# Patient Record
Sex: MALE | Race: WHITE | NOT HISPANIC OR LATINO | Employment: FULL TIME | URBAN - METROPOLITAN AREA
[De-identification: names, ages, dates, MRNs, and addresses within clinical notes are randomized per-mention and may not be internally consistent; named-entity substitution may affect disease eponyms.]

---

## 2021-02-02 ENCOUNTER — OFFICE VISIT (OUTPATIENT)
Dept: FAMILY MEDICINE | Facility: CLINIC | Age: 46
End: 2021-02-02
Payer: COMMERCIAL

## 2021-02-02 VITALS
SYSTOLIC BLOOD PRESSURE: 138 MMHG | TEMPERATURE: 98 F | BODY MASS INDEX: 52.48 KG/M2 | OXYGEN SATURATION: 96 % | HEIGHT: 65 IN | WEIGHT: 315 LBS | DIASTOLIC BLOOD PRESSURE: 77 MMHG | HEART RATE: 94 BPM

## 2021-02-02 DIAGNOSIS — Z11.4 ENCOUNTER FOR SCREENING FOR HIV: ICD-10-CM

## 2021-02-02 DIAGNOSIS — Z98.890 H/O CARDIAC RADIOFREQUENCY ABLATION: ICD-10-CM

## 2021-02-02 DIAGNOSIS — Z86.79 HISTORY OF SUPRAVENTRICULAR TACHYCARDIA: ICD-10-CM

## 2021-02-02 DIAGNOSIS — E66.01 MORBID OBESITY: ICD-10-CM

## 2021-02-02 DIAGNOSIS — K52.9 GASTROENTERITIS: ICD-10-CM

## 2021-02-02 DIAGNOSIS — R73.03 PREDIABETES: ICD-10-CM

## 2021-02-02 DIAGNOSIS — Z11.59 ENCOUNTER FOR HEPATITIS C SCREENING TEST FOR LOW RISK PATIENT: ICD-10-CM

## 2021-02-02 DIAGNOSIS — Z12.5 SCREENING PSA (PROSTATE SPECIFIC ANTIGEN): ICD-10-CM

## 2021-02-02 DIAGNOSIS — Z72.0 TOBACCO USE: ICD-10-CM

## 2021-02-02 DIAGNOSIS — Z00.00 PHYSICAL EXAM: ICD-10-CM

## 2021-02-02 DIAGNOSIS — G47.00 INSOMNIA, UNSPECIFIED TYPE: Primary | ICD-10-CM

## 2021-02-02 PROCEDURE — 99386 PR PREVENTIVE VISIT,NEW,40-64: ICD-10-PCS | Mod: S$GLB,,, | Performed by: FAMILY MEDICINE

## 2021-02-02 PROCEDURE — 99386 PREV VISIT NEW AGE 40-64: CPT | Mod: S$GLB,,, | Performed by: FAMILY MEDICINE

## 2021-02-02 PROCEDURE — 3008F BODY MASS INDEX DOCD: CPT | Mod: CPTII,S$GLB,, | Performed by: FAMILY MEDICINE

## 2021-02-02 PROCEDURE — 1126F AMNT PAIN NOTED NONE PRSNT: CPT | Mod: S$GLB,,, | Performed by: FAMILY MEDICINE

## 2021-02-02 PROCEDURE — 1126F PR PAIN SEVERITY QUANTIFIED, NO PAIN PRESENT: ICD-10-PCS | Mod: S$GLB,,, | Performed by: FAMILY MEDICINE

## 2021-02-02 PROCEDURE — 3008F PR BODY MASS INDEX (BMI) DOCUMENTED: ICD-10-PCS | Mod: CPTII,S$GLB,, | Performed by: FAMILY MEDICINE

## 2021-02-02 PROCEDURE — 99213 PR OFFICE/OUTPT VISIT, EST, LEVL III, 20-29 MIN: ICD-10-PCS | Mod: S$GLB,,, | Performed by: FAMILY MEDICINE

## 2021-02-02 PROCEDURE — 99213 OFFICE O/P EST LOW 20 MIN: CPT | Mod: S$GLB,,, | Performed by: FAMILY MEDICINE

## 2021-02-02 RX ORDER — METOPROLOL SUCCINATE 100 MG/1
50 TABLET, EXTENDED RELEASE ORAL
COMMUNITY
Start: 2020-09-02 | End: 2021-08-11

## 2021-02-02 RX ORDER — ONDANSETRON 4 MG/1
4 TABLET, FILM COATED ORAL EVERY 6 HOURS PRN
Qty: 10 TABLET | Refills: 0 | Status: SHIPPED | OUTPATIENT
Start: 2021-02-02 | End: 2021-08-11

## 2021-02-02 RX ORDER — ZOLPIDEM TARTRATE 10 MG/1
TABLET ORAL
COMMUNITY
Start: 2021-01-27 | End: 2021-02-02 | Stop reason: SDUPTHER

## 2021-02-02 RX ORDER — ZOLPIDEM TARTRATE 5 MG/1
5 TABLET ORAL NIGHTLY PRN
Qty: 30 TABLET | Refills: 5 | Status: SHIPPED | OUTPATIENT
Start: 2021-02-02 | End: 2021-08-11

## 2021-02-02 RX ORDER — SIMVASTATIN 20 MG/1
20 TABLET, FILM COATED ORAL NIGHTLY
COMMUNITY
Start: 2021-01-27 | End: 2021-08-11 | Stop reason: SDUPTHER

## 2021-02-03 PROBLEM — G47.00 INSOMNIA: Status: ACTIVE | Noted: 2021-02-03

## 2021-02-03 PROBLEM — Z72.0 TOBACCO USE: Status: ACTIVE | Noted: 2021-02-03

## 2021-02-03 PROBLEM — E66.01 MORBID OBESITY: Status: ACTIVE | Noted: 2021-02-03

## 2021-02-03 PROBLEM — Z98.890 H/O CARDIAC RADIOFREQUENCY ABLATION: Status: ACTIVE | Noted: 2021-02-03

## 2021-02-03 PROBLEM — R73.03 PREDIABETES: Status: ACTIVE | Noted: 2021-02-03

## 2021-02-03 PROBLEM — Z86.79 HISTORY OF SUPRAVENTRICULAR TACHYCARDIA: Status: ACTIVE | Noted: 2021-02-03

## 2021-03-10 ENCOUNTER — LAB VISIT (OUTPATIENT)
Dept: LAB | Facility: HOSPITAL | Age: 46
End: 2021-03-10
Attending: FAMILY MEDICINE
Payer: COMMERCIAL

## 2021-03-10 DIAGNOSIS — G47.00 INSOMNIA, UNSPECIFIED TYPE: ICD-10-CM

## 2021-03-10 DIAGNOSIS — Z11.4 ENCOUNTER FOR SCREENING FOR HIV: ICD-10-CM

## 2021-03-10 DIAGNOSIS — Z72.0 TOBACCO USE: ICD-10-CM

## 2021-03-10 DIAGNOSIS — Z12.5 SCREENING PSA (PROSTATE SPECIFIC ANTIGEN): ICD-10-CM

## 2021-03-10 DIAGNOSIS — Z00.00 PHYSICAL EXAM: ICD-10-CM

## 2021-03-10 DIAGNOSIS — Z11.59 ENCOUNTER FOR HEPATITIS C SCREENING TEST FOR LOW RISK PATIENT: ICD-10-CM

## 2021-03-10 LAB
ALBUMIN SERPL BCP-MCNC: 3.7 G/DL (ref 3.5–5.2)
ALP SERPL-CCNC: 71 U/L (ref 55–135)
ALT SERPL W/O P-5'-P-CCNC: 23 U/L (ref 10–44)
ANION GAP SERPL CALC-SCNC: 10 MMOL/L (ref 8–16)
AST SERPL-CCNC: 13 U/L (ref 10–40)
BASOPHILS # BLD AUTO: 0.07 K/UL (ref 0–0.2)
BASOPHILS NFR BLD: 0.6 % (ref 0–1.9)
BILIRUB SERPL-MCNC: 0.5 MG/DL (ref 0.1–1)
BUN SERPL-MCNC: 19 MG/DL (ref 6–20)
CALCIUM SERPL-MCNC: 9.3 MG/DL (ref 8.7–10.5)
CHLORIDE SERPL-SCNC: 101 MMOL/L (ref 95–110)
CHOLEST SERPL-MCNC: 130 MG/DL (ref 120–199)
CHOLEST/HDLC SERPL: 3.8 {RATIO} (ref 2–5)
CO2 SERPL-SCNC: 28 MMOL/L (ref 23–29)
COMPLEXED PSA SERPL-MCNC: 0.24 NG/ML (ref 0–4)
CREAT SERPL-MCNC: 0.8 MG/DL (ref 0.5–1.4)
DIFFERENTIAL METHOD: ABNORMAL
EOSINOPHIL # BLD AUTO: 0.5 K/UL (ref 0–0.5)
EOSINOPHIL NFR BLD: 4 % (ref 0–8)
ERYTHROCYTE [DISTWIDTH] IN BLOOD BY AUTOMATED COUNT: 14.1 % (ref 11.5–14.5)
EST. GFR  (AFRICAN AMERICAN): >60 ML/MIN/1.73 M^2
EST. GFR  (NON AFRICAN AMERICAN): >60 ML/MIN/1.73 M^2
ESTIMATED AVG GLUCOSE: 126 MG/DL (ref 68–131)
GLUCOSE SERPL-MCNC: 144 MG/DL (ref 70–110)
HBA1C MFR BLD: 6 % (ref 4.5–6.2)
HCT VFR BLD AUTO: 43.5 % (ref 40–54)
HDLC SERPL-MCNC: 34 MG/DL (ref 40–75)
HDLC SERPL: 26.2 % (ref 20–50)
HGB BLD-MCNC: 14.1 G/DL (ref 14–18)
IMM GRANULOCYTES # BLD AUTO: 0.04 K/UL (ref 0–0.04)
IMM GRANULOCYTES NFR BLD AUTO: 0.3 % (ref 0–0.5)
LDLC SERPL CALC-MCNC: 76.6 MG/DL (ref 63–159)
LYMPHOCYTES # BLD AUTO: 3.7 K/UL (ref 1–4.8)
LYMPHOCYTES NFR BLD: 30.4 % (ref 18–48)
MCH RBC QN AUTO: 32.6 PG (ref 27–31)
MCHC RBC AUTO-ENTMCNC: 32.4 G/DL (ref 32–36)
MCV RBC AUTO: 101 FL (ref 82–98)
MONOCYTES # BLD AUTO: 0.6 K/UL (ref 0.3–1)
MONOCYTES NFR BLD: 5 % (ref 4–15)
NEUTROPHILS # BLD AUTO: 7.2 K/UL (ref 1.8–7.7)
NEUTROPHILS NFR BLD: 59.7 % (ref 38–73)
NONHDLC SERPL-MCNC: 96 MG/DL
NRBC BLD-RTO: 0 /100 WBC
PLATELET # BLD AUTO: 274 K/UL (ref 150–350)
PMV BLD AUTO: 10.9 FL (ref 9.2–12.9)
POTASSIUM SERPL-SCNC: 4.8 MMOL/L (ref 3.5–5.1)
PROT SERPL-MCNC: 8 G/DL (ref 6–8.4)
RBC # BLD AUTO: 4.32 M/UL (ref 4.6–6.2)
SODIUM SERPL-SCNC: 139 MMOL/L (ref 136–145)
TRIGL SERPL-MCNC: 97 MG/DL (ref 30–150)
WBC # BLD AUTO: 12.1 K/UL (ref 3.9–12.7)

## 2021-03-10 PROCEDURE — 83036 HEMOGLOBIN GLYCOSYLATED A1C: CPT | Performed by: FAMILY MEDICINE

## 2021-03-10 PROCEDURE — 87389 HIV-1 AG W/HIV-1&-2 AB AG IA: CPT | Performed by: FAMILY MEDICINE

## 2021-03-10 PROCEDURE — 85025 COMPLETE CBC W/AUTO DIFF WBC: CPT | Performed by: FAMILY MEDICINE

## 2021-03-10 PROCEDURE — 36415 COLL VENOUS BLD VENIPUNCTURE: CPT | Performed by: FAMILY MEDICINE

## 2021-03-10 PROCEDURE — 86803 HEPATITIS C AB TEST: CPT | Performed by: FAMILY MEDICINE

## 2021-03-10 PROCEDURE — 80053 COMPREHEN METABOLIC PANEL: CPT | Performed by: FAMILY MEDICINE

## 2021-03-10 PROCEDURE — 84153 ASSAY OF PSA TOTAL: CPT | Performed by: FAMILY MEDICINE

## 2021-03-10 PROCEDURE — 80061 LIPID PANEL: CPT | Performed by: FAMILY MEDICINE

## 2021-03-11 LAB
HCV AB S/CO SERPL IA: <0.1 S/CO RATIO (ref 0–0.9)
HIV 1+2 AB+HIV1 P24 AG SERPL QL IA: NON REACTIVE

## 2021-08-11 ENCOUNTER — OFFICE VISIT (OUTPATIENT)
Dept: FAMILY MEDICINE | Facility: CLINIC | Age: 46
End: 2021-08-11
Payer: COMMERCIAL

## 2021-08-11 VITALS
BODY MASS INDEX: 52.48 KG/M2 | WEIGHT: 315 LBS | HEART RATE: 91 BPM | TEMPERATURE: 98 F | SYSTOLIC BLOOD PRESSURE: 128 MMHG | OXYGEN SATURATION: 95 % | HEIGHT: 65 IN | DIASTOLIC BLOOD PRESSURE: 68 MMHG

## 2021-08-11 DIAGNOSIS — G47.00 INSOMNIA, UNSPECIFIED TYPE: ICD-10-CM

## 2021-08-11 DIAGNOSIS — Z13.220 SCREENING CHOLESTEROL LEVEL: ICD-10-CM

## 2021-08-11 DIAGNOSIS — Z86.79 HISTORY OF SUPRAVENTRICULAR TACHYCARDIA: ICD-10-CM

## 2021-08-11 DIAGNOSIS — E66.01 MORBID OBESITY: ICD-10-CM

## 2021-08-11 DIAGNOSIS — R73.03 PREDIABETES: Primary | ICD-10-CM

## 2021-08-11 DIAGNOSIS — Z72.0 TOBACCO USE: ICD-10-CM

## 2021-08-11 PROCEDURE — 99214 PR OFFICE/OUTPT VISIT, EST, LEVL IV, 30-39 MIN: ICD-10-PCS | Mod: S$GLB,,, | Performed by: FAMILY MEDICINE

## 2021-08-11 PROCEDURE — 99214 OFFICE O/P EST MOD 30 MIN: CPT | Mod: S$GLB,,, | Performed by: FAMILY MEDICINE

## 2021-08-11 PROCEDURE — 3044F HG A1C LEVEL LT 7.0%: CPT | Mod: CPTII,S$GLB,, | Performed by: FAMILY MEDICINE

## 2021-08-11 PROCEDURE — 1126F AMNT PAIN NOTED NONE PRSNT: CPT | Mod: CPTII,S$GLB,, | Performed by: FAMILY MEDICINE

## 2021-08-11 PROCEDURE — 3078F PR MOST RECENT DIASTOLIC BLOOD PRESSURE < 80 MM HG: ICD-10-PCS | Mod: CPTII,S$GLB,, | Performed by: FAMILY MEDICINE

## 2021-08-11 PROCEDURE — 1159F MED LIST DOCD IN RCRD: CPT | Mod: CPTII,S$GLB,, | Performed by: FAMILY MEDICINE

## 2021-08-11 PROCEDURE — 3008F PR BODY MASS INDEX (BMI) DOCUMENTED: ICD-10-PCS | Mod: CPTII,S$GLB,, | Performed by: FAMILY MEDICINE

## 2021-08-11 PROCEDURE — 3044F PR MOST RECENT HEMOGLOBIN A1C LEVEL <7.0%: ICD-10-PCS | Mod: CPTII,S$GLB,, | Performed by: FAMILY MEDICINE

## 2021-08-11 PROCEDURE — 3008F BODY MASS INDEX DOCD: CPT | Mod: CPTII,S$GLB,, | Performed by: FAMILY MEDICINE

## 2021-08-11 PROCEDURE — 1126F PR PAIN SEVERITY QUANTIFIED, NO PAIN PRESENT: ICD-10-PCS | Mod: CPTII,S$GLB,, | Performed by: FAMILY MEDICINE

## 2021-08-11 PROCEDURE — 3074F SYST BP LT 130 MM HG: CPT | Mod: CPTII,S$GLB,, | Performed by: FAMILY MEDICINE

## 2021-08-11 PROCEDURE — 3078F DIAST BP <80 MM HG: CPT | Mod: CPTII,S$GLB,, | Performed by: FAMILY MEDICINE

## 2021-08-11 PROCEDURE — 1159F PR MEDICATION LIST DOCUMENTED IN MEDICAL RECORD: ICD-10-PCS | Mod: CPTII,S$GLB,, | Performed by: FAMILY MEDICINE

## 2021-08-11 PROCEDURE — 3074F PR MOST RECENT SYSTOLIC BLOOD PRESSURE < 130 MM HG: ICD-10-PCS | Mod: CPTII,S$GLB,, | Performed by: FAMILY MEDICINE

## 2021-08-11 RX ORDER — METOPROLOL SUCCINATE 50 MG/1
50 TABLET, EXTENDED RELEASE ORAL DAILY
Qty: 90 TABLET | Refills: 1 | Status: SHIPPED | OUTPATIENT
Start: 2021-08-11 | End: 2022-01-26 | Stop reason: SDUPTHER

## 2021-08-11 RX ORDER — ALBUTEROL SULFATE 90 UG/1
AEROSOL, METERED RESPIRATORY (INHALATION)
COMMUNITY
Start: 2021-07-06 | End: 2022-03-23

## 2021-08-11 RX ORDER — ZOLPIDEM TARTRATE 10 MG/1
10 TABLET ORAL NIGHTLY
Qty: 30 TABLET | Refills: 5 | Status: SHIPPED | OUTPATIENT
Start: 2021-08-11 | End: 2022-01-26 | Stop reason: SDUPTHER

## 2021-08-11 RX ORDER — ZOLPIDEM TARTRATE 10 MG/1
10 TABLET ORAL
COMMUNITY
End: 2021-08-11 | Stop reason: SDUPTHER

## 2021-08-11 RX ORDER — SIMVASTATIN 20 MG/1
20 TABLET, FILM COATED ORAL NIGHTLY
Qty: 90 TABLET | Refills: 1 | Status: SHIPPED | OUTPATIENT
Start: 2021-08-11 | End: 2022-01-26 | Stop reason: SDUPTHER

## 2021-08-11 RX ORDER — METOPROLOL SUCCINATE 50 MG/1
50 TABLET, EXTENDED RELEASE ORAL
COMMUNITY
End: 2021-08-11 | Stop reason: SDUPTHER

## 2021-11-10 ENCOUNTER — LAB VISIT (OUTPATIENT)
Dept: LAB | Facility: HOSPITAL | Age: 46
End: 2021-11-10
Attending: FAMILY MEDICINE
Payer: COMMERCIAL

## 2021-11-10 DIAGNOSIS — R73.03 PREDIABETES: ICD-10-CM

## 2021-11-10 DIAGNOSIS — E66.01 MORBID OBESITY: ICD-10-CM

## 2021-11-10 DIAGNOSIS — Z13.220 SCREENING CHOLESTEROL LEVEL: ICD-10-CM

## 2021-11-10 LAB
ALBUMIN SERPL BCP-MCNC: 4.1 G/DL (ref 3.5–5.2)
ALP SERPL-CCNC: 66 U/L (ref 55–135)
ALT SERPL W/O P-5'-P-CCNC: 18 U/L (ref 10–44)
ANION GAP SERPL CALC-SCNC: 7 MMOL/L (ref 8–16)
AST SERPL-CCNC: 18 U/L (ref 10–40)
BILIRUB SERPL-MCNC: 0.6 MG/DL (ref 0.1–1)
BUN SERPL-MCNC: 20 MG/DL (ref 6–20)
CALCIUM SERPL-MCNC: 9.6 MG/DL (ref 8.7–10.5)
CHLORIDE SERPL-SCNC: 102 MMOL/L (ref 95–110)
CHOLEST SERPL-MCNC: 144 MG/DL (ref 120–199)
CHOLEST/HDLC SERPL: 4.2 {RATIO} (ref 2–5)
CO2 SERPL-SCNC: 29 MMOL/L (ref 23–29)
CREAT SERPL-MCNC: 1 MG/DL (ref 0.5–1.4)
EST. GFR  (AFRICAN AMERICAN): >60 ML/MIN/1.73 M^2
EST. GFR  (NON AFRICAN AMERICAN): >60 ML/MIN/1.73 M^2
ESTIMATED AVG GLUCOSE: 140 MG/DL (ref 68–131)
GLUCOSE SERPL-MCNC: 155 MG/DL (ref 70–110)
HBA1C MFR BLD: 6.5 % (ref 4.5–6.2)
HDLC SERPL-MCNC: 34 MG/DL (ref 40–75)
HDLC SERPL: 23.6 % (ref 20–50)
LDLC SERPL CALC-MCNC: 91.2 MG/DL (ref 63–159)
NONHDLC SERPL-MCNC: 110 MG/DL
POTASSIUM SERPL-SCNC: 4.8 MMOL/L (ref 3.5–5.1)
PROT SERPL-MCNC: 8.4 G/DL (ref 6–8.4)
SODIUM SERPL-SCNC: 138 MMOL/L (ref 136–145)
TRIGL SERPL-MCNC: 94 MG/DL (ref 30–150)

## 2021-11-10 PROCEDURE — 36415 COLL VENOUS BLD VENIPUNCTURE: CPT | Performed by: FAMILY MEDICINE

## 2021-11-10 PROCEDURE — 80053 COMPREHEN METABOLIC PANEL: CPT | Performed by: FAMILY MEDICINE

## 2021-11-10 PROCEDURE — 80061 LIPID PANEL: CPT | Performed by: FAMILY MEDICINE

## 2021-11-10 PROCEDURE — 83036 HEMOGLOBIN GLYCOSYLATED A1C: CPT | Performed by: FAMILY MEDICINE

## 2022-01-26 ENCOUNTER — OFFICE VISIT (OUTPATIENT)
Dept: FAMILY MEDICINE | Facility: CLINIC | Age: 47
End: 2022-01-26
Payer: COMMERCIAL

## 2022-01-26 VITALS
DIASTOLIC BLOOD PRESSURE: 82 MMHG | OXYGEN SATURATION: 99 % | SYSTOLIC BLOOD PRESSURE: 124 MMHG | BODY MASS INDEX: 52.48 KG/M2 | TEMPERATURE: 98 F | HEART RATE: 76 BPM | WEIGHT: 315 LBS | HEIGHT: 65 IN

## 2022-01-26 DIAGNOSIS — G47.00 INSOMNIA, UNSPECIFIED TYPE: ICD-10-CM

## 2022-01-26 DIAGNOSIS — J06.9 UPPER RESPIRATORY TRACT INFECTION, UNSPECIFIED TYPE: ICD-10-CM

## 2022-01-26 DIAGNOSIS — E78.5 HYPERLIPIDEMIA, UNSPECIFIED HYPERLIPIDEMIA TYPE: Primary | ICD-10-CM

## 2022-01-26 DIAGNOSIS — E66.01 MORBID OBESITY: ICD-10-CM

## 2022-01-26 DIAGNOSIS — Z86.79 HISTORY OF SUPRAVENTRICULAR TACHYCARDIA: ICD-10-CM

## 2022-01-26 DIAGNOSIS — E11.9 TYPE 2 DIABETES MELLITUS WITHOUT COMPLICATION, WITHOUT LONG-TERM CURRENT USE OF INSULIN: ICD-10-CM

## 2022-01-26 DIAGNOSIS — Z12.11 COLON CANCER SCREENING: ICD-10-CM

## 2022-01-26 PROCEDURE — 3079F PR MOST RECENT DIASTOLIC BLOOD PRESSURE 80-89 MM HG: ICD-10-PCS | Mod: CPTII,S$GLB,, | Performed by: FAMILY MEDICINE

## 2022-01-26 PROCEDURE — 1159F MED LIST DOCD IN RCRD: CPT | Mod: CPTII,S$GLB,, | Performed by: FAMILY MEDICINE

## 2022-01-26 PROCEDURE — 99214 PR OFFICE/OUTPT VISIT, EST, LEVL IV, 30-39 MIN: ICD-10-PCS | Mod: S$GLB,,, | Performed by: FAMILY MEDICINE

## 2022-01-26 PROCEDURE — 3079F DIAST BP 80-89 MM HG: CPT | Mod: CPTII,S$GLB,, | Performed by: FAMILY MEDICINE

## 2022-01-26 PROCEDURE — 3074F SYST BP LT 130 MM HG: CPT | Mod: CPTII,S$GLB,, | Performed by: FAMILY MEDICINE

## 2022-01-26 PROCEDURE — 99214 OFFICE O/P EST MOD 30 MIN: CPT | Mod: S$GLB,,, | Performed by: FAMILY MEDICINE

## 2022-01-26 PROCEDURE — 3008F BODY MASS INDEX DOCD: CPT | Mod: CPTII,S$GLB,, | Performed by: FAMILY MEDICINE

## 2022-01-26 PROCEDURE — 1159F PR MEDICATION LIST DOCUMENTED IN MEDICAL RECORD: ICD-10-PCS | Mod: CPTII,S$GLB,, | Performed by: FAMILY MEDICINE

## 2022-01-26 PROCEDURE — 3074F PR MOST RECENT SYSTOLIC BLOOD PRESSURE < 130 MM HG: ICD-10-PCS | Mod: CPTII,S$GLB,, | Performed by: FAMILY MEDICINE

## 2022-01-26 PROCEDURE — 3008F PR BODY MASS INDEX (BMI) DOCUMENTED: ICD-10-PCS | Mod: CPTII,S$GLB,, | Performed by: FAMILY MEDICINE

## 2022-01-26 RX ORDER — ZOLPIDEM TARTRATE 10 MG/1
10 TABLET ORAL NIGHTLY
Qty: 30 TABLET | Refills: 5 | Status: SHIPPED | OUTPATIENT
Start: 2022-01-26 | End: 2022-06-22

## 2022-01-26 RX ORDER — SIMVASTATIN 20 MG/1
20 TABLET, FILM COATED ORAL NIGHTLY
Qty: 90 TABLET | Refills: 1 | Status: SHIPPED | OUTPATIENT
Start: 2022-01-26 | End: 2022-06-22 | Stop reason: SDUPTHER

## 2022-01-26 RX ORDER — DOXYCYCLINE 100 MG/1
100 CAPSULE ORAL EVERY 12 HOURS
Qty: 20 CAPSULE | Refills: 0 | Status: SHIPPED | OUTPATIENT
Start: 2022-01-26 | End: 2022-03-23

## 2022-01-26 RX ORDER — FLUTICASONE PROPIONATE 50 MCG
1 SPRAY, SUSPENSION (ML) NASAL 2 TIMES DAILY
Qty: 16 G | Refills: 0 | Status: SHIPPED | OUTPATIENT
Start: 2022-01-26 | End: 2022-03-23

## 2022-01-26 RX ORDER — METOPROLOL SUCCINATE 50 MG/1
50 TABLET, EXTENDED RELEASE ORAL DAILY
Qty: 90 TABLET | Refills: 1 | Status: SHIPPED | OUTPATIENT
Start: 2022-01-26 | End: 2022-06-22 | Stop reason: SDUPTHER

## 2022-01-26 RX ORDER — METFORMIN HYDROCHLORIDE 500 MG/1
500 TABLET, EXTENDED RELEASE ORAL 2 TIMES DAILY WITH MEALS
Qty: 180 TABLET | Refills: 1 | Status: SHIPPED | OUTPATIENT
Start: 2022-01-26 | End: 2022-06-22 | Stop reason: SDUPTHER

## 2022-01-26 RX ORDER — BENZONATATE 100 MG/1
100 CAPSULE ORAL 4 TIMES DAILY PRN
Qty: 20 CAPSULE | Refills: 0 | Status: SHIPPED | OUTPATIENT
Start: 2022-01-26 | End: 2022-02-05

## 2022-01-26 NOTE — PROGRESS NOTES
Follow-up hyperlipidemia.  Cholesterol 144 HDL 34 LDL 91. Insomnia needs refill of Ambien uses it nightly.   check done.  History of SVT doing well.  No palpitations.  Diabetes mellitus type 2 A1c is 6.5 fasting sugar 155. Prior A1c was 6.  Has had 2 doses of COVID vaccine and had flu shot.  Morbid obesity BMI of 55. Weight is up 18 lb.  Colon screening discussed.  No family history of colon cancer he has not had polyps.  Opts for a Cologuard test.  Upper respiratory infection sinus congestion for week slight cough.  COVID testing was negative.    Physical examination vital signs noted.  Overweight male no acute distress.  Neck without bruit.  Chest clear.  Heart regular rate rhythm.  Abdomen bowel sounds positive soft nontender.  Extremities without edema positive pulses.    Impression.  Hyperlipidemia.  Diabetes mellitus type 2. Chronic insomnia.  History of supraventricular tachycardia.  Morbid obesity BMI of 55. Colon cancer screening.  Upper respiratory infection resolved.    Plan metoprolol XL 50 refilled.  Zocor 20 refill.  Ambien 10 mg HS refilled 30 of these with 5 refills.  Cologuard ordered.  Microalbumin ordered.  Diabetic eye exam discussed and recommended.  Follow-up in 1 month.  If he is not doing better at that point will give him a G LP 1. Diabetic Education ordered.  Metformin  b.i.d. 180 with a refill.  Vibramycin 100 mg b.i.d. for 10 days.  Tessalon 100 mg q.i.d. p.r.n. cough.  Flonase b.i.d. each nostril 1 inhaler.

## 2022-01-28 LAB
ALBUMIN/CREAT UR: 8 MCG/MG CREAT
CREAT UR-MCNC: 145 MG/DL (ref 20–320)
MICROALBUMIN UR-MCNC: 1.2 MG/DL

## 2022-01-29 PROBLEM — E78.5 HYPERLIPIDEMIA: Status: ACTIVE | Noted: 2022-01-29

## 2022-01-29 PROBLEM — E11.9 TYPE 2 DIABETES MELLITUS WITHOUT COMPLICATION, WITHOUT LONG-TERM CURRENT USE OF INSULIN: Status: ACTIVE | Noted: 2022-01-29

## 2022-02-11 LAB — NONINV COLON CA DNA+OCC BLD SCRN STL QL: POSITIVE

## 2022-02-23 ENCOUNTER — OFFICE VISIT (OUTPATIENT)
Dept: FAMILY MEDICINE | Facility: CLINIC | Age: 47
End: 2022-02-23
Payer: COMMERCIAL

## 2022-02-23 ENCOUNTER — PATIENT MESSAGE (OUTPATIENT)
Dept: FAMILY MEDICINE | Facility: CLINIC | Age: 47
End: 2022-02-23
Payer: COMMERCIAL

## 2022-02-23 VITALS
WEIGHT: 315 LBS | BODY MASS INDEX: 52.48 KG/M2 | HEIGHT: 65 IN | HEART RATE: 64 BPM | TEMPERATURE: 98 F | DIASTOLIC BLOOD PRESSURE: 86 MMHG | SYSTOLIC BLOOD PRESSURE: 128 MMHG | OXYGEN SATURATION: 97 %

## 2022-02-23 DIAGNOSIS — Z12.11 COLON CANCER SCREENING: ICD-10-CM

## 2022-02-23 DIAGNOSIS — E78.5 HYPERLIPIDEMIA, UNSPECIFIED HYPERLIPIDEMIA TYPE: Primary | ICD-10-CM

## 2022-02-23 DIAGNOSIS — E66.01 MORBID OBESITY: ICD-10-CM

## 2022-02-23 DIAGNOSIS — E11.9 TYPE 2 DIABETES MELLITUS WITHOUT COMPLICATION, WITHOUT LONG-TERM CURRENT USE OF INSULIN: ICD-10-CM

## 2022-02-23 PROCEDURE — 99214 OFFICE O/P EST MOD 30 MIN: CPT | Mod: S$GLB,,, | Performed by: FAMILY MEDICINE

## 2022-02-23 PROCEDURE — 3061F NEG MICROALBUMINURIA REV: CPT | Mod: CPTII,S$GLB,, | Performed by: FAMILY MEDICINE

## 2022-02-23 PROCEDURE — 3008F PR BODY MASS INDEX (BMI) DOCUMENTED: ICD-10-PCS | Mod: CPTII,S$GLB,, | Performed by: FAMILY MEDICINE

## 2022-02-23 PROCEDURE — 3008F BODY MASS INDEX DOCD: CPT | Mod: CPTII,S$GLB,, | Performed by: FAMILY MEDICINE

## 2022-02-23 PROCEDURE — 99214 PR OFFICE/OUTPT VISIT, EST, LEVL IV, 30-39 MIN: ICD-10-PCS | Mod: S$GLB,,, | Performed by: FAMILY MEDICINE

## 2022-02-23 PROCEDURE — 3079F DIAST BP 80-89 MM HG: CPT | Mod: CPTII,S$GLB,, | Performed by: FAMILY MEDICINE

## 2022-02-23 PROCEDURE — 3061F PR NEG MICROALBUMINURIA RESULT DOCUMENTED/REVIEW: ICD-10-PCS | Mod: CPTII,S$GLB,, | Performed by: FAMILY MEDICINE

## 2022-02-23 PROCEDURE — 3074F PR MOST RECENT SYSTOLIC BLOOD PRESSURE < 130 MM HG: ICD-10-PCS | Mod: CPTII,S$GLB,, | Performed by: FAMILY MEDICINE

## 2022-02-23 PROCEDURE — 3079F PR MOST RECENT DIASTOLIC BLOOD PRESSURE 80-89 MM HG: ICD-10-PCS | Mod: CPTII,S$GLB,, | Performed by: FAMILY MEDICINE

## 2022-02-23 PROCEDURE — 3074F SYST BP LT 130 MM HG: CPT | Mod: CPTII,S$GLB,, | Performed by: FAMILY MEDICINE

## 2022-02-23 PROCEDURE — 3066F PR DOCUMENTATION OF TREATMENT FOR NEPHROPATHY: ICD-10-PCS | Mod: CPTII,S$GLB,, | Performed by: FAMILY MEDICINE

## 2022-02-23 PROCEDURE — 1159F MED LIST DOCD IN RCRD: CPT | Mod: CPTII,S$GLB,, | Performed by: FAMILY MEDICINE

## 2022-02-23 PROCEDURE — 3066F NEPHROPATHY DOC TX: CPT | Mod: CPTII,S$GLB,, | Performed by: FAMILY MEDICINE

## 2022-02-23 PROCEDURE — 1159F PR MEDICATION LIST DOCUMENTED IN MEDICAL RECORD: ICD-10-PCS | Mod: CPTII,S$GLB,, | Performed by: FAMILY MEDICINE

## 2022-02-23 RX ORDER — SEMAGLUTIDE 1.34 MG/ML
INJECTION, SOLUTION SUBCUTANEOUS
Qty: 1 PEN | Refills: 5 | Status: SHIPPED | OUTPATIENT
Start: 2022-02-23 | End: 2022-04-20 | Stop reason: DRUGHIGH

## 2022-02-24 ENCOUNTER — TELEPHONE (OUTPATIENT)
Dept: FAMILY MEDICINE | Facility: CLINIC | Age: 47
End: 2022-02-24
Payer: COMMERCIAL

## 2022-02-24 NOTE — PROGRESS NOTES
Follow-up of hyperlipidemia.  Diabetes mellitus type 2.  His meter is broken.  Was down around foreign 40 when he was checking it.  Taking metformin and tolerating it.  Weight has not changed any.  Did not hear from the diabetic educator yet.  No call.  He got a Cologuard it was positive.  So he needs colonoscopy.  No change in bowel movements or rectal bleeding.  Has microalbumin was negative.  Morbid obesity BMI 56.    Physical examination vital signs noted.  Neck without bruit.  Chest clear.  Heart regular rate rhythm.  Abdomen bowel sounds are positive soft nontender.    Impression.  Hyperlipidemia.  Diabetes mellitus type 2. Positive Cologuard.  Morbid obesity.    Plan colonoscopy.  Two Dr. Jean.  Add Ozempic 0.25 weekly x2 then 0.5 weekly.  Follow-up in 4 weeks on this.  Go for his diabetic eye exam.  Diabetic Education added again.  Continue the metformin.

## 2022-02-24 NOTE — TELEPHONE ENCOUNTER
----- Message from Diana Pathak sent at 2/24/2022 11:27 AM CST -----  Regarding: referral  Contact: Patient  Patient want to know if office can re-send referral for colonoscopy, any questions please call back at [phone] (home)

## 2022-03-02 ENCOUNTER — TELEPHONE (OUTPATIENT)
Dept: PHARMACY | Facility: CLINIC | Age: 47
End: 2022-03-02
Payer: COMMERCIAL

## 2022-03-02 NOTE — TELEPHONE ENCOUNTER
Good afternoon,     A prior authorization for Ozempic 0.25mg/0.5mg has been approved for Mr. Jacoby Jean-Baptiste. The prior authorization is approved until: 02/25/2023. The patient will be notified of the approval. Thank you!    Bailey Whiteside, PharmD  [facility]

## 2022-03-23 ENCOUNTER — OFFICE VISIT (OUTPATIENT)
Dept: FAMILY MEDICINE | Facility: CLINIC | Age: 47
End: 2022-03-23
Payer: COMMERCIAL

## 2022-03-23 VITALS
TEMPERATURE: 98 F | HEIGHT: 65 IN | WEIGHT: 315 LBS | RESPIRATION RATE: 18 BRPM | BODY MASS INDEX: 52.48 KG/M2 | HEART RATE: 84 BPM | SYSTOLIC BLOOD PRESSURE: 128 MMHG | DIASTOLIC BLOOD PRESSURE: 82 MMHG | OXYGEN SATURATION: 99 %

## 2022-03-23 DIAGNOSIS — E11.9 TYPE 2 DIABETES MELLITUS WITHOUT COMPLICATION, WITHOUT LONG-TERM CURRENT USE OF INSULIN: Primary | ICD-10-CM

## 2022-03-23 DIAGNOSIS — E66.01 MORBID OBESITY: ICD-10-CM

## 2022-03-23 DIAGNOSIS — E78.5 HYPERLIPIDEMIA, UNSPECIFIED HYPERLIPIDEMIA TYPE: ICD-10-CM

## 2022-03-23 DIAGNOSIS — Z72.0 TOBACCO USE: ICD-10-CM

## 2022-03-23 DIAGNOSIS — R19.5 POSITIVE COLORECTAL CANCER SCREENING USING COLOGUARD TEST: ICD-10-CM

## 2022-03-23 PROCEDURE — 3079F DIAST BP 80-89 MM HG: CPT | Mod: CPTII,S$GLB,, | Performed by: FAMILY MEDICINE

## 2022-03-23 PROCEDURE — 90715 TDAP VACCINE GREATER THAN OR EQUAL TO 7YO IM: ICD-10-PCS | Mod: S$GLB,,, | Performed by: FAMILY MEDICINE

## 2022-03-23 PROCEDURE — 3061F PR NEG MICROALBUMINURIA RESULT DOCUMENTED/REVIEW: ICD-10-PCS | Mod: CPTII,S$GLB,, | Performed by: FAMILY MEDICINE

## 2022-03-23 PROCEDURE — 1160F PR REVIEW ALL MEDS BY PRESCRIBER/CLIN PHARMACIST DOCUMENTED: ICD-10-PCS | Mod: CPTII,S$GLB,, | Performed by: FAMILY MEDICINE

## 2022-03-23 PROCEDURE — 1159F PR MEDICATION LIST DOCUMENTED IN MEDICAL RECORD: ICD-10-PCS | Mod: CPTII,S$GLB,, | Performed by: FAMILY MEDICINE

## 2022-03-23 PROCEDURE — 3066F NEPHROPATHY DOC TX: CPT | Mod: CPTII,S$GLB,, | Performed by: FAMILY MEDICINE

## 2022-03-23 PROCEDURE — 1159F MED LIST DOCD IN RCRD: CPT | Mod: CPTII,S$GLB,, | Performed by: FAMILY MEDICINE

## 2022-03-23 PROCEDURE — 1160F RVW MEDS BY RX/DR IN RCRD: CPT | Mod: CPTII,S$GLB,, | Performed by: FAMILY MEDICINE

## 2022-03-23 PROCEDURE — 3066F PR DOCUMENTATION OF TREATMENT FOR NEPHROPATHY: ICD-10-PCS | Mod: CPTII,S$GLB,, | Performed by: FAMILY MEDICINE

## 2022-03-23 PROCEDURE — 3061F NEG MICROALBUMINURIA REV: CPT | Mod: CPTII,S$GLB,, | Performed by: FAMILY MEDICINE

## 2022-03-23 PROCEDURE — 3008F PR BODY MASS INDEX (BMI) DOCUMENTED: ICD-10-PCS | Mod: CPTII,S$GLB,, | Performed by: FAMILY MEDICINE

## 2022-03-23 PROCEDURE — 3008F BODY MASS INDEX DOCD: CPT | Mod: CPTII,S$GLB,, | Performed by: FAMILY MEDICINE

## 2022-03-23 PROCEDURE — 3074F PR MOST RECENT SYSTOLIC BLOOD PRESSURE < 130 MM HG: ICD-10-PCS | Mod: CPTII,S$GLB,, | Performed by: FAMILY MEDICINE

## 2022-03-23 PROCEDURE — 3074F SYST BP LT 130 MM HG: CPT | Mod: CPTII,S$GLB,, | Performed by: FAMILY MEDICINE

## 2022-03-23 PROCEDURE — 90471 TDAP VACCINE GREATER THAN OR EQUAL TO 7YO IM: ICD-10-PCS | Mod: S$GLB,,, | Performed by: FAMILY MEDICINE

## 2022-03-23 PROCEDURE — 90471 IMMUNIZATION ADMIN: CPT | Mod: S$GLB,,, | Performed by: FAMILY MEDICINE

## 2022-03-23 PROCEDURE — 99214 OFFICE O/P EST MOD 30 MIN: CPT | Mod: 25,S$GLB,, | Performed by: FAMILY MEDICINE

## 2022-03-23 PROCEDURE — 99214 PR OFFICE/OUTPT VISIT, EST, LEVL IV, 30-39 MIN: ICD-10-PCS | Mod: 25,S$GLB,, | Performed by: FAMILY MEDICINE

## 2022-03-23 PROCEDURE — 3079F PR MOST RECENT DIASTOLIC BLOOD PRESSURE 80-89 MM HG: ICD-10-PCS | Mod: CPTII,S$GLB,, | Performed by: FAMILY MEDICINE

## 2022-03-23 PROCEDURE — 90715 TDAP VACCINE 7 YRS/> IM: CPT | Mod: S$GLB,,, | Performed by: FAMILY MEDICINE

## 2022-03-25 PROBLEM — R19.5 POSITIVE COLORECTAL CANCER SCREENING USING COLOGUARD TEST: Status: ACTIVE | Noted: 2022-03-25

## 2022-03-26 NOTE — PROGRESS NOTES
Subjective:       Patient ID: Jacoby Jean-Baptiste is a 47 y.o. male.    Chief Complaint: Follow-up (On ozempic)    One-month follow-up on Ozempic.  Using 0.5 weekly in doing well with it.  Weight down 5 lb.  Glucose is 104-130 over the past week.  Had a positive Cologuard.  Appointment today with Gastroenterology.  Eye exam is being done in a few weeks.  Due for diabetic foot exam today.    Physical examination vital signs are noted.  Neck without bruit.  Overweight male no acute distress.  Chest clear.  Heart regular rate rhythm.  Abdomen bowel sounds are positive soft and nontender.  Extremities are without edema 2+ pedal pulses.  Light touch sensation intact 5 of 5 spots in each foot tested.  Position sense and vibratory sensation are intact.  No calluses skin breakdown or ulcerations of the feet.    Review of Systems    Objective:      Physical Exam    Assessment:       1. Type 2 diabetes mellitus without complication, without long-term current use of insulin    2. Morbid obesity    3. BMI 50.0-59.9, adult    4. Hyperlipidemia, unspecified hyperlipidemia type    5. Tobacco use    6. Positive colorectal cancer screening using Cologuard test        Plan:       Type 2 diabetes mellitus without complication, without long-term current use of insulin    Morbid obesity    BMI 50.0-59.9, adult    Hyperlipidemia, unspecified hyperlipidemia type    Tobacco use    Positive colorectal cancer screening using Cologuard test    Other orders  -     (In Office Administered) Tdap Vaccine      Continue diet weight reduction.  Continue the Ozempic 0.5 weekly.  Eye exam and colon exams as planned.  Declines pneumococcal vaccine.  Discussed smoking cessation.  Follow-up in 1 month.  If he is tolerating the Ozempic and not losing significant amount of weight will increase to 1 mg at that time.

## 2022-04-12 ENCOUNTER — PATIENT OUTREACH (OUTPATIENT)
Dept: ADMINISTRATIVE | Facility: OTHER | Age: 47
End: 2022-04-12
Payer: COMMERCIAL

## 2022-04-18 ENCOUNTER — OFFICE VISIT (OUTPATIENT)
Dept: OPHTHALMOLOGY | Facility: CLINIC | Age: 47
End: 2022-04-18
Payer: COMMERCIAL

## 2022-04-18 DIAGNOSIS — D31.32 CHOROIDAL NEVUS, LEFT EYE: ICD-10-CM

## 2022-04-18 DIAGNOSIS — E11.9 TYPE 2 DIABETES MELLITUS WITHOUT RETINOPATHY: Primary | ICD-10-CM

## 2022-04-18 PROCEDURE — 1160F RVW MEDS BY RX/DR IN RCRD: CPT | Mod: CPTII,S$GLB,, | Performed by: OPHTHALMOLOGY

## 2022-04-18 PROCEDURE — 92004 COMPRE OPH EXAM NEW PT 1/>: CPT | Mod: S$GLB,,, | Performed by: OPHTHALMOLOGY

## 2022-04-18 PROCEDURE — 1159F MED LIST DOCD IN RCRD: CPT | Mod: CPTII,S$GLB,, | Performed by: OPHTHALMOLOGY

## 2022-04-18 PROCEDURE — 1159F PR MEDICATION LIST DOCUMENTED IN MEDICAL RECORD: ICD-10-PCS | Mod: CPTII,S$GLB,, | Performed by: OPHTHALMOLOGY

## 2022-04-18 PROCEDURE — 99999 PR PBB SHADOW E&M-EST. PATIENT-LVL III: CPT | Mod: PBBFAC,,, | Performed by: OPHTHALMOLOGY

## 2022-04-18 PROCEDURE — 92004 PR EYE EXAM, NEW PATIENT,COMPREHESV: ICD-10-PCS | Mod: S$GLB,,, | Performed by: OPHTHALMOLOGY

## 2022-04-18 PROCEDURE — 3066F PR DOCUMENTATION OF TREATMENT FOR NEPHROPATHY: ICD-10-PCS | Mod: CPTII,S$GLB,, | Performed by: OPHTHALMOLOGY

## 2022-04-18 PROCEDURE — 3061F PR NEG MICROALBUMINURIA RESULT DOCUMENTED/REVIEW: ICD-10-PCS | Mod: CPTII,S$GLB,, | Performed by: OPHTHALMOLOGY

## 2022-04-18 PROCEDURE — 3066F NEPHROPATHY DOC TX: CPT | Mod: CPTII,S$GLB,, | Performed by: OPHTHALMOLOGY

## 2022-04-18 PROCEDURE — 2023F DILAT RTA XM W/O RTNOPTHY: CPT | Mod: CPTII,S$GLB,, | Performed by: OPHTHALMOLOGY

## 2022-04-18 PROCEDURE — 2023F PR DILATED RETINAL EXAM W/O EVID OF RETINOPATHY: ICD-10-PCS | Mod: CPTII,S$GLB,, | Performed by: OPHTHALMOLOGY

## 2022-04-18 PROCEDURE — 99999 PR PBB SHADOW E&M-EST. PATIENT-LVL III: ICD-10-PCS | Mod: PBBFAC,,, | Performed by: OPHTHALMOLOGY

## 2022-04-18 PROCEDURE — 3061F NEG MICROALBUMINURIA REV: CPT | Mod: CPTII,S$GLB,, | Performed by: OPHTHALMOLOGY

## 2022-04-18 PROCEDURE — 1160F PR REVIEW ALL MEDS BY PRESCRIBER/CLIN PHARMACIST DOCUMENTED: ICD-10-PCS | Mod: CPTII,S$GLB,, | Performed by: OPHTHALMOLOGY

## 2022-04-18 NOTE — PROGRESS NOTES
HPI     DM eye exam. No complaints. DM is stable. Usually wears cl. States no   gtts.   Denies F/F no pain      Hemoglobin A1C       Date                     Value               Ref Range             Status                11/10/2021               6.5 (H)             4.5 - 6.2 %           Final                  Last edited by Gina Ocasio on 4/18/2022  2:28 PM. (History)            Assessment /Plan     For exam results, see Encounter Report.    Type 2 diabetes mellitus without retinopathy  -     Ambulatory referral/consult to Ophthalmology    Choroidal nevus, left eye      1. Type 2 diabetes mellitus without retinopathy  Diabetes without retinopathy, discussed with patient importance of glucose control and follow up.  Patient voices understanding.    2. Choroidal nevus, left eye  Flat, at sup arcade  No concerning features  Low concern    F/u 1 year, OCT mac, DFE

## 2022-04-20 ENCOUNTER — OFFICE VISIT (OUTPATIENT)
Dept: FAMILY MEDICINE | Facility: CLINIC | Age: 47
End: 2022-04-20
Payer: COMMERCIAL

## 2022-04-20 VITALS
TEMPERATURE: 98 F | WEIGHT: 315 LBS | SYSTOLIC BLOOD PRESSURE: 134 MMHG | HEART RATE: 84 BPM | HEIGHT: 65 IN | BODY MASS INDEX: 52.48 KG/M2 | DIASTOLIC BLOOD PRESSURE: 82 MMHG | OXYGEN SATURATION: 97 %

## 2022-04-20 DIAGNOSIS — E78.5 HYPERLIPIDEMIA, UNSPECIFIED HYPERLIPIDEMIA TYPE: ICD-10-CM

## 2022-04-20 DIAGNOSIS — Z72.0 TOBACCO USE: ICD-10-CM

## 2022-04-20 DIAGNOSIS — E11.9 TYPE 2 DIABETES MELLITUS WITHOUT COMPLICATION, WITHOUT LONG-TERM CURRENT USE OF INSULIN: Primary | ICD-10-CM

## 2022-04-20 PROCEDURE — 3075F SYST BP GE 130 - 139MM HG: CPT | Mod: CPTII,S$GLB,, | Performed by: FAMILY MEDICINE

## 2022-04-20 PROCEDURE — 3079F PR MOST RECENT DIASTOLIC BLOOD PRESSURE 80-89 MM HG: ICD-10-PCS | Mod: CPTII,S$GLB,, | Performed by: FAMILY MEDICINE

## 2022-04-20 PROCEDURE — 3061F NEG MICROALBUMINURIA REV: CPT | Mod: CPTII,S$GLB,, | Performed by: FAMILY MEDICINE

## 2022-04-20 PROCEDURE — 1160F PR REVIEW ALL MEDS BY PRESCRIBER/CLIN PHARMACIST DOCUMENTED: ICD-10-PCS | Mod: CPTII,S$GLB,, | Performed by: FAMILY MEDICINE

## 2022-04-20 PROCEDURE — 3066F PR DOCUMENTATION OF TREATMENT FOR NEPHROPATHY: ICD-10-PCS | Mod: CPTII,S$GLB,, | Performed by: FAMILY MEDICINE

## 2022-04-20 PROCEDURE — 3061F PR NEG MICROALBUMINURIA RESULT DOCUMENTED/REVIEW: ICD-10-PCS | Mod: CPTII,S$GLB,, | Performed by: FAMILY MEDICINE

## 2022-04-20 PROCEDURE — 3075F PR MOST RECENT SYSTOLIC BLOOD PRESS GE 130-139MM HG: ICD-10-PCS | Mod: CPTII,S$GLB,, | Performed by: FAMILY MEDICINE

## 2022-04-20 PROCEDURE — 3008F PR BODY MASS INDEX (BMI) DOCUMENTED: ICD-10-PCS | Mod: CPTII,S$GLB,, | Performed by: FAMILY MEDICINE

## 2022-04-20 PROCEDURE — 99213 PR OFFICE/OUTPT VISIT, EST, LEVL III, 20-29 MIN: ICD-10-PCS | Mod: S$GLB,,, | Performed by: FAMILY MEDICINE

## 2022-04-20 PROCEDURE — 99213 OFFICE O/P EST LOW 20 MIN: CPT | Mod: S$GLB,,, | Performed by: FAMILY MEDICINE

## 2022-04-20 PROCEDURE — 3079F DIAST BP 80-89 MM HG: CPT | Mod: CPTII,S$GLB,, | Performed by: FAMILY MEDICINE

## 2022-04-20 PROCEDURE — 3008F BODY MASS INDEX DOCD: CPT | Mod: CPTII,S$GLB,, | Performed by: FAMILY MEDICINE

## 2022-04-20 PROCEDURE — 1159F MED LIST DOCD IN RCRD: CPT | Mod: CPTII,S$GLB,, | Performed by: FAMILY MEDICINE

## 2022-04-20 PROCEDURE — 1160F RVW MEDS BY RX/DR IN RCRD: CPT | Mod: CPTII,S$GLB,, | Performed by: FAMILY MEDICINE

## 2022-04-20 PROCEDURE — 3066F NEPHROPATHY DOC TX: CPT | Mod: CPTII,S$GLB,, | Performed by: FAMILY MEDICINE

## 2022-04-20 PROCEDURE — 1159F PR MEDICATION LIST DOCUMENTED IN MEDICAL RECORD: ICD-10-PCS | Mod: CPTII,S$GLB,, | Performed by: FAMILY MEDICINE

## 2022-04-20 RX ORDER — VARENICLINE TARTRATE 1 MG/1
TABLET, FILM COATED ORAL
Qty: 56 TABLET | Refills: 0 | Status: SHIPPED | OUTPATIENT
Start: 2022-04-20 | End: 2022-06-22 | Stop reason: SDUPTHER

## 2022-04-22 NOTE — PROGRESS NOTES
BMI is at 54.6.  Down 5 lb.  To have colonoscopy done May 3rd.  I exam done April 18th.  No retinopathy repeat exam in 1 year.  Diabetes mellitus type 2 using the Ozempic 0.5.  Doing well on it so far.  Tobacco use still at a pack per day.    Physical examination vital signs noted.  Overweight male no acute distress.  Neck without bruit.  Chest clear.  Heart regular rate rhythm.  Extremities without edema.  Subjective:       Patient ID: Jacoby Jean-Baptiste is a 47 y.o. male.    Chief Complaint: Follow-up (1 month)    HPI  Review of Systems    Objective:      Physical Exam    Assessment:       1. Type 2 diabetes mellitus without complication, without long-term current use of insulin    2. Tobacco use    3. BMI 50.0-59.9, adult    4. Hyperlipidemia, unspecified hyperlipidemia type        Plan:       Type 2 diabetes mellitus without complication, without long-term current use of insulin  -     Hemoglobin A1C; Future; Expected date: 04/20/2022  -     Comprehensive Metabolic Panel; Future; Expected date: 04/20/2022    Tobacco use    BMI 50.0-59.9, adult    Hyperlipidemia, unspecified hyperlipidemia type  -     Lipid Panel; Future; Expected date: 04/20/2022    Other orders  -     semaglutide (OZEMPIC) 1 mg/dose (4 mg/3 mL); Inject 1 mg into the skin every 7 days.  Dispense: 1 pen; Refill: 2  -     varenicline (CHANTIX) 1 mg Tab; Take one tab daily for two weeks then 1 tab bid  Dispense: 56 tablet; Refill: 0    Increase the Ozempic to 1 mg weekly.  4 mg pens 3 of these.  Chantix starter pack and continuation pack prescribed.  Did find pharmacy had 1 mg generic of this bottle of 56 of prescribed that.  Follow-up in 2 months with an A1c CMP and lipids.  Diet and weight reduction.

## 2022-04-29 ENCOUNTER — HOSPITAL ENCOUNTER (OUTPATIENT)
Dept: PREADMISSION TESTING | Facility: HOSPITAL | Age: 47
Discharge: HOME | End: 2022-04-29
Attending: INTERNAL MEDICINE
Payer: COMMERCIAL

## 2022-04-29 VITALS
RESPIRATION RATE: 18 BRPM | WEIGHT: 315 LBS | HEIGHT: 65 IN | BODY MASS INDEX: 52.48 KG/M2 | HEART RATE: 81 BPM | DIASTOLIC BLOOD PRESSURE: 71 MMHG | OXYGEN SATURATION: 98 % | SYSTOLIC BLOOD PRESSURE: 110 MMHG

## 2022-04-29 DIAGNOSIS — Z01.818 PRE-OP TESTING: Primary | ICD-10-CM

## 2022-04-29 PROCEDURE — 93010 EKG 12-LEAD: ICD-10-PCS | Mod: ,,, | Performed by: INTERNAL MEDICINE

## 2022-04-29 PROCEDURE — 93010 ELECTROCARDIOGRAM REPORT: CPT | Mod: ,,, | Performed by: INTERNAL MEDICINE

## 2022-04-29 PROCEDURE — 93005 ELECTROCARDIOGRAM TRACING: CPT | Performed by: INTERNAL MEDICINE

## 2022-04-29 NOTE — DISCHARGE INSTRUCTIONS
To confirm, Your doctor has instructed you that surgery is scheduled for:   Tuesday, May 3, 2022    Endoscopy will call the afternoon prior to procedure on May 2, 2022 with the final arrival time.      Please report to [facility]patient [place] via [facility] entrance. Check in at registration desk.    Do not eat or drink anything after midnight the night before your surgery - THIS INCLUDES  WATER, GUM, MINTS AND CANDY.  YOU MAY BRUSH YOUR TEETH BUT DO NOT SWALLOW     ONLY if you are diabetic, check your sugar in the morning before your procedure.       Do not take any diabetic medicines or insulin the morning of surgery .     PLEASE NOTE:  The surgery schedule has many variables which may affect the time of your surgery case.  Family members should be available if your surgery time changes.  Plan to be here the day of your procedure between 4-6 hours.      DO NOT TAKE THESE MEDICATIONS 5-7 DAYS PRIOR to your procedure or per your surgeon's request: ASPIRIN, ALEVE, ADVIL, IBUPROFEN,  MELO SELTZER, BC , FISH OIL , VITAMIN E, HERBALS  (May take Tylenol)                                                          IMPORTANT INSTRUCTIONS      Do not smoke, vape or drink alcoholic beverages 24 hours prior to your procedure.   If you wear contact lenses, dentures, hearing aids or glasses, bring a container to put them in during surgery and give to a family member for safe keeping.    Please leave all jewelry, piercing's and valuables at home.     ONLY for patients requiring bowel prep, written instructions will be given by your doctor's office.    Make arrangements in advance for transportation home by a responsible adult.    You must make arrangements for transportation, TAXI'S, UBER'S OR LYFTS ARE NOT ALLOWED.      If you have any questions about these instructions, call Pre-Op Admit  Nursing at [phone] or the Endoscopy Department at [phone].

## 2022-05-03 ENCOUNTER — ANESTHESIA EVENT (OUTPATIENT)
Dept: SURGERY | Facility: HOSPITAL | Age: 47
End: 2022-05-03
Payer: COMMERCIAL

## 2022-05-03 ENCOUNTER — HOSPITAL ENCOUNTER (OUTPATIENT)
Facility: HOSPITAL | Age: 47
Discharge: HOME | End: 2022-05-03
Attending: INTERNAL MEDICINE | Admitting: INTERNAL MEDICINE
Payer: COMMERCIAL

## 2022-05-03 ENCOUNTER — ANESTHESIA (OUTPATIENT)
Dept: SURGERY | Facility: HOSPITAL | Age: 47
End: 2022-05-03
Payer: COMMERCIAL

## 2022-05-03 VITALS
TEMPERATURE: 98 F | DIASTOLIC BLOOD PRESSURE: 85 MMHG | RESPIRATION RATE: 14 BRPM | HEART RATE: 80 BPM | DIASTOLIC BLOOD PRESSURE: 87 MMHG | SYSTOLIC BLOOD PRESSURE: 137 MMHG | WEIGHT: 315 LBS | RESPIRATION RATE: 18 BRPM | HEIGHT: 65 IN | BODY MASS INDEX: 52.48 KG/M2 | OXYGEN SATURATION: 97 % | SYSTOLIC BLOOD PRESSURE: 127 MMHG | OXYGEN SATURATION: 100 % | HEART RATE: 87 BPM

## 2022-05-03 DIAGNOSIS — Z12.11 COLON CANCER SCREENING: ICD-10-CM

## 2022-05-03 LAB — GLUCOSE SERPL-MCNC: 106 MG/DL (ref 70–110)

## 2022-05-03 PROCEDURE — 27201089 HC SNARE, DISP (ANY): Performed by: INTERNAL MEDICINE

## 2022-05-03 PROCEDURE — 25000003 PHARM REV CODE 250: Performed by: NURSE ANESTHETIST, CERTIFIED REGISTERED

## 2022-05-03 PROCEDURE — 27201114 HC TRAP (ANY): Performed by: INTERNAL MEDICINE

## 2022-05-03 PROCEDURE — 63600175 PHARM REV CODE 636 W HCPCS: Performed by: NURSE ANESTHETIST, CERTIFIED REGISTERED

## 2022-05-03 PROCEDURE — 37000009 HC ANESTHESIA EA ADD 15 MINS: Performed by: INTERNAL MEDICINE

## 2022-05-03 PROCEDURE — 45380 COLONOSCOPY AND BIOPSY: CPT | Performed by: INTERNAL MEDICINE

## 2022-05-03 PROCEDURE — 27200043 HC FORCEPS, BIOPSY: Performed by: INTERNAL MEDICINE

## 2022-05-03 PROCEDURE — 82962 GLUCOSE BLOOD TEST: CPT | Performed by: INTERNAL MEDICINE

## 2022-05-03 PROCEDURE — 45385 COLONOSCOPY W/LESION REMOVAL: CPT | Performed by: INTERNAL MEDICINE

## 2022-05-03 PROCEDURE — 37000008 HC ANESTHESIA 1ST 15 MINUTES: Performed by: INTERNAL MEDICINE

## 2022-05-03 RX ORDER — SODIUM CHLORIDE 9 MG/ML
INJECTION, SOLUTION INTRAVENOUS CONTINUOUS
Status: DISCONTINUED | OUTPATIENT
Start: 2022-05-03 | End: 2022-05-03 | Stop reason: HOSPADM

## 2022-05-03 RX ORDER — PROPOFOL 10 MG/ML
VIAL (ML) INTRAVENOUS
Status: DISCONTINUED | OUTPATIENT
Start: 2022-05-03 | End: 2022-05-03

## 2022-05-03 RX ORDER — LIDOCAINE HYDROCHLORIDE 10 MG/ML
INJECTION INFILTRATION; PERINEURAL
Status: DISCONTINUED | OUTPATIENT
Start: 2022-05-03 | End: 2022-05-03

## 2022-05-03 RX ORDER — SODIUM CHLORIDE 0.9 % (FLUSH) 0.9 %
2 SYRINGE (ML) INJECTION
Status: DISCONTINUED | OUTPATIENT
Start: 2022-05-03 | End: 2022-05-03 | Stop reason: HOSPADM

## 2022-05-03 RX ADMIN — PROPOFOL 30 MG: 10 INJECTION, EMULSION INTRAVENOUS at 08:05

## 2022-05-03 RX ADMIN — SODIUM CHLORIDE: 0.9 INJECTION, SOLUTION INTRAVENOUS at 08:05

## 2022-05-03 RX ADMIN — PROPOFOL 50 MG: 10 INJECTION, EMULSION INTRAVENOUS at 08:05

## 2022-05-03 RX ADMIN — PROPOFOL 40 MG: 10 INJECTION, EMULSION INTRAVENOUS at 08:05

## 2022-05-03 RX ADMIN — LIDOCAINE HYDROCHLORIDE 30 MG: 10 INJECTION, SOLUTION INFILTRATION; PERINEURAL at 08:05

## 2022-05-03 NOTE — H&P
GASTROENTEROLOGY PRE-PROCEDURE H&P NOTE  Patient Name: Jacoby Jean-Baptiste  Patient MRN: [id number]  Patient : 1975    Service date: 5/3/2022    PCP: Hunter Aguilar III, MD    No chief complaint on file.      HPI: Patient is a 47 y.o. male with PMHx as below here for evaluation of screening colon    Past Medical History:  Past Medical History:   Diagnosis Date    Diabetes mellitus 2022    Hyperlipidemia     Hypertension     Insomnia         Past Surgical History:  Past Surgical History:   Procedure Laterality Date    CARDIAC ELECTROPHYSIOLOGY MAPPING AND ABLATION  2017    SVT    CHOLECYSTECTOMY  2011    GANGLION CYST EXCISION Left 1992    UMBILICAL HERNIA REPAIR      with mesh        Home Medications:  Medications Prior to Admission   Medication Sig Dispense Refill Last Dose    metFORMIN (GLUCOPHAGE-XR) 500 MG ER 24hr tablet Take 1 tablet (500 mg total) by mouth 2 (two) times daily with meals. 180 tablet 1 2022 at Unknown time    metoprolol succinate (TOPROL-XL) 50 MG 24 hr tablet Take 1 tablet (50 mg total) by mouth once daily. (Patient taking differently: Take 50 mg by mouth every evening.) 90 tablet 1 2022 at Unknown time    simvastatin (ZOCOR) 20 MG tablet Take 1 tablet (20 mg total) by mouth nightly. 90 tablet 1 2022 at Unknown time    varenicline (CHANTIX) 1 mg Tab Take one tab daily for two weeks then 1 tab bid (Patient taking differently: Take 1 mg by mouth every evening. Take one tab daily for two weeks then 1 tab bid) 56 tablet 0 2022 at Unknown time    semaglutide (OZEMPIC) 1 mg/dose (4 mg/3 mL) Inject 1 mg into the skin every 7 days. (Patient taking differently: Inject 1 mg into the skin every 7 days. Saturday's) 1 pen 2 2022    zolpidem (AMBIEN) 10 mg Tab Take 1 tablet (10 mg total) by mouth every evening. 30 tablet 5 2022       Inpatient Medications:    sodium chloride 0.9%    Review of patient's allergies indicates:  No Known  "Allergies    Social History:   Social History     Occupational History    Not on file   Tobacco Use    Smoking status: Current Every Day Smoker     Packs/day: 1.00     Years: 30.00     Pack years: 30.00     Types: Cigarettes    Smokeless tobacco: Never Used    Tobacco comment: DO NOT SMOKE DOS   Substance and Sexual Activity    Alcohol use: Yes     Comment: ocass    Drug use: Not Currently    Sexual activity: Not Currently       Family History:   History reviewed. No pertinent family history.    Review of Systems:  A 10 point review of systems was performed and was normal, except as mentioned in the HPI, including constitutional, HEENT, heme, lymph, cardiovascular, respiratory, gastrointestinal, genitourinary, neurologic, endocrine, psychiatric and musculoskeletal.      OBJECTIVE:    Physical Exam:  24 Hour Vital Sign Ranges: Temp:  [97.7 °F (36.5 °C)] 97.7 °F (36.5 °C)  Pulse:  [86] 86  Resp:  [18] 18  SpO2:  [97 %] 97 %  BP: (133)/(71) 133/71  Most recent vitals: /71   Pulse 86   Temp 97.7 °F (36.5 °C)   Resp 18   Ht 5' 5" (1.651 m)   Wt (!) 145.2 kg (320 lb)   SpO2 97%   BMI 53.25 kg/m²    GEN: well-developed, well-nourished, awake and alert, non-toxic appearing adult  HEENT: PERRL, sclera anicteric, oral mucosa pink and moist without lesion  NECK: trachea midline; Good ROM  CV: regular rate and rhythm, no murmurs or gallops  RESP: clear to auscultation bilaterally, no wheezes, rhonci or rales  ABD: soft, non-tender, non-distended, normal bowel sounds  EXT: no swelling or edema, 2+ pulses distally  SKIN: no rashes or jaundice  PSYCH: normal affect    Labs:   No results for input(s): WBC, MCV, PLT in the last 72 hours.    Invalid input(s): HGBAU  No results for input(s): NA, K, CL, CO2, BUN, GLU in the last 72 hours.    Invalid input(s): CREA  No results for input(s): ALB in the last 72 hours.    Invalid input(s): ALKP, SGOT, SGPT, TBIL, DBIL, TPRO  No results for input(s): PT, INR, PTT in the " last 72 hours.      IMPRESSION / RECOMMENDATIONS:  Colon w/ interventions.   RIsks, benefits, alternatives discussed in detail regarding upcoming procedures and sedation. Some of the more common endoscopic complications include but not limited to immediate or delayed perforation, bleeding, infections, pain, inadvertent injury to surrounding tissue / organs and possible need for surgical evaluation. Patient expressed understanding, all questions answered and will proceed with procedure as planned.     Shan Jean III  5/3/2022  8:15 AM

## 2022-05-03 NOTE — [employer]OVATION PATIENT INSTRUCTIONS
Discharge Summary/Instructions after an Endoscopic Procedure  Patient Name: Jacoby Jean-Baptiste  Patient MRN: [id number]  Patient YOB: 1975  Tuesday, May 3, 2022  Shan Jean III, MD  RESTRICTIONS:  During your procedure today, you received medications for sedation.  These   medications may affect your judgment, balance and coordination.  Therefore,   for 24 hours, you have the following restrictions:   - DO NOT drive a car, operate machinery, make legal/financial decisions,   sign important papers or drink alcohol.    ACTIVITY:  Today: no heavy lifting, straining or running due to procedural   sedation/anesthesia.  The following day: return to full activity including work.  DIET:  Eat and drink normally unless instructed otherwise.     TREATMENT FOR COMMON SIDE EFFECTS:  - Mild abdominal pain, nausea, belching, bloating or excessive gas:  rest,   eat lightly and use a heating pad.  - Sore Throat: treat with throat lozenges and/or gargle with warm salt   water.  - Because air was used during the procedure, expelling large amounts of air   from your rectum or belching is normal.  - If a bowel prep was taken, you may not have a bowel movement for 1-3 days.    This is normal.  SYMPTOMS TO WATCH FOR AND REPORT TO YOUR PHYSICIAN:  1. Abdominal pain or bloating, other than gas cramps.  2. Chest pain.  3. Back pain.  4. Signs of infection such as: chills or fever occurring within 24 hours   after the procedure.  5. Rectal bleeding, which would show as bright red, maroon, or black stools.   (A tablespoon of blood from the rectum is not serious, especially if   hemorrhoids are present.)  6. Vomiting.  7. Weakness or dizziness.  GO DIRECTLY TO THE NEAREST EMERGENCY ROOM IF YOU HAVE ANY OF THE FOLLOWING:      Difficulty breathing              Chills and/or fever over 101 F   Persistent vomiting and/or vomiting blood   Severe abdominal pain   Severe chest pain   Black, tarry stools   Bleeding- more than one  tablespoon   Any other symptom or condition that you feel may need urgent attention  Your doctor recommends these additional instructions:  If any biopsies were taken, your doctors clinic will contact you in 1 to 2   weeks with any results.  - Patient has a contact number available for emergencies.  The signs and   symptoms of potential delayed complications were discussed with the   patient.  Return to normal activities tomorrow.  Written discharge   instructions were provided to the patient.   - Resume previous diet.   - Discharge patient to home (ambulatory).   - Continue present medications.   - Repeat colonoscopy in 3 years for surveillance.   - Return to GI clinic PRN.  For questions, problems or results please call your physician - Shan Jean III, MD at Work:  [phone].  [facility], EMERGENCY ROOM PHONE NUMBER: [phone]  IF A COMPLICATION OR EMERGENCY SITUATION ARISES AND YOU ARE UNABLE TO REACH   YOUR PHYSICIAN - GO DIRECTLY TO THE EMERGENCY ROOM.  Shan Jean III, MD  5/3/2022 8:42:01 AM  This report has been verified and signed electronically.  Dear patient,  As a result of recent federal legislation (The Federal Cures Act), you may   receive lab or pathology results from your procedure in your [facility]   account before your physician is able to contact you. Your physician or   their representative will relay the results to you with their   recommendations at their soonest availability.  Thank you,  PR[employer]ATION

## 2022-05-03 NOTE — ADDENDUM NOTE
Addendum  created 05/03/22 0943 by Martina Hand, JOSE    Flowsheet accepted, Intraprocedure Event edited, Intraprocedure Meds edited, Intraprocedure Staff edited, Orders acknowledged in Narrator

## 2022-05-03 NOTE — TRANSFER OF CARE
"Anesthesia Transfer of Care Note    Patient: Jacoby Jean-Baptiste    Procedure(s) Performed: Procedure(s) (LRB):  COLONOSCOPY (N/A)    Patient location: GI    Anesthesia Type: MAC    Transport from OR: Transported from OR on room air with adequate spontaneous ventilation    Post pain: adequate analgesia    Post assessment: no apparent anesthetic complications    Post vital signs: stable    Level of consciousness: awake and alert    Nausea/Vomiting: no nausea/vomiting    Complications: none    Transfer of care protocol was followed      Last vitals:   Visit Vitals  /71   Pulse 86   Temp 36.5 °C (97.7 °F)   Resp 18   Ht 5' 5" (1.651 m)   Wt (!) 145.2 kg (320 lb)   SpO2 97%   BMI 53.25 kg/m²     "

## 2022-05-03 NOTE — ANESTHESIA PREPROCEDURE EVALUATION
05/03/2022  Jacoby Jean-Baptiste is a 47 y.o., male.      Patient Active Problem List   Diagnosis    Insomnia    Prediabetes    Morbid obesity    Tobacco use    History of supraventricular tachycardia    H/O cardiac radiofrequency ablation    Hyperlipidemia    Type 2 diabetes mellitus without complication, without long-term current use of insulin    Positive colorectal cancer screening using Cologuard test       Past Surgical History:   Procedure Laterality Date    CARDIAC ELECTROPHYSIOLOGY MAPPING AND ABLATION  2017    SVT    CHOLECYSTECTOMY  2011    GANGLION CYST EXCISION Left 1992    UMBILICAL HERNIA REPAIR  2011    with mesh        Tobacco Use:  The patient  reports that he has been smoking cigarettes. He has a 30.00 pack-year smoking history. He has never used smokeless tobacco.     Results for orders placed or performed during the hospital encounter of 04/29/22   EKG 12-lead    Collection Time: 04/29/22 10:31 AM    Narrative    Test Reason : Z01.818,    Vent. Rate : 076 BPM     Atrial Rate : 076 BPM     P-R Int : 160 ms          QRS Dur : 078 ms      QT Int : 384 ms       P-R-T Axes : 040 045 049 degrees     QTc Int : 432 ms    Normal sinus rhythm  Normal ECG  No previous ECGs available    Referred By:  DEVORA           Confirmed By:              Lab Results   Component Value Date    WBC 14.96 (H) 04/29/2022    HGB 14.1 04/29/2022    HCT 43.3 04/29/2022    MCV 93 04/29/2022     04/29/2022     BMP  Lab Results   Component Value Date     04/29/2022    K 4.0 04/29/2022    CL 99 04/29/2022    CO2 27 04/29/2022    BUN 14 04/29/2022    CREATININE 0.9 04/29/2022    CALCIUM 9.4 04/29/2022    ANIONGAP 12 04/29/2022    GLU 89 04/29/2022     (H) 11/10/2021     (H) 03/10/2021       No results found for this or any previous visit.            Pre-op Assessment    I have reviewed  the Patient Summary Reports.     I have reviewed the Nursing Notes. I have reviewed the NPO Status.   I have reviewed the Medications.     Review of Systems  Anesthesia Hx:  No problems with previous Anesthesia Denies Hx of Anesthetic complications  Denies Family Hx of Anesthesia complications.   Denies Personal Hx of Anesthesia complications.   Social:  Smoker    Hematology/Oncology:  Hematology Normal   Oncology Normal     EENT/Dental:EENT/Dental Normal   Cardiovascular:   Hypertension hyperlipidemia ECG has been reviewed.    Pulmonary:  Pulmonary Normal    Renal/:  Renal/ Normal     Hepatic/GI:  Hepatic/GI Normal    Musculoskeletal:  Musculoskeletal Normal    Neurological:  Neurology Normal    Endocrine:   Diabetes, type 2, using insulin  Morbid Obesity / BMI > 40  Psych:  Psychiatric Normal           Physical Exam  General: Well nourished    Airway:  Mallampati: II   Mouth Opening: Normal  TM Distance: Normal  Tongue: Normal  Neck ROM: Normal ROM    Dental:  Intact    Chest/Lungs:  Clear to auscultation    Heart:  Rate: Normal  Rhythm: Regular Rhythm  Sounds: Normal        Anesthesia Plan  Type of Anesthesia, risks & benefits discussed:    Anesthesia Type: MAC  Intra-op Monitoring Plan: Standard ASA Monitors  Informed Consent: Informed consent signed with the Patient and all parties understand the risks and agree with anesthesia plan.  All questions answered.   ASA Score: 3    Ready For Surgery From Anesthesia Perspective.     .

## 2022-05-03 NOTE — ANESTHESIA POSTPROCEDURE EVALUATION
Anesthesia Post Evaluation    Patient: Jacoby Jean-Baptiste    Procedure(s) Performed: Procedure(s) (LRB):  COLONOSCOPY (N/A)    Final Anesthesia Type: MAC      Patient location during evaluation: GI PACU  Patient participation: Yes- Able to Participate  Level of consciousness: awake and alert  Post-procedure vital signs: reviewed and stable  Pain management: adequate  Airway patency: patent    PONV status at discharge: No PONV  Anesthetic complications: no      Cardiovascular status: hemodynamically stable  Respiratory status: unassisted, spontaneous ventilation and room air  Hydration status: euvolemic  Follow-up not needed.          Vitals Value Taken Time   /87 05/03/22 0910   Temp  05/03/22 0915   Pulse 80 05/03/22 0910   Resp 18 05/03/22 0910   SpO2 97 % 05/03/22 0910         Event Time   Out of Recovery 09:15:12         Pain/Kusum Score: No data recorded

## 2022-05-03 NOTE — DISCHARGE INSTRUCTIONS
No driving for 24 hours  No alcohol for 24 hours  Do not make any critical decisions or sign legal documents until tomorrow.  Avoid gassy foods today (broccoli, beans, or cabbage.)  Doctor Ted will call you with results of pathology in about 1 week.

## 2022-05-04 ENCOUNTER — TELEPHONE (OUTPATIENT)
Dept: FAMILY MEDICINE | Facility: CLINIC | Age: 47
End: 2022-05-04
Payer: COMMERCIAL

## 2022-05-04 NOTE — TELEPHONE ENCOUNTER
PT ADVISED COLONOSCOPY  ----- Message from Hunter Aguilar III, MD sent at 5/3/2022  9:08 PM CDT -----  Repeat in 3 years.  FOLLOW-UP AS RECOMMENDED

## 2022-06-01 ENCOUNTER — PATIENT MESSAGE (OUTPATIENT)
Dept: ADMINISTRATIVE | Facility: HOSPITAL | Age: 47
End: 2022-06-01
Payer: COMMERCIAL

## 2022-06-07 ENCOUNTER — LAB VISIT (OUTPATIENT)
Dept: LAB | Facility: HOSPITAL | Age: 47
End: 2022-06-07
Attending: FAMILY MEDICINE
Payer: COMMERCIAL

## 2022-06-07 DIAGNOSIS — E11.9 TYPE 2 DIABETES MELLITUS WITHOUT COMPLICATION, WITHOUT LONG-TERM CURRENT USE OF INSULIN: ICD-10-CM

## 2022-06-07 DIAGNOSIS — E78.5 HYPERLIPIDEMIA, UNSPECIFIED HYPERLIPIDEMIA TYPE: ICD-10-CM

## 2022-06-07 LAB
ALBUMIN SERPL BCP-MCNC: 3.8 G/DL (ref 3.5–5.2)
ALP SERPL-CCNC: 52 U/L (ref 55–135)
ALT SERPL W/O P-5'-P-CCNC: 18 U/L (ref 10–44)
ANION GAP SERPL CALC-SCNC: 11 MMOL/L (ref 8–16)
AST SERPL-CCNC: 15 U/L (ref 10–40)
BILIRUB SERPL-MCNC: 0.4 MG/DL (ref 0.1–1)
BUN SERPL-MCNC: 14 MG/DL (ref 6–20)
CALCIUM SERPL-MCNC: 9.2 MG/DL (ref 8.7–10.5)
CHLORIDE SERPL-SCNC: 101 MMOL/L (ref 95–110)
CHOLEST SERPL-MCNC: 111 MG/DL (ref 120–199)
CHOLEST/HDLC SERPL: 4 {RATIO} (ref 2–5)
CO2 SERPL-SCNC: 26 MMOL/L (ref 23–29)
CREAT SERPL-MCNC: 0.9 MG/DL (ref 0.5–1.4)
EST. GFR  (AFRICAN AMERICAN): >60 ML/MIN/1.73 M^2
EST. GFR  (NON AFRICAN AMERICAN): >60 ML/MIN/1.73 M^2
ESTIMATED AVG GLUCOSE: 108 MG/DL (ref 68–131)
GLUCOSE SERPL-MCNC: 102 MG/DL (ref 70–110)
HBA1C MFR BLD: 5.4 % (ref 4.5–6.2)
HDLC SERPL-MCNC: 28 MG/DL (ref 40–75)
HDLC SERPL: 25.2 % (ref 20–50)
LDLC SERPL CALC-MCNC: 58.4 MG/DL (ref 63–159)
NONHDLC SERPL-MCNC: 83 MG/DL
POTASSIUM SERPL-SCNC: 4.1 MMOL/L (ref 3.5–5.1)
PROT SERPL-MCNC: 7.4 G/DL (ref 6–8.4)
SODIUM SERPL-SCNC: 138 MMOL/L (ref 136–145)
TRIGL SERPL-MCNC: 123 MG/DL (ref 30–150)

## 2022-06-07 PROCEDURE — 36415 COLL VENOUS BLD VENIPUNCTURE: CPT | Performed by: FAMILY MEDICINE

## 2022-06-07 PROCEDURE — 80061 LIPID PANEL: CPT | Performed by: FAMILY MEDICINE

## 2022-06-07 PROCEDURE — 83036 HEMOGLOBIN GLYCOSYLATED A1C: CPT | Performed by: FAMILY MEDICINE

## 2022-06-07 PROCEDURE — 80053 COMPREHEN METABOLIC PANEL: CPT | Performed by: FAMILY MEDICINE

## 2022-06-22 ENCOUNTER — OFFICE VISIT (OUTPATIENT)
Dept: FAMILY MEDICINE | Facility: CLINIC | Age: 47
End: 2022-06-22
Payer: COMMERCIAL

## 2022-06-22 VITALS
SYSTOLIC BLOOD PRESSURE: 128 MMHG | OXYGEN SATURATION: 99 % | WEIGHT: 313.19 LBS | BODY MASS INDEX: 52.18 KG/M2 | DIASTOLIC BLOOD PRESSURE: 86 MMHG | TEMPERATURE: 98 F | HEIGHT: 65 IN | HEART RATE: 80 BPM

## 2022-06-22 DIAGNOSIS — E66.01 MORBID OBESITY: ICD-10-CM

## 2022-06-22 DIAGNOSIS — Z98.890 H/O CARDIAC RADIOFREQUENCY ABLATION: ICD-10-CM

## 2022-06-22 DIAGNOSIS — K63.5 POLYP OF COLON, UNSPECIFIED PART OF COLON, UNSPECIFIED TYPE: ICD-10-CM

## 2022-06-22 DIAGNOSIS — G47.00 INSOMNIA, UNSPECIFIED TYPE: ICD-10-CM

## 2022-06-22 DIAGNOSIS — K52.9 COLITIS: ICD-10-CM

## 2022-06-22 DIAGNOSIS — Z72.0 TOBACCO USE: ICD-10-CM

## 2022-06-22 DIAGNOSIS — E78.5 HYPERLIPIDEMIA, UNSPECIFIED HYPERLIPIDEMIA TYPE: Primary | ICD-10-CM

## 2022-06-22 DIAGNOSIS — Z86.79 HISTORY OF SUPRAVENTRICULAR TACHYCARDIA: ICD-10-CM

## 2022-06-22 DIAGNOSIS — E11.9 TYPE 2 DIABETES MELLITUS WITHOUT COMPLICATION, WITHOUT LONG-TERM CURRENT USE OF INSULIN: ICD-10-CM

## 2022-06-22 PROCEDURE — 90471 IMMUNIZATION ADMIN: CPT | Mod: S$GLB,,, | Performed by: FAMILY MEDICINE

## 2022-06-22 PROCEDURE — 3066F NEPHROPATHY DOC TX: CPT | Mod: CPTII,S$GLB,, | Performed by: FAMILY MEDICINE

## 2022-06-22 PROCEDURE — 1159F MED LIST DOCD IN RCRD: CPT | Mod: CPTII,S$GLB,, | Performed by: FAMILY MEDICINE

## 2022-06-22 PROCEDURE — 3066F PR DOCUMENTATION OF TREATMENT FOR NEPHROPATHY: ICD-10-PCS | Mod: CPTII,S$GLB,, | Performed by: FAMILY MEDICINE

## 2022-06-22 PROCEDURE — 90677 PCV20 VACCINE IM: CPT | Mod: S$GLB,,, | Performed by: FAMILY MEDICINE

## 2022-06-22 PROCEDURE — 90471 PNEUMOCOCCAL CONJUGATE VACCINE 20-VALENT: ICD-10-PCS | Mod: S$GLB,,, | Performed by: FAMILY MEDICINE

## 2022-06-22 PROCEDURE — 1160F PR REVIEW ALL MEDS BY PRESCRIBER/CLIN PHARMACIST DOCUMENTED: ICD-10-PCS | Mod: CPTII,S$GLB,, | Performed by: FAMILY MEDICINE

## 2022-06-22 PROCEDURE — 1159F PR MEDICATION LIST DOCUMENTED IN MEDICAL RECORD: ICD-10-PCS | Mod: CPTII,S$GLB,, | Performed by: FAMILY MEDICINE

## 2022-06-22 PROCEDURE — 3061F NEG MICROALBUMINURIA REV: CPT | Mod: CPTII,S$GLB,, | Performed by: FAMILY MEDICINE

## 2022-06-22 PROCEDURE — 3061F PR NEG MICROALBUMINURIA RESULT DOCUMENTED/REVIEW: ICD-10-PCS | Mod: CPTII,S$GLB,, | Performed by: FAMILY MEDICINE

## 2022-06-22 PROCEDURE — 99214 PR OFFICE/OUTPT VISIT, EST, LEVL IV, 30-39 MIN: ICD-10-PCS | Mod: 25,S$GLB,, | Performed by: FAMILY MEDICINE

## 2022-06-22 PROCEDURE — 3079F PR MOST RECENT DIASTOLIC BLOOD PRESSURE 80-89 MM HG: ICD-10-PCS | Mod: CPTII,S$GLB,, | Performed by: FAMILY MEDICINE

## 2022-06-22 PROCEDURE — 3074F PR MOST RECENT SYSTOLIC BLOOD PRESSURE < 130 MM HG: ICD-10-PCS | Mod: CPTII,S$GLB,, | Performed by: FAMILY MEDICINE

## 2022-06-22 PROCEDURE — 3074F SYST BP LT 130 MM HG: CPT | Mod: CPTII,S$GLB,, | Performed by: FAMILY MEDICINE

## 2022-06-22 PROCEDURE — 90677 PNEUMOCOCCAL CONJUGATE VACCINE 20-VALENT: ICD-10-PCS | Mod: S$GLB,,, | Performed by: FAMILY MEDICINE

## 2022-06-22 PROCEDURE — 3044F HG A1C LEVEL LT 7.0%: CPT | Mod: CPTII,S$GLB,, | Performed by: FAMILY MEDICINE

## 2022-06-22 PROCEDURE — 3008F PR BODY MASS INDEX (BMI) DOCUMENTED: ICD-10-PCS | Mod: CPTII,S$GLB,, | Performed by: FAMILY MEDICINE

## 2022-06-22 PROCEDURE — 3079F DIAST BP 80-89 MM HG: CPT | Mod: CPTII,S$GLB,, | Performed by: FAMILY MEDICINE

## 2022-06-22 PROCEDURE — 99214 OFFICE O/P EST MOD 30 MIN: CPT | Mod: 25,S$GLB,, | Performed by: FAMILY MEDICINE

## 2022-06-22 PROCEDURE — 3008F BODY MASS INDEX DOCD: CPT | Mod: CPTII,S$GLB,, | Performed by: FAMILY MEDICINE

## 2022-06-22 PROCEDURE — 1160F RVW MEDS BY RX/DR IN RCRD: CPT | Mod: CPTII,S$GLB,, | Performed by: FAMILY MEDICINE

## 2022-06-22 PROCEDURE — 3044F PR MOST RECENT HEMOGLOBIN A1C LEVEL <7.0%: ICD-10-PCS | Mod: CPTII,S$GLB,, | Performed by: FAMILY MEDICINE

## 2022-06-22 RX ORDER — VARENICLINE TARTRATE 1 MG/1
TABLET, FILM COATED ORAL
Qty: 56 TABLET | Refills: 1 | Status: SHIPPED | OUTPATIENT
Start: 2022-06-22 | End: 2023-02-20

## 2022-06-22 RX ORDER — SIMVASTATIN 20 MG/1
20 TABLET, FILM COATED ORAL NIGHTLY
Qty: 90 TABLET | Refills: 1 | Status: ON HOLD | OUTPATIENT
Start: 2022-06-22 | End: 2023-01-12

## 2022-06-22 RX ORDER — METOPROLOL SUCCINATE 50 MG/1
50 TABLET, EXTENDED RELEASE ORAL DAILY
Qty: 90 TABLET | Refills: 1 | Status: ON HOLD | OUTPATIENT
Start: 2022-06-22 | End: 2023-01-12

## 2022-06-22 RX ORDER — METFORMIN HYDROCHLORIDE 500 MG/1
500 TABLET, EXTENDED RELEASE ORAL 2 TIMES DAILY WITH MEALS
Qty: 180 TABLET | Refills: 1 | Status: ON HOLD | OUTPATIENT
Start: 2022-06-22 | End: 2023-01-12

## 2022-06-22 RX ORDER — ESZOPICLONE 3 MG/1
3 TABLET, FILM COATED ORAL NIGHTLY
Qty: 30 TABLET | Refills: 2 | Status: SHIPPED | OUTPATIENT
Start: 2022-06-22 | End: 2022-07-22

## 2022-06-22 RX ORDER — MESALAMINE 1.2 G/1
2.4 TABLET, DELAYED RELEASE ORAL DAILY
COMMUNITY
Start: 2022-05-12 | End: 2022-07-16

## 2022-06-22 RX ORDER — ZOLPIDEM TARTRATE 10 MG/1
10 TABLET ORAL NIGHTLY
Qty: 30 TABLET | Refills: 5 | Status: CANCELLED | OUTPATIENT
Start: 2022-06-22

## 2022-06-22 NOTE — PROGRESS NOTES
Subjective:       Patient ID: Jacoby Jean-Baptiste is a 47 y.o. male.    Chief Complaint: Follow-up (2 month)    HPI   Patient presents to clinic for follow-up and medication refills. Cardiovascular good. Denies chest pain or palpitations. Cholesterol 111 HDL 28 LDL 58.4. A1C 5.4. . Weight down to 313. BMI 52. Using Ozempic 1 mg weekly. Tolerating well. No GI complaints. History of positive cologaurd test. Colonoscopy current. Some polyps and chronic colitis associated with diverticulitis. Returns in October.  Currently taking mesalamine. Is still smoking but has cut back. Taking Chantix. Insomnia ok but has trouble staying asleep. Discontinue Ambien. Switch to Lunesta. PNP negative. Due for pnemo vaccination.   Review of Systems   Cardiovascular: Negative for chest pain and palpitations.   Gastrointestinal: Negative for abdominal pain.         Objective:      Physical Exam  Constitutional:       Appearance: Normal appearance. He is obese.   Neck:      Vascular: No carotid bruit.   Cardiovascular:      Rate and Rhythm: Normal rate and regular rhythm.      Pulses: Normal pulses.      Heart sounds: Normal heart sounds.   Pulmonary:      Effort: Pulmonary effort is normal.      Breath sounds: Normal breath sounds.   Lymphadenopathy:      Cervical: No cervical adenopathy.   Skin:     General: Skin is warm and dry.   Neurological:      Mental Status: He is alert.   Psychiatric:         Mood and Affect: Mood normal.         Behavior: Behavior normal.         Assessment/Plan:     Hyperlipidemia, unspecified hyperlipidemia type    Insomnia, unspecified type    Tobacco use    Type 2 diabetes mellitus without complication, without long-term current use of insulin    BMI 50.0-59.9, adult    Morbid obesity    History of supraventricular tachycardia    H/O cardiac radiofrequency ablation    Polyp of colon, unspecified part of colon, unspecified type    Colitis    Other orders  -     metFORMIN (GLUCOPHAGE-XR) 500 MG ER 24hr  tablet; Take 1 tablet (500 mg total) by mouth 2 (two) times daily with meals.  Dispense: 180 tablet; Refill: 1  -     metoprolol succinate (TOPROL-XL) 50 MG 24 hr tablet; Take 1 tablet (50 mg total) by mouth once daily.  Dispense: 90 tablet; Refill: 1  -     semaglutide (OZEMPIC) 1 mg/dose (4 mg/3 mL); Inject 1 mg into the skin every 7 days.  Dispense: 1 pen; Refill: 3  -     simvastatin (ZOCOR) 20 MG tablet; Take 1 tablet (20 mg total) by mouth nightly.  Dispense: 90 tablet; Refill: 1  -     varenicline (CHANTIX) 1 mg Tab; Take one tab daily for two weeks then 1 tab bid  Dispense: 56 tablet; Refill: 1  -     (In Office Administered) Pneumococcal Conjugate Vaccine (20 Valent) (IM)  -     eszopiclone (LUNESTA) 3 mg Tab; Take 1 tablet (3 mg total) by mouth every evening.  Dispense: 30 tablet; Refill: 2       Refilled medications.  Change Ambien to Lunesta 3 mg see if this works better for him.  Prevnar 30 recommended.  Follow-up with GI regarding colon polyps.  Follow-up here in 3 months.

## 2022-06-23 PROBLEM — K63.5 POLYP OF COLON: Status: ACTIVE | Noted: 2022-06-23

## 2022-06-23 PROBLEM — K52.9 COLITIS: Status: ACTIVE | Noted: 2022-06-23

## 2022-07-11 ENCOUNTER — PATIENT MESSAGE (OUTPATIENT)
Dept: FAMILY MEDICINE | Facility: CLINIC | Age: 47
End: 2022-07-11
Payer: COMMERCIAL

## 2022-07-15 ENCOUNTER — OFFICE VISIT (OUTPATIENT)
Dept: FAMILY MEDICINE | Facility: CLINIC | Age: 47
End: 2022-07-15
Payer: COMMERCIAL

## 2022-07-15 VITALS
OXYGEN SATURATION: 97 % | DIASTOLIC BLOOD PRESSURE: 76 MMHG | BODY MASS INDEX: 52 KG/M2 | HEIGHT: 65 IN | WEIGHT: 312.13 LBS | HEART RATE: 82 BPM | SYSTOLIC BLOOD PRESSURE: 118 MMHG | TEMPERATURE: 98 F | RESPIRATION RATE: 18 BRPM

## 2022-07-15 DIAGNOSIS — R00.2 PALPITATIONS: Primary | ICD-10-CM

## 2022-07-15 PROCEDURE — 3044F HG A1C LEVEL LT 7.0%: CPT | Mod: CPTII,S$GLB,, | Performed by: FAMILY MEDICINE

## 2022-07-15 PROCEDURE — 3074F PR MOST RECENT SYSTOLIC BLOOD PRESSURE < 130 MM HG: ICD-10-PCS | Mod: CPTII,S$GLB,, | Performed by: FAMILY MEDICINE

## 2022-07-15 PROCEDURE — 3078F DIAST BP <80 MM HG: CPT | Mod: CPTII,S$GLB,, | Performed by: FAMILY MEDICINE

## 2022-07-15 PROCEDURE — 3066F NEPHROPATHY DOC TX: CPT | Mod: CPTII,S$GLB,, | Performed by: FAMILY MEDICINE

## 2022-07-15 PROCEDURE — 3008F PR BODY MASS INDEX (BMI) DOCUMENTED: ICD-10-PCS | Mod: CPTII,S$GLB,, | Performed by: FAMILY MEDICINE

## 2022-07-15 PROCEDURE — 3044F PR MOST RECENT HEMOGLOBIN A1C LEVEL <7.0%: ICD-10-PCS | Mod: CPTII,S$GLB,, | Performed by: FAMILY MEDICINE

## 2022-07-15 PROCEDURE — 1159F PR MEDICATION LIST DOCUMENTED IN MEDICAL RECORD: ICD-10-PCS | Mod: CPTII,S$GLB,, | Performed by: FAMILY MEDICINE

## 2022-07-15 PROCEDURE — 3066F PR DOCUMENTATION OF TREATMENT FOR NEPHROPATHY: ICD-10-PCS | Mod: CPTII,S$GLB,, | Performed by: FAMILY MEDICINE

## 2022-07-15 PROCEDURE — 3061F NEG MICROALBUMINURIA REV: CPT | Mod: CPTII,S$GLB,, | Performed by: FAMILY MEDICINE

## 2022-07-15 PROCEDURE — 99213 OFFICE O/P EST LOW 20 MIN: CPT | Mod: S$GLB,,, | Performed by: FAMILY MEDICINE

## 2022-07-15 PROCEDURE — 1160F PR REVIEW ALL MEDS BY PRESCRIBER/CLIN PHARMACIST DOCUMENTED: ICD-10-PCS | Mod: CPTII,S$GLB,, | Performed by: FAMILY MEDICINE

## 2022-07-15 PROCEDURE — 1159F MED LIST DOCD IN RCRD: CPT | Mod: CPTII,S$GLB,, | Performed by: FAMILY MEDICINE

## 2022-07-15 PROCEDURE — 93010 ELECTROCARDIOGRAM REPORT: CPT | Mod: S$GLB,,, | Performed by: SPECIALIST

## 2022-07-15 PROCEDURE — 99213 PR OFFICE/OUTPT VISIT, EST, LEVL III, 20-29 MIN: ICD-10-PCS | Mod: S$GLB,,, | Performed by: FAMILY MEDICINE

## 2022-07-15 PROCEDURE — 3078F PR MOST RECENT DIASTOLIC BLOOD PRESSURE < 80 MM HG: ICD-10-PCS | Mod: CPTII,S$GLB,, | Performed by: FAMILY MEDICINE

## 2022-07-15 PROCEDURE — 1160F RVW MEDS BY RX/DR IN RCRD: CPT | Mod: CPTII,S$GLB,, | Performed by: FAMILY MEDICINE

## 2022-07-15 PROCEDURE — 3074F SYST BP LT 130 MM HG: CPT | Mod: CPTII,S$GLB,, | Performed by: FAMILY MEDICINE

## 2022-07-15 PROCEDURE — 93005 ELECTROCARDIOGRAM TRACING: CPT | Mod: S$GLB,,, | Performed by: FAMILY MEDICINE

## 2022-07-15 PROCEDURE — 3008F BODY MASS INDEX DOCD: CPT | Mod: CPTII,S$GLB,, | Performed by: FAMILY MEDICINE

## 2022-07-15 PROCEDURE — 3061F PR NEG MICROALBUMINURIA RESULT DOCUMENTED/REVIEW: ICD-10-PCS | Mod: CPTII,S$GLB,, | Performed by: FAMILY MEDICINE

## 2022-07-15 PROCEDURE — 93005 EKG 12-LEAD: ICD-10-PCS | Mod: S$GLB,,, | Performed by: FAMILY MEDICINE

## 2022-07-15 PROCEDURE — 93010 EKG 12-LEAD: ICD-10-PCS | Mod: S$GLB,,, | Performed by: SPECIALIST

## 2022-07-15 RX ORDER — BALSALAZIDE DISODIUM 750 MG/1
750 CAPSULE ORAL 3 TIMES DAILY
Status: ON HOLD | COMMUNITY
End: 2023-04-25 | Stop reason: HOSPADM

## 2022-07-16 NOTE — PROGRESS NOTES
Subjective:       Patient ID: Jacoby Jean-Baptiste is a 47 y.o. male.    Chief Complaint: Palpitations    Some palpitations last 3 or 4 weeks.  No history of recent issues.  But did have a history of SVT in the past.  This is not been fast just the skips.  No near syncope.  No extra caffeine intake.  Smoking 1/2 pack per day.  Mostly feels these at rest.    Physical examination vital signs noted.  Overweight male no acute distress.  Chest clear.  Heart regular rate rhythm.  Extremities without edema.    EKG shows normal sinus rhythm no acute changes.      Objective:        Assessment:       1. Palpitations        Plan:       Palpitations  -     IN OFFICE EKG 12-LEAD (to Muse)  -     Holter monitor - 24 hour; Future  -     TSH; Future; Expected date: 07/15/2022  -     T4, Free; Future; Expected date: 07/15/2022  -     Magnesium; Future; Expected date: 07/15/2022  -     Comprehensive Metabolic Panel; Future; Expected date: 07/15/2022      CMP free T4 TSH ordered.  Magnesium level.  Holter monitor.  Smoking cessation.

## 2022-07-19 ENCOUNTER — LAB VISIT (OUTPATIENT)
Dept: LAB | Facility: HOSPITAL | Age: 47
End: 2022-07-19
Attending: FAMILY MEDICINE
Payer: COMMERCIAL

## 2022-07-19 DIAGNOSIS — R00.2 PALPITATIONS: ICD-10-CM

## 2022-07-19 LAB
ALBUMIN SERPL BCP-MCNC: 3.9 G/DL (ref 3.5–5.2)
ALP SERPL-CCNC: 55 U/L (ref 55–135)
ALT SERPL W/O P-5'-P-CCNC: 12 U/L (ref 10–44)
ANION GAP SERPL CALC-SCNC: 8 MMOL/L (ref 8–16)
AST SERPL-CCNC: 16 U/L (ref 10–40)
BILIRUB SERPL-MCNC: 0.5 MG/DL (ref 0.1–1)
BUN SERPL-MCNC: 13 MG/DL (ref 6–20)
CALCIUM SERPL-MCNC: 9.2 MG/DL (ref 8.7–10.5)
CHLORIDE SERPL-SCNC: 101 MMOL/L (ref 95–110)
CO2 SERPL-SCNC: 28 MMOL/L (ref 23–29)
CREAT SERPL-MCNC: 0.8 MG/DL (ref 0.5–1.4)
EST. GFR  (AFRICAN AMERICAN): >60 ML/MIN/1.73 M^2
EST. GFR  (NON AFRICAN AMERICAN): >60 ML/MIN/1.73 M^2
GLUCOSE SERPL-MCNC: 95 MG/DL (ref 70–110)
MAGNESIUM SERPL-MCNC: 1.7 MG/DL (ref 1.6–2.6)
POTASSIUM SERPL-SCNC: 4.2 MMOL/L (ref 3.5–5.1)
PROT SERPL-MCNC: 7.9 G/DL (ref 6–8.4)
SODIUM SERPL-SCNC: 137 MMOL/L (ref 136–145)
T4 FREE SERPL-MCNC: 0.84 NG/DL (ref 0.71–1.51)
TSH SERPL DL<=0.005 MIU/L-ACNC: 1.1 UIU/ML (ref 0.34–5.6)

## 2022-07-19 PROCEDURE — 84439 ASSAY OF FREE THYROXINE: CPT | Performed by: FAMILY MEDICINE

## 2022-07-19 PROCEDURE — 84443 ASSAY THYROID STIM HORMONE: CPT | Performed by: FAMILY MEDICINE

## 2022-07-19 PROCEDURE — 80053 COMPREHEN METABOLIC PANEL: CPT | Performed by: FAMILY MEDICINE

## 2022-07-19 PROCEDURE — 83735 ASSAY OF MAGNESIUM: CPT | Performed by: FAMILY MEDICINE

## 2022-07-19 PROCEDURE — 36415 COLL VENOUS BLD VENIPUNCTURE: CPT | Performed by: FAMILY MEDICINE

## 2022-07-22 ENCOUNTER — TELEPHONE (OUTPATIENT)
Dept: CARDIOLOGY | Facility: HOSPITAL | Age: 47
End: 2022-07-22

## 2022-07-25 ENCOUNTER — CLINICAL SUPPORT (OUTPATIENT)
Dept: CARDIOLOGY | Facility: HOSPITAL | Age: 47
End: 2022-07-25
Attending: FAMILY MEDICINE
Payer: COMMERCIAL

## 2022-07-25 DIAGNOSIS — R00.2 PALPITATIONS: ICD-10-CM

## 2022-07-25 PROCEDURE — 93225 XTRNL ECG REC<48 HRS REC: CPT

## 2022-07-25 PROCEDURE — 93227 HOLTER MONITOR - 24 HOUR (CUPID ONLY): ICD-10-PCS | Mod: ,,, | Performed by: INTERNAL MEDICINE

## 2022-07-25 PROCEDURE — 93227 XTRNL ECG REC<48 HR R&I: CPT | Mod: ,,, | Performed by: INTERNAL MEDICINE

## 2022-08-01 ENCOUNTER — PATIENT MESSAGE (OUTPATIENT)
Dept: FAMILY MEDICINE | Facility: CLINIC | Age: 47
End: 2022-08-01
Payer: COMMERCIAL

## 2022-08-05 ENCOUNTER — TELEPHONE (OUTPATIENT)
Dept: PHARMACY | Facility: CLINIC | Age: 47
End: 2022-08-05
Payer: COMMERCIAL

## 2022-08-09 LAB
OHS CV EVENT MONITOR DAY: 0
OHS CV HOLTER LENGTH DECIMAL HOURS: 24
OHS CV HOLTER LENGTH HOURS: 24
OHS CV HOLTER LENGTH MINUTES: 0
OHS CV HOLTER SINUS AVERAGE HR: 81
OHS CV HOLTER SINUS MAX HR: 112
OHS CV HOLTER SINUS MIN HR: 59

## 2022-08-12 ENCOUNTER — PATIENT MESSAGE (OUTPATIENT)
Dept: FAMILY MEDICINE | Facility: CLINIC | Age: 47
End: 2022-08-12
Payer: COMMERCIAL

## 2022-08-12 ENCOUNTER — TELEPHONE (OUTPATIENT)
Dept: FAMILY MEDICINE | Facility: CLINIC | Age: 47
End: 2022-08-12
Payer: COMMERCIAL

## 2022-08-12 NOTE — TELEPHONE ENCOUNTER
----- Message from Ronni Lopez MA sent at 8/12/2022 10:37 AM CDT -----  Contact: GEOVANNA NIELSEN [[id number]]  Type: Needs Medical Advice    Who Called:GEOVANNA NIELSEN [[id number]]  Best Call Back Number: [phone]  Inquiry/Question: Please call GEOVANNA NIELSEN [[id number]] regarding test results   Thank you~       done

## 2022-09-21 ENCOUNTER — OFFICE VISIT (OUTPATIENT)
Dept: FAMILY MEDICINE | Facility: CLINIC | Age: 47
End: 2022-09-21
Payer: COMMERCIAL

## 2022-09-21 ENCOUNTER — PATIENT MESSAGE (OUTPATIENT)
Dept: FAMILY MEDICINE | Facility: CLINIC | Age: 47
End: 2022-09-21

## 2022-09-21 VITALS
WEIGHT: 297.88 LBS | TEMPERATURE: 98 F | BODY MASS INDEX: 49.63 KG/M2 | OXYGEN SATURATION: 97 % | DIASTOLIC BLOOD PRESSURE: 86 MMHG | HEIGHT: 65 IN | HEART RATE: 84 BPM | SYSTOLIC BLOOD PRESSURE: 130 MMHG

## 2022-09-21 DIAGNOSIS — F41.9 ANXIETY: ICD-10-CM

## 2022-09-21 DIAGNOSIS — Z72.0 TOBACCO USE: Primary | ICD-10-CM

## 2022-09-21 DIAGNOSIS — R00.2 PALPITATIONS: ICD-10-CM

## 2022-09-21 DIAGNOSIS — E11.9 TYPE 2 DIABETES MELLITUS WITHOUT COMPLICATION, WITHOUT LONG-TERM CURRENT USE OF INSULIN: ICD-10-CM

## 2022-09-21 DIAGNOSIS — G47.00 INSOMNIA, UNSPECIFIED TYPE: ICD-10-CM

## 2022-09-21 PROCEDURE — 1159F MED LIST DOCD IN RCRD: CPT | Mod: CPTII,S$GLB,, | Performed by: FAMILY MEDICINE

## 2022-09-21 PROCEDURE — 3075F PR MOST RECENT SYSTOLIC BLOOD PRESS GE 130-139MM HG: ICD-10-PCS | Mod: CPTII,S$GLB,, | Performed by: FAMILY MEDICINE

## 2022-09-21 PROCEDURE — 3008F PR BODY MASS INDEX (BMI) DOCUMENTED: ICD-10-PCS | Mod: CPTII,S$GLB,, | Performed by: FAMILY MEDICINE

## 2022-09-21 PROCEDURE — 3061F NEG MICROALBUMINURIA REV: CPT | Mod: CPTII,S$GLB,, | Performed by: FAMILY MEDICINE

## 2022-09-21 PROCEDURE — 3066F PR DOCUMENTATION OF TREATMENT FOR NEPHROPATHY: ICD-10-PCS | Mod: CPTII,S$GLB,, | Performed by: FAMILY MEDICINE

## 2022-09-21 PROCEDURE — 3079F DIAST BP 80-89 MM HG: CPT | Mod: CPTII,S$GLB,, | Performed by: FAMILY MEDICINE

## 2022-09-21 PROCEDURE — 1160F RVW MEDS BY RX/DR IN RCRD: CPT | Mod: CPTII,S$GLB,, | Performed by: FAMILY MEDICINE

## 2022-09-21 PROCEDURE — 99214 OFFICE O/P EST MOD 30 MIN: CPT | Mod: S$GLB,,, | Performed by: FAMILY MEDICINE

## 2022-09-21 PROCEDURE — 3044F PR MOST RECENT HEMOGLOBIN A1C LEVEL <7.0%: ICD-10-PCS | Mod: CPTII,S$GLB,, | Performed by: FAMILY MEDICINE

## 2022-09-21 PROCEDURE — 3044F HG A1C LEVEL LT 7.0%: CPT | Mod: CPTII,S$GLB,, | Performed by: FAMILY MEDICINE

## 2022-09-21 PROCEDURE — 3061F PR NEG MICROALBUMINURIA RESULT DOCUMENTED/REVIEW: ICD-10-PCS | Mod: CPTII,S$GLB,, | Performed by: FAMILY MEDICINE

## 2022-09-21 PROCEDURE — 3079F PR MOST RECENT DIASTOLIC BLOOD PRESSURE 80-89 MM HG: ICD-10-PCS | Mod: CPTII,S$GLB,, | Performed by: FAMILY MEDICINE

## 2022-09-21 PROCEDURE — 99214 PR OFFICE/OUTPT VISIT, EST, LEVL IV, 30-39 MIN: ICD-10-PCS | Mod: S$GLB,,, | Performed by: FAMILY MEDICINE

## 2022-09-21 PROCEDURE — 3075F SYST BP GE 130 - 139MM HG: CPT | Mod: CPTII,S$GLB,, | Performed by: FAMILY MEDICINE

## 2022-09-21 PROCEDURE — 1160F PR REVIEW ALL MEDS BY PRESCRIBER/CLIN PHARMACIST DOCUMENTED: ICD-10-PCS | Mod: CPTII,S$GLB,, | Performed by: FAMILY MEDICINE

## 2022-09-21 PROCEDURE — 3066F NEPHROPATHY DOC TX: CPT | Mod: CPTII,S$GLB,, | Performed by: FAMILY MEDICINE

## 2022-09-21 PROCEDURE — 3008F BODY MASS INDEX DOCD: CPT | Mod: CPTII,S$GLB,, | Performed by: FAMILY MEDICINE

## 2022-09-21 PROCEDURE — 1159F PR MEDICATION LIST DOCUMENTED IN MEDICAL RECORD: ICD-10-PCS | Mod: CPTII,S$GLB,, | Performed by: FAMILY MEDICINE

## 2022-09-21 RX ORDER — LANOLIN ALCOHOL/MO/W.PET/CERES
400 CREAM (GRAM) TOPICAL DAILY
Status: ON HOLD | COMMUNITY
End: 2023-04-25 | Stop reason: HOSPADM

## 2022-09-21 RX ORDER — ZOLPIDEM TARTRATE 10 MG/1
10 TABLET ORAL NIGHTLY PRN
Qty: 30 TABLET | Refills: 2 | Status: SHIPPED | OUTPATIENT
Start: 2022-09-21 | End: 2022-12-05 | Stop reason: SDUPTHER

## 2022-09-21 RX ORDER — SERTRALINE HYDROCHLORIDE 50 MG/1
50 TABLET, FILM COATED ORAL DAILY
Qty: 30 TABLET | Refills: 2 | Status: SHIPPED | OUTPATIENT
Start: 2022-09-21 | End: 2022-11-08 | Stop reason: SDUPTHER
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
## 2022-10-17 RX ORDER — SEMAGLUTIDE 1.34 MG/ML
1 INJECTION, SOLUTION SUBCUTANEOUS
Qty: 1 PEN | Refills: 1 | Status: SHIPPED | OUTPATIENT
Start: 2022-10-17 | End: 2022-12-09 | Stop reason: SDUPTHER

## 2022-10-17 NOTE — TELEPHONE ENCOUNTER
Care Due:                  Date            Visit Type   Department     Provider  --------------------------------------------------------------------------------                                EP                               PRIMARY      [facility]  Last Visit: 09-      CARE ([facility])   [facility]  Next Visit: None Scheduled  None         None Found                                                            Last  Test          Frequency    Reason                     Performed    Due Date  --------------------------------------------------------------------------------    HBA1C.......  6 months...  metFORMIN, semaglutide...  06- 12-    Health [facility] Embedded Care Gaps. Reference number: [id number]. 10/17/2022   11:38:10 AM CDT

## 2022-11-08 ENCOUNTER — OFFICE VISIT (OUTPATIENT)
Dept: FAMILY MEDICINE | Facility: CLINIC | Age: 47
End: 2022-11-08
Payer: COMMERCIAL

## 2022-11-08 VITALS
BODY MASS INDEX: 47.42 KG/M2 | HEIGHT: 65 IN | WEIGHT: 284.63 LBS | SYSTOLIC BLOOD PRESSURE: 128 MMHG | OXYGEN SATURATION: 94 % | DIASTOLIC BLOOD PRESSURE: 74 MMHG | HEART RATE: 86 BPM | TEMPERATURE: 98 F

## 2022-11-08 DIAGNOSIS — G47.00 INSOMNIA, UNSPECIFIED TYPE: ICD-10-CM

## 2022-11-08 DIAGNOSIS — E78.5 HYPERLIPIDEMIA, UNSPECIFIED HYPERLIPIDEMIA TYPE: ICD-10-CM

## 2022-11-08 DIAGNOSIS — Z72.0 TOBACCO USE: Primary | ICD-10-CM

## 2022-11-08 DIAGNOSIS — E11.9 TYPE 2 DIABETES MELLITUS WITHOUT COMPLICATION, WITHOUT LONG-TERM CURRENT USE OF INSULIN: ICD-10-CM

## 2022-11-08 DIAGNOSIS — F41.9 ANXIETY: ICD-10-CM

## 2022-11-08 DIAGNOSIS — R00.2 PALPITATIONS: ICD-10-CM

## 2022-11-08 DIAGNOSIS — E66.01 MORBID OBESITY: ICD-10-CM

## 2022-11-08 PROCEDURE — 3066F NEPHROPATHY DOC TX: CPT | Mod: CPTII,S$GLB,, | Performed by: FAMILY MEDICINE

## 2022-11-08 PROCEDURE — 3078F PR MOST RECENT DIASTOLIC BLOOD PRESSURE < 80 MM HG: ICD-10-PCS | Mod: CPTII,S$GLB,, | Performed by: FAMILY MEDICINE

## 2022-11-08 PROCEDURE — 99213 OFFICE O/P EST LOW 20 MIN: CPT | Mod: S$GLB,,, | Performed by: FAMILY MEDICINE

## 2022-11-08 PROCEDURE — 3074F PR MOST RECENT SYSTOLIC BLOOD PRESSURE < 130 MM HG: ICD-10-PCS | Mod: CPTII,S$GLB,, | Performed by: FAMILY MEDICINE

## 2022-11-08 PROCEDURE — 99213 PR OFFICE/OUTPT VISIT, EST, LEVL III, 20-29 MIN: ICD-10-PCS | Mod: S$GLB,,, | Performed by: FAMILY MEDICINE

## 2022-11-08 PROCEDURE — 3061F PR NEG MICROALBUMINURIA RESULT DOCUMENTED/REVIEW: ICD-10-PCS | Mod: CPTII,S$GLB,, | Performed by: FAMILY MEDICINE

## 2022-11-08 PROCEDURE — 3044F HG A1C LEVEL LT 7.0%: CPT | Mod: CPTII,S$GLB,, | Performed by: FAMILY MEDICINE

## 2022-11-08 PROCEDURE — 3061F NEG MICROALBUMINURIA REV: CPT | Mod: CPTII,S$GLB,, | Performed by: FAMILY MEDICINE

## 2022-11-08 PROCEDURE — 3008F BODY MASS INDEX DOCD: CPT | Mod: CPTII,S$GLB,, | Performed by: FAMILY MEDICINE

## 2022-11-08 PROCEDURE — 3008F PR BODY MASS INDEX (BMI) DOCUMENTED: ICD-10-PCS | Mod: CPTII,S$GLB,, | Performed by: FAMILY MEDICINE

## 2022-11-08 PROCEDURE — 3074F SYST BP LT 130 MM HG: CPT | Mod: CPTII,S$GLB,, | Performed by: FAMILY MEDICINE

## 2022-11-08 PROCEDURE — 3066F PR DOCUMENTATION OF TREATMENT FOR NEPHROPATHY: ICD-10-PCS | Mod: CPTII,S$GLB,, | Performed by: FAMILY MEDICINE

## 2022-11-08 PROCEDURE — 3078F DIAST BP <80 MM HG: CPT | Mod: CPTII,S$GLB,, | Performed by: FAMILY MEDICINE

## 2022-11-08 PROCEDURE — 1160F PR REVIEW ALL MEDS BY PRESCRIBER/CLIN PHARMACIST DOCUMENTED: ICD-10-PCS | Mod: CPTII,S$GLB,, | Performed by: FAMILY MEDICINE

## 2022-11-08 PROCEDURE — 1159F MED LIST DOCD IN RCRD: CPT | Mod: CPTII,S$GLB,, | Performed by: FAMILY MEDICINE

## 2022-11-08 PROCEDURE — 3044F PR MOST RECENT HEMOGLOBIN A1C LEVEL <7.0%: ICD-10-PCS | Mod: CPTII,S$GLB,, | Performed by: FAMILY MEDICINE

## 2022-11-08 PROCEDURE — 1160F RVW MEDS BY RX/DR IN RCRD: CPT | Mod: CPTII,S$GLB,, | Performed by: FAMILY MEDICINE

## 2022-11-08 PROCEDURE — 1159F PR MEDICATION LIST DOCUMENTED IN MEDICAL RECORD: ICD-10-PCS | Mod: CPTII,S$GLB,, | Performed by: FAMILY MEDICINE

## 2022-11-08 RX ORDER — SERTRALINE HYDROCHLORIDE 50 MG/1
50 TABLET, FILM COATED ORAL DAILY
Qty: 90 TABLET | Refills: 1 | Status: SHIPPED | OUTPATIENT
Start: 2022-11-08 | End: 2023-06-05 | Stop reason: SDUPTHER

## 2022-11-08 NOTE — PROGRESS NOTES
Subjective:       Patient ID: Jacoby Jean-Baptiste is a 47 y.o. male.    Chief Complaint: Follow-up    Patient presents to clinic for follow up. Palpitations better. Taking Magnesium supplement. Cardiovascular ok. Denies chest pain or palpitations. Anxiety controlled on Zoloft. He would like to stay at this dosage. Work is busy, but he is feeling ok. Insomnia. Ambien not helping much. Having trouble staying asleep. Sleeping 3-5 hours a night. Tobacco use, he is smoking less. Morbid obesity. Weight down 13 lbs. Due for Covid booster and influenza vaccination. Declining these at this time.     Review of Systems   Respiratory: Negative.     Cardiovascular: Negative.  Negative for chest pain and palpitations.   Psychiatric/Behavioral: Negative.         Objective:      Physical Exam  Constitutional:       Appearance: Normal appearance. He is obese.   Neck:      Vascular: No carotid bruit.   Cardiovascular:      Rate and Rhythm: Normal rate and regular rhythm.      Pulses: Normal pulses.      Heart sounds: Normal heart sounds.   Pulmonary:      Effort: Pulmonary effort is normal.      Breath sounds: Normal breath sounds.   Lymphadenopathy:      Cervical: No cervical adenopathy.   Skin:     General: Skin is warm and dry.   Neurological:      Mental Status: He is alert.   Psychiatric:         Mood and Affect: Mood normal.         Behavior: Behavior normal.       Assessment/Plan:     Tobacco use    Palpitations  -     CBC Auto Differential; Future; Expected date: 11/08/2022  -     Comprehensive Metabolic Panel; Future; Expected date: 11/08/2022  -     T4, Free; Future; Expected date: 11/08/2022  -     TSH; Future; Expected date: 11/08/2022    Insomnia, unspecified type  -     T4, Free; Future; Expected date: 11/08/2022  -     TSH; Future; Expected date: 11/08/2022    Anxiety    Hyperlipidemia, unspecified hyperlipidemia type  -     Lipid Panel; Future; Expected date: 11/08/2022    Type 2 diabetes mellitus without complication,  without long-term current use of insulin  -     Hemoglobin A1C; Future; Expected date: 11/08/2022    Morbid obesity    Other orders  -     sertraline (ZOLOFT) 50 MG tablet; Take 1 tablet (50 mg total) by mouth once daily.  Dispense: 90 tablet; Refill: 1     Continue to decrease cigarette intake.  Stop completely.  Monitor the heart rate.  Continue the Zoloft at 50 mg daily.  Can increase if needed.  Declines flu shot.  Follow-up in 2 months with CBC CMP free T4 TSH.  And lipids.

## 2022-12-05 ENCOUNTER — PATIENT MESSAGE (OUTPATIENT)
Dept: FAMILY MEDICINE | Facility: CLINIC | Age: 47
End: 2022-12-05
Payer: COMMERCIAL

## 2022-12-09 NOTE — TELEPHONE ENCOUNTER
No new care gaps identified.  Health [facility] Embedded Care Gaps. Reference number: [id number]. 12/09/2022   4:15:12 PM CST

## 2022-12-10 RX ORDER — SEMAGLUTIDE 1.34 MG/ML
1 INJECTION, SOLUTION SUBCUTANEOUS
Qty: 1 PEN | Refills: 1 | Status: SHIPPED | OUTPATIENT
Start: 2022-12-10 | End: 2023-01-30 | Stop reason: SDUPTHER

## 2022-12-10 NOTE — TELEPHONE ENCOUNTER
Refill Routing Note   Medication(s) are not appropriate for processing by [facility] for the following reason(s):      - Required laboratory values are outdated    [facility] action(s):  Defer          Medication reconciliation completed: No     Appointments  past 12m or future 3m with PCP    Date Provider   Last Visit   11/8/2022 Hunter Aguilar III, MD   Next Visit   Visit date not found Hunter Aguilar III, MD   ED visits in past 90 days: 0        Note composed:9:56 AM 12/10/2022

## 2023-01-04 ENCOUNTER — LAB VISIT (OUTPATIENT)
Dept: LAB | Facility: HOSPITAL | Age: 48
End: 2023-01-04
Attending: FAMILY MEDICINE
Payer: COMMERCIAL

## 2023-01-04 DIAGNOSIS — R00.2 PALPITATIONS: ICD-10-CM

## 2023-01-04 DIAGNOSIS — E11.9 TYPE 2 DIABETES MELLITUS WITHOUT COMPLICATION, WITHOUT LONG-TERM CURRENT USE OF INSULIN: ICD-10-CM

## 2023-01-04 DIAGNOSIS — E78.5 HYPERLIPIDEMIA, UNSPECIFIED HYPERLIPIDEMIA TYPE: ICD-10-CM

## 2023-01-04 DIAGNOSIS — G47.00 INSOMNIA, UNSPECIFIED TYPE: ICD-10-CM

## 2023-01-04 LAB
ALBUMIN SERPL BCP-MCNC: 3.2 G/DL (ref 3.5–5.2)
ALP SERPL-CCNC: 46 U/L (ref 55–135)
ALT SERPL W/O P-5'-P-CCNC: 15 U/L (ref 10–44)
ANION GAP SERPL CALC-SCNC: 9 MMOL/L (ref 8–16)
AST SERPL-CCNC: 18 U/L (ref 10–40)
BASOPHILS # BLD AUTO: 0.11 K/UL (ref 0–0.2)
BASOPHILS NFR BLD: 0.5 % (ref 0–1.9)
BILIRUB SERPL-MCNC: 0.4 MG/DL (ref 0.1–1)
BUN SERPL-MCNC: 19 MG/DL (ref 6–20)
CALCIUM SERPL-MCNC: 9 MG/DL (ref 8.7–10.5)
CHLORIDE SERPL-SCNC: 97 MMOL/L (ref 95–110)
CHOLEST SERPL-MCNC: 103 MG/DL (ref 120–199)
CHOLEST/HDLC SERPL: 2.5 {RATIO} (ref 2–5)
CO2 SERPL-SCNC: 30 MMOL/L (ref 23–29)
CREAT SERPL-MCNC: 0.8 MG/DL (ref 0.5–1.4)
DIFFERENTIAL METHOD: ABNORMAL
EOSINOPHIL # BLD AUTO: 0.8 K/UL (ref 0–0.5)
EOSINOPHIL NFR BLD: 4.1 % (ref 0–8)
ERYTHROCYTE [DISTWIDTH] IN BLOOD BY AUTOMATED COUNT: 16.7 % (ref 11.5–14.5)
EST. GFR  (NO RACE VARIABLE): >60 ML/MIN/1.73 M^2
ESTIMATED AVG GLUCOSE: 120 MG/DL (ref 68–131)
GLUCOSE SERPL-MCNC: 94 MG/DL (ref 70–110)
HBA1C MFR BLD: 5.8 % (ref 4.5–6.2)
HCT VFR BLD AUTO: 37.8 % (ref 40–54)
HDLC SERPL-MCNC: 41 MG/DL (ref 40–75)
HDLC SERPL: 39.8 % (ref 20–50)
HGB BLD-MCNC: 11.8 G/DL (ref 14–18)
IMM GRANULOCYTES # BLD AUTO: 0.22 K/UL (ref 0–0.04)
IMM GRANULOCYTES NFR BLD AUTO: 1.1 % (ref 0–0.5)
LDLC SERPL CALC-MCNC: 44.8 MG/DL (ref 63–159)
LYMPHOCYTES # BLD AUTO: 3 K/UL (ref 1–4.8)
LYMPHOCYTES NFR BLD: 14.4 % (ref 18–48)
MCH RBC QN AUTO: 27.2 PG (ref 27–31)
MCHC RBC AUTO-ENTMCNC: 31.2 G/DL (ref 32–36)
MCV RBC AUTO: 87 FL (ref 82–98)
MONOCYTES # BLD AUTO: 1.4 K/UL (ref 0.3–1)
MONOCYTES NFR BLD: 6.6 % (ref 4–15)
NEUTROPHILS # BLD AUTO: 15.1 K/UL (ref 1.8–7.7)
NEUTROPHILS NFR BLD: 73.3 % (ref 38–73)
NONHDLC SERPL-MCNC: 62 MG/DL
NRBC BLD-RTO: 0 /100 WBC
PLATELET # BLD AUTO: 461 K/UL (ref 150–450)
PMV BLD AUTO: 9.4 FL (ref 9.2–12.9)
POTASSIUM SERPL-SCNC: 4.9 MMOL/L (ref 3.5–5.1)
PROT SERPL-MCNC: 6.9 G/DL (ref 6–8.4)
RBC # BLD AUTO: 4.34 M/UL (ref 4.6–6.2)
SODIUM SERPL-SCNC: 136 MMOL/L (ref 136–145)
T4 FREE SERPL-MCNC: 1.02 NG/DL (ref 0.71–1.51)
TRIGL SERPL-MCNC: 86 MG/DL (ref 30–150)
TSH SERPL DL<=0.005 MIU/L-ACNC: 3.01 UIU/ML (ref 0.34–5.6)
WBC # BLD AUTO: 20.61 K/UL (ref 3.9–12.7)

## 2023-01-04 PROCEDURE — 85025 COMPLETE CBC W/AUTO DIFF WBC: CPT | Performed by: FAMILY MEDICINE

## 2023-01-04 PROCEDURE — 83036 HEMOGLOBIN GLYCOSYLATED A1C: CPT | Performed by: FAMILY MEDICINE

## 2023-01-04 PROCEDURE — 80061 LIPID PANEL: CPT | Performed by: FAMILY MEDICINE

## 2023-01-04 PROCEDURE — 84439 ASSAY OF FREE THYROXINE: CPT | Performed by: FAMILY MEDICINE

## 2023-01-04 PROCEDURE — 80053 COMPREHEN METABOLIC PANEL: CPT | Performed by: FAMILY MEDICINE

## 2023-01-04 PROCEDURE — 84443 ASSAY THYROID STIM HORMONE: CPT | Performed by: FAMILY MEDICINE

## 2023-01-04 PROCEDURE — 36415 COLL VENOUS BLD VENIPUNCTURE: CPT | Performed by: FAMILY MEDICINE

## 2023-01-06 ENCOUNTER — LAB VISIT (OUTPATIENT)
Dept: LAB | Facility: HOSPITAL | Age: 48
End: 2023-01-06
Attending: FAMILY MEDICINE
Payer: COMMERCIAL

## 2023-01-06 ENCOUNTER — OFFICE VISIT (OUTPATIENT)
Dept: FAMILY MEDICINE | Facility: CLINIC | Age: 48
End: 2023-01-06
Payer: COMMERCIAL

## 2023-01-06 ENCOUNTER — TELEPHONE (OUTPATIENT)
Dept: FAMILY MEDICINE | Facility: CLINIC | Age: 48
End: 2023-01-06

## 2023-01-06 VITALS
HEIGHT: 65 IN | DIASTOLIC BLOOD PRESSURE: 78 MMHG | TEMPERATURE: 98 F | BODY MASS INDEX: 46.15 KG/M2 | SYSTOLIC BLOOD PRESSURE: 104 MMHG | HEART RATE: 82 BPM | OXYGEN SATURATION: 99 % | WEIGHT: 277 LBS

## 2023-01-06 DIAGNOSIS — E11.9 TYPE 2 DIABETES MELLITUS WITHOUT COMPLICATION, WITHOUT LONG-TERM CURRENT USE OF INSULIN: ICD-10-CM

## 2023-01-06 DIAGNOSIS — R50.9 FEVER, UNSPECIFIED FEVER CAUSE: ICD-10-CM

## 2023-01-06 DIAGNOSIS — K51.919 ULCERATIVE COLITIS WITH COMPLICATION, UNSPECIFIED LOCATION: ICD-10-CM

## 2023-01-06 DIAGNOSIS — E66.01 MORBID OBESITY: ICD-10-CM

## 2023-01-06 DIAGNOSIS — R50.9 FEVER, UNSPECIFIED FEVER CAUSE: Primary | ICD-10-CM

## 2023-01-06 LAB
ALBUMIN SERPL BCP-MCNC: 3.4 G/DL (ref 3.5–5.2)
ALP SERPL-CCNC: 52 U/L (ref 55–135)
ALT SERPL W/O P-5'-P-CCNC: 15 U/L (ref 10–44)
ANION GAP SERPL CALC-SCNC: 8 MMOL/L (ref 8–16)
AST SERPL-CCNC: 14 U/L (ref 10–40)
BASOPHILS # BLD AUTO: 0.08 K/UL (ref 0–0.2)
BASOPHILS NFR BLD: 0.4 % (ref 0–1.9)
BILIRUB SERPL-MCNC: 0.4 MG/DL (ref 0.1–1)
BILIRUBIN, UA POC OHS: NEGATIVE
BLOOD, UA POC OHS: NEGATIVE
BUN SERPL-MCNC: 14 MG/DL (ref 6–20)
CALCIUM SERPL-MCNC: 9 MG/DL (ref 8.7–10.5)
CHLORIDE SERPL-SCNC: 97 MMOL/L (ref 95–110)
CLARITY, UA POC OHS: CLEAR
CO2 SERPL-SCNC: 27 MMOL/L (ref 23–29)
COLOR, UA POC OHS: YELLOW
CREAT SERPL-MCNC: 0.8 MG/DL (ref 0.5–1.4)
CRP SERPL-MCNC: 11.02 MG/DL
DIFFERENTIAL METHOD: ABNORMAL
EOSINOPHIL # BLD AUTO: 0.8 K/UL (ref 0–0.5)
EOSINOPHIL NFR BLD: 4 % (ref 0–8)
ERYTHROCYTE [DISTWIDTH] IN BLOOD BY AUTOMATED COUNT: 16.9 % (ref 11.5–14.5)
ERYTHROCYTE [SEDIMENTATION RATE] IN BLOOD BY WESTERGREN METHOD: 28 MM/HR (ref 0–10)
EST. GFR  (NO RACE VARIABLE): >60 ML/MIN/1.73 M^2
GLUCOSE SERPL-MCNC: 94 MG/DL (ref 70–110)
GLUCOSE, UA POC OHS: NEGATIVE
HCT VFR BLD AUTO: 38.3 % (ref 40–54)
HGB BLD-MCNC: 11.8 G/DL (ref 14–18)
IMM GRANULOCYTES # BLD AUTO: 0.14 K/UL (ref 0–0.04)
IMM GRANULOCYTES NFR BLD AUTO: 0.7 % (ref 0–0.5)
KETONES, UA POC OHS: NEGATIVE
LEUKOCYTES, UA POC OHS: NEGATIVE
LYMPHOCYTES # BLD AUTO: 2.2 K/UL (ref 1–4.8)
LYMPHOCYTES NFR BLD: 11.4 % (ref 18–48)
MCH RBC QN AUTO: 26.9 PG (ref 27–31)
MCHC RBC AUTO-ENTMCNC: 30.8 G/DL (ref 32–36)
MCV RBC AUTO: 87 FL (ref 82–98)
MONOCYTES # BLD AUTO: 1.2 K/UL (ref 0.3–1)
MONOCYTES NFR BLD: 6.4 % (ref 4–15)
NEUTROPHILS # BLD AUTO: 14.6 K/UL (ref 1.8–7.7)
NEUTROPHILS NFR BLD: 77.1 % (ref 38–73)
NITRITE, UA POC OHS: NEGATIVE
NRBC BLD-RTO: 0 /100 WBC
PH, UA POC OHS: 5.5
PLATELET # BLD AUTO: 419 K/UL (ref 150–450)
PMV BLD AUTO: 9.3 FL (ref 9.2–12.9)
POTASSIUM SERPL-SCNC: 4.7 MMOL/L (ref 3.5–5.1)
PROT SERPL-MCNC: 7.5 G/DL (ref 6–8.4)
PROTEIN, UA POC OHS: NEGATIVE
RBC # BLD AUTO: 4.38 M/UL (ref 4.6–6.2)
SODIUM SERPL-SCNC: 132 MMOL/L (ref 136–145)
SPECIFIC GRAVITY, UA POC OHS: >=1.03
UROBILINOGEN, UA POC OHS: 0.2
WBC # BLD AUTO: 18.9 K/UL (ref 3.9–12.7)

## 2023-01-06 PROCEDURE — 3044F PR MOST RECENT HEMOGLOBIN A1C LEVEL <7.0%: ICD-10-PCS | Mod: CPTII,S$GLB,, | Performed by: FAMILY MEDICINE

## 2023-01-06 PROCEDURE — 85025 COMPLETE CBC W/AUTO DIFF WBC: CPT | Performed by: FAMILY MEDICINE

## 2023-01-06 PROCEDURE — 3074F SYST BP LT 130 MM HG: CPT | Mod: CPTII,S$GLB,, | Performed by: FAMILY MEDICINE

## 2023-01-06 PROCEDURE — 3008F PR BODY MASS INDEX (BMI) DOCUMENTED: ICD-10-PCS | Mod: CPTII,S$GLB,, | Performed by: FAMILY MEDICINE

## 2023-01-06 PROCEDURE — 3008F BODY MASS INDEX DOCD: CPT | Mod: CPTII,S$GLB,, | Performed by: FAMILY MEDICINE

## 2023-01-06 PROCEDURE — 81003 POCT URINALYSIS(INSTRUMENT): ICD-10-PCS | Mod: QW,S$GLB,, | Performed by: FAMILY MEDICINE

## 2023-01-06 PROCEDURE — 1159F PR MEDICATION LIST DOCUMENTED IN MEDICAL RECORD: ICD-10-PCS | Mod: CPTII,S$GLB,, | Performed by: FAMILY MEDICINE

## 2023-01-06 PROCEDURE — 3078F DIAST BP <80 MM HG: CPT | Mod: CPTII,S$GLB,, | Performed by: FAMILY MEDICINE

## 2023-01-06 PROCEDURE — 80053 COMPREHEN METABOLIC PANEL: CPT | Performed by: FAMILY MEDICINE

## 2023-01-06 PROCEDURE — 1160F PR REVIEW ALL MEDS BY PRESCRIBER/CLIN PHARMACIST DOCUMENTED: ICD-10-PCS | Mod: CPTII,S$GLB,, | Performed by: FAMILY MEDICINE

## 2023-01-06 PROCEDURE — 3072F PR LOW RISK FOR RETINOPATHY: ICD-10-PCS | Mod: CPTII,S$GLB,, | Performed by: FAMILY MEDICINE

## 2023-01-06 PROCEDURE — 85651 RBC SED RATE NONAUTOMATED: CPT | Performed by: FAMILY MEDICINE

## 2023-01-06 PROCEDURE — 87040 BLOOD CULTURE FOR BACTERIA: CPT | Performed by: FAMILY MEDICINE

## 2023-01-06 PROCEDURE — 3044F HG A1C LEVEL LT 7.0%: CPT | Mod: CPTII,S$GLB,, | Performed by: FAMILY MEDICINE

## 2023-01-06 PROCEDURE — 3078F PR MOST RECENT DIASTOLIC BLOOD PRESSURE < 80 MM HG: ICD-10-PCS | Mod: CPTII,S$GLB,, | Performed by: FAMILY MEDICINE

## 2023-01-06 PROCEDURE — 3072F LOW RISK FOR RETINOPATHY: CPT | Mod: CPTII,S$GLB,, | Performed by: FAMILY MEDICINE

## 2023-01-06 PROCEDURE — 86140 C-REACTIVE PROTEIN: CPT | Performed by: FAMILY MEDICINE

## 2023-01-06 PROCEDURE — 1160F RVW MEDS BY RX/DR IN RCRD: CPT | Mod: CPTII,S$GLB,, | Performed by: FAMILY MEDICINE

## 2023-01-06 PROCEDURE — 81003 URINALYSIS AUTO W/O SCOPE: CPT | Mod: QW,S$GLB,, | Performed by: FAMILY MEDICINE

## 2023-01-06 PROCEDURE — 99214 OFFICE O/P EST MOD 30 MIN: CPT | Mod: S$GLB,,, | Performed by: FAMILY MEDICINE

## 2023-01-06 PROCEDURE — 1159F MED LIST DOCD IN RCRD: CPT | Mod: CPTII,S$GLB,, | Performed by: FAMILY MEDICINE

## 2023-01-06 PROCEDURE — 36415 COLL VENOUS BLD VENIPUNCTURE: CPT | Performed by: FAMILY MEDICINE

## 2023-01-06 PROCEDURE — 99214 PR OFFICE/OUTPT VISIT, EST, LEVL IV, 30-39 MIN: ICD-10-PCS | Mod: S$GLB,,, | Performed by: FAMILY MEDICINE

## 2023-01-06 PROCEDURE — 3074F PR MOST RECENT SYSTOLIC BLOOD PRESSURE < 130 MM HG: ICD-10-PCS | Mod: CPTII,S$GLB,, | Performed by: FAMILY MEDICINE

## 2023-01-06 RX ORDER — LEVOFLOXACIN 500 MG/1
500 TABLET, FILM COATED ORAL DAILY
Qty: 10 TABLET | Refills: 0 | Status: SHIPPED | OUTPATIENT
Start: 2023-01-06 | End: 2023-02-11

## 2023-01-06 RX ORDER — METRONIDAZOLE 250 MG/1
250 TABLET ORAL EVERY 8 HOURS
Qty: 30 TABLET | Refills: 0 | Status: SHIPPED | OUTPATIENT
Start: 2023-01-06 | End: 2023-02-11

## 2023-01-06 RX ORDER — FAMOTIDINE 40 MG/1
40 TABLET, FILM COATED ORAL NIGHTLY
COMMUNITY
Start: 2022-12-28 | End: 2023-10-30

## 2023-01-06 NOTE — PATIENT INSTRUCTIONS
Gastro [place]  [address] [facility] [address]  Phone: [phone]  Fax: [phone]    Labs downstairs     Urine sent out for culture

## 2023-01-06 NOTE — PROGRESS NOTES
Subjective:       Patient ID: Jacoby Jean-Baptiste is a 48 y.o. male.    Chief Complaint: Follow-up    Patient presents to clinic with complaints of nausea and body aches for 2 weeks. Vomiting has resolved. Denies fever or chills of significance. Negative at  for Covid and Influenza. Some diarrhea. Gas pains in abdomen off and on. Colonoscopy in May. Concerns of ulcerative colitis. On Colazal. Colon polyps. Due for Covid booster and influenza vaccination. Recommended Bivalent in place of booster.   Review of Systems   Constitutional:  Negative for chills and fever.   HENT: Negative.     Respiratory: Negative.     Cardiovascular: Negative.    Gastrointestinal:  Positive for abdominal pain and nausea.   Genitourinary: Negative.        Objective:      Physical Exam  Constitutional:       Appearance: Normal appearance. He is obese.   Eyes:      Pupils: Pupils are equal, round, and reactive to light.      Comments: Negative for photophobia    Cardiovascular:      Rate and Rhythm: Normal rate and regular rhythm.      Pulses: Normal pulses.      Heart sounds: Normal heart sounds.   Pulmonary:      Effort: Pulmonary effort is normal.      Breath sounds: Normal breath sounds.   Abdominal:      General: Abdomen is flat. Bowel sounds are normal.      Palpations: Abdomen is soft.      Tenderness: There is no abdominal tenderness. There is no guarding or rebound.   Skin:     General: Skin is warm.   Neurological:      Mental Status: He is alert.   Psychiatric:         Mood and Affect: Mood normal.         Behavior: Behavior normal.       Assessment/Plan:     Fever, unspecified fever cause  -     POCT Urinalysis(Instrument)  -     CULTURE, BLOOD; Future; Expected date: 01/06/2023  -     CULTURE, BLOOD; Future; Expected date: 01/06/2023  -     CBC Auto Differential; Future; Expected date: 01/06/2023  -     Comprehensive Metabolic Panel; Future; Expected date: 01/06/2023  -     Sedimentation rate; Future; Expected date: 01/06/2023  -      C-Reactive Protein; Future; Expected date: 01/06/2023  -     Ambulatory referral/consult to Gastroenterology; Future; Expected date: 01/13/2023    Ulcerative colitis with complication, unspecified location  -     POCT Urinalysis(Instrument)  -     Ambulatory referral/consult to Gastroenterology; Future; Expected date: 01/13/2023    Type 2 diabetes mellitus without complication, without long-term current use of insulin    Morbid obesity    Other orders  -     levoFLOXacin (LEVAQUIN) 500 MG tablet; Take 1 tablet (500 mg total) by mouth once daily.  Dispense: 10 tablet; Refill: 0  -     metroNIDAZOLE (FLAGYL) 250 MG tablet; Take 1 tablet (250 mg total) by mouth every 8 (eight) hours.  Dispense: 30 tablet; Refill: 0       No clear source for his fever other than possibly is ulcerative colitis.  Will place him on Levaquin and Flagyl.  Refer him back to his gastroenterologist.  Check CBC CMP.  Blood cultures x2 and urine culture.  Also check sed rate and CRP.  Check glucose daily.

## 2023-01-08 LAB — BACTERIA UR CULT: NORMAL

## 2023-01-10 ENCOUNTER — TELEPHONE (OUTPATIENT)
Dept: FAMILY MEDICINE | Facility: CLINIC | Age: 48
End: 2023-01-10
Payer: COMMERCIAL

## 2023-01-10 NOTE — TELEPHONE ENCOUNTER
----- Message from Hunter Aguilar III, MD sent at 1/9/2023  9:10 PM CST -----  How is he doing?  FOLLOW-UP AS RECOMMENDED

## 2023-01-11 ENCOUNTER — HOSPITAL ENCOUNTER (OUTPATIENT)
Facility: HOSPITAL | Age: 48
Discharge: HOME | End: 2023-01-12
Attending: EMERGENCY MEDICINE | Admitting: INTERNAL MEDICINE
Payer: COMMERCIAL

## 2023-01-11 DIAGNOSIS — R19.7 DIARRHEA, UNSPECIFIED TYPE: ICD-10-CM

## 2023-01-11 DIAGNOSIS — K52.9 COLITIS: ICD-10-CM

## 2023-01-11 DIAGNOSIS — R11.2 NAUSEA AND VOMITING, UNSPECIFIED VOMITING TYPE: ICD-10-CM

## 2023-01-11 DIAGNOSIS — R10.9 ABDOMINAL PAIN, UNSPECIFIED ABDOMINAL LOCATION: Primary | ICD-10-CM

## 2023-01-11 DIAGNOSIS — R07.9 CHEST PAIN: ICD-10-CM

## 2023-01-11 PROBLEM — N20.0 BILATERAL NEPHROLITHIASIS: Chronic | Status: ACTIVE | Noted: 2023-01-11

## 2023-01-11 PROBLEM — D64.9 ANEMIA: Chronic | Status: ACTIVE | Noted: 2023-01-11

## 2023-01-11 PROBLEM — D72.829 LEUCOCYTOSIS: Status: ACTIVE | Noted: 2023-01-11

## 2023-01-11 LAB
ALBUMIN SERPL BCP-MCNC: 3.4 G/DL (ref 3.5–5.2)
ALP SERPL-CCNC: 47 U/L (ref 55–135)
ALT SERPL W/O P-5'-P-CCNC: 10 U/L (ref 10–44)
AMORPH CRY URNS QL MICRO: ABNORMAL
ANION GAP SERPL CALC-SCNC: 11 MMOL/L (ref 8–16)
AST SERPL-CCNC: 14 U/L (ref 10–40)
BACTERIA #/AREA URNS HPF: ABNORMAL /HPF
BACTERIA BLD CULT: NORMAL
BASOPHILS # BLD AUTO: 0.07 K/UL (ref 0–0.2)
BASOPHILS NFR BLD: 0.5 % (ref 0–1.9)
BILIRUB SERPL-MCNC: 0.4 MG/DL (ref 0.1–1)
BILIRUB UR QL STRIP: NEGATIVE
BILIRUB UR QL STRIP: NEGATIVE
BUN SERPL-MCNC: 13 MG/DL (ref 6–20)
CALCIUM SERPL-MCNC: 8.8 MG/DL (ref 8.7–10.5)
CHLORIDE SERPL-SCNC: 101 MMOL/L (ref 95–110)
CLARITY UR: ABNORMAL
CLARITY UR: ABNORMAL
CO2 SERPL-SCNC: 23 MMOL/L (ref 23–29)
COLOR UR: YELLOW
COLOR UR: YELLOW
CREAT SERPL-MCNC: 0.9 MG/DL (ref 0.5–1.4)
CRP SERPL-MCNC: 20.88 MG/DL
DIFFERENTIAL METHOD: ABNORMAL
EOSINOPHIL # BLD AUTO: 0.3 K/UL (ref 0–0.5)
EOSINOPHIL NFR BLD: 2.4 % (ref 0–8)
ERYTHROCYTE [DISTWIDTH] IN BLOOD BY AUTOMATED COUNT: 16.9 % (ref 11.5–14.5)
ERYTHROCYTE [SEDIMENTATION RATE] IN BLOOD BY WESTERGREN METHOD: 87 MM/HR (ref 0–10)
EST. GFR  (NO RACE VARIABLE): >60 ML/MIN/1.73 M^2
GLUCOSE SERPL-MCNC: 77 MG/DL (ref 70–110)
GLUCOSE UR QL STRIP: NEGATIVE
GLUCOSE UR QL STRIP: NEGATIVE
HCT VFR BLD AUTO: 35.3 % (ref 40–54)
HGB BLD-MCNC: 11.2 G/DL (ref 14–18)
HGB UR QL STRIP: ABNORMAL
HGB UR QL STRIP: ABNORMAL
IMM GRANULOCYTES # BLD AUTO: 0.05 K/UL (ref 0–0.04)
IMM GRANULOCYTES NFR BLD AUTO: 0.4 % (ref 0–0.5)
INFLUENZA A, MOLECULAR: NEGATIVE
INFLUENZA B, MOLECULAR: NEGATIVE
KETONES UR QL STRIP: NEGATIVE
KETONES UR QL STRIP: NEGATIVE
LACTATE SERPL-SCNC: 1.5 MMOL/L (ref 0.5–1.9)
LEUKOCYTE ESTERASE UR QL STRIP: ABNORMAL
LEUKOCYTE ESTERASE UR QL STRIP: ABNORMAL
LIPASE SERPL-CCNC: 24 U/L (ref 4–60)
LIPASE SERPL-CCNC: 24 U/L (ref 4–60)
LYMPHOCYTES # BLD AUTO: 1.9 K/UL (ref 1–4.8)
LYMPHOCYTES NFR BLD: 13.2 % (ref 18–48)
MCH RBC QN AUTO: 27.5 PG (ref 27–31)
MCHC RBC AUTO-ENTMCNC: 31.7 G/DL (ref 32–36)
MCV RBC AUTO: 87 FL (ref 82–98)
MICROSCOPIC COMMENT: ABNORMAL
MONOCYTES # BLD AUTO: 1.6 K/UL (ref 0.3–1)
MONOCYTES NFR BLD: 11.2 % (ref 4–15)
NEUTROPHILS # BLD AUTO: 10.3 K/UL (ref 1.8–7.7)
NEUTROPHILS NFR BLD: 72.3 % (ref 38–73)
NITRITE UR QL STRIP: NEGATIVE
NITRITE UR QL STRIP: NEGATIVE
NRBC BLD-RTO: 0 /100 WBC
PH UR STRIP: 8 [PH] (ref 5–8)
PH UR STRIP: 8 [PH] (ref 5–8)
PLATELET # BLD AUTO: 345 K/UL (ref 150–450)
PMV BLD AUTO: 9.4 FL (ref 9.2–12.9)
POTASSIUM SERPL-SCNC: 4.1 MMOL/L (ref 3.5–5.1)
PROT SERPL-MCNC: 7.4 G/DL (ref 6–8.4)
PROT UR QL STRIP: NEGATIVE
PROT UR QL STRIP: NEGATIVE
RBC # BLD AUTO: 4.07 M/UL (ref 4.6–6.2)
RBC #/AREA URNS HPF: 6 /HPF (ref 0–4)
SARS-COV-2 RDRP RESP QL NAA+PROBE: NEGATIVE
SODIUM SERPL-SCNC: 135 MMOL/L (ref 136–145)
SP GR UR STRIP: 1 (ref 1–1.03)
SP GR UR STRIP: 1 (ref 1–1.03)
SPECIMEN SOURCE: NORMAL
SQUAMOUS #/AREA URNS HPF: 5 /HPF
TSH SERPL DL<=0.005 MIU/L-ACNC: 1.51 UIU/ML (ref 0.34–5.6)
URN SPEC COLLECT METH UR: ABNORMAL
URN SPEC COLLECT METH UR: ABNORMAL
UROBILINOGEN UR STRIP-ACNC: NEGATIVE EU/DL
UROBILINOGEN UR STRIP-ACNC: NEGATIVE EU/DL
WBC # BLD AUTO: 14.23 K/UL (ref 3.9–12.7)
WBC #/AREA URNS HPF: 4 /HPF (ref 0–5)
YEAST URNS QL MICRO: ABNORMAL

## 2023-01-11 PROCEDURE — 83690 ASSAY OF LIPASE: CPT | Performed by: NURSE PRACTITIONER

## 2023-01-11 PROCEDURE — 25000003 PHARM REV CODE 250: Performed by: INTERNAL MEDICINE

## 2023-01-11 PROCEDURE — 81001 URINALYSIS AUTO W/SCOPE: CPT | Performed by: NURSE PRACTITIONER

## 2023-01-11 PROCEDURE — 84443 ASSAY THYROID STIM HORMONE: CPT | Performed by: EMERGENCY MEDICINE

## 2023-01-11 PROCEDURE — 63600175 PHARM REV CODE 636 W HCPCS: Performed by: INTERNAL MEDICINE

## 2023-01-11 PROCEDURE — 96365 THER/PROPH/DIAG IV INF INIT: CPT

## 2023-01-11 PROCEDURE — 80053 COMPREHEN METABOLIC PANEL: CPT | Performed by: NURSE PRACTITIONER

## 2023-01-11 PROCEDURE — 96361 HYDRATE IV INFUSION ADD-ON: CPT

## 2023-01-11 PROCEDURE — G0378 HOSPITAL OBSERVATION PER HR: HCPCS

## 2023-01-11 PROCEDURE — 96376 TX/PRO/DX INJ SAME DRUG ADON: CPT

## 2023-01-11 PROCEDURE — S0030 INJECTION, METRONIDAZOLE: HCPCS | Performed by: INTERNAL MEDICINE

## 2023-01-11 PROCEDURE — 87502 INFLUENZA DNA AMP PROBE: CPT | Performed by: NURSE PRACTITIONER

## 2023-01-11 PROCEDURE — 96375 TX/PRO/DX INJ NEW DRUG ADDON: CPT

## 2023-01-11 PROCEDURE — 25500020 PHARM REV CODE 255: Performed by: EMERGENCY MEDICINE

## 2023-01-11 PROCEDURE — 63600175 PHARM REV CODE 636 W HCPCS: Performed by: EMERGENCY MEDICINE

## 2023-01-11 PROCEDURE — 96372 THER/PROPH/DIAG INJ SC/IM: CPT | Performed by: INTERNAL MEDICINE

## 2023-01-11 PROCEDURE — 83605 ASSAY OF LACTIC ACID: CPT | Performed by: EMERGENCY MEDICINE

## 2023-01-11 PROCEDURE — 99285 EMERGENCY DEPT VISIT HI MDM: CPT | Mod: 25

## 2023-01-11 PROCEDURE — 86140 C-REACTIVE PROTEIN: CPT | Performed by: EMERGENCY MEDICINE

## 2023-01-11 PROCEDURE — 85651 RBC SED RATE NONAUTOMATED: CPT | Performed by: EMERGENCY MEDICINE

## 2023-01-11 PROCEDURE — 85025 COMPLETE CBC W/AUTO DIFF WBC: CPT | Performed by: NURSE PRACTITIONER

## 2023-01-11 PROCEDURE — 96372 THER/PROPH/DIAG INJ SC/IM: CPT | Mod: 59 | Performed by: EMERGENCY MEDICINE

## 2023-01-11 PROCEDURE — 87040 BLOOD CULTURE FOR BACTERIA: CPT | Performed by: EMERGENCY MEDICINE

## 2023-01-11 PROCEDURE — 96367 TX/PROPH/DG ADDL SEQ IV INF: CPT

## 2023-01-11 PROCEDURE — U0002 COVID-19 LAB TEST NON-CDC: HCPCS | Performed by: NURSE PRACTITIONER

## 2023-01-11 RX ORDER — DICYCLOMINE HYDROCHLORIDE 20 MG/1
20 TABLET ORAL 4 TIMES DAILY PRN
Qty: 20 TABLET | Refills: 0 | Status: SHIPPED | OUTPATIENT
Start: 2023-01-11 | End: 2023-01-16

## 2023-01-11 RX ORDER — ONDANSETRON 2 MG/ML
4 INJECTION INTRAMUSCULAR; INTRAVENOUS
Status: COMPLETED | OUTPATIENT
Start: 2023-01-11 | End: 2023-01-11

## 2023-01-11 RX ORDER — MORPHINE SULFATE 4 MG/ML
4 INJECTION, SOLUTION INTRAMUSCULAR; INTRAVENOUS
Status: COMPLETED | OUTPATIENT
Start: 2023-01-11 | End: 2023-01-11

## 2023-01-11 RX ORDER — HYDROXYZINE HYDROCHLORIDE 25 MG/1
25-50 TABLET, FILM COATED ORAL EVERY 6 HOURS PRN
COMMUNITY
Start: 2023-01-08 | End: 2023-02-11

## 2023-01-11 RX ORDER — ONDANSETRON 2 MG/ML
4 INJECTION INTRAMUSCULAR; INTRAVENOUS EVERY 8 HOURS PRN
Status: DISCONTINUED | OUTPATIENT
Start: 2023-01-11 | End: 2023-01-12 | Stop reason: HOSPADM

## 2023-01-11 RX ORDER — FAMOTIDINE 20 MG/1
40 TABLET, FILM COATED ORAL NIGHTLY
Status: DISCONTINUED | OUTPATIENT
Start: 2023-01-11 | End: 2023-01-12 | Stop reason: HOSPADM

## 2023-01-11 RX ORDER — ENOXAPARIN SODIUM 100 MG/ML
40 INJECTION SUBCUTANEOUS EVERY 12 HOURS
Status: DISCONTINUED | OUTPATIENT
Start: 2023-01-11 | End: 2023-01-12 | Stop reason: HOSPADM

## 2023-01-11 RX ORDER — NALOXONE HCL 0.4 MG/ML
0.02 VIAL (ML) INJECTION
Status: DISCONTINUED | OUTPATIENT
Start: 2023-01-11 | End: 2023-01-12 | Stop reason: HOSPADM

## 2023-01-11 RX ORDER — SODIUM,POTASSIUM PHOSPHATES 280-250MG
2 POWDER IN PACKET (EA) ORAL
Status: DISCONTINUED | OUTPATIENT
Start: 2023-01-11 | End: 2023-01-12 | Stop reason: HOSPADM

## 2023-01-11 RX ORDER — MORPHINE SULFATE 2 MG/ML
2 INJECTION, SOLUTION INTRAMUSCULAR; INTRAVENOUS EVERY 4 HOURS PRN
Status: DISCONTINUED | OUTPATIENT
Start: 2023-01-11 | End: 2023-01-12 | Stop reason: HOSPADM

## 2023-01-11 RX ORDER — MORPHINE SULFATE 2 MG/ML
2 INJECTION, SOLUTION INTRAMUSCULAR; INTRAVENOUS
Status: DISCONTINUED | OUTPATIENT
Start: 2023-01-11 | End: 2023-01-11

## 2023-01-11 RX ORDER — ZOLPIDEM TARTRATE 5 MG/1
10 TABLET ORAL NIGHTLY PRN
Status: DISCONTINUED | OUTPATIENT
Start: 2023-01-11 | End: 2023-01-12 | Stop reason: HOSPADM

## 2023-01-11 RX ORDER — LANOLIN ALCOHOL/MO/W.PET/CERES
800 CREAM (GRAM) TOPICAL
Status: DISCONTINUED | OUTPATIENT
Start: 2023-01-11 | End: 2023-01-12 | Stop reason: HOSPADM

## 2023-01-11 RX ORDER — PROMETHAZINE HYDROCHLORIDE 25 MG/ML
12.5 INJECTION, SOLUTION INTRAMUSCULAR; INTRAVENOUS
Status: COMPLETED | OUTPATIENT
Start: 2023-01-11 | End: 2023-01-11

## 2023-01-11 RX ORDER — ATORVASTATIN CALCIUM 10 MG/1
10 TABLET, FILM COATED ORAL NIGHTLY
Status: DISCONTINUED | OUTPATIENT
Start: 2023-01-11 | End: 2023-01-12 | Stop reason: HOSPADM

## 2023-01-11 RX ORDER — ONDANSETRON 4 MG/1
4 TABLET, FILM COATED ORAL EVERY 6 HOURS PRN
Qty: 20 TABLET | Refills: 0 | Status: SHIPPED | OUTPATIENT
Start: 2023-01-11 | End: 2023-01-16

## 2023-01-11 RX ORDER — METRONIDAZOLE 500 MG/100ML
500 INJECTION, SOLUTION INTRAVENOUS
Status: DISCONTINUED | OUTPATIENT
Start: 2023-01-11 | End: 2023-01-12 | Stop reason: HOSPADM

## 2023-01-11 RX ORDER — IBUPROFEN 200 MG
24 TABLET ORAL
Status: DISCONTINUED | OUTPATIENT
Start: 2023-01-11 | End: 2023-01-12 | Stop reason: HOSPADM

## 2023-01-11 RX ORDER — SODIUM CHLORIDE 9 MG/ML
INJECTION, SOLUTION INTRAVENOUS CONTINUOUS
Status: ACTIVE | OUTPATIENT
Start: 2023-01-11 | End: 2023-01-12

## 2023-01-11 RX ORDER — GLUCAGON 1 MG
1 KIT INJECTION
Status: DISCONTINUED | OUTPATIENT
Start: 2023-01-11 | End: 2023-01-12 | Stop reason: HOSPADM

## 2023-01-11 RX ORDER — IBUPROFEN 200 MG
16 TABLET ORAL
Status: DISCONTINUED | OUTPATIENT
Start: 2023-01-11 | End: 2023-01-12 | Stop reason: HOSPADM

## 2023-01-11 RX ORDER — BALSALAZIDE DISODIUM 750 MG/1
2250 CAPSULE ORAL 3 TIMES DAILY
Status: DISCONTINUED | OUTPATIENT
Start: 2023-01-12 | End: 2023-01-12 | Stop reason: HOSPADM

## 2023-01-11 RX ORDER — HYDROCODONE BITARTRATE AND ACETAMINOPHEN 5; 325 MG/1; MG/1
1 TABLET ORAL EVERY 6 HOURS PRN
Status: DISCONTINUED | OUTPATIENT
Start: 2023-01-11 | End: 2023-01-12 | Stop reason: HOSPADM

## 2023-01-11 RX ORDER — SODIUM CHLORIDE, SODIUM LACTATE, POTASSIUM CHLORIDE, CALCIUM CHLORIDE 600; 310; 30; 20 MG/100ML; MG/100ML; MG/100ML; MG/100ML
1000 INJECTION, SOLUTION INTRAVENOUS
Status: COMPLETED | OUTPATIENT
Start: 2023-01-11 | End: 2023-01-11

## 2023-01-11 RX ORDER — ACETAMINOPHEN 325 MG/1
650 TABLET ORAL EVERY 4 HOURS PRN
Status: DISCONTINUED | OUTPATIENT
Start: 2023-01-11 | End: 2023-01-12 | Stop reason: HOSPADM

## 2023-01-11 RX ORDER — METRONIDAZOLE 500 MG/100ML
500 INJECTION, SOLUTION INTRAVENOUS
Status: DISCONTINUED | OUTPATIENT
Start: 2023-01-11 | End: 2023-01-11

## 2023-01-11 RX ORDER — LEVOFLOXACIN 5 MG/ML
750 INJECTION, SOLUTION INTRAVENOUS
Status: DISCONTINUED | OUTPATIENT
Start: 2023-01-11 | End: 2023-01-12 | Stop reason: HOSPADM

## 2023-01-11 RX ORDER — LEVOFLOXACIN 5 MG/ML
750 INJECTION, SOLUTION INTRAVENOUS
Status: DISCONTINUED | OUTPATIENT
Start: 2023-01-11 | End: 2023-01-11

## 2023-01-11 RX ORDER — SERTRALINE HYDROCHLORIDE 50 MG/1
50 TABLET, FILM COATED ORAL DAILY
Status: DISCONTINUED | OUTPATIENT
Start: 2023-01-12 | End: 2023-01-12 | Stop reason: HOSPADM

## 2023-01-11 RX ORDER — ENOXAPARIN SODIUM 100 MG/ML
40 INJECTION SUBCUTANEOUS EVERY 24 HOURS
Status: DISCONTINUED | OUTPATIENT
Start: 2023-01-12 | End: 2023-01-11

## 2023-01-11 RX ORDER — SODIUM CHLORIDE 0.9 % (FLUSH) 0.9 %
10 SYRINGE (ML) INJECTION EVERY 6 HOURS PRN
Status: DISCONTINUED | OUTPATIENT
Start: 2023-01-11 | End: 2023-01-12 | Stop reason: HOSPADM

## 2023-01-11 RX ORDER — METOPROLOL SUCCINATE 50 MG/1
50 TABLET, EXTENDED RELEASE ORAL DAILY
Status: DISCONTINUED | OUTPATIENT
Start: 2023-01-12 | End: 2023-01-12 | Stop reason: HOSPADM

## 2023-01-11 RX ADMIN — ONDANSETRON 4 MG: 2 INJECTION INTRAMUSCULAR; INTRAVENOUS at 08:01

## 2023-01-11 RX ADMIN — ONDANSETRON HYDROCHLORIDE 4 MG: 2 SOLUTION INTRAMUSCULAR; INTRAVENOUS at 10:01

## 2023-01-11 RX ADMIN — SODIUM CHLORIDE, SODIUM LACTATE, POTASSIUM CHLORIDE, AND CALCIUM CHLORIDE 1000 ML: .6; .31; .03; .02 INJECTION, SOLUTION INTRAVENOUS at 11:01

## 2023-01-11 RX ADMIN — IOHEXOL 100 ML: 350 INJECTION, SOLUTION INTRAVENOUS at 05:01

## 2023-01-11 RX ADMIN — METRONIDAZOLE 500 MG: 5 INJECTION, SOLUTION INTRAVENOUS at 11:01

## 2023-01-11 RX ADMIN — ACETAMINOPHEN 650 MG: 325 TABLET ORAL at 11:01

## 2023-01-11 RX ADMIN — ZOLPIDEM TARTRATE 10 MG: 5 TABLET, COATED ORAL at 11:01

## 2023-01-11 RX ADMIN — ENOXAPARIN SODIUM 40 MG: 100 INJECTION SUBCUTANEOUS at 11:01

## 2023-01-11 RX ADMIN — SODIUM CHLORIDE: 0.9 INJECTION, SOLUTION INTRAVENOUS at 11:01

## 2023-01-11 RX ADMIN — MORPHINE SULFATE 2 MG: 2 INJECTION, SOLUTION INTRAMUSCULAR; INTRAVENOUS at 09:01

## 2023-01-11 RX ADMIN — ATORVASTATIN CALCIUM 10 MG: 10 TABLET, FILM COATED ORAL at 11:01

## 2023-01-11 RX ADMIN — FAMOTIDINE 40 MG: 20 TABLET ORAL at 11:01

## 2023-01-11 RX ADMIN — SODIUM CHLORIDE, SODIUM LACTATE, POTASSIUM CHLORIDE, AND CALCIUM CHLORIDE 1000 ML: .6; .31; .03; .02 INJECTION, SOLUTION INTRAVENOUS at 09:01

## 2023-01-11 RX ADMIN — PROMETHAZINE HYDROCHLORIDE 12.5 MG: 25 INJECTION INTRAMUSCULAR; INTRAVENOUS at 09:01

## 2023-01-11 RX ADMIN — MORPHINE SULFATE 4 MG: 4 INJECTION, SOLUTION INTRAMUSCULAR; INTRAVENOUS at 03:01

## 2023-01-11 RX ADMIN — LEVOFLOXACIN 750 MG: 750 INJECTION, SOLUTION INTRAVENOUS at 09:01

## 2023-01-11 NOTE — ED PROVIDER NOTES
Encounter Date: 1/11/2023       History     Chief Complaint   Patient presents with    Abdominal Pain     Intermittent abd pain all over with diarrhea, vomiting, headaches x 2 weeks      48-year-old male with history of ulcerative colitis, currently on Colazal, as well as diabetes, presenting for evaluation of nausea, vomiting and diarrhea intermittently for the past 2 weeks associated with diffuse, dull abdominal pain.  He reports chills but no documented fever.  He has been following with his PCP, Dr. Aguilar, as an outpatient.  Last week, 5 days ago, he was started on Cipro and Flagyl for suspected infectious colitis, and advised that if he feels worse to present to the emergency department.  Despite antibiotic therapy, he is feeling worse, was not able to tolerate anything by mouth since yesterday.  He is still having diarrhea.  He denies any black or bloody stool, hematemesis, has not traveled recently and has no sick contacts.  Prior to the last 5 days, no other antibiotic therapy. Notably he was also seen at an urgent care at which time COVID and flu testing were negative.  He has lost 60 lb in the last year, not necessarily related to this episode.  He has an appointment with Dr. Jean on Monday.      Review of patient's allergies indicates:  No Known Allergies  Past Medical History:   Diagnosis Date    Diabetes mellitus 01/2022    Hyperlipidemia 2015    Hypertension 2015    Insomnia 2015     Past Surgical History:   Procedure Laterality Date    CARDIAC ELECTROPHYSIOLOGY MAPPING AND ABLATION  2017    SVT    CHOLECYSTECTOMY  2011    COLONOSCOPY N/A 5/3/2022    Procedure: COLONOSCOPY;  Surgeon: Shan Jean III, MD;  Location: [facility];  Service: Endoscopy;  Laterality: N/A;    GANGLION CYST EXCISION Left 1992    UMBILICAL HERNIA REPAIR  2011    with mesh     History reviewed. No pertinent family history.  Social History     Tobacco Use    Smoking status: Every Day     Packs/day: 0.50     Years:  30.00     Pack years: 15.00     Types: Cigarettes    Smokeless tobacco: Never    Tobacco comments:     DO NOT SMOKE DOS   Substance Use Topics    Alcohol use: Yes     Comment: ocass    Drug use: Not Currently     Review of Systems   Constitutional:  Positive for chills and fatigue. Negative for fever and unexpected weight change.   HENT:  Negative for rhinorrhea.    Respiratory:  Negative for cough and shortness of breath.    Cardiovascular:  Negative for chest pain and leg swelling.   Gastrointestinal:  Positive for abdominal pain, diarrhea, nausea and vomiting. Negative for blood in stool and constipation.   Genitourinary:  Negative for dysuria, frequency and hematuria.   Musculoskeletal:  Positive for myalgias. Negative for back pain and neck pain.   Skin:  Negative for rash.   Neurological:  Negative for weakness, numbness and headaches.     Physical Exam     Initial Vitals [01/11/23 0908]   BP Pulse Resp Temp SpO2   133/79 106 18 98.2 °F (36.8 °C) 97 %      MAP       --         Physical Exam    Nursing note and vitals reviewed.  Constitutional: He appears well-developed and well-nourished. He is not diaphoretic. No distress.   HENT:   Head: Normocephalic and atraumatic.   Dry mucous membranes   Eyes: Conjunctivae and EOM are normal. No scleral icterus.   Neck: Neck supple.   Normal range of motion.  Cardiovascular:  Normal rate and regular rhythm.           Pulmonary/Chest: Breath sounds normal. No respiratory distress. He has no wheezes. He has no rhonchi. He has no rales.   Abdominal: Abdomen is soft. He exhibits no distension. There is abdominal tenderness.   Quiet bowel sounds.  Diffuse tenderness to palpation without rebound or guarding There is no rebound and no guarding.   Musculoskeletal:         General: No edema. Normal range of motion.      Cervical back: Normal range of motion and neck supple.     Neurological: He is alert and oriented to person, place, and time. He has normal strength. GCS score is  15. GCS eye subscore is 4. GCS verbal subscore is 5. GCS motor subscore is 6.   Skin: Skin is warm and dry. Capillary refill takes less than 2 seconds. No rash noted. No pallor.   Psychiatric: He has a normal mood and affect.       ED Course   Procedures  Labs Reviewed   CBC W/ AUTO DIFFERENTIAL - Abnormal; Notable for the following components:       Result Value    WBC 14.23 (*)     RBC 4.07 (*)     Hemoglobin 11.2 (*)     Hematocrit 35.3 (*)     MCHC 31.7 (*)     RDW 16.9 (*)     Gran # (ANC) 10.3 (*)     Immature Grans (Abs) 0.05 (*)     Mono # 1.6 (*)     Lymph % 13.2 (*)     All other components within normal limits    Narrative:     For upper or mid abdominal pain.  Recoll. [id number] by [email or web address] at 01/11/2023 15:37, reason:   hemolyzed/clotted. Notified Ursula Thorpe RN/ED   COMPREHENSIVE METABOLIC PANEL - Abnormal; Notable for the following components:    Sodium 135 (*)     Albumin 3.4 (*)     Alkaline Phosphatase 47 (*)     All other components within normal limits    Narrative:     For upper or mid abdominal pain.  Recoll. [id number] by [email or web address] at 01/11/2023 15:37, reason:   hemolyzed/clotted. Notified Ursula Thorpe RN/ED   URINALYSIS, REFLEX TO URINE CULTURE - Abnormal; Notable for the following components:    Appearance, UA Hazy (*)     Specific [place], UA 1.000 (*)     Occult Blood UA 3+ (*)     Leukocytes, UA 3+ (*)     All other components within normal limits    Narrative:     In and Out Cath as needed it patient unable to void  Specimen Source->Urine   URINALYSIS, REFLEX TO URINE CULTURE - Abnormal; Notable for the following components:    Appearance, UA Hazy (*)     Specific [place], UA 1.000 (*)     Occult Blood UA 3+ (*)     Leukocytes, UA 3+ (*)     All other components within normal limits    Narrative:     In and Out Cath as needed it patient unable to void  Specimen Source->Urine   SEDIMENTATION RATE - Abnormal; Notable for the following components:    Sed Rate 87 (*)     All other components  within normal limits    Narrative:     For upper or mid abdominal pain.   C-REACTIVE PROTEIN - Abnormal; Notable for the following components:    CRP 20.88 (*)     All other components within normal limits    Narrative:     For upper or mid abdominal pain.  Recoll. [id number] by [email or web address] at 01/11/2023 15:37, reason:   hemolyzed/clotted. Notified Ursula Thorpe RN/ED   URINALYSIS MICROSCOPIC - Abnormal; Notable for the following components:    RBC, UA 6 (*)     Bacteria Few (*)     Yeast, UA Few (*)     All other components within normal limits    Narrative:     In and Out Cath as needed it patient unable to void  Specimen Source->Urine   CULTURE, BLOOD   CULTURE, BLOOD   CULTURE, STOOL   CLOSTRIDIUM DIFFICILE   LIPASE    Narrative:     For upper or mid abdominal pain.  Recoll. [id number] by [email or web address] at 01/11/2023 15:37, reason:   hemolyzed/clotted. Notified Ursula Thorpe RN/ED   SARS-COV-2 RNA AMPLIFICATION, QUAL   INFLUENZA A AND B ANTIGEN    Narrative:     Specimen Source->Nasopharyngeal Swab   LACTIC ACID, PLASMA    Narrative:     For upper or mid abdominal pain.  Recoll. [id number] by [facility] at 01/11/2023 14:36, reason: Specimen   hemolyzed   LIPASE    Narrative:     For upper or mid abdominal pain.  Recoll. [id number] by [email or web address] at 01/11/2023 15:37, reason:   hemolyzed/clotted. Notified Ursula Thorpe RN/ED   TSH   WBC, STOOL   STOOL EXAM-OVA,CYSTS,PARASITES   POCT GLUCOSE MONITORING CONTINUOUS          Imaging Results              CT Abdomen Pelvis With Contrast (Final result)  Result time 01/11/23 19:32:26      Final result by Consuelo Shea MD (01/11/23 19:32:26)                   Narrative:    All CT scans at this facility used dose modulation, iterative reconstruction and/or weight-based dosing when appropriate to reduce radiation doses  as low as reasonably achievable.    HISTORY: Abdominal pain, diarrhea and vomiting    FINDINGS: Axial postcontrast imaging was performed with 100 mL Omnipaque 350 IV contrast  .    CT ABDOMEN: The lung bases are clear.    The liver, spleen and pancreas demonstrate normal enhancement without focal mass or biliary duct dilatation.  Gallbladder is absent  Adrenal glands are normal    The kidneys enhance symmetrically without hydronephrosis. There are bilateral nonobstructing renal stones. There is no perinephric stranding.    There is mild wall thickening of the descending colon and sigmoid colon and rectum with stranding in the adjacent fat compatible with colitis most likely infectious or inflammatory in etiology.. There is calcified plaque at the origin of the SMA resulting in 50% stenosis. The SMA, CORINA and celiac artery are patent.    There is no mesenteric or retroperitoneal adenopathy. The aorta is normal in caliber. There is no free fluid.    CT PELVIS: There is colitis of the sigmoid colon and rectum. There is no free fluid. The bladder and prostate gland are normal.    There is a 16 mm rim calcified low density mass in the anterior lower mid pelvis which may represent calcified lymph node.    There are degenerative changes of the spine.    IMPRESSION: Diffuse wall thickening and mild stranding within the fat of the descending colon, sigmoid and rectum. Findings are compatible with colitis most likely infectious or inflammatory in etiology. There is no obstruction    Prior cholecystectomy    Nonobstructing bilateral renal stones    Electronically signed by:  Consuelo Shea MD  1/11/2023 7:32 PM CST Workstation: [id number]                                     Medications   promethazine injection 12.5 mg (has no administration in time range)   lactated ringers infusion (has no administration in time range)   metronidazole IVPB 500 mg (has no administration in time range)   levoFLOXacin 750 mg/150 mL IVPB 750 mg (has no administration in time range)   ondansetron injection 4 mg (4 mg Intravenous Given 1/11/23 1030)   lactated ringers bolus 1,000 mL (0 mLs Intravenous Stopped 1/11/23  1442)   morphine injection 4 mg (4 mg Intravenous Given 1/11/23 1500)   iohexoL (OMNIPAQUE 350) injection 100 mL (100 mLs Intravenous Given 1/11/23 1745)   ondansetron injection 4 mg (4 mg Intravenous Given 1/11/23 2017)     Medical Decision Making:   History:   Old Records Summarized: records from clinic visits.       <> Summary of Records: Patient has been following with Dr. Aguilar for abdominal pain, vomiting and diarrhea.  Started on ciprofloxacin and Flagyl.  Labs obtained, concerning for leukocytosis.  Blood cultures have been negative thus far.  He is scheduled for labs to be redrawn and has GI follow-up next week.  Initial Assessment:   48-year-old male with history of ulcerative colitis, currently compliant with Colazal, also taking Flagyl and ciprofloxacin for suspected infectious colitis, presenting for continued and worsening abdominal discomfort, vomiting and diarrhea.  He is afebrile, tachycardic, otherwise hemodynamically stable.  Abdomen is soft, diffusely tender without rebound or guarding.  He appears dehydrated.  I will repeat labs here today, obtain CT scan of the abdomen and pelvis to further assess for complicated diverticulitis versus malignancy and others.  Stool sample will be collected if he is able to produce stool.  Reassess  Differential Diagnosis:   Infectious colitis, ulcerative colitis, infectious diarrhea, parasitic infection, thyroid dysfunction, DKA, malignancy and others  Clinical Tests:   Lab Tests: Ordered and Reviewed  Radiological Study: Ordered and Reviewed           ED Course as of 01/11/23 2051 Wed Jan 11, 2023   1328 Patient has no acute complaints, is now asking for pain medications. IVF are being administered. Labs not resulted, CT not yet performed. I spoke to nursing staff and the tube system was down so blood was walked up to the lab. I attempted to contact the lab to inquire about the delay but no one is answering. Nurse manager aware.  [RB]   3670 Nursing manager,  Riccardo RN, was made aware of lab issue. He was finally able to contact the lab and they request repeat draw. Patient made aware of this unfortunate delay.   [RB]   1839 Labs remarkable for improving leukocytosis.  He finally went for CT scan but his  IV didn't work. CT brought the patient back.  Nursing staff aware and is starting a new IV. [RB]   1943 CT remarkable for colitis.  As he is responding to the Cipro and Flagyl, I do believe it is infectious in nature.  He was asking to eat earlier.  As he has no surgical issues, I will give him a PO challenge.  If he is able to tolerate this, I anticipate discharge with antiemetics and pain medication.   [RB]   2009 Patient was not able to tolerate PO.  He also reports to me he has been up and down going to the bathroom with diarrhea since arrival.  We were not yet able to obtain a sample but it is ordered.  I will consult Hospital Medicine for admission for antiemetics and IV hydration. [RB]      ED Course User Index  [RB] Abigail Hurtado MD                 Clinical Impression:   Final diagnoses:  [R10.9] Abdominal pain, unspecified abdominal location (Primary)  [R11.2] Nausea and vomiting, unspecified vomiting type  [R19.7] Diarrhea, unspecified type  [K52.9] Colitis        ED Disposition Condition    Observation Stable                Abigail Hurtado MD  01/11/23 2051

## 2023-01-12 VITALS
WEIGHT: 248 LBS | HEART RATE: 91 BPM | RESPIRATION RATE: 16 BRPM | SYSTOLIC BLOOD PRESSURE: 131 MMHG | HEIGHT: 65 IN | TEMPERATURE: 98 F | BODY MASS INDEX: 41.32 KG/M2 | OXYGEN SATURATION: 97 % | DIASTOLIC BLOOD PRESSURE: 83 MMHG

## 2023-01-12 LAB
ANION GAP SERPL CALC-SCNC: 8 MMOL/L (ref 8–16)
BUN SERPL-MCNC: 10 MG/DL (ref 6–20)
C DIFF GDH STL QL: NEGATIVE
C DIFF TOX A+B STL QL IA: NEGATIVE
CALCIUM SERPL-MCNC: 8.1 MG/DL (ref 8.7–10.5)
CHLORIDE SERPL-SCNC: 101 MMOL/L (ref 95–110)
CO2 SERPL-SCNC: 25 MMOL/L (ref 23–29)
CREAT SERPL-MCNC: 0.8 MG/DL (ref 0.5–1.4)
ERYTHROCYTE [DISTWIDTH] IN BLOOD BY AUTOMATED COUNT: 16.5 % (ref 11.5–14.5)
EST. GFR  (NO RACE VARIABLE): >60 ML/MIN/1.73 M^2
GLUCOSE SERPL-MCNC: 84 MG/DL (ref 70–110)
HCT VFR BLD AUTO: 31 % (ref 40–54)
HGB BLD-MCNC: 9.9 G/DL (ref 14–18)
MAGNESIUM SERPL-MCNC: 1.4 MG/DL (ref 1.6–2.6)
MCH RBC QN AUTO: 27.5 PG (ref 27–31)
MCHC RBC AUTO-ENTMCNC: 31.9 G/DL (ref 32–36)
MCV RBC AUTO: 86 FL (ref 82–98)
PHOSPHATE SERPL-MCNC: 3.5 MG/DL (ref 2.7–4.5)
PLATELET # BLD AUTO: 295 K/UL (ref 150–450)
PMV BLD AUTO: 9.6 FL (ref 9.2–12.9)
POTASSIUM SERPL-SCNC: 3.5 MMOL/L (ref 3.5–5.1)
RBC # BLD AUTO: 3.6 M/UL (ref 4.6–6.2)
SODIUM SERPL-SCNC: 134 MMOL/L (ref 136–145)
WBC # BLD AUTO: 10.48 K/UL (ref 3.9–12.7)
WBC #/AREA STL HPF: ABNORMAL /[HPF]

## 2023-01-12 PROCEDURE — 36415 COLL VENOUS BLD VENIPUNCTURE: CPT | Performed by: INTERNAL MEDICINE

## 2023-01-12 PROCEDURE — 87046 STOOL CULTR AEROBIC BACT EA: CPT | Mod: 59 | Performed by: INTERNAL MEDICINE

## 2023-01-12 PROCEDURE — 87177 OVA AND PARASITES SMEARS: CPT | Performed by: EMERGENCY MEDICINE

## 2023-01-12 PROCEDURE — G0378 HOSPITAL OBSERVATION PER HR: HCPCS

## 2023-01-12 PROCEDURE — 96361 HYDRATE IV INFUSION ADD-ON: CPT

## 2023-01-12 PROCEDURE — 87507 IADNA-DNA/RNA PROBE TQ 12-25: CPT | Performed by: INTERNAL MEDICINE

## 2023-01-12 PROCEDURE — 89055 LEUKOCYTE ASSESSMENT FECAL: CPT | Performed by: EMERGENCY MEDICINE

## 2023-01-12 PROCEDURE — 84100 ASSAY OF PHOSPHORUS: CPT | Performed by: INTERNAL MEDICINE

## 2023-01-12 PROCEDURE — 87209 SMEAR COMPLEX STAIN: CPT | Performed by: EMERGENCY MEDICINE

## 2023-01-12 PROCEDURE — 25000003 PHARM REV CODE 250: Performed by: INTERNAL MEDICINE

## 2023-01-12 PROCEDURE — 87045 FECES CULTURE AEROBIC BACT: CPT | Performed by: EMERGENCY MEDICINE

## 2023-01-12 PROCEDURE — 85027 COMPLETE CBC AUTOMATED: CPT | Performed by: INTERNAL MEDICINE

## 2023-01-12 PROCEDURE — 83735 ASSAY OF MAGNESIUM: CPT | Performed by: INTERNAL MEDICINE

## 2023-01-12 PROCEDURE — 96376 TX/PRO/DX INJ SAME DRUG ADON: CPT

## 2023-01-12 PROCEDURE — 80048 BASIC METABOLIC PNL TOTAL CA: CPT | Performed by: INTERNAL MEDICINE

## 2023-01-12 PROCEDURE — 87449 NOS EACH ORGANISM AG IA: CPT | Performed by: EMERGENCY MEDICINE

## 2023-01-12 PROCEDURE — 63600175 PHARM REV CODE 636 W HCPCS: Performed by: INTERNAL MEDICINE

## 2023-01-12 PROCEDURE — S0030 INJECTION, METRONIDAZOLE: HCPCS | Performed by: INTERNAL MEDICINE

## 2023-01-12 PROCEDURE — 96366 THER/PROPH/DIAG IV INF ADDON: CPT

## 2023-01-12 RX ADMIN — METRONIDAZOLE 500 MG: 5 INJECTION, SOLUTION INTRAVENOUS at 02:01

## 2023-01-12 RX ADMIN — METRONIDAZOLE 500 MG: 5 INJECTION, SOLUTION INTRAVENOUS at 07:01

## 2023-01-12 RX ADMIN — POTASSIUM BICARBONATE 50 MEQ: 977.5 TABLET, EFFERVESCENT ORAL at 07:01

## 2023-01-12 RX ADMIN — HYDROCODONE BITARTRATE AND ACETAMINOPHEN 1 TABLET: 5; 325 TABLET ORAL at 01:01

## 2023-01-12 RX ADMIN — MORPHINE SULFATE 2 MG: 2 INJECTION, SOLUTION INTRAMUSCULAR; INTRAVENOUS at 05:01

## 2023-01-12 RX ADMIN — Medication 800 MG: at 07:01

## 2023-01-12 RX ADMIN — SERTRALINE HYDROCHLORIDE 50 MG: 50 TABLET ORAL at 08:01

## 2023-01-12 NOTE — HPI
Mr. Jean-Baptiste is a 48-year-old male who presented to the ED with chief complaint of nausea, vomiting and diarrhea.  Patient with a history of ulcerative colitis, on balsalazide, followed by Gastroenterology Dr. Jean, at baseline has 1-2 bowel movements per day.  Patient reports 2 weeks history of nausea, associated with vomiting, nonbloody, 2-3 episodes per day, consisting of food contents, states stopped eating yesterday due to these symptoms.  Reports ongoing diarrhea, watery, did notice small amount of blood today, BM about every 2 hours today.  Intermittent cramping diffuse abdominal pain.  Denies any fever, states he is always cold, does admit to headache, denies chest pain, shortness a of breath, urinary symptoms, lower extremity edema.  He was seen by his primary care provider on 1/06/23, was started on Levaquin and Flagyl, reports no change in symptomatology, admits to compliance with these medications.  Denies any history of sick contacts, exotic foods, preceding antibiotic use prior to symptom onset.  States he has never had prior flare up of UC.  Last colonoscopy June 2022, polyps.  In the ED afebrile.  Labs with WBC 14, hemoglobin 11.2, lactic acid 1.5, BUN/creatinine 13/0.2, CT with nonobstructing nephrolithiasis, mild wall thickening of descending, sigmoid colon and rectum consistent with colitis, infectious versus inflammatory.  He received antiemetic, IV fluids and was ordered for IV Levaquin and Flagyl in the ED.  He is still unable to tolerate oral intake as per ED provider and therefore admission requested.  Plan of care discussed with patient.

## 2023-01-12 NOTE — H&P
[facility] - Emergency Dept  Hospital Medicine  History & Physical    Patient Name: Jacoby Jean-Baptiste  MRN: [id number]  Patient Class: OP- Observation  Admission Date: 1/11/2023  Attending Physician: Moon Armas MD   Primary Care Provider: Hunter Aguilar III, MD         Patient information was obtained from patient, past medical records and ER records.     Subjective:     Principal Problem:Colitis    Chief Complaint:   Chief Complaint   Patient presents with    Abdominal Pain     Intermittent abd pain all over with diarrhea, vomiting, headaches x 2 weeks         HPI: Mr. Jean-Baptiste is a 48-year-old male who presented to the ED with chief complaint of nausea, vomiting and diarrhea.  Patient with a history of ulcerative colitis, on balsalazide, followed by Gastroenterology Dr. Jean, at baseline has 1-2 bowel movements per day.  Patient reports 2 weeks history of nausea, associated with vomiting, nonbloody, 2-3 episodes per day, consisting of food contents, states stopped eating yesterday due to these symptoms.  Reports ongoing diarrhea, watery, did notice small amount of blood today, BM about every 2 hours today.  Intermittent cramping diffuse abdominal pain.  Denies any fever, states he is always cold, does admit to headache, denies chest pain, shortness a of breath, urinary symptoms, lower extremity edema.  He was seen by his primary care provider on 1/06/23, was started on Levaquin and Flagyl, reports no change in symptomatology, admits to compliance with these medications.  Denies any history of sick contacts, exotic foods, preceding antibiotic use prior to symptom onset.  States he has never had prior flare up of UC.  Last colonoscopy June 2022, polyps.  In the ED afebrile.  Labs with WBC 14, hemoglobin 11.2, lactic acid 1.5, BUN/creatinine 13/0.2, CT with nonobstructing nephrolithiasis, mild wall thickening of descending, sigmoid colon and rectum consistent with colitis, infectious versus  inflammatory.  He received antiemetic, IV fluids and was ordered for IV Levaquin and Flagyl in the ED.  He is still unable to tolerate oral intake as per ED provider and therefore admission requested.  Plan of care discussed with patient.      Past Medical History:   Diagnosis Date    Diabetes mellitus 01/2022    Hyperlipidemia 2015    Hypertension 2015    Insomnia 2015       Past Surgical History:   Procedure Laterality Date    CARDIAC ELECTROPHYSIOLOGY MAPPING AND ABLATION  2017    SVT    CHOLECYSTECTOMY  2011    COLONOSCOPY N/A 5/3/2022    Procedure: COLONOSCOPY;  Surgeon: Shan Jean III, MD;  Location: [facility];  Service: Endoscopy;  Laterality: N/A;    GANGLION CYST EXCISION Left 1992    UMBILICAL HERNIA REPAIR  2011    with mesh       Review of patient's allergies indicates:  No Known Allergies    No current facility-administered medications on file prior to encounter.     Current Outpatient Medications on File Prior to Encounter   Medication Sig    balsalazide (COLAZAL) 750 mg capsule Take 750 mg by mouth 3 (three) times daily.    famotidine (PEPCID) 40 MG tablet Take 40 mg by mouth every evening.    hydrOXYzine HCL (ATARAX) 25 MG tablet Take 25-50 mg by mouth every 6 (six) hours as needed.    levoFLOXacin (LEVAQUIN) 500 MG tablet Take 1 tablet (500 mg total) by mouth once daily.    magnesium oxide (MAG-OX) 400 mg (241.3 mg magnesium) tablet Take 400 mg by mouth once daily. Over the counter    metFORMIN (GLUCOPHAGE-XR) 500 MG ER 24hr tablet Take 1 tablet (500 mg total) by mouth 2 (two) times daily with meals.    metoprolol succinate (TOPROL-XL) 50 MG 24 hr tablet Take 1 tablet (50 mg total) by mouth once daily.    metroNIDAZOLE (FLAGYL) 250 MG tablet Take 1 tablet (250 mg total) by mouth every 8 (eight) hours.    semaglutide (OZEMPIC) 1 mg/dose (4 mg/3 mL) Inject 1 mg into the skin every 7 days.    sertraline (ZOLOFT) 50 MG tablet Take 1 tablet (50 mg total) by mouth once daily.     simvastatin (ZOCOR) 20 MG tablet Take 1 tablet (20 mg total) by mouth nightly.    varenicline (CHANTIX) 1 mg Tab Take one tab daily for two weeks then 1 tab bid    zolpidem (AMBIEN) 10 mg Tab Take 1 tablet (10 mg total) by mouth nightly as needed (insomnia).     Family History       Problem Relation (Age of Onset)    Asthma Mother          Tobacco Use    Smoking status: Every Day     Packs/day: 1.00     Years: 30.00     Pack years: 30.00     Types: Cigarettes    Smokeless tobacco: Never    Tobacco comments:     DO NOT SMOKE DOS   Substance and Sexual Activity    Alcohol use: Yes     Comment: ocass    Drug use: Not Currently    Sexual activity: Not Currently     Review of Systems   Constitutional:  Negative for fever.        + cold   HENT:  Negative for congestion.    Respiratory:  Negative for shortness of breath.    Cardiovascular:  Negative for chest pain.   Gastrointestinal:  Positive for abdominal pain, diarrhea, nausea and vomiting.   Genitourinary:  Negative for difficulty urinating.   Musculoskeletal:  Negative for neck stiffness.   Skin:  Negative for wound.   Neurological:  Positive for headaches.   Psychiatric/Behavioral:  Negative for confusion.    Objective:     Vital Signs (Most Recent):  Temp: 98.2 °F (36.8 °C) (01/11/23 0908)  Pulse: 98 (01/11/23 2000)  Resp: 18 (01/11/23 1900)  BP: (!) 148/73 (01/11/23 2000)  SpO2: 96 % (01/11/23 2000)   Vital Signs (24h Range):  Temp:  [98.2 °F (36.8 °C)] 98.2 °F (36.8 °C)  Pulse:  [] 98  Resp:  [18-20] 18  SpO2:  [96 %-100 %] 96 %  BP: (117-152)/(59-79) 148/73     Weight: 125.6 kg (277 lb)  Body mass index is 46.1 kg/m².    Physical Exam  Vitals and nursing note reviewed.   Constitutional:       General: He is not in acute distress.     Appearance: He is obese. He is not ill-appearing, toxic-appearing or diaphoretic.      Comments: Lying in stretcher   HENT:      Head: Normocephalic and atraumatic.      Mouth/Throat:      Mouth: Mucous membranes  are moist.   Eyes:      General:         Right eye: No discharge.         Left eye: No discharge.   Cardiovascular:      Rate and Rhythm: Normal rate and regular rhythm.      Comments: No significant LE edema  Pulmonary:      Effort: Pulmonary effort is normal. No respiratory distress.      Breath sounds: Normal breath sounds. No stridor. No wheezing or rhonchi.      Comments: On RA  Abdominal:      Comments: Non distended, soft, mild tenderness epigastric area, + BS, no peritoneal signs   Genitourinary:     Comments: No joiner  Musculoskeletal:      Cervical back: Neck supple.   Skin:     General: Skin is warm and dry.   Neurological:      General: No focal deficit present.      Mental Status: He is alert and oriented to person, place, and time. Mental status is at baseline.      Comments: Speech intact, moving all four extremities   Psychiatric:         Mood and Affect: Mood normal.           Significant Labs: BMP:   Recent Labs   Lab 01/11/23  1614   GLU 77   *   K 4.1      CO2 23   BUN 13   CREATININE 0.9   CALCIUM 8.8     CBC:   Recent Labs   Lab 01/11/23  1614   WBC 14.23*   HGB 11.2*   HCT 35.3*        CMP:   Recent Labs   Lab 01/11/23  1614   *   K 4.1      CO2 23   GLU 77   BUN 13   CREATININE 0.9   CALCIUM 8.8   PROT 7.4   ALBUMIN 3.4*   BILITOT 0.4   ALKPHOS 47*   AST 14   ALT 10   ANIONGAP 11     Cardiac Markers: No results for input(s): CKMB, MYOGLOBIN, BNP, TROPISTAT in the last 48 hours.  Coagulation: No results for input(s): PT, INR, APTT in the last 48 hours.  Lactic Acid:   Recent Labs   Lab 01/11/23  1508   LACTATE 1.5     Magnesium: No results for input(s): MG in the last 48 hours.  POCT Glucose: No results for input(s): POCTGLUCOSE in the last 48 hours.  Troponin: No results for input(s): TROPONINI, TROPONINIHS in the last 48 hours.  TSH:   Recent Labs   Lab 01/11/23  1110   TSH 1.510     Urine Culture: No results for input(s): LABURIN in the last 48 hours.  Urine  Studies:   Recent Labs   Lab 01/11/23  1009   COLORU Yellow  Yellow   APPEARANCEUA Hazy*  Hazy*   PHUR 8.0  8.0   SPECGRAV 1.000*  1.000*   PROTEINUA Negative  Negative   GLUCUA Negative  Negative   KETONESU Negative  Negative   BILIRUBINUA Negative  Negative   OCCULTUA 3+*  3+*   NITRITE Negative  Negative   UROBILINOGEN Negative  Negative   LEUKOCYTESUR 3+*  3+*   RBCUA 6*   WBCUA 4   BACTERIA Few*   SQUAMEPITHEL 5       Significant Imaging: I have reviewed all pertinent imaging results/findings within the past 24 hours.    CT Abdomen Pelvis With Contrast    Result Date: 1/11/2023  All CT scans at this facility used dose modulation, iterative reconstruction and/or weight-based dosing when appropriate to reduce radiation doses  as low as reasonably achievable. HISTORY: Abdominal pain, diarrhea and vomiting FINDINGS: Axial postcontrast imaging was performed with 100 mL Omnipaque 350 IV contrast . CT ABDOMEN: The lung bases are clear. The liver, spleen and pancreas demonstrate normal enhancement without focal mass or biliary duct dilatation. Gallbladder is absent Adrenal glands are normal The kidneys enhance symmetrically without hydronephrosis. There are bilateral nonobstructing renal stones. There is no perinephric stranding. There is mild wall thickening of the descending colon and sigmoid colon and rectum with stranding in the adjacent fat compatible with colitis most likely infectious or inflammatory in etiology.. There is calcified plaque at the origin of the SMA resulting in 50% stenosis. The SMA, CORINA and celiac artery are patent. There is no mesenteric or retroperitoneal adenopathy. The aorta is normal in caliber. There is no free fluid. CT PELVIS: There is colitis of the sigmoid colon and rectum. There is no free fluid. The bladder and prostate gland are normal. There is a 16 mm rim calcified low density mass in the anterior lower mid pelvis which may represent calcified lymph node. There are  degenerative changes of the spine. IMPRESSION: Diffuse wall thickening and mild stranding within the fat of the descending colon, sigmoid and rectum. Findings are compatible with colitis most likely infectious or inflammatory in etiology. There is no obstruction Prior cholecystectomy Nonobstructing bilateral renal stones Electronically signed by:  Consuelo Shea MD  1/11/2023 7:32 PM CST Workstation: [id number]       Assessment/Plan:     Active Hospital Problems    Diagnosis    *Colitis    Leucocytosis    Anemia    Bilateral nephrolithiasis, non obstructing    Anxiety    Ulcerative colitis with complication    BMI 45.0-49.9, adult    Type 2 diabetes mellitus without complication, without long-term current use of insulin    Hyperlipidemia    Tobacco use    H/O cardiac radiofrequency ablation     Plan:    Admit observation, medical floor with remote telemetry monitoring  Continue IV fluid hydration  Clear liquid diet, advanced as tolerated   Stool studies including stool WBC, stool culture, C diff, GI pathogen panel   Continue empiric Levaquin and Flagyl   Serial abdominal examination   P.r.n. pain control as ordered   Continued counseling on need for smoking cessation   Needs lifestyle modification for weight loss  Electrolytes sliding scale repletion  A.m. labs ordered  Consult Gastroenterology, patient of   Further plan as per clinical course    VTE Risk Mitigation (From admission, onward)         Ordered     enoxaparin injection 40 mg  Every 12 hours         01/11/23 2133     IP VTE HIGH RISK PATIENT  Once         01/11/23 2121     Place sequential compression device  Until discontinued         01/11/23 2121                   Moon Armas MD  Department of Hospital Medicine   [facility] - Emergency Dept

## 2023-01-12 NOTE — PROGRESS NOTES
Pharmacist Renal Dose Adjustment Note    Jacoby Jean-Baptiste is a 48 y.o. male being treated with the medication Lovenox    Patient Data:    Vital Signs (Most Recent):  Temp: 98.2 °F (36.8 °C) (01/11/23 0908)  Pulse: 98 (01/11/23 2000)  Resp: 18 (01/11/23 1900)  BP: (!) 148/73 (01/11/23 2000)  SpO2: 96 % (01/11/23 2000)   Vital Signs (72h Range):  Temp:  [98.2 °F (36.8 °C)]   Pulse:  []   Resp:  [18-20]   BP: (117-152)/(59-79)   SpO2:  [96 %-100 %]      Recent Labs   Lab 01/06/23  0910 01/11/23  1614   CREATININE 0.8 0.9     Serum creatinine: 0.9 mg/dL 01/11/23 1614  Estimated creatinine clearance: 122.3 mL/min    Medication:Lovenox dose: 40 mg frequency Q24H will be changed to medication:Lovenox dose:40 mg frequency:Q12H    Pharmacist's Name: Rip Wilson  Pharmacist's Extension: [phone]

## 2023-01-12 NOTE — PLAN OF CARE
Patient to discharge home driving himself in his personal car. Discharge orders and chart reviewed with no further post-acute discharge needs identified at this time.  At this time, patient is cleared for discharge from Case Management standpoint.        01/12/23 1616   Final Note   Assessment Type Final Discharge Note   Anticipated Discharge Disposition Home   Hospital Resources/Appts/Education Provided   (Patient instructed to make post hospital follow up appointment with their PCP in 7 to 10 days from discharge)   Post-Acute Status   Discharge Delays None known at this time

## 2023-01-12 NOTE — PLAN OF CARE
Met with patient at bedside to complete initial assessment.    [facility]  Initial Discharge Assessment       Primary Care Provider: Hunter Aguilar III, MD    Admission Diagnosis: Colitis [K52.9]    Admission Date: 1/11/2023  Expected Discharge Date: 1/13/2023    Discharge Barriers Identified: (P) None    Payor: [employer] / Plan: [employer] TIERED / Product Type: Commercial /     Extended Emergency Contact Information  Primary Emergency Contact: Dallas Jean-Baptiste  Mobile Phone: [phone]  Relation: Brother  Preferred language: English   needed? No    Discharge Plan A: (P) Home  Discharge Plan B: (P) Home      [id number] - [facility] [address]  AT [facility]  [address] [facility] [address]  Phone: [phone] Fax: [phone]      Initial Assessment (most recent)       Adult Discharge Assessment - 01/12/23 1520          Discharge Assessment    Assessment Type Discharge Planning Assessment (P)      Confirmed/corrected address, phone number and insurance Yes (P)      Confirmed Demographics Correct on Facesheet (P)      Source of Information patient (P)      Communicated GERARDO with patient/caregiver No (P)      Reason For Admission colitis (P)      People in Home alone (P)      Facility Arrived From: home (P)      Do you expect to return to your current living situation? Yes (P)      Do you have help at home or someone to help you manage your care at home? Yes (P)      Who are your caregiver(s) and their phone number(s)? neighbors (P)      Current cognitive status: Alert/Oriented (P)      Equipment Currently Used at Home none (P)      Readmission within 30 days? No (P)      Patient currently being followed by outpatient case management? No (P)      Do you currently have service(s) that help you manage your care at home? No (P)      Do you take prescription medications? No (P)      Who is going to help you get home at  discharge? drive self (P)      How do you get to doctors appointments? car, drives self (P)      Are you on dialysis? No (P)      Do you take coumadin? No (P)      Discharge Plan A Home (P)      Discharge Plan B Home (P)      DME Needed Upon Discharge  none (P)      Discharge Plan discussed with: Patient (P)      Discharge Barriers Identified None (P)

## 2023-01-12 NOTE — SUBJECTIVE & OBJECTIVE
Past Medical History:   Diagnosis Date    Diabetes mellitus 01/2022    Hyperlipidemia 2015    Hypertension 2015    Insomnia 2015       Past Surgical History:   Procedure Laterality Date    CARDIAC ELECTROPHYSIOLOGY MAPPING AND ABLATION  2017    SVT    CHOLECYSTECTOMY  2011    COLONOSCOPY N/A 5/3/2022    Procedure: COLONOSCOPY;  Surgeon: Shan Jean III, MD;  Location: [facility];  Service: Endoscopy;  Laterality: N/A;    GANGLION CYST EXCISION Left 1992    UMBILICAL HERNIA REPAIR  2011    with mesh       Review of patient's allergies indicates:  No Known Allergies    No current facility-administered medications on file prior to encounter.     Current Outpatient Medications on File Prior to Encounter   Medication Sig    balsalazide (COLAZAL) 750 mg capsule Take 750 mg by mouth 3 (three) times daily.    famotidine (PEPCID) 40 MG tablet Take 40 mg by mouth every evening.    hydrOXYzine HCL (ATARAX) 25 MG tablet Take 25-50 mg by mouth every 6 (six) hours as needed.    levoFLOXacin (LEVAQUIN) 500 MG tablet Take 1 tablet (500 mg total) by mouth once daily.    magnesium oxide (MAG-OX) 400 mg (241.3 mg magnesium) tablet Take 400 mg by mouth once daily. Over the counter    metFORMIN (GLUCOPHAGE-XR) 500 MG ER 24hr tablet Take 1 tablet (500 mg total) by mouth 2 (two) times daily with meals.    metoprolol succinate (TOPROL-XL) 50 MG 24 hr tablet Take 1 tablet (50 mg total) by mouth once daily.    metroNIDAZOLE (FLAGYL) 250 MG tablet Take 1 tablet (250 mg total) by mouth every 8 (eight) hours.    semaglutide (OZEMPIC) 1 mg/dose (4 mg/3 mL) Inject 1 mg into the skin every 7 days.    sertraline (ZOLOFT) 50 MG tablet Take 1 tablet (50 mg total) by mouth once daily.    simvastatin (ZOCOR) 20 MG tablet Take 1 tablet (20 mg total) by mouth nightly.    varenicline (CHANTIX) 1 mg Tab Take one tab daily for two weeks then 1 tab bid    zolpidem (AMBIEN) 10 mg Tab Take 1 tablet (10 mg total) by mouth nightly as needed (insomnia).      Family History       Problem Relation (Age of Onset)    Asthma Mother          Tobacco Use    Smoking status: Every Day     Packs/day: 1.00     Years: 30.00     Pack years: 30.00     Types: Cigarettes    Smokeless tobacco: Never    Tobacco comments:     DO NOT SMOKE DOS   Substance and Sexual Activity    Alcohol use: Yes     Comment: ocass    Drug use: Not Currently    Sexual activity: Not Currently     Review of Systems   Constitutional:  Negative for fever.        + cold   HENT:  Negative for congestion.    Respiratory:  Negative for shortness of breath.    Cardiovascular:  Negative for chest pain.   Gastrointestinal:  Positive for abdominal pain, diarrhea, nausea and vomiting.   Genitourinary:  Negative for difficulty urinating.   Musculoskeletal:  Negative for neck stiffness.   Skin:  Negative for wound.   Neurological:  Positive for headaches.   Psychiatric/Behavioral:  Negative for confusion.    Objective:     Vital Signs (Most Recent):  Temp: 98.2 °F (36.8 °C) (01/11/23 0908)  Pulse: 98 (01/11/23 2000)  Resp: 18 (01/11/23 1900)  BP: (!) 148/73 (01/11/23 2000)  SpO2: 96 % (01/11/23 2000)   Vital Signs (24h Range):  Temp:  [98.2 °F (36.8 °C)] 98.2 °F (36.8 °C)  Pulse:  [] 98  Resp:  [18-20] 18  SpO2:  [96 %-100 %] 96 %  BP: (117-152)/(59-79) 148/73     Weight: 125.6 kg (277 lb)  Body mass index is 46.1 kg/m².    Physical Exam  Vitals and nursing note reviewed.   Constitutional:       General: He is not in acute distress.     Appearance: He is obese. He is not ill-appearing, toxic-appearing or diaphoretic.      Comments: Lying in stretcher   HENT:      Head: Normocephalic and atraumatic.      Mouth/Throat:      Mouth: Mucous membranes are moist.   Eyes:      General:         Right eye: No discharge.         Left eye: No discharge.   Cardiovascular:      Rate and Rhythm: Normal rate and regular rhythm.      Comments: No significant LE edema  Pulmonary:      Effort: Pulmonary effort is normal. No  respiratory distress.      Breath sounds: Normal breath sounds. No stridor. No wheezing or rhonchi.      Comments: On RA  Abdominal:      Comments: Non distended, soft, mild tenderness epigastric area, + BS, no peritoneal signs   Genitourinary:     Comments: No joiner  Musculoskeletal:      Cervical back: Neck supple.   Skin:     General: Skin is warm and dry.   Neurological:      General: No focal deficit present.      Mental Status: He is alert and oriented to person, place, and time. Mental status is at baseline.      Comments: Speech intact, moving all four extremities   Psychiatric:         Mood and Affect: Mood normal.           Significant Labs: BMP:   Recent Labs   Lab 01/11/23  1614   GLU 77   *   K 4.1      CO2 23   BUN 13   CREATININE 0.9   CALCIUM 8.8     CBC:   Recent Labs   Lab 01/11/23  1614   WBC 14.23*   HGB 11.2*   HCT 35.3*        CMP:   Recent Labs   Lab 01/11/23  1614   *   K 4.1      CO2 23   GLU 77   BUN 13   CREATININE 0.9   CALCIUM 8.8   PROT 7.4   ALBUMIN 3.4*   BILITOT 0.4   ALKPHOS 47*   AST 14   ALT 10   ANIONGAP 11     Cardiac Markers: No results for input(s): CKMB, MYOGLOBIN, BNP, TROPISTAT in the last 48 hours.  Coagulation: No results for input(s): PT, INR, APTT in the last 48 hours.  Lactic Acid:   Recent Labs   Lab 01/11/23  1508   LACTATE 1.5     Magnesium: No results for input(s): MG in the last 48 hours.  POCT Glucose: No results for input(s): POCTGLUCOSE in the last 48 hours.  Troponin: No results for input(s): TROPONINI, TROPONINIHS in the last 48 hours.  TSH:   Recent Labs   Lab 01/11/23  1110   TSH 1.510     Urine Culture: No results for input(s): LABURIN in the last 48 hours.  Urine Studies:   Recent Labs   Lab 01/11/23  1009   COLORU Yellow  Yellow   APPEARANCEUA Hazy*  Hazy*   PHUR 8.0  8.0   SPECGRAV 1.000*  1.000*   PROTEINUA Negative  Negative   GLUCUA Negative  Negative   KETONESU Negative  Negative   BILIRUBINUA Negative   Negative   OCCULTUA 3+*  3+*   NITRITE Negative  Negative   UROBILINOGEN Negative  Negative   LEUKOCYTESUR 3+*  3+*   RBCUA 6*   WBCUA 4   BACTERIA Few*   SQUAMEPITHEL 5       Significant Imaging: I have reviewed all pertinent imaging results/findings within the past 24 hours.    CT Abdomen Pelvis With Contrast    Result Date: 1/11/2023  All CT scans at this facility used dose modulation, iterative reconstruction and/or weight-based dosing when appropriate to reduce radiation doses  as low as reasonably achievable. HISTORY: Abdominal pain, diarrhea and vomiting FINDINGS: Axial postcontrast imaging was performed with 100 mL Omnipaque 350 IV contrast . CT ABDOMEN: The lung bases are clear. The liver, spleen and pancreas demonstrate normal enhancement without focal mass or biliary duct dilatation. Gallbladder is absent Adrenal glands are normal The kidneys enhance symmetrically without hydronephrosis. There are bilateral nonobstructing renal stones. There is no perinephric stranding. There is mild wall thickening of the descending colon and sigmoid colon and rectum with stranding in the adjacent fat compatible with colitis most likely infectious or inflammatory in etiology.. There is calcified plaque at the origin of the SMA resulting in 50% stenosis. The SMA, CORINA and celiac artery are patent. There is no mesenteric or retroperitoneal adenopathy. The aorta is normal in caliber. There is no free fluid. CT PELVIS: There is colitis of the sigmoid colon and rectum. There is no free fluid. The bladder and prostate gland are normal. There is a 16 mm rim calcified low density mass in the anterior lower mid pelvis which may represent calcified lymph node. There are degenerative changes of the spine. IMPRESSION: Diffuse wall thickening and mild stranding within the fat of the descending colon, sigmoid and rectum. Findings are compatible with colitis most likely infectious or inflammatory in etiology. There is no obstruction  Prior cholecystectomy Nonobstructing bilateral renal stones Electronically signed by:  Consuelo Shea MD  1/11/2023 7:32 PM CST Workstation: [id number]

## 2023-01-13 LAB
ADV 40+41 DNA STL QL NAA+NON-PROBE: NOT DETECTED
ASTRO TYP 1-8 RNA STL QL NAA+NON-PROBE: NOT DETECTED
C CAYETANENSIS DNA STL QL NAA+NON-PROBE: NOT DETECTED
C COLI+JEJ+UPSA DNA STL QL NAA+NON-PROBE: NOT DETECTED
C DIF TOX TCDA+TCDB STL QL NAA+NON-PROBE: DETECTED
CRYPTOSP DNA STL QL NAA+NON-PROBE: NOT DETECTED
E COLI O157 DNA STL QL NAA+NON-PROBE: ABNORMAL
E HISTOLYT DNA STL QL NAA+NON-PROBE: NOT DETECTED
EC STX1+STX2 GENES STL QL NAA+NON-PROBE: NOT DETECTED
ENTEROAGGREGATIVE E COLI: NOT DETECTED
ENTEROPATHOGENIC E COLI: NOT DETECTED
ETEC LTA+ST1A+ST1B TOX ST NAA+NON-PROBE: NOT DETECTED
G LAMBLIA DNA STL QL NAA+NON-PROBE: NOT DETECTED
GPP - SALMONELLA: NOT DETECTED
GPP - VIBRIO CHOLERA: NOT DETECTED
GPP - YERSINIA ENTEROCOLITICA: NOT DETECTED
NOROVIRUS GI+II RNA STL QL NAA+NON-PROBE: NOT DETECTED
PLESIOMONAS SHIGELLOIDES: NOT DETECTED
RVA RNA STL QL NAA+NON-PROBE: NOT DETECTED
SAPO I+II+IV+V RNA STL QL NAA+NON-PROBE: NOT DETECTED
SHIGELLA SP+EIEC IPAH ST NAA+NON-PROBE: NOT DETECTED
VIBRIO: NOT DETECTED

## 2023-01-13 NOTE — DISCHARGE SUMMARY
[facility] Medicine  Discharge Summary      Patient Name: Jacoby Jean-Baptiste  MRN: [id number]  [facility]: [id number]  Patient Class: OP- Observation  Admission Date: 1/11/2023  Hospital Length of Stay: 0 days  Discharge Date and Time: 1/12/2023  5:59 PM  Attending Physician: No att. providers found   Discharging Provider: Luis Miguel Oshea MD  Primary Care Provider: Hunter Aguilar III, MD    Primary Care Team: Networked reference to record PCT     HPI:   Mr. Jean-Baptiste is a 48-year-old male who presented to the ED with chief complaint of nausea, vomiting and diarrhea.  Patient with a history of ulcerative colitis, on balsalazide, followed by Gastroenterology Dr. Jean, at baseline has 1-2 bowel movements per day.  Patient reports 2 weeks history of nausea, associated with vomiting, nonbloody, 2-3 episodes per day, consisting of food contents, states stopped eating yesterday due to these symptoms.  Reports ongoing diarrhea, watery, did notice small amount of blood today, BM about every 2 hours today.  Intermittent cramping diffuse abdominal pain.  Denies any fever, states he is always cold, does admit to headache, denies chest pain, shortness a of breath, urinary symptoms, lower extremity edema.  He was seen by his primary care provider on 1/06/23, was started on Levaquin and Flagyl, reports no change in symptomatology, admits to compliance with these medications.  Denies any history of sick contacts, exotic foods, preceding antibiotic use prior to symptom onset.  States he has never had prior flare up of UC.  Last colonoscopy June 2022, polyps.  In the ED afebrile.  Labs with WBC 14, hemoglobin 11.2, lactic acid 1.5, BUN/creatinine 13/0.2, CT with nonobstructing nephrolithiasis, mild wall thickening of descending, sigmoid colon and rectum consistent with colitis, infectious versus inflammatory.  He received antiemetic, IV fluids and was ordered for IV Levaquin and Flagyl in the ED.  He is still unable  "to tolerate oral intake as per ED provider and therefore admission requested.  Plan of care discussed with patient.      * No surgery found *      Hospital Course:   Patient with Hx of UC admitted with bacterial colitis versus flare of UC.  He was already on course of Levaquin and Flagyl via his PCP.  He was instructed to come to the ED if he was feeling worse, which he did.  He was admitted, started on Iv abx and IVFs and GI consulted.  CT did reveal evidence of colitis.  I was called that he wanted to leave.  He tells me that he spent more than 14 hours in the ED, he felt he was not getting any information and "could be doing this at home."  I asked if there was anything that I could do that could fix this situation and allow him to continue care and he politely said no and requested discharge.  He will continue his course of abx (he is half way through 10 day course).  He has an apt with dr. Jean on Monday and will keep this apt.  Examined on day of discharge and alert, NAD, comfortable respirations on room air.  Lengthy discussion on return precautions.       Goals of Care Treatment Preferences:  Code Status: Full Code      Consults:     No new Assessment & Plan notes have been filed under this hospital service since the last note was generated.  Service: Hospital Medicine    Final Active Diagnoses:    Diagnosis Date Noted POA    PRINCIPAL PROBLEM:  Colitis [K52.9] 01/11/2023 Yes    Anemia [D64.9] 01/11/2023 Yes     Chronic    Leucocytosis [D72.829] 01/11/2023 Yes    Bilateral nephrolithiasis, non obstructing [N20.0] 01/11/2023 Yes     Chronic    Anxiety [F41.9] 09/21/2022 Yes    Ulcerative colitis with complication [K51.919] 06/23/2022 Yes    BMI 45.0-49.9, adult [Z68.42] 06/23/2022 Not Applicable    Type 2 diabetes mellitus without complication, without long-term current use of insulin [E11.9] 01/29/2022 Yes    Hyperlipidemia [E78.5] 01/29/2022 Yes    Tobacco use [Z72.0] 02/03/2021 Yes    H/O " cardiac radiofrequency ablation [Z98.890] 02/03/2021 Not Applicable      Problems Resolved During this Admission:       Discharged Condition: good    Disposition: Home or Self Care    Follow Up:   Follow-up Information     [facility] - Emergency Dept .    Specialty: Emergency Medicine  Why: As needed, If symptoms worsen  Contact information:  [address]  [facility] [id number] [phone]  Additional information:  1st floor           Shan Jean III, MD Follow up.    Specialty: Gastroenterology  Why: Please keep scheduled appointment with Dr. Jean for Monday  Contact information:  [address] [facility] [address]  [phone]                       Patient Instructions:      Diet diabetic     Notify your health care provider if you experience any of the following:  severe uncontrolled pain     Notify your health care provider if you experience any of the following:  temperature >100.4     Notify your health care provider if you experience any of the following:  persistent nausea and vomiting or diarrhea     Activity as tolerated       Significant Diagnostic Studies: Labs:   CMP   Recent Labs   Lab 01/11/23  1614 01/12/23  0501   * 134*   K 4.1 3.5    101   CO2 23 25   GLU 77 84   BUN 13 10   CREATININE 0.9 0.8   CALCIUM 8.8 8.1*   PROT 7.4  --    ALBUMIN 3.4*  --    BILITOT 0.4  --    ALKPHOS 47*  --    AST 14  --    ALT 10  --    ANIONGAP 11 8    and CBC   Recent Labs   Lab 01/11/23  1614 01/12/23  0501   WBC 14.23* 10.48   HGB 11.2* 9.9*   HCT 35.3* 31.0*    295       Pending Diagnostic Studies:     Procedure Component Value Units Date/Time    Gastrointestinal Pathogens Panel, PCR [[id number]] Collected: 01/12/23 0229    Order Status: Sent Lab Status: In process Updated: 01/12/23 0240    Specimen: Stool     Stool Exam-Ova,Cysts,Parasites [[id number]] Collected: 01/12/23 0229    Order Status: Sent Lab Status: In process Updated: 01/12/23 0240     Specimen: Stool          Medications:  Reconciled Home Medications:      Medication List      START taking these medications    dicyclomine 20 mg tablet  Commonly known as: BENTYL  Take 1 tablet (20 mg total) by mouth 4 (four) times daily as needed (abdominal cramps).     ondansetron 4 MG tablet  Commonly known as: ZOFRAN  Take 1 tablet (4 mg total) by mouth every 6 (six) hours as needed for Nausea.        CONTINUE taking these medications    balsalazide 750 mg capsule  Commonly known as: COLAZAL  Take 750 mg by mouth 3 (three) times daily.     famotidine 40 MG tablet  Commonly known as: PEPCID  Take 40 mg by mouth every evening.     hydrOXYzine HCL 25 MG tablet  Commonly known as: ATARAX  Take 25-50 mg by mouth every 6 (six) hours as needed.     levoFLOXacin 500 MG tablet  Commonly known as: LEVAQUIN  Take 1 tablet (500 mg total) by mouth once daily.     magnesium oxide 400 mg (241.3 mg magnesium) tablet  Commonly known as: MAG-OX  Take 400 mg by mouth once daily. Over the counter     metroNIDAZOLE 250 MG tablet  Commonly known as: FLAGYL  Take 1 tablet (250 mg total) by mouth every 8 (eight) hours.     OZEMPIC 1 mg/dose (4 mg/3 mL)  Generic drug: semaglutide  Inject 1 mg into the skin every 7 days.     sertraline 50 MG tablet  Commonly known as: ZOLOFT  Take 1 tablet (50 mg total) by mouth once daily.     varenicline 1 mg Tab  Commonly known as: CHANTIX  Take one tab daily for two weeks then 1 tab bid     zolpidem 10 mg Tab  Commonly known as: AMBIEN  Take 1 tablet (10 mg total) by mouth nightly as needed (insomnia).        ASK your doctor about these medications    metFORMIN 500 MG ER 24hr tablet  Commonly known as: GLUCOPHAGE-XR  TAKE 1 TABLET(500 MG) BY MOUTH TWICE DAILY WITH MEALS  Ask about: Which instructions should I use?     metoprolol succinate 50 MG 24 hr tablet  Commonly known as: TOPROL-XL  TAKE 1 TABLET(50 MG) BY MOUTH EVERY DAY  Ask about: Which instructions should I use?     simvastatin 20 MG  tablet  Commonly known as: ZOCOR  TAKE 1 TABLET(20 MG) BY MOUTH EVERY NIGHT  Ask about: Which instructions should I use?            Indwelling Lines/Drains at time of discharge:   Lines/Drains/Airways     None                 Time spent on the discharge of patient: 33 minutes         Luis Miguel Oshea MD  Department of Hospital Medicine  [facility]

## 2023-01-13 NOTE — HOSPITAL COURSE
"Patient with Hx of UC admitted with bacterial colitis versus flare of UC.  He was already on course of Levaquin and Flagyl via his PCP.  He was instructed to come to the ED if he was feeling worse, which he did.  He was admitted, started on Iv abx and IVFs and GI consulted.  CT did reveal evidence of colitis.  I was called that he wanted to leave.  He tells me that he spent more than 14 hours in the ED, he felt he was not getting any information and "could be doing this at home."  I asked if there was anything that I could do that could fix this situation and allow him to continue care and he politely said no and requested discharge.  He will continue his course of abx (he is half way through 10 day course).  He has an apt with dr. Jean on Monday and will keep this apt.  Examined on day of discharge and alert, NAD, comfortable respirations on room air.  Lengthy discussion on return precautions.  "

## 2023-01-14 LAB
STOOL CULTURE: NORMAL

## 2023-01-16 LAB
BACTERIA BLD CULT: NORMAL
BACTERIA BLD CULT: NORMAL

## 2023-01-19 LAB
O+P SPEC MICRO: NORMAL
O+P STL CONC: NORMAL

## 2023-01-23 ENCOUNTER — PATIENT MESSAGE (OUTPATIENT)
Dept: FAMILY MEDICINE | Facility: CLINIC | Age: 48
End: 2023-01-23
Payer: COMMERCIAL

## 2023-01-23 RX ORDER — DICYCLOMINE HYDROCHLORIDE 20 MG/1
20 TABLET ORAL
Qty: 120 TABLET | Refills: 1 | Status: SHIPPED | OUTPATIENT
Start: 2023-01-23 | End: 2023-02-22

## 2023-01-27 DIAGNOSIS — M79.89 LEG SWELLING: Primary | ICD-10-CM

## 2023-01-30 ENCOUNTER — LAB VISIT (OUTPATIENT)
Dept: LAB | Facility: HOSPITAL | Age: 48
End: 2023-01-30
Attending: FAMILY MEDICINE
Payer: COMMERCIAL

## 2023-01-30 ENCOUNTER — OFFICE VISIT (OUTPATIENT)
Dept: FAMILY MEDICINE | Facility: CLINIC | Age: 48
End: 2023-01-30
Payer: COMMERCIAL

## 2023-01-30 VITALS
HEART RATE: 85 BPM | HEIGHT: 65 IN | SYSTOLIC BLOOD PRESSURE: 112 MMHG | BODY MASS INDEX: 46.02 KG/M2 | DIASTOLIC BLOOD PRESSURE: 62 MMHG | WEIGHT: 276.19 LBS | OXYGEN SATURATION: 98 % | TEMPERATURE: 98 F

## 2023-01-30 DIAGNOSIS — K52.9 COLITIS: Primary | ICD-10-CM

## 2023-01-30 DIAGNOSIS — M79.89 LEG SWELLING: ICD-10-CM

## 2023-01-30 DIAGNOSIS — E66.01 MORBID OBESITY: ICD-10-CM

## 2023-01-30 DIAGNOSIS — R50.9 FEVER, UNSPECIFIED FEVER CAUSE: ICD-10-CM

## 2023-01-30 LAB
BACTERIA #/AREA URNS HPF: NEGATIVE /HPF
BILIRUB UR QL STRIP: NEGATIVE
BNP SERPL-MCNC: 128 PG/ML (ref 0–99)
CLARITY UR: ABNORMAL
COLOR UR: YELLOW
GLUCOSE UR QL STRIP: NEGATIVE
HGB UR QL STRIP: NEGATIVE
HYALINE CASTS #/AREA URNS LPF: 183 /LPF
KETONES UR QL STRIP: NEGATIVE
LEUKOCYTE ESTERASE UR QL STRIP: ABNORMAL
MICROSCOPIC COMMENT: ABNORMAL
NITRITE UR QL STRIP: NEGATIVE
PH UR STRIP: 6 [PH] (ref 5–8)
PROT UR QL STRIP: ABNORMAL
RBC #/AREA URNS HPF: 4 /HPF (ref 0–4)
SP GR UR STRIP: >1.03 (ref 1–1.03)
SQUAMOUS #/AREA URNS HPF: 16 /HPF
URN SPEC COLLECT METH UR: ABNORMAL
UROBILINOGEN UR STRIP-ACNC: ABNORMAL EU/DL
WBC #/AREA URNS HPF: 9 /HPF (ref 0–5)

## 2023-01-30 PROCEDURE — 3044F HG A1C LEVEL LT 7.0%: CPT | Mod: CPTII,S$GLB,, | Performed by: FAMILY MEDICINE

## 2023-01-30 PROCEDURE — 3078F DIAST BP <80 MM HG: CPT | Mod: CPTII,S$GLB,, | Performed by: FAMILY MEDICINE

## 2023-01-30 PROCEDURE — 3078F PR MOST RECENT DIASTOLIC BLOOD PRESSURE < 80 MM HG: ICD-10-PCS | Mod: CPTII,S$GLB,, | Performed by: FAMILY MEDICINE

## 2023-01-30 PROCEDURE — 3044F PR MOST RECENT HEMOGLOBIN A1C LEVEL <7.0%: ICD-10-PCS | Mod: CPTII,S$GLB,, | Performed by: FAMILY MEDICINE

## 2023-01-30 PROCEDURE — 3074F PR MOST RECENT SYSTOLIC BLOOD PRESSURE < 130 MM HG: ICD-10-PCS | Mod: CPTII,S$GLB,, | Performed by: FAMILY MEDICINE

## 2023-01-30 PROCEDURE — 1160F RVW MEDS BY RX/DR IN RCRD: CPT | Mod: CPTII,S$GLB,, | Performed by: FAMILY MEDICINE

## 2023-01-30 PROCEDURE — 3008F PR BODY MASS INDEX (BMI) DOCUMENTED: ICD-10-PCS | Mod: CPTII,S$GLB,, | Performed by: FAMILY MEDICINE

## 2023-01-30 PROCEDURE — 87040 BLOOD CULTURE FOR BACTERIA: CPT | Performed by: FAMILY MEDICINE

## 2023-01-30 PROCEDURE — 81001 URINALYSIS AUTO W/SCOPE: CPT | Performed by: FAMILY MEDICINE

## 2023-01-30 PROCEDURE — 3072F LOW RISK FOR RETINOPATHY: CPT | Mod: CPTII,S$GLB,, | Performed by: FAMILY MEDICINE

## 2023-01-30 PROCEDURE — 3008F BODY MASS INDEX DOCD: CPT | Mod: CPTII,S$GLB,, | Performed by: FAMILY MEDICINE

## 2023-01-30 PROCEDURE — 1159F MED LIST DOCD IN RCRD: CPT | Mod: CPTII,S$GLB,, | Performed by: FAMILY MEDICINE

## 2023-01-30 PROCEDURE — 83880 ASSAY OF NATRIURETIC PEPTIDE: CPT | Performed by: FAMILY MEDICINE

## 2023-01-30 PROCEDURE — 1159F PR MEDICATION LIST DOCUMENTED IN MEDICAL RECORD: ICD-10-PCS | Mod: CPTII,S$GLB,, | Performed by: FAMILY MEDICINE

## 2023-01-30 PROCEDURE — 99213 OFFICE O/P EST LOW 20 MIN: CPT | Mod: S$GLB,,, | Performed by: FAMILY MEDICINE

## 2023-01-30 PROCEDURE — 99213 PR OFFICE/OUTPT VISIT, EST, LEVL III, 20-29 MIN: ICD-10-PCS | Mod: S$GLB,,, | Performed by: FAMILY MEDICINE

## 2023-01-30 PROCEDURE — 1160F PR REVIEW ALL MEDS BY PRESCRIBER/CLIN PHARMACIST DOCUMENTED: ICD-10-PCS | Mod: CPTII,S$GLB,, | Performed by: FAMILY MEDICINE

## 2023-01-30 PROCEDURE — 3072F PR LOW RISK FOR RETINOPATHY: ICD-10-PCS | Mod: CPTII,S$GLB,, | Performed by: FAMILY MEDICINE

## 2023-01-30 PROCEDURE — 3074F SYST BP LT 130 MM HG: CPT | Mod: CPTII,S$GLB,, | Performed by: FAMILY MEDICINE

## 2023-01-30 RX ORDER — VANCOMYCIN HYDROCHLORIDE 125 MG/1
125 CAPSULE ORAL 4 TIMES DAILY
Qty: 40 CAPSULE | Refills: 0 | Status: SHIPPED | OUTPATIENT
Start: 2023-01-30 | End: 2023-02-20

## 2023-01-30 RX ORDER — FIDAXOMICIN 200 MG/1
1 TABLET, FILM COATED ORAL 2 TIMES DAILY
COMMUNITY
Start: 2023-01-20 | End: 2023-02-11

## 2023-01-30 RX ORDER — ONDANSETRON 4 MG/1
4 TABLET, FILM COATED ORAL EVERY 6 HOURS PRN
Qty: 20 TABLET | Refills: 0 | Status: ON HOLD | OUTPATIENT
Start: 2023-01-30 | End: 2023-04-25 | Stop reason: HOSPADM

## 2023-01-30 RX ORDER — ONDANSETRON 4 MG/1
TABLET, FILM COATED ORAL
Status: ON HOLD | COMMUNITY
Start: 2023-01-16 | End: 2023-04-25 | Stop reason: HOSPADM

## 2023-01-30 RX ORDER — SOD SULF/POT CHLORIDE/MAG SULF 1.479 G
TABLET ORAL
Status: ON HOLD | COMMUNITY
Start: 2023-01-20 | End: 2023-04-25 | Stop reason: HOSPADM

## 2023-01-30 RX ORDER — ZOLPIDEM TARTRATE 10 MG/1
10 TABLET ORAL NIGHTLY PRN
Qty: 30 TABLET | Refills: 2 | Status: SHIPPED | OUTPATIENT
Start: 2023-01-30 | End: 2023-05-23 | Stop reason: SDUPTHER

## 2023-01-30 RX ORDER — SEMAGLUTIDE 1.34 MG/ML
1 INJECTION, SOLUTION SUBCUTANEOUS
Qty: 1 PEN | Refills: 1 | Status: SHIPPED | OUTPATIENT
Start: 2023-01-30 | End: 2023-04-07

## 2023-01-31 ENCOUNTER — LAB VISIT (OUTPATIENT)
Dept: LAB | Facility: HOSPITAL | Age: 48
End: 2023-01-31
Attending: FAMILY MEDICINE
Payer: COMMERCIAL

## 2023-01-31 DIAGNOSIS — K52.9 COLITIS: ICD-10-CM

## 2023-01-31 LAB
ALBUMIN SERPL BCP-MCNC: 2.6 G/DL (ref 3.5–5.2)
ALP SERPL-CCNC: 64 U/L (ref 55–135)
ALT SERPL W/O P-5'-P-CCNC: 13 U/L (ref 10–44)
ANION GAP SERPL CALC-SCNC: 10 MMOL/L (ref 8–16)
AST SERPL-CCNC: 17 U/L (ref 10–40)
BILIRUB SERPL-MCNC: 0.4 MG/DL (ref 0.1–1)
BUN SERPL-MCNC: 6 MG/DL (ref 6–20)
CALCIUM SERPL-MCNC: 8.3 MG/DL (ref 8.7–10.5)
CHLORIDE SERPL-SCNC: 99 MMOL/L (ref 95–110)
CO2 SERPL-SCNC: 28 MMOL/L (ref 23–29)
CREAT SERPL-MCNC: 0.8 MG/DL (ref 0.5–1.4)
EST. GFR  (NO RACE VARIABLE): >60 ML/MIN/1.73 M^2
GLUCOSE SERPL-MCNC: 100 MG/DL (ref 70–110)
POTASSIUM SERPL-SCNC: 3.2 MMOL/L (ref 3.5–5.1)
PROT SERPL-MCNC: 6.4 G/DL (ref 6–8.4)
SODIUM SERPL-SCNC: 137 MMOL/L (ref 136–145)

## 2023-01-31 PROCEDURE — 36415 COLL VENOUS BLD VENIPUNCTURE: CPT | Performed by: FAMILY MEDICINE

## 2023-01-31 PROCEDURE — 80053 COMPREHEN METABOLIC PANEL: CPT | Performed by: FAMILY MEDICINE

## 2023-01-31 NOTE — PROGRESS NOTES
Subjective:       Patient ID: Jacoby Jean-Baptiste is a 48 y.o. male.    Chief Complaint: Follow-up    No fever now.  Nausea some vomiting.  That is worse the past few days.  Needs medicine for the nausea.  Diarrhea for 5 times a day.  Saw gastroenterology Dr. Yousif back on January 16th.  Was in the emergency room on January 12th but did not stay.  He was told he had Clostridium difficile by Gastroenterology with the test for that was neck is negative.  Was given 10 days of medication for this and still has diarrhea.  Prior colonoscopy showed some colitis.  He is to have a repeat colonoscopy on March 9th.  Feet are swelling some.  BNP was ordered it is okay.  One hundred twenty-eight.  CT done emergency room showed colitis.  Left-sided.  Was weight is been stable.  Urinalysis shows 1.030 specific gravity.      Physical examination.  Vital signs noted.  Overweight male no acute distress.  Neck without bruit.  Chest clear.  Heart regular rate rhythm.  Abdomen bowel sounds are positive soft slightly tender left mid abdomen.  No rebound.  Extremities 1 to 2+ pedal edema.      Objective:        Assessment:       1. Colitis    2. Morbid obesity          Plan:       Colitis  -     Comprehensive Metabolic Panel; Future; Expected date: 01/30/2023    Morbid obesity    Other orders  -     semaglutide (OZEMPIC) 1 mg/dose (4 mg/3 mL); Inject 1 mg into the skin every 7 days.  Dispense: 1 pen; Refill: 1  -     zolpidem (AMBIEN) 10 mg Tab; Take 1 tablet (10 mg total) by mouth nightly as needed (insomnia).  Dispense: 30 tablet; Refill: 2  -     vancomycin (VANCOCIN) 125 MG capsule; Take 1 capsule (125 mg total) by mouth 4 (four) times daily.  Dispense: 40 capsule; Refill: 0  -     ondansetron (ZOFRAN) 4 MG tablet; Take 1 tablet (4 mg total) by mouth every 6 (six) hours as needed for Nausea.  Dispense: 20 tablet; Refill: 0      Refill Ambien 10 mg HS.  Refill Ozempic 1 mg per week.  Vancomycin 125 mg q.i.d. for 10 days.  CMP ordered.   Edema maybe due to low protein.  Follow-up with his gastroenterologist.  Zofran 4 mg q.6h p.r.n. for nausea.  Twenty pills.

## 2023-02-01 ENCOUNTER — TELEPHONE (OUTPATIENT)
Dept: FAMILY MEDICINE | Facility: CLINIC | Age: 48
End: 2023-02-01
Payer: COMMERCIAL

## 2023-02-01 DIAGNOSIS — E87.6 POTASSIUM (K) DEFICIENCY: Primary | ICD-10-CM

## 2023-02-01 RX ORDER — POTASSIUM CHLORIDE 20 MEQ/1
20 TABLET, EXTENDED RELEASE ORAL DAILY
Qty: 90 TABLET | Refills: 0 | Status: SHIPPED | OUTPATIENT
Start: 2023-02-01 | End: 2023-05-09 | Stop reason: SDUPTHER

## 2023-02-01 NOTE — TELEPHONE ENCOUNTER
----- Message from Hunter Aguilar III, MD sent at 1/31/2023  9:08 PM CST -----  NORMAL followup as needed.

## 2023-02-01 NOTE — TELEPHONE ENCOUNTER
----- Message from Hunter Aguilar III, MD sent at 1/31/2023  9:09 PM CST -----  Potassium is low.  Potassium chloride 20 mEq daily.  Repeat potassium in a week.  FOLLOW-UP AS RECOMMENDED

## 2023-02-01 NOTE — TELEPHONE ENCOUNTER
No new care gaps identified.  [facility] Embedded Care Gaps. Reference number: [id number]. 2/01/2023   10:12:33 AM CST

## 2023-02-03 ENCOUNTER — TELEPHONE (OUTPATIENT)
Dept: FAMILY MEDICINE | Facility: CLINIC | Age: 48
End: 2023-02-03
Payer: COMMERCIAL

## 2023-02-03 ENCOUNTER — PATIENT MESSAGE (OUTPATIENT)
Dept: FAMILY MEDICINE | Facility: CLINIC | Age: 48
End: 2023-02-03
Payer: COMMERCIAL

## 2023-02-03 NOTE — TELEPHONE ENCOUNTER
Spoke to pt, ronel started on vanc antibiotic Monday, he seen renard around 1/18 his plan not helping. I advised pt ronel wants test for stool diff and prescribe lomotil. Pt stated he wants to go to ER get it treated asap because been going on a month.

## 2023-02-04 LAB — BACTERIA BLD CULT: NORMAL

## 2023-02-08 ENCOUNTER — LAB VISIT (OUTPATIENT)
Dept: LAB | Facility: HOSPITAL | Age: 48
End: 2023-02-08
Attending: FAMILY MEDICINE
Payer: COMMERCIAL

## 2023-02-08 ENCOUNTER — PATIENT MESSAGE (OUTPATIENT)
Dept: FAMILY MEDICINE | Facility: CLINIC | Age: 48
End: 2023-02-08
Payer: COMMERCIAL

## 2023-02-08 DIAGNOSIS — E87.6 POTASSIUM (K) DEFICIENCY: ICD-10-CM

## 2023-02-08 LAB — POTASSIUM SERPL-SCNC: 4.1 MMOL/L (ref 3.5–5.1)

## 2023-02-08 PROCEDURE — 84132 ASSAY OF SERUM POTASSIUM: CPT | Performed by: FAMILY MEDICINE

## 2023-02-08 PROCEDURE — 36415 COLL VENOUS BLD VENIPUNCTURE: CPT | Performed by: FAMILY MEDICINE

## 2023-02-10 ENCOUNTER — OFFICE VISIT (OUTPATIENT)
Dept: FAMILY MEDICINE | Facility: CLINIC | Age: 48
End: 2023-02-10
Payer: COMMERCIAL

## 2023-02-10 VITALS
TEMPERATURE: 98 F | HEIGHT: 65 IN | BODY MASS INDEX: 46.3 KG/M2 | SYSTOLIC BLOOD PRESSURE: 132 MMHG | OXYGEN SATURATION: 95 % | HEART RATE: 85 BPM | RESPIRATION RATE: 18 BRPM | WEIGHT: 277.88 LBS | DIASTOLIC BLOOD PRESSURE: 82 MMHG

## 2023-02-10 DIAGNOSIS — R19.7 DIARRHEA, UNSPECIFIED TYPE: Primary | ICD-10-CM

## 2023-02-10 PROCEDURE — 1160F PR REVIEW ALL MEDS BY PRESCRIBER/CLIN PHARMACIST DOCUMENTED: ICD-10-PCS | Mod: CPTII,S$GLB,,

## 2023-02-10 PROCEDURE — 3044F PR MOST RECENT HEMOGLOBIN A1C LEVEL <7.0%: ICD-10-PCS | Mod: CPTII,S$GLB,,

## 2023-02-10 PROCEDURE — 3044F HG A1C LEVEL LT 7.0%: CPT | Mod: CPTII,S$GLB,,

## 2023-02-10 PROCEDURE — 1160F RVW MEDS BY RX/DR IN RCRD: CPT | Mod: CPTII,S$GLB,,

## 2023-02-10 PROCEDURE — 1159F MED LIST DOCD IN RCRD: CPT | Mod: CPTII,S$GLB,,

## 2023-02-10 PROCEDURE — 3075F PR MOST RECENT SYSTOLIC BLOOD PRESS GE 130-139MM HG: ICD-10-PCS | Mod: CPTII,S$GLB,,

## 2023-02-10 PROCEDURE — 3008F BODY MASS INDEX DOCD: CPT | Mod: CPTII,S$GLB,,

## 2023-02-10 PROCEDURE — 99214 PR OFFICE/OUTPT VISIT, EST, LEVL IV, 30-39 MIN: ICD-10-PCS | Mod: S$GLB,,,

## 2023-02-10 PROCEDURE — 3079F DIAST BP 80-89 MM HG: CPT | Mod: CPTII,S$GLB,,

## 2023-02-10 PROCEDURE — 3079F PR MOST RECENT DIASTOLIC BLOOD PRESSURE 80-89 MM HG: ICD-10-PCS | Mod: CPTII,S$GLB,,

## 2023-02-10 PROCEDURE — 3075F SYST BP GE 130 - 139MM HG: CPT | Mod: CPTII,S$GLB,,

## 2023-02-10 PROCEDURE — 3072F PR LOW RISK FOR RETINOPATHY: ICD-10-PCS | Mod: CPTII,S$GLB,,

## 2023-02-10 PROCEDURE — 3072F LOW RISK FOR RETINOPATHY: CPT | Mod: CPTII,S$GLB,,

## 2023-02-10 PROCEDURE — 1159F PR MEDICATION LIST DOCUMENTED IN MEDICAL RECORD: ICD-10-PCS | Mod: CPTII,S$GLB,,

## 2023-02-10 PROCEDURE — 3008F PR BODY MASS INDEX (BMI) DOCUMENTED: ICD-10-PCS | Mod: CPTII,S$GLB,,

## 2023-02-10 PROCEDURE — 99214 OFFICE O/P EST MOD 30 MIN: CPT | Mod: S$GLB,,,

## 2023-02-10 RX ORDER — DIPHENOXYLATE HYDROCHLORIDE AND ATROPINE SULFATE 2.5; .025 MG/1; MG/1
TABLET ORAL
Qty: 40 TABLET | Refills: 1 | Status: ON HOLD | OUTPATIENT
Start: 2023-02-10 | End: 2023-04-25 | Stop reason: HOSPADM

## 2023-02-10 NOTE — PROGRESS NOTES
Subjective:       Patient ID: Jacoby Jean-Baptiste is a 48 y.o. male.    Chief Complaint: Abdominal Pain, Diarrhea, and Edema    Patient presents to clinic with complaints of continued diarrhea.  He is having 3-4 episodes of watery diarrhea daily that has been presents since Christmas.  He was diagnosed with c-diff by Gastroenterologist and treated with Dificid.  He had no improvement in symptoms.  He was started on Vancomycin and diarrhea continued.  Stool culture negative. Also negative for parasites.  He has attempted to see Dr. Avitia and is awaiting return call.  He is having discomfort in his lower abdomen.  He denies bloody stools. Colonoscopy in May with some concern for Ulcerative Colitis.      Review of patient's allergies indicates:  No Known Allergies  Social Determinants of Health     Tobacco Use: High Risk    Smoking Tobacco Use: Every Day    Smokeless Tobacco Use: Never    Passive Exposure: Not on file   Alcohol Use: Not on file   Financial Resource Strain: Not on file   Food Insecurity: Not on file   Transportation Needs: Not on file   Physical Activity: Not on file   Stress: Not on file   Social Connections: Not on file   Housing Stability: Not on file   Depression: Low Risk     Last PHQ Score: 0      Past Medical History:   Diagnosis Date    Diabetes mellitus 01/2022    Hyperlipidemia 2015    Hypertension 2015    Insomnia 2015      Past Surgical History:   Procedure Laterality Date    CARDIAC ELECTROPHYSIOLOGY MAPPING AND ABLATION  2017    SVT    CHOLECYSTECTOMY  2011    COLONOSCOPY N/A 5/3/2022    Procedure: COLONOSCOPY;  Surgeon: Shan Jean III, MD;  Location: [facility];  Service: Endoscopy;  Laterality: N/A;    GANGLION CYST EXCISION Left 1992    UMBILICAL HERNIA REPAIR  2011    with mesh      Social History     Socioeconomic History    Marital status: Single         Current Outpatient Medications:     balsalazide (COLAZAL) 750 mg capsule, Take 750 mg by mouth 3 (three) times daily.,  Disp: , Rfl:     dicyclomine (BENTYL) 20 mg tablet, Take 1 tablet (20 mg total) by mouth 4 (four) times daily before meals and nightly., Disp: 120 tablet, Rfl: 1    famotidine (PEPCID) 40 MG tablet, Take 40 mg by mouth every evening., Disp: , Rfl:     magnesium oxide (MAG-OX) 400 mg (241.3 mg magnesium) tablet, Take 400 mg by mouth once daily. Over the counter, Disp: , Rfl:     metFORMIN (GLUCOPHAGE-XR) 500 MG ER 24hr tablet, TAKE 1 TABLET(500 MG) BY MOUTH TWICE DAILY WITH MEALS, Disp: 180 tablet, Rfl: 1    metoprolol succinate (TOPROL-XL) 50 MG 24 hr tablet, TAKE 1 TABLET(50 MG) BY MOUTH EVERY DAY, Disp: 90 tablet, Rfl: 1    ondansetron (ZOFRAN) 4 MG tablet, Take by mouth., Disp: , Rfl:     potassium chloride SA (K-DUR,KLOR-CON) 20 MEQ tablet, Take 1 tablet (20 mEq total) by mouth once daily., Disp: 90 tablet, Rfl: 0    semaglutide (OZEMPIC) 1 mg/dose (4 mg/3 mL), Inject 1 mg into the skin every 7 days., Disp: 1 pen, Rfl: 1    sertraline (ZOLOFT) 50 MG tablet, Take 1 tablet (50 mg total) by mouth once daily., Disp: 90 tablet, Rfl: 1    simvastatin (ZOCOR) 20 MG tablet, TAKE 1 TABLET(20 MG) BY MOUTH EVERY NIGHT, Disp: 90 tablet, Rfl: 1    SUTAB 1.479-0.188- 0.225 gram tablet, SMARTSI Tablet(s) By Mouth As Directed, Disp: , Rfl:     vancomycin (VANCOCIN) 125 MG capsule, Take 1 capsule (125 mg total) by mouth 4 (four) times daily., Disp: 40 capsule, Rfl: 0    zolpidem (AMBIEN) 10 mg Tab, Take 1 tablet (10 mg total) by mouth nightly as needed (insomnia)., Disp: 30 tablet, Rfl: 2    DIFICID 200 mg Tab, Take 1 tablet by mouth 2 (two) times daily., Disp: , Rfl:     diphenoxylate-atropine 2.5-0.025 mg (LOMOTIL) 2.5-0.025 mg per tablet, Take 1-2 tablets by mouth 4 times daily as needed for diarrhea., Disp: 40 tablet, Rfl: 1    hydrOXYzine HCL (ATARAX) 25 MG tablet, Take 25-50 mg by mouth every 6 (six) hours as needed., Disp: , Rfl:     levoFLOXacin (LEVAQUIN) 500 MG tablet, Take 1 tablet (500 mg total) by mouth once  daily., Disp: 10 tablet, Rfl: 0    metroNIDAZOLE (FLAGYL) 250 MG tablet, Take 1 tablet (250 mg total) by mouth every 8 (eight) hours., Disp: 30 tablet, Rfl: 0    ondansetron (ZOFRAN) 4 MG tablet, Take 1 tablet (4 mg total) by mouth every 6 (six) hours as needed for Nausea., Disp: 20 tablet, Rfl: 0    varenicline (CHANTIX) 1 mg Tab, Take one tab daily for two weeks then 1 tab bid (Patient not taking: Reported on 2/10/2023), Disp: 56 tablet, Rfl: 1    Lab Results   Component Value Date    WBC 10.48 2023    HGB 9.9 (L) 2023    HCT 31.0 (L) 2023     2023    CHOL 103 (L) 2023    TRIG 86 2023    HDL 41 2023    ALT 13 2023    AST 17 2023     2023    K 4.1 2023    CL 99 2023    CREATININE 0.8 2023    BUN 6 2023    CO2 28 2023    TSH 1.510 2023    PSA 0.24 03/10/2021    HGBA1C 5.8 2023    MICROALBUR 1.2 2022       Review of Systems   Constitutional:  Negative for chills and fever.   Gastrointestinal:  Positive for abdominal pain, change in bowel habit, diarrhea and change in bowel habit. Negative for blood in stool, nausea and vomiting.     Objective:      Physical Exam  Vitals reviewed.   Constitutional:       Appearance: He is ill-appearing.   Cardiovascular:      Rate and Rhythm: Normal rate and regular rhythm.      Pulses: Normal pulses.           Radial pulses are 2+ on the right side and 2+ on the left side.        Posterior tibial pulses are 2+ on the right side and 2+ on the left side.      Heart sounds: Normal heart sounds, S1 normal and S2 normal.   Pulmonary:      Effort: Pulmonary effort is normal.      Breath sounds: Normal breath sounds.   Abdominal:      General: Bowel sounds are increased.      Palpations: Abdomen is soft.      Tenderness: There is abdominal tenderness in the right lower quadrant, suprapubic area and left lower quadrant.   Musculoskeletal:      Right lower le+ Edema  present.      Left lower le+ Edema present.   Neurological:      Mental Status: He is alert.       Assessment:       1. Diarrhea, unspecified type        Plan:       Jacoby was seen today for abdominal pain, diarrhea and edema.    Diagnoses and all orders for this visit:    Diarrhea, unspecified type  -     Celiac Disease Panel; Future  -     diphenoxylate-atropine 2.5-0.025 mg (LOMOTIL) 2.5-0.025 mg per tablet; Take 1-2 tablets by mouth 4 times daily as needed for diarrhea.    Lomotil for diarrhea.  Will check stool for occult blood and recheck for OCP. Will check celiac panel.  Office as able to reach Dr. Avitia's office and they have appointment on Wednesday 2/15.  They requested patient call and schedule.    Will follow up with patient with resulting labs.

## 2023-02-13 ENCOUNTER — LAB VISIT (OUTPATIENT)
Dept: LAB | Facility: HOSPITAL | Age: 48
End: 2023-02-13
Payer: COMMERCIAL

## 2023-02-13 DIAGNOSIS — R19.7 DIARRHEA, UNSPECIFIED TYPE: ICD-10-CM

## 2023-02-13 PROCEDURE — 83516 IMMUNOASSAY NONANTIBODY: CPT

## 2023-02-13 PROCEDURE — 86364 TISS TRNSGLTMNASE EA IG CLAS: CPT | Mod: 59

## 2023-02-13 PROCEDURE — 36415 COLL VENOUS BLD VENIPUNCTURE: CPT

## 2023-02-16 LAB
GLIADIN PEPTIDE IGA SER-ACNC: 3.6 U/ML
GLIADIN PEPTIDE IGG SER-ACNC: 0.9 U/ML
IGA SERPL-MCNC: 350 MG/DL (ref 70–400)
TTG IGA SER-ACNC: 1.1 U/ML
TTG IGG SER-ACNC: 0.9 U/ML

## 2023-02-16 NOTE — PROGRESS NOTES
Celiac screening negative. Please call and see if diarrhea has improved. Also has he seen Dr. Avitia yet.

## 2023-02-17 ENCOUNTER — LAB VISIT (OUTPATIENT)
Dept: LAB | Facility: HOSPITAL | Age: 48
End: 2023-02-17
Payer: COMMERCIAL

## 2023-02-17 DIAGNOSIS — A04.72 INTESTINAL INFECTION DUE TO CLOSTRIDIUM DIFFICILE: Primary | ICD-10-CM

## 2023-02-17 DIAGNOSIS — E66.01 MORBID OBESITY: ICD-10-CM

## 2023-02-17 DIAGNOSIS — K51.20 ULCERATIVE (CHRONIC) PROCTITIS: ICD-10-CM

## 2023-02-17 PROCEDURE — 87449 NOS EACH ORGANISM AG IA: CPT

## 2023-02-18 LAB
C DIFF GDH STL QL: POSITIVE
C DIFF TOX A+B STL QL IA: POSITIVE

## 2023-02-20 ENCOUNTER — HOSPITAL ENCOUNTER (OUTPATIENT)
Dept: RADIOLOGY | Facility: HOSPITAL | Age: 48
Discharge: HOME | End: 2023-02-20
Payer: COMMERCIAL

## 2023-02-20 ENCOUNTER — OFFICE VISIT (OUTPATIENT)
Dept: FAMILY MEDICINE | Facility: CLINIC | Age: 48
End: 2023-02-20
Payer: COMMERCIAL

## 2023-02-20 VITALS
TEMPERATURE: 98 F | DIASTOLIC BLOOD PRESSURE: 78 MMHG | WEIGHT: 265.31 LBS | SYSTOLIC BLOOD PRESSURE: 126 MMHG | HEIGHT: 65 IN | OXYGEN SATURATION: 100 % | HEART RATE: 83 BPM | BODY MASS INDEX: 44.2 KG/M2

## 2023-02-20 DIAGNOSIS — M79.605 LEFT LEG PAIN: ICD-10-CM

## 2023-02-20 DIAGNOSIS — A04.72 C. DIFFICILE DIARRHEA: ICD-10-CM

## 2023-02-20 DIAGNOSIS — M79.605 LEFT LEG PAIN: Primary | ICD-10-CM

## 2023-02-20 DIAGNOSIS — M79.89 LEFT LEG SWELLING: ICD-10-CM

## 2023-02-20 PROCEDURE — 1160F RVW MEDS BY RX/DR IN RCRD: CPT | Mod: CPTII,S$GLB,,

## 2023-02-20 PROCEDURE — 3078F PR MOST RECENT DIASTOLIC BLOOD PRESSURE < 80 MM HG: ICD-10-PCS | Mod: CPTII,S$GLB,,

## 2023-02-20 PROCEDURE — 99214 OFFICE O/P EST MOD 30 MIN: CPT | Mod: S$GLB,,,

## 2023-02-20 PROCEDURE — 3072F PR LOW RISK FOR RETINOPATHY: ICD-10-PCS | Mod: CPTII,S$GLB,,

## 2023-02-20 PROCEDURE — 93971 EXTREMITY STUDY: CPT | Mod: TC,LT

## 2023-02-20 PROCEDURE — 3044F HG A1C LEVEL LT 7.0%: CPT | Mod: CPTII,S$GLB,,

## 2023-02-20 PROCEDURE — 99214 PR OFFICE/OUTPT VISIT, EST, LEVL IV, 30-39 MIN: ICD-10-PCS | Mod: S$GLB,,,

## 2023-02-20 PROCEDURE — 1159F PR MEDICATION LIST DOCUMENTED IN MEDICAL RECORD: ICD-10-PCS | Mod: CPTII,S$GLB,,

## 2023-02-20 PROCEDURE — 3072F LOW RISK FOR RETINOPATHY: CPT | Mod: CPTII,S$GLB,,

## 2023-02-20 PROCEDURE — 3074F PR MOST RECENT SYSTOLIC BLOOD PRESSURE < 130 MM HG: ICD-10-PCS | Mod: CPTII,S$GLB,,

## 2023-02-20 PROCEDURE — 3008F BODY MASS INDEX DOCD: CPT | Mod: CPTII,S$GLB,,

## 2023-02-20 PROCEDURE — 1159F MED LIST DOCD IN RCRD: CPT | Mod: CPTII,S$GLB,,

## 2023-02-20 PROCEDURE — 3074F SYST BP LT 130 MM HG: CPT | Mod: CPTII,S$GLB,,

## 2023-02-20 PROCEDURE — 1160F PR REVIEW ALL MEDS BY PRESCRIBER/CLIN PHARMACIST DOCUMENTED: ICD-10-PCS | Mod: CPTII,S$GLB,,

## 2023-02-20 PROCEDURE — 3044F PR MOST RECENT HEMOGLOBIN A1C LEVEL <7.0%: ICD-10-PCS | Mod: CPTII,S$GLB,,

## 2023-02-20 PROCEDURE — 3008F PR BODY MASS INDEX (BMI) DOCUMENTED: ICD-10-PCS | Mod: CPTII,S$GLB,,

## 2023-02-20 PROCEDURE — 3078F DIAST BP <80 MM HG: CPT | Mod: CPTII,S$GLB,,

## 2023-02-20 RX ORDER — FIDAXOMICIN 200 MG/1
1 TABLET, FILM COATED ORAL 2 TIMES DAILY
Status: ON HOLD | COMMUNITY
Start: 2023-02-17 | End: 2023-03-21 | Stop reason: HOSPADM

## 2023-02-20 NOTE — PROGRESS NOTES
Subjective:       Patient ID: Jacoby Jean-Baptiste is a 48 y.o. male.    Chief Complaint: Follow-up    Follow up for complaints of diarrhea. He was seen by Dr. Avitia and was diagnosed with C-Diff.  He was prescribed Dificid but pharmacy has been out so has not started yet.  He is still taking lomotil which does help some.  Patient states he was told if medication does not help he will need a fecal transplant.  Having diarrhea 2-3 times per day.  Also has swelling in his left leg that has been going on for 2 weeks. Leg is painful to touch.  Celiac panel was negative.         Review of patient's allergies indicates:  No Known Allergies  Social Determinants of Health     Tobacco Use: High Risk    Smoking Tobacco Use: Every Day    Smokeless Tobacco Use: Never    Passive Exposure: Not on file   Alcohol Use: Not on file   Financial Resource Strain: Not on file   Food Insecurity: Not on file   Transportation Needs: Not on file   Physical Activity: Not on file   Stress: Not on file   Social Connections: Not on file   Housing Stability: Not on file   Depression: Low Risk     Last PHQ Score: 0      Past Medical History:   Diagnosis Date    Diabetes mellitus 01/2022    Hyperlipidemia 2015    Hypertension 2015    Insomnia 2015      Past Surgical History:   Procedure Laterality Date    CARDIAC ELECTROPHYSIOLOGY MAPPING AND ABLATION  2017    SVT    CHOLECYSTECTOMY  2011    COLONOSCOPY N/A 5/3/2022    Procedure: COLONOSCOPY;  Surgeon: Shan Jean III, MD;  Location: [facility];  Service: Endoscopy;  Laterality: N/A;    GANGLION CYST EXCISION Left 1992    UMBILICAL HERNIA REPAIR  2011    with mesh      Social History     Socioeconomic History    Marital status: Single         Current Outpatient Medications:     balsalazide (COLAZAL) 750 mg capsule, Take 750 mg by mouth 3 (three) times daily., Disp: , Rfl:     dicyclomine (BENTYL) 20 mg tablet, Take 1 tablet (20 mg total) by mouth 4 (four) times daily before meals and  nightly., Disp: 120 tablet, Rfl: 1    DIFICID 200 mg Tab, Take 1 tablet by mouth 2 (two) times daily., Disp: , Rfl:     diphenoxylate-atropine 2.5-0.025 mg (LOMOTIL) 2.5-0.025 mg per tablet, Take 1-2 tablets by mouth 4 times daily as needed for diarrhea., Disp: 40 tablet, Rfl: 1    famotidine (PEPCID) 40 MG tablet, Take 40 mg by mouth every evening., Disp: , Rfl:     magnesium oxide (MAG-OX) 400 mg (241.3 mg magnesium) tablet, Take 400 mg by mouth once daily. Over the counter, Disp: , Rfl:     metFORMIN (GLUCOPHAGE-XR) 500 MG ER 24hr tablet, TAKE 1 TABLET(500 MG) BY MOUTH TWICE DAILY WITH MEALS, Disp: 180 tablet, Rfl: 1    metoprolol succinate (TOPROL-XL) 50 MG 24 hr tablet, TAKE 1 TABLET(50 MG) BY MOUTH EVERY DAY, Disp: 90 tablet, Rfl: 1    ondansetron (ZOFRAN) 4 MG tablet, Take by mouth., Disp: , Rfl:     ondansetron (ZOFRAN) 4 MG tablet, Take 1 tablet (4 mg total) by mouth every 6 (six) hours as needed for Nausea., Disp: 20 tablet, Rfl: 0    potassium chloride SA (K-DUR,KLOR-CON) 20 MEQ tablet, Take 1 tablet (20 mEq total) by mouth once daily., Disp: 90 tablet, Rfl: 0    semaglutide (OZEMPIC) 1 mg/dose (4 mg/3 mL), Inject 1 mg into the skin every 7 days., Disp: 1 pen, Rfl: 1    sertraline (ZOLOFT) 50 MG tablet, Take 1 tablet (50 mg total) by mouth once daily., Disp: 90 tablet, Rfl: 1    simvastatin (ZOCOR) 20 MG tablet, TAKE 1 TABLET(20 MG) BY MOUTH EVERY NIGHT, Disp: 90 tablet, Rfl: 1    SUTAB 1.479-0.188- 0.225 gram tablet, SMARTSI Tablet(s) By Mouth As Directed, Disp: , Rfl:     zolpidem (AMBIEN) 10 mg Tab, Take 1 tablet (10 mg total) by mouth nightly as needed (insomnia)., Disp: 30 tablet, Rfl: 2    vancomycin (VANCOCIN) 125 MG capsule, Take 1 capsule (125 mg total) by mouth 4 (four) times daily., Disp: 40 capsule, Rfl: 0    varenicline (CHANTIX) 1 mg Tab, Take one tab daily for two weeks then 1 tab bid, Disp: 56 tablet, Rfl: 1    Lab Results   Component Value Date    WBC 10.48 2023    HGB 9.9 (L)  01/12/2023    HCT 31.0 (L) 01/12/2023     01/12/2023    CHOL 103 (L) 01/04/2023    TRIG 86 01/04/2023    HDL 41 01/04/2023    ALT 13 01/31/2023    AST 17 01/31/2023     01/31/2023    K 4.1 02/08/2023    CL 99 01/31/2023    CREATININE 0.8 01/31/2023    BUN 6 01/31/2023    CO2 28 01/31/2023    TSH 1.510 01/11/2023    PSA 0.24 03/10/2021    HGBA1C 5.8 01/04/2023    MICROALBUR 1.2 01/26/2022       Review of Systems   Constitutional:  Negative for chills and fever.   Respiratory:  Negative for shortness of breath.    Cardiovascular:  Positive for leg swelling. Negative for chest pain and palpitations.   Gastrointestinal:  Positive for abdominal pain and diarrhea. Negative for nausea and vomiting.   Musculoskeletal:  Positive for leg pain.     Objective:      Physical Exam  Vitals reviewed.   Constitutional:       Appearance: Normal appearance.   Cardiovascular:      Rate and Rhythm: Normal rate and regular rhythm.      Pulses: Normal pulses.           Radial pulses are 2+ on the right side and 2+ on the left side.        Posterior tibial pulses are 2+ on the right side and 2+ on the left side.      Heart sounds: Normal heart sounds, S1 normal and S2 normal.   Pulmonary:      Effort: Pulmonary effort is normal.      Breath sounds: Normal breath sounds.   Abdominal:      General: Bowel sounds are increased.      Tenderness: There is generalized abdominal tenderness.   Musculoskeletal:      Left lower leg: Swelling present. 1+ Edema present.      Right foot: Normal.      Left foot: Normal. Normal capillary refill.      Comments: Localized swelling noted on left lower extremity.  Painful to palpation on inner aspect of calf.  Negative Homans sign.    Skin:     General: Skin is warm and dry.      Capillary Refill: Capillary refill takes less than 2 seconds.   Neurological:      Mental Status: He is alert.       Assessment:       1. Left leg pain    2. Left leg swelling    3. C. difficile diarrhea        Plan:        Jacoby was seen today for follow-up.    Diagnoses and all orders for this visit:    Left leg pain  -     Cancel: CV Ultrasound doppler venous DVT leg left; Future  -     US Lower Extremity Veins Left; Future    Left leg swelling  -     Cancel: CV Ultrasound doppler venous DVT leg left; Future  -     US Lower Extremity Veins Left; Future    C. difficile diarrhea     Patient will have Dificid prescription transferred to [facility] to avoid any further delay in treatment of C-diff.  Will get stat ultrasound of left lower extremity to assess for DVT.  Patient to follow up with GI as scheduled.  Recommend to discontinue use of lomotil.  Maintain hydration by drinking small amounts of clear fluids frequently, then soft diet, and then advance diet as tolerated.  Call if symptoms worsen, high fever, severe weakness or fainting, increased abdominal pain, blood in stool or vomit, or failure to improve.    Follow up in clinic in 1 month or sooner if needed.

## 2023-03-07 ENCOUNTER — LAB VISIT (OUTPATIENT)
Dept: LAB | Facility: HOSPITAL | Age: 48
End: 2023-03-07
Payer: COMMERCIAL

## 2023-03-07 DIAGNOSIS — A04.72 C. DIFFICILE DIARRHEA: ICD-10-CM

## 2023-03-07 LAB
C DIFF GDH STL QL: POSITIVE
C DIFF TOX A+B STL QL IA: NEGATIVE
C DIFF TOX GENS STL QL NAA+PROBE: POSITIVE

## 2023-03-07 PROCEDURE — 87449 NOS EACH ORGANISM AG IA: CPT | Performed by: INTERNAL MEDICINE

## 2023-03-07 PROCEDURE — 87493 C DIFF AMPLIFIED PROBE: CPT | Performed by: INTERNAL MEDICINE

## 2023-03-31 DIAGNOSIS — E11.9 TYPE 2 DIABETES MELLITUS WITHOUT COMPLICATION: ICD-10-CM

## 2023-04-03 ENCOUNTER — PATIENT MESSAGE (OUTPATIENT)
Dept: ADMINISTRATIVE | Facility: HOSPITAL | Age: 48
End: 2023-04-03
Payer: COMMERCIAL

## 2023-04-06 ENCOUNTER — LAB VISIT (OUTPATIENT)
Dept: LAB | Facility: HOSPITAL | Age: 48
End: 2023-04-06
Attending: INTERNAL MEDICINE
Payer: COMMERCIAL

## 2023-04-06 DIAGNOSIS — E66.01 MORBID OBESITY: ICD-10-CM

## 2023-04-06 DIAGNOSIS — K51.818 OTHER ULCERATIVE COLITIS WITH OTHER COMPLICATION: ICD-10-CM

## 2023-04-06 DIAGNOSIS — A04.72 INTESTINAL INFECTION DUE TO CLOSTRIDIUM DIFFICILE: Primary | ICD-10-CM

## 2023-04-06 LAB
ALBUMIN SERPL BCP-MCNC: 2.7 G/DL (ref 3.5–5.2)
ALP SERPL-CCNC: 74 U/L (ref 55–135)
ALT SERPL W/O P-5'-P-CCNC: 9 U/L (ref 10–44)
ANION GAP SERPL CALC-SCNC: 8 MMOL/L (ref 8–16)
AST SERPL-CCNC: 10 U/L (ref 10–40)
BASOPHILS # BLD AUTO: 0.06 K/UL (ref 0–0.2)
BASOPHILS NFR BLD: 0.5 % (ref 0–1.9)
BILIRUB SERPL-MCNC: 0.2 MG/DL (ref 0.1–1)
BUN SERPL-MCNC: 17 MG/DL (ref 6–20)
CALCIUM SERPL-MCNC: 8.7 MG/DL (ref 8.7–10.5)
CHLORIDE SERPL-SCNC: 101 MMOL/L (ref 95–110)
CO2 SERPL-SCNC: 26 MMOL/L (ref 23–29)
CREAT SERPL-MCNC: 0.8 MG/DL (ref 0.5–1.4)
CRP SERPL-MCNC: 11.55 MG/DL
DIFFERENTIAL METHOD: ABNORMAL
EOSINOPHIL # BLD AUTO: 0.6 K/UL (ref 0–0.5)
EOSINOPHIL NFR BLD: 5.3 % (ref 0–8)
ERYTHROCYTE [DISTWIDTH] IN BLOOD BY AUTOMATED COUNT: 17 % (ref 11.5–14.5)
EST. GFR  (NO RACE VARIABLE): >60 ML/MIN/1.73 M^2
GLUCOSE SERPL-MCNC: 78 MG/DL (ref 70–110)
HCT VFR BLD AUTO: 33.3 % (ref 40–54)
HGB BLD-MCNC: 10.2 G/DL (ref 14–18)
IMM GRANULOCYTES # BLD AUTO: 0.04 K/UL (ref 0–0.04)
IMM GRANULOCYTES NFR BLD AUTO: 0.4 % (ref 0–0.5)
LYMPHOCYTES # BLD AUTO: 2 K/UL (ref 1–4.8)
LYMPHOCYTES NFR BLD: 17.6 % (ref 18–48)
MCH RBC QN AUTO: 29.1 PG (ref 27–31)
MCHC RBC AUTO-ENTMCNC: 30.6 G/DL (ref 32–36)
MCV RBC AUTO: 95 FL (ref 82–98)
MONOCYTES # BLD AUTO: 1.1 K/UL (ref 0.3–1)
MONOCYTES NFR BLD: 10 % (ref 4–15)
NEUTROPHILS # BLD AUTO: 7.4 K/UL (ref 1.8–7.7)
NEUTROPHILS NFR BLD: 66.2 % (ref 38–73)
NRBC BLD-RTO: 0 /100 WBC
PLATELET # BLD AUTO: 403 K/UL (ref 150–450)
PMV BLD AUTO: 8.9 FL (ref 9.2–12.9)
POTASSIUM SERPL-SCNC: 5.1 MMOL/L (ref 3.5–5.1)
PROT SERPL-MCNC: 7.4 G/DL (ref 6–8.4)
RBC # BLD AUTO: 3.51 M/UL (ref 4.6–6.2)
SODIUM SERPL-SCNC: 135 MMOL/L (ref 136–145)
WBC # BLD AUTO: 11.23 K/UL (ref 3.9–12.7)

## 2023-04-06 PROCEDURE — 86480 TB TEST CELL IMMUN MEASURE: CPT | Performed by: INTERNAL MEDICINE

## 2023-04-06 PROCEDURE — 80053 COMPREHEN METABOLIC PANEL: CPT | Performed by: INTERNAL MEDICINE

## 2023-04-06 PROCEDURE — 86140 C-REACTIVE PROTEIN: CPT | Performed by: INTERNAL MEDICINE

## 2023-04-06 PROCEDURE — 87389 HIV-1 AG W/HIV-1&-2 AB AG IA: CPT | Performed by: INTERNAL MEDICINE

## 2023-04-06 PROCEDURE — 80074 ACUTE HEPATITIS PANEL: CPT | Performed by: INTERNAL MEDICINE

## 2023-04-06 PROCEDURE — 85025 COMPLETE CBC W/AUTO DIFF WBC: CPT | Performed by: INTERNAL MEDICINE

## 2023-04-07 ENCOUNTER — LAB VISIT (OUTPATIENT)
Dept: LAB | Facility: HOSPITAL | Age: 48
End: 2023-04-07
Attending: INTERNAL MEDICINE
Payer: COMMERCIAL

## 2023-04-07 LAB
HAV IGM SERPL QL IA: NEGATIVE
HBV CORE IGM SERPL QL IA: POSITIVE
HBV SURFACE AG SERPL QL IA: NEGATIVE
HCV AB S/CO SERPL IA: NON REACTIVE
HCV AB SERPL QL IA: NORMAL
HIV 1+2 AB+HIV1 P24 AG SERPL QL IA: NON REACTIVE

## 2023-04-07 PROCEDURE — 83993 ASSAY FOR CALPROTECTIN FECAL: CPT | Performed by: INTERNAL MEDICINE

## 2023-04-10 ENCOUNTER — PATIENT MESSAGE (OUTPATIENT)
Dept: FAMILY MEDICINE | Facility: CLINIC | Age: 48
End: 2023-04-10
Payer: COMMERCIAL

## 2023-04-10 LAB
GAMMA INTERFERON BACKGROUND BLD IA-ACNC: 0.03 IU/ML
M TB IFN-G BLD-IMP: NEGATIVE
M TB IFN-G CD4+ BCKGRND COR BLD-ACNC: 0.05 IU/ML
M TB IFN-G CD4+CD8+ BCKGRND COR BLD-ACNC: 0.05 IU/ML
MITOGEN IGNF BLD-ACNC: >10 IU/ML
QUANTIFERON CRITERIA: NORMAL

## 2023-04-11 ENCOUNTER — PATIENT MESSAGE (OUTPATIENT)
Dept: ADMINISTRATIVE | Facility: HOSPITAL | Age: 48
End: 2023-04-11
Payer: COMMERCIAL

## 2023-04-11 ENCOUNTER — PATIENT MESSAGE (OUTPATIENT)
Dept: FAMILY MEDICINE | Facility: CLINIC | Age: 48
End: 2023-04-11
Payer: COMMERCIAL

## 2023-04-11 LAB — CALPROTECTIN STL-MCNT: 3985 UG/G (ref 0–120)

## 2023-04-13 ENCOUNTER — PATIENT MESSAGE (OUTPATIENT)
Dept: FAMILY MEDICINE | Facility: CLINIC | Age: 48
End: 2023-04-13
Payer: COMMERCIAL

## 2023-04-14 ENCOUNTER — LAB VISIT (OUTPATIENT)
Dept: LAB | Facility: HOSPITAL | Age: 48
End: 2023-04-14
Attending: INTERNAL MEDICINE
Payer: COMMERCIAL

## 2023-04-14 DIAGNOSIS — K51.818 OTHER ULCERATIVE COLITIS WITH OTHER COMPLICATION: Primary | ICD-10-CM

## 2023-04-14 PROCEDURE — 87350 HEPATITIS BE AG IA: CPT | Performed by: INTERNAL MEDICINE

## 2023-04-14 PROCEDURE — 87517 HEPATITIS B DNA QUANT: CPT | Performed by: INTERNAL MEDICINE

## 2023-04-14 PROCEDURE — 36415 COLL VENOUS BLD VENIPUNCTURE: CPT | Performed by: INTERNAL MEDICINE

## 2023-04-14 PROCEDURE — 86706 HEP B SURFACE ANTIBODY: CPT | Performed by: INTERNAL MEDICINE

## 2023-04-14 PROCEDURE — 30000890 LABCORP MISCELLANEOUS TEST: Performed by: INTERNAL MEDICINE

## 2023-04-14 PROCEDURE — 86705 HEP B CORE ANTIBODY IGM: CPT | Performed by: INTERNAL MEDICINE

## 2023-04-15 LAB
HBV CORE IGM SERPL QL IA: POSITIVE
HBV E AG SERPL QL IA: NEGATIVE
LABCORP MISC TEST CODE: 6395
LABCORP MISC TEST NAME: NORMAL
LABCORP MISCELLANEOUS TEST: NORMAL

## 2023-04-16 LAB
HBV DNA SERPL NAA+PROBE-ACNC: NORMAL IU/ML
HBV DNA SERPL NAA+PROBE-LOG IU: NORMAL LOG10 IU/ML
REF LAB TEST REF RANGE: NORMAL

## 2023-04-20 ENCOUNTER — HOSPITAL ENCOUNTER (INPATIENT)
Facility: HOSPITAL | Age: 48
LOS: 4 days | Discharge: HOME | DRG: 385 | End: 2023-04-25
Attending: EMERGENCY MEDICINE | Admitting: INTERNAL MEDICINE
Payer: COMMERCIAL

## 2023-04-20 DIAGNOSIS — D64.9 ANEMIA, UNSPECIFIED TYPE: ICD-10-CM

## 2023-04-20 DIAGNOSIS — K51.911 ULCERATIVE COLITIS WITH RECTAL BLEEDING, UNSPECIFIED LOCATION: Primary | ICD-10-CM

## 2023-04-20 DIAGNOSIS — K51.90 ULCERATIVE COLITIS: ICD-10-CM

## 2023-04-20 DIAGNOSIS — R07.9 CHEST PAIN: ICD-10-CM

## 2023-04-20 PROBLEM — Z92.89: Status: ACTIVE | Noted: 2023-04-20

## 2023-04-20 PROBLEM — E86.0 DEHYDRATION: Status: ACTIVE | Noted: 2023-04-20

## 2023-04-20 PROBLEM — Z86.19 HISTORY OF CLOSTRIDIOIDES DIFFICILE COLITIS: Chronic | Status: ACTIVE | Noted: 2023-04-20

## 2023-04-20 PROBLEM — K02.9 DENTAL CARIES: Chronic | Status: ACTIVE | Noted: 2023-04-20

## 2023-04-20 PROBLEM — K92.2 GI BLEED: Status: ACTIVE | Noted: 2023-04-20

## 2023-04-20 LAB
ALBUMIN SERPL BCP-MCNC: 2 G/DL (ref 3.5–5.2)
ALP SERPL-CCNC: 82 U/L (ref 55–135)
ALT SERPL W/O P-5'-P-CCNC: 76 U/L (ref 10–44)
ANION GAP SERPL CALC-SCNC: 15 MMOL/L (ref 8–16)
AST SERPL-CCNC: 29 U/L (ref 10–40)
BASOPHILS # BLD AUTO: 0.02 K/UL (ref 0–0.2)
BASOPHILS NFR BLD: 0.2 % (ref 0–1.9)
BILIRUB SERPL-MCNC: 0.7 MG/DL (ref 0.1–1)
BILIRUB UR QL STRIP: NEGATIVE
BUN SERPL-MCNC: 26 MG/DL (ref 6–20)
CALCIUM SERPL-MCNC: 7.9 MG/DL (ref 8.7–10.5)
CHLORIDE SERPL-SCNC: 106 MMOL/L (ref 95–110)
CLARITY UR: CLEAR
CO2 SERPL-SCNC: 18 MMOL/L (ref 23–29)
COLOR UR: YELLOW
CREAT SERPL-MCNC: 1.2 MG/DL (ref 0.5–1.4)
DIFFERENTIAL METHOD: ABNORMAL
EOSINOPHIL # BLD AUTO: 0.2 K/UL (ref 0–0.5)
EOSINOPHIL NFR BLD: 1.7 % (ref 0–8)
ERYTHROCYTE [DISTWIDTH] IN BLOOD BY AUTOMATED COUNT: 15.9 % (ref 11.5–14.5)
EST. GFR  (NO RACE VARIABLE): >60 ML/MIN/1.73 M^2
FERRITIN SERPL-MCNC: 108 NG/ML (ref 20–300)
FOLATE SERPL-MCNC: 8.5 NG/ML (ref 4–24)
GLUCOSE SERPL-MCNC: 61 MG/DL (ref 70–110)
GLUCOSE SERPL-MCNC: 72 MG/DL (ref 70–110)
GLUCOSE SERPL-MCNC: 76 MG/DL (ref 70–110)
GLUCOSE SERPL-MCNC: 79 MG/DL (ref 70–110)
GLUCOSE SERPL-MCNC: 84 MG/DL (ref 70–110)
GLUCOSE UR QL STRIP: NEGATIVE
HCT VFR BLD AUTO: 30.1 % (ref 40–54)
HGB BLD-MCNC: 9.6 G/DL (ref 14–18)
HGB UR QL STRIP: NEGATIVE
IMM GRANULOCYTES # BLD AUTO: 0.05 K/UL (ref 0–0.04)
IMM GRANULOCYTES NFR BLD AUTO: 0.5 % (ref 0–0.5)
IRON SERPL-MCNC: 5 UG/DL (ref 45–160)
KETONES UR QL STRIP: ABNORMAL
LEUKOCYTE ESTERASE UR QL STRIP: NEGATIVE
LIPASE SERPL-CCNC: 15 U/L (ref 4–60)
LYMPHOCYTES # BLD AUTO: 1.4 K/UL (ref 1–4.8)
LYMPHOCYTES NFR BLD: 14.8 % (ref 18–48)
MCH RBC QN AUTO: 29 PG (ref 27–31)
MCHC RBC AUTO-ENTMCNC: 31.9 G/DL (ref 32–36)
MCV RBC AUTO: 91 FL (ref 82–98)
MONOCYTES # BLD AUTO: 1.5 K/UL (ref 0.3–1)
MONOCYTES NFR BLD: 15.7 % (ref 4–15)
NEUTROPHILS # BLD AUTO: 6.4 K/UL (ref 1.8–7.7)
NEUTROPHILS NFR BLD: 67.1 % (ref 38–73)
NITRITE UR QL STRIP: NEGATIVE
NRBC BLD-RTO: 0 /100 WBC
OB PNL STL: POSITIVE
PH UR STRIP: 6 [PH] (ref 5–8)
PLATELET # BLD AUTO: 440 K/UL (ref 150–450)
PMV BLD AUTO: 9 FL (ref 9.2–12.9)
POTASSIUM SERPL-SCNC: 4.7 MMOL/L (ref 3.5–5.1)
PROT SERPL-MCNC: 5.8 G/DL (ref 6–8.4)
PROT UR QL STRIP: NEGATIVE
RBC # BLD AUTO: 3.31 M/UL (ref 4.6–6.2)
SARS-COV-2 RDRP RESP QL NAA+PROBE: NEGATIVE
SATURATED IRON: 4 % (ref 20–50)
SODIUM SERPL-SCNC: 139 MMOL/L (ref 136–145)
SP GR UR STRIP: >1.03 (ref 1–1.03)
TOTAL IRON BINDING CAPACITY: 125 UG/DL (ref 250–450)
TRANSFERRIN SERPL-MCNC: 89 MG/DL (ref 200–375)
URN SPEC COLLECT METH UR: ABNORMAL
UROBILINOGEN UR STRIP-ACNC: NEGATIVE EU/DL
VIT B12 SERPL-MCNC: 1104 PG/ML (ref 210–950)
WBC # BLD AUTO: 9.58 K/UL (ref 3.9–12.7)
WBC #/AREA STL HPF: NORMAL /[HPF]

## 2023-04-20 PROCEDURE — 63600175 PHARM REV CODE 636 W HCPCS: Performed by: EMERGENCY MEDICINE

## 2023-04-20 PROCEDURE — 82728 ASSAY OF FERRITIN: CPT | Performed by: EMERGENCY MEDICINE

## 2023-04-20 PROCEDURE — 96375 TX/PRO/DX INJ NEW DRUG ADDON: CPT

## 2023-04-20 PROCEDURE — 99285 EMERGENCY DEPT VISIT HI MDM: CPT | Mod: 25

## 2023-04-20 PROCEDURE — 83690 ASSAY OF LIPASE: CPT | Performed by: EMERGENCY MEDICINE

## 2023-04-20 PROCEDURE — 25500020 PHARM REV CODE 255: Performed by: EMERGENCY MEDICINE

## 2023-04-20 PROCEDURE — 87046 STOOL CULTR AEROBIC BACT EA: CPT | Performed by: EMERGENCY MEDICINE

## 2023-04-20 PROCEDURE — U0002 COVID-19 LAB TEST NON-CDC: HCPCS | Performed by: EMERGENCY MEDICINE

## 2023-04-20 PROCEDURE — 82607 VITAMIN B-12: CPT | Performed by: EMERGENCY MEDICINE

## 2023-04-20 PROCEDURE — 85025 COMPLETE CBC W/AUTO DIFF WBC: CPT | Performed by: EMERGENCY MEDICINE

## 2023-04-20 PROCEDURE — 87209 SMEAR COMPLEX STAIN: CPT | Performed by: EMERGENCY MEDICINE

## 2023-04-20 PROCEDURE — 87449 NOS EACH ORGANISM AG IA: CPT | Performed by: EMERGENCY MEDICINE

## 2023-04-20 PROCEDURE — 87045 FECES CULTURE AEROBIC BACT: CPT | Performed by: EMERGENCY MEDICINE

## 2023-04-20 PROCEDURE — 25000003 PHARM REV CODE 250: Performed by: INTERNAL MEDICINE

## 2023-04-20 PROCEDURE — 80053 COMPREHEN METABOLIC PANEL: CPT | Performed by: EMERGENCY MEDICINE

## 2023-04-20 PROCEDURE — 96365 THER/PROPH/DIAG IV INF INIT: CPT

## 2023-04-20 PROCEDURE — 84466 ASSAY OF TRANSFERRIN: CPT | Performed by: EMERGENCY MEDICINE

## 2023-04-20 PROCEDURE — 63600175 PHARM REV CODE 636 W HCPCS: Performed by: INTERNAL MEDICINE

## 2023-04-20 PROCEDURE — 81003 URINALYSIS AUTO W/O SCOPE: CPT | Performed by: EMERGENCY MEDICINE

## 2023-04-20 PROCEDURE — 87177 OVA AND PARASITES SMEARS: CPT | Performed by: EMERGENCY MEDICINE

## 2023-04-20 PROCEDURE — G0378 HOSPITAL OBSERVATION PER HR: HCPCS

## 2023-04-20 PROCEDURE — 89055 LEUKOCYTE ASSESSMENT FECAL: CPT | Performed by: EMERGENCY MEDICINE

## 2023-04-20 PROCEDURE — 82962 GLUCOSE BLOOD TEST: CPT

## 2023-04-20 PROCEDURE — 82272 OCCULT BLD FECES 1-3 TESTS: CPT | Performed by: EMERGENCY MEDICINE

## 2023-04-20 PROCEDURE — 96361 HYDRATE IV INFUSION ADD-ON: CPT

## 2023-04-20 PROCEDURE — 25000003 PHARM REV CODE 250: Performed by: EMERGENCY MEDICINE

## 2023-04-20 PROCEDURE — 36415 COLL VENOUS BLD VENIPUNCTURE: CPT | Performed by: EMERGENCY MEDICINE

## 2023-04-20 PROCEDURE — 82746 ASSAY OF FOLIC ACID SERUM: CPT | Performed by: EMERGENCY MEDICINE

## 2023-04-20 RX ORDER — LANOLIN ALCOHOL/MO/W.PET/CERES
800 CREAM (GRAM) TOPICAL
Status: DISCONTINUED | OUTPATIENT
Start: 2023-04-20 | End: 2023-04-25 | Stop reason: HOSPADM

## 2023-04-20 RX ORDER — SODIUM,POTASSIUM PHOSPHATES 280-250MG
2 POWDER IN PACKET (EA) ORAL
Status: DISCONTINUED | OUTPATIENT
Start: 2023-04-20 | End: 2023-04-25 | Stop reason: HOSPADM

## 2023-04-20 RX ORDER — GLUCAGON 1 MG
1 KIT INJECTION
Status: DISCONTINUED | OUTPATIENT
Start: 2023-04-20 | End: 2023-04-20

## 2023-04-20 RX ORDER — IBUPROFEN 200 MG
24 TABLET ORAL
Status: DISCONTINUED | OUTPATIENT
Start: 2023-04-20 | End: 2023-04-25 | Stop reason: HOSPADM

## 2023-04-20 RX ORDER — METOPROLOL SUCCINATE 50 MG/1
50 TABLET, EXTENDED RELEASE ORAL DAILY
Status: DISCONTINUED | OUTPATIENT
Start: 2023-04-21 | End: 2023-04-25 | Stop reason: HOSPADM

## 2023-04-20 RX ORDER — ATORVASTATIN CALCIUM 20 MG/1
20 TABLET, FILM COATED ORAL NIGHTLY
Status: DISCONTINUED | OUTPATIENT
Start: 2023-04-20 | End: 2023-04-25 | Stop reason: HOSPADM

## 2023-04-20 RX ORDER — IBUPROFEN 200 MG
16 TABLET ORAL
Status: DISCONTINUED | OUTPATIENT
Start: 2023-04-20 | End: 2023-04-20

## 2023-04-20 RX ORDER — NALOXONE HCL 0.4 MG/ML
0.02 VIAL (ML) INJECTION
Status: DISCONTINUED | OUTPATIENT
Start: 2023-04-20 | End: 2023-04-25 | Stop reason: HOSPADM

## 2023-04-20 RX ORDER — ONDANSETRON 2 MG/ML
4 INJECTION INTRAMUSCULAR; INTRAVENOUS
Status: COMPLETED | OUTPATIENT
Start: 2023-04-20 | End: 2023-04-20

## 2023-04-20 RX ORDER — SODIUM CHLORIDE 0.9 % (FLUSH) 0.9 %
10 SYRINGE (ML) INJECTION EVERY 6 HOURS PRN
Status: DISCONTINUED | OUTPATIENT
Start: 2023-04-20 | End: 2023-04-25 | Stop reason: HOSPADM

## 2023-04-20 RX ORDER — ZOLPIDEM TARTRATE 5 MG/1
10 TABLET ORAL NIGHTLY PRN
Status: DISCONTINUED | OUTPATIENT
Start: 2023-04-20 | End: 2023-04-25 | Stop reason: HOSPADM

## 2023-04-20 RX ORDER — IBUPROFEN 200 MG
24 TABLET ORAL
Status: DISCONTINUED | OUTPATIENT
Start: 2023-04-20 | End: 2023-04-20

## 2023-04-20 RX ORDER — FAMOTIDINE 20 MG/1
40 TABLET, FILM COATED ORAL NIGHTLY
Status: DISCONTINUED | OUTPATIENT
Start: 2023-04-20 | End: 2023-04-24

## 2023-04-20 RX ORDER — INSULIN ASPART 100 [IU]/ML
1-10 INJECTION, SOLUTION INTRAVENOUS; SUBCUTANEOUS
Status: DISCONTINUED | OUTPATIENT
Start: 2023-04-20 | End: 2023-04-20

## 2023-04-20 RX ORDER — ONDANSETRON 4 MG/1
4 TABLET, ORALLY DISINTEGRATING ORAL
COMMUNITY
Start: 2023-04-11 | End: 2023-05-30

## 2023-04-20 RX ORDER — HYDROCODONE BITARTRATE AND ACETAMINOPHEN 5; 325 MG/1; MG/1
1 TABLET ORAL EVERY 6 HOURS PRN
Status: DISCONTINUED | OUTPATIENT
Start: 2023-04-20 | End: 2023-04-25 | Stop reason: HOSPADM

## 2023-04-20 RX ORDER — HYDROMORPHONE HYDROCHLORIDE 1 MG/ML
0.5 INJECTION, SOLUTION INTRAMUSCULAR; INTRAVENOUS; SUBCUTANEOUS
Status: COMPLETED | OUTPATIENT
Start: 2023-04-20 | End: 2023-04-20

## 2023-04-20 RX ORDER — MORPHINE SULFATE 2 MG/ML
2 INJECTION, SOLUTION INTRAMUSCULAR; INTRAVENOUS EVERY 6 HOURS PRN
Status: DISCONTINUED | OUTPATIENT
Start: 2023-04-20 | End: 2023-04-25 | Stop reason: HOSPADM

## 2023-04-20 RX ORDER — GLUCAGON 1 MG
1 KIT INJECTION
Status: DISCONTINUED | OUTPATIENT
Start: 2023-04-20 | End: 2023-04-25 | Stop reason: HOSPADM

## 2023-04-20 RX ORDER — BUDESONIDE 3 MG/1
9 CAPSULE, COATED PELLETS ORAL DAILY
COMMUNITY
Start: 2023-04-07 | End: 2023-11-17

## 2023-04-20 RX ORDER — ACETAMINOPHEN 325 MG/1
650 TABLET ORAL EVERY 4 HOURS PRN
Status: DISCONTINUED | OUTPATIENT
Start: 2023-04-20 | End: 2023-04-25 | Stop reason: HOSPADM

## 2023-04-20 RX ORDER — IBUPROFEN 200 MG
16 TABLET ORAL
Status: DISCONTINUED | OUTPATIENT
Start: 2023-04-20 | End: 2023-04-25 | Stop reason: HOSPADM

## 2023-04-20 RX ORDER — SODIUM CHLORIDE, SODIUM LACTATE, POTASSIUM CHLORIDE, CALCIUM CHLORIDE 600; 310; 30; 20 MG/100ML; MG/100ML; MG/100ML; MG/100ML
INJECTION, SOLUTION INTRAVENOUS CONTINUOUS
Status: DISCONTINUED | OUTPATIENT
Start: 2023-04-20 | End: 2023-04-25 | Stop reason: HOSPADM

## 2023-04-20 RX ADMIN — HYDROCODONE BITARTRATE AND ACETAMINOPHEN 1 TABLET: 5; 325 TABLET ORAL at 09:04

## 2023-04-20 RX ADMIN — ONDANSETRON 4 MG: 2 INJECTION INTRAMUSCULAR; INTRAVENOUS at 12:04

## 2023-04-20 RX ADMIN — SODIUM CHLORIDE 125 MG: 9 INJECTION, SOLUTION INTRAVENOUS at 09:04

## 2023-04-20 RX ADMIN — SODIUM CHLORIDE, SODIUM LACTATE, POTASSIUM CHLORIDE, AND CALCIUM CHLORIDE: .6; .31; .03; .02 INJECTION, SOLUTION INTRAVENOUS at 09:04

## 2023-04-20 RX ADMIN — Medication 16 G: at 09:04

## 2023-04-20 RX ADMIN — IOHEXOL 100 ML: 350 INJECTION, SOLUTION INTRAVENOUS at 02:04

## 2023-04-20 RX ADMIN — ATORVASTATIN CALCIUM 20 MG: 20 TABLET, FILM COATED ORAL at 09:04

## 2023-04-20 RX ADMIN — METHYLPREDNISOLONE SODIUM SUCCINATE 40 MG: 40 INJECTION, POWDER, FOR SOLUTION INTRAMUSCULAR; INTRAVENOUS at 05:04

## 2023-04-20 RX ADMIN — HYDROMORPHONE HYDROCHLORIDE 0.5 MG: 0.5 INJECTION, SOLUTION INTRAMUSCULAR; INTRAVENOUS; SUBCUTANEOUS at 05:04

## 2023-04-20 RX ADMIN — FAMOTIDINE 40 MG: 20 TABLET ORAL at 09:04

## 2023-04-20 RX ADMIN — SODIUM CHLORIDE 1000 ML: 0.9 INJECTION, SOLUTION INTRAVENOUS at 12:04

## 2023-04-20 RX ADMIN — ZOLPIDEM TARTRATE 10 MG: 5 TABLET, COATED ORAL at 09:04

## 2023-04-20 NOTE — H&P
[facility] - Emergency Dept  Hospital Medicine  History & Physical    Patient Name: Jacoby Jean-Baptiste  MRN: [id number]  Patient Class: OP- Observation  Admission Date: 4/20/2023  Attending Physician: Moon Armas MD   Primary Care Provider: Hunter Aguilar III, MD         Patient information was obtained from patient, past medical records and ER records.     Subjective:     Principal Problem:Ulcerative colitis    Chief Complaint:   Chief Complaint   Patient presents with    General Illness     Since January, body aches/cough/doesn't feel well        HPI: Mr. Jean-Baptiste is a 48-year-old male who presented to the ED with chief complaint of diarrhea.  Patient states he has not been feeling well since January 2023, he was diagnosed with C diff, status post 2 courses of Dificid, 2 courses of vancomycin, on 3/21/23 underwent fecal transplantation at [facility].  Patient with history of ulcerative colitis, followed by Dr. Jean, states in the last week he was started on budesonide, he is awaiting insurance authorization for initiation of biologics.  Unclear family history of IBD.  He states he continues to have diarrhea, quantified as 4-5 bowel movements per day, liquid, specks of blood seen, abdominal pain, central and lower, intermittent, worsened by eating, nausea with emesis.  States he has not been eating much over the last week.  Subjective chills, does not check temperature, no chest pain, shortness a of breath, urinary symptoms, skin rashes/lesions.  Current smoker 1 pack per day.  In the ED afebrile with T-max 97.7°, vital stable.  Labs with WBC 9.58, hemoglobin 9.6, BUN/creatinine 26/1.2, FOBT positive, chest x-ray clear, CT abdomen and pelvis with diffuse colonic wall thickening from proximal transverse colon to splenic flexure, descending and sigmoid colon.  He received Dilaudid 0.5 mg, Solu-Medrol 40 mg and 1 L fluid bolus.  Case discussed with ED provider who requested  admission, states he discussed with on-call Gastroenterology, strongly advise against antibiotics with history of C diff, recommends Solu-Medrol 40 mg q.12h hours, will be seen in consultation.  Plan of care discussed with patient.  No visitors at bedside.      Past Medical History:   Diagnosis Date    Diabetes mellitus 01/2022    Hyperlipidemia 2015    Hypertension 2015    Insomnia 2015       Past Surgical History:   Procedure Laterality Date    CARDIAC ELECTROPHYSIOLOGY MAPPING AND ABLATION  2017    SVT    CHOLECYSTECTOMY  2011    COLONOSCOPY N/A 5/3/2022    Procedure: COLONOSCOPY;  Surgeon: Shan Jean III, MD;  Location: [facility] ENDO;  Service: Endoscopy;  Laterality: N/A;    COLONOSCOPY WITH FECAL MICROBIOTA TRANSFER N/A 3/21/2023    Procedure: COLONOSCOPY, WITH FECAL MICROBIOTA TRANSFER;  Surgeon: David Millan MD;  Location: [facility] ENDO;  Service: Endoscopy;  Laterality: N/A;    GANGLION CYST EXCISION Left 1992    UMBILICAL HERNIA REPAIR  2011    with mesh       Review of patient's allergies indicates:  No Known Allergies    Current Facility-Administered Medications on File Prior to Encounter   Medication    lactated ringers infusion     Current Outpatient Medications on File Prior to Encounter   Medication Sig    famotidine (PEPCID) 40 MG tablet Take 40 mg by mouth every evening.    metFORMIN (GLUCOPHAGE-XR) 500 MG ER 24hr tablet TAKE 1 TABLET(500 MG) BY MOUTH TWICE DAILY WITH MEALS (Patient taking differently: Take 500 mg by mouth 2 (two) times daily with meals.)    metoprolol succinate (TOPROL-XL) 50 MG 24 hr tablet TAKE 1 TABLET(50 MG) BY MOUTH EVERY DAY (Patient taking differently: Take 50 mg by mouth once daily.)    potassium chloride SA (K-DUR,KLOR-CON) 20 MEQ tablet Take 1 tablet (20 mEq total) by mouth once daily.    semaglutide (OZEMPIC) 1 mg/dose (4 mg/3 mL) INJECT 1 MG UNDER THE SKIN EVERY 7 DAYS (Patient taking differently: Inject 1 mg into the skin every 7 days.)     sertraline (ZOLOFT) 50 MG tablet Take 1 tablet (50 mg total) by mouth once daily.    simvastatin (ZOCOR) 20 MG tablet TAKE 1 TABLET(20 MG) BY MOUTH EVERY NIGHT (Patient taking differently: Take 20 mg by mouth every evening.)    zolpidem (AMBIEN) 10 mg Tab Take 1 tablet (10 mg total) by mouth nightly as needed (insomnia).    balsalazide (COLAZAL) 750 mg capsule Take 750 mg by mouth 3 (three) times daily.    budesonide (ENTOCORT EC) 3 mg capsule Take 9 mg by mouth once daily.    diphenoxylate-atropine 2.5-0.025 mg (LOMOTIL) 2.5-0.025 mg per tablet Take 1-2 tablets by mouth 4 times daily as needed for diarrhea. (Patient not taking: Reported on 3/20/2023)    magnesium oxide (MAG-OX) 400 mg (241.3 mg magnesium) tablet Take 400 mg by mouth once daily. Over the counter    ondansetron (ZOFRAN) 4 MG tablet Take by mouth.    ondansetron (ZOFRAN) 4 MG tablet Take 1 tablet (4 mg total) by mouth every 6 (six) hours as needed for Nausea. (Patient not taking: Reported on 3/20/2023)    ondansetron (ZOFRAN-ODT) 4 MG TbDL Take 4 mg by mouth every 4 to 6 hours as needed.    SUTAB 1.479-0.188- 0.225 gram tablet SMARTSI Tablet(s) By Mouth As Directed     Family History       Problem Relation (Age of Onset)    Asthma Mother          Tobacco Use    Smoking status: Every Day     Packs/day: 1.00     Years: 30.00     Pack years: 30.00     Types: Cigarettes    Smokeless tobacco: Never    Tobacco comments:     DO NOT SMOKE DOS   Substance and Sexual Activity    Alcohol use: Yes     Comment: ocass    Drug use: Not Currently    Sexual activity: Not Currently     Review of Systems   Constitutional:  Positive for chills. Negative for fever.   HENT:  Negative for congestion.    Cardiovascular:  Negative for leg swelling.   Gastrointestinal:  Positive for abdominal pain, blood in stool, diarrhea, nausea and vomiting.   Genitourinary:  Negative for difficulty urinating and dysuria.   Neurological:  Positive for weakness.    Psychiatric/Behavioral:  Negative for confusion.    Objective:     Vital Signs (Most Recent):  Temp: 97.7 °F (36.5 °C) (04/20/23 1054)  Pulse: 84 (04/20/23 1639)  Resp: 18 (04/20/23 1712)  BP: 126/66 (04/20/23 1630)  SpO2: 97 % (04/20/23 1639) Vital Signs (24h Range):  Temp:  [97.7 °F (36.5 °C)] 97.7 °F (36.5 °C)  Pulse:  [82-92] 84  Resp:  [18] 18  SpO2:  [95 %-100 %] 97 %  BP: (104-130)/(55-75) 126/66     Weight: 99.8 kg (220 lb)  Body mass index is 36.61 kg/m².    Physical Exam  Vitals and nursing note reviewed.   Constitutional:       Appearance: He is obese. He is not toxic-appearing or diaphoretic.      Comments: Appears somewhat unkept, obese, lying in stretcher, cooperative   HENT:      Head: Normocephalic and atraumatic.      Mouth/Throat:      Mouth: Mucous membranes are moist.      Comments: Multiple and severe dental caries, no oral thrush, no oral ulcer  Eyes:      General:         Right eye: No discharge.         Left eye: No discharge.      Conjunctiva/sclera: Conjunctivae normal.   Cardiovascular:      Rate and Rhythm: Normal rate and regular rhythm.   Pulmonary:      Effort: No respiratory distress.      Breath sounds: Normal breath sounds. No stridor. No wheezing or rhonchi.      Comments: On room air  Abdominal:      General: Bowel sounds are normal. There is no distension.      Palpations: Abdomen is soft.      Tenderness: There is abdominal tenderness (Tenderness to palpate left lower abdomen).   Genitourinary:     Comments: Urinal at bedside with yellow urine seen  Musculoskeletal:      Cervical back: Neck supple.   Skin:     Comments: Dark discoloration soles of feet   Neurological:      General: No focal deficit present.      Mental Status: He is alert and oriented to person, place, and time. Mental status is at baseline.   Psychiatric:         Mood and Affect: Mood normal.           Significant Labs: BMP:   Recent Labs   Lab 04/20/23  1321   GLU 79      K 4.7      CO2 18*   BUN  26*   CREATININE 1.2   CALCIUM 7.9*     CBC:   Recent Labs   Lab 04/20/23  1321   WBC 9.58   HGB 9.6*   HCT 30.1*        CMP:   Recent Labs   Lab 04/20/23  1321      K 4.7      CO2 18*   GLU 79   BUN 26*   CREATININE 1.2   CALCIUM 7.9*   PROT 5.8*   ALBUMIN 2.0*   BILITOT 0.7   ALKPHOS 82   AST 29   ALT 76*   ANIONGAP 15     Cardiac Markers: No results for input(s): CKMB, MYOGLOBIN, BNP, TROPISTAT in the last 48 hours.  Coagulation: No results for input(s): PT, INR, APTT in the last 48 hours.  Lactic Acid: No results for input(s): LACTATE in the last 48 hours.  Magnesium: No results for input(s): MG in the last 48 hours.  POCT Glucose: No results for input(s): POCTGLUCOSE in the last 48 hours.  Prealbumin: No results for input(s): PREALBUMIN in the last 48 hours.  Respiratory Culture: No results for input(s): GSRESP, RESPIRATORYC in the last 48 hours.  Troponin: No results for input(s): TROPONINI, TROPONINIHS in the last 48 hours.  TSH:   Recent Labs   Lab 01/11/23  1110   TSH 1.510     Urine Culture: No results for input(s): LABURIN in the last 48 hours.  Urine Studies:   Recent Labs   Lab 04/20/23  1604   COLORU Yellow   APPEARANCEUA Clear   PHUR 6.0   SPECGRAV >1.030*   PROTEINUA Negative   GLUCUA Negative   KETONESU 2+*   BILIRUBINUA Negative   OCCULTUA Negative   NITRITE Negative   UROBILINOGEN Negative   LEUKOCYTESUR Negative       Significant Imaging: I have reviewed all pertinent imaging results/findings within the past 24 hours.    CT Abdomen Pelvis With Contrast    Result Date: 4/20/2023  CMS MANDATED QUALITY DATA - CT RADIATION  436 All CT scans at this facility utilize dose modulation, iterative reconstruction, and/or weight based dosing when appropriate to reduce radiation dose to as low as reasonably achievable. CT ABDOMEN PELVIS WITH IV CONTRAST CLINICAL HISTORY: 48 years Male LLQ abdominal pain COMPARISON: CT abdomen and pelvis January 11, 2023 FINDINGS: Lung bases are clear. Bone  window images show no acute or aggressive osseous abnormality. No focal hepatic lesion. Gallbladder is absent. No bile duct dilation. Spleen is unremarkable. Left upper quadrant splenule. Pancreas is within normal limits. Duodenal diverticulum noted. No adrenal lesion. Multiple bilateral renal calculi including nonobstructing 6 mm left mid pole renal calculus, nonobstructing 6 mm left lower pole renal calculus, nonobstructing 5 mm right lower pole renal calculus. Ureters are normal in caliber. Urinary bladder is within normal limits. Stomach is largely collapsed. No evidence of small bowel obstruction. Normal-size appendix containing small calcified appendicoliths. Diffuse colonic wall thickening beginning at the proximal transverse colon extending through the splenic flexure and descending and sigmoid colon where there is mild surrounding pericolonic inflammatory stranding. No evidence of perforation. No intra-abdominal abscess. Calcified focus of fat necrosis left lower quadrant, stable. No pathologically enlarged left limits. Scattered aortoiliac atherosclerotic calcification. IMPRESSION: Colonic wall thickening extending from the transverse colon through the rectum, with descending and sigmoid colon pericolonic stranding. Findings are consistent with acute infectious or inflammatory colitis in the appropriate clinical setting. Bilateral nonobstructing nephrolithiasis. Status post cholecystectomy. Electronically signed by:  Rickie Brown MD  4/20/2023 3:23 PM CDT Workstation: [id number]    X-Ray Chest AP Portable    Result Date: 4/20/2023  XR CHEST 1 VIEW CLINICAL HISTORY: 48 years Male Congestion General Illness (Since January, body aches/cough/doesn't feel well) COMPARISON: August 20, 2016 FINDINGS: Cardiac silhouette size is within normal limits for portable technique. Patient is rotated. No airspace consolidation. No pleural effusion or pneumothorax. No acute osseous abnormality. IMPRESSION: No acute  pulmonary pathology. Electronically signed by:  Rickie Brown MD  4/20/2023 1:05 PM CDT Workstation: [id number]       Assessment/Plan:     Active Hospital Problems    Diagnosis    *Ulcerative colitis with acute flare    Dehydration    Lower GI bleed secondary to colitis    Dental caries    History of Clostridioides difficile colitis    Status post fecal microbiota transplant    Anemia    Bilateral nephrolithiasis, non obstructing    Anxiety    Hyperlipidemia    Type 2 diabetes mellitus without complication, without long-term current use of insulin    Insomnia    Morbid obesity    Tobacco use    History of supraventricular tachycardia    H/O cardiac radiofrequency ablation     Plan:  Admit observation, medical floor  Trial of full liquid (diabetic), advanced as tolerated  IV fluid hydration with LR at 100 cc/hour   Stool studies including stool WBC, stool culture and C diff  Solu-Medrol 40 mg q.12h as per GI recommendation, he was recently initiated on budesonide 9 mg prior to presentation   Check anemia labs including B12, folic acid, iron study, transfuse p.r.n.   Significant dental caries, needs outpatient dental follow-up   Current smoker, continue to reinforce smoking cessation counseling   Hold oral hypoglycemic, insulin sliding scale with Accu-Cheks, as needed hypoglycemic measures  P.r.n. antiemetic and analgesic as ordered  Electrolytes sliding scale repletion  A.m. labs ordered  Consult Gastroenterology  Further plan as per hospital course    VTE Risk Mitigation (From admission, onward)    None             On 04/20/2023, patient should be placed in hospital observation services under my care.        Moon Arams MD  Department of Hospital Medicine  [facility] - Emergency Dept

## 2023-04-20 NOTE — SUBJECTIVE & OBJECTIVE
Past Medical History:   Diagnosis Date    Diabetes mellitus 01/2022    Hyperlipidemia 2015    Hypertension 2015    Insomnia 2015       Past Surgical History:   Procedure Laterality Date    CARDIAC ELECTROPHYSIOLOGY MAPPING AND ABLATION  2017    SVT    CHOLECYSTECTOMY  2011    COLONOSCOPY N/A 5/3/2022    Procedure: COLONOSCOPY;  Surgeon: Shan Jean III, MD;  Location: [facility] ENDO;  Service: Endoscopy;  Laterality: N/A;    COLONOSCOPY WITH FECAL MICROBIOTA TRANSFER N/A 3/21/2023    Procedure: COLONOSCOPY, WITH FECAL MICROBIOTA TRANSFER;  Surgeon: David Millan MD;  Location: [facility] ENDO;  Service: Endoscopy;  Laterality: N/A;    GANGLION CYST EXCISION Left 1992    UMBILICAL HERNIA REPAIR  2011    with mesh       Review of patient's allergies indicates:  No Known Allergies    Current Facility-Administered Medications on File Prior to Encounter   Medication    lactated ringers infusion     Current Outpatient Medications on File Prior to Encounter   Medication Sig    famotidine (PEPCID) 40 MG tablet Take 40 mg by mouth every evening.    metFORMIN (GLUCOPHAGE-XR) 500 MG ER 24hr tablet TAKE 1 TABLET(500 MG) BY MOUTH TWICE DAILY WITH MEALS (Patient taking differently: Take 500 mg by mouth 2 (two) times daily with meals.)    metoprolol succinate (TOPROL-XL) 50 MG 24 hr tablet TAKE 1 TABLET(50 MG) BY MOUTH EVERY DAY (Patient taking differently: Take 50 mg by mouth once daily.)    potassium chloride SA (K-DUR,KLOR-CON) 20 MEQ tablet Take 1 tablet (20 mEq total) by mouth once daily.    semaglutide (OZEMPIC) 1 mg/dose (4 mg/3 mL) INJECT 1 MG UNDER THE SKIN EVERY 7 DAYS (Patient taking differently: Inject 1 mg into the skin every 7 days.)    sertraline (ZOLOFT) 50 MG tablet Take 1 tablet (50 mg total) by mouth once daily.    simvastatin (ZOCOR) 20 MG tablet TAKE 1 TABLET(20 MG) BY MOUTH EVERY NIGHT (Patient taking differently: Take 20 mg by mouth every evening.)    zolpidem (AMBIEN) 10 mg Tab Take 1 tablet (10 mg total)  by mouth nightly as needed (insomnia).    balsalazide (COLAZAL) 750 mg capsule Take 750 mg by mouth 3 (three) times daily.    budesonide (ENTOCORT EC) 3 mg capsule Take 9 mg by mouth once daily.    diphenoxylate-atropine 2.5-0.025 mg (LOMOTIL) 2.5-0.025 mg per tablet Take 1-2 tablets by mouth 4 times daily as needed for diarrhea. (Patient not taking: Reported on 3/20/2023)    magnesium oxide (MAG-OX) 400 mg (241.3 mg magnesium) tablet Take 400 mg by mouth once daily. Over the counter    ondansetron (ZOFRAN) 4 MG tablet Take by mouth.    ondansetron (ZOFRAN) 4 MG tablet Take 1 tablet (4 mg total) by mouth every 6 (six) hours as needed for Nausea. (Patient not taking: Reported on 3/20/2023)    ondansetron (ZOFRAN-ODT) 4 MG TbDL Take 4 mg by mouth every 4 to 6 hours as needed.    SUTAB 1.479-0.188- 0.225 gram tablet SMARTSI Tablet(s) By Mouth As Directed     Family History       Problem Relation (Age of Onset)    Asthma Mother          Tobacco Use    Smoking status: Every Day     Packs/day: 1.00     Years: 30.00     Pack years: 30.00     Types: Cigarettes    Smokeless tobacco: Never    Tobacco comments:     DO NOT SMOKE DOS   Substance and Sexual Activity    Alcohol use: Yes     Comment: ocass    Drug use: Not Currently    Sexual activity: Not Currently     Review of Systems   Constitutional:  Positive for chills. Negative for fever.   HENT:  Negative for congestion.    Cardiovascular:  Negative for leg swelling.   Gastrointestinal:  Positive for abdominal pain, blood in stool, diarrhea, nausea and vomiting.   Genitourinary:  Negative for difficulty urinating and dysuria.   Neurological:  Positive for weakness.   Psychiatric/Behavioral:  Negative for confusion.    Objective:     Vital Signs (Most Recent):  Temp: 97.7 °F (36.5 °C) (23 1054)  Pulse: 84 (23 1639)  Resp: 18 (23 1712)  BP: 126/66 (23 1630)  SpO2: 97 % (23 1639) Vital Signs (24h Range):  Temp:  [97.7 °F (36.5 °C)] 97.7 °F  (36.5 °C)  Pulse:  [82-92] 84  Resp:  [18] 18  SpO2:  [95 %-100 %] 97 %  BP: (104-130)/(55-75) 126/66     Weight: 99.8 kg (220 lb)  Body mass index is 36.61 kg/m².    Physical Exam  Vitals and nursing note reviewed.   Constitutional:       Appearance: He is obese. He is not toxic-appearing or diaphoretic.      Comments: Appears somewhat unkept, obese, lying in stretcher, cooperative   HENT:      Head: Normocephalic and atraumatic.      Mouth/Throat:      Mouth: Mucous membranes are moist.      Comments: Multiple and severe dental caries, no oral thrush, no oral ulcer  Eyes:      General:         Right eye: No discharge.         Left eye: No discharge.      Conjunctiva/sclera: Conjunctivae normal.   Cardiovascular:      Rate and Rhythm: Normal rate and regular rhythm.   Pulmonary:      Effort: No respiratory distress.      Breath sounds: Normal breath sounds. No stridor. No wheezing or rhonchi.      Comments: On room air  Abdominal:      General: Bowel sounds are normal. There is no distension.      Palpations: Abdomen is soft.      Tenderness: There is abdominal tenderness (Tenderness to palpate left lower abdomen).   Genitourinary:     Comments: Urinal at bedside with yellow urine seen  Musculoskeletal:      Cervical back: Neck supple.   Skin:     Comments: Dark discoloration soles of feet   Neurological:      General: No focal deficit present.      Mental Status: He is alert and oriented to person, place, and time. Mental status is at baseline.   Psychiatric:         Mood and Affect: Mood normal.           Significant Labs: BMP:   Recent Labs   Lab 04/20/23  1321   GLU 79      K 4.7      CO2 18*   BUN 26*   CREATININE 1.2   CALCIUM 7.9*     CBC:   Recent Labs   Lab 04/20/23  1321   WBC 9.58   HGB 9.6*   HCT 30.1*        CMP:   Recent Labs   Lab 04/20/23  1321      K 4.7      CO2 18*   GLU 79   BUN 26*   CREATININE 1.2   CALCIUM 7.9*   PROT 5.8*   ALBUMIN 2.0*   BILITOT 0.7   ALKPHOS  82   AST 29   ALT 76*   ANIONGAP 15     Cardiac Markers: No results for input(s): CKMB, MYOGLOBIN, BNP, TROPISTAT in the last 48 hours.  Coagulation: No results for input(s): PT, INR, APTT in the last 48 hours.  Lactic Acid: No results for input(s): LACTATE in the last 48 hours.  Magnesium: No results for input(s): MG in the last 48 hours.  POCT Glucose: No results for input(s): POCTGLUCOSE in the last 48 hours.  Prealbumin: No results for input(s): PREALBUMIN in the last 48 hours.  Respiratory Culture: No results for input(s): GSRESP, RESPIRATORYC in the last 48 hours.  Troponin: No results for input(s): TROPONINI, TROPONINIHS in the last 48 hours.  TSH:   Recent Labs   Lab 01/11/23  1110   TSH 1.510     Urine Culture: No results for input(s): LABURIN in the last 48 hours.  Urine Studies:   Recent Labs   Lab 04/20/23  1604   COLORU Yellow   APPEARANCEUA Clear   PHUR 6.0   SPECGRAV >1.030*   PROTEINUA Negative   GLUCUA Negative   KETONESU 2+*   BILIRUBINUA Negative   OCCULTUA Negative   NITRITE Negative   UROBILINOGEN Negative   LEUKOCYTESUR Negative       Significant Imaging: I have reviewed all pertinent imaging results/findings within the past 24 hours.    CT Abdomen Pelvis With Contrast    Result Date: 4/20/2023  CMS MANDATED QUALITY DATA - CT RADIATION  436 All CT scans at this facility utilize dose modulation, iterative reconstruction, and/or weight based dosing when appropriate to reduce radiation dose to as low as reasonably achievable. CT ABDOMEN PELVIS WITH IV CONTRAST CLINICAL HISTORY: 48 years Male LLQ abdominal pain COMPARISON: CT abdomen and pelvis January 11, 2023 FINDINGS: Lung bases are clear. Bone window images show no acute or aggressive osseous abnormality. No focal hepatic lesion. Gallbladder is absent. No bile duct dilation. Spleen is unremarkable. Left upper quadrant splenule. Pancreas is within normal limits. Duodenal diverticulum noted. No adrenal lesion. Multiple bilateral renal calculi  including nonobstructing 6 mm left mid pole renal calculus, nonobstructing 6 mm left lower pole renal calculus, nonobstructing 5 mm right lower pole renal calculus. Ureters are normal in caliber. Urinary bladder is within normal limits. Stomach is largely collapsed. No evidence of small bowel obstruction. Normal-size appendix containing small calcified appendicoliths. Diffuse colonic wall thickening beginning at the proximal transverse colon extending through the splenic flexure and descending and sigmoid colon where there is mild surrounding pericolonic inflammatory stranding. No evidence of perforation. No intra-abdominal abscess. Calcified focus of fat necrosis left lower quadrant, stable. No pathologically enlarged left limits. Scattered aortoiliac atherosclerotic calcification. IMPRESSION: Colonic wall thickening extending from the transverse colon through the rectum, with descending and sigmoid colon pericolonic stranding. Findings are consistent with acute infectious or inflammatory colitis in the appropriate clinical setting. Bilateral nonobstructing nephrolithiasis. Status post cholecystectomy. Electronically signed by:  Rickie Brown MD  4/20/2023 3:23 PM CDT Workstation: [id number]    X-Ray Chest AP Portable    Result Date: 4/20/2023  XR CHEST 1 VIEW CLINICAL HISTORY: 48 years Male Congestion General Illness (Since January, body aches/cough/doesn't feel well) COMPARISON: August 20, 2016 FINDINGS: Cardiac silhouette size is within normal limits for portable technique. Patient is rotated. No airspace consolidation. No pleural effusion or pneumothorax. No acute osseous abnormality. IMPRESSION: No acute pulmonary pathology. Electronically signed by:  Rickie Brown MD  4/20/2023 1:05 PM CDT Workstation: [id number]

## 2023-04-20 NOTE — ED PROVIDER NOTES
Encounter Date: 4/20/2023       History     Chief Complaint   Patient presents with    General Illness     Since January, body aches/cough/doesn't feel well     48-year-old male presents complaining of abdominal pain for the past week, patient also reports that he has generalized body aches and does not feel well, patient has a history of C diff, diabetes, hypertension and hyperlipidemia patient reports that he is being followed by Dr. Dugan and that he has had persistent left lower quadrant discomfort for the past week patient feels like he is having sharp intermittent pains to the left lower quadrant patient rates pain as 7/10 patient reports pain is mostly while eating and reports that it alleviates somewhat between meals    Review of patient's allergies indicates:  No Known Allergies  Past Medical History:   Diagnosis Date    Diabetes mellitus 01/2022    Hyperlipidemia 2015    Hypertension 2015    Insomnia 2015     Past Surgical History:   Procedure Laterality Date    CARDIAC ELECTROPHYSIOLOGY MAPPING AND ABLATION  2017    SVT    CHOLECYSTECTOMY  2011    COLONOSCOPY N/A 5/3/2022    Procedure: COLONOSCOPY;  Surgeon: Shan Jean III, MD;  Location: [facility] ENDO;  Service: Endoscopy;  Laterality: N/A;    COLONOSCOPY WITH FECAL MICROBIOTA TRANSFER N/A 3/21/2023    Procedure: COLONOSCOPY, WITH FECAL MICROBIOTA TRANSFER;  Surgeon: David Millan MD;  Location: [facility] ENDO;  Service: Endoscopy;  Laterality: N/A;    GANGLION CYST EXCISION Left 1992    UMBILICAL HERNIA REPAIR  2011    with mesh     Family History   Problem Relation Age of Onset    Asthma Mother      Social History     Tobacco Use    Smoking status: Every Day     Packs/day: 1.00     Years: 30.00     Pack years: 30.00     Types: Cigarettes    Smokeless tobacco: Never    Tobacco comments:     DO NOT SMOKE DOS   Substance Use Topics    Alcohol use: Yes     Comment: ocass    Drug use: Not Currently     Review of Systems   Constitutional:  Negative for  fever.   HENT:  Negative for congestion, rhinorrhea, sore throat and trouble swallowing.    Eyes:  Negative for visual disturbance.   Respiratory:  Negative for cough, chest tightness, shortness of breath and wheezing.    Cardiovascular:  Negative for chest pain, palpitations and leg swelling.   Gastrointestinal:  Positive for abdominal pain and nausea. Negative for abdominal distention, constipation, diarrhea and vomiting.   Genitourinary:  Negative for difficulty urinating, dysuria, flank pain and frequency.   Musculoskeletal:  Negative for arthralgias, back pain, joint swelling and neck pain.   Skin:  Negative for color change and rash.   Neurological:  Negative for dizziness, syncope, speech difficulty, weakness, numbness and headaches.   All other systems reviewed and are negative.    Physical Exam     Initial Vitals [04/20/23 1054]   BP Pulse Resp Temp SpO2   120/62 92 18 97.7 °F (36.5 °C) 100 %      MAP       --         Physical Exam    Nursing note and vitals reviewed.  Constitutional: He appears well-developed and well-nourished. He is not diaphoretic. No distress.   HENT:   Head: Normocephalic and atraumatic.   Right Ear: External ear normal.   Left Ear: External ear normal.   Nose: Nose normal.   Mouth/Throat: Oropharynx is clear and moist. No oropharyngeal exudate.   Eyes: Conjunctivae and EOM are normal. Pupils are equal, round, and reactive to light. Right eye exhibits no discharge. Left eye exhibits no discharge. No scleral icterus.   Neck: Neck supple. No thyromegaly present. No tracheal deviation present. No JVD present.   Normal range of motion.  Cardiovascular:  Normal rate, regular rhythm, normal heart sounds and intact distal pulses.     Exam reveals no gallop and no friction rub.       No murmur heard.  Pulmonary/Chest: Breath sounds normal. No stridor. No respiratory distress. He has no wheezes. He has no rhonchi. He has no rales. He exhibits no tenderness.   Abdominal: Abdomen is soft. Bowel  sounds are normal. He exhibits no distension and no mass. There is abdominal tenderness.   Tenderness to palpation in the left lower quadrant no rebound or guarding no palpable masses There is no rebound and no guarding.   Musculoskeletal:         General: No tenderness or edema. Normal range of motion.      Cervical back: Normal range of motion and neck supple.     Lymphadenopathy:     He has no cervical adenopathy.   Neurological: He is alert and oriented to person, place, and time. He has normal strength. No cranial nerve deficit or sensory deficit.   Skin: Skin is warm and dry. No rash noted. No erythema.       ED Course   Procedures  Labs Reviewed   CBC W/ AUTO DIFFERENTIAL - Abnormal; Notable for the following components:       Result Value    RBC 3.31 (*)     Hemoglobin 9.6 (*)     Hematocrit 30.1 (*)     MCHC 31.9 (*)     RDW 15.9 (*)     MPV 9.0 (*)     Immature Grans (Abs) 0.05 (*)     Mono # 1.5 (*)     Lymph % 14.8 (*)     Mono % 15.7 (*)     All other components within normal limits   COMPREHENSIVE METABOLIC PANEL - Abnormal; Notable for the following components:    CO2 18 (*)     BUN 26 (*)     Calcium 7.9 (*)     Total Protein 5.8 (*)     Albumin 2.0 (*)     ALT 76 (*)     All other components within normal limits   URINALYSIS, REFLEX TO URINE CULTURE - Abnormal; Notable for the following components:    Specific Gravity, UA >1.030 (*)     Ketones, UA 2+ (*)     All other components within normal limits    Narrative:     Specimen Source->Urine   OCCULT BLOOD X 1, STOOL - Abnormal; Notable for the following components:    Occult Blood Positive (*)     All other components within normal limits   CULTURE, STOOL   CLOSTRIDIUM DIFFICILE   SARS-COV-2 RNA AMPLIFICATION, QUAL   LIPASE   WBC, STOOL   VITAMIN B12   FOLATE   IRON AND TIBC   FERRITIN   TRANSFERRIN   STOOL EXAM-OVA,CYSTS,PARASITES   HEMOGLOBIN A1C   POCT GLUCOSE MONITORING CONTINUOUS          Imaging Results              CT Abdomen Pelvis With  Contrast (Final result)  Result time 04/20/23 15:23:10      Final result by Rickie Brown MD (04/20/23 15:23:10)                   Narrative:    CMS MANDATED QUALITY DATA - CT RADIATION  436    All CT scans at this facility utilize dose modulation, iterative reconstruction, and/or weight based dosing when appropriate to reduce radiation dose to as low as reasonably achievable.    CT ABDOMEN PELVIS WITH IV CONTRAST    CLINICAL HISTORY:  48 years Male LLQ abdominal pain    COMPARISON: CT abdomen and pelvis January 11, 2023    FINDINGS: Lung bases are clear. Bone window images show no acute or aggressive osseous abnormality.    No focal hepatic lesion. Gallbladder is absent. No bile duct dilation. Spleen is unremarkable. Left upper quadrant splenule. Pancreas is within normal limits. Duodenal diverticulum noted. No adrenal lesion. Multiple bilateral renal calculi including nonobstructing 6 mm left mid pole renal calculus, nonobstructing 6 mm left lower pole renal calculus, nonobstructing 5 mm right lower pole renal calculus. Ureters are normal in caliber. Urinary bladder is within normal limits.    Stomach is largely collapsed. No evidence of small bowel obstruction. Normal-size appendix containing small calcified appendicoliths. Diffuse colonic wall thickening beginning at the proximal transverse colon extending through the splenic flexure and descending and sigmoid colon where there is mild surrounding pericolonic inflammatory stranding.    No evidence of perforation. No intra-abdominal abscess. Calcified focus of fat necrosis left lower quadrant, stable. No pathologically enlarged left limits. Scattered aortoiliac atherosclerotic calcification.    IMPRESSION:    Colonic wall thickening extending from the transverse colon through the rectum, with descending and sigmoid colon pericolonic stranding. Findings are consistent with acute infectious or inflammatory colitis in the appropriate clinical  setting.    Bilateral nonobstructing nephrolithiasis.    Status post cholecystectomy.    Electronically signed by:  Rickie Brown MD  4/20/2023 3:23 PM CDT Workstation: [id number]                                     X-Ray Chest AP Portable (Final result)  Result time 04/20/23 13:05:40      Final result by Rickie Brown MD (04/20/23 13:05:40)                   Narrative:    XR CHEST 1 VIEW    CLINICAL HISTORY:  48 years Male Congestion General Illness (Since January, body aches/cough/doesn't feel well)    COMPARISON: August 20, 2016    FINDINGS: Cardiac silhouette size is within normal limits for portable technique. Patient is rotated. No airspace consolidation. No pleural effusion or pneumothorax. No acute osseous abnormality.    IMPRESSION:    No acute pulmonary pathology.    Electronically signed by:  Rickie Brown MD  4/20/2023 1:05 PM CDT Workstation: [id number]                                     Medications   lactated ringers infusion (has no administration in time range)   glucose chewable tablet 16 g (has no administration in time range)   glucose chewable tablet 24 g (has no administration in time range)   dextrose 50% injection 12.5 g (has no administration in time range)   dextrose 50% injection 25 g (has no administration in time range)   glucagon (human recombinant) injection 1 mg (has no administration in time range)   insulin aspart U-100 pen 1-10 Units (has no administration in time range)   potassium bicarbonate disintegrating tablet 50 mEq (has no administration in time range)   potassium bicarbonate disintegrating tablet 35 mEq (has no administration in time range)   potassium bicarbonate disintegrating tablet 60 mEq (has no administration in time range)   magnesium oxide tablet 800 mg (has no administration in time range)   magnesium oxide tablet 800 mg (has no administration in time range)   potassium, sodium phosphates 280-160-250 mg packet 2 packet (has no administration in time range)    potassium, sodium phosphates 280-160-250 mg packet 2 packet (has no administration in time range)   potassium, sodium phosphates 280-160-250 mg packet 2 packet (has no administration in time range)   methylPREDNISolone sodium succinate injection 40 mg (has no administration in time range)   sodium chloride 0.9% bolus 1,000 mL 1,000 mL (0 mLs Intravenous Stopped 4/20/23 1330)   ondansetron injection 4 mg (4 mg Intravenous Given 4/20/23 1231)   iohexoL (OMNIPAQUE 350) injection 100 mL (100 mLs Intravenous Given 4/20/23 1445)   HYDROmorphone injection 0.5 mg (0.5 mg Intravenous Given 4/20/23 1712)   methylPREDNISolone sodium succinate injection 40 mg (40 mg Intravenous Given 4/20/23 1712)     Medical Decision Making:   History:   Old Medical Records: I decided to obtain old medical records.  Initial Assessment:   Emergent evaluation of a 48-year-old male presenting with abdominal pain differential diagnosis includes infection, obstruction, perforation, electrolyte abnormality, dehydration          Attending Attestation:             Attending ED Notes:   Patient having bloody bowel movements in the emergency department, patient has findings concerning for colitis on CT, patient's hemoglobin and hematocrit are stable and vital signs are stable, patient discussed with GI who recommends no antibiotic administration at this time patient to receive scheduled doses of steroids, patient consulted Internal Medicine for further evaluation and management with GI to follow    MDM    MDM    Patient presents for emergent evaluation of acute complaint that poses a possible threat to life and/or bodily function.    I may have ordered labs and personally reviewed them. If applicable, Labs significant for abnormalities noted above.    I may have ordered X-rays and personally reviewed them and reviewed the radiologist interpretation.  If applicable, Xray significant for findings noted above.    I may have ordered EKG and personally  reviewed it.  If applicable, EKG significant for findings noted above.    I may have ordered CT scan and personally reviewed it and reviewed the radiologist interpretation.  If applicable, CT significant for findings noted above.      Admission MDM  I discussed the patient presentation labs, ekg, X-rays, CT findings (if applicable) with the consultant(s)   Patient was managed in the ED with IV labs, meds, fluids (if applicable).    The response to treatment was noted with improved stabilization.    Patient required emergent consultation to Hospitalist for admission.    A dictation software program was used for this note.  Please expect some simple typographical  errors in this note.                      Clinical Impression:   Final diagnoses:  [K51.911] Ulcerative colitis with rectal bleeding, unspecified location (Primary)        ED Disposition Condition    Observation                 Butch Moya MD  04/20/23 7502

## 2023-04-20 NOTE — ED TRIAGE NOTES
Pt presented with multiple C/o abdominal pain when/ after eating. Occasional pain with out eating, HX of C. Diff since Jan. Pt mentioned chest pain that was dull last night

## 2023-04-20 NOTE — ED NOTES
Pt stated he thinks he can go pee. Pt was given a urine cup for clean catch with instructions on how to collect. Pt has water at bedside

## 2023-04-20 NOTE — HPI
Mr. Jean-Baptiste is a 48-year-old male who presented to the ED with chief complaint of diarrhea.  Patient states he has not been feeling well since January 2023, he was diagnosed with C diff, status post 2 courses of Dificid, 2 courses of vancomycin, on 3/21/23 underwent fecal transplantation at [facility].  Patient with history of ulcerative colitis, followed by Dr. Jean, states in the last week he was started on budesonide, he is awaiting insurance authorization for initiation of biologics.  Unclear family history of IBD.  He states he continues to have diarrhea, quantified as 4-5 bowel movements per day, liquid, specks of blood seen, abdominal pain, central and lower, intermittent, worsened by eating, nausea with emesis.  States he has not been eating much over the last week.  Subjective chills, does not check temperature, no chest pain, shortness a of breath, urinary symptoms, skin rashes/lesions.  Current smoker 1 pack per day.  In the ED afebrile with T-max 97.7°, vital stable.  Labs with WBC 9.58, hemoglobin 9.6, BUN/creatinine 26/1.2, FOBT positive, chest x-ray clear, CT abdomen and pelvis with diffuse colonic wall thickening from proximal transverse colon to splenic flexure, descending and sigmoid colon.  He received Dilaudid 0.5 mg, Solu-Medrol 40 mg and 1 L fluid bolus.  Case discussed with ED provider who requested admission, states he discussed with on-call Gastroenterology, strongly advise against antibiotics with history of C diff, recommends Solu-Medrol 40 mg q.12h hours, will be seen in consultation.  Plan of care discussed with patient.  No visitors at bedside.

## 2023-04-21 LAB
ALBUMIN SERPL BCP-MCNC: 2.2 G/DL (ref 3.5–5.2)
ALP SERPL-CCNC: 82 U/L (ref 55–135)
ALT SERPL W/O P-5'-P-CCNC: 60 U/L (ref 10–44)
ANION GAP SERPL CALC-SCNC: 12 MMOL/L (ref 8–16)
ANION GAP SERPL CALC-SCNC: 15 MMOL/L (ref 8–16)
ANION GAP SERPL CALC-SCNC: 15 MMOL/L (ref 8–16)
AST SERPL-CCNC: 18 U/L (ref 10–40)
BASOPHILS # BLD AUTO: 0 K/UL (ref 0–0.2)
BASOPHILS NFR BLD: 0 % (ref 0–1.9)
BILIRUB SERPL-MCNC: 0.7 MG/DL (ref 0.1–1)
BUN SERPL-MCNC: 18 MG/DL (ref 6–20)
BUN SERPL-MCNC: 21 MG/DL (ref 6–20)
BUN SERPL-MCNC: 21 MG/DL (ref 6–20)
C DIFF GDH STL QL: NEGATIVE
C DIFF TOX A+B STL QL IA: NEGATIVE
CALCIUM SERPL-MCNC: 8.3 MG/DL (ref 8.7–10.5)
CALCIUM SERPL-MCNC: 8.5 MG/DL (ref 8.7–10.5)
CALCIUM SERPL-MCNC: 8.5 MG/DL (ref 8.7–10.5)
CHLORIDE SERPL-SCNC: 105 MMOL/L (ref 95–110)
CHLORIDE SERPL-SCNC: 105 MMOL/L (ref 95–110)
CHLORIDE SERPL-SCNC: 108 MMOL/L (ref 95–110)
CO2 SERPL-SCNC: 18 MMOL/L (ref 23–29)
CO2 SERPL-SCNC: 18 MMOL/L (ref 23–29)
CO2 SERPL-SCNC: 19 MMOL/L (ref 23–29)
CREAT SERPL-MCNC: 0.8 MG/DL (ref 0.5–1.4)
CREAT SERPL-MCNC: 1 MG/DL (ref 0.5–1.4)
CREAT SERPL-MCNC: 1 MG/DL (ref 0.5–1.4)
DIFFERENTIAL METHOD: ABNORMAL
EOSINOPHIL # BLD AUTO: 0 K/UL (ref 0–0.5)
EOSINOPHIL NFR BLD: 0 % (ref 0–8)
ERYTHROCYTE [DISTWIDTH] IN BLOOD BY AUTOMATED COUNT: 16 % (ref 11.5–14.5)
EST. GFR  (NO RACE VARIABLE): >60 ML/MIN/1.73 M^2
ESTIMATED AVG GLUCOSE: 91 MG/DL (ref 68–131)
GLUCOSE SERPL-MCNC: 100 MG/DL (ref 70–110)
GLUCOSE SERPL-MCNC: 100 MG/DL (ref 70–110)
GLUCOSE SERPL-MCNC: 104 MG/DL (ref 70–110)
GLUCOSE SERPL-MCNC: 110 MG/DL (ref 70–110)
GLUCOSE SERPL-MCNC: 114 MG/DL (ref 70–110)
GLUCOSE SERPL-MCNC: 31 MG/DL (ref 70–110)
GLUCOSE SERPL-MCNC: 60 MG/DL (ref 70–110)
GLUCOSE SERPL-MCNC: 71 MG/DL (ref 70–110)
GLUCOSE SERPL-MCNC: 74 MG/DL (ref 70–110)
GLUCOSE SERPL-MCNC: 87 MG/DL (ref 70–110)
HBA1C MFR BLD: 4.8 % (ref 4.5–6.2)
HCT VFR BLD AUTO: 31.7 % (ref 40–54)
HGB BLD-MCNC: 10 G/DL (ref 14–18)
IMM GRANULOCYTES # BLD AUTO: 0.05 K/UL (ref 0–0.04)
IMM GRANULOCYTES NFR BLD AUTO: 0.6 % (ref 0–0.5)
LYMPHOCYTES # BLD AUTO: 1.3 K/UL (ref 1–4.8)
LYMPHOCYTES NFR BLD: 16.9 % (ref 18–48)
MAGNESIUM SERPL-MCNC: 1.4 MG/DL (ref 1.6–2.6)
MCH RBC QN AUTO: 29 PG (ref 27–31)
MCHC RBC AUTO-ENTMCNC: 31.5 G/DL (ref 32–36)
MCV RBC AUTO: 92 FL (ref 82–98)
MONOCYTES # BLD AUTO: 0.3 K/UL (ref 0.3–1)
MONOCYTES NFR BLD: 4.3 % (ref 4–15)
NEUTROPHILS # BLD AUTO: 6.2 K/UL (ref 1.8–7.7)
NEUTROPHILS NFR BLD: 78.2 % (ref 38–73)
NRBC BLD-RTO: 0 /100 WBC
PLATELET # BLD AUTO: 405 K/UL (ref 150–450)
PMV BLD AUTO: 8.9 FL (ref 9.2–12.9)
POTASSIUM SERPL-SCNC: 5.2 MMOL/L (ref 3.5–5.1)
POTASSIUM SERPL-SCNC: 5.2 MMOL/L (ref 3.5–5.1)
POTASSIUM SERPL-SCNC: 5.9 MMOL/L (ref 3.5–5.1)
PROT SERPL-MCNC: 5.9 G/DL (ref 6–8.4)
RBC # BLD AUTO: 3.45 M/UL (ref 4.6–6.2)
SODIUM SERPL-SCNC: 138 MMOL/L (ref 136–145)
SODIUM SERPL-SCNC: 138 MMOL/L (ref 136–145)
SODIUM SERPL-SCNC: 139 MMOL/L (ref 136–145)
WBC # BLD AUTO: 7.89 K/UL (ref 3.9–12.7)

## 2023-04-21 PROCEDURE — 96376 TX/PRO/DX INJ SAME DRUG ADON: CPT

## 2023-04-21 PROCEDURE — 36415 COLL VENOUS BLD VENIPUNCTURE: CPT | Performed by: INTERNAL MEDICINE

## 2023-04-21 PROCEDURE — 12000002 HC ACUTE/MED SURGE SEMI-PRIVATE ROOM

## 2023-04-21 PROCEDURE — 80053 COMPREHEN METABOLIC PANEL: CPT | Performed by: INTERNAL MEDICINE

## 2023-04-21 PROCEDURE — 85025 COMPLETE CBC W/AUTO DIFF WBC: CPT | Performed by: INTERNAL MEDICINE

## 2023-04-21 PROCEDURE — 63600175 PHARM REV CODE 636 W HCPCS: Performed by: STUDENT IN AN ORGANIZED HEALTH CARE EDUCATION/TRAINING PROGRAM

## 2023-04-21 PROCEDURE — 63600175 PHARM REV CODE 636 W HCPCS: Performed by: INTERNAL MEDICINE

## 2023-04-21 PROCEDURE — 25000003 PHARM REV CODE 250: Performed by: STUDENT IN AN ORGANIZED HEALTH CARE EDUCATION/TRAINING PROGRAM

## 2023-04-21 PROCEDURE — 25000003 PHARM REV CODE 250: Performed by: INTERNAL MEDICINE

## 2023-04-21 PROCEDURE — 80048 BASIC METABOLIC PNL TOTAL CA: CPT | Performed by: ANESTHESIOLOGY

## 2023-04-21 PROCEDURE — 36415 COLL VENOUS BLD VENIPUNCTURE: CPT | Performed by: ANESTHESIOLOGY

## 2023-04-21 PROCEDURE — 83735 ASSAY OF MAGNESIUM: CPT | Performed by: INTERNAL MEDICINE

## 2023-04-21 PROCEDURE — 87449 NOS EACH ORGANISM AG IA: CPT | Performed by: INTERNAL MEDICINE

## 2023-04-21 PROCEDURE — 83036 HEMOGLOBIN GLYCOSYLATED A1C: CPT | Performed by: INTERNAL MEDICINE

## 2023-04-21 RX ORDER — INFLIXIMAB 100 MG/10ML
1000 INJECTION, POWDER, LYOPHILIZED, FOR SOLUTION INTRAVENOUS ONCE
Status: DISCONTINUED | OUTPATIENT
Start: 2023-04-22 | End: 2023-04-21

## 2023-04-21 RX ORDER — PROMETHAZINE HYDROCHLORIDE 25 MG/1
25 TABLET ORAL EVERY 6 HOURS PRN
Status: DISCONTINUED | OUTPATIENT
Start: 2023-04-21 | End: 2023-04-25 | Stop reason: HOSPADM

## 2023-04-21 RX ADMIN — ZOLPIDEM TARTRATE 10 MG: 5 TABLET, COATED ORAL at 09:04

## 2023-04-21 RX ADMIN — ATORVASTATIN CALCIUM 20 MG: 20 TABLET, FILM COATED ORAL at 09:04

## 2023-04-21 RX ADMIN — SODIUM CHLORIDE, SODIUM LACTATE, POTASSIUM CHLORIDE, AND CALCIUM CHLORIDE: .6; .31; .03; .02 INJECTION, SOLUTION INTRAVENOUS at 09:04

## 2023-04-21 RX ADMIN — METOPROLOL SUCCINATE 50 MG: 50 TABLET, FILM COATED, EXTENDED RELEASE ORAL at 03:04

## 2023-04-21 RX ADMIN — METHYLPREDNISOLONE SODIUM SUCCINATE 40 MG: 40 INJECTION, POWDER, FOR SOLUTION INTRAMUSCULAR; INTRAVENOUS at 06:04

## 2023-04-21 RX ADMIN — FAMOTIDINE 40 MG: 20 TABLET ORAL at 09:04

## 2023-04-21 RX ADMIN — PROMETHAZINE HYDROCHLORIDE 25 MG: 25 TABLET ORAL at 09:04

## 2023-04-21 RX ADMIN — SODIUM CHLORIDE 125 MG: 9 INJECTION, SOLUTION INTRAVENOUS at 04:04

## 2023-04-21 RX ADMIN — HYDROCODONE BITARTRATE AND ACETAMINOPHEN 1 TABLET: 5; 325 TABLET ORAL at 09:04

## 2023-04-21 RX ADMIN — DEXTROSE MONOHYDRATE 12.5 G: 25 INJECTION, SOLUTION INTRAVENOUS at 11:04

## 2023-04-21 NOTE — ASSESSMENT & PLAN NOTE
Patient with acute flare of his ulcerative colitis  Continue IV steroids  Continue IV hydration  Pain and nausea control  Gastroenterology consulting  Plans for endoscopy today

## 2023-04-21 NOTE — ASSESSMENT & PLAN NOTE
Patient's FSGs are controlled on current medication regimen.  Last A1c reviewed-   Lab Results   Component Value Date    HGBA1C 4.8 04/21/2023     Most recent fingerstick glucose reviewed- No results for input(s): POCTGLUCOSE in the last 24 hours.  Current correctional scale  Medium  Maintain anti-hyperglycemic dose as follows-   Antihyperglycemics (From admission, onward)    None        Hold Oral hypoglycemics while patient is in the hospital.

## 2023-04-21 NOTE — UM SECONDARY REVIEW
Other (see comment)    Level of Care Issue    Approved Inpatient    04/21/20223 @ 0833- PER SECURE CHAT DISCUSSION WITH DR. FRED SONG, ORDER UPGRADED TO IP.   AUTH/CERT SCREEN IS IS INCOMPLETE AT THIS TIME

## 2023-04-21 NOTE — CONSULTS
GASTROENTEROLOGY INPATIENT CONSULT NOTE  Patient Name: Jacoby Jean-Baptiste  Patient MRN: [id number]  Patient : 1975    Admit Date: 2023  Service date: 2023    Reason for Consult: colitis    PCP: Hunter Aguilar III, MD    Chief Complaint   Patient presents with    General Illness     Since January, body aches/cough/doesn't feel well       HPI: Patient is a 48 y.o. male with PMHx  HLD, DM on ozempic, HTN, LOLIS, umbilical hernia repair, tobacco use, ulcerative colitis presents for evaluation of nausea, abdominal cramping and loose stools.  Acute /subacute onset, intermittent, progressive on admission. S/p dificid / VSL3, vancomycin w/ recent FMT for C. Diff infection.  Has been on mesalamine in past, recently prescribed budesonide and recent insurance denial for Entyvio. No h/o blood clots.  I have personally been working on getting Entyvio which was denied by his insurance. At this point, denied a person / person peer review yesterday and again today despite my expressing my concerns regarding anemia, uncontrolled ulcerative colitis, recurrent C. Diff and now possible GIB.  Fax appeal has been sent and I have personally spoken w/ his insurance who cannot give me an answer and state may not. Notes occasional dark stools and nausea with his symptoms.     CHART REVIEW:   Labs  - C. Diff negative   Early  - placed on budesonide (prednisone avoided due to DM); insurance denied entyvio  Colon 3/'23 - Pan-colitis; Nml TI; s/p FMT for refractory C. diff  C. DIff + 3/'23  Labs  - CRP 21; Stool PCR + C. DIff....  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     Past Medical History:  Past Medical History:   Diagnosis Date    Diabetes mellitus 2022    Hyperlipidemia     Hypertension     Insomnia         Past Surgical History:  Past Surgical History:   Procedure Laterality Date     CARDIAC ELECTROPHYSIOLOGY MAPPING AND ABLATION  2017    SVT    CHOLECYSTECTOMY  2011    COLONOSCOPY N/A 5/3/2022    Procedure: COLONOSCOPY;  Surgeon: Shan Jean III, MD;  Location: [facility] ENDO;  Service: Endoscopy;  Laterality: N/A;    COLONOSCOPY WITH FECAL MICROBIOTA TRANSFER N/A 3/21/2023    Procedure: COLONOSCOPY, WITH FECAL MICROBIOTA TRANSFER;  Surgeon: David Millan MD;  Location: [facility] ENDO;  Service: Endoscopy;  Laterality: N/A;    GANGLION CYST EXCISION Left 1992    UMBILICAL HERNIA REPAIR  2011    with mesh        Home Medications:  Medications Prior to Admission   Medication Sig Dispense Refill Last Dose    budesonide (ENTOCORT EC) 3 mg capsule Take 9 mg by mouth once daily.   4/19/2023 at 0800    famotidine (PEPCID) 40 MG tablet Take 40 mg by mouth every evening.   4/19/2023 at 0800    metFORMIN (GLUCOPHAGE-XR) 500 MG ER 24hr tablet TAKE 1 TABLET(500 MG) BY MOUTH TWICE DAILY WITH MEALS (Patient taking differently: Take 500 mg by mouth 2 (two) times daily with meals.) 180 tablet 1 4/19/2023 at 1900    metoprolol succinate (TOPROL-XL) 50 MG 24 hr tablet TAKE 1 TABLET(50 MG) BY MOUTH EVERY DAY (Patient taking differently: Take 50 mg by mouth once daily.) 90 tablet 1 4/19/2023 at 1900    potassium chloride SA (K-DUR,KLOR-CON) 20 MEQ tablet Take 1 tablet (20 mEq total) by mouth once daily. 90 tablet 0 4/19/2023 at 1900    semaglutide (OZEMPIC) 1 mg/dose (4 mg/3 mL) INJECT 1 MG UNDER THE SKIN EVERY 7 DAYS (Patient taking differently: Inject 1 mg into the skin every 7 days.) 3 each 0 Past Week at 1900    sertraline (ZOLOFT) 50 MG tablet Take 1 tablet (50 mg total) by mouth once daily. 90 tablet 1 4/19/2023 at 0800    simvastatin (ZOCOR) 20 MG tablet TAKE 1 TABLET(20 MG) BY MOUTH EVERY NIGHT (Patient taking differently: Take 20 mg by mouth every evening.) 90 tablet 1 4/19/2023 at 1900    zolpidem (AMBIEN) 10 mg Tab Take 1 tablet (10 mg total) by mouth nightly as needed (insomnia). 30 tablet 2  2023 at 1900    balsalazide (COLAZAL) 750 mg capsule Take 750 mg by mouth 3 (three) times daily.       diphenoxylate-atropine 2.5-0.025 mg (LOMOTIL) 2.5-0.025 mg per tablet Take 1-2 tablets by mouth 4 times daily as needed for diarrhea. (Patient not taking: Reported on 3/20/2023) 40 tablet 1     magnesium oxide (MAG-OX) 400 mg (241.3 mg magnesium) tablet Take 400 mg by mouth once daily. Over the counter       ondansetron (ZOFRAN) 4 MG tablet Take by mouth.       ondansetron (ZOFRAN) 4 MG tablet Take 1 tablet (4 mg total) by mouth every 6 (six) hours as needed for Nausea. (Patient not taking: Reported on 3/20/2023) 20 tablet 0     ondansetron (ZOFRAN-ODT) 4 MG TbDL Take 4 mg by mouth every 4 to 6 hours as needed.   Unknown    SUTAB 1.479-0.188- 0.225 gram tablet SMARTSI Tablet(s) By Mouth As Directed          Inpatient Medications:   atorvastatin  20 mg Oral QHS    famotidine  40 mg Oral QHS    methylPREDNISolone sodium succinate injection  40 mg Intravenous Q12H    metoprolol succinate  50 mg Oral Daily     acetaminophen, dextrose 50%, dextrose 50%, glucagon (human recombinant), glucose, glucose, HYDROcodone-acetaminophen, magnesium oxide, magnesium oxide, morphine, naloxone, potassium bicarbonate, potassium bicarbonate, potassium bicarbonate, potassium, sodium phosphates, potassium, sodium phosphates, potassium, sodium phosphates, sodium chloride 0.9%, zolpidem    Review of patient's allergies indicates:  No Known Allergies    Social History:   Social History     Occupational History    Not on file   Tobacco Use    Smoking status: Every Day     Packs/day: 1.00     Years: 30.00     Pack years: 30.00     Types: Cigarettes    Smokeless tobacco: Never    Tobacco comments:     DO NOT SMOKE DOS   Substance and Sexual Activity    Alcohol use: Yes     Comment: ocass    Drug use: Not Currently    Sexual activity: Not Currently       Family History:   Family History   Problem Relation Age of Onset    Asthma Mother   "      Review of Systems:  A 10 point review of systems was performed and was normal, except as mentioned in the HPI, including constitutional, HEENT, heme, lymph, cardiovascular, respiratory, gastrointestinal, genitourinary, neurologic, endocrine, psychiatric and musculoskeletal.      OBJECTIVE:    Physical Exam:  24 Hour Vital Sign Ranges: Temp:  [97.7 °F (36.5 °C)-97.8 °F (36.6 °C)] 97.8 °F (36.6 °C)  Pulse:  [76-92] 76  Resp:  [18-19] 18  SpO2:  [95 %-100 %] 98 %  BP: (104-130)/(55-75) 112/71  Most recent vitals: /71   Pulse 76   Temp 97.8 °F (36.6 °C)   Resp 18   Ht 5' 5" (1.651 m)   Wt 104.7 kg (230 lb 13.2 oz)   SpO2 98%   BMI 38.41 kg/m²    GEN: well-developed, well-nourished, awake and alert, non-toxic appearing adult  HEENT: PERRL, sclera anicteric, oral mucosa pink and moist without lesion  NECK: trachea midline; Good ROM  CV: regular rate and rhythm, no murmurs or gallops  RESP: clear to auscultation bilaterally, no wheezes, rhonci or rales  ABD: soft, non-tender, non-distended, normal bowel sounds  EXT: no swelling or edema, 2+ pulses distally  SKIN: no rashes or jaundice  PSYCH: normal affect    Labs:   Recent Labs     04/20/23  1321 04/21/23  0525   WBC 9.58 7.89   MCV 91 92    405     Recent Labs     04/20/23  1321 04/21/23  0525    139   K 4.7 5.9*    108   CO2 18* 19*   BUN 26* 18   GLU 79 110     No results for input(s): ALB in the last 72 hours.    Invalid input(s): ALKP, SGOT, SGPT, TBIL, DBIL, TPRO  No results for input(s): PT, INR, PTT in the last 72 hours.      Radiology Review:  CT Abdomen Pelvis With Contrast   Final Result      X-Ray Chest AP Portable   Final Result            IMPRESSION / RECOMMENDATIONS:  48 y.o. male with PMHx  HLD, DM on ozempic, HTN, LOLIS, umbilical hernia repair, tobacco use, ulcerative colitis presents for evaluation of nausea, abdominal cramping and loose stools w/ recently treated recurrent C. Diff but unfortunately progressive severe " colitis. C. Diff negative and suspect this is all uncontrolled ulcerative colitis.     -EGD today to r/o UGI issues  -Start and load Remicade 10mg/kg and given additional dose early next week  -Continue steroids for now  -No antibiotics for now as nomrmal WBC and increased risks of recurrent C. Diff  -Will likely start MTX as outpatient vs allow trough to run high     Thank you for this consult.    Shan Jean III  4/21/2023  6:53 AM

## 2023-04-21 NOTE — PROGRESS NOTES
[facility] Medicine  Progress Note    Patient Name: Jacoby Jean-Baptiste  MRN: [id number]  Patient Class: IP- Inpatient   Admission Date: 4/20/2023  Length of Stay: 0 days  Attending Physician: Hector Nuno MD  Primary Care Provider: Hunter Aguilar III, MD        Subjective:     Principal Problem:Ulcerative colitis        HPI:  Mr. Jean-Baptiste is a 48-year-old male who presented to the ED with chief complaint of diarrhea.  Patient states he has not been feeling well since January 2023, he was diagnosed with C diff, status post 2 courses of Dificid, 2 courses of vancomycin, on 3/21/23 underwent fecal transplantation at [facility].  Patient with history of ulcerative colitis, followed by Dr. Jean, states in the last week he was started on budesonide, he is awaiting insurance authorization for initiation of biologics.  Unclear family history of IBD.  He states he continues to have diarrhea, quantified as 4-5 bowel movements per day, liquid, specks of blood seen, abdominal pain, central and lower, intermittent, worsened by eating, nausea with emesis.  States he has not been eating much over the last week.  Subjective chills, does not check temperature, no chest pain, shortness a of breath, urinary symptoms, skin rashes/lesions.  Current smoker 1 pack per day.  In the ED afebrile with T-max 97.7°, vital stable.  Labs with WBC 9.58, hemoglobin 9.6, BUN/creatinine 26/1.2, FOBT positive, chest x-ray clear, CT abdomen and pelvis with diffuse colonic wall thickening from proximal transverse colon to splenic flexure, descending and sigmoid colon.  He received Dilaudid 0.5 mg, Solu-Medrol 40 mg and 1 L fluid bolus.  Case discussed with ED provider who requested admission, states he discussed with on-call Gastroenterology, strongly advise against antibiotics with history of C diff, recommends Solu-Medrol 40 mg q.12h hours, will be seen in consultation.  Plan of care discussed with  patient.  No visitors at bedside.      Overview/Hospital Course:  No notes on file    Interval History:  Admitted with flare of his colitis.  Started on IV steroids.  GI evaluating and plans for endoscopy today.  He is feeling better already and abdominal pain is improving.  Bowel output is improving as well.    Review of Systems   All other systems reviewed and are negative.  Objective:     Vital Signs (Most Recent):  Temp: 97.9 °F (36.6 °C) (04/21/23 0745)  Pulse: 78 (04/21/23 0745)  Resp: 16 (04/21/23 0745)  BP: 114/72 (04/21/23 0745)  SpO2: 97 % (04/21/23 0745) Vital Signs (24h Range):  Temp:  [97.7 °F (36.5 °C)-97.9 °F (36.6 °C)] 97.9 °F (36.6 °C)  Pulse:  [76-85] 78  Resp:  [16-19] 16  SpO2:  [95 %-99 %] 97 %  BP: (109-130)/(59-72) 114/72     Weight: 104.7 kg (230 lb 13.2 oz)  Body mass index is 38.41 kg/m².    Intake/Output Summary (Last 24 hours) at 4/21/2023 1449  Last data filed at 4/21/2023 0500  Gross per 24 hour   Intake 900 ml   Output --   Net 900 ml      Physical Exam  Vitals reviewed.   Constitutional:       Appearance: Normal appearance.   HENT:      Head: Normocephalic and atraumatic.      Nose: Nose normal.      Mouth/Throat:      Mouth: Mucous membranes are moist.   Eyes:      Pupils: Pupils are equal, round, and reactive to light.   Cardiovascular:      Rate and Rhythm: Normal rate and regular rhythm.      Pulses: Normal pulses.      Heart sounds: Normal heart sounds. No murmur heard.    No friction rub. No gallop.   Pulmonary:      Effort: Pulmonary effort is normal. No respiratory distress.      Breath sounds: Normal breath sounds.   Abdominal:      General: Abdomen is flat. Bowel sounds are normal. There is no distension.      Palpations: Abdomen is soft.      Tenderness: There is abdominal tenderness.   Musculoskeletal:         General: No swelling. Normal range of motion.      Cervical back: Normal range of motion and neck supple.   Skin:     General: Skin is warm and dry.   Neurological:       General: No focal deficit present.      Mental Status: He is alert and oriented to person, place, and time.   Psychiatric:         Mood and Affect: Mood normal.         Behavior: Behavior normal.         Thought Content: Thought content normal.         Judgment: Judgment normal.       Significant Labs: All pertinent labs within the past 24 hours have been reviewed.    Significant Imaging: I have reviewed all pertinent imaging results/findings within the past 24 hours.      Assessment/Plan:      * Ulcerative colitis with acute flare  Patient with acute flare of his ulcerative colitis  Continue IV steroids  Continue IV hydration  Pain and nausea control  Gastroenterology consulting  Plans for endoscopy today  Remicade dose tomorrow      History of Clostridioides difficile colitis  Avoid antibiotics if possible      Lower GI bleed secondary to colitis  Monitor for GI bleeding      Anemia  Patient with iron-deficiency anemia  Will give IV iron while here      Type 2 diabetes mellitus without complication, without long-term current use of insulin  Patient's FSGs are controlled on current medication regimen.  Last A1c reviewed-   Lab Results   Component Value Date    HGBA1C 4.8 04/21/2023     Most recent fingerstick glucose reviewed- No results for input(s): POCTGLUCOSE in the last 24 hours.  Current correctional scale  Medium  Maintain anti-hyperglycemic dose as follows-   Antihyperglycemics (From admission, onward)      None          Hold Oral hypoglycemics while patient is in the hospital.    Hyperlipidemia  Statin        VTE Risk Mitigation (From admission, onward)           Ordered     IP VTE HIGH RISK PATIENT  Once         04/20/23 2011     Place sequential compression device  Until discontinued         04/20/23 2011                    Discharge Planning   GERARDO:      Code Status: Full Code   Is the patient medically ready for discharge?:     Reason for patient still in hospital (select all that apply):  Treatment                     Hector Nuno MD  Department of Hospital Medicine   [facility]

## 2023-04-21 NOTE — NURSING
0700: report received, in bed with no distress  1605: patient waiting to go to [facility], he's been waiting all day.  1800: sugar was low this afternoon and gave juices, IV went bad, house sup to come and restart.[facility] will do EGD on tomorrow am.

## 2023-04-21 NOTE — ASSESSMENT & PLAN NOTE
Patient with acute flare of his ulcerative colitis  Continue IV steroids  Continue IV hydration  Remicade load to start today  Plan for additiona remicade early next week per Dr Jean  EGD today  Pain and nausea control  Imodium

## 2023-04-21 NOTE — PLAN OF CARE
Problem: Adult Inpatient Plan of Care  Goal: Plan of Care Review  Outcome: Ongoing, Progressing  Goal: Optimal Comfort and Wellbeing  Outcome: Ongoing, Progressing  Intervention: Monitor Pain and Promote Comfort  Flowsheets (Taken 4/21/2023 0451)  Pain Management Interventions: medication offered     Problem: Diabetes Comorbidity  Goal: Blood Glucose Level Within Targeted Range  Outcome: Ongoing, Progressing  Intervention: Monitor and Manage Glycemia  Flowsheets (Taken 4/21/2023 0451)  Glycemic Management:   blood glucose monitored   carbohydrate replacement provided   oral glucose given   oral hydration promoted

## 2023-04-21 NOTE — PLAN OF CARE
[facility]  Initial Discharge Assessment       Primary Care Provider: Hunter Aguilar III, MD    Admission Diagnosis: Ulcerative colitis with rectal bleeding, unspecified location [K51.911]    Admission Date: 4/20/2023  Expected Discharge Date:     Discharge Barriers Identified: None    Initial assessment completed at bedside with patient. Patient anticipates discharge home when medically clear.    Payor: [employer] [employer] / Plan: [employer] TIERED / Product Type: Commercial /     Extended Emergency Contact Information  Primary Emergency Contact: Dallas Jean-Baptiste  Mobile Phone: [phone]  Relation: Brother  Preferred language: English   needed? No    Discharge Plan A: Home  Discharge Plan B: Home      [id number] - [place]  [address] AT [address] & [facility]  [address]   [place] [address]  Phone: [phone] Fax: [phone]    [id number] - [place] - [address] AT [facility] [address]  [facility] [place] [address]  Phone: [phone] Fax: [phone]      Initial Assessment (most recent)       Adult Discharge Assessment - 04/21/23 1521          Discharge Assessment    Assessment Type Discharge Planning Assessment     Confirmed/corrected address, phone number and insurance Yes     Confirmed Demographics Correct on Facesheet     Source of Information patient     When was your last doctors appointment? 04/07/23     Reason For Admission Ulcerative Colitis     People in Home alone     Facility Arrived From: home     Do you expect to return to your current living situation? Yes     Do you have help at home or someone to help you manage your care at home? No     Prior to hospitilization cognitive status: Alert/Oriented     Current cognitive status: Alert/Oriented     Walking or Climbing Stairs --   none    Dressing/Bathing --   none    Home Layout Able to live on 1st floor      Equipment Currently Used at Home none     Readmission within 30 days? No     Patient currently being followed by outpatient case management? No     Do you currently have service(s) that help you manage your care at home? No     Do you take prescription medications? Yes     Do you have prescription coverage? Yes     Coverage [employer]     Do you have any problems affording any of your prescribed medications? No     Is the patient taking medications as prescribed? yes     Who is going to help you get home at discharge? --   Plans to drive himself home.    How do you get to doctors appointments? car, drives self     Are you on dialysis? No     Do you take coumadin? No     Discharge Plan A Home     Discharge Plan B Home     DME Needed Upon Discharge  none     Discharge Plan discussed with: Patient     Discharge Barriers Identified None        Physical Activity    On average, how many days per week do you engage in moderate to strenuous exercise (like a brisk walk)? 0 days     On average, how many minutes do you engage in exercise at this level? 0 min        Financial Resource Strain    How hard is it for you to pay for the very basics like food, housing, medical care, and heating? Not very hard        Housing Stability    In the last 12 months, was there a time when you were not able to pay the mortgage or rent on time? No     In the last 12 months, how many places have you lived? 1     In the last 12 months, was there a time when you did not have a steady place to sleep or slept in a shelter (including now)? No        Transportation Needs    In the past 12 months, has lack of transportation kept you from medical appointments or from getting medications? No     In the past 12 months, has lack of transportation kept you from meetings, work, or from getting things needed for daily living? No        Food Insecurity    Within the past 12 months, you worried that your food would run out before you got the money to buy more. Never  true     Within the past 12 months, the food you bought just didn't last and you didn't have money to get more. Never true        Stress    Do you feel stress - tense, restless, nervous, or anxious, or unable to sleep at night because your mind is troubled all the time - these days? Only a little        Social Connections    In a typical week, how many times do you talk on the phone with family, friends, or neighbors? Twice a week     How often do you get together with friends or relatives? Twice a week     How often do you attend Judaism or Mormon services? Never     Do you belong to any clubs or organizations such as Judaism groups, unions, fraternal or athletic groups, or school groups? No     How often do you attend meetings of the clubs or organizations you belong to? Never     Are you , , , , never , or living with a partner? Never         Alcohol Use    Q1: How often do you have a drink containing alcohol? Never     Q2: How many drinks containing alcohol do you have on a typical day when you are drinking? Patient does not drink     Q3: How often do you have six or more drinks on one occasion? Never

## 2023-04-21 NOTE — SUBJECTIVE & OBJECTIVE
Interval History:  Admitted with flare of his colitis.  Started on IV steroids.  GI evaluating and plans for endoscopy today.  He is feeling better already and abdominal pain is improving.  Bowel output is improving as well.    Review of Systems   All other systems reviewed and are negative.  Objective:     Vital Signs (Most Recent):  Temp: 97.9 °F (36.6 °C) (04/21/23 0745)  Pulse: 78 (04/21/23 0745)  Resp: 16 (04/21/23 0745)  BP: 114/72 (04/21/23 0745)  SpO2: 97 % (04/21/23 0745) Vital Signs (24h Range):  Temp:  [97.7 °F (36.5 °C)-97.9 °F (36.6 °C)] 97.9 °F (36.6 °C)  Pulse:  [76-85] 78  Resp:  [16-19] 16  SpO2:  [95 %-99 %] 97 %  BP: (109-130)/(59-72) 114/72     Weight: 104.7 kg (230 lb 13.2 oz)  Body mass index is 38.41 kg/m².    Intake/Output Summary (Last 24 hours) at 4/21/2023 1449  Last data filed at 4/21/2023 0500  Gross per 24 hour   Intake 900 ml   Output --   Net 900 ml      Physical Exam  Vitals reviewed.   Constitutional:       Appearance: Normal appearance.   HENT:      Head: Normocephalic and atraumatic.      Nose: Nose normal.      Mouth/Throat:      Mouth: Mucous membranes are moist.   Eyes:      Pupils: Pupils are equal, round, and reactive to light.   Cardiovascular:      Rate and Rhythm: Normal rate and regular rhythm.      Pulses: Normal pulses.      Heart sounds: Normal heart sounds. No murmur heard.    No friction rub. No gallop.   Pulmonary:      Effort: Pulmonary effort is normal. No respiratory distress.      Breath sounds: Normal breath sounds.   Abdominal:      General: Abdomen is flat. Bowel sounds are normal. There is no distension.      Palpations: Abdomen is soft.      Tenderness: There is abdominal tenderness.   Musculoskeletal:         General: No swelling. Normal range of motion.      Cervical back: Normal range of motion and neck supple.   Skin:     General: Skin is warm and dry.   Neurological:      General: No focal deficit present.      Mental Status: He is alert and oriented  to person, place, and time.   Psychiatric:         Mood and Affect: Mood normal.         Behavior: Behavior normal.         Thought Content: Thought content normal.         Judgment: Judgment normal.       Significant Labs: All pertinent labs within the past 24 hours have been reviewed.    Significant Imaging: I have reviewed all pertinent imaging results/findings within the past 24 hours.

## 2023-04-22 LAB
ALBUMIN SERPL BCP-MCNC: 2.3 G/DL (ref 3.5–5.2)
ALP SERPL-CCNC: 89 U/L (ref 55–135)
ALT SERPL W/O P-5'-P-CCNC: 52 U/L (ref 10–44)
ANION GAP SERPL CALC-SCNC: 14 MMOL/L (ref 8–16)
AST SERPL-CCNC: 20 U/L (ref 10–40)
BILIRUB SERPL-MCNC: 1.1 MG/DL (ref 0.1–1)
BUN SERPL-MCNC: ABNORMAL MG/DL (ref 6–20)
CALCIUM SERPL-MCNC: 8.2 MG/DL (ref 8.7–10.5)
CHLORIDE SERPL-SCNC: 105 MMOL/L (ref 95–110)
CO2 SERPL-SCNC: 17 MMOL/L (ref 23–29)
CREAT SERPL-MCNC: 0.9 MG/DL (ref 0.5–1.4)
ERYTHROCYTE [DISTWIDTH] IN BLOOD BY AUTOMATED COUNT: 16.3 % (ref 11.5–14.5)
EST. GFR  (NO RACE VARIABLE): >60 ML/MIN/1.73 M^2
GLUCOSE SERPL-MCNC: 105 MG/DL (ref 70–110)
GLUCOSE SERPL-MCNC: 106 MG/DL (ref 70–110)
GLUCOSE SERPL-MCNC: 110 MG/DL (ref 70–110)
GLUCOSE SERPL-MCNC: 121 MG/DL (ref 70–110)
GLUCOSE SERPL-MCNC: 58 MG/DL (ref 70–110)
GLUCOSE SERPL-MCNC: 79 MG/DL (ref 70–110)
GLUCOSE SERPL-MCNC: 89 MG/DL (ref 70–110)
HCT VFR BLD AUTO: 34.4 % (ref 40–54)
HGB BLD-MCNC: 10.8 G/DL (ref 14–18)
MCH RBC QN AUTO: 28.9 PG (ref 27–31)
MCHC RBC AUTO-ENTMCNC: 31.4 G/DL (ref 32–36)
MCV RBC AUTO: 92 FL (ref 82–98)
PLATELET # BLD AUTO: 442 K/UL (ref 150–450)
PMV BLD AUTO: 9.1 FL (ref 9.2–12.9)
POTASSIUM SERPL-SCNC: 4.9 MMOL/L (ref 3.5–5.1)
PROT SERPL-MCNC: 6.4 G/DL (ref 6–8.4)
RBC # BLD AUTO: 3.74 M/UL (ref 4.6–6.2)
SODIUM SERPL-SCNC: 136 MMOL/L (ref 136–145)
WBC # BLD AUTO: 8.75 K/UL (ref 3.9–12.7)

## 2023-04-22 PROCEDURE — 36415 COLL VENOUS BLD VENIPUNCTURE: CPT | Performed by: STUDENT IN AN ORGANIZED HEALTH CARE EDUCATION/TRAINING PROGRAM

## 2023-04-22 PROCEDURE — 25000003 PHARM REV CODE 250: Performed by: STUDENT IN AN ORGANIZED HEALTH CARE EDUCATION/TRAINING PROGRAM

## 2023-04-22 PROCEDURE — 12000002 HC ACUTE/MED SURGE SEMI-PRIVATE ROOM

## 2023-04-22 PROCEDURE — 63600175 PHARM REV CODE 636 W HCPCS: Performed by: INTERNAL MEDICINE

## 2023-04-22 PROCEDURE — 25000003 PHARM REV CODE 250: Performed by: INTERNAL MEDICINE

## 2023-04-22 PROCEDURE — 63600175 PHARM REV CODE 636 W HCPCS: Performed by: STUDENT IN AN ORGANIZED HEALTH CARE EDUCATION/TRAINING PROGRAM

## 2023-04-22 PROCEDURE — 63600175 PHARM REV CODE 636 W HCPCS: Mod: JZ,JG | Performed by: INTERNAL MEDICINE

## 2023-04-22 PROCEDURE — 80053 COMPREHEN METABOLIC PANEL: CPT | Performed by: STUDENT IN AN ORGANIZED HEALTH CARE EDUCATION/TRAINING PROGRAM

## 2023-04-22 PROCEDURE — 85027 COMPLETE CBC AUTOMATED: CPT | Performed by: STUDENT IN AN ORGANIZED HEALTH CARE EDUCATION/TRAINING PROGRAM

## 2023-04-22 RX ORDER — MAG HYDROX/ALUMINUM HYD/SIMETH 200-200-20
30 SUSPENSION, ORAL (FINAL DOSE FORM) ORAL EVERY 6 HOURS PRN
Status: DISCONTINUED | OUTPATIENT
Start: 2023-04-22 | End: 2023-04-25 | Stop reason: HOSPADM

## 2023-04-22 RX ORDER — SIMETHICONE 80 MG
1 TABLET,CHEWABLE ORAL 3 TIMES DAILY PRN
Status: DISCONTINUED | OUTPATIENT
Start: 2023-04-23 | End: 2023-04-22

## 2023-04-22 RX ORDER — ONDANSETRON 2 MG/ML
4 INJECTION INTRAMUSCULAR; INTRAVENOUS EVERY 6 HOURS PRN
Status: DISCONTINUED | OUTPATIENT
Start: 2023-04-22 | End: 2023-04-25 | Stop reason: HOSPADM

## 2023-04-22 RX ORDER — LOPERAMIDE HYDROCHLORIDE 2 MG/1
2 CAPSULE ORAL 4 TIMES DAILY
Status: DISCONTINUED | OUTPATIENT
Start: 2023-04-22 | End: 2023-04-25 | Stop reason: HOSPADM

## 2023-04-22 RX ORDER — LIDOCAINE HYDROCHLORIDE 20 MG/ML
15 SOLUTION OROPHARYNGEAL EVERY 6 HOURS PRN
Status: DISCONTINUED | OUTPATIENT
Start: 2023-04-22 | End: 2023-04-25 | Stop reason: HOSPADM

## 2023-04-22 RX ORDER — SIMETHICONE 80 MG
1 TABLET,CHEWABLE ORAL 3 TIMES DAILY PRN
Status: DISCONTINUED | OUTPATIENT
Start: 2023-04-22 | End: 2023-04-25 | Stop reason: HOSPADM

## 2023-04-22 RX ADMIN — ONDANSETRON 4 MG: 2 INJECTION INTRAMUSCULAR; INTRAVENOUS at 08:04

## 2023-04-22 RX ADMIN — LOPERAMIDE HYDROCHLORIDE 2 MG: 2 CAPSULE ORAL at 12:04

## 2023-04-22 RX ADMIN — LOPERAMIDE HYDROCHLORIDE 2 MG: 2 CAPSULE ORAL at 09:04

## 2023-04-22 RX ADMIN — FAMOTIDINE 40 MG: 20 TABLET ORAL at 09:04

## 2023-04-22 RX ADMIN — PROMETHAZINE HYDROCHLORIDE 25 MG: 25 TABLET ORAL at 09:04

## 2023-04-22 RX ADMIN — SODIUM CHLORIDE 125 MG: 9 INJECTION, SOLUTION INTRAVENOUS at 09:04

## 2023-04-22 RX ADMIN — INFLIXIMAB 1000 MG: 100 INJECTION, POWDER, LYOPHILIZED, FOR SOLUTION INTRAVENOUS at 04:04

## 2023-04-22 RX ADMIN — SIMETHICONE 80 MG: 80 TABLET, CHEWABLE ORAL at 11:04

## 2023-04-22 RX ADMIN — LOPERAMIDE HYDROCHLORIDE 2 MG: 2 CAPSULE ORAL at 04:04

## 2023-04-22 RX ADMIN — METHYLPREDNISOLONE SODIUM SUCCINATE 40 MG: 40 INJECTION, POWDER, FOR SOLUTION INTRAMUSCULAR; INTRAVENOUS at 09:04

## 2023-04-22 RX ADMIN — METHYLPREDNISOLONE SODIUM SUCCINATE 40 MG: 40 INJECTION, POWDER, FOR SOLUTION INTRAMUSCULAR; INTRAVENOUS at 06:04

## 2023-04-22 RX ADMIN — ATORVASTATIN CALCIUM 20 MG: 20 TABLET, FILM COATED ORAL at 09:04

## 2023-04-22 RX ADMIN — PROMETHAZINE HYDROCHLORIDE 25 MG: 25 TABLET ORAL at 06:04

## 2023-04-22 RX ADMIN — ZOLPIDEM TARTRATE 10 MG: 5 TABLET, COATED ORAL at 09:04

## 2023-04-22 RX ADMIN — SODIUM CHLORIDE, SODIUM LACTATE, POTASSIUM CHLORIDE, AND CALCIUM CHLORIDE: .6; .31; .03; .02 INJECTION, SOLUTION INTRAVENOUS at 08:04

## 2023-04-22 NOTE — SUBJECTIVE & OBJECTIVE
Interval History:  Continues to have some diarrhea this morning.  Plans for EGD today.  He was started on Remicade as well.  He is ruled out for C diff.  Will trial imodium to help with his diarrhea symptoms. Abdominal pain improving this morning      Review of Systems   All other systems reviewed and are negative.  Objective:     Vital Signs (Most Recent):  Temp: 97.5 °F (36.4 °C) (04/22/23 0733)  Pulse: 75 (04/22/23 0733)  Resp: 18 (04/22/23 0733)  BP: (!) 105/55 (04/22/23 0733)  SpO2: 97 % (04/22/23 0733) Vital Signs (24h Range):  Temp:  [97.5 °F (36.4 °C)-97.8 °F (36.6 °C)] 97.5 °F (36.4 °C)  Pulse:  [65-88] 75  Resp:  [17-18] 18  SpO2:  [96 %-99 %] 97 %  BP: (101-136)/(55-72) 105/55     Weight: 104.7 kg (230 lb 13.2 oz)  Body mass index is 38.41 kg/m².    Intake/Output Summary (Last 24 hours) at 4/22/2023 0943  Last data filed at 4/22/2023 0805  Gross per 24 hour   Intake 125 ml   Output --   Net 125 ml        Physical Exam  Vitals reviewed.   Constitutional:       Appearance: Normal appearance.   HENT:      Head: Normocephalic and atraumatic.      Nose: Nose normal.      Mouth/Throat:      Mouth: Mucous membranes are moist.   Eyes:      Pupils: Pupils are equal, round, and reactive to light.   Cardiovascular:      Rate and Rhythm: Normal rate and regular rhythm.      Pulses: Normal pulses.      Heart sounds: Normal heart sounds. No murmur heard.    No friction rub. No gallop.   Pulmonary:      Effort: Pulmonary effort is normal. No respiratory distress.      Breath sounds: Normal breath sounds.   Abdominal:      General: Abdomen is flat. Bowel sounds are normal. There is no distension.      Palpations: Abdomen is soft.      Tenderness: There is no abdominal tenderness.   Musculoskeletal:         General: No swelling. Normal range of motion.      Cervical back: Normal range of motion and neck supple.   Skin:     General: Skin is warm and dry.   Neurological:      General: No focal deficit present.      Mental  Status: He is alert and oriented to person, place, and time.   Psychiatric:         Mood and Affect: Mood normal.         Behavior: Behavior normal.         Thought Content: Thought content normal.         Judgment: Judgment normal.       Significant Labs: All pertinent labs within the past 24 hours have been reviewed.    Significant Imaging: I have reviewed all pertinent imaging results/findings within the past 24 hours.

## 2023-04-22 NOTE — PROGRESS NOTES
Patient Name: Jacoby Jean-Baptiste  Patient MRN: [id number]  Patient : 1975    Admit Date: 2023  Service date: 2023    Reason for Consult: ulcerative colitis    PCP: Hunter Aguilar III, MD    S: Patient was scheduled for EGD today; pushed back due to juice this AM then cancelled due to patient eating crackers and juice less than 1 hour ago. Patient states he is still having abdominal pain which is unchanged since November. Still seeing BRB in stool which has not changed.      Original HPI: Patient is a 48 y.o. male with PMHx  HLD, DM on ozempic, HTN, LOLIS, umbilical hernia repair, tobacco use, ulcerative colitis presents for evaluation of nausea, abdominal cramping and loose stools.  Acute /subacute onset, intermittent, progressive on admission. S/p dificid / VSL3, vancomycin w/ recent FMT for C. Diff infection.  Has been on mesalamine in past, recently prescribed budesonide and recent insurance denial for Entyvio. No h/o blood clots.  I have personally been working on getting Entyvio which was denied by his insurance. At this point, denied a person / person peer review yesterday and again today despite my expressing my concerns regarding anemia, uncontrolled ulcerative colitis, recurrent C. Diff and now possible GIB.  Fax appeal has been sent and I have personally spoken w/ his insurance who cannot give me an answer and state may not. Notes occasional dark stools and nausea with his symptoms.      CHART REVIEW:   Labs  - C. Diff negative   Early  - placed on budesonide (prednisone avoided due to DM); insurance denied entyvio  Colon 3/'23 - Pan-colitis; Nml TI; s/p FMT for refractory C. diff  C. DIff + 3/'23  Labs  - CRP 21; Stool PCR + C. DIff....  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        Inpatient Medications:   atorvastatin  20 mg Oral QHS    famotidine  40 mg Oral  "QHS    ferric gluconate (FERRLECIT) IVPB  125 mg Intravenous Daily    inflixamab (REMICADE) IVPB  1,000 mg Intravenous Once    loperamide  2 mg Oral QID    methylPREDNISolone sodium succinate injection  40 mg Intravenous Q12H    metoprolol succinate  50 mg Oral Daily     acetaminophen, dextrose 50%, dextrose 50%, glucagon (human recombinant), glucose, glucose, HYDROcodone-acetaminophen, magnesium oxide, magnesium oxide, morphine, naloxone, potassium bicarbonate, potassium bicarbonate, potassium bicarbonate, potassium, sodium phosphates, potassium, sodium phosphates, potassium, sodium phosphates, promethazine, sodium chloride 0.9%, zolpidem    Review of Systems:  A 10 point review of systems was performed and was normal, except as mentioned in the HPI, including constitutional, HEENT, heme, lymph, cardiovascular, respiratory, gastrointestinal, genitourinary, neurologic, endocrine, psychiatric and musculoskeletal.      OBJECTIVE:    Physical Exam:  24 Hour Vital Sign Ranges: Temp:  [97.5 °F (36.4 °C)-97.8 °F (36.6 °C)] 97.6 °F (36.4 °C)  Pulse:  [65-88] 82  Resp:  [17-18] 18  SpO2:  [96 %-99 %] 98 %  BP: (101-136)/(55-72) 128/67  Most recent vitals: /67   Pulse 82   Temp 97.6 °F (36.4 °C) (Oral)   Resp 18   Ht 5' 5" (1.651 m)   Wt 104.7 kg (230 lb 13.2 oz)   SpO2 98%   BMI 38.41 kg/m²    GEN: well-developed, well-nourished, awake and alert, non-toxic appearing adult  HEENT: PERRL, sclera anicteric, oral mucosa pink and moist without lesion  NECK: trachea midline; Good ROM  CV: regular rate and rhythm, no murmurs or gallops  RESP: clear to auscultation bilaterally, no wheezes, rhonci or rales  ABD: soft, non-tender, non-distended, normal bowel sounds  EXT: no swelling or edema, 2+ pulses distally  SKIN: no rashes or jaundice  PSYCH: normal affect    Labs:   Recent Labs     04/20/23  1321 04/21/23  0525 04/22/23  0820   WBC 9.58 7.89 8.75   MCV 91 92 92    405 442     Recent Labs     04/21/23  0525 " 04/21/23  1723 04/22/23  0820    138  138 136   K 5.9* 5.2*  5.2* 4.9    105  105 105   CO2 19* 18*  18* 17*   BUN 18 21*  21* SEE COMMENT    100  100 106     No results for input(s): ALB in the last 72 hours.    Invalid input(s): ALKP, SGOT, SGPT, TBIL, DBIL, TPRO  No results for input(s): PT, INR, PTT in the last 72 hours.      Radiology Review:  CT Abdomen Pelvis With Contrast   Final Result      X-Ray Chest AP Portable   Final Result            IMPRESSION / RECOMMENDATIONS:  48 y.o. male with PMHx  HLD, DM on ozempic, HTN, LOLIS, umbilical hernia repair, tobacco use, ulcerative colitis presents for evaluation of nausea, abdominal cramping and loose stools w/ recently treated recurrent C. Diff but unfortunately progressive severe colitis. C. Diff negative and suspect this is all uncontrolled ulcerative colitis.      Plan:  -EGD is non-urgent. Will plan for EGD on Monday. He can have regular diet now  -Start and load Remicade 10mg/kg (due today) and given additional dose early next week  -Continue steroids for now  -No antibiotics for now as nomrmal WBC and increased risks of recurrent C. Diff  -Will likely start MTX as outpatient vs allow trough to run high      Thank you again,    Angela Barbosa  4/22/2023  12:17 PM

## 2023-04-22 NOTE — PROGRESS NOTES
[facility] Medicine  Progress Note    Patient Name: Jacoby Jean-Baptiste  MRN: [id number]  Patient Class: IP- Inpatient   Admission Date: 4/20/2023  Length of Stay: 1 days  Attending Physician: Hector Nuno MD  Primary Care Provider: Hunter Aguilar III, MD        Subjective:     Principal Problem:Ulcerative colitis        HPI:  Mr. Jean-Baptiste is a 48-year-old male who presented to the ED with chief complaint of diarrhea.  Patient states he has not been feeling well since January 2023, he was diagnosed with C diff, status post 2 courses of Dificid, 2 courses of vancomycin, on 3/21/23 underwent fecal transplantation at [facility].  Patient with history of ulcerative colitis, followed by Dr. Jean, states in the last week he was started on budesonide, he is awaiting insurance authorization for initiation of biologics.  Unclear family history of IBD.  He states he continues to have diarrhea, quantified as 4-5 bowel movements per day, liquid, specks of blood seen, abdominal pain, central and lower, intermittent, worsened by eating, nausea with emesis.  States he has not been eating much over the last week.  Subjective chills, does not check temperature, no chest pain, shortness a of breath, urinary symptoms, skin rashes/lesions.  Current smoker 1 pack per day.  In the ED afebrile with T-max 97.7°, vital stable.  Labs with WBC 9.58, hemoglobin 9.6, BUN/creatinine 26/1.2, FOBT positive, chest x-ray clear, CT abdomen and pelvis with diffuse colonic wall thickening from proximal transverse colon to splenic flexure, descending and sigmoid colon.  He received Dilaudid 0.5 mg, Solu-Medrol 40 mg and 1 L fluid bolus.  Case discussed with ED provider who requested admission, states he discussed with on-call Gastroenterology, strongly advise against antibiotics with history of C diff, recommends Solu-Medrol 40 mg q.12h hours, will be seen in consultation.  Plan of care discussed with  patient.  No visitors at bedside.      Overview/Hospital Course:  No notes on file    Interval History:  Continues to have some diarrhea this morning.  Plans for EGD today.  He was started on Remicade as well.  He is ruled out for C diff.  Will trial imodium to help with his diarrhea symptoms. Abdominal pain improving this morning      Review of Systems   All other systems reviewed and are negative.  Objective:     Vital Signs (Most Recent):  Temp: 97.5 °F (36.4 °C) (04/22/23 0733)  Pulse: 75 (04/22/23 0733)  Resp: 18 (04/22/23 0733)  BP: (!) 105/55 (04/22/23 0733)  SpO2: 97 % (04/22/23 0733) Vital Signs (24h Range):  Temp:  [97.5 °F (36.4 °C)-97.8 °F (36.6 °C)] 97.5 °F (36.4 °C)  Pulse:  [65-88] 75  Resp:  [17-18] 18  SpO2:  [96 %-99 %] 97 %  BP: (101-136)/(55-72) 105/55     Weight: 104.7 kg (230 lb 13.2 oz)  Body mass index is 38.41 kg/m².    Intake/Output Summary (Last 24 hours) at 4/22/2023 0943  Last data filed at 4/22/2023 0805  Gross per 24 hour   Intake 125 ml   Output --   Net 125 ml        Physical Exam  Vitals reviewed.   Constitutional:       Appearance: Normal appearance.   HENT:      Head: Normocephalic and atraumatic.      Nose: Nose normal.      Mouth/Throat:      Mouth: Mucous membranes are moist.   Eyes:      Pupils: Pupils are equal, round, and reactive to light.   Cardiovascular:      Rate and Rhythm: Normal rate and regular rhythm.      Pulses: Normal pulses.      Heart sounds: Normal heart sounds. No murmur heard.    No friction rub. No gallop.   Pulmonary:      Effort: Pulmonary effort is normal. No respiratory distress.      Breath sounds: Normal breath sounds.   Abdominal:      General: Abdomen is flat. Bowel sounds are normal. There is no distension.      Palpations: Abdomen is soft.      Tenderness: There is no abdominal tenderness.   Musculoskeletal:         General: No swelling. Normal range of motion.      Cervical back: Normal range of motion and neck supple.   Skin:     General: Skin  is warm and dry.   Neurological:      General: No focal deficit present.      Mental Status: He is alert and oriented to person, place, and time.   Psychiatric:         Mood and Affect: Mood normal.         Behavior: Behavior normal.         Thought Content: Thought content normal.         Judgment: Judgment normal.       Significant Labs: All pertinent labs within the past 24 hours have been reviewed.    Significant Imaging: I have reviewed all pertinent imaging results/findings within the past 24 hours.      Assessment/Plan:      * Ulcerative colitis with acute flare  Patient with acute flare of his ulcerative colitis  Continue IV steroids  Continue IV hydration  Remicade load to start today  Plan for additiona remicade early next week per Dr Jean  EGD today  Pain and nausea control  Imodium      History of Clostridioides difficile colitis  Avoid antibiotics if possible  C Diff ruled out      Lower GI bleed secondary to colitis  Monitor for GI bleeding  IV iron for iron deficiency      Anemia  Patient with iron-deficiency anemia  Will give IV iron while here x 3 days      Type 2 diabetes mellitus without complication, without long-term current use of insulin  Patient's FSGs are controlled on current medication regimen.  Last A1c reviewed-   Lab Results   Component Value Date    HGBA1C 4.8 04/21/2023     Most recent fingerstick glucose reviewed- No results for input(s): POCTGLUCOSE in the last 24 hours.  Current correctional scale  Medium  Maintain anti-hyperglycemic dose as follows-   Antihyperglycemics (From admission, onward)    None        Hold Oral hypoglycemics while patient is in the hospital.    Hyperlipidemia  Statin        VTE Risk Mitigation (From admission, onward)         Ordered     IP VTE HIGH RISK PATIENT  Once         04/20/23 2011     Place sequential compression device  Until discontinued         04/20/23 2011                Discharge Planning   GERARDO:      Code Status: Full Code   Is the  patient medically ready for discharge?:     Reason for patient still in hospital (select all that apply): Treatment  Discharge Plan A: Home                  Hector Nuno MD  Department of Hospital Medicine   [facility]

## 2023-04-23 LAB
ALBUMIN SERPL BCP-MCNC: 2.6 G/DL (ref 3.5–5.2)
ALP SERPL-CCNC: 87 U/L (ref 55–135)
ALT SERPL W/O P-5'-P-CCNC: 44 U/L (ref 10–44)
ANION GAP SERPL CALC-SCNC: 15 MMOL/L (ref 8–16)
AST SERPL-CCNC: 23 U/L (ref 10–40)
BILIRUB SERPL-MCNC: 0.7 MG/DL (ref 0.1–1)
BUN SERPL-MCNC: 19 MG/DL (ref 6–20)
CALCIUM SERPL-MCNC: 8.4 MG/DL (ref 8.7–10.5)
CHLORIDE SERPL-SCNC: 98 MMOL/L (ref 95–110)
CO2 SERPL-SCNC: 18 MMOL/L (ref 23–29)
CREAT SERPL-MCNC: 0.8 MG/DL (ref 0.5–1.4)
ERYTHROCYTE [DISTWIDTH] IN BLOOD BY AUTOMATED COUNT: 15.9 % (ref 11.5–14.5)
EST. GFR  (NO RACE VARIABLE): >60 ML/MIN/1.73 M^2
GLUCOSE SERPL-MCNC: 112 MG/DL (ref 70–110)
GLUCOSE SERPL-MCNC: 113 MG/DL (ref 70–110)
GLUCOSE SERPL-MCNC: 154 MG/DL (ref 70–110)
GLUCOSE SERPL-MCNC: 98 MG/DL (ref 70–110)
HCT VFR BLD AUTO: 29.9 % (ref 40–54)
HGB BLD-MCNC: 9.6 G/DL (ref 14–18)
MCH RBC QN AUTO: 29 PG (ref 27–31)
MCHC RBC AUTO-ENTMCNC: 32.1 G/DL (ref 32–36)
MCV RBC AUTO: 90 FL (ref 82–98)
PLATELET # BLD AUTO: 381 K/UL (ref 150–450)
PMV BLD AUTO: 9.2 FL (ref 9.2–12.9)
POTASSIUM SERPL-SCNC: 4.8 MMOL/L (ref 3.5–5.1)
PROT SERPL-MCNC: 6.6 G/DL (ref 6–8.4)
RBC # BLD AUTO: 3.31 M/UL (ref 4.6–6.2)
SODIUM SERPL-SCNC: 131 MMOL/L (ref 136–145)
STOOL CULTURE: NORMAL
STOOL CULTURE: NORMAL
WBC # BLD AUTO: 7.34 K/UL (ref 3.9–12.7)

## 2023-04-23 PROCEDURE — 85027 COMPLETE CBC AUTOMATED: CPT | Performed by: STUDENT IN AN ORGANIZED HEALTH CARE EDUCATION/TRAINING PROGRAM

## 2023-04-23 PROCEDURE — 36415 COLL VENOUS BLD VENIPUNCTURE: CPT | Performed by: STUDENT IN AN ORGANIZED HEALTH CARE EDUCATION/TRAINING PROGRAM

## 2023-04-23 PROCEDURE — 25000003 PHARM REV CODE 250: Performed by: INTERNAL MEDICINE

## 2023-04-23 PROCEDURE — 63600175 PHARM REV CODE 636 W HCPCS: Performed by: INTERNAL MEDICINE

## 2023-04-23 PROCEDURE — 63600175 PHARM REV CODE 636 W HCPCS: Performed by: STUDENT IN AN ORGANIZED HEALTH CARE EDUCATION/TRAINING PROGRAM

## 2023-04-23 PROCEDURE — 80053 COMPREHEN METABOLIC PANEL: CPT | Performed by: STUDENT IN AN ORGANIZED HEALTH CARE EDUCATION/TRAINING PROGRAM

## 2023-04-23 PROCEDURE — 25000003 PHARM REV CODE 250: Performed by: STUDENT IN AN ORGANIZED HEALTH CARE EDUCATION/TRAINING PROGRAM

## 2023-04-23 PROCEDURE — 12000002 HC ACUTE/MED SURGE SEMI-PRIVATE ROOM

## 2023-04-23 RX ADMIN — ZOLPIDEM TARTRATE 10 MG: 5 TABLET, COATED ORAL at 08:04

## 2023-04-23 RX ADMIN — METHYLPREDNISOLONE SODIUM SUCCINATE 40 MG: 40 INJECTION, POWDER, FOR SOLUTION INTRAMUSCULAR; INTRAVENOUS at 06:04

## 2023-04-23 RX ADMIN — ALUMINUM HYDROXIDE, MAGNESIUM HYDROXIDE, AND SIMETHICONE 30 ML: 200; 200; 20 SUSPENSION ORAL at 12:04

## 2023-04-23 RX ADMIN — LOPERAMIDE HYDROCHLORIDE 2 MG: 2 CAPSULE ORAL at 12:04

## 2023-04-23 RX ADMIN — METOPROLOL SUCCINATE 50 MG: 50 TABLET, FILM COATED, EXTENDED RELEASE ORAL at 09:04

## 2023-04-23 RX ADMIN — ATORVASTATIN CALCIUM 20 MG: 20 TABLET, FILM COATED ORAL at 08:04

## 2023-04-23 RX ADMIN — LOPERAMIDE HYDROCHLORIDE 2 MG: 2 CAPSULE ORAL at 04:04

## 2023-04-23 RX ADMIN — HYDROCODONE BITARTRATE AND ACETAMINOPHEN 1 TABLET: 5; 325 TABLET ORAL at 08:04

## 2023-04-23 RX ADMIN — HYDROCODONE BITARTRATE AND ACETAMINOPHEN 1 TABLET: 5; 325 TABLET ORAL at 12:04

## 2023-04-23 RX ADMIN — LOPERAMIDE HYDROCHLORIDE 2 MG: 2 CAPSULE ORAL at 09:04

## 2023-04-23 RX ADMIN — LIDOCAINE HYDROCHLORIDE 15 ML: 20 SOLUTION ORAL; TOPICAL at 12:04

## 2023-04-23 RX ADMIN — PROMETHAZINE HYDROCHLORIDE 25 MG: 25 TABLET ORAL at 04:04

## 2023-04-23 RX ADMIN — FAMOTIDINE 40 MG: 20 TABLET ORAL at 08:04

## 2023-04-23 RX ADMIN — SIMETHICONE 80 MG: 80 TABLET, CHEWABLE ORAL at 08:04

## 2023-04-23 RX ADMIN — SODIUM CHLORIDE 125 MG: 9 INJECTION, SOLUTION INTRAVENOUS at 09:04

## 2023-04-23 RX ADMIN — SODIUM CHLORIDE, SODIUM LACTATE, POTASSIUM CHLORIDE, AND CALCIUM CHLORIDE: .6; .31; .03; .02 INJECTION, SOLUTION INTRAVENOUS at 09:04

## 2023-04-23 RX ADMIN — LOPERAMIDE HYDROCHLORIDE 2 MG: 2 CAPSULE ORAL at 08:04

## 2023-04-23 NOTE — PROGRESS NOTES
Patient Name: Jacoby Jean-Baptiste  Patient MRN: [id number]  Patient : 1975    Admit Date: 2023  Service date: 2023    Reason for Consult: ulcerative colitis    PCP: Hunter Aguilar III, MD    S: Patient sitting up in chair with no acute complaints. States he wants a cigarette; educated on smoking cessation.     Original HPI: Patient is a 48 y.o. male with PMHx  HLD, DM on ozempic, HTN, LOLIS, umbilical hernia repair, tobacco use, ulcerative colitis presents for evaluation of nausea, abdominal cramping and loose stools.  Acute /subacute onset, intermittent, progressive on admission. S/p dificid / VSL3, vancomycin w/ recent FMT for C. Diff infection.  Has been on mesalamine in past, recently prescribed budesonide and recent insurance denial for Entyvio. No h/o blood clots.  I have personally been working on getting Entyvio which was denied by his insurance. At this point, denied a person / person peer review yesterday and again today despite my expressing my concerns regarding anemia, uncontrolled ulcerative colitis, recurrent C. Diff and now possible GIB.  Fax appeal has been sent and I have personally spoken w/ his insurance who cannot give me an answer and state may not. Notes occasional dark stools and nausea with his symptoms.      CHART REVIEW:   Labs  - C. Diff negative   Early  - placed on budesonide (prednisone avoided due to DM); insurance denied entyvio  Colon 3/'23 - Pan-colitis; Nml TI; s/p FMT for refractory C. diff  C. DIff + 3/'23  Labs  - CRP 21; Stool PCR + C. DIff....  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        Inpatient Medications:   atorvastatin  20 mg Oral QHS    famotidine  40 mg Oral QHS    loperamide  2 mg Oral QID    methylPREDNISolone sodium succinate injection  40 mg Intravenous Q12H    metoprolol succinate  50 mg Oral Daily     acetaminophen,  "aluminum-magnesium hydroxide-simethicone **AND** LIDOcaine HCl 2%, dextrose 50%, dextrose 50%, glucagon (human recombinant), glucose, glucose, HYDROcodone-acetaminophen, magnesium oxide, magnesium oxide, morphine, naloxone, ondansetron, potassium bicarbonate, potassium bicarbonate, potassium bicarbonate, potassium, sodium phosphates, potassium, sodium phosphates, potassium, sodium phosphates, promethazine, simethicone, sodium chloride 0.9%, zolpidem    Review of Systems:  A 10 point review of systems was performed and was normal, except as mentioned in the HPI, including constitutional, HEENT, heme, lymph, cardiovascular, respiratory, gastrointestinal, genitourinary, neurologic, endocrine, psychiatric and musculoskeletal.      OBJECTIVE:    Physical Exam:  24 Hour Vital Sign Ranges: Temp:  [97.4 °F (36.3 °C)-98.3 °F (36.8 °C)] 97.7 °F (36.5 °C)  Pulse:  [65-82] 66  Resp:  [17-18] 18  SpO2:  [96 %-100 %] 98 %  BP: (112-129)/(59-67) 115/61  Most recent vitals: /61 (BP Location: Left arm, Patient Position: Lying)   Pulse 66   Temp 97.7 °F (36.5 °C) (Oral)   Resp 18   Ht 5' 5" (1.651 m)   Wt 104.7 kg (230 lb 13.2 oz)   SpO2 98%   BMI 38.41 kg/m²    GEN: well-developed, well-nourished, awake and alert, non-toxic appearing adult  HEENT: PERRL, sclera anicteric, oral mucosa pink and moist without lesion  NECK: trachea midline; Good ROM  CV: regular rate and rhythm, no murmurs or gallops  RESP: clear to auscultation bilaterally, no wheezes, rhonci or rales  ABD: soft, non-tender, non-distended, normal bowel sounds  EXT: no swelling or edema, 2+ pulses distally  SKIN: no rashes or jaundice  PSYCH: normal affect    Labs:   Recent Labs     04/20/23  1321 04/21/23  0525 04/22/23  0820   WBC 9.58 7.89 8.75   MCV 91 92 92    405 442       Recent Labs     04/21/23  1723 04/22/23  0820 04/23/23  0848     138 136 131*   K 5.2*  5.2* 4.9 4.8     105 105 98   CO2 18*  18* 17* 18*   BUN 21*  21* SEE " COMMENT 19     100 106 112*       No results for input(s): ALB in the last 72 hours.    Invalid input(s): ALKP, SGOT, SGPT, TBIL, DBIL, TPRO  No results for input(s): PT, INR, PTT in the last 72 hours.      Radiology Review:  CT Abdomen Pelvis With Contrast   Final Result      X-Ray Chest AP Portable   Final Result            IMPRESSION / RECOMMENDATIONS:  48 y.o. male with PMHx  HLD, DM on ozempic, HTN, LOLIS, umbilical hernia repair, tobacco use, ulcerative colitis presents for evaluation of nausea, abdominal cramping and loose stools w/ recently treated recurrent C. Diff but unfortunately progressive severe colitis. C. Diff negative and suspect this is all uncontrolled ulcerative colitis.      Plan:  -EGD is non-urgent. Will plan for EGD on Monday. He can have regular diet now  -Start and load Remicade 10mg/kg (administered Saturday) and given additional dose early next week  -Continue steroids for now  -No antibiotics for now as nomrmal WBC and increased risks of recurrent C. Diff  -Will likely start MTX as outpatient vs allow trough to run high      Thank you again,    Angela Barbosa  4/23/2023  12:17 PM

## 2023-04-23 NOTE — PROGRESS NOTES
[facility] Medicine  Progress Note    Patient Name: Jacoby Jean-Baptiste  MRN: [id number]  Patient Class: IP- Inpatient   Admission Date: 4/20/2023  Length of Stay: 2 days  Attending Physician: Hector Nuno MD  Primary Care Provider: Hunter Aguilar III, MD        Subjective:     Principal Problem:Ulcerative colitis        HPI:  Mr. Jean-Baptiste is a 48-year-old male who presented to the ED with chief complaint of diarrhea.  Patient states he has not been feeling well since January 2023, he was diagnosed with C diff, status post 2 courses of Dificid, 2 courses of vancomycin, on 3/21/23 underwent fecal transplantation at [facility].  Patient with history of ulcerative colitis, followed by Dr. Jean, states in the last week he was started on budesonide, he is awaiting insurance authorization for initiation of biologics.  Unclear family history of IBD.  He states he continues to have diarrhea, quantified as 4-5 bowel movements per day, liquid, specks of blood seen, abdominal pain, central and lower, intermittent, worsened by eating, nausea with emesis.  States he has not been eating much over the last week.  Subjective chills, does not check temperature, no chest pain, shortness a of breath, urinary symptoms, skin rashes/lesions.  Current smoker 1 pack per day.  In the ED afebrile with T-max 97.7°, vital stable.  Labs with WBC 9.58, hemoglobin 9.6, BUN/creatinine 26/1.2, FOBT positive, chest x-ray clear, CT abdomen and pelvis with diffuse colonic wall thickening from proximal transverse colon to splenic flexure, descending and sigmoid colon.  He received Dilaudid 0.5 mg, Solu-Medrol 40 mg and 1 L fluid bolus.  Case discussed with ED provider who requested admission, states he discussed with on-call Gastroenterology, strongly advise against antibiotics with history of C diff, recommends Solu-Medrol 40 mg q.12h hours, will be seen in consultation.  Plan of care discussed with  patient.  No visitors at bedside.      Overview/Hospital Course:  No notes on file    Interval History:  diarrhea improving. Overall feeling better. Pending EGD in the am. Remicade dose again this week. Hopefully can d/c home tomorrow    Review of Systems   All other systems reviewed and are negative.  Objective:     Vital Signs (Most Recent):  Temp: 97.7 °F (36.5 °C) (04/23/23 0346)  Pulse: 66 (04/23/23 0346)  Resp: 18 (04/23/23 0346)  BP: 115/61 (04/23/23 0346)  SpO2: 98 % (04/23/23 0346) Vital Signs (24h Range):  Temp:  [97.4 °F (36.3 °C)-98.3 °F (36.8 °C)] 97.7 °F (36.5 °C)  Pulse:  [65-82] 66  Resp:  [17-18] 18  SpO2:  [96 %-100 %] 98 %  BP: (112-129)/(59-67) 115/61     Weight: 104.7 kg (230 lb 13.2 oz)  Body mass index is 38.41 kg/m².    Intake/Output Summary (Last 24 hours) at 4/23/2023 1038  Last data filed at 4/22/2023 1701  Gross per 24 hour   Intake 1690.88 ml   Output --   Net 1690.88 ml        Physical Exam  Vitals reviewed.   Constitutional:       Appearance: Normal appearance.   HENT:      Head: Normocephalic and atraumatic.      Nose: Nose normal.      Mouth/Throat:      Mouth: Mucous membranes are moist.   Eyes:      Pupils: Pupils are equal, round, and reactive to light.   Cardiovascular:      Rate and Rhythm: Normal rate and regular rhythm.      Pulses: Normal pulses.      Heart sounds: Normal heart sounds. No murmur heard.    No friction rub. No gallop.   Pulmonary:      Effort: Pulmonary effort is normal. No respiratory distress.      Breath sounds: Normal breath sounds.   Abdominal:      General: Abdomen is flat. Bowel sounds are normal. There is no distension.      Palpations: Abdomen is soft.      Tenderness: There is no abdominal tenderness.   Musculoskeletal:         General: No swelling. Normal range of motion.      Cervical back: Normal range of motion and neck supple.   Skin:     General: Skin is warm and dry.   Neurological:      General: No focal deficit present.      Mental Status: He  is alert and oriented to person, place, and time.   Psychiatric:         Mood and Affect: Mood normal.         Behavior: Behavior normal.         Thought Content: Thought content normal.         Judgment: Judgment normal.       Significant Labs: All pertinent labs within the past 24 hours have been reviewed.    Significant Imaging: I have reviewed all pertinent imaging results/findings within the past 24 hours.      Assessment/Plan:      * Ulcerative colitis with acute flare  Patient with acute flare of his ulcerative colitis  Continue IV steroids  Continue IV hydration  NPO midnight, EGD in the am  Remicade dose again this week   Pain and nausea control  Imodium      History of Clostridioides difficile colitis  Avoid antibiotics if possible  C Diff ruled out      Lower GI bleed secondary to colitis  Monitor for GI bleeding  IV iron for iron deficiency      Anemia  Patient with iron-deficiency anemia  Will give IV iron while here x 3 days      Type 2 diabetes mellitus without complication, without long-term current use of insulin  Patient's FSGs are controlled on current medication regimen.  Last A1c reviewed-   Lab Results   Component Value Date    HGBA1C 4.8 04/21/2023     Most recent fingerstick glucose reviewed- No results for input(s): POCTGLUCOSE in the last 24 hours.  Current correctional scale  Medium  Maintain anti-hyperglycemic dose as follows-   Antihyperglycemics (From admission, onward)    None        Hold Oral hypoglycemics while patient is in the hospital.    Hyperlipidemia  Statin        VTE Risk Mitigation (From admission, onward)         Ordered     IP VTE HIGH RISK PATIENT  Once         04/20/23 2011     Place sequential compression device  Until discontinued         04/20/23 2011                Discharge Planning   GERARDO:      Code Status: Full Code   Is the patient medically ready for discharge?:     Reason for patient still in hospital (select all that apply): Treatment  Discharge Plan A: Home                   Hector Nuno MD  Department of Hospital Medicine   [facility]

## 2023-04-23 NOTE — SUBJECTIVE & OBJECTIVE
Interval History:  diarrhea improving. Overall feeling better. Pending EGD in the am. Remicade dose again this week. Hopefully can d/c home tomorrow    Review of Systems   All other systems reviewed and are negative.  Objective:     Vital Signs (Most Recent):  Temp: 97.7 °F (36.5 °C) (04/23/23 0346)  Pulse: 66 (04/23/23 0346)  Resp: 18 (04/23/23 0346)  BP: 115/61 (04/23/23 0346)  SpO2: 98 % (04/23/23 0346) Vital Signs (24h Range):  Temp:  [97.4 °F (36.3 °C)-98.3 °F (36.8 °C)] 97.7 °F (36.5 °C)  Pulse:  [65-82] 66  Resp:  [17-18] 18  SpO2:  [96 %-100 %] 98 %  BP: (112-129)/(59-67) 115/61     Weight: 104.7 kg (230 lb 13.2 oz)  Body mass index is 38.41 kg/m².    Intake/Output Summary (Last 24 hours) at 4/23/2023 1038  Last data filed at 4/22/2023 1701  Gross per 24 hour   Intake 1690.88 ml   Output --   Net 1690.88 ml        Physical Exam  Vitals reviewed.   Constitutional:       Appearance: Normal appearance.   HENT:      Head: Normocephalic and atraumatic.      Nose: Nose normal.      Mouth/Throat:      Mouth: Mucous membranes are moist.   Eyes:      Pupils: Pupils are equal, round, and reactive to light.   Cardiovascular:      Rate and Rhythm: Normal rate and regular rhythm.      Pulses: Normal pulses.      Heart sounds: Normal heart sounds. No murmur heard.    No friction rub. No gallop.   Pulmonary:      Effort: Pulmonary effort is normal. No respiratory distress.      Breath sounds: Normal breath sounds.   Abdominal:      General: Abdomen is flat. Bowel sounds are normal. There is no distension.      Palpations: Abdomen is soft.      Tenderness: There is no abdominal tenderness.   Musculoskeletal:         General: No swelling. Normal range of motion.      Cervical back: Normal range of motion and neck supple.   Skin:     General: Skin is warm and dry.   Neurological:      General: No focal deficit present.      Mental Status: He is alert and oriented to person, place, and time.   Psychiatric:         Mood and  Affect: Mood normal.         Behavior: Behavior normal.         Thought Content: Thought content normal.         Judgment: Judgment normal.       Significant Labs: All pertinent labs within the past 24 hours have been reviewed.    Significant Imaging: I have reviewed all pertinent imaging results/findings within the past 24 hours.

## 2023-04-23 NOTE — ASSESSMENT & PLAN NOTE
Patient with acute flare of his ulcerative colitis  Continue IV steroids  Continue IV hydration  NPO midnight, EGD in the am  Remicade dose again this week   Pain and nausea control  Imodium

## 2023-04-24 ENCOUNTER — ANESTHESIA (OUTPATIENT)
Dept: SURGERY | Facility: HOSPITAL | Age: 48
DRG: 385 | End: 2023-04-24
Payer: COMMERCIAL

## 2023-04-24 ENCOUNTER — ANESTHESIA EVENT (OUTPATIENT)
Dept: SURGERY | Facility: HOSPITAL | Age: 48
DRG: 385 | End: 2023-04-24
Payer: COMMERCIAL

## 2023-04-24 VITALS
DIASTOLIC BLOOD PRESSURE: 73 MMHG | RESPIRATION RATE: 13 BRPM | OXYGEN SATURATION: 100 % | HEART RATE: 56 BPM | SYSTOLIC BLOOD PRESSURE: 129 MMHG

## 2023-04-24 PROBLEM — K27.9 PUD (PEPTIC ULCER DISEASE): Status: ACTIVE | Noted: 2023-04-24

## 2023-04-24 LAB
ALBUMIN SERPL BCP-MCNC: 2.2 G/DL (ref 3.5–5.2)
ALP SERPL-CCNC: 71 U/L (ref 55–135)
ALT SERPL W/O P-5'-P-CCNC: 41 U/L (ref 10–44)
ANION GAP SERPL CALC-SCNC: 10 MMOL/L (ref 8–16)
AST SERPL-CCNC: 28 U/L (ref 10–40)
BILIRUB SERPL-MCNC: 0.8 MG/DL (ref 0.1–1)
BUN SERPL-MCNC: 20 MG/DL (ref 6–20)
CALCIUM SERPL-MCNC: 7.9 MG/DL (ref 8.7–10.5)
CHLORIDE SERPL-SCNC: 102 MMOL/L (ref 95–110)
CO2 SERPL-SCNC: 22 MMOL/L (ref 23–29)
CREAT SERPL-MCNC: 0.9 MG/DL (ref 0.5–1.4)
ERYTHROCYTE [DISTWIDTH] IN BLOOD BY AUTOMATED COUNT: 15.9 % (ref 11.5–14.5)
EST. GFR  (NO RACE VARIABLE): >60 ML/MIN/1.73 M^2
GLUCOSE SERPL-MCNC: 107 MG/DL (ref 70–110)
GLUCOSE SERPL-MCNC: 113 MG/DL (ref 70–110)
GLUCOSE SERPL-MCNC: 113 MG/DL (ref 70–110)
GLUCOSE SERPL-MCNC: 146 MG/DL (ref 70–110)
GLUCOSE SERPL-MCNC: 87 MG/DL (ref 70–110)
HCT VFR BLD AUTO: 28.5 % (ref 40–54)
HGB BLD-MCNC: 9.2 G/DL (ref 14–18)
MCH RBC QN AUTO: 28.7 PG (ref 27–31)
MCHC RBC AUTO-ENTMCNC: 32.3 G/DL (ref 32–36)
MCV RBC AUTO: 89 FL (ref 82–98)
PLATELET # BLD AUTO: 321 K/UL (ref 150–450)
PMV BLD AUTO: 9.1 FL (ref 9.2–12.9)
POTASSIUM SERPL-SCNC: 4.5 MMOL/L (ref 3.5–5.1)
PROT SERPL-MCNC: 5.6 G/DL (ref 6–8.4)
RBC # BLD AUTO: 3.21 M/UL (ref 4.6–6.2)
SODIUM SERPL-SCNC: 134 MMOL/L (ref 136–145)
WBC # BLD AUTO: 8.76 K/UL (ref 3.9–12.7)

## 2023-04-24 PROCEDURE — D9220A PRA ANESTHESIA: Mod: ANES,,, | Performed by: STUDENT IN AN ORGANIZED HEALTH CARE EDUCATION/TRAINING PROGRAM

## 2023-04-24 PROCEDURE — 37000008 HC ANESTHESIA 1ST 15 MINUTES: Performed by: INTERNAL MEDICINE

## 2023-04-24 PROCEDURE — 85027 COMPLETE CBC AUTOMATED: CPT | Performed by: STUDENT IN AN ORGANIZED HEALTH CARE EDUCATION/TRAINING PROGRAM

## 2023-04-24 PROCEDURE — 63600175 PHARM REV CODE 636 W HCPCS: Performed by: NURSE ANESTHETIST, CERTIFIED REGISTERED

## 2023-04-24 PROCEDURE — 36415 COLL VENOUS BLD VENIPUNCTURE: CPT | Performed by: STUDENT IN AN ORGANIZED HEALTH CARE EDUCATION/TRAINING PROGRAM

## 2023-04-24 PROCEDURE — 27200043 HC FORCEPS, BIOPSY: Performed by: INTERNAL MEDICINE

## 2023-04-24 PROCEDURE — 80053 COMPREHEN METABOLIC PANEL: CPT | Performed by: STUDENT IN AN ORGANIZED HEALTH CARE EDUCATION/TRAINING PROGRAM

## 2023-04-24 PROCEDURE — 37000009 HC ANESTHESIA EA ADD 15 MINS: Performed by: INTERNAL MEDICINE

## 2023-04-24 PROCEDURE — D9220A PRA ANESTHESIA: Mod: CRNA,,, | Performed by: NURSE ANESTHETIST, CERTIFIED REGISTERED

## 2023-04-24 PROCEDURE — D9220A PRA ANESTHESIA: ICD-10-PCS | Mod: CRNA,,, | Performed by: NURSE ANESTHETIST, CERTIFIED REGISTERED

## 2023-04-24 PROCEDURE — 25000003 PHARM REV CODE 250: Performed by: INTERNAL MEDICINE

## 2023-04-24 PROCEDURE — 12000002 HC ACUTE/MED SURGE SEMI-PRIVATE ROOM

## 2023-04-24 PROCEDURE — D9220A PRA ANESTHESIA: ICD-10-PCS | Mod: ANES,,, | Performed by: STUDENT IN AN ORGANIZED HEALTH CARE EDUCATION/TRAINING PROGRAM

## 2023-04-24 PROCEDURE — 25000003 PHARM REV CODE 250: Performed by: STUDENT IN AN ORGANIZED HEALTH CARE EDUCATION/TRAINING PROGRAM

## 2023-04-24 PROCEDURE — 63600175 PHARM REV CODE 636 W HCPCS: Performed by: INTERNAL MEDICINE

## 2023-04-24 PROCEDURE — 25000003 PHARM REV CODE 250: Performed by: NURSE ANESTHETIST, CERTIFIED REGISTERED

## 2023-04-24 PROCEDURE — 43239 EGD BIOPSY SINGLE/MULTIPLE: CPT | Performed by: INTERNAL MEDICINE

## 2023-04-24 RX ORDER — PANTOPRAZOLE SODIUM 40 MG/1
40 TABLET, DELAYED RELEASE ORAL 2 TIMES DAILY
Status: DISCONTINUED | OUTPATIENT
Start: 2023-04-24 | End: 2023-04-25 | Stop reason: HOSPADM

## 2023-04-24 RX ORDER — PROPOFOL 10 MG/ML
VIAL (ML) INTRAVENOUS
Status: DISCONTINUED | OUTPATIENT
Start: 2023-04-24 | End: 2023-04-24

## 2023-04-24 RX ADMIN — PROPOFOL 30 MG: 10 INJECTION, EMULSION INTRAVENOUS at 10:04

## 2023-04-24 RX ADMIN — PROPOFOL 100 MG: 10 INJECTION, EMULSION INTRAVENOUS at 10:04

## 2023-04-24 RX ADMIN — SIMETHICONE 80 MG: 80 TABLET, CHEWABLE ORAL at 09:04

## 2023-04-24 RX ADMIN — SODIUM CHLORIDE: 900 INJECTION, SOLUTION INTRAVENOUS at 10:04

## 2023-04-24 RX ADMIN — LOPERAMIDE HYDROCHLORIDE 2 MG: 2 CAPSULE ORAL at 05:04

## 2023-04-24 RX ADMIN — METHYLPREDNISOLONE SODIUM SUCCINATE 40 MG: 40 INJECTION, POWDER, FOR SOLUTION INTRAMUSCULAR; INTRAVENOUS at 07:04

## 2023-04-24 RX ADMIN — SODIUM CHLORIDE, SODIUM LACTATE, POTASSIUM CHLORIDE, AND CALCIUM CHLORIDE: .6; .31; .03; .02 INJECTION, SOLUTION INTRAVENOUS at 11:04

## 2023-04-24 RX ADMIN — LOPERAMIDE HYDROCHLORIDE 2 MG: 2 CAPSULE ORAL at 04:04

## 2023-04-24 RX ADMIN — METOPROLOL SUCCINATE 50 MG: 50 TABLET, FILM COATED, EXTENDED RELEASE ORAL at 08:04

## 2023-04-24 RX ADMIN — HYDROCODONE BITARTRATE AND ACETAMINOPHEN 1 TABLET: 5; 325 TABLET ORAL at 09:04

## 2023-04-24 RX ADMIN — ZOLPIDEM TARTRATE 10 MG: 5 TABLET, COATED ORAL at 09:04

## 2023-04-24 RX ADMIN — ATORVASTATIN CALCIUM 20 MG: 20 TABLET, FILM COATED ORAL at 09:04

## 2023-04-24 RX ADMIN — LOPERAMIDE HYDROCHLORIDE 2 MG: 2 CAPSULE ORAL at 12:04

## 2023-04-24 RX ADMIN — PROPOFOL 50 MG: 10 INJECTION, EMULSION INTRAVENOUS at 10:04

## 2023-04-24 RX ADMIN — METHYLPREDNISOLONE SODIUM SUCCINATE 40 MG: 40 INJECTION, POWDER, FOR SOLUTION INTRAMUSCULAR; INTRAVENOUS at 11:04

## 2023-04-24 RX ADMIN — PANTOPRAZOLE SODIUM 40 MG: 40 TABLET, DELAYED RELEASE ORAL at 05:04

## 2023-04-24 RX ADMIN — PROMETHAZINE HYDROCHLORIDE 25 MG: 25 TABLET ORAL at 11:04

## 2023-04-24 RX ADMIN — SODIUM CHLORIDE, SODIUM LACTATE, POTASSIUM CHLORIDE, AND CALCIUM CHLORIDE: .6; .31; .03; .02 INJECTION, SOLUTION INTRAVENOUS at 12:04

## 2023-04-24 RX ADMIN — LOPERAMIDE HYDROCHLORIDE 2 MG: 2 CAPSULE ORAL at 09:04

## 2023-04-24 NOTE — TRANSFER OF CARE
"Anesthesia Transfer of Care Note    Patient: Jacoby Jean-Baptiste    Procedure(s) Performed: Procedure(s) (LRB):  EGD (ESOPHAGOGASTRODUODENOSCOPY) (N/A)    Patient location: GI    Anesthesia Type: MAC    Transport from OR: Transported from OR on 2-3 L/min O2 by NC with adequate spontaneous ventilation    Post pain: adequate analgesia    Post assessment: no apparent anesthetic complications    Post vital signs: stable    Level of consciousness: awake    Nausea/Vomiting: no nausea/vomiting    Complications: none    Transfer of care protocol was followed      Last vitals:   Visit Vitals  /73   Pulse (!) 57   Temp 36.5 °C (97.7 °F)   Resp 16   Ht 5' 5" (1.651 m)   Wt 104.7 kg (230 lb 13.2 oz)   SpO2 98%   BMI 38.41 kg/m²     "

## 2023-04-24 NOTE — [employer]ATION PATIENT INSTRUCTIONS
Discharge Summary/Instructions after an Endoscopic Procedure  Patient Name: Jacoby Jean-aBptiste  Patient MRN: [id number]  Patient YOB: 1975 Monday, April 24, 2023  Shan Jean III, MD  RESTRICTIONS:  During your procedure today, you received medications for sedation.  These   medications may affect your judgment, balance and coordination.  Therefore,   for 24 hours, you have the following restrictions:   - DO NOT drive a car, operate machinery, make legal/financial decisions,   sign important papers or drink alcohol.    ACTIVITY:  Today: no heavy lifting, straining or running due to procedural   sedation/anesthesia.  The following day: return to full activity including work.  DIET:  Eat and drink normally unless instructed otherwise.     TREATMENT FOR COMMON SIDE EFFECTS:  - Mild abdominal pain, nausea, belching, bloating or excessive gas:  rest,   eat lightly and use a heating pad.  - Sore Throat: treat with throat lozenges and/or gargle with warm salt   water.  - Because air was used during the procedure, expelling large amounts of air   from your rectum or belching is normal.  - If a bowel prep was taken, you may not have a bowel movement for 1-3 days.    This is normal.  SYMPTOMS TO WATCH FOR AND REPORT TO YOUR PHYSICIAN:  1. Abdominal pain or bloating, other than gas cramps.  2. Chest pain.  3. Back pain.  4. Signs of infection such as: chills or fever occurring within 24 hours   after the procedure.  5. Rectal bleeding, which would show as bright red, maroon, or black stools.   (A tablespoon of blood from the rectum is not serious, especially if   hemorrhoids are present.)  6. Vomiting.  7. Weakness or dizziness.  GO DIRECTLY TO THE NEAREST EMERGENCY ROOM IF YOU HAVE ANY OF THE FOLLOWING:      Difficulty breathing              Chills and/or fever over 101 F   Persistent vomiting and/or vomiting blood   Severe abdominal pain   Severe chest pain   Black, tarry stools   Bleeding- more than one  tablespoon   Any other symptom or condition that you feel may need urgent attention  Your doctor recommends these additional instructions:  If any biopsies were taken, your doctors clinic will contact you in 1 to 2   weeks with any results.  - Return patient to [facility] for ongoing care.   - PPI BID x 1 month, minimize NSAIDs and as long as outpatient remicade   approved, likely d/c tomorrow  For questions, problems or results please call your physician - Shan Jean III, MD at Work:  [phone].  [facility], EMERGENCY ROOM PHONE NUMBER: [phone]  IF A COMPLICATION OR EMERGENCY SITUATION ARISES AND YOU ARE UNABLE TO REACH   YOUR PHYSICIAN - GO DIRECTLY TO THE EMERGENCY ROOM.  Shan Jean III, MD  4/24/2023 10:31:06 AM  This report has been verified and signed electronically.  Dear patient,  As a result of recent federal legislation (The Federal Cures Act), you may   receive lab or pathology results from your procedure in your [facility]   account before your physician is able to contact you. Your physician or   their representative will relay the results to you with their   recommendations at their soonest availability.  Thank you,  PROVATION

## 2023-04-24 NOTE — ANESTHESIA POSTPROCEDURE EVALUATION
Anesthesia Post Evaluation    Patient: Jacoby Jean-Baptiste    Procedure(s) Performed: Procedure(s) (LRB):  EGD (ESOPHAGOGASTRODUODENOSCOPY) (N/A)    Final Anesthesia Type: general      Patient location during evaluation: GI PACU  Patient participation: Yes- Able to Participate  Level of consciousness: awake and alert  Post-procedure vital signs: reviewed and stable  Pain management: adequate  Airway patency: patent    PONV status at discharge: No PONV  Anesthetic complications: no      Cardiovascular status: hemodynamically stable  Respiratory status: unassisted, spontaneous ventilation and room air  Hydration status: euvolemic  Follow-up not needed.          Vitals Value Taken Time   /73 04/24/23 1030   Temp 36.5 °C (97.7 °F) 04/24/23 1009   Pulse 56 04/24/23 1032   Resp 13 04/24/23 1032   SpO2 100 % 04/24/23 1032         No case tracking events are documented in the log.      Pain/Kusum Score: Pain Rating Prior to Med Admin: 8 (4/23/2023  8:46 PM)  Pain Rating Post Med Admin: 0 (4/23/2023  9:26 PM)  Kusum Score: 10 (4/24/2023 10:37 AM)

## 2023-04-24 NOTE — PLAN OF CARE
Patient continues to require hospitalization.       04/24/23 0644   Discharge Reassessment   Assessment Type Discharge Planning Reassessment   Discharge Plan discussed with: Patient   Discharge Plan A Home   Discharge Plan B Home   Why the patient remains in the hospital Requires continued medical care

## 2023-04-24 NOTE — ASSESSMENT & PLAN NOTE
Patient with acute flare of his ulcerative colitis now improving  Plan to monitor overnight and hope to d/c tomorrow  Pending outpatient remicade approval  Continue IV steroids  Continue IV hydration  EGD done today and shows pre pyloric ulcer  Remicade pending outpatient approval  Pain and nausea control  Imodium

## 2023-04-24 NOTE — SUBJECTIVE & OBJECTIVE
Interval History:  diarrhea improving. 1cm pre pyloric ulcer on EGD today. Started protonix BID. Hope to discharge tomorrow. He is feeling better overall. Asking for a diet. Ready to go home as soon as medically cleared    Review of Systems   All other systems reviewed and are negative.  Objective:     Vital Signs (Most Recent):  Temp: 97.8 °F (36.6 °C) (04/24/23 1050)  Pulse: (!) 55 (04/24/23 1050)  Resp: 16 (04/24/23 1050)  BP: (!) 144/90 (04/24/23 1050)  SpO2: 95 % (04/24/23 1050) Vital Signs (24h Range):  Temp:  [97.6 °F (36.4 °C)-98.5 °F (36.9 °C)] 97.8 °F (36.6 °C)  Pulse:  [55-75] 55  Resp:  [9-19] 16  SpO2:  [93 %-100 %] 95 %  BP: (102-160)/(55-90) 144/90     Weight: 104.7 kg (230 lb 13.2 oz)  Body mass index is 38.41 kg/m².    Intake/Output Summary (Last 24 hours) at 4/24/2023 1107  Last data filed at 4/24/2023 1034  Gross per 24 hour   Intake 3654.9 ml   Output --   Net 3654.9 ml        Physical Exam  Vitals reviewed.   Constitutional:       Appearance: Normal appearance.   HENT:      Head: Normocephalic and atraumatic.      Nose: Nose normal.      Mouth/Throat:      Mouth: Mucous membranes are moist.   Eyes:      Pupils: Pupils are equal, round, and reactive to light.   Cardiovascular:      Rate and Rhythm: Normal rate and regular rhythm.      Pulses: Normal pulses.      Heart sounds: Normal heart sounds. No murmur heard.    No friction rub. No gallop.   Pulmonary:      Effort: Pulmonary effort is normal. No respiratory distress.      Breath sounds: Normal breath sounds.   Abdominal:      General: Abdomen is flat. Bowel sounds are normal. There is no distension.      Palpations: Abdomen is soft.      Tenderness: There is no abdominal tenderness.   Musculoskeletal:         General: No swelling. Normal range of motion.      Cervical back: Normal range of motion and neck supple.   Skin:     General: Skin is warm and dry.   Neurological:      General: No focal deficit present.      Mental Status: He is alert  and oriented to person, place, and time.   Psychiatric:         Mood and Affect: Mood normal.         Behavior: Behavior normal.         Thought Content: Thought content normal.         Judgment: Judgment normal.       Significant Labs: All pertinent labs within the past 24 hours have been reviewed.    Significant Imaging: I have reviewed all pertinent imaging results/findings within the past 24 hours.

## 2023-04-24 NOTE — PROGRESS NOTES
[facility] Medicine  Progress Note    Patient Name: Jacoby Jean-Baptiste  MRN: [id number]  Patient Class: IP- Inpatient   Admission Date: 4/20/2023  Length of Stay: 3 days  Attending Physician: Hector Nuno MD  Primary Care Provider: Hunter Aguilar III, MD        Subjective:     Principal Problem:Ulcerative colitis        HPI:  Mr. Jean-Baptiste is a 48-year-old male who presented to the ED with chief complaint of diarrhea.  Patient states he has not been feeling well since January 2023, he was diagnosed with C diff, status post 2 courses of Dificid, 2 courses of vancomycin, on 3/21/23 underwent fecal transplantation at [facility].  Patient with history of ulcerative colitis, followed by Dr. Jean, states in the last week he was started on budesonide, he is awaiting insurance authorization for initiation of biologics.  Unclear family history of IBD.  He states he continues to have diarrhea, quantified as 4-5 bowel movements per day, liquid, specks of blood seen, abdominal pain, central and lower, intermittent, worsened by eating, nausea with emesis.  States he has not been eating much over the last week.  Subjective chills, does not check temperature, no chest pain, shortness a of breath, urinary symptoms, skin rashes/lesions.  Current smoker 1 pack per day.  In the ED afebrile with T-max 97.7°, vital stable.  Labs with WBC 9.58, hemoglobin 9.6, BUN/creatinine 26/1.2, FOBT positive, chest x-ray clear, CT abdomen and pelvis with diffuse colonic wall thickening from proximal transverse colon to splenic flexure, descending and sigmoid colon.  He received Dilaudid 0.5 mg, Solu-Medrol 40 mg and 1 L fluid bolus.  Case discussed with ED provider who requested admission, states he discussed with on-call Gastroenterology, strongly advise against antibiotics with history of C diff, recommends Solu-Medrol 40 mg q.12h hours, will be seen in consultation.  Plan of care discussed with  patient.  No visitors at bedside.      Overview/Hospital Course:  No notes on file    Interval History:  diarrhea improving. 1cm pre pyloric ulcer on EGD today. Started protonix BID. Hope to discharge tomorrow. He is feeling better overall. Asking for a diet. Ready to go home as soon as medically cleared    Review of Systems   All other systems reviewed and are negative.  Objective:     Vital Signs (Most Recent):  Temp: 97.8 °F (36.6 °C) (04/24/23 1050)  Pulse: (!) 55 (04/24/23 1050)  Resp: 16 (04/24/23 1050)  BP: (!) 144/90 (04/24/23 1050)  SpO2: 95 % (04/24/23 1050) Vital Signs (24h Range):  Temp:  [97.6 °F (36.4 °C)-98.5 °F (36.9 °C)] 97.8 °F (36.6 °C)  Pulse:  [55-75] 55  Resp:  [9-19] 16  SpO2:  [93 %-100 %] 95 %  BP: (102-160)/(55-90) 144/90     Weight: 104.7 kg (230 lb 13.2 oz)  Body mass index is 38.41 kg/m².    Intake/Output Summary (Last 24 hours) at 4/24/2023 1107  Last data filed at 4/24/2023 1034  Gross per 24 hour   Intake 3654.9 ml   Output --   Net 3654.9 ml        Physical Exam  Vitals reviewed.   Constitutional:       Appearance: Normal appearance.   HENT:      Head: Normocephalic and atraumatic.      Nose: Nose normal.      Mouth/Throat:      Mouth: Mucous membranes are moist.   Eyes:      Pupils: Pupils are equal, round, and reactive to light.   Cardiovascular:      Rate and Rhythm: Normal rate and regular rhythm.      Pulses: Normal pulses.      Heart sounds: Normal heart sounds. No murmur heard.    No friction rub. No gallop.   Pulmonary:      Effort: Pulmonary effort is normal. No respiratory distress.      Breath sounds: Normal breath sounds.   Abdominal:      General: Abdomen is flat. Bowel sounds are normal. There is no distension.      Palpations: Abdomen is soft.      Tenderness: There is no abdominal tenderness.   Musculoskeletal:         General: No swelling. Normal range of motion.      Cervical back: Normal range of motion and neck supple.   Skin:     General: Skin is warm and dry.    Neurological:      General: No focal deficit present.      Mental Status: He is alert and oriented to person, place, and time.   Psychiatric:         Mood and Affect: Mood normal.         Behavior: Behavior normal.         Thought Content: Thought content normal.         Judgment: Judgment normal.       Significant Labs: All pertinent labs within the past 24 hours have been reviewed.    Significant Imaging: I have reviewed all pertinent imaging results/findings within the past 24 hours.      Assessment/Plan:      * Ulcerative colitis with acute flare  Patient with acute flare of his ulcerative colitis now improving  Plan to monitor overnight and hope to d/c tomorrow  Pending outpatient remicade approval  Continue IV steroids  Continue IV hydration  EGD done today and shows pre pyloric ulcer  Remicade pending outpatient approval  Pain and nausea control  Imodium      PUD (peptic ulcer disease)  Pre pyloric ulcer on EGD  Biopsies taken and pending  BID protonix ordered      History of Clostridioides difficile colitis  Avoid antibiotics if possible  C Diff ruled out      Lower GI bleed secondary to colitis  Monitor for GI bleeding  IV iron for iron deficiency      Anemia  Patient with iron-deficiency anemia  Complete IV Iron infusions      Type 2 diabetes mellitus without complication, without long-term current use of insulin  Patient's FSGs are controlled on current medication regimen.  Last A1c reviewed-   Lab Results   Component Value Date    HGBA1C 4.8 04/21/2023     Most recent fingerstick glucose reviewed- No results for input(s): POCTGLUCOSE in the last 24 hours.  Current correctional scale  Medium  Maintain anti-hyperglycemic dose as follows-   Antihyperglycemics (From admission, onward)    None        Hold Oral hypoglycemics while patient is in the hospital.    Hyperlipidemia  Statin        VTE Risk Mitigation (From admission, onward)         Ordered     IP VTE HIGH RISK PATIENT  Once         04/20/23 2011      Place sequential compression device  Until discontinued         04/20/23 2011                Discharge Planning   GERARDO: 4/24/2023     Code Status: Full Code   Is the patient medically ready for discharge?:     Reason for patient still in hospital (select all that apply): Treatment  Discharge Plan A: Home                  Hector Nuno MD  Department of Hospital Medicine   [facility]

## 2023-04-24 NOTE — NURSING
Patient having multiple episodes of diarrhea, Dr. Oshea notified and authorized morning dose of loperamide be given early.

## 2023-04-24 NOTE — ANESTHESIA PREPROCEDURE EVALUATION
04/24/2023  Jacoby Jean-Baptiste is a 48 y.o., male.      Patient Active Problem List   Diagnosis    Insomnia    Prediabetes    Morbid obesity    Tobacco use    History of supraventricular tachycardia    H/O cardiac radiofrequency ablation    Hyperlipidemia    Type 2 diabetes mellitus without complication, without long-term current use of insulin    Positive colorectal cancer screening using Cologuard test    Polyp of colon    Ulcerative colitis with complication    BMI 45.0-49.9, adult    Palpitations    Anxiety    Colitis    Anemia    Leucocytosis    Bilateral nephrolithiasis, non obstructing    C. difficile diarrhea    Dehydration    Lower GI bleed secondary to colitis    Ulcerative colitis with acute flare    Dental caries    History of Clostridioides difficile colitis    Status post fecal microbiota transplant       Past Surgical History:   Procedure Laterality Date    CARDIAC ELECTROPHYSIOLOGY MAPPING AND ABLATION  2017    SVT    CHOLECYSTECTOMY  2011    COLONOSCOPY N/A 5/3/2022    Procedure: COLONOSCOPY;  Surgeon: Shan Jean III, MD;  Location: [facility];  Service: Endoscopy;  Laterality: N/A;    COLONOSCOPY WITH FECAL MICROBIOTA TRANSFER N/A 3/21/2023    Procedure: COLONOSCOPY, WITH FECAL MICROBIOTA TRANSFER;  Surgeon: David Millan MD;  Location: [facility];  Service: Endoscopy;  Laterality: N/A;    GANGLION CYST EXCISION Left 1992    UMBILICAL HERNIA REPAIR  2011    with mesh        Tobacco Use:  The patient  reports that he has been smoking cigarettes. He has a 30.00 pack-year smoking history. He has never used smokeless tobacco.     Results for orders placed or performed in visit on 07/15/22   IN OFFICE EKG 12-LEAD (to [place])    Collection Time: 07/15/22  4:43 PM    Narrative    Test Reason : R00.2,    Vent. Rate : 080 BPM     Atrial Rate : 080 BPM     P-R Int  : 160 ms          QRS Dur : 076 ms      QT Int : 398 ms       P-R-T Axes : 048 023 030 degrees     QTc Int : 459 ms    Normal sinus rhythm  Normal ECG  When compared with ECG of 29-APR-2022 10:31,  No significant change was found  Confirmed by Jimbo RENTERIA, Alonzo KIM ([id number]) on 7/20/2022 7:48:50 PM    Referred By:  CS           Confirmed By:Alonzo Choi MD        Imaging Results          CT Abdomen Pelvis With Contrast (Final result)  Result time 04/20/23 15:23:10    Final result by Rickie Brown MD (04/20/23 15:23:10)                 Narrative:    CMS MANDATED QUALITY DATA - CT RADIATION  436    All CT scans at this facility utilize dose modulation, iterative reconstruction, and/or weight based dosing when appropriate to reduce radiation dose to as low as reasonably achievable.    CT ABDOMEN PELVIS WITH IV CONTRAST    CLINICAL HISTORY:  48 years Male LLQ abdominal pain    COMPARISON: CT abdomen and pelvis January 11, 2023    FINDINGS: Lung bases are clear. Bone window images show no acute or aggressive osseous abnormality.    No focal hepatic lesion. Gallbladder is absent. No bile duct dilation. Spleen is unremarkable. Left upper quadrant splenule. Pancreas is within normal limits. Duodenal diverticulum noted. No adrenal lesion. Multiple bilateral renal calculi including nonobstructing 6 mm left mid pole renal calculus, nonobstructing 6 mm left lower pole renal calculus, nonobstructing 5 mm right lower pole renal calculus. Ureters are normal in caliber. Urinary bladder is within normal limits.    Stomach is largely collapsed. No evidence of small bowel obstruction. Normal-size appendix containing small calcified appendicoliths. Diffuse colonic wall thickening beginning at the proximal transverse colon extending through the splenic flexure and descending and sigmoid colon where there is mild surrounding pericolonic inflammatory stranding.    No evidence of perforation. No intra-abdominal abscess. Calcified focus  of fat necrosis left lower quadrant, stable. No pathologically enlarged left limits. Scattered aortoiliac atherosclerotic calcification.    IMPRESSION:    Colonic wall thickening extending from the transverse colon through the rectum, with descending and sigmoid colon pericolonic stranding. Findings are consistent with acute infectious or inflammatory colitis in the appropriate clinical setting.    Bilateral nonobstructing nephrolithiasis.    Status post cholecystectomy.    Electronically signed by:  Rickie Brown MD  4/20/2023 3:23 PM CDT Workstation: [id number]                             X-Ray Chest AP Portable (Final result)  Result time 04/20/23 13:05:40    Final result by Rickie Brown MD (04/20/23 13:05:40)                 Narrative:    XR CHEST 1 VIEW    CLINICAL HISTORY:  48 years Male Congestion General Illness (Since January, body aches/cough/doesn't feel well)    COMPARISON: August 20, 2016    FINDINGS: Cardiac silhouette size is within normal limits for portable technique. Patient is rotated. No airspace consolidation. No pleural effusion or pneumothorax. No acute osseous abnormality.    IMPRESSION:    No acute pulmonary pathology.    Electronically signed by:  Rickie Brown MD  4/20/2023 1:05 PM CDT Workstation: [id number]                               Lab Results   Component Value Date    WBC 8.76 04/24/2023    HGB 9.2 (L) 04/24/2023    HCT 28.5 (L) 04/24/2023    MCV 89 04/24/2023     04/24/2023     BMP  Lab Results   Component Value Date     (L) 04/24/2023    K 4.5 04/24/2023     04/24/2023    CO2 22 (L) 04/24/2023    BUN 20 04/24/2023    CREATININE 0.9 04/24/2023    CALCIUM 7.9 (L) 04/24/2023    ANIONGAP 10 04/24/2023     (H) 04/24/2023     (H) 04/23/2023     04/22/2023       No results found for this or any previous visit.              Pre-op Assessment    I have reviewed the Patient Summary Reports.     I have reviewed the Nursing Notes. I have  reviewed the NPO Status.   I have reviewed the Medications.     Review of Systems  Anesthesia Hx:  No problems with previous Anesthesia  Denies Family Hx of Anesthesia complications.   Denies Personal Hx of Anesthesia complications.   Social:  Smoker, Alcohol Use    Hematology/Oncology:     Oncology Normal    -- Anemia:   EENT/Dental:EENT/Dental Normal   Cardiovascular:   Hypertension, poorly controlled Dysrhythmias hyperlipidemia ECG has been reviewed. History of supraventricular tachycardia    H/O cardiac radiofrequency ablation    Patient without cardiologist follow up for 5 years      Pulmonary:  Pulmonary Normal    Renal/:   Chronic Renal Disease    Hepatic/GI:   PUD, Colitis  Lower GI bleed secondary to colitis    C. difficile diarrhea   Musculoskeletal:  Musculoskeletal Normal    Neurological:  Neurology Normal    Endocrine:   Diabetes, poorly controlled, type 2  Morbid Obesity / BMI > 40  Dermatological:  Skin Normal    Psych:   Psychiatric History anxiety Sleep Disorder and Insomnia. Sleep Disorder and Insomnia.        Physical Exam  General: Well nourished, Cooperative, Alert and Oriented    Airway:  Mallampati: III   Mouth Opening: Normal  TM Distance: > 6 cm  Tongue: Normal  Neck ROM: Normal ROM    Dental:  Periodontal disease    Chest/Lungs:  Clear to auscultation, Normal Respiratory Rate    Heart:  Rate: Normal  Rhythm: Regular Rhythm        Anesthesia Plan  Type of Anesthesia, risks & benefits discussed:    Anesthesia Type: Gen Natural Airway  Intra-op Monitoring Plan: Standard ASA Monitors  Induction:  IV  Informed Consent: Informed consent signed with the Patient and all parties understand the risks and agree with anesthesia plan.  All questions answered.   ASA Score: 3  Anesthesia Plan Notes:       Propofol    POM mask     Ready For Surgery From Anesthesia Perspective.     .

## 2023-04-25 ENCOUNTER — TELEPHONE (OUTPATIENT)
Dept: FAMILY MEDICINE | Facility: CLINIC | Age: 48
End: 2023-04-25
Payer: COMMERCIAL

## 2023-04-25 VITALS
SYSTOLIC BLOOD PRESSURE: 121 MMHG | RESPIRATION RATE: 18 BRPM | HEIGHT: 65 IN | OXYGEN SATURATION: 100 % | TEMPERATURE: 98 F | DIASTOLIC BLOOD PRESSURE: 66 MMHG | BODY MASS INDEX: 38.45 KG/M2 | WEIGHT: 230.81 LBS | HEART RATE: 65 BPM

## 2023-04-25 LAB
ALBUMIN SERPL BCP-MCNC: 2.2 G/DL (ref 3.5–5.2)
ALP SERPL-CCNC: 65 U/L (ref 55–135)
ALT SERPL W/O P-5'-P-CCNC: 59 U/L (ref 10–44)
ANION GAP SERPL CALC-SCNC: 9 MMOL/L (ref 8–16)
AST SERPL-CCNC: 45 U/L (ref 10–40)
BILIRUB SERPL-MCNC: 0.4 MG/DL (ref 0.1–1)
BUN SERPL-MCNC: 15 MG/DL (ref 6–20)
CALCIUM SERPL-MCNC: 8.1 MG/DL (ref 8.7–10.5)
CHLORIDE SERPL-SCNC: 100 MMOL/L (ref 95–110)
CO2 SERPL-SCNC: 25 MMOL/L (ref 23–29)
CREAT SERPL-MCNC: 0.6 MG/DL (ref 0.5–1.4)
ERYTHROCYTE [DISTWIDTH] IN BLOOD BY AUTOMATED COUNT: 15.9 % (ref 11.5–14.5)
EST. GFR  (NO RACE VARIABLE): >60 ML/MIN/1.73 M^2
GLUCOSE SERPL-MCNC: 121 MG/DL (ref 70–110)
GLUCOSE SERPL-MCNC: 131 MG/DL (ref 70–110)
GLUCOSE SERPL-MCNC: 157 MG/DL (ref 70–110)
HCT VFR BLD AUTO: 26 % (ref 40–54)
HGB BLD-MCNC: 8.4 G/DL (ref 14–18)
MCH RBC QN AUTO: 29 PG (ref 27–31)
MCHC RBC AUTO-ENTMCNC: 32.3 G/DL (ref 32–36)
MCV RBC AUTO: 90 FL (ref 82–98)
PLATELET # BLD AUTO: 276 K/UL (ref 150–450)
PMV BLD AUTO: 9.3 FL (ref 9.2–12.9)
POTASSIUM SERPL-SCNC: 4.4 MMOL/L (ref 3.5–5.1)
PROT SERPL-MCNC: 5.6 G/DL (ref 6–8.4)
RBC # BLD AUTO: 2.9 M/UL (ref 4.6–6.2)
SODIUM SERPL-SCNC: 134 MMOL/L (ref 136–145)
WBC # BLD AUTO: 10.38 K/UL (ref 3.9–12.7)

## 2023-04-25 PROCEDURE — 80053 COMPREHEN METABOLIC PANEL: CPT | Performed by: STUDENT IN AN ORGANIZED HEALTH CARE EDUCATION/TRAINING PROGRAM

## 2023-04-25 PROCEDURE — 63600175 PHARM REV CODE 636 W HCPCS: Performed by: INTERNAL MEDICINE

## 2023-04-25 PROCEDURE — 25000003 PHARM REV CODE 250: Performed by: INTERNAL MEDICINE

## 2023-04-25 PROCEDURE — 85027 COMPLETE CBC AUTOMATED: CPT | Performed by: STUDENT IN AN ORGANIZED HEALTH CARE EDUCATION/TRAINING PROGRAM

## 2023-04-25 PROCEDURE — 36415 COLL VENOUS BLD VENIPUNCTURE: CPT | Performed by: STUDENT IN AN ORGANIZED HEALTH CARE EDUCATION/TRAINING PROGRAM

## 2023-04-25 PROCEDURE — 25000003 PHARM REV CODE 250: Performed by: STUDENT IN AN ORGANIZED HEALTH CARE EDUCATION/TRAINING PROGRAM

## 2023-04-25 RX ORDER — FERROUS SULFATE 325(65) MG
325 TABLET ORAL DAILY
Qty: 90 TABLET | Refills: 0 | Status: SHIPPED | OUTPATIENT
Start: 2023-04-25 | End: 2023-07-24

## 2023-04-25 RX ORDER — PREDNISONE 20 MG/1
TABLET ORAL
Qty: 49 TABLET | Refills: 0 | Status: SHIPPED | OUTPATIENT
Start: 2023-04-25 | End: 2023-05-30

## 2023-04-25 RX ORDER — PANTOPRAZOLE SODIUM 40 MG/1
40 TABLET, DELAYED RELEASE ORAL 2 TIMES DAILY
Qty: 180 TABLET | Refills: 3 | Status: SHIPPED | OUTPATIENT
Start: 2023-04-25 | End: 2023-10-30 | Stop reason: SDUPTHER

## 2023-04-25 RX ADMIN — PANTOPRAZOLE SODIUM 40 MG: 40 TABLET, DELAYED RELEASE ORAL at 06:04

## 2023-04-25 RX ADMIN — METOPROLOL SUCCINATE 50 MG: 50 TABLET, FILM COATED, EXTENDED RELEASE ORAL at 08:04

## 2023-04-25 RX ADMIN — METHYLPREDNISOLONE SODIUM SUCCINATE 40 MG: 40 INJECTION, POWDER, FOR SOLUTION INTRAMUSCULAR; INTRAVENOUS at 08:04

## 2023-04-25 RX ADMIN — SODIUM CHLORIDE, SODIUM LACTATE, POTASSIUM CHLORIDE, AND CALCIUM CHLORIDE: .6; .31; .03; .02 INJECTION, SOLUTION INTRAVENOUS at 09:04

## 2023-04-25 RX ADMIN — LOPERAMIDE HYDROCHLORIDE 2 MG: 2 CAPSULE ORAL at 12:04

## 2023-04-25 RX ADMIN — HYDROCODONE BITARTRATE AND ACETAMINOPHEN 1 TABLET: 5; 325 TABLET ORAL at 03:04

## 2023-04-25 RX ADMIN — LOPERAMIDE HYDROCHLORIDE 2 MG: 2 CAPSULE ORAL at 08:04

## 2023-04-25 NOTE — NURSING
Discharge instructions given to pt, pt verbalized understanding.   PIV removed. Pt leaving with personal belongings.   Medicine to be picked up from pharmacy.   Pt walking out with tech.

## 2023-04-25 NOTE — HOSPITAL COURSE
Patient is a 48-year-old gentleman with a history of ulcerative colitis, C diff infection, peptic ulcer disease, type 2 diabetes who presents with increasing episodes of diarrhea and was found to have ulcerative colitis flare.  He was admitted and treated with IV steroids.  He would recently been started on oral budesonide with Dr. Jean.  IV steroids assisted with inflammation and improved his symptoms.  He was also given a loading dose of Remicade during this admission and will plan to continue Remicade for maintenance therapy as an outpatient.  He underwent EGD for further evaluation of his abdominal pain symptoms with Dr. Jean and was found to have a pre-pyloric ulcer.  He will start Protonix 40 mg twice daily and will continue this for 90 days.  He will follow up with Dr. Jean for ongoing evaluation as an outpatient.  They are working on insurance approval for Remicade as an outpatient.  He will continue on prednisone taper as prescribed.  His symptoms are improving at this time.  Additionally, he was found to have severe iron-deficiency anemia.  He was given IV iron and will continue iron supplementation as an outpatient.  I have reviewed the discharge plan of care instructions with the patient on the day of discharge.  He was discharged in good condition with plans for close outpatient follow-up with Gastroenterology and primary care physician.

## 2023-04-25 NOTE — PLAN OF CARE
Patient cleared for discharge from case management standpoint.   Patient needs GI clearance prior to discharge.    ALENA scheduled hospital follow up on placed on AVS.       04/25/23 1204   Final Note   Assessment Type Final Discharge Note   Anticipated Discharge Disposition Home   What phone number can be called within the next 1-3 days to see how you are doing after discharge? [phone]   Hospital Resources/Appts/Education Provided Appointments scheduled and added to AVS;Community resources provided;Provided patient/caregiver with written discharge plan information;Provided education on problems/symptoms using teachback

## 2023-04-25 NOTE — PROGRESS NOTES
RN notified Dr. Jean pt okay to d/c home with GI clearance. Okay for pt to go home on po prednisone. Dr. Nuno notified, order placed.

## 2023-04-25 NOTE — DISCHARGE SUMMARY
[facility]  Discharge Summary      Patient Name: Jacoby Jean-Baptiste  MRN: [id number]  [facility]: [id number]  Patient Class: IP- Inpatient  Admission Date: 4/20/2023  Hospital Length of Stay: 4 days  Discharge Date and Time:  04/25/2023 2:58 PM  Attending Physician: No att. providers found   Discharging Provider: Hector Nuno MD  Primary Care Provider: Hunter Aguilar III, MD    Primary Care Team: Networked reference to record PCT     HPI:   Mr. Jean-Baptiste is a 48-year-old male who presented to the ED with chief complaint of diarrhea.  Patient states he has not been feeling well since January 2023, he was diagnosed with C diff, status post 2 courses of Dificid, 2 courses of vancomycin, on 3/21/23 underwent fecal transplantation at [facility].  Patient with history of ulcerative colitis, followed by Dr. Jean, states in the last week he was started on budesonide, he is awaiting insurance authorization for initiation of biologics.  Unclear family history of IBD.  He states he continues to have diarrhea, quantified as 4-5 bowel movements per day, liquid, specks of blood seen, abdominal pain, central and lower, intermittent, worsened by eating, nausea with emesis.  States he has not been eating much over the last week.  Subjective chills, does not check temperature, no chest pain, shortness a of breath, urinary symptoms, skin rashes/lesions.  Current smoker 1 pack per day.  In the ED afebrile with T-max 97.7°, vital stable.  Labs with WBC 9.58, hemoglobin 9.6, BUN/creatinine 26/1.2, FOBT positive, chest x-ray clear, CT abdomen and pelvis with diffuse colonic wall thickening from proximal transverse colon to splenic flexure, descending and sigmoid colon.  He received Dilaudid 0.5 mg, Solu-Medrol 40 mg and 1 L fluid bolus.  Case discussed with ED provider who requested admission, states he discussed with on-call Gastroenterology, strongly advise against antibiotics with history  of C diff, recommends Solu-Medrol 40 mg q.12h hours, will be seen in consultation.  Plan of care discussed with patient.  No visitors at bedside.      Procedure(s) (LRB):  EGD (ESOPHAGOGASTRODUODENOSCOPY) (N/A)      Hospital Course:   Patient is a 48-year-old gentleman with a history of ulcerative colitis, C diff infection, peptic ulcer disease, type 2 diabetes who presents with increasing episodes of diarrhea and was found to have ulcerative colitis flare.  He was admitted and treated with IV steroids.  He would recently been started on oral budesonide with Dr. Jean.  IV steroids assisted with inflammation and improved his symptoms.  He was also given a loading dose of Remicade during this admission and will plan to continue Remicade for maintenance therapy as an outpatient.  He underwent EGD for further evaluation of his abdominal pain symptoms with Dr. Jean and was found to have a pre-pyloric ulcer.  He will start Protonix 40 mg twice daily and will continue this for 90 days.  He will follow up with Dr. Jean for ongoing evaluation as an outpatient.  They are working on insurance approval for Remicade as an outpatient.  He will continue on prednisone taper as prescribed.  His symptoms are improving at this time.  Additionally, he was found to have severe iron-deficiency anemia.  He was given IV iron and will continue iron supplementation as an outpatient.  I have reviewed the discharge plan of care instructions with the patient on the day of discharge.  He was discharged in good condition with plans for close outpatient follow-up with Gastroenterology and primary care physician.       Goals of Care Treatment Preferences:  Code Status: Full Code      Consults:   Consults (From admission, onward)        Status Ordering Provider     Inpatient consult to Gastroenterology  Once        Provider:  Enoc Kebede MD    Completed KATHY LAYTON          No new Assessment & Plan notes have been filed under  this hospital service since the last note was generated.  Service: Hospital Medicine    Final Active Diagnoses:    Diagnosis Date Noted POA    PRINCIPAL PROBLEM:  Ulcerative colitis with acute flare [K51.90] 04/20/2023 Yes    PUD (peptic ulcer disease) [K27.9] 04/24/2023 Unknown    Dehydration [E86.0] 04/20/2023 Yes    Lower GI bleed secondary to colitis [K92.2] 04/20/2023 Yes    Dental caries [K02.9] 04/20/2023 Yes     Chronic    History of Clostridioides difficile colitis [Z86.19] 04/20/2023 Not Applicable     Chronic    Status post fecal microbiota transplant [Z92.89] 04/20/2023 Yes    Anemia [D64.9] 01/11/2023 Yes     Chronic    Bilateral nephrolithiasis, non obstructing [N20.0] 01/11/2023 Yes     Chronic    Anxiety [F41.9] 09/21/2022 Yes    Hyperlipidemia [E78.5] 01/29/2022 Yes    Type 2 diabetes mellitus without complication, without long-term current use of insulin [E11.9] 01/29/2022 Yes    Insomnia [G47.00] 02/03/2021 Yes    Morbid obesity [E66.01] 02/03/2021 Yes    Tobacco use [Z72.0] 02/03/2021 Yes    History of supraventricular tachycardia [Z86.79] 02/03/2021 Not Applicable    H/O cardiac radiofrequency ablation [Z98.890] 02/03/2021 Not Applicable      Problems Resolved During this Admission:       Discharged Condition: good    Disposition: Home or Self Care    Follow Up:   Follow-up Information     Hunter Aguilar III, MD. Schedule an appointment as soon as possible for a visit in 1 week(s).    Specialty: Family Medicine  Why: office to contact patient to schedule hospital follow up  Contact information:  [address]  [address]  [place] [address] [phone]             Shan Jean III, MD. Schedule an appointment as soon as possible for a visit on 4/27/2023.    Specialty: Gastroenterology  Why: at 3:30pm  Contact information:  [address]  [place] [address] [phone]                       Patient Instructions:      Diet Adult Regular     No dressing needed      Activity as tolerated       Significant Diagnostic Studies: Labs:   BMP:   Recent Labs   Lab 04/24/23  0509 04/25/23  0453   * 131*   * 134*   K 4.5 4.4    100   CO2 22* 25   BUN 20 15   CREATININE 0.9 0.6   CALCIUM 7.9* 8.1*   , CBC   Recent Labs   Lab 04/24/23  0509 04/25/23  0453   WBC 8.76 10.38   HGB 9.2* 8.4*   HCT 28.5* 26.0*    276    and All labs within the past 24 hours have been reviewed    Pending Diagnostic Studies:     Procedure Component Value Units Date/Time    Specimen to Pathology - Surgery [[id number]] Collected: 04/24/23 1036    Order Status: Sent Lab Status: No result     Specimen: Tissue     Stool Exam-Ova,Cysts,Parasites [[id number]] Collected: 04/20/23 1233    Order Status: Sent Lab Status: In process Updated: 04/20/23 1240    Specimen: Stool          Medications:  Reconciled Home Medications:      Medication List      START taking these medications    ferrous sulfate 325 mg (65 mg iron) Tab tablet  Commonly known as: IRON  Take 1 tablet (325 mg total) by mouth once daily.     pantoprazole 40 MG tablet  Commonly known as: PROTONIX  Take 1 tablet (40 mg total) by mouth 2 (two) times daily.     predniSONE 20 MG tablet  Commonly known as: DELTASONE  Take 2 tablets (40 mg total) by mouth once daily for 14 days, THEN 1.5 tablets (30 mg total) once daily for 7 days, THEN 1 tablet (20 mg total) once daily for 7 days, THEN 0.5 tablets (10 mg total) once daily for 7 days.  Start taking on: April 25, 2023        CHANGE how you take these medications    metoprolol succinate 50 MG 24 hr tablet  Commonly known as: TOPROL-XL  TAKE 1 TABLET(50 MG) BY MOUTH EVERY DAY  What changed: when to take this        CONTINUE taking these medications    budesonide 3 mg capsule  Commonly known as: ENTOCORT EC  Take 9 mg by mouth once daily.     famotidine 40 MG tablet  Commonly known as: PEPCID  Take 40 mg by mouth every evening.     metFORMIN 500 MG ER 24hr tablet  Commonly known as:  GLUCOPHAGE-XR  TAKE 1 TABLET(500 MG) BY MOUTH TWICE DAILY WITH MEALS     ondansetron 4 MG Tbdl  Commonly known as: ZOFRAN-ODT  Take 4 mg by mouth every 4 to 6 hours as needed.     OZEMPIC 1 mg/dose (4 mg/3 mL)  Generic drug: semaglutide  INJECT 1 MG UNDER THE SKIN EVERY 7 DAYS     potassium chloride SA 20 MEQ tablet  Commonly known as: K-DUR,KLOR-CON  Take 1 tablet (20 mEq total) by mouth once daily.     sertraline 50 MG tablet  Commonly known as: ZOLOFT  Take 1 tablet (50 mg total) by mouth once daily.     simvastatin 20 MG tablet  Commonly known as: ZOCOR  TAKE 1 TABLET(20 MG) BY MOUTH EVERY NIGHT     zolpidem 10 mg Tab  Commonly known as: AMBIEN  Take 1 tablet (10 mg total) by mouth nightly as needed (insomnia).        STOP taking these medications    balsalazide 750 mg capsule  Commonly known as: COLAZAL     diphenoxylate-atropine 2.5-0.025 mg 2.5-0.025 mg per tablet  Commonly known as: LOMOTIL     magnesium oxide 400 mg (241.3 mg magnesium) tablet  Commonly known as: MAG-OX     ondansetron 4 MG tablet  Commonly known as: ZOFRAN     SUTAB 1.479-0.188- 0.225 gram tablet  Generic drug: sod sulf-pot chloride-mag sulf            Indwelling Lines/Drains at time of discharge:   Lines/Drains/Airways     None                 Time spent on the discharge of patient: 50 minutes         Hector Nuno MD  Department of Hospital Medicine  [facility]

## 2023-04-25 NOTE — TELEPHONE ENCOUNTER
----- Message from Jonelle Nava sent at 4/25/2023 12:03 PM CDT -----  Contact: [facility]  Type: Needs Medical Advice         Who Called: [facility]  Best Call Back Number:[phone]  Additional Information: Requesting a call back regarding  Pt is needing a 1 week Hospital Follow up. Pt is being discharged today   Please Advise- Thank you

## 2023-04-26 ENCOUNTER — PATIENT MESSAGE (OUTPATIENT)
Dept: FAMILY MEDICINE | Facility: CLINIC | Age: 48
End: 2023-04-26
Payer: COMMERCIAL

## 2023-04-28 ENCOUNTER — OFFICE VISIT (OUTPATIENT)
Dept: FAMILY MEDICINE | Facility: CLINIC | Age: 48
End: 2023-04-28
Payer: COMMERCIAL

## 2023-04-28 VITALS
DIASTOLIC BLOOD PRESSURE: 74 MMHG | BODY MASS INDEX: 39.72 KG/M2 | HEIGHT: 65 IN | OXYGEN SATURATION: 99 % | SYSTOLIC BLOOD PRESSURE: 124 MMHG | WEIGHT: 238.38 LBS | TEMPERATURE: 97 F | HEART RATE: 82 BPM

## 2023-04-28 DIAGNOSIS — K27.9 PUD (PEPTIC ULCER DISEASE): ICD-10-CM

## 2023-04-28 DIAGNOSIS — R29.818 OTHER SYMPTOMS AND SIGNS INVOLVING THE NERVOUS SYSTEM: ICD-10-CM

## 2023-04-28 DIAGNOSIS — D50.9 IRON DEFICIENCY ANEMIA, UNSPECIFIED IRON DEFICIENCY ANEMIA TYPE: Chronic | ICD-10-CM

## 2023-04-28 DIAGNOSIS — Z72.0 TOBACCO USE: ICD-10-CM

## 2023-04-28 DIAGNOSIS — R47.81 SLURRED SPEECH: ICD-10-CM

## 2023-04-28 DIAGNOSIS — K51.00 ULCERATIVE PANCOLITIS WITHOUT COMPLICATION: Primary | ICD-10-CM

## 2023-04-28 DIAGNOSIS — R53.1 LEFT-SIDED WEAKNESS: ICD-10-CM

## 2023-04-28 PROCEDURE — 99214 OFFICE O/P EST MOD 30 MIN: CPT | Mod: S$GLB,,, | Performed by: FAMILY MEDICINE

## 2023-04-28 PROCEDURE — 99214 PR OFFICE/OUTPT VISIT, EST, LEVL IV, 30-39 MIN: ICD-10-PCS | Mod: S$GLB,,, | Performed by: FAMILY MEDICINE

## 2023-04-28 PROCEDURE — 3072F PR LOW RISK FOR RETINOPATHY: ICD-10-PCS | Mod: S$GLB,,, | Performed by: FAMILY MEDICINE

## 2023-04-28 PROCEDURE — 3072F LOW RISK FOR RETINOPATHY: CPT | Mod: S$GLB,,, | Performed by: FAMILY MEDICINE

## 2023-04-28 RX ORDER — FLUCONAZOLE 100 MG/1
TABLET ORAL
COMMUNITY
Start: 2023-04-27 | End: 2023-05-30

## 2023-04-28 RX ORDER — PROMETHAZINE HYDROCHLORIDE 25 MG/1
25-50 TABLET ORAL EVERY 8 HOURS PRN
COMMUNITY
Start: 2023-04-18 | End: 2023-11-17 | Stop reason: DRUGHIGH

## 2023-04-29 LAB
O+P SPEC MICRO: NORMAL
O+P STL CONC: NORMAL

## 2023-05-01 PROBLEM — R53.1 LEFT-SIDED WEAKNESS: Status: ACTIVE | Noted: 2023-05-01

## 2023-05-02 NOTE — PROGRESS NOTES
Subjective:       Patient ID: Jacoby Jean-Baptiste is a 48 y.o. male.    Chief Complaint: Hospital Follow Up    Coordination of care visit.  Hospitalized April 20th through the 25th.  Ulcerative colitis flare up.  Also history of Clostridium difficile.  On Remicade now.  As well as prednisone taper.  20 mg b.i.d. at present.  Peptic ulcer disease.  Given Protonix.  Iron deficiency anemia given IV iron.  He has some subtle left-sided weakness and some slurred speech started 7 or 8 days ago.  Left side feels weaker to him.  No falls.  Balance is poor.  No chest pain or palpitations.  Hemoglobin was low at 9.6.  He is a smoker.  He has lost 100 lb.  Discontinued alcohol.  He does not really want to cook discontinue smoking half present on top of everything else.      Physical examination.  Vital signs noted.  Overweight male no acute distress.  Neck is without bruit.  Chest clear.  Heart regular rate and rhythm.  Abdomen bowel sounds are positive soft nontender.  Extremities no significant edema.  He did have great difficulty standing from the chair had use his arms.  In still had trouble.  Has a slight limp.      Objective:        Assessment:       1. Ulcerative pancolitis without complication    2. PUD (peptic ulcer disease)    3. Iron deficiency anemia, unspecified iron deficiency anemia type    4. Left-sided weakness    5. Slurred speech    6. Tobacco use    7. Other symptoms and signs involving the nervous system        Plan:       Ulcerative pancolitis without complication    PUD (peptic ulcer disease)    Iron deficiency anemia, unspecified iron deficiency anemia type    Left-sided weakness    Slurred speech    Tobacco use    Other symptoms and signs involving the nervous system  -     MRI Brain Without Contrast; Future; Expected date: 04/28/2023    Will continue his current medication.  Appears to have a new neurologic deficit.  Will get an MRI of the brain to rule out CVA.  Continue his iron.  Continue his  medications for ulcerative colitis.

## 2023-05-05 ENCOUNTER — PATIENT MESSAGE (OUTPATIENT)
Dept: FAMILY MEDICINE | Facility: CLINIC | Age: 48
End: 2023-05-05
Payer: COMMERCIAL

## 2023-05-09 RX ORDER — POTASSIUM CHLORIDE 20 MEQ/1
20 TABLET, EXTENDED RELEASE ORAL DAILY
Qty: 90 TABLET | Refills: 0 | Status: SHIPPED | OUTPATIENT
Start: 2023-05-09 | End: 2023-11-17

## 2023-05-09 NOTE — TELEPHONE ENCOUNTER
No care due was identified.  [facility] Embedded Care Due Messages. Reference number: [id number].   5/09/2023 8:45:38 AM CDT

## 2023-05-11 ENCOUNTER — HOSPITAL ENCOUNTER (OUTPATIENT)
Dept: RADIOLOGY | Facility: HOSPITAL | Age: 48
Discharge: HOME | End: 2023-05-11
Attending: FAMILY MEDICINE
Payer: COMMERCIAL

## 2023-05-11 DIAGNOSIS — R29.818 OTHER SYMPTOMS AND SIGNS INVOLVING THE NERVOUS SYSTEM: ICD-10-CM

## 2023-05-11 PROCEDURE — 70551 MRI BRAIN STEM W/O DYE: CPT | Mod: TC,PO

## 2023-05-12 RX ORDER — SEMAGLUTIDE 1.34 MG/ML
1 INJECTION, SOLUTION SUBCUTANEOUS
Qty: 3 EACH | Refills: 0 | Status: SHIPPED | OUTPATIENT
Start: 2023-05-12 | End: 2023-06-09 | Stop reason: SDUPTHER

## 2023-05-12 NOTE — TELEPHONE ENCOUNTER
No care due was identified.  [facility] Embedded Care Due Messages. Reference number: [id number].   5/12/2023 9:00:05 AM CDT

## 2023-05-23 ENCOUNTER — OFFICE VISIT (OUTPATIENT)
Dept: FAMILY MEDICINE | Facility: CLINIC | Age: 48
End: 2023-05-23
Payer: COMMERCIAL

## 2023-05-23 VITALS
HEART RATE: 87 BPM | DIASTOLIC BLOOD PRESSURE: 78 MMHG | BODY MASS INDEX: 39.15 KG/M2 | OXYGEN SATURATION: 100 % | TEMPERATURE: 98 F | HEIGHT: 65 IN | WEIGHT: 235 LBS | SYSTOLIC BLOOD PRESSURE: 120 MMHG

## 2023-05-23 DIAGNOSIS — R53.83 FATIGUE, UNSPECIFIED TYPE: ICD-10-CM

## 2023-05-23 DIAGNOSIS — E78.5 HYPERLIPIDEMIA, UNSPECIFIED HYPERLIPIDEMIA TYPE: ICD-10-CM

## 2023-05-23 DIAGNOSIS — G47.00 INSOMNIA, UNSPECIFIED TYPE: ICD-10-CM

## 2023-05-23 DIAGNOSIS — R00.2 PALPITATIONS: Primary | ICD-10-CM

## 2023-05-23 DIAGNOSIS — E11.9 TYPE 2 DIABETES MELLITUS WITHOUT COMPLICATION, WITHOUT LONG-TERM CURRENT USE OF INSULIN: ICD-10-CM

## 2023-05-23 DIAGNOSIS — Z86.79 HISTORY OF SUPRAVENTRICULAR TACHYCARDIA: ICD-10-CM

## 2023-05-23 PROCEDURE — 1160F RVW MEDS BY RX/DR IN RCRD: CPT | Mod: CPTII,S$GLB,,

## 2023-05-23 PROCEDURE — 3074F PR MOST RECENT SYSTOLIC BLOOD PRESSURE < 130 MM HG: ICD-10-PCS | Mod: CPTII,S$GLB,,

## 2023-05-23 PROCEDURE — 1159F MED LIST DOCD IN RCRD: CPT | Mod: CPTII,S$GLB,,

## 2023-05-23 PROCEDURE — 3044F PR MOST RECENT HEMOGLOBIN A1C LEVEL <7.0%: ICD-10-PCS | Mod: CPTII,S$GLB,,

## 2023-05-23 PROCEDURE — 99214 PR OFFICE/OUTPT VISIT, EST, LEVL IV, 30-39 MIN: ICD-10-PCS | Mod: S$GLB,,,

## 2023-05-23 PROCEDURE — 3008F PR BODY MASS INDEX (BMI) DOCUMENTED: ICD-10-PCS | Mod: CPTII,S$GLB,,

## 2023-05-23 PROCEDURE — 1160F PR REVIEW ALL MEDS BY PRESCRIBER/CLIN PHARMACIST DOCUMENTED: ICD-10-PCS | Mod: CPTII,S$GLB,,

## 2023-05-23 PROCEDURE — 3072F LOW RISK FOR RETINOPATHY: CPT | Mod: CPTII,S$GLB,,

## 2023-05-23 PROCEDURE — 3072F PR LOW RISK FOR RETINOPATHY: ICD-10-PCS | Mod: CPTII,S$GLB,,

## 2023-05-23 PROCEDURE — 93010 EKG 12-LEAD: ICD-10-PCS | Mod: S$GLB,,, | Performed by: SPECIALIST

## 2023-05-23 PROCEDURE — 3044F HG A1C LEVEL LT 7.0%: CPT | Mod: CPTII,S$GLB,,

## 2023-05-23 PROCEDURE — 93010 ELECTROCARDIOGRAM REPORT: CPT | Mod: S$GLB,,, | Performed by: SPECIALIST

## 2023-05-23 PROCEDURE — 1111F DSCHRG MED/CURRENT MED MERGE: CPT | Mod: CPTII,S$GLB,,

## 2023-05-23 PROCEDURE — 3078F DIAST BP <80 MM HG: CPT | Mod: CPTII,S$GLB,,

## 2023-05-23 PROCEDURE — 3074F SYST BP LT 130 MM HG: CPT | Mod: CPTII,S$GLB,,

## 2023-05-23 PROCEDURE — 1111F PR DISCHARGE MEDS RECONCILED W/ CURRENT OUTPATIENT MED LIST: ICD-10-PCS | Mod: CPTII,S$GLB,,

## 2023-05-23 PROCEDURE — 93005 ELECTROCARDIOGRAM TRACING: CPT | Mod: S$GLB,,,

## 2023-05-23 PROCEDURE — 99214 OFFICE O/P EST MOD 30 MIN: CPT | Mod: S$GLB,,,

## 2023-05-23 PROCEDURE — 1159F PR MEDICATION LIST DOCUMENTED IN MEDICAL RECORD: ICD-10-PCS | Mod: CPTII,S$GLB,,

## 2023-05-23 PROCEDURE — 3008F BODY MASS INDEX DOCD: CPT | Mod: CPTII,S$GLB,,

## 2023-05-23 PROCEDURE — 3078F PR MOST RECENT DIASTOLIC BLOOD PRESSURE < 80 MM HG: ICD-10-PCS | Mod: CPTII,S$GLB,,

## 2023-05-23 PROCEDURE — 93005 EKG 12-LEAD: ICD-10-PCS | Mod: S$GLB,,,

## 2023-05-23 RX ORDER — ZOLPIDEM TARTRATE 10 MG/1
10 TABLET ORAL NIGHTLY PRN
Qty: 30 TABLET | Refills: 2 | Status: SHIPPED | OUTPATIENT
Start: 2023-05-23 | End: 2023-08-09 | Stop reason: SDUPTHER

## 2023-05-23 NOTE — PROGRESS NOTES
Subjective:       Patient ID: Jacoby Jean-Baptiste is a 48 y.o. male.    Chief Complaint: Follow-up    Patient presents to clinic for refill of ambien.  He is also complaining of palpitations. He has had heart palpitations for about 6-8 months. Some shortness of breath but no chest pain. Yesterday did ekg on his watch when he had palpitations and he states it showed afib. Has appointment with Dr. Roman next week. History of svt with ablation. Takes metoprolol.  Heart rate at home usually stays in 90s. Had Holter Monitor done several months ago for palpitations which was normal at that time. Other medical history of anxiety, hyperlipidemia, UC, type 2 DM, insomnia, and SOTERO.  Today he denies palpitations, chest pain , or shortness of breath. Recently started on Remicade for UC. Is also on course of oral steroids. He states he no longer has diarrhea and is doing much better. Denies bloody stools.  He is taking Ambien nightly for insomnia which works well and is needing a refill.            Review of patient's allergies indicates:  No Known Allergies  Social Determinants of Health     Tobacco Use: High Risk    Smoking Tobacco Use: Every Day    Smokeless Tobacco Use: Never    Passive Exposure: Not on file   Alcohol Use: Not At Risk    Frequency of Alcohol Consumption: Never    Average Number of Drinks: Patient does not drink    Frequency of Binge Drinking: Never   Financial Resource Strain: Low Risk     Difficulty of Paying Living Expenses: Not very hard   Food Insecurity: No Food Insecurity    Worried About Running Out of Food in the Last Year: Never true    Ran Out of Food in the Last Year: Never true   Transportation Needs: No Transportation Needs    Lack of Transportation (Medical): No    Lack of Transportation (Non-Medical): No   Physical Activity: Inactive    Days of Exercise per Week: 0 days    Minutes of Exercise per Session: 0 min   Stress: No Stress Concern Present    Feeling of Stress : Only a  little   Social Connections: Socially Isolated    Frequency of Communication with Friends and Family: Twice a week    Frequency of Social Gatherings with Friends and Family: Twice a week    Attends Buddhist Services: Never    Active Member of Clubs or Organizations: No    Attends Club or Organization Meetings: Never    Marital Status: Never    Housing Stability: Low Risk     Unable to Pay for Housing in the Last Year: No    Number of Places Lived in the Last Year: 1    Unstable Housing in the Last Year: No   Depression: Low Risk     Last PHQ Score: 0      Past Medical History:   Diagnosis Date    Diabetes mellitus 01/2022    Hyperlipidemia 2015    Hypertension 2015    Insomnia 2015      Past Surgical History:   Procedure Laterality Date    CARDIAC ELECTROPHYSIOLOGY MAPPING AND ABLATION  2017    SVT    CHOLECYSTECTOMY  2011    COLONOSCOPY N/A 5/3/2022    Procedure: COLONOSCOPY;  Surgeon: Shan Jean III, MD;  Location: [facility];  Service: Endoscopy;  Laterality: N/A;    COLONOSCOPY WITH FECAL MICROBIOTA TRANSFER N/A 3/21/2023    Procedure: COLONOSCOPY, WITH FECAL MICROBIOTA TRANSFER;  Surgeon: David Millan MD;  Location: [facility];  Service: Endoscopy;  Laterality: N/A;    ESOPHAGOGASTRODUODENOSCOPY N/A 4/24/2023    Procedure: EGD (ESOPHAGOGASTRODUODENOSCOPY);  Surgeon: Shan Jean III, MD;  Location: [facility];  Service: Endoscopy;  Laterality: N/A;    GANGLION CYST EXCISION Left 1992    UMBILICAL HERNIA REPAIR  2011    with mesh      Social History     Socioeconomic History    Marital status: Single         Current Outpatient Medications:     budesonide (ENTOCORT EC) 3 mg capsule, Take 9 mg by mouth once daily., Disp: , Rfl:     famotidine (PEPCID) 40 MG tablet, Take 40 mg by mouth every evening., Disp: , Rfl:     ferrous sulfate (IRON) 325 mg (65 mg iron) Tab tablet, Take 1 tablet (325 mg total) by mouth once daily., Disp: 90 tablet, Rfl: 0    fluconazole  (DIFLUCAN) 100 MG tablet, Take by mouth., Disp: , Rfl:     metFORMIN (GLUCOPHAGE-XR) 500 MG ER 24hr tablet, TAKE 1 TABLET(500 MG) BY MOUTH TWICE DAILY WITH MEALS (Patient taking differently: Take 500 mg by mouth 2 (two) times daily with meals.), Disp: 180 tablet, Rfl: 1    metoprolol succinate (TOPROL-XL) 50 MG 24 hr tablet, TAKE 1 TABLET(50 MG) BY MOUTH EVERY DAY (Patient taking differently: Take 50 mg by mouth once daily.), Disp: 90 tablet, Rfl: 1    ondansetron (ZOFRAN-ODT) 4 MG TbDL, Take 4 mg by mouth every 4 to 6 hours as needed., Disp: , Rfl:     pantoprazole (PROTONIX) 40 MG tablet, Take 1 tablet (40 mg total) by mouth 2 (two) times daily., Disp: 180 tablet, Rfl: 3    potassium chloride SA (K-DUR,KLOR-CON) 20 MEQ tablet, Take 1 tablet (20 mEq total) by mouth once daily., Disp: 90 tablet, Rfl: 0    predniSONE (DELTASONE) 20 MG tablet, Take 2 tablets (40 mg total) by mouth once daily for 14 days, THEN 1.5 tablets (30 mg total) once daily for 7 days, THEN 1 tablet (20 mg total) once daily for 7 days, THEN 0.5 tablets (10 mg total) once daily for 7 days., Disp: 49 tablet, Rfl: 0    promethazine (PHENERGAN) 25 MG tablet, Take 25-50 mg by mouth every 8 (eight) hours as needed., Disp: , Rfl:     semaglutide (OZEMPIC) 1 mg/dose (4 mg/3 mL), Inject 1 mg into the skin every 7 days., Disp: 3 each, Rfl: 0    sertraline (ZOLOFT) 50 MG tablet, Take 1 tablet (50 mg total) by mouth once daily., Disp: 90 tablet, Rfl: 1    simvastatin (ZOCOR) 20 MG tablet, TAKE 1 TABLET(20 MG) BY MOUTH EVERY NIGHT (Patient taking differently: Take 20 mg by mouth every evening.), Disp: 90 tablet, Rfl: 1    zolpidem (AMBIEN) 10 mg Tab, Take 1 tablet (10 mg total) by mouth nightly as needed (insomnia)., Disp: 30 tablet, Rfl: 2  No current facility-administered medications for this visit.    Facility-Administered Medications Ordered in Other Visits:     lactated ringers infusion, , Intravenous, Continuous, David Millan MD, Last  Rate: 75 mL/hr at 03/21/23 1252, New Bag at 03/21/23 1252    Lab Results   Component Value Date    WBC 10.38 04/25/2023    HGB 8.4 (L) 04/25/2023    HCT 26.0 (L) 04/25/2023     04/25/2023    CHOL 103 (L) 01/04/2023    TRIG 86 01/04/2023    HDL 41 01/04/2023    ALT 59 (H) 04/25/2023    AST 45 (H) 04/25/2023     (L) 04/25/2023    K 4.4 04/25/2023     04/25/2023    CREATININE 0.6 04/25/2023    BUN 15 04/25/2023    CO2 25 04/25/2023    TSH 1.510 01/11/2023    PSA 0.24 03/10/2021    HGBA1C 4.8 04/21/2023    MICROALBUR 1.2 01/26/2022       Review of Systems   Constitutional:  Negative for chills and fever.   Respiratory:  Positive for shortness of breath.    Cardiovascular:  Positive for palpitations. Negative for chest pain.   Gastrointestinal:  Negative for abdominal pain, blood in stool, diarrhea and vomiting.     Objective:      Physical Exam  Vitals reviewed.   Constitutional:       Appearance: Normal appearance.   Cardiovascular:      Rate and Rhythm: Normal rate and regular rhythm.      Pulses: Normal pulses.           Radial pulses are 2+ on the right side and 2+ on the left side.        Dorsalis pedis pulses are 2+ on the right side and 2+ on the left side.      Heart sounds: Normal heart sounds, S1 normal and S2 normal.      Comments: EKG in office NSR with v rate of 87  Pulmonary:      Effort: Pulmonary effort is normal.      Breath sounds: Normal breath sounds.   Abdominal:      General: Bowel sounds are normal.      Palpations: Abdomen is soft.      Tenderness: There is no abdominal tenderness. There is no guarding.   Musculoskeletal:      Right lower leg: No edema.      Left lower leg: No edema.   Feet:      Right foot:      Protective Sensation: 8 sites tested.  8 sites sensed.      Skin integrity: Skin integrity normal.      Toenail Condition: Right toenails are normal.      Left foot:      Protective Sensation: 8 sites tested.  8 sites sensed.      Skin integrity: Skin integrity normal.       Toenail Condition: Left toenails are normal.   Neurological:      Mental Status: He is alert.       Assessment:       1. Palpitations    2. Hyperlipidemia, unspecified hyperlipidemia type    3. Fatigue, unspecified type    4. Type 2 diabetes mellitus without complication, without long-term current use of insulin    5. History of supraventricular tachycardia    6. Insomnia, unspecified type        Plan:       Jacoby was seen today for follow-up.    Diagnoses and all orders for this visit:    Palpitations  -     IN OFFICE EKG 12-LEAD (to Muse)  -     Holter monitor - 48 hour; Future  -     CBC Auto Differential; Future  -     Comprehensive Metabolic Panel; Future  -     TSH; Future    Hyperlipidemia, unspecified hyperlipidemia type    Fatigue, unspecified type  -     TSH; Future    Type 2 diabetes mellitus without complication, without long-term current use of insulin  -     Ambulatory referral/consult to Ophthalmology; Future    History of supraventricular tachycardia    Insomnia, unspecified type  -     zolpidem (AMBIEN) 10 mg Tab; Take 1 tablet (10 mg total) by mouth nightly as needed (insomnia).    EKG today for palpitations is NSR with v rate of 87. Will also do 48 hour Holter monitor. Keep appointment with Dr. Roman next week. If has palpitations, chest pain, shortness of breath or any other concerning symptom he was instructed to go to ER. Have labs done.  Diabetic foot exam completed today. Referral to Dr. Quintero for Diabetic eye exam.   Ambien refilled for 3 months.  checked.   Follow up in 3 months or sooner if needed.

## 2023-05-24 ENCOUNTER — LAB VISIT (OUTPATIENT)
Dept: LAB | Facility: HOSPITAL | Age: 48
End: 2023-05-24
Payer: COMMERCIAL

## 2023-05-24 DIAGNOSIS — R00.2 PALPITATIONS: ICD-10-CM

## 2023-05-24 DIAGNOSIS — R53.83 FATIGUE, UNSPECIFIED TYPE: ICD-10-CM

## 2023-05-24 LAB
ALBUMIN SERPL BCP-MCNC: 3.7 G/DL (ref 3.5–5.2)
ALP SERPL-CCNC: 52 U/L (ref 55–135)
ALT SERPL W/O P-5'-P-CCNC: 26 U/L (ref 10–44)
ANION GAP SERPL CALC-SCNC: 7 MMOL/L (ref 8–16)
ANISOCYTOSIS BLD QL SMEAR: ABNORMAL
AST SERPL-CCNC: 15 U/L (ref 10–40)
BASOPHILS # BLD AUTO: 0.04 K/UL (ref 0–0.2)
BASOPHILS NFR BLD: 0.3 % (ref 0–1.9)
BILIRUB SERPL-MCNC: 0.5 MG/DL (ref 0.1–1)
BUN SERPL-MCNC: 25 MG/DL (ref 6–20)
CALCIUM SERPL-MCNC: 9.2 MG/DL (ref 8.7–10.5)
CHLORIDE SERPL-SCNC: 105 MMOL/L (ref 95–110)
CO2 SERPL-SCNC: 27 MMOL/L (ref 23–29)
CREAT SERPL-MCNC: 0.7 MG/DL (ref 0.5–1.4)
DIFFERENTIAL METHOD: ABNORMAL
EOSINOPHIL # BLD AUTO: 0 K/UL (ref 0–0.5)
EOSINOPHIL NFR BLD: 0.1 % (ref 0–8)
ERYTHROCYTE [DISTWIDTH] IN BLOOD BY AUTOMATED COUNT: ABNORMAL % (ref 11.5–14.5)
EST. GFR  (NO RACE VARIABLE): >60 ML/MIN/1.73 M^2
GLUCOSE SERPL-MCNC: 110 MG/DL (ref 70–110)
HCT VFR BLD AUTO: 39.1 % (ref 40–54)
HGB BLD-MCNC: 11.9 G/DL (ref 14–18)
IMM GRANULOCYTES # BLD AUTO: 0.1 K/UL (ref 0–0.04)
IMM GRANULOCYTES NFR BLD AUTO: 0.7 % (ref 0–0.5)
LYMPHOCYTES # BLD AUTO: 1.1 K/UL (ref 1–4.8)
LYMPHOCYTES NFR BLD: 7.9 % (ref 18–48)
MCH RBC QN AUTO: 31.7 PG (ref 27–31)
MCHC RBC AUTO-ENTMCNC: 30.4 G/DL (ref 32–36)
MCV RBC AUTO: 104 FL (ref 82–98)
MONOCYTES # BLD AUTO: 0.3 K/UL (ref 0.3–1)
MONOCYTES NFR BLD: 2.4 % (ref 4–15)
NEUTROPHILS # BLD AUTO: 12 K/UL (ref 1.8–7.7)
NEUTROPHILS NFR BLD: 88.6 % (ref 38–73)
NRBC BLD-RTO: 0 /100 WBC
PLATELET # BLD AUTO: 331 K/UL (ref 150–450)
PLATELET BLD QL SMEAR: ABNORMAL
PMV BLD AUTO: 9.4 FL (ref 9.2–12.9)
POTASSIUM SERPL-SCNC: 5.1 MMOL/L (ref 3.5–5.1)
PROT SERPL-MCNC: 6.8 G/DL (ref 6–8.4)
RBC # BLD AUTO: 3.75 M/UL (ref 4.6–6.2)
SODIUM SERPL-SCNC: 139 MMOL/L (ref 136–145)
TSH SERPL DL<=0.005 MIU/L-ACNC: 0.55 UIU/ML (ref 0.34–5.6)
WBC # BLD AUTO: 13.6 K/UL (ref 3.9–12.7)

## 2023-05-24 PROCEDURE — 85025 COMPLETE CBC W/AUTO DIFF WBC: CPT

## 2023-05-24 PROCEDURE — 84443 ASSAY THYROID STIM HORMONE: CPT

## 2023-05-24 PROCEDURE — 36415 COLL VENOUS BLD VENIPUNCTURE: CPT

## 2023-05-24 PROCEDURE — 80053 COMPREHEN METABOLIC PANEL: CPT

## 2023-05-30 ENCOUNTER — OFFICE VISIT (OUTPATIENT)
Dept: CARDIOLOGY | Facility: CLINIC | Age: 48
End: 2023-05-30
Payer: COMMERCIAL

## 2023-05-30 ENCOUNTER — TELEPHONE (OUTPATIENT)
Dept: FAMILY MEDICINE | Facility: CLINIC | Age: 48
End: 2023-05-30
Payer: COMMERCIAL

## 2023-05-30 VITALS
BODY MASS INDEX: 40.48 KG/M2 | WEIGHT: 243 LBS | OXYGEN SATURATION: 98 % | HEART RATE: 93 BPM | RESPIRATION RATE: 16 BRPM | DIASTOLIC BLOOD PRESSURE: 74 MMHG | SYSTOLIC BLOOD PRESSURE: 118 MMHG | HEIGHT: 65 IN

## 2023-05-30 DIAGNOSIS — E11.9 TYPE 2 DIABETES MELLITUS WITHOUT COMPLICATION, WITHOUT LONG-TERM CURRENT USE OF INSULIN: ICD-10-CM

## 2023-05-30 DIAGNOSIS — E66.01 MORBID OBESITY: ICD-10-CM

## 2023-05-30 DIAGNOSIS — E78.00 PURE HYPERCHOLESTEROLEMIA: ICD-10-CM

## 2023-05-30 DIAGNOSIS — R00.2 PALPITATIONS: ICD-10-CM

## 2023-05-30 DIAGNOSIS — Z86.79 HISTORY OF SUPRAVENTRICULAR TACHYCARDIA: ICD-10-CM

## 2023-05-30 DIAGNOSIS — Z72.0 TOBACCO USE: ICD-10-CM

## 2023-05-30 PROCEDURE — 93000 ELECTROCARDIOGRAM COMPLETE: CPT | Mod: S$GLB,,, | Performed by: INTERNAL MEDICINE

## 2023-05-30 PROCEDURE — 99204 PR OFFICE/OUTPT VISIT, NEW, LEVL IV, 45-59 MIN: ICD-10-PCS | Mod: S$GLB,,, | Performed by: INTERNAL MEDICINE

## 2023-05-30 PROCEDURE — 99999 PR PBB SHADOW E&M-EST. PATIENT-LVL IV: ICD-10-PCS | Mod: PBBFAC,,, | Performed by: INTERNAL MEDICINE

## 2023-05-30 PROCEDURE — 99204 OFFICE O/P NEW MOD 45 MIN: CPT | Mod: S$GLB,,, | Performed by: INTERNAL MEDICINE

## 2023-05-30 PROCEDURE — 1160F RVW MEDS BY RX/DR IN RCRD: CPT | Mod: CPTII,S$GLB,, | Performed by: INTERNAL MEDICINE

## 2023-05-30 PROCEDURE — 1159F MED LIST DOCD IN RCRD: CPT | Mod: CPTII,S$GLB,, | Performed by: INTERNAL MEDICINE

## 2023-05-30 PROCEDURE — 3044F HG A1C LEVEL LT 7.0%: CPT | Mod: CPTII,S$GLB,, | Performed by: INTERNAL MEDICINE

## 2023-05-30 PROCEDURE — 3072F PR LOW RISK FOR RETINOPATHY: ICD-10-PCS | Mod: CPTII,S$GLB,, | Performed by: INTERNAL MEDICINE

## 2023-05-30 PROCEDURE — 3078F PR MOST RECENT DIASTOLIC BLOOD PRESSURE < 80 MM HG: ICD-10-PCS | Mod: CPTII,S$GLB,, | Performed by: INTERNAL MEDICINE

## 2023-05-30 PROCEDURE — 3074F SYST BP LT 130 MM HG: CPT | Mod: CPTII,S$GLB,, | Performed by: INTERNAL MEDICINE

## 2023-05-30 PROCEDURE — 3008F PR BODY MASS INDEX (BMI) DOCUMENTED: ICD-10-PCS | Mod: CPTII,S$GLB,, | Performed by: INTERNAL MEDICINE

## 2023-05-30 PROCEDURE — 99999 PR PBB SHADOW E&M-EST. PATIENT-LVL IV: CPT | Mod: PBBFAC,,, | Performed by: INTERNAL MEDICINE

## 2023-05-30 PROCEDURE — 3072F LOW RISK FOR RETINOPATHY: CPT | Mod: CPTII,S$GLB,, | Performed by: INTERNAL MEDICINE

## 2023-05-30 PROCEDURE — 1160F PR REVIEW ALL MEDS BY PRESCRIBER/CLIN PHARMACIST DOCUMENTED: ICD-10-PCS | Mod: CPTII,S$GLB,, | Performed by: INTERNAL MEDICINE

## 2023-05-30 PROCEDURE — 3074F PR MOST RECENT SYSTOLIC BLOOD PRESSURE < 130 MM HG: ICD-10-PCS | Mod: CPTII,S$GLB,, | Performed by: INTERNAL MEDICINE

## 2023-05-30 PROCEDURE — 3078F DIAST BP <80 MM HG: CPT | Mod: CPTII,S$GLB,, | Performed by: INTERNAL MEDICINE

## 2023-05-30 PROCEDURE — 93000 EKG 12-LEAD: ICD-10-PCS | Mod: S$GLB,,, | Performed by: INTERNAL MEDICINE

## 2023-05-30 PROCEDURE — 3008F BODY MASS INDEX DOCD: CPT | Mod: CPTII,S$GLB,, | Performed by: INTERNAL MEDICINE

## 2023-05-30 PROCEDURE — 3044F PR MOST RECENT HEMOGLOBIN A1C LEVEL <7.0%: ICD-10-PCS | Mod: CPTII,S$GLB,, | Performed by: INTERNAL MEDICINE

## 2023-05-30 PROCEDURE — 1159F PR MEDICATION LIST DOCUMENTED IN MEDICAL RECORD: ICD-10-PCS | Mod: CPTII,S$GLB,, | Performed by: INTERNAL MEDICINE

## 2023-05-30 NOTE — ASSESSMENT & PLAN NOTE
He is presently on simvastatin and 20 mg daily maintain low-fat low-cholesterol diet and continue the same.

## 2023-05-30 NOTE — TELEPHONE ENCOUNTER
----- Message from Joanna De La Torre NP sent at 5/25/2023 12:59 PM CDT -----  Labs are stable. Anemia is improving. Follow up as recommended.

## 2023-05-30 NOTE — ASSESSMENT & PLAN NOTE
As above needs further investigation will obtain a Zio monitoring.  Event recording  Is presently on metoprolol succinate 50 mg tablets once daily continue the same if tolerated may consider adding magnesium oxide.

## 2023-05-30 NOTE — PROGRESS NOTES
Subjective:    Patient ID:  Jacoby Jean-Baptiste is a 48 y.o. male patient here for evaluation Establish Care (He is establishing care with cardio, he has a history of svt, afib. Weakness and chest pain)      History of Present Illness:     48-year-old gentleman with history of complex medical problems including ulcerative colitis and recent bout of C diff infection completed 2 courses of vancomycin and 2 courses ofDIFICID AND FINALLY UNDERWENT FECAL TRANSPLANT AT [facility] ON 03/21/2023  Also has been on treatment for UC with Remicade seeking evaluation from cardiac standpoint.  Reportedly he has intermittent episodes of palpitations and is smart phone is detected some arrhythmias labeled as AFib but it appears to be isolated PAC some irregularity.    Patient had history of SVT about 6 years ago and required ablation and has been successful.    Patient denies having chest discomfort no arm neck or jaw pain   Has history of shortness of breath with normal physical activity and fatigue and tiredness associated this.  Never had a syncopal episode  No swelling in the feet currently but he had this in the past.  No PND orthopnea noted.  Her and has 2 pillow orthopnea.      Details of recent hospitalization is as follows.  Discharge Summary        Patient Name: Jacoby Jean-Baptiste  MRN: [id number]  DAYSI: [id number]  Patient Class: IP- Inpatient  Admission Date: 4/20/2023  Hospital Length of Stay: 4 days  Discharge Date and Time:  04/25/2023 2:58 PM  Attending Physician: No att. providers found   Discharging Provider: Hectro Nuno MD  Primary Care Provider: Hunter Aguilar III, MD     Primary Care Team: Networked reference to record PCT      HPI:   Mr. Jean-Baptiste is a 48-year-old male who presented to the ED with chief complaint of diarrhea.  Patient states he has not been feeling well since January 2023, he was diagnosed with C diff, status post 2 courses of Dificid, 2 courses of vancomycin, on 3/21/23 underwent fecal  transplantation at [facility].  Patient with history of ulcerative colitis, followed by Dr. Jean, states in the last week he was started on budesonide, he is awaiting insurance authorization for initiation of biologics.  Unclear family history of IBD.  He states he continues to have diarrhea, quantified as 4-5 bowel movements per day, liquid, specks of blood seen, abdominal pain, central and lower, intermittent, worsened by eating, nausea with emesis.  States he has not been eating much over the last week.  Subjective chills, does not check temperature, no chest pain, shortness a of breath, urinary symptoms, skin rashes/lesions.  Current smoker 1 pack per day.  In the ED afebrile with T-max 97.7°, vital stable.  Labs with WBC 9.58, hemoglobin 9.6, BUN/creatinine 26/1.2, FOBT positive, chest x-ray clear, CT abdomen and pelvis with diffuse colonic wall thickening from proximal transverse colon to splenic flexure, descending and sigmoid colon.  He received Dilaudid 0.5 mg, Solu-Medrol 40 mg and 1 L fluid bolus.  Case discussed with ED provider who requested admission, states he discussed with on-call Gastroenterology, strongly advise against antibiotics with history of C diff, recommends Solu-Medrol 40 mg q.12h hours, will be seen in consultation.  Plan of care discussed with patient.  No visitors at bedside.        Procedure(s) (LRB):  EGD (ESOPHAGOGASTRODUODENOSCOPY) (N/A)       Hospital Course:   Patient is a 48-year-old gentleman with a history of ulcerative colitis, C diff infection, peptic ulcer disease, type 2 diabetes who presents with increasing episodes of diarrhea and was found to have ulcerative colitis flare.  He was admitted and treated with IV steroids.  He would recently been started on oral budesonide with Dr. Jean.  IV steroids assisted with inflammation and improved his symptoms.  He was also given a loading dose of Remicade during this admission and will plan to continue  Remicade for maintenance therapy as an outpatient.  He underwent EGD for further evaluation of his abdominal pain symptoms with Dr. Jean and was found to have a pre-pyloric ulcer.  He will start Protonix 40 mg twice daily and will continue this for 90 days.  He will follow up with Dr. Jean for ongoing evaluation as an outpatient.  They are working on insurance approval for Remicade as an outpatient.  He will continue on prednisone taper as prescribed.  His symptoms are improving at this time.  Additionally, he was found to have severe iron-deficiency anemia.  He was given IV iron and will continue iron supplementation as an outpatient.  I have reviewed the discharge plan of care instructions with the patient on the day of discharge.  He was discharged in good condition with plans for close outpatient follow-up with Gastroenterology and primary care physician    Review of patient's allergies indicates:  No Known Allergies    Past Medical History:   Diagnosis Date    Diabetes mellitus 01/2022    Hyperlipidemia 2015    Hypertension 2015    Insomnia 2015     Past Surgical History:   Procedure Laterality Date    CARDIAC ELECTROPHYSIOLOGY MAPPING AND ABLATION  2017    SVT    CHOLECYSTECTOMY  2011    COLONOSCOPY N/A 5/3/2022    Procedure: COLONOSCOPY;  Surgeon: Shan Jean III, MD;  Location: [facility];  Service: Endoscopy;  Laterality: N/A;    COLONOSCOPY WITH FECAL MICROBIOTA TRANSFER N/A 3/21/2023    Procedure: COLONOSCOPY, WITH FECAL MICROBIOTA TRANSFER;  Surgeon: David Millan MD;  Location: [facility];  Service: Endoscopy;  Laterality: N/A;    ESOPHAGOGASTRODUODENOSCOPY N/A 4/24/2023    Procedure: EGD (ESOPHAGOGASTRODUODENOSCOPY);  Surgeon: Shan Jean III, MD;  Location: [facility];  Service: Endoscopy;  Laterality: N/A;    GANGLION CYST EXCISION Left 1992    UMBILICAL HERNIA REPAIR  2011    with mesh     Social History     Tobacco Use    Smoking status: Every Day     Packs/day: 1.00      Years: 30.00     Pack years: 30.00     Types: Cigarettes    Smokeless tobacco: Never    Tobacco comments:     DO NOT SMOKE DOS   Substance Use Topics    Alcohol use: Yes     Comment: ocass    Drug use: Not Currently        Review of Systems   As noted in HPI in addition     Constitutional: Negative for chills, fatigue and fever.   Eyes: No double vision, No blurred vision  Neuro: No headaches, No dizziness  Respiratory: Negative for cough, positive for shortness of breath without any wheezing.    Cardiovascular: Negative for chest pain. Negative for palpitations and leg swelling.   Gastrointestinal:  Denies abdominal pain, No melena, diarrhea has improved.  Nausea dyspepsia has improved.   Genitourinary: Negative for dysuria and frequency, Negative for hematuria  Skin: Negative for bruising, Negative for edema or discoloration noted.   Endocrine: Negative for polyphagia, Negative for heat intolerance, Negative for cold intolerance  Psychiatric: Negative for depression, Negative for anxiety, Negative for memory loss  Musculoskeletal: Negative for neck pain, Negative for muscle weakness, Negative for back pain          Objective        Vitals:    05/30/23 1606   BP: 118/74   Pulse: 93   Resp: 16       LIPIDS - LAST 2   Lab Results   Component Value Date    CHOL 103 (L) 01/04/2023    CHOL 111 (L) 06/07/2022    HDL 41 01/04/2023    HDL 28 (L) 06/07/2022    LDLCALC 44.8 (L) 01/04/2023    LDLCALC 58.4 (L) 06/07/2022    TRIG 86 01/04/2023    TRIG 123 06/07/2022    CHOLHDL 39.8 01/04/2023    CHOLHDL 25.2 06/07/2022       CBC - LAST 2  Lab Results   Component Value Date    WBC 13.60 (H) 05/24/2023    WBC 10.38 04/25/2023    RBC 3.75 (L) 05/24/2023    RBC 2.90 (L) 04/25/2023    HGB 11.9 (L) 05/24/2023    HGB 8.4 (L) 04/25/2023    HCT 39.1 (L) 05/24/2023    HCT 26.0 (L) 04/25/2023     (H) 05/24/2023    MCV 90 04/25/2023    MCH 31.7 (H) 05/24/2023    MCH 29.0 04/25/2023    MCHC 30.4 (L) 05/24/2023    [facility] 32.3 04/25/2023     RDW SEE COMMENT 05/24/2023    RDW 15.9 (H) 04/25/2023     05/24/2023     04/25/2023    MPV 9.4 05/24/2023    MPV 9.3 04/25/2023    GRAN 12.0 (H) 05/24/2023    GRAN 88.6 (H) 05/24/2023    LYMPH 1.1 05/24/2023    LYMPH 7.9 (L) 05/24/2023    MONO 0.3 05/24/2023    MONO 2.4 (L) 05/24/2023    BASO 0.04 05/24/2023    BASO 0.00 04/21/2023    NRBC 0 05/24/2023    NRBC 0 04/21/2023       CHEMISTRY & LIVER FUNCTION - LAST 2  Lab Results   Component Value Date     05/24/2023     (L) 04/25/2023    K 5.1 05/24/2023    K 4.4 04/25/2023     05/24/2023     04/25/2023    CO2 27 05/24/2023    CO2 25 04/25/2023    ANIONGAP 7 (L) 05/24/2023    ANIONGAP 9 04/25/2023    BUN 25 (H) 05/24/2023    BUN 15 04/25/2023    CREATININE 0.7 05/24/2023    CREATININE 0.6 04/25/2023     05/24/2023     (H) 04/25/2023    CALCIUM 9.2 05/24/2023    CALCIUM 8.1 (L) 04/25/2023    MG 1.4 (L) 04/21/2023    MG 1.4 (L) 01/12/2023    ALBUMIN 3.7 05/24/2023    ALBUMIN 2.2 (L) 04/25/2023    PROT 6.8 05/24/2023    PROT 5.6 (L) 04/25/2023    ALKPHOS 52 (L) 05/24/2023    ALKPHOS 65 04/25/2023    ALT 26 05/24/2023    ALT 59 (H) 04/25/2023    AST 15 05/24/2023    AST 45 (H) 04/25/2023    BILITOT 0.5 05/24/2023    BILITOT 0.4 04/25/2023        CARDIAC PROFILE - LAST 2  Lab Results   Component Value Date     (H) 01/30/2023        COAGULATION - LAST 2  No results found for: LABPT, INR, APTT    ENDOCRINE & PSA - LAST 2  Lab Results   Component Value Date    HGBA1C 4.8 04/21/2023    HGBA1C 5.8 01/04/2023    MICROALBUR 1.2 01/26/2022    TSH 0.550 05/24/2023    TSH 1.510 01/11/2023    PSA 0.24 03/10/2021        ECHOCARDIOGRAM RESULTS  No results found for this or any previous visit.      CURRENT/PREVIOUS VISIT EKG  Results for orders placed or performed in visit on 05/23/23   IN OFFICE EKG 12-LEAD (to Angelica)    Collection Time: 05/23/23 10:04 AM    Narrative    Test Reason : R00.2,    Vent. Rate : 087 BPM     Atrial  Rate : 087 BPM     P-R Int : 144 ms          QRS Dur : 068 ms      QT Int : 352 ms       P-R-T Axes : 010 038 024 degrees     QTc Int : 423 ms    Normal sinus rhythm  Normal ECG  When compared with ECG of 15-JUL-2022 16:43,  No significant change was found    Referred By:  MICA PALMA           Confirmed By:      No valid procedures specified.   No results found for this or any previous visit.    05/30/2023 EKG shows normal sinus rhythm, voltage criteria for LVH otherwise within normal limits.          PREVIOUS STRESS TEST              PREVIOUS ANGIOGRAM        PHYSICAL EXAM    GENERAL: well built, well nourished, well-developed in no apparent distress alert and oriented.   HEENT: Normocephalic. Pupils normal and conjunctivae normal.  Mucous membranes normal, no cyanosis or icterus, trachea central,no pallor or icterus is noted..   NECK: No JVD. No bruit..   THYROID: Thyroid not enlarged. No nodules present..   CARDIAC: Regular rate and rhythm. S1 is normal.S2 is normal.No gallops, clicks or murmurs noted at this time.  CHEST ANATOMY: normal.   LUNGS: Clear to auscultation. No wheezing or rhonchi..   ABDOMEN: Soft no masses or organomegaly.  No abdomen pulsations or bruits.  Normal bowel sounds. No pulsations and no masses felt, No guarding or rebound.   EXTREMITIES: No cyanosis, clubbing or edema noted at this time., no calf tenderness bilaterally.   PERIPHERAL VASCULAR SYSTEM: Good palpable distal pulses.   CENTRAL NERVOUS SYSTEM: No focal motor or sensory deficits noted.   SKIN: Skin without lesions, moist, well perfused.   MUSCLE STRENGTH & TONE: No noteable weakness, atrophy or abnormal movement.     I HAVE REVIEWED :    The vital signs, nurses notes, and all the pertinent radiology and labs.        Current Outpatient Medications   Medication Instructions    budesonide (ENTOCORT EC) 9 mg, Oral, Daily    famotidine (PEPCID) 40 mg, Oral, Nightly    ferrous sulfate (IRON) 325 mg, Oral, Daily    metFORMIN  (GLUCOPHAGE-XR) 500 MG ER 24hr tablet TAKE 1 TABLET(500 MG) BY MOUTH TWICE DAILY WITH MEALS    metoprolol succinate (TOPROL-XL) 50 MG 24 hr tablet TAKE 1 TABLET(50 MG) BY MOUTH EVERY DAY    OZEMPIC 1 mg, Subcutaneous, Every 7 days    pantoprazole (PROTONIX) 40 mg, Oral, 2 times daily    potassium chloride SA (K-DUR,KLOR-CON) 20 MEQ tablet 20 mEq, Oral, Daily    promethazine (PHENERGAN) 25-50 mg, Oral, Every 8 hours PRN    sertraline (ZOLOFT) 50 mg, Oral, Daily    simvastatin (ZOCOR) 20 MG tablet TAKE 1 TABLET(20 MG) BY MOUTH EVERY NIGHT    zolpidem (AMBIEN) 10 mg, Oral, Nightly PRN      Accession #: [id number]  Jacoby Jean-Baptiste  Holter monitor - 24 hour  Order# [id number]  Reading physician: Surinder Roman MD Ordering physician: Hunter Aguilar III, MD Study date: 7/25/22     Reason for Exam  Priority: Routine  Dx: Palpitations [R00.2 (ICD-10-CM)]     Conclusion    The diary was properly completed. Overall, the study was of good quality. The tape was adequate (0 days , 24 hours, 0 minutes). Patient was asymptomatic during study.  Predominant Rhythm Sinus rhythm with heart rates varying between 59 and 112 BPM with an average of 81BPM. Maximum heart rate recorded at: 20:03 CDT . Minimum heart rate recorded at 03:43 CDT.  Ventricular Arrhythmias There were rare PVCs totalling 163 and averaging 6.79 per hour.  Supraventricular Arrhythmias There were very rare PACs totalling 6 and averaging 0.25 per hour.       Assessment & Plan     History of supraventricular tachycardia  Had prior history of supraventricular tachycardia although he has been doing well with no recurrence of any sustained palpitations.    His smart watch has picked up some extra ectopic beats will obtain further evaluation regarding the same.    Recommend to obtain laboratory profile including magnesium levels and TSH   Also obtain a Zio monitoring for 3-7 day event recording.    Obtain echocardiogram for LV function assessment.    Hyperlipidemia  He is  presently on simvastatin and 20 mg daily maintain low-fat low-cholesterol diet and continue the same.    Palpitations  As above needs further investigation will obtain a Zio monitoring.  Event recording  Is presently on metoprolol succinate 50 mg tablets once daily continue the same if tolerated may consider adding magnesium oxide.    Type 2 diabetes mellitus without complication, without long-term current use of insulin  Presently on Ozempic and metformin continue the same.    Morbid obesity  His BMI is 40.44 kilograms/meter sq encouraged him to continue on same therapy continue on cautious dietary intake continue on Ozempic weekly.    Tobacco use  Is still using about a pack of cigarettes a day.  Continue tobacco cessation program  His previous Holter has shown isolated PVCs and PACs.        No follow-ups on file.

## 2023-05-30 NOTE — ASSESSMENT & PLAN NOTE
His BMI is 40.44 kilograms/meter sq encouraged him to continue on same therapy continue on cautious dietary intake continue on Ozempic weekly.

## 2023-05-30 NOTE — ASSESSMENT & PLAN NOTE
Had prior history of supraventricular tachycardia although he has been doing well with no recurrence of any sustained palpitations.    His smart watch has picked up some extra ectopic beats will obtain further evaluation regarding the same.    Recommend to obtain laboratory profile including magnesium levels and TSH   Also obtain a Zio monitoring for 3-7 day event recording.    Obtain echocardiogram for LV function assessment.

## 2023-06-05 ENCOUNTER — PATIENT MESSAGE (OUTPATIENT)
Dept: FAMILY MEDICINE | Facility: CLINIC | Age: 48
End: 2023-06-05
Payer: COMMERCIAL

## 2023-06-05 DIAGNOSIS — F41.9 ANXIETY: Primary | ICD-10-CM

## 2023-06-05 RX ORDER — SERTRALINE HYDROCHLORIDE 50 MG/1
50 TABLET, FILM COATED ORAL DAILY
Qty: 90 TABLET | Refills: 1 | Status: SHIPPED | OUTPATIENT
Start: 2023-06-05 | End: 2023-10-30 | Stop reason: SDUPTHER

## 2023-06-09 RX ORDER — SEMAGLUTIDE 1.34 MG/ML
1 INJECTION, SOLUTION SUBCUTANEOUS
Qty: 3 EACH | Refills: 1 | Status: SHIPPED | OUTPATIENT
Start: 2023-06-09 | End: 2023-10-30 | Stop reason: SDUPTHER

## 2023-06-09 NOTE — TELEPHONE ENCOUNTER
No care due was identified.  [facility] Embedded Care Due Messages. Reference number: [id number].   6/09/2023 10:57:46 AM CDT

## 2023-06-09 NOTE — TELEPHONE ENCOUNTER
Refill Authorization Note     Refill Decision Note   Jacoby Jean-Baptiste  is requesting a refill authorization.  Brief Assessment and Rationale for Refill:        Medication Therapy Plan:       Medication Reconciliation Completed: No   Comments:     No Care Gaps recommended.     Note composed:5:46 PM 06/09/2023

## 2023-06-21 ENCOUNTER — TELEPHONE (OUTPATIENT)
Dept: CARDIOLOGY | Facility: HOSPITAL | Age: 48
End: 2023-06-21

## 2023-06-21 NOTE — TELEPHONE ENCOUNTER
Patient advised, test will be at [facility] ([address] [facility]).   Will need to register on the first floor at the main entrance.   Patient advised that arrival time is 7:10am.  Patient advised that he may be here about 3.5-4 hours, and may want to bring something to occupy their time, as there will be periods of waiting.    Patient advised, may take his medications prior to testing if you need to.   Advised if he needs to eat to take his medications, please keep it light, like toast and juice.    Patient advised to avoid all caffeine 12 hours prior to testing.  This includes decaf tea and coffee.    Will provide peanut butter crackers for a snack after stress test.  If patient would prefer something else, please bring a snack from home.    Wear comfortable clothing.   No lotions, oils, or powders to the upper chest area. May wear deodorant.    No metal jewelry, buttons, or zippers to the upper body.  Patient verbalizes understanding of instructions.

## 2023-06-22 ENCOUNTER — HOSPITAL ENCOUNTER (OUTPATIENT)
Dept: CARDIOLOGY | Facility: HOSPITAL | Age: 48
Discharge: HOME | End: 2023-06-22
Attending: INTERNAL MEDICINE
Payer: COMMERCIAL

## 2023-06-22 ENCOUNTER — HOSPITAL ENCOUNTER (OUTPATIENT)
Dept: RADIOLOGY | Facility: HOSPITAL | Age: 48
Discharge: HOME | End: 2023-06-22
Attending: INTERNAL MEDICINE
Payer: COMMERCIAL

## 2023-06-22 DIAGNOSIS — R00.2 PALPITATIONS: ICD-10-CM

## 2023-06-22 DIAGNOSIS — E11.9 TYPE 2 DIABETES MELLITUS WITHOUT COMPLICATION, WITHOUT LONG-TERM CURRENT USE OF INSULIN: ICD-10-CM

## 2023-06-22 DIAGNOSIS — E78.00 PURE HYPERCHOLESTEROLEMIA: ICD-10-CM

## 2023-06-22 DIAGNOSIS — Z86.79 HISTORY OF SUPRAVENTRICULAR TACHYCARDIA: ICD-10-CM

## 2023-06-22 DIAGNOSIS — Z72.0 TOBACCO USE: ICD-10-CM

## 2023-06-22 LAB
CV PHARM DOSE: 0.4 MG
CV STRESS BASE HR: 76 BPM
DIASTOLIC BLOOD PRESSURE: 76 MMHG
EJECTION FRACTION- HIGH: 65 %
END DIASTOLIC INDEX-HIGH: 153 ML/M2
END DIASTOLIC INDEX-LOW: 93 ML/M2
END SYSTOLIC INDEX-HIGH: 71 ML/M2
END SYSTOLIC INDEX-LOW: 31 ML/M2
NUC REST DIASTOLIC VOLUME INDEX: 116
NUC REST EJECTION FRACTION: 71
NUC REST SYSTOLIC VOLUME INDEX: 34
NUC STRESS DIASTOLIC VOLUME INDEX: 124
NUC STRESS EJECTION FRACTION: 75 %
NUC STRESS SYSTOLIC VOLUME INDEX: 31
OHS CV CPX 1 MINUTE RECOVERY HEART RATE: 94 BPM
OHS CV CPX 85 PERCENT MAX PREDICTED HEART RATE MALE: 145
OHS CV CPX MAX PREDICTED HEART RATE: 171
OHS CV CPX PATIENT IS FEMALE: 0
OHS CV CPX PATIENT IS MALE: 1
OHS CV CPX PEAK DIASTOLIC BLOOD PRESSURE: 76 MMHG
OHS CV CPX PEAK HEAR RATE: 94 BPM
OHS CV CPX PEAK RATE PRESSURE PRODUCT: NORMAL
OHS CV CPX PEAK SYSTOLIC BLOOD PRESSURE: 128 MMHG
OHS CV CPX PERCENT MAX PREDICTED HEART RATE ACHIEVED: 55
OHS CV CPX RATE PRESSURE PRODUCT PRESENTING: 9424
RETIRED EF AND QEF - SEE NOTES: 53 %
SYSTOLIC BLOOD PRESSURE: 124 MMHG

## 2023-06-22 PROCEDURE — 93018 CV STRESS TEST I&R ONLY: CPT | Mod: ,,, | Performed by: INTERNAL MEDICINE

## 2023-06-22 PROCEDURE — 93016 NUCLEAR STRESS - CARDIOLOGY INTERPRETED (CUPID ONLY): ICD-10-PCS | Mod: ,,, | Performed by: NURSE PRACTITIONER

## 2023-06-22 PROCEDURE — 78452 NUCLEAR STRESS - CARDIOLOGY INTERPRETED (CUPID ONLY): ICD-10-PCS | Mod: 26,,, | Performed by: INTERNAL MEDICINE

## 2023-06-22 PROCEDURE — A9502 TC99M TETROFOSMIN: HCPCS

## 2023-06-22 PROCEDURE — 78452 HT MUSCLE IMAGE SPECT MULT: CPT | Mod: 26,,, | Performed by: INTERNAL MEDICINE

## 2023-06-22 PROCEDURE — 93018 NUCLEAR STRESS - CARDIOLOGY INTERPRETED (CUPID ONLY): ICD-10-PCS | Mod: ,,, | Performed by: INTERNAL MEDICINE

## 2023-06-22 PROCEDURE — 78452 HT MUSCLE IMAGE SPECT MULT: CPT

## 2023-06-22 PROCEDURE — 93016 CV STRESS TEST SUPVJ ONLY: CPT | Mod: ,,, | Performed by: NURSE PRACTITIONER

## 2023-06-22 RX ORDER — REGADENOSON 0.08 MG/ML
0.4 INJECTION, SOLUTION INTRAVENOUS ONCE
Status: COMPLETED | OUTPATIENT
Start: 2023-06-22 | End: 2023-06-22

## 2023-06-22 RX ORDER — REGADENOSON 0.08 MG/ML
0.4 INJECTION, SOLUTION INTRAVENOUS ONCE
Status: DISCONTINUED | OUTPATIENT
Start: 2023-06-22 | End: 2023-06-22

## 2023-06-29 RX ORDER — METFORMIN HYDROCHLORIDE 500 MG/1
500 TABLET, EXTENDED RELEASE ORAL 2 TIMES DAILY WITH MEALS
Qty: 180 TABLET | Refills: 1 | Status: SHIPPED | OUTPATIENT
Start: 2023-06-29 | End: 2023-10-30 | Stop reason: SDUPTHER

## 2023-06-29 RX ORDER — SIMVASTATIN 20 MG/1
20 TABLET, FILM COATED ORAL NIGHTLY
Qty: 90 TABLET | Refills: 1 | Status: SHIPPED | OUTPATIENT
Start: 2023-06-29 | End: 2023-10-30 | Stop reason: SDUPTHER

## 2023-06-29 RX ORDER — METOPROLOL SUCCINATE 50 MG/1
50 TABLET, EXTENDED RELEASE ORAL DAILY
Qty: 90 TABLET | Refills: 1 | Status: SHIPPED | OUTPATIENT
Start: 2023-06-29 | End: 2023-10-30 | Stop reason: SDUPTHER

## 2023-06-29 NOTE — TELEPHONE ENCOUNTER
No care due was identified.  [facility] Embedded Care Due Messages. Reference number: [id number].   6/29/2023 9:10:48 AM CDT

## 2023-07-14 ENCOUNTER — OFFICE VISIT (OUTPATIENT)
Dept: FAMILY MEDICINE | Facility: CLINIC | Age: 48
End: 2023-07-14
Payer: COMMERCIAL

## 2023-07-14 VITALS
HEIGHT: 65 IN | WEIGHT: 279 LBS | TEMPERATURE: 98 F | DIASTOLIC BLOOD PRESSURE: 86 MMHG | BODY MASS INDEX: 46.48 KG/M2 | OXYGEN SATURATION: 98 % | SYSTOLIC BLOOD PRESSURE: 118 MMHG | HEART RATE: 82 BPM

## 2023-07-14 DIAGNOSIS — M70.21 OLECRANON BURSITIS OF RIGHT ELBOW: Primary | ICD-10-CM

## 2023-07-14 PROCEDURE — 3079F DIAST BP 80-89 MM HG: CPT | Mod: CPTII,S$GLB,,

## 2023-07-14 PROCEDURE — 3072F PR LOW RISK FOR RETINOPATHY: ICD-10-PCS | Mod: CPTII,S$GLB,,

## 2023-07-14 PROCEDURE — 3074F SYST BP LT 130 MM HG: CPT | Mod: CPTII,S$GLB,,

## 2023-07-14 PROCEDURE — 3044F HG A1C LEVEL LT 7.0%: CPT | Mod: CPTII,S$GLB,,

## 2023-07-14 PROCEDURE — 99214 PR OFFICE/OUTPT VISIT, EST, LEVL IV, 30-39 MIN: ICD-10-PCS | Mod: S$GLB,,,

## 2023-07-14 PROCEDURE — 3008F PR BODY MASS INDEX (BMI) DOCUMENTED: ICD-10-PCS | Mod: CPTII,S$GLB,,

## 2023-07-14 PROCEDURE — 3044F PR MOST RECENT HEMOGLOBIN A1C LEVEL <7.0%: ICD-10-PCS | Mod: CPTII,S$GLB,,

## 2023-07-14 PROCEDURE — 3072F LOW RISK FOR RETINOPATHY: CPT | Mod: CPTII,S$GLB,,

## 2023-07-14 PROCEDURE — 3079F PR MOST RECENT DIASTOLIC BLOOD PRESSURE 80-89 MM HG: ICD-10-PCS | Mod: CPTII,S$GLB,,

## 2023-07-14 PROCEDURE — 3074F PR MOST RECENT SYSTOLIC BLOOD PRESSURE < 130 MM HG: ICD-10-PCS | Mod: CPTII,S$GLB,,

## 2023-07-14 PROCEDURE — 3008F BODY MASS INDEX DOCD: CPT | Mod: CPTII,S$GLB,,

## 2023-07-14 PROCEDURE — 99214 OFFICE O/P EST MOD 30 MIN: CPT | Mod: S$GLB,,,

## 2023-07-14 RX ORDER — DOXYCYCLINE 100 MG/1
100 CAPSULE ORAL 2 TIMES DAILY
Qty: 20 CAPSULE | Refills: 0 | Status: SHIPPED | OUTPATIENT
Start: 2023-07-14 | End: 2023-07-24

## 2023-07-14 NOTE — PROGRESS NOTES
Subjective:       Patient ID: Jacoby Jean-Baptiste is a 48 y.o. male.    Chief Complaint: Arm Pain    Estiven Villagomez is a 48-year-old male who presents to clinic today with complaints Pain in right elbow for about a month. Has also has some swelling. Swelling appeared to be a hard knot but has improved some since yesterday. Pain is still present and is rated 6/10.  No weakness, numbness or tingling. Has not been taking any medications.   Review of patient's allergies indicates:  No Known Allergies  Social Determinants of Health     Tobacco Use: High Risk    Smoking Tobacco Use: Every Day    Smokeless Tobacco Use: Never    Passive Exposure: Not on file   Alcohol Use: Not At Risk    Frequency of Alcohol Consumption: Never    Average Number of Drinks: Patient does not drink    Frequency of Binge Drinking: Never   Financial Resource Strain: Low Risk     Difficulty of Paying Living Expenses: Not very hard   Food Insecurity: No Food Insecurity    Worried About Running Out of Food in the Last Year: Never true    Ran Out of Food in the Last Year: Never true   Transportation Needs: No Transportation Needs    Lack of Transportation (Medical): No    Lack of Transportation (Non-Medical): No   Physical Activity: Inactive    Days of Exercise per Week: 0 days    Minutes of Exercise per Session: 0 min   Stress: No Stress Concern Present    Feeling of Stress : Only a little   Social Connections: Socially Isolated    Frequency of Communication with Friends and Family: Twice a week    Frequency of Social Gatherings with Friends and Family: Twice a week    Attends Rastafarian Services: Never    Active Member of Clubs or Organizations: No    Attends Club or Organization Meetings: Never    Marital Status: Never    Housing Stability: Low Risk     Unable to Pay for Housing in the Last Year: No    Number of Places Lived in the Last Year: 1    Unstable Housing in the Last Year: No   Depression: Low Risk     Last PHQ-4: Flowsheet Data: 0       Past Medical History:   Diagnosis Date    Diabetes mellitus 01/2022    Hyperlipidemia 2015    Hypertension 2015    Insomnia 2015      Past Surgical History:   Procedure Laterality Date    CARDIAC ELECTROPHYSIOLOGY MAPPING AND ABLATION  2017    SVT    CHOLECYSTECTOMY  2011    COLONOSCOPY N/A 5/3/2022    Procedure: COLONOSCOPY;  Surgeon: Shan Jean III, MD;  Location: [facility] ENDO;  Service: Endoscopy;  Laterality: N/A;    COLONOSCOPY WITH FECAL MICROBIOTA TRANSFER N/A 3/21/2023    Procedure: COLONOSCOPY, WITH FECAL MICROBIOTA TRANSFER;  Surgeon: David Millan MD;  Location: [facility] ENDO;  Service: Endoscopy;  Laterality: N/A;    ESOPHAGOGASTRODUODENOSCOPY N/A 4/24/2023    Procedure: EGD (ESOPHAGOGASTRODUODENOSCOPY);  Surgeon: Shan Jean III, MD;  Location: [facility];  Service: Endoscopy;  Laterality: N/A;    GANGLION CYST EXCISION Left 1992    UMBILICAL HERNIA REPAIR  2011    with mesh      Social History     Socioeconomic History    Marital status: Single         Current Outpatient Medications:     budesonide (ENTOCORT EC) 3 mg capsule, Take 9 mg by mouth once daily., Disp: , Rfl:     doxycycline (VIBRAMYCIN) 100 MG Cap, Take 1 capsule (100 mg total) by mouth 2 (two) times daily. for 10 days, Disp: 20 capsule, Rfl: 0    famotidine (PEPCID) 40 MG tablet, Take 40 mg by mouth every evening., Disp: , Rfl:     ferrous sulfate (IRON) 325 mg (65 mg iron) Tab tablet, Take 1 tablet (325 mg total) by mouth once daily., Disp: 90 tablet, Rfl: 0    metFORMIN (GLUCOPHAGE-XR) 500 MG ER 24hr tablet, Take 1 tablet (500 mg total) by mouth 2 (two) times daily with meals., Disp: 180 tablet, Rfl: 1    metoprolol succinate (TOPROL-XL) 50 MG 24 hr tablet, Take 1 tablet (50 mg total) by mouth once daily., Disp: 90 tablet, Rfl: 1    pantoprazole (PROTONIX) 40 MG tablet, Take 1 tablet (40 mg total) by mouth 2 (two) times daily., Disp: 180 tablet, Rfl: 3    potassium chloride SA (K-DUR,KLOR-CON) 20 MEQ tablet, Take 1 tablet  (20 mEq total) by mouth once daily., Disp: 90 tablet, Rfl: 0    promethazine (PHENERGAN) 25 MG tablet, Take 25-50 mg by mouth every 8 (eight) hours as needed., Disp: , Rfl:     semaglutide (OZEMPIC) 1 mg/dose (4 mg/3 mL), Inject 1 mg into the skin every 7 days., Disp: 3 each, Rfl: 1    sertraline (ZOLOFT) 50 MG tablet, Take 1 tablet (50 mg total) by mouth once daily., Disp: 90 tablet, Rfl: 1    simvastatin (ZOCOR) 20 MG tablet, Take 1 tablet (20 mg total) by mouth every evening., Disp: 90 tablet, Rfl: 1    zolpidem (AMBIEN) 10 mg Tab, Take 1 tablet (10 mg total) by mouth nightly as needed (insomnia)., Disp: 30 tablet, Rfl: 2  No current facility-administered medications for this visit.    Facility-Administered Medications Ordered in Other Visits:     lactated ringers infusion, , Intravenous, Continuous, David Millan MD, Last Rate: 75 mL/hr at 03/21/23 1252, New Bag at 03/21/23 1252    Lab Results   Component Value Date    WBC 13.60 (H) 05/24/2023    HGB 11.9 (L) 05/24/2023    HCT 39.1 (L) 05/24/2023     05/24/2023    CHOL 103 (L) 01/04/2023    TRIG 86 01/04/2023    HDL 41 01/04/2023    ALT 26 05/24/2023    AST 15 05/24/2023     05/24/2023    K 5.1 05/24/2023     05/24/2023    CREATININE 0.7 05/24/2023    BUN 25 (H) 05/24/2023    CO2 27 05/24/2023    TSH 0.550 05/24/2023    PSA 0.24 03/10/2021    HGBA1C 4.8 04/21/2023    MICROALBUR 1.2 01/26/2022       Review of Systems   Constitutional:  Negative for chills and fever.   Respiratory:  Negative for chest tightness and shortness of breath.    Cardiovascular:  Negative for chest pain and palpitations.   Musculoskeletal:  Positive for arthralgias and joint swelling.     Objective:      Physical Exam  Vitals reviewed.   Constitutional:       Appearance: Normal appearance.   Cardiovascular:      Rate and Rhythm: Normal rate and regular rhythm.      Pulses: Normal pulses.      Heart sounds: Normal heart sounds.   Pulmonary:      Effort: Pulmonary  effort is normal.      Breath sounds: Normal breath sounds.   Musculoskeletal:      Right elbow: Swelling present. Tenderness present in olecranon process.   Skin:     General: Skin is warm and dry.      Capillary Refill: Capillary refill takes less than 2 seconds.   Neurological:      Mental Status: He is alert.       Assessment:       1. Olecranon bursitis of right elbow        Plan:       Jacoby was seen today for arm pain.    Diagnoses and all orders for this visit:    Olecranon bursitis of right elbow  -     doxycycline (VIBRAMYCIN) 100 MG Cap; Take 1 capsule (100 mg total) by mouth 2 (two) times daily. for 10 days    Take doxycycline as prescribed for bursitis of his right elbow.  Patient instructed to follow-up with clinic if symptoms do not improve or worsen.

## 2023-07-19 ENCOUNTER — TELEPHONE (OUTPATIENT)
Dept: PHARMACY | Facility: CLINIC | Age: 48
End: 2023-07-19
Payer: COMMERCIAL

## 2023-07-24 PROBLEM — K92.2 GI BLEED: Status: RESOLVED | Noted: 2023-04-20 | Resolved: 2023-07-24

## 2023-07-25 ENCOUNTER — LAB VISIT (OUTPATIENT)
Dept: LAB | Facility: HOSPITAL | Age: 48
End: 2023-07-25
Attending: INTERNAL MEDICINE
Payer: COMMERCIAL

## 2023-07-25 DIAGNOSIS — R19.7 DIARRHEA OF PRESUMED INFECTIOUS ORIGIN: Primary | ICD-10-CM

## 2023-07-25 LAB — CRP SERPL-MCNC: 0.46 MG/DL

## 2023-07-25 PROCEDURE — 36415 COLL VENOUS BLD VENIPUNCTURE: CPT | Performed by: INTERNAL MEDICINE

## 2023-07-25 PROCEDURE — 86140 C-REACTIVE PROTEIN: CPT | Performed by: INTERNAL MEDICINE

## 2023-07-27 ENCOUNTER — LAB VISIT (OUTPATIENT)
Dept: LAB | Facility: HOSPITAL | Age: 48
End: 2023-07-27
Attending: INTERNAL MEDICINE
Payer: COMMERCIAL

## 2023-07-27 DIAGNOSIS — R19.7 DIARRHEA OF PRESUMED INFECTIOUS ORIGIN: Primary | ICD-10-CM

## 2023-07-27 PROCEDURE — 83993 ASSAY FOR CALPROTECTIN FECAL: CPT | Performed by: INTERNAL MEDICINE

## 2023-07-27 PROCEDURE — 87507 IADNA-DNA/RNA PROBE TQ 12-25: CPT | Performed by: INTERNAL MEDICINE

## 2023-07-31 LAB — CALPROTECTIN STL-MCNT: 201 UG/G (ref 0–120)

## 2023-08-04 DIAGNOSIS — G47.00 INSOMNIA, UNSPECIFIED TYPE: ICD-10-CM

## 2023-08-07 DIAGNOSIS — G47.00 INSOMNIA, UNSPECIFIED TYPE: ICD-10-CM

## 2023-08-09 ENCOUNTER — PATIENT MESSAGE (OUTPATIENT)
Dept: FAMILY MEDICINE | Facility: CLINIC | Age: 48
End: 2023-08-09
Payer: COMMERCIAL

## 2023-08-09 DIAGNOSIS — G47.00 INSOMNIA, UNSPECIFIED TYPE: ICD-10-CM

## 2023-08-09 RX ORDER — ZOLPIDEM TARTRATE 10 MG/1
10 TABLET ORAL NIGHTLY PRN
Qty: 30 TABLET | Refills: 2 | Status: SHIPPED | OUTPATIENT
Start: 2023-08-09 | End: 2023-10-30 | Stop reason: SDUPTHER

## 2023-08-09 RX ORDER — ZOLPIDEM TARTRATE 10 MG/1
10 TABLET ORAL NIGHTLY PRN
Qty: 30 TABLET | Refills: 2 | OUTPATIENT
Start: 2023-08-09 | End: 2024-02-07

## 2023-08-09 RX ORDER — ZOLPIDEM TARTRATE 10 MG/1
10 TABLET ORAL NIGHTLY PRN
Qty: 30 TABLET | OUTPATIENT
Start: 2023-08-09

## 2023-09-05 ENCOUNTER — OFFICE VISIT (OUTPATIENT)
Dept: OPHTHALMOLOGY | Facility: CLINIC | Age: 48
End: 2023-09-05
Payer: COMMERCIAL

## 2023-09-05 DIAGNOSIS — E11.9 TYPE 2 DIABETES MELLITUS WITHOUT RETINOPATHY: Primary | ICD-10-CM

## 2023-09-05 DIAGNOSIS — D31.32 CHOROIDAL NEVUS, LEFT EYE: ICD-10-CM

## 2023-09-05 PROCEDURE — 2023F DILAT RTA XM W/O RTNOPTHY: CPT | Mod: CPTII,S$GLB,, | Performed by: OPHTHALMOLOGY

## 2023-09-05 PROCEDURE — 92014 COMPRE OPH EXAM EST PT 1/>: CPT | Mod: S$GLB,,, | Performed by: OPHTHALMOLOGY

## 2023-09-05 PROCEDURE — 1159F MED LIST DOCD IN RCRD: CPT | Mod: CPTII,S$GLB,, | Performed by: OPHTHALMOLOGY

## 2023-09-05 PROCEDURE — 99999 PR PBB SHADOW E&M-EST. PATIENT-LVL III: CPT | Mod: PBBFAC,,, | Performed by: OPHTHALMOLOGY

## 2023-09-05 PROCEDURE — 1159F PR MEDICATION LIST DOCUMENTED IN MEDICAL RECORD: ICD-10-PCS | Mod: CPTII,S$GLB,, | Performed by: OPHTHALMOLOGY

## 2023-09-05 PROCEDURE — 3044F HG A1C LEVEL LT 7.0%: CPT | Mod: CPTII,S$GLB,, | Performed by: OPHTHALMOLOGY

## 2023-09-05 PROCEDURE — 2023F PR DILATED RETINAL EXAM W/O EVID OF RETINOPATHY: ICD-10-PCS | Mod: CPTII,S$GLB,, | Performed by: OPHTHALMOLOGY

## 2023-09-05 PROCEDURE — 99999 PR PBB SHADOW E&M-EST. PATIENT-LVL III: ICD-10-PCS | Mod: PBBFAC,,, | Performed by: OPHTHALMOLOGY

## 2023-09-05 PROCEDURE — 92134 POSTERIOR SEGMENT OCT RETINA (OCULAR COHERENCE TOMOGRAPHY)-BOTH EYES: ICD-10-PCS | Mod: S$GLB,,, | Performed by: OPHTHALMOLOGY

## 2023-09-05 PROCEDURE — 1160F RVW MEDS BY RX/DR IN RCRD: CPT | Mod: CPTII,S$GLB,, | Performed by: OPHTHALMOLOGY

## 2023-09-05 PROCEDURE — 92134 CPTRZ OPH DX IMG PST SGM RTA: CPT | Mod: S$GLB,,, | Performed by: OPHTHALMOLOGY

## 2023-09-05 PROCEDURE — 92014 PR EYE EXAM, EST PATIENT,COMPREHESV: ICD-10-PCS | Mod: S$GLB,,, | Performed by: OPHTHALMOLOGY

## 2023-09-05 PROCEDURE — 3044F PR MOST RECENT HEMOGLOBIN A1C LEVEL <7.0%: ICD-10-PCS | Mod: CPTII,S$GLB,, | Performed by: OPHTHALMOLOGY

## 2023-09-05 PROCEDURE — 1160F PR REVIEW ALL MEDS BY PRESCRIBER/CLIN PHARMACIST DOCUMENTED: ICD-10-PCS | Mod: CPTII,S$GLB,, | Performed by: OPHTHALMOLOGY

## 2023-09-05 NOTE — PROGRESS NOTES
HPI     Diabetic Eye Exam     Additional comments: Here for annual  DM exam. BG been doing well. Denies   eye pain today. Does not use any eye gtts currently. No fam h/o eye   disease known.   Lab Results       Component                Value               Date                       HGBA1C                   4.8                 04/21/2023                 HGBA1C                   5.8                 01/04/2023                 HGBA1C                   5.4                 06/07/2022                      Last edited by Blessing Kenny on 9/5/2023  1:00 PM.            Assessment /Plan     For exam results, see Encounter Report.    Type 2 diabetes mellitus without retinopathy  -     Ambulatory referral/consult to Ophthalmology    Choroidal nevus, left eye      1. Type 2 diabetes mellitus without retinopathy  Diabetes without retinopathy, discussed with patient importance of glucose control and follow up.  Patient voices understanding.    2. Choroidal nevus, left eye  Flat, no concerning features  Observe    F/u 1 year, routine exam

## 2023-10-11 ENCOUNTER — LAB VISIT (OUTPATIENT)
Dept: LAB | Facility: HOSPITAL | Age: 48
End: 2023-10-11
Attending: INTERNAL MEDICINE
Payer: COMMERCIAL

## 2023-10-11 DIAGNOSIS — R19.7 DIARRHEA OF PRESUMED INFECTIOUS ORIGIN: Primary | ICD-10-CM

## 2023-10-11 LAB
C DIFF GDH STL QL: NEGATIVE
C DIFF TOX A+B STL QL IA: NEGATIVE

## 2023-10-11 PROCEDURE — 87449 NOS EACH ORGANISM AG IA: CPT | Performed by: INTERNAL MEDICINE

## 2023-10-20 ENCOUNTER — TELEPHONE (OUTPATIENT)
Dept: FAMILY MEDICINE | Facility: CLINIC | Age: 48
End: 2023-10-20
Payer: COMMERCIAL

## 2023-10-20 NOTE — TELEPHONE ENCOUNTER
----- Message from Tawana Barlow sent at 10/20/2023  2:56 PM CDT -----  Contact: Aishwarya with [facility]  Type:  Pharmacy Calling to Clarify an RX    Name of Caller:  Aishwarya Chatman    Pharmacy Name:  [facility]    Prescription Name:  semaglutide (OZEMPIC) 1 mg/dose (4 mg/3 mL)    What do they need to clarify?:  Instructions / Frequency / Dosage    Best Call Back Number:  [phone] / [phone] Option 5    Additional Information: States patient is transferring prescription to [facility] mail order pharmacy - please call to advise - thank you

## 2023-10-24 ENCOUNTER — PATIENT MESSAGE (OUTPATIENT)
Dept: FAMILY MEDICINE | Facility: CLINIC | Age: 48
End: 2023-10-24
Payer: COMMERCIAL

## 2023-10-26 DIAGNOSIS — E78.5 HYPERLIPIDEMIA, UNSPECIFIED HYPERLIPIDEMIA TYPE: Primary | ICD-10-CM

## 2023-10-26 DIAGNOSIS — E11.9 TYPE 2 DIABETES MELLITUS WITHOUT COMPLICATION, WITHOUT LONG-TERM CURRENT USE OF INSULIN: ICD-10-CM

## 2023-10-27 ENCOUNTER — LAB VISIT (OUTPATIENT)
Dept: LAB | Facility: HOSPITAL | Age: 48
End: 2023-10-27
Attending: FAMILY MEDICINE
Payer: COMMERCIAL

## 2023-10-27 ENCOUNTER — TELEPHONE (OUTPATIENT)
Dept: FAMILY MEDICINE | Facility: CLINIC | Age: 48
End: 2023-10-27
Payer: COMMERCIAL

## 2023-10-27 DIAGNOSIS — E11.9 TYPE 2 DIABETES MELLITUS WITHOUT COMPLICATION, WITHOUT LONG-TERM CURRENT USE OF INSULIN: ICD-10-CM

## 2023-10-27 DIAGNOSIS — E78.5 HYPERLIPIDEMIA, UNSPECIFIED HYPERLIPIDEMIA TYPE: ICD-10-CM

## 2023-10-27 LAB
ALBUMIN SERPL BCP-MCNC: 4.2 G/DL (ref 3.5–5.2)
ALBUMIN/CREAT UR: 10.9 UG/MG (ref 0–30)
ALP SERPL-CCNC: 60 U/L (ref 55–135)
ALT SERPL W/O P-5'-P-CCNC: 13 U/L (ref 10–44)
ANION GAP SERPL CALC-SCNC: 3 MMOL/L (ref 8–16)
AST SERPL-CCNC: 12 U/L (ref 10–40)
BASOPHILS # BLD AUTO: 0.12 K/UL (ref 0–0.2)
BASOPHILS NFR BLD: 1 % (ref 0–1.9)
BILIRUB SERPL-MCNC: 0.6 MG/DL (ref 0.1–1)
BUN SERPL-MCNC: 18 MG/DL (ref 6–20)
CALCIUM SERPL-MCNC: 9.7 MG/DL (ref 8.7–10.5)
CHLORIDE SERPL-SCNC: 105 MMOL/L (ref 95–110)
CHOLEST SERPL-MCNC: 142 MG/DL (ref 120–199)
CHOLEST/HDLC SERPL: 3.2 {RATIO} (ref 2–5)
CO2 SERPL-SCNC: 31 MMOL/L (ref 23–29)
CREAT SERPL-MCNC: 1.1 MG/DL (ref 0.5–1.4)
CREAT UR-MCNC: 101.7 MG/DL (ref 23–375)
DIFFERENTIAL METHOD: ABNORMAL
EOSINOPHIL # BLD AUTO: 0.5 K/UL (ref 0–0.5)
EOSINOPHIL NFR BLD: 4.5 % (ref 0–8)
ERYTHROCYTE [DISTWIDTH] IN BLOOD BY AUTOMATED COUNT: 13.4 % (ref 11.5–14.5)
EST. GFR  (NO RACE VARIABLE): >60 ML/MIN/1.73 M^2
ESTIMATED AVG GLUCOSE: 103 MG/DL (ref 68–131)
GLUCOSE SERPL-MCNC: 85 MG/DL (ref 70–110)
HBA1C MFR BLD: 5.2 % (ref 4.5–6.2)
HCT VFR BLD AUTO: 43.7 % (ref 40–54)
HDLC SERPL-MCNC: 44 MG/DL (ref 40–75)
HDLC SERPL: 31 % (ref 20–50)
HGB BLD-MCNC: 14.4 G/DL (ref 14–18)
IMM GRANULOCYTES # BLD AUTO: 0.04 K/UL (ref 0–0.04)
IMM GRANULOCYTES NFR BLD AUTO: 0.3 % (ref 0–0.5)
LDLC SERPL CALC-MCNC: 81.4 MG/DL (ref 63–159)
LYMPHOCYTES # BLD AUTO: 3.1 K/UL (ref 1–4.8)
LYMPHOCYTES NFR BLD: 26.9 % (ref 18–48)
MCH RBC QN AUTO: 33.3 PG (ref 27–31)
MCHC RBC AUTO-ENTMCNC: 33 G/DL (ref 32–36)
MCV RBC AUTO: 101 FL (ref 82–98)
MICROALBUMIN UR DL<=1MG/L-MCNC: 11.1 UG/ML
MONOCYTES # BLD AUTO: 1.2 K/UL (ref 0.3–1)
MONOCYTES NFR BLD: 10.1 % (ref 4–15)
NEUTROPHILS # BLD AUTO: 6.6 K/UL (ref 1.8–7.7)
NEUTROPHILS NFR BLD: 57.2 % (ref 38–73)
NONHDLC SERPL-MCNC: 98 MG/DL
NRBC BLD-RTO: 0 /100 WBC
PLATELET # BLD AUTO: 241 K/UL (ref 150–450)
PMV BLD AUTO: 10.9 FL (ref 9.2–12.9)
POTASSIUM SERPL-SCNC: 4.7 MMOL/L (ref 3.5–5.1)
PROT SERPL-MCNC: 7.5 G/DL (ref 6–8.4)
RBC # BLD AUTO: 4.32 M/UL (ref 4.6–6.2)
SODIUM SERPL-SCNC: 139 MMOL/L (ref 136–145)
TRIGL SERPL-MCNC: 83 MG/DL (ref 30–150)
WBC # BLD AUTO: 11.61 K/UL (ref 3.9–12.7)

## 2023-10-27 PROCEDURE — 80053 COMPREHEN METABOLIC PANEL: CPT | Performed by: FAMILY MEDICINE

## 2023-10-27 PROCEDURE — 36415 COLL VENOUS BLD VENIPUNCTURE: CPT | Performed by: FAMILY MEDICINE

## 2023-10-27 PROCEDURE — 83036 HEMOGLOBIN GLYCOSYLATED A1C: CPT | Performed by: FAMILY MEDICINE

## 2023-10-27 PROCEDURE — 80061 LIPID PANEL: CPT | Performed by: FAMILY MEDICINE

## 2023-10-27 PROCEDURE — 85025 COMPLETE CBC W/AUTO DIFF WBC: CPT | Performed by: FAMILY MEDICINE

## 2023-10-27 PROCEDURE — 82043 UR ALBUMIN QUANTITATIVE: CPT | Performed by: FAMILY MEDICINE

## 2023-10-27 NOTE — TELEPHONE ENCOUNTER
----- Message from Helen Krause sent at 10/27/2023  1:44 PM CDT -----  Regarding: pharmacy  Contact: Davy with [facility]  Type:  Pharmacy Calling to Clarify an RX    Name of Caller:  Davy  Pharmacy Name:  [facility]  Prescription Name:  Ozempic  What do they need to clarify?:    Best Call Back Number:  [phone] option 5  Additional Information:  Davy needs to confirm the prescription and frequency. Please call Davy to give a verbal.Thanks!

## 2023-10-30 ENCOUNTER — OFFICE VISIT (OUTPATIENT)
Dept: FAMILY MEDICINE | Facility: CLINIC | Age: 48
End: 2023-10-30
Payer: COMMERCIAL

## 2023-10-30 VITALS
HEART RATE: 82 BPM | WEIGHT: 304.38 LBS | BODY MASS INDEX: 50.71 KG/M2 | TEMPERATURE: 98 F | DIASTOLIC BLOOD PRESSURE: 82 MMHG | OXYGEN SATURATION: 98 % | HEIGHT: 65 IN | SYSTOLIC BLOOD PRESSURE: 138 MMHG

## 2023-10-30 DIAGNOSIS — E53.8 B12 DEFICIENCY: ICD-10-CM

## 2023-10-30 DIAGNOSIS — D50.9 IRON DEFICIENCY ANEMIA, UNSPECIFIED IRON DEFICIENCY ANEMIA TYPE: Chronic | ICD-10-CM

## 2023-10-30 DIAGNOSIS — Z86.79 HISTORY OF SUPRAVENTRICULAR TACHYCARDIA: ICD-10-CM

## 2023-10-30 DIAGNOSIS — Z71.6 ENCOUNTER FOR SMOKING CESSATION COUNSELING: ICD-10-CM

## 2023-10-30 DIAGNOSIS — E78.00 PURE HYPERCHOLESTEROLEMIA: ICD-10-CM

## 2023-10-30 DIAGNOSIS — K27.9 PUD (PEPTIC ULCER DISEASE): ICD-10-CM

## 2023-10-30 DIAGNOSIS — G47.00 INSOMNIA, UNSPECIFIED TYPE: ICD-10-CM

## 2023-10-30 DIAGNOSIS — Z72.0 TOBACCO USE: ICD-10-CM

## 2023-10-30 DIAGNOSIS — E11.9 TYPE 2 DIABETES MELLITUS WITHOUT COMPLICATION, WITHOUT LONG-TERM CURRENT USE OF INSULIN: ICD-10-CM

## 2023-10-30 DIAGNOSIS — F41.9 ANXIETY: Primary | ICD-10-CM

## 2023-10-30 PROBLEM — R73.03 PREDIABETES: Status: RESOLVED | Noted: 2021-02-03 | Resolved: 2023-10-30

## 2023-10-30 PROCEDURE — 3008F BODY MASS INDEX DOCD: CPT | Mod: CPTII,S$GLB,,

## 2023-10-30 PROCEDURE — 1160F RVW MEDS BY RX/DR IN RCRD: CPT | Mod: CPTII,S$GLB,,

## 2023-10-30 PROCEDURE — 3008F PR BODY MASS INDEX (BMI) DOCUMENTED: ICD-10-PCS | Mod: CPTII,S$GLB,,

## 2023-10-30 PROCEDURE — 3044F HG A1C LEVEL LT 7.0%: CPT | Mod: CPTII,S$GLB,,

## 2023-10-30 PROCEDURE — 3066F PR DOCUMENTATION OF TREATMENT FOR NEPHROPATHY: ICD-10-PCS | Mod: CPTII,S$GLB,,

## 2023-10-30 PROCEDURE — 3079F PR MOST RECENT DIASTOLIC BLOOD PRESSURE 80-89 MM HG: ICD-10-PCS | Mod: CPTII,S$GLB,,

## 2023-10-30 PROCEDURE — 3061F NEG MICROALBUMINURIA REV: CPT | Mod: CPTII,S$GLB,,

## 2023-10-30 PROCEDURE — 1160F PR REVIEW ALL MEDS BY PRESCRIBER/CLIN PHARMACIST DOCUMENTED: ICD-10-PCS | Mod: CPTII,S$GLB,,

## 2023-10-30 PROCEDURE — 3075F PR MOST RECENT SYSTOLIC BLOOD PRESS GE 130-139MM HG: ICD-10-PCS | Mod: CPTII,S$GLB,,

## 2023-10-30 PROCEDURE — 3075F SYST BP GE 130 - 139MM HG: CPT | Mod: CPTII,S$GLB,,

## 2023-10-30 PROCEDURE — 3061F PR NEG MICROALBUMINURIA RESULT DOCUMENTED/REVIEW: ICD-10-PCS | Mod: CPTII,S$GLB,,

## 2023-10-30 PROCEDURE — 3079F DIAST BP 80-89 MM HG: CPT | Mod: CPTII,S$GLB,,

## 2023-10-30 PROCEDURE — 3044F PR MOST RECENT HEMOGLOBIN A1C LEVEL <7.0%: ICD-10-PCS | Mod: CPTII,S$GLB,,

## 2023-10-30 PROCEDURE — 1159F MED LIST DOCD IN RCRD: CPT | Mod: CPTII,S$GLB,,

## 2023-10-30 PROCEDURE — 99214 OFFICE O/P EST MOD 30 MIN: CPT | Mod: S$GLB,,,

## 2023-10-30 PROCEDURE — 1159F PR MEDICATION LIST DOCUMENTED IN MEDICAL RECORD: ICD-10-PCS | Mod: CPTII,S$GLB,,

## 2023-10-30 PROCEDURE — 99214 PR OFFICE/OUTPT VISIT, EST, LEVL IV, 30-39 MIN: ICD-10-PCS | Mod: S$GLB,,,

## 2023-10-30 PROCEDURE — 3066F NEPHROPATHY DOC TX: CPT | Mod: CPTII,S$GLB,,

## 2023-10-30 RX ORDER — POTASSIUM CHLORIDE 20 MEQ/1
20 TABLET, EXTENDED RELEASE ORAL DAILY
Qty: 90 TABLET | Refills: 0 | Status: CANCELLED | OUTPATIENT
Start: 2023-10-30

## 2023-10-30 RX ORDER — VARENICLINE TARTRATE 0.5 (11)-1
KIT ORAL
Qty: 1 EACH | Refills: 0 | Status: SHIPPED | OUTPATIENT
Start: 2023-10-30 | End: 2024-01-09 | Stop reason: DRUGHIGH

## 2023-10-30 RX ORDER — METFORMIN HYDROCHLORIDE 500 MG/1
500 TABLET, EXTENDED RELEASE ORAL 2 TIMES DAILY WITH MEALS
Qty: 180 TABLET | Refills: 1 | Status: SHIPPED | OUTPATIENT
Start: 2023-10-30

## 2023-10-30 RX ORDER — SERTRALINE HYDROCHLORIDE 100 MG/1
100 TABLET, FILM COATED ORAL DAILY
Qty: 90 TABLET | Refills: 1 | Status: ON HOLD | OUTPATIENT
Start: 2023-10-30 | End: 2023-12-04 | Stop reason: HOSPADM

## 2023-10-30 RX ORDER — ZOLPIDEM TARTRATE 10 MG/1
10 TABLET ORAL NIGHTLY PRN
Qty: 30 TABLET | Refills: 2 | Status: SHIPPED | OUTPATIENT
Start: 2023-10-30 | End: 2024-01-09 | Stop reason: SDUPTHER

## 2023-10-30 RX ORDER — METOPROLOL SUCCINATE 50 MG/1
50 TABLET, EXTENDED RELEASE ORAL DAILY
Qty: 90 TABLET | Refills: 1 | Status: SHIPPED | OUTPATIENT
Start: 2023-10-30

## 2023-10-30 RX ORDER — BUSPIRONE HYDROCHLORIDE 7.5 MG/1
7.5 TABLET ORAL 3 TIMES DAILY
Qty: 90 TABLET | Refills: 1 | Status: ON HOLD | OUTPATIENT
Start: 2023-10-30 | End: 2023-12-04 | Stop reason: SDUPTHER

## 2023-10-30 RX ORDER — SEMAGLUTIDE 1.34 MG/ML
1 INJECTION, SOLUTION SUBCUTANEOUS
Qty: 3 EACH | Refills: 1 | Status: SHIPPED | OUTPATIENT
Start: 2023-10-30

## 2023-10-30 RX ORDER — PANTOPRAZOLE SODIUM 40 MG/1
40 TABLET, DELAYED RELEASE ORAL 2 TIMES DAILY
Qty: 180 TABLET | Refills: 3 | Status: SHIPPED | OUTPATIENT
Start: 2023-10-30 | End: 2024-10-29

## 2023-10-30 RX ORDER — SIMVASTATIN 20 MG/1
20 TABLET, FILM COATED ORAL NIGHTLY
Qty: 90 TABLET | Refills: 1 | Status: SHIPPED | OUTPATIENT
Start: 2023-10-30 | End: 2024-02-13

## 2023-10-30 NOTE — PROGRESS NOTES
Subjective:       Patient ID: Jacoby Jean-Baptiste is a 48 y.o. male.    Chief Complaint: Follow-up    Devin Jean-Baptiste is a 48-year-old male patient presents to clinic today for medication refills.  He has a history of anxiety and has been taking Zoloft 50 mg daily.  Recently has noticed an increase in his anxiety.  He feels some of his anxiety may be related to his recent health and overall not feeling well lately.  He recently recovered from C diff but has still been feeling worn down.  He also has a history of ulcerative colitis and feels he may be having a flare.  He is followed by Dr. Avitia.  And has reached out to his office and is waiting for a call back.  He is scheduled for his Remicade infusion on .  He has been having some abdominal cramps in his taking Bentyl which does help some.  Other history is type 2 diabetes with his most recent H A1c of 5.2.  He is currently taking metformin and Ozempic.  History of SVT and palpitations and is doing well on metoprolol.  Hyperlipidemia: last lipids: TC:  142, Tri, HDL:  44, LDL:  81.4, currently taking simvastatin 20 mg daily.  He tolerates this well denies myalgias.  He denies chest pain or shortness a breath.  GERD doing well on Pepcid.  Insomnia controlled well with Ambien.  He is an everyday smoker and is wanting to quit.  He is requesting Chantix.              Review of patient's allergies indicates:  No Known Allergies  Social Determinants of Health     Tobacco Use: High Risk (10/30/2023)    Patient History     Smoking Tobacco Use: Every Day     Smokeless Tobacco Use: Never     Passive Exposure: Not on file   Alcohol Use: Not At Risk (2023)    AUDIT-C     Frequency of Alcohol Consumption: Never     Average Number of Drinks: Patient does not drink     Frequency of Binge Drinking: Never   Financial Resource Strain: Low Risk  (2023)    Overall Financial Resource Strain (CARDIA)     Difficulty of Paying Living Expenses: Not very hard   Food  Insecurity: No Food Insecurity (4/21/2023)    Hunger Vital Sign     Worried About Running Out of Food in the Last Year: Never true     Ran Out of Food in the Last Year: Never true   Transportation Needs: No Transportation Needs (4/21/2023)    PRAPARE - Transportation     Lack of Transportation (Medical): No     Lack of Transportation (Non-Medical): No   Physical Activity: Inactive (4/21/2023)    Exercise Vital Sign     Days of Exercise per Week: 0 days     Minutes of Exercise per Session: 0 min   Stress: No Stress Concern Present (4/21/2023)    [place] citizen [place] of Occupational Health - Occupational Stress Questionnaire     Feeling of Stress : Only a little   Social Connections: Socially Isolated (4/21/2023)    Social Connection and Isolation Panel [NHANES]     Frequency of Communication with Friends and Family: Twice a week     Frequency of Social Gatherings with Friends and Family: Twice a week     Attends [employer] Services: Never     Active Member of Clubs or Organizations: No     Attends Club or Organization Meetings: Never     Marital Status: Never    Housing Stability: Low Risk  (4/21/2023)    Housing Stability Vital Sign     Unable to Pay for Housing in the Last Year: No     Number of Places Lived in the Last Year: 1     Unstable Housing in the Last Year: No   Depression: Low Risk  (10/30/2023)    Depression     Last PHQ-4: Flowsheet Data: 0      Past Medical History:   Diagnosis Date    Diabetes mellitus 01/2022    Hyperlipidemia 2015    Hypertension 2015    Insomnia 2015      Past Surgical History:   Procedure Laterality Date    CARDIAC ELECTROPHYSIOLOGY MAPPING AND ABLATION  2017    SVT    CHOLECYSTECTOMY  2011    COLONOSCOPY N/A 5/3/2022    Procedure: COLONOSCOPY;  Surgeon: Shan Jean III, MD;  Location: [facility];  Service: Endoscopy;  Laterality: N/A;    COLONOSCOPY WITH FECAL MICROBIOTA TRANSFER N/A 3/21/2023    Procedure: COLONOSCOPY, WITH FECAL MICROBIOTA TRANSFER;  Surgeon: David  MD Monty;  Location: [facility] ENDO;  Service: Endoscopy;  Laterality: N/A;    ESOPHAGOGASTRODUODENOSCOPY N/A 4/24/2023    Procedure: EGD (ESOPHAGOGASTRODUODENOSCOPY);  Surgeon: Shan Jean III, MD;  Location: [facility] ENDO;  Service: Endoscopy;  Laterality: N/A;    GANGLION CYST EXCISION Left 1992    UMBILICAL HERNIA REPAIR  2011    with mesh      Social History     Socioeconomic History    Marital status: Single         Current Outpatient Medications:     potassium chloride SA (K-DUR,KLOR-CON) 20 MEQ tablet, Take 1 tablet (20 mEq total) by mouth once daily., Disp: 90 tablet, Rfl: 0    promethazine (PHENERGAN) 25 MG tablet, Take 25-50 mg by mouth every 8 (eight) hours as needed., Disp: , Rfl:     budesonide (ENTOCORT EC) 3 mg capsule, Take 9 mg by mouth once daily., Disp: , Rfl:     busPIRone (BUSPAR) 7.5 MG tablet, Take 1 tablet (7.5 mg total) by mouth 3 (three) times daily., Disp: 90 tablet, Rfl: 1    metFORMIN (GLUCOPHAGE-XR) 500 MG ER 24hr tablet, Take 1 tablet (500 mg total) by mouth 2 (two) times daily with meals., Disp: 180 tablet, Rfl: 1    metoprolol succinate (TOPROL-XL) 50 MG 24 hr tablet, Take 1 tablet (50 mg total) by mouth once daily., Disp: 90 tablet, Rfl: 1    pantoprazole (PROTONIX) 40 MG tablet, Take 1 tablet (40 mg total) by mouth 2 (two) times daily., Disp: 180 tablet, Rfl: 3    semaglutide (OZEMPIC) 1 mg/dose (4 mg/3 mL), Inject 1 mg into the skin every 7 days., Disp: 3 each, Rfl: 1    sertraline (ZOLOFT) 100 MG tablet, Take 1 tablet (100 mg total) by mouth once daily., Disp: 90 tablet, Rfl: 1    simvastatin (ZOCOR) 20 MG tablet, Take 1 tablet (20 mg total) by mouth every evening., Disp: 90 tablet, Rfl: 1    varenicline (CHANTIX STARTING MONTH BOX) 0.5 mg (11)- 1 mg (42) tablet, Take one 0.5mg tab by mouth once daily X3 days,then increase to one 0.5mg tab twice daily X4 days,then increase to one 1mg tab twice daily, Disp: 1 each, Rfl: 0    zolpidem (AMBIEN) 10 mg Tab, Take 1 tablet (10 mg  total) by mouth nightly as needed (insomnia)., Disp: 30 tablet, Rfl: 2  No current facility-administered medications for this visit.    Facility-Administered Medications Ordered in Other Visits:     lactated ringers infusion, , Intravenous, Continuous, David Millan MD, Last Rate: 75 mL/hr at 03/21/23 1252, New Bag at 03/21/23 1252    Lab Results   Component Value Date    WBC 11.61 10/27/2023    HGB 14.4 10/27/2023    HCT 43.7 10/27/2023     10/27/2023    CHOL 142 10/27/2023    TRIG 83 10/27/2023    HDL 44 10/27/2023    ALT 13 10/27/2023    AST 12 10/27/2023     10/27/2023    K 4.7 10/27/2023     10/27/2023    CREATININE 1.1 10/27/2023    BUN 18 10/27/2023    CO2 31 (H) 10/27/2023    TSH 0.550 05/24/2023    PSA 0.24 03/10/2021    HGBA1C 5.2 10/27/2023    MICROALBUR 1.2 01/26/2022       Review of Systems   Constitutional:  Positive for fatigue. Negative for chills and fever.   Respiratory:  Negative for chest tightness and shortness of breath.    Cardiovascular:  Negative for chest pain, palpitations and leg swelling.   Gastrointestinal:  Positive for abdominal pain and nausea.   Neurological: Negative.    Psychiatric/Behavioral:  Negative for dysphoric mood and suicidal ideas. The patient is nervous/anxious.        Objective:      Physical Exam  Vitals reviewed.   Constitutional:       General: He is not in acute distress.     Appearance: He is obese.   Cardiovascular:      Rate and Rhythm: Normal rate and regular rhythm.      Pulses: Normal pulses.      Heart sounds: Normal heart sounds.   Pulmonary:      Effort: Pulmonary effort is normal.      Breath sounds: Normal breath sounds.   Abdominal:      General: Bowel sounds are normal.      Palpations: Abdomen is soft.      Tenderness: There is abdominal tenderness.      Comments: Generalized tenderness   Skin:     General: Skin is warm and dry.      Capillary Refill: Capillary refill takes less than 2 seconds.   Neurological:      Mental  Status: He is alert and oriented to person, place, and time.   Psychiatric:         Attention and Perception: Attention normal.         Mood and Affect: Mood normal. Affect is flat.         Speech: Speech normal.         Behavior: Behavior normal. Behavior is cooperative.         Thought Content: Thought content normal. Thought content does not include homicidal or suicidal ideation. Thought content does not include homicidal or suicidal plan.         Judgment: Judgment normal.         Assessment:       1. Anxiety    2. Type 2 diabetes mellitus without complication, without long-term current use of insulin    3. Pure hypercholesterolemia    4. History of supraventricular tachycardia    5. PUD (peptic ulcer disease)    6. B12 deficiency    7. Iron deficiency anemia, unspecified iron deficiency anemia type    8. Insomnia, unspecified type    9. Tobacco use    10. Encounter for smoking cessation counseling        Plan:       Jacoby was seen today for follow-up.    Diagnoses and all orders for this visit:    Anxiety  -     sertraline (ZOLOFT) 100 MG tablet; Take 1 tablet (100 mg total) by mouth once daily.  -     busPIRone (BUSPAR) 7.5 MG tablet; Take 1 tablet (7.5 mg total) by mouth 3 (three) times daily.    Type 2 diabetes mellitus without complication, without long-term current use of insulin  -     metFORMIN (GLUCOPHAGE-XR) 500 MG ER 24hr tablet; Take 1 tablet (500 mg total) by mouth 2 (two) times daily with meals.  -     semaglutide (OZEMPIC) 1 mg/dose (4 mg/3 mL); Inject 1 mg into the skin every 7 days.    Pure hypercholesterolemia  -     simvastatin (ZOCOR) 20 MG tablet; Take 1 tablet (20 mg total) by mouth every evening.    History of supraventricular tachycardia  -     metoprolol succinate (TOPROL-XL) 50 MG 24 hr tablet; Take 1 tablet (50 mg total) by mouth once daily.    PUD (peptic ulcer disease)  -     pantoprazole (PROTONIX) 40 MG tablet; Take 1 tablet (40 mg total) by mouth 2 (two) times daily.    B12  deficiency  -     Vitamin B12; Future    Iron deficiency anemia, unspecified iron deficiency anemia type    Insomnia, unspecified type  -     zolpidem (AMBIEN) 10 mg Tab; Take 1 tablet (10 mg total) by mouth nightly as needed (insomnia).    Tobacco use  -     varenicline (CHANTIX STARTING MONTH BOX) 0.5 mg (11)- 1 mg (42) tablet; Take one 0.5mg tab by mouth once daily X3 days,then increase to one 0.5mg tab twice daily X4 days,then increase to one 1mg tab twice daily    Encounter for smoking cessation counseling  -     varenicline (CHANTIX STARTING MONTH BOX) 0.5 mg (11)- 1 mg (42) tablet; Take one 0.5mg tab by mouth once daily X3 days,then increase to one 0.5mg tab twice daily X4 days,then increase to one 1mg tab twice daily    Other orders  The following orders have not been finalized:  -     Cancel: potassium chloride SA (K-DUR,KLOR-CON) 20 MEQ tablet    Anxiety   - increase Zoloft to 100 mg daily  - start BuSpar 7.5 mg b.i.d. may increase to t.i.d. if needed  - follow-up in clinic in one-month to reassess    Type 2 diabetes   - continue metformin and Ozempic  - ADA diet  - increase physical activity  - weight loss    Hyperlipidemia   - continue simvastatin   - I recommend a heart healthy diet rich in fiber, fresh vegetables and fruit and low in saturated fats (fried foods, red meat, etc.).  I also recommend regular exercise.    SVT   - continue metoprolol    Peptic ulcer disease  - continue pantoprazole  - avoid trigger foods, eat smaller meals, avoid NSAIDs    Insomnia   - continue Ambien.  Refilled x3 months,  checked.  - follow-up in 3 months for refills    Tobacco use   - Chantix as prescribed  - recommend smoking cessation clinic, patient will consider.    Follow-up with Gastroenterology.  Follow-up in 1 month to reassess anxiety.

## 2023-11-01 ENCOUNTER — LAB VISIT (OUTPATIENT)
Dept: LAB | Facility: HOSPITAL | Age: 48
End: 2023-11-01
Payer: COMMERCIAL

## 2023-11-01 DIAGNOSIS — E53.8 B12 DEFICIENCY: ICD-10-CM

## 2023-11-01 LAB — VIT B12 SERPL-MCNC: 290 PG/ML (ref 210–950)

## 2023-11-01 PROCEDURE — 82607 VITAMIN B-12: CPT

## 2023-11-01 PROCEDURE — 36415 COLL VENOUS BLD VENIPUNCTURE: CPT

## 2023-11-02 ENCOUNTER — TELEPHONE (OUTPATIENT)
Dept: FAMILY MEDICINE | Facility: CLINIC | Age: 48
End: 2023-11-02
Payer: COMMERCIAL

## 2023-11-02 NOTE — TELEPHONE ENCOUNTER
----- Message from Radhika Espinoza sent at 11/2/2023 10:20 AM CDT -----  Contact: p  Type:  RX Refill Request    Who Called:  gato with pharmacy  Refill or New Rx:  refill  RX Name and Strength:  semaglutide (OZEMPIC) 1 mg/dose (4 mg/3 mL)  How is the patient currently taking it? (ex. 1XDay):  as directed  Is this a 30 day or 90 day RX:  90  Preferred Pharmacy with phone number:    [facility]    Local or Mail Order:  mail  Ordering Provider:  ronel Elias Call Back Number:   [phone]  Additional Information:  needs a verbal for prescription

## 2023-11-17 ENCOUNTER — HOSPITAL ENCOUNTER (INPATIENT)
Facility: HOSPITAL | Age: 48
LOS: 6 days | Discharge: HOME | DRG: 871 | End: 2023-11-23
Attending: EMERGENCY MEDICINE | Admitting: INTERNAL MEDICINE
Payer: COMMERCIAL

## 2023-11-17 ENCOUNTER — CLINICAL SUPPORT (OUTPATIENT)
Dept: CARDIOLOGY | Facility: HOSPITAL | Age: 48
DRG: 871 | End: 2023-11-17
Attending: EMERGENCY MEDICINE
Payer: COMMERCIAL

## 2023-11-17 VITALS — WEIGHT: 300.06 LBS | BODY MASS INDEX: 49.99 KG/M2 | HEIGHT: 65 IN

## 2023-11-17 DIAGNOSIS — I48.92 ATRIAL FIBRILLATION AND FLUTTER: ICD-10-CM

## 2023-11-17 DIAGNOSIS — J18.9 PNEUMONIA OF BOTH LOWER LOBES DUE TO INFECTIOUS ORGANISM: ICD-10-CM

## 2023-11-17 DIAGNOSIS — R06.02 SOB (SHORTNESS OF BREATH): ICD-10-CM

## 2023-11-17 DIAGNOSIS — R07.9 CHEST PAIN: ICD-10-CM

## 2023-11-17 DIAGNOSIS — I76 SEPTIC EMBOLISM: Primary | ICD-10-CM

## 2023-11-17 DIAGNOSIS — I76 CEREBRAL SEPTIC EMBOLI: ICD-10-CM

## 2023-11-17 DIAGNOSIS — I66.9 CEREBRAL SEPTIC EMBOLI: ICD-10-CM

## 2023-11-17 DIAGNOSIS — R78.81 BACTEREMIA: ICD-10-CM

## 2023-11-17 DIAGNOSIS — I48.91 ATRIAL FIBRILLATION AND FLUTTER: ICD-10-CM

## 2023-11-17 PROBLEM — E83.42 HYPOMAGNESEMIA: Status: ACTIVE | Noted: 2023-11-17

## 2023-11-17 LAB
ALBUMIN SERPL BCP-MCNC: 4.4 G/DL (ref 3.5–5.2)
ALP SERPL-CCNC: 70 U/L (ref 55–135)
ALT SERPL W/O P-5'-P-CCNC: 27 U/L (ref 10–44)
ANION GAP SERPL CALC-SCNC: 8 MMOL/L (ref 8–16)
AORTIC ROOT ANNULUS: 2.8 CM
AORTIC VALVE CUSP SEPERATION: 1.8 CM
AST SERPL-CCNC: 25 U/L (ref 10–40)
AV INDEX (PROSTH): 0.66
AV MEAN GRADIENT: 7 MMHG
AV PEAK GRADIENT: 14 MMHG
AV VALVE AREA BY VELOCITY RATIO: 1.81 CM²
AV VALVE AREA: 2.06 CM²
AV VELOCITY RATIO: 0.58
BACTERIA #/AREA URNS HPF: NEGATIVE /HPF
BASOPHILS # BLD AUTO: 0.03 K/UL (ref 0–0.2)
BASOPHILS NFR BLD: 0.2 % (ref 0–1.9)
BILIRUB SERPL-MCNC: 0.6 MG/DL (ref 0.1–1)
BILIRUB UR QL STRIP: NEGATIVE
BNP SERPL-MCNC: 18 PG/ML (ref 0–99)
BSA FOR ECHO PROCEDURE: 2.5 M2
BUN SERPL-MCNC: 11 MG/DL (ref 6–20)
CALCIUM SERPL-MCNC: 9.4 MG/DL (ref 8.7–10.5)
CHLORIDE SERPL-SCNC: 102 MMOL/L (ref 95–110)
CLARITY UR: CLEAR
CO2 SERPL-SCNC: 26 MMOL/L (ref 23–29)
COLOR UR: YELLOW
CREAT SERPL-MCNC: 1.1 MG/DL (ref 0.5–1.4)
CRP SERPL-MCNC: 228.2 MG/L (ref 0–8.2)
CV ECHO LV RWT: 0.49 CM
DIFFERENTIAL METHOD: ABNORMAL
DOP CALC AO PEAK VEL: 1.86 M/S
DOP CALC AO VTI: 23.2 CM
DOP CALC LVOT AREA: 3.1 CM2
DOP CALC LVOT DIAMETER: 2 CM
DOP CALC LVOT PEAK VEL: 1.07 M/S
DOP CALC LVOT STROKE VOLUME: 47.73 CM3
DOP CALC MV VTI: 24.6 CM
DOP CALCLVOT PEAK VEL VTI: 15.2 CM
E WAVE DECELERATION TIME: 130 MSEC
E/A RATIO: 0.7
E/E' RATIO: 7.5 M/S
ECHO LV POSTERIOR WALL: 1.18 CM (ref 0.6–1.1)
EOSINOPHIL # BLD AUTO: 0 K/UL (ref 0–0.5)
EOSINOPHIL NFR BLD: 0.2 % (ref 0–8)
ERYTHROCYTE [DISTWIDTH] IN BLOOD BY AUTOMATED COUNT: 13.7 % (ref 11.5–14.5)
ERYTHROCYTE [SEDIMENTATION RATE] IN BLOOD BY WESTERGREN METHOD: 32 MM/HR (ref 0–10)
EST. GFR  (NO RACE VARIABLE): >60 ML/MIN/1.73 M^2
FRACTIONAL SHORTENING: 28 % (ref 28–44)
GLUCOSE SERPL-MCNC: 111 MG/DL (ref 70–110)
GLUCOSE SERPL-MCNC: 95 MG/DL (ref 70–110)
GLUCOSE UR QL STRIP: NEGATIVE
HCT VFR BLD AUTO: 46.4 % (ref 40–54)
HGB BLD-MCNC: 16.1 G/DL (ref 14–18)
HGB UR QL STRIP: ABNORMAL
HYALINE CASTS #/AREA URNS LPF: 44 /LPF
IMM GRANULOCYTES # BLD AUTO: 0.03 K/UL (ref 0–0.04)
IMM GRANULOCYTES NFR BLD AUTO: 0.2 % (ref 0–0.5)
INFLUENZA A, MOLECULAR: NEGATIVE
INFLUENZA B, MOLECULAR: NEGATIVE
INTERVENTRICULAR SEPTUM: 0.92 CM (ref 0.6–1.1)
IVC DIAMETER: 1.6 CM
KETONES UR QL STRIP: ABNORMAL
LACTATE SERPL-SCNC: 1.5 MMOL/L (ref 0.5–1.9)
LACTATE SERPL-SCNC: 2.1 MMOL/L (ref 0.5–1.9)
LEFT INTERNAL DIMENSION IN SYSTOLE: 3.48 CM (ref 2.1–4)
LEFT VENTRICLE DIASTOLIC VOLUME INDEX: 47.23 ML/M2
LEFT VENTRICLE DIASTOLIC VOLUME: 111 ML
LEFT VENTRICLE MASS INDEX: 79 G/M2
LEFT VENTRICLE SYSTOLIC VOLUME INDEX: 21.4 ML/M2
LEFT VENTRICLE SYSTOLIC VOLUME: 50.2 ML
LEFT VENTRICULAR INTERNAL DIMENSION IN DIASTOLE: 4.86 CM (ref 3.5–6)
LEFT VENTRICULAR MASS: 185.61 G
LEUKOCYTE ESTERASE UR QL STRIP: NEGATIVE
LIPASE SERPL-CCNC: 8 U/L (ref 4–60)
LV LATERAL E/E' RATIO: 6 M/S
LV SEPTAL E/E' RATIO: 10 M/S
LVOT MG: 3 MMHG
LVOT MV: 0.74 CM/S
LYMPHOCYTES # BLD AUTO: 1.1 K/UL (ref 1–4.8)
LYMPHOCYTES NFR BLD: 8.9 % (ref 18–48)
MAGNESIUM SERPL-MCNC: 1.3 MG/DL (ref 1.6–2.6)
MCH RBC QN AUTO: 34.6 PG (ref 27–31)
MCHC RBC AUTO-ENTMCNC: 34.7 G/DL (ref 32–36)
MCV RBC AUTO: 100 FL (ref 82–98)
MICROSCOPIC COMMENT: ABNORMAL
MONOCYTES # BLD AUTO: 1.1 K/UL (ref 0.3–1)
MONOCYTES NFR BLD: 8.5 % (ref 4–15)
MV MEAN GRADIENT: 5 MMHG
MV PEAK A VEL: 1.28 M/S
MV PEAK E VEL: 0.9 M/S
MV PEAK GRADIENT: 11 MMHG
MV VALVE AREA BY CONTINUITY EQUATION: 1.94 CM2
NEUTROPHILS # BLD AUTO: 10.3 K/UL (ref 1.8–7.7)
NEUTROPHILS NFR BLD: 82 % (ref 38–73)
NITRITE UR QL STRIP: NEGATIVE
NRBC BLD-RTO: 0 /100 WBC
PH UR STRIP: 6 [PH] (ref 5–8)
PLATELET # BLD AUTO: 145 K/UL (ref 150–450)
PMV BLD AUTO: 11.3 FL (ref 9.2–12.9)
POTASSIUM SERPL-SCNC: 4.1 MMOL/L (ref 3.5–5.1)
PROCALCITONIN SERPL IA-MCNC: 17.06 NG/ML (ref 0–0.5)
PROT SERPL-MCNC: 8.4 G/DL (ref 6–8.4)
PROT UR QL STRIP: ABNORMAL
PV MV: 1.15 M/S
PV PEAK GRADIENT: 11 MMHG
PV PEAK VELOCITY: 1.65 M/S
RA PRESSURE ESTIMATED: 3 MMHG
RBC # BLD AUTO: 4.65 M/UL (ref 4.6–6.2)
RBC #/AREA URNS HPF: 5 /HPF (ref 0–4)
RIGHT VENTRICULAR END-DIASTOLIC DIMENSION: 2.47 CM
RV TISSUE DOPPLER FREE WALL SYSTOLIC VELOCITY 1 (APICAL 4 CHAMBER VIEW): 9.03 CM/S
SARS-COV-2 RDRP RESP QL NAA+PROBE: NEGATIVE
SODIUM SERPL-SCNC: 136 MMOL/L (ref 136–145)
SP GR UR STRIP: 1.03 (ref 1–1.03)
SPECIMEN SOURCE: NORMAL
SQUAMOUS #/AREA URNS HPF: 3 /HPF
TDI LATERAL: 0.15 M/S
TDI SEPTAL: 0.09 M/S
TDI: 0.12 M/S
TROPONIN I SERPL HS-MCNC: 7.2 PG/ML (ref 0–14.9)
TROPONIN I SERPL HS-MCNC: 7.2 PG/ML (ref 0–14.9)
URN SPEC COLLECT METH UR: ABNORMAL
UROBILINOGEN UR STRIP-ACNC: NEGATIVE EU/DL
WBC # BLD AUTO: 12.53 K/UL (ref 3.9–12.7)
WBC #/AREA URNS HPF: 3 /HPF (ref 0–5)
Z-SCORE OF LEFT VENTRICULAR DIMENSION IN END DIASTOLE: -7.04
Z-SCORE OF LEFT VENTRICULAR DIMENSION IN END SYSTOLE: -4.18

## 2023-11-17 PROCEDURE — 25000003 PHARM REV CODE 250: Performed by: EMERGENCY MEDICINE

## 2023-11-17 PROCEDURE — 85025 COMPLETE CBC W/AUTO DIFF WBC: CPT | Performed by: EMERGENCY MEDICINE

## 2023-11-17 PROCEDURE — 85651 RBC SED RATE NONAUTOMATED: CPT | Performed by: PHYSICAL THERAPY ASSISTANT

## 2023-11-17 PROCEDURE — 93010 ELECTROCARDIOGRAM REPORT: CPT | Mod: 76,,, | Performed by: INTERNAL MEDICINE

## 2023-11-17 PROCEDURE — 93010 EKG 12-LEAD: ICD-10-PCS | Mod: 76,,, | Performed by: INTERNAL MEDICINE

## 2023-11-17 PROCEDURE — 25000003 PHARM REV CODE 250: Performed by: PHYSICAL THERAPY ASSISTANT

## 2023-11-17 PROCEDURE — 87040 BLOOD CULTURE FOR BACTERIA: CPT | Performed by: EMERGENCY MEDICINE

## 2023-11-17 PROCEDURE — 63600175 PHARM REV CODE 636 W HCPCS: Performed by: INTERNAL MEDICINE

## 2023-11-17 PROCEDURE — 63600175 PHARM REV CODE 636 W HCPCS: Performed by: EMERGENCY MEDICINE

## 2023-11-17 PROCEDURE — 36415 COLL VENOUS BLD VENIPUNCTURE: CPT | Performed by: EMERGENCY MEDICINE

## 2023-11-17 PROCEDURE — 99900035 HC TECH TIME PER 15 MIN (STAT)

## 2023-11-17 PROCEDURE — 25000242 PHARM REV CODE 250 ALT 637 W/ HCPCS: Performed by: INTERNAL MEDICINE

## 2023-11-17 PROCEDURE — 80053 COMPREHEN METABOLIC PANEL: CPT | Performed by: EMERGENCY MEDICINE

## 2023-11-17 PROCEDURE — 83880 ASSAY OF NATRIURETIC PEPTIDE: CPT | Performed by: EMERGENCY MEDICINE

## 2023-11-17 PROCEDURE — 96365 THER/PROPH/DIAG IV INF INIT: CPT

## 2023-11-17 PROCEDURE — 83690 ASSAY OF LIPASE: CPT | Performed by: EMERGENCY MEDICINE

## 2023-11-17 PROCEDURE — 63600175 PHARM REV CODE 636 W HCPCS: Performed by: PHYSICAL THERAPY ASSISTANT

## 2023-11-17 PROCEDURE — 84145 PROCALCITONIN (PCT): CPT | Performed by: PHYSICAL THERAPY ASSISTANT

## 2023-11-17 PROCEDURE — 94761 N-INVAS EAR/PLS OXIMETRY MLT: CPT

## 2023-11-17 PROCEDURE — 93306 ECHO (CUPID ONLY): ICD-10-PCS | Mod: 26,,, | Performed by: INTERNAL MEDICINE

## 2023-11-17 PROCEDURE — 99900031 HC PATIENT EDUCATION (STAT)

## 2023-11-17 PROCEDURE — 93005 ELECTROCARDIOGRAM TRACING: CPT | Performed by: INTERNAL MEDICINE

## 2023-11-17 PROCEDURE — 86140 C-REACTIVE PROTEIN: CPT | Performed by: PHYSICAL THERAPY ASSISTANT

## 2023-11-17 PROCEDURE — 94799 UNLISTED PULMONARY SVC/PX: CPT

## 2023-11-17 PROCEDURE — 96375 TX/PRO/DX INJ NEW DRUG ADDON: CPT

## 2023-11-17 PROCEDURE — 21400001 HC TELEMETRY ROOM

## 2023-11-17 PROCEDURE — 25500020 PHARM REV CODE 255: Performed by: EMERGENCY MEDICINE

## 2023-11-17 PROCEDURE — 25000242 PHARM REV CODE 250 ALT 637 W/ HCPCS: Performed by: EMERGENCY MEDICINE

## 2023-11-17 PROCEDURE — 94640 AIRWAY INHALATION TREATMENT: CPT

## 2023-11-17 PROCEDURE — 87502 INFLUENZA DNA AMP PROBE: CPT | Performed by: EMERGENCY MEDICINE

## 2023-11-17 PROCEDURE — 99285 EMERGENCY DEPT VISIT HI MDM: CPT | Mod: 25

## 2023-11-17 PROCEDURE — U0002 COVID-19 LAB TEST NON-CDC: HCPCS | Performed by: EMERGENCY MEDICINE

## 2023-11-17 PROCEDURE — 83605 ASSAY OF LACTIC ACID: CPT | Mod: 91 | Performed by: EMERGENCY MEDICINE

## 2023-11-17 PROCEDURE — 83735 ASSAY OF MAGNESIUM: CPT | Performed by: EMERGENCY MEDICINE

## 2023-11-17 PROCEDURE — 93306 TTE W/DOPPLER COMPLETE: CPT

## 2023-11-17 PROCEDURE — 25000003 PHARM REV CODE 250: Performed by: INTERNAL MEDICINE

## 2023-11-17 PROCEDURE — 93306 TTE W/DOPPLER COMPLETE: CPT | Mod: 26,,, | Performed by: INTERNAL MEDICINE

## 2023-11-17 PROCEDURE — 27000221 HC OXYGEN, UP TO 24 HOURS

## 2023-11-17 PROCEDURE — 81001 URINALYSIS AUTO W/SCOPE: CPT | Performed by: EMERGENCY MEDICINE

## 2023-11-17 PROCEDURE — 84484 ASSAY OF TROPONIN QUANT: CPT | Mod: 91 | Performed by: EMERGENCY MEDICINE

## 2023-11-17 PROCEDURE — 80307 DRUG TEST PRSMV CHEM ANLYZR: CPT | Performed by: HOSPITALIST

## 2023-11-17 RX ORDER — IBUPROFEN 200 MG
16 TABLET ORAL
Status: DISCONTINUED | OUTPATIENT
Start: 2023-11-17 | End: 2023-11-23 | Stop reason: HOSPADM

## 2023-11-17 RX ORDER — HYDROCODONE BITARTRATE AND ACETAMINOPHEN 5; 325 MG/1; MG/1
1 TABLET ORAL EVERY 6 HOURS PRN
Status: DISCONTINUED | OUTPATIENT
Start: 2023-11-17 | End: 2023-11-17

## 2023-11-17 RX ORDER — VARENICLINE TARTRATE 0.5 MG/1
0.5 TABLET, FILM COATED ORAL 2 TIMES DAILY
Status: DISCONTINUED | OUTPATIENT
Start: 2023-11-17 | End: 2023-11-23 | Stop reason: HOSPADM

## 2023-11-17 RX ORDER — SODIUM,POTASSIUM PHOSPHATES 280-250MG
2 POWDER IN PACKET (EA) ORAL
Status: DISCONTINUED | OUTPATIENT
Start: 2023-11-17 | End: 2023-11-23 | Stop reason: HOSPADM

## 2023-11-17 RX ORDER — METOPROLOL SUCCINATE 50 MG/1
50 TABLET, EXTENDED RELEASE ORAL DAILY
Status: DISCONTINUED | OUTPATIENT
Start: 2023-11-18 | End: 2023-11-21

## 2023-11-17 RX ORDER — PANTOPRAZOLE SODIUM 40 MG/1
40 TABLET, DELAYED RELEASE ORAL 2 TIMES DAILY
Status: DISCONTINUED | OUTPATIENT
Start: 2023-11-17 | End: 2023-11-23 | Stop reason: HOSPADM

## 2023-11-17 RX ORDER — INSULIN ASPART 100 [IU]/ML
0-10 INJECTION, SOLUTION INTRAVENOUS; SUBCUTANEOUS
Status: DISCONTINUED | OUTPATIENT
Start: 2023-11-17 | End: 2023-11-23 | Stop reason: HOSPADM

## 2023-11-17 RX ORDER — SODIUM CHLORIDE 9 MG/ML
INJECTION, SOLUTION INTRAVENOUS CONTINUOUS
Status: DISCONTINUED | OUTPATIENT
Start: 2023-11-17 | End: 2023-11-19

## 2023-11-17 RX ORDER — ONDANSETRON 2 MG/ML
4 INJECTION INTRAMUSCULAR; INTRAVENOUS EVERY 8 HOURS PRN
Status: DISCONTINUED | OUTPATIENT
Start: 2023-11-17 | End: 2023-11-23 | Stop reason: HOSPADM

## 2023-11-17 RX ORDER — POLYETHYLENE GLYCOL 3350 17 G/17G
17 POWDER, FOR SOLUTION ORAL DAILY
Status: DISCONTINUED | OUTPATIENT
Start: 2023-11-17 | End: 2023-11-23 | Stop reason: HOSPADM

## 2023-11-17 RX ORDER — PROMETHAZINE HYDROCHLORIDE 12.5 MG/1
12.5 TABLET ORAL 4 TIMES DAILY PRN
Status: DISCONTINUED | OUTPATIENT
Start: 2023-11-17 | End: 2023-11-23 | Stop reason: HOSPADM

## 2023-11-17 RX ORDER — DICYCLOMINE HYDROCHLORIDE 10 MG/1
10 CAPSULE ORAL 3 TIMES DAILY PRN
COMMUNITY
Start: 2023-11-01

## 2023-11-17 RX ORDER — ATORVASTATIN CALCIUM 10 MG/1
10 TABLET, FILM COATED ORAL NIGHTLY
Status: DISCONTINUED | OUTPATIENT
Start: 2023-11-17 | End: 2023-11-23 | Stop reason: HOSPADM

## 2023-11-17 RX ORDER — GLUCAGON 1 MG
1 KIT INJECTION
Status: DISCONTINUED | OUTPATIENT
Start: 2023-11-17 | End: 2023-11-23 | Stop reason: HOSPADM

## 2023-11-17 RX ORDER — MAGNESIUM SULFATE HEPTAHYDRATE 40 MG/ML
2 INJECTION, SOLUTION INTRAVENOUS ONCE
Status: COMPLETED | OUTPATIENT
Start: 2023-11-17 | End: 2023-11-17

## 2023-11-17 RX ORDER — TALC
6 POWDER (GRAM) TOPICAL NIGHTLY PRN
Status: DISCONTINUED | OUTPATIENT
Start: 2023-11-17 | End: 2023-11-23 | Stop reason: HOSPADM

## 2023-11-17 RX ORDER — LANOLIN ALCOHOL/MO/W.PET/CERES
800 CREAM (GRAM) TOPICAL
Status: DISCONTINUED | OUTPATIENT
Start: 2023-11-17 | End: 2023-11-23 | Stop reason: HOSPADM

## 2023-11-17 RX ORDER — L. ACIDOPHILUS/L.BULGARICUS 100MM CELL
1 GRANULES IN PACKET (EA) ORAL 2 TIMES DAILY
Status: DISCONTINUED | OUTPATIENT
Start: 2023-11-17 | End: 2023-11-23 | Stop reason: HOSPADM

## 2023-11-17 RX ORDER — LEVALBUTEROL INHALATION SOLUTION 1.25 MG/3ML
1.25 SOLUTION RESPIRATORY (INHALATION) EVERY 8 HOURS
Status: DISCONTINUED | OUTPATIENT
Start: 2023-11-17 | End: 2023-11-23 | Stop reason: HOSPADM

## 2023-11-17 RX ORDER — IBUPROFEN 200 MG
24 TABLET ORAL
Status: DISCONTINUED | OUTPATIENT
Start: 2023-11-17 | End: 2023-11-23 | Stop reason: HOSPADM

## 2023-11-17 RX ORDER — LEVALBUTEROL INHALATION SOLUTION 1.25 MG/3ML
1.25 SOLUTION RESPIRATORY (INHALATION)
Status: COMPLETED | OUTPATIENT
Start: 2023-11-17 | End: 2023-11-17

## 2023-11-17 RX ORDER — PROMETHAZINE HYDROCHLORIDE 12.5 MG/1
12.5 TABLET ORAL 4 TIMES DAILY PRN
COMMUNITY
Start: 2023-11-01 | End: 2024-01-09 | Stop reason: DRUGHIGH

## 2023-11-17 RX ORDER — DICYCLOMINE HYDROCHLORIDE 10 MG/1
10 CAPSULE ORAL 3 TIMES DAILY PRN
Status: DISCONTINUED | OUTPATIENT
Start: 2023-11-17 | End: 2023-11-17 | Stop reason: ALTCHOICE

## 2023-11-17 RX ORDER — SERTRALINE HYDROCHLORIDE 50 MG/1
100 TABLET, FILM COATED ORAL DAILY
Status: DISCONTINUED | OUTPATIENT
Start: 2023-11-18 | End: 2023-11-23 | Stop reason: HOSPADM

## 2023-11-17 RX ORDER — ENOXAPARIN SODIUM 100 MG/ML
40 INJECTION SUBCUTANEOUS EVERY 24 HOURS
Status: DISCONTINUED | OUTPATIENT
Start: 2023-11-17 | End: 2023-11-18

## 2023-11-17 RX ORDER — LEVALBUTEROL INHALATION SOLUTION 1.25 MG/3ML
1.25 SOLUTION RESPIRATORY (INHALATION) EVERY 8 HOURS
Status: DISCONTINUED | OUTPATIENT
Start: 2023-11-17 | End: 2023-11-17

## 2023-11-17 RX ORDER — HYDROCODONE BITARTRATE AND ACETAMINOPHEN 5; 325 MG/1; MG/1
1 TABLET ORAL EVERY 6 HOURS PRN
Status: DISCONTINUED | OUTPATIENT
Start: 2023-11-17 | End: 2023-11-23 | Stop reason: HOSPADM

## 2023-11-17 RX ORDER — SODIUM CHLORIDE 9 MG/ML
1000 INJECTION, SOLUTION INTRAVENOUS
Status: COMPLETED | OUTPATIENT
Start: 2023-11-17 | End: 2023-11-17

## 2023-11-17 RX ORDER — ZOLPIDEM TARTRATE 5 MG/1
10 TABLET ORAL NIGHTLY PRN
Status: DISCONTINUED | OUTPATIENT
Start: 2023-11-17 | End: 2023-11-23 | Stop reason: HOSPADM

## 2023-11-17 RX ORDER — ACETAMINOPHEN 325 MG/1
650 TABLET ORAL EVERY 4 HOURS PRN
Status: DISCONTINUED | OUTPATIENT
Start: 2023-11-17 | End: 2023-11-23 | Stop reason: HOSPADM

## 2023-11-17 RX ORDER — METOCLOPRAMIDE HYDROCHLORIDE 5 MG/ML
10 INJECTION INTRAMUSCULAR; INTRAVENOUS
Status: COMPLETED | OUTPATIENT
Start: 2023-11-17 | End: 2023-11-17

## 2023-11-17 RX ORDER — HYDRALAZINE HYDROCHLORIDE 20 MG/ML
10 INJECTION INTRAMUSCULAR; INTRAVENOUS
Status: COMPLETED | OUTPATIENT
Start: 2023-11-17 | End: 2023-11-17

## 2023-11-17 RX ADMIN — VARENICLINE TARTRATE 0.5 MG: 0.5 TABLET, FILM COATED ORAL at 08:11

## 2023-11-17 RX ADMIN — IOHEXOL 100 ML: 350 INJECTION, SOLUTION INTRAVENOUS at 11:11

## 2023-11-17 RX ADMIN — HYDRALAZINE HYDROCHLORIDE 10 MG: 20 INJECTION INTRAMUSCULAR; INTRAVENOUS at 09:11

## 2023-11-17 RX ADMIN — LACTOBACILLUS ACIDOPHILUS / LACTOBACILLUS BULGARICUS 1 EACH: 100 MILLION CFU STRENGTH GRANULES at 08:11

## 2023-11-17 RX ADMIN — ENOXAPARIN SODIUM 40 MG: 40 INJECTION SUBCUTANEOUS at 05:11

## 2023-11-17 RX ADMIN — PANTOPRAZOLE SODIUM 40 MG: 40 TABLET, DELAYED RELEASE ORAL at 08:11

## 2023-11-17 RX ADMIN — HYDROCODONE BITARTRATE AND ACETAMINOPHEN 1 TABLET: 5; 325 TABLET ORAL at 05:11

## 2023-11-17 RX ADMIN — GENTAMICIN SULFATE 91.2 MG: 40 INJECTION, SOLUTION INTRAMUSCULAR; INTRAVENOUS at 10:11

## 2023-11-17 RX ADMIN — SODIUM CHLORIDE: 0.9 INJECTION, SOLUTION INTRAVENOUS at 03:11

## 2023-11-17 RX ADMIN — ACETAMINOPHEN 650 MG: 325 TABLET ORAL at 02:11

## 2023-11-17 RX ADMIN — METOCLOPRAMIDE 10 MG: 5 INJECTION, SOLUTION INTRAMUSCULAR; INTRAVENOUS at 09:11

## 2023-11-17 RX ADMIN — LEVALBUTEROL HYDROCHLORIDE 1.25 MG: 1.25 SOLUTION RESPIRATORY (INHALATION) at 10:11

## 2023-11-17 RX ADMIN — ZOLPIDEM TARTRATE 10 MG: 5 TABLET, COATED ORAL at 08:11

## 2023-11-17 RX ADMIN — BUSPIRONE HYDROCHLORIDE 7.5 MG: 5 TABLET ORAL at 08:11

## 2023-11-17 RX ADMIN — ATORVASTATIN CALCIUM 10 MG: 10 TABLET, FILM COATED ORAL at 08:11

## 2023-11-17 RX ADMIN — SODIUM CHLORIDE 1500 ML: 0.9 INJECTION, SOLUTION INTRAVENOUS at 01:11

## 2023-11-17 RX ADMIN — LEVALBUTEROL HYDROCHLORIDE 1.25 MG: 1.25 SOLUTION RESPIRATORY (INHALATION) at 07:11

## 2023-11-17 RX ADMIN — MAGNESIUM SULFATE HEPTAHYDRATE 2 G: 40 INJECTION, SOLUTION INTRAVENOUS at 11:11

## 2023-11-17 RX ADMIN — SODIUM CHLORIDE 1000 ML: 0.9 INJECTION, SOLUTION INTRAVENOUS at 12:11

## 2023-11-17 RX ADMIN — SODIUM CHLORIDE: 0.9 INJECTION, SOLUTION INTRAVENOUS at 08:11

## 2023-11-17 NOTE — ED PROVIDER NOTES
Encounter Date: 11/17/2023       History     Chief Complaint   Patient presents with    Shortness of Breath     X1 week    Vomiting     Since Monday      Patient presents complaining of shortness a breath, nausea, vomiting, not feeling well for approximately 1 week.  Patient has a history of multiple medical problems.  He denies chest pain or fever.  He denies any new abdominal pain.  Patient has a history of ulcerative colitis.  At the worst symptoms are moderate.      Review of patient's allergies indicates:  No Known Allergies  Past Medical History:   Diagnosis Date    Diabetes mellitus 01/2022    Hyperlipidemia 2015    Hypertension 2015    Insomnia 2015     Past Surgical History:   Procedure Laterality Date    CARDIAC ELECTROPHYSIOLOGY MAPPING AND ABLATION  2017    SVT    CHOLECYSTECTOMY  2011    COLONOSCOPY N/A 5/3/2022    Procedure: COLONOSCOPY;  Surgeon: Shan Jean III, MD;  Location: [facility];  Service: Endoscopy;  Laterality: N/A;    COLONOSCOPY WITH FECAL MICROBIOTA TRANSFER N/A 3/21/2023    Procedure: COLONOSCOPY, WITH FECAL MICROBIOTA TRANSFER;  Surgeon: David Millan MD;  Location: [facility];  Service: Endoscopy;  Laterality: N/A;    ESOPHAGOGASTRODUODENOSCOPY N/A 4/24/2023    Procedure: EGD (ESOPHAGOGASTRODUODENOSCOPY);  Surgeon: Shan Jean III, MD;  Location: [facility];  Service: Endoscopy;  Laterality: N/A;    GANGLION CYST EXCISION Left 1992    UMBILICAL HERNIA REPAIR  2011    with mesh     Family History   Problem Relation Age of Onset    Asthma Mother      Social History     Tobacco Use    Smoking status: Every Day     Current packs/day: 1.00     Average packs/day: 1 pack/day for 30.0 years (30.0 ttl pk-yrs)     Types: Cigarettes    Smokeless tobacco: Never    Tobacco comments:     DO NOT SMOKE DOS   Substance Use Topics    Alcohol use: Yes     Comment: ocass    Drug use: Not Currently     Review of Systems   All other systems reviewed and are negative.      Physical Exam      Initial Vitals [11/17/23 0813]   BP Pulse Resp Temp SpO2   (!) 152/104 (!) 118 19 98.1 °F (36.7 °C) (!) 91 %      MAP       --         Physical Exam    Nursing note and vitals reviewed.  Constitutional: He appears well-developed and well-nourished. He is not diaphoretic. No distress.   Pleasant, polite.  Ill-appearing but in no distress   HENT:   Head: Normocephalic and atraumatic.   Eyes: EOM are normal.   Neck: Neck supple.   Normal range of motion.  Cardiovascular:  Normal rate, regular rhythm, normal heart sounds and intact distal pulses.           Pulmonary/Chest: No respiratory distress.   Abdominal: Abdomen is soft.   Musculoskeletal:      Cervical back: Normal range of motion and neck supple.     Neurological: He is alert.   Skin: Skin is warm and dry.   Psychiatric: He has a normal mood and affect. His behavior is normal. Judgment and thought content normal.         ED Course   Procedures  Labs Reviewed   MAGNESIUM - Abnormal; Notable for the following components:       Result Value    Magnesium 1.3 (*)     All other components within normal limits   CBC W/ AUTO DIFFERENTIAL - Abnormal; Notable for the following components:     (*)     MCH 34.6 (*)     Platelets 145 (*)     Gran # (ANC) 10.3 (*)     Mono # 1.1 (*)     Gran % 82.0 (*)     Lymph % 8.9 (*)     All other components within normal limits   COMPREHENSIVE METABOLIC PANEL - Abnormal; Notable for the following components:    Glucose 111 (*)     All other components within normal limits   URINALYSIS, REFLEX TO URINE CULTURE - Abnormal; Notable for the following components:    Protein, UA 3+ (*)     Ketones, UA 1+ (*)     Occult Blood UA 1+ (*)     All other components within normal limits    Narrative:     Specimen Source->Urine   URINALYSIS MICROSCOPIC - Abnormal; Notable for the following components:    RBC, UA 5 (*)     Hyaline Casts, UA 44 (*)     All other components within normal limits    Narrative:     Specimen Source->Urine    CULTURE, BLOOD   CULTURE, BLOOD   TROPONIN I HIGH SENSITIVITY   B-TYPE NATRIURETIC PEPTIDE   LIPASE   INFLUENZA A AND B ANTIGEN    Narrative:     Specimen Source->Nasopharyngeal Swab   SARS-COV-2 RNA AMPLIFICATION, QUAL   TROPONIN I HIGH SENSITIVITY   LACTIC ACID, PLASMA        ECG Results              EKG 12-lead (In process)  Result time 11/17/23 10:00:16      In process by Interface, Lab In [facility] (11/17/23 10:00:16)                   Narrative:    Test Reason : R06.02,    Vent. Rate : 123 BPM     Atrial Rate : 123 BPM     P-R Int : 130 ms          QRS Dur : 058 ms      QT Int : 336 ms       P-R-T Axes : 056 023 033 degrees     QTc Int : 481 ms    Sinus tachycardia  Possible Left atrial enlargement  Borderline Abnormal ECG  When compared with ECG of 17-NOV-2023 08:22,  No significant change was found    Referred By: [email or web address]   SELF           Confirmed By:                                      EKG 12-lead (In process)  Result time 11/17/23 08:43:23      In process by Interface, Lab In [facility] (11/17/23 08:43:23)                   Narrative:    Test Reason : R07.9,    Vent. Rate : 111 BPM     Atrial Rate : 111 BPM     P-R Int : 126 ms          QRS Dur : 068 ms      QT Int : 324 ms       P-R-T Axes : 056 027 030 degrees     QTc Int : 440 ms    Sinus tachycardia  Possible Left atrial enlargement  Borderline Abnormal ECG  When compared with ECG of 30-MAY-2023 16:17,  T wave amplitude has decreased in Lateral leads    Referred By: [email or web address]   SELF           Confirmed By:                                   Imaging Results              CT Abdomen Pelvis With IV Contrast (Final result)  Result time 11/17/23 12:27:10      Final result by Adrian Lynch MD (11/17/23 12:27:10)                   Narrative:    CMS MANDATED QUALITY DATA - CT RADIATION  436    All CT scans at this facility utilize dose modulation, iterative reconstruction, and/or weight based dosing when appropriate to reduce radiation dose to as low as  reasonably achievable.    CLINICAL HISTORY:  48 years (1975) Male Pulmonary embolism (PE) suspected, high prob    TECHNIQUE:  CT CHEST ANGIOGRAPHY WITH IV CONTRAST, CT ABDOMEN PELVIS WITH IV CONTRAST.  Axial CT examination of the chest with attention to the pulmonary arteries was performed using contiguous axial images from the diaphragms to the lung apices following the intravenous administration of non-ionic contrast material.  Images were reviewed using lung, mediastinal, and bone windows. The study was performed with thin sections with subsequent 3-D reformations, mid and reconstructions at multiple planes with images were stored in the PACS system. Subsequently axial CT of the abdomen and pelvis was obtained.    COMPARISON:  None available.    FINDINGS:  CTA PE evaluation: This is a low quality study for the evaluation of pulmonary embolism secondary to a combination of contrast bolus timing and motion artifact. The pulmonary arteries are normal in appearance without pulmonary emboli noted up to the central main arterial level, noting the limitations of CT technique for identifying small or isolated subsegmental emboli.    CT Chest:  Visualized neck: Within normal limits.  Lungs: Very mild central hilar groundglass attenuation opacity is present. In addition there is ill-defined nodular densities as outlined below:  -6 mm left apical pulmonary nodule (image 83)  -6 mm and adjacent 7 mm pulmonary nodule in the left lower lobe (image 169)  -11 mm soft tissue pulmonary nodule in the left lower lobe (image 205)  -9 mm and adjacent 8mm cavitary pulmonary nodule in the adjacent left lower lobe (image 211)  Airway: The trachea and central bronchial tree appear normal.  Pleura: There is no pleural effusion. There is no pneumothorax.  Cardiovascular: The heart is normal in size. There are no findings of right heart failure. The pulmonary artery is normal in size. There is no pericardial effusion. The thoracic aorta  and great vessels are normal in course and caliber.  Mediastinum: Small lymph nodes are present, for example:  -15 x 10 mm AP window node (image 134)  -21 x 12 mm right precarinal node (image 1:15)  -17 x 10 mm subcarinal node (image 136)  Soft tissues: The peripheral soft tissues appear normal.  Musculoskeletal: The visualized osseous structures appear normal.  There are no suspicious osseous lesions.  Esophagus: The esophagus appears grossly normal.    CT Abdomen:  Liver: Relative diffuse low-attenuation liver consistent with hepatic steatosis. The liver is enlarged measuring 19.7 cm (sagittal right lobe).  Gallbladder: The gallbladder is surgically absent.  Biliary Tree: No intra or extrahepatic ductal dilation.  Spleen: Within normal limits.  Pancreas: There is a moderate-sized periampullary duodenal diverticulum. No ductal dilation, differential enhancement or mass is identified.  Adrenal Glands: The adrenal glands appear within normal limits.  Kidneys: No hydronephrosis/hydroureter or evidence of obstructive uropathy. There is a 4 mm nonobstructing stone in the inferior pole the left kidney and 2 mm nonobstructing stone in the inferior pole the right kidney (image 80 and 83 respectively). There mild faint striations in the renal nephrograms.  Vasculature: The aorta is normal in course and caliber.  Lymph nodes: No abdominal lymphadenopathy is seen.  Intraperitoneal structures: There is no ascites.  Bowel: Scattered sigmoid diverticula are present without focal diverticulitis. There is a rim calcified structure adjacent to the cecum (image 150) containing fat suggesting sequelae of old infection/trauma or a remote torsed epiploic appendage. The bowel is normal in course and caliber with no finding of obstruction, intra-abdominal free air or abscess. The appendix is normal (image 141)  Abdominal wall: The abdominal wall and musculature are normal.  Musculoskeletal: No acute osseous abnormality is  identified.    CT Pelvis:  Bladder: The urinary bladder is within normal limits.  Reproductive Organs: The prostate and seminal vesicles are within normal limits.  Pelvic Lymph nodes: No pelvic lymphadenopathy or pelvic mass is identified.    IMPRESSION:  1. No evidence of pulmonary embolism to the central main arterial level. If there is continued clinical concern, consider further evaluation with lower extremity doppler or repeat imaging.  2. Scattered ill-defined nodular densities in the lungs, most noticeably in the left lower lobe, some of which show central cavitation suspicious for septic emboli and/or necrosis. Consider correlation with the symptoms, history and possible echocardiogram.  3. Faint striations in the renal nephrograms, possibly secondary to phase of contrast or mild pyelonephritis, consider correlation with urinalysis.  4. Small bilateral nonobstructing renal stones.  5. Hepatomegaly background parenchyma findings a steatosis.  6. Prior cholecystectomy with no intra-/extrahepatic biliary ductal dilation.                    .    Electronically signed by:  Adrian Lynch MD  11/17/2023 12:27 PM CST Workstation: [id number]                                     CTA Chest Non-Coronary (PE Studies) (Final result)  Result time 11/17/23 12:27:10      Final result by Adrian Lynch MD (11/17/23 12:27:10)                   Narrative:    CMS MANDATED QUALITY DATA - CT RADIATION  436    All CT scans at this facility utilize dose modulation, iterative reconstruction, and/or weight based dosing when appropriate to reduce radiation dose to as low as reasonably achievable.    CLINICAL HISTORY:  48 years (1975) Male Pulmonary embolism (PE) suspected, high prob    TECHNIQUE:  CT CHEST ANGIOGRAPHY WITH IV CONTRAST, CT ABDOMEN PELVIS WITH IV CONTRAST.  Axial CT examination of the chest with attention to the pulmonary arteries was performed using contiguous axial images from the diaphragms to the lung  apices following the intravenous administration of non-ionic contrast material.  Images were reviewed using lung, mediastinal, and bone windows. The study was performed with thin sections with subsequent 3-D reformations, mid and reconstructions at multiple planes with images were stored in the PACS system. Subsequently axial CT of the abdomen and pelvis was obtained.    COMPARISON:  None available.    FINDINGS:  CTA PE evaluation: This is a low quality study for the evaluation of pulmonary embolism secondary to a combination of contrast bolus timing and motion artifact. The pulmonary arteries are normal in appearance without pulmonary emboli noted up to the central main arterial level, noting the limitations of CT technique for identifying small or isolated subsegmental emboli.    CT Chest:  Visualized neck: Within normal limits.  Lungs: Very mild central hilar groundglass attenuation opacity is present. In addition there is ill-defined nodular densities as outlined below:  -6 mm left apical pulmonary nodule (image 83)  -6 mm and adjacent 7 mm pulmonary nodule in the left lower lobe (image 169)  -11 mm soft tissue pulmonary nodule in the left lower lobe (image 205)  -9 mm and adjacent 8mm cavitary pulmonary nodule in the adjacent left lower lobe (image 211)  Airway: The trachea and central bronchial tree appear normal.  Pleura: There is no pleural effusion. There is no pneumothorax.  Cardiovascular: The heart is normal in size. There are no findings of right heart failure. The pulmonary artery is normal in size. There is no pericardial effusion. The thoracic aorta and great vessels are normal in course and caliber.  Mediastinum: Small lymph nodes are present, for example:  -15 x 10 mm AP window node (image 134)  -21 x 12 mm right precarinal node (image 1:15)  -17 x 10 mm subcarinal node (image 136)  Soft tissues: The peripheral soft tissues appear normal.  Musculoskeletal: The visualized osseous structures appear  normal.  There are no suspicious osseous lesions.  Esophagus: The esophagus appears grossly normal.    CT Abdomen:  Liver: Relative diffuse low-attenuation liver consistent with hepatic steatosis. The liver is enlarged measuring 19.7 cm (sagittal right lobe).  Gallbladder: The gallbladder is surgically absent.  Biliary Tree: No intra or extrahepatic ductal dilation.  Spleen: Within normal limits.  Pancreas: There is a moderate-sized periampullary duodenal diverticulum. No ductal dilation, differential enhancement or mass is identified.  Adrenal Glands: The adrenal glands appear within normal limits.  Kidneys: No hydronephrosis/hydroureter or evidence of obstructive uropathy. There is a 4 mm nonobstructing stone in the inferior pole the left kidney and 2 mm nonobstructing stone in the inferior pole the right kidney (image 80 and 83 respectively). There mild faint striations in the renal nephrograms.  Vasculature: The aorta is normal in course and caliber.  Lymph nodes: No abdominal lymphadenopathy is seen.  Intraperitoneal structures: There is no ascites.  Bowel: Scattered sigmoid diverticula are present without focal diverticulitis. There is a rim calcified structure adjacent to the cecum (image 150) containing fat suggesting sequelae of old infection/trauma or a remote torsed epiploic appendage. The bowel is normal in course and caliber with no finding of obstruction, intra-abdominal free air or abscess. The appendix is normal (image 141)  Abdominal wall: The abdominal wall and musculature are normal.  Musculoskeletal: No acute osseous abnormality is identified.    CT Pelvis:  Bladder: The urinary bladder is within normal limits.  Reproductive Organs: The prostate and seminal vesicles are within normal limits.  Pelvic Lymph nodes: No pelvic lymphadenopathy or pelvic mass is identified.    IMPRESSION:  1. No evidence of pulmonary embolism to the central main arterial level. If there is continued clinical concern,  consider further evaluation with lower extremity doppler or repeat imaging.  2. Scattered ill-defined nodular densities in the lungs, most noticeably in the left lower lobe, some of which show central cavitation suspicious for septic emboli and/or necrosis. Consider correlation with the symptoms, history and possible echocardiogram.  3. Faint striations in the renal nephrograms, possibly secondary to phase of contrast or mild pyelonephritis, consider correlation with urinalysis.  4. Small bilateral nonobstructing renal stones.  5. Hepatomegaly background parenchyma findings a steatosis.  6. Prior cholecystectomy with no intra-/extrahepatic biliary ductal dilation.                    .    Electronically signed by:  Adrian Lynch MD  11/17/2023 12:27 PM CST Workstation: [id number]                                     X-Ray Chest AP Portable (Final result)  Result time 11/17/23 08:47:16      Final result by Rickie Brown MD (11/17/23 08:47:16)                   Narrative:    XR CHEST 1 VIEW    CLINICAL HISTORY:  48 years Male Chest Pain    COMPARISON: April 20, 2023    FINDINGS: Cardiac silhouette size is stable compared to prior. No pulmonary edema, confluent airspace disease, pleural effusion, or pneumothorax. No acute osseous abnormality.    IMPRESSION:    No acute pulmonary pathology.    Electronically signed by:  Rickie Brown MD  11/17/2023 08:47 AM CST Workstation: [id number]                                     Medications   cefepime 2 g in dextrose 5% 100 mL IVPB (ready to mix) (has no administration in time range)   vancomycin - pharmacy to dose (has no administration in time range)   0.9%  NaCl infusion (has no administration in time range)   vancomycin (VANCOCIN) 2,000 mg in dextrose 5 % (D5W) 500 mL IVPB (has no administration in time range)   hydrALAZINE injection 10 mg (10 mg Intravenous Given 11/17/23 0935)   metoclopramide HCl injection 10 mg (10 mg Intravenous Given 11/17/23 0936)    levalbuterol nebulizer solution 1.25 mg (1.25 mg Nebulization Given 11/17/23 1016)   magnesium sulfate 2g in water 50mL IVPB (premix) (0 g Intravenous Stopped 11/17/23 1226)   iohexoL (OMNIPAQUE 350) injection 100 mL (100 mLs Intravenous Given 11/17/23 1142)     Medical Decision Making  Patient was ill-appearing but in no distress     Considerations include but are not limited to infection, pneumonia, pulmonary embolism, small-bowel obstruction, colitis, electrolyte abnormalities, dehydration     MDM    Patient presents for emergent evaluation of acute shortness of breath, weakness, abdominal pain, vomiting that poses a possible threat to life and/or bodily function.    In the ED patient found to have acute concern for septic emboli.   I ordered labs and personally reviewed them.  Labs significant for normal white blood cell count.    I ordered X-rays and personally reviewed them and reviewed the radiologist interpretation.  Xray significant for no acute cardiopulmonary process.    I ordered EKG and personally reviewed it.  EKG significant for regular rate and rhythm without ST elevation.    I ordered CT scan and personally reviewed it and reviewed the radiologist interpretation.  CT significant for concern for possible septic emboli.      Admission MDM  I discussed the patient presentation labs, ekg, X-rays, CT findings with the consultant(s) hospitalist   Patient was managed in the ED with IV hydralazine, DuoNeb, cefepime, vanc.    The response to treatment was unchanged.    Patient required emergent consultation to hospitalist for admission.    Patient with concerning CTA finding we will need further evaluation and treatment in the hospital.  He does not appear critically ill at this time.  His white blood cell count and high sensitivity troponin are normal.  Pending blood cultures and lactate.    Amount and/or Complexity of Data Reviewed  Labs: ordered.  Radiology: ordered.    Risk  Prescription drug  management.  Decision regarding hospitalization.                                   Clinical Impression:  Final diagnoses:  [R07.9] Chest pain  [R06.02] SOB (shortness of breath)          ED Disposition Condition    Admit Stable                Butch Broderick MD  11/17/23 0957

## 2023-11-17 NOTE — ASSESSMENT & PLAN NOTE
CTA of chest shows septic emboli and/or necrosis   Lactic acid 2.1  IV fluids  Trend infection labs   Blood cultures x 2 pending   CXR negative   Flu/covid negative   IV vancomycin and gentamicin

## 2023-11-17 NOTE — CARE UPDATE
11/17/23 1016   Patient Assessment/Suction   Level of Consciousness (AVPU) alert   Respiratory Effort Mild   Expansion/Accessory Muscles/Retractions no use of accessory muscles   All Lung Fields Breath Sounds diminished;equal bilaterally;wheezes, expiratory   Rhythm/Pattern, Respiratory tachypneic;shortness of breath   Cough Frequency infrequent   Cough Type good;productive   Secretions Amount moderate   Secretions Color green;creamy   Secretions Characteristics thick   Skin Integrity   $ Wound Care Tech Time 15 min   Area Observed Left;Right;Behind ear;Cheek;Upper lip;Nares   Skin Appearance without discoloration   PRE-TX-O2   Device (Oxygen Therapy) nasal cannula   $ Is the patient on Low Flow Oxygen? Yes   Flow (L/min) 3   SpO2 95 %   Pulse Oximetry Type Continuous   $ Pulse Oximetry - Multiple Charge Pulse Oximetry - Multiple   Pulse (!) 116   Resp (!) 24   Aerosol Therapy   $ Aerosol Therapy Charges Aerosol Treatment   Daily Review of Necessity (SVN) completed   Respiratory Treatment Status (SVN) given   Treatment Route (SVN) mask;air   Patient Position (SVN) HOB elevated   Post Treatment Assessment (SVN) breath sounds unchanged   Signs of Intolerance (SVN) none   Breath Sounds Post-Respiratory Treatment   Throughout All Fields Post-Treatment All Fields   Throughout All Fields Post-Treatment no change   Post-treatment Heart Rate (beats/min) 115   Post-treatment Resp Rate (breaths/min) 24   Education   $ Education Bronchodilator;15 min   Respiratory Evaluation   $ Care Plan Tech Time 15 min   $ Eval/Re-eval Charges Evaluation   Evaluation For New Orders   Admitting Diagnosis shortness of breath   Home Oxygen   Has Home Oxygen? No

## 2023-11-17 NOTE — ASSESSMENT & PLAN NOTE
Admit for telemetry monitoring   Cardiology consulted   Echo pending   EKG PRN for chest pain   Troponin 7.2, 7.2  Trend cardiac enzymes

## 2023-11-17 NOTE — SUBJECTIVE & OBJECTIVE
Past Medical History:   Diagnosis Date    Diabetes mellitus 01/2022    Hyperlipidemia 2015    Hypertension 2015    Insomnia 2015       Past Surgical History:   Procedure Laterality Date    CARDIAC ELECTROPHYSIOLOGY MAPPING AND ABLATION  2017    SVT    CHOLECYSTECTOMY  2011    COLONOSCOPY N/A 5/3/2022    Procedure: COLONOSCOPY;  Surgeon: Shan Jean III, MD;  Location: [facility] ENDO;  Service: Endoscopy;  Laterality: N/A;    COLONOSCOPY WITH FECAL MICROBIOTA TRANSFER N/A 3/21/2023    Procedure: COLONOSCOPY, WITH FECAL MICROBIOTA TRANSFER;  Surgeon: David Millan MD;  Location: [facility] ENDO;  Service: Endoscopy;  Laterality: N/A;    ESOPHAGOGASTRODUODENOSCOPY N/A 4/24/2023    Procedure: EGD (ESOPHAGOGASTRODUODENOSCOPY);  Surgeon: Shan Jean III, MD;  Location: [facility] ENDO;  Service: Endoscopy;  Laterality: N/A;    GANGLION CYST EXCISION Left 1992    UMBILICAL HERNIA REPAIR  2011    with mesh       Review of patient's allergies indicates:  No Known Allergies    Current Facility-Administered Medications on File Prior to Encounter   Medication    lactated ringers infusion     Current Outpatient Medications on File Prior to Encounter   Medication Sig    busPIRone (BUSPAR) 7.5 MG tablet Take 1 tablet (7.5 mg total) by mouth 3 (three) times daily.    dicyclomine (BENTYL) 10 MG capsule Take 10 mg by mouth 3 (three) times daily as needed.    metFORMIN (GLUCOPHAGE-XR) 500 MG ER 24hr tablet Take 1 tablet (500 mg total) by mouth 2 (two) times daily with meals.    metoprolol succinate (TOPROL-XL) 50 MG 24 hr tablet Take 1 tablet (50 mg total) by mouth once daily.    pantoprazole (PROTONIX) 40 MG tablet Take 1 tablet (40 mg total) by mouth 2 (two) times daily.    promethazine (PHENERGAN) 12.5 MG Tab Take 12.5 mg by mouth 4 (four) times daily as needed.    semaglutide (OZEMPIC) 1 mg/dose (4 mg/3 mL) Inject 1 mg into the skin every 7 days.    sertraline (ZOLOFT) 100 MG tablet Take 1 tablet (100 mg total) by mouth once  daily.    simvastatin (ZOCOR) 20 MG tablet Take 1 tablet (20 mg total) by mouth every evening.    varenicline (CHANTIX STARTING MONTH BOX) 0.5 mg (11)- 1 mg (42) tablet Take one 0.5mg tab by mouth once daily X3 days,then increase to one 0.5mg tab twice daily X4 days,then increase to one 1mg tab twice daily (Patient taking differently: Take 1 tablet by mouth 2 (two) times daily. Take one 0.5mg tab by mouth once daily X3 days,then increase to one 0.5mg tab twice daily X4 days,then increase to one 1mg tab twice daily)    zolpidem (AMBIEN) 10 mg Tab Take 1 tablet (10 mg total) by mouth nightly as needed (insomnia).    [DISCONTINUED] budesonide (ENTOCORT EC) 3 mg capsule Take 9 mg by mouth once daily.    [DISCONTINUED] potassium chloride SA (K-DUR,KLOR-CON) 20 MEQ tablet Take 1 tablet (20 mEq total) by mouth once daily.    [DISCONTINUED] promethazine (PHENERGAN) 25 MG tablet Take 25-50 mg by mouth every 8 (eight) hours as needed.     Family History       Problem Relation (Age of Onset)    Asthma Mother          Tobacco Use    Smoking status: Every Day     Current packs/day: 1.00     Average packs/day: 1 pack/day for 30.0 years (30.0 ttl pk-yrs)     Types: Cigarettes    Smokeless tobacco: Never    Tobacco comments:     DO NOT SMOKE DOS   Substance and Sexual Activity    Alcohol use: Yes     Comment: ocass    Drug use: Not Currently    Sexual activity: Not Currently     Review of Systems   Constitutional:  Positive for appetite change and fatigue. Negative for chills, fever and unexpected weight change.   HENT: Negative.  Negative for congestion, ear pain, hearing loss, rhinorrhea, sore throat and tinnitus.    Eyes: Negative.  Negative for pain, discharge and redness.   Respiratory:  Positive for shortness of breath. Negative for cough, wheezing and stridor.    Cardiovascular:  Positive for chest pain. Negative for palpitations and leg swelling.   Gastrointestinal:  Positive for abdominal pain, nausea and vomiting.  Negative for abdominal distention, constipation and diarrhea.   Endocrine: Negative.  Negative for cold intolerance, heat intolerance, polydipsia, polyphagia and polyuria.   Genitourinary: Negative.  Negative for decreased urine volume, difficulty urinating, flank pain, hematuria and urgency.   Musculoskeletal: Negative.  Negative for arthralgias, gait problem and myalgias.   Skin: Negative.  Negative for pallor and rash.   Allergic/Immunologic: Negative.  Negative for environmental allergies, food allergies and immunocompromised state.   Neurological:  Positive for headaches. Negative for dizziness, syncope, facial asymmetry and weakness.   Hematological: Negative.    Psychiatric/Behavioral: Negative.  Negative for agitation, confusion, self-injury and suicidal ideas.      Objective:     Vital Signs (Most Recent):  Temp: 98.1 °F (36.7 °C) (11/17/23 0813)  Pulse: (!) 116 (11/17/23 1016)  Resp: (!) 24 (11/17/23 1016)  BP: (!) 170/86 (11/17/23 1008)  SpO2: 95 % (11/17/23 1016) Vital Signs (24h Range):  Temp:  [98.1 °F (36.7 °C)] 98.1 °F (36.7 °C)  Pulse:  [102-123] 116  Resp:  [19-29] 24  SpO2:  [91 %-96 %] 95 %  BP: (145-180)/() 170/86     Weight: 136.1 kg (300 lb)  Body mass index is 49.92 kg/m².     Physical Exam  Vitals reviewed.   Constitutional:       General: He is not in acute distress.     Appearance: Normal appearance. He is normal weight. He is ill-appearing. He is not toxic-appearing.   HENT:      Head: Normocephalic and atraumatic.      Nose: Nose normal. No congestion or rhinorrhea.      Mouth/Throat:      Mouth: Mucous membranes are moist.      Pharynx: Oropharynx is clear.   Eyes:      General:         Right eye: No discharge.         Left eye: No discharge.      Extraocular Movements: Extraocular movements intact.      Conjunctiva/sclera: Conjunctivae normal.      Pupils: Pupils are equal, round, and reactive to light.   Cardiovascular:      Rate and Rhythm: Normal rate and regular rhythm.       Pulses: Normal pulses.      Heart sounds: Normal heart sounds.   Pulmonary:      Effort: Pulmonary effort is normal.      Breath sounds: Normal breath sounds.      Comments: Requiring 2L O2 per NC  Abdominal:      General: Abdomen is flat. Bowel sounds are normal. There is no distension.      Palpations: Abdomen is soft.      Tenderness: There is no abdominal tenderness. There is no guarding.   Musculoskeletal:         General: Normal range of motion.      Cervical back: Normal range of motion and neck supple.   Skin:     General: Skin is warm and dry.      Findings: No rash.   Neurological:      General: No focal deficit present.      Mental Status: He is alert and oriented to person, place, and time. Mental status is at baseline.      Motor: No weakness.   Psychiatric:         Mood and Affect: Mood normal.            CRANIAL NERVES     CN III, IV, VI   Pupils are equal, round, and reactive to light.       Significant Labs: All pertinent labs within the past 24 hours have been reviewed.  CBC:   Recent Labs   Lab 11/17/23  0931   WBC 12.53   HGB 16.1   HCT 46.4   *     CMP:   Recent Labs   Lab 11/17/23  0931      K 4.1      CO2 26   *   BUN 11   CREATININE 1.1   CALCIUM 9.4   PROT 8.4   ALBUMIN 4.4   BILITOT 0.6   ALKPHOS 70   AST 25   ALT 27   ANIONGAP 8     Lipase:   Recent Labs   Lab 11/17/23  0931   LIPASE 8     Magnesium:   Recent Labs   Lab 11/17/23  0931   MG 1.3*     Troponin:   Recent Labs   Lab 11/17/23  0931 11/17/23  1126   TROPONINIHS 7.2 7.2     TSH:   Recent Labs   Lab 05/24/23  1117   TSH 0.550     Urine Studies:   Recent Labs   Lab 11/17/23  0952   COLORU Yellow   APPEARANCEUA Clear   PHUR 6.0   SPECGRAV 1.030   PROTEINUA 3+*   GLUCUA Negative   KETONESU 1+*   BILIRUBINUA Negative   OCCULTUA 1+*   NITRITE Negative   UROBILINOGEN Negative   LEUKOCYTESUR Negative   RBCUA 5*   WBCUA 3   BACTERIA Negative   SQUAMEPITHEL 3   HYALINECASTS 44*       Significant Imaging: I have  reviewed all pertinent imaging results/findings within the past 24 hours.

## 2023-11-17 NOTE — HPI
Pt is a 48 year old male with a PMH of DM2, anemia, tobacco use, HLD, and anxiety. Pt presents to ED today after complaints of SOB, chest pain, nausea/vomiting, and abdominal pain x1 week. Pt states he has had progressively worsening symptoms and unable to find relief on own over the last week. Pt has history of colitis, CT of abdomen shows potential mild pyelonephritis. Pt does not have CVA tenderness on exam, creatinine 1.1, and UA not diagnostic. Lipase 8, negative flu/covid, and WBC 12.53. CTA of chest shows no evidence of PE but does have L lower lobe suspicion for septic emboli and/or necrosis. Pt has SOB and requires 2L O2 to keep >90%. Pt given nebulizer treatment in ED and has improvement with SOB. Pt on exam states he is feeling better and most symptoms have diminished including chest pain, SOB, nausea, and abdominal pain. Pt started on antibiotics in ED but pt refused due to hx of cdiff and GI giving him antibiotic free window to abide by. Discussed risk vs benefits with new onset sepsis. Cardiology consulted and echo pending. Pt states he stopped smoking approximately 1 week ago, denies alcohol use, and illicit drug use. Full code.

## 2023-11-17 NOTE — H&P
[facility] - Emergency Dept  Hospital Medicine  History & Physical    Patient Name: Jacoby Jean-Baptiste  MRN: [id number]  Patient Class: IP- Inpatient  Admission Date: 11/17/2023  Attending Physician: Juan Antonio Brown DO   Primary Care Provider: Hunter Aguilar III, MD         Patient information was obtained from patient and ER records.     Subjective:     Principal Problem:Septic embolism    Chief Complaint:   Chief Complaint   Patient presents with    Shortness of Breath     X1 week    Vomiting     Since Monday         HPI: Pt is a 48 year old male with a PMH of DM2, anemia, tobacco use, HLD, and anxiety. Pt presents to ED today after complaints of SOB, chest pain, nausea/vomiting, and abdominal pain x1 week. Pt states he has had progressively worsening symptoms and unable to find relief on own over the last week. Pt has history of colitis, CT of abdomen shows potential mild pyelonephritis. Pt does not have CVA tenderness on exam, creatinine 1.1, and UA not diagnostic. Lipase 8, negative flu/covid, and WBC 12.53. CTA of chest shows no evidence of PE but does have L lower lobe suspicion for septic emboli and/or necrosis. Pt has SOB and requires 2L O2 to keep >90%. Pt given nebulizer treatment in ED and has improvement with SOB. Pt on exam states he is feeling better and most symptoms have diminished including chest pain, SOB, nausea, and abdominal pain. Pt started on antibiotics in ED but pt refused due to hx of cdiff and GI giving him antibiotic free window to abide by. Discussed risk vs benefits with new onset sepsis. Cardiology consulted and echo pending. Pt states he stopped smoking approximately 1 week ago, denies alcohol use, and illicit drug use. Full code.     Past Medical History:   Diagnosis Date    Diabetes mellitus 01/2022    Hyperlipidemia 2015    Hypertension 2015    Insomnia 2015       Past Surgical History:   Procedure Laterality Date    CARDIAC ELECTROPHYSIOLOGY MAPPING AND ABLATION   2017    SVT    CHOLECYSTECTOMY  2011    COLONOSCOPY N/A 5/3/2022    Procedure: COLONOSCOPY;  Surgeon: Shan Jean III, MD;  Location: [facility] ENDO;  Service: Endoscopy;  Laterality: N/A;    COLONOSCOPY WITH FECAL MICROBIOTA TRANSFER N/A 3/21/2023    Procedure: COLONOSCOPY, WITH FECAL MICROBIOTA TRANSFER;  Surgeon: David Millan MD;  Location: [facility] ENDO;  Service: Endoscopy;  Laterality: N/A;    ESOPHAGOGASTRODUODENOSCOPY N/A 4/24/2023    Procedure: EGD (ESOPHAGOGASTRODUODENOSCOPY);  Surgeon: Shan Jean III, MD;  Location: [facility] ENDO;  Service: Endoscopy;  Laterality: N/A;    GANGLION CYST EXCISION Left 1992    UMBILICAL HERNIA REPAIR  2011    with mesh       Review of patient's allergies indicates:  No Known Allergies    Current Facility-Administered Medications on File Prior to Encounter   Medication    lactated ringers infusion     Current Outpatient Medications on File Prior to Encounter   Medication Sig    busPIRone (BUSPAR) 7.5 MG tablet Take 1 tablet (7.5 mg total) by mouth 3 (three) times daily.    dicyclomine (BENTYL) 10 MG capsule Take 10 mg by mouth 3 (three) times daily as needed.    metFORMIN (GLUCOPHAGE-XR) 500 MG ER 24hr tablet Take 1 tablet (500 mg total) by mouth 2 (two) times daily with meals.    metoprolol succinate (TOPROL-XL) 50 MG 24 hr tablet Take 1 tablet (50 mg total) by mouth once daily.    pantoprazole (PROTONIX) 40 MG tablet Take 1 tablet (40 mg total) by mouth 2 (two) times daily.    promethazine (PHENERGAN) 12.5 MG Tab Take 12.5 mg by mouth 4 (four) times daily as needed.    semaglutide (OZEMPIC) 1 mg/dose (4 mg/3 mL) Inject 1 mg into the skin every 7 days.    sertraline (ZOLOFT) 100 MG tablet Take 1 tablet (100 mg total) by mouth once daily.    simvastatin (ZOCOR) 20 MG tablet Take 1 tablet (20 mg total) by mouth every evening.    varenicline (CHANTIX STARTING MONTH BOX) 0.5 mg (11)- 1 mg (42) tablet Take one 0.5mg tab by mouth once daily X3 days,then increase to one  0.5mg tab twice daily X4 days,then increase to one 1mg tab twice daily (Patient taking differently: Take 1 tablet by mouth 2 (two) times daily. Take one 0.5mg tab by mouth once daily X3 days,then increase to one 0.5mg tab twice daily X4 days,then increase to one 1mg tab twice daily)    zolpidem (AMBIEN) 10 mg Tab Take 1 tablet (10 mg total) by mouth nightly as needed (insomnia).    [DISCONTINUED] budesonide (ENTOCORT EC) 3 mg capsule Take 9 mg by mouth once daily.    [DISCONTINUED] potassium chloride SA (K-DUR,KLOR-CON) 20 MEQ tablet Take 1 tablet (20 mEq total) by mouth once daily.    [DISCONTINUED] promethazine (PHENERGAN) 25 MG tablet Take 25-50 mg by mouth every 8 (eight) hours as needed.     Family History       Problem Relation (Age of Onset)    Asthma Mother          Tobacco Use    Smoking status: Every Day     Current packs/day: 1.00     Average packs/day: 1 pack/day for 30.0 years (30.0 ttl pk-yrs)     Types: Cigarettes    Smokeless tobacco: Never    Tobacco comments:     DO NOT SMOKE DOS   Substance and Sexual Activity    Alcohol use: Yes     Comment: ocass    Drug use: Not Currently    Sexual activity: Not Currently     Review of Systems   Constitutional:  Positive for appetite change and fatigue. Negative for chills, fever and unexpected weight change.   HENT: Negative.  Negative for congestion, ear pain, hearing loss, rhinorrhea, sore throat and tinnitus.    Eyes: Negative.  Negative for pain, discharge and redness.   Respiratory:  Positive for shortness of breath. Negative for cough, wheezing and stridor.    Cardiovascular:  Positive for chest pain. Negative for palpitations and leg swelling.   Gastrointestinal:  Positive for abdominal pain, nausea and vomiting. Negative for abdominal distention, constipation and diarrhea.   Endocrine: Negative.  Negative for cold intolerance, heat intolerance, polydipsia, polyphagia and polyuria.   Genitourinary: Negative.  Negative for decreased urine volume,  difficulty urinating, flank pain, hematuria and urgency.   Musculoskeletal: Negative.  Negative for arthralgias, gait problem and myalgias.   Skin: Negative.  Negative for pallor and rash.   Allergic/Immunologic: Negative.  Negative for environmental allergies, food allergies and immunocompromised state.   Neurological:  Positive for headaches. Negative for dizziness, syncope, facial asymmetry and weakness.   Hematological: Negative.    Psychiatric/Behavioral: Negative.  Negative for agitation, confusion, self-injury and suicidal ideas.      Objective:     Vital Signs (Most Recent):  Temp: 98.1 °F (36.7 °C) (11/17/23 0813)  Pulse: (!) 116 (11/17/23 1016)  Resp: (!) 24 (11/17/23 1016)  BP: (!) 170/86 (11/17/23 1008)  SpO2: 95 % (11/17/23 1016) Vital Signs (24h Range):  Temp:  [98.1 °F (36.7 °C)] 98.1 °F (36.7 °C)  Pulse:  [102-123] 116  Resp:  [19-29] 24  SpO2:  [91 %-96 %] 95 %  BP: (145-180)/() 170/86     Weight: 136.1 kg (300 lb)  Body mass index is 49.92 kg/m².     Physical Exam  Vitals reviewed.   Constitutional:       General: He is not in acute distress.     Appearance: Normal appearance. He is normal weight. He is ill-appearing. He is not toxic-appearing.   HENT:      Head: Normocephalic and atraumatic.      Nose: Nose normal. No congestion or rhinorrhea.      Mouth/Throat:      Mouth: Mucous membranes are moist.      Pharynx: Oropharynx is clear.   Eyes:      General:         Right eye: No discharge.         Left eye: No discharge.      Extraocular Movements: Extraocular movements intact.      Conjunctiva/sclera: Conjunctivae normal.      Pupils: Pupils are equal, round, and reactive to light.   Cardiovascular:      Rate and Rhythm: Normal rate and regular rhythm.      Pulses: Normal pulses.      Heart sounds: Normal heart sounds.   Pulmonary:      Effort: Pulmonary effort is normal.      Breath sounds: Normal breath sounds.      Comments: Requiring 2L O2 per NC  Abdominal:      General: Abdomen is  flat. Bowel sounds are normal. There is no distension.      Palpations: Abdomen is soft.      Tenderness: There is no abdominal tenderness. There is no guarding.   Musculoskeletal:         General: Normal range of motion.      Cervical back: Normal range of motion and neck supple.   Skin:     General: Skin is warm and dry.      Findings: No rash.   Neurological:      General: No focal deficit present.      Mental Status: He is alert and oriented to person, place, and time. Mental status is at baseline.      Motor: No weakness.   Psychiatric:         Mood and Affect: Mood normal.            CRANIAL NERVES     CN III, IV, VI   Pupils are equal, round, and reactive to light.       Significant Labs: All pertinent labs within the past 24 hours have been reviewed.  CBC:   Recent Labs   Lab 11/17/23  0931   WBC 12.53   HGB 16.1   HCT 46.4   *     CMP:   Recent Labs   Lab 11/17/23  0931      K 4.1      CO2 26   *   BUN 11   CREATININE 1.1   CALCIUM 9.4   PROT 8.4   ALBUMIN 4.4   BILITOT 0.6   ALKPHOS 70   AST 25   ALT 27   ANIONGAP 8     Lipase:   Recent Labs   Lab 11/17/23  0931   LIPASE 8     Magnesium:   Recent Labs   Lab 11/17/23  0931   MG 1.3*     Troponin:   Recent Labs   Lab 11/17/23  0931 11/17/23  1126   TROPONINIHS 7.2 7.2     TSH:   Recent Labs   Lab 05/24/23  1117   TSH 0.550     Urine Studies:   Recent Labs   Lab 11/17/23  0952   COLORU Yellow   APPEARANCEUA Clear   PHUR 6.0   SPECGRAV 1.030   PROTEINUA 3+*   GLUCUA Negative   KETONESU 1+*   BILIRUBINUA Negative   OCCULTUA 1+*   NITRITE Negative   UROBILINOGEN Negative   LEUKOCYTESUR Negative   RBCUA 5*   WBCUA 3   BACTERIA Negative   SQUAMEPITHEL 3   HYALINECASTS 44*       Significant Imaging: I have reviewed all pertinent imaging results/findings within the past 24 hours.  Assessment/Plan:     * Septic embolism  CTA of chest shows septic emboli and/or necrosis   Lactic acid 2.1  IV fluids  Trend infection labs   Blood cultures x 2  pending   CXR negative   Flu/covid negative   IV vancomycin and gentamicin     Chest pain  Admit for telemetry monitoring   Cardiology consulted   Echo pending   EKG PRN for chest pain   Troponin 7.2, 7.2  Trend cardiac enzymes     Shortness of breath  O2 PRN to keep O2 >90%  Nebulizer treatments   Continuous pulse oximetry       Hypomagnesemia  Magnesium 1.3  Trend electrolytes and replete PRN       Anxiety  Continue home regimen     Type 2 diabetes mellitus without complication, without long-term current use of insulin  POCT glucose   Insulin sliding scale     Hyperlipidemia   Continue statin       Tobacco use  Pt states he quit smoking last Saturday   Denies nicotine patch         VTE Risk Mitigation (From admission, onward)           Ordered     enoxaparin injection 40 mg  Daily         11/17/23 1308     IP VTE HIGH RISK PATIENT  Once         11/17/23 1308     Place sequential compression device  Until discontinued         11/17/23 1308                      Discussed and reviewed patient case with supervising physician who agrees with current plan of care.     REENA Tinajero  Department of Hospital Medicine  [facility] - Emergency Dept

## 2023-11-17 NOTE — ED NOTES
Patient denies second attempt for IV, states was stuck multiple times by lab for blood cultures and wants only one IV at this time. Provider aware.

## 2023-11-18 LAB
ALBUMIN SERPL BCP-MCNC: 3.5 G/DL (ref 3.5–5.2)
ALP SERPL-CCNC: 57 U/L (ref 55–135)
ALT SERPL W/O P-5'-P-CCNC: 19 U/L (ref 10–44)
AMPHET+METHAMPHET UR QL: NEGATIVE
ANION GAP SERPL CALC-SCNC: 5 MMOL/L (ref 8–16)
ANION GAP SERPL CALC-SCNC: 7 MMOL/L (ref 8–16)
AST SERPL-CCNC: 18 U/L (ref 10–40)
BARBITURATES UR QL SCN>200 NG/ML: NEGATIVE
BASOPHILS # BLD AUTO: 0.02 K/UL (ref 0–0.2)
BASOPHILS # BLD AUTO: 0.04 K/UL (ref 0–0.2)
BASOPHILS NFR BLD: 0.1 % (ref 0–1.9)
BASOPHILS NFR BLD: 0.3 % (ref 0–1.9)
BENZODIAZ UR QL SCN>200 NG/ML: NEGATIVE
BILIRUB SERPL-MCNC: 0.5 MG/DL (ref 0.1–1)
BUN SERPL-MCNC: 10 MG/DL (ref 6–20)
BUN SERPL-MCNC: 12 MG/DL (ref 6–20)
BZE UR QL SCN: NEGATIVE
CALCIUM SERPL-MCNC: 8.4 MG/DL (ref 8.7–10.5)
CALCIUM SERPL-MCNC: 8.4 MG/DL (ref 8.7–10.5)
CANNABINOIDS UR QL SCN: ABNORMAL
CHLORIDE SERPL-SCNC: 105 MMOL/L (ref 95–110)
CHLORIDE SERPL-SCNC: 107 MMOL/L (ref 95–110)
CO2 SERPL-SCNC: 23 MMOL/L (ref 23–29)
CO2 SERPL-SCNC: 26 MMOL/L (ref 23–29)
CREAT SERPL-MCNC: 0.9 MG/DL (ref 0.5–1.4)
CREAT SERPL-MCNC: 1 MG/DL (ref 0.5–1.4)
CREAT UR-MCNC: 227.5 MG/DL (ref 23–375)
DIFFERENTIAL METHOD: ABNORMAL
DIFFERENTIAL METHOD: ABNORMAL
EOSINOPHIL # BLD AUTO: 0.1 K/UL (ref 0–0.5)
EOSINOPHIL # BLD AUTO: 0.4 K/UL (ref 0–0.5)
EOSINOPHIL NFR BLD: 0.8 % (ref 0–8)
EOSINOPHIL NFR BLD: 2.4 % (ref 0–8)
ERYTHROCYTE [DISTWIDTH] IN BLOOD BY AUTOMATED COUNT: 13.8 % (ref 11.5–14.5)
ERYTHROCYTE [DISTWIDTH] IN BLOOD BY AUTOMATED COUNT: 13.9 % (ref 11.5–14.5)
EST. GFR  (NO RACE VARIABLE): >60 ML/MIN/1.73 M^2
EST. GFR  (NO RACE VARIABLE): >60 ML/MIN/1.73 M^2
GENTAMICIN PEAK SERPL-MCNC: 4.3 UG/ML
GENTAMICIN TROUGH SERPL-MCNC: 1.2 UG/ML
GLUCOSE SERPL-MCNC: 114 MG/DL (ref 70–110)
GLUCOSE SERPL-MCNC: 115 MG/DL (ref 70–110)
GLUCOSE SERPL-MCNC: 88 MG/DL (ref 70–110)
HCT VFR BLD AUTO: 40.5 % (ref 40–54)
HCT VFR BLD AUTO: 41.7 % (ref 40–54)
HGB BLD-MCNC: 13.4 G/DL (ref 14–18)
HGB BLD-MCNC: 14 G/DL (ref 14–18)
IMM GRANULOCYTES # BLD AUTO: 0.07 K/UL (ref 0–0.04)
IMM GRANULOCYTES # BLD AUTO: 0.11 K/UL (ref 0–0.04)
IMM GRANULOCYTES NFR BLD AUTO: 0.5 % (ref 0–0.5)
IMM GRANULOCYTES NFR BLD AUTO: 0.7 % (ref 0–0.5)
LYMPHOCYTES # BLD AUTO: 1.3 K/UL (ref 1–4.8)
LYMPHOCYTES # BLD AUTO: 1.4 K/UL (ref 1–4.8)
LYMPHOCYTES NFR BLD: 10.5 % (ref 18–48)
LYMPHOCYTES NFR BLD: 8.9 % (ref 18–48)
MAGNESIUM SERPL-MCNC: 1.5 MG/DL (ref 1.6–2.6)
MAGNESIUM SERPL-MCNC: 1.5 MG/DL (ref 1.6–2.6)
MCH RBC QN AUTO: 33.4 PG (ref 27–31)
MCH RBC QN AUTO: 34.1 PG (ref 27–31)
MCHC RBC AUTO-ENTMCNC: 33.1 G/DL (ref 32–36)
MCHC RBC AUTO-ENTMCNC: 33.6 G/DL (ref 32–36)
MCV RBC AUTO: 101 FL (ref 82–98)
MCV RBC AUTO: 102 FL (ref 82–98)
MONOCYTES # BLD AUTO: 1.3 K/UL (ref 0.3–1)
MONOCYTES # BLD AUTO: 1.5 K/UL (ref 0.3–1)
MONOCYTES NFR BLD: 10.3 % (ref 4–15)
MONOCYTES NFR BLD: 9.9 % (ref 4–15)
NEUTROPHILS # BLD AUTO: 10 K/UL (ref 1.8–7.7)
NEUTROPHILS # BLD AUTO: 11.5 K/UL (ref 1.8–7.7)
NEUTROPHILS NFR BLD: 77.6 % (ref 38–73)
NEUTROPHILS NFR BLD: 78 % (ref 38–73)
NRBC BLD-RTO: 0 /100 WBC
NRBC BLD-RTO: 0 /100 WBC
OPIATES UR QL SCN: ABNORMAL
PCP UR QL SCN>25 NG/ML: NEGATIVE
PHOSPHATE SERPL-MCNC: 1.9 MG/DL (ref 2.7–4.5)
PLATELET # BLD AUTO: 160 K/UL (ref 150–450)
PLATELET # BLD AUTO: 163 K/UL (ref 150–450)
PMV BLD AUTO: 10.8 FL (ref 9.2–12.9)
PMV BLD AUTO: 11.4 FL (ref 9.2–12.9)
POTASSIUM SERPL-SCNC: 4.2 MMOL/L (ref 3.5–5.1)
POTASSIUM SERPL-SCNC: 4.4 MMOL/L (ref 3.5–5.1)
PROT SERPL-MCNC: 6.8 G/DL (ref 6–8.4)
RBC # BLD AUTO: 4.01 M/UL (ref 4.6–6.2)
RBC # BLD AUTO: 4.1 M/UL (ref 4.6–6.2)
SODIUM SERPL-SCNC: 136 MMOL/L (ref 136–145)
SODIUM SERPL-SCNC: 137 MMOL/L (ref 136–145)
TOXICOLOGY INFORMATION: ABNORMAL
WBC # BLD AUTO: 12.8 K/UL (ref 3.9–12.7)
WBC # BLD AUTO: 14.8 K/UL (ref 3.9–12.7)

## 2023-11-18 PROCEDURE — 63600175 PHARM REV CODE 636 W HCPCS: Performed by: HOSPITALIST

## 2023-11-18 PROCEDURE — 99900031 HC PATIENT EDUCATION (STAT)

## 2023-11-18 PROCEDURE — 87040 BLOOD CULTURE FOR BACTERIA: CPT | Performed by: HOSPITALIST

## 2023-11-18 PROCEDURE — 25000242 PHARM REV CODE 250 ALT 637 W/ HCPCS: Performed by: INTERNAL MEDICINE

## 2023-11-18 PROCEDURE — 99223 PR INITIAL HOSPITAL CARE,LEVL III: ICD-10-PCS | Mod: ,,, | Performed by: INTERNAL MEDICINE

## 2023-11-18 PROCEDURE — 80170 ASSAY OF GENTAMICIN: CPT | Mod: 91 | Performed by: INTERNAL MEDICINE

## 2023-11-18 PROCEDURE — 36415 COLL VENOUS BLD VENIPUNCTURE: CPT | Performed by: HOSPITALIST

## 2023-11-18 PROCEDURE — 94640 AIRWAY INHALATION TREATMENT: CPT

## 2023-11-18 PROCEDURE — 36415 COLL VENOUS BLD VENIPUNCTURE: CPT | Performed by: NURSE PRACTITIONER

## 2023-11-18 PROCEDURE — 94761 N-INVAS EAR/PLS OXIMETRY MLT: CPT

## 2023-11-18 PROCEDURE — 83735 ASSAY OF MAGNESIUM: CPT | Mod: 91 | Performed by: NURSE PRACTITIONER

## 2023-11-18 PROCEDURE — 99223 1ST HOSP IP/OBS HIGH 75: CPT | Mod: ,,, | Performed by: INTERNAL MEDICINE

## 2023-11-18 PROCEDURE — 80048 BASIC METABOLIC PNL TOTAL CA: CPT | Performed by: NURSE PRACTITIONER

## 2023-11-18 PROCEDURE — 36415 COLL VENOUS BLD VENIPUNCTURE: CPT | Performed by: PHYSICAL THERAPY ASSISTANT

## 2023-11-18 PROCEDURE — 85025 COMPLETE CBC W/AUTO DIFF WBC: CPT | Performed by: PHYSICAL THERAPY ASSISTANT

## 2023-11-18 PROCEDURE — 21400001 HC TELEMETRY ROOM

## 2023-11-18 PROCEDURE — 80170 ASSAY OF GENTAMICIN: CPT | Performed by: INTERNAL MEDICINE

## 2023-11-18 PROCEDURE — 25000003 PHARM REV CODE 250: Performed by: INTERNAL MEDICINE

## 2023-11-18 PROCEDURE — 25000003 PHARM REV CODE 250: Performed by: PHYSICAL THERAPY ASSISTANT

## 2023-11-18 PROCEDURE — 84100 ASSAY OF PHOSPHORUS: CPT | Performed by: PHYSICAL THERAPY ASSISTANT

## 2023-11-18 PROCEDURE — 83735 ASSAY OF MAGNESIUM: CPT | Performed by: PHYSICAL THERAPY ASSISTANT

## 2023-11-18 PROCEDURE — 25000003 PHARM REV CODE 250: Performed by: STUDENT IN AN ORGANIZED HEALTH CARE EDUCATION/TRAINING PROGRAM

## 2023-11-18 PROCEDURE — 99900035 HC TECH TIME PER 15 MIN (STAT)

## 2023-11-18 PROCEDURE — 80053 COMPREHEN METABOLIC PANEL: CPT | Performed by: PHYSICAL THERAPY ASSISTANT

## 2023-11-18 PROCEDURE — 36415 COLL VENOUS BLD VENIPUNCTURE: CPT | Performed by: INTERNAL MEDICINE

## 2023-11-18 PROCEDURE — 85025 COMPLETE CBC W/AUTO DIFF WBC: CPT | Mod: 91 | Performed by: NURSE PRACTITIONER

## 2023-11-18 PROCEDURE — 63600175 PHARM REV CODE 636 W HCPCS: Performed by: INTERNAL MEDICINE

## 2023-11-18 RX ORDER — SODIUM CHLORIDE 9 MG/ML
INJECTION, SOLUTION INTRAVENOUS CONTINUOUS
Status: DISCONTINUED | OUTPATIENT
Start: 2023-11-18 | End: 2023-11-23

## 2023-11-18 RX ORDER — ENOXAPARIN SODIUM 100 MG/ML
40 INJECTION SUBCUTANEOUS EVERY 12 HOURS
Status: DISCONTINUED | OUTPATIENT
Start: 2023-11-18 | End: 2023-11-23 | Stop reason: HOSPADM

## 2023-11-18 RX ORDER — BENZONATATE 100 MG/1
100 CAPSULE ORAL 3 TIMES DAILY PRN
Status: DISCONTINUED | OUTPATIENT
Start: 2023-11-18 | End: 2023-11-23 | Stop reason: HOSPADM

## 2023-11-18 RX ADMIN — LACTOBACILLUS ACIDOPHILUS / LACTOBACILLUS BULGARICUS 1 EACH: 100 MILLION CFU STRENGTH GRANULES at 10:11

## 2023-11-18 RX ADMIN — ENOXAPARIN SODIUM 40 MG: 40 INJECTION SUBCUTANEOUS at 08:11

## 2023-11-18 RX ADMIN — BUSPIRONE HYDROCHLORIDE 7.5 MG: 5 TABLET ORAL at 10:11

## 2023-11-18 RX ADMIN — LEVALBUTEROL HYDROCHLORIDE 1.25 MG: 1.25 SOLUTION RESPIRATORY (INHALATION) at 08:11

## 2023-11-18 RX ADMIN — PANTOPRAZOLE SODIUM 40 MG: 40 TABLET, DELAYED RELEASE ORAL at 10:11

## 2023-11-18 RX ADMIN — PANTOPRAZOLE SODIUM 40 MG: 40 TABLET, DELAYED RELEASE ORAL at 08:11

## 2023-11-18 RX ADMIN — METOPROLOL SUCCINATE 50 MG: 50 TABLET, EXTENDED RELEASE ORAL at 10:11

## 2023-11-18 RX ADMIN — BENZONATATE 100 MG: 100 CAPSULE ORAL at 08:11

## 2023-11-18 RX ADMIN — BUSPIRONE HYDROCHLORIDE 7.5 MG: 5 TABLET ORAL at 08:11

## 2023-11-18 RX ADMIN — VANCOMYCIN HYDROCHLORIDE 2000 MG: 5 INJECTION, POWDER, LYOPHILIZED, FOR SOLUTION INTRAVENOUS at 02:11

## 2023-11-18 RX ADMIN — VARENICLINE TARTRATE 0.5 MG: 0.5 TABLET, FILM COATED ORAL at 08:11

## 2023-11-18 RX ADMIN — SODIUM CHLORIDE: 0.9 INJECTION, SOLUTION INTRAVENOUS at 10:11

## 2023-11-18 RX ADMIN — HYDROCODONE BITARTRATE AND ACETAMINOPHEN 1 TABLET: 5; 325 TABLET ORAL at 10:11

## 2023-11-18 RX ADMIN — VANCOMYCIN HYDROCHLORIDE 2000 MG: 5 INJECTION, POWDER, LYOPHILIZED, FOR SOLUTION INTRAVENOUS at 06:11

## 2023-11-18 RX ADMIN — GENTAMICIN SULFATE 91.2 MG: 40 INJECTION, SOLUTION INTRAMUSCULAR; INTRAVENOUS at 06:11

## 2023-11-18 RX ADMIN — LEVALBUTEROL HYDROCHLORIDE 1.25 MG: 1.25 SOLUTION RESPIRATORY (INHALATION) at 03:11

## 2023-11-18 RX ADMIN — GENTAMICIN SULFATE 91.2 MG: 40 INJECTION, SOLUTION INTRAMUSCULAR; INTRAVENOUS at 03:11

## 2023-11-18 RX ADMIN — VARENICLINE TARTRATE 0.5 MG: 0.5 TABLET, FILM COATED ORAL at 10:11

## 2023-11-18 RX ADMIN — SERTRALINE HYDROCHLORIDE 100 MG: 50 TABLET ORAL at 10:11

## 2023-11-18 RX ADMIN — ATORVASTATIN CALCIUM 10 MG: 10 TABLET, FILM COATED ORAL at 08:11

## 2023-11-18 RX ADMIN — ZOLPIDEM TARTRATE 10 MG: 5 TABLET, COATED ORAL at 11:11

## 2023-11-18 RX ADMIN — LACTOBACILLUS ACIDOPHILUS / LACTOBACILLUS BULGARICUS 1 EACH: 100 MILLION CFU STRENGTH GRANULES at 08:11

## 2023-11-18 RX ADMIN — GENTAMICIN SULFATE 91.2 MG: 40 INJECTION, SOLUTION INTRAMUSCULAR; INTRAVENOUS at 11:11

## 2023-11-18 NOTE — PLAN OF CARE
[facility]  Initial Discharge Assessment       Primary Care Provider: Hunter Aguilar III, MD    Admission Diagnosis: Chest pain [R07.9]    Admission Date: 11/17/2023  Expected Discharge Date:     Transition of Care Barriers: None    Payor: [employer] / Plan: [employer] ([employer]) / Product Type: Commercial /     Extended Emergency Contact Information  Primary Emergency Contact: Dallas Jean-Baptiste  Mobile Phone: [phone]  Relation: Brother  Preferred language: English   needed? No    Discharge Plan A: Home  Discharge Plan B: Home    Patient lives alone, reports independence and plans to return home with no needs.       [id number] - [place], [place] - [address] [facility]  [address] [facility] [address]  Phone: [phone] Fax: [phone]    [id number] - [place], [place] - [address] [facility] [address] [facility] [address]  [address]  [place] [address]  Phone: [phone] Fax: [phone]    [facility] - [place], [place] - [address]  [address] [facility] [address]  Phone: [phone] Fax: [phone]      Initial Assessment (most recent)       Adult Discharge Assessment - 11/18/23 1722          Discharge Assessment    Assessment Type Discharge Planning Assessment     Confirmed/corrected address, phone number and insurance Yes     Confirmed Demographics Correct on Facesheet     Source of Information patient     People in Home alone     Do you expect to return to your current living situation? Yes     Prior to hospitilization cognitive status: Alert/Oriented     Current cognitive status: Alert/Oriented     Walking or Climbing Stairs --   independent    Dressing/Bathing --   independent    Equipment Currently Used at Home none     DME Needed Upon Discharge  none     Discharge Plan discussed with: Patient     Transition of Care Barriers None     Discharge Plan A Home      Discharge Plan B Home

## 2023-11-18 NOTE — CONSULTS
"Consult Note  Infectious Disease    Reason for Consult:      HPI: Jacoby Jean-Baptiste is a 48 y.o. male with PMHx of DM 2, PSVT, status post ablation in 2017, HTN, DLP, anemia, smoker, anxiety, insomnia, hepatitis B positive serology, GERD, C diff colitis in March 2023, ulcerative colitis follows with Dr. Jean on Remicade infusion (last 6 weeks ago)came in complaining feeling poorly, like he had the flu, x1 week duration.  Then he became short of breath and had retrosternal chest pain- sharp, going towards his back, associated with nausea vomiting and some abdominal discomfort.  He ruled out for flu and COVID.  WBC 12 , , procalcitonin 17, multiple electrolytes were low, low phosphorus, low magnesium.  CT chest ruled out PE, but found  left lower lobe septic emboli and/or necrosis.    CT abdomen with possible pyelonephritis. Urine with only 3 WBC and 5 RBCs.    He is feeling more comfortable now but he seems scared and gives limited history.  He has to be asked specifically, otherwise does not offer information.  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    He has baseline loose stools  No risk factors for tuberculosis , TB gold negative in April 23  No travel   No specific exposures, no cats no dogs no pets of any kind.  He has trace around with birds but he does not feet them or any other close exposures.  [place] in [place]       Antibiotics (From admission, onward)      Start     Stop Route Frequency Ordered    11/18/23 0300  vancomycin (VANCOCIN) 2,000 mg in dextrose 5 % (D5W) 500 mL IVPB        Note to Pharmacy: Ht: 5' 5" (1.651 m)  Wt: 131 kg (288 lb 12.8 oz)  Estimated Creatinine Clearance: 102.6 mL/min (based on SCr of 1.1 mg/dL).  Body mass index is 48.06 kg/m².    -- IV Every 12 hours " (non-standard times) 11/18/23 0023    11/17/23 1430  gentamicin (GARAMYCIN) 91.2 mg in sodium chloride 0.9% 100 mL IVPB         -- IV Every 8 hours (non-standard times) 11/17/23 1330    11/17/23 1407  gentamicin - pharmacy to dose  (gentamicin IVPB (PEDS and ADULTS))        See Hyperspace for full Linked Orders Report.    -- IV pharmacy to manage frequency 11/17/23 1308    11/17/23 1345  vancomycin (VANCOCIN) 2,000 mg in dextrose 5 % (D5W) 500 mL IVPB         -- IV Once 11/17/23 1233    11/17/23 1331  vancomycin - pharmacy to dose  (vancomycin IVPB (PEDS and ADULTS))        See Hyperspace for full Linked Orders Report.    -- IV pharmacy to manage frequency 11/17/23 1231          Antifungals (From admission, onward)      None          Antivirals (From admission, onward)      None              Review of patient's allergies indicates:  No Known Allergies  Past Medical History:   Diagnosis Date    Diabetes mellitus 01/2022    Hyperlipidemia 2015    Hypertension 2015    Insomnia 2015     Past Surgical History:   Procedure Laterality Date    CARDIAC ELECTROPHYSIOLOGY MAPPING AND ABLATION  2017    SVT    CHOLECYSTECTOMY  2011    COLONOSCOPY N/A 5/3/2022    Procedure: COLONOSCOPY;  Surgeon: Shan Jean III, MD;  Location: [facility];  Service: Endoscopy;  Laterality: N/A;    COLONOSCOPY WITH FECAL MICROBIOTA TRANSFER N/A 3/21/2023    Procedure: COLONOSCOPY, WITH FECAL MICROBIOTA TRANSFER;  Surgeon: David Millan MD;  Location: [facility];  Service: Endoscopy;  Laterality: N/A;    ESOPHAGOGASTRODUODENOSCOPY N/A 4/24/2023    Procedure: EGD (ESOPHAGOGASTRODUODENOSCOPY);  Surgeon: Shan eJan III, MD;  Location: [facility];  Service: Endoscopy;  Laterality: N/A;    GANGLION CYST EXCISION Left 1992    UMBILICAL HERNIA REPAIR  2011    with mesh     Social History     Socioeconomic History    Marital status: Single   Tobacco Use    Smoking status: Every Day     Current packs/day: 1.00     Average packs/day: 1  pack/day for 30.0 years (30.0 ttl pk-yrs)     Types: Cigarettes    Smokeless tobacco: Never    Tobacco comments:     DO NOT SMOKE DOS   Substance and Sexual Activity    Alcohol use: Yes     Comment: ocass    Drug use: Not Currently    Sexual activity: Not Currently     Social Determinants of Health     Financial Resource Strain: Low Risk  (4/21/2023)    Overall Financial Resource Strain (CARDIA)     Difficulty of Paying Living Expenses: Not very hard   Food Insecurity: No Food Insecurity (4/21/2023)    Hunger Vital Sign     Worried About Running Out of Food in the Last Year: Never true     Ran Out of Food in the Last Year: Never true   Transportation Needs: No Transportation Needs (4/21/2023)    PRAPARE - Transportation     Lack of Transportation (Medical): No     Lack of Transportation (Non-Medical): No   Physical Activity: Inactive (4/21/2023)    Exercise Vital Sign     Days of Exercise per Week: 0 days     Minutes of Exercise per Session: 0 min   Stress: No Stress Concern Present (4/21/2023)    [employer] [place] [employer] - Occupational Stress Questionnaire     Feeling of Stress : Only a little   Social Connections: Socially Isolated (4/21/2023)    Social Connection and Isolation Panel [NHANES]     Frequency of Communication with Friends and Family: Twice a week     Frequency of Social Gatherings with Friends and Family: Twice a week     Attends Sabianism Services: Never     Active Member of Clubs or Organizations: No     Attends Club or Organization Meetings: Never     Marital Status: Never    Housing Stability: Low Risk  (4/21/2023)    Housing Stability Vital Sign     Unable to Pay for Housing in the Last Year: No     Number of Places Lived in the Last Year: 1     Unstable Housing in the Last Year: No     Family History   Problem Relation Age of Onset    Asthma Mother          Review of Systems:   He denies fever chills but he felt poorly like he had the flu  No change in vision, loss of  vision or diplopia  No sinus congestion, purulent nasal discharge, post nasal drip or facial pain  No pain in mouth or throat. No problems with teeth, gums.  Chest pain her, retrosternal, sharp, going towards the back.  Associated with cough and yellow phlegm.    No nausea, vomiting, constipation, blood in stool, or focal abd pain,  No dysphagia, odynophagia  He has baseline diarrhea due to UC,  No dysuria, hematuria, strangury, retention, incontinence, nocturia, prostatism,   No penile discharge, genital ulcers, risk for STD  No swelling of joints, redness of joints, injuries, or new focal pain  No unusual headaches, dizziness, vertigo, numbness, paresthesias, neuropathy, falls  No anxiety, depression, substance abuse, sleep disturbance  Positive for thyroid  No bleeding, lymphadenopathy, anemia, malignancy, unusual bruising  No new rashes, lesions, or wounds  No TB exposure  No recent/prior steroids  Outdoor activities:  Travel:   Implants:   Antibiotic History:     EXAM & DIAGNOSTICS REVIEWED:   Vitals:     Temp:  [97.4 °F (36.3 °C)-98.1 °F (36.7 °C)]   Temp: 98 °F (36.7 °C) (11/18/23 1508)  Pulse: 88 (11/18/23 1508)  Resp: 18 (11/18/23 1508)  BP: (!) 140/80 (11/18/23 1508)  SpO2: 95 % (11/18/23 1508)    Intake/Output Summary (Last 24 hours) at 11/18/2023 1711  Last data filed at 11/18/2023 1526  Gross per 24 hour   Intake 240 ml   Output --   Net 240 ml       General:  In NAD. Alert and attentive, cooperative, comfortable  Eyes:  Anicteric, PERRL, EOMI  ENT:  No ulcers, exudates, thrush, nares patent, dentition is  Neck:  supple, no masses or adenopathy appreciated  Lungs: Clear, no consolidation, rales, wheezes, rub  Heart:  RRR, no gallop/murmur/rub noted  Abd:  Soft, NT, ND, normal BS, no masses or organomegaly appreciated.  :  Voids/[place], urine clear, no flank tenderness  Musc:  Joints without effusion, swelling, erythema, synovitis, muscle wasting.   Skin:  No rashes. No palmar or plantar lesions. No  subungual petechiae  Neuro:             Alert, attentive, speech fluent, face symmetric, moves all extremities, no focal weakness. Ambulatory  Psych: Calm, cooperative. Flat affect Pleasant but gives minimal history and only if specifically asked.  Otherwise he does not offer information.  Lymphatic:     No cervical, supraclavicular, axillary, or inguinal nodes  Extrem: No edema, erythema, phlebitis, cellulitis, warm and well perfused  VAD:       Isolation:    Wound:       Lines/Tubes/Drains:    General Labs reviewed:  Recent Labs   Lab 11/17/23  0931 11/18/23  0437 11/18/23  1647   WBC 12.53 12.80* 14.80*   HGB 16.1 13.4* 14.0   HCT 46.4 40.5 41.7   * 160 163       Recent Labs   Lab 11/17/23  0931 11/18/23  0437    137   K 4.1 4.4    107   CO2 26 23   BUN 11 10   CREATININE 1.1 0.9   CALCIUM 9.4 8.4*   PROT 8.4 6.8   BILITOT 0.6 0.5   ALKPHOS 70 57   ALT 27 19   AST 25 18     Recent Labs   Lab 11/17/23  1352   .2*         Micro:  Microbiology Results (last 7 days)       Procedure Component Value Units Date/Time    Stool culture [[id number]]     Order Status: No result Specimen: Stool     Blood culture x two cultures. Draw prior to antibiotics. [[id number]] Collected: 11/17/23 1257    Order Status: Completed Specimen: Blood from Antecubital, Right Arm Updated: 11/18/23 1432     Blood Culture, Routine No Growth to date      No Growth to date    Narrative:      Aerobic and anaerobic    Culture, Respiratory with Gram Stain [[id number]]     Order Status: No result Specimen: Respiratory     AFB Culture & Smear [[id number]]     Order Status: Sent Specimen: Sputum     Blood culture [[id number]] Collected: 11/18/23 1014    Order Status: Sent Specimen: Blood Updated: 11/18/23 1019    Blood culture x two cultures. Draw prior to antibiotics. [[id number]]     Order Status: Canceled Specimen: Blood             Imaging Reviewed:  11/17/2023 CT chest CT abdomen   1. No evidence of pulmonary embolism to  the central main arterial level. If there is continued clinical concern, consider further evaluation with lower extremity doppler or repeat imaging.   2. Scattered ill-defined nodular densities in the lungs, most noticeably in the left lower lobe, some of which show central cavitation suspicious for septic emboli and/or necrosis. Consider correlation with the symptoms, history and possible echocardiogram.   3. Faint striations in the renal nephrograms, possibly secondary to phase of contrast or mild pyelonephritis, consider correlation with urinalysis.   4. Small bilateral nonobstructing renal stones.   5. Hepatomegaly background parenchyma findings a steatosis.   6. Prior cholecystectomy with no intra-/extrahepatic biliary ductal dilation.   Cardiology:  11/17/2023 EC HO     Left Ventricle: The left ventricle is normal in size. Normal wall thickness. There is concentric remodeling. Normal wall motion. There is normal systolic function with a visually estimated ejection fraction of 55 - 60%. There is normal diastolic function.    Right Ventricle: Normal right ventricular cavity size. Wall thickness is normal. Right ventricle wall motion  is normal. Systolic function is normal.    IVC/SVC: Normal venous pressure at 3 mmHg.      IMPRESSION & PLAN   Reason for admission chest pain     Reason for ID consult: nodular lung densities, left lower lung is positive for central necrosis    Immunocompromised, on Remicade for UC.    Dental caries     History of C diff colitis status post fecal microbiology transplant     PMHx of DM 2, PSVT, status post ablation in 2017, HTN, DLP, anemia, smoker, anxiety, insomnia, hepatitis B positive serology, GERD, ulcerative colitis      Recommendations:  Continue current antimicrobials,     Drug screen  Blood cultures daily x3   KATJA--discussed with cardiologist  Monitor markers of inflammation     Workup to rule out fungal etiology, urine histo, urine blasto, Aspergillus antigen,  cryptococcal antigen  Sputum culture   Sputum AFB   Sputum GS  TB gold, although it was checked recently and it was negative.       Anca   ACE  HIV screen, although it was checked within a year and was negative.  Pulmonologist evaluation, for possible bronchoscopy         Medical Decision Making during this encounter was  [_] Low Complexity  [_] Moderate Complexity  [  xx] High Complexity

## 2023-11-18 NOTE — PLAN OF CARE
11/17/23 1958   Patient Assessment/Suction   Level of Consciousness (AVPU) alert   Respiratory Effort Normal;Unlabored   All Lung Fields Breath Sounds diminished   Cough Frequency frequent   Cough Type good;congested;productive   Secretions Amount moderate   Secretions Color green   Secretions Characteristics thick   Skin Integrity   $ Wound Care Tech Time 15 min   Area Observed Left;Right;Behind ear;Nares   Skin Appearance without discoloration   PRE-TX-O2   Device (Oxygen Therapy) room air   SpO2 (!) 94 %   Pulse Oximetry Type Intermittent   $ Pulse Oximetry - Multiple Charge Pulse Oximetry - Multiple   Pulse 110   Resp 18   Aerosol Therapy   $ Aerosol Therapy Charges Aerosol Treatment   Daily Review of Necessity (SVN) completed   Respiratory Treatment Status (SVN) given   Treatment Route (SVN) mask;oxygen   Patient Position (SVN) sitting in chair   Post Treatment Assessment (SVN) breath sounds improved   Signs of Intolerance (SVN) none   Education   $ Education Bronchodilator;15 min   Tobacco Cessation Intervention   Do you use any type of tobacco product? Yes   Are you interested in quitting use of tobacco products? Ready to quit  (quit saturday)   Are you interested in Nicotine Replacement for symptom relief? No   COPD Questionnaire   How often do you cough? 3   How often do you have phlegm (mucus) in your chest? 1   How often does your chest feel tight? 1   When you walk up a hill or one flight of stairs, how often are you breathless? 0   How often are you limited doing any activities at home? 0   How often are you confident leaving the house despite your lung condition? 0   How often do you sleep soundly? 5   How often do you have energy? 0   Total score 10   Respiratory Evaluation   $ Care Plan Tech Time 15 min   $ Eval/Re-eval Charges Evaluation   Home Oxygen   Has Home Oxygen? No   Home Aerosol, MDI, DPI, and Other Treatments/Therapies   Home Respiratory Therapy Per Patient/Review of Chart No

## 2023-11-18 NOTE — PLAN OF CARE
11/18/23 0801   Patient Assessment/Suction   Level of Consciousness (AVPU) alert   Respiratory Effort Unlabored   All Lung Fields Breath Sounds coarse;diminished   PRE-TX-O2   Device (Oxygen Therapy) room air   SpO2 (!) 93 %   Pulse Oximetry Type Intermittent   $ Pulse Oximetry - Multiple Charge Pulse Oximetry - Multiple   Pulse 97   Resp 18   Aerosol Therapy   $ Aerosol Therapy Charges Aerosol Treatment   Respiratory Treatment Status (SVN) given   Treatment Route (SVN) mask   Patient Position (SVN) HOB elevated   Post Treatment Assessment (SVN) increased aeration   Signs of Intolerance (SVN) none   Breath Sounds Post-Respiratory Treatment   Post-treatment Heart Rate (beats/min) 94   Post-treatment Resp Rate (breaths/min) 18   Education   $ Education 15 min;Bronchodilator   Respiratory Evaluation   $ Care Plan Tech Time 15 min

## 2023-11-18 NOTE — SUBJECTIVE & OBJECTIVE
Interval History:  Complains of being hungry.  No chest pain palpitations or fever.  Intermittent productive cough.    Review of Systems Complete ROS otherwise negative other than stated in HPI.   Objective:     Vital Signs (Most Recent):  Temp: 97.7 °F (36.5 °C) (11/18/23 0716)  Pulse: 97 (11/18/23 0801)  Resp: 18 (11/18/23 0801)  BP: (!) 147/81 (11/18/23 0716)  SpO2: (!) 93 % (11/18/23 0801) Vital Signs (24h Range):  Temp:  [97.7 °F (36.5 °C)-98.1 °F (36.7 °C)] 97.7 °F (36.5 °C)  Pulse:  [] 97  Resp:  [18-34] 18  SpO2:  [93 %-95 %] 93 %  BP: (143-189)/(69-96) 147/81     Weight: 131 kg (288 lb 12.8 oz)  Body mass index is 48.06 kg/m².    Intake/Output Summary (Last 24 hours) at 11/18/2023 1031  Last data filed at 11/17/2023 1459  Gross per 24 hour   Intake 2500 ml   Output --   Net 2500 ml         Physical Exam  GENERAL:  Alert and oriented x 3.  Obese sitting up in chair  HEENT:  EOMI. Conjunctivae intact.   NECK:  Supple   LUNGS:  No respiratory distress.  Expiratory wheezing in lower lung fields  CARDIAC:  RRR soft murmur present  ABDOMEN:  Soft,  Nontender and nondistended, no rebound or guarding, bowel sounds present   EXTREMITIES:  No clubbing, cyanosis or edema  SKIN:  No rashes, ulcerations or nodules noted          Significant Labs: All pertinent labs within the past 24 hours have been reviewed.  Blood Culture:   Recent Labs   Lab 11/17/23  1257   LABBLOO No Growth to date     BMP:   Recent Labs   Lab 11/18/23 0437   *      K 4.4      CO2 23   BUN 10   CREATININE 0.9   CALCIUM 8.4*   MG 1.5*     CBC:   Recent Labs   Lab 11/17/23 0931 11/18/23 0437   WBC 12.53 12.80*   HGB 16.1 13.4*   HCT 46.4 40.5   * 160     CMP:   Recent Labs   Lab 11/17/23 0931 11/18/23  0437    137   K 4.1 4.4    107   CO2 26 23   * 115*   BUN 11 10   CREATININE 1.1 0.9   CALCIUM 9.4 8.4*   PROT 8.4 6.8   ALBUMIN 4.4 3.5   BILITOT 0.6 0.5   ALKPHOS 70 57   AST 25 18   ALT 27 19    ANIONGAP 8 7*       Significant Imaging: I have reviewed all pertinent imaging results/findings within the past 24 hours.

## 2023-11-18 NOTE — PROGRESS NOTES
[facility]  Progress Note    Patient Name: Jacoby Jean-Baptiste  MRN: [id number]  Patient Class: IP- Inpatient   Admission Date: 11/17/2023  Length of Stay: 1 days  Attending Physician: Мария Durham MD  Primary Care Provider: Hunter Aguilar III, MD        Subjective:     Principal Problem:Septic embolism        HPI:  Pt is a 48 year old male with a PMH of DM2, anemia, tobacco use, HLD, and anxiety. Pt presents to ED today after complaints of SOB, chest pain, nausea/vomiting, and abdominal pain x1 week. Pt states he has had progressively worsening symptoms and unable to find relief on own over the last week. Pt has history of colitis, CT of abdomen shows potential mild pyelonephritis. Pt does not have CVA tenderness on exam, creatinine 1.1, and UA not diagnostic. Lipase 8, negative flu/covid, and WBC 12.53. CTA of chest shows no evidence of PE but does have L lower lobe suspicion for septic emboli and/or necrosis. Pt has SOB and requires 2L O2 to keep >90%. Pt given nebulizer treatment in ED and has improvement with SOB. Pt on exam states he is feeling better and most symptoms have diminished including chest pain, SOB, nausea, and abdominal pain. Pt started on antibiotics in ED but pt refused due to hx of cdiff and GI giving him antibiotic free window to abide by. Discussed risk vs benefits with new onset sepsis. Cardiology consulted and echo pending. Pt states he stopped smoking approximately 1 week ago, denies alcohol use, and illicit drug use. Full code.     Overview/Hospital Course:  No notes on file    Interval History:  Complains of being hungry.  No chest pain palpitations or fever.  Intermittent productive cough.    Review of Systems Complete ROS otherwise negative other than stated in HPI.   Objective:     Vital Signs (Most Recent):  Temp: 97.7 °F (36.5 °C) (11/18/23 0716)  Pulse: 97 (11/18/23 0801)  Resp: 18 (11/18/23 0801)  BP: (!) 147/81 (11/18/23 0716)  SpO2: (!) 93 %  (11/18/23 0801) Vital Signs (24h Range):  Temp:  [97.7 °F (36.5 °C)-98.1 °F (36.7 °C)] 97.7 °F (36.5 °C)  Pulse:  [] 97  Resp:  [18-34] 18  SpO2:  [93 %-95 %] 93 %  BP: (143-189)/(69-96) 147/81     Weight: 131 kg (288 lb 12.8 oz)  Body mass index is 48.06 kg/m².    Intake/Output Summary (Last 24 hours) at 11/18/2023 1031  Last data filed at 11/17/2023 1459  Gross per 24 hour   Intake 2500 ml   Output --   Net 2500 ml         Physical Exam  GENERAL:  Alert and oriented x 3.  Obese sitting up in chair  HEENT:  EOMI. Conjunctivae intact.   NECK:  Supple   LUNGS:  No respiratory distress.  Expiratory wheezing in lower lung fields  CARDIAC:  RRR soft murmur present  ABDOMEN:  Soft,  Nontender and nondistended, no rebound or guarding, bowel sounds present   EXTREMITIES:  No clubbing, cyanosis or edema  SKIN:  No rashes, ulcerations or nodules noted          Significant Labs: All pertinent labs within the past 24 hours have been reviewed.  Blood Culture:   Recent Labs   Lab 11/17/23  1257   LABBLOO No Growth to date     BMP:   Recent Labs   Lab 11/18/23  0437   *      K 4.4      CO2 23   BUN 10   CREATININE 0.9   CALCIUM 8.4*   MG 1.5*     CBC:   Recent Labs   Lab 11/17/23  0931 11/18/23  0437   WBC 12.53 12.80*   HGB 16.1 13.4*   HCT 46.4 40.5   * 160     CMP:   Recent Labs   Lab 11/17/23  0931 11/18/23  0437    137   K 4.1 4.4    107   CO2 26 23   * 115*   BUN 11 10   CREATININE 1.1 0.9   CALCIUM 9.4 8.4*   PROT 8.4 6.8   ALBUMIN 4.4 3.5   BILITOT 0.6 0.5   ALKPHOS 70 57   AST 25 18   ALT 27 19   ANIONGAP 8 7*       Significant Imaging: I have reviewed all pertinent imaging results/findings within the past 24 hours.    Assessment/Plan:        * Septic embolism  CTA of chest shows septic emboli and/or necrosis   Lactic acid 2.1  IV fluids  Trend infection labs   Blood cultures x 2 pending , send 2nd set as only 1 set was sent on admission  CXR negative   Flu/covid  negative   IV vancomycin and gentamicin .  Consult ID  Monitor leukocytosis.  Afebrile     Chest pain  Resolved.  Admit for telemetry monitoring   Cardiology consulted   Echo pending   EKG PRN for chest pain   Trend cardiac enzymes      Shortness of breath  O2 PRN to keep O2 >90%  Nebulizer treatments   Continuous pulse oximetry         Hypomagnesemia  Trend electrolytes and replete PRN        Anxiety  Continue home regimen      Type 2 diabetes mellitus without complication, without long-term current use of insulin  POCT glucose   Insulin sliding scale .  Controlled     Hyperlipidemia   Continue statin         Tobacco use  Pt states he quit smoking last Saturday   Denies nicotine patch         VTE Risk Mitigation (From admission, onward)           Ordered     enoxaparin injection 40 mg  Daily         11/17/23 1308     IP VTE HIGH RISK PATIENT  Once         11/17/23 1308     Place sequential compression device  Until discontinued         11/17/23 1308                    Discharge Planning   GERARDO:      Code Status: Full Code   Is the patient medically ready for discharge?:     Reason for patient still in hospital (select all that apply): Treatment and Consult recommendations                     Мария Durham MD  Department of Hospital Medicine   [facility]

## 2023-11-18 NOTE — NURSING
Nurses Note -- 4 Eyes      11/17/2023   6:15 PM      Skin assessed during: Admit      [x] No Altered Skin Integrity Present    []Prevention Measures Documented      [] Yes- Altered Skin Integrity Present or Discovered   [] LDA Added if Not in Epic (Describe Wound)   [] New Altered Skin Integrity was Present on Admit and Documented in LDA   [] Wound Image Taken    Wound Care Consulted? No    Attending Nurse:  Yane Wang RN/Staff Member:   Paula Claire

## 2023-11-18 NOTE — PROGRESS NOTES
VANCOMYCIN PHARMACOKINETIC NOTE:  Vancomycin Day # 1    Objective/Assessment:    Diagnosis/Indication for Vancomycin:Bacteremia      48 y.o., male; Actual Body Weight = 131 kg (288 lb 12.8 oz).    The patient has the following labs:  11/18/2023 Estimated Creatinine Clearance: 102.6 mL/min (based on SCr of 1.1 mg/dL). Lab Results   Component Value Date    BUN 11 11/17/2023     Lab Results   Component Value Date    WBC 12.53 11/17/2023          Plan:  Adjust vancomycin dose and/or frequency based on the patient's actual weight and renal function:  Initiate Vancomycin 2000 mg IV every 12 hours.  Orders have been entered into patient's chart.        Vancomycin trough level has been ordered for 11/19 at 02:00.    Pharmacy will manage vancomycin therapy, monitor serum vancomycin levels, monitor renal function and adjust regimen as necessary.    Thank you for allowing us to participate in this patient's care.     Jessica Paola Lauren 11/18/2023   Department of Pharmacy  Ext [phone]

## 2023-11-18 NOTE — PROGRESS NOTES
11/18/2023 Pharmacokinetic Assessment: IV Gentamicin  Gentamicin DAY 2 - Jacoby Jean-Baptiste is a 48 y.o. male being treated for endocarditis.   Goal peak >3 mg/L.   Goal trough <1-2 mg/L.     Dialysis Method (if applicable):N/A     Gentamicin Regimen Plan:   Continue Gentamicin 91.2 mg Q8H   Peak level taken after 5th dose on 11/19 @ 0730  Trough level taken before 6th dose on 11/19 @ 13:30    Day of Thx Date Current Weight (kg) Laboratory     Doses    Gentamicin Level      Time K+ Mg BUN SCr CrCl WBC Scheduled Time Time Dose Dosage (mcg/ml) Peak,  Random,  Trough?   1 11/17/23 136.1 09:21 4.1 1.3 11 1.1 104.9 12.50 14:30 NG               22:30  1 91     2 11/18 131.0 04:37 4.4 1.5 10 0.9 125.4 12.80 06:30 06:22 2 91               07:30 08:17 - - 4.3 Peak             13:30 14:37 - - 1.2 Trough             14:30 15:26 3 91               22:30  4 91     3 11/19         06:30  5 91               07:30  - -  Peak             13:30  - -  Trough                       Rationale for Plan: Per protocol    Labs:  Estimated Creatinine Clearance: 112.9 mL/min (based on SCr of 1 mg/dL).  Recent Labs   Lab Result Units 11/17/23  0931 11/18/23  0437 11/18/23  1647   WBC K/uL 12.53 12.80* 14.80*   Creatinine mg/dL 1.1 0.9 1.0       Cxs:   Microbiology Results (last 7 days)       Procedure Component Value Units Date/Time    Blood culture [[id number]] Collected: 11/18/23 1014    Order Status: Completed Specimen: Blood Updated: 11/18/23 1717     Blood Culture, Routine No Growth to date    Stool culture [[id number]]     Order Status: No result Specimen: Stool     Blood culture x two cultures. Draw prior to antibiotics. [[id number]] Collected: 11/17/23 1257    Order Status: Completed Specimen: Blood from Antecubital, Right Arm Updated: 11/18/23 1432     Blood Culture, Routine No Growth to date      No Growth to date    Narrative:      Aerobic and anaerobic    Culture, Respiratory with Gram Stain [[id number]]     Order Status: No result  Specimen: Respiratory     AFB Culture & Smear [[id number]]     Order Status: Sent Specimen: Sputum     Blood culture x two cultures. Draw prior to antibiotics. [[id number]]     Order Status: Canceled Specimen: Blood             Pharmacy will continue to follow and monitor vancomycin.   Please contact pharmacy at extension [phone] with any questions regarding this assessment.     Thank you for the consult,   Lexi Perez, PharmD.  [facility] PGY1 Pharmacy Resident

## 2023-11-18 NOTE — PROGRESS NOTES
Pharmacist Renal Dose Adjustment Note    Jacoby Jean-Baptiste is a 48 y.o. male being treated with the medication lovenox 40mg every day    Patient Data:    Vital Signs (Most Recent):  Temp: 97.7 °F (36.5 °C) (11/18/23 0716)  Pulse: 97 (11/18/23 0801)  Resp: 18 (11/18/23 0801)  BP: (!) 147/81 (11/18/23 0716)  SpO2: (!) 93 % (11/18/23 0801) Vital Signs (72h Range):  Temp:  [97.7 °F (36.5 °C)-98.1 °F (36.7 °C)]   Pulse:  []   Resp:  [18-34]   BP: (143-189)/()   SpO2:  [91 %-96 %]      Recent Labs   Lab 11/17/23  0931 11/18/23  0437   CREATININE 1.1 0.9     Serum creatinine: 0.9 mg/dL 11/18/23 0437  Estimated creatinine clearance: 125.4 mL/min    Medication:lovenox dose: 40mg frequency every 24 hour will be changed to medication lovenox dose:40mg frequency:every 12 hour because patient bmi is >40    Pharmacist's Name: Tigist Coates  Pharmacist's Extension: [phone]

## 2023-11-18 NOTE — CONSULTS
[facility]  Department of Cardiology  Consult Note      PATIENT NAME: Jacoby Jean-Baptiste    MRN: [id number]  TODAY'S DATE: 11/18/2023  ADMIT DATE: 11/17/2023                          CONSULT REQUESTED BY: Мария Durham MD    SUBJECTIVE     PRINCIPAL PROBLEM: Septic embolism      REASON FOR CONSULT:    From H&P:Patient is a 48-year-old male with a past medical history of hypertension, hyperlipidemia, diabetes, insomnia who presented to the emergency department for evaluation of nausea, vomiting, shortness of breath.  He is a history of C diff and is very concerned about taking any additional antibiotics.       In the ED, heart rate 118, respirations 19, 152/104, 91% on room air.  WBC 12.53, platelet 145, creatinine 1.1, magnesium 1.3, UA shows 3 WBCs with 1+ ketones.  Chest x-ray shows mild patchy infiltrations bilaterally, CTA chest shows no PE however does show nodular density concerning for septic emboli.  Hepatic steatosis and nonobstructing renal stones.  Patient was given hydralazine, magnesium, 1 L NS, DuoNebs, and metoclopramide in ED.  Vancomycin cefepime were ordered however patient refused due to concerns about C diff.    PE: Heart:    S1, S2 auscultated. No murmurs rubs or gallops. Regular rhythm and rate.   Lungs:    Bilaterally clear in all lobes with equal chest rise.  No wheezes, rales, or rhonchi.   Abdomen:    Soft, nontender, nondistended.  Positive bowel sounds. No palpable masses.  No rebound or guarding.   Extremities:    Without clubbing or cyanosis.  No edema.  Peripheral pulses II/IV.     Plan:  Patient will be admitted for sepsis secondary to suspected septic emboli.  Obtain blood cultures  Empiric treatment with vancomycin and gentamicin.  He is agreeable to start antibiotics now.  Echocardiogram ordered and consulted Cardiology.    Complete sepsis fluid bolus and continue maintenance fluids.    Obtain procalcitonin.  Admit to med tele      HPI:    Patient is a 48-year-old male  who presented to the emergency room with complaints of shortness of breath, nausea, vomiting, and chest tightness.  Patient states the chest tightness was associated with the shortness of breath and illness overall.  He is feeling better at the present time.  Patient was found to have concern for septic emboli on CTA of the chest but declined antibiotics in the emergency room due to recent prolonged history of C diff colitis.  Patient is noted to be wheezing on exam today.  His cardiac enzymes have been negative.  Patient had a negative stress test earlier this year.        Review of patient's allergies indicates:  No Known Allergies    Past Medical History:   Diagnosis Date    Diabetes mellitus 01/2022    Hyperlipidemia 2015    Hypertension 2015    Insomnia 2015     Past Surgical History:   Procedure Laterality Date    CARDIAC ELECTROPHYSIOLOGY MAPPING AND ABLATION  2017    SVT    CHOLECYSTECTOMY  2011    COLONOSCOPY N/A 5/3/2022    Procedure: COLONOSCOPY;  Surgeon: Shan Jean III, MD;  Location: [facility];  Service: Endoscopy;  Laterality: N/A;    COLONOSCOPY WITH FECAL MICROBIOTA TRANSFER N/A 3/21/2023    Procedure: COLONOSCOPY, WITH FECAL MICROBIOTA TRANSFER;  Surgeon: David Millan MD;  Location: [facility];  Service: Endoscopy;  Laterality: N/A;    ESOPHAGOGASTRODUODENOSCOPY N/A 4/24/2023    Procedure: EGD (ESOPHAGOGASTRODUODENOSCOPY);  Surgeon: Shan Jean III, MD;  Location: [facility];  Service: Endoscopy;  Laterality: N/A;    GANGLION CYST EXCISION Left 1992    UMBILICAL HERNIA REPAIR  2011    with mesh     Social History     Tobacco Use    Smoking status: Every Day     Current packs/day: 1.00     Average packs/day: 1 pack/day for 30.0 years (30.0 ttl pk-yrs)     Types: Cigarettes    Smokeless tobacco: Never    Tobacco comments:     DO NOT SMOKE DOS   Substance Use Topics    Alcohol use: Yes     Comment: ocass    Drug use: Not Currently        REVIEW OF SYSTEMS  Per HPI    OBJECTIVE      VITAL SIGNS (Most Recent)  Temp: 97.4 °F (36.3 °C) (11/18/23 1110)  Pulse: 88 (11/18/23 1508)  Resp: 18 (11/18/23 1508)  BP: 125/77 (11/18/23 1110)  SpO2: 95 % (11/18/23 1508)    VENTILATION STATUS  Resp: 18 (11/18/23 1508)  SpO2: 95 % (11/18/23 1508)           I & O (Last 24H):  Intake/Output Summary (Last 24 hours) at 11/18/2023 1611  Last data filed at 11/18/2023 1526  Gross per 24 hour   Intake 240 ml   Output --   Net 240 ml       WEIGHTS  Wt Readings from Last 1 Encounters:   11/17/23 1817 131 kg (288 lb 12.8 oz)   11/17/23 0813 136.1 kg (300 lb)       PHYSICAL EXAM  GENERAL: well built, well nourished, well-developed in no apparent distress alert and oriented.   HEENT: Normocephalic.   Poor dentition  NECK: No JVD  CARDIAC: Regular rate and rhythm. S1 is normal.S2 is normal.No gallops, clicks or murmurs noted at this time.  CHEST ANATOMY: normal.   LUNGS: Clear to auscultation. No wheezing or rhonchi..   ABDOMEN: Soft    EXTREMITIES: No cyanosis, clubbing or edema noted at this time.  CENTRAL NERVOUS SYSTEM: No focal motor or sensory deficits noted.     HOME MEDICATIONS:  Current Facility-Administered Medications on File Prior to Encounter   Medication Dose Route Frequency Provider Last Rate Last Admin    lactated ringers infusion   Intravenous Continuous David Millan MD 75 mL/hr at 03/21/23 1252 New Bag at 03/21/23 1252     Current Outpatient Medications on File Prior to Encounter   Medication Sig Dispense Refill    busPIRone (BUSPAR) 7.5 MG tablet Take 1 tablet (7.5 mg total) by mouth 3 (three) times daily. 90 tablet 1    dicyclomine (BENTYL) 10 MG capsule Take 10 mg by mouth 3 (three) times daily as needed.      metFORMIN (GLUCOPHAGE-XR) 500 MG ER 24hr tablet Take 1 tablet (500 mg total) by mouth 2 (two) times daily with meals. 180 tablet 1    metoprolol succinate (TOPROL-XL) 50 MG 24 hr tablet Take 1 tablet (50 mg total) by mouth once daily. 90 tablet 1    pantoprazole (PROTONIX) 40 MG tablet  Take 1 tablet (40 mg total) by mouth 2 (two) times daily. 180 tablet 3    promethazine (PHENERGAN) 12.5 MG Tab Take 12.5 mg by mouth 4 (four) times daily as needed.      semaglutide (OZEMPIC) 1 mg/dose (4 mg/3 mL) Inject 1 mg into the skin every 7 days. 3 each 1    sertraline (ZOLOFT) 100 MG tablet Take 1 tablet (100 mg total) by mouth once daily. 90 tablet 1    simvastatin (ZOCOR) 20 MG tablet Take 1 tablet (20 mg total) by mouth every evening. 90 tablet 1    varenicline (CHANTIX STARTING MONTH BOX) 0.5 mg (11)- 1 mg (42) tablet Take one 0.5mg tab by mouth once daily X3 days,then increase to one 0.5mg tab twice daily X4 days,then increase to one 1mg tab twice daily (Patient taking differently: Take 1 tablet by mouth 2 (two) times daily. Take one 0.5mg tab by mouth once daily X3 days,then increase to one 0.5mg tab twice daily X4 days,then increase to one 1mg tab twice daily) 1 each 0    zolpidem (AMBIEN) 10 mg Tab Take 1 tablet (10 mg total) by mouth nightly as needed (insomnia). 30 tablet 2       SCHEDULED MEDS:   atorvastatin  10 mg Oral QHS    busPIRone  7.5 mg Oral TID    enoxparin  40 mg Subcutaneous Q12H (prophylaxis, 0900/2100)    gentamicin  1 mg/kg (Adjusted) Intravenous Q8H    lactobacillus acidophilus & bulgar  1 packet Oral BID    levalbuterol  1.25 mg Nebulization Q8H    metoprolol succinate  50 mg Oral Daily    pantoprazole  40 mg Oral BID    polyethylene glycol  17 g Oral Daily    sertraline  100 mg Oral Daily    vancomycin (VANCOCIN) IV (PEDS and ADULTS)  2,000 mg Intravenous Once    vancomycin (VANCOCIN) IV (PEDS and ADULTS)  2,000 mg Intravenous Q12H    varenicline  0.5 mg Oral BID       CONTINUOUS INFUSIONS:   sodium chloride 0.9% 125 mL/hr at 11/18/23 1026       PRN MEDS:acetaminophen, dextrose 50%, dextrose 50%, Pharmacy to dose Aminoglycosides consult **AND** gentamicin - pharmacy to dose, glucagon (human recombinant), glucose, glucose, HYDROcodone-acetaminophen, insulin aspart U-100, magnesium  "oxide, magnesium oxide, melatonin, ondansetron, potassium bicarbonate, potassium bicarbonate, potassium bicarbonate, potassium, sodium phosphates, potassium, sodium phosphates, potassium, sodium phosphates, promethazine, Pharmacy to dose Vancomycin consult **AND** vancomycin - pharmacy to dose, zolpidem    LABS AND DIAGNOSTICS     CBC LAST 3 DAYS  Recent Labs   Lab 11/17/23  0931 11/18/23  0437   WBC 12.53 12.80*   RBC 4.65 4.01*   HGB 16.1 13.4*   HCT 46.4 40.5   * 101*   MCH 34.6* 33.4*   MCHC 34.7 33.1   RDW 13.7 13.8   * 160   MPV 11.3 11.4   GRAN 82.0*  10.3* 78.0*  10.0*   LYMPH 8.9*  1.1 10.5*  1.4   MONO 8.5  1.1* 9.9  1.3*   BASO 0.03 0.04   NRBC 0 0       COAGULATION LAST 3 DAYS  No results for input(s): "LABPT", "INR", "APTT" in the last 168 hours.    CHEMISTRY LAST 3 DAYS  Recent Labs   Lab 11/17/23  0931 11/18/23  0437    137   K 4.1 4.4    107   CO2 26 23   ANIONGAP 8 7*   BUN 11 10   CREATININE 1.1 0.9   * 115*   CALCIUM 9.4 8.4*   MG 1.3* 1.5*   ALBUMIN 4.4 3.5   PROT 8.4 6.8   ALKPHOS 70 57   ALT 27 19   AST 25 18   BILITOT 0.6 0.5       CARDIAC PROFILE LAST 3 DAYS  Recent Labs   Lab 11/17/23  0931 11/17/23  1126   BNP 18  --    TROPONINIHS 7.2 7.2       ENDOCRINE LAST 3 DAYS  Recent Labs   Lab 11/17/23  1350   PROCAL 17.063*       LAST ARTERIAL BLOOD GAS  ABG  No results for input(s): "PH", "PO2", "PCO2", "HCO3", "BE" in the last 168 hours.    LAST 7 DAYS MICROBIOLOGY   Microbiology Results (last 7 days)       Procedure Component Value Units Date/Time    Blood culture x two cultures. Draw prior to antibiotics. [[id number]] Collected: 11/17/23 1257    Order Status: Completed Specimen: Blood from Antecubital, Right Arm Updated: 11/18/23 1432     Blood Culture, Routine No Growth to date      No Growth to date    Narrative:      Aerobic and anaerobic    Culture, Respiratory with Gram Stain [[id number]]     Order Status: No result Specimen: Respiratory     AFB " Culture & Smear [[id number]]     Order Status: Sent Specimen: Sputum     Blood culture [[id number]] Collected: 11/18/23 1014    Order Status: Sent Specimen: Blood Updated: 11/18/23 1019    Blood culture x two cultures. Draw prior to antibiotics. [[id number]]     Order Status: Canceled Specimen: Blood             MOST RECENT IMAGING  Echo    Left Ventricle: The left ventricle is normal in size. Normal wall   thickness. There is concentric remodeling. Normal wall motion. There is   normal systolic function with a visually estimated ejection fraction of 55   - 60%. There is normal diastolic function.    Right Ventricle: Normal right ventricular cavity size. Wall thickness   is normal. Right ventricle wall motion  is normal. Systolic function is   normal.    IVC/SVC: Normal venous pressure at 3 mmHg.  CT Abdomen Pelvis With IV Contrast  CMS MANDATED QUALITY DATA - CT RADIATION  436    All CT scans at this facility utilize dose modulation, iterative reconstruction, and/or weight based dosing when appropriate to reduce radiation dose to as low as reasonably achievable.    CLINICAL HISTORY:  48 years (1975) Male Pulmonary embolism (PE) suspected, high prob    TECHNIQUE:  CT CHEST ANGIOGRAPHY WITH IV CONTRAST, CT ABDOMEN PELVIS WITH IV CONTRAST.  Axial CT examination of the chest with attention to the pulmonary arteries was performed using contiguous axial images from the diaphragms to the lung apices following the intravenous administration of non-ionic contrast material.  Images were reviewed using lung, mediastinal, and bone windows. The study was performed with thin sections with subsequent 3-D reformations, mid and reconstructions at multiple planes with images were stored in the PACS system. Subsequently axial CT of the abdomen and pelvis was obtained.    COMPARISON:  None available.    FINDINGS:  CTA PE evaluation: This is a low quality study for the evaluation of pulmonary embolism secondary to a combination of  contrast bolus timing and motion artifact. The pulmonary arteries are normal in appearance without pulmonary emboli noted up to the central main arterial level, noting the limitations of CT technique for identifying small or isolated subsegmental emboli.    CT Chest:  Visualized neck: Within normal limits.  Lungs: Very mild central hilar groundglass attenuation opacity is present. In addition there is ill-defined nodular densities as outlined below:  -6 mm left apical pulmonary nodule (image 83)  -6 mm and adjacent 7 mm pulmonary nodule in the left lower lobe (image 169)  -11 mm soft tissue pulmonary nodule in the left lower lobe (image 205)  -9 mm and adjacent 8mm cavitary pulmonary nodule in the adjacent left lower lobe (image 211)  Airway: The trachea and central bronchial tree appear normal.  Pleura: There is no pleural effusion. There is no pneumothorax.  Cardiovascular: The heart is normal in size. There are no findings of right heart failure. The pulmonary artery is normal in size. There is no pericardial effusion. The thoracic aorta and great vessels are normal in course and caliber.  Mediastinum: Small lymph nodes are present, for example:  -15 x 10 mm AP window node (image 134)  -21 x 12 mm right precarinal node (image 1:15)  -17 x 10 mm subcarinal node (image 136)  Soft tissues: The peripheral soft tissues appear normal.  Musculoskeletal: The visualized osseous structures appear normal.  There are no suspicious osseous lesions.  Esophagus: The esophagus appears grossly normal.    CT Abdomen:  Liver: Relative diffuse low-attenuation liver consistent with hepatic steatosis. The liver is enlarged measuring 19.7 cm (sagittal right lobe).  Gallbladder: The gallbladder is surgically absent.  Biliary Tree: No intra or extrahepatic ductal dilation.  Spleen: Within normal limits.  Pancreas: There is a moderate-sized periampullary duodenal diverticulum. No ductal dilation, differential enhancement or mass is  identified.  Adrenal Glands: The adrenal glands appear within normal limits.  Kidneys: No hydronephrosis/hydroureter or evidence of obstructive uropathy. There is a 4 mm nonobstructing stone in the inferior pole the left kidney and 2 mm nonobstructing stone in the inferior pole the right kidney (image 80 and 83 respectively). There mild faint striations in the renal nephrograms.  Vasculature: The aorta is normal in course and caliber.  Lymph nodes: No abdominal lymphadenopathy is seen.  Intraperitoneal structures: There is no ascites.  Bowel: Scattered sigmoid diverticula are present without focal diverticulitis. There is a rim calcified structure adjacent to the cecum (image 150) containing fat suggesting sequelae of old infection/trauma or a remote torsed epiploic appendage. The bowel is normal in course and caliber with no finding of obstruction, intra-abdominal free air or abscess. The appendix is normal (image 141)  Abdominal wall: The abdominal wall and musculature are normal.  Musculoskeletal: No acute osseous abnormality is identified.    CT Pelvis:  Bladder: The urinary bladder is within normal limits.  Reproductive Organs: The prostate and seminal vesicles are within normal limits.  Pelvic Lymph nodes: No pelvic lymphadenopathy or pelvic mass is identified.    IMPRESSION:  1. No evidence of pulmonary embolism to the central main arterial level. If there is continued clinical concern, consider further evaluation with lower extremity doppler or repeat imaging.  2. Scattered ill-defined nodular densities in the lungs, most noticeably in the left lower lobe, some of which show central cavitation suspicious for septic emboli and/or necrosis. Consider correlation with the symptoms, history and possible echocardiogram.  3. Faint striations in the renal nephrograms, possibly secondary to phase of contrast or mild pyelonephritis, consider correlation with urinalysis.  4. Small bilateral nonobstructing renal  stones.  5. Hepatomegaly background parenchyma findings a steatosis.  6. Prior cholecystectomy with no intra-/extrahepatic biliary ductal dilation.    .    Electronically signed by:  Adrian Lynch MD  11/17/2023 12:27 PM CST Workstation: [id number]  CTA Chest Non-Coronary (PE Studies)  CMS MANDATED QUALITY DATA - CT RADIATION  436    All CT scans at this facility utilize dose modulation, iterative reconstruction, and/or weight based dosing when appropriate to reduce radiation dose to as low as reasonably achievable.    CLINICAL HISTORY:  48 years (1975) Male Pulmonary embolism (PE) suspected, high prob    TECHNIQUE:  CT CHEST ANGIOGRAPHY WITH IV CONTRAST, CT ABDOMEN PELVIS WITH IV CONTRAST.  Axial CT examination of the chest with attention to the pulmonary arteries was performed using contiguous axial images from the diaphragms to the lung apices following the intravenous administration of non-ionic contrast material.  Images were reviewed using lung, mediastinal, and bone windows. The study was performed with thin sections with subsequent 3-D reformations, mid and reconstructions at multiple planes with images were stored in the PACS system. Subsequently axial CT of the abdomen and pelvis was obtained.    COMPARISON:  None available.    FINDINGS:  CTA PE evaluation: This is a low quality study for the evaluation of pulmonary embolism secondary to a combination of contrast bolus timing and motion artifact. The pulmonary arteries are normal in appearance without pulmonary emboli noted up to the central main arterial level, noting the limitations of CT technique for identifying small or isolated subsegmental emboli.    CT Chest:  Visualized neck: Within normal limits.  Lungs: Very mild central hilar groundglass attenuation opacity is present. In addition there is ill-defined nodular densities as outlined below:  -6 mm left apical pulmonary nodule (image 83)  -6 mm and adjacent 7 mm pulmonary nodule in the left  lower lobe (image 169)  -11 mm soft tissue pulmonary nodule in the left lower lobe (image 205)  -9 mm and adjacent 8mm cavitary pulmonary nodule in the adjacent left lower lobe (image 211)  Airway: The trachea and central bronchial tree appear normal.  Pleura: There is no pleural effusion. There is no pneumothorax.  Cardiovascular: The heart is normal in size. There are no findings of right heart failure. The pulmonary artery is normal in size. There is no pericardial effusion. The thoracic aorta and great vessels are normal in course and caliber.  Mediastinum: Small lymph nodes are present, for example:  -15 x 10 mm AP window node (image 134)  -21 x 12 mm right precarinal node (image 1:15)  -17 x 10 mm subcarinal node (image 136)  Soft tissues: The peripheral soft tissues appear normal.  Musculoskeletal: The visualized osseous structures appear normal.  There are no suspicious osseous lesions.  Esophagus: The esophagus appears grossly normal.    CT Abdomen:  Liver: Relative diffuse low-attenuation liver consistent with hepatic steatosis. The liver is enlarged measuring 19.7 cm (sagittal right lobe).  Gallbladder: The gallbladder is surgically absent.  Biliary Tree: No intra or extrahepatic ductal dilation.  Spleen: Within normal limits.  Pancreas: There is a moderate-sized periampullary duodenal diverticulum. No ductal dilation, differential enhancement or mass is identified.  Adrenal Glands: The adrenal glands appear within normal limits.  Kidneys: No hydronephrosis/hydroureter or evidence of obstructive uropathy. There is a 4 mm nonobstructing stone in the inferior pole the left kidney and 2 mm nonobstructing stone in the inferior pole the right kidney (image 80 and 83 respectively). There mild faint striations in the renal nephrograms.  Vasculature: The aorta is normal in course and caliber.  Lymph nodes: No abdominal lymphadenopathy is seen.  Intraperitoneal structures: There is no ascites.  Bowel: Scattered  sigmoid diverticula are present without focal diverticulitis. There is a rim calcified structure adjacent to the cecum (image 150) containing fat suggesting sequelae of old infection/trauma or a remote torsed epiploic appendage. The bowel is normal in course and caliber with no finding of obstruction, intra-abdominal free air or abscess. The appendix is normal (image 141)  Abdominal wall: The abdominal wall and musculature are normal.  Musculoskeletal: No acute osseous abnormality is identified.    CT Pelvis:  Bladder: The urinary bladder is within normal limits.  Reproductive Organs: The prostate and seminal vesicles are within normal limits.  Pelvic Lymph nodes: No pelvic lymphadenopathy or pelvic mass is identified.    IMPRESSION:  1. No evidence of pulmonary embolism to the central main arterial level. If there is continued clinical concern, consider further evaluation with lower extremity doppler or repeat imaging.  2. Scattered ill-defined nodular densities in the lungs, most noticeably in the left lower lobe, some of which show central cavitation suspicious for septic emboli and/or necrosis. Consider correlation with the symptoms, history and possible echocardiogram.  3. Faint striations in the renal nephrograms, possibly secondary to phase of contrast or mild pyelonephritis, consider correlation with urinalysis.  4. Small bilateral nonobstructing renal stones.  5. Hepatomegaly background parenchyma findings a steatosis.  6. Prior cholecystectomy with no intra-/extrahepatic biliary ductal dilation.    .    Electronically signed by:  Adrian Lynch MD  11/17/2023 12:27 PM CST Workstation: [id number]  X-Ray Chest AP Portable  XR CHEST 1 VIEW    CLINICAL HISTORY:  48 years Male Chest Pain    COMPARISON: April 20, 2023    FINDINGS: Cardiac silhouette size is stable compared to prior. No pulmonary edema, confluent airspace disease, pleural effusion, or pneumothorax. No acute osseous  abnormality.    IMPRESSION:    No acute pulmonary pathology.    Electronically signed by:  Rickie Brown MD  11/17/2023 08:47 AM CST Workstation: [id number]      ECHOCARDIOGRAM RESULTS (last 5)  Results for orders placed during the hospital encounter of 11/17/23    Echo    Interpretation Summary    Left Ventricle: The left ventricle is normal in size. Normal wall thickness. There is concentric remodeling. Normal wall motion. There is normal systolic function with a visually estimated ejection fraction of 55 - 60%. There is normal diastolic function.    Right Ventricle: Normal right ventricular cavity size. Wall thickness is normal. Right ventricle wall motion  is normal. Systolic function is normal.    IVC/SVC: Normal venous pressure at 3 mmHg.      CURRENT/PREVIOUS VISIT EKG  Results for orders placed or performed during the hospital encounter of 11/17/23   EKG 12-lead    Collection Time: 11/17/23  9:54 AM    Narrative    Test Reason : R06.02,    Vent. Rate : 123 BPM     Atrial Rate : 123 BPM     P-R Int : 130 ms          QRS Dur : 058 ms      QT Int : 336 ms       P-R-T Axes : 056 023 033 degrees     QTc Int : 481 ms    Sinus tachycardia  Possible Left atrial enlargement  Borderline Abnormal ECG  When compared with ECG of 17-NOV-2023 08:22,  No significant change was found    Referred By: [email or web address]   SELF           Confirmed By:            ASSESSMENT/PLAN:     Active Hospital Problems    Diagnosis    *Septic embolism    Shortness of breath    Chest pain    Hypomagnesemia    Anxiety    Hyperlipidemia    Type 2 diabetes mellitus without complication, without long-term current use of insulin    Tobacco use       ASSESSMENT & PLAN:     Atypical chest pain  Concern for septic embolism  Abnormal dentition  Recent history of C diff colitis requiring fecal transplant  Hypomagnesemia  Hypertension  Diabetes      RECOMMENDATIONS:    Patient seen and findings discussed with patient as well as Infectious Disease Dr. Dickey  on the patient.  There is concern for possible septic embolism and he will be treated as such for now.  ID is requesting KATJA.  Will plan for this tentatively on Monday November 20th 2023.  In the meantime his chest pain seems to have improved and appears to be more related to pulmonary issues as opposed to cardiac.  Troponin levels have been negative thus far and he had a negative stress test earlier this year.  Can continue to monitor him on telemetry for any arrhythmias.  Continue to check and replace potassium and magnesium. Goal for potassium is 4.0, and goal for magnesium is 2.0.   Risks of KATJA were discussed with patient the risks include but not limited to oropharyngeal trauma, dental trauma, trauma to the esophagus, possible aspiration, and reaction to anesthesia.  He is agreeable to proceed.  Will see the patient p.r.n. tomorrow and plan to follow-up on Monday for KATJA.          Isabel Wang NP  Date of Service: 11/18/2023  4:11 PM    I have personally interviewed and examined the patient, I have reviewed the Nurse Practitioner's history and physical, assessment, and plan. I have personally evaluated the patient at bedside and agree with the findings and made appropriate changes as necessary in recommendations.  Cardiology was consulted for chest pain.  Patient had chest tightness secondary to shortness of breath from lung infection.  Normal LV function on echo.  High sensitivity troponin negative.  No significant ischemic changes on EKG.  He denies any chest pain with ambulation.  Chest pain is likely secondary to lung infection.  Discussed with ID at bedside in the patient room today.  KATJA was recommended by ID and will be scheduled for Monday.     Josh Mathis MD  Department of Cardiology  [facility]  11/18/23

## 2023-11-19 LAB
BASOPHILS # BLD AUTO: 0.02 K/UL (ref 0–0.2)
BASOPHILS NFR BLD: 0.1 % (ref 0–1.9)
DIFFERENTIAL METHOD: ABNORMAL
EOSINOPHIL # BLD AUTO: 0.5 K/UL (ref 0–0.5)
EOSINOPHIL NFR BLD: 3.7 % (ref 0–8)
ERYTHROCYTE [DISTWIDTH] IN BLOOD BY AUTOMATED COUNT: 13.7 % (ref 11.5–14.5)
GENTAMICIN PEAK SERPL-MCNC: 1.7 UG/ML
GENTAMICIN PEAK SERPL-MCNC: 5.2 UG/ML
GENTAMICIN TROUGH SERPL-MCNC: 1.5 UG/ML
GLUCOSE SERPL-MCNC: 122 MG/DL (ref 70–110)
GLUCOSE SERPL-MCNC: 122 MG/DL (ref 70–110)
GLUCOSE SERPL-MCNC: 88 MG/DL (ref 70–110)
GLUCOSE SERPL-MCNC: 95 MG/DL (ref 70–110)
HCT VFR BLD AUTO: 38 % (ref 40–54)
HGB BLD-MCNC: 12.9 G/DL (ref 14–18)
IMM GRANULOCYTES # BLD AUTO: 0.15 K/UL (ref 0–0.04)
IMM GRANULOCYTES NFR BLD AUTO: 1.1 % (ref 0–0.5)
LYMPHOCYTES # BLD AUTO: 1.1 K/UL (ref 1–4.8)
LYMPHOCYTES NFR BLD: 7.6 % (ref 18–48)
MCH RBC QN AUTO: 33.7 PG (ref 27–31)
MCHC RBC AUTO-ENTMCNC: 33.9 G/DL (ref 32–36)
MCV RBC AUTO: 99 FL (ref 82–98)
MISCELLANEOUS TEST NAME: NORMAL
MISCELLANEOUS TEST NAME: NORMAL
MONOCYTES # BLD AUTO: 1.4 K/UL (ref 0.3–1)
MONOCYTES NFR BLD: 9.9 % (ref 4–15)
NEUTROPHILS # BLD AUTO: 10.9 K/UL (ref 1.8–7.7)
NEUTROPHILS NFR BLD: 77.6 % (ref 38–73)
NRBC BLD-RTO: 0 /100 WBC
PLATELET # BLD AUTO: 196 K/UL (ref 150–450)
PMV BLD AUTO: 11.1 FL (ref 9.2–12.9)
RBC # BLD AUTO: 3.83 M/UL (ref 4.6–6.2)
VANCOMYCIN SERPL-MCNC: 14.2 UG/ML
VANCOMYCIN TROUGH SERPL-MCNC: 26.3 UG/ML
WBC # BLD AUTO: 14.08 K/UL (ref 3.9–12.7)

## 2023-11-19 PROCEDURE — 93005 ELECTROCARDIOGRAM TRACING: CPT | Performed by: GENERAL PRACTICE

## 2023-11-19 PROCEDURE — 86480 TB TEST CELL IMMUN MEASURE: CPT | Performed by: INTERNAL MEDICINE

## 2023-11-19 PROCEDURE — 87899 AGENT NOS ASSAY W/OPTIC: CPT | Performed by: INTERNAL MEDICINE

## 2023-11-19 PROCEDURE — 30000890 MISCELLANEOUS SENDOUT TEST, BLOOD: Mod: 91 | Performed by: INTERNAL MEDICINE

## 2023-11-19 PROCEDURE — 83735 ASSAY OF MAGNESIUM: CPT | Performed by: PHYSICAL THERAPY ASSISTANT

## 2023-11-19 PROCEDURE — 25000003 PHARM REV CODE 250: Performed by: HOSPITALIST

## 2023-11-19 PROCEDURE — 87305 ASPERGILLUS AG IA: CPT | Performed by: INTERNAL MEDICINE

## 2023-11-19 PROCEDURE — 30000890 MISCELLANEOUS SENDOUT TEST, NON-BLOOD: Performed by: INTERNAL MEDICINE

## 2023-11-19 PROCEDURE — 93010 EKG 12-LEAD: ICD-10-PCS | Mod: ,,, | Performed by: GENERAL PRACTICE

## 2023-11-19 PROCEDURE — 93010 ELECTROCARDIOGRAM REPORT: CPT | Mod: ,,, | Performed by: GENERAL PRACTICE

## 2023-11-19 PROCEDURE — 87040 BLOOD CULTURE FOR BACTERIA: CPT | Performed by: INTERNAL MEDICINE

## 2023-11-19 PROCEDURE — 99900031 HC PATIENT EDUCATION (STAT)

## 2023-11-19 PROCEDURE — 63600175 PHARM REV CODE 636 W HCPCS: Performed by: INTERNAL MEDICINE

## 2023-11-19 PROCEDURE — 84100 ASSAY OF PHOSPHORUS: CPT | Performed by: PHYSICAL THERAPY ASSISTANT

## 2023-11-19 PROCEDURE — 94640 AIRWAY INHALATION TREATMENT: CPT

## 2023-11-19 PROCEDURE — 25500020 PHARM REV CODE 255: Performed by: HOSPITALIST

## 2023-11-19 PROCEDURE — 94761 N-INVAS EAR/PLS OXIMETRY MLT: CPT

## 2023-11-19 PROCEDURE — 94799 UNLISTED PULMONARY SVC/PX: CPT

## 2023-11-19 PROCEDURE — 87385 HISTOPLASMA CAPSUL AG IA: CPT | Performed by: INTERNAL MEDICINE

## 2023-11-19 PROCEDURE — 36415 COLL VENOUS BLD VENIPUNCTURE: CPT | Performed by: HOSPITALIST

## 2023-11-19 PROCEDURE — 99233 PR SUBSEQUENT HOSPITAL CARE,LEVL III: ICD-10-PCS | Mod: ,,, | Performed by: INTERNAL MEDICINE

## 2023-11-19 PROCEDURE — 25000242 PHARM REV CODE 250 ALT 637 W/ HCPCS: Performed by: INTERNAL MEDICINE

## 2023-11-19 PROCEDURE — 86038 ANTINUCLEAR ANTIBODIES: CPT | Performed by: INTERNAL MEDICINE

## 2023-11-19 PROCEDURE — 25000003 PHARM REV CODE 250: Performed by: PHYSICAL THERAPY ASSISTANT

## 2023-11-19 PROCEDURE — 82164 ANGIOTENSIN I ENZYME TEST: CPT | Performed by: INTERNAL MEDICINE

## 2023-11-19 PROCEDURE — 82962 GLUCOSE BLOOD TEST: CPT

## 2023-11-19 PROCEDURE — 85025 COMPLETE CBC W/AUTO DIFF WBC: CPT | Performed by: PHYSICAL THERAPY ASSISTANT

## 2023-11-19 PROCEDURE — 21400001 HC TELEMETRY ROOM

## 2023-11-19 PROCEDURE — 63600175 PHARM REV CODE 636 W HCPCS: Performed by: HOSPITALIST

## 2023-11-19 PROCEDURE — 99233 SBSQ HOSP IP/OBS HIGH 50: CPT | Mod: ,,, | Performed by: INTERNAL MEDICINE

## 2023-11-19 PROCEDURE — 86037 ANCA TITER EACH ANTIBODY: CPT | Mod: 59 | Performed by: INTERNAL MEDICINE

## 2023-11-19 PROCEDURE — 25000003 PHARM REV CODE 250: Performed by: INTERNAL MEDICINE

## 2023-11-19 PROCEDURE — 80202 ASSAY OF VANCOMYCIN: CPT | Mod: 91 | Performed by: HOSPITALIST

## 2023-11-19 PROCEDURE — 87389 HIV-1 AG W/HIV-1&-2 AB AG IA: CPT | Performed by: INTERNAL MEDICINE

## 2023-11-19 PROCEDURE — 80053 COMPREHEN METABOLIC PANEL: CPT | Performed by: PHYSICAL THERAPY ASSISTANT

## 2023-11-19 PROCEDURE — 99900035 HC TECH TIME PER 15 MIN (STAT)

## 2023-11-19 PROCEDURE — 36415 COLL VENOUS BLD VENIPUNCTURE: CPT | Performed by: INTERNAL MEDICINE

## 2023-11-19 PROCEDURE — 80202 ASSAY OF VANCOMYCIN: CPT | Performed by: INTERNAL MEDICINE

## 2023-11-19 PROCEDURE — 80170 ASSAY OF GENTAMICIN: CPT | Mod: 91 | Performed by: HOSPITALIST

## 2023-11-19 PROCEDURE — 80170 ASSAY OF GENTAMICIN: CPT | Performed by: HOSPITALIST

## 2023-11-19 PROCEDURE — 87340 HEPATITIS B SURFACE AG IA: CPT | Performed by: INTERNAL MEDICINE

## 2023-11-19 RX ORDER — BUTALBITAL, ACETAMINOPHEN AND CAFFEINE 50; 325; 40 MG/1; MG/1; MG/1
1 TABLET ORAL ONCE
Status: COMPLETED | OUTPATIENT
Start: 2023-11-19 | End: 2023-11-19

## 2023-11-19 RX ADMIN — ZOLPIDEM TARTRATE 10 MG: 5 TABLET, COATED ORAL at 08:11

## 2023-11-19 RX ADMIN — SERTRALINE HYDROCHLORIDE 100 MG: 50 TABLET ORAL at 10:11

## 2023-11-19 RX ADMIN — ENOXAPARIN SODIUM 40 MG: 40 INJECTION SUBCUTANEOUS at 08:11

## 2023-11-19 RX ADMIN — LEVALBUTEROL HYDROCHLORIDE 1.25 MG: 1.25 SOLUTION RESPIRATORY (INHALATION) at 07:11

## 2023-11-19 RX ADMIN — BUSPIRONE HYDROCHLORIDE 7.5 MG: 5 TABLET ORAL at 08:11

## 2023-11-19 RX ADMIN — METOPROLOL SUCCINATE 50 MG: 50 TABLET, EXTENDED RELEASE ORAL at 10:11

## 2023-11-19 RX ADMIN — HYDROCODONE BITARTRATE AND ACETAMINOPHEN 1 TABLET: 5; 325 TABLET ORAL at 05:11

## 2023-11-19 RX ADMIN — GENTAMICIN SULFATE 91.2 MG: 40 INJECTION, SOLUTION INTRAMUSCULAR; INTRAVENOUS at 06:11

## 2023-11-19 RX ADMIN — VANCOMYCIN HYDROCHLORIDE 2000 MG: 500 INJECTION, POWDER, LYOPHILIZED, FOR SOLUTION INTRAVENOUS at 05:11

## 2023-11-19 RX ADMIN — LEVALBUTEROL HYDROCHLORIDE 1.25 MG: 1.25 SOLUTION RESPIRATORY (INHALATION) at 02:11

## 2023-11-19 RX ADMIN — BUSPIRONE HYDROCHLORIDE 7.5 MG: 5 TABLET ORAL at 10:11

## 2023-11-19 RX ADMIN — PANTOPRAZOLE SODIUM 40 MG: 40 TABLET, DELAYED RELEASE ORAL at 10:11

## 2023-11-19 RX ADMIN — BUSPIRONE HYDROCHLORIDE 7.5 MG: 5 TABLET ORAL at 02:11

## 2023-11-19 RX ADMIN — VARENICLINE TARTRATE 0.5 MG: 0.5 TABLET, FILM COATED ORAL at 08:11

## 2023-11-19 RX ADMIN — BUTALBITAL, ACETAMINOPHEN, AND CAFFEINE 1 TABLET: 50; 325; 40 TABLET, COATED ORAL at 08:11

## 2023-11-19 RX ADMIN — IOHEXOL 100 ML: 350 INJECTION, SOLUTION INTRAVENOUS at 09:11

## 2023-11-19 RX ADMIN — PANTOPRAZOLE SODIUM 40 MG: 40 TABLET, DELAYED RELEASE ORAL at 08:11

## 2023-11-19 RX ADMIN — GENTAMICIN SULFATE 91.2 MG: 40 INJECTION, SOLUTION INTRAMUSCULAR; INTRAVENOUS at 02:11

## 2023-11-19 RX ADMIN — SODIUM CHLORIDE: 0.9 INJECTION, SOLUTION INTRAVENOUS at 07:11

## 2023-11-19 RX ADMIN — VANCOMYCIN HYDROCHLORIDE 2000 MG: 5 INJECTION, POWDER, LYOPHILIZED, FOR SOLUTION INTRAVENOUS at 02:11

## 2023-11-19 RX ADMIN — CEFTRIAXONE SODIUM 2 G: 2 INJECTION, POWDER, FOR SOLUTION INTRAMUSCULAR; INTRAVENOUS at 08:11

## 2023-11-19 RX ADMIN — HYDROCODONE BITARTRATE AND ACETAMINOPHEN 1 TABLET: 5; 325 TABLET ORAL at 12:11

## 2023-11-19 RX ADMIN — VARENICLINE TARTRATE 0.5 MG: 0.5 TABLET, FILM COATED ORAL at 10:11

## 2023-11-19 RX ADMIN — ATORVASTATIN CALCIUM 10 MG: 10 TABLET, FILM COATED ORAL at 08:11

## 2023-11-19 RX ADMIN — VORICONAZOLE 600 MG: 10 INJECTION, POWDER, LYOPHILIZED, FOR SOLUTION INTRAVENOUS at 03:11

## 2023-11-19 RX ADMIN — ENOXAPARIN SODIUM 40 MG: 40 INJECTION SUBCUTANEOUS at 10:11

## 2023-11-19 NOTE — PROGRESS NOTES
Pharmacokinetic Assessment Follow Up: IV Vancomycin    Vancomycin serum concentration assessment(s):    The random level was drawn correctly and can be used to guide therapy at this time. The measurement is below the desired definitive target range of 15 to 20 mcg/mL.    Vancomycin Regimen Plan:    Continue regimen to Vancomycin 2000 mg IV every 12 hours with next serum trough concentration measured at 60 min prior to 7 th dose on 11/20/23.    Drug levels (last 3 results):  Recent Labs   Lab Result Units 11/19/23  0140 11/19/23  1100   Vancomycin, Random ug/mL  --  14.2   Vancomycin-Trough ug/mL 26.3*  --        Pharmacy will continue to follow and monitor vancomycin.    Please contact pharmacy at extension [phone] for questions regarding this assessment.    Thank you for the consult,   Joanna Husain       Patient brief summary:  Jacoby Jean-Baptiste is a 48 y.o. male initiated on antimicrobial therapy with IV Vancomycin for treatment of  Septic Embolism.    The patient's current regimen is Vancomycin on hold.    Drug Allergies:   Review of patient's allergies indicates:  No Known Allergies    Actual Body Weight:   132.9 kg    Renal Function:   Estimated Creatinine Clearance: 113.9 mL/min (based on SCr of 1 mg/dL).,     Dialysis Method (if applicable):  N/A    CBC (last 72 hours):  Recent Labs   Lab Result Units 11/17/23  0931 11/18/23  0437 11/18/23  1647 11/19/23  0540   WBC K/uL 12.53 12.80* 14.80* 14.08*   Hemoglobin g/dL 16.1 13.4* 14.0 12.9*   Hematocrit % 46.4 40.5 41.7 38.0*   Platelets K/uL 145* 160 163 196   Gran % % 82.0* 78.0* 77.6* 77.6*   Lymph % % 8.9* 10.5* 8.9* 7.6*   Mono % % 8.5 9.9 10.3 9.9   Eosinophil % % 0.2 0.8 2.4 3.7   Basophil % % 0.2 0.3 0.1 0.1   Differential Method  Automated Automated Automated Automated       Metabolic Panel (last 72 hours):  Recent Labs   Lab Result Units 11/17/23  0931 11/17/23  0952 11/18/23  0437 11/18/23  1647   Sodium mmol/L 136  --  137 136   Potassium mmol/L 4.1  --   4.4 4.2   Chloride mmol/L 102  --  107 105   CO2 mmol/L 26  --  23 26   Glucose mg/dL 111*  --  115* 88   Glucose, UA   --  Negative  --   --    BUN mg/dL 11  --  10 12   Creatinine mg/dL 1.1  --  0.9 1.0   Creatinine, Urine mg/dL  --  227.5  --   --    Albumin g/dL 4.4  --  3.5  --    Total Bilirubin mg/dL 0.6  --  0.5  --    Alkaline Phosphatase U/L 70  --  57  --    AST U/L 25  --  18  --    ALT U/L 27  --  19  --    Magnesium mg/dL 1.3*  --  1.5* 1.5*   Phosphorus mg/dL  --   --  1.9*  --        Vancomycin Administrations:  vancomycin given in the last 96 hours                     vancomycin (VANCOCIN) 2,000 mg in dextrose 5 % (D5W) 500 mL IVPB (mg) 2,000 mg New Bag 11/19/23 0255     2,000 mg New Bag 11/18/23 1804     2,000 mg New Bag  0257    vancomycin (VANCOCIN) 2,000 mg in dextrose 5 % (D5W) 500 mL IVPB ()  Restarted 11/17/23 1500                    Microbiologic Results:  Microbiology Results (last 7 days)       Procedure Component Value Units Date/Time    Blood culture [[id number]] Collected: 11/19/23 0539    Order Status: Completed Specimen: Blood Updated: 11/19/23 1317     Blood Culture, Routine No Growth to date    Culture, Respiratory with Gram Stain [[id number]]     Order Status: Sent Specimen: Respiratory from Sputum, Induced     AFB Culture & Smear [[id number]]     Order Status: Sent Specimen: Sputum, Induced     Blood culture [[id number]] Collected: 11/18/23 1014    Order Status: Completed Specimen: Blood Updated: 11/19/23 1032     Blood Culture, Routine No Growth to date      No Growth to date    Cryptococcal antigen [[id number]] Collected: 11/19/23 0539    Order Status: Sent Specimen: Blood, Venous Updated: 11/19/23 0616    Stool culture [[id number]]     Order Status: No result Specimen: Stool     Blood culture x two cultures. Draw prior to antibiotics. [[id number]] Collected: 11/17/23 1257    Order Status: Completed Specimen: Blood from Antecubital, Right Arm Updated: 11/18/23 1432     Blood  Culture, Routine No Growth to date      No Growth to date    Narrative:      Aerobic and anaerobic    Culture, Respiratory with Gram Stain [[id number]]     Order Status: Canceled Specimen: Respiratory     AFB Culture & Smear [[id number]]     Order Status: Canceled Specimen: Sputum     Blood culture x two cultures. Draw prior to antibiotics. [[id number]]     Order Status: Canceled Specimen: Blood

## 2023-11-19 NOTE — PROGRESS NOTES
Therapy with Gentamicin order discontinued by Dr. Humphrey on 11/19/23 @ 17:20.   Pharmacy will sign off, please re-consult as needed.    Thank you for allowing us to participate in this patient's care.  Joanna Husain 11/19/2023 5:21 PM  Dept of Pharmacy  Ext [phone]

## 2023-11-19 NOTE — PROGRESS NOTES
Pharmacokinetic Assessment Follow Up: IV Vancomycin    Patient brief summary:  Jacoby Jean-Baptiste is a 48 y.o. male initiated on antimicrobial therapy with IV Vancomycin for treatment of Septic embolism    The patient's current regimen is Vancomycin 2000 mg every 12 hours.       Actual Body Weight = 132.9 kg (292 lb 15.9 oz).    Renal Function:   Estimated Creatinine Clearance: 113.9 mL/min (based on SCr of 1 mg/dL).      Vancomycin serum concentration assessment(s):    The trough level was drawn correctly and can be used to guide therapy at this time. The measurement is above the desired definitive target range of 15 to 20 mcg/mL.    Vancomycin Regimen Plan:    Hold Vancomycin regimen. Re-dose when the random level is less than 20 mcg/mL, next level to be drawn at 11:00 on 11/19    Drug levels (last 3 results):  Recent Labs   Lab Result Units 11/19/23  0140   Vancomycin-Trough ug/mL 26.3*       Pharmacy will continue to follow and monitor vancomycin.    Please contact pharmacy at extension [phone] for questions regarding this assessment.    Thank you for the consult,   Jessica Augilar

## 2023-11-19 NOTE — CARE UPDATE
11/19/23 0000   Patient Assessment/Suction   Level of Consciousness (AVPU) alert   Respiratory Effort Unlabored   Expansion/Accessory Muscles/Retractions no use of accessory muscles   Rhythm/Pattern, Respiratory no shortness of breath reported;unlabored   PRE-TX-O2   Device (Oxygen Therapy) room air   Positioning   Head of Bed (HOB) Positioning HOB lowered   Aerosol Therapy   $ Aerosol Therapy Charges Refused   Daily Review of Necessity (SVN) completed   Respiratory Treatment Status (SVN) refused;other (see comments)  (pt not willing to be disturbed during sleep.)   Respiratory Evaluation   $ Care Plan Tech Time 15 min   $ Eval/Re-eval Charges Re-evaluation   Evaluation For New Orders

## 2023-11-19 NOTE — PROGRESS NOTES
[facility] Medicine  Progress Note    Patient Name: Jacoby Jean-Baptiste  MRN: [id number]  Patient Class: IP- Inpatient   Admission Date: 11/17/2023  Length of Stay: 2 days  Attending Physician: Мария Durham MD  Primary Care Provider: Hunter Aguilar III, MD        Subjective:     Principal Problem:Septic embolism        HPI:  Pt is a 48 year old male with a PMH of DM2, anemia, tobacco use, HLD, and anxiety. Pt presents to ED today after complaints of SOB, chest pain, nausea/vomiting, and abdominal pain x1 week. Pt states he has had progressively worsening symptoms and unable to find relief on own over the last week. Pt has history of colitis, CT of abdomen shows potential mild pyelonephritis. Pt does not have CVA tenderness on exam, creatinine 1.1, and UA not diagnostic. Lipase 8, negative flu/covid, and WBC 12.53. CTA of chest shows no evidence of PE but does have L lower lobe suspicion for septic emboli and/or necrosis. Pt has SOB and requires 2L O2 to keep >90%. Pt given nebulizer treatment in ED and has improvement with SOB. Pt on exam states he is feeling better and most symptoms have diminished including chest pain, SOB, nausea, and abdominal pain. Pt started on antibiotics in ED but pt refused due to hx of cdiff and GI giving him antibiotic free window to abide by. Discussed risk vs benefits with new onset sepsis. Cardiology consulted and echo pending. Pt states he stopped smoking approximately 1 week ago, denies alcohol use, and illicit drug use. Full code.     Overview/Hospital Course:  No notes on file    Interval History:  Complains of a new severe sharp headache of the right side of his temporal region which started this morning.  No associated vision changes, dizziness, lightheadedness, palpitations, chest pain.  Continues to have productive cough unchanged.  Denies shortness or breath.    Review of Systems Complete ROS otherwise negative other than stated in HPI.   Objective:      Vital Signs (Most Recent):  Temp: 97.2 °F (36.2 °C) (11/19/23 1121)  Pulse: 84 (11/19/23 1121)  Resp: 20 (11/19/23 1121)  BP: 139/75 (11/19/23 1121)  SpO2: (!) 94 % (11/19/23 1121) Vital Signs (24h Range):  Temp:  [97.2 °F (36.2 °C)-98.8 °F (37.1 °C)] 97.2 °F (36.2 °C)  Pulse:  [] 84  Resp:  [16-20] 20  SpO2:  [92 %-96 %] 94 %  BP: (139-165)/(72-82) 139/75     Weight: 132.9 kg (292 lb 15.9 oz)  Body mass index is 48.76 kg/m².    Intake/Output Summary (Last 24 hours) at 11/19/2023 1142  Last data filed at 11/19/2023 1121  Gross per 24 hour   Intake 1920 ml   Output 0 ml   Net 1920 ml         Physical Exam  GENERAL:  Alert and oriented x 3  HEENT:  EOMI. Conjunctivae intact. Posterior pharynx clear, oral mucosa moist  NECK:  Supple   LUNGS:  No respiratory distress. Clear to auscultation bilaterally with good air movement  CARDIAC:  RRR without murmur, rub or gallop  ABDOMEN:  Soft,  Nontender and nondistended, no rebound or guarding, bowel sounds present   EXTREMITIES:  Peripheral pulses are 2+. Hands and feet are warm. Good capillary refill in fingers (< 2 seconds). No clubbing, cyanosis or edema  SKIN:  Skin color, texture and turgor normal. No rashes, ulcerations or nodules noted  NEUROLOGIC:  CN II-XII intact. Light touch sensation intact. Muscle strength 5/5 in all extremities          Significant Labs: All pertinent labs within the past 24 hours have been reviewed.  Blood Culture:   Recent Labs   Lab 11/17/23  1257 11/18/23  1014   LABBLOO No Growth to date  No Growth to date No Growth to date  No Growth to date     BMP:   Recent Labs   Lab 11/18/23  1647   GLU 88      K 4.2      CO2 26   BUN 12   CREATININE 1.0   CALCIUM 8.4*   MG 1.5*     CBC:   Recent Labs   Lab 11/18/23  0437 11/18/23  1647 11/19/23  0540   WBC 12.80* 14.80* 14.08*   HGB 13.4* 14.0 12.9*   HCT 40.5 41.7 38.0*    163 196     CMP:   Recent Labs   Lab 11/18/23  0437 11/18/23  1647    136   K 4.4 4.2   CL  107 105   CO2 23 26   * 88   BUN 10 12   CREATININE 0.9 1.0   CALCIUM 8.4* 8.4*   PROT 6.8  --    ALBUMIN 3.5  --    BILITOT 0.5  --    ALKPHOS 57  --    AST 18  --    ALT 19  --    ANIONGAP 7* 5*       Significant Imaging: I have reviewed all pertinent imaging results/findings within the past 24 hours.    Assessment/Plan:        * Septic embolism  CTA of chest shows septic emboli and/or necrosis   Discontinue IV fluids.  Follow up blood cultures, negative so far  ID following, continue antibiotics per ID  Leukocytosis increasing.  Has remained afebrile and on room air.  Pulmonary has been consulted for possible bronchoscopy  Uday planned tomorrow by Cardiology  He is having new onset headache, check CT head with and without contrast to rule out septic emboli       Chest pain  Resolved.  cont telemetry monitoring      Shortness of breath  O2 PRN to keep O2 >90%  Nebulizer treatments   Continuous pulse oximetry         Hypomagnesemia  Trend electrolytes and replete PRN        Anxiety  Continue home regimen      Type 2 diabetes mellitus without complication, without long-term current use of insulin  POCT glucose   Insulin sliding scale .  Controlled     Hyperlipidemia   Continue statin         Tobacco use  Pt states he quit smoking last Saturday   Denies nicotine patch     VTE Risk Mitigation (From admission, onward)           Ordered     enoxaparin injection 40 mg  Every 12 hours         11/18/23 1036     IP VTE HIGH RISK PATIENT  Once         11/17/23 1308     Place sequential compression device  Until discontinued         11/17/23 1308                    Discharge Planning   GERARDO:      Code Status: Full Code   Is the patient medically ready for discharge?:     Reason for patient still in hospital (select all that apply): Patient trending condition and Treatment  Discharge Plan A: Home                  Мария Durham MD  Department of Hospital Medicine   [facility]

## 2023-11-19 NOTE — SUBJECTIVE & OBJECTIVE
Interval History:  Complains of a new severe sharp headache of the right side of his temporal region which started this morning.  No associated vision changes, dizziness, lightheadedness, palpitations, chest pain.  Continues to have productive cough unchanged.  Denies shortness or breath.    Review of Systems Complete ROS otherwise negative other than stated in HPI.   Objective:     Vital Signs (Most Recent):  Temp: 97.2 °F (36.2 °C) (11/19/23 1121)  Pulse: 84 (11/19/23 1121)  Resp: 20 (11/19/23 1121)  BP: 139/75 (11/19/23 1121)  SpO2: (!) 94 % (11/19/23 1121) Vital Signs (24h Range):  Temp:  [97.2 °F (36.2 °C)-98.8 °F (37.1 °C)] 97.2 °F (36.2 °C)  Pulse:  [] 84  Resp:  [16-20] 20  SpO2:  [92 %-96 %] 94 %  BP: (139-165)/(72-82) 139/75     Weight: 132.9 kg (292 lb 15.9 oz)  Body mass index is 48.76 kg/m².    Intake/Output Summary (Last 24 hours) at 11/19/2023 1142  Last data filed at 11/19/2023 1121  Gross per 24 hour   Intake 1920 ml   Output 0 ml   Net 1920 ml         Physical Exam  GENERAL:  Alert and oriented x 3  HEENT:  EOMI. Conjunctivae intact. Posterior pharynx clear, oral mucosa moist  NECK:  Supple   LUNGS:  No respiratory distress. Clear to auscultation bilaterally with good air movement  CARDIAC:  RRR without murmur, rub or gallop  ABDOMEN:  Soft,  Nontender and nondistended, no rebound or guarding, bowel sounds present   EXTREMITIES:  Peripheral pulses are 2+. Hands and feet are warm. Good capillary refill in fingers (< 2 seconds). No clubbing, cyanosis or edema  SKIN:  Skin color, texture and turgor normal. No rashes, ulcerations or nodules noted  NEUROLOGIC:  CN II-XII intact. Light touch sensation intact. Muscle strength 5/5 in all extremities          Significant Labs: All pertinent labs within the past 24 hours have been reviewed.  Blood Culture:   Recent Labs   Lab 11/17/23  1257 11/18/23  1014   LABBLOO No Growth to date  No Growth to date No Growth to date  No Growth to date     BMP:    Recent Labs   Lab 11/18/23  1647   GLU 88      K 4.2      CO2 26   BUN 12   CREATININE 1.0   CALCIUM 8.4*   MG 1.5*     CBC:   Recent Labs   Lab 11/18/23  0437 11/18/23  1647 11/19/23  0540   WBC 12.80* 14.80* 14.08*   HGB 13.4* 14.0 12.9*   HCT 40.5 41.7 38.0*    163 196     CMP:   Recent Labs   Lab 11/18/23 0437 11/18/23  1647    136   K 4.4 4.2    105   CO2 23 26   * 88   BUN 10 12   CREATININE 0.9 1.0   CALCIUM 8.4* 8.4*   PROT 6.8  --    ALBUMIN 3.5  --    BILITOT 0.5  --    ALKPHOS 57  --    AST 18  --    ALT 19  --    ANIONGAP 7* 5*       Significant Imaging: I have reviewed all pertinent imaging results/findings within the past 24 hours.

## 2023-11-19 NOTE — PLAN OF CARE
Problem: Adult Inpatient Plan of Care  Goal: Plan of Care Review  Outcome: Ongoing, Progressing  Goal: Patient-Specific Goal (Individualized)  Outcome: Ongoing, Progressing  Goal: Absence of Hospital-Acquired Illness or Injury  Outcome: Ongoing, Progressing  Goal: Optimal Comfort and Wellbeing  Outcome: Ongoing, Progressing  Goal: Readiness for Transition of Care  Outcome: Ongoing, Progressing     Problem: Bariatric Environmental Safety  Goal: Safety Maintained with Care  Outcome: Ongoing, Progressing     Problem: Diabetes Comorbidity  Goal: Blood Glucose Level Within Targeted Range  Outcome: Ongoing, Progressing     Problem: Infection  Goal: Absence of Infection Signs and Symptoms  Outcome: Ongoing, Progressing     Problem: Neutropenia  Goal: Absence of Infection  Outcome: Ongoing, Progressing

## 2023-11-19 NOTE — PLAN OF CARE
11/19/23 0746   Patient Assessment/Suction   Level of Consciousness (AVPU) alert   Respiratory Effort Unlabored   All Lung Fields Breath Sounds diminished   PRE-TX-O2   Device (Oxygen Therapy) room air   SpO2 (!) 92 %   Pulse Oximetry Type Intermittent   $ Pulse Oximetry - Multiple Charge Pulse Oximetry - Multiple   Pulse 101   Resp 18   Aerosol Therapy   $ Aerosol Therapy Charges Aerosol Treatment   Respiratory Treatment Status (SVN) given   Treatment Route (SVN) mask   Patient Position (SVN) sitting in chair   Post Treatment Assessment (SVN) breath sounds unchanged   Signs of Intolerance (SVN) none   Breath Sounds Post-Respiratory Treatment   Post-treatment Heart Rate (beats/min) 98   Post-treatment Resp Rate (breaths/min) 18   Education   $ Education 15 min;Bronchodilator   Respiratory Evaluation   $ Care Plan Tech Time 15 min

## 2023-11-19 NOTE — PROGRESS NOTES
Consult Note  Infectious Disease    Reason for Consult:      HPI: Jacoby Jean-Baptiste is a 48 y.o. male with PMHx of DM 2, PSVT, status post ablation in 2017, HTN, DLP, anemia, smoker, anxiety, insomnia, hepatitis B positive serology, GERD, C diff colitis in March 2023, ulcerative colitis follows with Dr. Jean on Remicade infusion (last 6 weeks ago)came in complaining feeling poorly, like he had the flu, x1 week duration.  Then he became short of breath and had retrosternal chest pain- sharp, going towards his back, associated with nausea vomiting and some abdominal discomfort.  He ruled out for flu and COVID.  WBC 12 , , procalcitonin 17, multiple electrolytes were low, low phosphorus, low magnesium.  CT chest ruled out PE, but found  left lower lobe septic emboli and/or necrosis.    CT abdomen with possible pyelonephritis. Urine with only 3 WBC and 5 RBCs.    He is feeling more comfortable now but he seems scared and gives limited history.  He has to be asked specifically, otherwise does not offer information.  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    He has baseline loose stools  No risk factors for tuberculosis , TB gold negative in April 23  No travel   No specific exposures, no cats no dogs no pets of any kind.  He has trace around with birds but he does not feet them or any other close exposures.  [place] in [place]   11/19/2023.  Afebrile.  Same cough with yellow thick phlegm, 2-3 tbsp a day;  Drug screen was positive for marijuana and opiates.  Patient states he had some  cough medicine  5 days ago.  He denies using IV drugs whatsoever.    I had extensively asked about tuberculosis history but I discussed again with patient.  He was in residential for 3 years, about 11 years ago, PPD was negative  at the time and TB gold was negative this year.  Given the information of prior imprisonment I decided to put patient on respiratory isolation.  He had some right-sided sharp headaches earlier today and was sent for a CT head which turned up to be negative for anything acute.  There was some mild fluid in the mastoid but there is no pain over mastoid on physical exam.  Patient has absolutely no phonophobia, no photophobia, no nuchal rigidity.  I had ordered sputum cultures, sputum AFB, sputum G MS, that was not collected.  I changed the ordered today to sputum with induction.    Antibiotics (From admission, onward)      Start     Stop Route Frequency Ordered    11/17/23 1430  gentamicin (GARAMYCIN) 91.2 mg in sodium chloride 0.9% 100 mL IVPB         -- IV Every 8 hours (non-standard times) 11/17/23 1330    11/17/23 1407  gentamicin - pharmacy to dose  (gentamicin IVPB (PEDS and ADULTS))        See Hyperspace for full Linked Orders Report.    -- IV pharmacy to manage frequency 11/17/23 1308    11/17/23 1345  vancomycin (VANCOCIN) 2,000 mg in dextrose 5 % (D5W) 500 mL IVPB         -- IV Once 11/17/23 1233    11/17/23 1331  vancomycin - pharmacy to dose  (vancomycin IVPB (PEDS and ADULTS))        See Hyperspace for full Linked Orders Report.    -- IV pharmacy to manage frequency 11/17/23 1231          Antifungals (From admission, onward)      None          Antivirals (From admission, onward)      None              Review of patient's allergies indicates:  No Known Allergies  Past Medical History:   Diagnosis Date    Diabetes mellitus 01/2022    Hyperlipidemia 2015    Hypertension 2015    Insomnia 2015     Past Surgical History:   Procedure Laterality Date    CARDIAC ELECTROPHYSIOLOGY MAPPING AND ABLATION  2017    SVT    CHOLECYSTECTOMY  2011    COLONOSCOPY N/A 5/3/2022    Procedure: COLONOSCOPY;  Surgeon: Shan Jean III, MD;  Location: [facility];  Service: Endoscopy;  Laterality: N/A;    COLONOSCOPY WITH FECAL  MICROBIOTA TRANSFER N/A 3/21/2023    Procedure: COLONOSCOPY, WITH FECAL MICROBIOTA TRANSFER;  Surgeon: David Millan MD;  Location: [facility] ENDO;  Service: Endoscopy;  Laterality: N/A;    ESOPHAGOGASTRODUODENOSCOPY N/A 4/24/2023    Procedure: EGD (ESOPHAGOGASTRODUODENOSCOPY);  Surgeon: Shan Jean III, MD;  Location: [facility] ENDO;  Service: Endoscopy;  Laterality: N/A;    GANGLION CYST EXCISION Left 1992    UMBILICAL HERNIA REPAIR  2011    with mesh     Social History     Socioeconomic History    Marital status: Single   Tobacco Use    Smoking status: Every Day     Current packs/day: 1.00     Average packs/day: 1 pack/day for 30.0 years (30.0 ttl pk-yrs)     Types: Cigarettes    Smokeless tobacco: Never    Tobacco comments:     DO NOT SMOKE DOS   Substance and Sexual Activity    Alcohol use: Yes     Comment: ocass    Drug use: Not Currently    Sexual activity: Not Currently     Social Determinants of Health     Financial Resource Strain: Low Risk  (4/21/2023)    Overall Financial Resource Strain (CARDIA)     Difficulty of Paying Living Expenses: Not very hard   Food Insecurity: No Food Insecurity (4/21/2023)    Hunger Vital Sign     Worried About Running Out of Food in the Last Year: Never true     Ran Out of Food in the Last Year: Never true   Transportation Needs: No Transportation Needs (4/21/2023)    PRAPARE - Transportation     Lack of Transportation (Medical): No     Lack of Transportation (Non-Medical): No   Physical Activity: Inactive (4/21/2023)    Exercise Vital Sign     Days of Exercise per Week: 0 days     Minutes of Exercise per Session: 0 min   Stress: No Stress Concern Present (4/21/2023)    [employer] - Occupational Stress Questionnaire     Feeling of Stress : Only a little   Social Connections: Socially Isolated (4/21/2023)    Social Connection and Isolation Panel [NHANES]     Frequency of Communication with Friends and Family: Twice a week     Frequency of Social  Gatherings with Friends and Family: Twice a week     Attends Worship Services: Never     Active Member of Clubs or Organizations: No     Attends Club or Organization Meetings: Never     Marital Status: Never    Housing Stability: Low Risk  (4/21/2023)    Housing Stability Vital Sign     Unable to Pay for Housing in the Last Year: No     Number of Places Lived in the Last Year: 1     Unstable Housing in the Last Year: No     Family History   Problem Relation Age of Onset    Asthma Mother          Review of Systems:   He denies fever chills but he felt poorly like he had the flu  No change in vision, loss of vision or diplopia  No sinus congestion, purulent nasal discharge, post nasal drip or facial pain  No pain in mouth or throat. No problems with teeth, gums.  Chest pain her, retrosternal, sharp, going towards the back.  Associated with cough and yellow phlegm.    No nausea, vomiting, constipation, blood in stool, or focal abd pain,  No dysphagia, odynophagia  He has baseline diarrhea due to UC,  No dysuria, hematuria, strangury, retention, incontinence, nocturia, prostatism,   No penile discharge, genital ulcers, risk for STD  No swelling of joints, redness of joints, injuries, or new focal pain  No unusual headaches, dizziness, vertigo, numbness, paresthesias, neuropathy, falls  No anxiety, depression, substance abuse, sleep disturbance  Positive for thyroid  No bleeding, lymphadenopathy, anemia, malignancy, unusual bruising  No new rashes, lesions, or wounds  No TB exposure  No recent/prior steroids  Outdoor activities:  Travel:   Implants:   Antibiotic History:     EXAM & DIAGNOSTICS REVIEWED:   Vitals:     Temp:  [97.4 °F (36.3 °C)-98.8 °F (37.1 °C)]   Temp: 98.3 °F (36.8 °C) (11/19/23 0724)  Pulse: 101 (11/19/23 0746)  Resp: 18 (11/19/23 0746)  BP: (!) 144/74 (11/19/23 0724)  SpO2: (!) 92 % (11/19/23 0746)    Intake/Output Summary (Last 24 hours) at 11/19/2023 1107  Last data filed at 11/19/2023  0357  Gross per 24 hour   Intake 1560 ml   Output 0 ml   Net 1560 ml       General:  In NAD. Alert and attentive, cooperative, comfortable  Eyes:  Anicteric, PERRL, EOMI  ENT:  No ulcers, exudates, thrush, nares patent, dentition is  Neck:  supple, no masses or adenopathy appreciated  Lungs: Clear, no consolidation, rales, wheezes, rub  Heart:  RRR, no gallop/murmur/rub noted  Abd:  Soft, NT, ND, normal BS, no masses or organomegaly appreciated.  :  Voids/[place], urine clear, no flank tenderness  Musc:  Joints without effusion, swelling, erythema, synovitis, muscle wasting.   Skin:  No rashes. No palmar or plantar lesions. No subungual petechiae  Neuro:             Alert, attentive, speech fluent, face symmetric, moves all extremities, no focal weakness. Ambulatory  Psych: Calm, cooperative. Flat affect Pleasant but gives minimal history and only if specifically asked.  Otherwise he does not offer information.  Lymphatic:     No cervical, supraclavicular, axillary, or inguinal nodes  Extrem: No edema, erythema, phlebitis, cellulitis, warm and well perfused  VAD:       Isolation:    Wound:       Lines/Tubes/Drains:    General Labs reviewed:  Recent Labs   Lab 11/18/23  0437 11/18/23  1647 11/19/23  0540   WBC 12.80* 14.80* 14.08*   HGB 13.4* 14.0 12.9*   HCT 40.5 41.7 38.0*    163 196       Recent Labs   Lab 11/17/23  0931 11/18/23  0437 11/18/23  1647    137 136   K 4.1 4.4 4.2    107 105   CO2 26 23 26   BUN 11 10 12   CREATININE 1.1 0.9 1.0   CALCIUM 9.4 8.4* 8.4*   PROT 8.4 6.8  --    BILITOT 0.6 0.5  --    ALKPHOS 70 57  --    ALT 27 19  --    AST 25 18  --      Recent Labs   Lab 11/17/23  1352   .2*         Micro:  Microbiology Results (last 7 days)       Procedure Component Value Units Date/Time    Blood culture [[id number]] Collected: 11/18/23 1014    Order Status: Completed Specimen: Blood Updated: 11/19/23 1032     Blood Culture, Routine No Growth to date      No Growth to date     Cryptococcal antigen [[id number]] Collected: 11/19/23 0539    Order Status: Sent Specimen: Blood, Venous Updated: 11/19/23 0616    Blood culture [[id number]] Collected: 11/19/23 0539    Order Status: Sent Specimen: Blood Updated: 11/19/23 0616    Stool culture [[id number]]     Order Status: No result Specimen: Stool     Blood culture x two cultures. Draw prior to antibiotics. [[id number]] Collected: 11/17/23 1257    Order Status: Completed Specimen: Blood from Antecubital, Right Arm Updated: 11/18/23 1432     Blood Culture, Routine No Growth to date      No Growth to date    Narrative:      Aerobic and anaerobic    Culture, Respiratory with Gram Stain [[id number]]     Order Status: No result Specimen: Respiratory     AFB Culture & Smear [[id number]]     Order Status: Sent Specimen: Sputum     Blood culture x two cultures. Draw prior to antibiotics. [[id number]]     Order Status: Canceled Specimen: Blood             Imaging Reviewed:  CTA Negative for intracranial aneurysm, vascular malformation, or arterial occlusion.     CT head  No CT evidence of acute intracranial pathology.  Small right mastoid effusion.    11/17/2023 CT chest CT abdomen   1. No evidence of pulmonary embolism to the central main arterial level. If there is continued clinical concern, consider further evaluation with lower extremity doppler or repeat imaging.   2. Scattered ill-defined nodular densities in the lungs, most noticeably in the left lower lobe, some of which show central cavitation suspicious for septic emboli and/or necrosis. Consider correlation with the symptoms, history and possible echocardiogram.   3. Faint striations in the renal nephrograms, possibly secondary to phase of contrast or mild pyelonephritis, consider correlation with urinalysis.   4. Small bilateral nonobstructing renal stones.   5. Hepatomegaly background parenchyma findings a steatosis.   6. Prior cholecystectomy with no intra-/extrahepatic biliary ductal  dilation.     Cardiology:  11/17/2023 EC HO     Left Ventricle: The left ventricle is normal in size. Normal wall thickness. There is concentric remodeling. Normal wall motion. There is normal systolic function with a visually estimated ejection fraction of 55 - 60%. There is normal diastolic function.    Right Ventricle: Normal right ventricular cavity size. Wall thickness is normal. Right ventricle wall motion  is normal. Systolic function is normal.    IVC/SVC: Normal venous pressure at 3 mmHg.      IMPRESSION & PLAN   Reason for admission chest pain, resolved    Reason for ID consult: nodular lung densities, left lower lung nodule  is positive for central necrosis  Procalcitonin 17       Immunocompromised, on Remicade for UC.    Dental caries     History of C diff colitis status post fecal microbiology transplant , in March and in September-October 2023.    Right-sided headache today a.m., resolved, CTA negative.      PMHx of DM 2, PSVT, status post ablation in 2017, HTN, DLP, anemia, smoker, anxiety, insomnia, hepatitis B positive serology, GERD, ulcerative colitis      Recommendations:  - Continue vancomycin   -- Discontinue gentamicin and give ceftriaxone instead.  -- I added voriconazole today,  until I have more results.  (See below work up)    Blood cultures daily x3   KATJA soon --discussed with cardiologist  Monitor markers of inflammation   Sputum culture   Sputum AFB x3   Sputum GS  TB gold, although it was checked recently and it was negative.   Workup to rule out fungal etiology, urine histo, urine blasto, serum Aspergillus antigen, serum cryptococcal antigen   , Anca, ACE  HIV screen, although it was checked within a year and was negative.  Pulmonologist evaluation, for possible bronchoscopy         Medical Decision Making during this encounter was  [_] Low Complexity  [_] Moderate Complexity  [  xx] High Complexity

## 2023-11-20 ENCOUNTER — ANESTHESIA EVENT (OUTPATIENT)
Dept: SURGERY | Facility: HOSPITAL | Age: 48
DRG: 871 | End: 2023-11-20
Payer: COMMERCIAL

## 2023-11-20 ENCOUNTER — ANESTHESIA (OUTPATIENT)
Dept: SURGERY | Facility: HOSPITAL | Age: 48
DRG: 871 | End: 2023-11-20
Payer: COMMERCIAL

## 2023-11-20 LAB
ADENOVIRUS: NOT DETECTED
ALBUMIN SERPL BCP-MCNC: 3 G/DL (ref 3.5–5.2)
ALBUMIN SERPL BCP-MCNC: 3.5 G/DL (ref 3.5–5.2)
ALP SERPL-CCNC: 55 U/L (ref 55–135)
ALP SERPL-CCNC: 58 U/L (ref 55–135)
ALT SERPL W/O P-5'-P-CCNC: 18 U/L (ref 10–44)
ALT SERPL W/O P-5'-P-CCNC: 20 U/L (ref 10–44)
ANION GAP SERPL CALC-SCNC: 11 MMOL/L (ref 8–16)
ANION GAP SERPL CALC-SCNC: 6 MMOL/L (ref 8–16)
AST SERPL-CCNC: 18 U/L (ref 10–40)
AST SERPL-CCNC: 31 U/L (ref 10–40)
BASOPHILS # BLD AUTO: 0.04 K/UL (ref 0–0.2)
BASOPHILS NFR BLD: 0.3 % (ref 0–1.9)
BILIRUB SERPL-MCNC: 0.3 MG/DL (ref 0.1–1)
BILIRUB SERPL-MCNC: 0.6 MG/DL (ref 0.1–1)
BORDETELLA PARAPERTUSSIS (IS1001): NOT DETECTED
BORDETELLA PERTUSSIS (PTXP): NOT DETECTED
BUN SERPL-MCNC: 11 MG/DL (ref 6–20)
BUN SERPL-MCNC: 14 MG/DL (ref 6–20)
CALCIUM SERPL-MCNC: 7.8 MG/DL (ref 8.7–10.5)
CALCIUM SERPL-MCNC: 8.2 MG/DL (ref 8.7–10.5)
CHLAMYDIA PNEUMONIAE: NOT DETECTED
CHLORIDE SERPL-SCNC: 103 MMOL/L (ref 95–110)
CHLORIDE SERPL-SCNC: 105 MMOL/L (ref 95–110)
CO2 SERPL-SCNC: 21 MMOL/L (ref 23–29)
CO2 SERPL-SCNC: 26 MMOL/L (ref 23–29)
CORONAVIRUS 229E, COMMON COLD VIRUS: NOT DETECTED
CORONAVIRUS HKU1, COMMON COLD VIRUS: NOT DETECTED
CORONAVIRUS NL63, COMMON COLD VIRUS: NOT DETECTED
CORONAVIRUS OC43, COMMON COLD VIRUS: NOT DETECTED
CREAT SERPL-MCNC: 0.9 MG/DL (ref 0.5–1.4)
CREAT SERPL-MCNC: 1.1 MG/DL (ref 0.5–1.4)
CRP SERPL-MCNC: 265.5 MG/L (ref 0–8.2)
DIFFERENTIAL METHOD: ABNORMAL
EOSINOPHIL # BLD AUTO: 0.9 K/UL (ref 0–0.5)
EOSINOPHIL NFR BLD: 6.9 % (ref 0–8)
ERYTHROCYTE [DISTWIDTH] IN BLOOD BY AUTOMATED COUNT: 14 % (ref 11.5–14.5)
EST. GFR  (NO RACE VARIABLE): >60 ML/MIN/1.73 M^2
EST. GFR  (NO RACE VARIABLE): >60 ML/MIN/1.73 M^2
FLUBV RNA NPH QL NAA+NON-PROBE: NOT DETECTED
GLUCOSE SERPL-MCNC: 100 MG/DL (ref 70–110)
GLUCOSE SERPL-MCNC: 106 MG/DL (ref 70–110)
GLUCOSE SERPL-MCNC: 112 MG/DL (ref 70–110)
GLUCOSE SERPL-MCNC: 82 MG/DL (ref 70–110)
GLUCOSE SERPL-MCNC: 88 MG/DL (ref 70–110)
GLUCOSE SERPL-MCNC: 94 MG/DL (ref 70–110)
GLUCOSE SERPL-MCNC: 96 MG/DL (ref 70–110)
GLUCOSE SERPL-MCNC: 99 MG/DL (ref 70–110)
HCT VFR BLD AUTO: 36.4 % (ref 40–54)
HGB BLD-MCNC: 12.1 G/DL (ref 14–18)
HPIV1 RNA NPH QL NAA+NON-PROBE: NOT DETECTED
HPIV2 RNA NPH QL NAA+NON-PROBE: NOT DETECTED
HPIV3 RNA NPH QL NAA+NON-PROBE: NOT DETECTED
HPIV4 RNA NPH QL NAA+NON-PROBE: NOT DETECTED
HUMAN METAPNEUMOVIRUS: NOT DETECTED
IMM GRANULOCYTES # BLD AUTO: 0.11 K/UL (ref 0–0.04)
IMM GRANULOCYTES NFR BLD AUTO: 0.9 % (ref 0–0.5)
INFLUENZA A (SUBTYPES H1,H1-2009,H3): NOT DETECTED
KOH PREP SPEC: NORMAL
KOH PREP SPEC: NORMAL
LDH SERPL L TO P-CCNC: 192 U/L (ref 110–260)
LYMPHOCYTES # BLD AUTO: 1.2 K/UL (ref 1–4.8)
LYMPHOCYTES NFR BLD: 9 % (ref 18–48)
MAGNESIUM SERPL-MCNC: 1.3 MG/DL (ref 1.6–2.6)
MAGNESIUM SERPL-MCNC: 1.3 MG/DL (ref 1.6–2.6)
MCH RBC QN AUTO: 33.7 PG (ref 27–31)
MCHC RBC AUTO-ENTMCNC: 33.2 G/DL (ref 32–36)
MCV RBC AUTO: 101 FL (ref 82–98)
MONOCYTES # BLD AUTO: 1.3 K/UL (ref 0.3–1)
MONOCYTES NFR BLD: 10.3 % (ref 4–15)
MRSA SCREEN BY PCR: NEGATIVE
MYCOPLASMA PNEUMONIAE: NOT DETECTED
NEUTROPHILS # BLD AUTO: 9.3 K/UL (ref 1.8–7.7)
NEUTROPHILS NFR BLD: 72.6 % (ref 38–73)
NRBC BLD-RTO: 0 /100 WBC
PHOSPHATE SERPL-MCNC: 1.4 MG/DL (ref 2.7–4.5)
PHOSPHATE SERPL-MCNC: 2.4 MG/DL (ref 2.7–4.5)
PLATELET # BLD AUTO: 221 K/UL (ref 150–450)
PMV BLD AUTO: 10.9 FL (ref 9.2–12.9)
POTASSIUM SERPL-SCNC: 3.7 MMOL/L (ref 3.5–5.1)
POTASSIUM SERPL-SCNC: 4 MMOL/L (ref 3.5–5.1)
PROCALCITONIN SERPL IA-MCNC: 12.21 NG/ML (ref 0–0.5)
PROT SERPL-MCNC: 6.4 G/DL (ref 6–8.4)
PROT SERPL-MCNC: 7.2 G/DL (ref 6–8.4)
RBC # BLD AUTO: 3.59 M/UL (ref 4.6–6.2)
RESPIRATORY INFECTION PANEL SOURCE: NORMAL
RSV RNA NPH QL NAA+NON-PROBE: NOT DETECTED
RV+EV RNA NPH QL NAA+NON-PROBE: NOT DETECTED
SARS-COV-2 RNA RESP QL NAA+PROBE: NOT DETECTED
SODIUM SERPL-SCNC: 135 MMOL/L (ref 136–145)
SODIUM SERPL-SCNC: 137 MMOL/L (ref 136–145)
WBC # BLD AUTO: 12.86 K/UL (ref 3.9–12.7)

## 2023-11-20 PROCEDURE — 25000003 PHARM REV CODE 250: Performed by: INTERNAL MEDICINE

## 2023-11-20 PROCEDURE — 84100 ASSAY OF PHOSPHORUS: CPT | Performed by: PHYSICAL THERAPY ASSISTANT

## 2023-11-20 PROCEDURE — 63600175 PHARM REV CODE 636 W HCPCS: Performed by: INTERNAL MEDICINE

## 2023-11-20 PROCEDURE — 99900031 HC PATIENT EDUCATION (STAT)

## 2023-11-20 PROCEDURE — 27201114 HC TRAP (ANY): Performed by: INTERNAL MEDICINE

## 2023-11-20 PROCEDURE — 99233 PR SUBSEQUENT HOSPITAL CARE,LEVL III: ICD-10-PCS | Mod: ,,, | Performed by: STUDENT IN AN ORGANIZED HEALTH CARE EDUCATION/TRAINING PROGRAM

## 2023-11-20 PROCEDURE — 83735 ASSAY OF MAGNESIUM: CPT | Performed by: PHYSICAL THERAPY ASSISTANT

## 2023-11-20 PROCEDURE — 25000003 PHARM REV CODE 250: Performed by: STUDENT IN AN ORGANIZED HEALTH CARE EDUCATION/TRAINING PROGRAM

## 2023-11-20 PROCEDURE — 37000008 HC ANESTHESIA 1ST 15 MINUTES: Performed by: INTERNAL MEDICINE

## 2023-11-20 PROCEDURE — 87206 SMEAR FLUORESCENT/ACID STAI: CPT | Mod: 91 | Performed by: INTERNAL MEDICINE

## 2023-11-20 PROCEDURE — 31624 DX BRONCHOSCOPE/LAVAGE: CPT | Mod: ,,, | Performed by: INTERNAL MEDICINE

## 2023-11-20 PROCEDURE — 82962 GLUCOSE BLOOD TEST: CPT | Performed by: INTERNAL MEDICINE

## 2023-11-20 PROCEDURE — 87641 MR-STAPH DNA AMP PROBE: CPT | Performed by: STUDENT IN AN ORGANIZED HEALTH CARE EDUCATION/TRAINING PROGRAM

## 2023-11-20 PROCEDURE — D9220A PRA ANESTHESIA: ICD-10-PCS | Mod: ANES,,, | Performed by: ANESTHESIOLOGY

## 2023-11-20 PROCEDURE — D9220A PRA ANESTHESIA: ICD-10-PCS | Mod: CRNA,,, | Performed by: NURSE ANESTHETIST, CERTIFIED REGISTERED

## 2023-11-20 PROCEDURE — 25000003 PHARM REV CODE 250: Performed by: NURSE ANESTHETIST, CERTIFIED REGISTERED

## 2023-11-20 PROCEDURE — C1751 CATH, INF, PER/CENT/MIDLINE: HCPCS

## 2023-11-20 PROCEDURE — 99233 SBSQ HOSP IP/OBS HIGH 50: CPT | Mod: ,,, | Performed by: STUDENT IN AN ORGANIZED HEALTH CARE EDUCATION/TRAINING PROGRAM

## 2023-11-20 PROCEDURE — 94640 AIRWAY INHALATION TREATMENT: CPT

## 2023-11-20 PROCEDURE — 31623 DX BRONCHOSCOPE/BRUSH: CPT | Performed by: INTERNAL MEDICINE

## 2023-11-20 PROCEDURE — 25000242 PHARM REV CODE 250 ALT 637 W/ HCPCS: Performed by: INTERNAL MEDICINE

## 2023-11-20 PROCEDURE — 30200315 PPD INTRADERMAL TEST REV CODE 302: Performed by: STUDENT IN AN ORGANIZED HEALTH CARE EDUCATION/TRAINING PROGRAM

## 2023-11-20 PROCEDURE — 86580 TB INTRADERMAL TEST: CPT | Performed by: STUDENT IN AN ORGANIZED HEALTH CARE EDUCATION/TRAINING PROGRAM

## 2023-11-20 PROCEDURE — 31623 PR BRONCHOSCOPY,DIAGNOSTIC W BRUSH: ICD-10-PCS | Mod: 51,,, | Performed by: INTERNAL MEDICINE

## 2023-11-20 PROCEDURE — 21400001 HC TELEMETRY ROOM

## 2023-11-20 PROCEDURE — 85025 COMPLETE CBC W/AUTO DIFF WBC: CPT | Performed by: PHYSICAL THERAPY ASSISTANT

## 2023-11-20 PROCEDURE — 87798 DETECT AGENT NOS DNA AMP: CPT | Performed by: STUDENT IN AN ORGANIZED HEALTH CARE EDUCATION/TRAINING PROGRAM

## 2023-11-20 PROCEDURE — 83615 LACTATE (LD) (LDH) ENZYME: CPT | Performed by: INTERNAL MEDICINE

## 2023-11-20 PROCEDURE — 31624 DX BRONCHOSCOPE/LAVAGE: CPT | Performed by: INTERNAL MEDICINE

## 2023-11-20 PROCEDURE — 30000890 LABCORP MISCELLANEOUS TEST: Performed by: INTERNAL MEDICINE

## 2023-11-20 PROCEDURE — 87116 MYCOBACTERIA CULTURE: CPT | Mod: 59 | Performed by: INTERNAL MEDICINE

## 2023-11-20 PROCEDURE — 25000003 PHARM REV CODE 250: Performed by: PHYSICAL THERAPY ASSISTANT

## 2023-11-20 PROCEDURE — 87102 FUNGUS ISOLATION CULTURE: CPT | Mod: 59 | Performed by: INTERNAL MEDICINE

## 2023-11-20 PROCEDURE — 86140 C-REACTIVE PROTEIN: CPT | Performed by: INTERNAL MEDICINE

## 2023-11-20 PROCEDURE — 94761 N-INVAS EAR/PLS OXIMETRY MLT: CPT

## 2023-11-20 PROCEDURE — 31623 DX BRONCHOSCOPE/BRUSH: CPT | Mod: 51,,, | Performed by: INTERNAL MEDICINE

## 2023-11-20 PROCEDURE — D9220A PRA ANESTHESIA: Mod: CRNA,,, | Performed by: NURSE ANESTHETIST, CERTIFIED REGISTERED

## 2023-11-20 PROCEDURE — 63600175 PHARM REV CODE 636 W HCPCS: Performed by: NURSE ANESTHETIST, CERTIFIED REGISTERED

## 2023-11-20 PROCEDURE — 37000009 HC ANESTHESIA EA ADD 15 MINS: Performed by: INTERNAL MEDICINE

## 2023-11-20 PROCEDURE — 84145 PROCALCITONIN (PCT): CPT | Performed by: INTERNAL MEDICINE

## 2023-11-20 PROCEDURE — 87210 SMEAR WET MOUNT SALINE/INK: CPT | Performed by: INTERNAL MEDICINE

## 2023-11-20 PROCEDURE — 80053 COMPREHEN METABOLIC PANEL: CPT | Performed by: PHYSICAL THERAPY ASSISTANT

## 2023-11-20 PROCEDURE — D9220A PRA ANESTHESIA: Mod: ANES,,, | Performed by: ANESTHESIOLOGY

## 2023-11-20 PROCEDURE — 87070 CULTURE OTHR SPECIMN AEROBIC: CPT | Performed by: INTERNAL MEDICINE

## 2023-11-20 PROCEDURE — 27000679 HC ADAPTOR, BRONCHOSCOPE: Performed by: INTERNAL MEDICINE

## 2023-11-20 PROCEDURE — 27200946 HC BRUSH, CYTOLOGY: Performed by: INTERNAL MEDICINE

## 2023-11-20 PROCEDURE — 87449 NOS EACH ORGANISM AG IA: CPT | Performed by: INTERNAL MEDICINE

## 2023-11-20 PROCEDURE — 31624 PR BRONCHOSCOPY,DIAG2STIC W LAVAGE: ICD-10-PCS | Mod: ,,, | Performed by: INTERNAL MEDICINE

## 2023-11-20 PROCEDURE — 63600175 PHARM REV CODE 636 W HCPCS: Performed by: HOSPITALIST

## 2023-11-20 PROCEDURE — 87205 SMEAR GRAM STAIN: CPT | Performed by: INTERNAL MEDICINE

## 2023-11-20 PROCEDURE — 27000221 HC OXYGEN, UP TO 24 HOURS

## 2023-11-20 RX ORDER — LIDOCAINE HYDROCHLORIDE 20 MG/ML
INJECTION, SOLUTION EPIDURAL; INFILTRATION; INTRACAUDAL; PERINEURAL
Status: DISCONTINUED | OUTPATIENT
Start: 2023-11-20 | End: 2023-11-20

## 2023-11-20 RX ORDER — LIDOCAINE HYDROCHLORIDE 40 MG/ML
INJECTION, SOLUTION RETROBULBAR
Status: COMPLETED | OUTPATIENT
Start: 2023-11-20 | End: 2023-11-20

## 2023-11-20 RX ORDER — SUCCINYLCHOLINE CHLORIDE 20 MG/ML
INJECTION INTRAMUSCULAR; INTRAVENOUS
Status: DISCONTINUED | OUTPATIENT
Start: 2023-11-20 | End: 2023-11-20

## 2023-11-20 RX ORDER — ONDANSETRON HYDROCHLORIDE 2 MG/ML
INJECTION, SOLUTION INTRAMUSCULAR; INTRAVENOUS
Status: DISCONTINUED | OUTPATIENT
Start: 2023-11-20 | End: 2023-11-20

## 2023-11-20 RX ORDER — CHLORHEXIDINE GLUCONATE ORAL RINSE 1.2 MG/ML
15 SOLUTION DENTAL 2 TIMES DAILY
Status: DISCONTINUED | OUTPATIENT
Start: 2023-11-20 | End: 2023-11-23 | Stop reason: HOSPADM

## 2023-11-20 RX ORDER — ALBUTEROL SULFATE 0.83 MG/ML
SOLUTION RESPIRATORY (INHALATION)
Status: COMPLETED | OUTPATIENT
Start: 2023-11-20 | End: 2023-11-20

## 2023-11-20 RX ORDER — PROPOFOL 10 MG/ML
VIAL (ML) INTRAVENOUS CONTINUOUS PRN
Status: DISCONTINUED | OUTPATIENT
Start: 2023-11-20 | End: 2023-11-20

## 2023-11-20 RX ORDER — ROCURONIUM BROMIDE 10 MG/ML
INJECTION, SOLUTION INTRAVENOUS
Status: DISCONTINUED | OUTPATIENT
Start: 2023-11-20 | End: 2023-11-20

## 2023-11-20 RX ORDER — DIPHENHYDRAMINE HCL 25 MG
25 CAPSULE ORAL EVERY 6 HOURS PRN
Status: DISCONTINUED | OUTPATIENT
Start: 2023-11-20 | End: 2023-11-23 | Stop reason: HOSPADM

## 2023-11-20 RX ORDER — ONDANSETRON 2 MG/ML
4 INJECTION INTRAMUSCULAR; INTRAVENOUS DAILY PRN
Status: DISCONTINUED | OUTPATIENT
Start: 2023-11-20 | End: 2023-11-23 | Stop reason: HOSPADM

## 2023-11-20 RX ORDER — FENTANYL CITRATE 50 UG/ML
INJECTION, SOLUTION INTRAMUSCULAR; INTRAVENOUS
Status: DISCONTINUED | OUTPATIENT
Start: 2023-11-20 | End: 2023-11-20

## 2023-11-20 RX ORDER — MIDAZOLAM HYDROCHLORIDE 1 MG/ML
INJECTION INTRAMUSCULAR; INTRAVENOUS
Status: DISCONTINUED | OUTPATIENT
Start: 2023-11-20 | End: 2023-11-20

## 2023-11-20 RX ORDER — PROPOFOL 10 MG/ML
VIAL (ML) INTRAVENOUS
Status: DISCONTINUED | OUTPATIENT
Start: 2023-11-20 | End: 2023-11-20

## 2023-11-20 RX ORDER — BUTALBITAL, ACETAMINOPHEN AND CAFFEINE 50; 325; 40 MG/1; MG/1; MG/1
1 TABLET ORAL EVERY 4 HOURS PRN
Status: DISCONTINUED | OUTPATIENT
Start: 2023-11-20 | End: 2023-11-23 | Stop reason: HOSPADM

## 2023-11-20 RX ORDER — PHENYLEPHRINE HYDROCHLORIDE 10 MG/ML
INJECTION INTRAVENOUS
Status: DISCONTINUED | OUTPATIENT
Start: 2023-11-20 | End: 2023-11-20

## 2023-11-20 RX ADMIN — PANTOPRAZOLE SODIUM 40 MG: 40 TABLET, DELAYED RELEASE ORAL at 12:11

## 2023-11-20 RX ADMIN — MIDAZOLAM HYDROCHLORIDE 2 MG: 1 INJECTION, SOLUTION INTRAMUSCULAR; INTRAVENOUS at 10:11

## 2023-11-20 RX ADMIN — HYDROCODONE BITARTRATE AND ACETAMINOPHEN 1 TABLET: 5; 325 TABLET ORAL at 01:11

## 2023-11-20 RX ADMIN — PHENYLEPHRINE HYDROCHLORIDE 100 MCG: 10 INJECTION INTRAVENOUS at 10:11

## 2023-11-20 RX ADMIN — SERTRALINE HYDROCHLORIDE 100 MG: 50 TABLET ORAL at 12:11

## 2023-11-20 RX ADMIN — FENTANYL CITRATE 50 MCG: 0.05 INJECTION, SOLUTION INTRAMUSCULAR; INTRAVENOUS at 10:11

## 2023-11-20 RX ADMIN — BUSPIRONE HYDROCHLORIDE 7.5 MG: 5 TABLET ORAL at 09:11

## 2023-11-20 RX ADMIN — TUBERCULIN PURIFIED PROTEIN DERIVATIVE 5 UNITS: 5 INJECTION, SOLUTION INTRADERMAL at 07:11

## 2023-11-20 RX ADMIN — METOPROLOL SUCCINATE 50 MG: 50 TABLET, EXTENDED RELEASE ORAL at 12:11

## 2023-11-20 RX ADMIN — ENOXAPARIN SODIUM 40 MG: 40 INJECTION SUBCUTANEOUS at 09:11

## 2023-11-20 RX ADMIN — SUGAMMADEX 400 MG: 100 INJECTION, SOLUTION INTRAVENOUS at 11:11

## 2023-11-20 RX ADMIN — ZOLPIDEM TARTRATE 10 MG: 5 TABLET, COATED ORAL at 09:11

## 2023-11-20 RX ADMIN — LEVALBUTEROL HYDROCHLORIDE 1.25 MG: 1.25 SOLUTION RESPIRATORY (INHALATION) at 12:11

## 2023-11-20 RX ADMIN — POTASSIUM BICARBONATE 50 MEQ: 977.5 TABLET, EFFERVESCENT ORAL at 08:11

## 2023-11-20 RX ADMIN — ONDANSETRON 4 MG: 2 INJECTION INTRAMUSCULAR; INTRAVENOUS at 10:11

## 2023-11-20 RX ADMIN — VARENICLINE TARTRATE 0.5 MG: 0.5 TABLET, FILM COATED ORAL at 09:11

## 2023-11-20 RX ADMIN — ROCURONIUM BROMIDE 30 MG: 10 INJECTION, SOLUTION INTRAVENOUS at 10:11

## 2023-11-20 RX ADMIN — BUSPIRONE HYDROCHLORIDE 7.5 MG: 5 TABLET ORAL at 02:11

## 2023-11-20 RX ADMIN — LIDOCAINE HYDROCHLORIDE 50 MG: 20 INJECTION, SOLUTION INTRAVENOUS at 10:11

## 2023-11-20 RX ADMIN — VORICONAZOLE 600 MG: 10 INJECTION, POWDER, LYOPHILIZED, FOR SOLUTION INTRAVENOUS at 01:11

## 2023-11-20 RX ADMIN — VARENICLINE TARTRATE 0.5 MG: 0.5 TABLET, FILM COATED ORAL at 12:11

## 2023-11-20 RX ADMIN — PROPOFOL 200 MG: 10 INJECTION, EMULSION INTRAVENOUS at 10:11

## 2023-11-20 RX ADMIN — SODIUM CHLORIDE, SODIUM LACTATE, POTASSIUM CHLORIDE, AND CALCIUM CHLORIDE: .6; .31; .03; .02 INJECTION, SOLUTION INTRAVENOUS at 10:11

## 2023-11-20 RX ADMIN — Medication 800 MG: at 08:11

## 2023-11-20 RX ADMIN — PANTOPRAZOLE SODIUM 40 MG: 40 TABLET, DELAYED RELEASE ORAL at 09:11

## 2023-11-20 RX ADMIN — HYDROCODONE BITARTRATE AND ACETAMINOPHEN 1 TABLET: 5; 325 TABLET ORAL at 12:11

## 2023-11-20 RX ADMIN — DIPHENHYDRAMINE HYDROCHLORIDE 25 MG: 25 CAPSULE ORAL at 09:11

## 2023-11-20 RX ADMIN — VANCOMYCIN HYDROCHLORIDE 2000 MG: 500 INJECTION, POWDER, LYOPHILIZED, FOR SOLUTION INTRAVENOUS at 04:11

## 2023-11-20 RX ADMIN — Medication 800 MG: at 07:11

## 2023-11-20 RX ADMIN — CEFTRIAXONE SODIUM 2 G: 2 INJECTION, POWDER, FOR SOLUTION INTRAMUSCULAR; INTRAVENOUS at 06:11

## 2023-11-20 RX ADMIN — LEVALBUTEROL HYDROCHLORIDE 1.25 MG: 1.25 SOLUTION RESPIRATORY (INHALATION) at 08:11

## 2023-11-20 RX ADMIN — HYDROCODONE BITARTRATE AND ACETAMINOPHEN 1 TABLET: 5; 325 TABLET ORAL at 09:11

## 2023-11-20 RX ADMIN — ROCURONIUM BROMIDE 5 MG: 10 INJECTION, SOLUTION INTRAVENOUS at 10:11

## 2023-11-20 RX ADMIN — Medication 120 MG: at 10:11

## 2023-11-20 RX ADMIN — VANCOMYCIN HYDROCHLORIDE 125 MG: KIT at 09:11

## 2023-11-20 RX ADMIN — ATORVASTATIN CALCIUM 10 MG: 10 TABLET, FILM COATED ORAL at 09:11

## 2023-11-20 RX ADMIN — PROPOFOL 70 MCG/KG/MIN: 10 INJECTION, EMULSION INTRAVENOUS at 10:11

## 2023-11-20 RX ADMIN — DOXYCYCLINE 100 MG: 100 INJECTION, POWDER, LYOPHILIZED, FOR SOLUTION INTRAVENOUS at 02:11

## 2023-11-20 RX ADMIN — LEVALBUTEROL HYDROCHLORIDE 1.25 MG: 1.25 SOLUTION RESPIRATORY (INHALATION) at 04:11

## 2023-11-20 NOTE — PROGRESS NOTES
Jacoby Jean-Baptiste [id number] is a 48 y.o. male who has been consulted for vancomycin dosing.    Pharmacy consult for vancomycin dosing in no longer required.  Vancomycin was discontinued.    Thank you for allowing us to participate in this patient's care.     Tea Dutton,  Pharm.D

## 2023-11-20 NOTE — CONSULTS
Pulmonary/Critical Care Consult      PATIENT NAME: Jacoby Jean-Baptiste  MRN: [id number]  TODAY'S DATE: 2023  8:01 AM  ADMIT DATE: 2023  AGE: 48 y.o. : 1975    CONSULT REQUESTED BY: Мария Durham MD    REASON FOR CONSULT:   Shortness of breath and pneumonia    HPI:  The patient is a 48-year-old male who began experiencing chest pain and shortness of breath Saturday a week ago.  He is expectorating green mucous.  His CT showed some patchy nodular infiltrates 1 of which is beginning to cavitating the bottom of the left lower lobe.  The patient is immunosuppressed from his Remicade injections for his ulcerative colitis.  The patient initially thought he had flu because others around him had the flu and he is not vaccinated.  However he ruled out for COVID and the flu and shortness of breath continued and so he came to the emergency room.    REVIEW OF SYSTEMS  GENERAL: Feeling short of breath with exertion  EYES: Vision is good.  ENT: No sinusitis or pharyngitis.   HEART: No chest pain or palpitations.  LUNGS:  He is coughing and producing green mucous.  GI: No Nausea, vomiting, constipation, diarrhea, or reflux.  : No dysuria, hesitancy, or nocturia.  Skin:  He was not aware of his rash  MUSCULOSKELETAL: No joint pain or myalgias.  NEURO: No headaches or neuropathy.  LYMPH: No edema or adenopathy.  PSYCH: No anxiety or depression.  ENDO: No weight change.    ALLERGIES  Review of patient's allergies indicates:  No Known Allergies    INPATIENT SCHEDULED MEDICATIONS   atorvastatin  10 mg Oral QHS    busPIRone  7.5 mg Oral TID    cefTRIAXone (ROCEPHIN) IVPB  2 g Intravenous Q24H    enoxparin  40 mg Subcutaneous Q12H (prophylaxis, 0900/2100)    lactobacillus acidophilus & bulgar  1 packet Oral BID    levalbuterol  1.25 mg Nebulization Q8H    metoprolol succinate  50 mg Oral Daily    pantoprazole  40 mg Oral BID    polyethylene glycol  17 g Oral Daily    sertraline  100 mg Oral Daily    vancomycin (VANCOCIN)  IV (PEDS and ADULTS)  2,000 mg Intravenous Once    vancomycin (VANCOCIN) IV (PEDS and ADULTS)  2,000 mg Intravenous Q12H    varenicline  0.5 mg Oral BID    vorconazole (VFEND) IVPB  400 mg Intravenous Q12H      sodium chloride 0.9%         MEDICAL AND SURGICAL HISTORY  Past Medical History:   Diagnosis Date    Diabetes mellitus 01/2022    Hyperlipidemia 2015    Hypertension 2015    Insomnia 2015     Past Surgical History:   Procedure Laterality Date    CARDIAC ELECTROPHYSIOLOGY MAPPING AND ABLATION  2017    SVT    CHOLECYSTECTOMY  2011    COLONOSCOPY N/A 5/3/2022    Procedure: COLONOSCOPY;  Surgeon: Shan Jean III, MD;  Location: [facility] ENDO;  Service: Endoscopy;  Laterality: N/A;    COLONOSCOPY WITH FECAL MICROBIOTA TRANSFER N/A 3/21/2023    Procedure: COLONOSCOPY, WITH FECAL MICROBIOTA TRANSFER;  Surgeon: David Millan MD;  Location: [facility];  Service: Endoscopy;  Laterality: N/A;    ESOPHAGOGASTRODUODENOSCOPY N/A 4/24/2023    Procedure: EGD (ESOPHAGOGASTRODUODENOSCOPY);  Surgeon: Shan Jean III, MD;  Location: [facility];  Service: Endoscopy;  Laterality: N/A;    GANGLION CYST EXCISION Left 1992    UMBILICAL HERNIA REPAIR  2011    with mesh       ALCOHOL, TOBACCO AND DRUG USE  Social History     Tobacco Use   Smoking Status Every Day    Current packs/day: 1.00    Average packs/day: 1 pack/day for 30.0 years (30.0 ttl pk-yrs)    Types: Cigarettes   Smokeless Tobacco Never   Tobacco Comments    DO NOT SMOKE DOS   The patient states he stopped smoking Saturday a week ago.  Prior to that he was 1 pack a day.  He states he does not drink alcohol.  Social History     Substance and Sexual Activity   Alcohol Use Yes    Comment: ocass     Social History     Substance and Sexual Activity   Drug Use Not Currently       FAMILY HISTORY  Family History   Problem Relation Age of Onset    Asthma Mother        VITAL SIGNS (MOST RECENT)  Temp: 97.6 °F (36.4 °C) (11/20/23 0356)  Pulse: 80 (11/20/23 0356)  Resp:  17 (11/20/23 0356)  BP: 125/68 (11/20/23 0356)  SpO2: 95 % (11/20/23 0356)    INTAKE AND OUTPUT (LAST 24 HOURS):  Intake/Output Summary (Last 24 hours) at 11/20/2023 0801  Last data filed at 11/20/2023 0400  Gross per 24 hour   Intake 480 ml   Output 0 ml   Net 480 ml       WEIGHT  Wt Readings from Last 1 Encounters:   11/19/23 132.9 kg (292 lb 15.9 oz)       PHYSICAL EXAM  GENERAL:  Mid aged obese patient in no distress.  HEENT: Pupils equal and reactive. Extraocular movements intact. Nose intact. Pharynx moist.  NECK: Supple.   HEART: Regular rate and rhythm. No murmur or gallop auscultated.  LUNGS: Clear to auscultation and percussion. Lung excursion symmetrical. No change in fremitus. No adventitial noises.  ABDOMEN: Bowel sounds present.  Obese.  Non-tender, no masses palpated.  : Normal anatomy.  EXTREMITIES: Normal muscle tone and joint movement, no cyanosis or clubbing.   LYMPHATICS: No adenopathy palpated, no edema.  SKIN: Dry, there is a dry petechial rash in the lower abdomen across the groin across the buttocks and in the axilla.  It is nonpruritic.  The patient was not aware that it was there.  NEURO: Cranial nerves II-XII intact. Motor strength 5/5 bilaterally, upper and lower extremities.  PSYCH:  Very quiet.    ACUTE PHASE REACTANT (LAST 24 HOURS)  Recent Labs   Lab 11/20/23 0457          CBC LAST (LAST 24 HOURS)  Recent Labs   Lab 11/20/23 0457   WBC 12.86*   RBC 3.59*   HGB 12.1*   HCT 36.4*   *   MCH 33.7*   MCHC 33.2   RDW 14.0      MPV 10.9   GRAN 72.6  9.3*   LYMPH 9.0*  1.2   MONO 10.3  1.3*   BASO 0.04   NRBC 0       CHEMISTRY LAST (LAST 24 HOURS)  Recent Labs   Lab 11/20/23 0457      K 3.7      CO2 26   ANIONGAP 6*   BUN 14   CREATININE 1.1      CALCIUM 7.8*   MG 1.3*   ALBUMIN 3.0*   PROT 6.4   ALKPHOS 58   ALT 18   AST 18   BILITOT 0.3         CARDIAC PROFILE (LAST 24 HOURS)  Recent Labs   Lab 11/17/23  0931 11/17/23  1126 11/20/23  0456    BNP 18  --   --    LDH  --   --  192   TROPONINIHS 7.2 7.2  --        LAST 7 DAYS MICROBIOLOGY   Microbiology Results (last 7 days)       Procedure Component Value Units Date/Time    Blood culture [[id number]] Collected: 11/19/23 0539    Order Status: Completed Specimen: Blood Updated: 11/20/23 0632     Blood Culture, Routine No Growth to date      No Growth to date    AFB Culture & Smear [[id number]]     Order Status: Sent Specimen: Sputum, Induced     Blood culture x two cultures. Draw prior to antibiotics. [[id number]] Collected: 11/17/23 1257    Order Status: Completed Specimen: Blood from Antecubital, Right Arm Updated: 11/19/23 1432     Blood Culture, Routine No Growth to date      No Growth to date      No Growth to date    Narrative:      Aerobic and anaerobic    Culture, Respiratory with Gram Stain [[id number]]     Order Status: Sent Specimen: Respiratory from Sputum, Induced     AFB Culture & Smear [[id number]]     Order Status: Sent Specimen: Sputum, Induced     Blood culture [[id number]] Collected: 11/18/23 1014    Order Status: Completed Specimen: Blood Updated: 11/19/23 1032     Blood Culture, Routine No Growth to date      No Growth to date    Cryptococcal antigen [[id number]] Collected: 11/19/23 0539    Order Status: Sent Specimen: Blood, Venous Updated: 11/19/23 0616    Stool culture [[id number]]     Order Status: No result Specimen: Stool     Culture, Respiratory with Gram Stain [[id number]]     Order Status: Canceled Specimen: Respiratory     AFB Culture & Smear [[id number]]     Order Status: Canceled Specimen: Sputum     Blood culture x two cultures. Draw prior to antibiotics. [[id number]]     Order Status: Canceled Specimen: Blood             MOST RECENT IMAGING  CTA Head  CMS MANDATED QUALITY DATA - CT RADIATION  436    All CT scans at this facility utilize dose modulation, iterative reconstruction, and/or weight based dosing when appropriate to reduce radiation dose to as low as reasonably  achievable.    Coronal and sagittal reformatted MIP images were created on an independent workstation, with images stored in the patient's permanent electronic medical record.    CT HEAD ANGIOGRAPHY WITH IV CONTRAST    CLINICAL HISTORY:  48 years Male Headache, sudden, severe    COMPARISON: Noncontrast CT head same day. Brain MRI May 11, 2023    FINDINGS:    Visualized intracranial vertebral artery V4 segments demonstrates scattered atherosclerotic calcification and are patent. The basilar artery is patent. Bilateral posterior cerebral arteries are patent. Scattered atherosclerotic calcification of intracranial cavernous carotid arteries. Bilateral middle cerebral arteries and branch vessels are patent. Bilateral anterior cerebral arteries are patent. No intracranial aneurysm or vascular malformation is evident.    Dural venous sinuses are patent. No pathologic intracranial contrast enhancement.    Small volume right-sided mastoid fluid. Left mastoid air cells are clear. Paranasal sinuses are clear.    IMPRESSION:    Negative for intracranial aneurysm, vascular malformation, or arterial occlusion.    Electronically signed by:  Rickie Brown MD  11/19/2023 12:43 PM CST Workstation: [id number]  X-Ray Chest AP Single View  XR CHEST 1 VIEW    CLINICAL HISTORY:  48 years Male arrhythmia    COMPARISON: CT chest November 17, 2023    FINDINGS: Faint nodular densities are present in the left lower lobe, corresponding to reference chest CT findings. No confluent airspace disease. No interstitial edema. No large pleural effusion or pneumothorax. Heart size is within normal limits. No acute osseous abnormality.    IMPRESSION:    Nodular alveolar densities involving the left lower lobe, potentially infectious/inflammatory in nature, corresponding to recent chest CT findings.    Electronically signed by:  Rickie Brown MD  11/19/2023 11:54 AM CST Workstation: [id number]  CT Head Without Contrast  CMS MANDATED QUALITY DATA - CT  RADIATION  436    All CT scans at this facility utilize dose modulation, iterative reconstruction, and/or weight based dosing when appropriate to reduce radiation dose to as low as reasonably achievable.    CT HEAD WITHOUT IV CONTRAST    CLINICAL HISTORY:  48 years Male Headache, sudden, severe; eval for septic emboli    COMPARISON: Brain MRI May 11, 2023    FINDINGS: Negative for acute intracranial hemorrhage, midline shift, or mass effect. Ventricles and sulci are normal in size. Gray-white differentiation is maintained. Cerebellar hemispheres and brainstem are unremarkable. Atherosclerotic calcification of intracranial carotid and vertebral arteries.    No calvarial lesion or fracture. Mastoid air cells are clear on the left. Small-volume mastoid fluid on the right. Paranasal sinuses are clear.    IMPRESSION:    No CT evidence of acute intracranial pathology.    Small right mastoid effusion.    Electronically signed by:  Rickie Brown MD  11/19/2023 09:51 AM CST Workstation: [id number]      CURRENT VISIT EKG  Results for orders placed or performed during the hospital encounter of 11/17/23   EKG 12-LEAD    Narrative    Test Reason : I48.91,I48.92,    Vent. Rate : 105 BPM     Atrial Rate : 105 BPM     P-R Int : 130 ms          QRS Dur : 074 ms      QT Int : 330 ms       P-R-T Axes : 057 019 031 degrees     QTc Int : 436 ms    Sinus tachycardia  Nonspecific T wave abnormality  Abnormal ECG  When compared with ECG of 17-NOV-2023 09:57,  Questionable change in QRS duration  Confirmed by Mynor RENTERIA, Harris ONEILL ([id number]) on 11/19/2023 3:43:52 PM    Referred By: [email or web address]   SELF           Confirmed By:Harris Lowe MD       ECHOCARDIOGRAM RESULTS  Results for orders placed during the hospital encounter of 11/17/23    Echo    Interpretation Summary    Left Ventricle: The left ventricle is normal in size. Normal wall thickness. There is concentric remodeling. Normal wall motion. There is normal systolic function with a  visually estimated ejection fraction of 55 - 60%. There is normal diastolic function.    Right Ventricle: Normal right ventricular cavity size. Wall thickness is normal. Right ventricle wall motion  is normal. Systolic function is normal.    IVC/SVC: Normal venous pressure at 3 mmHg.    The patient is on room air.    IMPRESSION AND PLAN  Bilateral pneumonia with a nodular cavitary appearance.  - some immunosuppression secondary to Remicade  - bronchoscopy today with lavage and brushes  - ask nurse to collect sputum for culture  Ulcerative colitis  Rash   - which is likely developed secondary to antibiotics/antiviral/antifungal  - Dr. Chaves notified  Leukocytosis  Anemia  Hypomagnesemia  - replace and trend  Morbid obesity/mild hypoalbuminemia  Opiates and marijuana present on his drug screen   - both of which he denied  Probable obstructive sleep apnea  30 pack-year tobacco use  Abnormal urinalysis    Lisa Villarreal MD  Date of Service: 11/20/2023  8:01 AM

## 2023-11-20 NOTE — PROGRESS NOTES
Formerly [facility], [facility]  Department of Infectious Disease  Progress Note        PATIENT NAME: Jacoby Jean-Baptiste  MRN: [id number]  TODAY'S DATE: 11/20/2023  ADMIT DATE: 11/17/2023    PRINCIPLE PROBLEM: Septic embolism    INTERVAL HISTORY      11/20 Chaves:  Interim reviewed, discussed with Dr. Humphrey.  Patient seen examined at bedside, status post bronchoscopy this morning.  He developed petechial rash on his right flank, underneath his abdomen and bilateral groins.  He is tachypneic, with productive cough, on O2 by NC 3 L. he reports he feels okay, no change.  Slightly hypertensive, afebrile.  Labs reviewed, leukocytosis down to 12.8, no left shift, H&H 12.1/36.4, platelet count 221.  Creatinine 1.1, normal LFTs, CRP higher to 65 0.5, procalcitonin down to 12.2, still positive.  Vanc level this morning 26.3, supratherapeutic, pharmacy to adjust.  U tox positive for opiates and marijuana.  Micro reviewed, so far no growth, awaiting further infectious workup sent during the weekend.  KOH from bronchoscopy today no yeast identified.    Antibiotics (From admission, onward)      Start     Stop Route Frequency Ordered    11/20/23 1445  doxycycline (VIBRAMYCIN) 100 mg in dextrose 5% 100 mL IVPB (ready to mix)         -- IV Every 12 hours (non-standard times) 11/20/23 1335    11/19/23 1830  cefTRIAXone (ROCEPHIN) 2 g in dextrose 5 % 100 mL IVPB (ready to mix)         -- IV Every 24 hours (non-standard times) 11/19/23 1716            ASSESSMENT and PLAN     Sepsis likely secondary to cavitary pneumonia/necrosis s/p BAL today  -->265.5, high  Procal 17-->12  Blood cultures x2 sets no growth to date, pending final     Petechial rash    H/o UC on Remicade, immunocompromised    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 twice a day for C diff prophylaxis while on antibiotics   Plan for KATJA tomorrow   Follow-up extensive workup    D/w patient, nursing, Dr Durham, Dr Villarreal    Thank you for this consult. Please send [facility] secure chat with any questions.      SUBJECTIVE        Review of Systems  Constitutional:  Denies fevers, chills, night sweats, loss of appetite.  HEENT: Denies visual changes, ear pain, sinus congestion, mouth pain or trouble swallowing, +poor oral hygiene   Neck: Denies neck pain or lumps.  Respiratory: +shortness of breath, mild productive cough, some wheezing, no hemoptysis  Cardiovascular:  Denies chest pain, palpitations or edema.  Gastrointestinal: Denies  nausea, vomiting, constipation or diarrhea.  Genitourinary:  Denies dysuria, frequency, urgency or hematuria   Musculoskeletal:  Denies joint pain or swelling, difficulty walking.    Skin:  Denies rash or itching.  VAD:  PIV, L midline to be placed  Neurologic:  Denies motor or sensory loss, headaches or dizziness.    Psychiatric:  Denies changes in mood or behavior.       OBJECTIVE     Temp:  [96.2 °F (35.7 °C)-97.9 °F (36.6 °C)] 96.8 °F (36 °C)  Pulse:  [] 87  Resp:  [16-20] 17  SpO2:  [93 %-100 %] 97 %  BP: (116-155)/(63-93) 141/63    Physical Exam  General:  Obese male, sitting in the chair, on O2 by NC 3 L  Eyes: Eyes with no icterus or injection. Vision grossly normal  Ears: Hearing grossly normal.  Nose: Nares patent  Mouth: Moist mucous membranes, dentition is terrible: severe decay, rotten teeth. No ulcerations, erythema or exudates.  Neck: Supple, no tenderness to palpation.  Cardiovascular: Regular rate and rhythm, no murmurs, no edema.    Respiratory:  Bilateral rales  Gastrointestinal:  Soft with active bowel sounds, no tenderness to  "palpation, no distention.  Genitourinary:  No suprapubic tenderness.  Musculoskeletal:  Moves all extremities with good strength.    Skin:  Warm and dry, no obvious rashes.    Neuro:   Oriented, conversant, follows commands.  Psych:  Calm    WOUNDS:   VAD: L Midline  ISOLATION: Airborne/rule out TB    CBC LAST 7 DAYS  Recent Labs   Lab 11/17/23  0931 11/18/23 0437 11/18/23 1647 11/19/23 0540 11/20/23 0457   WBC 12.53 12.80* 14.80* 14.08* 12.86*   RBC 4.65 4.01* 4.10* 3.83* 3.59*   HGB 16.1 13.4* 14.0 12.9* 12.1*   HCT 46.4 40.5 41.7 38.0* 36.4*   * 101* 102* 99* 101*   MCH 34.6* 33.4* 34.1* 33.7* 33.7*   MCHC 34.7 33.1 33.6 33.9 33.2   RDW 13.7 13.8 13.9 13.7 14.0   * 160 163 196 221   MPV 11.3 11.4 10.8 11.1 10.9   GRAN 82.0*  10.3* 78.0*  10.0* 77.6*  11.5* 77.6*  10.9* 72.6  9.3*   LYMPH 8.9*  1.1 10.5*  1.4 8.9*  1.3 7.6*  1.1 9.0*  1.2   MONO 8.5  1.1* 9.9  1.3* 10.3  1.5* 9.9  1.4* 10.3  1.3*   BASO 0.03 0.04 0.02 0.02 0.04   NRBC 0 0 0 0 0       CHEMISTRY LAST 7 DAYS  Recent Labs   Lab 11/17/23  0931 11/18/23 0437 11/18/23 1647 11/19/23  0540 11/20/23  0457    137 136 135* 137   K 4.1 4.4 4.2 4.0 3.7    107 105 103 105   CO2 26 23 26 21* 26   ANIONGAP 8 7* 5* 11 6*   BUN 11 10 12 11 14   CREATININE 1.1 0.9 1.0 0.9 1.1   * 115* 88 82 106   CALCIUM 9.4 8.4* 8.4* 8.2* 7.8*   MG 1.3* 1.5* 1.5* 1.3* 1.3*   ALBUMIN 4.4 3.5  --  3.5 3.0*   PROT 8.4 6.8  --  7.2 6.4   ALKPHOS 70 57  --  55 58   ALT 27 19  --  20 18   AST 25 18  --  31 18   BILITOT 0.6 0.5  --  0.6 0.3       Estimated Creatinine Clearance: 103.5 mL/min (based on SCr of 1.1 mg/dL).    INFLAMMATORY/PROCAL  LAST 7 DAYS  Recent Labs   Lab 11/17/23  1350 11/17/23  1352 11/20/23  0457   PROCAL 17.063*  --  12.207*   CRP  --  228.2* 265.5*     No results found for: "ESR"  CRP   Date Value Ref Range Status   11/20/2023 265.5 (H) 0.0 - 8.2 mg/L Final   11/17/2023 228.2 (H) 0.0 - 8.2 mg/L Final   07/25/2023 " 0.46 <0.76 mg/dL Final   04/06/2023 11.55 (H) <0.76 mg/dL Final   01/11/2023 20.88 (H) <0.76 mg/dL Final   01/06/2023 11.02 (H) <0.76 mg/dL Final       PRIOR  MICROBIOLOGY:      LAST 7 DAYS MICROBIOLOGY   Microbiology Results (last 7 days)       Procedure Component Value Units Date/Time    Culture, Respiratory with Gram Stain [[id number]] Collected: 11/20/23 1201    Order Status: Completed Specimen: Bronchial Wash, LLL Updated: 11/20/23 1620     Gram Stain (Respiratory) <10 epithelial cells per low power field.     Gram Stain (Respiratory) Rare WBC's     Gram Stain (Respiratory) No organisms seen    FANNY prep [[id number]] Collected: 11/20/23 1201    Order Status: Completed Specimen: Bronchial Brush from Bronchial Wash, LLL Updated: 11/20/23 1529     KOH Prep No yeast or fungal elements seen    KOH prep [[id number]] Collected: 11/20/23 1201    Order Status: Completed Specimen: Bronchial Wash, LLL Updated: 11/20/23 1529     KOH Prep No yeast or fungal elements seen    Blood culture x two cultures. Draw prior to antibiotics. [[id number]] Collected: 11/17/23 1257    Order Status: Completed Specimen: Blood from Antecubital, Right Arm Updated: 11/20/23 1432     Blood Culture, Routine No Growth to date      No Growth to date      No Growth to date      No Growth to date    Narrative:      Aerobic and anaerobic    AFB Culture & Smear [[id number]] Collected: 11/20/23 1201    Order Status: Sent Specimen: Respiratory from Bronchial Wash, LLL Updated: 11/20/23 1340    Fungus culture [[id number]] Collected: 11/20/23 1201    Order Status: Sent Specimen: Respiratory from Bronchial Wash, LLL Updated: 11/20/23 1340    AFB Culture & Smear [[id number]] Collected: 11/20/23 1201    Order Status: Sent Specimen: Respiratory from Bronchial Wash, LLL Updated: 11/20/23 1340    Fungus culture [[id number]] Collected: 11/20/23 1201    Order Status: Sent Specimen: Respiratory from Bronchial Wash, LLL Updated: 11/20/23 1339    Blood culture  [[id number]] Collected: 11/18/23 1014    Order Status: Completed Specimen: Blood Updated: 11/20/23 1032     Blood Culture, Routine No Growth to date      No Growth to date      No Growth to date    Blood culture [[id number]] Collected: 11/19/23 0539    Order Status: Completed Specimen: Blood Updated: 11/20/23 0632     Blood Culture, Routine No Growth to date      No Growth to date    AFB Culture & Smear [[id number]]     Order Status: Sent Specimen: Sputum, Induced     Culture, Respiratory with Gram Stain [[id number]]     Order Status: Sent Specimen: Respiratory from Sputum, Induced     AFB Culture & Smear [[id number]]     Order Status: Sent Specimen: Sputum, Induced     Cryptococcal antigen [[id number]] Collected: 11/19/23 0539    Order Status: Sent Specimen: Blood, Venous Updated: 11/19/23 0616    Stool culture [[id number]]     Order Status: No result Specimen: Stool     Culture, Respiratory with Gram Stain [[id number]]     Order Status: Canceled Specimen: Respiratory     AFB Culture & Smear [[id number]]     Order Status: Canceled Specimen: Sputum     Blood culture x two cultures. Draw prior to antibiotics. [[id number]]     Order Status: Canceled Specimen: Blood             SUSCEPTIBILITY  No results found for the last 90 days.      CURRENT/PREVIOUS VISIT EKG  Results for orders placed or performed during the hospital encounter of 11/17/23   EKG 12-LEAD    Collection Time: 11/19/23  7:14 AM    Narrative    Test Reason : I48.91,I48.92,    Vent. Rate : 105 BPM     Atrial Rate : 105 BPM     P-R Int : 130 ms          QRS Dur : 074 ms      QT Int : 330 ms       P-R-T Axes : 057 019 031 degrees     QTc Int : 436 ms    Sinus tachycardia  Nonspecific T wave abnormality  Abnormal ECG  When compared with ECG of 17-NOV-2023 09:57,  Questionable change in QRS duration  Confirmed by Mynor RENTERIA, Harris ONEILL ([id number]) on 11/19/2023 3:43:52 PM    Referred By: [email or web address]   SELF           Confirmed By:Harris Lowe MD       Significant  Labs: All pertinent labs within the past 24 hours have been reviewed.     Significant Imaging: I have reviewed all relevant and available imaging results/findings within the past 24 hours.    CTA Head: Negative for intracranial aneurysm, vascular malformation, or arterial occlusion.      CT head  No CT evidence of acute intracranial pathology.  Small right mastoid effusion.     11/17/2023 CT chest CT abdomen   1. No evidence of pulmonary embolism to the central main arterial level. If there is continued clinical concern, consider further evaluation with lower extremity doppler or repeat imaging.   2. Scattered ill-defined nodular densities in the lungs, most noticeably in the left lower lobe, some of which show central cavitation suspicious for septic emboli and/or necrosis. Consider correlation with the symptoms, history and possible echocardiogram.   3. Faint striations in the renal nephrograms, possibly secondary to phase of contrast or mild pyelonephritis, consider correlation with urinalysis.   4. Small bilateral nonobstructing renal stones.   5. Hepatomegaly background parenchyma findings a steatosis.   6. Prior cholecystectomy with no intra-/extrahepatic biliary ductal dilation.      Cardiology:  11/17/2023 ECHO     Left Ventricle: The left ventricle is normal in size. Normal wall thickness. There is concentric remodeling. Normal wall motion. There is normal systolic function with a visually estimated ejection fraction of 55 - 60%. There is normal diastolic function.    Right Ventricle: Normal right ventricular cavity size. Wall thickness is normal. Right ventricle wall motion  is normal. Systolic function is normal.    IVC/SVC: Normal venous pressure at 3 mmHg.       Ophelia Galindo MD  Date of Service: 11/20/2023  5:17 PM

## 2023-11-20 NOTE — SUBJECTIVE & OBJECTIVE
Interval History:  His headache is improved.  Denies chest pain or palpitations.  His cough is ongoing but also improved.  Discussed case with ID.    Review of Systems Complete ROS otherwise negative other than stated in HPI.   Objective:     Vital Signs (Most Recent):  Temp: 96.2 °F (35.7 °C) (11/20/23 1231)  Pulse: (P) 97 (11/20/23 1258)  Resp: 16 (11/20/23 1313)  BP: (!) 155/70 (11/20/23 1258)  SpO2: 95 % (11/20/23 1231) Vital Signs (24h Range):  Temp:  [96.2 °F (35.7 °C)-97.9 °F (36.6 °C)] 96.2 °F (35.7 °C)  Pulse:  [] (P) 97  Resp:  [16-20] 16  SpO2:  [92 %-100 %] 95 %  BP: (116-155)/(63-93) 155/70     Weight: 132.9 kg (292 lb 15.9 oz)  Body mass index is 48.76 kg/m².    Intake/Output Summary (Last 24 hours) at 11/20/2023 1358  Last data filed at 11/20/2023 1235  Gross per 24 hour   Intake 120 ml   Output 0 ml   Net 120 ml         Physical Exam  GENERAL:  Alert and oriented x 3  HEENT:  EOMI. Conjunctivae intact.   NECK:  Supple   LUNGS:  No respiratory distress. Clear to auscultation bilaterally with good air movement  CARDIAC:  RRR without murmur, rub or gallop  ABDOMEN:  Soft,  Nontender and nondistended, no rebound or guarding, bowel sounds present   EXTREMITIES:  Peripheral pulses are 2+. Hands and feet are warm. Good capillary refill in fingers (< 2 seconds). No clubbing, cyanosis or edema  SKIN:  Skin color, texture and turgor normal. No rashes, ulcerations or nodules noted  NEUROLOGIC:  CN II-XII intact. Light touch sensation intact. Muscle strength 5/5 in all extremities        Significant Labs: All pertinent labs within the past 24 hours have been reviewed.  Blood Culture:   Recent Labs   Lab 11/19/23  0539   LABBLOO No Growth to date  No Growth to date     BMP:   Recent Labs   Lab 11/20/23  0457         K 3.7      CO2 26   BUN 14   CREATININE 1.1   CALCIUM 7.8*   MG 1.3*     CBC:   Recent Labs   Lab 11/18/23  1647 11/19/23  0540 11/20/23  0457   WBC 14.80* 14.08* 12.86*    HGB 14.0 12.9* 12.1*   HCT 41.7 38.0* 36.4*    196 221     CMP:   Recent Labs   Lab 11/18/23  1647 11/19/23  0540 11/20/23  0457    135* 137   K 4.2 4.0 3.7    103 105   CO2 26 21* 26   GLU 88 82 106   BUN 12 11 14   CREATININE 1.0 0.9 1.1   CALCIUM 8.4* 8.2* 7.8*   PROT  --  7.2 6.4   ALBUMIN  --  3.5 3.0*   BILITOT  --  0.6 0.3   ALKPHOS  --  55 58   AST  --  31 18   ALT  --  20 18   ANIONGAP 5* 11 6*       Significant Imaging: I have reviewed all pertinent imaging results/findings within the past 24 hours.

## 2023-11-20 NOTE — ANESTHESIA PROCEDURE NOTES
Intubation    Date/Time: 11/20/2023 10:37 AM    Performed by: Tj Weinberg CRNA  Authorized by: Carlos Alberto Bender MD    Intubation:     Induction:  Intravenous    Intubated:  Postinduction    Mask Ventilation:  Easy mask    Attempts:  1    Attempted By:  CRNA    Method of Intubation:  Video laryngoscopy    Blade:  Blackwell 3    Laryngeal View Grade: Grade I - full view of cords      Difficult Airway Encountered?: No      Complications:  None    Airway Device:  Oral endotracheal tube    Airway Device Size:  8.5    Style/Cuff Inflation:  Cuffed (inflated to minimal occlusive pressure)    Inflation Amount (mL):  5    Tube secured:  22    Secured at:  The lips    Placement Verified By:  Capnometry    Complicating Factors:  None    Findings Post-Intubation:  BS equal bilateral

## 2023-11-20 NOTE — HOSPITAL COURSE
Patient was admitted with chest pain and CT chest showed  patchy nodular infiltrates, one of which is cavitary in the left lower lobe.  The patient is immunosuppressed from his Remicade injections for his ulcerative colitis.  He was started on empiric antibiotic therapy.  He was evaluated by Infectious Disease.  Cultures have been negative so far.  He had bronchoscopy by Pulmonary on 11/20.  Will have KATJA by Cardiology on 11/21. BAL cultures negative. KOH and AFB cultures negative. Patient was treated with IV Levaquin and doxycycline. PO vancomycin given for C diff prophylaxis. Patient developed a generalized macular papular rash mainly in the abdomen. Patient will need to follow with rheumatology. Cleared for discharge by pulmonary and ID. He does have leukocytosis but procalcition improved. DC antibiotics Levaquin and Doxy until 11/30 with PO Vancomycin as per ID recommendations.

## 2023-11-20 NOTE — TRANSFER OF CARE
"Anesthesia Transfer of Care Note    Patient: Jacoby Jean-Baptiste    Procedure(s) Performed: Procedure(s) (LRB):  BRONCHOSCOPY, WITH FLUOROSCOPY (Left)    Patient location: PACU    Anesthesia Type: general    Transport from OR: Transported from OR on 6-10 L/min O2 by face mask with adequate spontaneous ventilation    Post pain: adequate analgesia    Post assessment: tolerated procedure well and no apparent anesthetic complications    Post vital signs: stable    Level of consciousness: awake and alert    Nausea/Vomiting: no nausea/vomiting    Complications: none    Transfer of care protocol was followed      Last vitals: Visit Vitals  /63 (BP Location: Left arm)   Pulse 81   Temp 36.1 °C (96.9 °F) (Oral)   Resp 20   Ht 5' 5" (1.651 m)   Wt 132.9 kg (292 lb 15.9 oz)   SpO2 99%   BMI 48.76 kg/m²     "

## 2023-11-20 NOTE — CARE UPDATE
11/20/23 0826   Patient Assessment/Suction   Level of Consciousness (AVPU) alert   Respiratory Effort Normal   Expansion/Accessory Muscles/Retractions no use of accessory muscles   All Lung Fields Breath Sounds clear;diminished   Rhythm/Pattern, Respiratory unlabored   Cough Frequency no cough   PRE-TX-O2   Device (Oxygen Therapy) room air   SpO2 97 %   Pulse Oximetry Type Intermittent   $ Pulse Oximetry - Multiple Charge Pulse Oximetry - Multiple   Pulse 84   Resp 17   Aerosol Therapy   $ Aerosol Therapy Charges Aerosol Treatment   Daily Review of Necessity (SVN) completed   Respiratory Treatment Status (SVN) given   Treatment Route (SVN) mask   Patient Position (SVN) HOB elevated   Post Treatment Assessment (SVN) breath sounds unchanged   Signs of Intolerance (SVN) none   Breath Sounds Post-Respiratory Treatment   Throughout All Fields Post-Treatment All Fields   Throughout All Fields Post-Treatment no change   Post-treatment Heart Rate (beats/min) 80   Post-treatment Resp Rate (breaths/min) 17   Education   $ Education Bronchodilator;15 min

## 2023-11-20 NOTE — PROGRESS NOTES
KATJA was canceled today as procedure consent could not be obtained from patient as patient went to the bronchoscopy earlier today.  KATJA rescheduled to tomorrow.

## 2023-11-20 NOTE — RESPIRATORY THERAPY
11/20/23 0046   Patient Assessment/Suction   Level of Consciousness (AVPU) alert   Respiratory Effort Normal;Unlabored   Expansion/Accessory Muscles/Retractions no use of accessory muscles   All Lung Fields Breath Sounds Anterior:;coarse   Rhythm/Pattern, Respiratory unlabored;pattern regular;depth regular;no shortness of breath reported   Cough Frequency infrequent   PRE-TX-O2   Device (Oxygen Therapy) room air   SpO2 98 %   Pulse Oximetry Type Intermittent   $ Pulse Oximetry - Multiple Charge Pulse Oximetry - Multiple   Pulse 76   Resp 17   Aerosol Therapy   $ Aerosol Therapy Charges Aerosol Treatment   Daily Review of Necessity (SVN) completed   Respiratory Treatment Status (SVN) given   Treatment Route (SVN) mask;oxygen   Patient Position (SVN) HOB elevated   Post Treatment Assessment (SVN) breath sounds unchanged   Signs of Intolerance (SVN) none   Breath Sounds Post-Respiratory Treatment   Post-treatment Heart Rate (beats/min) 76   Post-treatment Resp Rate (breaths/min) 17   Education   $ Education Bronchodilator;DME Nebulizer;DME Oxygen;15 min

## 2023-11-20 NOTE — ANESTHESIA PREPROCEDURE EVALUATION
11/20/2023  Jacoby Jean-Baptiste is a 48 y.o., male.           Tobacco Use:  The patient  reports that he has been smoking cigarettes. He has a 30.0 pack-year smoking history. He has never used smokeless tobacco.     Results for orders placed or performed during the hospital encounter of 11/17/23   EKG 12-LEAD    Collection Time: 11/19/23  7:14 AM    Narrative    Test Reason : I48.91,I48.92,    Vent. Rate : 105 BPM     Atrial Rate : 105 BPM     P-R Int : 130 ms          QRS Dur : 074 ms      QT Int : 330 ms       P-R-T Axes : 057 019 031 degrees     QTc Int : 436 ms    Sinus tachycardia  Nonspecific T wave abnormality  Abnormal ECG  When compared with ECG of 17-NOV-2023 09:57,  Questionable change in QRS duration  Confirmed by Mynor RENTERIA, Harris ONEILL ([id number]) on 11/19/2023 3:43:52 PM    Referred By: [email or web address]   SELF           Confirmed By:Harris Lowe MD        Imaging Results              CT Abdomen Pelvis With IV Contrast (Final result)  Result time 11/17/23 12:27:10      Final result by Adrian Lynch MD (11/17/23 12:27:10)                   Narrative:    CMS MANDATED QUALITY DATA - CT RADIATION  436    All CT scans at this facility utilize dose modulation, iterative reconstruction, and/or weight based dosing when appropriate to reduce radiation dose to as low as reasonably achievable.    CLINICAL HISTORY:  48 years (1975) Male Pulmonary embolism (PE) suspected, high prob    TECHNIQUE:  CT CHEST ANGIOGRAPHY WITH IV CONTRAST, CT ABDOMEN PELVIS WITH IV CONTRAST.  Axial CT examination of the chest with attention to the pulmonary arteries was performed using contiguous axial images from the diaphragms to the lung apices following the intravenous administration of non-ionic contrast material.  Images were reviewed using lung, mediastinal, and bone windows. The study was performed with thin sections with  subsequent 3-D reformations, mid and reconstructions at multiple planes with images were stored in the PACS system. Subsequently axial CT of the abdomen and pelvis was obtained.    COMPARISON:  None available.    FINDINGS:  CTA PE evaluation: This is a low quality study for the evaluation of pulmonary embolism secondary to a combination of contrast bolus timing and motion artifact. The pulmonary arteries are normal in appearance without pulmonary emboli noted up to the central main arterial level, noting the limitations of CT technique for identifying small or isolated subsegmental emboli.    CT Chest:  Visualized neck: Within normal limits.  Lungs: Very mild central hilar groundglass attenuation opacity is present. In addition there is ill-defined nodular densities as outlined below:  -6 mm left apical pulmonary nodule (image 83)  -6 mm and adjacent 7 mm pulmonary nodule in the left lower lobe (image 169)  -11 mm soft tissue pulmonary nodule in the left lower lobe (image 205)  -9 mm and adjacent 8mm cavitary pulmonary nodule in the adjacent left lower lobe (image 211)  Airway: The trachea and central bronchial tree appear normal.  Pleura: There is no pleural effusion. There is no pneumothorax.  Cardiovascular: The heart is normal in size. There are no findings of right heart failure. The pulmonary artery is normal in size. There is no pericardial effusion. The thoracic aorta and great vessels are normal in course and caliber.  Mediastinum: Small lymph nodes are present, for example:  -15 x 10 mm AP window node (image 134)  -21 x 12 mm right precarinal node (image 1:15)  -17 x 10 mm subcarinal node (image 136)  Soft tissues: The peripheral soft tissues appear normal.  Musculoskeletal: The visualized osseous structures appear normal.  There are no suspicious osseous lesions.  Esophagus: The esophagus appears grossly normal.    CT Abdomen:  Liver: Relative diffuse low-attenuation liver consistent with hepatic  steatosis. The liver is enlarged measuring 19.7 cm (sagittal right lobe).  Gallbladder: The gallbladder is surgically absent.  Biliary Tree: No intra or extrahepatic ductal dilation.  Spleen: Within normal limits.  Pancreas: There is a moderate-sized periampullary duodenal diverticulum. No ductal dilation, differential enhancement or mass is identified.  Adrenal Glands: The adrenal glands appear within normal limits.  Kidneys: No hydronephrosis/hydroureter or evidence of obstructive uropathy. There is a 4 mm nonobstructing stone in the inferior pole the left kidney and 2 mm nonobstructing stone in the inferior pole the right kidney (image 80 and 83 respectively). There mild faint striations in the renal nephrograms.  Vasculature: The aorta is normal in course and caliber.  Lymph nodes: No abdominal lymphadenopathy is seen.  Intraperitoneal structures: There is no ascites.  Bowel: Scattered sigmoid diverticula are present without focal diverticulitis. There is a rim calcified structure adjacent to the cecum (image 150) containing fat suggesting sequelae of old infection/trauma or a remote torsed epiploic appendage. The bowel is normal in course and caliber with no finding of obstruction, intra-abdominal free air or abscess. The appendix is normal (image 141)  Abdominal wall: The abdominal wall and musculature are normal.  Musculoskeletal: No acute osseous abnormality is identified.    CT Pelvis:  Bladder: The urinary bladder is within normal limits.  Reproductive Organs: The prostate and seminal vesicles are within normal limits.  Pelvic Lymph nodes: No pelvic lymphadenopathy or pelvic mass is identified.    IMPRESSION:  1. No evidence of pulmonary embolism to the central main arterial level. If there is continued clinical concern, consider further evaluation with lower extremity doppler or repeat imaging.  2. Scattered ill-defined nodular densities in the lungs, most noticeably in the left lower lobe, some of which  show central cavitation suspicious for septic emboli and/or necrosis. Consider correlation with the symptoms, history and possible echocardiogram.  3. Faint striations in the renal nephrograms, possibly secondary to phase of contrast or mild pyelonephritis, consider correlation with urinalysis.  4. Small bilateral nonobstructing renal stones.  5. Hepatomegaly background parenchyma findings a steatosis.  6. Prior cholecystectomy with no intra-/extrahepatic biliary ductal dilation.                    .    Electronically signed by:  Adrian Lynch MD  11/17/2023 12:27 PM [facility] Workstation: [id number]                                     CTA Chest Non-Coronary (PE Studies) (Final result)  Result time 11/17/23 12:27:10      Final result by Adrian Lynch MD (11/17/23 12:27:10)                   Narrative:    CMS MANDATED QUALITY DATA - CT RADIATION  436    All CT scans at this facility utilize dose modulation, iterative reconstruction, and/or weight based dosing when appropriate to reduce radiation dose to as low as reasonably achievable.    CLINICAL HISTORY:  48 years (1975) Male Pulmonary embolism (PE) suspected, high prob    TECHNIQUE:  CT CHEST ANGIOGRAPHY WITH IV CONTRAST, CT ABDOMEN PELVIS WITH IV CONTRAST.  Axial CT examination of the chest with attention to the pulmonary arteries was performed using contiguous axial images from the diaphragms to the lung apices following the intravenous administration of non-ionic contrast material.  Images were reviewed using lung, mediastinal, and bone windows. The study was performed with thin sections with subsequent 3-D reformations, mid and reconstructions at multiple planes with images were stored in the PACS system. Subsequently axial CT of the abdomen and pelvis was obtained.    COMPARISON:  None available.    FINDINGS:  CTA PE evaluation: This is a low quality study for the evaluation of pulmonary embolism secondary to a combination of contrast bolus timing and  motion artifact. The pulmonary arteries are normal in appearance without pulmonary emboli noted up to the central main arterial level, noting the limitations of CT technique for identifying small or isolated subsegmental emboli.    CT Chest:  Visualized neck: Within normal limits.  Lungs: Very mild central hilar groundglass attenuation opacity is present. In addition there is ill-defined nodular densities as outlined below:  -6 mm left apical pulmonary nodule (image 83)  -6 mm and adjacent 7 mm pulmonary nodule in the left lower lobe (image 169)  -11 mm soft tissue pulmonary nodule in the left lower lobe (image 205)  -9 mm and adjacent 8mm cavitary pulmonary nodule in the adjacent left lower lobe (image 211)  Airway: The trachea and central bronchial tree appear normal.  Pleura: There is no pleural effusion. There is no pneumothorax.  Cardiovascular: The heart is normal in size. There are no findings of right heart failure. The pulmonary artery is normal in size. There is no pericardial effusion. The thoracic aorta and great vessels are normal in course and caliber.  Mediastinum: Small lymph nodes are present, for example:  -15 x 10 mm AP window node (image 134)  -21 x 12 mm right precarinal node (image 1:15)  -17 x 10 mm subcarinal node (image 136)  Soft tissues: The peripheral soft tissues appear normal.  Musculoskeletal: The visualized osseous structures appear normal.  There are no suspicious osseous lesions.  Esophagus: The esophagus appears grossly normal.    CT Abdomen:  Liver: Relative diffuse low-attenuation liver consistent with hepatic steatosis. The liver is enlarged measuring 19.7 cm (sagittal right lobe).  Gallbladder: The gallbladder is surgically absent.  Biliary Tree: No intra or extrahepatic ductal dilation.  Spleen: Within normal limits.  Pancreas: There is a moderate-sized periampullary duodenal diverticulum. No ductal dilation, differential enhancement or mass is identified.  Adrenal Glands: The  adrenal glands appear within normal limits.  Kidneys: No hydronephrosis/hydroureter or evidence of obstructive uropathy. There is a 4 mm nonobstructing stone in the inferior pole the left kidney and 2 mm nonobstructing stone in the inferior pole the right kidney (image 80 and 83 respectively). There mild faint striations in the renal nephrograms.  Vasculature: The aorta is normal in course and caliber.  Lymph nodes: No abdominal lymphadenopathy is seen.  Intraperitoneal structures: There is no ascites.  Bowel: Scattered sigmoid diverticula are present without focal diverticulitis. There is a rim calcified structure adjacent to the cecum (image 150) containing fat suggesting sequelae of old infection/trauma or a remote torsed epiploic appendage. The bowel is normal in course and caliber with no finding of obstruction, intra-abdominal free air or abscess. The appendix is normal (image 141)  Abdominal wall: The abdominal wall and musculature are normal.  Musculoskeletal: No acute osseous abnormality is identified.    CT Pelvis:  Bladder: The urinary bladder is within normal limits.  Reproductive Organs: The prostate and seminal vesicles are within normal limits.  Pelvic Lymph nodes: No pelvic lymphadenopathy or pelvic mass is identified.    IMPRESSION:  1. No evidence of pulmonary embolism to the central main arterial level. If there is continued clinical concern, consider further evaluation with lower extremity doppler or repeat imaging.  2. Scattered ill-defined nodular densities in the lungs, most noticeably in the left lower lobe, some of which show central cavitation suspicious for septic emboli and/or necrosis. Consider correlation with the symptoms, history and possible echocardiogram.  3. Faint striations in the renal nephrograms, possibly secondary to phase of contrast or mild pyelonephritis, consider correlation with urinalysis.  4. Small bilateral nonobstructing renal stones.  5. Hepatomegaly background  parenchyma findings a steatosis.  6. Prior cholecystectomy with no intra-/extrahepatic biliary ductal dilation.                    .    Electronically signed by:  Adrian Lynch MD  11/17/2023 12:27 PM CST Workstation: [id number]                                     X-Ray Chest AP Portable (Final result)  Result time 11/17/23 08:47:16      Final result by Rickie Brown MD (11/17/23 08:47:16)                   Narrative:    XR CHEST 1 VIEW    CLINICAL HISTORY:  48 years Male Chest Pain    COMPARISON: April 20, 2023    FINDINGS: Cardiac silhouette size is stable compared to prior. No pulmonary edema, confluent airspace disease, pleural effusion, or pneumothorax. No acute osseous abnormality.    IMPRESSION:    No acute pulmonary pathology.    Electronically signed by:  Rickie Brown MD  11/17/2023 08:47 AM CST Workstation: [id number]                                     Lab Results   Component Value Date    WBC 12.86 (H) 11/20/2023    HGB 12.1 (L) 11/20/2023    HCT 36.4 (L) 11/20/2023     (H) 11/20/2023     11/20/2023     BMP  Lab Results   Component Value Date     11/20/2023    K 3.7 11/20/2023     11/20/2023    CO2 26 11/20/2023    BUN 14 11/20/2023    CREATININE 1.1 11/20/2023    CALCIUM 7.8 (L) 11/20/2023    ANIONGAP 6 (L) 11/20/2023     11/20/2023    GLU 88 11/18/2023     (H) 11/18/2023       Results for orders placed during the hospital encounter of 11/17/23    Echo    Interpretation Summary    Left Ventricle: The left ventricle is normal in size. Normal wall thickness. There is concentric remodeling. Normal wall motion. There is normal systolic function with a visually estimated ejection fraction of 55 - 60%. There is normal diastolic function.    Right Ventricle: Normal right ventricular cavity size. Wall thickness is normal. Right ventricle wall motion  is normal. Systolic function is normal.    IVC/SVC: Normal venous pressure at 3 mmHg.         Pre-op  Assessment    I have reviewed the Patient Summary Reports.     I have reviewed the Nursing Notes. I have reviewed the NPO Status.   I have reviewed the Medications.     Review of Systems  Anesthesia Hx:  No problems with previous Anesthesia             Denies Family Hx of Anesthesia complications.    Denies Personal Hx of Anesthesia complications.                    Social:  Alcohol Use, Smoker Admission tox screen positive for opiates and marijuana      Hematology/Oncology:    Oncology Normal    -- Anemia:                                  EENT/Dental:  EENT/Dental Normal           Cardiovascular:     Hypertension, well controlled    Dysrhythmias       hyperlipidemia   ECG has been reviewed. H/O cardiac radiofrequency ablation for supraventricular tachycardia.      Shortness of Breath                    Hypertension         Pulmonary:  Pneumonia (bilateral)    Shortness of breath   Denies Sleep Apnea. Possible Septic embolism to the lungs.                     Renal/:  Chronic Renal Disease renal calculi       Kidney Function/Disease             Hepatic/GI:   PUD,     Ulcerative colitis.  Ozempic Therapy, last 11/11/2023.  Patient denies any nausea or vomiting today or yesterday.    Peptic Ulcer Disease   Bowel Conditions:  Inflammatory Bowel Disease, Ulcerative Colitis        Musculoskeletal:  Musculoskeletal Normal                Neurological:    Denies CVA.        Left-sided weakness per chart but patient denies                            Endocrine:  Diabetes, poorly controlled, type 2    Diabetes                    Morbid Obesity / BMI > 40  Psych:  Psychiatric History anxiety  Sleep Disorder.       Sleep Disorder.        Physical Exam  General: Cooperative, Alert and Oriented    Airway:  Mallampati: IV / III  Mouth Opening: Normal  TM Distance: > 6 cm  Tongue: Normal  Neck ROM: Normal ROM    Dental:  Intact    Chest/Lungs:  Clear to auscultation, Normal Respiratory Rate    Heart:  Rate: Normal  Rhythm: Regular  Rhythm  Sounds: Normal        Anesthesia Plan  Type of Anesthesia, risks & benefits discussed:    Anesthesia Type: Gen ETT  Intra-op Monitoring Plan: Standard ASA Monitors  Induction:  IV  Airway Plan: Video, Post-Induction  ASA Score: 4  Anesthesia Plan Notes: GETA  Minimize Versed and fentanyl  TIVA    Ready For Surgery From Anesthesia Perspective.     .

## 2023-11-20 NOTE — PROCEDURES
18 Gx 10cm PowerGlide Midline placed to pts LEFT cephalic vein with the use of ultrasound guidance.    Ultrasound guidance: yes  Vessel Caliber: large and patent, compressibility normal  Needle advanced into vessel with real time Ultrasound guidance.  Guidewire confirmed in vessel.  Sterile sheath used.  Sterile dressing applied  Dressing dated   Education provided to patient re: proper maintenance of line- pt verbalized understanding

## 2023-11-20 NOTE — PROGRESS NOTES
[facility]  Progress Note    Patient Name: Jacoby Jean-Baptiste  MRN: [id number]  Patient Class: IP- Inpatient   Admission Date: 11/17/2023  Length of Stay: 3 days  Attending Physician: Мария Durham MD  Primary Care Provider: Hunter Aguilar III, MD        Subjective:     Principal Problem:Septic embolism        HPI:  Pt is a 48 year old male with a PMH of DM2, anemia, tobacco use, HLD, and anxiety. Pt presents to ED today after complaints of SOB, chest pain, nausea/vomiting, and abdominal pain x1 week. Pt states he has had progressively worsening symptoms and unable to find relief on own over the last week. Pt has history of colitis, CT of abdomen shows potential mild pyelonephritis. Pt does not have CVA tenderness on exam, creatinine 1.1, and UA not diagnostic. Lipase 8, negative flu/covid, and WBC 12.53. CTA of chest shows no evidence of PE but does have L lower lobe suspicion for septic emboli and/or necrosis. Pt has SOB and requires 2L O2 to keep >90%. Pt given nebulizer treatment in ED and has improvement with SOB. Pt on exam states he is feeling better and most symptoms have diminished including chest pain, SOB, nausea, and abdominal pain. Pt started on antibiotics in ED but pt refused due to hx of cdiff and GI giving him antibiotic free window to abide by. Discussed risk vs benefits with new onset sepsis. Cardiology consulted and echo pending. Pt states he stopped smoking approximately 1 week ago, denies alcohol use, and illicit drug use. Full code.     Overview/Hospital Course:  No notes on file    Interval History:  His headache is improved.  Denies chest pain or palpitations.  His cough is ongoing but also improved.  Discussed case with ID.    Review of Systems Complete ROS otherwise negative other than stated in HPI.   Objective:     Vital Signs (Most Recent):  Temp: 96.2 °F (35.7 °C) (11/20/23 1231)  Pulse: (P) 97 (11/20/23 1258)  Resp: 16 (11/20/23 1313)  BP: (!) 155/70  (11/20/23 1258)  SpO2: 95 % (11/20/23 1231) Vital Signs (24h Range):  Temp:  [96.2 °F (35.7 °C)-97.9 °F (36.6 °C)] 96.2 °F (35.7 °C)  Pulse:  [] (P) 97  Resp:  [16-20] 16  SpO2:  [92 %-100 %] 95 %  BP: (116-155)/(63-93) 155/70     Weight: 132.9 kg (292 lb 15.9 oz)  Body mass index is 48.76 kg/m².    Intake/Output Summary (Last 24 hours) at 11/20/2023 1358  Last data filed at 11/20/2023 1235  Gross per 24 hour   Intake 120 ml   Output 0 ml   Net 120 ml         Physical Exam  GENERAL:  Alert and oriented x 3  HEENT:  EOMI. Conjunctivae intact.   NECK:  Supple   LUNGS:  No respiratory distress. Clear to auscultation bilaterally with good air movement  CARDIAC:  RRR without murmur, rub or gallop  ABDOMEN:  Soft,  Nontender and nondistended, no rebound or guarding, bowel sounds present   EXTREMITIES:  Peripheral pulses are 2+. Hands and feet are warm. Good capillary refill in fingers (< 2 seconds). No clubbing, cyanosis or edema  SKIN:  Skin color, texture and turgor normal. No rashes, ulcerations or nodules noted  NEUROLOGIC:  CN II-XII intact. Light touch sensation intact. Muscle strength 5/5 in all extremities        Significant Labs: All pertinent labs within the past 24 hours have been reviewed.  Blood Culture:   Recent Labs   Lab 11/19/23  0539   LABBLOO No Growth to date  No Growth to date     BMP:   Recent Labs   Lab 11/20/23  0457         K 3.7      CO2 26   BUN 14   CREATININE 1.1   CALCIUM 7.8*   MG 1.3*     CBC:   Recent Labs   Lab 11/18/23  1647 11/19/23  0540 11/20/23  0457   WBC 14.80* 14.08* 12.86*   HGB 14.0 12.9* 12.1*   HCT 41.7 38.0* 36.4*    196 221     CMP:   Recent Labs   Lab 11/18/23  1647 11/19/23  0540 11/20/23  0457    135* 137   K 4.2 4.0 3.7    103 105   CO2 26 21* 26   GLU 88 82 106   BUN 12 11 14   CREATININE 1.0 0.9 1.1   CALCIUM 8.4* 8.2* 7.8*   PROT  --  7.2 6.4   ALBUMIN  --  3.5 3.0*   BILITOT  --  0.6 0.3   ALKPHOS  --  55 58   AST  --  31  18   ALT  --  20 18   ANIONGAP 5* 11 6*       Significant Imaging: I have reviewed all pertinent imaging results/findings within the past 24 hours.    Assessment/Plan:        * Septic embolism of lung  CTA of chest shows septic emboli and/or necrosis   All cultures and infectious disease workup negative so far, multiple still pending  Does have opioids on drug screen  Continue antibiotics per ID.  Developed drug rash  CTA head looked good.  Continue Fioricet as needed for headache  Bronchoscopy today per Pulmonary  KATJA tomorrow        Chest pain  Resolved.  cont telemetry monitoring      Shortness of breath  resolved  Nebulizer treatments   Continuous pulse oximetry         Hypomagnesemia  Trend electrolytes and replete PRN     Polysubstance abuse  Urine toxicology with opioids and marijuana       Anxiety  Continue home regimen      Type 2 diabetes mellitus without complication, without long-term current use of insulin  POCT glucose   Insulin sliding scale .  Controlled.  Refuses diabetic diet     Hyperlipidemia   Continue statin         Morbid obesity  Resistant to lifestyle modifications     Tobacco use  Pt states he quit smoking last Saturday   Denies nicotine patch    VTE Risk Mitigation (From admission, onward)           Ordered     enoxaparin injection 40 mg  Every 12 hours         11/18/23 1036     IP VTE HIGH RISK PATIENT  Once         11/17/23 1308     Place sequential compression device  Until discontinued         11/17/23 1308                    Discharge Planning   GERARDO: 11/23/2023     Code Status: Full Code   Is the patient medically ready for discharge?:     Reason for patient still in hospital (select all that apply): Patient trending condition, Treatment, Imaging, and Consult recommendations  Discharge Plan A: Home                  Мария Durham MD  Department of Hospital Medicine   [facility]

## 2023-11-20 NOTE — ANESTHESIA POSTPROCEDURE EVALUATION
Anesthesia Post Evaluation    Patient: Jacoby Jean-Baptiste    Procedure(s) Performed: Procedure(s) (LRB):  BRONCHOSCOPY, WITH FLUOROSCOPY (Left)    Final Anesthesia Type: general      Patient location: OR1.  Patient participation: Yes- Able to Participate  Level of consciousness: awake and alert  Post-procedure vital signs: reviewed and stable  Pain management: adequate  Airway patency: patent  LOLIS mitigation strategies: Extubation while patient is awake, Multimodal analgesia and Extubation and recovery carried out in lateral, semiupright, or other nonsupine position  PONV status at discharge: No PONV  Anesthetic complications: no      Cardiovascular status: stable  Respiratory status: unassisted and spontaneous ventilation  Hydration status: euvolemic  Follow-up not needed.          Vitals Value Taken Time   /70 11/20/23 1258   Temp 35.7 °C (96.2 °F) 11/20/23 1231   Pulse 102 11/20/23 1231   Resp 16 11/20/23 1313   SpO2 95 % 11/20/23 1231         Event Time   Out of Recovery 11/20/2023 12:24:00         Pain/Kusum Score: Pain Rating Prior to Med Admin: 6 (11/20/2023  1:13 PM)  Kusum Score: 9 (11/20/2023 12:15 PM)

## 2023-11-21 ENCOUNTER — ANESTHESIA EVENT (OUTPATIENT)
Dept: CARDIOLOGY | Facility: HOSPITAL | Age: 48
DRG: 871 | End: 2023-11-21
Payer: COMMERCIAL

## 2023-11-21 ENCOUNTER — CLINICAL SUPPORT (OUTPATIENT)
Dept: CARDIOLOGY | Facility: HOSPITAL | Age: 48
DRG: 871 | End: 2023-11-21
Attending: EMERGENCY MEDICINE
Payer: COMMERCIAL

## 2023-11-21 ENCOUNTER — ANESTHESIA (OUTPATIENT)
Dept: CARDIOLOGY | Facility: HOSPITAL | Age: 48
DRG: 871 | End: 2023-11-21
Payer: COMMERCIAL

## 2023-11-21 VITALS
OXYGEN SATURATION: 100 % | DIASTOLIC BLOOD PRESSURE: 50 MMHG | SYSTOLIC BLOOD PRESSURE: 107 MMHG | HEART RATE: 95 BPM | RESPIRATION RATE: 16 BRPM

## 2023-11-21 LAB
ALBUMIN SERPL BCP-MCNC: 3.1 G/DL (ref 3.5–5.2)
ALP SERPL-CCNC: 65 U/L (ref 55–135)
ALT SERPL W/O P-5'-P-CCNC: 17 U/L (ref 10–44)
ANA HOMOGEN TITR SER: ABNORMAL {TITER}
ANA NOTE: ABNORMAL
ANA TITR SER IF: POSITIVE {TITER}
ANION GAP SERPL CALC-SCNC: 9 MMOL/L (ref 8–16)
AST SERPL-CCNC: 15 U/L (ref 10–40)
BASOPHILS # BLD AUTO: 0.06 K/UL (ref 0–0.2)
BASOPHILS NFR BLD: 0.5 % (ref 0–1.9)
BILIRUB SERPL-MCNC: 0.6 MG/DL (ref 0.1–1)
BSA FOR ECHO PROCEDURE: 2.45 M2
BUN SERPL-MCNC: 10 MG/DL (ref 6–20)
CALCIUM SERPL-MCNC: 8.3 MG/DL (ref 8.7–10.5)
CHLORIDE SERPL-SCNC: 104 MMOL/L (ref 95–110)
CO2 SERPL-SCNC: 24 MMOL/L (ref 23–29)
CREAT SERPL-MCNC: 0.8 MG/DL (ref 0.5–1.4)
DIFFERENTIAL METHOD: ABNORMAL
EOSINOPHIL # BLD AUTO: 1 K/UL (ref 0–0.5)
EOSINOPHIL NFR BLD: 8 % (ref 0–8)
ERYTHROCYTE [DISTWIDTH] IN BLOOD BY AUTOMATED COUNT: 13.9 % (ref 11.5–14.5)
EST. GFR  (NO RACE VARIABLE): >60 ML/MIN/1.73 M^2
GLUCOSE SERPL-MCNC: 102 MG/DL (ref 70–110)
GLUCOSE SERPL-MCNC: 102 MG/DL (ref 70–110)
GLUCOSE SERPL-MCNC: 110 MG/DL (ref 70–110)
GLUCOSE SERPL-MCNC: 89 MG/DL (ref 70–110)
GLUCOSE SERPL-MCNC: 94 MG/DL (ref 70–110)
HBV SURFACE AG SERPL QL IA: NEGATIVE
HCT VFR BLD AUTO: 36.2 % (ref 40–54)
HGB BLD-MCNC: 11.9 G/DL (ref 14–18)
HIV 1+2 AB+HIV1 P24 AG SERPL QL IA: NON REACTIVE
IMM GRANULOCYTES # BLD AUTO: 0.1 K/UL (ref 0–0.04)
IMM GRANULOCYTES NFR BLD AUTO: 0.8 % (ref 0–0.5)
LYMPHOCYTES # BLD AUTO: 1.2 K/UL (ref 1–4.8)
LYMPHOCYTES NFR BLD: 9.7 % (ref 18–48)
MAGNESIUM SERPL-MCNC: 1.3 MG/DL (ref 1.6–2.6)
MCH RBC QN AUTO: 33.3 PG (ref 27–31)
MCHC RBC AUTO-ENTMCNC: 32.9 G/DL (ref 32–36)
MCV RBC AUTO: 101 FL (ref 82–98)
MONOCYTES # BLD AUTO: 1.8 K/UL (ref 0.3–1)
MONOCYTES NFR BLD: 14 % (ref 4–15)
NEUTROPHILS # BLD AUTO: 8.4 K/UL (ref 1.8–7.7)
NEUTROPHILS NFR BLD: 67 % (ref 38–73)
NRBC BLD-RTO: 0 /100 WBC
PHOSPHATE SERPL-MCNC: 2 MG/DL (ref 2.7–4.5)
PLATELET # BLD AUTO: 273 K/UL (ref 150–450)
PMV BLD AUTO: 10.6 FL (ref 9.2–12.9)
POTASSIUM SERPL-SCNC: 3.4 MMOL/L (ref 3.5–5.1)
PROT SERPL-MCNC: 6.6 G/DL (ref 6–8.4)
RBC # BLD AUTO: 3.57 M/UL (ref 4.6–6.2)
SODIUM SERPL-SCNC: 137 MMOL/L (ref 136–145)
WBC # BLD AUTO: 12.56 K/UL (ref 3.9–12.7)

## 2023-11-21 PROCEDURE — 99232 PR SUBSEQUENT HOSPITAL CARE,LEVL II: ICD-10-PCS | Mod: ,,, | Performed by: INTERNAL MEDICINE

## 2023-11-21 PROCEDURE — 80053 COMPREHEN METABOLIC PANEL: CPT | Performed by: PHYSICAL THERAPY ASSISTANT

## 2023-11-21 PROCEDURE — 93325 TRANSESOPHAGEAL ECHO (TEE) (CUPID ONLY): ICD-10-PCS | Mod: 26,,, | Performed by: INTERNAL MEDICINE

## 2023-11-21 PROCEDURE — 21400001 HC TELEMETRY ROOM

## 2023-11-21 PROCEDURE — 93312 TRANSESOPHAGEAL ECHO (TEE) (CUPID ONLY): ICD-10-PCS | Mod: 26,,, | Performed by: INTERNAL MEDICINE

## 2023-11-21 PROCEDURE — 82962 GLUCOSE BLOOD TEST: CPT

## 2023-11-21 PROCEDURE — 99233 PR SUBSEQUENT HOSPITAL CARE,LEVL III: ICD-10-PCS | Mod: ,,, | Performed by: STUDENT IN AN ORGANIZED HEALTH CARE EDUCATION/TRAINING PROGRAM

## 2023-11-21 PROCEDURE — D9220A PRA ANESTHESIA: Mod: ANES,,, | Performed by: ANESTHESIOLOGY

## 2023-11-21 PROCEDURE — D9220A PRA ANESTHESIA: ICD-10-PCS | Mod: CRNA,,, | Performed by: NURSE ANESTHETIST, CERTIFIED REGISTERED

## 2023-11-21 PROCEDURE — 63600175 PHARM REV CODE 636 W HCPCS: Performed by: STUDENT IN AN ORGANIZED HEALTH CARE EDUCATION/TRAINING PROGRAM

## 2023-11-21 PROCEDURE — 25000003 PHARM REV CODE 250: Performed by: HOSPITALIST

## 2023-11-21 PROCEDURE — 84100 ASSAY OF PHOSPHORUS: CPT | Performed by: PHYSICAL THERAPY ASSISTANT

## 2023-11-21 PROCEDURE — 63600175 PHARM REV CODE 636 W HCPCS: Performed by: HOSPITALIST

## 2023-11-21 PROCEDURE — D9220A PRA ANESTHESIA: ICD-10-PCS | Mod: ANES,,, | Performed by: ANESTHESIOLOGY

## 2023-11-21 PROCEDURE — 63600175 PHARM REV CODE 636 W HCPCS: Performed by: ANESTHESIOLOGY

## 2023-11-21 PROCEDURE — D9220A PRA ANESTHESIA: Mod: CRNA,,, | Performed by: NURSE ANESTHETIST, CERTIFIED REGISTERED

## 2023-11-21 PROCEDURE — 99232 SBSQ HOSP IP/OBS MODERATE 35: CPT | Mod: ,,, | Performed by: INTERNAL MEDICINE

## 2023-11-21 PROCEDURE — 25000003 PHARM REV CODE 250: Performed by: INTERNAL MEDICINE

## 2023-11-21 PROCEDURE — 25000003 PHARM REV CODE 250: Performed by: STUDENT IN AN ORGANIZED HEALTH CARE EDUCATION/TRAINING PROGRAM

## 2023-11-21 PROCEDURE — 63600175 PHARM REV CODE 636 W HCPCS: Performed by: NURSE ANESTHETIST, CERTIFIED REGISTERED

## 2023-11-21 PROCEDURE — 25000003 PHARM REV CODE 250: Performed by: NURSE ANESTHETIST, CERTIFIED REGISTERED

## 2023-11-21 PROCEDURE — 94761 N-INVAS EAR/PLS OXIMETRY MLT: CPT

## 2023-11-21 PROCEDURE — 25000003 PHARM REV CODE 250: Performed by: PHYSICAL THERAPY ASSISTANT

## 2023-11-21 PROCEDURE — 93312 ECHO TRANSESOPHAGEAL: CPT | Mod: 26,,, | Performed by: INTERNAL MEDICINE

## 2023-11-21 PROCEDURE — 99233 SBSQ HOSP IP/OBS HIGH 50: CPT | Mod: ,,, | Performed by: STUDENT IN AN ORGANIZED HEALTH CARE EDUCATION/TRAINING PROGRAM

## 2023-11-21 PROCEDURE — 25000242 PHARM REV CODE 250 ALT 637 W/ HCPCS: Performed by: INTERNAL MEDICINE

## 2023-11-21 PROCEDURE — 83735 ASSAY OF MAGNESIUM: CPT | Performed by: PHYSICAL THERAPY ASSISTANT

## 2023-11-21 PROCEDURE — 93320 DOPPLER ECHO COMPLETE: CPT | Mod: 26,,, | Performed by: INTERNAL MEDICINE

## 2023-11-21 PROCEDURE — 99900031 HC PATIENT EDUCATION (STAT)

## 2023-11-21 PROCEDURE — 93320 PR DOPPLER ECHO HEART,COMPLETE: ICD-10-PCS | Mod: 26,,, | Performed by: INTERNAL MEDICINE

## 2023-11-21 PROCEDURE — 36415 COLL VENOUS BLD VENIPUNCTURE: CPT | Performed by: PHYSICAL THERAPY ASSISTANT

## 2023-11-21 PROCEDURE — 93325 DOPPLER ECHO COLOR FLOW MAPG: CPT

## 2023-11-21 PROCEDURE — 85025 COMPLETE CBC W/AUTO DIFF WBC: CPT | Performed by: PHYSICAL THERAPY ASSISTANT

## 2023-11-21 PROCEDURE — 94640 AIRWAY INHALATION TREATMENT: CPT

## 2023-11-21 PROCEDURE — 93325 DOPPLER ECHO COLOR FLOW MAPG: CPT | Mod: 26,,, | Performed by: INTERNAL MEDICINE

## 2023-11-21 RX ORDER — TRIAMCINOLONE ACETONIDE 1 MG/G
CREAM TOPICAL 2 TIMES DAILY
Status: DISCONTINUED | OUTPATIENT
Start: 2023-11-21 | End: 2023-11-23 | Stop reason: HOSPADM

## 2023-11-21 RX ORDER — CODEINE PHOSPHATE AND GUAIFENESIN 10; 100 MG/5ML; MG/5ML
5 SOLUTION ORAL EVERY 4 HOURS PRN
Status: DISCONTINUED | OUTPATIENT
Start: 2023-11-21 | End: 2023-11-23 | Stop reason: HOSPADM

## 2023-11-21 RX ORDER — PROPOFOL 10 MG/ML
VIAL (ML) INTRAVENOUS
Status: DISCONTINUED | OUTPATIENT
Start: 2023-11-21 | End: 2023-11-21

## 2023-11-21 RX ORDER — NALOXONE HCL 0.4 MG/ML
VIAL (ML) INJECTION
Status: DISPENSED
Start: 2023-11-21 | End: 2023-11-21

## 2023-11-21 RX ORDER — FLUMAZENIL 0.1 MG/ML
INJECTION INTRAVENOUS
Status: DISPENSED
Start: 2023-11-21 | End: 2023-11-21

## 2023-11-21 RX ORDER — MAGNESIUM SULFATE HEPTAHYDRATE 40 MG/ML
2 INJECTION, SOLUTION INTRAVENOUS ONCE
Status: COMPLETED | OUTPATIENT
Start: 2023-11-21 | End: 2023-11-21

## 2023-11-21 RX ORDER — METOPROLOL SUCCINATE 50 MG/1
50 TABLET, EXTENDED RELEASE ORAL DAILY
Status: DISCONTINUED | OUTPATIENT
Start: 2023-11-21 | End: 2023-11-23 | Stop reason: HOSPADM

## 2023-11-21 RX ORDER — SODIUM CHLORIDE 9 MG/ML
INJECTION, SOLUTION INTRAVENOUS CONTINUOUS PRN
Status: DISCONTINUED | OUTPATIENT
Start: 2023-11-21 | End: 2023-11-21

## 2023-11-21 RX ORDER — LEVOFLOXACIN 5 MG/ML
750 INJECTION, SOLUTION INTRAVENOUS
Status: DISCONTINUED | OUTPATIENT
Start: 2023-11-21 | End: 2023-11-23 | Stop reason: HOSPADM

## 2023-11-21 RX ADMIN — ENOXAPARIN SODIUM 40 MG: 40 INJECTION SUBCUTANEOUS at 09:11

## 2023-11-21 RX ADMIN — PROMETHAZINE HYDROCHLORIDE 12.5 MG: 12.5 TABLET ORAL at 10:11

## 2023-11-21 RX ADMIN — PROPOFOL 30 MG: 10 INJECTION, EMULSION INTRAVENOUS at 12:11

## 2023-11-21 RX ADMIN — POTASSIUM BICARBONATE 35 MEQ: 391 TABLET, EFFERVESCENT ORAL at 01:11

## 2023-11-21 RX ADMIN — TRIAMCINOLONE ACETONIDE: 1 CREAM TOPICAL at 09:11

## 2023-11-21 RX ADMIN — LEVOFLOXACIN 750 MG: 5 INJECTION, SOLUTION INTRAVENOUS at 02:11

## 2023-11-21 RX ADMIN — DIPHENHYDRAMINE HYDROCHLORIDE 25 MG: 25 CAPSULE ORAL at 02:11

## 2023-11-21 RX ADMIN — HYDROCODONE BITARTRATE AND ACETAMINOPHEN 1 TABLET: 5; 325 TABLET ORAL at 02:11

## 2023-11-21 RX ADMIN — METOPROLOL SUCCINATE 50 MG: 50 TABLET, EXTENDED RELEASE ORAL at 05:11

## 2023-11-21 RX ADMIN — ENOXAPARIN SODIUM 40 MG: 40 INJECTION SUBCUTANEOUS at 02:11

## 2023-11-21 RX ADMIN — BUSPIRONE HYDROCHLORIDE 7.5 MG: 5 TABLET ORAL at 09:11

## 2023-11-21 RX ADMIN — POTASSIUM, SODIUM PHOSPHATES 280 MG-160 MG-250 MG ORAL POWDER PACKET 2 PACKET: POWDER IN PACKET at 06:11

## 2023-11-21 RX ADMIN — ATORVASTATIN CALCIUM 10 MG: 10 TABLET, FILM COATED ORAL at 09:11

## 2023-11-21 RX ADMIN — POTASSIUM BICARBONATE 35 MEQ: 391 TABLET, EFFERVESCENT ORAL at 05:11

## 2023-11-21 RX ADMIN — ZOLPIDEM TARTRATE 10 MG: 5 TABLET, COATED ORAL at 09:11

## 2023-11-21 RX ADMIN — TRIAMCINOLONE ACETONIDE: 1 CREAM TOPICAL at 02:11

## 2023-11-21 RX ADMIN — PROPOFOL 60 MG: 10 INJECTION, EMULSION INTRAVENOUS at 11:11

## 2023-11-21 RX ADMIN — DOXYCYCLINE 100 MG: 100 INJECTION, POWDER, LYOPHILIZED, FOR SOLUTION INTRAVENOUS at 08:11

## 2023-11-21 RX ADMIN — DIPHENHYDRAMINE HYDROCHLORIDE 25 MG: 25 CAPSULE ORAL at 03:11

## 2023-11-21 RX ADMIN — LEVALBUTEROL HYDROCHLORIDE 1.25 MG: 1.25 SOLUTION RESPIRATORY (INHALATION) at 12:11

## 2023-11-21 RX ADMIN — GUAIFENESIN AND CODEINE PHOSPHATE 5 ML: 100; 10 SOLUTION ORAL at 05:11

## 2023-11-21 RX ADMIN — MAGNESIUM SULFATE HEPTAHYDRATE 2 G: 40 INJECTION, SOLUTION INTRAVENOUS at 10:11

## 2023-11-21 RX ADMIN — LEVALBUTEROL HYDROCHLORIDE 1.25 MG: 1.25 SOLUTION RESPIRATORY (INHALATION) at 07:11

## 2023-11-21 RX ADMIN — DOXYCYCLINE 100 MG: 100 INJECTION, POWDER, LYOPHILIZED, FOR SOLUTION INTRAVENOUS at 03:11

## 2023-11-21 RX ADMIN — VANCOMYCIN HYDROCHLORIDE 125 MG: KIT at 09:11

## 2023-11-21 RX ADMIN — DIPHENHYDRAMINE HYDROCHLORIDE 25 MG: 25 CAPSULE ORAL at 09:11

## 2023-11-21 RX ADMIN — LEVALBUTEROL HYDROCHLORIDE 1.25 MG: 1.25 SOLUTION RESPIRATORY (INHALATION) at 03:11

## 2023-11-21 RX ADMIN — SODIUM CHLORIDE: 0.9 INJECTION, SOLUTION INTRAVENOUS at 11:11

## 2023-11-21 RX ADMIN — GUAIFENESIN AND CODEINE PHOSPHATE 5 ML: 100; 10 SOLUTION ORAL at 09:11

## 2023-11-21 RX ADMIN — HYDROCODONE BITARTRATE AND ACETAMINOPHEN 1 TABLET: 5; 325 TABLET ORAL at 09:11

## 2023-11-21 RX ADMIN — VARENICLINE TARTRATE 0.5 MG: 0.5 TABLET, FILM COATED ORAL at 09:11

## 2023-11-21 RX ADMIN — PANTOPRAZOLE SODIUM 40 MG: 40 TABLET, DELAYED RELEASE ORAL at 09:11

## 2023-11-21 RX ADMIN — BUSPIRONE HYDROCHLORIDE 7.5 MG: 5 TABLET ORAL at 05:11

## 2023-11-21 NOTE — ANESTHESIA PREPROCEDURE EVALUATION
11/21/2023  Jacoby Jean-Baptiste is a 48 y.o., male.           Tobacco Use:  The patient  reports that he has been smoking cigarettes. He has a 30.0 pack-year smoking history. He has never used smokeless tobacco.     Results for orders placed or performed during the hospital encounter of 11/17/23   EKG 12-LEAD    Collection Time: 11/19/23  7:14 AM    Narrative    Test Reason : I48.91,I48.92,    Vent. Rate : 105 BPM     Atrial Rate : 105 BPM     P-R Int : 130 ms          QRS Dur : 074 ms      QT Int : 330 ms       P-R-T Axes : 057 019 031 degrees     QTc Int : 436 ms    Sinus tachycardia  Nonspecific T wave abnormality  Abnormal ECG  When compared with ECG of 17-NOV-2023 09:57,  Questionable change in QRS duration  Confirmed by Mynor RENTERIA, Harris ONEILL ([id number]) on 11/19/2023 3:43:52 PM    Referred By: [email or web address]   SELF           Confirmed By:Harris Lowe MD        Imaging Results              CT Abdomen Pelvis With IV Contrast (Final result)  Result time 11/17/23 12:27:10      Final result by Adrian Lynch MD (11/17/23 12:27:10)                   Narrative:    CMS MANDATED QUALITY DATA - CT RADIATION  436    All CT scans at this facility utilize dose modulation, iterative reconstruction, and/or weight based dosing when appropriate to reduce radiation dose to as low as reasonably achievable.    CLINICAL HISTORY:  48 years (1975) Male Pulmonary embolism (PE) suspected, high prob    TECHNIQUE:  CT CHEST ANGIOGRAPHY WITH IV CONTRAST, CT ABDOMEN PELVIS WITH IV CONTRAST.  Axial CT examination of the chest with attention to the pulmonary arteries was performed using contiguous axial images from the diaphragms to the lung apices following the intravenous administration of non-ionic contrast material.  Images were reviewed using lung, mediastinal, and bone windows. The study was performed with thin sections with  subsequent 3-D reformations, mid and reconstructions at multiple planes with images were stored in the PACS system. Subsequently axial CT of the abdomen and pelvis was obtained.    COMPARISON:  None available.    FINDINGS:  CTA PE evaluation: This is a low quality study for the evaluation of pulmonary embolism secondary to a combination of contrast bolus timing and motion artifact. The pulmonary arteries are normal in appearance without pulmonary emboli noted up to the central main arterial level, noting the limitations of CT technique for identifying small or isolated subsegmental emboli.    CT Chest:  Visualized neck: Within normal limits.  Lungs: Very mild central hilar groundglass attenuation opacity is present. In addition there is ill-defined nodular densities as outlined below:  -6 mm left apical pulmonary nodule (image 83)  -6 mm and adjacent 7 mm pulmonary nodule in the left lower lobe (image 169)  -11 mm soft tissue pulmonary nodule in the left lower lobe (image 205)  -9 mm and adjacent 8mm cavitary pulmonary nodule in the adjacent left lower lobe (image 211)  Airway: The trachea and central bronchial tree appear normal.  Pleura: There is no pleural effusion. There is no pneumothorax.  Cardiovascular: The heart is normal in size. There are no findings of right heart failure. The pulmonary artery is normal in size. There is no pericardial effusion. The thoracic aorta and great vessels are normal in course and caliber.  Mediastinum: Small lymph nodes are present, for example:  -15 x 10 mm AP window node (image 134)  -21 x 12 mm right precarinal node (image 1:15)  -17 x 10 mm subcarinal node (image 136)  Soft tissues: The peripheral soft tissues appear normal.  Musculoskeletal: The visualized osseous structures appear normal.  There are no suspicious osseous lesions.  Esophagus: The esophagus appears grossly normal.    CT Abdomen:  Liver: Relative diffuse low-attenuation liver consistent with hepatic  steatosis. The liver is enlarged measuring 19.7 cm (sagittal right lobe).  Gallbladder: The gallbladder is surgically absent.  Biliary Tree: No intra or extrahepatic ductal dilation.  Spleen: Within normal limits.  Pancreas: There is a moderate-sized periampullary duodenal diverticulum. No ductal dilation, differential enhancement or mass is identified.  Adrenal Glands: The adrenal glands appear within normal limits.  Kidneys: No hydronephrosis/hydroureter or evidence of obstructive uropathy. There is a 4 mm nonobstructing stone in the inferior pole the left kidney and 2 mm nonobstructing stone in the inferior pole the right kidney (image 80 and 83 respectively). There mild faint striations in the renal nephrograms.  Vasculature: The aorta is normal in course and caliber.  Lymph nodes: No abdominal lymphadenopathy is seen.  Intraperitoneal structures: There is no ascites.  Bowel: Scattered sigmoid diverticula are present without focal diverticulitis. There is a rim calcified structure adjacent to the cecum (image 150) containing fat suggesting sequelae of old infection/trauma or a remote torsed epiploic appendage. The bowel is normal in course and caliber with no finding of obstruction, intra-abdominal free air or abscess. The appendix is normal (image 141)  Abdominal wall: The abdominal wall and musculature are normal.  Musculoskeletal: No acute osseous abnormality is identified.    CT Pelvis:  Bladder: The urinary bladder is within normal limits.  Reproductive Organs: The prostate and seminal vesicles are within normal limits.  Pelvic Lymph nodes: No pelvic lymphadenopathy or pelvic mass is identified.    IMPRESSION:  1. No evidence of pulmonary embolism to the central main arterial level. If there is continued clinical concern, consider further evaluation with lower extremity doppler or repeat imaging.  2. Scattered ill-defined nodular densities in the lungs, most noticeably in the left lower lobe, some of which  show central cavitation suspicious for septic emboli and/or necrosis. Consider correlation with the symptoms, history and possible echocardiogram.  3. Faint striations in the renal nephrograms, possibly secondary to phase of contrast or mild pyelonephritis, consider correlation with urinalysis.  4. Small bilateral nonobstructing renal stones.  5. Hepatomegaly background parenchyma findings a steatosis.  6. Prior cholecystectomy with no intra-/extrahepatic biliary ductal dilation.                    .    Electronically signed by:  Adrian Lynch MD  11/17/2023 12:27 PM [facility] Workstation: [id number]                                     CTA Chest Non-Coronary (PE Studies) (Final result)  Result time 11/17/23 12:27:10      Final result by Adrian Lynch MD (11/17/23 12:27:10)                   Narrative:    CMS MANDATED QUALITY DATA - CT RADIATION  436    All CT scans at this facility utilize dose modulation, iterative reconstruction, and/or weight based dosing when appropriate to reduce radiation dose to as low as reasonably achievable.    CLINICAL HISTORY:  48 years (1975) Male Pulmonary embolism (PE) suspected, high prob    TECHNIQUE:  CT CHEST ANGIOGRAPHY WITH IV CONTRAST, CT ABDOMEN PELVIS WITH IV CONTRAST.  Axial CT examination of the chest with attention to the pulmonary arteries was performed using contiguous axial images from the diaphragms to the lung apices following the intravenous administration of non-ionic contrast material.  Images were reviewed using lung, mediastinal, and bone windows. The study was performed with thin sections with subsequent 3-D reformations, mid and reconstructions at multiple planes with images were stored in the PACS system. Subsequently axial CT of the abdomen and pelvis was obtained.    COMPARISON:  None available.    FINDINGS:  CTA PE evaluation: This is a low quality study for the evaluation of pulmonary embolism secondary to a combination of contrast bolus timing and  motion artifact. The pulmonary arteries are normal in appearance without pulmonary emboli noted up to the central main arterial level, noting the limitations of CT technique for identifying small or isolated subsegmental emboli.    CT Chest:  Visualized neck: Within normal limits.  Lungs: Very mild central hilar groundglass attenuation opacity is present. In addition there is ill-defined nodular densities as outlined below:  -6 mm left apical pulmonary nodule (image 83)  -6 mm and adjacent 7 mm pulmonary nodule in the left lower lobe (image 169)  -11 mm soft tissue pulmonary nodule in the left lower lobe (image 205)  -9 mm and adjacent 8mm cavitary pulmonary nodule in the adjacent left lower lobe (image 211)  Airway: The trachea and central bronchial tree appear normal.  Pleura: There is no pleural effusion. There is no pneumothorax.  Cardiovascular: The heart is normal in size. There are no findings of right heart failure. The pulmonary artery is normal in size. There is no pericardial effusion. The thoracic aorta and great vessels are normal in course and caliber.  Mediastinum: Small lymph nodes are present, for example:  -15 x 10 mm AP window node (image 134)  -21 x 12 mm right precarinal node (image 1:15)  -17 x 10 mm subcarinal node (image 136)  Soft tissues: The peripheral soft tissues appear normal.  Musculoskeletal: The visualized osseous structures appear normal.  There are no suspicious osseous lesions.  Esophagus: The esophagus appears grossly normal.    CT Abdomen:  Liver: Relative diffuse low-attenuation liver consistent with hepatic steatosis. The liver is enlarged measuring 19.7 cm (sagittal right lobe).  Gallbladder: The gallbladder is surgically absent.  Biliary Tree: No intra or extrahepatic ductal dilation.  Spleen: Within normal limits.  Pancreas: There is a moderate-sized periampullary duodenal diverticulum. No ductal dilation, differential enhancement or mass is identified.  Adrenal Glands: The  adrenal glands appear within normal limits.  Kidneys: No hydronephrosis/hydroureter or evidence of obstructive uropathy. There is a 4 mm nonobstructing stone in the inferior pole the left kidney and 2 mm nonobstructing stone in the inferior pole the right kidney (image 80 and 83 respectively). There mild faint striations in the renal nephrograms.  Vasculature: The aorta is normal in course and caliber.  Lymph nodes: No abdominal lymphadenopathy is seen.  Intraperitoneal structures: There is no ascites.  Bowel: Scattered sigmoid diverticula are present without focal diverticulitis. There is a rim calcified structure adjacent to the cecum (image 150) containing fat suggesting sequelae of old infection/trauma or a remote torsed epiploic appendage. The bowel is normal in course and caliber with no finding of obstruction, intra-abdominal free air or abscess. The appendix is normal (image 141)  Abdominal wall: The abdominal wall and musculature are normal.  Musculoskeletal: No acute osseous abnormality is identified.    CT Pelvis:  Bladder: The urinary bladder is within normal limits.  Reproductive Organs: The prostate and seminal vesicles are within normal limits.  Pelvic Lymph nodes: No pelvic lymphadenopathy or pelvic mass is identified.    IMPRESSION:  1. No evidence of pulmonary embolism to the central main arterial level. If there is continued clinical concern, consider further evaluation with lower extremity doppler or repeat imaging.  2. Scattered ill-defined nodular densities in the lungs, most noticeably in the left lower lobe, some of which show central cavitation suspicious for septic emboli and/or necrosis. Consider correlation with the symptoms, history and possible echocardiogram.  3. Faint striations in the renal nephrograms, possibly secondary to phase of contrast or mild pyelonephritis, consider correlation with urinalysis.  4. Small bilateral nonobstructing renal stones.  5. Hepatomegaly background  parenchyma findings a steatosis.  6. Prior cholecystectomy with no intra-/extrahepatic biliary ductal dilation.                    .    Electronically signed by:  Adrian Lynch MD  11/17/2023 12:27 PM C[facility] Workstation: [id number]                                     X-Ray Chest AP Portable (Final result)  Result time 11/17/23 08:47:16      Final result by Rickie Brown MD (11/17/23 08:47:16)                   Narrative:    XR CHEST 1 VIEW    CLINICAL HISTORY:  48 years Male Chest Pain    COMPARISON: April 20, 2023    FINDINGS: Cardiac silhouette size is stable compared to prior. No pulmonary edema, confluent airspace disease, pleural effusion, or pneumothorax. No acute osseous abnormality.    IMPRESSION:    No acute pulmonary pathology.    Electronically signed by:  Rickie Brown MD  11/17/2023 08:47 AM CST Workstation: [id number]                                     Lab Results   Component Value Date    WBC 12.56 11/21/2023    HGB 11.9 (L) 11/21/2023    HCT 36.2 (L) 11/21/2023     (H) 11/21/2023     11/21/2023     BMP  Lab Results   Component Value Date     11/21/2023    K 3.4 (L) 11/21/2023     11/21/2023    CO2 24 11/21/2023    BUN 10 11/21/2023    CREATININE 0.8 11/21/2023    CALCIUM 8.3 (L) 11/21/2023    ANIONGAP 9 11/21/2023     11/21/2023     11/20/2023    GLU 82 11/19/2023       Results for orders placed during the hospital encounter of 11/17/23    Echo    Interpretation Summary    Left Ventricle: The left ventricle is normal in size. Normal wall thickness. There is concentric remodeling. Normal wall motion. There is normal systolic function with a visually estimated ejection fraction of 55 - 60%. There is normal diastolic function.    Right Ventricle: Normal right ventricular cavity size. Wall thickness is normal. Right ventricle wall motion  is normal. Systolic function is normal.    IVC/SVC: Normal venous pressure at 3 mmHg.         Pre-op Assessment    I have  reviewed the Patient Summary Reports.     I have reviewed the Nursing Notes. I have reviewed the NPO Status.   I have reviewed the Medications.     Review of Systems  Anesthesia Hx:  No problems with previous Anesthesia             Denies Family Hx of Anesthesia complications.    Denies Personal Hx of Anesthesia complications.                    Social:  Alcohol Use, Smoker Admission tox screen positive for opiates and marijuana      Hematology/Oncology:    Oncology Normal    -- Anemia:                                  EENT/Dental:  EENT/Dental Normal           Cardiovascular:     Hypertension, well controlled    Dysrhythmias       hyperlipidemia   ECG has been reviewed. H/O cardiac radiofrequency ablation for supraventricular tachycardia.      Shortness of Breath                    Hypertension         Pulmonary:  Pneumonia (bilateral)    Shortness of breath   Denies Sleep Apnea. Possible Septic embolism to the lungs.                     Renal/:  Chronic Renal Disease renal calculi       Kidney Function/Disease             Hepatic/GI:   PUD,     Ulcerative colitis.  Ozempic Therapy, last 11/11/2023.  Patient denies any nausea or vomiting today or yesterday.    Peptic Ulcer Disease   Bowel Conditions:  Inflammatory Bowel Disease, Ulcerative Colitis        Musculoskeletal:  Musculoskeletal Normal                Neurological:    Denies CVA.        Left-sided weakness per chart but patient denies                            Endocrine:  Diabetes, poorly controlled, type 2    Diabetes                    Morbid Obesity / BMI > 40  Psych:  Psychiatric History anxiety  Sleep Disorder.       Sleep Disorder.        Physical Exam  General: Cooperative, Alert and Oriented    Airway:  Mallampati: IV / III  Mouth Opening: Normal  TM Distance: > 6 cm  Tongue: Normal  Neck ROM: Normal ROM    Dental:  Intact    Chest/Lungs:  Clear to auscultation, Normal Respiratory Rate    Heart:  Rate: Normal  Rhythm: Regular Rhythm  Sounds:  Normal        Anesthesia Plan  Type of Anesthesia, risks & benefits discussed:    Anesthesia Type: Gen Natural Airway  Intra-op Monitoring Plan: Standard ASA Monitors  Induction:  IV  ASA Score: 4  Anesthesia Plan Notes:   GNA  Procedural oxygen mask  TB precautions    Ready For Surgery From Anesthesia Perspective.     .

## 2023-11-21 NOTE — PROGRESS NOTES
[facility]  Progress Note    Patient Name: Jacoby Jean-Baptiste  MRN: [id number]  Patient Class: IP- Inpatient   Admission Date: 11/17/2023  Length of Stay: 4 days  Attending Physician: Мария Durham MD  Primary Care Provider: Hunter Aguilar III, MD        Subjective:     Principal Problem:Septic embolism        HPI:  Pt is a 48 year old male with a PMH of DM2, anemia, tobacco use, HLD, and anxiety. Pt presents to ED today after complaints of SOB, chest pain, nausea/vomiting, and abdominal pain x1 week. Pt states he has had progressively worsening symptoms and unable to find relief on own over the last week. Pt has history of colitis, CT of abdomen shows potential mild pyelonephritis. Pt does not have CVA tenderness on exam, creatinine 1.1, and UA not diagnostic. Lipase 8, negative flu/covid, and WBC 12.53. CTA of chest shows no evidence of PE but does have L lower lobe suspicion for septic emboli and/or necrosis. Pt has SOB and requires 2L O2 to keep >90%. Pt given nebulizer treatment in ED and has improvement with SOB. Pt on exam states he is feeling better and most symptoms have diminished including chest pain, SOB, nausea, and abdominal pain. Pt started on antibiotics in ED but pt refused due to hx of cdiff and GI giving him antibiotic free window to abide by. Discussed risk vs benefits with new onset sepsis. Cardiology consulted and echo pending. Pt states he stopped smoking approximately 1 week ago, denies alcohol use, and illicit drug use. Full code.     Overview/Hospital Course:  Patient was admitted with chest pain and CT chest showed  patchy nodular infiltrates, one of which is cavitary in the left lower lobe.  The patient is immunosuppressed from his Remicade injections for his ulcerative colitis.  He was started on empiric antibiotic therapy.  He was evaluated by Infectious Disease.  Cultures have been negative so far.  He had bronchoscopy by Pulmonary on 11/20.  Will have  KATJA by Cardiology on 11/21.    Interval History: seen after KATJA. Having coughing fits with dyspnea. Remains on room air. No CP. Rash is still itching    Review of Systems Complete ROS otherwise negative other than stated in HPI.   Objective:     Vital Signs (Most Recent):  Temp: 97.1 °F (36.2 °C) (11/21/23 1100)  Pulse: 96 (11/21/23 1100)  Resp: 18 (11/21/23 1100)  BP: (!) 141/76 (11/21/23 1100)  SpO2: (!) 92 % (11/21/23 1100) Vital Signs (24h Range):  Temp:  [96.2 °F (35.7 °C)-99 °F (37.2 °C)] 97.1 °F (36.2 °C)  Pulse:  [] 96  Resp:  [16-18] 18  SpO2:  [90 %-100 %] 92 %  BP: (132-155)/(63-80) 141/76     Weight: 132.9 kg (292 lb 15.9 oz)  Body mass index is 48.76 kg/m².    Intake/Output Summary (Last 24 hours) at 11/21/2023 1209  Last data filed at 11/20/2023 1556  Gross per 24 hour   Intake 240 ml   Output --   Net 240 ml         Physical Exam  Vitals: Reviewed  GENERAL:  Alert and oriented x 3  HEENT:  EOMI. Conjunctivae intact.   NECK:  Supple   LUNGS:  No respiratory distress. Coarse exp wheezing  CARDIAC:  RRR without murmur, rub or gallop  ABDOMEN:  Soft,  Nontender and nondistended, no rebound or guarding, bowel sounds present   EXTREMITIES:  Peripheral pulses are 2+. Hands and feet are warm. Good capillary refill in fingers (< 2 seconds). No clubbing, cyanosis or edema  SKIN:  diffuse maculopapular rash          Significant Labs: All pertinent labs within the past 24 hours have been reviewed.  BMP:   Recent Labs   Lab 11/21/23  0444         K 3.4*      CO2 24   BUN 10   CREATININE 0.8   CALCIUM 8.3*   MG 1.3*     CBC:   Recent Labs   Lab 11/20/23  0457 11/21/23  0445   WBC 12.86* 12.56   HGB 12.1* 11.9*   HCT 36.4* 36.2*    273     CMP:   Recent Labs   Lab 11/20/23  0457 11/21/23  0444    137   K 3.7 3.4*    104   CO2 26 24    102   BUN 14 10   CREATININE 1.1 0.8   CALCIUM 7.8* 8.3*   PROT 6.4 6.6   ALBUMIN 3.0* 3.1*   BILITOT 0.3 0.6   ALKPHOS 58 65   AST  "18 15   ALT 18 17   ANIONGAP 6* 9     Cardiac Markers: No results for input(s): "CKMB", "MYOGLOBIN", "BNP", "TROPISTAT" in the last 48 hours.    Significant Imaging: I have reviewed all pertinent imaging results/findings within the past 24 hours.    Assessment/Plan:        * Septic embolism of lung  CTA of chest shows septic emboli and/or necrosis   All cultures and infectious disease workup negative so far, multiple still pending including bronch cultures. KOH prep negative  Continue antibiotics per ID.  Bronchoscopy 11/20 per Pulmonary  KATJA today follow results  Add mucolytic  Remains on room air          Drug rash  Add triamcinolone cream    Chest pain  Resolved.  cont telemetry monitoring      Shortness of breath  Intermittent associated with cough  Nebulizer treatments   Continuous pulse oximetry         Hypomagnesemia  Trend electrolytes and replete PRN     Polysubstance abuse  Urine toxicology with opioids and marijuana        Anxiety  Continue home regimen      Type 2 diabetes mellitus without complication, without long-term current use of insulin  POCT glucose   Insulin sliding scale .  Controlled.  Refuses diabetic diet     Hyperlipidemia   Continue statin         Morbid obesity  Resistant to lifestyle modifications      Tobacco use  Pt states he quit smoking last Saturday   Denies nicotine patch     VTE Risk Mitigation (From admission, onward)           Ordered     enoxaparin injection 40 mg  Every 12 hours         11/18/23 1036     IP VTE HIGH RISK PATIENT  Once         11/17/23 1308     Place sequential compression device  Until discontinued         11/17/23 1308                    Discharge Planning   GERARDO: 11/24/2023     Code Status: Full Code   Is the patient medically ready for discharge?:     Reason for patient still in hospital (select all that apply): Consult recommendations  Discharge Plan A: Home                  Мария Durham MD  Department of Hospital Medicine   [facility]    "

## 2023-11-21 NOTE — TRANSFER OF CARE
"Anesthesia Transfer of Care Note    Patient: Jacoby Jean-Baptiste    Procedure(s) Performed: * No procedures listed *    Patient location: Other:    Anesthesia Type: general    Transport from OR: Transported from OR on room air with adequate spontaneous ventilation    Post pain: adequate analgesia    Post assessment: no apparent anesthetic complications and tolerated procedure well    Level of consciousness: awake, alert and oriented    Nausea/Vomiting: no nausea/vomiting    Complications: none    Transfer of care protocol was followed      Last vitals: Visit Vitals  BP (!) 141/76 (BP Location: Right forearm, Patient Position: Lying)   Pulse 96   Temp 36.2 °C (97.1 °F) (Axillary)   Resp 18   Ht 5' 5" (1.651 m)   Wt 132.9 kg (292 lb 15.9 oz)   SpO2 (!) 92%   BMI 48.76 kg/m²     "

## 2023-11-21 NOTE — PROGRESS NOTES
Pulmonary/Critical Care Consult      PATIENT NAME: Jacoby Jean-Baptiste  MRN: [id number]  TODAY'S DATE: 2023  8:01 AM  ADMIT DATE: 2023  AGE: 48 y.o. : 1975    CONSULT REQUESTED BY: Мария Durham MD    REASON FOR CONSULT:   Shortness of breath and pneumonia    HPI:  The patient is a 48-year-old male who began experiencing chest pain and shortness of breath Saturday a week ago.  He is expectorating green mucous.  His CT showed some patchy nodular infiltrates 1 of which is beginning to cavitating the bottom of the left lower lobe.  The patient is immunosuppressed from his Remicade injections for his ulcerative colitis.  The patient initially thought he had flu because others around him had the flu and he is not vaccinated.  However he ruled out for COVID and the flu and shortness of breath continued and so he came to the emergency room.     the patient states he is feeling worse today than yesterday.  He is distressed that we have not figured out what is causing his pneumonia.  He is coughing and producing mucus proximally 4 times a day.  He continues to saturate very well on room air.    REVIEW OF SYSTEMS  GENERAL: Feeling short of breath with exertion  EYES: Vision is good.  ENT: No sinusitis or pharyngitis.   HEART: No chest pain or palpitations.  LUNGS:  He is coughing and producing green mucous 4 times a day  GI: No Nausea, vomiting, constipation, diarrhea, or reflux.  : No dysuria, hesitancy, or nocturia.  Skin:  His rash is now pruritic  MUSCULOSKELETAL: No joint pain or myalgias.  NEURO: No headaches or neuropathy.  LYMPH: No edema or adenopathy.  PSYCH: No anxiety or depression.  ENDO: No weight change.    No change in the patient's Past Medical History, Past Surgical History, Social History or Family History since admission.    VITAL SIGNS (MOST RECENT)  Temp: 96.4 °F (35.8 °C) (23 1500)  Pulse: 64 (23 1542)  Resp: 16 (23 1542)  BP: 110/69 (23 1500)  SpO2: (!) 94 %  (11/21/23 7862)    INTAKE AND OUTPUT (LAST 24 HOURS):  Intake/Output Summary (Last 24 hours) at 11/21/2023 1617  Last data filed at 11/21/2023 1539  Gross per 24 hour   Intake 640 ml   Output --   Net 640 ml       WEIGHT  Wt Readings from Last 1 Encounters:   11/19/23 132.9 kg (292 lb 15.9 oz)       PHYSICAL EXAM  GENERAL:  Mid aged obese patient in no distress.  HEENT: Pupils equal and reactive. Extraocular movements intact. Nose intact. Pharynx moist.  NECK: Supple.   HEART: Regular rate and rhythm. No murmur or gallop auscultated.  LUNGS: Clear to auscultation and percussion. Lung excursion symmetrical. No change in fremitus. No adventitial noises.  ABDOMEN: Bowel sounds present.  Obese.  Non-tender, no masses palpated.  : Normal anatomy.  EXTREMITIES: Normal muscle tone and joint movement, no cyanosis or clubbing.   LYMPHATICS: No adenopathy palpated, no edema.  SKIN: Dry the patient's petechial rash has spread across his body.  It is down to his ankles and is wrist and everywhere in between it is now pruritic.  NEURO: Cranial nerves II-XII intact. Motor strength 5/5 bilaterally, upper and lower extremities.  PSYCH:  Hard to read        CBC LAST (LAST 24 HOURS)  Recent Labs   Lab 11/21/23  0445   WBC 12.56   RBC 3.57*   HGB 11.9*   HCT 36.2*   *   MCH 33.3*   MCHC 32.9   RDW 13.9      MPV 10.6   GRAN 67.0  8.4*   LYMPH 9.7*  1.2   MONO 14.0  1.8*   BASO 0.06   NRBC 0       CHEMISTRY LAST (LAST 24 HOURS)  Recent Labs   Lab 11/21/23  0444      K 3.4*      CO2 24   ANIONGAP 9   BUN 10   CREATININE 0.8      CALCIUM 8.3*   MG 1.3*   ALBUMIN 3.1*   PROT 6.6   ALKPHOS 65   ALT 17   AST 15   BILITOT 0.6         CARDIAC PROFILE (LAST 24 HOURS)  Recent Labs   Lab 11/17/23  0931 11/17/23  1126 11/20/23  0457   BNP 18  --   --    LDH  --   --  192   TROPONINIHS 7.2 7.2  --       1:160 homogeneous pattern  HIV negative  ANCA pending  Aspergillus antigen pending  Histoplasma antigen  urine pending  Blastomycosis antigen urine pending  QuantiFERON gold pending  ACE pending    LAST 7 DAYS MICROBIOLOGY   Microbiology Results (last 7 days)       Procedure Component Value Units Date/Time    Blood culture x two cultures. Draw prior to antibiotics. [[id number]] Collected: 11/17/23 1257    Order Status: Completed Specimen: Blood from Antecubital, Right Arm Updated: 11/21/23 1432     Blood Culture, Routine No Growth to date      No Growth to date      No Growth to date      No Growth to date      No Growth to date    Narrative:      Aerobic and anaerobic    Blood culture [[id number]] Collected: 11/18/23 1014    Order Status: Completed Specimen: Blood Updated: 11/21/23 1032     Blood Culture, Routine No Growth to date      No Growth to date      No Growth to date      No Growth to date    Culture, Respiratory with Gram Stain [[id number]] Collected: 11/20/23 1201    Order Status: Completed Specimen: Bronchial Wash, LLL Updated: 11/21/23 0740     Respiratory Culture No Growth     Gram Stain (Respiratory) <10 epithelial cells per low power field.     Gram Stain (Respiratory) Rare WBC's     Gram Stain (Respiratory) No organisms seen    Blood culture [[id number]] Collected: 11/19/23 0539    Order Status: Completed Specimen: Blood Updated: 11/21/23 0632     Blood Culture, Routine No Growth to date      No Growth to date      No Growth to date    AFB Culture & Smear [[id number]]     Order Status: Sent Specimen: Sputum, Induced     Respiratory Infection Panel (PCR), Nasopharyngeal [[id number]] Collected: 11/20/23 1829    Order Status: Completed Specimen: Nasopharyngeal Swab Updated: 11/20/23 2122     Respiratory Infection Panel Source NP swab     Adenovirus Not Detected     Coronavirus 229E, Common Cold Virus Not Detected     Coronavirus HKU1, Common Cold Virus Not Detected     Coronavirus NL63, Common Cold Virus Not Detected     Coronavirus OC43, Common Cold Virus Not Detected     Comment: Coronavirus strains  229E, HKU1, NL63, and OC43 can cause the common   cold   and are not associated with the respiratory disease outbreak caused   by  the COVID-19 (SARS-CoV-2 novel Coronavirus) strain.           SARS-CoV2 (COVID-19) Qualitative PCR Not Detected     Human Metapneumovirus Not Detected     Human Rhinovirus/Enterovirus Not Detected     Influenza A (subtypes H1, H1-2009,H3) Not Detected     Influenza B Not Detected     Parainfluenza Virus 1 Not Detected     Parainfluenza Virus 2 Not Detected     Parainfluenza Virus 3 Not Detected     Parainfluenza Virus 4 Not Detected     Respiratory Syncytial Virus Not Detected     Bordetella Parapertussis (LA4847) Not Detected     Bordetella pertussis (ptxP) Not Detected     Chlamydia pneumoniae Not Detected     Mycoplasma pneumoniae Not Detected     Comment: Respiratory Infection Panel testing performed by Multiplex PCR.       Narrative:      Respiratory Infection Panel source->NP Swab    MRSA Screen by PCR [[id number]] Collected: 11/20/23 1830    Order Status: Completed Specimen: Nasopharyngeal Swab from Nasal Updated: 11/20/23 2041     MRSA SCREEN BY PCR Negative    KOH prep [[id number]] Collected: 11/20/23 1201    Order Status: Completed Specimen: Bronchial Brush from Bronchial Wash, LLL Updated: 11/20/23 1529     KOH Prep No yeast or fungal elements seen    KOH prep [[id number]] Collected: 11/20/23 1201    Order Status: Completed Specimen: Bronchial Wash, LLL Updated: 11/20/23 1529     KOH Prep No yeast or fungal elements seen    AFB Culture & Smear [[id number]] Collected: 11/20/23 1201    Order Status: Sent Specimen: Respiratory from Bronchial Wash, LLL Updated: 11/20/23 1340    Fungus culture [[id number]] Collected: 11/20/23 1201    Order Status: Sent Specimen: Respiratory from Bronchial Wash, LLL Updated: 11/20/23 1340    AFB Culture & Smear [[id number]] Collected: 11/20/23 1201    Order Status: Sent Specimen: Respiratory from Bronchial Wash, LLL Updated: 11/20/23 1340    Fungus  culture [[id number]] Collected: 11/20/23 1201    Order Status: Sent Specimen: Respiratory from Bronchial Wash, LLL Updated: 11/20/23 1339    AFB Culture & Smear [[id number]]     Order Status: Sent Specimen: Sputum, Induced     Culture, Respiratory with Gram Stain [[id number]]     Order Status: Sent Specimen: Respiratory from Sputum, Induced     AFB Culture & Smear [[id number]]     Order Status: Sent Specimen: Sputum, Induced     Cryptococcal antigen [[id number]] Collected: 11/19/23 0539    Order Status: Sent Specimen: Blood, Venous Updated: 11/19/23 0616    Stool culture [[id number]]     Order Status: No result Specimen: Stool     Culture, Respiratory with Gram Stain [[id number]]     Order Status: Canceled Specimen: Respiratory     AFB Culture & Smear [[id number]]     Order Status: Canceled Specimen: Sputum     Blood culture x two cultures. Draw prior to antibiotics. [[id number]]     Order Status: Canceled Specimen: Blood             MOST RECENT IMAGING  X-Ray Chest AP Portable  Portable chest x-ray at 10:07 AM is compared to prior study dated 11/18/2023    Clinical history is pneumonia    Cardiomediastinal silhouette is normal in size. There is progression of the nodular alveolar opacities predominantly in the left lung compatible with multifocal pneumonia. There are no new infiltrates or pleural effusions. There are no acute osseous abnormalities.    IMPRESSION: Progression of the nodular alveolar opacities in the left lung corresponding to patient's known cavitary pneumonia    Electronically signed by:  Consuelo Shea MD  11/21/2023 10:19 AM [facility] Workstation: [id number]      CURRENT VISIT EKG  Results for orders placed or performed during the hospital encounter of 11/17/23   EKG 12-LEAD    Narrative    Test Reason : I48.91,I48.92,    Vent. Rate : 105 BPM     Atrial Rate : 105 BPM     P-R Int : 130 ms          QRS Dur : 074 ms      QT Int : 330 ms       P-R-T Axes : 057 019 031 degrees     QTc Int : 436  ms    Sinus tachycardia  Nonspecific T wave abnormality  Abnormal ECG  When compared with ECG of 17-NOV-2023 09:57,  Questionable change in QRS duration  Confirmed by Mynor RENTERIA, Harris ONEILL ([id number]) on 11/19/2023 3:43:52 PM    Referred By: SELVIN   SELF           Confirmed By:Harris Lowe MD       ECHOCARDIOGRAM RESULTS  Results for orders placed during the hospital encounter of 11/17/23    Echo    Interpretation Summary    Left Ventricle: The left ventricle is normal in size. Normal wall thickness. There is concentric remodeling. Normal wall motion. There is normal systolic function with a visually estimated ejection fraction of 55 - 60%. There is normal diastolic function.    Right Ventricle: Normal right ventricular cavity size. Wall thickness is normal. Right ventricle wall motion  is normal. Systolic function is normal.    IVC/SVC: Normal venous pressure at 3 mmHg.    The patient is on room air.    IMPRESSION AND PLAN  Bilateral pneumonia with a nodular cavitary appearance.  - chest x-ray is worse today  - some immunosuppression secondary to Remicade  - bronchoscopy results negative at this time  - KATJA done, no results as of yet  - a lot of the lab workup is still pending, will add an anti-glomerular basement membrane to this  Ulcerative colitis  Rash   - worsening  - which is likely developed secondary to antibiotics/antiviral/antifungal  - add Benadryl p.r.n.  Leukocytosis  Anemia  Hypomagnesemia  - replace and trend  Morbid obesity/mild hypoalbuminemia  Opiates and marijuana present on his drug screen   - both of which he denied  Probable obstructive sleep apnea  30 pack-year tobacco use  Abnormal urinalysis    Lisa Villarreal MD  Date of Service: 11/21/2023  8:01 AM

## 2023-11-21 NOTE — SUBJECTIVE & OBJECTIVE
Interval History: seen after KATJA. Having coughing fits with dyspnea. Remains on room air. No CP. Rash is still itching    Review of Systems Complete ROS otherwise negative other than stated in HPI.   Objective:     Vital Signs (Most Recent):  Temp: 97.1 °F (36.2 °C) (11/21/23 1100)  Pulse: 96 (11/21/23 1100)  Resp: 18 (11/21/23 1100)  BP: (!) 141/76 (11/21/23 1100)  SpO2: (!) 92 % (11/21/23 1100) Vital Signs (24h Range):  Temp:  [96.2 °F (35.7 °C)-99 °F (37.2 °C)] 97.1 °F (36.2 °C)  Pulse:  [] 96  Resp:  [16-18] 18  SpO2:  [90 %-100 %] 92 %  BP: (132-155)/(63-80) 141/76     Weight: 132.9 kg (292 lb 15.9 oz)  Body mass index is 48.76 kg/m².    Intake/Output Summary (Last 24 hours) at 11/21/2023 1209  Last data filed at 11/20/2023 1556  Gross per 24 hour   Intake 240 ml   Output --   Net 240 ml         Physical Exam  Vitals: Reviewed  GENERAL:  Alert and oriented x 3  HEENT:  EOMI. Conjunctivae intact.   NECK:  Supple   LUNGS:  No respiratory distress. Coarse exp wheezing  CARDIAC:  RRR without murmur, rub or gallop  ABDOMEN:  Soft,  Nontender and nondistended, no rebound or guarding, bowel sounds present   EXTREMITIES:  Peripheral pulses are 2+. Hands and feet are warm. Good capillary refill in fingers (< 2 seconds). No clubbing, cyanosis or edema  SKIN:  diffuse maculopapular rash          Significant Labs: All pertinent labs within the past 24 hours have been reviewed.  BMP:   Recent Labs   Lab 11/21/23  0444         K 3.4*      CO2 24   BUN 10   CREATININE 0.8   CALCIUM 8.3*   MG 1.3*     CBC:   Recent Labs   Lab 11/20/23 0457 11/21/23  0445   WBC 12.86* 12.56   HGB 12.1* 11.9*   HCT 36.4* 36.2*    273     CMP:   Recent Labs   Lab 11/20/23 0457 11/21/23  0444    137   K 3.7 3.4*    104   CO2 26 24    102   BUN 14 10   CREATININE 1.1 0.8   CALCIUM 7.8* 8.3*   PROT 6.4 6.6   ALBUMIN 3.0* 3.1*   BILITOT 0.3 0.6   ALKPHOS 58 65   AST 18 15   ALT 18 17   ANIONGAP 6*  "9     Cardiac Markers: No results for input(s): "CKMB", "MYOGLOBIN", "BNP", "TROPISTAT" in the last 48 hours.    Significant Imaging: I have reviewed all pertinent imaging results/findings within the past 24 hours.  "

## 2023-11-21 NOTE — PROGRESS NOTES
[facility]  Department of Infectious Disease  Progress Note        PATIENT NAME: Jacoby Jean-Baptiste  MRN: [id number]  TODAY'S DATE: 11/21/2023  ADMIT DATE: 11/17/2023    PRINCIPLE PROBLEM: Septic embolism    INTERVAL HISTORY      11/21:  Interim reviewed, patient seen examined at bedside.  He is awaiting KATJA today.  He continues to complain of productive cough, white/clear phlegm.  Noticed to have worsening petechial rash down to right flank, lower abdomen and arms.  Will stop Rocephin as well.  Hemodynamically stable, afebrile.  Labs reviewed, white count 12.5, no left shift, H&H 11.9/36.2, , platelet count 273.  Hypokalemia 3.4, stable kidney and liver function tests, will check CRP and procalcitonin tomorrow.   came back positive, awaiting titer.  RVP negative.  Discussed with Cardiology, preliminary KATJA report no evidence of vegetations.    11/20 Chaves:  Interim reviewed, discussed with Dr. Humphrey.  Patient seen examined at bedside, status post bronchoscopy this morning.  He developed petechial rash on his right flank, underneath his abdomen and bilateral groins.  He is tachypneic, with productive cough, on O2 by NC 3 L. he reports he feels okay, no change.  Slightly hypertensive, afebrile.  Labs reviewed, leukocytosis down to 12.8, no left shift, H&H 12.1/36.4, platelet count 221.  Creatinine 1.1, normal LFTs, CRP higher to 65 0.5, procalcitonin down to 12.2, still positive.  Vanc level this morning 26.3, supratherapeutic, pharmacy to adjust.  U tox positive for opiates and marijuana.  Micro reviewed, so far no growth, awaiting further infectious workup sent during the weekend.  KOH from bronchoscopy today no yeast identified.    Antibiotics (From admission, onward)      Start     Stop Route Frequency Ordered    11/21/23 1900  doxycycline (VIBRAMYCIN) 100 mg in dextrose 5% 100 mL IVPB (ready to mix)         -- IV Every 12 hours (non-standard times) 11/21/23 1132    11/21/23 1230   levoFLOXacin 750 mg/150 mL IVPB 750 mg         -- IV Every 24 hours (non-standard times) 11/21/23 1117    11/20/23 2100  vancomycin 125 mg/5 mL oral solution 125 mg         -- Oral 2 times daily 11/20/23 1724            ASSESSMENT and PLAN     Sepsis likely secondary to cavitary pneumonia/necrosis s/p BAL 11/20  -->265.5, high  Procal 17-->12  Blood cultures x2 sets no growth to date, pending final   MRSA nasal swab negative   RVP negative    Worsening petechial rash    H/o UC on Remicade, immunocompromised    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 twice a day for C diff prophylaxis while on antibiotics   CRP and procal ordered with AM labs  Follow-up extensive workup    D/w patient, Dr Villarreal    Thank you for this consult. Please send [employer] secure chat with any questions.    SUBJECTIVE        Review of Systems  Constitutional:  Denies fevers, chills, night sweats, loss of appetite.  HEENT: Denies visual changes, ear pain, sinus congestion, mouth pain or trouble swallowing, +poor oral hygiene   Neck: Denies neck pain or lumps.  Respiratory: +shortness of breath, mild productive cough, some wheezing, no hemoptysis  Cardiovascular:  Denies chest pain, palpitations or edema.  Gastrointestinal: Denies  nausea, vomiting, constipation or diarrhea.  Genitourinary:  Denies dysuria, frequency, urgency or hematuria   Musculoskeletal:  Denies joint pain or swelling, difficulty walking.    Skin: Worsening petechial rash  VAD:  PIV, L midline   Neurologic:  Denies motor or sensory loss, headaches or dizziness.    Psychiatric:  Denies changes in mood or  behavior.       OBJECTIVE     Temp:  [96.4 °F (35.8 °C)-99 °F (37.2 °C)] 96.4 °F (35.8 °C)  Pulse:  [] 64  Resp:  [12-18] 16  SpO2:  [90 %-100 %] 94 %  BP: ()/(41-83) 110/69    Physical Exam  General:  Obese male, sitting in the chair, on O2 by NC 3 L  Eyes: Eyes with no icterus or injection. Vision grossly normal  Ears: Hearing grossly normal.  Nose: Nares patent  Mouth: Moist mucous membranes, dentition is terrible: severe decay, rotten teeth. No ulcerations, erythema or exudates.  Neck: Supple, no tenderness to palpation.  Cardiovascular: Regular rate and rhythm, no murmurs, no edema.    Respiratory:  Bilateral rales  Gastrointestinal:  Soft with active bowel sounds, no tenderness to palpation, no distention.  Genitourinary:  No suprapubic tenderness.  Musculoskeletal:  Moves all extremities with good strength.    Skin:  Warm and dry,petechial rash on upper arms, abdomen dn lower right back  Neuro:   Oriented, conversant, follows commands.  Psych:  Calm    WOUNDS:   VAD: L Midline  ISOLATION: Airborne/rule out TB    CBC LAST 7 DAYS  Recent Labs   Lab 11/17/23  0931 11/18/23  0437 11/18/23  1647 11/19/23  0540 11/20/23  0457 11/21/23  0445   WBC 12.53 12.80* 14.80* 14.08* 12.86* 12.56   RBC 4.65 4.01* 4.10* 3.83* 3.59* 3.57*   HGB 16.1 13.4* 14.0 12.9* 12.1* 11.9*   HCT 46.4 40.5 41.7 38.0* 36.4* 36.2*   * 101* 102* 99* 101* 101*   MCH 34.6* 33.4* 34.1* 33.7* 33.7* 33.3*   MCHC 34.7 33.1 33.6 33.9 33.2 32.9   RDW 13.7 13.8 13.9 13.7 14.0 13.9   * 160 163 196 221 273   MPV 11.3 11.4 10.8 11.1 10.9 10.6   GRAN 82.0*  10.3* 78.0*  10.0* 77.6*  11.5* 77.6*  10.9* 72.6  9.3* 67.0  8.4*   LYMPH 8.9*  1.1 10.5*  1.4 8.9*  1.3 7.6*  1.1 9.0*  1.2 9.7*  1.2   MONO 8.5  1.1* 9.9  1.3* 10.3  1.5* 9.9  1.4* 10.3  1.3* 14.0  1.8*   BASO 0.03 0.04 0.02 0.02 0.04 0.06   NRBC 0 0 0 0 0 0         CHEMISTRY LAST 7 DAYS  Recent Labs   Lab 11/17/23  0931 11/18/23  0437 11/18/23  1643  "11/19/23  0540 11/20/23  0457 11/21/23  0444    137 136 135* 137 137   K 4.1 4.4 4.2 4.0 3.7 3.4*    107 105 103 105 104   CO2 26 23 26 21* 26 24   ANIONGAP 8 7* 5* 11 6* 9   BUN 11 10 12 11 14 10   CREATININE 1.1 0.9 1.0 0.9 1.1 0.8   * 115* 88 82 106 102   CALCIUM 9.4 8.4* 8.4* 8.2* 7.8* 8.3*   MG 1.3* 1.5* 1.5* 1.3* 1.3* 1.3*   ALBUMIN 4.4 3.5  --  3.5 3.0* 3.1*   PROT 8.4 6.8  --  7.2 6.4 6.6   ALKPHOS 70 57  --  55 58 65   ALT 27 19  --  20 18 17   AST 25 18  --  31 18 15   BILITOT 0.6 0.5  --  0.6 0.3 0.6         Estimated Creatinine Clearance: 142.3 mL/min (based on SCr of 0.8 mg/dL).    INFLAMMATORY/PROCAL  LAST 7 DAYS  Recent Labs   Lab 11/17/23  1350 11/17/23  1352 11/20/23 0457   PROCAL 17.063*  --  12.207*   CRP  --  228.2* 265.5*       No results found for: "ESR"  CRP   Date Value Ref Range Status   11/20/2023 265.5 (H) 0.0 - 8.2 mg/L Final   11/17/2023 228.2 (H) 0.0 - 8.2 mg/L Final   07/25/2023 0.46 <0.76 mg/dL Final   04/06/2023 11.55 (H) <0.76 mg/dL Final   01/11/2023 20.88 (H) <0.76 mg/dL Final   01/06/2023 11.02 (H) <0.76 mg/dL Final       PRIOR  MICROBIOLOGY:      LAST 7 DAYS MICROBIOLOGY   Microbiology Results (last 7 days)       Procedure Component Value Units Date/Time    Blood culture x two cultures. Draw prior to antibiotics. [[id number]] Collected: 11/17/23 1257    Order Status: Completed Specimen: Blood from Antecubital, Right Arm Updated: 11/21/23 1432     Blood Culture, Routine No Growth to date      No Growth to date      No Growth to date      No Growth to date      No Growth to date    Narrative:      Aerobic and anaerobic    Blood culture [[id number]] Collected: 11/18/23 1014    Order Status: Completed Specimen: Blood Updated: 11/21/23 1032     Blood Culture, Routine No Growth to date      No Growth to date      No Growth to date      No Growth to date    Culture, Respiratory with Gram Stain [[id number]] Collected: 11/20/23 1201    Order Status: Completed " Specimen: Bronchial Wash, LLL Updated: 11/21/23 0740     Respiratory Culture No Growth     Gram Stain (Respiratory) <10 epithelial cells per low power field.     Gram Stain (Respiratory) Rare WBC's     Gram Stain (Respiratory) No organisms seen    Blood culture [[id number]] Collected: 11/19/23 0539    Order Status: Completed Specimen: Blood Updated: 11/21/23 0632     Blood Culture, Routine No Growth to date      No Growth to date      No Growth to date    AFB Culture & Smear [[id number]]     Order Status: Sent Specimen: Sputum, Induced     Respiratory Infection Panel (PCR), Nasopharyngeal [[id number]] Collected: 11/20/23 1829    Order Status: Completed Specimen: Nasopharyngeal Swab Updated: 11/20/23 2122     Respiratory Infection Panel Source NP swab     Adenovirus Not Detected     Coronavirus 229E, Common Cold Virus Not Detected     Coronavirus HKU1, Common Cold Virus Not Detected     Coronavirus NL63, Common Cold Virus Not Detected     Coronavirus OC43, Common Cold Virus Not Detected     Comment: Coronavirus strains 229E, HKU1, NL63, and OC43 can cause the common   cold   and are not associated with the respiratory disease outbreak caused   by  the COVID-19 (SARS-CoV-2 novel Coronavirus) strain.           SARS-CoV2 (COVID-19) Qualitative PCR Not Detected     Human Metapneumovirus Not Detected     Human Rhinovirus/Enterovirus Not Detected     Influenza A (subtypes H1, H1-2009,H3) Not Detected     Influenza B Not Detected     Parainfluenza Virus 1 Not Detected     Parainfluenza Virus 2 Not Detected     Parainfluenza Virus 3 Not Detected     Parainfluenza Virus 4 Not Detected     Respiratory Syncytial Virus Not Detected     Bordetella Parapertussis (CU0063) Not Detected     Bordetella pertussis (ptxP) Not Detected     Chlamydia pneumoniae Not Detected     Mycoplasma pneumoniae Not Detected     Comment: Respiratory Infection Panel testing performed by Multiplex PCR.       Narrative:      Respiratory Infection Panel  source->NP Swab    MRSA Screen by PCR [[id number]] Collected: 11/20/23 1830    Order Status: Completed Specimen: Nasopharyngeal Swab from Nasal Updated: 11/20/23 2041     MRSA SCREEN BY PCR Negative    KOH prep [[id number]] Collected: 11/20/23 1201    Order Status: Completed Specimen: Bronchial Brush from Bronchial Wash, LLL Updated: 11/20/23 1529     KOH Prep No yeast or fungal elements seen    KOH prep [[id number]] Collected: 11/20/23 1201    Order Status: Completed Specimen: Bronchial Wash, LLL Updated: 11/20/23 1529     KOH Prep No yeast or fungal elements seen    AFB Culture & Smear [[id number]] Collected: 11/20/23 1201    Order Status: Sent Specimen: Respiratory from Bronchial Wash, LLL Updated: 11/20/23 1340    Fungus culture [[id number]] Collected: 11/20/23 1201    Order Status: Sent Specimen: Respiratory from Bronchial Wash, LLL Updated: 11/20/23 1340    AFB Culture & Smear [[id number]] Collected: 11/20/23 1201    Order Status: Sent Specimen: Respiratory from Bronchial Wash, LLL Updated: 11/20/23 1340    Fungus culture [[id number]] Collected: 11/20/23 1201    Order Status: Sent Specimen: Respiratory from Bronchial Wash, LLL Updated: 11/20/23 1339    AFB Culture & Smear [[id number]]     Order Status: Sent Specimen: Sputum, Induced     Culture, Respiratory with Gram Stain [[id number]]     Order Status: Sent Specimen: Respiratory from Sputum, Induced     AFB Culture & Smear [[id number]]     Order Status: Sent Specimen: Sputum, Induced     Cryptococcal antigen [[id number]] Collected: 11/19/23 0539    Order Status: Sent Specimen: Blood, Venous Updated: 11/19/23 0616    Stool culture [[id number]]     Order Status: No result Specimen: Stool     Culture, Respiratory with Gram Stain [[id number]]     Order Status: Canceled Specimen: Respiratory     AFB Culture & Smear [[id number]]     Order Status: Canceled Specimen: Sputum     Blood culture x two cultures. Draw prior to antibiotics. [[id number]]     Order Status:  Canceled Specimen: Blood             SUSCEPTIBILITY  No results found for the last 90 days.      CURRENT/PREVIOUS VISIT EKG  Results for orders placed or performed during the hospital encounter of 11/17/23   EKG 12-LEAD    Collection Time: 11/19/23  7:14 AM    Narrative    Test Reason : I48.91,I48.92,    Vent. Rate : 105 BPM     Atrial Rate : 105 BPM     P-R Int : 130 ms          QRS Dur : 074 ms      QT Int : 330 ms       P-R-T Axes : 057 019 031 degrees     QTc Int : 436 ms    Sinus tachycardia  Nonspecific T wave abnormality  Abnormal ECG  When compared with ECG of 17-NOV-2023 09:57,  Questionable change in QRS duration  Confirmed by Mynor RENTERIA, Harris ONEILL ([id number]) on 11/19/2023 3:43:52 PM    Referred By: [email or web address]   SELF           Confirmed By:Harris Lowe MD       Significant Labs: All pertinent labs within the past 24 hours have been reviewed.     Significant Imaging: I have reviewed all relevant and available imaging results/findings within the past 24 hours.    CTA Head: Negative for intracranial aneurysm, vascular malformation, or arterial occlusion.      CT head  No CT evidence of acute intracranial pathology.  Small right mastoid effusion.     11/17/2023 CT chest CT abdomen   1. No evidence of pulmonary embolism to the central main arterial level. If there is continued clinical concern, consider further evaluation with lower extremity doppler or repeat imaging.   2. Scattered ill-defined nodular densities in the lungs, most noticeably in the left lower lobe, some of which show central cavitation suspicious for septic emboli and/or necrosis. Consider correlation with the symptoms, history and possible echocardiogram.   3. Faint striations in the renal nephrograms, possibly secondary to phase of contrast or mild pyelonephritis, consider correlation with urinalysis.   4. Small bilateral nonobstructing renal stones.   5. Hepatomegaly background parenchyma findings a steatosis.   6. Prior cholecystectomy with  no intra-/extrahepatic biliary ductal dilation.      Cardiology:  11/17/2023 ECHO     Left Ventricle: The left ventricle is normal in size. Normal wall thickness. There is concentric remodeling. Normal wall motion. There is normal systolic function with a visually estimated ejection fraction of 55 - 60%. There is normal diastolic function.    Right Ventricle: Normal right ventricular cavity size. Wall thickness is normal. Right ventricle wall motion  is normal. Systolic function is normal.    IVC/SVC: Normal venous pressure at 3 mmHg.       Ophelia Galindo MD  Date of Service: 11/21/2023

## 2023-11-21 NOTE — NURSING
Patient status post KATJA. Patient drowsy, but opens eyes to voice/touch. Patient is oriented x4 and follows all commands appropriately. Continuous monitoring at bedside. Will continue to monitor patient closely. VSS

## 2023-11-21 NOTE — CARE UPDATE
11/21/23 0050   Patient Assessment/Suction   Level of Consciousness (AVPU) alert   Respiratory Effort Normal;Unlabored   All Lung Fields Breath Sounds coarse;wheezes, expiratory   PRE-TX-O2   Device (Oxygen Therapy) room air   SpO2 100 %   $ Pulse Oximetry - Multiple Charge Pulse Oximetry - Multiple   Pulse 81   Resp 18   Aerosol Therapy   $ Aerosol Therapy Charges Aerosol Treatment   Daily Review of Necessity (SVN) completed   Respiratory Treatment Status (SVN) given   Treatment Route (SVN) mask   Patient Position (SVN) HOB elevated   Post Treatment Assessment (SVN) breath sounds unchanged   Signs of Intolerance (SVN) none   Breath Sounds Post-Respiratory Treatment   Throughout All Fields Post-Treatment All Fields   Throughout All Fields Post-Treatment aeration increased   Post-treatment Heart Rate (beats/min) 83   Post-treatment Resp Rate (breaths/min) 19   Education   $ Education Bronchodilator;15 min

## 2023-11-21 NOTE — CARE UPDATE
11/21/23 0719   Patient Assessment/Suction   Level of Consciousness (AVPU) alert   Respiratory Effort Normal   Expansion/Accessory Muscles/Retractions no use of accessory muscles   All Lung Fields Breath Sounds Anterior:;wheezes, expiratory   Rhythm/Pattern, Respiratory unlabored   Cough Frequency infrequent   Cough Type good   Secretions Amount small   Secretions Color green   Secretions Characteristics thick   PRE-TX-O2   Device (Oxygen Therapy) room air   SpO2 (!) 92 %   Pulse Oximetry Type Intermittent   $ Pulse Oximetry - Multiple Charge Pulse Oximetry - Multiple   Pulse 94   Resp 18   Aerosol Therapy   $ Aerosol Therapy Charges Aerosol Treatment   Daily Review of Necessity (SVN) completed   Respiratory Treatment Status (SVN) given   Treatment Route (SVN) mask   Patient Position (SVN) HOB elevated   Post Treatment Assessment (SVN) patient reports breathing improved   Signs of Intolerance (SVN) none   Breath Sounds Post-Respiratory Treatment   Throughout All Fields Post-Treatment All Fields   Throughout All Fields Post-Treatment aeration increased   Post-treatment Heart Rate (beats/min) 96   Post-treatment Resp Rate (breaths/min) 17   Education   $ Education Bronchodilator;15 min

## 2023-11-21 NOTE — PROCEDURES
Bronchoscopy    Indication:  Multinodular cavitating pneumonia  Medications: Per anesthesia   Specimens:  Bronchial lavage, transbronchial brushes  Complications: None    The patient was intubated because of his probable obstructive sleep apnea.  The bronchoscope was introduced through the endotracheal tube.  The trachea was midline. The greg was sharp. The right upper lobe, right middle lobe, and right lower lobe subdivided into the usual subsegments. There were no endobronchial or submucosal abnormalities noted. The left upper lobe and left lower lobe subdivided into the usual subsegments. There were no endobronchial or submucosal abnormalities noted.  There was some off-white mucus in the airways.  This was minimal.  There was no erythema or edema.  The scope was positioned into the right lower lobe medial subsegment and a lavage was taken.  120 cc were instilled and 25 were returned.  This was followed by 2 transbronchial brushes.  The specimens were sent for C and S, KOH and fungal culture, AFB and mycobacterial culture.  The patient tolerated the procedure well.

## 2023-11-21 NOTE — ANESTHESIA POSTPROCEDURE EVALUATION
Anesthesia Post Evaluation    Patient: Jacoby Jean-Baptiste    Procedure(s) Performed: * No procedures listed *    Final Anesthesia Type: general      Patient location during evaluation: med/surg floor  Patient participation: Yes- Able to Participate  Level of consciousness: awake and alert and oriented  Post-procedure vital signs: reviewed and stable  Pain management: adequate  Airway patency: patent    PONV status at discharge: No PONV  Anesthetic complications: no      Cardiovascular status: blood pressure returned to baseline  Respiratory status: unassisted, spontaneous ventilation and room air  Hydration status: euvolemic  Follow-up not needed.          Vitals Value Taken Time   /83 11/21/23 1250   Temp 36.2 °C (97.1 °F) 11/21/23 1100   Pulse 94 11/21/23 1250   Resp 16 11/21/23 1250   SpO2 94 % 11/21/23 1250         No case tracking events are documented in the log.      Pain/Kusum Score: Pain Rating Prior to Med Admin: 4 (11/20/2023  9:36 PM)  Kusum Score: 10 (11/21/2023 12:50 PM)

## 2023-11-22 LAB
ACE SERPL-CCNC: 23 U/L (ref 14–82)
ALBUMIN SERPL BCP-MCNC: 3.1 G/DL (ref 3.5–5.2)
ALP SERPL-CCNC: 70 U/L (ref 55–135)
ALT SERPL W/O P-5'-P-CCNC: 21 U/L (ref 10–44)
ANION GAP SERPL CALC-SCNC: 9 MMOL/L (ref 8–16)
AST SERPL-CCNC: 24 U/L (ref 10–40)
BACTERIA BLD CULT: NORMAL
BACTERIA SPEC AEROBE CULT: NO GROWTH
BASOPHILS # BLD AUTO: 0.07 K/UL (ref 0–0.2)
BASOPHILS NFR BLD: 0.6 % (ref 0–1.9)
BILIRUB SERPL-MCNC: 0.7 MG/DL (ref 0.1–1)
BUN SERPL-MCNC: 9 MG/DL (ref 6–20)
C-ANCA TITR SER IF: NORMAL TITER
CALCIUM SERPL-MCNC: 8.5 MG/DL (ref 8.7–10.5)
CHLORIDE SERPL-SCNC: 105 MMOL/L (ref 95–110)
CO2 SERPL-SCNC: 25 MMOL/L (ref 23–29)
CREAT SERPL-MCNC: 0.9 MG/DL (ref 0.5–1.4)
CRP SERPL-MCNC: 187.6 MG/L (ref 0–8.2)
DIFFERENTIAL METHOD: ABNORMAL
EOSINOPHIL # BLD AUTO: 1.3 K/UL (ref 0–0.5)
EOSINOPHIL NFR BLD: 9.9 % (ref 0–8)
ERYTHROCYTE [DISTWIDTH] IN BLOOD BY AUTOMATED COUNT: 13.8 % (ref 11.5–14.5)
EST. GFR  (NO RACE VARIABLE): >60 ML/MIN/1.73 M^2
GAMMA INTERFERON BACKGROUND BLD IA-ACNC: 0.02 IU/ML
GLUCOSE SERPL-MCNC: 103 MG/DL (ref 70–110)
GLUCOSE SERPL-MCNC: 104 MG/DL (ref 70–110)
GRAM STN SPEC: NORMAL
HCT VFR BLD AUTO: 34.4 % (ref 40–54)
HGB BLD-MCNC: 11.6 G/DL (ref 14–18)
IMM GRANULOCYTES # BLD AUTO: 0.22 K/UL (ref 0–0.04)
IMM GRANULOCYTES NFR BLD AUTO: 1.8 % (ref 0–0.5)
LABCORP MISC TEST CODE: NORMAL
LABCORP MISC TEST NAME: NORMAL
LABCORP MISCELLANEOUS TEST: NORMAL
LYMPHOCYTES # BLD AUTO: 1.6 K/UL (ref 1–4.8)
LYMPHOCYTES NFR BLD: 12.3 % (ref 18–48)
M TB IFN-G BLD-IMP: NEGATIVE
M TB IFN-G CD4+ T-CELLS BLD-ACNC: 0.05 IU/ML
M TBIFN-G CD4+ CD8+T-CELLS BLD-ACNC: 0.03 IU/ML
MAGNESIUM SERPL-MCNC: 1.6 MG/DL (ref 1.6–2.6)
MCH RBC QN AUTO: 34.1 PG (ref 27–31)
MCHC RBC AUTO-ENTMCNC: 33.7 G/DL (ref 32–36)
MCV RBC AUTO: 101 FL (ref 82–98)
MITOGEN IGNF BLD-ACNC: 9.76 IU/ML
MONOCYTES # BLD AUTO: 1.7 K/UL (ref 0.3–1)
MONOCYTES NFR BLD: 13.7 % (ref 4–15)
MYELOPEROXIDASE AB: <0.2 UNITS (ref 0–0.9)
NEUTROPHILS # BLD AUTO: 7.8 K/UL (ref 1.8–7.7)
NEUTROPHILS NFR BLD: 61.7 % (ref 38–73)
NRBC BLD-RTO: 0 /100 WBC
P-ANCA ATYPICAL TITR SER IF: NORMAL TITER
P-ANCA TITR SER IF: NORMAL TITER
PHOSPHATE SERPL-MCNC: 2.2 MG/DL (ref 2.7–4.5)
PLATELET # BLD AUTO: 300 K/UL (ref 150–450)
PMV BLD AUTO: 10.3 FL (ref 9.2–12.9)
POTASSIUM SERPL-SCNC: 3.9 MMOL/L (ref 3.5–5.1)
PROCALCITONIN SERPL IA-MCNC: 4.85 NG/ML (ref 0–0.5)
PROT SERPL-MCNC: 6.7 G/DL (ref 6–8.4)
PROTEINASE3 AB SER IA-ACNC: <0.2 UNITS (ref 0–0.9)
QUANTIFERON CRITERIA: NORMAL
RBC # BLD AUTO: 3.4 M/UL (ref 4.6–6.2)
SODIUM SERPL-SCNC: 139 MMOL/L (ref 136–145)
WBC # BLD AUTO: 12.57 K/UL (ref 3.9–12.7)

## 2023-11-22 PROCEDURE — 25000003 PHARM REV CODE 250: Performed by: HOSPITALIST

## 2023-11-22 PROCEDURE — 84145 PROCALCITONIN (PCT): CPT | Performed by: INTERNAL MEDICINE

## 2023-11-22 PROCEDURE — 99233 PR SUBSEQUENT HOSPITAL CARE,LEVL III: ICD-10-PCS | Mod: ,,, | Performed by: STUDENT IN AN ORGANIZED HEALTH CARE EDUCATION/TRAINING PROGRAM

## 2023-11-22 PROCEDURE — 99900031 HC PATIENT EDUCATION (STAT)

## 2023-11-22 PROCEDURE — 83735 ASSAY OF MAGNESIUM: CPT | Performed by: PHYSICAL THERAPY ASSISTANT

## 2023-11-22 PROCEDURE — 21400001 HC TELEMETRY ROOM

## 2023-11-22 PROCEDURE — 25000003 PHARM REV CODE 250: Performed by: PHYSICAL THERAPY ASSISTANT

## 2023-11-22 PROCEDURE — 86140 C-REACTIVE PROTEIN: CPT | Performed by: INTERNAL MEDICINE

## 2023-11-22 PROCEDURE — 25000003 PHARM REV CODE 250: Performed by: STUDENT IN AN ORGANIZED HEALTH CARE EDUCATION/TRAINING PROGRAM

## 2023-11-22 PROCEDURE — 99233 SBSQ HOSP IP/OBS HIGH 50: CPT | Mod: ,,, | Performed by: STUDENT IN AN ORGANIZED HEALTH CARE EDUCATION/TRAINING PROGRAM

## 2023-11-22 PROCEDURE — 63600175 PHARM REV CODE 636 W HCPCS: Performed by: HOSPITALIST

## 2023-11-22 PROCEDURE — 94640 AIRWAY INHALATION TREATMENT: CPT

## 2023-11-22 PROCEDURE — 84100 ASSAY OF PHOSPHORUS: CPT | Performed by: PHYSICAL THERAPY ASSISTANT

## 2023-11-22 PROCEDURE — 83516 IMMUNOASSAY NONANTIBODY: CPT | Performed by: INTERNAL MEDICINE

## 2023-11-22 PROCEDURE — 80053 COMPREHEN METABOLIC PANEL: CPT | Performed by: PHYSICAL THERAPY ASSISTANT

## 2023-11-22 PROCEDURE — 25000242 PHARM REV CODE 250 ALT 637 W/ HCPCS: Performed by: INTERNAL MEDICINE

## 2023-11-22 PROCEDURE — 36415 COLL VENOUS BLD VENIPUNCTURE: CPT | Performed by: INTERNAL MEDICINE

## 2023-11-22 PROCEDURE — 85025 COMPLETE CBC W/AUTO DIFF WBC: CPT | Performed by: PHYSICAL THERAPY ASSISTANT

## 2023-11-22 PROCEDURE — 94761 N-INVAS EAR/PLS OXIMETRY MLT: CPT

## 2023-11-22 PROCEDURE — 99900035 HC TECH TIME PER 15 MIN (STAT)

## 2023-11-22 PROCEDURE — 25000003 PHARM REV CODE 250: Performed by: INTERNAL MEDICINE

## 2023-11-22 PROCEDURE — 63600175 PHARM REV CODE 636 W HCPCS: Performed by: STUDENT IN AN ORGANIZED HEALTH CARE EDUCATION/TRAINING PROGRAM

## 2023-11-22 RX ADMIN — VARENICLINE TARTRATE 0.5 MG: 0.5 TABLET, FILM COATED ORAL at 09:11

## 2023-11-22 RX ADMIN — GUAIFENESIN AND CODEINE PHOSPHATE 5 ML: 100; 10 SOLUTION ORAL at 09:11

## 2023-11-22 RX ADMIN — ENOXAPARIN SODIUM 40 MG: 40 INJECTION SUBCUTANEOUS at 08:11

## 2023-11-22 RX ADMIN — LEVALBUTEROL HYDROCHLORIDE 1.25 MG: 1.25 SOLUTION RESPIRATORY (INHALATION) at 03:11

## 2023-11-22 RX ADMIN — PANTOPRAZOLE SODIUM 40 MG: 40 TABLET, DELAYED RELEASE ORAL at 09:11

## 2023-11-22 RX ADMIN — ATORVASTATIN CALCIUM 10 MG: 10 TABLET, FILM COATED ORAL at 09:11

## 2023-11-22 RX ADMIN — BUSPIRONE HYDROCHLORIDE 7.5 MG: 5 TABLET ORAL at 09:11

## 2023-11-22 RX ADMIN — POTASSIUM, SODIUM PHOSPHATES 280 MG-160 MG-250 MG ORAL POWDER PACKET 2 PACKET: POWDER IN PACKET at 08:11

## 2023-11-22 RX ADMIN — BUSPIRONE HYDROCHLORIDE 7.5 MG: 5 TABLET ORAL at 08:11

## 2023-11-22 RX ADMIN — LACTOBACILLUS ACIDOPHILUS / LACTOBACILLUS BULGARICUS 1 EACH: 100 MILLION CFU STRENGTH GRANULES at 08:11

## 2023-11-22 RX ADMIN — GUAIFENESIN AND CODEINE PHOSPHATE 5 ML: 100; 10 SOLUTION ORAL at 07:11

## 2023-11-22 RX ADMIN — PROMETHAZINE HYDROCHLORIDE 12.5 MG: 12.5 TABLET ORAL at 09:11

## 2023-11-22 RX ADMIN — ENOXAPARIN SODIUM 40 MG: 40 INJECTION SUBCUTANEOUS at 09:11

## 2023-11-22 RX ADMIN — HYDROCODONE BITARTRATE AND ACETAMINOPHEN 1 TABLET: 5; 325 TABLET ORAL at 09:11

## 2023-11-22 RX ADMIN — PANTOPRAZOLE SODIUM 40 MG: 40 TABLET, DELAYED RELEASE ORAL at 08:11

## 2023-11-22 RX ADMIN — VARENICLINE TARTRATE 0.5 MG: 0.5 TABLET, FILM COATED ORAL at 08:11

## 2023-11-22 RX ADMIN — POLYETHYLENE GLYCOL 3350 17 G: 17 POWDER, FOR SOLUTION ORAL at 08:11

## 2023-11-22 RX ADMIN — DIPHENHYDRAMINE HYDROCHLORIDE 25 MG: 25 CAPSULE ORAL at 02:11

## 2023-11-22 RX ADMIN — POTASSIUM, SODIUM PHOSPHATES 280 MG-160 MG-250 MG ORAL POWDER PACKET 2 PACKET: POWDER IN PACKET at 06:11

## 2023-11-22 RX ADMIN — HYDROCODONE BITARTRATE AND ACETAMINOPHEN 1 TABLET: 5; 325 TABLET ORAL at 02:11

## 2023-11-22 RX ADMIN — METOPROLOL SUCCINATE 50 MG: 50 TABLET, EXTENDED RELEASE ORAL at 08:11

## 2023-11-22 RX ADMIN — LEVALBUTEROL HYDROCHLORIDE 1.25 MG: 1.25 SOLUTION RESPIRATORY (INHALATION) at 07:11

## 2023-11-22 RX ADMIN — VANCOMYCIN HYDROCHLORIDE 125 MG: KIT at 08:11

## 2023-11-22 RX ADMIN — TRIAMCINOLONE ACETONIDE: 1 CREAM TOPICAL at 09:11

## 2023-11-22 RX ADMIN — SERTRALINE HYDROCHLORIDE 100 MG: 50 TABLET ORAL at 08:11

## 2023-11-22 RX ADMIN — GUAIFENESIN AND CODEINE PHOSPHATE 5 ML: 100; 10 SOLUTION ORAL at 02:11

## 2023-11-22 RX ADMIN — DOXYCYCLINE 100 MG: 100 INJECTION, POWDER, LYOPHILIZED, FOR SOLUTION INTRAVENOUS at 08:11

## 2023-11-22 RX ADMIN — LEVOFLOXACIN 750 MG: 5 INJECTION, SOLUTION INTRAVENOUS at 12:11

## 2023-11-22 RX ADMIN — BUSPIRONE HYDROCHLORIDE 7.5 MG: 5 TABLET ORAL at 06:11

## 2023-11-22 RX ADMIN — LEVALBUTEROL HYDROCHLORIDE 1.25 MG: 1.25 SOLUTION RESPIRATORY (INHALATION) at 12:11

## 2023-11-22 RX ADMIN — DOXYCYCLINE 100 MG: 100 INJECTION, POWDER, LYOPHILIZED, FOR SOLUTION INTRAVENOUS at 06:11

## 2023-11-22 RX ADMIN — Medication 800 MG: at 12:11

## 2023-11-22 RX ADMIN — CHLORHEXIDINE GLUCONATE 15 ML: 1.2 RINSE ORAL at 08:11

## 2023-11-22 RX ADMIN — Medication 800 MG: at 07:11

## 2023-11-22 RX ADMIN — VANCOMYCIN HYDROCHLORIDE 125 MG: KIT at 09:11

## 2023-11-22 RX ADMIN — DIPHENHYDRAMINE HYDROCHLORIDE 25 MG: 25 CAPSULE ORAL at 08:11

## 2023-11-22 RX ADMIN — POTASSIUM, SODIUM PHOSPHATES 280 MG-160 MG-250 MG ORAL POWDER PACKET 2 PACKET: POWDER IN PACKET at 12:11

## 2023-11-22 RX ADMIN — ZOLPIDEM TARTRATE 10 MG: 5 TABLET, COATED ORAL at 09:11

## 2023-11-22 RX ADMIN — DIPHENHYDRAMINE HYDROCHLORIDE 25 MG: 25 CAPSULE ORAL at 09:11

## 2023-11-22 RX ADMIN — HYDROCODONE BITARTRATE AND ACETAMINOPHEN 1 TABLET: 5; 325 TABLET ORAL at 07:11

## 2023-11-22 NOTE — PROGRESS NOTES
Pulmonary/Critical Care Consult      PATIENT NAME: Jacoby Jean-Baptiste  MRN: [id number]  TODAY'S DATE: 2023  8:01 AM  ADMIT DATE: 2023  AGE: 48 y.o. : 1975    CONSULT REQUESTED BY: Мария Durham MD    REASON FOR CONSULT:   Shortness of breath and pneumonia    HPI:  The patient is a 48-year-old male who began experiencing chest pain and shortness of breath Saturday a week ago.  He is expectorating green mucous.  His CT showed some patchy nodular infiltrates 1 of which is beginning to cavitating the bottom of the left lower lobe.  The patient is immunosuppressed from his Remicade injections for his ulcerative colitis.  The patient initially thought he had flu because others around him had the flu and he is not vaccinated.  However he ruled out for COVID and the flu and shortness of breath continued and so he came to the emergency room.     the patient states he is feeling worse today than yesterday.  He is distressed that we have not figured out what is causing his pneumonia.  He is coughing and producing mucus proximally 4 times a day.  He continues to saturate very well on room air.     reports that he is feelign about the same. He has dental issues- missing teeth and mulitple cavities- also taking sedating medications at night.      REVIEW OF SYSTEMS  GENERAL: Feeling short of breath with exertion  EYES: Vision is good.  ENT: No sinusitis or pharyngitis.   HEART: No chest pain or palpitations.  LUNGS:  He is coughing and producing green mucous 4 times a day  GI: No Nausea, vomiting, constipation, diarrhea, or reflux.  : No dysuria, hesitancy, or nocturia.  Skin:  His rash is now pruritic  MUSCULOSKELETAL: No joint pain or myalgias.  NEURO: No headaches or neuropathy.  LYMPH: No edema or adenopathy.  PSYCH: No anxiety or depression.  ENDO: No weight change.    No change in the patient's Past Medical History, Past Surgical History, Social History or Family History since admission.    VITAL  SIGNS (MOST RECENT)  Temp: 97.6 °F (36.4 °C) (11/22/23 0745)  Pulse: 93 (11/22/23 0745)  Resp: 16 (11/22/23 0745)  BP: 135/68 (11/22/23 0745)  SpO2: 96 % (11/22/23 0745)    INTAKE AND OUTPUT (LAST 24 HOURS):  Intake/Output Summary (Last 24 hours) at 11/22/2023 1019  Last data filed at 11/21/2023 1539  Gross per 24 hour   Intake 640 ml   Output --   Net 640 ml         WEIGHT  Wt Readings from Last 1 Encounters:   11/22/23 133.6 kg (294 lb 8.6 oz)       PHYSICAL EXAM  GENERAL:  Mid aged obese patient in no distress.  HEENT: Pupils equal and reactive. Extraocular movements intact. Nose intact. Pharynx moist.  NECK: Supple.   HEART: Regular rate and rhythm. No murmur or gallop auscultated.  LUNGS: Clear to auscultation and percussion. Lung excursion symmetrical. No change in fremitus. No adventitial noises.  ABDOMEN: Bowel sounds present.  Obese.  Non-tender, no masses palpated.  : Normal anatomy.  EXTREMITIES: Normal muscle tone and joint movement, no cyanosis or clubbing.   LYMPHATICS: No adenopathy palpated, no edema.  SKIN: Dry the patient's petechial rash has spread across his body.  It is down to his ankles and is wrist and everywhere in between it is now pruritic.  NEURO: Cranial nerves II-XII intact. Motor strength 5/5 bilaterally, upper and lower extremities.  PSYCH:  Hard to read        CBC LAST (LAST 24 HOURS)  Recent Labs   Lab 11/22/23 0443   WBC 12.57   RBC 3.40*   HGB 11.6*   HCT 34.4*   *   MCH 34.1*   MCHC 33.7   RDW 13.8      MPV 10.3   GRAN 61.7  7.8*   LYMPH 12.3*  1.6   MONO 13.7  1.7*   BASO 0.07   NRBC 0         CHEMISTRY LAST (LAST 24 HOURS)  Recent Labs   Lab 11/22/23 0443      K 3.9      CO2 25   ANIONGAP 9   BUN 9   CREATININE 0.9      CALCIUM 8.5*   MG 1.6   ALBUMIN 3.1*   PROT 6.7   ALKPHOS 70   ALT 21   AST 24   BILITOT 0.7           CARDIAC PROFILE (LAST 24 HOURS)  Recent Labs   Lab 11/17/23  0931 11/17/23  1126 11/20/23  0457   BNP 18  --   --     LDH  --   --  192   TROPONINIHS 7.2 7.2  --         1:160 homogeneous pattern  HIV negative  ANCA pending  Aspergillus antigen pending  Histoplasma antigen urine pending  Blastomycosis antigen urine pending  QuantiFERON gold pending  ACE pending    LAST 7 DAYS MICROBIOLOGY   Microbiology Results (last 7 days)       Procedure Component Value Units Date/Time    Culture, Respiratory with Gram Stain [[id number]] Collected: 11/20/23 1201    Order Status: Completed Specimen: Bronchial Wash, LLL Updated: 11/22/23 0924     Respiratory Culture No growth     Gram Stain (Respiratory) <10 epithelial cells per low power field.     Gram Stain (Respiratory) Rare WBC's     Gram Stain (Respiratory) No organisms seen    Blood culture [[id number]] Collected: 11/19/23 0539    Order Status: Completed Specimen: Blood Updated: 11/22/23 0632     Blood Culture, Routine No Growth to date      No Growth to date      No Growth to date      No Growth to date    AFB Culture & Smear [[id number]]     Order Status: Sent Specimen: Sputum, Induced     Blood culture x two cultures. Draw prior to antibiotics. [[id number]] Collected: 11/17/23 1257    Order Status: Completed Specimen: Blood from Antecubital, Right Arm Updated: 11/21/23 1432     Blood Culture, Routine No Growth to date      No Growth to date      No Growth to date      No Growth to date      No Growth to date    Narrative:      Aerobic and anaerobic    Blood culture [[id number]] Collected: 11/18/23 1014    Order Status: Completed Specimen: Blood Updated: 11/21/23 1032     Blood Culture, Routine No Growth to date      No Growth to date      No Growth to date      No Growth to date    AFB Culture & Smear [[id number]]     Order Status: Sent Specimen: Sputum, Induced     Respiratory Infection Panel (PCR), Nasopharyngeal [[id number]] Collected: 11/20/23 1829    Order Status: Completed Specimen: Nasopharyngeal Swab Updated: 11/20/23 2122     Respiratory Infection Panel Source NP swab      Adenovirus Not Detected     Coronavirus 229E, Common Cold Virus Not Detected     Coronavirus HKU1, Common Cold Virus Not Detected     Coronavirus NL63, Common Cold Virus Not Detected     Coronavirus OC43, Common Cold Virus Not Detected     Comment: Coronavirus strains 229E, HKU1, NL63, and OC43 can cause the common   cold   and are not associated with the respiratory disease outbreak caused   by  the COVID-19 (SARS-CoV-2 novel Coronavirus) strain.           SARS-CoV2 (COVID-19) Qualitative PCR Not Detected     Human Metapneumovirus Not Detected     Human Rhinovirus/Enterovirus Not Detected     Influenza A (subtypes H1, H1-2009,H3) Not Detected     Influenza B Not Detected     Parainfluenza Virus 1 Not Detected     Parainfluenza Virus 2 Not Detected     Parainfluenza Virus 3 Not Detected     Parainfluenza Virus 4 Not Detected     Respiratory Syncytial Virus Not Detected     Bordetella Parapertussis (QV0658) Not Detected     Bordetella pertussis (ptxP) Not Detected     Chlamydia pneumoniae Not Detected     Mycoplasma pneumoniae Not Detected     Comment: Respiratory Infection Panel testing performed by Multiplex PCR.       Narrative:      Respiratory Infection Panel source->NP Swab    MRSA Screen by PCR [[id number]] Collected: 11/20/23 1830    Order Status: Completed Specimen: Nasopharyngeal Swab from Nasal Updated: 11/20/23 2041     MRSA SCREEN BY PCR Negative    KOH prep [[id number]] Collected: 11/20/23 1201    Order Status: Completed Specimen: Bronchial Brush from Bronchial Wash, LLL Updated: 11/20/23 1529     KOH Prep No yeast or fungal elements seen    KOH prep [[id number]] Collected: 11/20/23 1201    Order Status: Completed Specimen: Bronchial Wash, LLL Updated: 11/20/23 1529     KOH Prep No yeast or fungal elements seen    AFB Culture & Smear [[id number]] Collected: 11/20/23 1201    Order Status: Sent Specimen: Respiratory from Bronchial Wash, LLL Updated: 11/20/23 1340    Fungus culture [[id number]] Collected:  11/20/23 1201    Order Status: Sent Specimen: Respiratory from Bronchial Wash, LLL Updated: 11/20/23 1340    AFB Culture & Smear [[id number]] Collected: 11/20/23 1201    Order Status: Sent Specimen: Respiratory from Bronchial Wash, LLL Updated: 11/20/23 1340    Fungus culture [[id number]] Collected: 11/20/23 1201    Order Status: Sent Specimen: Respiratory from Bronchial Wash, LLL Updated: 11/20/23 1339    AFB Culture & Smear [[id number]]     Order Status: Sent Specimen: Sputum, Induced     Culture, Respiratory with Gram Stain [[id number]]     Order Status: Sent Specimen: Respiratory from Sputum, Induced     AFB Culture & Smear [[id number]]     Order Status: Sent Specimen: Sputum, Induced     Cryptococcal antigen [[id number]] Collected: 11/19/23 0539    Order Status: Sent Specimen: Blood, Venous Updated: 11/19/23 0616    Stool culture [[id number]]     Order Status: No result Specimen: Stool     Culture, Respiratory with Gram Stain [[id number]]     Order Status: Canceled Specimen: Respiratory     AFB Culture & Smear [[id number]]     Order Status: Canceled Specimen: Sputum     Blood culture x two cultures. Draw prior to antibiotics. [[id number]]     Order Status: Canceled Specimen: Blood             MOST RECENT IMAGING  X-Ray Chest AP Portable  CLINICAL HISTORY:  48 years (1975) Male cavitating pneumonia Table formatting from the original note was not included.    TECHNIQUE:  Portable AP radiograph the chest. One view.    COMPARISON:  Radiograph from November 21, 2023.    FINDINGS:  Mild diffuse interstitial opacity in both lungs with a slightly more focal nodular appearance in the left hilum and lower lung zone. Costophrenic angles are seen without effusion. No pneumothorax is identified. The heart is top normal in size. Osseous structures show degenerative changes in the spine. The visualized upper abdomen is unremarkable.    IMPRESSION:  Unchanged radiograph of the chest when accounting for differences in  imaging technique.    .    Electronically signed by:  Adrian Lynch MD  11/22/2023 07:42 AM CST Workstation: [id number]      CURRENT VISIT EKG  Results for orders placed or performed during the hospital encounter of 11/17/23   EKG 12-LEAD    Narrative    Test Reason : I48.91,I48.92,    Vent. Rate : 105 BPM     Atrial Rate : 105 BPM     P-R Int : 130 ms          QRS Dur : 074 ms      QT Int : 330 ms       P-R-T Axes : 057 019 031 degrees     QTc Int : 436 ms    Sinus tachycardia  Nonspecific T wave abnormality  Abnormal ECG  When compared with ECG of 17-NOV-2023 09:57,  Questionable change in QRS duration  Confirmed by Mynor RENTERIA, Harris ONEILL ([id number]) on 11/19/2023 3:43:52 PM    Referred By: [email or web address]   SELF           Confirmed By:Harris Lowe MD       ECHOCARDIOGRAM RESULTS  Results for orders placed during the hospital encounter of 11/17/23    Echo    Interpretation Summary    Left Ventricle: The left ventricle is normal in size. Normal wall thickness. There is concentric remodeling. Normal wall motion. There is normal systolic function with a visually estimated ejection fraction of 55 - 60%. There is normal diastolic function.    Right Ventricle: Normal right ventricular cavity size. Wall thickness is normal. Right ventricle wall motion  is normal. Systolic function is normal.    IVC/SVC: Normal venous pressure at 3 mmHg.    The patient is on room air.              IMPRESSION AND PLAN      Mr Jean-Baptiste is a 48 year old man who presented with a bilateral necrotizing pneumonia. He has poor dental hygiene and takes sedating medications at night (ambien) which makes me suspect aspiration pneumonia as primary etiology. He is on room air and in not much distress. I dont see much evidence that the cavitary pneumonia is progressing at least today- it seems overall stable compared to yesterday and I dont see any consequences of prolonged pleural infection etc.      is positive but ANCA work up negative. Autoimmune  disease is certainly also on the differential.     Bilateral pneumonia with a nodular cavitary appearance.  - chest x-ray is stable today  - some immunosuppression secondary to Remicade  - bronchoscopy results negative at this time  - KATJA negative for vegiation  - a lot of the lab workup is still pending, will add an anti-glomerular basement membrane to this  Ulcerative colitis  Rash   - stable  - which is likely developed secondary to antibiotics/antiviral/antifungal  - add Benadryl p.r.n.  Leukocytosis  Anemia  Hypomagnesemia  - replace and trend  Morbid obesity/mild hypoalbuminemia  Opiates and marijuana present on his drug screen   - both of which he denied  Probable obstructive sleep apnea  30 pack-year tobacco use  Abnormal urinalysis    Neil Licona MD  Date of Service: 11/22/2023  8:01 AM

## 2023-11-22 NOTE — SUBJECTIVE & OBJECTIVE
"Interval History:  Complains of episodes of shortness a breath when he coughs.  This is improved with the cough medicine started yesterday.  No substernal chest pain.  Afebrile.    Review of Systems Complete ROS otherwise negative other than stated in HPI.   Objective:     Vital Signs (Most Recent):  Temp: 96.7 °F (35.9 °C) (11/22/23 1100)  Pulse: 84 (11/22/23 1100)  Resp: 16 (11/22/23 1100)  BP: (!) 140/95 (11/22/23 1100)  SpO2: (!) 94 % (11/22/23 1100) Vital Signs (24h Range):  Temp:  [96.4 °F (35.8 °C)-97.9 °F (36.6 °C)] 96.7 °F (35.9 °C)  Pulse:  [64-97] 84  Resp:  [15-17] 16  SpO2:  [92 %-96 %] 94 %  BP: (110-143)/(68-95) 140/95     Weight: 133.6 kg (294 lb 8.6 oz)  Body mass index is 49.01 kg/m².    Intake/Output Summary (Last 24 hours) at 11/22/2023 1236  Last data filed at 11/22/2023 1158  Gross per 24 hour   Intake 480 ml   Output --   Net 480 ml         Physical Exam  Vitals: Reviewed  GENERAL:  Alert and oriented x 3. obese  HEENT:  EOMI. Conjunctivae intact.   NECK:  Supple   LUNGS:  No respiratory distress.  Rales bilaterally  CARDIAC:  RRR without murmur, rub or gallop  ABDOMEN:  Soft,  Nontender and nondistended, no rebound or guarding, bowel sounds present   EXTREMITIES:  Peripheral pulses are 2+. Hands and feet are warm. Good capillary refill in fingers (< 2 seconds). No clubbing, cyanosis or edema  SKIN:  diffuse maculopapular rash        Significant Labs: All pertinent labs within the past 24 hours have been reviewed.  Blood Culture: No results for input(s): "LABBLOO" in the last 48 hours.  BMP:   Recent Labs   Lab 11/22/23 0443         K 3.9      CO2 25   BUN 9   CREATININE 0.9   CALCIUM 8.5*   MG 1.6     CBC:   Recent Labs   Lab 11/21/23 0445 11/22/23 0443   WBC 12.56 12.57   HGB 11.9* 11.6*   HCT 36.2* 34.4*    300     CMP:   Recent Labs   Lab 11/21/23 0444 11/22/23 0443    139   K 3.4* 3.9    105   CO2 24 25    103   BUN 10 9   CREATININE 0.8 " "0.9   CALCIUM 8.3* 8.5*   PROT 6.6 6.7   ALBUMIN 3.1* 3.1*   BILITOT 0.6 0.7   ALKPHOS 65 70   AST 15 24   ALT 17 21   ANIONGAP 9 9     Cardiac Markers: No results for input(s): "CKMB", "MYOGLOBIN", "BNP", "TROPISTAT" in the last 48 hours.  Respiratory Culture: No results for input(s): "GSRESP", "RESPIRATORYC" in the last 48 hours.    Significant Imaging: I have reviewed all pertinent imaging results/findings within the past 24 hours.  "

## 2023-11-22 NOTE — CARE UPDATE
11/22/23 0727   Patient Assessment/Suction   Level of Consciousness (AVPU) alert   Respiratory Effort Unlabored   All Lung Fields Breath Sounds wheezes, expiratory   PRE-TX-O2   Device (Oxygen Therapy) room air   SpO2 (!) 92 %   Pulse Oximetry Type Intermittent   $ Pulse Oximetry - Multiple Charge Pulse Oximetry - Multiple   Pulse 88   Resp 15   Aerosol Therapy   $ Aerosol Therapy Charges Aerosol Treatment   Daily Review of Necessity (SVN) completed   Respiratory Treatment Status (SVN) given   Treatment Route (SVN) mask   Patient Position (SVN) semi-Campbell's   Post Treatment Assessment (SVN) increased aeration   Signs of Intolerance (SVN) none   Breath Sounds Post-Respiratory Treatment   Throughout All Fields Post-Treatment aeration increased   Post-treatment Heart Rate (beats/min) 86   Post-treatment Resp Rate (breaths/min) 15   Education   $ Education Bronchodilator;15 min   Respiratory Evaluation   $ Care Plan Tech Time 15 min

## 2023-11-22 NOTE — PLAN OF CARE
Pt not medically clear to discharge waiting for ID to clear.   11/18/23 1722   Discharge Reassessment   Assessment Type Discharge Planning Reassessment   Discharge Plan discussed with: Patient   Discharge Plan A Home   Discharge Plan B Home   Transition of Care Barriers None   Post-Acute Status   Discharge Delays None known at this time

## 2023-11-22 NOTE — PROGRESS NOTES
[facility]  Department of Infectious Disease  Progress Note        PATIENT NAME: Jacoby Jean-Baptiste  MRN: [id number]  TODAY'S DATE: 11/22/2023  ADMIT DATE: 11/17/2023    PRINCIPLE PROBLEM: Septic embolism    INTERVAL HISTORY      11/22: Interim reviewed, patient seen and examined at bedside, he is breathing comfortable room air, rash has not progressed, he is scratching his abdomen.  Hypertensive, afebrile. He reports he feels the same, no change, aware that he is no longer requiring oxygen, currently on room air.  Labs reviewed, stable white count, no left shift, H&H 11.6/34.4, , platelet count 300.  Stable kidney and liver function tests, CRP down to 187.6, procalcitonin continues to fall 4.8.  ANCAs came back positive at 1:160, homogeneous pattern, cannot exclude SLE. TB gold negative. KATJA negative for endocarditis.     11/21:  Interim reviewed, patient seen examined at bedside.  He is awaiting KATJA today.  He continues to complain of productive cough, white/clear phlegm.  Noticed to have worsening petechial rash down to right flank, lower abdomen and arms.  Will stop Rocephin as well.  Hemodynamically stable, afebrile.  Labs reviewed, white count 12.5, no left shift, H&H 11.9/36.2, , platelet count 273.  Hypokalemia 3.4, stable kidney and liver function tests, will check CRP and procalcitonin tomorrow.   came back positive, awaiting titer.  RVP negative.  Discussed with Cardiology, preliminary KATJA report no evidence of vegetations.    11/20 Anastacio:  Interim reviewed, discussed with Dr. Humphrey.  Patient seen examined at bedside, status post bronchoscopy this morning.  He developed petechial rash on his right flank, underneath his abdomen and bilateral groins.  He is tachypneic, with productive cough, on O2 by NC 3 L. he reports he feels okay, no change.  Slightly hypertensive, afebrile.  Labs reviewed, leukocytosis down to 12.8, no left shift, H&H 12.1/36.4, platelet count 221.   Creatinine 1.1, normal LFTs, CRP higher to 65 0.5, procalcitonin down to 12.2, still positive.  Vanc level this morning 26.3, supratherapeutic, pharmacy to adjust.  U tox positive for opiates and marijuana.  Micro reviewed, so far no growth, awaiting further infectious workup sent during the weekend.  KOH from bronchoscopy today no yeast identified.    Antibiotics (From admission, onward)      Start     Stop Route Frequency Ordered    11/21/23 1900  doxycycline (VIBRAMYCIN) 100 mg in dextrose 5% 100 mL IVPB (ready to mix)         -- IV Every 12 hours (non-standard times) 11/21/23 1132    11/21/23 1230  levoFLOXacin 750 mg/150 mL IVPB 750 mg         -- IV Every 24 hours (non-standard times) 11/21/23 1117    11/20/23 2100  vancomycin 125 mg/5 mL oral solution 125 mg         -- Oral 2 times daily 11/20/23 1724            ASSESSMENT and PLAN     Sepsis likely secondary to cavitary pneumonia/necrosis s/p BAL 11/20 rule out aspiration pneumonia   -->265.5-->187.6, high  Procal 17-->12-->4.853, trending down  Blood cultures x2 sets no growth to date, pending final   MRSA nasal swab negative   RVP negative    Worsening petechial rash, cannot exclude SLE    H/o UC on Remicade, immunocompromised   TB gold negative   PPD negative    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 twice a day for C diff prophylaxis while on antibiotics   Upon discharge, levofloxacin 750 mg p.o. daily and doxycycline 100 mg p.o. twice a day for 8 days more, end date 11/30/23  Oral vancomycin 125mg PO twice a day while on abx, end date 11/30/23  Needs Rheumatology follow-up outpatient for positive , rule out SLE  Follow up with PCP  Can dc airborne  precautions     D/w patient, Dr Durham, Dr Licona     Will sign off, call us back with any questions    SUBJECTIVE        Review of Systems  Constitutional:  Denies fevers, chills, night sweats, loss of appetite.  HEENT: Denies visual changes, ear pain, sinus congestion, mouth pain or trouble swallowing, +poor oral hygiene   Neck: Denies neck pain or lumps.  Respiratory: +shortness of breath, mild productive cough, some wheezing, no hemoptysis  Cardiovascular:  Denies chest pain, palpitations or edema.  Gastrointestinal: Denies  nausea, vomiting, constipation or diarrhea.  Genitourinary:  Denies dysuria, frequency, urgency or hematuria   Musculoskeletal:  Denies joint pain or swelling, difficulty walking.    Skin: Worsening petechial rash  VAD:  PIV, L midline   Neurologic:  Denies motor or sensory loss, headaches or dizziness.    Psychiatric:  Denies changes in mood or behavior.       OBJECTIVE     Temp:  [96.7 °F (35.9 °C)-97.9 °F (36.6 °C)] 96.7 °F (35.9 °C)  Pulse:  [81-97] 93  Resp:  [15-17] 16  SpO2:  [92 %-96 %] 92 %  BP: (113-143)/(68-95) 140/95    Physical Exam  General:  Obese male, sitting in the chair, on room air  Eyes: Eyes with no icterus or injection. Vision grossly normal  Ears: Hearing grossly normal.  Nose: Nares patent  Mouth: Moist mucous membranes, dentition is terrible: severe decay, rotten teeth. No ulcerations, erythema or exudates.  Neck: Supple, no tenderness to palpation.  Cardiovascular: Regular rate and rhythm, no murmurs, no edema.    Respiratory:  Bilateral rales  Gastrointestinal:  Soft with active bowel sounds, no tenderness to palpation, no distention.  Genitourinary:  No suprapubic tenderness.  Musculoskeletal:  Moves all extremities with good strength.    Skin:  Warm and dry, petechial rash on upper arms, abdomen dn lower right back, has not progressed, he is has marks form scratching on his badomen  Neuro:   Oriented, conversant, follows commands.  Psych:  Calm    WOUNDS:   VAD:  L Midline  ISOLATION: None    CBC LAST 7 DAYS  Recent Labs   Lab 11/17/23  0931 11/18/23 0437 11/18/23 1647 11/19/23  0540 11/20/23  0457 11/21/23 0445 11/22/23 0443   WBC 12.53 12.80* 14.80* 14.08* 12.86* 12.56 12.57   RBC 4.65 4.01* 4.10* 3.83* 3.59* 3.57* 3.40*   HGB 16.1 13.4* 14.0 12.9* 12.1* 11.9* 11.6*   HCT 46.4 40.5 41.7 38.0* 36.4* 36.2* 34.4*   * 101* 102* 99* 101* 101* 101*   MCH 34.6* 33.4* 34.1* 33.7* 33.7* 33.3* 34.1*   MCHC 34.7 33.1 33.6 33.9 33.2 32.9 33.7   RDW 13.7 13.8 13.9 13.7 14.0 13.9 13.8   * 160 163 196 221 273 300   MPV 11.3 11.4 10.8 11.1 10.9 10.6 10.3   GRAN 82.0*  10.3* 78.0*  10.0* 77.6*  11.5* 77.6*  10.9* 72.6  9.3* 67.0  8.4* 61.7  7.8*   LYMPH 8.9*  1.1 10.5*  1.4 8.9*  1.3 7.6*  1.1 9.0*  1.2 9.7*  1.2 12.3*  1.6   MONO 8.5  1.1* 9.9  1.3* 10.3  1.5* 9.9  1.4* 10.3  1.3* 14.0  1.8* 13.7  1.7*   BASO 0.03 0.04 0.02 0.02 0.04 0.06 0.07   NRBC 0 0 0 0 0 0 0         CHEMISTRY LAST 7 DAYS  Recent Labs   Lab 11/17/23  0931 11/18/23 0437 11/18/23 1647 11/19/23  0540 11/20/23  0457 11/21/23  0444 11/22/23  0443    137 136 135* 137 137 139   K 4.1 4.4 4.2 4.0 3.7 3.4* 3.9    107 105 103 105 104 105   CO2 26 23 26 21* 26 24 25   ANIONGAP 8 7* 5* 11 6* 9 9   BUN 11 10 12 11 14 10 9   CREATININE 1.1 0.9 1.0 0.9 1.1 0.8 0.9   * 115* 88 82 106 102 103   CALCIUM 9.4 8.4* 8.4* 8.2* 7.8* 8.3* 8.5*   MG 1.3* 1.5* 1.5* 1.3* 1.3* 1.3* 1.6   ALBUMIN 4.4 3.5  --  3.5 3.0* 3.1* 3.1*   PROT 8.4 6.8  --  7.2 6.4 6.6 6.7   ALKPHOS 70 57  --  55 58 65 70   ALT 27 19  --  20 18 17 21   AST 25 18  --  31 18 15 24   BILITOT 0.6 0.5  --  0.6 0.3 0.6 0.7         Estimated Creatinine Clearance: 126.8 mL/min (based on SCr of 0.9 mg/dL).    INFLAMMATORY/PROCAL  LAST 7 DAYS  Recent Labs   Lab 11/17/23  1350 11/17/23  1352 11/20/23  0457 11/22/23  0443   PROCAL 17.063*  --  12.207* 4.853*   CRP  --  228.2* 265.5* 187.6*       No results found for:  ""ESR"  CRP   Date Value Ref Range Status   11/22/2023 187.6 (H) 0.0 - 8.2 mg/L Final   11/20/2023 265.5 (H) 0.0 - 8.2 mg/L Final   11/17/2023 228.2 (H) 0.0 - 8.2 mg/L Final   07/25/2023 0.46 <0.76 mg/dL Final   04/06/2023 11.55 (H) <0.76 mg/dL Final   01/11/2023 20.88 (H) <0.76 mg/dL Final   01/06/2023 11.02 (H) <0.76 mg/dL Final       PRIOR  MICROBIOLOGY:      LAST 7 DAYS MICROBIOLOGY   Microbiology Results (last 7 days)       Procedure Component Value Units Date/Time    AFB Culture & Smear [[id number]] Collected: 11/20/23 1201    Order Status: Completed Specimen: Bronchial Brush from Bronchial Wash, LLL Updated: 11/22/23 1511     AFB CULTURE STAIN No acid fast bacilli seen.     AFB CULTURE STAIN Testing performed by:     AFB CULTURE STAIN [facility]     AFB CULTURE STAIN [address] [facility]     [place] [address]     AFB CULTURE STAIN Dr.Brian Marge MD    AFB Culture & Smear [[id number]] Collected: 11/20/23 1201    Order Status: Completed Specimen: Bronchial Wash, LLL Updated: 11/22/23 1511     AFB CULTURE STAIN No acid fast bacilli seen.     AFB CULTURE STAIN Testing performed by:     AFB CULTURE STAIN [facility]     AFB CULTURE STAIN [address] [facility]     [place] [id number]     AFB CULTURE STAIN Dr.Brian Marge MD    Blood culture x two cultures. Draw prior to antibiotics. [[id number]] Collected: 11/17/23 1257    Order Status: Completed Specimen: Blood from Antecubital, Right Arm Updated: 11/22/23 1432     Blood Culture, Routine No growth after 5 days.    Narrative:      Aerobic and anaerobic    Blood culture [[id number]] Collected: 11/18/23 1014    Order Status: Completed Specimen: Blood Updated: 11/22/23 1032     Blood Culture, Routine No Growth to date      No Growth to date      No Growth to date      No Growth to date      No Growth to date    Culture, Respiratory with Gram Stain [[id number]] Collected: 11/20/23 1201    Order " Status: Completed Specimen: Bronchial Wash, LLL Updated: 11/22/23 0924     Respiratory Culture No growth     Gram Stain (Respiratory) <10 epithelial cells per low power field.     Gram Stain (Respiratory) Rare WBC's     Gram Stain (Respiratory) No organisms seen    Blood culture [[id number]] Collected: 11/19/23 0539    Order Status: Completed Specimen: Blood Updated: 11/22/23 0632     Blood Culture, Routine No Growth to date      No Growth to date      No Growth to date      No Growth to date    AFB Culture & Smear [[id number]]     Order Status: Sent Specimen: Sputum, Induced     AFB Culture & Smear [[id number]]     Order Status: Sent Specimen: Sputum, Induced     Respiratory Infection Panel (PCR), Nasopharyngeal [[id number]] Collected: 11/20/23 1829    Order Status: Completed Specimen: Nasopharyngeal Swab Updated: 11/20/23 2122     Respiratory Infection Panel Source NP swab     Adenovirus Not Detected     Coronavirus 229E, Common Cold Virus Not Detected     Coronavirus HKU1, Common Cold Virus Not Detected     Coronavirus NL63, Common Cold Virus Not Detected     Coronavirus OC43, Common Cold Virus Not Detected     Comment: Coronavirus strains 229E, HKU1, NL63, and OC43 can cause the common   cold   and are not associated with the respiratory disease outbreak caused   by  the COVID-19 (SARS-CoV-2 novel Coronavirus) strain.           SARS-CoV2 (COVID-19) Qualitative PCR Not Detected     Human Metapneumovirus Not Detected     Human Rhinovirus/Enterovirus Not Detected     Influenza A (subtypes H1, H1-2009,H3) Not Detected     Influenza B Not Detected     Parainfluenza Virus 1 Not Detected     Parainfluenza Virus 2 Not Detected     Parainfluenza Virus 3 Not Detected     Parainfluenza Virus 4 Not Detected     Respiratory Syncytial Virus Not Detected     Bordetella Parapertussis (FW1231) Not Detected     Bordetella pertussis (ptxP) Not Detected     Chlamydia pneumoniae Not Detected     Mycoplasma pneumoniae Not Detected      Comment: Respiratory Infection Panel testing performed by Multiplex PCR.       Narrative:      Respiratory Infection Panel source->NP Swab    MRSA Screen by PCR [[id number]] Collected: 11/20/23 1830    Order Status: Completed Specimen: Nasopharyngeal Swab from Nasal Updated: 11/20/23 2041     MRSA SCREEN BY PCR Negative    KOH prep [[id number]] Collected: 11/20/23 1201    Order Status: Completed Specimen: Bronchial Brush from Bronchial Wash, LLL Updated: 11/20/23 1529     KOH Prep No yeast or fungal elements seen    KOH prep [[id number]] Collected: 11/20/23 1201    Order Status: Completed Specimen: Bronchial Wash, LLL Updated: 11/20/23 1529     KOH Prep No yeast or fungal elements seen    Fungus culture [[id number]] Collected: 11/20/23 1201    Order Status: Sent Specimen: Respiratory from Bronchial Wash, LLL Updated: 11/20/23 1340    Fungus culture [[id number]] Collected: 11/20/23 1201    Order Status: Sent Specimen: Respiratory from Bronchial Wash, LLL Updated: 11/20/23 1339    AFB Culture & Smear [[id number]]     Order Status: Sent Specimen: Sputum, Induced     Culture, Respiratory with Gram Stain [[id number]]     Order Status: Sent Specimen: Respiratory from Sputum, Induced     AFB Culture & Smear [[id number]]     Order Status: Sent Specimen: Sputum, Induced     Cryptococcal antigen [[id number]] Collected: 11/19/23 0539    Order Status: Sent Specimen: Blood, Venous Updated: 11/19/23 0616    Stool culture [[id number]]     Order Status: No result Specimen: Stool     Culture, Respiratory with Gram Stain [[id number]]     Order Status: Canceled Specimen: Respiratory     AFB Culture & Smear [[id number]]     Order Status: Canceled Specimen: Sputum     Blood culture x two cultures. Draw prior to antibiotics. [[id number]]     Order Status: Canceled Specimen: Blood             SUSCEPTIBILITY  Susceptibility data from last 90 days.  Collected Specimen Info Organism   11/17/23 Blood from Antecubital, Right Arm No growth  after 5 days.         CURRENT/PREVIOUS VISIT EKG  Results for orders placed or performed during the hospital encounter of 11/17/23   EKG 12-LEAD    Collection Time: 11/19/23  7:14 AM    Narrative    Test Reason : I48.91,I48.92,    Vent. Rate : 105 BPM     Atrial Rate : 105 BPM     P-R Int : 130 ms          QRS Dur : 074 ms      QT Int : 330 ms       P-R-T Axes : 057 019 031 degrees     QTc Int : 436 ms    Sinus tachycardia  Nonspecific T wave abnormality  Abnormal ECG  When compared with ECG of 17-NOV-2023 09:57,  Questionable change in QRS duration  Confirmed by Mynor RENTERIA, Harris ONEILL ([id number]) on 11/19/2023 3:43:52 PM    Referred By: [email or web address]   SELF           Confirmed By:Harris Lowe MD       Significant Labs: All pertinent labs within the past 24 hours have been reviewed.     Significant Imaging: I have reviewed all relevant and available imaging results/findings within the past 24 hours.    CTA Head: Negative for intracranial aneurysm, vascular malformation, or arterial occlusion.      CT head  No CT evidence of acute intracranial pathology.  Small right mastoid effusion.     11/17/2023 CT chest CT abdomen   1. No evidence of pulmonary embolism to the central main arterial level. If there is continued clinical concern, consider further evaluation with lower extremity doppler or repeat imaging.   2. Scattered ill-defined nodular densities in the lungs, most noticeably in the left lower lobe, some of which show central cavitation suspicious for septic emboli and/or necrosis. Consider correlation with the symptoms, history and possible echocardiogram.   3. Faint striations in the renal nephrograms, possibly secondary to phase of contrast or mild pyelonephritis, consider correlation with urinalysis.   4. Small bilateral nonobstructing renal stones.   5. Hepatomegaly background parenchyma findings a steatosis.   6. Prior cholecystectomy with no intra-/extrahepatic biliary ductal dilation.       Cardiology:  11/17/2023 ECHO     Left Ventricle: The left ventricle is normal in size. Normal wall thickness. There is concentric remodeling. Normal wall motion. There is normal systolic function with a visually estimated ejection fraction of 55 - 60%. There is normal diastolic function.    Right Ventricle: Normal right ventricular cavity size. Wall thickness is normal. Right ventricle wall motion  is normal. Systolic function is normal.    IVC/SVC: Normal venous pressure at 3 mmHg.       Ophelia Galindo MD  Date of Service: 11/22/2023

## 2023-11-22 NOTE — PROGRESS NOTES
[facility]  Progress Note    Patient Name: Jacoby Jean-Baptiste  MRN: [id number]  Patient Class: IP- Inpatient   Admission Date: 11/17/2023  Length of Stay: 5 days  Attending Physician: Мария Durham MD  Primary Care Provider: Hunter Aguilar III, MD        Subjective:     Principal Problem:Septic embolism        HPI:  Pt is a 48 year old male with a PMH of DM2, anemia, tobacco use, HLD, and anxiety. Pt presents to ED today after complaints of SOB, chest pain, nausea/vomiting, and abdominal pain x1 week. Pt states he has had progressively worsening symptoms and unable to find relief on own over the last week. Pt has history of colitis, CT of abdomen shows potential mild pyelonephritis. Pt does not have CVA tenderness on exam, creatinine 1.1, and UA not diagnostic. Lipase 8, negative flu/covid, and WBC 12.53. CTA of chest shows no evidence of PE but does have L lower lobe suspicion for septic emboli and/or necrosis. Pt has SOB and requires 2L O2 to keep >90%. Pt given nebulizer treatment in ED and has improvement with SOB. Pt on exam states he is feeling better and most symptoms have diminished including chest pain, SOB, nausea, and abdominal pain. Pt started on antibiotics in ED but pt refused due to hx of cdiff and GI giving him antibiotic free window to abide by. Discussed risk vs benefits with new onset sepsis. Cardiology consulted and echo pending. Pt states he stopped smoking approximately 1 week ago, denies alcohol use, and illicit drug use. Full code.     Overview/Hospital Course:  Patient was admitted with chest pain and CT chest showed  patchy nodular infiltrates, one of which is cavitary in the left lower lobe.  The patient is immunosuppressed from his Remicade injections for his ulcerative colitis.  He was started on empiric antibiotic therapy.  He was evaluated by Infectious Disease.  Cultures have been negative so far.  He had bronchoscopy by Pulmonary on 11/20.  Will have  "KATJA by Cardiology on 11/21.    Interval History:  Complains of episodes of shortness a breath when he coughs.  This is improved with the cough medicine started yesterday.  No substernal chest pain.  Afebrile.    Review of Systems Complete ROS otherwise negative other than stated in HPI.   Objective:     Vital Signs (Most Recent):  Temp: 96.7 °F (35.9 °C) (11/22/23 1100)  Pulse: 84 (11/22/23 1100)  Resp: 16 (11/22/23 1100)  BP: (!) 140/95 (11/22/23 1100)  SpO2: (!) 94 % (11/22/23 1100) Vital Signs (24h Range):  Temp:  [96.4 °F (35.8 °C)-97.9 °F (36.6 °C)] 96.7 °F (35.9 °C)  Pulse:  [64-97] 84  Resp:  [15-17] 16  SpO2:  [92 %-96 %] 94 %  BP: (110-143)/(68-95) 140/95     Weight: 133.6 kg (294 lb 8.6 oz)  Body mass index is 49.01 kg/m².    Intake/Output Summary (Last 24 hours) at 11/22/2023 1236  Last data filed at 11/22/2023 1158  Gross per 24 hour   Intake 480 ml   Output --   Net 480 ml         Physical Exam  Vitals: Reviewed  GENERAL:  Alert and oriented x 3. obese  HEENT:  EOMI. Conjunctivae intact.   NECK:  Supple   LUNGS:  No respiratory distress.  Rales bilaterally  CARDIAC:  RRR without murmur, rub or gallop  ABDOMEN:  Soft,  Nontender and nondistended, no rebound or guarding, bowel sounds present   EXTREMITIES:  Peripheral pulses are 2+. Hands and feet are warm. Good capillary refill in fingers (< 2 seconds). No clubbing, cyanosis or edema  SKIN:  diffuse maculopapular rash        Significant Labs: All pertinent labs within the past 24 hours have been reviewed.  Blood Culture: No results for input(s): "LABBLOO" in the last 48 hours.  BMP:   Recent Labs   Lab 11/22/23  0443         K 3.9      CO2 25   BUN 9   CREATININE 0.9   CALCIUM 8.5*   MG 1.6     CBC:   Recent Labs   Lab 11/21/23 0445 11/22/23 0443   WBC 12.56 12.57   HGB 11.9* 11.6*   HCT 36.2* 34.4*    300     CMP:   Recent Labs   Lab 11/21/23 0444 11/22/23 0443    139   K 3.4* 3.9    105   CO2 24 25    " "103   BUN 10 9   CREATININE 0.8 0.9   CALCIUM 8.3* 8.5*   PROT 6.6 6.7   ALBUMIN 3.1* 3.1*   BILITOT 0.6 0.7   ALKPHOS 65 70   AST 15 24   ALT 17 21   ANIONGAP 9 9     Cardiac Markers: No results for input(s): "CKMB", "MYOGLOBIN", "BNP", "TROPISTAT" in the last 48 hours.  Respiratory Culture: No results for input(s): "GSRESP", "RESPIRATORYC" in the last 48 hours.    Significant Imaging: I have reviewed all pertinent imaging results/findings within the past 24 hours.    Assessment/Plan:      * Septic embolism of lung  CTA of chest on admission showed septic emboli and/or necrosis   Bronchoscopy done 11/20   KATJA done without vegetations  All cultures and extensive infectious disease workup negative so far, multiple still pending.    Most likely patient has been aspirating as he takes Ambien and was opioid positive on admission with poor oral hygiene  Continue antibiotics per ID.  He is still having dyspnea and cough.  Discussed case with Pulmonary and ID, plan for discharge when he improves with 14 days of antibiotics       Drug rash  Thought to be related to antibiotics which were adjusted.  Continue as-needed Benadryl and  triamcinolone cream     Chest pain  Resolved.  cont telemetry monitoring           Hypomagnesemia  Trend electrolytes and replete PRN     Polysubstance abuse  Urine toxicology with opioids and marijuana        Anxiety  Continue home regimen      Type 2 diabetes mellitus without complication, without long-term current use of insulin  POCT glucose   Insulin sliding scale .  Controlled.  Refuses diabetic diet     Hyperlipidemia   Continue statin         Morbid obesity  Resistant to lifestyle modifications      Tobacco use  Pt states he quit smoking last Saturday   Denies nicotine patch    VTE Risk Mitigation (From admission, onward)           Ordered     enoxaparin injection 40 mg  Every 12 hours         11/18/23 1036     IP VTE HIGH RISK PATIENT  Once         11/17/23 1308     Place sequential " compression device  Until discontinued         11/17/23 1308                    Discharge Planning   GERARDO: 11/24/2023     Code Status: Full Code   Is the patient medically ready for discharge?:     Reason for patient still in hospital (select all that apply): Treatment  Discharge Plan A: Home                  Мария Durham MD  Department of Hospital Medicine   [facility]

## 2023-11-23 VITALS
DIASTOLIC BLOOD PRESSURE: 67 MMHG | OXYGEN SATURATION: 92 % | WEIGHT: 292.75 LBS | SYSTOLIC BLOOD PRESSURE: 124 MMHG | TEMPERATURE: 98 F | HEIGHT: 65 IN | RESPIRATION RATE: 16 BRPM | BODY MASS INDEX: 48.78 KG/M2 | HEART RATE: 93 BPM

## 2023-11-23 LAB
ALBUMIN SERPL BCP-MCNC: 3.2 G/DL (ref 3.5–5.2)
ALP SERPL-CCNC: 68 U/L (ref 55–135)
ALT SERPL W/O P-5'-P-CCNC: 20 U/L (ref 10–44)
ANION GAP SERPL CALC-SCNC: 11 MMOL/L (ref 8–16)
AST SERPL-CCNC: 17 U/L (ref 10–40)
BACTERIA BLD CULT: NORMAL
BASOPHILS # BLD AUTO: 0.11 K/UL (ref 0–0.2)
BASOPHILS NFR BLD: 0.7 % (ref 0–1.9)
BILIRUB SERPL-MCNC: 0.6 MG/DL (ref 0.1–1)
BUN SERPL-MCNC: 7 MG/DL (ref 6–20)
CALCIUM SERPL-MCNC: 8.9 MG/DL (ref 8.7–10.5)
CHLORIDE SERPL-SCNC: 101 MMOL/L (ref 95–110)
CO2 SERPL-SCNC: 26 MMOL/L (ref 23–29)
CREAT SERPL-MCNC: 0.9 MG/DL (ref 0.5–1.4)
CRYPTOC AG SER QL IA.RAPID: NEGATIVE
DIFFERENTIAL METHOD: ABNORMAL
EOSINOPHIL # BLD AUTO: 1.5 K/UL (ref 0–0.5)
EOSINOPHIL NFR BLD: 9.7 % (ref 0–8)
ERYTHROCYTE [DISTWIDTH] IN BLOOD BY AUTOMATED COUNT: 13.7 % (ref 11.5–14.5)
EST. GFR  (NO RACE VARIABLE): >60 ML/MIN/1.73 M^2
GLUCOSE SERPL-MCNC: 102 MG/DL (ref 70–110)
GLUCOSE SERPL-MCNC: 111 MG/DL (ref 70–110)
HCT VFR BLD AUTO: 36.6 % (ref 40–54)
HGB BLD-MCNC: 12.3 G/DL (ref 14–18)
IMM GRANULOCYTES # BLD AUTO: 0.58 K/UL (ref 0–0.04)
IMM GRANULOCYTES NFR BLD AUTO: 3.8 % (ref 0–0.5)
LYMPHOCYTES # BLD AUTO: 1.9 K/UL (ref 1–4.8)
LYMPHOCYTES NFR BLD: 12.2 % (ref 18–48)
MAGNESIUM SERPL-MCNC: 1.4 MG/DL (ref 1.6–2.6)
MCH RBC QN AUTO: 33.9 PG (ref 27–31)
MCHC RBC AUTO-ENTMCNC: 33.6 G/DL (ref 32–36)
MCV RBC AUTO: 101 FL (ref 82–98)
MONOCYTES # BLD AUTO: 2.1 K/UL (ref 0.3–1)
MONOCYTES NFR BLD: 13.7 % (ref 4–15)
NEUTROPHILS # BLD AUTO: 9.3 K/UL (ref 1.8–7.7)
NEUTROPHILS NFR BLD: 59.9 % (ref 38–73)
NRBC BLD-RTO: 0 /100 WBC
PHOSPHATE SERPL-MCNC: 2.9 MG/DL (ref 2.7–4.5)
PLATELET # BLD AUTO: 357 K/UL (ref 150–450)
PMV BLD AUTO: 9.8 FL (ref 9.2–12.9)
POTASSIUM SERPL-SCNC: 3.9 MMOL/L (ref 3.5–5.1)
PROCALCITONIN SERPL IA-MCNC: 2.07 NG/ML (ref 0–0.5)
PROT SERPL-MCNC: 7.3 G/DL (ref 6–8.4)
RBC # BLD AUTO: 3.63 M/UL (ref 4.6–6.2)
SODIUM SERPL-SCNC: 138 MMOL/L (ref 136–145)
WBC # BLD AUTO: 15.42 K/UL (ref 3.9–12.7)

## 2023-11-23 PROCEDURE — 94640 AIRWAY INHALATION TREATMENT: CPT

## 2023-11-23 PROCEDURE — 84100 ASSAY OF PHOSPHORUS: CPT | Performed by: PHYSICAL THERAPY ASSISTANT

## 2023-11-23 PROCEDURE — 63600175 PHARM REV CODE 636 W HCPCS: Performed by: STUDENT IN AN ORGANIZED HEALTH CARE EDUCATION/TRAINING PROGRAM

## 2023-11-23 PROCEDURE — 25000003 PHARM REV CODE 250: Performed by: STUDENT IN AN ORGANIZED HEALTH CARE EDUCATION/TRAINING PROGRAM

## 2023-11-23 PROCEDURE — 99900035 HC TECH TIME PER 15 MIN (STAT)

## 2023-11-23 PROCEDURE — 25000003 PHARM REV CODE 250: Performed by: PHYSICAL THERAPY ASSISTANT

## 2023-11-23 PROCEDURE — 80053 COMPREHEN METABOLIC PANEL: CPT | Performed by: PHYSICAL THERAPY ASSISTANT

## 2023-11-23 PROCEDURE — 94799 UNLISTED PULMONARY SVC/PX: CPT

## 2023-11-23 PROCEDURE — 94761 N-INVAS EAR/PLS OXIMETRY MLT: CPT

## 2023-11-23 PROCEDURE — 36415 COLL VENOUS BLD VENIPUNCTURE: CPT | Performed by: INTERNAL MEDICINE

## 2023-11-23 PROCEDURE — 83735 ASSAY OF MAGNESIUM: CPT | Performed by: PHYSICAL THERAPY ASSISTANT

## 2023-11-23 PROCEDURE — 25000242 PHARM REV CODE 250 ALT 637 W/ HCPCS: Performed by: INTERNAL MEDICINE

## 2023-11-23 PROCEDURE — 36415 COLL VENOUS BLD VENIPUNCTURE: CPT | Performed by: PHYSICAL THERAPY ASSISTANT

## 2023-11-23 PROCEDURE — 84145 PROCALCITONIN (PCT): CPT | Performed by: INTERNAL MEDICINE

## 2023-11-23 PROCEDURE — 25000003 PHARM REV CODE 250: Performed by: HOSPITALIST

## 2023-11-23 PROCEDURE — 63600175 PHARM REV CODE 636 W HCPCS: Performed by: HOSPITALIST

## 2023-11-23 PROCEDURE — 85025 COMPLETE CBC W/AUTO DIFF WBC: CPT | Performed by: PHYSICAL THERAPY ASSISTANT

## 2023-11-23 PROCEDURE — 99900031 HC PATIENT EDUCATION (STAT)

## 2023-11-23 RX ORDER — HYDROCODONE BITARTRATE AND ACETAMINOPHEN 5; 325 MG/1; MG/1
1 TABLET ORAL EVERY 6 HOURS PRN
Qty: 10 TABLET | Refills: 0 | Status: ON HOLD | OUTPATIENT
Start: 2023-11-23 | End: 2023-12-04 | Stop reason: SDUPTHER

## 2023-11-23 RX ORDER — DOXYCYCLINE HYCLATE 100 MG
100 TABLET ORAL 2 TIMES DAILY
Qty: 14 TABLET | Refills: 0 | Status: ON HOLD | OUTPATIENT
Start: 2023-11-23 | End: 2023-12-04 | Stop reason: HOSPADM

## 2023-11-23 RX ORDER — DIPHENHYDRAMINE HCL 25 MG
25 CAPSULE ORAL EVERY 6 HOURS PRN
Qty: 30 CAPSULE | Refills: 0 | Status: ON HOLD | OUTPATIENT
Start: 2023-11-23 | End: 2024-03-14

## 2023-11-23 RX ORDER — LEVOFLOXACIN 750 MG/1
750 TABLET ORAL DAILY
Qty: 7 TABLET | Refills: 0 | Status: ON HOLD | OUTPATIENT
Start: 2023-11-23 | End: 2023-12-04 | Stop reason: HOSPADM

## 2023-11-23 RX ADMIN — BUSPIRONE HYDROCHLORIDE 7.5 MG: 5 TABLET ORAL at 04:11

## 2023-11-23 RX ADMIN — POLYETHYLENE GLYCOL 3350 17 G: 17 POWDER, FOR SOLUTION ORAL at 08:11

## 2023-11-23 RX ADMIN — PANTOPRAZOLE SODIUM 40 MG: 40 TABLET, DELAYED RELEASE ORAL at 08:11

## 2023-11-23 RX ADMIN — LEVALBUTEROL HYDROCHLORIDE 1.25 MG: 1.25 SOLUTION RESPIRATORY (INHALATION) at 07:11

## 2023-11-23 RX ADMIN — GUAIFENESIN AND CODEINE PHOSPHATE 5 ML: 100; 10 SOLUTION ORAL at 10:11

## 2023-11-23 RX ADMIN — VARENICLINE TARTRATE 0.5 MG: 0.5 TABLET, FILM COATED ORAL at 08:11

## 2023-11-23 RX ADMIN — HYDROCODONE BITARTRATE AND ACETAMINOPHEN 1 TABLET: 5; 325 TABLET ORAL at 10:11

## 2023-11-23 RX ADMIN — LEVALBUTEROL HYDROCHLORIDE 1.25 MG: 1.25 SOLUTION RESPIRATORY (INHALATION) at 12:11

## 2023-11-23 RX ADMIN — BUSPIRONE HYDROCHLORIDE 7.5 MG: 5 TABLET ORAL at 08:11

## 2023-11-23 RX ADMIN — ENOXAPARIN SODIUM 40 MG: 40 INJECTION SUBCUTANEOUS at 08:11

## 2023-11-23 RX ADMIN — METOPROLOL SUCCINATE 50 MG: 50 TABLET, EXTENDED RELEASE ORAL at 08:11

## 2023-11-23 RX ADMIN — GUAIFENESIN AND CODEINE PHOSPHATE 5 ML: 100; 10 SOLUTION ORAL at 04:11

## 2023-11-23 RX ADMIN — LEVOFLOXACIN 750 MG: 5 INJECTION, SOLUTION INTRAVENOUS at 01:11

## 2023-11-23 RX ADMIN — TRIAMCINOLONE ACETONIDE: 1 CREAM TOPICAL at 08:11

## 2023-11-23 RX ADMIN — DIPHENHYDRAMINE HYDROCHLORIDE 25 MG: 25 CAPSULE ORAL at 04:11

## 2023-11-23 RX ADMIN — DIPHENHYDRAMINE HYDROCHLORIDE 25 MG: 25 CAPSULE ORAL at 10:11

## 2023-11-23 RX ADMIN — CHLORHEXIDINE GLUCONATE 15 ML: 1.2 RINSE ORAL at 08:11

## 2023-11-23 RX ADMIN — VANCOMYCIN HYDROCHLORIDE 125 MG: KIT at 10:11

## 2023-11-23 RX ADMIN — LEVALBUTEROL HYDROCHLORIDE 1.25 MG: 1.25 SOLUTION RESPIRATORY (INHALATION) at 03:11

## 2023-11-23 RX ADMIN — DOXYCYCLINE 100 MG: 100 INJECTION, POWDER, LYOPHILIZED, FOR SOLUTION INTRAVENOUS at 08:11

## 2023-11-23 RX ADMIN — Medication 800 MG: at 05:11

## 2023-11-23 RX ADMIN — Medication 800 MG: at 01:11

## 2023-11-23 RX ADMIN — Medication 800 MG: at 08:11

## 2023-11-23 RX ADMIN — HYDROCODONE BITARTRATE AND ACETAMINOPHEN 1 TABLET: 5; 325 TABLET ORAL at 04:11

## 2023-11-23 RX ADMIN — SERTRALINE HYDROCHLORIDE 100 MG: 50 TABLET ORAL at 08:11

## 2023-11-23 RX ADMIN — LACTOBACILLUS ACIDOPHILUS / LACTOBACILLUS BULGARICUS 1 EACH: 100 MILLION CFU STRENGTH GRANULES at 08:11

## 2023-11-23 NOTE — CARE UPDATE
11/23/23 0013   Patient Assessment/Suction   Level of Consciousness (AVPU) alert   Respiratory Effort Normal;Unlabored   Expansion/Accessory Muscles/Retractions no use of accessory muscles   All Lung Fields Breath Sounds equal bilaterally;diminished   Rhythm/Pattern, Respiratory unlabored   PRE-TX-O2   Device (Oxygen Therapy) room air   SpO2 (!) 92 %   Pulse Oximetry Type Intermittent   $ Pulse Oximetry - Multiple Charge Pulse Oximetry - Multiple   Pulse 98   Resp 18   Aerosol Therapy   $ Aerosol Therapy Charges Aerosol Treatment   Daily Review of Necessity (SVN) completed   Respiratory Treatment Status (SVN) given   Treatment Route (SVN) mask   Patient Position (SVN) HOB elevated   Post Treatment Assessment (SVN) breath sounds unchanged   Signs of Intolerance (SVN) none   Breath Sounds Post-Respiratory Treatment   Throughout All Fields Post-Treatment All Fields   Throughout All Fields Post-Treatment no change   Post-treatment Heart Rate (beats/min) 94   Post-treatment Resp Rate (breaths/min) 16   Education   $ Education Bronchodilator;15 min   Respiratory Evaluation   $ Care Plan Tech Time 15 min   $ Eval/Re-eval Charges Re-evaluation   Evaluation For Re-Eval 5+ day

## 2023-11-23 NOTE — NURSING
Patient requesting to go home and refusing heparin gtt at this time. Reached out to Dr Luque. Dr Luque is planning on surgery sometime next week. Reached out to cardiology, and spoke with Jaki Yates NP. Spoke to patient regarding safety and leaving AMA. Juice Charge Nurse at bedside to speak with patient as well. Patient has agreed to stay and accept heparin gtt at this time. Will continue to monitor.

## 2023-11-23 NOTE — NURSING
Dr Leung at bedside to speak with patient. Patient is not comfortable with discharge and is concerned about getting his prescriptions. Reached out to case management, waiting for call back.

## 2023-11-23 NOTE — CARE UPDATE
11/23/23 0750   Patient Assessment/Suction   Level of Consciousness (AVPU) alert   Respiratory Effort Unlabored   All Lung Fields Breath Sounds wheezes, expiratory   PRE-TX-O2   Device (Oxygen Therapy) room air   SpO2 (!) 92 %   Pulse Oximetry Type Intermittent   $ Pulse Oximetry - Multiple Charge Pulse Oximetry - Multiple   Pulse 98   Resp 16   Aerosol Therapy   $ Aerosol Therapy Charges Aerosol Treatment   Daily Review of Necessity (SVN) completed   Respiratory Treatment Status (SVN) given   Treatment Route (SVN) mask   Patient Position (SVN) semi-Campbell's   Post Treatment Assessment (SVN) increased aeration   Signs of Intolerance (SVN) none   Breath Sounds Post-Respiratory Treatment   Throughout All Fields Post-Treatment aeration increased   Post-treatment Heart Rate (beats/min) 95   Post-treatment Resp Rate (breaths/min) 15   Education   $ Education Bronchodilator;15 min   Respiratory Evaluation   $ Care Plan Tech Time 15 min

## 2023-11-23 NOTE — NURSING
Patient notified of discharge orders. Patient is not comfortable with going home at this time. Notified Dr Leung. Dr Leung to come to bedside and evaluate patient.

## 2023-11-23 NOTE — DISCHARGE SUMMARY
[facility] Medicine  Discharge Summary      Patient Name: Jacoby Jean-Baptiste  MRN: [id number]  DAYSI: [id number]  Patient Class: IP- Inpatient  Admission Date: 11/17/2023  Hospital Length of Stay: 6 days  Discharge Date and Time:  11/23/2023 2:53 PM  Attending Physician: Anais Leung MD   Discharging Provider: Anais Leung MD  Primary Care Provider: Hunter Aguilar III, MD    Primary Care Team: Networked reference to record PCT     HPI:   Pt is a 48 year old male with a PMH of DM2, anemia, tobacco use, HLD, and anxiety. Pt presents to ED today after complaints of SOB, chest pain, nausea/vomiting, and abdominal pain x1 week. Pt states he has had progressively worsening symptoms and unable to find relief on own over the last week. Pt has history of colitis, CT of abdomen shows potential mild pyelonephritis. Pt does not have CVA tenderness on exam, creatinine 1.1, and UA not diagnostic. Lipase 8, negative flu/covid, and WBC 12.53. CTA of chest shows no evidence of PE but does have L lower lobe suspicion for septic emboli and/or necrosis. Pt has SOB and requires 2L O2 to keep >90%. Pt given nebulizer treatment in ED and has improvement with SOB. Pt on exam states he is feeling better and most symptoms have diminished including chest pain, SOB, nausea, and abdominal pain. Pt started on antibiotics in ED but pt refused due to hx of cdiff and GI giving him antibiotic free window to abide by. Discussed risk vs benefits with new onset sepsis. Cardiology consulted and echo pending. Pt states he stopped smoking approximately 1 week ago, denies alcohol use, and illicit drug use. Full code.     Procedure(s) (LRB):  BRONCHOSCOPY, WITH FLUOROSCOPY (Left)      Hospital Course:   Patient was admitted with chest pain and CT chest showed  patchy nodular infiltrates, one of which is cavitary in the left lower lobe.  The patient is immunosuppressed from his Remicade injections for his ulcerative  colitis.  He was started on empiric antibiotic therapy.  He was evaluated by Infectious Disease.  Cultures have been negative so far.  He had bronchoscopy by Pulmonary on 11/20.  Will have KATJA by Cardiology on 11/21. BAL cultures negative. KOH and AFB cultures negative. Patient was treated with IV Levaquin and doxycycline. PO vancomycin given for C diff prophylaxis. Patient developed a generalized macular papular rash mainly in the abdomen. Patient will need to follow with rheumatology. Cleared for discharge by pulmonary and ID. He does have leukocytosis but procalcition improved. DC antibiotics Levaquin and Doxy until 11/30 with PO Vancomycin as per ID recommendations.      Goals of Care Treatment Preferences:  Code Status: Full Code    Physical Exam  Vitals: Reviewed  GENERAL:  Alert and oriented x 3. obese  HEENT:  EOMI. Conjunctivae intact.   NECK:  Supple   LUNGS:  No respiratory distress.  Rales bilaterally  CARDIAC:  RRR without murmur, rub or gallop  ABDOMEN:  Soft,  Nontender and nondistended, no rebound or guarding, bowel sounds present   EXTREMITIES:  Peripheral pulses are 2+. Hands and feet are warm. Good capillary refill in fingers (< 2 seconds). No clubbing, cyanosis or edema  SKIN:  diffuse maculopapular rash    Consults:   Consults (From admission, onward)          Status Ordering Provider     Inpatient consult to Anesthesiology  Once        Provider:  Jose Jones MD    Acknowledged RAMBO MCLAUGHLIN     Inpatient consult to Midline team  Once        Provider:  (Not yet assigned)    TAYLOR Ramos     Inpatient consult to Pulmonology  Once        Provider:  Félix Jacinto MD    Completed STEPHANIE VILLAGRAN     Inpatient consult to Infectious Diseases  Once        Provider:  Stephanie Villagran MD    Completed TAYLOR MACIEL     Inpatient consult to Cardiology  Once        Provider:  Ministerio Roman MD    Completed MALLORIE CHIN            Assessment & Plan  Sepsis: secondary to  cavitation pneumonia/ necrosis   Hx of C diff  CPR and procalcitonin down trending   - Antibiotics per ID as above   - outpatient pulmonary follow up    Generalized maculopapular rash   - PRN Benadryl   - outpatient rheumatology follow up     H/o UC on Remicade, immunocompromised   DM  HTN  HLD   Anemia   Anxiety   GERD       Service: Hospital Medicine    Final Active Diagnoses:    Diagnosis Date Noted POA    PRINCIPAL PROBLEM:  Septic embolism [I76] 11/17/2023 Yes    Shortness of breath [R06.02] 11/17/2023 Yes    Chest pain [R07.9] 11/17/2023 Yes    Hypomagnesemia [E83.42] 11/17/2023 Yes    Anxiety [F41.9] 09/21/2022 Yes    Hyperlipidemia [E78.5] 01/29/2022 Yes    Type 2 diabetes mellitus without complication, without long-term current use of insulin [E11.9] 01/29/2022 Yes    Tobacco use [Z72.0] 02/03/2021 Yes      Problems Resolved During this Admission:       Discharged Condition: good    Disposition: Home or Self Care    Follow Up:   Follow-up Information       Hunter Aguilar III, MD Follow up in 1 week(s).    Specialty: Family Medicine  Contact information:  [address]  [address]  [place] [address]  [phone]               Lisa Villarreal MD Follow up in 2 week(s).    Specialties: Pulmonary Disease, Sleep Medicine  Contact information:  [address]  [address]  [place] [id number] [phone]                           Patient Instructions:   No discharge procedures on file.    Significant Diagnostic Studies: Labs: CMP   Recent Labs   Lab 11/22/23  0443 11/23/23  0355    138   K 3.9 3.9    101   CO2 25 26    111*   BUN 9 7   CREATININE 0.9 0.9   CALCIUM 8.5* 8.9   PROT 6.7 7.3   ALBUMIN 3.1* 3.2*   BILITOT 0.7 0.6   ALKPHOS 70 68   AST 24 17   ALT 21 20   ANIONGAP 9 11    and CBC   Recent Labs   Lab 11/22/23  0443 11/23/23  0355   WBC 12.57 15.42*   HGB 11.6* 12.3*   HCT 34.4* 36.6*    357       Pending Diagnostic Studies:       Procedure Component Value Units  Date/Time    Aspergillus antigen [[id number]] Collected: 11/19/23 0540    Order Status: Sent Lab Status: In process Updated: 11/19/23 0616    Specimen: Blood     Glomerular basement membrane antibodies [[id number]] Collected: 11/22/23 0443    Order Status: Sent Lab Status: In process Updated: 11/22/23 0457    Specimen: Blood     Narrative:      Collection has been rescheduled by 2MB at 11/21/2023 19:49 Reason:   Draw with am labs    Histoplasma antigen, urine [[id number]] Collected: 11/19/23 0823    Order Status: Sent Lab Status: In process Updated: 11/19/23 0828    Specimen: Urine, Clean Catch            Medications:  Reconciled Home Medications:      Medication List        START taking these medications      diphenhydrAMINE 25 mg capsule  Commonly known as: BENADRYL  Take 1 capsule (25 mg total) by mouth every 6 (six) hours as needed for Itching.     doxycycline 100 MG tablet  Commonly known as: VIBRA-TABS  Take 1 tablet (100 mg total) by mouth 2 (two) times daily. for 7 days     HYDROcodone-acetaminophen 5-325 mg per tablet  Commonly known as: NORCO  Take 1 tablet by mouth every 6 (six) hours as needed for Pain.     levoFLOXacin 750 MG tablet  Commonly known as: LEVAQUIN  Take 1 tablet (750 mg total) by mouth once daily. for 7 days     vancomycin 125 mg/5 mL Soln  Take 5 mLs (125 mg total) by mouth 2 (two) times a day. for 7 days            CHANGE how you take these medications      varenicline 0.5 mg (11)- 1 mg (42) tablet  Commonly known as: CHANTIX STARTING MONTH BOX  Take one 0.5mg tab by mouth once daily X3 days,then increase to one 0.5mg tab twice daily X4 days,then increase to one 1mg tab twice daily  What changed:   how much to take  how to take this  when to take this            CONTINUE taking these medications      busPIRone 7.5 MG tablet  Commonly known as: BUSPAR  Take 1 tablet (7.5 mg total) by mouth 3 (three) times daily.     dicyclomine 10 MG capsule  Commonly known as: BENTYL  Take 10 mg by mouth  3 (three) times daily as needed.     metFORMIN 500 MG ER 24hr tablet  Commonly known as: GLUCOPHAGE-XR  Take 1 tablet (500 mg total) by mouth 2 (two) times daily with meals.     metoprolol succinate 50 MG 24 hr tablet  Commonly known as: TOPROL-XL  Take 1 tablet (50 mg total) by mouth once daily.     OZEMPIC 1 mg/dose (4 mg/3 mL)  Generic drug: semaglutide  Inject 1 mg into the skin every 7 days.     pantoprazole 40 MG tablet  Commonly known as: PROTONIX  Take 1 tablet (40 mg total) by mouth 2 (two) times daily.     promethazine 12.5 MG Tab  Commonly known as: PHENERGAN  Take 12.5 mg by mouth 4 (four) times daily as needed.     sertraline 100 MG tablet  Commonly known as: ZOLOFT  Take 1 tablet (100 mg total) by mouth once daily.     simvastatin 20 MG tablet  Commonly known as: ZOCOR  Take 1 tablet (20 mg total) by mouth every evening.     zolpidem 10 mg Tab  Commonly known as: AMBIEN  Take 1 tablet (10 mg total) by mouth nightly as needed (insomnia).              Indwelling Lines/Drains at time of discharge:   Lines/Drains/Airways       None                   Time spent on the discharge of patient: 40 minutes         Anais Leung MD  Department of Hospital Medicine  [facility]

## 2023-11-24 LAB — BACTERIA BLD CULT: NORMAL

## 2023-11-24 NOTE — PLAN OF CARE
Problem: Adult Inpatient Plan of Care  Goal: Plan of Care Review  Outcome: Met  Goal: Patient-Specific Goal (Individualized)  Outcome: Met  Goal: Absence of Hospital-Acquired Illness or Injury  Outcome: Met  Goal: Optimal Comfort and Wellbeing  Outcome: Met  Goal: Readiness for Transition of Care  Outcome: Met     Problem: Bariatric Environmental Safety  Goal: Safety Maintained with Care  Outcome: Met     Problem: Diabetes Comorbidity  Goal: Blood Glucose Level Within Targeted Range  Outcome: Met     Problem: Infection  Goal: Absence of Infection Signs and Symptoms  Outcome: Met     Problem: Neutropenia  Goal: Absence of Infection  Outcome: Met

## 2023-11-24 NOTE — NURSING
Patient discharged home. Patient alert and oriented x4. Patient follows all commands appropriately. Discharge instructions provided to patient. Patient verbalizes understanding. Midline removed without complications. VSS

## 2023-11-24 NOTE — NURSING
Notified Dr Leung that patient is requesting to go home with cough medication. Notified that patient has been getting magnesium throughout admit due to low mag. Dr Leung to call in mag and cough medication. Patient updated.

## 2023-11-25 LAB
GBM AB SER IA-ACNC: <0.2 UNITS (ref 0–0.9)
H CAPSUL AG UR QL IA: <0.5

## 2023-11-27 ENCOUNTER — PATIENT OUTREACH (OUTPATIENT)
Dept: ADMINISTRATIVE | Facility: CLINIC | Age: 48
End: 2023-11-27
Payer: COMMERCIAL

## 2023-11-27 LAB — GALACTOMANNAN AG SPEC IA-ACNC: 1.16 INDEX (ref 0–0.49)

## 2023-11-27 NOTE — PROGRESS NOTES
C3 nurse attempted to contact Jacoby Jean-Baptiste  for a TCC post hospital discharge follow up call. No answer. Left voicemail with callback information. The patient has a scheduled HOSPFU appointment with Joanna Estrada NP on 11/30/2023 @ 5 PM.

## 2023-11-28 ENCOUNTER — TELEPHONE (OUTPATIENT)
Dept: INFECTIOUS DISEASES | Facility: CLINIC | Age: 48
End: 2023-11-28

## 2023-11-28 ENCOUNTER — HOSPITAL ENCOUNTER (INPATIENT)
Facility: HOSPITAL | Age: 48
LOS: 6 days | Discharge: HOME | DRG: 166 | End: 2023-12-04
Attending: EMERGENCY MEDICINE | Admitting: INTERNAL MEDICINE
Payer: COMMERCIAL

## 2023-11-28 DIAGNOSIS — F41.9 ANXIETY: ICD-10-CM

## 2023-11-28 DIAGNOSIS — J18.9 CAVITARY PNEUMONIA: ICD-10-CM

## 2023-11-28 DIAGNOSIS — J98.4 PULMONARY CAVITARY LESION: ICD-10-CM

## 2023-11-28 DIAGNOSIS — R07.9 CHEST PAIN: ICD-10-CM

## 2023-11-28 DIAGNOSIS — J98.4 CAVITARY PNEUMONIA: ICD-10-CM

## 2023-11-28 DIAGNOSIS — I49.9 ARRHYTHMIA: ICD-10-CM

## 2023-11-28 DIAGNOSIS — J18.9 PNEUMONIA DUE TO INFECTIOUS ORGANISM, UNSPECIFIED LATERALITY, UNSPECIFIED PART OF LUNG: Primary | ICD-10-CM

## 2023-11-28 DIAGNOSIS — R91.1 PULMONARY NODULE: Primary | ICD-10-CM

## 2023-11-28 DIAGNOSIS — Z78.9 DRUG INTERACTION: ICD-10-CM

## 2023-11-28 DIAGNOSIS — B44.9 ASPERGILLOSIS: ICD-10-CM

## 2023-11-28 DIAGNOSIS — J18.9 PNEUMONIA OF BOTH LUNGS DUE TO INFECTIOUS ORGANISM, UNSPECIFIED PART OF LUNG: ICD-10-CM

## 2023-11-28 DIAGNOSIS — Z51.81 THERAPEUTIC DRUG MONITORING: ICD-10-CM

## 2023-11-28 DIAGNOSIS — I76 SEPTIC EMBOLISM: ICD-10-CM

## 2023-11-28 DIAGNOSIS — R06.02 SOB (SHORTNESS OF BREATH): ICD-10-CM

## 2023-11-28 DIAGNOSIS — K51.919 ULCERATIVE COLITIS WITH COMPLICATION, UNSPECIFIED LOCATION: Chronic | ICD-10-CM

## 2023-11-28 DIAGNOSIS — Z79.60 LONG-TERM USE OF IMMUNOSUPPRESSANT MEDICATION: ICD-10-CM

## 2023-11-28 LAB
ALBUMIN SERPL BCP-MCNC: 3.4 G/DL (ref 3.5–5.2)
ALP SERPL-CCNC: 60 U/L (ref 55–135)
ALT SERPL W/O P-5'-P-CCNC: 20 U/L (ref 10–44)
ANION GAP SERPL CALC-SCNC: 7 MMOL/L (ref 8–16)
AST SERPL-CCNC: 25 U/L (ref 10–40)
BASOPHILS # BLD AUTO: 0.12 K/UL (ref 0–0.2)
BASOPHILS NFR BLD: 0.7 % (ref 0–1.9)
BILIRUB SERPL-MCNC: 0.5 MG/DL (ref 0.1–1)
BNP SERPL-MCNC: 7 PG/ML (ref 0–99)
BUN SERPL-MCNC: 16 MG/DL (ref 6–20)
CALCIUM SERPL-MCNC: 9.3 MG/DL (ref 8.7–10.5)
CHLORIDE SERPL-SCNC: 105 MMOL/L (ref 95–110)
CO2 SERPL-SCNC: 24 MMOL/L (ref 23–29)
CREAT SERPL-MCNC: 1.4 MG/DL (ref 0.5–1.4)
DIFFERENTIAL METHOD: ABNORMAL
EOSINOPHIL # BLD AUTO: 0.7 K/UL (ref 0–0.5)
EOSINOPHIL NFR BLD: 4.3 % (ref 0–8)
ERYTHROCYTE [DISTWIDTH] IN BLOOD BY AUTOMATED COUNT: 13.5 % (ref 11.5–14.5)
EST. GFR  (NO RACE VARIABLE): >60 ML/MIN/1.73 M^2
GLUCOSE SERPL-MCNC: 91 MG/DL (ref 70–110)
HCT VFR BLD AUTO: 38.3 % (ref 40–54)
HGB BLD-MCNC: 12.6 G/DL (ref 14–18)
IMM GRANULOCYTES # BLD AUTO: 0.2 K/UL (ref 0–0.04)
IMM GRANULOCYTES NFR BLD AUTO: 1.2 % (ref 0–0.5)
LACTATE SERPL-SCNC: 1.3 MMOL/L (ref 0.5–1.9)
LYMPHOCYTES # BLD AUTO: 2 K/UL (ref 1–4.8)
LYMPHOCYTES NFR BLD: 11.7 % (ref 18–48)
MCH RBC QN AUTO: 33.9 PG (ref 27–31)
MCHC RBC AUTO-ENTMCNC: 32.9 G/DL (ref 32–36)
MCV RBC AUTO: 103 FL (ref 82–98)
MONOCYTES # BLD AUTO: 1.6 K/UL (ref 0.3–1)
MONOCYTES NFR BLD: 9 % (ref 4–15)
NEUTROPHILS # BLD AUTO: 12.7 K/UL (ref 1.8–7.7)
NEUTROPHILS NFR BLD: 73.1 % (ref 38–73)
NRBC BLD-RTO: 0 /100 WBC
PLATELET # BLD AUTO: 349 K/UL (ref 150–450)
PMV BLD AUTO: 9.8 FL (ref 9.2–12.9)
POTASSIUM SERPL-SCNC: 4.3 MMOL/L (ref 3.5–5.1)
PROCALCITONIN SERPL IA-MCNC: 5.35 NG/ML (ref 0–0.5)
PROT SERPL-MCNC: 8.3 G/DL (ref 6–8.4)
RBC # BLD AUTO: 3.72 M/UL (ref 4.6–6.2)
SODIUM SERPL-SCNC: 136 MMOL/L (ref 136–145)
TROPONIN I SERPL HS-MCNC: 3.5 PG/ML (ref 0–14.9)
WBC # BLD AUTO: 17.31 K/UL (ref 3.9–12.7)

## 2023-11-28 PROCEDURE — 25000003 PHARM REV CODE 250: Performed by: INTERNAL MEDICINE

## 2023-11-28 PROCEDURE — 93010 EKG 12-LEAD: ICD-10-PCS | Mod: ,,, | Performed by: INTERNAL MEDICINE

## 2023-11-28 PROCEDURE — 83880 ASSAY OF NATRIURETIC PEPTIDE: CPT | Performed by: STUDENT IN AN ORGANIZED HEALTH CARE EDUCATION/TRAINING PROGRAM

## 2023-11-28 PROCEDURE — 96365 THER/PROPH/DIAG IV INF INIT: CPT

## 2023-11-28 PROCEDURE — 83605 ASSAY OF LACTIC ACID: CPT | Performed by: EMERGENCY MEDICINE

## 2023-11-28 PROCEDURE — 36415 COLL VENOUS BLD VENIPUNCTURE: CPT | Performed by: EMERGENCY MEDICINE

## 2023-11-28 PROCEDURE — 84145 PROCALCITONIN (PCT): CPT | Performed by: EMERGENCY MEDICINE

## 2023-11-28 PROCEDURE — 87040 BLOOD CULTURE FOR BACTERIA: CPT | Mod: 59 | Performed by: EMERGENCY MEDICINE

## 2023-11-28 PROCEDURE — 87641 MR-STAPH DNA AMP PROBE: CPT | Performed by: INTERNAL MEDICINE

## 2023-11-28 PROCEDURE — 80053 COMPREHEN METABOLIC PANEL: CPT | Performed by: STUDENT IN AN ORGANIZED HEALTH CARE EDUCATION/TRAINING PROGRAM

## 2023-11-28 PROCEDURE — 63600175 PHARM REV CODE 636 W HCPCS: Performed by: INTERNAL MEDICINE

## 2023-11-28 PROCEDURE — 99285 EMERGENCY DEPT VISIT HI MDM: CPT | Mod: 25

## 2023-11-28 PROCEDURE — 93005 ELECTROCARDIOGRAM TRACING: CPT | Performed by: INTERNAL MEDICINE

## 2023-11-28 PROCEDURE — 93010 ELECTROCARDIOGRAM REPORT: CPT | Mod: ,,, | Performed by: INTERNAL MEDICINE

## 2023-11-28 PROCEDURE — 84484 ASSAY OF TROPONIN QUANT: CPT | Performed by: NURSE PRACTITIONER

## 2023-11-28 PROCEDURE — 96361 HYDRATE IV INFUSION ADD-ON: CPT

## 2023-11-28 PROCEDURE — 99900031 HC PATIENT EDUCATION (STAT)

## 2023-11-28 PROCEDURE — 63600175 PHARM REV CODE 636 W HCPCS: Performed by: EMERGENCY MEDICINE

## 2023-11-28 PROCEDURE — 25500020 PHARM REV CODE 255: Performed by: EMERGENCY MEDICINE

## 2023-11-28 PROCEDURE — 12000002 HC ACUTE/MED SURGE SEMI-PRIVATE ROOM

## 2023-11-28 PROCEDURE — 25000003 PHARM REV CODE 250: Performed by: EMERGENCY MEDICINE

## 2023-11-28 PROCEDURE — 87798 DETECT AGENT NOS DNA AMP: CPT | Mod: 59 | Performed by: INTERNAL MEDICINE

## 2023-11-28 PROCEDURE — 87633 RESP VIRUS 12-25 TARGETS: CPT | Performed by: INTERNAL MEDICINE

## 2023-11-28 PROCEDURE — 85025 COMPLETE CBC W/AUTO DIFF WBC: CPT | Performed by: STUDENT IN AN ORGANIZED HEALTH CARE EDUCATION/TRAINING PROGRAM

## 2023-11-28 RX ORDER — IBUPROFEN 200 MG
16 TABLET ORAL
Status: DISCONTINUED | OUTPATIENT
Start: 2023-11-28 | End: 2023-12-04 | Stop reason: HOSPADM

## 2023-11-28 RX ORDER — HYDROXYZINE HYDROCHLORIDE 25 MG/1
25 TABLET, FILM COATED ORAL 3 TIMES DAILY PRN
Status: DISCONTINUED | OUTPATIENT
Start: 2023-11-29 | End: 2023-12-04 | Stop reason: HOSPADM

## 2023-11-28 RX ORDER — ENOXAPARIN SODIUM 100 MG/ML
40 INJECTION SUBCUTANEOUS EVERY 12 HOURS
Status: DISCONTINUED | OUTPATIENT
Start: 2023-11-28 | End: 2023-11-29

## 2023-11-28 RX ORDER — SERTRALINE HYDROCHLORIDE 50 MG/1
100 TABLET, FILM COATED ORAL DAILY
Status: DISCONTINUED | OUTPATIENT
Start: 2023-11-29 | End: 2023-12-02

## 2023-11-28 RX ORDER — NALOXONE HCL 0.4 MG/ML
0.02 VIAL (ML) INJECTION
Status: DISCONTINUED | OUTPATIENT
Start: 2023-11-28 | End: 2023-12-04 | Stop reason: HOSPADM

## 2023-11-28 RX ORDER — ZOLPIDEM TARTRATE 5 MG/1
10 TABLET ORAL NIGHTLY PRN
Status: DISCONTINUED | OUTPATIENT
Start: 2023-11-28 | End: 2023-12-04 | Stop reason: HOSPADM

## 2023-11-28 RX ORDER — IBUPROFEN 200 MG
24 TABLET ORAL
Status: DISCONTINUED | OUTPATIENT
Start: 2023-11-28 | End: 2023-12-04 | Stop reason: HOSPADM

## 2023-11-28 RX ORDER — GLUCAGON 1 MG
1 KIT INJECTION
Status: DISCONTINUED | OUTPATIENT
Start: 2023-11-28 | End: 2023-12-04 | Stop reason: HOSPADM

## 2023-11-28 RX ORDER — SODIUM CHLORIDE 0.9 % (FLUSH) 0.9 %
10 SYRINGE (ML) INJECTION EVERY 12 HOURS PRN
Status: DISCONTINUED | OUTPATIENT
Start: 2023-11-28 | End: 2023-12-04 | Stop reason: HOSPADM

## 2023-11-28 RX ORDER — PANTOPRAZOLE SODIUM 40 MG/1
40 TABLET, DELAYED RELEASE ORAL 2 TIMES DAILY
Status: DISCONTINUED | OUTPATIENT
Start: 2023-11-28 | End: 2023-12-04 | Stop reason: HOSPADM

## 2023-11-28 RX ORDER — INSULIN ASPART 100 [IU]/ML
0-5 INJECTION, SOLUTION INTRAVENOUS; SUBCUTANEOUS
Status: DISCONTINUED | OUTPATIENT
Start: 2023-11-28 | End: 2023-12-04 | Stop reason: HOSPADM

## 2023-11-28 RX ORDER — COLESEVELAM 180 1/1
1875 TABLET ORAL 2 TIMES DAILY WITH MEALS
Status: DISCONTINUED | OUTPATIENT
Start: 2023-11-28 | End: 2023-12-04 | Stop reason: HOSPADM

## 2023-11-28 RX ADMIN — PIPERACILLIN SODIUM,TAZOBACTAM SODIUM 3.38 G: 3; .375 INJECTION, POWDER, FOR SOLUTION INTRAVENOUS at 09:11

## 2023-11-28 RX ADMIN — VANCOMYCIN HYDROCHLORIDE 125 MG: KIT at 10:11

## 2023-11-28 RX ADMIN — COLESEVELAM HYDROCHLORIDE 1875 MG: 625 TABLET, COATED ORAL at 11:11

## 2023-11-28 RX ADMIN — HYDROXYZINE HYDROCHLORIDE 25 MG: 25 TABLET ORAL at 11:11

## 2023-11-28 RX ADMIN — IOHEXOL 100 ML: 350 INJECTION, SOLUTION INTRAVENOUS at 10:11

## 2023-11-28 RX ADMIN — PANTOPRAZOLE SODIUM 40 MG: 40 TABLET, DELAYED RELEASE ORAL at 10:11

## 2023-11-28 RX ADMIN — ENOXAPARIN SODIUM 40 MG: 40 INJECTION SUBCUTANEOUS at 10:11

## 2023-11-28 RX ADMIN — SODIUM CHLORIDE 1000 ML: 9 INJECTION, SOLUTION INTRAVENOUS at 06:11

## 2023-11-28 NOTE — TELEPHONE ENCOUNTER
Component Ref Range & Units 9 d ago   Aspergillus Antigen 0.00 - 0.49 Index 1.16 High    Comment: Performed at:  [address]  [id number]  : Anna Hanson MD, Phone:  [phone]          Pulmonary nodule  -     Aspergillus antigen; Future; Expected date: 11/28/2023    Pulmonary cavitary lesion  -     Aspergillus antigen; Future; Expected date: 11/28/2023

## 2023-11-28 NOTE — FIRST PROVIDER EVALUATION
Emergency Department TeleTriage Encounter Note      CHIEF COMPLAINT    Chief Complaint   Patient presents with    Shortness of Breath     SOB x several weeks. Released from [facility] last for same issue. Not feeling any better.     Nausea       VITAL SIGNS   Initial Vitals [11/28/23 1640]   BP Pulse Resp Temp SpO2   122/83 92 20 97.8 °F (36.6 °C) 97 %      MAP       --            ALLERGIES    Review of patient's allergies indicates:  No Known Allergies    PROVIDER TRIAGE NOTE  This is a teletriage evaluation of a 48 y.o. male presenting to the ED complaining of SOB for the past several weeks. Was recently admitted for the same and underwent bronc.  Pt reports he has not improved. On vancomycin, reports compliance. Denies fever.     No resp distress. Sitting upright in chair.     Initial orders will be placed and care will be transferred to an alternate provider when patient is roomed for a full evaluation. Any additional orders and the final disposition will be determined by that provider.         ORDERS  Labs Reviewed   CBC W/ AUTO DIFFERENTIAL   COMPREHENSIVE METABOLIC PANEL   B-TYPE NATRIURETIC PEPTIDE       ED Orders (720h ago, onward)      Start Ordered     Status Ordering Provider    11/28/23 1653 11/28/23 1652  Troponin I High Sensitivity  STAT         Ordered SELAM MCCOY    11/28/23 1644 11/28/23 1644  Saline lock IV  Once         Ordered EMILIA ARMENTA    11/28/23 1644 11/28/23 1644  CBC auto differential  STAT         Ordered EMILIA ARMENTA    11/28/23 1644 11/28/23 1644  Comprehensive metabolic panel  STAT         Ordered EMILIA ARMENTA    11/28/23 1644 11/28/23 1644  Brain natriuretic peptide  STAT         Ordered EMILIA ARMENTA    11/28/23 1644 11/28/23 1644  EKG 12-lead  Once        Comments: Show to Provider.    Ordered EMILIA ARMENTA    11/28/23 1644 11/28/23 1644  X-Ray Chest AP  1 time imaging         Ordered EMILIA ARMENTA    11/28/23 1644 11/28/23 1644  Pulse Oximetry Q30 Min   Every 30 min      Comments: Every 30 minutes while in lobby   Order ID Start Status Ordering Provider   [id number] 11/28/23 2344 Ordered NOKES, EMILIA K.    11/29/23 0014 Scheduled NOKES, EMILIA K.    11/29/23 0044 Scheduled NOKES, EMILIA K.    11/29/23 0114 Scheduled NOKES, EMILIA K.    11/29/23 0144 Scheduled NOKES, EMILIA K.    11/29/23 0214 Scheduled NOKES, EMILIA K.    11/29/23 0244 Scheduled NOKES, EMILIA K.    11/29/23 0314 Scheduled NOKES, EMILIA K.    11/29/23 0344 Scheduled NOKES, EMILIA K.    11/29/23 0414 Scheduled NOKES, EMILIA K.    11/29/23 0444 Scheduled NOKES, EMILIA K.    11/29/23 0514 Scheduled NOKES, EMILIA K.    11/29/23 0544 Scheduled NOKES, EMILIA K.    11/29/23 0614 Scheduled NOKES, EMILIA K.    11/29/23 0644 Scheduled NOKES, EMILIA K.    11/29/23 0714 Scheduled NOKES, EMILIA K.    11/29/23 0744 Scheduled NOKES, EMILIA K.    11/29/23 0814 Scheduled NOKES, EMILIA K.    11/29/23 0844 Scheduled NOKES, EMILIA K.    11/29/23 0914 Scheduled NOKES, EMILIA K.    11/29/23 0944 Scheduled NOKES, EMILIA K.    11/29/23 1014 Scheduled NOKES, EMILIA K.    11/29/23 1044 Scheduled NOKES, EMILIA K.    11/29/23 1114 Scheduled NOKES, EMILIA K.    11/29/23 1144 Scheduled NOKES, EMILIA K.    11/29/23 1214 Scheduled NOKES, EMILIA K.    11/29/23 1244 Scheduled NOKES, EMILIA K.    11/29/23 1314 Scheduled NOKES, EMILIA K.    11/29/23 1344 Scheduled NOKES, EMILIA K.    11/29/23 1414 Scheduled NOKES, EMILIA K.    11/29/23 1444 Scheduled NOKES, EMILIA K.    11/29/23 1514 Scheduled NOKES, EMILIA K.    11/29/23 1544 Scheduled NOKES, EMILIA K.    11/29/23 1614 Scheduled NOKES, EMILIA K.    11/29/23 1644 Scheduled NOKES, EMILIA K.    11/29/23 1714 Scheduled NOKES, EMILIA K.    11/29/23 1744 Scheduled NOKES, EMILIA K.    11/29/23 1814 Scheduled NOKES, EMILIA K.    11/29/23 1844 Scheduled NOKES, EMILIA K.    11/29/23 1914 Scheduled NOKES, EMILIA K.    11/29/23 1944 Scheduled NOKES, EMILIA K.    11/29/23 2014 Scheduled NOKES,  EMILIA K.    11/29/23 2044 Scheduled NOKES, EMILIA K.    11/29/23 2114 Scheduled NOKES, EMILIA K.    11/29/23 2144 Scheduled NOKES, EMILIA K.    11/29/23 2214 Scheduled NOKES, EMILIA K.    11/29/23 2244 Scheduled NOKES, EMILIA K.    11/29/23 2314 Scheduled NOKES, EMILIA K.    11/29/23 2344 Scheduled NOKES, EMILIA K.    11/30/23 0014 Scheduled NOKES, EMILIA K.    11/30/23 0044 Scheduled NOKES, EMILIA K.    11/30/23 0114 Scheduled NOKES, EMILIA K.    11/30/23 0144 Scheduled NOKES, EMILIA K.    11/30/23 0214 Scheduled NOKES, EMILIA K.    11/30/23 0244 Scheduled NOKES, EMILIA K.    11/30/23 0314 Scheduled NOKES, EMILIA K.    11/30/23 0344 Scheduled NOKES, EMILIA K.    11/30/23 0414 Scheduled NOKES, EMILIA K.    11/30/23 0444 Scheduled NOKES, EMILIA K.    11/30/23 0514 Scheduled NOKES, EMILIA K.    11/30/23 0544 Scheduled NOKES, EMILIA K.    11/30/23 0614 Scheduled NOKES, EMILIA K.    11/30/23 0644 Scheduled NOKES, EMILIA K.    11/30/23 0714 Scheduled NOKES, EMILIA K.    11/30/23 0744 Scheduled NOKES, EMILIA K.    11/30/23 0814 Scheduled NOKES, EMILIA K.    11/30/23 0844 Scheduled NOKES, EMILIA K.    11/30/23 0914 Scheduled NOKES, EMILIA K.    11/30/23 0944 Scheduled NOKES, EMILIA K.    11/30/23 1014 Scheduled NOKES, EMILIA K.    11/30/23 1044 Scheduled NOKES, EMILIA K.    11/30/23 1114 Scheduled NOKES, EMILIA K.    11/30/23 1144 Scheduled NOKES, EMILIA K.    11/30/23 1214 Scheduled NOKES, EMILIA K.    11/30/23 1244 Scheduled NOKES, EMILIA K.    11/30/23 1314 Scheduled NOKES, EMILIA K.    11/30/23 1344 Scheduled NOKES, EMILIA K.    11/30/23 1414 Scheduled NOKES, EMILIA K.    11/30/23 1444 Scheduled NOKES, EMILIA K.    11/30/23 1514 Scheduled NOKES, EMILIA K.    11/30/23 1544 Scheduled NOKES, EMILIA K.    11/30/23 1614 Scheduled NOKES, EMILIA K.    11/30/23 1644 Scheduled NOKES, EMILIA K.    11/30/23 1714 Scheduled NOKES, EMILIA K.    11/30/23 1744 Scheduled NOKES, EMILIA K.    11/30/23 1814 Scheduled NOKES, EMILIA K.     11/30/23 1844 Scheduled NOKES, EMILIA K.    11/30/23 1914 Scheduled NOKES, EMILIA K.    11/30/23 1944 Scheduled NOKES, EMILIA K.    11/30/23 2014 Scheduled NOKES, EMILIA K.    11/30/23 2044 Scheduled NOKES, EMILIA K.    11/30/23 2114 Scheduled NOKES, EMILIA K.    11/30/23 2144 Scheduled NOKES, EMILIA K.    11/30/23 2214 Scheduled NOKES, EMILIA K.    11/30/23 2244 Scheduled NOKES, EMILIA K.    11/30/23 2314 Scheduled NOKES, EMILIA K.    11/30/23 2344 Scheduled NOKES, EMILIA K.    12/01/23 0014 Scheduled NOKES, EMILIA K.    12/01/23 0044 Scheduled NOKES, EMILIA K.    12/01/23 0114 Scheduled NOKES, EMILIA K.    12/01/23 0144 Scheduled NOKES, EMILIA K.    12/01/23 0214 Scheduled NOKES, EMILIA K.    12/01/23 0244 Scheduled NOKES, EMILIA K.    12/01/23 0314 Scheduled NOKES, EMILIA K.    12/01/23 0344 Scheduled NOKES, EMILIA K.    12/01/23 0414 Scheduled NOKES, EMILIA K.    12/01/23 0444 Scheduled NOKES, EMILIA K.    12/01/23 0514 Scheduled NOKES, EMILIA K.    12/01/23 0544 Scheduled NOKES, EMILIA K.    12/01/23 0614 Scheduled NOKES, EMILIA K.    12/01/23 0644 Scheduled NOKES, EMILIA K.    12/01/23 0714 Scheduled NOKES, EMILIA K.    12/01/23 0744 Scheduled NOKES, EMILIA K.    12/01/23 0814 Scheduled NOKES, EMILIA K.    12/01/23 0844 Scheduled NOKES, EMILIA K.    12/01/23 0914 Scheduled NOKES, EMILIA K.    12/01/23 0944 Scheduled NOKES, EMILIA K.    12/01/23 1014 Scheduled NOKES, EMILIA K.    12/01/23 1044 Scheduled NOKES, EMILIA K.    12/01/23 1114 Scheduled NOKES, EMILIA K.    12/01/23 1144 Scheduled NOKES, EMILIA K.    12/01/23 1214 Scheduled NOKES, EMILIA K.    12/01/23 1244 Scheduled NOKES, [facility].    12/01/23 1314 Scheduled NOKES, [facility].    12/01/23 1344 Scheduled NOKES, [facility]    12/01/23 1414 Scheduled NOKES, [facility].    12/01/23 1444 Scheduled NOKES, [facility]    12/01/23 1514 Scheduled NOKES, [facility]    12/01/23 1544 Scheduled NOKES, [facility]    12/01/23 1614 Scheduled NOKES, [facility].    12/01/23 1644  Scheduled NOKES, EMILIA K.    12/01/23 1714 Scheduled NOKES, EMILIA K.    12/01/23 1744 Scheduled NOKES, EMILIA K.    12/01/23 1814 Scheduled NOKES, EMILIA K.    12/01/23 1844 Scheduled NOKES, EMILIA K.    12/01/23 1914 Scheduled NOKES, EMILIA K.    12/01/23 1944 Scheduled NOKES, EMILIA K.    12/01/23 2014 Scheduled NOKES, EMILIA K.    12/01/23 2044 Scheduled NOKES, EMILIA K.    12/01/23 2114 Scheduled NOKES, EMILIA K.    12/01/23 2144 Scheduled NOKES, EMILIA K.    12/01/23 2214 Scheduled NOKES, EMILIA K.    12/01/23 2244 Scheduled NOKES, EMILIA K.    12/01/23 2314 Scheduled NOKES, EMILIA K.    12/01/23 2344 Scheduled NOKES, EMILIA K.       Ordered NOKES, EMILIA K.    11/28/23 1644 11/28/23 1644  Consult Respiratory Therapy if no hx of CHF  Once        Provider:  (Not yet assigned)    Ordered NOKES, EMILIA K.    11/28/23 1644 11/28/23 1644  Cardiac Monitoring - Adult  Continuous        Comments: Notify Physician If:    Ordered NOKES EMILIA K.              Virtual Visit Note: The provider triage portion of this emergency department evaluation and documentation was performed via [employer], a HIPAA-compliant telemedicine application, in concert with a tele-presenter in the room. A face to face patient evaluation with one of my colleagues will occur once the patient is placed in an emergency department room.      DISCLAIMER: This note was prepared with [employer] voice recognition transcription software. Garbled syntax, mangled pronouns, and other bizarre constructions may be attributed to that software system.

## 2023-11-28 NOTE — PROGRESS NOTES
2nd attempt made to reach patient for TCC call. Left voicemail please call [phone] leave first name, last name, and .  I will return your call.

## 2023-11-28 NOTE — PROGRESS NOTES
C3 nurse attempted to contact Jacoby Jean-Baptiste  for a TCC post hospital discharge follow up call. The patient is unable to conduct the call @ this time. The patient requested a callback.    The patient has a schedule HOSPFU with appointment with Joanna Estrada NP on 11/30/2023 @ 5 PM.

## 2023-11-29 LAB
ADENOVIRUS: NOT DETECTED
ALBUMIN SERPL BCP-MCNC: 3.6 G/DL (ref 3.5–5.2)
ALP SERPL-CCNC: 64 U/L (ref 55–135)
ALT SERPL W/O P-5'-P-CCNC: 18 U/L (ref 10–44)
ANION GAP SERPL CALC-SCNC: 6 MMOL/L (ref 8–16)
ANION GAP SERPL CALC-SCNC: 6 MMOL/L (ref 8–16)
AST SERPL-CCNC: 16 U/L (ref 10–40)
BACTERIA #/AREA URNS HPF: NEGATIVE /HPF
BASOPHILS # BLD AUTO: 0.08 K/UL (ref 0–0.2)
BASOPHILS # BLD AUTO: 0.08 K/UL (ref 0–0.2)
BASOPHILS NFR BLD: 0.4 % (ref 0–1.9)
BASOPHILS NFR BLD: 0.4 % (ref 0–1.9)
BILIRUB SERPL-MCNC: 0.6 MG/DL (ref 0.1–1)
BILIRUB UR QL STRIP: NEGATIVE
BORDETELLA PARAPERTUSSIS (IS1001): NOT DETECTED
BORDETELLA PERTUSSIS (PTXP): NOT DETECTED
BUN SERPL-MCNC: 13 MG/DL (ref 6–20)
BUN SERPL-MCNC: 13 MG/DL (ref 6–20)
C DIFF GDH STL QL: NEGATIVE
C DIFF TOX A+B STL QL IA: NEGATIVE
CALCIUM SERPL-MCNC: 9.4 MG/DL (ref 8.7–10.5)
CALCIUM SERPL-MCNC: 9.4 MG/DL (ref 8.7–10.5)
CHLAMYDIA PNEUMONIAE: NOT DETECTED
CHLORIDE SERPL-SCNC: 104 MMOL/L (ref 95–110)
CHLORIDE SERPL-SCNC: 104 MMOL/L (ref 95–110)
CLARITY UR: ABNORMAL
CO2 SERPL-SCNC: 26 MMOL/L (ref 23–29)
CO2 SERPL-SCNC: 26 MMOL/L (ref 23–29)
COLOR UR: YELLOW
CORONAVIRUS 229E, COMMON COLD VIRUS: NOT DETECTED
CORONAVIRUS HKU1, COMMON COLD VIRUS: NOT DETECTED
CORONAVIRUS NL63, COMMON COLD VIRUS: NOT DETECTED
CORONAVIRUS OC43, COMMON COLD VIRUS: NOT DETECTED
CREAT SERPL-MCNC: 1.2 MG/DL (ref 0.5–1.4)
CREAT SERPL-MCNC: 1.2 MG/DL (ref 0.5–1.4)
DIFFERENTIAL METHOD: ABNORMAL
DIFFERENTIAL METHOD: ABNORMAL
EOSINOPHIL # BLD AUTO: 0.5 K/UL (ref 0–0.5)
EOSINOPHIL # BLD AUTO: 0.5 K/UL (ref 0–0.5)
EOSINOPHIL NFR BLD: 2.7 % (ref 0–8)
EOSINOPHIL NFR BLD: 2.7 % (ref 0–8)
ERYTHROCYTE [DISTWIDTH] IN BLOOD BY AUTOMATED COUNT: 13.7 % (ref 11.5–14.5)
ERYTHROCYTE [DISTWIDTH] IN BLOOD BY AUTOMATED COUNT: 13.7 % (ref 11.5–14.5)
EST. GFR  (NO RACE VARIABLE): >60 ML/MIN/1.73 M^2
EST. GFR  (NO RACE VARIABLE): >60 ML/MIN/1.73 M^2
FLUBV RNA NPH QL NAA+NON-PROBE: NOT DETECTED
GLUCOSE SERPL-MCNC: 84 MG/DL (ref 70–110)
GLUCOSE SERPL-MCNC: 89 MG/DL (ref 70–110)
GLUCOSE SERPL-MCNC: 92 MG/DL (ref 70–110)
GLUCOSE SERPL-MCNC: 92 MG/DL (ref 70–110)
GLUCOSE SERPL-MCNC: 94 MG/DL (ref 70–110)
GLUCOSE UR QL STRIP: NEGATIVE
HCT VFR BLD AUTO: 38.6 % (ref 40–54)
HCT VFR BLD AUTO: 38.6 % (ref 40–54)
HGB BLD-MCNC: 12.7 G/DL (ref 14–18)
HGB BLD-MCNC: 12.7 G/DL (ref 14–18)
HGB UR QL STRIP: NEGATIVE
HPIV1 RNA NPH QL NAA+NON-PROBE: NOT DETECTED
HPIV2 RNA NPH QL NAA+NON-PROBE: NOT DETECTED
HPIV3 RNA NPH QL NAA+NON-PROBE: NOT DETECTED
HPIV4 RNA NPH QL NAA+NON-PROBE: NOT DETECTED
HUMAN METAPNEUMOVIRUS: NOT DETECTED
HYALINE CASTS #/AREA URNS LPF: 32 /LPF
IMM GRANULOCYTES # BLD AUTO: 0.22 K/UL (ref 0–0.04)
IMM GRANULOCYTES # BLD AUTO: 0.22 K/UL (ref 0–0.04)
IMM GRANULOCYTES NFR BLD AUTO: 1.1 % (ref 0–0.5)
IMM GRANULOCYTES NFR BLD AUTO: 1.1 % (ref 0–0.5)
INFLUENZA A (SUBTYPES H1,H1-2009,H3): NOT DETECTED
KETONES UR QL STRIP: ABNORMAL
LEUKOCYTE ESTERASE UR QL STRIP: NEGATIVE
LYMPHOCYTES # BLD AUTO: 2.1 K/UL (ref 1–4.8)
LYMPHOCYTES # BLD AUTO: 2.1 K/UL (ref 1–4.8)
LYMPHOCYTES NFR BLD: 10.9 % (ref 18–48)
LYMPHOCYTES NFR BLD: 10.9 % (ref 18–48)
MCH RBC QN AUTO: 34.4 PG (ref 27–31)
MCH RBC QN AUTO: 34.4 PG (ref 27–31)
MCHC RBC AUTO-ENTMCNC: 32.9 G/DL (ref 32–36)
MCHC RBC AUTO-ENTMCNC: 32.9 G/DL (ref 32–36)
MCV RBC AUTO: 105 FL (ref 82–98)
MCV RBC AUTO: 105 FL (ref 82–98)
MICROSCOPIC COMMENT: ABNORMAL
MONOCYTES # BLD AUTO: 1.8 K/UL (ref 0.3–1)
MONOCYTES # BLD AUTO: 1.8 K/UL (ref 0.3–1)
MONOCYTES NFR BLD: 9.2 % (ref 4–15)
MONOCYTES NFR BLD: 9.2 % (ref 4–15)
MRSA SCREEN BY PCR: NEGATIVE
MYCOPLASMA PNEUMONIAE: NOT DETECTED
NEUTROPHILS # BLD AUTO: 14.7 K/UL (ref 1.8–7.7)
NEUTROPHILS # BLD AUTO: 14.7 K/UL (ref 1.8–7.7)
NEUTROPHILS NFR BLD: 75.7 % (ref 38–73)
NEUTROPHILS NFR BLD: 75.7 % (ref 38–73)
NITRITE UR QL STRIP: NEGATIVE
NRBC BLD-RTO: 0 /100 WBC
NRBC BLD-RTO: 0 /100 WBC
PH UR STRIP: 6 [PH] (ref 5–8)
PLATELET # BLD AUTO: 361 K/UL (ref 150–450)
PLATELET # BLD AUTO: 361 K/UL (ref 150–450)
PMV BLD AUTO: 9.8 FL (ref 9.2–12.9)
PMV BLD AUTO: 9.8 FL (ref 9.2–12.9)
POTASSIUM SERPL-SCNC: 3.9 MMOL/L (ref 3.5–5.1)
POTASSIUM SERPL-SCNC: 3.9 MMOL/L (ref 3.5–5.1)
PROT SERPL-MCNC: 8.4 G/DL (ref 6–8.4)
PROT UR QL STRIP: ABNORMAL
RBC # BLD AUTO: 3.69 M/UL (ref 4.6–6.2)
RBC # BLD AUTO: 3.69 M/UL (ref 4.6–6.2)
RBC #/AREA URNS HPF: 2 /HPF (ref 0–4)
RESPIRATORY INFECTION PANEL SOURCE: NORMAL
RSV RNA NPH QL NAA+NON-PROBE: NOT DETECTED
RV+EV RNA NPH QL NAA+NON-PROBE: NOT DETECTED
SARS-COV-2 RNA RESP QL NAA+PROBE: NOT DETECTED
SODIUM SERPL-SCNC: 136 MMOL/L (ref 136–145)
SODIUM SERPL-SCNC: 136 MMOL/L (ref 136–145)
SP GR UR STRIP: >1.03 (ref 1–1.03)
SQUAMOUS #/AREA URNS HPF: 7 /HPF
URN SPEC COLLECT METH UR: ABNORMAL
UROBILINOGEN UR STRIP-ACNC: NEGATIVE EU/DL
WBC # BLD AUTO: 19.48 K/UL (ref 3.9–12.7)
WBC # BLD AUTO: 19.48 K/UL (ref 3.9–12.7)
WBC #/AREA URNS HPF: 5 /HPF (ref 0–5)

## 2023-11-29 PROCEDURE — 25000003 PHARM REV CODE 250: Performed by: INTERNAL MEDICINE

## 2023-11-29 PROCEDURE — 25000003 PHARM REV CODE 250: Performed by: STUDENT IN AN ORGANIZED HEALTH CARE EDUCATION/TRAINING PROGRAM

## 2023-11-29 PROCEDURE — 99223 PR INITIAL HOSPITAL CARE,LEVL III: ICD-10-PCS | Mod: ,,, | Performed by: STUDENT IN AN ORGANIZED HEALTH CARE EDUCATION/TRAINING PROGRAM

## 2023-11-29 PROCEDURE — 87205 SMEAR GRAM STAIN: CPT | Performed by: INTERNAL MEDICINE

## 2023-11-29 PROCEDURE — 80053 COMPREHEN METABOLIC PANEL: CPT | Performed by: INTERNAL MEDICINE

## 2023-11-29 PROCEDURE — 63600175 PHARM REV CODE 636 W HCPCS: Performed by: INTERNAL MEDICINE

## 2023-11-29 PROCEDURE — 30000890 LABCORP MISCELLANEOUS TEST: Performed by: INTERNAL MEDICINE

## 2023-11-29 PROCEDURE — 82962 GLUCOSE BLOOD TEST: CPT

## 2023-11-29 PROCEDURE — 85025 COMPLETE CBC W/AUTO DIFF WBC: CPT | Performed by: INTERNAL MEDICINE

## 2023-11-29 PROCEDURE — 12000002 HC ACUTE/MED SURGE SEMI-PRIVATE ROOM

## 2023-11-29 PROCEDURE — 87070 CULTURE OTHR SPECIMN AEROBIC: CPT | Performed by: INTERNAL MEDICINE

## 2023-11-29 PROCEDURE — 36415 COLL VENOUS BLD VENIPUNCTURE: CPT | Performed by: INTERNAL MEDICINE

## 2023-11-29 PROCEDURE — 63600175 PHARM REV CODE 636 W HCPCS: Performed by: STUDENT IN AN ORGANIZED HEALTH CARE EDUCATION/TRAINING PROGRAM

## 2023-11-29 PROCEDURE — 87449 NOS EACH ORGANISM AG IA: CPT | Performed by: INTERNAL MEDICINE

## 2023-11-29 PROCEDURE — 87305 ASPERGILLUS AG IA: CPT | Performed by: INTERNAL MEDICINE

## 2023-11-29 PROCEDURE — 99223 1ST HOSP IP/OBS HIGH 75: CPT | Mod: ,,, | Performed by: STUDENT IN AN ORGANIZED HEALTH CARE EDUCATION/TRAINING PROGRAM

## 2023-11-29 PROCEDURE — 99223 PR INITIAL HOSPITAL CARE,LEVL III: ICD-10-PCS | Mod: ,,, | Performed by: INTERNAL MEDICINE

## 2023-11-29 PROCEDURE — 99223 1ST HOSP IP/OBS HIGH 75: CPT | Mod: ,,, | Performed by: INTERNAL MEDICINE

## 2023-11-29 PROCEDURE — 81001 URINALYSIS AUTO W/SCOPE: CPT | Performed by: INTERNAL MEDICINE

## 2023-11-29 RX ORDER — DIPHENHYDRAMINE HCL 25 MG
25 CAPSULE ORAL EVERY 6 HOURS PRN
Status: DISCONTINUED | OUTPATIENT
Start: 2023-11-29 | End: 2023-12-04 | Stop reason: HOSPADM

## 2023-11-29 RX ORDER — DICYCLOMINE HYDROCHLORIDE 10 MG/1
10 CAPSULE ORAL 3 TIMES DAILY
Status: DISCONTINUED | OUTPATIENT
Start: 2023-11-29 | End: 2023-12-04 | Stop reason: HOSPADM

## 2023-11-29 RX ORDER — OXYCODONE AND ACETAMINOPHEN 5; 325 MG/1; MG/1
1 TABLET ORAL EVERY 6 HOURS PRN
Status: DISCONTINUED | OUTPATIENT
Start: 2023-11-29 | End: 2023-12-04 | Stop reason: HOSPADM

## 2023-11-29 RX ORDER — VORICONAZOLE 200 MG/1
200 TABLET, FILM COATED ORAL 2 TIMES DAILY
Status: DISCONTINUED | OUTPATIENT
Start: 2023-11-30 | End: 2023-12-02

## 2023-11-29 RX ORDER — ONDANSETRON 2 MG/ML
4 INJECTION INTRAMUSCULAR; INTRAVENOUS EVERY 8 HOURS PRN
Status: DISCONTINUED | OUTPATIENT
Start: 2023-11-29 | End: 2023-12-04 | Stop reason: HOSPADM

## 2023-11-29 RX ORDER — METOPROLOL SUCCINATE 50 MG/1
50 TABLET, EXTENDED RELEASE ORAL DAILY
Status: DISCONTINUED | OUTPATIENT
Start: 2023-11-29 | End: 2023-12-04 | Stop reason: HOSPADM

## 2023-11-29 RX ORDER — NYSTATIN 100000 [USP'U]/ML
500000 SUSPENSION ORAL 4 TIMES DAILY
Status: DISCONTINUED | OUTPATIENT
Start: 2023-11-29 | End: 2023-11-29

## 2023-11-29 RX ORDER — CHLORHEXIDINE GLUCONATE ORAL RINSE 1.2 MG/ML
15 SOLUTION DENTAL 2 TIMES DAILY
Status: DISCONTINUED | OUTPATIENT
Start: 2023-11-29 | End: 2023-12-04 | Stop reason: HOSPADM

## 2023-11-29 RX ORDER — VORICONAZOLE 200 MG/1
400 TABLET, FILM COATED ORAL 2 TIMES DAILY
Status: COMPLETED | OUTPATIENT
Start: 2023-11-29 | End: 2023-11-29

## 2023-11-29 RX ADMIN — PANTOPRAZOLE SODIUM 40 MG: 40 TABLET, DELAYED RELEASE ORAL at 09:11

## 2023-11-29 RX ADMIN — DIPHENHYDRAMINE HYDROCHLORIDE 25 MG: 25 CAPSULE ORAL at 09:11

## 2023-11-29 RX ADMIN — DICYCLOMINE HYDROCHLORIDE 10 MG: 10 CAPSULE ORAL at 02:11

## 2023-11-29 RX ADMIN — COLESEVELAM HYDROCHLORIDE 1875 MG: 625 TABLET, COATED ORAL at 06:11

## 2023-11-29 RX ADMIN — ZOLPIDEM TARTRATE 10 MG: 5 TABLET, COATED ORAL at 09:11

## 2023-11-29 RX ADMIN — SERTRALINE HYDROCHLORIDE 100 MG: 50 TABLET ORAL at 09:11

## 2023-11-29 RX ADMIN — VORICONAZOLE 400 MG: 200 TABLET, FILM COATED ORAL at 11:11

## 2023-11-29 RX ADMIN — BUSPIRONE HYDROCHLORIDE 7.5 MG: 5 TABLET ORAL at 02:11

## 2023-11-29 RX ADMIN — METOPROLOL SUCCINATE 50 MG: 50 TABLET, EXTENDED RELEASE ORAL at 02:11

## 2023-11-29 RX ADMIN — PIPERACILLIN AND TAZOBACTAM 3.38 G: 3; .375 INJECTION, POWDER, LYOPHILIZED, FOR SOLUTION INTRAVENOUS; PARENTERAL at 02:11

## 2023-11-29 RX ADMIN — OXYCODONE AND ACETAMINOPHEN 1 TABLET: 5; 325 TABLET ORAL at 02:11

## 2023-11-29 RX ADMIN — ONDANSETRON 4 MG: 2 INJECTION INTRAMUSCULAR; INTRAVENOUS at 02:11

## 2023-11-29 RX ADMIN — DICYCLOMINE HYDROCHLORIDE 10 MG: 10 CAPSULE ORAL at 09:11

## 2023-11-29 RX ADMIN — NYSTATIN 500000 UNITS: 100000 SUSPENSION ORAL at 12:11

## 2023-11-29 RX ADMIN — BUSPIRONE HYDROCHLORIDE 7.5 MG: 5 TABLET ORAL at 09:11

## 2023-11-29 RX ADMIN — VORICONAZOLE 400 MG: 200 TABLET, FILM COATED ORAL at 09:11

## 2023-11-29 RX ADMIN — PIPERACILLIN SODIUM,TAZOBACTAM SODIUM 3.38 G: 3; .375 INJECTION, POWDER, FOR SOLUTION INTRAVENOUS at 07:11

## 2023-11-29 RX ADMIN — ENOXAPARIN SODIUM 40 MG: 40 INJECTION SUBCUTANEOUS at 12:11

## 2023-11-29 RX ADMIN — CHLORHEXIDINE GLUCONATE 15 ML: 1.2 RINSE ORAL at 09:11

## 2023-11-29 RX ADMIN — DIPHENHYDRAMINE HYDROCHLORIDE 25 MG: 25 CAPSULE ORAL at 07:11

## 2023-11-29 RX ADMIN — PIPERACILLIN AND TAZOBACTAM 4.5 G: 4; .5 INJECTION, POWDER, LYOPHILIZED, FOR SOLUTION INTRAVENOUS at 09:11

## 2023-11-29 RX ADMIN — DIPHENHYDRAMINE HYDROCHLORIDE 25 MG: 25 CAPSULE ORAL at 02:11

## 2023-11-29 RX ADMIN — COLESEVELAM HYDROCHLORIDE 1875 MG: 625 TABLET, COATED ORAL at 09:11

## 2023-11-29 NOTE — PROGRESS NOTES
Patient no longer qualifies for a [facility] hospital discharge follow up call.  Patient is in-patient at [facility] on 11/28/2023 @ 10 PM with dx. pneumonia.

## 2023-11-29 NOTE — ED NOTES
Message received from WILNER Hammond, I am to contact attending for orders, informed she is infectious disease    Secure chat sent to dr. Aguilar, informing provider of pt's c/o HA, request for order for diet

## 2023-11-29 NOTE — HOSPITAL COURSE
Assumed care of this patient on 11/30/23.  Patient with history of morbid obesity with BMI 46, ulcerative colitis, followed by Dr. Jean, previously on Remicade, diabetes mellitus, recent ex-smoker 3 weeks, anxiety, history of SVT status post prior ablation.  Recent [facility] admission 11/17 to 11/23 with concern for cavitating pneumonia versus septic emboli with necrosis, underwent bronchoscopy, KATJA, no vegetation or atrial thrombus, discharge to complete course of antibiotics.  Re-presented as no improvement on discharge, CT with progressive changes, multifocal nodular consolidation with central cavity possible due to septic emboli, lymphadenopathy.  Seen by Pulmonary and Infectious Disease.  Started on piperacillin/tazobactam and voriconazole (aspergillus antigen 1.16).  EKG on 11/28 with read as atrial flutter with 4-1, but appears to be artifact, did recently have outpatient monitor, predominant rhythm sinus with brief runs of SVT, followed by cardiologist Dr. Roman, seen by cardiology here and recommends outpatient follow up.  On 11/30 underwent bronchoscopy with transbronchial biopsy submitted, procedure complicated by small apical pneumothorax, washings negative for malignancy but + inflammation, biopsy + necrotizing granulomatous inflammation, focal organizing chronic interstitial pneumonitis, no vasculitis, no organisms.  Infectious Disease recommends discharge on voriconazole 400 mg b.i.d., BuSpar to decreased to 2.5 mg daily, Zoloft discontinued while on voriconazole, EKG in 3-4 days to monitor QTC, separate from colesevelam.  He did developed mild blood-tinged phlegm post biopsy and he was monitored in hospital with improvement.  On 12/04 no acute issues, cleared by consultants for discharge and appears medically stable for discharge.  After communicated with consultants recommends for discharge on voriconazole 400 mg twice daily, he will have levels checked on morning of 12/8, no indication for  prophylactic vancomycin on voriconazole.  He will follow-up with outpatient Infectious Disease and Pulmonary, Dr. Villarreal.  All new medications were sent to in-house pharmacy prior to discharge.  Discharge plan including medication, follow-up as well as return precautions were reviewed, he expressed understanding, no questions or concerns.  Discussed with consultants and nursing on day of discharge.    Discharge examination   Sitting in chair, alert, on RA

## 2023-11-29 NOTE — ED NOTES
"1ST CONTACT WITH PT, CARE ASSUMED, REPORT RECEIVED FROM NIGHT NURSE ZACHARY    LYING SEMI HAYWOOD IN STRETCHER, AWAKE, UPON ENTERING ROOM AND ASKING PT HOW HE IS DOING, PT STATES " TERRIBLE" FURTHER ASSESSMENT AND PT STATES " I BEEN HERE FOR 14 HOURS, I HAVE A HEADACHE, I FEEL TERRIBLE, TIRES, HUNGRY, ITCHY, THIRSTY" APOLOGIES MADE TO PT AND I INFORM PT TO ALLOW ME TO LOOK AT THE ORDERS TO LOOK TO SEE IF I CAN GIVE HIM ANYTHING FOR HIS DISCOMFORT AND OR I WILL CONTACT HIS MD, PT VERBALIZED UNDERSTANDING,     A&OX3, IN NO ACUTE DISTRESS, RESP EVEN NON LABORED SKIN DRY, WARM    REPORTS REASON FOR ER VISIT" CAUSE I WAS HERE LAST WEEK"" I WASN'T GETTING ANY BETTER SO I CAME BACK" PT REPORTS " SHORTNESS OF BREATH, WEAK"     CURRENTLY PT DENIES PAIN, STATING " JUST THE HEADACHE" RATES VIRAMONTES, 6/10    PT INFORMED OF DIAGNOSIS, PNEUMONIA, PT STATES " NOBODY TOLD ME" APOLOGIES MADE TO PT    NOTED PT ON CARDIAC MONITOR, BP, PULSE OB, SNR ON CARDIAC MONITOR, CALL LIGHT WITHIN REACH, SR UP X1,   "

## 2023-11-29 NOTE — CONSULTS
Pulmonary/Critical Care Consult      PATIENT NAME: Jacoby Jean-Baptiste  MRN: [id number]  TODAY'S DATE: 2023  12:55 PM  ADMIT DATE: 2023  AGE: 48 y.o. : 1975    CONSULT REQUESTED BY: No att. providers found    REASON FOR CONSULT:   Cavitary pneumonia, progressive    HPI:  The patient is a 48-year-old male who returned to the emergency room because he was not feeling any better.  He had been hospitalized earlier in the month with a multilobar nodular infiltrate with the left lower lobe nodule cavitating.  He underwent bronchoscopy which was unrevealing.  His blood cultures were negative.  In the interim since he was discharged, he has not felt any better despite completing his antibiotics.  Not clearing any mucus  He is not running fever.  He has an Aspergillus antigen that is positive at 1.16.  The patient has been immunosuppressed with Remicade for his ulcerative colitis.  He has not had that in weeks now.  His CT on presentation is significantly worse this time than last time with a left-sided predominance.    REVIEW OF SYSTEMS  GENERAL: Feeling poorly  EYES: Vision is good.  ENT: No sinusitis or pharyngitis.   HEART: No chest pain or palpitations.  LUNGS:  He coughs but does not clear any mucus  GI: No Nausea, vomiting, constipation, diarrhea, or reflux.  : No dysuria, hesitancy, or nocturia.  SKIN: No lesions or rashes.  MUSCULOSKELETAL: No joint pain or myalgias.  NEURO: No headaches or neuropathy.  LYMPH: No edema or adenopathy.  PSYCH: No anxiety or depression.  ENDO: No weight change.    ALLERGIES  Review of patient's allergies indicates:  No Known Allergies    INPATIENT SCHEDULED MEDICATIONS   busPIRone  7.5 mg Oral TID    chlorhexidine  15 mL Mouth/Throat BID    colesevelam  1,875 mg Oral BID WM    dicyclomine  10 mg Oral TID    enoxparin  40 mg Subcutaneous Q12H (treatment, non-standard time)    metoprolol succinate  50 mg Oral Daily    nystatin  500,000 Units Oral QID    pantoprazole  40  mg Oral BID    piperacillin-tazobactam (Zosyn) IV (PEDS and ADULTS) (extended infusion is not appropriate)  3.375 g Intravenous Q8H    sertraline  100 mg Oral Daily    vancomycin  125 mg Oral Q6H    voriconazole  400 mg Oral BID    Followed by    [START ON 11/30/2023] voriconazole  200 mg Oral BID         MEDICAL AND SURGICAL HISTORY  Past Medical History:   Diagnosis Date    Diabetes mellitus 01/2022    Hyperlipidemia 2015    Hypertension 2015    Insomnia 2015   Ulcerative colitis  Clostridium difficile enterocolitis  Past Surgical History:   Procedure Laterality Date    BRONCHOSCOPY WITH FLUOROSCOPY Left 11/20/2023    Procedure: BRONCHOSCOPY, WITH FLUOROSCOPY;  Surgeon: Lisa Villarreal MD;  Location: [facility];  Service: Pulmonary;  Laterality: Left;    CARDIAC ELECTROPHYSIOLOGY MAPPING AND ABLATION  2017    SVT    CHOLECYSTECTOMY  2011    COLONOSCOPY N/A 5/3/2022    Procedure: COLONOSCOPY;  Surgeon: Shan Jean III, MD;  Location: [facility];  Service: Endoscopy;  Laterality: N/A;    COLONOSCOPY WITH FECAL MICROBIOTA TRANSFER N/A 3/21/2023    Procedure: COLONOSCOPY, WITH FECAL MICROBIOTA TRANSFER;  Surgeon: David Millan MD;  Location: [facility];  Service: Endoscopy;  Laterality: N/A;    ESOPHAGOGASTRODUODENOSCOPY N/A 4/24/2023    Procedure: EGD (ESOPHAGOGASTRODUODENOSCOPY);  Surgeon: Shan Jean III, MD;  Location: [facility];  Service: Endoscopy;  Laterality: N/A;    GANGLION CYST EXCISION Left 1992    UMBILICAL HERNIA REPAIR  2011    with mesh       ALCOHOL, TOBACCO AND DRUG USE  Social History     Tobacco Use   Smoking Status Every Day    Current packs/day: 1.00    Average packs/day: 1 pack/day for 30.0 years (30.0 ttl pk-yrs)    Types: Cigarettes   Smokeless Tobacco Never   Tobacco Comments    DO NOT SMOKE DOS     Social History     Substance and Sexual Activity   Alcohol Use Yes    Comment: ocass     Social History     Substance and Sexual Activity   Drug Use Not Currently       FAMILY  HISTORY  Family History   Problem Relation Age of Onset    Asthma Mother        VITAL SIGNS (MOST RECENT)  Temp: 97.4 °F (36.3 °C) (11/29/23 1226)  Pulse: 110 (11/29/23 1226)  Resp: 20 (11/29/23 1226)  BP: 101/75 (11/29/23 1226)  SpO2: 96 % (11/29/23 1226)    INTAKE AND OUTPUT (LAST 24 HOURS):  Intake/Output Summary (Last 24 hours) at 11/29/2023 1255  Last data filed at 11/28/2023 2334  Gross per 24 hour   Intake 100 ml   Output --   Net 100 ml       WEIGHT  Wt Readings from Last 1 Encounters:   11/28/23 130.6 kg (288 lb)       PHYSICAL EXAM  GENERAL:  Mid aged patient in no distress.  Sitting in the chair, working on his computer  HEENT: Pupils equal and reactive. Extraocular movements intact. Nose intact. Pharynx moist.  NECK: Supple.   HEART: Regular rate and rhythm. No murmur or gallop auscultated.  LUNGS:  The patient has funny noises in his left lower lobe.  It sounds somewhat like a rub and some what like stomach noises.. Lung excursion symmetrical. No change in fremitus. No adventitial noises.  ABDOMEN: Bowel sounds present. Non-tender, no masses palpated.  : Normal anatomy.  EXTREMITIES: Normal muscle tone and joint movement, no cyanosis or clubbing.   LYMPHATICS: No adenopathy palpated, no edema.  SKIN: Dry, intact, no lesions.   NEURO: Cranial nerves II-XII intact. Motor strength 5/5 bilaterally, upper and lower extremities.  PSYCH: Appropriate affect      CBC LAST (LAST 24 HOURS)  Recent Labs   Lab 11/28/23  1835   WBC 17.31*   RBC 3.72*   HGB 12.6*   HCT 38.3*   *   MCH 33.9*   MCHC 32.9   RDW 13.5      MPV 9.8   GRAN 73.1*  12.7*   LYMPH 11.7*  2.0   MONO 9.0  1.6*   BASO 0.12   NRBC 0       CHEMISTRY LAST (LAST 24 HOURS)  Recent Labs   Lab 11/28/23  1835      K 4.3      CO2 24   ANIONGAP 7*   BUN 16   CREATININE 1.4   GLU 91   CALCIUM 9.3   ALBUMIN 3.4*   PROT 8.3   ALKPHOS 60   ALT 20   AST 25   BILITOT 0.5         CARDIAC PROFILE (LAST 24 HOURS)  Recent Labs   Lab  11/28/23  1835   BNP 7   TROPONINIHS 3.5       LAST 7 DAYS MICROBIOLOGY   Microbiology Results (last 7 days)       Procedure Component Value Units Date/Time    Culture, Respiratory with Gram Stain [[id number]] Collected: 11/29/23 1118    Order Status: Sent Specimen: Respiratory from Sputum Updated: 11/29/23 1133    Clostridium difficile EIA [[id number]] Collected: 11/29/23 1118    Order Status: Sent Specimen: Stool Updated: 11/29/23 1133    Respiratory Infection Panel (PCR), Nasopharyngeal [[id number]] Collected: 11/28/23 2258    Order Status: Completed Specimen: Nasopharyngeal Swab Updated: 11/29/23 0611     Respiratory Infection Panel Source NP swab     Adenovirus Not Detected     Coronavirus 229E, Common Cold Virus Not Detected     Coronavirus HKU1, Common Cold Virus Not Detected     Coronavirus NL63, Common Cold Virus Not Detected     Coronavirus OC43, Common Cold Virus Not Detected     Comment: Coronavirus strains 229E, HKU1, NL63, and OC43 can cause the common   cold   and are not associated with the respiratory disease outbreak caused   by  the COVID-19 (SARS-CoV-2 novel Coronavirus) strain.           SARS-CoV2 (COVID-19) Qualitative PCR Not Detected     Human Metapneumovirus Not Detected     Human Rhinovirus/Enterovirus Not Detected     Influenza A (subtypes H1, H1-2009,H3) Not Detected     Influenza B Not Detected     Parainfluenza Virus 1 Not Detected     Parainfluenza Virus 2 Not Detected     Parainfluenza Virus 3 Not Detected     Parainfluenza Virus 4 Not Detected     Respiratory Syncytial Virus Not Detected     Bordetella Parapertussis (GL5986) Not Detected     Bordetella pertussis (ptxP) Not Detected     Chlamydia pneumoniae Not Detected     Mycoplasma pneumoniae Not Detected     Comment: Respiratory Infection Panel testing performed by Multiplex PCR.       Narrative:      Respiratory Infection Panel source->NP Swab    Blood Culture #1 **CANNOT BE ORDERED STAT** [[id number]] Collected: 11/28/23 2010     Order Status: Completed Specimen: Blood from Antecubital, Right Updated: 11/29/23 0358     Blood Culture, Routine No Growth to date    Blood Culture #2 **CANNOT BE ORDERED STAT** [[id number]] Collected: 11/28/23 2021    Order Status: Completed Specimen: Blood Updated: 11/29/23 0358     Blood Culture, Routine No Growth to date    MRSA Screen by PCR [[id number]] Collected: 11/28/23 2258    Order Status: Completed Specimen: Nasopharyngeal Swab from Nasal Updated: 11/29/23 0210     MRSA SCREEN BY PCR Negative            MOST RECENT IMAGING  CT Chest Abdomen Pelvis With IV Contrast (XPD) Routine Oral Contrast  EXAM DESCRIPTION:  CT CHEST ABDOMEN PELVIS WITH IV CONTRAST (XPD)    CLINICAL HISTORY:  48 years  Male  Sepsis    TECHNIQUE:  CT chest, abdomen, pelvis protocol with intravenous [contrast. No oral contrast was utilized..  All CT scans at this facility use dose modulation, iterative reconstruction, and/or weight based dosing when appropriate to reduce radiation dose to as low as reasonably achievable.    COMPARISON: Portable view of the chest obtained earlier in the day for 50 8:00 PM. CT angiogram of the chest as well as CT scan of the abdomen and pelvis November 17, 2023    FINDINGS:    Chest:  Lungs: No pneumothorax or pleural effusion. There is been development of multiple areas of nodular consolidation throughout the lung parenchyma, some of which have central cavitation. Findings are suggestive of septic emboli. The largest area of consolidation is present in the lingula. Diffuse airway thickening is seen. The trachea is patent.  Mediastinum: Heart size is within normal limits. No pericardial abnormality. Mediastinal lymphadenopathy is again seen and is more pronounced on today's exam. Lymph nodes measure up to 1.8 cm and are present in the pretracheal, prevascular, AP window and subcarinal region. Esophagus is unremarkable. Mediastinal  Vascular: Minimal calcified plaque in the aorta. No aneurysm. The right  and left main pulmonary arteries are patent. Distal to this the vessels are not well seen.  Bones: Soft tissues of the chest wall are unremarkable. No acute osseous abnormality.    Abdomen:  Liver and Biliary tree:Diffuse fatty infiltration of the liver. The gallbladder is surgically absent. Portal vein branches are patent. Liver is enlarged measuring 22 cm in length.  Pancreas:Unremarkable  Spleen:Spleen is enlarged measuring 14.1 cm.  Kidneys:  Small nonobstructing kidney stones are seen bilaterally. There is symmetric renal enhancement. No perinephric abnormality.  Adrenal glands:Within normal limits  Vascular structures:No occlusion or stenosis. No retroperitoneal hematoma.  Retroperitoneum:  There is areas of actual calcification within the omental fat predominantly in the central low abdomen are unchanged. This may represent areas of fat necrosis.  Abdominal Wall:  Normal  GI:  Bowel is of normal caliber.  No obstruction.  No focal bowel wall thickening.  Appendix: The appendix is not seen with confidence.  General: No free air. No free fluid.    Pelvis:  Lymph nodes:  No pelvic mass or adenopathy.  Organs:  Bladder appears normal  Bones  unchanged.    IMPRESSION:  1.  Interval development of multiple areas of nodular consolidation throughout the lung parenchyma, some of which have central cavitation. Findings are suggestive of septic emboli. Necrotizing pneumonia could also have this appearance  2.  Hepatosplenomegaly with fatty infiltration of the liver.  3.  Small nonobstructing kidney stones bilaterally.  .    Electronically signed by:  Blanca Sykes MD  11/29/2023 12:51 AM CST Workstation: [id number]      CURRENT VISIT EKG  No results found for this visit on 11/28/23.    ECHOCARDIOGRAM RESULTS  Results for orders placed during the hospital encounter of 11/17/23    Echo    Interpretation Summary    Left Ventricle: The left ventricle is normal in size. Normal wall thickness. There is concentric remodeling.  Normal wall motion. There is normal systolic function with a visually estimated ejection fraction of 55 - 60%. There is normal diastolic function.    Right Ventricle: Normal right ventricular cavity size. Wall thickness is normal. Right ventricle wall motion  is normal. Systolic function is normal.    IVC/SVC: Normal venous pressure at 3 mmHg.      The patient is on room air         IMPRESSION AND PLAN  Progressive pneumonic findings, patchy and in some places cavitating.  Mostly on the left  Positive aspergillus antigen, negative fungal smear and cultures to date  Procalcitonin positive and Aspergillus is not known to do this.  Leukocytosis worsening   Voriconazole will cover whatever nystatin is ordered for  Prior workup with ANCAs and anti-glomerular membrane antibodies were negative  Minimally positive   Prior KATJA negative    Will bronch in the morning with transbronchial biopsies to try to confirm diagnosis of Aspergillus and rule out any other process  Voriconazole ordered  NPO after midnight  Enoxaparin stopped    Lisa Villarreal MD  Date of Service: 11/29/2023  12:55 PM

## 2023-11-29 NOTE — H&P
[facility] - Emergency Dept    History & Physical      Patient Name: Jacoby Jean-Baptiste  MRN: [id number]  Admission Date: 11/28/2023  Attending Physician: Andrea Melendez MD   Primary Care Provider: Hunter Aguilar III, MD         Patient information was obtained from patient, past medical records, and ER records.     Subjective:     Principal Problem:<principal problem not specified>    Chief Complaint:   Chief Complaint   Patient presents with    Shortness of Breath     SOB x several weeks. Released from [facility] last for same issue. Not feeling any better.     Nausea        HPI: 48 year old male with a PMH Cdiff, UC on Remicade, DM2, anemia, tobacco use, HLD, and anxiety.    Patient recently discharged after hospital admission for shortness breath, chest pain abdominal pain associated with CT chest that showed patchy nodular infiltrates, one of which is cavitary in the left lower lobe, suspicion for septic emboli and/or necrosis .  Patient treated with antibiotics, prophylactic p.o. vancomycin.  BAL cultures negative. KOH and AFB cultures negative, KATJA negative for IE.  Anti GBM was negative.  Patient was treated with IV Levaquin and doxycycline. PO vancomycin given for C diff prophylaxis.    Patient states he has not really gotten any better since being treated for his condition.  Some tests were pending after discharge.  His aspergillus antigen is positive at 1.16    Past Medical History:   Diagnosis Date    Diabetes mellitus 01/2022    Hyperlipidemia 2015    Hypertension 2015    Insomnia 2015       Past Surgical History:   Procedure Laterality Date    BRONCHOSCOPY WITH FLUOROSCOPY Left 11/20/2023    Procedure: BRONCHOSCOPY, WITH FLUOROSCOPY;  Surgeon: Lisa Villarreal MD;  Location: [facility];  Service: Pulmonary;  Laterality: Left;    CARDIAC ELECTROPHYSIOLOGY MAPPING AND ABLATION  2017    SVT    CHOLECYSTECTOMY  2011    COLONOSCOPY N/A 5/3/2022    Procedure: COLONOSCOPY;  Surgeon: Shan ONEILL  Ted MENDOZA MD;  Location: [facility];  Service: Endoscopy;  Laterality: N/A;    COLONOSCOPY WITH FECAL MICROBIOTA TRANSFER N/A 3/21/2023    Procedure: COLONOSCOPY, WITH FECAL MICROBIOTA TRANSFER;  Surgeon: David Millan MD;  Location: [facility] ENDO;  Service: Endoscopy;  Laterality: N/A;    ESOPHAGOGASTRODUODENOSCOPY N/A 4/24/2023    Procedure: EGD (ESOPHAGOGASTRODUODENOSCOPY);  Surgeon: Shan Jean III, MD;  Location: [facility] ENDO;  Service: Endoscopy;  Laterality: N/A;    GANGLION CYST EXCISION Left 1992    UMBILICAL HERNIA REPAIR  2011    with mesh       Review of patient's allergies indicates:  No Known Allergies    Current Facility-Administered Medications on File Prior to Encounter   Medication    lactated ringers infusion     Current Outpatient Medications on File Prior to Encounter   Medication Sig    busPIRone (BUSPAR) 7.5 MG tablet Take 1 tablet (7.5 mg total) by mouth 3 (three) times daily.    dicyclomine (BENTYL) 10 MG capsule Take 10 mg by mouth 3 (three) times daily as needed.    diphenhydrAMINE (BENADRYL) 25 mg capsule Take 1 capsule (25 mg total) by mouth every 6 (six) hours as needed for Itching.    doxycycline (VIBRA-TABS) 100 MG tablet Take 1 tablet (100 mg total) by mouth 2 (two) times daily. for 7 days    HYDROcodone-acetaminophen (NORCO) 5-325 mg per tablet Take 1 tablet by mouth every 6 (six) hours as needed for Pain.    levoFLOXacin (LEVAQUIN) 750 MG tablet Take 1 tablet (750 mg total) by mouth once daily. for 7 days    metFORMIN (GLUCOPHAGE-XR) 500 MG ER 24hr tablet Take 1 tablet (500 mg total) by mouth 2 (two) times daily with meals.    metoprolol succinate (TOPROL-XL) 50 MG 24 hr tablet Take 1 tablet (50 mg total) by mouth once daily.    pantoprazole (PROTONIX) 40 MG tablet Take 1 tablet (40 mg total) by mouth 2 (two) times daily.    promethazine (PHENERGAN) 12.5 MG Tab Take 12.5 mg by mouth 4 (four) times daily as needed.    semaglutide (OZEMPIC) 1 mg/dose (4 mg/3 mL) Inject 1 mg  into the skin every 7 days.    sertraline (ZOLOFT) 100 MG tablet Take 1 tablet (100 mg total) by mouth once daily.    simvastatin (ZOCOR) 20 MG tablet Take 1 tablet (20 mg total) by mouth every evening.    vancomycin 125 mg/5 mL Soln Take 5 mLs (125 mg total) by mouth 2 (two) times a day. for 7 days    varenicline (CHANTIX STARTING MONTH BOX) 0.5 mg (11)- 1 mg (42) tablet Take one 0.5mg tab by mouth once daily X3 days,then increase to one 0.5mg tab twice daily X4 days,then increase to one 1mg tab twice daily (Patient taking differently: Take 1 tablet by mouth 2 (two) times daily. Take one 0.5mg tab by mouth once daily X3 days,then increase to one 0.5mg tab twice daily X4 days,then increase to one 1mg tab twice daily)    zolpidem (AMBIEN) 10 mg Tab Take 1 tablet (10 mg total) by mouth nightly as needed (insomnia).     Family History       Problem Relation (Age of Onset)    Asthma Mother          Tobacco Use    Smoking status: Every Day     Current packs/day: 1.00     Average packs/day: 1 pack/day for 30.0 years (30.0 ttl pk-yrs)     Types: Cigarettes    Smokeless tobacco: Never    Tobacco comments:     DO NOT SMOKE DOS   Substance and Sexual Activity    Alcohol use: Yes     Comment: ocass    Drug use: Not Currently    Sexual activity: Not Currently     Review of Systems  Objective:     Vital Signs (Most Recent):  Temp: 97.8 °F (36.6 °C) (11/28/23 1640)  Pulse: 90 (11/28/23 2130)  Resp: 19 (11/28/23 2110)  BP: 138/74 (11/28/23 2130)  SpO2: 98 % (11/28/23 2130) Vital Signs (24h Range):  Temp:  [97.8 °F (36.6 °C)] 97.8 °F (36.6 °C)  Pulse:  [82-92] 90  Resp:  [19-28] 19  SpO2:  [97 %-100 %] 98 %  BP: (106-140)/(57-93) 138/74     Weight: 130.6 kg (288 lb)  Body mass index is 47.93 kg/m².    Physical Exam   Vitals: Reviewed  GENERAL:  Alert and oriented x 3. obese  HEENT:  EOMI. Conjunctivae intact.   NECK:  Supple   LUNGS:  No respiratory distress.  Rales bilaterally  CARDIAC:  RRR without murmur, rub or gallop  ABDOMEN:   Soft,  Nontender and nondistended, no rebound or guarding, bowel sounds present   EXTREMITIES:  Peripheral pulses are 2+. Hands and feet are warm. Good capillary refill in fingers (< 2 seconds). No clubbing, cyanosis or edema      Significant Labs: All pertinent labs within the past 24 hours have been reviewed.    Significant Imaging: I have reviewed all pertinent imaging results/findings within the past 24 hours.    Assessment/Plan:     Active Diagnoses:    Diagnosis Date Noted POA    Shortness of breath [R06.02] 11/17/2023 Yes      Problems Resolved During this Admission:     VTE Risk Mitigation (From admission, onward)           Ordered     enoxaparin injection 40 mg  Every 12 hours         11/28/23 2159     IP VTE HIGH RISK PATIENT  Once         11/28/23 2159     Place sequential compression device  Until discontinued         11/28/23 2159                    SOB/Cough/Leukocytosis/Elevated PCT  Infective vs noninfective source, ddx bacterial versus fungal infection versus hypersensitivity pneumonitis, aspiration pneumonitis  -recently discharged  -reports compliance with Levaquin doxycycline  -Bronch BAL and AFB negative  -aspergillus ag +ve, will rpt  -since procalcitonin increased from last time, we will replace those antibiotics with Zosyn  -repeat Aspergillus antigen, fungitell  -consider Hi-Res CT chest with inspiratory/expiratory phase images  -consider bronch w/ biopsy  -pulm/ID consult  -rpt Bcx/UCx    Hx Cdiff-prophylactic p.o. vanc    Hx cannabis use, opioid use  Anxiety  GERD  Type 2 diabetes mellitus without complication, without long-term current use of insulin  Hyperlipidemia  Morbid obesity  Tobacco use  -restart home meds as ordered, SSI    Lovenox prophylaxis    Andrea Melendez MD  Department of Hospital Medicine   [facility] - Emergency Dept

## 2023-11-29 NOTE — HPI
48 year old male with a PMH Cdiff, UC on Remicade, DM2, anemia, tobacco use, HLD, and anxiety.     Patient recently discharged after hospital admission for shortness breath, chest pain abdominal pain associated with CT chest that showed patchy nodular infiltrates, one of which is cavitary in the left lower lobe, suspicion for septic emboli and/or necrosis .  Patient treated with antibiotics, prophylactic p.o. vancomycin.  BAL cultures negative. KOH and AFB cultures negative, KATJA negative for IE.  Anti GBM was negative.  Patient was treated with IV Levaquin and doxycycline. PO vancomycin given for C diff prophylaxis.     Patient states he has not really gotten any better since being treated for his condition.  Some tests were pending after discharge.  His aspergillus antigen is positive at 1.16

## 2023-11-29 NOTE — ASSESSMENT & PLAN NOTE
Need to rule out necrotic pneumonitis.  Id and pulmonology consult  Continue vanco and Zosyn and antifungal

## 2023-11-29 NOTE — PHARMACY MED REC
"              .        Admission Medication History     The home medication history was taken by Yu Holloway.    You may go to "Admission" then "Reconcile Home Medications" tabs to review and/or act upon these items.     The home medication list has been updated by the Pharmacy department.   Please read ALL comments highlighted in yellow.   Please address this information as you see fit.    Feel free to contact us if you have any questions or require assistance.        Medications listed below were obtained from: Patient/family and Analytic software- [employer]  Current Facility-Administered Medications on File Prior to Encounter   Medication Dose Route Frequency Provider Last Rate Last Admin    lactated ringers infusion   Intravenous Continuous David Millna MD 75 mL/hr at 03/21/23 1252 New Bag at 03/21/23 1252     Current Outpatient Medications on File Prior to Encounter   Medication Sig Dispense Refill    busPIRone (BUSPAR) 7.5 MG tablet Take 1 tablet (7.5 mg total) by mouth 3 (three) times daily. 90 tablet 1    dicyclomine (BENTYL) 10 MG capsule Take 10 mg by mouth 3 (three) times daily as needed.      diphenhydrAMINE (BENADRYL) 25 mg capsule Take 1 capsule (25 mg total) by mouth every 6 (six) hours as needed for Itching. 30 capsule 0    doxycycline (VIBRA-TABS) 100 MG tablet Take 1 tablet (100 mg total) by mouth 2 (two) times daily. for 7 days 14 tablet 0    levoFLOXacin (LEVAQUIN) 750 MG tablet Take 1 tablet (750 mg total) by mouth once daily. for 7 days 7 tablet 0    metFORMIN (GLUCOPHAGE-XR) 500 MG ER 24hr tablet Take 1 tablet (500 mg total) by mouth 2 (two) times daily with meals. 180 tablet 1    metoprolol succinate (TOPROL-XL) 50 MG 24 hr tablet Take 1 tablet (50 mg total) by mouth once daily. 90 tablet 1    pantoprazole (PROTONIX) 40 MG tablet Take 1 tablet (40 mg total) by mouth 2 (two) times daily. 180 tablet 3    promethazine (PHENERGAN) 12.5 MG Tab Take 12.5 mg by mouth 4 (four) times daily as " needed.      semaglutide (OZEMPIC) 1 mg/dose (4 mg/3 mL) Inject 1 mg into the skin every 7 days. 3 each 1    sertraline (ZOLOFT) 100 MG tablet Take 1 tablet (100 mg total) by mouth once daily. 90 tablet 1    simvastatin (ZOCOR) 20 MG tablet Take 1 tablet (20 mg total) by mouth every evening. 90 tablet 1    vancomycin 125 mg/5 mL Soln Take 5 mLs (125 mg total) by mouth 2 (two) times a day. for 7 days 70 mL 0    varenicline (CHANTIX STARTING MONTH BOX) 0.5 mg (11)- 1 mg (42) tablet Take one 0.5mg tab by mouth once daily X3 days,then increase to one 0.5mg tab twice daily X4 days,then increase to one 1mg tab twice daily (Patient taking differently: Take 1 tablet by mouth 2 (two) times daily. Take one 0.5mg tab by mouth once daily X3 days,then increase to one 0.5mg tab twice daily X4 days,then increase to one 1mg tab twice daily) 1 each 0    zolpidem (AMBIEN) 10 mg Tab Take 1 tablet (10 mg total) by mouth nightly as needed (insomnia). 30 tablet 2    HYDROcodone-acetaminophen (NORCO) 5-325 mg per tablet Take 1 tablet by mouth every 6 (six) hours as needed for Pain. (Patient not taking: Reported on 11/29/2023) 10 tablet 0       Potential issues to be addressed PRIOR TO DISCHARGE  Patient reported not taking the following medications: (Hydrocodone). These medications remain on the home medication list. Please address accordingly.     Yu Holloway  EXT 1924

## 2023-11-29 NOTE — NURSING
Nurses Note -- 4 Eyes      11/29/2023   4:39 PM      Skin assessed during: Admit      [x] No Altered Skin Integrity Present    [x]Prevention Measures Documented      [] Yes- Altered Skin Integrity Present or Discovered   [] LDA Added if Not in Epic (Describe Wound)   [] New Altered Skin Integrity was Present on Admit and Documented in LDA   [] Wound Image Taken    Wound Care Consulted? No    Attending Nurse:  BROOKE Fine     Second RN/Staff Member:   CARMELA Hastings

## 2023-11-29 NOTE — PROGRESS NOTES
[facility] Medicine  Progress Note    Patient Name: Jacoby Jean-Baptiste  MRN: [id number]  Patient Class: IP- Inpatient   Admission Date: 11/28/2023  Length of Stay: 1 days  Attending Physician: Andrea Melendez MD  Primary Care Provider: Hunter Aguilar III, MD        Subjective:     Principal Problem:<principal problem not specified>        HPI:  48 year old male with a PMH Cdiff, UC on Remicade, DM2, anemia, tobacco use, HLD, and anxiety.     Patient recently discharged after hospital admission for shortness breath, chest pain abdominal pain associated with CT chest that showed patchy nodular infiltrates, one of which is cavitary in the left lower lobe, suspicion for septic emboli and/or necrosis .  Patient treated with antibiotics, prophylactic p.o. vancomycin.  BAL cultures negative. KOH and AFB cultures negative, KATJA negative for IE.  Anti GBM was negative.  Patient was treated with IV Levaquin and doxycycline. PO vancomycin given for C diff prophylaxis.     Patient states he has not really gotten any better since being treated for his condition.  Some tests were pending after discharge.  His aspergillus antigen is positive at 1.16    Overview/Hospital Course:  Patient admitted to the hospital for septic emboli versus necrotic pneumonitis.  Currently patient on vancomycin and antifungal.  Id and pulmonology consulted.  We will add Gram-negative coverage Zosyn IV.  Pending blood culture.      Interval History:   Patient reports somewhat feel better      Objective:     Vital Signs (Most Recent):  Temp: 98.1 °F (36.7 °C) (11/29/23 0932)  Pulse: 101 (11/29/23 0932)  Resp: 19 (11/29/23 0932)  BP: 139/68 (11/29/23 0927)  SpO2: (!) 94 % (11/29/23 0932) Vital Signs (24h Range):  Temp:  [97.8 °F (36.6 °C)-98.4 °F (36.9 °C)] 98.1 °F (36.7 °C)  Pulse:  [] 101  Resp:  [18-28] 19  SpO2:  [94 %-100 %] 94 %  BP: (106-146)/(57-93) 139/68     Weight: 130.6 kg (288 lb)  Body mass index is 47.93  kg/m².    Intake/Output Summary (Last 24 hours) at 11/29/2023 1225  Last data filed at 11/28/2023 2334  Gross per 24 hour   Intake 100 ml   Output --   Net 100 ml         Physical Exam  Constitutional:       General: He is not in acute distress.  HENT:      Head: Normocephalic and atraumatic.      Nose: No congestion.   Eyes:      General: No scleral icterus.        Right eye: No discharge.         Left eye: No discharge.      Extraocular Movements: Extraocular movements intact.   Cardiovascular:      Rate and Rhythm: Normal rate and regular rhythm.   Pulmonary:      Effort: Pulmonary effort is normal.      Breath sounds: Normal breath sounds.   Abdominal:      General: Abdomen is flat. Bowel sounds are normal.   Musculoskeletal:         General: No swelling or tenderness.      Cervical back: Normal range of motion and neck supple. No rigidity.   Skin:     General: Skin is warm.   Neurological:      General: No focal deficit present.      Mental Status: He is alert and oriented to person, place, and time.   Psychiatric:         Mood and Affect: Mood normal.             Significant Labs: All pertinent labs within the past 24 hours have been reviewed.  CBC:   Recent Labs   Lab 11/28/23  1835   WBC 17.31*   HGB 12.6*   HCT 38.3*        CMP:   Recent Labs   Lab 11/28/23  1835      K 4.3      CO2 24   GLU 91   BUN 16   CREATININE 1.4   CALCIUM 9.3   PROT 8.3   ALBUMIN 3.4*   BILITOT 0.5   ALKPHOS 60   AST 25   ALT 20   ANIONGAP 7*       Significant Imaging: I have reviewed all pertinent imaging results/findings within the past 24 hours.    Assessment/Plan:      Septic embolism  Need to rule out necrotic pneumonitis.  Id and pulmonology consult  Continue vanco and Zosyn and antifungal      History of Clostridioides difficile colitis  Continue monitor diarrhea  Patient already on vancomycin    Ulcerative colitis with acute flare  Follow up with GI      Type 2 diabetes mellitus without complication,  without long-term current use of insulin  Continue sliding scale and his home medication  Antihyperglycemics (From admission, onward)      Start     Stop Route Frequency Ordered    11/28/23 2314  insulin aspart U-100 pen 0-5 Units         -- SubQ Before meals & nightly PRN 11/28/23 2214          Hold Oral hypoglycemics while patient is in the hospital.    Tobacco use  Advised stop smoking        VTE Risk Mitigation (From admission, onward)           Ordered     enoxaparin injection 40 mg  Every 12 hours         11/28/23 2159     IP VTE HIGH RISK PATIENT  Once         11/28/23 2159     Place sequential compression device  Until discontinued         11/28/23 2159                    Discharge Planning   GERARDO: 12/1/2023     Code Status: Full Code   Is the patient medically ready for discharge?:     Reason for patient still in hospital (select all that apply): Treatment                     Mahamed Aguilar MD  Department of Hospital Medicine   [facility]

## 2023-11-29 NOTE — PROGRESS NOTES
Pharmacist Renal Dose Adjustment Note    Jacoby Jean-Baptiste is a 48 y.o. male being treated with the medication enoxaparin    Patient Data:    Vital Signs (Most Recent):  Temp: 97.8 °F (36.6 °C) (11/28/23 1640)  Pulse: 90 (11/28/23 2130)  Resp: 19 (11/28/23 2110)  BP: 138/74 (11/28/23 2130)  SpO2: 98 % (11/28/23 2130) Vital Signs (72h Range):  Temp:  [97.8 °F (36.6 °C)]   Pulse:  [82-92]   Resp:  [19-28]   BP: (106-140)/(57-93)   SpO2:  [97 %-100 %]      Recent Labs   Lab 11/22/23  0443 11/23/23  0355 11/28/23  1835   CREATININE 0.9 0.9 1.4     Serum creatinine: 1.4 mg/dL 11/28/23 1835  Estimated creatinine clearance: 80.4 mL/min    Medication: enoxaparin dose: 40 mg frequency q24h will be changed to medication: enoxaparin dose:40 mg  frequency:q12h, because his BMI > 40    Pharmacist's Name: Robson Mota  Pharmacist's Extension: [phone]

## 2023-11-29 NOTE — CARE UPDATE
11/28/23 1826   Patient Assessment/Suction   Level of Consciousness (AVPU) alert   Respiratory Effort Unlabored   Expansion/Accessory Muscles/Retractions no use of accessory muscles   All Lung Fields Breath Sounds clear;diminished   Rhythm/Pattern, Respiratory unlabored;shortness of breath   Cough Frequency infrequent   Cough Type dry   PRE-TX-O2   Device (Oxygen Therapy) room air   SpO2 100 %   Pulse Oximetry Type Continuous   Pulse 86   Resp (!) 28   Education   $ Education Other (see comment);15 min  (sats)

## 2023-11-29 NOTE — ED PROVIDER NOTES
Encounter Date: 11/28/2023       History     Chief Complaint   Patient presents with    Shortness of Breath     SOB x several weeks. Released from [facility] last for same issue. Not feeling any better.     Nausea     This is a 48-year-old male with history of ulcerative colitis, recently admitted here for generalized weakness, fever, shortness a breath, found to have multifocal cavitary pneumonia concerning for septic emboli, and admitted on IV Levaquin and doxycycline, cultures had returned negative, TTE and then he EEG negative, discharged on oral Levaquin and doxycycline which he reports compliance with.  He was returning here for worsening generalized weakness and fatigue and shortness of breath and nausea.  He says he is not getting better.  Chart review shows that his Aspergillus antigen returned positive yesterday.      The history is provided by the patient.     Review of patient's allergies indicates:  No Known Allergies  Past Medical History:   Diagnosis Date    Diabetes mellitus 01/2022    Hyperlipidemia 2015    Hypertension 2015    Insomnia 2015     Past Surgical History:   Procedure Laterality Date    BRONCHOSCOPY WITH FLUOROSCOPY Left 11/20/2023    Procedure: BRONCHOSCOPY, WITH FLUOROSCOPY;  Surgeon: Lisa Villarreal MD;  Location: [facility];  Service: Pulmonary;  Laterality: Left;    CARDIAC ELECTROPHYSIOLOGY MAPPING AND ABLATION  2017    SVT    CHOLECYSTECTOMY  2011    COLONOSCOPY N/A 5/3/2022    Procedure: COLONOSCOPY;  Surgeon: Shan Jean III, MD;  Location: [facility];  Service: Endoscopy;  Laterality: N/A;    COLONOSCOPY WITH FECAL MICROBIOTA TRANSFER N/A 3/21/2023    Procedure: COLONOSCOPY, WITH FECAL MICROBIOTA TRANSFER;  Surgeon: David Millan MD;  Location: [facility];  Service: Endoscopy;  Laterality: N/A;    ESOPHAGOGASTRODUODENOSCOPY N/A 4/24/2023    Procedure: EGD (ESOPHAGOGASTRODUODENOSCOPY);  Surgeon: Shan Jean III, MD;  Location: [facility];  Service: Endoscopy;   Laterality: N/A;    GANGLION CYST EXCISION Left 1992    UMBILICAL HERNIA REPAIR  2011    with mesh     Family History   Problem Relation Age of Onset    Asthma Mother      Social History     Tobacco Use    Smoking status: Every Day     Current packs/day: 1.00     Average packs/day: 1 pack/day for 30.0 years (30.0 ttl pk-yrs)     Types: Cigarettes    Smokeless tobacco: Never    Tobacco comments:     DO NOT SMOKE DOS   Substance Use Topics    Alcohol use: Yes     Comment: ocass    Drug use: Not Currently     Review of Systems   Constitutional:  Positive for fatigue.   Respiratory:  Positive for shortness of breath.    Gastrointestinal:  Positive for nausea.   Neurological:  Positive for weakness.   All other systems reviewed and are negative.      Physical Exam     Initial Vitals [11/28/23 1640]   BP Pulse Resp Temp SpO2   122/83 92 20 97.8 °F (36.6 °C) 97 %      MAP       --         Physical Exam    Nursing note and vitals reviewed.  Constitutional: He appears well-developed and well-nourished. He is not diaphoretic.   Lethargic, ill-appearing   HENT:   Head: Normocephalic and atraumatic.   Eyes: Conjunctivae are normal.   Neck: Neck supple.   Normal range of motion.  Cardiovascular:  Normal rate.           Pulmonary/Chest: No respiratory distress.   Abdominal: He exhibits no distension.   Musculoskeletal:         General: No edema.      Cervical back: Normal range of motion and neck supple.     Neurological: He is alert. He has normal strength.   Skin: Skin is warm and dry. No rash noted. No erythema.   Psychiatric: He has a normal mood and affect.         ED Course   Procedures  Labs Reviewed   CBC W/ AUTO DIFFERENTIAL - Abnormal; Notable for the following components:       Result Value    WBC 17.31 (*)     RBC 3.72 (*)     Hemoglobin 12.6 (*)     Hematocrit 38.3 (*)      (*)     MCH 33.9 (*)     Immature Granulocytes 1.2 (*)     Gran # (ANC) 12.7 (*)     Immature Grans (Abs) 0.20 (*)     Mono # 1.6 (*)      Eos # 0.7 (*)     Gran % 73.1 (*)     Lymph % 11.7 (*)     All other components within normal limits   COMPREHENSIVE METABOLIC PANEL - Abnormal; Notable for the following components:    Albumin 3.4 (*)     Anion Gap 7 (*)     All other components within normal limits   PROCALCITONIN - Abnormal; Notable for the following components:    Procalcitonin 5.349 (*)     All other components within normal limits   CULTURE, BLOOD   CULTURE, BLOOD   TROPONIN I HIGH SENSITIVITY   LACTIC ACID, PLASMA   B-TYPE NATRIURETIC PEPTIDE          Imaging Results              X-Ray Chest AP (Final result)  Result time 11/28/23 17:46:57      Final result by Adrian Hurtado Jr., MD (11/28/23 17:46:57)                   Narrative:    HISTORY: SOB    COMPARISON:Comparison made with patient's previous imaging study of 11/23/2023.    FINDINGS:Aggregate interstitial markings are established bases hilar/infrahilar region projecting into the left lower lobe with marked improvement upon comparison. Resolution of interstitial markings elsewhere throughout the bilateral lungs. There is no evidence of pneumothorax. The mediastinal and hilar contours are well-maintained. The heart is normal in size. No significant pleural effusion. The osseous structures show nothing unusual.    IMPRESSION:Improvement upon comparison with resolving interstitial and left lower lobe nodular alveolar opacities with nominal residual.    Electronically signed by:  Adrian Hurtado MD  11/28/2023 05:46 PM CST Workstation: [id number]                                     Medications   piperacillin-tazobactam 3.375 g in dextrose 5 % 100 mL IVPB (ready to mix) (3.375 g Intravenous New Bag 11/28/23 2129)   sodium chloride 0.9% bolus 1,000 mL 1,000 mL (0 mLs Intravenous Stopped 11/28/23 1930)     Medical Decision Making  Patient has worsening leukocytosis of 17.  Procalcitonin is also worsening, now 5.3.  Lactic acid is normal and he is hemodynamically stable.  CXR shows  improving pulmonary infiltrates.  I discussed with hospitalist who agrees to the admission.  Requests IV Zosyn and obtaining CT which I have ordered.    Amount and/or Complexity of Data Reviewed  Labs: ordered.  Radiology: ordered.    Risk  Decision regarding hospitalization.                                      Clinical Impression:  Final diagnoses:  [R06.02] SOB (shortness of breath)  [J18.9] Pneumonia due to infectious organism, unspecified laterality, unspecified part of lung (Primary)          ED Disposition Condition    Admit Stable                [place]o cervantes MD  11/28/23 3165

## 2023-11-29 NOTE — PLAN OF CARE
[facility]  Initial Discharge Assessment      Met with pt who was sitting up in chair working on his computer. Discussed discharge planning and verified demographics.  Pt reports that he drove himself to the hospital when he did not feel that his medical condition was improving. He reports being independent in functioning and denies current discharge needs.  Plans to discharge home with outpt f/u. He had a f/u with PCP/ID approaching and has cancelled these appointments since admitted. Will continue to follow.     Primary Care Provider: Hunter Aguilar III, MD    Admission Diagnosis: Pneumonia due to infectious organism, unspecified laterality, unspecified part of lung [J18.9]    Admission Date: 11/28/2023  Expected Discharge Date: 12/1/2023    Transition of Care Barriers: None    Payor: [employer] / Plan: [employer] ([employer]) / Product Type: Commercial /     Extended Emergency Contact Information  Primary Emergency Contact: Andrea Jean-Baptiste  Address: [address] [place] [address] [facility]  Home Phone: [phone]  Mobile Phone: [phone]  Relation: Sister  Secondary Emergency Contact: Dallas Jean-Baptiste   [place]  Mobile Phone: [phone]  Relation: Brother  Preferred language: English   needed? No              [id number] [address]  AT [facility]  [address] [facility] [address]  Phone: [phone] Fax: [phone]    [id number] - [place], [place] - [address] AT [facility] [address]  [address]  [facility] [address]  Phone: [phone] Fax: [phone]    [facility] - [place], [place] - [address]  [address]  [facility] [address]  Phone: [phone] Fax: [phone]      Initial Assessment (most recent)       Adult Discharge Assessment - 11/29/23 1553          Discharge Assessment     Assessment Type Discharge Planning Assessment     Confirmed/corrected address, phone number and insurance Yes     Confirmed Demographics Correct on Facesheet     Source of Information patient     Communicated GERARDO with patient/caregiver Date not available/Unable to determine     People in Home alone     Do you expect to return to your current living situation? Yes     Do you have help at home or someone to help you manage your care at home? No     Prior to hospitilization cognitive status: Alert/Oriented     Current cognitive status: Alert/Oriented     Home Accessibility other (see comments)   Lives in travel trailer with 3 steps that he navigates without difficulty    Equipment Currently Used at Home none     Readmission within 30 days? Yes     Patient currently being followed by outpatient case management? No     Do you currently have service(s) that help you manage your care at home? No     Do you take prescription medications? Yes     Do you have prescription coverage? Yes     Do you have any problems affording any of your prescribed medications? No     Is the patient taking medications as prescribed? yes     Who is going to help you get home at discharge? States that he drove himself to hospital     How do you get to doctors appointments? car, drives self     Are you on dialysis? No     Do you take coumadin? No     DME Needed Upon Discharge  none     Discharge Plan discussed with: Patient     Transition of Care Barriers None     Discharge Plan A Home     Discharge Plan B Home                     Readmission Assessment (most recent)       Readmission Assessment - 11/29/23 1557          Readmission    Why were you hospitalized in the last 30 days? Septic embolism     Why were you readmitted? Alarmed about signs/symptoms;Related to previous admission     When you left the hospital where did you go? Home Alone     Did patient/caregiver refused recommended DC plan? No     Did you try to manage your symptoms your  self? No     Did you call anyone? No     Did you try to see or did see a doctor or nurse before you came? No     Why? Had appointment on 30th-cancelled when came to hospital     Did you have  a follow-up appointment on discharge? Yes     Did you go? No     Why? --   Not until 30th    Was this a planned readmission? No

## 2023-11-29 NOTE — ED NOTES
Informed of plan of care, room assignment received, tech will take him to his room, pt verbalized understanding    Nutrition Education    Nutrition Problem: Knowledge and BeliefsFood and nutrition related knowledge deficit    Related to: Lack of or limited prior nutrition-related education   As evidenced by: No prior education provided on how to apply food and nutrition related information   Primary Nutrition Diagnosis status: New nutrition diagnosis  Nutrition Education: Heart Failure Therapy  Goal: Express comprehension of nutrition intervention   Intervention goal status: Goal achieved   Time Frame to Achieve Goal: Met   Dietitian will monitor: Food and knowedge or skill     Joaquina Dennis  Dietetic Intern

## 2023-11-29 NOTE — PLAN OF CARE
Problem: Adult Inpatient Plan of Care  Goal: Plan of Care Review  11/29/2023 1640 by Rody Dubois RN  Outcome: Ongoing, Progressing  11/29/2023 1633 by Rody Dubois RN  Outcome: Ongoing, Progressing  Goal: Patient-Specific Goal (Individualized)  11/29/2023 1640 by Rody Dubois RN  Outcome: Ongoing, Progressing  11/29/2023 1633 by Rody Dubois RN  Outcome: Ongoing, Progressing  Goal: Absence of Hospital-Acquired Illness or Injury  11/29/2023 1640 by Rody Dubois RN  Outcome: Ongoing, Progressing  11/29/2023 1633 by Rody Dubois RN  Outcome: Ongoing, Progressing  Goal: Optimal Comfort and Wellbeing  11/29/2023 1640 by Rody Dubois RN  Outcome: Ongoing, Progressing  11/29/2023 1633 by Rody Dubois RN  Outcome: Ongoing, Progressing  Goal: Readiness for Transition of Care  11/29/2023 1640 by Rody Dubois RN  Outcome: Ongoing, Progressing  11/29/2023 1633 by Rody Dubois RN  Outcome: Ongoing, Progressing     Problem: Bariatric Environmental Safety  Goal: Safety Maintained with Care  11/29/2023 1640 by Rody Dubois RN  Outcome: Ongoing, Progressing  11/29/2023 1633 by Rody Dubois RN  Outcome: Ongoing, Progressing     Problem: Diabetes Comorbidity  Goal: Blood Glucose Level Within Targeted Range  11/29/2023 1640 by Rody Dubois RN  Outcome: Ongoing, Progressing  11/29/2023 1633 by Rody Dubois RN  Outcome: Ongoing, Progressing     Problem: Infection  Goal: Absence of Infection Signs and Symptoms  11/29/2023 1640 by Rody Dubois RN  Outcome: Ongoing, Progressing  11/29/2023 1633 by Rody Dubois RN  Outcome: Ongoing, Progressing

## 2023-11-29 NOTE — ED NOTES
Pt offered sandwich tray to give him his COLESERVELAM THIS AM, Pt refusing sandwich tray, requesting breakfast tray

## 2023-11-29 NOTE — ASSESSMENT & PLAN NOTE
Continue sliding scale and his home medication  Antihyperglycemics (From admission, onward)      Start     Stop Route Frequency Ordered    11/28/23 2314  insulin aspart U-100 pen 0-5 Units         -- SubQ Before meals & nightly PRN 11/28/23 2214          Hold Oral hypoglycemics while patient is in the hospital.

## 2023-11-29 NOTE — SUBJECTIVE & OBJECTIVE
Interval History:   Patient reports somewhat feel better      Objective:     Vital Signs (Most Recent):  Temp: 98.1 °F (36.7 °C) (11/29/23 0932)  Pulse: 101 (11/29/23 0932)  Resp: 19 (11/29/23 0932)  BP: 139/68 (11/29/23 0927)  SpO2: (!) 94 % (11/29/23 0932) Vital Signs (24h Range):  Temp:  [97.8 °F (36.6 °C)-98.4 °F (36.9 °C)] 98.1 °F (36.7 °C)  Pulse:  [] 101  Resp:  [18-28] 19  SpO2:  [94 %-100 %] 94 %  BP: (106-146)/(57-93) 139/68     Weight: 130.6 kg (288 lb)  Body mass index is 47.93 kg/m².    Intake/Output Summary (Last 24 hours) at 11/29/2023 1225  Last data filed at 11/28/2023 2334  Gross per 24 hour   Intake 100 ml   Output --   Net 100 ml         Physical Exam  Constitutional:       General: He is not in acute distress.  HENT:      Head: Normocephalic and atraumatic.      Nose: No congestion.   Eyes:      General: No scleral icterus.        Right eye: No discharge.         Left eye: No discharge.      Extraocular Movements: Extraocular movements intact.   Cardiovascular:      Rate and Rhythm: Normal rate and regular rhythm.   Pulmonary:      Effort: Pulmonary effort is normal.      Breath sounds: Normal breath sounds.   Abdominal:      General: Abdomen is flat. Bowel sounds are normal.   Musculoskeletal:         General: No swelling or tenderness.      Cervical back: Normal range of motion and neck supple. No rigidity.   Skin:     General: Skin is warm.   Neurological:      General: No focal deficit present.      Mental Status: He is alert and oriented to person, place, and time.   Psychiatric:         Mood and Affect: Mood normal.             Significant Labs: All pertinent labs within the past 24 hours have been reviewed.  CBC:   Recent Labs   Lab 11/28/23 1835   WBC 17.31*   HGB 12.6*   HCT 38.3*        CMP:   Recent Labs   Lab 11/28/23  1835      K 4.3      CO2 24   GLU 91   BUN 16   CREATININE 1.4   CALCIUM 9.3   PROT 8.3   ALBUMIN 3.4*   BILITOT 0.5   ALKPHOS 60   AST 25    ALT 20   ANIONGAP 7*       Significant Imaging: I have reviewed all pertinent imaging results/findings within the past 24 hours.

## 2023-11-30 ENCOUNTER — ANESTHESIA (OUTPATIENT)
Dept: SURGERY | Facility: HOSPITAL | Age: 48
DRG: 166 | End: 2023-11-30
Payer: COMMERCIAL

## 2023-11-30 ENCOUNTER — ANESTHESIA EVENT (OUTPATIENT)
Dept: SURGERY | Facility: HOSPITAL | Age: 48
DRG: 166 | End: 2023-11-30
Payer: COMMERCIAL

## 2023-11-30 PROBLEM — J98.4 CAVITARY PNEUMONIA: Status: ACTIVE | Noted: 2023-11-30

## 2023-11-30 PROBLEM — J18.9 CAVITARY PNEUMONIA: Status: ACTIVE | Noted: 2023-11-30

## 2023-11-30 PROBLEM — Z87.891 EX-SMOKER: Status: ACTIVE | Noted: 2023-11-30

## 2023-11-30 PROBLEM — R16.2 HEPATOSPLENOMEGALY: Status: ACTIVE | Noted: 2023-11-30

## 2023-11-30 PROBLEM — J95.811 PNEUMOTHORAX AFTER BIOPSY: Status: ACTIVE | Noted: 2023-11-30

## 2023-11-30 PROBLEM — K51.90 ULCERATIVE COLITIS: Chronic | Status: ACTIVE | Noted: 2023-04-20

## 2023-11-30 LAB
ANION GAP SERPL CALC-SCNC: 7 MMOL/L (ref 8–16)
BASOPHILS # BLD AUTO: 0.1 K/UL (ref 0–0.2)
BASOPHILS NFR BLD: 0.7 % (ref 0–1.9)
BUN SERPL-MCNC: 19 MG/DL (ref 6–20)
CALCIUM SERPL-MCNC: 9.1 MG/DL (ref 8.7–10.5)
CHLORIDE SERPL-SCNC: 104 MMOL/L (ref 95–110)
CO2 SERPL-SCNC: 25 MMOL/L (ref 23–29)
CREAT SERPL-MCNC: 1.4 MG/DL (ref 0.5–1.4)
CRP SERPL-MCNC: 61.6 MG/L (ref 0–8.2)
DIFFERENTIAL METHOD: ABNORMAL
EOSINOPHIL # BLD AUTO: 0.6 K/UL (ref 0–0.5)
EOSINOPHIL NFR BLD: 3.8 % (ref 0–8)
ERYTHROCYTE [DISTWIDTH] IN BLOOD BY AUTOMATED COUNT: 13.8 % (ref 11.5–14.5)
EST. GFR  (NO RACE VARIABLE): >60 ML/MIN/1.73 M^2
GLUCOSE SERPL-MCNC: 105 MG/DL (ref 70–110)
GLUCOSE SERPL-MCNC: 106 MG/DL (ref 70–110)
GLUCOSE SERPL-MCNC: 122 MG/DL (ref 70–110)
GLUCOSE SERPL-MCNC: 84 MG/DL (ref 70–110)
GLUCOSE SERPL-MCNC: 92 MG/DL (ref 70–110)
GLUCOSE SERPL-MCNC: 95 MG/DL (ref 70–110)
HCT VFR BLD AUTO: 35.9 % (ref 40–54)
HGB BLD-MCNC: 11.7 G/DL (ref 14–18)
IMM GRANULOCYTES # BLD AUTO: 0.19 K/UL (ref 0–0.04)
IMM GRANULOCYTES NFR BLD AUTO: 1.3 % (ref 0–0.5)
KOH PREP SPEC: NORMAL
LYMPHOCYTES # BLD AUTO: 2.1 K/UL (ref 1–4.8)
LYMPHOCYTES NFR BLD: 13.6 % (ref 18–48)
MCH RBC QN AUTO: 33.6 PG (ref 27–31)
MCHC RBC AUTO-ENTMCNC: 32.6 G/DL (ref 32–36)
MCV RBC AUTO: 103 FL (ref 82–98)
MONOCYTES # BLD AUTO: 1.5 K/UL (ref 0.3–1)
MONOCYTES NFR BLD: 10.1 % (ref 4–15)
NEUTROPHILS # BLD AUTO: 10.7 K/UL (ref 1.8–7.7)
NEUTROPHILS NFR BLD: 70.5 % (ref 38–73)
NRBC BLD-RTO: 0 /100 WBC
PLATELET # BLD AUTO: 353 K/UL (ref 150–450)
PMV BLD AUTO: 9.9 FL (ref 9.2–12.9)
POTASSIUM SERPL-SCNC: 4.2 MMOL/L (ref 3.5–5.1)
PROCALCITONIN SERPL IA-MCNC: 1.88 NG/ML (ref 0–0.5)
RBC # BLD AUTO: 3.48 M/UL (ref 4.6–6.2)
SODIUM SERPL-SCNC: 136 MMOL/L (ref 136–145)
WBC # BLD AUTO: 15.2 K/UL (ref 3.9–12.7)

## 2023-11-30 PROCEDURE — 36415 COLL VENOUS BLD VENIPUNCTURE: CPT | Performed by: STUDENT IN AN ORGANIZED HEALTH CARE EDUCATION/TRAINING PROGRAM

## 2023-11-30 PROCEDURE — 31624 DX BRONCHOSCOPE/LAVAGE: CPT | Performed by: INTERNAL MEDICINE

## 2023-11-30 PROCEDURE — 87206 SMEAR FLUORESCENT/ACID STAI: CPT | Performed by: INTERNAL MEDICINE

## 2023-11-30 PROCEDURE — 87070 CULTURE OTHR SPECIMN AEROBIC: CPT | Performed by: INTERNAL MEDICINE

## 2023-11-30 PROCEDURE — 99233 PR SUBSEQUENT HOSPITAL CARE,LEVL III: ICD-10-PCS | Mod: ,,, | Performed by: STUDENT IN AN ORGANIZED HEALTH CARE EDUCATION/TRAINING PROGRAM

## 2023-11-30 PROCEDURE — 27201114 HC TRAP (ANY): Performed by: INTERNAL MEDICINE

## 2023-11-30 PROCEDURE — 63600175 PHARM REV CODE 636 W HCPCS: Performed by: ANESTHESIOLOGY

## 2023-11-30 PROCEDURE — 31624 DX BRONCHOSCOPE/LAVAGE: CPT | Mod: 51,,, | Performed by: INTERNAL MEDICINE

## 2023-11-30 PROCEDURE — 31628 BRONCHOSCOPY/LUNG BX EACH: CPT | Performed by: INTERNAL MEDICINE

## 2023-11-30 PROCEDURE — 25000003 PHARM REV CODE 250: Performed by: ANESTHESIOLOGY

## 2023-11-30 PROCEDURE — 84145 PROCALCITONIN (PCT): CPT | Performed by: STUDENT IN AN ORGANIZED HEALTH CARE EDUCATION/TRAINING PROGRAM

## 2023-11-30 PROCEDURE — D9220A PRA ANESTHESIA: Mod: ANES,,, | Performed by: ANESTHESIOLOGY

## 2023-11-30 PROCEDURE — 85025 COMPLETE CBC W/AUTO DIFF WBC: CPT | Performed by: INTERNAL MEDICINE

## 2023-11-30 PROCEDURE — 31628 PR BRONCHOSCOPY,TRANSBRONCH BIOPSY: ICD-10-PCS | Mod: LT,,, | Performed by: INTERNAL MEDICINE

## 2023-11-30 PROCEDURE — 25000003 PHARM REV CODE 250: Performed by: STUDENT IN AN ORGANIZED HEALTH CARE EDUCATION/TRAINING PROGRAM

## 2023-11-30 PROCEDURE — 99232 SBSQ HOSP IP/OBS MODERATE 35: CPT | Mod: 25,,, | Performed by: INTERNAL MEDICINE

## 2023-11-30 PROCEDURE — 31628 BRONCHOSCOPY/LUNG BX EACH: CPT | Mod: LT,,, | Performed by: INTERNAL MEDICINE

## 2023-11-30 PROCEDURE — 63600175 PHARM REV CODE 636 W HCPCS: Performed by: NURSE ANESTHETIST, CERTIFIED REGISTERED

## 2023-11-30 PROCEDURE — 99232 PR SUBSEQUENT HOSPITAL CARE,LEVL II: ICD-10-PCS | Mod: 25,,, | Performed by: INTERNAL MEDICINE

## 2023-11-30 PROCEDURE — 87205 SMEAR GRAM STAIN: CPT | Performed by: INTERNAL MEDICINE

## 2023-11-30 PROCEDURE — D9220A PRA ANESTHESIA: ICD-10-PCS | Mod: CRNA,,, | Performed by: NURSE ANESTHETIST, CERTIFIED REGISTERED

## 2023-11-30 PROCEDURE — D9220A PRA ANESTHESIA: ICD-10-PCS | Mod: ANES,,, | Performed by: ANESTHESIOLOGY

## 2023-11-30 PROCEDURE — 87116 MYCOBACTERIA CULTURE: CPT | Performed by: INTERNAL MEDICINE

## 2023-11-30 PROCEDURE — 86140 C-REACTIVE PROTEIN: CPT | Performed by: STUDENT IN AN ORGANIZED HEALTH CARE EDUCATION/TRAINING PROGRAM

## 2023-11-30 PROCEDURE — 25000003 PHARM REV CODE 250: Performed by: INTERNAL MEDICINE

## 2023-11-30 PROCEDURE — 12000002 HC ACUTE/MED SURGE SEMI-PRIVATE ROOM

## 2023-11-30 PROCEDURE — 31624 PR BRONCHOSCOPY,DIAG2STIC W LAVAGE: ICD-10-PCS | Mod: 51,,, | Performed by: INTERNAL MEDICINE

## 2023-11-30 PROCEDURE — 27200946 HC BRUSH, CYTOLOGY: Performed by: INTERNAL MEDICINE

## 2023-11-30 PROCEDURE — D9220A PRA ANESTHESIA: Mod: CRNA,,, | Performed by: NURSE ANESTHETIST, CERTIFIED REGISTERED

## 2023-11-30 PROCEDURE — 82962 GLUCOSE BLOOD TEST: CPT

## 2023-11-30 PROCEDURE — 63600175 PHARM REV CODE 636 W HCPCS: Performed by: STUDENT IN AN ORGANIZED HEALTH CARE EDUCATION/TRAINING PROGRAM

## 2023-11-30 PROCEDURE — 36415 COLL VENOUS BLD VENIPUNCTURE: CPT | Performed by: INTERNAL MEDICINE

## 2023-11-30 PROCEDURE — 25000003 PHARM REV CODE 250: Performed by: NURSE ANESTHETIST, CERTIFIED REGISTERED

## 2023-11-30 PROCEDURE — 31623 DX BRONCHOSCOPE/BRUSH: CPT | Performed by: INTERNAL MEDICINE

## 2023-11-30 PROCEDURE — 27000679 HC ADAPTOR, BRONCHOSCOPE: Performed by: INTERNAL MEDICINE

## 2023-11-30 PROCEDURE — 87305 ASPERGILLUS AG IA: CPT | Performed by: INTERNAL MEDICINE

## 2023-11-30 PROCEDURE — 25000242 PHARM REV CODE 250 ALT 637 W/ HCPCS: Performed by: INTERNAL MEDICINE

## 2023-11-30 PROCEDURE — 99233 SBSQ HOSP IP/OBS HIGH 50: CPT | Mod: ,,, | Performed by: STUDENT IN AN ORGANIZED HEALTH CARE EDUCATION/TRAINING PROGRAM

## 2023-11-30 PROCEDURE — 99900031 HC PATIENT EDUCATION (STAT)

## 2023-11-30 PROCEDURE — 37000008 HC ANESTHESIA 1ST 15 MINUTES: Performed by: INTERNAL MEDICINE

## 2023-11-30 PROCEDURE — 80048 BASIC METABOLIC PNL TOTAL CA: CPT | Performed by: INTERNAL MEDICINE

## 2023-11-30 PROCEDURE — 94760 N-INVAS EAR/PLS OXIMETRY 1: CPT

## 2023-11-30 PROCEDURE — 31623 DX BRONCHOSCOPE/BRUSH: CPT | Mod: 51,,, | Performed by: INTERNAL MEDICINE

## 2023-11-30 PROCEDURE — 27200944 HC BRONCH FORCEPS DISPOSABLE: Performed by: INTERNAL MEDICINE

## 2023-11-30 PROCEDURE — 31623 PR BRONCHOSCOPY,DIAGNOSTIC W BRUSH: ICD-10-PCS | Mod: 51,,, | Performed by: INTERNAL MEDICINE

## 2023-11-30 PROCEDURE — 87102 FUNGUS ISOLATION CULTURE: CPT | Mod: 59 | Performed by: INTERNAL MEDICINE

## 2023-11-30 PROCEDURE — 37000009 HC ANESTHESIA EA ADD 15 MINS: Performed by: INTERNAL MEDICINE

## 2023-11-30 PROCEDURE — 87210 SMEAR WET MOUNT SALINE/INK: CPT | Mod: 91 | Performed by: INTERNAL MEDICINE

## 2023-11-30 RX ORDER — LIDOCAINE HYDROCHLORIDE 40 MG/ML
INJECTION, SOLUTION RETROBULBAR
Status: DISCONTINUED | OUTPATIENT
Start: 2023-11-30 | End: 2023-11-30 | Stop reason: HOSPADM

## 2023-11-30 RX ORDER — ALBUTEROL SULFATE 0.83 MG/ML
SOLUTION RESPIRATORY (INHALATION)
Status: DISCONTINUED | OUTPATIENT
Start: 2023-11-30 | End: 2023-11-30 | Stop reason: HOSPADM

## 2023-11-30 RX ORDER — NALOXONE HCL 0.4 MG/ML
0.02 VIAL (ML) INJECTION ONCE
Status: DISCONTINUED | OUTPATIENT
Start: 2023-11-30 | End: 2023-12-04 | Stop reason: HOSPADM

## 2023-11-30 RX ORDER — LIDOCAINE HYDROCHLORIDE 20 MG/ML
INJECTION, SOLUTION EPIDURAL; INFILTRATION; INTRACAUDAL; PERINEURAL
Status: DISCONTINUED | OUTPATIENT
Start: 2023-11-30 | End: 2023-11-30

## 2023-11-30 RX ORDER — DIPHENHYDRAMINE HYDROCHLORIDE 50 MG/ML
6.25 INJECTION INTRAMUSCULAR; INTRAVENOUS ONCE AS NEEDED
Status: DISCONTINUED | OUTPATIENT
Start: 2023-11-30 | End: 2023-12-04 | Stop reason: HOSPADM

## 2023-11-30 RX ORDER — ONDANSETRON 2 MG/ML
4 INJECTION INTRAMUSCULAR; INTRAVENOUS DAILY PRN
Status: DISCONTINUED | OUTPATIENT
Start: 2023-11-30 | End: 2023-12-04 | Stop reason: HOSPADM

## 2023-11-30 RX ORDER — MIDAZOLAM HYDROCHLORIDE 1 MG/ML
INJECTION INTRAMUSCULAR; INTRAVENOUS
Status: DISCONTINUED | OUTPATIENT
Start: 2023-11-30 | End: 2023-11-30

## 2023-11-30 RX ORDER — PROPOFOL 10 MG/ML
VIAL (ML) INTRAVENOUS
Status: DISCONTINUED | OUTPATIENT
Start: 2023-11-30 | End: 2023-11-30

## 2023-11-30 RX ORDER — SUCCINYLCHOLINE CHLORIDE 20 MG/ML
INJECTION INTRAMUSCULAR; INTRAVENOUS
Status: DISCONTINUED | OUTPATIENT
Start: 2023-11-30 | End: 2023-11-30

## 2023-11-30 RX ORDER — ACETAMINOPHEN 325 MG/1
650 TABLET ORAL EVERY 6 HOURS PRN
Status: DISCONTINUED | OUTPATIENT
Start: 2023-11-30 | End: 2023-12-04 | Stop reason: HOSPADM

## 2023-11-30 RX ORDER — OXYCODONE HYDROCHLORIDE 5 MG/1
5 TABLET ORAL
Status: DISCONTINUED | OUTPATIENT
Start: 2023-11-30 | End: 2023-12-02

## 2023-11-30 RX ORDER — ONDANSETRON 2 MG/ML
INJECTION INTRAMUSCULAR; INTRAVENOUS
Status: DISCONTINUED | OUTPATIENT
Start: 2023-11-30 | End: 2023-11-30

## 2023-11-30 RX ORDER — FENTANYL CITRATE 50 UG/ML
25 INJECTION, SOLUTION INTRAMUSCULAR; INTRAVENOUS EVERY 5 MIN PRN
Status: DISCONTINUED | OUTPATIENT
Start: 2023-11-30 | End: 2023-12-04 | Stop reason: HOSPADM

## 2023-11-30 RX ADMIN — DIPHENHYDRAMINE HYDROCHLORIDE 25 MG: 25 CAPSULE ORAL at 01:11

## 2023-11-30 RX ADMIN — PROPOFOL 30 MG: 10 INJECTION, EMULSION INTRAVENOUS at 10:11

## 2023-11-30 RX ADMIN — VORICONAZOLE 200 MG: 200 TABLET, FILM COATED ORAL at 01:11

## 2023-11-30 RX ADMIN — PIPERACILLIN AND TAZOBACTAM 4.5 G: 4; .5 INJECTION, POWDER, LYOPHILIZED, FOR SOLUTION INTRAVENOUS at 09:11

## 2023-11-30 RX ADMIN — PANTOPRAZOLE SODIUM 40 MG: 40 TABLET, DELAYED RELEASE ORAL at 01:11

## 2023-11-30 RX ADMIN — COLESEVELAM HYDROCHLORIDE 1875 MG: 625 TABLET, COATED ORAL at 09:11

## 2023-11-30 RX ADMIN — SERTRALINE HYDROCHLORIDE 100 MG: 50 TABLET ORAL at 01:11

## 2023-11-30 RX ADMIN — PROPOFOL 30 MG: 10 INJECTION, EMULSION INTRAVENOUS at 09:11

## 2023-11-30 RX ADMIN — METOPROLOL SUCCINATE 50 MG: 50 TABLET, EXTENDED RELEASE ORAL at 01:11

## 2023-11-30 RX ADMIN — VANCOMYCIN HYDROCHLORIDE 125 MG: KIT at 09:11

## 2023-11-30 RX ADMIN — COLESEVELAM HYDROCHLORIDE 1875 MG: 625 TABLET, COATED ORAL at 01:11

## 2023-11-30 RX ADMIN — CHLORHEXIDINE GLUCONATE 15 ML: 1.2 RINSE ORAL at 08:11

## 2023-11-30 RX ADMIN — Medication 120 MG: at 09:11

## 2023-11-30 RX ADMIN — OXYCODONE HYDROCHLORIDE 5 MG: 5 TABLET ORAL at 02:11

## 2023-11-30 RX ADMIN — OXYCODONE HYDROCHLORIDE 5 MG: 5 TABLET ORAL at 06:11

## 2023-11-30 RX ADMIN — ONDANSETRON 4 MG: 2 INJECTION INTRAMUSCULAR; INTRAVENOUS at 10:11

## 2023-11-30 RX ADMIN — OXYCODONE HYDROCHLORIDE 5 MG: 5 TABLET ORAL at 10:11

## 2023-11-30 RX ADMIN — PROPOFOL 150 MG: 10 INJECTION, EMULSION INTRAVENOUS at 09:11

## 2023-11-30 RX ADMIN — ZOLPIDEM TARTRATE 10 MG: 5 TABLET, COATED ORAL at 08:11

## 2023-11-30 RX ADMIN — VORICONAZOLE 200 MG: 200 TABLET, FILM COATED ORAL at 08:11

## 2023-11-30 RX ADMIN — PANTOPRAZOLE SODIUM 40 MG: 40 TABLET, DELAYED RELEASE ORAL at 08:11

## 2023-11-30 RX ADMIN — FENTANYL CITRATE 25 MCG: 50 INJECTION, SOLUTION INTRAMUSCULAR; INTRAVENOUS at 11:11

## 2023-11-30 RX ADMIN — DICYCLOMINE HYDROCHLORIDE 10 MG: 10 CAPSULE ORAL at 01:11

## 2023-11-30 RX ADMIN — VANCOMYCIN HYDROCHLORIDE 125 MG: KIT at 01:11

## 2023-11-30 RX ADMIN — BUSPIRONE HYDROCHLORIDE 7.5 MG: 5 TABLET ORAL at 08:11

## 2023-11-30 RX ADMIN — CHLORHEXIDINE GLUCONATE 15 ML: 1.2 RINSE ORAL at 01:11

## 2023-11-30 RX ADMIN — DIPHENHYDRAMINE HYDROCHLORIDE 25 MG: 25 CAPSULE ORAL at 08:11

## 2023-11-30 RX ADMIN — MIDAZOLAM HYDROCHLORIDE 2 MG: 1 INJECTION, SOLUTION INTRAMUSCULAR; INTRAVENOUS at 09:11

## 2023-11-30 RX ADMIN — BUSPIRONE HYDROCHLORIDE 7.5 MG: 5 TABLET ORAL at 01:11

## 2023-11-30 RX ADMIN — SODIUM CHLORIDE: 900 INJECTION, SOLUTION INTRAVENOUS at 09:11

## 2023-11-30 RX ADMIN — OXYCODONE AND ACETAMINOPHEN 1 TABLET: 5; 325 TABLET ORAL at 09:11

## 2023-11-30 RX ADMIN — PROPOFOL 70 MG: 10 INJECTION, EMULSION INTRAVENOUS at 09:11

## 2023-11-30 RX ADMIN — DICYCLOMINE HYDROCHLORIDE 10 MG: 10 CAPSULE ORAL at 09:11

## 2023-11-30 RX ADMIN — LIDOCAINE HYDROCHLORIDE 6 MG: 20 INJECTION, SOLUTION INTRAVENOUS at 09:11

## 2023-11-30 RX ADMIN — PIPERACILLIN AND TAZOBACTAM 4.5 G: 4; .5 INJECTION, POWDER, LYOPHILIZED, FOR SOLUTION INTRAVENOUS at 05:11

## 2023-11-30 RX ADMIN — PIPERACILLIN AND TAZOBACTAM 4.5 G: 4; .5 INJECTION, POWDER, LYOPHILIZED, FOR SOLUTION INTRAVENOUS at 02:11

## 2023-11-30 NOTE — PROGRESS NOTES
Pulmonary/Critical Care Consult      PATIENT NAME: Jacoby Jean-Baptiste  MRN: [id number]  TODAY'S DATE: 2023  12:55 PM  ADMIT DATE: 2023  AGE: 48 y.o. : 1975    CONSULT REQUESTED BY: Moon Armas MD    REASON FOR CONSULT:   Cavitary pneumonia, progressive    HPI:  The patient is a 48-year-old male who returned to the emergency room because he was not feeling any better.  He had been hospitalized earlier in the month with a multilobar nodular infiltrate with the left lower lobe nodule cavitating.  He underwent bronchoscopy which was unrevealing.  His blood cultures were negative.  In the interim since he was discharged, he has not felt any better despite completing his antibiotics.  Not clearing any mucus  He is not running fever.  He has an Aspergillus antigen that is positive at 1.16.  The patient has been immunosuppressed with Remicade for his ulcerative colitis.  He has not had that in weeks now.  His CT on presentation is significantly worse this time than last time with a left-sided predominance.      the patient underwent bronchoscopy this morning.  Unfortunately, he has a small apical pneumothorax.  He is currently on 2 L of oxygen.  Good specimens were obtained.  The patient remains quite stable.    REVIEW OF SYSTEMS  GENERAL: Feeling poorly  EYES: Vision is good.  ENT: No sinusitis or pharyngitis.   HEART: No chest pain or palpitations.  LUNGS:  He coughs but does not clear any mucus  GI: No Nausea, vomiting, constipation, diarrhea, or reflux.  : No dysuria, hesitancy, or nocturia.  SKIN: No lesions or rashes.  MUSCULOSKELETAL: No joint pain or myalgias.  NEURO: No headaches or neuropathy.  LYMPH: No edema or adenopathy.  PSYCH: No anxiety or depression.  ENDO: No weight change.    No change in the patient's Past Medical History, Past Surgical History, Social History or Family History since admission.      VITAL SIGNS (MOST RECENT)  Temp: 97.7 °F (36.5 °C) (23 1213)  Pulse:  90 (11/30/23 1213)  Resp: 20 (11/30/23 1213)  BP: (!) 120/56 (11/30/23 1213)  SpO2: 96 % (11/30/23 1213)    INTAKE AND OUTPUT (LAST 24 HOURS):  Intake/Output Summary (Last 24 hours) at 11/30/2023 1300  Last data filed at 11/30/2023 1159  Gross per 24 hour   Intake 1010 ml   Output --   Net 1010 ml       WEIGHT  Wt Readings from Last 1 Encounters:   11/30/23 129.3 kg (285 lb)       PHYSICAL EXAM  GENERAL:  Mid aged patient in no distress.  Sitting in the chair.  HEENT: Pupils equal and reactive. Extraocular movements intact. Nose intact. Pharynx moist.  NECK: Supple.   HEART: Regular rate and rhythm. No murmur or gallop auscultated.  LUNGS:  The patient has funny noises in his left lower lobe.  It sounds somewhat like a rub and some what like stomach noises..  There are few crackles now as well.  However, he just had a bronchoscopy.  Lung excursion symmetrical. No change in fremitus. No adventitial noises.  ABDOMEN: Bowel sounds present. Non-tender, no masses palpated.  : Normal anatomy.  EXTREMITIES: Normal muscle tone and joint movement, no cyanosis or clubbing.   LYMPHATICS: No adenopathy palpated, no edema.  SKIN: Dry, intact, no lesions.   NEURO: Cranial nerves II-XII intact. Motor strength 5/5 bilaterally, upper and lower extremities.  PSYCH: Appropriate affect      CBC LAST (LAST 24 HOURS)  Recent Labs   Lab 11/30/23  0421   WBC 15.20*   RBC 3.48*   HGB 11.7*   HCT 35.9*   *   MCH 33.6*   MCHC 32.6   RDW 13.8      MPV 9.9   GRAN 70.5  10.7*   LYMPH 13.6*  2.1   MONO 10.1  1.5*   BASO 0.10   NRBC 0       CHEMISTRY LAST (LAST 24 HOURS)  Recent Labs   Lab 11/29/23  1301 11/30/23  0421     136 136   K 3.9  3.9 4.2     104 104   CO2 26  26 25   ANIONGAP 6*  6* 7*   BUN 13  13 19   CREATININE 1.2  1.2 1.4   GLU 92  92 92   CALCIUM 9.4  9.4 9.1   ALBUMIN 3.6  --    PROT 8.4  --    ALKPHOS 64  --    ALT 18  --    AST 16  --    BILITOT 0.6  --          CARDIAC PROFILE (LAST 24  HOURS)  Recent Labs   Lab 11/28/23  1835   BNP 7   TROPONINIHS 3.5       LAST 7 DAYS MICROBIOLOGY   Microbiology Results (last 7 days)       Procedure Component Value Units Date/Time    FANNY prep [[id number]] Collected: 11/30/23 1153    Order Status: Sent Specimen: Respiratory from Bronchial Wash Updated: 11/30/23 1204    AFB Culture & Smear [[id number]] Collected: 11/30/23 1153    Order Status: Sent Specimen: Respiratory from Bronchial Wash Updated: 11/30/23 1204    Fungus culture [[id number]] Collected: 11/30/23 1153    Order Status: Sent Specimen: Respiratory from Bronchial Wash Updated: 11/30/23 1204    KOH prep [[id number]] Collected: 11/30/23 1153    Order Status: Sent Specimen: Respiratory from Bronchial Wash Updated: 11/30/23 1204    AFB Culture & Smear [[id number]] Collected: 11/30/23 1153    Order Status: Sent Specimen: Respiratory from Bronchial Wash Updated: 11/30/23 1204    Fungus culture [[id number]] Collected: 11/30/23 1153    Order Status: Sent Specimen: Respiratory from Bronchial Wash Updated: 11/30/23 1204    KOH prep [[id number]] Collected: 11/30/23 1153    Order Status: Sent Specimen: Respiratory from Bronchial Wash Updated: 11/30/23 1203    AFB Culture & Smear [[id number]] Collected: 11/30/23 1153    Order Status: Sent Specimen: Respiratory from Bronchial Wash Updated: 11/30/23 1203    Fungus culture [[id number]] Collected: 11/30/23 1153    Order Status: Sent Specimen: Respiratory from Bronchial Wash Updated: 11/30/23 1203    Culture, Respiratory with Gram Stain [[id number]] Collected: 11/30/23 1153    Order Status: Sent Specimen: Respiratory from Bronchial Wash Updated: 11/30/23 1203    Culture, Respiratory with Gram Stain [[id number]] Collected: 11/29/23 1118    Order Status: Completed Specimen: Respiratory from Sputum Updated: 11/30/23 1106     Respiratory Culture Reduced Normal Respiratory marilu     Gram Stain (Respiratory) <10 epithelial cells per low power field.     Gram Stain  (Respiratory) Many WBC's     Gram Stain (Respiratory) Rare Gram negative rods    Blood Culture #1 **CANNOT BE ORDERED STAT** [[id number]] Collected: 11/28/23 2010    Order Status: Completed Specimen: Blood from Antecubital, Right Updated: 11/29/23 2232     Blood Culture, Routine No Growth to date      No Growth to date    Blood Culture #2 **CANNOT BE ORDERED STAT** [[id number]] Collected: 11/28/23 2021    Order Status: Completed Specimen: Blood Updated: 11/29/23 2232     Blood Culture, Routine No Growth to date      No Growth to date    Clostridium difficile EIA [[id number]] Collected: 11/29/23 1118    Order Status: Completed Specimen: Stool Updated: 11/29/23 1453     C. diff Antigen Negative     C difficile Toxins A+B, EIA Negative     Comment: Testing not recommended for children <24 months old.       Respiratory Infection Panel (PCR), Nasopharyngeal [[id number]] Collected: 11/28/23 2258    Order Status: Completed Specimen: Nasopharyngeal Swab Updated: 11/29/23 0611     Respiratory Infection Panel Source NP swab     Adenovirus Not Detected     Coronavirus 229E, Common Cold Virus Not Detected     Coronavirus HKU1, Common Cold Virus Not Detected     Coronavirus NL63, Common Cold Virus Not Detected     Coronavirus OC43, Common Cold Virus Not Detected     Comment: Coronavirus strains 229E, HKU1, NL63, and OC43 can cause the common   cold   and are not associated with the respiratory disease outbreak caused   by  the COVID-19 (SARS-CoV-2 novel Coronavirus) strain.           SARS-CoV2 (COVID-19) Qualitative PCR Not Detected     Human Metapneumovirus Not Detected     Human Rhinovirus/Enterovirus Not Detected     Influenza A (subtypes H1, H1-2009,H3) Not Detected     Influenza B Not Detected     Parainfluenza Virus 1 Not Detected     Parainfluenza Virus 2 Not Detected     Parainfluenza Virus 3 Not Detected     Parainfluenza Virus 4 Not Detected     Respiratory Syncytial Virus Not Detected     Bordetella Parapertussis  (ZB2635) Not Detected     Bordetella pertussis (ptxP) Not Detected     Chlamydia pneumoniae Not Detected     Mycoplasma pneumoniae Not Detected     Comment: Respiratory Infection Panel testing performed by Multiplex PCR.       Narrative:      Respiratory Infection Panel source->NP Swab    MRSA Screen by PCR [[id number]] Collected: 11/28/23 2258    Order Status: Completed Specimen: Nasopharyngeal Swab from Nasal Updated: 11/29/23 0210     MRSA SCREEN BY PCR Negative            MOST RECENT IMAGING  X-Ray Chest 1 View  Reason: Post bronch    FINDINGS:    Portable chest with comparison chest x-ray November 28, 2023 show normal cardiomediastinal silhouette. There is a small apical left pneumothorax.  Interstitial opacities of the bilateral lungs noted with patchy airspace opacities of the left lung base. Pulmonary vasculature is normal. No acute osseous abnormality.    IMPRESSION:    1.  Small apical left pneumothorax. Findings reported to Dr. Lisa Villarreal at 11:19 AM.  2.  Bilateral interstitial lung opacities opacities of the left lung base.    Electronically signed by:  Charles Baker DO  11/30/2023 11:20 AM CST Workstation: [id number]  [place] [employer] Usage  See Notes    This procedure was auto-finalized.      CURRENT VISIT EKG  No results found for this visit on 11/28/23.    ECHOCARDIOGRAM RESULTS  Results for orders placed during the hospital encounter of 11/17/23    Echo    Interpretation Summary    Left Ventricle: The left ventricle is normal in size. Normal wall thickness. There is concentric remodeling. Normal wall motion. There is normal systolic function with a visually estimated ejection fraction of 55 - 60%. There is normal diastolic function.    Right Ventricle: Normal right ventricular cavity size. Wall thickness is normal. Right ventricle wall motion  is normal. Systolic function is normal.    IVC/SVC: Normal venous pressure at 3 mmHg.      The patient is on room air         IMPRESSION AND PLAN  Progressive  pneumonic findings, patchy and in some places cavitating.  Mostly on the left  Positive aspergillus antigen, negative fungal smear and cultures to date  Procalcitonin positive and Aspergillus is not known to do this.  Leukocytosis worsening   Voriconazole will cover whatever nystatin is ordered for  Prior workup with ANCAs and anti-glomerular membrane antibodies were negative  Minimally positive   Prior KATJA negative  Ulcerative colitis on Remicade but none since October    Continue holding enoxaparin until we are certain he does not need a chest tube  Await bronchoscopy results  Continue voriconazole    Lisa Villarreal MD  Date of Service: 11/30/2023  12:55 PM

## 2023-11-30 NOTE — SUBJECTIVE & OBJECTIVE
Interval History:  See hospital course.  States does not feel well today.  Does have some discomfort over biopsy sites.  Nonproductive coughing episodes.  States he has chronic loose stools.  Not on anticoagulation prior to admission.  Denies history of LOLIS.  Ex-smoker 3 weeks.  Afebrile with T-max 98.6°, on room air.  Labs with WBC 15, hemoglobin 11.7, BNP 7, procalcitonin 1.878, CRP 61.  Cultures no growth to date.  Discussed with patient, no visitors at bedside.      Review of Systems   Constitutional:  Negative for fever.   Respiratory:  Positive for cough.    Gastrointestinal:  Positive for diarrhea (chronic).   Psychiatric/Behavioral:  Negative for confusion.      Objective:     Vital Signs (Most Recent):  Temp: 98 °F (36.7 °C) (11/30/23 1500)  Pulse: 87 (11/30/23 1500)  Resp: 18 (11/30/23 1500)  BP: 103/60 (11/30/23 1500)  SpO2: 98 % (11/30/23 1500) Vital Signs (24h Range):  Temp:  [97.7 °F (36.5 °C)-98.6 °F (37 °C)] 98 °F (36.7 °C)  Pulse:  [79-97] 87  Resp:  [18-27] 18  SpO2:  [92 %-99 %] 98 %  BP: ()/(51-83) 103/60     Weight: 129.3 kg (285 lb)  Body mass index is 47.43 kg/m².    Intake/Output Summary (Last 24 hours) at 11/30/2023 1730  Last data filed at 11/30/2023 1159  Gross per 24 hour   Intake 770 ml   Output --   Net 770 ml         Physical Exam  Vitals and nursing note reviewed.   Constitutional:       General: He is not in acute distress.     Appearance: He is obese. He is not ill-appearing.      Comments: Sitting in chair on laptop   HENT:      Head: Normocephalic and atraumatic.      Mouth/Throat:      Mouth: Mucous membranes are moist.   Cardiovascular:      Rate and Rhythm: Normal rate and regular rhythm.   Pulmonary:      Effort: No respiratory distress.      Breath sounds: No wheezing.      Comments: On RA  Abdominal:      Palpations: Abdomen is soft.      Tenderness: There is no abdominal tenderness.   Skin:     General: Skin is warm.   Neurological:      Mental Status: He is alert.  "  Psychiatric:      Comments: Low mood flat affect             Significant Labs: BMP:   Recent Labs   Lab 11/30/23  0421   GLU 92      K 4.2      CO2 25   BUN 19   CREATININE 1.4   CALCIUM 9.1     CBC:   Recent Labs   Lab 11/28/23  1835 11/29/23  1301 11/30/23  0421   WBC 17.31* 19.48*  19.48* 15.20*   HGB 12.6* 12.7*  12.7* 11.7*   HCT 38.3* 38.6*  38.6* 35.9*    361  361 353     CMP:   Recent Labs   Lab 11/28/23  1835 11/29/23  1301 11/30/23  0421    136  136 136   K 4.3 3.9  3.9 4.2    104  104 104   CO2 24 26  26 25   GLU 91 92  92 92   BUN 16 13  13 19   CREATININE 1.4 1.2  1.2 1.4   CALCIUM 9.3 9.4  9.4 9.1   PROT 8.3 8.4  --    ALBUMIN 3.4* 3.6  --    BILITOT 0.5 0.6  --    ALKPHOS 60 64  --    AST 25 16  --    ALT 20 18  --    ANIONGAP 7* 6*  6* 7*     Cardiac Markers:   Recent Labs   Lab 11/28/23  1835   BNP 7     Lactic Acid:   Recent Labs   Lab 11/28/23  1835   LACTATE 1.3     Magnesium: No results for input(s): "MG" in the last 48 hours.  POCT Glucose: No results for input(s): "POCTGLUCOSE" in the last 48 hours.  Troponin:   Recent Labs   Lab 11/28/23  1835   TROPONINIHS 3.5     TSH: No results for input(s): "TSH" in the last 4320 hours.  Urine Culture: No results for input(s): "LABURIN" in the last 48 hours.  Urine Studies:   Recent Labs   Lab 11/29/23 1921   COLORU Yellow   APPEARANCEUA Hazy*   PHUR 6.0   SPECGRAV >1.030*   PROTEINUA 1+*   GLUCUA Negative   KETONESU Trace*   BILIRUBINUA Negative   OCCULTUA Negative   NITRITE Negative   UROBILINOGEN Negative   LEUKOCYTESUR Negative   RBCUA 2   WBCUA 5   BACTERIA Negative   SQUAMEPITHEL 7   HYALINECASTS 32*       Significant Imaging: I have reviewed all pertinent imaging results/findings within the past 24 hours.  "

## 2023-11-30 NOTE — PROGRESS NOTES
Counts include 234 beds at the [facility] Medicine  Progress Note    Patient Name: Jacoby Jean-Baptiste  MRN: [id number]  Patient Class: IP- Inpatient   Admission Date: 11/28/2023  Length of Stay: 2 days  Attending Physician: Moon Armas MD  Primary Care Provider: Hunter Aguilar III, MD        Subjective:     Principal Problem:Cavitary pneumonia        HPI:  48 year old male with a PMH Cdiff, UC on Remicade, DM2, anemia, tobacco use, HLD, and anxiety.     Patient recently discharged after hospital admission for shortness breath, chest pain abdominal pain associated with CT chest that showed patchy nodular infiltrates, one of which is cavitary in the left lower lobe, suspicion for septic emboli and/or necrosis .  Patient treated with antibiotics, prophylactic p.o. vancomycin.  BAL cultures negative. KOH and AFB cultures negative, MONSERRAT negative for IE.  Anti GBM was negative.  Patient was treated with IV Levaquin and doxycycline. PO vancomycin given for C diff prophylaxis.     Patient states he has not really gotten any better since being treated for his condition.  Some tests were pending after discharge.  His aspergillus antigen is positive at 1.16    Overview/Hospital Course:  Assumed care of this patient on 11/30/23.  Patient with history of morbid obesity with BMI 46, ulcerative colitis, followed by Dr. Jean, previously on Remicade, diabetes mellitus, recent ex-smoker 3 weeks, anxiety, history of SVT status post prior ablation.  Recent [facility] admission 11/17 to 11/23 with concern for cavitating pneumonia versus septic emboli with necrosis, underwent bronchoscopy, monserrat, no vegetation or atrial thrombus, discharge to complete course of antibiotics.  Re-presented as no improvement, CT with progressive changes, multifocal nodular consolidation with central cavity possible due to septic emboli, lymphadenopathy.  Seen by Pulmonary and Infectious Disease.  Currently on piperacillin/tazobactam on voriconazole (aspergillus  antigen 1.16).  EKG on 11/28 with concern for atrial flutter with 4-1, did recently have outpatient monitor, predominant rhythm sinus with brief runs of SVT, followed by cardiologist Dr. Roman.  On 11/30 underwent bronchoscopy with transbronchial biopsy submitted, procedure complicated by small apical pneumothorax.    Interval History:  See hospital course.  States does not feel well today.  Does have some discomfort over biopsy sites.  Nonproductive coughing episodes.  States he has chronic loose stools.  Not on anticoagulation prior to admission.  Denies history of LOLIS.  Ex-smoker 3 weeks.  Afebrile with T-max 98.6°, on room air.  Labs with WBC 15, hemoglobin 11.7, BNP 7, procalcitonin 1.878, CRP 61.  Cultures no growth to date.  Discussed with patient, no visitors at bedside.      Review of Systems   Constitutional:  Negative for fever.   Respiratory:  Positive for cough.    Gastrointestinal:  Positive for diarrhea (chronic).   Psychiatric/Behavioral:  Negative for confusion.      Objective:     Vital Signs (Most Recent):  Temp: 98 °F (36.7 °C) (11/30/23 1500)  Pulse: 87 (11/30/23 1500)  Resp: 18 (11/30/23 1500)  BP: 103/60 (11/30/23 1500)  SpO2: 98 % (11/30/23 1500) Vital Signs (24h Range):  Temp:  [97.7 °F (36.5 °C)-98.6 °F (37 °C)] 98 °F (36.7 °C)  Pulse:  [79-97] 87  Resp:  [18-27] 18  SpO2:  [92 %-99 %] 98 %  BP: ()/(51-83) 103/60     Weight: 129.3 kg (285 lb)  Body mass index is 47.43 kg/m².    Intake/Output Summary (Last 24 hours) at 11/30/2023 1730  Last data filed at 11/30/2023 1159  Gross per 24 hour   Intake 770 ml   Output --   Net 770 ml         Physical Exam  Vitals and nursing note reviewed.   Constitutional:       General: He is not in acute distress.     Appearance: He is obese. He is not ill-appearing.      Comments: Sitting in chair on laptop   HENT:      Head: Normocephalic and atraumatic.      Mouth/Throat:      Mouth: Mucous membranes are moist.   Cardiovascular:      Rate and  "Rhythm: Normal rate and regular rhythm.   Pulmonary:      Effort: No respiratory distress.      Breath sounds: No wheezing.      Comments: On RA  Abdominal:      Palpations: Abdomen is soft.      Tenderness: There is no abdominal tenderness.   Skin:     General: Skin is warm.   Neurological:      Mental Status: He is alert.   Psychiatric:      Comments: Low mood flat affect             Significant Labs: BMP:   Recent Labs   Lab 11/30/23  0421   GLU 92      K 4.2      CO2 25   BUN 19   CREATININE 1.4   CALCIUM 9.1     CBC:   Recent Labs   Lab 11/28/23 1835 11/29/23  1301 11/30/23  0421   WBC 17.31* 19.48*  19.48* 15.20*   HGB 12.6* 12.7*  12.7* 11.7*   HCT 38.3* 38.6*  38.6* 35.9*    361  361 353     CMP:   Recent Labs   Lab 11/28/23 1835 11/29/23  1301 11/30/23  0421    136  136 136   K 4.3 3.9  3.9 4.2    104  104 104   CO2 24 26  26 25   GLU 91 92  92 92   BUN 16 13  13 19   CREATININE 1.4 1.2  1.2 1.4   CALCIUM 9.3 9.4  9.4 9.1   PROT 8.3 8.4  --    ALBUMIN 3.4* 3.6  --    BILITOT 0.5 0.6  --    ALKPHOS 60 64  --    AST 25 16  --    ALT 20 18  --    ANIONGAP 7* 6*  6* 7*     Cardiac Markers:   Recent Labs   Lab 11/28/23  1835   BNP 7     Lactic Acid:   Recent Labs   Lab 11/28/23  1835   LACTATE 1.3     Magnesium: No results for input(s): "MG" in the last 48 hours.  POCT Glucose: No results for input(s): "POCTGLUCOSE" in the last 48 hours.  Troponin:   Recent Labs   Lab 11/28/23  1835   TROPONINIHS 3.5     TSH: No results for input(s): "TSH" in the last 4320 hours.  Urine Culture: No results for input(s): "LABURIN" in the last 48 hours.  Urine Studies:   Recent Labs   Lab 11/29/23 1921   COLORU Yellow   APPEARANCEUA Hazy*   PHUR 6.0   SPECGRAV >1.030*   PROTEINUA 1+*   GLUCUA Negative   KETONESU Trace*   BILIRUBINUA Negative   OCCULTUA Negative   NITRITE Negative   UROBILINOGEN Negative   LEUKOCYTESUR Negative   RBCUA 2   WBCUA 5   BACTERIA Negative   SQUAMEPITHEL 7 "   HYALINECASTS 32*       Significant Imaging: I have reviewed all pertinent imaging results/findings within the past 24 hours.    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     Plan:  Continue care on medical floor with remote telemetry monitoring  On 11/30 underwent transbronchial biopsy, follow-up results, procedure complicated by small apical pneumothorax   Currently on piperacillin/tazobactam and voriconazole, adjust as per Infectious Disease recommendation   Continue prophylactic oral vancomycin with history of C diff, 125 mg b.i.d.  Recent trans thoracic echocardiogram with no atrial thrombus or vegetation  Ex-smoker 3 weeks, continue to reinforce smoking cessation counseling  Needs lifestyle modification for weight loss   Continue insulin with Accu-Cheks, as needed hypoglycemic measures   Remicade remains on hold, missed last dose due to ongoing pulmonary issues   EKG on 11/28 reporting atrial flutter with 4-1, appreciated in some leads but also ? artifact, history of SVT, repeat EKG and continue telemetry monitoring  Electrolytes sliding scale repletion  A.m.  labs ordered  Appreciate all consultant's input   Further plan as per hospital course    VTE Risk Mitigation (From admission, onward)           Ordered     IP VTE HIGH RISK PATIENT  Once         11/28/23 2159     Place sequential compression device  Until discontinued         11/28/23 2159                    Discharge Planning   GERARDO: 12/3/2023     Code Status: Full Code   Is the patient medically ready for discharge?:     Reason for patient still in hospital (select all that apply): Patient trending condition  Discharge Plan A: Home                  Moon Armas MD  Department of Hospital Medicine   [facility]

## 2023-11-30 NOTE — CONSULTS
[facility]   Department of Infectious Disease  Consult Note        PATIENT NAME: Jacoby Jean-Baptiste  MRN: [id number]  TODAY'S DATE: 11/29/2023  ADMIT DATE: 11/28/2023    CHIEF COMPLAINT: Shortness of Breath (SOB x several weeks. Released from [facility] last for same issue. Not feeling any better. ) and Nausea    PRINCIPLE PROBLEM: Septic embolism    REASON FOR CONSULT: Atypical pneumonia    ASSESSMENT and PLAN     Sepsis secondary to cavitary/atypical pneumonia with necrosis + Aspergillus Ag s/p levaquin and doxy, not improved  Aspergillus Ag 1.16 positive  -->265.5-->187.6, high  Procal 17-->12-->4.8-->5.3  Blood cultures x2 sets no growth to date, pending final   MRSA nasal swab negative   RVP negative    2.  Oral thrush    3. H/o UC on Remicade, immunocompromised              TB gold negative              PPD negative    4. 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 PMHx:  Diabetes, HTN, HLD, anemia, smoker, anxiety/insomnia, hep B positive serology, GERD, UC on Remicade, last dose Oct/2023    Recommendations:    Plan for bronch tomorrow, please send tissue for Gram stain and cultures including AFB and fungal   Continue Zosyn 4.5 g IV q8h over 4h infusion    Start voriconazole 400mg q12 X 2 doses, continue 200mg twice a day  Check Vori level 12/2 with AM labs  Oral vanco 125mg PO twice a day ppx for C diff while on antimicrobials   Follow Fungitell  CRP ordered with AM labs   Monitor WBC   Aspiration precautions     D/w patient, Dr Aguilar     Thank you for this consult. Please send [employer] secure chat with any questions.    HPI      Jacoby Jean-Baptiste is a 48 y.o. male , morbidly obese, BMI 47.9 kg/m2, active smoker, known to our service from prior admission 11 18 for cavitating pneumonia, he has past medical history of diabetes, PSVT status post ablation, HTN, HLD, anemia, active smoker, anxiety/insomnia, hep B positive serology, GERD, C diff colitis status post 2 fecal  transplants, and ulcerative colitis on Remicade, last infusion early October who was previously seen for cavitating pneumonia.  He was seen last admission, suspicion for probable aspiration pneumonia he was discharged on levofloxacin and doxycycline.  Patient presented yesterday to the ER due to not improvement of his symptoms, he is still feels ill, fatigued, persistent productive cough, subjective fever and chills, generalized weakness and malaise.  He is also complaining of nausea, no vomit.  He denies headache, shortness of breath, no chest pain or abdominal pain, no dysuria increased urinary a little drunk frequency.  He reports 5-6 loose bowel movements today.      Further workup with positive Aspergillus antigen 1.16. Fungitell in process.   TB gold negative    Patient seen examined in the ER, he is currently on room air, feels ill, not improved   Labs on admission with leukocytosis 19.4, left shift 75.7%, lymphs 11.7%, H&H 12.7/38.6, , platelet count 361  Creatinine 1.4, repeat today 1.2   Normal LFTs  Procalcitonin 5.3, prior 12, trending down   MRSA nasal swab negative   RVP negative   UA negative for UTI   Stool for C diff negative   Chest x-ray from 11/28 with improvement upon comparison of resolving interstitial left lower nodular opacities.  CT chest/abdomen/pelvis with multiple areas of nodular consolidation throughout the lung parenchyma suggestive of necrotizing pneumonia.  Hepatosplenomegaly with fatty infiltration of the liver.  Small nonobstructing kidney stones bilaterally.      Outdoor activities: Lives at home alone, denies exposure to mold. Active smoker, no alcohol, denies drug use. Works at a car [employer].   Travel: None  Implants:  prior fecal transplant  Antibiotic history:  See HPI    Past Medical History:   Diagnosis Date    Diabetes mellitus 01/2022    Hyperlipidemia 2015    Hypertension 2015    Insomnia 2015       Past Surgical History:   Procedure Laterality Date     BRONCHOSCOPY WITH FLUOROSCOPY Left 11/20/2023    Procedure: BRONCHOSCOPY, WITH FLUOROSCOPY;  Surgeon: Lisa Villarreal MD;  Location: [facility] ENDO;  Service: Pulmonary;  Laterality: Left;    CARDIAC ELECTROPHYSIOLOGY MAPPING AND ABLATION  2017    SVT    CHOLECYSTECTOMY  2011    COLONOSCOPY N/A 5/3/2022    Procedure: COLONOSCOPY;  Surgeon: Shan Jean III, MD;  Location: [facility] ENDO;  Service: Endoscopy;  Laterality: N/A;    COLONOSCOPY WITH FECAL MICROBIOTA TRANSFER N/A 3/21/2023    Procedure: COLONOSCOPY, WITH FECAL MICROBIOTA TRANSFER;  Surgeon: David Millan MD;  Location: [facility] ENDO;  Service: Endoscopy;  Laterality: N/A;    ESOPHAGOGASTRODUODENOSCOPY N/A 4/24/2023    Procedure: EGD (ESOPHAGOGASTRODUODENOSCOPY);  Surgeon: Shan Jean III, MD;  Location: [facility] ENDO;  Service: Endoscopy;  Laterality: N/A;    GANGLION CYST EXCISION Left 1992    UMBILICAL HERNIA REPAIR  2011    with mesh       Family History   Problem Relation Age of Onset    Asthma Mother        Social History     Socioeconomic History    Marital status: Single   Tobacco Use    Smoking status: Every Day     Current packs/day: 1.00     Average packs/day: 1 pack/day for 30.0 years (30.0 ttl pk-yrs)     Types: Cigarettes    Smokeless tobacco: Never    Tobacco comments:     DO NOT SMOKE DOS   Substance and Sexual Activity    Alcohol use: Yes     Comment: ocass    Drug use: Not Currently    Sexual activity: Not Currently     Social Determinants of Health     Financial Resource Strain: Low Risk  (4/21/2023)    Overall Financial Resource Strain (CARDIA)     Difficulty of Paying Living Expenses: Not very hard   Food Insecurity: No Food Insecurity (4/21/2023)    Hunger Vital Sign     Worried About Running Out of Food in the Last Year: Never true     Ran Out of Food in the Last Year: Never true   Transportation Needs: No Transportation Needs (4/21/2023)    PRAPARE - Transportation     Lack of Transportation (Medical): No     Lack of  Transportation (Non-Medical): No   Physical Activity: Inactive (4/21/2023)    Exercise Vital Sign     Days of Exercise per Week: 0 days     Minutes of Exercise per Session: 0 min   Stress: No Stress Concern Present (4/21/2023)    [employer] [employer] - Occupational Stress Questionnaire     Feeling of Stress : Only a little   Social Connections: Socially Isolated (4/21/2023)    Social Connection and Isolation Panel [NHANES]     Frequency of Communication with Friends and Family: Twice a week     Frequency of Social Gatherings with Friends and Family: Twice a week     Attends [employer] Services: Never     Active Member of Clubs or Organizations: No     Attends Club or Organization Meetings: Never     Marital Status: Never    Housing Stability: Low Risk  (4/21/2023)    Housing Stability Vital Sign     Unable to Pay for Housing in the Last Year: No     Number of Places Lived in the Last Year: 1     Unstable Housing in the Last Year: No       Review of patient's allergies indicates:  No Known Allergies    Current Outpatient Medications   Medication Instructions    busPIRone (BUSPAR) 7.5 mg, Oral, 3 times daily    dicyclomine (BENTYL) 10 mg, Oral, 3 times daily PRN    diphenhydrAMINE (BENADRYL) 25 mg, Oral, Every 6 hours PRN    doxycycline (VIBRA-TABS) 100 mg, Oral, 2 times daily    HYDROcodone-acetaminophen (NORCO) 5-325 mg per tablet 1 tablet, Oral, Every 6 hours PRN    levoFLOXacin (LEVAQUIN) 750 mg, Oral, Daily    metFORMIN (GLUCOPHAGE-XR) 500 mg, Oral, 2 times daily with meals    metoprolol succinate (TOPROL-XL) 50 mg, Oral, Daily    OZEMPIC 1 mg, Subcutaneous, Every 7 days    pantoprazole (PROTONIX) 40 mg, Oral, 2 times daily    promethazine (PHENERGAN) 12.5 mg, Oral, 4 times daily PRN    sertraline (ZOLOFT) 100 mg, Oral, Daily    simvastatin (ZOCOR) 20 mg, Oral, Nightly    vancomycin 125 mg, Oral, 2 times daily    varenicline (CHANTIX STARTING MONTH BOX) 0.5 mg (11)- 1 mg (42) tablet Take  one 0.5mg tab by mouth once daily X3 days,then increase to one 0.5mg tab twice daily X4 days,then increase to one 1mg tab twice daily    zolpidem (AMBIEN) 10 mg, Oral, Nightly PRN         Review of Systems   Constitutional:  Positive for activity change, appetite change and fatigue. Negative for chills and fever.   HENT:  Negative for sinus pain.    Respiratory:  Positive for cough. Negative for chest tightness and shortness of breath.    Cardiovascular:  Negative for chest pain.   Gastrointestinal:  Positive for nausea. Negative for abdominal pain and diarrhea.   Genitourinary:  Negative for dysuria and frequency.   Musculoskeletal:  Negative for back pain.   Skin:  Negative for rash.   Neurological:  Negative for headaches.   Psychiatric/Behavioral:  Negative for confusion.               OBJECTIVE     Temp:  [97.4 °F (36.3 °C)-98.4 °F (36.9 °C)] 98 °F (36.7 °C)  Pulse:  [] 89  Resp:  [18-22] 18  SpO2:  [94 %-100 %] 95 %  BP: (101-149)/(57-93) 149/66         Physical Exam  Constitutional:       Appearance: He is obese. He is ill-appearing.   HENT:      Mouth/Throat:      Mouth: Mucous membranes are moist.      Comments: Very poor oral hygiene, severe decay, black dental roots noted - oral thrush ++  Eyes:      Extraocular Movements: Extraocular movements intact.      Conjunctiva/sclera: Conjunctivae normal.      Pupils: Pupils are equal, round, and reactive to light.   Cardiovascular:      Rate and Rhythm: Normal rate and regular rhythm.      Pulses: Normal pulses.      Heart sounds: Normal heart sounds.   Pulmonary:      Effort: Pulmonary effort is normal.      Breath sounds: Normal breath sounds.   Abdominal:      General: Bowel sounds are normal.      Palpations: Abdomen is soft.      Tenderness: There is no abdominal tenderness. There is no rebound.      Comments: Obese   Musculoskeletal:      Cervical back: Normal range of motion and neck supple.      Right lower leg: No edema.      Left lower leg: No  "edema.   Skin:     General: Skin is warm.      Capillary Refill: Capillary refill takes less than 2 seconds.      Findings: Lesion present.      Comments: Multiple resolving tiny scabs, from scratching on arms, and legs   Neurological:      Mental Status: He is alert and oriented to person, place, and time. Mental status is at baseline.      Sensory: No sensory deficit.      Motor: No weakness.   Psychiatric:         Thought Content: Thought content normal.           Wounds: None  VAD: PIV  ISOLATION: None    CBC LAST 7  Recent Labs   Lab 11/23/23  0355 11/28/23  1835 11/29/23  1301   WBC 15.42* 17.31* 19.48*  19.48*   RBC 3.63* 3.72* 3.69*  3.69*   HGB 12.3* 12.6* 12.7*  12.7*   HCT 36.6* 38.3* 38.6*  38.6*   * 103* 105*  105*   MCH 33.9* 33.9* 34.4*  34.4*   MCHC 33.6 32.9 32.9  32.9   RDW 13.7 13.5 13.7  13.7    349 361  361   MPV 9.8 9.8 9.8  9.8   GRAN 59.9  9.3* 73.1*  12.7* 75.7*  75.7*  14.7*  14.7*   LYMPH 12.2*  1.9 11.7*  2.0 10.9*  10.9*  2.1  2.1   MONO 13.7  2.1* 9.0  1.6* 9.2  9.2  1.8*  1.8*   BASO 0.11 0.12 0.08  0.08   NRBC 0 0 0  0       CHEMISTRY LAST 7  Recent Labs   Lab 11/23/23  0355 11/28/23 1835 11/29/23  1301    136 136  136   K 3.9 4.3 3.9  3.9    105 104  104   CO2 26 24 26  26   ANIONGAP 11 7* 6*  6*   BUN 7 16 13  13   CREATININE 0.9 1.4 1.2  1.2   * 91 92  92   CALCIUM 8.9 9.3 9.4  9.4   MG 1.4*  --   --    ALBUMIN 3.2* 3.4* 3.6   PROT 7.3 8.3 8.4   ALKPHOS 68 60 64   ALT 20 20 18   AST 17 25 16   BILITOT 0.6 0.5 0.6       Estimated Creatinine Clearance: 93.8 mL/min (based on SCr of 1.2 mg/dL).    INFLAMMATORY/PROCAL  LAST 7  Recent Labs   Lab 11/23/23  0355 11/28/23  1835   PROCAL 2.066* 5.349*     No results found for: "ESR"  CRP   Date Value Ref Range Status   11/22/2023 187.6 (H) 0.0 - 8.2 mg/L Final   11/20/2023 265.5 (H) 0.0 - 8.2 mg/L Final   11/17/2023 228.2 (H) 0.0 - 8.2 mg/L Final   07/25/2023 0.46 <0.76 " mg/dL Final   04/06/2023 11.55 (H) <0.76 mg/dL Final   01/11/2023 20.88 (H) <0.76 mg/dL Final   01/06/2023 11.02 (H) <0.76 mg/dL Final       PRIOR  MICROBIOLOGY:       Latest Reference Range & Units 11/19/23 05:39 11/19/23 05:40 11/20/23 12:01   Aspergillus Antigen 0.00 - 0.49 Index  1.16 (H)    CRYPTOCOCCUS ANTIGEN, SERUM Negative  Negative     AFB CULTURE STAIN    No acid fast bacilli seen.  Testing performed by:  [facility]  [address] [place] [address]  Dr.Brian Marge MD  No acid fast bacilli seen.  Testing performed by:  [facility]  [address] [place] [address]  Dr.Brian Marge MD   Quantiferon Mitogen Value IU/mL  9.76    Quantiferon Nil Value IU/mL  0.02    QuantiFERON-TB Gold Plus Negative   Negative    QuantiFERON TB1 Ag Value IU/mL  0.05    QuantiFERON TB2 Ag Value IU/mL  0.03    HIV Screen 4th Generation wRfx Non Reactive   Non Reactive    (H): Data is abnormally high  Susceptibility data from last 90 days.  Collected Specimen Info Organism   11/19/23 Blood No growth after 5 days.   11/18/23 Blood No growth after 5 days.   11/17/23 Blood from Antecubital, Right Arm No growth after 5 days.         LAST 7 DAYS MICROBIOLOGY   Microbiology Results (last 7 days)       Procedure Component Value Units Date/Time    Culture, Respiratory with Gram Stain [[id number]] Collected: 11/29/23 1118    Order Status: Completed Specimen: Respiratory from Sputum Updated: 11/29/23 1851     Gram Stain (Respiratory) <10 epithelial cells per low power field.     Gram Stain (Respiratory) Many WBC's     Gram Stain (Respiratory) Rare Gram negative rods    Clostridium difficile EIA [[id number]] Collected: 11/29/23 1118    Order Status: Completed Specimen: Stool Updated: 11/29/23 1453     C. diff Antigen Negative     C difficile Toxins A+B, EIA Negative     Comment: Testing not recommended for children <24 months old.       Respiratory Infection Panel (PCR), Nasopharyngeal  [[id number]] Collected: 11/28/23 2258    Order Status: Completed Specimen: Nasopharyngeal Swab Updated: 11/29/23 0611     Respiratory Infection Panel Source NP swab     Adenovirus Not Detected     Coronavirus 229E, Common Cold Virus Not Detected     Coronavirus HKU1, Common Cold Virus Not Detected     Coronavirus NL63, Common Cold Virus Not Detected     Coronavirus OC43, Common Cold Virus Not Detected     Comment: Coronavirus strains 229E, HKU1, NL63, and OC43 can cause the common   cold   and are not associated with the respiratory disease outbreak caused   by  the COVID-19 (SARS-CoV-2 novel Coronavirus) strain.           SARS-CoV2 (COVID-19) Qualitative PCR Not Detected     Human Metapneumovirus Not Detected     Human Rhinovirus/Enterovirus Not Detected     Influenza A (subtypes H1, H1-2009,H3) Not Detected     Influenza B Not Detected     Parainfluenza Virus 1 Not Detected     Parainfluenza Virus 2 Not Detected     Parainfluenza Virus 3 Not Detected     Parainfluenza Virus 4 Not Detected     Respiratory Syncytial Virus Not Detected     Bordetella Parapertussis (TK7035) Not Detected     Bordetella pertussis (ptxP) Not Detected     Chlamydia pneumoniae Not Detected     Mycoplasma pneumoniae Not Detected     Comment: Respiratory Infection Panel testing performed by Multiplex PCR.       Narrative:      Respiratory Infection Panel source->NP Swab    Blood Culture #1 **CANNOT BE ORDERED STAT** [[id number]] Collected: 11/28/23 2010    Order Status: Completed Specimen: Blood from Antecubital, Right Updated: 11/29/23 0358     Blood Culture, Routine No Growth to date    Blood Culture #2 **CANNOT BE ORDERED STAT** [[id number]] Collected: 11/28/23 2021    Order Status: Completed Specimen: Blood Updated: 11/29/23 0358     Blood Culture, Routine No Growth to date    MRSA Screen by PCR [[id number]] Collected: 11/28/23 2258    Order Status: Completed Specimen: Nasopharyngeal Swab from Nasal Updated: 11/29/23 0210     MRSA SCREEN  BY PCR Negative              CURRENT/PREVIOUS VISIT EKG  Results for orders placed or performed during the hospital encounter of 11/28/23   EKG 12-lead    Collection Time: 11/28/23  5:18 PM    Narrative    Test Reason : R06.02,    Vent. Rate : 087 BPM     Atrial Rate : 340 BPM     P-R Int : 000 ms          QRS Dur : 078 ms      QT Int : 374 ms       P-R-T Axes : 046 021 036 degrees     QTc Int : 450 ms    Atrial flutter with 4:1 A-V conduction  Abnormal ECG  When compared with ECG of 19-NOV-2023 07:14,  Atrial flutter has replaced Sinus rhythm    Referred By: [email or web address]   SELF           Confirmed By:        Significant Labs: All pertinent labs within the past 24 hours have been reviewed.     Significant Imaging: I have reviewed all relevant and available imaging results/findings within the past 24 hours.    CXR 11/28: Improvement upon comparison with resolving interstitial and left lower lobe nodular alveolar opacities with no     CT chest:   1.  Interval development of multiple areas of nodular consolidation throughout the lung parenchyma, some of which have central cavitation. Findings are suggestive of septic emboli. Necrotizing pneumonia could also have this appearance   2.  Hepatosplenomegaly with fatty infiltration of the liver.   3.  Small nonobstructing kidney stones bilaterally.   .       Ophelia Galindo MD  Date of Service: 11/29/2023

## 2023-11-30 NOTE — TRANSFER OF CARE
"Anesthesia Transfer of Care Note    Patient: Jacoby Jean-Baptiset    Procedure(s) Performed: Procedure(s) (LRB):  BRONCHOSCOPY, WITH FLUOROSCOPY (N/A)    Patient location: PACU    Anesthesia Type: general    Transport from OR: Transported from OR on 100% O2 by closed face mask with adequate spontaneous ventilation    Post pain: adequate analgesia    Post assessment: no apparent anesthetic complications    Post vital signs: stable    Level of consciousness: awake    Nausea/Vomiting: no nausea/vomiting    Complications: none    Transfer of care protocol was followedComments: Patient stable, report to RN, questions answered.  I am available during the recovery time for any further needs       Last vitals: Visit Vitals  BP (!) 113/59 (BP Location: Right arm, Patient Position: Sitting)   Pulse 82   Temp 36.8 °C (98.2 °F) (Temporal)   Resp 18   Ht 5' 5" (1.651 m)   Wt 129.3 kg (285 lb)   SpO2 96%   BMI 47.43 kg/m²     "

## 2023-11-30 NOTE — PLAN OF CARE
Pt AAOx 4. Rested well this shift. Pt showered at 2100. NPO since midnight. No acute events overnight. No signs, symptoms, or complaints of distress at this time. Care ongoing.     Problem: Adult Inpatient Plan of Care  Goal: Plan of Care Review  Outcome: Ongoing, Progressing  Goal: Patient-Specific Goal (Individualized)  Outcome: Ongoing, Progressing  Goal: Absence of Hospital-Acquired Illness or Injury  Outcome: Ongoing, Progressing  Goal: Optimal Comfort and Wellbeing  Outcome: Ongoing, Progressing  Goal: Readiness for Transition of Care  Outcome: Ongoing, Progressing     Problem: Bariatric Environmental Safety  Goal: Safety Maintained with Care  Outcome: Ongoing, Progressing     Problem: Diabetes Comorbidity  Goal: Blood Glucose Level Within Targeted Range  Outcome: Ongoing, Progressing     Problem: Infection  Goal: Absence of Infection Signs and Symptoms  Outcome: Ongoing, Progressing

## 2023-11-30 NOTE — PROCEDURES
Bronchoscopy    Indication:  Worsening nodular cavitating pneumonia  Medications:  Per anesthesia  Specimens:  Bronchial lavage, transbronchial brushes, transbronchial lung tissue biopsies  Complications:  Postprocedural pneumothorax    The bronchoscope was introduced through the endotracheal tube.  The trachea was midline. The greg was sharp.  There was thick white mucus in the major airways.  This was suctioned clear.  The right upper lobe, right middle lobe, and right lower lobe subdivided into the usual subsegments. There were no endobronchial or submucosal abnormalities noted. The left upper lobe and left lower lobe subdivided into the usual subsegments. There was edema and thickening of the left upper lobe anterior bronchus.  The bronchoscope was positioned in the right upper lobe anterior subsegment and a lavage was taken  60 cc were installed and 25 were returned.  This was followed by 2 transbronchial brushes and then 3 transbronchial lung tissue biopsies with the C-arm turned laterally so that the anterior infiltrate could be biopsied.  I was unable to advance the biopsy forceps all the way to the anterior thoracic wall and therefore the decision was made to shift the remainder of the biopsies to the heavily infiltrated left lower lobe.  The remaining 7 transbronchial biopsies were taken from the left lower lobe in different subsegments.  The specimens were sent for C and S, KOH and fungal culture, AFB and mycobacterial culture, cytology,and histopathology. A post procedure x-ray has been obtained.  There is a small apical pneumothorax.  A repeat x-ray will be obtained in 4 hours.  The patient tolerated the procedure well.

## 2023-11-30 NOTE — ANESTHESIA PROCEDURE NOTES
Intubation    Date/Time: 11/30/2023 9:54 AM    Performed by: Toan Peres CRNA  Authorized by: Jose Jones MD    Intubation:     Induction:  Rapid sequence induction    Intubated:  Postinduction    Mask Ventilation:  Not attempted    Attempts:  1    Attempted By:  CRNA    Method of Intubation:  Video laryngoscopy    Blade:  Blackwell 3    Laryngeal View Grade: Grade I - full view of cords      Difficult Airway Encountered?: No      Complications:  None    Airway Device:  Oral endotracheal tube    Airway Device Size:  9.0    Style/Cuff Inflation:  Cuffed    Inflation Amount (mL):  6    Tube secured:  22    Placement Verified By:  Capnometry    Complicating Factors:  None    Findings Post-Intubation:  BS equal bilateral

## 2023-11-30 NOTE — ANESTHESIA POSTPROCEDURE EVALUATION
Anesthesia Post Evaluation    Patient: Jacoby Jean-Baptiste    Procedure(s) Performed: Procedure(s) (LRB):  BRONCHOSCOPY, WITH FLUOROSCOPY (N/A)    Final Anesthesia Type: general      Patient location during evaluation: PACU  Patient participation: Yes- Able to Participate  Level of consciousness: awake and alert, oriented and awake  Post-procedure vital signs: reviewed and stable  Pain management: adequate  Airway patency: patent  LOLIS mitigation strategies: Verification of full reversal of neuromuscular block, Extubation while patient is awake and Extubation and recovery carried out in lateral, semiupright, or other nonsupine position  PONV status at discharge: No PONV  Anesthetic complications: no      Cardiovascular status: blood pressure returned to baseline, hemodynamically stable and stable  Respiratory status: unassisted, spontaneous ventilation and nasal cannula  Hydration status: euvolemic  Follow-up not needed.              Vitals Value Taken Time   /56 11/30/23 1130   Temp 36.6 °C (97.8 °F) 11/30/23 1031   Pulse 93 11/30/23 1143   Resp 22 11/30/23 1143   SpO2 95 % 11/30/23 1143   Vitals shown include unvalidated device data.      No case tracking events are documented in the log.      Pain/Kusum Score: Pain Rating Prior to Med Admin: 5 (11/30/2023 11:30 AM)  Kusum Score: 10 (11/30/2023 11:15 AM)

## 2023-11-30 NOTE — PROGRESS NOTES
[facility]  Department of Infectious Disease  Progress Note        PATIENT NAME: Jacoby Jean-Baptiste  MRN: [id number]  TODAY'S DATE: 11/30/2023  ADMIT DATE: 11/28/2023    PRINCIPLE PROBLEM: Septic embolism    INTERVAL HISTORY      11/30:  Interim reviewed, patient seen examined at bedside, he is sitting in the chair.  He looks rested today, still complaining of malaise.  He is also complaining of mild left chest pain after bronch. Chest x-ray postprocedure with small apical pneumothorax.  Status post bronchoscopy this morning, discussed with Dr. Villarreal and procedure note reviewed; no endobronchial of mucosal abnormalities noted.  There was edema and thickening of the left upper lobe anterior bronchus.  2 transbronchial brushing and 3 transbronchial lung tissues biopsies taken.  Hemodynamically stable, afebrile.  Labs reviewed, white count leukocytosis down to 15.2, no left shift, H&H 11.7/35.9, .  CRP down to 61.6, procalcitonin down to 1.8, both trending down.  Fungitell in process.  Micro reviewed, so far no growth.  BAL wash KOH with no yeast or fungal elements seen, pending cultures.  Stool for C diff negative.     Antibiotics (From admission, onward)      Start     Stop Route Frequency Ordered    11/30/23 0900  vancomycin 125 mg/5 mL oral solution 125 mg         -- Oral 2 times daily 11/29/23 1914 11/29/23 2145  piperacillin-tazobactam 4.5 g in dextrose 5 % 100 mL IVPB (ready to mix)         -- IV Every 8 hours (non-standard times) 11/29/23 2039            Antifungals (From admission, onward)      Start     Stop Route Frequency Ordered    11/30/23 0900  voriconazole tablet 200 mg        See Hyperspace for full Linked Orders Report.    -- Oral 2 times daily 11/29/23 0814                 ASSESSMENT and PLAN     Sepsis secondary to cavitary/atypical pneumonia with necrosis + Aspergillus Ag s/p levaquin and doxy, not improved s/p bronch and biopsies 11/30  Aspergillus Ag 1.16 positive  CRP  228-->265.5-->187.6-->61.6, trending down   Procal 17-->12-->4.8-->5.3-->1.8  Blood cultures x2 sets no growth to date, pending final   MRSA nasal swab negative   RVP negative     2.  Oral thrush, improved     3. H/o UC on Remicade, immunocompromised              TB gold negative              PPD negative     4. 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 PMHx:  Diabetes, HTN, HLD, anemia, smoker, anxiety/insomnia, hep B positive serology, GERD, UC on Remicade, last dose Oct/2023     Recommendations:    Follow bronchial wash cultures including Aspergillus antigen from BAL  Continue Zosyn 4.5 g IV q8h over 4h infusion    Continue voriconazole 200mg twice a day  Check Vori level 12/2 ordered with AM labs  Oral vanco 125mg PO twice a day ppx for C diff while on antimicrobials   Follow Fungitell  Peridex twice a day   Monitor WBC   Aspiration precautions      D/w patient, Dr Villarreal     Thank you for this consult. Please send [employer] secure chat with any questions.      SUBJECTIVE        Review of Systems  Review of systems obtained and negative except as stated above in Interval History      OBJECTIVE     Temp:  [97.7 °F (36.5 °C)-98.6 °F (37 °C)] 98 °F (36.7 °C)  Pulse:  [79-97] 87  Resp:  [18-27] 18  SpO2:  [92 %-99 %] 98 %  BP: ()/(51-83) 103/60    Physical Exam  Physical Exam  Constitutional:       Appearance: He is obese. He is ill-appearing.   HENT:      Mouth/Throat:      Mouth: Mucous membranes are moist.      Comments: Very poor oral hygiene, severe decay, black dental roots noted - oral thrush improving  Eyes:      Extraocular Movements: Extraocular movements intact.      Conjunctiva/sclera: Conjunctivae normal.      Pupils: Pupils are equal, round, and reactive to light.   Cardiovascular:      Rate and Rhythm: Normal rate and regular rhythm.      Pulses: Normal pulses.      Heart sounds: Normal heart sounds.   Pulmonary:      Effort: Pulmonary effort is normal.       Breath sounds: Normal breath sounds.   Abdominal:      General: Bowel sounds are normal.      Palpations: Abdomen is soft.      Tenderness: There is no abdominal tenderness. There is no rebound.      Comments: Obese   Musculoskeletal:      Cervical back: Normal range of motion and neck supple.      Right lower leg: No edema.      Left lower leg: No edema.   Skin:     General: Skin is warm.      Capillary Refill: Capillary refill takes less than 2 seconds.      Findings: Lesion present.      Comments: Multiple resolving tiny scabs, from scratching on arms, and legs   Neurological:      Mental Status: He is alert and oriented to person, place, and time. Mental status is at baseline.      Sensory: No sensory deficit.      Motor: No weakness.   Psychiatric:         Thought Content: Thought content normal.             Wounds: None  VAD: PIV  ISOLATION: None    CBC LAST 7 DAYS  Recent Labs   Lab 11/28/23  1835 11/29/23  1301 11/30/23  0421   WBC 17.31* 19.48*  19.48* 15.20*   RBC 3.72* 3.69*  3.69* 3.48*   HGB 12.6* 12.7*  12.7* 11.7*   HCT 38.3* 38.6*  38.6* 35.9*   * 105*  105* 103*   MCH 33.9* 34.4*  34.4* 33.6*   MCHC 32.9 32.9  32.9 32.6   RDW 13.5 13.7  13.7 13.8    361  361 353   MPV 9.8 9.8  9.8 9.9   GRAN 73.1*  12.7* 75.7*  75.7*  14.7*  14.7* 70.5  10.7*   LYMPH 11.7*  2.0 10.9*  10.9*  2.1  2.1 13.6*  2.1   MONO 9.0  1.6* 9.2  9.2  1.8*  1.8* 10.1  1.5*   BASO 0.12 0.08  0.08 0.10   NRBC 0 0  0 0       CHEMISTRY LAST 7 DAYS  Recent Labs   Lab 11/28/23  1835 11/29/23  1301 11/30/23  0421    136  136 136   K 4.3 3.9  3.9 4.2    104  104 104   CO2 24 26  26 25   ANIONGAP 7* 6*  6* 7*   BUN 16 13  13 19   CREATININE 1.4 1.2  1.2 1.4   GLU 91 92  92 92   CALCIUM 9.3 9.4  9.4 9.1   ALBUMIN 3.4* 3.6  --    PROT 8.3 8.4  --    ALKPHOS 60 64  --    ALT 20 18  --    AST 25 16  --    BILITOT 0.5 0.6  --        Estimated Creatinine Clearance: 80 mL/min (based  "on SCr of 1.4 mg/dL).    INFLAMMATORY/PROCAL  LAST 7 DAYS  Recent Labs   Lab 11/28/23  1835 11/30/23  0421 11/30/23  0835   PROCAL 5.349*  --  1.878*   CRP  --  61.6*  --      No results found for: "ESR"  CRP   Date Value Ref Range Status   11/30/2023 61.6 (H) 0.0 - 8.2 mg/L Final   11/22/2023 187.6 (H) 0.0 - 8.2 mg/L Final   11/20/2023 265.5 (H) 0.0 - 8.2 mg/L Final   11/17/2023 228.2 (H) 0.0 - 8.2 mg/L Final   07/25/2023 0.46 <0.76 mg/dL Final   04/06/2023 11.55 (H) <0.76 mg/dL Final   01/11/2023 20.88 (H) <0.76 mg/dL Final       PRIOR  MICROBIOLOGY:      LAST 7 DAYS MICROBIOLOGY   Microbiology Results (last 7 days)       Procedure Component Value Units Date/Time    Culture, Respiratory with Gram Stain [[id number]] Collected: 11/29/23 1118    Order Status: Completed Specimen: Respiratory from Sputum Updated: 11/30/23 1634     Respiratory Culture Reduced Normal Respiratory marilu     Gram Stain (Respiratory) <10 epithelial cells per low power field.     Gram Stain (Respiratory) Many WBC's     Gram Stain (Respiratory) Rare Gram negative rods    KOH prep [[id number]] Collected: 11/30/23 1153    Order Status: Completed Specimen: Biopsy from Bronchial Wash Updated: 11/30/23 1448     KOH Prep No yeast or fungal elements seen    Narrative:      CONRADO & LLL Biopsy    FANNY prep [[id number]] Collected: 11/30/23 1153    Order Status: Completed Specimen: Respiratory from Bronchial Wash Updated: 11/30/23 1438     KOH Prep No yeast or fungal elements seen    Narrative:      Bronchial Wash    FANNY prep [[id number]] Collected: 11/30/23 1153    Order Status: Completed Specimen: Respiratory from Bronchial Wash Updated: 11/30/23 1434     KOH Prep No yeast or fungal elements seen    Narrative:      Bronchial Marshall     AFB Culture & Smear [[id number]] Collected: 11/30/23 1153    Order Status: Sent Specimen: Respiratory from Bronchial Wash Updated: 11/30/23 1204    Fungus culture [[id number]] Collected: 11/30/23 1153    Order Status: Sent " Specimen: Respiratory from Bronchial Wash Updated: 11/30/23 1204    AFB Culture & Smear [[id number]] Collected: 11/30/23 1153    Order Status: Sent Specimen: Respiratory from Bronchial Wash Updated: 11/30/23 1204    Fungus culture [[id number]] Collected: 11/30/23 1153    Order Status: Sent Specimen: Respiratory from Bronchial Wash Updated: 11/30/23 1204    AFB Culture & Smear [[id number]] Collected: 11/30/23 1153    Order Status: Sent Specimen: Respiratory from Bronchial Wash Updated: 11/30/23 1203    Fungus culture [[id number]] Collected: 11/30/23 1153    Order Status: Sent Specimen: Respiratory from Bronchial Wash Updated: 11/30/23 1203    Culture, Respiratory with Gram Stain [[id number]] Collected: 11/30/23 1153    Order Status: Sent Specimen: Respiratory from Bronchial Wash Updated: 11/30/23 1203    Blood Culture #1 **CANNOT BE ORDERED STAT** [[id number]] Collected: 11/28/23 2010    Order Status: Completed Specimen: Blood from Antecubital, Right Updated: 11/29/23 2232     Blood Culture, Routine No Growth to date      No Growth to date    Blood Culture #2 **CANNOT BE ORDERED STAT** [[id number]] Collected: 11/28/23 2021    Order Status: Completed Specimen: Blood Updated: 11/29/23 2232     Blood Culture, Routine No Growth to date      No Growth to date    Clostridium difficile EIA [[id number]] Collected: 11/29/23 1118    Order Status: Completed Specimen: Stool Updated: 11/29/23 1453     C. diff Antigen Negative     C difficile Toxins A+B, EIA Negative     Comment: Testing not recommended for children <24 months old.       Respiratory Infection Panel (PCR), Nasopharyngeal [[id number]] Collected: 11/28/23 2258    Order Status: Completed Specimen: Nasopharyngeal Swab Updated: 11/29/23 0611     Respiratory Infection Panel Source NP swab     Adenovirus Not Detected     Coronavirus 229E, Common Cold Virus Not Detected     Coronavirus HKU1, Common Cold Virus Not Detected     Coronavirus NL63, Common Cold Virus Not  Detected     Coronavirus OC43, Common Cold Virus Not Detected     Comment: Coronavirus strains 229E, HKU1, NL63, and OC43 can cause the common   cold   and are not associated with the respiratory disease outbreak caused   by  the COVID-19 (SARS-CoV-2 novel Coronavirus) strain.           SARS-CoV2 (COVID-19) Qualitative PCR Not Detected     Human Metapneumovirus Not Detected     Human Rhinovirus/Enterovirus Not Detected     Influenza A (subtypes H1, H1-2009,H3) Not Detected     Influenza B Not Detected     Parainfluenza Virus 1 Not Detected     Parainfluenza Virus 2 Not Detected     Parainfluenza Virus 3 Not Detected     Parainfluenza Virus 4 Not Detected     Respiratory Syncytial Virus Not Detected     Bordetella Parapertussis (IP4717) Not Detected     Bordetella pertussis (ptxP) Not Detected     Chlamydia pneumoniae Not Detected     Mycoplasma pneumoniae Not Detected     Comment: Respiratory Infection Panel testing performed by Multiplex PCR.       Narrative:      Respiratory Infection Panel source->NP Swab    MRSA Screen by PCR [[id number]] Collected: 11/28/23 2258    Order Status: Completed Specimen: Nasopharyngeal Swab from Nasal Updated: 11/29/23 0210     MRSA SCREEN BY PCR Negative            SUSCEPTIBILITY  Susceptibility data from last 90 days.  Collected Specimen Info Organism   11/19/23 Blood No growth after 5 days.   11/18/23 Blood No growth after 5 days.   11/17/23 Blood from Antecubital, Right Arm No growth after 5 days.       CURRENT/PREVIOUS VISIT EKG  Results for orders placed or performed during the hospital encounter of 11/28/23   EKG 12-lead    Collection Time: 11/28/23  5:18 PM    Narrative    Test Reason : R06.02,    Vent. Rate : 087 BPM     Atrial Rate : 340 BPM     P-R Int : 000 ms          QRS Dur : 078 ms      QT Int : 374 ms       P-R-T Axes : 046 021 036 degrees     QTc Int : 450 ms    Atrial flutter with 4:1 A-V conduction  Abnormal ECG  When compared with ECG of 19-NOV-2023  07:14,  Atrial flutter has replaced Sinus rhythm    Referred By: [email or web address]   SELF           Confirmed By:        Significant Labs: All pertinent labs within the past 24 hours have been reviewed.     Significant Imaging: I have reviewed all relevant and available imaging results/findings within the past 24 hours.    CXR latest: stable small apical pneumothorax       CT chest:   1.  Interval development of multiple areas of nodular consolidation throughout the lung parenchyma, some of which have central cavitation. Findings are suggestive of septic emboli. Necrotizing pneumonia could also have this appearance   2.  Hepatosplenomegaly with fatty infiltration of the liver.   3.  Small nonobstructing kidney stones bilaterally.   .       Ophelia Galindo MD  Date of Service: 11/30/2023  5:00 PM

## 2023-11-30 NOTE — PLAN OF CARE
11/30/23 0741   Patient Assessment/Suction   Level of Consciousness (AVPU) alert   Respiratory Effort Normal;Unlabored   Expansion/Accessory Muscles/Retractions no use of accessory muscles   Rhythm/Pattern, Respiratory pattern regular   PRE-TX-O2   Device (Oxygen Therapy) room air   SpO2 (!) 94 %   Pulse Oximetry Type Intermittent   $ Pulse Oximetry - Single Charge Pulse Oximetry - Single   Pulse 79   Resp 18   Education   $ Education 15 min  (SpO2)

## 2023-11-30 NOTE — ANESTHESIA PREPROCEDURE EVALUATION
11/30/2023  Jacoby Jean-Baptiste is a 48 y.o., male.           Tobacco Use:  The patient  reports that he has been smoking cigarettes. He has a 30.0 pack-year smoking history. He has never used smokeless tobacco.     Results for orders placed or performed during the hospital encounter of 11/28/23   EKG 12-lead    Collection Time: 11/28/23  5:18 PM    Narrative    Test Reason : R06.02,    Vent. Rate : 087 BPM     Atrial Rate : 340 BPM     P-R Int : 000 ms          QRS Dur : 078 ms      QT Int : 374 ms       P-R-T Axes : 046 021 036 degrees     QTc Int : 450 ms    Atrial flutter with 4:1 A-V conduction  Abnormal ECG  When compared with ECG of 19-NOV-2023 07:14,  Atrial flutter has replaced Sinus rhythm    Referred By: [email or web address]   SELF           Confirmed By:         Imaging Results              CT Chest Abdomen Pelvis With IV Contrast (XPD) Routine Oral Contrast (Final result)  Result time 11/29/23 00:51:18      Final result by Blanca Sykes MD (11/29/23 00:51:18)                   Narrative:    EXAM DESCRIPTION:  CT CHEST ABDOMEN PELVIS WITH IV CONTRAST (XPD)    CLINICAL HISTORY:  48 years  Male  Sepsis    TECHNIQUE:  CT chest, abdomen, pelvis protocol with intravenous [contrast. No oral contrast was utilized..  All CT scans at this facility use dose modulation, iterative reconstruction, and/or weight based dosing when appropriate to reduce radiation dose to as low as reasonably achievable.    COMPARISON: Portable view of the chest obtained earlier in the day for 50 8:00 PM. CT angiogram of the chest as well as CT scan of the abdomen and pelvis November 17, 2023    FINDINGS:    Chest:  Lungs: No pneumothorax or pleural effusion. There is been development of multiple areas of nodular consolidation throughout the lung parenchyma, some of which have central cavitation. Findings are suggestive of septic emboli. The  largest area of consolidation is present in the lingula. Diffuse airway thickening is seen. The trachea is patent.  Mediastinum: Heart size is within normal limits. No pericardial abnormality. Mediastinal lymphadenopathy is again seen and is more pronounced on today's exam. Lymph nodes measure up to 1.8 cm and are present in the pretracheal, prevascular, AP window and subcarinal region. Esophagus is unremarkable. Mediastinal  Vascular: Minimal calcified plaque in the aorta. No aneurysm. The right and left main pulmonary arteries are patent. Distal to this the vessels are not well seen.  Bones: Soft tissues of the chest wall are unremarkable. No acute osseous abnormality.    Abdomen:  Liver and Biliary tree:Diffuse fatty infiltration of the liver. The gallbladder is surgically absent. Portal vein branches are patent. Liver is enlarged measuring 22 cm in length.  Pancreas:Unremarkable  Spleen:Spleen is enlarged measuring 14.1 cm.  Kidneys:  Small nonobstructing kidney stones are seen bilaterally. There is symmetric renal enhancement. No perinephric abnormality.  Adrenal glands:Within normal limits  Vascular structures:No occlusion or stenosis. No retroperitoneal hematoma.  Retroperitoneum:  There is areas of actual calcification within the omental fat predominantly in the central low abdomen are unchanged. This may represent areas of fat necrosis.  Abdominal Wall:  Normal  GI:  Bowel is of normal caliber.  No obstruction.  No focal bowel wall thickening.  Appendix: The appendix is not seen with confidence.  General: No free air. No free fluid.    Pelvis:  Lymph nodes:  No pelvic mass or adenopathy.  Organs:  Bladder appears normal  Bones  unchanged.    IMPRESSION:  1.  Interval development of multiple areas of nodular consolidation throughout the lung parenchyma, some of which have central cavitation. Findings are suggestive of septic emboli. Necrotizing pneumonia could also have this appearance  2.   Hepatosplenomegaly with fatty infiltration of the liver.  3.  Small nonobstructing kidney stones bilaterally.  .    Electronically signed by:  Blanca Sykes MD  11/29/2023 12:51 AM CST Workstation: [id number]                                     X-Ray Chest AP (Final result)  Result time 11/28/23 17:46:57      Final result by Adrian Hurtado Jr., MD (11/28/23 17:46:57)                   Narrative:    HISTORY: SOB    COMPARISON:Comparison made with patient's previous imaging study of 11/23/2023.    FINDINGS:Aggregate interstitial markings are established bases hilar/infrahilar region projecting into the left lower lobe with marked improvement upon comparison. Resolution of interstitial markings elsewhere throughout the bilateral lungs. There is no evidence of pneumothorax. The mediastinal and hilar contours are well-maintained. The heart is normal in size. No significant pleural effusion. The osseous structures show nothing unusual.    IMPRESSION:Improvement upon comparison with resolving interstitial and left lower lobe nodular alveolar opacities with nominal residual.    Electronically signed by:  Adrian Hurtado MD  11/28/2023 05:46 PM CST Workstation: [id number]                                     Lab Results   Component Value Date    WBC 15.20 (H) 11/30/2023    HGB 11.7 (L) 11/30/2023    HCT 35.9 (L) 11/30/2023     (H) 11/30/2023     11/30/2023     BMP  Lab Results   Component Value Date     11/30/2023    K 4.2 11/30/2023     11/30/2023    CO2 25 11/30/2023    BUN 19 11/30/2023    CREATININE 1.4 11/30/2023    CALCIUM 9.1 11/30/2023    ANIONGAP 7 (L) 11/30/2023    GLU 92 11/30/2023    GLU 92 11/29/2023    GLU 92 11/29/2023       Results for orders placed during the hospital encounter of 11/17/23    Echo    Interpretation Summary    Left Ventricle: The left ventricle is normal in size. Normal wall thickness. There is concentric remodeling. Normal wall motion. There is normal systolic  function with a visually estimated ejection fraction of 55 - 60%. There is normal diastolic function.    Right Ventricle: Normal right ventricular cavity size. Wall thickness is normal. Right ventricle wall motion  is normal. Systolic function is normal.    IVC/SVC: Normal venous pressure at 3 mmHg.         Pre-op Assessment    I have reviewed the Patient Summary Reports.     I have reviewed the Nursing Notes. I have reviewed the NPO Status.   I have reviewed the Medications.     Review of Systems  Anesthesia Hx:  No problems with previous Anesthesia             Denies Family Hx of Anesthesia complications.    Denies Personal Hx of Anesthesia complications.                    Social:  Alcohol Use, Smoker Admission tox screen positive for opiates and marijuana      Hematology/Oncology:    Oncology Normal    -- Anemia:                                  EENT/Dental:  EENT/Dental Normal           Cardiovascular:     Hypertension, well controlled    Dysrhythmias       hyperlipidemia   ECG has been reviewed. H/O cardiac radiofrequency ablation for supraventricular tachycardia.      Shortness of Breath                    Hypertension         Pulmonary:  Pneumonia (bilateral)    Shortness of breath   Denies Sleep Apnea. Possible Septic embolism to the lungs.      Multilobar nodular infiltrate with the left lower lobe nodule cavitating.       Pulmonary Symptoms:  are productive cough and wheezing.                 Renal/:  Chronic Renal Disease renal calculi       Kidney Function/Disease             Hepatic/GI:   PUD,     Ulcerative colitis.  Ozempic Therapy, last 11/11/2023.  Patient denies any nausea or vomiting today or yesterday.    Peptic Ulcer Disease   Bowel Conditions:  Inflammatory Bowel Disease, Ulcerative Colitis        Musculoskeletal:  Musculoskeletal Normal                Neurological:    Denies CVA.        Left-sided weakness per chart but patient denies                            Endocrine:  Diabetes, poorly  controlled, type 2    Diabetes                    Morbid Obesity / BMI > 40  Psych:  Psychiatric History anxiety  Sleep Disorder and Insomnia.       Sleep Disorder and Insomnia.        Physical Exam  General: Cooperative, Alert and Oriented    Airway:  Mallampati: IV / III  Mouth Opening: Normal  TM Distance: > 6 cm  Tongue: Normal  Neck ROM: Normal ROM    Dental:    Chest/Lungs:  Clear to auscultation, Normal Respiratory Rate    Heart:  Rate: Normal  Rhythm: Regular Rhythm  Sounds: Normal        Anesthesia Plan  Type of Anesthesia, risks & benefits discussed:    Anesthesia Type: Gen Natural Airway  Intra-op Monitoring Plan: Standard ASA Monitors  Induction:  IV  ASA Score: 4  Anesthesia Plan Notes:     GNA  Procedural oxygen mask  Propofol   TB precautions    Ready For Surgery From Anesthesia Perspective.     .

## 2023-12-01 ENCOUNTER — CLINICAL SUPPORT (OUTPATIENT)
Dept: SMOKING CESSATION | Facility: CLINIC | Age: 48
End: 2023-12-01
Payer: COMMERCIAL

## 2023-12-01 DIAGNOSIS — Z87.891 FORMER SMOKER, STOPPED SMOKING IN DISTANT PAST: Primary | ICD-10-CM

## 2023-12-01 PROBLEM — I76 SEPTIC EMBOLISM: Status: ACTIVE | Noted: 2023-12-01

## 2023-12-01 PROBLEM — D72.829 LEUCOCYTOSIS: Status: RESOLVED | Noted: 2023-01-11 | Resolved: 2023-12-01

## 2023-12-01 LAB
ALBUMIN SERPL BCP-MCNC: 3.3 G/DL (ref 3.5–5.2)
ALP SERPL-CCNC: 54 U/L (ref 55–135)
ALT SERPL W/O P-5'-P-CCNC: 14 U/L (ref 10–44)
ANION GAP SERPL CALC-SCNC: 8 MMOL/L (ref 8–16)
AST SERPL-CCNC: 16 U/L (ref 10–40)
BACTERIA SPEC AEROBE CULT: NORMAL
BASOPHILS # BLD AUTO: 0.1 K/UL (ref 0–0.2)
BASOPHILS NFR BLD: 0.8 % (ref 0–1.9)
BILIRUB SERPL-MCNC: 0.5 MG/DL (ref 0.1–1)
BUN SERPL-MCNC: 26 MG/DL (ref 6–20)
CALCIUM SERPL-MCNC: 9.3 MG/DL (ref 8.7–10.5)
CHLORIDE SERPL-SCNC: 106 MMOL/L (ref 95–110)
CO2 SERPL-SCNC: 24 MMOL/L (ref 23–29)
CREAT SERPL-MCNC: 1.6 MG/DL (ref 0.5–1.4)
DIFFERENTIAL METHOD: ABNORMAL
EOSINOPHIL # BLD AUTO: 0.5 K/UL (ref 0–0.5)
EOSINOPHIL NFR BLD: 4.1 % (ref 0–8)
ERYTHROCYTE [DISTWIDTH] IN BLOOD BY AUTOMATED COUNT: 13.8 % (ref 11.5–14.5)
EST. GFR  (NO RACE VARIABLE): 52.5 ML/MIN/1.73 M^2
FOLATE SERPL-MCNC: 6.8 NG/ML (ref 4–24)
GLUCOSE SERPL-MCNC: 114 MG/DL (ref 70–110)
GLUCOSE SERPL-MCNC: 119 MG/DL (ref 70–110)
GLUCOSE SERPL-MCNC: 60 MG/DL (ref 70–110)
GLUCOSE SERPL-MCNC: 74 MG/DL (ref 70–110)
GLUCOSE SERPL-MCNC: 91 MG/DL (ref 70–110)
GRAM STN SPEC: NORMAL
HCT VFR BLD AUTO: 38 % (ref 40–54)
HGB BLD-MCNC: 11.9 G/DL (ref 14–18)
IMM GRANULOCYTES # BLD AUTO: 0.11 K/UL (ref 0–0.04)
IMM GRANULOCYTES NFR BLD AUTO: 0.9 % (ref 0–0.5)
LABCORP MISC TEST CODE: NORMAL
LABCORP MISC TEST NAME: NORMAL
LABCORP MISCELLANEOUS TEST: NORMAL
LYMPHOCYTES # BLD AUTO: 2.3 K/UL (ref 1–4.8)
LYMPHOCYTES NFR BLD: 18.5 % (ref 18–48)
MAGNESIUM SERPL-MCNC: 1.5 MG/DL (ref 1.6–2.6)
MCH RBC QN AUTO: 33.3 PG (ref 27–31)
MCHC RBC AUTO-ENTMCNC: 31.3 G/DL (ref 32–36)
MCV RBC AUTO: 106 FL (ref 82–98)
MONOCYTES # BLD AUTO: 1.1 K/UL (ref 0.3–1)
MONOCYTES NFR BLD: 8.9 % (ref 4–15)
NEUTROPHILS # BLD AUTO: 8.3 K/UL (ref 1.8–7.7)
NEUTROPHILS NFR BLD: 66.8 % (ref 38–73)
NRBC BLD-RTO: 0 /100 WBC
PLATELET # BLD AUTO: 366 K/UL (ref 150–450)
PMV BLD AUTO: 10.2 FL (ref 9.2–12.9)
POTASSIUM SERPL-SCNC: 4.4 MMOL/L (ref 3.5–5.1)
PROT SERPL-MCNC: 7.6 G/DL (ref 6–8.4)
RBC # BLD AUTO: 3.57 M/UL (ref 4.6–6.2)
SODIUM SERPL-SCNC: 138 MMOL/L (ref 136–145)
VIT B12 SERPL-MCNC: 1178 PG/ML (ref 210–950)
WBC # BLD AUTO: 12.37 K/UL (ref 3.9–12.7)

## 2023-12-01 PROCEDURE — 36415 COLL VENOUS BLD VENIPUNCTURE: CPT | Performed by: INTERNAL MEDICINE

## 2023-12-01 PROCEDURE — 99223 PR INITIAL HOSPITAL CARE,LEVL III: ICD-10-PCS | Mod: ,,, | Performed by: INTERNAL MEDICINE

## 2023-12-01 PROCEDURE — 82746 ASSAY OF FOLIC ACID SERUM: CPT | Performed by: INTERNAL MEDICINE

## 2023-12-01 PROCEDURE — 12000002 HC ACUTE/MED SURGE SEMI-PRIVATE ROOM

## 2023-12-01 PROCEDURE — 99900035 HC TECH TIME PER 15 MIN (STAT)

## 2023-12-01 PROCEDURE — 94760 N-INVAS EAR/PLS OXIMETRY 1: CPT

## 2023-12-01 PROCEDURE — 25000003 PHARM REV CODE 250: Performed by: STUDENT IN AN ORGANIZED HEALTH CARE EDUCATION/TRAINING PROGRAM

## 2023-12-01 PROCEDURE — 99233 PR SUBSEQUENT HOSPITAL CARE,LEVL III: ICD-10-PCS | Mod: FS,,, | Performed by: NURSE PRACTITIONER

## 2023-12-01 PROCEDURE — 99223 1ST HOSP IP/OBS HIGH 75: CPT | Mod: ,,, | Performed by: INTERNAL MEDICINE

## 2023-12-01 PROCEDURE — 83735 ASSAY OF MAGNESIUM: CPT | Performed by: INTERNAL MEDICINE

## 2023-12-01 PROCEDURE — 99406 BEHAV CHNG SMOKING 3-10 MIN: CPT

## 2023-12-01 PROCEDURE — 99406 PT REFUSED TOBACCO CESSATION: ICD-10-PCS | Mod: S$GLB,,, | Performed by: INTERNAL MEDICINE

## 2023-12-01 PROCEDURE — 94799 UNLISTED PULMONARY SVC/PX: CPT

## 2023-12-01 PROCEDURE — 99233 SBSQ HOSP IP/OBS HIGH 50: CPT | Mod: FS,,, | Performed by: NURSE PRACTITIONER

## 2023-12-01 PROCEDURE — 25000003 PHARM REV CODE 250: Performed by: ANESTHESIOLOGY

## 2023-12-01 PROCEDURE — 25000003 PHARM REV CODE 250: Performed by: INTERNAL MEDICINE

## 2023-12-01 PROCEDURE — 94761 N-INVAS EAR/PLS OXIMETRY MLT: CPT

## 2023-12-01 PROCEDURE — 99232 PR SUBSEQUENT HOSPITAL CARE,LEVL II: ICD-10-PCS | Mod: 25,,, | Performed by: INTERNAL MEDICINE

## 2023-12-01 PROCEDURE — 85025 COMPLETE CBC W/AUTO DIFF WBC: CPT | Performed by: INTERNAL MEDICINE

## 2023-12-01 PROCEDURE — 82607 VITAMIN B-12: CPT | Performed by: INTERNAL MEDICINE

## 2023-12-01 PROCEDURE — 99406 BEHAV CHNG SMOKING 3-10 MIN: CPT | Mod: S$GLB,,, | Performed by: INTERNAL MEDICINE

## 2023-12-01 PROCEDURE — 63600175 PHARM REV CODE 636 W HCPCS: Performed by: INTERNAL MEDICINE

## 2023-12-01 PROCEDURE — 99232 SBSQ HOSP IP/OBS MODERATE 35: CPT | Mod: 25,,, | Performed by: INTERNAL MEDICINE

## 2023-12-01 PROCEDURE — 99900031 HC PATIENT EDUCATION (STAT)

## 2023-12-01 PROCEDURE — 80053 COMPREHEN METABOLIC PANEL: CPT | Performed by: INTERNAL MEDICINE

## 2023-12-01 PROCEDURE — 63600175 PHARM REV CODE 636 W HCPCS: Performed by: STUDENT IN AN ORGANIZED HEALTH CARE EDUCATION/TRAINING PROGRAM

## 2023-12-01 RX ORDER — MAGNESIUM SULFATE HEPTAHYDRATE 40 MG/ML
2 INJECTION, SOLUTION INTRAVENOUS ONCE
Status: COMPLETED | OUTPATIENT
Start: 2023-12-01 | End: 2023-12-01

## 2023-12-01 RX ORDER — ENOXAPARIN SODIUM 100 MG/ML
40 INJECTION SUBCUTANEOUS EVERY 12 HOURS
Status: DISCONTINUED | OUTPATIENT
Start: 2023-12-01 | End: 2023-12-04 | Stop reason: HOSPADM

## 2023-12-01 RX ADMIN — DICYCLOMINE HYDROCHLORIDE 10 MG: 10 CAPSULE ORAL at 09:12

## 2023-12-01 RX ADMIN — ENOXAPARIN SODIUM 40 MG: 40 INJECTION SUBCUTANEOUS at 10:12

## 2023-12-01 RX ADMIN — CHLORHEXIDINE GLUCONATE 15 ML: 1.2 RINSE ORAL at 09:12

## 2023-12-01 RX ADMIN — DICYCLOMINE HYDROCHLORIDE 10 MG: 10 CAPSULE ORAL at 10:12

## 2023-12-01 RX ADMIN — VORICONAZOLE 200 MG: 200 TABLET, FILM COATED ORAL at 09:12

## 2023-12-01 RX ADMIN — OXYCODONE AND ACETAMINOPHEN 1 TABLET: 5; 325 TABLET ORAL at 10:12

## 2023-12-01 RX ADMIN — METOPROLOL SUCCINATE 50 MG: 50 TABLET, EXTENDED RELEASE ORAL at 09:12

## 2023-12-01 RX ADMIN — PIPERACILLIN AND TAZOBACTAM 4.5 G: 4; .5 INJECTION, POWDER, LYOPHILIZED, FOR SOLUTION INTRAVENOUS at 10:12

## 2023-12-01 RX ADMIN — DICYCLOMINE HYDROCHLORIDE 10 MG: 10 CAPSULE ORAL at 03:12

## 2023-12-01 RX ADMIN — MAGNESIUM SULFATE HEPTAHYDRATE 2 G: 40 INJECTION, SOLUTION INTRAVENOUS at 09:12

## 2023-12-01 RX ADMIN — OXYCODONE HYDROCHLORIDE 5 MG: 5 TABLET ORAL at 03:12

## 2023-12-01 RX ADMIN — SERTRALINE HYDROCHLORIDE 100 MG: 50 TABLET ORAL at 09:12

## 2023-12-01 RX ADMIN — BUSPIRONE HYDROCHLORIDE 7.5 MG: 5 TABLET ORAL at 03:12

## 2023-12-01 RX ADMIN — DIPHENHYDRAMINE HYDROCHLORIDE 25 MG: 25 CAPSULE ORAL at 05:12

## 2023-12-01 RX ADMIN — VORICONAZOLE 200 MG: 200 TABLET, FILM COATED ORAL at 10:12

## 2023-12-01 RX ADMIN — COLESEVELAM HYDROCHLORIDE 1875 MG: 625 TABLET, COATED ORAL at 04:12

## 2023-12-01 RX ADMIN — COLESEVELAM HYDROCHLORIDE 1875 MG: 625 TABLET, COATED ORAL at 09:12

## 2023-12-01 RX ADMIN — PIPERACILLIN AND TAZOBACTAM 4.5 G: 4; .5 INJECTION, POWDER, LYOPHILIZED, FOR SOLUTION INTRAVENOUS at 05:12

## 2023-12-01 RX ADMIN — DIPHENHYDRAMINE HYDROCHLORIDE 25 MG: 25 CAPSULE ORAL at 11:12

## 2023-12-01 RX ADMIN — CHLORHEXIDINE GLUCONATE 15 ML: 1.2 RINSE ORAL at 10:12

## 2023-12-01 RX ADMIN — PIPERACILLIN AND TAZOBACTAM 4.5 G: 4; .5 INJECTION, POWDER, LYOPHILIZED, FOR SOLUTION INTRAVENOUS at 01:12

## 2023-12-01 RX ADMIN — OXYCODONE HYDROCHLORIDE 5 MG: 5 TABLET ORAL at 02:12

## 2023-12-01 RX ADMIN — BUSPIRONE HYDROCHLORIDE 7.5 MG: 5 TABLET ORAL at 10:12

## 2023-12-01 RX ADMIN — OXYCODONE AND ACETAMINOPHEN 1 TABLET: 5; 325 TABLET ORAL at 09:12

## 2023-12-01 RX ADMIN — PANTOPRAZOLE SODIUM 40 MG: 40 TABLET, DELAYED RELEASE ORAL at 10:12

## 2023-12-01 RX ADMIN — ZOLPIDEM TARTRATE 10 MG: 5 TABLET, COATED ORAL at 10:12

## 2023-12-01 RX ADMIN — VANCOMYCIN HYDROCHLORIDE 125 MG: KIT at 09:12

## 2023-12-01 RX ADMIN — PANTOPRAZOLE SODIUM 40 MG: 40 TABLET, DELAYED RELEASE ORAL at 09:12

## 2023-12-01 RX ADMIN — VANCOMYCIN HYDROCHLORIDE 125 MG: KIT at 10:12

## 2023-12-01 RX ADMIN — BUSPIRONE HYDROCHLORIDE 7.5 MG: 5 TABLET ORAL at 09:12

## 2023-12-01 NOTE — PROGRESS NOTES
12/01/23 1015   Tobacco Cessation Intervention   Do you use any type of tobacco product? No  (Pt states he just quit tobacco 3 weeks ago)   Are you interested in quitting use of tobacco products? Ready to quit   Are you interested in Nicotine Replacement for symptom relief? No   $ Smoking Cessation Charges Smoking Cessation - Intermediate (Non-CTTS)

## 2023-12-01 NOTE — PROGRESS NOTES
Pulmonary/Critical Care Consult      PATIENT NAME: Jacoby Jean-Baptiste  MRN: [id number]  TODAY'S DATE: 2023  12:55 PM  ADMIT DATE: 2023  AGE: 48 y.o. : 1975    CONSULT REQUESTED BY: Moon Armas MD    REASON FOR CONSULT:   Cavitary pneumonia, progressive    HPI:  The patient is a 48-year-old male who returned to the emergency room because he was not feeling any better.  He had been hospitalized earlier in the month with a multilobar nodular infiltrate with the left lower lobe nodule cavitating.  He underwent bronchoscopy which was unrevealing.  His blood cultures were negative.  In the interim since he was discharged, he has not felt any better despite completing his antibiotics.  Not clearing any mucus  He is not running fever.  He has an Aspergillus antigen that is positive at 1.16.  The patient has been immunosuppressed with Remicade for his ulcerative colitis.  He has not had that in weeks now.  His CT on presentation is significantly worse this time than last time with a left-sided predominance.      the patient underwent bronchoscopy this morning.  Unfortunately, he has a small apical pneumothorax.  He is currently on 2 L of oxygen.  Good specimens were obtained.  The patient remains quite stable.     the patient is still in bed this morning.  His chest x-ray shows that his pneumothorax is very very tiny and apical.  His microbiology is negative from his bronch yesterday to date.  His cytology and histopathology results have not been released.  He is saturating 95% on room air.  His creatinine is a little higher today.    REVIEW OF SYSTEMS  GENERAL: Feeling poorly  EYES: Vision is good.  ENT: No sinusitis or pharyngitis.   HEART: No chest pain or palpitations.  LUNGS:  He coughs but does not clear any mucus  GI: No Nausea, vomiting, constipation, diarrhea, or reflux.  : No dysuria, hesitancy, or nocturia.  SKIN: No lesions or rashes.  MUSCULOSKELETAL: No joint pain or  "myalgias.  NEURO: No headaches or neuropathy.  LYMPH: No edema or adenopathy.  PSYCH: No anxiety or depression.  ENDO: No weight change.    No change in the patient's Past Medical History, Past Surgical History, Social History or Family History since admission.      VITAL SIGNS (MOST RECENT)  Temp: 97.9 °F (36.6 °C) (12/01/23 0623)  Pulse: 73 (12/01/23 0623)  Resp: 16 (12/01/23 0623)  BP: (!) 92/57 (12/01/23 0623)  SpO2: 95 % (12/01/23 0623)    INTAKE AND OUTPUT (LAST 24 HOURS):  Intake/Output Summary (Last 24 hours) at 12/1/2023 0642  Last data filed at 11/30/2023 1159  Gross per 24 hour   Intake 770 ml   Output --   Net 770 ml       WEIGHT  Wt Readings from Last 1 Encounters:   11/30/23 129.3 kg (285 lb)       PHYSICAL EXAM  GENERAL:  Mid aged patient in no distress.    HEENT: Pupils equal and reactive. Extraocular movements intact. Nose intact. Pharynx moist.  NECK: Supple.   HEART: Regular rate and rhythm. No murmur or gallop auscultated.  LUNGS:  The lungs are clear to auscultation and percussion bilaterally Lung excursion symmetrical. No change in fremitus. No adventitial noises.  ABDOMEN: Bowel sounds present. Non-tender, no masses palpated.  : Normal anatomy.  EXTREMITIES: Normal muscle tone and joint movement, no cyanosis or clubbing.   LYMPHATICS: No adenopathy palpated, no edema.  SKIN: Dry, intact, no lesions.   NEURO: Cranial nerves II-XII intact. Motor strength 5/5 bilaterally, upper and lower extremities.  PSYCH: Appropriate affect      CBC LAST (LAST 24 HOURS)  Recent Labs   Lab 12/01/23  0601   WBC 12.37   RBC 3.57*   HGB 11.9*   HCT 38.0*   *   MCH 33.3*   MCHC 31.3*   RDW 13.8      MPV 10.2   GRAN 66.8  8.3*   LYMPH 18.5  2.3   MONO 8.9  1.1*   BASO 0.10   NRBC 0       CHEMISTRY LAST (LAST 24 HOURS)  No results for input(s): "NA", "K", "CL", "CO2", "ANIONGAP", "BUN", "CREATININE", "GLU", "CALCIUM", "PH", "MG", "ALBUMIN", "PROT", "ALKPHOS", "ALT", "AST", "BILITOT" in the last 24 " hours.        CARDIAC PROFILE (LAST 24 HOURS)  Recent Labs   Lab 11/28/23  1835   BNP 7   TROPONINIHS 3.5       LAST 7 DAYS MICROBIOLOGY   Microbiology Results (last 7 days)       Procedure Component Value Units Date/Time    Blood Culture #2 **CANNOT BE ORDERED STAT** [[id number]] Collected: 11/28/23 2021    Order Status: Completed Specimen: Blood Updated: 11/30/23 2232     Blood Culture, Routine No Growth to date      No Growth to date      No Growth to date    Blood Culture #1 **CANNOT BE ORDERED STAT** [[id number]] Collected: 11/28/23 2010    Order Status: Completed Specimen: Blood from Antecubital, Right Updated: 11/30/23 2232     Blood Culture, Routine No Growth to date      No Growth to date      No Growth to date    Culture, Respiratory with Gram Stain [[id number]] Collected: 11/30/23 1153    Order Status: Completed Specimen: Respiratory from Bronchial Wash Updated: 11/30/23 2042     Gram Stain (Respiratory) <10 epithelial cells per low power field.     Gram Stain (Respiratory) Many WBC's     Gram Stain (Respiratory) Rare Gram positive cocci    Narrative:      Bronchial Wash    Culture, Respiratory with Gram Stain [[id number]] Collected: 11/29/23 1118    Order Status: Completed Specimen: Respiratory from Sputum Updated: 11/30/23 1634     Respiratory Culture Reduced Normal Respiratory marilu     Gram Stain (Respiratory) <10 epithelial cells per low power field.     Gram Stain (Respiratory) Many WBC's     Gram Stain (Respiratory) Rare Gram negative rods    KOH prep [[id number]] Collected: 11/30/23 1153    Order Status: Completed Specimen: Biopsy from Bronchial Wash Updated: 11/30/23 1448     KOH Prep No yeast or fungal elements seen    Narrative:      CONRADO & LLL Biopsy    FANNY prep [[id number]] Collected: 11/30/23 1153    Order Status: Completed Specimen: Respiratory from Bronchial Wash Updated: 11/30/23 1438     KOH Prep No yeast or fungal elements seen    Narrative:      Bronchial Wash    FANNY prep [[id number]]  Collected: 11/30/23 1153    Order Status: Completed Specimen: Respiratory from Bronchial Wash Updated: 11/30/23 1434     KOH Prep No yeast or fungal elements seen    Narrative:      Bronchial [place]     AFB Culture & Smear [[id number]] Collected: 11/30/23 1153    Order Status: Sent Specimen: Respiratory from Bronchial Wash Updated: 11/30/23 1204    Fungus culture [[id number]] Collected: 11/30/23 1153    Order Status: Sent Specimen: Respiratory from Bronchial Wash Updated: 11/30/23 1204    AFB Culture & Smear [[id number]] Collected: 11/30/23 1153    Order Status: Sent Specimen: Respiratory from Bronchial Wash Updated: 11/30/23 1204    Fungus culture [[id number]] Collected: 11/30/23 1153    Order Status: Sent Specimen: Respiratory from Bronchial Wash Updated: 11/30/23 1204    AFB Culture & Smear [[id number]] Collected: 11/30/23 1153    Order Status: Sent Specimen: Respiratory from Bronchial Wash Updated: 11/30/23 1203    Fungus culture [[id number]] Collected: 11/30/23 1153    Order Status: Sent Specimen: Respiratory from Bronchial Wash Updated: 11/30/23 1203    Clostridium difficile EIA [[id number]] Collected: 11/29/23 1118    Order Status: Completed Specimen: Stool Updated: 11/29/23 1453     C. diff Antigen Negative     C difficile Toxins A+B, EIA Negative     Comment: Testing not recommended for children <24 months old.       Respiratory Infection Panel (PCR), Nasopharyngeal [[id number]] Collected: 11/28/23 2258    Order Status: Completed Specimen: Nasopharyngeal Swab Updated: 11/29/23 0611     Respiratory Infection Panel Source NP swab     Adenovirus Not Detected     Coronavirus 229E, Common Cold Virus Not Detected     Coronavirus HKU1, Common Cold Virus Not Detected     Coronavirus NL63, Common Cold Virus Not Detected     Coronavirus OC43, Common Cold Virus Not Detected     Comment: Coronavirus strains 229E, HKU1, NL63, and OC43 can cause the common   cold   and are not associated with the respiratory disease  outbreak caused   by  the COVID-19 (SARS-CoV-2 novel Coronavirus) strain.           SARS-CoV2 (COVID-19) Qualitative PCR Not Detected     Human Metapneumovirus Not Detected     Human Rhinovirus/Enterovirus Not Detected     Influenza A (subtypes H1, H1-2009,H3) Not Detected     Influenza B Not Detected     Parainfluenza Virus 1 Not Detected     Parainfluenza Virus 2 Not Detected     Parainfluenza Virus 3 Not Detected     Parainfluenza Virus 4 Not Detected     Respiratory Syncytial Virus Not Detected     Bordetella Parapertussis (US5453) Not Detected     Bordetella pertussis (ptxP) Not Detected     Chlamydia pneumoniae Not Detected     Mycoplasma pneumoniae Not Detected     Comment: Respiratory Infection Panel testing performed by Multiplex PCR.       Narrative:      Respiratory Infection Panel source->NP Swab    MRSA Screen by PCR [[id number]] Collected: 11/28/23 2258    Order Status: Completed Specimen: Nasopharyngeal Swab from Nasal Updated: 11/29/23 0210     MRSA SCREEN BY PCR Negative            MOST RECENT IMAGING  X-Ray Chest 1 View  Chest, single view.    HISTORY: Pneumothorax.    Comparison is made to an earlier examination.    The previously described small left apical pneumothorax has decreased slightly in size in the interval.    Patchy airspace opacities in the left lung remain unchanged. The right lung is appropriately expanded and clear. There is no significant effusion. The heart size and pulmonary vasculature are stable.    IMPRESSION:    Slight interval decrease in size of tiny left apical pneumothorax.    Unchanged patchy left-sided airspace opacities.    Electronically signed by:  Kelton Wells MD  11/30/2023 03:56 PM CST Workstation: [id number]  X-Ray Chest 1 View  Reason: Post bronch    FINDINGS:    Portable chest with comparison chest x-ray November 28, 2023 show normal cardiomediastinal silhouette. There is a small apical left pneumothorax.  Interstitial opacities of the bilateral lungs noted  with patchy airspace opacities of the left lung base. Pulmonary vasculature is normal. No acute osseous abnormality.    IMPRESSION:    1.  Small apical left pneumothorax. Findings reported to Dr. Lisa Villarreal at 11:19 AM.  2.  Bilateral interstitial lung opacities opacities of the left lung base.    Electronically signed by:  Charles Samuel ATWOOD  11/30/2023 11:20 AM CST Workstation: [id number]  FL Flouro Usage  See Notes    This procedure was auto-finalized.      CURRENT VISIT EKG  No results found for this visit on 11/28/23.    ECHOCARDIOGRAM RESULTS  Results for orders placed during the hospital encounter of 11/17/23    Echo    Interpretation Summary    Left Ventricle: The left ventricle is normal in size. Normal wall thickness. There is concentric remodeling. Normal wall motion. There is normal systolic function with a visually estimated ejection fraction of 55 - 60%. There is normal diastolic function.    Right Ventricle: Normal right ventricular cavity size. Wall thickness is normal. Right ventricle wall motion  is normal. Systolic function is normal.    IVC/SVC: Normal venous pressure at 3 mmHg.      The patient is on room air         IMPRESSION AND PLAN  Progressive pneumonic findings, patchy and in some places cavitating.  Mostly on the left  Positive aspergillus antigen, negative fungal smear and cultures to date  Procalcitonin positive and Aspergillus is not known to do this.  Leukocytosis worsening   Prior workup with ANCAs and anti-glomerular membrane antibodies were negative  Minimally positive   Prior KATJA negative  Ulcerative colitis on Remicade but none since October      Await bronchoscopy results  Continue voriconazole  No objection to discharging home with close follow-up of his creatinine if tomorrow's levels are good  The patient can follow up in the office in 2 weeks.    Lisa Villarreal MD  Date of Service: 12/01/2023  12:55 PM

## 2023-12-01 NOTE — SUBJECTIVE & OBJECTIVE
Interval History:  No acute overnight events.  Seen sitting in chair, does report intermittent left low anterior discomfort.  Intermittent coughing.  Does report shortness of breath worse with ambulation/activity.  States diarrhea is at his baseline.  Requesting to be changed to regular diet.  Telemetry last 24 hours with sinus rhythm.  On room air, afebrile with T-max 98.3°, labs with WBC 12, hemoglobin 11.9, BUN/creatinine 26/1.6, bronchoscopy cultures no growth to date.  Discussed with patient.  No visitors at bedside.    Review of Systems   Constitutional:  Negative for fever.   Respiratory:  Positive for cough and shortness of breath.    Cardiovascular:  Positive for chest pain.   Gastrointestinal:  Positive for diarrhea.   Psychiatric/Behavioral:  Negative for confusion.      Objective:     Vital Signs (Most Recent):  Temp: 97.4 °F (36.3 °C) (12/01/23 1120)  Pulse: 80 (12/01/23 1120)  Resp: 17 (12/01/23 1120)  BP: (!) 106/47 (12/01/23 1120)  SpO2: 95 % (12/01/23 1120) Vital Signs (24h Range):  Temp:  [97.4 °F (36.3 °C)-98.3 °F (36.8 °C)] 97.4 °F (36.3 °C)  Pulse:  [69-87] 80  Resp:  [16-18] 17  SpO2:  [94 %-98 %] 95 %  BP: ()/(47-60) 106/47     Weight: 129.3 kg (285 lb)  Body mass index is 47.43 kg/m².    Intake/Output Summary (Last 24 hours) at 12/1/2023 1436  Last data filed at 12/1/2023 0932  Gross per 24 hour   Intake 300 ml   Output --   Net 300 ml         Physical Exam  Vitals and nursing note reviewed.   Constitutional:       General: He is not in acute distress.     Appearance: He is obese. He is not ill-appearing.      Comments: Sitting in chair    HENT:      Head: Normocephalic and atraumatic.      Mouth/Throat:      Mouth: Mucous membranes are moist.   Cardiovascular:      Rate and Rhythm: Normal rate and regular rhythm.   Pulmonary:      Effort: No respiratory distress.      Breath sounds: No wheezing.      Comments: On RA  Abdominal:      Palpations: Abdomen is soft.      Tenderness: There  "is no abdominal tenderness.   Skin:     General: Skin is warm.   Neurological:      Mental Status: He is alert.             Significant Labs: BMP:   Recent Labs   Lab 12/01/23  0601   GLU 74      K 4.4      CO2 24   BUN 26*   CREATININE 1.6*   CALCIUM 9.3   MG 1.5*     CBC:   Recent Labs   Lab 11/30/23  0421 12/01/23  0601   WBC 15.20* 12.37   HGB 11.7* 11.9*   HCT 35.9* 38.0*    366     CMP:   Recent Labs   Lab 11/30/23  0421 12/01/23  0601    138   K 4.2 4.4    106   CO2 25 24   GLU 92 74   BUN 19 26*   CREATININE 1.4 1.6*   CALCIUM 9.1 9.3   PROT  --  7.6   ALBUMIN  --  3.3*   BILITOT  --  0.5   ALKPHOS  --  54*   AST  --  16   ALT  --  14   ANIONGAP 7* 8     Cardiac Markers: No results for input(s): "CKMB", "MYOGLOBIN", "BNP", "TROPISTAT" in the last 48 hours.  Lactic Acid: No results for input(s): "LACTATE" in the last 48 hours.  Magnesium:   Recent Labs   Lab 12/01/23  0601   MG 1.5*     Urine Culture: No results for input(s): "LABURIN" in the last 48 hours.  Urine Studies:   Recent Labs   Lab 11/29/23  1921   COLORU Yellow   APPEARANCEUA Hazy*   PHUR 6.0   SPECGRAV >1.030*   PROTEINUA 1+*   GLUCUA Negative   KETONESU Trace*   BILIRUBINUA Negative   OCCULTUA Negative   NITRITE Negative   UROBILINOGEN Negative   LEUKOCYTESUR Negative   RBCUA 2   WBCUA 5   BACTERIA Negative   SQUAMEPITHEL 7   HYALINECASTS 32*       Significant Imaging: I have reviewed all pertinent imaging results/findings within the past 24 hours.  "

## 2023-12-01 NOTE — PROGRESS NOTES
[facility] Medicine  Progress Note    Patient Name: Jacoby Jean-Baptiste  MRN: [id number]  Patient Class: IP- Inpatient   Admission Date: 11/28/2023  Length of Stay: 3 days  Attending Physician: Moon Armas MD  Primary Care Provider: Hunter Aguilar III, MD        Subjective:     Principal Problem:Cavitary pneumonia        HPI:  48 year old male with a PMH Cdiff, UC on Remicade, DM2, anemia, tobacco use, HLD, and anxiety.     Patient recently discharged after hospital admission for shortness breath, chest pain abdominal pain associated with CT chest that showed patchy nodular infiltrates, one of which is cavitary in the left lower lobe, suspicion for septic emboli and/or necrosis .  Patient treated with antibiotics, prophylactic p.o. vancomycin.  BAL cultures negative. KOH and AFB cultures negative, MONSERRAT negative for IE.  Anti GBM was negative.  Patient was treated with IV Levaquin and doxycycline. PO vancomycin given for C diff prophylaxis.     Patient states he has not really gotten any better since being treated for his condition.  Some tests were pending after discharge.  His aspergillus antigen is positive at 1.16    Overview/Hospital Course:  Assumed care of this patient on 11/30/23.  Patient with history of morbid obesity with BMI 46, ulcerative colitis, followed by Dr. Jean, previously on Remicade, diabetes mellitus, recent ex-smoker 3 weeks, anxiety, history of SVT status post prior ablation.  Recent [facility] admission 11/17 to 11/23 with concern for cavitating pneumonia versus septic emboli with necrosis, underwent bronchoscopy, monserrat, no vegetation or atrial thrombus, discharge to complete course of antibiotics.  Re-presented as no improvement, CT with progressive changes, multifocal nodular consolidation with central cavity possible due to septic emboli, lymphadenopathy.  Seen by Pulmonary and Infectious Disease.  Currently on piperacillin/tazobactam on voriconazole (aspergillus  antigen 1.16).  EKG on 11/28 with concern for atrial flutter with 4-1, did recently have outpatient monitor, predominant rhythm sinus with brief runs of SVT, followed by cardiologist Dr. Roman.  On 11/30 underwent bronchoscopy with transbronchial biopsy submitted, procedure complicated by small apical pneumothorax.    Interval History:  No acute overnight events.  Seen sitting in chair, does report intermittent left low anterior discomfort.  Intermittent coughing.  Does report shortness of breath worse with ambulation/activity.  States diarrhea is at his baseline.  Requesting to be changed to regular diet.  Telemetry last 24 hours with sinus rhythm.  On room air, afebrile with T-max 98.3°, labs with WBC 12, hemoglobin 11.9, BUN/creatinine 26/1.6, bronchoscopy cultures no growth to date.  Discussed with patient.  No visitors at bedside.    Review of Systems   Constitutional:  Negative for fever.   Respiratory:  Positive for cough and shortness of breath.    Cardiovascular:  Positive for chest pain.   Gastrointestinal:  Positive for diarrhea.   Psychiatric/Behavioral:  Negative for confusion.      Objective:     Vital Signs (Most Recent):  Temp: 97.4 °F (36.3 °C) (12/01/23 1120)  Pulse: 80 (12/01/23 1120)  Resp: 17 (12/01/23 1120)  BP: (!) 106/47 (12/01/23 1120)  SpO2: 95 % (12/01/23 1120) Vital Signs (24h Range):  Temp:  [97.4 °F (36.3 °C)-98.3 °F (36.8 °C)] 97.4 °F (36.3 °C)  Pulse:  [69-87] 80  Resp:  [16-18] 17  SpO2:  [94 %-98 %] 95 %  BP: ()/(47-60) 106/47     Weight: 129.3 kg (285 lb)  Body mass index is 47.43 kg/m².    Intake/Output Summary (Last 24 hours) at 12/1/2023 1436  Last data filed at 12/1/2023 0932  Gross per 24 hour   Intake 300 ml   Output --   Net 300 ml         Physical Exam  Vitals and nursing note reviewed.   Constitutional:       General: He is not in acute distress.     Appearance: He is obese. He is not ill-appearing.      Comments: Sitting in chair    HENT:      Head: Normocephalic  "and atraumatic.      Mouth/Throat:      Mouth: Mucous membranes are moist.   Cardiovascular:      Rate and Rhythm: Normal rate and regular rhythm.   Pulmonary:      Effort: No respiratory distress.      Breath sounds: No wheezing.      Comments: On RA  Abdominal:      Palpations: Abdomen is soft.      Tenderness: There is no abdominal tenderness.   Skin:     General: Skin is warm.   Neurological:      Mental Status: He is alert.             Significant Labs: BMP:   Recent Labs   Lab 12/01/23 0601   GLU 74      K 4.4      CO2 24   BUN 26*   CREATININE 1.6*   CALCIUM 9.3   MG 1.5*     CBC:   Recent Labs   Lab 11/30/23 0421 12/01/23 0601   WBC 15.20* 12.37   HGB 11.7* 11.9*   HCT 35.9* 38.0*    366     CMP:   Recent Labs   Lab 11/30/23 0421 12/01/23 0601    138   K 4.2 4.4    106   CO2 25 24   GLU 92 74   BUN 19 26*   CREATININE 1.4 1.6*   CALCIUM 9.1 9.3   PROT  --  7.6   ALBUMIN  --  3.3*   BILITOT  --  0.5   ALKPHOS  --  54*   AST  --  16   ALT  --  14   ANIONGAP 7* 8     Cardiac Markers: No results for input(s): "CKMB", "MYOGLOBIN", "BNP", "TROPISTAT" in the last 48 hours.  Lactic Acid: No results for input(s): "LACTATE" in the last 48 hours.  Magnesium:   Recent Labs   Lab 12/01/23 0601   MG 1.5*     Urine Culture: No results for input(s): "LABURIN" in the last 48 hours.  Urine Studies:   Recent Labs   Lab 11/29/23 1921   COLORU Yellow   APPEARANCEUA Hazy*   PHUR 6.0   SPECGRAV >1.030*   PROTEINUA 1+*   GLUCUA Negative   KETONESU Trace*   BILIRUBINUA Negative   OCCULTUA Negative   NITRITE Negative   UROBILINOGEN Negative   LEUKOCYTESUR Negative   RBCUA 2   WBCUA 5   BACTERIA Negative   SQUAMEPITHEL 7   HYALINECASTS 32*       Significant Imaging: I have reviewed all pertinent imaging results/findings within the past 24 hours.    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]  Yes    Ulcerative colitis [K51.90]  Yes     Chronic    Anemia [D64.9]  Yes     Chronic    Anxiety [F41.9]  Yes    BMI 45.0-49.9, adult [Z68.42]  Not Applicable    Type 2 diabetes mellitus without complication, without long-term current use of insulin [E11.9]  Yes    H/O cardiac radiofrequency ablation [Z98.890]  Not Applicable    History of supraventricular tachycardia [Z86.79]  Not Applicable      Resolved Hospital Problems    Diagnosis Date Resolved POA    Leucocytosis [D72.829] 12/01/2023 Yes     Priority: 3      Plan:  Continue care on medical floor with remote telemetry monitoring  On 11/30 underwent transbronchial biopsy, follow-up results, procedure complicated by small apical pneumothorax   Currently on piperacillin/tazobactam and voriconazole, adjust as per Infectious Disease recommendation   Continue prophylactic oral vancomycin with history of C diff, 125 mg b.i.d.  Recent KATJA with no atrial thrombus or vegetation  2 g IV magnesium supplementation  Ex-smoker 3 weeks, continue to reinforce smoking cessation counseling  Needs lifestyle modification for weight loss   Continue insulin with Accu-Cheks, as needed hypoglycemic measures   Remicade remains on hold, missed last dose due to ongoing pulmonary issues   EKG on 11/28 reporting atrial flutter with 4-1, appreciated in some leads but also ? artifact, history of SVT  Electrolytes sliding scale repletion  A.m. labs ordered  Appreciate all consultant's input   Further plan as per hospital course    VTE Risk Mitigation (From admission, onward)           Ordered     enoxaparin injection 40 mg  Every 12 hours         12/01/23 0954     IP VTE HIGH RISK  PATIENT  Once         11/28/23 2159     Place sequential compression device  Until discontinued         11/28/23 2159                    Discharge Planning   GERARDO: 12/2/2023     Code Status: Full Code   Is the patient medically ready for discharge?:     Reason for patient still in hospital (select all that apply): Consult recommendations  Discharge Plan A: Home   Discharge Delays: None known at this time              Moon Armas MD  Department of Hospital Medicine   [facility]

## 2023-12-01 NOTE — PLAN OF CARE
Pt AAOx 4. Rested well this shift. Currently up in chair. Pain managed with PRN medications. No acute events overnight. No signs, symptoms, or complaints of distress at this time. Care ongoing.     Problem: Adult Inpatient Plan of Care  Goal: Plan of Care Review  Outcome: Ongoing, Progressing  Goal: Patient-Specific Goal (Individualized)  Outcome: Ongoing, Progressing  Goal: Absence of Hospital-Acquired Illness or Injury  Outcome: Ongoing, Progressing  Goal: Optimal Comfort and Wellbeing  Outcome: Ongoing, Progressing  Goal: Readiness for Transition of Care  Outcome: Ongoing, Progressing     Problem: Bariatric Environmental Safety  Goal: Safety Maintained with Care  Outcome: Ongoing, Progressing     Problem: Diabetes Comorbidity  Goal: Blood Glucose Level Within Targeted Range  Outcome: Ongoing, Progressing     Problem: Infection  Goal: Absence of Infection Signs and Symptoms  Outcome: Ongoing, Progressing     Problem: Fluid Imbalance (Pneumonia)  Goal: Fluid Balance  Outcome: Ongoing, Progressing     Problem: Infection (Pneumonia)  Goal: Resolution of Infection Signs and Symptoms  Outcome: Ongoing, Progressing     Problem: Respiratory Compromise (Pneumonia)  Goal: Effective Oxygenation and Ventilation  Outcome: Ongoing, Progressing

## 2023-12-01 NOTE — CONSULTS
Consult for education. Diet now Regular. Provided patient with cardiac diabetic diet handout and contact information. Patient reports prediabetes on oral hyperglycemia meds at home.

## 2023-12-01 NOTE — CONSULTS
[facility]  Department of Cardiology  Consult Note      PATIENT NAME: Jacoby Jean-Baptiste    MRN: [id number]  TODAY'S DATE: 12/01/2023  ADMIT DATE: 11/28/2023                          CONSULT REQUESTED BY: Moon Armas MD    SUBJECTIVE     PRINCIPAL PROBLEM: Cavitary pneumonia      REASON FOR CONSULT:    Questionable aflutter on EKG 11/28/23      HPI:    Patient is a 48-year-old male known to our practice who was recently readmitted and is being treated for concern for cavitating pneumonia versus septic emboli with necrosis.  There was a question of atrial flutter on an EKG from 11/28 when he was not in the hospital.  However this has not been confirmed by a cardiologist.  Patient does have a history of SVT with ablation in the past.  Telemetry does not show any atrial fib or flutter however he does have sinus rhythm with lots artifact noted likely from his computer records which are sitting near his lab.        Review of patient's allergies indicates:  No Known Allergies    Past Medical History:   Diagnosis Date    Diabetes mellitus 01/2022    Hyperlipidemia 2015    Hypertension 2015    Insomnia 2015     Past Surgical History:   Procedure Laterality Date    BRONCHOSCOPY WITH FLUOROSCOPY Left 11/20/2023    Procedure: BRONCHOSCOPY, WITH FLUOROSCOPY;  Surgeon: Lisa Villarreal MD;  Location: [facility];  Service: Pulmonary;  Laterality: Left;    BRONCHOSCOPY WITH FLUOROSCOPY N/A 11/30/2023    Procedure: BRONCHOSCOPY, WITH FLUOROSCOPY;  Surgeon: Lisa Villarreal MD;  Location: [facility];  Service: Pulmonary;  Laterality: N/A;    CARDIAC ELECTROPHYSIOLOGY MAPPING AND ABLATION  2017    SVT    CHOLECYSTECTOMY  2011    COLONOSCOPY N/A 5/3/2022    Procedure: COLONOSCOPY;  Surgeon: Shan Jean III, MD;  Location: [facility];  Service: Endoscopy;  Laterality: N/A;    COLONOSCOPY WITH FECAL MICROBIOTA TRANSFER N/A 3/21/2023    Procedure: COLONOSCOPY, WITH FECAL MICROBIOTA TRANSFER;  Surgeon: David  MD Monty;  Location: [facility] ENDO;  Service: Endoscopy;  Laterality: N/A;    ESOPHAGOGASTRODUODENOSCOPY N/A 4/24/2023    Procedure: EGD (ESOPHAGOGASTRODUODENOSCOPY);  Surgeon: Shan Jean III, MD;  Location: [facility];  Service: Endoscopy;  Laterality: N/A;    GANGLION CYST EXCISION Left 1992    UMBILICAL HERNIA REPAIR  2011    with mesh     Social History     Tobacco Use    Smoking status: Former     Current packs/day: 1.00     Average packs/day: 1 pack/day for 30.9 years (30.9 ttl pk-yrs)     Types: Cigarettes     Start date: 1993    Smokeless tobacco: Never    Tobacco comments:     Pt states he has stopped smoking with Chantix three weeks ago. 12/1/23   Substance Use Topics    Alcohol use: Yes     Comment: ocass    Drug use: Not Currently        REVIEW OF SYSTEMS  CONSTITUTIONAL: Negative for chills, fatigue and fever.   EYES: No double vision, No blurred vision  NEURO: No headaches, No dizziness  RESPIRATORY: Negative for cough, shortness of breath and wheezing.    CARDIOVASCULAR: Negative for chest pain. Negative for palpitations and leg swelling.   GI: Negative for abdominal pain, No melena, diarrhea, nausea and vomiting.   : Negative for dysuria and frequency, Negative for hematuria  SKIN: Negative for bruising, Negative for edema or discoloration noted.   PSYCHIATRIC: Negative for depression, Negative for anxiety, Negative for memory loss  MUSCULOSKELETAL: Negative for neck pain, Negative for muscle weakness, Negative for back pain     OBJECTIVE     VITAL SIGNS (Most Recent)  Temp: 97.4 °F (36.3 °C) (12/01/23 1120)  Pulse: 80 (12/01/23 1120)  Resp: 17 (12/01/23 1120)  BP: (!) 106/47 (12/01/23 1120)  SpO2: 95 % (12/01/23 1120)    VENTILATION STATUS  Resp: 17 (12/01/23 1120)  SpO2: 95 % (12/01/23 1120)           I & O (Last 24H):  Intake/Output Summary (Last 24 hours) at 12/1/2023 143  Last data filed at 12/1/2023 0932  Gross per 24 hour   Intake 300 ml   Output --   Net 300 ml       WEIGHTS  Wt  Readings from Last 1 Encounters:   11/30/23 0858 129.3 kg (285 lb)   11/29/23 1616 130.6 kg (287 lb 14.7 oz)   11/28/23 1640 130.6 kg (288 lb)       PHYSICAL EXAM  GENERAL: well built, well nourished, well-developed in no apparent distress alert and oriented.   HEENT: Normocephalic. Pupils normal and conjunctivae normal.  Mucous membranes normal, no cyanosis or icterus, trachea central,no pallor or icterus is noted..   NECK: No JVD. No bruit..   THYROID: Thyroid not enlarged. No nodules present..   CARDIAC: Regular rate and rhythm. S1 is normal.S2 is normal.No gallops, clicks or murmurs noted at this time.  CHEST ANATOMY: normal.   LUNGS: Clear to auscultation. No wheezing or rhonchi..   ABDOMEN: Soft no masses or organomegaly.  No abdomen pulsations or bruits.  Normal bowel sounds. No pulsations and no masses felt, No guarding or rebound.   URINARY: No joiner catheter   EXTREMITIES: No cyanosis, clubbing or edema noted at this time., no calf tenderness bilaterally.   PERIPHERAL VASCULAR SYSTEM: Good palpable distal pulses.   CENTRAL NERVOUS SYSTEM: No focal motor or sensory deficits noted.   SKIN: Skin without lesions, moist, well perfused.   MUSCLE STRENGTH & TONE: No noteable weakness, atrophy or abnormal movement.     HOME MEDICATIONS:  Current Facility-Administered Medications on File Prior to Encounter   Medication Dose Route Frequency Provider Last Rate Last Admin    lactated ringers infusion   Intravenous Continuous David Millan MD 75 mL/hr at 03/21/23 1252 New Bag at 03/21/23 1252     Current Outpatient Medications on File Prior to Encounter   Medication Sig Dispense Refill    busPIRone (BUSPAR) 7.5 MG tablet Take 1 tablet (7.5 mg total) by mouth 3 (three) times daily. 90 tablet 1    dicyclomine (BENTYL) 10 MG capsule Take 10 mg by mouth 3 (three) times daily as needed.      diphenhydrAMINE (BENADRYL) 25 mg capsule Take 1 capsule (25 mg total) by mouth every 6 (six) hours as needed for Itching. 30  capsule 0    [] doxycycline (VIBRA-TABS) 100 MG tablet Take 1 tablet (100 mg total) by mouth 2 (two) times daily. for 7 days 14 tablet 0    [] levoFLOXacin (LEVAQUIN) 750 MG tablet Take 1 tablet (750 mg total) by mouth once daily. for 7 days 7 tablet 0    metFORMIN (GLUCOPHAGE-XR) 500 MG ER 24hr tablet Take 1 tablet (500 mg total) by mouth 2 (two) times daily with meals. 180 tablet 1    metoprolol succinate (TOPROL-XL) 50 MG 24 hr tablet Take 1 tablet (50 mg total) by mouth once daily. 90 tablet 1    pantoprazole (PROTONIX) 40 MG tablet Take 1 tablet (40 mg total) by mouth 2 (two) times daily. 180 tablet 3    promethazine (PHENERGAN) 12.5 MG Tab Take 12.5 mg by mouth 4 (four) times daily as needed.      semaglutide (OZEMPIC) 1 mg/dose (4 mg/3 mL) Inject 1 mg into the skin every 7 days. 3 each 1    sertraline (ZOLOFT) 100 MG tablet Take 1 tablet (100 mg total) by mouth once daily. 90 tablet 1    simvastatin (ZOCOR) 20 MG tablet Take 1 tablet (20 mg total) by mouth every evening. 90 tablet 1    [] vancomycin 125 mg/5 mL Soln Take 5 mLs (125 mg total) by mouth 2 (two) times a day. for 7 days 70 mL 0    varenicline (CHANTIX STARTING MONTH BOX) 0.5 mg (11)- 1 mg (42) tablet Take one 0.5mg tab by mouth once daily X3 days,then increase to one 0.5mg tab twice daily X4 days,then increase to one 1mg tab twice daily (Patient taking differently: Take 1 tablet by mouth 2 (two) times daily. Take one 0.5mg tab by mouth once daily X3 days,then increase to one 0.5mg tab twice daily X4 days,then increase to one 1mg tab twice daily) 1 each 0    zolpidem (AMBIEN) 10 mg Tab Take 1 tablet (10 mg total) by mouth nightly as needed (insomnia). 30 tablet 2    HYDROcodone-acetaminophen (NORCO) 5-325 mg per tablet Take 1 tablet by mouth every 6 (six) hours as needed for Pain. (Patient not taking: Reported on 2023) 10 tablet 0       SCHEDULED MEDS:   busPIRone  7.5 mg Oral TID    chlorhexidine  15 mL Mouth/Throat BID  "   colesevelam  1,875 mg Oral BID WM    dicyclomine  10 mg Oral TID    enoxparin  40 mg Subcutaneous Q12H (prophylaxis, 0900/2100)    metoprolol succinate  50 mg Oral Daily    naloxone  0.02 mg Intravenous Once    pantoprazole  40 mg Oral BID    piperacillin-tazobactam (Zosyn) IV (PEDS and ADULTS) (extended infusion is not appropriate)  4.5 g Intravenous Q8H    sertraline  100 mg Oral Daily    vancomycin  125 mg Oral BID    voriconazole  200 mg Oral BID       CONTINUOUS INFUSIONS:    PRN MEDS:acetaminophen, dextrose 50%, dextrose 50%, dextrose 50%, dextrose 50%, diphenhydrAMINE, diphenhydrAMINE, fentaNYL, glucagon (human recombinant), glucagon (human recombinant), glucose, glucose, glucose, glucose, hydrOXYzine HCL, insulin aspart U-100, naloxone, ondansetron, ondansetron, oxyCODONE, oxyCODONE-acetaminophen, sodium chloride 0.9%, zolpidem    LABS AND DIAGNOSTICS     CBC LAST 3 DAYS  Recent Labs   Lab 11/29/23  1301 11/30/23  0421 12/01/23  0601   WBC 19.48*  19.48* 15.20* 12.37   RBC 3.69*  3.69* 3.48* 3.57*   HGB 12.7*  12.7* 11.7* 11.9*   HCT 38.6*  38.6* 35.9* 38.0*   *  105* 103* 106*   MCH 34.4*  34.4* 33.6* 33.3*   MCHC 32.9  32.9 32.6 31.3*   RDW 13.7  13.7 13.8 13.8     361 353 366   MPV 9.8  9.8 9.9 10.2   GRAN 75.7*  75.7*  14.7*  14.7* 70.5  10.7* 66.8  8.3*   LYMPH 10.9*  10.9*  2.1  2.1 13.6*  2.1 18.5  2.3   MONO 9.2  9.2  1.8*  1.8* 10.1  1.5* 8.9  1.1*   BASO 0.08  0.08 0.10 0.10   NRBC 0  0 0 0       COAGULATION LAST 3 DAYS  No results for input(s): "LABPT", "INR", "APTT" in the last 168 hours.    CHEMISTRY LAST 3 DAYS  Recent Labs   Lab 11/28/23  1835 11/29/23  1301 11/30/23  0421 12/01/23  0601    136  136 136 138   K 4.3 3.9  3.9 4.2 4.4    104  104 104 106   CO2 24 26  26 25 24   ANIONGAP 7* 6*  6* 7* 8   BUN 16 13  13 19 26*   CREATININE 1.4 1.2  1.2 1.4 1.6*   GLU 91 92  92 92 74   CALCIUM 9.3 9.4  9.4 9.1 9.3   MG  --   --   --  " "1.5*   ALBUMIN 3.4* 3.6  --  3.3*   PROT 8.3 8.4  --  7.6   ALKPHOS 60 64  --  54*   ALT 20 18  --  14   AST 25 16  --  16   BILITOT 0.5 0.6  --  0.5       CARDIAC PROFILE LAST 3 DAYS  Recent Labs   Lab 11/28/23  1835   BNP 7   TROPONINIHS 3.5       ENDOCRINE LAST 3 DAYS  Recent Labs   Lab 11/28/23  1835 11/30/23  0835   PROCAL 5.349* 1.878*       LAST ARTERIAL BLOOD GAS  ABG  No results for input(s): "PH", "PO2", "PCO2", "HCO3", "BE" in the last 168 hours.    LAST 7 DAYS MICROBIOLOGY   Microbiology Results (last 7 days)       Procedure Component Value Units Date/Time    Culture, Respiratory with Gram Stain [[id number]] Collected: 11/30/23 1153    Order Status: Completed Specimen: Respiratory from Bronchial Wash Updated: 12/01/23 1219     Respiratory Culture No Growth     Gram Stain (Respiratory) <10 epithelial cells per low power field.     Gram Stain (Respiratory) Many WBC's     Gram Stain (Respiratory) Rare Gram positive cocci    Narrative:      Bronchial Wash    Culture, Respiratory with Gram Stain [[id number]] Collected: 11/29/23 1118    Order Status: Completed Specimen: Respiratory from Sputum Updated: 12/01/23 1205     Respiratory Culture Reduced normal respiratory marilu     Gram Stain (Respiratory) <10 epithelial cells per low power field.     Gram Stain (Respiratory) Many WBC's     Gram Stain (Respiratory) Rare Gram negative rods    Blood Culture #2 **CANNOT BE ORDERED STAT** [[id number]] Collected: 11/28/23 2021    Order Status: Completed Specimen: Blood Updated: 11/30/23 2232     Blood Culture, Routine No Growth to date      No Growth to date      No Growth to date    Blood Culture #1 **CANNOT BE ORDERED STAT** [[id number]] Collected: 11/28/23 2010    Order Status: Completed Specimen: Blood from Antecubital, Right Updated: 11/30/23 2232     Blood Culture, Routine No Growth to date      No Growth to date      No Growth to date    KOH prep [[id number]] Collected: 11/30/23 1153    Order Status: Completed " Specimen: Biopsy from Bronchial Wash Updated: 11/30/23 1448     KOH Prep No yeast or fungal elements seen    Narrative:      CONRADO & LLL Biopsy    FANNY prep [[id number]] Collected: 11/30/23 1153    Order Status: Completed Specimen: Respiratory from Bronchial Wash Updated: 11/30/23 1438     KOH Prep No yeast or fungal elements seen    Narrative:      Bronchial Wash    FANNY prep [[id number]] Collected: 11/30/23 1153    Order Status: Completed Specimen: Respiratory from Bronchial Wash Updated: 11/30/23 1434     KOH Prep No yeast or fungal elements seen    Narrative:      Bronchial Anatone     AFB Culture & Smear [[id number]] Collected: 11/30/23 1153    Order Status: Sent Specimen: Respiratory from Bronchial Wash Updated: 11/30/23 1204    Fungus culture [[id number]] Collected: 11/30/23 1153    Order Status: Sent Specimen: Respiratory from Bronchial Wash Updated: 11/30/23 1204    AFB Culture & Smear [[id number]] Collected: 11/30/23 1153    Order Status: Sent Specimen: Respiratory from Bronchial Wash Updated: 11/30/23 1204    Fungus culture [[id number]] Collected: 11/30/23 1153    Order Status: Sent Specimen: Respiratory from Bronchial Wash Updated: 11/30/23 1204    AFB Culture & Smear [[id number]] Collected: 11/30/23 1153    Order Status: Sent Specimen: Respiratory from Bronchial Wash Updated: 11/30/23 1203    Fungus culture [[id number]] Collected: 11/30/23 1153    Order Status: Sent Specimen: Respiratory from Bronchial Wash Updated: 11/30/23 1203    Clostridium difficile EIA [[id number]] Collected: 11/29/23 1118    Order Status: Completed Specimen: Stool Updated: 11/29/23 1453     C. diff Antigen Negative     C difficile Toxins A+B, EIA Negative     Comment: Testing not recommended for children <24 months old.       Respiratory Infection Panel (PCR), Nasopharyngeal [[id number]] Collected: 11/28/23 2258    Order Status: Completed Specimen: Nasopharyngeal Swab Updated: 11/29/23 0611     Respiratory Infection Panel Source NP  swab     Adenovirus Not Detected     Coronavirus 229E, Common Cold Virus Not Detected     Coronavirus HKU1, Common Cold Virus Not Detected     Coronavirus NL63, Common Cold Virus Not Detected     Coronavirus OC43, Common Cold Virus Not Detected     Comment: Coronavirus strains 229E, HKU1, NL63, and OC43 can cause the common   cold   and are not associated with the respiratory disease outbreak caused   by  the COVID-19 (SARS-CoV-2 novel Coronavirus) strain.           SARS-CoV2 (COVID-19) Qualitative PCR Not Detected     Human Metapneumovirus Not Detected     Human Rhinovirus/Enterovirus Not Detected     Influenza A (subtypes H1, H1-2009,H3) Not Detected     Influenza B Not Detected     Parainfluenza Virus 1 Not Detected     Parainfluenza Virus 2 Not Detected     Parainfluenza Virus 3 Not Detected     Parainfluenza Virus 4 Not Detected     Respiratory Syncytial Virus Not Detected     Bordetella Parapertussis (NX4139) Not Detected     Bordetella pertussis (ptxP) Not Detected     Chlamydia pneumoniae Not Detected     Mycoplasma pneumoniae Not Detected     Comment: Respiratory Infection Panel testing performed by Multiplex PCR.       Narrative:      Respiratory Infection Panel source->NP Swab    MRSA Screen by PCR [[id number]] Collected: 11/28/23 2258    Order Status: Completed Specimen: Nasopharyngeal Swab from Nasal Updated: 11/29/23 0210     MRSA SCREEN BY PCR Negative            MOST RECENT IMAGING  X-Ray Chest 1 View  Chest, single view.    HISTORY: Pneumothorax.    Comparison is made to an earlier examination.    The previously described small left apical pneumothorax has decreased slightly in size in the interval.    Patchy airspace opacities in the left lung remain unchanged. The right lung is appropriately expanded and clear. There is no significant effusion. The heart size and pulmonary vasculature are stable.    IMPRESSION:    Slight interval decrease in size of tiny left apical pneumothorax.    Unchanged  patchy left-sided airspace opacities.    Electronically signed by:  Kelton Wells MD  11/30/2023 03:56 PM CST Workstation: [id number]  X-Ray Chest 1 View  Reason: Post bronch    FINDINGS:    Portable chest with comparison chest x-ray November 28, 2023 show normal cardiomediastinal silhouette. There is a small apical left pneumothorax.  Interstitial opacities of the bilateral lungs noted with patchy airspace opacities of the left lung base. Pulmonary vasculature is normal. No acute osseous abnormality.    IMPRESSION:    1.  Small apical left pneumothorax. Findings reported to Dr. Lisa Villarreal at 11:19 AM.  2.  Bilateral interstitial lung opacities opacities of the left lung base.    Electronically signed by:  Charles Baker DO  11/30/2023 11:20 AM CST Workstation: [id number]  [place] Flouro Usage  See Notes    This procedure was auto-finalized.      ECHOCARDIOGRAM RESULTS (last 5)  Results for orders placed during the hospital encounter of 11/17/23    Transesophageal echo (KATJA)    Interpretation Summary    Left Ventricle: There is normal systolic function with a visually estimated ejection fraction of 55 - 60%.    Right Ventricle: Normal right ventricular cavity size. Systolic function is normal.    Left Atrium: Agitated saline study of the atrial septum is negative, suggesting absence of intracardiac shunt at the atrial level. There appears to be lipomatous hypertrophy of the interatrial septum. Appendage velocity is normal at greater than 40 cm/sec. There is no thrombus in the left atrial appendage.    Aortic Valve: The aortic valve is a trileaflet valve.    Mitral Valve: There is mild regurgitation.    Tricuspid Valve: There is mild regurgitation.    No definitive evidence of infective endocarditis on this study.  Consider repeating KATJA in a week if clinical suspicion still persists.      Echo    Interpretation Summary    Left Ventricle: The left ventricle is normal in size. Normal wall thickness. There is concentric  remodeling. Normal wall motion. There is normal systolic function with a visually estimated ejection fraction of 55 - 60%. There is normal diastolic function.    Right Ventricle: Normal right ventricular cavity size. Wall thickness is normal. Right ventricle wall motion  is normal. Systolic function is normal.    IVC/SVC: Normal venous pressure at 3 mmHg.      CURRENT/PREVIOUS VISIT EKG  Results for orders placed or performed during the hospital encounter of 11/28/23   EKG 12-lead    Collection Time: 11/28/23  5:18 PM    Narrative    Test Reason : R06.02,    Vent. Rate : 087 BPM     Atrial Rate : 340 BPM     P-R Int : 000 ms          QRS Dur : 078 ms      QT Int : 374 ms       P-R-T Axes : 046 021 036 degrees     QTc Int : 450 ms    Atrial flutter with 4:1 A-V conduction  Abnormal ECG  When compared with ECG of 19-NOV-2023 07:14,  Atrial flutter has replaced Sinus rhythm    Referred By: [email or web address]   SELF           Confirmed By:            ASSESSMENT/PLAN:     Active Hospital Problems    Diagnosis    *Cavitary pneumonia    Septic embolism    Ex-smoker 3 weeks    Hepatosplenomegaly    Pneumothorax after biopsy, apical    History of Clostridioides difficile colitis    Status post fecal microbiota transplant    Ulcerative colitis    Leucocytosis    Anemia    Anxiety    BMI 45.0-49.9, adult    Type 2 diabetes mellitus without complication, without long-term current use of insulin    H/O cardiac radiofrequency ablation    History of supraventricular tachycardia       ASSESSMENT & PLAN:     Questionable Aflutter versus artifact  Septic embolism  Cavitary pneumonia  Negative KATJA recently  Cdiff hx  requiring transplant of microbiota  Hx of SVT with ablation       RECOMMENDATIONS:    EKG evaluated and thought to be more artifact versus true aflutter. No arrhythmias noted on telemetry.   Recommend outpatient event monitor for 14-28 days. He would certainly need OAC due to high risk factors if true Afib or flutter was noted.    Thank you for the consultation.         Isabel Wang NP  Date of Service: 12/01/2023  2:34 PM    I have personally interviewed and examined the patient. I have reviewed the Nurse Practitioner's history and physical, assessment, and plan. I agree with the findings and made appropriate changes as necessary in recommendations.    1. EKG that was done on November 28, 2023 shows he is in normal sinus rhythm with baseline artifact.  Reviewed his telemetry and there has been no evidence of any arrhythmias.  2. Patient has history of septic embolism and cavitary pneumonia.  Currently on antibiotics  3. Patient has prior history of ablation of the supraventricular tachycardia.  And patient is currently on metoprolol succinate 50 mg p.o. daily continue the same.  4. Replace his magnesium his current magnesium is 1.5 would give him 2 g of Mag sulfate IV piggyback.  Aim to keep the magnesium at 2.1.  5. Will be available as needed thank you for the consultation.  Will sign off for now.      Surinder Roman MD  Department of Cardiology  [facility]  12/01/2023 2:38 PM

## 2023-12-01 NOTE — PLAN OF CARE
Met with pt to discuss discharge planning. Pt states that he continues to have no discharge needs and he is fine with CM making f/u appointment should he discharge over the weekend.  TCC scheduled with PCP 12/7/23 0900.  Will continue to follow.       12/01/23 1215   Post-Acute Status   Post-Acute Authorization   (Home independently)   Hospital Resources/Appts/Education Provided Appointments scheduled and added to AVS   Discharge Delays None known at this time   Discharge Plan   Discharge Plan A Home   Discharge Plan B Home

## 2023-12-01 NOTE — CARE UPDATE
12/01/23 0810   Patient Assessment/Suction   Respiratory Effort Unlabored   Expansion/Accessory Muscles/Retractions expansion symmetric;no retractions;no use of accessory muscles   All Lung Fields Breath Sounds Anterior:;Lateral:;diminished;clear   Rhythm/Pattern, Respiratory depth regular;pattern regular;unlabored   PRE-TX-O2   Device (Oxygen Therapy) room air   SpO2 (!) 94 %   Pulse Oximetry Type Intermittent   $ Pulse Oximetry - Multiple Charge Pulse Oximetry - Multiple   Pulse 70   Resp 16   Education   $ Education 15 min   Respiratory Evaluation   $ Care Plan Tech Time 15 min

## 2023-12-01 NOTE — PROGRESS NOTES
[facility]  Department of Infectious Disease  Progress Note        PATIENT NAME: Jacoby Jean-Baptiste  MRN: [id number]  TODAY'S DATE: 12/01/2023  ADMIT DATE: 11/28/2023    PRINCIPLE PROBLEM: Cavitary pneumonia    REASON FOR CONSULT: Atypical pneumonia     INTERVAL HISTORY      12/1@0917 (Denette):  Patient seen and examined alone in his room.  He was sitting up in bed awake and alert and does not appear in any distress.  He still complains of pain to his left upper chest area that is worse with breathing.  A chest x-ray from yesterday showed a small apical left pneumothorax and bilateral interstitial lung opacities of the left lung base and a repeat showed slight interval decrease in size of tiny left apical pneumothorax.  No chest x-ray this morning.  He denies chest pain palpitations, nausea or vomiting, abdominal pain, headaches or dizziness.  He states he has chronic diarrhea that is no worse than usual.  He said he is eating and drinking well.  T-max 98.3°, WBC 12.37 trending down, platelets 366, BUN/CR 26/1.6, ALT/AST 14/16.  Creatinine clearance 70.  He has a voriconazole level pending tomorrow.  KOH prep with no yeast or fungal elements.  Sputum culture from bronch from 11/30 with many WBCs and rare Gram-positive cocci.  MRSA screen was negative, respiratory virus panel was negative, sputum culture from admission with many WBCs, rare Gram-negative rods and reduced normal respiratory marilu.  CRP 61.6, procalcitonin decreased at 1.878.  Fungitell is pending.  He continues on Zosyn, voriconazole and prophylactic oral vancomycin.    11/30:  Interim reviewed, patient seen examined at bedside, he is sitting in the chair.  He looks rested today, still complaining of malaise.  He is also complaining of mild left chest pain after bronch. Chest x-ray postprocedure with small apical pneumothorax.  Status post bronchoscopy this morning, discussed with Dr. Villarreal and procedure note reviewed; no endobronchial of  mucosal abnormalities noted.  There was edema and thickening of the left upper lobe anterior bronchus.  2 transbronchial brushing and 3 transbronchial lung tissues biopsies taken.  Hemodynamically stable, afebrile.  Labs reviewed, white count leukocytosis down to 15.2, no left shift, H&H 11.7/35.9, .  CRP down to 61.6, procalcitonin down to 1.8, both trending down.  Fungitell in process.  Micro reviewed, so far no growth.  BAL wash KOH with no yeast or fungal elements seen, pending cultures.  Stool for C diff negative.     Antibiotics (From admission, onward)      Start     Stop Route Frequency Ordered    11/30/23 0900  vancomycin 125 mg/5 mL oral solution 125 mg         -- Oral 2 times daily 11/29/23 1914 11/29/23 2145  piperacillin-tazobactam 4.5 g in dextrose 5 % 100 mL IVPB (ready to mix)         -- IV Every 8 hours (non-standard times) 11/29/23 2039            Antifungals (From admission, onward)      Start     Stop Route Frequency Ordered    11/30/23 0900  voriconazole tablet 200 mg        See [facility] for full Linked Orders Report.    -- Oral 2 times daily 11/29/23 0814             ASSESSMENT and PLAN     Sepsis secondary to cavitary/atypical pneumonia with necrosis + Aspergillus Ag s/p levaquin and doxy, not improved s/p bronch and biopsies 11/30  Aspergillus Ag 1.16 positive  11/22 TB Gold negative  -->265.5-->187.6-->61.6, trending down   Procal 17-->12-->4.8-->5.3-->1.8  Blood cultures x2 sets no growth to date, pending final   MRSA nasal swab negative   RVP negative  Bronch cultures pending, fungitell pending  WBC improving, 17.31-->19.48-->15.20-->12.37   BUN/Creat 16/1.4-->13/1.2-->19/1.4-->26/1.6    11/22 Fungitell Positive 480.622    2.  Oral thrush, improved     3. H/o UC on Remicade, immunocompromised              TB gold negative              PPD negative     4. 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 PMHx:  Diabetes, HTN, HLD,  anemia, smoker, anxiety/insomnia, hep B positive serology, GERD, UC on Remicade, last dose Oct/2023         Recommendations:    Follow bronchial wash cultures including Aspergillus antigen from BAL  Continue Zosyn 4.5 g IV q8h over 4h infusion    Continue voriconazole 200mg twice a day  Check Vori level 12/2 ordered with AM labs  Oral vanco 125mg PO twice a day ppx for C diff while on antimicrobials   Follow Fungitell - previous Fungitell + from 11 days ago  Peridex twice a day   Monitor WBC - improving  Aspiration precautions   Monitor renal function     Discussed with Dr Patton and patient, reviewed diagnostics, reviewed notes from attending and consultants, drug monitoring    Thank you for this consult. Please send [employer] secure chat with any questions.      SUBJECTIVE        Review of Systems  Review of systems obtained and negative except as stated above in Interval History      OBJECTIVE     Temp:  [97.6 °F (36.4 °C)-98.3 °F (36.8 °C)] 97.9 °F (36.6 °C)  Pulse:  [69-97] 70  Resp:  [16-27] 16  SpO2:  [93 %-99 %] 94 %  BP: ()/(51-60) 92/57    Physical Exam  General:  Obese, middle-aged male, sitting up in bed awake and alert, chronically ill appearing.  Eyes: Eyes with no icterus or injection. Vision grossly normal  Ears: Hearing grossly normal.  Nose: Nares patent  Mouth: Moist mucous membranes, dentition is poor. No ulcerations, erythema or exudates.  Neck: Supple, no tenderness to palpation.  Cardiovascular: Regular rate and rhythm, no murmurs, no edema.    Respiratory:  Clear to auscultation bilaterally anterior and posterior, diminished bases, no tachypnea or increased work of breathing.  He is on room air. Tender at RU chest to aplpation  Gastrointestinal:  Soft and obese with active bowel sounds, no tenderness to palpation, no distention.  Genitourinary:  No suprapubic tenderness.  Musculoskeletal: Moves all extremities with equal, good strength.    Skin: Pale, warm and dry, no obvious rashes.   "  Neuro: Oriented, conversant, follows commands.  Psych: Good mood, flat affect.  VAD: PIV         Wounds: None  VAD: PIV  ISOLATION: None    CBC LAST 7 DAYS  Recent Labs   Lab 11/28/23  1835 11/29/23  1301 11/30/23  0421 12/01/23  0601   WBC 17.31* 19.48*  19.48* 15.20* 12.37   RBC 3.72* 3.69*  3.69* 3.48* 3.57*   HGB 12.6* 12.7*  12.7* 11.7* 11.9*   HCT 38.3* 38.6*  38.6* 35.9* 38.0*   * 105*  105* 103* 106*   MCH 33.9* 34.4*  34.4* 33.6* 33.3*   MCHC 32.9 32.9  32.9 32.6 31.3*   RDW 13.5 13.7  13.7 13.8 13.8    361  361 353 366   MPV 9.8 9.8  9.8 9.9 10.2   GRAN 73.1*  12.7* 75.7*  75.7*  14.7*  14.7* 70.5  10.7* 66.8  8.3*   LYMPH 11.7*  2.0 10.9*  10.9*  2.1  2.1 13.6*  2.1 18.5  2.3   MONO 9.0  1.6* 9.2  9.2  1.8*  1.8* 10.1  1.5* 8.9  1.1*   BASO 0.12 0.08  0.08 0.10 0.10   NRBC 0 0  0 0 0         CHEMISTRY LAST 7 DAYS  Recent Labs   Lab 11/28/23  1835 11/29/23  1301 11/30/23  0421 12/01/23  0601    136  136 136 138   K 4.3 3.9  3.9 4.2 4.4    104  104 104 106   CO2 24 26  26 25 24   ANIONGAP 7* 6*  6* 7* 8   BUN 16 13  13 19 26*   CREATININE 1.4 1.2  1.2 1.4 1.6*   GLU 91 92  92 92 74   CALCIUM 9.3 9.4  9.4 9.1 9.3   MG  --   --   --  1.5*   ALBUMIN 3.4* 3.6  --  3.3*   PROT 8.3 8.4  --  7.6   ALKPHOS 60 64  --  54*   ALT 20 18  --  14   AST 25 16  --  16   BILITOT 0.5 0.6  --  0.5         Estimated Creatinine Clearance: 70 mL/min (A) (based on SCr of 1.6 mg/dL (H)).    INFLAMMATORY/PROCAL  LAST 7 DAYS  Recent Labs   Lab 11/28/23  1835 11/30/23  0421 11/30/23  0835   PROCAL 5.349*  --  1.878*   CRP  --  61.6*  --        No results found for: "ESR"  CRP   Date Value Ref Range Status   11/30/2023 61.6 (H) 0.0 - 8.2 mg/L Final   11/22/2023 187.6 (H) 0.0 - 8.2 mg/L Final   11/20/2023 265.5 (H) 0.0 - 8.2 mg/L Final   11/17/2023 228.2 (H) 0.0 - 8.2 mg/L Final   07/25/2023 0.46 <0.76 mg/dL Final   04/06/2023 11.55 (H) <0.76 mg/dL Final   01/11/2023 " 20.88 (H) <0.76 mg/dL Final       PRIOR  MICROBIOLOGY:      LAST 7 DAYS MICROBIOLOGY   Microbiology Results (last 7 days)       Procedure Component Value Units Date/Time    Blood Culture #2 **CANNOT BE ORDERED STAT** [[id number]] Collected: 11/28/23 2021    Order Status: Completed Specimen: Blood Updated: 11/30/23 2232     Blood Culture, Routine No Growth to date      No Growth to date      No Growth to date    Blood Culture #1 **CANNOT BE ORDERED STAT** [[id number]] Collected: 11/28/23 2010    Order Status: Completed Specimen: Blood from Antecubital, Right Updated: 11/30/23 2232     Blood Culture, Routine No Growth to date      No Growth to date      No Growth to date    Culture, Respiratory with Gram Stain [[id number]] Collected: 11/30/23 1153    Order Status: Completed Specimen: Respiratory from Bronchial Wash Updated: 11/30/23 2042     Gram Stain (Respiratory) <10 epithelial cells per low power field.     Gram Stain (Respiratory) Many WBC's     Gram Stain (Respiratory) Rare Gram positive cocci    Narrative:      Bronchial Wash    Culture, Respiratory with Gram Stain [[id number]] Collected: 11/29/23 1118    Order Status: Completed Specimen: Respiratory from Sputum Updated: 11/30/23 1634     Respiratory Culture Reduced Normal Respiratory marilu     Gram Stain (Respiratory) <10 epithelial cells per low power field.     Gram Stain (Respiratory) Many WBC's     Gram Stain (Respiratory) Rare Gram negative rods    KOH prep [[id number]] Collected: 11/30/23 1153    Order Status: Completed Specimen: Biopsy from Bronchial Wash Updated: 11/30/23 1448     KOH Prep No yeast or fungal elements seen    Narrative:      CONRADO & LLL Biopsy    FANNY prep [[id number]] Collected: 11/30/23 1153    Order Status: Completed Specimen: Respiratory from Bronchial Wash Updated: 11/30/23 1438     KOH Prep No yeast or fungal elements seen    Narrative:      Bronchial Wash    FANNY prep [[id number]] Collected: 11/30/23 1153    Order Status: Completed  Specimen: Respiratory from Bronchial Wash Updated: 11/30/23 1434     KOH Prep No yeast or fungal elements seen    Narrative:      Bronchial [place]     AFB Culture & Smear [[id number]] Collected: 11/30/23 1153    Order Status: Sent Specimen: Respiratory from Bronchial Wash Updated: 11/30/23 1204    Fungus culture [[id number]] Collected: 11/30/23 1153    Order Status: Sent Specimen: Respiratory from Bronchial Wash Updated: 11/30/23 1204    AFB Culture & Smear [[id number]] Collected: 11/30/23 1153    Order Status: Sent Specimen: Respiratory from Bronchial Wash Updated: 11/30/23 1204    Fungus culture [[id number]] Collected: 11/30/23 1153    Order Status: Sent Specimen: Respiratory from Bronchial Wash Updated: 11/30/23 1204    AFB Culture & Smear [[id number]] Collected: 11/30/23 1153    Order Status: Sent Specimen: Respiratory from Bronchial Wash Updated: 11/30/23 1203    Fungus culture [[id number]] Collected: 11/30/23 1153    Order Status: Sent Specimen: Respiratory from Bronchial Wash Updated: 11/30/23 1203    Clostridium difficile EIA [[id number]] Collected: 11/29/23 1118    Order Status: Completed Specimen: Stool Updated: 11/29/23 1453     C. diff Antigen Negative     C difficile Toxins A+B, EIA Negative     Comment: Testing not recommended for children <24 months old.       Respiratory Infection Panel (PCR), Nasopharyngeal [[id number]] Collected: 11/28/23 2258    Order Status: Completed Specimen: Nasopharyngeal Swab Updated: 11/29/23 0611     Respiratory Infection Panel Source NP swab     Adenovirus Not Detected     Coronavirus 229E, Common Cold Virus Not Detected     Coronavirus HKU1, Common Cold Virus Not Detected     Coronavirus NL63, Common Cold Virus Not Detected     Coronavirus OC43, Common Cold Virus Not Detected     Comment: Coronavirus strains 229E, HKU1, NL63, and OC43 can cause the common   cold   and are not associated with the respiratory disease outbreak caused   by  the COVID-19 (SARS-CoV-2 novel  Coronavirus) strain.           SARS-CoV2 (COVID-19) Qualitative PCR Not Detected     Human Metapneumovirus Not Detected     Human Rhinovirus/Enterovirus Not Detected     Influenza A (subtypes H1, H1-2009,H3) Not Detected     Influenza B Not Detected     Parainfluenza Virus 1 Not Detected     Parainfluenza Virus 2 Not Detected     Parainfluenza Virus 3 Not Detected     Parainfluenza Virus 4 Not Detected     Respiratory Syncytial Virus Not Detected     Bordetella Parapertussis (BR4984) Not Detected     Bordetella pertussis (ptxP) Not Detected     Chlamydia pneumoniae Not Detected     Mycoplasma pneumoniae Not Detected     Comment: Respiratory Infection Panel testing performed by Multiplex PCR.       Narrative:      Respiratory Infection Panel source->NP Swab    MRSA Screen by PCR [[id number]] Collected: 11/28/23 2258    Order Status: Completed Specimen: Nasopharyngeal Swab from Nasal Updated: 11/29/23 0210     MRSA SCREEN BY PCR Negative            SUSCEPTIBILITY  Susceptibility data from last 90 days.  Collected Specimen Info Organism   11/19/23 Blood No growth after 5 days.   11/18/23 Blood No growth after 5 days.   11/17/23 Blood from Antecubital, Right Arm No growth after 5 days.         CURRENT/PREVIOUS VISIT EKG  Results for orders placed or performed during the hospital encounter of 11/28/23   EKG 12-lead    Collection Time: 11/28/23  5:18 PM    Narrative    Test Reason : R06.02,    Vent. Rate : 087 BPM     Atrial Rate : 340 BPM     P-R Int : 000 ms          QRS Dur : 078 ms      QT Int : 374 ms       P-R-T Axes : 046 021 036 degrees     QTc Int : 450 ms    Atrial flutter with 4:1 A-V conduction  Abnormal ECG  When compared with ECG of 19-NOV-2023 07:14,  Atrial flutter has replaced Sinus rhythm    Referred By: [email or web address]   SELF           Confirmed By:        Significant Labs: All pertinent labs within the past 24 hours have been reviewed.     Significant Imaging: I have reviewed all relevant and available  imaging results/findings within the past 24 hours.    CXR latest: stable small apical pneumothorax       CT chest:   1.  Interval development of multiple areas of nodular consolidation throughout the lung parenchyma, some of which have central cavitation. Findings are suggestive of septic emboli. Necrotizing pneumonia could also have this appearance   2.  Hepatosplenomegaly with fatty infiltration of the liver.   3.  Small nonobstructing kidney stones bilaterally.     X-Ray Chest 1 View 11/30/23 1103  1. Small apical left pneumothorax. Findings reported to Dr. Lisa Villarreal at 11:19 AM.   2.  Bilateral interstitial lung opacities opacities of the left lung base.    X-Ray Chest 1 View 11/30/23 1426  Slight interval decrease in size of tiny left apical pneumothorax.   Unchanged patchy left-sided airspace opacities.        Jessica Hammond NP  Date of Service: 12/01/2023  5:00 PM

## 2023-12-02 PROBLEM — Z79.60 LONG-TERM USE OF IMMUNOSUPPRESSANT MEDICATION: Status: ACTIVE | Noted: 2023-12-02

## 2023-12-02 PROBLEM — B44.9 ASPERGILLOSIS: Status: ACTIVE | Noted: 2023-12-02

## 2023-12-02 PROBLEM — E83.42 HYPOMAGNESEMIA: Status: RESOLVED | Noted: 2023-11-17 | Resolved: 2023-12-02

## 2023-12-02 LAB
ANION GAP SERPL CALC-SCNC: 7 MMOL/L (ref 8–16)
BACTERIA SPEC AEROBE CULT: NORMAL
BASOPHILS # BLD AUTO: 0.11 K/UL (ref 0–0.2)
BASOPHILS NFR BLD: 0.9 % (ref 0–1.9)
BUN SERPL-MCNC: 23 MG/DL (ref 6–20)
CALCIUM SERPL-MCNC: 8.9 MG/DL (ref 8.7–10.5)
CHLORIDE SERPL-SCNC: 107 MMOL/L (ref 95–110)
CO2 SERPL-SCNC: 23 MMOL/L (ref 23–29)
CREAT SERPL-MCNC: 1.3 MG/DL (ref 0.5–1.4)
DIFFERENTIAL METHOD: ABNORMAL
EOSINOPHIL # BLD AUTO: 0.6 K/UL (ref 0–0.5)
EOSINOPHIL NFR BLD: 4.7 % (ref 0–8)
ERYTHROCYTE [DISTWIDTH] IN BLOOD BY AUTOMATED COUNT: 13.5 % (ref 11.5–14.5)
EST. GFR  (NO RACE VARIABLE): >60 ML/MIN/1.73 M^2
GLUCOSE SERPL-MCNC: 82 MG/DL (ref 70–110)
GLUCOSE SERPL-MCNC: 82 MG/DL (ref 70–110)
GLUCOSE SERPL-MCNC: 98 MG/DL (ref 70–110)
GRAM STN SPEC: NORMAL
HCT VFR BLD AUTO: 34.9 % (ref 40–54)
HGB BLD-MCNC: 11.3 G/DL (ref 14–18)
IMM GRANULOCYTES # BLD AUTO: 0.09 K/UL (ref 0–0.04)
IMM GRANULOCYTES NFR BLD AUTO: 0.8 % (ref 0–0.5)
LYMPHOCYTES # BLD AUTO: 1.8 K/UL (ref 1–4.8)
LYMPHOCYTES NFR BLD: 15.3 % (ref 18–48)
MAGNESIUM SERPL-MCNC: 1.9 MG/DL (ref 1.6–2.6)
MCH RBC QN AUTO: 34.2 PG (ref 27–31)
MCHC RBC AUTO-ENTMCNC: 32.4 G/DL (ref 32–36)
MCV RBC AUTO: 106 FL (ref 82–98)
MONOCYTES # BLD AUTO: 1.2 K/UL (ref 0.3–1)
MONOCYTES NFR BLD: 10.5 % (ref 4–15)
NEUTROPHILS # BLD AUTO: 7.9 K/UL (ref 1.8–7.7)
NEUTROPHILS NFR BLD: 67.8 % (ref 38–73)
NRBC BLD-RTO: 0 /100 WBC
PLATELET # BLD AUTO: 333 K/UL (ref 150–450)
PMV BLD AUTO: 10.5 FL (ref 9.2–12.9)
POTASSIUM SERPL-SCNC: 4.2 MMOL/L (ref 3.5–5.1)
RBC # BLD AUTO: 3.3 M/UL (ref 4.6–6.2)
SODIUM SERPL-SCNC: 137 MMOL/L (ref 136–145)
WBC # BLD AUTO: 11.66 K/UL (ref 3.9–12.7)

## 2023-12-02 PROCEDURE — 85025 COMPLETE CBC W/AUTO DIFF WBC: CPT | Performed by: INTERNAL MEDICINE

## 2023-12-02 PROCEDURE — 94761 N-INVAS EAR/PLS OXIMETRY MLT: CPT

## 2023-12-02 PROCEDURE — 12000002 HC ACUTE/MED SURGE SEMI-PRIVATE ROOM

## 2023-12-02 PROCEDURE — 80285 DRUG ASSAY VORICONAZOLE: CPT | Performed by: INTERNAL MEDICINE

## 2023-12-02 PROCEDURE — 25000003 PHARM REV CODE 250: Performed by: INTERNAL MEDICINE

## 2023-12-02 PROCEDURE — 83735 ASSAY OF MAGNESIUM: CPT | Performed by: INTERNAL MEDICINE

## 2023-12-02 PROCEDURE — 99232 SBSQ HOSP IP/OBS MODERATE 35: CPT | Mod: ,,, | Performed by: INTERNAL MEDICINE

## 2023-12-02 PROCEDURE — 99900031 HC PATIENT EDUCATION (STAT)

## 2023-12-02 PROCEDURE — 25000003 PHARM REV CODE 250: Performed by: STUDENT IN AN ORGANIZED HEALTH CARE EDUCATION/TRAINING PROGRAM

## 2023-12-02 PROCEDURE — 63600175 PHARM REV CODE 636 W HCPCS: Performed by: INTERNAL MEDICINE

## 2023-12-02 PROCEDURE — 63600175 PHARM REV CODE 636 W HCPCS: Performed by: STUDENT IN AN ORGANIZED HEALTH CARE EDUCATION/TRAINING PROGRAM

## 2023-12-02 PROCEDURE — 94799 UNLISTED PULMONARY SVC/PX: CPT

## 2023-12-02 PROCEDURE — 94760 N-INVAS EAR/PLS OXIMETRY 1: CPT

## 2023-12-02 PROCEDURE — 99900035 HC TECH TIME PER 15 MIN (STAT)

## 2023-12-02 PROCEDURE — 30000890 LABCORP MISCELLANEOUS TEST: Performed by: INTERNAL MEDICINE

## 2023-12-02 PROCEDURE — 99232 PR SUBSEQUENT HOSPITAL CARE,LEVL II: ICD-10-PCS | Mod: ,,, | Performed by: INTERNAL MEDICINE

## 2023-12-02 PROCEDURE — 80048 BASIC METABOLIC PNL TOTAL CA: CPT | Performed by: INTERNAL MEDICINE

## 2023-12-02 PROCEDURE — 25000003 PHARM REV CODE 250: Performed by: ANESTHESIOLOGY

## 2023-12-02 PROCEDURE — 36415 COLL VENOUS BLD VENIPUNCTURE: CPT | Performed by: INTERNAL MEDICINE

## 2023-12-02 RX ORDER — VORICONAZOLE 200 MG/1
200 TABLET, FILM COATED ORAL 3 TIMES DAILY
Status: DISCONTINUED | OUTPATIENT
Start: 2023-12-02 | End: 2023-12-04

## 2023-12-02 RX ORDER — SERTRALINE HYDROCHLORIDE 50 MG/1
50 TABLET, FILM COATED ORAL DAILY
Status: DISCONTINUED | OUTPATIENT
Start: 2023-12-03 | End: 2023-12-02

## 2023-12-02 RX ADMIN — DICYCLOMINE HYDROCHLORIDE 10 MG: 10 CAPSULE ORAL at 09:12

## 2023-12-02 RX ADMIN — VANCOMYCIN HYDROCHLORIDE 125 MG: KIT at 08:12

## 2023-12-02 RX ADMIN — ENOXAPARIN SODIUM 40 MG: 40 INJECTION SUBCUTANEOUS at 08:12

## 2023-12-02 RX ADMIN — OXYCODONE AND ACETAMINOPHEN 1 TABLET: 5; 325 TABLET ORAL at 12:12

## 2023-12-02 RX ADMIN — DICYCLOMINE HYDROCHLORIDE 10 MG: 10 CAPSULE ORAL at 08:12

## 2023-12-02 RX ADMIN — PIPERACILLIN AND TAZOBACTAM 4.5 G: 4; .5 INJECTION, POWDER, LYOPHILIZED, FOR SOLUTION INTRAVENOUS at 05:12

## 2023-12-02 RX ADMIN — PANTOPRAZOLE SODIUM 40 MG: 40 TABLET, DELAYED RELEASE ORAL at 09:12

## 2023-12-02 RX ADMIN — DIPHENHYDRAMINE HYDROCHLORIDE 25 MG: 25 CAPSULE ORAL at 04:12

## 2023-12-02 RX ADMIN — BUSPIRONE HYDROCHLORIDE 7.5 MG: 5 TABLET ORAL at 09:12

## 2023-12-02 RX ADMIN — COLESEVELAM HYDROCHLORIDE 1875 MG: 625 TABLET, COATED ORAL at 08:12

## 2023-12-02 RX ADMIN — DICYCLOMINE HYDROCHLORIDE 10 MG: 10 CAPSULE ORAL at 04:12

## 2023-12-02 RX ADMIN — PANTOPRAZOLE SODIUM 40 MG: 40 TABLET, DELAYED RELEASE ORAL at 08:12

## 2023-12-02 RX ADMIN — VORICONAZOLE 200 MG: 200 TABLET, FILM COATED ORAL at 04:12

## 2023-12-02 RX ADMIN — ENOXAPARIN SODIUM 40 MG: 40 INJECTION SUBCUTANEOUS at 09:12

## 2023-12-02 RX ADMIN — OXYCODONE AND ACETAMINOPHEN 1 TABLET: 5; 325 TABLET ORAL at 05:12

## 2023-12-02 RX ADMIN — METOPROLOL SUCCINATE 50 MG: 50 TABLET, EXTENDED RELEASE ORAL at 09:12

## 2023-12-02 RX ADMIN — CHLORHEXIDINE GLUCONATE 15 ML: 1.2 RINSE ORAL at 09:12

## 2023-12-02 RX ADMIN — VANCOMYCIN HYDROCHLORIDE 125 MG: KIT at 12:12

## 2023-12-02 RX ADMIN — CHLORHEXIDINE GLUCONATE 15 ML: 1.2 RINSE ORAL at 08:12

## 2023-12-02 RX ADMIN — ZOLPIDEM TARTRATE 10 MG: 5 TABLET, COATED ORAL at 08:12

## 2023-12-02 RX ADMIN — DIPHENHYDRAMINE HYDROCHLORIDE 25 MG: 25 CAPSULE ORAL at 10:12

## 2023-12-02 RX ADMIN — VORICONAZOLE 200 MG: 200 TABLET, FILM COATED ORAL at 09:12

## 2023-12-02 RX ADMIN — DIPHENHYDRAMINE HYDROCHLORIDE 25 MG: 25 CAPSULE ORAL at 09:12

## 2023-12-02 RX ADMIN — OXYCODONE HYDROCHLORIDE 5 MG: 5 TABLET ORAL at 09:12

## 2023-12-02 RX ADMIN — VORICONAZOLE 200 MG: 200 TABLET, FILM COATED ORAL at 08:12

## 2023-12-02 RX ADMIN — SERTRALINE HYDROCHLORIDE 100 MG: 50 TABLET ORAL at 09:12

## 2023-12-02 RX ADMIN — COLESEVELAM HYDROCHLORIDE 1875 MG: 625 TABLET, COATED ORAL at 04:12

## 2023-12-02 RX ADMIN — OXYCODONE AND ACETAMINOPHEN 1 TABLET: 5; 325 TABLET ORAL at 08:12

## 2023-12-02 NOTE — PROGRESS NOTES
"[facility]  Department of Infectious Disease  Progress Note        PATIENT NAME: Jacoby Jean-Baptiste  MRN: [id number]  TODAY'S DATE: 12/02/2023  ADMIT DATE: 11/28/2023    PRINCIPLE PROBLEM: Cavitary pneumonia    REASON FOR CONSULT: Atypical pneumonia     INTERVAL HISTORY    12/2(Pooja)   : seen yesterday: "Reviewed lung biopsy results which show necrotizing granulomatous inflammation, special stains pending, no vasculitis.  Fungitell 137. Smears from bronch are negative on KOH x3   He is to have a voriconazole trough tomorrow, but I would increase dose to 400 mg po bid while we wait as his 4 mg/kg dose  would be 500 mg bid.   Sending rx to pharmacy downstairs to prevent authorization hold up."  Today, voriconazole level not drawn as a trough but was added to am labs. Increased the dose to 200 mg tid as I believe his weight and volume of distribution will demonstrate to be a factor in achieving high enough level. The voriconazole level will take several days to come back. Reviewed drug interactions and will need to decrease the buspar, drop the zoloft, follow the QTc(470 today). He coughed up a little blood and would like to stay another day. D/w Dr. Armas. Special stains on lung biopsy pending  .  Has an itchy rash the origin of which is unknown.      12/1@0949 (Manish):  Patient seen and examined alone in his room.  He was sitting up in bed awake and alert and does not appear in any distress.  He still complains of pain to his left upper chest area that is worse with breathing.  A chest x-ray from yesterday showed a small apical left pneumothorax and bilateral interstitial lung opacities of the left lung base and a repeat showed slight interval decrease in size of tiny left apical pneumothorax.  No chest x-ray this morning.  He denies chest pain palpitations, nausea or vomiting, abdominal pain, headaches or dizziness.  He states he has chronic diarrhea that is no worse than usual.  He said he is " eating and drinking well.  T-max 98.3°, WBC 12.37 trending down, platelets 366, BUN/CR 26/1.6, ALT/AST 14/16.  Creatinine clearance 70.  He has a voriconazole level pending tomorrow.  KOH prep with no yeast or fungal elements.  Sputum culture from bronch from 11/30 with many WBCs and rare Gram-positive cocci.  MRSA screen was negative, respiratory virus panel was negative, sputum culture from admission with many WBCs, rare Gram-negative rods and reduced normal respiratory marilu.  CRP 61.6, procalcitonin decreased at 1.878.  Fungitell is pending.  He continues on Zosyn, voriconazole and prophylactic oral vancomycin.    11/30:  Interim reviewed, patient seen examined at bedside, he is sitting in the chair.  He looks rested today, still complaining of malaise.  He is also complaining of mild left chest pain after bronch. Chest x-ray postprocedure with small apical pneumothorax.  Status post bronchoscopy this morning, discussed with Dr. Villarreal and procedure note reviewed; no endobronchial of mucosal abnormalities noted.  There was edema and thickening of the left upper lobe anterior bronchus.  2 transbronchial brushing and 3 transbronchial lung tissues biopsies taken.  Hemodynamically stable, afebrile.  Labs reviewed, white count leukocytosis down to 15.2, no left shift, H&H 11.7/35.9, .  CRP down to 61.6, procalcitonin down to 1.8, both trending down.  Fungitell in process.  Micro reviewed, so far no growth.  BAL wash KOH with no yeast or fungal elements seen, pending cultures.  Stool for C diff negative.     Antibiotics (From admission, onward)      Start     Stop Route Frequency Ordered    11/30/23 0900  vancomycin 125 mg/5 mL oral solution 125 mg         -- Oral 2 times daily 11/29/23 1914            Antifungals (From admission, onward)      Start     Stop Route Frequency Ordered    12/02/23 1500  voriconazole tablet 200 mg        See Hyperspace for full Linked Orders Report.    -- Oral 3 times daily 12/02/23  1320             ASSESSMENT and PLAN     Sepsis secondary to cavitary/atypical pneumonia with necrosis + Aspergillus Ag s/p levaquin and doxy, not improved s/p bronch and biopsies 11/30 necrotizing granulomas  Aspergillus Ag 1.16 positive  11/22 TB Gold negative  -->265.5-->187.6-->61.6, trending down   Procal 17-->12-->4.8-->5.3-->1.8  Blood cultures x2 sets no growth to date, pending final   MRSA nasal swab negative   RVP negative  Bronch cultures pending, fungitell pending  WBC improving, 17.31-->19.48-->15.20-->12.37   BUN/Creat 16/1.4-->13/1.2-->19/1.4-->26/1.6    11/22 Fungitell Positive 480.622    2.  Oral thrush, improved     3. H/o UC on Remicade, immunocompromised              TB gold negative              PPD negative     4. 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  Follicular rash  6. PMHx:  Diabetes, HTN, HLD, anemia, smoker, anxiety/insomnia, hep B positive serology, GERD, UC on Remicade, last dose Oct/2023         Recommendations:    Follow bronchial wash cultures including Aspergillus antigen from BAL  Stop zosyn  Continue voriconazole 200mg 3 times a day  Check Vori level 12/2 ordered with AM labs  Oral vanco 125mg PO twice a day ppx for C diff while on antimicrobials   Follow Fungitell - previous Fungitell + from 11 days ago  Peridex twice a day   Monitor WBC - improving  Aspiration precautions   Monitor renal function  Hopefull home tomorrow          SUBJECTIVE        Review of Systems  Review of systems obtained and negative except as stated above in Interval History      OBJECTIVE     Temp:  [97.4 °F (36.3 °C)-98.3 °F (36.8 °C)] 98.3 °F (36.8 °C)  Pulse:  [] 78  Resp:  [16-18] 18  SpO2:  [94 %-98 %] 96 %  BP: (101-161)/(48-81) 130/74    Physical Exam  General:  Obese, middle-aged male, sitting up in bed awake and alert, chronically ill appearing.  Eyes: Eyes with no icterus or injection. Vision grossly normal  Ears: Hearing grossly  normal.  Nose: Nares patent  Mouth: Moist mucous membranes, dentition is poor. No ulcerations, erythema or exudates.  Neck: Supple, no tenderness to palpation.  Cardiovascular: Regular rate and rhythm, no murmurs, no edema.    Respiratory:  Clear to auscultation bilaterally anterior and posterior, diminished bases, no tachypnea or increased work of breathing.  He is on room air. Tender at RU chest to aplpation  Gastrointestinal:  Soft and obese with active bowel sounds, no tenderness to palpation, no distention.  Genitourinary:  No suprapubic tenderness.  Musculoskeletal: Moves all extremities with equal, good strength.    Skin: Pale, warm and dry, follicular rash, excoriated so initial path unclear  Neuro: Oriented, conversant, follows commands.  Psych: Good mood, flat affect.  VAD: PIV         Wounds: None  VAD: PIV  ISOLATION: None    CBC LAST 7 DAYS  Recent Labs   Lab 11/28/23  1835 11/29/23  1301 11/30/23  0421 12/01/23  0601 12/02/23  0455   WBC 17.31* 19.48*  19.48* 15.20* 12.37 11.66   RBC 3.72* 3.69*  3.69* 3.48* 3.57* 3.30*   HGB 12.6* 12.7*  12.7* 11.7* 11.9* 11.3*   HCT 38.3* 38.6*  38.6* 35.9* 38.0* 34.9*   * 105*  105* 103* 106* 106*   MCH 33.9* 34.4*  34.4* 33.6* 33.3* 34.2*   MCHC 32.9 32.9  32.9 32.6 31.3* 32.4   RDW 13.5 13.7  13.7 13.8 13.8 13.5    361  361 353 366 333   MPV 9.8 9.8  9.8 9.9 10.2 10.5   GRAN 73.1*  12.7* 75.7*  75.7*  14.7*  14.7* 70.5  10.7* 66.8  8.3* 67.8  7.9*   LYMPH 11.7*  2.0 10.9*  10.9*  2.1  2.1 13.6*  2.1 18.5  2.3 15.3*  1.8   MONO 9.0  1.6* 9.2  9.2  1.8*  1.8* 10.1  1.5* 8.9  1.1* 10.5  1.2*   BASO 0.12 0.08  0.08 0.10 0.10 0.11   NRBC 0 0  0 0 0 0         CHEMISTRY LAST 7 DAYS  Recent Labs   Lab 11/28/23  1835 11/29/23  1301 11/30/23  0421 12/01/23  0601 12/02/23  0455    136  136 136 138 137   K 4.3 3.9  3.9 4.2 4.4 4.2    104  104 104 106 107   CO2 24 26  26 25 24 23   ANIONGAP 7* 6*  6* 7* 8 7*   BUN  "16 13  13 19 26* 23*   CREATININE 1.4 1.2  1.2 1.4 1.6* 1.3   GLU 91 92  92 92 74 82   CALCIUM 9.3 9.4  9.4 9.1 9.3 8.9   MG  --   --   --  1.5* 1.9   ALBUMIN 3.4* 3.6  --  3.3*  --    PROT 8.3 8.4  --  7.6  --    ALKPHOS 60 64  --  54*  --    ALT 20 18  --  14  --    AST 25 16  --  16  --    BILITOT 0.5 0.6  --  0.5  --          Estimated Creatinine Clearance: 86.1 mL/min (based on SCr of 1.3 mg/dL).    INFLAMMATORY/PROCAL  LAST 7 DAYS  Recent Labs   Lab 11/28/23  1835 11/30/23  0421 11/30/23  0835   PROCAL 5.349*  --  1.878*   CRP  --  61.6*  --        No results found for: "ESR"  CRP   Date Value Ref Range Status   11/30/2023 61.6 (H) 0.0 - 8.2 mg/L Final   11/22/2023 187.6 (H) 0.0 - 8.2 mg/L Final   11/20/2023 265.5 (H) 0.0 - 8.2 mg/L Final   11/17/2023 228.2 (H) 0.0 - 8.2 mg/L Final   07/25/2023 0.46 <0.76 mg/dL Final   04/06/2023 11.55 (H) <0.76 mg/dL Final   01/11/2023 20.88 (H) <0.76 mg/dL Final       PRIOR  MICROBIOLOGY:      LAST 7 DAYS MICROBIOLOGY   Microbiology Results (last 7 days)       Procedure Component Value Units Date/Time    Culture, Respiratory with Gram Stain [[id number]] Collected: 11/30/23 1153    Order Status: Completed Specimen: Respiratory from Bronchial Wash Updated: 12/02/23 1144     Respiratory Culture No normal respiratory marilu     Gram Stain (Respiratory) <10 epithelial cells per low power field.     Gram Stain (Respiratory) Many WBC's     Gram Stain (Respiratory) Rare Gram positive cocci    Narrative:      Bronchial Wash    Blood Culture #2 **CANNOT BE ORDERED STAT** [[id number]] Collected: 11/28/23 2021    Order Status: Completed Specimen: Blood Updated: 12/01/23 2232     Blood Culture, Routine No Growth to date      No Growth to date      No Growth to date      No Growth to date    Blood Culture #1 **CANNOT BE ORDERED STAT** [[id number]] Collected: 11/28/23 2010    Order Status: Completed Specimen: Blood from Antecubital, Right Updated: 12/01/23 2232     Blood Culture, " Routine No Growth to date      No Growth to date      No Growth to date      No Growth to date    Culture, Respiratory with Gram Stain [[id number]] Collected: 11/29/23 1118    Order Status: Completed Specimen: Respiratory from Sputum Updated: 12/01/23 1205     Respiratory Culture Reduced normal respiratory marilu     Gram Stain (Respiratory) <10 epithelial cells per low power field.     Gram Stain (Respiratory) Many WBC's     Gram Stain (Respiratory) Rare Gram negative rods    KOH prep [[id number]] Collected: 11/30/23 1153    Order Status: Completed Specimen: Biopsy from Bronchial Wash Updated: 11/30/23 1448     KOH Prep No yeast or fungal elements seen    Narrative:      CONRADO & LLL Biopsy    FANNY prep [[id number]] Collected: 11/30/23 1153    Order Status: Completed Specimen: Respiratory from Bronchial Wash Updated: 11/30/23 1438     KOH Prep No yeast or fungal elements seen    Narrative:      Bronchial Wash    FANNY prep [[id number]] Collected: 11/30/23 1153    Order Status: Completed Specimen: Respiratory from Bronchial Wash Updated: 11/30/23 1434     KOH Prep No yeast or fungal elements seen    Narrative:      Bronchial Mayo     AFB Culture & Smear [[id number]] Collected: 11/30/23 1153    Order Status: Sent Specimen: Respiratory from Bronchial Wash Updated: 11/30/23 1204    Fungus culture [[id number]] Collected: 11/30/23 1153    Order Status: Sent Specimen: Respiratory from Bronchial Wash Updated: 11/30/23 1204    AFB Culture & Smear [[id number]] Collected: 11/30/23 1153    Order Status: Sent Specimen: Respiratory from Bronchial Wash Updated: 11/30/23 1204    Fungus culture [[id number]] Collected: 11/30/23 1153    Order Status: Sent Specimen: Respiratory from Bronchial Wash Updated: 11/30/23 1204    AFB Culture & Smear [[id number]] Collected: 11/30/23 1153    Order Status: Sent Specimen: Respiratory from Bronchial Wash Updated: 11/30/23 1203    Fungus culture [[id number]] Collected: 11/30/23 1153    Order Status:  Sent Specimen: Respiratory from Bronchial Wash Updated: 11/30/23 1203    Clostridium difficile EIA [[id number]] Collected: 11/29/23 1118    Order Status: Completed Specimen: Stool Updated: 11/29/23 1453     C. diff Antigen Negative     C difficile Toxins A+B, EIA Negative     Comment: Testing not recommended for children <24 months old.       Respiratory Infection Panel (PCR), Nasopharyngeal [[id number]] Collected: 11/28/23 2258    Order Status: Completed Specimen: Nasopharyngeal Swab Updated: 11/29/23 0611     Respiratory Infection Panel Source NP swab     Adenovirus Not Detected     Coronavirus 229E, Common Cold Virus Not Detected     Coronavirus HKU1, Common Cold Virus Not Detected     Coronavirus NL63, Common Cold Virus Not Detected     Coronavirus OC43, Common Cold Virus Not Detected     Comment: Coronavirus strains 229E, HKU1, NL63, and OC43 can cause the common   cold   and are not associated with the respiratory disease outbreak caused   by  the COVID-19 (SARS-CoV-2 novel Coronavirus) strain.           SARS-CoV2 (COVID-19) Qualitative PCR Not Detected     Human Metapneumovirus Not Detected     Human Rhinovirus/Enterovirus Not Detected     Influenza A (subtypes H1, H1-2009,H3) Not Detected     Influenza B Not Detected     Parainfluenza Virus 1 Not Detected     Parainfluenza Virus 2 Not Detected     Parainfluenza Virus 3 Not Detected     Parainfluenza Virus 4 Not Detected     Respiratory Syncytial Virus Not Detected     Bordetella Parapertussis (KM5240) Not Detected     Bordetella pertussis (ptxP) Not Detected     Chlamydia pneumoniae Not Detected     Mycoplasma pneumoniae Not Detected     Comment: Respiratory Infection Panel testing performed by Multiplex PCR.       Narrative:      Respiratory Infection Panel source->NP Swab    MRSA Screen by PCR [[id number]] Collected: 11/28/23 2258    Order Status: Completed Specimen: Nasopharyngeal Swab from Nasal Updated: 11/29/23 0210     MRSA SCREEN BY PCR Negative             SUSCEPTIBILITY  Susceptibility data from last 90 days.  Collected Specimen Info Organism   11/19/23 Blood No growth after 5 days.   11/18/23 Blood No growth after 5 days.   11/17/23 Blood from Antecubital, Right Arm No growth after 5 days.         CURRENT/PREVIOUS VISIT EKG  Results for orders placed or performed during the hospital encounter of 11/28/23   EKG 12-lead    Collection Time: 11/28/23  5:18 PM    Narrative    Test Reason : R06.02,    Vent. Rate : 087 BPM     Atrial Rate : 340 BPM     P-R Int : 000 ms          QRS Dur : 078 ms      QT Int : 374 ms       P-R-T Axes : 046 021 036 degrees     QTc Int : 450 ms    Atrial flutter with 4:1 A-V conduction  Abnormal ECG  When compared with ECG of 19-NOV-2023 07:14,  Atrial flutter has replaced Sinus rhythm    Referred By: SELVIN   SELF           Confirmed By:        Significant Labs: All pertinent labs within the past 24 hours have been reviewed.     Significant Imaging: I have reviewed all relevant and available imaging results/findings within the past 24 hours.    CXR latest: stable small apical pneumothorax       CT chest:   1.  Interval development of multiple areas of nodular consolidation throughout the lung parenchyma, some of which have central cavitation. Findings are suggestive of septic emboli. Necrotizing pneumonia could also have this appearance   2.  Hepatosplenomegaly with fatty infiltration of the liver.   3.  Small nonobstructing kidney stones bilaterally.     X-Ray Chest 1 View 11/30/23 1103  1. Small apical left pneumothorax. Findings reported to Dr. Lisa Villarreal at 11:19 AM.   2.  Bilateral interstitial lung opacities opacities of the left lung base.    X-Ray Chest 1 View 11/30/23 1426  Slight interval decrease in size of tiny left apical pneumothorax.   Unchanged patchy left-sided airspace opacities.        Paola Patton MD  Date of Service: 12/02/2023  5:00 PM

## 2023-12-02 NOTE — PROGRESS NOTES
[facility]  Progress Note    Patient Name: Jacoby Jean-Baptiste  MRN: [id number]  Patient Class: IP- Inpatient   Admission Date: 11/28/2023  Length of Stay: 4 days  Attending Physician: Moon Armas MD  Primary Care Provider: Hunter Aguilar III, MD        Subjective:     Principal Problem:Cavitary pneumonia        HPI:  48 year old male with a PMH Cdiff, UC on Remicade, DM2, anemia, tobacco use, HLD, and anxiety.     Patient recently discharged after hospital admission for shortness breath, chest pain abdominal pain associated with CT chest that showed patchy nodular infiltrates, one of which is cavitary in the left lower lobe, suspicion for septic emboli and/or necrosis .  Patient treated with antibiotics, prophylactic p.o. vancomycin.  BAL cultures negative. KOH and AFB cultures negative, MONSERRAT negative for IE.  Anti GBM was negative.  Patient was treated with IV Levaquin and doxycycline. PO vancomycin given for C diff prophylaxis.     Patient states he has not really gotten any better since being treated for his condition.  Some tests were pending after discharge.  His aspergillus antigen is positive at 1.16    Overview/Hospital Course:  Assumed care of this patient on 11/30/23.  Patient with history of morbid obesity with BMI 46, ulcerative colitis, followed by Dr. Jean, previously on Remicade, diabetes mellitus, recent ex-smoker 3 weeks, anxiety, history of SVT status post prior ablation.  Recent [facility] admission 11/17 to 11/23 with concern for cavitating pneumonia versus septic emboli with necrosis, underwent bronchoscopy, monserrat, no vegetation or atrial thrombus, discharge to complete course of antibiotics.  Re-presented as no improvement, CT with progressive changes, multifocal nodular consolidation with central cavity possible due to septic emboli, lymphadenopathy.  Seen by Pulmonary and Infectious Disease.  Currently on piperacillin/tazobactam on voriconazole (aspergillus  antigen 1.16).  EKG on 11/28 with concern for atrial flutter with 4-1, did recently have outpatient monitor, predominant rhythm sinus with brief runs of SVT, followed by cardiologist Dr. Roman.  On 11/30 underwent bronchoscopy with transbronchial biopsy submitted, procedure complicated by small apical pneumothorax.    Washings negative for malignancy but + inflammation.  Biopsy + necrotizing granulomatous inflammation, focal organizing chronic interstitial pneumonitis, no vasculitis, no organisms.  Infectious Disease recommends discharge on voriconazole 400 mg b.i.d., BuSpar to decreased to 2.5 mg daily, Zoloft 50 mg daily while on same, EKG in 3-4 days to monitor QTC, separate from colesevelam.  He has developed small volume bloody sputum today, is concerned about same, does not feel ready for discharge due to same.    Interval History:  Patient producing small amount of phlegm, bloody today, he is concerned about same, does not feel ready for discharge.  Still experiencing left anterior lower chest pain.  Tolerating oral diet.  Does report pruritus.  On room air.  Afebrile.  Labs with BUN/creatinine 23/1.3.  Communicated with consultants.  Discussed with patient.  No visitors at bedside.    Review of Systems   Constitutional:  Negative for fever.   Respiratory:  Positive for shortness of breath.    Cardiovascular:  Positive for chest pain.   Psychiatric/Behavioral:  Negative for confusion.      Objective:     Vital Signs (Most Recent):  Temp: 98.3 °F (36.8 °C) (12/02/23 1152)  Pulse: 78 (12/02/23 1152)  Resp: 18 (12/02/23 1236)  BP: 130/74 (12/02/23 1152)  SpO2: 96 % (12/02/23 1152) Vital Signs (24h Range):  Temp:  [97.4 °F (36.3 °C)-98.3 °F (36.8 °C)] 98.3 °F (36.8 °C)  Pulse:  [] 78  Resp:  [16-18] 18  SpO2:  [94 %-98 %] 96 %  BP: (101-161)/(48-81) 130/74     Weight: 129.3 kg (285 lb)  Body mass index is 47.43 kg/m².    Intake/Output Summary (Last 24 hours) at 12/2/2023 1303  Last data filed at 12/2/2023  "0617  Gross per 24 hour   Intake 850 ml   Output --   Net 850 ml         Physical Exam  Vitals and nursing note reviewed.   Constitutional:       General: He is not in acute distress.     Appearance: He is obese. He is not ill-appearing.      Comments: Sitting in chair    HENT:      Head: Normocephalic and atraumatic.      Mouth/Throat:      Mouth: Mucous membranes are moist.   Cardiovascular:      Rate and Rhythm: Normal rate and regular rhythm.   Pulmonary:      Effort: No respiratory distress.      Breath sounds: No wheezing.      Comments: On RA  Abdominal:      Palpations: Abdomen is soft.      Tenderness: There is no abdominal tenderness.   Skin:     General: Skin is warm.   Neurological:      Mental Status: He is alert.             Significant Labs: BMP:   Recent Labs   Lab 12/02/23  0455   GLU 82      K 4.2      CO2 23   BUN 23*   CREATININE 1.3   CALCIUM 8.9   MG 1.9     CBC:   Recent Labs   Lab 12/01/23  0601 12/02/23  0455   WBC 12.37 11.66   HGB 11.9* 11.3*   HCT 38.0* 34.9*    333     CMP:   Recent Labs   Lab 12/01/23  0601 12/02/23  0455    137   K 4.4 4.2    107   CO2 24 23   GLU 74 82   BUN 26* 23*   CREATININE 1.6* 1.3   CALCIUM 9.3 8.9   PROT 7.6  --    ALBUMIN 3.3*  --    BILITOT 0.5  --    ALKPHOS 54*  --    AST 16  --    ALT 14  --    ANIONGAP 8 7*     Magnesium:   Recent Labs   Lab 12/01/23  0601 12/02/23  0455   MG 1.5* 1.9     POCT Glucose: No results for input(s): "POCTGLUCOSE" in the last 48 hours.    Significant Imaging: I have reviewed all pertinent imaging results/findings within the past 24 hours.    Assessment/Plan:      Active Hospital Problems    Diagnosis  POA    *Cavitary pneumonia [J18.9, J98.4]  Yes    Pneumothorax after biopsy, apical [J95.811]  No     Priority: 2     Septic embolism [I76]  Yes    Ex-smoker 3 weeks [Z87.891]  Not Applicable    Hepatosplenomegaly [R16.2]  Yes    Chest pain [R07.9]  Yes    History of Clostridioides difficile colitis " [Z86.19]  Not Applicable     Chronic    Status post fecal microbiota transplant [Z92.89]  Yes    Ulcerative colitis [K51.90]  Yes     Chronic    Anemia [D64.9]  Yes     Chronic    Anxiety [F41.9]  Yes    BMI 45.0-49.9, adult [Z68.42]  Not Applicable    Type 2 diabetes mellitus without complication, without long-term current use of insulin [E11.9]  Yes    H/O cardiac radiofrequency ablation [Z98.890]  Not Applicable    History of supraventricular tachycardia [Z86.79]  Not Applicable      Resolved Hospital Problems    Diagnosis Date Resolved POA    Leucocytosis [D72.829] 12/01/2023 Yes     Priority: 3     Hypomagnesemia [E83.42] 12/02/2023 No     Plan:  Continue care on medical floor with remote telemetry monitoring  On 11/30 underwent transbronchial biopsy, washings negative for malignancy but + inflammation.  Biopsy + necrotizing granulomatous inflammation, focal organizing chronic interstitial pneumonitis, no vasculitis, no organisms  procedure complicated by small apical pneumothorax   Continue voriconazole 200 mg t.i.d., prescription already delivered to bedside   While on voriconazole, BuSpar decreased to 2.5 daily, Zoloft 50 mg daily, discussed with patient and agreeable, monitoring QTC  Recent KATJA with no atrial thrombus or vegetation  Ex-smoker 3 weeks, continue to reinforce smoking cessation counseling  Needs lifestyle modification for weight loss   Continue insulin with Accu-Cheks, as needed hypoglycemic measures   Remicade remains on hold, missed last dose due to ongoing pulmonary issues   Electrolytes sliding scale repletion  A.m. labs ordered  Appreciate all consultant's input   Discussed possible discharge, he wishes for continued hospitalization given mild hemoptysis today  Further plan as per hospital course    VTE Risk Mitigation (From admission, onward)           Ordered     enoxaparin injection 40 mg  Every 12 hours         12/01/23 0988     IP VTE HIGH RISK PATIENT  Once         11/28/23 2686      Place sequential compression device  Until discontinued         11/28/23 2159                    Discharge Planning   GERARDO: 12/2/2023     Code Status: Full Code   Is the patient medically ready for discharge?:     Reason for patient still in hospital (select all that apply): Patient trending condition  Discharge Plan A: Home   Discharge Delays: None known at this time              Moon Armas MD  Department of Hospital Medicine   [facility]

## 2023-12-02 NOTE — SUBJECTIVE & OBJECTIVE
Interval History:  Patient producing small amount of phlegm, bloody today, he is concerned about same, does not feel ready for discharge.  Still experiencing left anterior lower chest pain.  Tolerating oral diet.  Does report pruritus.  On room air.  Afebrile.  Labs with BUN/creatinine 23/1.3.  Communicated with consultants.  Discussed with patient.  No visitors at bedside.    Review of Systems   Constitutional:  Negative for fever.   Respiratory:  Positive for shortness of breath.    Cardiovascular:  Positive for chest pain.   Psychiatric/Behavioral:  Negative for confusion.      Objective:     Vital Signs (Most Recent):  Temp: 98.3 °F (36.8 °C) (12/02/23 1152)  Pulse: 78 (12/02/23 1152)  Resp: 18 (12/02/23 1236)  BP: 130/74 (12/02/23 1152)  SpO2: 96 % (12/02/23 1152) Vital Signs (24h Range):  Temp:  [97.4 °F (36.3 °C)-98.3 °F (36.8 °C)] 98.3 °F (36.8 °C)  Pulse:  [] 78  Resp:  [16-18] 18  SpO2:  [94 %-98 %] 96 %  BP: (101-161)/(48-81) 130/74     Weight: 129.3 kg (285 lb)  Body mass index is 47.43 kg/m².    Intake/Output Summary (Last 24 hours) at 12/2/2023 1303  Last data filed at 12/2/2023 0617  Gross per 24 hour   Intake 850 ml   Output --   Net 850 ml         Physical Exam  Vitals and nursing note reviewed.   Constitutional:       General: He is not in acute distress.     Appearance: He is obese. He is not ill-appearing.      Comments: Sitting in chair    HENT:      Head: Normocephalic and atraumatic.      Mouth/Throat:      Mouth: Mucous membranes are moist.   Cardiovascular:      Rate and Rhythm: Normal rate and regular rhythm.   Pulmonary:      Effort: No respiratory distress.      Breath sounds: No wheezing.      Comments: On RA  Abdominal:      Palpations: Abdomen is soft.      Tenderness: There is no abdominal tenderness.   Skin:     General: Skin is warm.   Neurological:      Mental Status: He is alert.             Significant Labs: BMP:   Recent Labs   Lab 12/02/23  0455   GLU 82      K 4.2  "     CO2 23   BUN 23*   CREATININE 1.3   CALCIUM 8.9   MG 1.9     CBC:   Recent Labs   Lab 12/01/23  0601 12/02/23  0455   WBC 12.37 11.66   HGB 11.9* 11.3*   HCT 38.0* 34.9*    333     CMP:   Recent Labs   Lab 12/01/23  0601 12/02/23  0455    137   K 4.4 4.2    107   CO2 24 23   GLU 74 82   BUN 26* 23*   CREATININE 1.6* 1.3   CALCIUM 9.3 8.9   PROT 7.6  --    ALBUMIN 3.3*  --    BILITOT 0.5  --    ALKPHOS 54*  --    AST 16  --    ALT 14  --    ANIONGAP 8 7*     Magnesium:   Recent Labs   Lab 12/01/23  0601 12/02/23  0455   MG 1.5* 1.9     POCT Glucose: No results for input(s): "POCTGLUCOSE" in the last 48 hours.    Significant Imaging: I have reviewed all pertinent imaging results/findings within the past 24 hours.  "

## 2023-12-02 NOTE — PROGRESS NOTES
Pulmonary/Critical Care Consult      PATIENT NAME: Jacoby Jean-Baptiste  MRN: [id number]  TODAY'S DATE: 2023  12:55 PM  ADMIT DATE: 2023  AGE: 48 y.o. : 1975    CONSULT REQUESTED BY: Moon Armas MD    REASON FOR CONSULT:   Cavitary pneumonia, progressive    HPI:  The patient is a 48-year-old male who returned to the emergency room because he was not feeling any better.  He had been hospitalized earlier in the month with a multilobar nodular infiltrate with the left lower lobe nodule cavitating.  He underwent bronchoscopy which was unrevealing.  His blood cultures were negative.  In the interim since he was discharged, he has not felt any better despite completing his antibiotics.  Not clearing any mucus  He is not running fever.  He has an Aspergillus antigen that is positive at 1.16.  The patient has been immunosuppressed with Remicade for his ulcerative colitis.  He has not had that in weeks now.  His CT on presentation is significantly worse this time than last time with a left-sided predominance.      the patient underwent bronchoscopy this morning.  Unfortunately, he has a small apical pneumothorax.  He is currently on 2 L of oxygen.  Good specimens were obtained.  The patient remains quite stable.     the patient is still in bed this morning.  His chest x-ray shows that his pneumothorax is very very tiny and apical.  His microbiology is negative from his bronch yesterday to date.  His cytology and histopathology results have not been released.  He is saturating 95% on room air.  His creatinine is a little higher today.    2023 - Stable overnight, still with some left sided CP and reports that he did have some blood in his sputum.  No new + cultures.  Washings negative for malignancy but + inflammation.  Biopsy + necrotizing granulomatous inflammation, focal organizing chronic interstitial pneumonitis, no vasculitis, no organisms    REVIEW OF SYSTEMS  GENERAL: Feeling  poorly  EYES: Vision is good.  ENT: No sinusitis or pharyngitis.   HEART: No chest pain or palpitations.  LUNGS:  He coughs but does not clear any mucus  GI: No Nausea, vomiting, constipation, diarrhea, or reflux.  : No dysuria, hesitancy, or nocturia.  SKIN: No lesions or rashes.  MUSCULOSKELETAL: No joint pain or myalgias.  NEURO: No headaches or neuropathy.  LYMPH: No edema or adenopathy.  PSYCH: No anxiety or depression.  ENDO: No weight change.    No change in the patient's Past Medical History, Past Surgical History, Social History or Family History since admission.      VITAL SIGNS (MOST RECENT)  Temp: 97.7 °F (36.5 °C) (12/02/23 0807)  Pulse: 100 (12/02/23 0833)  Resp: 18 (12/02/23 0923)  BP: (!) 161/69 (12/02/23 0807)  SpO2: 95 % (12/02/23 0833)    INTAKE AND OUTPUT (LAST 24 HOURS):  Intake/Output Summary (Last 24 hours) at 12/2/2023 1035  Last data filed at 12/2/2023 0617  Gross per 24 hour   Intake 850 ml   Output --   Net 850 ml       WEIGHT  Wt Readings from Last 1 Encounters:   11/30/23 129.3 kg (285 lb)       PHYSICAL EXAM  GENERAL:  Mid aged patient in no distress.    HEENT: Pupils equal and reactive. Extraocular movements intact. Nose intact. Pharynx moist.  NECK: Supple.   HEART: Regular rate and rhythm. No murmur or gallop auscultated.  LUNGS:  The lungs are clear to auscultation and percussion bilaterally Lung excursion symmetrical. No change in fremitus. No adventitial noises.  ABDOMEN: Bowel sounds present. Non-tender, no masses palpated.  : Normal anatomy.  EXTREMITIES: Normal muscle tone and joint movement, no cyanosis or clubbing.   LYMPHATICS: No adenopathy palpated, no edema.  SKIN: Dry, intact, no lesions.   NEURO: Cranial nerves II-XII intact. Motor strength 5/5 bilaterally, upper and lower extremities.  PSYCH: Appropriate affect      CBC LAST (LAST 24 HOURS)  Recent Labs   Lab 12/02/23  0455   WBC 11.66   RBC 3.30*   HGB 11.3*   HCT 34.9*   *   MCH 34.2*   MCHC 32.4   RDW  13.5      MPV 10.5   GRAN 67.8  7.9*   LYMPH 15.3*  1.8   MONO 10.5  1.2*   BASO 0.11   NRBC 0       CHEMISTRY LAST (LAST 24 HOURS)  Recent Labs   Lab 12/02/23  0455      K 4.2      CO2 23   ANIONGAP 7*   BUN 23*   CREATININE 1.3   GLU 82   CALCIUM 8.9   MG 1.9           CARDIAC PROFILE (LAST 24 HOURS)  Recent Labs   Lab 11/28/23  1835   BNP 7   TROPONINIHS 3.5       LAST 7 DAYS MICROBIOLOGY   Microbiology Results (last 7 days)       Procedure Component Value Units Date/Time    Blood Culture #2 **CANNOT BE ORDERED STAT** [[id number]] Collected: 11/28/23 2021    Order Status: Completed Specimen: Blood Updated: 12/01/23 2232     Blood Culture, Routine No Growth to date      No Growth to date      No Growth to date      No Growth to date    Blood Culture #1 **CANNOT BE ORDERED STAT** [[id number]] Collected: 11/28/23 2010    Order Status: Completed Specimen: Blood from Antecubital, Right Updated: 12/01/23 2232     Blood Culture, Routine No Growth to date      No Growth to date      No Growth to date      No Growth to date    Culture, Respiratory with Gram Stain [[id number]] Collected: 11/30/23 1153    Order Status: Completed Specimen: Respiratory from Bronchial Wash Updated: 12/01/23 1617     Respiratory Culture No Growth     Gram Stain (Respiratory) <10 epithelial cells per low power field.     Gram Stain (Respiratory) Many WBC's     Gram Stain (Respiratory) Rare Gram positive cocci    Narrative:      Bronchial Wash    Culture, Respiratory with Gram Stain [[id number]] Collected: 11/29/23 1118    Order Status: Completed Specimen: Respiratory from Sputum Updated: 12/01/23 1205     Respiratory Culture Reduced normal respiratory marilu     Gram Stain (Respiratory) <10 epithelial cells per low power field.     Gram Stain (Respiratory) Many WBC's     Gram Stain (Respiratory) Rare Gram negative rods    KOH prep [[id number]] Collected: 11/30/23 1153    Order Status: Completed Specimen: Biopsy from  Bronchial Wash Updated: 11/30/23 1448     KOH Prep No yeast or fungal elements seen    Narrative:      CONRADO & LLL Biopsy    FANNY prep [[id number]] Collected: 11/30/23 1153    Order Status: Completed Specimen: Respiratory from Bronchial Wash Updated: 11/30/23 1438     KOH Prep No yeast or fungal elements seen    Narrative:      Bronchial Wash    FANNY prep [[id number]] Collected: 11/30/23 1153    Order Status: Completed Specimen: Respiratory from Bronchial Wash Updated: 11/30/23 1434     KOH Prep No yeast or fungal elements seen    Narrative:      Bronchial [place]     AFB Culture & Smear [[id number]] Collected: 11/30/23 1153    Order Status: Sent Specimen: Respiratory from Bronchial Wash Updated: 11/30/23 1204    Fungus culture [[id number]] Collected: 11/30/23 1153    Order Status: Sent Specimen: Respiratory from Bronchial Wash Updated: 11/30/23 1204    AFB Culture & Smear [[id number]] Collected: 11/30/23 1153    Order Status: Sent Specimen: Respiratory from Bronchial Wash Updated: 11/30/23 1204    Fungus culture [[id number]] Collected: 11/30/23 1153    Order Status: Sent Specimen: Respiratory from Bronchial Wash Updated: 11/30/23 1204    AFB Culture & Smear [[id number]] Collected: 11/30/23 1153    Order Status: Sent Specimen: Respiratory from Bronchial Wash Updated: 11/30/23 1203    Fungus culture [[id number]] Collected: 11/30/23 1153    Order Status: Sent Specimen: Respiratory from Bronchial Wash Updated: 11/30/23 1203    Clostridium difficile EIA [[id number]] Collected: 11/29/23 1118    Order Status: Completed Specimen: Stool Updated: 11/29/23 1453     C. diff Antigen Negative     C difficile Toxins A+B, EIA Negative     Comment: Testing not recommended for children <24 months old.       Respiratory Infection Panel (PCR), Nasopharyngeal [[id number]] Collected: 11/28/23 2258    Order Status: Completed Specimen: Nasopharyngeal Swab Updated: 11/29/23 0611     Respiratory Infection Panel Source NP swab     Adenovirus Not  Detected     Coronavirus 229E, Common Cold Virus Not Detected     Coronavirus HKU1, Common Cold Virus Not Detected     Coronavirus NL63, Common Cold Virus Not Detected     Coronavirus OC43, Common Cold Virus Not Detected     Comment: Coronavirus strains 229E, HKU1, NL63, and OC43 can cause the common   cold   and are not associated with the respiratory disease outbreak caused   by  the COVID-19 (SARS-CoV-2 novel Coronavirus) strain.           SARS-CoV2 (COVID-19) Qualitative PCR Not Detected     Human Metapneumovirus Not Detected     Human Rhinovirus/Enterovirus Not Detected     Influenza A (subtypes H1, H1-2009,H3) Not Detected     Influenza B Not Detected     Parainfluenza Virus 1 Not Detected     Parainfluenza Virus 2 Not Detected     Parainfluenza Virus 3 Not Detected     Parainfluenza Virus 4 Not Detected     Respiratory Syncytial Virus Not Detected     Bordetella Parapertussis (PZ3740) Not Detected     Bordetella pertussis (ptxP) Not Detected     Chlamydia pneumoniae Not Detected     Mycoplasma pneumoniae Not Detected     Comment: Respiratory Infection Panel testing performed by Multiplex PCR.       Narrative:      Respiratory Infection Panel source->NP Swab    MRSA Screen by PCR [[id number]] Collected: 11/28/23 2258    Order Status: Completed Specimen: Nasopharyngeal Swab from Nasal Updated: 11/29/23 0210     MRSA SCREEN BY PCR Negative            MOST RECENT IMAGING  X-Ray Chest 1 View  Chest, single view.    HISTORY: Pneumothorax.    Comparison is made to an earlier examination.    The previously described small left apical pneumothorax has decreased slightly in size in the interval.    Patchy airspace opacities in the left lung remain unchanged. The right lung is appropriately expanded and clear. There is no significant effusion. The heart size and pulmonary vasculature are stable.    IMPRESSION:    Slight interval decrease in size of tiny left apical pneumothorax.    Unchanged patchy left-sided airspace  opacities.    Electronically signed by:  Kelton Wells MD  11/30/2023 03:56 PM CST Workstation: [id number]  X-Ray Chest 1 View  Reason: Post bronch    FINDINGS:    Portable chest with comparison chest x-ray November 28, 2023 show normal cardiomediastinal silhouette. There is a small apical left pneumothorax.  Interstitial opacities of the bilateral lungs noted with patchy airspace opacities of the left lung base. Pulmonary vasculature is normal. No acute osseous abnormality.    IMPRESSION:    1.  Small apical left pneumothorax. Findings reported to Dr. Lisa Villarreal at 11:19 AM.  2.  Bilateral interstitial lung opacities opacities of the left lung base.    Electronically signed by:  Charles Baker DO  11/30/2023 11:20 AM CST Workstation: [id number]  [place] Flouro Usage  See Notes    This procedure was auto-finalized.      CURRENT VISIT EKG  No results found for this visit on 11/28/23.    ECHOCARDIOGRAM RESULTS  Results for orders placed during the hospital encounter of 11/17/23    Echo    Interpretation Summary    Left Ventricle: The left ventricle is normal in size. Normal wall thickness. There is concentric remodeling. Normal wall motion. There is normal systolic function with a visually estimated ejection fraction of 55 - 60%. There is normal diastolic function.    Right Ventricle: Normal right ventricular cavity size. Wall thickness is normal. Right ventricle wall motion  is normal. Systolic function is normal.    IVC/SVC: Normal venous pressure at 3 mmHg.      The patient is on room air         IMPRESSION AND PLAN  Progressive pneumonic findings, patchy and in some places cavitating.  Mostly on the left  Positive aspergillus antigen, negative fungal smear and cultures to date  Procalcitonin positive and Aspergillus is not known to do this.  Leukocytosis better  Prior workup with ANCAs and anti-glomerular membrane antibodies were negative  Minimally positive   Prior KATJA negative  Ulcerative colitis on Remicade  but none since October      Bronchoscopy results as above, wait on cultures - ? If this is just pulmonary manifestation of hi UC  Continue voriconazole  No objection to discharging home   The patient can follow up with Dr Villarreal in the office in 2 weeks.      Kirk Petersen MD  [facility] Pulmonary/Critical Care  12/02/2023

## 2023-12-02 NOTE — RESPIRATORY THERAPY
12/02/23 0100   Patient Assessment/Suction   Level of Consciousness (AVPU) alert   Respiratory Effort Normal;Unlabored   Expansion/Accessory Muscles/Retractions no use of accessory muscles;no retractions;expansion symmetric   All Lung Fields Breath Sounds Anterior:;clear   Rhythm/Pattern, Respiratory unlabored;pattern regular;depth regular   PRE-TX-O2   Device (Oxygen Therapy) room air   SpO2 96 %   Pulse Oximetry Type Intermittent   $ Pulse Oximetry - Multiple Charge Pulse Oximetry - Multiple   Pulse 71   Resp 17   Positioning HOB elevated 30 degrees   Education   $ Education 15 min;Other (see comment)  (O2 CHECK)   Respiratory Evaluation   $ Care Plan Tech Time 15 min   $ Eval/Re-eval Charges Re-evaluation

## 2023-12-03 LAB
ANION GAP SERPL CALC-SCNC: 5 MMOL/L (ref 8–16)
BACTERIA BLD CULT: NORMAL
BACTERIA BLD CULT: NORMAL
BUN SERPL-MCNC: 17 MG/DL (ref 6–20)
CALCIUM SERPL-MCNC: 8.8 MG/DL (ref 8.7–10.5)
CHLORIDE SERPL-SCNC: 108 MMOL/L (ref 95–110)
CO2 SERPL-SCNC: 25 MMOL/L (ref 23–29)
CREAT SERPL-MCNC: 1.1 MG/DL (ref 0.5–1.4)
ERYTHROCYTE [DISTWIDTH] IN BLOOD BY AUTOMATED COUNT: 13.5 % (ref 11.5–14.5)
EST. GFR  (NO RACE VARIABLE): >60 ML/MIN/1.73 M^2
GLUCOSE SERPL-MCNC: 77 MG/DL (ref 70–110)
GLUCOSE SERPL-MCNC: 79 MG/DL (ref 70–110)
GLUCOSE SERPL-MCNC: 83 MG/DL (ref 70–110)
GLUCOSE SERPL-MCNC: 95 MG/DL (ref 70–110)
GLUCOSE SERPL-MCNC: 99 MG/DL (ref 70–110)
HCT VFR BLD AUTO: 32.1 % (ref 40–54)
HGB BLD-MCNC: 10.6 G/DL (ref 14–18)
MAGNESIUM SERPL-MCNC: 1.6 MG/DL (ref 1.6–2.6)
MCH RBC QN AUTO: 34.1 PG (ref 27–31)
MCHC RBC AUTO-ENTMCNC: 33 G/DL (ref 32–36)
MCV RBC AUTO: 103 FL (ref 82–98)
PHOSPHATE SERPL-MCNC: 2.9 MG/DL (ref 2.7–4.5)
PLATELET # BLD AUTO: 349 K/UL (ref 150–450)
PMV BLD AUTO: 10.4 FL (ref 9.2–12.9)
POTASSIUM SERPL-SCNC: 4.1 MMOL/L (ref 3.5–5.1)
RBC # BLD AUTO: 3.11 M/UL (ref 4.6–6.2)
SODIUM SERPL-SCNC: 138 MMOL/L (ref 136–145)
WBC # BLD AUTO: 11 K/UL (ref 3.9–12.7)

## 2023-12-03 PROCEDURE — 25000003 PHARM REV CODE 250: Performed by: INTERNAL MEDICINE

## 2023-12-03 PROCEDURE — 84100 ASSAY OF PHOSPHORUS: CPT | Performed by: INTERNAL MEDICINE

## 2023-12-03 PROCEDURE — 80285 DRUG ASSAY VORICONAZOLE: CPT | Performed by: INTERNAL MEDICINE

## 2023-12-03 PROCEDURE — 25000242 PHARM REV CODE 250 ALT 637 W/ HCPCS: Performed by: HOSPITALIST

## 2023-12-03 PROCEDURE — 80048 BASIC METABOLIC PNL TOTAL CA: CPT | Performed by: INTERNAL MEDICINE

## 2023-12-03 PROCEDURE — 99232 PR SUBSEQUENT HOSPITAL CARE,LEVL II: ICD-10-PCS | Mod: ,,, | Performed by: INTERNAL MEDICINE

## 2023-12-03 PROCEDURE — 94640 AIRWAY INHALATION TREATMENT: CPT

## 2023-12-03 PROCEDURE — 25000003 PHARM REV CODE 250: Performed by: STUDENT IN AN ORGANIZED HEALTH CARE EDUCATION/TRAINING PROGRAM

## 2023-12-03 PROCEDURE — 93005 ELECTROCARDIOGRAM TRACING: CPT | Performed by: GENERAL PRACTICE

## 2023-12-03 PROCEDURE — 93010 EKG 12-LEAD: ICD-10-PCS | Mod: ,,, | Performed by: GENERAL PRACTICE

## 2023-12-03 PROCEDURE — 94799 UNLISTED PULMONARY SVC/PX: CPT

## 2023-12-03 PROCEDURE — 36415 COLL VENOUS BLD VENIPUNCTURE: CPT | Performed by: INTERNAL MEDICINE

## 2023-12-03 PROCEDURE — 99900031 HC PATIENT EDUCATION (STAT)

## 2023-12-03 PROCEDURE — 93010 ELECTROCARDIOGRAM REPORT: CPT | Mod: ,,, | Performed by: GENERAL PRACTICE

## 2023-12-03 PROCEDURE — 94760 N-INVAS EAR/PLS OXIMETRY 1: CPT

## 2023-12-03 PROCEDURE — 83735 ASSAY OF MAGNESIUM: CPT | Performed by: INTERNAL MEDICINE

## 2023-12-03 PROCEDURE — 30000890 LABCORP MISCELLANEOUS TEST: Performed by: INTERNAL MEDICINE

## 2023-12-03 PROCEDURE — 99900035 HC TECH TIME PER 15 MIN (STAT)

## 2023-12-03 PROCEDURE — 99232 SBSQ HOSP IP/OBS MODERATE 35: CPT | Mod: ,,, | Performed by: INTERNAL MEDICINE

## 2023-12-03 PROCEDURE — 63600175 PHARM REV CODE 636 W HCPCS: Performed by: INTERNAL MEDICINE

## 2023-12-03 PROCEDURE — 94761 N-INVAS EAR/PLS OXIMETRY MLT: CPT

## 2023-12-03 PROCEDURE — 12000002 HC ACUTE/MED SURGE SEMI-PRIVATE ROOM

## 2023-12-03 PROCEDURE — 85027 COMPLETE CBC AUTOMATED: CPT | Performed by: INTERNAL MEDICINE

## 2023-12-03 RX ORDER — HYDROCORTISONE 1 %
CREAM (GRAM) TOPICAL 2 TIMES DAILY
Status: DISCONTINUED | OUTPATIENT
Start: 2023-12-03 | End: 2023-12-04 | Stop reason: HOSPADM

## 2023-12-03 RX ORDER — IPRATROPIUM BROMIDE 0.5 MG/2.5ML
0.5 SOLUTION RESPIRATORY (INHALATION) EVERY 6 HOURS PRN
Status: DISCONTINUED | OUTPATIENT
Start: 2023-12-03 | End: 2023-12-04 | Stop reason: HOSPADM

## 2023-12-03 RX ORDER — LEVALBUTEROL INHALATION SOLUTION 1.25 MG/3ML
1.25 SOLUTION RESPIRATORY (INHALATION) EVERY 6 HOURS PRN
Status: DISCONTINUED | OUTPATIENT
Start: 2023-12-03 | End: 2023-12-04 | Stop reason: HOSPADM

## 2023-12-03 RX ADMIN — COLESEVELAM HYDROCHLORIDE 1875 MG: 625 TABLET, COATED ORAL at 05:12

## 2023-12-03 RX ADMIN — IPRATROPIUM BROMIDE 0.5 MG: 0.5 SOLUTION RESPIRATORY (INHALATION) at 09:12

## 2023-12-03 RX ADMIN — OXYCODONE AND ACETAMINOPHEN 1 TABLET: 5; 325 TABLET ORAL at 08:12

## 2023-12-03 RX ADMIN — CHLORHEXIDINE GLUCONATE 15 ML: 1.2 RINSE ORAL at 08:12

## 2023-12-03 RX ADMIN — BUSPIRONE HYDROCHLORIDE 2.5 MG: 5 TABLET ORAL at 08:12

## 2023-12-03 RX ADMIN — LEVALBUTEROL HYDROCHLORIDE 1.25 MG: 1.25 SOLUTION RESPIRATORY (INHALATION) at 09:12

## 2023-12-03 RX ADMIN — VANCOMYCIN HYDROCHLORIDE 125 MG: KIT at 08:12

## 2023-12-03 RX ADMIN — DIPHENHYDRAMINE HYDROCHLORIDE 25 MG: 25 CAPSULE ORAL at 08:12

## 2023-12-03 RX ADMIN — HYDROXYZINE HYDROCHLORIDE 25 MG: 25 TABLET ORAL at 05:12

## 2023-12-03 RX ADMIN — ZOLPIDEM TARTRATE 10 MG: 5 TABLET, COATED ORAL at 08:12

## 2023-12-03 RX ADMIN — PANTOPRAZOLE SODIUM 40 MG: 40 TABLET, DELAYED RELEASE ORAL at 08:12

## 2023-12-03 RX ADMIN — COLESEVELAM HYDROCHLORIDE 1875 MG: 625 TABLET, COATED ORAL at 08:12

## 2023-12-03 RX ADMIN — VORICONAZOLE 200 MG: 200 TABLET, FILM COATED ORAL at 03:12

## 2023-12-03 RX ADMIN — DICYCLOMINE HYDROCHLORIDE 10 MG: 10 CAPSULE ORAL at 08:12

## 2023-12-03 RX ADMIN — METOPROLOL SUCCINATE 50 MG: 50 TABLET, EXTENDED RELEASE ORAL at 08:12

## 2023-12-03 RX ADMIN — HYDROCORTISONE: 1 CREAM TOPICAL at 12:12

## 2023-12-03 RX ADMIN — ENOXAPARIN SODIUM 40 MG: 40 INJECTION SUBCUTANEOUS at 08:12

## 2023-12-03 RX ADMIN — HYDROCORTISONE: 1 CREAM TOPICAL at 08:12

## 2023-12-03 RX ADMIN — VORICONAZOLE 200 MG: 200 TABLET, FILM COATED ORAL at 08:12

## 2023-12-03 RX ADMIN — DICYCLOMINE HYDROCHLORIDE 10 MG: 10 CAPSULE ORAL at 03:12

## 2023-12-03 NOTE — SUBJECTIVE & OBJECTIVE
Interval History:  No acute overnight events.  Still has intermittent cough but reports blood-tinged decreasing.  Initially he stated he was ready for discharge but later in encounter stated he wishes to stay another night as he is apprehensive and wants to be monitored, states he lives at home.  Does have mild rash to right upper extremity.  Reports he has outpatient PCP appointment with Dr. Aguilar on 7.  Vitals stable.  On room air.  Hemoglobin 10.6, BUN/creatinine 17/1.1, EKG with  ms.  Discussed with patient.    Review of Systems   Constitutional:  Negative for fever.   Respiratory:  Positive for cough.    Cardiovascular:  Positive for chest pain.   Psychiatric/Behavioral:  Negative for confusion.      Objective:     Vital Signs (Most Recent):  Temp: 98.4 °F (36.9 °C) (12/03/23 0806)  Pulse: 71 (12/03/23 0806)  Resp: 16 (12/03/23 0839)  BP: 124/60 (12/03/23 0806)  SpO2: 96 % (12/03/23 0806) Vital Signs (24h Range):  Temp:  [98.1 °F (36.7 °C)-98.7 °F (37.1 °C)] 98.4 °F (36.9 °C)  Pulse:  [71-93] 71  Resp:  [16-18] 16  SpO2:  [94 %-96 %] 96 %  BP: ()/(51-84) 124/60     Weight: 129.3 kg (285 lb)  Body mass index is 47.43 kg/m².    Intake/Output Summary (Last 24 hours) at 12/3/2023 1050  Last data filed at 12/3/2023 0842  Gross per 24 hour   Intake 125 ml   Output --   Net 125 ml         Physical Exam  Vitals and nursing note reviewed.   Constitutional:       General: He is not in acute distress.     Appearance: He is obese. He is not ill-appearing.      Comments: Lying in bed   HENT:      Head: Normocephalic and atraumatic.      Mouth/Throat:      Mouth: Mucous membranes are moist.   Cardiovascular:      Rate and Rhythm: Normal rate and regular rhythm.   Pulmonary:      Effort: No respiratory distress.      Breath sounds: No wheezing.      Comments: On RA  Abdominal:      Palpations: Abdomen is soft.      Tenderness: There is no abdominal tenderness.   Skin:     General: Skin is warm.   Neurological:     "  Mental Status: He is alert.             Significant Labs: BMP:   Recent Labs   Lab 12/03/23  0553   GLU 83      K 4.1      CO2 25   BUN 17   CREATININE 1.1   CALCIUM 8.8   MG 1.6     CBC:   Recent Labs   Lab 12/02/23  0455 12/03/23  0553   WBC 11.66 11.00   HGB 11.3* 10.6*   HCT 34.9* 32.1*    349     CMP:   Recent Labs   Lab 12/02/23  0455 12/03/23  0553    138   K 4.2 4.1    108   CO2 23 25   GLU 82 83   BUN 23* 17   CREATININE 1.3 1.1   CALCIUM 8.9 8.8   ANIONGAP 7* 5*     Lactic Acid: No results for input(s): "LACTATE" in the last 48 hours.  Respiratory Culture: No results for input(s): "GSRESP", "RESPIRATORYC" in the last 48 hours.    Significant Imaging: I have reviewed all pertinent imaging results/findings within the past 24 hours.    CXR  FINDINGS:  Portable chest at 1016 11/30/2023 shows normal cardiomediastinal silhouette.     Patchy scattered alveolar opacities throughout the left lung and, to a lesser extent, in right lung, have not significantly changed, most conspicuous in left midlung. No pleural effusion or pneumothorax. Central pulmonary vasculature is normal. No acute osseous abnormality.     IMPRESSION:  Unchanged left greater than right patchy pulmonary alveolar opacities, seen to better advantage on 11/28/2023 CT.  "

## 2023-12-03 NOTE — PROGRESS NOTES
"[facility]  Department of Infectious Disease  Progress Note        PATIENT NAME: Jacoby Jean-Baptiste  MRN: [id number]  TODAY'S DATE: 12/03/2023  ADMIT DATE: 11/28/2023    PRINCIPLE PROBLEM: Pneumonia of both lungs due to infectious organism    REASON FOR CONSULT: Atypical pneumonia     INTERVAL HISTORY   12/3: Interim reviewed.  Patient desires observation for 1 night.  voriconAzole level drawn again this morning  at 5:00 a.m..  QT interval is 418 this morning this will be his 1st day without Zoloft.  He continues to scratch and cause excoriations on arms.  I can not see an untouched lesion of the rash he describes.  He is really unable to quantify the amount of blood in his sputum but it is likely only a streak.    12/2(Pooja)   : seen yesterday: "Reviewed lung biopsy results which show necrotizing granulomatous inflammation, special stains pending, no vasculitis.  Fungitell 137. Smears from bronch are negative on KOH x3   He is to have a voriconazole trough tomorrow, but I would increase dose to 400 mg po bid while we wait as his 4 mg/kg dose  would be 500 mg bid.   Sending rx to pharmacy downstairs to prevent authorization hold up."  Today, voriconazole level not drawn as a trough but was added to am labs. Increased the dose to 200 mg tid as I believe his weight and volume of distribution will demonstrate to be a factor in achieving high enough level. The voriconazole level will take several days to come back. Reviewed drug interactions and will need to decrease the buspar, drop the zoloft, follow the QTc(470 today). He coughed up a little blood and would like to stay another day. D/w Dr. Armas. Special stains on lung biopsy pending  .  Has an itchy rash the origin of which is unknown.      12/1@5000 (Manish):  Patient seen and examined alone in his room.  He was sitting up in bed awake and alert and does not appear in any distress.  He still complains of pain to his left upper chest area that is " worse with breathing.  A chest x-ray from yesterday showed a small apical left pneumothorax and bilateral interstitial lung opacities of the left lung base and a repeat showed slight interval decrease in size of tiny left apical pneumothorax.  No chest x-ray this morning.  He denies chest pain palpitations, nausea or vomiting, abdominal pain, headaches or dizziness.  He states he has chronic diarrhea that is no worse than usual.  He said he is eating and drinking well.  T-max 98.3°, WBC 12.37 trending down, platelets 366, BUN/CR 26/1.6, ALT/AST 14/16.  Creatinine clearance 70.  He has a voriconazole level pending tomorrow.  KOH prep with no yeast or fungal elements.  Sputum culture from bronch from 11/30 with many WBCs and rare Gram-positive cocci.  MRSA screen was negative, respiratory virus panel was negative, sputum culture from admission with many WBCs, rare Gram-negative rods and reduced normal respiratory marilu.  CRP 61.6, procalcitonin decreased at 1.878.  Fungitell is pending.  He continues on Zosyn, voriconazole and prophylactic oral vancomycin.    11/30:  Interim reviewed, patient seen examined at bedside, he is sitting in the chair.  He looks rested today, still complaining of malaise.  He is also complaining of mild left chest pain after bronch. Chest x-ray postprocedure with small apical pneumothorax.  Status post bronchoscopy this morning, discussed with Dr. Villarreal and procedure note reviewed; no endobronchial of mucosal abnormalities noted.  There was edema and thickening of the left upper lobe anterior bronchus.  2 transbronchial brushing and 3 transbronchial lung tissues biopsies taken.  Hemodynamically stable, afebrile.  Labs reviewed, white count leukocytosis down to 15.2, no left shift, H&H 11.7/35.9, .  CRP down to 61.6, procalcitonin down to 1.8, both trending down.  Fungitell in process.  Micro reviewed, so far no growth.  BAL wash KOH with no yeast or fungal elements seen, pending  cultures.  Stool for C diff negative.     Antibiotics (From admission, onward)      None            Antifungals (From admission, onward)      Start     Stop Route Frequency Ordered    12/02/23 1500  voriconazole tablet 200 mg        See Hyperspace for full Linked Orders Report.    -- Oral 3 times daily 12/02/23 1320             ASSESSMENT and PLAN     Sepsis secondary to cavitary/atypical pneumonia with necrosis + Aspergillus Ag s/p levaquin and doxy, not improved s/p bronch and biopsies 11/30 necrotizing granulomas  Aspergillus Ag 1.16 positive  11/22 TB Gold negative  -->265.5-->187.6-->61.6, trending down   Procal 17-->12-->4.8-->5.3-->1.8  Blood cultures x2 sets no growth to date,   MRSA nasal swab negative   RVP negative  Bronch cultures pending, fungitell pending  WBC improving, 17.31-->19.48-->15.20-->12.37   BUN/Creat 16/1.4-->13/1.2-->19/1.4-->26/1.6    11/22 Fungitell Positive 480--->137    2.  Oral thrush, improved     3. H/o UC on Remicade, immunocompromised              TB gold negative              PPD negative     4. 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  Follicular rash  6. PMHx:  Diabetes, HTN, HLD, anemia, smoker, anxiety/insomnia, hep B positive serology, GERD, UC on Remicade, last dose Oct/2023         Recommendations:    Follow bronchial wash cultures including Aspergillus antigen from BAL     Continue voriconazole 200mg 3 times a day  Check Vori level 12/2 ordered with AM labs  Oral vanco 125mg PO twice a day ppx for C diff while here  Follow Fungitell - previous Fungitell + from 11 days ago  Peridex twice a day   Aspiration precautions   Monitor renal function  [facility] tomorrow          SUBJECTIVE        Review of Systems  Review of systems obtained and negative except as stated above in Interval History      OBJECTIVE     Temp:  [97.6 °F (36.4 °C)-98.7 °F (37.1 °C)] 97.6 °F (36.4 °C)  Pulse:  [71-93] 72  Resp:  [16-18] 18  SpO2:  [94  %-96 %] 96 %  BP: ()/(51-84) 100/51    Physical Exam  General:  Obese, middle-aged male, sitting up in bed awake and alert, chronically ill appearing.  Eyes: Eyes with no icterus or injection. Vision grossly normal  Ears: Hearing grossly normal.  Nose: Nares patent  Mouth: Moist mucous membranes, dentition is poor. No ulcerations, erythema or exudates.  Neck: Supple, no tenderness to palpation.  Cardiovascular: Regular rate and rhythm, no murmurs, no edema.    Respiratory:  Clear to auscultation bilaterally anterior and posterior, diminished bases, no tachypnea or increased work of breathing.  He is on room air. Tender at RU chest to aplpation  Gastrointestinal:  Soft and obese with active bowel sounds, no tenderness to palpation, no distention.  Genitourinary:  No suprapubic tenderness.  Musculoskeletal: Moves all extremities with equal, good strength.    Skin: Pale, warm and dry, follicular rash, excoriated so initial path unclear  Neuro: Oriented, conversant, follows commands.  Psych: Good mood, flat affect.  VAD: PIV         Wounds: None  VAD: PIV  ISOLATION: None    CBC LAST 7 DAYS  Recent Labs   Lab 11/28/23  1835 11/29/23  1301 11/30/23  0421 12/01/23  0601 12/02/23  0455 12/03/23  0553   WBC 17.31* 19.48*  19.48* 15.20* 12.37 11.66 11.00   RBC 3.72* 3.69*  3.69* 3.48* 3.57* 3.30* 3.11*   HGB 12.6* 12.7*  12.7* 11.7* 11.9* 11.3* 10.6*   HCT 38.3* 38.6*  38.6* 35.9* 38.0* 34.9* 32.1*   * 105*  105* 103* 106* 106* 103*   MCH 33.9* 34.4*  34.4* 33.6* 33.3* 34.2* 34.1*   MCHC 32.9 32.9  32.9 32.6 31.3* 32.4 33.0   RDW 13.5 13.7  13.7 13.8 13.8 13.5 13.5    361  361 353 366 333 349   MPV 9.8 9.8  9.8 9.9 10.2 10.5 10.4   GRAN 73.1*  12.7* 75.7*  75.7*  14.7*  14.7* 70.5  10.7* 66.8  8.3* 67.8  7.9*  --    LYMPH 11.7*  2.0 10.9*  10.9*  2.1  2.1 13.6*  2.1 18.5  2.3 15.3*  1.8  --    MONO 9.0  1.6* 9.2  9.2  1.8*  1.8* 10.1  1.5* 8.9  1.1* 10.5  1.2*  --    BASO  "0.12 0.08  0.08 0.10 0.10 0.11  --    NRBC 0 0  0 0 0 0  --          CHEMISTRY LAST 7 DAYS  Recent Labs   Lab 11/28/23  1835 11/29/23  1301 11/30/23  0421 12/01/23  0601 12/02/23  0455 12/03/23  0553    136  136 136 138 137 138   K 4.3 3.9  3.9 4.2 4.4 4.2 4.1    104  104 104 106 107 108   CO2 24 26  26 25 24 23 25   ANIONGAP 7* 6*  6* 7* 8 7* 5*   BUN 16 13  13 19 26* 23* 17   CREATININE 1.4 1.2  1.2 1.4 1.6* 1.3 1.1   GLU 91 92  92 92 74 82 83   CALCIUM 9.3 9.4  9.4 9.1 9.3 8.9 8.8   MG  --   --   --  1.5* 1.9 1.6   ALBUMIN 3.4* 3.6  --  3.3*  --   --    PROT 8.3 8.4  --  7.6  --   --    ALKPHOS 60 64  --  54*  --   --    ALT 20 18  --  14  --   --    AST 25 16  --  16  --   --    BILITOT 0.5 0.6  --  0.5  --   --          Estimated Creatinine Clearance: 101.8 mL/min (based on SCr of 1.1 mg/dL).    INFLAMMATORY/PROCAL  LAST 7 DAYS  Recent Labs   Lab 11/28/23  1835 11/30/23  0421 11/30/23  0835   PROCAL 5.349*  --  1.878*   CRP  --  61.6*  --        No results found for: "ESR"  CRP   Date Value Ref Range Status   11/30/2023 61.6 (H) 0.0 - 8.2 mg/L Final   11/22/2023 187.6 (H) 0.0 - 8.2 mg/L Final   11/20/2023 265.5 (H) 0.0 - 8.2 mg/L Final   11/17/2023 228.2 (H) 0.0 - 8.2 mg/L Final   07/25/2023 0.46 <0.76 mg/dL Final   04/06/2023 11.55 (H) <0.76 mg/dL Final   01/11/2023 20.88 (H) <0.76 mg/dL Final       PRIOR  MICROBIOLOGY:      LAST 7 DAYS MICROBIOLOGY   Microbiology Results (last 7 days)       Procedure Component Value Units Date/Time    Blood Culture #2 **CANNOT BE ORDERED STAT** [[id number]] Collected: 11/28/23 2021    Order Status: Completed Specimen: Blood Updated: 12/02/23 2232     Blood Culture, Routine No Growth to date      No Growth to date      No Growth to date      No Growth to date      No Growth to date    Blood Culture #1 **CANNOT BE ORDERED STAT** [[id number]] Collected: 11/28/23 2010    Order Status: Completed Specimen: Blood from Antecubital, Right Updated: 12/02/23 2232 "     Blood Culture, Routine No Growth to date      No Growth to date      No Growth to date      No Growth to date      No Growth to date    AFB Culture & Smear [[id number]] Collected: 11/30/23 1153    Order Status: Completed Specimen: Biopsy from Bronchial Wash Updated: 12/02/23 1535     AFB CULTURE STAIN No acid fast bacilli seen.     AFB CULTURE STAIN Testing performed by:     AFB CULTURE STAIN [facility]     AFB CULTURE STAIN [address]     [place] [address]     AFB CULTURE STAIN Dr.Brian Marge MD    Narrative:      [facility] Biopsy    AFB Culture & Smear [[id number]] Collected: 11/30/23 1153    Order Status: Completed Specimen: Respiratory from Bronchial Wash Updated: 12/02/23 1535     AFB CULTURE STAIN No acid fast bacilli seen.     AFB CULTURE STAIN Testing performed by:     AFB CULTURE STAIN [facility]     AFB CULTURE STAIN [address]     [place] [address]     AFB CULTURE STAIN Dr.Brian Marge MD    Narrative:      Bronchial [place]    AFB Culture & Smear [[id number]] Collected: 11/30/23 1153    Order Status: Completed Specimen: Respiratory from Bronchial Wash Updated: 12/02/23 1534     AFB CULTURE STAIN No acid fast bacilli seen.     AFB CULTURE STAIN Testing performed by:     AFB CULTURE STAIN [facility] [place]     AFB CULTURE STAIN [address] [facility]     [place] [address]     AFB CULTURE STAIN Dr.Brian Marge MD    Narrative:      Bronchial Wash    Culture, Respiratory with Gram Stain [[id number]] Collected: 11/30/23 1153    Order Status: Completed Specimen: Respiratory from Bronchial Wash Updated: 12/02/23 1144     Respiratory Culture No normal respiratory marilu     Gram Stain (Respiratory) <10 epithelial cells per low power field.     Gram Stain (Respiratory) Many WBC's     Gram Stain (Respiratory) Rare Gram positive cocci    Narrative:      Bronchial Wash    Culture,  Respiratory with Gram Stain [[id number]] Collected: 11/29/23 1118    Order Status: Completed Specimen: Respiratory from Sputum Updated: 12/01/23 1205     Respiratory Culture Reduced normal respiratory marilu     Gram Stain (Respiratory) <10 epithelial cells per low power field.     Gram Stain (Respiratory) Many WBC's     Gram Stain (Respiratory) Rare Gram negative rods    KOH prep [[id number]] Collected: 11/30/23 1153    Order Status: Completed Specimen: Biopsy from Bronchial Wash Updated: 11/30/23 1448     KOH Prep No yeast or fungal elements seen    Narrative:      [facility] Biopsy    FANNY prep [[id number]] Collected: 11/30/23 1153    Order Status: Completed Specimen: Respiratory from Bronchial Wash Updated: 11/30/23 1438     KOH Prep No yeast or fungal elements seen    Narrative:      Bronchial Wash    FANNY prep [[id number]] Collected: 11/30/23 1153    Order Status: Completed Specimen: Respiratory from Bronchial Wash Updated: 11/30/23 1434     KOH Prep No yeast or fungal elements seen    Narrative:      Bronchial Brush     Fungus culture [[id number]] Collected: 11/30/23 1153    Order Status: Sent Specimen: Respiratory from Bronchial Wash Updated: 11/30/23 1204    Fungus culture [[id number]] Collected: 11/30/23 1153    Order Status: Sent Specimen: Respiratory from Bronchial Wash Updated: 11/30/23 1204    Fungus culture [[id number]] Collected: 11/30/23 1153    Order Status: Sent Specimen: Respiratory from Bronchial Wash Updated: 11/30/23 1203    Clostridium difficile EIA [[id number]] Collected: 11/29/23 1118    Order Status: Completed Specimen: Stool Updated: 11/29/23 1453     C. diff Antigen Negative     C difficile Toxins A+B, EIA Negative     Comment: Testing not recommended for children <24 months old.       Respiratory Infection Panel (PCR), Nasopharyngeal [[id number]] Collected: 11/28/23 2258    Order Status: Completed Specimen: Nasopharyngeal Swab Updated: 11/29/23 0611     Respiratory Infection Panel Source NP  swab     Adenovirus Not Detected     Coronavirus 229E, Common Cold Virus Not Detected     Coronavirus HKU1, Common Cold Virus Not Detected     Coronavirus NL63, Common Cold Virus Not Detected     Coronavirus OC43, Common Cold Virus Not Detected     Comment: Coronavirus strains 229E, HKU1, NL63, and OC43 can cause the common   cold   and are not associated with the respiratory disease outbreak caused   by  the COVID-19 (SARS-CoV-2 novel Coronavirus) strain.           SARS-CoV2 (COVID-19) Qualitative PCR Not Detected     Human Metapneumovirus Not Detected     Human Rhinovirus/Enterovirus Not Detected     Influenza A (subtypes H1, H1-2009,H3) Not Detected     Influenza B Not Detected     Parainfluenza Virus 1 Not Detected     Parainfluenza Virus 2 Not Detected     Parainfluenza Virus 3 Not Detected     Parainfluenza Virus 4 Not Detected     Respiratory Syncytial Virus Not Detected     Bordetella Parapertussis (PX3670) Not Detected     Bordetella pertussis (ptxP) Not Detected     Chlamydia pneumoniae Not Detected     Mycoplasma pneumoniae Not Detected     Comment: Respiratory Infection Panel testing performed by Multiplex PCR.       Narrative:      Respiratory Infection Panel source->NP Swab    MRSA Screen by PCR [[id number]] Collected: 11/28/23 2258    Order Status: Completed Specimen: Nasopharyngeal Swab from Nasal Updated: 11/29/23 0210     MRSA SCREEN BY PCR Negative            SUSCEPTIBILITY  Susceptibility data from last 90 days.  Collected Specimen Info Organism   11/19/23 Blood No growth after 5 days.   11/18/23 Blood No growth after 5 days.   11/17/23 Blood from Antecubital, Right Arm No growth after 5 days.         CURRENT/PREVIOUS VISIT EKG  Results for orders placed or performed during the hospital encounter of 11/28/23   EKG 12-lead    Collection Time: 12/03/23  7:16 AM    Narrative    Test Reason : Z78.9,    Vent. Rate : 068 BPM     Atrial Rate : 068 BPM     P-R Int : 142 ms          QRS Dur : 076 ms       QT Int : 394 ms       P-R-T Axes : 012 017 031 degrees     QTc Int : 418 ms    Normal sinus rhythm  Normal ECG  When compared with ECG of 28-NOV-2023 17:18,  Sinus rhythm has replaced Atrial flutter  Nonspecific T wave abnormality no longer evident in Anterior leads    Referred By: [email or web address]   SELF           Confirmed By:        Significant Labs: All pertinent labs within the past 24 hours have been reviewed.     Significant Imaging: I have reviewed all relevant and available imaging results/findings within the past 24 hours.    CXR latest: stable small apical pneumothorax       CT chest:   1.  Interval development of multiple areas of nodular consolidation throughout the lung parenchyma, some of which have central cavitation. Findings are suggestive of septic emboli. Necrotizing pneumonia could also have this appearance   2.  Hepatosplenomegaly with fatty infiltration of the liver.   3.  Small nonobstructing kidney stones bilaterally.     X-Ray Chest 1 View 11/30/23 1103  1. Small apical left pneumothorax. Findings reported to Dr. Lisa Villarreal at 11:19 AM.   2.  Bilateral interstitial lung opacities opacities of the left lung base.    X-Ray Chest 1 View 11/30/23 1426  Slight interval decrease in size of tiny left apical pneumothorax.   Unchanged patchy left-sided airspace opacities.        Paola Patton MD  Date of Service: 12/03/2023  5:00 PM

## 2023-12-03 NOTE — PROGRESS NOTES
[facility] Medicine  Progress Note    Patient Name: Jacoby Jean-Baptiste  MRN: [id number]  Patient Class: IP- Inpatient   Admission Date: 11/28/2023  Length of Stay: 5 days  Attending Physician: Moon Armas MD  Primary Care Provider: Hunter Aguilar III, MD        Subjective:     Principal Problem:Pneumonia of both lungs due to infectious organism        HPI:  48 year old male with a PMH Cdiff, UC on Remicade, DM2, anemia, tobacco use, HLD, and anxiety.     Patient recently discharged after hospital admission for shortness breath, chest pain abdominal pain associated with CT chest that showed patchy nodular infiltrates, one of which is cavitary in the left lower lobe, suspicion for septic emboli and/or necrosis .  Patient treated with antibiotics, prophylactic p.o. vancomycin.  BAL cultures negative. KOH and AFB cultures negative, MONSERRAT negative for IE.  Anti GBM was negative.  Patient was treated with IV Levaquin and doxycycline. PO vancomycin given for C diff prophylaxis.     Patient states he has not really gotten any better since being treated for his condition.  Some tests were pending after discharge.  His aspergillus antigen is positive at 1.16    Overview/Hospital Course:  Assumed care of this patient on 11/30/23.  Patient with history of morbid obesity with BMI 46, ulcerative colitis, followed by Dr. Jean, previously on Remicade, diabetes mellitus, recent ex-smoker 3 weeks, anxiety, history of SVT status post prior ablation.  Recent [facility] admission 11/17 to 11/23 with concern for cavitating pneumonia versus septic emboli with necrosis, underwent bronchoscopy, monserrat, no vegetation or atrial thrombus, discharge to complete course of antibiotics.  Re-presented as no improvement, CT with progressive changes, multifocal nodular consolidation with central cavity possible due to septic emboli, lymphadenopathy.  Seen by Pulmonary and Infectious Disease.  Currently on  piperacillin/tazobactam on voriconazole (aspergillus antigen 1.16).  EKG on 11/28 with concern for atrial flutter with 4-1, did recently have outpatient monitor, predominant rhythm sinus with brief runs of SVT, followed by cardiologist Dr. Roman.  On 11/30 underwent bronchoscopy with transbronchial biopsy submitted, procedure complicated by small apical pneumothorax.    Washings negative for malignancy but + inflammation.  Biopsy + necrotizing granulomatous inflammation, focal organizing chronic interstitial pneumonitis, no vasculitis, no organisms.  Infectious Disease recommends discharge on voriconazole 400 mg b.i.d., BuSpar to decreased to 2.5 mg daily, Zoloft 50 mg daily while on same, EKG in 3-4 days to monitor QTC, separate from colesevelam.  He has developed small volume bloody sputum today, is concerned about same, does not feel ready for discharge due to same.    Interval History:  No acute overnight events.  Still has intermittent cough but reports blood-tinged decreasing.  Initially he stated he was ready for discharge but later in encounter stated he wishes to stay another night as he is apprehensive and wants to be monitored, states he lives at home.  Does have mild rash to right upper extremity.  Reports he has outpatient PCP appointment with Dr. Aguilar on 7.  Vitals stable.  On room air.  Hemoglobin 10.6, BUN/creatinine 17/1.1, EKG with  ms.  Discussed with patient.    Review of Systems   Constitutional:  Negative for fever.   Respiratory:  Positive for cough.    Cardiovascular:  Positive for chest pain.   Psychiatric/Behavioral:  Negative for confusion.      Objective:     Vital Signs (Most Recent):  Temp: 98.4 °F (36.9 °C) (12/03/23 0806)  Pulse: 71 (12/03/23 0806)  Resp: 16 (12/03/23 0839)  BP: 124/60 (12/03/23 0806)  SpO2: 96 % (12/03/23 0806) Vital Signs (24h Range):  Temp:  [98.1 °F (36.7 °C)-98.7 °F (37.1 °C)] 98.4 °F (36.9 °C)  Pulse:  [71-93] 71  Resp:  [16-18] 16  SpO2:  [94 %-96 %]  "96 %  BP: ()/(51-84) 124/60     Weight: 129.3 kg (285 lb)  Body mass index is 47.43 kg/m².    Intake/Output Summary (Last 24 hours) at 12/3/2023 1050  Last data filed at 12/3/2023 0842  Gross per 24 hour   Intake 125 ml   Output --   Net 125 ml         Physical Exam  Vitals and nursing note reviewed.   Constitutional:       General: He is not in acute distress.     Appearance: He is obese. He is not ill-appearing.      Comments: Lying in bed   HENT:      Head: Normocephalic and atraumatic.      Mouth/Throat:      Mouth: Mucous membranes are moist.   Cardiovascular:      Rate and Rhythm: Normal rate and regular rhythm.   Pulmonary:      Effort: No respiratory distress.      Breath sounds: No wheezing.      Comments: On RA  Abdominal:      Palpations: Abdomen is soft.      Tenderness: There is no abdominal tenderness.   Skin:     General: Skin is warm.   Neurological:      Mental Status: He is alert.             Significant Labs: BMP:   Recent Labs   Lab 12/03/23  0553   GLU 83      K 4.1      CO2 25   BUN 17   CREATININE 1.1   CALCIUM 8.8   MG 1.6     CBC:   Recent Labs   Lab 12/02/23  0455 12/03/23  0553   WBC 11.66 11.00   HGB 11.3* 10.6*   HCT 34.9* 32.1*    349     CMP:   Recent Labs   Lab 12/02/23  0455 12/03/23  0553    138   K 4.2 4.1    108   CO2 23 25   GLU 82 83   BUN 23* 17   CREATININE 1.3 1.1   CALCIUM 8.9 8.8   ANIONGAP 7* 5*     Lactic Acid: No results for input(s): "LACTATE" in the last 48 hours.  Respiratory Culture: No results for input(s): "GSRESP", "RESPIRATORYC" in the last 48 hours.    Significant Imaging: I have reviewed all pertinent imaging results/findings within the past 24 hours.    CXR  FINDINGS:  Portable chest at 1016 11/30/2023 shows normal cardiomediastinal silhouette.     Patchy scattered alveolar opacities throughout the left lung and, to a lesser extent, in right lung, have not significantly changed, most conspicuous in left midlung. No pleural " effusion or pneumothorax. Central pulmonary vasculature is normal. No acute osseous abnormality.     IMPRESSION:  Unchanged left greater than right patchy pulmonary alveolar opacities, seen to better advantage on 11/28/2023 CT.    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]  Yes    Ulcerative colitis [K51.90]  Yes     Chronic    Anemia [D64.9]  Yes     Chronic    Anxiety [F41.9]  Yes    BMI 45.0-49.9, adult [Z68.42]  Not Applicable    Type 2 diabetes mellitus without complication, without long-term current use of insulin [E11.9]  Yes    H/O cardiac radiofrequency ablation [Z98.890]  Not Applicable    History of supraventricular tachycardia [Z86.79]  Not Applicable      Resolved Hospital Problems    Diagnosis Date Resolved POA    Leucocytosis [D72.829] 12/01/2023 Yes     Priority: 3     Hypomagnesemia [E83.42] 12/02/2023 No     Plan:  Continue care on medical floor   On 11/30 underwent transbronchial biopsy, washings negative for malignancy but + inflammation.  Biopsy + necrotizing granulomatous inflammation, focal organizing chronic interstitial pneumonitis, no vasculitis, no organisms,  procedure complicated by small apical pneumothorax   Repeat chest x-ray ordered and reviewed today  Continue voriconazole 200 mg t.i.d., prescription already delivered to bedside   While on voriconazole, BuSpar decreased to 2.5 daily, Zoloft discontinued, discussed with patient and agreeable, QTC today 418  ms  Recent KATJA with no atrial thrombus or vegetation  Ex-smoker 3 weeks, continue to reinforce smoking cessation counseling  Needs lifestyle modification for weight loss   Continue insulin with Accu-Cheks, as needed hypoglycemic measures   Remicade remains on hold, missed last dose due to ongoing pulmonary issues   Electrolytes sliding scale repletion  Intermittent lab checks  Appreciate all consultant's input   Discussed with patient if remains stable plan for discharge tomorrow  Further plan as per hospital course    VTE Risk Mitigation (From admission, onward)           Ordered     enoxaparin injection 40 mg  Every 12 hours         12/01/23 0954     IP VTE HIGH RISK PATIENT  Once         11/28/23 2159     Place sequential compression device  Until discontinued         11/28/23 2159                    Discharge Planning   GERARDO: 12/2/2023     Code Status: Full Code   Is the patient medically ready for discharge?:     Reason for patient still in hospital (select all that apply): Patient trending condition  Discharge Plan A: Home   Discharge Delays: None known at this time              Moon Armas MD  Department of Hospital Medicine   [facility]

## 2023-12-03 NOTE — CARE UPDATE
12/02/23 4400   Patient Assessment/Suction   Level of Consciousness (AVPU) alert   Respiratory Effort Normal;Unlabored   Expansion/Accessory Muscles/Retractions no use of accessory muscles;no retractions;expansion symmetric   All Lung Fields Breath Sounds clear   Rhythm/Pattern, Respiratory unlabored;pattern regular;depth regular;no shortness of breath reported   Cough Frequency infrequent   Cough Type nonproductive   PRE-TX-O2   Device (Oxygen Therapy) room air   SpO2 (!) 94 %   Pulse Oximetry Type Intermittent   $ Pulse Oximetry - Single Charge Pulse Oximetry - Single   Pulse 84   Resp 18   Positioning   Head of Bed (HOB) Positioning HOB at 30 degrees   Respiratory Evaluation   $ Care Plan Tech Time 15 min   $ Eval/Re-eval Charges Re-evaluation   Evaluation For New Orders

## 2023-12-04 VITALS
SYSTOLIC BLOOD PRESSURE: 153 MMHG | WEIGHT: 285.06 LBS | HEIGHT: 65 IN | OXYGEN SATURATION: 97 % | TEMPERATURE: 98 F | RESPIRATION RATE: 18 BRPM | DIASTOLIC BLOOD PRESSURE: 81 MMHG | BODY MASS INDEX: 47.49 KG/M2 | HEART RATE: 80 BPM

## 2023-12-04 PROBLEM — I76 SEPTIC EMBOLISM: Status: RESOLVED | Noted: 2023-12-01 | Resolved: 2023-12-04

## 2023-12-04 PROBLEM — J95.811 PNEUMOTHORAX AFTER BIOPSY: Status: RESOLVED | Noted: 2023-11-30 | Resolved: 2023-12-04

## 2023-12-04 LAB
GALACTOMANNAN AG SPEC IA-ACNC: 0.05 INDEX (ref 0–0.49)
GLUCOSE SERPL-MCNC: 83 MG/DL (ref 70–110)

## 2023-12-04 PROCEDURE — 25000003 PHARM REV CODE 250: Performed by: INTERNAL MEDICINE

## 2023-12-04 PROCEDURE — 25000003 PHARM REV CODE 250: Performed by: STUDENT IN AN ORGANIZED HEALTH CARE EDUCATION/TRAINING PROGRAM

## 2023-12-04 PROCEDURE — 63600175 PHARM REV CODE 636 W HCPCS: Performed by: INTERNAL MEDICINE

## 2023-12-04 PROCEDURE — 99900035 HC TECH TIME PER 15 MIN (STAT)

## 2023-12-04 PROCEDURE — 99233 SBSQ HOSP IP/OBS HIGH 50: CPT | Mod: ,,, | Performed by: STUDENT IN AN ORGANIZED HEALTH CARE EDUCATION/TRAINING PROGRAM

## 2023-12-04 PROCEDURE — 99233 PR SUBSEQUENT HOSPITAL CARE,LEVL III: ICD-10-PCS | Mod: ,,, | Performed by: STUDENT IN AN ORGANIZED HEALTH CARE EDUCATION/TRAINING PROGRAM

## 2023-12-04 PROCEDURE — 99900031 HC PATIENT EDUCATION (STAT)

## 2023-12-04 RX ORDER — VORICONAZOLE 50 MG/1
350 TABLET, FILM COATED ORAL 2 TIMES DAILY
Qty: 420 TABLET | Refills: 0 | Status: SHIPPED | OUTPATIENT
Start: 2023-12-04 | End: 2023-12-04 | Stop reason: SDUPTHER

## 2023-12-04 RX ORDER — COLESEVELAM 180 1/1
1875 TABLET ORAL 2 TIMES DAILY WITH MEALS
Qty: 180 TABLET | Refills: 0 | Status: ON HOLD | OUTPATIENT
Start: 2023-12-04 | End: 2024-03-16 | Stop reason: HOSPADM

## 2023-12-04 RX ORDER — HYDROXYZINE HYDROCHLORIDE 25 MG/1
25 TABLET, FILM COATED ORAL 3 TIMES DAILY PRN
Qty: 15 TABLET | Refills: 0 | Status: SHIPPED | OUTPATIENT
Start: 2023-12-04 | End: 2023-12-09

## 2023-12-04 RX ORDER — HYDROCODONE BITARTRATE AND ACETAMINOPHEN 5; 325 MG/1; MG/1
1 TABLET ORAL EVERY 8 HOURS PRN
Qty: 12 TABLET | Refills: 0 | Status: SHIPPED | OUTPATIENT
Start: 2023-12-04 | End: 2023-12-09

## 2023-12-04 RX ORDER — VORICONAZOLE 200 MG/1
200 TABLET, FILM COATED ORAL 3 TIMES DAILY
Qty: 90 TABLET | Refills: 0 | Status: SHIPPED | OUTPATIENT
Start: 2023-12-04 | End: 2023-12-04 | Stop reason: SDUPTHER

## 2023-12-04 RX ORDER — VORICONAZOLE 50 MG/1
400 TABLET, FILM COATED ORAL 2 TIMES DAILY
Qty: 480 TABLET | Refills: 0 | Status: SHIPPED | OUTPATIENT
Start: 2023-12-04 | End: 2024-02-08

## 2023-12-04 RX ORDER — BUSPIRONE HYDROCHLORIDE 5 MG/1
TABLET ORAL
Qty: 30 TABLET | Refills: 0 | Status: SHIPPED | OUTPATIENT
Start: 2023-12-04 | End: 2024-03-07 | Stop reason: DRUGHIGH

## 2023-12-04 RX ORDER — VORICONAZOLE 50 MG/1
150 TABLET, FILM COATED ORAL ONCE
Status: COMPLETED | OUTPATIENT
Start: 2023-12-04 | End: 2023-12-04

## 2023-12-04 RX ADMIN — DIPHENHYDRAMINE HYDROCHLORIDE 25 MG: 25 CAPSULE ORAL at 03:12

## 2023-12-04 RX ADMIN — DICYCLOMINE HYDROCHLORIDE 10 MG: 10 CAPSULE ORAL at 08:12

## 2023-12-04 RX ADMIN — PANTOPRAZOLE SODIUM 40 MG: 40 TABLET, DELAYED RELEASE ORAL at 08:12

## 2023-12-04 RX ADMIN — OXYCODONE AND ACETAMINOPHEN 1 TABLET: 5; 325 TABLET ORAL at 03:12

## 2023-12-04 RX ADMIN — BUSPIRONE HYDROCHLORIDE 2.5 MG: 5 TABLET ORAL at 08:12

## 2023-12-04 RX ADMIN — VORICONAZOLE 200 MG: 200 TABLET, FILM COATED ORAL at 08:12

## 2023-12-04 RX ADMIN — METOPROLOL SUCCINATE 50 MG: 50 TABLET, EXTENDED RELEASE ORAL at 08:12

## 2023-12-04 RX ADMIN — CHLORHEXIDINE GLUCONATE 15 ML: 1.2 RINSE ORAL at 08:12

## 2023-12-04 RX ADMIN — COLESEVELAM HYDROCHLORIDE 1875 MG: 625 TABLET, COATED ORAL at 08:12

## 2023-12-04 RX ADMIN — VORICONAZOLE 150 MG: 50 TABLET, FILM COATED ORAL at 11:12

## 2023-12-04 RX ADMIN — HYDROCORTISONE: 1 CREAM TOPICAL at 08:12

## 2023-12-04 RX ADMIN — ENOXAPARIN SODIUM 40 MG: 40 INJECTION SUBCUTANEOUS at 08:12

## 2023-12-04 RX ADMIN — HYDROXYZINE HYDROCHLORIDE 25 MG: 25 TABLET ORAL at 08:12

## 2023-12-04 NOTE — CARE UPDATE
12/04/23 0815   Patient Assessment/Suction   Level of Consciousness (AVPU) alert   Respiratory Effort Unlabored   Expansion/Accessory Muscles/Retractions expansion symmetric;no retractions;no use of accessory muscles   All Lung Fields Breath Sounds Anterior:;Lateral:;clear;diminished   Rhythm/Pattern, Respiratory depth regular;pattern regular;unlabored   PRE-TX-O2   Device (Oxygen Therapy) room air   SpO2 97 %   Pulse Oximetry Type Intermittent   Pulse 80   Resp 18   Aerosol Therapy   $ Aerosol Therapy Charges PRN treatment not required   Education   $ Education Bronchodilator;15 min   Respiratory Evaluation   $ Care Plan Tech Time 15 min

## 2023-12-04 NOTE — PROGRESS NOTES
"[facility]  Department of Infectious Disease  Progress Note        PATIENT NAME: Jacoby Jean-Baptiste  MRN: [id number]  TODAY'S DATE: 12/04/2023  ADMIT DATE: 11/28/2023    PRINCIPLE PROBLEM: Pneumonia of both lungs due to infectious organism    REASON FOR CONSULT: Atypical pneumonia     INTERVAL HISTORY     12/4 (Anastacio):  Interim reviewed, patient seen examined at bedside, he is sitting in the chair, awaiting discharge today.  Patient reports he feels a little better, he is scratching all over his arms and legs, advised not to do so.  Hemodynamically stable, afebrile, he is currently breathing comfortable on room air.  Labs reviewed no labs done today.  Discussed with patient, plan to continue voriconazole as follows 400 mg twice a day, will check a level Friday morning before morning dose.  He will follow at the ID office with any provider in 1-2 weeks to monitor and make sure voriconazole is  therapeutic.    12/3: Interim reviewed.  Patient desires observation for 1 night.  voriconAzole level drawn again this morning  at 5:00 a.m..  QT interval is 418 this morning this will be his 1st day without Zoloft.  He continues to scratch and cause excoriations on arms.  I can not see an untouched lesion of the rash he describes.  He is really unable to quantify the amount of blood in his sputum but it is likely only a streak.    12/2(Pooja)   : seen yesterday: "Reviewed lung biopsy results which show necrotizing granulomatous inflammation, special stains pending, no vasculitis.  Fungitell 137. Smears from bronch are negative on KOH x3   He is to have a voriconazole trough tomorrow, but I would increase dose to 400 mg po bid while we wait as his 4 mg/kg dose  would be 500 mg bid.   Sending rx to pharmacy downstairs to prevent authorization hold up."  Today, voriconazole level not drawn as a trough but was added to am labs. Increased the dose to 200 mg tid as I believe his weight and volume of distribution will " demonstrate to be a factor in achieving high enough level. The voriconazole level will take several days to come back. Reviewed drug interactions and will need to decrease the buspar, drop the zoloft, follow the QTc(470 today). He coughed up a little blood and would like to stay another day. D/w Dr. Armas. Special stains on lung biopsy pending  .  Has an itchy rash the origin of which is unknown.      12/1@0917 (Denette):  Patient seen and examined alone in his room.  He was sitting up in bed awake and alert and does not appear in any distress.  He still complains of pain to his left upper chest area that is worse with breathing.  A chest x-ray from yesterday showed a small apical left pneumothorax and bilateral interstitial lung opacities of the left lung base and a repeat showed slight interval decrease in size of tiny left apical pneumothorax.  No chest x-ray this morning.  He denies chest pain palpitations, nausea or vomiting, abdominal pain, headaches or dizziness.  He states he has chronic diarrhea that is no worse than usual.  He said he is eating and drinking well.  T-max 98.3°, WBC 12.37 trending down, platelets 366, BUN/CR 26/1.6, ALT/AST 14/16.  Creatinine clearance 70.  He has a voriconazole level pending tomorrow.  KOH prep with no yeast or fungal elements.  Sputum culture from bronch from 11/30 with many WBCs and rare Gram-positive cocci.  MRSA screen was negative, respiratory virus panel was negative, sputum culture from admission with many WBCs, rare Gram-negative rods and reduced normal respiratory marilu.  CRP 61.6, procalcitonin decreased at 1.878.  Fungitell is pending.  He continues on Zosyn, voriconazole and prophylactic oral vancomycin.    11/30:  Interim reviewed, patient seen examined at bedside, he is sitting in the chair.  He looks rested today, still complaining of malaise.  He is also complaining of mild left chest pain after bronch. Chest x-ray postprocedure with small apical  pneumothorax.  Status post bronchoscopy this morning, discussed with Dr. Villarreal and procedure note reviewed; no endobronchial of mucosal abnormalities noted.  There was edema and thickening of the left upper lobe anterior bronchus.  2 transbronchial brushing and 3 transbronchial lung tissues biopsies taken.  Hemodynamically stable, afebrile.  Labs reviewed, white count leukocytosis down to 15.2, no left shift, H&H 11.7/35.9, .  CRP down to 61.6, procalcitonin down to 1.8, both trending down.  Fungitell in process.  Micro reviewed, so far no growth.  BAL wash KOH with no yeast or fungal elements seen, pending cultures.  Stool for C diff negative.     Antifungals (From admission, onward)      Start     Stop Route Frequency Ordered    12/04/23 2100  voriconazole tablet 350 mg        See Hyperspace for full Linked Orders Report.    -- Oral 2 times daily 12/04/23 0910             ASSESSMENT and PLAN     Sepsis secondary to cavitary/atypical pneumonia with necrosis + Aspergillus Ag s/p levaquin and doxy, not improved s/p bronch and biopsies 11/30 necrotizing granulomas  Aspergillus Ag 1.16 positive  Fungitell Positive 480--->137  11/22 TB Gold negative  -->265.5-->187.6-->61.6, trending down   Procal 17-->12-->4.8-->5.3-->1.8  Blood cultures x2 sets no growth to date,   MRSA nasal swab negative   RVP negative  Bronch cultures pending, fungitell pending  WBC improving, 17.31-->19.48-->15.20-->12.37   BUN/Creat 16/1.4-->13/1.2-->19/1.4-->26/1.6        2.  Oral thrush, improved     3. H/o UC on Remicade, immunocompromised              TB gold negative              PPD negative     4. 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 Follicular rash - scratching    6. PMHx:  Diabetes, HTN, HLD, anemia, smoker, anxiety/insomnia, hep B positive serology, GERD, UC on Remicade, last dose Oct/2023         Recommendations:    Follow bronchial wash cultures including Aspergillus  antigen from BAL  Adjust voriconazole to 400 mg p.o. twice a day  Voriconazole level ordered for morning labs, patient will come to the hospital to get it done 12/8  Follow-up ID clinic/any provider in 1-2 weeks - office to set up appointment  Peridex twice a day   Aspiration precautions   Monitor renal function    Discussed with patient, nursing, Dr. Armas     Will sign off, call us back with any questions        SUBJECTIVE        Review of Systems  Review of systems obtained and negative except as stated above in Interval History      OBJECTIVE     Temp:  [97.9 °F (36.6 °C)-99.5 °F (37.5 °C)] 97.9 °F (36.6 °C)  Pulse:  [66-85] 80  Resp:  [18] 18  SpO2:  [95 %-99 %] 97 %  BP: (133-154)/(60-84) 153/81    Physical Exam  General:  Obese, middle-aged male, sitting up in the chair, looks a little more comfortable today, on room air  Eyes: Eyes with no icterus or injection. Vision grossly normal  Ears: Hearing grossly normal.  Nose: Nares patent  Mouth: Moist mucous membranes, dentition is very poor. No ulcerations, erythema or exudates.  Neck: Supple, no tenderness to palpation.  Cardiovascular: Regular rate and rhythm, no murmurs, no edema.    Respiratory:  Clear to auscultation bilaterally anterior and posterior, diminished bases, no tachypnea or increased work of breathing.  He is on room air. Tender at RU chest to aplpation  Gastrointestinal:  Soft and obese with active bowel sounds, no tenderness to palpation, no distention.  Genitourinary:  No suprapubic tenderness.  Musculoskeletal: Moves all extremities with equal, good strength.    Skin: Pale, warm and dry, follicular rash, multiple small abrasions, consistent with constant scratching/picking  Neuro: Oriented, conversant, follows commands.  Psych: Good mood, flat affect.  VAD: PIV         Wounds: None  VAD: PIV  ISOLATION: None    CBC LAST 7 DAYS  Recent Labs   Lab 11/28/23  1835 11/29/23  1301 11/30/23  0421 12/01/23  0601 12/02/23  0455 12/03/23  0553   WBC  "17.31* 19.48*  19.48* 15.20* 12.37 11.66 11.00   RBC 3.72* 3.69*  3.69* 3.48* 3.57* 3.30* 3.11*   HGB 12.6* 12.7*  12.7* 11.7* 11.9* 11.3* 10.6*   HCT 38.3* 38.6*  38.6* 35.9* 38.0* 34.9* 32.1*   * 105*  105* 103* 106* 106* 103*   MCH 33.9* 34.4*  34.4* 33.6* 33.3* 34.2* 34.1*   MCHC 32.9 32.9  32.9 32.6 31.3* 32.4 33.0   RDW 13.5 13.7  13.7 13.8 13.8 13.5 13.5    361  361 353 366 333 349   MPV 9.8 9.8  9.8 9.9 10.2 10.5 10.4   GRAN 73.1*  12.7* 75.7*  75.7*  14.7*  14.7* 70.5  10.7* 66.8  8.3* 67.8  7.9*  --    LYMPH 11.7*  2.0 10.9*  10.9*  2.1  2.1 13.6*  2.1 18.5  2.3 15.3*  1.8  --    MONO 9.0  1.6* 9.2  9.2  1.8*  1.8* 10.1  1.5* 8.9  1.1* 10.5  1.2*  --    BASO 0.12 0.08  0.08 0.10 0.10 0.11  --    NRBC 0 0  0 0 0 0  --          CHEMISTRY LAST 7 DAYS  Recent Labs   Lab 11/28/23  1835 11/29/23  1301 11/30/23  0421 12/01/23  0601 12/02/23  0455 12/03/23  0553    136  136 136 138 137 138   K 4.3 3.9  3.9 4.2 4.4 4.2 4.1    104  104 104 106 107 108   CO2 24 26  26 25 24 23 25   ANIONGAP 7* 6*  6* 7* 8 7* 5*   BUN 16 13  13 19 26* 23* 17   CREATININE 1.4 1.2  1.2 1.4 1.6* 1.3 1.1   GLU 91 92  92 92 74 82 83   CALCIUM 9.3 9.4  9.4 9.1 9.3 8.9 8.8   MG  --   --   --  1.5* 1.9 1.6   ALBUMIN 3.4* 3.6  --  3.3*  --   --    PROT 8.3 8.4  --  7.6  --   --    ALKPHOS 60 64  --  54*  --   --    ALT 20 18  --  14  --   --    AST 25 16  --  16  --   --    BILITOT 0.5 0.6  --  0.5  --   --          Estimated Creatinine Clearance: 101.8 mL/min (based on SCr of 1.1 mg/dL).    INFLAMMATORY/PROCAL  LAST 7 DAYS  Recent Labs   Lab 11/28/23  1835 11/30/23  0421 11/30/23  0835   PROCAL 5.349*  --  1.878*   CRP  --  61.6*  --        No results found for: "ESR"  CRP   Date Value Ref Range Status   11/30/2023 61.6 (H) 0.0 - 8.2 mg/L Final   11/22/2023 187.6 (H) 0.0 - 8.2 mg/L Final   11/20/2023 265.5 (H) 0.0 - 8.2 mg/L Final   11/17/2023 228.2 (H) 0.0 - 8.2 mg/L Final "   07/25/2023 0.46 <0.76 mg/dL Final   04/06/2023 11.55 (H) <0.76 mg/dL Final   01/11/2023 20.88 (H) <0.76 mg/dL Final       PRIOR  MICROBIOLOGY:      LAST 7 DAYS MICROBIOLOGY   Microbiology Results (last 7 days)       Procedure Component Value Units Date/Time    Blood Culture #2 **CANNOT BE ORDERED STAT** [[id number]] Collected: 11/28/23 2021    Order Status: Completed Specimen: Blood Updated: 12/03/23 2232     Blood Culture, Routine No growth after 5 days.    Blood Culture #1 **CANNOT BE ORDERED STAT** [[id number]] Collected: 11/28/23 2010    Order Status: Completed Specimen: Blood from Antecubital, Right Updated: 12/03/23 2232     Blood Culture, Routine No growth after 5 days.    AFB Culture & Smear [[id number]] Collected: 11/30/23 1153    Order Status: Completed Specimen: Biopsy from Bronchial Wash Updated: 12/02/23 1535     AFB CULTURE STAIN No acid fast bacilli seen.     AFB CULTURE STAIN Testing performed by:     [facility] CULTURE STAIN [facility]     AFB CULTURE STAIN [address]     [place] [address]     AFB CULTURE STAIN Dr.Brian Marge MD    Narrative:      [facility] Biopsy    AFB Culture & Smear [[id number]] Collected: 11/30/23 1153    Order Status: Completed Specimen: Respiratory from Bronchial Wash Updated: 12/02/23 1535     AFB CULTURE STAIN No acid fast bacilli seen.     AFB CULTURE STAIN Testing performed by:     [facility] CULTURE STAIN [facility]     AFB CULTURE STAIN [address]     [place] [address]     AFB CULTURE STAIN Dr.Brian Marge MD    Narrative:      Bronchial [place]    AFB Culture & Smear [[id number]] Collected: 11/30/23 1153    Order Status: Completed Specimen: Respiratory from Bronchial Wash Updated: 12/02/23 1534     AFB CULTURE STAIN No acid fast bacilli seen.     AFB CULTURE STAIN Testing performed by:     [facility] CULTURE STAIN [facility]     AFB CULTURE STAIN [address]     [place] [address]     AFB CULTURE STAIN Dr.Brian Marge MD    Narrative:      Bronchial Wash    Culture, Respiratory with Gram Stain [[id number]] Collected: 11/30/23 1153    Order Status: Completed Specimen: Respiratory from Bronchial Wash Updated: 12/02/23 1144     Respiratory Culture No normal respiratory marilu     Gram Stain (Respiratory) <10 epithelial cells per low power field.     Gram Stain (Respiratory) Many WBC's     Gram Stain (Respiratory) Rare Gram positive cocci    Narrative:      Bronchial Wash    Culture, Respiratory with Gram Stain [[id number]] Collected: 11/29/23 1118    Order Status: Completed Specimen: Respiratory from Sputum Updated: 12/01/23 1205     Respiratory Culture Reduced normal respiratory marilu     Gram Stain (Respiratory) <10 epithelial cells per low power field.     Gram Stain (Respiratory) Many WBC's     Gram Stain (Respiratory) Rare Gram negative rods    KOH prep [[id number]] Collected: 11/30/23 1153    Order Status: Completed Specimen: Biopsy from Bronchial Wash Updated: 11/30/23 1448     KOH Prep No yeast or fungal elements seen    Narrative:      [facility] Biopsy    FANNY prep [[id number]] Collected: 11/30/23 1153    Order Status: Completed Specimen: Respiratory from Bronchial Wash Updated: 11/30/23 1438     KOH Prep No yeast or fungal elements seen    Narrative:      Bronchial Wash    FANNY prep [[id number]] Collected: 11/30/23 1153    Order Status: Completed Specimen: Respiratory from Bronchial Wash Updated: 11/30/23 1434     KOH Prep No yeast or fungal elements seen    Narrative:      Bronchial Brush     Fungus culture [[id number]] Collected: 11/30/23 1153    Order Status: Sent Specimen: Respiratory from Bronchial Wash Updated: 11/30/23 1204    Fungus culture [[id number]] Collected: 11/30/23 1153    Order Status: Sent Specimen: Respiratory from Bronchial Wash Updated: 11/30/23 1204    Fungus culture [[id number]] Collected: 11/30/23 1153    Order Status: Sent  Specimen: Respiratory from Bronchial Wash Updated: 11/30/23 1203    Clostridium difficile EIA [[id number]] Collected: 11/29/23 1118    Order Status: Completed Specimen: Stool Updated: 11/29/23 1453     C. diff Antigen Negative     C difficile Toxins A+B, EIA Negative     Comment: Testing not recommended for children <24 months old.       Respiratory Infection Panel (PCR), Nasopharyngeal [[id number]] Collected: 11/28/23 2258    Order Status: Completed Specimen: Nasopharyngeal Swab Updated: 11/29/23 0611     Respiratory Infection Panel Source NP swab     Adenovirus Not Detected     Coronavirus 229E, Common Cold Virus Not Detected     Coronavirus HKU1, Common Cold Virus Not Detected     Coronavirus NL63, Common Cold Virus Not Detected     Coronavirus OC43, Common Cold Virus Not Detected     Comment: Coronavirus strains 229E, HKU1, NL63, and OC43 can cause the common   cold   and are not associated with the respiratory disease outbreak caused   by  the COVID-19 (SARS-CoV-2 novel Coronavirus) strain.           SARS-CoV2 (COVID-19) Qualitative PCR Not Detected     Human Metapneumovirus Not Detected     Human Rhinovirus/Enterovirus Not Detected     Influenza A (subtypes H1, H1-2009,H3) Not Detected     Influenza B Not Detected     Parainfluenza Virus 1 Not Detected     Parainfluenza Virus 2 Not Detected     Parainfluenza Virus 3 Not Detected     Parainfluenza Virus 4 Not Detected     Respiratory Syncytial Virus Not Detected     Bordetella Parapertussis (SJ5713) Not Detected     Bordetella pertussis (ptxP) Not Detected     Chlamydia pneumoniae Not Detected     Mycoplasma pneumoniae Not Detected     Comment: Respiratory Infection Panel testing performed by Multiplex PCR.       Narrative:      Respiratory Infection Panel source->NP Swab    MRSA Screen by PCR [[id number]] Collected: 11/28/23 2258    Order Status: Completed Specimen: Nasopharyngeal Swab from Nasal Updated: 11/29/23 0210     MRSA SCREEN BY PCR Negative             Fungitell Value                Comment          pg/mL    AMENY    137.037     SUSCEPTIBILITY  Susceptibility data from last 90 days.  Collected Specimen Info Organism   11/28/23 Blood No growth after 5 days.   11/28/23 Blood from Antecubital, Right No growth after 5 days.   11/19/23 Blood No growth after 5 days.   11/18/23 Blood No growth after 5 days.   11/17/23 Blood from Antecubital, Right Arm No growth after 5 days.         CURRENT/PREVIOUS VISIT EKG  Results for orders placed or performed during the hospital encounter of 11/28/23   EKG 12-lead    Collection Time: 12/03/23  7:16 AM    Narrative    Test Reason : Z78.9,    Vent. Rate : 068 BPM     Atrial Rate : 068 BPM     P-R Int : 142 ms          QRS Dur : 076 ms      QT Int : 394 ms       P-R-T Axes : 012 017 031 degrees     QTc Int : 418 ms    Normal sinus rhythm  Normal ECG  When compared with ECG of 28-NOV-2023 17:18,  Sinus rhythm has replaced Atrial flutter  Nonspecific T wave abnormality no longer evident in Anterior leads    Referred By: [email or web address]   SELF           Confirmed By:        Significant Labs: All pertinent labs within the past 24 hours have been reviewed.     Significant Imaging: I have reviewed all relevant and available imaging results/findings within the past 24 hours.    PATH REPORT:  12/1/23:  DIAGNOSIS:   12/01/2023 RDC/jr     LEFT UPPER LOBE OF LUNG AND LEFT LOWER LOBE OF LUNG BRONCHIAL AND TRANSBRONCHIAL BIOPSIES:   - FOCAL NECROTIZING GRANULOMATOUS INFLAMMATION.   - FOCAL ORGANIZING CHRONIC INTERSTITIAL PNEUMONITIS.   - MICROORGANISMS WERE NOT IDENTIFIED BY ROUTINE STAIN.   - BRONCHIAL MUCOSA WITH CHRONIC INFLAMMATION.   - NEGATIVE FOR DYSPLASIA OR MALIGNANCY.   - NEGATIVE FOR VASCULITIS.     Note: Special stains for the identification of acid fast bacilli and  fungus will be obtained. Addendum report will follow.     CXR latest: stable small apical pneumothorax       CT chest:   1.  Interval development of multiple areas of  nodular consolidation throughout the lung parenchyma, some of which have central cavitation. Findings are suggestive of septic emboli. Necrotizing pneumonia could also have this appearance   2.  Hepatosplenomegaly with fatty infiltration of the liver.   3.  Small nonobstructing kidney stones bilaterally.     X-Ray Chest 1 View 11/30/23 1103  1. Small apical left pneumothorax. Findings reported to Dr. Lisa Villarreal at 11:19 AM.   2.  Bilateral interstitial lung opacities opacities of the left lung base.    X-Ray Chest 1 View 11/30/23 1426  Slight interval decrease in size of tiny left apical pneumothorax.   Unchanged patchy left-sided airspace opacities.        Ophelia Galindo MD  Date of Service: 12/04/2023

## 2023-12-04 NOTE — PROGRESS NOTES
"[facility]  Adult Nutrition   Progress Note (Follow-Up)    SUMMARY     Recommendations  Recommendation/Intervention: 1. Monitor patient to ensure diet tolerance.  Goals: 1. Patient tolerates new diet order  Nutrition Goal Status: progressing towards goal  Communication of RD Recs: other (comment) (RD saw patient for nutrition education consult on 12/01/23,)    PES: No nutrition diagnosis at this time (NO-1.1)    Dietitian Rounds Brief  Pt. Is a 48 y.o. male admitted on 11/28/2023 with pneumonia of both lungs due to an infectious organism. Pt. Was seen by the dietitian on 12/01/2023 for diet education consult over cardiac and diabetic diet. The patient is now on a regular diet. Discharge order on chart. Last BM was 12/03/2023.      Diet order:   Current Diet Order: Regular Diet IDDSI Level 7         Evaluation of Received Nutrient/Fluid Intake  Total Calories (kcal): 2908.69  Total Calories (kcal/kg): 22.5  Total Protein (gm): 108.19  Total Protein (gm/kg): 1.75  Total Fluid Intake (mL): 2585.5     % Intake of Estimated Energy Needs: 75 - 100 %  % Meal Intake: 75 - 100 %      Intake/Output Summary (Last 24 hours) at 12/4/2023 0958  Last data filed at 12/3/2023 1718  Gross per 24 hour   Intake 339 ml   Output 6 ml   Net 333 ml        Anthropometrics  Temp: 97.9 °F (36.6 °C)  Height Method: Stated  Height: 5' 5" (165.1 cm)  Height (inches): 65 in  Weight Method: Bed Scale  Weight: 129.3 kg (285 lb 0.9 oz)  Weight (lb): 285.06 lb  Ideal Body Weight (IBW), Male: 136 lb  % Ideal Body Weight, Male (lb): 209.6 %  BMI (Calculated): 47.4  BMI Grade: greater than 40 - morbid obesity       Estimated/Assessed Needs  Weight Used For Calorie Calculations: 129.3 kg (285 lb)  Energy Calorie Requirements (kcal): 2585.5-3231.88 kcal (20-25 kcal/kg)  Energy Need Method: Kcal/kg  Protein Requirements: 92..64 g (1.5-2 g/kg IBW)  Weight Used For Protein Calculations: 61.7 kg (136 lb)  Fluid Requirements (mL): " 2585.5  Estimated Fluid Requirement Method: RDA Method  RDA Method (mL): 2585.5       Reason for Assessment  Reason For Assessment: RD follow-up  Diagnosis: pulmonary disease (pneumonia of both lungs due to infectious organism)  Relevant Medical History: type 2 diabetes mellitus, anxiety, leucocytosis, ulcerative colitis, hepatosplenomegaly, anemia, septic embolism, chest pain, aspergillosis  Interdisciplinary Rounds: did not attend    Nutrition/Diet History  Patient Reported Diet/Restrictions/Preferences: general (Regular diet (IDDSI Level 7))  Typical Food/Fluid Intake: 100%  Spiritual, Cultural Beliefs, Hinduism Practices, Values that Affect Care: no  Food Allergies: NKFA  Factors Affecting Nutritional Intake: None identified at this time    Nutrition Risk Screen  Nutrition Risk Screen: no indicators present     MST Score: 0  Have you recently lost weight without trying?: No  Weight loss score: 0  Have you been eating poorly because of a decreased appetite?: No  Appetite score: 0       Weight History:  Wt Readings from Last 10 Encounters:   12/04/23 129.3 kg (285 lb 0.9 oz)   11/23/23 132.8 kg (292 lb 12.3 oz)   11/17/23 (!) 136.1 kg (300 lb 0.7 oz)   10/30/23 (!) 138 kg (304 lb 5.5 oz)   07/14/23 126.6 kg (279 lb)   05/30/23 110.2 kg (243 lb)   05/23/23 106.6 kg (235 lb)   04/28/23 108.1 kg (238 lb 6.4 oz)   04/20/23 104.7 kg (230 lb 13.2 oz)   03/21/23 111.8 kg (246 lb 7.6 oz)        Lab/Procedures/Meds: Pertinent Labs/Meds Reviewed    Medications:Pertinent Medications Reviewed  Scheduled Meds:   busPIRone  2.5 mg Oral Daily    chlorhexidine  15 mL Mouth/Throat BID    colesevelam  1,875 mg Oral BID WM    dicyclomine  10 mg Oral TID    enoxparin  40 mg Subcutaneous Q12H (prophylaxis, 0900/2100)    hydrocortisone   Topical (Top) BID    metoprolol succinate  50 mg Oral Daily    naloxone  0.02 mg Intravenous Once    pantoprazole  40 mg Oral BID    voriconazole  150 mg Oral Once    voriconazole  350 mg Oral BID  "    Continuous Infusions:  PRN Meds:.acetaminophen, dextrose 50%, dextrose 50%, dextrose 50%, dextrose 50%, diphenhydrAMINE, diphenhydrAMINE, fentaNYL, glucagon (human recombinant), glucagon (human recombinant), glucose, glucose, glucose, glucose, hydrOXYzine HCL, insulin aspart U-100, levalbuterol **AND** ipratropium, naloxone, ondansetron, ondansetron, oxyCODONE-acetaminophen, sodium chloride 0.9%, zolpidem    Labs: Pertinent Labs Reviewed  Clinical Chemistry:  Recent Labs   Lab 11/28/23  1835 11/29/23  1301 11/30/23  0421 12/01/23  0601 12/02/23  0455 12/03/23  0553    136  136   < > 138   < > 138   K 4.3 3.9  3.9   < > 4.4   < > 4.1    104  104   < > 106   < > 108   CO2 24 26  26   < > 24   < > 25   GLU 91 92  92   < > 74   < > 83   BUN 16 13  13   < > 26*   < > 17   CREATININE 1.4 1.2  1.2   < > 1.6*   < > 1.1   CALCIUM 9.3 9.4  9.4   < > 9.3   < > 8.8   PROT 8.3 8.4  --  7.6  --   --    ALBUMIN 3.4* 3.6  --  3.3*  --   --    BILITOT 0.5 0.6  --  0.5  --   --    ALKPHOS 60 64  --  54*  --   --    AST 25 16  --  16  --   --    ALT 20 18  --  14  --   --    ANIONGAP 7* 6*  6*   < > 8   < > 5*   MG  --   --   --  1.5*   < > 1.6   PHOS  --   --   --   --   --  2.9    < > = values in this interval not displayed.     CBC:   Recent Labs   Lab 12/03/23  0553   WBC 11.00   RBC 3.11*   HGB 10.6*   HCT 32.1*      *   MCH 34.1*   MCHC 33.0     Lipid Panel:  No results for input(s): "CHOL", "HDL", "LDLCALC", "TRIG", "CHOLHDL" in the last 168 hours.  Cardiac Profile:  Recent Labs   Lab 11/28/23  1835   BNP 7     Inflammatory Labs:  Recent Labs   Lab 11/30/23  0421   CRP 61.6*     Diabetes:  No results for input(s): "HGBA1C", "POCTGLUCOSE" in the last 168 hours.  Thyroid & Parathyroid:  No results for input(s): "TSH", "FREET4", "Y4VKQJO", "X6TYCZX", "THYROIDAB" in the last 168 hours.    Monitor and Evaluation  Food and Nutrient Intake: food and beverage intake, energy intake  Food and " Nutrient Adminstration: diet order  Knowledge/Beliefs/Attitudes: food and nutrition knowledge/skill  Physical Activity and Function: nutrition-related ADLs and IADLs  Anthropometric Measurements: body mass index, weight  Biochemical Data, Medical Tests and Procedures: glucose/endocrine profile, electrolyte and renal panel, gastrointestinal profile, lipid profile  Nutrition-Focused Physical Findings: overall appearance     Nutrition Risk  Level of Risk/Frequency of Follow-up: low     Nutrition Follow-Up  RD Follow-up?: Karyn Jarquin, Student Dietitian 12/04/2023 9:58 AM     I certify that I directed the dietetic intern in service delivery and guided them using my skilled judgment. As the cosigning dietitian, I have reviewed the dietetic interns documentation and am responsible for the treatment, assessment, and plan.   JAKE Portillo 12/04/2023 1:48 PM

## 2023-12-04 NOTE — CARE UPDATE
12/03/23 2113   Patient Assessment/Suction   Level of Consciousness (AVPU) alert   Respiratory Effort Normal;Unlabored   Expansion/Accessory Muscles/Retractions no use of accessory muscles;no retractions   All Lung Fields Breath Sounds wheezes, expiratory   Rhythm/Pattern, Respiratory pattern regular;unlabored   Cough Frequency no cough   PRE-TX-O2   Device (Oxygen Therapy) room air   SpO2 99 %   Pulse Oximetry Type Intermittent   $ Pulse Oximetry - Single Charge Pulse Oximetry - Single   $ Pulse Oximetry - Multiple Charge Pulse Oximetry - Multiple   Pulse 66   Resp 18   Positioning HOB elevated 30 degrees   Positioning   Body Position position changed independently   Positioning/Transfer Devices pillows;in use   Aerosol Therapy   $ Aerosol Therapy Charges Aerosol Treatment   Daily Review of Necessity (SVN) completed   Respiratory Treatment Status (SVN) given   Treatment Route (SVN) mask;oxygen   Patient Position (SVN) HOB elevated   Post Treatment Assessment (SVN) wheezing decreased   Signs of Intolerance (SVN) none   Education   $ Education Bronchodilator;15 min   Respiratory Evaluation   $ Care Plan Tech Time 15 min   $ Eval/Re-eval Charges Re-evaluation

## 2023-12-04 NOTE — PLAN OF CARE
Discharge orders noted and met with pt who continues to deny current discharge needs. States that a family member will be able to pick him up at discharge.  TTC in place with PCP 12/7/23 0900.  Cleared for discharge from case management standpoint.       12/04/23 0955   Final Note   Assessment Type Final Discharge Note   Anticipated Discharge Disposition Home   Hospital Resources/Appts/Education Provided Appointments scheduled and added to AVS   Post-Acute Status   Post-Acute Authorization   (Home independently)   Discharge Delays None known at this time

## 2023-12-04 NOTE — DISCHARGE SUMMARY
[facility] Medicine  Discharge Summary      Patient Name: Jacoby Jean-Baptiste  MRN: [id number]  DAYSI: [id number]  Patient Class: IP- Inpatient  Admission Date: 11/28/2023  Hospital Length of Stay: 6 days  Discharge Date and Time: 12/4/2023 12:53 PM  Attending Physician: No att. providers found   Discharging Provider: Moon Armas MD  Primary Care Provider: Hunter Aguilar III, MD    Primary Care Team: Networked reference to record PCT     HPI:   48 year old male with a PMH Cdiff, UC on Remicade, DM2, anemia, tobacco use, HLD, and anxiety.     Patient recently discharged after hospital admission for shortness breath, chest pain abdominal pain associated with CT chest that showed patchy nodular infiltrates, one of which is cavitary in the left lower lobe, suspicion for septic emboli and/or necrosis .  Patient treated with antibiotics, prophylactic p.o. vancomycin.  BAL cultures negative. KOH and AFB cultures negative, KATJA negative for IE.  Anti GBM was negative.  Patient was treated with IV Levaquin and doxycycline. PO vancomycin given for C diff prophylaxis.     Patient states he has not really gotten any better since being treated for his condition.  Some tests were pending after discharge.  His aspergillus antigen is positive at 1.16    Procedure(s) (LRB):  BRONCHOSCOPY, WITH FLUOROSCOPY (N/A)      Hospital Course:   Assumed care of this patient on 11/30/23.  Patient with history of morbid obesity with BMI 46, ulcerative colitis, followed by Dr. Jean, previously on Remicade, diabetes mellitus, recent ex-smoker 3 weeks, anxiety, history of SVT status post prior ablation.  Recent [facility] admission 11/17 to 11/23 with concern for cavitating pneumonia versus septic emboli with necrosis, underwent bronchoscopy, KATJA, no vegetation or atrial thrombus, discharge to complete course of antibiotics.  Re-presented as no improvement on discharge, CT with progressive changes, multifocal nodular  consolidation with central cavity possible due to septic emboli, lymphadenopathy.  Seen by Pulmonary and Infectious Disease.  Started on piperacillin/tazobactam and voriconazole (aspergillus antigen 1.16).  EKG on 11/28 with read as atrial flutter with 4-1, but appears to be artifact, did recently have outpatient monitor, predominant rhythm sinus with brief runs of SVT, followed by cardiologist Dr. Roman, seen by cardiology here and recommends outpatient follow up.  On 11/30 underwent bronchoscopy with transbronchial biopsy submitted, procedure complicated by small apical pneumothorax, washings negative for malignancy but + inflammation, biopsy + necrotizing granulomatous inflammation, focal organizing chronic interstitial pneumonitis, no vasculitis, no organisms.  Infectious Disease recommends discharge on voriconazole 400 mg b.i.d., BuSpar to decreased to 2.5 mg daily, Zoloft discontinued while on voriconazole, EKG in 3-4 days to monitor QTC, separate from colesevelam.  He did developed mild blood-tinged phlegm post biopsy and he was monitored in hospital with improvement.  On 12/04 no acute issues, cleared by consultants for discharge and appears medically stable for discharge.  After communicated with consultants recommends for discharge on voriconazole 400 mg twice daily, he will have levels checked on morning of 12/8, no indication for prophylactic vancomycin on voriconazole.  He will follow-up with outpatient Infectious Disease and Pulmonary, Dr. Villarreal.  All new medications were sent to in-house pharmacy prior to discharge.  Discharge plan including medication, follow-up as well as return precautions were reviewed, he expressed understanding, no questions or concerns.  Discussed with consultants and nursing on day of discharge.    Discharge examination   Sitting in chair, alert, on RA     Goals of Care Treatment Preferences:  Code Status: Full Code      Consults:   Consults (From admission, onward)           Status Ordering Provider     Inpatient consult to Cardiology  Once        Provider:  Milvia Maldonado MD    Completed [facility]     Inpatient consult to Registered Dietitian/Nutritionist  Once        Provider:  (Not yet assigned)    Completed [facility]     Inpatient consult to Pulmonology  Once        Provider:  Kirk Petersen MD    Completed MAYDA LOPEZ     Inpatient consult to Infectious Diseases  Once        Provider:  Ophelia Kramer MD    Completed MAYDA LOPEZ            No new Assessment & Plan notes have been filed under this hospital service since the last note was generated.  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] 04/20/2023 Yes    Ulcerative colitis [K51.90] 04/20/2023 Yes     Chronic    Anemia [D64.9] 01/11/2023 Yes     Chronic    Anxiety [F41.9] 09/21/2022 Yes    BMI 45.0-49.9, adult [Z68.42] 06/23/2022 Not Applicable    Type 2 diabetes mellitus without complication, without long-term current use of insulin [E11.9] 01/29/2022 Yes    H/O cardiac radiofrequency ablation [Z98.890] 02/03/2021 Not Applicable    History of supraventricular tachycardia [Z86.79] 02/03/2021 Not Applicable      Problems Resolved During this Admission:    Diagnosis Date Noted Date Resolved POA    Pneumothorax after biopsy, apical [J95.811] 11/30/2023 12/04/2023 No    Leucocytosis [D72.829] 01/11/2023 12/01/2023 Yes    Septic embolism [I76]  12/01/2023 12/04/2023 Yes    Hypomagnesemia [E83.42] 11/17/2023 12/02/2023 No       Discharged Condition: good    Disposition: Home or Self Care    Follow Up:   Follow-up Information       Hunter Aguilar III, MD. Go on 12/7/2023.    Specialty: Family Medicine  Why: follow up  Contact information:  [address]  [address]  [place] [id number] [phone]               Lisa Villarreal MD. Schedule an appointment as soon as possible for a visit in 2 week(s).    Specialties: Pulmonary Disease, Sleep Medicine  Why: follow up  Contact information:  [address]  [address]  [place] [id number] [phone]               Ophelia Kramer MD. Schedule an appointment as soon as possible for a visit in 2 week(s).    Specialty: Infectious Diseases  Why: follow up  Contact information:  [address]  [address]  [place] [address]  [phone]                           Patient Instructions:      Voriconazole   Standing Status: Future Standing Exp. Date: 02/01/25   Order Comments: Please get lab test morning 12/08/2023, results to infectious disease Dr Chaves     Order Specific Question Answer Comments   Instructions: NON FASTING LAB [2194]      Diet Cardiac     Notify your health care provider if you experience any of the following:  temperature >100.4     Notify your health care provider if you experience any of the following:  difficulty breathing or increased cough     Notify your health care provider if you experience any of the following:  worsening rash     IN OFFICE EKG 12-LEAD (to [place])   Standing Status: Future Standing Exp. Date: 12/04/24   Order Comments: Monitor QTc     Activity as tolerated       Significant Diagnostic Studies: Labs: BMP:   Recent Labs   Lab 12/03/23  0553   GLU 83      K 4.1      CO2 25   BUN 17   CREATININE 1.1   CALCIUM 8.8   MG 1.6   , CMP   Recent Labs   Lab 12/03/23  0553      K 4.1      CO2 25   GLU 83   BUN 17   CREATININE 1.1   CALCIUM 8.8  "  ANIONGAP 5*   , CBC   Recent Labs   Lab 12/03/23  0553   WBC 11.00   HGB 10.6*   HCT 32.1*      , INR No results found for: "INR", "PROTIME", Lipid Panel   Lab Results   Component Value Date    CHOL 142 10/27/2023    HDL 44 10/27/2023    LDLCALC 81.4 10/27/2023    TRIG 83 10/27/2023    CHOLHDL 31.0 10/27/2023   , Troponin No results for input(s): "TROPONINI" in the last 168 hours., and A1C:   Recent Labs   Lab 10/27/23  0641   HGBA1C 5.2     X-Ray Chest AP Portable    Result Date: 12/3/2023  Reason: chest pain Chest pain, shortness of breath, nausea. FINDINGS: Portable chest at [facility] 11/30/2023 shows normal cardiomediastinal silhouette. Patchy scattered alveolar opacities throughout the left lung and, to a lesser extent, in right lung, have not significantly changed, most conspicuous in left midlung. No pleural effusion or pneumothorax. Central pulmonary vasculature is normal. No acute osseous abnormality. IMPRESSION: Unchanged left greater than right patchy pulmonary alveolar opacities, seen to better advantage on 11/28/2023 CT. Electronically signed by:  Froylan Cardozo MD  12/03/2023 10:47 AM CST Workstation: [id number]    X-Ray Chest 1 View    Result Date: 11/30/2023  Chest, single view. HISTORY: Pneumothorax. Comparison is made to an earlier examination. The previously described small left apical pneumothorax has decreased slightly in size in the interval. Patchy airspace opacities in the left lung remain unchanged. The right lung is appropriately expanded and clear. There is no significant effusion. The heart size and pulmonary vasculature are stable. IMPRESSION: Slight interval decrease in size of tiny left apical pneumothorax. Unchanged patchy left-sided airspace opacities. Electronically signed by:  Kelton Wells MD  11/30/2023 03:56 PM CST Workstation: [id number]    X-Ray Chest 1 View    Result Date: 11/30/2023  Reason: Post bronch FINDINGS: Portable chest with comparison chest x-ray November 28, 2023 " show normal cardiomediastinal silhouette. There is a small apical left pneumothorax. Interstitial opacities of the bilateral lungs noted with patchy airspace opacities of the left lung base. Pulmonary vasculature is normal. No acute osseous abnormality. IMPRESSION: 1.  Small apical left pneumothorax. Findings reported to Dr. Lisa Villarreal at 11:19 AM. 2.  Bilateral interstitial lung opacities opacities of the left lung base. Electronically signed by:  Charles Baker DO  11/30/2023 11:20 AM CST Workstation: [id number]    FL Flouro Usage    Result Date: 11/30/2023  See Notes This procedure was auto-finalized.    CT Chest Abdomen Pelvis With IV Contrast (XPD) Routine Oral Contrast    Result Date: 11/29/2023  EXAM DESCRIPTION: CT CHEST ABDOMEN PELVIS WITH IV CONTRAST (XPD) CLINICAL HISTORY: 48 years  Male  Sepsis TECHNIQUE: CT chest, abdomen, pelvis protocol with intravenous [contrast. No oral contrast was utilized.. All CT scans at this facility use dose modulation, iterative reconstruction, and/or weight based dosing when appropriate to reduce radiation dose to as low as reasonably achievable. COMPARISON: Portable view of the chest obtained earlier in the day for 50 8:00 PM. CT angiogram of the chest as well as CT scan of the abdomen and pelvis November 17, 2023 FINDINGS: Chest: Lungs: No pneumothorax or pleural effusion. There is been development of multiple areas of nodular consolidation throughout the lung parenchyma, some of which have central cavitation. Findings are suggestive of septic emboli. The largest area of consolidation is present in the lingula. Diffuse airway thickening is seen. The trachea is patent. Mediastinum: Heart size is within normal limits. No pericardial abnormality. Mediastinal lymphadenopathy is again seen and is more pronounced on today's exam. Lymph nodes measure up to 1.8 cm and are present in the pretracheal, prevascular, AP window and subcarinal region. Esophagus is unremarkable.  Mediastinal Vascular: Minimal calcified plaque in the aorta. No aneurysm. The right and left main pulmonary arteries are patent. Distal to this the vessels are not well seen. Bones: Soft tissues of the chest wall are unremarkable. No acute osseous abnormality. Abdomen: Liver and Biliary tree:Diffuse fatty infiltration of the liver. The gallbladder is surgically absent. Portal vein branches are patent. Liver is enlarged measuring 22 cm in length. Pancreas:Unremarkable Spleen:Spleen is enlarged measuring 14.1 cm. Kidneys:  Small nonobstructing kidney stones are seen bilaterally. There is symmetric renal enhancement. No perinephric abnormality. Adrenal glands:Within normal limits Vascular structures:No occlusion or stenosis. No retroperitoneal hematoma. Retroperitoneum:  There is areas of actual calcification within the omental fat predominantly in the central low abdomen are unchanged. This may represent areas of fat necrosis. Abdominal Wall:  Normal GI:  Bowel is of normal caliber.  No obstruction.  No focal bowel wall thickening.  Appendix: The appendix is not seen with confidence. General: No free air. No free fluid. Pelvis: Lymph nodes:  No pelvic mass or adenopathy. Organs:  Bladder appears normal Bones  unchanged. IMPRESSION: 1.  Interval development of multiple areas of nodular consolidation throughout the lung parenchyma, some of which have central cavitation. Findings are suggestive of septic emboli. Necrotizing pneumonia could also have this appearance 2.  Hepatosplenomegaly with fatty infiltration of the liver. 3.  Small nonobstructing kidney stones bilaterally. . Electronically signed by:  Blanca Sykes MD  11/29/2023 12:51 AM CST Workstation: [id number]    X-Ray Chest AP    Result Date: 11/28/2023  HISTORY: SOB COMPARISON:Comparison made with patient's previous imaging study of 11/23/2023. FINDINGS:Aggregate interstitial markings are established bases hilar/infrahilar region projecting into the left lower  lobe with marked improvement upon comparison. Resolution of interstitial markings elsewhere throughout the bilateral lungs. There is no evidence of pneumothorax. The mediastinal and hilar contours are well-maintained. The heart is normal in size. No significant pleural effusion. The osseous structures show nothing unusual. IMPRESSION:Improvement upon comparison with resolving interstitial and left lower lobe nodular alveolar opacities with nominal residual. Electronically signed by:  Adrian Hurtado MD  11/28/2023 05:46 PM CST Workstation: [id number]    X-Ray Chest AP Portable    Result Date: 11/23/2023  Reason: cavitating pneumonia FINDINGS: Portable chest 6 with comparison chest x-ray November 22, 2023 show normal cardiomediastinal silhouette. Patchy hazy lung opacities of the left mid to lower lung and mild bilateral interstitial lung opacities are unchanged. Pulmonary vasculature is normal. No acute osseous abnormality. IMPRESSION: No significant change from prior exam. Electronically signed by:  Charles Baker DO  11/23/2023 10:49 AM CST Workstation: [id number]    X-Ray Chest AP Portable    Result Date: 11/22/2023  CLINICAL HISTORY: 48 years (1975) Male cavitating pneumonia Table formatting from the original note was not included. TECHNIQUE: Portable AP radiograph the chest. One view. COMPARISON: Radiograph from November 21, 2023. FINDINGS: Mild diffuse interstitial opacity in both lungs with a slightly more focal nodular appearance in the left hilum and lower lung zone. Costophrenic angles are seen without effusion. No pneumothorax is identified. The heart is top normal in size. Osseous structures show degenerative changes in the spine. The visualized upper abdomen is unremarkable. IMPRESSION: Unchanged radiograph of the chest when accounting for differences in imaging technique. . Electronically signed by:  Adrian Lynch MD  11/22/2023 07:42 AM CST Workstation: [id number]    Transesophageal echo  (KATJA)    Result Date: 11/21/2023    Left Ventricle: There is normal systolic function with a visually estimated ejection fraction of 55 - 60%.   Right Ventricle: Normal right ventricular cavity size. Systolic function is normal.   Left Atrium: Agitated saline study of the atrial septum is negative, suggesting absence of intracardiac shunt at the atrial level. There appears to be lipomatous hypertrophy of the interatrial septum. Appendage velocity is normal at greater than 40 cm/sec. There is no thrombus in the left atrial appendage.   Aortic Valve: The aortic valve is a trileaflet valve.   Mitral Valve: There is mild regurgitation.   Tricuspid Valve: There is mild regurgitation.   No definitive evidence of infective endocarditis on this study.  Consider repeating KATJA in a week if clinical suspicion still persists.     X-Ray Chest AP Portable    Result Date: 11/21/2023  Portable chest x-ray at 10:07 AM is compared to prior study dated 11/18/2023 Clinical history is pneumonia Cardiomediastinal silhouette is normal in size. There is progression of the nodular alveolar opacities predominantly in the left lung compatible with multifocal pneumonia. There are no new infiltrates or pleural effusions. There are no acute osseous abnormalities. IMPRESSION: Progression of the nodular alveolar opacities in the left lung corresponding to patient's known cavitary pneumonia Electronically signed by:  Consuelo Shea MD  11/21/2023 10:19 AM CST Workstation: [id number]    FL Flouro Usage    Result Date: 11/20/2023  See Notes This procedure was auto-finalized.    CTA Head    Result Date: 11/19/2023  CMS MANDATED QUALITY DATA - CT RADIATION  436 All CT scans at this facility utilize dose modulation, iterative reconstruction, and/or weight based dosing when appropriate to reduce radiation dose to as low as reasonably achievable. Coronal and sagittal reformatted MIP images were created on an independent workstation, with images stored  in the patient's permanent electronic medical record. CT HEAD ANGIOGRAPHY WITH IV CONTRAST CLINICAL HISTORY: 48 years Male Headache, sudden, severe COMPARISON: Noncontrast CT head same day. Brain MRI May 11, 2023 FINDINGS: Visualized intracranial vertebral artery V4 segments demonstrates scattered atherosclerotic calcification and are patent. The basilar artery is patent. Bilateral posterior cerebral arteries are patent. Scattered atherosclerotic calcification of intracranial cavernous carotid arteries. Bilateral middle cerebral arteries and branch vessels are patent. Bilateral anterior cerebral arteries are patent. No intracranial aneurysm or vascular malformation is evident. Dural venous sinuses are patent. No pathologic intracranial contrast enhancement. Small volume right-sided mastoid fluid. Left mastoid air cells are clear. Paranasal sinuses are clear. IMPRESSION: Negative for intracranial aneurysm, vascular malformation, or arterial occlusion. Electronically signed by:  Rickie Brown MD  11/19/2023 12:43 PM CST Workstation: [id number]    X-Ray Chest AP Single View    Result Date: 11/19/2023  XR CHEST 1 VIEW CLINICAL HISTORY: 48 years Male arrhythmia COMPARISON: CT chest November 17, 2023 FINDINGS: Faint nodular densities are present in the left lower lobe, corresponding to reference chest CT findings. No confluent airspace disease. No interstitial edema. No large pleural effusion or pneumothorax. Heart size is within normal limits. No acute osseous abnormality. IMPRESSION: Nodular alveolar densities involving the left lower lobe, potentially infectious/inflammatory in nature, corresponding to recent chest CT findings. Electronically signed by:  Rickie Brown MD  11/19/2023 11:54 AM CST Workstation: [id number]    CT Head Without Contrast    Result Date: 11/19/2023  CMS MANDATED QUALITY DATA - CT RADIATION  436 All CT scans at this facility utilize dose modulation, iterative reconstruction, and/or weight  based dosing when appropriate to reduce radiation dose to as low as reasonably achievable. CT HEAD WITHOUT IV CONTRAST CLINICAL HISTORY: 48 years Male Headache, sudden, severe; eval for septic emboli COMPARISON: Brain MRI May 11, 2023 FINDINGS: Negative for acute intracranial hemorrhage, midline shift, or mass effect. Ventricles and sulci are normal in size. Gray-white differentiation is maintained. Cerebellar hemispheres and brainstem are unremarkable. Atherosclerotic calcification of intracranial carotid and vertebral arteries. No calvarial lesion or fracture. Mastoid air cells are clear on the left. Small-volume mastoid fluid on the right. Paranasal sinuses are clear. IMPRESSION: No CT evidence of acute intracranial pathology. Small right mastoid effusion. Electronically signed by:  Rickie Brown MD  11/19/2023 09:51 AM CST Workstation: [id number]    Echo    Result Date: 11/17/2023    Left Ventricle: The left ventricle is normal in size. Normal wall thickness. There is concentric remodeling. Normal wall motion. There is normal systolic function with a visually estimated ejection fraction of 55 - 60%. There is normal diastolic function.   Right Ventricle: Normal right ventricular cavity size. Wall thickness is normal. Right ventricle wall motion  is normal. Systolic function is normal.   IVC/SVC: Normal venous pressure at 3 mmHg.     CTA Chest Non-Coronary (PE Studies)    Result Date: 11/17/2023  CMS MANDATED QUALITY DATA - CT RADIATION  436 All CT scans at this facility utilize dose modulation, iterative reconstruction, and/or weight based dosing when appropriate to reduce radiation dose to as low as reasonably achievable. CLINICAL HISTORY: 48 years (1975) Male Pulmonary embolism (PE) suspected, high prob TECHNIQUE: CT CHEST ANGIOGRAPHY WITH IV CONTRAST, CT ABDOMEN PELVIS WITH IV CONTRAST.  Axial CT examination of the chest with attention to the pulmonary arteries was performed using contiguous axial images  from the diaphragms to the lung apices following the intravenous administration of non-ionic contrast material.  Images were reviewed using lung, mediastinal, and bone windows. The study was performed with thin sections with subsequent 3-D reformations, mid and reconstructions at multiple planes with images were stored in the PACS system. Subsequently axial CT of the abdomen and pelvis was obtained. COMPARISON: None available. FINDINGS: CTA PE evaluation: This is a low quality study for the evaluation of pulmonary embolism secondary to a combination of contrast bolus timing and motion artifact. The pulmonary arteries are normal in appearance without pulmonary emboli noted up to the central main arterial level, noting the limitations of CT technique for identifying small or isolated subsegmental emboli. CT Chest: Visualized neck: Within normal limits. Lungs: Very mild central hilar groundglass attenuation opacity is present. In addition there is ill-defined nodular densities as outlined below: -6 mm left apical pulmonary nodule (image 83) -6 mm and adjacent 7 mm pulmonary nodule in the left lower lobe (image 169) -11 mm soft tissue pulmonary nodule in the left lower lobe (image 205) -9 mm and adjacent 8mm cavitary pulmonary nodule in the adjacent left lower lobe (image 211) Airway: The trachea and central bronchial tree appear normal. Pleura: There is no pleural effusion. There is no pneumothorax. Cardiovascular: The heart is normal in size. There are no findings of right heart failure. The pulmonary artery is normal in size. There is no pericardial effusion. The thoracic aorta and great vessels are normal in course and caliber. Mediastinum: Small lymph nodes are present, for example: -15 x 10 mm AP window node (image 134) -21 x 12 mm right precarinal node (image 1:15) -17 x 10 mm subcarinal node (image 136) Soft tissues: The peripheral soft tissues appear normal. Musculoskeletal: The visualized osseous structures  appear normal.  There are no suspicious osseous lesions. Esophagus: The esophagus appears grossly normal. CT Abdomen: Liver: Relative diffuse low-attenuation liver consistent with hepatic steatosis. The liver is enlarged measuring 19.7 cm (sagittal right lobe). Gallbladder: The gallbladder is surgically absent. Biliary Tree: No intra or extrahepatic ductal dilation. Spleen: Within normal limits. Pancreas: There is a moderate-sized periampullary duodenal diverticulum. No ductal dilation, differential enhancement or mass is identified. Adrenal Glands: The adrenal glands appear within normal limits. Kidneys: No hydronephrosis/hydroureter or evidence of obstructive uropathy. There is a 4 mm nonobstructing stone in the inferior pole the left kidney and 2 mm nonobstructing stone in the inferior pole the right kidney (image 80 and 83 respectively). There mild faint striations in the renal nephrograms. Vasculature: The aorta is normal in course and caliber. Lymph nodes: No abdominal lymphadenopathy is seen. Intraperitoneal structures: There is no ascites. Bowel: Scattered sigmoid diverticula are present without focal diverticulitis. There is a rim calcified structure adjacent to the cecum (image 150) containing fat suggesting sequelae of old infection/trauma or a remote torsed epiploic appendage. The bowel is normal in course and caliber with no finding of obstruction, intra-abdominal free air or abscess. The appendix is normal (image 141) Abdominal wall: The abdominal wall and musculature are normal. Musculoskeletal: No acute osseous abnormality is identified. CT Pelvis: Bladder: The urinary bladder is within normal limits. Reproductive Organs: The prostate and seminal vesicles are within normal limits. Pelvic Lymph nodes: No pelvic lymphadenopathy or pelvic mass is identified. IMPRESSION: 1. No evidence of pulmonary embolism to the central main arterial level. If there is continued clinical concern, consider further  evaluation with lower extremity doppler or repeat imaging. 2. Scattered ill-defined nodular densities in the lungs, most noticeably in the left lower lobe, some of which show central cavitation suspicious for septic emboli and/or necrosis. Consider correlation with the symptoms, history and possible echocardiogram. 3. Faint striations in the renal nephrograms, possibly secondary to phase of contrast or mild pyelonephritis, consider correlation with urinalysis. 4. Small bilateral nonobstructing renal stones. 5. Hepatomegaly background parenchyma findings a steatosis. 6. Prior cholecystectomy with no intra-/extrahepatic biliary ductal dilation. . Electronically signed by:  Adrian Lynch MD  11/17/2023 12:27 PM CST Workstation: [id number]    CT Abdomen Pelvis With IV Contrast    Result Date: 11/17/2023  CMS MANDATED QUALITY DATA - CT RADIATION  436 All CT scans at this facility utilize dose modulation, iterative reconstruction, and/or weight based dosing when appropriate to reduce radiation dose to as low as reasonably achievable. CLINICAL HISTORY: 48 years (1975) Male Pulmonary embolism (PE) suspected, high prob TECHNIQUE: CT CHEST ANGIOGRAPHY WITH IV CONTRAST, CT ABDOMEN PELVIS WITH IV CONTRAST.  Axial CT examination of the chest with attention to the pulmonary arteries was performed using contiguous axial images from the diaphragms to the lung apices following the intravenous administration of non-ionic contrast material.  Images were reviewed using lung, mediastinal, and bone windows. The study was performed with thin sections with subsequent 3-D reformations, mid and reconstructions at multiple planes with images were stored in the PACS system. Subsequently axial CT of the abdomen and pelvis was obtained. COMPARISON: None available. FINDINGS: CTA PE evaluation: This is a low quality study for the evaluation of pulmonary embolism secondary to a combination of contrast bolus timing and motion artifact. The  pulmonary arteries are normal in appearance without pulmonary emboli noted up to the central main arterial level, noting the limitations of CT technique for identifying small or isolated subsegmental emboli. CT Chest: Visualized neck: Within normal limits. Lungs: Very mild central hilar groundglass attenuation opacity is present. In addition there is ill-defined nodular densities as outlined below: -6 mm left apical pulmonary nodule (image 83) -6 mm and adjacent 7 mm pulmonary nodule in the left lower lobe (image 169) -11 mm soft tissue pulmonary nodule in the left lower lobe (image 205) -9 mm and adjacent 8mm cavitary pulmonary nodule in the adjacent left lower lobe (image 211) Airway: The trachea and central bronchial tree appear normal. Pleura: There is no pleural effusion. There is no pneumothorax. Cardiovascular: The heart is normal in size. There are no findings of right heart failure. The pulmonary artery is normal in size. There is no pericardial effusion. The thoracic aorta and great vessels are normal in course and caliber. Mediastinum: Small lymph nodes are present, for example: -15 x 10 mm AP window node (image 134) -21 x 12 mm right precarinal node (image 1:15) -17 x 10 mm subcarinal node (image 136) Soft tissues: The peripheral soft tissues appear normal. Musculoskeletal: The visualized osseous structures appear normal.  There are no suspicious osseous lesions. Esophagus: The esophagus appears grossly normal. CT Abdomen: Liver: Relative diffuse low-attenuation liver consistent with hepatic steatosis. The liver is enlarged measuring 19.7 cm (sagittal right lobe). Gallbladder: The gallbladder is surgically absent. Biliary Tree: No intra or extrahepatic ductal dilation. Spleen: Within normal limits. Pancreas: There is a moderate-sized periampullary duodenal diverticulum. No ductal dilation, differential enhancement or mass is identified. Adrenal Glands: The adrenal glands appear within normal limits.  Kidneys: No hydronephrosis/hydroureter or evidence of obstructive uropathy. There is a 4 mm nonobstructing stone in the inferior pole the left kidney and 2 mm nonobstructing stone in the inferior pole the right kidney (image 80 and 83 respectively). There mild faint striations in the renal nephrograms. Vasculature: The aorta is normal in course and caliber. Lymph nodes: No abdominal lymphadenopathy is seen. Intraperitoneal structures: There is no ascites. Bowel: Scattered sigmoid diverticula are present without focal diverticulitis. There is a rim calcified structure adjacent to the cecum (image 150) containing fat suggesting sequelae of old infection/trauma or a remote torsed epiploic appendage. The bowel is normal in course and caliber with no finding of obstruction, intra-abdominal free air or abscess. The appendix is normal (image 141) Abdominal wall: The abdominal wall and musculature are normal. Musculoskeletal: No acute osseous abnormality is identified. CT Pelvis: Bladder: The urinary bladder is within normal limits. Reproductive Organs: The prostate and seminal vesicles are within normal limits. Pelvic Lymph nodes: No pelvic lymphadenopathy or pelvic mass is identified. IMPRESSION: 1. No evidence of pulmonary embolism to the central main arterial level. If there is continued clinical concern, consider further evaluation with lower extremity doppler or repeat imaging. 2. Scattered ill-defined nodular densities in the lungs, most noticeably in the left lower lobe, some of which show central cavitation suspicious for septic emboli and/or necrosis. Consider correlation with the symptoms, history and possible echocardiogram. 3. Faint striations in the renal nephrograms, possibly secondary to phase of contrast or mild pyelonephritis, consider correlation with urinalysis. 4. Small bilateral nonobstructing renal stones. 5. Hepatomegaly background parenchyma findings a steatosis. 6. Prior cholecystectomy with no  intra-/extrahepatic biliary ductal dilation. . Electronically signed by:  Adrian Lynch MD  11/17/2023 12:27 PM CST Workstation: [id number]    X-Ray Chest AP Portable    Result Date: 11/17/2023  XR CHEST 1 VIEW CLINICAL HISTORY: 48 years Male Chest Pain COMPARISON: April 20, 2023 FINDINGS: Cardiac silhouette size is stable compared to prior. No pulmonary edema, confluent airspace disease, pleural effusion, or pneumothorax. No acute osseous abnormality. IMPRESSION: No acute pulmonary pathology. Electronically signed by:  Rickie Brown MD  11/17/2023 08:47 AM CST Workstation: [id number]     Pending Diagnostic Studies:       Procedure Component Value Units Date/Time    Cytology Specimen-Medical Cytology (Fluid/Wash/Brush) [[id number]] Collected: 11/30/23 1152    Order Status: Sent Lab Status: No result     Specimen: Bronchial Brush            Medications:  Reconciled Home Medications:      Medication List        START taking these medications      colesevelam 625 mg tablet  Commonly known as: WELCHOL  Take 3 tablets (1,875 mg total) by mouth 2 (two) times daily with meals. Please separate from voriconazole     hydrOXYzine HCL 25 MG tablet  Commonly known as: ATARAX  Take 1 tablet (25 mg total) by mouth 3 (three) times daily as needed for Itching.     voriconazole 50 MG Tab  Commonly known as: VFEND  Take 8 tablets (400 mg total) by mouth 2 (two) times daily.            CHANGE how you take these medications      busPIRone 5 MG Tab  Commonly known as: BUSPAR  TAKE 1/2 TABLET (2.5 mg) daily while on voriconazole  What changed:   medication strength  how much to take  how to take this  when to take this  additional instructions     HYDROcodone-acetaminophen 5-325 mg per tablet  Commonly known as: NORCO  Take 1 tablet by mouth every 8 (eight) hours as needed for Pain.  What changed: when to take this     varenicline 0.5 mg (11)- 1 mg (42) tablet  Commonly known as: CHANTIX STARTING MONTH BOX  Take one 0.5mg tab by mouth  once daily X3 days,then increase to one 0.5mg tab twice daily X4 days,then increase to one 1mg tab twice daily  What changed:   how much to take  how to take this  when to take this            CONTINUE taking these medications      dicyclomine 10 MG capsule  Commonly known as: BENTYL  Take 10 mg by mouth 3 (three) times daily as needed.     diphenhydrAMINE 25 mg capsule  Commonly known as: BENADRYL  Take 1 capsule (25 mg total) by mouth every 6 (six) hours as needed for Itching.     metFORMIN 500 MG ER 24hr tablet  Commonly known as: GLUCOPHAGE-XR  Take 1 tablet (500 mg total) by mouth 2 (two) times daily with meals.     metoprolol succinate 50 MG 24 hr tablet  Commonly known as: TOPROL-XL  Take 1 tablet (50 mg total) by mouth once daily.     OZEMPIC 1 mg/dose (4 mg/3 mL)  Generic drug: semaglutide  Inject 1 mg into the skin every 7 days.     pantoprazole 40 MG tablet  Commonly known as: PROTONIX  Take 1 tablet (40 mg total) by mouth 2 (two) times daily.     promethazine 12.5 MG Tab  Commonly known as: PHENERGAN  Take 12.5 mg by mouth 4 (four) times daily as needed.     simvastatin 20 MG tablet  Commonly known as: ZOCOR  Take 1 tablet (20 mg total) by mouth every evening.     zolpidem 10 mg Tab  Commonly known as: AMBIEN  Take 1 tablet (10 mg total) by mouth nightly as needed (insomnia).            STOP taking these medications      doxycycline 100 MG tablet  Commonly known as: VIBRA-TABS     levoFLOXacin 750 MG tablet  Commonly known as: LEVAQUIN     sertraline 100 MG tablet  Commonly known as: ZOLOFT     vancomycin 125 mg/5 mL Soln              Indwelling Lines/Drains at time of discharge:   Lines/Drains/Airways       None                   Time spent on the discharge of patient: 35 minutes         Moon Armas MD  Department of Hospital Medicine  [facility]

## 2023-12-05 ENCOUNTER — PATIENT OUTREACH (OUTPATIENT)
Dept: ADMINISTRATIVE | Facility: CLINIC | Age: 48
End: 2023-12-05
Payer: COMMERCIAL

## 2023-12-05 NOTE — PROGRESS NOTES
C3 nurse spoke with Jacoby Jean-Baptiste  for a [facility] post hospital discharge follow up call. The patient has a scheduled HOSFU appointment with Joanna De La Torre on 12/7/23 @ 0900.

## 2023-12-06 LAB — GALACTOMANNAN AG SPEC IA-ACNC: 0.69 INDEX (ref 0–0.49)

## 2023-12-07 ENCOUNTER — PATIENT MESSAGE (OUTPATIENT)
Dept: FAMILY MEDICINE | Facility: CLINIC | Age: 48
End: 2023-12-07

## 2023-12-07 ENCOUNTER — OFFICE VISIT (OUTPATIENT)
Dept: FAMILY MEDICINE | Facility: CLINIC | Age: 48
End: 2023-12-07
Payer: COMMERCIAL

## 2023-12-07 VITALS
SYSTOLIC BLOOD PRESSURE: 134 MMHG | OXYGEN SATURATION: 97 % | HEART RATE: 96 BPM | WEIGHT: 285.38 LBS | BODY MASS INDEX: 47.55 KG/M2 | DIASTOLIC BLOOD PRESSURE: 76 MMHG | RESPIRATION RATE: 18 BRPM | TEMPERATURE: 98 F | HEIGHT: 65 IN

## 2023-12-07 DIAGNOSIS — Z86.79 HISTORY OF SUPRAVENTRICULAR TACHYCARDIA: ICD-10-CM

## 2023-12-07 DIAGNOSIS — F41.9 ANXIETY: ICD-10-CM

## 2023-12-07 DIAGNOSIS — J18.9 PNEUMONIA OF BOTH LUNGS DUE TO INFECTIOUS ORGANISM, UNSPECIFIED PART OF LUNG: Primary | ICD-10-CM

## 2023-12-07 DIAGNOSIS — E11.9 TYPE 2 DIABETES MELLITUS WITHOUT COMPLICATION, WITHOUT LONG-TERM CURRENT USE OF INSULIN: ICD-10-CM

## 2023-12-07 DIAGNOSIS — Z86.19 HISTORY OF CLOSTRIDIOIDES DIFFICILE COLITIS: Chronic | ICD-10-CM

## 2023-12-07 DIAGNOSIS — B44.9 ASPERGILLOSIS: ICD-10-CM

## 2023-12-07 DIAGNOSIS — Z87.891 EX-SMOKER: ICD-10-CM

## 2023-12-07 PROCEDURE — 3078F DIAST BP <80 MM HG: CPT | Mod: CPTII,S$GLB,,

## 2023-12-07 PROCEDURE — 93010 ELECTROCARDIOGRAM REPORT: CPT | Mod: S$GLB,,, | Performed by: INTERNAL MEDICINE

## 2023-12-07 PROCEDURE — 1159F PR MEDICATION LIST DOCUMENTED IN MEDICAL RECORD: ICD-10-PCS | Mod: CPTII,S$GLB,,

## 2023-12-07 PROCEDURE — 1159F MED LIST DOCD IN RCRD: CPT | Mod: CPTII,S$GLB,,

## 2023-12-07 PROCEDURE — 3075F PR MOST RECENT SYSTOLIC BLOOD PRESS GE 130-139MM HG: ICD-10-PCS | Mod: CPTII,S$GLB,,

## 2023-12-07 PROCEDURE — 3044F HG A1C LEVEL LT 7.0%: CPT | Mod: CPTII,S$GLB,,

## 2023-12-07 PROCEDURE — 3066F PR DOCUMENTATION OF TREATMENT FOR NEPHROPATHY: ICD-10-PCS | Mod: CPTII,S$GLB,,

## 2023-12-07 PROCEDURE — 93010 EKG 12-LEAD: ICD-10-PCS | Mod: S$GLB,,, | Performed by: INTERNAL MEDICINE

## 2023-12-07 PROCEDURE — 3075F SYST BP GE 130 - 139MM HG: CPT | Mod: CPTII,S$GLB,,

## 2023-12-07 PROCEDURE — 93005 ELECTROCARDIOGRAM TRACING: CPT | Mod: S$GLB,,,

## 2023-12-07 PROCEDURE — 3061F NEG MICROALBUMINURIA REV: CPT | Mod: CPTII,S$GLB,,

## 2023-12-07 PROCEDURE — 1111F PR DISCHARGE MEDS RECONCILED W/ CURRENT OUTPATIENT MED LIST: ICD-10-PCS | Mod: CPTII,S$GLB,,

## 2023-12-07 PROCEDURE — 99214 OFFICE O/P EST MOD 30 MIN: CPT | Mod: S$GLB,,,

## 2023-12-07 PROCEDURE — 3044F PR MOST RECENT HEMOGLOBIN A1C LEVEL <7.0%: ICD-10-PCS | Mod: CPTII,S$GLB,,

## 2023-12-07 PROCEDURE — 3066F NEPHROPATHY DOC TX: CPT | Mod: CPTII,S$GLB,,

## 2023-12-07 PROCEDURE — 99214 PR OFFICE/OUTPT VISIT, EST, LEVL IV, 30-39 MIN: ICD-10-PCS | Mod: S$GLB,,,

## 2023-12-07 PROCEDURE — 1160F PR REVIEW ALL MEDS BY PRESCRIBER/CLIN PHARMACIST DOCUMENTED: ICD-10-PCS | Mod: CPTII,S$GLB,,

## 2023-12-07 PROCEDURE — 3078F PR MOST RECENT DIASTOLIC BLOOD PRESSURE < 80 MM HG: ICD-10-PCS | Mod: CPTII,S$GLB,,

## 2023-12-07 PROCEDURE — 93005 EKG 12-LEAD: ICD-10-PCS | Mod: S$GLB,,,

## 2023-12-07 PROCEDURE — 3061F PR NEG MICROALBUMINURIA RESULT DOCUMENTED/REVIEW: ICD-10-PCS | Mod: CPTII,S$GLB,,

## 2023-12-07 PROCEDURE — 1160F RVW MEDS BY RX/DR IN RCRD: CPT | Mod: CPTII,S$GLB,,

## 2023-12-07 PROCEDURE — 1111F DSCHRG MED/CURRENT MED MERGE: CPT | Mod: CPTII,S$GLB,,

## 2023-12-07 NOTE — PROGRESS NOTES
Subjective:       Patient ID: Jacoby Jean-Baptiste is a 48 y.o. male.    Chief Complaint: Follow-up (hospital)    Devin Jean-Baptiste is a 48 y.o. male patient who presents to clinic for hospital follow up.  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  He is followed by Dr. Patton with Infectious disease and has a follow up on 12/14.  He is also being followed by Dr. Villarreal with Pulmonology and has follow-up on 12/27.  Patient has a history of anxiety and was being treated with Zoloft and BuSpar.  His Zoloft was discontinued and his BuSpar dose was decreased from 7.5 mg 3 times a day to 2.5 mg once a day due to potential medication interaction with the voriconazole.  He expresses concern that he may develop increased anxiety due to this medication change.  Today he is feeling okay.  He quit smoking about a month ago and is doing well with this.  He has an appointment with smoking cessation clinic coming up.  He is feeling better.  Shortness a breath has improved.  He was able to return back to work yesterday.  Needs repeat EKG today due to potential cardiac side effects of voriconazole.  Recent labs reviewed magnesium was normal at 1.6.  BNP looked good.  H&H 10.6/32.1.      HPI    11/23  Hospital Course  Patient was admitted with chest pain and CT chest showed  patchy nodular infiltrates, one of which is cavitary in the left lower lobe.  The patient is immunosuppressed from his Remicade injections for his ulcerative colitis.  He was started on empiric antibiotic therapy.  He was evaluated by Infectious Disease.  Cultures have been negative so far.  He had bronchoscopy by Pulmonary on 11/20.  Will have KATJA by Cardiology on 11/21. BAL cultures negative. KOH and AFB cultures negative. Patient was treated with IV Levaquin and doxycycline. PO vancomycin given for C diff prophylaxis. Patient developed a generalized macular papular rash  mainly in the abdomen. Patient will need to follow with rheumatology. Cleared for discharge by pulmonary and ID. He does have leukocytosis but procalcition improved. DC antibiotics Levaquin and Doxy until 11/30 with PO Vancomycin as per ID recommendations.      12/4  Hospital Course:   Assumed care of this patient on 11/30/23.  Patient with history of morbid obesity with BMI 46, ulcerative colitis, followed by Dr. Jean, previously on Remicade, diabetes mellitus, recent ex-smoker 3 weeks, anxiety, history of SVT status post prior ablation.  Recent [facility] admission 11/17 to 11/23 with concern for cavitating pneumonia versus septic emboli with necrosis, underwent bronchoscopy, KATJA, no vegetation or atrial thrombus, discharge to complete course of antibiotics.  Re-presented as no improvement on discharge, CT with progressive changes, multifocal nodular consolidation with central cavity possible due to septic emboli, lymphadenopathy.  Seen by Pulmonary and Infectious Disease.  Started on piperacillin/tazobactam and voriconazole (aspergillus antigen 1.16).  EKG on 11/28 with read as atrial flutter with 4-1, but appears to be artifact, did recently have outpatient monitor, predominant rhythm sinus with brief runs of SVT, followed by cardiologist Dr. Roman, seen by cardiology here and recommends outpatient follow up.  On 11/30 underwent bronchoscopy with transbronchial biopsy submitted, procedure complicated by small apical pneumothorax, washings negative for malignancy but + inflammation, biopsy + necrotizing granulomatous inflammation, focal organizing chronic interstitial pneumonitis, no vasculitis, no organisms.  Infectious Disease recommends discharge on voriconazole 400 mg b.i.d., BuSpar to decreased to 2.5 mg daily, Zoloft discontinued while on voriconazole, EKG in 3-4 days to monitor QTC, separate from colesevelam.  He did developed mild blood-tinged phlegm post biopsy and he was monitored in hospital with  improvement.  On 12/04 no acute issues, cleared by consultants for discharge and appears medically stable for discharge.  After communicated with consultants recommends for discharge on voriconazole 400 mg twice daily, he will have levels checked on morning of 12/8, no indication for prophylactic vancomycin on voriconazole.  He will follow-up with outpatient Infectious Disease and Pulmonary, Dr. Villarreal.  All new medications were sent to in-house pharmacy prior to discharge.  Discharge plan including medication, follow-up as well as return precautions were reviewed, he expressed understanding, no questions or concerns.  Discussed with consultants and nursing on day of discharge.          Review of patient's allergies indicates:  No Known Allergies  Social Determinants of Health     Tobacco Use: Medium Risk (12/7/2023)    Patient History     Smoking Tobacco Use: Former     Smokeless Tobacco Use: Never     Passive Exposure: Not on file   Alcohol Use: Not At Risk (4/21/2023)    AUDIT-C     Frequency of Alcohol Consumption: Never     Average Number of Drinks: Patient does not drink     Frequency of Binge Drinking: Never   Financial Resource Strain: Low Risk  (4/21/2023)    Overall Financial Resource Strain (CARDIA)     Difficulty of Paying Living Expenses: Not very hard   Food Insecurity: No Food Insecurity (4/21/2023)    Hunger Vital Sign     Worried About Running Out of Food in the Last Year: Never true     Ran Out of Food in the Last Year: Never true   Transportation Needs: No Transportation Needs (4/21/2023)    PRAPARE - Transportation     Lack of Transportation (Medical): No     Lack of Transportation (Non-Medical): No   Physical Activity: Inactive (4/21/2023)    Exercise Vital Sign     Days of Exercise per Week: 0 days     Minutes of Exercise per Session: 0 min   Stress: No Stress Concern Present (4/21/2023)    [employer] - Occupational Stress Questionnaire     Feeling of Stress : Only  a little   Social Connections: Socially Isolated (4/21/2023)    Social Connection and Isolation Panel [NHANES]     Frequency of Communication with Friends and Family: Twice a week     Frequency of Social Gatherings with Friends and Family: Twice a week     Attends Taoist Services: Never     Active Member of Clubs or Organizations: No     Attends Club or Organization Meetings: Never     Marital Status: Never    Housing Stability: Low Risk  (4/21/2023)    Housing Stability Vital Sign     Unable to Pay for Housing in the Last Year: No     Number of Places Lived in the Last Year: 1     Unstable Housing in the Last Year: No   Depression: Low Risk  (12/7/2023)    Depression     Last PHQ-4: Flowsheet Data: 0      Past Medical History:   Diagnosis Date    Diabetes mellitus 01/2022    Hyperlipidemia 2015    Hypertension 2015    Insomnia 2015      Past Surgical History:   Procedure Laterality Date    BRONCHOSCOPY WITH FLUOROSCOPY Left 11/20/2023    Procedure: BRONCHOSCOPY, WITH FLUOROSCOPY;  Surgeon: Lisa Villarreal MD;  Location: [facility];  Service: Pulmonary;  Laterality: Left;    BRONCHOSCOPY WITH FLUOROSCOPY N/A 11/30/2023    Procedure: BRONCHOSCOPY, WITH FLUOROSCOPY;  Surgeon: Lisa Villarreal MD;  Location: [facility];  Service: Pulmonary;  Laterality: N/A;    CARDIAC ELECTROPHYSIOLOGY MAPPING AND ABLATION  2017    SVT    CHOLECYSTECTOMY  2011    COLONOSCOPY N/A 5/3/2022    Procedure: COLONOSCOPY;  Surgeon: Shan Jean III, MD;  Location: [facility];  Service: Endoscopy;  Laterality: N/A;    COLONOSCOPY WITH FECAL MICROBIOTA TRANSFER N/A 3/21/2023    Procedure: COLONOSCOPY, WITH FECAL MICROBIOTA TRANSFER;  Surgeon: David Millan MD;  Location: [facility];  Service: Endoscopy;  Laterality: N/A;    ESOPHAGOGASTRODUODENOSCOPY N/A 4/24/2023    Procedure: EGD (ESOPHAGOGASTRODUODENOSCOPY);  Surgeon: Shan Jean III, MD;  Location: [facility];  Service: Endoscopy;  Laterality: N/A;    GANGLION  CYST EXCISION Left 1992    UMBILICAL HERNIA REPAIR  2011    with mesh      Social History     Socioeconomic History    Marital status: Single         Current Outpatient Medications:     colesevelam (WELCHOL) 625 mg tablet, Take 3 tablets (1,875 mg total) by mouth 2 (two) times daily with meals. Please separate from voriconazole, Disp: 180 tablet, Rfl: 0    dicyclomine (BENTYL) 10 MG capsule, Take 10 mg by mouth 3 (three) times daily as needed., Disp: , Rfl:     diphenhydrAMINE (BENADRYL) 25 mg capsule, Take 1 capsule (25 mg total) by mouth every 6 (six) hours as needed for Itching., Disp: 30 capsule, Rfl: 0    HYDROcodone-acetaminophen (NORCO) 5-325 mg per tablet, Take 1 tablet by mouth every 8 (eight) hours as needed for Pain., Disp: 12 tablet, Rfl: 0    hydrOXYzine HCL (ATARAX) 25 MG tablet, Take 1 tablet (25 mg total) by mouth 3 (three) times daily as needed for Itching., Disp: 15 tablet, Rfl: 0    metFORMIN (GLUCOPHAGE-XR) 500 MG ER 24hr tablet, Take 1 tablet (500 mg total) by mouth 2 (two) times daily with meals., Disp: 180 tablet, Rfl: 1    metoprolol succinate (TOPROL-XL) 50 MG 24 hr tablet, Take 1 tablet (50 mg total) by mouth once daily., Disp: 90 tablet, Rfl: 1    pantoprazole (PROTONIX) 40 MG tablet, Take 1 tablet (40 mg total) by mouth 2 (two) times daily., Disp: 180 tablet, Rfl: 3    semaglutide (OZEMPIC) 1 mg/dose (4 mg/3 mL), Inject 1 mg into the skin every 7 days., Disp: 3 each, Rfl: 1    simvastatin (ZOCOR) 20 MG tablet, Take 1 tablet (20 mg total) by mouth every evening., Disp: 90 tablet, Rfl: 1    varenicline (CHANTIX STARTING MONTH BOX) 0.5 mg (11)- 1 mg (42) tablet, Take one 0.5mg tab by mouth once daily X3 days,then increase to one 0.5mg tab twice daily X4 days,then increase to one 1mg tab twice daily (Patient taking differently: Take 1 tablet by mouth 2 (two) times daily. Take one 0.5mg tab by mouth once daily X3 days,then increase to one 0.5mg tab twice daily X4 days,then increase to one 1mg  tab twice daily), Disp: 1 each, Rfl: 0    voriconazole (VFEND) 50 MG Tab, Take 8 tablets (400 mg total) by mouth 2 (two) times daily., Disp: 480 tablet, Rfl: 0    zolpidem (AMBIEN) 10 mg Tab, Take 1 tablet (10 mg total) by mouth nightly as needed (insomnia)., Disp: 30 tablet, Rfl: 2    busPIRone (BUSPAR) 5 MG Tab, TAKE 1/2 TABLET (2.5 mg) daily while on voriconazole (Patient not taking: Reported on 12/7/2023), Disp: 30 tablet, Rfl: 0    promethazine (PHENERGAN) 12.5 MG Tab, Take 12.5 mg by mouth 4 (four) times daily as needed., Disp: , Rfl:   No current facility-administered medications for this visit.    Facility-Administered Medications Ordered in Other Visits:     lactated ringers infusion, , Intravenous, Continuous, David Millan MD, Last Rate: 75 mL/hr at 03/21/23 1252, New Bag at 03/21/23 1252    Lab Results   Component Value Date    WBC 11.00 12/03/2023    HGB 10.6 (L) 12/03/2023    HCT 32.1 (L) 12/03/2023     12/03/2023    CHOL 142 10/27/2023    TRIG 83 10/27/2023    HDL 44 10/27/2023    ALT 14 12/01/2023    AST 16 12/01/2023     12/03/2023    K 4.1 12/03/2023     12/03/2023    CREATININE 1.1 12/03/2023    BUN 17 12/03/2023    CO2 25 12/03/2023    TSH 0.550 05/24/2023    PSA 0.24 03/10/2021    HGBA1C 5.2 10/27/2023    MICROALBUR 1.2 01/26/2022       Review of Systems   Constitutional:  Positive for fatigue. Negative for chills and fever.   Respiratory:  Positive for shortness of breath. Negative for chest tightness and wheezing.         Still has some mild shortness a breath but it is improving.   Cardiovascular:  Negative for chest pain and palpitations.   Gastrointestinal:  Negative for abdominal pain and diarrhea.       Objective:      Physical Exam  Vitals reviewed.   Constitutional:       Appearance: Normal appearance. He is obese.   Cardiovascular:      Rate and Rhythm: Normal rate and regular rhythm.      Pulses: Normal pulses.      Heart sounds: Normal heart sounds. No murmur  heard.  Pulmonary:      Effort: Pulmonary effort is normal.      Breath sounds: Normal breath sounds.   Abdominal:      General: Bowel sounds are normal.      Tenderness: There is no abdominal tenderness.   Skin:     General: Skin is warm and dry.      Capillary Refill: Capillary refill takes less than 2 seconds.   Neurological:      Mental Status: He is alert.   Psychiatric:         Attention and Perception: Attention normal.         Mood and Affect: Mood normal. Affect is flat.         Behavior: Behavior normal. Behavior is cooperative.         Thought Content: Thought content normal. Thought content does not include homicidal or suicidal ideation.         Assessment:       1. Aspergillosis    2. History of supraventricular tachycardia    3. Anxiety    4. Type 2 diabetes mellitus without complication, without long-term current use of insulin    5. Ex-smoker    6. History of Clostridioides difficile colitis    7. Pneumonia of both lungs due to infectious organism, unspecified part of lung        Plan:       Jacoby was seen today for follow-up.    Diagnoses and all orders for this visit:    Aspergillosis    History of supraventricular tachycardia  -     IN OFFICE EKG 12-LEAD (to Muse)    Anxiety    Type 2 diabetes mellitus without complication, without long-term current use of insulin    Ex-smoker    History of Clostridioides difficile colitis    Pneumonia of both lungs due to infectious organism, unspecified part of lung    EKG in clinic today normal sinus rhythm with AV rate of 88, QT/ MS /462 MS  Follow-up with pulmonology and infectious disease as scheduled.  Patient will schedule follow-up with cardiology.  Have voriconazole level checked as scheduled.  Discontinue simvastatin due to potential interaction with voriconazole.  Continue BuSpar 2.5 mg daily for anxiety.  Do not restart Zoloft at this time.  Continue smoking cessation and follow-up with smoking cessation clinic.  Follow-up in clinic in  January as scheduled or sooner if needed.

## 2023-12-08 ENCOUNTER — LAB VISIT (OUTPATIENT)
Dept: LAB | Facility: HOSPITAL | Age: 48
End: 2023-12-08
Attending: INTERNAL MEDICINE
Payer: COMMERCIAL

## 2023-12-08 ENCOUNTER — TELEPHONE (OUTPATIENT)
Dept: FAMILY MEDICINE | Facility: CLINIC | Age: 48
End: 2023-12-08
Payer: COMMERCIAL

## 2023-12-08 DIAGNOSIS — Z51.81 THERAPEUTIC DRUG MONITORING: ICD-10-CM

## 2023-12-08 PROCEDURE — 80285 DRUG ASSAY VORICONAZOLE: CPT | Performed by: INTERNAL MEDICINE

## 2023-12-08 PROCEDURE — 36415 COLL VENOUS BLD VENIPUNCTURE: CPT | Performed by: INTERNAL MEDICINE

## 2023-12-08 PROCEDURE — 30000890 LABCORP MISCELLANEOUS TEST: Performed by: INTERNAL MEDICINE

## 2023-12-11 LAB
LABCORP MISC TEST CODE: NORMAL
LABCORP MISC TEST CODE: NORMAL
LABCORP MISC TEST NAME: NORMAL
LABCORP MISC TEST NAME: NORMAL
LABCORP MISCELLANEOUS TEST: NORMAL
LABCORP MISCELLANEOUS TEST: NORMAL

## 2023-12-13 LAB
LABCORP MISC TEST CODE: NORMAL
LABCORP MISC TEST NAME: NORMAL
LABCORP MISCELLANEOUS TEST: NORMAL

## 2023-12-14 ENCOUNTER — OFFICE VISIT (OUTPATIENT)
Dept: INFECTIOUS DISEASES | Facility: CLINIC | Age: 48
End: 2023-12-14
Payer: COMMERCIAL

## 2023-12-14 ENCOUNTER — LAB VISIT (OUTPATIENT)
Dept: LAB | Facility: HOSPITAL | Age: 48
End: 2023-12-14
Attending: INTERNAL MEDICINE
Payer: COMMERCIAL

## 2023-12-14 VITALS
TEMPERATURE: 98 F | BODY MASS INDEX: 47.42 KG/M2 | DIASTOLIC BLOOD PRESSURE: 66 MMHG | WEIGHT: 284.63 LBS | HEIGHT: 65 IN | SYSTOLIC BLOOD PRESSURE: 130 MMHG | HEART RATE: 78 BPM

## 2023-12-14 DIAGNOSIS — E66.9 OBESITY, UNSPECIFIED CLASSIFICATION, UNSPECIFIED OBESITY TYPE, UNSPECIFIED WHETHER SERIOUS COMORBIDITY PRESENT: ICD-10-CM

## 2023-12-14 DIAGNOSIS — Z79.2 ENCOUNTER FOR LONG-TERM (CURRENT) USE OF ANTIBIOTICS: ICD-10-CM

## 2023-12-14 DIAGNOSIS — R79.89 ABNORMAL LIVER FUNCTION TEST: ICD-10-CM

## 2023-12-14 DIAGNOSIS — B44.9 ASPERGILLOSIS: Primary | ICD-10-CM

## 2023-12-14 DIAGNOSIS — K51.911 ULCERATIVE COLITIS WITH RECTAL BLEEDING, UNSPECIFIED LOCATION: Chronic | ICD-10-CM

## 2023-12-14 DIAGNOSIS — J98.4 PULMONARY CAVITARY LESION: ICD-10-CM

## 2023-12-14 LAB
ALBUMIN SERPL BCP-MCNC: 3.8 G/DL (ref 3.5–5.2)
ALP SERPL-CCNC: 257 U/L (ref 55–135)
ALT SERPL W/O P-5'-P-CCNC: 150 U/L (ref 10–44)
ANION GAP SERPL CALC-SCNC: 9 MMOL/L (ref 8–16)
AST SERPL-CCNC: 210 U/L (ref 10–40)
BASOPHILS # BLD AUTO: 0.1 K/UL (ref 0–0.2)
BASOPHILS NFR BLD: 0.9 % (ref 0–1.9)
BILIRUB SERPL-MCNC: 0.6 MG/DL (ref 0.1–1)
BUN SERPL-MCNC: 17 MG/DL (ref 6–20)
CALCIUM SERPL-MCNC: 9.3 MG/DL (ref 8.7–10.5)
CHLORIDE SERPL-SCNC: 107 MMOL/L (ref 95–110)
CO2 SERPL-SCNC: 22 MMOL/L (ref 23–29)
CREAT SERPL-MCNC: 1.2 MG/DL (ref 0.5–1.4)
DIFFERENTIAL METHOD BLD: ABNORMAL
EOSINOPHIL # BLD AUTO: 0.6 K/UL (ref 0–0.5)
EOSINOPHIL NFR BLD: 4.9 % (ref 0–8)
ERYTHROCYTE [DISTWIDTH] IN BLOOD BY AUTOMATED COUNT: 14.6 % (ref 11.5–14.5)
EST. GFR  (NO RACE VARIABLE): >60 ML/MIN/1.73 M^2
GLUCOSE SERPL-MCNC: 79 MG/DL (ref 70–110)
HCT VFR BLD AUTO: 37.8 % (ref 40–54)
HGB BLD-MCNC: 12.4 G/DL (ref 14–18)
IMM GRANULOCYTES # BLD AUTO: 0.04 K/UL (ref 0–0.04)
IMM GRANULOCYTES NFR BLD AUTO: 0.4 % (ref 0–0.5)
LYMPHOCYTES # BLD AUTO: 2.2 K/UL (ref 1–4.8)
LYMPHOCYTES NFR BLD: 20 % (ref 18–48)
MCH RBC QN AUTO: 34.3 PG (ref 27–31)
MCHC RBC AUTO-ENTMCNC: 32.8 G/DL (ref 32–36)
MCV RBC AUTO: 104 FL (ref 82–98)
MONOCYTES # BLD AUTO: 0.9 K/UL (ref 0.3–1)
MONOCYTES NFR BLD: 8.3 % (ref 4–15)
NEUTROPHILS # BLD AUTO: 7.3 K/UL (ref 1.8–7.7)
NEUTROPHILS NFR BLD: 65.5 % (ref 38–73)
NRBC BLD-RTO: 0 /100 WBC
PLATELET # BLD AUTO: 260 K/UL (ref 150–450)
PMV BLD AUTO: 10.8 FL (ref 9.2–12.9)
POTASSIUM SERPL-SCNC: 5.2 MMOL/L (ref 3.5–5.1)
PROT SERPL-MCNC: 8.5 G/DL (ref 6–8.4)
RBC # BLD AUTO: 3.62 M/UL (ref 4.6–6.2)
SODIUM SERPL-SCNC: 138 MMOL/L (ref 136–145)
WBC # BLD AUTO: 11.13 K/UL (ref 3.9–12.7)

## 2023-12-14 PROCEDURE — 1111F PR DISCHARGE MEDS RECONCILED W/ CURRENT OUTPATIENT MED LIST: ICD-10-PCS | Mod: CPTII,S$GLB,, | Performed by: INTERNAL MEDICINE

## 2023-12-14 PROCEDURE — 1159F PR MEDICATION LIST DOCUMENTED IN MEDICAL RECORD: ICD-10-PCS | Mod: CPTII,S$GLB,, | Performed by: INTERNAL MEDICINE

## 2023-12-14 PROCEDURE — 3061F PR NEG MICROALBUMINURIA RESULT DOCUMENTED/REVIEW: ICD-10-PCS | Mod: CPTII,S$GLB,, | Performed by: INTERNAL MEDICINE

## 2023-12-14 PROCEDURE — 1160F PR REVIEW ALL MEDS BY PRESCRIBER/CLIN PHARMACIST DOCUMENTED: ICD-10-PCS | Mod: CPTII,S$GLB,, | Performed by: INTERNAL MEDICINE

## 2023-12-14 PROCEDURE — 3078F DIAST BP <80 MM HG: CPT | Mod: CPTII,S$GLB,, | Performed by: INTERNAL MEDICINE

## 2023-12-14 PROCEDURE — 3044F HG A1C LEVEL LT 7.0%: CPT | Mod: CPTII,S$GLB,, | Performed by: INTERNAL MEDICINE

## 2023-12-14 PROCEDURE — 3078F PR MOST RECENT DIASTOLIC BLOOD PRESSURE < 80 MM HG: ICD-10-PCS | Mod: CPTII,S$GLB,, | Performed by: INTERNAL MEDICINE

## 2023-12-14 PROCEDURE — 3066F NEPHROPATHY DOC TX: CPT | Mod: CPTII,S$GLB,, | Performed by: INTERNAL MEDICINE

## 2023-12-14 PROCEDURE — 99215 PR OFFICE/OUTPT VISIT, EST, LEVL V, 40-54 MIN: ICD-10-PCS | Mod: S$GLB,,, | Performed by: INTERNAL MEDICINE

## 2023-12-14 PROCEDURE — 3008F BODY MASS INDEX DOCD: CPT | Mod: CPTII,S$GLB,, | Performed by: INTERNAL MEDICINE

## 2023-12-14 PROCEDURE — 1159F MED LIST DOCD IN RCRD: CPT | Mod: CPTII,S$GLB,, | Performed by: INTERNAL MEDICINE

## 2023-12-14 PROCEDURE — 3066F PR DOCUMENTATION OF TREATMENT FOR NEPHROPATHY: ICD-10-PCS | Mod: CPTII,S$GLB,, | Performed by: INTERNAL MEDICINE

## 2023-12-14 PROCEDURE — 3075F PR MOST RECENT SYSTOLIC BLOOD PRESS GE 130-139MM HG: ICD-10-PCS | Mod: CPTII,S$GLB,, | Performed by: INTERNAL MEDICINE

## 2023-12-14 PROCEDURE — 1111F DSCHRG MED/CURRENT MED MERGE: CPT | Mod: CPTII,S$GLB,, | Performed by: INTERNAL MEDICINE

## 2023-12-14 PROCEDURE — 99215 OFFICE O/P EST HI 40 MIN: CPT | Mod: S$GLB,,, | Performed by: INTERNAL MEDICINE

## 2023-12-14 PROCEDURE — 3044F PR MOST RECENT HEMOGLOBIN A1C LEVEL <7.0%: ICD-10-PCS | Mod: CPTII,S$GLB,, | Performed by: INTERNAL MEDICINE

## 2023-12-14 PROCEDURE — 3075F SYST BP GE 130 - 139MM HG: CPT | Mod: CPTII,S$GLB,, | Performed by: INTERNAL MEDICINE

## 2023-12-14 PROCEDURE — 3008F PR BODY MASS INDEX (BMI) DOCUMENTED: ICD-10-PCS | Mod: CPTII,S$GLB,, | Performed by: INTERNAL MEDICINE

## 2023-12-14 PROCEDURE — 36415 COLL VENOUS BLD VENIPUNCTURE: CPT | Performed by: INTERNAL MEDICINE

## 2023-12-14 PROCEDURE — 3061F NEG MICROALBUMINURIA REV: CPT | Mod: CPTII,S$GLB,, | Performed by: INTERNAL MEDICINE

## 2023-12-14 PROCEDURE — 80053 COMPREHEN METABOLIC PANEL: CPT | Performed by: INTERNAL MEDICINE

## 2023-12-14 PROCEDURE — 85025 COMPLETE CBC W/AUTO DIFF WBC: CPT | Performed by: INTERNAL MEDICINE

## 2023-12-14 PROCEDURE — 1160F RVW MEDS BY RX/DR IN RCRD: CPT | Mod: CPTII,S$GLB,, | Performed by: INTERNAL MEDICINE

## 2023-12-14 RX ORDER — LORAZEPAM 0.5 MG/1
0.5 TABLET ORAL EVERY 6 HOURS PRN
Qty: 60 TABLET | Refills: 1 | Status: SHIPPED | OUTPATIENT
Start: 2023-12-14 | End: 2024-03-07 | Stop reason: SDUPTHER

## 2023-12-14 NOTE — PATIENT INSTRUCTIONS
Continue voriconazole 200 mg 2 tablets twice a day  I have sent in a prescription for lorazepam 0.5 mg up to 4 times a day as needed for anxiety, but please be stingy    Please go downstairs for a CBC and CMP    I have ordered the CT chest for 12/27 so that Dr. Villarreal and I can see it before the year's end    I would be happy to discuss with Dr. Jean the option for treating your UC    Please return 12/28 at noon    Addendum: due to elevated liver function, I have asked him to reduce voriconazole to 200 mg TID and skip tonight's dose  He will repeat the liver tests next week

## 2023-12-14 NOTE — PROGRESS NOTES
Subjective:       Patient ID: Jacoby Jean-Baptiste is a 48 y.o. male.    Chief Complaint:: hospital follow up visit     HPI   seen at [facility] for cavitary lung lesions with positive outpatient galactomannan. Admitted with worsened imaging and was started on voriconazole on 11/28, bronch on 11/29 and biopsy showing caseating granulomatous inflammation with ultimately negative afb and fungal stains. He had a fungitell of 480 prior to starting and 137 after starting the voriconazole. The BAL galactomannan was elevated too , though serum galactomannan had dropped. He has not grown aspergillus from BAL or biopsy. Voriconazole trough level on 200 mg bid was 1.0 and on 200 mg TID 1.7. when increased to 400 mg bid, it reached 3.7. He is tolerating this fine, but reducing the buspar and stopping zoloft due to drug interactions has left him more anxious, plus he stopped smoking. We discussed habit forming anxiolytics and I did rx ativan 0.5 mg. He began to have BRBPR about a week ago, about a tablespoon with each BM, which is about 6-7 per day. He is seeing Dr. Jean shortly. He received oral vancomycin while he was on zosyn in the hospital and for a couple of days past.       Review of patient's allergies indicates:  No Known Allergies  Past Medical History:   Diagnosis Date    C. difficile colitis     Cavitary pneumonia 11/2023    pos aspergillus serology    Diabetes mellitus 01/2022    Hyperlipidemia 2015    Hypertension 2015    Insomnia 2015    Ulcerative colitis      Past Surgical History:   Procedure Laterality Date    BRONCHOSCOPY WITH FLUOROSCOPY Left 11/20/2023    Procedure: BRONCHOSCOPY, WITH FLUOROSCOPY;  Surgeon: Lisa Villarreal MD;  Location: [facility];  Service: Pulmonary;  Laterality: Left;    BRONCHOSCOPY WITH FLUOROSCOPY N/A 11/30/2023    Procedure: BRONCHOSCOPY, WITH FLUOROSCOPY;  Surgeon: Lisa Villarreal MD;  Location: [facility] END[facility];  Service: Pulmonary;  Laterality: N/A;    CARDIAC ELECTROPHYSIOLOGY MAPPING  AND ABLATION  2017    SVT    CHOLECYSTECTOMY  2011    COLONOSCOPY N/A 5/3/2022    Procedure: COLONOSCOPY;  Surgeon: Shan Jean III, MD;  Location: [facility];  Service: Endoscopy;  Laterality: N/A;    COLONOSCOPY WITH FECAL MICROBIOTA TRANSFER N/A 3/21/2023    Procedure: COLONOSCOPY, WITH FECAL MICROBIOTA TRANSFER;  Surgeon: David Millan MD;  Location: [facility] ENDO;  Service: Endoscopy;  Laterality: N/A;    ESOPHAGOGASTRODUODENOSCOPY N/A 4/24/2023    Procedure: EGD (ESOPHAGOGASTRODUODENOSCOPY);  Surgeon: Shan Jean III, MD;  Location: [facility] ENDO;  Service: Endoscopy;  Laterality: N/A;    GANGLION CYST EXCISION Left 1992    UMBILICAL HERNIA REPAIR  2011    with mesh     Social History     Tobacco Use    Smoking status: Former     Current packs/day: 1.00     Average packs/day: 1 pack/day for 31.0 years (31.0 ttl pk-yrs)     Types: Cigarettes     Start date: 1993    Smokeless tobacco: Never    Tobacco comments:     Pt states he has stopped smoking with Chantix three weeks ago. 12/1/23   Substance Use Topics    Alcohol use: Yes     Comment: ocass        Family History   Problem Relation Age of Onset    Asthma Mother          Review of Systems    Constitutional: No fever, chills, sweats,   weakness, weight loss. Appetite is variable    Eyes: No change in vision, loss of vision or diplopia, photophobia or change in color vision    ENT: No sore throat, mouth pains, or lesions    Cardiovascular: No angina,   or pedal edema    Respiratory: No shortness of breath,with very mild BAILEY, no cough, wheeze, sputum, or hemoptysis, dry cough    Gastrointestinal: No abdominal pain, nausea, vomiting, diarrhea, constipation, but he started having blood in the stool about a week ago and he notified micheal and is he seeing him tomorrow    Genitourinary: No dysuria, hematuria,      Musculoskeletal: No new pain, joint swelling, or injuries    Integumentary: the rash he had in the hospital is resolving. No more  "itching    Neurological: No dizziness, vertigo, unusual headaches, neuropathy, loss of vision, falls    Psychiatric:  has anxiety, depression    Endocrine: Blood sugars are well controlled    Lymphatic: No lymphadenopathy,  , malignancy    VAD:     Objective:      Blood pressure 130/66, pulse 78, temperature 98.1 °F (36.7 °C), height 5' 5" (1.651 m), weight 129.1 kg (284 lb 9.6 oz), SpO2 (P) 97 %. Body mass index is 47.36 kg/m².  Physical Exam      General: Alert and attentive, cooperative and in no distress    Eyes: Pupils equal, round, reactive to light, anicteric, EOMI    Neck: Supple, non-tender, no thyromegaly or masses    ENT: EAC patent, TM normal, nares patent, no oral lesions, teeth in extremely poor condition, no thrush    Cardiovascular: Regular rate and rhythm, no murmurs, rubs, or gallop    Respiratory: Lungs clear without wheezes, rales, rubs or rhonci    Gastrointestinal:  Soft, obese, bowel sounds normal,  no mass or organomegaly, mild tenderness, no distention    Genitourinary:   no flank tenderness    Integumentary: numerous neurotic excoriations on his forearms     Vascular: No peripheral edema or phlebitis, warm and well perfused    Musculoskeletal: Ambulates without difficulty, no acute arthritis, synovitis or myositis. Normal muscle bulk and strength    Lymphatic: No cervical, axillary LAD    Neurological: Normal LOC, cranial nerves, speech,  gait    Psychiatric: Normal mood, speech,  demeanor     Wound:    VAD:        Recent Diagnostics:   Reviewed path, cultures, procedures, imaging, labs    Latest Reference Range & Units 12/14/23 14:07   ALP 55 - 135 U/L 257 (H)   PROTEIN TOTAL 6.0 - 8.4 g/dL 8.5 (H)   Albumin 3.5 - 5.2 g/dL 3.8   BILIRUBIN TOTAL 0.1 - 1.0 mg/dL 0.6   AST 10 - 40 U/L 210 (H)   ALT 10 - 44 U/L 150 (H)   (H): Data is abnormally high       Assessment and Plan:           Aspergillosis  -     CT Chest With Contrast; Future; Expected date: 12/27/2023    Encounter for long-term " (current) use of antibiotics  -     Comprehensive Metabolic Panel; Standing  -     CBC Auto Differential; Future; Expected date: 12/14/2023    Pulmonary cavitary lesion    Obesity, unspecified classification, unspecified obesity type, unspecified whether serious comorbidity present    Ulcerative colitis with rectal bleeding, unspecified location    Abnormal liver function test  -     CK; Future; Expected date: 12/14/2023    Other orders  -     LORazepam (ATIVAN) 0.5 MG tablet; Take 1 tablet (0.5 mg total) by mouth every 6 (six) hours as needed for Anxiety.  Dispense: 60 tablet; Refill: 1    Continue voriconazole 200 mg 2 tablets twice a day  I have sent in a prescription for lorazepam 0.5 mg up to 4 times a day as needed for anxiety, but please be stingy    Please go downstairs for a CBC and CMP    I have ordered the CT chest for 12/27 so that Dr. Villarreal and I can see it before the year's end    I would be happy to discuss with Dr. Jean the option for treating your UC    Please return 12/28 at noon    Addendum: due to elevated liver function, I have asked him to reduce voriconazole to 200 mg TID and skip tonight's dose  He will repeat the liver tests next week      This note was created using Dragon voice recognition software that occasionally misinterpreted phrases or words.

## 2023-12-18 ENCOUNTER — PATIENT MESSAGE (OUTPATIENT)
Dept: FAMILY MEDICINE | Facility: CLINIC | Age: 48
End: 2023-12-18
Payer: COMMERCIAL

## 2023-12-19 ENCOUNTER — LAB VISIT (OUTPATIENT)
Dept: LAB | Facility: HOSPITAL | Age: 48
End: 2023-12-19
Attending: INTERNAL MEDICINE
Payer: COMMERCIAL

## 2023-12-19 DIAGNOSIS — Z79.2 ENCOUNTER FOR LONG-TERM (CURRENT) USE OF ANTIBIOTICS: ICD-10-CM

## 2023-12-19 DIAGNOSIS — R79.89 ABNORMAL LIVER FUNCTION TEST: ICD-10-CM

## 2023-12-19 LAB
ALBUMIN SERPL BCP-MCNC: 4 G/DL (ref 3.5–5.2)
ALP SERPL-CCNC: 234 U/L (ref 55–135)
ALT SERPL W/O P-5'-P-CCNC: 89 U/L (ref 10–44)
ANION GAP SERPL CALC-SCNC: 9 MMOL/L (ref 8–16)
AST SERPL-CCNC: 58 U/L (ref 10–40)
BILIRUB SERPL-MCNC: 0.5 MG/DL (ref 0.1–1)
BUN SERPL-MCNC: 15 MG/DL (ref 6–20)
CALCIUM SERPL-MCNC: 9.4 MG/DL (ref 8.7–10.5)
CHLORIDE SERPL-SCNC: 106 MMOL/L (ref 95–110)
CK SERPL-CCNC: 40 U/L (ref 20–200)
CO2 SERPL-SCNC: 24 MMOL/L (ref 23–29)
CREAT SERPL-MCNC: 0.9 MG/DL (ref 0.5–1.4)
EST. GFR  (NO RACE VARIABLE): >60 ML/MIN/1.73 M^2
FUNGUS SPEC CULT: NORMAL
FUNGUS SPEC CULT: NORMAL
GLUCOSE SERPL-MCNC: 88 MG/DL (ref 70–110)
POTASSIUM SERPL-SCNC: 4.6 MMOL/L (ref 3.5–5.1)
PROT SERPL-MCNC: 8.5 G/DL (ref 6–8.4)
SODIUM SERPL-SCNC: 139 MMOL/L (ref 136–145)

## 2023-12-19 PROCEDURE — 80053 COMPREHEN METABOLIC PANEL: CPT | Performed by: INTERNAL MEDICINE

## 2023-12-19 PROCEDURE — 82550 ASSAY OF CK (CPK): CPT | Performed by: INTERNAL MEDICINE

## 2023-12-19 PROCEDURE — 36415 COLL VENOUS BLD VENIPUNCTURE: CPT | Performed by: INTERNAL MEDICINE

## 2023-12-22 ENCOUNTER — HOSPITAL ENCOUNTER (OUTPATIENT)
Dept: RADIOLOGY | Facility: HOSPITAL | Age: 48
Discharge: HOME | End: 2023-12-22
Attending: INTERNAL MEDICINE
Payer: COMMERCIAL

## 2023-12-22 DIAGNOSIS — B44.9 ASPERGILLOSIS: ICD-10-CM

## 2023-12-22 PROCEDURE — 71260 CT THORAX DX C+: CPT | Mod: TC,PO

## 2023-12-22 PROCEDURE — 25500020 PHARM REV CODE 255: Mod: PO | Performed by: INTERNAL MEDICINE

## 2023-12-22 RX ADMIN — IOHEXOL 100 ML: 350 INJECTION, SOLUTION INTRAVENOUS at 03:12

## 2023-12-26 ENCOUNTER — LAB VISIT (OUTPATIENT)
Dept: LAB | Facility: HOSPITAL | Age: 48
End: 2023-12-26
Attending: INTERNAL MEDICINE
Payer: COMMERCIAL

## 2023-12-26 DIAGNOSIS — Z79.2 ENCOUNTER FOR LONG-TERM (CURRENT) USE OF ANTIBIOTICS: ICD-10-CM

## 2023-12-26 LAB
ALBUMIN SERPL BCP-MCNC: 3.8 G/DL (ref 3.5–5.2)
ALP SERPL-CCNC: 154 U/L (ref 55–135)
ALT SERPL W/O P-5'-P-CCNC: 35 U/L (ref 10–44)
ANION GAP SERPL CALC-SCNC: 5 MMOL/L (ref 8–16)
AST SERPL-CCNC: 27 U/L (ref 10–40)
BILIRUB SERPL-MCNC: 0.5 MG/DL (ref 0.1–1)
BUN SERPL-MCNC: 13 MG/DL (ref 6–20)
CALCIUM SERPL-MCNC: 9.4 MG/DL (ref 8.7–10.5)
CHLORIDE SERPL-SCNC: 103 MMOL/L (ref 95–110)
CO2 SERPL-SCNC: 30 MMOL/L (ref 23–29)
CREAT SERPL-MCNC: 1 MG/DL (ref 0.5–1.4)
EST. GFR  (NO RACE VARIABLE): >60 ML/MIN/1.73 M^2
GLUCOSE SERPL-MCNC: 91 MG/DL (ref 70–110)
POTASSIUM SERPL-SCNC: 5.1 MMOL/L (ref 3.5–5.1)
PROT SERPL-MCNC: 8 G/DL (ref 6–8.4)
SODIUM SERPL-SCNC: 138 MMOL/L (ref 136–145)

## 2023-12-26 PROCEDURE — 36415 COLL VENOUS BLD VENIPUNCTURE: CPT | Performed by: INTERNAL MEDICINE

## 2023-12-26 PROCEDURE — 80053 COMPREHEN METABOLIC PANEL: CPT | Performed by: INTERNAL MEDICINE

## 2023-12-27 ENCOUNTER — OFFICE VISIT (OUTPATIENT)
Dept: PULMONOLOGY | Facility: CLINIC | Age: 48
End: 2023-12-27
Payer: COMMERCIAL

## 2023-12-27 VITALS
HEART RATE: 86 BPM | DIASTOLIC BLOOD PRESSURE: 85 MMHG | SYSTOLIC BLOOD PRESSURE: 120 MMHG | HEIGHT: 65 IN | OXYGEN SATURATION: 94 % | WEIGHT: 287 LBS | BODY MASS INDEX: 47.82 KG/M2

## 2023-12-27 DIAGNOSIS — B44.9 ASPERGILLOSIS: Primary | ICD-10-CM

## 2023-12-27 DIAGNOSIS — R06.09 DYSPNEA ON EXERTION: ICD-10-CM

## 2023-12-27 DIAGNOSIS — R05.9 COUGH, UNSPECIFIED TYPE: ICD-10-CM

## 2023-12-27 DIAGNOSIS — Z72.0 TOBACCO ABUSE: ICD-10-CM

## 2023-12-27 PROCEDURE — 3079F DIAST BP 80-89 MM HG: CPT | Mod: CPTII,S$GLB,, | Performed by: INTERNAL MEDICINE

## 2023-12-27 PROCEDURE — 3079F PR MOST RECENT DIASTOLIC BLOOD PRESSURE 80-89 MM HG: ICD-10-PCS | Mod: CPTII,S$GLB,, | Performed by: INTERNAL MEDICINE

## 2023-12-27 PROCEDURE — 1111F DSCHRG MED/CURRENT MED MERGE: CPT | Mod: CPTII,S$GLB,, | Performed by: INTERNAL MEDICINE

## 2023-12-27 PROCEDURE — 99214 OFFICE O/P EST MOD 30 MIN: CPT | Mod: S$GLB,,, | Performed by: INTERNAL MEDICINE

## 2023-12-27 PROCEDURE — 3066F NEPHROPATHY DOC TX: CPT | Mod: CPTII,S$GLB,, | Performed by: INTERNAL MEDICINE

## 2023-12-27 PROCEDURE — 3044F HG A1C LEVEL LT 7.0%: CPT | Mod: CPTII,S$GLB,, | Performed by: INTERNAL MEDICINE

## 2023-12-27 PROCEDURE — 3061F PR NEG MICROALBUMINURIA RESULT DOCUMENTED/REVIEW: ICD-10-PCS | Mod: CPTII,S$GLB,, | Performed by: INTERNAL MEDICINE

## 2023-12-27 PROCEDURE — 1159F PR MEDICATION LIST DOCUMENTED IN MEDICAL RECORD: ICD-10-PCS | Mod: CPTII,S$GLB,, | Performed by: INTERNAL MEDICINE

## 2023-12-27 PROCEDURE — 3074F PR MOST RECENT SYSTOLIC BLOOD PRESSURE < 130 MM HG: ICD-10-PCS | Mod: CPTII,S$GLB,, | Performed by: INTERNAL MEDICINE

## 2023-12-27 PROCEDURE — 99214 PR OFFICE/OUTPT VISIT, EST, LEVL IV, 30-39 MIN: ICD-10-PCS | Mod: S$GLB,,, | Performed by: INTERNAL MEDICINE

## 2023-12-27 PROCEDURE — 3066F PR DOCUMENTATION OF TREATMENT FOR NEPHROPATHY: ICD-10-PCS | Mod: CPTII,S$GLB,, | Performed by: INTERNAL MEDICINE

## 2023-12-27 PROCEDURE — 1159F MED LIST DOCD IN RCRD: CPT | Mod: CPTII,S$GLB,, | Performed by: INTERNAL MEDICINE

## 2023-12-27 PROCEDURE — 3061F NEG MICROALBUMINURIA REV: CPT | Mod: CPTII,S$GLB,, | Performed by: INTERNAL MEDICINE

## 2023-12-27 PROCEDURE — 3074F SYST BP LT 130 MM HG: CPT | Mod: CPTII,S$GLB,, | Performed by: INTERNAL MEDICINE

## 2023-12-27 PROCEDURE — 3008F BODY MASS INDEX DOCD: CPT | Mod: CPTII,S$GLB,, | Performed by: INTERNAL MEDICINE

## 2023-12-27 PROCEDURE — 1111F PR DISCHARGE MEDS RECONCILED W/ CURRENT OUTPATIENT MED LIST: ICD-10-PCS | Mod: CPTII,S$GLB,, | Performed by: INTERNAL MEDICINE

## 2023-12-27 PROCEDURE — 3008F PR BODY MASS INDEX (BMI) DOCUMENTED: ICD-10-PCS | Mod: CPTII,S$GLB,, | Performed by: INTERNAL MEDICINE

## 2023-12-27 PROCEDURE — 3044F PR MOST RECENT HEMOGLOBIN A1C LEVEL <7.0%: ICD-10-PCS | Mod: CPTII,S$GLB,, | Performed by: INTERNAL MEDICINE

## 2023-12-27 RX ORDER — PREDNISONE 10 MG/1
10 TABLET ORAL 2 TIMES DAILY
COMMUNITY
Start: 2023-12-27 | End: 2024-01-16

## 2023-12-27 NOTE — PROGRESS NOTES
SUBJECTIVE:    Patient ID: Jacoby Jean-Baptiste is a 48 y.o. male.    Chief Complaint: Hospital Follow Up    HPI The paitient is here with his aspergillosis getting better but his UC getting worse.  He is on voriconazole.  He is also having chest pain from anxiety as he is off of his antianxiety meds because of the vori.  He does not feel well.  Past Medical History:   Diagnosis Date    C. difficile colitis     Cavitary pneumonia 11/2023    pos aspergillus serology    Diabetes mellitus 01/2022    Hyperlipidemia 2015    Hypertension 2015    Insomnia 2015    Ulcerative colitis      Past Surgical History:   Procedure Laterality Date    BRONCHOSCOPY WITH FLUOROSCOPY Left 11/20/2023    Procedure: BRONCHOSCOPY, WITH FLUOROSCOPY;  Surgeon: Lisa Villarreal MD;  Location: [facility];  Service: Pulmonary;  Laterality: Left;    BRONCHOSCOPY WITH FLUOROSCOPY N/A 11/30/2023    Procedure: BRONCHOSCOPY, WITH FLUOROSCOPY;  Surgeon: Lisa Villarreal MD;  Location: [facility];  Service: Pulmonary;  Laterality: N/A;    CARDIAC ELECTROPHYSIOLOGY MAPPING AND ABLATION  2017    SVT    CHOLECYSTECTOMY  2011    COLONOSCOPY N/A 5/3/2022    Procedure: COLONOSCOPY;  Surgeon: Shan Jean III, MD;  Location: [facility];  Service: Endoscopy;  Laterality: N/A;    COLONOSCOPY WITH FECAL MICROBIOTA TRANSFER N/A 3/21/2023    Procedure: COLONOSCOPY, WITH FECAL MICROBIOTA TRANSFER;  Surgeon: David Millan MD;  Location: [facility];  Service: Endoscopy;  Laterality: N/A;    ESOPHAGOGASTRODUODENOSCOPY N/A 4/24/2023    Procedure: EGD (ESOPHAGOGASTRODUODENOSCOPY);  Surgeon: Shan Jean III, MD;  Location: [facility];  Service: Endoscopy;  Laterality: N/A;    GANGLION CYST EXCISION Left 1992    UMBILICAL HERNIA REPAIR  2011    with mesh     Family History   Problem Relation Age of Onset    Asthma Mother         Social History:   Marital Status: Single  Occupation:  Works at a car dealership.  Alcohol History:  reports current alcohol  use.  Tobacco History:  reports that he has quit smoking. His smoking use included cigarettes. He started smoking about 31 years ago. He has a 31.0 pack-year smoking history. He has never used smokeless tobacco.  Drug History:  reports that he does not currently use drugs.    Review of patient's allergies indicates:  No Known Allergies    Current Outpatient Medications   Medication Sig Dispense Refill    busPIRone (BUSPAR) 5 MG Tab TAKE 1/2 TABLET (2.5 mg) daily while on voriconazole 30 tablet 0    colesevelam (WELCHOL) 625 mg tablet Take 3 tablets (1,875 mg total) by mouth 2 (two) times daily with meals. Please separate from voriconazole 180 tablet 0    dicyclomine (BENTYL) 10 MG capsule Take 10 mg by mouth 3 (three) times daily as needed.      diphenhydrAMINE (BENADRYL) 25 mg capsule Take 1 capsule (25 mg total) by mouth every 6 (six) hours as needed for Itching. 30 capsule 0    LORazepam (ATIVAN) 0.5 MG tablet Take 1 tablet (0.5 mg total) by mouth every 6 (six) hours as needed for Anxiety. 60 tablet 1    metFORMIN (GLUCOPHAGE-XR) 500 MG ER 24hr tablet Take 1 tablet (500 mg total) by mouth 2 (two) times daily with meals. 180 tablet 1    metoprolol succinate (TOPROL-XL) 50 MG 24 hr tablet Take 1 tablet (50 mg total) by mouth once daily. 90 tablet 1    pantoprazole (PROTONIX) 40 MG tablet Take 1 tablet (40 mg total) by mouth 2 (two) times daily. 180 tablet 3    predniSONE (DELTASONE) 10 MG tablet Take 10 mg by mouth 2 (two) times daily.      promethazine (PHENERGAN) 12.5 MG Tab Take 12.5 mg by mouth 4 (four) times daily as needed.      semaglutide (OZEMPIC) 1 mg/dose (4 mg/3 mL) Inject 1 mg into the skin every 7 days. 3 each 1    voriconazole (VFEND) 50 MG Tab Take 8 tablets (400 mg total) by mouth 2 (two) times daily. 480 tablet 0    zolpidem (AMBIEN) 10 mg Tab Take 1 tablet (10 mg total) by mouth nightly as needed (insomnia). 30 tablet 2    simvastatin (ZOCOR) 20 MG tablet Take 1 tablet (20 mg total) by mouth  "every evening. (Patient not taking: Reported on 12/14/2023) 90 tablet 1    varenicline (CHANTIX STARTING MONTH BOX) 0.5 mg (11)- 1 mg (42) tablet Take one 0.5mg tab by mouth once daily X3 days,then increase to one 0.5mg tab twice daily X4 days,then increase to one 1mg tab twice daily (Patient not taking: Reported on 12/27/2023) 1 each 0     No current facility-administered medications for this visit.     Facility-Administered Medications Ordered in Other Visits   Medication Dose Route Frequency Provider Last Rate Last Admin    lactated ringers infusion   Intravenous Continuous David Millan MD 75 mL/hr at 03/21/23 1252 New Bag at 03/21/23 1252       Alpha-1 Antitrypsin:  Last PFT:   Last CT:  12/22/2023  1. Improving bilateral pulmonary abnormalities as discussed above since 11/28/2023, without complete resolution of such.  2. Unchanged coronary artery calcifications.  3. Unchanged left renal calculi, partially visualized.    Review of Systems  General: Feeling Well.  Eyes: Vision is good.  ENT:  No sinusitis or pharyngitis.   Heart:: No chest pain or palpitations.  Lungs: He is still getting short of breath with exertion.  He is still smoking 1 cigarette a day. No sputum. He is coughing a dry cough some.  GI: Having bloody diarrhea.  : No dysuria, hesitancy, or nocturia.  Musculoskeletal: No joint pain or myalgias.  Skin: No lesions or rashes.  Neuro: No headaches or neuropathy.  Lymph: No edema or adenopathy.  Psych: No anxiety or depression.  Endo: No weight change.    OBJECTIVE:      /85 (BP Location: Right arm, Patient Position: Sitting, BP Method: Large (Manual))   Pulse 86   Ht 5' 5" (1.651 m)   Wt 130.2 kg (287 lb)   SpO2 (!) 94%   BMI 47.76 kg/m²     Physical Exam  GENERAL:  Mid aged obese patient in no distress.  HEENT: Pupils equal and reactive. Extraocular movements intact. Nose intact.      Pharynx moist.  NECK: Supple.   HEART: Regular rate and rhythm. No murmur or gallop " auscultated.  LUNGS: Clear to auscultation and percussion. Lung excursion symmetrical. No change in fremitus. No adventitial noises.  ABDOMEN: Bowel sounds present. Non-tender, no masses palpated.  EXTREMITIES: Normal muscle tone and joint movement, no cyanosis or clubbing.   LYMPHATICS: No adenopathy palpated, no edema.  SKIN: Dry, intact, no lesions.   NEURO: Cranial nerves II-XII intact. Motor strength 5/5 bilaterally, upper and lower extremities.  PSYCH:  Depressed affect.    Assessment:       1. Aspergillosis    2. Tobacco abuse    3. Dyspnea on exertion    4. Cough, unspecified type          Plan:       Aspergillosis  -     Complete PFT with bronchodilator; Future  -     Stress test, pulmonary; Future; Expected date: 12/27/2023    Tobacco abuse    Dyspnea on exertion    Cough, unspecified type      Will call with results  Seeing Dr. Patton tomorrow  Follow up in about 3 months (around 3/27/2024).

## 2023-12-27 NOTE — PATIENT INSTRUCTIONS
Aspergillosis  -     Complete PFT with bronchodilator; Future  -     Stress test, pulmonary; Future; Expected date: 12/27/2023  Follow up in 3 months

## 2023-12-28 ENCOUNTER — OFFICE VISIT (OUTPATIENT)
Dept: INFECTIOUS DISEASES | Facility: CLINIC | Age: 48
End: 2023-12-28
Payer: COMMERCIAL

## 2023-12-28 VITALS
TEMPERATURE: 99 F | DIASTOLIC BLOOD PRESSURE: 74 MMHG | HEIGHT: 65 IN | HEART RATE: 88 BPM | WEIGHT: 286.63 LBS | SYSTOLIC BLOOD PRESSURE: 122 MMHG | OXYGEN SATURATION: 95 % | BODY MASS INDEX: 47.75 KG/M2

## 2023-12-28 DIAGNOSIS — K51.911 ULCERATIVE COLITIS WITH RECTAL BLEEDING, UNSPECIFIED LOCATION: ICD-10-CM

## 2023-12-28 DIAGNOSIS — J98.4 PULMONARY CAVITARY LESION: ICD-10-CM

## 2023-12-28 DIAGNOSIS — B44.9 ASPERGILLOSIS: Primary | ICD-10-CM

## 2023-12-28 DIAGNOSIS — Z79.2 ENCOUNTER FOR LONG-TERM (CURRENT) USE OF ANTIBIOTICS: ICD-10-CM

## 2023-12-28 PROCEDURE — 3078F PR MOST RECENT DIASTOLIC BLOOD PRESSURE < 80 MM HG: ICD-10-PCS | Mod: CPTII,S$GLB,, | Performed by: INTERNAL MEDICINE

## 2023-12-28 PROCEDURE — 3066F PR DOCUMENTATION OF TREATMENT FOR NEPHROPATHY: ICD-10-PCS | Mod: CPTII,S$GLB,, | Performed by: INTERNAL MEDICINE

## 2023-12-28 PROCEDURE — 3061F PR NEG MICROALBUMINURIA RESULT DOCUMENTED/REVIEW: ICD-10-PCS | Mod: CPTII,S$GLB,, | Performed by: INTERNAL MEDICINE

## 2023-12-28 PROCEDURE — 3066F NEPHROPATHY DOC TX: CPT | Mod: CPTII,S$GLB,, | Performed by: INTERNAL MEDICINE

## 2023-12-28 PROCEDURE — 3044F HG A1C LEVEL LT 7.0%: CPT | Mod: CPTII,S$GLB,, | Performed by: INTERNAL MEDICINE

## 2023-12-28 PROCEDURE — 1159F PR MEDICATION LIST DOCUMENTED IN MEDICAL RECORD: ICD-10-PCS | Mod: CPTII,S$GLB,, | Performed by: INTERNAL MEDICINE

## 2023-12-28 PROCEDURE — 3074F SYST BP LT 130 MM HG: CPT | Mod: CPTII,S$GLB,, | Performed by: INTERNAL MEDICINE

## 2023-12-28 PROCEDURE — 1111F PR DISCHARGE MEDS RECONCILED W/ CURRENT OUTPATIENT MED LIST: ICD-10-PCS | Mod: CPTII,S$GLB,, | Performed by: INTERNAL MEDICINE

## 2023-12-28 PROCEDURE — 3044F PR MOST RECENT HEMOGLOBIN A1C LEVEL <7.0%: ICD-10-PCS | Mod: CPTII,S$GLB,, | Performed by: INTERNAL MEDICINE

## 2023-12-28 PROCEDURE — 3061F NEG MICROALBUMINURIA REV: CPT | Mod: CPTII,S$GLB,, | Performed by: INTERNAL MEDICINE

## 2023-12-28 PROCEDURE — 3008F PR BODY MASS INDEX (BMI) DOCUMENTED: ICD-10-PCS | Mod: CPTII,S$GLB,, | Performed by: INTERNAL MEDICINE

## 2023-12-28 PROCEDURE — 99214 PR OFFICE/OUTPT VISIT, EST, LEVL IV, 30-39 MIN: ICD-10-PCS | Mod: S$GLB,,, | Performed by: INTERNAL MEDICINE

## 2023-12-28 PROCEDURE — 3074F PR MOST RECENT SYSTOLIC BLOOD PRESSURE < 130 MM HG: ICD-10-PCS | Mod: CPTII,S$GLB,, | Performed by: INTERNAL MEDICINE

## 2023-12-28 PROCEDURE — 1111F DSCHRG MED/CURRENT MED MERGE: CPT | Mod: CPTII,S$GLB,, | Performed by: INTERNAL MEDICINE

## 2023-12-28 PROCEDURE — 3008F BODY MASS INDEX DOCD: CPT | Mod: CPTII,S$GLB,, | Performed by: INTERNAL MEDICINE

## 2023-12-28 PROCEDURE — 3078F DIAST BP <80 MM HG: CPT | Mod: CPTII,S$GLB,, | Performed by: INTERNAL MEDICINE

## 2023-12-28 PROCEDURE — 1159F MED LIST DOCD IN RCRD: CPT | Mod: CPTII,S$GLB,, | Performed by: INTERNAL MEDICINE

## 2023-12-28 PROCEDURE — 99214 OFFICE O/P EST MOD 30 MIN: CPT | Mod: S$GLB,,, | Performed by: INTERNAL MEDICINE

## 2023-12-28 NOTE — PATIENT INSTRUCTIONS
Please continue voriconazole 200 mg three times a day     We will get monthly liver tests and a galactomannan and fungitell at the 3 month marcos (mid feb)    We may continue the voriconazole to the 6 month marcos considering that you are on medications that suppress immune function.     Please watch your blood sugars carefully while on the prednisone    Please make an appt with Dr. Aguilar or your cardiologist about your chest discomfort, ASAP

## 2023-12-28 NOTE — PROGRESS NOTES
Subjective:       Patient ID: Jacoby Jean-Baptiste is a 48 y.o. male.    Chief Complaint:: Follow-up  12/14/23  Follow-up       seen at [facility] for cavitary lung lesions with positive outpatient galactomannan. Admitted with worsened imaging and was started on voriconazole on 11/28, bronch on 11/29 and biopsy showing caseating granulomatous inflammation with ultimately negative afb and fungal stains. He had a fungitell of 480 prior to starting and 137 after starting the voriconazole. The BAL galactomannan was elevated too , though serum galactomannan had dropped. He has not grown aspergillus from BAL or biopsy. Voriconazole trough level on 200 mg bid was 1.0 and on 200 mg TID 1.7. when increased to 400 mg bid, it reached 3.7. He is tolerating this fine, but reducing the buspar and stopping zoloft due to drug interactions has left him more anxious, plus he stopped smoking. We discussed habit forming anxiolytics and I did rx ativan 0.5 mg. He began to have BRBPR about a week ago, about a tablespoon with each BM, which is about 6-7 per day. He is seeing Dr. Jean shortly. He received oral vancomycin while he was on zosyn in the hospital and for a couple of days past.     12/28/23:  taking 200 mg voriconazole three times a day. Liver function test abnormalities resolved.   He is smoking 1 cigarette every couple of days. Using lorazepam once a day, 0.5mg. he is having chest discomfort which he feels is anxiety. It is intermittent. It does resolve with lorazepam. He will contact cardiologist or primary for follow up. Nuclear stress in June was not perfectly normal.   Started prednisone 10 mg twice a day yesterday, awaiting auth for new drug for Colitis. ?Entyvio.  Reviewed CT chest which is over 50% better.     Review of patient's allergies indicates:  No Known Allergies  Past Medical History:   Diagnosis Date    C. difficile colitis     Cavitary pneumonia 11/2023    pos aspergillus serology    Diabetes mellitus 01/2022     Hyperlipidemia 2015    Hypertension 2015    Insomnia 2015    Ulcerative colitis      Past Surgical History:   Procedure Laterality Date    BRONCHOSCOPY WITH FLUOROSCOPY Left 11/20/2023    Procedure: BRONCHOSCOPY, WITH FLUOROSCOPY;  Surgeon: Lisa Villarreal MD;  Location: [facility];  Service: Pulmonary;  Laterality: Left;    BRONCHOSCOPY WITH FLUOROSCOPY N/A 11/30/2023    Procedure: BRONCHOSCOPY, WITH FLUOROSCOPY;  Surgeon: Lisa Villarreal MD;  Location: [facility] END[facility];  Service: Pulmonary;  Laterality: N/A;    CARDIAC ELECTROPHYSIOLOGY MAPPING AND ABLATION  2017    SVT    CHOLECYSTECTOMY  2011    COLONOSCOPY N/A 5/3/2022    Procedure: COLONOSCOPY;  Surgeon: Shan Jean III, MD;  Location: [facility];  Service: Endoscopy;  Laterality: N/A;    COLONOSCOPY WITH FECAL MICROBIOTA TRANSFER N/A 3/21/2023    Procedure: COLONOSCOPY, WITH FECAL MICROBIOTA TRANSFER;  Surgeon: David Millan MD;  Location: [facility];  Service: Endoscopy;  Laterality: N/A;    ESOPHAGOGASTRODUODENOSCOPY N/A 4/24/2023    Procedure: EGD (ESOPHAGOGASTRODUODENOSCOPY);  Surgeon: Shan Jean III, MD;  Location: [facility];  Service: Endoscopy;  Laterality: N/A;    GANGLION CYST EXCISION Left 1992    UMBILICAL HERNIA REPAIR  2011    with mesh     Social History     Tobacco Use    Smoking status: Former     Current packs/day: 1.00     Average packs/day: 1 pack/day for 31.0 years (31.0 ttl pk-yrs)     Types: Cigarettes     Start date: 1993    Smokeless tobacco: Never    Tobacco comments:     Pt states he has stopped smoking with Chantix three weeks ago. 12/1/23   Substance Use Topics    Alcohol use: Yes     Comment: ocass        Family History   Problem Relation Age of Onset    Asthma Mother          Review of Systems    Constitutional: No fever, chills, sweats,   weakness, weight loss. Appetite is variable    Eyes: No change in vision, loss of vision or diplopia, photophobia or change in color vision    ENT: No sore throat, mouth pains,  "or lesions    Cardiovascular: No pedal edema. See HPI. Highest heart rate is about 100 on his phone/monitor. I reviewed them on his phone and he has NSR.     Respiratory: No shortness of breath,with persistent very mild BAILEY, no cough, wheeze, sputum, or hemoptysis, dry cough    Gastrointestinal: No abdominal pain, nausea, vomiting, diarrhea, constipation, having 3 loose BMs per day, with some blood with most stools, red water. No clots  Genitourinary: No dysuria, hematuria,      Musculoskeletal: No new pain, joint swelling, or injuries    Integumentary: the rash he had in the hospital is resolving. No more itching and no more picking    Neurological: No dizziness, vertigo, unusual headaches, neuropathy, loss of vision, falls    Psychiatric:  has anxiety, depression    Endocrine: Blood sugars are well controlled    Lymphatic: No lymphadenopathy,  , malignancy    VAD:     Objective:      Blood pressure 122/74, pulse 88, temperature 98.6 °F (37 °C), height 5' 5" (1.651 m), weight 130 kg (286 lb 9.6 oz), SpO2 95 %. Body mass index is 47.69 kg/m².  Physical Exam      General: Alert and attentive, cooperative and in no distress, but anxious and fidgety    Eyes: Pupils equal, round, reactive to light, anicteric, EOMI    Neck: Supple, non-tender, no thyromegaly or masses    ENT: EAC patent, TM normal, nares patent, no oral lesions, teeth in extremely poor condition, no thrush    Cardiovascular: Regular rate and rhythm, no murmurs, rubs, or gallop    Respiratory: Lungs clear without wheezes, rales, rubs or rhonci    Gastrointestinal:  Soft, obese, bowel sounds normal,  no mass or organomegaly, mild tenderness, no distention    Genitourinary:   no flank tenderness    Integumentary: numerous neurotic excoriations on his forearms are healing     Vascular: No peripheral edema or phlebitis, warm and well perfused    Musculoskeletal: Ambulates without difficulty, no acute arthritis, synovitis or myositis. Normal muscle bulk and " strength    Lymphatic: No cervical, axillary LAD    Neurological: Normal LOC, cranial nerves, speech,  gait    Psychiatric: Normal mood, speech,  demeanor     Wound:    VAD:        Recent Diagnostics:   Reviewed path, cultures, procedures, imaging, labs    Latest Reference Range & Units 12/14/23 14:07 12/19/23 06:10 12/26/23 06:02   ALP 55 - 135 U/L 257 (H) 234 (H) 154 (H)   PROTEIN TOTAL 6.0 - 8.4 g/dL 8.5 (H) 8.5 (H) 8.0   Albumin 3.5 - 5.2 g/dL 3.8 4.0 3.8   BILIRUBIN TOTAL 0.1 - 1.0 mg/dL 0.6 0.5 0.5   AST 10 - 40 U/L 210 (H) 58 (H) 27   ALT 10 - 44 U/L 150 (H) 89 (H) 35   (H): Data is abnormally high          Component Ref Range & Units 4 wk ago 1 mo ago   Aspergillus Antigen 0.00 - 0.49 Index 0.05 1.16 High          Fungitell   11/20= 480  11/29= 137    CT chest 12/22  1. Improving bilateral pulmonary abnormalities as discussed above since 11/28/2023, without complete resolution of such.  2. Unchanged coronary artery calcifications.  3. Unchanged left renal calculi, partially visualized.     Assessment and Plan:           Aspergillosis    Pulmonary cavitary lesion    Encounter for long-term (current) use of antibiotics    Ulcerative colitis with rectal bleeding, unspecified location       Please continue voriconazole 200 mg three times a day     We will get monthly liver tests and a galactomannan and fungitell at the 3 month marcos (mid feb)    We may continue the voriconazole to the 6 month marcos considering that you are on medications that suppress immune function.     Please watch your blood sugars carefully while on the prednisone    Please make an appt with Dr. Aguilar or your cardiologist about your chest discomfort, ASAP      This note was created using Dragon voice recognition software that occasionally misinterpreted phrases or words.

## 2024-01-02 LAB
FUNGUS SPEC CULT: NORMAL

## 2024-01-03 LAB

## 2024-01-09 ENCOUNTER — HOSPITAL ENCOUNTER (OUTPATIENT)
Dept: PULMONOLOGY | Facility: HOSPITAL | Age: 49
Discharge: HOME | End: 2024-01-09
Attending: INTERNAL MEDICINE
Payer: COMMERCIAL

## 2024-01-09 ENCOUNTER — PATIENT MESSAGE (OUTPATIENT)
Dept: FAMILY MEDICINE | Facility: CLINIC | Age: 49
End: 2024-01-09

## 2024-01-09 ENCOUNTER — OFFICE VISIT (OUTPATIENT)
Dept: FAMILY MEDICINE | Facility: CLINIC | Age: 49
End: 2024-01-09
Payer: COMMERCIAL

## 2024-01-09 VITALS
BODY MASS INDEX: 48.05 KG/M2 | HEART RATE: 93 BPM | WEIGHT: 288.38 LBS | TEMPERATURE: 98 F | HEIGHT: 65 IN | OXYGEN SATURATION: 96 % | RESPIRATION RATE: 18 BRPM | SYSTOLIC BLOOD PRESSURE: 132 MMHG | DIASTOLIC BLOOD PRESSURE: 82 MMHG

## 2024-01-09 VITALS — HEIGHT: 65 IN | BODY MASS INDEX: 47.65 KG/M2 | WEIGHT: 286 LBS

## 2024-01-09 DIAGNOSIS — B44.9 ASPERGILLOSIS: ICD-10-CM

## 2024-01-09 DIAGNOSIS — R69 TAKING MEDICATION FOR CHRONIC DISEASE: Primary | ICD-10-CM

## 2024-01-09 DIAGNOSIS — J84.89 OTHER SPECIFIED INTERSTITIAL PULMONARY DISEASES: ICD-10-CM

## 2024-01-09 DIAGNOSIS — R07.9 CHEST PAIN, UNSPECIFIED TYPE: Primary | ICD-10-CM

## 2024-01-09 DIAGNOSIS — K51.919 ULCERATIVE COLITIS WITH COMPLICATION, UNSPECIFIED LOCATION: Chronic | ICD-10-CM

## 2024-01-09 DIAGNOSIS — G47.00 INSOMNIA, UNSPECIFIED TYPE: ICD-10-CM

## 2024-01-09 DIAGNOSIS — Z72.0 TOBACCO USE: ICD-10-CM

## 2024-01-09 DIAGNOSIS — R00.2 PALPITATIONS: ICD-10-CM

## 2024-01-09 DIAGNOSIS — Z13.6 ENCOUNTER FOR SCREENING FOR CARDIOVASCULAR DISORDERS: ICD-10-CM

## 2024-01-09 DIAGNOSIS — I48.91 ATRIAL FIBRILLATION, UNSPECIFIED TYPE: ICD-10-CM

## 2024-01-09 DIAGNOSIS — E66.01 MORBID OBESITY: ICD-10-CM

## 2024-01-09 DIAGNOSIS — E11.9 TYPE 2 DIABETES MELLITUS WITHOUT COMPLICATION, WITHOUT LONG-TERM CURRENT USE OF INSULIN: ICD-10-CM

## 2024-01-09 DIAGNOSIS — D84.821 IMMUNODEFICIENCY DUE TO DRUGS (CODE): ICD-10-CM

## 2024-01-09 DIAGNOSIS — F41.9 ANXIETY: ICD-10-CM

## 2024-01-09 DIAGNOSIS — Z71.6 ENCOUNTER FOR SMOKING CESSATION COUNSELING: ICD-10-CM

## 2024-01-09 PROCEDURE — 93010 ELECTROCARDIOGRAM REPORT: CPT | Mod: S$GLB,,, | Performed by: INTERNAL MEDICINE

## 2024-01-09 PROCEDURE — 1159F MED LIST DOCD IN RCRD: CPT | Mod: CPTII,S$GLB,,

## 2024-01-09 PROCEDURE — 94010 BREATHING CAPACITY TEST: CPT

## 2024-01-09 PROCEDURE — 94727 GAS DIL/WSHOT DETER LNG VOL: CPT

## 2024-01-09 PROCEDURE — 3075F SYST BP GE 130 - 139MM HG: CPT | Mod: CPTII,S$GLB,,

## 2024-01-09 PROCEDURE — 99214 OFFICE O/P EST MOD 30 MIN: CPT | Mod: S$GLB,,,

## 2024-01-09 PROCEDURE — 3008F BODY MASS INDEX DOCD: CPT | Mod: CPTII,S$GLB,,

## 2024-01-09 PROCEDURE — 93005 ELECTROCARDIOGRAM TRACING: CPT | Mod: S$GLB,,,

## 2024-01-09 PROCEDURE — 3079F DIAST BP 80-89 MM HG: CPT | Mod: CPTII,S$GLB,,

## 2024-01-09 PROCEDURE — 3072F LOW RISK FOR RETINOPATHY: CPT | Mod: CPTII,S$GLB,,

## 2024-01-09 PROCEDURE — 94729 DIFFUSING CAPACITY: CPT

## 2024-01-09 PROCEDURE — 94618 PULMONARY STRESS TESTING: CPT

## 2024-01-09 PROCEDURE — 99999 PR PBB SHADOW E&M-EST. PATIENT-LVL IV: CPT | Mod: PBBFAC,,,

## 2024-01-09 PROCEDURE — 94060 EVALUATION OF WHEEZING: CPT

## 2024-01-09 RX ORDER — PROMETHAZINE HYDROCHLORIDE 25 MG/1
25 TABLET ORAL 4 TIMES DAILY PRN
COMMUNITY
Start: 2023-12-27

## 2024-01-09 RX ORDER — VARENICLINE TARTRATE 0.5 (11)-1
KIT ORAL
Qty: 1 EACH | Refills: 0 | Status: CANCELLED | OUTPATIENT
Start: 2024-01-09

## 2024-01-09 RX ORDER — ZOLPIDEM TARTRATE 10 MG/1
10 TABLET ORAL NIGHTLY PRN
Qty: 30 TABLET | Refills: 2 | Status: SHIPPED | OUTPATIENT
Start: 2024-01-09 | End: 2024-07-09

## 2024-01-09 RX ORDER — METOPROLOL TARTRATE 25 MG/1
TABLET, FILM COATED ORAL
Qty: 1 TABLET | Refills: 0 | Status: SHIPPED | OUTPATIENT
Start: 2024-01-09 | End: 2024-01-16

## 2024-01-09 RX ORDER — VARENICLINE TARTRATE 1 MG/1
1 TABLET, FILM COATED ORAL 2 TIMES DAILY
Qty: 180 TABLET | Refills: 1 | Status: SHIPPED | OUTPATIENT
Start: 2024-01-09

## 2024-01-09 NOTE — CARE UPDATE
"   01/09/24 0725   6MW Test   Ordering Provider Dr. Lisa Villarreal MD   Performing nurse/tech/RT Susan Loo RRT   Diagnosis Qualify for Oxygen   Height 5' 5" (1.651 m)   Weight 129.7 kg (286 lb)   BMI (Calculated) 47.6   Predicted Distance Meters (Calculated) 461.48 meters   Patient Race    6MWT Status completed without stopping   Was O2 used? No   6MW Distance walked (feet) 800 feet   Distance walked (meters) 243.84 meters   Did patient stop? No   Type of assistive device(s) used? no assistive devices   Is extra documentation required for this patient? Yes   Pre-Exercise   Oxygen Saturation 96 %   Supplemental Oxygen Room Air   Heart Rate 96 bpm   Rob Dyspnea Rating  light   Post Exercise   Oxygen Saturation 97 %   Supplemental Oxygen Room Air   Heart Rate 101 bpm   Rob Dyspnea Rating  light   Recovery 1 Minute   Oxygen Saturation 98 %   Supplemental Oxygen Room Air   Heart Rate 92 bpm   Interpretation   Is procedure ready for interpretation? Yes   Did the patient stop or pause? No   Total Laps Walked 4   Final Partial Lap Distance (feet) 0 feet   Total Distance Feet (Calculated) 800 feet   Total Distance Meters (Calculated) 243.84 meters   Percentage of Predicted (Calculated) 52.84   Peak VO2 (Calculated) 11.3   Mets 3.23   Has The Patient Had a Previous Six Minute Walk Test? No   Comments This a Non-Hypoxemic 6 min. walk. Patient did not qualify for Home Oxygen.   Oxygen Qualification   Oxygen Qualification? No       "

## 2024-01-09 NOTE — PROGRESS NOTES
Subjective:       Patient ID: Jacoby Jean-Baptiste is a 49 y.o. male.    Chief Complaint: Follow-up (Chest pains)    Devin Jean-Baptiste is a 49-year-old male patient who presents to clinic today with complaints of chest pain.  Medical history of anxiety, SVT, AFib hyperlipidemia, C diff, aspergillosis, type 2 diabetes, ulcerative colitis, tobacco use, and insomnia.  Patient states he has been having chest pain in the left side of his chest for about a month.  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  He feels chest pain could be related to increased anxiety.  He is also having palpitations and some shortness a breath.  Stress test in June was negative for ischemia.  Recent CMP with no electrolyte abnormalities.  Last EKG normal sinus rhythm.  He was recently put on Ativan for anxiety but he does not feel like it is working as well as his previous medication.  He has only been taking it once daily because he is concerned about becoming addicted.  This medication was prescribed by Infectious Disease DrDanni since he is unable to take his other medications.  He does continue to take BuSpar 5 mg once a day.  History of SVT, hyperlipidemia, type 2 diabetes, ulcerative colitis, tobacco use.  He is smoking every day and has been taking Chantix once daily.  He is needing refills.  Insomnia and needs refills of Ambien.      Conclusion       ·  Abnormal myocardial perfusion scan.  ·  There is a moderate to severe intensity, moderate sized, equivocal perfusion abnormality that is fixed in the basal to apical inferior wall(s). This finding is equivocal due to diaphragm shadow.  ·  There are no other significant perfusion abnormalities.  ·  The gated perfusion images showed an ejection fraction of 71% at rest. The gated perfusion images showed an ejection fraction of 75% post stress. Normal ejection  fraction is greater than 53%.  ·  The ECG portion of the study is negative for ischemia.  ·  The patient reported no chest pain during the stress test.  ·  There were no arrhythmias during stress.        Review of patient's allergies indicates:  No Known Allergies  Social Determinants of Health     Tobacco Use: Medium Risk (1/9/2024)    Patient History     Smoking Tobacco Use: Former     Smokeless Tobacco Use: Never     Passive Exposure: Not on file   Alcohol Use: Not At Risk (4/21/2023)    AUDIT-C     Frequency of Alcohol Consumption: Never     Average Number of Drinks: Patient does not drink     Frequency of Binge Drinking: Never   Financial Resource Strain: Low Risk  (4/21/2023)    Overall Financial Resource Strain (CARDIA)     Difficulty of Paying Living Expenses: Not very hard   Food Insecurity: No Food Insecurity (4/21/2023)    Hunger Vital Sign     Worried About Running Out of Food in the Last Year: Never true     Ran Out of Food in the Last Year: Never true   Transportation Needs: No Transportation Needs (4/21/2023)    PRAPARE - Transportation     Lack of Transportation (Medical): No     Lack of Transportation (Non-Medical): No   Physical Activity: Inactive (4/21/2023)    Exercise Vital Sign     Days of Exercise per Week: 0 days     Minutes of Exercise per Session: 0 min   Stress: No Stress Concern Present (4/21/2023)    [employer] [place] [employer] - Occupational Stress Questionnaire     Feeling of Stress : Only a little   Social Connections: Socially Isolated (4/21/2023)    Social Connection and Isolation Panel [NHANES]     Frequency of Communication with Friends and Family: Twice a week     Frequency of Social Gatherings with Friends and Family: Twice a week     Attends Scientologist Services: Never     Active Member of Clubs or Organizations: No     Attends Club or Organization Meetings: Never     Marital Status: Never    Housing Stability: Low Risk  (4/21/2023)    Housing Stability Vital  Sign     Unable to Pay for Housing in the Last Year: No     Number of Places Lived in the Last Year: 1     Unstable Housing in the Last Year: No   Depression: Low Risk  (1/9/2024)    Depression     Last PHQ-4: Flowsheet Data: 0      Past Medical History:   Diagnosis Date    C. difficile colitis     Cavitary pneumonia 11/2023    pos aspergillus serology    Diabetes mellitus 01/2022    Hyperlipidemia 2015    Hypertension 2015    Insomnia 2015    Ulcerative colitis       Past Surgical History:   Procedure Laterality Date    BRONCHOSCOPY WITH FLUOROSCOPY Left 11/20/2023    Procedure: BRONCHOSCOPY, WITH FLUOROSCOPY;  Surgeon: Lisa Villarreal MD;  Location: [facility];  Service: Pulmonary;  Laterality: Left;    BRONCHOSCOPY WITH FLUOROSCOPY N/A 11/30/2023    Procedure: BRONCHOSCOPY, WITH FLUOROSCOPY;  Surgeon: Lisa Villarreal MD;  Location: [facility];  Service: Pulmonary;  Laterality: N/A;    CARDIAC ELECTROPHYSIOLOGY MAPPING AND ABLATION  2017    SVT    CHOLECYSTECTOMY  2011    COLONOSCOPY N/A 5/3/2022    Procedure: COLONOSCOPY;  Surgeon: Shan Jean III, MD;  Location: [facility];  Service: Endoscopy;  Laterality: N/A;    COLONOSCOPY WITH FECAL MICROBIOTA TRANSFER N/A 3/21/2023    Procedure: COLONOSCOPY, WITH FECAL MICROBIOTA TRANSFER;  Surgeon: David Millan MD;  Location: [facility];  Service: Endoscopy;  Laterality: N/A;    ESOPHAGOGASTRODUODENOSCOPY N/A 4/24/2023    Procedure: EGD (ESOPHAGOGASTRODUODENOSCOPY);  Surgeon: Shan Jean III, MD;  Location: [facility];  Service: Endoscopy;  Laterality: N/A;    GANGLION CYST EXCISION Left 1992    UMBILICAL HERNIA REPAIR  2011    with mesh      Social History     Socioeconomic History    Marital status: Single         Current Outpatient Medications:     busPIRone (BUSPAR) 5 MG Tab, TAKE 1/2 TABLET (2.5 mg) daily while on voriconazole, Disp: 30 tablet, Rfl: 0    colesevelam (WELCHOL) 625 mg tablet, Take 3 tablets (1,875 mg total) by mouth 2 (two) times  daily with meals. Please separate from voriconazole, Disp: 180 tablet, Rfl: 0    dicyclomine (BENTYL) 10 MG capsule, Take 10 mg by mouth 3 (three) times daily as needed., Disp: , Rfl:     LORazepam (ATIVAN) 0.5 MG tablet, Take 1 tablet (0.5 mg total) by mouth every 6 (six) hours as needed for Anxiety., Disp: 60 tablet, Rfl: 1    metFORMIN (GLUCOPHAGE-XR) 500 MG ER 24hr tablet, Take 1 tablet (500 mg total) by mouth 2 (two) times daily with meals., Disp: 180 tablet, Rfl: 1    metoprolol succinate (TOPROL-XL) 50 MG 24 hr tablet, Take 1 tablet (50 mg total) by mouth once daily., Disp: 90 tablet, Rfl: 1    pantoprazole (PROTONIX) 40 MG tablet, Take 1 tablet (40 mg total) by mouth 2 (two) times daily., Disp: 180 tablet, Rfl: 3    predniSONE (DELTASONE) 10 MG tablet, Take 10 mg by mouth 2 (two) times daily., Disp: , Rfl:     promethazine (PHENERGAN) 25 MG tablet, Take 25 mg by mouth 4 (four) times daily as needed for Nausea., Disp: , Rfl:     semaglutide (OZEMPIC) 1 mg/dose (4 mg/3 mL), Inject 1 mg into the skin every 7 days., Disp: 3 each, Rfl: 1    voriconazole (VFEND) 50 MG Tab, Take 8 tablets (400 mg total) by mouth 2 (two) times daily., Disp: 480 tablet, Rfl: 0    diphenhydrAMINE (BENADRYL) 25 mg capsule, Take 1 capsule (25 mg total) by mouth every 6 (six) hours as needed for Itching. (Patient not taking: Reported on 1/9/2024), Disp: 30 capsule, Rfl: 0    simvastatin (ZOCOR) 20 MG tablet, Take 1 tablet (20 mg total) by mouth every evening. (Patient not taking: Reported on 12/14/2023), Disp: 90 tablet, Rfl: 1    varenicline (CHANTIX) 1 mg Tab, Take 1 tablet (1 mg total) by mouth 2 (two) times daily., Disp: 180 tablet, Rfl: 1    zolpidem (AMBIEN) 10 mg Tab, Take 1 tablet (10 mg total) by mouth nightly as needed (insomnia)., Disp: 30 tablet, Rfl: 2  No current facility-administered medications for this visit.    Facility-Administered Medications Ordered in Other Visits:     lactated ringers infusion, , Intravenous,  Continuous, David Millan MD, Last Rate: 75 mL/hr at 03/21/23 1252, New Bag at 03/21/23 1252    Lab Results   Component Value Date    WBC 13.42 (H) 01/09/2024    HGB 13.3 (L) 01/09/2024    HCT 41.5 01/09/2024     01/09/2024    CHOL 142 10/27/2023    TRIG 83 10/27/2023    HDL 44 10/27/2023    ALT 14 01/09/2024    AST 13 01/09/2024     01/09/2024    K 5.2 (H) 01/09/2024     01/09/2024    CREATININE 1.1 01/09/2024    BUN 20 01/09/2024    CO2 26 01/09/2024    TSH 0.550 05/24/2023    PSA 0.24 03/10/2021    HGBA1C 5.2 10/27/2023    MICROALBUR 1.2 01/26/2022       Review of Systems   Constitutional:  Negative for chills and fever.   Respiratory:  Positive for chest tightness and shortness of breath.    Cardiovascular:  Positive for chest pain and palpitations. Negative for leg swelling.   Gastrointestinal: Negative.        Objective:      Physical Exam  Vitals reviewed.   Constitutional:       General: He is not in acute distress.     Appearance: He is obese.   Cardiovascular:      Rate and Rhythm: Normal rate and regular rhythm.      Pulses: Normal pulses.      Heart sounds: Normal heart sounds.   Pulmonary:      Effort: Pulmonary effort is normal.      Breath sounds: Normal breath sounds.   Neurological:      Mental Status: He is alert.   Psychiatric:         Mood and Affect: Mood normal. Affect is flat.         Speech: Speech normal.         Behavior: Behavior normal. Behavior is cooperative.         Thought Content: Thought content normal.         Judgment: Judgment normal.         Assessment:       1. Chest pain, unspecified type    2. Insomnia, unspecified type    3. Encounter for screening for cardiovascular disorders    4. Anxiety    5. Palpitations    6. Aspergillosis    7. Type 2 diabetes mellitus without complication, without long-term current use of insulin    8. Ulcerative colitis with complication, unspecified location    9. Tobacco use    10. Encounter for smoking cessation counseling     11. Other specified interstitial pulmonary diseases    12. Immunodeficiency due to drugs (CODE)    13. Morbid obesity    14. Atrial fibrillation, unspecified type        Plan:       Jacoby was seen today for follow-up.    Diagnoses and all orders for this visit:    Chest pain, unspecified type  -     IN OFFICE EKG 12-LEAD (to [place])  -     D-DIMER, QUANTITATIVE; Future  -     TROPONIN I; Future  -     COMPREHENSIVE METABOLIC PANEL; Future  -     CBC Auto Differential; Future  -     CK; Future  -     TROPONIN I  -     CT Cardiac Scoring; Future    Insomnia, unspecified type  -     zolpidem (AMBIEN) 10 mg Tab; Take 1 tablet (10 mg total) by mouth nightly as needed (insomnia).    Encounter for screening for cardiovascular disorders  -     CT Cardiac Scoring; Future    Anxiety    Palpitations    Aspergillosis    Type 2 diabetes mellitus without complication, without long-term current use of insulin    Ulcerative colitis with complication, unspecified location    Tobacco use  -     varenicline (CHANTIX) 1 mg Tab; Take 1 tablet (1 mg total) by mouth 2 (two) times daily.    Encounter for smoking cessation counseling  -     varenicline (CHANTIX) 1 mg Tab; Take 1 tablet (1 mg total) by mouth 2 (two) times daily.    Other specified interstitial pulmonary diseases    Immunodeficiency due to drugs (CODE)    Morbid obesity    Atrial fibrillation, unspecified type    Other orders  The following orders have not been finalized:  -     Cancel: varenicline (CHANTIX STARTING MONTH BOX) 0.5 mg (11)- 1 mg (42) tablet    Chest pain   - have labs today as ordered  - CT cardiac scoring scan  - EKG normal sinus rhythm today with a V rate of 88 bpm.    - patient will go to ER if chest pains persist or worsen.    Insomnia   - continue Ambien as prescribed.  Refills x3.   checked  - patient aware he should not take Ativan within 3-4 hours of Ambien.    Chantix for smoking cessation.  Continue Ativan to help with anxiety.  Patient  instructed to avoid Ativan a minimum of 3-4 hours of Ambien.    Follow-up dependent upon results of lab work.  We will also follow-up with patient once results from CT cardiac scoring scan is complete.  Patient should schedule an appointment to see his cardiologist also.

## 2024-01-10 ENCOUNTER — TELEPHONE (OUTPATIENT)
Dept: PULMONOLOGY | Facility: CLINIC | Age: 49
End: 2024-01-10

## 2024-01-10 NOTE — TELEPHONE ENCOUNTER
Walk is non hypoxemic. Walked 243.84 meters.  52.84 % predicted.   PFT shows moderate obstruction with good response to bronchodilator in small airway, mild restriction, moderate diffusion defect.

## 2024-01-10 NOTE — TELEPHONE ENCOUNTER
The patient's PFT show moderate obstruction and a moderate diffusion defect.  His 6 minute walk was non hypoxemic but he only covered 58% of the predicted distance.  Called him and told him about his PFTs.  He states he is on Chantix trying to stop.  Encouraged this.

## 2024-01-16 ENCOUNTER — OFFICE VISIT (OUTPATIENT)
Dept: CARDIOLOGY | Facility: CLINIC | Age: 49
End: 2024-01-16
Payer: COMMERCIAL

## 2024-01-16 VITALS
RESPIRATION RATE: 16 BRPM | HEIGHT: 65 IN | DIASTOLIC BLOOD PRESSURE: 80 MMHG | BODY MASS INDEX: 48.15 KG/M2 | HEART RATE: 86 BPM | SYSTOLIC BLOOD PRESSURE: 122 MMHG | OXYGEN SATURATION: 98 % | WEIGHT: 289 LBS

## 2024-01-16 DIAGNOSIS — I48.91 ATRIAL FIBRILLATION, UNSPECIFIED TYPE: ICD-10-CM

## 2024-01-16 DIAGNOSIS — Z86.79 HISTORY OF SUPRAVENTRICULAR TACHYCARDIA: ICD-10-CM

## 2024-01-16 DIAGNOSIS — A04.72 C. DIFFICILE DIARRHEA: ICD-10-CM

## 2024-01-16 DIAGNOSIS — Z98.890 H/O CARDIAC RADIOFREQUENCY ABLATION: ICD-10-CM

## 2024-01-16 DIAGNOSIS — J18.9 PNEUMONIA OF BOTH LUNGS DUE TO INFECTIOUS ORGANISM, UNSPECIFIED PART OF LUNG: ICD-10-CM

## 2024-01-16 DIAGNOSIS — R00.2 PALPITATIONS: Primary | ICD-10-CM

## 2024-01-16 DIAGNOSIS — E78.00 PURE HYPERCHOLESTEROLEMIA: ICD-10-CM

## 2024-01-16 PROCEDURE — 1160F RVW MEDS BY RX/DR IN RCRD: CPT | Mod: CPTII,S$GLB,, | Performed by: INTERNAL MEDICINE

## 2024-01-16 PROCEDURE — 93000 ELECTROCARDIOGRAM COMPLETE: CPT | Mod: S$GLB,,, | Performed by: INTERNAL MEDICINE

## 2024-01-16 PROCEDURE — 1159F MED LIST DOCD IN RCRD: CPT | Mod: CPTII,S$GLB,, | Performed by: INTERNAL MEDICINE

## 2024-01-16 PROCEDURE — 99999 PR PBB SHADOW E&M-EST. PATIENT-LVL IV: CPT | Mod: PBBFAC,,, | Performed by: INTERNAL MEDICINE

## 2024-01-16 PROCEDURE — 3072F LOW RISK FOR RETINOPATHY: CPT | Mod: CPTII,S$GLB,, | Performed by: INTERNAL MEDICINE

## 2024-01-16 PROCEDURE — 3008F BODY MASS INDEX DOCD: CPT | Mod: CPTII,S$GLB,, | Performed by: INTERNAL MEDICINE

## 2024-01-16 PROCEDURE — 3079F DIAST BP 80-89 MM HG: CPT | Mod: CPTII,S$GLB,, | Performed by: INTERNAL MEDICINE

## 2024-01-16 PROCEDURE — 99214 OFFICE O/P EST MOD 30 MIN: CPT | Mod: S$GLB,,, | Performed by: INTERNAL MEDICINE

## 2024-01-16 PROCEDURE — 3074F SYST BP LT 130 MM HG: CPT | Mod: CPTII,S$GLB,, | Performed by: INTERNAL MEDICINE

## 2024-01-16 NOTE — PROGRESS NOTES
Subjective:    Patient ID:  Jacoby Jean-Baptiste is a 49 y.o. male patient here for evaluation Follow-up (He has been having palpitations again since December, he feels it is his anxiety./He has had some stabbing pain )      History of Present Illness:     49-year-old gentleman who has history of pulmonary infection and pneumonia more recently was hospitalized for the same.  And he has been experiencing increasing amount of palpitations lately he was taken off his anxiety medicines and apparently feels anxious at times feels like his heart is skipping beats or palpating all day long.  No fainting sensation noted no syncope occurred.  No swelling in the lower extremities a some residual cough persisting without any significant sputum production.  He is stopped taking magnesium as well.    He does have some nausea and persistent dyspeptic symptoms associated with ulcerative colitis        Review of patient's allergies indicates:  No Known Allergies    Past Medical History:   Diagnosis Date    C. difficile colitis     Cavitary pneumonia 11/2023    pos aspergillus serology    Diabetes mellitus 01/2022    Hyperlipidemia 2015    Hypertension 2015    Insomnia 2015    Ulcerative colitis      Past Surgical History:   Procedure Laterality Date    BRONCHOSCOPY WITH FLUOROSCOPY Left 11/20/2023    Procedure: BRONCHOSCOPY, WITH FLUOROSCOPY;  Surgeon: Lisa Villarreal MD;  Location: [facility];  Service: Pulmonary;  Laterality: Left;    BRONCHOSCOPY WITH FLUOROSCOPY N/A 11/30/2023    Procedure: BRONCHOSCOPY, WITH FLUOROSCOPY;  Surgeon: Lisa Villarreal MD;  Location: [facility];  Service: Pulmonary;  Laterality: N/A;    CARDIAC ELECTROPHYSIOLOGY MAPPING AND ABLATION  2017    SVT    CHOLECYSTECTOMY  2011    COLONOSCOPY N/A 5/3/2022    Procedure: COLONOSCOPY;  Surgeon: Shan Jean III, MD;  Location: [facility];  Service: Endoscopy;  Laterality: N/A;    COLONOSCOPY WITH FECAL MICROBIOTA TRANSFER N/A 3/21/2023    Procedure:  COLONOSCOPY, WITH FECAL MICROBIOTA TRANSFER;  Surgeon: David Millan MD;  Location[facility] ENDO;  Service: Endoscopy;  Laterality: N/A;    ESOPHAGOGASTRODUODENOSCOPY N/A 4/24/2023    Procedure: EGD (ESOPHAGOGASTRODUODENOSCOPY);  Surgeon: Shan Jean III, MD;  Location: [facility];  Service: Endoscopy;  Laterality: N/A;    GANGLION CYST EXCISION Left 1992    UMBILICAL HERNIA REPAIR  2011    with mesh     Social History     Tobacco Use    Smoking status: Former     Current packs/day: 1.00     Average packs/day: 1 pack/day for 31.0 years (31.0 ttl pk-yrs)     Types: Cigarettes     Start date: 1993    Smokeless tobacco: Never    Tobacco comments:     Pt states he has stopped smoking with Chantix three weeks ago. 12/1/23   Substance Use Topics    Alcohol use: Yes     Comment: ocass    Drug use: Not Currently        Review of Systems:    As noted in HPI in addition      REVIEW OF SYSTEMS  CARDIOVASCULAR: No recent chest pain, palpitations, arm, neck, or jaw pain  RESPIRATORY: No recent fever, cough chills, SOB or congestion  : No blood in the urine  GI: No Nausea, vomiting, constipation, diarrhea, blood, or reflux.  MUSCULOSKELETAL: No myalgias  NEURO: No lightheadedness or dizziness  EYES: No Double vision, blurry, vision or headache              Objective        Vitals:    01/16/24 1526   BP: 122/80   Pulse: 86   Resp: 16       LIPIDS - LAST 2   Lab Results   Component Value Date    CHOL 142 10/27/2023    CHOL 103 (L) 01/04/2023    HDL 44 10/27/2023    HDL 41 01/04/2023    LDLCALC 81.4 10/27/2023    LDLCALC 44.8 (L) 01/04/2023    TRIG 83 10/27/2023    TRIG 86 01/04/2023    CHOLHDL 31.0 10/27/2023    CHOLHDL 39.8 01/04/2023       CBC - LAST 2  Lab Results   Component Value Date    WBC 13.42 (H) 01/09/2024    WBC 11.13 12/14/2023    RBC 3.92 (L) 01/09/2024    RBC 3.62 (L) 12/14/2023    HGB 13.3 (L) 01/09/2024    HGB 12.4 (L) 12/14/2023    HCT 41.5 01/09/2024    HCT 37.8 (L) 12/14/2023     (H) 01/09/2024  "    (H) 12/14/2023    MCH 33.9 (H) 01/09/2024    MCH 34.3 (H) 12/14/2023    MCHC 32.0 01/09/2024    MCHC 32.8 12/14/2023    RDW 15.0 (H) 01/09/2024    RDW 14.6 (H) 12/14/2023     01/09/2024     12/14/2023    MPV 10.9 01/09/2024    MPV 10.8 12/14/2023    GRAN 9.6 (H) 01/09/2024    GRAN 71.1 01/09/2024    LYMPH 2.3 01/09/2024    LYMPH 17.2 (L) 01/09/2024    MONO 1.1 (H) 01/09/2024    MONO 8.0 01/09/2024    BASO 0.08 01/09/2024    BASO 0.10 12/14/2023    NRBC 0 01/09/2024    NRBC 0 12/14/2023       CHEMISTRY & LIVER FUNCTION - LAST 2  Lab Results   Component Value Date     01/09/2024     12/26/2023    K 5.2 (H) 01/09/2024    K 5.1 12/26/2023     01/09/2024     12/26/2023    CO2 26 01/09/2024    CO2 30 (H) 12/26/2023    ANIONGAP 7 (L) 01/09/2024    ANIONGAP 5 (L) 12/26/2023    BUN 20 01/09/2024    BUN 13 12/26/2023    CREATININE 1.1 01/09/2024    CREATININE 1.0 12/26/2023     01/09/2024    GLU 91 12/26/2023    CALCIUM 9.6 01/09/2024    CALCIUM 9.4 12/26/2023    MG 1.6 12/03/2023    MG 1.9 12/02/2023    ALBUMIN 4.0 01/09/2024    ALBUMIN 3.8 12/26/2023    PROT 7.6 01/09/2024    PROT 8.0 12/26/2023    ALKPHOS 93 01/09/2024    ALKPHOS 154 (H) 12/26/2023    ALT 14 01/09/2024    ALT 35 12/26/2023    AST 13 01/09/2024    AST 27 12/26/2023    BILITOT 0.4 01/09/2024    BILITOT 0.5 12/26/2023        CARDIAC PROFILE - LAST 2  Lab Results   Component Value Date    BNP 7 11/28/2023    BNP 18 11/17/2023    CPK 42 01/09/2024    CPK 40 12/19/2023     11/20/2023    TROPONINIHS 2.9 01/09/2024    TROPONINIHS 3.5 11/28/2023        COAGULATION - LAST 2  No results found for: "LABPT", "INR", "APTT"    ENDOCRINE & PSA - LAST 2  Lab Results   Component Value Date    HGBA1C 5.2 10/27/2023    HGBA1C 4.8 04/21/2023    MICROALBUR 1.2 01/26/2022    TSH 0.550 05/24/2023    TSH 1.510 01/11/2023    PROCAL 1.878 (H) 11/30/2023    PROCAL 5.349 (H) 11/28/2023    PSA 0.24 03/10/2021    "     ECHOCARDIOGRAM RESULTS  Results for orders placed during the hospital encounter of 11/17/23    Transesophageal echo (KATJA)    Interpretation Summary    Left Ventricle: There is normal systolic function with a visually estimated ejection fraction of 55 - 60%.    Right Ventricle: Normal right ventricular cavity size. Systolic function is normal.    Left Atrium: Agitated saline study of the atrial septum is negative, suggesting absence of intracardiac shunt at the atrial level. There appears to be lipomatous hypertrophy of the interatrial septum. Appendage velocity is normal at greater than 40 cm/sec. There is no thrombus in the left atrial appendage.    Aortic Valve: The aortic valve is a trileaflet valve.    Mitral Valve: There is mild regurgitation.    Tricuspid Valve: There is mild regurgitation.    No definitive evidence of infective endocarditis on this study.  Consider repeating KATJA in a week if clinical suspicion still persists.      CURRENT/PREVIOUS VISIT EKG  Results for orders placed or performed in visit on 01/09/24   IN OFFICE EKG 12-LEAD (to [place])    Collection Time: 01/09/24 11:18 AM    Narrative    Test Reason : R07.9,    Vent. Rate : 088 BPM     Atrial Rate : 088 BPM     P-R Int : 132 ms          QRS Dur : 070 ms      QT Int : 358 ms       P-R-T Axes : -05 002 062 degrees     QTc Int : 433 ms    Normal sinus rhythm  Nonspecific T wave abnormality  Abnormal ECG  When compared with ECG of 09-JAN-2024 11:16,  No significant change was found    Referred By:             Confirmed By:      No valid procedures specified.   Results for orders placed during the hospital encounter of 06/22/23    Nuclear Stress - Cardiology Interpreted    Interpretation Summary    Abnormal myocardial perfusion scan.    There is a moderate to severe intensity, moderate sized, equivocal perfusion abnormality that is fixed in the basal to apical inferior wall(s). This finding is equivocal due to diaphragm shadow.    There are no  other significant perfusion abnormalities.    The gated perfusion images showed an ejection fraction of 71% at rest. The gated perfusion images showed an ejection fraction of 75% post stress. Normal ejection fraction is greater than 53%.    The ECG portion of the study is negative for ischemia.    The patient reported no chest pain during the stress test.    There were no arrhythmias during stress.    No valid procedures specified.    PHYSICAL EXAM  CONSTITUTIONAL: Well built, well nourished in no apparent distress  NECK: no carotid bruit, no JVD  LUNGS: CTA  CHEST WALL: no tenderness  HEART: regular rate and rhythm, S1, S2 normal, no murmur, click, rub or gallop   ABDOMEN: soft, non-tender; bowel sounds normal; no masses,  no organomegaly  EXTREMITIES: Extremities normal, no edema, no calf tenderness noted  NEURO: AAO X 3    I HAVE REVIEWED :    The vital signs, nurses notes, and all the pertinent radiology and labs.    01/16/2024 EKG shows normal sinus rhythm rate of 86 and within normal limits.    Current Outpatient Medications   Medication Instructions    busPIRone (BUSPAR) 5 MG Tab TAKE 1/2 TABLET (2.5 mg) daily while on voriconazole    colesevelam (WELCHOL) 1,875 mg, Oral, 2 times daily with meals, Please separate from voriconazole    dicyclomine (BENTYL) 10 mg, Oral, 3 times daily PRN    diphenhydrAMINE (BENADRYL) 25 mg, Oral, Every 6 hours PRN    LORazepam (ATIVAN) 0.5 mg, Oral, Every 6 hours PRN    metFORMIN (GLUCOPHAGE-XR) 500 mg, Oral, 2 times daily with meals    metoprolol succinate (TOPROL-XL) 50 mg, Oral, Daily    OZEMPIC 1 mg, Subcutaneous, Every 7 days    pantoprazole (PROTONIX) 40 mg, Oral, 2 times daily    promethazine (PHENERGAN) 25 mg, Oral, 4 times daily PRN    simvastatin (ZOCOR) 20 mg, Oral, Nightly    varenicline (CHANTIX) 1 mg, Oral, 2 times daily    voriconazole (VFEND) 400 mg, Oral, 2 times daily    zolpidem (AMBIEN) 10 mg, Oral, Nightly PRN          Assessment & Plan      Palpitations  Patient feels recurrence of palpitations occurring with varying intensity and duration throughout the day.  Recommend to do the following  1. Continue on Toprol-XL 50 mg daily  2. Start back on magnesium oxide 400 mg daily  3. Obtain a event recorder  detect extent of arrhythmias if any.      4. Evaluate any correlation with palpitations and symptoms of anxiety.      History of supraventricular tachycardia  History of SVT currently on metoprolol encouraged magnesium and event recorder.    H/O cardiac radiofrequency ablation  He is status post ablated therapy evaluate for any recurrence of arrhythmias and breakthrough pathways    Atrial fibrillation, unspecified type  Event recorder and is arrhythmias noted may be considering alternate therapy based on findings of a event recorder    Pneumonia of both lungs due to infectious organism  Recent bout of pneumonia November since then he is gotten better continue on present therapy    Hyperlipidemia  He is presently on Zocor but he has not taken it as he is taking antifungal therapy at this time.  And also start Welchol for the same    C. difficile diarrhea  Patient has chronic diarrhea is associated with UC.  And he has been started on new medicine is also on the care of Dr. Krishna          Follow up in about 3 months (around 4/16/2024).

## 2024-01-16 NOTE — ASSESSMENT & PLAN NOTE
He is presently on Zocor but he has not taken it as he is taking antifungal therapy at this time.  And also start Welchol for the same

## 2024-01-16 NOTE — ASSESSMENT & PLAN NOTE
Patient feels recurrence of palpitations occurring with varying intensity and duration throughout the day.  Recommend to do the following  1. Continue on Toprol-XL 50 mg daily  2. Start back on magnesium oxide 400 mg daily  3. Obtain a event recorder  detect extent of arrhythmias if any.      4. Evaluate any correlation with palpitations and symptoms of anxiety.

## 2024-01-16 NOTE — ASSESSMENT & PLAN NOTE
He is status post ablated therapy evaluate for any recurrence of arrhythmias and breakthrough pathways

## 2024-01-16 NOTE — ASSESSMENT & PLAN NOTE
Patient has chronic diarrhea is associated with UC.  And he has been started on new medicine is also on the care of Dr. Krishna

## 2024-01-16 NOTE — ASSESSMENT & PLAN NOTE
Event recorder and is arrhythmias noted may be considering alternate therapy based on findings of a event recorder

## 2024-01-17 LAB

## 2024-01-18 ENCOUNTER — PATIENT MESSAGE (OUTPATIENT)
Dept: FAMILY MEDICINE | Facility: CLINIC | Age: 49
End: 2024-01-18
Payer: COMMERCIAL

## 2024-01-18 ENCOUNTER — HOSPITAL ENCOUNTER (OUTPATIENT)
Dept: RADIOLOGY | Facility: HOSPITAL | Age: 49
Discharge: HOME | End: 2024-01-18
Payer: COMMERCIAL

## 2024-01-18 DIAGNOSIS — Z13.6 ENCOUNTER FOR SCREENING FOR CARDIOVASCULAR DISORDERS: ICD-10-CM

## 2024-01-18 DIAGNOSIS — R07.9 CHEST PAIN, UNSPECIFIED TYPE: ICD-10-CM

## 2024-01-22 RX ORDER — METOPROLOL TARTRATE 25 MG/1
TABLET, FILM COATED ORAL
Qty: 2 TABLET | Refills: 0 | Status: SHIPPED | OUTPATIENT
Start: 2024-01-22 | End: 2024-01-29 | Stop reason: SDUPTHER

## 2024-01-22 NOTE — TELEPHONE ENCOUNTER
He will need the fast acting metoprolol.     metoprolol tartrate (LOPRESSOR) 25 MG tablet  Take 2 tablets 30 minutes prior to testing.  #2 tablets 0 refills

## 2024-01-25 ENCOUNTER — HOSPITAL ENCOUNTER (OUTPATIENT)
Dept: CARDIOLOGY | Facility: CLINIC | Age: 49
Discharge: HOME | End: 2024-01-25
Attending: INTERNAL MEDICINE
Payer: COMMERCIAL

## 2024-01-25 DIAGNOSIS — R07.9 CHEST PAIN, UNSPECIFIED TYPE: ICD-10-CM

## 2024-01-25 DIAGNOSIS — R00.2 PALPITATIONS: ICD-10-CM

## 2024-01-25 DIAGNOSIS — Z86.79 HISTORY OF SUPRAVENTRICULAR TACHYCARDIA: ICD-10-CM

## 2024-01-25 DIAGNOSIS — Z13.6 ENCOUNTER FOR SCREENING FOR CARDIOVASCULAR DISORDERS: Primary | ICD-10-CM

## 2024-01-25 PROCEDURE — 93242 EXT ECG>48HR<7D RECORDING: CPT | Mod: ,,, | Performed by: INTERNAL MEDICINE

## 2024-01-25 PROCEDURE — 93244 EXT ECG>48HR<7D REV&INTERPJ: CPT | Mod: ,,, | Performed by: INTERNAL MEDICINE

## 2024-01-29 ENCOUNTER — TELEPHONE (OUTPATIENT)
Dept: FAMILY MEDICINE | Facility: CLINIC | Age: 49
End: 2024-01-29
Payer: COMMERCIAL

## 2024-01-29 ENCOUNTER — HOSPITAL ENCOUNTER (OUTPATIENT)
Dept: RADIOLOGY | Facility: HOSPITAL | Age: 49
Discharge: HOME | End: 2024-01-29
Payer: COMMERCIAL

## 2024-01-29 DIAGNOSIS — Z13.6 ENCOUNTER FOR SCREENING FOR CARDIOVASCULAR DISORDERS: Primary | ICD-10-CM

## 2024-01-29 DIAGNOSIS — Z13.6 ENCOUNTER FOR SCREENING FOR CARDIOVASCULAR DISORDERS: ICD-10-CM

## 2024-01-29 DIAGNOSIS — R07.9 CHEST PAIN, UNSPECIFIED TYPE: ICD-10-CM

## 2024-01-29 RX ORDER — METOPROLOL TARTRATE 100 MG/1
TABLET ORAL
Qty: 1 TABLET | Refills: 0 | Status: SHIPPED | OUTPATIENT
Start: 2024-01-29 | End: 2024-01-30 | Stop reason: SDUPTHER

## 2024-01-29 NOTE — TELEPHONE ENCOUNTER
----- Message from Lissette Riley sent at 1/22/2024 10:56 AM CST -----  Regarding: CT Cardiac Scoring  Good Morning ,   Mr Jean-Baptiste was scheduled for a CT Cardiac Scoring test on 1/18/2024 @ [facility] due to to patients Heart rate the exam was cancelled on this day and test was not completed .Due to notes being added to this exam on that day it cancelled the order. Patient called in to get this exam rescheduled and we will need new orders to reschedule him .    Thank You ,  Lissette   Centralized Scheduling

## 2024-01-29 NOTE — TELEPHONE ENCOUNTER
We can try going up on the metoprolol tartrate to 100 mg 30 minutes prior to his appointment. We will probably need to reorder the coronary calcium test.

## 2024-01-30 ENCOUNTER — HOSPITAL ENCOUNTER (EMERGENCY)
Facility: HOSPITAL | Age: 49
Discharge: HOME | End: 2024-01-30
Attending: EMERGENCY MEDICINE
Payer: COMMERCIAL

## 2024-01-30 ENCOUNTER — PATIENT MESSAGE (OUTPATIENT)
Dept: PULMONOLOGY | Facility: CLINIC | Age: 49
End: 2024-01-30

## 2024-01-30 VITALS
HEART RATE: 81 BPM | DIASTOLIC BLOOD PRESSURE: 67 MMHG | TEMPERATURE: 99 F | SYSTOLIC BLOOD PRESSURE: 138 MMHG | WEIGHT: 287 LBS | RESPIRATION RATE: 21 BRPM | OXYGEN SATURATION: 97 % | BODY MASS INDEX: 47.76 KG/M2

## 2024-01-30 DIAGNOSIS — R07.89 ATYPICAL CHEST PAIN: Primary | ICD-10-CM

## 2024-01-30 DIAGNOSIS — R07.9 CHEST PAIN: ICD-10-CM

## 2024-01-30 LAB
ALBUMIN SERPL BCP-MCNC: 3.8 G/DL (ref 3.5–5.2)
ALP SERPL-CCNC: 151 U/L (ref 55–135)
ALT SERPL W/O P-5'-P-CCNC: 39 U/L (ref 10–44)
ANION GAP SERPL CALC-SCNC: 9 MMOL/L (ref 8–16)
AST SERPL-CCNC: 26 U/L (ref 10–40)
BASOPHILS # BLD AUTO: 0.09 K/UL (ref 0–0.2)
BASOPHILS NFR BLD: 0.7 % (ref 0–1.9)
BILIRUB SERPL-MCNC: 0.4 MG/DL (ref 0.1–1)
BNP SERPL-MCNC: 20 PG/ML (ref 0–99)
BUN SERPL-MCNC: 20 MG/DL (ref 6–20)
CALCIUM SERPL-MCNC: 9.3 MG/DL (ref 8.7–10.5)
CHLORIDE SERPL-SCNC: 106 MMOL/L (ref 95–110)
CO2 SERPL-SCNC: 25 MMOL/L (ref 23–29)
CREAT SERPL-MCNC: 1.1 MG/DL (ref 0.5–1.4)
DIFFERENTIAL METHOD BLD: ABNORMAL
EOSINOPHIL # BLD AUTO: 0.9 K/UL (ref 0–0.5)
EOSINOPHIL NFR BLD: 6.7 % (ref 0–8)
ERYTHROCYTE [DISTWIDTH] IN BLOOD BY AUTOMATED COUNT: 13.6 % (ref 11.5–14.5)
EST. GFR  (NO RACE VARIABLE): >60 ML/MIN/1.73 M^2
GLUCOSE SERPL-MCNC: 95 MG/DL (ref 70–110)
HCT VFR BLD AUTO: 42 % (ref 40–54)
HGB BLD-MCNC: 13.7 G/DL (ref 14–18)
IMM GRANULOCYTES # BLD AUTO: 0.05 K/UL (ref 0–0.04)
IMM GRANULOCYTES NFR BLD AUTO: 0.4 % (ref 0–0.5)
LYMPHOCYTES # BLD AUTO: 1.9 K/UL (ref 1–4.8)
LYMPHOCYTES NFR BLD: 14.7 % (ref 18–48)
MAGNESIUM SERPL-MCNC: 1.5 MG/DL (ref 1.6–2.6)
MCH RBC QN AUTO: 33.3 PG (ref 27–31)
MCHC RBC AUTO-ENTMCNC: 32.6 G/DL (ref 32–36)
MCV RBC AUTO: 102 FL (ref 82–98)
MONOCYTES # BLD AUTO: 1.1 K/UL (ref 0.3–1)
MONOCYTES NFR BLD: 8.6 % (ref 4–15)
NEUTROPHILS # BLD AUTO: 9 K/UL (ref 1.8–7.7)
NEUTROPHILS NFR BLD: 68.9 % (ref 38–73)
NRBC BLD-RTO: 0 /100 WBC
PLATELET # BLD AUTO: 286 K/UL (ref 150–450)
PMV BLD AUTO: 10.2 FL (ref 9.2–12.9)
POTASSIUM SERPL-SCNC: 4.4 MMOL/L (ref 3.5–5.1)
PROT SERPL-MCNC: 7.6 G/DL (ref 6–8.4)
RBC # BLD AUTO: 4.11 M/UL (ref 4.6–6.2)
SODIUM SERPL-SCNC: 140 MMOL/L (ref 136–145)
TROPONIN I SERPL HS-MCNC: 2.8 PG/ML (ref 0–14.9)
TROPONIN I SERPL HS-MCNC: 3.2 PG/ML (ref 0–14.9)
WBC # BLD AUTO: 13.09 K/UL (ref 3.9–12.7)

## 2024-01-30 PROCEDURE — 93010 ELECTROCARDIOGRAM REPORT: CPT | Mod: ,,, | Performed by: GENERAL PRACTICE

## 2024-01-30 PROCEDURE — 25500020 PHARM REV CODE 255: Performed by: EMERGENCY MEDICINE

## 2024-01-30 PROCEDURE — 83880 ASSAY OF NATRIURETIC PEPTIDE: CPT | Performed by: NURSE PRACTITIONER

## 2024-01-30 PROCEDURE — 84484 ASSAY OF TROPONIN QUANT: CPT | Performed by: NURSE PRACTITIONER

## 2024-01-30 PROCEDURE — 99285 EMERGENCY DEPT VISIT HI MDM: CPT | Mod: 25

## 2024-01-30 PROCEDURE — 83735 ASSAY OF MAGNESIUM: CPT | Performed by: NURSE PRACTITIONER

## 2024-01-30 PROCEDURE — 85025 COMPLETE CBC W/AUTO DIFF WBC: CPT | Performed by: NURSE PRACTITIONER

## 2024-01-30 PROCEDURE — 80053 COMPREHEN METABOLIC PANEL: CPT | Performed by: NURSE PRACTITIONER

## 2024-01-30 PROCEDURE — 93005 ELECTROCARDIOGRAM TRACING: CPT | Performed by: GENERAL PRACTICE

## 2024-01-30 RX ADMIN — IOHEXOL 100 ML: 350 INJECTION, SOLUTION INTRAVENOUS at 01:01

## 2024-01-30 NOTE — PHARMACY MED REC
"              .        Admission Medication History     The home medication history was taken by Yu Holloway.    You may go to "Admission" then "Reconcile Home Medications" tabs to review and/or act upon these items.     The home medication list has been updated by the Pharmacy department.   Please read ALL comments highlighted in yellow.   Please address this information as you see fit.    Feel free to contact us if you have any questions or require assistance.        Medications listed below were obtained from: Patient/family and Analytic software- [employer]  Current Facility-Administered Medications on File Prior to Encounter   Medication Dose Route Frequency Provider Last Rate Last Admin    lactated ringers infusion   Intravenous Continuous David Millan MD 75 mL/hr at 03/21/23 1252 New Bag at 03/21/23 1252     Current Outpatient Medications on File Prior to Encounter   Medication Sig Dispense Refill    busPIRone (BUSPAR) 5 MG Tab TAKE 1/2 TABLET (2.5 mg) daily while on voriconazole (Patient taking differently: Take 2.5 mg by mouth once daily. TAKE 1/2 TABLET (2.5 mg) daily while on voriconazole) 30 tablet 0    dicyclomine (BENTYL) 10 MG capsule Take 10 mg by mouth 3 (three) times daily as needed.      diphenhydrAMINE (BENADRYL) 25 mg capsule Take 1 capsule (25 mg total) by mouth every 6 (six) hours as needed for Itching. 30 capsule 0    LORazepam (ATIVAN) 0.5 MG tablet Take 1 tablet (0.5 mg total) by mouth every 6 (six) hours as needed for Anxiety. 60 tablet 1    metFORMIN (GLUCOPHAGE-XR) 500 MG ER 24hr tablet Take 1 tablet (500 mg total) by mouth 2 (two) times daily with meals. 180 tablet 1    metoprolol succinate (TOPROL-XL) 50 MG 24 hr tablet Take 1 tablet (50 mg total) by mouth once daily. 90 tablet 1    pantoprazole (PROTONIX) 40 MG tablet Take 1 tablet (40 mg total) by mouth 2 (two) times daily. 180 tablet 3    promethazine (PHENERGAN) 25 MG tablet Take 25 mg by mouth 4 (four) times daily as needed " for Nausea.      varenicline (CHANTIX) 1 mg Tab Take 1 tablet (1 mg total) by mouth 2 (two) times daily. 180 tablet 1    voriconazole (VFEND) 50 MG Tab Take 8 tablets (400 mg total) by mouth 2 (two) times daily. 480 tablet 0    zolpidem (AMBIEN) 10 mg Tab Take 1 tablet (10 mg total) by mouth nightly as needed (insomnia). 30 tablet 2    colesevelam (WELCHOL) 625 mg tablet Take 3 tablets (1,875 mg total) by mouth 2 (two) times daily with meals. Please separate from voriconazole 180 tablet 0    semaglutide (OZEMPIC) 1 mg/dose (4 mg/3 mL) Inject 1 mg into the skin every 7 days. 3 each 1    simvastatin (ZOCOR) 20 MG tablet Take 1 tablet (20 mg total) by mouth every evening. (Patient not taking: Reported on 1/30/2024) 90 tablet 1    [DISCONTINUED] metoprolol tartrate (LOPRESSOR) 100 MG tablet Take 1 tablet 30 minutes prior to testing. 1 tablet 0       Potential issues to be addressed PRIOR TO DISCHARGE  Patient reported not taking the following medications: (Simvastatin). These medications remain on the home medication list. Please address accordingly.     Yu Holloway  EXT [id number]

## 2024-01-30 NOTE — ED NOTES
6/10 LCW PAIN REPORTED RADIATING TO L ARM AND NECK.  NO RD. NSR AT MONITOR. IV SITE RA CLEAR. OBESE ABDOMEN.  SKIN WDI. MAEW AND AMBULATORY. CALL LIGHT IN REACH.

## 2024-01-30 NOTE — FIRST PROVIDER EVALUATION
Emergency Department TeleTriage Encounter Note      CHIEF COMPLAINT    Chief Complaint   Patient presents with    Shortness of Breath     Reports currently being treated for fungal pneumonia, diagnosed end of December and states will be on treatment for minimum of 6 months     Chest Pain     Reports worsening chest pain over past week, reports today pain radiates down left arm and into left jaw       VITAL SIGNS   Initial Vitals [01/30/24 1023]   BP Pulse Resp Temp SpO2   139/78 87 18 98.3 °F (36.8 °C) 98 %      MAP       --            ALLERGIES    Review of patient's allergies indicates:  No Known Allergies    PROVIDER TRIAGE NOTE  Verbal consent for the teletriage evaluation was given by the patient at the start of the evaluation.  All efforts will be made to maintain patient's privacy during the evaluation.      This is a teletriage evaluation of a 49 y.o. male presenting to the ED with c/o SOB for 3 weeks and CP for the last week.  Worsened today radiating down the left arm. Limited physical exam via telehealth: The patient is awake, alert, answering questions appropriately and is not in respiratory distress.  As the Teletriage provider, I performed an initial assessment and ordered appropriate labs and imaging studies, if any, to facilitate the patient's care once placed in the ED. Once a room is available, care and a full evaluation will be completed by an alternate ED provider.  Any additional orders and the final disposition will be determined by that provider.  All imaging and labs will not be followed-up by the Teletriage Team, including myself.            ORDERS  Labs Reviewed - No data to display    ED Orders (720h ago, onward)      Start Ordered     Status Ordering Provider    01/30/24 1231 01/30/24 1031  Troponin I High Sensitivity #2  Once Timed         Ordered STEVE CORCORAN    01/30/24 1032 01/30/24 1031  X-Ray Chest PA And Lateral  1 time imaging         Ordered STEVE CORCORAN    01/30/24 1031  01/30/24 1031  Magnesium  STAT         Ordered STEVE CORCORAN    01/30/24 1031 01/30/24 1031  Vital signs  Every 15 min         Ordered STEVE CORCORAN    01/30/24 1031 01/30/24 1031  Cardiac Monitoring - Adult  Continuous        Comments: Notify Physician If:    Ordered STEVE CORCORNA    01/30/24 1031 01/30/24 1031  Pulse Oximetry Continuous  Continuous         Ordered STEVE CORCORAN    01/30/24 1031 01/30/24 1031  Diet NPO  Diet effective now         Ordered STEVE CORCORAN    01/30/24 1031 01/30/24 1031  Saline lock IV  Once         Ordered STEVE CORCORAN    01/30/24 1031 01/30/24 1031  EKG 12-lead  Once        Comments: Do not perform if previously done during this visit/ triage    Ordered STEVE CORCORAN    01/30/24 1031 01/30/24 1031  CBC auto differential  STAT         Ordered STEVE CORCORAN    01/30/24 1031 01/30/24 1031  Comprehensive metabolic panel  STAT         Ordered STEVE CORCORAN    01/30/24 1031 01/30/24 1031  Troponin I High Sensitivity #1  STAT         Ordered STEVE CORCORAN    01/30/24 1031 01/30/24 1031  B-Type natriuretic peptide (BNP)  STAT         Ordered STEVE CORCORAN              Virtual Visit Note: The provider triage portion of this emergency department evaluation and documentation was performed via [employer], a HIPAA-compliant telemedicine application, in concert with a tele-presenter in the room. A face to face patient evaluation with one of my colleagues will occur once the patient is placed in an emergency department room.      DISCLAIMER: This note was prepared with [employer] voice recognition transcription software. Garbled syntax, mangled pronouns, and other bizarre constructions may be attributed to that software system.

## 2024-01-30 NOTE — ED PROVIDER NOTES
Encounter Date: 1/30/2024       History     Chief Complaint   Patient presents with    Shortness of Breath     Reports currently being treated for fungal pneumonia, diagnosed end of December and states will be on treatment for minimum of 6 months     Chest Pain     Reports worsening chest pain over past week, reports today pain radiates down left arm and into left jaw     Patient presents complaining of intermittent chest discomfort that has been ongoing for a couple of weeks.  Sometimes pain radiating to his arm.  Additionally patient recently been treated for fungal pneumonia has been compliant with that medication.  He has had some shortness of breath.  He is scheduled for a calcium score tomorrow and additionally he sees Dr. Ministerio Roman.  He does have a history of high cholesterol diabetes and high blood pressure.  He did recently stopped his anxiety medication since he started the antifungal medication because of the drug to drug interaction.  He was concerned that some of his discomfort could be anxiety related.      Review of patient's allergies indicates:  No Known Allergies  Past Medical History:   Diagnosis Date    C. difficile colitis     Cavitary pneumonia 11/2023    pos aspergillus serology    Diabetes mellitus 01/2022    Hyperlipidemia 2015    Hypertension 2015    Insomnia 2015    Ulcerative colitis      Past Surgical History:   Procedure Laterality Date    BRONCHOSCOPY WITH FLUOROSCOPY Left 11/20/2023    Procedure: BRONCHOSCOPY, WITH FLUOROSCOPY;  Surgeon: Lisa Villarreal MD;  Location: [facility] ENDO;  Service: Pulmonary;  Laterality: Left;    BRONCHOSCOPY WITH FLUOROSCOPY N/A 11/30/2023    Procedure: BRONCHOSCOPY, WITH FLUOROSCOPY;  Surgeon: Lisa Villarreal MD;  Location: [facility] ENDO;  Service: Pulmonary;  Laterality: N/A;    CARDIAC ELECTROPHYSIOLOGY MAPPING AND ABLATION  2017    SVT    CHOLECYSTECTOMY  2011    COLONOSCOPY N/A 5/3/2022    Procedure: COLONOSCOPY;  Surgeon: Shan Jean  MD REJI;  Location: [facility];  Service: Endoscopy;  Laterality: N/A;    COLONOSCOPY WITH FECAL MICROBIOTA TRANSFER N/A 3/21/2023    Procedure: COLONOSCOPY, WITH FECAL MICROBIOTA TRANSFER;  Surgeon: David Millan MD;  Location: [facility];  Service: Endoscopy;  Laterality: N/A;    ESOPHAGOGASTRODUODENOSCOPY N/A 4/24/2023    Procedure: EGD (ESOPHAGOGASTRODUODENOSCOPY);  Surgeon: hSan Jean III, MD;  Location: [facility];  Service: Endoscopy;  Laterality: N/A;    GANGLION CYST EXCISION Left 1992    UMBILICAL HERNIA REPAIR  2011    with mesh     Family History   Problem Relation Age of Onset    Asthma Mother      Social History     Tobacco Use    Smoking status: Former     Current packs/day: 1.00     Average packs/day: 1 pack/day for 31.1 years (31.1 ttl pk-yrs)     Types: Cigarettes     Start date: 1993    Smokeless tobacco: Never    Tobacco comments:     Pt states he has stopped smoking with Chantix three weeks ago. 12/1/23   Substance Use Topics    Alcohol use: Yes     Comment: ocass    Drug use: Not Currently     Review of Systems   All other systems reviewed and are negative.      Physical Exam     Initial Vitals [01/30/24 1023]   BP Pulse Resp Temp SpO2   139/78 87 18 98.3 °F (36.8 °C) 98 %      MAP       --         Physical Exam    Nursing note and vitals reviewed.  Constitutional: He appears well-developed and well-nourished. He is not diaphoretic. No distress.   Pleasant, polite.   HENT:   Head: Normocephalic and atraumatic.   Eyes: EOM are normal.   Neck: Neck supple.   Normal range of motion.  Cardiovascular:  Normal rate, regular rhythm, normal heart sounds and intact distal pulses.           Pulmonary/Chest: Breath sounds normal. No respiratory distress.   Abdominal: Abdomen is soft.   Musculoskeletal:         General: Normal range of motion.      Cervical back: Normal range of motion and neck supple.     Neurological: He is alert and oriented to person, place, and time. He has normal strength.    Skin: Skin is warm and dry.   Psychiatric: He has a normal mood and affect. His behavior is normal. Judgment and thought content normal.         ED Course   Procedures  Labs Reviewed   MAGNESIUM - Abnormal; Notable for the following components:       Result Value    Magnesium 1.5 (*)     All other components within normal limits   CBC W/ AUTO DIFFERENTIAL - Abnormal; Notable for the following components:    WBC 13.09 (*)     RBC 4.11 (*)     Hemoglobin 13.7 (*)      (*)     MCH 33.3 (*)     Gran # (ANC) 9.0 (*)     Immature Grans (Abs) 0.05 (*)     Mono # 1.1 (*)     Eos # 0.9 (*)     Lymph % 14.7 (*)     All other components within normal limits   COMPREHENSIVE METABOLIC PANEL - Abnormal; Notable for the following components:    Alkaline Phosphatase 151 (*)     All other components within normal limits   TROPONIN I HIGH SENSITIVITY   TROPONIN I HIGH SENSITIVITY   B-TYPE NATRIURETIC PEPTIDE        ECG Results              EKG 12-lead (In process)  Result time 01/30/24 10:42:49      In process by Interface, Lab In [facility] (01/30/24 10:42:49)                   Narrative:    Test Reason : R07.9,    Vent. Rate : 086 BPM     Atrial Rate : 086 BPM     P-R Int : 132 ms          QRS Dur : 074 ms      QT Int : 380 ms       P-R-T Axes : 026 041 039 degrees     QTc Int : 454 ms    Normal sinus rhythm  Normal ECG  When compared with ECG of 16-JAN-2024 15:40,  No significant change was found    Referred By: [email or web address]   SELF           Confirmed By:                                   Imaging Results              CTA Chest Non-Coronary (PE Studies) (Final result)  Result time 01/30/24 13:37:10      Final result by Consuelo Shea MD (01/30/24 13:37:10)                   Narrative:    All CT scans at this facility used dose modulation, iterative reconstruction and/or weight-based dosing when appropriate to reduce radiation doses  as low as reasonably achievable.      COMPARISON: 12/22/2023  HISTORY: Shortness of breath,  chest pain.    FINDINGS: Thin axial imaging through the chest was performed with 100 mL Omnipaque 350 IV contrast, with sagittal and coronal reformatted images and 3-D reconstructions performed on an independent workstation, with images stored in the patient's permanent electronic medical record.    This is a high-grade quality study for the evaluation of pulmonary embolism. The pulmonary arteries are normal in appearance without pulmonary emboli noted up to the subsegmental level, noting limitations of CT technique for identifying small isolated subsegmental emboli.    The heart is normal in size. There is no pericardial effusion. The aorta is normal in caliber without aneurysm or dissection.  There is no hilar, mediastinal or axillary adenopathy.    LUNGS: There is improvement of the linear airspace disease in the medial left upper lobe. There is improvement of the reticular nodular opacities in the right upper lobe and faint groundglass opacities in the lingula and left lower lobe.  There are no new infiltrates, or pulmonary nodules. There are no pleural effusions.  The esophagus is normal  Chest wall is unremarkable  Visualized portion of the upper abdomen demonstrates multiple nonobstructing left renal stones. The adrenal glands are normal. The gallbladder is absent.  There are degenerative changes of the spine    IMPRESSION: No CT evidence pulmonary emboli    Near complete resolution of the reticular nodular and groundglass opacities bilaterally with improvement of the linear alveolar opacity in the left upper lobe    Electronically signed by:  Consuelo Shea MD  01/30/2024 01:37 PM CST Workstation: [id number]                                     X-Ray Chest PA And Lateral (Final result)  Result time 01/30/24 11:00:30      Final result by Consuelo Shea MD (01/30/24 11:00:30)                   Narrative:    Reason: Chest Pain Shortness of Breath; Chest Pain    FINDINGS:  PA and lateral chest is compared  to 12/3/2023 show normal cardiomediastinal silhouette.    Lungs are clear. Pulmonary vasculature is normal. Bones are normal.    IMPRESSION:  Normal chest.    Electronically signed by:  Consuelo Shea MD  01/30/2024 11:00 AM [facility] Workstation: [id number]                                     Medications   iohexoL (OMNIPAQUE 350) injection 100 mL (100 mLs Intravenous Given 1/30/24 7904)     Medical Decision Making  Patient appears in no acute distress    Considerations include but are not limited to acute coronary syndrome, unstable angina, pulmonary embolism, pneumonia, pneumonitis, congestive heart failure, anxiety    MDM    Patient presents for emergent evaluation of acute chest pain that poses a threat to life and/or bodily function.    In the ED patient found to have acute chest pain.    I ordered labs and personally reviewed them.  Labs significant for negative high sensitivity troponin x2.  I ordered X-rays and personally reviewed them and reviewed the radiologist interpretation.  Xray significant for no acute cardiopulmonary process.    I ordered EKG and personally reviewed it.  EKG significant for regular rate and rhythm without ST elevation.    I ordered CT scan and personally reviewed it and reviewed the radiologist interpretation.  CT significant for no evidence of pulmonary embolism or pneumonia.      Discharge MDM    Patient's heart score is 3.  He does have risk factors for heart disease.  He has not having active ongoing pain.  His high sensitivity troponin is negative x2 there is no delta.  His EKGs without ST changes.  He was scheduled for a calcium score tomorrow.  Patient also be scheduled for stress test.  Patient will be enrolled in our stress test program.  Patient will be discharged in stable condition.  Patient was discharged in stable condition.  Detailed return precautions discussed.    Amount and/or Complexity of Data Reviewed  Radiology: ordered.    Risk  Prescription drug  management.      Additional MDM:   Heart Score:    History:          Slightly suspicious.  ECG:             Normal  Age:               45-65 years  Risk factors: >= 3 risk factors or history of atherosclerotic disease  Troponin:       Less than or equal to normal limit  Heart Score = 3                                       Clinical Impression:  Final diagnoses:  [R07.9] Chest pain  [R07.89] Atypical chest pain (Primary)          ED Disposition Condition    Discharge Stable          ED Prescriptions    None       Follow-up Information       Follow up With Specialties Details Why Contact Info    Hunter Aguilar III, MD Family Medicine Schedule an appointment as soon as possible for a visit in 2 days  [address]  [address]  [place] [address]  [phone]      Ministerio Roman MD Interventional Cardiology, Cardiology   [address]  [address]  [place] [address]  [phone]               Butch Broderick MD  01/30/24 1419

## 2024-01-31 ENCOUNTER — HOSPITAL ENCOUNTER (OUTPATIENT)
Dept: RADIOLOGY | Facility: HOSPITAL | Age: 49
Discharge: HOME | End: 2024-01-31
Payer: COMMERCIAL

## 2024-01-31 ENCOUNTER — TELEPHONE (OUTPATIENT)
Dept: PULMONOLOGY | Facility: HOSPITAL | Age: 49
End: 2024-01-31

## 2024-01-31 ENCOUNTER — TELEPHONE (OUTPATIENT)
Dept: CARDIOLOGY | Facility: HOSPITAL | Age: 49
End: 2024-01-31

## 2024-01-31 DIAGNOSIS — Z13.6 ENCOUNTER FOR SCREENING FOR CARDIOVASCULAR DISORDERS: ICD-10-CM

## 2024-02-01 ENCOUNTER — HOSPITAL ENCOUNTER (OUTPATIENT)
Dept: RADIOLOGY | Facility: HOSPITAL | Age: 49
Discharge: HOME | End: 2024-02-01
Attending: EMERGENCY MEDICINE
Payer: COMMERCIAL

## 2024-02-01 ENCOUNTER — CLINICAL SUPPORT (OUTPATIENT)
Dept: CARDIOLOGY | Facility: HOSPITAL | Age: 49
End: 2024-02-01
Attending: EMERGENCY MEDICINE
Payer: COMMERCIAL

## 2024-02-01 DIAGNOSIS — R07.89 ATYPICAL CHEST PAIN: ICD-10-CM

## 2024-02-01 PROCEDURE — 93016 CV STRESS TEST SUPVJ ONLY: CPT | Mod: ,,, | Performed by: INTERNAL MEDICINE

## 2024-02-01 PROCEDURE — A9502 TC99M TETROFOSMIN: HCPCS

## 2024-02-01 PROCEDURE — 93017 CV STRESS TEST TRACING ONLY: CPT

## 2024-02-01 PROCEDURE — 63600175 PHARM REV CODE 636 W HCPCS: Performed by: EMERGENCY MEDICINE

## 2024-02-01 PROCEDURE — 93018 CV STRESS TEST I&R ONLY: CPT | Mod: ,,, | Performed by: INTERNAL MEDICINE

## 2024-02-01 RX ORDER — REGADENOSON 0.08 MG/ML
0.4 INJECTION, SOLUTION INTRAVENOUS
Status: COMPLETED | OUTPATIENT
Start: 2024-02-01 | End: 2024-02-01

## 2024-02-01 RX ADMIN — REGADENOSON 0.4 MG: 0.08 INJECTION, SOLUTION INTRAVENOUS at 09:02

## 2024-02-01 NOTE — NURSING NOTE
Nuclear Lexiscan Stress Test completed without complications. Patient verbalized understanding of pre-post test instructions. Test supervision and discharge from lab per Jaki Yates NP.

## 2024-02-02 ENCOUNTER — HOSPITAL ENCOUNTER (OUTPATIENT)
Dept: RADIOLOGY | Facility: HOSPITAL | Age: 49
Discharge: HOME | End: 2024-02-02
Attending: EMERGENCY MEDICINE
Payer: COMMERCIAL

## 2024-02-08 LAB
CV PHARM DOSE: 0.4 MG
CV STRESS BASE HR: 81 BPM
DIASTOLIC BLOOD PRESSURE: 74 MMHG
OHS CV CPX 1 MINUTE RECOVERY HEART RATE: 93 BPM
OHS CV CPX 85 PERCENT MAX PREDICTED HEART RATE MALE: 145
OHS CV CPX MAX PREDICTED HEART RATE: 170
OHS CV CPX PATIENT IS FEMALE: 0
OHS CV CPX PATIENT IS MALE: 1
OHS CV CPX PEAK DIASTOLIC BLOOD PRESSURE: 74 MMHG
OHS CV CPX PEAK HEAR RATE: 96 BPM
OHS CV CPX PEAK RATE PRESSURE PRODUCT: NORMAL
OHS CV CPX PEAK SYSTOLIC BLOOD PRESSURE: 119 MMHG
OHS CV CPX PERCENT MAX PREDICTED HEART RATE ACHIEVED: 56
OHS CV CPX RATE PRESSURE PRODUCT PRESENTING: 9234
SYSTOLIC BLOOD PRESSURE: 114 MMHG

## 2024-02-09 LAB
OHS CV EVENT MONITOR DAY: 6
OHS CV HOLTER HOOKUP DATE: NORMAL
OHS CV HOLTER HOOKUP TIME: NORMAL
OHS CV HOLTER LENGTH DECIMAL HOURS: 150
OHS CV HOLTER LENGTH HOURS: 6
OHS CV HOLTER LENGTH MINUTES: 0
OHS CV HOLTER SCAN DATE: NORMAL
OHS CV HOLTER SINUS AVERAGE HR: 83 BPM
OHS CV HOLTER SINUS MAX HR: 136 BPM
OHS CV HOLTER SINUS MIN HR: 62 BPM
OHS CV HOLTER STUDY END DATE: NORMAL
OHS CV HOLTER STUDY END TIME: NORMAL
OHS QRS DURATION: 76 MS
OHS QTC CALCULATION: 440 MS

## 2024-02-12 ENCOUNTER — HOSPITAL ENCOUNTER (OUTPATIENT)
Facility: HOSPITAL | Age: 49
Discharge: HOME | End: 2024-02-13
Attending: EMERGENCY MEDICINE | Admitting: INTERNAL MEDICINE
Payer: COMMERCIAL

## 2024-02-12 DIAGNOSIS — K52.9 COLITIS: Primary | ICD-10-CM

## 2024-02-12 DIAGNOSIS — R07.9 CHEST PAIN: ICD-10-CM

## 2024-02-12 LAB
ALBUMIN SERPL BCP-MCNC: 4.2 G/DL (ref 3.5–5.2)
ALP SERPL-CCNC: 245 U/L (ref 55–135)
ALT SERPL W/O P-5'-P-CCNC: 47 U/L (ref 10–44)
ANION GAP SERPL CALC-SCNC: 12 MMOL/L (ref 8–16)
AST SERPL-CCNC: 31 U/L (ref 10–40)
BACTERIA #/AREA URNS HPF: NEGATIVE /HPF
BASOPHILS # BLD AUTO: 0.13 K/UL (ref 0–0.2)
BASOPHILS NFR BLD: 0.7 % (ref 0–1.9)
BILIRUB SERPL-MCNC: 0.5 MG/DL (ref 0.1–1)
BILIRUB UR QL STRIP: ABNORMAL
BUN SERPL-MCNC: 20 MG/DL (ref 6–20)
CALCIUM SERPL-MCNC: 9.9 MG/DL (ref 8.7–10.5)
CHLORIDE SERPL-SCNC: 101 MMOL/L (ref 95–110)
CLARITY UR: CLEAR
CO2 SERPL-SCNC: 23 MMOL/L (ref 23–29)
COLOR UR: YELLOW
CREAT SERPL-MCNC: 1.3 MG/DL (ref 0.5–1.4)
DIFFERENTIAL METHOD BLD: ABNORMAL
EOSINOPHIL # BLD AUTO: 1.3 K/UL (ref 0–0.5)
EOSINOPHIL NFR BLD: 7.1 % (ref 0–8)
ERYTHROCYTE [DISTWIDTH] IN BLOOD BY AUTOMATED COUNT: 14.4 % (ref 11.5–14.5)
EST. GFR  (NO RACE VARIABLE): >60 ML/MIN/1.73 M^2
GLUCOSE SERPL-MCNC: 70 MG/DL (ref 70–110)
GLUCOSE UR QL STRIP: NEGATIVE
HCT VFR BLD AUTO: 44.3 % (ref 40–54)
HGB BLD-MCNC: 14.3 G/DL (ref 14–18)
HGB UR QL STRIP: NEGATIVE
HYALINE CASTS #/AREA URNS LPF: 104 /LPF
IMM GRANULOCYTES # BLD AUTO: 0.18 K/UL (ref 0–0.04)
IMM GRANULOCYTES NFR BLD AUTO: 1 % (ref 0–0.5)
KETONES UR QL STRIP: ABNORMAL
LEUKOCYTE ESTERASE UR QL STRIP: ABNORMAL
LIPASE SERPL-CCNC: <3 U/L (ref 4–60)
LYMPHOCYTES # BLD AUTO: 2.5 K/UL (ref 1–4.8)
LYMPHOCYTES NFR BLD: 13.5 % (ref 18–48)
MCH RBC QN AUTO: 32.7 PG (ref 27–31)
MCHC RBC AUTO-ENTMCNC: 32.3 G/DL (ref 32–36)
MCV RBC AUTO: 101 FL (ref 82–98)
MICROSCOPIC COMMENT: ABNORMAL
MONOCYTES # BLD AUTO: 1.4 K/UL (ref 0.3–1)
MONOCYTES NFR BLD: 7.6 % (ref 4–15)
NEUTROPHILS # BLD AUTO: 13 K/UL (ref 1.8–7.7)
NEUTROPHILS NFR BLD: 70.1 % (ref 38–73)
NITRITE UR QL STRIP: NEGATIVE
NRBC BLD-RTO: 0 /100 WBC
PH UR STRIP: 6 [PH] (ref 5–8)
PLATELET # BLD AUTO: 375 K/UL (ref 150–450)
PMV BLD AUTO: 10.3 FL (ref 9.2–12.9)
POTASSIUM SERPL-SCNC: 4.5 MMOL/L (ref 3.5–5.1)
PROT SERPL-MCNC: 8.5 G/DL (ref 6–8.4)
PROT UR QL STRIP: ABNORMAL
RBC # BLD AUTO: 4.37 M/UL (ref 4.6–6.2)
RBC #/AREA URNS HPF: 3 /HPF (ref 0–4)
SODIUM SERPL-SCNC: 136 MMOL/L (ref 136–145)
SP GR UR STRIP: 1.02 (ref 1–1.03)
SQUAMOUS #/AREA URNS HPF: 5 /HPF
URN SPEC COLLECT METH UR: ABNORMAL
UROBILINOGEN UR STRIP-ACNC: ABNORMAL EU/DL
WBC # BLD AUTO: 18.5 K/UL (ref 3.9–12.7)
WBC #/AREA URNS HPF: 13 /HPF (ref 0–5)

## 2024-02-12 PROCEDURE — 83690 ASSAY OF LIPASE: CPT | Performed by: STUDENT IN AN ORGANIZED HEALTH CARE EDUCATION/TRAINING PROGRAM

## 2024-02-12 PROCEDURE — 81001 URINALYSIS AUTO W/SCOPE: CPT | Performed by: STUDENT IN AN ORGANIZED HEALTH CARE EDUCATION/TRAINING PROGRAM

## 2024-02-12 PROCEDURE — 87086 URINE CULTURE/COLONY COUNT: CPT | Performed by: STUDENT IN AN ORGANIZED HEALTH CARE EDUCATION/TRAINING PROGRAM

## 2024-02-12 PROCEDURE — 80053 COMPREHEN METABOLIC PANEL: CPT | Performed by: STUDENT IN AN ORGANIZED HEALTH CARE EDUCATION/TRAINING PROGRAM

## 2024-02-12 PROCEDURE — 85025 COMPLETE CBC W/AUTO DIFF WBC: CPT | Performed by: STUDENT IN AN ORGANIZED HEALTH CARE EDUCATION/TRAINING PROGRAM

## 2024-02-12 PROCEDURE — 51701 INSERT BLADDER CATHETER: CPT

## 2024-02-12 NOTE — FIRST PROVIDER EVALUATION
"Medical screening examination initiated.  I have conducted a focused provider triage encounter, findings are as follows:    Brief history of present illness:  h/o c-diff. Now with n/v/d    Vitals:    02/12/24 1610   BP: (!) 150/85   BP Location: Left arm   Patient Position: Sitting   Pulse: 102   Resp: 18   Temp: 98.3 °F (36.8 °C)   SpO2: 97%   Weight: 130.2 kg (287 lb)   Height: 5' 5" (1.651 m)       Pertinent physical exam:  nontoxic, well appearing    Brief workup plan:  labs, stool studies    Preliminary workup initiated; this workup will be continued and followed by the physician or advanced practice provider that is assigned to the patient when roomed.  "

## 2024-02-13 VITALS
WEIGHT: 287 LBS | HEART RATE: 96 BPM | TEMPERATURE: 99 F | RESPIRATION RATE: 16 BRPM | HEIGHT: 65 IN | DIASTOLIC BLOOD PRESSURE: 65 MMHG | OXYGEN SATURATION: 95 % | SYSTOLIC BLOOD PRESSURE: 135 MMHG | BODY MASS INDEX: 47.82 KG/M2

## 2024-02-13 LAB — LACTATE SERPL-SCNC: 0.7 MMOL/L (ref 0.5–1.9)

## 2024-02-13 PROCEDURE — 63600175 PHARM REV CODE 636 W HCPCS: Mod: JZ,JG | Performed by: EMERGENCY MEDICINE

## 2024-02-13 PROCEDURE — 25000003 PHARM REV CODE 250: Performed by: NURSE PRACTITIONER

## 2024-02-13 PROCEDURE — 83605 ASSAY OF LACTIC ACID: CPT | Performed by: EMERGENCY MEDICINE

## 2024-02-13 PROCEDURE — 86255 FLUORESCENT ANTIBODY SCREEN: CPT | Performed by: INTERNAL MEDICINE

## 2024-02-13 PROCEDURE — 96376 TX/PRO/DX INJ SAME DRUG ADON: CPT

## 2024-02-13 PROCEDURE — 83516 IMMUNOASSAY NONANTIBODY: CPT | Mod: 59 | Performed by: INTERNAL MEDICINE

## 2024-02-13 PROCEDURE — 87040 BLOOD CULTURE FOR BACTERIA: CPT | Mod: 59 | Performed by: EMERGENCY MEDICINE

## 2024-02-13 PROCEDURE — 96365 THER/PROPH/DIAG IV INF INIT: CPT

## 2024-02-13 PROCEDURE — 99285 EMERGENCY DEPT VISIT HI MDM: CPT | Mod: 25

## 2024-02-13 PROCEDURE — 63600175 PHARM REV CODE 636 W HCPCS: Performed by: INTERNAL MEDICINE

## 2024-02-13 PROCEDURE — 63600175 PHARM REV CODE 636 W HCPCS: Mod: JZ,JG | Performed by: INTERNAL MEDICINE

## 2024-02-13 PROCEDURE — 96375 TX/PRO/DX INJ NEW DRUG ADDON: CPT

## 2024-02-13 PROCEDURE — G0378 HOSPITAL OBSERVATION PER HR: HCPCS

## 2024-02-13 PROCEDURE — 96367 TX/PROPH/DG ADDL SEQ IV INF: CPT

## 2024-02-13 PROCEDURE — 25500020 PHARM REV CODE 255: Performed by: EMERGENCY MEDICINE

## 2024-02-13 PROCEDURE — 63600175 PHARM REV CODE 636 W HCPCS: Performed by: NURSE PRACTITIONER

## 2024-02-13 PROCEDURE — 96366 THER/PROPH/DIAG IV INF ADDON: CPT

## 2024-02-13 RX ORDER — SODIUM CHLORIDE 0.9 % (FLUSH) 0.9 %
2 SYRINGE (ML) INJECTION EVERY 12 HOURS PRN
Status: DISCONTINUED | OUTPATIENT
Start: 2024-02-13 | End: 2024-02-13 | Stop reason: HOSPADM

## 2024-02-13 RX ORDER — SODIUM,POTASSIUM PHOSPHATES 280-250MG
2 POWDER IN PACKET (EA) ORAL
Status: DISCONTINUED | OUTPATIENT
Start: 2024-02-13 | End: 2024-02-13 | Stop reason: HOSPADM

## 2024-02-13 RX ORDER — PROMETHAZINE HYDROCHLORIDE 25 MG/1
25 TABLET ORAL 4 TIMES DAILY PRN
Status: DISCONTINUED | OUTPATIENT
Start: 2024-02-13 | End: 2024-02-13 | Stop reason: HOSPADM

## 2024-02-13 RX ORDER — ENOXAPARIN SODIUM 100 MG/ML
40 INJECTION SUBCUTANEOUS 2 TIMES DAILY
Status: DISCONTINUED | OUTPATIENT
Start: 2024-02-13 | End: 2024-02-13 | Stop reason: HOSPADM

## 2024-02-13 RX ORDER — SODIUM CHLORIDE 9 MG/ML
INJECTION, SOLUTION INTRAVENOUS CONTINUOUS
Status: DISCONTINUED | OUTPATIENT
Start: 2024-02-13 | End: 2024-02-13 | Stop reason: HOSPADM

## 2024-02-13 RX ORDER — GLUCAGON 1 MG
1 KIT INJECTION
Status: DISCONTINUED | OUTPATIENT
Start: 2024-02-13 | End: 2024-02-13 | Stop reason: HOSPADM

## 2024-02-13 RX ORDER — INSULIN ASPART 100 [IU]/ML
0-5 INJECTION, SOLUTION INTRAVENOUS; SUBCUTANEOUS
Status: DISCONTINUED | OUTPATIENT
Start: 2024-02-13 | End: 2024-02-13 | Stop reason: HOSPADM

## 2024-02-13 RX ORDER — METRONIDAZOLE 500 MG/100ML
500 INJECTION, SOLUTION INTRAVENOUS
Status: DISCONTINUED | OUTPATIENT
Start: 2024-02-13 | End: 2024-02-13 | Stop reason: HOSPADM

## 2024-02-13 RX ORDER — METRONIDAZOLE 500 MG/100ML
500 INJECTION, SOLUTION INTRAVENOUS
Status: COMPLETED | OUTPATIENT
Start: 2024-02-13 | End: 2024-02-13

## 2024-02-13 RX ORDER — AMOXICILLIN 250 MG
1 CAPSULE ORAL DAILY PRN
Status: DISCONTINUED | OUTPATIENT
Start: 2024-02-13 | End: 2024-02-13 | Stop reason: HOSPADM

## 2024-02-13 RX ORDER — IBUPROFEN 200 MG
16 TABLET ORAL
Status: DISCONTINUED | OUTPATIENT
Start: 2024-02-13 | End: 2024-02-13 | Stop reason: HOSPADM

## 2024-02-13 RX ORDER — ACETAMINOPHEN 325 MG/1
650 TABLET ORAL EVERY 4 HOURS PRN
Status: DISCONTINUED | OUTPATIENT
Start: 2024-02-13 | End: 2024-02-13 | Stop reason: HOSPADM

## 2024-02-13 RX ORDER — CIPROFLOXACIN 2 MG/ML
400 INJECTION, SOLUTION INTRAVENOUS
Status: DISCONTINUED | OUTPATIENT
Start: 2024-02-13 | End: 2024-02-13 | Stop reason: HOSPADM

## 2024-02-13 RX ORDER — IPRATROPIUM BROMIDE AND ALBUTEROL SULFATE 2.5; .5 MG/3ML; MG/3ML
3 SOLUTION RESPIRATORY (INHALATION) EVERY 6 HOURS PRN
Status: DISCONTINUED | OUTPATIENT
Start: 2024-02-13 | End: 2024-02-13 | Stop reason: HOSPADM

## 2024-02-13 RX ORDER — ZOLPIDEM TARTRATE 5 MG/1
10 TABLET ORAL NIGHTLY PRN
Status: DISCONTINUED | OUTPATIENT
Start: 2024-02-13 | End: 2024-02-13 | Stop reason: HOSPADM

## 2024-02-13 RX ORDER — HYDROCODONE BITARTRATE AND ACETAMINOPHEN 5; 325 MG/1; MG/1
1 TABLET ORAL EVERY 6 HOURS PRN
Status: DISCONTINUED | OUTPATIENT
Start: 2024-02-13 | End: 2024-02-13 | Stop reason: HOSPADM

## 2024-02-13 RX ORDER — LANOLIN ALCOHOL/MO/W.PET/CERES
800 CREAM (GRAM) TOPICAL
Status: DISCONTINUED | OUTPATIENT
Start: 2024-02-13 | End: 2024-02-13 | Stop reason: HOSPADM

## 2024-02-13 RX ORDER — LEVOFLOXACIN 5 MG/ML
500 INJECTION, SOLUTION INTRAVENOUS ONCE
Qty: 100 ML | Refills: 0 | Status: SHIPPED | OUTPATIENT
Start: 2024-02-13 | End: 2024-02-13 | Stop reason: HOSPADM

## 2024-02-13 RX ORDER — ALUMINUM HYDROXIDE, MAGNESIUM HYDROXIDE, AND SIMETHICONE 1200; 120; 1200 MG/30ML; MG/30ML; MG/30ML
30 SUSPENSION ORAL 4 TIMES DAILY PRN
Status: DISCONTINUED | OUTPATIENT
Start: 2024-02-13 | End: 2024-02-13 | Stop reason: HOSPADM

## 2024-02-13 RX ORDER — PREDNISONE 20 MG/1
40 TABLET ORAL DAILY
Qty: 60 TABLET | Refills: 0 | Status: ON HOLD | OUTPATIENT
Start: 2024-02-13 | End: 2024-03-16 | Stop reason: HOSPADM

## 2024-02-13 RX ORDER — METOPROLOL SUCCINATE 25 MG/1
50 TABLET, EXTENDED RELEASE ORAL DAILY
Status: DISCONTINUED | OUTPATIENT
Start: 2024-02-13 | End: 2024-02-13 | Stop reason: HOSPADM

## 2024-02-13 RX ORDER — DICYCLOMINE HYDROCHLORIDE 10 MG/1
10 CAPSULE ORAL 3 TIMES DAILY PRN
Status: DISCONTINUED | OUTPATIENT
Start: 2024-02-13 | End: 2024-02-13 | Stop reason: HOSPADM

## 2024-02-13 RX ORDER — NALOXONE HCL 0.4 MG/ML
0.02 VIAL (ML) INJECTION
Status: DISCONTINUED | OUTPATIENT
Start: 2024-02-13 | End: 2024-02-13 | Stop reason: HOSPADM

## 2024-02-13 RX ORDER — IBUPROFEN 200 MG
24 TABLET ORAL
Status: DISCONTINUED | OUTPATIENT
Start: 2024-02-13 | End: 2024-02-13 | Stop reason: HOSPADM

## 2024-02-13 RX ADMIN — METOPROLOL SUCCINATE 50 MG: 25 TABLET, EXTENDED RELEASE ORAL at 11:02

## 2024-02-13 RX ADMIN — METHYLPREDNISOLONE SODIUM SUCCINATE 80 MG: 40 INJECTION, POWDER, FOR SOLUTION INTRAMUSCULAR; INTRAVENOUS at 12:02

## 2024-02-13 RX ADMIN — METRONIDAZOLE 500 MG: 5 INJECTION, SOLUTION INTRAVENOUS at 06:02

## 2024-02-13 RX ADMIN — ACETAMINOPHEN 650 MG: 325 TABLET ORAL at 11:02

## 2024-02-13 RX ADMIN — SODIUM CHLORIDE: 9 INJECTION, SOLUTION INTRAVENOUS at 09:02

## 2024-02-13 RX ADMIN — METRONIDAZOLE 500 MG: 5 INJECTION, SOLUTION INTRAVENOUS at 04:02

## 2024-02-13 RX ADMIN — IOHEXOL 100 ML: 350 INJECTION, SOLUTION INTRAVENOUS at 02:02

## 2024-02-13 RX ADMIN — BUSPIRONE HYDROCHLORIDE 2.5 MG: 5 TABLET ORAL at 04:02

## 2024-02-13 RX ADMIN — CIPROFLOXACIN 400 MG: 400 INJECTION, SOLUTION INTRAVENOUS at 10:02

## 2024-02-13 NOTE — CONSULTS
GASTROENTEROLOGY INPATIENT CONSULT NOTE  Patient Name: Jacoby Jean-Baptiste  Patient MRN: [id number]  Patient : 1975    Admit Date: 2024  Service date: 2024    Reason for Consult:  Recurrent colitis    PCP: Hunter Aguilar III, MD    Chief Complaint   Patient presents with    Nausea    Vomiting    Diarrhea      X 1 week, states the same symptoms as last year when he had cdiff       HPI: Patient is a 49 y.o. male with PMHx  hypertension diabetes on Ozempic hypertension hyperlipidemia sleep apnea umbilical hernia repair and ulcerative colitis.  Patient was diagnosed with ulcerative colitis in  he was started on Remicade.  Two thousand twenty-three he had a prolonged course with recurrent Clostridium difficile which ultimately required vancomycin and re Bayada enemas and fecal transplant.  Due to bilateral pneumonia on Remicade patient had to be switched to Entyvio and he is status post 2nd week of Entyvio obtained 2020 for any scheduled for  for the last of his induction dosing.  Patient had severe diarrhea anorexia and nausea and it got really bad 2 days ago he presented to the emergency room for evaluation and concern that his Clostridium difficile is back.  He has not had a bowel movement since admission in the ER and has not eaten as of yet.     Past Medical History:  Past Medical History:   Diagnosis Date    C. difficile colitis     Cavitary pneumonia 2023    pos aspergillus serology    Diabetes mellitus 2022    Hyperlipidemia     Hypertension     Insomnia     Ulcerative colitis         Past Surgical History:  Past Surgical History:   Procedure Laterality Date    BRONCHOSCOPY WITH FLUOROSCOPY Left 2023    Procedure: BRONCHOSCOPY, WITH FLUOROSCOPY;  Surgeon: Lisa Villarreal MD;  Location: [facility];  Service: Pulmonary;  Laterality: Left;    BRONCHOSCOPY WITH FLUOROSCOPY N/A 2023    Procedure: BRONCHOSCOPY, WITH FLUOROSCOPY;  Surgeon:  Lisa Villarreal MD;  Location: [facility];  Service: Pulmonary;  Laterality: N/A;    CARDIAC ELECTROPHYSIOLOGY MAPPING AND ABLATION  2017    SVT    CHOLECYSTECTOMY  2011    COLONOSCOPY N/A 5/3/2022    Procedure: COLONOSCOPY;  Surgeon: Shan Jean III, MD;  Location: [facility];  Service: Endoscopy;  Laterality: N/A;    COLONOSCOPY WITH FECAL MICROBIOTA TRANSFER N/A 3/21/2023    Procedure: COLONOSCOPY, WITH FECAL MICROBIOTA TRANSFER;  Surgeon: David Millan MD;  Location: [facility] ENDO;  Service: Endoscopy;  Laterality: N/A;    ESOPHAGOGASTRODUODENOSCOPY N/A 4/24/2023    Procedure: EGD (ESOPHAGOGASTRODUODENOSCOPY);  Surgeon: Shan Jean III, MD;  Location: [facility];  Service: Endoscopy;  Laterality: N/A;    GANGLION CYST EXCISION Left 1992    UMBILICAL HERNIA REPAIR  2011    with mesh        Home Medications:  (Not in a hospital admission)      Inpatient Medications:   ciprofloxacin  400 mg Intravenous Q24H    metronidazole  500 mg Intravenous Q8H         Review of patient's allergies indicates:  No Known Allergies    Social History:   Social History     Occupational History    Not on file   Tobacco Use    Smoking status: Former     Current packs/day: 1.00     Average packs/day: 1 pack/day for 31.1 years (31.1 ttl pk-yrs)     Types: Cigarettes     Start date: 1993    Smokeless tobacco: Never    Tobacco comments:     Pt states he has stopped smoking with Chantix three weeks ago. 12/1/23   Substance and Sexual Activity    Alcohol use: Yes     Comment: ocass    Drug use: Not Currently    Sexual activity: Not Currently       Family History:   Family History   Problem Relation Age of Onset    Asthma Mother        Review of Systems:  A 10 point review of systems was performed and was normal, except as mentioned in the HPI, including constitutional, HEENT, heme, lymph, cardiovascular, respiratory, gastrointestinal, genitourinary, neurologic, endocrine, psychiatric and  "musculoskeletal.      OBJECTIVE:    Physical Exam:  24 Hour Vital Sign Ranges: Temp:  [98.3 °F (36.8 °C)] 98.3 °F (36.8 °C)  Pulse:  [] 95  Resp:  [18] 18  SpO2:  [96 %-99 %] 97 %  BP: (111-150)/(53-88) 111/53  Most recent vitals: BP (!) 111/53   Pulse 95   Temp 98.3 °F (36.8 °C)   Resp 18   Ht 5' 5" (1.651 m)   Wt 130.2 kg (287 lb)   SpO2 97%   BMI 47.76 kg/m²    GEN: well-developed, well-nourished, awake and alert, non-toxic appearing adult  HEENT: PERRL, sclera anicteric, oral mucosa pink and moist without lesion  NECK: trachea midline; Good ROM  CV: regular rate and rhythm, no murmurs or gallops  RESP: clear to auscultation bilaterally, no wheezes, rhonci or rales  ABD: soft, non-tender, non-distended, normal bowel sounds  EXT: no swelling or edema, 2+ pulses distally  SKIN: no rashes or jaundice  PSYCH: normal affect    Labs:   Recent Labs     02/12/24  1628   WBC 18.50*   *        Recent Labs     02/12/24  1628      K 4.5      CO2 23   BUN 20   GLU 70     No results for input(s): "ALB" in the last 72 hours.    Invalid input(s): "ALKP", "SGOT", "SGPT", "TBIL", "DBIL", "TPRO"  No results for input(s): "PT", "INR", "PTT" in the last 72 hours.      Radiology Review:  CT Abdomen Pelvis With IV Contrast NO Oral Contrast   Final Result        IMPRESSION:  Acute colitis of the distal transverse colon through rectum.    IMPRESSION / RECOMMENDATIONS:  49-year-old male with a history of diabetes hypertension sleep apnea recurrent C diff and ulcerative colitis.  Remicade had to be discontinued due to bilateral cavitary pneumonia and he is in the midst of his induction with Entyvio.  His last dose of steroids was before Entyvio was initiated.    Recommend Solu-Medrol IV to reinitiate.  Check fecal calprotectin.  Check stool for C diff which is still awaiting a stool sample and advance diet.  If patient is negative for C diff and tolerating diet okay to discharge home on prolonged " steroid taper and would recommend q.4 dosing of his Entyvio    Thank you for this consult.    Odalis Mccabe  2/13/2024  10:52 AM

## 2024-02-13 NOTE — SUBJECTIVE & OBJECTIVE
Past Medical History:   Diagnosis Date    C. difficile colitis     Cavitary pneumonia 11/2023    pos aspergillus serology    Diabetes mellitus 01/2022    Hyperlipidemia 2015    Hypertension 2015    Insomnia 2015    Ulcerative colitis        Past Surgical History:   Procedure Laterality Date    BRONCHOSCOPY WITH FLUOROSCOPY Left 11/20/2023    Procedure: BRONCHOSCOPY, WITH FLUOROSCOPY;  Surgeon: Lisa Villarreal MD;  Location: [facility] ENDO;  Service: Pulmonary;  Laterality: Left;    BRONCHOSCOPY WITH FLUOROSCOPY N/A 11/30/2023    Procedure: BRONCHOSCOPY, WITH FLUOROSCOPY;  Surgeon: Lisa Villarreal MD;  Location: [facility] ENDO;  Service: Pulmonary;  Laterality: N/A;    CARDIAC ELECTROPHYSIOLOGY MAPPING AND ABLATION  2017    SVT    CHOLECYSTECTOMY  2011    COLONOSCOPY N/A 5/3/2022    Procedure: COLONOSCOPY;  Surgeon: Shan Jean III, MD;  Location: [facility];  Service: Endoscopy;  Laterality: N/A;    COLONOSCOPY WITH FECAL MICROBIOTA TRANSFER N/A 3/21/2023    Procedure: COLONOSCOPY, WITH FECAL MICROBIOTA TRANSFER;  Surgeon: David Millan MD;  Location: [facility];  Service: Endoscopy;  Laterality: N/A;    ESOPHAGOGASTRODUODENOSCOPY N/A 4/24/2023    Procedure: EGD (ESOPHAGOGASTRODUODENOSCOPY);  Surgeon: Shan Jean III, MD;  Location: [facility];  Service: Endoscopy;  Laterality: N/A;    GANGLION CYST EXCISION Left 1992    UMBILICAL HERNIA REPAIR  2011    with mesh       Review of patient's allergies indicates:  No Known Allergies    Current Facility-Administered Medications on File Prior to Encounter   Medication    lactated ringers infusion     Current Outpatient Medications on File Prior to Encounter   Medication Sig    busPIRone (BUSPAR) 5 MG Tab TAKE 1/2 TABLET (2.5 mg) daily while on voriconazole (Patient taking differently: Take 2.5 mg by mouth once daily. TAKE 1/2 TABLET (2.5 mg) daily while on voriconazole)    colesevelam (WELCHOL) 625 mg tablet Take 3 tablets (1,875 mg total) by mouth 2 (two)  times daily with meals. Please separate from voriconazole    dicyclomine (BENTYL) 10 MG capsule Take 10 mg by mouth 3 (three) times daily as needed.    LORazepam (ATIVAN) 0.5 MG tablet Take 1 tablet (0.5 mg total) by mouth every 6 (six) hours as needed for Anxiety.    metFORMIN (GLUCOPHAGE-XR) 500 MG ER 24hr tablet Take 1 tablet (500 mg total) by mouth 2 (two) times daily with meals.    metoprolol succinate (TOPROL-XL) 50 MG 24 hr tablet Take 1 tablet (50 mg total) by mouth once daily.    pantoprazole (PROTONIX) 40 MG tablet Take 1 tablet (40 mg total) by mouth 2 (two) times daily.    semaglutide (OZEMPIC) 1 mg/dose (4 mg/3 mL) Inject 1 mg into the skin every 7 days.    varenicline (CHANTIX) 1 mg Tab Take 1 tablet (1 mg total) by mouth 2 (two) times daily.    zolpidem (AMBIEN) 10 mg Tab Take 1 tablet (10 mg total) by mouth nightly as needed (insomnia).    diphenhydrAMINE (BENADRYL) 25 mg capsule Take 1 capsule (25 mg total) by mouth every 6 (six) hours as needed for Itching.    promethazine (PHENERGAN) 25 MG tablet Take 25 mg by mouth 4 (four) times daily as needed for Nausea.    [DISCONTINUED] simvastatin (ZOCOR) 20 MG tablet Take 1 tablet (20 mg total) by mouth every evening. (Patient not taking: Reported on 1/30/2024)     Family History       Problem Relation (Age of Onset)    Asthma Mother          Tobacco Use    Smoking status: Former     Current packs/day: 1.00     Average packs/day: 1 pack/day for 31.1 years (31.1 ttl pk-yrs)     Types: Cigarettes     Start date: 1993    Smokeless tobacco: Never    Tobacco comments:     Pt states he has stopped smoking with Chantix three weeks ago. 12/1/23   Substance and Sexual Activity    Alcohol use: Yes     Comment: ocass    Drug use: Not Currently    Sexual activity: Not Currently     Review of Systems   Constitutional:  Positive for activity change, appetite change and fatigue.   Gastrointestinal:  Positive for abdominal pain, diarrhea, nausea and vomiting. Negative for  blood in stool.     Objective:     Vital Signs (Most Recent):  Temp: 98.3 °F (36.8 °C) (02/12/24 1610)  Pulse: 98 (02/13/24 1052)  Resp: 18 (02/12/24 1610)  BP: 132/66 (02/13/24 1052)  SpO2: 97 % (02/13/24 1052) Vital Signs (24h Range):  Temp:  [98.3 °F (36.8 °C)] 98.3 °F (36.8 °C)  Pulse:  [] 98  Resp:  [18] 18  SpO2:  [96 %-99 %] 97 %  BP: (111-150)/(53-88) 132/66     Weight: 130.2 kg (287 lb)  Body mass index is 47.76 kg/m².     Physical Exam  Constitutional:       Appearance: Normal appearance.   Cardiovascular:      Rate and Rhythm: Normal rate and regular rhythm.   Pulmonary:      Effort: Pulmonary effort is normal. No respiratory distress.   Abdominal:      General: There is no distension.      Tenderness: There is no abdominal tenderness.   Neurological:      Mental Status: He is alert.                Significant Labs: All pertinent labs within the past 24 hours have been reviewed.  BMP:   Recent Labs   Lab 02/12/24  1628   GLU 70      K 4.5      CO2 23   BUN 20   CREATININE 1.3   CALCIUM 9.9     CBC:   Recent Labs   Lab 02/12/24  1628   WBC 18.50*   HGB 14.3   HCT 44.3          Significant Imaging: I have reviewed all pertinent imaging results/findings within the past 24 hours.

## 2024-02-13 NOTE — HPI
Jacoby Jean-Baptiste is a 49 year old female with PMH of recurrent colitis, hypertension, DM tupe 2, and hyperlipidemia who presents to the ED with nausea, vomiting, and worsening diarrhea x 2 days. He reports no appetite therefore N/V/D have subsided. He reports chronic diarrhea and previously was on Remicaid which was discontinued due to atypical pneumonia. He has medications at home for diarrhea and abdominal cramping however did not take for symptom relief. Denies abdominal pain-only occurs after eating. He received IV cipro and flagyl in ED. He is followed by Dr. Jena, GI. Labs reviewed.- CBC -WBC 18.5. BMP Cr WNL         Secondary Defect Length In Cm (Required For Flaps): 0

## 2024-02-13 NOTE — ASSESSMENT & PLAN NOTE
Stool studies pending. No N/V/D since Sunday. Poor appetite. Cipro and flagyl initiated. GI consulted. IV hydration

## 2024-02-13 NOTE — PROGRESS NOTES
Pharmacist Renal Dose Adjustment Note    Jacoby Jean-Baptiste is a 49 y.o. male being treated with the medication Enoxaparin.    Patient Data:    Vital Signs (Most Recent):  Temp: 98.3 °F (36.8 °C) (02/12/24 1610)  Pulse: 98 (02/13/24 1052)  Resp: 18 (02/12/24 1610)  BP: 132/66 (02/13/24 1052)  SpO2: 97 % (02/13/24 1052) Vital Signs (72h Range):  Temp:  [98.3 °F (36.8 °C)]   Pulse:  []   Resp:  [18]   BP: (111-150)/(53-88)   SpO2:  [96 %-99 %]      Recent Labs   Lab 02/12/24  1628   CREATININE 1.3     Serum creatinine: 1.3 mg/dL 02/12/24 1628  Estimated creatinine clearance: 85.6 mL/min    Enoxaparin 40 mg Q24H will be changed to Enoxaparin 40 mg BID per renal dose adjustment policy.    Pharmacist's Name: Wade Bautista  Pharmacist's Extension: [phone]

## 2024-02-13 NOTE — ASSESSMENT & PLAN NOTE
"Patient's FSGs are controlled on current medication regimen.  Last A1c reviewed-   Lab Results   Component Value Date    HGBA1C 5.2 10/27/2023     Most recent fingerstick glucose reviewed- No results for input(s): "POCTGLUCOSE" in the last 24 hours.  Current correctional scale  Low  Maintain anti-hyperglycemic dose as follows-   Antihyperglycemics (From admission, onward)      None          Hold Oral hypoglycemics while patient is in the hospital.  "

## 2024-02-13 NOTE — ASSESSMENT & PLAN NOTE
Patient is chronically on notstatin.will not continue for now. Monitor clinically. Last LDL was   Lab Results   Component Value Date    LDLCALC 81.4 10/27/2023

## 2024-02-13 NOTE — ED PROVIDER NOTES
Encounter Date: 2/12/2024       History     Chief Complaint   Patient presents with    Nausea    Vomiting    Diarrhea      X 1 week, states the same symptoms as last year when he had cdiff     Chief complaint is concerned for having C diff infection.  The patient has a long started tell but basically January through March of last year he had such severe C diff that he had to have a fecal transplant that was in March then April he had an ulcerative colitis attack then he was started on infusions for the in July he had C diff of last year September he had a special medicine treatment with an enema.  Then November he had a fungal infection of his lung because the medicine he took in September made his immune system weak.  He was seen at 1.4 heart rate abnormality and for the last 14 days now he has had what he thinks is C diff symptoms but diarrhea.  He has no stool to show me or give me today.  He has a patient of the GI doctor Dr. Jean.        Review of patient's allergies indicates:  No Known Allergies  Past Medical History:   Diagnosis Date    C. difficile colitis     Cavitary pneumonia 11/2023    pos aspergillus serology    Diabetes mellitus 01/2022    Hyperlipidemia 2015    Hypertension 2015    Insomnia 2015    Ulcerative colitis      Past Surgical History:   Procedure Laterality Date    BRONCHOSCOPY WITH FLUOROSCOPY Left 11/20/2023    Procedure: BRONCHOSCOPY, WITH FLUOROSCOPY;  Surgeon: Lisa Villarreal MD;  Location: [facility];  Service: Pulmonary;  Laterality: Left;    BRONCHOSCOPY WITH FLUOROSCOPY N/A 11/30/2023    Procedure: BRONCHOSCOPY, WITH FLUOROSCOPY;  Surgeon: Lisa Villarreal MD;  Location: [facility];  Service: Pulmonary;  Laterality: N/A;    CARDIAC ELECTROPHYSIOLOGY MAPPING AND ABLATION  2017    SVT    CHOLECYSTECTOMY  2011    COLONOSCOPY N/A 5/3/2022    Procedure: COLONOSCOPY;  Surgeon: Shan Jean III, MD;  Location: [facility];  Service: Endoscopy;  Laterality: N/A;    COLONOSCOPY  WITH FECAL MICROBIOTA TRANSFER N/A 3/21/2023    Procedure: COLONOSCOPY, WITH FECAL MICROBIOTA TRANSFER;  Surgeon: David Millan MD;  Location: [facility];  Service: Endoscopy;  Laterality: N/A;    ESOPHAGOGASTRODUODENOSCOPY N/A 4/24/2023    Procedure: EGD (ESOPHAGOGASTRODUODENOSCOPY);  Surgeon: Shan Jean III, MD;  [facility] ENDO;  Service: Endoscopy;  Laterality: N/A;    GANGLION CYST EXCISION Left 1992    UMBILICAL HERNIA REPAIR  2011    with mesh     Family History   Problem Relation Age of Onset    Asthma Mother      Social History     Tobacco Use    Smoking status: Former     Current packs/day: 1.00     Average packs/day: 1 pack/day for 31.1 years (31.1 ttl pk-yrs)     Types: Cigarettes     Start date: 1993    Smokeless tobacco: Never    Tobacco comments:     Pt states he has stopped smoking with Chantix three weeks ago. 12/1/23   Substance Use Topics    Alcohol use: Yes     Comment: ocass    Drug use: Not Currently     Review of Systems   Constitutional:  Negative for chills and fever.   HENT:  Negative for ear pain, rhinorrhea and sore throat.    Eyes:  Negative for pain and visual disturbance.   Respiratory:  Negative for cough and shortness of breath.    Cardiovascular:  Negative for chest pain and palpitations.   Gastrointestinal:  Positive for diarrhea, nausea and vomiting. Negative for abdominal pain and constipation.   Genitourinary:  Negative for dysuria, frequency, hematuria and urgency.   Musculoskeletal:  Negative for back pain, joint swelling and myalgias.   Skin:  Negative for rash.   Neurological:  Negative for dizziness, seizures, weakness and headaches.   Psychiatric/Behavioral:  Negative for dysphoric mood. The patient is not nervous/anxious.        Physical Exam     Initial Vitals [02/12/24 1610]   BP Pulse Resp Temp SpO2   (!) 150/85 102 18 98.3 °F (36.8 °C) 97 %      MAP       --         Physical Exam    Nursing note and vitals reviewed.  Constitutional: He appears  well-developed and well-nourished.   HENT:   Head: Normocephalic and atraumatic.   Eyes: Conjunctivae, EOM and lids are normal. Pupils are equal, round, and reactive to light.   Neck: Trachea normal. Neck supple. No thyroid mass present.   Cardiovascular:  Normal rate, regular rhythm and normal heart sounds.           Pulmonary/Chest: Breath sounds normal. No respiratory distress.   Abdominal: Abdomen is soft. Bowel sounds are normal. He exhibits no distension. There is no rebound.   Musculoskeletal:         General: Normal range of motion.      Cervical back: Neck supple.     Neurological: He is alert and oriented to person, place, and time. He has normal strength and normal reflexes. No cranial nerve deficit or sensory deficit.   Skin: Skin is warm and dry.   Psychiatric: He has a normal mood and affect. His speech is normal and behavior is normal. Judgment and thought content normal.         ED Course   Procedures  Labs Reviewed   CBC W/ AUTO DIFFERENTIAL - Abnormal; Notable for the following components:       Result Value    WBC 18.50 (*)     RBC 4.37 (*)      (*)     MCH 32.7 (*)     Immature Granulocytes 1.0 (*)     Gran # (ANC) 13.0 (*)     Immature Grans (Abs) 0.18 (*)     Mono # 1.4 (*)     Eos # 1.3 (*)     Lymph % 13.5 (*)     All other components within normal limits   COMPREHENSIVE METABOLIC PANEL - Abnormal; Notable for the following components:    Total Protein 8.5 (*)     Alkaline Phosphatase 245 (*)     ALT 47 (*)     All other components within normal limits   URINALYSIS, REFLEX TO URINE CULTURE - Abnormal; Notable for the following components:    Protein, UA 1+ (*)     Ketones, UA 2+ (*)     Bilirubin (UA) 1+ (*)     Urobilinogen, UA 2.0-3.0 (*)     Leukocytes, UA 1+ (*)     All other components within normal limits    Narrative:     In and Out Cath as needed it patient unable to void  Specimen Source->Urine   LIPASE - Abnormal; Notable for the following components:    Lipase <3 (*)     All  other components within normal limits   URINALYSIS MICROSCOPIC - Abnormal; Notable for the following components:    WBC, UA 13 (*)     Hyaline Casts,  (*)     All other components within normal limits    Narrative:     In and Out Cath as needed it patient unable to void  Specimen Source->Urine   CULTURE, STOOL   CLOSTRIDIUM DIFFICILE   CULTURE, URINE   STOOL EXAM-OVA,CYSTS,PARASITES   WBC, STOOL   OCCULT BLOOD X 1, STOOL          Imaging Results              CT Abdomen Pelvis With IV Contrast NO Oral Contrast (Final result)  Result time 02/13/24 03:12:00      Final result by Shy Vallecillo MD (02/13/24 03:12:00)                   Narrative:    EXAM:  CT Abdomen and Pelvis With Intravenous Contrast    CLINICAL HISTORY:  The patient is 49 years old and is Male; diarrhea    TECHNIQUE:  Axial computed tomography images of the abdomen and pelvis with intravenous contrast.  Sagittal and coronal reformatted images were created and reviewed.  This CT exam was performed using one or more of the following dose reduction techniques:  automated exposure control, adjustment of the mA and/or kV according to patient size, and/or use of iterative reconstruction technique.    COMPARISON:  CT Abdomen Pelvis dated 11/28/2023    FINDINGS:  LUNG BASES:  Mild linear atelectasis versus scarring in the lingula.  No focal consolidation.    ABDOMEN:  LIVER:  Fatty infiltration of the liver with associated hepatomegaly.  GALLBLADDER AND BILE DUCTS: Prior cholecystectomy. No significant ductal dilatation.  PANCREAS:  Unremarkable.  No mass.  No ductal dilation.  SPLEEN:  Unchanged mild splenomegaly.  ADRENALS:  Unremarkable.  No mass.  KIDNEYS AND URETERS:  Bilateral nonobstructing renal stones. No hydronephrosis or obstructing urinary tract calculus.  STOMACH AND BOWEL:  Duodenal diverticulum incidentally noted.  Wall thickening of the distal transverse colon through the rectum with pericolonic fat infiltration.  No  obstruction.    PELVIS:  APPENDIX:  Normal appendix nonvisualized. No findings to suggest acute appendicitis.  BLADDER:  Unremarkable.  No mass.  REPRODUCTIVE:  Unremarkable as visualized.    ABDOMEN and PELVIS:  INTRAPERITONEAL SPACE:  Unremarkable.  No free air.  No significant fluid collection.  BONES/JOINTS:  Degenerative changes.  No acute fracture.  No dislocation.  SOFT TISSUES:  Unremarkable.  VASCULATURE:  Atherosclerotic disease.  No abdominal aortic aneurysm.  LYMPH NODES:  Nonspecific prominent periportal lymph node similar to prior exam. No additional mesenteric or retroperitoneal lymphadenopathy.    IMPRESSION:  Acute colitis of the distal transverse colon through rectum.    Additional chronic/incidental findings as above.    Electronically signed by:  Shy Vallecillo MD  02/13/2024 03:12 AM CST Workstation: [id number]                                     Medications   iohexoL (OMNIPAQUE 350) injection 100 mL (100 mLs Intravenous Given 2/13/24 0227)     Medical Decision Making  Patient with history of C diff with nausea vomiting and diarrhea for few weeks.  CT scan here shows colitis.  Patient is taking an antifungal I will make sure that antibiotics will not interact with that as we put him in the hospital for severe colitis.    Amount and/or Complexity of Data Reviewed  Radiology: ordered.    Risk  Prescription drug management.                                      Clinical Impression:  Final diagnoses:  [K52.9] Colitis (Primary)          ED Disposition Condition    Observation Stable                Bacilio Rodriguez MD  02/13/24 [id number]

## 2024-02-13 NOTE — ASSESSMENT & PLAN NOTE
Body mass index is 47.76 kg/m². Morbid obesity complicates all aspects of disease management from diagnostic modalities to treatment. Weight loss encouraged and health benefits explained to patient.

## 2024-02-13 NOTE — H&P
[facility] - Emergency Dept  Hospital Medicine  History & Physical    Patient Name: Jacoby Jean-Baptiste  MRN: [id number]  Patient Class: OP- Observation  Admission Date: 2/12/2024  Attending Physician: Jessica Matamoros NP  Primary Care Provider: Hunter Aguilar III, MD         Patient information was obtained from patient and ER records.     Subjective:     Principal Problem:<principal problem not specified>    Chief Complaint:   Chief Complaint   Patient presents with    Nausea    Vomiting    Diarrhea      X 1 week, states the same symptoms as last year when he had cdiff        HPI: Jacoby Jean-Baptiste is a 49 year old female with PMH of recurrent colitis, hypertension, DM tupe 2, and hyperlipidemia who presents to the ED with nausea, vomiting, and worsening diarrhea x 2 days. He reports no appetite therefore N/V/D have subsided. He reports chronic diarrhea and previously was on Remicaid which was discontinued due to atypical pneumonia. He has medications at home for diarrhea and abdominal cramping however did not take for symptom relief. Denies abdominal pain-only occurs after eating. He received IV cipro and flagyl in ED. He is followed by Dr. Jean, GI. Labs reviewed.- CBC -WBC 18.5. BMP Cr WNL          Past Medical History:   Diagnosis Date    C. difficile colitis     Cavitary pneumonia 11/2023    pos aspergillus serology    Diabetes mellitus 01/2022    Hyperlipidemia 2015    Hypertension 2015    Insomnia 2015    Ulcerative colitis        Past Surgical History:   Procedure Laterality Date    BRONCHOSCOPY WITH FLUOROSCOPY Left 11/20/2023    Procedure: BRONCHOSCOPY, WITH FLUOROSCOPY;  Surgeon: Lisa Villarreal MD;  Location: [facility] ENDO;  Service: Pulmonary;  Laterality: Left;    BRONCHOSCOPY WITH FLUOROSCOPY N/A 11/30/2023    Procedure: BRONCHOSCOPY, WITH FLUOROSCOPY;  Surgeon: Lisa Villarreal MD;  Location: [facility] ENDO;  Service: Pulmonary;  Laterality: N/A;    CARDIAC ELECTROPHYSIOLOGY  MAPPING AND ABLATION  2017    SVT    CHOLECYSTECTOMY  2011    COLONOSCOPY N/A 5/3/2022    Procedure: COLONOSCOPY;  Surgeon: Shan Jean III, MD;  Location: [facility] ENDO;  Service: Endoscopy;  Laterality: N/A;    COLONOSCOPY WITH FECAL MICROBIOTA TRANSFER N/A 3/21/2023    Procedure: COLONOSCOPY, WITH FECAL MICROBIOTA TRANSFER;  Surgeon: David Millan MD;  Location: [facility] ENDO;  Service: Endoscopy;  Laterality: N/A;    ESOPHAGOGASTRODUODENOSCOPY N/A 4/24/2023    Procedure: EGD (ESOPHAGOGASTRODUODENOSCOPY);  Surgeon: Shan Jean III, MD;  Location: [facility] ENDO;  Service: Endoscopy;  Laterality: N/A;    GANGLION CYST EXCISION Left 1992    UMBILICAL HERNIA REPAIR  2011    with mesh       Review of patient's allergies indicates:  No Known Allergies    Current Facility-Administered Medications on File Prior to Encounter   Medication    lactated ringers infusion     Current Outpatient Medications on File Prior to Encounter   Medication Sig    busPIRone (BUSPAR) 5 MG Tab TAKE 1/2 TABLET (2.5 mg) daily while on voriconazole (Patient taking differently: Take 2.5 mg by mouth once daily. TAKE 1/2 TABLET (2.5 mg) daily while on voriconazole)    colesevelam (WELCHOL) 625 mg tablet Take 3 tablets (1,875 mg total) by mouth 2 (two) times daily with meals. Please separate from voriconazole    dicyclomine (BENTYL) 10 MG capsule Take 10 mg by mouth 3 (three) times daily as needed.    LORazepam (ATIVAN) 0.5 MG tablet Take 1 tablet (0.5 mg total) by mouth every 6 (six) hours as needed for Anxiety.    metFORMIN (GLUCOPHAGE-XR) 500 MG ER 24hr tablet Take 1 tablet (500 mg total) by mouth 2 (two) times daily with meals.    metoprolol succinate (TOPROL-XL) 50 MG 24 hr tablet Take 1 tablet (50 mg total) by mouth once daily.    pantoprazole (PROTONIX) 40 MG tablet Take 1 tablet (40 mg total) by mouth 2 (two) times daily.    semaglutide (OZEMPIC) 1 mg/dose (4 mg/3 mL) Inject 1 mg into the skin every 7 days.    varenicline (CHANTIX) 1  mg Tab Take 1 tablet (1 mg total) by mouth 2 (two) times daily.    zolpidem (AMBIEN) 10 mg Tab Take 1 tablet (10 mg total) by mouth nightly as needed (insomnia).    diphenhydrAMINE (BENADRYL) 25 mg capsule Take 1 capsule (25 mg total) by mouth every 6 (six) hours as needed for Itching.    promethazine (PHENERGAN) 25 MG tablet Take 25 mg by mouth 4 (four) times daily as needed for Nausea.    [DISCONTINUED] simvastatin (ZOCOR) 20 MG tablet Take 1 tablet (20 mg total) by mouth every evening. (Patient not taking: Reported on 1/30/2024)     Family History       Problem Relation (Age of Onset)    Asthma Mother          Tobacco Use    Smoking status: Former     Current packs/day: 1.00     Average packs/day: 1 pack/day for 31.1 years (31.1 ttl pk-yrs)     Types: Cigarettes     Start date: 1993    Smokeless tobacco: Never    Tobacco comments:     Pt states he has stopped smoking with Chantix three weeks ago. 12/1/23   Substance and Sexual Activity    Alcohol use: Yes     Comment: ocass    Drug use: Not Currently    Sexual activity: Not Currently     Review of Systems   Constitutional:  Positive for activity change, appetite change and fatigue.   Gastrointestinal:  Positive for abdominal pain, diarrhea, nausea and vomiting. Negative for blood in stool.     Objective:     Vital Signs (Most Recent):  Temp: 98.3 °F (36.8 °C) (02/12/24 1610)  Pulse: 98 (02/13/24 1052)  Resp: 18 (02/12/24 1610)  BP: 132/66 (02/13/24 1052)  SpO2: 97 % (02/13/24 1052) Vital Signs (24h Range):  Temp:  [98.3 °F (36.8 °C)] 98.3 °F (36.8 °C)  Pulse:  [] 98  Resp:  [18] 18  SpO2:  [96 %-99 %] 97 %  BP: (111-150)/(53-88) 132/66     Weight: 130.2 kg (287 lb)  Body mass index is 47.76 kg/m².     Physical Exam  Constitutional:       Appearance: Normal appearance.   Cardiovascular:      Rate and Rhythm: Normal rate and regular rhythm.   Pulmonary:      Effort: Pulmonary effort is normal. No respiratory distress.   Abdominal:      General: There is no  "distension.      Tenderness: There is no abdominal tenderness.   Neurological:      Mental Status: He is alert.                Significant Labs: All pertinent labs within the past 24 hours have been reviewed.  BMP:   Recent Labs   Lab 02/12/24  1628   GLU 70      K 4.5      CO2 23   BUN 20   CREATININE 1.3   CALCIUM 9.9     CBC:   Recent Labs   Lab 02/12/24  1628   WBC 18.50*   HGB 14.3   HCT 44.3          Significant Imaging: I have reviewed all pertinent imaging results/findings within the past 24 hours.  Assessment/Plan:     Status post fecal microbiota transplant  Recurrent cdiff- stool studies pending. No diarrhea presently.      Ulcerative colitis  Stool studies pending. No N/V/D since Sunday. Poor appetite. Cipro and flagyl initiated. GI consulted. IV hydration       Type 2 diabetes mellitus without complication, without long-term current use of insulin  Patient's FSGs are controlled on current medication regimen.  Last A1c reviewed-   Lab Results   Component Value Date    HGBA1C 5.2 10/27/2023     Most recent fingerstick glucose reviewed- No results for input(s): "POCTGLUCOSE" in the last 24 hours.  Current correctional scale  Low  Maintain anti-hyperglycemic dose as follows-   Antihyperglycemics (From admission, onward)      None          Hold Oral hypoglycemics while patient is in the hospital.    Hyperlipemia   Patient is chronically on notstatin.will not continue for now. Monitor clinically. Last LDL was   Lab Results   Component Value Date    LDLCALC 81.4 10/27/2023            Morbid obesity  Body mass index is 47.76 kg/m². Morbid obesity complicates all aspects of disease management from diagnostic modalities to treatment. Weight loss encouraged and health benefits explained to patient.           VTE Risk Mitigation (From admission, onward)      None                 On 02/13/2024, patient should be placed in hospital observation services under my care in collaboration with  " Omar.           Jessica Matamoros NP  Department of Hospital Medicine  [facility] [place] - Emergency Dept

## 2024-02-14 LAB
BACTERIA UR CULT: NORMAL
BACTERIA UR CULT: NORMAL

## 2024-02-14 NOTE — PLAN OF CARE
Patient cleared for discharge from case management standpoint.    Chart and discharge orders reviewed.  Patient discharged home with no further case management needs.     02/13/24 2028   Final Note   Assessment Type Final Discharge Note   Anticipated Discharge Disposition Home   Post-Acute Status   Discharge Delays None known at this time

## 2024-02-14 NOTE — DISCHARGE SUMMARY
[facility] - Emergency Dept  Hospital Medicine  Discharge Summary      Patient Name: Jacoby Jean-Baptiste  MRN: [id number]  DAYSI: [id number]  Patient Class: OP- Observation  Admission Date: 2/12/2024  Hospital Length of Stay: 0 days  Discharge Date and Time: 2/13/2024  8:13 PM  Attending Physician: No att. providers found   Discharging Provider: Shayy Matamoros NP  Primary Care Provider: Hunter Aguilar III, MD    Primary Care Team: Networked reference to record PCT     HPI:   Jacoby Jean-Baptiste is a 49 year old female with PMH of recurrent colitis, hypertension, DM tupe 2, and hyperlipidemia who presents to the ED with nausea, vomiting, and worsening diarrhea x 2 days. He reports no appetite therefore N/V/D have subsided. He reports chronic diarrhea and previously was on Remicaid which was discontinued due to atypical pneumonia. He has medications at home for diarrhea and abdominal cramping however did not take for symptom relief. Denies abdominal pain-only occurs after eating. He received IV cipro and flagyl in ED. He is followed by Dr. Jean, GI. Labs reviewed.- CBC -WBC 18.5. BMP Cr WNL          * No surgery found *      Hospital Course:   Patient monitored closely during observation stay. He was iniated on pain control, cipro, flagyl, and IV hydration. GI consulted and he was initaited on IV solumedrol. Attempted to collect stool for cdiff however patient did not have a bowel movement during stay. Diet was advanced as tolerated. Patient tolerated diet and wished to go home. He was discharged home on steroid taper and recommended for q.4 dosing of his Entyvio. He is medically stable for dc from medical standpoint.         Goals of Care Treatment Preferences:  Code Status: Full Code      Consults:   Consults (From admission, onward)          Status Ordering Provider     Inpatient consult to Gastroenterology  Once        Provider:  Odalis Mccabe MD    Completed SHAYY MATAMOROS       "      GI  * Ulcerative colitis  Stool studies pending. No N/V/D since Sunday. Poor appetite. Cipro and flagyl initiated. GI consulted. IV hydration         Final Active Diagnoses:    Diagnosis Date Noted POA    PRINCIPAL PROBLEM:  Ulcerative colitis [K51.90] 04/20/2023 Yes     Chronic    Status post fecal microbiota transplant [Z92.89] 04/20/2023 Yes    Type 2 diabetes mellitus without complication, without long-term current use of insulin [E11.9] 01/29/2022 Yes    Hyperlipemia [E78.5] 01/29/2022 Yes    Morbid obesity [E66.01] 02/03/2021 Yes      Problems Resolved During this Admission:       Discharged Condition: stable    Disposition: Home or Self Care    Follow Up:   Follow-up Information       Hunter Aguilar III, MD Follow up in 1 week(s).    Specialty: Family Medicine  Contact information:  [address]  [place] [address] [phone]               Shan Jean III, MD Follow up in 1 week(s).    Specialty: Gastroenterology  Contact information:  [address] [facility] [address] [phone]                           Patient Instructions:      Diet Cardiac   Order Comments: Gi bland diet     Notify your health care provider if you experience any of the following:  severe uncontrolled pain     Notify your health care provider if you experience any of the following:  persistent nausea and vomiting or diarrhea     Activity as tolerated       Significant Diagnostic Studies: Labs: BMP: No results for input(s): "GLU", "NA", "K", "CL", "CO2", "BUN", "CREATININE", "CALCIUM", "MG" in the last 48 hours. and CMP No results for input(s): "NA", "K", "CL", "CO2", "GLU", "BUN", "CREATININE", "CALCIUM", "PROT", "ALBUMIN", "BILITOT", "ALKPHOS", "AST", "ALT", "ANIONGAP", "ESTGFRAFRICA", "EGFRNONAA" in the last 48 hours.    Pending Diagnostic Studies:       None           Medications:  Reconciled Home Medications:      Medication List        START taking these medications      predniSONE 20 MG " tablet  Commonly known as: DELTASONE  Take 2 tablets (40 mg total) by mouth once daily.            CHANGE how you take these medications      busPIRone 5 MG Tab  Commonly known as: BUSPAR  TAKE 1/2 TABLET (2.5 mg) daily while on voriconazole  What changed:   how much to take  how to take this  when to take this     colesevelam 625 mg tablet  Commonly known as: WELCHOL  Take 3 tablets (1,875 mg total) by mouth 2 (two) times daily with meals. Please separate from voriconazole  What changed:   when to take this  reasons to take this            CONTINUE taking these medications      dicyclomine 10 MG capsule  Commonly known as: BENTYL  Take 10 mg by mouth 3 (three) times daily as needed.     diphenhydrAMINE 25 mg capsule  Commonly known as: BENADRYL  Take 1 capsule (25 mg total) by mouth every 6 (six) hours as needed for Itching.     LORazepam 0.5 MG tablet  Commonly known as: ATIVAN  Take 1 tablet (0.5 mg total) by mouth every 6 (six) hours as needed for Anxiety.     metFORMIN 500 MG ER 24hr tablet  Commonly known as: GLUCOPHAGE-XR  Take 1 tablet (500 mg total) by mouth 2 (two) times daily with meals.     metoprolol succinate 50 MG 24 hr tablet  Commonly known as: TOPROL-XL  Take 1 tablet (50 mg total) by mouth once daily.     OZEMPIC 1 mg/dose (4 mg/3 mL)  Generic drug: semaglutide  Inject 1 mg into the skin every 7 days.     pantoprazole 40 MG tablet  Commonly known as: PROTONIX  Take 1 tablet (40 mg total) by mouth 2 (two) times daily.     promethazine 25 MG tablet  Commonly known as: PHENERGAN  Take 25 mg by mouth 4 (four) times daily as needed for Nausea.     varenicline 1 mg Tab  Commonly known as: CHANTIX  Take 1 tablet (1 mg total) by mouth 2 (two) times daily.     zolpidem 10 mg Tab  Commonly known as: AMBIEN  Take 1 tablet (10 mg total) by mouth nightly as needed (insomnia).              Indwelling Lines/Drains at time of discharge:   Lines/Drains/Airways       None                   Time spent on the  discharge of patient: 45 minutes         Jessica Matamoros NP  Department of Hospital Medicine  [facility] - Emergency Dept

## 2024-02-15 ENCOUNTER — PATIENT OUTREACH (OUTPATIENT)
Dept: ADMINISTRATIVE | Facility: CLINIC | Age: 49
End: 2024-02-15
Payer: COMMERCIAL

## 2024-02-15 NOTE — PROGRESS NOTES
C3 nurse attempted to contact Jacoby Jean-Baptiste for a TCC post hospital discharge follow up call. No answer. Left voicemail with callback information. The patient does not have a scheduled HOSFU appointment. Message sent to PCP staff for assistance with scheduling visit with patient.

## 2024-02-15 NOTE — PROGRESS NOTES
C3 nurse spoke with Jacoby Jean-Baptiste for a TCC post hospital discharge follow up call. Nurse offered to scheduled TCC hospital follow-up appointment with PCP. The patient declined appointment at this time.

## 2024-02-16 ENCOUNTER — LAB VISIT (OUTPATIENT)
Dept: LAB | Facility: HOSPITAL | Age: 49
End: 2024-02-16
Attending: INTERNAL MEDICINE
Payer: COMMERCIAL

## 2024-02-16 DIAGNOSIS — R19.7 DIARRHEA OF PRESUMED INFECTIOUS ORIGIN: Primary | ICD-10-CM

## 2024-02-16 LAB
BAKER'S YEAST IGG QN IA: 50 UNITS (ref 0–50)
CHITOBIOSIDE IGA SERPL IA-ACNC: 14 UNITS (ref 0–90)
LABORATORY COMMENT REPORT: NORMAL
LAMINARIBIOSIDE IGG SERPL IA-ACNC: 51 UNITS (ref 0–60)
MANNOBIOSIDE IGG SERPL IA-ACNC: 21 UNITS (ref 0–100)
P-ANCA ATYPICAL SER QL IF: NEGATIVE

## 2024-02-16 PROCEDURE — 83993 ASSAY FOR CALPROTECTIN FECAL: CPT | Performed by: INTERNAL MEDICINE

## 2024-02-18 LAB
BACTERIA BLD CULT: NORMAL
BACTERIA BLD CULT: NORMAL

## 2024-02-20 LAB — CALPROTECTIN STL-MCNT: 3070 UG/G (ref 0–120)

## 2024-02-22 NOTE — HOSPITAL COURSE
Patient monitored closely during observation stay. He was iniated on pain control, cipro, flagyl, and IV hydration. GI consulted and he was initaited on IV solumedrol. Attempted to collect stool for cdiff however patient did not have a bowel movement during stay. Diet was advanced as tolerated. Patient tolerated diet and wished to go home. He was discharged home on steroid taper and recommended for q.4 dosing of his Entyvio. He is medically stable for dc from medical standpoint.

## 2024-03-03 LAB
OHS QRS DURATION: 70 MS
OHS QTC CALCULATION: 433 MS

## 2024-03-04 PROBLEM — J18.9 PNEUMONIA OF BOTH LUNGS DUE TO INFECTIOUS ORGANISM: Status: RESOLVED | Noted: 2023-11-30 | Resolved: 2024-03-04

## 2024-03-05 ENCOUNTER — OFFICE VISIT (OUTPATIENT)
Dept: OPHTHALMOLOGY | Facility: CLINIC | Age: 49
End: 2024-03-05
Payer: COMMERCIAL

## 2024-03-05 DIAGNOSIS — H11.152 PINGUECULA, LEFT EYE: Primary | ICD-10-CM

## 2024-03-05 DIAGNOSIS — H11.442 CONJUNCTIVAL CYST, LEFT: ICD-10-CM

## 2024-03-05 PROCEDURE — 99213 OFFICE O/P EST LOW 20 MIN: CPT | Mod: S$GLB,,, | Performed by: OPHTHALMOLOGY

## 2024-03-05 PROCEDURE — 1159F MED LIST DOCD IN RCRD: CPT | Mod: CPTII,S$GLB,, | Performed by: OPHTHALMOLOGY

## 2024-03-05 PROCEDURE — 1160F RVW MEDS BY RX/DR IN RCRD: CPT | Mod: CPTII,S$GLB,, | Performed by: OPHTHALMOLOGY

## 2024-03-05 PROCEDURE — 99999 PR PBB SHADOW E&M-EST. PATIENT-LVL III: CPT | Mod: PBBFAC,,, | Performed by: OPHTHALMOLOGY

## 2024-03-05 NOTE — PROGRESS NOTES
HPI     Eye Problem     Additional comments: Urgent- Pt c/o eye discomfort OS ongoing for about a   week. States he feels like there is a pimple on his eye ball no new light   sensitivity occasional tears. States pain level is at 3 yo a 5 OS.  Wears   SCL extended wear states he changes lens about every 7 days. Used Visine   and SCL solution over the past week no help.           Last edited by Blessing Kenny on 3/5/2024 11:25 AM.            Assessment /Plan     For exam results, see Encounter Report.    Pinguecula, left eye    Conjunctival cyst, left      Two benign, common conjunctival conditions  Explained dx to patient, reassured  Recommend artificial tears up to QID  Give CL break for a few days    F/u as scheduled

## 2024-03-07 ENCOUNTER — PATIENT MESSAGE (OUTPATIENT)
Dept: FAMILY MEDICINE | Facility: CLINIC | Age: 49
End: 2024-03-07
Payer: COMMERCIAL

## 2024-03-07 DIAGNOSIS — F41.9 ANXIETY: ICD-10-CM

## 2024-03-07 RX ORDER — LORAZEPAM 0.5 MG/1
0.5 TABLET ORAL EVERY 6 HOURS PRN
Qty: 60 TABLET | Refills: 2 | Status: SHIPPED | OUTPATIENT
Start: 2024-03-07 | End: 2024-05-24

## 2024-03-07 RX ORDER — SERTRALINE HYDROCHLORIDE 100 MG/1
100 TABLET, FILM COATED ORAL DAILY
Qty: 90 TABLET | Refills: 1 | Status: SHIPPED | OUTPATIENT
Start: 2024-03-07 | End: 2025-03-07

## 2024-03-07 RX ORDER — BUSPIRONE HYDROCHLORIDE 7.5 MG/1
7.5 TABLET ORAL 3 TIMES DAILY
Qty: 270 TABLET | Refills: 1 | Status: SHIPPED | OUTPATIENT
Start: 2024-03-07 | End: 2025-03-07

## 2024-03-08 NOTE — PROGRESS NOTES
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 . He is no longer taking on voriconazole and would like to restart his anxiety medications.  He was previously on Zoloft 100 mg daily.  Patient advised to take a half tablet of Zoloft 100 mg daily for 2 weeks then can go back up to 100 mg daily.  Also refilled BuSpar 7.5 mg 3 times daily.  Can continue lorazepam for now but consider discontinuation of this once he is reestablished on his regular medications.  Refills sent with  checked.

## 2024-03-14 ENCOUNTER — HOSPITAL ENCOUNTER (INPATIENT)
Facility: HOSPITAL | Age: 49
LOS: 6 days | Discharge: HOME | DRG: 386 | End: 2024-03-21
Attending: EMERGENCY MEDICINE | Admitting: STUDENT IN AN ORGANIZED HEALTH CARE EDUCATION/TRAINING PROGRAM
Payer: COMMERCIAL

## 2024-03-14 ENCOUNTER — PATIENT MESSAGE (OUTPATIENT)
Dept: PULMONOLOGY | Facility: CLINIC | Age: 49
End: 2024-03-14

## 2024-03-14 DIAGNOSIS — K51.90 ULCERATIVE COLITIS: Primary | ICD-10-CM

## 2024-03-14 DIAGNOSIS — K51.90 UC (ULCERATIVE COLITIS): ICD-10-CM

## 2024-03-14 LAB
ALBUMIN SERPL BCP-MCNC: 3.4 G/DL (ref 3.5–5.2)
ALP SERPL-CCNC: 84 U/L (ref 55–135)
ALT SERPL W/O P-5'-P-CCNC: 19 U/L (ref 10–44)
ANION GAP SERPL CALC-SCNC: 9 MMOL/L (ref 8–16)
AST SERPL-CCNC: 8 U/L (ref 10–40)
BASOPHILS # BLD AUTO: 0.11 K/UL (ref 0–0.2)
BASOPHILS NFR BLD: 0.7 % (ref 0–1.9)
BILIRUB SERPL-MCNC: 0.3 MG/DL (ref 0.1–1)
BUN SERPL-MCNC: 21 MG/DL (ref 6–20)
CALCIUM SERPL-MCNC: 9 MG/DL (ref 8.7–10.5)
CHLORIDE SERPL-SCNC: 105 MMOL/L (ref 95–110)
CO2 SERPL-SCNC: 24 MMOL/L (ref 23–29)
CREAT SERPL-MCNC: 1.1 MG/DL (ref 0.5–1.4)
DIFFERENTIAL METHOD BLD: ABNORMAL
EOSINOPHIL # BLD AUTO: 0.1 K/UL (ref 0–0.5)
EOSINOPHIL NFR BLD: 0.8 % (ref 0–8)
ERYTHROCYTE [DISTWIDTH] IN BLOOD BY AUTOMATED COUNT: 14.1 % (ref 11.5–14.5)
EST. GFR  (NO RACE VARIABLE): >60 ML/MIN/1.73 M^2
GLUCOSE SERPL-MCNC: 92 MG/DL (ref 70–110)
HCT VFR BLD AUTO: 38.3 % (ref 40–54)
HGB BLD-MCNC: 12.4 G/DL (ref 14–18)
IMM GRANULOCYTES # BLD AUTO: 0.3 K/UL (ref 0–0.04)
IMM GRANULOCYTES NFR BLD AUTO: 2 % (ref 0–0.5)
LACTATE SERPL-SCNC: 1.1 MMOL/L (ref 0.5–1.9)
LIPASE SERPL-CCNC: <3 U/L (ref 4–60)
LYMPHOCYTES # BLD AUTO: 1.5 K/UL (ref 1–4.8)
LYMPHOCYTES NFR BLD: 10.2 % (ref 18–48)
MCH RBC QN AUTO: 31.8 PG (ref 27–31)
MCHC RBC AUTO-ENTMCNC: 32.4 G/DL (ref 32–36)
MCV RBC AUTO: 98 FL (ref 82–98)
MONOCYTES # BLD AUTO: 1.9 K/UL (ref 0.3–1)
MONOCYTES NFR BLD: 12.3 % (ref 4–15)
NEUTROPHILS # BLD AUTO: 11.1 K/UL (ref 1.8–7.7)
NEUTROPHILS NFR BLD: 74 % (ref 38–73)
NRBC BLD-RTO: 0 /100 WBC
OB PNL STL: POSITIVE
PLATELET # BLD AUTO: 398 K/UL (ref 150–450)
PMV BLD AUTO: 9.7 FL (ref 9.2–12.9)
POTASSIUM SERPL-SCNC: 4 MMOL/L (ref 3.5–5.1)
PROT SERPL-MCNC: 7.3 G/DL (ref 6–8.4)
RBC # BLD AUTO: 3.9 M/UL (ref 4.6–6.2)
SODIUM SERPL-SCNC: 138 MMOL/L (ref 136–145)
WBC # BLD AUTO: 15.04 K/UL (ref 3.9–12.7)
WBC #/AREA STL HPF: ABNORMAL /[HPF]

## 2024-03-14 PROCEDURE — 83605 ASSAY OF LACTIC ACID: CPT | Performed by: NURSE PRACTITIONER

## 2024-03-14 PROCEDURE — 25500020 PHARM REV CODE 255: Performed by: NURSE PRACTITIONER

## 2024-03-14 PROCEDURE — 25000003 PHARM REV CODE 250: Performed by: HOSPITALIST

## 2024-03-14 PROCEDURE — G0378 HOSPITAL OBSERVATION PER HR: HCPCS

## 2024-03-14 PROCEDURE — 83690 ASSAY OF LIPASE: CPT | Performed by: PHYSICIAN ASSISTANT

## 2024-03-14 PROCEDURE — 87045 FECES CULTURE AEROBIC BACT: CPT | Performed by: EMERGENCY MEDICINE

## 2024-03-14 PROCEDURE — 80053 COMPREHEN METABOLIC PANEL: CPT | Performed by: PHYSICIAN ASSISTANT

## 2024-03-14 PROCEDURE — 87449 NOS EACH ORGANISM AG IA: CPT | Mod: 91 | Performed by: EMERGENCY MEDICINE

## 2024-03-14 PROCEDURE — 96361 HYDRATE IV INFUSION ADD-ON: CPT

## 2024-03-14 PROCEDURE — 36415 COLL VENOUS BLD VENIPUNCTURE: CPT | Performed by: NURSE PRACTITIONER

## 2024-03-14 PROCEDURE — 82272 OCCULT BLD FECES 1-3 TESTS: CPT | Performed by: EMERGENCY MEDICINE

## 2024-03-14 PROCEDURE — 87046 STOOL CULTR AEROBIC BACT EA: CPT | Performed by: EMERGENCY MEDICINE

## 2024-03-14 PROCEDURE — 63600175 PHARM REV CODE 636 W HCPCS: Performed by: EMERGENCY MEDICINE

## 2024-03-14 PROCEDURE — 99285 EMERGENCY DEPT VISIT HI MDM: CPT | Mod: 25

## 2024-03-14 PROCEDURE — 87449 NOS EACH ORGANISM AG IA: CPT | Performed by: EMERGENCY MEDICINE

## 2024-03-14 PROCEDURE — 96375 TX/PRO/DX INJ NEW DRUG ADDON: CPT

## 2024-03-14 PROCEDURE — 87177 OVA AND PARASITES SMEARS: CPT | Performed by: EMERGENCY MEDICINE

## 2024-03-14 PROCEDURE — 25000003 PHARM REV CODE 250: Performed by: NURSE PRACTITIONER

## 2024-03-14 PROCEDURE — 85025 COMPLETE CBC W/AUTO DIFF WBC: CPT | Performed by: PHYSICIAN ASSISTANT

## 2024-03-14 PROCEDURE — 96374 THER/PROPH/DIAG INJ IV PUSH: CPT

## 2024-03-14 PROCEDURE — 25000003 PHARM REV CODE 250: Performed by: PHYSICIAN ASSISTANT

## 2024-03-14 PROCEDURE — 63600175 PHARM REV CODE 636 W HCPCS: Performed by: NURSE PRACTITIONER

## 2024-03-14 PROCEDURE — 30000890 LABCORP MISCELLANEOUS TEST: Performed by: EMERGENCY MEDICINE

## 2024-03-14 PROCEDURE — 89055 LEUKOCYTE ASSESSMENT FECAL: CPT | Performed by: EMERGENCY MEDICINE

## 2024-03-14 PROCEDURE — 25000003 PHARM REV CODE 250: Performed by: EMERGENCY MEDICINE

## 2024-03-14 RX ORDER — TALC
6 POWDER (GRAM) TOPICAL NIGHTLY PRN
Status: DISCONTINUED | OUTPATIENT
Start: 2024-03-14 | End: 2024-03-21 | Stop reason: HOSPADM

## 2024-03-14 RX ORDER — ZOLPIDEM TARTRATE 5 MG/1
10 TABLET ORAL NIGHTLY PRN
Status: DISCONTINUED | OUTPATIENT
Start: 2024-03-14 | End: 2024-03-21 | Stop reason: HOSPADM

## 2024-03-14 RX ORDER — GLUCAGON 1 MG
1 KIT INJECTION
Status: DISCONTINUED | OUTPATIENT
Start: 2024-03-14 | End: 2024-03-21 | Stop reason: HOSPADM

## 2024-03-14 RX ORDER — SODIUM CHLORIDE 0.9 % (FLUSH) 0.9 %
10 SYRINGE (ML) INJECTION
Status: DISCONTINUED | OUTPATIENT
Start: 2024-03-14 | End: 2024-03-21 | Stop reason: HOSPADM

## 2024-03-14 RX ORDER — HYDROCODONE BITARTRATE AND ACETAMINOPHEN 5; 325 MG/1; MG/1
1 TABLET ORAL EVERY 6 HOURS PRN
Status: DISCONTINUED | OUTPATIENT
Start: 2024-03-14 | End: 2024-03-21 | Stop reason: HOSPADM

## 2024-03-14 RX ORDER — SODIUM CHLORIDE 9 MG/ML
INJECTION, SOLUTION INTRAVENOUS CONTINUOUS
Status: DISCONTINUED | OUTPATIENT
Start: 2024-03-14 | End: 2024-03-14

## 2024-03-14 RX ORDER — HYDROMORPHONE HYDROCHLORIDE 1 MG/ML
1 INJECTION, SOLUTION INTRAMUSCULAR; INTRAVENOUS; SUBCUTANEOUS
Status: COMPLETED | OUTPATIENT
Start: 2024-03-14 | End: 2024-03-14

## 2024-03-14 RX ORDER — ACETAMINOPHEN 325 MG/1
650 TABLET ORAL EVERY 4 HOURS PRN
Status: DISCONTINUED | OUTPATIENT
Start: 2024-03-14 | End: 2024-03-21 | Stop reason: HOSPADM

## 2024-03-14 RX ORDER — LANOLIN ALCOHOL/MO/W.PET/CERES
800 CREAM (GRAM) TOPICAL
Status: DISCONTINUED | OUTPATIENT
Start: 2024-03-14 | End: 2024-03-21 | Stop reason: HOSPADM

## 2024-03-14 RX ORDER — ACETAMINOPHEN 325 MG/1
650 TABLET ORAL EVERY 8 HOURS PRN
Status: DISCONTINUED | OUTPATIENT
Start: 2024-03-14 | End: 2024-03-14

## 2024-03-14 RX ORDER — IBUPROFEN 200 MG
24 TABLET ORAL
Status: DISCONTINUED | OUTPATIENT
Start: 2024-03-14 | End: 2024-03-21 | Stop reason: HOSPADM

## 2024-03-14 RX ORDER — SODIUM CHLORIDE 9 MG/ML
INJECTION, SOLUTION INTRAVENOUS CONTINUOUS
Status: DISCONTINUED | OUTPATIENT
Start: 2024-03-14 | End: 2024-03-16

## 2024-03-14 RX ORDER — SODIUM,POTASSIUM PHOSPHATES 280-250MG
2 POWDER IN PACKET (EA) ORAL
Status: DISCONTINUED | OUTPATIENT
Start: 2024-03-14 | End: 2024-03-21 | Stop reason: HOSPADM

## 2024-03-14 RX ORDER — SODIUM CHLORIDE 9 MG/ML
1000 INJECTION, SOLUTION INTRAVENOUS
Status: COMPLETED | OUTPATIENT
Start: 2024-03-14 | End: 2024-03-14

## 2024-03-14 RX ORDER — ALUMINUM HYDROXIDE, MAGNESIUM HYDROXIDE, AND SIMETHICONE 1200; 120; 1200 MG/30ML; MG/30ML; MG/30ML
30 SUSPENSION ORAL 4 TIMES DAILY PRN
Status: DISCONTINUED | OUTPATIENT
Start: 2024-03-14 | End: 2024-03-21 | Stop reason: HOSPADM

## 2024-03-14 RX ORDER — ONDANSETRON HYDROCHLORIDE 2 MG/ML
4 INJECTION, SOLUTION INTRAVENOUS EVERY 6 HOURS PRN
Status: DISCONTINUED | OUTPATIENT
Start: 2024-03-14 | End: 2024-03-21 | Stop reason: HOSPADM

## 2024-03-14 RX ORDER — SIMVASTATIN 20 MG/1
20 TABLET, FILM COATED ORAL NIGHTLY
COMMUNITY
Start: 2024-03-06

## 2024-03-14 RX ORDER — ONDANSETRON HYDROCHLORIDE 2 MG/ML
4 INJECTION, SOLUTION INTRAVENOUS
Status: COMPLETED | OUTPATIENT
Start: 2024-03-14 | End: 2024-03-14

## 2024-03-14 RX ORDER — SODIUM CHLORIDE 0.9 % (FLUSH) 0.9 %
2 SYRINGE (ML) INJECTION EVERY 12 HOURS PRN
Status: DISCONTINUED | OUTPATIENT
Start: 2024-03-14 | End: 2024-03-21 | Stop reason: HOSPADM

## 2024-03-14 RX ORDER — HYDROMORPHONE HYDROCHLORIDE 1 MG/ML
1 INJECTION, SOLUTION INTRAMUSCULAR; INTRAVENOUS; SUBCUTANEOUS EVERY 4 HOURS PRN
Status: DISCONTINUED | OUTPATIENT
Start: 2024-03-14 | End: 2024-03-16

## 2024-03-14 RX ORDER — INSULIN ASPART 100 [IU]/ML
0-5 INJECTION, SOLUTION INTRAVENOUS; SUBCUTANEOUS
Status: DISCONTINUED | OUTPATIENT
Start: 2024-03-14 | End: 2024-03-21 | Stop reason: HOSPADM

## 2024-03-14 RX ORDER — IBUPROFEN 200 MG
16 TABLET ORAL
Status: DISCONTINUED | OUTPATIENT
Start: 2024-03-14 | End: 2024-03-21 | Stop reason: HOSPADM

## 2024-03-14 RX ADMIN — HYDROMORPHONE HYDROCHLORIDE 1 MG: 1 INJECTION, SOLUTION INTRAMUSCULAR; INTRAVENOUS; SUBCUTANEOUS at 10:03

## 2024-03-14 RX ADMIN — SODIUM CHLORIDE: 9 INJECTION, SOLUTION INTRAVENOUS at 10:03

## 2024-03-14 RX ADMIN — METHYLPREDNISOLONE SODIUM SUCCINATE 60 MG: 40 INJECTION, POWDER, FOR SOLUTION INTRAMUSCULAR; INTRAVENOUS at 09:03

## 2024-03-14 RX ADMIN — ZOLPIDEM TARTRATE 10 MG: 5 TABLET, COATED ORAL at 10:03

## 2024-03-14 RX ADMIN — IOHEXOL 100 ML: 350 INJECTION, SOLUTION INTRAVENOUS at 04:03

## 2024-03-14 RX ADMIN — SODIUM CHLORIDE 1000 ML: 9 INJECTION, SOLUTION INTRAVENOUS at 03:03

## 2024-03-14 RX ADMIN — ONDANSETRON 4 MG: 2 INJECTION INTRAMUSCULAR; INTRAVENOUS at 06:03

## 2024-03-14 RX ADMIN — SODIUM CHLORIDE 1000 ML: 9 INJECTION, SOLUTION INTRAVENOUS at 06:03

## 2024-03-14 RX ADMIN — HYDROMORPHONE HYDROCHLORIDE 1 MG: 1 INJECTION, SOLUTION INTRAMUSCULAR; INTRAVENOUS; SUBCUTANEOUS at 06:03

## 2024-03-14 NOTE — FIRST PROVIDER EVALUATION
Emergency Department TeleTriage Encounter Note      CHIEF COMPLAINT    Chief Complaint   Patient presents with    Rectal Bleeding    Diarrhea       VITAL SIGNS   Initial Vitals [03/14/24 1353]   BP Pulse Resp Temp SpO2   125/80 98 18 98.5 °F (36.9 °C) 96 %      MAP       --            ALLERGIES    Review of patient's allergies indicates:  No Known Allergies    PROVIDER TRIAGE NOTE  Patient with history of UC presenting with bloody diarrhea. Symptoms gradually worsening. Also reports nausea and vomiting. No recent antibiotics.       ORDERS  Labs Reviewed - No data to display    ED Orders (720h ago, onward)      None              Virtual Visit Note: The provider triage portion of this emergency department evaluation and documentation was performed via [employer], a HIPAA-compliant telemedicine application, in concert with a tele-presenter in the room. A face to face patient evaluation with one of my colleagues will occur once the patient is placed in an emergency department room.      DISCLAIMER: This note was prepared with [employer] voice recognition transcription software. Garbled syntax, mangled pronouns, and other bizarre constructions may be attributed to that software system.

## 2024-03-14 NOTE — ED PROVIDER NOTES
Encounter Date: 3/14/2024       History     Chief Complaint   Patient presents with    Rectal Bleeding    Diarrhea     Patient presents emergency department with reported bloody diarrhea over last 4 days associated nausea vomiting worsening abdominal pain patient has a history of ulcerative colitis who history of C diff in the past he sees Dr. Adama giron he receives regular infusions and is on budesonide on a daily basis as well despite these medications his symptoms have flared he denies any recorded fever no urinary symptoms reported        Review of patient's allergies indicates:  No Known Allergies  Past Medical History:   Diagnosis Date    C. difficile colitis     Cavitary pneumonia 11/2023    pos aspergillus serology    Diabetes mellitus 01/2022    Hyperlipidemia 2015    Hypertension 2015    Insomnia 2015    Ulcerative colitis      Past Surgical History:   Procedure Laterality Date    BRONCHOSCOPY WITH FLUOROSCOPY Left 11/20/2023    Procedure: BRONCHOSCOPY, WITH FLUOROSCOPY;  Surgeon: Lisa Villarreal MD;  Location: [facility];  Service: Pulmonary;  Laterality: Left;    BRONCHOSCOPY WITH FLUOROSCOPY N/A 11/30/2023    Procedure: BRONCHOSCOPY, WITH FLUOROSCOPY;  Surgeon: Lisa Villarreal MD;  Location: [facility];  Service: Pulmonary;  Laterality: N/A;    CARDIAC ELECTROPHYSIOLOGY MAPPING AND ABLATION  2017    SVT    CHOLECYSTECTOMY  2011    COLONOSCOPY N/A 5/3/2022    Procedure: COLONOSCOPY;  Surgeon: Shan Jean III, MD;  Location: [facility];  Service: Endoscopy;  Laterality: N/A;    COLONOSCOPY WITH FECAL MICROBIOTA TRANSFER N/A 3/21/2023    Procedure: COLONOSCOPY, WITH FECAL MICROBIOTA TRANSFER;  Surgeon: David Millan MD;  Location: [facility];  Service: Endoscopy;  Laterality: N/A;    ESOPHAGOGASTRODUODENOSCOPY N/A 4/24/2023    Procedure: EGD (ESOPHAGOGASTRODUODENOSCOPY);  Surgeon: Shan Jean III, MD;  Location: [facility];  Service: Endoscopy;  Laterality: N/A;    GANGLION CYST  EXCISION Left 1992    UMBILICAL HERNIA REPAIR  2011    with mesh     Family History   Problem Relation Age of Onset    Asthma Mother      Social History     Tobacco Use    Smoking status: Former     Current packs/day: 1.00     Average packs/day: 1 pack/day for 31.2 years (31.2 ttl pk-yrs)     Types: Cigarettes     Start date: 1993    Smokeless tobacco: Never    Tobacco comments:     Pt states he has stopped smoking with Chantix three weeks ago. 12/1/23   Substance Use Topics    Alcohol use: Yes     Comment: ocass    Drug use: Not Currently     Review of Systems   Constitutional:  Positive for appetite change and fatigue. Negative for chills and fever.   Gastrointestinal:  Positive for abdominal pain, blood in stool, diarrhea, nausea and vomiting.   All other systems reviewed and are negative.      Physical Exam     Initial Vitals [03/14/24 1353]   BP Pulse Resp Temp SpO2   125/80 98 18 98.5 °F (36.9 °C) 96 %      MAP       --         Physical Exam    Constitutional: He appears well-developed and well-nourished. No distress.   HENT:   Head: Normocephalic and atraumatic.   Right Ear: External ear normal.   Left Ear: External ear normal.   Mouth/Throat: Oropharynx is clear and moist.   Eyes: Pupils are equal, round, and reactive to light.   Neck: Neck supple.   Normal range of motion.  Cardiovascular:  Normal rate, regular rhythm, S1 normal, S2 normal, normal heart sounds and intact distal pulses.           Pulmonary/Chest: Breath sounds normal.   Abdominal: Abdomen is soft. Bowel sounds are normal. He exhibits no distension. There is abdominal tenderness.   Musculoskeletal:         General: Normal range of motion.      Cervical back: Normal range of motion and neck supple.     Neurological: He is alert and oriented to person, place, and time. GCS eye subscore is 4. GCS verbal subscore is 5. GCS motor subscore is 6.   Skin: Skin is warm and dry. No rash noted.   Psychiatric: He has a normal mood and affect. His  behavior is normal.         ED Course   Procedures  Labs Reviewed   CBC W/ AUTO DIFFERENTIAL - Abnormal; Notable for the following components:       Result Value    WBC 15.04 (*)     RBC 3.90 (*)     Hemoglobin 12.4 (*)     Hematocrit 38.3 (*)     MCH 31.8 (*)     Immature Granulocytes 2.0 (*)     Gran # (ANC) 11.1 (*)     Immature Grans (Abs) 0.30 (*)     Mono # 1.9 (*)     Gran % 74.0 (*)     Lymph % 10.2 (*)     All other components within normal limits   COMPREHENSIVE METABOLIC PANEL - Abnormal; Notable for the following components:    BUN 21 (*)     Albumin 3.4 (*)     AST 8 (*)     All other components within normal limits   LIPASE - Abnormal; Notable for the following components:    Lipase <3 (*)     All other components within normal limits   CULTURE, STOOL   CLOSTRIDIUM DIFFICILE   URINALYSIS, REFLEX TO URINE CULTURE   STOOL EXAM-OVA,CYSTS,PARASITES   POCT GLUCOSE, HAND-HELD DEVICE          Imaging Results              CT Abdomen Pelvis With IV Contrast NO Oral Contrast (Final result)  Result time 03/14/24 16:46:11      Final result by Carl Macias MD (03/14/24 16:46:11)                   Narrative:    CMS MANDATED QUALITY DATA-CT RADIATION DOSE-436  All CT scans at this facility use dose modulation, iterative reconstruction, and or weight-based dosing when appropriate to reduce radiation dose to as low as reasonably achievable. Unless otherwise stated, incidental findings do not require dedicated follow-up imaging.    HISTORY: Abdominal pain, acute, nonlocalized  rectal bleeding, diarrhea.    FINDINGS: Axial postcontrast imaging was performed with 100 mL Omnipaque 350 IV contrast. Comparison to the CT of 02/13/2024.    CT ABDOMEN: The lung bases are clear. There are cholecystectomy clips, with the liver enhancing normally. The spleen is normal in size and enhances normally, with the pancreas, adrenal glands and kidneys enhancing normally. There are bilateral nonobstructing renal calculi measuring up to  6 mm on the left, with no ureteral calculi or hydroureteronephrosis.    Scattered calcified plaque involves normal caliber abdominal aorta, with no aortic dissection or aneurysm. No enlarged mesenteric or retroperitoneal lymph nodes. There is a duodenal diverticulum arising from second portion. No small bowel wall thickening or small bowel obstruction.    The appendix is normal. There is long segment circumferential wall thickening, mucosal hyperenhancement and pericolonic fat stranding involving the descending colon, sigmoid colon and rectum, similar to the prior exam. There is no pericolonic rim-enhancing fluid collection or extraluminal free air.    CT PELVIS: Long segment wall thickening, mucosal hyperenhancement and pericolic fat stranding involves the sigmoid colon and rectum, with no rim-enhancing fluid collections. The urinary bladder and pelvic vasculature enhance normally, with rim calcified nodule in the anterior metallic fat, nonspecific but unchanged.    There are no enlarged pelvic or inguinal lymph nodes. The extraperitoneal soft tissues enhance normally. There are no acute fractures or destructive osseous lesions, with intervertebral disc space narrowing and facet arthropathy in the spine. There is degenerative narrowing of the hip joint spaces.    IMPRESSION:  1. Findings compatible with colitis-proctitis of the distal descending colon, sigmoid colon and rectum, nonspecific but likely of an acute infectious etiology.  2. Additional observations as described.    Electronically signed by:  Carl Macias MD  03/14/2024 04:46 PM CDT Workstation: [id number]                                     Medications   sodium chloride 0.9% flush 10 mL (has no administration in time range)   methylPREDNISolone sodium succinate injection 60 mg (has no administration in time range)   sodium chloride 0.9% flush 10 mL (has no administration in time range)   acetaminophen tablet 650 mg (has no administration in time range)    HYDROcodone-acetaminophen 5-325 mg per tablet 1 tablet (has no administration in time range)   HYDROmorphone injection 1 mg (1 mg Intravenous Given 3/14/24 2226)   ondansetron injection 4 mg (has no administration in time range)   sodium chloride 0.9% flush 2 mL (has no administration in time range)   melatonin tablet 6 mg (has no administration in time range)   aluminum-magnesium hydroxide-simethicone 200-200-20 mg/5 mL suspension 30 mL (has no administration in time range)   potassium bicarbonate disintegrating tablet 50 mEq (has no administration in time range)   potassium bicarbonate disintegrating tablet 35 mEq (has no administration in time range)   potassium bicarbonate disintegrating tablet 60 mEq (has no administration in time range)   magnesium oxide tablet 800 mg (has no administration in time range)   magnesium oxide tablet 800 mg (has no administration in time range)   potassium, sodium phosphates 280-160-250 mg packet 2 packet (has no administration in time range)   potassium, sodium phosphates 280-160-250 mg packet 2 packet (has no administration in time range)   potassium, sodium phosphates 280-160-250 mg packet 2 packet (has no administration in time range)   glucose chewable tablet 16 g (has no administration in time range)   glucose chewable tablet 24 g (has no administration in time range)   dextrose 50% injection 12.5 g (has no administration in time range)   dextrose 50% injection 25 g (has no administration in time range)   glucagon (human recombinant) injection 1 mg (has no administration in time range)   insulin aspart U-100 pen 0-5 Units (has no administration in time range)   0.9%  NaCl infusion ( Intravenous New Bag 3/14/24 2227)   zolpidem tablet 10 mg (10 mg Oral Given 3/14/24 2253)   0.9%  NaCl infusion (0 mLs Intravenous Stopped 3/14/24 1814)   iohexoL (OMNIPAQUE 350) injection 100 mL (100 mLs Intravenous Given 3/14/24 1632)   HYDROmorphone injection 1 mg (1 mg Intravenous Given  3/14/24 1820)   ondansetron injection 4 mg (4 mg Intravenous Given 3/14/24 1820)   0.9%  NaCl infusion (0 mLs Intravenous Stopped 3/14/24 1945)   methylPREDNISolone sodium succinate injection 60 mg (60 mg Intravenous Given 3/14/24 2118)     Medical Decision Making  Laboratory evaluation reviewed CT scan does show evidence of colitis proctitis patient's white blood cell count is elevated however he is on daily steroids I discussed patient's findings with  who recommends IV Solu-Medrol he asked that we obtain stool studies to rule out C diff will consult Hospital Medicine for admission    Amount and/or Complexity of Data Reviewed  Labs: ordered.    Risk  Prescription drug management.                                      Clinical Impression:  Final diagnoses:  [K51.90] UC (ulcerative colitis)          ED Disposition Condition    Observation                 Alan López MD  03/15/24 0038

## 2024-03-15 LAB
ALBUMIN SERPL BCP-MCNC: 3.4 G/DL (ref 3.5–5.2)
ALP SERPL-CCNC: 76 U/L (ref 55–135)
ALT SERPL W/O P-5'-P-CCNC: 16 U/L (ref 10–44)
ANION GAP SERPL CALC-SCNC: 10 MMOL/L (ref 8–16)
AST SERPL-CCNC: 8 U/L (ref 10–40)
BASOPHILS NFR BLD: 0 % (ref 0–1.9)
BILIRUB SERPL-MCNC: 0.2 MG/DL (ref 0.1–1)
BILIRUB UR QL STRIP: NEGATIVE
BUN SERPL-MCNC: 18 MG/DL (ref 6–20)
C DIFF GDH STL QL: NEGATIVE
C DIFF TOX A+B STL QL IA: NEGATIVE
CALCIUM SERPL-MCNC: 8.8 MG/DL (ref 8.7–10.5)
CHLORIDE SERPL-SCNC: 105 MMOL/L (ref 95–110)
CLARITY UR: CLEAR
CO2 SERPL-SCNC: 23 MMOL/L (ref 23–29)
COLOR UR: YELLOW
CREAT SERPL-MCNC: 1 MG/DL (ref 0.5–1.4)
DIFFERENTIAL METHOD BLD: ABNORMAL
EOSINOPHIL NFR BLD: 0 % (ref 0–8)
ERYTHROCYTE [DISTWIDTH] IN BLOOD BY AUTOMATED COUNT: 14.1 % (ref 11.5–14.5)
EST. GFR  (NO RACE VARIABLE): >60 ML/MIN/1.73 M^2
GLUCOSE SERPL-MCNC: 103 MG/DL (ref 70–110)
GLUCOSE SERPL-MCNC: 108 MG/DL (ref 70–110)
GLUCOSE SERPL-MCNC: 109 MG/DL (ref 70–110)
GLUCOSE SERPL-MCNC: 122 MG/DL (ref 70–110)
GLUCOSE SERPL-MCNC: 140 MG/DL (ref 70–110)
GLUCOSE UR QL STRIP: NEGATIVE
HCT VFR BLD AUTO: 40 % (ref 40–54)
HGB BLD-MCNC: 12.6 G/DL (ref 14–18)
HGB UR QL STRIP: NEGATIVE
IMM GRANULOCYTES # BLD AUTO: ABNORMAL K/UL (ref 0–0.04)
IMM GRANULOCYTES NFR BLD AUTO: ABNORMAL % (ref 0–0.5)
KETONES UR QL STRIP: ABNORMAL
LACTATE SERPL-SCNC: 0.8 MMOL/L (ref 0.5–1.9)
LEUKOCYTE ESTERASE UR QL STRIP: NEGATIVE
LYMPHOCYTES NFR BLD: 9 % (ref 18–48)
MAGNESIUM SERPL-MCNC: 1.2 MG/DL (ref 1.6–2.6)
MCH RBC QN AUTO: 31.7 PG (ref 27–31)
MCHC RBC AUTO-ENTMCNC: 31.5 G/DL (ref 32–36)
MCV RBC AUTO: 101 FL (ref 82–98)
MONOCYTES NFR BLD: 2 % (ref 4–15)
MYELOCYTES NFR BLD MANUAL: 1 %
NEUTROPHILS NFR BLD: 76 % (ref 38–73)
NEUTS BAND NFR BLD MANUAL: 12 %
NITRITE UR QL STRIP: NEGATIVE
NRBC BLD-RTO: 0 /100 WBC
PH UR STRIP: 6 [PH] (ref 5–8)
PLATELET # BLD AUTO: 387 K/UL (ref 150–450)
PLATELET BLD QL SMEAR: ABNORMAL
PMV BLD AUTO: 9.3 FL (ref 9.2–12.9)
POTASSIUM SERPL-SCNC: 4.1 MMOL/L (ref 3.5–5.1)
PROT SERPL-MCNC: 7 G/DL (ref 6–8.4)
PROT UR QL STRIP: ABNORMAL
RBC # BLD AUTO: 3.98 M/UL (ref 4.6–6.2)
SODIUM SERPL-SCNC: 138 MMOL/L (ref 136–145)
SP GR UR STRIP: >1.03 (ref 1–1.03)
URN SPEC COLLECT METH UR: ABNORMAL
UROBILINOGEN UR STRIP-ACNC: NEGATIVE EU/DL
WBC # BLD AUTO: 13.34 K/UL (ref 3.9–12.7)

## 2024-03-15 PROCEDURE — 36415 COLL VENOUS BLD VENIPUNCTURE: CPT | Performed by: EMERGENCY MEDICINE

## 2024-03-15 PROCEDURE — 83735 ASSAY OF MAGNESIUM: CPT | Performed by: NURSE PRACTITIONER

## 2024-03-15 PROCEDURE — 80053 COMPREHEN METABOLIC PANEL: CPT | Performed by: NURSE PRACTITIONER

## 2024-03-15 PROCEDURE — 83993 ASSAY FOR CALPROTECTIN FECAL: CPT | Performed by: INTERNAL MEDICINE

## 2024-03-15 PROCEDURE — 21400001 HC TELEMETRY ROOM

## 2024-03-15 PROCEDURE — 63600175 PHARM REV CODE 636 W HCPCS: Performed by: NURSE PRACTITIONER

## 2024-03-15 PROCEDURE — 85027 COMPLETE CBC AUTOMATED: CPT | Performed by: NURSE PRACTITIONER

## 2024-03-15 PROCEDURE — 25000003 PHARM REV CODE 250: Performed by: NURSE PRACTITIONER

## 2024-03-15 PROCEDURE — 85007 BL SMEAR W/DIFF WBC COUNT: CPT | Performed by: NURSE PRACTITIONER

## 2024-03-15 PROCEDURE — 83605 ASSAY OF LACTIC ACID: CPT | Performed by: NURSE PRACTITIONER

## 2024-03-15 PROCEDURE — 81003 URINALYSIS AUTO W/O SCOPE: CPT | Performed by: PHYSICIAN ASSISTANT

## 2024-03-15 PROCEDURE — 25000003 PHARM REV CODE 250: Performed by: INTERNAL MEDICINE

## 2024-03-15 PROCEDURE — 25000003 PHARM REV CODE 250: Performed by: HOSPITALIST

## 2024-03-15 RX ORDER — PANTOPRAZOLE SODIUM 40 MG/1
40 TABLET, DELAYED RELEASE ORAL 2 TIMES DAILY
Status: DISCONTINUED | OUTPATIENT
Start: 2024-03-15 | End: 2024-03-21 | Stop reason: HOSPADM

## 2024-03-15 RX ORDER — POLYETHYLENE GLYCOL 3350 17 G/17G
238 POWDER, FOR SOLUTION ORAL ONCE
Status: COMPLETED | OUTPATIENT
Start: 2024-03-15 | End: 2024-03-15

## 2024-03-15 RX ORDER — BISACODYL 5 MG
10 TABLET, DELAYED RELEASE (ENTERIC COATED) ORAL ONCE
Status: COMPLETED | OUTPATIENT
Start: 2024-03-15 | End: 2024-03-15

## 2024-03-15 RX ORDER — MAGNESIUM SULFATE HEPTAHYDRATE 40 MG/ML
2 INJECTION, SOLUTION INTRAVENOUS ONCE
Status: COMPLETED | OUTPATIENT
Start: 2024-03-15 | End: 2024-03-15

## 2024-03-15 RX ORDER — METOPROLOL SUCCINATE 50 MG/1
50 TABLET, EXTENDED RELEASE ORAL DAILY
Status: DISCONTINUED | OUTPATIENT
Start: 2024-03-15 | End: 2024-03-21 | Stop reason: HOSPADM

## 2024-03-15 RX ADMIN — MAGNESIUM SULFATE HEPTAHYDRATE 2 G: 40 INJECTION, SOLUTION INTRAVENOUS at 10:03

## 2024-03-15 RX ADMIN — METHYLPREDNISOLONE SODIUM SUCCINATE 60 MG: 40 INJECTION, POWDER, FOR SOLUTION INTRAMUSCULAR; INTRAVENOUS at 10:03

## 2024-03-15 RX ADMIN — HYDROMORPHONE HYDROCHLORIDE 1 MG: 1 INJECTION, SOLUTION INTRAMUSCULAR; INTRAVENOUS; SUBCUTANEOUS at 10:03

## 2024-03-15 RX ADMIN — POLYETHYLENE GLYCOL 3350 238 G: 17 POWDER, FOR SOLUTION ORAL at 05:03

## 2024-03-15 RX ADMIN — PANTOPRAZOLE SODIUM 40 MG: 40 TABLET, DELAYED RELEASE ORAL at 09:03

## 2024-03-15 RX ADMIN — BISACODYL 10 MG: 5 TABLET, COATED ORAL at 05:03

## 2024-03-15 RX ADMIN — HYDROMORPHONE HYDROCHLORIDE 1 MG: 1 INJECTION, SOLUTION INTRAMUSCULAR; INTRAVENOUS; SUBCUTANEOUS at 01:03

## 2024-03-15 RX ADMIN — METHYLPREDNISOLONE SODIUM SUCCINATE 60 MG: 40 INJECTION, POWDER, FOR SOLUTION INTRAMUSCULAR; INTRAVENOUS at 01:03

## 2024-03-15 RX ADMIN — ONDANSETRON 4 MG: 2 INJECTION INTRAMUSCULAR; INTRAVENOUS at 06:03

## 2024-03-15 RX ADMIN — METHYLPREDNISOLONE SODIUM SUCCINATE 60 MG: 40 INJECTION, POWDER, FOR SOLUTION INTRAMUSCULAR; INTRAVENOUS at 05:03

## 2024-03-15 RX ADMIN — HYDROMORPHONE HYDROCHLORIDE 1 MG: 1 INJECTION, SOLUTION INTRAMUSCULAR; INTRAVENOUS; SUBCUTANEOUS at 02:03

## 2024-03-15 RX ADMIN — HYDROMORPHONE HYDROCHLORIDE 1 MG: 1 INJECTION, SOLUTION INTRAMUSCULAR; INTRAVENOUS; SUBCUTANEOUS at 07:03

## 2024-03-15 RX ADMIN — HYDROCODONE BITARTRATE AND ACETAMINOPHEN 1 TABLET: 5; 325 TABLET ORAL at 11:03

## 2024-03-15 RX ADMIN — ZOLPIDEM TARTRATE 10 MG: 5 TABLET, COATED ORAL at 10:03

## 2024-03-15 RX ADMIN — ONDANSETRON 4 MG: 2 INJECTION INTRAMUSCULAR; INTRAVENOUS at 10:03

## 2024-03-15 RX ADMIN — SODIUM CHLORIDE: 9 INJECTION, SOLUTION INTRAVENOUS at 06:03

## 2024-03-15 RX ADMIN — SODIUM CHLORIDE: 9 INJECTION, SOLUTION INTRAVENOUS at 07:03

## 2024-03-15 RX ADMIN — PANTOPRAZOLE SODIUM 40 MG: 40 TABLET, DELAYED RELEASE ORAL at 05:03

## 2024-03-15 RX ADMIN — METOPROLOL SUCCINATE 50 MG: 50 TABLET, EXTENDED RELEASE ORAL at 09:03

## 2024-03-15 NOTE — ASSESSMENT & PLAN NOTE
WBC 15.04  Hold abx coverage for now until stool  panel returns.   Trend.   Patient on home prednisone, start IV solumderol TID  Lactic acid level pending

## 2024-03-15 NOTE — ASSESSMENT & PLAN NOTE
Body mass index is 44.93 kg/m². Morbid obesity complicates all aspects of disease management from diagnostic modalities to treatment. Weight loss encouraged and health benefits explained to patient.

## 2024-03-15 NOTE — ASSESSMENT & PLAN NOTE
"Patient's FSGs are controlled on current medication regimen.  Last A1c reviewed-   Lab Results   Component Value Date    HGBA1C 5.2 10/27/2023     Most recent fingerstick glucose reviewed- No results for input(s): "POCTGLUCOSE" in the last 24 hours.  Current correctional scale  High  Maintain anti-hyperglycemic dose as follows-   Antihyperglycemics (From admission, onward)      Start     Stop Route Frequency Ordered    03/14/24 2139  insulin aspart U-100 pen 0-5 Units         -- SubQ Before meals & nightly PRN 03/14/24 2040          Hold Oral hypoglycemics while patient is in the hospital.  "

## 2024-03-15 NOTE — H&P
[facility] - Emergency Dept  [facility]  History & Physical    Patient Name: Jacoby Jean-Baptiste  MRN: [id number]  Patient Class: OP- Observation  Admission Date: 3/14/2024  Attending Physician: Kasi Duncan MD   Primary Care Provider: Hunter Aguilar III, MD         Patient information was obtained from patient and ER records.     Subjective:     Principal Problem:<principal problem not specified>    Chief Complaint:   Chief Complaint   Patient presents with    Rectal Bleeding    Diarrhea        HPI: 49 year old with a past medicine history of ulcertive colitis, DM, obesity, HLD, HTN, C diff whom presents to the emergency department with reported bloody diarrhea over last 4 days  with associated nausea, vomiting, worsening abdominal pain patient has a history of ulcerative colitis who history of C diff in the past he sees Dr. Adama giron he receives regular infusions and is on budesonide on a daily basis as well despite these medications his symptoms have flared he denies any recorded fever no urinary symptoms reported. Patient reports having 4-6 bloody diarrhea stools a day, on daily prednisone with no relief.   In ER, Dilaudid, zofran and IV fluids started, WBC 15, CT abdomen with colitis-proctitis of the distal descending colon, sigmoid colon and rectum, nonspecific but likely of an acute infectious etiology.Admit to hospital medicine , IV solumedrol TID. Waiting stool panel and rule out c diff before starting abx.     Past Medical History:   Diagnosis Date    C. difficile colitis     Cavitary pneumonia 11/2023    pos aspergillus serology    Diabetes mellitus 01/2022    Hyperlipidemia 2015    Hypertension 2015    Insomnia 2015    Ulcerative colitis        Past Surgical History:   Procedure Laterality Date    BRONCHOSCOPY WITH FLUOROSCOPY Left 11/20/2023    Procedure: BRONCHOSCOPY, WITH FLUOROSCOPY;  Surgeon: Lisa Villarreal MD;  Location: [facility];  Service: Pulmonary;  Laterality:  Left;    BRONCHOSCOPY WITH FLUOROSCOPY N/A 11/30/2023    Procedure: BRONCHOSCOPY, WITH FLUOROSCOPY;  Surgeon: Lisa Villarreal MD;  Location: [facility] ENDO;  Service: Pulmonary;  Laterality: N/A;    CARDIAC ELECTROPHYSIOLOGY MAPPING AND ABLATION  2017    SVT    CHOLECYSTECTOMY  2011    COLONOSCOPY N/A 5/3/2022    Procedure: COLONOSCOPY;  Surgeon: Shan Jean III, MD;  Location: [facility] ENDO;  Service: Endoscopy;  Laterality: N/A;    COLONOSCOPY WITH FECAL MICROBIOTA TRANSFER N/A 3/21/2023    Procedure: COLONOSCOPY, WITH FECAL MICROBIOTA TRANSFER;  Surgeon: David Millan MD;  Location: [facility] ENDO;  Service: Endoscopy;  Laterality: N/A;    ESOPHAGOGASTRODUODENOSCOPY N/A 4/24/2023    Procedure: EGD (ESOPHAGOGASTRODUODENOSCOPY);  Surgeon: Shan Jean III, MD;  Location: [facility] ENDO;  Service: Endoscopy;  Laterality: N/A;    GANGLION CYST EXCISION Left 1992    UMBILICAL HERNIA REPAIR  2011    with mesh       Review of patient's allergies indicates:  No Known Allergies    Current Facility-Administered Medications on File Prior to Encounter   Medication    lactated ringers infusion     Current Outpatient Medications on File Prior to Encounter   Medication Sig    busPIRone (BUSPAR) 7.5 MG tablet Take 1 tablet (7.5 mg total) by mouth 3 (three) times daily.    colesevelam (WELCHOL) 625 mg tablet Take 3 tablets (1,875 mg total) by mouth 2 (two) times daily with meals. Please separate from voriconazole (Patient taking differently: Take 1,875 mg by mouth as needed. Please separate from voriconazole)    dicyclomine (BENTYL) 10 MG capsule Take 10 mg by mouth 3 (three) times daily as needed.    diphenhydrAMINE (BENADRYL) 25 mg capsule Take 1 capsule (25 mg total) by mouth every 6 (six) hours as needed for Itching.    LORazepam (ATIVAN) 0.5 MG tablet Take 1 tablet (0.5 mg total) by mouth every 6 (six) hours as needed for Anxiety.    metFORMIN (GLUCOPHAGE-XR) 500 MG ER 24hr tablet Take 1 tablet (500 mg total) by mouth 2  (two) times daily with meals.    metoprolol succinate (TOPROL-XL) 50 MG 24 hr tablet Take 1 tablet (50 mg total) by mouth once daily.    pantoprazole (PROTONIX) 40 MG tablet Take 1 tablet (40 mg total) by mouth 2 (two) times daily.    predniSONE (DELTASONE) 20 MG tablet Take 2 tablets (40 mg total) by mouth once daily.    promethazine (PHENERGAN) 25 MG tablet Take 25 mg by mouth 4 (four) times daily as needed for Nausea.    semaglutide (OZEMPIC) 1 mg/dose (4 mg/3 mL) Inject 1 mg into the skin every 7 days.    sertraline (ZOLOFT) 100 MG tablet Take 1 tablet (100 mg total) by mouth once daily.    varenicline (CHANTIX) 1 mg Tab Take 1 tablet (1 mg total) by mouth 2 (two) times daily.    zolpidem (AMBIEN) 10 mg Tab Take 1 tablet (10 mg total) by mouth nightly as needed (insomnia).     Family History       Problem Relation (Age of Onset)    Asthma Mother          Tobacco Use    Smoking status: Former     Current packs/day: 1.00     Average packs/day: 1 pack/day for 31.2 years (31.2 ttl pk-yrs)     Types: Cigarettes     Start date: 1993    Smokeless tobacco: Never    Tobacco comments:     Pt states he has stopped smoking with Chantix three weeks ago. 12/1/23   Substance and Sexual Activity    Alcohol use: Yes     Comment: ocass    Drug use: Not Currently    Sexual activity: Not Currently     Review of Systems   Gastrointestinal:  Positive for abdominal distention, abdominal pain, blood in stool, diarrhea, nausea, rectal pain and vomiting.   All other systems reviewed and are negative.    Objective:     Vital Signs (Most Recent):  Temp: 98.5 °F (36.9 °C) (03/14/24 1353)  Pulse: 93 (03/14/24 1833)  Resp: 18 (03/14/24 1820)  BP: (!) 143/64 (03/14/24 1833)  SpO2: 97 % (03/14/24 1833) Vital Signs (24h Range):  Temp:  [98.5 °F (36.9 °C)] 98.5 °F (36.9 °C)  Pulse:  [92-98] 93  Resp:  [18] 18  SpO2:  [96 %-98 %] 97 %  BP: (125-143)/(64-80) 143/64     Weight: 122.5 kg (270 lb)  Body mass index is 44.93 kg/m².     Physical  Exam  Vitals reviewed.   Constitutional:       General: He is not in acute distress.     Appearance: Normal appearance. He is obese. He is not ill-appearing.   HENT:      Head: Normocephalic and atraumatic.      Mouth/Throat:      Mouth: Mucous membranes are moist.   Eyes:      Extraocular Movements: Extraocular movements intact.      Pupils: Pupils are equal, round, and reactive to light.   Cardiovascular:      Rate and Rhythm: Normal rate and regular rhythm.      Pulses: Normal pulses.      Heart sounds: Normal heart sounds. No murmur heard.  Pulmonary:      Effort: Pulmonary effort is normal.      Breath sounds: Normal breath sounds.   Abdominal:      General: Bowel sounds are normal. There is distension.      Palpations: Abdomen is soft.      Tenderness: There is abdominal tenderness.   Musculoskeletal:         General: Normal range of motion.      Cervical back: Normal range of motion and neck supple.   Skin:     General: Skin is warm.      Capillary Refill: Capillary refill takes less than 2 seconds.   Neurological:      General: No focal deficit present.      Mental Status: He is alert and oriented to person, place, and time. Mental status is at baseline.   Psychiatric:         Mood and Affect: Mood normal.              CRANIAL NERVES     CN III, IV, VI   Pupils are equal, round, and reactive to light.       Significant Labs: All pertinent labs within the past 24 hours have been reviewed.  Recent Lab Results         03/14/24  1526        Albumin 3.4       ALP 84       ALT 19       Anion Gap 9       AST 8       Baso # 0.11       Basophil % 0.7       BILIRUBIN TOTAL 0.3  Comment: For infants and newborns, interpretation of results should be based  on gestational age, weight and in agreement with clinical  observations.    Premature Infant recommended reference ranges:  Up to 24 hours.............<8.0 mg/dL  Up to 48 hours............<12.0 mg/dL  3-5 days..................<15.0 mg/dL  6-29  days.................<15.0 mg/dL         BUN 21       Calcium 9.0       Chloride 105       CO2 24       Creatinine 1.1       Differential Method Automated       eGFR >60.0       Eos # 0.1       Eos % 0.8       Glucose 92       Gran # (ANC) 11.1       Gran % 74.0       Hematocrit 38.3       Hemoglobin 12.4       Immature Grans (Abs) 0.30  Comment: Mild elevation in immature granulocytes is non specific and   can be seen in a variety of conditions including stress response,   acute inflammation, trauma and pregnancy. Correlation with other   laboratory and clinical findings is essential.         Immature Granulocytes 2.0       Lipase <3       Lymph # 1.5       Lymph % 10.2       MCH 31.8       MCHC 32.4       MCV 98       Mono # 1.9       Mono % 12.3       MPV 9.7       nRBC 0       Platelet Count 398       Potassium 4.0       PROTEIN TOTAL 7.3       RBC 3.90       RDW 14.1       Sodium 138       WBC 15.04               Significant Imaging: I have reviewed all pertinent imaging results/findings within the past 24 hours.    CT Abdomen Pelvis With IV Contrast NO Oral Contrast (Final result)  Result time 03/14/24 16:46:11            Final result by Carl Macias MD (03/14/24 16:46:11)                           Narrative:     CMS MANDATED QUALITY DATA-CT RADIATION DOSE-436  All CT scans at this facility use dose modulation, iterative reconstruction, and or weight-based dosing when appropriate to reduce radiation dose to as low as reasonably achievable. Unless otherwise stated, incidental findings do not require dedicated follow-up imaging.     HISTORY: Abdominal pain, acute, nonlocalized  rectal bleeding, diarrhea.     FINDINGS: Axial postcontrast imaging was performed with 100 mL Omnipaque 350 IV contrast. Comparison to the CT of 02/13/2024.     CT ABDOMEN: The lung bases are clear. There are cholecystectomy clips, with the liver enhancing normally. The spleen is normal in size and enhances normally, with the  pancreas, adrenal glands and kidneys enhancing normally. There are bilateral nonobstructing renal calculi measuring up to 6 mm on the left, with no ureteral calculi or hydroureteronephrosis.     Scattered calcified plaque involves normal caliber abdominal aorta, with no aortic dissection or aneurysm. No enlarged mesenteric or retroperitoneal lymph nodes. There is a duodenal diverticulum arising from second portion. No small bowel wall thickening or small bowel obstruction.     The appendix is normal. There is long segment circumferential wall thickening, mucosal hyperenhancement and pericolonic fat stranding involving the descending colon, sigmoid colon and rectum, similar to the prior exam. There is no pericolonic rim-enhancing fluid collection or extraluminal free air.     CT PELVIS: Long segment wall thickening, mucosal hyperenhancement and pericolic fat stranding involves the sigmoid colon and rectum, with no rim-enhancing fluid collections. The urinary bladder and pelvic vasculature enhance normally, with rim calcified nodule in the anterior metallic fat, nonspecific but unchanged.     There are no enlarged pelvic or inguinal lymph nodes. The extraperitoneal soft tissues enhance normally. There are no acute fractures or destructive osseous lesions, with intervertebral disc space narrowing and facet arthropathy in the spine. There is degenerative narrowing of the hip joint spaces.     IMPRESSION:  1. Findings compatible with colitis-proctitis of the distal descending colon, sigmoid colon and rectum, nonspecific but likely of an acute infectious etiology.  2. Additional observations as described.     Electronically signed by:  Carl Macias MD  03/14/2024 04:46 PM CDT Workstation: [id number]               Assessment/Plan:     Ulcerative colitis  Multiple bloody diarrhea daily  Trend h/h  IV solumedrol 60 mg TID  IV fluids  NPO  Pain control  CT abdomen: colitis-proctitis of the distal descending colon, sigmoid  "colon and rectum, nonspecific but likely of an acute infectious etiology.      Leukocytosis    WBC 15.04  Hold abx coverage for now until stool  panel returns.   Trend.   Patient on home prednisone, start IV solumderol TID  Lactic acid level pending    Type 2 diabetes mellitus without complication, without long-term current use of insulin  Patient's FSGs are controlled on current medication regimen.  Last A1c reviewed-   Lab Results   Component Value Date    HGBA1C 5.2 10/27/2023     Most recent fingerstick glucose reviewed- No results for input(s): "POCTGLUCOSE" in the last 24 hours.  Current correctional scale  High  Maintain anti-hyperglycemic dose as follows-   Antihyperglycemics (From admission, onward)      Start     Stop Route Frequency Ordered    03/14/24 2139  insulin aspart U-100 pen 0-5 Units         -- SubQ Before meals & nightly PRN 03/14/24 2040          Hold Oral hypoglycemics while patient is in the hospital.    Morbid obesity  Body mass index is 44.93 kg/m². Morbid obesity complicates all aspects of disease management from diagnostic modalities to treatment. Weight loss encouraged and health benefits explained to patient.           VTE Risk Mitigation (From admission, onward)           Ordered     Reason for No Pharmacological VTE Prophylaxis  Once        Question:  Reasons:  Answer:  Active Bleeding    03/14/24 2040     IP VTE HIGH RISK PATIENT  Once         03/14/24 2040     Place sequential compression device  Until discontinued         03/14/24 2040                         On 03/14/2024, patient should be placed in hospital observation services under my care in collaboration with Dr. Duncan.           Jillian Lee NP  Department of Hospital Medicine  [facility] - Emergency Dept          "

## 2024-03-15 NOTE — CONSULTS
GASTROENTEROLOGY INPATIENT CONSULT NOTE  Patient Name: Jacoby Jean-Baptiste  Patient MRN: [id number]  Patient : 1975    Admit Date: 3/14/2024  Service date: 3/15/2024    Reason for Consult: colitis    PCP: Hunter Aguilar III, MD    Chief Complaint   Patient presents with    Rectal Bleeding    Diarrhea       HPI:  Patient is a 49 y.o. male with PMHx  hypertension diabetes on Ozempic hypertension hyperlipidemia sleep apnea umbilical hernia repair and ulcerative colitis.  Patient was diagnosed with ulcerative colitis in  he was started on Remicade.  Two thousand twenty-three he had a prolonged course with recurrent Clostridium difficile which ultimately required vancomycin and re Byotaa enemas and fecal transplant.  Due to bilateral pneumonia on Remicade patient had to be switched to Entyvio and he is status post 3rd dose of Entyvio.  Patient was hospitalized here 4 weeks ago with concern for C diff and UC flare.  He has been on budesonide support since that time.    Patient reports benefit after Entyvio infusion that did last for 3 weeks but this past week had resurgence of frequent diarrhea  Cdiff is negative.     CHART REVIEW:   Hospitalization ; CT  - fatty liver; L sided colitis; Calpro back up ot 3070 (201 on remicade); Neg C. diff  Entyvio started   Hospitalization  - cavitary fungal lesions  Labs 10/'23 - C.diff negative after vanco and rebyota enemas.  Labs  -  Calprotectin 201 on remicade w/ C. diff...tx w/ vanco w/ long taper +/- rebyota; Nml CRP much better on remicade  EGD  - candidate esophagitis / duodenitis; HP neg gastirits  Hospitalization  - colitis on CT; C. diff neg; Remicade loaded  Colon 3/'23 - Pan-colitis; Nml TI; s/p FMT for refractory C. diff  C. DIff + 3/'23  Labs  - CRP 21; Stool PCR + C. DIff....  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 difficile colitis     Cavitary pneumonia 11/2023    pos aspergillus serology    Diabetes mellitus 01/2022    Hyperlipidemia 2015    Hypertension 2015    Insomnia 2015    Ulcerative colitis         Past Surgical History:  Past Surgical History:   Procedure Laterality Date    BRONCHOSCOPY WITH FLUOROSCOPY Left 11/20/2023    Procedure: BRONCHOSCOPY, WITH FLUOROSCOPY;  Surgeon: Lisa Villarreal MD;  Location: [facility];  Service: Pulmonary;  Laterality: Left;    BRONCHOSCOPY WITH FLUOROSCOPY N/A 11/30/2023    Procedure: BRONCHOSCOPY, WITH FLUOROSCOPY;  Surgeon: Lisa Villarreal MD;  Location: [facility];  Service: Pulmonary;  Laterality: N/A;    CARDIAC ELECTROPHYSIOLOGY MAPPING AND ABLATION  2017    SVT    CHOLECYSTECTOMY  2011    COLONOSCOPY N/A 5/3/2022    Procedure: COLONOSCOPY;  Surgeon: Shan Jean III, MD;  Location: [facility];  Service: Endoscopy;  Laterality: N/A;    COLONOSCOPY WITH FECAL MICROBIOTA TRANSFER N/A 3/21/2023    Procedure: COLONOSCOPY, WITH FECAL MICROBIOTA TRANSFER;  Surgeon: David Millan MD;  Location: [facility];  Service: Endoscopy;  Laterality: N/A;    ESOPHAGOGASTRODUODENOSCOPY N/A 4/24/2023    Procedure: EGD (ESOPHAGOGASTRODUODENOSCOPY);  Surgeon: Shan Jean III, MD;  Location: [facility];  Service: Endoscopy;  Laterality: N/A;    GANGLION CYST EXCISION Left 1992    UMBILICAL HERNIA REPAIR  2011    with mesh        Home Medications:  Medications Prior to Admission   Medication Sig Dispense Refill Last Dose    busPIRone (BUSPAR) 7.5 MG tablet Take 1 tablet (7.5 mg total) by mouth 3 (three) times daily. 270 tablet 1 3/14/2024    dicyclomine (BENTYL) 10 MG capsule Take 10 mg by mouth 3 (three) times daily as needed.   Past Week    LORazepam (ATIVAN) 0.5 MG tablet Take 1 tablet (0.5 mg total) by mouth every 6 (six) hours as needed for Anxiety. 60 tablet 2 Past Week    metFORMIN  (GLUCOPHAGE-XR) 500 MG ER 24hr tablet Take 1 tablet (500 mg total) by mouth 2 (two) times daily with meals. 180 tablet 1 3/14/2024    metoprolol succinate (TOPROL-XL) 50 MG 24 hr tablet Take 1 tablet (50 mg total) by mouth once daily. 90 tablet 1 3/14/2024    pantoprazole (PROTONIX) 40 MG tablet Take 1 tablet (40 mg total) by mouth 2 (two) times daily. 180 tablet 3 3/14/2024    [] predniSONE (DELTASONE) 20 MG tablet Take 2 tablets (40 mg total) by mouth once daily. 60 tablet 0 3/14/2024    promethazine (PHENERGAN) 25 MG tablet Take 25 mg by mouth 4 (four) times daily as needed for Nausea.   Past Week    sertraline (ZOLOFT) 100 MG tablet Take 1 tablet (100 mg total) by mouth once daily. 90 tablet 1 3/14/2024    simvastatin (ZOCOR) 20 MG tablet Take 20 mg by mouth every evening.   3/13/2024    varenicline (CHANTIX) 1 mg Tab Take 1 tablet (1 mg total) by mouth 2 (two) times daily. 180 tablet 1 3/14/2024    zolpidem (AMBIEN) 10 mg Tab Take 1 tablet (10 mg total) by mouth nightly as needed (insomnia). 30 tablet 2 Past Week    colesevelam (WELCHOL) 625 mg tablet Take 3 tablets (1,875 mg total) by mouth 2 (two) times daily with meals. Please separate from voriconazole (Patient not taking: Reported on 3/14/2024) 180 tablet 0 Not Taking    semaglutide (OZEMPIC) 1 mg/dose (4 mg/3 mL) Inject 1 mg into the skin every 7 days. (Patient taking differently: Inject 1 mg into the skin every 7 days. ) 3 each 1 3/9/2024       Inpatient Medications:   methylPREDNISolone sodium succinate injection  60 mg Intravenous Q8H    metoprolol succinate  50 mg Oral Daily    pantoprazole  40 mg Oral BID     acetaminophen, aluminum-magnesium hydroxide-simethicone, dextrose 50% in water (D50W), dextrose 50% in water (D50W), glucagon (human recombinant), glucose, glucose, HYDROcodone-acetaminophen, HYDROmorphone, insulin aspart U-100, magnesium oxide, magnesium oxide, melatonin, ondansetron, potassium bicarbonate, potassium  "bicarbonate, potassium bicarbonate, potassium, sodium phosphates, potassium, sodium phosphates, potassium, sodium phosphates, sodium chloride 0.9%, sodium chloride 0.9%, sodium chloride 0.9%, zolpidem    Review of patient's allergies indicates:  No Known Allergies    Social History:   Social History     Occupational History    Not on file   Tobacco Use    Smoking status: Former     Current packs/day: 1.00     Average packs/day: 1 pack/day for 31.2 years (31.2 ttl pk-yrs)     Types: Cigarettes     Start date: 1993    Smokeless tobacco: Never    Tobacco comments:     Pt states he has stopped smoking with Chantix three weeks ago. 12/1/23   Substance and Sexual Activity    Alcohol use: Yes     Comment: ocass    Drug use: Not Currently    Sexual activity: Not Currently       Family History:   Family History   Problem Relation Age of Onset    Asthma Mother        Review of Systems:  A 10 point review of systems was performed and was normal, except as mentioned in the HPI, including constitutional, HEENT, heme, lymph, cardiovascular, respiratory, gastrointestinal, genitourinary, neurologic, endocrine, psychiatric and musculoskeletal.      OBJECTIVE:    Physical Exam:  24 Hour Vital Sign Ranges: Temp:  [97.6 °F (36.4 °C)-98.4 °F (36.9 °C)] 98.4 °F (36.9 °C)  Pulse:  [84-97] 84  Resp:  [15-19] 18  SpO2:  [92 %-98 %] 96 %  BP: (123-164)/(60-89) 133/63  Most recent vitals: /63 (BP Location: Left arm, Patient Position: Lying)   Pulse 84   Temp 98.4 °F (36.9 °C) (Oral)   Resp 18   Ht 5' 5" (1.651 m)   Wt 124.9 kg (275 lb 4.8 oz)   SpO2 96%   BMI 45.81 kg/m²    GEN: well-developed, well-nourished, awake and alert, non-toxic appearing adult  HEENT: PERRL, sclera anicteric, oral mucosa pink and moist without lesion  NECK: trachea midline; Good ROM  CV: regular rate and rhythm, no murmurs or gallops  RESP: clear to auscultation bilaterally, no wheezes, rhonci or rales  ABD: soft, non-tender, non-distended, normal bowel " "sounds  EXT: no swelling or edema, 2+ pulses distally  SKIN: no rashes or jaundice  PSYCH: normal affect    Labs:   Recent Labs     03/14/24  1526 03/15/24  0737   WBC 15.04* 13.34*   MCV 98 101*    387     Recent Labs     03/14/24  1526 03/15/24  0737    138   K 4.0 4.1    105   CO2 24 23   BUN 21* 18   GLU 92 103     No results for input(s): "ALB" in the last 72 hours.    Invalid input(s): "ALKP", "SGOT", "SGPT", "TBIL", "DBIL", "TPRO"  No results for input(s): "PT", "INR", "PTT" in the last 72 hours.      Radiology Review:  CT Abdomen Pelvis With IV Contrast NO Oral Contrast   Final Result        IMPRESSION:  1. Findings compatible with colitis-proctitis of the distal descending colon, sigmoid colon and rectum, nonspecific but likely of an acute infectious etiology.  2. Additional observations as described.    IMPRESSION / RECOMMENDATIONS:  49-year-old gentleman with a history of severe ulcerative colitis and C diff. current C diff is negative.  Patient's initial biologic was Remicade but had to be discontinued due to opportunistic infection with Aspergillus pneumonia.  He has had his 3 induction doses of Entyvio and did receive some improvement in symptoms.  Most likely he will need maintenance Q 4 week dosing which would be scheduled for next week.    We will repeat fecal calprotectin to monitor trend  Schedule colonoscopy in a.m. to assess disease.  Schedule Entyvio repeat for next week and then q.4 weeks.  Continue budesonide as concern for systemic steroid use    Draw kourtney and vedolizumab levels and ab    Thank you for this consult.    Odalis Mccabe  3/15/2024  2:50 PM       "

## 2024-03-15 NOTE — PLAN OF CARE
Problem: Adult Inpatient Plan of Care  Goal: Readiness for Transition of Care  Outcome: Ongoing, Progressing     Problem: Diabetes Comorbidity  Goal: Blood Glucose Level Within Targeted Range  Outcome: Ongoing, Progressing     Problem: Infection  Goal: Absence of Infection Signs and Symptoms  Outcome: Ongoing, Progressing

## 2024-03-15 NOTE — SUBJECTIVE & OBJECTIVE
Past Medical History:   Diagnosis Date    C. difficile colitis     Cavitary pneumonia 11/2023    pos aspergillus serology    Diabetes mellitus 01/2022    Hyperlipidemia 2015    Hypertension 2015    Insomnia 2015    Ulcerative colitis        Past Surgical History:   Procedure Laterality Date    BRONCHOSCOPY WITH FLUOROSCOPY Left 11/20/2023    Procedure: BRONCHOSCOPY, WITH FLUOROSCOPY;  Surgeon: Lisa Villarreal MD;  Location: [facility] ENDO;  Service: Pulmonary;  Laterality: Left;    BRONCHOSCOPY WITH FLUOROSCOPY N/A 11/30/2023    Procedure: BRONCHOSCOPY, WITH FLUOROSCOPY;  Surgeon: Lisa Villarreal MD;  Location: [facility] ENDO;  Service: Pulmonary;  Laterality: N/A;    CARDIAC ELECTROPHYSIOLOGY MAPPING AND ABLATION  2017    SVT    CHOLECYSTECTOMY  2011    COLONOSCOPY N/A 5/3/2022    Procedure: COLONOSCOPY;  Surgeon: Shan Jean III, MD;  Location: [facility];  Service: Endoscopy;  Laterality: N/A;    COLONOSCOPY WITH FECAL MICROBIOTA TRANSFER N/A 3/21/2023    Procedure: COLONOSCOPY, WITH FECAL MICROBIOTA TRANSFER;  Surgeon: David Millan MD;  Location: [facility];  Service: Endoscopy;  Laterality: N/A;    ESOPHAGOGASTRODUODENOSCOPY N/A 4/24/2023    Procedure: EGD (ESOPHAGOGASTRODUODENOSCOPY);  Surgeon: Shan Jean III, MD;  Location: [facility];  Service: Endoscopy;  Laterality: N/A;    GANGLION CYST EXCISION Left 1992    UMBILICAL HERNIA REPAIR  2011    with mesh       Review of patient's allergies indicates:  No Known Allergies    Current Facility-Administered Medications on File Prior to Encounter   Medication    lactated ringers infusion     Current Outpatient Medications on File Prior to Encounter   Medication Sig    busPIRone (BUSPAR) 7.5 MG tablet Take 1 tablet (7.5 mg total) by mouth 3 (three) times daily.    colesevelam (WELCHOL) 625 mg tablet Take 3 tablets (1,875 mg total) by mouth 2 (two) times daily with meals. Please separate from voriconazole (Patient taking differently: Take 1,875 mg by  mouth as needed. Please separate from voriconazole)    dicyclomine (BENTYL) 10 MG capsule Take 10 mg by mouth 3 (three) times daily as needed.    diphenhydrAMINE (BENADRYL) 25 mg capsule Take 1 capsule (25 mg total) by mouth every 6 (six) hours as needed for Itching.    LORazepam (ATIVAN) 0.5 MG tablet Take 1 tablet (0.5 mg total) by mouth every 6 (six) hours as needed for Anxiety.    metFORMIN (GLUCOPHAGE-XR) 500 MG ER 24hr tablet Take 1 tablet (500 mg total) by mouth 2 (two) times daily with meals.    metoprolol succinate (TOPROL-XL) 50 MG 24 hr tablet Take 1 tablet (50 mg total) by mouth once daily.    pantoprazole (PROTONIX) 40 MG tablet Take 1 tablet (40 mg total) by mouth 2 (two) times daily.    predniSONE (DELTASONE) 20 MG tablet Take 2 tablets (40 mg total) by mouth once daily.    promethazine (PHENERGAN) 25 MG tablet Take 25 mg by mouth 4 (four) times daily as needed for Nausea.    semaglutide (OZEMPIC) 1 mg/dose (4 mg/3 mL) Inject 1 mg into the skin every 7 days.    sertraline (ZOLOFT) 100 MG tablet Take 1 tablet (100 mg total) by mouth once daily.    varenicline (CHANTIX) 1 mg Tab Take 1 tablet (1 mg total) by mouth 2 (two) times daily.    zolpidem (AMBIEN) 10 mg Tab Take 1 tablet (10 mg total) by mouth nightly as needed (insomnia).     Family History       Problem Relation (Age of Onset)    Asthma Mother          Tobacco Use    Smoking status: Former     Current packs/day: 1.00     Average packs/day: 1 pack/day for 31.2 years (31.2 ttl pk-yrs)     Types: Cigarettes     Start date: 1993    Smokeless tobacco: Never    Tobacco comments:     Pt states he has stopped smoking with Chantix three weeks ago. 12/1/23   Substance and Sexual Activity    Alcohol use: Yes     Comment: ocass    Drug use: Not Currently    Sexual activity: Not Currently     Review of Systems   Gastrointestinal:  Positive for abdominal distention, abdominal pain, blood in stool, diarrhea, nausea, rectal pain and vomiting.   All other  systems reviewed and are negative.    Objective:     Vital Signs (Most Recent):  Temp: 98.5 °F (36.9 °C) (03/14/24 1353)  Pulse: 93 (03/14/24 1833)  Resp: 18 (03/14/24 1820)  BP: (!) 143/64 (03/14/24 1833)  SpO2: 97 % (03/14/24 1833) Vital Signs (24h Range):  Temp:  [98.5 °F (36.9 °C)] 98.5 °F (36.9 °C)  Pulse:  [92-98] 93  Resp:  [18] 18  SpO2:  [96 %-98 %] 97 %  BP: (125-143)/(64-80) 143/64     Weight: 122.5 kg (270 lb)  Body mass index is 44.93 kg/m².     Physical Exam  Vitals reviewed.   Constitutional:       General: He is not in acute distress.     Appearance: Normal appearance. He is obese. He is not ill-appearing.   HENT:      Head: Normocephalic and atraumatic.      Mouth/Throat:      Mouth: Mucous membranes are moist.   Eyes:      Extraocular Movements: Extraocular movements intact.      Pupils: Pupils are equal, round, and reactive to light.   Cardiovascular:      Rate and Rhythm: Normal rate and regular rhythm.      Pulses: Normal pulses.      Heart sounds: Normal heart sounds. No murmur heard.  Pulmonary:      Effort: Pulmonary effort is normal.      Breath sounds: Normal breath sounds.   Abdominal:      General: Bowel sounds are normal. There is distension.      Palpations: Abdomen is soft.      Tenderness: There is abdominal tenderness.   Musculoskeletal:         General: Normal range of motion.      Cervical back: Normal range of motion and neck supple.   Skin:     General: Skin is warm.      Capillary Refill: Capillary refill takes less than 2 seconds.   Neurological:      General: No focal deficit present.      Mental Status: He is alert and oriented to person, place, and time. Mental status is at baseline.   Psychiatric:         Mood and Affect: Mood normal.              CRANIAL NERVES     CN III, IV, VI   Pupils are equal, round, and reactive to light.       Significant Labs: All pertinent labs within the past 24 hours have been reviewed.  Recent Lab Results         03/14/24  1526        Albumin  3.4       ALP 84       ALT 19       Anion Gap 9       AST 8       Baso # 0.11       Basophil % 0.7       BILIRUBIN TOTAL 0.3  Comment: For infants and newborns, interpretation of results should be based  on gestational age, weight and in agreement with clinical  observations.    Premature Infant recommended reference ranges:  Up to 24 hours.............<8.0 mg/dL  Up to 48 hours............<12.0 mg/dL  3-5 days..................<15.0 mg/dL  6-29 days.................<15.0 mg/dL         BUN 21       Calcium 9.0       Chloride 105       CO2 24       Creatinine 1.1       Differential Method Automated       eGFR >60.0       Eos # 0.1       Eos % 0.8       Glucose 92       Gran # (ANC) 11.1       Gran % 74.0       Hematocrit 38.3       Hemoglobin 12.4       Immature Grans (Abs) 0.30  Comment: Mild elevation in immature granulocytes is non specific and   can be seen in a variety of conditions including stress response,   acute inflammation, trauma and pregnancy. Correlation with other   laboratory and clinical findings is essential.         Immature Granulocytes 2.0       Lipase <3       Lymph # 1.5       Lymph % 10.2       MCH 31.8       MCHC 32.4       MCV 98       Mono # 1.9       Mono % 12.3       MPV 9.7       nRBC 0       Platelet Count 398       Potassium 4.0       PROTEIN TOTAL 7.3       RBC 3.90       RDW 14.1       Sodium 138       WBC 15.04               Significant Imaging: I have reviewed all pertinent imaging results/findings within the past 24 hours.    CT Abdomen Pelvis With IV Contrast NO Oral Contrast (Final result)  Result time 03/14/24 16:46:11            Final result by Carl Macias MD (03/14/24 16:46:11)                           Narrative:     CMS MANDATED QUALITY DATA-CT RADIATION DOSE-436  All CT scans at this facility use dose modulation, iterative reconstruction, and or weight-based dosing when appropriate to reduce radiation dose to as low as reasonably achievable. Unless otherwise stated,  incidental findings do not require dedicated follow-up imaging.     HISTORY: Abdominal pain, acute, nonlocalized  rectal bleeding, diarrhea.     FINDINGS: Axial postcontrast imaging was performed with 100 mL Omnipaque 350 IV contrast. Comparison to the CT of 02/13/2024.     CT ABDOMEN: The lung bases are clear. There are cholecystectomy clips, with the liver enhancing normally. The spleen is normal in size and enhances normally, with the pancreas, adrenal glands and kidneys enhancing normally. There are bilateral nonobstructing renal calculi measuring up to 6 mm on the left, with no ureteral calculi or hydroureteronephrosis.     Scattered calcified plaque involves normal caliber abdominal aorta, with no aortic dissection or aneurysm. No enlarged mesenteric or retroperitoneal lymph nodes. There is a duodenal diverticulum arising from second portion. No small bowel wall thickening or small bowel obstruction.     The appendix is normal. There is long segment circumferential wall thickening, mucosal hyperenhancement and pericolonic fat stranding involving the descending colon, sigmoid colon and rectum, similar to the prior exam. There is no pericolonic rim-enhancing fluid collection or extraluminal free air.     CT PELVIS: Long segment wall thickening, mucosal hyperenhancement and pericolic fat stranding involves the sigmoid colon and rectum, with no rim-enhancing fluid collections. The urinary bladder and pelvic vasculature enhance normally, with rim calcified nodule in the anterior metallic fat, nonspecific but unchanged.     There are no enlarged pelvic or inguinal lymph nodes. The extraperitoneal soft tissues enhance normally. There are no acute fractures or destructive osseous lesions, with intervertebral disc space narrowing and facet arthropathy in the spine. There is degenerative narrowing of the hip joint spaces.     IMPRESSION:  1. Findings compatible with colitis-proctitis of the distal descending colon,  sigmoid colon and rectum, nonspecific but likely of an acute infectious etiology.  2. Additional observations as described.     Electronically signed by:  Carl Macias MD  03/14/2024 04:46 PM CDT Workstation: [id number]

## 2024-03-15 NOTE — ASSESSMENT & PLAN NOTE
Multiple bloody diarrhea daily  Trend h/h  IV solumedrol 60 mg TID  IV fluids  NPO  Pain control  CT abdomen: colitis-proctitis of the distal descending colon, sigmoid colon and rectum, nonspecific but likely of an acute infectious etiology.

## 2024-03-15 NOTE — PROGRESS NOTES
[facility] Medicine  Progress Note    Patient Name: Jacoby Jean-Baptiste  MRN: [id number]  Patient Class: OP- Observation   Admission Date: 3/14/2024  Length of Stay: 0 days  Attending Physician: Ash Choudhury MD  Primary Care Provider: Hunter Aguilar III, MD        Subjective:     Principal Problem:<principal problem not specified>        HPI:  49 year old with a past medicine history of ulcertive colitis, DM, obesity, HLD, HTN, C diff whom presents to the emergency department with reported bloody diarrhea over last 4 days  with associated nausea, vomiting, worsening abdominal pain patient has a history of ulcerative colitis who history of C diff in the past he sees Dr. Adama giron he receives regular infusions and is on budesonide on a daily basis as well despite these medications his symptoms have flared he denies any recorded fever no urinary symptoms reported. Patient reports having 4-6 bloody diarrhea stools a day, on daily prednisone with no relief.   In ER, Dilaudid, zofran and IV fluids started, WBC 15, CT abdomen with colitis-proctitis of the distal descending colon, sigmoid colon and rectum, nonspecific but likely of an acute infectious etiology.Admit to hospital medicine , IV solumedrol TID. Waiting stool panel and rule out c diff before starting abx.     Overview/Hospital Course:  No notes on file    Interval History:  Patient seen and examined.  No acute events overnight.  C diff pending.  Nausea and vomiting improved.    Review of Systems   Gastrointestinal:  Positive for abdominal pain, blood in stool, diarrhea, nausea and vomiting.   All other systems reviewed and are negative.    Objective:     Vital Signs (Most Recent):  Temp: 97.6 °F (36.4 °C) (03/15/24 0727)  Pulse: 88 (03/15/24 0727)  Resp: 18 (03/15/24 1105)  BP: (!) 142/69 (03/15/24 0727)  SpO2: 95 % (03/15/24 0727) Vital Signs (24h Range):  Temp:  [97.6 °F (36.4 °C)-98.5 °F (36.9 °C)] 97.6 °F (36.4 °C)  Pulse:   [88-98] 88  Resp:  [15-19] 18  SpO2:  [92 %-98 %] 95 %  BP: (123-164)/(60-89) 142/69     Weight: 124.9 kg (275 lb 4.8 oz)  Body mass index is 45.81 kg/m².    Intake/Output Summary (Last 24 hours) at 3/15/2024 1211  Last data filed at 3/15/2024 0800  Gross per 24 hour   Intake 1237.08 ml   Output 850 ml   Net 387.08 ml         Physical Exam  Constitutional:       General: He is not in acute distress.     Appearance: He is obese. He is not ill-appearing, toxic-appearing or diaphoretic.   Cardiovascular:      Rate and Rhythm: Normal rate and regular rhythm.      Pulses: Normal pulses.      Heart sounds: Normal heart sounds.   Pulmonary:      Effort: Pulmonary effort is normal. No respiratory distress.      Breath sounds: Normal breath sounds.   Abdominal:      General: Bowel sounds are normal. There is no distension.      Palpations: Abdomen is soft.      Tenderness: There is abdominal tenderness (generalized ttp). There is no guarding.   Skin:     General: Skin is warm and dry.      Coloration: Skin is not pale.      Findings: No erythema.   Neurological:      Mental Status: He is alert and oriented to person, place, and time. Mental status is at baseline.             Significant Labs: All pertinent labs within the past 24 hours have been reviewed.  CBC:   Recent Labs   Lab 03/14/24  1526 03/15/24  0737   WBC 15.04* 13.34*   HGB 12.4* 12.6*   HCT 38.3* 40.0    387     CMP:   Recent Labs   Lab 03/14/24  1526 03/15/24  0737    138   K 4.0 4.1    105   CO2 24 23   GLU 92 103   BUN 21* 18   CREATININE 1.1 1.0   CALCIUM 9.0 8.8   PROT 7.3 7.0   ALBUMIN 3.4* 3.4*   BILITOT 0.3 0.2   ALKPHOS 84 76   AST 8* 8*   ALT 19 16   ANIONGAP 9 10       Significant Imaging: I have reviewed all pertinent imaging results/findings within the past 24 hours.    Assessment/Plan:      Ulcerative colitis  Multiple bloody diarrhea daily  Trend h/h  IV solumedrol 60 mg TID  IV fluids  NPO  Pain control  CT abdomen:  "colitis-proctitis of the distal descending colon, sigmoid colon and rectum, nonspecific but likely of an acute infectious etiology.  GI consulted    Leukocytosis  WBC 15.04  Hold abx coverage for now until stool  panel returns.   Trend.   Patient on home prednisone, start IV solumderol TID  Lactic acid level normal  Improved today - likely 2/2 to steroids    Type 2 diabetes mellitus without complication, without long-term current use of insulin  Patient's FSGs are controlled on current medication regimen.  Last A1c reviewed-   Lab Results   Component Value Date    HGBA1C 5.2 10/27/2023     Most recent fingerstick glucose reviewed- No results for input(s): "POCTGLUCOSE" in the last 24 hours.  Current correctional scale  High  Maintain anti-hyperglycemic dose as follows-   Antihyperglycemics (From admission, onward)      Start     Stop Route Frequency Ordered    03/14/24 2139  insulin aspart U-100 pen 0-5 Units         -- SubQ Before meals & nightly PRN 03/14/24 2040          Hold Oral hypoglycemics while patient is in the hospital.    Morbid obesity  Body mass index is 44.93 kg/m². Morbid obesity complicates all aspects of disease management from diagnostic modalities to treatment. Weight loss encouraged and health benefits explained to patient.           VTE Risk Mitigation (From admission, onward)           Ordered     Reason for No Pharmacological VTE Prophylaxis  Once        Question:  Reasons:  Answer:  Active Bleeding    03/14/24 2040     IP VTE HIGH RISK PATIENT  Once         03/14/24 2040     Place sequential compression device  Until discontinued         03/14/24 2040                    Discharge Planning   GERARDO: 3/17/2024     Code Status: Full Code   Is the patient medically ready for discharge?:     Reason for patient still in hospital (select all that apply): Patient trending condition, Laboratory test, Treatment, and Consult recommendations  Discharge Plan A: Home                  Rosalind Barrett " NP  Department of Hospital Medicine   [facility]

## 2024-03-15 NOTE — HPI
49 year old with a past medicine history of ulcertive colitis, DM, obesity, HLD, HTN, C diff whom presents to the emergency department with reported bloody diarrhea over last 4 days  with associated nausea, vomiting, worsening abdominal pain patient has a history of ulcerative colitis who history of C diff in the past he sees Dr. Adama giron he receives regular infusions and is on budesonide on a daily basis as well despite these medications his symptoms have flared he denies any recorded fever no urinary symptoms reported. Patient reports having 4-6 bloody diarrhea stools a day, on daily prednisone with no relief.   In ER, Dilaudid, zofran and IV fluids started, WBC 15, CT abdomen with colitis-proctitis of the distal descending colon, sigmoid colon and rectum, nonspecific but likely of an acute infectious etiology.Admit to hospital medicine , IV solumedrol TID. Waiting stool panel and rule out c diff before starting abx.

## 2024-03-15 NOTE — ASSESSMENT & PLAN NOTE
WBC 15.04  Hold abx coverage for now until stool  panel returns.   Trend.   Patient on home prednisone, start IV solumderol TID  Lactic acid level normal  Improved today - likely 2/2 to steroids

## 2024-03-15 NOTE — PHARMACY MED REC
"              .        Admission Medication History     The home medication history was taken by Yu Holloway.    You may go to "Admission" then "Reconcile Home Medications" tabs to review and/or act upon these items.     The home medication list has been updated by the Pharmacy department.   Please read ALL comments highlighted in yellow.   Please address this information as you see fit.    Feel free to contact us if you have any questions or require assistance.        Medications listed below were obtained from: Patient/family and Analytic software- [employer]  Current Facility-Administered Medications on File Prior to Encounter   Medication Dose Route Frequency Provider Last Rate Last Admin    lactated ringers infusion   Intravenous Continuous David Millan MD 75 mL/hr at 03/21/23 1252 New Bag at 03/21/23 1252     Current Outpatient Medications on File Prior to Encounter   Medication Sig Dispense Refill    busPIRone (BUSPAR) 7.5 MG tablet Take 1 tablet (7.5 mg total) by mouth 3 (three) times daily. 270 tablet 1    dicyclomine (BENTYL) 10 MG capsule Take 10 mg by mouth 3 (three) times daily as needed.      LORazepam (ATIVAN) 0.5 MG tablet Take 1 tablet (0.5 mg total) by mouth every 6 (six) hours as needed for Anxiety. 60 tablet 2    metFORMIN (GLUCOPHAGE-XR) 500 MG ER 24hr tablet Take 1 tablet (500 mg total) by mouth 2 (two) times daily with meals. 180 tablet 1    metoprolol succinate (TOPROL-XL) 50 MG 24 hr tablet Take 1 tablet (50 mg total) by mouth once daily. 90 tablet 1    pantoprazole (PROTONIX) 40 MG tablet Take 1 tablet (40 mg total) by mouth 2 (two) times daily. 180 tablet 3    predniSONE (DELTASONE) 20 MG tablet Take 2 tablets (40 mg total) by mouth once daily. 60 tablet 0    promethazine (PHENERGAN) 25 MG tablet Take 25 mg by mouth 4 (four) times daily as needed for Nausea.      sertraline (ZOLOFT) 100 MG tablet Take 1 tablet (100 mg total) by mouth once daily. 90 tablet 1    simvastatin (ZOCOR) 20 " MG tablet Take 20 mg by mouth every evening.      varenicline (CHANTIX) 1 mg Tab Take 1 tablet (1 mg total) by mouth 2 (two) times daily. 180 tablet 1    zolpidem (AMBIEN) 10 mg Tab Take 1 tablet (10 mg total) by mouth nightly as needed (insomnia). 30 tablet 2    colesevelam (WELCHOL) 625 mg tablet Take 3 tablets (1,875 mg total) by mouth 2 (two) times daily with meals. Please separate from voriconazole (Patient not taking: Reported on 3/14/2024) 180 tablet 0    semaglutide (OZEMPIC) 1 mg/dose (4 mg/3 mL) Inject 1 mg into the skin every 7 days. (Patient taking differently: Inject 1 mg into the skin every 7 days. SATURDAYS) 3 each 1    [DISCONTINUED] diphenhydrAMINE (BENADRYL) 25 mg capsule Take 1 capsule (25 mg total) by mouth every 6 (six) hours as needed for Itching. 30 capsule 0       Potential issues to be addressed PRIOR TO DISCHARGE  Patient reported not taking the following medications: (Welchol). These medications remain on the home medication list. Please address accordingly.     Yu Holloway  EXT 1924

## 2024-03-15 NOTE — SUBJECTIVE & OBJECTIVE
Interval History:  Patient seen and examined.  No acute events overnight.  C diff pending.  Nausea and vomiting improved.    Review of Systems   Gastrointestinal:  Positive for abdominal pain, blood in stool, diarrhea, nausea and vomiting.   All other systems reviewed and are negative.    Objective:     Vital Signs (Most Recent):  Temp: 97.6 °F (36.4 °C) (03/15/24 0727)  Pulse: 88 (03/15/24 0727)  Resp: 18 (03/15/24 1105)  BP: (!) 142/69 (03/15/24 0727)  SpO2: 95 % (03/15/24 0727) Vital Signs (24h Range):  Temp:  [97.6 °F (36.4 °C)-98.5 °F (36.9 °C)] 97.6 °F (36.4 °C)  Pulse:  [88-98] 88  Resp:  [15-19] 18  SpO2:  [92 %-98 %] 95 %  BP: (123-164)/(60-89) 142/69     Weight: 124.9 kg (275 lb 4.8 oz)  Body mass index is 45.81 kg/m².    Intake/Output Summary (Last 24 hours) at 3/15/2024 1211  Last data filed at 3/15/2024 0800  Gross per 24 hour   Intake 1237.08 ml   Output 850 ml   Net 387.08 ml         Physical Exam  Constitutional:       General: He is not in acute distress.     Appearance: He is obese. He is not ill-appearing, toxic-appearing or diaphoretic.   Cardiovascular:      Rate and Rhythm: Normal rate and regular rhythm.      Pulses: Normal pulses.      Heart sounds: Normal heart sounds.   Pulmonary:      Effort: Pulmonary effort is normal. No respiratory distress.      Breath sounds: Normal breath sounds.   Abdominal:      General: Bowel sounds are normal. There is no distension.      Palpations: Abdomen is soft.      Tenderness: There is abdominal tenderness (generalized ttp). There is no guarding.   Skin:     General: Skin is warm and dry.      Coloration: Skin is not pale.      Findings: No erythema.   Neurological:      Mental Status: He is alert and oriented to person, place, and time. Mental status is at baseline.             Significant Labs: All pertinent labs within the past 24 hours have been reviewed.  CBC:   Recent Labs   Lab 03/14/24  1526 03/15/24  0737   WBC 15.04* 13.34*   HGB 12.4* 12.6*    HCT 38.3* 40.0    387     CMP:   Recent Labs   Lab 03/14/24  1526 03/15/24  0737    138   K 4.0 4.1    105   CO2 24 23   GLU 92 103   BUN 21* 18   CREATININE 1.1 1.0   CALCIUM 9.0 8.8   PROT 7.3 7.0   ALBUMIN 3.4* 3.4*   BILITOT 0.3 0.2   ALKPHOS 84 76   AST 8* 8*   ALT 19 16   ANIONGAP 9 10       Significant Imaging: I have reviewed all pertinent imaging results/findings within the past 24 hours.

## 2024-03-15 NOTE — ASSESSMENT & PLAN NOTE
Multiple bloody diarrhea daily  Trend h/h  IV solumedrol 60 mg TID  IV fluids  NPO  Pain control  CT abdomen: colitis-proctitis of the distal descending colon, sigmoid colon and rectum, nonspecific but likely of an acute infectious etiology.  GI consulted

## 2024-03-15 NOTE — PLAN OF CARE
Critical access hospital  Initial Discharge Assessment       Primary Care Provider: Hunter Aguilar III, MD    Admission Diagnosis: UC (ulcerative colitis) [K51.90]    Admission Date: 3/14/2024  Expected Discharge Date: 3/17/2024     completed discharge assessment with pt at bedside. Pt's preferred pharmacy is [facility] in [place]. Pt AAOx4s. Pt lives at home alone, but pt states he can call his brother help him at anytime. Demographics, PCP, and insurance verified. No home health. No dialysis. Pt completes ADLs without assistance. Pt to discharge home via personal transport. Pt has no other needs to be addressed at this time.      Transition of Care Barriers: (P) None    Payor: [place] [employer] / Plan: [place] [employer] (UMR) / Product Type: Commercial /     Extended Emergency Contact Information  Primary Emergency Contact: Estiven (Sister-In-Law)Andrea  Address: [address] [place] [address] [facility]  Home Phone: [phone]  Mobile Phone: [phone]  Relation: Sister  Preferred language: English   needed? No  Secondary Emergency Contact: Dallas Jean-Baptiste   [place]  Mobile Phone: [phone]  Relation: Brother  Preferred language: English   needed? No    Discharge Plan A: (P) Home  Discharge Plan B: (P) Home      [id number] - [place], [place] - [address] [facility] [address]  [address]  [place] [address]  Phone: [phone] Fax: [phone]    [facility] - [place], [place] - [address] [facility]  [address] [facility] [address]  Phone: [phone] Fax: [phone]    [facility]  [address]  [facility] [address]  Phone: [phone] Fax: [phone]      Initial Assessment (most recent)       Adult Discharge Assessment - 03/15/24 0913          Discharge Assessment    Assessment Type Discharge Planning Assessment      Confirmed/corrected address, phone number and insurance Yes     Confirmed Demographics Correct on Facesheet     Source of Information patient     When was your last doctors appointment? --   January 2024    Reason For Admission UC (ulcerative colitis)     People in Home alone     Facility Arrived From: Home     Do you expect to return to your current living situation? Yes     Do you have help at home or someone to help you manage your care at home? Yes     Who are your caregiver(s) and their phone number(s)? Dallas Jean-Baptiste (Brother) [phone]     Prior to hospitilization cognitive status: Alert/Oriented     Current cognitive status: Alert/Oriented     Walking or Climbing Stairs Difficulty no     Dressing/Bathing Difficulty no     Home Layout Able to live on 1st floor     Equipment Currently Used at Home none     Readmission within 30 days? No (P)      Patient currently being followed by outpatient case management? No (P)      Do you currently have service(s) that help you manage your care at home? No (P)      Do you take prescription medications? Yes (P)      Do you have prescription coverage? Yes (P)      Coverage [employer] [place] [employer] ([employer]) (P)      Do you have any problems affording any of your prescribed medications? No (P)      Is the patient taking medications as prescribed? yes (P)      Who is going to help you get home at discharge? Dallas Jean-Baptiste (Brother) [phone] (P)      How do you get to doctors appointments? car, drives self (P)      Are you on dialysis? No (P)      Do you take coumadin? No (P)      Discharge Plan A Home (P)      Discharge Plan B Home (P)      DME Needed Upon Discharge  none (P)      Discharge Plan discussed with: Patient (P)      Transition of Care Barriers None (P)

## 2024-03-15 NOTE — NURSING
Nurses Note -- 4 Eyes      3/14/2024   10:18 PM      Skin assessed during: Admit      [x] No Altered Skin Integrity Present    [x]Prevention Measures Documented      [] Yes- Altered Skin Integrity Present or Discovered   [] LDA Added if Not in Epic (Describe Wound)   [] New Altered Skin Integrity was Present on Admit and Documented in LDA   [] Wound Image Taken    Wound Care Consulted? No    Attending Nurse:  Amarilys Wang RN/Staff Member:  BROOKE Venegas

## 2024-03-16 ENCOUNTER — ANESTHESIA EVENT (OUTPATIENT)
Dept: SURGERY | Facility: HOSPITAL | Age: 49
DRG: 386 | End: 2024-03-16
Payer: COMMERCIAL

## 2024-03-16 ENCOUNTER — ANESTHESIA (OUTPATIENT)
Dept: SURGERY | Facility: HOSPITAL | Age: 49
DRG: 386 | End: 2024-03-16
Payer: COMMERCIAL

## 2024-03-16 LAB
ALBUMIN SERPL BCP-MCNC: 3.4 G/DL (ref 3.5–5.2)
ALP SERPL-CCNC: 72 U/L (ref 55–135)
ALT SERPL W/O P-5'-P-CCNC: 17 U/L (ref 10–44)
ANION GAP SERPL CALC-SCNC: 9 MMOL/L (ref 8–16)
AST SERPL-CCNC: 10 U/L (ref 10–40)
BASOPHILS # BLD AUTO: 0.02 K/UL (ref 0–0.2)
BASOPHILS NFR BLD: 0.1 % (ref 0–1.9)
BILIRUB SERPL-MCNC: 0.3 MG/DL (ref 0.1–1)
BUN SERPL-MCNC: 19 MG/DL (ref 6–20)
CALCIUM SERPL-MCNC: 9.3 MG/DL (ref 8.7–10.5)
CHLORIDE SERPL-SCNC: 103 MMOL/L (ref 95–110)
CO2 SERPL-SCNC: 25 MMOL/L (ref 23–29)
CREAT SERPL-MCNC: 1 MG/DL (ref 0.5–1.4)
DIFFERENTIAL METHOD BLD: ABNORMAL
EOSINOPHIL # BLD AUTO: 0.1 K/UL (ref 0–0.5)
EOSINOPHIL NFR BLD: 0.5 % (ref 0–8)
ERYTHROCYTE [DISTWIDTH] IN BLOOD BY AUTOMATED COUNT: 14 % (ref 11.5–14.5)
EST. GFR  (NO RACE VARIABLE): >60 ML/MIN/1.73 M^2
ESTIMATED AVG GLUCOSE: 117 MG/DL (ref 68–131)
GLUCOSE SERPL-MCNC: 117 MG/DL (ref 70–110)
GLUCOSE SERPL-MCNC: 120 MG/DL (ref 70–110)
GLUCOSE SERPL-MCNC: 122 MG/DL (ref 70–110)
GLUCOSE SERPL-MCNC: 170 MG/DL (ref 70–110)
GLUCOSE SERPL-MCNC: 97 MG/DL (ref 70–110)
HBA1C MFR BLD: 5.7 % (ref 4.5–6.2)
HCT VFR BLD AUTO: 42.5 % (ref 40–54)
HGB BLD-MCNC: 13.7 G/DL (ref 14–18)
IMM GRANULOCYTES # BLD AUTO: 0.39 K/UL (ref 0–0.04)
IMM GRANULOCYTES NFR BLD AUTO: 2.9 % (ref 0–0.5)
LYMPHOCYTES # BLD AUTO: 1.2 K/UL (ref 1–4.8)
LYMPHOCYTES NFR BLD: 8.8 % (ref 18–48)
MAGNESIUM SERPL-MCNC: 1.6 MG/DL (ref 1.6–2.6)
MCH RBC QN AUTO: 32.3 PG (ref 27–31)
MCHC RBC AUTO-ENTMCNC: 32.2 G/DL (ref 32–36)
MCV RBC AUTO: 100 FL (ref 82–98)
MONOCYTES # BLD AUTO: 0.7 K/UL (ref 0.3–1)
MONOCYTES NFR BLD: 5 % (ref 4–15)
NEUTROPHILS # BLD AUTO: 11.3 K/UL (ref 1.8–7.7)
NEUTROPHILS NFR BLD: 82.7 % (ref 38–73)
NRBC BLD-RTO: 0 /100 WBC
PLATELET # BLD AUTO: 388 K/UL (ref 150–450)
PMV BLD AUTO: 9.6 FL (ref 9.2–12.9)
POTASSIUM SERPL-SCNC: 4.9 MMOL/L (ref 3.5–5.1)
PROT SERPL-MCNC: 6.9 G/DL (ref 6–8.4)
RBC # BLD AUTO: 4.24 M/UL (ref 4.6–6.2)
SODIUM SERPL-SCNC: 137 MMOL/L (ref 136–145)
WBC # BLD AUTO: 13.65 K/UL (ref 3.9–12.7)

## 2024-03-16 PROCEDURE — 25000003 PHARM REV CODE 250: Performed by: NURSE PRACTITIONER

## 2024-03-16 PROCEDURE — 80280 DRUG ASSAY VEDOLIZUMAB: CPT | Performed by: INTERNAL MEDICINE

## 2024-03-16 PROCEDURE — 21400001 HC TELEMETRY ROOM

## 2024-03-16 PROCEDURE — 30000890 HC MISC. SEND OUT TEST

## 2024-03-16 PROCEDURE — 80053 COMPREHEN METABOLIC PANEL: CPT | Performed by: HOSPITALIST

## 2024-03-16 PROCEDURE — 27200043 HC FORCEPS, BIOPSY: Performed by: INTERNAL MEDICINE

## 2024-03-16 PROCEDURE — 63600175 PHARM REV CODE 636 W HCPCS: Performed by: NURSE ANESTHETIST, CERTIFIED REGISTERED

## 2024-03-16 PROCEDURE — 30000890 LABCORP MISCELLANEOUS TEST: Performed by: INTERNAL MEDICINE

## 2024-03-16 PROCEDURE — D9220A PRA ANESTHESIA: Mod: ANES,,, | Performed by: ANESTHESIOLOGY

## 2024-03-16 PROCEDURE — 63600175 PHARM REV CODE 636 W HCPCS: Performed by: INTERNAL MEDICINE

## 2024-03-16 PROCEDURE — 63600175 PHARM REV CODE 636 W HCPCS: Performed by: NURSE PRACTITIONER

## 2024-03-16 PROCEDURE — 36415 COLL VENOUS BLD VENIPUNCTURE: CPT | Performed by: HOSPITALIST

## 2024-03-16 PROCEDURE — 83735 ASSAY OF MAGNESIUM: CPT | Performed by: HOSPITALIST

## 2024-03-16 PROCEDURE — 83036 HEMOGLOBIN GLYCOSYLATED A1C: CPT | Performed by: NURSE PRACTITIONER

## 2024-03-16 PROCEDURE — 25000003 PHARM REV CODE 250: Performed by: INTERNAL MEDICINE

## 2024-03-16 PROCEDURE — 37000008 HC ANESTHESIA 1ST 15 MINUTES: Performed by: INTERNAL MEDICINE

## 2024-03-16 PROCEDURE — D9220A PRA ANESTHESIA: Mod: CRNA,,, | Performed by: NURSE ANESTHETIST, CERTIFIED REGISTERED

## 2024-03-16 PROCEDURE — 99900035 HC TECH TIME PER 15 MIN (STAT)

## 2024-03-16 PROCEDURE — 0DBN8ZX EXCISION OF SIGMOID COLON, VIA NATURAL OR ARTIFICIAL OPENING ENDOSCOPIC, DIAGNOSTIC: ICD-10-PCS | Performed by: INTERNAL MEDICINE

## 2024-03-16 PROCEDURE — 82397 CHEMILUMINESCENT ASSAY: CPT

## 2024-03-16 PROCEDURE — 36415 COLL VENOUS BLD VENIPUNCTURE: CPT | Performed by: NURSE PRACTITIONER

## 2024-03-16 PROCEDURE — 85025 COMPLETE CBC W/AUTO DIFF WBC: CPT | Performed by: HOSPITALIST

## 2024-03-16 PROCEDURE — 86235 NUCLEAR ANTIGEN ANTIBODY: CPT | Mod: 59 | Performed by: INTERNAL MEDICINE

## 2024-03-16 PROCEDURE — 0DBP8ZX EXCISION OF RECTUM, VIA NATURAL OR ARTIFICIAL OPENING ENDOSCOPIC, DIAGNOSTIC: ICD-10-PCS | Performed by: INTERNAL MEDICINE

## 2024-03-16 PROCEDURE — 45331 SIGMOIDOSCOPY AND BIOPSY: CPT | Performed by: INTERNAL MEDICINE

## 2024-03-16 PROCEDURE — 94761 N-INVAS EAR/PLS OXIMETRY MLT: CPT

## 2024-03-16 PROCEDURE — 37000009 HC ANESTHESIA EA ADD 15 MINS: Performed by: INTERNAL MEDICINE

## 2024-03-16 RX ORDER — PROPOFOL 10 MG/ML
VIAL (ML) INTRAVENOUS
Status: DISCONTINUED | OUTPATIENT
Start: 2024-03-16 | End: 2024-03-16

## 2024-03-16 RX ORDER — HYDROMORPHONE HYDROCHLORIDE 1 MG/ML
1 INJECTION, SOLUTION INTRAMUSCULAR; INTRAVENOUS; SUBCUTANEOUS EVERY 6 HOURS PRN
Status: DISCONTINUED | OUTPATIENT
Start: 2024-03-16 | End: 2024-03-21 | Stop reason: HOSPADM

## 2024-03-16 RX ORDER — SERTRALINE HYDROCHLORIDE 50 MG/1
100 TABLET, FILM COATED ORAL DAILY
Status: DISCONTINUED | OUTPATIENT
Start: 2024-03-16 | End: 2024-03-21 | Stop reason: HOSPADM

## 2024-03-16 RX ORDER — LORAZEPAM 0.5 MG/1
0.5 TABLET ORAL EVERY 6 HOURS PRN
Status: DISCONTINUED | OUTPATIENT
Start: 2024-03-16 | End: 2024-03-21 | Stop reason: HOSPADM

## 2024-03-16 RX ORDER — HYDROCODONE BITARTRATE AND ACETAMINOPHEN 10; 325 MG/1; MG/1
1 TABLET ORAL EVERY 4 HOURS PRN
Status: DISCONTINUED | OUTPATIENT
Start: 2024-03-16 | End: 2024-03-21 | Stop reason: HOSPADM

## 2024-03-16 RX ORDER — GLUCAGON 1 MG
1 KIT INJECTION
Status: DISCONTINUED | OUTPATIENT
Start: 2024-03-16 | End: 2024-03-21 | Stop reason: HOSPADM

## 2024-03-16 RX ORDER — IBUPROFEN 200 MG
24 TABLET ORAL
Status: DISCONTINUED | OUTPATIENT
Start: 2024-03-16 | End: 2024-03-21 | Stop reason: HOSPADM

## 2024-03-16 RX ORDER — IBUPROFEN 200 MG
16 TABLET ORAL
Status: DISCONTINUED | OUTPATIENT
Start: 2024-03-16 | End: 2024-03-21 | Stop reason: HOSPADM

## 2024-03-16 RX ORDER — SODIUM CHLORIDE 0.9 % (FLUSH) 0.9 %
10 SYRINGE (ML) INJECTION
Status: DISCONTINUED | OUTPATIENT
Start: 2024-03-16 | End: 2024-03-16

## 2024-03-16 RX ORDER — SODIUM CHLORIDE 9 MG/ML
INJECTION, SOLUTION INTRAVENOUS CONTINUOUS
Status: DISCONTINUED | OUTPATIENT
Start: 2024-03-16 | End: 2024-03-16

## 2024-03-16 RX ORDER — ATORVASTATIN CALCIUM 10 MG/1
10 TABLET, FILM COATED ORAL DAILY
Status: DISCONTINUED | OUTPATIENT
Start: 2024-03-16 | End: 2024-03-21 | Stop reason: HOSPADM

## 2024-03-16 RX ADMIN — METOPROLOL SUCCINATE 50 MG: 50 TABLET, EXTENDED RELEASE ORAL at 08:03

## 2024-03-16 RX ADMIN — METHYLPREDNISOLONE SODIUM SUCCINATE 60 MG: 40 INJECTION, POWDER, FOR SOLUTION INTRAMUSCULAR; INTRAVENOUS at 05:03

## 2024-03-16 RX ADMIN — HYDROCODONE BITARTRATE AND ACETAMINOPHEN 1 TABLET: 10; 325 TABLET ORAL at 01:03

## 2024-03-16 RX ADMIN — PANTOPRAZOLE SODIUM 40 MG: 40 TABLET, DELAYED RELEASE ORAL at 05:03

## 2024-03-16 RX ADMIN — PROPOFOL 60 MG: 10 INJECTION, EMULSION INTRAVENOUS at 12:03

## 2024-03-16 RX ADMIN — ONDANSETRON 4 MG: 2 INJECTION INTRAMUSCULAR; INTRAVENOUS at 07:03

## 2024-03-16 RX ADMIN — HYDROMORPHONE HYDROCHLORIDE 1 MG: 1 INJECTION, SOLUTION INTRAMUSCULAR; INTRAVENOUS; SUBCUTANEOUS at 08:03

## 2024-03-16 RX ADMIN — ZOLPIDEM TARTRATE 10 MG: 5 TABLET, COATED ORAL at 08:03

## 2024-03-16 RX ADMIN — METHYLPREDNISOLONE SODIUM SUCCINATE 60 MG: 40 INJECTION, POWDER, FOR SOLUTION INTRAMUSCULAR; INTRAVENOUS at 01:03

## 2024-03-16 RX ADMIN — PROPOFOL 40 MG: 10 INJECTION, EMULSION INTRAVENOUS at 12:03

## 2024-03-16 RX ADMIN — ACETAMINOPHEN 650 MG: 325 TABLET ORAL at 08:03

## 2024-03-16 RX ADMIN — HYDROMORPHONE HYDROCHLORIDE 1 MG: 1 INJECTION, SOLUTION INTRAMUSCULAR; INTRAVENOUS; SUBCUTANEOUS at 02:03

## 2024-03-16 RX ADMIN — ATORVASTATIN CALCIUM 10 MG: 10 TABLET, FILM COATED ORAL at 08:03

## 2024-03-16 RX ADMIN — SERTRALINE HYDROCHLORIDE 100 MG: 50 TABLET ORAL at 01:03

## 2024-03-16 RX ADMIN — PROPOFOL 120 MG: 10 INJECTION, EMULSION INTRAVENOUS at 12:03

## 2024-03-16 RX ADMIN — ONDANSETRON 4 MG: 2 INJECTION INTRAMUSCULAR; INTRAVENOUS at 01:03

## 2024-03-16 RX ADMIN — ONDANSETRON 4 MG: 2 INJECTION INTRAMUSCULAR; INTRAVENOUS at 05:03

## 2024-03-16 RX ADMIN — METHYLPREDNISOLONE SODIUM SUCCINATE 60 MG: 40 INJECTION, POWDER, FOR SOLUTION INTRAMUSCULAR; INTRAVENOUS at 08:03

## 2024-03-16 RX ADMIN — HYDROCODONE BITARTRATE AND ACETAMINOPHEN 1 TABLET: 10; 325 TABLET ORAL at 07:03

## 2024-03-16 RX ADMIN — SODIUM CHLORIDE, SODIUM LACTATE, POTASSIUM CHLORIDE, AND CALCIUM CHLORIDE: .6; .31; .03; .02 INJECTION, SOLUTION INTRAVENOUS at 12:03

## 2024-03-16 NOTE — PLAN OF CARE
Discharge orders and chart reviewed. No other discharge needs noted at this time. Pt is clear for discharge from case management, after cleared by GI and seen by . Pt is discharging to home.     Follow up appointments scheduled and added to AVS. Patient discharged home with no further case management needs.         03/16/24 0922   Final Note   Assessment Type Final Discharge Note   Anticipated Discharge Disposition Home   What phone number can be called within the next 1-3 days to see how you are doing after discharge? [phone]   Hospital Resources/Appts/Education Provided Provided patient/caregiver with written discharge plan information;Appointments scheduled and added to AVS   Post-Acute Status   Coverage UMR   Discharge Delays None known at this time

## 2024-03-16 NOTE — PROVATION PATIENT INSTRUCTIONS
Discharge Summary/Instructions after an Endoscopic Procedure  Patient Name: Jacoby Jean-Baptiste  Patient MRN: [id number]  Patient YOB: 1975 Saturday, March 16, 2024  ALEKSANDER Ramos MD  RESTRICTIONS:  During your procedure today, you received medications for sedation.  These   medications may affect your judgment, balance and coordination.  Therefore,   for 24 hours, you have the following restrictions:   - DO NOT drive a car, operate machinery, make legal/financial decisions,   sign important papers or drink alcohol.    ACTIVITY:  Today: no heavy lifting, straining or running due to procedural   sedation/anesthesia.  The following day: return to full activity including work.  DIET:  Eat and drink normally unless instructed otherwise.     TREATMENT FOR COMMON SIDE EFFECTS:  - Mild abdominal pain, nausea, belching, bloating or excessive gas:  rest,   eat lightly and use a heating pad.  - Sore Throat: treat with throat lozenges and/or gargle with warm salt   water.  - Because air was used during the procedure, expelling large amounts of air   from your rectum or belching is normal.  - If a bowel prep was taken, you may not have a bowel movement for 1-3 days.    This is normal.  SYMPTOMS TO WATCH FOR AND REPORT TO YOUR PHYSICIAN:  1. Abdominal pain or bloating, other than gas cramps.  2. Chest pain.  3. Back pain.  4. Signs of infection such as: chills or fever occurring within 24 hours   after the procedure.  5. Rectal bleeding, which would show as bright red, maroon, or black stools.   (A tablespoon of blood from the rectum is not serious, especially if   hemorrhoids are present.)  6. Vomiting.  7. Weakness or dizziness.  GO DIRECTLY TO THE NEAREST EMERGENCY ROOM IF YOU HAVE ANY OF THE FOLLOWING:      Difficulty breathing              Chills and/or fever over 101 F   Persistent vomiting and/or vomiting blood   Severe abdominal pain   Severe chest pain   Black, tarry stools   Bleeding- more than one  tablespoon   Any other symptom or condition that you feel may need urgent attention  Your doctor recommends these additional instructions:  If any biopsies were taken, your doctors clinic will contact you in 1 to 2   weeks with any results.  - Await pathology results.   - Continue entyvio and agree with escalating to q4wk dosing  - Start methylprednisolone while in the hospital  - He will need close outpt follow up  - Resume previous diet.   - Return patient to hospital [facility] for ongoing care.  For questions, problems or results please call your physician - ALEKSANDER Ramos MD at Work:  [phone].  [facility], EMERGENCY ROOM PHONE NUMBER: [phone]  IF A COMPLICATION OR EMERGENCY SITUATION ARISES AND YOU ARE UNABLE TO REACH   YOUR PHYSICIAN - GO DIRECTLY TO THE EMERGENCY ROOM.  ALEKSANDER Ramos MD  3/16/2024 1:07:23 PM  This report has been verified and signed electronically.  Dear patient,  As a result of recent federal legislation (The Federal Cures Act), you may   receive lab or pathology results from your procedure in your [facility]   account before your physician is able to contact you. Your physician or   their representative will relay the results to you with their   recommendations at their soonest availability.  Thank you,  PROVATION

## 2024-03-16 NOTE — ANESTHESIA POSTPROCEDURE EVALUATION
Anesthesia Post Evaluation    Patient: Jacoby Jean-Baptiste    Procedure(s) Performed: Procedure(s) (LRB):  COLONOSCOPY (N/A)    Final Anesthesia Type: general      Patient location: OR1.  Patient participation: Yes- Able to Participate  Level of consciousness: awake and alert  Post-procedure vital signs: reviewed and stable  Pain management: adequate  Airway patency: patent    PONV status at discharge: No PONV  Anesthetic complications: no      Cardiovascular status: stable  Respiratory status: unassisted  Hydration status: euvolemic  Follow-up not needed.        VSS and patient wide awake with no complaints except that he has to defecate.      Vitals Value Taken Time   BP  03/16/24 1243   Temp  03/16/24 1243   Pulse  03/16/24 1243   Resp  03/16/24 1243   SpO2  03/16/24 1243         Event Time   Out of Recovery 03/16/2024 12:39:26         Pain/Kusum Score: Pain Rating Prior to Med Admin: 10 (3/16/2024  8:04 AM)  Pain Rating Post Med Admin: 2 (3/16/2024  8:34 AM)

## 2024-03-16 NOTE — CARE UPDATE
03/16/24 1608   Patient Assessment/Suction   Level of Consciousness (AVPU) alert   Respiratory Effort Normal   PRE-TX-O2   Device (Oxygen Therapy) room air   SpO2 99 %   Pulse Oximetry Type Intermittent   $ Pulse Oximetry - Multiple Charge Pulse Oximetry - Multiple   Pulse 70   Resp 16   Respiratory Evaluation   $ Care Plan Tech Time 15 min     D/C O2

## 2024-03-16 NOTE — HOSPITAL COURSE
Patient was admitted with ulcerative colitis flare.  He was placed on IV Solu-Medrol and GI was consulted.  Stool for occult blood was positive.  C diff was negative.  He was monitored closely during his stay.  His pain was controlled with both oral and IV narcotics p.r.n..  He underwent bowel prep for colonoscopy.  Colonoscopy revealed severe ulcerative colitis.  His symptoms persisted and he continued to have diarrheal stools.  Welchol and Lomotil were added to his regimen.  IV steroids were transitioned to oral prednisone.  His hemoglobin hematocrit remained stable.  Patient was seen on day of discharge.  The patient was cleared for discharge by GI.  Discussed case with Dr. Jean with GI who recommended 40 mg prednisone and Welchol on discharge which was prescribed.  Hydrocodone was prescribed on discharge.  Patient to follow up with GI and PCP on discharge.  Return precautions discussed and patient voiced understanding.  All questions answered.

## 2024-03-16 NOTE — SUBJECTIVE & OBJECTIVE
Interval History:  Patient seen and examined.  No acute events overnight. Feels poorly today.  Awaiting colonoscopy.  Still having abdominal pain.      Review of Systems   Gastrointestinal:  Positive for abdominal pain, blood in stool, diarrhea, nausea and vomiting.   All other systems reviewed and are negative.    Objective:     Vital Signs (Most Recent):  Temp: 98.7 °F (37.1 °C) (03/16/24 1151)  Pulse: 75 (03/16/24 1151)  Resp: 18 (03/16/24 1151)  BP: (!) 150/72 (03/16/24 1151)  SpO2: 98 % (03/16/24 1151) Vital Signs (24h Range):  Temp:  [97.8 °F (36.6 °C)-98.7 °F (37.1 °C)] 98.7 °F (37.1 °C)  Pulse:  [75-83] 75  Resp:  [17-18] 18  SpO2:  [96 %-98 %] 98 %  BP: (130-163)/(69-93) 150/72     Weight: 124.9 kg (275 lb 4.8 oz)  Body mass index is 45.81 kg/m².    Intake/Output Summary (Last 24 hours) at 3/16/2024 1314  Last data filed at 3/16/2024 1235  Gross per 24 hour   Intake 1330 ml   Output 350 ml   Net 980 ml           Physical Exam  Constitutional:       General: He is not in acute distress.     Appearance: He is obese. He is not ill-appearing, toxic-appearing or diaphoretic.   Cardiovascular:      Rate and Rhythm: Normal rate and regular rhythm.      Pulses: Normal pulses.      Heart sounds: Normal heart sounds.   Pulmonary:      Effort: Pulmonary effort is normal. No respiratory distress.      Breath sounds: Normal breath sounds.   Abdominal:      General: Bowel sounds are normal. There is no distension.      Palpations: Abdomen is soft.      Tenderness: There is abdominal tenderness (generalized ttp). There is no guarding.   Skin:     General: Skin is warm and dry.      Coloration: Skin is not pale.      Findings: No erythema.   Neurological:      Mental Status: He is alert and oriented to person, place, and time. Mental status is at baseline.             Significant Labs: All pertinent labs within the past 24 hours have been reviewed.  CBC:   Recent Labs   Lab 03/14/24  1526 03/15/24  0737 03/16/24  0937    WBC 15.04* 13.34* 13.65*   HGB 12.4* 12.6* 13.7*   HCT 38.3* 40.0 42.5    387 388       CMP:   Recent Labs   Lab 03/14/24  1526 03/15/24  0737    138   K 4.0 4.1    105   CO2 24 23   GLU 92 103   BUN 21* 18   CREATININE 1.1 1.0   CALCIUM 9.0 8.8   PROT 7.3 7.0   ALBUMIN 3.4* 3.4*   BILITOT 0.3 0.2   ALKPHOS 84 76   AST 8* 8*   ALT 19 16   ANIONGAP 9 10         Significant Imaging: I have reviewed all pertinent imaging results/findings within the past 24 hours.

## 2024-03-16 NOTE — ANESTHESIA PREPROCEDURE EVALUATION
03/16/2024  Jacoby Jean-Baptiste is a 49 y.o., male.           Tobacco Use:  The patient  reports that he has quit smoking. His smoking use included cigarettes. He started smoking about 31 years ago. He has a 31.2 pack-year smoking history. He has never used smokeless tobacco.     Results for orders placed or performed during the hospital encounter of 01/30/24   EKG 12-lead    Collection Time: 01/30/24 10:26 AM    Narrative    Test Reason : R07.9,    Vent. Rate : 086 BPM     Atrial Rate : 086 BPM     P-R Int : 132 ms          QRS Dur : 074 ms      QT Int : 380 ms       P-R-T Axes : 026 041 039 degrees     QTc Int : 454 ms    Normal sinus rhythm  Normal ECG  When compared with ECG of 16-JAN-2024 15:40,  No significant change was found  Confirmed by Mynor RENTERIA, Harris ONEILL ([id number]) on 2/3/2024 12:27:42 PM    Referred By: [email or web address]   SELF           Confirmed By:Harris Lowe MD        Imaging Results              CT Abdomen Pelvis With IV Contrast NO Oral Contrast (Final result)  Result time 03/14/24 16:46:11      Final result by Carl Macias MD (03/14/24 16:46:11)                   Narrative:    CMS MANDATED QUALITY DATA-CT RADIATION DOSE-436  All CT scans at this facility use dose modulation, iterative reconstruction, and or weight-based dosing when appropriate to reduce radiation dose to as low as reasonably achievable. Unless otherwise stated, incidental findings do not require dedicated follow-up imaging.    HISTORY: Abdominal pain, acute, nonlocalized  rectal bleeding, diarrhea.    FINDINGS: Axial postcontrast imaging was performed with 100 mL Omnipaque 350 IV contrast. Comparison to the CT of 02/13/2024.    CT ABDOMEN: The lung bases are clear. There are cholecystectomy clips, with the liver enhancing normally. The spleen is normal in size and enhances normally, with the pancreas, adrenal glands and kidneys  enhancing normally. There are bilateral nonobstructing renal calculi measuring up to 6 mm on the left, with no ureteral calculi or hydroureteronephrosis.    Scattered calcified plaque involves normal caliber abdominal aorta, with no aortic dissection or aneurysm. No enlarged mesenteric or retroperitoneal lymph nodes. There is a duodenal diverticulum arising from second portion. No small bowel wall thickening or small bowel obstruction.    The appendix is normal. There is long segment circumferential wall thickening, mucosal hyperenhancement and pericolonic fat stranding involving the descending colon, sigmoid colon and rectum, similar to the prior exam. There is no pericolonic rim-enhancing fluid collection or extraluminal free air.    CT PELVIS: Long segment wall thickening, mucosal hyperenhancement and pericolic fat stranding involves the sigmoid colon and rectum, with no rim-enhancing fluid collections. The urinary bladder and pelvic vasculature enhance normally, with rim calcified nodule in the anterior metallic fat, nonspecific but unchanged.    There are no enlarged pelvic or inguinal lymph nodes. The extraperitoneal soft tissues enhance normally. There are no acute fractures or destructive osseous lesions, with intervertebral disc space narrowing and facet arthropathy in the spine. There is degenerative narrowing of the hip joint spaces.    IMPRESSION:  1. Findings compatible with colitis-proctitis of the distal descending colon, sigmoid colon and rectum, nonspecific but likely of an acute infectious etiology.  2. Additional observations as described.    Electronically signed by:  Carl Macias MD  03/14/2024 04:46 PM CDT Workstation: [id number]                                     Lab Results   Component Value Date    WBC 13.34 (H) 03/15/2024    HGB 12.6 (L) 03/15/2024    HCT 40.0 03/15/2024     (H) 03/15/2024     03/15/2024     BMP  Lab Results   Component Value Date     03/15/2024    K  4.1 03/15/2024     03/15/2024    CO2 23 03/15/2024    BUN 18 03/15/2024    CREATININE 1.0 03/15/2024    CALCIUM 8.8 03/15/2024    ANIONGAP 10 03/15/2024     03/15/2024    GLU 92 03/14/2024    GLU 70 02/12/2024       Results for orders placed during the hospital encounter of 11/17/23    Echo    Interpretation Summary    Left Ventricle: The left ventricle is normal in size. Normal wall thickness. There is concentric remodeling. Normal wall motion. There is normal systolic function with a visually estimated ejection fraction of 55 - 60%. There is normal diastolic function.    Right Ventricle: Normal right ventricular cavity size. Wall thickness is normal. Right ventricle wall motion  is normal. Systolic function is normal.    IVC/SVC: Normal venous pressure at 3 mmHg.         Pre-op Assessment    I have reviewed the Patient Summary Reports.     I have reviewed the Nursing Notes. I have reviewed the NPO Status.   I have reviewed the Medications.     Review of Systems  Anesthesia Hx:    Patient reports intraoperative recall during gallbladder surgery.  However, he does not seem traumatized by it at all.           Denies Family Hx of Anesthesia complications.     Social:  Alcohol Use, Former Smoker Recent history of opiates and marijuana      Hematology/Oncology:    Oncology Normal    -- Anemia:                                  EENT/Dental:   Multiple teeth eroded and broken at base          Cardiovascular:     Hypertension, well controlled    Dysrhythmias (H/O cardiac radiofrequency ablation for supraventricular tachycardia in 2017.  No problems since then.)       hyperlipidemia   ECG has been reviewed. 02/02/2024 nuclear stress test negative, with normal EF.  Mild MR and TR per KATJA November 2023.      Shortness of Breath                    Hypertension         Pulmonary:  Pneumonia (bilateral pneumonia November 2023, resolved)       Denies Sleep Apnea.  Patient followed by Dr. Villarreal                    Renal/:  Chronic Renal Disease renal calculi  Magnesium 1.2 03/15/2024.  Patient received 2 g IV after that.     Kidney Function/Disease             Hepatic/GI:   PUD,     Patient presents with diarrhea and rectal bleeding, probable ulcerative colitis flare-up.  C diff negative this admission.  History of fecal transplant for C diff.  Ozempic Therapy, last dose 7 days ago.  Patient received Zofran this morning for his chronic nausea.  Nausea resolved.  No recent emesis.  No history of gastroparesis.  No solid food for the past 3 days.  Nothing to eat or drink today.  Patient tolerated clear liquids yesterday.   Peptic Ulcer Disease   Bowel Conditions:  Inflammatory Bowel Disease, Ulcerative Colitis        Musculoskeletal:  Musculoskeletal Normal                Neurological:    Denies CVA.        Left-sided weakness per chart but patient denies                            Endocrine:  Diabetes (borderline per patient), type 2    Diabetes                    Morbid Obesity / BMI > 40  Psych:  Psychiatric History anxiety  Sleep Disorder and Insomnia.       Sleep Disorder and Insomnia.        Physical Exam  General: Cooperative, Alert and Oriented    Airway:  Mallampati: III   Mouth Opening: Normal  TM Distance: > 6 cm  Tongue: Normal  Neck ROM: Normal ROM    Dental:  Periodontal disease    Chest/Lungs:  Clear to auscultation, Normal Respiratory Rate    Heart:  Rate: Normal  Rhythm: Regular Rhythm  Sounds: Normal        Anesthesia Plan  Type of Anesthesia, risks & benefits discussed:    Anesthesia Type: Gen Natural Airway  Intra-op Monitoring Plan: Standard ASA Monitors  Induction:  IV  ASA Score: 3 Emergent  Anesthesia Plan Notes: Procedural oxygen mask        Ready For Surgery From Anesthesia Perspective.     .

## 2024-03-16 NOTE — NURSING
1148 patient off the floor for procedure.   1245 patient back from procedure. Patient awake and alert. No acute distress noted. Safety precautions in place. Will continue to monitor.

## 2024-03-16 NOTE — TRANSFER OF CARE
"Anesthesia Transfer of Care Note    Patient: Jacoby Jean-Baptiste    Procedure(s) Performed: Procedure(s) (LRB):  COLONOSCOPY (N/A)    Patient location: GI    Anesthesia Type: general    Transport from OR: Transported from OR on 2-3 L/min O2 by NC with adequate spontaneous ventilation    Post pain: adequate analgesia    Post assessment: no apparent anesthetic complications    Post vital signs: stable    Level of consciousness: awake    Nausea/Vomiting: no nausea/vomiting    Complications: none    Transfer of care protocol was followed      Last vitals: Visit Vitals  BP (!) 150/72 (BP Location: Left arm, Patient Position: Lying)   Pulse 75   Temp 37.1 °C (98.7 °F) (Tympanic)   Resp 18   Ht 5' 5" (1.651 m)   Wt 124.9 kg (275 lb 4.8 oz)   SpO2 98%   BMI 45.81 kg/m²     "

## 2024-03-16 NOTE — PROGRESS NOTES
[facility] Medicine  Progress Note    Patient Name: Jacoby Jean-Batpiste  MRN: [id number]  Patient Class: IP- Inpatient   Admission Date: 3/14/2024  Length of Stay: 1 days  Attending Physician: Irma Patel MD  Primary Care Provider: Hunter Aguilar III, MD        Subjective:     Principal Problem:Ulcerative colitis        HPI:  49 year old with a past medicine history of ulcertive colitis, DM, obesity, HLD, HTN, C diff whom presents to the emergency department with reported bloody diarrhea over last 4 days  with associated nausea, vomiting, worsening abdominal pain patient has a history of ulcerative colitis who history of C diff in the past he sees Dr. Adama giron he receives regular infusions and is on budesonide on a daily basis as well despite these medications his symptoms have flared he denies any recorded fever no urinary symptoms reported. Patient reports having 4-6 bloody diarrhea stools a day, on daily prednisone with no relief.   In ER, Dilaudid, zofran and IV fluids started, WBC 15, CT abdomen with colitis-proctitis of the distal descending colon, sigmoid colon and rectum, nonspecific but likely of an acute infectious etiology.Admit to hospital medicine , IV solumedrol TID. Waiting stool panel and rule out c diff before starting abx.     Overview/Hospital Course:  Patient was admitted with ulcerative colitis flare.  He was placed on IV Solu-Medrol and GI was consulted.  Stool for occult blood was positive.  C diff was negative.  He was monitored closely during his stay.  His pain was controlled with both oral and IV narcotics p.r.n..  He underwent bowel prep for colonoscopy.    Interval History:  Patient seen and examined.  No acute events overnight. Feels poorly today.  Awaiting colonoscopy.  Still having abdominal pain.      Review of Systems   Gastrointestinal:  Positive for abdominal pain, blood in stool, diarrhea, nausea and vomiting.   All other systems reviewed and are  negative.    Objective:     Vital Signs (Most Recent):  Temp: 98.7 °F (37.1 °C) (03/16/24 1151)  Pulse: 75 (03/16/24 1151)  Resp: 18 (03/16/24 1151)  BP: (!) 150/72 (03/16/24 1151)  SpO2: 98 % (03/16/24 1151) Vital Signs (24h Range):  Temp:  [97.8 °F (36.6 °C)-98.7 °F (37.1 °C)] 98.7 °F (37.1 °C)  Pulse:  [75-83] 75  Resp:  [17-18] 18  SpO2:  [96 %-98 %] 98 %  BP: (130-163)/(69-93) 150/72     Weight: 124.9 kg (275 lb 4.8 oz)  Body mass index is 45.81 kg/m².    Intake/Output Summary (Last 24 hours) at 3/16/2024 1314  Last data filed at 3/16/2024 1235  Gross per 24 hour   Intake 1330 ml   Output 350 ml   Net 980 ml           Physical Exam  Constitutional:       General: He is not in acute distress.     Appearance: He is obese. He is not ill-appearing, toxic-appearing or diaphoretic.   Cardiovascular:      Rate and Rhythm: Normal rate and regular rhythm.      Pulses: Normal pulses.      Heart sounds: Normal heart sounds.   Pulmonary:      Effort: Pulmonary effort is normal. No respiratory distress.      Breath sounds: Normal breath sounds.   Abdominal:      General: Bowel sounds are normal. There is no distension.      Palpations: Abdomen is soft.      Tenderness: There is abdominal tenderness (generalized ttp). There is no guarding.   Skin:     General: Skin is warm and dry.      Coloration: Skin is not pale.      Findings: No erythema.   Neurological:      Mental Status: He is alert and oriented to person, place, and time. Mental status is at baseline.             Significant Labs: All pertinent labs within the past 24 hours have been reviewed.  CBC:   Recent Labs   Lab 03/14/24  1526 03/15/24  0737 03/16/24  0937   WBC 15.04* 13.34* 13.65*   HGB 12.4* 12.6* 13.7*   HCT 38.3* 40.0 42.5    387 388       CMP:   Recent Labs   Lab 03/14/24  1526 03/15/24  0737    138   K 4.0 4.1    105   CO2 24 23   GLU 92 103   BUN 21* 18   CREATININE 1.1 1.0   CALCIUM 9.0 8.8   PROT 7.3 7.0   ALBUMIN 3.4* 3.4*  "  BILITOT 0.3 0.2   ALKPHOS 84 76   AST 8* 8*   ALT 19 16   ANIONGAP 9 10         Significant Imaging: I have reviewed all pertinent imaging results/findings within the past 24 hours.    Assessment/Plan:      * Ulcerative colitis  Multiple bloody diarrhea daily  Trend h/h  IV solumedrol 60 mg TID  IV fluids  Pain control  CT abdomen: colitis-proctitis of the distal descending colon, sigmoid colon and rectum, nonspecific but likely of an acute infectious etiology.  GI consulted  Colonoscopy today  Diabetic diet once colonoscopy completed    Leukocytosis  WBC 15.04  Hold abx coverage for now until stool  panel returns.   Trend.   Patient on home prednisone, start IV solumderol TID  Lactic acid level normal  Improved today - likely 2/2 to steroids    Type 2 diabetes mellitus without complication, without long-term current use of insulin  Patient's FSGs are controlled on current medication regimen.  Last A1c reviewed-   Lab Results   Component Value Date    HGBA1C 5.2 10/27/2023     Most recent fingerstick glucose reviewed- No results for input(s): "POCTGLUCOSE" in the last 24 hours.  Current correctional scale  High  Maintain anti-hyperglycemic dose as follows-   Antihyperglycemics (From admission, onward)      Start     Stop Route Frequency Ordered    03/14/24 2139  insulin aspart U-100 pen 0-5 Units         -- SubQ Before meals & nightly PRN 03/14/24 2040          Hold Oral hypoglycemics while patient is in the hospital.    Morbid obesity  Body mass index is 44.93 kg/m². Morbid obesity complicates all aspects of disease management from diagnostic modalities to treatment. Weight loss encouraged and health benefits explained to patient.           VTE Risk Mitigation (From admission, onward)           Ordered     Reason for No Pharmacological VTE Prophylaxis  Once        Question:  Reasons:  Answer:  Active Bleeding    03/14/24 2040     IP VTE HIGH RISK PATIENT  Once         03/14/24 2040     Place sequential " compression device  Until discontinued         03/14/24 2040                    Discharge Planning   GERARDO: 3/16/2024     Code Status: Full Code   Is the patient medically ready for discharge?:     Reason for patient still in hospital (select all that apply): Patient trending condition, Imaging, and Consult recommendations  Discharge Plan A: Home   Discharge Delays: None known at this time              Rosalind Barrett NP  Department of Hospital Medicine   [facility]

## 2024-03-16 NOTE — ASSESSMENT & PLAN NOTE
Multiple bloody diarrhea daily  Trend h/h  IV solumedrol 60 mg TID  IV fluids  Pain control  CT abdomen: colitis-proctitis of the distal descending colon, sigmoid colon and rectum, nonspecific but likely of an acute infectious etiology.  GI consulted  Colonoscopy today  Diabetic diet once colonoscopy completed

## 2024-03-17 LAB
ALBUMIN SERPL BCP-MCNC: 3.5 G/DL (ref 3.5–5.2)
ALP SERPL-CCNC: 68 U/L (ref 55–135)
ALT SERPL W/O P-5'-P-CCNC: 19 U/L (ref 10–44)
ANION GAP SERPL CALC-SCNC: 8 MMOL/L (ref 8–16)
AST SERPL-CCNC: 11 U/L (ref 10–40)
BACTERIA STL CULT: NORMAL
BACTERIA STL CULT: NORMAL
BASOPHILS # BLD AUTO: 0.06 K/UL (ref 0–0.2)
BASOPHILS NFR BLD: 0.5 % (ref 0–1.9)
BILIRUB SERPL-MCNC: 0.3 MG/DL (ref 0.1–1)
BUN SERPL-MCNC: 20 MG/DL (ref 6–20)
CALCIUM SERPL-MCNC: 9.2 MG/DL (ref 8.7–10.5)
CHLORIDE SERPL-SCNC: 102 MMOL/L (ref 95–110)
CO2 SERPL-SCNC: 26 MMOL/L (ref 23–29)
CREAT SERPL-MCNC: 1 MG/DL (ref 0.5–1.4)
DIFFERENTIAL METHOD BLD: ABNORMAL
EOSINOPHIL # BLD AUTO: 0 K/UL (ref 0–0.5)
EOSINOPHIL NFR BLD: 0 % (ref 0–8)
ERYTHROCYTE [DISTWIDTH] IN BLOOD BY AUTOMATED COUNT: 13.7 % (ref 11.5–14.5)
EST. GFR  (NO RACE VARIABLE): >60 ML/MIN/1.73 M^2
GLUCOSE SERPL-MCNC: 143 MG/DL (ref 70–110)
GLUCOSE SERPL-MCNC: 157 MG/DL (ref 70–110)
GLUCOSE SERPL-MCNC: 167 MG/DL (ref 70–110)
GLUCOSE SERPL-MCNC: 173 MG/DL (ref 70–110)
GLUCOSE SERPL-MCNC: 174 MG/DL (ref 70–110)
HCT VFR BLD AUTO: 36.6 % (ref 40–54)
HGB BLD-MCNC: 11.9 G/DL (ref 14–18)
IMM GRANULOCYTES # BLD AUTO: 0.31 K/UL (ref 0–0.04)
IMM GRANULOCYTES NFR BLD AUTO: 2.7 % (ref 0–0.5)
LYMPHOCYTES # BLD AUTO: 0.6 K/UL (ref 1–4.8)
LYMPHOCYTES NFR BLD: 5.6 % (ref 18–48)
MAGNESIUM SERPL-MCNC: 1.5 MG/DL (ref 1.6–2.6)
MCH RBC QN AUTO: 32 PG (ref 27–31)
MCHC RBC AUTO-ENTMCNC: 32.5 G/DL (ref 32–36)
MCV RBC AUTO: 98 FL (ref 82–98)
MONOCYTES # BLD AUTO: 0.7 K/UL (ref 0.3–1)
MONOCYTES NFR BLD: 5.9 % (ref 4–15)
NEUTROPHILS # BLD AUTO: 9.8 K/UL (ref 1.8–7.7)
NEUTROPHILS NFR BLD: 85.3 % (ref 38–73)
NRBC BLD-RTO: 0 /100 WBC
PLATELET # BLD AUTO: 373 K/UL (ref 150–450)
PMV BLD AUTO: 9.6 FL (ref 9.2–12.9)
POTASSIUM SERPL-SCNC: 4.7 MMOL/L (ref 3.5–5.1)
PROT SERPL-MCNC: 6.9 G/DL (ref 6–8.4)
RBC # BLD AUTO: 3.72 M/UL (ref 4.6–6.2)
SODIUM SERPL-SCNC: 136 MMOL/L (ref 136–145)
WBC # BLD AUTO: 11.44 K/UL (ref 3.9–12.7)

## 2024-03-17 PROCEDURE — 25000003 PHARM REV CODE 250: Performed by: INTERNAL MEDICINE

## 2024-03-17 PROCEDURE — 21400001 HC TELEMETRY ROOM

## 2024-03-17 PROCEDURE — 25000003 PHARM REV CODE 250: Performed by: NURSE PRACTITIONER

## 2024-03-17 PROCEDURE — 25000003 PHARM REV CODE 250: Performed by: HOSPITALIST

## 2024-03-17 PROCEDURE — 85025 COMPLETE CBC W/AUTO DIFF WBC: CPT | Performed by: INTERNAL MEDICINE

## 2024-03-17 PROCEDURE — 36415 COLL VENOUS BLD VENIPUNCTURE: CPT | Performed by: INTERNAL MEDICINE

## 2024-03-17 PROCEDURE — 83735 ASSAY OF MAGNESIUM: CPT | Performed by: INTERNAL MEDICINE

## 2024-03-17 PROCEDURE — 80053 COMPREHEN METABOLIC PANEL: CPT | Performed by: INTERNAL MEDICINE

## 2024-03-17 PROCEDURE — 63600175 PHARM REV CODE 636 W HCPCS: Performed by: INTERNAL MEDICINE

## 2024-03-17 RX ORDER — LOPERAMIDE HYDROCHLORIDE 2 MG/1
2 CAPSULE ORAL 4 TIMES DAILY PRN
Status: DISCONTINUED | OUTPATIENT
Start: 2024-03-17 | End: 2024-03-18

## 2024-03-17 RX ORDER — ALUMINUM HYDROXIDE, MAGNESIUM HYDROXIDE, AND SIMETHICONE 1200; 120; 1200 MG/30ML; MG/30ML; MG/30ML
30 SUSPENSION ORAL
Status: DISCONTINUED | OUTPATIENT
Start: 2024-03-17 | End: 2024-03-21 | Stop reason: HOSPADM

## 2024-03-17 RX ADMIN — METHYLPREDNISOLONE SODIUM SUCCINATE 60 MG: 40 INJECTION, POWDER, FOR SOLUTION INTRAMUSCULAR; INTRAVENOUS at 05:03

## 2024-03-17 RX ADMIN — ALUMINUM HYDROXIDE, MAGNESIUM HYDROXIDE, AND SIMETHICONE 30 ML: 1200; 120; 1200 SUSPENSION ORAL at 08:03

## 2024-03-17 RX ADMIN — HYDROCODONE BITARTRATE AND ACETAMINOPHEN 1 TABLET: 10; 325 TABLET ORAL at 12:03

## 2024-03-17 RX ADMIN — ALUMINUM HYDROXIDE, MAGNESIUM HYDROXIDE, AND SIMETHICONE 30 ML: 1200; 120; 1200 SUSPENSION ORAL at 10:03

## 2024-03-17 RX ADMIN — BUSPIRONE HYDROCHLORIDE 7.5 MG: 5 TABLET ORAL at 02:03

## 2024-03-17 RX ADMIN — LOPERAMIDE HYDROCHLORIDE 2 MG: 2 CAPSULE ORAL at 05:03

## 2024-03-17 RX ADMIN — HYDROCODONE BITARTRATE AND ACETAMINOPHEN 1 TABLET: 10; 325 TABLET ORAL at 10:03

## 2024-03-17 RX ADMIN — HYDROCODONE BITARTRATE AND ACETAMINOPHEN 1 TABLET: 10; 325 TABLET ORAL at 02:03

## 2024-03-17 RX ADMIN — Medication 800 MG: at 12:03

## 2024-03-17 RX ADMIN — HYDROCODONE BITARTRATE AND ACETAMINOPHEN 1 TABLET: 10; 325 TABLET ORAL at 05:03

## 2024-03-17 RX ADMIN — ALUMINUM HYDROXIDE, MAGNESIUM HYDROXIDE, AND SIMETHICONE 30 ML: 1200; 120; 1200 SUSPENSION ORAL at 05:03

## 2024-03-17 RX ADMIN — METOPROLOL SUCCINATE 50 MG: 50 TABLET, EXTENDED RELEASE ORAL at 08:03

## 2024-03-17 RX ADMIN — METHYLPREDNISOLONE SODIUM SUCCINATE 60 MG: 40 INJECTION, POWDER, FOR SOLUTION INTRAMUSCULAR; INTRAVENOUS at 09:03

## 2024-03-17 RX ADMIN — ACETAMINOPHEN 650 MG: 325 TABLET ORAL at 08:03

## 2024-03-17 RX ADMIN — BUSPIRONE HYDROCHLORIDE 7.5 MG: 5 TABLET ORAL at 08:03

## 2024-03-17 RX ADMIN — LOPERAMIDE HYDROCHLORIDE 2 MG: 2 CAPSULE ORAL at 11:03

## 2024-03-17 RX ADMIN — Medication 800 MG: at 05:03

## 2024-03-17 RX ADMIN — PANTOPRAZOLE SODIUM 40 MG: 40 TABLET, DELAYED RELEASE ORAL at 05:03

## 2024-03-17 RX ADMIN — METHYLPREDNISOLONE SODIUM SUCCINATE 60 MG: 40 INJECTION, POWDER, FOR SOLUTION INTRAMUSCULAR; INTRAVENOUS at 02:03

## 2024-03-17 RX ADMIN — ZOLPIDEM TARTRATE 10 MG: 5 TABLET, COATED ORAL at 08:03

## 2024-03-17 RX ADMIN — ATORVASTATIN CALCIUM 10 MG: 10 TABLET, FILM COATED ORAL at 08:03

## 2024-03-17 RX ADMIN — HYDROCODONE BITARTRATE AND ACETAMINOPHEN 1 TABLET: 10; 325 TABLET ORAL at 08:03

## 2024-03-17 RX ADMIN — SERTRALINE HYDROCHLORIDE 100 MG: 50 TABLET ORAL at 08:03

## 2024-03-17 NOTE — PLAN OF CARE
Problem: Adult Inpatient Plan of Care  Goal: Plan of Care Review  Outcome: Ongoing, Progressing  Goal: Patient-Specific Goal (Individualized)  Outcome: Ongoing, Progressing  Goal: Absence of Hospital-Acquired Illness or Injury  Outcome: Ongoing, Progressing  Goal: Optimal Comfort and Wellbeing  Outcome: Ongoing, Progressing  Goal: Readiness for Transition of Care  Outcome: Ongoing, Progressing     Problem: Bariatric Environmental Safety  Goal: Safety Maintained with Care  Outcome: Ongoing, Progressing     Problem: Diabetes Comorbidity  Goal: Blood Glucose Level Within Targeted Range  Outcome: Ongoing, Progressing     Problem: Infection  Goal: Absence of Infection Signs and Symptoms  Outcome: Ongoing, Progressing     Problem: Fall Injury Risk  Goal: Absence of Fall and Fall-Related Injury  Outcome: Ongoing, Progressing     Problem: Adjustment to Illness (Bowel Disease, Inflammatory)  Goal: Optimal Adaptation to Chronic Illness  Outcome: Ongoing, Progressing     Problem: Diarrhea (Bowel Disease, Inflammatory)  Goal: Diarrhea Symptom Relief  Outcome: Ongoing, Progressing     Problem: Infection (Bowel Disease, Inflammatory)  Goal: Absence of Infection Signs and Symptoms  Outcome: Ongoing, Progressing     Problem: Nutrition Impaired (Bowel Disease, Inflammatory)  Goal: Optimal Nutrition  Outcome: Ongoing, Progressing     Problem: Pain (Bowel Disease, Inflammatory)  Goal: Acceptable Pain Control  Outcome: Ongoing, Progressing

## 2024-03-17 NOTE — ASSESSMENT & PLAN NOTE
Multiple bloody diarrhea daily  Trend h/h  IV solumedrol 60 mg TID  IV fluids  Pain control  CT abdomen: colitis-proctitis of the distal descending colon, sigmoid colon and rectum, nonspecific but likely of an acute infectious etiology.  GI consulted  Colonoscopy - severe UC  Diabetic diet

## 2024-03-17 NOTE — PROGRESS NOTES
Replaced by [facility] Medicine  Progress Note    Patient Name: Jacoby Jean-Baptiste  MRN: [id number]  Patient Class: IP- Inpatient   Admission Date: 3/14/2024  Length of Stay: 2 days  Attending Physician: Irma Patel MD  Primary Care Provider: Hunter Aguilar III, MD        Subjective:     Principal Problem:Ulcerative colitis        HPI:  49 year old with a past medicine history of ulcertive colitis, DM, obesity, HLD, HTN, C diff whom presents to the emergency department with reported bloody diarrhea over last 4 days  with associated nausea, vomiting, worsening abdominal pain patient has a history of ulcerative colitis who history of C diff in the past he sees Dr. Adama giron he receives regular infusions and is on budesonide on a daily basis as well despite these medications his symptoms have flared he denies any recorded fever no urinary symptoms reported. Patient reports having 4-6 bloody diarrhea stools a day, on daily prednisone with no relief.   In ER, Dilaudid, zofran and IV fluids started, WBC 15, CT abdomen with colitis-proctitis of the distal descending colon, sigmoid colon and rectum, nonspecific but likely of an acute infectious etiology.Admit to hospital medicine , IV solumedrol TID. Waiting stool panel and rule out c diff before starting abx.     Overview/Hospital Course:  Patient was admitted with ulcerative colitis flare.  He was placed on IV Solu-Medrol and GI was consulted.  Stool for occult blood was positive.  C diff was negative.  He was monitored closely during his stay.  His pain was controlled with both oral and IV narcotics p.r.n..  He underwent bowel prep for colonoscopy.    Interval History:  Patient seen and examined.  No acute events overnight.  Colonoscopy reveals severe ulcerative colitis.  Still has abdominal pain, but reports this is a little better today.  His stools have slowed down.  No further nausea and vomiting.      Review of Systems   Gastrointestinal:   Positive for abdominal pain, blood in stool and diarrhea.   All other systems reviewed and are negative.    Objective:     Vital Signs (Most Recent):  Temp: 97.8 °F (36.6 °C) (03/17/24 0427)  Pulse: 64 (03/17/24 0427)  Resp: 18 (03/17/24 0558)  BP: (!) 161/87 (03/17/24 0427)  SpO2: 96 % (03/17/24 0427) Vital Signs (24h Range):  Temp:  [97.8 °F (36.6 °C)-98.7 °F (37.1 °C)] 97.8 °F (36.6 °C)  Pulse:  [64-76] 64  Resp:  [] 18  SpO2:  [94 %-99 %] 96 %  BP: (133-161)/(66-87) 161/87     Weight: 124.9 kg (275 lb 4.8 oz)  Body mass index is 45.81 kg/m².    Intake/Output Summary (Last 24 hours) at 3/17/2024 0952  Last data filed at 3/17/2024 0853  Gross per 24 hour   Intake 1330 ml   Output --   Net 1330 ml           Physical Exam  Constitutional:       General: He is not in acute distress.     Appearance: He is obese. He is not ill-appearing, toxic-appearing or diaphoretic.   Cardiovascular:      Rate and Rhythm: Normal rate and regular rhythm.      Pulses: Normal pulses.      Heart sounds: Normal heart sounds.   Pulmonary:      Effort: Pulmonary effort is normal. No respiratory distress.      Breath sounds: Normal breath sounds.   Abdominal:      General: Bowel sounds are normal. There is no distension.      Palpations: Abdomen is soft.      Tenderness: There is abdominal tenderness (generalized ttp). There is no guarding.   Skin:     General: Skin is warm and dry.      Coloration: Skin is not pale.      Findings: No erythema.   Neurological:      Mental Status: He is alert and oriented to person, place, and time. Mental status is at baseline.             Significant Labs: All pertinent labs within the past 24 hours have been reviewed.  CBC:   Recent Labs   Lab 03/16/24  0937   WBC 13.65*   HGB 13.7*   HCT 42.5          CMP:   Recent Labs   Lab 03/16/24  1404      K 4.9      CO2 25   *   BUN 19   CREATININE 1.0   CALCIUM 9.3   PROT 6.9   ALBUMIN 3.4*   BILITOT 0.3   ALKPHOS 72   AST 10   ALT  "17   ANIONGAP 9         Significant Imaging: I have reviewed all pertinent imaging results/findings within the past 24 hours.    Assessment/Plan:      * Ulcerative colitis  Multiple bloody diarrhea daily  Trend h/h  IV solumedrol 60 mg TID  IV fluids  Pain control  CT abdomen: colitis-proctitis of the distal descending colon, sigmoid colon and rectum, nonspecific but likely of an acute infectious etiology.  GI consulted  Colonoscopy - severe UC  Diabetic diet     Leukocytosis  WBC 15.04  Hold abx coverage for now until stool  panel returns.   Trend.   Patient on home prednisone, start IV solumderol TID  Lactic acid level normal  Improved today - likely 2/2 to steroids    Type 2 diabetes mellitus without complication, without long-term current use of insulin  Patient's FSGs are controlled on current medication regimen.  Last A1c reviewed-   Lab Results   Component Value Date    HGBA1C 5.2 10/27/2023     Most recent fingerstick glucose reviewed- No results for input(s): "POCTGLUCOSE" in the last 24 hours.  Current correctional scale  High  Maintain anti-hyperglycemic dose as follows-   Antihyperglycemics (From admission, onward)      Start     Stop Route Frequency Ordered    03/14/24 2139  insulin aspart U-100 pen 0-5 Units         -- SubQ Before meals & nightly PRN 03/14/24 2040          Hold Oral hypoglycemics while patient is in the hospital.    Morbid obesity  Body mass index is 44.93 kg/m². Morbid obesity complicates all aspects of disease management from diagnostic modalities to treatment. Weight loss encouraged and health benefits explained to patient.           VTE Risk Mitigation (From admission, onward)           Ordered     Reason for No Pharmacological VTE Prophylaxis  Once        Question:  Reasons:  Answer:  Active Bleeding    03/14/24 2040     IP VTE HIGH RISK PATIENT  Once         03/14/24 2040     Place sequential compression device  Until discontinued         03/14/24 2040              "       Discharge Planning   GERARDO: 3/16/2024     Code Status: Full Code   Is the patient medically ready for discharge?:     Reason for patient still in hospital (select all that apply): Patient trending condition and Treatment  Discharge Plan A: Home   Discharge Delays: None known at this time              Rosalind Barrett NP  Department of Hospital Medicine   [facility]

## 2024-03-17 NOTE — ASSESSMENT & PLAN NOTE
"61M with h/o htn and obesity (BMI 42) admitted 9/12 with acute on chronic respiratory failure; baseline severe MOTLEY and orthopnea. Traveling from [place] to go on a cruise and symptoms started prior to getting to [place] or going on cruise. CT- ground glass consolidation b/l. procal elevated. resp culture- normal luther, rare wbc. 2d echo with PAP 34. Hiv, hep c negReceived 3d azithromycin and then cefepime or ceftriaxone since admit. Wbc 16k 84% gran. Lft downtrending. ID consulted for "Rhinovirus, secondary bacteria pneumonia    Unusual to be this sick from rhinovirus. Agree he likely has another process going on, possible bacterial or fungal infection. Has already received abx for atypical pna.  Primary team ruling out malignancy. Unclear cause of chronic resp symptoms; pulm htn?    Recommendations:   - continue ceftriaxone  - fungal studies ordered- crypto, histo, blasto  - doubt TB, but NTM in ddx, send AFB smear/culture  - karius testing ordered  - non-infectious work up by primary team/pulm  - not opposed to trial of steroids if recommended by pulm  - f/u pulm plan for bronchoscopy    BAL studies:   - cell count with diff  - gram stain, bacterial / legionella / fungal / AFB / modified AFB for nocardia  - TEM-respiratory pathogens panel  - Aspergillus Ag  - MTB PCR  - Pneumocystis PCR  - CMV PCR  - miscellaneous send out - [id number] send to [facility]   " WBC 15.04  Hold abx coverage for now until stool  panel returns.   Trend.   Patient on home prednisone, start IV solumderol TID  Lactic acid level normal  Improved today - likely 2/2 to steroids

## 2024-03-17 NOTE — PROGRESS NOTES
[facility]  Gastroenterology  Progress Note    Patient Name: Jacoby Jean-Baptiste  MRN: [id number]  Admission Date: 3/14/2024  Hospital Length of Stay: 2 days  Code Status: Full Code   Attending Provider: Irma Patel MD  Consulting Provider: BALJINDER Ramos MD  Primary Care Physician: Hunter Aguilar III, MD  Principal Problem: Ulcerative colitis    Subjective:     Chief Complaint: Bloody diarrhea    Interval History: Minimal amount of stool since the colonoscopy yesterday.  No blood.  Feeling better.    Review of Systems:  No F/C  No CP  No SOB      Objective:     Vital Signs (Most Recent):  Temp: 97.9 °F (36.6 °C) (03/17/24 1140)  Pulse: 72 (03/17/24 1140)  Resp: 18 (03/17/24 1140)  BP: (!) 149/91 (03/17/24 1140)  SpO2: 95 % (03/17/24 1140) Vital Signs (24h Range):  Temp:  [97.8 °F (36.6 °C)-98.2 °F (36.8 °C)] 97.9 °F (36.6 °C)  Pulse:  [64-72] 72  Resp:  [] 18  SpO2:  [94 %-99 %] 95 %  BP: (133-161)/(66-91) 149/91     Weight: 124.9 kg (275 lb 4.8 oz) (03/14/24 2100)      Intake/Output Summary (Last 24 hours) at 3/17/2024 1431  Last data filed at 3/17/2024 1229  Gross per 24 hour   Intake 1230 ml   Output --   Net 1230 ml       Lines/Drains/Airways       Peripheral Intravenous Line  Duration                  Peripheral IV - Single Lumen 03/14/24 1620 20 G;1 3/4 in Anterior;Right Forearm 2 days                    Physical Exam:  NAD  Anicteric, EOMI  MMM, NC  RRR; no burt  No wheezes, no rhonchi  Soft, nondistended, nontender, +ABS  No c/c  No rash, no ulcer  Afocal, moves all ext.  AAOx 4, affect wnl      Significant Labs:  CBC:   Recent Labs   Lab 03/16/24  0937 03/17/24  1045   WBC 13.65* 11.44   HGB 13.7* 11.9*   HCT 42.5 36.6*    373     CMP:   Recent Labs   Lab 03/17/24  1045   *   CALCIUM 9.2   ALBUMIN 3.5   PROT 6.9      K 4.7   CO2 26      BUN 20   CREATININE 1.0   ALKPHOS 68   ALT 19   AST 11   BILITOT 0.3     Coagulation: No results for input(s):  ""PT", "INR", "APTT" in the last 48 hours.      Significant Imaging:  Imaging reviewed in Epic.      Assessment/Plan:     Severe UC - improving    - Continue entyvio and likely change to q4wk dosing as an outpt  - Continue methylprednisolone for now.  - If he continue to improve, change to PO prednisone tomorrow.    - Close GI follow up  - We will check back tomorrow      L Jj Ramos MD  Gastroenterology  [facility]   "

## 2024-03-17 NOTE — SUBJECTIVE & OBJECTIVE
Interval History:  Patient seen and examined.  No acute events overnight.  Colonoscopy reveals severe ulcerative colitis.  Still has abdominal pain, but reports this is a little better today.  His stools have slowed down.  No further nausea and vomiting.      Review of Systems   Gastrointestinal:  Positive for abdominal pain, blood in stool and diarrhea.   All other systems reviewed and are negative.    Objective:     Vital Signs (Most Recent):  Temp: 97.8 °F (36.6 °C) (03/17/24 0427)  Pulse: 64 (03/17/24 0427)  Resp: 18 (03/17/24 0558)  BP: (!) 161/87 (03/17/24 0427)  SpO2: 96 % (03/17/24 0427) Vital Signs (24h Range):  Temp:  [97.8 °F (36.6 °C)-98.7 °F (37.1 °C)] 97.8 °F (36.6 °C)  Pulse:  [64-76] 64  Resp:  [] 18  SpO2:  [94 %-99 %] 96 %  BP: (133-161)/(66-87) 161/87     Weight: 124.9 kg (275 lb 4.8 oz)  Body mass index is 45.81 kg/m².    Intake/Output Summary (Last 24 hours) at 3/17/2024 0952  Last data filed at 3/17/2024 0853  Gross per 24 hour   Intake 1330 ml   Output --   Net 1330 ml           Physical Exam  Constitutional:       General: He is not in acute distress.     Appearance: He is obese. He is not ill-appearing, toxic-appearing or diaphoretic.   Cardiovascular:      Rate and Rhythm: Normal rate and regular rhythm.      Pulses: Normal pulses.      Heart sounds: Normal heart sounds.   Pulmonary:      Effort: Pulmonary effort is normal. No respiratory distress.      Breath sounds: Normal breath sounds.   Abdominal:      General: Bowel sounds are normal. There is no distension.      Palpations: Abdomen is soft.      Tenderness: There is abdominal tenderness (generalized ttp). There is no guarding.   Skin:     General: Skin is warm and dry.      Coloration: Skin is not pale.      Findings: No erythema.   Neurological:      Mental Status: He is alert and oriented to person, place, and time. Mental status is at baseline.             Significant Labs: All pertinent labs within the past 24 hours have  been reviewed.  CBC:   Recent Labs   Lab 03/16/24  0937   WBC 13.65*   HGB 13.7*   HCT 42.5          CMP:   Recent Labs   Lab 03/16/24  1404      K 4.9      CO2 25   *   BUN 19   CREATININE 1.0   CALCIUM 9.3   PROT 6.9   ALBUMIN 3.4*   BILITOT 0.3   ALKPHOS 72   AST 10   ALT 17   ANIONGAP 9         Significant Imaging: I have reviewed all pertinent imaging results/findings within the past 24 hours.

## 2024-03-17 NOTE — NURSING
"0815 spoke with lyle in lab regarding morning labs. She states," it is on the list. They will get to it."  "

## 2024-03-18 LAB
ALBUMIN SERPL BCP-MCNC: 3.4 G/DL (ref 3.5–5.2)
ALP SERPL-CCNC: 59 U/L (ref 55–135)
ALT SERPL W/O P-5'-P-CCNC: 20 U/L (ref 10–44)
ANA PANEL SEE BELOW:: NORMAL
ANION GAP SERPL CALC-SCNC: 7 MMOL/L (ref 8–16)
AST SERPL-CCNC: 11 U/L (ref 10–40)
BASOPHILS NFR BLD: 0 % (ref 0–1.9)
BILIRUB SERPL-MCNC: 0.3 MG/DL (ref 0.1–1)
BUN SERPL-MCNC: 23 MG/DL (ref 6–20)
CALCIUM SERPL-MCNC: 9.3 MG/DL (ref 8.7–10.5)
CENTROMERE B AB SER-ACNC: <0.2 AI (ref 0–0.9)
CHLORIDE SERPL-SCNC: 102 MMOL/L (ref 95–110)
CHROMATIN AB SERPL-ACNC: <0.2 AI (ref 0–0.9)
CO2 SERPL-SCNC: 32 MMOL/L (ref 23–29)
CREAT SERPL-MCNC: 0.9 MG/DL (ref 0.5–1.4)
DIFFERENTIAL METHOD BLD: ABNORMAL
DSDNA AB SER-ACNC: 1 IU/ML (ref 0–9)
ENA JO1 AB SER-ACNC: <0.2 AI (ref 0–0.9)
ENA RNP AB SER-ACNC: 0.7 AI (ref 0–0.9)
ENA SCL70 AB SER-ACNC: <0.2 AI (ref 0–0.9)
ENA SM AB SER-ACNC: <0.2 AI (ref 0–0.9)
ENA SS-A AB SER-ACNC: <0.2 AI (ref 0–0.9)
ENA SS-B AB SER-ACNC: <0.2 AI (ref 0–0.9)
EOSINOPHIL NFR BLD: 0 % (ref 0–8)
ERYTHROCYTE [DISTWIDTH] IN BLOOD BY AUTOMATED COUNT: 13.6 % (ref 11.5–14.5)
EST. GFR  (NO RACE VARIABLE): >60 ML/MIN/1.73 M^2
GLUCOSE SERPL-MCNC: 150 MG/DL (ref 70–110)
GLUCOSE SERPL-MCNC: 157 MG/DL (ref 70–110)
GLUCOSE SERPL-MCNC: 158 MG/DL (ref 70–110)
GLUCOSE SERPL-MCNC: 159 MG/DL (ref 70–110)
GLUCOSE SERPL-MCNC: 198 MG/DL (ref 70–110)
HCT VFR BLD AUTO: 38.5 % (ref 40–54)
HGB BLD-MCNC: 12.7 G/DL (ref 14–18)
IMM GRANULOCYTES # BLD AUTO: ABNORMAL K/UL (ref 0–0.04)
IMM GRANULOCYTES NFR BLD AUTO: ABNORMAL % (ref 0–0.5)
LYMPHOCYTES NFR BLD: 18 % (ref 18–48)
MAGNESIUM SERPL-MCNC: 1.8 MG/DL (ref 1.6–2.6)
MCH RBC QN AUTO: 32.6 PG (ref 27–31)
MCHC RBC AUTO-ENTMCNC: 33 G/DL (ref 32–36)
MCV RBC AUTO: 99 FL (ref 82–98)
MONOCYTES NFR BLD: 2 % (ref 4–15)
NEUTROPHILS NFR BLD: 77 % (ref 38–73)
NEUTS BAND NFR BLD MANUAL: 3 %
NRBC BLD-RTO: 0 /100 WBC
PHOSPHATE SERPL-MCNC: 1.5 MG/DL (ref 2.7–4.5)
PLATELET # BLD AUTO: 358 K/UL (ref 150–450)
PLATELET BLD QL SMEAR: ABNORMAL
PMV BLD AUTO: 9.4 FL (ref 9.2–12.9)
POTASSIUM SERPL-SCNC: 5.1 MMOL/L (ref 3.5–5.1)
PROT SERPL-MCNC: 6.5 G/DL (ref 6–8.4)
RBC # BLD AUTO: 3.89 M/UL (ref 4.6–6.2)
SODIUM SERPL-SCNC: 141 MMOL/L (ref 136–145)
WBC # BLD AUTO: 14.88 K/UL (ref 3.9–12.7)

## 2024-03-18 PROCEDURE — 25000003 PHARM REV CODE 250: Performed by: NURSE PRACTITIONER

## 2024-03-18 PROCEDURE — 36415 COLL VENOUS BLD VENIPUNCTURE: CPT | Performed by: INTERNAL MEDICINE

## 2024-03-18 PROCEDURE — 85027 COMPLETE CBC AUTOMATED: CPT | Performed by: INTERNAL MEDICINE

## 2024-03-18 PROCEDURE — 25000003 PHARM REV CODE 250: Performed by: INTERNAL MEDICINE

## 2024-03-18 PROCEDURE — 80053 COMPREHEN METABOLIC PANEL: CPT | Performed by: INTERNAL MEDICINE

## 2024-03-18 PROCEDURE — 85007 BL SMEAR W/DIFF WBC COUNT: CPT | Performed by: INTERNAL MEDICINE

## 2024-03-18 PROCEDURE — 25000003 PHARM REV CODE 250: Performed by: HOSPITALIST

## 2024-03-18 PROCEDURE — 83735 ASSAY OF MAGNESIUM: CPT | Performed by: INTERNAL MEDICINE

## 2024-03-18 PROCEDURE — 84100 ASSAY OF PHOSPHORUS: CPT | Performed by: NURSE PRACTITIONER

## 2024-03-18 PROCEDURE — 63600175 PHARM REV CODE 636 W HCPCS: Performed by: INTERNAL MEDICINE

## 2024-03-18 PROCEDURE — 82962 GLUCOSE BLOOD TEST: CPT

## 2024-03-18 PROCEDURE — 21400001 HC TELEMETRY ROOM

## 2024-03-18 PROCEDURE — 36415 COLL VENOUS BLD VENIPUNCTURE: CPT | Performed by: NURSE PRACTITIONER

## 2024-03-18 RX ORDER — COLESEVELAM 180 1/1
1875 TABLET ORAL 2 TIMES DAILY WITH MEALS
Status: DISCONTINUED | OUTPATIENT
Start: 2024-03-18 | End: 2024-03-21 | Stop reason: HOSPADM

## 2024-03-18 RX ORDER — DIPHENOXYLATE HYDROCHLORIDE AND ATROPINE SULFATE 2.5; .025 MG/1; MG/1
1 TABLET ORAL 4 TIMES DAILY PRN
Status: DISCONTINUED | OUTPATIENT
Start: 2024-03-18 | End: 2024-03-21 | Stop reason: HOSPADM

## 2024-03-18 RX ORDER — HYDROCODONE BITARTRATE AND ACETAMINOPHEN 10; 325 MG/1; MG/1
1 TABLET ORAL EVERY 6 HOURS PRN
Qty: 20 TABLET | Refills: 0 | Status: SHIPPED | OUTPATIENT
Start: 2024-03-18 | End: 2024-04-09

## 2024-03-18 RX ORDER — PREDNISONE 20 MG/1
40 TABLET ORAL DAILY
Qty: 20 TABLET | Refills: 0 | Status: SHIPPED | OUTPATIENT
Start: 2024-03-18 | End: 2024-03-21

## 2024-03-18 RX ADMIN — BUSPIRONE HYDROCHLORIDE 7.5 MG: 5 TABLET ORAL at 02:03

## 2024-03-18 RX ADMIN — LOPERAMIDE HYDROCHLORIDE 2 MG: 2 CAPSULE ORAL at 11:03

## 2024-03-18 RX ADMIN — ALUMINUM HYDROXIDE, MAGNESIUM HYDROXIDE, AND SIMETHICONE 30 ML: 1200; 120; 1200 SUSPENSION ORAL at 05:03

## 2024-03-18 RX ADMIN — METOPROLOL SUCCINATE 50 MG: 50 TABLET, EXTENDED RELEASE ORAL at 08:03

## 2024-03-18 RX ADMIN — DIPHENOXYLATE HYDROCHLORIDE AND ATROPINE SULFATE 1 TABLET: 2.5; .025 TABLET ORAL at 09:03

## 2024-03-18 RX ADMIN — METHYLPREDNISOLONE SODIUM SUCCINATE 60 MG: 40 INJECTION, POWDER, FOR SOLUTION INTRAMUSCULAR; INTRAVENOUS at 05:03

## 2024-03-18 RX ADMIN — SERTRALINE HYDROCHLORIDE 100 MG: 50 TABLET ORAL at 08:03

## 2024-03-18 RX ADMIN — LORAZEPAM 0.5 MG: 0.5 TABLET ORAL at 09:03

## 2024-03-18 RX ADMIN — DIPHENOXYLATE HYDROCHLORIDE AND ATROPINE SULFATE 1 TABLET: 2.5; .025 TABLET ORAL at 12:03

## 2024-03-18 RX ADMIN — ATORVASTATIN CALCIUM 10 MG: 10 TABLET, FILM COATED ORAL at 09:03

## 2024-03-18 RX ADMIN — Medication 800 MG: at 08:03

## 2024-03-18 RX ADMIN — METHYLPREDNISOLONE SODIUM SUCCINATE 60 MG: 40 INJECTION, POWDER, FOR SOLUTION INTRAMUSCULAR; INTRAVENOUS at 09:03

## 2024-03-18 RX ADMIN — ALUMINUM HYDROXIDE, MAGNESIUM HYDROXIDE, AND SIMETHICONE 30 ML: 1200; 120; 1200 SUSPENSION ORAL at 11:03

## 2024-03-18 RX ADMIN — Medication 800 MG: at 12:03

## 2024-03-18 RX ADMIN — ONDANSETRON 4 MG: 2 INJECTION INTRAMUSCULAR; INTRAVENOUS at 09:03

## 2024-03-18 RX ADMIN — METHYLPREDNISOLONE SODIUM SUCCINATE 60 MG: 40 INJECTION, POWDER, FOR SOLUTION INTRAMUSCULAR; INTRAVENOUS at 02:03

## 2024-03-18 RX ADMIN — POTASSIUM, SODIUM PHOSPHATES 280 MG-160 MG-250 MG ORAL POWDER PACKET 2 PACKET: POWDER IN PACKET at 06:03

## 2024-03-18 RX ADMIN — HYDROCODONE BITARTRATE AND ACETAMINOPHEN 1 TABLET: 10; 325 TABLET ORAL at 01:03

## 2024-03-18 RX ADMIN — PANTOPRAZOLE SODIUM 40 MG: 40 TABLET, DELAYED RELEASE ORAL at 05:03

## 2024-03-18 RX ADMIN — HYDROCODONE BITARTRATE AND ACETAMINOPHEN 1 TABLET: 10; 325 TABLET ORAL at 09:03

## 2024-03-18 RX ADMIN — HYDROCODONE BITARTRATE AND ACETAMINOPHEN 1 TABLET: 10; 325 TABLET ORAL at 05:03

## 2024-03-18 RX ADMIN — BUSPIRONE HYDROCHLORIDE 7.5 MG: 5 TABLET ORAL at 09:03

## 2024-03-18 RX ADMIN — POTASSIUM, SODIUM PHOSPHATES 280 MG-160 MG-250 MG ORAL POWDER PACKET 2 PACKET: POWDER IN PACKET at 12:03

## 2024-03-18 RX ADMIN — POTASSIUM, SODIUM PHOSPHATES 280 MG-160 MG-250 MG ORAL POWDER PACKET 2 PACKET: POWDER IN PACKET at 05:03

## 2024-03-18 RX ADMIN — COLESEVELAM 1875 MG: 180 TABLET ORAL at 05:03

## 2024-03-18 RX ADMIN — HYDROCODONE BITARTRATE AND ACETAMINOPHEN 1 TABLET: 10; 325 TABLET ORAL at 02:03

## 2024-03-18 RX ADMIN — ZOLPIDEM TARTRATE 10 MG: 5 TABLET, COATED ORAL at 09:03

## 2024-03-18 RX ADMIN — LOPERAMIDE HYDROCHLORIDE 2 MG: 2 CAPSULE ORAL at 05:03

## 2024-03-18 RX ADMIN — BUSPIRONE HYDROCHLORIDE 7.5 MG: 5 TABLET ORAL at 08:03

## 2024-03-18 NOTE — PROGRESS NOTES
[facility] Medicine  Progress Note    Patient Name: Jacoby Jean-Baptiste  MRN: [id number]  Patient Class: IP- Inpatient   Admission Date: 3/14/2024  Length of Stay: 3 days  Attending Physician: Irma Patel MD  Primary Care Provider: Hunter Aguilar III, MD        Subjective:     Principal Problem:Ulcerative colitis        HPI:  49 year old with a past medicine history of ulcertive colitis, DM, obesity, HLD, HTN, C diff whom presents to the emergency department with reported bloody diarrhea over last 4 days  with associated nausea, vomiting, worsening abdominal pain patient has a history of ulcerative colitis who history of C diff in the past he sees Dr. Adama giron he receives regular infusions and is on budesonide on a daily basis as well despite these medications his symptoms have flared he denies any recorded fever no urinary symptoms reported. Patient reports having 4-6 bloody diarrhea stools a day, on daily prednisone with no relief.   In ER, Dilaudid, zofran and IV fluids started, WBC 15, CT abdomen with colitis-proctitis of the distal descending colon, sigmoid colon and rectum, nonspecific but likely of an acute infectious etiology.Admit to hospital medicine , IV solumedrol TID. Waiting stool panel and rule out c diff before starting abx.     Overview/Hospital Course:  Patient was admitted with ulcerative colitis flare.  He was placed on IV Solu-Medrol and GI was consulted.  Stool for occult blood was positive.  C diff was negative.  He was monitored closely during his stay.  His pain was controlled with both oral and IV narcotics p.r.n..  He underwent bowel prep for colonoscopy.    Interval History:  Patient seen and examined. Not feeling any better today.  Started having more diarrhea today.  Feeling nauseated and has abdominal pain.     Review of Systems   Gastrointestinal:  Positive for abdominal pain, blood in stool and diarrhea.   All other systems reviewed and are  negative.    Objective:     Vital Signs (Most Recent):  Temp: 97.9 °F (36.6 °C) (03/18/24 0736)  Pulse: 67 (03/18/24 0736)  Resp: 18 (03/18/24 0736)  BP: (!) 157/81 (03/18/24 0736)  SpO2: 96 % (03/18/24 0736) Vital Signs (24h Range):  Temp:  [97.5 °F (36.4 °C)-98.5 °F (36.9 °C)] 97.9 °F (36.6 °C)  Pulse:  [61-72] 67  Resp:  [16-18] 18  SpO2:  [95 %-98 %] 96 %  BP: (139-167)/(71-91) 157/81     Weight: 124.9 kg (275 lb 4.8 oz)  Body mass index is 45.81 kg/m².    Intake/Output Summary (Last 24 hours) at 3/18/2024 0929  Last data filed at 3/18/2024 0831  Gross per 24 hour   Intake 870 ml   Output --   Net 870 ml           Physical Exam  Constitutional:       General: He is not in acute distress.     Appearance: He is obese. He is not ill-appearing, toxic-appearing or diaphoretic.   Cardiovascular:      Rate and Rhythm: Normal rate and regular rhythm.      Pulses: Normal pulses.      Heart sounds: Normal heart sounds.   Pulmonary:      Effort: Pulmonary effort is normal. No respiratory distress.      Breath sounds: Normal breath sounds.   Abdominal:      General: Bowel sounds are normal. There is no distension.      Palpations: Abdomen is soft.      Tenderness: There is abdominal tenderness (generalized ttp). There is no guarding.   Skin:     General: Skin is warm and dry.      Coloration: Skin is not pale.      Findings: No erythema.   Neurological:      Mental Status: He is alert and oriented to person, place, and time. Mental status is at baseline.             Significant Labs: All pertinent labs within the past 24 hours have been reviewed.  CBC:   Recent Labs   Lab 03/16/24  0937 03/17/24  1045 03/18/24  0509   WBC 13.65* 11.44 14.88*   HGB 13.7* 11.9* 12.7*   HCT 42.5 36.6* 38.5*    373 358       CMP:   Recent Labs   Lab 03/16/24  1404 03/17/24  1045 03/18/24  0509    136 141   K 4.9 4.7 5.1    102 102   CO2 25 26 32*   * 167* 157*   BUN 19 20 23*   CREATININE 1.0 1.0 0.9   CALCIUM 9.3 9.2  "9.3   PROT 6.9 6.9 6.5   ALBUMIN 3.4* 3.5 3.4*   BILITOT 0.3 0.3 0.3   ALKPHOS 72 68 59   AST 10 11 11   ALT 17 19 20   ANIONGAP 9 8 7*         Significant Imaging: I have reviewed all pertinent imaging results/findings within the past 24 hours.    Assessment/Plan:      * Ulcerative colitis  Multiple bloody diarrhea daily  Trend h/h - stable  IV solumedrol 60 mg TID  IV fluids  Pain control  CT abdomen: colitis-proctitis of the distal descending colon, sigmoid colon and rectum, nonspecific but likely of an acute infectious etiology.  GI consulted  Colonoscopy - severe UC  Diabetic diet   Loperamide for diarrhea not working.  Will add Welchol and Lomotil p.r.n.  Antiemetics prn    Leukocytosis  WBC 15.04  Hold abx coverage for now until stool  panel returns.   Trend.   Patient on home prednisone, start IV solumderol TID  Lactic acid level normal  Improved today - likely 2/2 to steroids    Type 2 diabetes mellitus without complication, without long-term current use of insulin  Patient's FSGs are controlled on current medication regimen.  Last A1c reviewed-   Lab Results   Component Value Date    HGBA1C 5.2 10/27/2023     Most recent fingerstick glucose reviewed- No results for input(s): "POCTGLUCOSE" in the last 24 hours.  Current correctional scale  High  Maintain anti-hyperglycemic dose as follows-   Antihyperglycemics (From admission, onward)      Start     Stop Route Frequency Ordered    03/14/24 2139  insulin aspart U-100 pen 0-5 Units         -- SubQ Before meals & nightly PRN 03/14/24 2040          Hold Oral hypoglycemics while patient is in the hospital.    Morbid obesity  Body mass index is 44.93 kg/m². Morbid obesity complicates all aspects of disease management from diagnostic modalities to treatment. Weight loss encouraged and health benefits explained to patient.           VTE Risk Mitigation (From admission, onward)           Ordered     Reason for No Pharmacological VTE Prophylaxis  Once      "   Question:  Reasons:  Answer:  Active Bleeding    03/14/24 2040     IP VTE HIGH RISK PATIENT  Once         03/14/24 2040     Place sequential compression device  Until discontinued         03/14/24 2040                    Discharge Planning   GERARDO: 3/18/2024     Code Status: Full Code   Is the patient medically ready for discharge?:     Reason for patient still in hospital (select all that apply): Patient trending condition, Treatment, and Consult recommendations  Discharge Plan A: Home   Discharge Delays: (!) Waiting for Provider to Speak to Patient              Rosalind Barrett NP  Department of Hospital Medicine   [facility]

## 2024-03-18 NOTE — PLAN OF CARE
Discharge held d/t continued abd pain and diarrhea - CM following     03/18/24 1123   Discharge Reassessment   Assessment Type Discharge Planning Reassessment   Did the patient's condition or plan change since previous assessment? No   Discharge Plan A Home   Why the patient remains in the hospital Requires continued medical care

## 2024-03-18 NOTE — SUBJECTIVE & OBJECTIVE
Interval History:  Patient seen and examined. Not feeling any better today.  Started having more diarrhea today.  Feeling nauseated and has abdominal pain.     Review of Systems   Gastrointestinal:  Positive for abdominal pain, blood in stool and diarrhea.   All other systems reviewed and are negative.    Objective:     Vital Signs (Most Recent):  Temp: 97.9 °F (36.6 °C) (03/18/24 0736)  Pulse: 67 (03/18/24 0736)  Resp: 18 (03/18/24 0736)  BP: (!) 157/81 (03/18/24 0736)  SpO2: 96 % (03/18/24 0736) Vital Signs (24h Range):  Temp:  [97.5 °F (36.4 °C)-98.5 °F (36.9 °C)] 97.9 °F (36.6 °C)  Pulse:  [61-72] 67  Resp:  [16-18] 18  SpO2:  [95 %-98 %] 96 %  BP: (139-167)/(71-91) 157/81     Weight: 124.9 kg (275 lb 4.8 oz)  Body mass index is 45.81 kg/m².    Intake/Output Summary (Last 24 hours) at 3/18/2024 0929  Last data filed at 3/18/2024 0831  Gross per 24 hour   Intake 870 ml   Output --   Net 870 ml           Physical Exam  Constitutional:       General: He is not in acute distress.     Appearance: He is obese. He is not ill-appearing, toxic-appearing or diaphoretic.   Cardiovascular:      Rate and Rhythm: Normal rate and regular rhythm.      Pulses: Normal pulses.      Heart sounds: Normal heart sounds.   Pulmonary:      Effort: Pulmonary effort is normal. No respiratory distress.      Breath sounds: Normal breath sounds.   Abdominal:      General: Bowel sounds are normal. There is no distension.      Palpations: Abdomen is soft.      Tenderness: There is abdominal tenderness (generalized ttp). There is no guarding.   Skin:     General: Skin is warm and dry.      Coloration: Skin is not pale.      Findings: No erythema.   Neurological:      Mental Status: He is alert and oriented to person, place, and time. Mental status is at baseline.             Significant Labs: All pertinent labs within the past 24 hours have been reviewed.  CBC:   Recent Labs   Lab 03/16/24  0937 03/17/24  1045 03/18/24  0509   WBC 13.65* 11.44  What Type Of Note Output Would You Prefer (Optional)?: Standard Output 14.88*   HGB 13.7* 11.9* 12.7*   HCT 42.5 36.6* 38.5*    373 358       CMP:   Recent Labs   Lab 03/16/24  1404 03/17/24  1045 03/18/24  0509    136 141   K 4.9 4.7 5.1    102 102   CO2 25 26 32*   * 167* 157*   BUN 19 20 23*   CREATININE 1.0 1.0 0.9   CALCIUM 9.3 9.2 9.3   PROT 6.9 6.9 6.5   ALBUMIN 3.4* 3.5 3.4*   BILITOT 0.3 0.3 0.3   ALKPHOS 72 68 59   AST 10 11 11   ALT 17 19 20   ANIONGAP 9 8 7*         Significant Imaging: I have reviewed all pertinent imaging results/findings within the past 24 hours.   How Severe Is Your Skin Lesion?: mild Has Your Skin Lesion Been Treated?: not been treated Is This A New Presentation, Or A Follow-Up?: Skin Lesion Additional History: Patient states Dr. Vee did examine the lesion years ago and since then it had changed

## 2024-03-18 NOTE — ASSESSMENT & PLAN NOTE
Multiple bloody diarrhea daily  Trend h/h - stable  IV solumedrol 60 mg TID  IV fluids  Pain control  CT abdomen: colitis-proctitis of the distal descending colon, sigmoid colon and rectum, nonspecific but likely of an acute infectious etiology.  GI consulted  Colonoscopy - severe UC  Diabetic diet   Loperamide for diarrhea  Antiemetics prn

## 2024-03-18 NOTE — ASSESSMENT & PLAN NOTE
Multiple bloody diarrhea daily  Trend h/h - stable  IV solumedrol 60 mg TID  IV fluids  Pain control  CT abdomen: colitis-proctitis of the distal descending colon, sigmoid colon and rectum, nonspecific but likely of an acute infectious etiology.  GI consulted  Colonoscopy - severe UC  Diabetic diet   Loperamide for diarrhea not working.  Will add Welchol and Lomotil p.r.n.  Antiemetics prn

## 2024-03-19 LAB
ALBUMIN SERPL BCP-MCNC: 3.1 G/DL (ref 3.5–5.2)
ALP SERPL-CCNC: 53 U/L (ref 55–135)
ALT SERPL W/O P-5'-P-CCNC: 22 U/L (ref 10–44)
ANION GAP SERPL CALC-SCNC: 4 MMOL/L (ref 8–16)
AST SERPL-CCNC: 9 U/L (ref 10–40)
BASOPHILS # BLD AUTO: ABNORMAL K/UL (ref 0–0.2)
BASOPHILS NFR BLD: 0 % (ref 0–1.9)
BILIRUB SERPL-MCNC: 0.3 MG/DL (ref 0.1–1)
BUN SERPL-MCNC: 22 MG/DL (ref 6–20)
CALCIUM SERPL-MCNC: 8.6 MG/DL (ref 8.7–10.5)
CHLORIDE SERPL-SCNC: 99 MMOL/L (ref 95–110)
CO2 SERPL-SCNC: 33 MMOL/L (ref 23–29)
CREAT SERPL-MCNC: 1.1 MG/DL (ref 0.5–1.4)
DIFFERENTIAL METHOD BLD: ABNORMAL
EOSINOPHIL # BLD AUTO: ABNORMAL K/UL (ref 0–0.5)
EOSINOPHIL NFR BLD: 0 % (ref 0–8)
ERYTHROCYTE [DISTWIDTH] IN BLOOD BY AUTOMATED COUNT: 13.5 % (ref 11.5–14.5)
EST. GFR  (NO RACE VARIABLE): >60 ML/MIN/1.73 M^2
GLUCOSE SERPL-MCNC: 129 MG/DL (ref 70–110)
GLUCOSE SERPL-MCNC: 146 MG/DL (ref 70–110)
GLUCOSE SERPL-MCNC: 155 MG/DL (ref 70–110)
GLUCOSE SERPL-MCNC: 157 MG/DL (ref 70–110)
GLUCOSE SERPL-MCNC: 221 MG/DL (ref 70–110)
HCT VFR BLD AUTO: 37.3 % (ref 40–54)
HGB BLD-MCNC: 12.2 G/DL (ref 14–18)
IMM GRANULOCYTES # BLD AUTO: ABNORMAL K/UL (ref 0–0.04)
IMM GRANULOCYTES NFR BLD AUTO: ABNORMAL % (ref 0–0.5)
LABCORP MISC TEST CODE: NORMAL
LABCORP MISC TEST NAME: NORMAL
LABCORP MISCELLANEOUS TEST: NORMAL
LYMPHOCYTES # BLD AUTO: ABNORMAL K/UL (ref 1–4.8)
LYMPHOCYTES NFR BLD: 16 % (ref 18–48)
MAGNESIUM SERPL-MCNC: 1.9 MG/DL (ref 1.6–2.6)
MCH RBC QN AUTO: 31.8 PG (ref 27–31)
MCHC RBC AUTO-ENTMCNC: 32.7 G/DL (ref 32–36)
MCV RBC AUTO: 97 FL (ref 82–98)
METAMYELOCYTES NFR BLD MANUAL: 1 %
MONOCYTES # BLD AUTO: ABNORMAL K/UL (ref 0.3–1)
MONOCYTES NFR BLD: 2 % (ref 4–15)
MYELOCYTES NFR BLD MANUAL: 2 %
NEUTROPHILS NFR BLD: 71 % (ref 38–73)
NEUTS BAND NFR BLD MANUAL: 8 %
NRBC BLD-RTO: 0 /100 WBC
PHOSPHATE SERPL-MCNC: 2.1 MG/DL (ref 2.7–4.5)
PLATELET # BLD AUTO: 366 K/UL (ref 150–450)
PLATELET BLD QL SMEAR: ABNORMAL
PMV BLD AUTO: 9.6 FL (ref 9.2–12.9)
POTASSIUM SERPL-SCNC: 5.2 MMOL/L (ref 3.5–5.1)
PROT SERPL-MCNC: 6 G/DL (ref 6–8.4)
RBC # BLD AUTO: 3.84 M/UL (ref 4.6–6.2)
SODIUM SERPL-SCNC: 136 MMOL/L (ref 136–145)
WBC # BLD AUTO: 14.58 K/UL (ref 3.9–12.7)

## 2024-03-19 PROCEDURE — 25000003 PHARM REV CODE 250: Performed by: HOSPITALIST

## 2024-03-19 PROCEDURE — 80053 COMPREHEN METABOLIC PANEL: CPT | Performed by: INTERNAL MEDICINE

## 2024-03-19 PROCEDURE — 25000003 PHARM REV CODE 250: Performed by: INTERNAL MEDICINE

## 2024-03-19 PROCEDURE — 25000003 PHARM REV CODE 250: Performed by: NURSE PRACTITIONER

## 2024-03-19 PROCEDURE — 85007 BL SMEAR W/DIFF WBC COUNT: CPT | Performed by: INTERNAL MEDICINE

## 2024-03-19 PROCEDURE — 83735 ASSAY OF MAGNESIUM: CPT | Performed by: INTERNAL MEDICINE

## 2024-03-19 PROCEDURE — 63600175 PHARM REV CODE 636 W HCPCS: Performed by: INTERNAL MEDICINE

## 2024-03-19 PROCEDURE — 36415 COLL VENOUS BLD VENIPUNCTURE: CPT | Performed by: INTERNAL MEDICINE

## 2024-03-19 PROCEDURE — 84100 ASSAY OF PHOSPHORUS: CPT | Performed by: NURSE PRACTITIONER

## 2024-03-19 PROCEDURE — 63600175 PHARM REV CODE 636 W HCPCS: Performed by: NURSE PRACTITIONER

## 2024-03-19 PROCEDURE — 21400001 HC TELEMETRY ROOM

## 2024-03-19 PROCEDURE — 85027 COMPLETE CBC AUTOMATED: CPT | Performed by: INTERNAL MEDICINE

## 2024-03-19 RX ORDER — SODIUM CHLORIDE 9 MG/ML
INJECTION, SOLUTION INTRAVENOUS CONTINUOUS
Status: DISCONTINUED | OUTPATIENT
Start: 2024-03-19 | End: 2024-03-21 | Stop reason: HOSPADM

## 2024-03-19 RX ADMIN — HYDROCODONE BITARTRATE AND ACETAMINOPHEN 1 TABLET: 10; 325 TABLET ORAL at 04:03

## 2024-03-19 RX ADMIN — BUSPIRONE HYDROCHLORIDE 7.5 MG: 5 TABLET ORAL at 08:03

## 2024-03-19 RX ADMIN — HYDROMORPHONE HYDROCHLORIDE 1 MG: 1 INJECTION, SOLUTION INTRAMUSCULAR; INTRAVENOUS; SUBCUTANEOUS at 11:03

## 2024-03-19 RX ADMIN — SODIUM CHLORIDE: 9 INJECTION, SOLUTION INTRAVENOUS at 08:03

## 2024-03-19 RX ADMIN — BUSPIRONE HYDROCHLORIDE 7.5 MG: 5 TABLET ORAL at 03:03

## 2024-03-19 RX ADMIN — DIPHENOXYLATE HYDROCHLORIDE AND ATROPINE SULFATE 1 TABLET: 2.5; .025 TABLET ORAL at 11:03

## 2024-03-19 RX ADMIN — HYDROCODONE BITARTRATE AND ACETAMINOPHEN 1 TABLET: 5; 325 TABLET ORAL at 04:03

## 2024-03-19 RX ADMIN — COLESEVELAM 1875 MG: 180 TABLET ORAL at 04:03

## 2024-03-19 RX ADMIN — HYDROCODONE BITARTRATE AND ACETAMINOPHEN 1 TABLET: 10; 325 TABLET ORAL at 09:03

## 2024-03-19 RX ADMIN — DIPHENOXYLATE HYDROCHLORIDE AND ATROPINE SULFATE 1 TABLET: 2.5; .025 TABLET ORAL at 04:03

## 2024-03-19 RX ADMIN — ACETAMINOPHEN 650 MG: 325 TABLET ORAL at 08:03

## 2024-03-19 RX ADMIN — ALUMINUM HYDROXIDE, MAGNESIUM HYDROXIDE, AND SIMETHICONE 30 ML: 1200; 120; 1200 SUSPENSION ORAL at 04:03

## 2024-03-19 RX ADMIN — ALUMINUM HYDROXIDE, MAGNESIUM HYDROXIDE, AND SIMETHICONE 30 ML: 1200; 120; 1200 SUSPENSION ORAL at 10:03

## 2024-03-19 RX ADMIN — ALUMINUM HYDROXIDE, MAGNESIUM HYDROXIDE, AND SIMETHICONE 30 ML: 1200; 120; 1200 SUSPENSION ORAL at 05:03

## 2024-03-19 RX ADMIN — LORAZEPAM 0.5 MG: 0.5 TABLET ORAL at 09:03

## 2024-03-19 RX ADMIN — ONDANSETRON 4 MG: 2 INJECTION INTRAMUSCULAR; INTRAVENOUS at 10:03

## 2024-03-19 RX ADMIN — ZOLPIDEM TARTRATE 10 MG: 5 TABLET, COATED ORAL at 09:03

## 2024-03-19 RX ADMIN — METHYLPREDNISOLONE SODIUM SUCCINATE 30 MG: 40 INJECTION, POWDER, FOR SOLUTION INTRAMUSCULAR; INTRAVENOUS at 08:03

## 2024-03-19 RX ADMIN — HYDROCODONE BITARTRATE AND ACETAMINOPHEN 1 TABLET: 5; 325 TABLET ORAL at 10:03

## 2024-03-19 RX ADMIN — ATORVASTATIN CALCIUM 10 MG: 10 TABLET, FILM COATED ORAL at 09:03

## 2024-03-19 RX ADMIN — PANTOPRAZOLE SODIUM 40 MG: 40 TABLET, DELAYED RELEASE ORAL at 05:03

## 2024-03-19 RX ADMIN — METHYLPREDNISOLONE SODIUM SUCCINATE 30 MG: 40 INJECTION, POWDER, FOR SOLUTION INTRAMUSCULAR; INTRAVENOUS at 09:03

## 2024-03-19 RX ADMIN — BUSPIRONE HYDROCHLORIDE 7.5 MG: 5 TABLET ORAL at 09:03

## 2024-03-19 RX ADMIN — SERTRALINE HYDROCHLORIDE 100 MG: 50 TABLET ORAL at 08:03

## 2024-03-19 RX ADMIN — METOPROLOL SUCCINATE 50 MG: 50 TABLET, EXTENDED RELEASE ORAL at 08:03

## 2024-03-19 RX ADMIN — COLESEVELAM 1875 MG: 180 TABLET ORAL at 08:03

## 2024-03-19 RX ADMIN — POTASSIUM, SODIUM PHOSPHATES 280 MG-160 MG-250 MG ORAL POWDER PACKET 2 PACKET: POWDER IN PACKET at 05:03

## 2024-03-19 RX ADMIN — PANTOPRAZOLE SODIUM 40 MG: 40 TABLET, DELAYED RELEASE ORAL at 04:03

## 2024-03-19 NOTE — PLAN OF CARE
Problem: Adjustment to Illness (Bowel Disease, Inflammatory)  Goal: Optimal Adaptation to Chronic Illness  Outcome: Ongoing, Progressing     Problem: Infection (Bowel Disease, Inflammatory)  Goal: Absence of Infection Signs and Symptoms  Outcome: Ongoing, Progressing     Problem: Pain (Bowel Disease, Inflammatory)  Goal: Acceptable Pain Control  Outcome: Ongoing, Progressing

## 2024-03-19 NOTE — PROGRESS NOTES
formerly [facility] Medicine  Progress Note    Patient Name: Jacoby Jean-Baptiste  MRN: [id number]  Patient Class: IP- Inpatient   Admission Date: 3/14/2024  Length of Stay: 4 days  Attending Physician: Irma Patel MD  Primary Care Provider: Hunter Aguilar III, MD        Subjective:     Principal Problem:Ulcerative colitis        HPI:  49 year old with a past medicine history of ulcertive colitis, DM, obesity, HLD, HTN, C diff whom presents to the emergency department with reported bloody diarrhea over last 4 days  with associated nausea, vomiting, worsening abdominal pain patient has a history of ulcerative colitis who history of C diff in the past he sees Dr. Adama giron he receives regular infusions and is on budesonide on a daily basis as well despite these medications his symptoms have flared he denies any recorded fever no urinary symptoms reported. Patient reports having 4-6 bloody diarrhea stools a day, on daily prednisone with no relief.   In ER, Dilaudid, zofran and IV fluids started, WBC 15, CT abdomen with colitis-proctitis of the distal descending colon, sigmoid colon and rectum, nonspecific but likely of an acute infectious etiology.Admit to hospital medicine , IV solumedrol TID. Waiting stool panel and rule out c diff before starting abx.     Overview/Hospital Course:  Patient was admitted with ulcerative colitis flare.  He was placed on IV Solu-Medrol and GI was consulted.  Stool for occult blood was positive.  C diff was negative.  He was monitored closely during his stay.  His pain was controlled with both oral and IV narcotics p.r.n..  He underwent bowel prep for colonoscopy.    Interval History:  Patient seen and examined. Reports diarrhea about the same.  Some relief with adding welchol and lomotil.  Nausea persists. No vomiting.      Review of Systems   Gastrointestinal:  Positive for abdominal pain, blood in stool and diarrhea.   All other systems reviewed and are  negative.    Objective:     Vital Signs (Most Recent):  Temp: 97.9 °F (36.6 °C) (03/19/24 1023)  Pulse: 65 (03/19/24 1023)  Resp: 18 (03/19/24 1048)  BP: (!) 162/97 (03/19/24 1023)  SpO2: 95 % (03/19/24 1023) Vital Signs (24h Range):  Temp:  [97.6 °F (36.4 °C)-98 °F (36.7 °C)] 97.9 °F (36.6 °C)  Pulse:  [64-81] 65  Resp:  [16-20] 18  SpO2:  [95 %-98 %] 95 %  BP: (141-183)/(57-97) 162/97     Weight: 124.9 kg (275 lb 4.8 oz)  Body mass index is 45.81 kg/m².    Intake/Output Summary (Last 24 hours) at 3/19/2024 1149  Last data filed at 3/19/2024 0835  Gross per 24 hour   Intake 1040 ml   Output --   Net 1040 ml           Physical Exam  Constitutional:       General: He is not in acute distress.     Appearance: He is obese. He is not ill-appearing, toxic-appearing or diaphoretic.   Cardiovascular:      Rate and Rhythm: Normal rate and regular rhythm.      Pulses: Normal pulses.      Heart sounds: Normal heart sounds.   Pulmonary:      Effort: Pulmonary effort is normal. No respiratory distress.      Breath sounds: Normal breath sounds.   Abdominal:      General: Bowel sounds are normal. There is no distension.      Palpations: Abdomen is soft.      Tenderness: There is abdominal tenderness (generalized ttp). There is no guarding.   Skin:     General: Skin is warm and dry.      Coloration: Skin is not pale.      Findings: No erythema.   Neurological:      Mental Status: He is alert and oriented to person, place, and time. Mental status is at baseline.             Significant Labs: All pertinent labs within the past 24 hours have been reviewed.  CBC:   Recent Labs   Lab 03/18/24  0509 03/19/24  0359   WBC 14.88* 14.58*   HGB 12.7* 12.2*   HCT 38.5* 37.3*    366       CMP:   Recent Labs   Lab 03/18/24  0509 03/19/24  0359    136   K 5.1 5.2*    99   CO2 32* 33*   * 221*   BUN 23* 22*   CREATININE 0.9 1.1   CALCIUM 9.3 8.6*   PROT 6.5 6.0   ALBUMIN 3.4* 3.1*   BILITOT 0.3 0.3   ALKPHOS 59 53*   AST  "11 9*   ALT 20 22   ANIONGAP 7* 4*         Significant Imaging: I have reviewed all pertinent imaging results/findings within the past 24 hours.    Assessment/Plan:      * Ulcerative colitis  Multiple bloody diarrhea daily  Trend h/h - stable  IV solumedrol 60 mg TID  IV fluids  Pain control  CT abdomen: colitis-proctitis of the distal descending colon, sigmoid colon and rectum, nonspecific but likely of an acute infectious etiology.  GI consulted  Colonoscopy - severe UC  Diabetic diet   Loperamide for diarrhea not working.  Will add Welchol and Lomotil p.r.n.  Antiemetics prn    Leukocytosis  WBC 15.04  Hold abx coverage for now until stool  panel returns.   Trend.   Patient on home prednisone, start IV solumderol TID  Lactic acid level normal  Improved today - likely 2/2 to steroids    Type 2 diabetes mellitus without complication, without long-term current use of insulin  Patient's FSGs are controlled on current medication regimen.  Last A1c reviewed-   Lab Results   Component Value Date    HGBA1C 5.2 10/27/2023     Most recent fingerstick glucose reviewed- No results for input(s): "POCTGLUCOSE" in the last 24 hours.  Current correctional scale  High  Maintain anti-hyperglycemic dose as follows-   Antihyperglycemics (From admission, onward)      Start     Stop Route Frequency Ordered    03/14/24 2139  insulin aspart U-100 pen 0-5 Units         -- SubQ Before meals & nightly PRN 03/14/24 2040          Hold Oral hypoglycemics while patient is in the hospital.    Morbid obesity  Body mass index is 44.93 kg/m². Morbid obesity complicates all aspects of disease management from diagnostic modalities to treatment. Weight loss encouraged and health benefits explained to patient.           VTE Risk Mitigation (From admission, onward)           Ordered     Reason for No Pharmacological VTE Prophylaxis  Once        Question:  Reasons:  Answer:  Active Bleeding    03/14/24 2040     IP VTE HIGH RISK PATIENT  Once         " 03/14/24 2040     Place sequential compression device  Until discontinued         03/14/24 2040                    Discharge Planning   GERARDO: 3/22/2024     Code Status: Full Code   Is the patient medically ready for discharge?:     Reason for patient still in hospital (select all that apply): Patient trending condition, Treatment, and Consult recommendations  Discharge Plan A: Home   Discharge Delays: (!) Waiting for Provider to Speak to Patient              Rosalind Barrett NP  Department of Hospital Medicine   [facility]

## 2024-03-19 NOTE — PROGRESS NOTES
Patient Name: Jacoby Jean-Baptitse  Patient MRN: [id number]  Patient : 1975    Admit Date: 3/14/2024  Service date: 3/18/2024    Reason for Consult: diarrhea, colitis    PCP: Hunter Aguilar III, MD    S: 6-7 bm per day.  Reports improvement with steroids.  Had exacerbation 3 weeks after last entyvio.  Denies abd pain.     Inpatient Medications:   aluminum-magnesium hydroxide-simethicone  30 mL Oral QID (AC & HS)    atorvastatin  10 mg Oral Daily    busPIRone  7.5 mg Oral TID    colesevelam  1,875 mg Oral BID WM    methylPREDNISolone sodium succinate injection  60 mg Intravenous Q8H    metoprolol succinate  50 mg Oral Daily    pantoprazole  40 mg Oral BID    sertraline  100 mg Oral Daily     acetaminophen, aluminum-magnesium hydroxide-simethicone, dextrose 50% in water (D50W), dextrose 50% in water (D50W), dextrose 50% in water (D50W), dextrose 50% in water (D50W), diphenoxylate-atropine 2.5-0.025 mg, glucagon (human recombinant), glucagon (human recombinant), glucose, glucose, glucose, glucose, HYDROcodone-acetaminophen, HYDROcodone-acetaminophen, HYDROmorphone, insulin aspart U-100, LORazepam, magnesium oxide, magnesium oxide, melatonin, ondansetron, potassium bicarbonate, potassium bicarbonate, potassium bicarbonate, potassium, sodium phosphates, potassium, sodium phosphates, potassium, sodium phosphates, sodium chloride 0.9%, sodium chloride 0.9%, sodium chloride 0.9%, zolpidem    Review of Systems:  A 10 point review of systems was performed and was normal, except as mentioned in the HPI, including constitutional, HEENT, heme, lymph, cardiovascular, respiratory, gastrointestinal, genitourinary, neurologic, endocrine, psychiatric and musculoskeletal.      OBJECTIVE:    Physical Exam:  24 Hour Vital Sign Ranges: Temp:  [97.5 °F (36.4 °C)-98.5 °F (36.9 °C)] 97.7 °F (36.5 °C)  Pulse:  [61-71] 68  Resp:  [16-20] 18  SpO2:  [96 %-98 %] 96 %  BP: (148-183)/(57-93) 157/79  Most recent vitals: BP (!) 157/79 (BP  "Location: Left arm, Patient Position: Lying)   Pulse 68   Temp 97.7 °F (36.5 °C) (Oral)   Resp 18   Ht 5' 5" (1.651 m)   Wt 124.9 kg (275 lb 4.8 oz)   SpO2 96%   BMI 45.81 kg/m²    GEN: well-developed, well-nourished, awake and alert, non-toxic appearing adult  HEENT: PERRL, sclera anicteric, oral mucosa pink and moist without lesion  NECK: trachea midline; Good ROM  CV: regular rate and rhythm, no murmurs or gallops  RESP: clear to auscultation bilaterally, no wheezes, rhonci or rales  ABD: soft, non-tender, non-distended, normal bowel sounds  EXT: no swelling or edema, 2+ pulses distally  SKIN: no rashes or jaundice  PSYCH: normal affect    Labs:   Recent Labs     03/16/24  0937 03/17/24  1045 03/18/24  0509   WBC 13.65* 11.44 14.88*   * 98 99*    373 358     Recent Labs     03/16/24  1404 03/17/24  1045 03/18/24  0509    136 141   K 4.9 4.7 5.1    102 102   CO2 25 26 32*   BUN 19 20 23*   * 167* 157*     No results for input(s): "ALB" in the last 72 hours.    Invalid input(s): "ALKP", "SGOT", "SGPT", "TBIL", "DBIL", "TPRO"  No results for input(s): "PT", "INR", "PTT" in the last 72 hours.    Lab Results   Component Value Date    WBC 14.88 (H) 03/18/2024    HGB 12.7 (L) 03/18/2024    HCT 38.5 (L) 03/18/2024    MCV 99 (H) 03/18/2024     03/18/2024       No results found for: "INR", "PROTIME"  Lab Results   Component Value Date    ALT 20 03/18/2024    AST 11 03/18/2024    ALKPHOS 59 03/18/2024    BILITOT 0.3 03/18/2024       Radiology Review:  CT Abdomen Pelvis With IV Contrast NO Oral Contrast   Final Result            IMPRESSION / RECOMMENDATIONS:  Severe UC  H/o lung infection  H/o Cdiff  -continue solumedrol  -continue stool counts  -MERRITT might be too much for patient given past complicate disease.  S1P might be better option if does not continue to respond to enyvio/steroids      Enoc Kebede  3/18/2024  10:02 PM        "

## 2024-03-19 NOTE — SUBJECTIVE & OBJECTIVE
Interval History:  Patient seen and examined. Reports diarrhea about the same.  Some relief with adding welchol and lomotil.  Nausea persists. No vomiting.      Review of Systems   Gastrointestinal:  Positive for abdominal pain, blood in stool and diarrhea.   All other systems reviewed and are negative.    Objective:     Vital Signs (Most Recent):  Temp: 97.9 °F (36.6 °C) (03/19/24 1023)  Pulse: 65 (03/19/24 1023)  Resp: 18 (03/19/24 1048)  BP: (!) 162/97 (03/19/24 1023)  SpO2: 95 % (03/19/24 1023) Vital Signs (24h Range):  Temp:  [97.6 °F (36.4 °C)-98 °F (36.7 °C)] 97.9 °F (36.6 °C)  Pulse:  [64-81] 65  Resp:  [16-20] 18  SpO2:  [95 %-98 %] 95 %  BP: (141-183)/(57-97) 162/97     Weight: 124.9 kg (275 lb 4.8 oz)  Body mass index is 45.81 kg/m².    Intake/Output Summary (Last 24 hours) at 3/19/2024 1149  Last data filed at 3/19/2024 0835  Gross per 24 hour   Intake 1040 ml   Output --   Net 1040 ml           Physical Exam  Constitutional:       General: He is not in acute distress.     Appearance: He is obese. He is not ill-appearing, toxic-appearing or diaphoretic.   Cardiovascular:      Rate and Rhythm: Normal rate and regular rhythm.      Pulses: Normal pulses.      Heart sounds: Normal heart sounds.   Pulmonary:      Effort: Pulmonary effort is normal. No respiratory distress.      Breath sounds: Normal breath sounds.   Abdominal:      General: Bowel sounds are normal. There is no distension.      Palpations: Abdomen is soft.      Tenderness: There is abdominal tenderness (generalized ttp). There is no guarding.   Skin:     General: Skin is warm and dry.      Coloration: Skin is not pale.      Findings: No erythema.   Neurological:      Mental Status: He is alert and oriented to person, place, and time. Mental status is at baseline.             Significant Labs: All pertinent labs within the past 24 hours have been reviewed.  CBC:   Recent Labs   Lab 03/18/24  0509 03/19/24  0359   WBC 14.88* 14.58*   HGB 12.7*  12.2*   HCT 38.5* 37.3*    366       CMP:   Recent Labs   Lab 03/18/24  0509 03/19/24  0359    136   K 5.1 5.2*    99   CO2 32* 33*   * 221*   BUN 23* 22*   CREATININE 0.9 1.1   CALCIUM 9.3 8.6*   PROT 6.5 6.0   ALBUMIN 3.4* 3.1*   BILITOT 0.3 0.3   ALKPHOS 59 53*   AST 11 9*   ALT 20 22   ANIONGAP 7* 4*         Significant Imaging: I have reviewed all pertinent imaging results/findings within the past 24 hours.

## 2024-03-19 NOTE — PROGRESS NOTES
Patient Name: Jacoby Jean-Baptiste  Patient MRN: [id number]  Patient : 1975    Admit Date: 3/14/2024  Service date: 3/19/2024    Reason for Consult: ulcerative colitis.     PCP: Hunter Aguilar III, MD    S: Still w/ 4- 5 loose BM daily. Bleeding improved. .     Inpatient Medications:   aluminum-magnesium hydroxide-simethicone  30 mL Oral QID (AC & HS)    atorvastatin  10 mg Oral Daily    busPIRone  7.5 mg Oral TID    colesevelam  1,875 mg Oral BID WM    methylPREDNISolone sodium succinate injection  30 mg Intravenous Q12H    metoprolol succinate  50 mg Oral Daily    pantoprazole  40 mg Oral BID    sertraline  100 mg Oral Daily     acetaminophen, aluminum-magnesium hydroxide-simethicone, dextrose 50% in water (D50W), dextrose 50% in water (D50W), dextrose 50% in water (D50W), dextrose 50% in water (D50W), diphenoxylate-atropine 2.5-0.025 mg, glucagon (human recombinant), glucagon (human recombinant), glucose, glucose, glucose, glucose, HYDROcodone-acetaminophen, HYDROcodone-acetaminophen, HYDROmorphone, insulin aspart U-100, LORazepam, magnesium oxide, magnesium oxide, melatonin, ondansetron, potassium bicarbonate, potassium bicarbonate, potassium bicarbonate, potassium, sodium phosphates, potassium, sodium phosphates, potassium, sodium phosphates, sodium chloride 0.9%, sodium chloride 0.9%, sodium chloride 0.9%, zolpidem    Review of Systems:  A 10 point review of systems was performed and was normal, except as mentioned in the HPI, including constitutional, HEENT, heme, lymph, cardiovascular, respiratory, gastrointestinal, genitourinary, neurologic, endocrine, psychiatric and musculoskeletal.      OBJECTIVE:    Physical Exam:  24 Hour Vital Sign Ranges: Temp:  [97.7 °F (36.5 °C)-98.6 °F (37 °C)] 98.6 °F (37 °C)  Pulse:  [65-81] 70  Resp:  [16-18] 17  SpO2:  [95 %-97 %] 95 %  BP: (141-163)/(70-97) 152/92  Most recent vitals: BP (!) 152/92 (BP Location: Left arm, Patient Position: Lying)   Pulse 70   Temp 98.6  "°F (37 °C) (Oral)   Resp 17   Ht 5' 5" (1.651 m)   Wt 124.9 kg (275 lb 4.8 oz)   SpO2 95%   BMI 45.81 kg/m²    GEN: well-developed, well-nourished, awake and alert, non-toxic appearing adult  HEENT: PERRL, sclera anicteric, oral mucosa pink and moist without lesion  NECK: trachea midline; Good ROM  CV: regular rate and rhythm, no murmurs or gallops  RESP: clear to auscultation bilaterally, no wheezes, rhonci or rales  ABD: soft, min-tender, non-distended, normal bowel sounds  EXT: no swelling or edema, 2+ pulses distally  SKIN: no rashes or jaundice  PSYCH: normal affect    Labs:   Recent Labs     03/17/24  1045 03/18/24  0509 03/19/24  0359   WBC 11.44 14.88* 14.58*   MCV 98 99* 97    358 366     Recent Labs     03/17/24  1045 03/18/24  0509 03/19/24  0359    141 136   K 4.7 5.1 5.2*    102 99   CO2 26 32* 33*   BUN 20 23* 22*   * 157* 221*     No results for input(s): "ALB" in the last 72 hours.    Invalid input(s): "ALKP", "SGOT", "SGPT", "TBIL", "DBIL", "TPRO"  No results for input(s): "PT", "INR", "PTT" in the last 72 hours.      Radiology Review:  CT Abdomen Pelvis With IV Contrast NO Oral Contrast   Final Result            IMPRESSION / RECOMMENDATIONS:  49 y/o M w/ho HLD, DM on ozempic, HTN, LOLIS, umbilical hernia repair, tobacco use, C. diff s/p Vanco / dificid / FMT / rebyota presents for evaluation of bloody diarrhea. Did well initially w/ Remicade once C .diff cleared but developed fungal infection in 11/'23. Swiched to Entyvio in early 1/'24 (3 infusions at this point) but symptoms returned and calpro when back up >3000. Fortunately C. Diff negative on admission but colon with severe colitis w/ pseudopolyps and inflammatory narrowing.     -Continue steroids for now and on d/c w/ plans to wean quickly and transition to budesonide given h/o fungal infectious  -WIll reach out to IBD specialist and discuss continuing Entyvio vs Omvoh vs other.   -Avoid abx as high risk for " recurrent C. Diff.     Shan TINOCO Dauterive III  3/19/2024  5:05 PM

## 2024-03-20 LAB
ALBUMIN SERPL BCP-MCNC: 3.1 G/DL (ref 3.5–5.2)
ALP SERPL-CCNC: 47 U/L (ref 55–135)
ALT SERPL W/O P-5'-P-CCNC: 24 U/L (ref 10–44)
ANION GAP SERPL CALC-SCNC: 3 MMOL/L (ref 8–16)
AST SERPL-CCNC: 9 U/L (ref 10–40)
BASOPHILS # BLD AUTO: 0.05 K/UL (ref 0–0.2)
BASOPHILS NFR BLD: 0.4 % (ref 0–1.9)
BILIRUB SERPL-MCNC: 0.4 MG/DL (ref 0.1–1)
BUN SERPL-MCNC: 17 MG/DL (ref 6–20)
CALCIUM SERPL-MCNC: 8.4 MG/DL (ref 8.7–10.5)
CALPROTECTIN STL-MCNT: >8000 UG/G (ref 0–120)
CHLORIDE SERPL-SCNC: 99 MMOL/L (ref 95–110)
CO2 SERPL-SCNC: 33 MMOL/L (ref 23–29)
CREAT SERPL-MCNC: 0.9 MG/DL (ref 0.5–1.4)
DIFFERENTIAL METHOD BLD: ABNORMAL
EOSINOPHIL # BLD AUTO: 0 K/UL (ref 0–0.5)
EOSINOPHIL NFR BLD: 0.2 % (ref 0–8)
ERYTHROCYTE [DISTWIDTH] IN BLOOD BY AUTOMATED COUNT: 13.6 % (ref 11.5–14.5)
EST. GFR  (NO RACE VARIABLE): >60 ML/MIN/1.73 M^2
GLUCOSE SERPL-MCNC: 107 MG/DL (ref 70–110)
GLUCOSE SERPL-MCNC: 125 MG/DL (ref 70–110)
GLUCOSE SERPL-MCNC: 128 MG/DL (ref 70–110)
GLUCOSE SERPL-MCNC: 129 MG/DL (ref 70–110)
GLUCOSE SERPL-MCNC: 165 MG/DL (ref 70–110)
HCT VFR BLD AUTO: 36.3 % (ref 40–54)
HGB BLD-MCNC: 12.2 G/DL (ref 14–18)
IMM GRANULOCYTES # BLD AUTO: 0.41 K/UL (ref 0–0.04)
IMM GRANULOCYTES NFR BLD AUTO: 3.5 % (ref 0–0.5)
LYMPHOCYTES # BLD AUTO: 1.3 K/UL (ref 1–4.8)
LYMPHOCYTES NFR BLD: 11.4 % (ref 18–48)
MAGNESIUM SERPL-MCNC: 1.8 MG/DL (ref 1.6–2.6)
MCH RBC QN AUTO: 32.4 PG (ref 27–31)
MCHC RBC AUTO-ENTMCNC: 33.6 G/DL (ref 32–36)
MCV RBC AUTO: 97 FL (ref 82–98)
MONOCYTES # BLD AUTO: 0.9 K/UL (ref 0.3–1)
MONOCYTES NFR BLD: 7.8 % (ref 4–15)
NEUTROPHILS # BLD AUTO: 9 K/UL (ref 1.8–7.7)
NEUTROPHILS NFR BLD: 76.7 % (ref 38–73)
NRBC BLD-RTO: 0 /100 WBC
PHOSPHATE SERPL-MCNC: 2.4 MG/DL (ref 2.7–4.5)
PLATELET # BLD AUTO: 292 K/UL (ref 150–450)
PMV BLD AUTO: 9.6 FL (ref 9.2–12.9)
POTASSIUM SERPL-SCNC: 4.5 MMOL/L (ref 3.5–5.1)
PROT SERPL-MCNC: 5.6 G/DL (ref 6–8.4)
RBC # BLD AUTO: 3.76 M/UL (ref 4.6–6.2)
SODIUM SERPL-SCNC: 135 MMOL/L (ref 136–145)
WBC # BLD AUTO: 11.71 K/UL (ref 3.9–12.7)

## 2024-03-20 PROCEDURE — 25000003 PHARM REV CODE 250: Performed by: INTERNAL MEDICINE

## 2024-03-20 PROCEDURE — 85025 COMPLETE CBC W/AUTO DIFF WBC: CPT | Performed by: INTERNAL MEDICINE

## 2024-03-20 PROCEDURE — 25000003 PHARM REV CODE 250: Performed by: NURSE PRACTITIONER

## 2024-03-20 PROCEDURE — 84100 ASSAY OF PHOSPHORUS: CPT | Performed by: NURSE PRACTITIONER

## 2024-03-20 PROCEDURE — 63600175 PHARM REV CODE 636 W HCPCS: Performed by: INTERNAL MEDICINE

## 2024-03-20 PROCEDURE — 36415 COLL VENOUS BLD VENIPUNCTURE: CPT | Performed by: INTERNAL MEDICINE

## 2024-03-20 PROCEDURE — 80053 COMPREHEN METABOLIC PANEL: CPT | Performed by: INTERNAL MEDICINE

## 2024-03-20 PROCEDURE — 83735 ASSAY OF MAGNESIUM: CPT | Performed by: INTERNAL MEDICINE

## 2024-03-20 PROCEDURE — 25000003 PHARM REV CODE 250: Performed by: HOSPITALIST

## 2024-03-20 PROCEDURE — 21400001 HC TELEMETRY ROOM

## 2024-03-20 PROCEDURE — 63600175 PHARM REV CODE 636 W HCPCS: Performed by: NURSE PRACTITIONER

## 2024-03-20 RX ORDER — PREDNISONE 20 MG/1
20 TABLET ORAL DAILY
Status: DISCONTINUED | OUTPATIENT
Start: 2024-03-21 | End: 2024-03-20

## 2024-03-20 RX ORDER — PREDNISONE 20 MG/1
20 TABLET ORAL DAILY
Status: DISCONTINUED | OUTPATIENT
Start: 2024-03-20 | End: 2024-03-20

## 2024-03-20 RX ORDER — PREDNISONE 20 MG/1
40 TABLET ORAL DAILY
Status: DISCONTINUED | OUTPATIENT
Start: 2024-03-21 | End: 2024-03-21 | Stop reason: HOSPADM

## 2024-03-20 RX ADMIN — ALUMINUM HYDROXIDE, MAGNESIUM HYDROXIDE, AND SIMETHICONE 30 ML: 1200; 120; 1200 SUSPENSION ORAL at 05:03

## 2024-03-20 RX ADMIN — METHYLPREDNISOLONE SODIUM SUCCINATE 30 MG: 40 INJECTION, POWDER, FOR SOLUTION INTRAMUSCULAR; INTRAVENOUS at 08:03

## 2024-03-20 RX ADMIN — ONDANSETRON 4 MG: 2 INJECTION INTRAMUSCULAR; INTRAVENOUS at 12:03

## 2024-03-20 RX ADMIN — PANTOPRAZOLE SODIUM 40 MG: 40 TABLET, DELAYED RELEASE ORAL at 05:03

## 2024-03-20 RX ADMIN — HYDROMORPHONE HYDROCHLORIDE 1 MG: 1 INJECTION, SOLUTION INTRAMUSCULAR; INTRAVENOUS; SUBCUTANEOUS at 08:03

## 2024-03-20 RX ADMIN — HYDROCODONE BITARTRATE AND ACETAMINOPHEN 1 TABLET: 10; 325 TABLET ORAL at 05:03

## 2024-03-20 RX ADMIN — ATORVASTATIN CALCIUM 10 MG: 10 TABLET, FILM COATED ORAL at 08:03

## 2024-03-20 RX ADMIN — SERTRALINE HYDROCHLORIDE 100 MG: 50 TABLET ORAL at 08:03

## 2024-03-20 RX ADMIN — HYDROCODONE BITARTRATE AND ACETAMINOPHEN 1 TABLET: 10; 325 TABLET ORAL at 12:03

## 2024-03-20 RX ADMIN — Medication 800 MG: at 08:03

## 2024-03-20 RX ADMIN — COLESEVELAM 1875 MG: 180 TABLET ORAL at 08:03

## 2024-03-20 RX ADMIN — ZOLPIDEM TARTRATE 10 MG: 5 TABLET, COATED ORAL at 10:03

## 2024-03-20 RX ADMIN — ACETAMINOPHEN 650 MG: 325 TABLET ORAL at 08:03

## 2024-03-20 RX ADMIN — ALUMINUM HYDROXIDE, MAGNESIUM HYDROXIDE, AND SIMETHICONE 30 ML: 1200; 120; 1200 SUSPENSION ORAL at 08:03

## 2024-03-20 RX ADMIN — COLESEVELAM 1875 MG: 180 TABLET ORAL at 05:03

## 2024-03-20 RX ADMIN — LORAZEPAM 0.5 MG: 0.5 TABLET ORAL at 10:03

## 2024-03-20 RX ADMIN — BUSPIRONE HYDROCHLORIDE 7.5 MG: 5 TABLET ORAL at 08:03

## 2024-03-20 RX ADMIN — LORAZEPAM 0.5 MG: 0.5 TABLET ORAL at 08:03

## 2024-03-20 RX ADMIN — DIPHENOXYLATE HYDROCHLORIDE AND ATROPINE SULFATE 1 TABLET: 2.5; .025 TABLET ORAL at 08:03

## 2024-03-20 RX ADMIN — BUSPIRONE HYDROCHLORIDE 7.5 MG: 5 TABLET ORAL at 03:03

## 2024-03-20 RX ADMIN — METOPROLOL SUCCINATE 50 MG: 50 TABLET, EXTENDED RELEASE ORAL at 08:03

## 2024-03-20 NOTE — ASSESSMENT & PLAN NOTE
Body mass index is 45.81 kg/m². Morbid obesity complicates all aspects of disease management from diagnostic modalities to treatment. Weight loss encouraged and health benefits explained to patient.

## 2024-03-20 NOTE — ASSESSMENT & PLAN NOTE
WBC 15.04  Hold abx coverage for now until stool  panel returns.   Trend.   Patient on home prednisone, start IV solumderol TID  Lactic acid level normal  resolved today - likely 2/2 to steroids

## 2024-03-20 NOTE — ASSESSMENT & PLAN NOTE
"Patient's FSGs are controlled on current medication regimen.  Last A1c reviewed-   Lab Results   Component Value Date    HGBA1C 5.7 03/16/2024     Most recent fingerstick glucose reviewed- No results for input(s): "POCTGLUCOSE" in the last 24 hours.  Current correctional scale  High  Maintain anti-hyperglycemic dose as follows-   Antihyperglycemics (From admission, onward)    Start     Stop Route Frequency Ordered    03/14/24 2139  insulin aspart U-100 pen 0-5 Units         -- SubQ Before meals & nightly PRN 03/14/24 2040        Hold Oral hypoglycemics while patient is in the hospital.  "

## 2024-03-20 NOTE — PLAN OF CARE
Problem: Infection (Bowel Disease, Inflammatory)  Goal: Absence of Infection Signs and Symptoms  Outcome: Ongoing, Progressing     Problem: Nutrition Impaired (Bowel Disease, Inflammatory)  Goal: Optimal Nutrition  Outcome: Ongoing, Progressing     Problem: Pain (Bowel Disease, Inflammatory)  Goal: Acceptable Pain Control  Outcome: Ongoing, Progressing

## 2024-03-20 NOTE — SUBJECTIVE & OBJECTIVE
Interval History:  Patient seen and examined.  Overnight notes reviewed.  Patient reports approximately 4 BMs today.  Reports generalized abdominal pain.  GI following.  Transitioning to oral steroids.    Review of Systems   Respiratory:  Negative for shortness of breath.    Cardiovascular:  Negative for chest pain.   Gastrointestinal:  Positive for abdominal pain and diarrhea.     Objective:     Vital Signs (Most Recent):  Temp: 98 °F (36.7 °C) (03/20/24 1100)  Pulse: 69 (03/20/24 1100)  Resp: 18 (03/20/24 1248)  BP: (!) 154/77 (03/20/24 1100)  SpO2: 97 % (03/20/24 1100) Vital Signs (24h Range):  Temp:  [97.9 °F (36.6 °C)-98.6 °F (37 °C)] 98 °F (36.7 °C)  Pulse:  [59-81] 69  Resp:  [10-18] 18  SpO2:  [94 %-97 %] 97 %  BP: (141-186)/(75-92) 154/77     Weight: 124.9 kg (275 lb 4.8 oz)  Body mass index is 45.81 kg/m².    Intake/Output Summary (Last 24 hours) at 3/20/2024 1345  Last data filed at 3/20/2024 1231  Gross per 24 hour   Intake 840 ml   Output 100 ml   Net 740 ml         Physical Exam  Vitals and nursing note reviewed.   Constitutional:       General: He is not in acute distress.     Appearance: Normal appearance. He is obese.   HENT:      Head: Normocephalic and atraumatic.   Eyes:      Extraocular Movements: Extraocular movements intact.      Pupils: Pupils are equal, round, and reactive to light.   Cardiovascular:      Rate and Rhythm: Normal rate and regular rhythm.      Pulses: Normal pulses.      Heart sounds: Normal heart sounds.   Pulmonary:      Effort: Pulmonary effort is normal.      Breath sounds: Normal breath sounds.   Abdominal:      General: Abdomen is flat. Bowel sounds are normal.      Palpations: Abdomen is soft.      Tenderness: There is generalized abdominal tenderness. There is no guarding or rebound.   Skin:     General: Skin is warm.      Capillary Refill: Capillary refill takes less than 2 seconds.   Neurological:      General: No focal deficit present.      Mental Status: He is  alert and oriented to person, place, and time. Mental status is at baseline.   Psychiatric:         Mood and Affect: Mood normal.         Behavior: Behavior normal.             Significant Labs: All pertinent labs within the past 24 hours have been reviewed.  CBC:   Recent Labs   Lab 03/19/24 0359 03/20/24  0545   WBC 14.58* 11.71   HGB 12.2* 12.2*   HCT 37.3* 36.3*    292     CMP:   Recent Labs   Lab 03/19/24  0359 03/20/24  0545    135*   K 5.2* 4.5   CL 99 99   CO2 33* 33*   * 128*   BUN 22* 17   CREATININE 1.1 0.9   CALCIUM 8.6* 8.4*   PROT 6.0 5.6*   ALBUMIN 3.1* 3.1*   BILITOT 0.3 0.4   ALKPHOS 53* 47*   AST 9* 9*   ALT 22 24   ANIONGAP 4* 3*       Significant Imaging: I have reviewed all pertinent imaging results/findings within the past 24 hours.

## 2024-03-20 NOTE — PROGRESS NOTES
Patient Name: Jacoby Jean-Baptiste  Patient MRN: [id number]  Patient : 1975    Admit Date: 3/14/2024  Service date: 3/20/2024    Reason for Consult: diarrhea, colitis    PCP: Hunter Aguilar III, MD    S: Patient reports some improvement with lomotil added today. Abdominal pain controlled with lortab.    5+ bm charted today.  Inpatient Medications:   aluminum-magnesium hydroxide-simethicone  30 mL Oral QID (AC & HS)    atorvastatin  10 mg Oral Daily    busPIRone  7.5 mg Oral TID    colesevelam  1,875 mg Oral BID WM    metoprolol succinate  50 mg Oral Daily    pantoprazole  40 mg Oral BID    [START ON 3/21/2024] predniSONE  20 mg Oral Daily    sertraline  100 mg Oral Daily     acetaminophen, aluminum-magnesium hydroxide-simethicone, dextrose 50% in water (D50W), dextrose 50% in water (D50W), dextrose 50% in water (D50W), dextrose 50% in water (D50W), diphenoxylate-atropine 2.5-0.025 mg, glucagon (human recombinant), glucagon (human recombinant), glucose, glucose, glucose, glucose, HYDROcodone-acetaminophen, HYDROcodone-acetaminophen, HYDROmorphone, insulin aspart U-100, LORazepam, magnesium oxide, magnesium oxide, melatonin, ondansetron, potassium bicarbonate, potassium bicarbonate, potassium bicarbonate, potassium, sodium phosphates, potassium, sodium phosphates, potassium, sodium phosphates, sodium chloride 0.9%, sodium chloride 0.9%, sodium chloride 0.9%, zolpidem    Review of Systems:  A 10 point review of systems was performed and was normal, except as mentioned in the HPI, including constitutional, HEENT, heme, lymph, cardiovascular, respiratory, gastrointestinal, genitourinary, neurologic, endocrine, psychiatric and musculoskeletal.      OBJECTIVE:    Physical Exam:  24 Hour Vital Sign Ranges: Temp:  [97.9 °F (36.6 °C)-98.1 °F (36.7 °C)] 98 °F (36.7 °C)  Pulse:  [59-81] 69  Resp:  [10-18] 18  SpO2:  [94 %-97 %] 97 %  BP: (141-186)/(75-88) 154/77  Most recent vitals: BP (!) 154/77 (BP Location: Left arm,  "Patient Position: Lying)   Pulse 69   Temp 98 °F (36.7 °C) (Oral)   Resp 18   Ht 5' 5" (1.651 m)   Wt 124.9 kg (275 lb 4.8 oz)   SpO2 97%   BMI 45.81 kg/m²    GEN: well-developed, well-nourished, awake and alert, non-toxic appearing adult  HEENT: PERRL, sclera anicteric, oral mucosa pink and moist without lesion  NECK: trachea midline; Good ROM  CV: regular rate and rhythm, no murmurs or gallops  RESP: clear to auscultation bilaterally, no wheezes, rhonci or rales  ABD: soft, non-tender, non-distended, normal bowel sounds  EXT: no swelling or edema, 2+ pulses distally  SKIN: no rashes or jaundice  PSYCH: normal affect    Labs:   Recent Labs     03/18/24  0509 03/19/24  0359 03/20/24  0545   WBC 14.88* 14.58* 11.71   MCV 99* 97 97    366 292       Recent Labs     03/18/24  0509 03/19/24  0359 03/20/24  0545    136 135*   K 5.1 5.2* 4.5    99 99   CO2 32* 33* 33*   BUN 23* 22* 17   * 221* 128*       No results for input(s): "ALB" in the last 72 hours.    Invalid input(s): "ALKP", "SGOT", "SGPT", "TBIL", "DBIL", "TPRO"  No results for input(s): "PT", "INR", "PTT" in the last 72 hours.    Lab Results   Component Value Date    WBC 11.71 03/20/2024    HGB 12.2 (L) 03/20/2024    HCT 36.3 (L) 03/20/2024    MCV 97 03/20/2024     03/20/2024       No results found for: "INR", "PROTIME"  Lab Results   Component Value Date    ALT 24 03/20/2024    AST 9 (L) 03/20/2024    ALKPHOS 47 (L) 03/20/2024    BILITOT 0.4 03/20/2024       Radiology Review:  CT Abdomen Pelvis With IV Contrast NO Oral Contrast   Final Result            IMPRESSION / RECOMMENDATIONS:  Severe UC  H/o lung infection  H/o Cdiff  Prednisone 40 mg then taper by 10 mg every 5 days.  Outpatient entyvio as soon as discharged   Calprotectin pending    Odalis Mccabe  3/20/2024  10:02 PM        "

## 2024-03-20 NOTE — ASSESSMENT & PLAN NOTE
Multiple bloody diarrhea daily  Trend h/h - stable  IV solumedrol 60 mg TID transitioned to oral prednisone per GI  IV fluids  Pain control  CT abdomen: colitis-proctitis of the distal descending colon, sigmoid colon and rectum, nonspecific but likely of an acute infectious etiology.  GI consulted  Colonoscopy - severe UC  Diabetic diet   Loperamide for diarrhea not working.  Will add Welchol and Lomotil p.r.n.  Antiemetics prn

## 2024-03-20 NOTE — PROGRESS NOTES
[facility] Medicine  Progress Note    Patient Name: Jacoby Jean-Baptiste  MRN: [id number]  Patient Class: IP- Inpatient   Admission Date: 3/14/2024  Length of Stay: 5 days  Attending Physician: Irma Patel MD  Primary Care Provider: Hunter Aguilar III, MD        Subjective:     Principal Problem:Ulcerative colitis        HPI:  49 year old with a past medicine history of ulcertive colitis, DM, obesity, HLD, HTN, C diff whom presents to the emergency department with reported bloody diarrhea over last 4 days  with associated nausea, vomiting, worsening abdominal pain patient has a history of ulcerative colitis who history of C diff in the past he sees Dr. Adama giron he receives regular infusions and is on budesonide on a daily basis as well despite these medications his symptoms have flared he denies any recorded fever no urinary symptoms reported. Patient reports having 4-6 bloody diarrhea stools a day, on daily prednisone with no relief.   In ER, Dilaudid, zofran and IV fluids started, WBC 15, CT abdomen with colitis-proctitis of the distal descending colon, sigmoid colon and rectum, nonspecific but likely of an acute infectious etiology.Admit to hospital medicine , IV solumedrol TID. Waiting stool panel and rule out c diff before starting abx.     Overview/Hospital Course:  Patient was admitted with ulcerative colitis flare.  He was placed on IV Solu-Medrol and GI was consulted.  Stool for occult blood was positive.  C diff was negative.  He was monitored closely during his stay.  His pain was controlled with both oral and IV narcotics p.r.n..  He underwent bowel prep for colonoscopy.  Colonoscopy revealed severe ulcerative colitis.  His symptoms persisted and he continued to have diarrheal stools.  Welchol and Lomotil were added to his regimen.  His hemoglobin hematocrit remained stable.    Interval History:  Patient seen and examined.  Overnight notes reviewed.  Patient reports  approximately 4 BMs today.  Reports generalized abdominal pain.  GI following.  Transitioning to oral steroids.    Review of Systems   Respiratory:  Negative for shortness of breath.    Cardiovascular:  Negative for chest pain.   Gastrointestinal:  Positive for abdominal pain and diarrhea.     Objective:     Vital Signs (Most Recent):  Temp: 98 °F (36.7 °C) (03/20/24 1100)  Pulse: 69 (03/20/24 1100)  Resp: 18 (03/20/24 1248)  BP: (!) 154/77 (03/20/24 1100)  SpO2: 97 % (03/20/24 1100) Vital Signs (24h Range):  Temp:  [97.9 °F (36.6 °C)-98.6 °F (37 °C)] 98 °F (36.7 °C)  Pulse:  [59-81] 69  Resp:  [10-18] 18  SpO2:  [94 %-97 %] 97 %  BP: (141-186)/(75-92) 154/77     Weight: 124.9 kg (275 lb 4.8 oz)  Body mass index is 45.81 kg/m².    Intake/Output Summary (Last 24 hours) at 3/20/2024 1345  Last data filed at 3/20/2024 1231  Gross per 24 hour   Intake 840 ml   Output 100 ml   Net 740 ml         Physical Exam  Vitals and nursing note reviewed.   Constitutional:       General: He is not in acute distress.     Appearance: Normal appearance. He is obese.   HENT:      Head: Normocephalic and atraumatic.   Eyes:      Extraocular Movements: Extraocular movements intact.      Pupils: Pupils are equal, round, and reactive to light.   Cardiovascular:      Rate and Rhythm: Normal rate and regular rhythm.      Pulses: Normal pulses.      Heart sounds: Normal heart sounds.   Pulmonary:      Effort: Pulmonary effort is normal.      Breath sounds: Normal breath sounds.   Abdominal:      General: Abdomen is flat. Bowel sounds are normal.      Palpations: Abdomen is soft.      Tenderness: There is generalized abdominal tenderness. There is no guarding or rebound.   Skin:     General: Skin is warm.      Capillary Refill: Capillary refill takes less than 2 seconds.   Neurological:      General: No focal deficit present.      Mental Status: He is alert and oriented to person, place, and time. Mental status is at baseline.   Psychiatric:     "     Mood and Affect: Mood normal.         Behavior: Behavior normal.             Significant Labs: All pertinent labs within the past 24 hours have been reviewed.  CBC:   Recent Labs   Lab 03/19/24  0359 03/20/24  0545   WBC 14.58* 11.71   HGB 12.2* 12.2*   HCT 37.3* 36.3*    292     CMP:   Recent Labs   Lab 03/19/24  0359 03/20/24  0545    135*   K 5.2* 4.5   CL 99 99   CO2 33* 33*   * 128*   BUN 22* 17   CREATININE 1.1 0.9   CALCIUM 8.6* 8.4*   PROT 6.0 5.6*   ALBUMIN 3.1* 3.1*   BILITOT 0.3 0.4   ALKPHOS 53* 47*   AST 9* 9*   ALT 22 24   ANIONGAP 4* 3*       Significant Imaging: I have reviewed all pertinent imaging results/findings within the past 24 hours.    Assessment/Plan:      * Ulcerative colitis  Multiple bloody diarrhea daily  Trend h/h - stable  IV solumedrol 60 mg TID transitioned to oral prednisone per GI  IV fluids  Pain control  CT abdomen: colitis-proctitis of the distal descending colon, sigmoid colon and rectum, nonspecific but likely of an acute infectious etiology.  GI consulted  Colonoscopy - severe UC  Diabetic diet   Loperamide for diarrhea not working.  Will add Welchol and Lomotil p.r.n.  Antiemetics prn    Leukocytosis  WBC 15.04  Hold abx coverage for now until stool  panel returns.   Trend.   Patient on home prednisone, start IV solumderol TID  Lactic acid level normal  resolved today - likely 2/2 to steroids    Type 2 diabetes mellitus without complication, without long-term current use of insulin  Patient's FSGs are controlled on current medication regimen.  Last A1c reviewed-   Lab Results   Component Value Date    HGBA1C 5.7 03/16/2024     Most recent fingerstick glucose reviewed- No results for input(s): "POCTGLUCOSE" in the last 24 hours.  Current correctional scale  High  Maintain anti-hyperglycemic dose as follows-   Antihyperglycemics (From admission, onward)      Start     Stop Route Frequency Ordered    03/14/24 3930  insulin aspart U-100 pen 0-5 Units    "      -- SubQ Before meals & nightly PRN 03/14/24 2040          Hold Oral hypoglycemics while patient is in the hospital.    Morbid obesity  Body mass index is 45.81 kg/m². Morbid obesity complicates all aspects of disease management from diagnostic modalities to treatment. Weight loss encouraged and health benefits explained to patient.           VTE Risk Mitigation (From admission, onward)           Ordered     Reason for No Pharmacological VTE Prophylaxis  Once        Question:  Reasons:  Answer:  Active Bleeding    03/14/24 2040     IP VTE HIGH RISK PATIENT  Once         03/14/24 2040     Place sequential compression device  Until discontinued         03/14/24 2040                    Discharge Planning   GERARDO: 3/21/2024     Code Status: Full Code   Is the patient medically ready for discharge?:     Reason for patient still in hospital (select all that apply): Patient trending condition, Laboratory test, Treatment, Consult recommendations, and Pending disposition  Discharge Plan A: Home   Discharge Delays: (!) Waiting for Provider to Speak to Patient              Gely Mccoy PA-C  Department of Hospital Medicine   [facility]

## 2024-03-21 VITALS
HEIGHT: 65 IN | WEIGHT: 275.31 LBS | SYSTOLIC BLOOD PRESSURE: 159 MMHG | DIASTOLIC BLOOD PRESSURE: 77 MMHG | TEMPERATURE: 98 F | RESPIRATION RATE: 18 BRPM | BODY MASS INDEX: 45.87 KG/M2 | HEART RATE: 94 BPM | OXYGEN SATURATION: 96 %

## 2024-03-21 LAB
ALBUMIN SERPL BCP-MCNC: 2.9 G/DL (ref 3.5–5.2)
ALP SERPL-CCNC: 45 U/L (ref 55–135)
ALT SERPL W/O P-5'-P-CCNC: 20 U/L (ref 10–44)
ANION GAP SERPL CALC-SCNC: 5 MMOL/L (ref 8–16)
AST SERPL-CCNC: 8 U/L (ref 10–40)
BASOPHILS # BLD AUTO: 0.04 K/UL (ref 0–0.2)
BASOPHILS NFR BLD: 0.4 % (ref 0–1.9)
BILIRUB SERPL-MCNC: 0.4 MG/DL (ref 0.1–1)
BUN SERPL-MCNC: 16 MG/DL (ref 6–20)
CALCIUM SERPL-MCNC: 8 MG/DL (ref 8.7–10.5)
CHLORIDE SERPL-SCNC: 100 MMOL/L (ref 95–110)
CO2 SERPL-SCNC: 31 MMOL/L (ref 23–29)
CREAT SERPL-MCNC: 0.9 MG/DL (ref 0.5–1.4)
DIFFERENTIAL METHOD BLD: ABNORMAL
EOSINOPHIL # BLD AUTO: 0.2 K/UL (ref 0–0.5)
EOSINOPHIL NFR BLD: 1.3 % (ref 0–8)
ERYTHROCYTE [DISTWIDTH] IN BLOOD BY AUTOMATED COUNT: 13.8 % (ref 11.5–14.5)
EST. GFR  (NO RACE VARIABLE): >60 ML/MIN/1.73 M^2
GLUCOSE SERPL-MCNC: 105 MG/DL (ref 70–110)
GLUCOSE SERPL-MCNC: 151 MG/DL (ref 70–110)
GLUCOSE SERPL-MCNC: 95 MG/DL (ref 70–110)
HCT VFR BLD AUTO: 36.2 % (ref 40–54)
HGB BLD-MCNC: 11.9 G/DL (ref 14–18)
IMM GRANULOCYTES # BLD AUTO: 0.22 K/UL (ref 0–0.04)
IMM GRANULOCYTES NFR BLD AUTO: 2 % (ref 0–0.5)
LYMPHOCYTES # BLD AUTO: 1.6 K/UL (ref 1–4.8)
LYMPHOCYTES NFR BLD: 14.7 % (ref 18–48)
MAGNESIUM SERPL-MCNC: 1.6 MG/DL (ref 1.6–2.6)
MCH RBC QN AUTO: 31.7 PG (ref 27–31)
MCHC RBC AUTO-ENTMCNC: 32.9 G/DL (ref 32–36)
MCV RBC AUTO: 97 FL (ref 82–98)
MONOCYTES # BLD AUTO: 0.9 K/UL (ref 0.3–1)
MONOCYTES NFR BLD: 8.1 % (ref 4–15)
NEUTROPHILS # BLD AUTO: 8.2 K/UL (ref 1.8–7.7)
NEUTROPHILS NFR BLD: 73.5 % (ref 38–73)
NRBC BLD-RTO: 0 /100 WBC
PHOSPHATE SERPL-MCNC: 2.3 MG/DL (ref 2.7–4.5)
PLATELET # BLD AUTO: 261 K/UL (ref 150–450)
PMV BLD AUTO: 9.7 FL (ref 9.2–12.9)
POTASSIUM SERPL-SCNC: 3.8 MMOL/L (ref 3.5–5.1)
PROT SERPL-MCNC: 5.3 G/DL (ref 6–8.4)
RBC # BLD AUTO: 3.75 M/UL (ref 4.6–6.2)
SODIUM SERPL-SCNC: 136 MMOL/L (ref 136–145)
WBC # BLD AUTO: 11.14 K/UL (ref 3.9–12.7)

## 2024-03-21 PROCEDURE — 36415 COLL VENOUS BLD VENIPUNCTURE: CPT | Performed by: INTERNAL MEDICINE

## 2024-03-21 PROCEDURE — 25000003 PHARM REV CODE 250: Performed by: INTERNAL MEDICINE

## 2024-03-21 PROCEDURE — 84100 ASSAY OF PHOSPHORUS: CPT | Performed by: NURSE PRACTITIONER

## 2024-03-21 PROCEDURE — 80053 COMPREHEN METABOLIC PANEL: CPT | Performed by: INTERNAL MEDICINE

## 2024-03-21 PROCEDURE — 63600175 PHARM REV CODE 636 W HCPCS: Performed by: INTERNAL MEDICINE

## 2024-03-21 PROCEDURE — 25000003 PHARM REV CODE 250: Performed by: NURSE PRACTITIONER

## 2024-03-21 PROCEDURE — 83735 ASSAY OF MAGNESIUM: CPT | Performed by: INTERNAL MEDICINE

## 2024-03-21 PROCEDURE — 85025 COMPLETE CBC W/AUTO DIFF WBC: CPT | Performed by: INTERNAL MEDICINE

## 2024-03-21 RX ORDER — COLESEVELAM 180 1/1
1875 TABLET ORAL 2 TIMES DAILY WITH MEALS
Qty: 180 TABLET | Refills: 0 | Status: SHIPPED | OUTPATIENT
Start: 2024-03-21 | End: 2024-04-09

## 2024-03-21 RX ORDER — PREDNISONE 20 MG/1
40 TABLET ORAL DAILY
Qty: 42 TABLET | Refills: 0 | Status: SHIPPED | OUTPATIENT
Start: 2024-03-21 | End: 2024-04-11

## 2024-03-21 RX ADMIN — DIPHENOXYLATE HYDROCHLORIDE AND ATROPINE SULFATE 1 TABLET: 2.5; .025 TABLET ORAL at 08:03

## 2024-03-21 RX ADMIN — METOPROLOL SUCCINATE 50 MG: 50 TABLET, EXTENDED RELEASE ORAL at 08:03

## 2024-03-21 RX ADMIN — BUSPIRONE HYDROCHLORIDE 7.5 MG: 5 TABLET ORAL at 08:03

## 2024-03-21 RX ADMIN — COLESEVELAM 1875 MG: 180 TABLET ORAL at 06:03

## 2024-03-21 RX ADMIN — HYDROCODONE BITARTRATE AND ACETAMINOPHEN 1 TABLET: 10; 325 TABLET ORAL at 08:03

## 2024-03-21 RX ADMIN — SERTRALINE HYDROCHLORIDE 100 MG: 50 TABLET ORAL at 08:03

## 2024-03-21 RX ADMIN — PREDNISONE 40 MG: 20 TABLET ORAL at 08:03

## 2024-03-21 RX ADMIN — PANTOPRAZOLE SODIUM 40 MG: 40 TABLET, DELAYED RELEASE ORAL at 05:03

## 2024-03-21 NOTE — PLAN OF CARE
Problem: Adult Inpatient Plan of Care  Goal: Plan of Care Review  Outcome: Met  Goal: Patient-Specific Goal (Individualized)  Outcome: Met  Goal: Absence of Hospital-Acquired Illness or Injury  Outcome: Met  Goal: Optimal Comfort and Wellbeing  Outcome: Met  Goal: Readiness for Transition of Care  Outcome: Met     Problem: Bariatric Environmental Safety  Goal: Safety Maintained with Care  Outcome: Met     Problem: Diabetes Comorbidity  Goal: Blood Glucose Level Within Targeted Range  Outcome: Met     Problem: Infection  Goal: Absence of Infection Signs and Symptoms  Outcome: Met     Problem: Fall Injury Risk  Goal: Absence of Fall and Fall-Related Injury  Outcome: Met     Problem: Adjustment to Illness (Bowel Disease, Inflammatory)  Goal: Optimal Adaptation to Chronic Illness  Outcome: Met     Problem: Diarrhea (Bowel Disease, Inflammatory)  Goal: Diarrhea Symptom Relief  Outcome: Met     Problem: Infection (Bowel Disease, Inflammatory)  Goal: Absence of Infection Signs and Symptoms  Outcome: Met     Problem: Nutrition Impaired (Bowel Disease, Inflammatory)  Goal: Optimal Nutrition  Outcome: Met     Problem: Pain (Bowel Disease, Inflammatory)  Goal: Acceptable Pain Control  Outcome: Met

## 2024-03-21 NOTE — DISCHARGE INSTRUCTIONS
take welchol at least 2 hrs away from other medications     Discharge Instructions, [facility] Medicine    Thank you for choosing [facility] for your medical care. The primary doctor who is taking care of you at the time of your discharge is Irma Patel MD.     You were admitted to the hospital with Ulcerative colitis.     Please note your discharge instructions, including diet/activity restrictions, follow-up appointments, and medication changes.  If you have any questions about your medical issues, prescriptions, or any other questions, please feel free to contact the [facility] Medicine Dept at [phone] and we will help.    If you are previously with Home health, outpatient PT/OT or under a therapy program, you are cleared to return to those programs.    Please direct all long term medication refills and follow up to your primary care provider, Hunter Aguilar III, MD. Thank you again for letting us take care of your health care needs.    Please note the following discharge instructions per your discharging physician-  Gely Mccoy PA-C

## 2024-03-21 NOTE — PLAN OF CARE
Secure chat sent to Dr. Jean and Dr. Mccabe regarding Entyvio infusion post hospital discharge - awaiting response at this time     03/21/24 7680   Post-Acute Status   Post-Acute Authorization IV Infusion

## 2024-03-21 NOTE — PLAN OF CARE
Discharge orders and chart reviewed. No other discharge needs noted at this time. Pt is clear for discharge from case management. Pt is discharging to home.    Per Dr. Mccabe, clinic to schedule hospital follow up with GI/OP infusions - no CM needs at this time    Prescription ready for pickup at patient's pharmacy - $10 copayment per pharmacy tech     03/21/24 1156   Final Note   Assessment Type Final Discharge Note   Anticipated Discharge Disposition Home   What phone number can be called within the next 1-3 days to see how you are doing after discharge? [phone]   Hospital Resources/Appts/Education Provided Appointment suggestion unavailable   Post-Acute Status   Discharge Delays None known at this time

## 2024-03-21 NOTE — NURSING
"1316 discharge instructions given and explained to the patient. Patient denies any questions or concerns at this time. Patient stated, "I am waiting on my car to get back." I informed him he is discharge and when his transportation gets here to let us know so that he can be wheeled down.   "

## 2024-03-21 NOTE — DISCHARGE SUMMARY
[facility]  Discharge Summary      Patient Name: Jacoby Jean-Baptiste  MRN: [id number]  DAYSI: [id number]  Patient Class: IP- Inpatient  Admission Date: 3/14/2024  Hospital Length of Stay: 6 days  Discharge Date and Time:  03/21/2024 2:06 PM  Attending Physician: No att. providers found   Discharging Provider: Gely Mccoy PA-C  Primary Care Provider: Hunter Aguilar III, MD    Primary Care Team: Networked reference to record PCT     HPI:   49 year old with a past medicine history of ulcertive colitis, DM, obesity, HLD, HTN, C diff whom presents to the emergency department with reported bloody diarrhea over last 4 days  with associated nausea, vomiting, worsening abdominal pain patient has a history of ulcerative colitis who history of C diff in the past he sees Dr. Galan tree he receives regular infusions and is on budesonide on a daily basis as well despite these medications his symptoms have flared he denies any recorded fever no urinary symptoms reported. Patient reports having 4-6 bloody diarrhea stools a day, on daily prednisone with no relief.   In ER, Dilaudid, zofran and IV fluids started, WBC 15, CT abdomen with colitis-proctitis of the distal descending colon, sigmoid colon and rectum, nonspecific but likely of an acute infectious etiology.Admit to hospital medicine , IV solumedrol TID. Waiting stool panel and rule out c diff before starting abx.     Procedure(s) (LRB):  COLONOSCOPY (N/A)      Hospital Course:   Patient was admitted with ulcerative colitis flare.  He was placed on IV Solu-Medrol and GI was consulted.  Stool for occult blood was positive.  C diff was negative.  He was monitored closely during his stay.  His pain was controlled with both oral and IV narcotics p.r.n..  He underwent bowel prep for colonoscopy.  Colonoscopy revealed severe ulcerative colitis.  His symptoms persisted and he continued to have diarrheal stools.  Welchol and Lomotil were added to  his regimen.  IV steroids were transitioned to oral prednisone.  His hemoglobin hematocrit remained stable.  Patient was seen on day of discharge.  The patient was cleared for discharge by GI.  Discussed case with Dr. Jean with GI who recommended 40 mg prednisone and Welchol on discharge which was prescribed.  Hydrocodone was prescribed on discharge.  Patient to follow up with GI and PCP on discharge.  Return precautions discussed and patient voiced understanding.  All questions answered.       Goals of Care Treatment Preferences:  Code Status: Full Code      Consults:   Consults (From admission, onward)          Status Ordering Provider     Inpatient consult to Gastroenterology  Once        Provider:  David Millan MD    Completed NIDHI VEE            No new Assessment & Plan notes have been filed under this hospital service since the last note was generated.  Service: Hospital Medicine    Final Active Diagnoses:    Diagnosis Date Noted POA    PRINCIPAL PROBLEM:  Ulcerative colitis [K51.90] 04/20/2023 Yes     Chronic    Leukocytosis [D72.829] 01/11/2023 Yes    Type 2 diabetes mellitus without complication, without long-term current use of insulin [E11.9] 01/29/2022 Yes    Morbid obesity [E66.01] 02/03/2021 Yes      Problems Resolved During this Admission:       Discharged Condition: good    Disposition: Home or Self Care    Follow Up:   Follow-up Information       Hunter Aguilar III, MD Follow up in 1 week(s).    Specialty: Family Medicine  Why: As needed  Contact information:  [address] [facility]  [address]  [facility] [address] [phone]               Shan Jean III, MD Follow up.    Specialty: Gastroenterology  Why: clinic to contact you with hospital follow up information/infusion information  Contact information:  [address] [facility] [address] [phone]                           Patient Instructions:      Diet diabetic     Diet diabetic     Notify your health care  provider if you experience any of the following:  temperature >100.4     Notify your health care provider if you experience any of the following:  persistent nausea and vomiting or diarrhea     Notify your health care provider if you experience any of the following:  severe uncontrolled pain     Notify your health care provider if you experience any of the following:  difficulty breathing or increased cough     Notify your health care provider if you experience any of the following:  persistent dizziness, light-headedness, or visual disturbances     Notify your health care provider if you experience any of the following:  increased confusion or weakness     Notify your health care provider if you experience any of the following:  temperature >100.4     Notify your health care provider if you experience any of the following:  persistent nausea and vomiting or diarrhea     Notify your health care provider if you experience any of the following:  severe uncontrolled pain     Notify your health care provider if you experience any of the following:  increased confusion or weakness     Notify your health care provider if you experience any of the following:  persistent dizziness, light-headedness, or visual disturbances     Notify your health care provider if you experience any of the following:  temperature >100.4     Notify your health care provider if you experience any of the following:  persistent nausea and vomiting or diarrhea     Notify your health care provider if you experience any of the following:  severe uncontrolled pain     Notify your health care provider if you experience any of the following:  redness, tenderness, or signs of infection (pain, swelling, redness, odor or green/yellow discharge around incision site)     Notify your health care provider if you experience any of the following:  difficulty breathing or increased cough     Notify your health care provider if you experience any of the following:   increased confusion or weakness     Activity as tolerated     Activity as tolerated       Significant Diagnostic Studies: Labs: CMP   Recent Labs   Lab 03/20/24  0545 03/21/24  0423   * 136   K 4.5 3.8   CL 99 100   CO2 33* 31*   * 95   BUN 17 16   CREATININE 0.9 0.9   CALCIUM 8.4* 8.0*   PROT 5.6* 5.3*   ALBUMIN 3.1* 2.9*   BILITOT 0.4 0.4   ALKPHOS 47* 45*   AST 9* 8*   ALT 24 20   ANIONGAP 3* 5*    and CBC   Recent Labs   Lab 03/20/24  0545 03/21/24  0423   WBC 11.71 11.14   HGB 12.2* 11.9*   HCT 36.3* 36.2*    261       Pending Diagnostic Studies:       None           Medications:  Reconciled Home Medications:      Medication List        START taking these medications      HYDROcodone-acetaminophen  mg per tablet  Commonly known as: NORCO  Take 1 tablet by mouth every 6 (six) hours as needed for Pain.            CHANGE how you take these medications      colesevelam 625 mg tablet  Commonly known as: WELCHOL  Take 3 tablets (1,875 mg total) by mouth 2 (two) times daily with meals.  What changed: additional instructions            CONTINUE taking these medications      busPIRone 7.5 MG tablet  Commonly known as: BUSPAR  Take 1 tablet (7.5 mg total) by mouth 3 (three) times daily.     dicyclomine 10 MG capsule  Commonly known as: BENTYL  Take 10 mg by mouth 3 (three) times daily as needed.     LORazepam 0.5 MG tablet  Commonly known as: ATIVAN  Take 1 tablet (0.5 mg total) by mouth every 6 (six) hours as needed for Anxiety.     metFORMIN 500 MG ER 24hr tablet  Commonly known as: GLUCOPHAGE-XR  Take 1 tablet (500 mg total) by mouth 2 (two) times daily with meals.     metoprolol succinate 50 MG 24 hr tablet  Commonly known as: TOPROL-XL  Take 1 tablet (50 mg total) by mouth once daily.     OZEMPIC 1 mg/dose (4 mg/3 mL)  Generic drug: semaglutide  Inject 1 mg into the skin every 7 days.     pantoprazole 40 MG tablet  Commonly known as: PROTONIX  Take 1 tablet (40 mg total) by mouth 2 (two)  times daily.     predniSONE 20 MG tablet  Commonly known as: DELTASONE  Take 2 tablets (40 mg total) by mouth once daily. for 21 days     promethazine 25 MG tablet  Commonly known as: PHENERGAN  Take 25 mg by mouth 4 (four) times daily as needed for Nausea.     sertraline 100 MG tablet  Commonly known as: ZOLOFT  Take 1 tablet (100 mg total) by mouth once daily.     simvastatin 20 MG tablet  Commonly known as: ZOCOR  Take 20 mg by mouth every evening.     varenicline 1 mg Tab  Commonly known as: CHANTIX  Take 1 tablet (1 mg total) by mouth 2 (two) times daily.     zolpidem 10 mg Tab  Commonly known as: AMBIEN  Take 1 tablet (10 mg total) by mouth nightly as needed (insomnia).              Indwelling Lines/Drains at time of discharge:   Lines/Drains/Airways       None                   Time spent on the discharge of patient: 35 minutes         Gely Mccoy PA-C  Department of Hospital Medicine  Formerly [facility]

## 2024-03-25 ENCOUNTER — PATIENT OUTREACH (OUTPATIENT)
Dept: ADMINISTRATIVE | Facility: CLINIC | Age: 49
End: 2024-03-25
Payer: COMMERCIAL

## 2024-03-25 LAB
LABCORP MISC TEST CODE: NORMAL
LABCORP MISC TEST NAME: NORMAL
LABCORP MISCELLANEOUS TEST: NORMAL

## 2024-04-04 DIAGNOSIS — E11.9 TYPE 2 DIABETES MELLITUS WITHOUT COMPLICATION, WITHOUT LONG-TERM CURRENT USE OF INSULIN: ICD-10-CM

## 2024-04-04 NOTE — TELEPHONE ENCOUNTER
----- Message from Naresh Rojas sent at 4/4/2024 11:55 AM CDT -----  Regarding: pharmacy  Contact: [facility]  Type:  Pharmacy Calling to Clarify an RX    Name of Caller:  [facility] - [place] - [address]   [address] [facility] [address]  Phone: [phone] Fax: [phone]  Pharmacy Name:semaglutide (OZEMPIC) 1 mg/dose (4 mg/3 mL)  Prescription Name:semaglutide (OZEMPIC) 1 mg/dose (4 mg/3 mL)  What do they need to clarify?:90 day refill  Best Call Back Number:  Additional Information:

## 2024-04-05 ENCOUNTER — PATIENT MESSAGE (OUTPATIENT)
Dept: FAMILY MEDICINE | Facility: CLINIC | Age: 49
End: 2024-04-05
Payer: COMMERCIAL

## 2024-04-05 RX ORDER — SEMAGLUTIDE 1.34 MG/ML
1 INJECTION, SOLUTION SUBCUTANEOUS
Qty: 3 EACH | Refills: 1 | Status: SHIPPED | OUTPATIENT
Start: 2024-04-05

## 2024-04-09 ENCOUNTER — OFFICE VISIT (OUTPATIENT)
Dept: FAMILY MEDICINE | Facility: CLINIC | Age: 49
End: 2024-04-09
Payer: COMMERCIAL

## 2024-04-09 VITALS
SYSTOLIC BLOOD PRESSURE: 138 MMHG | TEMPERATURE: 99 F | BODY MASS INDEX: 48.09 KG/M2 | DIASTOLIC BLOOD PRESSURE: 68 MMHG | OXYGEN SATURATION: 95 % | HEART RATE: 93 BPM | WEIGHT: 289 LBS

## 2024-04-09 DIAGNOSIS — F41.9 ANXIETY: ICD-10-CM

## 2024-04-09 DIAGNOSIS — K51.911 ULCERATIVE COLITIS WITH RECTAL BLEEDING, UNSPECIFIED LOCATION: Primary | Chronic | ICD-10-CM

## 2024-04-09 DIAGNOSIS — I48.91 ATRIAL FIBRILLATION, UNSPECIFIED TYPE: ICD-10-CM

## 2024-04-09 DIAGNOSIS — Z87.891 FORMER SMOKER: ICD-10-CM

## 2024-04-09 DIAGNOSIS — E78.5 HYPERLIPIDEMIA, UNSPECIFIED HYPERLIPIDEMIA TYPE: ICD-10-CM

## 2024-04-09 DIAGNOSIS — R20.0 FACIAL NUMBNESS: ICD-10-CM

## 2024-04-09 DIAGNOSIS — E11.9 TYPE 2 DIABETES MELLITUS WITHOUT COMPLICATION, WITHOUT LONG-TERM CURRENT USE OF INSULIN: ICD-10-CM

## 2024-04-09 DIAGNOSIS — Z86.79 S/P RADIOFREQUENCY ABLATION OPERATION FOR ARRHYTHMIA: ICD-10-CM

## 2024-04-09 DIAGNOSIS — G47.00 INSOMNIA, UNSPECIFIED TYPE: ICD-10-CM

## 2024-04-09 DIAGNOSIS — Z98.890 S/P RADIOFREQUENCY ABLATION OPERATION FOR ARRHYTHMIA: ICD-10-CM

## 2024-04-09 DIAGNOSIS — Z79.52 LONG TERM SYSTEMIC STEROID USER: ICD-10-CM

## 2024-04-09 DIAGNOSIS — E66.01 MORBID OBESITY: ICD-10-CM

## 2024-04-09 PROCEDURE — 3075F SYST BP GE 130 - 139MM HG: CPT | Mod: CPTII,S$GLB,, | Performed by: FAMILY MEDICINE

## 2024-04-09 PROCEDURE — 1160F RVW MEDS BY RX/DR IN RCRD: CPT | Mod: CPTII,S$GLB,, | Performed by: FAMILY MEDICINE

## 2024-04-09 PROCEDURE — 3008F BODY MASS INDEX DOCD: CPT | Mod: CPTII,S$GLB,, | Performed by: FAMILY MEDICINE

## 2024-04-09 PROCEDURE — 3078F DIAST BP <80 MM HG: CPT | Mod: CPTII,S$GLB,, | Performed by: FAMILY MEDICINE

## 2024-04-09 PROCEDURE — 3044F HG A1C LEVEL LT 7.0%: CPT | Mod: CPTII,S$GLB,, | Performed by: FAMILY MEDICINE

## 2024-04-09 PROCEDURE — 99999 PR PBB SHADOW E&M-EST. PATIENT-LVL IV: CPT | Mod: PBBFAC,,, | Performed by: FAMILY MEDICINE

## 2024-04-09 PROCEDURE — 3072F LOW RISK FOR RETINOPATHY: CPT | Mod: CPTII,S$GLB,, | Performed by: FAMILY MEDICINE

## 2024-04-09 PROCEDURE — 99214 OFFICE O/P EST MOD 30 MIN: CPT | Mod: S$GLB,,, | Performed by: FAMILY MEDICINE

## 2024-04-09 PROCEDURE — 1111F DSCHRG MED/CURRENT MED MERGE: CPT | Mod: CPTII,S$GLB,, | Performed by: FAMILY MEDICINE

## 2024-04-09 PROCEDURE — 1159F MED LIST DOCD IN RCRD: CPT | Mod: CPTII,S$GLB,, | Performed by: FAMILY MEDICINE

## 2024-04-09 RX ORDER — ZOLPIDEM TARTRATE 10 MG/1
10 TABLET ORAL NIGHTLY PRN
Qty: 30 TABLET | Refills: 2 | Status: SHIPPED | OUTPATIENT
Start: 2024-04-09 | End: 2024-10-08

## 2024-04-09 RX ORDER — CHOLESTYRAMINE 4 G/9G
1 POWDER, FOR SUSPENSION ORAL 3 TIMES DAILY
COMMUNITY
Start: 2024-03-27

## 2024-04-10 ENCOUNTER — LAB VISIT (OUTPATIENT)
Dept: LAB | Facility: HOSPITAL | Age: 49
End: 2024-04-10
Attending: FAMILY MEDICINE
Payer: COMMERCIAL

## 2024-04-10 DIAGNOSIS — R20.0 FACIAL NUMBNESS: ICD-10-CM

## 2024-04-10 LAB
25(OH)D3+25(OH)D2 SERPL-MCNC: 9 NG/ML (ref 30–96)
TSH SERPL DL<=0.005 MIU/L-ACNC: 1.1 UIU/ML (ref 0.34–5.6)
VIT B12 SERPL-MCNC: 344 PG/ML (ref 210–950)

## 2024-04-10 PROCEDURE — 82607 VITAMIN B-12: CPT | Performed by: FAMILY MEDICINE

## 2024-04-10 PROCEDURE — 36415 COLL VENOUS BLD VENIPUNCTURE: CPT | Performed by: FAMILY MEDICINE

## 2024-04-10 PROCEDURE — 82306 VITAMIN D 25 HYDROXY: CPT | Performed by: FAMILY MEDICINE

## 2024-04-10 PROCEDURE — 84443 ASSAY THYROID STIM HORMONE: CPT | Performed by: FAMILY MEDICINE

## 2024-04-11 PROBLEM — Z86.79 S/P RADIOFREQUENCY ABLATION OPERATION FOR ARRHYTHMIA: Status: ACTIVE | Noted: 2021-02-03

## 2024-04-11 PROBLEM — Z79.52 LONG TERM SYSTEMIC STEROID USER: Status: ACTIVE | Noted: 2024-04-11

## 2024-04-12 RX ORDER — ERGOCALCIFEROL 1.25 MG/1
50000 CAPSULE ORAL
COMMUNITY
End: 2024-04-12 | Stop reason: SDUPTHER

## 2024-04-12 RX ORDER — ERGOCALCIFEROL 1.25 MG/1
50000 CAPSULE ORAL
Qty: 12 CAPSULE | Refills: 1 | Status: SHIPPED | OUTPATIENT
Start: 2024-04-12

## 2024-04-12 NOTE — PROGRESS NOTES
Subjective:       Patient ID: Jacoby Jean-Baptiste is a 49 y.o. male.    Chief Complaint: Follow-up    Ulcerative colitis is not doing well.  Diarrhea and bleeding.  Waiting on new medication.  Long-term use of prednisone.  Since his last hospital stay.  Unable to work now due to the ulcerative colitis.  Hyperlipidemia.  Atrial fibrillation.  Status post radiofrequency ablation.  Anxiety stable.  Diabetes mellitus type 2 A1c 5.7 morbid obesity BMI of 48.  Insomnia issues.  Former smoker quit November of 2023 on Chantix.  Facial numbness right lower face for about 3 weeks now.  Has not changed any.    Physical examination.  Vital signs stable.  Overweight male no acute distress neck without bruit.  Chest clear.  Heart regular rate and rhythm.  Abdomen bowel sounds are positive soft but generally tender.  Feet positive pulses.  2+.  No calluses skin breakdown or ulcerations.  Light touch intact 5 of 5 spots tested each foot.  Describes area of paresthesia in a circular area centered on the right corner of the mouth and extending out about 1 in at most.        Objective:        Assessment:       1. Ulcerative colitis with rectal bleeding, unspecified location    2. Hyperlipidemia, unspecified hyperlipidemia type    3. Atrial fibrillation, unspecified type    4. S/P radiofrequency ablation operation for arrhythmia    5. Anxiety    6. Type 2 diabetes mellitus without complication, without long-term current use of insulin    7. Morbid obesity    8. Insomnia, unspecified type    9. Former smoker    10. Facial numbness    11. Long term systemic steroid user        Plan:       Ulcerative colitis with rectal bleeding, unspecified location    Hyperlipidemia, unspecified hyperlipidemia type    Atrial fibrillation, unspecified type    S/P radiofrequency ablation operation for arrhythmia    Anxiety    Type 2 diabetes mellitus without complication, without long-term current use of insulin    Morbid obesity    Insomnia, unspecified  type  -     zolpidem (AMBIEN) 10 mg Tab; Take 1 tablet (10 mg total) by mouth nightly as needed (insomnia).  Dispense: 30 tablet; Refill: 2    Former smoker    Facial numbness  -     Vitamin B12; Future; Expected date: 04/09/2024  -     TSH; Future; Expected date: 04/09/2024  -     Vitamin D; Future; Expected date: 04/09/2024    Long term systemic steroid user    Check B12 level TSH and vitamin-D.  Treat further after we have results back.  Continue his current medications.  Continue steroids for now.  Monitor glucose.

## 2024-05-03 ENCOUNTER — HOSPITAL ENCOUNTER (INPATIENT)
Facility: HOSPITAL | Age: 49
LOS: 4 days | Discharge: HOME | DRG: 386 | End: 2024-05-09
Attending: EMERGENCY MEDICINE | Admitting: HOSPITALIST
Payer: COMMERCIAL

## 2024-05-03 DIAGNOSIS — E66.01 MORBID OBESITY: Primary | ICD-10-CM

## 2024-05-03 DIAGNOSIS — E11.9 TYPE 2 DIABETES MELLITUS WITHOUT COMPLICATION, WITHOUT LONG-TERM CURRENT USE OF INSULIN: ICD-10-CM

## 2024-05-03 DIAGNOSIS — K51.90 ULCERATIVE COLITIS: ICD-10-CM

## 2024-05-03 DIAGNOSIS — R07.9 CHEST PAIN: ICD-10-CM

## 2024-05-03 LAB
ALBUMIN SERPL BCP-MCNC: 3.6 G/DL (ref 3.5–5.2)
ALP SERPL-CCNC: 67 U/L (ref 55–135)
ALT SERPL W/O P-5'-P-CCNC: 15 U/L (ref 10–44)
ANION GAP SERPL CALC-SCNC: 6 MMOL/L (ref 8–16)
AST SERPL-CCNC: 10 U/L (ref 10–40)
BASOPHILS # BLD AUTO: 0.09 K/UL (ref 0–0.2)
BASOPHILS NFR BLD: 0.6 % (ref 0–1.9)
BILIRUB SERPL-MCNC: 0.5 MG/DL (ref 0.1–1)
BILIRUB UR QL STRIP: NEGATIVE
BUN SERPL-MCNC: 11 MG/DL (ref 6–20)
CALCIUM SERPL-MCNC: 8.6 MG/DL (ref 8.7–10.5)
CHLORIDE SERPL-SCNC: 104 MMOL/L (ref 95–110)
CLARITY UR: CLEAR
CO2 SERPL-SCNC: 27 MMOL/L (ref 23–29)
COLOR UR: YELLOW
CREAT SERPL-MCNC: 1 MG/DL (ref 0.5–1.4)
DIFFERENTIAL METHOD BLD: ABNORMAL
EOSINOPHIL # BLD AUTO: 0.8 K/UL (ref 0–0.5)
EOSINOPHIL NFR BLD: 4.7 % (ref 0–8)
ERYTHROCYTE [DISTWIDTH] IN BLOOD BY AUTOMATED COUNT: 17.9 % (ref 11.5–14.5)
EST. GFR  (NO RACE VARIABLE): >60 ML/MIN/1.73 M^2
GLUCOSE SERPL-MCNC: 86 MG/DL (ref 70–110)
GLUCOSE UR QL STRIP: NEGATIVE
HCT VFR BLD AUTO: 35.3 % (ref 40–54)
HGB BLD-MCNC: 10.9 G/DL (ref 14–18)
HGB UR QL STRIP: NEGATIVE
IMM GRANULOCYTES # BLD AUTO: 0.17 K/UL (ref 0–0.04)
IMM GRANULOCYTES NFR BLD AUTO: 1 % (ref 0–0.5)
KETONES UR QL STRIP: NEGATIVE
LEUKOCYTE ESTERASE UR QL STRIP: NEGATIVE
LIPASE SERPL-CCNC: 6 U/L (ref 4–60)
LYMPHOCYTES # BLD AUTO: 2 K/UL (ref 1–4.8)
LYMPHOCYTES NFR BLD: 12.3 % (ref 18–48)
MCH RBC QN AUTO: 31 PG (ref 27–31)
MCHC RBC AUTO-ENTMCNC: 30.9 G/DL (ref 32–36)
MCV RBC AUTO: 100 FL (ref 82–98)
MONOCYTES # BLD AUTO: 1.6 K/UL (ref 0.3–1)
MONOCYTES NFR BLD: 9.8 % (ref 4–15)
NEUTROPHILS # BLD AUTO: 11.6 K/UL (ref 1.8–7.7)
NEUTROPHILS NFR BLD: 71.6 % (ref 38–73)
NITRITE UR QL STRIP: NEGATIVE
NRBC BLD-RTO: 0 /100 WBC
PH UR STRIP: 6 [PH] (ref 5–8)
PLATELET # BLD AUTO: 354 K/UL (ref 150–450)
PMV BLD AUTO: 9.4 FL (ref 9.2–12.9)
POTASSIUM SERPL-SCNC: 4.1 MMOL/L (ref 3.5–5.1)
PROT SERPL-MCNC: 7 G/DL (ref 6–8.4)
PROT UR QL STRIP: NEGATIVE
RBC # BLD AUTO: 3.52 M/UL (ref 4.6–6.2)
SODIUM SERPL-SCNC: 137 MMOL/L (ref 136–145)
SP GR UR STRIP: 1.02 (ref 1–1.03)
URN SPEC COLLECT METH UR: NORMAL
UROBILINOGEN UR STRIP-ACNC: NEGATIVE EU/DL
WBC # BLD AUTO: 16.2 K/UL (ref 3.9–12.7)

## 2024-05-03 PROCEDURE — 25500020 PHARM REV CODE 255: Performed by: NURSE PRACTITIONER

## 2024-05-03 PROCEDURE — 81003 URINALYSIS AUTO W/O SCOPE: CPT | Performed by: NURSE PRACTITIONER

## 2024-05-03 PROCEDURE — 87324 CLOSTRIDIUM AG IA: CPT | Performed by: INTERNAL MEDICINE

## 2024-05-03 PROCEDURE — 96372 THER/PROPH/DIAG INJ SC/IM: CPT | Performed by: INTERNAL MEDICINE

## 2024-05-03 PROCEDURE — G0378 HOSPITAL OBSERVATION PER HR: HCPCS

## 2024-05-03 PROCEDURE — 96375 TX/PRO/DX INJ NEW DRUG ADDON: CPT

## 2024-05-03 PROCEDURE — 63600175 PHARM REV CODE 636 W HCPCS: Performed by: NURSE PRACTITIONER

## 2024-05-03 PROCEDURE — 25000003 PHARM REV CODE 250: Performed by: INTERNAL MEDICINE

## 2024-05-03 PROCEDURE — 99285 EMERGENCY DEPT VISIT HI MDM: CPT | Mod: 25

## 2024-05-03 PROCEDURE — 85025 COMPLETE CBC W/AUTO DIFF WBC: CPT | Performed by: NURSE PRACTITIONER

## 2024-05-03 PROCEDURE — 87449 NOS EACH ORGANISM AG IA: CPT | Performed by: INTERNAL MEDICINE

## 2024-05-03 PROCEDURE — 96365 THER/PROPH/DIAG IV INF INIT: CPT

## 2024-05-03 PROCEDURE — 25000003 PHARM REV CODE 250: Performed by: NURSE PRACTITIONER

## 2024-05-03 PROCEDURE — 83690 ASSAY OF LIPASE: CPT | Performed by: NURSE PRACTITIONER

## 2024-05-03 PROCEDURE — 96361 HYDRATE IV INFUSION ADD-ON: CPT

## 2024-05-03 PROCEDURE — 63600175 PHARM REV CODE 636 W HCPCS: Performed by: INTERNAL MEDICINE

## 2024-05-03 PROCEDURE — 80053 COMPREHEN METABOLIC PANEL: CPT | Performed by: NURSE PRACTITIONER

## 2024-05-03 RX ORDER — LANOLIN ALCOHOL/MO/W.PET/CERES
800 CREAM (GRAM) TOPICAL
Status: DISCONTINUED | OUTPATIENT
Start: 2024-05-03 | End: 2024-05-09 | Stop reason: HOSPADM

## 2024-05-03 RX ORDER — HYDROCODONE BITARTRATE AND ACETAMINOPHEN 5; 325 MG/1; MG/1
1 TABLET ORAL EVERY 6 HOURS PRN
Status: DISCONTINUED | OUTPATIENT
Start: 2024-05-03 | End: 2024-05-04

## 2024-05-03 RX ORDER — SODIUM,POTASSIUM PHOSPHATES 280-250MG
2 POWDER IN PACKET (EA) ORAL
Status: DISCONTINUED | OUTPATIENT
Start: 2024-05-03 | End: 2024-05-09 | Stop reason: HOSPADM

## 2024-05-03 RX ORDER — ONDANSETRON HYDROCHLORIDE 2 MG/ML
4 INJECTION, SOLUTION INTRAVENOUS EVERY 6 HOURS PRN
Status: DISCONTINUED | OUTPATIENT
Start: 2024-05-03 | End: 2024-05-09 | Stop reason: HOSPADM

## 2024-05-03 RX ORDER — ACETAMINOPHEN 325 MG/1
650 TABLET ORAL EVERY 4 HOURS PRN
Status: DISCONTINUED | OUTPATIENT
Start: 2024-05-03 | End: 2024-05-09 | Stop reason: HOSPADM

## 2024-05-03 RX ORDER — ENOXAPARIN SODIUM 100 MG/ML
40 INJECTION SUBCUTANEOUS EVERY 24 HOURS
Status: DISCONTINUED | OUTPATIENT
Start: 2024-05-03 | End: 2024-05-03

## 2024-05-03 RX ORDER — INSULIN ASPART 100 [IU]/ML
0-10 INJECTION, SOLUTION INTRAVENOUS; SUBCUTANEOUS
Status: DISCONTINUED | OUTPATIENT
Start: 2024-05-03 | End: 2024-05-09 | Stop reason: HOSPADM

## 2024-05-03 RX ORDER — ZOLPIDEM TARTRATE 5 MG/1
10 TABLET ORAL NIGHTLY PRN
Status: DISCONTINUED | OUTPATIENT
Start: 2024-05-03 | End: 2024-05-09 | Stop reason: HOSPADM

## 2024-05-03 RX ORDER — IBUPROFEN 200 MG
24 TABLET ORAL
Status: DISCONTINUED | OUTPATIENT
Start: 2024-05-03 | End: 2024-05-09 | Stop reason: HOSPADM

## 2024-05-03 RX ORDER — TALC
6 POWDER (GRAM) TOPICAL NIGHTLY PRN
Status: DISCONTINUED | OUTPATIENT
Start: 2024-05-03 | End: 2024-05-09 | Stop reason: HOSPADM

## 2024-05-03 RX ORDER — SODIUM CHLORIDE 9 MG/ML
INJECTION, SOLUTION INTRAVENOUS CONTINUOUS
Status: DISCONTINUED | OUTPATIENT
Start: 2024-05-03 | End: 2024-05-09 | Stop reason: HOSPADM

## 2024-05-03 RX ORDER — GLUCAGON 1 MG
1 KIT INJECTION
Status: DISCONTINUED | OUTPATIENT
Start: 2024-05-03 | End: 2024-05-09 | Stop reason: HOSPADM

## 2024-05-03 RX ORDER — LEVOFLOXACIN 5 MG/ML
500 INJECTION, SOLUTION INTRAVENOUS
Status: DISCONTINUED | OUTPATIENT
Start: 2024-05-03 | End: 2024-05-06

## 2024-05-03 RX ORDER — CHOLESTYRAMINE 4 G/4.8G
1 POWDER, FOR SUSPENSION ORAL 3 TIMES DAILY
Status: DISCONTINUED | OUTPATIENT
Start: 2024-05-03 | End: 2024-05-09 | Stop reason: HOSPADM

## 2024-05-03 RX ORDER — ONDANSETRON HYDROCHLORIDE 2 MG/ML
4 INJECTION, SOLUTION INTRAVENOUS
Status: COMPLETED | OUTPATIENT
Start: 2024-05-03 | End: 2024-05-03

## 2024-05-03 RX ORDER — FAMOTIDINE 20 MG/1
20 TABLET, FILM COATED ORAL 2 TIMES DAILY
Status: DISCONTINUED | OUTPATIENT
Start: 2024-05-03 | End: 2024-05-09 | Stop reason: HOSPADM

## 2024-05-03 RX ORDER — IBUPROFEN 200 MG
16 TABLET ORAL
Status: DISCONTINUED | OUTPATIENT
Start: 2024-05-03 | End: 2024-05-09 | Stop reason: HOSPADM

## 2024-05-03 RX ORDER — METRONIDAZOLE 500 MG/100ML
500 INJECTION, SOLUTION INTRAVENOUS
Status: DISCONTINUED | OUTPATIENT
Start: 2024-05-03 | End: 2024-05-06

## 2024-05-03 RX ORDER — ENOXAPARIN SODIUM 100 MG/ML
40 INJECTION SUBCUTANEOUS EVERY 12 HOURS
Status: DISCONTINUED | OUTPATIENT
Start: 2024-05-03 | End: 2024-05-09 | Stop reason: HOSPADM

## 2024-05-03 RX ORDER — LORAZEPAM 0.5 MG/1
0.5 TABLET ORAL EVERY 6 HOURS PRN
Status: DISCONTINUED | OUTPATIENT
Start: 2024-05-03 | End: 2024-05-09 | Stop reason: HOSPADM

## 2024-05-03 RX ORDER — ALUMINUM HYDROXIDE, MAGNESIUM HYDROXIDE, AND SIMETHICONE 1200; 120; 1200 MG/30ML; MG/30ML; MG/30ML
30 SUSPENSION ORAL 4 TIMES DAILY PRN
Status: DISCONTINUED | OUTPATIENT
Start: 2024-05-03 | End: 2024-05-09 | Stop reason: HOSPADM

## 2024-05-03 RX ORDER — METOPROLOL SUCCINATE 50 MG/1
50 TABLET, EXTENDED RELEASE ORAL DAILY
Status: DISCONTINUED | OUTPATIENT
Start: 2024-05-04 | End: 2024-05-09 | Stop reason: HOSPADM

## 2024-05-03 RX ORDER — ACETAMINOPHEN 325 MG/1
650 TABLET ORAL EVERY 8 HOURS PRN
Status: DISCONTINUED | OUTPATIENT
Start: 2024-05-03 | End: 2024-05-09 | Stop reason: HOSPADM

## 2024-05-03 RX ORDER — HYDROMORPHONE HYDROCHLORIDE 1 MG/ML
1 INJECTION, SOLUTION INTRAMUSCULAR; INTRAVENOUS; SUBCUTANEOUS
Status: COMPLETED | OUTPATIENT
Start: 2024-05-03 | End: 2024-05-03

## 2024-05-03 RX ADMIN — HYDROCODONE BITARTRATE AND ACETAMINOPHEN 1 TABLET: 5; 325 TABLET ORAL at 10:05

## 2024-05-03 RX ADMIN — IOHEXOL 100 ML: 350 INJECTION, SOLUTION INTRAVENOUS at 04:05

## 2024-05-03 RX ADMIN — HYDROMORPHONE HYDROCHLORIDE 1 MG: 1 INJECTION, SOLUTION INTRAMUSCULAR; INTRAVENOUS; SUBCUTANEOUS at 03:05

## 2024-05-03 RX ADMIN — ZOLPIDEM TARTRATE 10 MG: 5 TABLET, COATED ORAL at 10:05

## 2024-05-03 RX ADMIN — ENOXAPARIN SODIUM 40 MG: 40 INJECTION SUBCUTANEOUS at 10:05

## 2024-05-03 RX ADMIN — SODIUM CHLORIDE: 9 INJECTION, SOLUTION INTRAVENOUS at 10:05

## 2024-05-03 RX ADMIN — FAMOTIDINE 20 MG: 20 TABLET ORAL at 10:05

## 2024-05-03 RX ADMIN — ONDANSETRON 4 MG: 2 INJECTION INTRAMUSCULAR; INTRAVENOUS at 02:05

## 2024-05-03 RX ADMIN — PIPERACILLIN SODIUM AND TAZOBACTAM SODIUM 4.5 G: 4; .5 INJECTION, POWDER, LYOPHILIZED, FOR SOLUTION INTRAVENOUS at 07:05

## 2024-05-03 RX ADMIN — LEVOFLOXACIN 500 MG: 5 INJECTION, SOLUTION INTRAVENOUS at 10:05

## 2024-05-03 RX ADMIN — SODIUM CHLORIDE 1000 ML: 9 INJECTION, SOLUTION INTRAVENOUS at 02:05

## 2024-05-03 RX ADMIN — METRONIDAZOLE 500 MG: 5 INJECTION, SOLUTION INTRAVENOUS at 10:05

## 2024-05-03 RX ADMIN — CHOLESTYRAMINE LIGHT 4 G: 4 POWDER, FOR SUSPENSION ORAL at 10:05

## 2024-05-03 RX ADMIN — BUSPIRONE HYDROCHLORIDE 7.5 MG: 5 TABLET ORAL at 10:05

## 2024-05-03 NOTE — H&P
[facility] - Emergency Dept  Hospital Medicine  History & Physical    Patient Name: Jacoby Jean-Baptiste  MRN: [id number]  Patient Class: OP- Observation  Admission Date: 5/3/2024  Attending Physician: Noe Juarez MD   Primary Care Provider: Hunter Aguilar III, MD         Patient information was obtained from patient, past medical records, and ER records.     Subjective:     Principal Problem:Ulcerative colitis    Chief Complaint:   Chief Complaint   Patient presents with    Abdominal Pain    Diarrhea    Nausea     Hx of UC. States flare up x 1 wk with nausea and diarrhea and abd pain        HPI: 49 year old pt getting admitted with ulcerative colitis flare  Pt was admitted with same c/o this year(Feb/March)  Per Pt his last intake of steroids PO was few days ago  He was doing fine and started having profuse loose stools again  Noticed blood and pus in stools  Stools were not smelly as per pt(Has H/o C diff)  Symptoms got worse and pt came to hospital and got admitted   His last colonoscopy was in March this year     Past Medical History:   Diagnosis Date    C. difficile colitis     Cavitary pneumonia 11/2023    pos aspergillus serology    Diabetes mellitus 01/2022    Hyperlipidemia 2015    Hypertension 2015    Insomnia 2015    Ulcerative colitis        Past Surgical History:   Procedure Laterality Date    BRONCHOSCOPY WITH FLUOROSCOPY Left 11/20/2023    Procedure: BRONCHOSCOPY, WITH FLUOROSCOPY;  Surgeon: Lisa Villarreal MD;  Location: [facility];  Service: Pulmonary;  Laterality: Left;    BRONCHOSCOPY WITH FLUOROSCOPY N/A 11/30/2023    Procedure: BRONCHOSCOPY, WITH FLUOROSCOPY;  Surgeon: Lisa Villarreal MD;  Location: [facility];  Service: Pulmonary;  Laterality: N/A;    CARDIAC ELECTROPHYSIOLOGY MAPPING AND ABLATION  2017    SVT    CHOLECYSTECTOMY  2011    COLONOSCOPY N/A 5/3/2022    Procedure: COLONOSCOPY;  Surgeon: Shan Jean III, MD;  Location: [facility];  Service: Endoscopy;   Laterality: N/A;    COLONOSCOPY N/A 3/16/2024    Procedure: COLONOSCOPY;  Surgeon: ALEKSANDER Ramos MD;  Location: [facility];  Service: Endoscopy;  Laterality: N/A;    COLONOSCOPY WITH FECAL MICROBIOTA TRANSFER N/A 3/21/2023    Procedure: COLONOSCOPY, WITH FECAL MICROBIOTA TRANSFER;  Surgeon: David Millan MD;  Location: [facility];  Service: Endoscopy;  Laterality: N/A;    ESOPHAGOGASTRODUODENOSCOPY N/A 4/24/2023    Procedure: EGD (ESOPHAGOGASTRODUODENOSCOPY);  Surgeon: Shan Jean III, MD;  Location: [facility];  Service: Endoscopy;  Laterality: N/A;    GANGLION CYST EXCISION Left 1992    UMBILICAL HERNIA REPAIR  2011    with mesh       Review of patient's allergies indicates:  No Known Allergies    Current Facility-Administered Medications   Medication Dose Route Frequency Provider Last Rate Last Admin    0.9%  NaCl infusion   Intravenous Continuous Noe Juarez MD        acetaminophen tablet 650 mg  650 mg Oral Q8H PRN Noe Juarez MD        acetaminophen tablet 650 mg  650 mg Oral Q4H PRN Noe Juarez MD        aluminum-magnesium hydroxide-simethicone 200-200-20 mg/5 mL suspension 30 mL  30 mL Oral QID PRN Noe Juarez MD        busPIRone split tablet 7.5 mg  7.5 mg Oral TID Noe Juarez MD        cholestyramine-aspartame 4 gram packet 4 g  1 packet Oral TID Noe Juarez MD        dextrose 50% injection 12.5 g  12.5 g Intravenous PRN Noe Juarez MD        dextrose 50% injection 25 g  25 g Intravenous PRN Noe Juarez MD        enoxaparin injection 40 mg  40 mg Subcutaneous Q12H (prophylaxis, 0900/2100) Noe Juarez MD        famotidine tablet 20 mg  20 mg Oral BID Noe Juarez MD        glucagon (human recombinant) injection 1 mg  1 mg Intramuscular PRN Noe Juarez MD        glucose chewable tablet 16 g  16 g Oral PRN Noe Juarez MD        glucose chewable tablet 24 g  24 g Oral PRN Noe Juarez MD        HYDROcodone-acetaminophen 5-325 mg per tablet 1 tablet  1 tablet Oral Q6H PRN Noe Juarez MD         insulin aspart U-100 pen 0-10 Units  0-10 Units Subcutaneous QID (AC + HS) PRNoe Dawkins MD        levoFLOXacin 500 mg/100 mL IVPB 500 mg  500 mg Intravenous Q24H Noe Juarez MD        LORazepam tablet 0.5 mg  0.5 mg Oral Q6H Noe Solares MD        magnesium oxide tablet 800 mg  800 mg Oral Noe Solares MD        magnesium oxide tablet 800 mg  800 mg Oral PRNoe Dawkins MD        melatonin tablet 6 mg  6 mg Oral Nightly PRN Noe Juarez MD        methylPREDNISolone sodium succinate injection 60 mg  60 mg Intravenous Q8H Noe Juarez MD        [START ON 5/4/2024] metoprolol succinate (TOPROL-XL) 24 hr tablet 50 mg  50 mg Oral Daily Noe Juarez MD        metronidazole IVPB 500 mg  500 mg Intravenous Q8H Noe Juarez MD        ondansetron injection 4 mg  4 mg Intravenous Q6H PRNoe Dawkins MD        potassium bicarbonate disintegrating tablet 35 mEq  35 mEq Oral Noe Solares MD        potassium bicarbonate disintegrating tablet 50 mEq  50 mEq Oral PRNoe Dawkins MD        potassium bicarbonate disintegrating tablet 60 mEq  60 mEq Oral PRNoe Dawkins MD        potassium, sodium phosphates 280-160-250 mg packet 2 packet  2 packet Oral Noe Solares MD        potassium, sodium phosphates 280-160-250 mg packet 2 packet  2 packet Oral PRNoe Dawkins MD        potassium, sodium phosphates 280-160-250 mg packet 2 packet  2 packet Oral PRNoe Dawkins MD        zolpidem tablet 10 mg  10 mg Oral Nightly PRNoe Dawkins MD         Current Outpatient Medications   Medication Sig Dispense Refill    busPIRone (BUSPAR) 7.5 MG tablet Take 1 tablet (7.5 mg total) by mouth 3 (three) times daily. 270 tablet 1    cholestyramine (QUESTRAN) 4 gram packet Take 1 packet by mouth 3 (three) times daily.      dicyclomine (BENTYL) 10 MG capsule Take 10 mg by mouth 3 (three) times daily as needed.      ergocalciferol (VITAMIN D2) 50,000 unit Cap Take 1 capsule (50,000 Units total) by mouth every 7 days. 12 capsule 1    LORazepam  (ATIVAN) 0.5 MG tablet Take 1 tablet (0.5 mg total) by mouth every 6 (six) hours as needed for Anxiety. 60 tablet 2    metFORMIN (GLUCOPHAGE-XR) 500 MG ER 24hr tablet Take 1 tablet (500 mg total) by mouth 2 (two) times daily with meals. 180 tablet 1    metoprolol succinate (TOPROL-XL) 50 MG 24 hr tablet Take 1 tablet (50 mg total) by mouth once daily. 90 tablet 1    pantoprazole (PROTONIX) 40 MG tablet Take 1 tablet (40 mg total) by mouth 2 (two) times daily. 180 tablet 3    sertraline (ZOLOFT) 100 MG tablet Take 1 tablet (100 mg total) by mouth once daily. 90 tablet 1    simvastatin (ZOCOR) 20 MG tablet Take 20 mg by mouth every evening.      varenicline (CHANTIX) 1 mg Tab Take 1 tablet (1 mg total) by mouth 2 (two) times daily. 180 tablet 1    zolpidem (AMBIEN) 10 mg Tab Take 1 tablet (10 mg total) by mouth nightly as needed (insomnia). 30 tablet 2    promethazine (PHENERGAN) 25 MG tablet Take 25 mg by mouth 4 (four) times daily as needed for Nausea.      semaglutide (OZEMPIC) 1 mg/dose (4 mg/3 mL) Inject 1 mg into the skin every 7 days. SATURDAYS 3 each 1     Facility-Administered Medications Ordered in Other Encounters   Medication Dose Route Frequency Provider Last Rate Last Admin    lactated ringers infusion   Intravenous Continuous David Millan MD 75 mL/hr at 03/21/23 1252 New Bag at 03/21/23 1252     Family History       Problem Relation (Age of Onset)    Asthma Mother          Tobacco Use    Smoking status: Former     Current packs/day: 1.00     Average packs/day: 1 pack/day for 31.3 years (31.3 ttl pk-yrs)     Types: Cigarettes     Start date: 1993    Smokeless tobacco: Never    Tobacco comments:     Pt states he has stopped smoking with Chantix three weeks ago. 12/1/23   Substance and Sexual Activity    Alcohol use: Yes     Comment: ocass    Drug use: Not Currently    Sexual activity: Not Currently     Review of Systems   Constitutional:  Negative for activity change and appetite change.   HENT:   Negative for congestion and dental problem.    Eyes:  Negative for discharge and itching.   Respiratory:  Negative for shortness of breath.    Cardiovascular:  Negative for chest pain.   Gastrointestinal:  Positive for blood in stool and diarrhea. Negative for abdominal distention and abdominal pain.   Endocrine: Negative for cold intolerance.   Genitourinary:  Negative for difficulty urinating and dysuria.   Musculoskeletal:  Negative for arthralgias and back pain.   Skin:  Negative for color change.   Neurological:  Negative for dizziness and facial asymmetry.   Hematological:  Negative for adenopathy.   Psychiatric/Behavioral:  Negative for agitation and behavioral problems.      Objective:     Vital Signs (Most Recent):  Temp: 98.3 °F (36.8 °C) (05/03/24 1202)  Pulse: 78 (05/03/24 1902)  Resp: 18 (05/03/24 1542)  BP: (!) 121/56 (05/03/24 1902)  SpO2: 98 % (05/03/24 1902) Vital Signs (24h Range):  Temp:  [98.3 °F (36.8 °C)] 98.3 °F (36.8 °C)  Pulse:  [78-87] 78  Resp:  [18] 18  SpO2:  [98 %-100 %] 98 %  BP: (121-151)/(56-98) 121/56     Weight: 131.1 kg (289 lb)  Body mass index is 48.09 kg/m².     Physical Exam  Vitals and nursing note reviewed.   Constitutional:       Appearance: He is well-developed.   HENT:      Head: Atraumatic.      Right Ear: External ear normal.      Left Ear: External ear normal.      Nose: Nose normal.      Mouth/Throat:      Mouth: Mucous membranes are moist.   Cardiovascular:      Rate and Rhythm: Normal rate.   Pulmonary:      Effort: Pulmonary effort is normal.   Musculoskeletal:         General: Normal range of motion.      Cervical back: Full passive range of motion without pain and normal range of motion.   Skin:     General: Skin is dry.   Neurological:      Mental Status: He is alert and oriented to person, place, and time.   Psychiatric:         Behavior: Behavior normal.                Significant Labs: All pertinent labs within the past 24 hours have been reviewed.  CBC:  "  Recent Labs   Lab 05/03/24  1401   WBC 16.20*   HGB 10.9*   HCT 35.3*        CMP:   Recent Labs   Lab 05/03/24  1401      K 4.1      CO2 27   GLU 86   BUN 11   CREATININE 1.0   CALCIUM 8.6*   PROT 7.0   ALBUMIN 3.6   BILITOT 0.5   ALKPHOS 67   AST 10   ALT 15   ANIONGAP 6*       Significant Imaging: I have reviewed all pertinent imaging results/findings within the past 24 hours.    Assessment/Plan:     * Ulcerative colitis  Getting admitted with U colitis flare  Start iv steroids/iv abx   Obtain  stool to r/o C diff   Maintain cholestyramine PO  Stop Metformin/sertraline in view with loose stools  Stop PPI in view with C diff H/o       Long term systemic steroid user  Last PO Budesonide intake was few weeks ago      Atrial fibrillation, unspecified type  Per chart review    PUD (peptic ulcer disease)  Maintain pepcid      History of Clostridioides difficile colitis  Aware       BMI 45.0-49.9, adult  Body mass index is 48.09 kg/m². Morbid obesity complicates all aspects of disease management from diagnostic modalities to treatment.     Type 2 diabetes mellitus without complication, without long-term current use of insulin  Patient's FSGs are controlled on current medication regimen.  Last A1c reviewed-   Lab Results   Component Value Date    HGBA1C 5.7 03/16/2024     Most recent fingerstick glucose reviewed- No results for input(s): "POCTGLUCOSE" in the last 24 hours.  Current correctional scale  Medium  Maintain anti-hyperglycemic dose as follows-   Antihyperglycemics (From admission, onward)      Start     Stop Route Frequency Ordered    05/03/24 2034  insulin aspart U-100 pen 0-10 Units         -- SubQ Before meals & nightly PRN 05/03/24 1934          Hold Oral hypoglycemics while patient is in the hospital.      VTE Risk Mitigation (From admission, onward)           Ordered     enoxaparin injection 40 mg  Every 12 hours         05/03/24 1937     IP VTE HIGH RISK PATIENT  Once         05/03/24 " 1934     Place sequential compression device  Until discontinued         05/03/24 1934                               Pharmacist Renal Dose Adjustment Note    Jacoby Jean-Baptiste is a 49 y.o. male being treated with the medication Enoxaparin.    Patient Data:    Vital Signs (Most Recent):  Temp: 98.3 °F (36.8 °C) (05/03/24 1202)  Pulse: 78 (05/03/24 1902)  Resp: 18 (05/03/24 1542)  BP: (!) 121/56 (05/03/24 1902)  SpO2: 98 % (05/03/24 1902) Vital Signs (72h Range):  Temp:  [98.3 °F (36.8 °C)]   Pulse:  [78-87]   Resp:  [18]   BP: (121-151)/(56-98)   SpO2:  [98 %-100 %]      Recent Labs   Lab 05/03/24  1401   CREATININE 1.0     Serum creatinine: 1 mg/dL 05/03/24 1401  Estimated creatinine clearance: 111.6 mL/min    Enoxaparin 40 mg subq every 24 hours will be changed to Enoxaparin 40 mg subq every 12 hours due to BMI> 40.    Pharmacist's Name: Joanna Husain  Pharmacist's Extension: [phone]      Noe Juarez MD  Department of Hospital Medicine  [facility] - Emergency Dept

## 2024-05-03 NOTE — ED PROVIDER NOTES
Encounter Date: 5/3/2024       History     Chief Complaint   Patient presents with    Abdominal Pain    Diarrhea    Nausea     Hx of UC. States flare up x 1 wk with nausea and diarrhea and abd pain     Presents with complaint of generalized abdominal pain.  Onset 1 week.  Patient reports watery stools with blood.  He reports his pain at a 7/10.  He has a history of ulcerative colitis.  Denies fever.      Review of patient's allergies indicates:  No Known Allergies  Past Medical History:   Diagnosis Date    C. difficile colitis     Cavitary pneumonia 11/2023    pos aspergillus serology    Diabetes mellitus 01/2022    Hyperlipidemia 2015    Hypertension 2015    Insomnia 2015    Ulcerative colitis      Past Surgical History:   Procedure Laterality Date    BRONCHOSCOPY WITH FLUOROSCOPY Left 11/20/2023    Procedure: BRONCHOSCOPY, WITH FLUOROSCOPY;  Surgeon: Lisa Villarreal MD;  Location: [facility];  Service: Pulmonary;  Laterality: Left;    BRONCHOSCOPY WITH FLUOROSCOPY N/A 11/30/2023    Procedure: BRONCHOSCOPY, WITH FLUOROSCOPY;  Surgeon: Lisa Villarreal MD;  Location: [facility];  Service: Pulmonary;  Laterality: N/A;    CARDIAC ELECTROPHYSIOLOGY MAPPING AND ABLATION  2017    SVT    CHOLECYSTECTOMY  2011    COLONOSCOPY N/A 5/3/2022    Procedure: COLONOSCOPY;  Surgeon: Shan Jean III, MD;  Location: [facility];  Service: Endoscopy;  Laterality: N/A;    COLONOSCOPY N/A 3/16/2024    Procedure: COLONOSCOPY;  Surgeon: ALEKSANDER Ramos MD;  Location: [facility];  Service: Endoscopy;  Laterality: N/A;    COLONOSCOPY WITH FECAL MICROBIOTA TRANSFER N/A 3/21/2023    Procedure: COLONOSCOPY, WITH FECAL MICROBIOTA TRANSFER;  Surgeon: David Millan MD;  Location: [facility];  Service: Endoscopy;  Laterality: N/A;    ESOPHAGOGASTRODUODENOSCOPY N/A 4/24/2023    Procedure: EGD (ESOPHAGOGASTRODUODENOSCOPY);  Surgeon: Shan Jean III, MD;  Location: [facility];  Service: Endoscopy;  Laterality: N/A;    GANGLION  CYST EXCISION Left 1992    UMBILICAL HERNIA REPAIR  2011    with mesh     Family History   Problem Relation Name Age of Onset    Asthma Mother       Social History     Tobacco Use    Smoking status: Former     Current packs/day: 1.00     Average packs/day: 1 pack/day for 31.3 years (31.3 ttl pk-yrs)     Types: Cigarettes     Start date: 1993    Smokeless tobacco: Never    Tobacco comments:     Pt states he has stopped smoking with Chantix three weeks ago. 12/1/23   Substance Use Topics    Alcohol use: Yes     Comment: ocass    Drug use: Not Currently     Review of Systems   Constitutional:  Negative for fever.   Gastrointestinal:  Positive for abdominal pain, blood in stool and diarrhea. Negative for abdominal distention, constipation, nausea and vomiting.   Musculoskeletal:  Negative for back pain.   Neurological:  Negative for weakness.       Physical Exam     Initial Vitals [05/03/24 1202]   BP Pulse Resp Temp SpO2   (!) 151/81 87 18 98.3 °F (36.8 °C) 100 %      MAP       --         Physical Exam    Constitutional: He appears well-developed and well-nourished.   HENT:   Head: Normocephalic.   Mouth/Throat: Oropharynx is clear and moist.   Eyes: Conjunctivae are normal.   Neck: Neck supple.   Normal range of motion.  Cardiovascular:  Normal rate, regular rhythm and normal heart sounds.           Pulmonary/Chest: Breath sounds normal. No respiratory distress. He has no wheezes. He has no rhonchi.   Abdominal: He exhibits no distension. There is abdominal tenderness.   Generalized abdominal pain.  Abdomen is soft nondistended.  Bowel sounds are positive.  Patient is morbidly obese. There is no rebound and no guarding.   Musculoskeletal:         General: Normal range of motion.      Cervical back: Normal range of motion and neck supple.      Comments: Patient is ambulatory per self his gait is steady.     Neurological: He is alert. No sensory deficit. GCS score is 15. GCS eye subscore is 4. GCS verbal subscore is 5.  GCS motor subscore is 6.   Skin: Capillary refill takes less than 2 seconds.   Psychiatric: He has a normal mood and affect. Thought content normal.         ED Course   Procedures  Labs Reviewed   CBC W/ AUTO DIFFERENTIAL - Abnormal; Notable for the following components:       Result Value    WBC 16.20 (*)     RBC 3.52 (*)     Hemoglobin 10.9 (*)     Hematocrit 35.3 (*)      (*)     MCHC 30.9 (*)     RDW 17.9 (*)     Immature Granulocytes 1.0 (*)     Gran # (ANC) 11.6 (*)     Immature Grans (Abs) 0.17 (*)     Mono # 1.6 (*)     Eos # 0.8 (*)     Lymph % 12.3 (*)     All other components within normal limits   COMPREHENSIVE METABOLIC PANEL - Abnormal; Notable for the following components:    Calcium 8.6 (*)     Anion Gap 6 (*)     All other components within normal limits   LIPASE   URINALYSIS, REFLEX TO URINE CULTURE    Narrative:     Specimen Source->Urine   POCT CREATININE          Imaging Results              CT Abdomen Pelvis With IV Contrast NO Oral Contrast (Final result)  Result time 05/03/24 17:05:17      Final result by Rickie Brown MD (05/03/24 17:05:17)                   Impression:      Colonic wall thickening and pericolonic inflammatory stranding extending from the distal transverse colon through the descending colon, sigmoid colon, and rectum - compatible with acute colitis in this patient with history of ulcerative colitis.    No evidence of perforation or abscess.    Bilateral nonobstructing nephrolithiasis.    Small cluster of reticulonodular densities dependent right lower lobe, likely infectious/inflammatory nature.  Please correlate with pulmonary symptoms.      Electronically signed by: Rickie Brown  Date:    05/03/2024  Time:    17:05               Narrative:    EXAMINATION:  CT ABDOMEN PELVIS WITH IV CONTRAST    CLINICAL HISTORY:  Abdominal pain, acute, nonlocalized;History of ulcerative colitis;    TECHNIQUE:  CMS Mandated Quality Data-CT Radiation Dose-436    All CT scans at  this facility dose modulation, iterative reconstruction, and or weight-based dosing when appropriate to reduce radiation dose to as low as reasonably achievable.    COMPARISON:  CT abdomen and pelvis 03/14/2024    FINDINGS:  Left lung base is clear.  Small cluster of reticulonodular densities involving the dependent right lower lobe, new compared to prior. Bone window images show degenerative changes of the lumbar spine. No acute or aggressive osseous abnormality.    No focal hepatic lesion.  Gallbladder is surgically absent.  No intrahepatic or extrahepatic bile duct dilation.  Portal vein is patent.  Spleen is unremarkable.  Small left upper quadrant splenule at the hilar region.  Pancreas is unremarkable.  Duodenal diverticulum noted.  Next    No adrenal lesion.  Bilateral renal calculi including 3 mm calculus lower pole right kidney, 6 mm left lower pole renal calculus, with several additional 2-4 mm left renal calculi.  No hydronephrosis.  Ureters are normal in caliber.  No focal lesion of the urinary bladder.    Stomach is unremarkable.  No evidence of small-bowel obstruction.  Normal appendix.  Proximal colon is unremarkable.  Beginning at the mid/distal transverse colon, there is diffuse colonic wall thickening and mild pericolonic inflammatory stranding.  This extends throughout the descending colon, and is most pronounced in the region of the sigmoid colon, where the pericolonic stranding is moderate.  This extends distally through the rectal region.  No evidence of perforation.  No abscess/drainable fluid collection.    Aortoiliac atherosclerotic calcification.  No free fluid or free air within the abdomen or pelvis.  No pathologically enlarged lymph nodes.  Rim calcified nodule involving the anterior mesenteric fat is stable compared to prior, suggesting fat necrosis.                                       Medications   piperacillin-tazobactam 4.5 g in dextrose 5 % 100 mL IVPB (ready to mix) (has no  administration in time range)   sodium chloride 0.9% bolus 1,000 mL 1,000 mL (1,000 mLs Intravenous New Bag 5/3/24 1429)   ondansetron injection 4 mg (4 mg Intravenous Given 5/3/24 1429)   HYDROmorphone injection 1 mg (1 mg Intravenous Given 5/3/24 1542)   iohexoL (OMNIPAQUE 350) injection 100 mL (100 mLs Intravenous Given 5/3/24 1651)     Medical Decision Making  Presents with complaint of generalized abdominal pain.  Onset 1 week.  Patient has a history of ulcerative colitis.  He reports watery diarrhea with blood in his stools.  Some generalized nausea.  Rates the pain at a 7/10.    Amount and/or Complexity of Data Reviewed  Labs:      Details: White count 16.2.  Radiology: ordered.     Details: CT of the abdomen was compatible with acute colitis.  Discussion of management or test interpretation with external provider(s): Patient has been given a mg of Dilaudid and 4 mg Zofran IV.  CT report indicates compatibility with acute colitis.  He has been given Zosyn 4.5 mg IV here in the ED. he has been resting quietly after the dose of Dilaudid.  It appears to have controlled his pain.  He is asking for something to drink.  He was given water.  I have spoken to Dr. Juarez.  He agrees to admit patient.      Risk  Prescription drug management.                                      Clinical Impression:  Final diagnoses:  [K51.90] Ulcerative colitis          ED Disposition Condition    Observation                 Sarah Stevenson NP  05/03/24 3627

## 2024-05-03 NOTE — FIRST PROVIDER EVALUATION
Emergency Department TeleTriage Encounter Note      CHIEF COMPLAINT    Chief Complaint   Patient presents with    Abdominal Pain    Diarrhea    Nausea     Hx of UC. States flare up x 1 wk with nausea and diarrhea and abd pain       VITAL SIGNS   Initial Vitals [05/03/24 1202]   BP Pulse Resp Temp SpO2   (!) 151/81 87 18 98.3 °F (36.8 °C) 100 %      MAP       --            ALLERGIES    Review of patient's allergies indicates:  No Known Allergies    PROVIDER TRIAGE NOTE  This is a teletriage evaluation of a 49 y.o. male presenting to the ED complaining of abd pain with nausea and diarrhea for one week. Pmhx of UC. Has noted blood with diarrhea. No vomiting.  No anticoagulant use.     Initial orders will be placed and care will be transferred to an alternate provider when patient is roomed for a full evaluation. Any additional orders and the final disposition will be determined by that provider.         ORDERS  Labs Reviewed - No data to display    ED Orders (720h ago, onward)      Start Ordered     Status Ordering Provider    05/03/24 1230 05/03/24 1224  sodium chloride 0.9% bolus 1,000 mL 1,000 mL  Once         Ordered SELAM MCCOY N.    05/03/24 1230 05/03/24 1224  ondansetron injection 4 mg  ED 1 Time         Ordered SELAM MCCOY N.    05/03/24 1225 05/03/24 1224  Vital signs  Every 2 hours         Ordered SELAM MCCOY N.    05/03/24 1225 05/03/24 1224  CT Abdomen Pelvis  Without Contrast  1 time imaging         Ordered SELAM MCCOY N.    05/03/24 1224 05/03/24 1224  Diet NPO  Diet effective now         Ordered SELAM MCCOY N.    05/03/24 1224 05/03/24 1224  Insert peripheral IV  Once         Ordered SELAM MCCOY N.    05/03/24 1224 05/03/24 1224  CBC W/ AUTO DIFFERENTIAL  STAT         Ordered SELAM MCCOY N.    05/03/24 1224 05/03/24 1224  Comp. Metabolic Panel  STAT         Ordered SELAM MCCOY N.    05/03/24 1224 05/03/24 1224   Lipase  STAT         Ordered SELAM MCCOY N.    05/03/24 1224 05/03/24 1224  Urinalysis, Reflex to Urine Culture Urine, Clean Catch  STAT         Ordered SELAM MCCOY.              Virtual Visit Note: The provider triage portion of this emergency department evaluation and documentation was performed via [employer], a HIPAA-compliant telemedicine application, in concert with a tele-presenter in the room. A face to face patient evaluation with one of my colleagues will occur once the patient is placed in an emergency department room.      DISCLAIMER: This note was prepared with [employer] voice recognition transcription software. Garbled syntax, mangled pronouns, and other bizarre constructions may be attributed to that software system.

## 2024-05-04 LAB
ALBUMIN SERPL BCP-MCNC: 3.2 G/DL (ref 3.5–5.2)
ALP SERPL-CCNC: 63 U/L (ref 55–135)
ALT SERPL W/O P-5'-P-CCNC: 13 U/L (ref 10–44)
ANION GAP SERPL CALC-SCNC: 7 MMOL/L (ref 8–16)
AST SERPL-CCNC: 8 U/L (ref 10–40)
BASOPHILS # BLD AUTO: 0.07 K/UL (ref 0–0.2)
BASOPHILS NFR BLD: 0.6 % (ref 0–1.9)
BILIRUB SERPL-MCNC: 0.5 MG/DL (ref 0.1–1)
BUN SERPL-MCNC: 8 MG/DL (ref 6–20)
C DIFF GDH STL QL: NEGATIVE
C DIFF TOX A+B STL QL IA: NEGATIVE
CALCIUM SERPL-MCNC: 8.6 MG/DL (ref 8.7–10.5)
CHLORIDE SERPL-SCNC: 103 MMOL/L (ref 95–110)
CO2 SERPL-SCNC: 25 MMOL/L (ref 23–29)
CREAT SERPL-MCNC: 0.9 MG/DL (ref 0.5–1.4)
DIFFERENTIAL METHOD BLD: ABNORMAL
EOSINOPHIL # BLD AUTO: 0.6 K/UL (ref 0–0.5)
EOSINOPHIL NFR BLD: 5.3 % (ref 0–8)
ERYTHROCYTE [DISTWIDTH] IN BLOOD BY AUTOMATED COUNT: 17.4 % (ref 11.5–14.5)
EST. GFR  (NO RACE VARIABLE): >60 ML/MIN/1.73 M^2
GLUCOSE SERPL-MCNC: 127 MG/DL (ref 70–110)
GLUCOSE SERPL-MCNC: 144 MG/DL (ref 70–110)
GLUCOSE SERPL-MCNC: 162 MG/DL (ref 70–110)
GLUCOSE SERPL-MCNC: 86 MG/DL (ref 70–110)
HCT VFR BLD AUTO: 32.7 % (ref 40–54)
HGB BLD-MCNC: 10.2 G/DL (ref 14–18)
IMM GRANULOCYTES # BLD AUTO: 0.12 K/UL (ref 0–0.04)
IMM GRANULOCYTES NFR BLD AUTO: 1 % (ref 0–0.5)
LYMPHOCYTES # BLD AUTO: 1.6 K/UL (ref 1–4.8)
LYMPHOCYTES NFR BLD: 13.2 % (ref 18–48)
MAGNESIUM SERPL-MCNC: 1.5 MG/DL (ref 1.6–2.6)
MCH RBC QN AUTO: 31.4 PG (ref 27–31)
MCHC RBC AUTO-ENTMCNC: 31.2 G/DL (ref 32–36)
MCV RBC AUTO: 101 FL (ref 82–98)
MONOCYTES # BLD AUTO: 1.5 K/UL (ref 0.3–1)
MONOCYTES NFR BLD: 12.3 % (ref 4–15)
NEUTROPHILS # BLD AUTO: 8.1 K/UL (ref 1.8–7.7)
NEUTROPHILS NFR BLD: 67.6 % (ref 38–73)
NRBC BLD-RTO: 0 /100 WBC
PLATELET # BLD AUTO: 307 K/UL (ref 150–450)
PMV BLD AUTO: 9.3 FL (ref 9.2–12.9)
POTASSIUM SERPL-SCNC: 4.8 MMOL/L (ref 3.5–5.1)
PROT SERPL-MCNC: 6.4 G/DL (ref 6–8.4)
RBC # BLD AUTO: 3.25 M/UL (ref 4.6–6.2)
SODIUM SERPL-SCNC: 135 MMOL/L (ref 136–145)
WBC # BLD AUTO: 12.04 K/UL (ref 3.9–12.7)

## 2024-05-04 PROCEDURE — G0378 HOSPITAL OBSERVATION PER HR: HCPCS

## 2024-05-04 PROCEDURE — 36415 COLL VENOUS BLD VENIPUNCTURE: CPT | Performed by: INTERNAL MEDICINE

## 2024-05-04 PROCEDURE — 85025 COMPLETE CBC W/AUTO DIFF WBC: CPT | Performed by: INTERNAL MEDICINE

## 2024-05-04 PROCEDURE — 96372 THER/PROPH/DIAG INJ SC/IM: CPT | Performed by: INTERNAL MEDICINE

## 2024-05-04 PROCEDURE — 63600175 PHARM REV CODE 636 W HCPCS: Performed by: INTERNAL MEDICINE

## 2024-05-04 PROCEDURE — 83735 ASSAY OF MAGNESIUM: CPT | Performed by: INTERNAL MEDICINE

## 2024-05-04 PROCEDURE — 25000003 PHARM REV CODE 250

## 2024-05-04 PROCEDURE — 80053 COMPREHEN METABOLIC PANEL: CPT | Performed by: INTERNAL MEDICINE

## 2024-05-04 PROCEDURE — 25000003 PHARM REV CODE 250: Performed by: INTERNAL MEDICINE

## 2024-05-04 PROCEDURE — 63600175 PHARM REV CODE 636 W HCPCS

## 2024-05-04 PROCEDURE — 25000003 PHARM REV CODE 250: Performed by: STUDENT IN AN ORGANIZED HEALTH CARE EDUCATION/TRAINING PROGRAM

## 2024-05-04 RX ORDER — MAGNESIUM SULFATE HEPTAHYDRATE 40 MG/ML
2 INJECTION, SOLUTION INTRAVENOUS ONCE
Qty: 50 ML | Refills: 0 | Status: COMPLETED | OUTPATIENT
Start: 2024-05-04 | End: 2024-05-04

## 2024-05-04 RX ORDER — METHYLPREDNISOLONE SOD SUCC 125 MG
60 VIAL (EA) INJECTION DAILY
Status: DISCONTINUED | OUTPATIENT
Start: 2024-05-05 | End: 2024-05-09 | Stop reason: HOSPADM

## 2024-05-04 RX ORDER — OXYCODONE AND ACETAMINOPHEN 5; 325 MG/1; MG/1
1 TABLET ORAL EVERY 4 HOURS PRN
Status: DISCONTINUED | OUTPATIENT
Start: 2024-05-04 | End: 2024-05-09 | Stop reason: HOSPADM

## 2024-05-04 RX ORDER — DICYCLOMINE HYDROCHLORIDE 10 MG/1
20 CAPSULE ORAL
Status: DISCONTINUED | OUTPATIENT
Start: 2024-05-04 | End: 2024-05-09 | Stop reason: HOSPADM

## 2024-05-04 RX ORDER — SERTRALINE HYDROCHLORIDE 50 MG/1
100 TABLET, FILM COATED ORAL DAILY
Status: DISCONTINUED | OUTPATIENT
Start: 2024-05-05 | End: 2024-05-09 | Stop reason: HOSPADM

## 2024-05-04 RX ORDER — OXYCODONE AND ACETAMINOPHEN 10; 325 MG/1; MG/1
1 TABLET ORAL EVERY 4 HOURS PRN
Status: DISCONTINUED | OUTPATIENT
Start: 2024-05-04 | End: 2024-05-09 | Stop reason: HOSPADM

## 2024-05-04 RX ADMIN — ONDANSETRON 4 MG: 2 INJECTION INTRAMUSCULAR; INTRAVENOUS at 09:05

## 2024-05-04 RX ADMIN — METRONIDAZOLE 500 MG: 5 INJECTION, SOLUTION INTRAVENOUS at 08:05

## 2024-05-04 RX ADMIN — FAMOTIDINE 20 MG: 20 TABLET ORAL at 08:05

## 2024-05-04 RX ADMIN — ENOXAPARIN SODIUM 40 MG: 40 INJECTION SUBCUTANEOUS at 10:05

## 2024-05-04 RX ADMIN — OXYCODONE HYDROCHLORIDE AND ACETAMINOPHEN 1 TABLET: 10; 325 TABLET ORAL at 08:05

## 2024-05-04 RX ADMIN — ACETAMINOPHEN 650 MG: 325 TABLET ORAL at 10:05

## 2024-05-04 RX ADMIN — CHOLESTYRAMINE LIGHT 4 G: 4 POWDER, FOR SUSPENSION ORAL at 03:05

## 2024-05-04 RX ADMIN — METHYLPREDNISOLONE SODIUM SUCCINATE 60 MG: 40 INJECTION, POWDER, FOR SOLUTION INTRAMUSCULAR; INTRAVENOUS at 05:05

## 2024-05-04 RX ADMIN — METOPROLOL SUCCINATE 50 MG: 50 TABLET, FILM COATED, EXTENDED RELEASE ORAL at 09:05

## 2024-05-04 RX ADMIN — OXYCODONE HYDROCHLORIDE AND ACETAMINOPHEN 1 TABLET: 10; 325 TABLET ORAL at 01:05

## 2024-05-04 RX ADMIN — ENOXAPARIN SODIUM 40 MG: 40 INJECTION SUBCUTANEOUS at 08:05

## 2024-05-04 RX ADMIN — FAMOTIDINE 20 MG: 20 TABLET ORAL at 09:05

## 2024-05-04 RX ADMIN — CHOLESTYRAMINE LIGHT 4 G: 4 POWDER, FOR SUSPENSION ORAL at 09:05

## 2024-05-04 RX ADMIN — METRONIDAZOLE 500 MG: 5 INJECTION, SOLUTION INTRAVENOUS at 01:05

## 2024-05-04 RX ADMIN — ZOLPIDEM TARTRATE 10 MG: 5 TABLET, COATED ORAL at 08:05

## 2024-05-04 RX ADMIN — MAGNESIUM SULFATE HEPTAHYDRATE 2 G: 40 INJECTION, SOLUTION INTRAVENOUS at 09:05

## 2024-05-04 RX ADMIN — HYDROCODONE BITARTRATE AND ACETAMINOPHEN 1 TABLET: 5; 325 TABLET ORAL at 04:05

## 2024-05-04 RX ADMIN — DICYCLOMINE HYDROCHLORIDE 20 MG: 10 CAPSULE ORAL at 08:05

## 2024-05-04 RX ADMIN — BUSPIRONE HYDROCHLORIDE 7.5 MG: 5 TABLET ORAL at 03:05

## 2024-05-04 RX ADMIN — LEVOFLOXACIN 500 MG: 5 INJECTION, SOLUTION INTRAVENOUS at 08:05

## 2024-05-04 RX ADMIN — METRONIDAZOLE 500 MG: 5 INJECTION, SOLUTION INTRAVENOUS at 04:05

## 2024-05-04 RX ADMIN — DICYCLOMINE HYDROCHLORIDE 20 MG: 10 CAPSULE ORAL at 04:05

## 2024-05-04 RX ADMIN — CHOLESTYRAMINE LIGHT 4 G: 4 POWDER, FOR SUSPENSION ORAL at 08:05

## 2024-05-04 RX ADMIN — BUSPIRONE HYDROCHLORIDE 7.5 MG: 5 TABLET ORAL at 09:05

## 2024-05-04 RX ADMIN — BUSPIRONE HYDROCHLORIDE 7.5 MG: 5 TABLET ORAL at 08:05

## 2024-05-04 RX ADMIN — METHYLPREDNISOLONE SODIUM SUCCINATE 60 MG: 40 INJECTION, POWDER, FOR SOLUTION INTRAMUSCULAR; INTRAVENOUS at 01:05

## 2024-05-04 NOTE — SUBJECTIVE & OBJECTIVE
Interval History:  Patient seen and examined.  Overnight notes reviewed.  Patient reports some improvement in abdominal pain.  He denies chest pain or shortness a breath.  C stool study was negative.  GI consulted.  Solu-Medrol ordered.    Review of Systems   Respiratory:  Negative for shortness of breath.    Cardiovascular:  Negative for chest pain and palpitations.   Gastrointestinal:  Positive for abdominal pain and diarrhea.     Objective:     Vital Signs (Most Recent):  Temp: 98.2 °F (36.8 °C) (05/04/24 1121)  Pulse: 85 (05/04/24 1121)  Resp: 20 (05/04/24 1315)  BP: 133/80 (05/04/24 1121)  SpO2: (!) 94 % (05/04/24 1121) Vital Signs (24h Range):  Temp:  [98 °F (36.7 °C)-98.2 °F (36.8 °C)] 98.2 °F (36.8 °C)  Pulse:  [78-85] 85  Resp:  [17-20] 20  SpO2:  [94 %-98 %] 94 %  BP: (120-133)/(56-98) 133/80     Weight: 131.1 kg (289 lb 0.4 oz)  Body mass index is 48.1 kg/m².    Intake/Output Summary (Last 24 hours) at 5/4/2024 1514  Last data filed at 5/4/2024 1122  Gross per 24 hour   Intake 0 ml   Output 300 ml   Net -300 ml         Physical Exam  Vitals and nursing note reviewed.   Constitutional:       General: He is not in acute distress.     Appearance: Normal appearance. He is obese. He is ill-appearing (chronically).   HENT:      Head: Normocephalic and atraumatic.      Mouth/Throat:      Mouth: Mucous membranes are moist.      Pharynx: Oropharynx is clear.   Eyes:      Extraocular Movements: Extraocular movements intact.      Pupils: Pupils are equal, round, and reactive to light.   Cardiovascular:      Rate and Rhythm: Normal rate and regular rhythm.      Pulses: Normal pulses.      Heart sounds: Normal heart sounds.   Pulmonary:      Effort: Pulmonary effort is normal.      Breath sounds: Normal breath sounds.   Abdominal:      General: Abdomen is flat. Bowel sounds are normal.      Palpations: Abdomen is soft.      Tenderness: There is abdominal tenderness. There is no guarding or rebound.   Musculoskeletal:       Cervical back: Normal range of motion and neck supple.   Skin:     General: Skin is warm.      Capillary Refill: Capillary refill takes less than 2 seconds.   Neurological:      General: No focal deficit present.      Mental Status: He is alert and oriented to person, place, and time. Mental status is at baseline.   Psychiatric:         Mood and Affect: Mood normal.         Behavior: Behavior normal.             Significant Labs: All pertinent labs within the past 24 hours have been reviewed.  CBC:   Recent Labs   Lab 05/03/24  1401 05/04/24  0525   WBC 16.20* 12.04   HGB 10.9* 10.2*   HCT 35.3* 32.7*    307     CMP:   Recent Labs   Lab 05/03/24  1401 05/04/24  0525    135*   K 4.1 4.8    103   CO2 27 25   GLU 86 86   BUN 11 8   CREATININE 1.0 0.9   CALCIUM 8.6* 8.6*   PROT 7.0 6.4   ALBUMIN 3.6 3.2*   BILITOT 0.5 0.5   ALKPHOS 67 63   AST 10 8*   ALT 15 13   ANIONGAP 6* 7*       Significant Imaging: I have reviewed all pertinent imaging results/findings within the past 24 hours.

## 2024-05-04 NOTE — ASSESSMENT & PLAN NOTE
Getting admitted with U colitis flare  Start iv steroids/iv abx   Obtain  stool to r/o C diff   Maintain cholestyramine PO  Stop Metformin/sertraline in view with loose stools  Stop PPI in view with C diff H/o

## 2024-05-04 NOTE — ASSESSMENT & PLAN NOTE
Body mass index is 48.1 kg/m². Morbid obesity complicates all aspects of disease management from diagnostic modalities to treatment.

## 2024-05-04 NOTE — PROGRESS NOTES
[facility] Medicine  Progress Note    Patient Name: Jacoby Jean-Baptiste  MRN: [id number]  Patient Class: OP- Observation   Admission Date: 5/3/2024  Length of Stay: 0 days  Attending Physician: Butch Rodriguez MD  Primary Care Provider: Hunter Aguilar III, MD        Subjective:     Principal Problem:Ulcerative colitis        HPI:  49 year old pt getting admitted with ulcerative colitis flare  Pt was admitted with same c/o this year(Feb/March)  Per Pt his last intake of steroids PO was few days ago  He was doing fine and started having profuse loose stools again  Noticed blood and pus in stools  Stools were not smelly as per pt(Has H/o C diff)  Symptoms got worse and pt came to hospital and got admitted   His last colonoscopy was in March this year     Overview/Hospital Course:  No notes on file    Interval History:  Patient seen and examined.  Overnight notes reviewed.  Patient reports some improvement in abdominal pain.  He denies chest pain or shortness a breath.  C stool study was negative.  GI consulted.  Solu-Medrol ordered.    Review of Systems   Respiratory:  Negative for shortness of breath.    Cardiovascular:  Negative for chest pain and palpitations.   Gastrointestinal:  Positive for abdominal pain and diarrhea.     Objective:     Vital Signs (Most Recent):  Temp: 98.2 °F (36.8 °C) (05/04/24 1121)  Pulse: 85 (05/04/24 1121)  Resp: 20 (05/04/24 1315)  BP: 133/80 (05/04/24 1121)  SpO2: (!) 94 % (05/04/24 1121) Vital Signs (24h Range):  Temp:  [98 °F (36.7 °C)-98.2 °F (36.8 °C)] 98.2 °F (36.8 °C)  Pulse:  [78-85] 85  Resp:  [17-20] 20  SpO2:  [94 %-98 %] 94 %  BP: (120-133)/(56-98) 133/80     Weight: 131.1 kg (289 lb 0.4 oz)  Body mass index is 48.1 kg/m².    Intake/Output Summary (Last 24 hours) at 5/4/2024 1514  Last data filed at 5/4/2024 1122  Gross per 24 hour   Intake 0 ml   Output 300 ml   Net -300 ml         Physical Exam  Vitals and nursing note reviewed.   Constitutional:        General: He is not in acute distress.     Appearance: Normal appearance. He is obese. He is ill-appearing (chronically).   HENT:      Head: Normocephalic and atraumatic.      Mouth/Throat:      Mouth: Mucous membranes are moist.      Pharynx: Oropharynx is clear.   Eyes:      Extraocular Movements: Extraocular movements intact.      Pupils: Pupils are equal, round, and reactive to light.   Cardiovascular:      Rate and Rhythm: Normal rate and regular rhythm.      Pulses: Normal pulses.      Heart sounds: Normal heart sounds.   Pulmonary:      Effort: Pulmonary effort is normal.      Breath sounds: Normal breath sounds.   Abdominal:      General: Abdomen is flat. Bowel sounds are normal.      Palpations: Abdomen is soft.      Tenderness: There is abdominal tenderness. There is no guarding or rebound.   Musculoskeletal:      Cervical back: Normal range of motion and neck supple.   Skin:     General: Skin is warm.      Capillary Refill: Capillary refill takes less than 2 seconds.   Neurological:      General: No focal deficit present.      Mental Status: He is alert and oriented to person, place, and time. Mental status is at baseline.   Psychiatric:         Mood and Affect: Mood normal.         Behavior: Behavior normal.             Significant Labs: All pertinent labs within the past 24 hours have been reviewed.  CBC:   Recent Labs   Lab 05/03/24  1401 05/04/24  0525   WBC 16.20* 12.04   HGB 10.9* 10.2*   HCT 35.3* 32.7*    307     CMP:   Recent Labs   Lab 05/03/24  1401 05/04/24  0525    135*   K 4.1 4.8    103   CO2 27 25   GLU 86 86   BUN 11 8   CREATININE 1.0 0.9   CALCIUM 8.6* 8.6*   PROT 7.0 6.4   ALBUMIN 3.6 3.2*   BILITOT 0.5 0.5   ALKPHOS 67 63   AST 10 8*   ALT 15 13   ANIONGAP 6* 7*       Significant Imaging: I have reviewed all pertinent imaging results/findings within the past 24 hours.    Assessment/Plan:      * Ulcerative colitis  Acute:  U colitis flare  Start iv steroids/iv  "abx   Obtain  stool to r/o C diff   Maintain cholestyramine PO  Stop Metformin/sertraline in view with loose stools  Stop PPI in view with C diff H/o   GI consulted:  Appreciate recommendations      Long term systemic steroid user  Last PO Budesonide intake was few weeks ago      Atrial fibrillation, unspecified type  Per chart review    PUD (peptic ulcer disease)  Maintain pepcid      History of Clostridioides difficile colitis  Aware   C diff stool study negative      BMI 45.0-49.9, adult  Body mass index is 48.1 kg/m². Morbid obesity complicates all aspects of disease management from diagnostic modalities to treatment.     Type 2 diabetes mellitus without complication, without long-term current use of insulin  Patient's FSGs are controlled on current medication regimen.  Last A1c reviewed-   Lab Results   Component Value Date    HGBA1C 5.7 03/16/2024     Most recent fingerstick glucose reviewed- No results for input(s): "POCTGLUCOSE" in the last 24 hours.  Current correctional scale  Medium  Maintain anti-hyperglycemic dose as follows-   Antihyperglycemics (From admission, onward)      Start     Stop Route Frequency Ordered    05/03/24 2034  insulin aspart U-100 pen 0-10 Units         -- SubQ Before meals & nightly PRN 05/03/24 1934          Hold Oral hypoglycemics while patient is in the hospital.      VTE Risk Mitigation (From admission, onward)           Ordered     enoxaparin injection 40 mg  Every 12 hours         05/03/24 1937     IP VTE HIGH RISK PATIENT  Once         05/03/24 1934     Place sequential compression device  Until discontinued         05/03/24 1934                    Discharge Planning   GERARDO: 5/5/2024     Code Status: Full Code   Is the patient medically ready for discharge?:     Reason for patient still in hospital (select all that apply): Patient trending condition, Laboratory test, Treatment, Consult recommendations, and Pending disposition  Discharge Plan A: Home                  Gely KIM " ELMIRA Mccoy  Department of Hospital Medicine   [facility]

## 2024-05-04 NOTE — ASSESSMENT & PLAN NOTE
Acute:  U colitis flare  Start iv steroids/iv abx   Obtain  stool to r/o C diff   Maintain cholestyramine PO  Stop Metformin/sertraline in view with loose stools  Stop PPI in view with C diff H/o   GI consulted:  Appreciate recommendations

## 2024-05-04 NOTE — SUBJECTIVE & OBJECTIVE
Past Medical History:   Diagnosis Date    C. difficile colitis     Cavitary pneumonia 11/2023    pos aspergillus serology    Diabetes mellitus 01/2022    Hyperlipidemia 2015    Hypertension 2015    Insomnia 2015    Ulcerative colitis        Past Surgical History:   Procedure Laterality Date    BRONCHOSCOPY WITH FLUOROSCOPY Left 11/20/2023    Procedure: BRONCHOSCOPY, WITH FLUOROSCOPY;  Surgeon: Lisa Villarreal MD;  Location: [facility];  Service: Pulmonary;  Laterality: Left;    BRONCHOSCOPY WITH FLUOROSCOPY N/A 11/30/2023    Procedure: BRONCHOSCOPY, WITH FLUOROSCOPY;  Surgeon: Lisa Villarreal MD;  Location: [facility] ENDO;  Service: Pulmonary;  Laterality: N/A;    CARDIAC ELECTROPHYSIOLOGY MAPPING AND ABLATION  2017    SVT    CHOLECYSTECTOMY  2011    COLONOSCOPY N/A 5/3/2022    Procedure: COLONOSCOPY;  Surgeon: Shan Jean III, MD;  Location: [facility];  Service: Endoscopy;  Laterality: N/A;    COLONOSCOPY N/A 3/16/2024    Procedure: COLONOSCOPY;  Surgeon: ALEKSANDER Ramos MD;  Location: [facility];  Service: Endoscopy;  Laterality: N/A;    COLONOSCOPY WITH FECAL MICROBIOTA TRANSFER N/A 3/21/2023    Procedure: COLONOSCOPY, WITH FECAL MICROBIOTA TRANSFER;  Surgeon: David Millan MD;  Location: [facility];  Service: Endoscopy;  Laterality: N/A;    ESOPHAGOGASTRODUODENOSCOPY N/A 4/24/2023    Procedure: EGD (ESOPHAGOGASTRODUODENOSCOPY);  Surgeon: Shan Jean III, MD;  Location: [facility];  Service: Endoscopy;  Laterality: N/A;    GANGLION CYST EXCISION Left 1992    UMBILICAL HERNIA REPAIR  2011    with mesh       Review of patient's allergies indicates:  No Known Allergies    Current Facility-Administered Medications   Medication Dose Route Frequency Provider Last Rate Last Admin    0.9%  NaCl infusion   Intravenous Continuous Noe Juarez MD        acetaminophen tablet 650 mg  650 mg Oral Q8H PRN Noe Juarez MD        acetaminophen tablet 650 mg  650 mg Oral Q4H PRN Noe Juarez MD         aluminum-magnesium hydroxide-simethicone 200-200-20 mg/5 mL suspension 30 mL  30 mL Oral QID PRN Noe Juarez MD        busPIRone split tablet 7.5 mg  7.5 mg Oral TID Noe Juarez MD        cholestyramine-aspartame 4 gram packet 4 g  1 packet Oral TID Noe Juarez MD        dextrose 50% injection 12.5 g  12.5 g Intravenous PRN Noe Juarez MD        dextrose 50% injection 25 g  25 g Intravenous PRN Noe Juarez MD        enoxaparin injection 40 mg  40 mg Subcutaneous Q12H (prophylaxis, 0900/2100) Noe Juarez MD        famotidine tablet 20 mg  20 mg Oral BID Noe Juarez MD        glucagon (human recombinant) injection 1 mg  1 mg Intramuscular PRN Noe Juarez MD        glucose chewable tablet 16 g  16 g Oral PRN Noe Juarez MD        glucose chewable tablet 24 g  24 g Oral PRN Noe Juarez MD        HYDROcodone-acetaminophen 5-325 mg per tablet 1 tablet  1 tablet Oral Q6H PRN Noe Juarez MD        insulin aspart U-100 pen 0-10 Units  0-10 Units Subcutaneous QID (AC + HS) PRN Noe Juarez MD        levoFLOXacin 500 mg/100 mL IVPB 500 mg  500 mg Intravenous Q24H Noe Juarez MD        LORazepam tablet 0.5 mg  0.5 mg Oral Q6H PRN Noe Juarez MD        magnesium oxide tablet 800 mg  800 mg Oral PRN Noe Juarez MD        magnesium oxide tablet 800 mg  800 mg Oral PRN Noe Juarez MD        melatonin tablet 6 mg  6 mg Oral Nightly PRN Noe Juarez MD        methylPREDNISolone sodium succinate injection 60 mg  60 mg Intravenous Q8H Noe Juarez MD        [START ON 5/4/2024] metoprolol succinate (TOPROL-XL) 24 hr tablet 50 mg  50 mg Oral Daily Noe Juarez MD        metronidazole IVPB 500 mg  500 mg Intravenous Q8H Noe Juarez MD        ondansetron injection 4 mg  4 mg Intravenous Q6H PRN Noe Juarez MD        potassium bicarbonate disintegrating tablet 35 mEq  35 mEq Oral PRN Noe Juarez MD        potassium bicarbonate disintegrating tablet 50 mEq  50 mEq Oral PRN Noe Juarez MD        potassium bicarbonate disintegrating tablet 60 mEq   60 mEq Oral PRN Noe Juarez MD        potassium, sodium phosphates 280-160-250 mg packet 2 packet  2 packet Oral PRNoe Dawkins MD        potassium, sodium phosphates 280-160-250 mg packet 2 packet  2 packet Oral PRNoe Dawkins MD        potassium, sodium phosphates 280-160-250 mg packet 2 packet  2 packet Oral PRN Noe Juarez MD        zolpidem tablet 10 mg  10 mg Oral Nightly PRN Noe Juarez MD         Current Outpatient Medications   Medication Sig Dispense Refill    busPIRone (BUSPAR) 7.5 MG tablet Take 1 tablet (7.5 mg total) by mouth 3 (three) times daily. 270 tablet 1    cholestyramine (QUESTRAN) 4 gram packet Take 1 packet by mouth 3 (three) times daily.      dicyclomine (BENTYL) 10 MG capsule Take 10 mg by mouth 3 (three) times daily as needed.      ergocalciferol (VITAMIN D2) 50,000 unit Cap Take 1 capsule (50,000 Units total) by mouth every 7 days. 12 capsule 1    LORazepam (ATIVAN) 0.5 MG tablet Take 1 tablet (0.5 mg total) by mouth every 6 (six) hours as needed for Anxiety. 60 tablet 2    metFORMIN (GLUCOPHAGE-XR) 500 MG ER 24hr tablet Take 1 tablet (500 mg total) by mouth 2 (two) times daily with meals. 180 tablet 1    metoprolol succinate (TOPROL-XL) 50 MG 24 hr tablet Take 1 tablet (50 mg total) by mouth once daily. 90 tablet 1    pantoprazole (PROTONIX) 40 MG tablet Take 1 tablet (40 mg total) by mouth 2 (two) times daily. 180 tablet 3    sertraline (ZOLOFT) 100 MG tablet Take 1 tablet (100 mg total) by mouth once daily. 90 tablet 1    simvastatin (ZOCOR) 20 MG tablet Take 20 mg by mouth every evening.      varenicline (CHANTIX) 1 mg Tab Take 1 tablet (1 mg total) by mouth 2 (two) times daily. 180 tablet 1    zolpidem (AMBIEN) 10 mg Tab Take 1 tablet (10 mg total) by mouth nightly as needed (insomnia). 30 tablet 2    promethazine (PHENERGAN) 25 MG tablet Take 25 mg by mouth 4 (four) times daily as needed for Nausea.      semaglutide (OZEMPIC) 1 mg/dose (4 mg/3 mL) Inject 1 mg into the skin every 7  days. SATURDAYS 3 each 1     Facility-Administered Medications Ordered in Other Encounters   Medication Dose Route Frequency Provider Last Rate Last Admin    lactated ringers infusion   Intravenous Continuous David Millan MD 75 mL/hr at 03/21/23 1252 New Bag at 03/21/23 1252     Family History       Problem Relation (Age of Onset)    Asthma Mother          Tobacco Use    Smoking status: Former     Current packs/day: 1.00     Average packs/day: 1 pack/day for 31.3 years (31.3 ttl pk-yrs)     Types: Cigarettes     Start date: 1993    Smokeless tobacco: Never    Tobacco comments:     Pt states he has stopped smoking with Chantix three weeks ago. 12/1/23   Substance and Sexual Activity    Alcohol use: Yes     Comment: ocass    Drug use: Not Currently    Sexual activity: Not Currently     Review of Systems   Constitutional:  Negative for activity change and appetite change.   HENT:  Negative for congestion and dental problem.    Eyes:  Negative for discharge and itching.   Respiratory:  Negative for shortness of breath.    Cardiovascular:  Negative for chest pain.   Gastrointestinal:  Positive for blood in stool and diarrhea. Negative for abdominal distention and abdominal pain.   Endocrine: Negative for cold intolerance.   Genitourinary:  Negative for difficulty urinating and dysuria.   Musculoskeletal:  Negative for arthralgias and back pain.   Skin:  Negative for color change.   Neurological:  Negative for dizziness and facial asymmetry.   Hematological:  Negative for adenopathy.   Psychiatric/Behavioral:  Negative for agitation and behavioral problems.      Objective:     Vital Signs (Most Recent):  Temp: 98.3 °F (36.8 °C) (05/03/24 1202)  Pulse: 78 (05/03/24 1902)  Resp: 18 (05/03/24 1542)  BP: (!) 121/56 (05/03/24 1902)  SpO2: 98 % (05/03/24 1902) Vital Signs (24h Range):  Temp:  [98.3 °F (36.8 °C)] 98.3 °F (36.8 °C)  Pulse:  [78-87] 78  Resp:  [18] 18  SpO2:  [98 %-100 %] 98 %  BP: (121-151)/(56-98) 121/56      Weight: 131.1 kg (289 lb)  Body mass index is 48.09 kg/m².     Physical Exam  Vitals and nursing note reviewed.   Constitutional:       Appearance: He is well-developed.   HENT:      Head: Atraumatic.      Right Ear: External ear normal.      Left Ear: External ear normal.      Nose: Nose normal.      Mouth/Throat:      Mouth: Mucous membranes are moist.   Cardiovascular:      Rate and Rhythm: Normal rate.   Pulmonary:      Effort: Pulmonary effort is normal.   Musculoskeletal:         General: Normal range of motion.      Cervical back: Full passive range of motion without pain and normal range of motion.   Skin:     General: Skin is dry.   Neurological:      Mental Status: He is alert and oriented to person, place, and time.   Psychiatric:         Behavior: Behavior normal.                Significant Labs: All pertinent labs within the past 24 hours have been reviewed.  CBC:   Recent Labs   Lab 05/03/24  1401   WBC 16.20*   HGB 10.9*   HCT 35.3*        CMP:   Recent Labs   Lab 05/03/24  1401      K 4.1      CO2 27   GLU 86   BUN 11   CREATININE 1.0   CALCIUM 8.6*   PROT 7.0   ALBUMIN 3.6   BILITOT 0.5   ALKPHOS 67   AST 10   ALT 15   ANIONGAP 6*       Significant Imaging: I have reviewed all pertinent imaging results/findings within the past 24 hours.

## 2024-05-04 NOTE — ASSESSMENT & PLAN NOTE
"Patient's FSGs are controlled on current medication regimen.  Last A1c reviewed-   Lab Results   Component Value Date    HGBA1C 5.7 03/16/2024     Most recent fingerstick glucose reviewed- No results for input(s): "POCTGLUCOSE" in the last 24 hours.  Current correctional scale  Medium  Maintain anti-hyperglycemic dose as follows-   Antihyperglycemics (From admission, onward)    Start     Stop Route Frequency Ordered    05/03/24 2034  insulin aspart U-100 pen 0-10 Units         -- SubQ Before meals & nightly PRN 05/03/24 1934        Hold Oral hypoglycemics while patient is in the hospital.  "

## 2024-05-04 NOTE — CONSULTS
GASTROENTEROLOGY INPATIENT CONSULT NOTE  Patient Name: Jacoby Jean-Baptiste  Patient MRN: [id number]  Patient : 1975    Admit Date: 5/3/2024  Service date: 2024    Reason for Consult: UC Flare    PCP: Hunter Aguilar III, MD    Chief Complaint   Patient presents with    Abdominal Pain    Diarrhea    Nausea     Hx of UC. States flare up x 1 wk with nausea and diarrhea and abd pain       HPI: Patient is a 49 y.o. male with PMHx Ulcerative Colitis, HTN, DM2, h/o C diff infection who presents with abdominal pain and diarrhea. CT consistent with L sided UC. Recently started on Entyvio after Remicade failure but then changed to Omvoh recently with his first loading infusion on 24. Having 10-15 BMs/day with blood and pus. Doesn't have C diff odor. Just finished 40 mg prednisone course a few days ago. Had been on for 10 days prior with no taper. Abdominal pain diffuse. Diarrhea and pain worse when eating.    CHART REVIEW:   Colonoscopy 3/'24: Devine 3 L sided disease  Hospitalization ; CT  - fatty liver; L sided colitis; Calpro back up ot 3070 (201 on remicade); Neg C. diff  Entyvio started   Hospitalization  - cavitary fungal lesions  Labs 10/'23 - C.diff negative after vanco and rebyota enemas.  Labs  -  Calprotectin 201 on remicade w/ C. diff...tx w/ vanco w/ long taper +/- rebyota; Nml CRP much better on remicade  EGD  - candidate esophagitis / duodenitis; HP neg gastirits  Hospitalization  - colitis on CT; C. diff neg; Remicade loaded  Colon 3/'23 - Pan-colitis; Nml TI; s/p FMT for refractory C. diff  C. DIff + 3/'23  Labs  - CRP 21; Stool PCR + C. DIff....  CT  - Left sided colitis s/p karina  Colon  - Nml R colon bx; Chronic sigmoid colitis / proctitis....Suspect ulcerative proctitis / distal colitis     Past Medical History:  Past Medical History:   Diagnosis Date    C. difficile colitis     Cavitary pneumonia 2023    pos aspergillus serology    Diabetes  mellitus 01/2022    Hyperlipidemia 2015    Hypertension 2015    Insomnia 2015    Ulcerative colitis         Past Surgical History:  Past Surgical History:   Procedure Laterality Date    BRONCHOSCOPY WITH FLUOROSCOPY Left 11/20/2023    Procedure: BRONCHOSCOPY, WITH FLUOROSCOPY;  Surgeon: Lisa Villarreal MD;  Location: [facility];  Service: Pulmonary;  Laterality: Left;    BRONCHOSCOPY WITH FLUOROSCOPY N/A 11/30/2023    Procedure: BRONCHOSCOPY, WITH FLUOROSCOPY;  Surgeon: Lisa Villarreal MD;  Location: [facility] ENDO;  Service: Pulmonary;  Laterality: N/A;    CARDIAC ELECTROPHYSIOLOGY MAPPING AND ABLATION  2017    SVT    CHOLECYSTECTOMY  2011    COLONOSCOPY N/A 5/3/2022    Procedure: COLONOSCOPY;  Surgeon: Shan Jean III, MD;  Location: [facility] ENDO;  Service: Endoscopy;  Laterality: N/A;    COLONOSCOPY N/A 3/16/2024    Procedure: COLONOSCOPY;  Surgeon: ALEKSANDER Ramos MD;  Location: [facility];  Service: Endoscopy;  Laterality: N/A;    COLONOSCOPY WITH FECAL MICROBIOTA TRANSFER N/A 3/21/2023    Procedure: COLONOSCOPY, WITH FECAL MICROBIOTA TRANSFER;  Surgeon: David Millan MD;  Location: [facility];  Service: Endoscopy;  Laterality: N/A;    ESOPHAGOGASTRODUODENOSCOPY N/A 4/24/2023    Procedure: EGD (ESOPHAGOGASTRODUODENOSCOPY);  Surgeon: Shan Jean III, MD;  Location: [facility];  Service: Endoscopy;  Laterality: N/A;    GANGLION CYST EXCISION Left 1992    UMBILICAL HERNIA REPAIR  2011    with mesh        Home Medications:  Medications Prior to Admission   Medication Sig Dispense Refill Last Dose    busPIRone (BUSPAR) 7.5 MG tablet Take 1 tablet (7.5 mg total) by mouth 3 (three) times daily. 270 tablet 1 5/3/2024 at 08:00    cholestyramine (QUESTRAN) 4 gram packet Take 1 packet by mouth 3 (three) times daily.   5/3/2024 at 08:00    dicyclomine (BENTYL) 10 MG capsule Take 10 mg by mouth 3 (three) times daily as needed.   5/3/2024    ergocalciferol (VITAMIN D2) 50,000 unit Cap Take 1 capsule  (50,000 Units total) by mouth every 7 days. 12 capsule 1 5/3/2024    LORazepam (ATIVAN) 0.5 MG tablet Take 1 tablet (0.5 mg total) by mouth every 6 (six) hours as needed for Anxiety. 60 tablet 2 5/3/2024    metFORMIN (GLUCOPHAGE-XR) 500 MG ER 24hr tablet Take 1 tablet (500 mg total) by mouth 2 (two) times daily with meals. 180 tablet 1 5/3/2024    metoprolol succinate (TOPROL-XL) 50 MG 24 hr tablet Take 1 tablet (50 mg total) by mouth once daily. 90 tablet 1 5/3/2024    pantoprazole (PROTONIX) 40 MG tablet Take 1 tablet (40 mg total) by mouth 2 (two) times daily. 180 tablet 3 5/3/2024    sertraline (ZOLOFT) 100 MG tablet Take 1 tablet (100 mg total) by mouth once daily. 90 tablet 1 5/3/2024    simvastatin (ZOCOR) 20 MG tablet Take 20 mg by mouth every evening.   5/2/2024    varenicline (CHANTIX) 1 mg Tab Take 1 tablet (1 mg total) by mouth 2 (two) times daily. 180 tablet 1 5/3/2024    zolpidem (AMBIEN) 10 mg Tab Take 1 tablet (10 mg total) by mouth nightly as needed (insomnia). 30 tablet 2 5/2/2024    promethazine (PHENERGAN) 25 MG tablet Take 25 mg by mouth 4 (four) times daily as needed for Nausea.       semaglutide (OZEMPIC) 1 mg/dose (4 mg/3 mL) Inject 1 mg into the skin every 7 days. SATURDAYS 3 each 1 4/27/2024       Inpatient Medications:  Current Facility-Administered Medications   Medication Dose Route Frequency    busPIRone  7.5 mg Oral TID    cholestyramine-aspartame  1 packet Oral TID    enoxparin  40 mg Subcutaneous Q12H (prophylaxis, 0900/2100)    famotidine  20 mg Oral BID    levoFLOXacin  500 mg Intravenous Q24H    methylPREDNISolone sodium succinate injection  60 mg Intravenous Q8H    metoprolol succinate  50 mg Oral Daily    metronidazole  500 mg Intravenous Q8H    [START ON 5/5/2024] sertraline  100 mg Oral Daily       Current Facility-Administered Medications:     acetaminophen, 650 mg, Oral, Q8H PRN    acetaminophen, 650 mg, Oral, Q4H PRN    aluminum-magnesium hydroxide-simethicone, 30 mL, Oral,  QID PRN    dextrose 50%, 12.5 g, Intravenous, PRN    dextrose 50%, 25 g, Intravenous, PRN    glucagon (human recombinant), 1 mg, Intramuscular, PRN    glucose, 16 g, Oral, PRN    glucose, 24 g, Oral, PRN    HYDROcodone-acetaminophen, 1 tablet, Oral, Q6H PRN    insulin aspart U-100, 0-10 Units, Subcutaneous, QID (AC + HS) PRN    LORazepam, 0.5 mg, Oral, Q6H PRN    magnesium oxide, 800 mg, Oral, PRN    magnesium oxide, 800 mg, Oral, PRN    melatonin, 6 mg, Oral, Nightly PRN    ondansetron, 4 mg, Intravenous, Q6H PRN    potassium bicarbonate, 35 mEq, Oral, PRN    potassium bicarbonate, 50 mEq, Oral, PRN    potassium bicarbonate, 60 mEq, Oral, PRN    potassium, sodium phosphates, 2 packet, Oral, PRN    potassium, sodium phosphates, 2 packet, Oral, PRN    potassium, sodium phosphates, 2 packet, Oral, PRN    zolpidem, 10 mg, Oral, Nightly PRN    Review of patient's allergies indicates:  No Known Allergies    Social History:   Social History     Occupational History    Not on file   Tobacco Use    Smoking status: Former     Current packs/day: 1.00     Average packs/day: 1 pack/day for 31.3 years (31.3 ttl pk-yrs)     Types: Cigarettes     Start date: 1993    Smokeless tobacco: Never    Tobacco comments:     Pt states he has stopped smoking with Chantix three weeks ago. 12/1/23   Substance and Sexual Activity    Alcohol use: Yes     Comment: ocass    Drug use: Not Currently    Sexual activity: Not Currently       Family History:   Family History   Problem Relation Name Age of Onset    Asthma Mother         Review of Systems:  Review of Systems   Constitutional:  Positive for malaise/fatigue. Negative for chills and fever.   HENT:  Negative for nosebleeds and sore throat.    Eyes:  Negative for pain and redness.   Respiratory:  Negative for shortness of breath and wheezing.    Cardiovascular:  Negative for chest pain and leg swelling.   Gastrointestinal:  Positive for abdominal pain, blood in stool and diarrhea. Negative for  "constipation, melena, nausea and vomiting.   Genitourinary:  Negative for dysuria and hematuria.   Musculoskeletal:  Negative for falls and myalgias.   Skin:  Negative for itching and rash.   Neurological:  Negative for focal weakness and loss of consciousness.       OBJECTIVE:    Physical Exam:  24 Hour Vital Sign Ranges: Temp:  [98 °F (36.7 °C)-98.2 °F (36.8 °C)] 98.2 °F (36.8 °C)  Pulse:  [78-85] 85  Resp:  [17-20] 17  SpO2:  [94 %-98 %] 94 %  BP: (120-133)/(56-98) 133/80  Most recent vitals: /80 (BP Location: Left arm, Patient Position: Lying)   Pulse 85   Temp 98.2 °F (36.8 °C) (Oral)   Resp 17   Ht 5' 5" (1.651 m)   Wt 131.1 kg (289 lb 0.4 oz)   SpO2 (!) 94%   BMI 48.10 kg/m²    CONSTITUTIONAL: laying in bed, NAD  Eyes: PERRL, anicteric conjunctivae  ENT: nares patent, oral mucosa pink and moist without lesion  NECK: trachea midline; Good ROM  CV: regular rate and rhythm, no murmurs or gallops  RESP: clear to auscultation bilaterally, no wheezes, rhonci or rales  ABD: soft, non-tender, non-distended, normal bowel sounds  MSK: no swelling or edema, 2+ pulses distally  INTEGUMENT: warm/dry, no rashes or jaundice on limited skin exam  NEURO: appropriately conversant, no asterixis  PSYCH: normal affect    Labs:   I have personally reviewed the pertinent/available labs in [facility].     Recent Labs     05/03/24  1401 05/04/24  0525   WBC 16.20* 12.04   * 101*    307     Recent Labs     05/03/24  1401 05/04/24  0525    135*   K 4.1 4.8    103   CO2 27 25   BUN 11 8   GLU 86 86     No results for input(s): "ALB" in the last 72 hours.    Invalid input(s): "ALKP", "SGOT", "SGPT", "TBIL", "DBIL", "TPRO"  No results for input(s): "PT", "INR", "PTT" in the last 72 hours.      Radiology Review:  I have personally reviewed the recent available imaging in Epic.    CT Abdomen Pelvis With IV Contrast NO Oral Contrast   Final Result      Colonic wall thickening and pericolonic inflammatory " stranding extending from the distal transverse colon through the descending colon, sigmoid colon, and rectum - compatible with acute colitis in this patient with history of ulcerative colitis.      No evidence of perforation or abscess.      Bilateral nonobstructing nephrolithiasis.      Small cluster of reticulonodular densities dependent right lower lobe, likely infectious/inflammatory nature.  Please correlate with pulmonary symptoms.         Electronically signed by: Rickie Brown   Date:    05/03/2024   Time:    17:05          Endoscopy:  I have personally reviewed and interpreted the reports and images from the endoscopy reports available in Epic.      IMPRESSION / RECOMMENDATIONS:  Acute on Chronic Ulcerative Colitis  - known L sided disease recently on Entyvio but just switched to Omvoh with first loading infusion on 4/23/24  - Recent admission in March 2024 for similar presentation. Sent home on oral steroids and to continue Entyvio. Just finished PO steroid taper.  - Had endoscopic evaluation at that time showing findings consistent with UC. No overlapping viral infection noted.  - C diff negative  - Agree with IV solumedrol 60 mg daily for now  - based on imaging and presentation along with recent endoscopy, not sure that repeating endoscopy at this time is necessary  - can schedule bentyl before meals to see if this helps with abdominal pain    Thank you for this consult.    David Millan  5/4/2024  12:14 PM

## 2024-05-04 NOTE — PLAN OF CARE
[facility]  Initial Discharge Assessment       Primary Care Provider: Hunter Aguilar III, MD    Admission Diagnosis: Ulcerative colitis [K51.90]    Admission Date: 5/3/2024  Expected Discharge Date: 5/5/2024    Transition of Care Barriers: None  Met with patient at bedside to complete assessment. No living will, POA or advance directive. No HH, HD, DME or Coumadin. Patient's family will bring him home when he is discharged. No needs were identified at this time.    Payor: [employer] / Plan: [employer] ([employer]) / Product Type: Commercial /     Extended Emergency Contact Information  Primary Emergency Contact: Estiven (Sister-In-Law)Andrea  Address: [address] [place] [address], [place] [address] [facility]  Home Phone: [phone]  Mobile Phone: [phone]  Relation: Sister  Preferred language: English   needed? No  Secondary Emergency Contact: EstivenDallas caban   [place]  Mobile Phone: [phone]  Relation: Brother  Preferred language: English   needed? No    Discharge Plan A: Home  Discharge Plan B: Home      [id number] - [place], [place] - [address] [facility]  [address]  [address]  [place] [address]  Phone: [phone] Fax: [phone]    [facility] [address]  [address] [facility] [facility] [address]  Phone: [phone] Fax: [phone]    [facility]  [address]  [facility] [address]  Phone: [phone] Fax: [phone]      Initial Assessment (most recent)       Adult Discharge Assessment - 05/04/24 1049          Discharge Assessment    Assessment Type Discharge Planning Assessment     Confirmed/corrected address, phone number and insurance Yes     Confirmed Demographics Correct on Facesheet     Source of Information patient     When was your last doctors appointment? --   2 weeks ago    Does  patient/caregiver understand observation status Yes     Communicated GERARDO with patient/caregiver Yes     Reason For Admission ulcerative colitis     People in Home alone     Facility Arrived From: home     Do you expect to return to your current living situation? Yes     Do you have help at home or someone to help you manage your care at home? Yes     Who are your caregiver(s) and their phone number(s)? Jessica Jean-Baptiste/sister in law [phone]     Prior to hospitilization cognitive status: Alert/Oriented;No Deficits     Current cognitive status: Alert/Oriented;No Deficits     Walking or Climbing Stairs Difficulty no     Dressing/Bathing Difficulty no     Equipment Currently Used at Home none     Readmission within 30 days? No     Patient currently being followed by outpatient case management? No     Do you currently have service(s) that help you manage your care at home? No     Do you take prescription medications? Yes     Do you have prescription coverage? Yes     Coverage [employer]     Do you have any problems affording any of your prescribed medications? No     Is the patient taking medications as prescribed? yes     Who is going to help you get home at discharge? Jessica Jean-Baptiste/sister in law [phone]     How do you get to doctors appointments? car, drives self     Are you on dialysis? No     Do you take coumadin? No     Discharge Plan A Home     Discharge Plan B Home     Discharge Plan discussed with: Patient     Transition of Care Barriers None

## 2024-05-04 NOTE — PLAN OF CARE
Problem: Adult Inpatient Plan of Care  Goal: Plan of Care Review  Outcome: Progressing  Goal: Patient-Specific Goal (Individualized)  Outcome: Progressing  Goal: Absence of Hospital-Acquired Illness or Injury  Outcome: Progressing  Goal: Optimal Comfort and Wellbeing  Outcome: Progressing  Goal: Readiness for Transition of Care  Outcome: Progressing     Problem: Bariatric Environmental Safety  Goal: Safety Maintained with Care  Outcome: Progressing     Problem: Diabetes Comorbidity  Goal: Blood Glucose Level Within Targeted Range  Outcome: Progressing     Problem: Infection  Goal: Absence of Infection Signs and Symptoms  Outcome: Progressing

## 2024-05-04 NOTE — ASSESSMENT & PLAN NOTE
Body mass index is 48.09 kg/m². Morbid obesity complicates all aspects of disease management from diagnostic modalities to treatment.

## 2024-05-04 NOTE — HPI
49 year old pt getting admitted with ulcerative colitis flare  Pt was admitted with same c/o this year(Feb/March)  Per Pt his last intake of steroids PO was few days ago  He was doing fine and started having profuse loose stools again  Noticed blood and pus in stools  Stools were not smelly as per pt(Has H/o C diff)  Symptoms got worse and pt came to hospital and got admitted   His last colonoscopy was in March this year

## 2024-05-04 NOTE — PROGRESS NOTES
Pharmacist Renal Dose Adjustment Note    Jacoby Jean-Baptiste is a 49 y.o. male being treated with the medication Enoxaparin.    Patient Data:    Vital Signs (Most Recent):  Temp: 98.3 °F (36.8 °C) (05/03/24 1202)  Pulse: 78 (05/03/24 1902)  Resp: 18 (05/03/24 1542)  BP: (!) 121/56 (05/03/24 1902)  SpO2: 98 % (05/03/24 1902) Vital Signs (72h Range):  Temp:  [98.3 °F (36.8 °C)]   Pulse:  [78-87]   Resp:  [18]   BP: (121-151)/(56-98)   SpO2:  [98 %-100 %]      Recent Labs   Lab 05/03/24  1401   CREATININE 1.0     Serum creatinine: 1 mg/dL 05/03/24 1401  Estimated creatinine clearance: 111.6 mL/min    Enoxaparin 40 mg subq every 24 hours will be changed to Enoxaparin 40 mg subq every 12 hours due to BMI> 40.    Pharmacist's Name: Joanna Husain  Pharmacist's Extension: [phone]

## 2024-05-05 LAB
ALBUMIN SERPL BCP-MCNC: 3.6 G/DL (ref 3.5–5.2)
ALP SERPL-CCNC: 66 U/L (ref 55–135)
ALT SERPL W/O P-5'-P-CCNC: 10 U/L (ref 10–44)
ANION GAP SERPL CALC-SCNC: 8 MMOL/L (ref 8–16)
AST SERPL-CCNC: 7 U/L (ref 10–40)
BASOPHILS # BLD AUTO: 0.04 K/UL (ref 0–0.2)
BASOPHILS NFR BLD: 0.3 % (ref 0–1.9)
BILIRUB SERPL-MCNC: 0.3 MG/DL (ref 0.1–1)
BUN SERPL-MCNC: 9 MG/DL (ref 6–20)
CALCIUM SERPL-MCNC: 8.9 MG/DL (ref 8.7–10.5)
CHLORIDE SERPL-SCNC: 102 MMOL/L (ref 95–110)
CO2 SERPL-SCNC: 24 MMOL/L (ref 23–29)
CREAT SERPL-MCNC: 0.9 MG/DL (ref 0.5–1.4)
DIFFERENTIAL METHOD BLD: ABNORMAL
EOSINOPHIL # BLD AUTO: 0.2 K/UL (ref 0–0.5)
EOSINOPHIL NFR BLD: 1.1 % (ref 0–8)
ERYTHROCYTE [DISTWIDTH] IN BLOOD BY AUTOMATED COUNT: 16.6 % (ref 11.5–14.5)
EST. GFR  (NO RACE VARIABLE): >60 ML/MIN/1.73 M^2
GLUCOSE SERPL-MCNC: 111 MG/DL (ref 70–110)
GLUCOSE SERPL-MCNC: 134 MG/DL (ref 70–110)
GLUCOSE SERPL-MCNC: 152 MG/DL (ref 70–110)
GLUCOSE SERPL-MCNC: 83 MG/DL (ref 70–110)
GLUCOSE SERPL-MCNC: 83 MG/DL (ref 70–110)
HCT VFR BLD AUTO: 37.6 % (ref 40–54)
HGB BLD-MCNC: 11.4 G/DL (ref 14–18)
IMM GRANULOCYTES # BLD AUTO: 0.12 K/UL (ref 0–0.04)
IMM GRANULOCYTES NFR BLD AUTO: 0.8 % (ref 0–0.5)
LYMPHOCYTES # BLD AUTO: 2.2 K/UL (ref 1–4.8)
LYMPHOCYTES NFR BLD: 14.7 % (ref 18–48)
MAGNESIUM SERPL-MCNC: 1.8 MG/DL (ref 1.6–2.6)
MCH RBC QN AUTO: 31 PG (ref 27–31)
MCHC RBC AUTO-ENTMCNC: 30.3 G/DL (ref 32–36)
MCV RBC AUTO: 102 FL (ref 82–98)
MONOCYTES # BLD AUTO: 1.9 K/UL (ref 0.3–1)
MONOCYTES NFR BLD: 12.6 % (ref 4–15)
NEUTROPHILS # BLD AUTO: 10.3 K/UL (ref 1.8–7.7)
NEUTROPHILS NFR BLD: 70.5 % (ref 38–73)
NRBC BLD-RTO: 0 /100 WBC
PLATELET # BLD AUTO: 380 K/UL (ref 150–450)
PMV BLD AUTO: 9.2 FL (ref 9.2–12.9)
POTASSIUM SERPL-SCNC: 3.9 MMOL/L (ref 3.5–5.1)
PROT SERPL-MCNC: 7.3 G/DL (ref 6–8.4)
RBC # BLD AUTO: 3.68 M/UL (ref 4.6–6.2)
SODIUM SERPL-SCNC: 134 MMOL/L (ref 136–145)
WBC # BLD AUTO: 14.65 K/UL (ref 3.9–12.7)

## 2024-05-05 PROCEDURE — 83735 ASSAY OF MAGNESIUM: CPT | Performed by: INTERNAL MEDICINE

## 2024-05-05 PROCEDURE — 99900031 HC PATIENT EDUCATION (STAT)

## 2024-05-05 PROCEDURE — 63600175 PHARM REV CODE 636 W HCPCS: Mod: JZ,JG | Performed by: INTERNAL MEDICINE

## 2024-05-05 PROCEDURE — 85025 COMPLETE CBC W/AUTO DIFF WBC: CPT | Performed by: INTERNAL MEDICINE

## 2024-05-05 PROCEDURE — 94799 UNLISTED PULMONARY SVC/PX: CPT

## 2024-05-05 PROCEDURE — 96372 THER/PROPH/DIAG INJ SC/IM: CPT | Performed by: INTERNAL MEDICINE

## 2024-05-05 PROCEDURE — 12000002 HC ACUTE/MED SURGE SEMI-PRIVATE ROOM

## 2024-05-05 PROCEDURE — 25000003 PHARM REV CODE 250: Performed by: INTERNAL MEDICINE

## 2024-05-05 PROCEDURE — 25000003 PHARM REV CODE 250: Performed by: STUDENT IN AN ORGANIZED HEALTH CARE EDUCATION/TRAINING PROGRAM

## 2024-05-05 PROCEDURE — 36415 COLL VENOUS BLD VENIPUNCTURE: CPT | Performed by: INTERNAL MEDICINE

## 2024-05-05 PROCEDURE — 99900035 HC TECH TIME PER 15 MIN (STAT)

## 2024-05-05 PROCEDURE — 63600175 PHARM REV CODE 636 W HCPCS

## 2024-05-05 PROCEDURE — 94761 N-INVAS EAR/PLS OXIMETRY MLT: CPT

## 2024-05-05 PROCEDURE — 25000003 PHARM REV CODE 250

## 2024-05-05 PROCEDURE — 94799 UNLISTED PULMONARY SVC/PX: CPT | Mod: XB

## 2024-05-05 PROCEDURE — 80053 COMPREHEN METABOLIC PANEL: CPT | Performed by: INTERNAL MEDICINE

## 2024-05-05 RX ORDER — DIPHENOXYLATE HYDROCHLORIDE AND ATROPINE SULFATE 2.5; .025 MG/1; MG/1
1 TABLET ORAL 2 TIMES DAILY
Status: DISCONTINUED | OUTPATIENT
Start: 2024-05-05 | End: 2024-05-09 | Stop reason: HOSPADM

## 2024-05-05 RX ADMIN — DICYCLOMINE HYDROCHLORIDE 20 MG: 10 CAPSULE ORAL at 11:05

## 2024-05-05 RX ADMIN — BUSPIRONE HYDROCHLORIDE 7.5 MG: 5 TABLET ORAL at 08:05

## 2024-05-05 RX ADMIN — BUSPIRONE HYDROCHLORIDE 7.5 MG: 5 TABLET ORAL at 02:05

## 2024-05-05 RX ADMIN — METRONIDAZOLE 500 MG: 5 INJECTION, SOLUTION INTRAVENOUS at 08:05

## 2024-05-05 RX ADMIN — Medication 800 MG: at 08:05

## 2024-05-05 RX ADMIN — DIPHENOXYLATE HYDROCHLORIDE AND ATROPINE SULFATE 1 TABLET: 2.5; .025 TABLET ORAL at 08:05

## 2024-05-05 RX ADMIN — METRONIDAZOLE 500 MG: 5 INJECTION, SOLUTION INTRAVENOUS at 11:05

## 2024-05-05 RX ADMIN — FAMOTIDINE 20 MG: 20 TABLET ORAL at 08:05

## 2024-05-05 RX ADMIN — METOPROLOL SUCCINATE 50 MG: 50 TABLET, FILM COATED, EXTENDED RELEASE ORAL at 08:05

## 2024-05-05 RX ADMIN — METHYLPREDNISOLONE SODIUM SUCCINATE 60 MG: 125 INJECTION, POWDER, FOR SOLUTION INTRAMUSCULAR; INTRAVENOUS at 08:05

## 2024-05-05 RX ADMIN — OXYCODONE HYDROCHLORIDE AND ACETAMINOPHEN 1 TABLET: 10; 325 TABLET ORAL at 05:05

## 2024-05-05 RX ADMIN — ZOLPIDEM TARTRATE 10 MG: 5 TABLET, COATED ORAL at 09:05

## 2024-05-05 RX ADMIN — Medication 800 MG: at 02:05

## 2024-05-05 RX ADMIN — OXYCODONE HYDROCHLORIDE AND ACETAMINOPHEN 1 TABLET: 10; 325 TABLET ORAL at 09:05

## 2024-05-05 RX ADMIN — OXYCODONE HYDROCHLORIDE AND ACETAMINOPHEN 1 TABLET: 10; 325 TABLET ORAL at 04:05

## 2024-05-05 RX ADMIN — CHOLESTYRAMINE LIGHT 4 G: 4 POWDER, FOR SUSPENSION ORAL at 02:05

## 2024-05-05 RX ADMIN — DICYCLOMINE HYDROCHLORIDE 20 MG: 10 CAPSULE ORAL at 04:05

## 2024-05-05 RX ADMIN — ENOXAPARIN SODIUM 40 MG: 40 INJECTION SUBCUTANEOUS at 08:05

## 2024-05-05 RX ADMIN — METRONIDAZOLE 500 MG: 5 INJECTION, SOLUTION INTRAVENOUS at 03:05

## 2024-05-05 RX ADMIN — DICYCLOMINE HYDROCHLORIDE 20 MG: 10 CAPSULE ORAL at 05:05

## 2024-05-05 RX ADMIN — CHOLESTYRAMINE LIGHT 4 G: 4 POWDER, FOR SUSPENSION ORAL at 08:05

## 2024-05-05 RX ADMIN — SERTRALINE HYDROCHLORIDE 100 MG: 50 TABLET ORAL at 08:05

## 2024-05-05 RX ADMIN — OXYCODONE HYDROCHLORIDE AND ACETAMINOPHEN 1 TABLET: 10; 325 TABLET ORAL at 01:05

## 2024-05-05 RX ADMIN — ONDANSETRON 4 MG: 2 INJECTION INTRAMUSCULAR; INTRAVENOUS at 09:05

## 2024-05-05 RX ADMIN — DIPHENOXYLATE HYDROCHLORIDE AND ATROPINE SULFATE 1 TABLET: 2.5; .025 TABLET ORAL at 02:05

## 2024-05-05 RX ADMIN — LEVOFLOXACIN 500 MG: 5 INJECTION, SOLUTION INTRAVENOUS at 09:05

## 2024-05-05 RX ADMIN — DICYCLOMINE HYDROCHLORIDE 20 MG: 10 CAPSULE ORAL at 08:05

## 2024-05-05 NOTE — PROGRESS NOTES
[facility] Medicine  Progress Note    Patient Name: Jacoby Jean-Baptiste  MRN: [id number]  Patient Class: IP- Inpatient   Admission Date: 5/3/2024  Length of Stay: 0 days  Attending Physician: Butch Rodriguez MD  Primary Care Provider: Hunter Aguilar III, MD        Subjective:     Principal Problem:Ulcerative colitis        HPI:  49 year old pt getting admitted with ulcerative colitis flare  Pt was admitted with same c/o this year(Feb/March)  Per Pt his last intake of steroids PO was few days ago  He was doing fine and started having profuse loose stools again  Noticed blood and pus in stools  Stools were not smelly as per pt(Has H/o C diff)  Symptoms got worse and pt came to hospital and got admitted   His last colonoscopy was in March this year     Overview/Hospital Course:  No notes on file    Interval History:  Patient seen and examined. Overnight notes reviewed.  Patient reports generalized abdominal pain and several episodes of diarrhea. He denies chest pain or shortness of breath. C stool study was negative. GI following. Solu-Medrol ordered.     Review of Systems   Respiratory:  Negative for shortness of breath.    Cardiovascular:  Negative for chest pain and palpitations.   Gastrointestinal:  Positive for abdominal pain and diarrhea.     Objective:     Vital Signs (Most Recent):  Temp: 97.8 °F (36.6 °C) (05/05/24 1131)  Pulse: 68 (05/05/24 1146)  Resp: 16 (05/05/24 1146)  BP: (!) 142/83 (05/05/24 1131)  SpO2: 97 % (05/05/24 1146) Vital Signs (24h Range):  Temp:  [97.4 °F (36.3 °C)-98.4 °F (36.9 °C)] 97.8 °F (36.6 °C)  Pulse:  [67-87] 68  Resp:  [16-22] 16  SpO2:  [91 %-100 %] 97 %  BP: (126-143)/(77-93) 142/83     Weight: 131.1 kg (289 lb 0.4 oz)  Body mass index is 48.1 kg/m².    Intake/Output Summary (Last 24 hours) at 5/5/2024 1522  Last data filed at 5/5/2024 0996  Gross per 24 hour   Intake 120 ml   Output --   Net 120 ml         Physical Exam  Vitals and nursing note reviewed.    Constitutional:       General: He is not in acute distress.     Appearance: Normal appearance. He is obese. He is ill-appearing (chronically).   HENT:      Head: Normocephalic and atraumatic.      Mouth/Throat:      Mouth: Mucous membranes are moist.      Pharynx: Oropharynx is clear.   Eyes:      Extraocular Movements: Extraocular movements intact.      Pupils: Pupils are equal, round, and reactive to light.   Cardiovascular:      Rate and Rhythm: Normal rate and regular rhythm.      Pulses: Normal pulses.      Heart sounds: Normal heart sounds.   Pulmonary:      Effort: Pulmonary effort is normal.      Breath sounds: Normal breath sounds.   Abdominal:      General: Abdomen is flat. Bowel sounds are normal.      Palpations: Abdomen is soft.      Tenderness: There is generalized abdominal tenderness. There is no guarding or rebound.   Musculoskeletal:      Cervical back: Normal range of motion and neck supple.   Skin:     General: Skin is warm.      Capillary Refill: Capillary refill takes less than 2 seconds.   Neurological:      General: No focal deficit present.      Mental Status: He is alert and oriented to person, place, and time. Mental status is at baseline.   Psychiatric:         Mood and Affect: Mood normal.         Behavior: Behavior normal.             Significant Labs: All pertinent labs within the past 24 hours have been reviewed.  CBC:   Recent Labs   Lab 05/04/24  0525 05/05/24  0630   WBC 12.04 14.65*   HGB 10.2* 11.4*   HCT 32.7* 37.6*    380     CMP:   Recent Labs   Lab 05/04/24  0525 05/05/24  0630   * 134*   K 4.8 3.9    102   CO2 25 24   GLU 86 83   BUN 8 9   CREATININE 0.9 0.9   CALCIUM 8.6* 8.9   PROT 6.4 7.3   ALBUMIN 3.2* 3.6   BILITOT 0.5 0.3   ALKPHOS 63 66   AST 8* 7*   ALT 13 10   ANIONGAP 7* 8       Significant Imaging: I have reviewed all pertinent imaging results/findings within the past 24 hours.    Assessment/Plan:      * Ulcerative colitis  Acute:  U colitis  "flare  Start iv steroids/iv abx   Obtain  stool to r/o C diff   Maintain cholestyramine PO  Stop Metformin/sertraline in view with loose stools  Stop PPI in view with C diff H/o   GI consulted:  Appreciate recommendations  Procal ordered for am lab draw      Long term systemic steroid user  Last PO Budesonide intake was few weeks ago      Atrial fibrillation, unspecified type  Per chart review    PUD (peptic ulcer disease)  Maintain pepcid      History of Clostridioides difficile colitis  Aware   C diff stool study negative      BMI 45.0-49.9, adult  Body mass index is 48.1 kg/m². Morbid obesity complicates all aspects of disease management from diagnostic modalities to treatment.     Type 2 diabetes mellitus without complication, without long-term current use of insulin  Patient's FSGs are controlled on current medication regimen.  Last A1c reviewed-   Lab Results   Component Value Date    HGBA1C 5.7 03/16/2024     Most recent fingerstick glucose reviewed- No results for input(s): "POCTGLUCOSE" in the last 24 hours.  Current correctional scale  Medium  Maintain anti-hyperglycemic dose as follows-   Antihyperglycemics (From admission, onward)      Start     Stop Route Frequency Ordered    05/03/24 2034  insulin aspart U-100 pen 0-10 Units         -- SubQ Before meals & nightly PRN 05/03/24 1934          Hold Oral hypoglycemics while patient is in the hospital.      VTE Risk Mitigation (From admission, onward)           Ordered     enoxaparin injection 40 mg  Every 12 hours         05/03/24 1937     IP VTE HIGH RISK PATIENT  Once         05/03/24 1934     Place sequential compression device  Until discontinued         05/03/24 1934                    Discharge Planning   GERARDO: 5/7/2024     Code Status: Full Code   Is the patient medically ready for discharge?:     Reason for patient still in hospital (select all that apply): Patient trending condition, Laboratory test, Treatment, Consult recommendations, and Pending " disposition  Discharge Plan A: Home                  Gely Mccoy PA-C  Department of Hospital Medicine   [facility]

## 2024-05-05 NOTE — CARE UPDATE
05/05/24 1146   Patient Assessment/Suction   Level of Consciousness (AVPU) alert   Respiratory Effort Normal;Unlabored   Expansion/Accessory Muscles/Retractions no use of accessory muscles   All Lung Fields Breath Sounds diminished   Rhythm/Pattern, Respiratory unlabored   Cough Frequency no cough   PRE-TX-O2   Device (Oxygen Therapy) room air   SpO2 97 %   Pulse Oximetry Type Intermittent   $ Pulse Oximetry - Multiple Charge Pulse Oximetry - Multiple   Pulse 68   Resp 16   Incentive Spirometer   $ Incentive Spirometer Charges done with encouragement   Incentive Spirometer Predicted Level (mL) 2250   Administration (IS) instruction provided, initial   Number of Repetitions (IS) 10   Level Incentive Spirometer (mL) 1000  (1500max)   Patient Tolerance (IS) no adverse signs/symptoms present;good   Education   $ Education 15 min  (IS/DEEP BREATHING)   Respiratory Evaluation   $ Care Plan Tech Time 15 min   $ Eval/Re-eval Charges Evaluation   Evaluation For New Orders   Admitting Diagnosis ULCERATIVE COLITIS   Cardiac Diagnosis NA   Pulmonary Diagnosis NA   Current Surgeries NA   Home Oxygen   Has Home Oxygen? No   Oxygen Care Plan   Rationale No rational found   Bronchodilator Care Plan   Rationale No Rationale found   Atelectasis Care Plan   Rationale No Rational Found   Airway Clearance Care Plan   Rationale No rationale found

## 2024-05-05 NOTE — SUBJECTIVE & OBJECTIVE
Interval History:  Patient seen and examined. Overnight notes reviewed.  Patient reports generalized abdominal pain and several episodes of diarrhea. He denies chest pain or shortness of breath. C stool study was negative. GI following. Solu-Medrol ordered.     Review of Systems   Respiratory:  Negative for shortness of breath.    Cardiovascular:  Negative for chest pain and palpitations.   Gastrointestinal:  Positive for abdominal pain and diarrhea.     Objective:     Vital Signs (Most Recent):  Temp: 97.8 °F (36.6 °C) (05/05/24 1131)  Pulse: 68 (05/05/24 1146)  Resp: 16 (05/05/24 1146)  BP: (!) 142/83 (05/05/24 1131)  SpO2: 97 % (05/05/24 1146) Vital Signs (24h Range):  Temp:  [97.4 °F (36.3 °C)-98.4 °F (36.9 °C)] 97.8 °F (36.6 °C)  Pulse:  [67-87] 68  Resp:  [16-22] 16  SpO2:  [91 %-100 %] 97 %  BP: (126-143)/(77-93) 142/83     Weight: 131.1 kg (289 lb 0.4 oz)  Body mass index is 48.1 kg/m².    Intake/Output Summary (Last 24 hours) at 5/5/2024 1523  Last data filed at 5/5/2024 0927  Gross per 24 hour   Intake 120 ml   Output --   Net 120 ml         Physical Exam  Vitals and nursing note reviewed.   Constitutional:       General: He is not in acute distress.     Appearance: Normal appearance. He is obese. He is ill-appearing (chronically).   HENT:      Head: Normocephalic and atraumatic.      Mouth/Throat:      Mouth: Mucous membranes are moist.      Pharynx: Oropharynx is clear.   Eyes:      Extraocular Movements: Extraocular movements intact.      Pupils: Pupils are equal, round, and reactive to light.   Cardiovascular:      Rate and Rhythm: Normal rate and regular rhythm.      Pulses: Normal pulses.      Heart sounds: Normal heart sounds.   Pulmonary:      Effort: Pulmonary effort is normal.      Breath sounds: Normal breath sounds.   Abdominal:      General: Abdomen is flat. Bowel sounds are normal.      Palpations: Abdomen is soft.      Tenderness: There is generalized abdominal tenderness. There is no  guarding or rebound.   Musculoskeletal:      Cervical back: Normal range of motion and neck supple.   Skin:     General: Skin is warm.      Capillary Refill: Capillary refill takes less than 2 seconds.   Neurological:      General: No focal deficit present.      Mental Status: He is alert and oriented to person, place, and time. Mental status is at baseline.   Psychiatric:         Mood and Affect: Mood normal.         Behavior: Behavior normal.             Significant Labs: All pertinent labs within the past 24 hours have been reviewed.  CBC:   Recent Labs   Lab 05/04/24 0525 05/05/24  0630   WBC 12.04 14.65*   HGB 10.2* 11.4*   HCT 32.7* 37.6*    380     CMP:   Recent Labs   Lab 05/04/24  0525 05/05/24  0630   * 134*   K 4.8 3.9    102   CO2 25 24   GLU 86 83   BUN 8 9   CREATININE 0.9 0.9   CALCIUM 8.6* 8.9   PROT 6.4 7.3   ALBUMIN 3.2* 3.6   BILITOT 0.5 0.3   ALKPHOS 63 66   AST 8* 7*   ALT 13 10   ANIONGAP 7* 8       Significant Imaging: I have reviewed all pertinent imaging results/findings within the past 24 hours.

## 2024-05-05 NOTE — PROGRESS NOTES
Patient Name: Jacoby Jean-Baptiste  Patient MRN: [id number]  Patient : 1975    Admit Date: 5/3/2024  Service date: 2024    Reason for Consult: UC Flare    PCP: Hunter Aguilar III, MD    Brief HPI:  Patient is a 49 y.o. male with PMHx Ulcerative Colitis, HTN, DM2, h/o C diff infection who presents with abdominal pain and diarrhea. CT consistent with L sided UC. Recently started on Entyvio after Remicade failure but then changed to Omvoh recently with his first loading infusion on 24. Having 10-15 BMs/day with blood and pus. Doesn't have C diff odor. Just finished 40 mg prednisone course a few days ago. Had been on for 10 days prior with no taper. Abdominal pain diffuse. Diarrhea and pain worse when eating.     CHART REVIEW:   Colonoscopy 3/'24: [facility] 3 L sided disease  Hospitalization ; CT  - fatty liver; L sided colitis; Calpro back up ot 3070 (201 on remicade); Neg C. diff  Entyvio started   Hospitalization  - cavitary fungal lesions  Labs 10/'23 - C.diff negative after vanco and rebyota enemas.  Labs  -  Calprotectin 201 on remicade w/ C. diff...tx w/ vanco w/ long taper +/- rebyota; Nml CRP much better on remicade  EGD  - candidate esophagitis / duodenitis; HP neg gastirits  Hospitalization  - colitis on CT; C. diff neg; Remicade loaded  Colon 3/'23 - Pan-colitis; Nml TI; s/p FMT for refractory C. diff  C. DIff + 3/'23  Labs  - CRP 21; Stool PCR + C. DIff....  CT  - Left sided colitis s/p karina  Colon  - Nml R colon bx; Chronic sigmoid colitis / proctitis....Suspect ulcerative proctitis / distal colitis     S: Still with abdominal pain and >10 BMs. Ate breakfast..     Inpatient Medications:  Current Facility-Administered Medications   Medication Dose Route Frequency    busPIRone  7.5 mg Oral TID    cholestyramine-aspartame  1 packet Oral TID    dicyclomine  20 mg Oral QID (AC & HS)    enoxparin  40 mg Subcutaneous Q12H (prophylaxis, 0900/2100)     "famotidine  20 mg Oral BID    levoFLOXacin  500 mg Intravenous Q24H    methylPREDNISolone sodium succinate injection  60 mg Intravenous Daily    metoprolol succinate  50 mg Oral Daily    metronidazole  500 mg Intravenous Q8H    sertraline  100 mg Oral Daily       Current Facility-Administered Medications:     acetaminophen, 650 mg, Oral, Q8H PRN    acetaminophen, 650 mg, Oral, Q4H PRN    aluminum-magnesium hydroxide-simethicone, 30 mL, Oral, QID PRN    dextrose 50%, 12.5 g, Intravenous, PRN    dextrose 50%, 25 g, Intravenous, PRN    glucagon (human recombinant), 1 mg, Intramuscular, PRN    glucose, 16 g, Oral, PRN    glucose, 24 g, Oral, PRN    insulin aspart U-100, 0-10 Units, Subcutaneous, QID (AC + HS) PRN    LORazepam, 0.5 mg, Oral, Q6H PRN    magnesium oxide, 800 mg, Oral, PRN    magnesium oxide, 800 mg, Oral, PRN    melatonin, 6 mg, Oral, Nightly PRN    ondansetron, 4 mg, Intravenous, Q6H PRN    oxyCODONE-acetaminophen, 1 tablet, Oral, Q4H PRN    oxyCODONE-acetaminophen, 1 tablet, Oral, Q4H PRN    potassium bicarbonate, 35 mEq, Oral, PRN    potassium bicarbonate, 50 mEq, Oral, PRN    potassium bicarbonate, 60 mEq, Oral, PRN    potassium, sodium phosphates, 2 packet, Oral, PRN    potassium, sodium phosphates, 2 packet, Oral, PRN    potassium, sodium phosphates, 2 packet, Oral, PRN    zolpidem, 10 mg, Oral, Nightly PRN    Review of Systems:  A 10 point review of systems was performed and was normal, except as mentioned in the HPI, including constitutional, HEENT, heme, lymph, cardiovascular, respiratory, gastrointestinal, genitourinary, neurologic, endocrine, psychiatric and musculoskeletal.      OBJECTIVE:    Physical Exam:  24 Hour Vital Sign Ranges: Temp:  [97.4 °F (36.3 °C)-98.4 °F (36.9 °C)] 97.8 °F (36.6 °C)  Pulse:  [67-87] 68  Resp:  [16-22] 16  SpO2:  [91 %-100 %] 97 %  BP: (126-143)/(77-93) 142/83  Most recent vitals: BP (!) 142/83   Pulse 68   Temp 97.8 °F (36.6 °C) (Oral)   Resp 16   Ht 5' 5" " "(1.651 m)   Wt 131.1 kg (289 lb 0.4 oz)   SpO2 97%   BMI 48.10 kg/m²    GEN: well-developed, well-nourished, awake and alert, non-toxic appearing adult  HEENT: PERRL, sclera anicteric, oral mucosa pink and moist without lesion  NECK: trachea midline; Good ROM  CV: regular rate and rhythm, no murmurs or gallops  RESP: clear to auscultation bilaterally, no wheezes, rhonci or rales  ABD: soft, TTP, non-distended, normal bowel sounds  EXT: no swelling or edema, 2+ pulses distally  SKIN: no rashes or jaundice  PSYCH: normal affect    Labs:   Recent Labs     05/03/24  1401 05/04/24  0525 05/05/24  0630   WBC 16.20* 12.04 14.65*   * 101* 102*    307 380     Recent Labs     05/03/24  1401 05/04/24  0525 05/05/24  0630    135* 134*   K 4.1 4.8 3.9    103 102   CO2 27 25 24   BUN 11 8 9   GLU 86 86 83     No results for input(s): "ALB" in the last 72 hours.    Invalid input(s): "ALKP", "SGOT", "SGPT", "TBIL", "DBIL", "TPRO"  No results for input(s): "PT", "INR", "PTT" in the last 72 hours.      Radiology Review:  CT Abdomen Pelvis With IV Contrast NO Oral Contrast   Final Result      Colonic wall thickening and pericolonic inflammatory stranding extending from the distal transverse colon through the descending colon, sigmoid colon, and rectum - compatible with acute colitis in this patient with history of ulcerative colitis.      No evidence of perforation or abscess.      Bilateral nonobstructing nephrolithiasis.      Small cluster of reticulonodular densities dependent right lower lobe, likely infectious/inflammatory nature.  Please correlate with pulmonary symptoms.         Electronically signed by: Rickie Brown   Date:    05/03/2024   Time:    17:05          Endoscopy:  I have personally reviewed the reports and images from available procedure notes in Epic.      IMPRESSION / RECOMMENDATIONS:  Acute on Chronic Ulcerative Colitis  - known L sided disease recently on Entyvio but just switched " to [facility] with first loading infusion on 4/23/24  - Recent admission in March 2024 for similar presentation. Sent home on oral steroids and to continue Entyvio. Just finished PO steroid taper.  - Had endoscopic evaluation at that time showing findings consistent with UC. No overlapping viral infection noted.  - C diff negative  - Agree with IV solumedrol 60 mg daily for now. Continue.  - based on imaging and presentation along with recent endoscopy, not sure that repeating endoscopy at this time is necessary  - can schedule bentyl before meals to see if this helps with abdominal pain  - add some Lomotil to help slow down BMs      David Millan  5/5/2024  12:54 PM

## 2024-05-05 NOTE — ASSESSMENT & PLAN NOTE
Acute:  U colitis flare  Start iv steroids/iv abx   Obtain  stool to r/o C diff   Maintain cholestyramine PO  Stop Metformin/sertraline in view with loose stools  Stop PPI in view with C diff H/o   GI consulted:  Appreciate recommendations  Procal ordered for am lab draw

## 2024-05-06 LAB
ALBUMIN SERPL BCP-MCNC: 3.5 G/DL (ref 3.5–5.2)
ALP SERPL-CCNC: 55 U/L (ref 55–135)
ALT SERPL W/O P-5'-P-CCNC: 11 U/L (ref 10–44)
ANION GAP SERPL CALC-SCNC: 8 MMOL/L (ref 8–16)
AST SERPL-CCNC: 9 U/L (ref 10–40)
BASOPHILS # BLD AUTO: 0.03 K/UL (ref 0–0.2)
BASOPHILS NFR BLD: 0.3 % (ref 0–1.9)
BILIRUB SERPL-MCNC: 0.3 MG/DL (ref 0.1–1)
BUN SERPL-MCNC: 8 MG/DL (ref 6–20)
CALCIUM SERPL-MCNC: 8.7 MG/DL (ref 8.7–10.5)
CHLORIDE SERPL-SCNC: 102 MMOL/L (ref 95–110)
CO2 SERPL-SCNC: 25 MMOL/L (ref 23–29)
CREAT SERPL-MCNC: 0.9 MG/DL (ref 0.5–1.4)
DIFFERENTIAL METHOD BLD: ABNORMAL
EOSINOPHIL # BLD AUTO: 0.2 K/UL (ref 0–0.5)
EOSINOPHIL NFR BLD: 1.6 % (ref 0–8)
ERYTHROCYTE [DISTWIDTH] IN BLOOD BY AUTOMATED COUNT: 16.8 % (ref 11.5–14.5)
EST. GFR  (NO RACE VARIABLE): >60 ML/MIN/1.73 M^2
GLUCOSE SERPL-MCNC: 115 MG/DL (ref 70–110)
GLUCOSE SERPL-MCNC: 129 MG/DL (ref 70–110)
GLUCOSE SERPL-MCNC: 75 MG/DL (ref 70–110)
GLUCOSE SERPL-MCNC: 75 MG/DL (ref 70–110)
HCT VFR BLD AUTO: 36.9 % (ref 40–54)
HGB BLD-MCNC: 11 G/DL (ref 14–18)
IMM GRANULOCYTES # BLD AUTO: 0.05 K/UL (ref 0–0.04)
IMM GRANULOCYTES NFR BLD AUTO: 0.5 % (ref 0–0.5)
LYMPHOCYTES # BLD AUTO: 1.9 K/UL (ref 1–4.8)
LYMPHOCYTES NFR BLD: 18.6 % (ref 18–48)
MAGNESIUM SERPL-MCNC: 1.8 MG/DL (ref 1.6–2.6)
MCH RBC QN AUTO: 30.6 PG (ref 27–31)
MCHC RBC AUTO-ENTMCNC: 29.8 G/DL (ref 32–36)
MCV RBC AUTO: 103 FL (ref 82–98)
MONOCYTES # BLD AUTO: 1.6 K/UL (ref 0.3–1)
MONOCYTES NFR BLD: 15.2 % (ref 4–15)
NEUTROPHILS # BLD AUTO: 6.7 K/UL (ref 1.8–7.7)
NEUTROPHILS NFR BLD: 63.8 % (ref 38–73)
NRBC BLD-RTO: 0 /100 WBC
PLATELET # BLD AUTO: 350 K/UL (ref 150–450)
PMV BLD AUTO: 9.3 FL (ref 9.2–12.9)
POTASSIUM SERPL-SCNC: 3.9 MMOL/L (ref 3.5–5.1)
PROCALCITONIN SERPL IA-MCNC: 0.27 NG/ML (ref 0–0.5)
PROT SERPL-MCNC: 6.9 G/DL (ref 6–8.4)
RBC # BLD AUTO: 3.59 M/UL (ref 4.6–6.2)
SODIUM SERPL-SCNC: 135 MMOL/L (ref 136–145)
WBC # BLD AUTO: 10.42 K/UL (ref 3.9–12.7)
WBC #/AREA STL HPF: ABNORMAL /[HPF]

## 2024-05-06 PROCEDURE — 25000003 PHARM REV CODE 250: Performed by: INTERNAL MEDICINE

## 2024-05-06 PROCEDURE — 85025 COMPLETE CBC W/AUTO DIFF WBC: CPT | Performed by: INTERNAL MEDICINE

## 2024-05-06 PROCEDURE — 25000003 PHARM REV CODE 250

## 2024-05-06 PROCEDURE — 12000002 HC ACUTE/MED SURGE SEMI-PRIVATE ROOM

## 2024-05-06 PROCEDURE — 84145 PROCALCITONIN (PCT): CPT

## 2024-05-06 PROCEDURE — 30000890 LABCORP MISCELLANEOUS TEST: Performed by: INTERNAL MEDICINE

## 2024-05-06 PROCEDURE — 87045 FECES CULTURE AEROBIC BACT: CPT | Performed by: INTERNAL MEDICINE

## 2024-05-06 PROCEDURE — 87046 STOOL CULTR AEROBIC BACT EA: CPT | Performed by: INTERNAL MEDICINE

## 2024-05-06 PROCEDURE — 83735 ASSAY OF MAGNESIUM: CPT | Performed by: INTERNAL MEDICINE

## 2024-05-06 PROCEDURE — 94799 UNLISTED PULMONARY SVC/PX: CPT

## 2024-05-06 PROCEDURE — 25000003 PHARM REV CODE 250: Performed by: STUDENT IN AN ORGANIZED HEALTH CARE EDUCATION/TRAINING PROGRAM

## 2024-05-06 PROCEDURE — 80053 COMPREHEN METABOLIC PANEL: CPT | Performed by: INTERNAL MEDICINE

## 2024-05-06 PROCEDURE — 87177 OVA AND PARASITES SMEARS: CPT | Performed by: INTERNAL MEDICINE

## 2024-05-06 PROCEDURE — 36415 COLL VENOUS BLD VENIPUNCTURE: CPT

## 2024-05-06 PROCEDURE — 89055 LEUKOCYTE ASSESSMENT FECAL: CPT | Performed by: INTERNAL MEDICINE

## 2024-05-06 PROCEDURE — 36415 COLL VENOUS BLD VENIPUNCTURE: CPT | Performed by: INTERNAL MEDICINE

## 2024-05-06 PROCEDURE — 63600175 PHARM REV CODE 636 W HCPCS

## 2024-05-06 PROCEDURE — 94761 N-INVAS EAR/PLS OXIMETRY MLT: CPT

## 2024-05-06 PROCEDURE — 87449 NOS EACH ORGANISM AG IA: CPT | Performed by: INTERNAL MEDICINE

## 2024-05-06 PROCEDURE — 63600175 PHARM REV CODE 636 W HCPCS: Mod: JZ,JG | Performed by: INTERNAL MEDICINE

## 2024-05-06 RX ORDER — DIPHENOXYLATE HYDROCHLORIDE AND ATROPINE SULFATE 2.5; .025 MG/5ML; MG/5ML
5 SOLUTION ORAL ONCE
Qty: 5 ML | Refills: 0 | Status: COMPLETED | OUTPATIENT
Start: 2024-05-06 | End: 2024-05-06

## 2024-05-06 RX ADMIN — SERTRALINE HYDROCHLORIDE 100 MG: 50 TABLET ORAL at 08:05

## 2024-05-06 RX ADMIN — FAMOTIDINE 20 MG: 20 TABLET ORAL at 09:05

## 2024-05-06 RX ADMIN — DICYCLOMINE HYDROCHLORIDE 20 MG: 10 CAPSULE ORAL at 12:05

## 2024-05-06 RX ADMIN — ENOXAPARIN SODIUM 40 MG: 40 INJECTION SUBCUTANEOUS at 08:05

## 2024-05-06 RX ADMIN — METRONIDAZOLE 500 MG: 5 INJECTION, SOLUTION INTRAVENOUS at 05:05

## 2024-05-06 RX ADMIN — METHYLPREDNISOLONE SODIUM SUCCINATE 60 MG: 125 INJECTION, POWDER, FOR SOLUTION INTRAMUSCULAR; INTRAVENOUS at 08:05

## 2024-05-06 RX ADMIN — BUSPIRONE HYDROCHLORIDE 7.5 MG: 5 TABLET ORAL at 08:05

## 2024-05-06 RX ADMIN — FAMOTIDINE 20 MG: 20 TABLET ORAL at 08:05

## 2024-05-06 RX ADMIN — DICYCLOMINE HYDROCHLORIDE 20 MG: 10 CAPSULE ORAL at 04:05

## 2024-05-06 RX ADMIN — METOPROLOL SUCCINATE 50 MG: 50 TABLET, FILM COATED, EXTENDED RELEASE ORAL at 08:05

## 2024-05-06 RX ADMIN — CHOLESTYRAMINE LIGHT 4 G: 4 POWDER, FOR SUSPENSION ORAL at 09:05

## 2024-05-06 RX ADMIN — OXYCODONE HYDROCHLORIDE AND ACETAMINOPHEN 1 TABLET: 10; 325 TABLET ORAL at 04:05

## 2024-05-06 RX ADMIN — DICYCLOMINE HYDROCHLORIDE 20 MG: 10 CAPSULE ORAL at 05:05

## 2024-05-06 RX ADMIN — CHOLESTYRAMINE LIGHT 4 G: 4 POWDER, FOR SUSPENSION ORAL at 04:05

## 2024-05-06 RX ADMIN — OXYCODONE HYDROCHLORIDE AND ACETAMINOPHEN 1 TABLET: 10; 325 TABLET ORAL at 02:05

## 2024-05-06 RX ADMIN — DIPHENOXYLATE HYDROCHLORIDE AND ATROPINE SULFATE 5 ML: 2.5; .025 SOLUTION ORAL at 02:05

## 2024-05-06 RX ADMIN — OXYCODONE HYDROCHLORIDE AND ACETAMINOPHEN 1 TABLET: 10; 325 TABLET ORAL at 08:05

## 2024-05-06 RX ADMIN — DIPHENOXYLATE HYDROCHLORIDE AND ATROPINE SULFATE 1 TABLET: 2.5; .025 TABLET ORAL at 08:05

## 2024-05-06 RX ADMIN — ZOLPIDEM TARTRATE 10 MG: 5 TABLET, COATED ORAL at 09:05

## 2024-05-06 RX ADMIN — BUSPIRONE HYDROCHLORIDE 7.5 MG: 5 TABLET ORAL at 03:05

## 2024-05-06 RX ADMIN — CHOLESTYRAMINE LIGHT 4 G: 4 POWDER, FOR SUSPENSION ORAL at 02:05

## 2024-05-06 RX ADMIN — ENOXAPARIN SODIUM 40 MG: 40 INJECTION SUBCUTANEOUS at 09:05

## 2024-05-06 RX ADMIN — CHOLESTYRAMINE LIGHT 4 G: 4 POWDER, FOR SUSPENSION ORAL at 08:05

## 2024-05-06 RX ADMIN — DIPHENOXYLATE HYDROCHLORIDE AND ATROPINE SULFATE 1 TABLET: 2.5; .025 TABLET ORAL at 09:05

## 2024-05-06 RX ADMIN — ONDANSETRON 4 MG: 2 INJECTION INTRAMUSCULAR; INTRAVENOUS at 08:05

## 2024-05-06 RX ADMIN — OXYCODONE HYDROCHLORIDE AND ACETAMINOPHEN 1 TABLET: 10; 325 TABLET ORAL at 09:05

## 2024-05-06 RX ADMIN — DICYCLOMINE HYDROCHLORIDE 20 MG: 10 CAPSULE ORAL at 09:05

## 2024-05-06 RX ADMIN — BUSPIRONE HYDROCHLORIDE 7.5 MG: 5 TABLET ORAL at 09:05

## 2024-05-06 NOTE — SUBJECTIVE & OBJECTIVE
Interval History:  Patient seen and examined. Overnight notes reviewed.  Patient reports generalized abdominal pain and several episodes of diarrhea approximately 7 episodes overnight and 3 episodes this morning.  Bentyl and Lomotil ordered. He denies chest pain or shortness of breath.  GI following. Solu-Medrol ordered.     Review of Systems   Respiratory:  Negative for shortness of breath.    Cardiovascular:  Negative for chest pain and palpitations.   Gastrointestinal:  Positive for abdominal pain and diarrhea.     Objective:     Vital Signs (Most Recent):  Temp: 97.9 °F (36.6 °C) (05/06/24 1100)  Pulse: 76 (05/06/24 1100)  Resp: 19 (05/06/24 1100)  BP: (!) 147/79 (05/06/24 1100)  SpO2: 95 % (05/06/24 1100) Vital Signs (24h Range):  Temp:  [97.8 °F (36.6 °C)-98.5 °F (36.9 °C)] 97.9 °F (36.6 °C)  Pulse:  [60-81] 76  Resp:  [16-19] 19  SpO2:  [95 %-99 %] 95 %  BP: (106-147)/(56-81) 147/79     Weight: 131.1 kg (289 lb 0.4 oz)  Body mass index is 48.1 kg/m².    Intake/Output Summary (Last 24 hours) at 5/6/2024 1554  Last data filed at 5/6/2024 0841  Gross per 24 hour   Intake 440 ml   Output --   Net 440 ml         Physical Exam  Vitals and nursing note reviewed.   Constitutional:       General: He is not in acute distress.     Appearance: Normal appearance. He is obese. He is ill-appearing (chronically).   HENT:      Head: Normocephalic and atraumatic.      Mouth/Throat:      Mouth: Mucous membranes are moist.      Pharynx: Oropharynx is clear.   Eyes:      Extraocular Movements: Extraocular movements intact.      Pupils: Pupils are equal, round, and reactive to light.   Cardiovascular:      Rate and Rhythm: Normal rate and regular rhythm.      Pulses: Normal pulses.      Heart sounds: Normal heart sounds.   Pulmonary:      Effort: Pulmonary effort is normal.      Breath sounds: Normal breath sounds.   Abdominal:      General: Abdomen is flat. Bowel sounds are normal.      Palpations: Abdomen is soft.       Tenderness: There is generalized abdominal tenderness. There is no guarding or rebound.   Musculoskeletal:      Cervical back: Normal range of motion and neck supple.   Skin:     General: Skin is warm.      Capillary Refill: Capillary refill takes less than 2 seconds.   Neurological:      General: No focal deficit present.      Mental Status: He is alert and oriented to person, place, and time. Mental status is at baseline.   Psychiatric:         Mood and Affect: Mood normal.         Behavior: Behavior normal.             Significant Labs: All pertinent labs within the past 24 hours have been reviewed.  CBC:   Recent Labs   Lab 05/05/24 0630 05/06/24  0636   WBC 14.65* 10.42   HGB 11.4* 11.0*   HCT 37.6* 36.9*    350     CMP:   Recent Labs   Lab 05/05/24 0630 05/06/24  0636   * 135*   K 3.9 3.9    102   CO2 24 25   GLU 83 75   BUN 9 8   CREATININE 0.9 0.9   CALCIUM 8.9 8.7   PROT 7.3 6.9   ALBUMIN 3.6 3.5   BILITOT 0.3 0.3   ALKPHOS 66 55   AST 7* 9*   ALT 10 11   ANIONGAP 8 8       Significant Imaging: I have reviewed all pertinent imaging results/findings within the past 24 hours.

## 2024-05-06 NOTE — PROGRESS NOTES
Patient Name: Jacoby Jean-Baptiste  Patient MRN: [id number]  Patient : 1975    Admit Date: 5/3/2024  Service date: 2024    Reason for Consult: diarrhea    PCP: Hunter Aguilar III, MD    S: pt on omvoh.  16 stools yesterday.  4 bm today.  Mild nagging left lower discomfort..     Inpatient Medications:  Current Facility-Administered Medications   Medication Dose Route Frequency    busPIRone  7.5 mg Oral TID    cholestyramine-aspartame  1 packet Oral TID    dicyclomine  20 mg Oral QID (AC & HS)    diphenoxylate-atropine 2.5-0.025 mg  1 tablet Oral BID    enoxparin  40 mg Subcutaneous Q12H (prophylaxis, 900/)    famotidine  20 mg Oral BID    methylPREDNISolone sodium succinate injection  60 mg Intravenous Daily    metoprolol succinate  50 mg Oral Daily    sertraline  100 mg Oral Daily       Current Facility-Administered Medications:     acetaminophen, 650 mg, Oral, Q8H PRN    acetaminophen, 650 mg, Oral, Q4H PRN    aluminum-magnesium hydroxide-simethicone, 30 mL, Oral, QID PRN    dextrose 50%, 12.5 g, Intravenous, PRN    dextrose 50%, 25 g, Intravenous, PRN    glucagon (human recombinant), 1 mg, Intramuscular, PRN    glucose, 16 g, Oral, PRN    glucose, 24 g, Oral, PRN    insulin aspart U-100, 0-10 Units, Subcutaneous, QID (AC + HS) PRN    LORazepam, 0.5 mg, Oral, Q6H PRN    magnesium oxide, 800 mg, Oral, PRN    magnesium oxide, 800 mg, Oral, PRN    melatonin, 6 mg, Oral, Nightly PRN    ondansetron, 4 mg, Intravenous, Q6H PRN    oxyCODONE-acetaminophen, 1 tablet, Oral, Q4H PRN    oxyCODONE-acetaminophen, 1 tablet, Oral, Q4H PRN    potassium bicarbonate, 35 mEq, Oral, PRN    potassium bicarbonate, 50 mEq, Oral, PRN    potassium bicarbonate, 60 mEq, Oral, PRN    potassium, sodium phosphates, 2 packet, Oral, PRN    potassium, sodium phosphates, 2 packet, Oral, PRN    potassium, sodium phosphates, 2 packet, Oral, PRN    zolpidem, 10 mg, Oral, Nightly PRN    Review of Systems:  A 10 point review of systems was  "performed and was normal, except as mentioned in the HPI, including constitutional, HEENT, heme, lymph, cardiovascular, respiratory, gastrointestinal, genitourinary, neurologic, endocrine, psychiatric and musculoskeletal.      OBJECTIVE:    Physical Exam:  24 Hour Vital Sign Ranges: Temp:  [97.8 °F (36.6 °C)-98.5 °F (36.9 °C)] 97.8 °F (36.6 °C)  Pulse:  [60-81] 68  Resp:  [16-19] 19  SpO2:  [95 %-99 %] 98 %  BP: (106-142)/(56-83) 129/78  Most recent vitals: /78   Pulse 68   Temp 97.8 °F (36.6 °C) (Oral)   Resp 19   Ht 5' 5" (1.651 m)   Wt 131.1 kg (289 lb 0.4 oz)   SpO2 98%   BMI 48.10 kg/m²    GEN: well-developed, well-nourished, awake and alert, non-toxic appearing adult  HEENT: PERRL, sclera anicteric, oral mucosa pink and moist without lesion  NECK: trachea midline; Good ROM  CV: regular rate and rhythm, no murmurs or gallops  RESP: clear to auscultation bilaterally, no wheezes, rhonci or rales  ABD: soft, non-tender, non-distended, normal bowel sounds  EXT: no swelling or edema, 2+ pulses distally  SKIN: no rashes or jaundice  PSYCH: normal affect    Labs:   Recent Labs     05/04/24  0525 05/05/24  0630 05/06/24  0636   WBC 12.04 14.65* 10.42   * 102* 103*    380 350     Recent Labs     05/04/24 0525 05/05/24  0630 05/06/24  0636   * 134* 135*   K 4.8 3.9 3.9    102 102   CO2 25 24 25   BUN 8 9 8   GLU 86 83 75     No results for input(s): "ALB" in the last 72 hours.    Invalid input(s): "ALKP", "SGOT", "SGPT", "TBIL", "DBIL", "TPRO"  No results for input(s): "PT", "INR", "PTT" in the last 72 hours.      Radiology Review:  CT Abdomen Pelvis With IV Contrast NO Oral Contrast   Final Result      Colonic wall thickening and pericolonic inflammatory stranding extending from the distal transverse colon through the descending colon, sigmoid colon, and rectum - compatible with acute colitis in this patient with history of ulcerative colitis.      No evidence of perforation or " abscess.      Bilateral nonobstructing nephrolithiasis.      Small cluster of reticulonodular densities dependent right lower lobe, likely infectious/inflammatory nature.  Please correlate with pulmonary symptoms.         Electronically signed by: Rickie Brown   Date:    05/03/2024   Time:    17:05            IMPRESSION / RECOMMENDATIONS:  Ulcerative colitis  -continue solumedrol and omvoh  -gi to continue to follow.      Enoc Kebede  5/6/2024  8:49 AM

## 2024-05-06 NOTE — RESPIRATORY THERAPY
05/05/24 2005   Patient Assessment/Suction   Level of Consciousness (AVPU) alert   Respiratory Effort Normal;Unlabored   PRE-TX-O2   Device (Oxygen Therapy) room air   SpO2 99 %   Pulse Oximetry Type Intermittent   $ Pulse Oximetry - Multiple Charge Pulse Oximetry - Multiple   Pulse 60   Resp 16   Incentive Spirometer   $ Incentive Spirometer Charges done with encouragement;proper technique demonstrated   Incentive Spirometer Predicted Level (mL) 2250   Administration (IS) instruction provided, follow-up   Number of Repetitions (IS) 5   Level Incentive Spirometer (mL) 1300   Patient Tolerance (IS) good;no adverse signs/symptoms present   Education   $ Education Other (see comment);15 min  (IS)

## 2024-05-06 NOTE — PROGRESS NOTES
[facility] Medicine  Progress Note    Patient Name: Jacoby Jean-Baptiste  MRN: [id number]  Patient Class: IP- Inpatient   Admission Date: 5/3/2024  Length of Stay: 1 days  Attending Physician: Butch Rodriguez MD  Primary Care Provider: uHnter Aguilar III, MD        Subjective:     Principal Problem:Ulcerative colitis        HPI:  49 year old pt getting admitted with ulcerative colitis flare  Pt was admitted with same c/o this year(Feb/March)  Per Pt his last intake of steroids PO was few days ago  He was doing fine and started having profuse loose stools again  Noticed blood and pus in stools  Stools were not smelly as per pt(Has H/o C diff)  Symptoms got worse and pt came to hospital and got admitted   His last colonoscopy was in March this year     Overview/Hospital Course:  No notes on file    Interval History:  Patient seen and examined. Overnight notes reviewed.  Patient reports generalized abdominal pain and several episodes of diarrhea approximately 7 episodes overnight and 3 episodes this morning.  Bentyl and Lomotil ordered. He denies chest pain or shortness of breath.  GI following. Solu-Medrol ordered.     Review of Systems   Respiratory:  Negative for shortness of breath.    Cardiovascular:  Negative for chest pain and palpitations.   Gastrointestinal:  Positive for abdominal pain and diarrhea.     Objective:     Vital Signs (Most Recent):  Temp: 97.9 °F (36.6 °C) (05/06/24 1100)  Pulse: 76 (05/06/24 1100)  Resp: 19 (05/06/24 1100)  BP: (!) 147/79 (05/06/24 1100)  SpO2: 95 % (05/06/24 1100) Vital Signs (24h Range):  Temp:  [97.8 °F (36.6 °C)-98.5 °F (36.9 °C)] 97.9 °F (36.6 °C)  Pulse:  [60-81] 76  Resp:  [16-19] 19  SpO2:  [95 %-99 %] 95 %  BP: (106-147)/(56-81) 147/79     Weight: 131.1 kg (289 lb 0.4 oz)  Body mass index is 48.1 kg/m².    Intake/Output Summary (Last 24 hours) at 5/6/2024 1559  Last data filed at 5/6/2024 0841  Gross per 24 hour   Intake 440 ml   Output --   Net  440 ml         Physical Exam  Vitals and nursing note reviewed.   Constitutional:       General: He is not in acute distress.     Appearance: Normal appearance. He is obese. He is ill-appearing (chronically).   HENT:      Head: Normocephalic and atraumatic.      Mouth/Throat:      Mouth: Mucous membranes are moist.      Pharynx: Oropharynx is clear.   Eyes:      Extraocular Movements: Extraocular movements intact.      Pupils: Pupils are equal, round, and reactive to light.   Cardiovascular:      Rate and Rhythm: Normal rate and regular rhythm.      Pulses: Normal pulses.      Heart sounds: Normal heart sounds.   Pulmonary:      Effort: Pulmonary effort is normal.      Breath sounds: Normal breath sounds.   Abdominal:      General: Abdomen is flat. Bowel sounds are normal.      Palpations: Abdomen is soft.      Tenderness: There is generalized abdominal tenderness. There is no guarding or rebound.   Musculoskeletal:      Cervical back: Normal range of motion and neck supple.   Skin:     General: Skin is warm.      Capillary Refill: Capillary refill takes less than 2 seconds.   Neurological:      General: No focal deficit present.      Mental Status: He is alert and oriented to person, place, and time. Mental status is at baseline.   Psychiatric:         Mood and Affect: Mood normal.         Behavior: Behavior normal.             Significant Labs: All pertinent labs within the past 24 hours have been reviewed.  CBC:   Recent Labs   Lab 05/05/24  0630 05/06/24  0636   WBC 14.65* 10.42   HGB 11.4* 11.0*   HCT 37.6* 36.9*    350     CMP:   Recent Labs   Lab 05/05/24  0630 05/06/24  0636   * 135*   K 3.9 3.9    102   CO2 24 25   GLU 83 75   BUN 9 8   CREATININE 0.9 0.9   CALCIUM 8.9 8.7   PROT 7.3 6.9   ALBUMIN 3.6 3.5   BILITOT 0.3 0.3   ALKPHOS 66 55   AST 7* 9*   ALT 10 11   ANIONGAP 8 8       Significant Imaging: I have reviewed all pertinent imaging results/findings within the past 24  "hours.    Assessment/Plan:      * Ulcerative colitis  Acute:  U colitis flare  Start iv steroids/iv abx: abx discontinued   Obtain  stool to r/o C diff   Maintain cholestyramine PO  Stop Metformin/sertraline in view with loose stools  Stop PPI in view with C diff H/o   GI consulted:  Appreciate recommendations        Long term systemic steroid user  Last PO Budesonide intake was few weeks ago      Atrial fibrillation, unspecified type  Per chart review    PUD (peptic ulcer disease)  Maintain pepcid      History of Clostridioides difficile colitis  Aware   C diff stool study negative      BMI 45.0-49.9, adult  Body mass index is 48.1 kg/m². Morbid obesity complicates all aspects of disease management from diagnostic modalities to treatment.     Type 2 diabetes mellitus without complication, without long-term current use of insulin  Patient's FSGs are controlled on current medication regimen.  Last A1c reviewed-   Lab Results   Component Value Date    HGBA1C 5.7 03/16/2024     Most recent fingerstick glucose reviewed- No results for input(s): "POCTGLUCOSE" in the last 24 hours.  Current correctional scale  Medium  Maintain anti-hyperglycemic dose as follows-   Antihyperglycemics (From admission, onward)      Start     Stop Route Frequency Ordered    05/03/24 2034  insulin aspart U-100 pen 0-10 Units         -- SubQ Before meals & nightly PRN 05/03/24 1934          Hold Oral hypoglycemics while patient is in the hospital.      VTE Risk Mitigation (From admission, onward)           Ordered     enoxaparin injection 40 mg  Every 12 hours         05/03/24 1937     IP VTE HIGH RISK PATIENT  Once         05/03/24 1934     Place sequential compression device  Until discontinued         05/03/24 1934                    Discharge Planning   GERARDO: 5/8/2024     Code Status: Full Code   Is the patient medically ready for discharge?:     Reason for patient still in hospital (select all that apply): Patient trending condition, " Laboratory test, Treatment, Consult recommendations, and Pending disposition  Discharge Plan A: Home                  Gely Mccoy PA-C  Department of Hospital Medicine   [facility]

## 2024-05-06 NOTE — ASSESSMENT & PLAN NOTE
Acute:  U colitis flare  Start iv steroids/iv abx: abx discontinued   Obtain  stool to r/o C diff   Maintain cholestyramine PO  Stop Metformin/sertraline in view with loose stools  Stop PPI in view with C diff H/o   GI consulted:  Appreciate recommendations

## 2024-05-07 LAB
ALBUMIN SERPL BCP-MCNC: 3.8 G/DL (ref 3.5–5.2)
ALP SERPL-CCNC: 54 U/L (ref 55–135)
ALT SERPL W/O P-5'-P-CCNC: 10 U/L (ref 10–44)
ANION GAP SERPL CALC-SCNC: 7 MMOL/L (ref 8–16)
AST SERPL-CCNC: 8 U/L (ref 10–40)
BASOPHILS # BLD AUTO: 0.02 K/UL (ref 0–0.2)
BASOPHILS NFR BLD: 0.2 % (ref 0–1.9)
BILIRUB SERPL-MCNC: 0.3 MG/DL (ref 0.1–1)
BUN SERPL-MCNC: 10 MG/DL (ref 6–20)
CALCIUM SERPL-MCNC: 9.6 MG/DL (ref 8.7–10.5)
CHLORIDE SERPL-SCNC: 101 MMOL/L (ref 95–110)
CO2 SERPL-SCNC: 28 MMOL/L (ref 23–29)
CREAT SERPL-MCNC: 1 MG/DL (ref 0.5–1.4)
DIFFERENTIAL METHOD BLD: ABNORMAL
EOSINOPHIL # BLD AUTO: 0.1 K/UL (ref 0–0.5)
EOSINOPHIL NFR BLD: 1.5 % (ref 0–8)
ERYTHROCYTE [DISTWIDTH] IN BLOOD BY AUTOMATED COUNT: 16.6 % (ref 11.5–14.5)
EST. GFR  (NO RACE VARIABLE): >60 ML/MIN/1.73 M^2
GLUCOSE SERPL-MCNC: 128 MG/DL (ref 70–110)
GLUCOSE SERPL-MCNC: 135 MG/DL (ref 70–110)
GLUCOSE SERPL-MCNC: 152 MG/DL (ref 70–110)
GLUCOSE SERPL-MCNC: 80 MG/DL (ref 70–110)
GLUCOSE SERPL-MCNC: 88 MG/DL (ref 70–110)
HCT VFR BLD AUTO: 37.6 % (ref 40–54)
HGB BLD-MCNC: 11.5 G/DL (ref 14–18)
IMM GRANULOCYTES # BLD AUTO: 0.07 K/UL (ref 0–0.04)
IMM GRANULOCYTES NFR BLD AUTO: 0.8 % (ref 0–0.5)
LYMPHOCYTES # BLD AUTO: 2 K/UL (ref 1–4.8)
LYMPHOCYTES NFR BLD: 23.8 % (ref 18–48)
MAGNESIUM SERPL-MCNC: 1.6 MG/DL (ref 1.6–2.6)
MCH RBC QN AUTO: 30.8 PG (ref 27–31)
MCHC RBC AUTO-ENTMCNC: 30.6 G/DL (ref 32–36)
MCV RBC AUTO: 101 FL (ref 82–98)
MONOCYTES # BLD AUTO: 1.3 K/UL (ref 0.3–1)
MONOCYTES NFR BLD: 14.9 % (ref 4–15)
NEUTROPHILS # BLD AUTO: 5 K/UL (ref 1.8–7.7)
NEUTROPHILS NFR BLD: 58.8 % (ref 38–73)
NRBC BLD-RTO: 0 /100 WBC
PLATELET # BLD AUTO: 320 K/UL (ref 150–450)
PMV BLD AUTO: 9.5 FL (ref 9.2–12.9)
POTASSIUM SERPL-SCNC: 4.2 MMOL/L (ref 3.5–5.1)
PROT SERPL-MCNC: 7.4 G/DL (ref 6–8.4)
RBC # BLD AUTO: 3.73 M/UL (ref 4.6–6.2)
SODIUM SERPL-SCNC: 136 MMOL/L (ref 136–145)
WBC # BLD AUTO: 8.53 K/UL (ref 3.9–12.7)

## 2024-05-07 PROCEDURE — 83735 ASSAY OF MAGNESIUM: CPT | Performed by: INTERNAL MEDICINE

## 2024-05-07 PROCEDURE — 25000003 PHARM REV CODE 250

## 2024-05-07 PROCEDURE — 80053 COMPREHEN METABOLIC PANEL: CPT | Performed by: INTERNAL MEDICINE

## 2024-05-07 PROCEDURE — 25000003 PHARM REV CODE 250: Performed by: STUDENT IN AN ORGANIZED HEALTH CARE EDUCATION/TRAINING PROGRAM

## 2024-05-07 PROCEDURE — 94760 N-INVAS EAR/PLS OXIMETRY 1: CPT

## 2024-05-07 PROCEDURE — 99900035 HC TECH TIME PER 15 MIN (STAT)

## 2024-05-07 PROCEDURE — 85025 COMPLETE CBC W/AUTO DIFF WBC: CPT | Performed by: INTERNAL MEDICINE

## 2024-05-07 PROCEDURE — 94761 N-INVAS EAR/PLS OXIMETRY MLT: CPT

## 2024-05-07 PROCEDURE — 25000003 PHARM REV CODE 250: Performed by: INTERNAL MEDICINE

## 2024-05-07 PROCEDURE — 63600175 PHARM REV CODE 636 W HCPCS: Performed by: INTERNAL MEDICINE

## 2024-05-07 PROCEDURE — 12000002 HC ACUTE/MED SURGE SEMI-PRIVATE ROOM

## 2024-05-07 PROCEDURE — 36415 COLL VENOUS BLD VENIPUNCTURE: CPT | Performed by: INTERNAL MEDICINE

## 2024-05-07 PROCEDURE — 63600175 PHARM REV CODE 636 W HCPCS

## 2024-05-07 RX ADMIN — BUSPIRONE HYDROCHLORIDE 7.5 MG: 5 TABLET ORAL at 08:05

## 2024-05-07 RX ADMIN — CHOLESTYRAMINE LIGHT 4 G: 4 POWDER, FOR SUSPENSION ORAL at 10:05

## 2024-05-07 RX ADMIN — METHYLPREDNISOLONE SODIUM SUCCINATE 60 MG: 125 INJECTION, POWDER, FOR SOLUTION INTRAMUSCULAR; INTRAVENOUS at 09:05

## 2024-05-07 RX ADMIN — ENOXAPARIN SODIUM 40 MG: 40 INJECTION SUBCUTANEOUS at 10:05

## 2024-05-07 RX ADMIN — DIPHENOXYLATE HYDROCHLORIDE AND ATROPINE SULFATE 1 TABLET: 2.5; .025 TABLET ORAL at 10:05

## 2024-05-07 RX ADMIN — DICYCLOMINE HYDROCHLORIDE 20 MG: 10 CAPSULE ORAL at 04:05

## 2024-05-07 RX ADMIN — DICYCLOMINE HYDROCHLORIDE 20 MG: 10 CAPSULE ORAL at 02:05

## 2024-05-07 RX ADMIN — DICYCLOMINE HYDROCHLORIDE 20 MG: 10 CAPSULE ORAL at 05:05

## 2024-05-07 RX ADMIN — ONDANSETRON 4 MG: 2 INJECTION INTRAMUSCULAR; INTRAVENOUS at 08:05

## 2024-05-07 RX ADMIN — BUSPIRONE HYDROCHLORIDE 7.5 MG: 5 TABLET ORAL at 02:05

## 2024-05-07 RX ADMIN — ENOXAPARIN SODIUM 40 MG: 40 INJECTION SUBCUTANEOUS at 08:05

## 2024-05-07 RX ADMIN — FAMOTIDINE 20 MG: 20 TABLET ORAL at 10:05

## 2024-05-07 RX ADMIN — DIPHENOXYLATE HYDROCHLORIDE AND ATROPINE SULFATE 1 TABLET: 2.5; .025 TABLET ORAL at 08:05

## 2024-05-07 RX ADMIN — CHOLESTYRAMINE LIGHT 4 G: 4 POWDER, FOR SUSPENSION ORAL at 02:05

## 2024-05-07 RX ADMIN — FAMOTIDINE 20 MG: 20 TABLET ORAL at 08:05

## 2024-05-07 RX ADMIN — OXYCODONE HYDROCHLORIDE AND ACETAMINOPHEN 1 TABLET: 10; 325 TABLET ORAL at 08:05

## 2024-05-07 RX ADMIN — CHOLESTYRAMINE LIGHT 4 G: 4 POWDER, FOR SUSPENSION ORAL at 08:05

## 2024-05-07 RX ADMIN — BUSPIRONE HYDROCHLORIDE 7.5 MG: 5 TABLET ORAL at 10:05

## 2024-05-07 RX ADMIN — SERTRALINE HYDROCHLORIDE 100 MG: 50 TABLET ORAL at 10:05

## 2024-05-07 RX ADMIN — OXYCODONE HYDROCHLORIDE AND ACETAMINOPHEN 1 TABLET: 10; 325 TABLET ORAL at 04:05

## 2024-05-07 RX ADMIN — Medication 6 MG: at 08:05

## 2024-05-07 RX ADMIN — METOPROLOL SUCCINATE 50 MG: 50 TABLET, FILM COATED, EXTENDED RELEASE ORAL at 10:05

## 2024-05-07 RX ADMIN — OXYCODONE HYDROCHLORIDE AND ACETAMINOPHEN 1 TABLET: 10; 325 TABLET ORAL at 02:05

## 2024-05-07 RX ADMIN — DICYCLOMINE HYDROCHLORIDE 20 MG: 10 CAPSULE ORAL at 08:05

## 2024-05-07 NOTE — HOSPITAL COURSE
Patient was admitted to the hospital with ulcerative colitis flare.  The patient was monitored closely throughout his hospital stay.  Patient was started on IV steroids and antibiotics.  GI was consulted.  C diff stool study was obtained and was negative.  Stool cultures were obtained with no salmonella, shigella, vibrio, camylobacter or E. Coli isolated.  Antibiotics were discontinued.   Lomotil and Bentyl were ordered and the frequency of his diarrhea stools improved.  Gastroenterology was consulted for further recommendations which included continue lomotil and questran.  Creon was added as an antidiarrheal.  He was transitioned to oral methylprednisolone from IV.  Future considerations include initiation of Omvoh and adding budesonide so that the oral steroid can be weaned.  Avoid abx as able given hx recurrent c.diff.  These recommendations were discussed with the patient and may be addressed futher at his follow up with GI.  He was cleared for discharge from the standpoint of gastroenterology. On the date of discharge, he was seen and examined and suitable for dc.  Discharge instructions were reviewed and return precautions discussed.  He was discharged home in stable condition.

## 2024-05-07 NOTE — PROGRESS NOTES
[facility] Medicine  Progress Note    Patient Name: Jacoby Jean-Baptiste  MRN: [id number]  Patient Class: IP- Inpatient   Admission Date: 5/3/2024  Length of Stay: 2 days  Attending Physician: Butch Rodriguez MD  Primary Care Provider: Hunter Aguilar III, MD        Subjective:     Principal Problem:Ulcerative colitis        HPI:  49 year old pt getting admitted with ulcerative colitis flare  Pt was admitted with same c/o this year(Feb/March)  Per Pt his last intake of steroids PO was few days ago  He was doing fine and started having profuse loose stools again  Noticed blood and pus in stools  Stools were not smelly as per pt(Has H/o C diff)  Symptoms got worse and pt came to hospital and got admitted   His last colonoscopy was in March this year     Overview/Hospital Course:  No notes on file    Interval History:  Patient seen and examined. Overnight notes reviewed.  Patient reports generalized abdominal pain and several episodes of diarrhea. He reports improvement in diarrhea overnight but reports several episodes of diarrhea this am. Reports improvement in abdominal pain with Bentyl. Bentyl and Lomotil ordered. He denies chest pain or shortness of breath. GI following. Solu-Medrol ordered.     Review of Systems   Respiratory:  Negative for shortness of breath.    Cardiovascular:  Negative for chest pain and palpitations.   Gastrointestinal:  Positive for abdominal pain and diarrhea.     Objective:     Vital Signs (Most Recent):  Temp: 98.3 °F (36.8 °C) (05/07/24 1611)  Pulse: 72 (05/07/24 1611)  Resp: 18 (05/07/24 1611)  BP: 117/81 (05/07/24 1611)  SpO2: (!) 94 % (05/07/24 1611) Vital Signs (24h Range):  Temp:  [97.6 °F (36.4 °C)-98.6 °F (37 °C)] 98.3 °F (36.8 °C)  Pulse:  [66-75] 72  Resp:  [16-18] 18  SpO2:  [94 %-98 %] 94 %  BP: (117-142)/(61-83) 117/81     Weight: 131.1 kg (289 lb 0.4 oz)  Body mass index is 48.1 kg/m².    Intake/Output Summary (Last 24 hours) at 5/7/2024 1700  Last  data filed at 5/7/2024 0547  Gross per 24 hour   Intake 720 ml   Output --   Net 720 ml         Physical Exam  Vitals and nursing note reviewed.   Constitutional:       General: He is not in acute distress.     Appearance: Normal appearance. He is obese. He is ill-appearing (chronically).   HENT:      Head: Normocephalic and atraumatic.      Mouth/Throat:      Mouth: Mucous membranes are moist.      Pharynx: Oropharynx is clear.   Eyes:      Extraocular Movements: Extraocular movements intact.      Pupils: Pupils are equal, round, and reactive to light.   Cardiovascular:      Rate and Rhythm: Normal rate and regular rhythm.      Pulses: Normal pulses.      Heart sounds: Normal heart sounds.   Pulmonary:      Effort: Pulmonary effort is normal.      Breath sounds: Normal breath sounds.   Abdominal:      General: Abdomen is flat. Bowel sounds are normal.      Palpations: Abdomen is soft.      Tenderness: There is generalized abdominal tenderness. There is no guarding or rebound.   Musculoskeletal:      Cervical back: Normal range of motion and neck supple.   Skin:     General: Skin is warm.      Capillary Refill: Capillary refill takes less than 2 seconds.   Neurological:      General: No focal deficit present.      Mental Status: He is alert and oriented to person, place, and time. Mental status is at baseline.   Psychiatric:         Mood and Affect: Mood normal.         Behavior: Behavior normal.             Significant Labs: All pertinent labs within the past 24 hours have been reviewed.  CBC:   Recent Labs   Lab 05/06/24  0636 05/07/24  0532   WBC 10.42 8.53   HGB 11.0* 11.5*   HCT 36.9* 37.6*    320     CMP:   Recent Labs   Lab 05/06/24  0636 05/07/24  0532   * 136   K 3.9 4.2    101   CO2 25 28   GLU 75 80   BUN 8 10   CREATININE 0.9 1.0   CALCIUM 8.7 9.6   PROT 6.9 7.4   ALBUMIN 3.5 3.8   BILITOT 0.3 0.3   ALKPHOS 55 54*   AST 9* 8*   ALT 11 10   ANIONGAP 8 7*       Significant Imaging: I  "have reviewed all pertinent imaging results/findings within the past 24 hours.    Assessment/Plan:      * Ulcerative colitis  Acute:  U colitis flare  Start iv steroids/iv abx: abx discontinued   Obtain  stool to r/o C diff   Maintain cholestyramine PO  Stop Metformin/sertraline in view with loose stools  Stop PPI in view with C diff H/o   GI consulted:  Appreciate recommendations        Long term systemic steroid user  Last PO Budesonide intake was few weeks ago      Atrial fibrillation, unspecified type  Per chart review    PUD (peptic ulcer disease)  Maintain pepcid      History of Clostridioides difficile colitis  Aware   C diff stool study negative      BMI 45.0-49.9, adult  Body mass index is 48.1 kg/m². Morbid obesity complicates all aspects of disease management from diagnostic modalities to treatment.     Type 2 diabetes mellitus without complication, without long-term current use of insulin  Patient's FSGs are controlled on current medication regimen.  Last A1c reviewed-   Lab Results   Component Value Date    HGBA1C 5.7 03/16/2024     Most recent fingerstick glucose reviewed- No results for input(s): "POCTGLUCOSE" in the last 24 hours.  Current correctional scale  Medium  Maintain anti-hyperglycemic dose as follows-   Antihyperglycemics (From admission, onward)      Start     Stop Route Frequency Ordered    05/03/24 2034  insulin aspart U-100 pen 0-10 Units         -- SubQ Before meals & nightly PRN 05/03/24 1934          Hold Oral hypoglycemics while patient is in the hospital.      VTE Risk Mitigation (From admission, onward)           Ordered     enoxaparin injection 40 mg  Every 12 hours         05/03/24 1937     IP VTE HIGH RISK PATIENT  Once         05/03/24 1934     Place sequential compression device  Until discontinued         05/03/24 1934                    Discharge Planning   GERARDO: 5/10/2024     Code Status: Full Code   Is the patient medically ready for discharge?:     Reason for patient " still in hospital (select all that apply): Patient trending condition, Laboratory test, Treatment, Consult recommendations, and Pending disposition  Discharge Plan A: Home                  Gely Mccoy PA-C  Department of Hospital Medicine   [facility]

## 2024-05-07 NOTE — PROGRESS NOTES
Patient Name: Jacoby Jean-Baptiste  Patient MRN: [id number]  Patient : 1975    Admit Date: 5/3/2024  Service date: 2024    Reason for Consult: diarrhea     PCP: Hunter Aguilar III, MD    S: non-bloody diarrhea; mild imrpoved since starting lomotil. No f/c.     Inpatient Medications:   busPIRone  7.5 mg Oral TID    cholestyramine-aspartame  1 packet Oral TID    dicyclomine  20 mg Oral QID (AC & HS)    diphenoxylate-atropine 2.5-0.025 mg  1 tablet Oral BID    enoxparin  40 mg Subcutaneous Q12H (prophylaxis, 900/)    famotidine  20 mg Oral BID    methylPREDNISolone sodium succinate injection  60 mg Intravenous Daily    metoprolol succinate  50 mg Oral Daily    sertraline  100 mg Oral Daily       Current Facility-Administered Medications:     acetaminophen, 650 mg, Oral, Q8H PRN    acetaminophen, 650 mg, Oral, Q4H PRN    aluminum-magnesium hydroxide-simethicone, 30 mL, Oral, QID PRN    dextrose 50%, 12.5 g, Intravenous, PRN    dextrose 50%, 25 g, Intravenous, PRN    glucagon (human recombinant), 1 mg, Intramuscular, PRN    glucose, 16 g, Oral, PRN    glucose, 24 g, Oral, PRN    insulin aspart U-100, 0-10 Units, Subcutaneous, QID (AC + HS) PRN    LORazepam, 0.5 mg, Oral, Q6H PRN    magnesium oxide, 800 mg, Oral, PRN    magnesium oxide, 800 mg, Oral, PRN    melatonin, 6 mg, Oral, Nightly PRN    ondansetron, 4 mg, Intravenous, Q6H PRN    oxyCODONE-acetaminophen, 1 tablet, Oral, Q4H PRN    oxyCODONE-acetaminophen, 1 tablet, Oral, Q4H PRN    potassium bicarbonate, 35 mEq, Oral, PRN    potassium bicarbonate, 50 mEq, Oral, PRN    potassium bicarbonate, 60 mEq, Oral, PRN    potassium, sodium phosphates, 2 packet, Oral, PRN    potassium, sodium phosphates, 2 packet, Oral, PRN    potassium, sodium phosphates, 2 packet, Oral, PRN    zolpidem, 10 mg, Oral, Nightly PRN    Review of Systems:  A 10 point review of systems was performed and was normal, except as mentioned in the HPI, including constitutional, HEENT, heme,  "lymph, cardiovascular, respiratory, gastrointestinal, genitourinary, neurologic, endocrine, psychiatric and musculoskeletal.      OBJECTIVE:    Physical Exam:  24 Hour Vital Sign Ranges: Temp:  [97.6 °F (36.4 °C)-98.6 °F (37 °C)] 98.1 °F (36.7 °C)  Pulse:  [65-75] 74  Resp:  [16-18] 16  SpO2:  [96 %-98 %] 96 %  BP: (120-142)/(61-83) 133/61  Most recent vitals: /61   Pulse 74   Temp 98.1 °F (36.7 °C) (Oral)   Resp 16   Ht 5' 5" (1.651 m)   Wt 131.1 kg (289 lb 0.4 oz)   SpO2 96%   BMI 48.10 kg/m²    GEN: well-developed, well-nourished, awake and alert, non-toxic appearing adult  HEENT: PERRL, sclera anicteric, oral mucosa pink and moist without lesion  NECK: trachea midline; Good ROM  CV: regular rate and rhythm, no murmurs or gallops  RESP: clear to auscultation bilaterally, no wheezes, rhonci or rales  ABD: soft, non-tender, non-distended, normal bowel sounds  EXT: no swelling or edema, 2+ pulses distally  SKIN: no rashes or jaundice  PSYCH: normal affect    Labs:   Recent Labs     05/05/24  0630 05/06/24  0636 05/07/24  0532   WBC 14.65* 10.42 8.53   * 103* 101*    350 320     Recent Labs     05/05/24  0630 05/06/24  0636 05/07/24  0532   * 135* 136   K 3.9 3.9 4.2    102 101   CO2 24 25 28   BUN 9 8 10   GLU 83 75 80     No results for input(s): "ALB" in the last 72 hours.    Invalid input(s): "ALKP", "SGOT", "SGPT", "TBIL", "DBIL", "TPRO"  No results for input(s): "PT", "INR", "PTT" in the last 72 hours.      Radiology Review:  CT Abdomen Pelvis With IV Contrast NO Oral Contrast   Final Result      Colonic wall thickening and pericolonic inflammatory stranding extending from the distal transverse colon through the descending colon, sigmoid colon, and rectum - compatible with acute colitis in this patient with history of ulcerative colitis.      No evidence of perforation or abscess.      Bilateral nonobstructing nephrolithiasis.      Small cluster of reticulonodular " densities dependent right lower lobe, likely infectious/inflammatory nature.  Please correlate with pulmonary symptoms.         Electronically signed by: Rickie Brown   Date:    05/03/2024   Time:    17:05            IMPRESSION / RECOMMENDATIONS:  50 y/o M w/ho HLD, DM on ozempic, HTN, LOLIS, umbilical hernia repair, tobacco use, C. diff s/p Vanco / dificid / FMT / rebyota, ulcerative colitis (remicade-fungal infection; Entyvio-non-responder; Omvoh started 4/'24) presents for evaluation of abdominal cramping/diarrhea in setting of know UC. CT w/ colitis but fortunately C. Diff negative.     -Continue Omvoh as outpatient as may take 8-12 weeks to take full effect  -Continue Lomotil / questran  -Switch IV steroids to PO  -Consider adding budesonide in next few weeks so prednisone can be weaned  -If refractory to omvoh, consider sterlara  -Avoid any abx unless absolutely necessary given recurrent C. Diff in past    Shan Jean III  5/7/2024  4:09 PM

## 2024-05-07 NOTE — SUBJECTIVE & OBJECTIVE
Interval History:  Patient seen and examined. Overnight notes reviewed.  Patient reports generalized abdominal pain and several episodes of diarrhea. He reports improvement in diarrhea overnight but reports several episodes of diarrhea this am. Reports improvement in abdominal pain with Bentyl. Bentyl and Lomotil ordered. He denies chest pain or shortness of breath. GI following. Solu-Medrol ordered.     Review of Systems   Respiratory:  Negative for shortness of breath.    Cardiovascular:  Negative for chest pain and palpitations.   Gastrointestinal:  Positive for abdominal pain and diarrhea.     Objective:     Vital Signs (Most Recent):  Temp: 98.3 °F (36.8 °C) (05/07/24 1611)  Pulse: 72 (05/07/24 1611)  Resp: 18 (05/07/24 1611)  BP: 117/81 (05/07/24 1611)  SpO2: (!) 94 % (05/07/24 1611) Vital Signs (24h Range):  Temp:  [97.6 °F (36.4 °C)-98.6 °F (37 °C)] 98.3 °F (36.8 °C)  Pulse:  [66-75] 72  Resp:  [16-18] 18  SpO2:  [94 %-98 %] 94 %  BP: (117-142)/(61-83) 117/81     Weight: 131.1 kg (289 lb 0.4 oz)  Body mass index is 48.1 kg/m².    Intake/Output Summary (Last 24 hours) at 5/7/2024 1700  Last data filed at 5/7/2024 0547  Gross per 24 hour   Intake 720 ml   Output --   Net 720 ml         Physical Exam  Vitals and nursing note reviewed.   Constitutional:       General: He is not in acute distress.     Appearance: Normal appearance. He is obese. He is ill-appearing (chronically).   HENT:      Head: Normocephalic and atraumatic.      Mouth/Throat:      Mouth: Mucous membranes are moist.      Pharynx: Oropharynx is clear.   Eyes:      Extraocular Movements: Extraocular movements intact.      Pupils: Pupils are equal, round, and reactive to light.   Cardiovascular:      Rate and Rhythm: Normal rate and regular rhythm.      Pulses: Normal pulses.      Heart sounds: Normal heart sounds.   Pulmonary:      Effort: Pulmonary effort is normal.      Breath sounds: Normal breath sounds.   Abdominal:      General: Abdomen is  flat. Bowel sounds are normal.      Palpations: Abdomen is soft.      Tenderness: There is generalized abdominal tenderness. There is no guarding or rebound.   Musculoskeletal:      Cervical back: Normal range of motion and neck supple.   Skin:     General: Skin is warm.      Capillary Refill: Capillary refill takes less than 2 seconds.   Neurological:      General: No focal deficit present.      Mental Status: He is alert and oriented to person, place, and time. Mental status is at baseline.   Psychiatric:         Mood and Affect: Mood normal.         Behavior: Behavior normal.             Significant Labs: All pertinent labs within the past 24 hours have been reviewed.  CBC:   Recent Labs   Lab 05/06/24  0636 05/07/24  0532   WBC 10.42 8.53   HGB 11.0* 11.5*   HCT 36.9* 37.6*    320     CMP:   Recent Labs   Lab 05/06/24  0636 05/07/24  0532   * 136   K 3.9 4.2    101   CO2 25 28   GLU 75 80   BUN 8 10   CREATININE 0.9 1.0   CALCIUM 8.7 9.6   PROT 6.9 7.4   ALBUMIN 3.5 3.8   BILITOT 0.3 0.3   ALKPHOS 55 54*   AST 9* 8*   ALT 11 10   ANIONGAP 8 7*       Significant Imaging: I have reviewed all pertinent imaging results/findings within the past 24 hours.

## 2024-05-07 NOTE — CARE UPDATE
05/06/24 2000   Patient Assessment/Suction   Level of Consciousness (AVPU) alert   Respiratory Effort Normal;Unlabored   Expansion/Accessory Muscles/Retractions no retractions;no use of accessory muscles   CONRADO Breath Sounds clear   LLL Breath Sounds diminished   RUL Breath Sounds clear   RML Breath Sounds clear;diminished   RLL Breath Sounds diminished   Rhythm/Pattern, Respiratory pattern regular;unlabored   Cough Frequency no cough   Incentive Spirometer   $ Incentive Spirometer Charges done with encouragement   Incentive Spirometer Predicted Level (mL) 2250   Administration (IS) self-administered   Number of Repetitions (IS) 10   Level Incentive Spirometer (mL) 1200   Patient Tolerance (IS) good;no adverse signs/symptoms present

## 2024-05-08 PROBLEM — I48.91 ATRIAL FIBRILLATION, UNSPECIFIED TYPE: Status: RESOLVED | Noted: 2024-01-09 | Resolved: 2024-05-08

## 2024-05-08 LAB
ALBUMIN SERPL BCP-MCNC: 3.3 G/DL (ref 3.5–5.2)
ALP SERPL-CCNC: 46 U/L (ref 55–135)
ALT SERPL W/O P-5'-P-CCNC: 13 U/L (ref 10–44)
ANION GAP SERPL CALC-SCNC: 6 MMOL/L (ref 8–16)
AST SERPL-CCNC: 11 U/L (ref 10–40)
BACTERIA STL CULT: NORMAL
BACTERIA STL CULT: NORMAL
BASOPHILS # BLD AUTO: 0.03 K/UL (ref 0–0.2)
BASOPHILS NFR BLD: 0.3 % (ref 0–1.9)
BILIRUB SERPL-MCNC: 0.3 MG/DL (ref 0.1–1)
BUN SERPL-MCNC: 14 MG/DL (ref 6–20)
CALCIUM SERPL-MCNC: 9.1 MG/DL (ref 8.7–10.5)
CHLORIDE SERPL-SCNC: 101 MMOL/L (ref 95–110)
CO2 SERPL-SCNC: 30 MMOL/L (ref 23–29)
CREAT SERPL-MCNC: 1 MG/DL (ref 0.5–1.4)
DIFFERENTIAL METHOD BLD: ABNORMAL
EOSINOPHIL # BLD AUTO: 0.2 K/UL (ref 0–0.5)
EOSINOPHIL NFR BLD: 2 % (ref 0–8)
ERYTHROCYTE [DISTWIDTH] IN BLOOD BY AUTOMATED COUNT: 16.4 % (ref 11.5–14.5)
EST. GFR  (NO RACE VARIABLE): >60 ML/MIN/1.73 M^2
GLUCOSE SERPL-MCNC: 104 MG/DL (ref 70–110)
GLUCOSE SERPL-MCNC: 112 MG/DL (ref 70–110)
GLUCOSE SERPL-MCNC: 153 MG/DL (ref 70–110)
GLUCOSE SERPL-MCNC: 91 MG/DL (ref 70–110)
HCT VFR BLD AUTO: 33.9 % (ref 40–54)
HGB BLD-MCNC: 10.5 G/DL (ref 14–18)
IMM GRANULOCYTES # BLD AUTO: 0.07 K/UL (ref 0–0.04)
IMM GRANULOCYTES NFR BLD AUTO: 0.8 % (ref 0–0.5)
LYMPHOCYTES # BLD AUTO: 2 K/UL (ref 1–4.8)
LYMPHOCYTES NFR BLD: 23.1 % (ref 18–48)
MAGNESIUM SERPL-MCNC: 1.5 MG/DL (ref 1.6–2.6)
MCH RBC QN AUTO: 30.7 PG (ref 27–31)
MCHC RBC AUTO-ENTMCNC: 31 G/DL (ref 32–36)
MCV RBC AUTO: 99 FL (ref 82–98)
MONOCYTES # BLD AUTO: 1.3 K/UL (ref 0.3–1)
MONOCYTES NFR BLD: 15.2 % (ref 4–15)
NEUTROPHILS # BLD AUTO: 5.2 K/UL (ref 1.8–7.7)
NEUTROPHILS NFR BLD: 58.6 % (ref 38–73)
NRBC BLD-RTO: 0 /100 WBC
PLATELET # BLD AUTO: 313 K/UL (ref 150–450)
PMV BLD AUTO: 9.4 FL (ref 9.2–12.9)
POTASSIUM SERPL-SCNC: 3.9 MMOL/L (ref 3.5–5.1)
PROT SERPL-MCNC: 6.4 G/DL (ref 6–8.4)
RBC # BLD AUTO: 3.42 M/UL (ref 4.6–6.2)
SODIUM SERPL-SCNC: 137 MMOL/L (ref 136–145)
WBC # BLD AUTO: 8.82 K/UL (ref 3.9–12.7)

## 2024-05-08 PROCEDURE — 25000003 PHARM REV CODE 250: Performed by: STUDENT IN AN ORGANIZED HEALTH CARE EDUCATION/TRAINING PROGRAM

## 2024-05-08 PROCEDURE — 36415 COLL VENOUS BLD VENIPUNCTURE: CPT | Performed by: INTERNAL MEDICINE

## 2024-05-08 PROCEDURE — 25000003 PHARM REV CODE 250: Performed by: INTERNAL MEDICINE

## 2024-05-08 PROCEDURE — 63600175 PHARM REV CODE 636 W HCPCS: Performed by: INTERNAL MEDICINE

## 2024-05-08 PROCEDURE — 25000003 PHARM REV CODE 250

## 2024-05-08 PROCEDURE — 85025 COMPLETE CBC W/AUTO DIFF WBC: CPT | Performed by: INTERNAL MEDICINE

## 2024-05-08 PROCEDURE — 94760 N-INVAS EAR/PLS OXIMETRY 1: CPT

## 2024-05-08 PROCEDURE — 83735 ASSAY OF MAGNESIUM: CPT | Performed by: INTERNAL MEDICINE

## 2024-05-08 PROCEDURE — 63600175 PHARM REV CODE 636 W HCPCS

## 2024-05-08 PROCEDURE — 94799 UNLISTED PULMONARY SVC/PX: CPT | Mod: XB

## 2024-05-08 PROCEDURE — 12000002 HC ACUTE/MED SURGE SEMI-PRIVATE ROOM

## 2024-05-08 PROCEDURE — 80053 COMPREHEN METABOLIC PANEL: CPT | Performed by: INTERNAL MEDICINE

## 2024-05-08 PROCEDURE — 94761 N-INVAS EAR/PLS OXIMETRY MLT: CPT

## 2024-05-08 PROCEDURE — 99900035 HC TECH TIME PER 15 MIN (STAT)

## 2024-05-08 RX ADMIN — BUSPIRONE HYDROCHLORIDE 7.5 MG: 5 TABLET ORAL at 09:05

## 2024-05-08 RX ADMIN — OXYCODONE HYDROCHLORIDE AND ACETAMINOPHEN 1 TABLET: 10; 325 TABLET ORAL at 10:05

## 2024-05-08 RX ADMIN — SERTRALINE HYDROCHLORIDE 100 MG: 50 TABLET ORAL at 09:05

## 2024-05-08 RX ADMIN — ENOXAPARIN SODIUM 40 MG: 40 INJECTION SUBCUTANEOUS at 09:05

## 2024-05-08 RX ADMIN — ONDANSETRON 4 MG: 2 INJECTION INTRAMUSCULAR; INTRAVENOUS at 05:05

## 2024-05-08 RX ADMIN — DIPHENOXYLATE HYDROCHLORIDE AND ATROPINE SULFATE 1 TABLET: 2.5; .025 TABLET ORAL at 09:05

## 2024-05-08 RX ADMIN — ZOLPIDEM TARTRATE 10 MG: 5 TABLET, COATED ORAL at 09:05

## 2024-05-08 RX ADMIN — OXYCODONE HYDROCHLORIDE AND ACETAMINOPHEN 1 TABLET: 10; 325 TABLET ORAL at 12:05

## 2024-05-08 RX ADMIN — ZOLPIDEM TARTRATE 10 MG: 5 TABLET, COATED ORAL at 12:05

## 2024-05-08 RX ADMIN — ENOXAPARIN SODIUM 40 MG: 40 INJECTION SUBCUTANEOUS at 10:05

## 2024-05-08 RX ADMIN — METOPROLOL SUCCINATE 50 MG: 50 TABLET, FILM COATED, EXTENDED RELEASE ORAL at 09:05

## 2024-05-08 RX ADMIN — PANCRELIPASE 3 CAPSULE: 60000; 12000; 38000 CAPSULE, DELAYED RELEASE PELLETS ORAL at 05:05

## 2024-05-08 RX ADMIN — DICYCLOMINE HYDROCHLORIDE 20 MG: 10 CAPSULE ORAL at 09:05

## 2024-05-08 RX ADMIN — OXYCODONE HYDROCHLORIDE AND ACETAMINOPHEN 1 TABLET: 10; 325 TABLET ORAL at 03:05

## 2024-05-08 RX ADMIN — BUSPIRONE HYDROCHLORIDE 7.5 MG: 5 TABLET ORAL at 03:05

## 2024-05-08 RX ADMIN — CHOLESTYRAMINE LIGHT 4 G: 4 POWDER, FOR SUSPENSION ORAL at 09:05

## 2024-05-08 RX ADMIN — CHOLESTYRAMINE LIGHT 4 G: 4 POWDER, FOR SUSPENSION ORAL at 03:05

## 2024-05-08 RX ADMIN — ONDANSETRON 4 MG: 2 INJECTION INTRAMUSCULAR; INTRAVENOUS at 01:05

## 2024-05-08 RX ADMIN — METHYLPREDNISOLONE SODIUM SUCCINATE 60 MG: 125 INJECTION, POWDER, FOR SOLUTION INTRAMUSCULAR; INTRAVENOUS at 09:05

## 2024-05-08 RX ADMIN — DICYCLOMINE HYDROCHLORIDE 20 MG: 10 CAPSULE ORAL at 10:05

## 2024-05-08 RX ADMIN — FAMOTIDINE 20 MG: 20 TABLET ORAL at 09:05

## 2024-05-08 RX ADMIN — DICYCLOMINE HYDROCHLORIDE 20 MG: 10 CAPSULE ORAL at 05:05

## 2024-05-08 RX ADMIN — OXYCODONE HYDROCHLORIDE AND ACETAMINOPHEN 1 TABLET: 10; 325 TABLET ORAL at 09:05

## 2024-05-08 RX ADMIN — Medication 800 MG: at 09:05

## 2024-05-08 NOTE — CARE UPDATE
05/08/24 0721   PRE-TX-O2   Device (Oxygen Therapy) room air   SpO2 (!) 93 %   Pulse Oximetry Type Intermittent   $ Pulse Oximetry - Multiple Charge Pulse Oximetry - Multiple   Pulse 74   Resp 15   Incentive Spirometer   $ Incentive Spirometer Charges done with encouragement   Administration (IS) proper technique demonstrated   Number of Repetitions (IS) 10   Level Incentive Spirometer (mL) 2000   Patient Tolerance (IS) good   Respiratory Evaluation   $ Care Plan Tech Time 15 min

## 2024-05-08 NOTE — PROGRESS NOTES
[facility]  Progress Note    Patient Name: Jacoby Jean-Baptiste  MRN: [id number]  Patient Class: IP- Inpatient   Admission Date: 5/3/2024  Length of Stay: 3 days  Attending Physician: Butch Rodriguez MD  Primary Care Provider: Hunter Aguilar III, MD        Subjective:     Principal Problem:Ulcerative colitis        HPI:  49 year old pt getting admitted with ulcerative colitis flare  Pt was admitted with same c/o this year(Feb/March)  Per Pt his last intake of steroids PO was few days ago  He was doing fine and started having profuse loose stools again  Noticed blood and pus in stools  Stools were not smelly as per pt(Has H/o C diff)  Symptoms got worse and pt came to hospital and got admitted   His last colonoscopy was in March this year     Overview/Hospital Course:  Patient was admitted to the hospital with UC flare.  The patient was monitored closely throughout his hospital stay.  Patient was started on IV steroids and antibiotics.  GI was consulted.  C diff stool study was obtained and was negative.  Stool cultures were ordered.  Antibiotics were discontinued.  Lomotil and Bentyl were ordered.     Interval History:   Patient seen and examined.  Overnight notes reviewed.  Patient continues to endorse abdominal pain.  Patient stated that he had an incontinent episode with diarrhea this morning.  GI following.  Recommendations appreciated.  Review of Systems   Constitutional:  Negative for activity change, appetite change, chills and fever.   Respiratory:  Negative for shortness of breath.    Cardiovascular:  Negative for chest pain and palpitations.   Gastrointestinal:  Positive for abdominal pain and diarrhea.   Genitourinary: Negative.    Neurological: Negative.      Objective:     Vital Signs (Most Recent):  Temp: 98.2 °F (36.8 °C) (05/08/24 1647)  Pulse: 63 (05/08/24 1647)  Resp: 18 (05/08/24 1647)  BP: 134/66 (05/08/24 1647)  SpO2: 95 % (05/08/24 1647) Vital Signs (24h  Range):  Temp:  [97.8 °F (36.6 °C)-98.5 °F (36.9 °C)] 98.2 °F (36.8 °C)  Pulse:  [60-83] 63  Resp:  [15-18] 18  SpO2:  [93 %-98 %] 95 %  BP: (117-151)/(66-90) 134/66     Weight: 131.1 kg (289 lb 0.4 oz)  Body mass index is 48.1 kg/m².    Intake/Output Summary (Last 24 hours) at 5/8/2024 1712  Last data filed at 5/8/2024 0924  Gross per 24 hour   Intake 740 ml   Output --   Net 740 ml         Physical Exam  Vitals and nursing note reviewed.   Constitutional:       General: He is not in acute distress.     Appearance: Normal appearance. He is obese. He is ill-appearing (chronically).   HENT:      Head: Normocephalic and atraumatic.   Cardiovascular:      Rate and Rhythm: Normal rate and regular rhythm.      Pulses: Normal pulses.      Heart sounds: Normal heart sounds.   Pulmonary:      Effort: Pulmonary effort is normal.      Breath sounds: Normal breath sounds.   Abdominal:      General: Abdomen is flat. Bowel sounds are normal.      Palpations: Abdomen is soft.      Tenderness: There is generalized abdominal tenderness. There is no guarding or rebound.   Musculoskeletal:      Cervical back: Normal range of motion and neck supple.   Skin:     General: Skin is warm.   Neurological:      General: No focal deficit present.      Mental Status: He is alert and oriented to person, place, and time. Mental status is at baseline.   Psychiatric:         Mood and Affect: Mood normal.         Behavior: Behavior normal.             Significant Labs: All pertinent labs within the past 24 hours have been reviewed.  Recent Lab Results         05/08/24  1615   05/08/24  0641   05/08/24  0621   05/07/24 2000        Albumin   3.3           ALP   46           ALT   13           Anion Gap   6           AST   11           Baso #   0.03           Basophil %   0.3           BILIRUBIN TOTAL   0.3  Comment: For infants and newborns, interpretation of results should be based  on gestational age, weight and in agreement with  clinical  observations.    Premature Infant recommended reference ranges:  Up to 24 hours.............<8.0 mg/dL  Up to 48 hours............<12.0 mg/dL  3-5 days..................<15.0 mg/dL  6-29 days.................<15.0 mg/dL             BUN   14           Calcium   9.1           Chloride   101           CO2   30           Creatinine   1.0           Differential Method   Automated           eGFR   >60.0           Eos #   0.2           Eos %   2.0           Glucose   91           Gran # (ANC)   5.2           Gran %   58.6           Hematocrit   33.9           Hemoglobin   10.5           Immature Grans (Abs)   0.07  Comment: Mild elevation in immature granulocytes is non specific and   can be seen in a variety of conditions including stress response,   acute inflammation, trauma and pregnancy. Correlation with other   laboratory and clinical findings is essential.             Immature Granulocytes   0.8           Lymph #   2.0           Lymph %   23.1           Magnesium    1.5           MCH   30.7           MCHC   31.0           MCV   99           Mono #   1.3           Mono %   15.2           MPV   9.4           nRBC   0           Platelet Count   313           POC Glucose 153     104   135       Potassium   3.9           PROTEIN TOTAL   6.4           RBC   3.42           RDW   16.4           Sodium   137           WBC   8.82                   Significant Imaging: I have reviewed all pertinent imaging results/findings within the past 24 hours.  Imaging Results              CT Abdomen Pelvis With IV Contrast NO Oral Contrast (Final result)  Result time 05/03/24 17:05:17      Final result by Rickie Brown MD (05/03/24 17:05:17)                   Impression:      Colonic wall thickening and pericolonic inflammatory stranding extending from the distal transverse colon through the descending colon, sigmoid colon, and rectum - compatible with acute colitis in this patient with history of ulcerative colitis.    No  evidence of perforation or abscess.    Bilateral nonobstructing nephrolithiasis.    Small cluster of reticulonodular densities dependent right lower lobe, likely infectious/inflammatory nature.  Please correlate with pulmonary symptoms.      Electronically signed by: Rickie Brown  Date:    05/03/2024  Time:    17:05               Narrative:    EXAMINATION:  CT ABDOMEN PELVIS WITH IV CONTRAST    CLINICAL HISTORY:  Abdominal pain, acute, nonlocalized;History of ulcerative colitis;    TECHNIQUE:  CMS Mandated Quality Data-CT Radiation Dose-436    All CT scans at this facility dose modulation, iterative reconstruction, and or weight-based dosing when appropriate to reduce radiation dose to as low as reasonably achievable.    COMPARISON:  CT abdomen and pelvis 03/14/2024    FINDINGS:  Left lung base is clear.  Small cluster of reticulonodular densities involving the dependent right lower lobe, new compared to prior. Bone window images show degenerative changes of the lumbar spine. No acute or aggressive osseous abnormality.    No focal hepatic lesion.  Gallbladder is surgically absent.  No intrahepatic or extrahepatic bile duct dilation.  Portal vein is patent.  Spleen is unremarkable.  Small left upper quadrant splenule at the hilar region.  Pancreas is unremarkable.  Duodenal diverticulum noted.  Next    No adrenal lesion.  Bilateral renal calculi including 3 mm calculus lower pole right kidney, 6 mm left lower pole renal calculus, with several additional 2-4 mm left renal calculi.  No hydronephrosis.  Ureters are normal in caliber.  No focal lesion of the urinary bladder.    Stomach is unremarkable.  No evidence of small-bowel obstruction.  Normal appendix.  Proximal colon is unremarkable.  Beginning at the mid/distal transverse colon, there is diffuse colonic wall thickening and mild pericolonic inflammatory stranding.  This extends throughout the descending colon, and is most pronounced in the region of the sigmoid  "colon, where the pericolonic stranding is moderate.  This extends distally through the rectal region.  No evidence of perforation.  No abscess/drainable fluid collection.    Aortoiliac atherosclerotic calcification.  No free fluid or free air within the abdomen or pelvis.  No pathologically enlarged lymph nodes.  Rim calcified nodule involving the anterior mesenteric fat is stable compared to prior, suggesting fat necrosis.                                        Assessment/Plan:      * Ulcerative colitis  Acute:  U colitis flare  Start iv steroids/iv abx: abx discontinued   Obtain  stool to r/o C diff: negative   Maintain cholestyramine PO  Stop Metformin/sertraline in view with loose stools  Stop PPI in view with C diff H/o   GI consulted:   -Added Bentyl and Lomotil  - recheck stool calprotectin and elastase.  - added Creon as an antidiarrheal  - transition to oral methylprednisolone equivalent instead of prednisone on discharge      Long term systemic steroid user  Last PO Budesonide intake was few weeks ago      PUD (peptic ulcer disease)  Maintain pepcid      History of Clostridioides difficile colitis  Aware   C diff stool study negative      BMI 45.0-49.9, adult  Body mass index is 48.1 kg/m². Morbid obesity complicates all aspects of disease management from diagnostic modalities to treatment.     Type 2 diabetes mellitus without complication, without long-term current use of insulin  Patient's FSGs are controlled on current medication regimen.  Last A1c reviewed-   Lab Results   Component Value Date    HGBA1C 5.7 03/16/2024     Most recent fingerstick glucose reviewed- No results for input(s): "POCTGLUCOSE" in the last 24 hours.  Current correctional scale  Medium  Maintain anti-hyperglycemic dose as follows-   Antihyperglycemics (From admission, onward)      Start     Stop Route Frequency Ordered    05/03/24 2034  insulin aspart U-100 pen 0-10 Units         -- SubQ Before meals & nightly PRN 05/03/24 1934 "          Hold Oral hypoglycemics while patient is in the hospital.      VTE Risk Mitigation (From admission, onward)           Ordered     enoxaparin injection 40 mg  Every 12 hours         05/03/24 1937     IP VTE HIGH RISK PATIENT  Once         05/03/24 1934     Place sequential compression device  Until discontinued         05/03/24 1934                    Discharge Planning   GERARDO: 5/10/2024     Code Status: Full Code   Is the patient medically ready for discharge?:     Reason for patient still in hospital (select all that apply): Patient trending condition and Consult recommendations  Discharge Plan A: Home                  Court Herr NP  Department of Hospital Medicine   [facility]

## 2024-05-08 NOTE — CARE UPDATE
05/07/24 1900   Patient Assessment/Suction   Level of Consciousness (AVPU) alert   Respiratory Effort Normal;Unlabored   Expansion/Accessory Muscles/Retractions no retractions   PRE-TX-O2   Device (Oxygen Therapy) room air   SpO2 (!) 94 %   Pulse Oximetry Type Intermittent   $ Pulse Oximetry - Single Charge Pulse Oximetry - Single   Pulse 75   Resp 18   Positioning HOB elevated 30 degrees   Positioning   Positioning/Transfer Devices pillows;in use   Incentive Spirometer   $ Incentive Spirometer Charges proper technique demonstrated   Incentive Spirometer Predicted Level (mL) 2250   Administration (IS) self-administered   Number of Repetitions (IS) 10   Level Incentive Spirometer (mL) 1500   Patient Tolerance (IS) good

## 2024-05-08 NOTE — PROGRESS NOTES
Patient Name: Jacoby Jean-Baptiste  Patient MRN: [id number]  Patient : 1975    Admit Date: 5/3/2024  Service date: 2024    Reason for Consult: diarrhea     PCP: Hunter Aguilar III, MD    S: non-bloody diarrhea; mild imrpoved since starting lomotil. No f/c.   Patient reports still 7-10 bowel movements today even though this is slightly better from the 20 that he came in on admission.  He reports that he is steroid dependent essentially that he starts to have excessive bleeding with his stool whenever he stops his outpatient steroids    Inpatient Medications:   busPIRone  7.5 mg Oral TID    cholestyramine-aspartame  1 packet Oral TID    dicyclomine  20 mg Oral QID (AC & HS)    diphenoxylate-atropine 2.5-0.025 mg  1 tablet Oral BID    enoxparin  40 mg Subcutaneous Q12H (prophylaxis, 0900/2100)    famotidine  20 mg Oral BID    methylPREDNISolone sodium succinate injection  60 mg Intravenous Daily    metoprolol succinate  50 mg Oral Daily    sertraline  100 mg Oral Daily       Current Facility-Administered Medications:     acetaminophen, 650 mg, Oral, Q8H PRN    acetaminophen, 650 mg, Oral, Q4H PRN    aluminum-magnesium hydroxide-simethicone, 30 mL, Oral, QID PRN    dextrose 50%, 12.5 g, Intravenous, PRN    dextrose 50%, 25 g, Intravenous, PRN    glucagon (human recombinant), 1 mg, Intramuscular, PRN    glucose, 16 g, Oral, PRN    glucose, 24 g, Oral, PRN    insulin aspart U-100, 0-10 Units, Subcutaneous, QID (AC + HS) PRN    LORazepam, 0.5 mg, Oral, Q6H PRN    magnesium oxide, 800 mg, Oral, PRN    magnesium oxide, 800 mg, Oral, PRN    melatonin, 6 mg, Oral, Nightly PRN    ondansetron, 4 mg, Intravenous, Q6H PRN    oxyCODONE-acetaminophen, 1 tablet, Oral, Q4H PRN    oxyCODONE-acetaminophen, 1 tablet, Oral, Q4H PRN    potassium bicarbonate, 35 mEq, Oral, PRN    potassium bicarbonate, 50 mEq, Oral, PRN    potassium bicarbonate, 60 mEq, Oral, PRN    potassium, sodium phosphates, 2 packet, Oral, PRN    potassium,  "sodium phosphates, 2 packet, Oral, PRN    potassium, sodium phosphates, 2 packet, Oral, PRN    zolpidem, 10 mg, Oral, Nightly PRN    Review of Systems:  A 10 point review of systems was performed and was normal, except as mentioned in the HPI, including constitutional, HEENT, heme, lymph, cardiovascular, respiratory, gastrointestinal, genitourinary, neurologic, endocrine, psychiatric and musculoskeletal.      OBJECTIVE:    Physical Exam:  24 Hour Vital Sign Ranges: Temp:  [97.8 °F (36.6 °C)-98.5 °F (36.9 °C)] 98.5 °F (36.9 °C)  Pulse:  [60-83] 73  Resp:  [15-18] 18  SpO2:  [93 %-98 %] 95 %  BP: (117-151)/(77-90) 138/78  Most recent vitals: /78   Pulse 73   Temp 98.5 °F (36.9 °C) (Oral)   Resp 18   Ht 5' 5" (1.651 m)   Wt 131.1 kg (289 lb 0.4 oz)   SpO2 95%   BMI 48.10 kg/m²    GEN: well-developed, well-nourished, awake and alert, non-toxic appearing adult  HEENT: PERRL, sclera anicteric, oral mucosa pink and moist without lesion  NECK: trachea midline; Good ROM  CV: regular rate and rhythm, no murmurs or gallops  RESP: clear to auscultation bilaterally, no wheezes, rhonci or rales  ABD: soft, non-tender, non-distended, normal bowel sounds  EXT: no swelling or edema, 2+ pulses distally  SKIN: no rashes or jaundice  PSYCH: normal affect    Labs:   Recent Labs     05/06/24  0636 05/07/24  0532 05/08/24  0641   WBC 10.42 8.53 8.82   * 101* 99*    320 313     Recent Labs     05/06/24  0636 05/07/24  0532 05/08/24  0641   * 136 137   K 3.9 4.2 3.9    101 101   CO2 25 28 30*   BUN 8 10 14   GLU 75 80 91     No results for input(s): "ALB" in the last 72 hours.    Invalid input(s): "ALKP", "SGOT", "SGPT", "TBIL", "DBIL", "TPRO"  No results for input(s): "PT", "INR", "PTT" in the last 72 hours.      Radiology Review:  CT Abdomen Pelvis With IV Contrast NO Oral Contrast   Final Result      Colonic wall thickening and pericolonic inflammatory stranding extending from the distal transverse " colon through the descending colon, sigmoid colon, and rectum - compatible with acute colitis in this patient with history of ulcerative colitis.      No evidence of perforation or abscess.      Bilateral nonobstructing nephrolithiasis.      Small cluster of reticulonodular densities dependent right lower lobe, likely infectious/inflammatory nature.  Please correlate with pulmonary symptoms.         Electronically signed by: Rickie Brown   Date:    05/03/2024   Time:    17:05            IMPRESSION / RECOMMENDATIONS:  50 y/o M w/ho HLD, DM on ozempic, HTN, LOLIS, umbilical hernia repair, tobacco use, C. diff s/p Vanco / dificid / FMT / rebyota, ulcerative colitis (remicade-fungal infection; Entyvio-non-responder; Omvoh started 4/'24) presents for evaluation of abdominal cramping/diarrhea in setting of know UC. CT w/ colitis but fortunately C. Diff negative.     -Continue Omvoh as outpatient as may take 8-12 weeks to take full effect  -Continue Lomotil / questran  -Switch IV steroids to PO  -Consider adding budesonide in next few weeks so prednisone can be weaned  -If refractory to omvoh, consider sterlara  -Avoid any abx unless absolutely necessary given recurrent C. Diff in past    5/8/24  - recheck stool calprotectin and elastase.  - add Creon as an antidiarrheal  - transition to oral methylprednisolone equivalent instead of prednisone on discharge    Odalis Mccabe  5/8/2024  4:09 PM

## 2024-05-08 NOTE — ASSESSMENT & PLAN NOTE
Acute:  U colitis flare  Start iv steroids/iv abx: abx discontinued   Obtain  stool to r/o C diff: negative   Maintain cholestyramine PO  Stop Metformin/sertraline in view with loose stools  Stop PPI in view with C diff H/o   GI consulted:   -Added Bentyl and Lomotil  - recheck stool calprotectin and elastase.  - added Creon as an antidiarrheal  - transition to oral methylprednisolone equivalent instead of prednisone on discharge

## 2024-05-08 NOTE — SUBJECTIVE & OBJECTIVE
Interval History:   Patient seen and examined.  Overnight notes reviewed.  Patient continues to endorse abdominal pain.  Patient stated that he had an incontinent episode with diarrhea this morning.  GI following.  Recommendations appreciated.  Review of Systems   Constitutional:  Negative for activity change, appetite change, chills and fever.   Respiratory:  Negative for shortness of breath.    Cardiovascular:  Negative for chest pain and palpitations.   Gastrointestinal:  Positive for abdominal pain and diarrhea.   Genitourinary: Negative.    Neurological: Negative.      Objective:     Vital Signs (Most Recent):  Temp: 98.2 °F (36.8 °C) (05/08/24 1647)  Pulse: 63 (05/08/24 1647)  Resp: 18 (05/08/24 1647)  BP: 134/66 (05/08/24 1647)  SpO2: 95 % (05/08/24 1647) Vital Signs (24h Range):  Temp:  [97.8 °F (36.6 °C)-98.5 °F (36.9 °C)] 98.2 °F (36.8 °C)  Pulse:  [60-83] 63  Resp:  [15-18] 18  SpO2:  [93 %-98 %] 95 %  BP: (117-151)/(66-90) 134/66     Weight: 131.1 kg (289 lb 0.4 oz)  Body mass index is 48.1 kg/m².    Intake/Output Summary (Last 24 hours) at 5/8/2024 1712  Last data filed at 5/8/2024 0924  Gross per 24 hour   Intake 740 ml   Output --   Net 740 ml         Physical Exam  Vitals and nursing note reviewed.   Constitutional:       General: He is not in acute distress.     Appearance: Normal appearance. He is obese. He is ill-appearing (chronically).   HENT:      Head: Normocephalic and atraumatic.   Cardiovascular:      Rate and Rhythm: Normal rate and regular rhythm.      Pulses: Normal pulses.      Heart sounds: Normal heart sounds.   Pulmonary:      Effort: Pulmonary effort is normal.      Breath sounds: Normal breath sounds.   Abdominal:      General: Abdomen is flat. Bowel sounds are normal.      Palpations: Abdomen is soft.      Tenderness: There is generalized abdominal tenderness. There is no guarding or rebound.   Musculoskeletal:      Cervical back: Normal range of motion and neck supple.   Skin:      General: Skin is warm.   Neurological:      General: No focal deficit present.      Mental Status: He is alert and oriented to person, place, and time. Mental status is at baseline.   Psychiatric:         Mood and Affect: Mood normal.         Behavior: Behavior normal.             Significant Labs: All pertinent labs within the past 24 hours have been reviewed.  Recent Lab Results         05/08/24  1615   05/08/24  0641   05/08/24  0621   05/07/24 2000        Albumin   3.3           ALP   46           ALT   13           Anion Gap   6           AST   11           Baso #   0.03           Basophil %   0.3           BILIRUBIN TOTAL   0.3  Comment: For infants and newborns, interpretation of results should be based  on gestational age, weight and in agreement with clinical  observations.    Premature Infant recommended reference ranges:  Up to 24 hours.............<8.0 mg/dL  Up to 48 hours............<12.0 mg/dL  3-5 days..................<15.0 mg/dL  6-29 days.................<15.0 mg/dL             BUN   14           Calcium   9.1           Chloride   101           CO2   30           Creatinine   1.0           Differential Method   Automated           eGFR   >60.0           Eos #   0.2           Eos %   2.0           Glucose   91           Gran # (ANC)   5.2           Gran %   58.6           Hematocrit   33.9           Hemoglobin   10.5           Immature Grans (Abs)   0.07  Comment: Mild elevation in immature granulocytes is non specific and   can be seen in a variety of conditions including stress response,   acute inflammation, trauma and pregnancy. Correlation with other   laboratory and clinical findings is essential.             Immature Granulocytes   0.8           Lymph #   2.0           Lymph %   23.1           Magnesium    1.5           MCH   30.7           MCHC   31.0           MCV   99           Mono #   1.3           Mono %   15.2           MPV   9.4           nRBC   0           Platelet Count   313            POC Glucose 153     104   135       Potassium   3.9           PROTEIN TOTAL   6.4           RBC   3.42           RDW   16.4           Sodium   137           WBC   8.82                   Significant Imaging: I have reviewed all pertinent imaging results/findings within the past 24 hours.  Imaging Results              CT Abdomen Pelvis With IV Contrast NO Oral Contrast (Final result)  Result time 05/03/24 17:05:17      Final result by Rickie Brown MD (05/03/24 17:05:17)                   Impression:      Colonic wall thickening and pericolonic inflammatory stranding extending from the distal transverse colon through the descending colon, sigmoid colon, and rectum - compatible with acute colitis in this patient with history of ulcerative colitis.    No evidence of perforation or abscess.    Bilateral nonobstructing nephrolithiasis.    Small cluster of reticulonodular densities dependent right lower lobe, likely infectious/inflammatory nature.  Please correlate with pulmonary symptoms.      Electronically signed by: Rickie Brown  Date:    05/03/2024  Time:    17:05               Narrative:    EXAMINATION:  CT ABDOMEN PELVIS WITH IV CONTRAST    CLINICAL HISTORY:  Abdominal pain, acute, nonlocalized;History of ulcerative colitis;    TECHNIQUE:  CMS Mandated Quality Data-CT Radiation Dose-436    All CT scans at this facility dose modulation, iterative reconstruction, and or weight-based dosing when appropriate to reduce radiation dose to as low as reasonably achievable.    COMPARISON:  CT abdomen and pelvis 03/14/2024    FINDINGS:  Left lung base is clear.  Small cluster of reticulonodular densities involving the dependent right lower lobe, new compared to prior. Bone window images show degenerative changes of the lumbar spine. No acute or aggressive osseous abnormality.    No focal hepatic lesion.  Gallbladder is surgically absent.  No intrahepatic or extrahepatic bile duct dilation.  Portal vein is patent.   Spleen is unremarkable.  Small left upper quadrant splenule at the hilar region.  Pancreas is unremarkable.  Duodenal diverticulum noted.  Next    No adrenal lesion.  Bilateral renal calculi including 3 mm calculus lower pole right kidney, 6 mm left lower pole renal calculus, with several additional 2-4 mm left renal calculi.  No hydronephrosis.  Ureters are normal in caliber.  No focal lesion of the urinary bladder.    Stomach is unremarkable.  No evidence of small-bowel obstruction.  Normal appendix.  Proximal colon is unremarkable.  Beginning at the mid/distal transverse colon, there is diffuse colonic wall thickening and mild pericolonic inflammatory stranding.  This extends throughout the descending colon, and is most pronounced in the region of the sigmoid colon, where the pericolonic stranding is moderate.  This extends distally through the rectal region.  No evidence of perforation.  No abscess/drainable fluid collection.    Aortoiliac atherosclerotic calcification.  No free fluid or free air within the abdomen or pelvis.  No pathologically enlarged lymph nodes.  Rim calcified nodule involving the anterior mesenteric fat is stable compared to prior, suggesting fat necrosis.

## 2024-05-09 ENCOUNTER — TELEPHONE (OUTPATIENT)
Dept: FAMILY MEDICINE | Facility: CLINIC | Age: 49
End: 2024-05-09
Payer: COMMERCIAL

## 2024-05-09 VITALS
OXYGEN SATURATION: 93 % | HEART RATE: 76 BPM | DIASTOLIC BLOOD PRESSURE: 67 MMHG | SYSTOLIC BLOOD PRESSURE: 110 MMHG | HEIGHT: 65 IN | RESPIRATION RATE: 17 BRPM | WEIGHT: 289 LBS | TEMPERATURE: 98 F | BODY MASS INDEX: 48.15 KG/M2

## 2024-05-09 LAB
ALBUMIN SERPL BCP-MCNC: 3.5 G/DL (ref 3.5–5.2)
ALP SERPL-CCNC: 48 U/L (ref 55–135)
ALT SERPL W/O P-5'-P-CCNC: 14 U/L (ref 10–44)
ANION GAP SERPL CALC-SCNC: 7 MMOL/L (ref 8–16)
AST SERPL-CCNC: 9 U/L (ref 10–40)
BASOPHILS # BLD AUTO: 0.03 K/UL (ref 0–0.2)
BASOPHILS NFR BLD: 0.3 % (ref 0–1.9)
BILIRUB SERPL-MCNC: 0.3 MG/DL (ref 0.1–1)
BUN SERPL-MCNC: 15 MG/DL (ref 6–20)
CALCIUM SERPL-MCNC: 8.9 MG/DL (ref 8.7–10.5)
CHLORIDE SERPL-SCNC: 100 MMOL/L (ref 95–110)
CO2 SERPL-SCNC: 29 MMOL/L (ref 23–29)
CREAT SERPL-MCNC: 1 MG/DL (ref 0.5–1.4)
DIFFERENTIAL METHOD BLD: ABNORMAL
EOSINOPHIL # BLD AUTO: 0.2 K/UL (ref 0–0.5)
EOSINOPHIL NFR BLD: 2 % (ref 0–8)
ERYTHROCYTE [DISTWIDTH] IN BLOOD BY AUTOMATED COUNT: 16 % (ref 11.5–14.5)
EST. GFR  (NO RACE VARIABLE): >60 ML/MIN/1.73 M^2
GLUCOSE SERPL-MCNC: 156 MG/DL (ref 70–110)
GLUCOSE SERPL-MCNC: 86 MG/DL (ref 70–110)
GLUCOSE SERPL-MCNC: 92 MG/DL (ref 70–110)
HCT VFR BLD AUTO: 36.7 % (ref 40–54)
HGB BLD-MCNC: 11.4 G/DL (ref 14–18)
IMM GRANULOCYTES # BLD AUTO: 0.11 K/UL (ref 0–0.04)
IMM GRANULOCYTES NFR BLD AUTO: 1.3 % (ref 0–0.5)
LYMPHOCYTES # BLD AUTO: 1.9 K/UL (ref 1–4.8)
LYMPHOCYTES NFR BLD: 21.4 % (ref 18–48)
MAGNESIUM SERPL-MCNC: 1.6 MG/DL (ref 1.6–2.6)
MCH RBC QN AUTO: 30.5 PG (ref 27–31)
MCHC RBC AUTO-ENTMCNC: 31.1 G/DL (ref 32–36)
MCV RBC AUTO: 98 FL (ref 82–98)
MONOCYTES # BLD AUTO: 1.5 K/UL (ref 0.3–1)
MONOCYTES NFR BLD: 17 % (ref 4–15)
NEUTROPHILS # BLD AUTO: 5.1 K/UL (ref 1.8–7.7)
NEUTROPHILS NFR BLD: 58 % (ref 38–73)
NRBC BLD-RTO: 0 /100 WBC
PLATELET # BLD AUTO: 337 K/UL (ref 150–450)
PMV BLD AUTO: 9.6 FL (ref 9.2–12.9)
POTASSIUM SERPL-SCNC: 3.9 MMOL/L (ref 3.5–5.1)
PROT SERPL-MCNC: 6.5 G/DL (ref 6–8.4)
RBC # BLD AUTO: 3.74 M/UL (ref 4.6–6.2)
SODIUM SERPL-SCNC: 136 MMOL/L (ref 136–145)
WBC # BLD AUTO: 8.71 K/UL (ref 3.9–12.7)

## 2024-05-09 PROCEDURE — 63600175 PHARM REV CODE 636 W HCPCS

## 2024-05-09 PROCEDURE — 83993 ASSAY FOR CALPROTECTIN FECAL: CPT | Performed by: INTERNAL MEDICINE

## 2024-05-09 PROCEDURE — 82653 EL-1 FECAL QUANTITATIVE: CPT | Performed by: INTERNAL MEDICINE

## 2024-05-09 PROCEDURE — 94799 UNLISTED PULMONARY SVC/PX: CPT

## 2024-05-09 PROCEDURE — 36415 COLL VENOUS BLD VENIPUNCTURE: CPT | Performed by: INTERNAL MEDICINE

## 2024-05-09 PROCEDURE — 25000003 PHARM REV CODE 250: Performed by: INTERNAL MEDICINE

## 2024-05-09 PROCEDURE — 80053 COMPREHEN METABOLIC PANEL: CPT | Performed by: INTERNAL MEDICINE

## 2024-05-09 PROCEDURE — 85025 COMPLETE CBC W/AUTO DIFF WBC: CPT | Performed by: INTERNAL MEDICINE

## 2024-05-09 PROCEDURE — 99900031 HC PATIENT EDUCATION (STAT)

## 2024-05-09 PROCEDURE — 63600175 PHARM REV CODE 636 W HCPCS: Performed by: INTERNAL MEDICINE

## 2024-05-09 PROCEDURE — 25000003 PHARM REV CODE 250: Performed by: STUDENT IN AN ORGANIZED HEALTH CARE EDUCATION/TRAINING PROGRAM

## 2024-05-09 PROCEDURE — 25000003 PHARM REV CODE 250

## 2024-05-09 PROCEDURE — 94761 N-INVAS EAR/PLS OXIMETRY MLT: CPT

## 2024-05-09 PROCEDURE — 83735 ASSAY OF MAGNESIUM: CPT | Performed by: INTERNAL MEDICINE

## 2024-05-09 RX ORDER — PROMETHAZINE HYDROCHLORIDE 25 MG/1
25 TABLET ORAL 4 TIMES DAILY PRN
Qty: 25 TABLET | Refills: 0 | Status: SHIPPED | OUTPATIENT
Start: 2024-05-09

## 2024-05-09 RX ORDER — DICYCLOMINE HYDROCHLORIDE 10 MG/1
10 CAPSULE ORAL 3 TIMES DAILY PRN
Qty: 90 CAPSULE | Refills: 0 | Status: SHIPPED | OUTPATIENT
Start: 2024-05-09

## 2024-05-09 RX ORDER — OXYCODONE AND ACETAMINOPHEN 5; 325 MG/1; MG/1
1 TABLET ORAL EVERY 8 HOURS PRN
Qty: 12 TABLET | Refills: 0 | Status: SHIPPED | OUTPATIENT
Start: 2024-05-09 | End: 2024-06-01

## 2024-05-09 RX ORDER — METHYLPREDNISOLONE 8 MG/1
TABLET ORAL
Qty: 103 TABLET | Refills: 0 | Status: ON HOLD | OUTPATIENT
Start: 2024-05-09 | End: 2024-06-08 | Stop reason: HOSPADM

## 2024-05-09 RX ORDER — DIPHENOXYLATE HYDROCHLORIDE AND ATROPINE SULFATE 2.5; .025 MG/1; MG/1
1 TABLET ORAL 2 TIMES DAILY PRN
Qty: 20 TABLET | Refills: 0 | Status: SHIPPED | OUTPATIENT
Start: 2024-05-09 | End: 2024-05-19

## 2024-05-09 RX ADMIN — OXYCODONE HYDROCHLORIDE AND ACETAMINOPHEN 1 TABLET: 10; 325 TABLET ORAL at 09:05

## 2024-05-09 RX ADMIN — ENOXAPARIN SODIUM 40 MG: 40 INJECTION SUBCUTANEOUS at 09:05

## 2024-05-09 RX ADMIN — SERTRALINE HYDROCHLORIDE 100 MG: 50 TABLET ORAL at 09:05

## 2024-05-09 RX ADMIN — DICYCLOMINE HYDROCHLORIDE 20 MG: 10 CAPSULE ORAL at 05:05

## 2024-05-09 RX ADMIN — METOPROLOL SUCCINATE 50 MG: 50 TABLET, FILM COATED, EXTENDED RELEASE ORAL at 09:05

## 2024-05-09 RX ADMIN — DICYCLOMINE HYDROCHLORIDE 20 MG: 10 CAPSULE ORAL at 12:05

## 2024-05-09 RX ADMIN — CHOLESTYRAMINE LIGHT 4 G: 4 POWDER, FOR SUSPENSION ORAL at 09:05

## 2024-05-09 RX ADMIN — PANCRELIPASE 3 CAPSULE: 60000; 12000; 38000 CAPSULE, DELAYED RELEASE PELLETS ORAL at 12:05

## 2024-05-09 RX ADMIN — DIPHENOXYLATE HYDROCHLORIDE AND ATROPINE SULFATE 1 TABLET: 2.5; .025 TABLET ORAL at 09:05

## 2024-05-09 RX ADMIN — ONDANSETRON 4 MG: 2 INJECTION INTRAMUSCULAR; INTRAVENOUS at 05:05

## 2024-05-09 RX ADMIN — FAMOTIDINE 20 MG: 20 TABLET ORAL at 09:05

## 2024-05-09 RX ADMIN — OXYCODONE HYDROCHLORIDE AND ACETAMINOPHEN 1 TABLET: 10; 325 TABLET ORAL at 03:05

## 2024-05-09 RX ADMIN — ONDANSETRON 4 MG: 2 INJECTION INTRAMUSCULAR; INTRAVENOUS at 12:05

## 2024-05-09 RX ADMIN — METHYLPREDNISOLONE SODIUM SUCCINATE 60 MG: 125 INJECTION, POWDER, FOR SOLUTION INTRAMUSCULAR; INTRAVENOUS at 09:05

## 2024-05-09 RX ADMIN — BUSPIRONE HYDROCHLORIDE 7.5 MG: 5 TABLET ORAL at 09:05

## 2024-05-09 NOTE — PROGRESS NOTES
Discharge instructions given to pt. Pt verbalized understanding. PIV removed. Pt leaving with personal belongings. Meds to be picked up from pharmacy. Pt leaving with family.

## 2024-05-09 NOTE — PLAN OF CARE
Problem: Adult Inpatient Plan of Care  Goal: Plan of Care Review  Outcome: Met  Goal: Patient-Specific Goal (Individualized)  Outcome: Met  Goal: Absence of Hospital-Acquired Illness or Injury  Outcome: Met  Goal: Optimal Comfort and Wellbeing  Outcome: Met  Goal: Readiness for Transition of Care  Outcome: Met     Problem: Bariatric Environmental Safety  Goal: Safety Maintained with Care  Outcome: Met     Problem: Diabetes Comorbidity  Goal: Blood Glucose Level Within Targeted Range  Outcome: Met     Problem: Infection  Goal: Absence of Infection Signs and Symptoms  Outcome: Met

## 2024-05-09 NOTE — CARE UPDATE
05/08/24 2033   Patient Assessment/Suction   Level of Consciousness (AVPU) alert   Respiratory Effort Normal;Unlabored   Expansion/Accessory Muscles/Retractions no retractions;no use of accessory muscles   CONRADO Breath Sounds clear   LLL Breath Sounds diminished   RUL Breath Sounds clear   RML Breath Sounds clear   RLL Breath Sounds diminished   Rhythm/Pattern, Respiratory unlabored;pattern regular   Cough Frequency no cough   PRE-TX-O2   Device (Oxygen Therapy) room air   SpO2 96 %   Pulse Oximetry Type Intermittent   $ Pulse Oximetry - Single Charge Pulse Oximetry - Single   Incentive Spirometer   $ Incentive Spirometer Charges done with encouragement   Incentive Spirometer Predicted Level (mL) 2250   Administration (IS) self-administered   Number of Repetitions (IS) 10   Level Incentive Spirometer (mL) 2000   Patient Tolerance (IS) good

## 2024-05-09 NOTE — PLAN OF CARE
Patient cleared for discharge by case management to home no needs.        05/09/24 1306   Final Note   Assessment Type Final Discharge Note   Anticipated Discharge Disposition Home   Hospital Resources/Appts/Education Provided Appointments scheduled and added to AVS

## 2024-05-09 NOTE — DISCHARGE SUMMARY
[facility] Medicine  Discharge Summary      Patient Name: Jacoby Jean-Baptiste  MRN: [id number]  DAYSI: [id number]  Patient Class: IP- Inpatient  Admission Date: 5/3/2024  Hospital Length of Stay: 4 days  Discharge Date and Time: 5/9/2024  2:47 PM  Attending Physician: No att. providers found   Discharging Provider: JAMIA Saini  Primary Care Provider: Hunter Aguilar III, MD    Primary Care Team: Networked reference to record PCT     HPI:   49 year old pt getting admitted with ulcerative colitis flare  Pt was admitted with same c/o this year(Feb/March)  Per Pt his last intake of steroids PO was few days ago  He was doing fine and started having profuse loose stools again  Noticed blood and pus in stools  Stools were not smelly as per pt(Has H/o C diff)  Symptoms got worse and pt came to hospital and got admitted   His last colonoscopy was in March this year     * No surgery found *      Hospital Course:   Patient was admitted to the hospital with ulcerative colitis flare.  The patient was monitored closely throughout his hospital stay.  Patient was started on IV steroids and antibiotics.  GI was consulted.  C diff stool study was obtained and was negative.  Stool cultures were obtained with no salmonella, shigella, vibrio, camylobacter or E. Coli isolated.  Antibiotics were discontinued.   Lomotil and Bentyl were ordered and the frequency of his diarrhea stools improved.  Gastroenterology was consulted for further recommendations which included continue lomotil and questran.  Creon was added as an antidiarrheal.  He was transitioned to oral methylprednisolone from IV.  Future considerations include initiation of Omvoh and adding budesonide so that the oral steroid can be weaned.  Avoid abx as able given hx recurrent c.diff.  These recommendations were discussed with the patient and may be addressed futher at his follow up with GI.  He was cleared for discharge from the standpoint of  gastroenterology. On the date of discharge, he was seen and examined and suitable for dc.  Discharge instructions were reviewed and return precautions discussed.  He was discharged home in stable condition.     Goals of Care Treatment Preferences:  Code Status: Full Code      Consults:   Consults (From admission, onward)          Status Ordering Provider     Inpatient consult to Gastroenterology  Once        Provider:  David Millan MD Completed ORTEGO, ANNA C.            No new Assessment & Plan notes have been filed under this hospital service since the last note was generated.  Service: Hospital Medicine    Final Active Diagnoses:    Diagnosis Date Noted POA    PRINCIPAL PROBLEM:  Ulcerative colitis [K51.90] 04/20/2023 Yes     Chronic    Long term systemic steroid user [Z79.52] 04/11/2024 Not Applicable    PUD (peptic ulcer disease) [K27.9] 04/24/2023 Yes    History of Clostridioides difficile colitis [Z86.19] 04/20/2023 Not Applicable     Chronic    BMI 45.0-49.9, adult [Z68.42] 06/23/2022 Not Applicable    Type 2 diabetes mellitus without complication, without long-term current use of insulin [E11.9] 01/29/2022 Yes      Problems Resolved During this Admission:    Diagnosis Date Noted Date Resolved POA    Atrial fibrillation, unspecified type [I48.91] 01/09/2024 05/08/2024 Yes       Discharged Condition: stable    Disposition: Home or Self Care    Follow Up:   Follow-up Information       Hunter Aguilar III, MD Follow up in 1 week(s).    Specialty: Family Medicine  Why: Message sent to Dr. Aguilar's office to call patient to schedule follow up visit.  Contact information:  [address] [place] [address]  [address]  [place] [address]  [phone]               Shan Jean III, MD Follow up on 5/15/2024.    Specialty: Gastroenterology  Why: 10:00 am  Contact information:  [address] [place] [address] [address] [place] [address]  [phone]                           Patient Instructions:      Diet Adult Regular     Notify your  health care provider if you experience any of the following:  temperature >100.4     Notify your health care provider if you experience any of the following:  severe uncontrolled pain     Notify your health care provider if you experience any of the following:  redness, tenderness, or signs of infection (pain, swelling, redness, odor or green/yellow discharge around incision site)     Notify your health care provider if you experience any of the following:  difficulty breathing or increased cough     Notify your health care provider if you experience any of the following:  severe persistent headache     Notify your health care provider if you experience any of the following:  persistent dizziness, light-headedness, or visual disturbances     Notify your health care provider if you experience any of the following:  increased confusion or weakness     Activity as tolerated       Significant Diagnostic Studies: Labs: All labs within the past 24 hours have been reviewed    Pending Diagnostic Studies:       Procedure Component Value Units Date/Time    Calprotectin, Stool [[id number]] Collected: 05/09/24 0535    Order Status: Sent Lab Status: In process Updated: 05/09/24 0548    Specimen: Stool     Pancreatic elastase, fecal [[id number]] Collected: 05/09/24 0535    Order Status: Sent Lab Status: In process Updated: 05/09/24 0548    Specimen: Stool            Medications:  Reconciled Home Medications:      Medication List        START taking these medications      diphenoxylate-atropine 2.5-0.025 mg 2.5-0.025 mg per tablet  Commonly known as: LOMOTIL  Take 1 tablet by mouth 2 (two) times daily as needed for Diarrhea.     lipase-protease-amylase 12,000-38,000-60,000 units Cpdr  Commonly known as: CREON  Take 3 capsules by mouth 3 (three) times daily with meals.     methylPREDNISolone 8 MG tablet  Commonly known as: MEDROL  Take 7 tablets (56 mg total) by mouth once daily for 4 days, THEN 6 tablets (48 mg total) once daily  for 4 days, THEN 5 tablets (40 mg total) once daily for 4 days, THEN 4 tablets (32 mg total) once daily for 3 days, THEN 3 tablets (24 mg total) once daily for 3 days, THEN 2 tablets (16 mg total) once daily for 3 days, THEN 1 tablet (8 mg total) once daily for 4 days.  Start taking on: May 9, 2024     oxyCODONE-acetaminophen 5-325 mg per tablet  Commonly known as: PERCOCET  Take 1 tablet by mouth every 8 (eight) hours as needed for Pain.            CONTINUE taking these medications      busPIRone 7.5 MG tablet  Commonly known as: BUSPAR  Take 1 tablet (7.5 mg total) by mouth 3 (three) times daily.     cholestyramine 4 gram packet  Commonly known as: QUESTRAN  Take 1 packet by mouth 3 (three) times daily.     dicyclomine 10 MG capsule  Commonly known as: BENTYL  Take 1 capsule (10 mg total) by mouth 3 (three) times daily as needed.     ergocalciferol 50,000 unit Cap  Commonly known as: VITAMIN D2  Take 1 capsule (50,000 Units total) by mouth every 7 days.     LORazepam 0.5 MG tablet  Commonly known as: ATIVAN  Take 1 tablet (0.5 mg total) by mouth every 6 (six) hours as needed for Anxiety.     metFORMIN 500 MG ER 24hr tablet  Commonly known as: GLUCOPHAGE-XR  Take 1 tablet (500 mg total) by mouth 2 (two) times daily with meals.     metoprolol succinate 50 MG 24 hr tablet  Commonly known as: TOPROL-XL  Take 1 tablet (50 mg total) by mouth once daily.     OZEMPIC 1 mg/dose (4 mg/3 mL)  Generic drug: semaglutide  Inject 1 mg into the skin every 7 days. SATURDAYS     pantoprazole 40 MG tablet  Commonly known as: PROTONIX  Take 1 tablet (40 mg total) by mouth 2 (two) times daily.     promethazine 25 MG tablet  Commonly known as: PHENERGAN  Take 1 tablet (25 mg total) by mouth 4 (four) times daily as needed for Nausea.     sertraline 100 MG tablet  Commonly known as: ZOLOFT  Take 1 tablet (100 mg total) by mouth once daily.     simvastatin 20 MG tablet  Commonly known as: ZOCOR  Take 20 mg by mouth every evening.      varenicline 1 mg Tab  Commonly known as: CHANTIX  Take 1 tablet (1 mg total) by mouth 2 (two) times daily.     zolpidem 10 mg Tab  Commonly known as: AMBIEN  Take 1 tablet (10 mg total) by mouth nightly as needed (insomnia).              Indwelling Lines/Drains at time of discharge:   Lines/Drains/Airways       None                   Time spent on the discharge of patient: 33 minutes         AJMIA Saini  Department of Hospital Medicine  [facility]

## 2024-05-09 NOTE — CARE UPDATE
05/09/24 0848   Patient Assessment/Suction   Level of Consciousness (AVPU) alert   Respiratory Effort Normal;Unlabored   Expansion/Accessory Muscles/Retractions no use of accessory muscles   All Lung Fields Breath Sounds clear;diminished   Rhythm/Pattern, Respiratory unlabored   Cough Frequency no cough   PRE-TX-O2   Device (Oxygen Therapy) room air   SpO2 97 %   Pulse Oximetry Type Intermittent   $ Pulse Oximetry - Multiple Charge Pulse Oximetry - Multiple   Pulse 74   Resp 16   Incentive Spirometer   $ Incentive Spirometer Charges done with encouragement   Incentive Spirometer Predicted Level (mL) 2250   Administration (IS) self-administered   Number of Repetitions (IS) 8   Level Incentive Spirometer (mL) 1800   Patient Tolerance (IS) good;no adverse signs/symptoms present   Education   $ Education Other (see comment);15 min  (IS)

## 2024-05-09 NOTE — TELEPHONE ENCOUNTER
----- Message from Radha Aguilar RN sent at 5/9/2024  1:00 PM CDT -----  Regarding: hospital follow up  Hi, patient discharging from [facility] and needs a pcp follow up visit within 7 days. I attempted to schedule but there are no appointments available until may 22. Please contact patient to schedule appointment. '  Thank you

## 2024-05-10 ENCOUNTER — PATIENT OUTREACH (OUTPATIENT)
Dept: ADMINISTRATIVE | Facility: CLINIC | Age: 49
End: 2024-05-10
Payer: COMMERCIAL

## 2024-05-16 LAB
LABCORP MISC TEST CODE: NORMAL
LABCORP MISC TEST NAME: NORMAL
LABCORP MISCELLANEOUS TEST: NORMAL

## 2024-05-17 ENCOUNTER — OFFICE VISIT (OUTPATIENT)
Dept: FAMILY MEDICINE | Facility: CLINIC | Age: 49
End: 2024-05-17
Payer: COMMERCIAL

## 2024-05-17 VITALS
OXYGEN SATURATION: 97 % | BODY MASS INDEX: 45.62 KG/M2 | HEIGHT: 65 IN | TEMPERATURE: 99 F | WEIGHT: 273.81 LBS | HEART RATE: 85 BPM

## 2024-05-17 DIAGNOSIS — K51.911 ULCERATIVE COLITIS WITH RECTAL BLEEDING, UNSPECIFIED LOCATION: Chronic | ICD-10-CM

## 2024-05-17 DIAGNOSIS — E53.8 VITAMIN B12 DEFICIENCY: ICD-10-CM

## 2024-05-17 DIAGNOSIS — E11.9 TYPE 2 DIABETES MELLITUS WITHOUT COMPLICATION, WITHOUT LONG-TERM CURRENT USE OF INSULIN: Primary | ICD-10-CM

## 2024-05-17 DIAGNOSIS — Z79.52 LONG TERM SYSTEMIC STEROID USER: ICD-10-CM

## 2024-05-17 LAB
CALPROTECTIN STL-MCNT: 2850 UG/G (ref 0–120)
ELASTASE PANC STL-MCNT: 269 UG ELAST./G

## 2024-05-17 PROCEDURE — 3044F HG A1C LEVEL LT 7.0%: CPT | Mod: CPTII,S$GLB,, | Performed by: FAMILY MEDICINE

## 2024-05-17 PROCEDURE — 1111F DSCHRG MED/CURRENT MED MERGE: CPT | Mod: CPTII,S$GLB,, | Performed by: FAMILY MEDICINE

## 2024-05-17 PROCEDURE — 3072F LOW RISK FOR RETINOPATHY: CPT | Mod: CPTII,S$GLB,, | Performed by: FAMILY MEDICINE

## 2024-05-17 PROCEDURE — 1160F RVW MEDS BY RX/DR IN RCRD: CPT | Mod: CPTII,S$GLB,, | Performed by: FAMILY MEDICINE

## 2024-05-17 PROCEDURE — 99214 OFFICE O/P EST MOD 30 MIN: CPT | Mod: S$GLB,,, | Performed by: FAMILY MEDICINE

## 2024-05-17 PROCEDURE — 1159F MED LIST DOCD IN RCRD: CPT | Mod: CPTII,S$GLB,, | Performed by: FAMILY MEDICINE

## 2024-05-17 PROCEDURE — 99999 PR PBB SHADOW E&M-EST. PATIENT-LVL IV: CPT | Mod: PBBFAC,,, | Performed by: FAMILY MEDICINE

## 2024-05-17 PROCEDURE — 3008F BODY MASS INDEX DOCD: CPT | Mod: CPTII,S$GLB,, | Performed by: FAMILY MEDICINE

## 2024-05-19 PROBLEM — E53.8 VITAMIN B12 DEFICIENCY: Status: ACTIVE | Noted: 2024-05-19

## 2024-05-20 NOTE — PROGRESS NOTES
Subjective:       Patient ID: Jacoby Jean-Baptiste is a 49 y.o. male.    Chief Complaint: No chief complaint on file.     Hospitalized for ulcerative colitis for May 3rd through May 9th.  Diarrhea and bleeding.  Steroid and antibiotics.  On Lomotil.  Long-term steroid use tapering dose now.  Down to 4 pills per day.  Slight diarrhea and bleeding still.  On omvoh.  Started it in April.  No beneficial effect so far.  Diabetes mellitus type 2 sugar in the 150s A1c 5.7 on March 16th.  BMI is 45.6 down slightly.  B12 deficiency level is 344.      Physical examination.  Vital signs noted.  Neck without bruit.  Chest clear.  Heart regular rate and rhythm.  Abdomen bowel sounds positive diffusely tender.  Extremities without edema.  Positive pulses.        Objective:        Assessment:       1. Type 2 diabetes mellitus without complication, without long-term current use of insulin    2. Vitamin B12 deficiency    3. BMI 45.0-49.9, adult    4. Long term systemic steroid user    5. Ulcerative colitis with rectal bleeding, unspecified location        Plan:       Type 2 diabetes mellitus without complication, without long-term current use of insulin  -     Basic Metabolic Panel; Future; Expected date: 05/17/2024  -     CBC Auto Differential; Future; Expected date: 05/17/2024    Vitamin B12 deficiency    BMI 45.0-49.9, adult    Long term systemic steroid user    Ulcerative colitis with rectal bleeding, unspecified location        Continue tapering steroids.  BMP CBC follow-up with gastroenterology.  See me in 1 month.  Infusion next week as planned.  All care gaps addressed.

## 2024-05-23 ENCOUNTER — LAB VISIT (OUTPATIENT)
Dept: LAB | Facility: HOSPITAL | Age: 49
End: 2024-05-23
Attending: FAMILY MEDICINE
Payer: COMMERCIAL

## 2024-05-23 DIAGNOSIS — E11.9 TYPE 2 DIABETES MELLITUS WITHOUT COMPLICATION, WITHOUT LONG-TERM CURRENT USE OF INSULIN: ICD-10-CM

## 2024-05-23 LAB
ANION GAP SERPL CALC-SCNC: 8 MMOL/L (ref 8–16)
BASOPHILS # BLD AUTO: 0.03 K/UL (ref 0–0.2)
BASOPHILS NFR BLD: 0.1 % (ref 0–1.9)
BUN SERPL-MCNC: 19 MG/DL (ref 6–20)
CALCIUM SERPL-MCNC: 9.2 MG/DL (ref 8.7–10.5)
CHLORIDE SERPL-SCNC: 100 MMOL/L (ref 95–110)
CO2 SERPL-SCNC: 30 MMOL/L (ref 23–29)
CREAT SERPL-MCNC: 1.1 MG/DL (ref 0.5–1.4)
DIFFERENTIAL METHOD BLD: ABNORMAL
EOSINOPHIL # BLD AUTO: 0.2 K/UL (ref 0–0.5)
EOSINOPHIL NFR BLD: 0.7 % (ref 0–8)
ERYTHROCYTE [DISTWIDTH] IN BLOOD BY AUTOMATED COUNT: 16.7 % (ref 11.5–14.5)
EST. GFR  (NO RACE VARIABLE): >60 ML/MIN/1.73 M^2
GLUCOSE SERPL-MCNC: 85 MG/DL (ref 70–110)
HCT VFR BLD AUTO: 41.2 % (ref 40–54)
HGB BLD-MCNC: 12.9 G/DL (ref 14–18)
IMM GRANULOCYTES # BLD AUTO: 0.27 K/UL (ref 0–0.04)
IMM GRANULOCYTES NFR BLD AUTO: 1.1 % (ref 0–0.5)
LYMPHOCYTES # BLD AUTO: 3.2 K/UL (ref 1–4.8)
LYMPHOCYTES NFR BLD: 12.9 % (ref 18–48)
MCH RBC QN AUTO: 31.2 PG (ref 27–31)
MCHC RBC AUTO-ENTMCNC: 31.3 G/DL (ref 32–36)
MCV RBC AUTO: 100 FL (ref 82–98)
MONOCYTES # BLD AUTO: 1.4 K/UL (ref 0.3–1)
MONOCYTES NFR BLD: 5.7 % (ref 4–15)
NEUTROPHILS # BLD AUTO: 19.9 K/UL (ref 1.8–7.7)
NEUTROPHILS NFR BLD: 79.5 % (ref 38–73)
NRBC BLD-RTO: 0 /100 WBC
PLATELET # BLD AUTO: 317 K/UL (ref 150–450)
PMV BLD AUTO: 9.5 FL (ref 9.2–12.9)
POTASSIUM SERPL-SCNC: 4.4 MMOL/L (ref 3.5–5.1)
RBC # BLD AUTO: 4.14 M/UL (ref 4.6–6.2)
SODIUM SERPL-SCNC: 138 MMOL/L (ref 136–145)
WBC # BLD AUTO: 24.99 K/UL (ref 3.9–12.7)

## 2024-05-23 PROCEDURE — 85025 COMPLETE CBC W/AUTO DIFF WBC: CPT | Performed by: FAMILY MEDICINE

## 2024-05-23 PROCEDURE — 36415 COLL VENOUS BLD VENIPUNCTURE: CPT | Performed by: FAMILY MEDICINE

## 2024-05-23 PROCEDURE — 80048 BASIC METABOLIC PNL TOTAL CA: CPT | Performed by: FAMILY MEDICINE

## 2024-05-24 DIAGNOSIS — F41.9 ANXIETY: ICD-10-CM

## 2024-05-24 RX ORDER — LORAZEPAM 0.5 MG/1
0.5 TABLET ORAL EVERY 12 HOURS PRN
Qty: 30 TABLET | Refills: 0 | Status: SHIPPED | OUTPATIENT
Start: 2024-05-24

## 2024-06-01 ENCOUNTER — HOSPITAL ENCOUNTER (EMERGENCY)
Facility: HOSPITAL | Age: 49
Discharge: HOME | End: 2024-06-01
Attending: EMERGENCY MEDICINE
Payer: COMMERCIAL

## 2024-06-01 VITALS
WEIGHT: 270 LBS | HEART RATE: 83 BPM | OXYGEN SATURATION: 95 % | DIASTOLIC BLOOD PRESSURE: 63 MMHG | HEIGHT: 65 IN | BODY MASS INDEX: 44.98 KG/M2 | TEMPERATURE: 98 F | SYSTOLIC BLOOD PRESSURE: 120 MMHG | RESPIRATION RATE: 18 BRPM

## 2024-06-01 DIAGNOSIS — K52.9 COLITIS: Primary | ICD-10-CM

## 2024-06-01 LAB
ALBUMIN SERPL BCP-MCNC: 4 G/DL (ref 3.5–5.2)
ALP SERPL-CCNC: 70 U/L (ref 55–135)
ALT SERPL W/O P-5'-P-CCNC: 15 U/L (ref 10–44)
ANION GAP SERPL CALC-SCNC: 14 MMOL/L (ref 8–16)
AST SERPL-CCNC: 13 U/L (ref 10–40)
BACTERIA #/AREA URNS HPF: NORMAL /HPF
BASOPHILS # BLD AUTO: 0.07 K/UL (ref 0–0.2)
BASOPHILS NFR BLD: 0.4 % (ref 0–1.9)
BILIRUB SERPL-MCNC: 0.4 MG/DL (ref 0.1–1)
BILIRUB UR QL STRIP: NEGATIVE
BUN SERPL-MCNC: 15 MG/DL (ref 6–20)
CALCIUM SERPL-MCNC: 9.6 MG/DL (ref 8.7–10.5)
CHLORIDE SERPL-SCNC: 100 MMOL/L (ref 95–110)
CLARITY UR: CLEAR
CO2 SERPL-SCNC: 19 MMOL/L (ref 23–29)
COLOR UR: YELLOW
CREAT SERPL-MCNC: 1.3 MG/DL (ref 0.5–1.4)
CREAT SERPL-MCNC: 1.4 MG/DL (ref 0.5–1.4)
DIFFERENTIAL METHOD BLD: ABNORMAL
EOSINOPHIL # BLD AUTO: 0.2 K/UL (ref 0–0.5)
EOSINOPHIL NFR BLD: 1.3 % (ref 0–8)
ERYTHROCYTE [DISTWIDTH] IN BLOOD BY AUTOMATED COUNT: 16.2 % (ref 11.5–14.5)
EST. GFR  (NO RACE VARIABLE): >60 ML/MIN/1.73 M^2
GLUCOSE SERPL-MCNC: 108 MG/DL (ref 70–110)
GLUCOSE UR QL STRIP: NEGATIVE
HCT VFR BLD AUTO: 44.8 % (ref 40–54)
HGB BLD-MCNC: 14.3 G/DL (ref 14–18)
HGB UR QL STRIP: NEGATIVE
HYALINE CASTS #/AREA URNS LPF: 0 /LPF
IMM GRANULOCYTES # BLD AUTO: 0.1 K/UL (ref 0–0.04)
IMM GRANULOCYTES NFR BLD AUTO: 0.6 % (ref 0–0.5)
KETONES UR QL STRIP: ABNORMAL
LEUKOCYTE ESTERASE UR QL STRIP: NEGATIVE
LIPASE SERPL-CCNC: 6 U/L (ref 4–60)
LYMPHOCYTES # BLD AUTO: 1.6 K/UL (ref 1–4.8)
LYMPHOCYTES NFR BLD: 9.8 % (ref 18–48)
MCH RBC QN AUTO: 31.4 PG (ref 27–31)
MCHC RBC AUTO-ENTMCNC: 31.9 G/DL (ref 32–36)
MCV RBC AUTO: 99 FL (ref 82–98)
MICROSCOPIC COMMENT: NORMAL
MONOCYTES # BLD AUTO: 1.1 K/UL (ref 0.3–1)
MONOCYTES NFR BLD: 6.8 % (ref 4–15)
NEUTROPHILS # BLD AUTO: 12.9 K/UL (ref 1.8–7.7)
NEUTROPHILS NFR BLD: 81.1 % (ref 38–73)
NITRITE UR QL STRIP: NEGATIVE
NRBC BLD-RTO: 0 /100 WBC
PH UR STRIP: 6 [PH] (ref 5–8)
PLATELET # BLD AUTO: 316 K/UL (ref 150–450)
PMV BLD AUTO: 9.6 FL (ref 9.2–12.9)
POTASSIUM SERPL-SCNC: 4.6 MMOL/L (ref 3.5–5.1)
PROT SERPL-MCNC: 7.7 G/DL (ref 6–8.4)
PROT UR QL STRIP: ABNORMAL
RBC # BLD AUTO: 4.55 M/UL (ref 4.6–6.2)
RBC #/AREA URNS HPF: 1 /HPF (ref 0–4)
SAMPLE: NORMAL
SODIUM SERPL-SCNC: 133 MMOL/L (ref 136–145)
SP GR UR STRIP: >1.03 (ref 1–1.03)
SQUAMOUS #/AREA URNS HPF: 1 /HPF
URN SPEC COLLECT METH UR: ABNORMAL
UROBILINOGEN UR STRIP-ACNC: NEGATIVE EU/DL
WBC # BLD AUTO: 15.97 K/UL (ref 3.9–12.7)
WBC #/AREA URNS HPF: 1 /HPF (ref 0–5)

## 2024-06-01 PROCEDURE — 99285 EMERGENCY DEPT VISIT HI MDM: CPT | Mod: 25

## 2024-06-01 PROCEDURE — 85025 COMPLETE CBC W/AUTO DIFF WBC: CPT | Performed by: NURSE PRACTITIONER

## 2024-06-01 PROCEDURE — 83690 ASSAY OF LIPASE: CPT | Performed by: NURSE PRACTITIONER

## 2024-06-01 PROCEDURE — 81001 URINALYSIS AUTO W/SCOPE: CPT | Performed by: NURSE PRACTITIONER

## 2024-06-01 PROCEDURE — 80053 COMPREHEN METABOLIC PANEL: CPT | Performed by: NURSE PRACTITIONER

## 2024-06-01 PROCEDURE — 63600175 PHARM REV CODE 636 W HCPCS: Performed by: NURSE PRACTITIONER

## 2024-06-01 PROCEDURE — 25000003 PHARM REV CODE 250: Performed by: NURSE PRACTITIONER

## 2024-06-01 PROCEDURE — 25500020 PHARM REV CODE 255: Performed by: NURSE PRACTITIONER

## 2024-06-01 PROCEDURE — 82565 ASSAY OF CREATININE: CPT

## 2024-06-01 PROCEDURE — 96375 TX/PRO/DX INJ NEW DRUG ADDON: CPT

## 2024-06-01 PROCEDURE — 96361 HYDRATE IV INFUSION ADD-ON: CPT

## 2024-06-01 PROCEDURE — 96374 THER/PROPH/DIAG INJ IV PUSH: CPT

## 2024-06-01 RX ORDER — HYDROMORPHONE HYDROCHLORIDE 1 MG/ML
1 INJECTION, SOLUTION INTRAMUSCULAR; INTRAVENOUS; SUBCUTANEOUS
Status: COMPLETED | OUTPATIENT
Start: 2024-06-01 | End: 2024-06-01

## 2024-06-01 RX ORDER — ONDANSETRON HYDROCHLORIDE 2 MG/ML
4 INJECTION, SOLUTION INTRAVENOUS
Status: COMPLETED | OUTPATIENT
Start: 2024-06-01 | End: 2024-06-01

## 2024-06-01 RX ORDER — HYDROCODONE BITARTRATE AND ACETAMINOPHEN 5; 325 MG/1; MG/1
1 TABLET ORAL EVERY 6 HOURS PRN
Qty: 6 TABLET | Refills: 0 | Status: ON HOLD | OUTPATIENT
Start: 2024-06-01 | End: 2024-06-08

## 2024-06-01 RX ORDER — KETOROLAC TROMETHAMINE 30 MG/ML
15 INJECTION, SOLUTION INTRAMUSCULAR; INTRAVENOUS
Status: COMPLETED | OUTPATIENT
Start: 2024-06-01 | End: 2024-06-01

## 2024-06-01 RX ADMIN — SODIUM CHLORIDE 1000 ML: 9 INJECTION, SOLUTION INTRAVENOUS at 03:06

## 2024-06-01 RX ADMIN — IOHEXOL 100 ML: 350 INJECTION, SOLUTION INTRAVENOUS at 03:06

## 2024-06-01 RX ADMIN — KETOROLAC TROMETHAMINE 15 MG: 30 INJECTION, SOLUTION INTRAMUSCULAR; INTRAVENOUS at 03:06

## 2024-06-01 RX ADMIN — SODIUM CHLORIDE 1000 ML: 9 INJECTION, SOLUTION INTRAVENOUS at 07:06

## 2024-06-01 RX ADMIN — ONDANSETRON 4 MG: 2 INJECTION INTRAMUSCULAR; INTRAVENOUS at 07:06

## 2024-06-01 RX ADMIN — HYDROMORPHONE HYDROCHLORIDE 1 MG: 1 INJECTION, SOLUTION INTRAMUSCULAR; INTRAVENOUS; SUBCUTANEOUS at 04:06

## 2024-06-01 NOTE — FIRST PROVIDER EVALUATION
Emergency Department TeleTriage Encounter Note      CHIEF COMPLAINT    Chief Complaint   Patient presents with    Abdominal Pain    Diarrhea    Vomiting       VITAL SIGNS   Initial Vitals [06/01/24 1454]   BP Pulse Resp Temp SpO2   (!) 142/92 (!) 117 18 98.1 °F (36.7 °C) 97 %      MAP       --            ALLERGIES    Review of patient's allergies indicates:  No Known Allergies    PROVIDER TRIAGE NOTE  Verbal consent for the teletriage evaluation was given by the patient at the start of the evaluation.  All efforts will be made to maintain patient's privacy during the evaluation.      This is a teletriage evaluation of a 49 y.o. male presenting to the ED with c/o abdominal pain, nausea, vomiting, and diarrhea for 3 days.  Feels like he having a UC flare.  Some blood in stool also. Limited physical exam via telehealth: The patient is awake, alert, answering questions appropriately and is not in respiratory distress.  As the Teletriage provider, I performed an initial assessment and ordered appropriate labs and imaging studies, if any, to facilitate the patient's care once placed in the ED. Once a room is available, care and a full evaluation will be completed by an alternate ED provider.  Any additional orders and the final disposition will be determined by that provider.  All imaging and labs will not be followed-up by the Teletriage Team, including myself.          ORDERS  Labs Reviewed   CBC W/ AUTO DIFFERENTIAL   COMPREHENSIVE METABOLIC PANEL   LIPASE   URINALYSIS, REFLEX TO URINE CULTURE       ED Orders (720h ago, onward)      Start Ordered     Status Ordering Provider    06/01/24 1459 06/01/24 1458  Vital signs  Every 2 hours         Ordered STEVE CORCORAN    06/01/24 1459 06/01/24 1458  Diet NPO  Diet effective now         Ordered STEVE CORCORAN    06/01/24 1459 06/01/24 1458  Insert peripheral IV  Once         Ordered STEVE CORCORAN    06/01/24 1459 06/01/24 1458  CBC W/ AUTO DIFFERENTIAL  STAT         Ordered  STEVE CORCORAN    06/01/24 1459 06/01/24 1458  Comp. Metabolic Panel  STAT         Ordered STEVE CORCORAN    06/01/24 1459 06/01/24 1458  Lipase  STAT         Ordered STEVE CORCORAN    06/01/24 1459 06/01/24 1458  Urinalysis, Reflex to Urine Culture Urine, Clean Catch  STAT         Ordered STEVE CORCORAN ESE              Virtual Visit Note: The provider triage portion of this emergency department evaluation and documentation was performed via [employer], a HIPAA-compliant telemedicine application, in concert with a tele-presenter in the room. A face to face patient evaluation with one of my colleagues will occur once the patient is placed in an emergency department room.      DISCLAIMER: This note was prepared with path intelligence voice recognition transcription software. Garbled syntax, mangled pronouns, and other bizarre constructions may be attributed to that software system.

## 2024-06-01 NOTE — ED PROVIDER NOTES
Encounter Date: 6/1/2024       History     Chief Complaint   Patient presents with    Abdominal Pain    Diarrhea    Vomiting     Presents with complaint of left lower quadrant abdominal pain.  Patient reports diarrhea and vomiting.  Onset 3 days.  Patient has a history ulcerative colitis.  Patient reports he can not keep liquids down.  Denies fever.  Patient was hospitalized here on 05/03 and discharged on 05/09.  He was diagnosed with ulcerative colitis.  This has a known diagnosis to him.  He was instructed to see his GI doctor.  He did see Dr. Avitia on the 15th.  He has started a new infusion called [facility].  He had an infusion in April and May and he will have his 3rd infusion in June and then they will start him on the pin.  He was not given antibiotics here in the hospital for his ulcerative colitis as he has a problem with C diff. they left that up to his GI doctor.  Dr. ALTAMIRANO DID NOT PLACE HIM ON ANTIBIOTICS EITHER.      Review of patient's allergies indicates:  No Known Allergies  Past Medical History:   Diagnosis Date    C. difficile colitis     Cavitary pneumonia 11/2023    pos aspergillus serology    Diabetes mellitus 01/2022    Hyperlipidemia 2015    Hypertension 2015    Insomnia 2015    Ulcerative colitis      Past Surgical History:   Procedure Laterality Date    BRONCHOSCOPY WITH FLUOROSCOPY Left 11/20/2023    Procedure: BRONCHOSCOPY, WITH FLUOROSCOPY;  Surgeon: Lisa Villarreal MD;  Location: [facility] ENDO;  Service: Pulmonary;  Laterality: Left;    BRONCHOSCOPY WITH FLUOROSCOPY N/A 11/30/2023    Procedure: BRONCHOSCOPY, WITH FLUOROSCOPY;  Surgeon: Lisa Villarreal MD;  Location: [facility];  Service: Pulmonary;  Laterality: N/A;    CARDIAC ELECTROPHYSIOLOGY MAPPING AND ABLATION  2017    SVT    CHOLECYSTECTOMY  2011    COLONOSCOPY N/A 5/3/2022    Procedure: COLONOSCOPY;  Surgeon: Shan Jean III, MD;  Location: [facility];  Service: Endoscopy;  Laterality: N/A;    COLONOSCOPY N/A 3/16/2024     Procedure: COLONOSCOPY;  Surgeon: ALEKSANDER Ramos MD;  Location: [facility] ENDO;  Service: Endoscopy;  Laterality: N/A;    COLONOSCOPY WITH FECAL MICROBIOTA TRANSFER N/A 3/21/2023    Procedure: COLONOSCOPY, WITH FECAL MICROBIOTA TRANSFER;  Surgeon: David Millan MD;  Location: [facility] ENDO;  Service: Endoscopy;  Laterality: N/A;    ESOPHAGOGASTRODUODENOSCOPY N/A 4/24/2023    Procedure: EGD (ESOPHAGOGASTRODUODENOSCOPY);  Surgeon: Shan Jean III, MD;  Location: [facility] ENDO;  Service: Endoscopy;  Laterality: N/A;    GANGLION CYST EXCISION Left 1992    UMBILICAL HERNIA REPAIR  2011    with mesh     Family History   Problem Relation Name Age of Onset    Asthma Mother       Social History     Tobacco Use    Smoking status: Former     Current packs/day: 1.00     Average packs/day: 1 pack/day for 31.4 years (31.4 ttl pk-yrs)     Types: Cigarettes     Start date: 1993    Smokeless tobacco: Never    Tobacco comments:     Pt states he has stopped smoking with Chantix three weeks ago. 12/1/23   Substance Use Topics    Alcohol use: Yes     Comment: ocass    Drug use: Not Currently     Review of Systems   Constitutional:  Negative for chills and fever.   Respiratory:  Negative for cough, shortness of breath and wheezing.    Cardiovascular:  Negative for chest pain, palpitations and leg swelling.   Gastrointestinal:  Positive for abdominal pain, diarrhea, nausea and vomiting. Negative for constipation.   Genitourinary:  Negative for difficulty urinating, dysuria, frequency and hematuria.   Musculoskeletal:  Negative for back pain, gait problem and neck pain.   Skin:  Negative for rash.   Neurological:  Negative for weakness.       Physical Exam     Initial Vitals [06/01/24 1454]   BP Pulse Resp Temp SpO2   (!) 142/92 (!) 117 18 98.1 °F (36.7 °C) 97 %      MAP       --         Physical Exam    Constitutional: He appears well-developed and well-nourished.   HENT:   Head: Normocephalic.   Eyes: Conjunctivae are normal.    Neck: Neck supple.   Normal range of motion.  Cardiovascular:  Normal rate and regular rhythm.           Patient was tachycardic.   Pulmonary/Chest: No respiratory distress.   Abdominal: Abdomen is soft. Bowel sounds are normal. He exhibits no distension. There is abdominal tenderness.   Generalized tenderness.  Pain worse left lower quadrant.  Bowel sounds are positive.  No guarding no rebound There is no rebound and no guarding.   Musculoskeletal:      Cervical back: Normal range of motion and neck supple.      Comments: Patient was ambulatory per self.  His gait is steady.  He moves all extremities without difficulty.  Patient is morbidly obese.     Neurological: No sensory deficit. GCS score is 15. GCS eye subscore is 4. GCS verbal subscore is 5. GCS motor subscore is 6.   Skin: Capillary refill takes less than 2 seconds.   Psychiatric: He has a normal mood and affect. Thought content normal.         ED Course   Procedures  Labs Reviewed   CBC W/ AUTO DIFFERENTIAL - Abnormal; Notable for the following components:       Result Value    WBC 15.97 (*)     RBC 4.55 (*)     MCV 99 (*)     MCH 31.4 (*)     MCHC 31.9 (*)     RDW 16.2 (*)     Immature Granulocytes 0.6 (*)     Gran # (ANC) 12.9 (*)     Immature Grans (Abs) 0.10 (*)     Mono # 1.1 (*)     Gran % 81.1 (*)     Lymph % 9.8 (*)     All other components within normal limits   COMPREHENSIVE METABOLIC PANEL - Abnormal; Notable for the following components:    Sodium 133 (*)     CO2 19 (*)     All other components within normal limits   URINALYSIS, REFLEX TO URINE CULTURE - Abnormal; Notable for the following components:    Specific Gravity, UA >1.030 (*)     Protein, UA 1+ (*)     Ketones, UA 1+ (*)     All other components within normal limits    Narrative:     Specimen Source->Urine   LIPASE   URINALYSIS MICROSCOPIC    Narrative:     Specimen Source->Urine   ISTAT CREATININE   POCT CREATININE          Imaging Results              CT Abdomen Pelvis With IV  Contrast NO Oral Contrast (Final result)  Result time 06/01/24 16:49:31      Final result by Elfego Lowery MD (06/01/24 16:49:31)                   Impression:      1. There is nondistention with mild wall thickening and mild stranding of the rectum, sigmoid colon, descending colon and transverse colon could represent mild colitis.  Recommend follow-up.  2. Mild hepatomegaly.  3. Bilateral nonobstructing nephrolithiasis left greater than right.      Electronically signed by: Elfego Lowery  Date:    06/01/2024  Time:    16:49               Narrative:    EXAMINATION:  CT ABDOMEN PELVIS WITH IV CONTRAST    CLINICAL HISTORY:  LLQ abdominal pain;    TECHNIQUE:  Low dose axial images, sagittal and coronal reformations were obtained from the lung bases to the pubic symphysis following the IV administration of 100 mL of Omnipaque 350 .  Oral contrast was not administered.    COMPARISON:  05/03/2024    FINDINGS:  Abdomen:    - Lower thorax:    - Lung bases: No infiltrates and no nodules.    - Liver: The liver is mildly enlarged.  No focal abnormality.    - Gallbladder: Status post cholecystectomy.    - Bile Ducts: No evidence of intra or extra hepatic biliary ductal dilation.    - Spleen: Negative.    - Kidneys: Bilateral nonobstructing nephrolithiasis left greater than right.  No mass, hydronephrosis or hydroureter bilaterally.    - Adrenals: Unremarkable.    - Pancreas: No mass or peripancreatic fat stranding.    Duodenal diverticulum near the ampulla.    - Retroperitoneum:  No significant adenopathy.    - Vascular: No abdominal aortic aneurysm.    - Abdominal wall:  Unremarkable.    Pelvis:    No pelvic mass, adenopathy, or free fluid.    Bowel/Mesentery:    No evidence of bowel obstruction.  There is nondistention with mild wall thickening and mild stranding of the rectum, sigmoid colon, descending colon and transverse colon could represent mild colitis.  Findings are similar to the prior study.    Bones:  No acute  osseous abnormality and no suspicious lytic or blastic lesion.                                       Medications   ketorolac injection 15 mg (15 mg Intravenous Given 6/1/24 1519)   sodium chloride 0.9% bolus 1,000 mL 1,000 mL (0 mLs Intravenous Stopped 6/1/24 1620)   iohexoL (OMNIPAQUE 350) injection 100 mL (100 mLs Intravenous Given 6/1/24 1550)   HYDROmorphone injection 1 mg (1 mg Intravenous Given 6/1/24 1642)   ondansetron injection 4 mg (4 mg Intravenous Given 6/1/24 1909)   sodium chloride 0.9% bolus 1,000 mL 1,000 mL (0 mLs Intravenous Stopped 6/1/24 2203)     Medical Decision Making  Presents with complaint of abdominal pain.  Worse left lower quadrant.  Onset 3 days.  Patient also reports vomiting diarrhea along with the abdominal pain.  He reports he can not keep liquids down.  He denies fever.  Patient has a history of ulcerative colitis.    Amount and/or Complexity of Data Reviewed  Labs:      Details: Patient's white count today is 15.9.  Nine days ago was 24.  His sodium was 133.  Urine was cleaned with the exception of +1 protein.    Radiology: ordered.     Details: CT of the abdomen reports there is nondistended mild wall thickening and mild stranding of the rectum sigmoid colon descending colon and transverse colon which could represent mild colitis.  Mild hepatomegaly a bilateral nonobstructing stones left greater than right  Discussion of management or test interpretation with external provider(s): Patient was given 50 mg of Toradol IV for his pain.  Once he was roomed he was given Zofran 4 mg and Dilaudid 1 mg IV.  He is received 2 L of normal saline while here in the ED.  He is passed his p.o. challenge.  This patient has been a challenge as  he wanted to be admitted.  He wanted to be admitted for pain control.  This patient was here in the ED for 7 hours.  Each time I went into the room he was lying comfortably in the bed speaking with me.  I have consulted Hospital Medicine [facility]  Medicine nor myself feel this gentleman meets criteria to be admitted.  He drove himself here in will be driving himself home.  He he was never writhing in pain.  I gave him 2 days of Norco 5 mg for home use.  I instructed him to follow up with Dr. Beck on Monday.  Once I said up this plan as to sending him home with narcotics he appeared to be okay with the plan.  He was given strict return precautions.  At discharge this patient appeared to be in no acute distress.    Risk  Prescription drug management.                                      Clinical Impression:  Final diagnoses:  [K52.9] Colitis (Primary)          ED Disposition Condition    Discharge Stable          ED Prescriptions       Medication Sig Dispense Start Date End Date Auth. Provider    HYDROcodone-acetaminophen (NORCO) 5-325 mg per tablet Take 1 tablet by mouth every 6 (six) hours as needed for Pain. 6 tablet 6/1/2024 -- Sarah Stevenson NP          Follow-up Information       Follow up With Specialties Details Why Contact Info    Hunter Aguilar III, MD Family Medicine In 3 days  [address] [facility]  [address] [place] [address]  [phone]      Shan Jean III, MD Gastroenterology In 2 days  [address] [facility]  [facility]  [facility] [address] [phone]               Sarah Stevenson NP  06/01/24 9845

## 2024-06-02 NOTE — DISCHARGE INSTRUCTIONS
Take your pain medication as instructed.  Call Dr. Adama campos on Monday for further evaluation.  Return to the ED for any worsening of symptoms or any other concerns.  Take the Phenergan you have at home for nausea.

## 2024-06-03 ENCOUNTER — HOSPITAL ENCOUNTER (INPATIENT)
Facility: HOSPITAL | Age: 49
LOS: 4 days | Discharge: HOME | DRG: 386 | End: 2024-06-08
Attending: STUDENT IN AN ORGANIZED HEALTH CARE EDUCATION/TRAINING PROGRAM | Admitting: HOSPITALIST
Payer: COMMERCIAL

## 2024-06-03 ENCOUNTER — PATIENT OUTREACH (OUTPATIENT)
Dept: EMERGENCY MEDICINE | Facility: OTHER | Age: 49
End: 2024-06-03
Payer: COMMERCIAL

## 2024-06-03 DIAGNOSIS — K51.90: ICD-10-CM

## 2024-06-03 DIAGNOSIS — K52.9 COLITIS: Primary | ICD-10-CM

## 2024-06-03 DIAGNOSIS — R07.9 CHEST PAIN: ICD-10-CM

## 2024-06-03 LAB
ALBUMIN SERPL BCP-MCNC: 3.6 G/DL (ref 3.5–5.2)
ALP SERPL-CCNC: 60 U/L (ref 55–135)
ALT SERPL W/O P-5'-P-CCNC: 13 U/L (ref 10–44)
ANION GAP SERPL CALC-SCNC: 11 MMOL/L (ref 8–16)
AST SERPL-CCNC: 7 U/L (ref 10–40)
BASOPHILS # BLD AUTO: 0.04 K/UL (ref 0–0.2)
BASOPHILS NFR BLD: 0.5 % (ref 0–1.9)
BILIRUB SERPL-MCNC: 0.3 MG/DL (ref 0.1–1)
BUN SERPL-MCNC: 9 MG/DL (ref 6–20)
CALCIUM SERPL-MCNC: 9.3 MG/DL (ref 8.7–10.5)
CHLORIDE SERPL-SCNC: 104 MMOL/L (ref 95–110)
CO2 SERPL-SCNC: 20 MMOL/L (ref 23–29)
CREAT SERPL-MCNC: 1.2 MG/DL (ref 0.5–1.4)
CRP SERPL-MCNC: 6.9 MG/DL
DIFFERENTIAL METHOD BLD: ABNORMAL
EOSINOPHIL # BLD AUTO: 0.3 K/UL (ref 0–0.5)
EOSINOPHIL NFR BLD: 3.3 % (ref 0–8)
ERYTHROCYTE [DISTWIDTH] IN BLOOD BY AUTOMATED COUNT: 15.9 % (ref 11.5–14.5)
ERYTHROCYTE [SEDIMENTATION RATE] IN BLOOD BY WESTERGREN METHOD: 46 MM/HR (ref 0–10)
EST. GFR  (NO RACE VARIABLE): >60 ML/MIN/1.73 M^2
GLUCOSE SERPL-MCNC: 95 MG/DL (ref 70–110)
HCT VFR BLD AUTO: 40.9 % (ref 40–54)
HGB BLD-MCNC: 13 G/DL (ref 14–18)
IMM GRANULOCYTES # BLD AUTO: 0.05 K/UL (ref 0–0.04)
IMM GRANULOCYTES NFR BLD AUTO: 0.6 % (ref 0–0.5)
LIPASE SERPL-CCNC: <3 U/L (ref 4–60)
LYMPHOCYTES # BLD AUTO: 1.4 K/UL (ref 1–4.8)
LYMPHOCYTES NFR BLD: 16.5 % (ref 18–48)
MCH RBC QN AUTO: 31.6 PG (ref 27–31)
MCHC RBC AUTO-ENTMCNC: 31.8 G/DL (ref 32–36)
MCV RBC AUTO: 100 FL (ref 82–98)
MONOCYTES # BLD AUTO: 0.8 K/UL (ref 0.3–1)
MONOCYTES NFR BLD: 9.6 % (ref 4–15)
NEUTROPHILS # BLD AUTO: 6.1 K/UL (ref 1.8–7.7)
NEUTROPHILS NFR BLD: 69.5 % (ref 38–73)
NRBC BLD-RTO: 0 /100 WBC
PLATELET # BLD AUTO: 283 K/UL (ref 150–450)
PMV BLD AUTO: 9.5 FL (ref 9.2–12.9)
POTASSIUM SERPL-SCNC: 3.7 MMOL/L (ref 3.5–5.1)
PROT SERPL-MCNC: 7 G/DL (ref 6–8.4)
RBC # BLD AUTO: 4.11 M/UL (ref 4.6–6.2)
SODIUM SERPL-SCNC: 135 MMOL/L (ref 136–145)
WBC # BLD AUTO: 8.72 K/UL (ref 3.9–12.7)

## 2024-06-03 PROCEDURE — 86140 C-REACTIVE PROTEIN: CPT | Performed by: PHYSICIAN ASSISTANT

## 2024-06-03 PROCEDURE — 80053 COMPREHEN METABOLIC PANEL: CPT | Performed by: PHYSICIAN ASSISTANT

## 2024-06-03 PROCEDURE — 96374 THER/PROPH/DIAG INJ IV PUSH: CPT

## 2024-06-03 PROCEDURE — 85651 RBC SED RATE NONAUTOMATED: CPT | Performed by: PHYSICIAN ASSISTANT

## 2024-06-03 PROCEDURE — 99285 EMERGENCY DEPT VISIT HI MDM: CPT

## 2024-06-03 PROCEDURE — 63600175 PHARM REV CODE 636 W HCPCS: Performed by: STUDENT IN AN ORGANIZED HEALTH CARE EDUCATION/TRAINING PROGRAM

## 2024-06-03 PROCEDURE — 85025 COMPLETE CBC W/AUTO DIFF WBC: CPT | Performed by: PHYSICIAN ASSISTANT

## 2024-06-03 PROCEDURE — 25000003 PHARM REV CODE 250: Performed by: STUDENT IN AN ORGANIZED HEALTH CARE EDUCATION/TRAINING PROGRAM

## 2024-06-03 PROCEDURE — 81003 URINALYSIS AUTO W/O SCOPE: CPT | Performed by: PHYSICIAN ASSISTANT

## 2024-06-03 PROCEDURE — 83690 ASSAY OF LIPASE: CPT | Performed by: PHYSICIAN ASSISTANT

## 2024-06-03 RX ORDER — DICYCLOMINE HYDROCHLORIDE 10 MG/1
20 CAPSULE ORAL
Status: CANCELLED | OUTPATIENT
Start: 2024-06-03 | End: 2024-06-03

## 2024-06-03 RX ORDER — ONDANSETRON HYDROCHLORIDE 2 MG/ML
4 INJECTION, SOLUTION INTRAVENOUS
Status: COMPLETED | OUTPATIENT
Start: 2024-06-04 | End: 2024-06-03

## 2024-06-03 RX ORDER — OXYCODONE HYDROCHLORIDE 5 MG/1
5 TABLET ORAL
Status: COMPLETED | OUTPATIENT
Start: 2024-06-04 | End: 2024-06-03

## 2024-06-03 RX ORDER — MORPHINE SULFATE 4 MG/ML
4 INJECTION, SOLUTION INTRAMUSCULAR; INTRAVENOUS
Status: CANCELLED | OUTPATIENT
Start: 2024-06-03 | End: 2024-06-03

## 2024-06-03 RX ADMIN — OXYCODONE HYDROCHLORIDE 5 MG: 5 TABLET ORAL at 11:06

## 2024-06-03 RX ADMIN — SODIUM CHLORIDE 1000 ML: 9 INJECTION, SOLUTION INTRAVENOUS at 11:06

## 2024-06-03 RX ADMIN — ONDANSETRON 4 MG: 2 INJECTION INTRAMUSCULAR; INTRAVENOUS at 11:06

## 2024-06-03 NOTE — Clinical Note
Diagnosis: Colitis [339916]   Future Attending Provider: REFERRING, UNKNOWN [[id number]]   Place in Observation: On license of [facility] [[id number]]

## 2024-06-04 PROBLEM — F32.A DEPRESSION: Status: ACTIVE | Noted: 2024-06-04

## 2024-06-04 LAB
BILIRUB UR QL STRIP: NEGATIVE
CLARITY UR: CLEAR
COLOR UR: YELLOW
GLUCOSE UR QL STRIP: NEGATIVE
HGB UR QL STRIP: NEGATIVE
KETONES UR QL STRIP: NEGATIVE
LEUKOCYTE ESTERASE UR QL STRIP: NEGATIVE
NITRITE UR QL STRIP: NEGATIVE
PH UR STRIP: 6 [PH] (ref 5–8)
PROT UR QL STRIP: ABNORMAL
SP GR UR STRIP: >1.03 (ref 1–1.03)
URN SPEC COLLECT METH UR: ABNORMAL
UROBILINOGEN UR STRIP-ACNC: NEGATIVE EU/DL

## 2024-06-04 PROCEDURE — 63600175 PHARM REV CODE 636 W HCPCS: Performed by: STUDENT IN AN ORGANIZED HEALTH CARE EDUCATION/TRAINING PROGRAM

## 2024-06-04 PROCEDURE — 96372 THER/PROPH/DIAG INJ SC/IM: CPT | Performed by: INTERNAL MEDICINE

## 2024-06-04 PROCEDURE — 96361 HYDRATE IV INFUSION ADD-ON: CPT

## 2024-06-04 PROCEDURE — 63600175 PHARM REV CODE 636 W HCPCS: Performed by: INTERNAL MEDICINE

## 2024-06-04 PROCEDURE — 83993 ASSAY FOR CALPROTECTIN FECAL: CPT | Performed by: INTERNAL MEDICINE

## 2024-06-04 PROCEDURE — 25500020 PHARM REV CODE 255: Performed by: STUDENT IN AN ORGANIZED HEALTH CARE EDUCATION/TRAINING PROGRAM

## 2024-06-04 PROCEDURE — 96375 TX/PRO/DX INJ NEW DRUG ADDON: CPT

## 2024-06-04 PROCEDURE — 25000003 PHARM REV CODE 250: Performed by: INTERNAL MEDICINE

## 2024-06-04 PROCEDURE — 21400001 HC TELEMETRY ROOM

## 2024-06-04 RX ORDER — SODIUM,POTASSIUM PHOSPHATES 280-250MG
2 POWDER IN PACKET (EA) ORAL
Status: DISCONTINUED | OUTPATIENT
Start: 2024-06-04 | End: 2024-06-08 | Stop reason: HOSPADM

## 2024-06-04 RX ORDER — IBUPROFEN 200 MG
24 TABLET ORAL
Status: DISCONTINUED | OUTPATIENT
Start: 2024-06-04 | End: 2024-06-08 | Stop reason: HOSPADM

## 2024-06-04 RX ORDER — METOPROLOL SUCCINATE 50 MG/1
50 TABLET, EXTENDED RELEASE ORAL DAILY
Status: DISCONTINUED | OUTPATIENT
Start: 2024-06-04 | End: 2024-06-08 | Stop reason: HOSPADM

## 2024-06-04 RX ORDER — DICYCLOMINE HYDROCHLORIDE 10 MG/1
10 CAPSULE ORAL 3 TIMES DAILY PRN
Status: DISCONTINUED | OUTPATIENT
Start: 2024-06-04 | End: 2024-06-08 | Stop reason: HOSPADM

## 2024-06-04 RX ORDER — HYDROMORPHONE HYDROCHLORIDE 1 MG/ML
0.5 INJECTION, SOLUTION INTRAMUSCULAR; INTRAVENOUS; SUBCUTANEOUS EVERY 6 HOURS PRN
Status: COMPLETED | OUTPATIENT
Start: 2024-06-04 | End: 2024-06-06

## 2024-06-04 RX ORDER — ENOXAPARIN SODIUM 100 MG/ML
40 INJECTION SUBCUTANEOUS EVERY 24 HOURS
Status: DISCONTINUED | OUTPATIENT
Start: 2024-06-04 | End: 2024-06-04

## 2024-06-04 RX ORDER — ENOXAPARIN SODIUM 100 MG/ML
40 INJECTION SUBCUTANEOUS EVERY 12 HOURS
Status: DISCONTINUED | OUTPATIENT
Start: 2024-06-04 | End: 2024-06-08 | Stop reason: HOSPADM

## 2024-06-04 RX ORDER — LORAZEPAM 0.5 MG/1
0.5 TABLET ORAL EVERY 12 HOURS PRN
Status: DISCONTINUED | OUTPATIENT
Start: 2024-06-04 | End: 2024-06-08 | Stop reason: HOSPADM

## 2024-06-04 RX ORDER — ACETAMINOPHEN 325 MG/1
650 TABLET ORAL EVERY 4 HOURS PRN
Status: DISCONTINUED | OUTPATIENT
Start: 2024-06-04 | End: 2024-06-08 | Stop reason: HOSPADM

## 2024-06-04 RX ORDER — HYDROMORPHONE HYDROCHLORIDE 1 MG/ML
0.5 INJECTION, SOLUTION INTRAMUSCULAR; INTRAVENOUS; SUBCUTANEOUS
Status: COMPLETED | OUTPATIENT
Start: 2024-06-04 | End: 2024-06-04

## 2024-06-04 RX ORDER — ACETAMINOPHEN 325 MG/1
650 TABLET ORAL EVERY 8 HOURS PRN
Status: DISCONTINUED | OUTPATIENT
Start: 2024-06-04 | End: 2024-06-08 | Stop reason: HOSPADM

## 2024-06-04 RX ORDER — GLUCAGON 1 MG
1 KIT INJECTION
Status: DISCONTINUED | OUTPATIENT
Start: 2024-06-04 | End: 2024-06-08 | Stop reason: HOSPADM

## 2024-06-04 RX ORDER — PROMETHAZINE HYDROCHLORIDE 12.5 MG/1
12.5 TABLET ORAL 4 TIMES DAILY PRN
Status: ON HOLD | COMMUNITY
Start: 2024-05-28 | End: 2024-06-08 | Stop reason: HOSPADM

## 2024-06-04 RX ORDER — SYRING-NEEDL,DISP,INSUL,0.3 ML 29 G X1/2"
296 SYRINGE, EMPTY DISPOSABLE MISCELLANEOUS ONCE
Status: COMPLETED | OUTPATIENT
Start: 2024-06-05 | End: 2024-06-05

## 2024-06-04 RX ORDER — DROPERIDOL 2.5 MG/ML
1.25 INJECTION, SOLUTION INTRAMUSCULAR; INTRAVENOUS ONCE
Status: COMPLETED | OUTPATIENT
Start: 2024-06-04 | End: 2024-06-04

## 2024-06-04 RX ORDER — HYOSCYAMINE SULFATE 0.38 MG/1
0.38 TABLET, EXTENDED RELEASE ORAL 2 TIMES DAILY
COMMUNITY
Start: 2024-05-15

## 2024-06-04 RX ORDER — PROMETHAZINE HYDROCHLORIDE 25 MG/1
25 TABLET ORAL 4 TIMES DAILY PRN
Status: DISCONTINUED | OUTPATIENT
Start: 2024-06-04 | End: 2024-06-08 | Stop reason: HOSPADM

## 2024-06-04 RX ORDER — LANOLIN ALCOHOL/MO/W.PET/CERES
800 CREAM (GRAM) TOPICAL
Status: DISCONTINUED | OUTPATIENT
Start: 2024-06-04 | End: 2024-06-08 | Stop reason: HOSPADM

## 2024-06-04 RX ORDER — NALOXONE HCL 0.4 MG/ML
0.02 VIAL (ML) INJECTION
Status: DISCONTINUED | OUTPATIENT
Start: 2024-06-04 | End: 2024-06-08 | Stop reason: HOSPADM

## 2024-06-04 RX ORDER — VARENICLINE TARTRATE 0.5 MG/1
1 TABLET, FILM COATED ORAL 2 TIMES DAILY
Status: DISCONTINUED | OUTPATIENT
Start: 2024-06-04 | End: 2024-06-08 | Stop reason: HOSPADM

## 2024-06-04 RX ORDER — SODIUM CHLORIDE 9 MG/ML
INJECTION, SOLUTION INTRAVENOUS CONTINUOUS
Status: DISCONTINUED | OUTPATIENT
Start: 2024-06-04 | End: 2024-06-07

## 2024-06-04 RX ORDER — ZOLPIDEM TARTRATE 5 MG/1
10 TABLET ORAL NIGHTLY PRN
Status: DISCONTINUED | OUTPATIENT
Start: 2024-06-04 | End: 2024-06-08 | Stop reason: HOSPADM

## 2024-06-04 RX ORDER — CHOLESTYRAMINE 4 G/4.8G
1 POWDER, FOR SUSPENSION ORAL 2 TIMES DAILY
Status: DISCONTINUED | OUTPATIENT
Start: 2024-06-04 | End: 2024-06-08

## 2024-06-04 RX ORDER — IBUPROFEN 200 MG
16 TABLET ORAL
Status: DISCONTINUED | OUTPATIENT
Start: 2024-06-04 | End: 2024-06-08 | Stop reason: HOSPADM

## 2024-06-04 RX ORDER — HYDROCODONE BITARTRATE AND ACETAMINOPHEN 5; 325 MG/1; MG/1
1 TABLET ORAL EVERY 6 HOURS PRN
Status: DISCONTINUED | OUTPATIENT
Start: 2024-06-04 | End: 2024-06-07

## 2024-06-04 RX ORDER — SERTRALINE HYDROCHLORIDE 50 MG/1
100 TABLET, FILM COATED ORAL DAILY
Status: DISCONTINUED | OUTPATIENT
Start: 2024-06-04 | End: 2024-06-08 | Stop reason: HOSPADM

## 2024-06-04 RX ORDER — PANTOPRAZOLE SODIUM 40 MG/1
40 TABLET, DELAYED RELEASE ORAL 2 TIMES DAILY
Status: DISCONTINUED | OUTPATIENT
Start: 2024-06-04 | End: 2024-06-05

## 2024-06-04 RX ORDER — PREDNISONE 20 MG/1
40 TABLET ORAL
Status: COMPLETED | OUTPATIENT
Start: 2024-06-04 | End: 2024-06-04

## 2024-06-04 RX ORDER — SODIUM CHLORIDE 0.9 % (FLUSH) 0.9 %
2 SYRINGE (ML) INJECTION EVERY 12 HOURS PRN
Status: DISCONTINUED | OUTPATIENT
Start: 2024-06-04 | End: 2024-06-08 | Stop reason: HOSPADM

## 2024-06-04 RX ADMIN — ZOLPIDEM TARTRATE 10 MG: 5 TABLET, COATED ORAL at 10:06

## 2024-06-04 RX ADMIN — SODIUM CHLORIDE: 9 INJECTION, SOLUTION INTRAVENOUS at 02:06

## 2024-06-04 RX ADMIN — HYDROCODONE BITARTRATE AND ACETAMINOPHEN 1 TABLET: 5; 325 TABLET ORAL at 07:06

## 2024-06-04 RX ADMIN — BUSPIRONE HYDROCHLORIDE 7.5 MG: 5 TABLET ORAL at 08:06

## 2024-06-04 RX ADMIN — METOPROLOL SUCCINATE 50 MG: 50 TABLET, EXTENDED RELEASE ORAL at 09:06

## 2024-06-04 RX ADMIN — VARENICLINE TARTRATE 1 MG: 0.5 TABLET, FILM COATED ORAL at 09:06

## 2024-06-04 RX ADMIN — CHOLESTYRAMINE LIGHT 4 G: 4 POWDER, FOR SUSPENSION ORAL at 08:06

## 2024-06-04 RX ADMIN — SERTRALINE HYDROCHLORIDE 100 MG: 50 TABLET ORAL at 08:06

## 2024-06-04 RX ADMIN — DROPERIDOL 1.25 MG: 2.5 INJECTION, SOLUTION INTRAMUSCULAR; INTRAVENOUS at 04:06

## 2024-06-04 RX ADMIN — HYDROCODONE BITARTRATE AND ACETAMINOPHEN 1 TABLET: 5; 325 TABLET ORAL at 01:06

## 2024-06-04 RX ADMIN — BUSPIRONE HYDROCHLORIDE 7.5 MG: 5 TABLET ORAL at 02:06

## 2024-06-04 RX ADMIN — PROMETHAZINE HYDROCHLORIDE 25 MG: 25 TABLET ORAL at 10:06

## 2024-06-04 RX ADMIN — HYDROMORPHONE HYDROCHLORIDE 0.5 MG: 0.5 INJECTION, SOLUTION INTRAMUSCULAR; INTRAVENOUS; SUBCUTANEOUS at 08:06

## 2024-06-04 RX ADMIN — PANTOPRAZOLE SODIUM 40 MG: 40 TABLET, DELAYED RELEASE ORAL at 08:06

## 2024-06-04 RX ADMIN — VARENICLINE TARTRATE 1 MG: 0.5 TABLET, FILM COATED ORAL at 08:06

## 2024-06-04 RX ADMIN — ENOXAPARIN SODIUM 40 MG: 40 INJECTION SUBCUTANEOUS at 08:06

## 2024-06-04 RX ADMIN — IOHEXOL 100 ML: 350 INJECTION, SOLUTION INTRAVENOUS at 01:06

## 2024-06-04 RX ADMIN — CHOLESTYRAMINE LIGHT 4 G: 4 POWDER, FOR SUSPENSION ORAL at 10:06

## 2024-06-04 RX ADMIN — SODIUM CHLORIDE: 9 INJECTION, SOLUTION INTRAVENOUS at 08:06

## 2024-06-04 RX ADMIN — PREDNISONE 40 MG: 20 TABLET ORAL at 04:06

## 2024-06-04 RX ADMIN — HYDROMORPHONE HYDROCHLORIDE 0.5 MG: 0.5 INJECTION, SOLUTION INTRAMUSCULAR; INTRAVENOUS; SUBCUTANEOUS at 06:06

## 2024-06-04 NOTE — CARE UPDATE
Patient seen and examined.   Admitted with exacerbation of ulcerative colitis.  Reports symptoms the past week.  Having bloody bowel movements daily.  Diffuse abdominal pain.  We will follow GI recommendations  Start clear liquid diet for now.

## 2024-06-04 NOTE — PLAN OF CARE
[facility]  Initial Discharge Assessment    Assessment completed at bedside with Pt, and all information on FaceSheet confirmed, including demographics, PCP, pharmacy and insurance. Pt has not addressed advance directives has addressed advance directives and is POA. He currently lives in alone in [place]. His PCP is Hunter Agiular MD, and last appointment was last month. His preferred pharmacy is [facility] in [place]. He denies any DME/HH/HD/Blood Thinners. Family/friends will transport back home after discharge. He denies any recent hospitalizations. Plan for discharge is to return home. Case Management to continue to follow for discharge planning needs.        Primary Care Provider: Hunter Aguilar III, MD    Admission Diagnosis: Colitis [K52.9]  Exacerbation of ulcerative colitis [K51.90]    Admission Date: 6/3/2024  Expected Discharge Date:     Transition of Care Barriers: None    Payor: [employer] / Plan: [employer] ([employer]) / Product Type: Commercial /     Extended Emergency Contact Information  Primary Emergency Contact: Estiven (Sister-In-Law)Andrea  Address: [address] [place] [address], [place] [address] [facility]  Home Phone: [phone]  Mobile Phone: [phone]  Relation: Sister  Preferred language: English   needed? No  Secondary Emergency Contact: Dallas Jean-Baptiste   [place]  Mobile Phone: [phone]  Relation: Brother  Preferred language: English   needed? No    Discharge Plan A: Home  Discharge Plan B: Home      [id number] - [place], [place] - [address] AT [facility] [address]  [address]  [facility] [address]  Phone: [phone] Fax: [phone]    [facility] - [place], [place] - [address] [facility]  [address] [facility] [address]  Phone: [phone] Fax: [phone]    [facility]  [address]  [facility]  [address]  Phone: [phone] Fax: [phone]      Initial Assessment (most recent)       Adult Discharge Assessment - 06/04/24 1519          Discharge Assessment    Assessment Type Discharge Planning Assessment     Confirmed/corrected address, phone number and insurance Yes     Confirmed Demographics Correct on Facesheet     Source of Information patient     When was your last doctors appointment? --   last month    Does patient/caregiver understand observation status Yes     Reason For Admission Colitis     People in Home alone     Do you expect to return to your current living situation? Yes     Do you have help at home or someone to help you manage your care at home? No     Prior to hospitilization cognitive status: Alert/Oriented     Current cognitive status: Alert/Oriented     Walking or Climbing Stairs Difficulty no     Dressing/Bathing Difficulty no     Home Accessibility not wheelchair accessible     Equipment Currently Used at Home none     Readmission within 30 days? No     Patient currently being followed by outpatient case management? No     Do you currently have service(s) that help you manage your care at home? No     Do you take prescription medications? Yes     Do you have prescription coverage? Yes     Coverage [employer] - [place] [employer] ([employer]     Do you have any problems affording any of your prescribed medications? No     Is the patient taking medications as prescribed? yes     Who is going to help you get home at discharge? Fmaily/friends     Are you on dialysis? No     Do you take coumadin? No     Discharge Plan A Home     Discharge Plan B Home     DME Needed Upon Discharge  none     Discharge Plan discussed with: Patient     Transition of Care Barriers None

## 2024-06-04 NOTE — CONSULTS
GASTROENTEROLOGY INPATIENT CONSULT NOTE  Patient Name: Jacoby Jean-Baptiste  Patient MRN: [id number]  Patient : 1975    Admit Date: 6/3/2024  Service date: 2024    Reason for Consult: diarrhea    PCP: Hunter Aguilar III, MD    Chief Complaint   Patient presents with    Abdominal Pain     Started last night. Hx of Ulcerative Colitis    Nausea    Vomiting    Diarrhea       HPI: Patient is a 49 y.o. male with PMHx HLD, DM on ozempic, HTN, LOLIS, umbilical hernia repair, tobacco use, C. diff s/p Vanco / dificid / FMT / rebyota presents for evaluation of abd pain/diarrhea. Acute onset, intermittent, progressive since stopping remicade. Was having 10+ BM daily initially that drastically improved w/ Remicade . Got fecal xpolant in 3/'23. Remicade stopped due to cavitary fungal lesions likely from immunosuppression from remicade. Started on Entyvio in  but lack of response with severe, recurrent colitis and Omvoq started late  after insurance fight. Got 2nd dose of 3 loading doses of Omvoq 24. Still w/ abdominal cramps/nausea, frequent BM but bleeding does seem to be imrpoved.      CHART REVIEW:   Labs  - CRP 6.9; Calprotectin pending   CT Abd 6/'24 x2 - mild hepatomegaly; s/p karina; Nml bilary / panc; duod tic; L sided colitis  Hospitalization  - colitis on CT scan; Neg C. Diff; Calrpo 2850 (improved from >8k in 3/'24)  Hospitalization 3/'24 - Calpro >8K; flex w/ severe L sided UC; placed on steroids. C. diff neg;....d/c entyvio  Hospitalization ; CT  - fatty liver; L sided colitis; Calpro back up ot 3070 (201 on remicade); Neg C. diff  Entyvio started   Hospitalization  - cavitary fungal lesions  Labs 10/'23 - C.diff negative after vanco and rebyota enemas.  Labs  -  Calprotectin 201 on remicade w/ C. diff...tx w/ vanco w/ long taper +/- rebyota; Nml CRP much better on remicade  EGD  - candidate esophagitis / duodenitis; HP neg gastirits  Hospitalization   - colitis on CT; C. diff neg; Remicade loaded  Colon 3/'23 - Pan-colitis; Nml TI; s/p FMT for refractory C. diff  C. DIff + 3/'23  Labs 1/'23 - CRP 21; Stool PCR + C. DIff....  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     Past Medical History:  Past Medical History:   Diagnosis Date    C. difficile colitis     Cavitary pneumonia 11/2023    pos aspergillus serology    Diabetes mellitus 01/2022    Hyperlipidemia 2015    Hypertension 2015    Insomnia 2015    Ulcerative colitis         Past Surgical History:  Past Surgical History:   Procedure Laterality Date    BRONCHOSCOPY WITH FLUOROSCOPY Left 11/20/2023    Procedure: BRONCHOSCOPY, WITH FLUOROSCOPY;  Surgeon: Lisa Villarreal MD;  Location: [facility];  Service: Pulmonary;  Laterality: Left;    BRONCHOSCOPY WITH FLUOROSCOPY N/A 11/30/2023    Procedure: BRONCHOSCOPY, WITH FLUOROSCOPY;  Surgeon: Lisa Villarreal MD;  Location: [facility];  Service: Pulmonary;  Laterality: N/A;    CARDIAC ELECTROPHYSIOLOGY MAPPING AND ABLATION  2017    SVT    CHOLECYSTECTOMY  2011    COLONOSCOPY N/A 5/3/2022    Procedure: COLONOSCOPY;  Surgeon: Shan Jean III, MD;  Location: [facility];  Service: Endoscopy;  Laterality: N/A;    COLONOSCOPY N/A 3/16/2024    Procedure: COLONOSCOPY;  Surgeon: ALEKSANDER Ramos MD;  Location: [facility];  Service: Endoscopy;  Laterality: N/A;    COLONOSCOPY WITH FECAL MICROBIOTA TRANSFER N/A 3/21/2023    Procedure: COLONOSCOPY, WITH FECAL MICROBIOTA TRANSFER;  Surgeon: David Millan MD;  Location: [facility];  Service: Endoscopy;  Laterality: N/A;    ESOPHAGOGASTRODUODENOSCOPY N/A 4/24/2023    Procedure: EGD (ESOPHAGOGASTRODUODENOSCOPY);  Surgeon: Shan Jean III, MD;  Location: [facility];  Service: Endoscopy;  Laterality: N/A;    GANGLION CYST EXCISION Left 1992    UMBILICAL HERNIA REPAIR  2011    with mesh        Home  Medications:  Medications Prior to Admission   Medication Sig Dispense Refill Last Dose    busPIRone (BUSPAR) 7.5 MG tablet Take 1 tablet (7.5 mg total) by mouth 3 (three) times daily. 270 tablet 1 6/3/2024    cholestyramine (QUESTRAN) 4 gram packet Take 1 packet by mouth 3 (three) times daily.   6/3/2024    ergocalciferol (VITAMIN D2) 50,000 unit Cap Take 1 capsule (50,000 Units total) by mouth every 7 days. 12 capsule 1 Past Week    HYDROcodone-acetaminophen (NORCO) 5-325 mg per tablet Take 1 tablet by mouth every 6 (six) hours as needed for Pain. 6 tablet 0 6/3/2024    hyoscyamine (LEVBID) 0.375 mg Tb12 Take 0.375 mg by mouth 2 (two) times daily.   6/3/2024    LORazepam (ATIVAN) 0.5 MG tablet Take 1 tablet (0.5 mg total) by mouth every 12 (twelve) hours as needed for Anxiety. 30 tablet 0 6/3/2024    metFORMIN (GLUCOPHAGE-XR) 500 MG ER 24hr tablet Take 1 tablet (500 mg total) by mouth 2 (two) times daily with meals. 180 tablet 1 6/3/2024    metoprolol succinate (TOPROL-XL) 50 MG 24 hr tablet Take 1 tablet (50 mg total) by mouth once daily. 90 tablet 1 6/3/2024    pantoprazole (PROTONIX) 40 MG tablet Take 1 tablet (40 mg total) by mouth 2 (two) times daily. 180 tablet 3 6/3/2024    promethazine (PHENERGAN) 12.5 MG Tab Take 12.5 mg by mouth 4 (four) times daily as needed.       promethazine (PHENERGAN) 25 MG tablet Take 1 tablet (25 mg total) by mouth 4 (four) times daily as needed for Nausea. 25 tablet 0 6/3/2024    sertraline (ZOLOFT) 100 MG tablet Take 1 tablet (100 mg total) by mouth once daily. 90 tablet 1 6/3/2024    simvastatin (ZOCOR) 20 MG tablet Take 20 mg by mouth every evening.   6/3/2024    zolpidem (AMBIEN) 10 mg Tab Take 1 tablet (10 mg total) by mouth nightly as needed (insomnia). 30 tablet 2 6/3/2024    dicyclomine (BENTYL) 10 MG capsule Take 1 capsule (10 mg total) by mouth 3 (three) times daily as needed. 90 capsule 0     lipase-protease-amylase 12,000-38,000-60,000 units (CREON) CpDR Take 3  capsules by mouth 3 (three) times daily with meals. (Patient not taking: Reported on 2024) 270 capsule 11     [] methylPREDNISolone (MEDROL) 8 MG tablet Take 7 tablets (56 mg total) by mouth once daily for 4 days, THEN 6 tablets (48 mg total) once daily for 4 days, THEN 5 tablets (40 mg total) once daily for 4 days, THEN 4 tablets (32 mg total) once daily for 3 days, THEN 3 tablets (24 mg total) once daily for 3 days, THEN 2 tablets (16 mg total) once daily for 3 days, THEN 1 tablet (8 mg total) once daily for 4 days. 103 tablet 0     semaglutide (OZEMPIC) 1 mg/dose (4 mg/3 mL) Inject 1 mg into the skin every 7 days.  3 each 2024    varenicline (CHANTIX) 1 mg Tab Take 1 tablet (1 mg total) by mouth 2 (two) times daily. 180 tablet 1 Unknown       Inpatient Medications:   busPIRone  7.5 mg Oral TID    cholestyramine-aspartame  1 packet Oral BID    enoxparin  40 mg Subcutaneous Q12H (prophylaxis, 0900/2100)    metoprolol succinate  50 mg Oral Daily    pantoprazole  40 mg Oral BID    sertraline  100 mg Oral Daily    varenicline  1 mg Oral BID       Current Facility-Administered Medications:     acetaminophen, 650 mg, Oral, Q8H PRN    acetaminophen, 650 mg, Oral, Q4H PRN    dextrose 50%, 12.5 g, Intravenous, PRN    dextrose 50%, 25 g, Intravenous, PRN    dicyclomine, 10 mg, Oral, TID PRN    glucagon (human recombinant), 1 mg, Intramuscular, PRN    glucose, 16 g, Oral, PRN    glucose, 24 g, Oral, PRN    HYDROcodone-acetaminophen, 1 tablet, Oral, Q6H PRN    HYDROmorphone, 0.5 mg, Intravenous, Q6H PRN    LORazepam, 0.5 mg, Oral, Q12H PRN    magnesium oxide, 800 mg, Oral, PRN    magnesium oxide, 800 mg, Oral, PRN    naloxone, 0.02 mg, Intravenous, PRN    potassium bicarbonate, 35 mEq, Oral, PRN    potassium bicarbonate, 50 mEq, Oral, PRN    potassium bicarbonate, 60 mEq, Oral, PRN    potassium, sodium phosphates, 2 packet, Oral, PRN    potassium, sodium phosphates, 2 packet, Oral, PRN    potassium,  "sodium phosphates, 2 packet, Oral, PRN    promethazine, 25 mg, Oral, QID PRN    sodium chloride 0.9%, 2 mL, Intravenous, Q12H PRN    zolpidem, 10 mg, Oral, Nightly PRN    Review of patient's allergies indicates:  No Known Allergies    Social History:   Social History     Occupational History    Not on file   Tobacco Use    Smoking status: Former     Current packs/day: 1.00     Average packs/day: 1 pack/day for 31.4 years (31.4 ttl pk-yrs)     Types: Cigarettes     Start date: 1993    Smokeless tobacco: Never    Tobacco comments:     Pt states he has stopped smoking with Chantix three weeks ago. 12/1/23   Substance and Sexual Activity    Alcohol use: Yes     Comment: ocass    Drug use: Not Currently    Sexual activity: Not Currently       Family History:   Family History   Problem Relation Name Age of Onset    Asthma Mother         Review of Systems:  A 10 point review of systems was performed and was normal, except as mentioned in the HPI, including constitutional, HEENT, heme, lymph, cardiovascular, respiratory, gastrointestinal, genitourinary, neurologic, endocrine, psychiatric and musculoskeletal.      OBJECTIVE:    Physical Exam:  24 Hour Vital Sign Ranges: Temp:  [97.5 °F (36.4 °C)-98.4 °F (36.9 °C)] 98.4 °F (36.9 °C)  Pulse:  [78-94] 78  Resp:  [16-18] 17  SpO2:  [97 %-100 %] 97 %  BP: (111-140)/(73-89) 111/75  Most recent vitals: /75   Pulse 78   Temp 98.4 °F (36.9 °C)   Resp 17   Ht 5' 5" (1.651 m)   Wt 119.7 kg (264 lb)   SpO2 97%   BMI 43.93 kg/m²    GEN: well-developed, well-nourished, awake and alert, non-toxic appearing adult  HEENT: PERRL, sclera anicteric, oral mucosa pink and moist without lesion  NECK: trachea midline; Good ROM  CV: regular rate and rhythm, no murmurs or gallops  RESP: clear to auscultation bilaterally, no wheezes, rhonci or rales  ABD: soft, non-tender, non-distended, normal bowel sounds  EXT: no swelling or edema, 2+ pulses distally  SKIN: no rashes or " "jaundice  PSYCH: normal affect    Labs:   Recent Labs     06/01/24  1516 06/03/24  2205   WBC 15.97* 8.72   MCV 99* 100*    283     Recent Labs     06/01/24  1516 06/03/24  2205   * 135*   K 4.6 3.7    104   CO2 19* 20*   BUN 15 9    95     No results for input(s): "ALB" in the last 72 hours.    Invalid input(s): "ALKP", "SGOT", "SGPT", "TBIL", "DBIL", "TPRO"  No results for input(s): "PT", "INR", "PTT" in the last 72 hours.      Radiology Review:  CT Abdomen Pelvis With IV Contrast NO Oral Contrast   Final Result      Nonspecific proctocolitis involving the rectum and left colon, as above, similar compared to prior.      Bilateral nonobstructing renal calculi, similar compared to prior.         Electronically signed by: Russ Medina MD   Date:    06/04/2024   Time:    02:28            IMPRESSION / RECOMMENDATIONS:  49 y.o. male with PMHx HLD, DM on ozempic, HTN, LOLIS, umbilical hernia repair, tobacco use, C. diff s/p Vanco / dificid / FMT / rebyota presents for evaluation of abd pain/diarrhea w/ known ulcerative colitis. Bleeding / CRP appear better but still having signifcant clinical symptoms. RIsks, benefits, alternatives discussed in detail regarding upcoming procedures and sedation and possible complications. Some of the more common endoscopic complications include but not limited to immediate or delayed perforation, bleeding, infections, pain, inadvertent injury to surrounding tissue / organs and possible need for surgical evaluation. All questions answered and will proceed with procedure as planned.     -Recheck calpro to monitor trend  -Flex to re-assess UC and biopsy to r/o CMV tomorrow.   -Ideally continue Omvoh if some endoscopic improvement  -May need prolonged budesonide course to avoid long term steroids to all for omvoh to take full effect.   -If fails omvoh, options would be to restart remicade knowing infectious risks vs considering sterlara which has less " immunosuppresion.     Thank you for this consult.    Shan Donaldive III  6/4/2024  2:45 PM

## 2024-06-04 NOTE — ED PROVIDER NOTES
Encounter Date: 6/3/2024       History     Chief Complaint   Patient presents with    Abdominal Pain     Started last night. Hx of Ulcerative Colitis    Nausea    Vomiting    Diarrhea     HPI  49-year-old presenting with persistent nausea vomiting diarrhea and abdominal pain.  He was seen yesterday and diagnosed with ulcerative colitis flare and discharged with 2 days of Norco.  He returns today reporting that his nausea and vomiting are not controlled and that his abdominal pain is not controlled either.  He reports that he continues to have diarrhea, 4-5 times, nonbloody.  But with mucus.  Yesterday his pain was controlled in the ER and he was sent home.  He was had 3 admissions for colitis flares since February and GI consulted during admission recommended against antibiotics and recommended Imodium and Bentyl and steroids.  Unclear what patient was treated with for pain other than the above medications.  It seems he may have been controlled with these medications alone.  Patient reports to me that usually he needs Dilaudid and Phenergan  Review of patient's allergies indicates:  No Known Allergies  Past Medical History:   Diagnosis Date    C. difficile colitis     Cavitary pneumonia 11/2023    pos aspergillus serology    Diabetes mellitus 01/2022    Hyperlipidemia 2015    Hypertension 2015    Insomnia 2015    Ulcerative colitis      Past Surgical History:   Procedure Laterality Date    BRONCHOSCOPY WITH FLUOROSCOPY Left 11/20/2023    Procedure: BRONCHOSCOPY, WITH FLUOROSCOPY;  Surgeon: Lisa Villarreal MD;  Location: [facility];  Service: Pulmonary;  Laterality: Left;    BRONCHOSCOPY WITH FLUOROSCOPY N/A 11/30/2023    Procedure: BRONCHOSCOPY, WITH FLUOROSCOPY;  Surgeon: Lisa Villarreal MD;  Location: [facility] END[facility];  Service: Pulmonary;  Laterality: N/A;    CARDIAC ELECTROPHYSIOLOGY MAPPING AND ABLATION  2017    SVT    CHOLECYSTECTOMY  2011    COLONOSCOPY N/A 5/3/2022    Procedure: COLONOSCOPY;  Surgeon: Shan  NINO Jean III, MD;  Location: [facility];  Service: Endoscopy;  Laterality: N/A;    COLONOSCOPY N/A 3/16/2024    Procedure: COLONOSCOPY;  Surgeon: ALEKSANDER Ramos MD;  Location: [facility] ENDO;  Service: Endoscopy;  Laterality: N/A;    COLONOSCOPY WITH FECAL MICROBIOTA TRANSFER N/A 3/21/2023    Procedure: COLONOSCOPY, WITH FECAL MICROBIOTA TRANSFER;  Surgeon: David Millan MD;  Location: [facility] ENDO;  Service: Endoscopy;  Laterality: N/A;    ESOPHAGOGASTRODUODENOSCOPY N/A 4/24/2023    Procedure: EGD (ESOPHAGOGASTRODUODENOSCOPY);  Surgeon: Shan Jean III, MD;  Location: [facility];  Service: Endoscopy;  Laterality: N/A;    GANGLION CYST EXCISION Left 1992    UMBILICAL HERNIA REPAIR  2011    with mesh     Family History   Problem Relation Name Age of Onset    Asthma Mother       Social History     Tobacco Use    Smoking status: Former     Current packs/day: 1.00     Average packs/day: 1 pack/day for 31.4 years (31.4 ttl pk-yrs)     Types: Cigarettes     Start date: 1993    Smokeless tobacco: Never    Tobacco comments:     Pt states he has stopped smoking with Chantix three weeks ago. 12/1/23   Substance Use Topics    Alcohol use: Yes     Comment: ocass    Drug use: Not Currently     Review of Systems   Constitutional:  Negative for chills and fever.   HENT:  Negative for congestion and sore throat.    Respiratory:  Negative for cough and shortness of breath.    Cardiovascular:  Negative for chest pain and leg swelling.   Gastrointestinal:  Positive for abdominal pain, diarrhea, nausea and vomiting. Negative for blood in stool and constipation.   Genitourinary:  Negative for dysuria and frequency.   Skin:  Negative for rash.       Physical Exam     Initial Vitals [06/03/24 2140]   BP Pulse Resp Temp SpO2   (!) 140/89 94 16 97.5 °F (36.4 °C) 99 %      MAP       --         Physical Exam    Nursing note and vitals reviewed.  Constitutional: He appears well-developed and well-nourished. He is not diaphoretic.    Answering questions in full sentences without increased work of breathing. Sitting on edge of bed holding emesis bag, holding abdomen   HENT:   Head: Normocephalic.   Eyes: Right eye exhibits no discharge. Left eye exhibits no discharge. No scleral icterus.   Neck: Neck supple. No tracheal deviation present.   Normal range of motion.  Cardiovascular:  Normal rate and regular rhythm.           Pulmonary/Chest: No stridor. No respiratory distress. He has no wheezes. He has no rhonchi. He has no rales. He exhibits no tenderness.   Abdominal: Abdomen is soft. There is abdominal tenderness (diffuse, mild). There is no rebound and no guarding.   Musculoskeletal:         General: No edema.      Cervical back: Normal range of motion and neck supple.     Neurological: He is alert and oriented to person, place, and time.   Moving all extremities   Skin: Skin is warm and dry.         ED Course   Procedures  Labs Reviewed   CBC W/ AUTO DIFFERENTIAL - Abnormal; Notable for the following components:       Result Value    RBC 4.11 (*)     Hemoglobin 13.0 (*)      (*)     MCH 31.6 (*)     MCHC 31.8 (*)     RDW 15.9 (*)     Immature Granulocytes 0.6 (*)     Immature Grans (Abs) 0.05 (*)     Lymph % 16.5 (*)     All other components within normal limits   COMPREHENSIVE METABOLIC PANEL - Abnormal; Notable for the following components:    Sodium 135 (*)     CO2 20 (*)     AST 7 (*)     All other components within normal limits   LIPASE - Abnormal; Notable for the following components:    Lipase <3 (*)     All other components within normal limits   SEDIMENTATION RATE - Abnormal; Notable for the following components:    Sed Rate 46 (*)     All other components within normal limits   C-REACTIVE PROTEIN - Abnormal; Notable for the following components:    CRP 6.90 (*)     All other components within normal limits   URINALYSIS, REFLEX TO URINE CULTURE          Imaging Results              CT Abdomen Pelvis With IV Contrast NO Oral  Contrast (Final result)  Result time 06/04/24 02:28:26      Final result by Russ Medina MD (06/04/24 02:28:26)                   Impression:      Nonspecific proctocolitis involving the rectum and left colon, as above, similar compared to prior.    Bilateral nonobstructing renal calculi, similar compared to prior.      Electronically signed by: Russ Medina MD  Date:    06/04/2024  Time:    02:28               Narrative:    EXAMINATION:  CT ABDOMEN PELVIS WITH IV CONTRAST    CLINICAL HISTORY:  LLQ abdominal pain;Nausea/vomiting;    TECHNIQUE:  Low dose axial images, sagittal and coronal reformations were obtained from the lung bases to the pubic symphysis following the IV administration of 100 mL of Omnipaque 350 .  Oral contrast was not administered.    COMPARISON:  06/01/2024.    FINDINGS:  Abdomen:    - Lower thorax:Unremarkable.    - Lung bases: No infiltrates and no nodules.    - Liver: Mild hepatomegaly, similar to prior.  No focal mass.    - Gallbladder: Surgically absent.    - Bile Ducts: No evidence of intra or extra hepatic biliary ductal dilation.    - Spleen: Negative.    - Kidneys: Bilateral nonobstructing renal calculi, similar to prior.  No hydronephrosis.    - Adrenals: Unremarkable.    - Pancreas: No mass or peripancreatic fat stranding.    - Retroperitoneum:  No significant adenopathy.    - Vascular: No abdominal aortic aneurysm.    - Abdominal wall:  Unremarkable.    Pelvis:    No pelvic mass, adenopathy, or free fluid.  1.4 cm calcification in the anterior pelvis, unchanged from prior    Bowel/Mesentery:    No bowel obstruction.  Duodenal diverticulum, without significant change from prior.  Diffuse wall thickening and pericolic fat stranding involving the rectum and the left colon from the sigmoid colon to the transverse colon, similar compared to prior, consistent with nonspecific proctocolitis without significant interval change from prior study.    Bones:  No acute osseous  abnormality and no suspicious lytic or blastic lesion.                                       Medications   HYDROmorphone injection 0.5 mg (has no administration in time range)   sodium chloride 0.9% bolus 1,000 mL 1,000 mL (0 mLs Intravenous Stopped 6/4/24 0138)   oxyCODONE immediate release tablet 5 mg (5 mg Oral Given 6/3/24 2359)   ondansetron injection 4 mg (4 mg Intravenous Given 6/3/24 2359)   iohexoL (OMNIPAQUE 350) injection 100 mL (100 mLs Intravenous Given 6/4/24 0131)   droPERidol injection 1.25 mg (1.25 mg Intravenous Given 6/4/24 0434)   predniSONE tablet 40 mg (40 mg Oral Given 6/4/24 0434)     Medical Decision Making  Amount and/or Complexity of Data Reviewed  Radiology: ordered.    Risk  Prescription drug management.  Decision regarding hospitalization.    On my independent interpretation labs CBC, CMP, lipase, CRP, ESR, urinalysis, within acceptable limits except for urinalysis pending    CT showing Nonspecific proctocolitis involving the rectum and left colon, as above, similar compared to prior. Bilateral nonobstructing renal calculi, similar compared to prior.    Patient reporting pain persistent despite oxycodone orally and droperidol IV.  He reports his nausea resolved after droperidol but pain persists.  He reports that he does not think he can go home with this pain although most of the time he does seem to be resting comfortably but intermittently he was sitting at the side of the bed grabbing at his stomach.  He does have ulcerative colitis and signs of active colitis on his CT scans.  I am concerned as I review his  and multiple opioid meds from multiple providers that patient may be becoming tolerant to opioids and going forward may need a pain doctor or 1 doctor such as his GI doctor her primary care doctor to be sole provider to prescribe his pain meds if opioids are to be used for his ulcerative colitis.  It was not seem GI while he has been in the hospital recommends opioids for  his pain control.  As I have been unable to get his symptoms under control and as he has active disease on his CT scan then have consulted Hospital Medicine for admission.  Ophelia Stout MD  Emergency Medicine Staff Physician                                      Clinical Impression:  Final diagnoses:  [K52.9] Colitis (Primary)          ED Disposition Condition    Admit Stable                Ophelia Stout MD  06/04/24 0521

## 2024-06-04 NOTE — H&P
[facility] - Emergency Dept  Hospital Medicine  History & Physical    Patient Name: Jacoby Jean-Baptiste  MRN: [id number]  Patient Class: OP- Observation  Admission Date: 6/3/2024  Attending Physician: Ruba Epps DO  Primary Care Provider: Hunter Aguilar III, MD         Patient information was obtained from patient and ER records.     Subjective:     Principal Problem:<principal problem not specified>    Chief Complaint:   Chief Complaint   Patient presents with    Abdominal Pain     Started last night. Hx of Ulcerative Colitis    Nausea    Vomiting    Diarrhea        HPI: This is a case of 49-year-old male with past medical history of insomnia, anxiety, depression known ulcerative colitis, hypertension.  Patient presented to the emergency department's with a complaint of constant pain of the abdomen with exacerbation of his colitis.  He said he gets medication once a month.  He did not have his medications list with him.  He has a gastroenterologist as an outpatient that he saw last month however he feels the pain is uncontrolled in his abdomen at this point.  Nothing made it better nothing made it worse.  He denied any nausea or vomiting.    In the emergency department's patient has white blood count of 8, he will hemoglobin 13, platelets 283, sed rate 46, CRP 6.9    Past Medical History:   Diagnosis Date    C. difficile colitis     Cavitary pneumonia 11/2023    pos aspergillus serology    Diabetes mellitus 01/2022    Hyperlipidemia 2015    Hypertension 2015    Insomnia 2015    Ulcerative colitis        Past Surgical History:   Procedure Laterality Date    BRONCHOSCOPY WITH FLUOROSCOPY Left 11/20/2023    Procedure: BRONCHOSCOPY, WITH FLUOROSCOPY;  Surgeon: Lisa Villarreal MD;  Location: [facility];  Service: Pulmonary;  Laterality: Left;    BRONCHOSCOPY WITH FLUOROSCOPY N/A 11/30/2023    Procedure: BRONCHOSCOPY, WITH FLUOROSCOPY;  Surgeon: Lisa Villarreal MD;  Location: [facility];  Service:  Pulmonary;  Laterality: N/A;    CARDIAC ELECTROPHYSIOLOGY MAPPING AND ABLATION  2017    SVT    CHOLECYSTECTOMY  2011    COLONOSCOPY N/A 5/3/2022    Procedure: COLONOSCOPY;  Surgeon: Shan Jean III, MD;  Location: [facility] ENDO;  Service: Endoscopy;  Laterality: N/A;    COLONOSCOPY N/A 3/16/2024    Procedure: COLONOSCOPY;  Surgeon: ALEKSANDER Ramos MD;  Location: [facility] ENDO;  Service: Endoscopy;  Laterality: N/A;    COLONOSCOPY WITH FECAL MICROBIOTA TRANSFER N/A 3/21/2023    Procedure: COLONOSCOPY, WITH FECAL MICROBIOTA TRANSFER;  Surgeon: David Millan MD;  Location: [facility] ENDO;  Service: Endoscopy;  Laterality: N/A;    ESOPHAGOGASTRODUODENOSCOPY N/A 4/24/2023    Procedure: EGD (ESOPHAGOGASTRODUODENOSCOPY);  Surgeon: Shan Jean III, MD;  Location: [facility] ENDO;  Service: Endoscopy;  Laterality: N/A;    GANGLION CYST EXCISION Left 1992    UMBILICAL HERNIA REPAIR  2011    with mesh       Review of patient's allergies indicates:  No Known Allergies    Current Facility-Administered Medications on File Prior to Encounter   Medication    lactated ringers infusion     Current Outpatient Medications on File Prior to Encounter   Medication Sig    busPIRone (BUSPAR) 7.5 MG tablet Take 1 tablet (7.5 mg total) by mouth 3 (three) times daily.    cholestyramine (QUESTRAN) 4 gram packet Take 1 packet by mouth 3 (three) times daily.    dicyclomine (BENTYL) 10 MG capsule Take 1 capsule (10 mg total) by mouth 3 (three) times daily as needed.    ergocalciferol (VITAMIN D2) 50,000 unit Cap Take 1 capsule (50,000 Units total) by mouth every 7 days.    HYDROcodone-acetaminophen (NORCO) 5-325 mg per tablet Take 1 tablet by mouth every 6 (six) hours as needed for Pain.    lipase-protease-amylase 12,000-38,000-60,000 units (CREON) CpDR Take 3 capsules by mouth 3 (three) times daily with meals. (Patient not taking: Reported on 5/17/2024)    LORazepam (ATIVAN) 0.5 MG tablet Take 1 tablet (0.5 mg total) by mouth every 12  (twelve) hours as needed for Anxiety.    metFORMIN (GLUCOPHAGE-XR) 500 MG ER 24hr tablet Take 1 tablet (500 mg total) by mouth 2 (two) times daily with meals.    [] methylPREDNISolone (MEDROL) 8 MG tablet Take 7 tablets (56 mg total) by mouth once daily for 4 days, THEN 6 tablets (48 mg total) once daily for 4 days, THEN 5 tablets (40 mg total) once daily for 4 days, THEN 4 tablets (32 mg total) once daily for 3 days, THEN 3 tablets (24 mg total) once daily for 3 days, THEN 2 tablets (16 mg total) once daily for 3 days, THEN 1 tablet (8 mg total) once daily for 4 days.    metoprolol succinate (TOPROL-XL) 50 MG 24 hr tablet Take 1 tablet (50 mg total) by mouth once daily.    pantoprazole (PROTONIX) 40 MG tablet Take 1 tablet (40 mg total) by mouth 2 (two) times daily.    promethazine (PHENERGAN) 25 MG tablet Take 1 tablet (25 mg total) by mouth 4 (four) times daily as needed for Nausea.    semaglutide (OZEMPIC) 1 mg/dose (4 mg/3 mL) Inject 1 mg into the skin every 7 days.     sertraline (ZOLOFT) 100 MG tablet Take 1 tablet (100 mg total) by mouth once daily.    simvastatin (ZOCOR) 20 MG tablet Take 20 mg by mouth every evening.    varenicline (CHANTIX) 1 mg Tab Take 1 tablet (1 mg total) by mouth 2 (two) times daily.    zolpidem (AMBIEN) 10 mg Tab Take 1 tablet (10 mg total) by mouth nightly as needed (insomnia).     Family History       Problem Relation (Age of Onset)    Asthma Mother          Tobacco Use    Smoking status: Former     Current packs/day: 1.00     Average packs/day: 1 pack/day for 31.4 years (31.4 ttl pk-yrs)     Types: Cigarettes     Start date:     Smokeless tobacco: Never    Tobacco comments:     Pt states he has stopped smoking with Chantix three weeks ago. 23   Substance and Sexual Activity    Alcohol use: Yes     Comment: ocass    Drug use: Not Currently    Sexual activity: Not Currently     Review of Systems   Constitutional:  Negative for activity change and appetite  change.   HENT:  Negative for congestion and dental problem.    Eyes:  Negative for discharge and itching.   Respiratory:  Negative for apnea and choking.    Cardiovascular:  Negative for chest pain and leg swelling.   Gastrointestinal:  Positive for abdominal pain and diarrhea. Negative for abdominal distention, anal bleeding and blood in stool.   Endocrine: Negative for cold intolerance and heat intolerance.   Genitourinary:  Negative for difficulty urinating, dysuria and enuresis.   Musculoskeletal:  Negative for arthralgias and back pain.   Allergic/Immunologic: Negative for environmental allergies and food allergies.   Neurological:  Negative for dizziness, facial asymmetry and headaches.     Objective:     Vital Signs (Most Recent):  Temp: 97.5 °F (36.4 °C) (06/03/24 2140)  Pulse: 82 (06/04/24 0430)  Resp: 17 (06/04/24 0624)  BP: 138/79 (06/04/24 0430)  SpO2: 97 % (06/04/24 0430) Vital Signs (24h Range):  Temp:  [97.5 °F (36.4 °C)] 97.5 °F (36.4 °C)  Pulse:  [82-94] 82  Resp:  [16-18] 17  SpO2:  [97 %-100 %] 97 %  BP: (128-140)/(73-89) 138/79     Weight: 122.5 kg (270 lb)  Body mass index is 44.93 kg/m².     Physical Exam  Constitutional:       General: He is not in acute distress.     Appearance: Normal appearance. He is not ill-appearing.   HENT:      Head: Normocephalic and atraumatic.      Right Ear: There is no impacted cerumen.      Left Ear: There is no impacted cerumen.      Nose: No congestion or rhinorrhea.      Mouth/Throat:      Pharynx: No oropharyngeal exudate or posterior oropharyngeal erythema.   Eyes:      General:         Right eye: No discharge.         Left eye: No discharge.   Cardiovascular:      Rate and Rhythm: Normal rate and regular rhythm.      Heart sounds: No murmur heard.     No friction rub.   Pulmonary:      Effort: No respiratory distress.      Breath sounds: Normal breath sounds. No stridor.   Abdominal:      General: There is no distension.      Palpations: There is no mass.  "     Tenderness: There is abdominal tenderness.   Musculoskeletal:         General: No swelling or tenderness.      Cervical back: No rigidity or tenderness.   Skin:     Coloration: Skin is not jaundiced or pale.                Significant Labs: All pertinent labs within the past 24 hours have been reviewed.  Blood Culture: No results for input(s): "LABBLOO" in the last 48 hours.  BMP:   Recent Labs   Lab 06/03/24  2205   GLU 95   *   K 3.7      CO2 20*   BUN 9   CREATININE 1.2   CALCIUM 9.3     CBC:   Recent Labs   Lab 06/03/24  2205   WBC 8.72   HGB 13.0*   HCT 40.9        CMP:   Recent Labs   Lab 06/03/24  2205   *   K 3.7      CO2 20*   GLU 95   BUN 9   CREATININE 1.2   CALCIUM 9.3   PROT 7.0   ALBUMIN 3.6   BILITOT 0.3   ALKPHOS 60   AST 7*   ALT 13   ANIONGAP 11     Recent Labs   Lab 06/03/24  2205   LIPASE <3*       TSH:   Recent Labs   Lab 04/10/24  0750   TSH 1.101     Imaging Results              CT Abdomen Pelvis With IV Contrast NO Oral Contrast (Final result)  Result time 06/04/24 02:28:26      Final result by Russ Medina MD (06/04/24 02:28:26)                   Impression:      Nonspecific proctocolitis involving the rectum and left colon, as above, similar compared to prior.    Bilateral nonobstructing renal calculi, similar compared to prior.      Electronically signed by: Russ Medina MD  Date:    06/04/2024  Time:    02:28               Narrative:    EXAMINATION:  CT ABDOMEN PELVIS WITH IV CONTRAST    CLINICAL HISTORY:  LLQ abdominal pain;Nausea/vomiting;    TECHNIQUE:  Low dose axial images, sagittal and coronal reformations were obtained from the lung bases to the pubic symphysis following the IV administration of 100 mL of Omnipaque 350 .  Oral contrast was not administered.    COMPARISON:  06/01/2024.    FINDINGS:  Abdomen:    - Lower thorax:Unremarkable.    - Lung bases: No infiltrates and no nodules.    - Liver: Mild hepatomegaly, similar to prior.  " No focal mass.    - Gallbladder: Surgically absent.    - Bile Ducts: No evidence of intra or extra hepatic biliary ductal dilation.    - Spleen: Negative.    - Kidneys: Bilateral nonobstructing renal calculi, similar to prior.  No hydronephrosis.    - Adrenals: Unremarkable.    - Pancreas: No mass or peripancreatic fat stranding.    - Retroperitoneum:  No significant adenopathy.    - Vascular: No abdominal aortic aneurysm.    - Abdominal wall:  Unremarkable.    Pelvis:    No pelvic mass, adenopathy, or free fluid.  1.4 cm calcification in the anterior pelvis, unchanged from prior    Bowel/Mesentery:    No bowel obstruction.  Duodenal diverticulum, without significant change from prior.  Diffuse wall thickening and pericolic fat stranding involving the rectum and the left colon from the sigmoid colon to the transverse colon, similar compared to prior, consistent with nonspecific proctocolitis without significant interval change from prior study.    Bones:  No acute osseous abnormality and no suspicious lytic or blastic lesion.                                     Significant Imaging: I have reviewed all pertinent imaging results/findings within the past 24 hours.      Assessment/Plan:     Depression  Chronic, continue home medication of Cymbalta        Ulcerative colitis  It appears to be acute exacerbation.  Patient has had multiple visits to the ER in the last 4 weeks.  Gastroenterology service consulted.  He was given 1 dose of steroids in the emergency department's.  We will defer the rest of management to Gastroenterology Service.  His CT scan is showing proctocolitis.  Patient has no leukocytosis.  No fever.  We will hold off antibiotics and follow gastroenterology recommendations.    Pain control.  Patient's pain medication choice is Dilaudid      Hyperlipemia  Hold statin for now given the presentation of uncontrolled ulcerative colitis      History of supraventricular tachycardia  Patient is able to on  metoprolol.  He is in sinus rhythm and regular rate.      Insomnia    Continue home medication of Ambien      VTE Risk Mitigation (From admission, onward)           Ordered     enoxaparin injection 40 mg  Every 12 hours         06/04/24 0628     IP VTE HIGH RISK PATIENT  Once         06/04/24 0623     Place sequential compression device  Until discontinued         06/04/24 0623                       On 06/04/2024, patient should be placed in hospital observation services under my care.        Pharmacist Renal Dose Adjustment Note    Jacoby Jean-Baptiste is a 49 y.o. male being treated with the medication Lovenox 40 mg daily.    Patient Data:    Vital Signs (Most Recent):  Temp: 97.5 °F (36.4 °C) (06/03/24 2140)  Pulse: 82 (06/04/24 0430)  Resp: 17 (06/04/24 0624)  BP: 138/79 (06/04/24 0430)  SpO2: 97 % (06/04/24 0430) Vital Signs (72h Range):  Temp:  [97.5 °F (36.4 °C)]   Pulse:  [82-94]   Resp:  [16-18]   BP: (128-140)/(73-89)   SpO2:  [97 %-100 %]      Recent Labs   Lab 06/01/24  1516 06/03/24  2205   CREATININE 1.3 1.2     Serum creatinine: 1.2 mg/dL 06/03/24 2205  Estimated creatinine clearance: 89.5 mL/min    Lovenox 40 mg daily will be changed to Lovenox 40 mg twice daily for DVT Ppx per dosing protocol for BMI > 40.    Pharmacist's Name: Kota Yan  Pharmacist's Extension: [phone]      Ruba Epps DO  Department of Hospital Medicine  [facility] - Emergency Dept

## 2024-06-04 NOTE — HOSPITAL COURSE
Patient was admitted to the hospital medicine service for acute exacerbation of ulcerative colitis.  GI was consulted.  Patient had endoscopy and colonoscopy done.  Non-bleeding duodenal diverticulum noted during EGD and ulcerative colitis noted on the colonoscopy which was unchanged compared to previous examinations. Biopsies taken of colon, including tissue culture of CMV and HSV. Patient was started on steroid and monitored.  Bowel movement frequency began to decrease, though he still felt rectal urgency.  Eventually he was discharged and instructed to take Medrol 32 mg per day and to follow up with  his gastroenterologist as soon as possible.    Physical Exam  Cardiovascular:      Rate and Rhythm: Normal rate and regular rhythm.   Pulmonary:      Effort: Pulmonary effort is normal.      Breath sounds: Normal breath sounds.   Abdominal:      General: There is no distension.      Tenderness: There is abdominal tenderness.

## 2024-06-04 NOTE — ASSESSMENT & PLAN NOTE
It appears to be acute exacerbation.  Patient has had multiple visits to the ER in the last 4 weeks.  Gastroenterology service consulted.  He was given 1 dose of steroids in the emergency department's.  We will defer the rest of management to Gastroenterology Service.  His CT scan is showing proctocolitis.  Patient has no leukocytosis.  No fever.  We will hold off antibiotics and follow gastroenterology recommendations.    Pain control.  Patient's pain medication choice is Dilaudid

## 2024-06-04 NOTE — NURSING
Patient arrived to floor admission assessment complete, VSS, Patient oriented to room, floor, call light. Patient in bed, bed alarm activated, call light in reach.   Nurses Note -- 4 Eyes      6/4/2024   1:52 PM      Skin assessed during: Admit      [] No Altered Skin Integrity Present    []Prevention Measures Documented      [x] Yes- Altered Skin Integrity Present or Discovered   [] LDA Added if Not in Epic (Describe Wound)   [] New Altered Skin Integrity was Present on Admit and Documented in LDA   [x] Wound Image Taken    Wound Care Consulted? Yes    Attending Nurse:  Carmen Wang RN/Staff Member:   Dana Mcarthur

## 2024-06-04 NOTE — HPI
This is a case of 49-year-old male with past medical history of insomnia, anxiety, depression known ulcerative colitis, hypertension.  Patient presented to the emergency department's with a complaint of constant pain of the abdomen with exacerbation of his colitis.  He said he gets medication once a month.  He did not have his medications list with him.  He has a gastroenterologist as an outpatient that he saw last month however he feels the pain is uncontrolled in his abdomen at this point.  Nothing made it better nothing made it worse.  He denied any nausea or vomiting.    In the emergency department's patient has white blood count of 8, he will hemoglobin 13, platelets 283, sed rate 46, CRP 6.9

## 2024-06-04 NOTE — SUBJECTIVE & OBJECTIVE
Past Medical History:   Diagnosis Date    C. difficile colitis     Cavitary pneumonia 11/2023    pos aspergillus serology    Diabetes mellitus 01/2022    Hyperlipidemia 2015    Hypertension 2015    Insomnia 2015    Ulcerative colitis        Past Surgical History:   Procedure Laterality Date    BRONCHOSCOPY WITH FLUOROSCOPY Left 11/20/2023    Procedure: BRONCHOSCOPY, WITH FLUOROSCOPY;  Surgeon: Lisa Villarreal MD;  Location: [facility] ENDO;  Service: Pulmonary;  Laterality: Left;    BRONCHOSCOPY WITH FLUOROSCOPY N/A 11/30/2023    Procedure: BRONCHOSCOPY, WITH FLUOROSCOPY;  Surgeon: Lisa Villarreal MD;  Location: [facility] ENDO;  Service: Pulmonary;  Laterality: N/A;    CARDIAC ELECTROPHYSIOLOGY MAPPING AND ABLATION  2017    SVT    CHOLECYSTECTOMY  2011    COLONOSCOPY N/A 5/3/2022    Procedure: COLONOSCOPY;  Surgeon: Shan Jean III, MD;  Location: [facility];  Service: Endoscopy;  Laterality: N/A;    COLONOSCOPY N/A 3/16/2024    Procedure: COLONOSCOPY;  Surgeon: ALEKSANDER Ramos MD;  Location: [facility];  Service: Endoscopy;  Laterality: N/A;    COLONOSCOPY WITH FECAL MICROBIOTA TRANSFER N/A 3/21/2023    Procedure: COLONOSCOPY, WITH FECAL MICROBIOTA TRANSFER;  Surgeon: David Millan MD;  Location: [facility];  Service: Endoscopy;  Laterality: N/A;    ESOPHAGOGASTRODUODENOSCOPY N/A 4/24/2023    Procedure: EGD (ESOPHAGOGASTRODUODENOSCOPY);  Surgeon: Shan Jean III, MD;  Location: [facility];  Service: Endoscopy;  Laterality: N/A;    GANGLION CYST EXCISION Left 1992    UMBILICAL HERNIA REPAIR  2011    with mesh       Review of patient's allergies indicates:  No Known Allergies    Current Facility-Administered Medications on File Prior to Encounter   Medication    lactated ringers infusion     Current Outpatient Medications on File Prior to Encounter   Medication Sig    busPIRone (BUSPAR) 7.5 MG tablet Take 1 tablet (7.5 mg total) by mouth 3 (three) times daily.    cholestyramine (QUESTRAN) 4 gram  packet Take 1 packet by mouth 3 (three) times daily.    dicyclomine (BENTYL) 10 MG capsule Take 1 capsule (10 mg total) by mouth 3 (three) times daily as needed.    ergocalciferol (VITAMIN D2) 50,000 unit Cap Take 1 capsule (50,000 Units total) by mouth every 7 days.    HYDROcodone-acetaminophen (NORCO) 5-325 mg per tablet Take 1 tablet by mouth every 6 (six) hours as needed for Pain.    lipase-protease-amylase 12,000-38,000-60,000 units (CREON) CpDR Take 3 capsules by mouth 3 (three) times daily with meals. (Patient not taking: Reported on 2024)    LORazepam (ATIVAN) 0.5 MG tablet Take 1 tablet (0.5 mg total) by mouth every 12 (twelve) hours as needed for Anxiety.    metFORMIN (GLUCOPHAGE-XR) 500 MG ER 24hr tablet Take 1 tablet (500 mg total) by mouth 2 (two) times daily with meals.    [] methylPREDNISolone (MEDROL) 8 MG tablet Take 7 tablets (56 mg total) by mouth once daily for 4 days, THEN 6 tablets (48 mg total) once daily for 4 days, THEN 5 tablets (40 mg total) once daily for 4 days, THEN 4 tablets (32 mg total) once daily for 3 days, THEN 3 tablets (24 mg total) once daily for 3 days, THEN 2 tablets (16 mg total) once daily for 3 days, THEN 1 tablet (8 mg total) once daily for 4 days.    metoprolol succinate (TOPROL-XL) 50 MG 24 hr tablet Take 1 tablet (50 mg total) by mouth once daily.    pantoprazole (PROTONIX) 40 MG tablet Take 1 tablet (40 mg total) by mouth 2 (two) times daily.    promethazine (PHENERGAN) 25 MG tablet Take 1 tablet (25 mg total) by mouth 4 (four) times daily as needed for Nausea.    semaglutide (OZEMPIC) 1 mg/dose (4 mg/3 mL) Inject 1 mg into the skin every 7 days.     sertraline (ZOLOFT) 100 MG tablet Take 1 tablet (100 mg total) by mouth once daily.    simvastatin (ZOCOR) 20 MG tablet Take 20 mg by mouth every evening.    varenicline (CHANTIX) 1 mg Tab Take 1 tablet (1 mg total) by mouth 2 (two) times daily.    zolpidem (AMBIEN) 10 mg Tab Take 1 tablet (10 mg  total) by mouth nightly as needed (insomnia).     Family History       Problem Relation (Age of Onset)    Asthma Mother          Tobacco Use    Smoking status: Former     Current packs/day: 1.00     Average packs/day: 1 pack/day for 31.4 years (31.4 ttl pk-yrs)     Types: Cigarettes     Start date: 1993    Smokeless tobacco: Never    Tobacco comments:     Pt states he has stopped smoking with Chantix three weeks ago. 12/1/23   Substance and Sexual Activity    Alcohol use: Yes     Comment: ocass    Drug use: Not Currently    Sexual activity: Not Currently     Review of Systems   Constitutional:  Negative for activity change and appetite change.   HENT:  Negative for congestion and dental problem.    Eyes:  Negative for discharge and itching.   Respiratory:  Negative for apnea and choking.    Cardiovascular:  Negative for chest pain and leg swelling.   Gastrointestinal:  Positive for abdominal pain and diarrhea. Negative for abdominal distention, anal bleeding and blood in stool.   Endocrine: Negative for cold intolerance and heat intolerance.   Genitourinary:  Negative for difficulty urinating, dysuria and enuresis.   Musculoskeletal:  Negative for arthralgias and back pain.   Allergic/Immunologic: Negative for environmental allergies and food allergies.   Neurological:  Negative for dizziness, facial asymmetry and headaches.     Objective:     Vital Signs (Most Recent):  Temp: 97.5 °F (36.4 °C) (06/03/24 2140)  Pulse: 82 (06/04/24 0430)  Resp: 17 (06/04/24 0624)  BP: 138/79 (06/04/24 0430)  SpO2: 97 % (06/04/24 0430) Vital Signs (24h Range):  Temp:  [97.5 °F (36.4 °C)] 97.5 °F (36.4 °C)  Pulse:  [82-94] 82  Resp:  [16-18] 17  SpO2:  [97 %-100 %] 97 %  BP: (128-140)/(73-89) 138/79     Weight: 122.5 kg (270 lb)  Body mass index is 44.93 kg/m².     Physical Exam  Constitutional:       General: He is not in acute distress.     Appearance: Normal appearance. He is not ill-appearing.   HENT:      Head: Normocephalic and  "atraumatic.      Right Ear: There is no impacted cerumen.      Left Ear: There is no impacted cerumen.      Nose: No congestion or rhinorrhea.      Mouth/Throat:      Pharynx: No oropharyngeal exudate or posterior oropharyngeal erythema.   Eyes:      General:         Right eye: No discharge.         Left eye: No discharge.   Cardiovascular:      Rate and Rhythm: Normal rate and regular rhythm.      Heart sounds: No murmur heard.     No friction rub.   Pulmonary:      Effort: No respiratory distress.      Breath sounds: Normal breath sounds. No stridor.   Abdominal:      General: There is no distension.      Palpations: There is no mass.      Tenderness: There is abdominal tenderness.   Musculoskeletal:         General: No swelling or tenderness.      Cervical back: No rigidity or tenderness.   Skin:     Coloration: Skin is not jaundiced or pale.                Significant Labs: All pertinent labs within the past 24 hours have been reviewed.  Blood Culture: No results for input(s): "LABBLOO" in the last 48 hours.  BMP:   Recent Labs   Lab 06/03/24  2205   GLU 95   *   K 3.7      CO2 20*   BUN 9   CREATININE 1.2   CALCIUM 9.3     CBC:   Recent Labs   Lab 06/03/24  2205   WBC 8.72   HGB 13.0*   HCT 40.9        CMP:   Recent Labs   Lab 06/03/24  2205   *   K 3.7      CO2 20*   GLU 95   BUN 9   CREATININE 1.2   CALCIUM 9.3   PROT 7.0   ALBUMIN 3.6   BILITOT 0.3   ALKPHOS 60   AST 7*   ALT 13   ANIONGAP 11     Recent Labs   Lab 06/03/24  2205   LIPASE <3*       TSH:   Recent Labs   Lab 04/10/24  0750   TSH 1.101     Imaging Results              CT Abdomen Pelvis With IV Contrast NO Oral Contrast (Final result)  Result time 06/04/24 02:28:26      Final result by Russ Medina MD (06/04/24 02:28:26)                   Impression:      Nonspecific proctocolitis involving the rectum and left colon, as above, similar compared to prior.    Bilateral nonobstructing renal calculi, similar " compared to prior.      Electronically signed by: Russ Medina MD  Date:    06/04/2024  Time:    02:28               Narrative:    EXAMINATION:  CT ABDOMEN PELVIS WITH IV CONTRAST    CLINICAL HISTORY:  LLQ abdominal pain;Nausea/vomiting;    TECHNIQUE:  Low dose axial images, sagittal and coronal reformations were obtained from the lung bases to the pubic symphysis following the IV administration of 100 mL of Omnipaque 350 .  Oral contrast was not administered.    COMPARISON:  06/01/2024.    FINDINGS:  Abdomen:    - Lower thorax:Unremarkable.    - Lung bases: No infiltrates and no nodules.    - Liver: Mild hepatomegaly, similar to prior.  No focal mass.    - Gallbladder: Surgically absent.    - Bile Ducts: No evidence of intra or extra hepatic biliary ductal dilation.    - Spleen: Negative.    - Kidneys: Bilateral nonobstructing renal calculi, similar to prior.  No hydronephrosis.    - Adrenals: Unremarkable.    - Pancreas: No mass or peripancreatic fat stranding.    - Retroperitoneum:  No significant adenopathy.    - Vascular: No abdominal aortic aneurysm.    - Abdominal wall:  Unremarkable.    Pelvis:    No pelvic mass, adenopathy, or free fluid.  1.4 cm calcification in the anterior pelvis, unchanged from prior    Bowel/Mesentery:    No bowel obstruction.  Duodenal diverticulum, without significant change from prior.  Diffuse wall thickening and pericolic fat stranding involving the rectum and the left colon from the sigmoid colon to the transverse colon, similar compared to prior, consistent with nonspecific proctocolitis without significant interval change from prior study.    Bones:  No acute osseous abnormality and no suspicious lytic or blastic lesion.                                     Significant Imaging: I have reviewed all pertinent imaging results/findings within the past 24 hours.

## 2024-06-04 NOTE — NURSING
Pt laying in bed. Wheels locked. Side rails up x2. Pt states comfort. No ss of distress noted. Call light w/I reach. Pt educated on plan of care and states understanding. Report called to BROOKE Arroyo by BROOKE Chaudhary. Hourly rounds completed.

## 2024-06-04 NOTE — PROGRESS NOTES
Pharmacist Renal Dose Adjustment Note    Jacoby Jean-Baptiste is a 49 y.o. male being treated with the medication Lovenox 40 mg daily.    Patient Data:    Vital Signs (Most Recent):  Temp: 97.5 °F (36.4 °C) (06/03/24 2140)  Pulse: 82 (06/04/24 0430)  Resp: 17 (06/04/24 0624)  BP: 138/79 (06/04/24 0430)  SpO2: 97 % (06/04/24 0430) Vital Signs (72h Range):  Temp:  [97.5 °F (36.4 °C)]   Pulse:  [82-94]   Resp:  [16-18]   BP: (128-140)/(73-89)   SpO2:  [97 %-100 %]      Recent Labs   Lab 06/01/24  1516 06/03/24 2205   CREATININE 1.3 1.2     Serum creatinine: 1.2 mg/dL 06/03/24 2205  Estimated creatinine clearance: 89.5 mL/min    Lovenox 40 mg daily will be changed to Lovenox 40 mg twice daily for DVT Ppx per dosing protocol for BMI > 40.    Pharmacist's Name: Kota Yan  Pharmacist's Extension: [phone]

## 2024-06-05 ENCOUNTER — ANESTHESIA EVENT (OUTPATIENT)
Dept: SURGERY | Facility: HOSPITAL | Age: 49
DRG: 386 | End: 2024-06-05
Payer: COMMERCIAL

## 2024-06-05 ENCOUNTER — ANESTHESIA (OUTPATIENT)
Dept: SURGERY | Facility: HOSPITAL | Age: 49
DRG: 386 | End: 2024-06-05
Payer: COMMERCIAL

## 2024-06-05 VITALS
OXYGEN SATURATION: 100 % | SYSTOLIC BLOOD PRESSURE: 122 MMHG | RESPIRATION RATE: 7 BRPM | DIASTOLIC BLOOD PRESSURE: 75 MMHG | HEART RATE: 92 BPM

## 2024-06-05 LAB
ALBUMIN SERPL BCP-MCNC: 3.3 G/DL (ref 3.5–5.2)
ALP SERPL-CCNC: 54 U/L (ref 55–135)
ALT SERPL W/O P-5'-P-CCNC: 11 U/L (ref 10–44)
ANION GAP SERPL CALC-SCNC: 7 MMOL/L (ref 8–16)
AST SERPL-CCNC: 7 U/L (ref 10–40)
ASTROVIRUS: NOT DETECTED
BASOPHILS # BLD AUTO: 0.03 K/UL (ref 0–0.2)
BASOPHILS NFR BLD: 0.4 % (ref 0–1.9)
BILIRUB SERPL-MCNC: 0.3 MG/DL (ref 0.1–1)
BUN SERPL-MCNC: 8 MG/DL (ref 6–20)
C COLI+JEJ+UPSA DNA STL QL NAA+NON-PROBE: NOT DETECTED
CALCIUM SERPL-MCNC: 9 MG/DL (ref 8.7–10.5)
CHLORIDE SERPL-SCNC: 103 MMOL/L (ref 95–110)
CO2 SERPL-SCNC: 25 MMOL/L (ref 23–29)
CREAT SERPL-MCNC: 1.1 MG/DL (ref 0.5–1.4)
CYCLOSPORA CAYETANENSIS: NOT DETECTED
DIFFERENTIAL METHOD BLD: ABNORMAL
ENTEROAGGREGATIVE E COLI: NOT DETECTED
ENTEROPATHOGENIC E COLI: NOT DETECTED
EOSINOPHIL # BLD AUTO: 0.2 K/UL (ref 0–0.5)
EOSINOPHIL NFR BLD: 2.2 % (ref 0–8)
ERYTHROCYTE [DISTWIDTH] IN BLOOD BY AUTOMATED COUNT: 15.7 % (ref 11.5–14.5)
EST. GFR  (NO RACE VARIABLE): >60 ML/MIN/1.73 M^2
GLUCOSE SERPL-MCNC: 67 MG/DL (ref 70–110)
GPP - ADENOVIRUS 40/41: NOT DETECTED
GPP - CRYPTOSPORIDIUM: NOT DETECTED
GPP - ENTAMOEBA HISTOLYTICA: NOT DETECTED
GPP - ENTEROTOXIGENIC E COLI (ETEC): NOT DETECTED
GPP - GIARDIA LAMBLIA: NOT DETECTED
GPP - NOROVIRUS GI/GII: NOT DETECTED
GPP - ROTAVIRUS A: NOT DETECTED
GPP - SALMONELLA: NOT DETECTED
GPP - VIBRIO CHOLERA: NOT DETECTED
GPP - YERSINIA ENTEROCOLITICA: NOT DETECTED
HCT VFR BLD AUTO: 38.2 % (ref 40–54)
HGB BLD-MCNC: 12.3 G/DL (ref 14–18)
IMM GRANULOCYTES # BLD AUTO: 0.02 K/UL (ref 0–0.04)
IMM GRANULOCYTES NFR BLD AUTO: 0.3 % (ref 0–0.5)
LACTATE PLASV-SCNC: NOT DETECTED MMOL/L
LYMPHOCYTES # BLD AUTO: 1.7 K/UL (ref 1–4.8)
LYMPHOCYTES NFR BLD: 23.9 % (ref 18–48)
MAGNESIUM SERPL-MCNC: 1.2 MG/DL (ref 1.6–2.6)
MCH RBC QN AUTO: 31.1 PG (ref 27–31)
MCHC RBC AUTO-ENTMCNC: 32.2 G/DL (ref 32–36)
MCV RBC AUTO: 97 FL (ref 82–98)
MONOCYTES # BLD AUTO: 1.2 K/UL (ref 0.3–1)
MONOCYTES NFR BLD: 16 % (ref 4–15)
NEUTROPHILS # BLD AUTO: 4.1 K/UL (ref 1.8–7.7)
NEUTROPHILS NFR BLD: 57.2 % (ref 38–73)
NRBC BLD-RTO: 0 /100 WBC
PLATELET # BLD AUTO: 248 K/UL (ref 150–450)
PLESIOMONAS SHIGELLOIDES: NOT DETECTED
PMV BLD AUTO: 9.9 FL (ref 9.2–12.9)
POTASSIUM SERPL-SCNC: 3.6 MMOL/L (ref 3.5–5.1)
PROT SERPL-MCNC: 6.3 G/DL (ref 6–8.4)
RBC # BLD AUTO: 3.96 M/UL (ref 4.6–6.2)
SAPOVIRUS: NOT DETECTED
SHIGELLA SP+EIEC IPAH STL QL NAA+PROBE: NOT DETECTED
SODIUM SERPL-SCNC: 135 MMOL/L (ref 136–145)
VIBRIO: NOT DETECTED
WBC # BLD AUTO: 7.19 K/UL (ref 3.9–12.7)

## 2024-06-05 PROCEDURE — 85025 COMPLETE CBC W/AUTO DIFF WBC: CPT | Performed by: INTERNAL MEDICINE

## 2024-06-05 PROCEDURE — 0DBB8ZX EXCISION OF ILEUM, VIA NATURAL OR ARTIFICIAL OPENING ENDOSCOPIC, DIAGNOSTIC: ICD-10-PCS | Performed by: INTERNAL MEDICINE

## 2024-06-05 PROCEDURE — 36415 COLL VENOUS BLD VENIPUNCTURE: CPT | Performed by: INTERNAL MEDICINE

## 2024-06-05 PROCEDURE — 87255 GENET VIRUS ISOLATE HSV: CPT | Performed by: INTERNAL MEDICINE

## 2024-06-05 PROCEDURE — D9220A PRA ANESTHESIA: Mod: CRNA,,, | Performed by: NURSE ANESTHETIST, CERTIFIED REGISTERED

## 2024-06-05 PROCEDURE — 37000009 HC ANESTHESIA EA ADD 15 MINS: Performed by: INTERNAL MEDICINE

## 2024-06-05 PROCEDURE — 30000890 LABCORP MISCELLANEOUS TEST: Mod: 91 | Performed by: INTERNAL MEDICINE

## 2024-06-05 PROCEDURE — 21400001 HC TELEMETRY ROOM

## 2024-06-05 PROCEDURE — 25000003 PHARM REV CODE 250: Performed by: INTERNAL MEDICINE

## 2024-06-05 PROCEDURE — 83735 ASSAY OF MAGNESIUM: CPT | Performed by: INTERNAL MEDICINE

## 2024-06-05 PROCEDURE — 27200043 HC FORCEPS, BIOPSY: Performed by: INTERNAL MEDICINE

## 2024-06-05 PROCEDURE — 63600175 PHARM REV CODE 636 W HCPCS: Performed by: INTERNAL MEDICINE

## 2024-06-05 PROCEDURE — 87507 IADNA-DNA/RNA PROBE TQ 12-25: CPT | Performed by: INTERNAL MEDICINE

## 2024-06-05 PROCEDURE — 45380 COLONOSCOPY AND BIOPSY: CPT | Performed by: INTERNAL MEDICINE

## 2024-06-05 PROCEDURE — 25000003 PHARM REV CODE 250: Performed by: NURSE ANESTHETIST, CERTIFIED REGISTERED

## 2024-06-05 PROCEDURE — 87496 CYTOMEG DNA AMP PROBE: CPT | Performed by: INTERNAL MEDICINE

## 2024-06-05 PROCEDURE — 43239 EGD BIOPSY SINGLE/MULTIPLE: CPT | Performed by: INTERNAL MEDICINE

## 2024-06-05 PROCEDURE — 87255 GENET VIRUS ISOLATE HSV: CPT | Mod: 91 | Performed by: INTERNAL MEDICINE

## 2024-06-05 PROCEDURE — D9220A PRA ANESTHESIA: Mod: ANES,,, | Performed by: STUDENT IN AN ORGANIZED HEALTH CARE EDUCATION/TRAINING PROGRAM

## 2024-06-05 PROCEDURE — 37000008 HC ANESTHESIA 1ST 15 MINUTES: Performed by: INTERNAL MEDICINE

## 2024-06-05 PROCEDURE — 0DB58ZX EXCISION OF ESOPHAGUS, VIA NATURAL OR ARTIFICIAL OPENING ENDOSCOPIC, DIAGNOSTIC: ICD-10-PCS | Performed by: INTERNAL MEDICINE

## 2024-06-05 PROCEDURE — 0DB68ZX EXCISION OF STOMACH, VIA NATURAL OR ARTIFICIAL OPENING ENDOSCOPIC, DIAGNOSTIC: ICD-10-PCS | Performed by: INTERNAL MEDICINE

## 2024-06-05 PROCEDURE — 80053 COMPREHEN METABOLIC PANEL: CPT | Performed by: INTERNAL MEDICINE

## 2024-06-05 PROCEDURE — 0DBE8ZX EXCISION OF LARGE INTESTINE, VIA NATURAL OR ARTIFICIAL OPENING ENDOSCOPIC, DIAGNOSTIC: ICD-10-PCS | Performed by: INTERNAL MEDICINE

## 2024-06-05 RX ORDER — DIPHENOXYLATE HYDROCHLORIDE AND ATROPINE SULFATE 2.5; .025 MG/1; MG/1
1 TABLET ORAL 4 TIMES DAILY PRN
Status: DISCONTINUED | OUTPATIENT
Start: 2024-06-05 | End: 2024-06-08 | Stop reason: HOSPADM

## 2024-06-05 RX ORDER — PROPOFOL 10 MG/ML
VIAL (ML) INTRAVENOUS
Status: DISCONTINUED | OUTPATIENT
Start: 2024-06-05 | End: 2024-06-05

## 2024-06-05 RX ORDER — HYDROCORTISONE 100 MG/60ML
100 SUSPENSION RECTAL NIGHTLY
Status: DISCONTINUED | OUTPATIENT
Start: 2024-06-05 | End: 2024-06-08 | Stop reason: HOSPADM

## 2024-06-05 RX ADMIN — SERTRALINE HYDROCHLORIDE 100 MG: 50 TABLET ORAL at 07:06

## 2024-06-05 RX ADMIN — POTASSIUM BICARBONATE 50 MEQ: 977.5 TABLET, EFFERVESCENT ORAL at 07:06

## 2024-06-05 RX ADMIN — Medication 30 MG: at 01:06

## 2024-06-05 RX ADMIN — HYDROCODONE BITARTRATE AND ACETAMINOPHEN 1 TABLET: 5; 325 TABLET ORAL at 10:06

## 2024-06-05 RX ADMIN — SODIUM CHLORIDE: 9 INJECTION, SOLUTION INTRAVENOUS at 04:06

## 2024-06-05 RX ADMIN — HYDROMORPHONE HYDROCHLORIDE 0.5 MG: 0.5 INJECTION, SOLUTION INTRAMUSCULAR; INTRAVENOUS; SUBCUTANEOUS at 06:06

## 2024-06-05 RX ADMIN — HYDROMORPHONE HYDROCHLORIDE 0.5 MG: 0.5 INJECTION, SOLUTION INTRAMUSCULAR; INTRAVENOUS; SUBCUTANEOUS at 11:06

## 2024-06-05 RX ADMIN — VARENICLINE TARTRATE 1 MG: 0.5 TABLET, FILM COATED ORAL at 07:06

## 2024-06-05 RX ADMIN — PANCRELIPASE 3 CAPSULE: 60000; 12000; 38000 CAPSULE, DELAYED RELEASE PELLETS ORAL at 03:06

## 2024-06-05 RX ADMIN — Medication 800 MG: at 11:06

## 2024-06-05 RX ADMIN — DIPHENOXYLATE HYDROCHLORIDE AND ATROPINE SULFATE 1 TABLET: 2.5; .025 TABLET ORAL at 06:06

## 2024-06-05 RX ADMIN — BUSPIRONE HYDROCHLORIDE 7.5 MG: 5 TABLET ORAL at 08:06

## 2024-06-05 RX ADMIN — PANTOPRAZOLE SODIUM 40 MG: 40 TABLET, DELAYED RELEASE ORAL at 07:06

## 2024-06-05 RX ADMIN — HYDROCODONE BITARTRATE AND ACETAMINOPHEN 1 TABLET: 5; 325 TABLET ORAL at 08:06

## 2024-06-05 RX ADMIN — Medication 100 MG: at 01:06

## 2024-06-05 RX ADMIN — SODIUM CHLORIDE: 0.9 INJECTION, SOLUTION INTRAVENOUS at 01:06

## 2024-06-05 RX ADMIN — BUSPIRONE HYDROCHLORIDE 7.5 MG: 5 TABLET ORAL at 07:06

## 2024-06-05 RX ADMIN — PROMETHAZINE HYDROCHLORIDE 25 MG: 25 TABLET ORAL at 08:06

## 2024-06-05 RX ADMIN — Medication 800 MG: at 07:06

## 2024-06-05 RX ADMIN — ZOLPIDEM TARTRATE 10 MG: 5 TABLET, COATED ORAL at 08:06

## 2024-06-05 RX ADMIN — MAGESIUM CITRATE 296 ML: 1.75 LIQUID ORAL at 05:06

## 2024-06-05 RX ADMIN — HYDROCODONE BITARTRATE AND ACETAMINOPHEN 1 TABLET: 5; 325 TABLET ORAL at 03:06

## 2024-06-05 RX ADMIN — LORAZEPAM 0.5 MG: 0.5 TABLET ORAL at 08:06

## 2024-06-05 RX ADMIN — BUSPIRONE HYDROCHLORIDE 7.5 MG: 5 TABLET ORAL at 03:06

## 2024-06-05 RX ADMIN — DICYCLOMINE HYDROCHLORIDE 10 MG: 10 CAPSULE ORAL at 10:06

## 2024-06-05 RX ADMIN — CHOLESTYRAMINE LIGHT 4 G: 4 POWDER, FOR SUSPENSION ORAL at 08:06

## 2024-06-05 RX ADMIN — VARENICLINE TARTRATE 1 MG: 0.5 TABLET, FILM COATED ORAL at 08:06

## 2024-06-05 RX ADMIN — HYDROCORTISONE 100 MG: 100 ENEMA RECTAL at 09:06

## 2024-06-05 RX ADMIN — ENOXAPARIN SODIUM 40 MG: 40 INJECTION SUBCUTANEOUS at 08:06

## 2024-06-05 NOTE — PROVATION PATIENT INSTRUCTIONS
Discharge Summary/Instructions after an Endoscopic Procedure  Patient Name: Jacoby Jean-Baptiste  Patient MRN: [id number]  Patient YOB: 1975 Wednesday, June 5, 2024  Odalis Mccabe MD  RESTRICTIONS:  During your procedure today, you received medications for sedation.  These   medications may affect your judgment, balance and coordination.  Therefore,   for 24 hours, you have the following restrictions:   - DO NOT drive a car, operate machinery, make legal/financial decisions,   sign important papers or drink alcohol.    ACTIVITY:  Today: no heavy lifting, straining or running due to procedural   sedation/anesthesia.  The following day: return to full activity including work.  DIET:  Eat and drink normally unless instructed otherwise.     TREATMENT FOR COMMON SIDE EFFECTS:  - Mild abdominal pain, nausea, belching, bloating or excessive gas:  rest,   eat lightly and use a heating pad.  - Sore Throat: treat with throat lozenges and/or gargle with warm salt   water.  - Because air was used during the procedure, expelling large amounts of air   from your rectum or belching is normal.  - If a bowel prep was taken, you may not have a bowel movement for 1-3 days.    This is normal.  SYMPTOMS TO WATCH FOR AND REPORT TO YOUR PHYSICIAN:  1. Abdominal pain or bloating, other than gas cramps.  2. Chest pain.  3. Back pain.  4. Signs of infection such as: chills or fever occurring within 24 hours   after the procedure.  5. Rectal bleeding, which would show as bright red, maroon, or black stools.   (A tablespoon of blood from the rectum is not serious, especially if   hemorrhoids are present.)  6. Vomiting.  7. Weakness or dizziness.  GO DIRECTLY TO THE NEAREST EMERGENCY ROOM IF YOU HAVE ANY OF THE FOLLOWING:      Difficulty breathing              Chills and/or fever over 101 F   Persistent vomiting and/or vomiting blood   Severe abdominal pain   Severe chest pain   Black, tarry stools   Bleeding- more than one  tablespoon   Any other symptom or condition that you feel may need urgent attention  Your doctor recommends these additional instructions:  If any biopsies were taken, your doctors clinic will contact you in 1 to 2   weeks with any results.  - Return patient to hospital [facility] for ongoing care.   - Await pathology results.   - Perform a colonoscopy today.  For questions, problems or results please call your physician - Odalis Mccabe MD at Work:  [phone].  [facility], EMERGENCY ROOM PHONE NUMBER: [phone]  IF A COMPLICATION OR EMERGENCY SITUATION ARISES AND YOU ARE UNABLE TO REACH   YOUR PHYSICIAN - GO DIRECTLY TO THE EMERGENCY ROOM.  Odalis Mccabe MD  6/5/2024 1:54:56 PM  This report has been verified and signed electronically.  Dear patient,  As a result of recent federal legislation (The Federal Cures Act), you may   receive lab or pathology results from your procedure in your [facility]   account before your physician is able to contact you. Your physician or   their representative will relay the results to you with their   recommendations at their soonest availability.  Thank you,  [employer]OVATION

## 2024-06-05 NOTE — ASSESSMENT & PLAN NOTE
Patient is status post colonoscopy and endoscopy on 06/05/2024.  Biopsy were taken.  Continue steroid and continue to monitor

## 2024-06-05 NOTE — NURSING
Pt. seen for Wound Consult.    Cleansed Abdominal Ulcer with NS and gauze. Patted dry with gauze. Applied double layered Xeroform gauze to opening. Covered with dry gauze and secured with tape. Jose. well.

## 2024-06-05 NOTE — NURSING
Patient refused IV fluids during the night.  He said he was having diarrhea and it was too difficult to take the IV with him to the bathroom.  He kept saying he would take them later but each time I offered to restart his IV fluids he refused,

## 2024-06-05 NOTE — TRANSFER OF CARE
"Anesthesia Transfer of Care Note    Patient: Jacoby Jean-Baptiste    Procedure(s) Performed: Procedure(s) (LRB):  SIGMOIDOSCOPY, FLEXIBLE (N/A)  EGD (ESOPHAGOGASTRODUODENOSCOPY) (N/A)    Patient location: GI    Anesthesia Type: general    Transport from OR: Transported from OR on room air with adequate spontaneous ventilation    Post pain: adequate analgesia    Post assessment: no apparent anesthetic complications and tolerated procedure well    Post vital signs: stable    Level of consciousness: awake, alert and oriented    Nausea/Vomiting: no nausea/vomiting    Complications: none    Transfer of care protocol was followed      Last vitals: Visit Vitals  /62 (BP Location: Left arm, Patient Position: Lying)   Pulse 93   Temp 36.8 °C (98.2 °F) (Temporal)   Resp 16   Ht 5' 5" (1.651 m)   Wt 119.7 kg (264 lb)   SpO2 98%   BMI 43.93 kg/m²     "

## 2024-06-05 NOTE — SUBJECTIVE & OBJECTIVE
Interval History:  Patient said he is doing well and he just had a shower    Review of Systems   All other systems reviewed and are negative.    Objective:     Vital Signs (Most Recent):  Temp: 96.9 °F (36.1 °C) (06/05/24 1355)  Pulse: 80 (06/05/24 1450)  Resp: 16 (06/05/24 1519)  BP: 121/66 (06/05/24 1450)  SpO2: 97 % (06/05/24 1450) Vital Signs (24h Range):  Temp:  [96.9 °F (36.1 °C)-98.4 °F (36.9 °C)] 96.9 °F (36.1 °C)  Pulse:  [] 80  Resp:  [0-18] 16  SpO2:  [93 %-100 %] 97 %  BP: ()/(57-75) 121/66     Weight: 119.7 kg (264 lb)  Body mass index is 43.93 kg/m².    Intake/Output Summary (Last 24 hours) at 6/5/2024 1612  Last data filed at 6/5/2024 1348  Gross per 24 hour   Intake 340 ml   Output --   Net 340 ml         Physical Exam  Cardiovascular:      Rate and Rhythm: Normal rate and regular rhythm.   Pulmonary:      Effort: Pulmonary effort is normal.      Breath sounds: Normal breath sounds.   Abdominal:      General: Abdomen is flat.      Palpations: Abdomen is soft.   Neurological:      Mental Status: He is oriented to person, place, and time.             Significant Labs: All pertinent labs within the past 24 hours have been reviewed.    Significant Imaging: I have reviewed all pertinent imaging results/findings within the past 24 hours.

## 2024-06-05 NOTE — PROGRESS NOTES
[facility] Medicine  Progress Note    Patient Name: Jacoby Jean-Baptiste  MRN: [id number]  Patient Class: IP- Inpatient   Admission Date: 6/3/2024  Length of Stay: 1 days  Attending Physician: Matteo Melendez DO  Primary Care Provider: Hunter Aguilar III, MD        Subjective:     Principal Problem:Ulcerative colitis        HPI:  This is a case of 49-year-old male with past medical history of insomnia, anxiety, depression known ulcerative colitis, hypertension.  Patient presented to the emergency department's with a complaint of constant pain of the abdomen with exacerbation of his colitis.  He said he gets medication once a month.  He did not have his medications list with him.  He has a gastroenterologist as an outpatient that he saw last month however he feels the pain is uncontrolled in his abdomen at this point.  Nothing made it better nothing made it worse.  He denied any nausea or vomiting.    In the emergency department's patient has white blood count of 8, he will hemoglobin 13, platelets 283, sed rate 46, CRP 6.9    Overview/Hospital Course:  Patient was admitted to the hospital medicine service for acute exacerbation of ulcerative colitis.  GI was consulted.  Patient had endoscopy and colonoscopy done.  Non-bleeding duodenal diverticulum noted during EGD and ancolitis ulcerative colitis noted on the colonoscopy which was unchanged compared to previous examinations. Biopsied. Patient was started on steroid and monitored overnight.    Interval History:  Patient said he is doing well and he just had a shower    Review of Systems   All other systems reviewed and are negative.    Objective:     Vital Signs (Most Recent):  Temp: 96.9 °F (36.1 °C) (06/05/24 1355)  Pulse: 80 (06/05/24 1450)  Resp: 16 (06/05/24 1519)  BP: 121/66 (06/05/24 1450)  SpO2: 97 % (06/05/24 1450) Vital Signs (24h Range):  Temp:  [96.9 °F (36.1 °C)-98.4 °F (36.9 °C)] 96.9 °F (36.1 °C)  Pulse:  [] 80  Resp:   [0-18] 16  SpO2:  [93 %-100 %] 97 %  BP: ()/(57-75) 121/66     Weight: 119.7 kg (264 lb)  Body mass index is 43.93 kg/m².    Intake/Output Summary (Last 24 hours) at 6/5/2024 1612  Last data filed at 6/5/2024 1348  Gross per 24 hour   Intake 340 ml   Output --   Net 340 ml         Physical Exam  Cardiovascular:      Rate and Rhythm: Normal rate and regular rhythm.   Pulmonary:      Effort: Pulmonary effort is normal.      Breath sounds: Normal breath sounds.   Abdominal:      General: Abdomen is flat.      Palpations: Abdomen is soft.   Neurological:      Mental Status: He is oriented to person, place, and time.             Significant Labs: All pertinent labs within the past 24 hours have been reviewed.    Significant Imaging: I have reviewed all pertinent imaging results/findings within the past 24 hours.    Assessment/Plan:      * Ulcerative colitis  Patient is status post colonoscopy and endoscopy on 06/05/2024.  Biopsy were taken.  Continue steroid and continue to monitor      Depression  Chronic, continue home medication of Cymbalta        Hyperlipemia  Hold statin for now given the presentation of uncontrolled ulcerative colitis      History of supraventricular tachycardia  Patient is able to on metoprolol.  He is in sinus rhythm and regular rate.      Insomnia    Continue home medication of Ambien      VTE Risk Mitigation (From admission, onward)           Ordered     enoxaparin injection 40 mg  Every 12 hours         06/04/24 0628     IP VTE HIGH RISK PATIENT  Once         06/04/24 0623     Place sequential compression device  Until discontinued         06/04/24 0623                    Discharge Planning   GERARDO: 6/7/2024     Code Status: Full Code   Is the patient medically ready for discharge?:     Reason for patient still in hospital (select all that apply): Patient trending condition  Discharge Plan A: Home                  Matteo Melendez DO  Department of Hospital Medicine   [facility]  [facility]

## 2024-06-05 NOTE — ANESTHESIA POSTPROCEDURE EVALUATION
Anesthesia Post Evaluation    Patient: Jacoby Jean-Baptiste    Procedure(s) Performed: Procedure(s) (LRB):  SIGMOIDOSCOPY, FLEXIBLE (N/A)  EGD (ESOPHAGOGASTRODUODENOSCOPY) (N/A)    Final Anesthesia Type: general      Patient location during evaluation: GI PACU  Patient participation: Yes- Able to Participate  Level of consciousness: awake and alert  Post-procedure vital signs: reviewed and stable  Pain management: adequate  Airway patency: patent    PONV status at discharge: No PONV  Anesthetic complications: no      Cardiovascular status: hemodynamically stable  Respiratory status: unassisted, spontaneous ventilation and room air  Hydration status: euvolemic  Follow-up not needed.              Event Time   Out of Recovery 06/05/2024 14:57:00         Pain/Kusum Score: Pain Rating Prior to Med Admin: 6 (6/5/2024  3:19 PM)  Pain Rating Post Med Admin: 0 (6/5/2024 12:03 PM)  Kusum Score: 10 (6/5/2024  2:01 PM)

## 2024-06-05 NOTE — ANESTHESIA PREPROCEDURE EVALUATION
06/05/2024  Jacoby Jean-Baptiste is a 49 y.o., male.      Patient Active Problem List   Diagnosis    Insomnia    Morbid obesity    History of supraventricular tachycardia    S/P radiofrequency ablation operation for arrhythmia    Hyperlipemia    Type 2 diabetes mellitus without complication, without long-term current use of insulin    Positive colorectal cancer screening using Cologuard test    Polyp of colon    BMI 45.0-49.9, adult    Palpitations    Anxiety    Colitis    Anemia    Leukocytosis    Bilateral nephrolithiasis, non obstructing    C. difficile diarrhea    Dehydration    Ulcerative colitis    Dental caries    History of Clostridioides difficile colitis    Status post fecal microbiota transplant    PUD (peptic ulcer disease)    Left-sided weakness    Chest pain    Former smoker    Hepatosplenomegaly    Aspergillosis    Long-term use of immunosuppressant medication    Other specified interstitial pulmonary diseases    Immunodeficiency due to drugs (CODE)    Long term systemic steroid user    Vitamin B12 deficiency    Depression       Past Surgical History:   Procedure Laterality Date    BRONCHOSCOPY WITH FLUOROSCOPY Left 11/20/2023    Procedure: BRONCHOSCOPY, WITH FLUOROSCOPY;  Surgeon: Lisa Villarreal MD;  Location: [facility] ENDO;  Service: Pulmonary;  Laterality: Left;    BRONCHOSCOPY WITH FLUOROSCOPY N/A 11/30/2023    Procedure: BRONCHOSCOPY, WITH FLUOROSCOPY;  Surgeon: Lisa Villarreal MD;  Location: [facility] ENDO;  Service: Pulmonary;  Laterality: N/A;    CARDIAC ELECTROPHYSIOLOGY MAPPING AND ABLATION  2017    SVT    CHOLECYSTECTOMY  2011    COLONOSCOPY N/A 5/3/2022    Procedure: COLONOSCOPY;  Surgeon: Shan Jean III, MD;  Location: [facility] ENDO;  Service: Endoscopy;  Laterality: N/A;    COLONOSCOPY N/A 3/16/2024    Procedure: COLONOSCOPY;  Surgeon: ALEKSANDER Ramos MD;  Location: [facility]  ENDO;  Service: Endoscopy;  Laterality: N/A;    COLONOSCOPY WITH FECAL MICROBIOTA TRANSFER N/A 3/21/2023    Procedure: COLONOSCOPY, WITH FECAL MICROBIOTA TRANSFER;  Surgeon: David Millan MD;  Location: [facility];  Service: Endoscopy;  Laterality: N/A;    ESOPHAGOGASTRODUODENOSCOPY N/A 4/24/2023    Procedure: EGD (ESOPHAGOGASTRODUODENOSCOPY);  Surgeon: Shan Jean III, MD;  Location: [facility];  Service: Endoscopy;  Laterality: N/A;    GANGLION CYST EXCISION Left 1992    UMBILICAL HERNIA REPAIR  2011    with mesh        Tobacco Use:  The patient  reports that he has quit smoking. His smoking use included cigarettes. He started smoking about 31 years ago. He has a 31.4 pack-year smoking history. He has never used smokeless tobacco.     Results for orders placed or performed during the hospital encounter of 01/30/24   EKG 12-lead    Collection Time: 01/30/24 10:26 AM    Narrative    Test Reason : R07.9,    Vent. Rate : 086 BPM     Atrial Rate : 086 BPM     P-R Int : 132 ms          QRS Dur : 074 ms      QT Int : 380 ms       P-R-T Axes : 026 041 039 degrees     QTc Int : 454 ms    Normal sinus rhythm  Normal ECG  When compared with ECG of 16-JAN-2024 15:40,  No significant change was found  Confirmed by Mynor RENTERIA, Harris ONEILL ([id number]) on 2/3/2024 12:27:42 PM    Referred By: [email or web address]   SELF           Confirmed By:Harris Lowe MD        Imaging Results              CT Abdomen Pelvis With IV Contrast NO Oral Contrast (Final result)  Result time 06/04/24 02:28:26      Final result by Russ Medina MD (06/04/24 02:28:26)                   Impression:      Nonspecific proctocolitis involving the rectum and left colon, as above, similar compared to prior.    Bilateral nonobstructing renal calculi, similar compared to prior.      Electronically signed by: Russ Medina MD  Date:    06/04/2024  Time:    02:28               Narrative:    EXAMINATION:  CT ABDOMEN PELVIS WITH IV CONTRAST    CLINICAL  HISTORY:  LLQ abdominal pain;Nausea/vomiting;    TECHNIQUE:  Low dose axial images, sagittal and coronal reformations were obtained from the lung bases to the pubic symphysis following the IV administration of 100 mL of Omnipaque 350 .  Oral contrast was not administered.    COMPARISON:  06/01/2024.    FINDINGS:  Abdomen:    - Lower thorax:Unremarkable.    - Lung bases: No infiltrates and no nodules.    - Liver: Mild hepatomegaly, similar to prior.  No focal mass.    - Gallbladder: Surgically absent.    - Bile Ducts: No evidence of intra or extra hepatic biliary ductal dilation.    - Spleen: Negative.    - Kidneys: Bilateral nonobstructing renal calculi, similar to prior.  No hydronephrosis.    - Adrenals: Unremarkable.    - Pancreas: No mass or peripancreatic fat stranding.    - Retroperitoneum:  No significant adenopathy.    - Vascular: No abdominal aortic aneurysm.    - Abdominal wall:  Unremarkable.    Pelvis:    No pelvic mass, adenopathy, or free fluid.  1.4 cm calcification in the anterior pelvis, unchanged from prior    Bowel/Mesentery:    No bowel obstruction.  Duodenal diverticulum, without significant change from prior.  Diffuse wall thickening and pericolic fat stranding involving the rectum and the left colon from the sigmoid colon to the transverse colon, similar compared to prior, consistent with nonspecific proctocolitis without significant interval change from prior study.    Bones:  No acute osseous abnormality and no suspicious lytic or blastic lesion.                                       Lab Results   Component Value Date    WBC 7.19 06/05/2024    HGB 12.3 (L) 06/05/2024    HCT 38.2 (L) 06/05/2024    MCV 97 06/05/2024     06/05/2024     BMP  Lab Results   Component Value Date     (L) 06/05/2024    K 3.6 06/05/2024     06/05/2024    CO2 25 06/05/2024    BUN 8 06/05/2024    CREATININE 1.1 06/05/2024    CALCIUM 9.0 06/05/2024    ANIONGAP 7 (L) 06/05/2024    GLU 67 (L) 06/05/2024     GLU 95 06/03/2024     06/01/2024       Results for orders placed during the hospital encounter of 11/17/23    Echo    Interpretation Summary    Left Ventricle: The left ventricle is normal in size. Normal wall thickness. There is concentric remodeling. Normal wall motion. There is normal systolic function with a visually estimated ejection fraction of 55 - 60%. There is normal diastolic function.    Right Ventricle: Normal right ventricular cavity size. Wall thickness is normal. Right ventricle wall motion  is normal. Systolic function is normal.    IVC/SVC: Normal venous pressure at 3 mmHg.          Pre-op Assessment    I have reviewed the Patient Summary Reports.     I have reviewed the Nursing Notes. I have reviewed the NPO Status.   I have reviewed the Medications.     Review of Systems  Anesthesia Hx:  No problems with previous Anesthesia             Denies Family Hx of Anesthesia complications.    Denies Personal Hx of Anesthesia complications.                    Social:  Former Smoker, Alcohol Use       Hematology/Oncology:    Oncology Normal    -- Anemia:                                  EENT/Dental:  EENT/Dental Normal           Cardiovascular:     Hypertension    Dysrhythmias       hyperlipidemia   ECG has been reviewed.                          Renal/:   renal calculi               Hepatic/GI:   PUD,   Liver Disease,            Musculoskeletal:  Musculoskeletal Normal                Neurological:    Neuromuscular Disease,                                   Endocrine:  Diabetes, type 2         Morbid Obesity / BMI > 40  Psych:   anxiety depression                Physical Exam  General: Well nourished, Cooperative, Alert and Oriented    Airway:  Mallampati: II   Mouth Opening: Normal  TM Distance: Normal  Tongue: Normal  Neck ROM: Normal ROM    Dental:  Intact    Chest/Lungs:  Clear to auscultation    Heart:  Rate: Normal  Rhythm: Regular Rhythm  Sounds: Normal        Anesthesia Plan  Type of  Anesthesia, risks & benefits discussed:    Anesthesia Type: Gen Natural Airway  Intra-op Monitoring Plan: Standard ASA Monitors  Induction:  IV  Informed Consent: Informed consent signed with the Patient and all parties understand the risks and agree with anesthesia plan.  All questions answered.   ASA Score: 4    Ready For Surgery From Anesthesia Perspective.     .

## 2024-06-05 NOTE — [employer] PATIENT INSTRUCTIONS
Discharge Summary/Instructions after an Endoscopic Procedure  Patient Name: Jacoby Jean-Baptiste  Patient MRN: [id number]  Patient YOB: 1975 Wednesday, June 5, 2024  Odalis Mccabe MD  RESTRICTIONS:  During your procedure today, you received medications for sedation.  These   medications may affect your judgment, balance and coordination.  Therefore,   for 24 hours, you have the following restrictions:   - DO NOT drive a car, operate machinery, make legal/financial decisions,   sign important papers or drink alcohol.    ACTIVITY:  Today: no heavy lifting, straining or running due to procedural   sedation/anesthesia.  The following day: return to full activity including work.  DIET:  Eat and drink normally unless instructed otherwise.     TREATMENT FOR COMMON SIDE EFFECTS:  - Mild abdominal pain, nausea, belching, bloating or excessive gas:  rest,   eat lightly and use a heating pad.  - Sore Throat: treat with throat lozenges and/or gargle with warm salt   water.  - Because air was used during the procedure, expelling large amounts of air   from your rectum or belching is normal.  - If a bowel prep was taken, you may not have a bowel movement for 1-3 days.    This is normal.  SYMPTOMS TO WATCH FOR AND REPORT TO YOUR PHYSICIAN:  1. Abdominal pain or bloating, other than gas cramps.  2. Chest pain.  3. Back pain.  4. Signs of infection such as: chills or fever occurring within 24 hours   after the procedure.  5. Rectal bleeding, which would show as bright red, maroon, or black stools.   (A tablespoon of blood from the rectum is not serious, especially if   hemorrhoids are present.)  6. Vomiting.  7. Weakness or dizziness.  GO DIRECTLY TO THE NEAREST EMERGENCY ROOM IF YOU HAVE ANY OF THE FOLLOWING:      Difficulty breathing              Chills and/or fever over 101 F   Persistent vomiting and/or vomiting blood   Severe abdominal pain   Severe chest pain   Black, tarry stools   Bleeding- more than one  tablespoon   Any other symptom or condition that you feel may need urgent attention  Your doctor recommends these additional instructions:  If any biopsies were taken, your doctors clinic will contact you in 1 to 2   weeks with any results.  - Return patient to hospital [facility] for ongoing care.   - Await pathology results.   - Repeat colonoscopy in 6 months to assess disease activity.  Stop pantoprazole as there was no ulcerative disease.  Check stool elastase and empiric trial of Creon.  Repeat stool PCR.  Stool   calprotectin was ordered and collected yesterday.    Add Lomotil and hydrocortisone enemas  For questions, problems or results please call your physician - Odalis Mccabe MD at Work:  [phone].  [facility], EMERGENCY ROOM PHONE NUMBER: [phone]  IF A COMPLICATION OR EMERGENCY SITUATION ARISES AND YOU ARE UNABLE TO REACH   YOUR PHYSICIAN - GO DIRECTLY TO THE EMERGENCY ROOM.  Odalis Mccabe MD  6/5/2024 2:07:51 PM  This report has been verified and signed electronically.  Dear patient,  As a result of recent federal legislation (The Federal Cures Act), you may   receive lab or pathology results from your procedure in your [facility]   account before your physician is able to contact you. Your physician or   their representative will relay the results to you with their   recommendations at their soonest availability.  Thank you,  [employer]ATION

## 2024-06-06 LAB
ALBUMIN SERPL BCP-MCNC: 2.8 G/DL (ref 3.5–5.2)
ALP SERPL-CCNC: 48 U/L (ref 55–135)
ALT SERPL W/O P-5'-P-CCNC: 9 U/L (ref 10–44)
ANION GAP SERPL CALC-SCNC: 5 MMOL/L (ref 8–16)
AST SERPL-CCNC: 6 U/L (ref 10–40)
BASOPHILS # BLD AUTO: 0.04 K/UL (ref 0–0.2)
BASOPHILS NFR BLD: 0.7 % (ref 0–1.9)
BILIRUB SERPL-MCNC: 0.2 MG/DL (ref 0.1–1)
BUN SERPL-MCNC: 8 MG/DL (ref 6–20)
CALCIUM SERPL-MCNC: 8 MG/DL (ref 8.7–10.5)
CHLORIDE SERPL-SCNC: 106 MMOL/L (ref 95–110)
CO2 SERPL-SCNC: 26 MMOL/L (ref 23–29)
CREAT SERPL-MCNC: 1.1 MG/DL (ref 0.5–1.4)
DIFFERENTIAL METHOD BLD: ABNORMAL
EOSINOPHIL # BLD AUTO: 0.3 K/UL (ref 0–0.5)
EOSINOPHIL NFR BLD: 4.3 % (ref 0–8)
ERYTHROCYTE [DISTWIDTH] IN BLOOD BY AUTOMATED COUNT: 16 % (ref 11.5–14.5)
EST. GFR  (NO RACE VARIABLE): >60 ML/MIN/1.73 M^2
ESTIMATED AVG GLUCOSE: 103 MG/DL (ref 68–131)
GLUCOSE SERPL-MCNC: 79 MG/DL (ref 70–110)
HBA1C MFR BLD: 5.2 % (ref 4.5–6.2)
HCT VFR BLD AUTO: 37.7 % (ref 40–54)
HGB BLD-MCNC: 11.7 G/DL (ref 14–18)
IMM GRANULOCYTES # BLD AUTO: 0.02 K/UL (ref 0–0.04)
IMM GRANULOCYTES NFR BLD AUTO: 0.3 % (ref 0–0.5)
LYMPHOCYTES # BLD AUTO: 1.4 K/UL (ref 1–4.8)
LYMPHOCYTES NFR BLD: 24.5 % (ref 18–48)
MAGNESIUM SERPL-MCNC: 1.6 MG/DL (ref 1.6–2.6)
MAGNESIUM SERPL-MCNC: 1.7 MG/DL (ref 1.6–2.6)
MCH RBC QN AUTO: 31 PG (ref 27–31)
MCHC RBC AUTO-ENTMCNC: 31 G/DL (ref 32–36)
MCV RBC AUTO: 100 FL (ref 82–98)
MONOCYTES # BLD AUTO: 0.7 K/UL (ref 0.3–1)
MONOCYTES NFR BLD: 11.4 % (ref 4–15)
NEUTROPHILS # BLD AUTO: 3.5 K/UL (ref 1.8–7.7)
NEUTROPHILS NFR BLD: 58.8 % (ref 38–73)
NRBC BLD-RTO: 0 /100 WBC
PLATELET # BLD AUTO: 201 K/UL (ref 150–450)
PMV BLD AUTO: 9 FL (ref 9.2–12.9)
POTASSIUM SERPL-SCNC: 3.5 MMOL/L (ref 3.5–5.1)
PROT SERPL-MCNC: 5.4 G/DL (ref 6–8.4)
RBC # BLD AUTO: 3.78 M/UL (ref 4.6–6.2)
SODIUM SERPL-SCNC: 137 MMOL/L (ref 136–145)
WBC # BLD AUTO: 5.88 K/UL (ref 3.9–12.7)

## 2024-06-06 PROCEDURE — 83036 HEMOGLOBIN GLYCOSYLATED A1C: CPT | Performed by: INTERNAL MEDICINE

## 2024-06-06 PROCEDURE — 63600175 PHARM REV CODE 636 W HCPCS: Performed by: INTERNAL MEDICINE

## 2024-06-06 PROCEDURE — 25000003 PHARM REV CODE 250: Performed by: INTERNAL MEDICINE

## 2024-06-06 PROCEDURE — 36415 COLL VENOUS BLD VENIPUNCTURE: CPT | Performed by: FAMILY MEDICINE

## 2024-06-06 PROCEDURE — 83735 ASSAY OF MAGNESIUM: CPT | Mod: 91 | Performed by: INTERNAL MEDICINE

## 2024-06-06 PROCEDURE — 21400001 HC TELEMETRY ROOM

## 2024-06-06 PROCEDURE — 80053 COMPREHEN METABOLIC PANEL: CPT | Performed by: INTERNAL MEDICINE

## 2024-06-06 PROCEDURE — 25000003 PHARM REV CODE 250: Performed by: FAMILY MEDICINE

## 2024-06-06 PROCEDURE — 83735 ASSAY OF MAGNESIUM: CPT | Performed by: FAMILY MEDICINE

## 2024-06-06 PROCEDURE — 36415 COLL VENOUS BLD VENIPUNCTURE: CPT | Performed by: INTERNAL MEDICINE

## 2024-06-06 PROCEDURE — 85025 COMPLETE CBC W/AUTO DIFF WBC: CPT | Performed by: INTERNAL MEDICINE

## 2024-06-06 RX ORDER — LANOLIN ALCOHOL/MO/W.PET/CERES
800 CREAM (GRAM) TOPICAL ONCE
Status: COMPLETED | OUTPATIENT
Start: 2024-06-06 | End: 2024-06-06

## 2024-06-06 RX ADMIN — CHOLESTYRAMINE LIGHT 4 G: 4 POWDER, FOR SUSPENSION ORAL at 09:06

## 2024-06-06 RX ADMIN — PANCRELIPASE 3 CAPSULE: 60000; 12000; 38000 CAPSULE, DELAYED RELEASE PELLETS ORAL at 05:06

## 2024-06-06 RX ADMIN — HYDROCORTISONE 100 MG: 100 ENEMA RECTAL at 09:06

## 2024-06-06 RX ADMIN — DIPHENOXYLATE HYDROCHLORIDE AND ATROPINE SULFATE 1 TABLET: 2.5; .025 TABLET ORAL at 09:06

## 2024-06-06 RX ADMIN — ENOXAPARIN SODIUM 40 MG: 40 INJECTION SUBCUTANEOUS at 09:06

## 2024-06-06 RX ADMIN — METOPROLOL SUCCINATE 50 MG: 50 TABLET, EXTENDED RELEASE ORAL at 08:06

## 2024-06-06 RX ADMIN — HYDROCODONE BITARTRATE AND ACETAMINOPHEN 1 TABLET: 5; 325 TABLET ORAL at 12:06

## 2024-06-06 RX ADMIN — BUSPIRONE HYDROCHLORIDE 7.5 MG: 5 TABLET ORAL at 08:06

## 2024-06-06 RX ADMIN — Medication 800 MG: at 10:06

## 2024-06-06 RX ADMIN — HYDROMORPHONE HYDROCHLORIDE 0.5 MG: 0.5 INJECTION, SOLUTION INTRAMUSCULAR; INTRAVENOUS; SUBCUTANEOUS at 12:06

## 2024-06-06 RX ADMIN — DICYCLOMINE HYDROCHLORIDE 10 MG: 10 CAPSULE ORAL at 09:06

## 2024-06-06 RX ADMIN — SERTRALINE HYDROCHLORIDE 100 MG: 50 TABLET ORAL at 08:06

## 2024-06-06 RX ADMIN — ZOLPIDEM TARTRATE 10 MG: 5 TABLET, COATED ORAL at 09:06

## 2024-06-06 RX ADMIN — PANCRELIPASE 3 CAPSULE: 60000; 12000; 38000 CAPSULE, DELAYED RELEASE PELLETS ORAL at 07:06

## 2024-06-06 RX ADMIN — PROMETHAZINE HYDROCHLORIDE 25 MG: 25 TABLET ORAL at 08:06

## 2024-06-06 RX ADMIN — HYDROCODONE BITARTRATE AND ACETAMINOPHEN 1 TABLET: 5; 325 TABLET ORAL at 06:06

## 2024-06-06 RX ADMIN — POTASSIUM BICARBONATE 50 MEQ: 977.5 TABLET, EFFERVESCENT ORAL at 06:06

## 2024-06-06 RX ADMIN — LORAZEPAM 0.5 MG: 0.5 TABLET ORAL at 09:06

## 2024-06-06 RX ADMIN — BUSPIRONE HYDROCHLORIDE 7.5 MG: 5 TABLET ORAL at 02:06

## 2024-06-06 RX ADMIN — Medication 800 MG: at 06:06

## 2024-06-06 RX ADMIN — VARENICLINE TARTRATE 1 MG: 0.5 TABLET, FILM COATED ORAL at 09:06

## 2024-06-06 RX ADMIN — SODIUM CHLORIDE: 9 INJECTION, SOLUTION INTRAVENOUS at 03:06

## 2024-06-06 RX ADMIN — PROMETHAZINE HYDROCHLORIDE 25 MG: 25 TABLET ORAL at 05:06

## 2024-06-06 RX ADMIN — METHYLPREDNISOLONE SODIUM SUCCINATE 60 MG: 40 INJECTION, POWDER, FOR SOLUTION INTRAMUSCULAR; INTRAVENOUS at 08:06

## 2024-06-06 RX ADMIN — Medication 800 MG: at 12:06

## 2024-06-06 RX ADMIN — VARENICLINE TARTRATE 1 MG: 0.5 TABLET, FILM COATED ORAL at 08:06

## 2024-06-06 RX ADMIN — PANCRELIPASE 3 CAPSULE: 60000; 12000; 38000 CAPSULE, DELAYED RELEASE PELLETS ORAL at 12:06

## 2024-06-06 RX ADMIN — ENOXAPARIN SODIUM 40 MG: 40 INJECTION SUBCUTANEOUS at 08:06

## 2024-06-06 RX ADMIN — BUSPIRONE HYDROCHLORIDE 7.5 MG: 5 TABLET ORAL at 09:06

## 2024-06-06 NOTE — SUBJECTIVE & OBJECTIVE
Interval History:  Patient said he still feels pains whenever he eats and his pain is not better than yesterday    Review of Systems   Gastrointestinal:  Positive for abdominal pain. Negative for abdominal distention.     Objective:     Vital Signs (Most Recent):  Temp: 98.1 °F (36.7 °C) (06/06/24 1125)  Pulse: 87 (06/06/24 1125)  Resp: 18 (06/06/24 1209)  BP: 111/64 (06/06/24 1125)  SpO2: 98 % (06/06/24 1125) Vital Signs (24h Range):  Temp:  [97.4 °F (36.3 °C)-98.2 °F (36.8 °C)] 98.1 °F (36.7 °C)  Pulse:  [] 87  Resp:  [16-18] 18  SpO2:  [96 %-99 %] 98 %  BP: (107-138)/(52-84) 111/64     Weight: 119.7 kg (264 lb)  Body mass index is 43.93 kg/m².    Intake/Output Summary (Last 24 hours) at 6/6/2024 1455  Last data filed at 6/6/2024 0009  Gross per 24 hour   Intake 240 ml   Output --   Net 240 ml         Physical Exam  Cardiovascular:      Rate and Rhythm: Normal rate and regular rhythm.      Pulses: Normal pulses.      Heart sounds: Normal heart sounds.   Pulmonary:      Effort: No respiratory distress.   Abdominal:      General: There is no distension.      Palpations: There is no mass.      Tenderness: There is abdominal tenderness.             Significant Labs: All pertinent labs within the past 24 hours have been reviewed.    Significant Imaging: I have reviewed all pertinent imaging results/findings within the past 24 hours.

## 2024-06-06 NOTE — PROGRESS NOTES
[facility] Medicine  Progress Note    Patient Name: Jacoby Jean-Baptiste  MRN: [id number]  Patient Class: IP- Inpatient   Admission Date: 6/3/2024  Length of Stay: 2 days  Attending Physician: Matteo Melendez DO  Primary Care Provider: Hunter Aguilar III, MD        Subjective:     Principal Problem:Ulcerative colitis        HPI:  This is a case of 49-year-old male with past medical history of insomnia, anxiety, depression known ulcerative colitis, hypertension.  Patient presented to the emergency department's with a complaint of constant pain of the abdomen with exacerbation of his colitis.  He said he gets medication once a month.  He did not have his medications list with him.  He has a gastroenterologist as an outpatient that he saw last month however he feels the pain is uncontrolled in his abdomen at this point.  Nothing made it better nothing made it worse.  He denied any nausea or vomiting.    In the emergency department's patient has white blood count of 8, he will hemoglobin 13, platelets 283, sed rate 46, CRP 6.9    Overview/Hospital Course:  Patient was admitted to the hospital medicine service for acute exacerbation of ulcerative colitis.  GI was consulted.  Patient had endoscopy and colonoscopy done.  Non-bleeding duodenal diverticulum noted during EGD and ancolitis ulcerative colitis noted on the colonoscopy which was unchanged compared to previous examinations. Biopsied. Patient was started on steroid and monitored.    Interval History:  Patient said he still feels pains whenever he eats and his pain is not better than yesterday    Review of Systems   Gastrointestinal:  Positive for abdominal pain. Negative for abdominal distention.     Objective:     Vital Signs (Most Recent):  Temp: 98.1 °F (36.7 °C) (06/06/24 1125)  Pulse: 87 (06/06/24 1125)  Resp: 18 (06/06/24 1209)  BP: 111/64 (06/06/24 1125)  SpO2: 98 % (06/06/24 1125) Vital Signs (24h Range):  Temp:  [97.4 °F (36.3 °C)-98.2  °F (36.8 °C)] 98.1 °F (36.7 °C)  Pulse:  [] 87  Resp:  [16-18] 18  SpO2:  [96 %-99 %] 98 %  BP: (107-138)/(52-84) 111/64     Weight: 119.7 kg (264 lb)  Body mass index is 43.93 kg/m².    Intake/Output Summary (Last 24 hours) at 6/6/2024 1455  Last data filed at 6/6/2024 0009  Gross per 24 hour   Intake 240 ml   Output --   Net 240 ml         Physical Exam  Cardiovascular:      Rate and Rhythm: Normal rate and regular rhythm.      Pulses: Normal pulses.      Heart sounds: Normal heart sounds.   Pulmonary:      Effort: No respiratory distress.   Abdominal:      General: There is no distension.      Palpations: There is no mass.      Tenderness: There is abdominal tenderness.             Significant Labs: All pertinent labs within the past 24 hours have been reviewed.    Significant Imaging: I have reviewed all pertinent imaging results/findings within the past 24 hours.    Assessment/Plan:      * Ulcerative colitis  Patient is status post colonoscopy and endoscopy on 06/05/2024.  Biopsy were taken.  Continue steroid and continue to monitor  GI is following patient      Depression  Chronic, continue home medication of Cymbalta        Hyperlipemia  Hold statin for now given the presentation of uncontrolled ulcerative colitis      History of supraventricular tachycardia  Patient is able to on metoprolol.  He is in sinus rhythm and regular rate.      Insomnia    Continue home medication of Ambien      VTE Risk Mitigation (From admission, onward)           Ordered     enoxaparin injection 40 mg  Every 12 hours         06/04/24 0628     IP VTE HIGH RISK PATIENT  Once         06/04/24 0623     Place sequential compression device  Until discontinued         06/04/24 0623                    Discharge Planning   GERARDO: 6/9/2024     Code Status: Full Code   Is the patient medically ready for discharge?:     Reason for patient still in hospital (select all that apply): Patient trending condition  Discharge Plan A: Home                   Matteo Melendez DO  Department of Hospital Medicine   [facility]

## 2024-06-06 NOTE — ASSESSMENT & PLAN NOTE
Patient is status post colonoscopy and endoscopy on 06/05/2024.  Biopsy were taken.  Continue steroid and continue to monitor  GI is following patient

## 2024-06-06 NOTE — PROGRESS NOTES
Patient Name: Jacoby Jean-Baptiste  Patient MRN: [id number]  Patient : 1975    Admit Date: 6/3/2024  Service date: 2024      PCP: Hunter Aguilar III, MD    S: diarrhea persists. Reports chronic throbbing abd discomfort.  Reports stools up to 15 x per day.      Inpatient Medications:   busPIRone  7.5 mg Oral TID    cholestyramine-aspartame  1 packet Oral BID    enoxparin  40 mg Subcutaneous Q12H (prophylaxis, 0900/2100)    hydrocortisone  100 mg Rectal QHS    lipase-protease-amylase 12,000-38,000-60,000 units  3 capsule Oral TID WM    methylPREDNISolone sodium succinate injection  60 mg Intravenous Daily    metoprolol succinate  50 mg Oral Daily    sertraline  100 mg Oral Daily    varenicline  1 mg Oral BID       Current Facility-Administered Medications:     acetaminophen, 650 mg, Oral, Q8H PRN    acetaminophen, 650 mg, Oral, Q4H PRN    dextrose 50%, 12.5 g, Intravenous, PRN    dextrose 50%, 25 g, Intravenous, PRN    dicyclomine, 10 mg, Oral, TID PRN    diphenoxylate-atropine 2.5-0.025 mg, 1 tablet, Oral, QID PRN    glucagon (human recombinant), 1 mg, Intramuscular, PRN    glucose, 16 g, Oral, PRN    glucose, 24 g, Oral, PRN    HYDROcodone-acetaminophen, 1 tablet, Oral, Q6H PRN    LORazepam, 0.5 mg, Oral, Q12H PRN    magnesium oxide, 800 mg, Oral, PRN    magnesium oxide, 800 mg, Oral, PRN    naloxone, 0.02 mg, Intravenous, PRN    potassium bicarbonate, 35 mEq, Oral, PRN    potassium bicarbonate, 50 mEq, Oral, PRN    potassium bicarbonate, 60 mEq, Oral, PRN    potassium, sodium phosphates, 2 packet, Oral, PRN    potassium, sodium phosphates, 2 packet, Oral, PRN    potassium, sodium phosphates, 2 packet, Oral, PRN    promethazine, 25 mg, Oral, QID PRN    sodium chloride 0.9%, 2 mL, Intravenous, Q12H PRN    zolpidem, 10 mg, Oral, Nightly PRN    Review of Systems:  A 10 point review of systems was performed and was normal, except as mentioned in the HPI, including constitutional, HEENT, heme, lymph,  "cardiovascular, respiratory, gastrointestinal, genitourinary, neurologic, endocrine, psychiatric and musculoskeletal.      OBJECTIVE:    Physical Exam:  24 Hour Vital Sign Ranges: Temp:  [96.9 °F (36.1 °C)-98.2 °F (36.8 °C)] 98.1 °F (36.7 °C)  Pulse:  [] 87  Resp:  [0-18] 18  SpO2:  [93 %-100 %] 98 %  BP: (101-138)/(52-84) 111/64  Most recent vitals: /64 (BP Location: Left arm, Patient Position: Lying)   Pulse 87   Temp 98.1 °F (36.7 °C) (Oral)   Resp 18   Ht 5' 5" (1.651 m)   Wt 119.7 kg (264 lb)   SpO2 98%   BMI 43.93 kg/m²    GEN: well-developed, well-nourished, awake and alert, non-toxic appearing adult  HEENT: PERRL, sclera anicteric, oral mucosa pink and moist without lesion  NECK: trachea midline; Good ROM  CV: regular rate and rhythm, no murmurs or gallops  RESP: clear to auscultation bilaterally, no wheezes, rhonci or rales  ABD: soft, non-tender, non-distended, normal bowel sounds  EXT: no swelling or edema, 2+ pulses distally  SKIN: no rashes or jaundice  PSYCH: normal affect    Labs:   Recent Labs     06/03/24 2205 06/05/24 0513 06/06/24  0451   WBC 8.72 7.19 5.88   * 97 100*    248 201     Recent Labs     06/03/24 2205 06/05/24 0513 06/06/24  0451   * 135* 137   K 3.7 3.6 3.5    103 106   CO2 20* 25 26   BUN 9 8 8   GLU 95 67* 79     No results for input(s): "ALB" in the last 72 hours.    Invalid input(s): "ALKP", "SGOT", "SGPT", "TBIL", "DBIL", "TPRO"  No results for input(s): "PT", "INR", "PTT" in the last 72 hours.      Radiology Review:  CT Abdomen Pelvis With IV Contrast NO Oral Contrast   Final Result      Nonspecific proctocolitis involving the rectum and left colon, as above, similar compared to prior.      Bilateral nonobstructing renal calculi, similar compared to prior.         Electronically signed by: Russ Medina MD   Date:    06/04/2024   Time:    02:28            IMPRESSION / RECOMMENDATIONS:  Ulcerative colitis  -colonoscopy results " reviewed. Yesterdays exam looks much better upon review of the photographs compared to Dr Ramos colonoscopy.  The patient is now on omvoh and expect his clinical course to continue to improve but stools are still very frequent.  Awaiting pathology.  -continue to monitor  -must count stools daily       Enoc Kebede  6/6/2024  1:09 PM

## 2024-06-06 NOTE — PLAN OF CARE
Problem: Adult Inpatient Plan of Care  Goal: Plan of Care Review  Outcome: Progressing  Goal: Patient-Specific Goal (Individualized)  Outcome: Progressing  Goal: Absence of Hospital-Acquired Illness or Injury  Outcome: Progressing  Goal: Optimal Comfort and Wellbeing  Outcome: Progressing  Goal: Readiness for Transition of Care  Outcome: Progressing     Problem: Bariatric Environmental Safety  Goal: Safety Maintained with Care  Outcome: Progressing     Problem: Diabetes Comorbidity  Goal: Blood Glucose Level Within Targeted Range  Outcome: Progressing     Problem: Wound  Goal: Optimal Coping  Outcome: Progressing  Goal: Optimal Functional Ability  Outcome: Progressing  Goal: Absence of Infection Signs and Symptoms  Outcome: Progressing  Goal: Improved Oral Intake  Outcome: Progressing  Goal: Optimal Pain Control and Function  Outcome: Progressing  Goal: Skin Health and Integrity  Outcome: Progressing  Goal: Optimal Wound Healing  Outcome: Progressing     Problem: Fall Injury Risk  Goal: Absence of Fall and Fall-Related Injury  Outcome: Progressing

## 2024-06-07 LAB
ALBUMIN SERPL BCP-MCNC: 3 G/DL (ref 3.5–5.2)
ALP SERPL-CCNC: 48 U/L (ref 55–135)
ALT SERPL W/O P-5'-P-CCNC: 10 U/L (ref 10–44)
ANION GAP SERPL CALC-SCNC: 6 MMOL/L (ref 8–16)
AST SERPL-CCNC: 6 U/L (ref 10–40)
BASOPHILS # BLD AUTO: 0.02 K/UL (ref 0–0.2)
BASOPHILS NFR BLD: 0.4 % (ref 0–1.9)
BILIRUB SERPL-MCNC: 0.2 MG/DL (ref 0.1–1)
BUN SERPL-MCNC: 12 MG/DL (ref 6–20)
CALCIUM SERPL-MCNC: 8.2 MG/DL (ref 8.7–10.5)
CHLORIDE SERPL-SCNC: 104 MMOL/L (ref 95–110)
CO2 SERPL-SCNC: 27 MMOL/L (ref 23–29)
CREAT SERPL-MCNC: 1.1 MG/DL (ref 0.5–1.4)
DIFFERENTIAL METHOD BLD: ABNORMAL
EOSINOPHIL # BLD AUTO: 0.1 K/UL (ref 0–0.5)
EOSINOPHIL NFR BLD: 2 % (ref 0–8)
ERYTHROCYTE [DISTWIDTH] IN BLOOD BY AUTOMATED COUNT: 15.8 % (ref 11.5–14.5)
EST. GFR  (NO RACE VARIABLE): >60 ML/MIN/1.73 M^2
GLUCOSE SERPL-MCNC: 86 MG/DL (ref 70–110)
HCT VFR BLD AUTO: 36.9 % (ref 40–54)
HGB BLD-MCNC: 11.6 G/DL (ref 14–18)
IMM GRANULOCYTES # BLD AUTO: 0.02 K/UL (ref 0–0.04)
IMM GRANULOCYTES NFR BLD AUTO: 0.4 % (ref 0–0.5)
LYMPHOCYTES # BLD AUTO: 1.1 K/UL (ref 1–4.8)
LYMPHOCYTES NFR BLD: 21.7 % (ref 18–48)
MAGNESIUM SERPL-MCNC: 1.7 MG/DL (ref 1.6–2.6)
MCH RBC QN AUTO: 30.8 PG (ref 27–31)
MCHC RBC AUTO-ENTMCNC: 31.4 G/DL (ref 32–36)
MCV RBC AUTO: 98 FL (ref 82–98)
MONOCYTES # BLD AUTO: 0.7 K/UL (ref 0.3–1)
MONOCYTES NFR BLD: 15 % (ref 4–15)
NEUTROPHILS # BLD AUTO: 3 K/UL (ref 1.8–7.7)
NEUTROPHILS NFR BLD: 60.5 % (ref 38–73)
NRBC BLD-RTO: 0 /100 WBC
PLATELET # BLD AUTO: 246 K/UL (ref 150–450)
PMV BLD AUTO: 9.7 FL (ref 9.2–12.9)
POTASSIUM SERPL-SCNC: 3.8 MMOL/L (ref 3.5–5.1)
PROT SERPL-MCNC: 5.7 G/DL (ref 6–8.4)
RBC # BLD AUTO: 3.77 M/UL (ref 4.6–6.2)
SODIUM SERPL-SCNC: 137 MMOL/L (ref 136–145)
WBC # BLD AUTO: 4.94 K/UL (ref 3.9–12.7)

## 2024-06-07 PROCEDURE — 21400001 HC TELEMETRY ROOM

## 2024-06-07 PROCEDURE — 63600175 PHARM REV CODE 636 W HCPCS: Performed by: INTERNAL MEDICINE

## 2024-06-07 PROCEDURE — 85025 COMPLETE CBC W/AUTO DIFF WBC: CPT | Performed by: INTERNAL MEDICINE

## 2024-06-07 PROCEDURE — 83735 ASSAY OF MAGNESIUM: CPT | Performed by: INTERNAL MEDICINE

## 2024-06-07 PROCEDURE — 25000003 PHARM REV CODE 250: Performed by: INTERNAL MEDICINE

## 2024-06-07 PROCEDURE — 36415 COLL VENOUS BLD VENIPUNCTURE: CPT | Performed by: INTERNAL MEDICINE

## 2024-06-07 PROCEDURE — 25000003 PHARM REV CODE 250: Performed by: FAMILY MEDICINE

## 2024-06-07 PROCEDURE — 80053 COMPREHEN METABOLIC PANEL: CPT | Performed by: INTERNAL MEDICINE

## 2024-06-07 RX ORDER — LANOLIN ALCOHOL/MO/W.PET/CERES
800 CREAM (GRAM) TOPICAL ONCE
Status: COMPLETED | OUTPATIENT
Start: 2024-06-07 | End: 2024-06-07

## 2024-06-07 RX ORDER — HYDROCODONE BITARTRATE AND ACETAMINOPHEN 5; 325 MG/1; MG/1
2 TABLET ORAL EVERY 6 HOURS PRN
Status: DISCONTINUED | OUTPATIENT
Start: 2024-06-07 | End: 2024-06-08 | Stop reason: HOSPADM

## 2024-06-07 RX ADMIN — HYDROCORTISONE 100 MG: 100 ENEMA RECTAL at 08:06

## 2024-06-07 RX ADMIN — CHOLESTYRAMINE LIGHT 4 G: 4 POWDER, FOR SUSPENSION ORAL at 08:06

## 2024-06-07 RX ADMIN — PANCRELIPASE 3 CAPSULE: 60000; 12000; 38000 CAPSULE, DELAYED RELEASE PELLETS ORAL at 11:06

## 2024-06-07 RX ADMIN — VARENICLINE TARTRATE 1 MG: 0.5 TABLET, FILM COATED ORAL at 08:06

## 2024-06-07 RX ADMIN — PANCRELIPASE 3 CAPSULE: 60000; 12000; 38000 CAPSULE, DELAYED RELEASE PELLETS ORAL at 07:06

## 2024-06-07 RX ADMIN — Medication 800 MG: at 05:06

## 2024-06-07 RX ADMIN — HYDROCODONE BITARTRATE AND ACETAMINOPHEN 2 TABLET: 5; 325 TABLET ORAL at 09:06

## 2024-06-07 RX ADMIN — ENOXAPARIN SODIUM 40 MG: 40 INJECTION SUBCUTANEOUS at 08:06

## 2024-06-07 RX ADMIN — BUSPIRONE HYDROCHLORIDE 7.5 MG: 5 TABLET ORAL at 03:06

## 2024-06-07 RX ADMIN — HYDROCODONE BITARTRATE AND ACETAMINOPHEN 1 TABLET: 5; 325 TABLET ORAL at 10:06

## 2024-06-07 RX ADMIN — HYDROCODONE BITARTRATE AND ACETAMINOPHEN 2 TABLET: 5; 325 TABLET ORAL at 03:06

## 2024-06-07 RX ADMIN — METHYLPREDNISOLONE SODIUM SUCCINATE 60 MG: 40 INJECTION, POWDER, FOR SOLUTION INTRAMUSCULAR; INTRAVENOUS at 08:06

## 2024-06-07 RX ADMIN — Medication 800 MG: at 10:06

## 2024-06-07 RX ADMIN — PROMETHAZINE HYDROCHLORIDE 25 MG: 25 TABLET ORAL at 02:06

## 2024-06-07 RX ADMIN — HYDROCODONE BITARTRATE AND ACETAMINOPHEN 1 TABLET: 5; 325 TABLET ORAL at 02:06

## 2024-06-07 RX ADMIN — METOPROLOL SUCCINATE 50 MG: 50 TABLET, EXTENDED RELEASE ORAL at 08:06

## 2024-06-07 RX ADMIN — BUSPIRONE HYDROCHLORIDE 7.5 MG: 5 TABLET ORAL at 08:06

## 2024-06-07 RX ADMIN — PROMETHAZINE HYDROCHLORIDE 25 MG: 25 TABLET ORAL at 09:06

## 2024-06-07 RX ADMIN — POTASSIUM BICARBONATE 50 MEQ: 977.5 TABLET, EFFERVESCENT ORAL at 10:06

## 2024-06-07 RX ADMIN — SERTRALINE HYDROCHLORIDE 100 MG: 50 TABLET ORAL at 08:06

## 2024-06-07 RX ADMIN — PANCRELIPASE 3 CAPSULE: 60000; 12000; 38000 CAPSULE, DELAYED RELEASE PELLETS ORAL at 04:06

## 2024-06-07 RX ADMIN — ZOLPIDEM TARTRATE 10 MG: 5 TABLET, COATED ORAL at 09:06

## 2024-06-07 NOTE — PROGRESS NOTES
[facility] Medicine  Progress Note    Patient Name: Jacoby Jean-Baptiste  MRN: [id number]  Patient Class: IP- Inpatient   Admission Date: 6/3/2024  Length of Stay: 3 days  Attending Physician: Matteo Melendez DO  Primary Care Provider: Hunter Aguilar III, MD        Subjective:     Principal Problem:Ulcerative colitis        HPI:  This is a case of 49-year-old male with past medical history of insomnia, anxiety, depression known ulcerative colitis, hypertension.  Patient presented to the emergency department's with a complaint of constant pain of the abdomen with exacerbation of his colitis.  He said he gets medication once a month.  He did not have his medications list with him.  He has a gastroenterologist as an outpatient that he saw last month however he feels the pain is uncontrolled in his abdomen at this point.  Nothing made it better nothing made it worse.  He denied any nausea or vomiting.    In the emergency department's patient has white blood count of 8, he will hemoglobin 13, platelets 283, sed rate 46, CRP 6.9    Overview/Hospital Course:  Patient was admitted to the hospital medicine service for acute exacerbation of ulcerative colitis.  GI was consulted.  Patient had endoscopy and colonoscopy done.  Non-bleeding duodenal diverticulum noted during EGD and ancolitis ulcerative colitis noted on the colonoscopy which was unchanged compared to previous examinations. Biopsied. Patient was started on steroid and monitored.    Interval History:  Patient said he felt same as yesterday he is still feeling discomfort    Review of Systems   Gastrointestinal:  Positive for abdominal pain.     Objective:     Vital Signs (Most Recent):  Temp: 98 °F (36.7 °C) (06/07/24 1141)  Pulse: 62 (06/07/24 1141)  Resp: 16 (06/07/24 1141)  BP: (!) 140/72 (06/07/24 1141)  SpO2: 100 % (06/07/24 1141) Vital Signs (24h Range):  Temp:  [97.5 °F (36.4 °C)-98.1 °F (36.7 °C)] 98 °F (36.7 °C)  Pulse:  [62-80]  62  Resp:  [16-18] 16  SpO2:  [97 %-100 %] 100 %  BP: (118-140)/(58-77) 140/72     Weight: 119.7 kg (264 lb)  Body mass index is 43.93 kg/m².    Intake/Output Summary (Last 24 hours) at 6/7/2024 1538  Last data filed at 6/7/2024 0941  Gross per 24 hour   Intake 4360 ml   Output --   Net 4360 ml         Physical Exam  Cardiovascular:      Rate and Rhythm: Normal rate and regular rhythm.   Pulmonary:      Effort: Pulmonary effort is normal.      Breath sounds: Normal breath sounds.   Abdominal:      General: There is no distension.      Tenderness: There is abdominal tenderness.   Neurological:      Mental Status: He is oriented to person, place, and time.             Significant Labs: All pertinent labs within the past 24 hours have been reviewed.    Significant Imaging: I have reviewed all pertinent imaging results/findings within the past 24 hours.    Assessment/Plan:      * Ulcerative colitis  Patient is status post colonoscopy and endoscopy on 06/05/2024.  Biopsy were taken.  Continue steroid and continue to monitor  GI is following patient  Patient is still requiring adjustment on pain meds for his abdominal pain      Depression  Chronic, continue home medication of Cymbalta        Hyperlipemia  Hold statin for now given the presentation of uncontrolled ulcerative colitis      History of supraventricular tachycardia  Patient is able to on metoprolol.  He is in sinus rhythm and regular rate.      Insomnia    Continue home medication of Ambien      VTE Risk Mitigation (From admission, onward)           Ordered     enoxaparin injection 40 mg  Every 12 hours         06/04/24 0628     IP VTE HIGH RISK PATIENT  Once         06/04/24 0623     Place sequential compression device  Until discontinued         06/04/24 0623                    Discharge Planning   GERARDO: 6/9/2024     Code Status: Full Code   Is the patient medically ready for discharge?:     Reason for patient still in hospital (select all that apply): Patient  trending condition  Discharge Plan A: Home                  Matteo Melendez DO  Department of Hospital Medicine   [facility]

## 2024-06-07 NOTE — ASSESSMENT & PLAN NOTE
Patient is status post colonoscopy and endoscopy on 06/05/2024.  Biopsy were taken.  Continue steroid and continue to monitor  GI is following patient  Patient is still requiring adjustment on pain meds for his abdominal pain

## 2024-06-07 NOTE — PROGRESS NOTES
Patient Name: Jacoby Jean-Baptiste  Patient MRN: [id number]  Patient : 1975    Admit Date: 6/3/2024  Service date: 2024    Reason for Consult: colitis    PCP: Hunter Aguilar III, MD    S:  Slightly better but still with marked bowel movements a day he has already had 6 today..     Inpatient Medications:   busPIRone  7.5 mg Oral TID    cholestyramine-aspartame  1 packet Oral BID    enoxparin  40 mg Subcutaneous Q12H (prophylaxis, 0900/)    hydrocortisone  100 mg Rectal QHS    lipase-protease-amylase 12,000-38,000-60,000 units  3 capsule Oral TID WM    methylPREDNISolone sodium succinate injection  60 mg Intravenous Daily    metoprolol succinate  50 mg Oral Daily    sertraline  100 mg Oral Daily    varenicline  1 mg Oral BID       Current Facility-Administered Medications:     acetaminophen, 650 mg, Oral, Q8H PRN    acetaminophen, 650 mg, Oral, Q4H PRN    dextrose 50%, 12.5 g, Intravenous, PRN    dextrose 50%, 25 g, Intravenous, PRN    dicyclomine, 10 mg, Oral, TID PRN    diphenoxylate-atropine 2.5-0.025 mg, 1 tablet, Oral, QID PRN    glucagon (human recombinant), 1 mg, Intramuscular, PRN    glucose, 16 g, Oral, PRN    glucose, 24 g, Oral, PRN    HYDROcodone-acetaminophen, 2 tablet, Oral, Q6H PRN    LORazepam, 0.5 mg, Oral, Q12H PRN    magnesium oxide, 800 mg, Oral, PRN    magnesium oxide, 800 mg, Oral, PRN    naloxone, 0.02 mg, Intravenous, PRN    potassium bicarbonate, 35 mEq, Oral, PRN    potassium bicarbonate, 50 mEq, Oral, PRN    potassium bicarbonate, 60 mEq, Oral, PRN    potassium, sodium phosphates, 2 packet, Oral, PRN    potassium, sodium phosphates, 2 packet, Oral, PRN    potassium, sodium phosphates, 2 packet, Oral, PRN    promethazine, 25 mg, Oral, QID PRN    sodium chloride 0.9%, 2 mL, Intravenous, Q12H PRN    zolpidem, 10 mg, Oral, Nightly PRN    Review of Systems:  A 10 point review of systems was performed and was normal, except as mentioned in the HPI, including constitutional, HEENT,  "heme, lymph, cardiovascular, respiratory, gastrointestinal, genitourinary, neurologic, endocrine, psychiatric and musculoskeletal.      OBJECTIVE:    Physical Exam:  24 Hour Vital Sign Ranges: Temp:  [97.5 °F (36.4 °C)-98.1 °F (36.7 °C)] 98 °F (36.7 °C)  Pulse:  [62-80] 62  Resp:  [16-18] 16  SpO2:  [97 %-100 %] 100 %  BP: (118-140)/(58-77) 140/72  Most recent vitals: BP (!) 140/72 (BP Location: Left arm, Patient Position: Lying)   Pulse 62   Temp 98 °F (36.7 °C) (Oral)   Resp 16   Ht 5' 5" (1.651 m)   Wt 119.7 kg (264 lb)   SpO2 100%   BMI 43.93 kg/m²    GEN: well-developed, well-nourished, awake and alert, non-toxic appearing adult  HEENT: PERRL, sclera anicteric, oral mucosa pink and moist without lesion  NECK: trachea midline; Good ROM  CV: regular rate and rhythm, no murmurs or gallops  RESP: clear to auscultation bilaterally, no wheezes, rhonci or rales  ABD: soft, non-tender, non-distended, normal bowel sounds  EXT: no swelling or edema, 2+ pulses distally  SKIN: no rashes or jaundice  PSYCH: normal affect    Labs:   Recent Labs     06/05/24  0513 06/06/24  0451 06/07/24  0238   WBC 7.19 5.88 4.94   MCV 97 100* 98    201 246     Recent Labs     06/05/24  0513 06/06/24  0451 06/07/24  0238   * 137 137   K 3.6 3.5 3.8    106 104   CO2 25 26 27   BUN 8 8 12   GLU 67* 79 86     No results for input(s): "ALB" in the last 72 hours.    Invalid input(s): "ALKP", "SGOT", "SGPT", "TBIL", "DBIL", "TPRO"  No results for input(s): "PT", "INR", "PTT" in the last 72 hours.      Radiology Review:  CT Abdomen Pelvis With IV Contrast NO Oral Contrast   Final Result      Nonspecific proctocolitis involving the rectum and left colon, as above, similar compared to prior.      Bilateral nonobstructing renal calculi, similar compared to prior.         Electronically signed by: Russ Medina MD   Date:    06/04/2024   Time:    02:28            IMPRESSION / RECOMMENDATIONS:  Severe ulcerative colitis.  Has " been gone on prednisone 40 mg a day.  May need some pain medicine to if he wants to go home.  We will leave him on prednisone 40 a day until he follows up with Dr. Jean.  He is on IV infusions as well.      Russ Rowe  6/7/2024  3:44 PM

## 2024-06-07 NOTE — SUBJECTIVE & OBJECTIVE
Interval History:  Patient said he felt same as yesterday he is still feeling discomfort    Review of Systems   Gastrointestinal:  Positive for abdominal pain.     Objective:     Vital Signs (Most Recent):  Temp: 98 °F (36.7 °C) (06/07/24 1141)  Pulse: 62 (06/07/24 1141)  Resp: 16 (06/07/24 1141)  BP: (!) 140/72 (06/07/24 1141)  SpO2: 100 % (06/07/24 1141) Vital Signs (24h Range):  Temp:  [97.5 °F (36.4 °C)-98.1 °F (36.7 °C)] 98 °F (36.7 °C)  Pulse:  [62-80] 62  Resp:  [16-18] 16  SpO2:  [97 %-100 %] 100 %  BP: (118-140)/(58-77) 140/72     Weight: 119.7 kg (264 lb)  Body mass index is 43.93 kg/m².    Intake/Output Summary (Last 24 hours) at 6/7/2024 1538  Last data filed at 6/7/2024 0941  Gross per 24 hour   Intake 4360 ml   Output --   Net 4360 ml         Physical Exam  Cardiovascular:      Rate and Rhythm: Normal rate and regular rhythm.   Pulmonary:      Effort: Pulmonary effort is normal.      Breath sounds: Normal breath sounds.   Abdominal:      General: There is no distension.      Tenderness: There is abdominal tenderness.   Neurological:      Mental Status: He is oriented to person, place, and time.             Significant Labs: All pertinent labs within the past 24 hours have been reviewed.    Significant Imaging: I have reviewed all pertinent imaging results/findings within the past 24 hours.

## 2024-06-07 NOTE — NURSING
Pt. seen for Wound F/U.    No dressing to wound and reports he took it off to shower.  Wound looks much better since applying Xeroform.  Reminded him of the importance to keep wound covered at all times to decrease risk of infection,.      Cleansed Abdominal Ulcer with NS and gauze. Patted dry with gauze. Applied double layered Xeroform gauze to opening. Covered with dry gauze and secured with tape. Jose. well.

## 2024-06-08 VITALS
OXYGEN SATURATION: 98 % | TEMPERATURE: 99 F | WEIGHT: 264 LBS | RESPIRATION RATE: 16 BRPM | SYSTOLIC BLOOD PRESSURE: 120 MMHG | DIASTOLIC BLOOD PRESSURE: 62 MMHG | BODY MASS INDEX: 43.99 KG/M2 | HEIGHT: 65 IN | HEART RATE: 75 BPM

## 2024-06-08 LAB
ALBUMIN SERPL BCP-MCNC: 2.8 G/DL (ref 3.5–5.2)
ALP SERPL-CCNC: 44 U/L (ref 55–135)
ALT SERPL W/O P-5'-P-CCNC: 11 U/L (ref 10–44)
ANION GAP SERPL CALC-SCNC: 7 MMOL/L (ref 8–16)
AST SERPL-CCNC: 6 U/L (ref 10–40)
BASOPHILS # BLD AUTO: 0.02 K/UL (ref 0–0.2)
BASOPHILS NFR BLD: 0.4 % (ref 0–1.9)
BILIRUB SERPL-MCNC: 0.2 MG/DL (ref 0.1–1)
BUN SERPL-MCNC: 12 MG/DL (ref 6–20)
CALCIUM SERPL-MCNC: 8.3 MG/DL (ref 8.7–10.5)
CHLORIDE SERPL-SCNC: 103 MMOL/L (ref 95–110)
CO2 SERPL-SCNC: 27 MMOL/L (ref 23–29)
CREAT SERPL-MCNC: 1.1 MG/DL (ref 0.5–1.4)
DIFFERENTIAL METHOD BLD: ABNORMAL
EOSINOPHIL # BLD AUTO: 0.2 K/UL (ref 0–0.5)
EOSINOPHIL NFR BLD: 2.8 % (ref 0–8)
ERYTHROCYTE [DISTWIDTH] IN BLOOD BY AUTOMATED COUNT: 15.7 % (ref 11.5–14.5)
EST. GFR  (NO RACE VARIABLE): >60 ML/MIN/1.73 M^2
GLUCOSE SERPL-MCNC: 110 MG/DL (ref 70–110)
HCT VFR BLD AUTO: 35 % (ref 40–54)
HGB BLD-MCNC: 11.2 G/DL (ref 14–18)
IMM GRANULOCYTES # BLD AUTO: 0.05 K/UL (ref 0–0.04)
IMM GRANULOCYTES NFR BLD AUTO: 0.9 % (ref 0–0.5)
LYMPHOCYTES # BLD AUTO: 1.5 K/UL (ref 1–4.8)
LYMPHOCYTES NFR BLD: 27 % (ref 18–48)
MAGNESIUM SERPL-MCNC: 1.6 MG/DL (ref 1.6–2.6)
MCH RBC QN AUTO: 31.1 PG (ref 27–31)
MCHC RBC AUTO-ENTMCNC: 32 G/DL (ref 32–36)
MCV RBC AUTO: 97 FL (ref 82–98)
MONOCYTES # BLD AUTO: 0.7 K/UL (ref 0.3–1)
MONOCYTES NFR BLD: 12.3 % (ref 4–15)
NEUTROPHILS # BLD AUTO: 3.1 K/UL (ref 1.8–7.7)
NEUTROPHILS NFR BLD: 56.6 % (ref 38–73)
NRBC BLD-RTO: 0 /100 WBC
PLATELET # BLD AUTO: 247 K/UL (ref 150–450)
PMV BLD AUTO: 8.9 FL (ref 9.2–12.9)
POTASSIUM SERPL-SCNC: 3.7 MMOL/L (ref 3.5–5.1)
PROT SERPL-MCNC: 5.5 G/DL (ref 6–8.4)
RBC # BLD AUTO: 3.6 M/UL (ref 4.6–6.2)
SODIUM SERPL-SCNC: 137 MMOL/L (ref 136–145)
WBC # BLD AUTO: 5.45 K/UL (ref 3.9–12.7)

## 2024-06-08 PROCEDURE — 80053 COMPREHEN METABOLIC PANEL: CPT | Performed by: INTERNAL MEDICINE

## 2024-06-08 PROCEDURE — 85025 COMPLETE CBC W/AUTO DIFF WBC: CPT | Performed by: INTERNAL MEDICINE

## 2024-06-08 PROCEDURE — 83735 ASSAY OF MAGNESIUM: CPT | Performed by: INTERNAL MEDICINE

## 2024-06-08 PROCEDURE — 25000003 PHARM REV CODE 250: Performed by: INTERNAL MEDICINE

## 2024-06-08 PROCEDURE — 63600175 PHARM REV CODE 636 W HCPCS: Performed by: INTERNAL MEDICINE

## 2024-06-08 PROCEDURE — 36415 COLL VENOUS BLD VENIPUNCTURE: CPT | Performed by: INTERNAL MEDICINE

## 2024-06-08 PROCEDURE — 25000003 PHARM REV CODE 250: Performed by: FAMILY MEDICINE

## 2024-06-08 RX ORDER — HYDROCODONE BITARTRATE AND ACETAMINOPHEN 5; 325 MG/1; MG/1
2 TABLET ORAL EVERY 6 HOURS PRN
Qty: 30 TABLET | Refills: 0 | Status: SHIPPED | OUTPATIENT
Start: 2024-06-08

## 2024-06-08 RX ORDER — HYDROCORTISONE 100 MG/60ML
100 SUSPENSION RECTAL NIGHTLY
Qty: 18 ENEMA | Refills: 0 | Status: SHIPPED | OUTPATIENT
Start: 2024-06-08 | End: 2024-06-26

## 2024-06-08 RX ORDER — METHYLPREDNISOLONE 8 MG/1
32 TABLET ORAL DAILY
Qty: 120 TABLET | Refills: 0 | Status: SHIPPED | OUTPATIENT
Start: 2024-06-08 | End: 2024-07-08

## 2024-06-08 RX ADMIN — PANCRELIPASE 3 CAPSULE: 60000; 12000; 38000 CAPSULE, DELAYED RELEASE PELLETS ORAL at 07:06

## 2024-06-08 RX ADMIN — METOPROLOL SUCCINATE 50 MG: 50 TABLET, EXTENDED RELEASE ORAL at 07:06

## 2024-06-08 RX ADMIN — METHYLPREDNISOLONE SODIUM SUCCINATE 60 MG: 40 INJECTION, POWDER, FOR SOLUTION INTRAMUSCULAR; INTRAVENOUS at 07:06

## 2024-06-08 RX ADMIN — VARENICLINE TARTRATE 1 MG: 0.5 TABLET, FILM COATED ORAL at 07:06

## 2024-06-08 RX ADMIN — HYDROCODONE BITARTRATE AND ACETAMINOPHEN 2 TABLET: 5; 325 TABLET ORAL at 07:06

## 2024-06-08 RX ADMIN — ENOXAPARIN SODIUM 40 MG: 40 INJECTION SUBCUTANEOUS at 07:06

## 2024-06-08 RX ADMIN — BUSPIRONE HYDROCHLORIDE 7.5 MG: 5 TABLET ORAL at 02:06

## 2024-06-08 RX ADMIN — PROMETHAZINE HYDROCHLORIDE 25 MG: 25 TABLET ORAL at 08:06

## 2024-06-08 RX ADMIN — Medication 800 MG: at 10:06

## 2024-06-08 RX ADMIN — BUSPIRONE HYDROCHLORIDE 7.5 MG: 5 TABLET ORAL at 07:06

## 2024-06-08 RX ADMIN — CHOLESTYRAMINE LIGHT 4 G: 4 POWDER, FOR SUSPENSION ORAL at 07:06

## 2024-06-08 RX ADMIN — DIPHENOXYLATE HYDROCHLORIDE AND ATROPINE SULFATE 1 TABLET: 2.5; .025 TABLET ORAL at 07:06

## 2024-06-08 RX ADMIN — Medication 800 MG: at 05:06

## 2024-06-08 RX ADMIN — POTASSIUM BICARBONATE 50 MEQ: 977.5 TABLET, EFFERVESCENT ORAL at 10:06

## 2024-06-08 RX ADMIN — PANCRELIPASE 3 CAPSULE: 60000; 12000; 38000 CAPSULE, DELAYED RELEASE PELLETS ORAL at 11:06

## 2024-06-08 RX ADMIN — SERTRALINE HYDROCHLORIDE 100 MG: 50 TABLET ORAL at 07:06

## 2024-06-08 RX ADMIN — HYDROCODONE BITARTRATE AND ACETAMINOPHEN 2 TABLET: 5; 325 TABLET ORAL at 02:06

## 2024-06-08 NOTE — PLAN OF CARE
Discharge orders and chart reviewed with no further post-acute discharge needs identified at this time.   At this time, patient is cleared for discharge from Case Management standpoint.         06/08/24 1530   Final Note   Assessment Type Final Discharge Note   Anticipated Discharge Disposition Home   What phone number can be called within the next 1-3 days to see how you are doing after discharge? [phone]   Post-Acute Status   Coverage [employer] [facility] [employer] [facility]   Discharge Delays None known at this time

## 2024-06-11 ENCOUNTER — PATIENT OUTREACH (OUTPATIENT)
Dept: ADMINISTRATIVE | Facility: CLINIC | Age: 49
End: 2024-06-11
Payer: COMMERCIAL

## 2024-06-11 NOTE — PROGRESS NOTES
2nd Attempt made to reach patient for TCC call. Left voicemail please call [phone] leave first name, last name, and  Cely will return your call.

## 2024-06-11 NOTE — PROGRESS NOTES
C3 nurse attempted to contact Jacoby Jean-Baptiste for a TCC post hospital discharge follow up call. No answer. Left voicemail with callback information. The patient has a scheduled HOSFU appointment with Hunter Aguilar III, MD on 06/14/24 @ 1500.

## 2024-06-12 NOTE — PROGRESS NOTES
3rd Attempt made to reach patient for TCC call. Left voicemail please call [phone] leave first name, last name, and  Cely will return your call.

## 2024-06-13 LAB — CALPROTECTIN STL-MCNT: 2480 UG/G (ref 0–120)

## 2024-06-14 LAB
LABCORP MISC TEST CODE: 8201
LABCORP MISC TEST NAME: NORMAL
LABCORP MISCELLANEOUS TEST: NORMAL

## 2024-06-17 LAB
LABCORP MISC TEST CODE: 8201
LABCORP MISC TEST NAME: NORMAL
LABCORP MISCELLANEOUS TEST: NORMAL

## 2024-07-05 ENCOUNTER — OFFICE VISIT (OUTPATIENT)
Dept: FAMILY MEDICINE | Facility: CLINIC | Age: 49
End: 2024-07-05
Payer: COMMERCIAL

## 2024-07-05 VITALS
HEART RATE: 85 BPM | SYSTOLIC BLOOD PRESSURE: 134 MMHG | DIASTOLIC BLOOD PRESSURE: 74 MMHG | WEIGHT: 280 LBS | HEIGHT: 65 IN | TEMPERATURE: 98 F | BODY MASS INDEX: 46.65 KG/M2 | OXYGEN SATURATION: 96 %

## 2024-07-05 DIAGNOSIS — E11.9 TYPE 2 DIABETES MELLITUS WITHOUT COMPLICATION, WITHOUT LONG-TERM CURRENT USE OF INSULIN: Primary | ICD-10-CM

## 2024-07-05 DIAGNOSIS — E53.8 VITAMIN B12 DEFICIENCY: ICD-10-CM

## 2024-07-05 DIAGNOSIS — F41.9 ANXIETY: ICD-10-CM

## 2024-07-05 DIAGNOSIS — G47.00 INSOMNIA, UNSPECIFIED TYPE: ICD-10-CM

## 2024-07-05 DIAGNOSIS — R21 RASH: ICD-10-CM

## 2024-07-05 DIAGNOSIS — Z86.79 HISTORY OF SUPRAVENTRICULAR TACHYCARDIA: ICD-10-CM

## 2024-07-05 DIAGNOSIS — F32.A DEPRESSION, UNSPECIFIED DEPRESSION TYPE: ICD-10-CM

## 2024-07-05 DIAGNOSIS — E78.00 PURE HYPERCHOLESTEROLEMIA: ICD-10-CM

## 2024-07-05 DIAGNOSIS — K51.911 ULCERATIVE COLITIS WITH RECTAL BLEEDING, UNSPECIFIED LOCATION: Chronic | ICD-10-CM

## 2024-07-05 PROCEDURE — 99999 PR PBB SHADOW E&M-EST. PATIENT-LVL V: CPT | Mod: PBBFAC,,,

## 2024-07-05 RX ORDER — LORAZEPAM 0.5 MG/1
0.5 TABLET ORAL EVERY 12 HOURS PRN
Qty: 60 TABLET | Refills: 2 | Status: SHIPPED | OUTPATIENT
Start: 2024-07-05

## 2024-07-05 RX ORDER — SEMAGLUTIDE 1.34 MG/ML
1 INJECTION, SOLUTION SUBCUTANEOUS
Qty: 3 EACH | Refills: 1 | Status: CANCELLED | OUTPATIENT
Start: 2024-07-05

## 2024-07-05 RX ORDER — SIMVASTATIN 20 MG/1
20 TABLET, FILM COATED ORAL NIGHTLY
Qty: 90 TABLET | Refills: 1 | Status: SHIPPED | OUTPATIENT
Start: 2024-07-05

## 2024-07-05 RX ORDER — METFORMIN HYDROCHLORIDE 500 MG/1
500 TABLET, EXTENDED RELEASE ORAL 2 TIMES DAILY WITH MEALS
Qty: 180 TABLET | Refills: 1 | Status: SHIPPED | OUTPATIENT
Start: 2024-07-05

## 2024-07-05 RX ORDER — HYOSCYAMINE SULFATE 0.38 MG/1
375 TABLET, EXTENDED RELEASE ORAL 2 TIMES DAILY
COMMUNITY
Start: 2024-05-15

## 2024-07-05 RX ORDER — BUDESONIDE 3 MG/1
3 CAPSULE, COATED PELLETS ORAL DAILY
COMMUNITY
Start: 2024-06-28

## 2024-07-05 RX ORDER — ZOLPIDEM TARTRATE 10 MG/1
10 TABLET ORAL NIGHTLY PRN
Qty: 30 TABLET | Refills: 2 | Status: SHIPPED | OUTPATIENT
Start: 2024-07-05 | End: 2025-01-03

## 2024-07-05 RX ORDER — METOPROLOL SUCCINATE 50 MG/1
50 TABLET, EXTENDED RELEASE ORAL DAILY
Qty: 90 TABLET | Refills: 1 | Status: SHIPPED | OUTPATIENT
Start: 2024-07-05

## 2024-07-05 RX ORDER — TRIAMCINOLONE ACETONIDE 1 MG/G
CREAM TOPICAL 2 TIMES DAILY
Qty: 80 G | Refills: 0 | Status: SHIPPED | OUTPATIENT
Start: 2024-07-05

## 2024-07-05 RX ORDER — SERTRALINE HYDROCHLORIDE 100 MG/1
100 TABLET, FILM COATED ORAL DAILY
Qty: 90 TABLET | Refills: 1 | Status: SHIPPED | OUTPATIENT
Start: 2024-07-05 | End: 2025-07-05

## 2024-07-05 RX ORDER — DIPHENOXYLATE HYDROCHLORIDE AND ATROPINE SULFATE 2.5; .025 MG/1; MG/1
TABLET ORAL
COMMUNITY
Start: 2024-05-09

## 2024-07-05 RX ORDER — BUSPIRONE HYDROCHLORIDE 10 MG/1
10 TABLET ORAL 3 TIMES DAILY
Qty: 270 TABLET | Refills: 1 | Status: SHIPPED | OUTPATIENT
Start: 2024-07-05 | End: 2025-07-05

## 2024-07-05 RX ORDER — SEMAGLUTIDE 2.68 MG/ML
2 INJECTION, SOLUTION SUBCUTANEOUS
Qty: 3 ML | Refills: 5 | Status: SHIPPED | OUTPATIENT
Start: 2024-07-05 | End: 2025-07-05

## 2024-07-05 RX ORDER — CHOLESTYRAMINE 4 G/9G
1 POWDER, FOR SUSPENSION ORAL
COMMUNITY
Start: 2024-03-27

## 2024-07-05 NOTE — PROGRESS NOTES
Subjective:       Patient ID: Jacoby Jean-Baptiste is a 49 y.o. male.    Chief Complaint: No chief complaint on file.    Devin Jean-Baptiste is a 49-year-old male patient who presents to clinic today for medication refills.  Patient has a history of anxiety and depression.  He is currently taking BuSpar 7.5 mg 3 times a day, Zoloft 100 mg daily, and Ativan 0.5 mg twice daily as needed.  He states his anxiety has been much worse recently due to his health.  He denies thoughts of suicide or homicide.  He has a history of ulcerative colitis that has been uncontrolled for awhile now.  He has had 2 recent hospitalizations.  He is followed by Dr. Avitia with gastroenterology.  Patient states they are considering colectomy due to severity of his problem.  He does currently have diarrhea in his taking Imodium as needed.  He denies blood in his stool.  Hyperlipidemia: last lipids: TC:  142, Tri HDL:  44, LDL:  81.4 he is currently taking simvastatin 20 mg daily.  History of SVT, patient denies chest pain or palpitations.  History of C diff with recent negative test.  He does have diarrhea in his using Imodium as needed.  Type 2 diabetes with his last A1c of 5.2.  He is currently taking metformin and Ozempic 1 mg weekly.  His weight is stable at 279 lb and a BMI of 46.59.  He does have a history of B12 deficiency in his last B12 was 344.  He is taken over-the-counter sublingual B12.  He is still smoking a couple of cigarettes daily.  Insomnia and is taking Ambien which due to his high anxiety feels like he has not sleeping well with it.          Review of patient's allergies indicates:  No Known Allergies  Social Determinants of Health     Tobacco Use: Medium Risk (2024)    Patient History     Smoking Tobacco Use: Former     Smokeless Tobacco Use: Never     Passive Exposure: Not on file   Alcohol Use: Not At Risk (2024)    [facility]    AUDIT-C     Frequency of  Alcohol Consumption: Never     Average Number of Drinks: Patient does not drink     Frequency of Binge Drinking: Never   Financial Resource Strain: Low Risk  (6/14/2024)    Received from [facility]    Overall Financial Resource Strain (CARDIA)     Difficulty of Paying Living Expenses: Not hard at all   Food Insecurity: No Food Insecurity (6/14/2024)    Received from [facility]    Hunger Vital Sign     Worried About Running Out of Food in the Last Year: Never true     Ran Out of Food in the Last Year: Never true   Transportation Needs: No Transportation Needs (6/14/2024)    Received from [facility]    PRAPARE - Transportation     Lack of Transportation (Medical): No     Lack of Transportation (Non-Medical): No   Physical Activity: Inactive (6/14/2024)    Received from [facility]    Exercise Vital Sign     Days of Exercise per Week: 0 days     Minutes of Exercise per Session: 0 min   Stress: No Stress Concern Present (6/14/2024)    Received from [facility]    [facility] - Occupational Stress Questionnaire     Feeling of Stress : Not at all   Housing Stability: Low Risk  (6/5/2024)    Housing Stability Vital Sign     Unable to Pay for Housing in the Last Year: No     Homeless in the Last Year: No   Depression: Not at risk (6/14/2024)    Received from [facility]    PHQ-2     Patient Health Questionnaire-2 Score: 0   Utilities: Not At Risk (6/14/2024)    Received from [facility] Utilities     Threatened with loss of utilities: No   Health Literacy: Adequate Health Literacy (6/14/2024)    Received from [facility]     Health Literacy     Frequency of need for help with medical  instructions: Never   Social Isolation: Not on file      Past Medical History:   Diagnosis Date    C. difficile colitis     Cavitary pneumonia 11/2023    pos aspergillus serology    Diabetes mellitus 01/2022    Hyperlipidemia 2015    Hypertension 2015    Insomnia 2015    Ulcerative colitis       Past Surgical History:   Procedure Laterality Date    BRONCHOSCOPY WITH FLUOROSCOPY Left 11/20/2023    Procedure: BRONCHOSCOPY, WITH FLUOROSCOPY;  Surgeon: Lisa Villarreal MD;  Location: [facility];  Service: Pulmonary;  Laterality: Left;    BRONCHOSCOPY WITH FLUOROSCOPY N/A 11/30/2023    Procedure: BRONCHOSCOPY, WITH FLUOROSCOPY;  Surgeon: Lisa Villarreal MD;  Location: [facility];  Service: Pulmonary;  Laterality: N/A;    CARDIAC ELECTROPHYSIOLOGY MAPPING AND ABLATION  2017    SVT    CHOLECYSTECTOMY  2011    COLONOSCOPY N/A 5/3/2022    Procedure: COLONOSCOPY;  Surgeon: Shan Jean III, MD;  Location: [facility];  Service: Endoscopy;  Laterality: N/A;    COLONOSCOPY N/A 3/16/2024    Procedure: COLONOSCOPY;  Surgeon: ALEKSANDER Ramos MD;  Location: [facility];  Service: Endoscopy;  Laterality: N/A;    COLONOSCOPY WITH FECAL MICROBIOTA TRANSFER N/A 3/21/2023    Procedure: COLONOSCOPY, WITH FECAL MICROBIOTA TRANSFER;  Surgeon: David Millan MD;  Location: [facility];  Service: Endoscopy;  Laterality: N/A;    ESOPHAGOGASTRODUODENOSCOPY N/A 4/24/2023    Procedure: EGD (ESOPHAGOGASTRODUODENOSCOPY);  Surgeon: Shan Jean III, MD;  Location: [facility];  Service: Endoscopy;  Laterality: N/A;    ESOPHAGOGASTRODUODENOSCOPY N/A 6/5/2024    Procedure: EGD (ESOPHAGOGASTRODUODENOSCOPY);  Surgeon: Odalis Mccabe MD;  Location: [facility];  Service: Endoscopy;  Laterality: N/A;    FLEXIBLE SIGMOIDOSCOPY N/A 6/5/2024    Procedure: SIGMOIDOSCOPY, FLEXIBLE;  Surgeon: Odalis Mccabe MD;  Location: [facility];  Service: Endoscopy;  Laterality: N/A;    GANGLION CYST EXCISION Left 1992    UMBILICAL  HERNIA REPAIR  2011    with mesh      Social History     Socioeconomic History    Marital status: Single         Current Outpatient Medications:     budesonide (ENTOCORT EC) 3 mg capsule, Take 3 mg by mouth once daily., Disp: , Rfl:     cholestyramine (QUESTRAN) 4 gram packet, Take 1 packet by mouth., Disp: , Rfl:     diphenoxylate-atropine 2.5-0.025 mg (LOMOTIL) 2.5-0.025 mg per tablet, TAKE 1 TABLET BY MOUTH TWICE DAILY AS NEEDED FOR DIARRHEA, Disp: , Rfl:     ergocalciferol (VITAMIN D2) 50,000 unit Cap, Take 1 capsule (50,000 Units total) by mouth every 7 days., Disp: 12 capsule, Rfl: 1    hyoscyamine (LEVBID) 0.375 mg Tb12, Take 375 mcg by mouth 2 (two) times daily., Disp: , Rfl:     promethazine (PHENERGAN) 25 MG tablet, Take 1 tablet (25 mg total) by mouth 4 (four) times daily as needed for Nausea., Disp: 25 tablet, Rfl: 0    busPIRone (BUSPAR) 10 MG tablet, Take 1 tablet (10 mg total) by mouth 3 (three) times daily., Disp: 270 tablet, Rfl: 1    lipase-protease-amylase 12,000-38,000-60,000 units (CREON) CpDR, Take 3 capsules by mouth 3 (three) times daily with meals. (Patient not taking: Reported on 7/5/2024), Disp: 270 capsule, Rfl: 11    LORazepam (ATIVAN) 0.5 MG tablet, Take 1 tablet (0.5 mg total) by mouth every 12 (twelve) hours as needed for Anxiety., Disp: 60 tablet, Rfl: 2    metFORMIN (GLUCOPHAGE-XR) 500 MG ER 24hr tablet, Take 1 tablet (500 mg total) by mouth 2 (two) times daily with meals., Disp: 180 tablet, Rfl: 1    metoprolol succinate (TOPROL-XL) 50 MG 24 hr tablet, Take 1 tablet (50 mg total) by mouth once daily., Disp: 90 tablet, Rfl: 1    semaglutide (OZEMPIC) 2 mg/dose (8 mg/3 mL) PnIj, Inject 2 mg into the skin every 7 days., Disp: 3 mL, Rfl: 5    sertraline (ZOLOFT) 100 MG tablet, Take 1 tablet (100 mg total) by mouth once daily., Disp: 90 tablet, Rfl: 1    simvastatin (ZOCOR) 20 MG tablet, Take 1 tablet (20 mg total) by mouth every evening., Disp: 90 tablet, Rfl: 1     triamcinolone acetonide 0.1% (KENALOG) 0.1 % cream, Apply topically 2 (two) times daily., Disp: 80 g, Rfl: 0    zolpidem (AMBIEN) 10 mg Tab, Take 1 tablet (10 mg total) by mouth nightly as needed (insomnia)., Disp: 30 tablet, Rfl: 2  No current facility-administered medications for this visit.    Facility-Administered Medications Ordered in Other Visits:     lactated ringers infusion, , Intravenous, Continuous, David Millan MD, Last Rate: 75 mL/hr at 03/21/23 1252, New Bag at 03/21/23 1252    Lab Results   Component Value Date    WBC 5.45 06/08/2024    HGB 11.2 (L) 06/08/2024    HCT 35.0 (L) 06/08/2024     06/08/2024    CHOL 142 10/27/2023    TRIG 83 10/27/2023    HDL 44 10/27/2023    ALT 11 06/08/2024    AST 6 (L) 06/08/2024     06/08/2024    K 3.7 06/08/2024     06/08/2024    CREATININE 1.1 06/08/2024    BUN 12 06/08/2024    CO2 27 06/08/2024    TSH 1.101 04/10/2024    PSA 0.24 03/10/2021    HGBA1C 5.2 06/06/2024    MICROALBUR 1.2 01/26/2022       Review of Systems   Constitutional:  Positive for fatigue. Negative for chills and fever.   Respiratory:  Negative for chest tightness and shortness of breath.    Cardiovascular:  Negative for chest pain and palpitations.   Gastrointestinal:  Positive for abdominal pain, diarrhea and rectal pain. Negative for nausea and vomiting.   Genitourinary: Negative.    Musculoskeletal:  Positive for back pain.   Neurological: Negative.    Psychiatric/Behavioral:  Positive for dysphoric mood and sleep disturbance. Negative for suicidal ideas. The patient is nervous/anxious.        Objective:      Physical Exam  Vitals reviewed.   Constitutional:       Appearance: He is obese.   HENT:      Right Ear: Tympanic membrane normal.      Left Ear: Tympanic membrane normal.      Nose: Nose normal.      Mouth/Throat:      Pharynx: Oropharynx is clear.   Eyes:      Conjunctiva/sclera: Conjunctivae normal.   Cardiovascular:      Rate and Rhythm: Normal rate and  regular rhythm.      Pulses: Normal pulses.      Heart sounds: Normal heart sounds.   Pulmonary:      Effort: Pulmonary effort is normal.      Breath sounds: Normal breath sounds.   Abdominal:      General: Bowel sounds are increased.      Tenderness: There is generalized abdominal tenderness.   Skin:     General: Skin is warm and dry.      Capillary Refill: Capillary refill takes less than 2 seconds.      Findings: Rash present. Rash is purpuric.      Comments: Patient has multiple scratches on bilateral forearms.   Neurological:      General: No focal deficit present.      Mental Status: He is alert.   Psychiatric:         Mood and Affect: Affect normal. Mood is depressed.         Speech: Speech normal.         Behavior: Behavior normal. Behavior is cooperative.         Thought Content: Thought content normal. Thought content does not include homicidal or suicidal ideation. Thought content does not include homicidal or suicidal plan.       Assessment:       1. Type 2 diabetes mellitus without complication, without long-term current use of insulin    2. Anxiety    3. Pure hypercholesterolemia    4. Depression, unspecified depression type    5. History of supraventricular tachycardia    6. Ulcerative colitis with rectal bleeding, unspecified location    7. Insomnia, unspecified type    8. Vitamin B12 deficiency    9. PUD (peptic ulcer disease)    10. Rash        Plan:       Diagnoses and all orders for this visit:    Type 2 diabetes mellitus without complication, without long-term current use of insulin  -     metFORMIN (GLUCOPHAGE-XR) 500 MG ER 24hr tablet; Take 1 tablet (500 mg total) by mouth 2 (two) times daily with meals.  -     semaglutide (OZEMPIC) 2 mg/dose (8 mg/3 mL) PnIj; Inject 2 mg into the skin every 7 days.    Anxiety  -     LORazepam (ATIVAN) 0.5 MG tablet; Take 1 tablet (0.5 mg total) by mouth every 12 (twelve) hours as needed for Anxiety.  -     busPIRone (BUSPAR) 10 MG tablet; Take 1 tablet (10 mg  total) by mouth 3 (three) times daily.  -     sertraline (ZOLOFT) 100 MG tablet; Take 1 tablet (100 mg total) by mouth once daily.    Pure hypercholesterolemia  -     simvastatin (ZOCOR) 20 MG tablet; Take 1 tablet (20 mg total) by mouth every evening.    Depression, unspecified depression type    History of supraventricular tachycardia  -     metoprolol succinate (TOPROL-XL) 50 MG 24 hr tablet; Take 1 tablet (50 mg total) by mouth once daily.    Ulcerative colitis with rectal bleeding, unspecified location    Insomnia, unspecified type  -     zolpidem (AMBIEN) 10 mg Tab; Take 1 tablet (10 mg total) by mouth nightly as needed (insomnia).    Vitamin B12 deficiency    PUD (peptic ulcer disease)    Rash  -     triamcinolone acetonide 0.1% (KENALOG) 0.1 % cream; Apply topically 2 (two) times daily.    Other orders  -     Zoster Recombinant Vaccine  The following orders have not been finalized:  -     Cancel: semaglutide (OZEMPIC) 1 mg/dose (4 mg/3 mL)    Type 2 diabetes   - continue metformin at current dose  - increase Ozempic to 2 mg weekly.  Hopefully this will help with some weight loss.  - ADA diet    Anxiety   - increase BuSpar to 10 mg 3 times a day  - continue Zoloft 100 mg daily  - continue Ativan 0.5 mg twice daily as needed.  Refilled x3 months,  checked    Hyperlipidemia   - continue simvastatin at current  - I recommend a heart healthy diet rich in fiber, fresh vegetables and fruit and low in saturated fats (fried foods, red meat, etc.).  I also recommend regular exercise.    History of SVT   - continue metoprolol at current dose    Insomnia   - Ambien refilled x3,  checked  - do not take within 4-6 hours of Ativan.    Ulcerative colitis   - follow-up with gastroenterology  - continue Imodium as directed for diarrhea    Apply triamcinolone to bilateral forearms as prescribed.  Labs reviewed and up-to-date in recent hospitalizations.  Shingles vaccination today.  Eye exam due in September.  Follow-up  in 3 months or sooner if needed.

## 2024-07-06 ENCOUNTER — HOSPITAL ENCOUNTER (EMERGENCY)
Facility: HOSPITAL | Age: 49
Discharge: HOME | End: 2024-07-06
Attending: EMERGENCY MEDICINE
Payer: COMMERCIAL

## 2024-07-06 VITALS
BODY MASS INDEX: 46.48 KG/M2 | DIASTOLIC BLOOD PRESSURE: 77 MMHG | OXYGEN SATURATION: 96 % | HEIGHT: 65 IN | RESPIRATION RATE: 16 BRPM | TEMPERATURE: 98 F | HEART RATE: 90 BPM | SYSTOLIC BLOOD PRESSURE: 162 MMHG | WEIGHT: 279 LBS

## 2024-07-06 DIAGNOSIS — N20.0 KIDNEY STONE: ICD-10-CM

## 2024-07-06 DIAGNOSIS — K52.9 PROCTOCOLITIS: Primary | ICD-10-CM

## 2024-07-06 LAB
ALBUMIN SERPL BCP-MCNC: 3.7 G/DL (ref 3.5–5.2)
ALP SERPL-CCNC: 72 U/L (ref 55–135)
ALT SERPL W/O P-5'-P-CCNC: 53 U/L (ref 10–44)
ANION GAP SERPL CALC-SCNC: 7 MMOL/L (ref 8–16)
AST SERPL-CCNC: 24 U/L (ref 10–40)
BASOPHILS # BLD AUTO: 0.03 K/UL (ref 0–0.2)
BASOPHILS NFR BLD: 0.2 % (ref 0–1.9)
BILIRUB SERPL-MCNC: 0.3 MG/DL (ref 0.1–1)
BILIRUB UR QL STRIP: NEGATIVE
BUN SERPL-MCNC: 17 MG/DL (ref 6–20)
CALCIUM SERPL-MCNC: 8.4 MG/DL (ref 8.7–10.5)
CHLORIDE SERPL-SCNC: 108 MMOL/L (ref 95–110)
CLARITY UR: CLEAR
CO2 SERPL-SCNC: 27 MMOL/L (ref 23–29)
COLOR UR: COLORLESS
CREAT SERPL-MCNC: 1 MG/DL (ref 0.5–1.4)
DIFFERENTIAL METHOD BLD: ABNORMAL
EOSINOPHIL # BLD AUTO: 0 K/UL (ref 0–0.5)
EOSINOPHIL NFR BLD: 0 % (ref 0–8)
ERYTHROCYTE [DISTWIDTH] IN BLOOD BY AUTOMATED COUNT: 16.3 % (ref 11.5–14.5)
EST. GFR  (NO RACE VARIABLE): >60 ML/MIN/1.73 M^2
GLUCOSE SERPL-MCNC: 115 MG/DL (ref 70–110)
GLUCOSE UR QL STRIP: NEGATIVE
HCT VFR BLD AUTO: 41.3 % (ref 40–54)
HGB BLD-MCNC: 13.2 G/DL (ref 14–18)
HGB UR QL STRIP: NEGATIVE
IMM GRANULOCYTES # BLD AUTO: 0.19 K/UL (ref 0–0.04)
IMM GRANULOCYTES NFR BLD AUTO: 1.3 % (ref 0–0.5)
KETONES UR QL STRIP: NEGATIVE
LDH SERPL L TO P-CCNC: 2.15 MMOL/L (ref 0.5–2.2)
LEUKOCYTE ESTERASE UR QL STRIP: NEGATIVE
LIPASE SERPL-CCNC: 5 U/L (ref 4–60)
LYMPHOCYTES # BLD AUTO: 0.4 K/UL (ref 1–4.8)
LYMPHOCYTES NFR BLD: 2.9 % (ref 18–48)
MCH RBC QN AUTO: 31 PG (ref 27–31)
MCHC RBC AUTO-ENTMCNC: 32 G/DL (ref 32–36)
MCV RBC AUTO: 97 FL (ref 82–98)
MONOCYTES # BLD AUTO: 0.3 K/UL (ref 0.3–1)
MONOCYTES NFR BLD: 1.9 % (ref 4–15)
NEUTROPHILS # BLD AUTO: 13.6 K/UL (ref 1.8–7.7)
NEUTROPHILS NFR BLD: 93.7 % (ref 38–73)
NITRITE UR QL STRIP: NEGATIVE
NRBC BLD-RTO: 0 /100 WBC
PH UR STRIP: 7 [PH] (ref 5–8)
PLATELET # BLD AUTO: 233 K/UL (ref 150–450)
PMV BLD AUTO: 9.7 FL (ref 9.2–12.9)
POTASSIUM SERPL-SCNC: 4.2 MMOL/L (ref 3.5–5.1)
PROT SERPL-MCNC: 6.3 G/DL (ref 6–8.4)
PROT UR QL STRIP: NEGATIVE
RBC # BLD AUTO: 4.26 M/UL (ref 4.6–6.2)
SAMPLE: NORMAL
SODIUM SERPL-SCNC: 142 MMOL/L (ref 136–145)
SP GR UR STRIP: >1.03 (ref 1–1.03)
URN SPEC COLLECT METH UR: ABNORMAL
UROBILINOGEN UR STRIP-ACNC: NEGATIVE EU/DL
WBC # BLD AUTO: 14.53 K/UL (ref 3.9–12.7)

## 2024-07-06 PROCEDURE — 99285 EMERGENCY DEPT VISIT HI MDM: CPT | Mod: 25

## 2024-07-06 PROCEDURE — 25500020 PHARM REV CODE 255: Performed by: NURSE PRACTITIONER

## 2024-07-06 PROCEDURE — 83690 ASSAY OF LIPASE: CPT | Performed by: NURSE PRACTITIONER

## 2024-07-06 PROCEDURE — 96361 HYDRATE IV INFUSION ADD-ON: CPT

## 2024-07-06 PROCEDURE — 80053 COMPREHEN METABOLIC PANEL: CPT | Performed by: NURSE PRACTITIONER

## 2024-07-06 PROCEDURE — 85025 COMPLETE CBC W/AUTO DIFF WBC: CPT | Performed by: NURSE PRACTITIONER

## 2024-07-06 PROCEDURE — 81003 URINALYSIS AUTO W/O SCOPE: CPT | Performed by: NURSE PRACTITIONER

## 2024-07-06 PROCEDURE — 63600175 PHARM REV CODE 636 W HCPCS: Performed by: NURSE PRACTITIONER

## 2024-07-06 PROCEDURE — 96374 THER/PROPH/DIAG INJ IV PUSH: CPT

## 2024-07-06 PROCEDURE — 25000003 PHARM REV CODE 250: Performed by: NURSE PRACTITIONER

## 2024-07-06 RX ORDER — HYDROCODONE BITARTRATE AND ACETAMINOPHEN 5; 325 MG/1; MG/1
1 TABLET ORAL EVERY 6 HOURS PRN
Qty: 10 TABLET | Refills: 0 | Status: SHIPPED | OUTPATIENT
Start: 2024-07-06

## 2024-07-06 RX ORDER — ONDANSETRON HYDROCHLORIDE 2 MG/ML
4 INJECTION, SOLUTION INTRAVENOUS
Status: COMPLETED | OUTPATIENT
Start: 2024-07-06 | End: 2024-07-06

## 2024-07-06 RX ADMIN — IOHEXOL 100 ML: 350 INJECTION, SOLUTION INTRAVENOUS at 04:07

## 2024-07-06 RX ADMIN — ONDANSETRON 4 MG: 2 INJECTION INTRAMUSCULAR; INTRAVENOUS at 04:07

## 2024-07-06 RX ADMIN — SODIUM CHLORIDE 1000 ML: 9 INJECTION, SOLUTION INTRAVENOUS at 04:07

## 2024-07-06 NOTE — FIRST PROVIDER EVALUATION
Emergency Department TeleTriage Encounter Note      CHIEF COMPLAINT    Chief Complaint   Patient presents with    Abdominal Pain    Vomiting    Diarrhea     Abd pain, N/V/D for about a week, has been seen here       VITAL SIGNS   Initial Vitals [07/06/24 1529]   BP Pulse Resp Temp SpO2   (!) 185/102 97 16 98.3 °F (36.8 °C) 99 %      MAP       --            ALLERGIES    Review of patient's allergies indicates:  No Known Allergies    PROVIDER TRIAGE NOTE  This is a teletriage evaluation of a 49 y.o. male presenting to the ED complaining of abd pain with n/v/d for one week. Pmhx of . States bloody stools which are not new.     Alert, no distress.     Initial orders will be placed and care will be transferred to an alternate provider when patient is roomed for a full evaluation. Any additional orders and the final disposition will be determined by that provider.         ORDERS  Labs Reviewed - No data to display    ED Orders (720h ago, onward)      Start Ordered     Status Ordering Provider    07/06/24 1545 07/06/24 1535  sodium chloride 0.9% bolus 1,000 mL 1,000 mL  Once         Ordered SELAM MCCOY N.    07/06/24 1545 07/06/24 1535  ondansetron injection 4 mg  ED 1 Time         Ordered SELAM MCCOY N.    07/06/24 1536 07/06/24 1535  Vital signs  Every 2 hours         Ordered SELAM MCCOY N.    07/06/24 1535 07/06/24 1535  Diet NPO  Diet effective now         Ordered SELAM MCCOY N.    07/06/24 1535 07/06/24 1535  Insert peripheral IV  Once         Ordered SELAM MCCOY N.    07/06/24 1535 07/06/24 1535  CBC W/ AUTO DIFFERENTIAL  STAT         Ordered SELAM MCCOY N.    07/06/24 1535 07/06/24 1535  Comp. Metabolic Panel  STAT         Ordered SELAM MCCOY N.    07/06/24 1535 07/06/24 1535  Lipase  STAT         Ordered SELAM MCCOY N.    07/06/24 1535 07/06/24 1535  Urinalysis, Reflex to Urine Culture Urine, Clean Catch  STAT         Ordered  SELAM MCCOY    Unscheduled 07/06/24 1535  Lactic acid, plasma  STAT         Ordered SELAM MCCOY              Virtual Visit Note: The provider triage portion of this emergency department evaluation and documentation was performed via [employer], a HIPAA-compliant telemedicine application, in concert with a tele-presenter in the room. A face to face patient evaluation with one of my colleagues will occur once the patient is placed in an emergency department room.      DISCLAIMER: This note was prepared with [employer] voice recognition transcription software. Garbled syntax, mangled pronouns, and other bizarre constructions may be attributed to that software system.

## 2024-07-06 NOTE — DISCHARGE INSTRUCTIONS
Make a follow-up appoint with your GI doctor.  Taking medication as prescribed.  You can not drive on this medication.  Return to the ED for any worsening of symptoms or any other concerns.

## 2024-07-06 NOTE — ED NOTES
Patient returned from CT.  Patient had incontinent episode of bowels while at CT.  Patient given change of pants and wipes to clean.

## 2024-07-06 NOTE — ED PROVIDER NOTES
Encounter Date: 7/6/2024       History     Chief Complaint   Patient presents with    Abdominal Pain    Vomiting    Diarrhea     Abd pain, N/V/D for about a week, has been seen here     HPI  Review of patient's allergies indicates:  No Known Allergies  Past Medical History:   Diagnosis Date    C. difficile colitis     Cavitary pneumonia 11/2023    pos aspergillus serology    Diabetes mellitus 01/2022    Hyperlipidemia 2015    Hypertension 2015    Insomnia 2015    Ulcerative colitis      Past Surgical History:   Procedure Laterality Date    BRONCHOSCOPY WITH FLUOROSCOPY Left 11/20/2023    Procedure: BRONCHOSCOPY, WITH FLUOROSCOPY;  Surgeon: Lisa Villarreal MD;  Location: [facility];  Service: Pulmonary;  Laterality: Left;    BRONCHOSCOPY WITH FLUOROSCOPY N/A 11/30/2023    Procedure: BRONCHOSCOPY, WITH FLUOROSCOPY;  Surgeon: Lisa Villarreal MD;  Location: [facility];  Service: Pulmonary;  Laterality: N/A;    CARDIAC ELECTROPHYSIOLOGY MAPPING AND ABLATION  2017    SVT    CHOLECYSTECTOMY  2011    COLONOSCOPY N/A 5/3/2022    Procedure: COLONOSCOPY;  Surgeon: Shan Jean III, MD;  Location: [facility];  Service: Endoscopy;  Laterality: N/A;    COLONOSCOPY N/A 3/16/2024    Procedure: COLONOSCOPY;  Surgeon: ALEKSANDER Ramos MD;  Location: [facility];  Service: Endoscopy;  Laterality: N/A;    COLONOSCOPY WITH FECAL MICROBIOTA TRANSFER N/A 3/21/2023    Procedure: COLONOSCOPY, WITH FECAL MICROBIOTA TRANSFER;  Surgeon: David Millan MD;  Location: [facility];  Service: Endoscopy;  Laterality: N/A;    ESOPHAGOGASTRODUODENOSCOPY N/A 4/24/2023    Procedure: EGD (ESOPHAGOGASTRODUODENOSCOPY);  Surgeon: Shan Jean III, MD;  Location: [facility];  Service: Endoscopy;  Laterality: N/A;    ESOPHAGOGASTRODUODENOSCOPY N/A 6/5/2024    Procedure: EGD (ESOPHAGOGASTRODUODENOSCOPY);  Surgeon: Odalis Mccabe MD;  Location: [facility];  Service: Endoscopy;  Laterality: N/A;    FLEXIBLE SIGMOIDOSCOPY N/A 6/5/2024     Procedure: SIGMOIDOSCOPY, FLEXIBLE;  Surgeon: Odalis Mccabe MD;  Location: [facility];  Service: Endoscopy;  Laterality: N/A;    GANGLION CYST EXCISION Left 1992    UMBILICAL HERNIA REPAIR  2011    with mesh     Family History   Problem Relation Name Age of Onset    Asthma Mother       Social History     Tobacco Use    Smoking status: Former     Current packs/day: 1.00     Average packs/day: 1 pack/day for 31.5 years (31.5 ttl pk-yrs)     Types: Cigarettes     Start date: 1993    Smokeless tobacco: Never    Tobacco comments:     Pt states he has stopped smoking with Chantix three weeks ago. 12/1/23   Substance Use Topics    Alcohol use: Yes     Comment: ocass    Drug use: Not Currently     Review of Systems   Constitutional:  Negative for fever.   Respiratory:  Negative for cough, shortness of breath and wheezing.    Cardiovascular:  Negative for chest pain, palpitations and leg swelling.   Gastrointestinal:  Positive for abdominal pain, diarrhea and vomiting. Negative for nausea.   Musculoskeletal:  Negative for gait problem.   Skin:  Negative for rash.   Neurological:  Negative for weakness.       Physical Exam     Initial Vitals [07/06/24 1529]   BP Pulse Resp Temp SpO2   (!) 185/102 97 16 98.3 °F (36.8 °C) 99 %      MAP       --         Physical Exam    Constitutional: He appears well-developed and well-nourished.   HENT:   Head: Normocephalic.   Mouth/Throat: Oropharynx is clear and moist.   Eyes: Conjunctivae are normal.   Neck: Neck supple.   Normal range of motion.  Cardiovascular:  Normal rate and regular rhythm.           Pulmonary/Chest: Breath sounds normal. No respiratory distress.   Abdominal: Abdomen is soft. Bowel sounds are normal. He exhibits no distension. There is abdominal tenderness.   Generalized tenderness to palpation.  Bowel sounds are positive.  No rebound go guarding There is no rebound and no guarding.   Musculoskeletal:         General: Normal range of motion.      Cervical back:  Normal range of motion and neck supple.      Comments: Patient was ambulatory per self his gait is steady.     Neurological: He is alert and oriented to person, place, and time. No sensory deficit. GCS score is 15. GCS eye subscore is 4. GCS verbal subscore is 5. GCS motor subscore is 6.   Skin: Skin is warm. Capillary refill takes less than 2 seconds.   Psychiatric: He has a normal mood and affect. Thought content normal.         ED Course   Procedures  Labs Reviewed   CBC W/ AUTO DIFFERENTIAL - Abnormal; Notable for the following components:       Result Value    WBC 14.53 (*)     RBC 4.26 (*)     Hemoglobin 13.2 (*)     RDW 16.3 (*)     Immature Granulocytes 1.3 (*)     Gran # (ANC) 13.6 (*)     Immature Grans (Abs) 0.19 (*)     Lymph # 0.4 (*)     Gran % 93.7 (*)     Lymph % 2.9 (*)     Mono % 1.9 (*)     All other components within normal limits   COMPREHENSIVE METABOLIC PANEL - Abnormal; Notable for the following components:    Glucose 115 (*)     Calcium 8.4 (*)     ALT 53 (*)     Anion Gap 7 (*)     All other components within normal limits   URINALYSIS, REFLEX TO URINE CULTURE - Abnormal; Notable for the following components:    Color, UA Colorless (*)     Specific Gravity, UA >1.030 (*)     All other components within normal limits    Narrative:     Specimen Source->Urine   LIPASE   ISTAT LACTATE          Imaging Results              CT Abdomen Pelvis With IV Contrast NO Oral Contrast (Final result)  Result time 07/06/24 17:18:09      Final result by Wyatt Blankenship DO (07/06/24 17:18:09)                   Impression:      1. 4 mm calculus at the right UPJ.  No hydronephrosis.  Additional nonobstructing bilateral nephrolithiasis.  2. Resolving proctocolitis with minimal residual inflammation about the sigmoid colon.      Electronically signed by: Wyatt Blankenship  Date:    07/06/2024  Time:    17:18               Narrative:    EXAMINATION:  CT ABDOMEN PELVIS WITH IV CONTRAST    CLINICAL HISTORY:  Abdominal  abscess/infection suspected;    TECHNIQUE:  Axial CT images with sagittal and coronal reformats were obtained of the abdomen and pelvis from the hemidiaphragms through the symphysis pubis after the administration of 100mL Omnipaque 350.    COMPARISON:  CT of the abdomen and pelvis from 06/04/2024.    FINDINGS:  Lung Bases: Clear.    Heart: Heart size is normal.  No pericardial effusion.    Liver: The liver is normal in size and demonstrates homogeneous enhancement without focal lesion.  The portal vasculature is patent.    Biliary tract: No intrahepatic or extrahepatic biliary ductal dilatation.    Gallbladder: Surgically absent.    Pancreas: Normal. No pancreatic ductal dilatation.    Spleen: Normal size without focal lesion.    Adrenals: Unremarkable.    Kidneys and urinary collecting systems: There is nonobstructing left-sided nephrolithiasis, measuring up to approximately 6 mm.  There is a 4 mm calculus at the right UPJ, which has migrated from the lower pole where it was seen previously on CT dated 06/04/2024.  There is no hydronephrosis.  Additional punctate right nephrolithiasis.    Lymph nodes: None enlarged.    Stomach and bowel: The stomach is normal.  There is duodenal diverticulum.  Loops of small and large bowel are normal in caliber without evidence for inflammation or obstruction.  There is continued mild inflammation about the sigmoid colon.  The remaining previously described proctocolitis has essentially resolved.  The appendix is normal.    Peritoneum and mesentery: No ascites or free intraperitoneal air.  No abdominal fluid collection.  Stable small peripherally calcified lesion in the anterior mesentery.    Vasculature: No aneurysm or significant atherosclerosis.    Urinary bladder: No wall thickening.    Reproductive organs: The prostate is not enlarged.    Body wall: No abnormality.    Musculoskeletal: No aggressive osseous lesion.  There are degenerative changes.                                        Medications   sodium chloride 0.9% bolus 1,000 mL 1,000 mL (0 mLs Intravenous Stopped 7/6/24 1643)   ondansetron injection 4 mg (4 mg Intravenous Given 7/6/24 1601)   iohexoL (OMNIPAQUE 350) injection 100 mL (100 mLs Intravenous Given 7/6/24 1653)     Medical Decision Making  Presents with generalized abdominal pain.  Onset 1 week.  Patient has been having vomiting and diarrhea for a week.  He has a patient of Dr. Adama campos.  His history of ulcerative colitis.  He was also been seeing a surgeon for possible colectomy.  He was awaiting approval of Omvah injection.  He was seen here in the ED on 06/10 diagnosed with colitis.  He continues to have abdominal pain.    Amount and/or Complexity of Data Reviewed  Labs:      Details: His labs are within normal limits for this patient with the exception of a 14.6 white count.  His glucose is 115.  His lactic acid is normal at 1.5.    Radiology:      Details: His CT of the abdomen with contrast reports a 4 mm calculi to the UPJ.  No hydronephrosis.  Additional nonobstructing bilateral kidney stones.  It also reports a resolving procto colitis with minimal residual inflammation about the sigmoid colon.  Discussion of management or test interpretation with external provider(s): Patient was not given anything here for pain as he has no .  He has message Dr. Pendletonfor an appointment.  He is Phenergan at home for nausea.  He was given Lortab 5 mg #10 for home use  He has been given strict return precautions.  His white count is slightly elevated.  He currently is on prednisone.  This may account for the elevated white count.  He has been encouraged to follow up on Monday with his primary care doctor or to return to the ED for any worsening of symptoms or any other concerns.    Risk  Prescription drug management.                                      Clinical Impression:  Final diagnoses:  [K52.9] Proctocolitis (Primary)  [N20.0] Kidney stone          ED  Disposition Condition    Discharge Stable          ED Prescriptions       Medication Sig Dispense Start Date End Date Auth. Provider    HYDROcodone-acetaminophen (NORCO) 5-325 mg per tablet Take 1 tablet by mouth every 6 (six) hours as needed for Pain. 10 tablet 7/6/2024 -- Sarah Stevenson NP          Follow-up Information       Follow up With Specialties Details Why Contact Info    Hunter Aguilar III, MD Family Medicine In 3 days  [address] [facility]  [address] [place] [id number]  [phone]               Sarah Stevenson NP  07/06/24 8039

## 2024-07-12 ENCOUNTER — LAB VISIT (OUTPATIENT)
Dept: LAB | Facility: HOSPITAL | Age: 49
End: 2024-07-12
Attending: INTERNAL MEDICINE
Payer: COMMERCIAL

## 2024-07-12 DIAGNOSIS — R19.7 DIARRHEA OF PRESUMED INFECTIOUS ORIGIN: Primary | ICD-10-CM

## 2024-07-12 LAB — CRP SERPL-MCNC: 1.3 MG/DL

## 2024-07-12 PROCEDURE — 36415 COLL VENOUS BLD VENIPUNCTURE: CPT | Performed by: INTERNAL MEDICINE

## 2024-07-12 PROCEDURE — 83993 ASSAY FOR CALPROTECTIN FECAL: CPT | Performed by: INTERNAL MEDICINE

## 2024-07-12 PROCEDURE — 86140 C-REACTIVE PROTEIN: CPT | Performed by: INTERNAL MEDICINE

## 2024-07-15 ENCOUNTER — HOSPITAL ENCOUNTER (INPATIENT)
Facility: HOSPITAL | Age: 49
LOS: 2 days | Discharge: HOME | DRG: 386 | End: 2024-07-17
Attending: EMERGENCY MEDICINE | Admitting: INTERNAL MEDICINE
Payer: COMMERCIAL

## 2024-07-15 DIAGNOSIS — E86.0 DEHYDRATION: ICD-10-CM

## 2024-07-15 DIAGNOSIS — R10.9 ABDOMINAL PAIN: ICD-10-CM

## 2024-07-15 DIAGNOSIS — K52.9 COLITIS: Primary | ICD-10-CM

## 2024-07-15 PROBLEM — E83.42 HYPOMAGNESEMIA: Status: ACTIVE | Noted: 2024-07-15

## 2024-07-15 LAB
ALBUMIN SERPL BCP-MCNC: 3.6 G/DL (ref 3.5–5.2)
ALP SERPL-CCNC: 66 U/L (ref 55–135)
ALT SERPL W/O P-5'-P-CCNC: 45 U/L (ref 10–44)
AMPHET+METHAMPHET UR QL: NEGATIVE
ANION GAP SERPL CALC-SCNC: 6 MMOL/L (ref 8–16)
AST SERPL-CCNC: 37 U/L (ref 10–40)
BARBITURATES UR QL SCN>200 NG/ML: NEGATIVE
BASOPHILS # BLD AUTO: 0.06 K/UL (ref 0–0.2)
BASOPHILS NFR BLD: 0.4 % (ref 0–1.9)
BENZODIAZ UR QL SCN>200 NG/ML: NEGATIVE
BILIRUB SERPL-MCNC: 0.3 MG/DL (ref 0.1–1)
BILIRUB UR QL STRIP: NEGATIVE
BUN SERPL-MCNC: 11 MG/DL (ref 6–20)
BZE UR QL SCN: NEGATIVE
C DIFF GDH STL QL: NEGATIVE
C DIFF TOX A+B STL QL IA: NEGATIVE
CALCIUM SERPL-MCNC: 8.8 MG/DL (ref 8.7–10.5)
CANNABINOIDS UR QL SCN: NEGATIVE
CHLORIDE SERPL-SCNC: 109 MMOL/L (ref 95–110)
CK SERPL-CCNC: 25 U/L (ref 20–200)
CLARITY UR: CLEAR
CO2 SERPL-SCNC: 26 MMOL/L (ref 23–29)
COLOR UR: COLORLESS
CREAT SERPL-MCNC: 1 MG/DL (ref 0.5–1.4)
CREAT UR-MCNC: 55.5 MG/DL (ref 23–375)
CRP SERPL-MCNC: 1.8 MG/DL
DIFFERENTIAL METHOD BLD: ABNORMAL
EOSINOPHIL # BLD AUTO: 0.1 K/UL (ref 0–0.5)
EOSINOPHIL NFR BLD: 0.6 % (ref 0–8)
ERYTHROCYTE [DISTWIDTH] IN BLOOD BY AUTOMATED COUNT: 16.7 % (ref 11.5–14.5)
EST. GFR  (NO RACE VARIABLE): >60 ML/MIN/1.73 M^2
GLUCOSE SERPL-MCNC: 102 MG/DL (ref 70–110)
GLUCOSE SERPL-MCNC: 93 MG/DL (ref 70–110)
GLUCOSE UR QL STRIP: NEGATIVE
HCT VFR BLD AUTO: 38.9 % (ref 40–54)
HGB BLD-MCNC: 11.8 G/DL (ref 14–18)
HGB UR QL STRIP: NEGATIVE
IMM GRANULOCYTES # BLD AUTO: 0.24 K/UL (ref 0–0.04)
IMM GRANULOCYTES NFR BLD AUTO: 1.4 % (ref 0–0.5)
KETONES UR QL STRIP: NEGATIVE
LEUKOCYTE ESTERASE UR QL STRIP: NEGATIVE
LIPASE SERPL-CCNC: 21 U/L (ref 4–60)
LYMPHOCYTES # BLD AUTO: 1.8 K/UL (ref 1–4.8)
LYMPHOCYTES NFR BLD: 10.5 % (ref 18–48)
MAGNESIUM SERPL-MCNC: 1.5 MG/DL (ref 1.6–2.6)
MCH RBC QN AUTO: 30.3 PG (ref 27–31)
MCHC RBC AUTO-ENTMCNC: 30.3 G/DL (ref 32–36)
MCV RBC AUTO: 100 FL (ref 82–98)
MONOCYTES # BLD AUTO: 1.2 K/UL (ref 0.3–1)
MONOCYTES NFR BLD: 7.3 % (ref 4–15)
NEUTROPHILS # BLD AUTO: 13.4 K/UL (ref 1.8–7.7)
NEUTROPHILS NFR BLD: 79.8 % (ref 38–73)
NITRITE UR QL STRIP: NEGATIVE
NRBC BLD-RTO: 0 /100 WBC
OB PNL STL: NEGATIVE
OPIATES UR QL SCN: NEGATIVE
PCP UR QL SCN>25 NG/ML: NEGATIVE
PH UR STRIP: 6 [PH] (ref 5–8)
PLATELET # BLD AUTO: 234 K/UL (ref 150–450)
PMV BLD AUTO: 10.8 FL (ref 9.2–12.9)
POTASSIUM SERPL-SCNC: 5 MMOL/L (ref 3.5–5.1)
PROT SERPL-MCNC: 6.5 G/DL (ref 6–8.4)
PROT UR QL STRIP: NEGATIVE
RBC # BLD AUTO: 3.89 M/UL (ref 4.6–6.2)
SODIUM SERPL-SCNC: 141 MMOL/L (ref 136–145)
SP GR UR STRIP: 1.01 (ref 1–1.03)
TOXICOLOGY INFORMATION: NORMAL
TROPONIN I SERPL HS-MCNC: 4.9 PG/ML (ref 0–14.9)
TSH SERPL DL<=0.005 MIU/L-ACNC: 1.13 UIU/ML (ref 0.34–5.6)
URN SPEC COLLECT METH UR: ABNORMAL
UROBILINOGEN UR STRIP-ACNC: NEGATIVE EU/DL
WBC # BLD AUTO: 16.73 K/UL (ref 3.9–12.7)
WBC #/AREA STL HPF: NORMAL /[HPF]

## 2024-07-15 PROCEDURE — 87449 NOS EACH ORGANISM AG IA: CPT | Mod: 91 | Performed by: NURSE PRACTITIONER

## 2024-07-15 PROCEDURE — 63600175 PHARM REV CODE 636 W HCPCS: Mod: JZ,JG | Performed by: EMERGENCY MEDICINE

## 2024-07-15 PROCEDURE — 36415 COLL VENOUS BLD VENIPUNCTURE: CPT | Performed by: EMERGENCY MEDICINE

## 2024-07-15 PROCEDURE — 80307 DRUG TEST PRSMV CHEM ANLYZR: CPT | Performed by: NURSE PRACTITIONER

## 2024-07-15 PROCEDURE — 25500020 PHARM REV CODE 255: Performed by: EMERGENCY MEDICINE

## 2024-07-15 PROCEDURE — 99285 EMERGENCY DEPT VISIT HI MDM: CPT | Mod: 25

## 2024-07-15 PROCEDURE — 89055 LEUKOCYTE ASSESSMENT FECAL: CPT | Performed by: NURSE PRACTITIONER

## 2024-07-15 PROCEDURE — 87045 FECES CULTURE AEROBIC BACT: CPT | Performed by: NURSE PRACTITIONER

## 2024-07-15 PROCEDURE — 87209 SMEAR COMPLEX STAIN: CPT | Performed by: NURSE PRACTITIONER

## 2024-07-15 PROCEDURE — 82550 ASSAY OF CK (CPK): CPT | Performed by: NURSE PRACTITIONER

## 2024-07-15 PROCEDURE — 96365 THER/PROPH/DIAG IV INF INIT: CPT

## 2024-07-15 PROCEDURE — 21400001 HC TELEMETRY ROOM

## 2024-07-15 PROCEDURE — 93005 ELECTROCARDIOGRAM TRACING: CPT | Performed by: INTERNAL MEDICINE

## 2024-07-15 PROCEDURE — 83690 ASSAY OF LIPASE: CPT | Performed by: NURSE PRACTITIONER

## 2024-07-15 PROCEDURE — 25000003 PHARM REV CODE 250: Performed by: EMERGENCY MEDICINE

## 2024-07-15 PROCEDURE — 85025 COMPLETE CBC W/AUTO DIFF WBC: CPT | Performed by: NURSE PRACTITIONER

## 2024-07-15 PROCEDURE — 96361 HYDRATE IV INFUSION ADD-ON: CPT

## 2024-07-15 PROCEDURE — 36415 COLL VENOUS BLD VENIPUNCTURE: CPT | Performed by: NURSE PRACTITIONER

## 2024-07-15 PROCEDURE — 87449 NOS EACH ORGANISM AG IA: CPT | Performed by: NURSE PRACTITIONER

## 2024-07-15 PROCEDURE — 83735 ASSAY OF MAGNESIUM: CPT | Performed by: NURSE PRACTITIONER

## 2024-07-15 PROCEDURE — 84484 ASSAY OF TROPONIN QUANT: CPT | Performed by: EMERGENCY MEDICINE

## 2024-07-15 PROCEDURE — 82272 OCCULT BLD FECES 1-3 TESTS: CPT | Performed by: NURSE PRACTITIONER

## 2024-07-15 PROCEDURE — 87040 BLOOD CULTURE FOR BACTERIA: CPT | Mod: 59 | Performed by: EMERGENCY MEDICINE

## 2024-07-15 PROCEDURE — 84443 ASSAY THYROID STIM HORMONE: CPT | Performed by: NURSE PRACTITIONER

## 2024-07-15 PROCEDURE — 93010 ELECTROCARDIOGRAM REPORT: CPT | Mod: ,,, | Performed by: INTERNAL MEDICINE

## 2024-07-15 PROCEDURE — 96375 TX/PRO/DX INJ NEW DRUG ADDON: CPT

## 2024-07-15 PROCEDURE — 87177 OVA AND PARASITES SMEARS: CPT | Performed by: NURSE PRACTITIONER

## 2024-07-15 PROCEDURE — 81003 URINALYSIS AUTO W/O SCOPE: CPT | Performed by: NURSE PRACTITIONER

## 2024-07-15 PROCEDURE — 87046 STOOL CULTR AEROBIC BACT EA: CPT | Performed by: NURSE PRACTITIONER

## 2024-07-15 PROCEDURE — 80053 COMPREHEN METABOLIC PANEL: CPT | Performed by: NURSE PRACTITIONER

## 2024-07-15 PROCEDURE — 87324 CLOSTRIDIUM AG IA: CPT | Performed by: NURSE PRACTITIONER

## 2024-07-15 PROCEDURE — 86140 C-REACTIVE PROTEIN: CPT | Performed by: NURSE PRACTITIONER

## 2024-07-15 RX ORDER — HYOSCYAMINE SULFATE 0.38 MG/1
0.38 TABLET, EXTENDED RELEASE ORAL 2 TIMES DAILY
Status: DISCONTINUED | OUTPATIENT
Start: 2024-07-15 | End: 2024-07-15

## 2024-07-15 RX ORDER — HYDROCODONE BITARTRATE AND ACETAMINOPHEN 5; 325 MG/1; MG/1
1 TABLET ORAL EVERY 6 HOURS PRN
Status: DISCONTINUED | OUTPATIENT
Start: 2024-07-15 | End: 2024-07-17 | Stop reason: HOSPADM

## 2024-07-15 RX ORDER — ATORVASTATIN CALCIUM 10 MG/1
10 TABLET, FILM COATED ORAL DAILY
Status: DISCONTINUED | OUTPATIENT
Start: 2024-07-16 | End: 2024-07-17 | Stop reason: HOSPADM

## 2024-07-15 RX ORDER — METOPROLOL SUCCINATE 50 MG/1
50 TABLET, EXTENDED RELEASE ORAL DAILY
Status: DISCONTINUED | OUTPATIENT
Start: 2024-07-16 | End: 2024-07-17 | Stop reason: HOSPADM

## 2024-07-15 RX ORDER — TALC
6 POWDER (GRAM) TOPICAL NIGHTLY PRN
Status: DISCONTINUED | OUTPATIENT
Start: 2024-07-15 | End: 2024-07-15

## 2024-07-15 RX ORDER — INSULIN ASPART 100 [IU]/ML
0-5 INJECTION, SOLUTION INTRAVENOUS; SUBCUTANEOUS
Status: DISCONTINUED | OUTPATIENT
Start: 2024-07-15 | End: 2024-07-17 | Stop reason: HOSPADM

## 2024-07-15 RX ORDER — SERTRALINE HYDROCHLORIDE 50 MG/1
100 TABLET, FILM COATED ORAL DAILY
Status: DISCONTINUED | OUTPATIENT
Start: 2024-07-16 | End: 2024-07-17 | Stop reason: HOSPADM

## 2024-07-15 RX ORDER — BUDESONIDE 3 MG/1
3 CAPSULE, COATED PELLETS ORAL DAILY
Status: DISCONTINUED | OUTPATIENT
Start: 2024-07-16 | End: 2024-07-17 | Stop reason: HOSPADM

## 2024-07-15 RX ORDER — ONDANSETRON HYDROCHLORIDE 2 MG/ML
4 INJECTION, SOLUTION INTRAVENOUS
Status: COMPLETED | OUTPATIENT
Start: 2024-07-15 | End: 2024-07-15

## 2024-07-15 RX ORDER — BISMUTH SUBSALICYLATE 525 MG/30ML
30 LIQUID ORAL EVERY 4 HOURS PRN
Status: DISCONTINUED | OUTPATIENT
Start: 2024-07-15 | End: 2024-07-17 | Stop reason: HOSPADM

## 2024-07-15 RX ORDER — NALOXONE HCL 0.4 MG/ML
0.02 VIAL (ML) INJECTION
Status: DISCONTINUED | OUTPATIENT
Start: 2024-07-15 | End: 2024-07-17 | Stop reason: HOSPADM

## 2024-07-15 RX ORDER — SODIUM,POTASSIUM PHOSPHATES 280-250MG
2 POWDER IN PACKET (EA) ORAL
Status: DISCONTINUED | OUTPATIENT
Start: 2024-07-15 | End: 2024-07-17 | Stop reason: HOSPADM

## 2024-07-15 RX ORDER — IBUPROFEN 200 MG
16 TABLET ORAL
Status: DISCONTINUED | OUTPATIENT
Start: 2024-07-15 | End: 2024-07-17 | Stop reason: HOSPADM

## 2024-07-15 RX ORDER — AMOXICILLIN 250 MG
1 CAPSULE ORAL 2 TIMES DAILY PRN
Status: DISCONTINUED | OUTPATIENT
Start: 2024-07-15 | End: 2024-07-17 | Stop reason: HOSPADM

## 2024-07-15 RX ORDER — ZOLPIDEM TARTRATE 5 MG/1
10 TABLET ORAL NIGHTLY PRN
Status: DISCONTINUED | OUTPATIENT
Start: 2024-07-15 | End: 2024-07-17 | Stop reason: HOSPADM

## 2024-07-15 RX ORDER — METRONIDAZOLE 500 MG/100ML
500 INJECTION, SOLUTION INTRAVENOUS
Status: COMPLETED | OUTPATIENT
Start: 2024-07-15 | End: 2024-07-15

## 2024-07-15 RX ORDER — LANOLIN ALCOHOL/MO/W.PET/CERES
800 CREAM (GRAM) TOPICAL
Status: DISCONTINUED | OUTPATIENT
Start: 2024-07-15 | End: 2024-07-17 | Stop reason: HOSPADM

## 2024-07-15 RX ORDER — GLUCAGON 1 MG
1 KIT INJECTION
Status: DISCONTINUED | OUTPATIENT
Start: 2024-07-15 | End: 2024-07-17 | Stop reason: HOSPADM

## 2024-07-15 RX ORDER — LORAZEPAM 0.5 MG/1
0.5 TABLET ORAL EVERY 12 HOURS PRN
Status: DISCONTINUED | OUTPATIENT
Start: 2024-07-15 | End: 2024-07-17 | Stop reason: HOSPADM

## 2024-07-15 RX ORDER — BUSPIRONE HYDROCHLORIDE 5 MG/1
10 TABLET ORAL 3 TIMES DAILY
Status: DISCONTINUED | OUTPATIENT
Start: 2024-07-15 | End: 2024-07-17 | Stop reason: HOSPADM

## 2024-07-15 RX ORDER — LEVOFLOXACIN 5 MG/ML
750 INJECTION, SOLUTION INTRAVENOUS
Status: COMPLETED | OUTPATIENT
Start: 2024-07-15 | End: 2024-07-15

## 2024-07-15 RX ORDER — HYOSCYAMINE SULFATE 0.12 MG/1
0.25 TABLET SUBLINGUAL 3 TIMES DAILY
Status: DISCONTINUED | OUTPATIENT
Start: 2024-07-15 | End: 2024-07-17 | Stop reason: HOSPADM

## 2024-07-15 RX ORDER — METRONIDAZOLE 500 MG/100ML
500 INJECTION, SOLUTION INTRAVENOUS
Status: DISCONTINUED | OUTPATIENT
Start: 2024-07-16 | End: 2024-07-16

## 2024-07-15 RX ORDER — ONDANSETRON HYDROCHLORIDE 2 MG/ML
4 INJECTION, SOLUTION INTRAVENOUS EVERY 6 HOURS PRN
Status: DISCONTINUED | OUTPATIENT
Start: 2024-07-15 | End: 2024-07-17 | Stop reason: HOSPADM

## 2024-07-15 RX ORDER — MAGNESIUM SULFATE HEPTAHYDRATE 40 MG/ML
2 INJECTION, SOLUTION INTRAVENOUS ONCE
Status: COMPLETED | OUTPATIENT
Start: 2024-07-15 | End: 2024-07-16

## 2024-07-15 RX ORDER — MORPHINE SULFATE 2 MG/ML
2 INJECTION, SOLUTION INTRAMUSCULAR; INTRAVENOUS
Status: COMPLETED | OUTPATIENT
Start: 2024-07-15 | End: 2024-07-15

## 2024-07-15 RX ORDER — ACETAMINOPHEN 325 MG/1
650 TABLET ORAL EVERY 4 HOURS PRN
Status: DISCONTINUED | OUTPATIENT
Start: 2024-07-15 | End: 2024-07-17 | Stop reason: HOSPADM

## 2024-07-15 RX ORDER — ENOXAPARIN SODIUM 100 MG/ML
40 INJECTION SUBCUTANEOUS EVERY 12 HOURS
Status: DISCONTINUED | OUTPATIENT
Start: 2024-07-15 | End: 2024-07-17 | Stop reason: HOSPADM

## 2024-07-15 RX ORDER — SODIUM CHLORIDE 0.9 % (FLUSH) 0.9 %
10 SYRINGE (ML) INJECTION EVERY 12 HOURS PRN
Status: DISCONTINUED | OUTPATIENT
Start: 2024-07-15 | End: 2024-07-17 | Stop reason: HOSPADM

## 2024-07-15 RX ORDER — CHOLESTYRAMINE 4 G/4.8G
1 POWDER, FOR SUSPENSION ORAL DAILY
Status: DISCONTINUED | OUTPATIENT
Start: 2024-07-16 | End: 2024-07-17 | Stop reason: HOSPADM

## 2024-07-15 RX ORDER — IBUPROFEN 200 MG
24 TABLET ORAL
Status: DISCONTINUED | OUTPATIENT
Start: 2024-07-15 | End: 2024-07-17 | Stop reason: HOSPADM

## 2024-07-15 RX ADMIN — LEVOFLOXACIN 750 MG: 750 INJECTION, SOLUTION INTRAVENOUS at 10:07

## 2024-07-15 RX ADMIN — ONDANSETRON 4 MG: 2 INJECTION INTRAMUSCULAR; INTRAVENOUS at 09:07

## 2024-07-15 RX ADMIN — MORPHINE SULFATE 2 MG: 2 INJECTION, SOLUTION INTRAMUSCULAR; INTRAVENOUS at 09:07

## 2024-07-15 RX ADMIN — METRONIDAZOLE 500 MG: 5 INJECTION, SOLUTION INTRAVENOUS at 09:07

## 2024-07-15 RX ADMIN — IOHEXOL 100 ML: 350 INJECTION, SOLUTION INTRAVENOUS at 08:07

## 2024-07-15 RX ADMIN — SODIUM CHLORIDE 1000 ML: 9 INJECTION, SOLUTION INTRAVENOUS at 07:07

## 2024-07-15 NOTE — FIRST PROVIDER EVALUATION
" Emergency Department TeleTriage Encounter Note      CHIEF COMPLAINT    Chief Complaint   Patient presents with    Abdominal Pain    Diarrhea     LUQ abd pain with diarrhea. Hx of UC       VITAL SIGNS   Initial Vitals [07/15/24 1640]   BP Pulse Resp Temp SpO2   (!) 170/100 106 20 98.5 °F (36.9 °C) 99 %      MAP       --            ALLERGIES    Review of patient's allergies indicates:  No Known Allergies    PROVIDER TRIAGE NOTE  Verbal consent for the teletriage evaluation was given by the patient at the start of the evaluation.  All efforts will be made to maintain patient's privacy during the evaluation.      This is a teletriage evaluation of a 49 y.o. male presenting to the ED with c/o diarrhea, nausea, and LUQ.epigastric pain that started 2 weeks ago. H/O UC.  Limited physical exam via telehealth: The patient is awake, alert, answering questions appropriately and is not in respiratory distress.  As the Teletriage provider, I performed an initial assessment and ordered appropriate labs and imaging studies, if any, to facilitate the patient's care once placed in the ED. Once a room is available, care and a full evaluation will be completed by an alternate ED provider.  Any additional orders and the final disposition will be determined by that provider.  All imaging and labs will not be followed-up by the Teletriage Team, including myself.          ORDERS  Labs Reviewed   CLOSTRIDIUM DIFFICILE   CULTURE, STOOL   CBC W/ AUTO DIFFERENTIAL   COMPREHENSIVE METABOLIC PANEL   URINALYSIS, REFLEX TO URINE CULTURE       ED Orders (720h ago, onward)      Start Ordered     Status Ordering Provider    07/16/24 1650 07/15/24 1649  Discontinue CDiff after 24hours if specimen not collected  24 Hours from now        Placed in "And" Linked Group    Ordered STEVE CORCORAN    07/15/24 1650 07/15/24 1649  Vital signs  Every 2 hours         Ordered STEVE CORCORAN    07/15/24 1650 07/15/24 1649  Clostridium difficile EIA  Once      " "  Placed in "And" Linked Group    Ordered STEVE COROCRAN    07/15/24 1650 07/15/24 1649  Special contact c diff isolation status  Continuous        Placed in "And" Linked Group    Ordered STEVE CORCORAN    07/15/24 1650 07/15/24 1649  Stool culture  Once         Ordered STEVE CORCORAN    07/15/24 1649 07/15/24 1649  Diet NPO  Diet effective now         Ordered STEVE CORCORAN    07/15/24 1649 07/15/24 1649  Insert peripheral IV  Once         Ordered STEVE CORCORAN    07/15/24 1649 07/15/24 1649  CBC W/ AUTO DIFFERENTIAL  STAT         Ordered STEVE CORCORAN    07/15/24 1649 07/15/24 1649  Comp. Metabolic Panel  STAT         Ordered STEVE CORCORAN    07/15/24 1649 07/15/24 1649  Urinalysis, Reflex to Urine Culture Urine, Clean Catch  STAT         Ordered STEVE CORCORAN              Virtual Visit Note: The provider triage portion of this emergency department evaluation and documentation was performed via [employer], a HIPAA-compliant telemedicine application, in concert with a tele-presenter in the room. A face to face patient evaluation with one of my colleagues will occur once the patient is placed in an emergency department room.      DISCLAIMER: This note was prepared with [employer] voice recognition transcription software. Garbled syntax, mangled pronouns, and other bizarre constructions may be attributed to that software system.    "

## 2024-07-16 ENCOUNTER — ANESTHESIA EVENT (OUTPATIENT)
Dept: SURGERY | Facility: HOSPITAL | Age: 49
DRG: 386 | End: 2024-07-16
Payer: COMMERCIAL

## 2024-07-16 ENCOUNTER — ANESTHESIA (OUTPATIENT)
Dept: SURGERY | Facility: HOSPITAL | Age: 49
DRG: 386 | End: 2024-07-16
Payer: COMMERCIAL

## 2024-07-16 VITALS
HEART RATE: 79 BPM | RESPIRATION RATE: 9 BRPM | SYSTOLIC BLOOD PRESSURE: 144 MMHG | DIASTOLIC BLOOD PRESSURE: 66 MMHG | OXYGEN SATURATION: 100 %

## 2024-07-16 PROBLEM — R00.2 PALPITATIONS: Status: RESOLVED | Noted: 2022-09-21 | Resolved: 2024-07-16

## 2024-07-16 PROBLEM — E78.5 HYPERLIPEMIA: Status: RESOLVED | Noted: 2022-01-29 | Resolved: 2024-07-16

## 2024-07-16 PROBLEM — D72.829 LEUKOCYTOSIS: Status: RESOLVED | Noted: 2023-01-11 | Resolved: 2024-07-16

## 2024-07-16 PROBLEM — Z87.891 FORMER SMOKER: Status: RESOLVED | Noted: 2023-11-30 | Resolved: 2024-07-16

## 2024-07-16 PROBLEM — B44.9 ASPERGILLOSIS: Status: RESOLVED | Noted: 2023-12-02 | Resolved: 2024-07-16

## 2024-07-16 PROBLEM — A04.72 C. DIFFICILE DIARRHEA: Status: RESOLVED | Noted: 2023-02-20 | Resolved: 2024-07-16

## 2024-07-16 PROBLEM — R07.9 CHEST PAIN: Status: RESOLVED | Noted: 2023-11-17 | Resolved: 2024-07-16

## 2024-07-16 PROBLEM — F41.9 ANXIETY: Status: RESOLVED | Noted: 2022-09-21 | Resolved: 2024-07-16

## 2024-07-16 PROBLEM — E53.8 VITAMIN B12 DEFICIENCY: Status: RESOLVED | Noted: 2024-05-19 | Resolved: 2024-07-16

## 2024-07-16 PROBLEM — R53.1 LEFT-SIDED WEAKNESS: Status: RESOLVED | Noted: 2023-05-01 | Resolved: 2024-07-16

## 2024-07-16 PROBLEM — K02.9 DENTAL CARIES: Chronic | Status: RESOLVED | Noted: 2023-04-20 | Resolved: 2024-07-16

## 2024-07-16 PROBLEM — R19.5 POSITIVE COLORECTAL CANCER SCREENING USING COLOGUARD TEST: Status: RESOLVED | Noted: 2022-03-25 | Resolved: 2024-07-16

## 2024-07-16 LAB
ALBUMIN SERPL BCP-MCNC: 3.3 G/DL (ref 3.5–5.2)
ALP SERPL-CCNC: 61 U/L (ref 55–135)
ALT SERPL W/O P-5'-P-CCNC: 32 U/L (ref 10–44)
ANION GAP SERPL CALC-SCNC: 6 MMOL/L (ref 8–16)
AST SERPL-CCNC: 12 U/L (ref 10–40)
BASOPHILS # BLD AUTO: 0.05 K/UL (ref 0–0.2)
BASOPHILS NFR BLD: 0.4 % (ref 0–1.9)
BILIRUB SERPL-MCNC: 0.3 MG/DL (ref 0.1–1)
BUN SERPL-MCNC: 9 MG/DL (ref 6–20)
CALCIUM SERPL-MCNC: 8.3 MG/DL (ref 8.7–10.5)
CHLORIDE SERPL-SCNC: 107 MMOL/L (ref 95–110)
CO2 SERPL-SCNC: 25 MMOL/L (ref 23–29)
CREAT SERPL-MCNC: 0.8 MG/DL (ref 0.5–1.4)
DIFFERENTIAL METHOD BLD: ABNORMAL
EOSINOPHIL # BLD AUTO: 0.2 K/UL (ref 0–0.5)
EOSINOPHIL NFR BLD: 1.2 % (ref 0–8)
ERYTHROCYTE [DISTWIDTH] IN BLOOD BY AUTOMATED COUNT: 16.7 % (ref 11.5–14.5)
EST. GFR  (NO RACE VARIABLE): >60 ML/MIN/1.73 M^2
GLUCOSE SERPL-MCNC: 104 MG/DL (ref 70–110)
GLUCOSE SERPL-MCNC: 73 MG/DL (ref 70–110)
GLUCOSE SERPL-MCNC: 87 MG/DL (ref 70–110)
GLUCOSE SERPL-MCNC: 97 MG/DL (ref 70–110)
HCT VFR BLD AUTO: 34.4 % (ref 40–54)
HGB BLD-MCNC: 10.7 G/DL (ref 14–18)
IMM GRANULOCYTES # BLD AUTO: 0.21 K/UL (ref 0–0.04)
IMM GRANULOCYTES NFR BLD AUTO: 1.5 % (ref 0–0.5)
LYMPHOCYTES # BLD AUTO: 2.2 K/UL (ref 1–4.8)
LYMPHOCYTES NFR BLD: 15.3 % (ref 18–48)
MAGNESIUM SERPL-MCNC: 2 MG/DL (ref 1.6–2.6)
MCH RBC QN AUTO: 30.6 PG (ref 27–31)
MCHC RBC AUTO-ENTMCNC: 31.1 G/DL (ref 32–36)
MCV RBC AUTO: 98 FL (ref 82–98)
MONOCYTES # BLD AUTO: 1.1 K/UL (ref 0.3–1)
MONOCYTES NFR BLD: 7.4 % (ref 4–15)
NEUTROPHILS # BLD AUTO: 10.6 K/UL (ref 1.8–7.7)
NEUTROPHILS NFR BLD: 74.2 % (ref 38–73)
NRBC BLD-RTO: 0 /100 WBC
PHOSPHATE SERPL-MCNC: 3 MG/DL (ref 2.7–4.5)
PLATELET # BLD AUTO: 180 K/UL (ref 150–450)
PMV BLD AUTO: 10.1 FL (ref 9.2–12.9)
POTASSIUM SERPL-SCNC: 3.9 MMOL/L (ref 3.5–5.1)
PROT SERPL-MCNC: 5.8 G/DL (ref 6–8.4)
RBC # BLD AUTO: 3.5 M/UL (ref 4.6–6.2)
SODIUM SERPL-SCNC: 138 MMOL/L (ref 136–145)
WBC # BLD AUTO: 14.25 K/UL (ref 3.9–12.7)

## 2024-07-16 PROCEDURE — 80053 COMPREHEN METABOLIC PANEL: CPT | Performed by: PHYSICAL THERAPY ASSISTANT

## 2024-07-16 PROCEDURE — 25000003 PHARM REV CODE 250: Performed by: HOSPITALIST

## 2024-07-16 PROCEDURE — 84100 ASSAY OF PHOSPHORUS: CPT | Performed by: PHYSICAL THERAPY ASSISTANT

## 2024-07-16 PROCEDURE — 63600175 PHARM REV CODE 636 W HCPCS: Mod: JZ,JG | Performed by: PHYSICAL THERAPY ASSISTANT

## 2024-07-16 PROCEDURE — 63600175 PHARM REV CODE 636 W HCPCS: Performed by: NURSE ANESTHETIST, CERTIFIED REGISTERED

## 2024-07-16 PROCEDURE — 25000003 PHARM REV CODE 250: Performed by: INTERNAL MEDICINE

## 2024-07-16 PROCEDURE — 0DBM8ZX EXCISION OF DESCENDING COLON, VIA NATURAL OR ARTIFICIAL OPENING ENDOSCOPIC, DIAGNOSTIC: ICD-10-PCS | Performed by: INTERNAL MEDICINE

## 2024-07-16 PROCEDURE — 37000009 HC ANESTHESIA EA ADD 15 MINS: Performed by: INTERNAL MEDICINE

## 2024-07-16 PROCEDURE — 36415 COLL VENOUS BLD VENIPUNCTURE: CPT | Performed by: PHYSICAL THERAPY ASSISTANT

## 2024-07-16 PROCEDURE — 37000008 HC ANESTHESIA 1ST 15 MINUTES: Performed by: INTERNAL MEDICINE

## 2024-07-16 PROCEDURE — D9220A PRA ANESTHESIA: Mod: CRNA,,, | Performed by: NURSE ANESTHETIST, CERTIFIED REGISTERED

## 2024-07-16 PROCEDURE — 25000003 PHARM REV CODE 250: Performed by: NURSE ANESTHETIST, CERTIFIED REGISTERED

## 2024-07-16 PROCEDURE — 85025 COMPLETE CBC W/AUTO DIFF WBC: CPT | Performed by: PHYSICAL THERAPY ASSISTANT

## 2024-07-16 PROCEDURE — 63600175 PHARM REV CODE 636 W HCPCS: Performed by: EMERGENCY MEDICINE

## 2024-07-16 PROCEDURE — 0DBP8ZX EXCISION OF RECTUM, VIA NATURAL OR ARTIFICIAL OPENING ENDOSCOPIC, DIAGNOSTIC: ICD-10-PCS | Performed by: INTERNAL MEDICINE

## 2024-07-16 PROCEDURE — 25000003 PHARM REV CODE 250: Performed by: PHYSICAL THERAPY ASSISTANT

## 2024-07-16 PROCEDURE — 0DBL8ZX EXCISION OF TRANSVERSE COLON, VIA NATURAL OR ARTIFICIAL OPENING ENDOSCOPIC, DIAGNOSTIC: ICD-10-PCS | Performed by: INTERNAL MEDICINE

## 2024-07-16 PROCEDURE — 45380 COLONOSCOPY AND BIOPSY: CPT | Performed by: INTERNAL MEDICINE

## 2024-07-16 PROCEDURE — 21400001 HC TELEMETRY ROOM

## 2024-07-16 PROCEDURE — 83735 ASSAY OF MAGNESIUM: CPT | Performed by: PHYSICAL THERAPY ASSISTANT

## 2024-07-16 PROCEDURE — D9220A PRA ANESTHESIA: Mod: ANES,,, | Performed by: ANESTHESIOLOGY

## 2024-07-16 PROCEDURE — 27200043 HC FORCEPS, BIOPSY: Performed by: INTERNAL MEDICINE

## 2024-07-16 PROCEDURE — 0DBK8ZX EXCISION OF ASCENDING COLON, VIA NATURAL OR ARTIFICIAL OPENING ENDOSCOPIC, DIAGNOSTIC: ICD-10-PCS | Performed by: INTERNAL MEDICINE

## 2024-07-16 PROCEDURE — 0DBN8ZX EXCISION OF SIGMOID COLON, VIA NATURAL OR ARTIFICIAL OPENING ENDOSCOPIC, DIAGNOSTIC: ICD-10-PCS | Performed by: INTERNAL MEDICINE

## 2024-07-16 RX ORDER — KETAMINE HYDROCHLORIDE 10 MG/ML
INJECTION, SOLUTION INTRAMUSCULAR; INTRAVENOUS
Status: DISCONTINUED | OUTPATIENT
Start: 2024-07-16 | End: 2024-07-16

## 2024-07-16 RX ORDER — HYDROCODONE BITARTRATE AND ACETAMINOPHEN 5; 325 MG/1; MG/1
2 TABLET ORAL EVERY 6 HOURS PRN
Status: DISCONTINUED | OUTPATIENT
Start: 2024-07-16 | End: 2024-07-17 | Stop reason: HOSPADM

## 2024-07-16 RX ORDER — PROPOFOL 10 MG/ML
VIAL (ML) INTRAVENOUS
Status: DISCONTINUED | OUTPATIENT
Start: 2024-07-16 | End: 2024-07-16

## 2024-07-16 RX ADMIN — SERTRALINE HYDROCHLORIDE 100 MG: 50 TABLET ORAL at 07:07

## 2024-07-16 RX ADMIN — ATORVASTATIN CALCIUM 10 MG: 10 TABLET, FILM COATED ORAL at 09:07

## 2024-07-16 RX ADMIN — METOPROLOL SUCCINATE 50 MG: 50 TABLET, FILM COATED, EXTENDED RELEASE ORAL at 07:07

## 2024-07-16 RX ADMIN — BUSPIRONE HYDROCHLORIDE 10 MG: 5 TABLET ORAL at 07:07

## 2024-07-16 RX ADMIN — HYDROCODONE BITARTRATE AND ACETAMINOPHEN 1 TABLET: 5; 325 TABLET ORAL at 07:07

## 2024-07-16 RX ADMIN — HYOSCYAMINE SULFATE 0.25 MG: 0.12 TABLET SUBLINGUAL at 02:07

## 2024-07-16 RX ADMIN — BUSPIRONE HYDROCHLORIDE 10 MG: 5 TABLET ORAL at 09:07

## 2024-07-16 RX ADMIN — BUSPIRONE HYDROCHLORIDE 10 MG: 5 TABLET ORAL at 12:07

## 2024-07-16 RX ADMIN — BUSPIRONE HYDROCHLORIDE 10 MG: 5 TABLET ORAL at 02:07

## 2024-07-16 RX ADMIN — ENOXAPARIN SODIUM 40 MG: 40 INJECTION SUBCUTANEOUS at 12:07

## 2024-07-16 RX ADMIN — PROPOFOL 80 MG: 10 INJECTION, EMULSION INTRAVENOUS at 01:07

## 2024-07-16 RX ADMIN — ZOLPIDEM TARTRATE 10 MG: 5 TABLET, COATED ORAL at 09:07

## 2024-07-16 RX ADMIN — HYOSCYAMINE SULFATE 0.25 MG: 0.12 TABLET SUBLINGUAL at 09:07

## 2024-07-16 RX ADMIN — HYDROCODONE BITARTRATE AND ACETAMINOPHEN 2 TABLET: 5; 325 TABLET ORAL at 09:07

## 2024-07-16 RX ADMIN — ZOLPIDEM TARTRATE 10 MG: 5 TABLET, COATED ORAL at 12:07

## 2024-07-16 RX ADMIN — BUDESONIDE 3 MG: 3 CAPSULE, COATED PELLETS ORAL at 07:07

## 2024-07-16 RX ADMIN — PROPOFOL 20 MG: 10 INJECTION, EMULSION INTRAVENOUS at 01:07

## 2024-07-16 RX ADMIN — KETAMINE HYDROCHLORIDE 10 MG: 10 INJECTION, SOLUTION INTRAMUSCULAR; INTRAVENOUS at 01:07

## 2024-07-16 RX ADMIN — CHOLESTYRAMINE LIGHT 4 G: 4 POWDER, FOR SUSPENSION ORAL at 07:07

## 2024-07-16 RX ADMIN — KETAMINE HYDROCHLORIDE 20 MG: 10 INJECTION, SOLUTION INTRAMUSCULAR; INTRAVENOUS at 01:07

## 2024-07-16 RX ADMIN — HYOSCYAMINE SULFATE 0.25 MG: 0.12 TABLET SUBLINGUAL at 07:07

## 2024-07-16 RX ADMIN — HYOSCYAMINE SULFATE 0.25 MG: 0.12 TABLET SUBLINGUAL at 12:07

## 2024-07-16 RX ADMIN — PROPOFOL 40 MG: 10 INJECTION, EMULSION INTRAVENOUS at 01:07

## 2024-07-16 RX ADMIN — RIFAXIMIN 550 MG: 550 TABLET ORAL at 09:07

## 2024-07-16 RX ADMIN — ENOXAPARIN SODIUM 40 MG: 40 INJECTION SUBCUTANEOUS at 09:07

## 2024-07-16 RX ADMIN — HYDROCODONE BITARTRATE AND ACETAMINOPHEN 1 TABLET: 5; 325 TABLET ORAL at 12:07

## 2024-07-16 RX ADMIN — METRONIDAZOLE 500 MG: 5 INJECTION, SOLUTION INTRAVENOUS at 04:07

## 2024-07-16 RX ADMIN — RIFAXIMIN 550 MG: 550 TABLET ORAL at 02:07

## 2024-07-16 RX ADMIN — SODIUM CHLORIDE: 0.9 INJECTION, SOLUTION INTRAVENOUS at 01:07

## 2024-07-16 RX ADMIN — HYDROCODONE BITARTRATE AND ACETAMINOPHEN 2 TABLET: 5; 325 TABLET ORAL at 02:07

## 2024-07-16 RX ADMIN — ACETAMINOPHEN 650 MG: 325 TABLET ORAL at 11:07

## 2024-07-16 RX ADMIN — MAGNESIUM SULFATE 2 G: 2 INJECTION INTRAVENOUS at 12:07

## 2024-07-16 RX ADMIN — ENOXAPARIN SODIUM 40 MG: 40 INJECTION SUBCUTANEOUS at 08:07

## 2024-07-16 NOTE — NURSING
Nurses Note -- 4 Eyes      7/16/2024   1:19 AM      Skin assessed during: Admit      [x] No Altered Skin Integrity Present    []Prevention Measures Documented      [] Yes- Altered Skin Integrity Present or Discovered   [] LDA Added if Not in Epic (Describe Wound)   [] New Altered Skin Integrity was Present on Admit and Documented in LDA   [] Wound Image Taken    Wound Care Consulted? No    Attending Nurse:  Anitra Wang RN/Staff Member:  [id number]

## 2024-07-16 NOTE — PLAN OF CARE
Problem: Adult Inpatient Plan of Care  Goal: Plan of Care Review  Outcome: Progressing  Goal: Patient-Specific Goal (Individualized)  Outcome: Progressing  Goal: Absence of Hospital-Acquired Illness or Injury  Outcome: Progressing  Goal: Optimal Comfort and Wellbeing  Outcome: Progressing  Goal: Readiness for Transition of Care  Outcome: Progressing     Problem: Bariatric Environmental Safety  Goal: Safety Maintained with Care  Outcome: Progressing     Problem: Diabetes Comorbidity  Goal: Blood Glucose Level Within Targeted Range  Outcome: Progressing     Problem: Infection  Goal: Absence of Infection Signs and Symptoms  Outcome: Progressing     Problem: Wound  Goal: Optimal Coping  Outcome: Progressing  Goal: Optimal Functional Ability  Outcome: Progressing  Goal: Absence of Infection Signs and Symptoms  Outcome: Progressing  Goal: Improved Oral Intake  Outcome: Progressing  Goal: Optimal Pain Control and Function  Outcome: Progressing  Goal: Skin Health and Integrity  Outcome: Progressing  Goal: Optimal Wound Healing  Outcome: Progressing

## 2024-07-16 NOTE — CONSULTS
GASTROENTEROLOGY INPATIENT CONSULT NOTE  Patient Name: Jacoby Jean-Baptiste  Patient MRN: [id number]  Patient : 1975    Admit Date: 7/15/2024  Service date: 2024    Reason for Consult: diarrhea    PCP: Hunter Aguilar III, MD    Chief Complaint   Patient presents with    Abdominal Pain    Diarrhea     LUQ abd pain with diarrhea. Hx of UC       HPI: Patient is a 49 y.o. male with PMHx 5HLD, DM on ozempic, HTN, LOLIS, umbilical hernia repair, tobacco use, C. diff s/p Vanco / dificid / FMT / rebyota presents for evaluation of  diarrhea. Acute/chronic onset, intermittent, progressive since stopping remicade. Was having 10+ BM daily but went down to 1-2 BM daily w/ intermittent bleeding while on remicade . Got fecal xpolant in 3/'23. Remicade stopped due to cavitary fungal lesions likely from immunosuppression from remicade. Started on Entyvio in  but seen again in hospital in early  & again in 3/'24 w/ severe disease. Omvoq started late  after insurance fight.  Had 3rd dose of loading Omvoq on 24. Continues to have diarrhea but no bleeding.     CHART REVIEW:   CT ABd   C. Diff  - negative  EGD/colon  - small HH; ampullary tic; mild ileitis (TI nml previously.?backwash?), L sided colitis  -reflux esophagitis. focal intestinal metaplasia antrum. Mild ileitis; sigmoid/proctitis; findings c/w UC; CMV negative in L sided biopsies  Labs  -  Calprotectin 2480 on Omvoh at around 2 months. (Was >8K on entyvio but had near normalized on remicade); Negative Stool PCR  Hospitalization  - colitis on CT scan; Neg C. diff   Hospitalization 3/'24 - Calpro >8K; flex w/ severe L sided UC; placed on steroids. C. diff neg;....d/c entyvio  Hospitalization ; CT  - fatty liver; L sided colitis; Calpro back up ot 3070 (201 on remicade); Neg C. diff  Entyvio started   Hospitalization  - cavitary fungal lesions  Labs 10/'23 - C.diff negative after vanco and rebyota  enemas.  Labs 7/'23 -  Calprotectin 201 on remicade w/ C. diff...tx w/ vanco w/ long taper +/- rebyota; Nml CRP much better on remicade  EGD 4/'23 - candidate esophagitis / duodenitis; HP neg gastirits  Hospitalization 4/'23 - colitis on CT; C. diff neg; Remicade loaded  Colon 3/'23 - Pan-colitis; Nml TI; s/p FMT for refractory C. diff  C. DIff + 3/'23  Labs 1/'23 - CRP 21; Stool PCR + C. DIff....  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     Past Medical History:  Past Medical History:   Diagnosis Date    C. difficile colitis     Cavitary pneumonia 11/2023    pos aspergillus serology    Diabetes mellitus 01/2022    Hyperlipidemia 2015    Hypertension 2015    Insomnia 2015    Ulcerative colitis         Past Surgical History:  Past Surgical History:   Procedure Laterality Date    BRONCHOSCOPY WITH FLUOROSCOPY Left 11/20/2023    Procedure: BRONCHOSCOPY, WITH FLUOROSCOPY;  Surgeon: Lisa Villarreal MD;  Location: [facility];  Service: Pulmonary;  Laterality: Left;    BRONCHOSCOPY WITH FLUOROSCOPY N/A 11/30/2023    Procedure: BRONCHOSCOPY, WITH FLUOROSCOPY;  Surgeon: Lisa Villarreal MD;  Location: [facility];  Service: Pulmonary;  Laterality: N/A;    CARDIAC ELECTROPHYSIOLOGY MAPPING AND ABLATION  2017    SVT    CHOLECYSTECTOMY  2011    COLONOSCOPY N/A 5/3/2022    Procedure: COLONOSCOPY;  Surgeon: Shan Jean III, MD;  Location: [facility];  Service: Endoscopy;  Laterality: N/A;    COLONOSCOPY N/A 3/16/2024    Procedure: COLONOSCOPY;  Surgeon: ALEKSANDER Ramos MD;  Location: [facility];  Service: Endoscopy;  Laterality: N/A;    COLONOSCOPY WITH FECAL MICROBIOTA TRANSFER N/A 3/21/2023    Procedure: COLONOSCOPY, WITH FECAL MICROBIOTA TRANSFER;  Surgeon: David Millan MD;  [facility];  Service: Endoscopy;  Laterality: N/A;    ESOPHAGOGASTRODUODENOSCOPY N/A 4/24/2023    Procedure:  EGD (ESOPHAGOGASTRODUODENOSCOPY);  Surgeon: Shan Jean III, MD;  Location: [facility];  Service: Endoscopy;  Laterality: N/A;    ESOPHAGOGASTRODUODENOSCOPY N/A 6/5/2024    Procedure: EGD (ESOPHAGOGASTRODUODENOSCOPY);  Surgeon: Odalis Mccabe MD;  Location: [facility];  Service: Endoscopy;  Laterality: N/A;    FLEXIBLE SIGMOIDOSCOPY N/A 6/5/2024    Procedure: SIGMOIDOSCOPY, FLEXIBLE;  Surgeon: Odalis Mccabe MD;  Location: [facility];  Service: Endoscopy;  Laterality: N/A;    GANGLION CYST EXCISION Left 1992    UMBILICAL HERNIA REPAIR  2011    with mesh        Home Medications:  Medications Prior to Admission   Medication Sig Dispense Refill Last Dose    budesonide (ENTOCORT EC) 3 mg capsule Take 3 mg by mouth once daily.   7/15/2024    busPIRone (BUSPAR) 10 MG tablet Take 1 tablet (10 mg total) by mouth 3 (three) times daily. 270 tablet 1 7/15/2024    cholestyramine (QUESTRAN) 4 gram packet Take 1 packet by mouth once daily.   7/15/2024    hyoscyamine (LEVBID) 0.375 mg Tb12 Take 375 mcg by mouth 2 (two) times daily.   7/15/2024    LORazepam (ATIVAN) 0.5 MG tablet Take 1 tablet (0.5 mg total) by mouth every 12 (twelve) hours as needed for Anxiety. 60 tablet 2 7/15/2024    metFORMIN (GLUCOPHAGE-XR) 500 MG ER 24hr tablet Take 1 tablet (500 mg total) by mouth 2 (two) times daily with meals. 180 tablet 1 7/15/2024    metoprolol succinate (TOPROL-XL) 50 MG 24 hr tablet Take 1 tablet (50 mg total) by mouth once daily. 90 tablet 1 7/15/2024    sertraline (ZOLOFT) 100 MG tablet Take 1 tablet (100 mg total) by mouth once daily. 90 tablet 1 7/15/2024    simvastatin (ZOCOR) 20 MG tablet Take 1 tablet (20 mg total) by mouth every evening. 90 tablet 1 7/15/2024    triamcinolone acetonide 0.1% (KENALOG) 0.1 % cream Apply topically 2 (two) times daily. (Patient taking differently: Apply 1 g topically 2 (two) times daily.) 80 g 0 Past Month    zolpidem (AMBIEN) 10 mg Tab Take 1 tablet (10 mg total) by mouth nightly as  needed (insomnia). 30 tablet 2 7/14/2024    ergocalciferol (VITAMIN D2) 50,000 unit Cap Take 1 capsule (50,000 Units total) by mouth every 7 days. 12 capsule 1 7/12/2024    lipase-protease-amylase 12,000-38,000-60,000 units (CREON) CpDR Take 3 capsules by mouth 3 (three) times daily with meals. (Patient not taking: Reported on 7/15/2024) 270 capsule 11 Not Taking    semaglutide (OZEMPIC) 2 mg/dose (8 mg/3 mL) PnIj Inject 2 mg into the skin every 7 days. (Patient taking differently: Inject 2 mg into the skin every 7 days. SATURDAYS) 3 mL 5 7/6/2024       Inpatient Medications:   atorvastatin  10 mg Oral Daily    budesonide  3 mg Oral Daily    busPIRone  10 mg Oral TID    cholestyramine-aspartame  1 packet Oral Daily    enoxparin  40 mg Subcutaneous Q12H    hyoscyamine  0.25 mg Sublingual TID    metoprolol succinate  50 mg Oral Daily    sertraline  100 mg Oral Daily       Current Facility-Administered Medications:     acetaminophen, 650 mg, Oral, Q4H PRN    bismuth subsalicylate, 30 mL, Oral, Q4H PRN    dextrose 50%, 12.5 g, Intravenous, PRN    dextrose 50%, 25 g, Intravenous, PRN    glucagon (human recombinant), 1 mg, Intramuscular, PRN    glucose, 16 g, Oral, PRN    glucose, 24 g, Oral, PRN    HYDROcodone-acetaminophen, 1 tablet, Oral, Q6H PRN    HYDROcodone-acetaminophen, 2 tablet, Oral, Q6H PRN    insulin aspart U-100, 0-5 Units, Subcutaneous, QID (AC + HS) PRN    LORazepam, 0.5 mg, Oral, Q12H PRN    magnesium oxide, 800 mg, Oral, PRN    magnesium oxide, 800 mg, Oral, PRN    naloxone, 0.02 mg, Intravenous, PRN    ondansetron, 4 mg, Intravenous, Q6H PRN    potassium bicarbonate, 35 mEq, Oral, PRN    potassium bicarbonate, 50 mEq, Oral, PRN    potassium bicarbonate, 60 mEq, Oral, PRN    potassium, sodium phosphates, 2 packet, Oral, PRN    potassium, sodium phosphates, 2 packet, Oral, PRN    potassium, sodium phosphates, 2 packet, Oral, PRN    senna-docusate 8.6-50 mg, 1 tablet, Oral, BID PRN    sodium chloride 0.9%,  "10 mL, Intravenous, Q12H PRN    zolpidem, 10 mg, Oral, Nightly PRN    Review of patient's allergies indicates:  No Known Allergies    Social History:   Social History     Occupational History    Not on file   Tobacco Use    Smoking status: Former     Current packs/day: 1.00     Average packs/day: 1 pack/day for 31.5 years (31.5 ttl pk-yrs)     Types: Cigarettes     Start date: 1993    Smokeless tobacco: Never    Tobacco comments:     Pt states he has stopped smoking with Chantix three weeks ago. 12/1/23   Substance and Sexual Activity    Alcohol use: Yes     Comment: ocass    Drug use: Not Currently    Sexual activity: Not Currently       Family History:   Family History   Problem Relation Name Age of Onset    Asthma Mother         Review of Systems:  A 10 point review of systems was performed and was normal, except as mentioned in the HPI, including constitutional, HEENT, heme, lymph, cardiovascular, respiratory, gastrointestinal, genitourinary, neurologic, endocrine, psychiatric and musculoskeletal.      OBJECTIVE:    Physical Exam:  24 Hour Vital Sign Ranges: Temp:  [97.9 °F (36.6 °C)-98.6 °F (37 °C)] 98.1 °F (36.7 °C)  Pulse:  [] 75  Resp:  [16-20] 16  SpO2:  [96 %-99 %] 97 %  BP: (108-170)/() 137/69  Most recent vitals: /69   Pulse 75   Temp 98.1 °F (36.7 °C) (Oral)   Resp 16   Ht 5' 5" (1.651 m)   Wt 129 kg (284 lb 6.4 oz)   SpO2 97%   BMI 47.33 kg/m²    GEN: well-developed, well-nourished, awake and alert, non-toxic appearing adult  HEENT: PERRL, sclera anicteric, oral mucosa pink and moist without lesion  NECK: trachea midline; Good ROM  CV: regular rate and rhythm, no murmurs or gallops  RESP: clear to auscultation bilaterally, no wheezes, rhonci or rales  ABD: soft, non-tender, non-distended, normal bowel sounds  EXT: no swelling or edema, 2+ pulses distally  SKIN: no rashes or jaundice  PSYCH: normal affect    Labs:   Recent Labs     07/15/24  1737 07/16/24  0449   WBC 16.73* " "14.25*   * 98    180     Recent Labs     07/15/24  1737 07/16/24  0449    138   K 5.0 3.9    107   CO2 26 25   BUN 11 9   GLU 93 87     No results for input(s): "ALB" in the last 72 hours.    Invalid input(s): "ALKP", "SGOT", "SGPT", "TBIL", "DBIL", "TPRO"  No results for input(s): "PT", "INR", "PTT" in the last 72 hours.      Radiology Review:  CT Abdomen Pelvis With IV Contrast Routine Oral Contrast   Final Result      Continued mild bowel wall thickening and inflammatory change predominantly involving the sigmoid colon, similar in extent to prior study of 07/06/2024.  This may reflect a nonspecific colitis.  No evidence of abscess or free intraperitoneal air.      Redemonstration of a 0.4 cm calculus at the right ureteropelvic junction, similar to prior study.  No significant hydronephrosis appreciated.      Bilateral nonobstructing nephrolithiasis.      Hepatomegaly.  Cholecystectomy.      Additional findings as above.         Electronically signed by: Kirk Dooley MD   Date:    07/15/2024   Time:    20:51            IMPRESSION / RECOMMENDATIONS:  49 y.o. male with PMHx 5HLD, DM on ozempic, HTN, LOLIS, umbilical hernia repair, tobacco use, C. diff s/p Vanco / dificid / FMT / rebyota presents for evaluation of  diarrhea. Flex vs colon today to evaluate as should see response to Omvoq by now. RIsks, benefits, alternatives discussed in detail regarding upcoming procedures and sedation and possible complications. Some of the more common endoscopic complications include but not limited to immediate or delayed perforation, bleeding, infections, pain, inadvertent injury to surrounding tissue / organs and possible need for surgical evaluation. All questions answered and will proceed with procedure as planned.     -Flex vs colon today w/ bx    Thank you for this consult.    Shan Jean III  7/16/2024  1:17 PM        "

## 2024-07-16 NOTE — TRANSFER OF CARE
"Anesthesia Transfer of Care Note    Patient: Jacoby Jean-Baptiste    Procedure(s) Performed: Procedure(s) (LRB):  SIGMOIDOSCOPY, FLEXIBLE (N/A)    Patient location: GI    Anesthesia Type: general    Transport from OR: Transported from OR on room air with adequate spontaneous ventilation    Post pain: adequate analgesia    Post assessment: no apparent anesthetic complications and tolerated procedure well    Post vital signs: stable    Level of consciousness: awake    Nausea/Vomiting: no nausea/vomiting    Complications: none    Transfer of care protocol was followed      Last vitals: Visit Vitals  BP (!) 155/85 (BP Location: Left arm, Patient Position: Lying)   Pulse 78   Temp 36.5 °C (97.7 °F) (Tympanic)   Resp 18   Ht 5' 5" (1.651 m)   Wt 129 kg (284 lb 6.4 oz)   SpO2 (!) 94%   BMI 47.33 kg/m²     "

## 2024-07-16 NOTE — PHARMACY MED REC
"              .        Admission Medication History     The home medication history was taken by Yu Holloway.    You may go to "Admission" then "Reconcile Home Medications" tabs to review and/or act upon these items.     The home medication list has been updated by the Pharmacy department.   Please read ALL comments highlighted in yellow.   Please address this information as you see fit.    Feel free to contact us if you have any questions or require assistance.        Medications listed below were obtained from: Patient/family and Analytic software- Spool  Current Facility-Administered Medications on File Prior to Encounter   Medication Dose Route Frequency Provider Last Rate Last Admin    lactated ringers infusion   Intravenous Continuous David Millan MD 75 mL/hr at 03/21/23 1252 New Bag at 03/21/23 1252     Current Outpatient Medications on File Prior to Encounter   Medication Sig Dispense Refill    budesonide (ENTOCORT EC) 3 mg capsule Take 3 mg by mouth once daily.      busPIRone (BUSPAR) 10 MG tablet Take 1 tablet (10 mg total) by mouth 3 (three) times daily. 270 tablet 1    cholestyramine (QUESTRAN) 4 gram packet Take 1 packet by mouth once daily.      hyoscyamine (LEVBID) 0.375 mg Tb12 Take 375 mcg by mouth 2 (two) times daily.      LORazepam (ATIVAN) 0.5 MG tablet Take 1 tablet (0.5 mg total) by mouth every 12 (twelve) hours as needed for Anxiety. 60 tablet 2    metFORMIN (GLUCOPHAGE-XR) 500 MG ER 24hr tablet Take 1 tablet (500 mg total) by mouth 2 (two) times daily with meals. 180 tablet 1    metoprolol succinate (TOPROL-XL) 50 MG 24 hr tablet Take 1 tablet (50 mg total) by mouth once daily. 90 tablet 1    sertraline (ZOLOFT) 100 MG tablet Take 1 tablet (100 mg total) by mouth once daily. 90 tablet 1    simvastatin (ZOCOR) 20 MG tablet Take 1 tablet (20 mg total) by mouth every evening. 90 tablet 1    triamcinolone acetonide 0.1% (KENALOG) 0.1 % cream Apply topically 2 (two) times daily. " (Patient taking differently: Apply 1 g topically 2 (two) times daily.) 80 g 0    zolpidem (AMBIEN) 10 mg Tab Take 1 tablet (10 mg total) by mouth nightly as needed (insomnia). 30 tablet 2    ergocalciferol (VITAMIN D2) 50,000 unit Cap Take 1 capsule (50,000 Units total) by mouth every 7 days. 12 capsule 1    lipase-protease-amylase 12,000-38,000-60,000 units (CREON) CpDR Take 3 capsules by mouth 3 (three) times daily with meals. (Patient not taking: Reported on 7/15/2024) 270 capsule 11    semaglutide (OZEMPIC) 2 mg/dose (8 mg/3 mL) PnIj Inject 2 mg into the skin every 7 days. (Patient taking differently: Inject 2 mg into the skin every 7 days. SATURDAYS) 3 mL 5    [DISCONTINUED] diphenoxylate-atropine 2.5-0.025 mg (LOMOTIL) 2.5-0.025 mg per tablet Take 1 tablet by mouth 2 (two) times daily as needed for Diarrhea.      [DISCONTINUED] HYDROcodone-acetaminophen (NORCO) 5-325 mg per tablet Take 1 tablet by mouth every 6 (six) hours as needed for Pain. 10 tablet 0    [DISCONTINUED] promethazine (PHENERGAN) 25 MG tablet Take 1 tablet (25 mg total) by mouth 4 (four) times daily as needed for Nausea. 25 tablet 0       Potential issues to be addressed PRIOR TO DISCHARGE  Patient reported not taking the following medications: (Creon). These medications remain on the home medication list. Please address accordingly.     Yu Holloway  EXT 1924

## 2024-07-16 NOTE — HPI
Patient is a 49-year-old male with a past medical history of ulcerative colitis, diabetes mellitus, hyperlipidemia, hypertension, insomnia, and previous C diff. Patient presents to the ED today with complaints of 2 weeks worsening pain in abdomen.  Patient also reports more than 10 episodes of loose stools per day.  Patient follows with GI Dr. Beck.  CT of the abdomen was completed which shows continued mild bowel wall thickening and inflammatory changes predominantly involving the sigmoid colon, nonspecific colitis.  Patient has had multiple recurrences of colitis with multiple past hospitalizations in the past 1.5 years.  Patient was initiated on Levaquin and Flagyl in the ED. WBC shows mild increased from 14-16 from previous admission last month.  Patient has diffuse tenderness to palpation in the abdomen, tachycardic on arrival.  Patient denies fever, chills, shortness for breath, lower extremity edema, chest pain, dysuria or urinary difficulties.  Patient does endorse recent steroid use of prednisone, has been approximately 3 or 4 weeks since he has taken.     In the ED:  Cdiff negative, occult negative, WBC negative.  Troponin 4.9, TSH 1.132, magnesium 1.5, lipase 21, potassium 5, CRP 1.8, WBC 16.73, hemoglobin 11.8.  UA negative.  UDS negative.

## 2024-07-16 NOTE — ASSESSMENT & PLAN NOTE
Patient's anemia is currently stable. Has not received any PRBCs to date.   Current CBC reviewed-   Lab Results   Component Value Date    HGB 10.7 (L) 07/16/2024    HCT 34.4 (L) 07/16/2024     Monitor serial CBC and transfuse if patient becomes hemodynamically unstable, symptomatic or H/H drops below 7/21.

## 2024-07-16 NOTE — ASSESSMENT & PLAN NOTE
Patient's anemia is currently stable. Has not received any PRBCs to date.   Current CBC reviewed-   Lab Results   Component Value Date    HGB 11.8 (L) 07/15/2024    HCT 38.9 (L) 07/15/2024     Monitor serial CBC and transfuse if patient becomes hemodynamically unstable, symptomatic or H/H drops below 7/21.

## 2024-07-16 NOTE — ED PROVIDER NOTES
Encounter Date: 7/15/2024       History     Chief Complaint   Patient presents with    Abdominal Pain    Diarrhea     LUQ abd pain with diarrhea. Hx of UC     49-year-old male presents with progressively worsening abdominal pain with nausea, multiple episodes of loose watery stool over the past 2 weeks.  Patient with a history of ulcerative colitis, diabetes, hypertension hyperlipidemia.  Reports that he was previously seen in the emergency room for similar symptoms.  Has not followed up with  yet.  Reports that he feels that his symptoms are worsening.  He feels that he is getting dehydrated has felt lightheaded from all the loose stool he has been having.  He has not been on antibiotics within the past several months.  He has had C diff in the past.  He reports crampy diffuse abdominal pain.  He has had a decreased appetite.  He denies any urinary problems or changes.  Denies coughing chest pain or shortness of breath.  Denies any other problems or complaints.        Review of patient's allergies indicates:  No Known Allergies  Past Medical History:   Diagnosis Date    C. difficile colitis     Cavitary pneumonia 11/2023    pos aspergillus serology    Diabetes mellitus 01/2022    Hyperlipidemia 2015    Hypertension 2015    Insomnia 2015    Ulcerative colitis      Past Surgical History:   Procedure Laterality Date    BRONCHOSCOPY WITH FLUOROSCOPY Left 11/20/2023    Procedure: BRONCHOSCOPY, WITH FLUOROSCOPY;  Surgeon: Lisa Villarreal MD;  Location: [facility];  Service: Pulmonary;  Laterality: Left;    BRONCHOSCOPY WITH FLUOROSCOPY N/A 11/30/2023    Procedure: BRONCHOSCOPY, WITH FLUOROSCOPY;  Surgeon: Lisa Villarreal MD;  Location: [facility];  Service: Pulmonary;  Laterality: N/A;    CARDIAC ELECTROPHYSIOLOGY MAPPING AND ABLATION  2017    SVT    CHOLECYSTECTOMY  2011    COLONOSCOPY N/A 5/3/2022    Procedure: COLONOSCOPY;  Surgeon: Shan Jean III, MD;  Location: [facility];  Service:  Endoscopy;  Laterality: N/A;    COLONOSCOPY N/A 3/16/2024    Procedure: COLONOSCOPY;  Surgeon: ALEKSANDER Ramos MD;  Location: [facility];  Service: Endoscopy;  Laterality: N/A;    COLONOSCOPY WITH FECAL MICROBIOTA TRANSFER N/A 3/21/2023    Procedure: COLONOSCOPY, WITH FECAL MICROBIOTA TRANSFER;  Surgeon: David Millan MD;  Location: [facility];  Service: Endoscopy;  Laterality: N/A;    ESOPHAGOGASTRODUODENOSCOPY N/A 4/24/2023    Procedure: EGD (ESOPHAGOGASTRODUODENOSCOPY);  Surgeon: Shan Jean III, MD;  Location: [facility];  Service: Endoscopy;  Laterality: N/A;    ESOPHAGOGASTRODUODENOSCOPY N/A 6/5/2024    Procedure: EGD (ESOPHAGOGASTRODUODENOSCOPY);  Surgeon: Odalis Mccabe MD;  Location: [facility];  Service: Endoscopy;  Laterality: N/A;    FLEXIBLE SIGMOIDOSCOPY N/A 6/5/2024    Procedure: SIGMOIDOSCOPY, FLEXIBLE;  Surgeon: Odalis Mccabe MD;  Location: [facility];  Service: Endoscopy;  Laterality: N/A;    GANGLION CYST EXCISION Left 1992    UMBILICAL HERNIA REPAIR  2011    with mesh     Family History   Problem Relation Name Age of Onset    Asthma Mother       Social History     Tobacco Use    Smoking status: Former     Current packs/day: 1.00     Average packs/day: 1 pack/day for 31.5 years (31.5 ttl pk-yrs)     Types: Cigarettes     Start date: 1993    Smokeless tobacco: Never    Tobacco comments:     Pt states he has stopped smoking with Chantix three weeks ago. 12/1/23   Substance Use Topics    Alcohol use: Yes     Comment: ocass    Drug use: Not Currently     Review of Systems   Constitutional:  Positive for activity change, appetite change and fatigue. Negative for fever.   HENT: Negative.  Negative for sore throat.    Respiratory: Negative.  Negative for shortness of breath.    Cardiovascular: Negative.  Negative for chest pain, palpitations and leg swelling.   Gastrointestinal:  Positive for abdominal pain, diarrhea and nausea. Negative for anal bleeding, blood in stool and rectal  pain.   Genitourinary: Negative.  Negative for dysuria, genital sores, penile pain, scrotal swelling, testicular pain and urgency.   Musculoskeletal: Negative.  Negative for arthralgias, back pain, gait problem, myalgias, neck pain and neck stiffness.   Skin:  Positive for pallor. Negative for rash.   Neurological:  Positive for light-headedness. Negative for tremors, syncope, weakness, numbness and headaches.   Hematological:  Does not bruise/bleed easily.   Psychiatric/Behavioral: Negative.     All other systems reviewed and are negative.      Physical Exam     Initial Vitals [07/15/24 1640]   BP Pulse Resp Temp SpO2   (!) 170/100 106 20 98.5 °F (36.9 °C) 99 %      MAP       --         Physical Exam    Nursing note and vitals reviewed.  Constitutional: He is cooperative. He has a sickly appearance. No distress.   HENT:   Head: Normocephalic and atraumatic.   Nose: Nose normal. No mucosal edema or rhinorrhea.   Mouth/Throat: Uvula is midline and oropharynx is clear and moist. Mucous membranes are dry. No oral lesions. No uvula swelling. No oropharyngeal exudate or posterior oropharyngeal edema.   Eyes: Conjunctivae, EOM and lids are normal. Pupils are equal, round, and reactive to light. Right eye exhibits no discharge. Left eye exhibits no discharge. No scleral icterus.   Neck: Trachea normal and phonation normal. Neck supple. No JVD present.   Normal range of motion.   Full passive range of motion without pain.     Cardiovascular:  Normal rate, regular rhythm, normal heart sounds, intact distal pulses and normal pulses.     Exam reveals no gallop, no distant heart sounds, no friction rub and no decreased pulses.       No murmur heard.  Pulmonary/Chest: Effort normal and breath sounds normal. No stridor. No respiratory distress. He has no decreased breath sounds. He has no wheezes. He has no rhonchi. He has no rales.   Abdominal: Abdomen is soft. Bowel sounds are normal. He exhibits no distension, no pulsatile  midline mass and no mass. There is generalized abdominal tenderness. No hernia.   No right CVA tenderness.  No left CVA tenderness. There is no rebound, no guarding, no tenderness at McBurney's point and negative Brooks's sign. negative psoas sign and negative Rovsing's sign  Musculoskeletal:         General: No tenderness or edema. Normal range of motion.      Right hand: Normal. Normal capillary refill. Normal pulse.      Left hand: Normal. Normal capillary refill. Normal pulse.      Cervical back: Normal, full passive range of motion without pain, normal range of motion and neck supple. No erythema, tenderness or bony tenderness. No pain with movement, spinous process tenderness or muscular tenderness. Normal range of motion and normal range of motion. Normal.      Thoracic back: Normal. No swelling, tenderness or bony tenderness. Normal range of motion.      Lumbar back: Normal. No swelling, tenderness or bony tenderness. Normal range of motion.      Right lower leg: No tenderness. No edema.      Left lower leg: No tenderness. No edema.      Right foot: Normal. Normal capillary refill. No swelling. Normal pulse.      Left foot: Normal. Normal capillary refill. No swelling. Normal pulse.      Comments: Pulses are 2+ throughout, cap refill is less than 2 sec throughout, no edema noted, extremities are nontender throughout with full range of motion     Lymphadenopathy:     He has no cervical adenopathy.   Neurological: He is alert and oriented to person, place, and time. He has normal strength. No cranial nerve deficit or sensory deficit. Coordination and gait normal.   No focal deficits   Skin: Skin is warm and dry. Capillary refill takes 2 to 3 seconds. No ecchymosis, no petechiae and no rash noted. No cyanosis or erythema. There is pallor.   Psychiatric: He has a normal mood and affect. His speech is normal and behavior is normal. Cognition and memory are normal.         ED Course   Procedures  Labs Reviewed    CBC W/ AUTO DIFFERENTIAL - Abnormal; Notable for the following components:       Result Value    WBC 16.73 (*)     RBC 3.89 (*)     Hemoglobin 11.8 (*)     Hematocrit 38.9 (*)      (*)     MCHC 30.3 (*)     RDW 16.7 (*)     Immature Granulocytes 1.4 (*)     Gran # (ANC) 13.4 (*)     Immature Grans (Abs) 0.24 (*)     Mono # 1.2 (*)     Gran % 79.8 (*)     Lymph % 10.5 (*)     All other components within normal limits   COMPREHENSIVE METABOLIC PANEL - Abnormal; Notable for the following components:    ALT 45 (*)     Anion Gap 6 (*)     All other components within normal limits   URINALYSIS, REFLEX TO URINE CULTURE - Abnormal; Notable for the following components:    Color, UA Colorless (*)     All other components within normal limits    Narrative:     Specimen Source->Urine   C-REACTIVE PROTEIN - Abnormal; Notable for the following components:    CRP 1.80 (*)     All other components within normal limits   MAGNESIUM - Abnormal; Notable for the following components:    Magnesium 1.5 (*)     All other components within normal limits   CLOSTRIDIUM DIFFICILE   CULTURE, STOOL   CULTURE, STOOL   CULTURE, BLOOD   CULTURE, BLOOD   LIPASE   CK   MAGNESIUM   TSH   OCCULT BLOOD X 1, STOOL   WBC, STOOL   STOOL EXAM-OVA,CYSTS,PARASITES   LIPASE   TSH   CK   DRUG SCREEN PANEL, URINE EMERGENCY   OCCULT BLOOD X 1, STOOL   WBC, STOOL   DRUG SCREEN PANEL, URINE EMERGENCY   TROPONIN I HIGH SENSITIVITY   STOOL EXAM-OVA,CYSTS,PARASITES   TROPONIN I HIGH SENSITIVITY   POCT LACTATE          Imaging Results              CT Abdomen Pelvis With IV Contrast Routine Oral Contrast (Final result)  Result time 07/15/24 20:51:10      Final result by Kirk Dooley MD (07/15/24 20:51:10)                   Impression:      Continued mild bowel wall thickening and inflammatory change predominantly involving the sigmoid colon, similar in extent to prior study of 07/06/2024.  This may reflect a nonspecific colitis.  No evidence of abscess  or free intraperitoneal air.    Redemonstration of a 0.4 cm calculus at the right ureteropelvic junction, similar to prior study.  No significant hydronephrosis appreciated.    Bilateral nonobstructing nephrolithiasis.    Hepatomegaly.  Cholecystectomy.    Additional findings as above.      Electronically signed by: Kirk Dooley MD  Date:    07/15/2024  Time:    20:51               Narrative:    EXAMINATION:  CT ABDOMEN PELVIS WITH IV CONTRAST    CLINICAL HISTORY:  LLQ abdominal pain;    TECHNIQUE:  Low dose axial images, sagittal and coronal reformations were obtained from the lung bases to the pubic symphysis following the IV administration of 100 mL of Omnipaque 350 .  Oral contrast was not given.    COMPARISON:  CT 07/06/2024    FINDINGS:  The lung bases are unremarkable.  There is no pleural fluid present.  The visualized portions of the heart appear normal.    The liver is mildly enlarged.  No focal hepatic abnormality identified.  The gallbladder is surgically absent.  There is no intra-or extrahepatic biliary ductal dilatation.    The stomach, spleen, pancreas, and adrenal glands are unremarkable.  There is a small duodenum diverticulum incidentally noted.    Kidneys are normal in size and location.  There is a stable appearing 0.4 cm calculus again identified at the right UPJ, similar position to prior study.  No significant right-sided hydronephrosis appreciated.  Stable additional punctate nonobstructing right-sided nephrolithiasis.  The left kidney demonstrates several nonobstructing renal calculi.  No significant left-sided hydronephrosis or obstructing left-sided ureteral calculus appreciated.  Urinary bladder appears within normal limits.  Prostate is unremarkable.    The abdominal aorta is normal in course and caliber with mild atherosclerotic calcification along its course.  There is no retroperitoneal hematoma.    There is no evidence of small-bowel obstruction.  There is continued mild colonic  wall thickening and inflammatory change predominantly involving the sigmoid colon, similar to prior study.  There is no evidence of abscess.  There is no free intraperitoneal air or portal venous gas.  No CT evidence of acute appendicitis.  There are scattered shotty small mesenteric and retroperitoneal lymph nodes.    Osseous structures demonstrate degenerative changes.  The extraperitoneal soft tissues are unremarkable.                                       Medications   levoFLOXacin 750 mg/150 mL IVPB 750 mg (has no administration in time range)   metronidazole IVPB 500 mg (500 mg Intravenous New Bag 7/15/24 2120)   magnesium sulfate 2g in water 50mL IVPB (premix) (has no administration in time range)   sodium chloride 0.9% bolus 1,000 mL 1,000 mL (0 mLs Intravenous Stopped 7/15/24 2108)   iohexoL (OMNIPAQUE 350) injection 100 mL (100 mLs Intravenous Given 7/15/24 2025)   morphine injection 2 mg (2 mg Intravenous Given 7/15/24 2114)   ondansetron injection 4 mg (4 mg Intravenous Given 7/15/24 2114)     Medical Decision Making  49-year-old male with a history of ulcerative colitis with complaints of worsening symptoms of abdominal pain and diarrhea over the past 2 weeks.  Clinically the patient appears pale.  He was somewhat tachycardic.  He is not hypotensive.  Does appear dehydrated.  Mild diffuse tenderness with no rebound or guarding.  CT scan with chronic inflammatory changes/colonic thickening.  White blood cell count has increased compared with previous.  As the patient's clinical scenario is worsening, blood cultures obtained, broad-spectrum IV antibiotics given and hospital medicine consulted for admission for further care/treatment/GI evaluation    Amount and/or Complexity of Data Reviewed  Labs: ordered.  Radiology: ordered.    Risk  Prescription drug management.  Decision regarding hospitalization.                                      Clinical Impression:  Final diagnoses:  [R10.9] Abdominal  pain  [K52.9] Colitis (Primary)  [E86.0] Dehydration          ED Disposition Condition    Admit Stable                Maribel Dobson MD  07/15/24 9684

## 2024-07-16 NOTE — ASSESSMENT & PLAN NOTE
Strong history of recurrent ulcerative colitis, WBC 16 compared to previous last month of 14  With 10 or more episodes of diarrhea per day, nausea, B lower quadrant abdominal pain  CT of the abdomen shows continued mild bowel wall thickening and inflammatory changes predominantly involving the sigmoid colon.  C diff negative, WBC negative, occult negative.  Continue home regimen  Clear liquid diet for now, advance as tolerated  GI consulted-likely proceeding towards partial colectomy/sigmoidectomy for surgical management.

## 2024-07-16 NOTE — SUBJECTIVE & OBJECTIVE
Past Medical History:   Diagnosis Date    C. difficile colitis     Cavitary pneumonia 11/2023    pos aspergillus serology    Diabetes mellitus 01/2022    Hyperlipidemia 2015    Hypertension 2015    Insomnia 2015    Ulcerative colitis        Past Surgical History:   Procedure Laterality Date    BRONCHOSCOPY WITH FLUOROSCOPY Left 11/20/2023    Procedure: BRONCHOSCOPY, WITH FLUOROSCOPY;  Surgeon: Lisa Villarreal MD;  Location: [facility];  Service: Pulmonary;  Laterality: Left;    BRONCHOSCOPY WITH FLUOROSCOPY N/A 11/30/2023    Procedure: BRONCHOSCOPY, WITH FLUOROSCOPY;  Surgeon: Lisa Villarreal MD;  Location: [facility];  Service: Pulmonary;  Laterality: N/A;    CARDIAC ELECTROPHYSIOLOGY MAPPING AND ABLATION  2017    SVT    CHOLECYSTECTOMY  2011    COLONOSCOPY N/A 5/3/2022    Procedure: COLONOSCOPY;  Surgeon: Shan Jean III, MD;  Location: [facility];  Service: Endoscopy;  Laterality: N/A;    COLONOSCOPY N/A 3/16/2024    Procedure: COLONOSCOPY;  Surgeon: ALEKSANDER Ramos MD;  Location: [facility];  Service: Endoscopy;  Laterality: N/A;    COLONOSCOPY WITH FECAL MICROBIOTA TRANSFER N/A 3/21/2023    Procedure: COLONOSCOPY, WITH FECAL MICROBIOTA TRANSFER;  Surgeon: David Millan MD;  Location: [facility];  Service: Endoscopy;  Laterality: N/A;    ESOPHAGOGASTRODUODENOSCOPY N/A 4/24/2023    Procedure: EGD (ESOPHAGOGASTRODUODENOSCOPY);  Surgeon: Shan Jean III, MD;  Location: [facility];  Service: Endoscopy;  Laterality: N/A;    ESOPHAGOGASTRODUODENOSCOPY N/A 6/5/2024    Procedure: EGD (ESOPHAGOGASTRODUODENOSCOPY);  Surgeon: Odalis Mccabe MD;  Location: [facility];  Service: Endoscopy;  Laterality: N/A;    FLEXIBLE SIGMOIDOSCOPY N/A 6/5/2024    Procedure: SIGMOIDOSCOPY, FLEXIBLE;  Surgeon: Odalis Mccabe MD;  Location: [facility];  Service: Endoscopy;  Laterality: N/A;    GANGLION CYST EXCISION Left 1992    UMBILICAL HERNIA REPAIR  2011    with mesh       Review of patient's allergies  indicates:  No Known Allergies    Current Facility-Administered Medications on File Prior to Encounter   Medication    lactated ringers infusion     Current Outpatient Medications on File Prior to Encounter   Medication Sig    budesonide (ENTOCORT EC) 3 mg capsule Take 3 mg by mouth once daily.    busPIRone (BUSPAR) 10 MG tablet Take 1 tablet (10 mg total) by mouth 3 (three) times daily.    cholestyramine (QUESTRAN) 4 gram packet Take 1 packet by mouth once daily.    diphenoxylate-atropine 2.5-0.025 mg (LOMOTIL) 2.5-0.025 mg per tablet Take 1 tablet by mouth 2 (two) times daily as needed for Diarrhea.    ergocalciferol (VITAMIN D2) 50,000 unit Cap Take 1 capsule (50,000 Units total) by mouth every 7 days.    HYDROcodone-acetaminophen (NORCO) 5-325 mg per tablet Take 1 tablet by mouth every 6 (six) hours as needed for Pain.    hyoscyamine (LEVBID) 0.375 mg Tb12 Take 375 mcg by mouth 2 (two) times daily.    lipase-protease-amylase 12,000-38,000-60,000 units (CREON) CpDR Take 3 capsules by mouth 3 (three) times daily with meals.    LORazepam (ATIVAN) 0.5 MG tablet Take 1 tablet (0.5 mg total) by mouth every 12 (twelve) hours as needed for Anxiety.    metFORMIN (GLUCOPHAGE-XR) 500 MG ER 24hr tablet Take 1 tablet (500 mg total) by mouth 2 (two) times daily with meals.    metoprolol succinate (TOPROL-XL) 50 MG 24 hr tablet Take 1 tablet (50 mg total) by mouth once daily.    promethazine (PHENERGAN) 25 MG tablet Take 1 tablet (25 mg total) by mouth 4 (four) times daily as needed for Nausea.    semaglutide (OZEMPIC) 2 mg/dose (8 mg/3 mL) PnIj Inject 2 mg into the skin every 7 days.    sertraline (ZOLOFT) 100 MG tablet Take 1 tablet (100 mg total) by mouth once daily.    simvastatin (ZOCOR) 20 MG tablet Take 1 tablet (20 mg total) by mouth every evening.    triamcinolone acetonide 0.1% (KENALOG) 0.1 % cream Apply topically 2 (two) times daily. (Patient taking differently: Apply 1 g topically 2 (two) times daily.)    zolpidem  (AMBIEN) 10 mg Tab Take 1 tablet (10 mg total) by mouth nightly as needed (insomnia).     Family History       Problem Relation (Age of Onset)    Asthma Mother          Tobacco Use    Smoking status: Former     Current packs/day: 1.00     Average packs/day: 1 pack/day for 31.5 years (31.5 ttl pk-yrs)     Types: Cigarettes     Start date: 1993    Smokeless tobacco: Never    Tobacco comments:     Pt states he has stopped smoking with Chantix three weeks ago. 12/1/23   Substance and Sexual Activity    Alcohol use: Yes     Comment: ocass    Drug use: Not Currently    Sexual activity: Not Currently     Review of Systems   Constitutional:  Negative for chills, fatigue and fever.   HENT: Negative.     Eyes: Negative.    Respiratory: Negative.  Negative for cough and shortness of breath.    Cardiovascular: Negative.  Negative for chest pain.   Gastrointestinal:  Positive for abdominal pain, diarrhea and nausea. Negative for blood in stool, constipation and vomiting.   Endocrine: Negative.    Genitourinary: Negative.  Negative for difficulty urinating and dysuria.   Musculoskeletal: Negative.    Skin:  Positive for pallor.   Allergic/Immunologic: Negative.    Neurological: Negative.  Negative for dizziness, syncope, facial asymmetry, weakness and headaches.   Hematological: Negative.    Psychiatric/Behavioral: Negative.       Objective:     Vital Signs (Most Recent):  Temp: 98.5 °F (36.9 °C) (07/15/24 1640)  Pulse: 94 (07/15/24 2114)  Resp: 16 (07/15/24 2114)  BP: (!) 163/80 (07/15/24 2110)  SpO2: 97 % (07/15/24 2114) Vital Signs (24h Range):  Temp:  [98.5 °F (36.9 °C)] 98.5 °F (36.9 °C)  Pulse:  [] 94  Resp:  [16-20] 16  SpO2:  [97 %-99 %] 97 %  BP: (151-170)/() 163/80     Weight: 124.3 kg (274 lb)  Body mass index is 45.6 kg/m².     Physical Exam  Vitals reviewed.   Constitutional:       General: He is not in acute distress.     Appearance: Normal appearance. He is normal weight. He is ill-appearing  (chronically).   HENT:      Head: Normocephalic and atraumatic.   Cardiovascular:      Rate and Rhythm: Normal rate and regular rhythm.      Pulses: Normal pulses.      Heart sounds: Normal heart sounds.   Pulmonary:      Effort: Pulmonary effort is normal. No respiratory distress.      Breath sounds: Normal breath sounds.   Abdominal:      General: Abdomen is flat. Bowel sounds are normal. There is no distension.      Palpations: Abdomen is soft.      Tenderness: There is abdominal tenderness (diffuse mostly lower quadrants and left upper). There is no guarding.   Musculoskeletal:         General: Normal range of motion.      Cervical back: Normal range of motion.      Right lower leg: No edema.      Left lower leg: No edema.   Skin:     General: Skin is warm and dry.   Neurological:      General: No focal deficit present.      Mental Status: He is alert and oriented to person, place, and time. Mental status is at baseline.   Psychiatric:         Mood and Affect: Mood normal.                Significant Labs: All pertinent labs within the past 24 hours have been reviewed.  CBC:   Recent Labs   Lab 07/15/24  1737   WBC 16.73*   HGB 11.8*   HCT 38.9*        CMP:   Recent Labs   Lab 07/15/24  1737      K 5.0      CO2 26   GLU 93   BUN 11   CREATININE 1.0   CALCIUM 8.8   PROT 6.5   ALBUMIN 3.6   BILITOT 0.3   ALKPHOS 66   AST 37   ALT 45*   ANIONGAP 6*     Lipase:   Recent Labs   Lab 07/15/24  1737   LIPASE 21     Magnesium:   Recent Labs   Lab 07/15/24  1737   MG 1.5*     Troponin:   Recent Labs   Lab 07/15/24  1737   TROPONINIHS 4.9     TSH:   Recent Labs   Lab 07/15/24  1737   TSH 1.132     Urine Studies:   Recent Labs   Lab 07/15/24  1752   COLORU Colorless*   APPEARANCEUA Clear   PHUR 6.0   SPECGRAV 1.015   PROTEINUA Negative   GLUCUA Negative   KETONESU Negative   BILIRUBINUA Negative   OCCULTUA Negative   NITRITE Negative   UROBILINOGEN Negative   LEUKOCYTESUR Negative       Significant  Imaging: I have reviewed all pertinent imaging results/findings within the past 24 hours.

## 2024-07-16 NOTE — SUBJECTIVE & OBJECTIVE
Interval History:  Patient seen and examined.  Continues to have significant abdominal pain.  No acute events overnight.      Objective:     Vital Signs (Most Recent):  Temp: 98.1 °F (36.7 °C) (07/16/24 1145)  Pulse: 75 (07/16/24 1145)  Resp: 16 (07/16/24 1145)  BP: 137/69 (07/16/24 1145)  SpO2: 97 % (07/16/24 1145) Vital Signs (24h Range):  Temp:  [97.9 °F (36.6 °C)-98.6 °F (37 °C)] 98.1 °F (36.7 °C)  Pulse:  [] 75  Resp:  [16-20] 16  SpO2:  [96 %-99 %] 97 %  BP: (108-170)/() 137/69     Weight: 129 kg (284 lb 6.4 oz)  Body mass index is 47.33 kg/m².    Intake/Output Summary (Last 24 hours) at 7/16/2024 1210  Last data filed at 7/16/2024 0910  Gross per 24 hour   Intake 1760 ml   Output --   Net 1760 ml         Physical Exam  Vitals and nursing note reviewed.   Eyes:      Pupils: Pupils are equal, round, and reactive to light.   Neck:      Vascular: No JVD.   Cardiovascular:      Rate and Rhythm: Normal rate and regular rhythm.      Heart sounds: Normal heart sounds.   Pulmonary:      Effort: Pulmonary effort is normal.      Breath sounds: Normal breath sounds.   Abdominal:      General: Bowel sounds are normal. There is distension.      Palpations: Abdomen is soft.      Tenderness: There is abdominal tenderness. There is guarding. There is no rebound.   Musculoskeletal:         General: No deformity.   Lymphadenopathy:      Cervical: No cervical adenopathy.   Skin:     Coloration: Skin is not pale.      Findings: No rash.             Significant Labs: All pertinent labs within the past 24 hours have been reviewed.    Significant Imaging: I have reviewed all pertinent imaging results/findings within the past 24 hours.

## 2024-07-16 NOTE — PROGRESS NOTES
[facility] Medicine  Progress Note    Patient Name: Jacoby Jean-Baptiste  MRN: [id number]  Patient Class: IP- Inpatient   Admission Date: 7/15/2024  Length of Stay: 1 days  Attending Physician: Carlos Alberto Sifuentes MD  Primary Care Provider: Hunter Aguilar III, MD        Subjective:     Principal Problem:Colitis        HPI:  Patient is a 49-year-old male with a past medical history of ulcerative colitis, diabetes mellitus, hyperlipidemia, hypertension, insomnia, and previous C diff. Patient presents to the ED today with complaints of 2 weeks worsening pain in abdomen.  Patient also reports more than 10 episodes of loose stools per day.  Patient follows with GI Dr. Beck.  CT of the abdomen was completed which shows continued mild bowel wall thickening and inflammatory changes predominantly involving the sigmoid colon, nonspecific colitis.  Patient has had multiple recurrences of colitis with multiple past hospitalizations in the past 1.5 years.  Patient was initiated on Levaquin and Flagyl in the ED. WBC shows mild increased from 14-16 from previous admission last month.  Patient has diffuse tenderness to palpation in the abdomen, tachycardic on arrival.  Patient denies fever, chills, shortness for breath, lower extremity edema, chest pain, dysuria or urinary difficulties.  Patient does endorse recent steroid use of prednisone, has been approximately 3 or 4 weeks since he has taken.     In the ED:  Cdiff negative, occult negative, WBC negative.  Troponin 4.9, TSH 1.132, magnesium 1.5, lipase 21, potassium 5, CRP 1.8, WBC 16.73, hemoglobin 11.8.  UA negative.  UDS negative.    Overview/Hospital Course:  No notes on file    Interval History:  Patient seen and examined.  Continues to have significant abdominal pain.  No acute events overnight.      Objective:     Vital Signs (Most Recent):  Temp: 98.1 °F (36.7 °C) (07/16/24 1145)  Pulse: 75 (07/16/24 1145)  Resp: 16 (07/16/24 1145)  BP: 137/69  (07/16/24 1145)  SpO2: 97 % (07/16/24 1145) Vital Signs (24h Range):  Temp:  [97.9 °F (36.6 °C)-98.6 °F (37 °C)] 98.1 °F (36.7 °C)  Pulse:  [] 75  Resp:  [16-20] 16  SpO2:  [96 %-99 %] 97 %  BP: (108-170)/() 137/69     Weight: 129 kg (284 lb 6.4 oz)  Body mass index is 47.33 kg/m².    Intake/Output Summary (Last 24 hours) at 7/16/2024 1210  Last data filed at 7/16/2024 0910  Gross per 24 hour   Intake 1760 ml   Output --   Net 1760 ml         Physical Exam  Vitals and nursing note reviewed.   Eyes:      Pupils: Pupils are equal, round, and reactive to light.   Neck:      Vascular: No JVD.   Cardiovascular:      Rate and Rhythm: Normal rate and regular rhythm.      Heart sounds: Normal heart sounds.   Pulmonary:      Effort: Pulmonary effort is normal.      Breath sounds: Normal breath sounds.   Abdominal:      General: Bowel sounds are normal. There is distension.      Palpations: Abdomen is soft.      Tenderness: There is abdominal tenderness. There is guarding. There is no rebound.   Musculoskeletal:         General: No deformity.   Lymphadenopathy:      Cervical: No cervical adenopathy.   Skin:     Coloration: Skin is not pale.      Findings: No rash.             Significant Labs: All pertinent labs within the past 24 hours have been reviewed.    Significant Imaging: I have reviewed all pertinent imaging results/findings within the past 24 hours.    Assessment/Plan:      * Colitis  Strong history of recurrent ulcerative colitis, WBC 16 compared to previous last month of 14  With 10 or more episodes of diarrhea per day, nausea, B lower quadrant abdominal pain  CT of the abdomen shows continued mild bowel wall thickening and inflammatory changes predominantly involving the sigmoid colon.  C diff negative, WBC negative, occult negative.  Continue home regimen  Clear liquid diet for now, advance as tolerated  GI consulted-likely proceeding towards partial colectomy/sigmoidectomy for surgical  "management.      Hypomagnesemia  Patient has Abnormal Magnesium: hypomagnesemia. Will continue to monitor electrolytes closely. Will replace the affected electrolytes and repeat labs to be done after interventions completed. The patient's magnesium results have been reviewed and are listed below.  Recent Labs   Lab 07/16/24  0449   MG 2.0          Dehydration  Strict I's and O's, IV fluid hydration      Anemia  Patient's anemia is currently stable. Has not received any PRBCs to date.   Current CBC reviewed-   Lab Results   Component Value Date    HGB 10.7 (L) 07/16/2024    HCT 34.4 (L) 07/16/2024     Monitor serial CBC and transfuse if patient becomes hemodynamically unstable, symptomatic or H/H drops below 7/21.    Type 2 diabetes mellitus without complication, without long-term current use of insulin  Patient's FSGs are controlled on current medication regimen.  Last A1c reviewed-   Lab Results   Component Value Date    HGBA1C 5.2 06/06/2024     Most recent fingerstick glucose reviewed-   POC Glucose   Date Value Ref Range Status   07/16/2024 73 70 - 110 Final   07/16/2024 97 70 - 110 Final   07/15/2024 102 70 - 110 Final   05/09/2024 156 (H) 70 - 110 Final       Current correctional scale  Low  Maintain anti-hyperglycemic dose as follows-   Antihyperglycemics (From admission, onward)      Start     Stop Route Frequency Ordered    07/15/24 2301  insulin aspart U-100 pen 0-5 Units         -- SubQ Before meals & nightly PRN 07/15/24 2203          Hold Oral hypoglycemics while patient is in the hospital.      Morbid obesity  Body mass index is 47.33 kg/m². Morbid obesity complicates all aspects of disease management from diagnostic modalities to treatment. Weight loss encouraged and health benefits explained to patient.           VTE Risk Mitigation (From admission, onward)           Ordered     enoxaparin injection 40 mg  Every 12 hours        Note to Pharmacy: Ht: 5' 5" (1.651 m)  Wt: 124.3 kg (274 lb)  Estimated " Creatinine Clearance: 108.3 mL/min (based on SCr of 1 mg/dL).  Body mass index is 45.6 kg/m².    07/15/24 2203     IP VTE HIGH RISK PATIENT  Once         07/15/24 2203     Place sequential compression device  Until discontinued         07/15/24 2203                    Discharge Planning   GERARDO: 7/19/2024     Code Status: Full Code   Is the patient medically ready for discharge?:     Reason for patient still in hospital (select all that apply): Treatment                     Carlos Alberto Sifuentes MD  Department of Hospital Medicine   [facility]

## 2024-07-16 NOTE — ASSESSMENT & PLAN NOTE
Patient has Abnormal Magnesium: hypomagnesemia. Will continue to monitor electrolytes closely. Will replace the affected electrolytes and repeat labs to be done after interventions completed. The patient's magnesium results have been reviewed and are listed below.  Recent Labs   Lab 07/16/24  0449   MG 2.0

## 2024-07-16 NOTE — ED NOTES
Bed: [place] [address]  Expected date:   Expected time:   Means of arrival:   Comments:  Estiven from 13

## 2024-07-16 NOTE — H&P
[facility] - Emergency Dept  Hospital Medicine  History & Physical    Patient Name: Jacoby Jean-Baptiste  MRN: [id number]  Patient Class: IP- Inpatient  Admission Date: 7/15/2024  Attending Physician: Jake Fitzgerald MD   Primary Care Provider: Hunter Aguilar III, MD         Patient information was obtained from patient and ER records.     Subjective:     Principal Problem:Colitis    Chief Complaint:   Chief Complaint   Patient presents with    Abdominal Pain    Diarrhea     LUQ abd pain with diarrhea. Hx of UC        HPI: Patient is a 49-year-old male with a past medical history of ulcerative colitis, diabetes mellitus, hyperlipidemia, hypertension, insomnia, and previous C diff. Patient presents to the ED today with complaints of 2 weeks worsening pain in abdomen.  Patient also reports more than 10 episodes of loose stools per day.  Patient follows with GI Dr. Beck.  CT of the abdomen was completed which shows continued mild bowel wall thickening and inflammatory changes predominantly involving the sigmoid colon, nonspecific colitis.  Patient has had multiple recurrences of colitis with multiple past hospitalizations in the past 1.5 years.  Patient was initiated on Levaquin and Flagyl in the ED. WBC shows mild increased from 14-16 from previous admission last month.  Patient has diffuse tenderness to palpation in the abdomen, tachycardic on arrival.  Patient denies fever, chills, shortness for breath, lower extremity edema, chest pain, dysuria or urinary difficulties.  Patient does endorse recent steroid use of prednisone, has been approximately 3 or 4 weeks since he has taken.     In the ED:  Cdiff negative, occult negative, WBC negative.  Troponin 4.9, TSH 1.132, magnesium 1.5, lipase 21, potassium 5, CRP 1.8, WBC 16.73, hemoglobin 11.8.  UA negative.  UDS negative.    Past Medical History:   Diagnosis Date    C. difficile colitis     Cavitary pneumonia 11/2023    pos aspergillus serology     Diabetes mellitus 01/2022    Hyperlipidemia 2015    Hypertension 2015    Insomnia 2015    Ulcerative colitis        Past Surgical History:   Procedure Laterality Date    BRONCHOSCOPY WITH FLUOROSCOPY Left 11/20/2023    Procedure: BRONCHOSCOPY, WITH FLUOROSCOPY;  Surgeon: Lisa Villarreal MD;  Location: [facility];  Service: Pulmonary;  Laterality: Left;    BRONCHOSCOPY WITH FLUOROSCOPY N/A 11/30/2023    Procedure: BRONCHOSCOPY, WITH FLUOROSCOPY;  Surgeon: Lisa Villarreal MD;  Location: [facility];  Service: Pulmonary;  Laterality: N/A;    CARDIAC ELECTROPHYSIOLOGY MAPPING AND ABLATION  2017    SVT    CHOLECYSTECTOMY  2011    COLONOSCOPY N/A 5/3/2022    Procedure: COLONOSCOPY;  Surgeon: Shan Jean III, MD;  Location: [facility];  Service: Endoscopy;  Laterality: N/A;    COLONOSCOPY N/A 3/16/2024    Procedure: COLONOSCOPY;  Surgeon: ALEKSANDER Ramos MD;  Location: [facility];  Service: Endoscopy;  Laterality: N/A;    COLONOSCOPY WITH FECAL MICROBIOTA TRANSFER N/A 3/21/2023    Procedure: COLONOSCOPY, WITH FECAL MICROBIOTA TRANSFER;  Surgeon: David Millan MD;  Location: [facility];  Service: Endoscopy;  Laterality: N/A;    ESOPHAGOGASTRODUODENOSCOPY N/A 4/24/2023    Procedure: EGD (ESOPHAGOGASTRODUODENOSCOPY);  Surgeon: Shan Jean III, MD;  Location: [facility];  Service: Endoscopy;  Laterality: N/A;    ESOPHAGOGASTRODUODENOSCOPY N/A 6/5/2024    Procedure: EGD (ESOPHAGOGASTRODUODENOSCOPY);  Surgeon: Odalis Mccabe MD;  Location: [facility];  Service: Endoscopy;  Laterality: N/A;    FLEXIBLE SIGMOIDOSCOPY N/A 6/5/2024    Procedure: SIGMOIDOSCOPY, FLEXIBLE;  Surgeon: Odalis Mccabe MD;  Location: [facility];  Service: Endoscopy;  Laterality: N/A;    GANGLION CYST EXCISION Left 1992    UMBILICAL HERNIA REPAIR  2011    with mesh       Review of patient's allergies indicates:  No Known Allergies    Current Facility-Administered Medications on File Prior to Encounter   Medication    lactated  ringers infusion     Current Outpatient Medications on File Prior to Encounter   Medication Sig    budesonide (ENTOCORT EC) 3 mg capsule Take 3 mg by mouth once daily.    busPIRone (BUSPAR) 10 MG tablet Take 1 tablet (10 mg total) by mouth 3 (three) times daily.    cholestyramine (QUESTRAN) 4 gram packet Take 1 packet by mouth once daily.    diphenoxylate-atropine 2.5-0.025 mg (LOMOTIL) 2.5-0.025 mg per tablet Take 1 tablet by mouth 2 (two) times daily as needed for Diarrhea.    ergocalciferol (VITAMIN D2) 50,000 unit Cap Take 1 capsule (50,000 Units total) by mouth every 7 days.    HYDROcodone-acetaminophen (NORCO) 5-325 mg per tablet Take 1 tablet by mouth every 6 (six) hours as needed for Pain.    hyoscyamine (LEVBID) 0.375 mg Tb12 Take 375 mcg by mouth 2 (two) times daily.    lipase-protease-amylase 12,000-38,000-60,000 units (CREON) CpDR Take 3 capsules by mouth 3 (three) times daily with meals.    LORazepam (ATIVAN) 0.5 MG tablet Take 1 tablet (0.5 mg total) by mouth every 12 (twelve) hours as needed for Anxiety.    metFORMIN (GLUCOPHAGE-XR) 500 MG ER 24hr tablet Take 1 tablet (500 mg total) by mouth 2 (two) times daily with meals.    metoprolol succinate (TOPROL-XL) 50 MG 24 hr tablet Take 1 tablet (50 mg total) by mouth once daily.    promethazine (PHENERGAN) 25 MG tablet Take 1 tablet (25 mg total) by mouth 4 (four) times daily as needed for Nausea.    semaglutide (OZEMPIC) 2 mg/dose (8 mg/3 mL) PnIj Inject 2 mg into the skin every 7 days.    sertraline (ZOLOFT) 100 MG tablet Take 1 tablet (100 mg total) by mouth once daily.    simvastatin (ZOCOR) 20 MG tablet Take 1 tablet (20 mg total) by mouth every evening.    triamcinolone acetonide 0.1% (KENALOG) 0.1 % cream Apply topically 2 (two) times daily. (Patient taking differently: Apply 1 g topically 2 (two) times daily.)    zolpidem (AMBIEN) 10 mg Tab Take 1 tablet (10 mg total) by mouth nightly as needed (insomnia).     Family History       Problem Relation  (Age of Onset)    Asthma Mother          Tobacco Use    Smoking status: Former     Current packs/day: 1.00     Average packs/day: 1 pack/day for 31.5 years (31.5 ttl pk-yrs)     Types: Cigarettes     Start date: 1993    Smokeless tobacco: Never    Tobacco comments:     Pt states he has stopped smoking with Chantix three weeks ago. 12/1/23   Substance and Sexual Activity    Alcohol use: Yes     Comment: ocass    Drug use: Not Currently    Sexual activity: Not Currently     Review of Systems   Constitutional:  Negative for chills, fatigue and fever.   HENT: Negative.     Eyes: Negative.    Respiratory: Negative.  Negative for cough and shortness of breath.    Cardiovascular: Negative.  Negative for chest pain.   Gastrointestinal:  Positive for abdominal pain, diarrhea and nausea. Negative for blood in stool, constipation and vomiting.   Endocrine: Negative.    Genitourinary: Negative.  Negative for difficulty urinating and dysuria.   Musculoskeletal: Negative.    Skin:  Positive for pallor.   Allergic/Immunologic: Negative.    Neurological: Negative.  Negative for dizziness, syncope, facial asymmetry, weakness and headaches.   Hematological: Negative.    Psychiatric/Behavioral: Negative.       Objective:     Vital Signs (Most Recent):  Temp: 98.5 °F (36.9 °C) (07/15/24 1640)  Pulse: 94 (07/15/24 2114)  Resp: 16 (07/15/24 2114)  BP: (!) 163/80 (07/15/24 2110)  SpO2: 97 % (07/15/24 2114) Vital Signs (24h Range):  Temp:  [98.5 °F (36.9 °C)] 98.5 °F (36.9 °C)  Pulse:  [] 94  Resp:  [16-20] 16  SpO2:  [97 %-99 %] 97 %  BP: (151-170)/() 163/80     Weight: 124.3 kg (274 lb)  Body mass index is 45.6 kg/m².     Physical Exam  Vitals reviewed.   Constitutional:       General: He is not in acute distress.     Appearance: Normal appearance. He is normal weight. He is ill-appearing (chronically).   HENT:      Head: Normocephalic and atraumatic.   Cardiovascular:      Rate and Rhythm: Normal rate and regular rhythm.       Pulses: Normal pulses.      Heart sounds: Normal heart sounds.   Pulmonary:      Effort: Pulmonary effort is normal. No respiratory distress.      Breath sounds: Normal breath sounds.   Abdominal:      General: Abdomen is flat. Bowel sounds are normal. There is no distension.      Palpations: Abdomen is soft.      Tenderness: There is abdominal tenderness (diffuse mostly lower quadrants and left upper). There is no guarding.   Musculoskeletal:         General: Normal range of motion.      Cervical back: Normal range of motion.      Right lower leg: No edema.      Left lower leg: No edema.   Skin:     General: Skin is warm and dry.   Neurological:      General: No focal deficit present.      Mental Status: He is alert and oriented to person, place, and time. Mental status is at baseline.   Psychiatric:         Mood and Affect: Mood normal.                Significant Labs: All pertinent labs within the past 24 hours have been reviewed.  CBC:   Recent Labs   Lab 07/15/24  1737   WBC 16.73*   HGB 11.8*   HCT 38.9*        CMP:   Recent Labs   Lab 07/15/24  1737      K 5.0      CO2 26   GLU 93   BUN 11   CREATININE 1.0   CALCIUM 8.8   PROT 6.5   ALBUMIN 3.6   BILITOT 0.3   ALKPHOS 66   AST 37   ALT 45*   ANIONGAP 6*     Lipase:   Recent Labs   Lab 07/15/24  1737   LIPASE 21     Magnesium:   Recent Labs   Lab 07/15/24  1737   MG 1.5*     Troponin:   Recent Labs   Lab 07/15/24  1737   TROPONINIHS 4.9     TSH:   Recent Labs   Lab 07/15/24  1737   TSH 1.132     Urine Studies:   Recent Labs   Lab 07/15/24  1752   COLORU Colorless*   APPEARANCEUA Clear   PHUR 6.0   SPECGRAV 1.015   PROTEINUA Negative   GLUCUA Negative   KETONESU Negative   BILIRUBINUA Negative   OCCULTUA Negative   NITRITE Negative   UROBILINOGEN Negative   LEUKOCYTESUR Negative       Significant Imaging: I have reviewed all pertinent imaging results/findings within the past 24 hours.  Assessment/Plan:     * Colitis  Strong history of  recurrent ulcerative colitis, WBC 16 compared to previous last month of 14  With 10 or more episodes of diarrhea per day, nausea, B lower quadrant abdominal pain  CT of the abdomen shows continued mild bowel wall thickening and inflammatory changes predominantly involving the sigmoid colon.  C diff negative, WBC negative, occult negative.  Continue home regimen  Clear liquid diet for now, advance as tolerated  GI consulted      Hypomagnesemia  Patient has Abnormal Magnesium: hypomagnesemia. Will continue to monitor electrolytes closely. Will replace the affected electrolytes and repeat labs to be done after interventions completed. The patient's magnesium results have been reviewed and are listed below.  Recent Labs   Lab 07/15/24  1737   MG 1.5*        Dehydration  Strict I's and O's, IV fluid hydration      Anemia  Patient's anemia is currently stable. Has not received any PRBCs to date.   Current CBC reviewed-   Lab Results   Component Value Date    HGB 11.8 (L) 07/15/2024    HCT 38.9 (L) 07/15/2024     Monitor serial CBC and transfuse if patient becomes hemodynamically unstable, symptomatic or H/H drops below 7/21.    Type 2 diabetes mellitus without complication, without long-term current use of insulin  POCT glucose, insulin sliding scale      VTE Risk Mitigation (From admission, onward)           Ordered     enoxaparin injection 40 mg  Daily         07/15/24 2203     IP VTE HIGH RISK PATIENT  Once         07/15/24 2203     Place sequential compression device  Until discontinued         07/15/24 2203                  Patient will be admitted to hospital medicine in collaboration with Dr. Fitzgerald.      REENA Tinajero  Department of Hospital Medicine  [facility] - Emergency Dept

## 2024-07-16 NOTE — ASSESSMENT & PLAN NOTE
Body mass index is 47.33 kg/m². Morbid obesity complicates all aspects of disease management from diagnostic modalities to treatment. Weight loss encouraged and health benefits explained to patient.

## 2024-07-16 NOTE — ASSESSMENT & PLAN NOTE
Strong history of recurrent ulcerative colitis, WBC 16 compared to previous last month of 14  With 10 or more episodes of diarrhea per day, nausea, B lower quadrant abdominal pain  CT of the abdomen shows continued mild bowel wall thickening and inflammatory changes predominantly involving the sigmoid colon.  C diff negative, WBC negative, occult negative.  Continue home regimen  Clear liquid diet for now, advance as tolerated  GI consulted

## 2024-07-16 NOTE — ANESTHESIA PREPROCEDURE EVALUATION
07/16/2024  Jacoby Jean-Baptiste is a 49 y.o., male.    Results for orders placed or performed during the hospital encounter of 07/15/24   EKG 12-lead    Collection Time: 07/15/24  9:37 PM   Result Value Ref Range    QRS Duration 72 ms    OHS QTC Calculation 454 ms    Narrative    Test Reason : R10.9,    Vent. Rate : 099 BPM     Atrial Rate : 099 BPM     P-R Int : 124 ms          QRS Dur : 072 ms      QT Int : 354 ms       P-R-T Axes : 039 022 053 degrees     QTc Int : 454 ms    Normal sinus rhythm  Normal ECG  When compared with ECG of 30-JAN-2024 10:26,  No significant change was found    Referred By: [email or web address]   SELF           Confirmed By:         Imaging Results              CT Abdomen Pelvis With IV Contrast Routine Oral Contrast (Final result)  Result time 07/15/24 20:51:10      Final result by Kirk Dooley MD (07/15/24 20:51:10)                   Impression:      Continued mild bowel wall thickening and inflammatory change predominantly involving the sigmoid colon, similar in extent to prior study of 07/06/2024.  This may reflect a nonspecific colitis.  No evidence of abscess or free intraperitoneal air.    Redemonstration of a 0.4 cm calculus at the right ureteropelvic junction, similar to prior study.  No significant hydronephrosis appreciated.    Bilateral nonobstructing nephrolithiasis.    Hepatomegaly.  Cholecystectomy.    Additional findings as above.      Electronically signed by: Kirk Dooley MD  Date:    07/15/2024  Time:    20:51               Narrative:    EXAMINATION:  CT ABDOMEN PELVIS WITH IV CONTRAST    CLINICAL HISTORY:  LLQ abdominal pain;    TECHNIQUE:  Low dose axial images, sagittal and coronal reformations were obtained from the lung bases to the pubic symphysis following the IV administration of 100 mL of Omnipaque 350 .  Oral contrast was not given.    COMPARISON:  CT  07/06/2024    FINDINGS:  The lung bases are unremarkable.  There is no pleural fluid present.  The visualized portions of the heart appear normal.    The liver is mildly enlarged.  No focal hepatic abnormality identified.  The gallbladder is surgically absent.  There is no intra-or extrahepatic biliary ductal dilatation.    The stomach, spleen, pancreas, and adrenal glands are unremarkable.  There is a small duodenum diverticulum incidentally noted.    Kidneys are normal in size and location.  There is a stable appearing 0.4 cm calculus again identified at the right UPJ, similar position to prior study.  No significant right-sided hydronephrosis appreciated.  Stable additional punctate nonobstructing right-sided nephrolithiasis.  The left kidney demonstrates several nonobstructing renal calculi.  No significant left-sided hydronephrosis or obstructing left-sided ureteral calculus appreciated.  Urinary bladder appears within normal limits.  Prostate is unremarkable.    The abdominal aorta is normal in course and caliber with mild atherosclerotic calcification along its course.  There is no retroperitoneal hematoma.    There is no evidence of small-bowel obstruction.  There is continued mild colonic wall thickening and inflammatory change predominantly involving the sigmoid colon, similar to prior study.  There is no evidence of abscess.  There is no free intraperitoneal air or portal venous gas.  No CT evidence of acute appendicitis.  There are scattered shotty small mesenteric and retroperitoneal lymph nodes.    Osseous structures demonstrate degenerative changes.  The extraperitoneal soft tissues are unremarkable.                                       Lab Results   Component Value Date    WBC 14.25 (H) 07/16/2024    HGB 10.7 (L) 07/16/2024    HCT 34.4 (L) 07/16/2024    MCV 98 07/16/2024     07/16/2024     BMP  Lab Results   Component Value Date     07/16/2024    K 3.9 07/16/2024     07/16/2024     CO2 25 07/16/2024    BUN 9 07/16/2024    CREATININE 0.8 07/16/2024    CALCIUM 8.3 (L) 07/16/2024    ANIONGAP 6 (L) 07/16/2024    GLU 87 07/16/2024    GLU 93 07/15/2024     (H) 07/06/2024       Results for orders placed during the hospital encounter of 11/17/23    Echo    Interpretation Summary    Left Ventricle: The left ventricle is normal in size. Normal wall thickness. There is concentric remodeling. Normal wall motion. There is normal systolic function with a visually estimated ejection fraction of 55 - 60%. There is normal diastolic function.    Right Ventricle: Normal right ventricular cavity size. Wall thickness is normal. Right ventricle wall motion  is normal. Systolic function is normal.    IVC/SVC: Normal venous pressure at 3 mmHg.          Pre-op Assessment    I have reviewed the Patient Summary Reports.     I have reviewed the Nursing Notes. I have reviewed the NPO Status.   I have reviewed the Medications.     Review of Systems  Anesthesia Hx:  No problems with previous Anesthesia             Denies Family Hx of Anesthesia complications.    Denies Personal Hx of Anesthesia complications.                    Social:  Former Smoker, Alcohol Use       Hematology/Oncology:    Oncology Normal    -- Anemia:                                  EENT/Dental:  EENT/Dental Normal           Cardiovascular:     Hypertension, poorly controlled    Dysrhythmias       hyperlipidemia   ECG has been reviewed. History of supraventricular tachycardia  S/P radiofrequency ablation operation for arrhythmia                         Renal/:  Chronic Renal Disease renal calculi       Kidney Function/Disease             Hepatic/GI:  Bowel Prep. PUD,   Liver Disease,  History of C. difficile colitis  Patient denies nausea and vomiting      Bowel Conditions:  Inflammatory Bowel Disease, Ulcerative Colitis     Liver Disease        Musculoskeletal:  Musculoskeletal Normal                Neurological:    Neuromuscular Disease,        Left-sided weakness                            Endocrine:  Diabetes, well controlled, type 2         Morbid Obesity / BMI > 40  Psych:  Psychiatric History anxiety depression Sleep Disorder and Insomnia.       Sleep Disorder and Insomnia.        Physical Exam  General: Well nourished, Cooperative, Alert and Oriented    Airway:  Mallampati: III   Mouth Opening: Normal  TM Distance: > 6 cm  Tongue: Normal  Neck ROM: Normal ROM    Dental:  Periodontal disease  Multiple chipped and broken   Patient denies loose teeth  Chest/Lungs:  Clear to auscultation    Heart:  Rate: Normal  Rhythm: Regular Rhythm  Sounds: Normal        Anesthesia Plan  Type of Anesthesia, risks & benefits discussed:    Anesthesia Type: Gen Natural Airway  Intra-op Monitoring Plan: Standard ASA Monitors  Post Op Pain Control Plan:   (medical reason for not using multimodal pain management)  Induction:  IV  Informed Consent: Informed consent signed with the Patient and all parties understand the risks and agree with anesthesia plan.  All questions answered.   ASA Score: 4  Anesthesia Plan Notes:       GNA  POM  Propofol     Ready For Surgery From Anesthesia Perspective.     .

## 2024-07-16 NOTE — ANESTHESIA POSTPROCEDURE EVALUATION
Anesthesia Post Evaluation    Patient: Jacoby Jean-Baptiste    Procedure(s) Performed: Procedure(s) (LRB):  SIGMOIDOSCOPY, FLEXIBLE (N/A)    Final Anesthesia Type: general      Patient location during evaluation: GI PACU  Patient participation: Yes- Able to Participate  Level of consciousness: awake and alert  Post-procedure vital signs: reviewed and stable  Pain management: adequate  Airway patency: patent    PONV status at discharge: No PONV  Anesthetic complications: no      Cardiovascular status: blood pressure returned to baseline and stable  Respiratory status: unassisted and room air  Hydration status: euvolemic  Follow-up not needed.              Vitals Value Taken Time   /66 07/16/24 1345   Temp 97.7 07/16/24 1351   Pulse 72 07/16/24 1349   Resp 18 07/16/24 1349   SpO2 100 % 07/16/24 1348   Vitals shown include unfiled device data.      No case tracking events are documented in the log.      Pain/Kusum Score: Pain Rating Prior to Med Admin: 10 (7/16/2024 11:39 AM)  Pain Rating Post Med Admin: 3 (7/16/2024 12:39 PM)  Kusum Score: 10 (7/16/2024  1:46 PM)

## 2024-07-16 NOTE — PLAN OF CARE
[facility]  Initial Discharge Assessment    Assessment completed at bedside with Pt, and all information on FaceSheet confirmed, including demographics, PCP, pharmacy and insurance. Pt has not addressed advance directives. He currently lives in [place] by himself.  His PCP is Hunter Aguilar MD, and last appointment was 7/12/24. His preferred pharmacy is [facility] in [place]. He denies any DME/HH/HD/Blood Thinners. Pt will transport himself back home after discharge. He denies any recent hospitalizations. Plan for discharge is to return home. Case Management to continue to follow for discharge planning needs.        Primary Care Provider: Hunter Aguilar III, MD    Admission Diagnosis: Colitis [K52.9]    Admission Date: 7/15/2024  Expected Discharge Date: 7/19/2024    Transition of Care Barriers: None    Payor: [employer] / Plan: [employer] ([employer]) / Product Type: Commercial /     Extended Emergency Contact Information  Primary Emergency Contact: Estiven (Sister-In-Law)Jessica  Address: [address] [place] [address] [facility]  Home Phone: [phone]  Mobile Phone: [phone]  Relation: Sister  Preferred language: English   needed? No  Secondary Emergency Contact: Dallas Jean-Baptiste   [place]  Mobile Phone: [phone]  Relation: Brother  Preferred language: English   needed? No    Discharge Plan A: Home  Discharge Plan B: Home      [id number] - [place], [place] - [address] AT [facility] [address] [facility] [address]  [address]  [place] [address]  Phone: [phone] Fax: [phone]    [facility] - [place], [place] - [address]  [address] [facility] [address]  Phone: [phone] Fax: [phone]    [facility]  [address]  [facility] [address]  Phone: [phone] Fax: [phone]      Initial Assessment (most recent)        Adult Discharge Assessment - 07/16/24 1322          Discharge Assessment    Assessment Type Discharge Planning Assessment     Confirmed/corrected address, phone number and insurance Yes     Confirmed Demographics Correct on Facesheet     Source of Information patient     When was your last doctors appointment? 07/12/24     Does patient/caregiver understand observation status Yes     Reason For Admission Colitis     People in Home alone     Do you expect to return to your current living situation? Yes     Do you have help at home or someone to help you manage your care at home? Yes     Who are your caregiver(s) and their phone number(s)? Estiven (Sister-In-Law),Jessica (Sister)  [phone] (Mobile)     Prior to hospitilization cognitive status: Alert/Oriented     Current cognitive status: Alert/Oriented     Walking or Climbing Stairs Difficulty no     Dressing/Bathing Difficulty no     Home Accessibility wheelchair accessible     Home Layout Able to live on 1st floor     Equipment Currently Used at Home none     Readmission within 30 days? No     Patient currently being followed by outpatient case management? No     Do you currently have service(s) that help you manage your care at home? No     Do you take prescription medications? Yes     Do you have prescription coverage? Yes     Coverage [employer] ([employer])     Do you have any problems affording any of your prescribed medications? No     Is the patient taking medications as prescribed? yes     Who is going to help you get home at discharge? Self     How do you get to doctors appointments? car, drives self     Are you on dialysis? No     Do you take coumadin? No     Discharge Plan A Home     Discharge Plan B Home     DME Needed Upon Discharge  none     Discharge Plan discussed with: Patient     Transition of Care Barriers None

## 2024-07-16 NOTE — ASSESSMENT & PLAN NOTE
Patient's FSGs are controlled on current medication regimen.  Last A1c reviewed-   Lab Results   Component Value Date    HGBA1C 5.2 06/06/2024     Most recent fingerstick glucose reviewed-   POC Glucose   Date Value Ref Range Status   07/16/2024 73 70 - 110 Final   07/16/2024 97 70 - 110 Final   07/15/2024 102 70 - 110 Final   05/09/2024 156 (H) 70 - 110 Final       Current correctional scale  Low  Maintain anti-hyperglycemic dose as follows-   Antihyperglycemics (From admission, onward)      Start     Stop Route Frequency Ordered    07/15/24 2301  insulin aspart U-100 pen 0-5 Units         -- SubQ Before meals & nightly PRN 07/15/24 2203          Hold Oral hypoglycemics while patient is in the hospital.

## 2024-07-16 NOTE — ASSESSMENT & PLAN NOTE
Patient has Abnormal Magnesium: hypomagnesemia. Will continue to monitor electrolytes closely. Will replace the affected electrolytes and repeat labs to be done after interventions completed. The patient's magnesium results have been reviewed and are listed below.  Recent Labs   Lab 07/15/24  1737   MG 1.5*

## 2024-07-16 NOTE — PROGRESS NOTES
Pharmacist Renal Dose Adjustment Note    Jacoby Jean-Baptiste is a 49 y.o. male being treated with the medication ENOXAPARIN    Patient Data:    Vital Signs (Most Recent):  Temp: 98.5 °F (36.9 °C) (07/15/24 1640)  Pulse: 94 (07/15/24 2114)  Resp: 16 (07/15/24 2114)  BP: (!) 163/80 (07/15/24 2110)  SpO2: 97 % (07/15/24 2114) Vital Signs (72h Range):  Temp:  [98.5 °F (36.9 °C)]   Pulse:  []   Resp:  [16-20]   BP: (151-170)/()   SpO2:  [97 %-99 %]      Recent Labs   Lab 07/15/24  1737   CREATININE 1.0     Serum creatinine: 1 mg/dL 07/15/24 1737  Estimated creatinine clearance: 108.3 mL/min    ENOXAPARIN 40 MG EVERY 24 HR will be changed to ENOXAPARIN 40 MG EVERY 12 HR DUE TO BMI.40    Pharmacist's Name: Angie Watson  Pharmacist's Extension: [phone]

## 2024-07-16 NOTE — PROVATION PATIENT INSTRUCTIONS
Discharge Summary/Instructions after an Endoscopic Procedure  Patient Name: Jacoby Jean-Baptiste  Patient MRN: [id number]  Patient YOB: 1975 Tuesday, July 16, 2024  Shan Jean III, MD  RESTRICTIONS:  During your procedure today, you received medications for sedation.  These   medications may affect your judgment, balance and coordination.  Therefore,   for 24 hours, you have the following restrictions:   - DO NOT drive a car, operate machinery, make legal/financial decisions,   sign important papers or drink alcohol.    ACTIVITY:  Today: no heavy lifting, straining or running due to procedural   sedation/anesthesia.  The following day: return to full activity including work.  DIET:  Eat and drink normally unless instructed otherwise.     TREATMENT FOR COMMON SIDE EFFECTS:  - Mild abdominal pain, nausea, belching, bloating or excessive gas:  rest,   eat lightly and use a heating pad.  - Sore Throat: treat with throat lozenges and/or gargle with warm salt   water.  - Because air was used during the procedure, expelling large amounts of air   from your rectum or belching is normal.  - If a bowel prep was taken, you may not have a bowel movement for 1-3 days.    This is normal.  SYMPTOMS TO WATCH FOR AND REPORT TO YOUR PHYSICIAN:  1. Abdominal pain or bloating, other than gas cramps.  2. Chest pain.  3. Back pain.  4. Signs of infection such as: chills or fever occurring within 24 hours   after the procedure.  5. Rectal bleeding, which would show as bright red, maroon, or black stools.   (A tablespoon of blood from the rectum is not serious, especially if   hemorrhoids are present.)  6. Vomiting.  7. Weakness or dizziness.  GO DIRECTLY TO THE NEAREST EMERGENCY ROOM IF YOU HAVE ANY OF THE FOLLOWING:      Difficulty breathing              Chills and/or fever over 101 F   Persistent vomiting and/or vomiting blood   Severe abdominal pain   Severe chest pain   Black, tarry stools   Bleeding- more than one  tablespoon   Any other symptom or condition that you feel may need urgent attention  Your doctor recommends these additional instructions:  If any biopsies were taken, your doctors clinic will contact you in 1 to 2   weeks with any results.  - Patient has a contact number available for emergencies.  The signs and   symptoms of potential delayed complications were discussed with the   patient.  Return to normal activities tomorrow.  Written discharge   instructions were provided to the patient.   - Resume previous diet.   - Continue present medications. Continue Omvoq and will given trial of   xifaxan to see if improves diarrhea.   - No recommendation at this time regarding repeat colonoscopy.   - Return patient to hospital [facility] for ongoing care.  For questions, problems or results please call your physician - Shan Jean III, MD at Work:  [phone].  [facility], EMERGENCY ROOM PHONE NUMBER: [phone]  IF A COMPLICATION OR EMERGENCY SITUATION ARISES AND YOU ARE UNABLE TO REACH   YOUR PHYSICIAN - GO DIRECTLY TO THE EMERGENCY ROOM.  Shan Jean III, MD  7/16/2024 1:47:48 PM  This report has been verified and signed electronically.  Dear patient,  As a result of recent federal legislation (The Federal Cures Act), you may   receive lab or pathology results from your procedure in your [facility]   account before your physician is able to contact you. Your physician or   their representative will relay the results to you with their   recommendations at their soonest availability.  Thank you,  PROVATION

## 2024-07-17 VITALS
TEMPERATURE: 99 F | WEIGHT: 285 LBS | DIASTOLIC BLOOD PRESSURE: 63 MMHG | SYSTOLIC BLOOD PRESSURE: 148 MMHG | OXYGEN SATURATION: 95 % | BODY MASS INDEX: 47.48 KG/M2 | HEIGHT: 65 IN | HEART RATE: 81 BPM | RESPIRATION RATE: 15 BRPM

## 2024-07-17 LAB
ALBUMIN SERPL BCP-MCNC: 3.4 G/DL (ref 3.5–5.2)
ALP SERPL-CCNC: 65 U/L (ref 55–135)
ALT SERPL W/O P-5'-P-CCNC: 25 U/L (ref 10–44)
ANION GAP SERPL CALC-SCNC: 7 MMOL/L (ref 8–16)
AST SERPL-CCNC: 9 U/L (ref 10–40)
BASOPHILS # BLD AUTO: 0.05 K/UL (ref 0–0.2)
BASOPHILS NFR BLD: 0.4 % (ref 0–1.9)
BILIRUB SERPL-MCNC: 0.4 MG/DL (ref 0.1–1)
BUN SERPL-MCNC: 8 MG/DL (ref 6–20)
CALCIUM SERPL-MCNC: 8.8 MG/DL (ref 8.7–10.5)
CHLORIDE SERPL-SCNC: 103 MMOL/L (ref 95–110)
CO2 SERPL-SCNC: 27 MMOL/L (ref 23–29)
CREAT SERPL-MCNC: 0.8 MG/DL (ref 0.5–1.4)
DIFFERENTIAL METHOD BLD: ABNORMAL
EOSINOPHIL # BLD AUTO: 0.2 K/UL (ref 0–0.5)
EOSINOPHIL NFR BLD: 1.7 % (ref 0–8)
ERYTHROCYTE [DISTWIDTH] IN BLOOD BY AUTOMATED COUNT: 16.4 % (ref 11.5–14.5)
EST. GFR  (NO RACE VARIABLE): >60 ML/MIN/1.73 M^2
GLUCOSE SERPL-MCNC: 109 MG/DL (ref 70–110)
GLUCOSE SERPL-MCNC: 94 MG/DL (ref 70–110)
GLUCOSE SERPL-MCNC: 97 MG/DL (ref 70–110)
HCT VFR BLD AUTO: 38.1 % (ref 40–54)
HGB BLD-MCNC: 11.9 G/DL (ref 14–18)
IMM GRANULOCYTES # BLD AUTO: 0.16 K/UL (ref 0–0.04)
IMM GRANULOCYTES NFR BLD AUTO: 1.4 % (ref 0–0.5)
LYMPHOCYTES # BLD AUTO: 1.8 K/UL (ref 1–4.8)
LYMPHOCYTES NFR BLD: 15.4 % (ref 18–48)
MAGNESIUM SERPL-MCNC: 1.6 MG/DL (ref 1.6–2.6)
MCH RBC QN AUTO: 30.7 PG (ref 27–31)
MCHC RBC AUTO-ENTMCNC: 31.2 G/DL (ref 32–36)
MCV RBC AUTO: 98 FL (ref 82–98)
MONOCYTES # BLD AUTO: 0.9 K/UL (ref 0.3–1)
MONOCYTES NFR BLD: 7.5 % (ref 4–15)
NEUTROPHILS # BLD AUTO: 8.4 K/UL (ref 1.8–7.7)
NEUTROPHILS NFR BLD: 73.6 % (ref 38–73)
NRBC BLD-RTO: 0 /100 WBC
PHOSPHATE SERPL-MCNC: 3.2 MG/DL (ref 2.7–4.5)
PLATELET # BLD AUTO: 211 K/UL (ref 150–450)
PMV BLD AUTO: 10.2 FL (ref 9.2–12.9)
POTASSIUM SERPL-SCNC: 4.6 MMOL/L (ref 3.5–5.1)
PROT SERPL-MCNC: 6.2 G/DL (ref 6–8.4)
RBC # BLD AUTO: 3.88 M/UL (ref 4.6–6.2)
SODIUM SERPL-SCNC: 137 MMOL/L (ref 136–145)
WBC # BLD AUTO: 11.47 K/UL (ref 3.9–12.7)

## 2024-07-17 PROCEDURE — 85025 COMPLETE CBC W/AUTO DIFF WBC: CPT | Performed by: PHYSICAL THERAPY ASSISTANT

## 2024-07-17 PROCEDURE — 25000003 PHARM REV CODE 250: Performed by: PHYSICAL THERAPY ASSISTANT

## 2024-07-17 PROCEDURE — 84100 ASSAY OF PHOSPHORUS: CPT | Performed by: PHYSICAL THERAPY ASSISTANT

## 2024-07-17 PROCEDURE — 63600175 PHARM REV CODE 636 W HCPCS: Performed by: PHYSICAL THERAPY ASSISTANT

## 2024-07-17 PROCEDURE — 25000003 PHARM REV CODE 250: Performed by: HOSPITALIST

## 2024-07-17 PROCEDURE — 25000003 PHARM REV CODE 250: Performed by: INTERNAL MEDICINE

## 2024-07-17 PROCEDURE — 99223 1ST HOSP IP/OBS HIGH 75: CPT | Mod: ,,, | Performed by: SURGERY

## 2024-07-17 PROCEDURE — 36415 COLL VENOUS BLD VENIPUNCTURE: CPT | Performed by: PHYSICAL THERAPY ASSISTANT

## 2024-07-17 PROCEDURE — 83735 ASSAY OF MAGNESIUM: CPT | Performed by: PHYSICAL THERAPY ASSISTANT

## 2024-07-17 PROCEDURE — 80053 COMPREHEN METABOLIC PANEL: CPT | Performed by: PHYSICAL THERAPY ASSISTANT

## 2024-07-17 RX ORDER — HYDROCODONE BITARTRATE AND ACETAMINOPHEN 5; 325 MG/1; MG/1
1 TABLET ORAL EVERY 6 HOURS PRN
Qty: 20 TABLET | Refills: 0 | Status: SHIPPED | OUTPATIENT
Start: 2024-07-17

## 2024-07-17 RX ADMIN — CHOLESTYRAMINE LIGHT 4 G: 4 POWDER, FOR SUSPENSION ORAL at 09:07

## 2024-07-17 RX ADMIN — BUSPIRONE HYDROCHLORIDE 10 MG: 5 TABLET ORAL at 03:07

## 2024-07-17 RX ADMIN — BUDESONIDE 3 MG: 3 CAPSULE, COATED PELLETS ORAL at 09:07

## 2024-07-17 RX ADMIN — METOPROLOL SUCCINATE 50 MG: 50 TABLET, FILM COATED, EXTENDED RELEASE ORAL at 09:07

## 2024-07-17 RX ADMIN — RIFAXIMIN 550 MG: 550 TABLET ORAL at 03:07

## 2024-07-17 RX ADMIN — IBUPROFEN 600 MG: 200 TABLET, FILM COATED ORAL at 03:07

## 2024-07-17 RX ADMIN — ACETAMINOPHEN 650 MG: 325 TABLET ORAL at 02:07

## 2024-07-17 RX ADMIN — HYOSCYAMINE SULFATE 0.25 MG: 0.12 TABLET SUBLINGUAL at 09:07

## 2024-07-17 RX ADMIN — ENOXAPARIN SODIUM 40 MG: 40 INJECTION SUBCUTANEOUS at 09:07

## 2024-07-17 RX ADMIN — SERTRALINE HYDROCHLORIDE 100 MG: 50 TABLET ORAL at 09:07

## 2024-07-17 RX ADMIN — HYOSCYAMINE SULFATE 0.25 MG: 0.12 TABLET SUBLINGUAL at 03:07

## 2024-07-17 RX ADMIN — BUSPIRONE HYDROCHLORIDE 10 MG: 5 TABLET ORAL at 09:07

## 2024-07-17 RX ADMIN — RIFAXIMIN 550 MG: 550 TABLET ORAL at 09:07

## 2024-07-17 RX ADMIN — HYDROCODONE BITARTRATE AND ACETAMINOPHEN 1 TABLET: 5; 325 TABLET ORAL at 09:07

## 2024-07-17 NOTE — PLAN OF CARE
Problem: Adult Inpatient Plan of Care  Goal: Plan of Care Review  Outcome: Progressing  Goal: Patient-Specific Goal (Individualized)  Outcome: Progressing  Goal: Optimal Comfort and Wellbeing  Outcome: Progressing     Problem: Bariatric Environmental Safety  Goal: Safety Maintained with Care  Outcome: Progressing     Problem: Infection  Goal: Absence of Infection Signs and Symptoms  Outcome: Progressing

## 2024-07-17 NOTE — HOSPITAL COURSE
Patient was admitted to the hospital for diarrhea and abdominal pain.  He was known to have history of ulcerative colitis and has had multiple admissions for such.  Patient was started on pain medication as well as medication for diarrhea.  He had mild bloody diarrhea.  Gastroenterology was consulted, and patient underwent colonoscopy which showed improving ulcerative colitis.  Subsequently, the patient was discharged home with follow-up with Gastroenterology.  Patient requested general surgery consultation while in the hospital.  He was started on rifaximin per Gastroenterology.

## 2024-07-17 NOTE — PLAN OF CARE
Discharge orders and chart reviewed with no further post-acute discharge needs identified at this time.     Pt will transport himself home. Follow up appointments added to AVS.    At this time, patient is cleared for discharge from Case Management standpoint.           07/17/24 1203   Final Note   Assessment Type Final Discharge Note   Anticipated Discharge Disposition Home   What phone number can be called within the next 1-3 days to see how you are doing after discharge? [phone]   Post-Acute Status   Coverage [employer]   Discharge Delays None known at this time

## 2024-07-17 NOTE — HPI
This is a pleasant 49-year-old gentleman who was admitted to the hospital with a flare of ulcerative colitis.  Patient has been managed medically for his ulcerative colitis for some time.  Initially was on Remicade but had complications resulting in C diff colitis.  Ultimately was started on Entyvio.  Patient had CT scan on presentation demonstrating mild inflammatory changes.  Patient also had endoscopy yesterday demonstrating improvement in the appearance of the inflammation in the rectum and sigmoid colon.  Patient was curious about surgical intervention and therefore surgery was consulted.  He has had previous cholecystectomy and hernia repair.  His past medical history is also significant for morbid obesity and hypertension.

## 2024-07-17 NOTE — ASSESSMENT & PLAN NOTE
Patient with longstanding history of ulcerative colitis.  Have had lengthy discussion with him regarding surgical options.  Explained to him this would typically consist of a total proctocolectomy with creation of an ileal pouch anal anastomosis.  Would likely be done in 1 2 or 3 stages depending on the urgency in severity of the bowel at the time of surgery.  At present given the overall clinical improvement of his symptoms stable vital signs he was not fulminant colitis it would not meet criteria for emergent or urgent surgical intervention.  I do believe it was reasonable to discharge him home.  He can follow up with me as an outpatient to discuss surgical management further.  I did explain to him that it was my opinion it would behoove him to lose a significant amount of weight prior to any surgical intervention.  He expressed understanding.  He will follow up with me on a p.r.n. basis

## 2024-07-17 NOTE — SUBJECTIVE & OBJECTIVE
Current Facility-Administered Medications on File Prior to Encounter   Medication    lactated ringers infusion     Current Outpatient Medications on File Prior to Encounter   Medication Sig    budesonide (ENTOCORT EC) 3 mg capsule Take 3 mg by mouth once daily.    busPIRone (BUSPAR) 10 MG tablet Take 1 tablet (10 mg total) by mouth 3 (three) times daily.    cholestyramine (QUESTRAN) 4 gram packet Take 1 packet by mouth once daily.    hyoscyamine (LEVBID) 0.375 mg Tb12 Take 375 mcg by mouth 2 (two) times daily.    LORazepam (ATIVAN) 0.5 MG tablet Take 1 tablet (0.5 mg total) by mouth every 12 (twelve) hours as needed for Anxiety.    metFORMIN (GLUCOPHAGE-XR) 500 MG ER 24hr tablet Take 1 tablet (500 mg total) by mouth 2 (two) times daily with meals.    metoprolol succinate (TOPROL-XL) 50 MG 24 hr tablet Take 1 tablet (50 mg total) by mouth once daily.    sertraline (ZOLOFT) 100 MG tablet Take 1 tablet (100 mg total) by mouth once daily.    simvastatin (ZOCOR) 20 MG tablet Take 1 tablet (20 mg total) by mouth every evening.    triamcinolone acetonide 0.1% (KENALOG) 0.1 % cream Apply topically 2 (two) times daily. (Patient taking differently: Apply 1 g topically 2 (two) times daily.)    zolpidem (AMBIEN) 10 mg Tab Take 1 tablet (10 mg total) by mouth nightly as needed (insomnia).    ergocalciferol (VITAMIN D2) 50,000 unit Cap Take 1 capsule (50,000 Units total) by mouth every 7 days.    lipase-protease-amylase 12,000-38,000-60,000 units (CREON) CpDR Take 3 capsules by mouth 3 (three) times daily with meals. (Patient not taking: Reported on 7/15/2024)    semaglutide (OZEMPIC) 2 mg/dose (8 mg/3 mL) PnIj Inject 2 mg into the skin every 7 days. (Patient taking differently: Inject 2 mg into the skin every 7 days. SATURDAYS)       Review of patient's allergies indicates:  No Known Allergies    Past Medical History:   Diagnosis Date    C. difficile colitis     Cavitary pneumonia 11/2023    pos aspergillus serology    Diabetes  mellitus 01/2022    Hyperlipidemia 2015    Hypertension 2015    Insomnia 2015    Ulcerative colitis      Past Surgical History:   Procedure Laterality Date    BRONCHOSCOPY WITH FLUOROSCOPY Left 11/20/2023    Procedure: BRONCHOSCOPY, WITH FLUOROSCOPY;  Surgeon: Lisa Villarreal MD;  Location: [facility];  Service: Pulmonary;  Laterality: Left;    BRONCHOSCOPY WITH FLUOROSCOPY N/A 11/30/2023    Procedure: BRONCHOSCOPY, WITH FLUOROSCOPY;  Surgeon: Lisa Villarreal MD;  Location: [facility];  Service: Pulmonary;  Laterality: N/A;    CARDIAC ELECTROPHYSIOLOGY MAPPING AND ABLATION  2017    SVT    CHOLECYSTECTOMY  2011    COLONOSCOPY N/A 5/3/2022    Procedure: COLONOSCOPY;  Surgeon: Shan Jean III, MD;  Location: [facility];  Service: Endoscopy;  Laterality: N/A;    COLONOSCOPY N/A 3/16/2024    Procedure: COLONOSCOPY;  Surgeon: ALEKSANDER Ramos MD;  Location: [facility];  Service: Endoscopy;  Laterality: N/A;    COLONOSCOPY WITH FECAL MICROBIOTA TRANSFER N/A 3/21/2023    Procedure: COLONOSCOPY, WITH FECAL MICROBIOTA TRANSFER;  Surgeon: David Millan MD;  [facility];  Service: Endoscopy;  Laterality: N/A;    ESOPHAGOGASTRODUODENOSCOPY N/A 4/24/2023    Procedure: EGD (ESOPHAGOGASTRODUODENOSCOPY);  Surgeon: Shan Jean III, MD;  Location: [facility];  Service: Endoscopy;  Laterality: N/A;    ESOPHAGOGASTRODUODENOSCOPY N/A 6/5/2024    Procedure: EGD (ESOPHAGOGASTRODUODENOSCOPY);  Surgeon: Odalis Mccabe MD;  Location: [facility];  Service: Endoscopy;  Laterality: N/A;    FLEXIBLE SIGMOIDOSCOPY N/A 6/5/2024    Procedure: SIGMOIDOSCOPY, FLEXIBLE;  Surgeon: Odalis Mccabe MD;  Location: [facility];  Service: Endoscopy;  Laterality: N/A;    FLEXIBLE SIGMOIDOSCOPY N/A 7/16/2024    Procedure: SIGMOIDOSCOPY, FLEXIBLE;  Surgeon: Shan Jean III, MD;  Location: [facility];  Service: Endoscopy;  Laterality: N/A;    GANGLION CYST EXCISION Left 1992    UMBILICAL HERNIA REPAIR  2011    with  mesh     Family History       Problem Relation (Age of Onset)    Asthma Mother          Tobacco Use    Smoking status: Former     Current packs/day: 1.00     Average packs/day: 1 pack/day for 31.5 years (31.5 ttl pk-yrs)     Types: Cigarettes     Start date: 1993    Smokeless tobacco: Never    Tobacco comments:     Pt states he has stopped smoking with Chantix three weeks ago. 12/1/23   Substance and Sexual Activity    Alcohol use: Yes     Comment: ocass    Drug use: Not Currently    Sexual activity: Not Currently     Review of Systems   Constitutional:  Negative for activity change and appetite change.   HENT:  Negative for congestion and voice change.    Cardiovascular:  Negative for chest pain.   Gastrointestinal:  Negative for abdominal distention, nausea and vomiting.   Musculoskeletal:  Negative for arthralgias.   Skin:  Negative for color change and wound.   Hematological:  Negative for adenopathy.     Objective:     Vital Signs (Most Recent):  Temp: 98.5 °F (36.9 °C) (07/17/24 1545)  Pulse: 81 (07/17/24 1545)  Resp: 15 (07/17/24 1545)  BP: (!) 148/63 (07/17/24 1545)  SpO2: 95 % (07/17/24 1545) Vital Signs (24h Range):  Temp:  [97.7 °F (36.5 °C)-98.5 °F (36.9 °C)] 98.5 °F (36.9 °C)  Pulse:  [74-86] 81  Resp:  [15-20] 15  SpO2:  [95 %-97 %] 95 %  BP: (101-165)/(62-84) 148/63     Weight: 129.3 kg (285 lb)  Body mass index is 47.43 kg/m².     Physical Exam  Vitals reviewed.   Cardiovascular:      Rate and Rhythm: Normal rate.      Pulses: Normal pulses.   Pulmonary:      Effort: Pulmonary effort is normal.   Abdominal:      General: Abdomen is flat. There is no distension.      Tenderness: There is no abdominal tenderness.      Hernia: No hernia is present.   Neurological:      Mental Status: He is alert.   Psychiatric:         Mood and Affect: Mood normal.            I have reviewed all pertinent lab results within the past 24 hours.  CBC:   Recent Labs   Lab 07/17/24  0440   WBC 11.47   RBC 3.88*   HGB 11.9*    HCT 38.1*      MCV 98   MCH 30.7   MCHC 31.2*     BMP:   Recent Labs   Lab 07/17/24  0440   GLU 97      K 4.6      CO2 27   BUN 8   CREATININE 0.8   CALCIUM 8.8   MG 1.6       Significant Diagnostics:  CT scan reviewed

## 2024-07-17 NOTE — CONSULTS
[facility]  General Surgery  Consult Note    Patient Name: Jacoby Jean-Baptiste  MRN: [id number]  Code Status: Full Code  Admission Date: 7/15/2024  Hospital Length of Stay: 2 days  Attending Physician: Carlos Alberto Sifuentes MD  Primary Care Provider: Hunter Aguilar III, MD    Patient information was obtained from patient and ER records.     Inpatient consult to General Surgery  Consult performed by: Ashok Mohan MD  Consult ordered by: Carlos Alberto Sifuentes MD        Subjective:     Principal Problem: Colitis    History of Present Illness: This is a pleasant 49-year-old gentleman who was admitted to the hospital with a flare of ulcerative colitis.  Patient has been managed medically for his ulcerative colitis for some time.  Initially was on Remicade but had complications resulting in C diff colitis.  Ultimately was started on Entyvio.  Patient had CT scan on presentation demonstrating mild inflammatory changes.  Patient also had endoscopy yesterday demonstrating improvement in the appearance of the inflammation in the rectum and sigmoid colon.  Patient was curious about surgical intervention and therefore surgery was consulted.  He has had previous cholecystectomy and hernia repair.  His past medical history is also significant for morbid obesity and hypertension.      Current Facility-Administered Medications on File Prior to Encounter   Medication    lactated ringers infusion     Current Outpatient Medications on File Prior to Encounter   Medication Sig    budesonide (ENTOCORT EC) 3 mg capsule Take 3 mg by mouth once daily.    busPIRone (BUSPAR) 10 MG tablet Take 1 tablet (10 mg total) by mouth 3 (three) times daily.    cholestyramine (QUESTRAN) 4 gram packet Take 1 packet by mouth once daily.    hyoscyamine (LEVBID) 0.375 mg Tb12 Take 375 mcg by mouth 2 (two) times daily.    LORazepam (ATIVAN) 0.5 MG tablet Take 1 tablet (0.5 mg total) by mouth every 12 (twelve) hours as needed for Anxiety.    metFORMIN  (GLUCOPHAGE-XR) 500 MG ER 24hr tablet Take 1 tablet (500 mg total) by mouth 2 (two) times daily with meals.    metoprolol succinate (TOPROL-XL) 50 MG 24 hr tablet Take 1 tablet (50 mg total) by mouth once daily.    sertraline (ZOLOFT) 100 MG tablet Take 1 tablet (100 mg total) by mouth once daily.    simvastatin (ZOCOR) 20 MG tablet Take 1 tablet (20 mg total) by mouth every evening.    triamcinolone acetonide 0.1% (KENALOG) 0.1 % cream Apply topically 2 (two) times daily. (Patient taking differently: Apply 1 g topically 2 (two) times daily.)    zolpidem (AMBIEN) 10 mg Tab Take 1 tablet (10 mg total) by mouth nightly as needed (insomnia).    ergocalciferol (VITAMIN D2) 50,000 unit Cap Take 1 capsule (50,000 Units total) by mouth every 7 days.    lipase-protease-amylase 12,000-38,000-60,000 units (CREON) CpDR Take 3 capsules by mouth 3 (three) times daily with meals. (Patient not taking: Reported on 7/15/2024)    semaglutide (OZEMPIC) 2 mg/dose (8 mg/3 mL) PnIj Inject 2 mg into the skin every 7 days. (Patient taking differently: Inject 2 mg into the skin every 7 days. SATURDAYS)       Review of patient's allergies indicates:  No Known Allergies    Past Medical History:   Diagnosis Date    C. difficile colitis     Cavitary pneumonia 11/2023    pos aspergillus serology    Diabetes mellitus 01/2022    Hyperlipidemia 2015    Hypertension 2015    Insomnia 2015    Ulcerative colitis      Past Surgical History:   Procedure Laterality Date    BRONCHOSCOPY WITH FLUOROSCOPY Left 11/20/2023    Procedure: BRONCHOSCOPY, WITH FLUOROSCOPY;  Surgeon: Lisa Villarreal MD;  Location: [facility] ENDO;  Service: Pulmonary;  Laterality: Left;    BRONCHOSCOPY WITH FLUOROSCOPY N/A 11/30/2023    Procedure: BRONCHOSCOPY, WITH FLUOROSCOPY;  Surgeon: Lisa Villarreal MD;  Location: [facility] ENDO;  Service: Pulmonary;  Laterality: N/A;    CARDIAC ELECTROPHYSIOLOGY MAPPING AND ABLATION  2017    SVT    CHOLECYSTECTOMY  2011    COLONOSCOPY N/A 5/3/2022     Procedure: COLONOSCOPY;  Surgeon: Shan Jean III, MD;  Location: [facility];  Service: Endoscopy;  Laterality: N/A;    COLONOSCOPY N/A 3/16/2024    Procedure: COLONOSCOPY;  Surgeon: ALEKSANDER Ramos MD;  Location: [facility];  Service: Endoscopy;  Laterality: N/A;    COLONOSCOPY WITH FECAL MICROBIOTA TRANSFER N/A 3/21/2023    Procedure: COLONOSCOPY, WITH FECAL MICROBIOTA TRANSFER;  Surgeon: David Millan MD;  Location: [facility];  Service: Endoscopy;  Laterality: N/A;    ESOPHAGOGASTRODUODENOSCOPY N/A 4/24/2023    Procedure: EGD (ESOPHAGOGASTRODUODENOSCOPY);  Surgeon: Shan Jean III, MD;  Location: [facility];  Service: Endoscopy;  Laterality: N/A;    ESOPHAGOGASTRODUODENOSCOPY N/A 6/5/2024    Procedure: EGD (ESOPHAGOGASTRODUODENOSCOPY);  Surgeon: Odalis Mccabe MD;  Location: [facility];  Service: Endoscopy;  Laterality: N/A;    FLEXIBLE SIGMOIDOSCOPY N/A 6/5/2024    Procedure: SIGMOIDOSCOPY, FLEXIBLE;  Surgeon: Odalis Mccabe MD;  Location: [facility];  Service: Endoscopy;  Laterality: N/A;    FLEXIBLE SIGMOIDOSCOPY N/A 7/16/2024    Procedure: SIGMOIDOSCOPY, FLEXIBLE;  Surgeon: Shan Jean III, MD;  Location: [facility];  Service: Endoscopy;  Laterality: N/A;    GANGLION CYST EXCISION Left 1992    UMBILICAL HERNIA REPAIR  2011    with mesh     Family History       Problem Relation (Age of Onset)    Asthma Mother          Tobacco Use    Smoking status: Former     Current packs/day: 1.00     Average packs/day: 1 pack/day for 31.5 years (31.5 ttl pk-yrs)     Types: Cigarettes     Start date: 1993    Smokeless tobacco: Never    Tobacco comments:     Pt states he has stopped smoking with Chantix three weeks ago. 12/1/23   Substance and Sexual Activity    Alcohol use: Yes     Comment: ocass    Drug use: Not Currently    Sexual activity: Not Currently     Review of Systems   Constitutional:  Negative for activity change and appetite change.   HENT:  Negative for congestion and  voice change.    Cardiovascular:  Negative for chest pain.   Gastrointestinal:  Negative for abdominal distention, nausea and vomiting.   Musculoskeletal:  Negative for arthralgias.   Skin:  Negative for color change and wound.   Hematological:  Negative for adenopathy.     Objective:     Vital Signs (Most Recent):  Temp: 98.5 °F (36.9 °C) (07/17/24 1545)  Pulse: 81 (07/17/24 1545)  Resp: 15 (07/17/24 1545)  BP: (!) 148/63 (07/17/24 1545)  SpO2: 95 % (07/17/24 1545) Vital Signs (24h Range):  Temp:  [97.7 °F (36.5 °C)-98.5 °F (36.9 °C)] 98.5 °F (36.9 °C)  Pulse:  [74-86] 81  Resp:  [15-20] 15  SpO2:  [95 %-97 %] 95 %  BP: (101-165)/(62-84) 148/63     Weight: 129.3 kg (285 lb)  Body mass index is 47.43 kg/m².     Physical Exam  Vitals reviewed.   Cardiovascular:      Rate and Rhythm: Normal rate.      Pulses: Normal pulses.   Pulmonary:      Effort: Pulmonary effort is normal.   Abdominal:      General: Abdomen is flat. There is no distension.      Tenderness: There is no abdominal tenderness.      Hernia: No hernia is present.   Neurological:      Mental Status: He is alert.   Psychiatric:         Mood and Affect: Mood normal.            I have reviewed all pertinent lab results within the past 24 hours.  CBC:   Recent Labs   Lab 07/17/24  0440   WBC 11.47   RBC 3.88*   HGB 11.9*   HCT 38.1*      MCV 98   MCH 30.7   MCHC 31.2*     BMP:   Recent Labs   Lab 07/17/24  0440   GLU 97      K 4.6      CO2 27   BUN 8   CREATININE 0.8   CALCIUM 8.8   MG 1.6       Significant Diagnostics:  CT scan reviewed        Assessment/Plan:     * Colitis  Patient with longstanding history of ulcerative colitis.  Have had lengthy discussion with him regarding surgical options.  Explained to him this would typically consist of a total proctocolectomy with creation of an ileal pouch anal anastomosis.  Would likely be done in 1 2 or 3 stages depending on the urgency in severity of the bowel at the time of surgery.  At  "present given the overall clinical improvement of his symptoms stable vital signs he was not fulminant colitis it would not meet criteria for emergent or urgent surgical intervention.  I do believe it was reasonable to discharge him home.  He can follow up with me as an outpatient to discuss surgical management further.  I did explain to him that it was my opinion it would behoove him to lose a significant amount of weight prior to any surgical intervention.  He expressed understanding.  He will follow up with me on a p.r.n. basis      VTE Risk Mitigation (From admission, onward)           Ordered     enoxaparin injection 40 mg  Every 12 hours        Note to Pharmacy: Ht: 5' 5" (1.651 m)  Wt: 124.3 kg (274 lb)  Estimated Creatinine Clearance: 108.3 mL/min (based on SCr of 1 mg/dL).  Body mass index is 45.6 kg/m².    07/15/24 2203     IP VTE HIGH RISK PATIENT  Once         07/15/24 2203     Place sequential compression device  Until discontinued         07/15/24 2203                    Thank you for your consult. I will follow-up with patient. Please contact us if you have any additional questions.    Ashok Mohan MD  General Surgery  [facility]  "

## 2024-07-18 ENCOUNTER — PATIENT OUTREACH (OUTPATIENT)
Dept: ADMINISTRATIVE | Facility: CLINIC | Age: 49
End: 2024-07-18
Payer: COMMERCIAL

## 2024-07-18 LAB
BACTERIA STL CULT: NORMAL
BACTERIA STL CULT: NORMAL

## 2024-07-18 NOTE — PROGRESS NOTES
"C3 nurse attempted to contact patient for a TCC post hospital discharge follow-up call. The patient declined call at this time, stating he "understands all the discharge instructions".          "

## 2024-07-18 NOTE — DISCHARGE SUMMARY
[facility] Medicine  Discharge Summary      Patient Name: Jacoby Jean-Baptiste  MRN: [id number]  [facility]: [id number]  Patient Class: IP- Inpatient  Admission Date: 7/15/2024  Hospital Length of Stay: 2 days  Discharge Date and Time: 7/17/2024  5:51 PM  Attending Physician: No att. providers found   Discharging Provider: Carlos Alberto Sifuentes MD  Primary Care Provider: Hunter Aguilar III, MD    Primary Care Team: Networked reference to record PCT     HPI:   Patient is a 49-year-old male with a past medical history of ulcerative colitis, diabetes mellitus, hyperlipidemia, hypertension, insomnia, and previous C diff. Patient presents to the ED today with complaints of 2 weeks worsening pain in abdomen.  Patient also reports more than 10 episodes of loose stools per day.  Patient follows with GI Dr. Beck.  CT of the abdomen was completed which shows continued mild bowel wall thickening and inflammatory changes predominantly involving the sigmoid colon, nonspecific colitis.  Patient has had multiple recurrences of colitis with multiple past hospitalizations in the past 1.5 years.  Patient was initiated on Levaquin and Flagyl in the ED. WBC shows mild increased from 14-16 from previous admission last month.  Patient has diffuse tenderness to palpation in the abdomen, tachycardic on arrival.  Patient denies fever, chills, shortness for breath, lower extremity edema, chest pain, dysuria or urinary difficulties.  Patient does endorse recent steroid use of prednisone, has been approximately 3 or 4 weeks since he has taken.     In the ED:  Cdiff negative, occult negative, WBC negative.  Troponin 4.9, TSH 1.132, magnesium 1.5, lipase 21, potassium 5, CRP 1.8, WBC 16.73, hemoglobin 11.8.  UA negative.  UDS negative.    Procedure(s) (LRB):  SIGMOIDOSCOPY, FLEXIBLE (N/A)      Hospital Course:   Patient was admitted to the hospital for diarrhea and abdominal pain.  He was known to have history of ulcerative colitis  and has had multiple admissions for such.  Patient was started on pain medication as well as medication for diarrhea.  He had mild bloody diarrhea.  Gastroenterology was consulted, and patient underwent colonoscopy which showed improving ulcerative colitis.  Subsequently, the patient was discharged home with follow-up with Gastroenterology.  Patient requested general surgery consultation while in the hospital.  He was started on rifaximin per Gastroenterology.     Goals of Care Treatment Preferences:  Code Status: Full Code      Consults:   Consults (From admission, onward)          Status Ordering Provider     Inpatient consult to General Surgery  Once        Provider:  Ashok Mohan MD    Completed DOTTIE ANDRADE     Inpatient consult to Gastroenterology  Once        Provider:  Shan Jean III, MD    Completed MALLORIE CHIN            No new Assessment & Plan notes have been filed under this hospital service since the last note was generated.  Service: Hospital Medicine    Final Active Diagnoses:    Diagnosis Date Noted POA    PRINCIPAL PROBLEM:  Colitis [K52.9] 01/11/2023 Yes    Hypomagnesemia [E83.42] 07/15/2024 Yes    Dehydration [E86.0] 04/20/2023 Yes    Anemia [D64.9] 01/11/2023 Yes     Chronic    Type 2 diabetes mellitus without complication, without long-term current use of insulin [E11.9] 01/29/2022 Yes    Morbid obesity [E66.01] 02/03/2021 Yes      Problems Resolved During this Admission:       Discharged Condition: stable    Disposition: Home or Self Care    Follow Up:   Follow-up Information       Hunter Aguilar III, MD. Go on 7/24/2024.    Specialty: Family Medicine  Why: hospital follow up appointment scheduled at 10:40 AM  Contact information:  [address] [facility]  [address] [place] [address]  [phone]               Shan Jean III, MD Follow up.    Specialty: Gastroenterology  Why: please call clinic to schedule appointment  Contact information:  [address]  [place] [address]  [phone]               Ashok Mohan MD. Schedule an appointment as soon as possible for a visit in 1 month(s).    Specialties: General Surgery, Colon and Rectal Surgery, Surgery  Contact information:  [address]  [address]  [place] [address]  [phone]                           Patient Instructions:      Diet Adult Regular     Notify your health care provider if you experience any of the following:  temperature >100.4     Notify your health care provider if you experience any of the following:  severe uncontrolled pain     Notify your health care provider if you experience any of the following:  difficulty breathing or increased cough     Activity as tolerated       Significant Diagnostic Studies: N/A    Pending Diagnostic Studies:       None           Medications:  Reconciled Home Medications:      Medication List        START taking these medications      HYDROcodone-acetaminophen 5-325 mg per tablet  Commonly known as: NORCO  Take 1 tablet by mouth every 6 (six) hours as needed for Pain (May take 2 tablets for severe pain).     rifAXIMin 550 mg Tab  Commonly known as: XIFAXAN  Take 1 tablet (550 mg total) by mouth 3 (three) times daily.            CONTINUE taking these medications      budesonide 3 mg capsule  Commonly known as: ENTOCORT EC  Take 3 mg by mouth once daily.     busPIRone 10 MG tablet  Commonly known as: BUSPAR  Take 1 tablet (10 mg total) by mouth 3 (three) times daily.     cholestyramine 4 gram packet  Commonly known as: QUESTRAN  Take 1 packet by mouth once daily.     ergocalciferol 50,000 unit Cap  Commonly known as: VITAMIN D2  Take 1 capsule (50,000 Units total) by mouth every 7 days.     hyoscyamine 0.375 mg Tb12  Commonly known as: Levbid  Take 375 mcg by mouth 2 (two) times daily.     LORazepam 0.5 MG tablet  Commonly known as: ATIVAN  Take 1 tablet (0.5 mg total) by mouth every 12 (twelve) hours as needed for Anxiety.     metFORMIN 500 MG ER 24hr  tablet  Commonly known as: GLUCOPHAGE-XR  Take 1 tablet (500 mg total) by mouth 2 (two) times daily with meals.     metoprolol succinate 50 MG 24 hr tablet  Commonly known as: TOPROL-XL  Take 1 tablet (50 mg total) by mouth once daily.     OZEMPIC 2 mg/dose (8 mg/3 mL) Pnij  Generic drug: semaglutide  Inject 2 mg into the skin every 7 days.     sertraline 100 MG tablet  Commonly known as: ZOLOFT  Take 1 tablet (100 mg total) by mouth once daily.     simvastatin 20 MG tablet  Commonly known as: ZOCOR  Take 1 tablet (20 mg total) by mouth every evening.     triamcinolone acetonide 0.1% 0.1 % cream  Commonly known as: KENALOG  Apply topically 2 (two) times daily.     zolpidem 10 mg Tab  Commonly known as: AMBIEN  Take 1 tablet (10 mg total) by mouth nightly as needed (insomnia).            ASK your doctor about these medications      lipase-protease-amylase 12,000-38,000-60,000 units Cpdr  Commonly known as: CREON  Take 3 capsules by mouth 3 (three) times daily with meals.              Indwelling Lines/Drains at time of discharge:   Lines/Drains/Airways       None                   Time spent on the discharge of patient: 37 minutes         Carlos Alberto Sifuentes MD  Department of Hospital Medicine  [facility]

## 2024-07-18 NOTE — DISCHARGE SUMMARY
[facility] Medicine  Discharge Summary      Patient Name: Jacoby Jean-Baptiste  MRN: [id number]  [facility]: [id number]  Patient Class: IP- Inpatient  Admission Date: 7/15/2024  Hospital Length of Stay: 2 days  Discharge Date and Time: 7/17/2024  5:51 PM  Attending Physician: No att. providers found   Discharging Provider: Carlos Alberto Sifuentes MD  Primary Care Provider: Hunter Aguilar III, MD    Primary Care Team: Networked reference to record PCT     HPI:   Patient is a 49-year-old male with a past medical history of ulcerative colitis, diabetes mellitus, hyperlipidemia, hypertension, insomnia, and previous C diff. Patient presents to the ED today with complaints of 2 weeks worsening pain in abdomen.  Patient also reports more than 10 episodes of loose stools per day.  Patient follows with GI Dr. Beck.  CT of the abdomen was completed which shows continued mild bowel wall thickening and inflammatory changes predominantly involving the sigmoid colon, nonspecific colitis.  Patient has had multiple recurrences of colitis with multiple past hospitalizations in the past 1.5 years.  Patient was initiated on Levaquin and Flagyl in the ED. WBC shows mild increased from 14-16 from previous admission last month.  Patient has diffuse tenderness to palpation in the abdomen, tachycardic on arrival.  Patient denies fever, chills, shortness for breath, lower extremity edema, chest pain, dysuria or urinary difficulties.  Patient does endorse recent steroid use of prednisone, has been approximately 3 or 4 weeks since he has taken.     In the ED:  Cdiff negative, occult negative, WBC negative.  Troponin 4.9, TSH 1.132, magnesium 1.5, lipase 21, potassium 5, CRP 1.8, WBC 16.73, hemoglobin 11.8.  UA negative.  UDS negative.    Procedure(s) (LRB):  SIGMOIDOSCOPY, FLEXIBLE (N/A)      Hospital Course:   Patient was admitted to the hospital for diarrhea and abdominal pain.  He was known to have history of ulcerative colitis  and has had multiple admissions for such.  Patient was started on pain medication as well as medication for diarrhea.  He had mild bloody diarrhea.  Gastroenterology was consulted, and patient underwent colonoscopy which showed improving ulcerative colitis.  Subsequently, the patient was discharged home with follow-up with Gastroenterology.  Patient requested general surgery consultation while in the hospital.  He was started on rifaximin per Gastroenterology.     Goals of Care Treatment Preferences:  Code Status: Full Code      Consults:   Consults (From admission, onward)          Status Ordering Provider     Inpatient consult to General Surgery  Once        Provider:  Ashok Mohan MD    Completed DOTTIE ANDRADE     Inpatient consult to Gastroenterology  Once        Provider:  Shan eJan III, MD    Completed MALLORIE CHIN            No new Assessment & Plan notes have been filed under this hospital service since the last note was generated.  Service: Hospital Medicine    Final Active Diagnoses:    Diagnosis Date Noted POA    PRINCIPAL PROBLEM:  Colitis [K52.9] 01/11/2023 Yes    Hypomagnesemia [E83.42] 07/15/2024 Yes    Dehydration [E86.0] 04/20/2023 Yes    Anemia [D64.9] 01/11/2023 Yes     Chronic    Type 2 diabetes mellitus without complication, without long-term current use of insulin [E11.9] 01/29/2022 Yes    Morbid obesity [E66.01] 02/03/2021 Yes      Problems Resolved During this Admission:       Discharged Condition: stable    Disposition: Home or Self Care    Follow Up:   Follow-up Information       Hunter Aguilar III, MD. Go on 7/24/2024.    Specialty: Family Medicine  Why: hospital follow up appointment scheduled at 10:40 AM  Contact information:  [address] [facility]  [address] [place] [id number]  [phone]               Shan Jean III, MD Follow up.    Specialty: Gastroenterology  Why: please call clinic to schedule appointment  Contact information:  [address]  [place] [address]  [phone]               Ashok Mohan MD. Schedule an appointment as soon as possible for a visit in 1 month(s).    Specialties: General Surgery, Colon and Rectal Surgery, Surgery  Contact information:  [address]  [address]  [place] [address]  [phone]                           Patient Instructions:      Diet Adult Regular     Notify your health care provider if you experience any of the following:  temperature >100.4     Notify your health care provider if you experience any of the following:  severe uncontrolled pain     Notify your health care provider if you experience any of the following:  difficulty breathing or increased cough     Activity as tolerated       Significant Diagnostic Studies: N/A    Pending Diagnostic Studies:       None           Medications:  Reconciled Home Medications:      Medication List        START taking these medications      HYDROcodone-acetaminophen 5-325 mg per tablet  Commonly known as: NORCO  Take 1 tablet by mouth every 6 (six) hours as needed for Pain (May take 2 tablets for severe pain).     rifAXIMin 550 mg Tab  Commonly known as: XIFAXAN  Take 1 tablet (550 mg total) by mouth 3 (three) times daily.            CONTINUE taking these medications      budesonide 3 mg capsule  Commonly known as: ENTOCORT EC  Take 3 mg by mouth once daily.     busPIRone 10 MG tablet  Commonly known as: BUSPAR  Take 1 tablet (10 mg total) by mouth 3 (three) times daily.     cholestyramine 4 gram packet  Commonly known as: QUESTRAN  Take 1 packet by mouth once daily.     ergocalciferol 50,000 unit Cap  Commonly known as: VITAMIN D2  Take 1 capsule (50,000 Units total) by mouth every 7 days.     hyoscyamine 0.375 mg Tb12  Commonly known as: Levbid  Take 375 mcg by mouth 2 (two) times daily.     LORazepam 0.5 MG tablet  Commonly known as: ATIVAN  Take 1 tablet (0.5 mg total) by mouth every 12 (twelve) hours as needed for Anxiety.     metFORMIN 500 MG ER 24hr  tablet  Commonly known as: GLUCOPHAGE-XR  Take 1 tablet (500 mg total) by mouth 2 (two) times daily with meals.     metoprolol succinate 50 MG 24 hr tablet  Commonly known as: TOPROL-XL  Take 1 tablet (50 mg total) by mouth once daily.     OZEMPIC 2 mg/dose (8 mg/3 mL) Pnij  Generic drug: semaglutide  Inject 2 mg into the skin every 7 days.     sertraline 100 MG tablet  Commonly known as: ZOLOFT  Take 1 tablet (100 mg total) by mouth once daily.     simvastatin 20 MG tablet  Commonly known as: ZOCOR  Take 1 tablet (20 mg total) by mouth every evening.     triamcinolone acetonide 0.1% 0.1 % cream  Commonly known as: KENALOG  Apply topically 2 (two) times daily.     zolpidem 10 mg Tab  Commonly known as: AMBIEN  Take 1 tablet (10 mg total) by mouth nightly as needed (insomnia).            ASK your doctor about these medications      lipase-protease-amylase 12,000-38,000-60,000 units Cpdr  Commonly known as: CREON  Take 3 capsules by mouth 3 (three) times daily with meals.              Indwelling Lines/Drains at time of discharge:   Lines/Drains/Airways       None                   Time spent on the discharge of patient: 37 minutes         Carlos Alberto Sifuentes MD  Department of Hospital Medicine  [facility]

## 2024-07-19 LAB — CALPROTECTIN STL-MCNT: 1100 UG/G (ref 0–120)

## 2024-07-20 LAB
BACTERIA BLD CULT: NORMAL
BACTERIA BLD CULT: NORMAL

## 2024-07-21 LAB
OHS QRS DURATION: 72 MS
OHS QTC CALCULATION: 454 MS

## 2024-07-26 ENCOUNTER — OFFICE VISIT (OUTPATIENT)
Dept: URGENT CARE | Facility: CLINIC | Age: 49
End: 2024-07-26
Payer: COMMERCIAL

## 2024-07-26 VITALS
HEIGHT: 65 IN | WEIGHT: 277 LBS | OXYGEN SATURATION: 98 % | HEART RATE: 90 BPM | BODY MASS INDEX: 46.15 KG/M2 | RESPIRATION RATE: 16 BRPM | DIASTOLIC BLOOD PRESSURE: 84 MMHG | SYSTOLIC BLOOD PRESSURE: 125 MMHG | TEMPERATURE: 99 F

## 2024-07-26 DIAGNOSIS — L02.91 ABSCESS: Primary | ICD-10-CM

## 2024-07-26 DIAGNOSIS — M79.622 LEFT UPPER ARM PAIN: ICD-10-CM

## 2024-07-26 RX ORDER — CEPHALEXIN 500 MG/1
500 CAPSULE ORAL EVERY 6 HOURS
Qty: 28 CAPSULE | Refills: 0 | Status: SHIPPED | OUTPATIENT
Start: 2024-07-26 | End: 2024-08-02

## 2024-07-26 RX ORDER — KETOROLAC TROMETHAMINE 10 MG/1
10 TABLET, FILM COATED ORAL EVERY 6 HOURS
Qty: 20 TABLET | Refills: 0 | Status: SHIPPED | OUTPATIENT
Start: 2024-07-26 | End: 2024-07-31

## 2024-07-26 RX ORDER — TRAMADOL HYDROCHLORIDE 50 MG/1
50 TABLET ORAL EVERY 6 HOURS
Qty: 8 TABLET | Refills: 0 | Status: SHIPPED | OUTPATIENT
Start: 2024-07-26 | End: 2024-07-26 | Stop reason: CLARIF

## 2024-07-26 NOTE — PATIENT INSTRUCTIONS
Keep area clean and dry    Warm compresses twice a day until resolved.  If the area comes to a head please do not attempt to expresses at home come back in or go to the ER and we can do for you.    Increase hydration with small frequent sips of clear fluids especially while on antibiotics and using Toradol    A probiotic is recommended while taking antibiotics to prevent upset stomach.

## 2024-07-26 NOTE — PROGRESS NOTES
"Subjective:      Patient ID: Jacoby Jean-Baptiste is a 49 y.o. male.    Vitals:  height is 5' 5" (1.651 m) and weight is 125.6 kg (277 lb). His temperature is 98.7 °F (37.1 °C). His blood pressure is 125/84 and his pulse is 90. His respiration is 16 and oxygen saturation is 98%.     Chief Complaint: Abscess    Pt c/o abscess underneath L arm x1 week.  There is a an area of bruising surrounding the erythema from the abscess.  Patient reports he attempted to pop the abscess himself.  There is no true defined area of fluctuance.  There is some firmness and skin induration surrounded by erythema.  Discussed warm compresses and antibiotic treatment versus I and D with strict return precautions.    Abscess  Associated Symptoms: no fever, no chills      Constitution: Negative for chills, fatigue and fever.   HENT: Negative.     Cardiovascular: Negative.    Respiratory: Negative.     Musculoskeletal: Negative.    Skin:  Positive for skin thickening/induration, erythema and abscess (left upper arm).   Neurological: Negative.    Psychiatric/Behavioral: Negative.        Objective:     Physical Exam   Constitutional: He is oriented to person, place, and time. He appears well-developed.   HENT:   Head: Normocephalic and atraumatic. Head is without abrasion, without contusion and without laceration.   Ears:   Right Ear: External ear normal.   Left Ear: External ear normal.   Nose: Nose normal.   Mouth/Throat: Oropharynx is clear and moist and mucous membranes are normal.   Eyes: Conjunctivae, EOM and lids are normal. Pupils are equal, round, and reactive to light.   Neck: Trachea normal and phonation normal. Neck supple.   Cardiovascular: Normal rate.   Pulmonary/Chest: Effort normal. No respiratory distress.   Musculoskeletal: Normal range of motion.         General: Normal range of motion.   Neurological: He is alert and oriented to person, place, and time. He displays no weakness.   Skin: Skin is warm, dry, intact, no rash and " abscessed (no area of defined fluctuance noted). Capillary refill takes less than 2 seconds. erythema No abrasion, No burn, No bruising and No ecchymosis        Psychiatric: His speech is normal and behavior is normal. Mood, judgment and thought content normal.   Nursing note and vitals reviewed.      Assessment:     1. Abscess    2. Left upper arm pain        Plan:       Abscess  -     cephALEXin (KEFLEX) 500 MG capsule; Take 1 capsule (500 mg total) by mouth every 6 (six) hours. for 7 days  Dispense: 28 capsule; Refill: 0  -     Discontinue: traMADoL (ULTRAM) 50 mg tablet; Take 1 tablet (50 mg total) by mouth every 6 (six) hours. for 2 days  Dispense: 8 tablet; Refill: 0  -     ketorolac (TORADOL) 10 mg tablet; Take 1 tablet (10 mg total) by mouth every 6 (six) hours. for 5 days  Dispense: 20 tablet; Refill: 0    Left upper arm pain  -     Discontinue: traMADoL (ULTRAM) 50 mg tablet; Take 1 tablet (50 mg total) by mouth every 6 (six) hours. for 2 days  Dispense: 8 tablet; Refill: 0  -     ketorolac (TORADOL) 10 mg tablet; Take 1 tablet (10 mg total) by mouth every 6 (six) hours. for 5 days  Dispense: 20 tablet; Refill: 0      PDMP checked.     Discussed medication with patient who acknowledges understanding and is agreeable to POC. Follow up with primary care. Increase fluid intake. Red flags for ER discussed.

## 2024-07-28 ENCOUNTER — HOSPITAL ENCOUNTER (EMERGENCY)
Facility: HOSPITAL | Age: 49
Discharge: HOME | End: 2024-07-28
Attending: EMERGENCY MEDICINE
Payer: COMMERCIAL

## 2024-07-28 ENCOUNTER — HOSPITAL ENCOUNTER (EMERGENCY)
Facility: HOSPITAL | Age: 49
Discharge: HOME | End: 2024-07-28
Attending: STUDENT IN AN ORGANIZED HEALTH CARE EDUCATION/TRAINING PROGRAM
Payer: COMMERCIAL

## 2024-07-28 VITALS
DIASTOLIC BLOOD PRESSURE: 70 MMHG | OXYGEN SATURATION: 96 % | TEMPERATURE: 98 F | HEIGHT: 65 IN | SYSTOLIC BLOOD PRESSURE: 114 MMHG | WEIGHT: 274 LBS | HEART RATE: 90 BPM | BODY MASS INDEX: 45.65 KG/M2 | RESPIRATION RATE: 19 BRPM

## 2024-07-28 VITALS
TEMPERATURE: 98 F | DIASTOLIC BLOOD PRESSURE: 77 MMHG | WEIGHT: 274 LBS | OXYGEN SATURATION: 100 % | RESPIRATION RATE: 17 BRPM | SYSTOLIC BLOOD PRESSURE: 141 MMHG | HEART RATE: 98 BPM | BODY MASS INDEX: 45.6 KG/M2

## 2024-07-28 DIAGNOSIS — L02.91 ABSCESS: ICD-10-CM

## 2024-07-28 DIAGNOSIS — R07.9 CHEST PAIN: ICD-10-CM

## 2024-07-28 DIAGNOSIS — L03.114 CELLULITIS OF LEFT UPPER EXTREMITY: Primary | ICD-10-CM

## 2024-07-28 DIAGNOSIS — R50.9 FEVER, UNSPECIFIED FEVER CAUSE: ICD-10-CM

## 2024-07-28 DIAGNOSIS — R11.2 NAUSEA AND VOMITING, UNSPECIFIED VOMITING TYPE: ICD-10-CM

## 2024-07-28 LAB
ALBUMIN SERPL BCP-MCNC: 3.8 G/DL (ref 3.5–5.2)
ALBUMIN SERPL BCP-MCNC: 4 G/DL (ref 3.5–5.2)
ALP SERPL-CCNC: 66 U/L (ref 55–135)
ALP SERPL-CCNC: 69 U/L (ref 55–135)
ALT SERPL W/O P-5'-P-CCNC: 7 U/L (ref 10–44)
ALT SERPL W/O P-5'-P-CCNC: 7 U/L (ref 10–44)
ANION GAP SERPL CALC-SCNC: 10 MMOL/L (ref 8–16)
ANION GAP SERPL CALC-SCNC: 7 MMOL/L (ref 8–16)
AST SERPL-CCNC: 6 U/L (ref 10–40)
AST SERPL-CCNC: 8 U/L (ref 10–40)
BACTERIA #/AREA URNS HPF: NORMAL /HPF
BASOPHILS # BLD AUTO: 0.05 K/UL (ref 0–0.2)
BASOPHILS # BLD AUTO: 0.06 K/UL (ref 0–0.2)
BASOPHILS NFR BLD: 0.4 % (ref 0–1.9)
BASOPHILS NFR BLD: 0.5 % (ref 0–1.9)
BILIRUB SERPL-MCNC: 0.3 MG/DL (ref 0.1–1)
BILIRUB SERPL-MCNC: 0.5 MG/DL (ref 0.1–1)
BILIRUB UR QL STRIP: NEGATIVE
BUN SERPL-MCNC: 14 MG/DL (ref 6–20)
BUN SERPL-MCNC: 16 MG/DL (ref 6–20)
CALCIUM SERPL-MCNC: 9.2 MG/DL (ref 8.7–10.5)
CALCIUM SERPL-MCNC: 9.2 MG/DL (ref 8.7–10.5)
CHLORIDE SERPL-SCNC: 104 MMOL/L (ref 95–110)
CHLORIDE SERPL-SCNC: 104 MMOL/L (ref 95–110)
CLARITY UR: CLEAR
CO2 SERPL-SCNC: 22 MMOL/L (ref 23–29)
CO2 SERPL-SCNC: 27 MMOL/L (ref 23–29)
COLOR UR: YELLOW
CREAT SERPL-MCNC: 1.2 MG/DL (ref 0.5–1.4)
CREAT SERPL-MCNC: 1.2 MG/DL (ref 0.5–1.4)
DIFFERENTIAL METHOD BLD: ABNORMAL
DIFFERENTIAL METHOD BLD: ABNORMAL
EOSINOPHIL # BLD AUTO: 0.2 K/UL (ref 0–0.5)
EOSINOPHIL # BLD AUTO: 0.2 K/UL (ref 0–0.5)
EOSINOPHIL NFR BLD: 1.6 % (ref 0–8)
EOSINOPHIL NFR BLD: 2 % (ref 0–8)
ERYTHROCYTE [DISTWIDTH] IN BLOOD BY AUTOMATED COUNT: 15.5 % (ref 11.5–14.5)
ERYTHROCYTE [DISTWIDTH] IN BLOOD BY AUTOMATED COUNT: 15.6 % (ref 11.5–14.5)
EST. GFR  (NO RACE VARIABLE): >60 ML/MIN/1.73 M^2
EST. GFR  (NO RACE VARIABLE): >60 ML/MIN/1.73 M^2
GLUCOSE SERPL-MCNC: 101 MG/DL (ref 70–110)
GLUCOSE SERPL-MCNC: 78 MG/DL (ref 70–110)
GLUCOSE UR QL STRIP: NEGATIVE
HCT VFR BLD AUTO: 40.7 % (ref 40–54)
HCT VFR BLD AUTO: 41 % (ref 40–54)
HGB BLD-MCNC: 12.9 G/DL (ref 14–18)
HGB BLD-MCNC: 13.1 G/DL (ref 14–18)
HGB UR QL STRIP: NEGATIVE
HYALINE CASTS #/AREA URNS LPF: 0 /LPF
IMM GRANULOCYTES # BLD AUTO: 0.1 K/UL (ref 0–0.04)
IMM GRANULOCYTES # BLD AUTO: 0.14 K/UL (ref 0–0.04)
IMM GRANULOCYTES NFR BLD AUTO: 0.8 % (ref 0–0.5)
IMM GRANULOCYTES NFR BLD AUTO: 1.1 % (ref 0–0.5)
KETONES UR QL STRIP: NEGATIVE
LDH SERPL L TO P-CCNC: 0.97 MMOL/L (ref 0.5–2.2)
LDH SERPL L TO P-CCNC: 1.86 MMOL/L (ref 0.5–2.2)
LEUKOCYTE ESTERASE UR QL STRIP: NEGATIVE
LYMPHOCYTES # BLD AUTO: 1.3 K/UL (ref 1–4.8)
LYMPHOCYTES # BLD AUTO: 1.9 K/UL (ref 1–4.8)
LYMPHOCYTES NFR BLD: 10.2 % (ref 18–48)
LYMPHOCYTES NFR BLD: 14.6 % (ref 18–48)
MAGNESIUM SERPL-MCNC: 1.4 MG/DL (ref 1.6–2.6)
MCH RBC QN AUTO: 30.9 PG (ref 27–31)
MCH RBC QN AUTO: 31.2 PG (ref 27–31)
MCHC RBC AUTO-ENTMCNC: 31.5 G/DL (ref 32–36)
MCHC RBC AUTO-ENTMCNC: 32.2 G/DL (ref 32–36)
MCV RBC AUTO: 96 FL (ref 82–98)
MCV RBC AUTO: 99 FL (ref 82–98)
MICROSCOPIC COMMENT: NORMAL
MONOCYTES # BLD AUTO: 1.2 K/UL (ref 0.3–1)
MONOCYTES # BLD AUTO: 1.6 K/UL (ref 0.3–1)
MONOCYTES NFR BLD: 12.2 % (ref 4–15)
MONOCYTES NFR BLD: 9.8 % (ref 4–15)
NEUTROPHILS # BLD AUTO: 8.9 K/UL (ref 1.8–7.7)
NEUTROPHILS # BLD AUTO: 9.4 K/UL (ref 1.8–7.7)
NEUTROPHILS NFR BLD: 70 % (ref 38–73)
NEUTROPHILS NFR BLD: 76.8 % (ref 38–73)
NITRITE UR QL STRIP: NEGATIVE
NRBC BLD-RTO: 0 /100 WBC
NRBC BLD-RTO: 0 /100 WBC
PH UR STRIP: 6 [PH] (ref 5–8)
PHOSPHATE SERPL-MCNC: 3.7 MG/DL (ref 2.7–4.5)
PLATELET # BLD AUTO: 239 K/UL (ref 150–450)
PLATELET # BLD AUTO: 288 K/UL (ref 150–450)
PMV BLD AUTO: 10.5 FL (ref 9.2–12.9)
PMV BLD AUTO: 10.7 FL (ref 9.2–12.9)
POTASSIUM SERPL-SCNC: 4.1 MMOL/L (ref 3.5–5.1)
POTASSIUM SERPL-SCNC: 4.1 MMOL/L (ref 3.5–5.1)
PROT SERPL-MCNC: 7.1 G/DL (ref 6–8.4)
PROT SERPL-MCNC: 7.3 G/DL (ref 6–8.4)
PROT UR QL STRIP: ABNORMAL
RBC # BLD AUTO: 4.14 M/UL (ref 4.6–6.2)
RBC # BLD AUTO: 4.24 M/UL (ref 4.6–6.2)
RBC #/AREA URNS HPF: 4 /HPF (ref 0–4)
SAMPLE: NORMAL
SAMPLE: NORMAL
SARS-COV-2 RDRP RESP QL NAA+PROBE: NEGATIVE
SODIUM SERPL-SCNC: 136 MMOL/L (ref 136–145)
SODIUM SERPL-SCNC: 138 MMOL/L (ref 136–145)
SP GR UR STRIP: >1.03 (ref 1–1.03)
SQUAMOUS #/AREA URNS HPF: 0 /HPF
UNIDENT CRYS URNS QL MICRO: 1
URN SPEC COLLECT METH UR: ABNORMAL
UROBILINOGEN UR STRIP-ACNC: ABNORMAL EU/DL
WBC # BLD AUTO: 12.3 K/UL (ref 3.9–12.7)
WBC # BLD AUTO: 12.77 K/UL (ref 3.9–12.7)
WBC #/AREA URNS HPF: 3 /HPF (ref 0–5)

## 2024-07-28 PROCEDURE — 36415 COLL VENOUS BLD VENIPUNCTURE: CPT | Performed by: EMERGENCY MEDICINE

## 2024-07-28 PROCEDURE — 99284 EMERGENCY DEPT VISIT MOD MDM: CPT | Mod: 25

## 2024-07-28 PROCEDURE — 93005 ELECTROCARDIOGRAM TRACING: CPT | Performed by: INTERNAL MEDICINE

## 2024-07-28 PROCEDURE — 25000003 PHARM REV CODE 250: Performed by: PHYSICIAN ASSISTANT

## 2024-07-28 PROCEDURE — 99285 EMERGENCY DEPT VISIT HI MDM: CPT | Mod: 25

## 2024-07-28 PROCEDURE — 83735 ASSAY OF MAGNESIUM: CPT | Performed by: EMERGENCY MEDICINE

## 2024-07-28 PROCEDURE — 80053 COMPREHEN METABOLIC PANEL: CPT | Performed by: EMERGENCY MEDICINE

## 2024-07-28 PROCEDURE — 96361 HYDRATE IV INFUSION ADD-ON: CPT

## 2024-07-28 PROCEDURE — 96374 THER/PROPH/DIAG INJ IV PUSH: CPT

## 2024-07-28 PROCEDURE — 63600175 PHARM REV CODE 636 W HCPCS: Performed by: PHYSICIAN ASSISTANT

## 2024-07-28 PROCEDURE — 84100 ASSAY OF PHOSPHORUS: CPT | Performed by: EMERGENCY MEDICINE

## 2024-07-28 PROCEDURE — 25000003 PHARM REV CODE 250: Performed by: STUDENT IN AN ORGANIZED HEALTH CARE EDUCATION/TRAINING PROGRAM

## 2024-07-28 PROCEDURE — 93010 ELECTROCARDIOGRAM REPORT: CPT | Mod: ,,, | Performed by: INTERNAL MEDICINE

## 2024-07-28 PROCEDURE — 81001 URINALYSIS AUTO W/SCOPE: CPT | Performed by: EMERGENCY MEDICINE

## 2024-07-28 PROCEDURE — 96375 TX/PRO/DX INJ NEW DRUG ADDON: CPT

## 2024-07-28 PROCEDURE — 85025 COMPLETE CBC W/AUTO DIFF WBC: CPT | Mod: 91 | Performed by: PHYSICIAN ASSISTANT

## 2024-07-28 PROCEDURE — 87040 BLOOD CULTURE FOR BACTERIA: CPT | Mod: 59 | Performed by: EMERGENCY MEDICINE

## 2024-07-28 PROCEDURE — 96376 TX/PRO/DX INJ SAME DRUG ADON: CPT

## 2024-07-28 PROCEDURE — 85025 COMPLETE CBC W/AUTO DIFF WBC: CPT | Performed by: EMERGENCY MEDICINE

## 2024-07-28 PROCEDURE — 80053 COMPREHEN METABOLIC PANEL: CPT | Mod: 91 | Performed by: PHYSICIAN ASSISTANT

## 2024-07-28 PROCEDURE — 93005 ELECTROCARDIOGRAM TRACING: CPT | Performed by: GENERAL PRACTICE

## 2024-07-28 PROCEDURE — 96365 THER/PROPH/DIAG IV INF INIT: CPT

## 2024-07-28 PROCEDURE — 63600175 PHARM REV CODE 636 W HCPCS: Performed by: EMERGENCY MEDICINE

## 2024-07-28 PROCEDURE — U0002 COVID-19 LAB TEST NON-CDC: HCPCS | Performed by: PHYSICIAN ASSISTANT

## 2024-07-28 PROCEDURE — 10060 I&D ABSCESS SIMPLE/SINGLE: CPT

## 2024-07-28 PROCEDURE — 25000003 PHARM REV CODE 250: Performed by: EMERGENCY MEDICINE

## 2024-07-28 PROCEDURE — 93010 ELECTROCARDIOGRAM REPORT: CPT | Mod: ,,, | Performed by: GENERAL PRACTICE

## 2024-07-28 RX ORDER — SODIUM CHLORIDE 9 MG/ML
1000 INJECTION, SOLUTION INTRAVENOUS
Status: COMPLETED | OUTPATIENT
Start: 2024-07-28 | End: 2024-07-28

## 2024-07-28 RX ORDER — HYDROCODONE BITARTRATE AND ACETAMINOPHEN 5; 325 MG/1; MG/1
1 TABLET ORAL EVERY 6 HOURS PRN
Qty: 12 TABLET | Refills: 0 | Status: SHIPPED | OUTPATIENT
Start: 2024-07-28 | End: 2024-07-31

## 2024-07-28 RX ORDER — LANOLIN ALCOHOL/MO/W.PET/CERES
400 CREAM (GRAM) TOPICAL ONCE
Status: COMPLETED | OUTPATIENT
Start: 2024-07-28 | End: 2024-07-28

## 2024-07-28 RX ORDER — ONDANSETRON HYDROCHLORIDE 2 MG/ML
4 INJECTION, SOLUTION INTRAVENOUS
Status: COMPLETED | OUTPATIENT
Start: 2024-07-28 | End: 2024-07-28

## 2024-07-28 RX ORDER — ACETAMINOPHEN 325 MG/1
650 TABLET ORAL
Status: COMPLETED | OUTPATIENT
Start: 2024-07-28 | End: 2024-07-28

## 2024-07-28 RX ORDER — ONDANSETRON HYDROCHLORIDE 2 MG/ML
4 INJECTION, SOLUTION INTRAVENOUS ONCE
Status: COMPLETED | OUTPATIENT
Start: 2024-07-28 | End: 2024-07-28

## 2024-07-28 RX ORDER — HYDROCODONE BITARTRATE AND ACETAMINOPHEN 5; 325 MG/1; MG/1
1 TABLET ORAL
Status: COMPLETED | OUTPATIENT
Start: 2024-07-28 | End: 2024-07-28

## 2024-07-28 RX ORDER — LIDOCAINE HYDROCHLORIDE AND EPINEPHRINE 10; 10 MG/ML; UG/ML
10 INJECTION, SOLUTION INFILTRATION; PERINEURAL ONCE
Status: COMPLETED | OUTPATIENT
Start: 2024-07-28 | End: 2024-07-28

## 2024-07-28 RX ORDER — DOXYCYCLINE 100 MG/1
100 CAPSULE ORAL 2 TIMES DAILY
Qty: 20 CAPSULE | Refills: 0 | Status: SHIPPED | OUTPATIENT
Start: 2024-07-28 | End: 2024-08-07

## 2024-07-28 RX ADMIN — SODIUM CHLORIDE 1000 ML: 9 INJECTION, SOLUTION INTRAVENOUS at 07:07

## 2024-07-28 RX ADMIN — HYDROCODONE BITARTRATE AND ACETAMINOPHEN 1 TABLET: 5; 325 TABLET ORAL at 09:07

## 2024-07-28 RX ADMIN — ACETAMINOPHEN 650 MG: 325 TABLET ORAL at 09:07

## 2024-07-28 RX ADMIN — LIDOCAINE HYDROCHLORIDE AND EPINEPHRINE 10 ML: 10; 10 INJECTION, SOLUTION INFILTRATION; PERINEURAL at 11:07

## 2024-07-28 RX ADMIN — Medication 400 MG: at 10:07

## 2024-07-28 RX ADMIN — ONDANSETRON 4 MG: 2 INJECTION INTRAMUSCULAR; INTRAVENOUS at 09:07

## 2024-07-28 RX ADMIN — DALBAVANCIN 1500 MG: 500 INJECTION, POWDER, FOR SOLUTION INTRAVENOUS at 10:07

## 2024-07-28 RX ADMIN — ONDANSETRON 4 MG: 2 INJECTION INTRAMUSCULAR; INTRAVENOUS at 07:07

## 2024-07-29 LAB
OHS QRS DURATION: 76 MS
OHS QTC CALCULATION: 418 MS

## 2024-08-01 ENCOUNTER — OFFICE VISIT (OUTPATIENT)
Dept: FAMILY MEDICINE | Facility: CLINIC | Age: 49
End: 2024-08-01
Payer: COMMERCIAL

## 2024-08-01 ENCOUNTER — OFFICE VISIT (OUTPATIENT)
Dept: SURGERY | Facility: CLINIC | Age: 49
End: 2024-08-01
Payer: COMMERCIAL

## 2024-08-01 VITALS
RESPIRATION RATE: 16 BRPM | TEMPERATURE: 98 F | DIASTOLIC BLOOD PRESSURE: 84 MMHG | HEIGHT: 65 IN | OXYGEN SATURATION: 96 % | HEART RATE: 93 BPM | BODY MASS INDEX: 44.81 KG/M2 | WEIGHT: 268.94 LBS | SYSTOLIC BLOOD PRESSURE: 106 MMHG

## 2024-08-01 VITALS — TEMPERATURE: 98 F | DIASTOLIC BLOOD PRESSURE: 84 MMHG | HEART RATE: 93 BPM | SYSTOLIC BLOOD PRESSURE: 106 MMHG

## 2024-08-01 DIAGNOSIS — L02.91 ABSCESS: Primary | ICD-10-CM

## 2024-08-01 DIAGNOSIS — L02.91 ABSCESS: ICD-10-CM

## 2024-08-01 DIAGNOSIS — L03.114 CELLULITIS OF LEFT UPPER ARM: Primary | ICD-10-CM

## 2024-08-01 PROCEDURE — 99214 OFFICE O/P EST MOD 30 MIN: CPT | Mod: S$GLB,,,

## 2024-08-01 PROCEDURE — 99999 PR PBB SHADOW E&M-EST. PATIENT-LVL II: CPT | Mod: PBBFAC,,, | Performed by: STUDENT IN AN ORGANIZED HEALTH CARE EDUCATION/TRAINING PROGRAM

## 2024-08-01 PROCEDURE — 3072F LOW RISK FOR RETINOPATHY: CPT | Mod: CPTII,S$GLB,,

## 2024-08-01 PROCEDURE — 1159F MED LIST DOCD IN RCRD: CPT | Mod: CPTII,S$GLB,,

## 2024-08-01 PROCEDURE — 3008F BODY MASS INDEX DOCD: CPT | Mod: CPTII,S$GLB,,

## 2024-08-01 PROCEDURE — 3044F HG A1C LEVEL LT 7.0%: CPT | Mod: CPTII,S$GLB,,

## 2024-08-01 PROCEDURE — 3079F DIAST BP 80-89 MM HG: CPT | Mod: CPTII,S$GLB,,

## 2024-08-01 PROCEDURE — 1160F RVW MEDS BY RX/DR IN RCRD: CPT | Mod: CPTII,S$GLB,,

## 2024-08-01 PROCEDURE — 1111F DSCHRG MED/CURRENT MED MERGE: CPT | Mod: CPTII,S$GLB,,

## 2024-08-01 PROCEDURE — 3074F SYST BP LT 130 MM HG: CPT | Mod: CPTII,S$GLB,,

## 2024-08-01 PROCEDURE — 99999 PR PBB SHADOW E&M-EST. PATIENT-LVL V: CPT | Mod: PBBFAC,,,

## 2024-08-01 RX ORDER — TRAMADOL HYDROCHLORIDE 50 MG/1
50 TABLET ORAL EVERY 6 HOURS PRN
COMMUNITY
Start: 2024-07-26

## 2024-08-01 RX ORDER — OXYCODONE AND ACETAMINOPHEN 5; 325 MG/1; MG/1
1 TABLET ORAL EVERY 6 HOURS PRN
Qty: 10 TABLET | Refills: 0 | Status: SHIPPED | OUTPATIENT
Start: 2024-08-01

## 2024-08-01 RX ORDER — SULFAMETHOXAZOLE AND TRIMETHOPRIM 800; 160 MG/1; MG/1
1 TABLET ORAL 2 TIMES DAILY
Qty: 20 TABLET | Refills: 0 | Status: SHIPPED | OUTPATIENT
Start: 2024-08-01 | End: 2024-08-11

## 2024-08-01 NOTE — PROGRESS NOTES
Procedure note     This is a 49-year-old male who presented with cellulitis of the left upper extremity.  He underwent I and D of a phlegmon in the ED a few days ago.  Symptoms have worsened.  On exam, he had a fluctuant mass.  I recommended I and D for which she did consent.  He was taken to the procedure room, placed supine, the left upper inner was prepped and draped usual fashion, and a time-out was completed.  Skin was anesthetized with 1% lidocaine with epinephrine.  His 2 prior stab incisions were over the fluctuant area.  These were opened sharply with a scalpel.  The wound was probed until an abscess cavity was entered bluntly with a hemostat.  Purulent fluid as well as necrotic fat was expressed from the wound.  The wound was irrigated and then loosely packed with a single gauze 4 x 4 which also served as a dressing.  Patient tolerated the entire procedure without apparent complication.  Local wound care instructions were provided.  Patient had Bactrim sent by his PCP this morning.  A prescription for pain medication was also sent.  Follow up with me as needed.

## 2024-08-01 NOTE — PROGRESS NOTES
Subjective:       Patient ID: Jacoby Jean-Baptiste is a 49 y.o. male.    Chief Complaint: Follow-up    Devin Jean-Baptiste is a 49 y.o. male patient that presents to clinic for ER follow up.  Patient noticed a red bump on his left upper arm on 7/23/24.  Bump started to get worse so he went to Urgent care on 7/26.  He was given Keflex for cellulitis.  Swelling and pain was worsening so he went to [facility] ER on 7/28/24.  An ultrasound was done which showed no drainable fluid.  There was concern he was developing an abscess so an I&D was done with no drainage however a drain was placed to help drain any developing pus over the next few days.  He was also prescribed doxycycline and discharged. Later the same day he returned back to ER due to 4 episodes of vomiting with persistent nausea.  He also had a reported fever of 102.  He took naprosyn and phenergan with no relief.  He was given zofran in the ER which helped.  He was afebrile in ER.  He was given a dose of Dalvance and he had no further episodes of nausea.  He was discharged with a few days of Norco to help with pain and advised to follow up with his pcp.  Today he states the redness is improving but continues to have pain that has worsened.  His drain fell out on its own.  He has had no drainage.                       Review of patient's allergies indicates:  No Known Allergies  Social Determinants of Health     Tobacco Use: Medium Risk (8/1/2024)    Patient History     Smoking Tobacco Use: Former     Smokeless Tobacco Use: Never     Passive Exposure: Not on file   Alcohol Use: Not At Risk (6/14/2024)    Received from [facility]    AUDIT-C     Frequency of Alcohol Consumption: Never     Average Number of Drinks: Patient does not drink     Frequency of Binge Drinking: Never   Financial Resource Strain: Low Risk  (6/14/2024)    Received from [facility] and [facility]    Overall Financial Resource Strain (CARDI)      Difficulty of Paying Living Expenses: Not hard at all   Food Insecurity: No Food Insecurity (6/14/2024)    Received from [facility]    Hunger Vital Sign     Worried About Running Out of Food in the Last Year: Never true     Ran Out of Food in the Last Year: Never true   Transportation Needs: No Transportation Needs (6/14/2024)    Received from [facility]    PRAPARE - Transportation     Lack of Transportation (Medical): No     Lack of Transportation (Non-Medical): No   Physical Activity: Inactive (6/14/2024)    Received from [facility]    Exercise Vital Sign     Days of Exercise per Week: 0 days     Minutes of Exercise per Session: 0 min   Stress: No Stress Concern Present (6/14/2024)    Received from [facility]    [employer] - Occupational Stress Questionnaire     Feeling of Stress : Not at all   Housing Stability: Low Risk  (6/5/2024)    Housing Stability Vital Sign     Unable to Pay for Housing in the Last Year: No     Homeless in the Last Year: No   Depression: Low Risk  (8/1/2024)    Depression     Last PHQ-4: Flowsheet Data: 0   Utilities: Not At Risk (6/14/2024)    Received from [facility] Utilities     Threatened with loss of utilities: No   Health Literacy: Adequate Health Literacy (6/14/2024)    Received from [facility]     Health Literacy     Frequency of need for help with medical instructions: Never   Social Isolation: Not on file      Past Medical History:   Diagnosis Date    C. difficile colitis     Cavitary pneumonia 11/2023    pos aspergillus serology    Diabetes mellitus 01/2022    Hyperlipidemia 2015    Hypertension 2015    Insomnia 2015    Ulcerative colitis       Past Surgical History:   Procedure Laterality Date    BRONCHOSCOPY WITH  FLUOROSCOPY Left 11/20/2023    Procedure: BRONCHOSCOPY, WITH FLUOROSCOPY;  Surgeon: Lisa Villarreal MD;  Location: [facility] ENDO;  Service: Pulmonary;  Laterality: Left;    BRONCHOSCOPY WITH FLUOROSCOPY N/A 11/30/2023    Procedure: BRONCHOSCOPY, WITH FLUOROSCOPY;  Surgeon: Lisa Villarreal MD;  Location: [facility] ENDO;  Service: Pulmonary;  Laterality: N/A;    CARDIAC ELECTROPHYSIOLOGY MAPPING AND ABLATION  2017    SVT    CHOLECYSTECTOMY  2011    COLONOSCOPY N/A 5/3/2022    Procedure: COLONOSCOPY;  Surgeon: Shan Jean III, MD;  Location: [facility];  Service: Endoscopy;  Laterality: N/A;    COLONOSCOPY N/A 3/16/2024    Procedure: COLONOSCOPY;  Surgeon: ALEKSANDER Ramos MD;  Location: [facility];  Service: Endoscopy;  Laterality: N/A;    COLONOSCOPY WITH FECAL MICROBIOTA TRANSFER N/A 3/21/2023    Procedure: COLONOSCOPY, WITH FECAL MICROBIOTA TRANSFER;  Surgeon: David Millan MD;  Location: [facility];  Service: Endoscopy;  Laterality: N/A;    ESOPHAGOGASTRODUODENOSCOPY N/A 4/24/2023    Procedure: EGD (ESOPHAGOGASTRODUODENOSCOPY);  Surgeon: Shan Jean III, MD;  Location: [facility];  Service: Endoscopy;  Laterality: N/A;    ESOPHAGOGASTRODUODENOSCOPY N/A 6/5/2024    Procedure: EGD (ESOPHAGOGASTRODUODENOSCOPY);  Surgeon: Odalis Mccabe MD;  Location: [facility];  Service: Endoscopy;  Laterality: N/A;    FLEXIBLE SIGMOIDOSCOPY N/A 6/5/2024    Procedure: SIGMOIDOSCOPY, FLEXIBLE;  Surgeon: Odalis Mccabe MD;  Location: [facility] ENDO;  Service: Endoscopy;  Laterality: N/A;    FLEXIBLE SIGMOIDOSCOPY N/A 7/16/2024    Procedure: SIGMOIDOSCOPY, FLEXIBLE;  Surgeon: Shan Jean III, MD;  Location: [facility];  Service: Endoscopy;  Laterality: N/A;    GANGLION CYST EXCISION Left 1992    UMBILICAL HERNIA REPAIR  2011    with mesh      Social History     Socioeconomic History    Marital status: Single         Current Outpatient Medications:     budesonide (ENTOCORT EC) 3 mg capsule, Take 3 mg by mouth  once daily., Disp: , Rfl:     busPIRone (BUSPAR) 10 MG tablet, Take 1 tablet (10 mg total) by mouth 3 (three) times daily., Disp: 270 tablet, Rfl: 1    cholestyramine (QUESTRAN) 4 gram packet, Take 1 packet by mouth once daily., Disp: , Rfl:     doxycycline (VIBRAMYCIN) 100 MG Cap, Take 1 capsule (100 mg total) by mouth 2 (two) times daily. for 10 days, Disp: 20 capsule, Rfl: 0    ergocalciferol (VITAMIN D2) 50,000 unit Cap, Take 1 capsule (50,000 Units total) by mouth every 7 days., Disp: 12 capsule, Rfl: 1    hyoscyamine (LEVBID) 0.375 mg Tb12, Take 375 mcg by mouth 2 (two) times daily., Disp: , Rfl:     lipase-protease-amylase 12,000-38,000-60,000 units (CREON) CpDR, Take 3 capsules by mouth 3 (three) times daily with meals., Disp: 270 capsule, Rfl: 11    LORazepam (ATIVAN) 0.5 MG tablet, Take 1 tablet (0.5 mg total) by mouth every 12 (twelve) hours as needed for Anxiety., Disp: 60 tablet, Rfl: 2    metFORMIN (GLUCOPHAGE-XR) 500 MG ER 24hr tablet, Take 1 tablet (500 mg total) by mouth 2 (two) times daily with meals., Disp: 180 tablet, Rfl: 1    metoprolol succinate (TOPROL-XL) 50 MG 24 hr tablet, Take 1 tablet (50 mg total) by mouth once daily., Disp: 90 tablet, Rfl: 1    rifAXIMin (XIFAXAN) 550 mg Tab, Take 1 tablet (550 mg total) by mouth 3 (three) times daily., Disp: 90 tablet, Rfl: 0    semaglutide (OZEMPIC) 2 mg/dose (8 mg/3 mL) PnIj, Inject 2 mg into the skin every 7 days. (Patient taking differently: Inject 2 mg into the skin every 7 days. SATURDAYS), Disp: 3 mL, Rfl: 5    sertraline (ZOLOFT) 100 MG tablet, Take 1 tablet (100 mg total) by mouth once daily., Disp: 90 tablet, Rfl: 1    simvastatin (ZOCOR) 20 MG tablet, Take 1 tablet (20 mg total) by mouth every evening., Disp: 90 tablet, Rfl: 1    traMADoL (ULTRAM) 50 mg tablet, Take 50 mg by mouth every 6 (six) hours as needed., Disp: , Rfl:     triamcinolone acetonide 0.1% (KENALOG) 0.1 % cream, Apply topically 2 (two) times daily. (Patient taking  differently: Apply 1 g topically 2 (two) times daily.), Disp: 80 g, Rfl: 0    zolpidem (AMBIEN) 10 mg Tab, Take 1 tablet (10 mg total) by mouth nightly as needed (insomnia)., Disp: 30 tablet, Rfl: 2    oxyCODONE-acetaminophen (PERCOCET) 5-325 mg per tablet, Take 1 tablet by mouth every 6 (six) hours as needed for Pain., Disp: 10 tablet, Rfl: 0    sulfamethoxazole-trimethoprim 800-160mg (BACTRIM DS) 800-160 mg Tab, Take 1 tablet by mouth 2 (two) times daily. for 10 days, Disp: 20 tablet, Rfl: 0  No current facility-administered medications for this visit.    Facility-Administered Medications Ordered in Other Visits:     lactated ringers infusion, , Intravenous, Continuous, David Millan MD, Last Rate: 75 mL/hr at 03/21/23 1252, New Bag at 03/21/23 1252    Lab Results   Component Value Date    WBC 12.77 (H) 07/28/2024    HGB 13.1 (L) 07/28/2024    HCT 40.7 07/28/2024     07/28/2024    CHOL 142 10/27/2023    TRIG 83 10/27/2023    HDL 44 10/27/2023    ALT 7 (L) 07/28/2024    AST 8 (L) 07/28/2024     07/28/2024    K 4.1 07/28/2024     07/28/2024    CREATININE 1.2 07/28/2024    BUN 14 07/28/2024    CO2 22 (L) 07/28/2024    TSH 1.132 07/15/2024    PSA 0.24 03/10/2021    HGBA1C 5.2 06/06/2024    MICROALBUR 1.2 01/26/2022       Review of Systems   Constitutional: Negative.    Respiratory: Negative.     Cardiovascular: Negative.    Integumentary:  Positive for wound.       Objective:      Physical Exam  Vitals reviewed.   Constitutional:       Appearance: Normal appearance. He is obese.   Cardiovascular:      Rate and Rhythm: Normal rate and regular rhythm.      Pulses: Normal pulses.      Heart sounds: Normal heart sounds.   Pulmonary:      Effort: Pulmonary effort is normal.      Breath sounds: Normal breath sounds.   Skin:     General: Skin is warm and dry.      Capillary Refill: Capillary refill takes less than 2 seconds.      Findings: Abscess and erythema present.          Neurological:       "Mental Status: He is alert.         Assessment:       1. Cellulitis of left upper arm    2. Abscess        Plan:       Jacoby Waters" was seen today for follow-up.    Diagnoses and all orders for this visit:    Cellulitis of left upper arm  -     sulfamethoxazole-trimethoprim 800-160mg (BACTRIM DS) 800-160 mg Tab; Take 1 tablet by mouth 2 (two) times daily. for 10 days  -     Ambulatory referral/consult to General Surgery; Future    Abscess  -     sulfamethoxazole-trimethoprim 800-160mg (BACTRIM DS) 800-160 mg Tab; Take 1 tablet by mouth 2 (two) times daily. for 10 days  -     Ambulatory referral/consult to General Surgery; Future    Referral to General surgery for appointment today.  Bactrim sent to pharmacy.  Follow up if symptoms worsen or do not improve.          "

## 2024-08-02 LAB
BACTERIA BLD CULT: NORMAL
BACTERIA BLD CULT: NORMAL

## 2024-08-03 LAB
OHS QRS DURATION: 88 MS
OHS QTC CALCULATION: 430 MS

## 2024-08-04 ENCOUNTER — HOSPITAL ENCOUNTER (EMERGENCY)
Facility: HOSPITAL | Age: 49
Discharge: HOME | End: 2024-08-04
Attending: STUDENT IN AN ORGANIZED HEALTH CARE EDUCATION/TRAINING PROGRAM
Payer: COMMERCIAL

## 2024-08-04 VITALS
WEIGHT: 266 LBS | SYSTOLIC BLOOD PRESSURE: 132 MMHG | DIASTOLIC BLOOD PRESSURE: 71 MMHG | HEIGHT: 65 IN | BODY MASS INDEX: 44.32 KG/M2 | TEMPERATURE: 99 F | RESPIRATION RATE: 16 BRPM | OXYGEN SATURATION: 100 % | HEART RATE: 81 BPM

## 2024-08-04 DIAGNOSIS — K52.9 PROCTOCOLITIS: Primary | ICD-10-CM

## 2024-08-04 LAB
ALBUMIN SERPL BCP-MCNC: 3.6 G/DL (ref 3.5–5.2)
ALP SERPL-CCNC: 78 U/L (ref 55–135)
ALT SERPL W/O P-5'-P-CCNC: 6 U/L (ref 10–44)
ANION GAP SERPL CALC-SCNC: 10 MMOL/L (ref 8–16)
AST SERPL-CCNC: 9 U/L (ref 10–40)
BASOPHILS # BLD AUTO: 0.13 K/UL (ref 0–0.2)
BASOPHILS NFR BLD: 0.9 % (ref 0–1.9)
BILIRUB SERPL-MCNC: 0.3 MG/DL (ref 0.1–1)
BILIRUB UR QL STRIP: NEGATIVE
BUN SERPL-MCNC: 13 MG/DL (ref 6–20)
CALCIUM SERPL-MCNC: 9.1 MG/DL (ref 8.7–10.5)
CHLORIDE SERPL-SCNC: 105 MMOL/L (ref 95–110)
CLARITY UR: CLEAR
CO2 SERPL-SCNC: 21 MMOL/L (ref 23–29)
COLOR UR: YELLOW
CREAT SERPL-MCNC: 1.3 MG/DL (ref 0.5–1.4)
CREAT SERPL-MCNC: 1.4 MG/DL (ref 0.5–1.4)
DIFFERENTIAL METHOD BLD: ABNORMAL
EOSINOPHIL # BLD AUTO: 0.3 K/UL (ref 0–0.5)
EOSINOPHIL NFR BLD: 2 % (ref 0–8)
ERYTHROCYTE [DISTWIDTH] IN BLOOD BY AUTOMATED COUNT: 15.1 % (ref 11.5–14.5)
EST. GFR  (NO RACE VARIABLE): >60 ML/MIN/1.73 M^2
GLUCOSE SERPL-MCNC: 96 MG/DL (ref 70–110)
GLUCOSE UR QL STRIP: NEGATIVE
HCT VFR BLD AUTO: 40.7 % (ref 40–54)
HGB BLD-MCNC: 13.1 G/DL (ref 14–18)
HGB UR QL STRIP: NEGATIVE
IMM GRANULOCYTES # BLD AUTO: 0.25 K/UL (ref 0–0.04)
IMM GRANULOCYTES NFR BLD AUTO: 1.7 % (ref 0–0.5)
INFLUENZA A, MOLECULAR: NEGATIVE
INFLUENZA B, MOLECULAR: NEGATIVE
KETONES UR QL STRIP: NEGATIVE
LEUKOCYTE ESTERASE UR QL STRIP: NEGATIVE
LIPASE SERPL-CCNC: <3 U/L (ref 4–60)
LYMPHOCYTES # BLD AUTO: 1.8 K/UL (ref 1–4.8)
LYMPHOCYTES NFR BLD: 12.9 % (ref 18–48)
MCH RBC QN AUTO: 30.1 PG (ref 27–31)
MCHC RBC AUTO-ENTMCNC: 32.2 G/DL (ref 32–36)
MCV RBC AUTO: 94 FL (ref 82–98)
MONOCYTES # BLD AUTO: 1.3 K/UL (ref 0.3–1)
MONOCYTES NFR BLD: 9 % (ref 4–15)
NEUTROPHILS # BLD AUTO: 10.5 K/UL (ref 1.8–7.7)
NEUTROPHILS NFR BLD: 73.5 % (ref 38–73)
NITRITE UR QL STRIP: NEGATIVE
NRBC BLD-RTO: 0 /100 WBC
PH UR STRIP: 6 [PH] (ref 5–8)
PLATELET # BLD AUTO: 349 K/UL (ref 150–450)
PMV BLD AUTO: 10.4 FL (ref 9.2–12.9)
POTASSIUM SERPL-SCNC: 4.3 MMOL/L (ref 3.5–5.1)
PROT SERPL-MCNC: 6.9 G/DL (ref 6–8.4)
PROT UR QL STRIP: ABNORMAL
RBC # BLD AUTO: 4.35 M/UL (ref 4.6–6.2)
SAMPLE: NORMAL
SARS-COV-2 RDRP RESP QL NAA+PROBE: NEGATIVE
SODIUM SERPL-SCNC: 136 MMOL/L (ref 136–145)
SP GR UR STRIP: >1.03 (ref 1–1.03)
SPECIMEN SOURCE: NORMAL
URN SPEC COLLECT METH UR: ABNORMAL
UROBILINOGEN UR STRIP-ACNC: NEGATIVE EU/DL
WBC # BLD AUTO: 14.3 K/UL (ref 3.9–12.7)

## 2024-08-04 PROCEDURE — 80053 COMPREHEN METABOLIC PANEL: CPT | Performed by: NURSE PRACTITIONER

## 2024-08-04 PROCEDURE — 96375 TX/PRO/DX INJ NEW DRUG ADDON: CPT

## 2024-08-04 PROCEDURE — 81003 URINALYSIS AUTO W/O SCOPE: CPT | Performed by: NURSE PRACTITIONER

## 2024-08-04 PROCEDURE — 25500020 PHARM REV CODE 255: Performed by: NURSE PRACTITIONER

## 2024-08-04 PROCEDURE — 85025 COMPLETE CBC W/AUTO DIFF WBC: CPT | Performed by: NURSE PRACTITIONER

## 2024-08-04 PROCEDURE — 87502 INFLUENZA DNA AMP PROBE: CPT | Performed by: NURSE PRACTITIONER

## 2024-08-04 PROCEDURE — 99285 EMERGENCY DEPT VISIT HI MDM: CPT | Mod: 25

## 2024-08-04 PROCEDURE — 96365 THER/PROPH/DIAG IV INF INIT: CPT

## 2024-08-04 PROCEDURE — 25000003 PHARM REV CODE 250: Performed by: NURSE PRACTITIONER

## 2024-08-04 PROCEDURE — U0002 COVID-19 LAB TEST NON-CDC: HCPCS | Performed by: NURSE PRACTITIONER

## 2024-08-04 PROCEDURE — 96361 HYDRATE IV INFUSION ADD-ON: CPT

## 2024-08-04 PROCEDURE — 82565 ASSAY OF CREATININE: CPT

## 2024-08-04 PROCEDURE — 63600175 PHARM REV CODE 636 W HCPCS: Performed by: NURSE PRACTITIONER

## 2024-08-04 PROCEDURE — 83690 ASSAY OF LIPASE: CPT | Performed by: NURSE PRACTITIONER

## 2024-08-04 RX ORDER — LEVOFLOXACIN 750 MG/1
750 TABLET ORAL DAILY
Status: DISCONTINUED | OUTPATIENT
Start: 2024-08-05 | End: 2024-08-05 | Stop reason: HOSPADM

## 2024-08-04 RX ORDER — METRONIDAZOLE 500 MG/100ML
500 INJECTION, SOLUTION INTRAVENOUS
Status: COMPLETED | OUTPATIENT
Start: 2024-08-04 | End: 2024-08-04

## 2024-08-04 RX ORDER — LEVOFLOXACIN 750 MG/1
750 TABLET ORAL DAILY
Qty: 7 TABLET | Refills: 0 | Status: ON HOLD | OUTPATIENT
Start: 2024-08-04 | End: 2024-08-11

## 2024-08-04 RX ORDER — ONDANSETRON HYDROCHLORIDE 2 MG/ML
4 INJECTION, SOLUTION INTRAVENOUS
Status: COMPLETED | OUTPATIENT
Start: 2024-08-04 | End: 2024-08-04

## 2024-08-04 RX ORDER — METRONIDAZOLE 500 MG/1
500 TABLET ORAL 3 TIMES DAILY
Qty: 21 TABLET | Refills: 0 | Status: ON HOLD | OUTPATIENT
Start: 2024-08-04 | End: 2024-08-11

## 2024-08-04 RX ADMIN — ONDANSETRON 4 MG: 2 INJECTION INTRAMUSCULAR; INTRAVENOUS at 05:08

## 2024-08-04 RX ADMIN — IOHEXOL 100 ML: 350 INJECTION, SOLUTION INTRAVENOUS at 05:08

## 2024-08-04 RX ADMIN — METRONIDAZOLE 500 MG: 5 INJECTION, SOLUTION INTRAVENOUS at 08:08

## 2024-08-04 RX ADMIN — SODIUM CHLORIDE 1000 ML: 9 INJECTION, SOLUTION INTRAVENOUS at 05:08

## 2024-08-09 ENCOUNTER — HOSPITAL ENCOUNTER (INPATIENT)
Facility: HOSPITAL | Age: 49
LOS: 5 days | Discharge: HOME | DRG: 872 | End: 2024-08-15
Attending: STUDENT IN AN ORGANIZED HEALTH CARE EDUCATION/TRAINING PROGRAM | Admitting: STUDENT IN AN ORGANIZED HEALTH CARE EDUCATION/TRAINING PROGRAM
Payer: COMMERCIAL

## 2024-08-09 DIAGNOSIS — R07.9 CHEST PAIN: ICD-10-CM

## 2024-08-09 DIAGNOSIS — L02.91 SKIN ABSCESS: Primary | ICD-10-CM

## 2024-08-09 DIAGNOSIS — A41.9 SEPSIS: ICD-10-CM

## 2024-08-09 DIAGNOSIS — L02.818 CUTANEOUS ABSCESS OF OTHER SITE: ICD-10-CM

## 2024-08-09 DIAGNOSIS — R11.10 VOMITING: ICD-10-CM

## 2024-08-09 DIAGNOSIS — K52.9 PROCTOCOLITIS: ICD-10-CM

## 2024-08-09 DIAGNOSIS — A41.9 SEPSIS, DUE TO UNSPECIFIED ORGANISM, UNSPECIFIED WHETHER ACUTE ORGAN DYSFUNCTION PRESENT: ICD-10-CM

## 2024-08-09 PROBLEM — N17.9 ACUTE RENAL FAILURE: Status: ACTIVE | Noted: 2024-08-09

## 2024-08-09 LAB
ALBUMIN SERPL BCP-MCNC: 3.6 G/DL (ref 3.5–5.2)
ALP SERPL-CCNC: 77 U/L (ref 55–135)
ALT SERPL W/O P-5'-P-CCNC: 6 U/L (ref 10–44)
ANION GAP SERPL CALC-SCNC: 11 MMOL/L (ref 8–16)
AST SERPL-CCNC: 11 U/L (ref 10–40)
BACTERIA #/AREA URNS HPF: ABNORMAL /HPF
BASOPHILS # BLD AUTO: 0.1 K/UL (ref 0–0.2)
BASOPHILS NFR BLD: 0.6 % (ref 0–1.9)
BILIRUB SERPL-MCNC: 0.3 MG/DL (ref 0.1–1)
BILIRUB UR QL STRIP: NEGATIVE
BUN SERPL-MCNC: 10 MG/DL (ref 6–20)
C DIFF GDH STL QL: NEGATIVE
C DIFF TOX A+B STL QL IA: NEGATIVE
CALCIUM SERPL-MCNC: 8.8 MG/DL (ref 8.7–10.5)
CHLORIDE SERPL-SCNC: 103 MMOL/L (ref 95–110)
CLARITY UR: CLEAR
CO2 SERPL-SCNC: 20 MMOL/L (ref 23–29)
COLOR UR: YELLOW
CREAT SERPL-MCNC: 2 MG/DL (ref 0.5–1.4)
DIFFERENTIAL METHOD BLD: ABNORMAL
EOSINOPHIL # BLD AUTO: 0.3 K/UL (ref 0–0.5)
EOSINOPHIL NFR BLD: 1.6 % (ref 0–8)
ERYTHROCYTE [DISTWIDTH] IN BLOOD BY AUTOMATED COUNT: 14.6 % (ref 11.5–14.5)
EST. GFR  (NO RACE VARIABLE): 39.9 ML/MIN/1.73 M^2
GLUCOSE SERPL-MCNC: 82 MG/DL (ref 70–110)
GLUCOSE UR QL STRIP: NEGATIVE
HCT VFR BLD AUTO: 43.2 % (ref 40–54)
HGB BLD-MCNC: 14.3 G/DL (ref 14–18)
HGB UR QL STRIP: NEGATIVE
HYALINE CASTS #/AREA URNS LPF: 176 /LPF
IMM GRANULOCYTES # BLD AUTO: 0.18 K/UL (ref 0–0.04)
IMM GRANULOCYTES NFR BLD AUTO: 1 % (ref 0–0.5)
KETONES UR QL STRIP: NEGATIVE
LDH SERPL L TO P-CCNC: 1.69 MMOL/L (ref 0.5–2.2)
LEUKOCYTE ESTERASE UR QL STRIP: ABNORMAL
LIPASE SERPL-CCNC: 4 U/L (ref 4–60)
LYMPHOCYTES # BLD AUTO: 1.9 K/UL (ref 1–4.8)
LYMPHOCYTES NFR BLD: 11.2 % (ref 18–48)
MCH RBC QN AUTO: 31 PG (ref 27–31)
MCHC RBC AUTO-ENTMCNC: 33.1 G/DL (ref 32–36)
MCV RBC AUTO: 94 FL (ref 82–98)
MICROSCOPIC COMMENT: ABNORMAL
MONOCYTES # BLD AUTO: 1.3 K/UL (ref 0.3–1)
MONOCYTES NFR BLD: 7.6 % (ref 4–15)
NEUTROPHILS # BLD AUTO: 13.5 K/UL (ref 1.8–7.7)
NEUTROPHILS NFR BLD: 78 % (ref 38–73)
NITRITE UR QL STRIP: NEGATIVE
NRBC BLD-RTO: 0 /100 WBC
OHS QRS DURATION: 78 MS
OHS QTC CALCULATION: 433 MS
PH UR STRIP: 6 [PH] (ref 5–8)
PLATELET # BLD AUTO: 296 K/UL (ref 150–450)
PMV BLD AUTO: 10.1 FL (ref 9.2–12.9)
POTASSIUM SERPL-SCNC: 4 MMOL/L (ref 3.5–5.1)
PROT SERPL-MCNC: 7.3 G/DL (ref 6–8.4)
PROT UR QL STRIP: ABNORMAL
RBC # BLD AUTO: 4.61 M/UL (ref 4.6–6.2)
RBC #/AREA URNS HPF: 1 /HPF (ref 0–4)
SAMPLE: NORMAL
SODIUM SERPL-SCNC: 134 MMOL/L (ref 136–145)
SP GR UR STRIP: 1.02 (ref 1–1.03)
SQUAMOUS #/AREA URNS HPF: 1 /HPF
TROPONIN I SERPL HS-MCNC: 6 PG/ML (ref 0–14.9)
URN SPEC COLLECT METH UR: ABNORMAL
UROBILINOGEN UR STRIP-ACNC: NEGATIVE EU/DL
WBC # BLD AUTO: 17.28 K/UL (ref 3.9–12.7)
WBC #/AREA URNS HPF: 4 /HPF (ref 0–5)

## 2024-08-09 PROCEDURE — 25000003 PHARM REV CODE 250: Performed by: NURSE PRACTITIONER

## 2024-08-09 PROCEDURE — G0378 HOSPITAL OBSERVATION PER HR: HCPCS

## 2024-08-09 PROCEDURE — 93010 ELECTROCARDIOGRAM REPORT: CPT | Mod: ,,, | Performed by: INTERNAL MEDICINE

## 2024-08-09 PROCEDURE — 87324 CLOSTRIDIUM AG IA: CPT | Performed by: STUDENT IN AN ORGANIZED HEALTH CARE EDUCATION/TRAINING PROGRAM

## 2024-08-09 PROCEDURE — 96376 TX/PRO/DX INJ SAME DRUG ADON: CPT

## 2024-08-09 PROCEDURE — 81001 URINALYSIS AUTO W/SCOPE: CPT | Performed by: STUDENT IN AN ORGANIZED HEALTH CARE EDUCATION/TRAINING PROGRAM

## 2024-08-09 PROCEDURE — 63600175 PHARM REV CODE 636 W HCPCS: Performed by: HOSPITALIST

## 2024-08-09 PROCEDURE — 63600175 PHARM REV CODE 636 W HCPCS: Performed by: NURSE PRACTITIONER

## 2024-08-09 PROCEDURE — 36415 COLL VENOUS BLD VENIPUNCTURE: CPT | Performed by: NURSE PRACTITIONER

## 2024-08-09 PROCEDURE — 84484 ASSAY OF TROPONIN QUANT: CPT | Performed by: NURSE PRACTITIONER

## 2024-08-09 PROCEDURE — 83690 ASSAY OF LIPASE: CPT | Performed by: NURSE PRACTITIONER

## 2024-08-09 PROCEDURE — 96367 TX/PROPH/DG ADDL SEQ IV INF: CPT

## 2024-08-09 PROCEDURE — 99285 EMERGENCY DEPT VISIT HI MDM: CPT | Mod: 25

## 2024-08-09 PROCEDURE — 63600175 PHARM REV CODE 636 W HCPCS: Mod: JZ,JG | Performed by: NURSE PRACTITIONER

## 2024-08-09 PROCEDURE — 85025 COMPLETE CBC W/AUTO DIFF WBC: CPT | Performed by: NURSE PRACTITIONER

## 2024-08-09 PROCEDURE — 96365 THER/PROPH/DIAG IV INF INIT: CPT

## 2024-08-09 PROCEDURE — 93005 ELECTROCARDIOGRAM TRACING: CPT | Performed by: INTERNAL MEDICINE

## 2024-08-09 PROCEDURE — 25500020 PHARM REV CODE 255: Performed by: STUDENT IN AN ORGANIZED HEALTH CARE EDUCATION/TRAINING PROGRAM

## 2024-08-09 PROCEDURE — 36415 COLL VENOUS BLD VENIPUNCTURE: CPT | Performed by: STUDENT IN AN ORGANIZED HEALTH CARE EDUCATION/TRAINING PROGRAM

## 2024-08-09 PROCEDURE — 25000003 PHARM REV CODE 250: Performed by: HOSPITALIST

## 2024-08-09 PROCEDURE — 80053 COMPREHEN METABOLIC PANEL: CPT | Performed by: NURSE PRACTITIONER

## 2024-08-09 PROCEDURE — 87040 BLOOD CULTURE FOR BACTERIA: CPT | Mod: 59 | Performed by: STUDENT IN AN ORGANIZED HEALTH CARE EDUCATION/TRAINING PROGRAM

## 2024-08-09 PROCEDURE — 96361 HYDRATE IV INFUSION ADD-ON: CPT

## 2024-08-09 PROCEDURE — 87449 NOS EACH ORGANISM AG IA: CPT | Performed by: STUDENT IN AN ORGANIZED HEALTH CARE EDUCATION/TRAINING PROGRAM

## 2024-08-09 PROCEDURE — 25000003 PHARM REV CODE 250: Performed by: STUDENT IN AN ORGANIZED HEALTH CARE EDUCATION/TRAINING PROGRAM

## 2024-08-09 PROCEDURE — 96375 TX/PRO/DX INJ NEW DRUG ADDON: CPT

## 2024-08-09 PROCEDURE — 63600175 PHARM REV CODE 636 W HCPCS: Performed by: STUDENT IN AN ORGANIZED HEALTH CARE EDUCATION/TRAINING PROGRAM

## 2024-08-09 RX ORDER — METRONIDAZOLE 500 MG/100ML
500 INJECTION, SOLUTION INTRAVENOUS
Status: DISCONTINUED | OUTPATIENT
Start: 2024-08-09 | End: 2024-08-10

## 2024-08-09 RX ORDER — PROMETHAZINE HYDROCHLORIDE 12.5 MG/1
12.5 TABLET ORAL 4 TIMES DAILY PRN
COMMUNITY
Start: 2024-08-01

## 2024-08-09 RX ORDER — LANOLIN ALCOHOL/MO/W.PET/CERES
800 CREAM (GRAM) TOPICAL
Status: DISCONTINUED | OUTPATIENT
Start: 2024-08-09 | End: 2024-08-15 | Stop reason: HOSPADM

## 2024-08-09 RX ORDER — ACETAMINOPHEN 325 MG/1
650 TABLET ORAL EVERY 4 HOURS PRN
Status: DISCONTINUED | OUTPATIENT
Start: 2024-08-09 | End: 2024-08-15 | Stop reason: HOSPADM

## 2024-08-09 RX ORDER — ONDANSETRON HYDROCHLORIDE 2 MG/ML
4 INJECTION, SOLUTION INTRAVENOUS
Status: COMPLETED | OUTPATIENT
Start: 2024-08-09 | End: 2024-08-09

## 2024-08-09 RX ORDER — CHOLESTYRAMINE 4 G/4.8G
1 POWDER, FOR SUSPENSION ORAL 2 TIMES DAILY
Status: DISCONTINUED | OUTPATIENT
Start: 2024-08-09 | End: 2024-08-15 | Stop reason: HOSPADM

## 2024-08-09 RX ORDER — IBUPROFEN 200 MG
16 TABLET ORAL
Status: DISCONTINUED | OUTPATIENT
Start: 2024-08-09 | End: 2024-08-15 | Stop reason: HOSPADM

## 2024-08-09 RX ORDER — HYDROCODONE BITARTRATE AND ACETAMINOPHEN 5; 325 MG/1; MG/1
1 TABLET ORAL EVERY 6 HOURS PRN
Status: DISCONTINUED | OUTPATIENT
Start: 2024-08-09 | End: 2024-08-10

## 2024-08-09 RX ORDER — NALOXONE HCL 0.4 MG/ML
0.02 VIAL (ML) INJECTION
Status: DISCONTINUED | OUTPATIENT
Start: 2024-08-09 | End: 2024-08-15 | Stop reason: HOSPADM

## 2024-08-09 RX ORDER — BUSPIRONE HYDROCHLORIDE 5 MG/1
10 TABLET ORAL 3 TIMES DAILY
Status: DISCONTINUED | OUTPATIENT
Start: 2024-08-09 | End: 2024-08-15 | Stop reason: HOSPADM

## 2024-08-09 RX ORDER — SODIUM,POTASSIUM PHOSPHATES 280-250MG
2 POWDER IN PACKET (EA) ORAL
Status: DISCONTINUED | OUTPATIENT
Start: 2024-08-09 | End: 2024-08-15 | Stop reason: HOSPADM

## 2024-08-09 RX ORDER — GLUCAGON 1 MG
1 KIT INJECTION
Status: DISCONTINUED | OUTPATIENT
Start: 2024-08-09 | End: 2024-08-15 | Stop reason: HOSPADM

## 2024-08-09 RX ORDER — ACETAMINOPHEN 325 MG/1
650 TABLET ORAL EVERY 8 HOURS PRN
Status: DISCONTINUED | OUTPATIENT
Start: 2024-08-09 | End: 2024-08-15 | Stop reason: HOSPADM

## 2024-08-09 RX ORDER — IBUPROFEN 200 MG
24 TABLET ORAL
Status: DISCONTINUED | OUTPATIENT
Start: 2024-08-09 | End: 2024-08-15 | Stop reason: HOSPADM

## 2024-08-09 RX ORDER — SERTRALINE HYDROCHLORIDE 50 MG/1
100 TABLET, FILM COATED ORAL DAILY
Status: DISCONTINUED | OUTPATIENT
Start: 2024-08-10 | End: 2024-08-15 | Stop reason: HOSPADM

## 2024-08-09 RX ORDER — ONDANSETRON HYDROCHLORIDE 2 MG/ML
4 INJECTION, SOLUTION INTRAVENOUS EVERY 6 HOURS PRN
Status: DISCONTINUED | OUTPATIENT
Start: 2024-08-09 | End: 2024-08-15 | Stop reason: HOSPADM

## 2024-08-09 RX ORDER — METOPROLOL SUCCINATE 50 MG/1
50 TABLET, EXTENDED RELEASE ORAL DAILY
Status: DISCONTINUED | OUTPATIENT
Start: 2024-08-10 | End: 2024-08-15 | Stop reason: HOSPADM

## 2024-08-09 RX ORDER — SODIUM CHLORIDE 0.9 % (FLUSH) 0.9 %
2 SYRINGE (ML) INJECTION
Status: DISCONTINUED | OUTPATIENT
Start: 2024-08-09 | End: 2024-08-15 | Stop reason: HOSPADM

## 2024-08-09 RX ORDER — ATORVASTATIN CALCIUM 10 MG/1
10 TABLET, FILM COATED ORAL DAILY
Status: DISCONTINUED | OUTPATIENT
Start: 2024-08-10 | End: 2024-08-15 | Stop reason: HOSPADM

## 2024-08-09 RX ORDER — HYOSCYAMINE SULFATE 0.12 MG/1
0.12 TABLET SUBLINGUAL EVERY 4 HOURS PRN
Status: DISCONTINUED | OUTPATIENT
Start: 2024-08-09 | End: 2024-08-15 | Stop reason: HOSPADM

## 2024-08-09 RX ORDER — MORPHINE SULFATE 4 MG/ML
4 INJECTION, SOLUTION INTRAMUSCULAR; INTRAVENOUS
Status: COMPLETED | OUTPATIENT
Start: 2024-08-09 | End: 2024-08-09

## 2024-08-09 RX ORDER — LEVOFLOXACIN 5 MG/ML
750 INJECTION, SOLUTION INTRAVENOUS
Status: DISCONTINUED | OUTPATIENT
Start: 2024-08-09 | End: 2024-08-09

## 2024-08-09 RX ORDER — SULFAMETHOXAZOLE AND TRIMETHOPRIM 800; 160 MG/1; MG/1
1 TABLET ORAL 2 TIMES DAILY
Status: ON HOLD | COMMUNITY
End: 2024-08-15 | Stop reason: HOSPADM

## 2024-08-09 RX ORDER — SODIUM CHLORIDE 9 MG/ML
INJECTION, SOLUTION INTRAVENOUS CONTINUOUS
Status: DISCONTINUED | OUTPATIENT
Start: 2024-08-10 | End: 2024-08-15 | Stop reason: HOSPADM

## 2024-08-09 RX ORDER — TALC
6 POWDER (GRAM) TOPICAL NIGHTLY PRN
Status: DISCONTINUED | OUTPATIENT
Start: 2024-08-09 | End: 2024-08-15 | Stop reason: HOSPADM

## 2024-08-09 RX ORDER — INSULIN ASPART 100 [IU]/ML
0-5 INJECTION, SOLUTION INTRAVENOUS; SUBCUTANEOUS
Status: DISCONTINUED | OUTPATIENT
Start: 2024-08-09 | End: 2024-08-15 | Stop reason: HOSPADM

## 2024-08-09 RX ORDER — ZOLPIDEM TARTRATE 5 MG/1
10 TABLET ORAL NIGHTLY PRN
Status: DISCONTINUED | OUTPATIENT
Start: 2024-08-09 | End: 2024-08-15 | Stop reason: HOSPADM

## 2024-08-09 RX ORDER — LORAZEPAM 0.5 MG/1
0.5 TABLET ORAL EVERY 12 HOURS PRN
Status: DISCONTINUED | OUTPATIENT
Start: 2024-08-09 | End: 2024-08-15 | Stop reason: HOSPADM

## 2024-08-09 RX ORDER — ALUMINUM HYDROXIDE, MAGNESIUM HYDROXIDE, AND SIMETHICONE 1200; 120; 1200 MG/30ML; MG/30ML; MG/30ML
30 SUSPENSION ORAL 4 TIMES DAILY PRN
Status: DISCONTINUED | OUTPATIENT
Start: 2024-08-09 | End: 2024-08-15 | Stop reason: HOSPADM

## 2024-08-09 RX ORDER — METRONIDAZOLE 500 MG/100ML
500 INJECTION, SOLUTION INTRAVENOUS
Status: DISCONTINUED | OUTPATIENT
Start: 2024-08-10 | End: 2024-08-09

## 2024-08-09 RX ADMIN — BUSPIRONE HYDROCHLORIDE 10 MG: 5 TABLET ORAL at 10:08

## 2024-08-09 RX ADMIN — HYDROCODONE BITARTRATE AND ACETAMINOPHEN 1 TABLET: 5; 325 TABLET ORAL at 10:08

## 2024-08-09 RX ADMIN — SODIUM CHLORIDE: 9 INJECTION, SOLUTION INTRAVENOUS at 11:08

## 2024-08-09 RX ADMIN — ONDANSETRON 4 MG: 2 INJECTION INTRAMUSCULAR; INTRAVENOUS at 12:08

## 2024-08-09 RX ADMIN — SODIUM CHLORIDE 1000 ML: 9 INJECTION, SOLUTION INTRAVENOUS at 12:08

## 2024-08-09 RX ADMIN — CEFEPIME 1 G: 1 INJECTION, POWDER, FOR SOLUTION INTRAMUSCULAR; INTRAVENOUS at 11:08

## 2024-08-09 RX ADMIN — ZOLPIDEM TARTRATE 10 MG: 5 TABLET, COATED ORAL at 10:08

## 2024-08-09 RX ADMIN — METRONIDAZOLE 500 MG: 500 INJECTION, SOLUTION INTRAVENOUS at 08:08

## 2024-08-09 RX ADMIN — CHOLESTYRAMINE LIGHT 4 G: 4 POWDER, FOR SUSPENSION ORAL at 10:08

## 2024-08-09 RX ADMIN — SODIUM CHLORIDE 1845 ML: 9 INJECTION, SOLUTION INTRAVENOUS at 03:08

## 2024-08-09 RX ADMIN — ONDANSETRON 4 MG: 2 INJECTION INTRAMUSCULAR; INTRAVENOUS at 07:08

## 2024-08-09 RX ADMIN — IOHEXOL 100 ML: 350 INJECTION, SOLUTION INTRAVENOUS at 06:08

## 2024-08-09 RX ADMIN — MORPHINE SULFATE 4 MG: 4 INJECTION, SOLUTION INTRAMUSCULAR; INTRAVENOUS at 03:08

## 2024-08-09 RX ADMIN — LEVOFLOXACIN 750 MG: 750 INJECTION, SOLUTION INTRAVENOUS at 10:08

## 2024-08-09 RX ADMIN — MORPHINE SULFATE 4 MG: 4 INJECTION, SOLUTION INTRAMUSCULAR; INTRAVENOUS at 07:08

## 2024-08-09 RX ADMIN — PIPERACILLIN SODIUM AND TAZOBACTAM SODIUM 4.5 G: 4; .5 INJECTION, POWDER, LYOPHILIZED, FOR SOLUTION INTRAVENOUS at 02:08

## 2024-08-09 RX ADMIN — VANCOMYCIN HYDROCHLORIDE 2000 MG: 5 INJECTION, POWDER, LYOPHILIZED, FOR SOLUTION INTRAVENOUS at 11:08

## 2024-08-09 NOTE — PROVIDER PROGRESS NOTES - EMERGENCY DEPT.
Encounter Date: 8/9/2024    ED Physician Progress Notes        Physician Note:   Patient is currently septic from a likely intra-abdominal source.  We will need CT scan for further evaluation.  Creatinine is elevated to 2.0 up from baseline of 1.4.  We will rehydrate to protect kidneys.    Huey Alba MD  08/09/2024 3:59 PM

## 2024-08-09 NOTE — ED PROVIDER NOTES
Encounter Date: 8/9/2024       History     Chief Complaint   Patient presents with    Vomiting    Diarrhea     X 1 week     HPI    Jacoby Jean-Baptiste is a 49 y.o. male with a past medical history of ulcerative colitis that presented emergency department for evaluation of nausea, vomiting, and diarrhea times approximately 1 week.  Patient initially presented to the ED on 08/04 and was diagnosed with proctocolitis.   He was having nausea, vomiting, and diarrhea at that time. Discharged with Levaquin and Flagyl.   Since that time, he has had difficulty keeping anything down.  He states that he has had nonbloody nonbilious vomiting and has had upwards of 8 episodes in the past 48 hours.  Also stating that he has greater than 15 episodes of diarrhea in the past 48 hours.  Diarrhea is watery and nonbloody.  He states that he chronically has some diarrhea associated with his UC, but this appears different.  He is also complaining of left lower quadrant abdominal pain.  He did endorse recent antibiotic use beginning on 8/1 in which he had a abscess on his left upper arm.  Treated with doxycycline.  Endorses decreased urine output.  Denies fevers, chest pain, shortness of breath, numbness, weakness, and dysuria.    Review of patient's allergies indicates:  No Known Allergies  Past Medical History:   Diagnosis Date    C. difficile colitis     Cavitary pneumonia 11/2023    pos aspergillus serology    Diabetes mellitus 01/2022    Hyperlipidemia 2015    Hypertension 2015    Insomnia 2015    Ulcerative colitis      Past Surgical History:   Procedure Laterality Date    BRONCHOSCOPY WITH FLUOROSCOPY Left 11/20/2023    Procedure: BRONCHOSCOPY, WITH FLUOROSCOPY;  Surgeon: Lisa Villarreal MD;  Location: [facility] ENDO;  Service: Pulmonary;  Laterality: Left;    BRONCHOSCOPY WITH FLUOROSCOPY N/A 11/30/2023    Procedure: BRONCHOSCOPY, WITH FLUOROSCOPY;  Surgeon: Lisa Villarreal MD;  Location: [facility] ENDO;  Service: Pulmonary;  Laterality:  N/A;    CARDIAC ELECTROPHYSIOLOGY MAPPING AND ABLATION  2017    SVT    CHOLECYSTECTOMY  2011    COLONOSCOPY N/A 5/3/2022    Procedure: COLONOSCOPY;  Surgeon: Shan Jean III, MD;  Location: [facility];  Service: Endoscopy;  Laterality: N/A;    COLONOSCOPY N/A 3/16/2024    Procedure: COLONOSCOPY;  Surgeon: ALEKSANDER Ramos MD;  Location: [facility];  Service: Endoscopy;  Laterality: N/A;    COLONOSCOPY WITH FECAL MICROBIOTA TRANSFER N/A 3/21/2023    Procedure: COLONOSCOPY, WITH FECAL MICROBIOTA TRANSFER;  Surgeon: David Millan MD;  Location: [facility];  Service: Endoscopy;  Laterality: N/A;    ESOPHAGOGASTRODUODENOSCOPY N/A 4/24/2023    Procedure: EGD (ESOPHAGOGASTRODUODENOSCOPY);  Surgeon: Shan Jean III, MD;  Location: [facility];  Service: Endoscopy;  Laterality: N/A;    ESOPHAGOGASTRODUODENOSCOPY N/A 6/5/2024    Procedure: EGD (ESOPHAGOGASTRODUODENOSCOPY);  Surgeon: Odalis Mccabe MD;  Location: [facility];  Service: Endoscopy;  Laterality: N/A;    FLEXIBLE SIGMOIDOSCOPY N/A 6/5/2024    Procedure: SIGMOIDOSCOPY, FLEXIBLE;  Surgeon: Odalis Mccabe MD;  Location: [facility];  Service: Endoscopy;  Laterality: N/A;    FLEXIBLE SIGMOIDOSCOPY N/A 7/16/2024    Procedure: SIGMOIDOSCOPY, FLEXIBLE;  Surgeon: Shan Jean III, MD;  Location: [facility];  Service: Endoscopy;  Laterality: N/A;    GANGLION CYST EXCISION Left 1992    UMBILICAL HERNIA REPAIR  2011    with mesh     Family History   Problem Relation Name Age of Onset    Asthma Mother       Social History     Tobacco Use    Smoking status: Former     Current packs/day: 1.00     Average packs/day: 1 pack/day for 31.6 years (31.6 ttl pk-yrs)     Types: Cigarettes     Start date: 1993    Smokeless tobacco: Never    Tobacco comments:     Pt states he has stopped smoking with Chantix three weeks ago. 12/1/23   Substance Use Topics    Alcohol use: Yes     Comment: ocass    Drug use: Not Currently     Review of Systems   Constitutional:   Negative for fever.   HENT:  Negative for sore throat.    Respiratory:  Negative for cough and shortness of breath.    Cardiovascular:  Negative for chest pain and leg swelling.   Gastrointestinal:  Positive for abdominal pain, diarrhea, nausea and vomiting.   Genitourinary:  Negative for dysuria, hematuria and urgency.   Musculoskeletal:  Negative for back pain.   Skin:  Negative for rash.   Neurological:  Negative for weakness.   Hematological:  Does not bruise/bleed easily.       Physical Exam     Initial Vitals [08/09/24 1110]   BP Pulse Resp Temp SpO2   (!) 139/93 97 20 98.2 °F (36.8 °C) 99 %      MAP       --         Physical Exam    Nursing note and vitals reviewed.  Constitutional: He appears well-developed and well-nourished.   HENT:   Head: Normocephalic and atraumatic.   Eyes: EOM are normal. Pupils are equal, round, and reactive to light.   Neck:   Normal range of motion.  Cardiovascular:  Normal rate, regular rhythm and normal heart sounds.           Pulmonary/Chest: Breath sounds normal. No respiratory distress.   Abdominal: Abdomen is soft. He exhibits no distension. There is abdominal tenderness (Left lower quadrant). There is no rebound.   Musculoskeletal:         General: Normal range of motion.      Cervical back: Normal range of motion.     Neurological: He is alert and oriented to person, place, and time. He has normal strength. No cranial nerve deficit or sensory deficit. GCS score is 15. GCS eye subscore is 4. GCS verbal subscore is 5. GCS motor subscore is 6.   Skin: Capillary refill takes less than 2 seconds.   Psychiatric: He has a normal mood and affect.         ED Course   Critical Care    Date/Time: 8/9/2024 9:34 PM    Performed by: Huey Alba MD  Authorized by: Cele Moffett MD  Direct patient critical care time: 10 minutes  Ordering / reviewing critical care time: 10 minutes  Documentation critical care time: 10 minutes  Consulting other physicians critical care time:  5 minutes  Total critical care time (exclusive of procedural time) : 35 minutes  Critical care was necessary to treat or prevent imminent or life-threatening deterioration of the following conditions: sepsis.  Critical care was time spent personally by me on the following activities: re-evaluation of patient's condition, pulse oximetry, ordering and review of laboratory studies, ordering and review of radiographic studies, ordering and performing treatments and interventions, obtaining history from patient or surrogate, examination of patient, discussions with consultants, interpretation of cardiac output measurements and evaluation of patient's response to treatment.        Labs Reviewed   CBC W/ AUTO DIFFERENTIAL - Abnormal       Result Value    WBC 17.28 (*)     RBC 4.61      Hemoglobin 14.3      Hematocrit 43.2      MCV 94      MCH 31.0      MCHC 33.1      RDW 14.6 (*)     Platelets 296      MPV 10.1      Immature Granulocytes 1.0 (*)     Gran # (ANC) 13.5 (*)     Immature Grans (Abs) 0.18 (*)     Lymph # 1.9      Mono # 1.3 (*)     Eos # 0.3      Baso # 0.10      nRBC 0      Gran % 78.0 (*)     Lymph % 11.2 (*)     Mono % 7.6      Eosinophil % 1.6      Basophil % 0.6      Differential Method Automated     COMPREHENSIVE METABOLIC PANEL - Abnormal    Sodium 134 (*)     Potassium 4.0      Chloride 103      CO2 20 (*)     Glucose 82      BUN 10      Creatinine 2.0 (*)     Calcium 8.8      Total Protein 7.3      Albumin 3.6      Total Bilirubin 0.3      Alkaline Phosphatase 77      AST 11      ALT 6 (*)     eGFR 39.9 (*)     Anion Gap 11     URINALYSIS, REFLEX TO URINE CULTURE - Abnormal    Specimen UA Urine, Clean Catch      Color, UA Yellow      Appearance, UA Clear      pH, UA 6.0      Specific Gravity, UA 1.020      Protein, UA Trace (*)     Glucose, UA Negative      Ketones, UA Negative      Bilirubin (UA) Negative      Occult Blood UA Negative      Nitrite, UA Negative      Urobilinogen, UA Negative       Leukocytes, UA 1+ (*)     Narrative:     Specimen Source->Urine   URINALYSIS MICROSCOPIC - Abnormal    RBC, UA 1      WBC, UA 4      Bacteria Rare      Squam Epithel, UA 1      Hyaline Casts,  (*)     Microscopic Comment SEE COMMENT      Narrative:     Specimen Source->Urine   CLOSTRIDIUM DIFFICILE    C. diff Antigen Negative      C difficile Toxins A+B, EIA Negative     CULTURE, BLOOD   CULTURE, BLOOD    Narrative:     Collection has been rescheduled by [facility] at 08/09/2024 15:00 Reason:   Unable to collect  Collection has been rescheduled by CZB at 08/09/2024 15:00 Reason:   Unable to collect   LIPASE    Lipase 4     TROPONIN I HIGH SENSITIVITY   TROPONIN I HIGH SENSITIVITY    Troponin I High Sensitivity 6.0     POCT GLUCOSE, HAND-HELD DEVICE   ISTAT LACTATE    POC Lactate 1.69      Sample VENOUS     POCT LACTATE   POCT GLUCOSE MONITORING CONTINUOUS        ECG Results              EKG 12-lead (Final result)        Collection Time Result Time QRS Duration OHS QTC Calculation    08/09/24 12:52:28 08/09/24 21:06:17 78 433                     Final result by Interface, Lab In [facility] (08/09/24 21:06:23)                   Narrative:    Test Reason : R11.10,    Vent. Rate : 078 BPM     Atrial Rate : 078 BPM     P-R Int : 128 ms          QRS Dur : 078 ms      QT Int : 380 ms       P-R-T Axes : 009 022 022 degrees     QTc Int : 433 ms    Normal sinus rhythm  Normal ECG  Confirmed by Del RENTERIA, Josh Reddy ([id number]) on 8/9/2024 9:06:15 PM    Referred By: [email or web address]   SELF           Confirmed By:Josh Mathis MD                                  Imaging Results              CT Abdomen Pelvis With IV Contrast NO Oral Contrast (Final result)  Result time 08/09/24 19:21:18      Final result by Kirk Dooley MD (08/09/24 19:21:18)                   Impression:      Redemonstration of findings suggesting a nonspecific proctocolitis including infectious or inflammatory etiologies.  Overall extent appears mildly  increased in length/extent compared to prior examination of 08/04/2024.  No evidence of abscess or free intraperitoneal air.    Stable appearing 0.4 cm calcification within the proximal right ureter without evidence of hydronephrosis.  Nonobstructing left-sided nephrolithiasis.    Additional stable findings as above.      Electronically signed by: Kirk Dooley MD  Date:    08/09/2024  Time:    19:21               Narrative:    EXAMINATION:  CT ABDOMEN PELVIS WITH IV CONTRAST    CLINICAL HISTORY:  LLQ abdominal pain;    TECHNIQUE:  Low dose axial images, sagittal and coronal reformations were obtained from the lung bases to the pubic symphysis following the IV administration of 100 mL of Omnipaque 350 .  Oral contrast was not given.    COMPARISON:  08/04/2024    FINDINGS:  The lung bases are unremarkable.  There is no pleural fluid present.  The visualized portions of the heart appear normal.    There is decreased attenuation of the hepatic parenchyma suggesting hepatic steatosis.  The portal vein is patent.  The gallbladder is surgically absent.  There is no intra-or extrahepatic biliary ductal dilatation.    The stomach, spleen, pancreas, and adrenal glands are unremarkable.    Kidneys are normal in size and location enhance symmetrically.  There is a stable appearing 0.4 cm calcification within the proximal right ureter without significant hydronephrosis.  There is nonobstructing left-sided nephrolithiasis.  Urinary bladder appears within normal limits.  Prostate is unremarkable.  No significant free fluid in the pelvis.    The abdominal aorta is normal in course and caliber with mild atherosclerotic calcification along its course.  No retroperitoneal hematoma.    No evidence of small-bowel obstruction.  The terminal ileum is unremarkable.  Redemonstration of abnormal long segment proctocolitis extending from the rectum to the hepatic flexure colon.  This appears mildly increased in overall length/extent  compared to prior examination.  No evidence of abscess or free intraperitoneal air.  Appendix is not definitively visualized.  There are scattered shotty small mesenteric and retroperitoneal lymph nodes.    Osseous structures demonstrate mild degenerative changes.  The extraperitoneal soft tissues are unremarkable.                                       X-Ray Chest AP Portable (Final result)  Result time 08/09/24 14:12:15      Final result by Kelton Wells IV, MD (08/09/24 14:12:15)                   Impression:      No acute cardiopulmonary disease.      Electronically signed by: Kelton Wells  Date:    08/09/2024  Time:    14:12               Narrative:    EXAMINATION:  XR CHEST AP PORTABLE    CLINICAL HISTORY:  Sepsis;    COMPARISON:  07/28/2024    FINDINGS:  The patient is rotated.    Cardiac size is within normal limits.  Central pulmonary vasculature is not acutely engorged.    No confluent infiltrate or significant volume loss is observed.  No effusion is demonstrated.  There is mild apical pleural thickening bilaterally.                                       Medications   sodium chloride 0.9% flush 2 mL (has no administration in time range)   melatonin tablet 6 mg (has no administration in time range)   ondansetron injection 4 mg (has no administration in time range)   acetaminophen tablet 650 mg (has no administration in time range)   aluminum-magnesium hydroxide-simethicone 200-200-20 mg/5 mL suspension 30 mL (has no administration in time range)   acetaminophen tablet 650 mg (has no administration in time range)   HYDROcodone-acetaminophen 5-325 mg per tablet 1 tablet (has no administration in time range)   naloxone 0.4 mg/mL injection 0.02 mg (has no administration in time range)   potassium bicarbonate disintegrating tablet 50 mEq (has no administration in time range)   potassium bicarbonate disintegrating tablet 35 mEq (has no administration in time range)   potassium bicarbonate disintegrating tablet  60 mEq (has no administration in time range)   magnesium oxide tablet 800 mg (has no administration in time range)   magnesium oxide tablet 800 mg (has no administration in time range)   potassium, sodium phosphates 280-160-250 mg packet 2 packet (has no administration in time range)   potassium, sodium phosphates 280-160-250 mg packet 2 packet (has no administration in time range)   potassium, sodium phosphates 280-160-250 mg packet 2 packet (has no administration in time range)   glucose chewable tablet 16 g (has no administration in time range)   glucose chewable tablet 24 g (has no administration in time range)   dextrose 50% injection 12.5 g (has no administration in time range)   dextrose 50% injection 25 g (has no administration in time range)   glucagon (human recombinant) injection 1 mg (has no administration in time range)   insulin aspart U-100 pen 0-5 Units (has no administration in time range)   levoFLOXacin 750 mg/150 mL IVPB 750 mg (has no administration in time range)   metronidazole IVPB 500 mg (500 mg Intravenous New Bag 8/9/24 2056)   busPIRone tablet 10 mg (has no administration in time range)   hyoscyamine 0.125 mg/mL oral solution (PEDs > 10 kg and 2-12 y.o.) 0.125 mg (has no administration in time range)   LORazepam tablet 0.5 mg (has no administration in time range)   metoprolol succinate (TOPROL-XL) 24 hr tablet 50 mg (has no administration in time range)   sertraline tablet 100 mg (has no administration in time range)   atorvastatin tablet 10 mg (has no administration in time range)   zolpidem tablet 10 mg (has no administration in time range)   cholestyramine-aspartame 4 gram packet 4 g (has no administration in time range)   lipase-protease-amylase 12,000-38,000-60,000 units per capsule 3 capsule (has no administration in time range)   sodium chloride 0.9% bolus 1,000 mL 1,000 mL (0 mLs Intravenous Stopped 8/9/24 1324)   ondansetron injection 4 mg (4 mg Intravenous Given 8/9/24 1238)    sodium chloride 0.9% bolus 1,845 mL 1,845 mL (0 mLs Intravenous Stopped 8/9/24 1628)   piperacillin-tazobactam 4.5 g in dextrose 5 % 100 mL IVPB (ready to mix) (0 g Intravenous Stopped 8/9/24 1526)   morphine injection 4 mg (4 mg Intravenous Given 8/9/24 1521)   morphine injection 4 mg (4 mg Intravenous Given 8/9/24 1929)   ondansetron injection 4 mg (4 mg Intravenous Given 8/9/24 1929)   iohexoL (OMNIPAQUE 350) injection 100 mL (100 mLs Intravenous Given 8/9/24 1817)     Medical Decision Making  Jacoby Jean-Baptiste is a 49 y.o. male with a past medical history of ulcerative colitis that presented emergency department for evaluation of nausea, vomiting, and diarrhea times approximately 1 week.  Recently diagnosed with proctocolitis.  Vitals upon arrival with heart rate of 97 but otherwise stable.  Exam with nontoxic male.  Left lower quadrant abdominal tenderness.  Lungs are clear.  Heart sounds within normal limits.  No peritoneal signs.  Differential included but not limited to intra-abdominal abscess, proctocolitis, sepsis, pneumonia, UTI, pyelonephritis, and cellulitis.    Amount and/or Complexity of Data Reviewed  Labs: ordered.     Details: UA without evidence of infection.  C diff negative.  Lactic within normal limits.  White count of 17.28.  Creatinine of 2.0.  Lipase negative.  Radiology: ordered.     Details: CT shows proctocolitis without surgical abdomen.  ECG/medicine tests: ordered and independent interpretation performed.     Details: NSR without acute ST-T changes.   Discussion of management or test interpretation with external provider(s): Labs consistent with the NAZIA.  CT without surgical pathology. Given fluids and abx. Admitted to hospital medicine.     Risk  Prescription drug management.               ED Course as of 08/09/24 2135   Fri Aug 09, 2024   2135 This patient does have evidence of infective focus  My overall impression is sepsis.  Source: Abdominal  Antibiotics given- Antibiotics (72h  ago, onward)    Start     Stop Route Frequency Ordered    08/09/24 2145  levoFLOXacin 750 mg/150 mL IVPB 750 mg         -- IV Every 24 hours (non-standard times) 08/09/24 2038 08/09/24 2145  metronidazole IVPB 500 mg         -- IV Every 8 hours (non-standard times) 08/09/24 2038      Latest lactate reviewed-  @[id number](lactate,poclac)@  Organ dysfunction indicated by Acute kidney injury    Fluid challenge Ideal Body Weight- The patient's ideal body weight is [unfilled] which will be used to calculate fluid bolus of 30 ml/kg for treatment of septic shock.      Post- resuscitation assessment Yes Perfusion exam was performed within 6 hours of septic shock presentation after bolus shows Adequate tissue perfusion assessed by non-invasive monitoring       Will Not start Pressors- Levophed for MAP of 65  Source control achieved by: antibiotics   [PA]      ED Course User Index  [PA] Huey Alba MD                           Clinical Impression:  Final diagnoses:  [R11.10] Vomiting  [A41.9] Sepsis  [K52.9] Proctocolitis (Primary)          ED Disposition Condition    Observation                 Huey Alba MD  08/09/24 2135

## 2024-08-09 NOTE — FIRST PROVIDER EVALUATION
Emergency Department TeleTriage Encounter Note      CHIEF COMPLAINT    Chief Complaint   Patient presents with    Vomiting    Diarrhea     X 1 week       VITAL SIGNS   Initial Vitals [08/09/24 1110]   BP Pulse Resp Temp SpO2   (!) 139/93 97 20 98.2 °F (36.8 °C) 99 %      MAP       --            ALLERGIES    Review of patient's allergies indicates:  No Known Allergies    PROVIDER TRIAGE NOTE  This is a teletriage evaluation of a 49 y.o. male presenting to the ED complaining of abd pain with n/v/d for one week. Pmhx of UC. Denies fever and rectal bleeding. .     Initial orders will be placed and care will be transferred to an alternate provider when patient is roomed for a full evaluation. Any additional orders and the final disposition will be determined by that provider.           ORDERS  Labs Reviewed - No data to display    ED Orders (720h ago, onward)      Start Ordered     Status Ordering Provider    08/09/24 1130 08/09/24 1119  sodium chloride 0.9% bolus 1,000 mL 1,000 mL  Once         Ordered SELAM MCCOY N.    08/09/24 1130 08/09/24 1119  ondansetron injection 4 mg  ED 1 Time         Ordered SELAM MCCOY N.    08/09/24 1120 08/09/24 1119  Vital signs  Every 2 hours         Ordered SELAM MCCOY N.    08/09/24 1120 08/09/24 1119  Diet NPO  Diet effective now         Ordered SELAM MCCOY N.    08/09/24 1120 08/09/24 1119  Insert peripheral IV  Once         Ordered SELAM MCCOY N.    08/09/24 1120 08/09/24 1119  CBC W/ AUTO DIFFERENTIAL  STAT         Ordered SELAM MCCOY N.    08/09/24 1120 08/09/24 1119  Comp. Metabolic Panel  STAT         Ordered SELAM MCCOY.    08/09/24 1120 08/09/24 1119  Lipase  STAT         Ordered SELAM MCCOY.    08/09/24 1120 08/09/24 1119  Urinalysis, Reflex to Urine Culture Urine, Clean Catch  STAT         Ordered SELAM MCCOY              Virtual Visit Note: The provider triage portion  of this emergency department evaluation and documentation was performed via [employer], a HIPAA-compliant telemedicine application, in concert with a tele-presenter in the room. A face to face patient evaluation with one of my colleagues will occur once the patient is placed in an emergency department room.      DISCLAIMER: This note was prepared with [employer] voice recognition transcription software. Garbled syntax, mangled pronouns, and other bizarre constructions may be attributed to that software system.

## 2024-08-10 LAB
ALBUMIN SERPL BCP-MCNC: 3.2 G/DL (ref 3.5–5.2)
ALP SERPL-CCNC: 65 U/L (ref 55–135)
ALT SERPL W/O P-5'-P-CCNC: 5 U/L (ref 10–44)
ANION GAP SERPL CALC-SCNC: 10 MMOL/L (ref 8–16)
AST SERPL-CCNC: 11 U/L (ref 10–40)
ASTROVIRUS: NOT DETECTED
BASOPHILS # BLD AUTO: 0.1 K/UL (ref 0–0.2)
BASOPHILS NFR BLD: 0.7 % (ref 0–1.9)
BILIRUB SERPL-MCNC: 0.3 MG/DL (ref 0.1–1)
BUN SERPL-MCNC: 7 MG/DL (ref 6–20)
C COLI+JEJ+UPSA DNA STL QL NAA+NON-PROBE: NOT DETECTED
CALCIUM SERPL-MCNC: 7.9 MG/DL (ref 8.7–10.5)
CHLORIDE SERPL-SCNC: 106 MMOL/L (ref 95–110)
CO2 SERPL-SCNC: 17 MMOL/L (ref 23–29)
CREAT SERPL-MCNC: 1.4 MG/DL (ref 0.5–1.4)
CYCLOSPORA CAYETANENSIS: NOT DETECTED
DIFFERENTIAL METHOD BLD: ABNORMAL
ENTEROAGGREGATIVE E COLI: NOT DETECTED
ENTEROPATHOGENIC E COLI: NOT DETECTED
EOSINOPHIL # BLD AUTO: 0.3 K/UL (ref 0–0.5)
EOSINOPHIL NFR BLD: 1.9 % (ref 0–8)
ERYTHROCYTE [DISTWIDTH] IN BLOOD BY AUTOMATED COUNT: 14.8 % (ref 11.5–14.5)
EST. GFR  (NO RACE VARIABLE): >60 ML/MIN/1.73 M^2
GLUCOSE SERPL-MCNC: 54 MG/DL (ref 70–110)
GLUCOSE SERPL-MCNC: 65 MG/DL (ref 70–110)
GLUCOSE SERPL-MCNC: 66 MG/DL (ref 70–110)
GLUCOSE SERPL-MCNC: 70 MG/DL (ref 70–110)
GLUCOSE SERPL-MCNC: 72 MG/DL (ref 70–110)
GLUCOSE SERPL-MCNC: 88 MG/DL (ref 70–110)
GPP - ADENOVIRUS 40/41: NOT DETECTED
GPP - CRYPTOSPORIDIUM: NOT DETECTED
GPP - ENTAMOEBA HISTOLYTICA: NOT DETECTED
GPP - ENTEROTOXIGENIC E COLI (ETEC): NOT DETECTED
GPP - GIARDIA LAMBLIA: NOT DETECTED
GPP - NOROVIRUS GI/GII: NOT DETECTED
GPP - ROTAVIRUS A: NOT DETECTED
GPP - SALMONELLA: NOT DETECTED
GPP - VIBRIO CHOLERA: NOT DETECTED
GPP - YERSINIA ENTEROCOLITICA: NOT DETECTED
HCT VFR BLD AUTO: 40.8 % (ref 40–54)
HGB BLD-MCNC: 12.8 G/DL (ref 14–18)
IMM GRANULOCYTES # BLD AUTO: 0.12 K/UL (ref 0–0.04)
IMM GRANULOCYTES NFR BLD AUTO: 0.9 % (ref 0–0.5)
LACTATE PLASV-SCNC: NOT DETECTED MMOL/L
LACTATE SERPL-SCNC: 1.2 MMOL/L (ref 0.5–1.9)
LYMPHOCYTES # BLD AUTO: 1.4 K/UL (ref 1–4.8)
LYMPHOCYTES NFR BLD: 10.3 % (ref 18–48)
MAGNESIUM SERPL-MCNC: 1 MG/DL (ref 1.6–2.6)
MCH RBC QN AUTO: 30.5 PG (ref 27–31)
MCHC RBC AUTO-ENTMCNC: 31.4 G/DL (ref 32–36)
MCV RBC AUTO: 97 FL (ref 82–98)
MONOCYTES # BLD AUTO: 1 K/UL (ref 0.3–1)
MONOCYTES NFR BLD: 7.1 % (ref 4–15)
MRSA SCREEN BY PCR: NEGATIVE
NEUTROPHILS # BLD AUTO: 10.9 K/UL (ref 1.8–7.7)
NEUTROPHILS NFR BLD: 79.1 % (ref 38–73)
NRBC BLD-RTO: 0 /100 WBC
PLATELET # BLD AUTO: 263 K/UL (ref 150–450)
PLESIOMONAS SHIGELLOIDES: NOT DETECTED
PMV BLD AUTO: 9.7 FL (ref 9.2–12.9)
POTASSIUM SERPL-SCNC: 4.2 MMOL/L (ref 3.5–5.1)
PROT SERPL-MCNC: 6.3 G/DL (ref 6–8.4)
RBC # BLD AUTO: 4.2 M/UL (ref 4.6–6.2)
SAPOVIRUS: NOT DETECTED
SHIGELLA SP+EIEC IPAH STL QL NAA+PROBE: NOT DETECTED
SODIUM SERPL-SCNC: 133 MMOL/L (ref 136–145)
VIBRIO: NOT DETECTED
WBC # BLD AUTO: 13.75 K/UL (ref 3.9–12.7)

## 2024-08-10 PROCEDURE — 87641 MR-STAPH DNA AMP PROBE: CPT | Performed by: STUDENT IN AN ORGANIZED HEALTH CARE EDUCATION/TRAINING PROGRAM

## 2024-08-10 PROCEDURE — 36415 COLL VENOUS BLD VENIPUNCTURE: CPT | Performed by: STUDENT IN AN ORGANIZED HEALTH CARE EDUCATION/TRAINING PROGRAM

## 2024-08-10 PROCEDURE — 85025 COMPLETE CBC W/AUTO DIFF WBC: CPT | Performed by: NURSE PRACTITIONER

## 2024-08-10 PROCEDURE — 83605 ASSAY OF LACTIC ACID: CPT | Performed by: STUDENT IN AN ORGANIZED HEALTH CARE EDUCATION/TRAINING PROGRAM

## 2024-08-10 PROCEDURE — 87507 IADNA-DNA/RNA PROBE TQ 12-25: CPT | Performed by: HOSPITALIST

## 2024-08-10 PROCEDURE — 25000003 PHARM REV CODE 250: Performed by: NURSE PRACTITIONER

## 2024-08-10 PROCEDURE — 36415 COLL VENOUS BLD VENIPUNCTURE: CPT | Performed by: NURSE PRACTITIONER

## 2024-08-10 PROCEDURE — 94760 N-INVAS EAR/PLS OXIMETRY 1: CPT

## 2024-08-10 PROCEDURE — 63600175 PHARM REV CODE 636 W HCPCS: Performed by: NURSE PRACTITIONER

## 2024-08-10 PROCEDURE — 87045 FECES CULTURE AEROBIC BACT: CPT | Performed by: STUDENT IN AN ORGANIZED HEALTH CARE EDUCATION/TRAINING PROGRAM

## 2024-08-10 PROCEDURE — 25000003 PHARM REV CODE 250: Performed by: STUDENT IN AN ORGANIZED HEALTH CARE EDUCATION/TRAINING PROGRAM

## 2024-08-10 PROCEDURE — 80053 COMPREHEN METABOLIC PANEL: CPT | Performed by: NURSE PRACTITIONER

## 2024-08-10 PROCEDURE — 83735 ASSAY OF MAGNESIUM: CPT | Performed by: NURSE PRACTITIONER

## 2024-08-10 PROCEDURE — 99223 1ST HOSP IP/OBS HIGH 75: CPT | Mod: ,,, | Performed by: STUDENT IN AN ORGANIZED HEALTH CARE EDUCATION/TRAINING PROGRAM

## 2024-08-10 PROCEDURE — 87449 NOS EACH ORGANISM AG IA: CPT | Performed by: STUDENT IN AN ORGANIZED HEALTH CARE EDUCATION/TRAINING PROGRAM

## 2024-08-10 PROCEDURE — 99900035 HC TECH TIME PER 15 MIN (STAT)

## 2024-08-10 PROCEDURE — 27000207 HC ISOLATION

## 2024-08-10 PROCEDURE — 25000003 PHARM REV CODE 250: Performed by: HOSPITALIST

## 2024-08-10 PROCEDURE — 63600175 PHARM REV CODE 636 W HCPCS: Performed by: HOSPITALIST

## 2024-08-10 PROCEDURE — 87046 STOOL CULTR AEROBIC BACT EA: CPT | Mod: 59 | Performed by: STUDENT IN AN ORGANIZED HEALTH CARE EDUCATION/TRAINING PROGRAM

## 2024-08-10 PROCEDURE — 21400001 HC TELEMETRY ROOM

## 2024-08-10 PROCEDURE — 86140 C-REACTIVE PROTEIN: CPT | Performed by: STUDENT IN AN ORGANIZED HEALTH CARE EDUCATION/TRAINING PROGRAM

## 2024-08-10 PROCEDURE — 82962 GLUCOSE BLOOD TEST: CPT

## 2024-08-10 PROCEDURE — 63600175 PHARM REV CODE 636 W HCPCS: Mod: JZ,JG | Performed by: STUDENT IN AN ORGANIZED HEALTH CARE EDUCATION/TRAINING PROGRAM

## 2024-08-10 RX ORDER — DEXAMETHASONE 4 MG/1
12 TABLET ORAL DAILY
Status: DISCONTINUED | OUTPATIENT
Start: 2024-08-10 | End: 2024-08-11

## 2024-08-10 RX ORDER — METRONIDAZOLE 500 MG/100ML
500 INJECTION, SOLUTION INTRAVENOUS
Status: DISCONTINUED | OUTPATIENT
Start: 2024-08-10 | End: 2024-08-11

## 2024-08-10 RX ORDER — HYDROCODONE BITARTRATE AND ACETAMINOPHEN 5; 325 MG/1; MG/1
1 TABLET ORAL EVERY 4 HOURS PRN
Status: DISCONTINUED | OUTPATIENT
Start: 2024-08-10 | End: 2024-08-15 | Stop reason: HOSPADM

## 2024-08-10 RX ORDER — HYDROCODONE BITARTRATE AND ACETAMINOPHEN 10; 325 MG/1; MG/1
1 TABLET ORAL EVERY 4 HOURS PRN
Status: DISCONTINUED | OUTPATIENT
Start: 2024-08-10 | End: 2024-08-15 | Stop reason: HOSPADM

## 2024-08-10 RX ORDER — MAGNESIUM SULFATE HEPTAHYDRATE 40 MG/ML
2 INJECTION, SOLUTION INTRAVENOUS ONCE
Status: COMPLETED | OUTPATIENT
Start: 2024-08-10 | End: 2024-08-10

## 2024-08-10 RX ORDER — MORPHINE SULFATE 2 MG/ML
2 INJECTION, SOLUTION INTRAMUSCULAR; INTRAVENOUS EVERY 4 HOURS PRN
Status: DISCONTINUED | OUTPATIENT
Start: 2024-08-10 | End: 2024-08-15 | Stop reason: HOSPADM

## 2024-08-10 RX ADMIN — ZOLPIDEM TARTRATE 10 MG: 5 TABLET, COATED ORAL at 09:08

## 2024-08-10 RX ADMIN — BUSPIRONE HYDROCHLORIDE 10 MG: 5 TABLET ORAL at 03:08

## 2024-08-10 RX ADMIN — CHOLESTYRAMINE LIGHT 4 G: 4 POWDER, FOR SUSPENSION ORAL at 08:08

## 2024-08-10 RX ADMIN — PANCRELIPASE 3 CAPSULE: 60000; 12000; 38000 CAPSULE, DELAYED RELEASE PELLETS ORAL at 04:08

## 2024-08-10 RX ADMIN — VANCOMYCIN HYDROCHLORIDE 250 MG: KIT at 10:08

## 2024-08-10 RX ADMIN — CEFTRIAXONE SODIUM 2 G: 2 INJECTION, POWDER, FOR SOLUTION INTRAMUSCULAR; INTRAVENOUS at 04:08

## 2024-08-10 RX ADMIN — BUSPIRONE HYDROCHLORIDE 10 MG: 5 TABLET ORAL at 09:08

## 2024-08-10 RX ADMIN — METOPROLOL SUCCINATE 50 MG: 50 TABLET, FILM COATED, EXTENDED RELEASE ORAL at 08:08

## 2024-08-10 RX ADMIN — DEXAMETHASONE 12 MG: 4 TABLET ORAL at 03:08

## 2024-08-10 RX ADMIN — SERTRALINE HYDROCHLORIDE 100 MG: 50 TABLET ORAL at 08:08

## 2024-08-10 RX ADMIN — PANCRELIPASE 3 CAPSULE: 60000; 12000; 38000 CAPSULE, DELAYED RELEASE PELLETS ORAL at 12:08

## 2024-08-10 RX ADMIN — ATORVASTATIN CALCIUM 10 MG: 10 TABLET, FILM COATED ORAL at 08:08

## 2024-08-10 RX ADMIN — HYDROCODONE BITARTRATE AND ACETAMINOPHEN 1 TABLET: 5; 325 TABLET ORAL at 04:08

## 2024-08-10 RX ADMIN — Medication 16 G: at 11:08

## 2024-08-10 RX ADMIN — PANCRELIPASE 3 CAPSULE: 60000; 12000; 38000 CAPSULE, DELAYED RELEASE PELLETS ORAL at 07:08

## 2024-08-10 RX ADMIN — METRONIDAZOLE 500 MG: 500 INJECTION, SOLUTION INTRAVENOUS at 01:08

## 2024-08-10 RX ADMIN — METRONIDAZOLE 500 MG: 500 INJECTION, SOLUTION INTRAVENOUS at 05:08

## 2024-08-10 RX ADMIN — HYDROCODONE BITARTRATE AND ACETAMINOPHEN 1 TABLET: 10; 325 TABLET ORAL at 03:08

## 2024-08-10 RX ADMIN — BUSPIRONE HYDROCHLORIDE 10 MG: 5 TABLET ORAL at 08:08

## 2024-08-10 RX ADMIN — ONDANSETRON 4 MG: 2 INJECTION INTRAMUSCULAR; INTRAVENOUS at 08:08

## 2024-08-10 RX ADMIN — MAGNESIUM SULFATE HEPTAHYDRATE 2 G: 40 INJECTION, SOLUTION INTRAVENOUS at 11:08

## 2024-08-10 RX ADMIN — HYDROCODONE BITARTRATE AND ACETAMINOPHEN 1 TABLET: 5; 325 TABLET ORAL at 11:08

## 2024-08-10 RX ADMIN — CEFEPIME 1 G: 1 INJECTION, POWDER, FOR SOLUTION INTRAMUSCULAR; INTRAVENOUS at 12:08

## 2024-08-10 RX ADMIN — ONDANSETRON 4 MG: 2 INJECTION INTRAMUSCULAR; INTRAVENOUS at 09:08

## 2024-08-10 RX ADMIN — METRONIDAZOLE 500 MG: 5 INJECTION, SOLUTION INTRAVENOUS at 09:08

## 2024-08-10 RX ADMIN — HYDROCODONE BITARTRATE AND ACETAMINOPHEN 1 TABLET: 10; 325 TABLET ORAL at 09:08

## 2024-08-10 RX ADMIN — MORPHINE SULFATE 2 MG: 2 INJECTION, SOLUTION INTRAMUSCULAR; INTRAVENOUS at 11:08

## 2024-08-10 RX ADMIN — SODIUM CHLORIDE: 9 INJECTION, SOLUTION INTRAVENOUS at 01:08

## 2024-08-10 NOTE — PLAN OF CARE
Problem: Adult Inpatient Plan of Care  Goal: Plan of Care Review  Outcome: Progressing  Goal: Patient-Specific Goal (Individualized)  Outcome: Progressing  Goal: Absence of Hospital-Acquired Illness or Injury  Outcome: Progressing  Goal: Optimal Comfort and Wellbeing  Outcome: Progressing  Goal: Readiness for Transition of Care  Outcome: Progressing     Problem: Bariatric Environmental Safety  Goal: Safety Maintained with Care  Outcome: Progressing     Problem: Infection  Goal: Absence of Infection Signs and Symptoms  Outcome: Progressing     Problem: Sepsis/Septic Shock  Goal: Optimal Coping  Outcome: Progressing  Goal: Absence of Bleeding  Outcome: Progressing  Goal: Blood Glucose Level Within Targeted Range  Outcome: Progressing  Goal: Absence of Infection Signs and Symptoms  Outcome: Progressing  Goal: Optimal Nutrition Intake  Outcome: Progressing     Problem: Diabetes Comorbidity  Goal: Blood Glucose Level Within Targeted Range  Outcome: Progressing     Problem: Wound  Goal: Optimal Coping  Outcome: Progressing  Goal: Optimal Functional Ability  Outcome: Progressing  Goal: Absence of Infection Signs and Symptoms  Outcome: Progressing  Goal: Improved Oral Intake  Outcome: Progressing  Goal: Optimal Pain Control and Function  Outcome: Progressing  Goal: Skin Health and Integrity  Outcome: Progressing  Goal: Optimal Wound Healing  Outcome: Progressing

## 2024-08-10 NOTE — NURSING
Nurses Note -- 4 Eyes      8/9/2024   10:55 PM      Skin assessed during: Admit      [] No Altered Skin Integrity Present    []Prevention Measures Documented      [x] Yes- Altered Skin Integrity Present or Discovered   [x] LDA Added if Not in Epic (Describe Wound)   [x] New Altered Skin Integrity was Present on Admit and Documented in LDA   [x] Wound Image Taken    Wound Care Consulted? Yes    Attending Nurse:  Amarilys Wang RN/Staff Member:  BROOKE Watt

## 2024-08-10 NOTE — ASSESSMENT & PLAN NOTE
"Patient's FSGs are controlled on current medication regimen.  Last A1c reviewed-   Lab Results   Component Value Date    HGBA1C 5.2 06/06/2024     Most recent fingerstick glucose reviewed- No results for input(s): "POCTGLUCOSE" in the last 24 hours.  Current correctional scale  Low  Maintain anti-hyperglycemic dose as follows-   Antihyperglycemics (From admission, onward)      Start     Stop Route Frequency Ordered    08/09/24 2137  insulin aspart U-100 pen 0-5 Units         -- SubQ Before meals & nightly PRN 08/09/24 2038          Hold Oral hypoglycemics while patient is in the hospital.  Hold metformin, Ozempic-- start low dose SSI  "

## 2024-08-10 NOTE — ASSESSMENT & PLAN NOTE
NAZIA is likely due to pre-renal azotemia due to dehydration. Baseline creatinine is  1.2 . Most recent creatinine and eGFR are listed below.  Recent Labs     08/09/24  1151 08/10/24  0936   CREATININE 2.0* 1.4   EGFRNORACEVR 39.9* >60.0        Plan  - NAZIA is improving. Will continue current treatment  - Avoid nephrotoxins and renally dose meds for GFR listed above  - Monitor urine output, serial CMP, and adjust therapy as needed

## 2024-08-10 NOTE — CONSULTS
"GASTROENTEROLOGY INPATIENT CONSULT NOTE  Patient Name: Jacoby Jean-Baptiste  Patient MRN: [id number]  Patient : 1975    Admit Date: 2024  Service date: 8/10/2024    Reason for Consult: colitis, diarrhea    PCP: Hunter Aguilar III, MD    Chief Complaint   Patient presents with    Vomiting    Diarrhea     X 1 week       HPI: Patient is a 49 y.o. male with PMHx  refractory ulcerative colitis on biologic admitted with vomiting and diarrhea for over a week.  Cdiff testing negative.  Started on iv abx this admission for possible cellulitis and left upper extremity abscess.  He reports diarrhea 9 times per day over the past week.  Prior to this he thought his biologic was helping.  Denies abdominal pain currently.     Latest Reference Range & Units 24 10:45 03/15/24 19:02 24 05:35 24 16:29 24 12:25   Calprotectin 0 - 120 ug/g 3070 (H) >8000 (H) 2850 (H) 2480 (H) 1100 (H)       Latest Reference Range & Units 24 14:39   C. diff Antigen Negative  Negative   C difficile Toxins A+B, EIA Negative  Negative      (H): Data is abnormally high    Lab Results   Component Value Date    WBC 13.75 (H) 08/10/2024    HGB 12.8 (L) 08/10/2024    HCT 40.8 08/10/2024    MCV 97 08/10/2024     08/10/2024       No results found for: "INR", "PROTIME"  Lab Results   Component Value Date    ALT 5 (L) 08/10/2024    AST 11 08/10/2024    ALKPHOS 65 08/10/2024    BILITOT 0.3 08/10/2024     BMP  Lab Results   Component Value Date     (L) 08/10/2024    K 4.2 08/10/2024     08/10/2024    CO2 17 (L) 08/10/2024    BUN 7 08/10/2024    CREATININE 1.4 08/10/2024    CALCIUM 7.9 (L) 08/10/2024    ANIONGAP 10 08/10/2024    EGFRNORACEVR >60.0 08/10/2024     Lab Results   Component Value Date    CRP 1.80 (H) 07/15/2024          CT abd   Impression:     Redemonstration of findings suggesting a nonspecific proctocolitis including infectious or inflammatory etiologies.  Overall extent appears mildly increased " in length/extent compared to prior examination of 08/04/2024.  No evidence of abscess or free intraperitoneal air.     Stable appearing 0.4 cm calcification within the proximal right ureter without evidence of hydronephrosis.  Nonobstructing left-sided nephrolithiasis.     Additional stable findings as above.    Colon 7/24 - improved colitis with pseudopolyps, scarred mucosa, decreased vascular pattern in rectum to sigmoid colon        Past Medical History:  Past Medical History:   Diagnosis Date    C. difficile colitis     Cavitary pneumonia 11/2023    pos aspergillus serology    Diabetes mellitus 01/2022    Hyperlipidemia 2015    Hypertension 2015    Insomnia 2015    Ulcerative colitis         Past Surgical History:  Past Surgical History:   Procedure Laterality Date    BRONCHOSCOPY WITH FLUOROSCOPY Left 11/20/2023    Procedure: BRONCHOSCOPY, WITH FLUOROSCOPY;  Surgeon: Lisa Villarreal MD;  Location: [facility];  Service: Pulmonary;  Laterality: Left;    BRONCHOSCOPY WITH FLUOROSCOPY N/A 11/30/2023    Procedure: BRONCHOSCOPY, WITH FLUOROSCOPY;  Surgeon: Lisa Villarreal MD;  Location: [facility];  Service: Pulmonary;  Laterality: N/A;    CARDIAC ELECTROPHYSIOLOGY MAPPING AND ABLATION  2017    SVT    CHOLECYSTECTOMY  2011    COLONOSCOPY N/A 5/3/2022    Procedure: COLONOSCOPY;  Surgeon: Shan Jean III, MD;  Location: [facility];  Service: Endoscopy;  Laterality: N/A;    COLONOSCOPY N/A 3/16/2024    Procedure: COLONOSCOPY;  Surgeon: ALEKSANDER Ramos MD;  Location: [facility];  Service: Endoscopy;  Laterality: N/A;    COLONOSCOPY WITH FECAL MICROBIOTA TRANSFER N/A 3/21/2023    Procedure: COLONOSCOPY, WITH FECAL MICROBIOTA TRANSFER;  Surgeon: David Millan MD;  Location: [facility];  Service: Endoscopy;  Laterality: N/A;    ESOPHAGOGASTRODUODENOSCOPY N/A 4/24/2023    Procedure: EGD (ESOPHAGOGASTRODUODENOSCOPY);  Surgeon: Shan Jean III, MD;  Location: [facility];  Service: Endoscopy;   Laterality: N/A;    ESOPHAGOGASTRODUODENOSCOPY N/A 6/5/2024    Procedure: EGD (ESOPHAGOGASTRODUODENOSCOPY);  Surgeon: Odalis Mccabe MD;  Location: [facility] END[facility];  Service: Endoscopy;  Laterality: N/A;    FLEXIBLE SIGMOIDOSCOPY N/A 6/5/2024    Procedure: SIGMOIDOSCOPY, FLEXIBLE;  Surgeon: Odalis Mccabe MD;  Location: [facility] END[facility];  Service: Endoscopy;  Laterality: N/A;    FLEXIBLE SIGMOIDOSCOPY N/A 7/16/2024    Procedure: SIGMOIDOSCOPY, FLEXIBLE;  Surgeon: Shan Jean III, MD;  Location: [facility] END[facility];  Service: Endoscopy;  Laterality: N/A;    GANGLION CYST EXCISION Left 1992    UMBILICAL HERNIA REPAIR  2011    with mesh        Home Medications:  Medications Prior to Admission   Medication Sig Dispense Refill Last Dose    budesonide (ENTOCORT EC) 3 mg capsule Take 6 mg by mouth once daily.   8/9/2024    busPIRone (BUSPAR) 10 MG tablet Take 1 tablet (10 mg total) by mouth 3 (three) times daily. 270 tablet 1 8/9/2024    cholestyramine (QUESTRAN) 4 gram packet Take 1 packet by mouth once daily.   8/9/2024    ergocalciferol (VITAMIN D2) 50,000 unit Cap Take 1 capsule (50,000 Units total) by mouth every 7 days. 12 capsule 1 8/9/2024    hyoscyamine (LEVBID) 0.375 mg Tb12 Take 375 mcg by mouth 2 (two) times daily.   8/9/2024    LORazepam (ATIVAN) 0.5 MG tablet Take 1 tablet (0.5 mg total) by mouth every 12 (twelve) hours as needed for Anxiety. 60 tablet 2 8/8/2024    metFORMIN (GLUCOPHAGE-XR) 500 MG ER 24hr tablet Take 1 tablet (500 mg total) by mouth 2 (two) times daily with meals. 180 tablet 1 8/9/2024    metoprolol succinate (TOPROL-XL) 50 MG 24 hr tablet Take 1 tablet (50 mg total) by mouth once daily. 90 tablet 1 8/9/2024    promethazine (PHENERGAN) 12.5 MG Tab Take 12.5 mg by mouth 4 (four) times daily as needed.   8/9/2024    sertraline (ZOLOFT) 100 MG tablet Take 1 tablet (100 mg total) by mouth once daily. 90 tablet 1 8/9/2024    simvastatin (ZOCOR) 20 MG tablet Take 1 tablet (20 mg total) by mouth every  evening. (Patient taking differently: Take 20 mg by mouth once daily.) 90 tablet 1 8/9/2024    sulfamethoxazole-trimethoprim 800-160mg (BACTRIM DS) 800-160 mg Tab Take 1 tablet by mouth 2 (two) times daily.   8/9/2024    zolpidem (AMBIEN) 10 mg Tab Take 1 tablet (10 mg total) by mouth nightly as needed (insomnia). 30 tablet 2 8/8/2024    levoFLOXacin (LEVAQUIN) 750 MG tablet Take 1 tablet (750 mg total) by mouth once daily. for 7 days 7 tablet 0 Unknown    lipase-protease-amylase 12,000-38,000-60,000 units (CREON) CpDR Take 3 capsules by mouth 3 (three) times daily with meals. 270 capsule 11 Unknown    metroNIDAZOLE (FLAGYL) 500 MG tablet Take 1 tablet (500 mg total) by mouth 3 (three) times daily. for 7 days 21 tablet 0 Unknown    semaglutide (OZEMPIC) 2 mg/dose (8 mg/3 mL) PnIj Inject 2 mg into the skin every 7 days. (Patient taking differently: Inject 2 mg into the skin every 7 days. mondays) 3 mL 5 8/5/2024       Inpatient Medications:   atorvastatin  10 mg Oral Daily    busPIRone  10 mg Oral TID    cholestyramine-aspartame  1 packet Oral BID    dexAMETHasone  12 mg Oral Daily    lipase-protease-amylase 12,000-38,000-60,000 units  3 capsule Oral TID WM    metoprolol succinate  50 mg Oral Daily    sertraline  100 mg Oral Daily    [START ON 8/11/2024] vancomycin (VANCOCIN) IV (PEDS and ADULTS)  2,000 mg Intravenous Q24H       Current Facility-Administered Medications:     acetaminophen, 650 mg, Oral, Q8H PRN    acetaminophen, 650 mg, Oral, Q4H PRN    aluminum-magnesium hydroxide-simethicone, 30 mL, Oral, QID PRN    dextrose 50%, 12.5 g, Intravenous, PRN    dextrose 50%, 25 g, Intravenous, PRN    glucagon (human recombinant), 1 mg, Intramuscular, PRN    glucose, 16 g, Oral, PRN    glucose, 24 g, Oral, PRN    HYDROcodone-acetaminophen, 1 tablet, Oral, Q4H PRN    HYDROcodone-acetaminophen, 1 tablet, Oral, Q4H PRN    hyoscyamine, 0.125 mg, Sublingual, Q4H PRN    insulin aspart U-100, 0-5 Units, Subcutaneous, QID (AC +  HS) PRN    LORazepam, 0.5 mg, Oral, Q12H PRN    magnesium oxide, 800 mg, Oral, PRN    magnesium oxide, 800 mg, Oral, PRN    melatonin, 6 mg, Oral, Nightly PRN    morphine, 2 mg, Intravenous, Q4H PRN    naloxone, 0.02 mg, Intravenous, PRN    ondansetron, 4 mg, Intravenous, Q6H PRN    potassium bicarbonate, 35 mEq, Oral, PRN    potassium bicarbonate, 50 mEq, Oral, PRN    potassium bicarbonate, 60 mEq, Oral, PRN    potassium, sodium phosphates, 2 packet, Oral, PRN    potassium, sodium phosphates, 2 packet, Oral, PRN    potassium, sodium phosphates, 2 packet, Oral, PRN    sodium chloride 0.9%, 2 mL, Intravenous, PRN    Pharmacy to dose Vancomycin consult, , , Once **AND** vancomycin - pharmacy to dose, , Intravenous, pharmacy to manage frequency    zolpidem, 10 mg, Oral, Nightly PRN    Review of patient's allergies indicates:  No Known Allergies    Social History:   Social History     Occupational History    Not on file   Tobacco Use    Smoking status: Former     Current packs/day: 1.00     Average packs/day: 1 pack/day for 31.6 years (31.6 ttl pk-yrs)     Types: Cigarettes     Start date: 1993    Smokeless tobacco: Never    Tobacco comments:     Pt states he has stopped smoking with Chantix three weeks ago. 12/1/23   Substance and Sexual Activity    Alcohol use: Yes     Comment: ocass    Drug use: Not Currently    Sexual activity: Not Currently       Family History:   Family History   Problem Relation Name Age of Onset    Asthma Mother         Review of Systems:  A 10 point review of systems was performed and was normal, except as mentioned in the HPI, including constitutional, HEENT, heme, lymph, cardiovascular, respiratory, gastrointestinal, genitourinary, neurologic, endocrine, psychiatric and musculoskeletal.      OBJECTIVE:    Physical Exam:  24 Hour Vital Sign Ranges: Temp:  [97.5 °F (36.4 °C)-98 °F (36.7 °C)] 97.7 °F (36.5 °C)  Pulse:  [71-84] 72  Resp:  [15-18] 18  SpO2:  [95 %-100 %] 97 %  BP:  "(108-145)/(60-83) 132/68  Most recent vitals: /68   Pulse 72   Temp 97.7 °F (36.5 °C) (Oral)   Resp 18   Ht 5' 5" (1.651 m)   Wt 121.2 kg (267 lb 3.2 oz)   SpO2 97%   BMI 44.46 kg/m²    GEN: obese, nad, left upper extremity fluctuation  HEENT: PERRL, sclera anicteric, oral mucosa pink and moist without lesion  NECK: trachea midline; Good ROM  CV: regular rate and rhythm, no murmurs or gallops  RESP: clear to auscultation bilaterally, no wheezes, rhonci or rales  ABD: soft, non-tender, non-distended, normal bowel sounds  EXT: no swelling or edema, 2+ pulses distally  SKIN: no rashes or jaundice  PSYCH: normal affect    Labs:   Recent Labs     08/09/24  1151 08/10/24  1156   WBC 17.28* 13.75*   MCV 94 97    263     Recent Labs     08/09/24  1151 08/10/24  0936   * 133*   K 4.0 4.2    106   CO2 20* 17*   BUN 10 7   GLU 82 54*     No results for input(s): "ALB" in the last 72 hours.    Invalid input(s): "ALKP", "SGOT", "SGPT", "TBIL", "DBIL", "TPRO"  No results for input(s): "PT", "INR", "PTT" in the last 72 hours.      Radiology Review:  US Soft Tissue Upper Extremity, Left   Final Result      As above.         Electronically signed by: Froylan Cardozo   Date:    08/10/2024   Time:    11:59      CT Abdomen Pelvis With IV Contrast NO Oral Contrast   Final Result      Redemonstration of findings suggesting a nonspecific proctocolitis including infectious or inflammatory etiologies.  Overall extent appears mildly increased in length/extent compared to prior examination of 08/04/2024.  No evidence of abscess or free intraperitoneal air.      Stable appearing 0.4 cm calcification within the proximal right ureter without evidence of hydronephrosis.  Nonobstructing left-sided nephrolithiasis.      Additional stable findings as above.         Electronically signed by: Kirk Dooley MD   Date:    08/09/2024   Time:    19:21      X-Ray Chest AP Portable   Final Result      No acute cardiopulmonary " disease.         Electronically signed by: Kelton Wells   Date:    08/09/2024   Time:    14:12            IMPRESSION / RECOMMENDATIONS:  49 M with refractory ulcerative colitis involving primarily the rectum to left colon on omvoq and initial good response now with persistent symptoms  -at this point will not add any additional immunosuppressants to patient's regimen given ongoing cellulitis with possible left upper extremity abscess.  We will plan for possible repeat endoscopic evaluation Tuesday with Dr Jean.    Thank you for this consult.    Enoc Kebede  8/10/2024  2:39 PM

## 2024-08-10 NOTE — H&P
[facility] - Emergency Dept  Hospital Medicine  History & Physical    Patient Name: Jacoby Jean-Baptiste  MRN: [id number]  Patient Class: OP- Observation  Admission Date: 8/9/2024  Attending Physician: Cele Moffett MD   Primary Care Provider: Hunter Aguilar III, MD         Patient information was obtained from patient and ER records.     Subjective:     Principal Problem:Sepsis    Chief Complaint:   Chief Complaint   Patient presents with    Vomiting    Diarrhea     X 1 week        HPI: 50 y/o male with pMHx of 2 DM, C difficile colitis, ulcerative colitis, cavitary pneumonia, HLD, and hypertension who presented to the ED with complaints of vomiting and diarrhea x1 week.  Patient was seen in the ED on 08/05/2024 with similar symptoms and went home to take his Levaquin and Flagyl that was previously prescribed and did not fill the prescriptions.  Today he made decision to return to the ED with worsening symptoms.  Patient is also with left upper arm site of previous I and D of abscess on oral Bactrim.  We will stop Bactrim and consult wound care for topical treatment as patient is on systemic antibiotics with no change.  On arrival patient with a WBCs 17.2, sodium 134, CO2 20, creatinine 2.0, lactic 1.69.  CTA abdomen and pelvis with IV contrast redemonstration of findings suggesting a nonspecific proctocolitis.  Patient was given 3 L of fluid in the ED he was tested for C difficile and found to be negative for antigens and toxins.  Patient will be admitted for IV antibiotic treatment.    Past Medical History:   Diagnosis Date    C. difficile colitis     Cavitary pneumonia 11/2023    pos aspergillus serology    Diabetes mellitus 01/2022    Hyperlipidemia 2015    Hypertension 2015    Insomnia 2015    Ulcerative colitis        Past Surgical History:   Procedure Laterality Date    BRONCHOSCOPY WITH FLUOROSCOPY Left 11/20/2023    Procedure: BRONCHOSCOPY, WITH FLUOROSCOPY;  Surgeon: Lisa Villarreal MD;   Location: [facility] ENDO;  Service: Pulmonary;  Laterality: Left;    BRONCHOSCOPY WITH FLUOROSCOPY N/A 11/30/2023    Procedure: BRONCHOSCOPY, WITH FLUOROSCOPY;  Surgeon: Lisa Villarreal MD;  Location: [facility];  Service: Pulmonary;  Laterality: N/A;    CARDIAC ELECTROPHYSIOLOGY MAPPING AND ABLATION  2017    SVT    CHOLECYSTECTOMY  2011    COLONOSCOPY N/A 5/3/2022    Procedure: COLONOSCOPY;  Surgeon: Shan Jean III, MD;  Location: [facility];  Service: Endoscopy;  Laterality: N/A;    COLONOSCOPY N/A 3/16/2024    Procedure: COLONOSCOPY;  Surgeon: ALEKSANDER Ramos MD;  Location: [facility];  Service: Endoscopy;  Laterality: N/A;    COLONOSCOPY WITH FECAL MICROBIOTA TRANSFER N/A 3/21/2023    Procedure: COLONOSCOPY, WITH FECAL MICROBIOTA TRANSFER;  Surgeon: David Millan MD;  Location: [facility];  Service: Endoscopy;  Laterality: N/A;    ESOPHAGOGASTRODUODENOSCOPY N/A 4/24/2023    Procedure: EGD (ESOPHAGOGASTRODUODENOSCOPY);  Surgeon: Shan Jean III, MD;  Location: [facility];  Service: Endoscopy;  Laterality: N/A;    ESOPHAGOGASTRODUODENOSCOPY N/A 6/5/2024    Procedure: EGD (ESOPHAGOGASTRODUODENOSCOPY);  Surgeon: Odalis Mccabe MD;  Location: [facility];  Service: Endoscopy;  Laterality: N/A;    FLEXIBLE SIGMOIDOSCOPY N/A 6/5/2024    Procedure: SIGMOIDOSCOPY, FLEXIBLE;  Surgeon: Odalis Mccabe MD;  Location: [facility];  Service: Endoscopy;  Laterality: N/A;    FLEXIBLE SIGMOIDOSCOPY N/A 7/16/2024    Procedure: SIGMOIDOSCOPY, FLEXIBLE;  Surgeon: Shan Jean III, MD;  Location: [facility];  Service: Endoscopy;  Laterality: N/A;    GANGLION CYST EXCISION Left 1992    UMBILICAL HERNIA REPAIR  2011    with mesh       Review of patient's allergies indicates:  No Known Allergies    Current Facility-Administered Medications on File Prior to Encounter   Medication    lactated ringers infusion     Current Outpatient Medications on File Prior to Encounter   Medication Sig    budesonide  (ENTOCORT EC) 3 mg capsule Take 6 mg by mouth once daily.    busPIRone (BUSPAR) 10 MG tablet Take 1 tablet (10 mg total) by mouth 3 (three) times daily.    cholestyramine (QUESTRAN) 4 gram packet Take 1 packet by mouth once daily.    ergocalciferol (VITAMIN D2) 50,000 unit Cap Take 1 capsule (50,000 Units total) by mouth every 7 days.    hyoscyamine (LEVBID) 0.375 mg Tb12 Take 375 mcg by mouth 2 (two) times daily.    LORazepam (ATIVAN) 0.5 MG tablet Take 1 tablet (0.5 mg total) by mouth every 12 (twelve) hours as needed for Anxiety.    metFORMIN (GLUCOPHAGE-XR) 500 MG ER 24hr tablet Take 1 tablet (500 mg total) by mouth 2 (two) times daily with meals.    metoprolol succinate (TOPROL-XL) 50 MG 24 hr tablet Take 1 tablet (50 mg total) by mouth once daily.    promethazine (PHENERGAN) 12.5 MG Tab Take 12.5 mg by mouth 4 (four) times daily as needed.    sertraline (ZOLOFT) 100 MG tablet Take 1 tablet (100 mg total) by mouth once daily.    simvastatin (ZOCOR) 20 MG tablet Take 1 tablet (20 mg total) by mouth every evening. (Patient taking differently: Take 20 mg by mouth once daily.)    sulfamethoxazole-trimethoprim 800-160mg (BACTRIM DS) 800-160 mg Tab Take 1 tablet by mouth 2 (two) times daily.    zolpidem (AMBIEN) 10 mg Tab Take 1 tablet (10 mg total) by mouth nightly as needed (insomnia).    levoFLOXacin (LEVAQUIN) 750 MG tablet Take 1 tablet (750 mg total) by mouth once daily. for 7 days    lipase-protease-amylase 12,000-38,000-60,000 units (CREON) CpDR Take 3 capsules by mouth 3 (three) times daily with meals.    metroNIDAZOLE (FLAGYL) 500 MG tablet Take 1 tablet (500 mg total) by mouth 3 (three) times daily. for 7 days    oxyCODONE-acetaminophen (PERCOCET) 5-325 mg per tablet Take 1 tablet by mouth every 6 (six) hours as needed for Pain.    rifAXIMin (XIFAXAN) 550 mg Tab Take 1 tablet (550 mg total) by mouth 3 (three) times daily.    semaglutide (OZEMPIC) 2 mg/dose (8 mg/3 mL) PnIj Inject 2 mg into the skin every 7  days. (Patient taking differently: Inject 2 mg into the skin every 7 days. mondays)    traMADoL (ULTRAM) 50 mg tablet Take 50 mg by mouth every 6 (six) hours as needed.    triamcinolone acetonide 0.1% (KENALOG) 0.1 % cream Apply topically 2 (two) times daily. (Patient taking differently: Apply 1 g topically 2 (two) times daily.)     Family History       Problem Relation (Age of Onset)    Asthma Mother          Tobacco Use    Smoking status: Former     Current packs/day: 1.00     Average packs/day: 1 pack/day for 31.6 years (31.6 ttl pk-yrs)     Types: Cigarettes     Start date: 1993    Smokeless tobacco: Never    Tobacco comments:     Pt states he has stopped smoking with Chantix three weeks ago. 12/1/23   Substance and Sexual Activity    Alcohol use: Yes     Comment: ocass    Drug use: Not Currently    Sexual activity: Not Currently     Review of Systems   Gastrointestinal:  Positive for diarrhea and nausea.     Objective:     Vital Signs (Most Recent):  Temp: 98.2 °F (36.8 °C) (08/09/24 1110)  Pulse: 72 (08/09/24 1805)  Resp: 18 (08/09/24 1929)  BP: 136/83 (08/09/24 1455)  SpO2: 95 % (08/09/24 1805) Vital Signs (24h Range):  Temp:  [98.2 °F (36.8 °C)] 98.2 °F (36.8 °C)  Pulse:  [72-97] 72  Resp:  [18-20] 18  SpO2:  [95 %-99 %] 95 %  BP: (136-142)/(83-93) 136/83     Weight: 119.7 kg (264 lb)  Body mass index is 43.93 kg/m².     Physical Exam  Vitals reviewed.   Constitutional:       General: He is awake.      Appearance: Normal appearance. He is obese.   HENT:      Head: Normocephalic and atraumatic.      Nose: No congestion.      Mouth/Throat:      Mouth: Mucous membranes are moist.      Pharynx: Oropharynx is clear.   Eyes:      Extraocular Movements: Extraocular movements intact.      Pupils: Pupils are equal, round, and reactive to light.   Cardiovascular:      Rate and Rhythm: Normal rate and regular rhythm.      Pulses: Normal pulses.      Heart sounds: Normal heart sounds.   Pulmonary:      Effort: Pulmonary  effort is normal.      Breath sounds: Normal breath sounds.   Abdominal:      General: Bowel sounds are normal.      Palpations: Abdomen is soft.   Musculoskeletal:         General: Normal range of motion.      Cervical back: Normal range of motion and neck supple.   Skin:     General: Skin is warm and dry.      Capillary Refill: Capillary refill takes less than 2 seconds.      Findings: Wound present.             Comments: I&D of abscess   Neurological:      General: No focal deficit present.      Mental Status: He is alert and oriented to person, place, and time. Mental status is at baseline.   Psychiatric:         Mood and Affect: Mood normal.         Behavior: Behavior normal. Behavior is cooperative.         Judgment: Judgment normal.              CRANIAL NERVES     CN III, IV, VI   Pupils are equal, round, and reactive to light.       Significant Labs: All pertinent labs within the past 24 hours have been reviewed.  Recent Lab Results         08/09/24  1439   08/09/24  1435   08/09/24  1151        Albumin     3.6       ALP     77       ALT     6       Anion Gap     11       Appearance, UA Clear           AST     11       Bacteria, UA Rare           Baso #     0.10       Basophil %     0.6       Bilirubin (UA) Negative           BILIRUBIN TOTAL     0.3  Comment: For infants and newborns, interpretation of results should be based  on gestational age, weight and in agreement with clinical  observations.    Premature Infant recommended reference ranges:  Up to 24 hours.............<8.0 mg/dL  Up to 48 hours............<12.0 mg/dL  3-5 days..................<15.0 mg/dL  6-29 days.................<15.0 mg/dL         BUN     10       C difficile Toxins A+B, EIA Negative  Comment: Testing not recommended for children <24 months old.           C. diff Antigen Negative           Calcium     8.8       Chloride     103       CO2     20       Color, UA Yellow           Creatinine     2.0       Differential Method      Automated       eGFR     39.9       Eos #     0.3       Eos %     1.6       Glucose     82       Glucose, UA Negative           Gran # (ANC)     13.5       Gran %     78.0       Hematocrit     43.2       Hemoglobin     14.3       Hyaline Casts,            Immature Grans (Abs)     0.18  Comment: Mild elevation in immature granulocytes is non specific and   can be seen in a variety of conditions including stress response,   acute inflammation, trauma and pregnancy. Correlation with other   laboratory and clinical findings is essential.         Immature Granulocytes     1.0       Ketones, UA Negative           Leukocyte Esterase, UA 1+           Lipase     4       Lymph #     1.9       Lymph %     11.2       MCH     31.0       MCHC     33.1       MCV     94       Microscopic Comment SEE COMMENT  Comment: Other formed elements not mentioned in the report are not   present in the microscopic examination.              Mono #     1.3       Mono %     7.6       MPV     10.1       NITRITE UA Negative           nRBC     0       Blood, UA Negative           pH, UA 6.0           Platelet Count     296       POC Lactate   1.69         Potassium     4.0       PROTEIN TOTAL     7.3       Protein, UA Trace  Comment: Recommend a 24 hour urine protein or a urine   protein/creatinine ratio if globulin induced proteinuria is  clinically suspected.             RBC     4.61       RBC, UA 1           RDW     14.6       Sample   VENOUS         Sodium     134       Spec Grav UA 1.020           Specimen UA Urine, Clean Catch           Squam Epithel, UA 1           Troponin I High Sensitivity     6.0  Comment: Troponin results differ between methods. Do not use   results between Troponin methods interchangeably.    Alkaline Phospatase levels above 400 U/L may   cause false positive results.    Access hsTnI should not be used for patients taking   Asfotase erika (Strensiq).         UROBILINOGEN UA Negative           WBC, UA 4            WBC     17.28               Significant Imaging: I have reviewed all pertinent imaging results/findings within the past 24 hours.    CT Abdomen Pelvis With IV Contrast NO Oral Contrast  Narrative: EXAMINATION:  CT ABDOMEN PELVIS WITH IV CONTRAST    CLINICAL HISTORY:  LLQ abdominal pain;    TECHNIQUE:  Low dose axial images, sagittal and coronal reformations were obtained from the lung bases to the pubic symphysis following the IV administration of 100 mL of Omnipaque 350 .  Oral contrast was not given.    COMPARISON:  08/04/2024    FINDINGS:  The lung bases are unremarkable.  There is no pleural fluid present.  The visualized portions of the heart appear normal.    There is decreased attenuation of the hepatic parenchyma suggesting hepatic steatosis.  The portal vein is patent.  The gallbladder is surgically absent.  There is no intra-or extrahepatic biliary ductal dilatation.    The stomach, spleen, pancreas, and adrenal glands are unremarkable.    Kidneys are normal in size and location enhance symmetrically.  There is a stable appearing 0.4 cm calcification within the proximal right ureter without significant hydronephrosis.  There is nonobstructing left-sided nephrolithiasis.  Urinary bladder appears within normal limits.  Prostate is unremarkable.  No significant free fluid in the pelvis.    The abdominal aorta is normal in course and caliber with mild atherosclerotic calcification along its course.  No retroperitoneal hematoma.    No evidence of small-bowel obstruction.  The terminal ileum is unremarkable.  Redemonstration of abnormal long segment proctocolitis extending from the rectum to the hepatic flexure colon.  This appears mildly increased in overall length/extent compared to prior examination.  No evidence of abscess or free intraperitoneal air.  Appendix is not definitively visualized.  There are scattered shotty small mesenteric and retroperitoneal lymph nodes.    Osseous structures demonstrate mild  degenerative changes.  The extraperitoneal soft tissues are unremarkable.  Impression: Redemonstration of findings suggesting a nonspecific proctocolitis including infectious or inflammatory etiologies.  Overall extent appears mildly increased in length/extent compared to prior examination of 08/04/2024.  No evidence of abscess or free intraperitoneal air.    Stable appearing 0.4 cm calcification within the proximal right ureter without evidence of hydronephrosis.  Nonobstructing left-sided nephrolithiasis.    Additional stable findings as above.    Electronically signed by: Kirk Dooley MD  Date:    08/09/2024  Time:    19:21  X-Ray Chest AP Portable  Narrative: EXAMINATION:  XR CHEST AP PORTABLE    CLINICAL HISTORY:  Sepsis;    COMPARISON:  07/28/2024    FINDINGS:  The patient is rotated.    Cardiac size is within normal limits.  Central pulmonary vasculature is not acutely engorged.    No confluent infiltrate or significant volume loss is observed.  No effusion is demonstrated.  There is mild apical pleural thickening bilaterally.  Impression: No acute cardiopulmonary disease.    Electronically signed by: Kelton Wells  Date:    08/09/2024  Time:    14:12      Assessment/Plan:     * Sepsis  C diff checked was negative  This patient does have evidence of infective focus  My overall impression is sepsis.  Source: Abdominal and Skin and Soft Tissue (location left upper arm)  Antibiotics given-   Antibiotics (72h ago, onward)      Start     Stop Route Frequency Ordered    08/09/24 2145  levoFLOXacin 750 mg/150 mL IVPB 750 mg         -- IV Every 24 hours (non-standard times) 08/09/24 2038 08/09/24 2145  metronidazole IVPB 500 mg         -- IV Every 8 hours (non-standard times) 08/09/24 2038          Latest lactate reviewed-  Recent Labs   Lab 08/09/24  1435   POCLAC 1.69     Organ dysfunction indicated by Acute kidney injury    Fluid challenge Ideal Body Weight- The patient's ideal body weight is Ideal body  "weight: 61.5 kg (135 lb 9.3 oz) which will be used to calculate fluid bolus of 30 ml/kg for treatment of septic shock.  Lactic acid 1.69 patient given 3 L of fluid in ED    Post- resuscitation assessment No - Post resuscitation assessment not needed     Source control achieved by:  3 L IV fluid, antibiotics IV    Proctocolitis  Patient is started on IV Levaquin and IV Flagyl  Use dexamethasone 12 mg daily in place of budesonide 6 mg while in-house        Skin abscess  Stopped p.o. Bactrim  Keep site clean  Consult wound care nurse      Acute renal failure  NAZIA is likely due to pre-renal azotemia due to dehydration. Baseline creatinine is  1.2 . Most recent creatinine and eGFR are listed below.  Recent Labs     08/09/24  1151   CREATININE 2.0*   EGFRNORACEVR 39.9*      Plan  - NAZIA is worsening. Will continue current treatment  - Avoid nephrotoxins and renally dose meds for GFR listed above  - Monitor urine output, serial CMP, and adjust therapy as needed      Type 2 diabetes mellitus without complication, without long-term current use of insulin  Patient's FSGs are controlled on current medication regimen.  Last A1c reviewed-   Lab Results   Component Value Date    HGBA1C 5.2 06/06/2024     Most recent fingerstick glucose reviewed- No results for input(s): "POCTGLUCOSE" in the last 24 hours.  Current correctional scale  Low  Maintain anti-hyperglycemic dose as follows-   Antihyperglycemics (From admission, onward)      Start     Stop Route Frequency Ordered    08/09/24 2137  insulin aspart U-100 pen 0-5 Units         -- SubQ Before meals & nightly PRN 08/09/24 2038          Hold Oral hypoglycemics while patient is in the hospital.  Hold metformin, Ozempic-- start low dose SSI    Morbid obesity  Body mass index is 43.93 kg/m². Morbid obesity complicates all aspects of disease management from diagnostic modalities to treatment. Weight loss encouraged and health benefits explained to patient.           VTE Risk " Mitigation (From admission, onward)           Ordered     Reason for No Pharmacological VTE Prophylaxis  Once        Question:  Reasons:  Answer:  Risk of Bleeding  Comment:  ulcerative colitis patient    08/09/24 2038     IP VTE HIGH RISK PATIENT  Once         08/09/24 2038     Place sequential compression device  Until discontinued         08/09/24 2038                         On 08/09/2024, patient should be placed in hospital observation services under my care in collaboration with [facility].           Karoline Gotti NP  Department of Hospital Medicine  [facility] - Emergency Dept

## 2024-08-10 NOTE — PLAN OF CARE
[facility]  Initial Discharge Assessment    Assessment completed at bedside with Pt, and all information on FaceSheet confirmed, including demographics, PCP, pharmacy and insurance. Pt  has addressed advance directives and Estiven (Sister-In-Law)Jessica (Sister) [phone] (Mobile) is POA. He currently lives in [place] by himself. His PCP is Hunter rodney MD, and last appointment was 8/1/24. His preferred pharmacy is [email or web address] in [place]. He denies any DME/HH/HD/Blood Thinners. Pt will transport himself back home after discharge. He had a recent hospitalization and readmission is complete. Plan for discharge is to return home. Case Management to continue to follow for discharge planning needs.        Primary Care Provider: Hunter Rodney III, MD    Admission Diagnosis: Sepsis [A41.9]  Proctocolitis [K52.9]    Admission Date: 8/9/2024  Expected Discharge Date:     Transition of Care Barriers: None    Payor: [employer] / Plan: [employer] ([employer]) / Product Type: Commercial /     Extended Emergency Contact Information  Primary Emergency Contact: Estiven (Sister-In-Law)Jessica  Address: [address] [place] [address] [facility]  Home Phone: [phone]  Mobile Phone: [phone]  Relation: Sister  Preferred language: English   needed? No  Secondary Emergency Contact: Dallas Jean-Baptiste   [place]  Mobile Phone: [phone]  Relation: Brother  Preferred language: English   needed? No    Discharge Plan A: Home with family  Discharge Plan B: Home      [id number] - [place], [place] - [address] AT [facility] [address] [facility] [address]  [address]  [place] [address]  Phone: [phone] Fax: [phone]    [facility] - [place], [place] - [address]   [address] [facility] [address]  Phone: [phone] Fax: [phone]    [facility] [address]  [address] [facility] [address]  [facility] [address]  Phone: [phone] Fax: [phone]      Initial Assessment (most recent)       Adult Discharge Assessment - 08/10/24 1121          Discharge Assessment    Assessment Type Discharge Planning Assessment     Confirmed/corrected address, phone number and insurance Yes     Confirmed Demographics Correct on Facesheet     Source of Information patient     When was your last doctors appointment? 08/01/24     Does patient/caregiver understand observation status Yes     Reason For Admission Sepsis     People in Home alone     Do you expect to return to your current living situation? Yes     Do you have help at home or someone to help you manage your care at home? Yes     Who are your caregiver(s) and their phone number(s)? Estiven (Sister-In-Law),Jesisca (Sister)  [phone] (Mobile)     Prior to hospitilization cognitive status: Alert/Oriented     Current cognitive status: Alert/Oriented     Walking or Climbing Stairs Difficulty no     Dressing/Bathing Difficulty no     Home Accessibility not wheelchair accessible     Home Layout Able to live on 1st floor     Equipment Currently Used at Home none     Readmission within 30 days? Yes     Patient currently being followed by outpatient case management? No     Do you currently have service(s) that help you manage your care at home? No     Do you take prescription medications? Yes     Do you have prescription coverage? Yes     Coverage [employer] - [employer] ([employer])     Who is going to help you get home at discharge? Estiven (Sister-In-Law),Jessica (Sister)  [phone] (Mobile)     How do you get to doctors appointments? car, drives self     Are you on dialysis? No     Do you take coumadin? No     Discharge Plan A Home with family     Discharge Plan B Home     DME Needed Upon Discharge  none     Discharge Plan discussed with: Patient     Transition of Care Barriers None        Physical Activity    On average, how many  days per week do you engage in moderate to strenuous exercise (like a brisk walk)? 0 days     On average, how many minutes do you engage in exercise at this level? 0 min        Financial Resource Strain    How hard is it for you to pay for the very basics like food, housing, medical care, and heating? Not hard at all        Housing Stability    In the last 12 months, was there a time when you were not able to pay the mortgage or rent on time? No     At any time in the past 12 months, were you homeless or living in a shelter (including now)? No        Transportation Needs    Has the lack of transportation kept you from medical appointments, meetings, work or from getting things needed for daily living? No        Food Insecurity    Within the past 12 months, you worried that your food would run out before you got the money to buy more. Never true     Within the past 12 months, the food you bought just didn't last and you didn't have money to get more. Never true        Stress    Do you feel stress - tense, restless, nervous, or anxious, or unable to sleep at night because your mind is troubled all the time - these days? Not at all        Social Isolation    How often do you feel lonely or isolated from those around you?  Never        Alcohol Use    Q1: How often do you have a drink containing alcohol? Never     Q2: How many drinks containing alcohol do you have on a typical day when you are drinking? Patient does not drink     Q3: How often do you have six or more drinks on one occasion? Never        Utilities    In the past 12 months has the electric, gas, oil, or water company threatened to shut off services in your home? No        Health Literacy    How often do you need to have someone help you when you read instructions, pamphlets, or other written material from your doctor or pharmacy? Never

## 2024-08-10 NOTE — PROGRESS NOTES
Pharmacist Renal Adjustment Note    Jacoby Jean-Baptiste is a 49 y.o. male being treated with Cefepime.      Patient Data:    Vital Signs (Most Recent):  Temp: 97.5 °F (36.4 °C) (08/09/24 2222)  Pulse: 84 (08/09/24 2222)  Resp: 18 (08/09/24 2236)  BP: (!) 145/72 (08/09/24 2222)  SpO2: 96 % (08/09/24 2222) Vital Signs (72h Range):  Temp:  [97.5 °F (36.4 °C)-98.2 °F (36.8 °C)]   Pulse:  [72-97]   Resp:  [18-20]   BP: (118-145)/(60-93)   SpO2:  [95 %-100 %]      Recent Labs   Lab 08/04/24  1652 08/09/24  1151   CREATININE 1.3 2.0*     Serum creatinine: 2 mg/dL (H) 08/09/24 1151  Estimated creatinine clearance: 53.4 mL/min (A)    Cefepime 1 g every 8 hours will be changed to Cefepime 1 g every 12 hours due to CrCl 30-60 per Renal Dose Adjustment protocol.     Pharmacist's Name: Jessica Aguilar  Pharmacist's Extension: [phone]

## 2024-08-10 NOTE — HPI
48 y/o male with pMHx of 2 DM, C difficile colitis, ulcerative colitis, cavitary pneumonia, HLD, and hypertension who presented to the ED with complaints of vomiting and diarrhea x1 week.  Patient was seen in the ED on 08/05/2024 with similar symptoms and went home to take his Levaquin and Flagyl that was previously prescribed and did not fill the prescriptions.  Today he made decision to return to the ED with worsening symptoms.  Patient is also with left upper arm site of previous I and D of abscess on oral Bactrim.  We will stop Bactrim and consult wound care for topical treatment as patient is on systemic antibiotics with no change.  On arrival patient with a WBCs 17.2, sodium 134, CO2 20, creatinine 2.0, lactic 1.69.  CTA abdomen and pelvis with IV contrast redemonstration of findings suggesting a nonspecific proctocolitis.  Patient was given 3 L of fluid in the ED he was tested for C difficile and found to be negative for antigens and toxins.  Patient will be admitted for IV antibiotic treatment.

## 2024-08-10 NOTE — ASSESSMENT & PLAN NOTE
C diff checked was negative  This patient does have evidence of infective focus  My overall impression is sepsis.  Source: Abdominal and Skin and Soft Tissue (location left upper arm)  Antibiotics given-   Antibiotics (72h ago, onward)      Start     Stop Route Frequency Ordered    08/09/24 2145  levoFLOXacin 750 mg/150 mL IVPB 750 mg         -- IV Every 24 hours (non-standard times) 08/09/24 2038 08/09/24 2145  metronidazole IVPB 500 mg         -- IV Every 8 hours (non-standard times) 08/09/24 2038          Latest lactate reviewed-  Recent Labs   Lab 08/09/24  1435   POCLAC 1.69     Organ dysfunction indicated by Acute kidney injury    Fluid challenge Ideal Body Weight- The patient's ideal body weight is Ideal body weight: 61.5 kg (135 lb 9.3 oz) which will be used to calculate fluid bolus of 30 ml/kg for treatment of septic shock.  Lactic acid 1.69 patient given 3 L of fluid in ED    Post- resuscitation assessment No - Post resuscitation assessment not needed     Source control achieved by:  3 L IV fluid, antibiotics IV

## 2024-08-10 NOTE — ASSESSMENT & PLAN NOTE
Flare suspected  Pain control  Antiemetics as needed  Dexamethasone substituted for budesonide acutely  GI consulted

## 2024-08-10 NOTE — PROGRESS NOTES
[facility]  Progress Note    Patient Name: Jacoby Jean-Baptiste  MRN: [id number]  Patient Class: IP- Inpatient   Admission Date: 8/9/2024  Length of Stay: 0 days  Attending Physician: Long Arizmendi MD  Primary Care Provider: Hunter Aguilar III, MD        Subjective:     Principal Problem:Sepsis        HPI:  50 y/o male with pMHx of 2 DM, C difficile colitis, ulcerative colitis, cavitary pneumonia, HLD, and hypertension who presented to the ED with complaints of vomiting and diarrhea x1 week.  Patient was seen in the ED on 08/05/2024 with similar symptoms and went home to take his Levaquin and Flagyl that was previously prescribed and did not fill the prescriptions.  Today he made decision to return to the ED with worsening symptoms.  Patient is also with left upper arm site of previous I and D of abscess on oral Bactrim.  We will stop Bactrim and consult wound care for topical treatment as patient is on systemic antibiotics with no change.  On arrival patient with a WBCs 17.2, sodium 134, CO2 20, creatinine 2.0, lactic 1.69.  CTA abdomen and pelvis with IV contrast redemonstration of findings suggesting a nonspecific proctocolitis.  Patient was given 3 L of fluid in the ED he was tested for C difficile and found to be negative for antigens and toxins.  Patient will be admitted for IV antibiotic treatment.    Overview/Hospital Course:  No notes on file    Interval History: no acute events overnight.  Pain uncontrolled.  Pain regimen adjusted.  Still having some intermittent nausea.    Review of Systems   Constitutional:  Negative for chills and fever.   Gastrointestinal:  Positive for diarrhea and nausea.     Objective:     Vital Signs (Most Recent):  Temp: 97.7 °F (36.5 °C) (08/10/24 1200)  Pulse: 72 (08/10/24 1200)  Resp: 18 (08/10/24 1200)  BP: 132/68 (08/10/24 1200)  SpO2: 97 % (08/10/24 1200) Vital Signs (24h Range):  Temp:  [97.5 °F (36.4 °C)-98 °F (36.7 °C)] 97.7 °F (36.5  °C)  Pulse:  [71-84] 72  Resp:  [15-18] 18  SpO2:  [95 %-100 %] 97 %  BP: (108-145)/(60-85) 132/68     Weight: 121.2 kg (267 lb 3.2 oz)  Body mass index is 44.46 kg/m².    Intake/Output Summary (Last 24 hours) at 8/10/2024 1409  Last data filed at 8/9/2024 2156  Gross per 24 hour   Intake 100 ml   Output --   Net 100 ml         Physical Exam  Vitals reviewed.   Constitutional:       General: He is awake.      Appearance: Normal appearance. He is obese.   HENT:      Head: Normocephalic and atraumatic.      Nose: No congestion.      Mouth/Throat:      Mouth: Mucous membranes are moist.      Pharynx: Oropharynx is clear.   Eyes:      Extraocular Movements: Extraocular movements intact.      Pupils: Pupils are equal, round, and reactive to light.   Cardiovascular:      Rate and Rhythm: Normal rate and regular rhythm.      Pulses: Normal pulses.      Heart sounds: Normal heart sounds.   Pulmonary:      Effort: Pulmonary effort is normal. No respiratory distress.      Breath sounds: Normal breath sounds.   Abdominal:      General: Bowel sounds are normal.      Palpations: Abdomen is soft.      Tenderness: There is abdominal tenderness.   Musculoskeletal:         General: Normal range of motion.      Cervical back: Normal range of motion and neck supple.   Skin:     General: Skin is warm and dry.      Capillary Refill: Capillary refill takes less than 2 seconds.      Findings: Wound present.             Comments: Abscess to LUE s/p I&D; surrounding erythema to site   Neurological:      General: No focal deficit present.      Mental Status: He is alert and oriented to person, place, and time. Mental status is at baseline.   Psychiatric:         Mood and Affect: Mood normal.         Behavior: Behavior normal. Behavior is cooperative.         Judgment: Judgment normal.             Significant Labs: All pertinent labs within the past 24 hours have been reviewed.  CBC:   Recent Labs   Lab 08/09/24  1151 08/10/24  1156   WBC  17.28* 13.75*   HGB 14.3 12.8*   HCT 43.2 40.8    263     CMP:   Recent Labs   Lab 08/09/24  1151 08/10/24  0936   * 133*   K 4.0 4.2    106   CO2 20* 17*   GLU 82 54*   BUN 10 7   CREATININE 2.0* 1.4   CALCIUM 8.8 7.9*   PROT 7.3 6.3   ALBUMIN 3.6 3.2*   BILITOT 0.3 0.3   ALKPHOS 77 65   AST 11 11   ALT 6* 5*   ANIONGAP 11 10       Significant Imaging: I have reviewed all pertinent imaging results/findings within the past 24 hours.  US LUE:  Targeted ultrasound at site of clinical concern of medial left upper extremity, at site of erythema, shows no solid or cystic mass.  Superficial hypoechoic focus measuring up to 6 mm is present near this region, possibly reflecting edema, infected fluid, or scarring.       Assessment/Plan:      * Sepsis  C diff checked was negative  This patient does have evidence of infective focus  My overall impression is sepsis.  Source: Abdominal and Skin and Soft Tissue (location left upper arm)  Antibiotics given-   Antibiotics (72h ago, onward)      Start     Stop Route Frequency Ordered    08/09/24 2145  levoFLOXacin 750 mg/150 mL IVPB 750 mg         -- IV Every 24 hours (non-standard times) 08/09/24 2038 08/09/24 2145  metronidazole IVPB 500 mg         -- IV Every 8 hours (non-standard times) 08/09/24 2038          Latest lactate reviewed-  Recent Labs   Lab 08/09/24  1435   POCLAC 1.69     Organ dysfunction indicated by Acute kidney injury    Fluid challenge Ideal Body Weight- The patient's ideal body weight is Ideal body weight: 61.5 kg (135 lb 9.3 oz) which will be used to calculate fluid bolus of 30 ml/kg for treatment of septic shock.  Lactic acid 1.69 patient given 3 L of fluid in ED    Post- resuscitation assessment No - Post resuscitation assessment not needed     Source control achieved by:  3 L IV fluid, antibiotics IV    Skin abscess  Stopped p.o. Bactrim  Keep site clean  Consult wound care nurse      Acute renal failure  NAZIA is likely due to  "pre-renal azotemia due to dehydration. Baseline creatinine is  1.2 . Most recent creatinine and eGFR are listed below.  Recent Labs     08/09/24  1151 08/10/24  0936   CREATININE 2.0* 1.4   EGFRNORACEVR 39.9* >60.0        Plan  - NAZIA is improving. Will continue current treatment  - Avoid nephrotoxins and renally dose meds for GFR listed above  - Monitor urine output, serial CMP, and adjust therapy as needed      Proctocolitis  Patient is started on IV Levaquin and IV Flagyl  Use dexamethasone 12 mg daily in place of budesonide 6 mg while in-house  UC flare suspected-GI consulted        Type 2 diabetes mellitus without complication, without long-term current use of insulin  Patient's FSGs are controlled on current medication regimen.  Last A1c reviewed-   Lab Results   Component Value Date    HGBA1C 5.2 06/06/2024     Most recent fingerstick glucose reviewed- No results for input(s): "POCTGLUCOSE" in the last 24 hours.  Current correctional scale  Low  Maintain anti-hyperglycemic dose as follows-   Antihyperglycemics (From admission, onward)      Start     Stop Route Frequency Ordered    08/09/24 2137  insulin aspart U-100 pen 0-5 Units         -- SubQ Before meals & nightly PRN 08/09/24 2038          Hold Oral hypoglycemics while patient is in the hospital.  Hold metformin, Ozempic-- start low dose SSI    Morbid obesity  Body mass index is 43.93 kg/m². Morbid obesity complicates all aspects of disease management from diagnostic modalities to treatment. Weight loss encouraged and health benefits explained to patient.         Ulcerative Colitis       Flare suspected       Pain control       Antiemetics as needed       Dexamethasone substituted for budesonide acutely       GI consult.      VTE Risk Mitigation (From admission, onward)           Ordered     Reason for No Pharmacological VTE Prophylaxis  Once        Question:  Reasons:  Answer:  Risk of Bleeding  Comment:  ulcerative colitis patient    08/09/24 2038     " IP VTE HIGH RISK PATIENT  Once         08/09/24 2038     Place sequential compression device  Until discontinued         08/09/24 2038                    Discharge Planning   GERARDO:      Code Status: Full Code   Is the patient medically ready for discharge?:     Reason for patient still in hospital (select all that apply): Patient trending condition, Treatment, and Consult recommendations  Discharge Plan A: Home with family                  JAMIA Saini  Department of Hospital Medicine   [facility]

## 2024-08-10 NOTE — CARE UPDATE
08/10/24 0743   PRE-TX-O2   Device (Oxygen Therapy) room air   SpO2 97 %   $ Pulse Oximetry - Single Charge Pulse Oximetry - Single   Pulse 77   Resp 15   Respiratory Evaluation   $ Care Plan Tech Time 15 min

## 2024-08-10 NOTE — CONSULTS
"[facility]   Department of Infectious Disease  Consult Note        PATIENT NAME: Jacoby Jean-Baptiste  MRN: [id number]  TODAY'S DATE: 08/10/2024  ADMIT DATE: 8/9/2024  LOS: 0 days    CHIEF COMPLAINT: Vomiting and Diarrhea (X 1 week)    PRINCIPLE PROBLEM: Sepsis    REASON FOR CONSULT: Colitis and cellulitis, plz advise on abx     ASSESSMENT and PLAN     Left arm SSTi, rule out abscess    Blood cultures x2 sets no growth to date, pending final    Diarrhea, history of ulcerative colitis on Omvoh (Mirikizumab) last dose - rule out UC flare  Calprotectin: >8000-->2480-->1100  Recent colonoscopy 7/16: CHRONIC COLITIS WITH MILD SUPERFICIAL ACUTE INFLAMMATORY CHANGES.       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 labs   Please send stool for GI PCR, and culture   Stop cefepime IV   Continue vancomycin IV, keep level around 15, pharmacy to dose for left arm SSTi  Start Rocephin 2 g IV daily   Continue Flagyl 500 mg IV q.8  This for possible UC flare  Follow GI recs, might need steroids for flare  Monitor stool output   Trend WBC   Gentle IV fluids   Aspiration precautions   PT/OT as tolerated     D/w patient, nursing, Hospitalist/NP    Thank you for this consult. Please send "[employer] secure chat with any questions.    Antibiotics (From admission, onward)      Start     Stop Route Frequency Ordered    08/11/24 0000  vancomycin (VANCOCIN) 2,000 mg in D5W 500 mL IVPB         -- IV Every 24 hours (non-standard times) 08/10/24 0017    08/10/24 0000  cefepime 1g in dextrose 5% 100 mL IVPB (ready to mix)        Note to Pharmacy: Ht: 5' 5" (1.651 m)  Wt: 121.2 kg (267 lb 3.2 oz)  Estimated Creatinine Clearance: 53.4 " "mL/min (A) (based on SCr of 2 mg/dL (H)).  Body mass index is 44.46 kg/m².    -- IV Every 12 hours (non-standard times) 08/09/24 2258 08/09/24 2351  vancomycin - pharmacy to dose  (vancomycin IVPB (PEDS and ADULTS))        Placed in "And" Linked Group    -- IV pharmacy to manage frequency 08/09/24 2252 08/09/24 2145  metronidazole IVPB 500 mg         -- IV Every 8 hours (non-standard times) 08/09/24 2038          Antifungals (From admission, onward)      None           Antivirals (From admission, onward)      None            HPI      Jacoby Jean-Baptiste is a 49 y.o. male morbidly obese, BMI 44.4 kg/m2, known to our service from prior admission in December with cavitary/atypical pneumonia with positive Aspergillus antigen status post voriconazole.  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  He mentions since July 20th he started noticing redness on his left upper arm.  He was seen by surgery on 08/01 status post I&D, unfortunately no cultures were sent.  Upon chart reviewed, he had a recent prescription for levofloxacin and Flagyl which he did not  at the pharmacy.    Patient is complaining of headache, generalized weakness, fatigue, multiple, nonbloody, frequent loose bowel movements, denies any urinary symptoms.  Labs yesterday with leukocytosis 17.2, left shift 78%, H&H 14.3/43.2, platelet count 296   Hyponatremia 134, potassium 4   NAZIA, creatinine 2, down to 1.4 today, LFTs normal   UA 1+ leukocyte esterase, hyaline casts, negative for UTI   Stool for C diff negative     Antibiotic history:    Stool for C diff 8/9 negative   Blood cultures 8/9 x2 sets no growth to date, pending final    Microbiology:  "   Vancomycin 8/9   Levofloxacin 8/9   Piperacillin tazobactam 7/9   Cefepime 8/9-10  Metronidazole 8/9  Rocephin/10    Social History  Marital Status: Single  Alcohol History:  reports current alcohol use.  Tobacco History:  reports that he has quit smoking. His smoking use included cigarettes. He started smoking about 31 years ago. He has a 31.6 pack-year smoking history. He has never used smokeless tobacco.  Drug History:  reports that he does not currently use drugs.    Outdoor activities: Lives at home alone, quit smoking, no alcohol, no drugs.  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  Travel:  None  Implants:  Fecal microbiota transplant in May and September of 2023    Review of patient's allergies indicates:  No Known Allergies  Past Medical History:   Diagnosis Date    C. difficile colitis     Cavitary pneumonia 11/2023    pos aspergillus serology    Diabetes mellitus 01/2022    Hyperlipidemia 2015    Hypertension 2015    Insomnia 2015    Ulcerative colitis      Past Surgical History:   Procedure Laterality Date    BRONCHOSCOPY WITH FLUOROSCOPY Left 11/20/2023    Procedure: BRONCHOSCOPY, WITH FLUOROSCOPY;  Surgeon: Lisa Villarreal MD;  Location: [facility];  Service: Pulmonary;  Laterality: Left;    BRONCHOSCOPY WITH FLUOROSCOPY N/A 11/30/2023    Procedure: BRONCHOSCOPY, WITH FLUOROSCOPY;  Surgeon: Lisa Villarreal MD;  Location: [facility];  Service: Pulmonary;  Laterality: N/A;    CARDIAC ELECTROPHYSIOLOGY MAPPING AND ABLATION  2017    SVT    CHOLECYSTECTOMY  2011    COLONOSCOPY N/A 5/3/2022    Procedure: COLONOSCOPY;  Surgeon: Shan Jean III, MD;  Location: [facility];  Service: Endoscopy;  Laterality: N/A;    COLONOSCOPY N/A 3/16/2024    Procedure: COLONOSCOPY;  Surgeon: ALEKSANDER Ramos MD;  Location: [facility];  Service: Endoscopy;  Laterality: N/A;    COLONOSCOPY WITH FECAL MICROBIOTA TRANSFER N/A 3/21/2023    Procedure: COLONOSCOPY, WITH FECAL MICROBIOTA TRANSFER;  Surgeon:  David Millan MD;  Location: [facility];  Service: Endoscopy;  Laterality: N/A;    ESOPHAGOGASTRODUODENOSCOPY N/A 4/24/2023    Procedure: EGD (ESOPHAGOGASTRODUODENOSCOPY);  Surgeon: Shan Jean III, MD;  Location: [facility];  Service: Endoscopy;  Laterality: N/A;    ESOPHAGOGASTRODUODENOSCOPY N/A 6/5/2024    Procedure: EGD (ESOPHAGOGASTRODUODENOSCOPY);  Surgeon: Odalis Mccabe MD;  Location: [facility] ENDO;  Service: Endoscopy;  Laterality: N/A;    FLEXIBLE SIGMOIDOSCOPY N/A 6/5/2024    Procedure: SIGMOIDOSCOPY, FLEXIBLE;  Surgeon: Odalis Mccabe MD;  Location: [facility] ENDO;  Service: Endoscopy;  Laterality: N/A;    FLEXIBLE SIGMOIDOSCOPY N/A 7/16/2024    Procedure: SIGMOIDOSCOPY, FLEXIBLE;  Surgeon: Shan Jean III, MD;  Location: [facility];  Service: Endoscopy;  Laterality: N/A;    GANGLION CYST EXCISION Left 1992    UMBILICAL HERNIA REPAIR  2011    with mesh     Family History   Problem Relation Name Age of Onset    Asthma Mother         SUBJECTIVE     Review of systems  Constitutional:  Denies fevers, chills, night sweats, loss of appetite.  HEENT: Denies visual changes, ear pain, sinus congestion, mouth pain or trouble swallowing, sore throat or dental pain.  Neck: Denies neck pain or lumps.  Respiratory: Denies shortness of breath, coughing, wheezing or hemoptysis.  Cardiovascular:  Denies chest pain, palpitations or edema.  Gastrointestinal: Denies  nausea, vomiting, + diarrhea, frequent stools at least 7 to 8 times a day  Genitourinary:  Denies dysuria, frequency, urgency or hematuria   Musculoskeletal:  Denies joint pain or swelling, difficulty walking.    Skin:  Left arm with redness, tender to palpation, edema.  Denies rash or itching.  Neurologic:  Denies motor or sensory loss,+ headaches or dizziness.    Psychiatric:  Denies changes in mood or behavior.     OBJECTIVE   Temp:  [97.5 °F (36.4 °C)-98.2 °F (36.8 °C)] 98 °F (36.7 °C)  Pulse:  [71-97] 77  Resp:  [15-20] 15  SpO2:  [95 %-100  %] 97 %  BP: (108-145)/(60-93) 123/61  Temp:  [97.5 °F (36.4 °C)-98.2 °F (36.8 °C)]   Temp: 98 °F (36.7 °C) (08/10/24 0701)  Pulse: 77 (08/10/24 0821)  Resp: 15 (08/10/24 0743)  BP: 123/61 (08/10/24 0821)  SpO2: 97 % (08/10/24 0743)    Intake/Output Summary (Last 24 hours) at 8/10/2024 0857  Last data filed at 8/9/2024 2156  Gross per 24 hour   Intake 100 ml   Output --   Net 100 ml       Physical Exam  General:  Obese  male sitting in bed, breathing comfortable on room air  Eyes: Eyes with no icterus or injection. Vision grossly normal  Ears: Hearing grossly normal.  Nose: Nares patent  Mouth: Moist mucous membranes, dentition is terrible, very poor oral hygiene, severe decay. No ulcerations, erythema or exudates.  Neck: Supple, no tenderness to palpation.  Cardiovascular: Regular rate and rhythm, no murmurs, no edema.    Respiratory:  Clear to auscultation bilaterally, no tachypnea or increased work of breathing.  Gastrointestinal:  Soft and obese with active bowel sounds, no tenderness to palpation, no distention.  Genitourinary:  No suprapubic tenderness.  Musculoskeletal:  Moves all extremities with good strength.    Skin:  Warm and dry, left arm with mild cellulitis, indurated area appreciated approaching the axilla, tender to palpation, no evidence of drainage noted consistent with cellulitis  Neuro:   Oriented, conversant, follows commands.  Psych: Good mood, normal affect.   VAD: PIV  ISOLATION: None    Wounds:   8/10:      7/20:      Significant Labs: All pertinent labs within the past 24 hours have been reviewed.    CBC LAST 7  Recent Labs   Lab 08/04/24  1652 08/09/24  1151   WBC 14.30* 17.28*   RBC 4.35* 4.61   HGB 13.1* 14.3   HCT 40.7 43.2   MCV 94 94   MCH 30.1 31.0   MCHC 32.2 33.1   RDW 15.1* 14.6*    296   MPV 10.4 10.1   GRAN 73.5*  10.5* 78.0*  13.5*   LYMPH 12.9*  1.8 11.2*  1.9   MONO 9.0  1.3* 7.6  1.3*   BASO 0.13 0.10   NRBC 0 0       CHEMISTRY LAST 7  Recent Labs  "  Lab 08/04/24  1652 08/09/24  1151    134*   K 4.3 4.0    103   CO2 21* 20*   ANIONGAP 10 11   BUN 13 10   CREATININE 1.3 2.0*   GLU 96 82   CALCIUM 9.1 8.8   ALBUMIN 3.6 3.6   PROT 6.9 7.3   ALKPHOS 78 77   ALT 6* 6*   AST 9* 11   BILITOT 0.3 0.3       Estimated Creatinine Clearance: 53.4 mL/min (A) (based on SCr of 2 mg/dL (H)).    INFLAMMATORY/PROCAL  LAST 7  No results for input(s): "PROCAL", "ESR", "CRP" in the last 168 hours.  No results found for: "ESR"  CRP   Date Value Ref Range Status   07/15/2024 1.80 (H) <1.00 mg/dL Final     Comment:     CRP-Normal Application expected values:   <1.0        mg/dL   Normal Range  1.0 - 5.0  mg/dL   Indicates mild inflammation  5.0 - 10.0 mg/dL   Indicates severe inflammation  >10.0        mg/dL   Represents serious processes and   frequently         indicates the presence of a bacterial   infection.      07/12/2024 1.30 (H) <1.00 mg/dL Final     Comment:     CRP-Normal Application expected values:   <1.0        mg/dL   Normal Range  1.0 - 5.0  mg/dL   Indicates mild inflammation  5.0 - 10.0 mg/dL   Indicates severe inflammation  >10.0        mg/dL   Represents serious processes and   frequently         indicates the presence of a bacterial   infection.      06/03/2024 6.90 (H) <1.00 mg/dL Final     Comment:     CRP-Normal Application expected values:   <1.0        mg/dL   Normal Range  1.0 - 5.0  mg/dL   Indicates mild inflammation  5.0 - 10.0 mg/dL   Indicates severe inflammation  >10.0        mg/dL   Represents serious processes and   frequently         indicates the presence of a bacterial   infection.      11/30/2023 61.6 (H) 0.0 - 8.2 mg/L Final   11/22/2023 187.6 (H) 0.0 - 8.2 mg/L Final   11/20/2023 265.5 (H) 0.0 - 8.2 mg/L Final   11/17/2023 228.2 (H) 0.0 - 8.2 mg/L Final       PRIOR  MICROBIOLOGY:    Susceptibility data from last 90 days.  Collected Specimen Info Organism   07/28/24 Blood from Peripheral, Antecubital, Right No growth after 5 days. "   07/28/24 Blood from Peripheral, Antecubital, Left No growth after 5 days.   07/15/24 Blood from Peripheral, Antecubital, Left No growth after 5 days.   07/15/24 Blood from Peripheral, Antecubital, Right No growth after 5 days.         LAST 7 DAYS MICROBIOLOGY   Microbiology Results (last 7 days)       Procedure Component Value Units Date/Time    Blood culture x two cultures. Draw prior to antibiotics. [[id number]] Collected: 08/09/24 1409    Order Status: Completed Specimen: Blood Updated: 08/09/24 2158     Blood Culture, Routine No Growth to date    Narrative:      Aerobic and anaerobic    Blood culture x two cultures. Draw prior to antibiotics. [[id number]] Collected: 08/09/24 1544    Order Status: Completed Specimen: Blood Updated: 08/09/24 2158     Blood Culture, Routine No Growth to date    Narrative:      Aerobic and anaerobic  Collection has been rescheduled by CZB at 08/09/2024 15:00 Reason:   Unable to collect  Collection has been rescheduled by CZB at 08/09/2024 15:00 Reason:   Unable to collect    Clostridium difficile EIA [[id number]] Collected: 08/09/24 1439    Order Status: Completed Specimen: Stool Updated: 08/09/24 1728     C. diff Antigen Negative     C difficile Toxins A+B, EIA Negative     Comment: Testing not recommended for children <24 months old.                 CURRENT/PREVIOUS VISIT EKG  Results for orders placed or performed during the hospital encounter of 08/09/24   EKG 12-lead    Collection Time: 08/09/24 12:52 PM   Result Value Ref Range    QRS Duration 78 ms    [facility] QTC Calculation 433 ms    Narrative    Test Reason : R11.10,    Vent. Rate : 078 BPM     Atrial Rate : 078 BPM     P-R Int : 128 ms          QRS Dur : 078 ms      QT Int : 380 ms       P-R-T Axes : 009 022 022 degrees     QTc Int : 433 ms    Normal sinus rhythm  Normal ECG  Confirmed by Del RENTERIA, Josh Reddy ([id number]) on 8/9/2024 9:06:15 PM    Referred By: SELVIN   SELF           Confirmed By:Josh Mathis MD        Pathology:  7/16/2024:  07/17/2024 RDC/tml     1. RIGHT COLON BIOPSIES:   - MILD CHRONIC INFLAMMATORY CHANGES.   - NEGATIVE FOR ACUTE INFLAMMATORY REACTION.   - NEGATIVE FOR GRANULOMAS.   - WELL-CONTROLLED CMV IMMUNOHISTOCHEMICAL STAIN IS NEGATIVE.   - NEGATIVE FOR DYSPLASIA OR MALIGNANCY.     2. LEFT COLON BIOPSIES:   - CHRONIC COLITIS WITH MILD SUPERFICIAL ACUTE INFLAMMATORY CHANGES.   - WELL-CONTROLLED CMV IMMUNOHISTOCHEMICAL STAIN IS NEGATIVE.   - NEGATIVE FOR GRANULOMAS.   - NEGATIVE FOR DYSPLASIA OR MALIGNANCY.        Significant Imaging: I have reviewed all relevant and available imaging results/findings within the past 24 hours.    CXR:   No acute cardiopulmonary disease.    CT abdomen/pelvis:  Impression:  Redemonstration of findings suggesting a nonspecific proctocolitis including infectious or inflammatory etiologies.  Overall extent appears mildly increased in length/extent compared to prior examination of 08/04/2024.  No evidence of abscess or free intraperitoneal air. Stable appearing 0.4 cm calcification within the proximal right ureter without evidence of hydronephrosis.  Nonobstructing left-sided nephrolithiasis.       I spent a total of 75 minutes on the day of the visit.This includes face to face time and non-face to face time preparing to see the patient (eg, review of tests), obtaining and/or reviewing separately obtained history, documenting clinical information in the electronic or other health record, independently interpreting results and communicating results to the patient/family/caregiver, or care coordinator.    Ophelia Galindo MD  Date of Service: 08/10/2024      This note was created using [employer] voice recognition software that occasionally misinterpreted phrases or words.

## 2024-08-10 NOTE — ASSESSMENT & PLAN NOTE
NAZIA is likely due to pre-renal azotemia due to dehydration. Baseline creatinine is  1.2 . Most recent creatinine and eGFR are listed below.  Recent Labs     08/09/24  1151   CREATININE 2.0*   EGFRNORACEVR 39.9*      Plan  - NAZIA is worsening. Will continue current treatment  - Avoid nephrotoxins and renally dose meds for GFR listed above  - Monitor urine output, serial CMP, and adjust therapy as needed

## 2024-08-10 NOTE — PROGRESS NOTES
VANCOMYCIN PHARMACOKINETIC NOTE:  Vancomycin Day # 1    Objective/Assessment:    Diagnosis/Indication for Vancomycin:Intra-abdominal infection      49 y.o., male; Actual Body Weight = 121.2 kg (267 lb 3.2 oz).    The patient has the following labs:  8/10/2024 Estimated Creatinine Clearance: 53.4 mL/min (A) (based on SCr of 2 mg/dL (H)). Lab Results   Component Value Date    BUN 10 08/09/2024     Lab Results   Component Value Date    WBC 17.28 (H) 08/09/2024          Plan:  Adjust vancomycin dose and/or frequency based on the patient's actual weight and renal function:  Initiate Vancomycin 2000 mg IV every 24 hours.  Orders have been entered into patient's chart.        Vancomycin trough level has been ordered for 2300 on 08/11.    Pharmacy will manage vancomycin therapy, monitor serum vancomycin levels, monitor renal function and adjust regimen as necessary.    Thank you for allowing us to participate in this patient's care.     Jessica Aguilar 8/10/2024   Department of Pharmacy  Ext [phone]

## 2024-08-10 NOTE — SUBJECTIVE & OBJECTIVE
Past Medical History:   Diagnosis Date    C. difficile colitis     Cavitary pneumonia 11/2023    pos aspergillus serology    Diabetes mellitus 01/2022    Hyperlipidemia 2015    Hypertension 2015    Insomnia 2015    Ulcerative colitis        Past Surgical History:   Procedure Laterality Date    BRONCHOSCOPY WITH FLUOROSCOPY Left 11/20/2023    Procedure: BRONCHOSCOPY, WITH FLUOROSCOPY;  Surgeon: Lisa Villarreal MD;  Location: [facility];  Service: Pulmonary;  Laterality: Left;    BRONCHOSCOPY WITH FLUOROSCOPY N/A 11/30/2023    Procedure: BRONCHOSCOPY, WITH FLUOROSCOPY;  Surgeon: Lisa Villarreal MD;  Location: [facility];  Service: Pulmonary;  Laterality: N/A;    CARDIAC ELECTROPHYSIOLOGY MAPPING AND ABLATION  2017    SVT    CHOLECYSTECTOMY  2011    COLONOSCOPY N/A 5/3/2022    Procedure: COLONOSCOPY;  Surgeon: Shan Jean III, MD;  Location: [facility];  Service: Endoscopy;  Laterality: N/A;    COLONOSCOPY N/A 3/16/2024    Procedure: COLONOSCOPY;  Surgeon: ALEKSANDER Ramos MD;  Location: [facility];  Service: Endoscopy;  Laterality: N/A;    COLONOSCOPY WITH FECAL MICROBIOTA TRANSFER N/A 3/21/2023    Procedure: COLONOSCOPY, WITH FECAL MICROBIOTA TRANSFER;  Surgeon: David Millan MD;  Location: [facility];  Service: Endoscopy;  Laterality: N/A;    ESOPHAGOGASTRODUODENOSCOPY N/A 4/24/2023    Procedure: EGD (ESOPHAGOGASTRODUODENOSCOPY);  Surgeon: Shan Jean III, MD;  Location: [facility];  Service: Endoscopy;  Laterality: N/A;    ESOPHAGOGASTRODUODENOSCOPY N/A 6/5/2024    Procedure: EGD (ESOPHAGOGASTRODUODENOSCOPY);  Surgeon: Odalis Mccabe MD;  Location: [facility];  Service: Endoscopy;  Laterality: N/A;    FLEXIBLE SIGMOIDOSCOPY N/A 6/5/2024    Procedure: SIGMOIDOSCOPY, FLEXIBLE;  Surgeon: Odalis Mccabe MD;  Location: [facility];  Service: Endoscopy;  Laterality: N/A;    FLEXIBLE SIGMOIDOSCOPY N/A 7/16/2024    Procedure: SIGMOIDOSCOPY, FLEXIBLE;  Surgeon: Shan Jean III, MD;   Location: [facility];  Service: Endoscopy;  Laterality: N/A;    GANGLION CYST EXCISION Left 1992    UMBILICAL HERNIA REPAIR  2011    with mesh       Review of patient's allergies indicates:  No Known Allergies    Current Facility-Administered Medications on File Prior to Encounter   Medication    lactated ringers infusion     Current Outpatient Medications on File Prior to Encounter   Medication Sig    budesonide (ENTOCORT EC) 3 mg capsule Take 6 mg by mouth once daily.    busPIRone (BUSPAR) 10 MG tablet Take 1 tablet (10 mg total) by mouth 3 (three) times daily.    cholestyramine (QUESTRAN) 4 gram packet Take 1 packet by mouth once daily.    ergocalciferol (VITAMIN D2) 50,000 unit Cap Take 1 capsule (50,000 Units total) by mouth every 7 days.    hyoscyamine (LEVBID) 0.375 mg Tb12 Take 375 mcg by mouth 2 (two) times daily.    LORazepam (ATIVAN) 0.5 MG tablet Take 1 tablet (0.5 mg total) by mouth every 12 (twelve) hours as needed for Anxiety.    metFORMIN (GLUCOPHAGE-XR) 500 MG ER 24hr tablet Take 1 tablet (500 mg total) by mouth 2 (two) times daily with meals.    metoprolol succinate (TOPROL-XL) 50 MG 24 hr tablet Take 1 tablet (50 mg total) by mouth once daily.    promethazine (PHENERGAN) 12.5 MG Tab Take 12.5 mg by mouth 4 (four) times daily as needed.    sertraline (ZOLOFT) 100 MG tablet Take 1 tablet (100 mg total) by mouth once daily.    simvastatin (ZOCOR) 20 MG tablet Take 1 tablet (20 mg total) by mouth every evening. (Patient taking differently: Take 20 mg by mouth once daily.)    sulfamethoxazole-trimethoprim 800-160mg (BACTRIM DS) 800-160 mg Tab Take 1 tablet by mouth 2 (two) times daily.    zolpidem (AMBIEN) 10 mg Tab Take 1 tablet (10 mg total) by mouth nightly as needed (insomnia).    levoFLOXacin (LEVAQUIN) 750 MG tablet Take 1 tablet (750 mg total) by mouth once daily. for 7 days    lipase-protease-amylase 12,000-38,000-60,000 units (CREON) CpDR Take 3 capsules by mouth 3 (three) times daily with  meals.    metroNIDAZOLE (FLAGYL) 500 MG tablet Take 1 tablet (500 mg total) by mouth 3 (three) times daily. for 7 days    oxyCODONE-acetaminophen (PERCOCET) 5-325 mg per tablet Take 1 tablet by mouth every 6 (six) hours as needed for Pain.    rifAXIMin (XIFAXAN) 550 mg Tab Take 1 tablet (550 mg total) by mouth 3 (three) times daily.    semaglutide (OZEMPIC) 2 mg/dose (8 mg/3 mL) PnIj Inject 2 mg into the skin every 7 days. (Patient taking differently: Inject 2 mg into the skin every 7 days. mondays)    traMADoL (ULTRAM) 50 mg tablet Take 50 mg by mouth every 6 (six) hours as needed.    triamcinolone acetonide 0.1% (KENALOG) 0.1 % cream Apply topically 2 (two) times daily. (Patient taking differently: Apply 1 g topically 2 (two) times daily.)     Family History       Problem Relation (Age of Onset)    Asthma Mother          Tobacco Use    Smoking status: Former     Current packs/day: 1.00     Average packs/day: 1 pack/day for 31.6 years (31.6 ttl pk-yrs)     Types: Cigarettes     Start date: 1993    Smokeless tobacco: Never    Tobacco comments:     Pt states he has stopped smoking with Chantix three weeks ago. 12/1/23   Substance and Sexual Activity    Alcohol use: Yes     Comment: ocass    Drug use: Not Currently    Sexual activity: Not Currently     Review of Systems   Gastrointestinal:  Positive for diarrhea and nausea.     Objective:     Vital Signs (Most Recent):  Temp: 98.2 °F (36.8 °C) (08/09/24 1110)  Pulse: 72 (08/09/24 1805)  Resp: 18 (08/09/24 1929)  BP: 136/83 (08/09/24 1455)  SpO2: 95 % (08/09/24 1805) Vital Signs (24h Range):  Temp:  [98.2 °F (36.8 °C)] 98.2 °F (36.8 °C)  Pulse:  [72-97] 72  Resp:  [18-20] 18  SpO2:  [95 %-99 %] 95 %  BP: (136-142)/(83-93) 136/83     Weight: 119.7 kg (264 lb)  Body mass index is 43.93 kg/m².     Physical Exam  Vitals reviewed.   Constitutional:       General: He is awake.      Appearance: Normal appearance. He is obese.   HENT:      Head: Normocephalic and atraumatic.       Nose: No congestion.      Mouth/Throat:      Mouth: Mucous membranes are moist.      Pharynx: Oropharynx is clear.   Eyes:      Extraocular Movements: Extraocular movements intact.      Pupils: Pupils are equal, round, and reactive to light.   Cardiovascular:      Rate and Rhythm: Normal rate and regular rhythm.      Pulses: Normal pulses.      Heart sounds: Normal heart sounds.   Pulmonary:      Effort: Pulmonary effort is normal.      Breath sounds: Normal breath sounds.   Abdominal:      General: Bowel sounds are normal.      Palpations: Abdomen is soft.   Musculoskeletal:         General: Normal range of motion.      Cervical back: Normal range of motion and neck supple.   Skin:     General: Skin is warm and dry.      Capillary Refill: Capillary refill takes less than 2 seconds.      Findings: Wound present.             Comments: I&D of abscess   Neurological:      General: No focal deficit present.      Mental Status: He is alert and oriented to person, place, and time. Mental status is at baseline.   Psychiatric:         Mood and Affect: Mood normal.         Behavior: Behavior normal. Behavior is cooperative.         Judgment: Judgment normal.              CRANIAL NERVES     CN III, IV, VI   Pupils are equal, round, and reactive to light.       Significant Labs: All pertinent labs within the past 24 hours have been reviewed.  Recent Lab Results         08/09/24  1439   08/09/24  1435   08/09/24  1151        Albumin     3.6       ALP     77       ALT     6       Anion Gap     11       Appearance, UA Clear           AST     11       Bacteria, UA Rare           Baso #     0.10       Basophil %     0.6       Bilirubin (UA) Negative           BILIRUBIN TOTAL     0.3  Comment: For infants and newborns, interpretation of results should be based  on gestational age, weight and in agreement with clinical  observations.    Premature Infant recommended reference ranges:  Up to 24 hours.............<8.0 mg/dL  Up to  48 hours............<12.0 mg/dL  3-5 days..................<15.0 mg/dL  6-29 days.................<15.0 mg/dL         BUN     10       C difficile Toxins A+B, EIA Negative  Comment: Testing not recommended for children <24 months old.           C. diff Antigen Negative           Calcium     8.8       Chloride     103       CO2     20       Color, UA Yellow           Creatinine     2.0       Differential Method     Automated       eGFR     39.9       Eos #     0.3       Eos %     1.6       Glucose     82       Glucose, UA Negative           Gran # (ANC)     13.5       Gran %     78.0       Hematocrit     43.2       Hemoglobin     14.3       Hyaline Casts,            Immature Grans (Abs)     0.18  Comment: Mild elevation in immature granulocytes is non specific and   can be seen in a variety of conditions including stress response,   acute inflammation, trauma and pregnancy. Correlation with other   laboratory and clinical findings is essential.         Immature Granulocytes     1.0       Ketones, UA Negative           Leukocyte Esterase, UA 1+           Lipase     4       Lymph #     1.9       Lymph %     11.2       MCH     31.0       MCHC     33.1       MCV     94       Microscopic Comment SEE COMMENT  Comment: Other formed elements not mentioned in the report are not   present in the microscopic examination.              Mono #     1.3       Mono %     7.6       MPV     10.1       NITRITE UA Negative           nRBC     0       Blood, UA Negative           pH, UA 6.0           Platelet Count     296       POC Lactate   1.69         Potassium     4.0       PROTEIN TOTAL     7.3       Protein, UA Trace  Comment: Recommend a 24 hour urine protein or a urine   protein/creatinine ratio if globulin induced proteinuria is  clinically suspected.             RBC     4.61       RBC, UA 1           RDW     14.6       Sample   VENOUS         Sodium     134       Spec Grav UA 1.020           Specimen UA Urine, Clean  Catch           Squam Epithel, UA 1           Troponin I High Sensitivity     6.0  Comment: Troponin results differ between methods. Do not use   results between Troponin methods interchangeably.    Alkaline Phospatase levels above 400 U/L may   cause false positive results.    Access hsTnI should not be used for patients taking   Asfotase erika (Strensiq).         UROBILINOGEN UA Negative           WBC, UA 4           WBC     17.28               Significant Imaging: I have reviewed all pertinent imaging results/findings within the past 24 hours.    CT Abdomen Pelvis With IV Contrast NO Oral Contrast  Narrative: EXAMINATION:  CT ABDOMEN PELVIS WITH IV CONTRAST    CLINICAL HISTORY:  LLQ abdominal pain;    TECHNIQUE:  Low dose axial images, sagittal and coronal reformations were obtained from the lung bases to the pubic symphysis following the IV administration of 100 mL of Omnipaque 350 .  Oral contrast was not given.    COMPARISON:  08/04/2024    FINDINGS:  The lung bases are unremarkable.  There is no pleural fluid present.  The visualized portions of the heart appear normal.    There is decreased attenuation of the hepatic parenchyma suggesting hepatic steatosis.  The portal vein is patent.  The gallbladder is surgically absent.  There is no intra-or extrahepatic biliary ductal dilatation.    The stomach, spleen, pancreas, and adrenal glands are unremarkable.    Kidneys are normal in size and location enhance symmetrically.  There is a stable appearing 0.4 cm calcification within the proximal right ureter without significant hydronephrosis.  There is nonobstructing left-sided nephrolithiasis.  Urinary bladder appears within normal limits.  Prostate is unremarkable.  No significant free fluid in the pelvis.    The abdominal aorta is normal in course and caliber with mild atherosclerotic calcification along its course.  No retroperitoneal hematoma.    No evidence of small-bowel obstruction.  The terminal ileum is  unremarkable.  Redemonstration of abnormal long segment proctocolitis extending from the rectum to the hepatic flexure colon.  This appears mildly increased in overall length/extent compared to prior examination.  No evidence of abscess or free intraperitoneal air.  Appendix is not definitively visualized.  There are scattered shotty small mesenteric and retroperitoneal lymph nodes.    Osseous structures demonstrate mild degenerative changes.  The extraperitoneal soft tissues are unremarkable.  Impression: Redemonstration of findings suggesting a nonspecific proctocolitis including infectious or inflammatory etiologies.  Overall extent appears mildly increased in length/extent compared to prior examination of 08/04/2024.  No evidence of abscess or free intraperitoneal air.    Stable appearing 0.4 cm calcification within the proximal right ureter without evidence of hydronephrosis.  Nonobstructing left-sided nephrolithiasis.    Additional stable findings as above.    Electronically signed by: Kirk Dooley MD  Date:    08/09/2024  Time:    19:21  X-Ray Chest AP Portable  Narrative: EXAMINATION:  XR CHEST AP PORTABLE    CLINICAL HISTORY:  Sepsis;    COMPARISON:  07/28/2024    FINDINGS:  The patient is rotated.    Cardiac size is within normal limits.  Central pulmonary vasculature is not acutely engorged.    No confluent infiltrate or significant volume loss is observed.  No effusion is demonstrated.  There is mild apical pleural thickening bilaterally.  Impression: No acute cardiopulmonary disease.    Electronically signed by: Kelton Wells  Date:    08/09/2024  Time:    14:12

## 2024-08-10 NOTE — ASSESSMENT & PLAN NOTE
Patient is started on IV Levaquin and IV Flagyl  Use dexamethasone 12 mg daily in place of budesonide 6 mg while in-house

## 2024-08-10 NOTE — SUBJECTIVE & OBJECTIVE
Interval History: no acute events overnight.  Pain uncontrolled.  Pain regimen adjusted.  Still having some intermittent nausea.    Review of Systems   Constitutional:  Negative for chills and fever.   Gastrointestinal:  Positive for diarrhea and nausea.     Objective:     Vital Signs (Most Recent):  Temp: 97.7 °F (36.5 °C) (08/10/24 1200)  Pulse: 72 (08/10/24 1200)  Resp: 18 (08/10/24 1200)  BP: 132/68 (08/10/24 1200)  SpO2: 97 % (08/10/24 1200) Vital Signs (24h Range):  Temp:  [97.5 °F (36.4 °C)-98 °F (36.7 °C)] 97.7 °F (36.5 °C)  Pulse:  [71-84] 72  Resp:  [15-18] 18  SpO2:  [95 %-100 %] 97 %  BP: (108-145)/(60-85) 132/68     Weight: 121.2 kg (267 lb 3.2 oz)  Body mass index is 44.46 kg/m².    Intake/Output Summary (Last 24 hours) at 8/10/2024 1409  Last data filed at 8/9/2024 2156  Gross per 24 hour   Intake 100 ml   Output --   Net 100 ml         Physical Exam  Vitals reviewed.   Constitutional:       General: He is awake.      Appearance: Normal appearance. He is obese.   HENT:      Head: Normocephalic and atraumatic.      Nose: No congestion.      Mouth/Throat:      Mouth: Mucous membranes are moist.      Pharynx: Oropharynx is clear.   Eyes:      Extraocular Movements: Extraocular movements intact.      Pupils: Pupils are equal, round, and reactive to light.   Cardiovascular:      Rate and Rhythm: Normal rate and regular rhythm.      Pulses: Normal pulses.      Heart sounds: Normal heart sounds.   Pulmonary:      Effort: Pulmonary effort is normal. No respiratory distress.      Breath sounds: Normal breath sounds.   Abdominal:      General: Bowel sounds are normal.      Palpations: Abdomen is soft.      Tenderness: There is abdominal tenderness.   Musculoskeletal:         General: Normal range of motion.      Cervical back: Normal range of motion and neck supple.   Skin:     General: Skin is warm and dry.      Capillary Refill: Capillary refill takes less than 2 seconds.      Findings: Wound present.              Comments: Abscess to LUE s/p I&D; surrounding erythema to site   Neurological:      General: No focal deficit present.      Mental Status: He is alert and oriented to person, place, and time. Mental status is at baseline.   Psychiatric:         Mood and Affect: Mood normal.         Behavior: Behavior normal. Behavior is cooperative.         Judgment: Judgment normal.             Significant Labs: All pertinent labs within the past 24 hours have been reviewed.  CBC:   Recent Labs   Lab 08/09/24  1151 08/10/24  1156   WBC 17.28* 13.75*   HGB 14.3 12.8*   HCT 43.2 40.8    263     CMP:   Recent Labs   Lab 08/09/24  1151 08/10/24  0936   * 133*   K 4.0 4.2    106   CO2 20* 17*   GLU 82 54*   BUN 10 7   CREATININE 2.0* 1.4   CALCIUM 8.8 7.9*   PROT 7.3 6.3   ALBUMIN 3.6 3.2*   BILITOT 0.3 0.3   ALKPHOS 77 65   AST 11 11   ALT 6* 5*   ANIONGAP 11 10       Significant Imaging: I have reviewed all pertinent imaging results/findings within the past 24 hours.  US LUE:  Targeted ultrasound at site of clinical concern of medial left upper extremity, at site of erythema, shows no solid or cystic mass.  Superficial hypoechoic focus measuring up to 6 mm is present near this region, possibly reflecting edema, infected fluid, or scarring.

## 2024-08-10 NOTE — NURSING
Started iv 20ga extended cath left forearm ultrasound guided aseptic technique, x1 attempt, patient tolerated no site complications, visualized catheter in vessel and brisk blood aspiration, chg dressing applied and labeled.    Thank you.

## 2024-08-11 LAB
ALBUMIN SERPL BCP-MCNC: 3 G/DL (ref 3.5–5.2)
ALP SERPL-CCNC: 59 U/L (ref 55–135)
ALT SERPL W/O P-5'-P-CCNC: 5 U/L (ref 10–44)
ANION GAP SERPL CALC-SCNC: 10 MMOL/L (ref 8–16)
AST SERPL-CCNC: 8 U/L (ref 10–40)
BASOPHILS # BLD AUTO: 0.02 K/UL (ref 0–0.2)
BASOPHILS NFR BLD: 0.2 % (ref 0–1.9)
BILIRUB SERPL-MCNC: 0.2 MG/DL (ref 0.1–1)
BUN SERPL-MCNC: 5 MG/DL (ref 6–20)
CALCIUM SERPL-MCNC: 7.9 MG/DL (ref 8.7–10.5)
CHLORIDE SERPL-SCNC: 105 MMOL/L (ref 95–110)
CO2 SERPL-SCNC: 18 MMOL/L (ref 23–29)
CREAT SERPL-MCNC: 1 MG/DL (ref 0.5–1.4)
CRP SERPL-MCNC: 4.2 MG/DL
DIFFERENTIAL METHOD BLD: ABNORMAL
EOSINOPHIL # BLD AUTO: 0 K/UL (ref 0–0.5)
EOSINOPHIL NFR BLD: 0 % (ref 0–8)
ERYTHROCYTE [DISTWIDTH] IN BLOOD BY AUTOMATED COUNT: 14.4 % (ref 11.5–14.5)
EST. GFR  (NO RACE VARIABLE): >60 ML/MIN/1.73 M^2
GLUCOSE SERPL-MCNC: 122 MG/DL (ref 70–110)
GLUCOSE SERPL-MCNC: 123 MG/DL (ref 70–110)
GLUCOSE SERPL-MCNC: 160 MG/DL (ref 70–110)
GLUCOSE SERPL-MCNC: 96 MG/DL (ref 70–110)
GLUCOSE SERPL-MCNC: 98 MG/DL (ref 70–110)
HCT VFR BLD AUTO: 37.3 % (ref 40–54)
HGB BLD-MCNC: 12.2 G/DL (ref 14–18)
IMM GRANULOCYTES # BLD AUTO: 0.09 K/UL (ref 0–0.04)
IMM GRANULOCYTES NFR BLD AUTO: 0.9 % (ref 0–0.5)
LYMPHOCYTES # BLD AUTO: 0.6 K/UL (ref 1–4.8)
LYMPHOCYTES NFR BLD: 6.3 % (ref 18–48)
MAGNESIUM SERPL-MCNC: 1.4 MG/DL (ref 1.6–2.6)
MCH RBC QN AUTO: 30.6 PG (ref 27–31)
MCHC RBC AUTO-ENTMCNC: 32.7 G/DL (ref 32–36)
MCV RBC AUTO: 94 FL (ref 82–98)
MONOCYTES # BLD AUTO: 0.2 K/UL (ref 0.3–1)
MONOCYTES NFR BLD: 1.6 % (ref 4–15)
NEUTROPHILS # BLD AUTO: 9 K/UL (ref 1.8–7.7)
NEUTROPHILS NFR BLD: 91 % (ref 38–73)
NRBC BLD-RTO: 0 /100 WBC
PLATELET # BLD AUTO: 250 K/UL (ref 150–450)
PMV BLD AUTO: 10.1 FL (ref 9.2–12.9)
POTASSIUM SERPL-SCNC: 4.7 MMOL/L (ref 3.5–5.1)
PROCALCITONIN SERPL IA-MCNC: 0.17 NG/ML (ref 0–0.5)
PROT SERPL-MCNC: 6.2 G/DL (ref 6–8.4)
RBC # BLD AUTO: 3.99 M/UL (ref 4.6–6.2)
SODIUM SERPL-SCNC: 133 MMOL/L (ref 136–145)
WBC # BLD AUTO: 9.86 K/UL (ref 3.9–12.7)

## 2024-08-11 PROCEDURE — A4216 STERILE WATER/SALINE, 10 ML: HCPCS | Performed by: NURSE PRACTITIONER

## 2024-08-11 PROCEDURE — 84145 PROCALCITONIN (PCT): CPT | Performed by: STUDENT IN AN ORGANIZED HEALTH CARE EDUCATION/TRAINING PROGRAM

## 2024-08-11 PROCEDURE — C1751 CATH, INF, PER/CENT/MIDLINE: HCPCS

## 2024-08-11 PROCEDURE — 63600175 PHARM REV CODE 636 W HCPCS: Performed by: INTERNAL MEDICINE

## 2024-08-11 PROCEDURE — 25000003 PHARM REV CODE 250: Performed by: NURSE PRACTITIONER

## 2024-08-11 PROCEDURE — 85025 COMPLETE CBC W/AUTO DIFF WBC: CPT | Performed by: NURSE PRACTITIONER

## 2024-08-11 PROCEDURE — 99233 SBSQ HOSP IP/OBS HIGH 50: CPT | Mod: ,,, | Performed by: STUDENT IN AN ORGANIZED HEALTH CARE EDUCATION/TRAINING PROGRAM

## 2024-08-11 PROCEDURE — 25000003 PHARM REV CODE 250: Performed by: STUDENT IN AN ORGANIZED HEALTH CARE EDUCATION/TRAINING PROGRAM

## 2024-08-11 PROCEDURE — 63600175 PHARM REV CODE 636 W HCPCS: Performed by: STUDENT IN AN ORGANIZED HEALTH CARE EDUCATION/TRAINING PROGRAM

## 2024-08-11 PROCEDURE — 36415 COLL VENOUS BLD VENIPUNCTURE: CPT | Performed by: STUDENT IN AN ORGANIZED HEALTH CARE EDUCATION/TRAINING PROGRAM

## 2024-08-11 PROCEDURE — 86140 C-REACTIVE PROTEIN: CPT | Performed by: STUDENT IN AN ORGANIZED HEALTH CARE EDUCATION/TRAINING PROGRAM

## 2024-08-11 PROCEDURE — 63600175 PHARM REV CODE 636 W HCPCS: Performed by: NURSE PRACTITIONER

## 2024-08-11 PROCEDURE — 36410 VNPNXR 3YR/> PHY/QHP DX/THER: CPT

## 2024-08-11 PROCEDURE — 21400001 HC TELEMETRY ROOM

## 2024-08-11 PROCEDURE — 80053 COMPREHEN METABOLIC PANEL: CPT | Performed by: NURSE PRACTITIONER

## 2024-08-11 PROCEDURE — 83735 ASSAY OF MAGNESIUM: CPT | Performed by: NURSE PRACTITIONER

## 2024-08-11 RX ORDER — SODIUM CHLORIDE 0.9 % (FLUSH) 0.9 %
10 SYRINGE (ML) INJECTION EVERY 6 HOURS
Status: DISCONTINUED | OUTPATIENT
Start: 2024-08-11 | End: 2024-08-15 | Stop reason: HOSPADM

## 2024-08-11 RX ORDER — SODIUM CHLORIDE 0.9 % (FLUSH) 0.9 %
10 SYRINGE (ML) INJECTION
Status: DISCONTINUED | OUTPATIENT
Start: 2024-08-11 | End: 2024-08-15 | Stop reason: HOSPADM

## 2024-08-11 RX ORDER — METRONIDAZOLE 500 MG/100ML
500 INJECTION, SOLUTION INTRAVENOUS
Status: DISCONTINUED | OUTPATIENT
Start: 2024-08-11 | End: 2024-08-13

## 2024-08-11 RX ADMIN — METRONIDAZOLE 500 MG: 5 INJECTION, SOLUTION INTRAVENOUS at 01:08

## 2024-08-11 RX ADMIN — HYDROCODONE BITARTRATE AND ACETAMINOPHEN 1 TABLET: 10; 325 TABLET ORAL at 04:08

## 2024-08-11 RX ADMIN — METRONIDAZOLE 500 MG: 5 INJECTION, SOLUTION INTRAVENOUS at 08:08

## 2024-08-11 RX ADMIN — ATORVASTATIN CALCIUM 10 MG: 10 TABLET, FILM COATED ORAL at 09:08

## 2024-08-11 RX ADMIN — ZOLPIDEM TARTRATE 10 MG: 5 TABLET, COATED ORAL at 08:08

## 2024-08-11 RX ADMIN — BUSPIRONE HYDROCHLORIDE 10 MG: 5 TABLET ORAL at 08:08

## 2024-08-11 RX ADMIN — LORAZEPAM 0.5 MG: 0.5 TABLET ORAL at 04:08

## 2024-08-11 RX ADMIN — PANCRELIPASE 3 CAPSULE: 60000; 12000; 38000 CAPSULE, DELAYED RELEASE PELLETS ORAL at 01:08

## 2024-08-11 RX ADMIN — DEXAMETHASONE 12 MG: 4 TABLET ORAL at 09:08

## 2024-08-11 RX ADMIN — Medication 800 MG: at 01:08

## 2024-08-11 RX ADMIN — CEFTRIAXONE SODIUM 2 G: 2 INJECTION, POWDER, FOR SOLUTION INTRAMUSCULAR; INTRAVENOUS at 04:08

## 2024-08-11 RX ADMIN — PANCRELIPASE 3 CAPSULE: 60000; 12000; 38000 CAPSULE, DELAYED RELEASE PELLETS ORAL at 04:08

## 2024-08-11 RX ADMIN — BUSPIRONE HYDROCHLORIDE 10 MG: 5 TABLET ORAL at 01:08

## 2024-08-11 RX ADMIN — MORPHINE SULFATE 2 MG: 2 INJECTION, SOLUTION INTRAMUSCULAR; INTRAVENOUS at 04:08

## 2024-08-11 RX ADMIN — Medication 800 MG: at 09:08

## 2024-08-11 RX ADMIN — HYDROCODONE BITARTRATE AND ACETAMINOPHEN 1 TABLET: 10; 325 TABLET ORAL at 09:08

## 2024-08-11 RX ADMIN — VANCOMYCIN HYDROCHLORIDE 250 MG: KIT at 08:08

## 2024-08-11 RX ADMIN — Medication 6 MG: at 11:08

## 2024-08-11 RX ADMIN — Medication 800 MG: at 04:08

## 2024-08-11 RX ADMIN — METHYLPREDNISOLONE SODIUM SUCCINATE 30 MG: 40 INJECTION, POWDER, FOR SOLUTION INTRAMUSCULAR; INTRAVENOUS at 11:08

## 2024-08-11 RX ADMIN — BUSPIRONE HYDROCHLORIDE 10 MG: 5 TABLET ORAL at 09:08

## 2024-08-11 RX ADMIN — SODIUM CHLORIDE, PRESERVATIVE FREE 2 ML: 5 INJECTION INTRAVENOUS at 01:08

## 2024-08-11 RX ADMIN — LORAZEPAM 0.5 MG: 0.5 TABLET ORAL at 11:08

## 2024-08-11 RX ADMIN — METHYLPREDNISOLONE SODIUM SUCCINATE 30 MG: 40 INJECTION, POWDER, FOR SOLUTION INTRAMUSCULAR; INTRAVENOUS at 01:08

## 2024-08-11 RX ADMIN — HYDROCODONE BITARTRATE AND ACETAMINOPHEN 1 TABLET: 5; 325 TABLET ORAL at 01:08

## 2024-08-11 RX ADMIN — HYDROCODONE BITARTRATE AND ACETAMINOPHEN 1 TABLET: 10; 325 TABLET ORAL at 08:08

## 2024-08-11 RX ADMIN — METOPROLOL SUCCINATE 50 MG: 50 TABLET, FILM COATED, EXTENDED RELEASE ORAL at 09:08

## 2024-08-11 RX ADMIN — SERTRALINE HYDROCHLORIDE 100 MG: 50 TABLET ORAL at 09:08

## 2024-08-11 RX ADMIN — PANCRELIPASE 3 CAPSULE: 60000; 12000; 38000 CAPSULE, DELAYED RELEASE PELLETS ORAL at 09:08

## 2024-08-11 RX ADMIN — VANCOMYCIN HYDROCHLORIDE 250 MG: KIT at 09:08

## 2024-08-11 RX ADMIN — CHOLESTYRAMINE LIGHT 4 G: 4 POWDER, FOR SUSPENSION ORAL at 09:08

## 2024-08-11 NOTE — ASSESSMENT & PLAN NOTE
C diff checked was negative  This patient does have evidence of infective focus  My overall impression is sepsis.  Source: Abdominal and Skin and Soft Tissue (location left upper arm)  Antibiotics given-   Antibiotics (72h ago, onward)    Start     Stop Route Frequency Ordered    08/11/24 1300  metronidazole IVPB 500 mg         -- IV Every 8 hours (non-standard times) 08/11/24 1228    08/10/24 2100  vancomycin 125 mg/5 mL oral solution 250 mg         -- Oral 2 times daily 08/10/24 1447    08/10/24 1600  cefTRIAXone (ROCEPHIN) 2 g in dextrose 5 % 100 mL IVPB (ready to mix)         -- IV Every 24 hours (non-standard times) 08/10/24 1453        Latest lactate reviewed-  Recent Labs   Lab 08/09/24  1435 08/10/24  1928   LACTATE  --  1.2   POCLAC 1.69  --        Organ dysfunction indicated by Acute kidney injury    Fluid challenge Ideal Body Weight- The patient's ideal body weight is Ideal body weight: 61.5 kg (135 lb 9.3 oz) which will be used to calculate fluid bolus of 30 ml/kg for treatment of septic shock.  Lactic acid 1.69 patient given 3 L of fluid in ED    Post- resuscitation assessment No - Post resuscitation assessment not needed     Source control achieved by:  3 L IV fluid, antibiotics IV

## 2024-08-11 NOTE — SUBJECTIVE & OBJECTIVE
Interval History: midline placed today without complications.  Pt states pain is better controlled but is still bothered by nausea.    Review of Systems   Constitutional:  Negative for chills and fever.   Gastrointestinal:  Positive for diarrhea and nausea.     Objective:     Vital Signs (Most Recent):  Temp: 97.8 °F (36.6 °C) (08/11/24 1130)  Pulse: 74 (08/11/24 1130)  Resp: 18 (08/11/24 1130)  BP: 133/74 (08/11/24 1130)  SpO2: 96 % (08/11/24 1130) Vital Signs (24h Range):  Temp:  [97.8 °F (36.6 °C)-98.3 °F (36.8 °C)] 97.8 °F (36.6 °C)  Pulse:  [72-82] 74  Resp:  [15-20] 18  SpO2:  [94 %-98 %] 96 %  BP: (108-140)/(63-79) 133/74     Weight: 121.2 kg (267 lb 3.2 oz)  Body mass index is 44.46 kg/m².    Intake/Output Summary (Last 24 hours) at 8/11/2024 1302  Last data filed at 8/11/2024 0852  Gross per 24 hour   Intake 1710 ml   Output --   Net 1710 ml         Physical Exam  Vitals reviewed.   Constitutional:       General: He is awake.      Appearance: Normal appearance. He is obese.   HENT:      Head: Normocephalic and atraumatic.      Nose: No congestion.      Mouth/Throat:      Mouth: Mucous membranes are moist.      Pharynx: Oropharynx is clear.   Eyes:      Extraocular Movements: Extraocular movements intact.      Pupils: Pupils are equal, round, and reactive to light.   Cardiovascular:      Rate and Rhythm: Normal rate and regular rhythm.      Pulses: Normal pulses.      Heart sounds: Normal heart sounds.   Pulmonary:      Effort: Pulmonary effort is normal. No respiratory distress.      Breath sounds: Normal breath sounds.   Abdominal:      General: Bowel sounds are normal.      Palpations: Abdomen is soft.      Tenderness: There is abdominal tenderness.   Musculoskeletal:         General: Normal range of motion.      Cervical back: Normal range of motion and neck supple.   Skin:     General: Skin is warm and dry.      Capillary Refill: Capillary refill takes less than 2 seconds.      Findings: Wound present.              Comments: Abscess to LUE s/p I&D; surrounding erythema to site   Neurological:      General: No focal deficit present.      Mental Status: He is alert and oriented to person, place, and time. Mental status is at baseline.   Psychiatric:         Mood and Affect: Mood normal.         Behavior: Behavior normal. Behavior is cooperative.         Judgment: Judgment normal.             Significant Labs: All pertinent labs within the past 24 hours have been reviewed.  CBC:   Recent Labs   Lab 08/10/24  1156 08/11/24  0315   WBC 13.75* 9.86   HGB 12.8* 12.2*   HCT 40.8 37.3*    250     CMP:   Recent Labs   Lab 08/10/24  0936 08/11/24  0315   * 133*   K 4.2 4.7    105   CO2 17* 18*   GLU 54* 122*   BUN 7 5*   CREATININE 1.4 1.0   CALCIUM 7.9* 7.9*   PROT 6.3 6.2   ALBUMIN 3.2* 3.0*   BILITOT 0.3 0.2   ALKPHOS 65 59   AST 11 8*   ALT 5* 5*   ANIONGAP 10 10       Significant Imaging: I have reviewed all pertinent imaging results/findings within the past 24 hours.

## 2024-08-11 NOTE — PROGRESS NOTES
[facility]  Progress Note    Patient Name: Jacoby Jean-Baptiste  MRN: [id number]  Patient Class: IP- Inpatient   Admission Date: 8/9/2024  Length of Stay: 1 days  Attending Physician: Long Arizmendi MD  Primary Care Provider: Hunter Aguliar III, MD        Subjective:     Principal Problem:Sepsis        HPI:  50 y/o male with pMHx of 2 DM, C difficile colitis, ulcerative colitis, cavitary pneumonia, HLD, and hypertension who presented to the ED with complaints of vomiting and diarrhea x1 week.  Patient was seen in the ED on 08/05/2024 with similar symptoms and went home to take his Levaquin and Flagyl that was previously prescribed and did not fill the prescriptions.  Today he made decision to return to the ED with worsening symptoms.  Patient is also with left upper arm site of previous I and D of abscess on oral Bactrim.  We will stop Bactrim and consult wound care for topical treatment as patient is on systemic antibiotics with no change.  On arrival patient with a WBCs 17.2, sodium 134, CO2 20, creatinine 2.0, lactic 1.69.  CTA abdomen and pelvis with IV contrast redemonstration of findings suggesting a nonspecific proctocolitis.  Patient was given 3 L of fluid in the ED he was tested for C difficile and found to be negative for antigens and toxins.  Patient will be admitted for IV antibiotic treatment.    Overview/Hospital Course:  No notes on file    Interval History: midline placed today without complications.  Pt states pain is better controlled but is still bothered by nausea.    Review of Systems   Constitutional:  Negative for chills and fever.   Gastrointestinal:  Positive for diarrhea and nausea.     Objective:     Vital Signs (Most Recent):  Temp: 97.8 °F (36.6 °C) (08/11/24 1130)  Pulse: 74 (08/11/24 1130)  Resp: 18 (08/11/24 1130)  BP: 133/74 (08/11/24 1130)  SpO2: 96 % (08/11/24 1130) Vital Signs (24h Range):  Temp:  [97.8 °F (36.6 °C)-98.3 °F (36.8 °C)] 97.8 °F (36.6  °C)  Pulse:  [72-82] 74  Resp:  [15-20] 18  SpO2:  [94 %-98 %] 96 %  BP: (108-140)/(63-79) 133/74     Weight: 121.2 kg (267 lb 3.2 oz)  Body mass index is 44.46 kg/m².    Intake/Output Summary (Last 24 hours) at 8/11/2024 1302  Last data filed at 8/11/2024 0852  Gross per 24 hour   Intake 1710 ml   Output --   Net 1710 ml         Physical Exam  Vitals reviewed.   Constitutional:       General: He is awake.      Appearance: Normal appearance. He is obese.   HENT:      Head: Normocephalic and atraumatic.      Nose: No congestion.      Mouth/Throat:      Mouth: Mucous membranes are moist.      Pharynx: Oropharynx is clear.   Eyes:      Extraocular Movements: Extraocular movements intact.      Pupils: Pupils are equal, round, and reactive to light.   Cardiovascular:      Rate and Rhythm: Normal rate and regular rhythm.      Pulses: Normal pulses.      Heart sounds: Normal heart sounds.   Pulmonary:      Effort: Pulmonary effort is normal. No respiratory distress.      Breath sounds: Normal breath sounds.   Abdominal:      General: Bowel sounds are normal.      Palpations: Abdomen is soft.      Tenderness: There is abdominal tenderness.   Musculoskeletal:         General: Normal range of motion.      Cervical back: Normal range of motion and neck supple.   Skin:     General: Skin is warm and dry.      Capillary Refill: Capillary refill takes less than 2 seconds.      Findings: Wound present.             Comments: Abscess to LUE s/p I&D; surrounding erythema to site   Neurological:      General: No focal deficit present.      Mental Status: He is alert and oriented to person, place, and time. Mental status is at baseline.   Psychiatric:         Mood and Affect: Mood normal.         Behavior: Behavior normal. Behavior is cooperative.         Judgment: Judgment normal.             Significant Labs: All pertinent labs within the past 24 hours have been reviewed.  CBC:   Recent Labs   Lab 08/10/24  1156 08/11/24  0315   WBC  13.75* 9.86   HGB 12.8* 12.2*   HCT 40.8 37.3*    250     CMP:   Recent Labs   Lab 08/10/24  0936 08/11/24  0315   * 133*   K 4.2 4.7    105   CO2 17* 18*   GLU 54* 122*   BUN 7 5*   CREATININE 1.4 1.0   CALCIUM 7.9* 7.9*   PROT 6.3 6.2   ALBUMIN 3.2* 3.0*   BILITOT 0.3 0.2   ALKPHOS 65 59   AST 11 8*   ALT 5* 5*   ANIONGAP 10 10       Significant Imaging: I have reviewed all pertinent imaging results/findings within the past 24 hours.    Assessment/Plan:      * Sepsis  C diff checked was negative  This patient does have evidence of infective focus  My overall impression is sepsis.  Source: Abdominal and Skin and Soft Tissue (location left upper arm)  Antibiotics given-   Antibiotics (72h ago, onward)      Start     Stop Route Frequency Ordered    08/11/24 1300  metronidazole IVPB 500 mg         -- IV Every 8 hours (non-standard times) 08/11/24 1228    08/10/24 2100  vancomycin 125 mg/5 mL oral solution 250 mg         -- Oral 2 times daily 08/10/24 1447    08/10/24 1600  cefTRIAXone (ROCEPHIN) 2 g in dextrose 5 % 100 mL IVPB (ready to mix)         -- IV Every 24 hours (non-standard times) 08/10/24 1453          Latest lactate reviewed-  Recent Labs   Lab 08/09/24  1435 08/10/24  1928   LACTATE  --  1.2   POCLAC 1.69  --        Organ dysfunction indicated by Acute kidney injury    Fluid challenge Ideal Body Weight- The patient's ideal body weight is Ideal body weight: 61.5 kg (135 lb 9.3 oz) which will be used to calculate fluid bolus of 30 ml/kg for treatment of septic shock.  Lactic acid 1.69 patient given 3 L of fluid in ED    Post- resuscitation assessment No - Post resuscitation assessment not needed     Source control achieved by:  3 L IV fluid, antibiotics IV    Skin abscess  Stopped p.o. Bactrim  Keep site clean  Consult wound care nurse      Acute renal failure  NAZIA is likely due to pre-renal azotemia due to dehydration. Baseline creatinine is  1.2 . Most recent creatinine and eGFR are  "listed below.  Recent Labs     08/09/24  1151 08/10/24  0936 08/11/24  0315   CREATININE 2.0* 1.4 1.0   EGFRNORACEVR 39.9* >60.0 >60.0        Plan  - NAZIA has resolved. Continue to monitor renal function.      Proctocolitis  Patient is started on IV Levaquin and IV Flagyl  Use dexamethasone 12 mg daily in place of budesonide 6 mg while in-house  UC flare suspected-GI consulted  Likely will have endoscopy Tuesday        Ulcerative colitis  Flare suspected  Pain control  Antiemetics as needed  Dexamethasone substituted for budesonide acutely  GI consulted      Type 2 diabetes mellitus without complication, without long-term current use of insulin  Patient's FSGs are controlled on current medication regimen.  Last A1c reviewed-   Lab Results   Component Value Date    HGBA1C 5.2 06/06/2024     Most recent fingerstick glucose reviewed- No results for input(s): "POCTGLUCOSE" in the last 24 hours.  Current correctional scale  Low  Maintain anti-hyperglycemic dose as follows-   Antihyperglycemics (From admission, onward)      Start     Stop Route Frequency Ordered    08/09/24 2137  insulin aspart U-100 pen 0-5 Units         -- SubQ Before meals & nightly PRN 08/09/24 2038          Hold Oral hypoglycemics while patient is in the hospital.  Hold metformin, Ozempic-- start low dose SSI    Morbid obesity  Body mass index is 43.93 kg/m². Morbid obesity complicates all aspects of disease management from diagnostic modalities to treatment. Weight loss encouraged and health benefits explained to patient.           VTE Risk Mitigation (From admission, onward)           Ordered     Reason for No Pharmacological VTE Prophylaxis  Once        Question:  Reasons:  Answer:  Risk of Bleeding  Comment:  ulcerative colitis patient    08/09/24 2038     IP VTE HIGH RISK PATIENT  Once         08/09/24 2038     Place sequential compression device  Until discontinued         08/09/24 2038                    Discharge Planning   GERARDO:      Code " Status: Full Code   Is the patient medically ready for discharge?:     Reason for patient still in hospital (select all that apply): Patient trending condition, Treatment, and Consult recommendations  Discharge Plan A: Home with family                  JAMIA Saini  Department of Hospital Medicine   [facility]

## 2024-08-11 NOTE — PLAN OF CARE
Problem: Bariatric Environmental Safety  Goal: Safety Maintained with Care  Outcome: Progressing     Problem: Infection  Goal: Absence of Infection Signs and Symptoms  Outcome: Progressing     Problem: Sepsis/Septic Shock  Goal: Optimal Coping  Outcome: Progressing

## 2024-08-11 NOTE — ASSESSMENT & PLAN NOTE
NAZIA is likely due to pre-renal azotemia due to dehydration. Baseline creatinine is  1.2 . Most recent creatinine and eGFR are listed below.  Recent Labs     08/09/24  1151 08/10/24  0936 08/11/24  0315   CREATININE 2.0* 1.4 1.0   EGFRNORACEVR 39.9* >60.0 >60.0        Plan  - NAZIA has resolved. Continue to monitor renal function.

## 2024-08-11 NOTE — ASSESSMENT & PLAN NOTE
Patient is started on IV Levaquin and IV Flagyl  Use dexamethasone 12 mg daily in place of budesonide 6 mg while in-house  UC flare suspected-GI consulted  Likely will have endoscopy Tuesday

## 2024-08-11 NOTE — PROGRESS NOTES
On license of [facility]   Department of Infectious Disease  Progress Note        PATIENT NAME: Jacoby Jean-Baptiste  MRN: [id number]  TODAY'S DATE: 08/11/2024  ADMIT DATE: 8/9/2024  LOS: 1 days    CHIEF COMPLAINT: Vomiting and Diarrhea (X 1 week)    PRINCIPLE PROBLEM: Sepsis    REASON FOR CONSULT: Colitis and cellulitis, plz advise on abx     INTERVAL HISTORY     8/11:  Patient seen and examined, he is lying in bed, awake, alert, looks a little more comfortable today now that he is tolerating diet.   Unfortunately, continues to have multiple episodes of loose bowel movements, 10 in the last 24 hours, high suspicion for colitis flare.  Discussed with GI, no contraindication to start steroids to see if we can help his UC flare.  Plan for colonoscopy earlier next week.  Patient mentions his left arm is better, less tender, erythema is improving.  Noted to have an open wound with adipose tissue seen, no evidence of purulent drainage noted, will ask the nurse to pack it for now.  Ultrasound left arm no solid or cystic mass.  Superficial hypoechoic focus measuring up to 6 mm, possibly reflecting edema or scaring.  Hemodynamically stable, afebrile.  Adequate urinary output.  Micro reviewed, stool for C diff negative, GI PCR nothing detected, stool culture nothing significant to date.  Blood cultures x2 sets no growth to date, pending final.  Labs reviewed, leukocytosis improved 9.8, left shift 91%, H&H 12.2/37.3, platelet count 250.  Hyponatremia 133, potassium 4.7, NAZIA resolved creatinine 1, AST 8/ALT 5.  Procalcitonin is negative 0.1, CRP 4.2, slightly high likely from underlying UC flare.      Antibiotics (From admission, onward)      Start     Stop Route Frequency Ordered    08/10/24 2200  metronidazole IVPB 500 mg         -- IV Every 8 hours (non-standard times) 08/10/24 1453    08/10/24 2100  vancomycin 125 mg/5 mL oral solution 250 mg         -- Oral 2 times daily 08/10/24 1447    08/10/24 1600  cefTRIAXone (ROCEPHIN) 2  g in dextrose 5 % 100 mL IVPB (ready to mix)         -- IV Every 24 hours (non-standard times) 08/10/24 1453           Antifungals (From admission, onward)      None           Antivirals (From admission, onward)      None               ASSESSMENT and PLAN     Left arm SSTi, rule out abscess    Blood cultures x2 sets no growth to date, pending final   Soft tissue US left arm: Superficial hypoechoic focus measuring up to 6 mm is present near this region, possibly reflecting edema, infected fluid, or scarring   CRP 4.2, slightly high   MRSA nasal swab negative    Diarrhea, history of ulcerative colitis on Omvoh (Mirikizumab) last dose - rule out UC flare  Calprotectin: >8000-->2480-->1100  Recent colonoscopy 7/16: CHRONIC COLITIS WITH MILD SUPERFICIAL ACUTE INFLAMMATORY CHANGES.   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 Please send Epic secure chat with any questions.    ANN Jean-Baptiste is a 49 y.o. male morbidly obese, BMI 44.4 kg/m2, known to our service from prior admission in December with cavitary/atypical pneumonia with positive Aspergillus antigen status post voriconazole.  He has past medical history of ulcerative colitis currently on Omvoh (mirikizumab) has received so far 4 doses, C diff status post fecal microbiota transplant  in March and September of 2023, diabetes, HTN, HLD, anemia, anxiety/insomnia, hepatitis-B positive serology, GERD who recently completed a taper dose of steroids for ulcerative colitis.  He mentions since July 20th he started noticing redness on his left upper arm.  He was seen by surgery on 08/01 status post I&D, unfortunately no cultures were sent.  Upon chart reviewed, he had a recent prescription for levofloxacin and Flagyl which he did not  at the pharmacy.    Patient is complaining of headache, generalized weakness, fatigue, multiple, nonbloody, frequent loose bowel movements, denies any urinary symptoms.  Labs yesterday with leukocytosis 17.2, left shift 78%, H&H 14.3/43.2, platelet count 296   Hyponatremia 134, potassium 4   NAZIA, creatinine 2, down to 1.4 today, LFTs normal   UA 1+ leukocyte esterase, hyaline casts, negative for UTI   Stool for C diff negative     Antibiotic history:    Stool for C diff 8/9 negative   Blood cultures 8/9 x2 sets no growth to date, pending final    Microbiology:    Vancomycin 8/9   Levofloxacin 8/9   Piperacillin tazobactam 7/9   Cefepime 8/9-10  Metronidazole 8/9  Rocephin 8/10    Social History  Marital Status: Single  Alcohol History:  reports current alcohol use.  Tobacco History:  reports that he has quit smoking. His smoking use included cigarettes. He started smoking about 31 years ago. He has a 31.6 pack-year smoking history. He has never used smokeless tobacco.  Drug History:  reports that he does not currently use drugs.    Outdoor activities: Lives at home alone, quit smoking, no alcohol, no drugs.  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  Travel:  None  Implants:  Fecal microbiota transplant in May and September of 2023    Review of patient's allergies indicates:  No Known Allergies  Past Medical History:   Diagnosis Date    C. difficile colitis     Cavitary pneumonia 11/2023    pos aspergillus serology    Diabetes mellitus 01/2022    Hyperlipidemia  2015    Hypertension 2015    Insomnia 2015    Ulcerative colitis      Past Surgical History:   Procedure Laterality Date    BRONCHOSCOPY WITH FLUOROSCOPY Left 11/20/2023    Procedure: BRONCHOSCOPY, WITH FLUOROSCOPY;  Surgeon: Lisa Villarreal MD;  Location: [facility];  Service: Pulmonary;  Laterality: Left;    BRONCHOSCOPY WITH FLUOROSCOPY N/A 11/30/2023    Procedure: BRONCHOSCOPY, WITH FLUOROSCOPY;  Surgeon: Lisa Villarreal MD;  Location: [facility];  Service: Pulmonary;  Laterality: N/A;    CARDIAC ELECTROPHYSIOLOGY MAPPING AND ABLATION  2017    SVT    CHOLECYSTECTOMY  2011    COLONOSCOPY N/A 5/3/2022    Procedure: COLONOSCOPY;  Surgeon: Shan Jean III, MD;  Location: [facility];  Service: Endoscopy;  Laterality: N/A;    COLONOSCOPY N/A 3/16/2024    Procedure: COLONOSCOPY;  Surgeon: ALEKSANDER Ramos MD;  Location: [facility];  Service: Endoscopy;  Laterality: N/A;    COLONOSCOPY WITH FECAL MICROBIOTA TRANSFER N/A 3/21/2023    Procedure: COLONOSCOPY, WITH FECAL MICROBIOTA TRANSFER;  Surgeon: David Millan MD;  Location: [facility];  Service: Endoscopy;  Laterality: N/A;    ESOPHAGOGASTRODUODENOSCOPY N/A 4/24/2023    Procedure: EGD (ESOPHAGOGASTRODUODENOSCOPY);  Surgeon: Shan Jean III, MD;  Location: [facility];  Service: Endoscopy;  Laterality: N/A;    ESOPHAGOGASTRODUODENOSCOPY N/A 6/5/2024    Procedure: EGD (ESOPHAGOGASTRODUODENOSCOPY);  Surgeon: Odalis Mccabe MD;  Location: [facility];  Service: Endoscopy;  Laterality: N/A;    FLEXIBLE SIGMOIDOSCOPY N/A 6/5/2024    Procedure: SIGMOIDOSCOPY, FLEXIBLE;  Surgeon: Odalis Mccabe MD;  [facility];  Service: Endoscopy;  Laterality: N/A;    FLEXIBLE SIGMOIDOSCOPY N/A 7/16/2024    Procedure: SIGMOIDOSCOPY, FLEXIBLE;  Surgeon: Shan Jean III, MD;  Location: [facility];  Service: Endoscopy;  Laterality: N/A;    GANGLION CYST EXCISION Left 1992    UMBILICAL HERNIA REPAIR  2011    with mesh     Family History   Problem  Relation Name Age of Onset    Asthma Mother         SUBJECTIVE     Review of systems  Review of systems obtained and negative except as stated above in Interval History     OBJECTIVE   Temp:  [97.7 °F (36.5 °C)-98.3 °F (36.8 °C)] 98 °F (36.7 °C)  Pulse:  [72-82] 77  Resp:  [15-20] 20  SpO2:  [94 %-98 %] 97 %  BP: (108-140)/(63-79) 140/73  Temp:  [97.7 °F (36.5 °C)-98.3 °F (36.8 °C)]   Temp: 98 °F (36.7 °C) (08/11/24 0730)  Pulse: 77 (08/11/24 0730)  Resp: 20 (08/11/24 0908)  BP: (!) 140/73 (08/11/24 0730)  SpO2: 97 % (08/11/24 0730)    Intake/Output Summary (Last 24 hours) at 8/11/2024 1115  Last data filed at 8/11/2024 0852  Gross per 24 hour   Intake 1710 ml   Output --   Net 1710 ml       Physical Exam  General:  Obese  male sitting in bed, breathing comfortable on room air  Eyes: Eyes with no icterus or injection. Vision grossly normal  Ears: Hearing grossly normal.  Nose: Nares patent  Mouth: Moist mucous membranes, dentition is terrible, very poor oral hygiene, severe decay. No ulcerations, erythema or exudates.  Neck: Supple  Cardiovascular: Regular rate and rhythm, no murmurs, no edema.    Respiratory:  Clear to auscultation bilaterally, no tachypnea or increased work of breathing.  Gastrointestinal:  Soft and obese with active bowel sounds, no tenderness to palpation, no distention.  Genitourinary:  No suprapubic tenderness.  Musculoskeletal:  Moves all extremities with good strength.    Skin:  Warm and dry, Left arm with resolving erythema, 2 small wounds noted, no evidence of pus, adipose tissue seen consistent with resolving cellulitis, nontender to palpation -, needs to be packed  8/10: left arm with mild cellulitis, indurated area appreciated approaching the axilla, tender to palpation, no evidence of drainage noted consistent with cellulitis  Neuro:   Oriented, conversant, follows commands.  Psych: Good mood, normal affect.   VAD: PIV  ISOLATION: None    Wounds:  "  8/11:      8/10:      7/20:      Significant Labs: All pertinent labs within the past 24 hours have been reviewed.    CBC LAST 7  Recent Labs   Lab 08/04/24  1652 08/09/24  1151 08/10/24  1156 08/11/24  0315   WBC 14.30* 17.28* 13.75* 9.86   RBC 4.35* 4.61 4.20* 3.99*   HGB 13.1* 14.3 12.8* 12.2*   HCT 40.7 43.2 40.8 37.3*   MCV 94 94 97 94   MCH 30.1 31.0 30.5 30.6   MCHC 32.2 33.1 31.4* 32.7   RDW 15.1* 14.6* 14.8* 14.4    296 263 250   MPV 10.4 10.1 9.7 10.1   GRAN 73.5*  10.5* 78.0*  13.5* 79.1*  10.9* 91.0*  9.0*   LYMPH 12.9*  1.8 11.2*  1.9 10.3*  1.4 6.3*  0.6*   MONO 9.0  1.3* 7.6  1.3* 7.1  1.0 1.6*  0.2*   BASO 0.13 0.10 0.10 0.02   NRBC 0 0 0 0       CHEMISTRY LAST 7  Recent Labs   Lab 08/04/24  1652 08/09/24  1151 08/10/24  0936 08/11/24  0315    134* 133* 133*   K 4.3 4.0 4.2 4.7    103 106 105   CO2 21* 20* 17* 18*   ANIONGAP 10 11 10 10   BUN 13 10 7 5*   CREATININE 1.3 2.0* 1.4 1.0   GLU 96 82 54* 122*   CALCIUM 9.1 8.8 7.9* 7.9*   MG  --   --  1.0* 1.4*   ALBUMIN 3.6 3.6 3.2* 3.0*   PROT 6.9 7.3 6.3 6.2   ALKPHOS 78 77 65 59   ALT 6* 6* 5* 5*   AST 9* 11 11 8*   BILITOT 0.3 0.3 0.3 0.2       Estimated Creatinine Clearance: 106.8 mL/min (based on SCr of 1 mg/dL).    INFLAMMATORY/PROCAL  LAST 7  Recent Labs   Lab 08/11/24  0315   PROCAL 0.166   CRP 4.20*     No results found for: "ESR"  CRP   Date Value Ref Range Status   08/11/2024 4.20 (H) <1.00 mg/dL Final     Comment:     CRP-Normal Application expected values:   <1.0        mg/dL   Normal Range  1.0 - 5.0  mg/dL   Indicates mild inflammation  5.0 - 10.0 mg/dL   Indicates severe inflammation  >10.0        mg/dL   Represents serious processes and   frequently         indicates the presence of a bacterial   infection.      07/15/2024 1.80 (H) <1.00 mg/dL Final     Comment:     CRP-Normal Application expected values:   <1.0        mg/dL   Normal Range  1.0 - 5.0  mg/dL   Indicates mild inflammation  5.0 - 10.0 mg/dL  "  Indicates severe inflammation  >10.0        mg/dL   Represents serious processes and   frequently         indicates the presence of a bacterial   infection.      07/12/2024 1.30 (H) <1.00 mg/dL Final     Comment:     CRP-Normal Application expected values:   <1.0        mg/dL   Normal Range  1.0 - 5.0  mg/dL   Indicates mild inflammation  5.0 - 10.0 mg/dL   Indicates severe inflammation  >10.0        mg/dL   Represents serious processes and   frequently         indicates the presence of a bacterial   infection.      06/03/2024 6.90 (H) <1.00 mg/dL Final     Comment:     CRP-Normal Application expected values:   <1.0        mg/dL   Normal Range  1.0 - 5.0  mg/dL   Indicates mild inflammation  5.0 - 10.0 mg/dL   Indicates severe inflammation  >10.0        mg/dL   Represents serious processes and   frequently         indicates the presence of a bacterial   infection.      11/30/2023 61.6 (H) 0.0 - 8.2 mg/L Final   11/22/2023 187.6 (H) 0.0 - 8.2 mg/L Final   11/20/2023 265.5 (H) 0.0 - 8.2 mg/L Final       PRIOR  MICROBIOLOGY:    Susceptibility data from last 90 days.  Collected Specimen Info Organism   07/28/24 Blood from Peripheral, Antecubital, Right No growth after 5 days.   07/28/24 Blood from Peripheral, Antecubital, Left No growth after 5 days.   07/15/24 Blood from Peripheral, Antecubital, Left No growth after 5 days.   07/15/24 Blood from Peripheral, Antecubital, Right No growth after 5 days.         LAST 7 DAYS MICROBIOLOGY   Microbiology Results (last 7 days)       Procedure Component Value Units Date/Time    Stool culture [[id number]] Collected: 08/10/24 1210    Order Status: Completed Specimen: Stool Updated: 08/11/24 0814     Stool Culture Nothing significant to date    MRSA Screen by PCR [[id number]] Collected: 08/10/24 1650    Order Status: Completed Specimen: Nasal Swab Updated: 08/10/24 1829     MRSA SCREEN BY PCR Negative    Blood culture x two cultures. Draw prior to antibiotics. [[id number]]  Collected: 08/09/24 1409    Order Status: Completed Specimen: Blood Updated: 08/10/24 1632     Blood Culture, Routine No Growth to date      No Growth to date    Narrative:      Aerobic and anaerobic    Blood culture x two cultures. Draw prior to antibiotics. [[id number]] Collected: 08/09/24 1544    Order Status: Completed Specimen: Blood Updated: 08/10/24 1632     Blood Culture, Routine No Growth to date      No Growth to date    Narrative:      Aerobic and anaerobic  Collection has been rescheduled by [facility] at 08/09/2024 15:00 Reason:   Unable to collect  Collection has been rescheduled by [facility] at 08/09/2024 15:00 Reason:   Unable to collect    Clostridium difficile EIA [[id number]] Collected: 08/09/24 1439    Order Status: Completed Specimen: Stool Updated: 08/09/24 1728     C. diff Antigen Negative     C difficile Toxins A+B, EIA Negative     Comment: Testing not recommended for children <24 months old.                 CURRENT/PREVIOUS VISIT EKG  Results for orders placed or performed during the hospital encounter of 08/09/24   EKG 12-lead    Collection Time: 08/09/24 12:52 PM   Result Value Ref Range    QRS Duration 78 ms    [facility] QTC Calculation 433 ms    Narrative    Test Reason : R11.10,    Vent. Rate : 078 BPM     Atrial Rate : 078 BPM     P-R Int : 128 ms          QRS Dur : 078 ms      QT Int : 380 ms       P-R-T Axes : 009 022 022 degrees     QTc Int : 433 ms    Normal sinus rhythm  Normal ECG  Confirmed by Del RENTERIA, Josh Reddy ([id number]) on 8/9/2024 9:06:15 PM    Referred By: SELVIN   SELF           Confirmed By:Josh Mathis MD       Pathology:  7/16/2024:  07/17/2024 RDC/tml     1. RIGHT COLON BIOPSIES:   - MILD CHRONIC INFLAMMATORY CHANGES.   - NEGATIVE FOR ACUTE INFLAMMATORY REACTION.   - NEGATIVE FOR GRANULOMAS.   - WELL-CONTROLLED CMV IMMUNOHISTOCHEMICAL STAIN IS NEGATIVE.   - NEGATIVE FOR DYSPLASIA OR MALIGNANCY.     2. LEFT COLON BIOPSIES:   - CHRONIC COLITIS WITH MILD SUPERFICIAL ACUTE  INFLAMMATORY CHANGES.   - WELL-CONTROLLED CMV IMMUNOHISTOCHEMICAL STAIN IS NEGATIVE.   - NEGATIVE FOR GRANULOMAS.   - NEGATIVE FOR DYSPLASIA OR MALIGNANCY.        Significant Imaging: I have reviewed all relevant and available imaging results/findings within the past 24 hours.    CXR:   No acute cardiopulmonary disease.    CT abdomen/pelvis:  Impression:  Redemonstration of findings suggesting a nonspecific proctocolitis including infectious or inflammatory etiologies.  Overall extent appears mildly increased in length/extent compared to prior examination of 08/04/2024.  No evidence of abscess or free intraperitoneal air. Stable appearing 0.4 cm calcification within the proximal right ureter without evidence of hydronephrosis.  Nonobstructing left-sided nephrolithiasis.     Soft tissue US:   FINDINGS:  Targeted ultrasound at site of clinical concern of medial left upper extremity, at site of erythema, shows no solid or cystic mass.  Superficial hypoechoic focus measuring up to 6 mm is present near this region, possibly reflecting edema, infected fluid, or scarring.      I spent a total of 55 minutes on the day of the visit.This includes face to face time and non-face to face time preparing to see the patient (eg, review of tests), obtaining and/or reviewing separately obtained history, documenting clinical information in the electronic or other health record, independently interpreting results and communicating results to the patient/family/caregiver, or care coordinator.    Ophelia Galindo MD  Date of Service: 08/11/2024      This note was created using [employer] voice recognition software that occasionally misinterpreted phrases or words.

## 2024-08-12 LAB
ALBUMIN SERPL BCP-MCNC: 3.3 G/DL (ref 3.5–5.2)
ALP SERPL-CCNC: 54 U/L (ref 55–135)
ALT SERPL W/O P-5'-P-CCNC: 7 U/L (ref 10–44)
ANION GAP SERPL CALC-SCNC: 8 MMOL/L (ref 8–16)
AST SERPL-CCNC: 12 U/L (ref 10–40)
BACTERIA STL CULT: NORMAL
BACTERIA STL CULT: NORMAL
BASOPHILS # BLD AUTO: 0.06 K/UL (ref 0–0.2)
BASOPHILS NFR BLD: 0.4 % (ref 0–1.9)
BILIRUB SERPL-MCNC: 0.2 MG/DL (ref 0.1–1)
BUN SERPL-MCNC: 7 MG/DL (ref 6–20)
CALCIUM SERPL-MCNC: 8.3 MG/DL (ref 8.7–10.5)
CHLORIDE SERPL-SCNC: 105 MMOL/L (ref 95–110)
CO2 SERPL-SCNC: 22 MMOL/L (ref 23–29)
CREAT SERPL-MCNC: 1 MG/DL (ref 0.5–1.4)
DIFFERENTIAL METHOD BLD: ABNORMAL
EOSINOPHIL # BLD AUTO: 0 K/UL (ref 0–0.5)
EOSINOPHIL NFR BLD: 0 % (ref 0–8)
ERYTHROCYTE [DISTWIDTH] IN BLOOD BY AUTOMATED COUNT: 14.6 % (ref 11.5–14.5)
EST. GFR  (NO RACE VARIABLE): >60 ML/MIN/1.73 M^2
GLUCOSE SERPL-MCNC: 114 MG/DL (ref 70–110)
GLUCOSE SERPL-MCNC: 129 MG/DL (ref 70–110)
GLUCOSE SERPL-MCNC: 131 MG/DL (ref 70–110)
GLUCOSE SERPL-MCNC: 153 MG/DL (ref 70–110)
GLUCOSE SERPL-MCNC: 169 MG/DL (ref 70–110)
HCT VFR BLD AUTO: 38.3 % (ref 40–54)
HGB BLD-MCNC: 12.2 G/DL (ref 14–18)
IMM GRANULOCYTES # BLD AUTO: 0.19 K/UL (ref 0–0.04)
IMM GRANULOCYTES NFR BLD AUTO: 1.3 % (ref 0–0.5)
LYMPHOCYTES # BLD AUTO: 1 K/UL (ref 1–4.8)
LYMPHOCYTES NFR BLD: 7.4 % (ref 18–48)
MAGNESIUM SERPL-MCNC: 1.5 MG/DL (ref 1.6–2.6)
MCH RBC QN AUTO: 30.1 PG (ref 27–31)
MCHC RBC AUTO-ENTMCNC: 31.9 G/DL (ref 32–36)
MCV RBC AUTO: 95 FL (ref 82–98)
MONOCYTES # BLD AUTO: 0.6 K/UL (ref 0.3–1)
MONOCYTES NFR BLD: 3.9 % (ref 4–15)
NEUTROPHILS # BLD AUTO: 12.3 K/UL (ref 1.8–7.7)
NEUTROPHILS NFR BLD: 87 % (ref 38–73)
NRBC BLD-RTO: 0 /100 WBC
PLATELET # BLD AUTO: 262 K/UL (ref 150–450)
PMV BLD AUTO: 9.8 FL (ref 9.2–12.9)
POTASSIUM SERPL-SCNC: 4.4 MMOL/L (ref 3.5–5.1)
PROT SERPL-MCNC: 6.4 G/DL (ref 6–8.4)
RBC # BLD AUTO: 4.05 M/UL (ref 4.6–6.2)
SODIUM SERPL-SCNC: 135 MMOL/L (ref 136–145)
WBC # BLD AUTO: 14.11 K/UL (ref 3.9–12.7)

## 2024-08-12 PROCEDURE — 87075 CULTR BACTERIA EXCEPT BLOOD: CPT | Performed by: NURSE PRACTITIONER

## 2024-08-12 PROCEDURE — 25000003 PHARM REV CODE 250: Performed by: NURSE PRACTITIONER

## 2024-08-12 PROCEDURE — 25000003 PHARM REV CODE 250: Performed by: HOSPITALIST

## 2024-08-12 PROCEDURE — 80053 COMPREHEN METABOLIC PANEL: CPT | Performed by: NURSE PRACTITIONER

## 2024-08-12 PROCEDURE — 99233 SBSQ HOSP IP/OBS HIGH 50: CPT | Mod: ,,, | Performed by: NURSE PRACTITIONER

## 2024-08-12 PROCEDURE — 87205 SMEAR GRAM STAIN: CPT | Performed by: NURSE PRACTITIONER

## 2024-08-12 PROCEDURE — 63600175 PHARM REV CODE 636 W HCPCS: Mod: JZ,JG | Performed by: STUDENT IN AN ORGANIZED HEALTH CARE EDUCATION/TRAINING PROGRAM

## 2024-08-12 PROCEDURE — A4216 STERILE WATER/SALINE, 10 ML: HCPCS | Performed by: NURSE PRACTITIONER

## 2024-08-12 PROCEDURE — 99221 1ST HOSP IP/OBS SF/LOW 40: CPT | Mod: ,,, | Performed by: FAMILY MEDICINE

## 2024-08-12 PROCEDURE — 83735 ASSAY OF MAGNESIUM: CPT | Performed by: NURSE PRACTITIONER

## 2024-08-12 PROCEDURE — 25000003 PHARM REV CODE 250: Performed by: STUDENT IN AN ORGANIZED HEALTH CARE EDUCATION/TRAINING PROGRAM

## 2024-08-12 PROCEDURE — 87070 CULTURE OTHR SPECIMN AEROBIC: CPT | Performed by: NURSE PRACTITIONER

## 2024-08-12 PROCEDURE — 63600175 PHARM REV CODE 636 W HCPCS: Performed by: NURSE PRACTITIONER

## 2024-08-12 PROCEDURE — 36415 COLL VENOUS BLD VENIPUNCTURE: CPT | Performed by: NURSE PRACTITIONER

## 2024-08-12 PROCEDURE — 12000002 HC ACUTE/MED SURGE SEMI-PRIVATE ROOM

## 2024-08-12 PROCEDURE — 85025 COMPLETE CBC W/AUTO DIFF WBC: CPT | Performed by: NURSE PRACTITIONER

## 2024-08-12 PROCEDURE — 63600175 PHARM REV CODE 636 W HCPCS: Performed by: INTERNAL MEDICINE

## 2024-08-12 PROCEDURE — 63600175 PHARM REV CODE 636 W HCPCS: Performed by: STUDENT IN AN ORGANIZED HEALTH CARE EDUCATION/TRAINING PROGRAM

## 2024-08-12 RX ORDER — VANCOMYCIN HCL 50 MG/ML
125 SOLUTION, RECONSTITUTED, ORAL ORAL 2 TIMES DAILY
Status: DISCONTINUED | OUTPATIENT
Start: 2024-08-12 | End: 2024-08-13

## 2024-08-12 RX ADMIN — SODIUM CHLORIDE, PRESERVATIVE FREE 10 ML: 5 INJECTION INTRAVENOUS at 12:08

## 2024-08-12 RX ADMIN — Medication 800 MG: at 09:08

## 2024-08-12 RX ADMIN — BUSPIRONE HYDROCHLORIDE 10 MG: 5 TABLET ORAL at 04:08

## 2024-08-12 RX ADMIN — ZOLPIDEM TARTRATE 10 MG: 5 TABLET, COATED ORAL at 09:08

## 2024-08-12 RX ADMIN — PANCRELIPASE 3 CAPSULE: 60000; 12000; 38000 CAPSULE, DELAYED RELEASE PELLETS ORAL at 08:08

## 2024-08-12 RX ADMIN — CEFTRIAXONE SODIUM 2 G: 2 INJECTION, POWDER, FOR SOLUTION INTRAMUSCULAR; INTRAVENOUS at 05:08

## 2024-08-12 RX ADMIN — HYDROCODONE BITARTRATE AND ACETAMINOPHEN 1 TABLET: 10; 325 TABLET ORAL at 06:08

## 2024-08-12 RX ADMIN — ATORVASTATIN CALCIUM 10 MG: 10 TABLET, FILM COATED ORAL at 08:08

## 2024-08-12 RX ADMIN — HYDROCODONE BITARTRATE AND ACETAMINOPHEN 1 TABLET: 10; 325 TABLET ORAL at 09:08

## 2024-08-12 RX ADMIN — VANCOMYCIN HYDROCHLORIDE 250 MG: KIT at 08:08

## 2024-08-12 RX ADMIN — SERTRALINE HYDROCHLORIDE 100 MG: 50 TABLET ORAL at 08:08

## 2024-08-12 RX ADMIN — METRONIDAZOLE 500 MG: 5 INJECTION, SOLUTION INTRAVENOUS at 10:08

## 2024-08-12 RX ADMIN — METRONIDAZOLE 500 MG: 5 INJECTION, SOLUTION INTRAVENOUS at 05:08

## 2024-08-12 RX ADMIN — PANCRELIPASE 3 CAPSULE: 60000; 12000; 38000 CAPSULE, DELAYED RELEASE PELLETS ORAL at 04:08

## 2024-08-12 RX ADMIN — HYDROCODONE BITARTRATE AND ACETAMINOPHEN 1 TABLET: 10; 325 TABLET ORAL at 11:08

## 2024-08-12 RX ADMIN — METOPROLOL SUCCINATE 50 MG: 50 TABLET, FILM COATED, EXTENDED RELEASE ORAL at 08:08

## 2024-08-12 RX ADMIN — ONDANSETRON 4 MG: 2 INJECTION INTRAMUSCULAR; INTRAVENOUS at 06:08

## 2024-08-12 RX ADMIN — Medication 800 MG: at 04:08

## 2024-08-12 RX ADMIN — SODIUM CHLORIDE, PRESERVATIVE FREE 10 ML: 5 INJECTION INTRAVENOUS at 06:08

## 2024-08-12 RX ADMIN — BUSPIRONE HYDROCHLORIDE 10 MG: 5 TABLET ORAL at 08:08

## 2024-08-12 RX ADMIN — VANCOMYCIN HYDROCHLORIDE 125 MG: KIT at 09:08

## 2024-08-12 RX ADMIN — VANCOMYCIN HYDROCHLORIDE 2000 MG: 500 INJECTION, POWDER, LYOPHILIZED, FOR SOLUTION INTRAVENOUS at 05:08

## 2024-08-12 RX ADMIN — BUSPIRONE HYDROCHLORIDE 10 MG: 5 TABLET ORAL at 09:08

## 2024-08-12 RX ADMIN — MORPHINE SULFATE 2 MG: 2 INJECTION, SOLUTION INTRAMUSCULAR; INTRAVENOUS at 05:08

## 2024-08-12 RX ADMIN — CHOLESTYRAMINE LIGHT 4 G: 4 POWDER, FOR SUSPENSION ORAL at 09:08

## 2024-08-12 RX ADMIN — METRONIDAZOLE 500 MG: 5 INJECTION, SOLUTION INTRAVENOUS at 01:08

## 2024-08-12 RX ADMIN — LORAZEPAM 0.5 MG: 0.5 TABLET ORAL at 09:08

## 2024-08-12 RX ADMIN — METHYLPREDNISOLONE SODIUM SUCCINATE 30 MG: 40 INJECTION, POWDER, FOR SOLUTION INTRAMUSCULAR; INTRAVENOUS at 11:08

## 2024-08-12 RX ADMIN — PANCRELIPASE 3 CAPSULE: 60000; 12000; 38000 CAPSULE, DELAYED RELEASE PELLETS ORAL at 11:08

## 2024-08-12 NOTE — PLAN OF CARE
"Patient being closely followed by ID and GI.     Per GI note, "We will plan for possible repeat endoscopic evaluation Tuesday with Dr Jean. "     CM following     08/12/24 1336   Discharge Reassessment   Assessment Type Discharge Planning Reassessment   Did the patient's condition or plan change since previous assessment? No   Discharge Plan discussed with: Patient   Discharge Plan A Home with family   Why the patient remains in the hospital Requires continued medical care   Post-Acute Status   Discharge Delays (!) Procedure Scheduling (IR, OR, Labs, Echo, Cath, Echo, EEG)       "

## 2024-08-12 NOTE — CONSULTS
Chief complaint:  Vomiting and Diarrhea (X 1 week)      HPI:  Jacoby Jean-Baptiste is a 49 y.o. male presenting with an abscess of the left upper arm S/P I and D. Pt pt wound still has some thick drainage from the wound and it is currently packed. Pt has no new complaints today    PMH:  As per HPI and below:  Past Medical History:   Diagnosis Date    C. difficile colitis     Cavitary pneumonia 11/2023    pos aspergillus serology    Diabetes mellitus 01/2022    Hyperlipidemia 2015    Hypertension 2015    Insomnia 2015    Ulcerative colitis        Social History     Socioeconomic History    Marital status: Single   Tobacco Use    Smoking status: Former     Current packs/day: 1.00     Average packs/day: 1 pack/day for 31.6 years (31.6 ttl pk-yrs)     Types: Cigarettes     Start date: 1993    Smokeless tobacco: Never    Tobacco comments:     Pt states he has stopped smoking with Chantix three weeks ago. 12/1/23   Substance and Sexual Activity    Alcohol use: Yes     Comment: ocass    Drug use: Not Currently    Sexual activity: Not Currently     Social Determinants of Health     Financial Resource Strain: Low Risk  (8/10/2024)    Overall Financial Resource Strain (CARDIA)     Difficulty of Paying Living Expenses: Not hard at all   Food Insecurity: No Food Insecurity (8/10/2024)    Hunger Vital Sign     Worried About Running Out of Food in the Last Year: Never true     Ran Out of Food in the Last Year: Never true   Transportation Needs: No Transportation Needs (8/10/2024)    TRANSPORTATION NEEDS     Transportation : No   Physical Activity: Inactive (8/10/2024)    Exercise Vital Sign     Days of Exercise per Week: 0 days     Minutes of Exercise per Session: 0 min   Stress: No Stress Concern Present (8/10/2024)    [employer] - Occupational Stress Questionnaire     Feeling of Stress : Not at all   Housing Stability: Low Risk  (8/10/2024)    Housing Stability Vital Sign     Unable to Pay for Housing  in the Last Year: No     Homeless in the Last Year: No       Past Surgical History:   Procedure Laterality Date    BRONCHOSCOPY WITH FLUOROSCOPY Left 11/20/2023    Procedure: BRONCHOSCOPY, WITH FLUOROSCOPY;  Surgeon: Lisa Villarreal MD;  Location: [facility];  Service: Pulmonary;  Laterality: Left;    BRONCHOSCOPY WITH FLUOROSCOPY N/A 11/30/2023    Procedure: BRONCHOSCOPY, WITH FLUOROSCOPY;  Surgeon: Lisa Villarreal MD;  Location: [facility];  Service: Pulmonary;  Laterality: N/A;    CARDIAC ELECTROPHYSIOLOGY MAPPING AND ABLATION  2017    SVT    CHOLECYSTECTOMY  2011    COLONOSCOPY N/A 5/3/2022    Procedure: COLONOSCOPY;  Surgeon: Shan Jean III, MD;  Location: [facility];  Service: Endoscopy;  Laterality: N/A;    COLONOSCOPY N/A 3/16/2024    Procedure: COLONOSCOPY;  Surgeon: ALEKSANDER Ramos MD;  Location: [facility];  Service: Endoscopy;  Laterality: N/A;    COLONOSCOPY WITH FECAL MICROBIOTA TRANSFER N/A 3/21/2023    Procedure: COLONOSCOPY, WITH FECAL MICROBIOTA TRANSFER;  Surgeon: David Millan MD;  [facility];  Service: Endoscopy;  Laterality: N/A;    ESOPHAGOGASTRODUODENOSCOPY N/A 4/24/2023    Procedure: EGD (ESOPHAGOGASTRODUODENOSCOPY);  Surgeon: Shan Jean III, MD;  Location: [facility];  Service: Endoscopy;  Laterality: N/A;    ESOPHAGOGASTRODUODENOSCOPY N/A 6/5/2024    Procedure: EGD (ESOPHAGOGASTRODUODENOSCOPY);  Surgeon: Odalis Mccabe MD;  Location: [facility];  Service: Endoscopy;  Laterality: N/A;    FLEXIBLE SIGMOIDOSCOPY N/A 6/5/2024    Procedure: SIGMOIDOSCOPY, FLEXIBLE;  Surgeon: Odalis Mccabe MD;  [facility];  Service: Endoscopy;  Laterality: N/A;    FLEXIBLE SIGMOIDOSCOPY N/A 7/16/2024    Procedure: SIGMOIDOSCOPY, FLEXIBLE;  Surgeon: Sahn Jean III, MD;  Location: [facility];  Service: Endoscopy;  Laterality: N/A;    GANGLION CYST EXCISION Left 1992    UMBILICAL HERNIA REPAIR  2011    with mesh       Family History   Problem Relation  Name Age of Onset    Asthma Mother         Review of patient's allergies indicates:  No Known Allergies    Current Facility-Administered Medications on File Prior to Encounter   Medication Dose Route Frequency Provider Last Rate Last Admin    lactated ringers infusion   Intravenous Continuous David Millan MD 75 mL/hr at 23 1252 New Bag at 23 1252     Current Outpatient Medications on File Prior to Encounter   Medication Sig Dispense Refill    budesonide (ENTOCORT EC) 3 mg capsule Take 6 mg by mouth once daily.      busPIRone (BUSPAR) 10 MG tablet Take 1 tablet (10 mg total) by mouth 3 (three) times daily. 270 tablet 1    cholestyramine (QUESTRAN) 4 gram packet Take 1 packet by mouth once daily.      ergocalciferol (VITAMIN D2) 50,000 unit Cap Take 1 capsule (50,000 Units total) by mouth every 7 days. 12 capsule 1    hyoscyamine (LEVBID) 0.375 mg Tb12 Take 375 mcg by mouth 2 (two) times daily.      LORazepam (ATIVAN) 0.5 MG tablet Take 1 tablet (0.5 mg total) by mouth every 12 (twelve) hours as needed for Anxiety. 60 tablet 2    metFORMIN (GLUCOPHAGE-XR) 500 MG ER 24hr tablet Take 1 tablet (500 mg total) by mouth 2 (two) times daily with meals. 180 tablet 1    metoprolol succinate (TOPROL-XL) 50 MG 24 hr tablet Take 1 tablet (50 mg total) by mouth once daily. 90 tablet 1    promethazine (PHENERGAN) 12.5 MG Tab Take 12.5 mg by mouth 4 (four) times daily as needed.      sertraline (ZOLOFT) 100 MG tablet Take 1 tablet (100 mg total) by mouth once daily. 90 tablet 1    simvastatin (ZOCOR) 20 MG tablet Take 1 tablet (20 mg total) by mouth every evening. (Patient taking differently: Take 20 mg by mouth once daily.) 90 tablet 1    sulfamethoxazole-trimethoprim 800-160mg (BACTRIM DS) 800-160 mg Tab Take 1 tablet by mouth 2 (two) times daily.      zolpidem (AMBIEN) 10 mg Tab Take 1 tablet (10 mg total) by mouth nightly as needed (insomnia). 30 tablet 2    [] levoFLOXacin (LEVAQUIN) 750 MG tablet Take 1  "tablet (750 mg total) by mouth once daily. for 7 days 7 tablet 0    lipase-protease-amylase 12,000-38,000-60,000 units (CREON) CpDR Take 3 capsules by mouth 3 (three) times daily with meals. 270 capsule 11    [] metroNIDAZOLE (FLAGYL) 500 MG tablet Take 1 tablet (500 mg total) by mouth 3 (three) times daily. for 7 days 21 tablet 0    semaglutide (OZEMPIC) 2 mg/dose (8 mg/3 mL) PnIj Inject 2 mg into the skin every 7 days. (Patient taking differently: Inject 2 mg into the skin every 7 days. ) 3 mL 5       ROS: As per HPI and below:  Pertinent items are noted in HPI.      Physical Exam:     Vitals:    24 0615 24 0702 24 1105 24 1132   BP:  126/85 (!) 110/57    BP Location:  Left arm Left arm    Patient Position:  Lying Lying    Pulse:  71 74    Resp: 16 18 18 18   Temp:  97.7 °F (36.5 °C) 98.1 °F (36.7 °C)    TempSrc:  Oral Oral    SpO2:  95% 96%    Weight:       Height:           BP  Min: 108/63  Max: 145/72  Temp  Av.9 °F (36.6 °C)  Min: 97.4 °F (36.3 °C)  Max: 98.3 °F (36.8 °C)  Pulse  Av.1  Min: 70  Max: 97  Resp  Av.4  Min: 15  Max: 20  SpO2  Av.7 %  Min: 94 %  Max: 100 %  Height  Av' 5" (165.1 cm)  Min: 5' 5" (165.1 cm)  Max: 5' 5" (165.1 cm)  Weight  Av.5 kg (265 lb 9.6 oz)  Min: 119.7 kg (264 lb)  Max: 121.2 kg (267 lb 3.2 oz)    Body mass index is 44.46 kg/m².          General:             Well developed, well nourished, no apparent distress  HEENT:              NCAT, no JVD, mucous membranes moist, EOM intact  Cardiovascular:  Regular rate and rhythm, normal S1, normal S2, No murmurs, rubs, or gallops  Respiratory:        Normal breath sounds, no wheezes, no rales, no rhonchi  Abdomen:           Bowel sounds present, non tender, non distended, no masses, no hepatojugular reflux  Extremities:        No clubbing, no cyanosis, no edema  Vascular:            2+ b/l radial.  Peripheral pulses intact.  No carotid bruits.  Neurological:      No " focal deficits  Skin:                   No obvious rashes or erythema, left upper arm abscess            Lab Results   Component Value Date    WBC 14.11 (H) 08/12/2024    HGB 12.2 (L) 08/12/2024    HCT 38.3 (L) 08/12/2024    MCV 95 08/12/2024     08/12/2024     Lab Results   Component Value Date    CHOL 142 10/27/2023    CHOL 103 (L) 01/04/2023    CHOL 111 (L) 06/07/2022     Lab Results   Component Value Date    HDL 44 10/27/2023    HDL 41 01/04/2023    HDL 28 (L) 06/07/2022     Lab Results   Component Value Date    LDLCALC 81.4 10/27/2023    LDLCALC 44.8 (L) 01/04/2023    LDLCALC 58.4 (L) 06/07/2022     Lab Results   Component Value Date    TRIG 83 10/27/2023    TRIG 86 01/04/2023    TRIG 123 06/07/2022     Lab Results   Component Value Date    CHOLHDL 31.0 10/27/2023    CHOLHDL 39.8 01/04/2023    CHOLHDL 25.2 06/07/2022     CMP  Recent Labs   Lab 08/12/24  0833   *   CALCIUM 8.3*   ALBUMIN 3.3*   PROT 6.4   *   K 4.4   CO2 22*      BUN 7   CREATININE 1.0   ALKPHOS 54*   ALT 7*   AST 12   BILITOT 0.2      Lab Results   Component Value Date    TSH 1.132 07/15/2024           Assessment and Recommendations        Diagnoses:    Abscess of the left upper arm    Plan:  Continue packing the wound with plain packing strips  FU at the OP clinic on DC    Complexity:    moderate

## 2024-08-12 NOTE — NURSING
"Pt. Seen for Wound Consult.    Pt. With Abscess to the Lt. Medial Upper Extremity.  Also seen by Dr. Jimenes earlier today.      0.8 x 0.6 x 1.1 cm with 12-12:00 Undermining.    Deepest Undermining @ 6:00 and = 2.2 cm.     Cleansed with NS and gauze.  Patted dry with gauze. Using a cotton-tipped applicator, gently packed area with 1/4" packing strip.  Used one piece approx. 2 ft. Long.  (Pkg. had remaining 12" out of 3 ft.) Applied Adhesive Bordered Dressing. Jose. Well.           "

## 2024-08-12 NOTE — PROGRESS NOTES
Pharmacokinetic Initial Assessment: IV Vancomycin    Assessment/Plan:    Initiate intravenous vancomycin with loading dose of 2000 mg once followed by a maintenance dose of vancomycin 2000 mg IV every 12 hours  Desired empiric serum trough concentration is 10 to 15 mcg/mL  Draw vancomycin trough level 60 min prior to fourth dose on 8/14/24 at approximately 04:00 am.  Pharmacy will continue to follow and monitor vancomycin.      Please contact pharmacy at extension [phone] with any questions regarding this assessment.     Thank you for the consult,   Joanna Husain       Patient brief summary:  Jacoby Jean-Baptiste is a 49 y.o. male initiated on antimicrobial therapy with IV Vancomycin for treatment of suspected skin & soft tissue infection    Drug Allergies:   Review of patient's allergies indicates:  No Known Allergies    Actual Body Weight:   121.2 kg    Renal Function:   Estimated Creatinine Clearance: 106.8 mL/min (based on SCr of 1 mg/dL).,     Dialysis Method (if applicable):  N/A    CBC (last 72 hours):  Recent Labs   Lab Result Units 08/10/24  1156 08/11/24  0315 08/12/24  0833   WBC K/uL 13.75* 9.86 14.11*   Hemoglobin g/dL 12.8* 12.2* 12.2*   Hematocrit % 40.8 37.3* 38.3*   Platelets K/uL 263 250 262   Gran % % 79.1* 91.0* 87.0*   Lymph % % 10.3* 6.3* 7.4*   Mono % % 7.1 1.6* 3.9*   Eosinophil % % 1.9 0.0 0.0   Basophil % % 0.7 0.2 0.4   Differential Method  Automated Automated Automated       Metabolic Panel (last 72 hours):  Recent Labs   Lab Result Units 08/10/24  0936 08/11/24  0315 08/12/24  0833   Sodium mmol/L 133* 133* 135*   Potassium mmol/L 4.2 4.7 4.4   Chloride mmol/L 106 105 105   CO2 mmol/L 17* 18* 22*   Glucose mg/dL 54* 122* 129*   BUN mg/dL 7 5* 7   Creatinine mg/dL 1.4 1.0 1.0   Albumin g/dL 3.2* 3.0* 3.3*   Total Bilirubin mg/dL 0.3 0.2 0.2   Alkaline Phosphatase U/L 65 59 54*   AST U/L 11 8* 12   ALT U/L 5* 5* 7*   Magnesium mg/dL 1.0* 1.4* 1.5*       Drug levels (last 3 results):  No results  "for input(s): "VANCOMYCINRA", "VANCORANDOM", "VANCOMYCINPE", "VANCOPEAK", "VANCOMYCINTR", "VANCOTROUGH" in the last 72 hours.    Microbiologic Results:  Microbiology Results (last 7 days)       Procedure Component Value Units Date/Time    Blood culture x two cultures. Draw prior to antibiotics. [[id number]] Collected: 08/09/24 1409    Order Status: Completed Specimen: Blood Updated: 08/12/24 1632     Blood Culture, Routine No Growth to date      No Growth to date      No Growth to date      No Growth to date    Narrative:      Aerobic and anaerobic    Blood culture x two cultures. Draw prior to antibiotics. [[id number]] Collected: 08/09/24 1544    Order Status: Completed Specimen: Blood Updated: 08/12/24 1632     Blood Culture, Routine No Growth to date      No Growth to date      No Growth to date      No Growth to date    Narrative:      Aerobic and anaerobic  Collection has been rescheduled by CZB at 08/09/2024 15:00 Reason:   Unable to collect  Collection has been rescheduled by CZB at 08/09/2024 15:00 Reason:   Unable to collect    Culture, Anaerobe [[id number]] Collected: 08/12/24 1237    Order Status: Sent Specimen: Abscess from Arm, Left Updated: 08/12/24 1240    Aerobic culture [[id number]] Collected: 08/12/24 1237    Order Status: Sent Specimen: Abscess from Arm, Left Updated: 08/12/24 1240    Stool culture [[id number]] Collected: 08/10/24 1210    Order Status: Completed Specimen: Stool Updated: 08/12/24 0849     Stool Culture No Salmonella,Shigella,Vibrio,Campylobacter.      No E coli 0157:H7 isolated.    MRSA Screen by PCR [[id number]] Collected: 08/10/24 1650    Order Status: Completed Specimen: Nasal Swab Updated: 08/10/24 1829     MRSA SCREEN BY PCR Negative    Clostridium difficile EIA [[id number]] Collected: 08/09/24 1439    Order Status: Completed Specimen: Stool Updated: 08/09/24 1728     C. diff Antigen Negative     C difficile Toxins A+B, EIA Negative     Comment: Testing not recommended for " children <24 months old.

## 2024-08-12 NOTE — ASSESSMENT & PLAN NOTE
Stopped p.o. Bactrim  Keep site clean  Consult wound care nurse  Abx per ID-IV rocephin currently-also on IV flagyl and oral vancomycin  WBC have trended down-awaiting today's lab

## 2024-08-12 NOTE — NURSING
Report received from Evelin COX. Awaiting pt arrival. Mag to be replaced before transfer per Evelin [facility].

## 2024-08-12 NOTE — ASSESSMENT & PLAN NOTE
Flare suspected  Pain control  Antiemetics as needed  Now on solumedrol 30mg q12h  GI consulted-possible endoscopy tomorrow.  Seems to be improving.

## 2024-08-12 NOTE — SUBJECTIVE & OBJECTIVE
Interval History: no acute events overnight.  Pain well managed per his report.  Nausea is per his baseline. Has had 4 diarrhea stools already this morning.  Abscess to TAIWO was packed yesterday.    Review of Systems   Constitutional:  Negative for chills and fever.   Gastrointestinal:  Positive for diarrhea and nausea.     Objective:     Vital Signs (Most Recent):  Temp: 97.7 °F (36.5 °C) (08/12/24 0702)  Pulse: 71 (08/12/24 0702)  Resp: 18 (08/12/24 0702)  BP: 126/85 (08/12/24 0702)  SpO2: 95 % (08/12/24 0702) Vital Signs (24h Range):  Temp:  [97.4 °F (36.3 °C)-98 °F (36.7 °C)] 97.7 °F (36.5 °C)  Pulse:  [70-78] 71  Resp:  [15-20] 18  SpO2:  [95 %-98 %] 95 %  BP: (111-140)/(72-85) 126/85     Weight: 121.2 kg (267 lb 3.2 oz)  Body mass index is 44.46 kg/m².    Intake/Output Summary (Last 24 hours) at 8/12/2024 0806  Last data filed at 8/11/2024 2332  Gross per 24 hour   Intake 600 ml   Output --   Net 600 ml         Physical Exam  Vitals reviewed.   Constitutional:       General: He is awake.      Appearance: Normal appearance. He is obese.   HENT:      Head: Normocephalic and atraumatic.      Nose: No congestion.      Mouth/Throat:      Mouth: Mucous membranes are moist.      Pharynx: Oropharynx is clear.   Eyes:      Extraocular Movements: Extraocular movements intact.      Pupils: Pupils are equal, round, and reactive to light.   Cardiovascular:      Rate and Rhythm: Normal rate and regular rhythm.      Pulses: Normal pulses.      Heart sounds: Normal heart sounds.   Pulmonary:      Effort: Pulmonary effort is normal. No respiratory distress.      Breath sounds: Normal breath sounds.   Abdominal:      General: Bowel sounds are normal.      Palpations: Abdomen is soft.      Tenderness: There is abdominal tenderness.   Musculoskeletal:         General: Normal range of motion.      Cervical back: Normal range of motion and neck supple.   Skin:     General: Skin is warm and dry.      Capillary Refill: Capillary  refill takes less than 2 seconds.      Findings: Wound present.             Comments: Abscess to LUE s/p I&D, dressing intact; surrounding erythema to site   Neurological:      General: No focal deficit present.      Mental Status: He is alert and oriented to person, place, and time. Mental status is at baseline.   Psychiatric:         Mood and Affect: Mood normal.         Behavior: Behavior normal. Behavior is cooperative.         Judgment: Judgment normal.             Significant Labs: All pertinent labs within the past 24 hours have been reviewed.  CBC:   Recent Labs   Lab 08/10/24  1156 08/11/24  0315 08/12/24  0833   WBC 13.75* 9.86 14.11*   HGB 12.8* 12.2* 12.2*   HCT 40.8 37.3* 38.3*    250 262     CMP:   Recent Labs   Lab 08/11/24  0315 08/12/24  0833   * 135*   K 4.7 4.4    105   CO2 18* 22*   * 129*   BUN 5* 7   CREATININE 1.0 1.0   CALCIUM 7.9* 8.3*   PROT 6.2 6.4   ALBUMIN 3.0* 3.3*   BILITOT 0.2 0.2   ALKPHOS 59 54*   AST 8* 12   ALT 5* 7*   ANIONGAP 10 8       Significant Imaging: I have reviewed all pertinent imaging results/findings within the past 24 hours.

## 2024-08-12 NOTE — NURSING
Called to give report for patient transfer to 112, was told no nurse available at that time to receive report. Will try back

## 2024-08-12 NOTE — PROGRESS NOTES
[facility]  Progress Note    Patient Name: Jacoby Jean-Baptiste  MRN: [id number]  Patient Class: IP- Inpatient   Admission Date: 8/9/2024  Length of Stay: 2 days  Attending Physician: Eloisa Holland MD  Primary Care Provider: Hunter Aguilar III, MD        Subjective:     Principal Problem:Sepsis        HPI:  50 y/o male with pMHx of 2 DM, C difficile colitis, ulcerative colitis, cavitary pneumonia, HLD, and hypertension who presented to the ED with complaints of vomiting and diarrhea x1 week.  Patient was seen in the ED on 08/05/2024 with similar symptoms and went home to take his Levaquin and Flagyl that was previously prescribed and did not fill the prescriptions.  Today he made decision to return to the ED with worsening symptoms.  Patient is also with left upper arm site of previous I and D of abscess on oral Bactrim.  We will stop Bactrim and consult wound care for topical treatment as patient is on systemic antibiotics with no change.  On arrival patient with a WBCs 17.2, sodium 134, CO2 20, creatinine 2.0, lactic 1.69.  CTA abdomen and pelvis with IV contrast redemonstration of findings suggesting a nonspecific proctocolitis.  Patient was given 3 L of fluid in the ED he was tested for C difficile and found to be negative for antigens and toxins.  Patient will be admitted for IV antibiotic treatment.    Overview/Hospital Course:  Jacoby Jean-Baptiste was admitted to the service of hospital medicine for further treatment of cellulitis/abscess to LUE and suspected UC flare.  He was given IVFH and started on IV abx.  GI and ID were consulted.  Abx managed by ID.  Pain was controlled with IV and oral narcotics.  He required IV antiemetics.  Nausea improved to his baseline.  Diet was advanced as tolerated.    Interval History: no acute events overnight.  Pain well managed per his report.  Nausea is per his baseline. Has had 4 diarrhea stools already this morning.  Abscess to LUE was packed  yesterday.    Review of Systems   Constitutional:  Negative for chills and fever.   Gastrointestinal:  Positive for diarrhea and nausea.     Objective:     Vital Signs (Most Recent):  Temp: 97.7 °F (36.5 °C) (08/12/24 0702)  Pulse: 71 (08/12/24 0702)  Resp: 18 (08/12/24 0702)  BP: 126/85 (08/12/24 0702)  SpO2: 95 % (08/12/24 0702) Vital Signs (24h Range):  Temp:  [97.4 °F (36.3 °C)-98 °F (36.7 °C)] 97.7 °F (36.5 °C)  Pulse:  [70-78] 71  Resp:  [15-20] 18  SpO2:  [95 %-98 %] 95 %  BP: (111-140)/(72-85) 126/85     Weight: 121.2 kg (267 lb 3.2 oz)  Body mass index is 44.46 kg/m².    Intake/Output Summary (Last 24 hours) at 8/12/2024 0806  Last data filed at 8/11/2024 2332  Gross per 24 hour   Intake 600 ml   Output --   Net 600 ml         Physical Exam  Vitals reviewed.   Constitutional:       General: He is awake.      Appearance: Normal appearance. He is obese.   HENT:      Head: Normocephalic and atraumatic.      Nose: No congestion.      Mouth/Throat:      Mouth: Mucous membranes are moist.      Pharynx: Oropharynx is clear.   Eyes:      Extraocular Movements: Extraocular movements intact.      Pupils: Pupils are equal, round, and reactive to light.   Cardiovascular:      Rate and Rhythm: Normal rate and regular rhythm.      Pulses: Normal pulses.      Heart sounds: Normal heart sounds.   Pulmonary:      Effort: Pulmonary effort is normal. No respiratory distress.      Breath sounds: Normal breath sounds.   Abdominal:      General: Bowel sounds are normal.      Palpations: Abdomen is soft.      Tenderness: There is abdominal tenderness.   Musculoskeletal:         General: Normal range of motion.      Cervical back: Normal range of motion and neck supple.   Skin:     General: Skin is warm and dry.      Capillary Refill: Capillary refill takes less than 2 seconds.      Findings: Wound present.             Comments: Abscess to LUE s/p I&D, dressing intact; surrounding erythema to site   Neurological:      General: No  focal deficit present.      Mental Status: He is alert and oriented to person, place, and time. Mental status is at baseline.   Psychiatric:         Mood and Affect: Mood normal.         Behavior: Behavior normal. Behavior is cooperative.         Judgment: Judgment normal.             Significant Labs: All pertinent labs within the past 24 hours have been reviewed.  CBC:   Recent Labs   Lab 08/10/24  1156 08/11/24  0315 08/12/24  0833   WBC 13.75* 9.86 14.11*   HGB 12.8* 12.2* 12.2*   HCT 40.8 37.3* 38.3*    250 262     CMP:   Recent Labs   Lab 08/11/24  0315 08/12/24  0833   * 135*   K 4.7 4.4    105   CO2 18* 22*   * 129*   BUN 5* 7   CREATININE 1.0 1.0   CALCIUM 7.9* 8.3*   PROT 6.2 6.4   ALBUMIN 3.0* 3.3*   BILITOT 0.2 0.2   ALKPHOS 59 54*   AST 8* 12   ALT 5* 7*   ANIONGAP 10 8       Significant Imaging: I have reviewed all pertinent imaging results/findings within the past 24 hours.    Assessment/Plan:      * Sepsis  C diff checked was negative  This patient does have evidence of infective focus  My overall impression is sepsis.  Source: Abdominal and Skin and Soft Tissue (location left upper arm)  Antibiotics given-   Antibiotics (72h ago, onward)      Start     Stop Route Frequency Ordered    08/11/24 1300  metronidazole IVPB 500 mg         -- IV Every 8 hours (non-standard times) 08/11/24 1228    08/10/24 2100  vancomycin 125 mg/5 mL oral solution 250 mg         -- Oral 2 times daily 08/10/24 1447    08/10/24 1600  cefTRIAXone (ROCEPHIN) 2 g in dextrose 5 % 100 mL IVPB (ready to mix)         -- IV Every 24 hours (non-standard times) 08/10/24 1453          Latest lactate reviewed-  Recent Labs   Lab 08/09/24  1435 08/10/24  1928   LACTATE  --  1.2   POCLAC 1.69  --        Organ dysfunction indicated by Acute kidney injury    Fluid challenge Ideal Body Weight- The patient's ideal body weight is Ideal body weight: 61.5 kg (135 lb 9.3 oz) which will be used to calculate fluid bolus of  "30 ml/kg for treatment of septic shock.  Lactic acid 1.69 patient given 3 L of fluid in ED    Post- resuscitation assessment No - Post resuscitation assessment not needed     Source control achieved by:  3 L IV fluid, antibiotics IV    Skin abscess  Stopped p.o. Bactrim  Keep site clean  Consult wound care nurse  Abx per ID-IV rocephin currently-also on IV flagyl and oral vancomycin  WBC have trended down-awaiting today's lab      Acute renal failure  NAZIA is likely due to pre-renal azotemia due to dehydration. Baseline creatinine is  1.2 . Most recent creatinine and eGFR are listed below.  Recent Labs     08/09/24  1151 08/10/24  0936 08/11/24  0315   CREATININE 2.0* 1.4 1.0   EGFRNORACEVR 39.9* >60.0 >60.0        Plan  - NAZIA has resolved. Continue to monitor renal function.  Labs not collected this morning for some reason.  Re-ordered and awaiting results.    Proctocolitis  Patient is started on IV Levaquin and IV Flagyl-now on rocephin and flagyl.  Solumedrol 30mg q12h  UC flare suspected-GI consulted  May have endoscopy Tuesday  Currently seems to be tolerating regular diet          Ulcerative colitis  Flare suspected  Pain control  Antiemetics as needed  Now on solumedrol 30mg q12h  GI consulted-possible endoscopy tomorrow.  Seems to be improving.      Type 2 diabetes mellitus without complication, without long-term current use of insulin  Patient's FSGs are controlled on current medication regimen.  Last A1c reviewed-   Lab Results   Component Value Date    HGBA1C 5.2 06/06/2024     Most recent fingerstick glucose reviewed- No results for input(s): "POCTGLUCOSE" in the last 24 hours.  Current correctional scale  Low  Maintain anti-hyperglycemic dose as follows-   Antihyperglycemics (From admission, onward)      Start     Stop Route Frequency Ordered    08/09/24 2137  insulin aspart U-100 pen 0-5 Units         -- SubQ Before meals & nightly PRN 08/09/24 2038          Hold Oral hypoglycemics while patient is in " the hospital.  Hold metformin, Ozempic-- start low dose SSI    Morbid obesity  Body mass index is 43.93 kg/m². Morbid obesity complicates all aspects of disease management from diagnostic modalities to treatment. Weight loss encouraged and health benefits explained to patient.           VTE Risk Mitigation (From admission, onward)           Ordered     Reason for No Pharmacological VTE Prophylaxis  Once        Question:  Reasons:  Answer:  Risk of Bleeding  Comment:  ulcerative colitis patient    08/09/24 2038     IP VTE HIGH RISK PATIENT  Once         08/09/24 2038     Place sequential compression device  Until discontinued         08/09/24 2038                    Discharge Planning   GERARDO: 8/14/2024     Code Status: Full Code   Is the patient medically ready for discharge?:     Reason for patient still in hospital (select all that apply): Patient trending condition, Treatment, and Consult recommendations  Discharge Plan A: Home with family          JAMIA Saini  Department of Hospital Medicine   [facility]

## 2024-08-12 NOTE — ASSESSMENT & PLAN NOTE
NAZIA is likely due to pre-renal azotemia due to dehydration. Baseline creatinine is  1.2 . Most recent creatinine and eGFR are listed below.  Recent Labs     08/09/24  1151 08/10/24  0936 08/11/24  0315   CREATININE 2.0* 1.4 1.0   EGFRNORACEVR 39.9* >60.0 >60.0        Plan  - NAZIA has resolved. Continue to monitor renal function.  Labs not collected this morning for some reason.  Re-ordered and awaiting results.

## 2024-08-12 NOTE — PROGRESS NOTES
[facility]   Department of Infectious Disease  Progress Note        PATIENT NAME: Jacoby Jean-Baptiste  MRN: [id number]  TODAY'S DATE: 08/12/2024  ADMIT DATE: 8/9/2024  LOS: 2 days    CHIEF COMPLAINT: Vomiting and Diarrhea (X 1 week)    PRINCIPLE PROBLEM: Sepsis    REASON FOR CONSULT: Colitis and cellulitis, plz advise on abx     INTERVAL HISTORY     8/11:  Patient seen and examined, he is lying in bed, awake, alert, looks a little more comfortable today now that he is tolerating diet.   Unfortunately, continues to have multiple episodes of loose bowel movements, 10 in the last 24 hours, high suspicion for colitis flare.  Discussed with GI, no contraindication to start steroids to see if we can help his UC flare.  Plan for colonoscopy earlier next week.  Patient mentions his left arm is better, less tender, erythema is improving.  Noted to have an open wound with adipose tissue seen, no evidence of purulent drainage noted, will ask the nurse to pack it for now.  Ultrasound left arm no solid or cystic mass.  Superficial hypoechoic focus measuring up to 6 mm, possibly reflecting edema or scaring.  Hemodynamically stable, afebrile.  Adequate urinary output.  Micro reviewed, stool for C diff negative, GI PCR nothing detected, stool culture nothing significant to date.  Blood cultures x2 sets no growth to date, pending final.  Labs reviewed, leukocytosis improved 9.8, left shift 91%, H&H 12.2/37.3, platelet count 250.  Hyponatremia 133, potassium 4.7, NAZIA resolved creatinine 1, AST 8/ALT 5.  Procalcitonin is negative 0.1, CRP 4.2, slightly high likely from underlying UC flare.    8/12/24@1226 (Denette):  He is sitting up in bed awake and alert.  He continues with mild diffuse abdominal pain that he says he was all the time.   Left upper arm is tender to palpation. He had 7 diarrhea stools in the last 24 hours.  He has not noticed any blood in the stool.  He was eating and drinking well with no vomiting since  "Friday.  We examined wound in his left arm and erythema is much better there is some induration around incision sites and packing was removed.  Nurses going to replace packing and re-dressed the arm.   He denies fevers or chills, dysuria, headaches or dizziness. A culture was obtained from the wound.  T-max 98° in the last 24 hours.  WBC 14.11 increased, left shift 87%, no bands, creatinine 1.0, creatinine clearance 106, MRSA screen negative, stool culture negative, blood cultures no growth to date, stool for C diff negative.      Antibiotics (From admission, onward)      Start     Stop Route Frequency Ordered    08/12/24 2100  vancomycin 125 mg/5 mL oral solution 125 mg         -- Oral 2 times daily 08/12/24 1513    08/12/24 1558  vancomycin - pharmacy to dose  (vancomycin IVPB (PEDS and ADULTS))        Placed in "And" Linked Group    -- IV pharmacy to manage frequency 08/12/24 1513    08/11/24 1300  metronidazole IVPB 500 mg         -- IV Every 8 hours (non-standard times) 08/11/24 1228    08/10/24 1600  cefTRIAXone (ROCEPHIN) 2 g in dextrose 5 % 100 mL IVPB (ready to mix)         -- IV Every 24 hours (non-standard times) 08/10/24 1453           Antifungals (From admission, onward)      None           Antivirals (From admission, onward)      None               ASSESSMENT and PLAN     Left arm SSTi, rule out abscess    Blood cultures x2 sets no growth to date, pending final  Soft tissue US left arm: Superficial hypoechoic focus measuring up to 6 mm is present near this region, possibly reflecting edema, infected fluid, or scarring   CRP 4.2, slightly high   MRSA nasal swab negative    Diarrhea, history of ulcerative colitis on Omvoh (Mirikizumab) last dose - rule out UC flare  Calprotectin: >8000-->2480-->1100  Recent colonoscopy 7/16: CHRONIC COLITIS WITH MILD SUPERFICIAL ACUTE INFLAMMATORY CHANGES.   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, diabetes, HTN, HLD, anemia, anxiety/insomnia, hepatitis-B positive serology, GERD       RECOMMENDATIONS:   Continue vancomycin IV, keep level around 15, pharmacy to dose for left arm SSTi  Continue Rocephin 2 g IV daily  Continue Flagyl 500 mg IV q.8  Continue oral vancomycin  125 mg twice daily for C diff prophylaxis  No contraindications from ID standpoint to give steroids for UC flare  Plan for colonoscopy early next week  Continue wound care with packing  Obtain anaerobic and aerobic culture of left arm wound today    D/W Dr Chaves    Please send [employer] secure chat with any questions.    HPI      Jacoby Jean-Baptiste is a 49 y.o. male morbidly obese, BMI 44.4 kg/m2, known to our service from prior admission in December with cavitary/atypical pneumonia with positive Aspergillus antigen status post voriconazole.  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  He mentions since July 20th he started noticing redness on his left upper arm.  He was seen by surgery on 08/01 status post I&D, unfortunately no cultures were sent.  Upon chart reviewed, he had a recent prescription for levofloxacin and Flagyl which he did not  at the pharmacy.    Patient is complaining of headache, generalized weakness, fatigue, multiple, nonbloody, frequent loose bowel movements, denies any urinary symptoms.  Labs yesterday with leukocytosis 17.2, left shift 78%, H&H 14.3/43.2, platelet count 296   Hyponatremia 134, potassium 4   NAZIA, creatinine 2, down to 1.4 today, LFTs normal   UA 1+ leukocyte esterase, hyaline casts, negative for UTI   Stool for C diff  negative     Review of patient's allergies indicates:  No Known Allergies     Antibiotic history:    Vancomycin 8/9   Levofloxacin 8/9   Piperacillin tazobactam 7/9   Cefepime 8/9-10  Metronidazole 8/9  Rocephin 8/10    Microbiology:    Stool for C diff 8/9 negative   Stool culture 8/10 negative  Blood cultures 8/9 x2 sets no growth to date, pending final    SUBJECTIVE     Review of systems  Review of systems obtained and negative except as stated above in Interval History     OBJECTIVE   Temp:  [97.4 °F (36.3 °C)-98.1 °F (36.7 °C)] 98.1 °F (36.7 °C)  Pulse:  [70-78] 74  Resp:  [15-20] 18  SpO2:  [95 %-98 %] 96 %  BP: (110-140)/(57-85) 110/57  Temp:  [97.4 °F (36.3 °C)-98.1 °F (36.7 °C)]   Temp: 98.1 °F (36.7 °C) (08/12/24 1105)  Pulse: 74 (08/12/24 1105)  Resp: 18 (08/12/24 1132)  BP: (!) 110/57 (08/12/24 1105)  SpO2: 96 % (08/12/24 1105)    Intake/Output Summary (Last 24 hours) at 8/12/2024 1444  Last data filed at 8/12/2024 1153  Gross per 24 hour   Intake 130 ml   Output --   Net 130 ml       Physical Exam  General:   Sitting up in bed awake and alert, no distress noted.  Eyes: Eyes with no icterus or injection. Vision grossly normal  Ears: Hearing grossly normal.  Nose: Nares patent  Mouth: Moist mucous membranes, dentition poor. No ulcerations, erythema or exudates.  Cardiovascular: Regular rate and rhythm, no murmurs, no edema.    Respiratory:  Clear to auscultation bilaterally, no tachypnea or increased work of breathing.  Gastrointestinal:  Soft and obese with active bowel sounds, + tenderness to palpation, no distention.  Musculoskeletal:  Moves all extremities with good strength.    Skin:  Warm and dry, Left arm with resolving erythema, 2 small wounds noted, no evidence of pus, adipose tissue seen consistent with resolving cellulitis, nontender to palpation -, needs to be packed  8/10: left arm with mild cellulitis, indurated area appreciated approaching the axilla, tender to palpation, no evidence of  "drainage noted consistent with cellulitis  8/12   No erythema, 2 small wounds, no drainage but packing removed and it does appear purulent, cultures obtained.  Neuro:   Oriented, conversant, follows commands.  Psych: Good mood, normal affect.   VAD:  Midline placed 08/11/2024 to right basilic  ISOLATION: None    Wounds:   8/12/24        8/11/24      8/10:      7/20:      Significant Labs: All pertinent labs within the past 24 hours have been reviewed.    CBC LAST 7  Recent Labs   Lab 08/09/24  1151 08/10/24  1156 08/11/24 0315 08/12/24  0833   WBC 17.28* 13.75* 9.86 14.11*   RBC 4.61 4.20* 3.99* 4.05*   HGB 14.3 12.8* 12.2* 12.2*   HCT 43.2 40.8 37.3* 38.3*   MCV 94 97 94 95   MCH 31.0 30.5 30.6 30.1   MCHC 33.1 31.4* 32.7 31.9*   RDW 14.6* 14.8* 14.4 14.6*    263 250 262   MPV 10.1 9.7 10.1 9.8   GRAN 78.0*  13.5* 79.1*  10.9* 91.0*  9.0* 87.0*  12.3*   LYMPH 11.2*  1.9 10.3*  1.4 6.3*  0.6* 7.4*  1.0   MONO 7.6  1.3* 7.1  1.0 1.6*  0.2* 3.9*  0.6   BASO 0.10 0.10 0.02 0.06   NRBC 0 0 0 0       CHEMISTRY LAST 7  Recent Labs   Lab 08/09/24  1151 08/10/24  0936 08/11/24 0315 08/12/24  0833   * 133* 133* 135*   K 4.0 4.2 4.7 4.4    106 105 105   CO2 20* 17* 18* 22*   ANIONGAP 11 10 10 8   BUN 10 7 5* 7   CREATININE 2.0* 1.4 1.0 1.0   GLU 82 54* 122* 129*   CALCIUM 8.8 7.9* 7.9* 8.3*   MG  --  1.0* 1.4* 1.5*   ALBUMIN 3.6 3.2* 3.0* 3.3*   PROT 7.3 6.3 6.2 6.4   ALKPHOS 77 65 59 54*   ALT 6* 5* 5* 7*   AST 11 11 8* 12   BILITOT 0.3 0.3 0.2 0.2       Estimated Creatinine Clearance: 106.8 mL/min (based on SCr of 1 mg/dL).    INFLAMMATORY/PROCAL  LAST 7  Recent Labs   Lab 08/11/24  0315   PROCAL 0.166   CRP 4.20*     No results found for: "ESR"  CRP   Date Value Ref Range Status   08/11/2024 4.20 (H) <1.00 mg/dL Final     Comment:     CRP-Normal Application expected values:   <1.0        mg/dL   Normal Range  1.0 - 5.0  mg/dL   Indicates mild inflammation  5.0 - 10.0 mg/dL   Indicates " severe inflammation  >10.0        mg/dL   Represents serious processes and   frequently         indicates the presence of a bacterial   infection.      07/15/2024 1.80 (H) <1.00 mg/dL Final     Comment:     CRP-Normal Application expected values:   <1.0        mg/dL   Normal Range  1.0 - 5.0  mg/dL   Indicates mild inflammation  5.0 - 10.0 mg/dL   Indicates severe inflammation  >10.0        mg/dL   Represents serious processes and   frequently         indicates the presence of a bacterial   infection.      07/12/2024 1.30 (H) <1.00 mg/dL Final     Comment:     CRP-Normal Application expected values:   <1.0        mg/dL   Normal Range  1.0 - 5.0  mg/dL   Indicates mild inflammation  5.0 - 10.0 mg/dL   Indicates severe inflammation  >10.0        mg/dL   Represents serious processes and   frequently         indicates the presence of a bacterial   infection.      06/03/2024 6.90 (H) <1.00 mg/dL Final     Comment:     CRP-Normal Application expected values:   <1.0        mg/dL   Normal Range  1.0 - 5.0  mg/dL   Indicates mild inflammation  5.0 - 10.0 mg/dL   Indicates severe inflammation  >10.0        mg/dL   Represents serious processes and   frequently         indicates the presence of a bacterial   infection.      11/30/2023 61.6 (H) 0.0 - 8.2 mg/L Final   11/22/2023 187.6 (H) 0.0 - 8.2 mg/L Final   11/20/2023 265.5 (H) 0.0 - 8.2 mg/L Final       PRIOR  MICROBIOLOGY:    Susceptibility data from last 90 days.  Collected Specimen Info Organism   07/28/24 Blood from Peripheral, Antecubital, Right No growth after 5 days.   07/28/24 Blood from Peripheral, Antecubital, Left No growth after 5 days.   07/15/24 Blood from Peripheral, Antecubital, Left No growth after 5 days.   07/15/24 Blood from Peripheral, Antecubital, Right No growth after 5 days.         LAST 7 DAYS MICROBIOLOGY   Microbiology Results (last 7 days)       Procedure Component Value Units Date/Time    Culture, Anaerobe [[id number]] Collected: 08/12/24 1231     Order Status: Sent Specimen: Abscess from Arm, Left Updated: 08/12/24 1240    Aerobic culture [[id number]] Collected: 08/12/24 1237    Order Status: Sent Specimen: Abscess from Arm, Left Updated: 08/12/24 1240    Stool culture [[id number]] Collected: 08/10/24 1210    Order Status: Completed Specimen: Stool Updated: 08/12/24 0849     Stool Culture No Salmonella,Shigella,Vibrio,Campylobacter.      No E coli 0157:H7 isolated.    Blood culture x two cultures. Draw prior to antibiotics. [[id number]] Collected: 08/09/24 1409    Order Status: Completed Specimen: Blood Updated: 08/11/24 1632     Blood Culture, Routine No Growth to date      No Growth to date      No Growth to date    Narrative:      Aerobic and anaerobic    Blood culture x two cultures. Draw prior to antibiotics. [[id number]] Collected: 08/09/24 1544    Order Status: Completed Specimen: Blood Updated: 08/11/24 1632     Blood Culture, Routine No Growth to date      No Growth to date      No Growth to date    Narrative:      Aerobic and anaerobic  Collection has been rescheduled by CZB at 08/09/2024 15:00 Reason:   Unable to collect  Collection has been rescheduled by CZB at 08/09/2024 15:00 Reason:   Unable to collect    MRSA Screen by PCR [[id number]] Collected: 08/10/24 1650    Order Status: Completed Specimen: Nasal Swab Updated: 08/10/24 1829     MRSA SCREEN BY PCR Negative    Clostridium difficile EIA [[id number]] Collected: 08/09/24 1439    Order Status: Completed Specimen: Stool Updated: 08/09/24 1728     C. diff Antigen Negative     C difficile Toxins A+B, EIA Negative     Comment: Testing not recommended for children <24 months old.                 CURRENT/PREVIOUS VISIT EKG  Results for orders placed or performed during the hospital encounter of 08/09/24   EKG 12-lead    Collection Time: 08/09/24 12:52 PM   Result Value Ref Range    QRS Duration 78 ms    [facility] QTC Calculation 433 ms    Narrative    Test Reason : R11.10,    Vent. Rate : 078 BPM      Atrial Rate : 078 BPM     P-R Int : 128 ms          QRS Dur : 078 ms      QT Int : 380 ms       P-R-T Axes : 009 022 022 degrees     QTc Int : 433 ms    Normal sinus rhythm  Normal ECG  Confirmed by Del RENTERIA, Josh Reddy ([id number]) on 8/9/2024 9:06:15 PM    Referred By: LILLIANAERR   SELF           Confirmed By:Josh Mathis MD       Pathology:  7/16/2024:  07/17/2024 RDC/tml     1. RIGHT COLON BIOPSIES:   - MILD CHRONIC INFLAMMATORY CHANGES.   - NEGATIVE FOR ACUTE INFLAMMATORY REACTION.   - NEGATIVE FOR GRANULOMAS.   - WELL-CONTROLLED CMV IMMUNOHISTOCHEMICAL STAIN IS NEGATIVE.   - NEGATIVE FOR DYSPLASIA OR MALIGNANCY.     2. LEFT COLON BIOPSIES:   - CHRONIC COLITIS WITH MILD SUPERFICIAL ACUTE INFLAMMATORY CHANGES.   - WELL-CONTROLLED CMV IMMUNOHISTOCHEMICAL STAIN IS NEGATIVE.   - NEGATIVE FOR GRANULOMAS.   - NEGATIVE FOR DYSPLASIA OR MALIGNANCY.        Significant Imaging: I have reviewed all relevant and available imaging results/findings within the past 24 hours.    CXR:   No acute cardiopulmonary disease.    CT abdomen/pelvis:  Impression:  Redemonstration of findings suggesting a nonspecific proctocolitis including infectious or inflammatory etiologies.  Overall extent appears mildly increased in length/extent compared to prior examination of 08/04/2024.  No evidence of abscess or free intraperitoneal air. Stable appearing 0.4 cm calcification within the proximal right ureter without evidence of hydronephrosis.  Nonobstructing left-sided nephrolithiasis.     Soft tissue US:  8/10  FINDINGS:  Targeted ultrasound at site of clinical concern of medial left upper extremity, at site of erythema, shows no solid or cystic mass.  Superficial hypoechoic focus measuring up to 6 mm is present near this region, possibly reflecting edema, infected fluid, or scarring.      I spent a total of 55 minutes on the day of the visit. This includes face to face time and non-face to face time preparing to see the patient (eg, review  of tests), obtaining and/or reviewing separately obtained history, documenting clinical information in the electronic or other health record, independently interpreting results and communicating results to the patient/family/caregiver, or care coordinator.      Jessica Hammond NP  Date of Service: 08/12/2024      This note was created using [employer] voice recognition software that occasionally misinterpreted phrases or words.

## 2024-08-12 NOTE — PROGRESS NOTES
Nurses Note -- 4 Eyes      8/12/2024   4:22 PM      Skin assessed during: Transfer      [] No Altered Skin Integrity Present    []Prevention Measures Documented      [x] Yes- Altered Skin Integrity Present or Discovered   [] LDA Added if Not in Epic (Describe Wound)   [] New Altered Skin Integrity was Present on Admit and Documented in LDA   [] Wound Image Taken    Wound Care Consulted? Yes    Attending Nurse:  Swathi Wang RN/Staff Member:  Anthony

## 2024-08-12 NOTE — HOSPITAL COURSE
Jacoby Jean-Baptiste was admitted to the service of hospital medicine for treatment of cellulitis/abscess to LUE and suspected UC flare.  He was given IVFH and started on IV abx.  GI and ID were consulted.  Abx managed by ID.  Pain was controlled with IV and oral narcotics.  He required IV antiemetics.  Nausea improved to his baseline.  Diet was advanced as tolerated.  Patient was discharged with doxycycline per ID recommendation and discharge with prednisone in addition to daily budesonide per GI recommendation.  Patient verbalized understanding to follow up with providers including GI, wound and PCP

## 2024-08-13 ENCOUNTER — ANESTHESIA EVENT (OUTPATIENT)
Dept: SURGERY | Facility: HOSPITAL | Age: 49
DRG: 872 | End: 2024-08-13
Payer: COMMERCIAL

## 2024-08-13 ENCOUNTER — ANESTHESIA (OUTPATIENT)
Dept: SURGERY | Facility: HOSPITAL | Age: 49
DRG: 872 | End: 2024-08-13
Payer: COMMERCIAL

## 2024-08-13 VITALS
RESPIRATION RATE: 11 BRPM | DIASTOLIC BLOOD PRESSURE: 71 MMHG | HEART RATE: 64 BPM | SYSTOLIC BLOOD PRESSURE: 125 MMHG | OXYGEN SATURATION: 100 %

## 2024-08-13 LAB
ALBUMIN SERPL BCP-MCNC: 3.1 G/DL (ref 3.5–5.2)
ALP SERPL-CCNC: 48 U/L (ref 55–135)
ALT SERPL W/O P-5'-P-CCNC: 10 U/L (ref 10–44)
ANION GAP SERPL CALC-SCNC: 8 MMOL/L (ref 8–16)
AST SERPL-CCNC: 15 U/L (ref 10–40)
BASOPHILS # BLD AUTO: 0.04 K/UL (ref 0–0.2)
BASOPHILS NFR BLD: 0.3 % (ref 0–1.9)
BILIRUB SERPL-MCNC: 0.2 MG/DL (ref 0.1–1)
BUN SERPL-MCNC: 11 MG/DL (ref 6–20)
CALCIUM SERPL-MCNC: 8.3 MG/DL (ref 8.7–10.5)
CHLORIDE SERPL-SCNC: 105 MMOL/L (ref 95–110)
CO2 SERPL-SCNC: 25 MMOL/L (ref 23–29)
CREAT SERPL-MCNC: 0.9 MG/DL (ref 0.5–1.4)
DIFFERENTIAL METHOD BLD: ABNORMAL
EOSINOPHIL # BLD AUTO: 0 K/UL (ref 0–0.5)
EOSINOPHIL NFR BLD: 0 % (ref 0–8)
ERYTHROCYTE [DISTWIDTH] IN BLOOD BY AUTOMATED COUNT: 14.7 % (ref 11.5–14.5)
EST. GFR  (NO RACE VARIABLE): >60 ML/MIN/1.73 M^2
GLUCOSE SERPL-MCNC: 103 MG/DL (ref 70–110)
GLUCOSE SERPL-MCNC: 111 MG/DL (ref 70–110)
GLUCOSE SERPL-MCNC: 115 MG/DL (ref 70–110)
GLUCOSE SERPL-MCNC: 141 MG/DL (ref 70–110)
GLUCOSE SERPL-MCNC: 164 MG/DL (ref 70–110)
HCT VFR BLD AUTO: 35.2 % (ref 40–54)
HGB BLD-MCNC: 11.2 G/DL (ref 14–18)
IMM GRANULOCYTES # BLD AUTO: 0.33 K/UL (ref 0–0.04)
IMM GRANULOCYTES NFR BLD AUTO: 2.4 % (ref 0–0.5)
LYMPHOCYTES # BLD AUTO: 1 K/UL (ref 1–4.8)
LYMPHOCYTES NFR BLD: 7.3 % (ref 18–48)
MAGNESIUM SERPL-MCNC: 1.5 MG/DL (ref 1.6–2.6)
MCH RBC QN AUTO: 30.2 PG (ref 27–31)
MCHC RBC AUTO-ENTMCNC: 31.8 G/DL (ref 32–36)
MCV RBC AUTO: 95 FL (ref 82–98)
MONOCYTES # BLD AUTO: 0.5 K/UL (ref 0.3–1)
MONOCYTES NFR BLD: 3.6 % (ref 4–15)
NEUTROPHILS # BLD AUTO: 12.1 K/UL (ref 1.8–7.7)
NEUTROPHILS NFR BLD: 86.4 % (ref 38–73)
NRBC BLD-RTO: 0 /100 WBC
PLATELET # BLD AUTO: 252 K/UL (ref 150–450)
PMV BLD AUTO: 10.1 FL (ref 9.2–12.9)
POTASSIUM SERPL-SCNC: 4.3 MMOL/L (ref 3.5–5.1)
PROT SERPL-MCNC: 6 G/DL (ref 6–8.4)
RBC # BLD AUTO: 3.71 M/UL (ref 4.6–6.2)
SODIUM SERPL-SCNC: 138 MMOL/L (ref 136–145)
WBC # BLD AUTO: 13.95 K/UL (ref 3.9–12.7)

## 2024-08-13 PROCEDURE — 63600175 PHARM REV CODE 636 W HCPCS: Performed by: FAMILY MEDICINE

## 2024-08-13 PROCEDURE — 37000009 HC ANESTHESIA EA ADD 15 MINS: Performed by: INTERNAL MEDICINE

## 2024-08-13 PROCEDURE — 36415 COLL VENOUS BLD VENIPUNCTURE: CPT | Performed by: NURSE PRACTITIONER

## 2024-08-13 PROCEDURE — 63600175 PHARM REV CODE 636 W HCPCS: Performed by: NURSE PRACTITIONER

## 2024-08-13 PROCEDURE — 85025 COMPLETE CBC W/AUTO DIFF WBC: CPT | Performed by: NURSE PRACTITIONER

## 2024-08-13 PROCEDURE — A4216 STERILE WATER/SALINE, 10 ML: HCPCS | Performed by: NURSE PRACTITIONER

## 2024-08-13 PROCEDURE — 25000003 PHARM REV CODE 250: Performed by: STUDENT IN AN ORGANIZED HEALTH CARE EDUCATION/TRAINING PROGRAM

## 2024-08-13 PROCEDURE — 83735 ASSAY OF MAGNESIUM: CPT | Performed by: NURSE PRACTITIONER

## 2024-08-13 PROCEDURE — 25000003 PHARM REV CODE 250: Performed by: NURSE PRACTITIONER

## 2024-08-13 PROCEDURE — 63600175 PHARM REV CODE 636 W HCPCS: Performed by: STUDENT IN AN ORGANIZED HEALTH CARE EDUCATION/TRAINING PROGRAM

## 2024-08-13 PROCEDURE — 37000008 HC ANESTHESIA 1ST 15 MINUTES: Performed by: INTERNAL MEDICINE

## 2024-08-13 PROCEDURE — 80053 COMPREHEN METABOLIC PANEL: CPT | Performed by: NURSE PRACTITIONER

## 2024-08-13 PROCEDURE — 25000003 PHARM REV CODE 250: Performed by: NURSE ANESTHETIST, CERTIFIED REGISTERED

## 2024-08-13 PROCEDURE — 12000002 HC ACUTE/MED SURGE SEMI-PRIVATE ROOM

## 2024-08-13 PROCEDURE — 0DBP8ZX EXCISION OF RECTUM, VIA NATURAL OR ARTIFICIAL OPENING ENDOSCOPIC, DIAGNOSTIC: ICD-10-PCS | Performed by: INTERNAL MEDICINE

## 2024-08-13 PROCEDURE — 63600175 PHARM REV CODE 636 W HCPCS: Performed by: INTERNAL MEDICINE

## 2024-08-13 PROCEDURE — 25000003 PHARM REV CODE 250: Performed by: FAMILY MEDICINE

## 2024-08-13 PROCEDURE — 27200043 HC FORCEPS, BIOPSY: Performed by: INTERNAL MEDICINE

## 2024-08-13 RX ORDER — BUDESONIDE 3 MG/1
9 CAPSULE, COATED PELLETS ORAL DAILY
Status: DISCONTINUED | OUTPATIENT
Start: 2024-08-13 | End: 2024-08-15 | Stop reason: HOSPADM

## 2024-08-13 RX ORDER — PREDNISONE 20 MG/1
20 TABLET ORAL DAILY
Status: DISCONTINUED | OUTPATIENT
Start: 2024-08-13 | End: 2024-08-15 | Stop reason: HOSPADM

## 2024-08-13 RX ORDER — PROPOFOL 10 MG/ML
VIAL (ML) INTRAVENOUS
Status: DISCONTINUED | OUTPATIENT
Start: 2024-08-13 | End: 2024-08-13

## 2024-08-13 RX ADMIN — Medication 50 MG: at 12:08

## 2024-08-13 RX ADMIN — MORPHINE SULFATE 2 MG: 2 INJECTION, SOLUTION INTRAMUSCULAR; INTRAVENOUS at 12:08

## 2024-08-13 RX ADMIN — Medication 20 MG: at 12:08

## 2024-08-13 RX ADMIN — BUSPIRONE HYDROCHLORIDE 10 MG: 5 TABLET ORAL at 09:08

## 2024-08-13 RX ADMIN — VANCOMYCIN HYDROCHLORIDE 125 MG: KIT at 09:08

## 2024-08-13 RX ADMIN — ONDANSETRON 4 MG: 2 INJECTION INTRAMUSCULAR; INTRAVENOUS at 05:08

## 2024-08-13 RX ADMIN — MORPHINE SULFATE 2 MG: 2 INJECTION, SOLUTION INTRAMUSCULAR; INTRAVENOUS at 09:08

## 2024-08-13 RX ADMIN — MORPHINE SULFATE 2 MG: 2 INJECTION, SOLUTION INTRAMUSCULAR; INTRAVENOUS at 10:08

## 2024-08-13 RX ADMIN — METHYLPREDNISOLONE SODIUM SUCCINATE 30 MG: 40 INJECTION, POWDER, FOR SOLUTION INTRAMUSCULAR; INTRAVENOUS at 12:08

## 2024-08-13 RX ADMIN — HYDROCODONE BITARTRATE AND ACETAMINOPHEN 1 TABLET: 10; 325 TABLET ORAL at 10:08

## 2024-08-13 RX ADMIN — MORPHINE SULFATE 2 MG: 2 INJECTION, SOLUTION INTRAMUSCULAR; INTRAVENOUS at 05:08

## 2024-08-13 RX ADMIN — PANCRELIPASE 3 CAPSULE: 60000; 12000; 38000 CAPSULE, DELAYED RELEASE PELLETS ORAL at 05:08

## 2024-08-13 RX ADMIN — SODIUM CHLORIDE, PRESERVATIVE FREE 10 ML: 5 INJECTION INTRAVENOUS at 05:08

## 2024-08-13 RX ADMIN — MORPHINE SULFATE 2 MG: 2 INJECTION, SOLUTION INTRAMUSCULAR; INTRAVENOUS at 02:08

## 2024-08-13 RX ADMIN — METRONIDAZOLE 500 MG: 5 INJECTION, SOLUTION INTRAVENOUS at 05:08

## 2024-08-13 RX ADMIN — BUDESONIDE 9 MG: 3 CAPSULE, COATED PELLETS ORAL at 02:08

## 2024-08-13 RX ADMIN — SODIUM CHLORIDE: 0.9 INJECTION, SOLUTION INTRAVENOUS at 12:08

## 2024-08-13 RX ADMIN — PREDNISONE 20 MG: 20 TABLET ORAL at 02:08

## 2024-08-13 RX ADMIN — VANCOMYCIN HYDROCHLORIDE 2000 MG: 500 INJECTION, POWDER, LYOPHILIZED, FOR SOLUTION INTRAVENOUS at 10:08

## 2024-08-13 RX ADMIN — HYDROCODONE BITARTRATE AND ACETAMINOPHEN 1 TABLET: 10; 325 TABLET ORAL at 06:08

## 2024-08-13 RX ADMIN — BUSPIRONE HYDROCHLORIDE 10 MG: 5 TABLET ORAL at 02:08

## 2024-08-13 RX ADMIN — ZOLPIDEM TARTRATE 10 MG: 5 TABLET, COATED ORAL at 09:08

## 2024-08-13 RX ADMIN — Medication 6 MG: at 12:08

## 2024-08-13 RX ADMIN — LORAZEPAM 0.5 MG: 0.5 TABLET ORAL at 09:08

## 2024-08-13 RX ADMIN — Medication 6 MG: at 09:08

## 2024-08-13 NOTE — SUBJECTIVE & OBJECTIVE
Interval History: Patient said he feels slightly better today and he also had bowl movement this morning    Review of Systems   All other systems reviewed and are negative.    Objective:     Vital Signs (Most Recent):  Temp: 96.8 °F (36 °C) (08/13/24 1313)  Pulse: 62 (08/13/24 1325)  Resp: 17 (08/13/24 1313)  BP: 112/63 (08/13/24 1313)  SpO2: 99 % (08/13/24 1325) Vital Signs (24h Range):  Temp:  [96.8 °F (36 °C)-98.4 °F (36.9 °C)] 96.8 °F (36 °C)  Pulse:  [58-75] 62  Resp:  [8-18] 17  SpO2:  [86 %-100 %] 99 %  BP: (100-154)/(63-93) 112/63     Weight: 121.2 kg (267 lb 3.2 oz)  Body mass index is 44.46 kg/m².    Intake/Output Summary (Last 24 hours) at 8/13/2024 1445  Last data filed at 8/13/2024 1315  Gross per 24 hour   Intake 640 ml   Output 8 ml   Net 632 ml         Physical Exam  Constitutional:       Appearance: He is obese.   Cardiovascular:      Rate and Rhythm: Normal rate.      Pulses: Normal pulses.   Abdominal:      Tenderness: There is no abdominal tenderness.   Skin:     Comments: Wound: Refer to imaging upper extremity left   Neurological:      Mental Status: He is alert and oriented to person, place, and time.             Significant Labs: All pertinent labs within the past 24 hours have been reviewed.    Significant Imaging: I have reviewed all pertinent imaging results/findings within the past 24 hours.

## 2024-08-13 NOTE — ASSESSMENT & PLAN NOTE
Follow up on wound culture  Wound and ID are following  Continue with antibiotics per ID recommendations

## 2024-08-13 NOTE — ANESTHESIA PREPROCEDURE EVALUATION
08/13/2024  Jacoby Jean-Baptiste is a 49 y.o., male.    Results for orders placed or performed during the hospital encounter of 08/09/24   EKG 12-lead    Collection Time: 08/09/24 12:52 PM   Result Value Ref Range    QRS Duration 78 ms    [facility] QTC Calculation 433 ms    Narrative    Test Reason : R11.10,    Vent. Rate : 078 BPM     Atrial Rate : 078 BPM     P-R Int : 128 ms          QRS Dur : 078 ms      QT Int : 380 ms       P-R-T Axes : 009 022 022 degrees     QTc Int : 433 ms    Normal sinus rhythm  Normal ECG  Confirmed by Josh Mathis MD ([id number]) on 8/9/2024 9:06:15 PM    Referred By: [email or web address]   SELF           Confirmed By:Josh Mathis MD        Imaging Results              CT Abdomen Pelvis With IV Contrast NO Oral Contrast (Final result)  Result time 08/09/24 19:21:18      Final result by Kirk Dooley MD (08/09/24 19:21:18)                   Impression:      Redemonstration of findings suggesting a nonspecific proctocolitis including infectious or inflammatory etiologies.  Overall extent appears mildly increased in length/extent compared to prior examination of 08/04/2024.  No evidence of abscess or free intraperitoneal air.    Stable appearing 0.4 cm calcification within the proximal right ureter without evidence of hydronephrosis.  Nonobstructing left-sided nephrolithiasis.    Additional stable findings as above.      Electronically signed by: Kirk Dooley MD  Date:    08/09/2024  Time:    19:21               Narrative:    EXAMINATION:  CT ABDOMEN PELVIS WITH IV CONTRAST    CLINICAL HISTORY:  LLQ abdominal pain;    TECHNIQUE:  Low dose axial images, sagittal and coronal reformations were obtained from the lung bases to the pubic symphysis following the IV administration of 100 mL of Omnipaque 350 .  Oral contrast was not given.    COMPARISON:  08/04/2024    FINDINGS:  The lung bases  are unremarkable.  There is no pleural fluid present.  The visualized portions of the heart appear normal.    There is decreased attenuation of the hepatic parenchyma suggesting hepatic steatosis.  The portal vein is patent.  The gallbladder is surgically absent.  There is no intra-or extrahepatic biliary ductal dilatation.    The stomach, spleen, pancreas, and adrenal glands are unremarkable.    Kidneys are normal in size and location enhance symmetrically.  There is a stable appearing 0.4 cm calcification within the proximal right ureter without significant hydronephrosis.  There is nonobstructing left-sided nephrolithiasis.  Urinary bladder appears within normal limits.  Prostate is unremarkable.  No significant free fluid in the pelvis.    The abdominal aorta is normal in course and caliber with mild atherosclerotic calcification along its course.  No retroperitoneal hematoma.    No evidence of small-bowel obstruction.  The terminal ileum is unremarkable.  Redemonstration of abnormal long segment proctocolitis extending from the rectum to the hepatic flexure colon.  This appears mildly increased in overall length/extent compared to prior examination.  No evidence of abscess or free intraperitoneal air.  Appendix is not definitively visualized.  There are scattered shotty small mesenteric and retroperitoneal lymph nodes.    Osseous structures demonstrate mild degenerative changes.  The extraperitoneal soft tissues are unremarkable.                                       X-Ray Chest AP Portable (Final result)  Result time 08/09/24 14:12:15      Final result by Kelton Wells IV, MD (08/09/24 14:12:15)                   Impression:      No acute cardiopulmonary disease.      Electronically signed by: Kelton Wells  Date:    08/09/2024  Time:    14:12               Narrative:    EXAMINATION:  XR CHEST AP PORTABLE    CLINICAL HISTORY:  Sepsis;    COMPARISON:  07/28/2024    FINDINGS:  The patient is rotated.    Cardiac  size is within normal limits.  Central pulmonary vasculature is not acutely engorged.    No confluent infiltrate or significant volume loss is observed.  No effusion is demonstrated.  There is mild apical pleural thickening bilaterally.                                       Lab Results   Component Value Date    WBC 13.95 (H) 08/13/2024    HGB 11.2 (L) 08/13/2024    HCT 35.2 (L) 08/13/2024    MCV 95 08/13/2024     08/13/2024     BMP  Lab Results   Component Value Date     08/13/2024    K 4.3 08/13/2024     08/13/2024    CO2 25 08/13/2024    BUN 11 08/13/2024    CREATININE 0.9 08/13/2024    CALCIUM 8.3 (L) 08/13/2024    ANIONGAP 8 08/13/2024     (H) 08/13/2024     (H) 08/12/2024     (H) 08/11/2024       Results for orders placed during the hospital encounter of 11/17/23    Echo    Interpretation Summary    Left Ventricle: The left ventricle is normal in size. Normal wall thickness. There is concentric remodeling. Normal wall motion. There is normal systolic function with a visually estimated ejection fraction of 55 - 60%. There is normal diastolic function.    Right Ventricle: Normal right ventricular cavity size. Wall thickness is normal. Right ventricle wall motion  is normal. Systolic function is normal.    IVC/SVC: Normal venous pressure at 3 mmHg.          Pre-op Assessment    I have reviewed the Patient Summary Reports.     I have reviewed the Nursing Notes. I have reviewed the NPO Status.   I have reviewed the Medications.     Review of Systems  Anesthesia Hx:  No problems with previous Anesthesia             Denies Family Hx of Anesthesia complications.    Denies Personal Hx of Anesthesia complications.                    Social:  Former Smoker, Alcohol Use       Hematology/Oncology:    Oncology Normal    -- Anemia:                                  EENT/Dental:   Very poor dentition.  Most teeth broken or missing.          Cardiovascular:     Hypertension, poorly  controlled    Dysrhythmias       hyperlipidemia   ECG has been reviewed. History of supraventricular tachycardia  S/P radiofrequency ablation operation for arrhythmia                         Renal/:  Chronic Renal Disease renal calculi       Kidney Function/Disease             Hepatic/GI:   PUD,   Liver Disease,  History of C. difficile colitis  Recent nausea and vomiting.  Last emesis a few days ago.  Last food and drink yesterday.     Bowel Conditions:  Inflammatory Bowel Disease, Ulcerative Colitis     Liver Disease        Musculoskeletal:  Musculoskeletal Normal                Neurological:    Neuromuscular Disease,       Left-sided weakness                            Endocrine:  Diabetes, well controlled, type 2         Morbid Obesity / BMI > 40  Psych:  Psychiatric History anxiety depression Sleep Disorder and Insomnia.       Sleep Disorder and Insomnia.        Physical Exam  General: Well nourished, Cooperative, Alert and Oriented    Airway:  Mallampati: III   Mouth Opening: Normal  TM Distance: > 6 cm  Tongue: Normal  Neck ROM: Normal ROM    Dental:  Periodontal disease  Multiple chipped and broken   Patient denies loose teeth  Chest/Lungs:  Clear to auscultation, Normal Respiratory Rate    Heart:  Rate: Normal  Rhythm: Regular Rhythm  Sounds: Normal        Anesthesia Plan  Type of Anesthesia, risks & benefits discussed:    Anesthesia Type: Gen Natural Airway  Intra-op Monitoring Plan: Standard ASA Monitors  Post Op Pain Control Plan:   (medical reason for not using multimodal pain management)  Induction:  IV  Informed Consent: Informed consent signed with the Patient and all parties understand the risks and agree with anesthesia plan.  All questions answered.   ASA Score: 3  Anesthesia Plan Notes:       GNA  POM  Propofol     Ready For Surgery From Anesthesia Perspective.     .

## 2024-08-13 NOTE — ASSESSMENT & PLAN NOTE
Improving  Sepsis likely multifactorial with cellulitis and UC flare  Continue with antibiotics per ID recommendation  ID and GI are following

## 2024-08-13 NOTE — ASSESSMENT & PLAN NOTE
Flare suspected  Continue with steroids and pain control  GI plan for endoscopy and sigmoidoscopy on 8/13

## 2024-08-13 NOTE — PROGRESS NOTES
[facility]  Progress Note    Patient Name: Jacoby Jean-Baptiste  MRN: [id number]  Patient Class: IP- Inpatient   Admission Date: 8/9/2024  Length of Stay: 3 days  Attending Physician: Matteo Melendez DO  Primary Care Provider: Hunter Aguilar III, MD        Subjective:     Principal Problem:Sepsis        HPI:  50 y/o male with pMHx of 2 DM, C difficile colitis, ulcerative colitis, cavitary pneumonia, HLD, and hypertension who presented to the ED with complaints of vomiting and diarrhea x1 week.  Patient was seen in the ED on 08/05/2024 with similar symptoms and went home to take his Levaquin and Flagyl that was previously prescribed and did not fill the prescriptions.  Today he made decision to return to the ED with worsening symptoms.  Patient is also with left upper arm site of previous I and D of abscess on oral Bactrim.  We will stop Bactrim and consult wound care for topical treatment as patient is on systemic antibiotics with no change.  On arrival patient with a WBCs 17.2, sodium 134, CO2 20, creatinine 2.0, lactic 1.69.  CTA abdomen and pelvis with IV contrast redemonstration of findings suggesting a nonspecific proctocolitis.  Patient was given 3 L of fluid in the ED he was tested for C difficile and found to be negative for antigens and toxins.  Patient will be admitted for IV antibiotic treatment.    Overview/Hospital Course:  Jacoby Jean-Baptiste was admitted to the service of hospital medicine for treatment of cellulitis/abscess to E and suspected UC flare.  He was given IVFH and started on IV abx.  GI and ID were consulted.  Abx managed by ID.  Pain was controlled with IV and oral narcotics.  He required IV antiemetics.  Nausea improved to his baseline.  Diet was advanced as tolerated.    Interval History: Patient said he feels slightly better today and he also had bowl movement this morning    Review of Systems   All other systems reviewed and are negative.    Objective:      Vital Signs (Most Recent):  Temp: 96.8 °F (36 °C) (08/13/24 1313)  Pulse: 62 (08/13/24 1325)  Resp: 18 (08/13/24 1455)  BP: 112/63 (08/13/24 1313)  SpO2: 99 % (08/13/24 1325) Vital Signs (24h Range):  Temp:  [96.8 °F (36 °C)-98.4 °F (36.9 °C)] 96.8 °F (36 °C)  Pulse:  [58-75] 62  Resp:  [8-18] 18  SpO2:  [86 %-100 %] 99 %  BP: (100-154)/(63-93) 112/63     Weight: 121.2 kg (267 lb 3.2 oz)  Body mass index is 44.46 kg/m².    Intake/Output Summary (Last 24 hours) at 8/13/2024 1502  Last data filed at 8/13/2024 1315  Gross per 24 hour   Intake 640 ml   Output 8 ml   Net 632 ml         Physical Exam  Constitutional:       Appearance: He is obese.   Cardiovascular:      Rate and Rhythm: Normal rate.      Pulses: Normal pulses.   Abdominal:      Tenderness: There is no abdominal tenderness.   Skin:     Comments: Wound: Refer to imaging upper extremity left   Neurological:      Mental Status: He is alert and oriented to person, place, and time.             Significant Labs: All pertinent labs within the past 24 hours have been reviewed.    Significant Imaging: I have reviewed all pertinent imaging results/findings within the past 24 hours.      Assessment/Plan:      * Sepsis  Improving  Sepsis likely multifactorial with cellulitis and UC flare  Continue with antibiotics per ID recommendation  ID and GI are following      Ulcerative colitis  Flare suspected  Continue with steroids and pain control  Started on budesonide 9 mg daily and 2 weeks of steroid 20 mg daily on 8/13 per GI recommendations  GI plan for endoscopy and sigmoidoscopy on 8/13        Skin abscess  Follow up on wound culture  Wound and ID are following  Continue with antibiotics per ID recommendations    Acute renal failure  NAZIA is likely due to pre-renal azotemia due to dehydration. Baseline creatinine is  1.2 . Most recent creatinine and eGFR are listed below.  Recent Labs     08/09/24  1151 08/10/24  0936 08/11/24  0315   CREATININE 2.0* 1.4 1.0  "  EGFRNORACEVR 39.9* >60.0 >60.0        Plan  - NAZIA has resolved. Continue to monitor renal function.  Labs not collected this morning for some reason.  Re-ordered and awaiting results.    Proctocolitis  See under UC          Type 2 diabetes mellitus without complication, without long-term current use of insulin  Patient's FSGs are controlled on current medication regimen.  Last A1c reviewed-   Lab Results   Component Value Date    HGBA1C 5.2 06/06/2024     Most recent fingerstick glucose reviewed- No results for input(s): "POCTGLUCOSE" in the last 24 hours.  Current correctional scale  Low  Maintain anti-hyperglycemic dose as follows-   Antihyperglycemics (From admission, onward)      Start     Stop Route Frequency Ordered    08/09/24 2137  insulin aspart U-100 pen 0-5 Units         -- SubQ Before meals & nightly PRN 08/09/24 2038          Hold Oral hypoglycemics while patient is in the hospital.  Hold metformin, Ozempic-- start low dose SSI    Morbid obesity  Body mass index is 43.93 kg/m². Morbid obesity complicates all aspects of disease management from diagnostic modalities to treatment. Weight loss encouraged and health benefits explained to patient.           VTE Risk Mitigation (From admission, onward)           Ordered     Reason for No Pharmacological VTE Prophylaxis  Once        Question:  Reasons:  Answer:  Risk of Bleeding  Comment:  ulcerative colitis patient    08/09/24 2038     IP VTE HIGH RISK PATIENT  Once         08/09/24 2038     Place sequential compression device  Until discontinued         08/09/24 2038                    Discharge Planning   GERARDO: 8/15/2024     Code Status: Full Code   Is the patient medically ready for discharge?:     Reason for patient still in hospital (select all that apply): Patient trending condition  Discharge Plan A: Home with family   Discharge Delays: (!) Procedure Scheduling (IR, OR, Labs, Echo, Cath, Echo, EEG)              Matteo Melendez DO  Department of Hospital " Medicine   [facility]

## 2024-08-13 NOTE — ASSESSMENT & PLAN NOTE
Flare suspected  Continue with steroids and pain control  Started on budesonide 9 mg daily and 2 weeks of steroid 20 mg daily on 8/13 per GI recommendations  S/p endoscopy and sigmoidoscopy on 8/13

## 2024-08-13 NOTE — SUBJECTIVE & OBJECTIVE
Interval History: Patient said he feels slightly better today and he also had bowl movement this morning    Review of Systems   All other systems reviewed and are negative.    Objective:     Vital Signs (Most Recent):  Temp: 97.2 °F (36.2 °C) (08/14/24 1656)  Pulse: 62 (08/14/24 1656)  Resp: 19 (08/14/24 1656)  BP: 138/68 (08/14/24 1656)  SpO2: 99 % (08/14/24 1656) Vital Signs (24h Range):  Temp:  [97.2 °F (36.2 °C)-97.9 °F (36.6 °C)] 97.2 °F (36.2 °C)  Pulse:  [62-76] 62  Resp:  [16-19] 19  SpO2:  [97 %-100 %] 99 %  BP: (131-145)/(68-85) 138/68     Weight: 121.2 kg (267 lb 3.2 oz)  Body mass index is 44.46 kg/m².    Intake/Output Summary (Last 24 hours) at 8/14/2024 1825  Last data filed at 8/14/2024 1657  Gross per 24 hour   Intake 480 ml   Output 1 ml   Net 479 ml         Physical Exam  Constitutional:       Appearance: He is obese.   Cardiovascular:      Rate and Rhythm: Normal rate.      Pulses: Normal pulses.   Abdominal:      Tenderness: There is no abdominal tenderness.   Skin:     Comments: Wound: Refer to imaging upper extremity left   Neurological:      Mental Status: He is alert and oriented to person, place, and time.             Significant Labs: All pertinent labs within the past 24 hours have been reviewed.    Significant Imaging: I have reviewed all pertinent imaging results/findings within the past 24 hours.

## 2024-08-13 NOTE — [employer] PATIENT INSTRUCTIONS
Discharge Summary/Instructions after an Endoscopic Procedure  Patient Name: Jacoby Jean-Baptiste  Patient MRN: [id number]  Patient YOB: 1975 Tuesday, August 13, 2024  Shan Jean III, MD  RESTRICTIONS:  During your procedure today, you received medications for sedation.  These   medications may affect your judgment, balance and coordination.  Therefore,   for 24 hours, you have the following restrictions:   - DO NOT drive a car, operate machinery, make legal/financial decisions,   sign important papers or drink alcohol.    ACTIVITY:  Today: no heavy lifting, straining or running due to procedural   sedation/anesthesia.  The following day: return to full activity including work.  DIET:  Eat and drink normally unless instructed otherwise.     TREATMENT FOR COMMON SIDE EFFECTS:  - Mild abdominal pain, nausea, belching, bloating or excessive gas:  rest,   eat lightly and use a heating pad.  - Sore Throat: treat with throat lozenges and/or gargle with warm salt   water.  - Because air was used during the procedure, expelling large amounts of air   from your rectum or belching is normal.  - If a bowel prep was taken, you may not have a bowel movement for 1-3 days.    This is normal.  SYMPTOMS TO WATCH FOR AND REPORT TO YOUR PHYSICIAN:  1. Abdominal pain or bloating, other than gas cramps.  2. Chest pain.  3. Back pain.  4. Signs of infection such as: chills or fever occurring within 24 hours   after the procedure.  5. Rectal bleeding, which would show as bright red, maroon, or black stools.   (A tablespoon of blood from the rectum is not serious, especially if   hemorrhoids are present.)  6. Vomiting.  7. Weakness or dizziness.  GO DIRECTLY TO THE NEAREST EMERGENCY ROOM IF YOU HAVE ANY OF THE FOLLOWING:      Difficulty breathing              Chills and/or fever over 101 F   Persistent vomiting and/or vomiting blood   Severe abdominal pain   Severe chest pain   Black, tarry stools   Bleeding- more than one  tablespoon   Any other symptom or condition that you feel may need urgent attention  Your doctor recommends these additional instructions:  If any biopsies were taken, your doctors clinic will contact you in 1 to 2   weeks with any results.  - Await pathology & CMV results.   - Return patient to hospital [facility] for ongoing care.   - Continue budesonide 9mg daily and VSL#3 indefiintely for now. Continue   prednisone 20 mg daily x 2 weeks.  For questions, problems or results please call your physician - Shan Jean III, MD at Work:  [phone].  [facility], EMERGENCY ROOM PHONE NUMBER: [phone]  IF A COMPLICATION OR EMERGENCY SITUATION ARISES AND YOU ARE UNABLE TO REACH   YOUR PHYSICIAN - GO DIRECTLY TO THE EMERGENCY ROOM.  Shan Jean III, MD  8/13/2024 1:07:32 PM  This report has been verified and signed electronically.  Dear patient,  As a result of recent federal legislation (The Federal Cures Act), you may   receive lab or pathology results from your procedure in your [facility]   account before your physician is able to contact you. Your physician or   their representative will relay the results to you with their   recommendations at their soonest availability.  Thank you,  [employer]

## 2024-08-13 NOTE — PROGRESS NOTES
[facility]   Department of Infectious Disease  Progress Note        PATIENT NAME: Jacoby Jean-Baptiste  MRN: [id number]  TODAY'S DATE: 08/13/2024  ADMIT DATE: 8/9/2024  LOS: 3 days    CHIEF COMPLAINT: Vomiting and Diarrhea (X 1 week)    PRINCIPLE PROBLEM: Sepsis    REASON FOR CONSULT: Colitis and cellulitis, plz advise on abx     INTERVAL HISTORY     8/11:  Patient seen and examined, he is lying in bed, awake, alert, looks a little more comfortable today now that he is tolerating diet.   Unfortunately, continues to have multiple episodes of loose bowel movements, 10 in the last 24 hours, high suspicion for colitis flare.  Discussed with GI, no contraindication to start steroids to see if we can help his UC flare.  Plan for colonoscopy earlier next week.  Patient mentions his left arm is better, less tender, erythema is improving.  Noted to have an open wound with adipose tissue seen, no evidence of purulent drainage noted, will ask the nurse to pack it for now.  Ultrasound left arm no solid or cystic mass.  Superficial hypoechoic focus measuring up to 6 mm, possibly reflecting edema or scaring.  Hemodynamically stable, afebrile.  Adequate urinary output.  Micro reviewed, stool for C diff negative, GI PCR nothing detected, stool culture nothing significant to date.  Blood cultures x2 sets no growth to date, pending final.  Labs reviewed, leukocytosis improved 9.8, left shift 91%, H&H 12.2/37.3, platelet count 250.  Hyponatremia 133, potassium 4.7, NAZIA resolved creatinine 1, AST 8/ALT 5.  Procalcitonin is negative 0.1, CRP 4.2, slightly high likely from underlying UC flare.    8/12/24@1226 (Denette):  He is sitting up in bed awake and alert.  He continues with mild diffuse abdominal pain that he says he was all the time.   Left upper arm is tender to palpation. He had 7 diarrhea stools in the last 24 hours.  He has not noticed any blood in the stool.  He was eating and drinking well with no vomiting since  "Friday.  We examined wound in his left arm and erythema is much better there is some induration around incision sites and packing was removed.  Nurses going to replace packing and re-dressed the arm.   He denies fevers or chills, dysuria, headaches or dizziness. A culture was obtained from the wound.  T-max 98° in the last 24 hours.  WBC 14.11 increased, left shift 87%, no bands, creatinine 1.0, creatinine clearance 106, MRSA screen negative, stool culture negative, blood cultures no growth to date, stool for C diff negative.      8/13/24@1213: Patient was endoscopy and was not seen today.   Reviewed cultures no growth to date, blood cultures no growth to date.  Afebrile temperature 98.4°.  WBC 13.95 trending down.  Left shift 86% trending down, no bands, platelets 252, creatinine 0.9.  Creatinine clearance 118.   We will see patient tomorrow.    Antibiotics (From admission, onward)      Start     Stop Route Frequency Ordered    08/13/24 0500  vancomycin (VANCOCIN) 2,000 mg in D5W 500 mL IVPB         -- IV Every 12 hours (non-standard times) 08/12/24 1616    08/12/24 2100  vancomycin oral suspension 125 mg         -- Oral 2 times daily 08/12/24 1513    08/12/24 1558  vancomycin - pharmacy to dose  (vancomycin IVPB (PEDS and ADULTS))        Placed in "And" Linked Group    -- IV pharmacy to manage frequency 08/12/24 1513    08/11/24 1300  metronidazole IVPB 500 mg         -- IV Every 8 hours (non-standard times) 08/11/24 1228    08/10/24 1600  cefTRIAXone (ROCEPHIN) 2 g in dextrose 5 % 100 mL IVPB (ready to mix)         -- IV Every 24 hours (non-standard times) 08/10/24 1453           Antifungals (From admission, onward)      None           Antivirals (From admission, onward)      None               ASSESSMENT and PLAN     Left arm SSTi, rule out abscess    Blood cultures x2 sets no growth to date, pending final  Soft tissue US left arm: Superficial hypoechoic focus measuring up to 6 mm is present near this region, " possibly reflecting edema, infected fluid, or scarring   CRP 4.2, slightly high   MRSA nasal swab negative    Diarrhea, history of ulcerative colitis on Omvoh (Mirikizumab) last dose - rule out UC flare  Calprotectin: >8000-->2480-->1100  Recent colonoscopy 7/16: CHRONIC COLITIS WITH MILD SUPERFICIAL ACUTE INFLAMMATORY CHANGES.   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    HPI      Jacoby Jean-Baptiste is a 49 y.o. male morbidly obese, BMI 44.4 kg/m2, known to our service from prior admission in December with cavitary/atypical pneumonia with positive Aspergillus antigen status post voriconazole.  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  He mentions since July 20th he started noticing redness on his left upper arm.  He was seen by surgery on 08/01 status post I&D, unfortunately no cultures were sent.  Upon chart reviewed, he had a recent prescription for levofloxacin and Flagyl which he  did not  at the pharmacy.    Patient is complaining of headache, generalized weakness, fatigue, multiple, nonbloody, frequent loose bowel movements, denies any urinary symptoms.  Labs yesterday with leukocytosis 17.2, left shift 78%, H&H 14.3/43.2, platelet count 296   Hyponatremia 134, potassium 4   NAZIA, creatinine 2, down to 1.4 today, LFTs normal   UA 1+ leukocyte esterase, hyaline casts, negative for UTI   Stool for C diff negative     Review of patient's allergies indicates:  No Known Allergies     Antibiotic history:    Vancomycin 8/9   Levofloxacin 8/9   Piperacillin tazobactam 7/9   Cefepime 8/9-10  Metronidazole 8/9  Rocephin 8/10    Microbiology:    Stool for C diff 8/9 negative   Stool culture 8/10 negative  Blood cultures 8/9 x2 sets no growth to date, pending final    SUBJECTIVE     Review of systems  Review of systems obtained and negative except as stated above in Interval History     OBJECTIVE   Temp:  [97.8 °F (36.6 °C)-98.4 °F (36.9 °C)] 98.2 °F (36.8 °C)  Pulse:  [64-75] 64  Resp:  [10-18] 18  SpO2:  [94 %-99 %] 94 %  BP: (110-138)/(57-93) 114/68  Temp:  [97.8 °F (36.6 °C)-98.4 °F (36.9 °C)]   Temp: 98.2 °F (36.8 °C) (08/13/24 0815)  Pulse: 64 (08/13/24 0815)  Resp: 18 (08/13/24 1024)  BP: 114/68 (08/13/24 0815)  SpO2: (!) 94 % (08/13/24 0815)    Intake/Output Summary (Last 24 hours) at 8/13/2024 1048  Last data filed at 8/13/2024 0828  Gross per 24 hour   Intake 250 ml   Output 2 ml   Net 248 ml       Physical Exam  General:   Sitting up in bed awake and alert, no distress noted.  Eyes: Eyes with no icterus or injection. Vision grossly normal  Ears: Hearing grossly normal.  Nose: Nares patent  Mouth: Moist mucous membranes, dentition poor. No ulcerations, erythema or exudates.  Cardiovascular: Regular rate and rhythm, no murmurs, no edema.    Respiratory:  Clear to auscultation bilaterally, no tachypnea or increased work of breathing.  Gastrointestinal:  Soft and obese with active bowel  sounds, + tenderness to palpation, no distention.  Musculoskeletal:  Moves all extremities with good strength.    Skin:  Warm and dry, Left arm with resolving erythema, 2 small wounds noted, no evidence of pus, adipose tissue seen consistent with resolving cellulitis, nontender to palpation -, needs to be packed  8/10: left arm with mild cellulitis, indurated area appreciated approaching the axilla, tender to palpation, no evidence of drainage noted consistent with cellulitis  8/12   No erythema, 2 small wounds, no drainage but packing removed and it does appear purulent, cultures obtained.  Neuro:   Oriented, conversant, follows commands.  Psych: Good mood, normal affect.   VAD:  Midline placed 08/11/2024 to right basilic  ISOLATION: None    Wounds:   8/12/24        8/11/24      8/10:      7/20:      Significant Labs: All pertinent labs within the past 24 hours have been reviewed.    CBC LAST 7  Recent Labs   Lab 08/09/24  1151 08/10/24  1156 08/11/24  0315 08/12/24  0833 08/13/24  0410   WBC 17.28* 13.75* 9.86 14.11* 13.95*   RBC 4.61 4.20* 3.99* 4.05* 3.71*   HGB 14.3 12.8* 12.2* 12.2* 11.2*   HCT 43.2 40.8 37.3* 38.3* 35.2*   MCV 94 97 94 95 95   MCH 31.0 30.5 30.6 30.1 30.2   MCHC 33.1 31.4* 32.7 31.9* 31.8*   RDW 14.6* 14.8* 14.4 14.6* 14.7*    263 250 262 252   MPV 10.1 9.7 10.1 9.8 10.1   GRAN 78.0*  13.5* 79.1*  10.9* 91.0*  9.0* 87.0*  12.3* 86.4*  12.1*   LYMPH 11.2*  1.9 10.3*  1.4 6.3*  0.6* 7.4*  1.0 7.3*  1.0   MONO 7.6  1.3* 7.1  1.0 1.6*  0.2* 3.9*  0.6 3.6*  0.5   BASO 0.10 0.10 0.02 0.06 0.04   NRBC 0 0 0 0 0       CHEMISTRY LAST 7  Recent Labs   Lab 08/09/24  1151 08/10/24  0936 08/11/24  0315 08/12/24  0833 08/13/24  0410   * 133* 133* 135* 138   K 4.0 4.2 4.7 4.4 4.3    106 105 105 105   CO2 20* 17* 18* 22* 25   ANIONGAP 11 10 10 8 8   BUN 10 7 5* 7 11   CREATININE 2.0* 1.4 1.0 1.0 0.9   GLU 82 54* 122* 129* 141*   CALCIUM 8.8 7.9* 7.9* 8.3* 8.3*   MG  --  1.0*  "1.4* 1.5* 1.5*   ALBUMIN 3.6 3.2* 3.0* 3.3* 3.1*   PROT 7.3 6.3 6.2 6.4 6.0   ALKPHOS 77 65 59 54* 48*   ALT 6* 5* 5* 7* 10   AST 11 11 8* 12 15   BILITOT 0.3 0.3 0.2 0.2 0.2       Estimated Creatinine Clearance: 118.6 mL/min (based on SCr of 0.9 mg/dL).    INFLAMMATORY/PROCAL  LAST 7  Recent Labs   Lab 08/11/24  0315   PROCAL 0.166   CRP 4.20*     No results found for: "ESR"  CRP   Date Value Ref Range Status   08/11/2024 4.20 (H) <1.00 mg/dL Final     Comment:     CRP-Normal Application expected values:   <1.0        mg/dL   Normal Range  1.0 - 5.0  mg/dL   Indicates mild inflammation  5.0 - 10.0 mg/dL   Indicates severe inflammation  >10.0        mg/dL   Represents serious processes and   frequently         indicates the presence of a bacterial   infection.      07/15/2024 1.80 (H) <1.00 mg/dL Final     Comment:     CRP-Normal Application expected values:   <1.0        mg/dL   Normal Range  1.0 - 5.0  mg/dL   Indicates mild inflammation  5.0 - 10.0 mg/dL   Indicates severe inflammation  >10.0        mg/dL   Represents serious processes and   frequently         indicates the presence of a bacterial   infection.      07/12/2024 1.30 (H) <1.00 mg/dL Final     Comment:     CRP-Normal Application expected values:   <1.0        mg/dL   Normal Range  1.0 - 5.0  mg/dL   Indicates mild inflammation  5.0 - 10.0 mg/dL   Indicates severe inflammation  >10.0        mg/dL   Represents serious processes and   frequently         indicates the presence of a bacterial   infection.      06/03/2024 6.90 (H) <1.00 mg/dL Final     Comment:     CRP-Normal Application expected values:   <1.0        mg/dL   Normal Range  1.0 - 5.0  mg/dL   Indicates mild inflammation  5.0 - 10.0 mg/dL   Indicates severe inflammation  >10.0        mg/dL   Represents serious processes and   frequently         indicates the presence of a bacterial   infection.      11/30/2023 61.6 (H) 0.0 - 8.2 mg/L Final   11/22/2023 187.6 (H) 0.0 - 8.2 mg/L Final "   11/20/2023 265.5 (H) 0.0 - 8.2 mg/L Final       PRIOR  MICROBIOLOGY:    Susceptibility data from last 90 days.  Collected Specimen Info Organism   07/28/24 Blood from Peripheral, Antecubital, Right No growth after 5 days.   07/28/24 Blood from Peripheral, Antecubital, Left No growth after 5 days.   07/15/24 Blood from Peripheral, Antecubital, Left No growth after 5 days.   07/15/24 Blood from Peripheral, Antecubital, Right No growth after 5 days.         LAST 7 DAYS MICROBIOLOGY   Microbiology Results (last 7 days)       Procedure Component Value Units Date/Time    Gram stain [[id number]] Collected: 08/12/24 1237    Order Status: Completed Specimen: Wound from Arm Updated: 08/13/24 0845     Gram Stain Result Few WBC's      No organisms seen    Aerobic culture [[id number]] Collected: 08/12/24 1237    Order Status: Completed Specimen: Abscess from Arm, Left Updated: 08/13/24 0803     Aerobic Bacterial Culture No growth    Gram stain [[id number]]     Order Status: Completed Specimen: Wound from Arm, Left     Blood culture x two cultures. Draw prior to antibiotics. [[id number]] Collected: 08/09/24 1409    Order Status: Completed Specimen: Blood Updated: 08/12/24 1632     Blood Culture, Routine No Growth to date      No Growth to date      No Growth to date      No Growth to date    Narrative:      Aerobic and anaerobic    Blood culture x two cultures. Draw prior to antibiotics. [[id number]] Collected: 08/09/24 1544    Order Status: Completed Specimen: Blood Updated: 08/12/24 1632     Blood Culture, Routine No Growth to date      No Growth to date      No Growth to date      No Growth to date    Narrative:      Aerobic and anaerobic  Collection has been rescheduled by CZB at 08/09/2024 15:00 Reason:   Unable to collect  Collection has been rescheduled by CZB at 08/09/2024 15:00 Reason:   Unable to collect    Culture, Anaerobe [[id number]] Collected: 08/12/24 1237    Order Status: Sent Specimen: Abscess from Arm, Left  Updated: 08/12/24 1240    Stool culture [[id number]] Collected: 08/10/24 1210    Order Status: Completed Specimen: Stool Updated: 08/12/24 0849     Stool Culture No Salmonella,Shigella,Vibrio,Campylobacter.      No E coli 0157:H7 isolated.    MRSA Screen by PCR [[id number]] Collected: 08/10/24 1650    Order Status: Completed Specimen: Nasal Swab Updated: 08/10/24 1829     MRSA SCREEN BY PCR Negative    Clostridium difficile EIA [[id number]] Collected: 08/09/24 1439    Order Status: Completed Specimen: Stool Updated: 08/09/24 1728     C. diff Antigen Negative     C difficile Toxins A+B, EIA Negative     Comment: Testing not recommended for children <24 months old.                 CURRENT/PREVIOUS VISIT EKG  Results for orders placed or performed during the hospital encounter of 08/09/24   EKG 12-lead    Collection Time: 08/09/24 12:52 PM   Result Value Ref Range    QRS Duration 78 ms    [facility] QTC Calculation 433 ms    Narrative    Test Reason : R11.10,    Vent. Rate : 078 BPM     Atrial Rate : 078 BPM     P-R Int : 128 ms          QRS Dur : 078 ms      QT Int : 380 ms       P-R-T Axes : 009 022 022 degrees     QTc Int : 433 ms    Normal sinus rhythm  Normal ECG  Confirmed by Del RENTERIA, Josh Reddy ([id number]) on 8/9/2024 9:06:15 PM    Referred By: SELVIN   SELF           Confirmed By:Josh Mathis MD       Pathology:  7/16/2024:  07/17/2024 RDC/tml     1. RIGHT COLON BIOPSIES:   - MILD CHRONIC INFLAMMATORY CHANGES.   - NEGATIVE FOR ACUTE INFLAMMATORY REACTION.   - NEGATIVE FOR GRANULOMAS.   - WELL-CONTROLLED CMV IMMUNOHISTOCHEMICAL STAIN IS NEGATIVE.   - NEGATIVE FOR DYSPLASIA OR MALIGNANCY.     2. LEFT COLON BIOPSIES:   - CHRONIC COLITIS WITH MILD SUPERFICIAL ACUTE INFLAMMATORY CHANGES.   - WELL-CONTROLLED CMV IMMUNOHISTOCHEMICAL STAIN IS NEGATIVE.   - NEGATIVE FOR GRANULOMAS.   - NEGATIVE FOR DYSPLASIA OR MALIGNANCY.        Significant Imaging: I have reviewed all relevant and available imaging results/findings  within the past 24 hours.    CXR:   No acute cardiopulmonary disease.    CT abdomen/pelvis:  Impression:  Redemonstration of findings suggesting a nonspecific proctocolitis including infectious or inflammatory etiologies.  Overall extent appears mildly increased in length/extent compared to prior examination of 08/04/2024.  No evidence of abscess or free intraperitoneal air. Stable appearing 0.4 cm calcification within the proximal right ureter without evidence of hydronephrosis.  Nonobstructing left-sided nephrolithiasis.     Soft tissue US:  8/10  FINDINGS:  Targeted ultrasound at site of clinical concern of medial left upper extremity, at site of erythema, shows no solid or cystic mass.  Superficial hypoechoic focus measuring up to 6 mm is present near this region, possibly reflecting edema, infected fluid, or scarring.      I spent a total of 55 minutes on the day of the visit. This includes face to face time and non-face to face time preparing to see the patient (eg, review of tests), obtaining and/or reviewing separately obtained history, documenting clinical information in the electronic or other health record, independently interpreting results and communicating results to the patient/family/caregiver, or care coordinator.      Jessica Hammond NP  Date of Service: 08/13/2024      This note was created using [employer] voice recognition software that occasionally misinterpreted phrases or words.

## 2024-08-13 NOTE — TRANSFER OF CARE
"Anesthesia Transfer of Care Note    Patient: Jacoby Jean-Baptiste    Procedure(s) Performed: Procedure(s) (LRB):  SIGMOIDOSCOPY, FLEXIBLE (N/A)    Patient location: PACU    Anesthesia Type: general    Transport from OR: Transported from OR on room air with adequate spontaneous ventilation    Post pain: adequate analgesia    Post assessment: no apparent anesthetic complications    Post vital signs: stable    Level of consciousness: awake and alert    Nausea/Vomiting: no nausea/vomiting    Complications: none    Transfer of care protocol was followed      Last vitals: Visit Vitals  /63 (BP Location: Left arm, Patient Position: Lying)   Pulse 60   Temp 36 °C (96.8 °F) (Tympanic)   Resp 17   Ht 5' 5" (1.651 m)   Wt 121.2 kg (267 lb 3.2 oz)   SpO2 100%   BMI 44.46 kg/m²     "

## 2024-08-13 NOTE — PLAN OF CARE
Problem: Adult Inpatient Plan of Care  Goal: Plan of Care Review  Outcome: Progressing  Goal: Patient-Specific Goal (Individualized)  Outcome: Progressing  Goal: Absence of Hospital-Acquired Illness or Injury  Outcome: Progressing  Goal: Optimal Comfort and Wellbeing  Outcome: Progressing  Goal: Readiness for Transition of Care  Outcome: Progressing     Problem: Bariatric Environmental Safety  Goal: Safety Maintained with Care  Outcome: Progressing     Problem: Infection  Goal: Absence of Infection Signs and Symptoms  Outcome: Progressing     Problem: Sepsis/Septic Shock  Goal: Optimal Coping  Outcome: Progressing  Goal: Absence of Bleeding  Outcome: Progressing  Goal: Blood Glucose Level Within Targeted Range  Outcome: Progressing  Goal: Absence of Infection Signs and Symptoms  Outcome: Progressing  Goal: Optimal Nutrition Intake  Outcome: Progressing     Problem: Acute Kidney Injury/Impairment  Goal: Fluid and Electrolyte Balance  Outcome: Progressing  Goal: Improved Oral Intake  Outcome: Progressing  Goal: Effective Renal Function  Outcome: Progressing     Problem: Diabetes Comorbidity  Goal: Blood Glucose Level Within Targeted Range  Outcome: Progressing     Problem: Wound  Goal: Optimal Coping  Outcome: Progressing  Goal: Optimal Functional Ability  Outcome: Progressing  Goal: Absence of Infection Signs and Symptoms  Outcome: Progressing  Goal: Improved Oral Intake  Outcome: Progressing  Goal: Optimal Pain Control and Function  Outcome: Progressing  Goal: Skin Health and Integrity  Outcome: Progressing  Goal: Optimal Wound Healing  Outcome: Progressing

## 2024-08-13 NOTE — ASSESSMENT & PLAN NOTE
Resolved  Sepsis likely multifactorial with cellulitis and UC flare  Continue with antibiotics per ID recommendation  ID and GI are following

## 2024-08-13 NOTE — ANESTHESIA POSTPROCEDURE EVALUATION
Anesthesia Post Evaluation    Patient: Jacoby Jean-Baptiste    Procedure(s) Performed: Procedure(s) (LRB):  SIGMOIDOSCOPY, FLEXIBLE (N/A)    Final Anesthesia Type: general      Patient location during evaluation: GI PACU  Patient participation: Yes- Able to Participate  Level of consciousness: awake and alert  Post-procedure vital signs: reviewed and stable  Pain management: adequate  Airway patency: patent    PONV status at discharge: No PONV  Anesthetic complications: no      Cardiovascular status: stable  Respiratory status: unassisted  Hydration status: euvolemic  Follow-up not needed.              Vitals Value Taken Time   /63 08/13/24 1313   Temp 36 °C (96.8 °F) 08/13/24 1313   Pulse 69 08/13/24 1328   Resp 17 08/13/24 1313   SpO2 95 % 08/13/24 1328   Vitals shown include unfiled device data.      No case tracking events are documented in the log.      Pain/Kusum Score: Pain Rating Prior to Med Admin: 9 (8/13/2024 10:24 AM)  Pain Rating Post Med Admin: 2 (8/13/2024 10:35 AM)

## 2024-08-14 LAB
ALBUMIN SERPL BCP-MCNC: 3 G/DL (ref 3.5–5.2)
ALP SERPL-CCNC: 43 U/L (ref 55–135)
ALT SERPL W/O P-5'-P-CCNC: 13 U/L (ref 10–44)
ANION GAP SERPL CALC-SCNC: 4 MMOL/L (ref 8–16)
AST SERPL-CCNC: 16 U/L (ref 10–40)
BACTERIA BLD CULT: NORMAL
BACTERIA BLD CULT: NORMAL
BACTERIA SPEC ANAEROBE CULT: NORMAL
BASOPHILS # BLD AUTO: 0.03 K/UL (ref 0–0.2)
BASOPHILS NFR BLD: 0.2 % (ref 0–1.9)
BILIRUB SERPL-MCNC: 0.3 MG/DL (ref 0.1–1)
BUN SERPL-MCNC: 10 MG/DL (ref 6–20)
CALCIUM SERPL-MCNC: 8.2 MG/DL (ref 8.7–10.5)
CHLORIDE SERPL-SCNC: 103 MMOL/L (ref 95–110)
CO2 SERPL-SCNC: 29 MMOL/L (ref 23–29)
CREAT SERPL-MCNC: 1 MG/DL (ref 0.5–1.4)
DIFFERENTIAL METHOD BLD: ABNORMAL
EOSINOPHIL # BLD AUTO: 0 K/UL (ref 0–0.5)
EOSINOPHIL NFR BLD: 0.3 % (ref 0–8)
ERYTHROCYTE [DISTWIDTH] IN BLOOD BY AUTOMATED COUNT: 14.6 % (ref 11.5–14.5)
EST. GFR  (NO RACE VARIABLE): >60 ML/MIN/1.73 M^2
GLUCOSE SERPL-MCNC: 106 MG/DL (ref 70–110)
GLUCOSE SERPL-MCNC: 126 MG/DL (ref 70–110)
GLUCOSE SERPL-MCNC: 318 MG/DL (ref 70–110)
GLUCOSE SERPL-MCNC: 81 MG/DL (ref 70–110)
GRAM STN SPEC: NORMAL
GRAM STN SPEC: NORMAL
HCT VFR BLD AUTO: 36.4 % (ref 40–54)
HGB BLD-MCNC: 11.8 G/DL (ref 14–18)
IMM GRANULOCYTES # BLD AUTO: 0.35 K/UL (ref 0–0.04)
IMM GRANULOCYTES NFR BLD AUTO: 2.7 % (ref 0–0.5)
LYMPHOCYTES # BLD AUTO: 1.7 K/UL (ref 1–4.8)
LYMPHOCYTES NFR BLD: 13.5 % (ref 18–48)
MAGNESIUM SERPL-MCNC: 1.4 MG/DL (ref 1.6–2.6)
MCH RBC QN AUTO: 30.4 PG (ref 27–31)
MCHC RBC AUTO-ENTMCNC: 32.4 G/DL (ref 32–36)
MCV RBC AUTO: 94 FL (ref 82–98)
MONOCYTES # BLD AUTO: 1 K/UL (ref 0.3–1)
MONOCYTES NFR BLD: 7.7 % (ref 4–15)
NEUTROPHILS # BLD AUTO: 9.7 K/UL (ref 1.8–7.7)
NEUTROPHILS NFR BLD: 75.6 % (ref 38–73)
NRBC BLD-RTO: 0 /100 WBC
PLATELET # BLD AUTO: 240 K/UL (ref 150–450)
PMV BLD AUTO: 9.9 FL (ref 9.2–12.9)
POTASSIUM SERPL-SCNC: 4 MMOL/L (ref 3.5–5.1)
PROT SERPL-MCNC: 5.8 G/DL (ref 6–8.4)
RBC # BLD AUTO: 3.88 M/UL (ref 4.6–6.2)
SODIUM SERPL-SCNC: 136 MMOL/L (ref 136–145)
VANCOMYCIN TROUGH SERPL-MCNC: 30.5 UG/ML
WBC # BLD AUTO: 12.81 K/UL (ref 3.9–12.7)

## 2024-08-14 PROCEDURE — 63600175 PHARM REV CODE 636 W HCPCS: Performed by: NURSE PRACTITIONER

## 2024-08-14 PROCEDURE — 25000003 PHARM REV CODE 250: Performed by: NURSE PRACTITIONER

## 2024-08-14 PROCEDURE — 25000003 PHARM REV CODE 250: Performed by: STUDENT IN AN ORGANIZED HEALTH CARE EDUCATION/TRAINING PROGRAM

## 2024-08-14 PROCEDURE — A4216 STERILE WATER/SALINE, 10 ML: HCPCS | Performed by: NURSE PRACTITIONER

## 2024-08-14 PROCEDURE — 80053 COMPREHEN METABOLIC PANEL: CPT | Performed by: NURSE PRACTITIONER

## 2024-08-14 PROCEDURE — 25000003 PHARM REV CODE 250: Performed by: FAMILY MEDICINE

## 2024-08-14 PROCEDURE — 63600175 PHARM REV CODE 636 W HCPCS: Performed by: STUDENT IN AN ORGANIZED HEALTH CARE EDUCATION/TRAINING PROGRAM

## 2024-08-14 PROCEDURE — 12000002 HC ACUTE/MED SURGE SEMI-PRIVATE ROOM

## 2024-08-14 PROCEDURE — 63600175 PHARM REV CODE 636 W HCPCS: Performed by: FAMILY MEDICINE

## 2024-08-14 PROCEDURE — 36415 COLL VENOUS BLD VENIPUNCTURE: CPT | Performed by: NURSE PRACTITIONER

## 2024-08-14 PROCEDURE — 83735 ASSAY OF MAGNESIUM: CPT | Performed by: NURSE PRACTITIONER

## 2024-08-14 PROCEDURE — 36415 COLL VENOUS BLD VENIPUNCTURE: CPT | Performed by: FAMILY MEDICINE

## 2024-08-14 PROCEDURE — 85025 COMPLETE CBC W/AUTO DIFF WBC: CPT | Performed by: NURSE PRACTITIONER

## 2024-08-14 PROCEDURE — 99233 SBSQ HOSP IP/OBS HIGH 50: CPT | Mod: ,,, | Performed by: NURSE PRACTITIONER

## 2024-08-14 PROCEDURE — 80202 ASSAY OF VANCOMYCIN: CPT | Performed by: FAMILY MEDICINE

## 2024-08-14 RX ORDER — DOXYCYCLINE 100 MG/1
100 CAPSULE ORAL EVERY 12 HOURS
Status: DISCONTINUED | OUTPATIENT
Start: 2024-08-14 | End: 2024-08-14

## 2024-08-14 RX ORDER — DOXYCYCLINE 100 MG/1
100 CAPSULE ORAL EVERY 12 HOURS
Status: DISCONTINUED | OUTPATIENT
Start: 2024-08-15 | End: 2024-08-15 | Stop reason: HOSPADM

## 2024-08-14 RX ORDER — TRIAMCINOLONE ACETONIDE 1 MG/G
CREAM TOPICAL 2 TIMES DAILY
Status: DISCONTINUED | OUTPATIENT
Start: 2024-08-14 | End: 2024-08-15 | Stop reason: HOSPADM

## 2024-08-14 RX ADMIN — ZOLPIDEM TARTRATE 10 MG: 5 TABLET, COATED ORAL at 09:08

## 2024-08-14 RX ADMIN — SODIUM CHLORIDE, PRESERVATIVE FREE 10 ML: 5 INJECTION INTRAVENOUS at 11:08

## 2024-08-14 RX ADMIN — PANCRELIPASE 3 CAPSULE: 60000; 12000; 38000 CAPSULE, DELAYED RELEASE PELLETS ORAL at 09:08

## 2024-08-14 RX ADMIN — PANCRELIPASE 3 CAPSULE: 60000; 12000; 38000 CAPSULE, DELAYED RELEASE PELLETS ORAL at 06:08

## 2024-08-14 RX ADMIN — VANCOMYCIN HYDROCHLORIDE 2000 MG: 500 INJECTION, POWDER, LYOPHILIZED, FOR SOLUTION INTRAVENOUS at 10:08

## 2024-08-14 RX ADMIN — HYDROCODONE BITARTRATE AND ACETAMINOPHEN 1 TABLET: 10; 325 TABLET ORAL at 09:08

## 2024-08-14 RX ADMIN — MORPHINE SULFATE 2 MG: 2 INJECTION, SOLUTION INTRAMUSCULAR; INTRAVENOUS at 02:08

## 2024-08-14 RX ADMIN — HYDROCODONE BITARTRATE AND ACETAMINOPHEN 1 TABLET: 10; 325 TABLET ORAL at 10:08

## 2024-08-14 RX ADMIN — ATORVASTATIN CALCIUM 10 MG: 10 TABLET, FILM COATED ORAL at 09:08

## 2024-08-14 RX ADMIN — LORAZEPAM 0.5 MG: 0.5 TABLET ORAL at 09:08

## 2024-08-14 RX ADMIN — BUSPIRONE HYDROCHLORIDE 10 MG: 5 TABLET ORAL at 09:08

## 2024-08-14 RX ADMIN — BUDESONIDE 9 MG: 3 CAPSULE, COATED PELLETS ORAL at 09:08

## 2024-08-14 RX ADMIN — BUSPIRONE HYDROCHLORIDE 10 MG: 5 TABLET ORAL at 02:08

## 2024-08-14 RX ADMIN — CHOLESTYRAMINE LIGHT 4 G: 4 POWDER, FOR SUSPENSION ORAL at 09:08

## 2024-08-14 RX ADMIN — Medication 6 MG: at 09:08

## 2024-08-14 RX ADMIN — VANCOMYCIN HYDROCHLORIDE 125 MG: KIT at 09:08

## 2024-08-14 RX ADMIN — VANCOMYCIN HYDROCHLORIDE 125 MG: KIT at 10:08

## 2024-08-14 RX ADMIN — MORPHINE SULFATE 2 MG: 2 INJECTION, SOLUTION INTRAMUSCULAR; INTRAVENOUS at 07:08

## 2024-08-14 RX ADMIN — Medication 800 MG: at 06:08

## 2024-08-14 RX ADMIN — METOPROLOL SUCCINATE 50 MG: 50 TABLET, FILM COATED, EXTENDED RELEASE ORAL at 09:08

## 2024-08-14 RX ADMIN — HYDROCODONE BITARTRATE AND ACETAMINOPHEN 1 TABLET: 10; 325 TABLET ORAL at 02:08

## 2024-08-14 RX ADMIN — TRIAMCINOLONE ACETONIDE: 1 CREAM TOPICAL at 12:08

## 2024-08-14 RX ADMIN — PREDNISONE 20 MG: 20 TABLET ORAL at 09:08

## 2024-08-14 RX ADMIN — TRIAMCINOLONE ACETONIDE: 1 CREAM TOPICAL at 09:08

## 2024-08-14 RX ADMIN — SERTRALINE HYDROCHLORIDE 100 MG: 50 TABLET ORAL at 09:08

## 2024-08-14 RX ADMIN — PANCRELIPASE 3 CAPSULE: 60000; 12000; 38000 CAPSULE, DELAYED RELEASE PELLETS ORAL at 11:08

## 2024-08-14 RX ADMIN — HYDROCODONE BITARTRATE AND ACETAMINOPHEN 1 TABLET: 10; 325 TABLET ORAL at 06:08

## 2024-08-14 NOTE — PROGRESS NOTES
[facility]   Department of Infectious Disease  Progress Note        PATIENT NAME: Jacoby Jean-Baptiste  MRN: [id number]  TODAY'S DATE: 08/14/2024  ADMIT DATE: 8/9/2024  LOS: 4 days    CHIEF COMPLAINT: Vomiting and Diarrhea (X 1 week)    PRINCIPLE PROBLEM: Sepsis    REASON FOR CONSULT: Colitis and cellulitis, plz advise on abx     INTERVAL HISTORY     8/11:  Patient seen and examined, he is lying in bed, awake, alert, looks a little more comfortable today now that he is tolerating diet.   Unfortunately, continues to have multiple episodes of loose bowel movements, 10 in the last 24 hours, high suspicion for colitis flare.  Discussed with GI, no contraindication to start steroids to see if we can help his UC flare.  Plan for colonoscopy earlier next week.  Patient mentions his left arm is better, less tender, erythema is improving.  Noted to have an open wound with adipose tissue seen, no evidence of purulent drainage noted, will ask the nurse to pack it for now.  Ultrasound left arm no solid or cystic mass.  Superficial hypoechoic focus measuring up to 6 mm, possibly reflecting edema or scaring.  Hemodynamically stable, afebrile.  Adequate urinary output.  Micro reviewed, stool for C diff negative, GI PCR nothing detected, stool culture nothing significant to date.  Blood cultures x2 sets no growth to date, pending final.  Labs reviewed, leukocytosis improved 9.8, left shift 91%, H&H 12.2/37.3, platelet count 250.  Hyponatremia 133, potassium 4.7, NAZIA resolved creatinine 1, AST 8/ALT 5.  Procalcitonin is negative 0.1, CRP 4.2, slightly high likely from underlying UC flare.    8/12/24@1226 (Denette):  He is sitting up in bed awake and alert.  He continues with mild diffuse abdominal pain that he says he was all the time.   Left upper arm is tender to palpation. He had 7 diarrhea stools in the last 24 hours.  He has not noticed any blood in the stool.  He was eating and drinking well with no vomiting since  Friday.  We examined wound in his left arm and erythema is much better there is some induration around incision sites and packing was removed.  Nurses going to replace packing and re-dressed the arm.   He denies fevers or chills, dysuria, headaches or dizziness. A culture was obtained from the wound.  T-max 98° in the last 24 hours.  WBC 14.11 increased, left shift 87%, no bands, creatinine 1.0, creatinine clearance 106, MRSA screen negative, stool culture negative, blood cultures no growth to date, stool for C diff negative.      8/13/24@1213: Patient was endoscopy and was not seen today.   Reviewed cultures no growth to date, blood cultures no growth to date.  Afebrile temperature 98.4°.  WBC 13.95 trending down.  Left shift 86% trending down, no bands, platelets 252, creatinine 0.9.  Creatinine clearance 118.   We will see patient tomorrow.    8/14/24@1041 (Denette):   Patient was sitting up in bed watching television.  He complains of left arm pain.  He has a Mepilex dressing in place and there is erythema from the adhesive.   The wound bed is somewhat indurated about quarter-size, no erythema,, there is packing in place and very little drainage.  A dressing was reapplied.  Diarrhea much improved, still continues with some abdominal tenderness, denies fevers or chills.  He had an endoscopy  yesterday that showed erythematous granular and pseudo polypoid mucosa in the rectum sigmoid colon and descending colon that were Kartik biopsied.  A CMV and pathology were sent.  GI thought that the ulcerative colitis was much improved.  T-max 98.2°.    Anaerobic and aerobic cultures negative from wound.  WBC 12.81 trending down, platelets 240, H&H 11.8/36.4, creatinine 1.0 with creatinine clearance 106.  Vanc trough from today 30.5.    Antibiotics (From admission, onward)      Start     Stop Route Frequency Ordered    08/15/24 0900  doxycycline capsule 100 mg         08/20/24 0859 Oral Every 12 hours 08/14/24 1624    08/14/24  2100  vancomycin oral suspension 125 mg         08/20/24 0859 Oral 2 times daily 08/14/24 1631           Antifungals (From admission, onward)      None           Antivirals (From admission, onward)      None               ASSESSMENT and PLAN     Left arm SSTi, rule out abscess     1.5 g dalbavancin IV on  7/28   Blood cultures x2 sets no growth to date, pending final  Soft tissue US left arm: Superficial hypoechoic focus measuring up to 6 mm is present near this region, possibly reflecting edema, infected fluid, or scarring   CRP 4.2, slightly high   MRSA nasal swab negative    Diarrhea, history of ulcerative colitis on Omvoh (Mirikizumab) last dose - rule out UC flare  Calprotectin: >8000-->2480-->1100  Recent colonoscopy 7/16: CHRONIC COLITIS WITH MILD SUPERFICIAL ACUTE INFLAMMATORY CHANGES.   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 Cover with foam and tegaderm to secure   Needs to follow-up with general surgery and PCP  Triamcinolone cream to irritated area around wound twice daily for 5-7 days  Follow-up with Wound Care  Continue wound care as directed    D/W Dr Chaves    Infectious Disease will sign off. Please send Epic secure chat with any questions.    ANN Currymichelle Jean-Baptiste is a 49 y.o. male morbidly obese, BMI 44.4 kg/m2, known to our service from prior admission in December with cavitary/atypical pneumonia with positive Aspergillus antigen status post voriconazole.  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  He mentions since July 20th he started noticing redness on his left upper arm.  He was seen by surgery on 08/01 status post I&D, unfortunately no cultures were sent.  Upon chart reviewed, he had a recent prescription for levofloxacin and Flagyl which he did not  at the pharmacy.    Patient is complaining of headache, generalized weakness, fatigue, multiple, nonbloody, frequent loose bowel movements, denies any urinary symptoms.  Labs yesterday with leukocytosis 17.2, left shift 78%, H&H 14.3/43.2, platelet count 296   Hyponatremia 134, potassium 4   NAZIA, creatinine 2, down to 1.4 today, LFTs normal   UA 1+ leukocyte esterase, hyaline casts, negative for UTI   Stool for C diff negative     Review of patient's allergies indicates:  No Known Allergies     Antibiotic history:    Dalbavancin 1.5g 7/28  Vancomycin 8/9; 8/12-14   Levofloxacin 8/9   Piperacillin tazobactam 7/9   Cefepime 8/9-10  Metronidazole 8/9-13  Rocephin 8/10-12  Doxycycline 8/14  BID Oral vancomycin 8/10-    Microbiology:    Stool for C diff 8/9 negative   Stool culture 8/10 negative  Blood cultures 8/9 x2 sets no growth to date, pending final   8/12  left arm wound anaerobic culture negative   8/12  left arm wound aerobic culture negative    SUBJECTIVE     Review of systems  Review of systems obtained and negative except as stated above in Interval History     OBJECTIVE   Temp:  [96.8 °F (36 °C)-97.9 °F (36.6 °C)] 97.8 °F (36.6 °C)  Pulse:  [58-76] 65  Resp:  [8-19] 18  SpO2:  [86 %-100 %] 100 %  BP: (100-154)/(60-85) 132/85  Temp:  [96.8 °F (36 °C)-97.9 °F (36.6 °C)]   Temp: 97.8 °F (36.6 °C) (08/14/24  0845)  Pulse: 65 (08/14/24 0910)  Resp: 18 (08/14/24 0845)  BP: 132/85 (08/14/24 0910)  SpO2: 100 % (08/14/24 0845)    Intake/Output Summary (Last 24 hours) at 8/14/2024 0944  Last data filed at 8/14/2024 0855  Gross per 24 hour   Intake 880 ml   Output 6 ml   Net 874 ml       Physical Exam  General: Sitting up in bed,awake and alert, no distress noted.  Eyes: Eyes with no icterus or injection. Vision grossly normal  Mouth: Moist mucous membranes, dentition poor. No ulcerations, erythema or exudates.  Cardiovascular: Regular rate and rhythm, no murmurs, no edema.    Respiratory:  Clear to auscultation bilaterally, no tachypnea or increased work of breathing.  Gastrointestinal:  Soft and obese with active bowel sounds, + tenderness to palpation, no distention.  Musculoskeletal:  Moves all extremities with good strength.    Skin:  Warm and dry, Left arm with improved erythema just around edges of incision site, still somewhat indurated, packing in place no drainage, tender to palpation, there is surrounding erythema that appears to be a rash from adhesive.  Neuro: Oriented, conversant, follows commands.  Psych: Good mood, normal affect.   VAD: Midline placed 08/11/2024 to right basilic  ISOLATION: None    Wounds:   8/13/24          8/12/24        8/11/24      8/10:      7/20:      Significant Labs: All pertinent labs within the past 24 hours have been reviewed.    CBC LAST 7  Recent Labs   Lab 08/09/24  1151 08/10/24  1156 08/11/24  0315 08/12/24  0833 08/13/24  0410 08/14/24  0407   WBC 17.28* 13.75* 9.86 14.11* 13.95* 12.81*   RBC 4.61 4.20* 3.99* 4.05* 3.71* 3.88*   HGB 14.3 12.8* 12.2* 12.2* 11.2* 11.8*   HCT 43.2 40.8 37.3* 38.3* 35.2* 36.4*   MCV 94 97 94 95 95 94   MCH 31.0 30.5 30.6 30.1 30.2 30.4   MCHC 33.1 31.4* 32.7 31.9* 31.8* 32.4   RDW 14.6* 14.8* 14.4 14.6* 14.7* 14.6*    263 250 262 252 240   MPV 10.1 9.7 10.1 9.8 10.1 9.9   GRAN 78.0*  13.5* 79.1*  10.9* 91.0*  9.0* 87.0*  12.3* 86.4*   "12.1* 75.6*  9.7*   LYMPH 11.2*  1.9 10.3*  1.4 6.3*  0.6* 7.4*  1.0 7.3*  1.0 13.5*  1.7   MONO 7.6  1.3* 7.1  1.0 1.6*  0.2* 3.9*  0.6 3.6*  0.5 7.7  1.0   BASO 0.10 0.10 0.02 0.06 0.04 0.03   NRBC 0 0 0 0 0 0       CHEMISTRY LAST 7  Recent Labs   Lab 08/09/24  1151 08/10/24  0936 08/11/24  0315 08/12/24  0833 08/13/24  0410 08/14/24  0407   * 133* 133* 135* 138 136   K 4.0 4.2 4.7 4.4 4.3 4.0    106 105 105 105 103   CO2 20* 17* 18* 22* 25 29   ANIONGAP 11 10 10 8 8 4*   BUN 10 7 5* 7 11 10   CREATININE 2.0* 1.4 1.0 1.0 0.9 1.0   GLU 82 54* 122* 129* 141* 106   CALCIUM 8.8 7.9* 7.9* 8.3* 8.3* 8.2*   MG  --  1.0* 1.4* 1.5* 1.5* 1.4*   ALBUMIN 3.6 3.2* 3.0* 3.3* 3.1* 3.0*   PROT 7.3 6.3 6.2 6.4 6.0 5.8*   ALKPHOS 77 65 59 54* 48* 43*   ALT 6* 5* 5* 7* 10 13   AST 11 11 8* 12 15 16   BILITOT 0.3 0.3 0.2 0.2 0.2 0.3       Estimated Creatinine Clearance: 106.8 mL/min (based on SCr of 1 mg/dL).    INFLAMMATORY/PROCAL  LAST 7  Recent Labs   Lab 08/11/24  0315   PROCAL 0.166   CRP 4.20*     No results found for: "ESR"  CRP   Date Value Ref Range Status   08/11/2024 4.20 (H) <1.00 mg/dL Final     Comment:     CRP-Normal Application expected values:   <1.0        mg/dL   Normal Range  1.0 - 5.0  mg/dL   Indicates mild inflammation  5.0 - 10.0 mg/dL   Indicates severe inflammation  >10.0        mg/dL   Represents serious processes and   frequently         indicates the presence of a bacterial   infection.      07/15/2024 1.80 (H) <1.00 mg/dL Final     Comment:     CRP-Normal Application expected values:   <1.0        mg/dL   Normal Range  1.0 - 5.0  mg/dL   Indicates mild inflammation  5.0 - 10.0 mg/dL   Indicates severe inflammation  >10.0        mg/dL   Represents serious processes and   frequently         indicates the presence of a bacterial   infection.      07/12/2024 1.30 (H) <1.00 mg/dL Final     Comment:     CRP-Normal Application expected values:   <1.0        mg/dL   Normal Range  1.0 - " 5.0  mg/dL   Indicates mild inflammation  5.0 - 10.0 mg/dL   Indicates severe inflammation  >10.0        mg/dL   Represents serious processes and   frequently         indicates the presence of a bacterial   infection.      06/03/2024 6.90 (H) <1.00 mg/dL Final     Comment:     CRP-Normal Application expected values:   <1.0        mg/dL   Normal Range  1.0 - 5.0  mg/dL   Indicates mild inflammation  5.0 - 10.0 mg/dL   Indicates severe inflammation  >10.0        mg/dL   Represents serious processes and   frequently         indicates the presence of a bacterial   infection.      11/30/2023 61.6 (H) 0.0 - 8.2 mg/L Final   11/22/2023 187.6 (H) 0.0 - 8.2 mg/L Final   11/20/2023 265.5 (H) 0.0 - 8.2 mg/L Final       PRIOR  MICROBIOLOGY:    Susceptibility data from last 90 days.  Collected Specimen Info Organism   07/28/24 Blood from Peripheral, Antecubital, Right No growth after 5 days.   07/28/24 Blood from Peripheral, Antecubital, Left No growth after 5 days.   07/15/24 Blood from Peripheral, Antecubital, Left No growth after 5 days.   07/15/24 Blood from Peripheral, Antecubital, Right No growth after 5 days.         LAST 7 DAYS MICROBIOLOGY   Microbiology Results (last 7 days)       Procedure Component Value Units Date/Time    Blood culture x two cultures. Draw prior to antibiotics. [[id number]] Collected: 08/09/24 1409    Order Status: Completed Specimen: Blood Updated: 08/13/24 1632     Blood Culture, Routine No Growth to date      No Growth to date      No Growth to date      No Growth to date      No Growth to date    Narrative:      Aerobic and anaerobic    Blood culture x two cultures. Draw prior to antibiotics. [[id number]] Collected: 08/09/24 1544    Order Status: Completed Specimen: Blood Updated: 08/13/24 1632     Blood Culture, Routine No Growth to date      No Growth to date      No Growth to date      No Growth to date      No Growth to date    Narrative:      Aerobic and anaerobic  Collection has been  rescheduled by CZB at 08/09/2024 15:00 Reason:   Unable to collect  Collection has been rescheduled by CZB at 08/09/2024 15:00 Reason:   Unable to collect    Gram stain [[id number]] Collected: 08/12/24 1237    Order Status: Completed Specimen: Wound from Arm Updated: 08/13/24 0845     Gram Stain Result Few WBC's      No organisms seen    Aerobic culture [[id number]] Collected: 08/12/24 1237    Order Status: Completed Specimen: Abscess from Arm, Left Updated: 08/13/24 0803     Aerobic Bacterial Culture No growth    Gram stain [[id number]]     Order Status: Completed Specimen: Wound from Arm, Left     Culture, Anaerobe [[id number]] Collected: 08/12/24 1237    Order Status: Sent Specimen: Abscess from Arm, Left Updated: 08/12/24 1240    Stool culture [[id number]] Collected: 08/10/24 1210    Order Status: Completed Specimen: Stool Updated: 08/12/24 0849     Stool Culture No Salmonella,Shigella,Vibrio,Campylobacter.      No E coli 0157:H7 isolated.    MRSA Screen by PCR [[id number]] Collected: 08/10/24 1650    Order Status: Completed Specimen: Nasal Swab Updated: 08/10/24 1829     MRSA SCREEN BY PCR Negative    Clostridium difficile EIA [[id number]] Collected: 08/09/24 1439    Order Status: Completed Specimen: Stool Updated: 08/09/24 1728     C. diff Antigen Negative     C difficile Toxins A+B, EIA Negative     Comment: Testing not recommended for children <24 months old.                 CURRENT/PREVIOUS VISIT EKG  Results for orders placed or performed during the hospital encounter of 08/09/24   EKG 12-lead    Collection Time: 08/09/24 12:52 PM   Result Value Ref Range    QRS Duration 78 ms    [facility] QTC Calculation 433 ms    Narrative    Test Reason : R11.10,    Vent. Rate : 078 BPM     Atrial Rate : 078 BPM     P-R Int : 128 ms          QRS Dur : 078 ms      QT Int : 380 ms       P-R-T Axes : 009 022 022 degrees     QTc Int : 433 ms    Normal sinus rhythm  Normal ECG  Confirmed by Josh Mathis MD ([id number]) on  8/9/2024 9:06:15 PM    Referred By: SELVIN   SELF           Confirmed By:Josh Mathis MD       Pathology:  7/16/2024:  07/17/2024 RDC/tml     1. RIGHT COLON BIOPSIES:   - MILD CHRONIC INFLAMMATORY CHANGES.   - NEGATIVE FOR ACUTE INFLAMMATORY REACTION.   - NEGATIVE FOR GRANULOMAS.   - WELL-CONTROLLED CMV IMMUNOHISTOCHEMICAL STAIN IS NEGATIVE.   - NEGATIVE FOR DYSPLASIA OR MALIGNANCY.     2. LEFT COLON BIOPSIES:   - CHRONIC COLITIS WITH MILD SUPERFICIAL ACUTE INFLAMMATORY CHANGES.   - WELL-CONTROLLED CMV IMMUNOHISTOCHEMICAL STAIN IS NEGATIVE.   - NEGATIVE FOR GRANULOMAS.   - NEGATIVE FOR DYSPLASIA OR MALIGNANCY.        Significant Imaging: I have reviewed all relevant and available imaging results/findings within the past 24 hours.    CXR:   No acute cardiopulmonary disease.    CT abdomen/pelvis:  Impression:  Redemonstration of findings suggesting a nonspecific proctocolitis including infectious or inflammatory etiologies.  Overall extent appears mildly increased in length/extent compared to prior examination of 08/04/2024.  No evidence of abscess or free intraperitoneal air. Stable appearing 0.4 cm calcification within the proximal right ureter without evidence of hydronephrosis.  Nonobstructing left-sided nephrolithiasis.     Soft tissue US:  8/10  FINDINGS:  Targeted ultrasound at site of clinical concern of medial left upper extremity, at site of erythema, shows no solid or cystic mass.  Superficial hypoechoic focus measuring up to 6 mm is present near this region, possibly reflecting edema, infected fluid, or scarring.      I spent a total of 55 minutes on the day of the visit. This includes face to face time and non-face to face time preparing to see the patient (eg, review of tests), obtaining and/or reviewing separately obtained history, documenting clinical information in the electronic or other health record, independently interpreting results and communicating results to the  patient/family/caregiver, or care coordinator.      Jessica Hammond NP  Date of Service: 08/14/2024      This note was created using M [employer] voice recognition software that occasionally misinterpreted phrases or words.

## 2024-08-14 NOTE — CONSULTS
2 small incision inner left arm, top incision pin point opening.  Open incision 0.4x0.3x2cm periwound red indurated, 2cm undermining inner circumference of wound.  No active drainage.  Strip gauze packing removed.  Irrigated with wound cleanser, dried and packed with aquacel ag. Covered with foam and tegaderm to secure

## 2024-08-14 NOTE — PROGRESS NOTES
[facility] Medicine  Progress Note    Patient Name: Jacoby Jean-Baptiste  MRN: [id number]  Patient Class: IP- Inpatient   Admission Date: 8/9/2024  Length of Stay: 4 days  Attending Physician: Matteo Melendez DO  Primary Care Provider: Hunter Aguilar III, MD        Subjective:     Principal Problem:Sepsis        HPI:  48 y/o male with pMHx of 2 DM, C difficile colitis, ulcerative colitis, cavitary pneumonia, HLD, and hypertension who presented to the ED with complaints of vomiting and diarrhea x1 week.  Patient was seen in the ED on 08/05/2024 with similar symptoms and went home to take his Levaquin and Flagyl that was previously prescribed and did not fill the prescriptions.  Today he made decision to return to the ED with worsening symptoms.  Patient is also with left upper arm site of previous I and D of abscess on oral Bactrim.  We will stop Bactrim and consult wound care for topical treatment as patient is on systemic antibiotics with no change.  On arrival patient with a WBCs 17.2, sodium 134, CO2 20, creatinine 2.0, lactic 1.69.  CTA abdomen and pelvis with IV contrast redemonstration of findings suggesting a nonspecific proctocolitis.  Patient was given 3 L of fluid in the ED he was tested for C difficile and found to be negative for antigens and toxins.  Patient will be admitted for IV antibiotic treatment.    Overview/Hospital Course:  Jacoby Jean-Baptiste was admitted to the service of hospital medicine for treatment of cellulitis/abscess to E and suspected UC flare.  He was given IVFH and started on IV abx.  GI and ID were consulted.  Abx managed by ID.  Pain was controlled with IV and oral narcotics.  He required IV antiemetics.  Nausea improved to his baseline.  Diet was advanced as tolerated.    Interval History: Patient said he feels slightly better today and he also had bowl movement this morning    Review of Systems   All other systems reviewed and are negative.    Objective:      Vital Signs (Most Recent):  Temp: 97.2 °F (36.2 °C) (08/14/24 1656)  Pulse: 62 (08/14/24 1656)  Resp: 19 (08/14/24 1656)  BP: 138/68 (08/14/24 1656)  SpO2: 99 % (08/14/24 1656) Vital Signs (24h Range):  Temp:  [97.2 °F (36.2 °C)-97.9 °F (36.6 °C)] 97.2 °F (36.2 °C)  Pulse:  [62-76] 62  Resp:  [16-19] 19  SpO2:  [97 %-100 %] 99 %  BP: (131-145)/(68-85) 138/68     Weight: 121.2 kg (267 lb 3.2 oz)  Body mass index is 44.46 kg/m².    Intake/Output Summary (Last 24 hours) at 8/14/2024 1825  Last data filed at 8/14/2024 1657  Gross per 24 hour   Intake 480 ml   Output 1 ml   Net 479 ml         Physical Exam  Constitutional:       Appearance: He is obese.   Cardiovascular:      Rate and Rhythm: Normal rate.      Pulses: Normal pulses.   Abdominal:      Tenderness: There is no abdominal tenderness.   Skin:     Comments: Wound: Refer to imaging upper extremity left   Neurological:      Mental Status: He is alert and oriented to person, place, and time.             Significant Labs: All pertinent labs within the past 24 hours have been reviewed.    Significant Imaging: I have reviewed all pertinent imaging results/findings within the past 24 hours.    Assessment/Plan:      * Sepsis  Improving  Sepsis likely multifactorial with cellulitis and UC flare  Continue with antibiotics per ID recommendation  ID and GI are following      Ulcerative colitis  Flare suspected  Continue with steroids and pain control  Started on budesonide 9 mg daily and 2 weeks of steroid 20 mg daily on 8/13 per GI recommendations  S/p endoscopy and sigmoidoscopy on 8/13        Skin abscess  Follow up on wound culture  Wound and ID are following  Continue with antibiotics per ID recommendations    Acute renal failure  NAZIA is likely due to pre-renal azotemia due to dehydration. Baseline creatinine is  1.2 . Most recent creatinine and eGFR are listed below.  Recent Labs     08/09/24  1151 08/10/24  0936 08/11/24  0315   CREATININE 2.0* 1.4 1.0  "  EGFRNORACEVR 39.9* >60.0 >60.0        Plan  - NAZIA has resolved. Continue to monitor renal function.  Labs not collected this morning for some reason.  Re-ordered and awaiting results.    Proctocolitis  See under UC          Type 2 diabetes mellitus without complication, without long-term current use of insulin  Patient's FSGs are controlled on current medication regimen.  Last A1c reviewed-   Lab Results   Component Value Date    HGBA1C 5.2 06/06/2024     Most recent fingerstick glucose reviewed- No results for input(s): "POCTGLUCOSE" in the last 24 hours.  Current correctional scale  Low  Maintain anti-hyperglycemic dose as follows-   Antihyperglycemics (From admission, onward)      Start     Stop Route Frequency Ordered    08/09/24 2137  insulin aspart U-100 pen 0-5 Units         -- SubQ Before meals & nightly PRN 08/09/24 2038          Hold Oral hypoglycemics while patient is in the hospital.  Hold metformin, Ozempic-- start low dose SSI    Morbid obesity  Body mass index is 43.93 kg/m². Morbid obesity complicates all aspects of disease management from diagnostic modalities to treatment. Weight loss encouraged and health benefits explained to patient.           VTE Risk Mitigation (From admission, onward)           Ordered     Reason for No Pharmacological VTE Prophylaxis  Once        Question:  Reasons:  Answer:  Risk of Bleeding  Comment:  ulcerative colitis patient    08/09/24 2038     IP VTE HIGH RISK PATIENT  Once         08/09/24 2038     Place sequential compression device  Until discontinued         08/09/24 2038                    Discharge Planning   GERARDO: 8/15/2024     Code Status: Full Code   Is the patient medically ready for discharge?:     Reason for patient still in hospital (select all that apply): Patient trending condition  Discharge Plan A: Home with family   Discharge Delays: (!) Procedure Scheduling (IR, OR, Labs, Echo, Cath, Echo, EEG)              Matteo Melendez DO  Department of Hospital " Medicine   [facility]

## 2024-08-15 ENCOUNTER — PATIENT MESSAGE (OUTPATIENT)
Dept: FAMILY MEDICINE | Facility: CLINIC | Age: 49
End: 2024-08-15
Payer: COMMERCIAL

## 2024-08-15 VITALS
HEIGHT: 65 IN | DIASTOLIC BLOOD PRESSURE: 90 MMHG | SYSTOLIC BLOOD PRESSURE: 150 MMHG | TEMPERATURE: 98 F | BODY MASS INDEX: 44.52 KG/M2 | HEART RATE: 81 BPM | OXYGEN SATURATION: 99 % | WEIGHT: 267.19 LBS | RESPIRATION RATE: 17 BRPM

## 2024-08-15 LAB
ALBUMIN SERPL BCP-MCNC: 3 G/DL (ref 3.5–5.2)
ALP SERPL-CCNC: 44 U/L (ref 55–135)
ALT SERPL W/O P-5'-P-CCNC: 14 U/L (ref 10–44)
ANION GAP SERPL CALC-SCNC: 3 MMOL/L (ref 8–16)
AST SERPL-CCNC: 14 U/L (ref 10–40)
BASOPHILS # BLD AUTO: 0.04 K/UL (ref 0–0.2)
BASOPHILS NFR BLD: 0.3 % (ref 0–1.9)
BILIRUB SERPL-MCNC: 0.3 MG/DL (ref 0.1–1)
BUN SERPL-MCNC: 11 MG/DL (ref 6–20)
CALCIUM SERPL-MCNC: 8.4 MG/DL (ref 8.7–10.5)
CHLORIDE SERPL-SCNC: 104 MMOL/L (ref 95–110)
CO2 SERPL-SCNC: 33 MMOL/L (ref 23–29)
CREAT SERPL-MCNC: 1 MG/DL (ref 0.5–1.4)
DIFFERENTIAL METHOD BLD: ABNORMAL
EOSINOPHIL # BLD AUTO: 0.2 K/UL (ref 0–0.5)
EOSINOPHIL NFR BLD: 1.2 % (ref 0–8)
ERYTHROCYTE [DISTWIDTH] IN BLOOD BY AUTOMATED COUNT: 14.6 % (ref 11.5–14.5)
EST. GFR  (NO RACE VARIABLE): >60 ML/MIN/1.73 M^2
GLUCOSE SERPL-MCNC: 115 MG/DL (ref 70–110)
GLUCOSE SERPL-MCNC: 138 MG/DL (ref 70–110)
GLUCOSE SERPL-MCNC: 87 MG/DL (ref 70–110)
GLUCOSE SERPL-MCNC: 89 MG/DL (ref 70–110)
HCT VFR BLD AUTO: 37.6 % (ref 40–54)
HGB BLD-MCNC: 11.9 G/DL (ref 14–18)
IMM GRANULOCYTES # BLD AUTO: 0.28 K/UL (ref 0–0.04)
IMM GRANULOCYTES NFR BLD AUTO: 2.1 % (ref 0–0.5)
LYMPHOCYTES # BLD AUTO: 2.4 K/UL (ref 1–4.8)
LYMPHOCYTES NFR BLD: 17.7 % (ref 18–48)
MAGNESIUM SERPL-MCNC: 1.4 MG/DL (ref 1.6–2.6)
MCH RBC QN AUTO: 30.3 PG (ref 27–31)
MCHC RBC AUTO-ENTMCNC: 31.6 G/DL (ref 32–36)
MCV RBC AUTO: 96 FL (ref 82–98)
MONOCYTES # BLD AUTO: 1.2 K/UL (ref 0.3–1)
MONOCYTES NFR BLD: 8.5 % (ref 4–15)
NEUTROPHILS # BLD AUTO: 9.6 K/UL (ref 1.8–7.7)
NEUTROPHILS NFR BLD: 70.2 % (ref 38–73)
NRBC BLD-RTO: 0 /100 WBC
PLATELET # BLD AUTO: 239 K/UL (ref 150–450)
PMV BLD AUTO: 9.6 FL (ref 9.2–12.9)
POTASSIUM SERPL-SCNC: 4 MMOL/L (ref 3.5–5.1)
PROT SERPL-MCNC: 5.7 G/DL (ref 6–8.4)
RBC # BLD AUTO: 3.93 M/UL (ref 4.6–6.2)
SODIUM SERPL-SCNC: 140 MMOL/L (ref 136–145)
WBC # BLD AUTO: 13.59 K/UL (ref 3.9–12.7)

## 2024-08-15 PROCEDURE — 25000003 PHARM REV CODE 250: Performed by: FAMILY MEDICINE

## 2024-08-15 PROCEDURE — 25000003 PHARM REV CODE 250: Performed by: NURSE PRACTITIONER

## 2024-08-15 PROCEDURE — 36415 COLL VENOUS BLD VENIPUNCTURE: CPT | Performed by: NURSE PRACTITIONER

## 2024-08-15 PROCEDURE — 80053 COMPREHEN METABOLIC PANEL: CPT | Performed by: NURSE PRACTITIONER

## 2024-08-15 PROCEDURE — 85025 COMPLETE CBC W/AUTO DIFF WBC: CPT | Performed by: NURSE PRACTITIONER

## 2024-08-15 PROCEDURE — 83735 ASSAY OF MAGNESIUM: CPT | Performed by: NURSE PRACTITIONER

## 2024-08-15 PROCEDURE — 63600175 PHARM REV CODE 636 W HCPCS: Performed by: FAMILY MEDICINE

## 2024-08-15 RX ORDER — BUDESONIDE 3 MG/1
9 CAPSULE, COATED PELLETS ORAL DAILY
Qty: 90 CAPSULE | Refills: 0 | Status: SHIPPED | OUTPATIENT
Start: 2024-08-16 | End: 2024-09-15

## 2024-08-15 RX ORDER — DOXYCYCLINE 100 MG/1
100 CAPSULE ORAL EVERY 12 HOURS
Qty: 8 CAPSULE | Refills: 0 | Status: SHIPPED | OUTPATIENT
Start: 2024-08-15 | End: 2024-08-19

## 2024-08-15 RX ORDER — TRIAMCINOLONE ACETONIDE 1 MG/G
CREAM TOPICAL 2 TIMES DAILY
Qty: 15 G | Refills: 0 | Status: SHIPPED | OUTPATIENT
Start: 2024-08-15 | End: 2024-08-22

## 2024-08-15 RX ORDER — PREDNISONE 20 MG/1
20 TABLET ORAL DAILY
Qty: 12 TABLET | Refills: 0 | Status: SHIPPED | OUTPATIENT
Start: 2024-08-16 | End: 2024-08-28

## 2024-08-15 RX ADMIN — HYDROCODONE BITARTRATE AND ACETAMINOPHEN 1 TABLET: 10; 325 TABLET ORAL at 01:08

## 2024-08-15 RX ADMIN — BUDESONIDE 9 MG: 3 CAPSULE, COATED PELLETS ORAL at 09:08

## 2024-08-15 RX ADMIN — PANCRELIPASE 3 CAPSULE: 60000; 12000; 38000 CAPSULE, DELAYED RELEASE PELLETS ORAL at 08:08

## 2024-08-15 RX ADMIN — PREDNISONE 20 MG: 20 TABLET ORAL at 08:08

## 2024-08-15 RX ADMIN — TRIAMCINOLONE ACETONIDE: 1 CREAM TOPICAL at 08:08

## 2024-08-15 RX ADMIN — SERTRALINE HYDROCHLORIDE 100 MG: 50 TABLET ORAL at 08:08

## 2024-08-15 RX ADMIN — ATORVASTATIN CALCIUM 10 MG: 10 TABLET, FILM COATED ORAL at 08:08

## 2024-08-15 RX ADMIN — HYDROCODONE BITARTRATE AND ACETAMINOPHEN 1 TABLET: 10; 325 TABLET ORAL at 05:08

## 2024-08-15 RX ADMIN — CHOLESTYRAMINE LIGHT 4 G: 4 POWDER, FOR SUSPENSION ORAL at 08:08

## 2024-08-15 RX ADMIN — BUSPIRONE HYDROCHLORIDE 10 MG: 5 TABLET ORAL at 08:08

## 2024-08-15 RX ADMIN — VANCOMYCIN HYDROCHLORIDE 125 MG: KIT at 09:08

## 2024-08-15 RX ADMIN — METOPROLOL SUCCINATE 50 MG: 50 TABLET, FILM COATED, EXTENDED RELEASE ORAL at 08:08

## 2024-08-15 RX ADMIN — DOXYCYCLINE HYCLATE 100 MG: 100 CAPSULE ORAL at 08:08

## 2024-08-15 NOTE — PLAN OF CARE
Patient cleared for discharge by case management to home, no needs.       08/15/24 1017   Final Note   Assessment Type Final Discharge Note   Anticipated Discharge Disposition Home   Hospital Resources/Appts/Education Provided Appointments scheduled and added to AVS

## 2024-08-15 NOTE — DISCHARGE SUMMARY
[facility] Medicine  Discharge Summary      Patient Name: Jacoby Jean-Baptiste  MRN: [id number]  [facility]: [id number]  Patient Class: IP- Inpatient  Admission Date: 8/9/2024  Hospital Length of Stay: 5 days  Discharge Date and Time:  08/15/2024 5:42 PM  Attending Physician: No att. providers found   Discharging Provider: Matteo Melendez DO  Primary Care Provider: Hunter Aguilar III, MD    Primary Care Team: Networked reference to record PCT     HPI:   48 y/o male with pMHx of 2 DM, C difficile colitis, ulcerative colitis, cavitary pneumonia, HLD, and hypertension who presented to the ED with complaints of vomiting and diarrhea x1 week.  Patient was seen in the ED on 08/05/2024 with similar symptoms and went home to take his Levaquin and Flagyl that was previously prescribed and did not fill the prescriptions.  Today he made decision to return to the ED with worsening symptoms.  Patient is also with left upper arm site of previous I and D of abscess on oral Bactrim.  We will stop Bactrim and consult wound care for topical treatment as patient is on systemic antibiotics with no change.  On arrival patient with a WBCs 17.2, sodium 134, CO2 20, creatinine 2.0, lactic 1.69.  CTA abdomen and pelvis with IV contrast redemonstration of findings suggesting a nonspecific proctocolitis.  Patient was given 3 L of fluid in the ED he was tested for C difficile and found to be negative for antigens and toxins.  Patient will be admitted for IV antibiotic treatment.    Procedure(s) (LRB):  SIGMOIDOSCOPY, FLEXIBLE (N/A)      Hospital Course:   Jacoby Jean-Baptiste was admitted to the service of hospital medicine for treatment of cellulitis/abscess to LUE and suspected UC flare.  He was given IVFH and started on IV abx.  GI and ID were consulted.  Abx managed by ID.  Pain was controlled with IV and oral narcotics.  He required IV antiemetics.  Nausea improved to his baseline.  Diet was advanced as tolerated.  Patient was  discharged with doxycycline per ID recommendation and discharge with prednisone in addition to daily budesonide per GI recommendation.  Patient verbalized understanding to follow up with providers including GI, wound and PCP     Goals of Care Treatment Preferences:  Code Status: Full Code      SDOH Screening:  The patient was screened for utility difficulties, food insecurity, transport difficulties, housing insecurity, and interpersonal safety and there were no concerns identified this admission.     Consults:   Consults (From admission, onward)          Status Ordering Provider     Inpatient consult to Midline team  Once        Provider:  (Not yet assigned)    Completed JUVENCIO TORRES     Inpatient consult to Infectious Diseases  Once        Provider:  Ophelia Kramer MD    Completed VAHID GALLARDO     Inpatient consult to Gastroenterology  Once        Provider:  Enoc Kebede MD    Completed VAHID GALLARDO            No new Assessment & Plan notes have been filed under this hospital service since the last note was generated.  Service: Hospital Medicine    Final Active Diagnoses:    Diagnosis Date Noted POA    PRINCIPAL PROBLEM:  Sepsis [A41.9] 08/09/2024 Yes    Ulcerative colitis [K51.90] 04/20/2023 Yes     Chronic    Skin abscess [L02.91] 08/09/2024 Yes    Acute renal failure [N17.9] 08/09/2024 Yes    Proctocolitis [K52.9] 08/04/2024 Yes    Type 2 diabetes mellitus without complication, without long-term current use of insulin [E11.9] 01/29/2022 Yes    Morbid obesity [E66.01] 02/03/2021 Yes      Problems Resolved During this Admission:       Discharged Condition: fair    Disposition: Home or Self Care    Follow Up:   Follow-up Information       Paul Jimenes,  Follow up.    Specialty: Wound Care  Contact information:  [address]  [facility] [place] [address]  [phone]               Hunter Aguilar III, MD Follow up on 8/20/2024.    Specialty: Family Medicine  Why: @ 9:30  am  Contact information:  [address]  [address]  [place] [address] [phone]               Enoc Kebede MD Follow up in 1 week(s).    Specialty: Gastroenterology  Why: Patient to call office to schedule follow up visit.  Contact information:  [address]  [place] [address]                           Patient Instructions:      Ambulatory referral/consult to Wound Clinic   Standing Status: Future   Referral Priority: Routine Referral Type: Consultation   Referral Reason: Specialty Services Required   Referred to Provider: VERNON BAJWA Requested Specialty: Wound Care   Number of Visits Requested: 1     Ambulatory referral/consult to Outpatient Case Management   Referral Priority: Routine Referral Type: Consultation   Referral Reason: Specialty Services Required   Number of Visits Requested: 1     Ambulatory referral/consult to General Surgery   Standing Status: Future   Referral Priority: Routine Referral Type: Consultation   Referral Reason: Specialty Services Required   Requested Specialty: General Surgery   Number of Visits Requested: 1       Significant Diagnostic Studies: N/A    Pending Diagnostic Studies:       None           Medications:  None    Indwelling Lines/Drains at time of discharge:   Lines/Drains/Airways       None                   Time spent on the discharge of patient: 32 minutes         Matteo Melendez DO  Department of Hospital Medicine  [facility]

## 2024-08-16 ENCOUNTER — PATIENT OUTREACH (OUTPATIENT)
Dept: ADMINISTRATIVE | Facility: CLINIC | Age: 49
End: 2024-08-16
Payer: COMMERCIAL

## 2024-08-16 ENCOUNTER — OFFICE VISIT (OUTPATIENT)
Dept: FAMILY MEDICINE | Facility: CLINIC | Age: 49
End: 2024-08-16
Payer: COMMERCIAL

## 2024-08-16 VITALS
WEIGHT: 270.94 LBS | HEART RATE: 76 BPM | SYSTOLIC BLOOD PRESSURE: 134 MMHG | TEMPERATURE: 99 F | BODY MASS INDEX: 45.14 KG/M2 | HEIGHT: 65 IN | OXYGEN SATURATION: 96 % | DIASTOLIC BLOOD PRESSURE: 86 MMHG

## 2024-08-16 DIAGNOSIS — L03.114 CELLULITIS OF LEFT UPPER ARM: Primary | ICD-10-CM

## 2024-08-16 DIAGNOSIS — K51.911 ULCERATIVE COLITIS WITH RECTAL BLEEDING, UNSPECIFIED LOCATION: Chronic | ICD-10-CM

## 2024-08-16 DIAGNOSIS — Z79.60 LONG-TERM USE OF IMMUNOSUPPRESSANT MEDICATION: Primary | ICD-10-CM

## 2024-08-16 DIAGNOSIS — L02.413 ABSCESS OF RIGHT ARM: ICD-10-CM

## 2024-08-16 LAB — BACTERIA SPEC AEROBE CULT: NO GROWTH

## 2024-08-16 PROCEDURE — 99999 PR PBB SHADOW E&M-EST. PATIENT-LVL IV: CPT | Mod: PBBFAC,,, | Performed by: FAMILY MEDICINE

## 2024-08-16 PROCEDURE — 3079F DIAST BP 80-89 MM HG: CPT | Mod: CPTII,S$GLB,, | Performed by: FAMILY MEDICINE

## 2024-08-16 PROCEDURE — 3075F SYST BP GE 130 - 139MM HG: CPT | Mod: CPTII,S$GLB,, | Performed by: FAMILY MEDICINE

## 2024-08-16 PROCEDURE — 99213 OFFICE O/P EST LOW 20 MIN: CPT | Mod: S$GLB,,, | Performed by: FAMILY MEDICINE

## 2024-08-16 PROCEDURE — 3072F LOW RISK FOR RETINOPATHY: CPT | Mod: CPTII,S$GLB,, | Performed by: FAMILY MEDICINE

## 2024-08-16 PROCEDURE — 3044F HG A1C LEVEL LT 7.0%: CPT | Mod: CPTII,S$GLB,, | Performed by: FAMILY MEDICINE

## 2024-08-16 PROCEDURE — 1160F RVW MEDS BY RX/DR IN RCRD: CPT | Mod: CPTII,S$GLB,, | Performed by: FAMILY MEDICINE

## 2024-08-16 PROCEDURE — 1111F DSCHRG MED/CURRENT MED MERGE: CPT | Mod: CPTII,S$GLB,, | Performed by: FAMILY MEDICINE

## 2024-08-16 PROCEDURE — 1159F MED LIST DOCD IN RCRD: CPT | Mod: CPTII,S$GLB,, | Performed by: FAMILY MEDICINE

## 2024-08-16 PROCEDURE — 3008F BODY MASS INDEX DOCD: CPT | Mod: CPTII,S$GLB,, | Performed by: FAMILY MEDICINE

## 2024-08-16 RX ORDER — HYDROCODONE BITARTRATE AND ACETAMINOPHEN 10; 325 MG/1; MG/1
1 TABLET ORAL EVERY 6 HOURS PRN
COMMUNITY
End: 2024-08-16 | Stop reason: SDUPTHER

## 2024-08-16 RX ORDER — HYDROCODONE BITARTRATE AND ACETAMINOPHEN 10; 325 MG/1; MG/1
1 TABLET ORAL EVERY 6 HOURS PRN
Qty: 28 TABLET | Refills: 0 | Status: SHIPPED | OUTPATIENT
Start: 2024-08-16

## 2024-08-16 NOTE — PROGRESS NOTES
SCRIBE #1 NOTE: I, Lennie Mendoza, am scribing for, and in the presence of, Hunter Aguilar III, MD. I have scribed the entire note.     Subjective:       Patient ID: Jacoby Jean-Baptiste is a 49 y.o. male.    Chief Complaint: Dressing Change    Jacoby Jean-Baptiste is a 49 y.o. male who presents for a wound follow-up. Patient was evaluated at [facility] ER on 7/28/24 for an abscess to the right arm. An I&D was done with no drainage during ER visit however a drain was placed. He was prescribed doxycycline. He followed up at this clinic with Joanna De La Torre NP on 8/1/24 and was prescribed Bactrim. Today he states the redness is improving but continues to have pain. Hypertension. Cardiovascular good. No chest pain. No palpitations. Long-term use of immunosuppressant medication. Ulcerative colitis with rectal bleeding.          Review of Systems   Constitutional: Negative.    HENT: Negative.     Eyes: Negative.    Respiratory: Negative.     Cardiovascular: Negative.  Negative for chest pain and palpitations.   Gastrointestinal: Negative.    Endocrine: Negative.    Genitourinary: Negative.    Musculoskeletal: Negative.    Skin:  Positive for wound.   Neurological: Negative.    Hematological: Negative.    Psychiatric/Behavioral: Negative.     All other systems reviewed and are negative.      Objective:      Physical examination: Vital signs noted. No acute distress. No carotid bruit. Regular heart rate and rhythm. Lungs clear to auscultation bilaterally. Abdomen bowel sounds are positive soft and nontender. Extremities without edema. 2+ pedal pulses. Cellulitis with a fluctant abscess to the right upper arm. Erythema is improving but still present. Small puncture sites open and packed.   Abscess cavity medial proximal left arm just below axilla.  Two puncture sites there.  The inferior 1 contains packing about 24 in of packing removed from this some purulent material on the packing but no liquid expressed from the wound.  Small amount of  packing was placed in the opening of the wound to keep it open.      Assessment:       1. Long-term use of immunosuppressant medication    2. Ulcerative colitis with rectal bleeding, unspecified location    3. Abscess of right arm        Plan:       Long-term use of immunosuppressant medication    Ulcerative colitis with rectal bleeding, unspecified location    Abscess of right arm    Other orders  -     HYDROcodone-acetaminophen (NORCO)  mg per tablet; Take 1 tablet by mouth every 6 (six) hours as needed for Pain.  Dispense: 28 tablet; Refill: 0    Keep wound clean and dry.  Continue the antibiotics.  Needed pain medication today for the wound.  Hydrocodone acetaminophen 10/325 q.6 hours p.r.n. pain.  Twenty-eight pills.    I, Dr Hunter Aguilar, personally performed the services described in this documentation. All medical record entries made by the scribe were at my direction and in my presence. I have reviewed the chart and agree that the record reflects my personal performance and is accurate and complete.

## 2024-08-16 NOTE — PROGRESS NOTES
C3 nurse spoke with Jacoby Jean-Baptiste for a TCC post hospital discharge follow up call. The patient has a scheduled HOSFU appointment with Hunter Aguilar III, MD on 08/20/2024 @ 4163.

## 2024-08-17 PROCEDURE — G0180 MD CERTIFICATION HHA PATIENT: HCPCS | Mod: ,,, | Performed by: FAMILY MEDICINE

## 2024-08-19 ENCOUNTER — TELEPHONE (OUTPATIENT)
Dept: FAMILY MEDICINE | Facility: CLINIC | Age: 49
End: 2024-08-19
Payer: COMMERCIAL

## 2024-08-19 NOTE — TELEPHONE ENCOUNTER
----- Message from Meli Grant, Patient Care Assistant sent at 8/19/2024  1:44 PM CDT -----  Regarding: advice  Contact: [facility]  Type: Needs Medical Advice    Who Called:  [facility]    Symptoms (please be specific):  right arm pain  How long has patient had these symptoms:  5 days     Pharmacy name and phone #:    [id number] - [place], [place] - [address] AT [facility] [address]  [address]  [place] [address]  Phone: [phone] Fax: [phone]    Best Call Back Number: [phone] (home)     Additional Information: please call to advise. Thanks!

## 2024-08-21 ENCOUNTER — OFFICE VISIT (OUTPATIENT)
Dept: WOUND CARE | Facility: HOSPITAL | Age: 49
End: 2024-08-21
Attending: FAMILY MEDICINE
Payer: COMMERCIAL

## 2024-08-21 VITALS — RESPIRATION RATE: 18 BRPM | HEART RATE: 72 BPM | TEMPERATURE: 98 F

## 2024-08-21 DIAGNOSIS — S41.102D OPEN WOUND OF LEFT UPPER ARM, SUBSEQUENT ENCOUNTER: Primary | ICD-10-CM

## 2024-08-21 DIAGNOSIS — L02.818 CUTANEOUS ABSCESS OF OTHER SITE: ICD-10-CM

## 2024-08-21 PROBLEM — S41.102A OPEN WOUND OF LEFT UPPER ARM: Status: ACTIVE | Noted: 2024-08-21

## 2024-08-21 PROCEDURE — 1159F MED LIST DOCD IN RCRD: CPT | Mod: CPTII,,, | Performed by: FAMILY MEDICINE

## 2024-08-21 PROCEDURE — 3044F HG A1C LEVEL LT 7.0%: CPT | Mod: CPTII,,, | Performed by: FAMILY MEDICINE

## 2024-08-21 PROCEDURE — 99203 OFFICE O/P NEW LOW 30 MIN: CPT | Mod: PN | Performed by: FAMILY MEDICINE

## 2024-08-21 PROCEDURE — 1160F RVW MEDS BY RX/DR IN RCRD: CPT | Mod: CPTII,,, | Performed by: FAMILY MEDICINE

## 2024-08-21 PROCEDURE — 3072F LOW RISK FOR RETINOPATHY: CPT | Mod: CPTII,,, | Performed by: FAMILY MEDICINE

## 2024-08-21 PROCEDURE — 1111F DSCHRG MED/CURRENT MED MERGE: CPT | Mod: CPTII,,, | Performed by: FAMILY MEDICINE

## 2024-08-21 PROCEDURE — 99213 OFFICE O/P EST LOW 20 MIN: CPT | Mod: ,,, | Performed by: FAMILY MEDICINE

## 2024-08-21 NOTE — PROGRESS NOTES
"          Wound Care Progress Note    Subjective:       Patient ID: Jacoby Jean-Baptiste is a 49 y.o. male.    Chief Complaint: Wound Care    HPI  Pt seen in clinic for a FU visit for a left upper arm open surgical wound from an abscess. Wound is stable from hospital, some improvement, no s/s of infection. No other complaints today  Review of Systems   Skin:  Positive for wound.        Open wound left upper arm   All other systems reviewed and are negative.      Objective:        Physical Exam  Vitals and nursing note reviewed.   Constitutional:       General: He is not in acute distress.     Appearance: Normal appearance.   Skin:     General: Skin is warm and dry.      Findings: Lesion present.      Comments: Left upper arm surgical wound, see wound care assessment documentation in chart review scanned under the media tab   Neurological:      General: No focal deficit present.      Mental Status: He is alert.   Psychiatric:         Mood and Affect: Mood normal.         Judgment: Judgment normal.       Vitals:    08/21/24 1100   Pulse: 72   Resp: 18   Temp: 98.1 °F (36.7 °C)       Assessment:           ICD-10-CM ICD-9-CM   1. Open wound of left upper arm, subsequent encounter  S41.102D V58.89     880.03   2. Cutaneous abscess of other site  L02.818 682.8                Plan:                  Jacoby Waters" was seen today for wound care.    Diagnoses and all orders for this visit:    Open wound of left upper arm, subsequent encounter    Cutaneous abscess of other site  -     Ambulatory referral/consult to Wound Clinic        Please see wound care instructions which have been provided separately.           "

## 2024-08-22 ENCOUNTER — TELEPHONE (OUTPATIENT)
Dept: FAMILY MEDICINE | Facility: CLINIC | Age: 49
End: 2024-08-22
Payer: COMMERCIAL

## 2024-08-22 NOTE — TELEPHONE ENCOUNTER
----- Message from Naresh Rojas sent at 8/22/2024 11:03 AM CDT -----  Regarding: home health  Contact: [facility]/patient  Type:  Patient Returning Call    Who Called:[facility]  Who Left Message for Patient:office nurse  Does the patient know what this is regarding?:spoke with patient he is stating area of soreness has moved down arm  Would the patient rather a call back or a response via [employer]? Please call patient or home health  Best Call Back Number:[phone]  [facility]# [phone]  Additional Information:

## 2024-08-23 ENCOUNTER — HOSPITAL ENCOUNTER (EMERGENCY)
Facility: HOSPITAL | Age: 49
Discharge: HOME | End: 2024-08-24
Attending: EMERGENCY MEDICINE
Payer: COMMERCIAL

## 2024-08-23 DIAGNOSIS — Z51.89 WOUND CHECK, ABSCESS: ICD-10-CM

## 2024-08-23 LAB
ALBUMIN SERPL BCP-MCNC: 3.6 G/DL (ref 3.5–5.2)
ALP SERPL-CCNC: 38 U/L (ref 55–135)
ALT SERPL W/O P-5'-P-CCNC: 12 U/L (ref 10–44)
ANION GAP SERPL CALC-SCNC: 8 MMOL/L (ref 8–16)
APTT PPP: 23.4 SEC (ref 21–32)
AST SERPL-CCNC: 14 U/L (ref 10–40)
BASOPHILS # BLD AUTO: 0.02 K/UL (ref 0–0.2)
BASOPHILS NFR BLD: 0.1 % (ref 0–1.9)
BILIRUB SERPL-MCNC: 0.5 MG/DL (ref 0.1–1)
BNP SERPL-MCNC: 56 PG/ML (ref 0–99)
BUN SERPL-MCNC: 12 MG/DL (ref 6–20)
CALCIUM SERPL-MCNC: 8.4 MG/DL (ref 8.7–10.5)
CHLORIDE SERPL-SCNC: 106 MMOL/L (ref 95–110)
CO2 SERPL-SCNC: 26 MMOL/L (ref 23–29)
CREAT SERPL-MCNC: 1 MG/DL (ref 0.5–1.4)
DIFFERENTIAL METHOD BLD: ABNORMAL
EOSINOPHIL # BLD AUTO: 0 K/UL (ref 0–0.5)
EOSINOPHIL NFR BLD: 0.1 % (ref 0–8)
ERYTHROCYTE [DISTWIDTH] IN BLOOD BY AUTOMATED COUNT: 16.2 % (ref 11.5–14.5)
EST. GFR  (NO RACE VARIABLE): >60 ML/MIN/1.73 M^2
GLUCOSE SERPL-MCNC: 98 MG/DL (ref 70–110)
HCT VFR BLD AUTO: 35 % (ref 40–54)
HGB BLD-MCNC: 11.3 G/DL (ref 14–18)
IMM GRANULOCYTES # BLD AUTO: 0.1 K/UL (ref 0–0.04)
IMM GRANULOCYTES NFR BLD AUTO: 0.6 % (ref 0–0.5)
INFLUENZA A, MOLECULAR: NEGATIVE
INFLUENZA B, MOLECULAR: NEGATIVE
INR PPP: 1 (ref 0.8–1.2)
LDH SERPL L TO P-CCNC: 1.45 MMOL/L (ref 0.5–2.2)
LYMPHOCYTES # BLD AUTO: 1.2 K/UL (ref 1–4.8)
LYMPHOCYTES NFR BLD: 6.7 % (ref 18–48)
MAGNESIUM SERPL-MCNC: 1.1 MG/DL (ref 1.6–2.6)
MAGNESIUM SERPL-MCNC: 1.8 MG/DL (ref 1.6–2.6)
MCH RBC QN AUTO: 31.2 PG (ref 27–31)
MCHC RBC AUTO-ENTMCNC: 32.3 G/DL (ref 32–36)
MCV RBC AUTO: 97 FL (ref 82–98)
MONOCYTES # BLD AUTO: 0.9 K/UL (ref 0.3–1)
MONOCYTES NFR BLD: 5.1 % (ref 4–15)
NEUTROPHILS # BLD AUTO: 15.8 K/UL (ref 1.8–7.7)
NEUTROPHILS NFR BLD: 87.4 % (ref 38–73)
NRBC BLD-RTO: 0 /100 WBC
PHOSPHATE SERPL-MCNC: 3.3 MG/DL (ref 2.7–4.5)
PLATELET # BLD AUTO: 204 K/UL (ref 150–450)
PMV BLD AUTO: 10.4 FL (ref 9.2–12.9)
POTASSIUM SERPL-SCNC: 4.2 MMOL/L (ref 3.5–5.1)
PROCALCITONIN SERPL IA-MCNC: 0.25 NG/ML (ref 0–0.5)
PROT SERPL-MCNC: 6.3 G/DL (ref 6–8.4)
PROTHROMBIN TIME: 11.4 SEC (ref 9–12.5)
RBC # BLD AUTO: 3.62 M/UL (ref 4.6–6.2)
SAMPLE: NORMAL
SODIUM SERPL-SCNC: 140 MMOL/L (ref 136–145)
SPECIMEN SOURCE: NORMAL
TROPONIN I SERPL HS-MCNC: 5.6 PG/ML (ref 0–14.9)
WBC # BLD AUTO: 18 K/UL (ref 3.9–12.7)

## 2024-08-23 PROCEDURE — 80053 COMPREHEN METABOLIC PANEL: CPT | Performed by: PHYSICIAN ASSISTANT

## 2024-08-23 PROCEDURE — 63600175 PHARM REV CODE 636 W HCPCS: Performed by: EMERGENCY MEDICINE

## 2024-08-23 PROCEDURE — 96368 THER/DIAG CONCURRENT INF: CPT

## 2024-08-23 PROCEDURE — 94640 AIRWAY INHALATION TREATMENT: CPT | Mod: XB

## 2024-08-23 PROCEDURE — 85730 THROMBOPLASTIN TIME PARTIAL: CPT | Performed by: EMERGENCY MEDICINE

## 2024-08-23 PROCEDURE — 85610 PROTHROMBIN TIME: CPT | Performed by: EMERGENCY MEDICINE

## 2024-08-23 PROCEDURE — 85025 COMPLETE CBC W/AUTO DIFF WBC: CPT | Performed by: PHYSICIAN ASSISTANT

## 2024-08-23 PROCEDURE — 87040 BLOOD CULTURE FOR BACTERIA: CPT | Performed by: EMERGENCY MEDICINE

## 2024-08-23 PROCEDURE — 93010 ELECTROCARDIOGRAM REPORT: CPT | Mod: ,,, | Performed by: GENERAL PRACTICE

## 2024-08-23 PROCEDURE — 87502 INFLUENZA DNA AMP PROBE: CPT | Performed by: EMERGENCY MEDICINE

## 2024-08-23 PROCEDURE — 84145 PROCALCITONIN (PCT): CPT | Performed by: EMERGENCY MEDICINE

## 2024-08-23 PROCEDURE — 83735 ASSAY OF MAGNESIUM: CPT | Mod: 91 | Performed by: PHYSICIAN ASSISTANT

## 2024-08-23 PROCEDURE — 36415 COLL VENOUS BLD VENIPUNCTURE: CPT | Performed by: PHYSICIAN ASSISTANT

## 2024-08-23 PROCEDURE — 84100 ASSAY OF PHOSPHORUS: CPT | Performed by: PHYSICIAN ASSISTANT

## 2024-08-23 PROCEDURE — 96366 THER/PROPH/DIAG IV INF ADDON: CPT

## 2024-08-23 PROCEDURE — 93005 ELECTROCARDIOGRAM TRACING: CPT | Performed by: GENERAL PRACTICE

## 2024-08-23 PROCEDURE — 94761 N-INVAS EAR/PLS OXIMETRY MLT: CPT

## 2024-08-23 PROCEDURE — 99285 EMERGENCY DEPT VISIT HI MDM: CPT | Mod: 25

## 2024-08-23 PROCEDURE — 96365 THER/PROPH/DIAG IV INF INIT: CPT

## 2024-08-23 PROCEDURE — 25000242 PHARM REV CODE 250 ALT 637 W/ HCPCS: Performed by: EMERGENCY MEDICINE

## 2024-08-23 PROCEDURE — 83735 ASSAY OF MAGNESIUM: CPT | Performed by: EMERGENCY MEDICINE

## 2024-08-23 PROCEDURE — 84484 ASSAY OF TROPONIN QUANT: CPT | Performed by: PHYSICIAN ASSISTANT

## 2024-08-23 PROCEDURE — 83880 ASSAY OF NATRIURETIC PEPTIDE: CPT | Performed by: PHYSICIAN ASSISTANT

## 2024-08-23 PROCEDURE — 25000003 PHARM REV CODE 250: Performed by: EMERGENCY MEDICINE

## 2024-08-23 RX ORDER — BUDESONIDE 0.5 MG/2ML
0.5 INHALANT ORAL ONCE
Status: COMPLETED | OUTPATIENT
Start: 2024-08-23 | End: 2024-08-23

## 2024-08-23 RX ORDER — HYDROCODONE BITARTRATE AND ACETAMINOPHEN 5; 325 MG/1; MG/1
1 TABLET ORAL
Status: COMPLETED | OUTPATIENT
Start: 2024-08-23 | End: 2024-08-23

## 2024-08-23 RX ORDER — LEVALBUTEROL INHALATION SOLUTION 0.63 MG/3ML
0.63 SOLUTION RESPIRATORY (INHALATION)
Status: COMPLETED | OUTPATIENT
Start: 2024-08-23 | End: 2024-08-23

## 2024-08-23 RX ORDER — IPRATROPIUM BROMIDE 0.5 MG/2.5ML
0.5 SOLUTION RESPIRATORY (INHALATION) ONCE
Status: COMPLETED | OUTPATIENT
Start: 2024-08-23 | End: 2024-08-23

## 2024-08-23 RX ORDER — MAGNESIUM SULFATE HEPTAHYDRATE 40 MG/ML
2 INJECTION, SOLUTION INTRAVENOUS ONCE
Status: COMPLETED | OUTPATIENT
Start: 2024-08-23 | End: 2024-08-24

## 2024-08-23 RX ADMIN — IPRATROPIUM BROMIDE 0.5 MG: 0.5 SOLUTION RESPIRATORY (INHALATION) at 08:08

## 2024-08-23 RX ADMIN — BUDESONIDE INHALATION 0.5 MG: 0.5 SUSPENSION RESPIRATORY (INHALATION) at 08:08

## 2024-08-23 RX ADMIN — PIPERACILLIN SODIUM AND TAZOBACTAM SODIUM 4.5 G: 4; .5 INJECTION, POWDER, LYOPHILIZED, FOR SOLUTION INTRAVENOUS at 09:08

## 2024-08-23 RX ADMIN — MAGNESIUM SULFATE HEPTAHYDRATE 2 G: 40 INJECTION, SOLUTION INTRAVENOUS at 09:08

## 2024-08-23 RX ADMIN — VANCOMYCIN HYDROCHLORIDE 2000 MG: 500 INJECTION, POWDER, LYOPHILIZED, FOR SOLUTION INTRAVENOUS at 10:08

## 2024-08-23 RX ADMIN — LEVALBUTEROL HYDROCHLORIDE 0.63 MG: 0.63 SOLUTION RESPIRATORY (INHALATION) at 08:08

## 2024-08-23 RX ADMIN — HYDROCODONE BITARTRATE AND ACETAMINOPHEN 1 TABLET: 5; 325 TABLET ORAL at 09:08

## 2024-08-23 NOTE — TELEPHONE ENCOUNTER
Spoke with smith alvarado nurse [phone] [facility] re his rt arm has area where iv site possibly was and pain nurse said like a jelly feeling area pt worried about blood clot and nurse states it is going down arm. I told her send to er asap so they can go ahead eval do us test etc to make sure no clot. She stated she will .

## 2024-08-23 NOTE — ED NOTES
Bed: [place] [address]  Expected date:   Expected time:   Means of arrival:   Comments:  Cc2

## 2024-08-23 NOTE — FIRST PROVIDER EVALUATION
Emergency Department TeleTriage Encounter Note      CHIEF COMPLAINT    Chief Complaint   Patient presents with    Wound Check     Pt has wound on L upper arm that home health told him to get checked in the er       VITAL SIGNS   Initial Vitals [08/23/24 1609]   BP Pulse Resp Temp SpO2   (!) 153/95 92 18 98.9 °F (37.2 °C) 96 %      MAP       --            ALLERGIES    Review of patient's allergies indicates:  No Known Allergies    PROVIDER TRIAGE NOTE  This is a teletriage evaluation of a 49 y.o. male presenting to the ED complaining of wound check. Patient states wound to left upper arm is getting worst. Was seen here last week for same thing and completed antibiotics with no improvement.    Patient is alert and oriented. He speaks in complete sentences. He is sitting upright in the chair in no distress.     Initial orders will be placed and care will be transferred to an alternate provider when patient is roomed for a full evaluation. Any additional orders and the final disposition will be determined by that provider.         ORDERS  Labs Reviewed   CBC W/ AUTO DIFFERENTIAL   COMPREHENSIVE METABOLIC PANEL       ED Orders (720h ago, onward)      Start Ordered     Status Ordering Provider    08/23/24 1635 08/23/24 1634  CBC auto differential  STAT         Ordered CLAUDIA ALMARAZ    08/23/24 1635 08/23/24 1634  Comprehensive metabolic panel  STAT         Ordered CLAUDIA ALMARAZ    08/23/24 1635 08/23/24 1634  Insert Saline lock IV  Once         Ordered CLAUDIA ALMARAZ              Virtual Visit Note: The provider triage portion of this emergency department evaluation and documentation was performed via [employer], a HIPAA-compliant telemedicine application, in concert with a tele-presenter in the room. A face to face patient evaluation with one of my colleagues will occur once the patient is placed in an emergency department room.      DISCLAIMER: This note was prepared with [employer] voice recognition transcription  software. Garbled syntax, mangled pronouns, and other bizarre constructions may be attributed to that software system.

## 2024-08-24 VITALS
HEART RATE: 72 BPM | WEIGHT: 271 LBS | DIASTOLIC BLOOD PRESSURE: 79 MMHG | BODY MASS INDEX: 45.15 KG/M2 | RESPIRATION RATE: 18 BRPM | SYSTOLIC BLOOD PRESSURE: 163 MMHG | OXYGEN SATURATION: 96 % | HEIGHT: 65 IN | TEMPERATURE: 98 F

## 2024-08-24 PROCEDURE — 25000003 PHARM REV CODE 250: Performed by: EMERGENCY MEDICINE

## 2024-08-24 RX ORDER — DOXYCYCLINE 100 MG/1
100 CAPSULE ORAL 2 TIMES DAILY
Qty: 20 CAPSULE | Refills: 0 | Status: SHIPPED | OUTPATIENT
Start: 2024-08-24 | End: 2024-08-24

## 2024-08-24 RX ORDER — DOXYCYCLINE 100 MG/1
100 CAPSULE ORAL 2 TIMES DAILY
Qty: 20 CAPSULE | Refills: 0 | Status: SHIPPED | OUTPATIENT
Start: 2024-08-24 | End: 2024-09-03

## 2024-08-24 RX ADMIN — BACITRACIN ZINC, NEOMYCIN, POLYMYXIN B: 400; 3.5; 5 OINTMENT TOPICAL at 01:08

## 2024-08-24 NOTE — ED PROVIDER NOTES
Encounter Date: 8/23/2024       History     Chief Complaint   Patient presents with    Wound Check     Pt has wound on L upper arm that home health told him to get checked in the er     49-year-old male presents with complaints of increased redness to a wound to his left upper arm.  The patient was recently admitted to the hospital for a UC flare as well as treatment of a left upper extremity skin infection/abscess.  He has been undergoing wound care weekly at the wound care clinic in his also undergoing regular wound care with his home health nurse.  The patient states that he was told to come to the emergency room today as his home health nurse thought the wound appeared to be worsening.  He denies abdominal pain nausea vomiting or fever.  He does report pain and discomfort to that area.  The area was previously pack by the nurse prior to ED arrival.        Review of patient's allergies indicates:  No Known Allergies  Past Medical History:   Diagnosis Date    C. difficile colitis     Cavitary pneumonia 11/2023    pos aspergillus serology    Diabetes mellitus 01/2022    Hyperlipidemia 2015    Hypertension 2015    Insomnia 2015    Ulcerative colitis      Past Surgical History:   Procedure Laterality Date    BRONCHOSCOPY WITH FLUOROSCOPY Left 11/20/2023    Procedure: BRONCHOSCOPY, WITH FLUOROSCOPY;  Surgeon: Lisa Villarreal MD;  Location: [facility];  Service: Pulmonary;  Laterality: Left;    BRONCHOSCOPY WITH FLUOROSCOPY N/A 11/30/2023    Procedure: BRONCHOSCOPY, WITH FLUOROSCOPY;  Surgeon: Lisa Villarreal MD;  Location: [facility];  Service: Pulmonary;  Laterality: N/A;    CARDIAC ELECTROPHYSIOLOGY MAPPING AND ABLATION  2017    SVT    CHOLECYSTECTOMY  2011    COLONOSCOPY N/A 5/3/2022    Procedure: COLONOSCOPY;  Surgeon: Shan Jean III, MD;  Location: [facility];  Service: Endoscopy;  Laterality: N/A;    COLONOSCOPY N/A 3/16/2024    Procedure: COLONOSCOPY;  Surgeon: ALEKSANDER Ramos MD;  Location:  [facility] ENDO;  Service: Endoscopy;  Laterality: N/A;    COLONOSCOPY WITH FECAL MICROBIOTA TRANSFER N/A 3/21/2023    Procedure: COLONOSCOPY, WITH FECAL MICROBIOTA TRANSFER;  Surgeon: David Millan MD;  Location: [facility];  Service: Endoscopy;  Laterality: N/A;    ESOPHAGOGASTRODUODENOSCOPY N/A 4/24/2023    Procedure: EGD (ESOPHAGOGASTRODUODENOSCOPY);  Surgeon: Shan Jean III, MD;  Location: [facility];  Service: Endoscopy;  Laterality: N/A;    ESOPHAGOGASTRODUODENOSCOPY N/A 6/5/2024    Procedure: EGD (ESOPHAGOGASTRODUODENOSCOPY);  Surgeon: Odalis Mccabe MD;  [facility]: [facility];  Service: Endoscopy;  Laterality: N/A;    FLEXIBLE SIGMOIDOSCOPY N/A 6/5/2024    Procedure: SIGMOIDOSCOPY, FLEXIBLE;  Surgeon: Odalis Mccabe MD;  [facility]: [facility];  Service: Endoscopy;  Laterality: N/A;    FLEXIBLE SIGMOIDOSCOPY N/A 7/16/2024    Procedure: SIGMOIDOSCOPY, FLEXIBLE;  Surgeon: Shan Jean III, MD;  [facility]: [facility];  Service: Endoscopy;  Laterality: N/A;    FLEXIBLE SIGMOIDOSCOPY N/A 8/13/2024    Procedure: SIGMOIDOSCOPY, FLEXIBLE;  Surgeon: Shan Jean III, MD;  [facility]: [facility];  Service: Endoscopy;  Laterality: N/A;    GANGLION CYST EXCISION Left 1992    UMBILICAL HERNIA REPAIR  2011    with mesh     Family History   Problem Relation Name Age of Onset    Asthma Mother       Social History     Tobacco Use    Smoking status: Former     Current packs/day: 1.00     Average packs/day: 1 pack/day for 31.7 years (31.7 ttl pk-yrs)     Types: Cigarettes     Start date: 1993    Smokeless tobacco: Never    Tobacco comments:     Pt states he has stopped smoking with Chantix three weeks ago. 12/1/23   Substance Use Topics    Alcohol use: Yes     Comment: ocass    Drug use: Not Currently     Review of Systems   Constitutional: Negative.  Negative for fever.   HENT: Negative.     Respiratory: Negative.     Cardiovascular: Negative.    Gastrointestinal: Negative.    Genitourinary: Negative.     Musculoskeletal: Negative.    Skin:  Positive for wound.   Neurological: Negative.    All other systems reviewed and are negative.      Physical Exam     Initial Vitals [08/23/24 1609]   BP Pulse Resp Temp SpO2   (!) 153/95 92 18 98.9 °F (37.2 °C) 96 %      MAP       --         Physical Exam    Nursing note and vitals reviewed.  Constitutional: He is cooperative. He does not have a sickly appearance. He does not appear ill. No distress.   HENT:   Head: Normocephalic and atraumatic.   Nose: Nose normal. No mucosal edema or rhinorrhea.   Mouth/Throat: Uvula is midline, oropharynx is clear and moist and mucous membranes are normal. No oral lesions. No trismus in the jaw. No uvula swelling. No oropharyngeal exudate, posterior oropharyngeal edema, posterior oropharyngeal erythema or tonsillar abscesses.   Eyes: Conjunctivae, EOM and lids are normal. Pupils are equal, round, and reactive to light. Right eye exhibits no discharge. Left eye exhibits no discharge. No scleral icterus.   Neck: Trachea normal. Neck supple. No stridor present. No tracheal deviation present. No JVD present.   Normal range of motion.   Full passive range of motion without pain.     Cardiovascular:  Normal rate, regular rhythm, normal heart sounds, intact distal pulses and normal pulses.     Exam reveals no gallop, no distant heart sounds, no friction rub and no decreased pulses.       No murmur heard.  Pulmonary/Chest: Effort normal and breath sounds normal. No stridor. No respiratory distress. He has no decreased breath sounds. He has no wheezes. He has no rhonchi. He has no rales.   Abdominal: Abdomen is soft. Bowel sounds are normal. He exhibits no distension and no mass. There is no abdominal tenderness. No hernia. There is no rebound and no guarding.   Musculoskeletal:         General: No tenderness or edema. Normal range of motion.      Right hand: Normal. Normal capillary refill. Normal pulse.      Left hand: Normal. Normal capillary  refill. Normal pulse.      Cervical back: Normal, full passive range of motion without pain, normal range of motion and neck supple. No tenderness or bony tenderness. No spinous process tenderness or muscular tenderness. Normal range of motion and normal range of motion. Normal.      Thoracic back: Normal. No tenderness or bony tenderness. Normal range of motion.      Lumbar back: Normal. No tenderness or bony tenderness. Normal range of motion.      Right lower leg: No tenderness. No edema.      Left lower leg: No tenderness. No edema.      Right foot: Normal. Normal capillary refill. No swelling. Normal pulse.      Left foot: Normal. Normal capillary refill. No swelling. Normal pulse.     Lymphadenopathy:     He has no cervical adenopathy.   Neurological: He is alert and oriented to person, place, and time. He has normal strength. No cranial nerve deficit or sensory deficit. Coordination normal.   Skin: Skin is warm and dry. Capillary refill takes less than 2 seconds. No ecchymosis, no petechiae and no rash noted. There is erythema. No cyanosis. No pallor.   Left medial arm with wound present, mild surrounding induration, mild erythema, mild increased warmth, no pain out of proportion to exam.  Packing is present.  No purulence on expression.   Psychiatric: He has a normal mood and affect. His speech is normal and behavior is normal.         ED Course   Procedures  Labs Reviewed   CBC W/ AUTO DIFFERENTIAL - Abnormal       Result Value    WBC 18.00 (*)     RBC 3.62 (*)     Hemoglobin 11.3 (*)     Hematocrit 35.0 (*)     MCV 97      MCH 31.2 (*)     MCHC 32.3      RDW 16.2 (*)     Platelets 204      MPV 10.4      Immature Granulocytes 0.6 (*)     Gran # (ANC) 15.8 (*)     Immature Grans (Abs) 0.10 (*)     Lymph # 1.2      Mono # 0.9      Eos # 0.0      Baso # 0.02      nRBC 0      Gran % 87.4 (*)     Lymph % 6.7 (*)     Mono % 5.1      Eosinophil % 0.1      Basophil % 0.1      Differential Method Automated      COMPREHENSIVE METABOLIC PANEL - Abnormal    Sodium 140      Potassium 4.2      Chloride 106      CO2 26      Glucose 98      BUN 12      Creatinine 1.0      Calcium 8.4 (*)     Total Protein 6.3      Albumin 3.6      Total Bilirubin 0.5      Alkaline Phosphatase 38 (*)     AST 14      ALT 12      eGFR >60.0      Anion Gap 8     MAGNESIUM - Abnormal    Magnesium 1.1 (*)    CULTURE, BLOOD    Blood Culture, Routine No growth after 5 days.      Narrative:     Aerobic and anaerobic   CULTURE, BLOOD    Blood Culture, Routine No growth after 5 days.      Narrative:     Aerobic and anaerobic   INFLUENZA A AND B ANTIGEN    Influenza A, Molecular Negative      Influenza B, Molecular Negative      Flu A & B Source NP      Narrative:     Specimen Source->Nasopharyngeal Swab   MAGNESIUM   PHOSPHORUS   APTT    aPTT 23.4     PROTIME-INR    Prothrombin Time 11.4      INR 1.0     B-TYPE NATRIURETIC PEPTIDE   TROPONIN I HIGH SENSITIVITY   PROCALCITONIN    Procalcitonin 0.249     B-TYPE NATRIURETIC PEPTIDE    BNP 56     PHOSPHORUS    Phosphorus 3.3     TROPONIN I HIGH SENSITIVITY    Troponin I High Sensitivity 5.6     MAGNESIUM    Magnesium 1.8     ISTAT LACTATE    POC Lactate 1.45      Sample VENOUS          ECG Results              EKG 12-lead (Final result)        Collection Time Result Time QRS Duration OHS QTC Calculation    08/23/24 20:34:36 08/29/24 19:49:24 76 437                     Final result by Interface, Lab In [facility] (08/29/24 19:49:33)                   Narrative:    Test Reason : Z51.89,    Vent. Rate : 074 BPM     Atrial Rate : 074 BPM     P-R Int : 144 ms          QRS Dur : 076 ms      QT Int : 394 ms       P-R-T Axes : 041 017 028 degrees     QTc Int : 437 ms    Normal sinus rhythm  Normal ECG  When compared with ECG of 09-AUG-2024 12:52,  No significant change was found  Confirmed by Mynor RENTERIA, Harris ONEILL ([id number]) on 8/29/2024 7:49:22 PM    Referred By: [email or web address]   SELF           Confirmed By:Harris Lowe MD                                      EKG 12-lead (Final result)  Result time 09/04/24 13:50:54      Final result by Unknown User (09/04/24 13:50:54)                                      Imaging Results              X-Ray Chest AP Portable (Final result)  Result time 08/23/24 21:35:22      Final result by Kirk Dooley MD (08/23/24 21:35:22)                   Impression:      No convincing radiographic evidence of acute intrathoracic process on this single view.      Electronically signed by: Kirk Dooley MD  Date:    08/23/2024  Time:    21:35               Narrative:    EXAMINATION:  XR CHEST AP PORTABLE    CLINICAL HISTORY:  cough;    TECHNIQUE:  Single frontal view of the chest was performed.    COMPARISON:  08/09/2024    FINDINGS:  Cardiac monitoring leads overlie the chest.  The cardiac silhouette is stable in size and configuration.  Lungs are symmetrically expanded without evidence of confluent airspace consolidation.  No significant volume of pleural fluid or pneumothorax identified.  Osseous structures demonstrate mild degenerative change.                                       Medications   levalbuterol nebulizer solution 0.63 mg (0.63 mg Nebulization Given 8/23/24 2014)   ipratropium 0.02 % nebulizer solution 0.5 mg (0.5 mg Nebulization Given 8/23/24 2014)   budesonide nebulizer solution 0.5 mg (0.5 mg Nebulization Given 8/23/24 2014)   HYDROcodone-acetaminophen 5-325 mg per tablet 1 tablet (1 tablet Oral Given 8/23/24 2140)   piperacillin-tazobactam 4.5 g in dextrose 5 % 100 mL IVPB (ready to mix) (0 g Intravenous Stopped 8/23/24 2223)   magnesium sulfate 2g in water 50mL IVPB (premix) (0 g Intravenous Stopped 8/24/24 0012)   vancomycin (VANCOCIN) 2,000 mg in D5W 500 mL IVPB (0 mg Intravenous Stopped 8/24/24 0048)   neomycin-bacitracin-polymyxin ointment ( Topical (Top) Given 8/24/24 0159)     Medical Decision Making  The patient is currently hemodynamically stable well-appearing in no distress.   He has been afebrile.  Wound only has very minimal surrounding erythema, no increased warmth, no tenderness no fluctuance.  Patient denies abdominal pain nausea vomiting or loss of appetite.  He states that he was mainly sent to have the area looked at again as per recommendation of the home health nurse.  He is not currently on antibiotics.  Lactic acid is not elevated.  White blood cell count is elevated however the patient is on steroid therapy.  He has no current evidence to suggest sepsis or severe systemic infection.  Magnesium level was noted to be low.  IV replacement was given.  Level was reassessed and has improved.  Patient states he has a habitually low magnesium level.  Discuss the possibility of admission with the patient.  He prefers discharge home if possible with oral antibiotics.  He states he does not feel ill.  He feels very comfortable with attempting outpatient treatment and prefers this treatment plan as opposed to admission.  As the wound itself does not appear grossly cellulitic with no tenderness or pain on palpation, will attempt home treatment with doxycycline and local wound care.  I have however explained the need to return immediately if he notices any increase in redness around the area, increase in pain, fever nausea or any constitutional symptoms.  Patient voices understanding.  Have explained the importance of reassessment in the next 2 days and again return precautions have been discussed in detail.  Patient voices understanding feels well and feels ready to go home.    Amount and/or Complexity of Data Reviewed  Labs: ordered.  Radiology: ordered.    Risk  OTC drugs.  Prescription drug management.                                      Clinical Impression:  Final diagnoses:  [Z51.89] Wound check, abscess          ED Disposition Condition    Discharge Stable          ED Prescriptions       Medication Sig Dispense Start Date End Date Auth. Provider    doxycycline (VIBRAMYCIN) 100 MG  Cap  (Status: Discontinued) Take 1 capsule (100 mg total) by mouth 2 (two) times daily. for 10 days 20 capsule 2024 Maribel Dobson MD    doxycycline (VIBRAMYCIN) 100 MG Cap () Take 1 capsule (100 mg total) by mouth 2 (two) times daily. for 10 days 20 capsule 2024 9/3/2024 Maribel Dobson MD          Follow-up Information       Follow up With Specialties Details Why Contact Info    Ophelia Kramer MD Infectious Diseases Schedule an appointment as soon as possible for a visit in 3 days  [address]  [address]  [place] [address]  [phone]      Hunter Aguilar III, MD Family Medicine   [address]  [address]  [place] [address]  [phone]               Maribel Dobson MD  [phone]

## 2024-08-24 NOTE — DISCHARGE INSTRUCTIONS
Please read and follow discharge instructions and return precautions.  Rest, avoid any strenuous activity, over exertion or overheating.  Keep well hydrated.  Alternate water and Pedialyte for hydration.  Doxycycline as directed.  Continue local wound care.  Cleaned the area with soap and water daily.  Shower daily  Apply topical antibiotic ointment.  Important to see your primary care provider in the next 3 days as well as Infectious Disease.  Return immediately if symptoms worsen or if you develop fever.  Very important have the wound evaluated and 3 days and important to return immediately if you develop fever or if there is any evidence of worsening  Return immediately if you develop new or worsening symptoms or if you have new problems or concerns.   never

## 2024-08-25 ENCOUNTER — PATIENT MESSAGE (OUTPATIENT)
Dept: FAMILY MEDICINE | Facility: CLINIC | Age: 49
End: 2024-08-25
Payer: COMMERCIAL

## 2024-08-25 ENCOUNTER — HOSPITAL ENCOUNTER (EMERGENCY)
Facility: HOSPITAL | Age: 49
Discharge: HOME | End: 2024-08-25
Attending: STUDENT IN AN ORGANIZED HEALTH CARE EDUCATION/TRAINING PROGRAM
Payer: COMMERCIAL

## 2024-08-25 VITALS
HEIGHT: 65 IN | OXYGEN SATURATION: 98 % | WEIGHT: 271 LBS | HEART RATE: 87 BPM | DIASTOLIC BLOOD PRESSURE: 69 MMHG | TEMPERATURE: 98 F | SYSTOLIC BLOOD PRESSURE: 130 MMHG | RESPIRATION RATE: 16 BRPM | BODY MASS INDEX: 45.15 KG/M2

## 2024-08-25 DIAGNOSIS — I82.621 ACUTE DEEP VEIN THROMBOSIS (DVT) OF BRACHIAL VEIN OF RIGHT UPPER EXTREMITY: Primary | ICD-10-CM

## 2024-08-25 DIAGNOSIS — R07.9 CHEST PAIN: ICD-10-CM

## 2024-08-25 LAB
ALBUMIN SERPL BCP-MCNC: 3.4 G/DL (ref 3.5–5.2)
ALP SERPL-CCNC: 35 U/L (ref 55–135)
ALT SERPL W/O P-5'-P-CCNC: 13 U/L (ref 10–44)
ANION GAP SERPL CALC-SCNC: 7 MMOL/L (ref 8–16)
AST SERPL-CCNC: 12 U/L (ref 10–40)
BASOPHILS # BLD AUTO: 0.06 K/UL (ref 0–0.2)
BASOPHILS NFR BLD: 0.3 % (ref 0–1.9)
BILIRUB SERPL-MCNC: 0.4 MG/DL (ref 0.1–1)
BNP SERPL-MCNC: 75 PG/ML (ref 0–99)
BUN SERPL-MCNC: 10 MG/DL (ref 6–20)
CALCIUM SERPL-MCNC: 8.6 MG/DL (ref 8.7–10.5)
CHLORIDE SERPL-SCNC: 105 MMOL/L (ref 95–110)
CO2 SERPL-SCNC: 28 MMOL/L (ref 23–29)
CREAT SERPL-MCNC: 1 MG/DL (ref 0.5–1.4)
D DIMER PPP IA.FEU-MCNC: 1.04 MG/L FEU (ref 0–0.49)
DIFFERENTIAL METHOD BLD: ABNORMAL
EOSINOPHIL # BLD AUTO: 0.1 K/UL (ref 0–0.5)
EOSINOPHIL NFR BLD: 0.7 % (ref 0–8)
ERYTHROCYTE [DISTWIDTH] IN BLOOD BY AUTOMATED COUNT: 16.5 % (ref 11.5–14.5)
EST. GFR  (NO RACE VARIABLE): >60 ML/MIN/1.73 M^2
GLUCOSE SERPL-MCNC: 104 MG/DL (ref 70–110)
HCT VFR BLD AUTO: 35.5 % (ref 40–54)
HGB BLD-MCNC: 11.2 G/DL (ref 14–18)
IMM GRANULOCYTES # BLD AUTO: 0.11 K/UL (ref 0–0.04)
IMM GRANULOCYTES NFR BLD AUTO: 0.6 % (ref 0–0.5)
INR PPP: 1 (ref 0.8–1.2)
LYMPHOCYTES # BLD AUTO: 1.3 K/UL (ref 1–4.8)
LYMPHOCYTES NFR BLD: 7.4 % (ref 18–48)
MAGNESIUM SERPL-MCNC: 1.5 MG/DL (ref 1.6–2.6)
MCH RBC QN AUTO: 31.1 PG (ref 27–31)
MCHC RBC AUTO-ENTMCNC: 31.5 G/DL (ref 32–36)
MCV RBC AUTO: 99 FL (ref 82–98)
MONOCYTES # BLD AUTO: 1 K/UL (ref 0.3–1)
MONOCYTES NFR BLD: 5.6 % (ref 4–15)
NEUTROPHILS # BLD AUTO: 15 K/UL (ref 1.8–7.7)
NEUTROPHILS NFR BLD: 85.4 % (ref 38–73)
NRBC BLD-RTO: 0 /100 WBC
PLATELET # BLD AUTO: 207 K/UL (ref 150–450)
PMV BLD AUTO: 10.5 FL (ref 9.2–12.9)
POTASSIUM SERPL-SCNC: 3.5 MMOL/L (ref 3.5–5.1)
PROT SERPL-MCNC: 5.9 G/DL (ref 6–8.4)
PROTHROMBIN TIME: 11 SEC (ref 9–12.5)
RBC # BLD AUTO: 3.6 M/UL (ref 4.6–6.2)
SODIUM SERPL-SCNC: 140 MMOL/L (ref 136–145)
TROPONIN I SERPL HS-MCNC: 4.9 PG/ML (ref 0–14.9)
WBC # BLD AUTO: 17.61 K/UL (ref 3.9–12.7)

## 2024-08-25 PROCEDURE — 83735 ASSAY OF MAGNESIUM: CPT | Performed by: STUDENT IN AN ORGANIZED HEALTH CARE EDUCATION/TRAINING PROGRAM

## 2024-08-25 PROCEDURE — 96374 THER/PROPH/DIAG INJ IV PUSH: CPT

## 2024-08-25 PROCEDURE — 96361 HYDRATE IV INFUSION ADD-ON: CPT

## 2024-08-25 PROCEDURE — 83880 ASSAY OF NATRIURETIC PEPTIDE: CPT | Performed by: STUDENT IN AN ORGANIZED HEALTH CARE EDUCATION/TRAINING PROGRAM

## 2024-08-25 PROCEDURE — 25000003 PHARM REV CODE 250: Performed by: STUDENT IN AN ORGANIZED HEALTH CARE EDUCATION/TRAINING PROGRAM

## 2024-08-25 PROCEDURE — 80053 COMPREHEN METABOLIC PANEL: CPT | Performed by: STUDENT IN AN ORGANIZED HEALTH CARE EDUCATION/TRAINING PROGRAM

## 2024-08-25 PROCEDURE — 84484 ASSAY OF TROPONIN QUANT: CPT | Performed by: STUDENT IN AN ORGANIZED HEALTH CARE EDUCATION/TRAINING PROGRAM

## 2024-08-25 PROCEDURE — 99285 EMERGENCY DEPT VISIT HI MDM: CPT | Mod: 25

## 2024-08-25 PROCEDURE — 85025 COMPLETE CBC W/AUTO DIFF WBC: CPT | Performed by: STUDENT IN AN ORGANIZED HEALTH CARE EDUCATION/TRAINING PROGRAM

## 2024-08-25 PROCEDURE — 63600175 PHARM REV CODE 636 W HCPCS: Performed by: STUDENT IN AN ORGANIZED HEALTH CARE EDUCATION/TRAINING PROGRAM

## 2024-08-25 PROCEDURE — 36415 COLL VENOUS BLD VENIPUNCTURE: CPT | Performed by: STUDENT IN AN ORGANIZED HEALTH CARE EDUCATION/TRAINING PROGRAM

## 2024-08-25 PROCEDURE — 85610 PROTHROMBIN TIME: CPT | Performed by: STUDENT IN AN ORGANIZED HEALTH CARE EDUCATION/TRAINING PROGRAM

## 2024-08-25 PROCEDURE — 85379 FIBRIN DEGRADATION QUANT: CPT | Performed by: STUDENT IN AN ORGANIZED HEALTH CARE EDUCATION/TRAINING PROGRAM

## 2024-08-25 PROCEDURE — 25500020 PHARM REV CODE 255: Performed by: STUDENT IN AN ORGANIZED HEALTH CARE EDUCATION/TRAINING PROGRAM

## 2024-08-25 RX ORDER — HYDROCODONE BITARTRATE AND ACETAMINOPHEN 5; 325 MG/1; MG/1
1 TABLET ORAL
Status: COMPLETED | OUTPATIENT
Start: 2024-08-25 | End: 2024-08-25

## 2024-08-25 RX ORDER — HYDROCODONE BITARTRATE AND ACETAMINOPHEN 5; 325 MG/1; MG/1
1 TABLET ORAL EVERY 6 HOURS PRN
Qty: 12 TABLET | Refills: 0 | Status: SHIPPED | OUTPATIENT
Start: 2024-08-25 | End: 2024-08-28

## 2024-08-25 RX ORDER — MAGNESIUM SULFATE HEPTAHYDRATE 40 MG/ML
2 INJECTION, SOLUTION INTRAVENOUS ONCE
Status: COMPLETED | OUTPATIENT
Start: 2024-08-25 | End: 2024-08-25

## 2024-08-25 RX ADMIN — HYDROCODONE BITARTRATE AND ACETAMINOPHEN 1 TABLET: 5; 325 TABLET ORAL at 12:08

## 2024-08-25 RX ADMIN — IOHEXOL 100 ML: 350 INJECTION, SOLUTION INTRAVENOUS at 01:08

## 2024-08-25 RX ADMIN — APIXABAN 5 MG: 5 TABLET, FILM COATED ORAL at 02:08

## 2024-08-25 RX ADMIN — MAGNESIUM SULFATE HEPTAHYDRATE 2 G: 40 INJECTION, SOLUTION INTRAVENOUS at 01:08

## 2024-08-25 RX ADMIN — SODIUM CHLORIDE, POTASSIUM CHLORIDE, SODIUM LACTATE AND CALCIUM CHLORIDE 1000 ML: 600; 310; 30; 20 INJECTION, SOLUTION INTRAVENOUS at 12:08

## 2024-08-25 NOTE — ED NOTES
Patient will need IV ultrasound placement, care team updated.     Discussed NPO status until MD evaluation, glycerin swabs provided, patient states understanding.

## 2024-08-25 NOTE — ED PROVIDER NOTES
Encounter Date: 8/25/2024       History     Chief Complaint   Patient presents with    Palpitations    Shortness of Breath     Wound to left upper arm still painful also pain to right forearm     HPI    49-year-old male with history of hypertension, hyperlipidemia, ulcerative colitis presenting with 1 week of progressive shortness of breath that he states is worse on exertion.  Denies any orthopnea or lower extremity swelling.  Reports a dry cough without any pleuritic pain, fever, or hemoptysis.  Patient was seen here 2 days ago for wound check of his left upper extremity wound.  Workup at that time was relatively unremarkable and patient was discharged with doxycycline.  Patient presenting here to get worked up with a shortness of breath.  Denies any associated chest pain.  Patient also reports some cord like mass in his right upper extremity at the site of his previous midline.      Review of patient's allergies indicates:  No Known Allergies  Past Medical History:   Diagnosis Date    C. difficile colitis     Cavitary pneumonia 11/2023    pos aspergillus serology    Diabetes mellitus 01/2022    Hyperlipidemia 2015    Hypertension 2015    Insomnia 2015    Ulcerative colitis      Past Surgical History:   Procedure Laterality Date    BRONCHOSCOPY WITH FLUOROSCOPY Left 11/20/2023    Procedure: BRONCHOSCOPY, WITH FLUOROSCOPY;  Surgeon: Lisa Villarreal MD;  Location: [facility] ENDO;  Service: Pulmonary;  Laterality: Left;    BRONCHOSCOPY WITH FLUOROSCOPY N/A 11/30/2023    Procedure: BRONCHOSCOPY, WITH FLUOROSCOPY;  Surgeon: Lisa Villarreal MD;  Location: [facility] ENDO;  Service: Pulmonary;  Laterality: N/A;    CARDIAC ELECTROPHYSIOLOGY MAPPING AND ABLATION  2017    SVT    CHOLECYSTECTOMY  2011    COLONOSCOPY N/A 5/3/2022    Procedure: COLONOSCOPY;  Surgeon: Shan Jean III, MD;  Location: [facility];  Service: Endoscopy;  Laterality: N/A;    COLONOSCOPY N/A 3/16/2024    Procedure: COLONOSCOPY;  Surgeon: ALEKSANDER Ramos  MD Jj;  Location: [facility];  Service: Endoscopy;  Laterality: N/A;    COLONOSCOPY WITH FECAL MICROBIOTA TRANSFER N/A 3/21/2023    Procedure: COLONOSCOPY, WITH FECAL MICROBIOTA TRANSFER;  Surgeon: David Millan MD;  Location: [facility];  Service: Endoscopy;  Laterality: N/A;    ESOPHAGOGASTRODUODENOSCOPY N/A 4/24/2023    Procedure: EGD (ESOPHAGOGASTRODUODENOSCOPY);  Surgeon: Shan Jean III, MD;  Location: [facility];  Service: Endoscopy;  Laterality: N/A;    ESOPHAGOGASTRODUODENOSCOPY N/A 6/5/2024    Procedure: EGD (ESOPHAGOGASTRODUODENOSCOPY);  Surgeon: Odalis Mccabe MD;  Location: [facility];  Service: Endoscopy;  Laterality: N/A;    FLEXIBLE SIGMOIDOSCOPY N/A 6/5/2024    Procedure: SIGMOIDOSCOPY, FLEXIBLE;  Surgeon: Odalis Mccabe MD;  Location: [facility];  Service: Endoscopy;  Laterality: N/A;    FLEXIBLE SIGMOIDOSCOPY N/A 7/16/2024    Procedure: SIGMOIDOSCOPY, FLEXIBLE;  Surgeon: Shan Jean III, MD;  Location: [facility];  Service: Endoscopy;  Laterality: N/A;    FLEXIBLE SIGMOIDOSCOPY N/A 8/13/2024    Procedure: SIGMOIDOSCOPY, FLEXIBLE;  Surgeon: Shan Jean III, MD;  Location: [facility];  Service: Endoscopy;  Laterality: N/A;    GANGLION CYST EXCISION Left 1992    UMBILICAL HERNIA REPAIR  2011    with mesh     Family History   Problem Relation Name Age of Onset    Asthma Mother       Social History     Tobacco Use    Smoking status: Former     Current packs/day: 1.00     Average packs/day: 1 pack/day for 31.6 years (31.6 ttl pk-yrs)     Types: Cigarettes     Start date: 1993    Smokeless tobacco: Never    Tobacco comments:     Pt states he has stopped smoking with Chantix three weeks ago. 12/1/23   Substance Use Topics    Alcohol use: Yes     Comment: ocass    Drug use: Not Currently     Review of Systems    As noted above    Physical Exam     Initial Vitals [08/25/24 1115]   BP Pulse Resp Temp SpO2   (!) 141/102 (!) 112 18 97.8 °F (36.6 °C) 99 %      MAP       --          Physical Exam    Constitutional: He appears well-developed and well-nourished. He is not diaphoretic. No distress.   HENT:   Head: Normocephalic and atraumatic.   Eyes: Conjunctivae are normal.   Neck:   Normal range of motion.  Cardiovascular:  Normal rate.           No murmur heard.  Pulmonary/Chest: Breath sounds normal. No respiratory distress. He has no wheezes. He has no rales.   Abdominal: Abdomen is soft. He exhibits no distension. There is no abdominal tenderness.   Musculoskeletal:         General: No edema. Normal range of motion.      Cervical back: Normal range of motion.     Neurological: He is alert and oriented to person, place, and time.   Skin: Skin is warm and dry. Capillary refill takes less than 2 seconds. No rash noted.   Chronic wound to the left axillary space without any surrounding erythema or tenderness.  No significant purulence noted..          ED Course   Procedures  Labs Reviewed   CBC W/ AUTO DIFFERENTIAL   COMPREHENSIVE METABOLIC PANEL   B-TYPE NATRIURETIC PEPTIDE   MAGNESIUM   TROPONIN I HIGH SENSITIVITY   TROPONIN I HIGH SENSITIVITY   PROTIME-INR          Imaging Results    None          Medications - No data to display  Medical Decision Making  49-year-old here for 1 week of shortness of breath and some intermittent palpitations.  Denies any chest pain.  Vital signs here with tachycardia with a rate of 112.  O2 sat is 100% on room air on my evaluation.  Vitals otherwise stable.  Differential includes pulmonary embolism, CHF, pneumonia, superficial thrombophlebitis, infection.    Labs reviewed that shows white blood cell count of 17 although patient is on steroids at this time.  Low suspicion for infectious etiology.  Patient with healing wound to his left axillary area in his currently on doxycycline.  DVT ultrasound does reveal a DVT in the right upper extremity.  CT PE negative for acute pulmonary embolism.  BNP and troponin are negative.  Low suspicion for ACS given  chronicity of chest pain and reassuring EKG and troponin.  Feel patient is stable for discharge at this time.  Given his 1st dose of Eliquis and prescription for 90 days.  Advised close follow up with PCP and strict return precautions.  Short course of pain medication was provided for symptom control of right upper extremity in addition to warm compresses and NSAIDs.  Patient understands and agrees with this plan.    Azael Devlin MD  Emergency Medicine      Amount and/or Complexity of Data Reviewed  Labs: ordered. Decision-making details documented in ED Course.  Radiology: ordered.    Risk  Prescription drug management.               ED Course as of 08/25/24 1443   Sun Aug 25, 2024   1256 WBC(!): 17.61 [KH]   1256 Hemoglobin(!): 11.2 [KH]   1333 BNP: 75 [KH]   1333 Troponin I High Sensitivity: 4.9 [KH]   1435 D-Dimer(!!): 1.04 [KH]      ED Course User Index  [KH] Azael Devlin MD                           Clinical Impression:  Final diagnoses:  [R07.9] Chest pain                 Azael Devlin MD  08/25/24 1445

## 2024-08-26 ENCOUNTER — HOSPITAL ENCOUNTER (EMERGENCY)
Facility: HOSPITAL | Age: 49
Discharge: HOME | End: 2024-08-26
Attending: EMERGENCY MEDICINE
Payer: COMMERCIAL

## 2024-08-26 ENCOUNTER — TELEPHONE (OUTPATIENT)
Dept: FAMILY MEDICINE | Facility: CLINIC | Age: 49
End: 2024-08-26
Payer: COMMERCIAL

## 2024-08-26 VITALS
WEIGHT: 271 LBS | HEIGHT: 65 IN | SYSTOLIC BLOOD PRESSURE: 107 MMHG | TEMPERATURE: 98 F | BODY MASS INDEX: 45.15 KG/M2 | OXYGEN SATURATION: 95 % | RESPIRATION RATE: 16 BRPM | DIASTOLIC BLOOD PRESSURE: 66 MMHG | HEART RATE: 75 BPM

## 2024-08-26 DIAGNOSIS — R06.02 SHORTNESS OF BREATH: ICD-10-CM

## 2024-08-26 DIAGNOSIS — R45.89 ANXIETY ABOUT DYING: Primary | ICD-10-CM

## 2024-08-26 DIAGNOSIS — M79.602 LEFT ARM PAIN: ICD-10-CM

## 2024-08-26 DIAGNOSIS — I82.621 ACUTE DEEP VEIN THROMBOSIS (DVT) OF BRACHIAL VEIN OF RIGHT UPPER EXTREMITY: ICD-10-CM

## 2024-08-26 LAB
ALBUMIN SERPL BCP-MCNC: 3.8 G/DL (ref 3.5–5.2)
ALP SERPL-CCNC: 42 U/L (ref 55–135)
ALT SERPL W/O P-5'-P-CCNC: 17 U/L (ref 10–44)
ANION GAP SERPL CALC-SCNC: 10 MMOL/L (ref 8–16)
AST SERPL-CCNC: 17 U/L (ref 10–40)
BASOPHILS # BLD AUTO: 0.04 K/UL (ref 0–0.2)
BASOPHILS NFR BLD: 0.3 % (ref 0–1.9)
BILIRUB SERPL-MCNC: 0.6 MG/DL (ref 0.1–1)
BNP SERPL-MCNC: 120 PG/ML (ref 0–99)
BUN SERPL-MCNC: 11 MG/DL (ref 6–20)
CALCIUM SERPL-MCNC: 8.8 MG/DL (ref 8.7–10.5)
CHLORIDE SERPL-SCNC: 103 MMOL/L (ref 95–110)
CO2 SERPL-SCNC: 27 MMOL/L (ref 23–29)
CREAT SERPL-MCNC: 1.1 MG/DL (ref 0.5–1.4)
DIFFERENTIAL METHOD BLD: ABNORMAL
EOSINOPHIL # BLD AUTO: 0.2 K/UL (ref 0–0.5)
EOSINOPHIL NFR BLD: 1.2 % (ref 0–8)
ERYTHROCYTE [DISTWIDTH] IN BLOOD BY AUTOMATED COUNT: 16.5 % (ref 11.5–14.5)
EST. GFR  (NO RACE VARIABLE): >60 ML/MIN/1.73 M^2
GLUCOSE SERPL-MCNC: 76 MG/DL (ref 70–110)
HCT VFR BLD AUTO: 34.8 % (ref 40–54)
HGB BLD-MCNC: 10.9 G/DL (ref 14–18)
IMM GRANULOCYTES # BLD AUTO: 0.06 K/UL (ref 0–0.04)
IMM GRANULOCYTES NFR BLD AUTO: 0.5 % (ref 0–0.5)
LYMPHOCYTES # BLD AUTO: 1.9 K/UL (ref 1–4.8)
LYMPHOCYTES NFR BLD: 14.4 % (ref 18–48)
MAGNESIUM SERPL-MCNC: 1.5 MG/DL (ref 1.6–2.6)
MCH RBC QN AUTO: 31.4 PG (ref 27–31)
MCHC RBC AUTO-ENTMCNC: 31.3 G/DL (ref 32–36)
MCV RBC AUTO: 100 FL (ref 82–98)
MONOCYTES # BLD AUTO: 0.9 K/UL (ref 0.3–1)
MONOCYTES NFR BLD: 6.6 % (ref 4–15)
NEUTROPHILS # BLD AUTO: 10.2 K/UL (ref 1.8–7.7)
NEUTROPHILS NFR BLD: 77 % (ref 38–73)
NRBC BLD-RTO: 0 /100 WBC
PLATELET # BLD AUTO: 211 K/UL (ref 150–450)
PMV BLD AUTO: 10.3 FL (ref 9.2–12.9)
POTASSIUM SERPL-SCNC: 3.2 MMOL/L (ref 3.5–5.1)
PROT SERPL-MCNC: 6.5 G/DL (ref 6–8.4)
RBC # BLD AUTO: 3.47 M/UL (ref 4.6–6.2)
SODIUM SERPL-SCNC: 140 MMOL/L (ref 136–145)
TROPONIN I SERPL HS-MCNC: 4.1 PG/ML (ref 0–14.9)
TSH SERPL DL<=0.005 MIU/L-ACNC: 0.94 UIU/ML (ref 0.34–5.6)
WBC # BLD AUTO: 13.25 K/UL (ref 3.9–12.7)

## 2024-08-26 PROCEDURE — 36415 COLL VENOUS BLD VENIPUNCTURE: CPT | Performed by: NURSE PRACTITIONER

## 2024-08-26 PROCEDURE — 63600175 PHARM REV CODE 636 W HCPCS

## 2024-08-26 PROCEDURE — 99285 EMERGENCY DEPT VISIT HI MDM: CPT | Mod: 25

## 2024-08-26 PROCEDURE — 85610 PROTHROMBIN TIME: CPT | Performed by: NURSE PRACTITIONER

## 2024-08-26 PROCEDURE — 84443 ASSAY THYROID STIM HORMONE: CPT | Performed by: NURSE PRACTITIONER

## 2024-08-26 PROCEDURE — 85025 COMPLETE CBC W/AUTO DIFF WBC: CPT | Performed by: NURSE PRACTITIONER

## 2024-08-26 PROCEDURE — 93005 ELECTROCARDIOGRAM TRACING: CPT | Performed by: GENERAL PRACTICE

## 2024-08-26 PROCEDURE — 80053 COMPREHEN METABOLIC PANEL: CPT | Performed by: NURSE PRACTITIONER

## 2024-08-26 PROCEDURE — 83880 ASSAY OF NATRIURETIC PEPTIDE: CPT | Performed by: NURSE PRACTITIONER

## 2024-08-26 PROCEDURE — 93010 ELECTROCARDIOGRAM REPORT: CPT | Mod: ,,, | Performed by: GENERAL PRACTICE

## 2024-08-26 PROCEDURE — 25000003 PHARM REV CODE 250

## 2024-08-26 PROCEDURE — 96365 THER/PROPH/DIAG IV INF INIT: CPT

## 2024-08-26 PROCEDURE — 83735 ASSAY OF MAGNESIUM: CPT | Performed by: NURSE PRACTITIONER

## 2024-08-26 PROCEDURE — 84484 ASSAY OF TROPONIN QUANT: CPT | Performed by: NURSE PRACTITIONER

## 2024-08-26 RX ORDER — ACETAMINOPHEN 500 MG
1000 TABLET ORAL
Status: COMPLETED | OUTPATIENT
Start: 2024-08-26 | End: 2024-08-26

## 2024-08-26 RX ORDER — HYDROCODONE BITARTRATE AND ACETAMINOPHEN 5; 325 MG/1; MG/1
1 TABLET ORAL
Status: COMPLETED | OUTPATIENT
Start: 2024-08-26 | End: 2024-08-26

## 2024-08-26 RX ORDER — HYDROCODONE BITARTRATE AND ACETAMINOPHEN 5; 325 MG/1; MG/1
1 TABLET ORAL EVERY 6 HOURS PRN
Qty: 12 TABLET | Refills: 0 | Status: CANCELLED | OUTPATIENT
Start: 2024-08-26

## 2024-08-26 RX ORDER — MAGNESIUM SULFATE 1 G/100ML
1 INJECTION INTRAVENOUS ONCE
Status: COMPLETED | OUTPATIENT
Start: 2024-08-26 | End: 2024-08-26

## 2024-08-26 RX ADMIN — ACETAMINOPHEN 1000 MG: 500 TABLET, FILM COATED ORAL at 04:08

## 2024-08-26 RX ADMIN — HYDROCODONE BITARTRATE AND ACETAMINOPHEN 1 TABLET: 5; 325 TABLET ORAL at 07:08

## 2024-08-26 RX ADMIN — MAGNESIUM SULFATE HEPTAHYDRATE 1 G: 1 INJECTION, SOLUTION INTRAVENOUS at 08:08

## 2024-08-26 RX ADMIN — POTASSIUM BICARBONATE 40 MEQ: 782 TABLET, EFFERVESCENT ORAL at 08:08

## 2024-08-26 NOTE — FIRST PROVIDER EVALUATION
" Emergency Department TeleTriage Encounter Note      CHIEF COMPLAINT    Chief Complaint   Patient presents with    Shortness of Breath     SOB x 1 wk, was seen  here last night. States "I'm just not feeling right."       VITAL SIGNS   Initial Vitals [08/26/24 1430]   BP Pulse Resp Temp SpO2   (!) 167/80 83 16 98.3 °F (36.8 °C) 100 %      MAP       --            ALLERGIES    Review of patient's allergies indicates:  No Known Allergies    PROVIDER TRIAGE NOTE  This is a teletriage evaluation of a 49 y.o. male presenting to the ED with c/o shortness of breath x 1 week, worsening. Dry cough. Reports continued palpitations and SOB, also headache. Seen in ED yesterday with ED workup completed. Limited physical exam via telehealth: The patient is awake, alert, answering questions appropriately and is not in respiratory distress. Initial orders will be placed and care will be transferred to an alternate provider when patient is roomed for a full evaluation. Any additional orders and the final disposition will be determined by that provider.         ORDERS  Labs Reviewed   MAGNESIUM   CBC W/ AUTO DIFFERENTIAL   COMPREHENSIVE METABOLIC PANEL   TROPONIN I HIGH SENSITIVITY   B-TYPE NATRIURETIC PEPTIDE   TSH       ED Orders (720h ago, onward)      Start Ordered     Status Ordering Provider    08/26/24 1443 08/26/24 1442  Magnesium  STAT         Ordered DAVID DYER    08/26/24 1443 08/26/24 1442  Vital signs  Every 15 min         Ordered DAVID DYER    08/26/24 1443 08/26/24 1442  Cardiac Monitoring - Adult  Continuous        Comments: Notify Physician If:    Ordered DAVID DYER    08/26/24 1443 08/26/24 1442  Pulse Oximetry Continuous  Continuous         Ordered DAVID DYER    08/26/24 1443 08/26/24 1442  Diet NPO  Diet effective now         Ordered DAVID DYER    08/26/24 1443 08/26/24 1442  Saline lock IV  Once         Ordered DAVID DYER    08/26/24 1443 08/26/24 1442  EKG 12-lead  Once      "   Comments: Do not perform if previously done during this visit/ triage    Ordered DAVID DYER.    08/26/24 1443 08/26/24 1442  CBC auto differential  STAT         Ordered DAVID DYER.    08/26/24 1443 08/26/24 1442  Comprehensive metabolic panel  STAT         Ordered MANISHA, DAVID P.    08/26/24 1443 08/26/24 1442  Troponin I High Sensitivity #1  STAT         Ordered DAVID DYER.    08/26/24 1443 08/26/24 1442  B-Type natriuretic peptide (BNP)  STAT         Ordered DAVID DYER P.    08/26/24 1443 08/26/24 1442  TSH  STAT         Ordered DAVID DYER.    08/26/24 1443 08/26/24 1442  X-Ray Chest PA And Lateral  1 time imaging         Ordered DAVID DYER              Virtual Visit Note: The provider triage portion of this emergency department evaluation and documentation was performed via [email or web address], a HIPAA-compliant telemedicine application, in concert with a tele-presenter in the room. A face to face patient evaluation with one of my colleagues will occur once the patient is placed in an emergency department room.      DISCLAIMER: This note was prepared with [employer] voice recognition transcription software. Garbled syntax, mangled pronouns, and other bizarre constructions may be attributed to that software system.

## 2024-08-26 NOTE — TELEPHONE ENCOUNTER
----- Message from Myra Hunter, Patient Care Assistant sent at 8/26/2024 10:25 AM CDT -----  Contact: Pt  Type: Return Call  Who Called: Pt  Who Left Message for Pt: Nurse  Does the patient know what this is regarding: Unsure  Best Call Back Number: [phone] (home)   Thank you~  
done  
heart block 2 type 1

## 2024-08-27 LAB
INR PPP: 1 (ref 0.8–1.2)
PROTHROMBIN TIME: 10.9 SEC (ref 9–12.5)

## 2024-08-27 NOTE — PROVIDER PROGRESS NOTES - EMERGENCY DEPT.
Encounter Date: 8/26/2024    ED Physician Progress Notes        Physician Note:   I discussed the case with the KERWIN, agree with the plan.  I did not see the patient.

## 2024-08-27 NOTE — DISCHARGE INSTRUCTIONS
Please take your medication as prescribed.  Please do not drink or drive while taking this medication.  Please return to the emergency department if you notice shortness of breath, fever, increased pain or swelling in your right arm.

## 2024-08-27 NOTE — ED PROVIDER NOTES
"Encounter Date: 8/26/2024       History     Chief Complaint   Patient presents with    Shortness of Breath     SOB x 1 wk, was seen  here last night. States "I'm just not feeling right."     49-year-old male past medical history of hypertension, hyperlipidemia, diabetes, insomnia, ulcerative colitis presents to the emergency department after he was diagnosed with a pulmonary embolism yesterday by Dr. Thompson.  Patient originally presented yesterday for pain in his right arm where a DVT was found.  A CTA was also performed which was negative for pulmonary embolism.  The patient was discharged with Xarelto.  Patient states that he took a dose prior to discharge and again this morning.  Patient states that he has not had any changes in his symptoms since he was discharged he is just worried about having a blood clot in his arm.  Patient is expressing a lot of confusion as to why he was discharged instead of admitted for this blood clot.  Patient believes that he may go home and die because of the blood clot.  Patient denies allergies to any medications        Review of patient's allergies indicates:  No Known Allergies  Past Medical History:   Diagnosis Date    C. difficile colitis     Cavitary pneumonia 11/2023    pos aspergillus serology    Diabetes mellitus 01/2022    Hyperlipidemia 2015    Hypertension 2015    Insomnia 2015    Ulcerative colitis      Past Surgical History:   Procedure Laterality Date    BRONCHOSCOPY WITH FLUOROSCOPY Left 11/20/2023    Procedure: BRONCHOSCOPY, WITH FLUOROSCOPY;  Surgeon: Lisa Villarreal MD;  Location: [facility] ENDO;  Service: Pulmonary;  Laterality: Left;    BRONCHOSCOPY WITH FLUOROSCOPY N/A 11/30/2023    Procedure: BRONCHOSCOPY, WITH FLUOROSCOPY;  Surgeon: Lisa Villarreal MD;  Location: [facility] ENDO;  Service: Pulmonary;  Laterality: N/A;    CARDIAC ELECTROPHYSIOLOGY MAPPING AND ABLATION  2017    SVT    CHOLECYSTECTOMY  2011    COLONOSCOPY N/A 5/3/2022    Procedure: COLONOSCOPY;  " Surgeon: Shan Jean III, MD;  Location: [facility];  Service: Endoscopy;  Laterality: N/A;    COLONOSCOPY N/A 3/16/2024    Procedure: COLONOSCOPY;  Surgeon: ALEKSANDER Ramos MD;  Location: [facility];  Service: Endoscopy;  Laterality: N/A;    COLONOSCOPY WITH FECAL MICROBIOTA TRANSFER N/A 3/21/2023    Procedure: COLONOSCOPY, WITH FECAL MICROBIOTA TRANSFER;  Surgeon: David Millan MD;  Location: [facility];  Service: Endoscopy;  Laterality: N/A;    ESOPHAGOGASTRODUODENOSCOPY N/A 4/24/2023    Procedure: EGD (ESOPHAGOGASTRODUODENOSCOPY);  Surgeon: Shan Jean III, MD;  Location: [facility];  Service: Endoscopy;  Laterality: N/A;    ESOPHAGOGASTRODUODENOSCOPY N/A 6/5/2024    Procedure: EGD (ESOPHAGOGASTRODUODENOSCOPY);  Surgeon: Odalis Mccabe MD;  Location: [facility];  Service: Endoscopy;  Laterality: N/A;    FLEXIBLE SIGMOIDOSCOPY N/A 6/5/2024    Procedure: SIGMOIDOSCOPY, FLEXIBLE;  Surgeon: Odalis Mccabe MD;  Location: [facility];  Service: Endoscopy;  Laterality: N/A;    FLEXIBLE SIGMOIDOSCOPY N/A 7/16/2024    Procedure: SIGMOIDOSCOPY, FLEXIBLE;  Surgeon: Shan Jean III, MD;  Location: [facility];  Service: Endoscopy;  Laterality: N/A;    FLEXIBLE SIGMOIDOSCOPY N/A 8/13/2024    Procedure: SIGMOIDOSCOPY, FLEXIBLE;  Surgeon: Shan Jean III, MD;  Location: [facility];  Service: Endoscopy;  Laterality: N/A;    GANGLION CYST EXCISION Left 1992    UMBILICAL HERNIA REPAIR  2011    with mesh     Family History   Problem Relation Name Age of Onset    Asthma Mother       Social History     Tobacco Use    Smoking status: Former     Current packs/day: 1.00     Average packs/day: 1 pack/day for 31.7 years (31.7 ttl pk-yrs)     Types: Cigarettes     Start date: 1993    Smokeless tobacco: Never    Tobacco comments:     Pt states he has stopped smoking with Chantix three weeks ago. 12/1/23   Substance Use Topics    Alcohol use: Yes     Comment: ocass    Drug use: Not Currently      Review of Systems   Constitutional: Negative.    Eyes: Negative.    Respiratory:  Positive for shortness of breath.    Cardiovascular: Negative.    Gastrointestinal: Negative.    Genitourinary: Negative.    Musculoskeletal: Negative.    Neurological: Negative.    Hematological: Negative.        Physical Exam     Initial Vitals [08/26/24 1430]   BP Pulse Resp Temp SpO2   (!) 167/80 83 16 98.3 °F (36.8 °C) 100 %      MAP       --         Physical Exam    Vitals reviewed.  Constitutional: He appears well-developed and well-nourished. He is not diaphoretic. No distress.   HENT:   Head: Normocephalic.   Mouth/Throat: Oropharynx is clear and moist.   Eyes: Conjunctivae and EOM are normal. Pupils are equal, round, and reactive to light. Right eye exhibits no discharge. Left eye exhibits no discharge.   Neck:   Normal range of motion.  Cardiovascular:  Normal rate, regular rhythm and normal heart sounds.           Pulmonary/Chest: Breath sounds normal. No respiratory distress. He has no wheezes. He has no rhonchi. He has no rales. He exhibits no tenderness.   Abdominal: Abdomen is soft.   Musculoskeletal:         General: No tenderness or edema. Normal range of motion.      Cervical back: Normal range of motion.      Comments: No swelling, discoloration, tenderness to the bilateral calves.  Patient does have mild tenderness in the upper extremity in the location of the DVT.  No increased swelling or discoloration since yesterday.     Neurological: He is alert and oriented to person, place, and time. He has normal strength.   Skin: Skin is warm. Capillary refill takes less than 2 seconds.   Psychiatric:   Patient very anxious         ED Course   Procedures  Labs Reviewed   MAGNESIUM - Abnormal       Result Value    Magnesium 1.5 (*)    CBC W/ AUTO DIFFERENTIAL - Abnormal    WBC 13.25 (*)     RBC 3.47 (*)     Hemoglobin 10.9 (*)     Hematocrit 34.8 (*)      (*)     MCH 31.4 (*)     MCHC 31.3 (*)     RDW 16.5 (*)      Platelets 211      MPV 10.3      Immature Granulocytes 0.5      Gran # (ANC) 10.2 (*)     Immature Grans (Abs) 0.06 (*)     Lymph # 1.9      Mono # 0.9      Eos # 0.2      Baso # 0.04      nRBC 0      Gran % 77.0 (*)     Lymph % 14.4 (*)     Mono % 6.6      Eosinophil % 1.2      Basophil % 0.3      Differential Method Automated     COMPREHENSIVE METABOLIC PANEL - Abnormal    Sodium 140      Potassium 3.2 (*)     Chloride 103      CO2 27      Glucose 76      BUN 11      Creatinine 1.1      Calcium 8.8      Total Protein 6.5      Albumin 3.8      Total Bilirubin 0.6      Alkaline Phosphatase 42 (*)     AST 17      ALT 17      eGFR >60.0      Anion Gap 10     B-TYPE NATRIURETIC PEPTIDE - Abnormal     (*)    TROPONIN I HIGH SENSITIVITY    Troponin I High Sensitivity 4.1     TSH    TSH 0.936     PROTIME-INR   APTT   PROTIME-INR        ECG Results              EKG 12-lead (In process)        Collection Time Result Time QRS Duration OHS QTC Calculation    08/26/24 15:06:44 08/26/24 15:20:42 76 469                     In process by Interface, Lab In [facility] (08/26/24 15:20:50)                   Narrative:    Test Reason : R06.02,    Vent. Rate : 087 BPM     Atrial Rate : 080 BPM     P-R Int : 000 ms          QRS Dur : 076 ms      QT Int : 390 ms       P-R-T Axes : 000 017 006 degrees     QTc Int : 469 ms    Accelerated Junctional rhythm with occasional Premature ventricular  complexes  Abnormal ECG  When compared with ECG of 25-AUG-2024 11:18,  Junctional rhythm has replaced Sinus rhythm  Nonspecific T wave abnormality no longer evident in Lateral leads    Referred By:             Confirmed By:                                   Imaging Results              X-Ray Chest PA And Lateral (Final result)  Result time 08/26/24 15:25:13      Final result by Samuel Boyle MD (08/26/24 15:25:13)                   Impression:      1. No acute radiographic abnormalities.      Electronically signed by: Samuel  Tamar  Date:    08/26/2024  Time:    15:25               Narrative:    EXAMINATION:  XR CHEST PA AND LATERAL    CLINICAL HISTORY:  Shortness of Breath;    COMPARISON:  August 25, 2024    FINDINGS:  PA and lateral views demonstrate normal heart size.  The mediastinum is unremarkable.  No active infiltrate or effusion is identified.  No acute osseous abnormalities are demonstrated.                                       Medications   acetaminophen tablet 1,000 mg (1,000 mg Oral Given 8/26/24 1644)   HYDROcodone-acetaminophen 5-325 mg per tablet 1 tablet (1 tablet Oral Given 8/26/24 1926)   magnesium sulfate in dextrose IVPB (premix) 1 g (0 g Intravenous Stopped 8/26/24 2136)   potassium bicarbonate disintegrating tablet 40 mEq (40 mEq Oral Given 8/26/24 2027)     Medical Decision Making  49-year-old man past medical history of hypertension, hyperlipidemia, diabetes presents to the emergency department 1 day after he was diagnosed with a DVT in his right upper extremity.  Patient was seen by Dr. Azael Thompson and a full workup was performed including blood work, ultrasound of the upper extremities, CTA for pulmonary embolism which was negative.  Patient is returning today because he has a lot of anxiety about dying.  Patient states he is worried that he is going to go home and die from a clot in his arm.  Patient did receive a dose of Xarelto prior to leaving the hospital and then again this morning.  CBC unremarkable.  Potassium slightly low at 3.2 with a magnesium of 1.5.  Troponin and TSH unremarkable.  BNP at 120.  EKG shows occasional PVCs at a rate of 87 beats per minute. Patient did receive potassium and magnesium replacement.  After discussion with my attending he does not believe that a another CTA is indicated at this time given that the patient just had 1 yesterday which was negative.  I did discuss this in depth with the patient as well as why he is being treated outpatient and not being hospitalized  which is his biggest concern.  I did give him strict return precautions.  He does have a follow up with his primary care provider on Wednesday.  Patient verbalizes understanding of the plan and agreed.  Plan also discussed with my attending and all questions were answered at the bedside.    Amount and/or Complexity of Data Reviewed  Labs: ordered.    Risk  OTC drugs.  Prescription drug management.                                      Clinical Impression:  Final diagnoses:  [R06.02] Shortness of breath  [R45.89] Anxiety about dying (Primary)  [M79.602] Left arm pain          ED Disposition Condition    Discharge Stable          ED Prescriptions    None       Follow-up Information       Follow up With Specialties Details Why Contact Info    Hunter Aguilar III, MD Family Medicine Call   [address] [facility]  [address] [place] [id number] [phone]               Becky Sharma PA-C  08/27/24 0209       Becky Sharma PA-C  08/27/24 0210

## 2024-08-28 ENCOUNTER — OFFICE VISIT (OUTPATIENT)
Dept: FAMILY MEDICINE | Facility: CLINIC | Age: 49
End: 2024-08-28
Payer: COMMERCIAL

## 2024-08-28 ENCOUNTER — OFFICE VISIT (OUTPATIENT)
Dept: WOUND CARE | Facility: HOSPITAL | Age: 49
End: 2024-08-28
Attending: FAMILY MEDICINE
Payer: COMMERCIAL

## 2024-08-28 VITALS — RESPIRATION RATE: 18 BRPM | HEART RATE: 76 BPM | TEMPERATURE: 98 F

## 2024-08-28 VITALS
TEMPERATURE: 98 F | RESPIRATION RATE: 18 BRPM | WEIGHT: 271.19 LBS | DIASTOLIC BLOOD PRESSURE: 72 MMHG | OXYGEN SATURATION: 95 % | BODY MASS INDEX: 45.18 KG/M2 | HEART RATE: 91 BPM | HEIGHT: 65 IN | SYSTOLIC BLOOD PRESSURE: 134 MMHG

## 2024-08-28 DIAGNOSIS — S41.102D OPEN WOUND OF LEFT UPPER ARM, SUBSEQUENT ENCOUNTER: ICD-10-CM

## 2024-08-28 DIAGNOSIS — F41.9 ANXIETY: ICD-10-CM

## 2024-08-28 DIAGNOSIS — L08.9 WOUND INFECTION: ICD-10-CM

## 2024-08-28 DIAGNOSIS — I82.A11 ACUTE DEEP VEIN THROMBOSIS (DVT) OF AXILLARY VEIN OF RIGHT UPPER EXTREMITY: Primary | ICD-10-CM

## 2024-08-28 DIAGNOSIS — L02.818 CUTANEOUS ABSCESS OF OTHER SITE: Primary | ICD-10-CM

## 2024-08-28 DIAGNOSIS — T14.8XXA WOUND INFECTION: ICD-10-CM

## 2024-08-28 DIAGNOSIS — S41.102A ARM WOUND, LEFT, INITIAL ENCOUNTER: ICD-10-CM

## 2024-08-28 LAB
BACTERIA BLD CULT: NORMAL
BACTERIA BLD CULT: NORMAL

## 2024-08-28 PROCEDURE — 3044F HG A1C LEVEL LT 7.0%: CPT | Mod: CPTII,,, | Performed by: FAMILY MEDICINE

## 2024-08-28 PROCEDURE — 99214 OFFICE O/P EST MOD 30 MIN: CPT | Mod: S$GLB,,, | Performed by: NURSE PRACTITIONER

## 2024-08-28 PROCEDURE — 1159F MED LIST DOCD IN RCRD: CPT | Mod: CPTII,,, | Performed by: FAMILY MEDICINE

## 2024-08-28 PROCEDURE — 1111F DSCHRG MED/CURRENT MED MERGE: CPT | Mod: CPTII,S$GLB,, | Performed by: NURSE PRACTITIONER

## 2024-08-28 PROCEDURE — 3075F SYST BP GE 130 - 139MM HG: CPT | Mod: CPTII,S$GLB,, | Performed by: NURSE PRACTITIONER

## 2024-08-28 PROCEDURE — 99999 PR PBB SHADOW E&M-EST. PATIENT-LVL V: CPT | Mod: PBBFAC,,, | Performed by: NURSE PRACTITIONER

## 2024-08-28 PROCEDURE — 3072F LOW RISK FOR RETINOPATHY: CPT | Mod: CPTII,,, | Performed by: FAMILY MEDICINE

## 2024-08-28 PROCEDURE — 1160F RVW MEDS BY RX/DR IN RCRD: CPT | Mod: CPTII,,, | Performed by: FAMILY MEDICINE

## 2024-08-28 PROCEDURE — 3044F HG A1C LEVEL LT 7.0%: CPT | Mod: CPTII,S$GLB,, | Performed by: NURSE PRACTITIONER

## 2024-08-28 PROCEDURE — 1159F MED LIST DOCD IN RCRD: CPT | Mod: CPTII,S$GLB,, | Performed by: NURSE PRACTITIONER

## 2024-08-28 PROCEDURE — 3078F DIAST BP <80 MM HG: CPT | Mod: CPTII,S$GLB,, | Performed by: NURSE PRACTITIONER

## 2024-08-28 PROCEDURE — 1111F DSCHRG MED/CURRENT MED MERGE: CPT | Mod: CPTII,,, | Performed by: FAMILY MEDICINE

## 2024-08-28 PROCEDURE — 3008F BODY MASS INDEX DOCD: CPT | Mod: CPTII,S$GLB,, | Performed by: NURSE PRACTITIONER

## 2024-08-28 PROCEDURE — 1160F RVW MEDS BY RX/DR IN RCRD: CPT | Mod: CPTII,S$GLB,, | Performed by: NURSE PRACTITIONER

## 2024-08-28 PROCEDURE — 99214 OFFICE O/P EST MOD 30 MIN: CPT | Mod: ,,, | Performed by: FAMILY MEDICINE

## 2024-08-28 PROCEDURE — 99213 OFFICE O/P EST LOW 20 MIN: CPT | Mod: PN | Performed by: FAMILY MEDICINE

## 2024-08-28 PROCEDURE — 3072F LOW RISK FOR RETINOPATHY: CPT | Mod: CPTII,S$GLB,, | Performed by: NURSE PRACTITIONER

## 2024-08-28 RX ORDER — BUSPIRONE HYDROCHLORIDE 15 MG/1
15 TABLET ORAL 3 TIMES DAILY
Qty: 90 TABLET | Refills: 5 | Status: SHIPPED | OUTPATIENT
Start: 2024-08-28 | End: 2025-08-28

## 2024-08-28 RX ORDER — SERTRALINE HYDROCHLORIDE 50 MG/1
50 TABLET, FILM COATED ORAL DAILY
Qty: 90 TABLET | Refills: 1 | Status: SHIPPED | OUTPATIENT
Start: 2024-08-28 | End: 2025-08-28

## 2024-08-28 RX ORDER — SERTRALINE HYDROCHLORIDE 100 MG/1
100 TABLET, FILM COATED ORAL DAILY
Qty: 90 TABLET | Refills: 1 | Status: SHIPPED | OUTPATIENT
Start: 2024-08-28 | End: 2025-08-28

## 2024-08-28 RX ORDER — LORAZEPAM 0.5 MG/1
0.5 TABLET ORAL EVERY 6 HOURS PRN
Qty: 90 TABLET | Refills: 5 | Status: SHIPPED | OUTPATIENT
Start: 2024-08-28 | End: 2024-09-27

## 2024-08-28 NOTE — PROGRESS NOTES
SUBJECTIVE:      Patient ID: Jacoby Jean-Baptiste is a 49 y.o. male.    Chief Complaint: Follow-up (ED)    HPI  Is here for follow up from 3 er visits- first wound in arm (on doxy), second blood clots, third anxiety bc of blood clots  Vss; bp is well controlled  Denies chest pain  Denies sob  Denies fever  Denies chills    Has anxiety  Has headache that has had since Sunday- all over; light and sound do not effect it  Sleep has not been good    Is on ambien-  checked; nonsuicidal  Is on simvastatin for chol  Is on zoloft 100 mg for anxiety/depression  Is on semaglutide 2.0 mg sq q week for dm2  Is on xarelto 15 mg bid with meals for blood clots  Is on phenegran for nausea due to ulcerative colitis  Is finishing prednisone for ulcerative colitis  Is on metoprolol for bp  Is on metformin for dm2  Is on ativan 0.5 mg for anxiety;  checked; nonsuicidal  Is on creon for ulcerative colitis  Is on questrtan for ulcerative colitis  Is on buspar 10mg for anxiety  Is on budensonide ulcerative colitis      First went to ER for wound on upper arm- wound care suggested er  Has been since out and on doxy  Went to wound clinic today  Wound today is bandaged  Around it looks good  He has a picture of it unbandadged it looks good; however pt states it had a yellowish green discharge  Dr constantino infectious disease was managing wound when in hospital  Will consult infectious disease    Went back to er for sob and knot feeling in right upper chest  Dvt seen in right upper ext  Is on starter pack xarelto  When finishes that will order and start xarelto 20 mg once daily with meals  Will recheck an ultrasound in 2 months    Went the third time to the er for sob and anxiety  Is currently on ativan 0.5 mg bid prn; buspar 10 mg tid; and zoloft 100 mg  Does not feel as though is working  Is not suicidal  Will increase zoloft to 150 mg daily  Will increase ativan to 0.5 mg tid prn  Will increase buspar to 15 mg tid  Will also consult  psychiatry    Also has arm pain  Bilateral   Will have a follow up in one month or sooner if needed      Past Surgical History:   Procedure Laterality Date    BRONCHOSCOPY WITH FLUOROSCOPY Left 11/20/2023    Procedure: BRONCHOSCOPY, WITH FLUOROSCOPY;  Surgeon: Lisa Villarreal MD;  Location: [facility];  Service: Pulmonary;  Laterality: Left;    BRONCHOSCOPY WITH FLUOROSCOPY N/A 11/30/2023    Procedure: BRONCHOSCOPY, WITH FLUOROSCOPY;  Surgeon: Lisa Villarreal MD;  Location: [facility];  Service: Pulmonary;  Laterality: N/A;    CARDIAC ELECTROPHYSIOLOGY MAPPING AND ABLATION  2017    SVT    CHOLECYSTECTOMY  2011    COLONOSCOPY N/A 5/3/2022    Procedure: COLONOSCOPY;  Surgeon: Shan Jean III, MD;  Location: [facility];  Service: Endoscopy;  Laterality: N/A;    COLONOSCOPY N/A 3/16/2024    Procedure: COLONOSCOPY;  Surgeon: ALEKSANDER Ramos MD;  Location: [facility];  Service: Endoscopy;  Laterality: N/A;    COLONOSCOPY WITH FECAL MICROBIOTA TRANSFER N/A 3/21/2023    Procedure: COLONOSCOPY, WITH FECAL MICROBIOTA TRANSFER;  Surgeon: David Millan MD;  Location: [facility];  Service: Endoscopy;  Laterality: N/A;    ESOPHAGOGASTRODUODENOSCOPY N/A 4/24/2023    Procedure: EGD (ESOPHAGOGASTRODUODENOSCOPY);  Surgeon: Shan Jean III, MD;  Location: [facility];  Service: Endoscopy;  Laterality: N/A;    ESOPHAGOGASTRODUODENOSCOPY N/A 6/5/2024    Procedure: EGD (ESOPHAGOGASTRODUODENOSCOPY);  Surgeon: Odalis Mccabe MD;  Location: [facility];  Service: Endoscopy;  Laterality: N/A;    FLEXIBLE SIGMOIDOSCOPY N/A 6/5/2024    Procedure: SIGMOIDOSCOPY, FLEXIBLE;  Surgeon: Odalis Mccabe MD;  Location: [facility];  Service: Endoscopy;  Laterality: N/A;    FLEXIBLE SIGMOIDOSCOPY N/A 7/16/2024    Procedure: SIGMOIDOSCOPY, FLEXIBLE;  Surgeon: Shan Jean III, MD;  Location: [facility];  Service: Endoscopy;  Laterality: N/A;    FLEXIBLE SIGMOIDOSCOPY N/A 8/13/2024    Procedure:  SIGMOIDOSCOPY, FLEXIBLE;  Surgeon: Shan Jean III, MD;  Location: [facility];  Service: Endoscopy;  Laterality: N/A;    GANGLION CYST EXCISION Left 1992    UMBILICAL HERNIA REPAIR  2011    with mesh     Family History   Problem Relation Name Age of Onset    Asthma Mother        Social History     Socioeconomic History    Marital status: Single   Tobacco Use    Smoking status: Former     Current packs/day: 1.00     Average packs/day: 1 pack/day for 31.7 years (31.7 ttl pk-yrs)     Types: Cigarettes     Start date: 1993    Smokeless tobacco: Never    Tobacco comments:     Pt states he has stopped smoking with Chantix three weeks ago. 12/1/23   Substance and Sexual Activity    Alcohol use: Yes     Comment: ocass    Drug use: Not Currently    Sexual activity: Not Currently     Social Determinants of Health     Financial Resource Strain: Low Risk  (8/10/2024)    Overall Financial Resource Strain (CARDIA)     Difficulty of Paying Living Expenses: Not hard at all   Food Insecurity: No Food Insecurity (8/10/2024)    Hunger Vital Sign     Worried About Running Out of Food in the Last Year: Never true     Ran Out of Food in the Last Year: Never true   Transportation Needs: No Transportation Needs (8/10/2024)    TRANSPORTATION NEEDS     Transportation : No   Physical Activity: Inactive (8/10/2024)    Exercise Vital Sign     Days of Exercise per Week: 0 days     Minutes of Exercise per Session: 0 min   Stress: No Stress Concern Present (8/10/2024)    [employer] [place] [employer] - Occupational Stress Questionnaire     Feeling of Stress : Not at all   Housing Stability: Low Risk  (8/10/2024)    Housing Stability Vital Sign     Unable to Pay for Housing in the Last Year: No     Homeless in the Last Year: No     Current Outpatient Medications   Medication Sig Dispense Refill    budesonide (ENTOCORT EC) 3 mg capsule Take 3 capsules (9 mg total) by mouth once daily. 90 capsule 0     cholestyramine (QUESTRAN) 4 gram packet Take 1 packet by mouth once daily.      doxycycline (VIBRAMYCIN) 100 MG Cap Take 1 capsule (100 mg total) by mouth 2 (two) times daily. for 10 days 20 capsule 0    ergocalciferol (VITAMIN D2) 50,000 unit Cap Take 1 capsule (50,000 Units total) by mouth every 7 days. 12 capsule 1    hyoscyamine (LEVBID) 0.375 mg Tb12 Take 375 mcg by mouth 2 (two) times daily.      lipase-protease-amylase 12,000-38,000-60,000 units (CREON) CpDR Take 3 capsules by mouth 3 (three) times daily with meals. 270 capsule 11    metFORMIN (GLUCOPHAGE-XR) 500 MG ER 24hr tablet Take 1 tablet (500 mg total) by mouth 2 (two) times daily with meals. 180 tablet 1    metoprolol succinate (TOPROL-XL) 50 MG 24 hr tablet Take 1 tablet (50 mg total) by mouth once daily. 90 tablet 1    predniSONE (DELTASONE) 20 MG tablet Take 1 tablet (20 mg total) by mouth once daily. for 12 days 12 tablet 0    promethazine (PHENERGAN) 12.5 MG Tab Take 12.5 mg by mouth 4 (four) times daily as needed.      rivaroxaban (XARELTO) 15 mg Tab Take 1 tablet (15 mg total) by mouth 2 (two) times daily with meals. for 21 days 42 tablet 0    semaglutide (OZEMPIC) 2 mg/dose (8 mg/3 mL) PnIj Inject 2 mg into the skin every 7 days. 3 mL 5    simvastatin (ZOCOR) 20 MG tablet Take 1 tablet (20 mg total) by mouth every evening. (Patient taking differently: Take 20 mg by mouth once daily.) 90 tablet 1    zolpidem (AMBIEN) 10 mg Tab Take 1 tablet (10 mg total) by mouth nightly as needed (insomnia). 30 tablet 2    busPIRone (BUSPAR) 15 MG tablet Take 1 tablet (15 mg total) by mouth 3 (three) times daily. 90 tablet 5    LORazepam (ATIVAN) 0.5 MG tablet Take 1 tablet (0.5 mg total) by mouth every 6 (six) hours as needed for Anxiety. 90 tablet 5    rivaroxaban (XARELTO) 20 mg Tab Take 1 tablet (20 mg total) by mouth daily with dinner or evening meal. 90 tablet 1    sertraline (ZOLOFT) 100 MG tablet Take 1 tablet (100 mg total) by mouth  once daily. 90 tablet 1    sertraline (ZOLOFT) 50 MG tablet Take 1 tablet (50 mg total) by mouth once daily. 90 tablet 1     No current facility-administered medications for this visit.     Facility-Administered Medications Ordered in Other Visits   Medication Dose Route Frequency Provider Last Rate Last Admin    lactated ringers infusion   Intravenous Continuous David Millan MD 75 mL/hr at 03/21/23 1252 New Bag at 03/21/23 1252     Review of patient's allergies indicates:  No Known Allergies   Past Medical History:   Diagnosis Date    C. difficile colitis     Cavitary pneumonia 11/2023    pos aspergillus serology    Diabetes mellitus 01/2022    Hyperlipidemia 2015    Hypertension 2015    Insomnia 2015    Ulcerative colitis      Past Surgical History:   Procedure Laterality Date    BRONCHOSCOPY WITH FLUOROSCOPY Left 11/20/2023    Procedure: BRONCHOSCOPY, WITH FLUOROSCOPY;  Surgeon: Lisa Villarreal MD;  Location: [facility];  Service: Pulmonary;  Laterality: Left;    BRONCHOSCOPY WITH FLUOROSCOPY N/A 11/30/2023    Procedure: BRONCHOSCOPY, WITH FLUOROSCOPY;  Surgeon: Lisa Villarreal MD;  Location: [facility];  Service: Pulmonary;  Laterality: N/A;    CARDIAC ELECTROPHYSIOLOGY MAPPING AND ABLATION  2017    SVT    CHOLECYSTECTOMY  2011    COLONOSCOPY N/A 5/3/2022    Procedure: COLONOSCOPY;  Surgeon: Shan Jean III, MD;  Location: [facility];  Service: Endoscopy;  Laterality: N/A;    COLONOSCOPY N/A 3/16/2024    Procedure: COLONOSCOPY;  Surgeon: ALEKSANDER Ramos MD;  Location: [facility];  Service: Endoscopy;  Laterality: N/A;    COLONOSCOPY WITH FECAL MICROBIOTA TRANSFER N/A 3/21/2023    Procedure: COLONOSCOPY, WITH FECAL MICROBIOTA TRANSFER;  Surgeon: David Millan MD;  Location: [facility];  Service: Endoscopy;  Laterality: N/A;    ESOPHAGOGASTRODUODENOSCOPY N/A 4/24/2023    Procedure: EGD (ESOPHAGOGASTRODUODENOSCOPY);  Surgeon: Shan Jean III, MD;  Location: [facility];  " Service: Endoscopy;  Laterality: N/A;    ESOPHAGOGASTRODUODENOSCOPY N/A 6/5/2024    Procedure: EGD (ESOPHAGOGASTRODUODENOSCOPY);  Surgeon: Odalis Mccabe MD;  Location: [facility];  Service: Endoscopy;  Laterality: N/A;    FLEXIBLE SIGMOIDOSCOPY N/A 6/5/2024    Procedure: SIGMOIDOSCOPY, FLEXIBLE;  Surgeon: Odalis Mccabe MD;  Location: [facility];  Service: Endoscopy;  Laterality: N/A;    FLEXIBLE SIGMOIDOSCOPY N/A 7/16/2024    Procedure: SIGMOIDOSCOPY, FLEXIBLE;  Surgeon: Shan Jean III, MD;  Location: [facility];  Service: Endoscopy;  Laterality: N/A;    FLEXIBLE SIGMOIDOSCOPY N/A 8/13/2024    Procedure: SIGMOIDOSCOPY, FLEXIBLE;  Surgeon: Shan Jean III, MD;  Location: [facility];  Service: Endoscopy;  Laterality: N/A;    GANGLION CYST EXCISION Left 1992    UMBILICAL HERNIA REPAIR  2011    with mesh       Review of Systems   Skin:         Wound pain noted on bilateral arms  Also wound bandage over left upper arm   All other systems reviewed and are negative.     OBJECTIVE:      Vitals:    08/28/24 1232   BP: 134/72   BP Location: Right arm   Patient Position: Sitting   BP Method: Large (Manual)   Pulse: 91   Resp: 18   Temp: 98.1 °F (36.7 °C)   SpO2: 95%   Weight: 123 kg (271 lb 2.7 oz)   Height: 5' 5" (1.651 m)     Physical Exam  Vitals and nursing note reviewed.   Constitutional:       Appearance: Normal appearance. He is obese.   HENT:      Head: Normocephalic and atraumatic.   Eyes:      Pupils: Pupils are equal, round, and reactive to light.   Cardiovascular:      Rate and Rhythm: Normal rate and regular rhythm.      Pulses: Normal pulses.      Heart sounds: Normal heart sounds.   Pulmonary:      Effort: Pulmonary effort is normal.      Breath sounds: Normal breath sounds.   Musculoskeletal:      Cervical back: Normal range of motion.   Skin:     Comments: Wound today is bandaged  Around it looks good  He has a picture of it unbandadged it looks good; however pt states it had a " yellowish green discharge    Is located medial left upper ext   Neurological:      General: No focal deficit present.      Mental Status: He is alert and oriented to person, place, and time. Mental status is at baseline.   Psychiatric:         Mood and Affect: Mood normal.         Behavior: Behavior normal.         Thought Content: Thought content normal.         Judgment: Judgment normal.      Comments: nonsuicidal      Assessment:       1. Acute deep vein thrombosis (DVT) of axillary vein of right upper extremity    2. Arm wound, left, initial encounter    3. Anxiety        Plan:       Acute deep vein thrombosis (DVT) of axillary vein of right upper extremity  -      Upper Extremity Veins Right; Future; Expected date: 08/28/2024  -     rivaroxaban (XARELTO) 20 mg Tab; Take 1 tablet (20 mg total) by mouth daily with dinner or evening meal.  Dispense: 90 tablet; Refill: 1    Arm wound, left, initial encounter  -     Ambulatory referral/consult to Infectious Disease; Future; Expected date: 08/28/2024    Anxiety  -     sertraline (ZOLOFT) 100 MG tablet; Take 1 tablet (100 mg total) by mouth once daily.  Dispense: 90 tablet; Refill: 1  -     sertraline (ZOLOFT) 50 MG tablet; Take 1 tablet (50 mg total) by mouth once daily.  Dispense: 90 tablet; Refill: 1  -     LORazepam (ATIVAN) 0.5 MG tablet; Take 1 tablet (0.5 mg total) by mouth every 6 (six) hours as needed for Anxiety.  Dispense: 90 tablet; Refill: 5  -     busPIRone (BUSPAR) 15 MG tablet; Take 1 tablet (15 mg total) by mouth 3 (three) times daily.  Dispense: 90 tablet; Refill: 5  -     Ambulatory referral/consult to Psychiatry; Future; Expected date: 08/28/2024  -     Ambulatory referral/consult to Psychiatry; Future; Expected date: 08/28/2024        No follow-ups on file.    Continue medications  See infectious disease  Continue with wound care clinic    Continue xarelto starter pack of 15 mg bid with meals for a total of 21 days  Then switch to the xarelto 20  mg once/day with meals  Then in 2 months ct     For anxiety- will increase to ativan to 0.5 mg tid prn  Increase buspar to 15 mg tid  Will increase zoloft to 150 mg daily  Will also send to see psych    Am unable to send pain meds as was just sent 3 days ago for arm pain      8/28/2024 VICKY Solis, FNP

## 2024-08-28 NOTE — PROGRESS NOTES
Wound Care Progress Note    Subjective:       Patient ID: Jacoby Jean-Baptiste is a 49 y.o. male.    Chief Complaint: Wound Check    Wound Check  Pt seen in clinic for a FU visit for a left arm abscess. Wound has purulent drainage, but has started doxycycline. Continue with the antibiotic. Pt als has a DVT in the right arm, doppler below. Pt has no other complaints today  Review of Systems   Skin:  Positive for wound.        Open wound left arm   All other systems reviewed and are negative.      Objective:        Physical Exam  Vitals and nursing note reviewed.   Constitutional:       General: He is not in acute distress.     Appearance: Normal appearance.   Skin:     General: Skin is warm and dry.      Findings: Lesion present.      Comments: Left arm open wound, abscess, see wound care assessment documentation in chart review scanned under the media tab   Neurological:      General: No focal deficit present.      Mental Status: He is alert.   Psychiatric:         Mood and Affect: Mood normal.         Judgment: Judgment normal.       There were no vitals filed for this visit.  US UPPER EXTREMITY VEINS RIGHT     CLINICAL HISTORY:  concern for dvt and superficial thrombophlembitis;     COMPARISON:  None.     FINDINGS:  Color and duplex Doppler sonographic assessment of the right upper extremity with spectral analysis was performed.     The right internal jugular and subclavian veins are patent.  Partially occlusive thrombus is noted in the right axillary vein, and throughout the course of the right brachial vein.  Basilic and cephalic veins are patent.  Radial and ulnar veins are normal in appearance.  Veins     Impression:     1. Exam is positive for deep vein thrombosis, with partially occlusive thrombus noted in the right axillary vein and throughout the course of the right brachial vein.     Assessment:           ICD-10-CM ICD-9-CM   1. Cutaneous abscess of other site  L02.818 682.8   2. Open wound of left  "upper arm, subsequent encounter  S41.102D V58.89     880.03                Plan:                  Doziermichelle Waters" was seen today for wound check.    Diagnoses and all orders for this visit:    Cutaneous abscess of other site    Open wound of left upper arm, subsequent encounter      Continue with the doxycycline for infection    Please see wound care instructions which have been provided separately.           "

## 2024-08-29 ENCOUNTER — PATIENT OUTREACH (OUTPATIENT)
Dept: ADMINISTRATIVE | Facility: OTHER | Age: 49
End: 2024-08-29
Payer: COMMERCIAL

## 2024-08-29 LAB
OHS QRS DURATION: 76 MS
OHS QTC CALCULATION: 437 MS

## 2024-08-29 NOTE — PROGRESS NOTES
CHW - Case Closure    This Community Health Worker spoke to patient via telephone today.     Pt/Caregiver reported: pt stated he has no social needs at this time (please see [facility] questionnaire completed on August 10, 2024)    Pt/Caregiver denied any additional needs at this time and agrees with episode closure at this time.  Provided patient with Community Health Worker's contact information and encouraged him/her to contact this Community Health Worker if additional needs arise.

## 2024-09-01 LAB
OHS QRS DURATION: 76 MS
OHS QTC CALCULATION: 469 MS

## 2024-09-03 ENCOUNTER — HOSPITAL ENCOUNTER (EMERGENCY)
Facility: HOSPITAL | Age: 49
Discharge: HOME | End: 2024-09-03
Attending: EMERGENCY MEDICINE
Payer: COMMERCIAL

## 2024-09-03 ENCOUNTER — TELEPHONE (OUTPATIENT)
Dept: PSYCHIATRY | Facility: CLINIC | Age: 49
End: 2024-09-03
Payer: COMMERCIAL

## 2024-09-03 VITALS
SYSTOLIC BLOOD PRESSURE: 141 MMHG | HEART RATE: 79 BPM | TEMPERATURE: 99 F | BODY MASS INDEX: 44.98 KG/M2 | OXYGEN SATURATION: 98 % | WEIGHT: 270 LBS | DIASTOLIC BLOOD PRESSURE: 75 MMHG | HEIGHT: 65 IN | RESPIRATION RATE: 16 BRPM

## 2024-09-03 DIAGNOSIS — R07.9 CHEST PAIN: ICD-10-CM

## 2024-09-03 DIAGNOSIS — E83.42 HYPOMAGNESEMIA: Primary | ICD-10-CM

## 2024-09-03 LAB
ALBUMIN SERPL BCP-MCNC: 3.9 G/DL (ref 3.5–5.2)
ALP SERPL-CCNC: 49 U/L (ref 55–135)
ALT SERPL W/O P-5'-P-CCNC: 11 U/L (ref 10–44)
ANION GAP SERPL CALC-SCNC: 10 MMOL/L (ref 8–16)
AST SERPL-CCNC: 13 U/L (ref 10–40)
BASOPHILS # BLD AUTO: 0.02 K/UL (ref 0–0.2)
BASOPHILS NFR BLD: 0.3 % (ref 0–1.9)
BILIRUB SERPL-MCNC: 0.4 MG/DL (ref 0.1–1)
BNP SERPL-MCNC: 46 PG/ML (ref 0–99)
BUN SERPL-MCNC: 10 MG/DL (ref 6–20)
CALCIUM SERPL-MCNC: 8.9 MG/DL (ref 8.7–10.5)
CHLORIDE SERPL-SCNC: 104 MMOL/L (ref 95–110)
CO2 SERPL-SCNC: 27 MMOL/L (ref 23–29)
CREAT SERPL-MCNC: 1 MG/DL (ref 0.5–1.4)
DIFFERENTIAL METHOD BLD: ABNORMAL
EOSINOPHIL # BLD AUTO: 0.2 K/UL (ref 0–0.5)
EOSINOPHIL NFR BLD: 3.2 % (ref 0–8)
ERYTHROCYTE [DISTWIDTH] IN BLOOD BY AUTOMATED COUNT: 16.6 % (ref 11.5–14.5)
EST. GFR  (NO RACE VARIABLE): >60 ML/MIN/1.73 M^2
GLUCOSE SERPL-MCNC: 88 MG/DL (ref 70–110)
HCT VFR BLD AUTO: 38.1 % (ref 40–54)
HGB BLD-MCNC: 12 G/DL (ref 14–18)
IMM GRANULOCYTES # BLD AUTO: 0.03 K/UL (ref 0–0.04)
IMM GRANULOCYTES NFR BLD AUTO: 0.4 % (ref 0–0.5)
LYMPHOCYTES # BLD AUTO: 1.4 K/UL (ref 1–4.8)
LYMPHOCYTES NFR BLD: 18.8 % (ref 18–48)
MAGNESIUM SERPL-MCNC: 1.2 MG/DL (ref 1.6–2.6)
MCH RBC QN AUTO: 31.3 PG (ref 27–31)
MCHC RBC AUTO-ENTMCNC: 31.5 G/DL (ref 32–36)
MCV RBC AUTO: 99 FL (ref 82–98)
MONOCYTES # BLD AUTO: 0.6 K/UL (ref 0.3–1)
MONOCYTES NFR BLD: 8.5 % (ref 4–15)
NEUTROPHILS # BLD AUTO: 4.9 K/UL (ref 1.8–7.7)
NEUTROPHILS NFR BLD: 68.8 % (ref 38–73)
NRBC BLD-RTO: 0 /100 WBC
OHS QRS DURATION: 74 MS
OHS QTC CALCULATION: 450 MS
PLATELET # BLD AUTO: 196 K/UL (ref 150–450)
PMV BLD AUTO: 10.5 FL (ref 9.2–12.9)
POTASSIUM SERPL-SCNC: 3.7 MMOL/L (ref 3.5–5.1)
PROT SERPL-MCNC: 6.7 G/DL (ref 6–8.4)
RBC # BLD AUTO: 3.84 M/UL (ref 4.6–6.2)
SODIUM SERPL-SCNC: 141 MMOL/L (ref 136–145)
TROPONIN I SERPL HS-MCNC: 3.1 PG/ML (ref 0–14.9)
TROPONIN I SERPL HS-MCNC: 3.5 PG/ML (ref 0–14.9)
WBC # BLD AUTO: 7.18 K/UL (ref 3.9–12.7)

## 2024-09-03 PROCEDURE — 96365 THER/PROPH/DIAG IV INF INIT: CPT

## 2024-09-03 PROCEDURE — 96366 THER/PROPH/DIAG IV INF ADDON: CPT

## 2024-09-03 PROCEDURE — 80053 COMPREHEN METABOLIC PANEL: CPT | Performed by: PHYSICIAN ASSISTANT

## 2024-09-03 PROCEDURE — 83735 ASSAY OF MAGNESIUM: CPT | Performed by: PHYSICIAN ASSISTANT

## 2024-09-03 PROCEDURE — 83880 ASSAY OF NATRIURETIC PEPTIDE: CPT | Performed by: PHYSICIAN ASSISTANT

## 2024-09-03 PROCEDURE — 93010 ELECTROCARDIOGRAM REPORT: CPT | Mod: ,,, | Performed by: INTERNAL MEDICINE

## 2024-09-03 PROCEDURE — 99285 EMERGENCY DEPT VISIT HI MDM: CPT | Mod: 25

## 2024-09-03 PROCEDURE — 85025 COMPLETE CBC W/AUTO DIFF WBC: CPT | Performed by: PHYSICIAN ASSISTANT

## 2024-09-03 PROCEDURE — 93005 ELECTROCARDIOGRAM TRACING: CPT | Performed by: INTERNAL MEDICINE

## 2024-09-03 PROCEDURE — 63600175 PHARM REV CODE 636 W HCPCS: Performed by: EMERGENCY MEDICINE

## 2024-09-03 PROCEDURE — 84484 ASSAY OF TROPONIN QUANT: CPT | Performed by: PHYSICIAN ASSISTANT

## 2024-09-03 RX ORDER — MAGNESIUM SULFATE HEPTAHYDRATE 40 MG/ML
2 INJECTION, SOLUTION INTRAVENOUS ONCE
Status: COMPLETED | OUTPATIENT
Start: 2024-09-03 | End: 2024-09-03

## 2024-09-03 RX ORDER — PANTOPRAZOLE SODIUM 40 MG/1
40 TABLET, DELAYED RELEASE ORAL 2 TIMES DAILY
COMMUNITY
Start: 2024-08-25

## 2024-09-03 RX ADMIN — MAGNESIUM SULFATE HEPTAHYDRATE 2 G: 40 INJECTION, SOLUTION INTRAVENOUS at 02:09

## 2024-09-03 NOTE — FIRST PROVIDER EVALUATION
Emergency Department TeleTriage Encounter Note      CHIEF COMPLAINT    Chief Complaint   Patient presents with    Chest Pain     ONSET 1 HR AGO    Shortness of Breath     ONSET 1 HR AGO       VITAL SIGNS   Initial Vitals [09/03/24 1054]   BP Pulse Resp Temp SpO2   (!) 142/97 88 18 97.3 °F (36.3 °C) 98 %      MAP       --            ALLERGIES    Review of patient's allergies indicates:  No Known Allergies    PROVIDER TRIAGE NOTE  This is a teletriage evaluation of a 49 y.o. male presenting to the ED complaining of chest pain. Patient has had shortness of breath for a few days. He has had chest pain for about one hour. He states symptoms are worse with exertion.    Patient is alert and oriented. He speaks in complete sentences. He is sitting upright in the chair in no distress.     Initial orders will be placed and care will be transferred to an alternate provider when patient is roomed for a full evaluation. Any additional orders and the final disposition will be determined by that provider.         ORDERS  Labs Reviewed   MAGNESIUM   CBC W/ AUTO DIFFERENTIAL   COMPREHENSIVE METABOLIC PANEL   TROPONIN I HIGH SENSITIVITY   TROPONIN I HIGH SENSITIVITY   B-TYPE NATRIURETIC PEPTIDE       ED Orders (720h ago, onward)      Start Ordered     Status Ordering Provider    09/03/24 1304 09/03/24 1103  Troponin I High Sensitivity #2  Once Timed         Ordered CLAUDIA ALMARAZ.    09/03/24 1104 09/03/24 1103  Magnesium  STAT         Ordered CLAUDIA ALMARAZ    09/03/24 1104 09/03/24 1103  Vital signs  Every 15 min         Ordered CLAUDIA ALMARAZ G.    09/03/24 1104 09/03/24 1103  Cardiac Monitoring - Adult  Continuous        Comments: Notify Physician If:    Ordered CLAUDIA ALMARAZ G.    09/03/24 1104 09/03/24 1103  Pulse Oximetry Continuous  Continuous         Ordered CLAUDIA ALMARAZ G.    09/03/24 1104 09/03/24 1103  Diet NPO  Diet effective now         Ordered CLAUDIA ALMARAZ G.    09/03/24 1104 09/03/24 1103  Saline lock IV  Once          Ordered CLAUDIA ALMARAZ.    09/03/24 1104 09/03/24 1103  CBC auto differential  STAT         Ordered CLAUDIA ALMARAZ.    09/03/24 1104 09/03/24 1103  Comprehensive metabolic panel  STAT         Ordered CLAUDIA ALMARAZ.    09/03/24 1104 09/03/24 1103  Troponin I High Sensitivity #1  STAT         Ordered CLAUDIA ALMARAZ.    09/03/24 1104 09/03/24 1103  B-Type natriuretic peptide (BNP)  STAT         Ordered CLAUDIA ALMARAZ.    09/03/24 1104 09/03/24 1103  X-Ray Chest AP Portable  1 time imaging         Ordered CLAUDIA ALMARAZ.    09/03/24 1052 09/03/24 1051  EKG 12-lead  Once         In process HOLDSWORTH, ALAYNA              Virtual Visit Note: The provider triage portion of this emergency department evaluation and documentation was performed via [employer], a HIPAA-compliant telemedicine application, in concert with a tele-presenter in the room. A face to face patient evaluation with one of my colleagues will occur once the patient is placed in an emergency department room.      DISCLAIMER: This note was prepared with [employer] voice recognition transcription software. Garbled syntax, mangled pronouns, and other bizarre constructions may be attributed to that software system.

## 2024-09-03 NOTE — PHARMACY MED REC
"              .        Admission Medication History     The home medication history was taken by Yu Holloway.    You may go to "Admission" then "Reconcile Home Medications" tabs to review and/or act upon these items.     The home medication list has been updated by the Pharmacy department.   Please read ALL comments highlighted in yellow.   Please address this information as you see fit.    Feel free to contact us if you have any questions or require assistance.        Medications listed below were obtained from: Patient/family and Analytic software- [employer]  Current Facility-Administered Medications on File Prior to Encounter   Medication Dose Route Frequency Provider Last Rate Last Admin    lactated ringers infusion   Intravenous Continuous David Millan MD 75 mL/hr at 03/21/23 1252 New Bag at 03/21/23 1252     Current Outpatient Medications on File Prior to Encounter   Medication Sig Dispense Refill    busPIRone (BUSPAR) 15 MG tablet Take 1 tablet (15 mg total) by mouth 3 (three) times daily. 90 tablet 5    cholestyramine (QUESTRAN) 4 gram packet Take 1 packet by mouth 3 (three) times daily.      doxycycline (VIBRAMYCIN) 100 MG Cap Take 1 capsule (100 mg total) by mouth 2 (two) times daily. for 10 days 20 capsule 0    hyoscyamine (LEVBID) 0.375 mg Tb12 Take 375 mcg by mouth 2 (two) times daily.      LORazepam (ATIVAN) 0.5 MG tablet Take 1 tablet (0.5 mg total) by mouth every 6 (six) hours as needed for Anxiety. 90 tablet 5    metFORMIN (GLUCOPHAGE-XR) 500 MG ER 24hr tablet Take 1 tablet (500 mg total) by mouth 2 (two) times daily with meals. 180 tablet 1    metoprolol succinate (TOPROL-XL) 50 MG 24 hr tablet Take 1 tablet (50 mg total) by mouth once daily. 90 tablet 1    pantoprazole (PROTONIX) 40 MG tablet Take 40 mg by mouth 2 (two) times daily.      promethazine (PHENERGAN) 12.5 MG Tab Take 12.5 mg by mouth 4 (four) times daily as needed.      rivaroxaban (XARELTO) 15 mg Tab Take 1 tablet (15 mg " total) by mouth 2 (two) times daily with meals. for 21 days 42 tablet 0    sertraline (ZOLOFT) 100 MG tablet Take 1 tablet (100 mg total) by mouth once daily. (Patient taking differently: Take 100 mg by mouth once daily. 150 mg Total) 90 tablet 1    sertraline (ZOLOFT) 50 MG tablet Take 1 tablet (50 mg total) by mouth once daily. (Patient taking differently: Take 50 mg by mouth once daily. 150 mg Total) 90 tablet 1    simvastatin (ZOCOR) 20 MG tablet Take 1 tablet (20 mg total) by mouth every evening. (Patient taking differently: Take 20 mg by mouth once daily.) 90 tablet 1    zolpidem (AMBIEN) 10 mg Tab Take 1 tablet (10 mg total) by mouth nightly as needed (insomnia). 30 tablet 2    budesonide (ENTOCORT EC) 3 mg capsule Take 3 capsules (9 mg total) by mouth once daily. 90 capsule 0    ergocalciferol (VITAMIN D2) 50,000 unit Cap Take 1 capsule (50,000 Units total) by mouth every 7 days. (Patient taking differently: Take 50,000 Units by mouth every 7 days. FRIDAYS) 12 capsule 1    lipase-protease-amylase 12,000-38,000-60,000 units (CREON) CpDR Take 3 capsules by mouth 3 (three) times daily with meals. (Patient not taking: Reported on 9/3/2024) 270 capsule 11    rivaroxaban (XARELTO) 20 mg Tab Take 1 tablet (20 mg total) by mouth daily with dinner or evening meal. (Patient not taking: Reported on 9/3/2024) 90 tablet 1    semaglutide (OZEMPIC) 2 mg/dose (8 mg/3 mL) PnIj Inject 2 mg into the skin every 7 days. (Patient taking differently: Inject 2 mg into the skin every 7 days. WEDNESDAYS) 3 mL 5       Potential issues to be addressed PRIOR TO DISCHARGE  Patient reported not taking the following medications: (Creon). These medications remain on the home medication list. Please address accordingly.     Yu Holloway  EXT 1924

## 2024-09-03 NOTE — TELEPHONE ENCOUNTER
Spoke to patient regarding the referral for med management. Advised patient of the wait list, patient would like to be placed on the wait list. Offered resources pt declined. Ok for short notice.

## 2024-09-03 NOTE — ED PROVIDER NOTES
Encounter Date: 9/3/2024       History     Chief Complaint   Patient presents with    Chest Pain     ONSET 1 HR AGO    Shortness of Breath     ONSET 1 HR AGO     This is 49-year-old male with history of diabetes, hypertension, hyperlipidemia, DVT, prior history of ulcerative colitis as well as cavitary pneumonia comes in complaining of chest pain and shortness of breath.  Patient reports that symptoms started an hour ago.  He was at rest.  He denies any nausea or vomiting with this.  No dizziness.  He denies any weakness.  He denies any exacerbating or alleviating factors.  He did not take any medication.      Review of patient's allergies indicates:  No Known Allergies  Past Medical History:   Diagnosis Date    C. difficile colitis     Cavitary pneumonia 11/2023    pos aspergillus serology    Diabetes mellitus 01/2022    Hyperlipidemia 2015    Hypertension 2015    Insomnia 2015    Ulcerative colitis      Past Surgical History:   Procedure Laterality Date    BRONCHOSCOPY WITH FLUOROSCOPY Left 11/20/2023    Procedure: BRONCHOSCOPY, WITH FLUOROSCOPY;  Surgeon: Lisa Villarreal MD;  Location: [facility];  Service: Pulmonary;  Laterality: Left;    BRONCHOSCOPY WITH FLUOROSCOPY N/A 11/30/2023    Procedure: BRONCHOSCOPY, WITH FLUOROSCOPY;  Surgeon: Lisa Villarreal MD;  Location: [facility];  Service: Pulmonary;  Laterality: N/A;    CARDIAC ELECTROPHYSIOLOGY MAPPING AND ABLATION  2017    SVT    CHOLECYSTECTOMY  2011    COLONOSCOPY N/A 5/3/2022    Procedure: COLONOSCOPY;  Surgeon: Shan Jean III, MD;  Location: [facility];  Service: Endoscopy;  Laterality: N/A;    COLONOSCOPY N/A 3/16/2024    Procedure: COLONOSCOPY;  Surgeon: ALEKSANDER Ramos MD;  Location: [facility];  Service: Endoscopy;  Laterality: N/A;    COLONOSCOPY WITH FECAL MICROBIOTA TRANSFER N/A 3/21/2023    Procedure: COLONOSCOPY, WITH FECAL MICROBIOTA TRANSFER;  Surgeon: David Millan MD;  Location: [facility];  Service: Endoscopy;  Laterality:  N/A;    ESOPHAGOGASTRODUODENOSCOPY N/A 4/24/2023    Procedure: EGD (ESOPHAGOGASTRODUODENOSCOPY);  Surgeon: Shan Jean III, MD;  Location: [facility];  Service: Endoscopy;  Laterality: N/A;    ESOPHAGOGASTRODUODENOSCOPY N/A 6/5/2024    Procedure: EGD (ESOPHAGOGASTRODUODENOSCOPY);  Surgeon: Odalis Mccabe MD;  Location: [facility];  Service: Endoscopy;  Laterality: N/A;    FLEXIBLE SIGMOIDOSCOPY N/A 6/5/2024    Procedure: SIGMOIDOSCOPY, FLEXIBLE;  Surgeon: Odalis Mccabe MD;  Location: [facility];  Service: Endoscopy;  Laterality: N/A;    FLEXIBLE SIGMOIDOSCOPY N/A 7/16/2024    Procedure: SIGMOIDOSCOPY, FLEXIBLE;  Surgeon: Shan Jean III, MD;  Location: [facility];  Service: Endoscopy;  Laterality: N/A;    FLEXIBLE SIGMOIDOSCOPY N/A 8/13/2024    Procedure: SIGMOIDOSCOPY, FLEXIBLE;  Surgeon: Shan Jean III, MD;  Location: [facility];  Service: Endoscopy;  Laterality: N/A;    GANGLION CYST EXCISION Left 1992    UMBILICAL HERNIA REPAIR  2011    with mesh     Family History   Problem Relation Name Age of Onset    Asthma Mother       Social History     Tobacco Use    Smoking status: Former     Current packs/day: 1.00     Average packs/day: 1 pack/day for 31.7 years (31.7 ttl pk-yrs)     Types: Cigarettes     Start date: 1993    Smokeless tobacco: Never    Tobacco comments:     Pt states he has stopped smoking with Chantix three weeks ago. 12/1/23   Substance Use Topics    Alcohol use: Yes     Comment: ocass    Drug use: Not Currently     Review of Systems   Constitutional:  Negative for chills and fever.   HENT:  Negative for congestion, sore throat and trouble swallowing.    Respiratory:  Positive for shortness of breath. Negative for cough.    Cardiovascular:  Positive for chest pain. Negative for palpitations.   Gastrointestinal:  Negative for abdominal pain, diarrhea, nausea and vomiting.   Genitourinary:  Negative for dysuria and flank pain.   Musculoskeletal:  Negative for back pain  and neck pain.   Neurological:  Negative for weakness, numbness and headaches.   Psychiatric/Behavioral:  Negative for agitation and confusion.    All other systems reviewed and are negative.      Physical Exam     Initial Vitals [09/03/24 1054]   BP Pulse Resp Temp SpO2   (!) 142/97 88 18 97.3 °F (36.3 °C) 98 %      MAP       --         Physical Exam    Nursing note and vitals reviewed.  Constitutional: Vital signs are normal. He appears well-developed and well-nourished.  Non-toxic appearance. No distress.   HENT:   Head: Normocephalic and atraumatic.   Mouth/Throat: Oropharynx is clear and moist.   Eyes: EOM are normal. Pupils are equal, round, and reactive to light.   Neck: Neck supple.   Normal range of motion.  Cardiovascular:  Normal rate, regular rhythm and intact distal pulses.           Pulmonary/Chest: Breath sounds normal. He has no wheezes.   Abdominal: Abdomen is soft. Bowel sounds are normal. There is no abdominal tenderness.   Musculoskeletal:         General: No tenderness or edema. Normal range of motion.      Cervical back: Normal range of motion and neck supple. No rigidity. No muscular tenderness.     Lymphadenopathy:     He has no cervical adenopathy.     He has no axillary adenopathy.   Neurological: He is alert and oriented to person, place, and time. He has normal strength. No cranial nerve deficit or sensory deficit. Gait normal.   Skin: Skin is warm, dry and intact.   Psychiatric: He has a normal mood and affect. His behavior is normal.         ED Course   Procedures  Labs Reviewed   MAGNESIUM - Abnormal       Result Value    Magnesium 1.2 (*)    CBC W/ AUTO DIFFERENTIAL - Abnormal    WBC 7.18      RBC 3.84 (*)     Hemoglobin 12.0 (*)     Hematocrit 38.1 (*)     MCV 99 (*)     MCH 31.3 (*)     MCHC 31.5 (*)     RDW 16.6 (*)     Platelets 196      MPV 10.5      Immature Granulocytes 0.4      Gran # (ANC) 4.9      Immature Grans (Abs) 0.03      Lymph # 1.4      Mono # 0.6      Eos # 0.2       Baso # 0.02      nRBC 0      Gran % 68.8      Lymph % 18.8      Mono % 8.5      Eosinophil % 3.2      Basophil % 0.3      Differential Method Automated     COMPREHENSIVE METABOLIC PANEL - Abnormal    Sodium 141      Potassium 3.7      Chloride 104      CO2 27      Glucose 88      BUN 10      Creatinine 1.0      Calcium 8.9      Total Protein 6.7      Albumin 3.9      Total Bilirubin 0.4      Alkaline Phosphatase 49 (*)     AST 13      ALT 11      eGFR >60.0      Anion Gap 10     TROPONIN I HIGH SENSITIVITY    Troponin I High Sensitivity 3.5     TROPONIN I HIGH SENSITIVITY    Troponin I High Sensitivity 3.1     B-TYPE NATRIURETIC PEPTIDE    BNP 46       EKG Readings: (Independently Interpreted)   EKG was independently interpreted by me contemporaneously with patient care  Time: 10:54 a.m.  Rate: 92 beats per minute  Normal sinus rhythm  No ST or T-wave abnormality  Unchanged from prior   Other EKG Interpretations: Time: 0327  Rate: 78 bpm  Normal sinus rhythm  No ST or T wave abnormality  Unchanged from prior     ECG Results              Repeat EKG 12-lead (In process)        Collection Time Result Time QRS Duration OHS QTC Calculation    09/03/24 15:27:22 09/03/24 15:32:56 76 446                     In process by Interface, Lab In [facility] (09/03/24 15:33:01)                   Narrative:    Test Reason : R07.9,    Vent. Rate : 078 BPM     Atrial Rate : 078 BPM     P-R Int : 156 ms          QRS Dur : 076 ms      QT Int : 392 ms       P-R-T Axes : 048 024 030 degrees     QTc Int : 446 ms    Normal sinus rhythm  Normal ECG  When compared with ECG of 03-SEP-2024 10:54,  No significant change was found    Referred By: [email or web address]   SELF           Confirmed By:                                      EKG 12-lead (In process)        Collection Time Result Time QRS Duration OHS QTC Calculation    09/03/24 10:54:09 09/03/24 11:00:01 74 450                     In process by Interface, Lab In [facility] (09/03/24 11:00:07)                    Narrative:    Test Reason : R06.02,    Vent. Rate : 092 BPM     Atrial Rate : 092 BPM     P-R Int : 136 ms          QRS Dur : 074 ms      QT Int : 364 ms       P-R-T Axes : 043 023 029 degrees     QTc Int : 450 ms    Normal sinus rhythm  Normal ECG  When compared with ECG of 26-AUG-2024 15:06,  Previous ECG has undetermined rhythm, needs review    Referred By:             Confirmed By:                                   Imaging Results              X-Ray Chest AP Portable (Final result)  Result time 09/03/24 11:42:55      Final result by Charles Baker DO (09/03/24 11:42:55)                   Impression:      Borderline enlarged cardiomediastinal silhouette.      Electronically signed by: Charles Baker  Date:    09/03/2024  Time:    11:42               Narrative:    EXAMINATION:  XR CHEST AP PORTABLE    CLINICAL HISTORY:  Chest Pain;    FINDINGS:  Portable chest with comparison chest x-ray 08/26/2024.  Borderline enlarged cardiomediastinal silhouette noted.Lungs are clear. Pulmonary vasculature is normal. No acute osseous abnormality.                                       Medications   magnesium sulfate 2g in water 50mL IVPB (premix) (2 g Intravenous New Bag 9/3/24 3065)     Medical Decision Making  This is a 49-year-old male with history of diabetes, hypertension, hyperlipidemia, DVT, prior history of ulcerative colitis when completed in his breath exam and patient is hypertensive.  Vitals are stable otherwise.  Exam is normal.    Orders includes EKG, CBC, CMP, troponin, BNP and chest x-ray. He was given aspirin.    Comorbidities contributing to patient's presentation include history of hypertension, hyperlipidemia, diabetes, DVT, prior ulcerative colitis.  Acute exacerbation of chronic illness and hypertensive.    I reviewed patient's external medical notes.  He was recently evaluated in the ER for arm pain was diagnosed with a DVT.  CT angio showed no PE.  Patient's most recent stress test was in January  and was negative.    Differential diagnosis includes electrolyte abnormality, ACS, arrhythmia, PE.    Amount and/or Complexity of Data Reviewed  External Data Reviewed: labs, radiology, ECG and notes.     Details: As detailed above.  Labs: ordered.     Details: Labs were reviewed and were significant for a negative troponin.  Magnesium was 1.2 and was repleted.  Radiology: ordered and independent interpretation performed.     Details: Chest x-ray was independently interpreted by me and showed no infiltrate or effusion.  ECG/medicine tests: ordered and independent interpretation performed. Decision-making details documented in ED Course.    Risk  Prescription drug management.  Decision regarding hospitalization.  Risk Details: MDM continued:  Patient's workup is unremarkable.  His cardiac enzymes are negative x2.  His magnesium was low and was repleted.  His heart score is 4.  Patient had a negative nuclear stress test this year.  Repeat EKG shows no dynamic changes.  He will be discharged with close outpatient follow-up.  He is to return to the ER for any concerns.      Additional MDM:   Heart Score:    History:          Moderately suspicious.  ECG:             Normal  Age:               45-65 years  Risk factors: >= 3 risk factors or history of atherosclerotic disease  Troponin:       Less than or equal to normal limit  Heart Score = 4                                       Clinical Impression:  Final diagnoses:  [R07.9] Chest pain  [E83.42] Hypomagnesemia (Primary)          ED Disposition Condition    Discharge Stable          ED Prescriptions    None       Follow-up Information       Follow up With Specialties Details Why Contact Info    Hunter Aguilar III, MD Family Medicine In 2 days  [address]  [address]  [place] [address]  [phone]               Alana Zaidi MD  09/03/24 0509

## 2024-09-04 ENCOUNTER — PATIENT MESSAGE (OUTPATIENT)
Dept: FAMILY MEDICINE | Facility: CLINIC | Age: 49
End: 2024-09-04
Payer: COMMERCIAL

## 2024-09-04 ENCOUNTER — OFFICE VISIT (OUTPATIENT)
Dept: CARDIOLOGY | Facility: CLINIC | Age: 49
End: 2024-09-04
Payer: COMMERCIAL

## 2024-09-04 ENCOUNTER — LAB VISIT (OUTPATIENT)
Dept: LAB | Facility: HOSPITAL | Age: 49
End: 2024-09-04
Payer: COMMERCIAL

## 2024-09-04 ENCOUNTER — OFFICE VISIT (OUTPATIENT)
Dept: WOUND CARE | Facility: HOSPITAL | Age: 49
End: 2024-09-04
Attending: FAMILY MEDICINE
Payer: COMMERCIAL

## 2024-09-04 VITALS — HEART RATE: 70 BPM | RESPIRATION RATE: 18 BRPM | TEMPERATURE: 98 F

## 2024-09-04 VITALS
RESPIRATION RATE: 16 BRPM | HEIGHT: 65 IN | HEART RATE: 88 BPM | BODY MASS INDEX: 45.15 KG/M2 | SYSTOLIC BLOOD PRESSURE: 142 MMHG | DIASTOLIC BLOOD PRESSURE: 90 MMHG | WEIGHT: 271 LBS | OXYGEN SATURATION: 98 %

## 2024-09-04 DIAGNOSIS — L02.818 CUTANEOUS ABSCESS OF OTHER SITE: Primary | ICD-10-CM

## 2024-09-04 DIAGNOSIS — S41.102D OPEN WOUND OF LEFT UPPER ARM, SUBSEQUENT ENCOUNTER: ICD-10-CM

## 2024-09-04 DIAGNOSIS — I82.A11 ACUTE DEEP VEIN THROMBOSIS (DVT) OF AXILLARY VEIN OF RIGHT UPPER EXTREMITY: ICD-10-CM

## 2024-09-04 DIAGNOSIS — E83.42 HYPOMAGNESEMIA: ICD-10-CM

## 2024-09-04 DIAGNOSIS — Z86.79 HISTORY OF SUPRAVENTRICULAR TACHYCARDIA: ICD-10-CM

## 2024-09-04 DIAGNOSIS — R07.9 CHEST PAIN, UNSPECIFIED TYPE: Primary | ICD-10-CM

## 2024-09-04 DIAGNOSIS — R69 TAKING MEDICATION FOR CHRONIC DISEASE: ICD-10-CM

## 2024-09-04 DIAGNOSIS — R07.9 CHEST PAIN, UNSPECIFIED TYPE: ICD-10-CM

## 2024-09-04 DIAGNOSIS — E66.01 MORBID OBESITY: ICD-10-CM

## 2024-09-04 DIAGNOSIS — S41.102A ARM WOUND, LEFT, INITIAL ENCOUNTER: Primary | ICD-10-CM

## 2024-09-04 DIAGNOSIS — F41.9 ANXIETY: ICD-10-CM

## 2024-09-04 DIAGNOSIS — R07.89 OTHER CHEST PAIN: ICD-10-CM

## 2024-09-04 LAB
BASOPHILS # BLD AUTO: 0.04 K/UL (ref 0–0.2)
BASOPHILS NFR BLD: 0.5 % (ref 0–1.9)
CRP SERPL-MCNC: >1 MG/DL
DIFFERENTIAL METHOD BLD: ABNORMAL
EOSINOPHIL # BLD AUTO: 0.3 K/UL (ref 0–0.5)
EOSINOPHIL NFR BLD: 3.2 % (ref 0–8)
ERYTHROCYTE [DISTWIDTH] IN BLOOD BY AUTOMATED COUNT: 16.3 % (ref 11.5–14.5)
ERYTHROCYTE [SEDIMENTATION RATE] IN BLOOD BY WESTERGREN METHOD: 11 MM/HR (ref 0–10)
HCT VFR BLD AUTO: 38.2 % (ref 40–54)
HGB BLD-MCNC: 12 G/DL (ref 14–18)
IMM GRANULOCYTES # BLD AUTO: 0.02 K/UL (ref 0–0.04)
IMM GRANULOCYTES NFR BLD AUTO: 0.2 % (ref 0–0.5)
LYMPHOCYTES # BLD AUTO: 1.7 K/UL (ref 1–4.8)
LYMPHOCYTES NFR BLD: 20.9 % (ref 18–48)
MAGNESIUM SERPL-MCNC: 1.5 MG/DL (ref 1.6–2.6)
MCH RBC QN AUTO: 31.5 PG (ref 27–31)
MCHC RBC AUTO-ENTMCNC: 31.4 G/DL (ref 32–36)
MCV RBC AUTO: 100 FL (ref 82–98)
MONOCYTES # BLD AUTO: 0.8 K/UL (ref 0.3–1)
MONOCYTES NFR BLD: 9.8 % (ref 4–15)
NEUTROPHILS # BLD AUTO: 5.3 K/UL (ref 1.8–7.7)
NEUTROPHILS NFR BLD: 65.4 % (ref 38–73)
NRBC BLD-RTO: 0 /100 WBC
PLATELET # BLD AUTO: 201 K/UL (ref 150–450)
PMV BLD AUTO: 10.6 FL (ref 9.2–12.9)
RBC # BLD AUTO: 3.81 M/UL (ref 4.6–6.2)
WBC # BLD AUTO: 8.17 K/UL (ref 3.9–12.7)

## 2024-09-04 PROCEDURE — 3008F BODY MASS INDEX DOCD: CPT | Mod: CPTII,S$GLB,,

## 2024-09-04 PROCEDURE — 1111F DSCHRG MED/CURRENT MED MERGE: CPT | Mod: CPTII,S$GLB,,

## 2024-09-04 PROCEDURE — 99499 UNLISTED E&M SERVICE: CPT | Mod: ,,, | Performed by: FAMILY MEDICINE

## 2024-09-04 PROCEDURE — 3044F HG A1C LEVEL LT 7.0%: CPT | Mod: CPTII,S$GLB,,

## 2024-09-04 PROCEDURE — 3077F SYST BP >= 140 MM HG: CPT | Mod: CPTII,S$GLB,,

## 2024-09-04 PROCEDURE — 83735 ASSAY OF MAGNESIUM: CPT

## 2024-09-04 PROCEDURE — 3080F DIAST BP >= 90 MM HG: CPT | Mod: CPTII,S$GLB,,

## 2024-09-04 PROCEDURE — 85651 RBC SED RATE NONAUTOMATED: CPT

## 2024-09-04 PROCEDURE — 11042 DBRDMT SUBQ TIS 1ST 20SQCM/<: CPT | Mod: PN | Performed by: FAMILY MEDICINE

## 2024-09-04 PROCEDURE — 85025 COMPLETE CBC W/AUTO DIFF WBC: CPT | Performed by: INTERNAL MEDICINE

## 2024-09-04 PROCEDURE — 99999 PR PBB SHADOW E&M-EST. PATIENT-LVL V: CPT | Mod: PBBFAC,,,

## 2024-09-04 PROCEDURE — 11042 DBRDMT SUBQ TIS 1ST 20SQCM/<: CPT | Mod: ,,, | Performed by: FAMILY MEDICINE

## 2024-09-04 PROCEDURE — 99214 OFFICE O/P EST MOD 30 MIN: CPT | Mod: S$GLB,,,

## 2024-09-04 PROCEDURE — 36415 COLL VENOUS BLD VENIPUNCTURE: CPT

## 2024-09-04 PROCEDURE — 1159F MED LIST DOCD IN RCRD: CPT | Mod: CPTII,S$GLB,,

## 2024-09-04 PROCEDURE — 3072F LOW RISK FOR RETINOPATHY: CPT | Mod: CPTII,S$GLB,,

## 2024-09-04 PROCEDURE — 86141 C-REACTIVE PROTEIN HS: CPT

## 2024-09-04 RX ORDER — COLCHICINE 0.6 MG/1
0.6 TABLET ORAL 2 TIMES DAILY
Qty: 180 TABLET | Refills: 0 | Status: SHIPPED | OUTPATIENT
Start: 2024-09-04 | End: 2024-12-03

## 2024-09-04 NOTE — ASSESSMENT & PLAN NOTE
Urgent referral placed for psychiatry per patient request.  He states that he has been calling around the soon and the soonest he is able to be seen in 3 months and states that is too long.  Currently taking Ativan 3 times a day, BuSpar 3 times a day and Zoloft daily.  States anxiety is not well controlled at this time.  Defer.

## 2024-09-04 NOTE — TELEPHONE ENCOUNTER
Wound on shoulder want pain med , is [facility] allowed to give it?      Also was given xarelto for 21 days at hosp. Been to [facility] since. Need a refill. Ok to send [facility] the refill request or was he suppose to d/c after 21 days (directions on script say 21 days)      ----- Message from Jonelle Nava sent at 9/4/2024  8:48 AM CDT -----  Contact: [facility]  Type: Needs Medical Advice    Who Called: [facility]     Best Call Back Number: [phone]     Additional Information: Requesting a call back regarding they are following up on the refill for rivaroxaban (XARELTO) 15 mg Tab.  Pt is about out and they mail it to pt. [facility] but it for only 2 week supply.     [facility] [place] [address]  [address] [facility] [address]  Phone: [phone] Fax: [phone]      Please Advise- Thank you

## 2024-09-04 NOTE — ASSESSMENT & PLAN NOTE
Mag 1.2 in ER yesterday.  Redraw this morning.  He did receive IV magnesium at that time.  Continue Mag oxide 400 mg daily at this time.

## 2024-09-04 NOTE — ASSESSMENT & PLAN NOTE
Body mass index is 45.1 kg/m². Morbid obesity complicates all aspects of disease management from diagnostic modalities to treatment. Weight loss encouraged and health benefits explained to patient.

## 2024-09-04 NOTE — ASSESSMENT & PLAN NOTE
Cardiac workup otherwise negative thus far.  Patient is still complaining of chest pain 6/10. + rubs noted.  No acute EKG changes.  Troponins in ER yesterday negative x2.  Draw sed rate and CRP.  Start colchicine 0.6 mg b.i.d. x3 months.

## 2024-09-04 NOTE — PROGRESS NOTES
"          Wound Care Progress Note    Subjective:       Patient ID: Jacoby Jean-Baptiste is a 49 y.o. male.    Chief Complaint: Wound Care    HPI  Pt seen in clinic for a FU visit for left arm surgical wound. Wound is stable, some improvement, no other complaints today  Review of Systems   Skin:  Positive for wound.        Left arm open wound   All other systems reviewed and are negative.      Objective:        Physical Exam  Vitals and nursing note reviewed.   Constitutional:       General: He is not in acute distress.     Appearance: Normal appearance.   Skin:     General: Skin is warm and dry.      Findings: Lesion present.      Comments: Left arm open surgical wound, see wound care assessment documentation in chart review scanned under the media tab   Neurological:      General: No focal deficit present.      Mental Status: He is alert.   Psychiatric:         Mood and Affect: Mood normal.         Judgment: Judgment normal.       Vitals:    09/04/24 1018   Pulse: 70   Resp: 18   Temp: 98.2 °F (36.8 °C)       Assessment:           ICD-10-CM ICD-9-CM   1. Cutaneous abscess of other site  L02.818 682.8   2. Open wound of left upper arm, subsequent encounter  S41.102D V58.89     880.03                Plan:                  Jacoby Waters" was seen today for wound care.    Diagnoses and all orders for this visit:    Cutaneous abscess of other site    Open wound of left upper arm, subsequent encounter        Much of the documentation for this visit was completed in the [facility] system.  Please see the attached documentation for further details about the patient's care. Scanned under the Media tab.          "

## 2024-09-07 ENCOUNTER — DOCUMENT SCAN (OUTPATIENT)
Dept: HOME HEALTH SERVICES | Facility: HOSPITAL | Age: 49
End: 2024-09-07
Payer: COMMERCIAL

## 2024-09-08 ENCOUNTER — EXTERNAL HOME HEALTH (OUTPATIENT)
Dept: HOME HEALTH SERVICES | Facility: HOSPITAL | Age: 49
End: 2024-09-08
Payer: COMMERCIAL

## 2024-09-10 ENCOUNTER — HOSPITAL ENCOUNTER (INPATIENT)
Facility: HOSPITAL | Age: 49
LOS: 3 days | Discharge: HOME HEALTH | DRG: 386 | End: 2024-09-13
Attending: EMERGENCY MEDICINE | Admitting: HOSPITALIST
Payer: COMMERCIAL

## 2024-09-10 ENCOUNTER — DOCUMENT SCAN (OUTPATIENT)
Dept: HOME HEALTH SERVICES | Facility: HOSPITAL | Age: 49
End: 2024-09-10
Payer: COMMERCIAL

## 2024-09-10 DIAGNOSIS — L08.9 WOUND INFECTION: ICD-10-CM

## 2024-09-10 DIAGNOSIS — E11.9 TYPE 2 DIABETES MELLITUS WITHOUT COMPLICATION, WITHOUT LONG-TERM CURRENT USE OF INSULIN: ICD-10-CM

## 2024-09-10 DIAGNOSIS — T14.8XXA WOUND INFECTION: ICD-10-CM

## 2024-09-10 DIAGNOSIS — K51.311 ULCERATIVE RECTOSIGMOIDITIS WITH RECTAL BLEEDING: Primary | ICD-10-CM

## 2024-09-10 DIAGNOSIS — I82.A11 ACUTE DEEP VEIN THROMBOSIS (DVT) OF AXILLARY VEIN OF RIGHT UPPER EXTREMITY: ICD-10-CM

## 2024-09-10 DIAGNOSIS — R07.9 CHEST PAIN: ICD-10-CM

## 2024-09-10 LAB
ALBUMIN SERPL BCP-MCNC: 3.6 G/DL (ref 3.5–5.2)
ALP SERPL-CCNC: 52 U/L (ref 55–135)
ALT SERPL W/O P-5'-P-CCNC: 14 U/L (ref 10–44)
AMPHET+METHAMPHET UR QL: NEGATIVE
ANION GAP SERPL CALC-SCNC: 11 MMOL/L (ref 8–16)
AST SERPL-CCNC: 26 U/L (ref 10–40)
BARBITURATES UR QL SCN>200 NG/ML: NEGATIVE
BASOPHILS # BLD AUTO: 0.05 K/UL (ref 0–0.2)
BASOPHILS NFR BLD: 0.6 % (ref 0–1.9)
BENZODIAZ UR QL SCN>200 NG/ML: NEGATIVE
BILIRUB SERPL-MCNC: 0.4 MG/DL (ref 0.1–1)
BILIRUB UR QL STRIP: NEGATIVE
BILIRUB UR QL STRIP: NEGATIVE
BUN SERPL-MCNC: 10 MG/DL (ref 6–20)
BZE UR QL SCN: NEGATIVE
CALCIUM SERPL-MCNC: 8.8 MG/DL (ref 8.7–10.5)
CANNABINOIDS UR QL SCN: NEGATIVE
CHLORIDE SERPL-SCNC: 104 MMOL/L (ref 95–110)
CK SERPL-CCNC: 260 U/L (ref 20–200)
CLARITY UR: CLEAR
CLARITY UR: CLEAR
CO2 SERPL-SCNC: 21 MMOL/L (ref 23–29)
COLOR UR: YELLOW
COLOR UR: YELLOW
CREAT SERPL-MCNC: 0.9 MG/DL (ref 0.5–1.4)
CREAT UR-MCNC: 77.9 MG/DL (ref 23–375)
DIFFERENTIAL METHOD BLD: ABNORMAL
EOSINOPHIL # BLD AUTO: 0.3 K/UL (ref 0–0.5)
EOSINOPHIL NFR BLD: 3 % (ref 0–8)
ERYTHROCYTE [DISTWIDTH] IN BLOOD BY AUTOMATED COUNT: 15.7 % (ref 11.5–14.5)
EST. GFR  (NO RACE VARIABLE): >60 ML/MIN/1.73 M^2
GLUCOSE SERPL-MCNC: 77 MG/DL (ref 70–110)
GLUCOSE UR QL STRIP: NEGATIVE
GLUCOSE UR QL STRIP: NEGATIVE
HCT VFR BLD AUTO: 35.7 % (ref 40–54)
HGB BLD-MCNC: 11.7 G/DL (ref 14–18)
HGB UR QL STRIP: NORMAL
HGB UR QL STRIP: NORMAL
IMM GRANULOCYTES # BLD AUTO: 0.06 K/UL (ref 0–0.04)
IMM GRANULOCYTES NFR BLD AUTO: 0.7 % (ref 0–0.5)
KETONES UR QL STRIP: NEGATIVE
KETONES UR QL STRIP: NEGATIVE
LDH SERPL L TO P-CCNC: 0.54 MMOL/L (ref 0.5–2.2)
LEUKOCYTE ESTERASE UR QL STRIP: NEGATIVE
LEUKOCYTE ESTERASE UR QL STRIP: NEGATIVE
LIPASE SERPL-CCNC: 3 U/L (ref 4–60)
LYMPHOCYTES # BLD AUTO: 2.1 K/UL (ref 1–4.8)
LYMPHOCYTES NFR BLD: 23.9 % (ref 18–48)
MAGNESIUM SERPL-MCNC: 1.3 MG/DL (ref 1.6–2.6)
MCH RBC QN AUTO: 31.5 PG (ref 27–31)
MCHC RBC AUTO-ENTMCNC: 32.8 G/DL (ref 32–36)
MCV RBC AUTO: 96 FL (ref 82–98)
MONOCYTES # BLD AUTO: 0.9 K/UL (ref 0.3–1)
MONOCYTES NFR BLD: 10.9 % (ref 4–15)
NEUTROPHILS # BLD AUTO: 5.2 K/UL (ref 1.8–7.7)
NEUTROPHILS NFR BLD: 60.9 % (ref 38–73)
NITRITE UR QL STRIP: NEGATIVE
NITRITE UR QL STRIP: NEGATIVE
NRBC BLD-RTO: 0 /100 WBC
OB PNL STL: POSITIVE
OPIATES UR QL SCN: NEGATIVE
PCP UR QL SCN>25 NG/ML: NEGATIVE
PH UR STRIP: 6 [PH] (ref 5–8)
PH UR STRIP: 6 [PH] (ref 5–8)
PLATELET # BLD AUTO: 247 K/UL (ref 150–450)
PMV BLD AUTO: 11.1 FL (ref 9.2–12.9)
POCT GLUCOSE: 85 MG/DL (ref 70–110)
POTASSIUM SERPL-SCNC: 4.6 MMOL/L (ref 3.5–5.1)
PROT SERPL-MCNC: 6.5 G/DL (ref 6–8.4)
PROT UR QL STRIP: NEGATIVE
PROT UR QL STRIP: NEGATIVE
RBC # BLD AUTO: 3.71 M/UL (ref 4.6–6.2)
SAMPLE: NORMAL
SODIUM SERPL-SCNC: 136 MMOL/L (ref 136–145)
SP GR UR STRIP: 1.01 (ref 1–1.03)
SP GR UR STRIP: 1.01 (ref 1–1.03)
TOXICOLOGY INFORMATION: NORMAL
TSH SERPL DL<=0.005 MIU/L-ACNC: 0.36 UIU/ML (ref 0.34–5.6)
URN SPEC COLLECT METH UR: NORMAL
URN SPEC COLLECT METH UR: NORMAL
UROBILINOGEN UR STRIP-ACNC: NEGATIVE EU/DL
UROBILINOGEN UR STRIP-ACNC: NEGATIVE EU/DL
WBC # BLD AUTO: 8.59 K/UL (ref 3.9–12.7)
WBC #/AREA STL HPF: NORMAL /[HPF]

## 2024-09-10 PROCEDURE — 27000207 HC ISOLATION

## 2024-09-10 PROCEDURE — 25000003 PHARM REV CODE 250: Performed by: EMERGENCY MEDICINE

## 2024-09-10 PROCEDURE — 83690 ASSAY OF LIPASE: CPT | Performed by: EMERGENCY MEDICINE

## 2024-09-10 PROCEDURE — 25000003 PHARM REV CODE 250: Performed by: NURSE PRACTITIONER

## 2024-09-10 PROCEDURE — 80053 COMPREHEN METABOLIC PANEL: CPT | Performed by: EMERGENCY MEDICINE

## 2024-09-10 PROCEDURE — 12000002 HC ACUTE/MED SURGE SEMI-PRIVATE ROOM

## 2024-09-10 PROCEDURE — 87046 STOOL CULTR AEROBIC BACT EA: CPT | Mod: 59 | Performed by: EMERGENCY MEDICINE

## 2024-09-10 PROCEDURE — 96375 TX/PRO/DX INJ NEW DRUG ADDON: CPT

## 2024-09-10 PROCEDURE — 83735 ASSAY OF MAGNESIUM: CPT | Performed by: EMERGENCY MEDICINE

## 2024-09-10 PROCEDURE — 87070 CULTURE OTHR SPECIMN AEROBIC: CPT | Performed by: HOSPITALIST

## 2024-09-10 PROCEDURE — 82272 OCCULT BLD FECES 1-3 TESTS: CPT | Performed by: EMERGENCY MEDICINE

## 2024-09-10 PROCEDURE — 87209 SMEAR COMPLEX STAIN: CPT | Performed by: EMERGENCY MEDICINE

## 2024-09-10 PROCEDURE — 82550 ASSAY OF CK (CPK): CPT | Performed by: EMERGENCY MEDICINE

## 2024-09-10 PROCEDURE — 80307 DRUG TEST PRSMV CHEM ANLYZR: CPT | Performed by: EMERGENCY MEDICINE

## 2024-09-10 PROCEDURE — 25500020 PHARM REV CODE 255: Performed by: EMERGENCY MEDICINE

## 2024-09-10 PROCEDURE — 87040 BLOOD CULTURE FOR BACTERIA: CPT | Mod: 59 | Performed by: EMERGENCY MEDICINE

## 2024-09-10 PROCEDURE — 63600175 PHARM REV CODE 636 W HCPCS: Performed by: EMERGENCY MEDICINE

## 2024-09-10 PROCEDURE — 89055 LEUKOCYTE ASSESSMENT FECAL: CPT | Performed by: EMERGENCY MEDICINE

## 2024-09-10 PROCEDURE — 87045 FECES CULTURE AEROBIC BACT: CPT | Performed by: EMERGENCY MEDICINE

## 2024-09-10 PROCEDURE — 96361 HYDRATE IV INFUSION ADD-ON: CPT

## 2024-09-10 PROCEDURE — 85025 COMPLETE CBC W/AUTO DIFF WBC: CPT | Performed by: EMERGENCY MEDICINE

## 2024-09-10 PROCEDURE — 81003 URINALYSIS AUTO W/O SCOPE: CPT | Performed by: EMERGENCY MEDICINE

## 2024-09-10 PROCEDURE — 96365 THER/PROPH/DIAG IV INF INIT: CPT

## 2024-09-10 PROCEDURE — 84443 ASSAY THYROID STIM HORMONE: CPT | Performed by: EMERGENCY MEDICINE

## 2024-09-10 PROCEDURE — 87075 CULTR BACTERIA EXCEPT BLOOD: CPT | Performed by: HOSPITALIST

## 2024-09-10 PROCEDURE — 99285 EMERGENCY DEPT VISIT HI MDM: CPT | Mod: 25

## 2024-09-10 PROCEDURE — 87449 NOS EACH ORGANISM AG IA: CPT | Performed by: EMERGENCY MEDICINE

## 2024-09-10 RX ORDER — IBUPROFEN 200 MG
16 TABLET ORAL
Status: DISCONTINUED | OUTPATIENT
Start: 2024-09-10 | End: 2024-09-13 | Stop reason: HOSPADM

## 2024-09-10 RX ORDER — ACETAMINOPHEN 325 MG/1
650 TABLET ORAL EVERY 8 HOURS PRN
Status: DISCONTINUED | OUTPATIENT
Start: 2024-09-10 | End: 2024-09-13 | Stop reason: HOSPADM

## 2024-09-10 RX ORDER — ZOLPIDEM TARTRATE 5 MG/1
10 TABLET ORAL NIGHTLY PRN
Status: DISCONTINUED | OUTPATIENT
Start: 2024-09-10 | End: 2024-09-13 | Stop reason: HOSPADM

## 2024-09-10 RX ORDER — ATORVASTATIN CALCIUM 10 MG/1
10 TABLET, FILM COATED ORAL DAILY
Status: DISCONTINUED | OUTPATIENT
Start: 2024-09-10 | End: 2024-09-13 | Stop reason: HOSPADM

## 2024-09-10 RX ORDER — COLCHICINE 0.6 MG/1
0.6 TABLET, FILM COATED ORAL 2 TIMES DAILY
Status: DISCONTINUED | OUTPATIENT
Start: 2024-09-10 | End: 2024-09-13 | Stop reason: HOSPADM

## 2024-09-10 RX ORDER — IBUPROFEN 200 MG
24 TABLET ORAL
Status: DISCONTINUED | OUTPATIENT
Start: 2024-09-10 | End: 2024-09-13 | Stop reason: HOSPADM

## 2024-09-10 RX ORDER — HYDROCODONE BITARTRATE AND ACETAMINOPHEN 5; 325 MG/1; MG/1
1 TABLET ORAL EVERY 6 HOURS PRN
Status: DISCONTINUED | OUTPATIENT
Start: 2024-09-10 | End: 2024-09-11

## 2024-09-10 RX ORDER — SODIUM CHLORIDE 0.9 % (FLUSH) 0.9 %
2 SYRINGE (ML) INJECTION
Status: DISCONTINUED | OUTPATIENT
Start: 2024-09-10 | End: 2024-09-13 | Stop reason: HOSPADM

## 2024-09-10 RX ORDER — TALC
6 POWDER (GRAM) TOPICAL NIGHTLY PRN
Status: DISCONTINUED | OUTPATIENT
Start: 2024-09-10 | End: 2024-09-13 | Stop reason: HOSPADM

## 2024-09-10 RX ORDER — SERTRALINE HYDROCHLORIDE 50 MG/1
50 TABLET, FILM COATED ORAL DAILY
Status: DISCONTINUED | OUTPATIENT
Start: 2024-09-11 | End: 2024-09-13 | Stop reason: HOSPADM

## 2024-09-10 RX ORDER — METOPROLOL SUCCINATE 50 MG/1
50 TABLET, EXTENDED RELEASE ORAL DAILY
Status: DISCONTINUED | OUTPATIENT
Start: 2024-09-11 | End: 2024-09-13 | Stop reason: HOSPADM

## 2024-09-10 RX ORDER — LORAZEPAM 0.5 MG/1
0.5 TABLET ORAL EVERY 6 HOURS PRN
Status: DISCONTINUED | OUTPATIENT
Start: 2024-09-10 | End: 2024-09-13 | Stop reason: HOSPADM

## 2024-09-10 RX ORDER — PANTOPRAZOLE SODIUM 40 MG/1
40 TABLET, DELAYED RELEASE ORAL 2 TIMES DAILY
Status: DISCONTINUED | OUTPATIENT
Start: 2024-09-10 | End: 2024-09-13 | Stop reason: HOSPADM

## 2024-09-10 RX ORDER — ONDANSETRON HYDROCHLORIDE 2 MG/ML
4 INJECTION, SOLUTION INTRAVENOUS EVERY 6 HOURS PRN
Status: DISCONTINUED | OUTPATIENT
Start: 2024-09-10 | End: 2024-09-13 | Stop reason: HOSPADM

## 2024-09-10 RX ORDER — ACETAMINOPHEN 325 MG/1
650 TABLET ORAL EVERY 4 HOURS PRN
Status: DISCONTINUED | OUTPATIENT
Start: 2024-09-10 | End: 2024-09-13 | Stop reason: HOSPADM

## 2024-09-10 RX ORDER — LANOLIN ALCOHOL/MO/W.PET/CERES
800 CREAM (GRAM) TOPICAL
Status: DISCONTINUED | OUTPATIENT
Start: 2024-09-10 | End: 2024-09-13 | Stop reason: HOSPADM

## 2024-09-10 RX ORDER — HYOSCYAMINE SULFATE 0.12 MG/1
0.38 TABLET SUBLINGUAL EVERY 12 HOURS
Status: DISCONTINUED | OUTPATIENT
Start: 2024-09-10 | End: 2024-09-13 | Stop reason: HOSPADM

## 2024-09-10 RX ORDER — CHOLESTYRAMINE 4 G/4.8G
1 POWDER, FOR SUSPENSION ORAL 2 TIMES DAILY
Status: DISCONTINUED | OUTPATIENT
Start: 2024-09-10 | End: 2024-09-13 | Stop reason: HOSPADM

## 2024-09-10 RX ORDER — MORPHINE SULFATE 2 MG/ML
2 INJECTION, SOLUTION INTRAMUSCULAR; INTRAVENOUS
Status: COMPLETED | OUTPATIENT
Start: 2024-09-10 | End: 2024-09-10

## 2024-09-10 RX ORDER — NALOXONE HCL 0.4 MG/ML
0.02 VIAL (ML) INJECTION
Status: DISCONTINUED | OUTPATIENT
Start: 2024-09-10 | End: 2024-09-13 | Stop reason: HOSPADM

## 2024-09-10 RX ORDER — ONDANSETRON HYDROCHLORIDE 2 MG/ML
4 INJECTION, SOLUTION INTRAVENOUS
Status: COMPLETED | OUTPATIENT
Start: 2024-09-10 | End: 2024-09-10

## 2024-09-10 RX ORDER — SODIUM,POTASSIUM PHOSPHATES 280-250MG
2 POWDER IN PACKET (EA) ORAL
Status: DISCONTINUED | OUTPATIENT
Start: 2024-09-10 | End: 2024-09-13 | Stop reason: HOSPADM

## 2024-09-10 RX ORDER — ALUMINUM HYDROXIDE, MAGNESIUM HYDROXIDE, AND SIMETHICONE 1200; 120; 1200 MG/30ML; MG/30ML; MG/30ML
30 SUSPENSION ORAL 4 TIMES DAILY PRN
Status: DISCONTINUED | OUTPATIENT
Start: 2024-09-10 | End: 2024-09-13 | Stop reason: HOSPADM

## 2024-09-10 RX ORDER — INSULIN ASPART 100 [IU]/ML
0-10 INJECTION, SOLUTION INTRAVENOUS; SUBCUTANEOUS
Status: DISCONTINUED | OUTPATIENT
Start: 2024-09-10 | End: 2024-09-13 | Stop reason: HOSPADM

## 2024-09-10 RX ORDER — METRONIDAZOLE 500 MG/100ML
500 INJECTION, SOLUTION INTRAVENOUS
Status: DISCONTINUED | OUTPATIENT
Start: 2024-09-11 | End: 2024-09-10

## 2024-09-10 RX ORDER — SERTRALINE HYDROCHLORIDE 50 MG/1
100 TABLET, FILM COATED ORAL DAILY
Status: DISCONTINUED | OUTPATIENT
Start: 2024-09-11 | End: 2024-09-13 | Stop reason: HOSPADM

## 2024-09-10 RX ORDER — MAGNESIUM SULFATE HEPTAHYDRATE 40 MG/ML
2 INJECTION, SOLUTION INTRAVENOUS ONCE
Status: COMPLETED | OUTPATIENT
Start: 2024-09-10 | End: 2024-09-11

## 2024-09-10 RX ORDER — GLUCAGON 1 MG
1 KIT INJECTION
Status: DISCONTINUED | OUTPATIENT
Start: 2024-09-10 | End: 2024-09-13 | Stop reason: HOSPADM

## 2024-09-10 RX ADMIN — IOHEXOL 100 ML: 350 INJECTION, SOLUTION INTRAVENOUS at 04:09

## 2024-09-10 RX ADMIN — HYDROCODONE BITARTRATE AND ACETAMINOPHEN 1 TABLET: 5; 325 TABLET ORAL at 09:09

## 2024-09-10 RX ADMIN — PIPERACILLIN SODIUM AND TAZOBACTAM SODIUM 4.5 G: 4; .5 INJECTION, POWDER, LYOPHILIZED, FOR SOLUTION INTRAVENOUS at 08:09

## 2024-09-10 RX ADMIN — ZOLPIDEM TARTRATE 10 MG: 5 TABLET, COATED ORAL at 10:09

## 2024-09-10 RX ADMIN — MAGNESIUM SULFATE HEPTAHYDRATE 2 G: 2 INJECTION, SOLUTION INTRAVENOUS at 10:09

## 2024-09-10 RX ADMIN — MORPHINE SULFATE 2 MG: 2 INJECTION, SOLUTION INTRAMUSCULAR; INTRAVENOUS at 04:09

## 2024-09-10 RX ADMIN — ATORVASTATIN CALCIUM 10 MG: 10 TABLET, FILM COATED ORAL at 10:09

## 2024-09-10 RX ADMIN — SODIUM CHLORIDE 1000 ML: 9 INJECTION, SOLUTION INTRAVENOUS at 03:09

## 2024-09-10 RX ADMIN — ONDANSETRON 4 MG: 2 INJECTION INTRAMUSCULAR; INTRAVENOUS at 04:09

## 2024-09-10 RX ADMIN — COLCHICINE 0.6 MG: 0.6 TABLET, FILM COATED ORAL at 10:09

## 2024-09-10 RX ADMIN — PANTOPRAZOLE SODIUM 40 MG: 40 TABLET, DELAYED RELEASE ORAL at 10:09

## 2024-09-11 ENCOUNTER — PATIENT MESSAGE (OUTPATIENT)
Dept: CARDIOLOGY | Facility: CLINIC | Age: 49
End: 2024-09-11
Payer: COMMERCIAL

## 2024-09-11 PROBLEM — Z76.5 DRUG-SEEKING BEHAVIOR: Status: ACTIVE | Noted: 2024-09-11

## 2024-09-11 LAB
ALBUMIN SERPL BCP-MCNC: 3.8 G/DL (ref 3.5–5.2)
ALP SERPL-CCNC: 60 U/L (ref 55–135)
ALT SERPL W/O P-5'-P-CCNC: 14 U/L (ref 10–44)
ANION GAP SERPL CALC-SCNC: 8 MMOL/L (ref 8–16)
AST SERPL-CCNC: 24 U/L (ref 10–40)
ASTROVIRUS: NOT DETECTED
BASOPHILS # BLD AUTO: 0.04 K/UL (ref 0–0.2)
BASOPHILS NFR BLD: 0.6 % (ref 0–1.9)
BILIRUB SERPL-MCNC: 0.5 MG/DL (ref 0.1–1)
BUN SERPL-MCNC: 8 MG/DL (ref 6–20)
C COLI+JEJ+UPSA DNA STL QL NAA+NON-PROBE: NOT DETECTED
C DIFF GDH STL QL: NEGATIVE
C DIFF TOX A+B STL QL IA: NEGATIVE
CALCIUM SERPL-MCNC: 8.9 MG/DL (ref 8.7–10.5)
CHLORIDE SERPL-SCNC: 101 MMOL/L (ref 95–110)
CO2 SERPL-SCNC: 26 MMOL/L (ref 23–29)
CREAT SERPL-MCNC: 0.9 MG/DL (ref 0.5–1.4)
CYCLOSPORA CAYETANENSIS: NOT DETECTED
DIFFERENTIAL METHOD BLD: ABNORMAL
ENTEROAGGREGATIVE E COLI: NOT DETECTED
ENTEROPATHOGENIC E COLI: NOT DETECTED
EOSINOPHIL # BLD AUTO: 0.3 K/UL (ref 0–0.5)
EOSINOPHIL NFR BLD: 3.4 % (ref 0–8)
ERYTHROCYTE [DISTWIDTH] IN BLOOD BY AUTOMATED COUNT: 15.4 % (ref 11.5–14.5)
EST. GFR  (NO RACE VARIABLE): >60 ML/MIN/1.73 M^2
ESTIMATED AVG GLUCOSE: 94 MG/DL (ref 68–131)
GLUCOSE SERPL-MCNC: 85 MG/DL (ref 70–110)
GPP - ADENOVIRUS 40/41: NOT DETECTED
GPP - CRYPTOSPORIDIUM: NOT DETECTED
GPP - ENTAMOEBA HISTOLYTICA: NOT DETECTED
GPP - ENTEROTOXIGENIC E COLI (ETEC): NOT DETECTED
GPP - GIARDIA LAMBLIA: NOT DETECTED
GPP - NOROVIRUS GI/GII: NOT DETECTED
GPP - ROTAVIRUS A: NOT DETECTED
GPP - SALMONELLA: NOT DETECTED
GPP - VIBRIO CHOLERA: NOT DETECTED
GPP - YERSINIA ENTEROCOLITICA: NOT DETECTED
HBA1C MFR BLD: 4.9 % (ref 4.5–6.2)
HCT VFR BLD AUTO: 37.9 % (ref 40–54)
HGB BLD-MCNC: 12.1 G/DL (ref 14–18)
IMM GRANULOCYTES # BLD AUTO: 0.05 K/UL (ref 0–0.04)
IMM GRANULOCYTES NFR BLD AUTO: 0.7 % (ref 0–0.5)
LACTATE PLASV-SCNC: NOT DETECTED MMOL/L
LYMPHOCYTES # BLD AUTO: 0.9 K/UL (ref 1–4.8)
LYMPHOCYTES NFR BLD: 12.7 % (ref 18–48)
MAGNESIUM SERPL-MCNC: 1.7 MG/DL (ref 1.6–2.6)
MCH RBC QN AUTO: 31 PG (ref 27–31)
MCHC RBC AUTO-ENTMCNC: 31.9 G/DL (ref 32–36)
MCV RBC AUTO: 97 FL (ref 82–98)
MONOCYTES # BLD AUTO: 0.5 K/UL (ref 0.3–1)
MONOCYTES NFR BLD: 6.6 % (ref 4–15)
NEUTROPHILS # BLD AUTO: 5.5 K/UL (ref 1.8–7.7)
NEUTROPHILS NFR BLD: 76 % (ref 38–73)
NRBC BLD-RTO: 0 /100 WBC
PLATELET # BLD AUTO: 222 K/UL (ref 150–450)
PLESIOMONAS SHIGELLOIDES: NOT DETECTED
PMV BLD AUTO: 10.9 FL (ref 9.2–12.9)
POCT GLUCOSE: 160 MG/DL (ref 70–110)
POCT GLUCOSE: 184 MG/DL (ref 70–110)
POCT GLUCOSE: 84 MG/DL (ref 70–110)
POCT GLUCOSE: 97 MG/DL (ref 70–110)
POTASSIUM SERPL-SCNC: 4.2 MMOL/L (ref 3.5–5.1)
PROT SERPL-MCNC: 6.6 G/DL (ref 6–8.4)
RBC # BLD AUTO: 3.9 M/UL (ref 4.6–6.2)
SAPOVIRUS: NOT DETECTED
SHIGELLA SP+EIEC IPAH STL QL NAA+PROBE: NOT DETECTED
SODIUM SERPL-SCNC: 135 MMOL/L (ref 136–145)
VIBRIO: NOT DETECTED
WBC # BLD AUTO: 7.25 K/UL (ref 3.9–12.7)

## 2024-09-11 PROCEDURE — 87449 NOS EACH ORGANISM AG IA: CPT | Performed by: EMERGENCY MEDICINE

## 2024-09-11 PROCEDURE — 63600175 PHARM REV CODE 636 W HCPCS: Performed by: HOSPITALIST

## 2024-09-11 PROCEDURE — 25000003 PHARM REV CODE 250: Performed by: FAMILY MEDICINE

## 2024-09-11 PROCEDURE — 87507 IADNA-DNA/RNA PROBE TQ 12-25: CPT | Performed by: INTERNAL MEDICINE

## 2024-09-11 PROCEDURE — 36415 COLL VENOUS BLD VENIPUNCTURE: CPT | Performed by: NURSE PRACTITIONER

## 2024-09-11 PROCEDURE — 82784 ASSAY IGA/IGD/IGG/IGM EACH: CPT | Performed by: INTERNAL MEDICINE

## 2024-09-11 PROCEDURE — 25000003 PHARM REV CODE 250: Performed by: NURSE PRACTITIONER

## 2024-09-11 PROCEDURE — 99221 1ST HOSP IP/OBS SF/LOW 40: CPT | Mod: ,,, | Performed by: FAMILY MEDICINE

## 2024-09-11 PROCEDURE — 83993 ASSAY FOR CALPROTECTIN FECAL: CPT | Performed by: INTERNAL MEDICINE

## 2024-09-11 PROCEDURE — 25000003 PHARM REV CODE 250: Performed by: HOSPITALIST

## 2024-09-11 PROCEDURE — 83036 HEMOGLOBIN GLYCOSYLATED A1C: CPT | Performed by: NURSE PRACTITIONER

## 2024-09-11 PROCEDURE — 63600175 PHARM REV CODE 636 W HCPCS: Performed by: NURSE PRACTITIONER

## 2024-09-11 PROCEDURE — 80053 COMPREHEN METABOLIC PANEL: CPT | Performed by: NURSE PRACTITIONER

## 2024-09-11 PROCEDURE — 85025 COMPLETE CBC W/AUTO DIFF WBC: CPT | Performed by: NURSE PRACTITIONER

## 2024-09-11 PROCEDURE — 82787 IGG 1 2 3 OR 4 EACH: CPT | Mod: 91 | Performed by: INTERNAL MEDICINE

## 2024-09-11 PROCEDURE — 83735 ASSAY OF MAGNESIUM: CPT | Performed by: NURSE PRACTITIONER

## 2024-09-11 PROCEDURE — 12000002 HC ACUTE/MED SURGE SEMI-PRIVATE ROOM

## 2024-09-11 PROCEDURE — 87324 CLOSTRIDIUM AG IA: CPT | Performed by: EMERGENCY MEDICINE

## 2024-09-11 PROCEDURE — 86704 HEP B CORE ANTIBODY TOTAL: CPT | Performed by: INTERNAL MEDICINE

## 2024-09-11 PROCEDURE — 86706 HEP B SURFACE ANTIBODY: CPT | Performed by: INTERNAL MEDICINE

## 2024-09-11 PROCEDURE — 25000003 PHARM REV CODE 250: Performed by: INTERNAL MEDICINE

## 2024-09-11 RX ORDER — DIPHENOXYLATE HYDROCHLORIDE AND ATROPINE SULFATE 2.5; .025 MG/1; MG/1
1 TABLET ORAL 4 TIMES DAILY PRN
Status: DISCONTINUED | OUTPATIENT
Start: 2024-09-11 | End: 2024-09-13 | Stop reason: HOSPADM

## 2024-09-11 RX ORDER — HYDROCODONE BITARTRATE AND ACETAMINOPHEN 5; 325 MG/1; MG/1
1 TABLET ORAL EVERY 4 HOURS PRN
Status: DISCONTINUED | OUTPATIENT
Start: 2024-09-11 | End: 2024-09-13 | Stop reason: HOSPADM

## 2024-09-11 RX ADMIN — HYOSCYAMINE SULFATE 0.38 MG: 0.12 TABLET SUBLINGUAL at 08:09

## 2024-09-11 RX ADMIN — COLCHICINE 0.6 MG: 0.6 TABLET, FILM COATED ORAL at 08:09

## 2024-09-11 RX ADMIN — COLCHICINE 0.6 MG: 0.6 TABLET, FILM COATED ORAL at 09:09

## 2024-09-11 RX ADMIN — VANCOMYCIN HYDROCHLORIDE 2000 MG: 1 INJECTION, POWDER, LYOPHILIZED, FOR SOLUTION INTRAVENOUS at 11:09

## 2024-09-11 RX ADMIN — SERTRALINE HYDROCHLORIDE 50 MG: 50 TABLET ORAL at 08:09

## 2024-09-11 RX ADMIN — HYDROCODONE BITARTRATE AND ACETAMINOPHEN 1 TABLET: 5; 325 TABLET ORAL at 08:09

## 2024-09-11 RX ADMIN — Medication 800 MG: at 02:09

## 2024-09-11 RX ADMIN — VANCOMYCIN HYDROCHLORIDE 2000 MG: 1 INJECTION, POWDER, LYOPHILIZED, FOR SOLUTION INTRAVENOUS at 12:09

## 2024-09-11 RX ADMIN — HYDROCODONE BITARTRATE AND ACETAMINOPHEN 1 TABLET: 5; 325 TABLET ORAL at 09:09

## 2024-09-11 RX ADMIN — SERTRALINE HYDROCHLORIDE 100 MG: 50 TABLET ORAL at 08:09

## 2024-09-11 RX ADMIN — METOPROLOL SUCCINATE 50 MG: 50 TABLET, FILM COATED, EXTENDED RELEASE ORAL at 08:09

## 2024-09-11 RX ADMIN — DIPHENOXYLATE HYDROCHLORIDE AND ATROPINE SULFATE 1 TABLET: 2.5; .025 TABLET ORAL at 07:09

## 2024-09-11 RX ADMIN — HYDROCODONE BITARTRATE AND ACETAMINOPHEN 1 TABLET: 5; 325 TABLET ORAL at 02:09

## 2024-09-11 RX ADMIN — ATORVASTATIN CALCIUM 10 MG: 10 TABLET, FILM COATED ORAL at 09:09

## 2024-09-11 RX ADMIN — LORAZEPAM 0.5 MG: 0.5 TABLET ORAL at 07:09

## 2024-09-11 RX ADMIN — Medication 800 MG: at 04:09

## 2024-09-11 RX ADMIN — DEXAMETHASONE 18 MG: 4 TABLET ORAL at 08:09

## 2024-09-11 RX ADMIN — ZOLPIDEM TARTRATE 10 MG: 5 TABLET, COATED ORAL at 09:09

## 2024-09-11 RX ADMIN — RIVAROXABAN 15 MG: 15 TABLET, FILM COATED ORAL at 08:09

## 2024-09-11 RX ADMIN — PANTOPRAZOLE SODIUM 40 MG: 40 TABLET, DELAYED RELEASE ORAL at 09:09

## 2024-09-11 RX ADMIN — ONDANSETRON 4 MG: 2 INJECTION INTRAMUSCULAR; INTRAVENOUS at 04:09

## 2024-09-11 RX ADMIN — HYOSCYAMINE SULFATE 0.38 MG: 0.12 TABLET SUBLINGUAL at 09:09

## 2024-09-11 RX ADMIN — CHOLESTYRAMINE LIGHT 4 GRAMS OF ANHYDROUS CHOLESTYRAMINE: 4 POWDER, FOR SUSPENSION ORAL at 08:09

## 2024-09-11 RX ADMIN — PANTOPRAZOLE SODIUM 40 MG: 40 TABLET, DELAYED RELEASE ORAL at 08:09

## 2024-09-11 RX ADMIN — Medication 800 MG: at 08:09

## 2024-09-11 NOTE — ASSESSMENT & PLAN NOTE
Use dexamethasone 18 mg daily in place of budesonide 6 mg while in-house   GI recommended conservative management  Holding rivaroxaban for now since FOBT +

## 2024-09-11 NOTE — PLAN OF CARE
Problem: Adult Inpatient Plan of Care  Goal: Plan of Care Review  Outcome: Progressing  Goal: Patient-Specific Goal (Individualized)  Outcome: Progressing  Goal: Absence of Hospital-Acquired Illness or Injury  Outcome: Progressing  Goal: Optimal Comfort and Wellbeing  Outcome: Progressing  Goal: Readiness for Transition of Care  Outcome: Progressing     Problem: Bariatric Environmental Safety  Goal: Safety Maintained with Care  Outcome: Progressing     Problem: Diabetes Comorbidity  Goal: Blood Glucose Level Within Targeted Range  Outcome: Progressing     Problem: Sepsis/Septic Shock  Goal: Optimal Coping  Outcome: Progressing  Goal: Absence of Bleeding  Outcome: Progressing  Goal: Blood Glucose Level Within Targeted Range  Outcome: Progressing  Goal: Absence of Infection Signs and Symptoms  Outcome: Progressing  Goal: Optimal Nutrition Intake  Outcome: Progressing     Problem: Acute Kidney Injury/Impairment  Goal: Fluid and Electrolyte Balance  Outcome: Progressing  Goal: Improved Oral Intake  Outcome: Progressing  Goal: Effective Renal Function  Outcome: Progressing     Problem: Infection  Goal: Absence of Infection Signs and Symptoms  Outcome: Progressing     Problem: Wound  Goal: Optimal Coping  Outcome: Progressing  Goal: Optimal Functional Ability  Outcome: Progressing  Goal: Absence of Infection Signs and Symptoms  Outcome: Progressing  Goal: Improved Oral Intake  Outcome: Progressing  Goal: Optimal Pain Control and Function  Outcome: Progressing  Goal: Skin Health and Integrity  Outcome: Progressing  Goal: Optimal Wound Healing  Outcome: Progressing

## 2024-09-11 NOTE — ASSESSMENT & PLAN NOTE
Concern for drug-seeking behavior  Patient has history of multiple admission with asking for Dilaudid during every admission  Patient's physical exam does not correspond to what is being reported for pain

## 2024-09-11 NOTE — H&P
[facility] - Emergency Dept  Hospital Medicine  History & Physical    Patient Name: Jacoby Jean-Baptiste  MRN: [id number]  Patient Class: IP- Inpatient  Admission Date: 9/10/2024  Attending Physician: Cele Moffett MD   Primary Care Provider: Hunter Aguilar III, MD         Patient information was obtained from patient and ER records.     Subjective:     Principal Problem:Ulcerative colitis    Chief Complaint:   Chief Complaint   Patient presents with    Abdominal Pain     Complains bright red blood BM's, began last Thursday.  Hx of UC.          HPI: 50 y/o male with pMHx of 2 DM, C difficile colitis, ulcerative colitis, cavitary pneumonia, HLD, and hypertension who presented to the ED with complaints of hematochezia and diarrhea since Thursday.  Patient reports that he has been having 10-15 bloody stools daily.  He is currently on budesonide 9 mg., Levsin 0.375 b.i.d., cholestyramine t.i.d., and Creon however patient never filled his cramps prescription.  Patient is with continued left upper arm site of previous I&D of abscess.  He follows outpatient with wound care for topical treatment.  On evaluation in the ED patient occult stool positive.  CO2 21, alk phos 52, magnesium 1.3, lipase 3, , hemoglobin 11.7, hematocrit 35.7.  CTA abdomen and pelvis with IV contrast with nonspecific colitis likely infectious or inflammatory in nature.  Nonobstructive left nephrolithiasis.  Cholecystectomy.  Patient will be admitted for continued IV antibiotics started in the ED with GI consult.    Past Medical History:   Diagnosis Date    C. difficile colitis     Cavitary pneumonia 11/2023    pos aspergillus serology    Diabetes mellitus 01/2022    Hyperlipidemia 2015    Hypertension 2015    Insomnia 2015    Ulcerative colitis        Past Surgical History:   Procedure Laterality Date    BRONCHOSCOPY WITH FLUOROSCOPY Left 11/20/2023    Procedure: BRONCHOSCOPY, WITH FLUOROSCOPY;  Surgeon: Lisa Villarreal MD;   Location: [facility] ENDO;  Service: Pulmonary;  Laterality: Left;    BRONCHOSCOPY WITH FLUOROSCOPY N/A 11/30/2023    Procedure: BRONCHOSCOPY, WITH FLUOROSCOPY;  Surgeon: Lisa Villarreal MD;  Location: [facility];  Service: Pulmonary;  Laterality: N/A;    CARDIAC ELECTROPHYSIOLOGY MAPPING AND ABLATION  2017    SVT    CHOLECYSTECTOMY  2011    COLONOSCOPY N/A 5/3/2022    Procedure: COLONOSCOPY;  Surgeon: Shan Jean III, MD;  Location: [facility];  Service: Endoscopy;  Laterality: N/A;    COLONOSCOPY N/A 3/16/2024    Procedure: COLONOSCOPY;  Surgeon: ALEKSANDER Ramos MD;  Location: [facility];  Service: Endoscopy;  Laterality: N/A;    COLONOSCOPY WITH FECAL MICROBIOTA TRANSFER N/A 3/21/2023    Procedure: COLONOSCOPY, WITH FECAL MICROBIOTA TRANSFER;  Surgeon: David Millan MD;  Location: [facility];  Service: Endoscopy;  Laterality: N/A;    ESOPHAGOGASTRODUODENOSCOPY N/A 4/24/2023    Procedure: EGD (ESOPHAGOGASTRODUODENOSCOPY);  Surgeon: Shan Jean III, MD;  Location: [facility];  Service: Endoscopy;  Laterality: N/A;    ESOPHAGOGASTRODUODENOSCOPY N/A 6/5/2024    Procedure: EGD (ESOPHAGOGASTRODUODENOSCOPY);  Surgeon: Odalis Mccabe MD;  Location: [facility];  Service: Endoscopy;  Laterality: N/A;    FLEXIBLE SIGMOIDOSCOPY N/A 6/5/2024    Procedure: SIGMOIDOSCOPY, FLEXIBLE;  Surgeon: Odalis Mccabe MD;  Location: [facility];  Service: Endoscopy;  Laterality: N/A;    FLEXIBLE SIGMOIDOSCOPY N/A 7/16/2024    Procedure: SIGMOIDOSCOPY, FLEXIBLE;  Surgeon: Shan Jean III, MD;  Location: [facility] ENDO;  Service: Endoscopy;  Laterality: N/A;    FLEXIBLE SIGMOIDOSCOPY N/A 8/13/2024    Procedure: SIGMOIDOSCOPY, FLEXIBLE;  Surgeon: Shan Jean III, MD;  Location: [facility];  Service: Endoscopy;  Laterality: N/A;    GANGLION CYST EXCISION Left 1992    UMBILICAL HERNIA REPAIR  2011    with mesh       Review of patient's allergies indicates:  No Known Allergies    Current Facility-Administered  Medications on File Prior to Encounter   Medication    lactated ringers infusion     Current Outpatient Medications on File Prior to Encounter   Medication Sig    budesonide (ENTOCORT EC) 3 mg capsule Take 3 capsules (9 mg total) by mouth once daily.    busPIRone (BUSPAR) 15 MG tablet Take 1 tablet (15 mg total) by mouth 3 (three) times daily.    cholestyramine (QUESTRAN) 4 gram packet Take 1 packet by mouth 3 (three) times daily.    ciprofloxacin HCl (CIPRO) 500 MG tablet Take 1 tablet (500 mg total) by mouth 2 (two) times daily. for 10 days (Patient taking differently: Take 500 mg by mouth 2 (two) times daily. NEW Rx - NOT YET STARTED)    colchicine (COLCRYS) 0.6 mg tablet Take 1 tablet (0.6 mg total) by mouth 2 (two) times daily.    ergocalciferol (VITAMIN D2) 50,000 unit Cap Take 1 capsule (50,000 Units total) by mouth every 7 days. (Patient taking differently: Take 50,000 Units by mouth every 7 days. FRIDAYS)    hyoscyamine (LEVBID) 0.375 mg Tb12 Take 375 mcg by mouth 2 (two) times daily.    LORazepam (ATIVAN) 0.5 MG tablet Take 1 tablet (0.5 mg total) by mouth every 6 (six) hours as needed for Anxiety.    metFORMIN (GLUCOPHAGE-XR) 500 MG ER 24hr tablet Take 1 tablet (500 mg total) by mouth 2 (two) times daily with meals.    metoprolol succinate (TOPROL-XL) 50 MG 24 hr tablet Take 1 tablet (50 mg total) by mouth once daily.    pantoprazole (PROTONIX) 40 MG tablet Take 40 mg by mouth 2 (two) times daily.    promethazine (PHENERGAN) 12.5 MG Tab Take 12.5 mg by mouth 4 (four) times daily as needed.    rivaroxaban (XARELTO) 15 mg Tab Take 1 tablet (15 mg total) by mouth 2 (two) times daily with meals. for 21 days    semaglutide (OZEMPIC) 2 mg/dose (8 mg/3 mL) PnIj Inject 2 mg into the skin every 7 days. (Patient taking differently: Inject 2 mg into the skin every 7 days. Mondays)    sertraline (ZOLOFT) 100 MG tablet Take 1 tablet (100 mg total) by mouth once daily. (Patient taking differently: Take 100 mg by mouth  once daily. 150 mg Total)    sertraline (ZOLOFT) 50 MG tablet Take 1 tablet (50 mg total) by mouth once daily. (Patient taking differently: Take 50 mg by mouth once daily. 150 mg Total)    simvastatin (ZOCOR) 20 MG tablet Take 1 tablet (20 mg total) by mouth every evening. (Patient taking differently: Take 20 mg by mouth once daily.)    zolpidem (AMBIEN) 10 mg Tab Take 1 tablet (10 mg total) by mouth nightly as needed (insomnia).    lipase-protease-amylase 12,000-38,000-60,000 units (CREON) CpDR Take 3 capsules by mouth 3 (three) times daily with meals. (Patient not taking: Reported on 9/10/2024)    rivaroxaban (XARELTO) 20 mg Tab Take 1 tablet (20 mg total) by mouth daily with dinner or evening meal. (Patient taking differently: Take 20 mg by mouth daily with dinner or evening meal. NEW Rx - NOT YET STARTED)     Family History       Problem Relation (Age of Onset)    Asthma Mother          Tobacco Use    Smoking status: Former     Current packs/day: 1.00     Average packs/day: 1 pack/day for 31.7 years (31.7 ttl pk-yrs)     Types: Cigarettes     Start date: 1993    Smokeless tobacco: Never    Tobacco comments:     Pt states he has stopped smoking with Chantix three weeks ago. 12/1/23   Substance and Sexual Activity    Alcohol use: Yes     Comment: ocass    Drug use: Not Currently    Sexual activity: Not Currently     Review of Systems   Gastrointestinal:  Positive for blood in stool and diarrhea.     Objective:     Vital Signs (Most Recent):  Temp: 97.8 °F (36.6 °C) (09/10/24 1132)  Pulse: 96 (09/10/24 1701)  Resp: 19 (09/10/24 1701)  BP: 138/69 (09/10/24 1701)  SpO2: 95 % (09/10/24 1701) Vital Signs (24h Range):  Temp:  [97.8 °F (36.6 °C)] 97.8 °F (36.6 °C)  Pulse:  [83-96] 96  Resp:  [16-19] 19  SpO2:  [95 %-100 %] 95 %  BP: (114-138)/(69-88) 138/69     Weight: 122.5 kg (270 lb)  Body mass index is 44.93 kg/m².     Physical Exam  Vitals reviewed.   Constitutional:       Appearance: Normal appearance.   HENT:       Head: Normocephalic and atraumatic.      Nose: No congestion.      Mouth/Throat:      Mouth: Mucous membranes are moist.      Pharynx: Oropharynx is clear.   Eyes:      Extraocular Movements: Extraocular movements intact.      Pupils: Pupils are equal, round, and reactive to light.   Cardiovascular:      Rate and Rhythm: Normal rate and regular rhythm.      Pulses: Normal pulses.      Heart sounds: Normal heart sounds.   Pulmonary:      Effort: Pulmonary effort is normal.      Breath sounds: Normal breath sounds.   Abdominal:      General: Bowel sounds are normal.      Palpations: Abdomen is soft.   Musculoskeletal:         General: Normal range of motion.      Cervical back: Normal range of motion and neck supple.   Skin:     General: Skin is warm and dry.      Capillary Refill: Capillary refill takes less than 2 seconds.      Findings: Wound (left forearm wound) present.   Neurological:      General: No focal deficit present.      Mental Status: He is alert and oriented to person, place, and time. Mental status is at baseline.   Psychiatric:         Mood and Affect: Mood normal.         Behavior: Behavior normal.         Judgment: Judgment normal.              CRANIAL NERVES     CN III, IV, VI   Pupils are equal, round, and reactive to light.       Significant Labs: All pertinent labs within the past 24 hours have been reviewed.  Recent Lab Results         09/10/24  2004   09/10/24  1640   09/10/24  1605   09/10/24  1532        Benzodiazepines     Negative         Phencyclidine     Negative         Albumin       3.6       ALP       52       ALT       14       Amphetamines, Urine     Negative         Anion Gap       11       Appearance, UA     Clear              Clear         AST       26       Barbituates, Urine     Negative         Baso #       0.05       Basophil %       0.6       Bilirubin (UA)     Negative              Negative         BILIRUBIN TOTAL       0.4  Comment: For infants and newborns,  interpretation of results should be based  on gestational age, weight and in agreement with clinical  observations.    Premature Infant recommended reference ranges:  Up to 24 hours.............<8.0 mg/dL  Up to 48 hours............<12.0 mg/dL  3-5 days..................<15.0 mg/dL  6-29 days.................<15.0 mg/dL         BUN       10       Calcium       8.8       Chloride       104       CO2       21       Cocaine, Urine     Negative         Color, UA     Yellow              Yellow         CPK       260       Creatinine       0.9       Urine Creatinine     77.9  Comment: The random urine reference ranges provided were established   for 24 hour urine collections.  No reference ranges exist for  random urine specimens.  Correlate clinically.           Differential Method       Automated       eGFR       >60.0       Eos #       0.3       Eos %       3.0       Glucose       77       Glucose, UA     Negative              Negative         Gran # (ANC)       5.2       Gran %       60.9       Hematocrit       35.7       Hemoglobin       11.7       Immature Grans (Abs)       0.06  Comment: Mild elevation in immature granulocytes is non specific and   can be seen in a variety of conditions including stress response,   acute inflammation, trauma and pregnancy. Correlation with other   laboratory and clinical findings is essential.         Immature Granulocytes       0.7       Ketones, UA     Negative              Negative         Leukocyte Esterase, UA     Negative              Negative         Lipase       3       Lymph #       2.1       Lymph %       23.9       Magnesium        1.3       MCH       31.5       MCHC       32.8       MCV       96       Mono #       0.9       Mono %       10.9       MPV       11.1       NITRITE UA     Negative              Negative         nRBC       0       Occult Blood   Positive           Blood, UA     TRACE              TRACE         Opiates, Urine     Negative         pH, UA     6.0               6.0         Platelet Count       247       POC Lactate 0.54             Potassium       4.6       PROTEIN TOTAL       6.5       Protein, UA     Negative  Comment: Recommend a 24 hour urine protein or a urine   protein/creatinine ratio if globulin induced proteinuria is  clinically suspected.                Negative  Comment: Recommend a 24 hour urine protein or a urine   protein/creatinine ratio if globulin induced proteinuria is  clinically suspected.           RBC       3.71       RDW       15.7       Sample VENOUS             Sodium       136       Spec Grav UA     1.010              1.010         Specimen UA     Urine, Clean Catch              Urine, Clean Catch         Marijuana (THC) Metabolite     Negative         Toxicology Information     SEE COMMENT  Comment: This screen includes the following classes of drugs at the   listed cut-off:    Benzodiazepines                  200 ng/ml  Cocaine metabolite               300 ng/ml  Opiates                          300 ng/ml  Barbiturates                     200 ng/ml  Amphetamines                    1000 ng/ml  Marijuana metabs (THC)            50 ng/ml  Phencyclidine (PCP)               25 ng/ml    High concentrations of Methylenedioxymethamphetamine (MDMA aka  Ectasy) and other structurally similar compounds may cross-   react with the Amphetamine/Methamphetamine screening   immunoassay giving a false positive result.    Note: This exception list includes only more common   interferants in toxicology screen testing.  Because of many   cross-reactantspositive results on toxicology drug screens   should be confirmed whenever results do not correlate with   clinical presentation.    This report is intended for use in clinical monitoring and  management of patients. It is not intended for use in   employment related drug testing.    Because of any cross-reactants, positive results on toxicology  drug screens should be confirmed whenever results do  not  correlate with clinical presentation.    Presumptive positive results are unconfirmed and may be used   only for medical purposes.           TSH       0.358       UROBILINOGEN UA     Negative              Negative         Stool WBC   No neutrophils seen           WBC       8.59               Significant Imaging: I have reviewed all pertinent imaging results/findings within the past 24 hours.    CT Abdomen Pelvis With IV Contrast Routine Oral Contrast  Narrative: EXAMINATION:  CT ABDOMEN PELVIS WITH IV CONTRAST    CLINICAL HISTORY:  LLQ abdominal pain;    TECHNIQUE:  Low dose axial images, sagittal and coronal reformations were obtained from the lung bases to the pubic symphysis following the IV administration of 100 mL of Omnipaque 350 .  Oral contrast was not given.    COMPARISON:  08/09/2024    FINDINGS:  The liver, spleen, pancreas, and adrenal glands are unremarkable.  There has been a cholecystectomy.    There are multiple left nephroliths, measuring up to 7 mm in the lower pole.  No hydronephrosis.    There is a 3.4 cm duodenal diverticulum.  The bowel is nondilated.  There is moderate generalized mural thickening of the sigmoid and descending colon, and to a lesser extent the transverse colon, accompanied by mild pericolonic fat stranding, in keeping with a nonspecific colitis.  No free air or fluid collection.  A normal appendix is present.    There is a 3 mm solid nodule along the major fissure of the right lung without significant change.  Impression: Nonspecific colitis, likely infectious or inflammatory in nature.    Nonobstructive left nephrolithiasis.    Cholecystectomy.    Electronically signed by: Felix Rodriguez MD  Date:    09/10/2024  Time:    17:32      Assessment/Plan:     * Ulcerative colitis  Use dexamethasone 18 mg daily in place of budesonide 6 mg while in-house   Zosyn continued  Consult GI   Hold Xarelto x 1 dose restart in a.m.      Type 2 diabetes mellitus without complication,  "without long-term current use of insulin  Patient's FSGs are controlled on current medication regimen.  Last A1c reviewed-   Lab Results   Component Value Date    HGBA1C 5.2 06/06/2024     Most recent fingerstick glucose reviewed- No results for input(s): "POCTGLUCOSE" in the last 24 hours.  Current correctional scale  Low  Maintain anti-hyperglycemic dose as follows-   Antihyperglycemics (From admission, onward)      Start     Stop Route Frequency Ordered    09/10/24 2117  insulin aspart U-100 pen 0-10 Units         -- SubQ Before meals & nightly PRN 09/10/24 2018        Hold Oral hypoglycemics while patient is in the hospital.    Open wound of left upper arm  Consult wound care      Hypomagnesemia  Patient has Abnormal Magnesium: hypomagnesemia. Will continue to monitor electrolytes closely. Will replace the affected electrolytes and repeat labs to be done after interventions completed. The patient's magnesium results have been reviewed and are listed below.  Patient given Magnesium in ED   Most Recent Today 6 d ago 7 d ago 2 wk ago 2 wk ago 2 wk ago 2 wk ago   Chem Profile   Magnesium 1.3 Low  (Today) 1.3 Low  1.5 Low  1.2 Low  1.5 Low  1.5 Low  1.8 1.1 Low        Morbid obesity  Body mass index is 44.93 kg/m². Morbid obesity complicates all aspects of disease management from diagnostic modalities to treatment. Weight loss encouraged and health benefits explained to patient.           VTE Risk Mitigation (From admission, onward)           Ordered     Reason for No Pharmacological VTE Prophylaxis  Once        Question:  Reasons:  Answer:  Already adequately anticoagulated on oral Anticoagulants    09/10/24 2010     IP VTE HIGH RISK PATIENT  Once         09/10/24 2010     Place sequential compression device  Until discontinued         09/10/24 2010                                    Karoline Gotti NP  Department of Hospital Medicine  [facility] Emergency Dept          "

## 2024-09-11 NOTE — HOSPITAL COURSE
50 y/o male with pMHx of 2 DM, C difficile colitis, DVT, ulcerative colitis, cavitary pneumonia, HLD, and hypertension who presented to the ED with complaints of hematochezia and diarrhea since Thursday and admitted for UC flare. Stool and c.diff study were neg. GI is following patient and recommended conservative management for pain control and continue with steroids. Hgb is stable so restarted home rivaroxaban on 9/15 for chronic DVT of RUE. Concern for drug-seeking behavior. Wound care is following for chronic left shoulder abscess. Patient has history of multiple admission with asking for Dilaudid during every admission.Patient's physical exam does not correspond to what is being reported for pain. Patient was discharged for 7 days with steroids. Recommended to follow up with GI and wound outpatient.

## 2024-09-11 NOTE — SUBJECTIVE & OBJECTIVE
Past Medical History:   Diagnosis Date    C. difficile colitis     Cavitary pneumonia 11/2023    pos aspergillus serology    Diabetes mellitus 01/2022    Hyperlipidemia 2015    Hypertension 2015    Insomnia 2015    Ulcerative colitis        Past Surgical History:   Procedure Laterality Date    BRONCHOSCOPY WITH FLUOROSCOPY Left 11/20/2023    Procedure: BRONCHOSCOPY, WITH FLUOROSCOPY;  Surgeon: Lisa Villarreal MD;  Location: [facility];  Service: Pulmonary;  Laterality: Left;    BRONCHOSCOPY WITH FLUOROSCOPY N/A 11/30/2023    Procedure: BRONCHOSCOPY, WITH FLUOROSCOPY;  Surgeon: Lisa Villarreal MD;  Location: [facility];  Service: Pulmonary;  Laterality: N/A;    CARDIAC ELECTROPHYSIOLOGY MAPPING AND ABLATION  2017    SVT    CHOLECYSTECTOMY  2011    COLONOSCOPY N/A 5/3/2022    Procedure: COLONOSCOPY;  Surgeon: Shan Jean III, MD;  Location: [facility];  Service: Endoscopy;  Laterality: N/A;    COLONOSCOPY N/A 3/16/2024    Procedure: COLONOSCOPY;  Surgeon: ALEKSANDER Ramos MD;  Location: [facility];  Service: Endoscopy;  Laterality: N/A;    COLONOSCOPY WITH FECAL MICROBIOTA TRANSFER N/A 3/21/2023    Procedure: COLONOSCOPY, WITH FECAL MICROBIOTA TRANSFER;  Surgeon: David Millan MD;  Location: [facility];  Service: Endoscopy;  Laterality: N/A;    ESOPHAGOGASTRODUODENOSCOPY N/A 4/24/2023    Procedure: EGD (ESOPHAGOGASTRODUODENOSCOPY);  Surgeon: Shan Jean III, MD;  Location: [facility];  Service: Endoscopy;  Laterality: N/A;    ESOPHAGOGASTRODUODENOSCOPY N/A 6/5/2024    Procedure: EGD (ESOPHAGOGASTRODUODENOSCOPY);  Surgeon: Odalis Mccabe MD;  Location: [facility];  Service: Endoscopy;  Laterality: N/A;    FLEXIBLE SIGMOIDOSCOPY N/A 6/5/2024    Procedure: SIGMOIDOSCOPY, FLEXIBLE;  Surgeon: Odalis Mccabe MD;  Location: [facility];  Service: Endoscopy;  Laterality: N/A;    FLEXIBLE SIGMOIDOSCOPY N/A 7/16/2024    Procedure: SIGMOIDOSCOPY, FLEXIBLE;  Surgeon: Shan Jean III, MD;   Location: [facility];  Service: Endoscopy;  Laterality: N/A;    FLEXIBLE SIGMOIDOSCOPY N/A 8/13/2024    Procedure: SIGMOIDOSCOPY, FLEXIBLE;  Surgeon: Shan Jean III, MD;  Location: [facility];  Service: Endoscopy;  Laterality: N/A;    GANGLION CYST EXCISION Left 1992    UMBILICAL HERNIA REPAIR  2011    with mesh       Review of patient's allergies indicates:  No Known Allergies    Current Facility-Administered Medications on File Prior to Encounter   Medication    lactated ringers infusion     Current Outpatient Medications on File Prior to Encounter   Medication Sig    budesonide (ENTOCORT EC) 3 mg capsule Take 3 capsules (9 mg total) by mouth once daily.    busPIRone (BUSPAR) 15 MG tablet Take 1 tablet (15 mg total) by mouth 3 (three) times daily.    cholestyramine (QUESTRAN) 4 gram packet Take 1 packet by mouth 3 (three) times daily.    ciprofloxacin HCl (CIPRO) 500 MG tablet Take 1 tablet (500 mg total) by mouth 2 (two) times daily. for 10 days (Patient taking differently: Take 500 mg by mouth 2 (two) times daily. NEW Rx - NOT YET STARTED)    colchicine (COLCRYS) 0.6 mg tablet Take 1 tablet (0.6 mg total) by mouth 2 (two) times daily.    ergocalciferol (VITAMIN D2) 50,000 unit Cap Take 1 capsule (50,000 Units total) by mouth every 7 days. (Patient taking differently: Take 50,000 Units by mouth every 7 days. FRIDAYS)    hyoscyamine (LEVBID) 0.375 mg Tb12 Take 375 mcg by mouth 2 (two) times daily.    LORazepam (ATIVAN) 0.5 MG tablet Take 1 tablet (0.5 mg total) by mouth every 6 (six) hours as needed for Anxiety.    metFORMIN (GLUCOPHAGE-XR) 500 MG ER 24hr tablet Take 1 tablet (500 mg total) by mouth 2 (two) times daily with meals.    metoprolol succinate (TOPROL-XL) 50 MG 24 hr tablet Take 1 tablet (50 mg total) by mouth once daily.    pantoprazole (PROTONIX) 40 MG tablet Take 40 mg by mouth 2 (two) times daily.    promethazine (PHENERGAN) 12.5 MG Tab Take 12.5 mg by mouth 4 (four) times daily as needed.     rivaroxaban (XARELTO) 15 mg Tab Take 1 tablet (15 mg total) by mouth 2 (two) times daily with meals. for 21 days    semaglutide (OZEMPIC) 2 mg/dose (8 mg/3 mL) PnIj Inject 2 mg into the skin every 7 days. (Patient taking differently: Inject 2 mg into the skin every 7 days. Mondays)    sertraline (ZOLOFT) 100 MG tablet Take 1 tablet (100 mg total) by mouth once daily. (Patient taking differently: Take 100 mg by mouth once daily. 150 mg Total)    sertraline (ZOLOFT) 50 MG tablet Take 1 tablet (50 mg total) by mouth once daily. (Patient taking differently: Take 50 mg by mouth once daily. 150 mg Total)    simvastatin (ZOCOR) 20 MG tablet Take 1 tablet (20 mg total) by mouth every evening. (Patient taking differently: Take 20 mg by mouth once daily.)    zolpidem (AMBIEN) 10 mg Tab Take 1 tablet (10 mg total) by mouth nightly as needed (insomnia).    lipase-protease-amylase 12,000-38,000-60,000 units (CREON) CpDR Take 3 capsules by mouth 3 (three) times daily with meals. (Patient not taking: Reported on 9/10/2024)    rivaroxaban (XARELTO) 20 mg Tab Take 1 tablet (20 mg total) by mouth daily with dinner or evening meal. (Patient taking differently: Take 20 mg by mouth daily with dinner or evening meal. NEW Rx - NOT YET STARTED)     Family History       Problem Relation (Age of Onset)    Asthma Mother          Tobacco Use    Smoking status: Former     Current packs/day: 1.00     Average packs/day: 1 pack/day for 31.7 years (31.7 ttl pk-yrs)     Types: Cigarettes     Start date: 1993    Smokeless tobacco: Never    Tobacco comments:     Pt states he has stopped smoking with Chantix three weeks ago. 12/1/23   Substance and Sexual Activity    Alcohol use: Yes     Comment: ocass    Drug use: Not Currently    Sexual activity: Not Currently     Review of Systems   Gastrointestinal:  Positive for blood in stool and diarrhea.     Objective:     Vital Signs (Most Recent):  Temp: 97.8 °F (36.6 °C) (09/10/24 1132)  Pulse: 96  (09/10/24 1701)  Resp: 19 (09/10/24 1701)  BP: 138/69 (09/10/24 1701)  SpO2: 95 % (09/10/24 1701) Vital Signs (24h Range):  Temp:  [97.8 °F (36.6 °C)] 97.8 °F (36.6 °C)  Pulse:  [83-96] 96  Resp:  [16-19] 19  SpO2:  [95 %-100 %] 95 %  BP: (114-138)/(69-88) 138/69     Weight: 122.5 kg (270 lb)  Body mass index is 44.93 kg/m².     Physical Exam  Vitals reviewed.   Constitutional:       Appearance: Normal appearance.   HENT:      Head: Normocephalic and atraumatic.      Nose: No congestion.      Mouth/Throat:      Mouth: Mucous membranes are moist.      Pharynx: Oropharynx is clear.   Eyes:      Extraocular Movements: Extraocular movements intact.      Pupils: Pupils are equal, round, and reactive to light.   Cardiovascular:      Rate and Rhythm: Normal rate and regular rhythm.      Pulses: Normal pulses.      Heart sounds: Normal heart sounds.   Pulmonary:      Effort: Pulmonary effort is normal.      Breath sounds: Normal breath sounds.   Abdominal:      General: Bowel sounds are normal.      Palpations: Abdomen is soft.   Musculoskeletal:         General: Normal range of motion.      Cervical back: Normal range of motion and neck supple.   Skin:     General: Skin is warm and dry.      Capillary Refill: Capillary refill takes less than 2 seconds.      Findings: Wound (left forearm wound) present.   Neurological:      General: No focal deficit present.      Mental Status: He is alert and oriented to person, place, and time. Mental status is at baseline.   Psychiatric:         Mood and Affect: Mood normal.         Behavior: Behavior normal.         Judgment: Judgment normal.              CRANIAL NERVES     CN III, IV, VI   Pupils are equal, round, and reactive to light.       Significant Labs: All pertinent labs within the past 24 hours have been reviewed.  Recent Lab Results         09/10/24  2004   09/10/24  1640   09/10/24  1605   09/10/24  1532        Benzodiazepines     Negative         Phencyclidine      Negative         Albumin       3.6       ALP       52       ALT       14       Amphetamines, Urine     Negative         Anion Gap       11       Appearance, UA     Clear              Clear         AST       26       Barbituates, Urine     Negative         Baso #       0.05       Basophil %       0.6       Bilirubin (UA)     Negative              Negative         BILIRUBIN TOTAL       0.4  Comment: For infants and newborns, interpretation of results should be based  on gestational age, weight and in agreement with clinical  observations.    Premature Infant recommended reference ranges:  Up to 24 hours.............<8.0 mg/dL  Up to 48 hours............<12.0 mg/dL  3-5 days..................<15.0 mg/dL  6-29 days.................<15.0 mg/dL         BUN       10       Calcium       8.8       Chloride       104       CO2       21       Cocaine, Urine     Negative         Color, UA     Yellow              Yellow         CPK       260       Creatinine       0.9       Urine Creatinine     77.9  Comment: The random urine reference ranges provided were established   for 24 hour urine collections.  No reference ranges exist for  random urine specimens.  Correlate clinically.           Differential Method       Automated       eGFR       >60.0       Eos #       0.3       Eos %       3.0       Glucose       77       Glucose, UA     Negative              Negative         Gran # (ANC)       5.2       Gran %       60.9       Hematocrit       35.7       Hemoglobin       11.7       Immature Grans (Abs)       0.06  Comment: Mild elevation in immature granulocytes is non specific and   can be seen in a variety of conditions including stress response,   acute inflammation, trauma and pregnancy. Correlation with other   laboratory and clinical findings is essential.         Immature Granulocytes       0.7       Ketones, UA     Negative              Negative         Leukocyte Esterase, UA     Negative              Negative         Lipase        3       Lymph #       2.1       Lymph %       23.9       Magnesium        1.3       MCH       31.5       MCHC       32.8       MCV       96       Mono #       0.9       Mono %       10.9       MPV       11.1       NITRITE UA     Negative              Negative         nRBC       0       Occult Blood   Positive           Blood, UA     TRACE              TRACE         Opiates, Urine     Negative         pH, UA     6.0              6.0         Platelet Count       247       POC Lactate 0.54             Potassium       4.6       PROTEIN TOTAL       6.5       Protein, UA     Negative  Comment: Recommend a 24 hour urine protein or a urine   protein/creatinine ratio if globulin induced proteinuria is  clinically suspected.                Negative  Comment: Recommend a 24 hour urine protein or a urine   protein/creatinine ratio if globulin induced proteinuria is  clinically suspected.           RBC       3.71       RDW       15.7       Sample VENOUS             Sodium       136       Spec Grav UA     1.010              1.010         Specimen UA     Urine, Clean Catch              Urine, Clean Catch         Marijuana (THC) Metabolite     Negative         Toxicology Information     SEE COMMENT  Comment: This screen includes the following classes of drugs at the   listed cut-off:    Benzodiazepines                  200 ng/ml  Cocaine metabolite               300 ng/ml  Opiates                          300 ng/ml  Barbiturates                     200 ng/ml  Amphetamines                    1000 ng/ml  Marijuana metabs (THC)            50 ng/ml  Phencyclidine (PCP)               25 ng/ml    High concentrations of Methylenedioxymethamphetamine (MDMA aka  Ectasy) and other structurally similar compounds may cross-   react with the Amphetamine/Methamphetamine screening   immunoassay giving a false positive result.    Note: This exception list includes only more common   interferants in toxicology screen testing.  Because of many    cross-reactantspositive results on toxicology drug screens   should be confirmed whenever results do not correlate with   clinical presentation.    This report is intended for use in clinical monitoring and  management of patients. It is not intended for use in   employment related drug testing.    Because of any cross-reactants, positive results on toxicology  drug screens should be confirmed whenever results do not  correlate with clinical presentation.    Presumptive positive results are unconfirmed and may be used   only for medical purposes.           TSH       0.358       UROBILINOGEN UA     Negative              Negative         Stool WBC   No neutrophils seen           WBC       8.59               Significant Imaging: I have reviewed all pertinent imaging results/findings within the past 24 hours.    CT Abdomen Pelvis With IV Contrast Routine Oral Contrast  Narrative: EXAMINATION:  CT ABDOMEN PELVIS WITH IV CONTRAST    CLINICAL HISTORY:  LLQ abdominal pain;    TECHNIQUE:  Low dose axial images, sagittal and coronal reformations were obtained from the lung bases to the pubic symphysis following the IV administration of 100 mL of Omnipaque 350 .  Oral contrast was not given.    COMPARISON:  08/09/2024    FINDINGS:  The liver, spleen, pancreas, and adrenal glands are unremarkable.  There has been a cholecystectomy.    There are multiple left nephroliths, measuring up to 7 mm in the lower pole.  No hydronephrosis.    There is a 3.4 cm duodenal diverticulum.  The bowel is nondilated.  There is moderate generalized mural thickening of the sigmoid and descending colon, and to a lesser extent the transverse colon, accompanied by mild pericolonic fat stranding, in keeping with a nonspecific colitis.  No free air or fluid collection.  A normal appendix is present.    There is a 3 mm solid nodule along the major fissure of the right lung without significant change.  Impression: Nonspecific colitis, likely infectious  or inflammatory in nature.    Nonobstructive left nephrolithiasis.    Cholecystectomy.    Electronically signed by: Felix Rodriguez MD  Date:    09/10/2024  Time:    17:32

## 2024-09-11 NOTE — CONSULTS
GASTROENTEROLOGY INPATIENT CONSULT NOTE  Patient Name: Jacoby Jean-Baptiste  Patient MRN: [id number]  Patient : 1975    Admit Date: 9/10/2024  Service date: 2024    Reason for Consult: BRUCE goddard    PCP: Hunter Aguilar III, MD    Chief Complaint   Patient presents with    Abdominal Pain     Complains bright red blood BM's, began last Thursday.  Hx of UC.         HPI: Patient is 49 y.o. male with PMHx 5HLD, DM on ozempic, HTN, LOLIS, umbilical hernia repair, tobacco use, C. diff s/p Vanco / dificid / FMT / rebyota presents for evaluation of  diarrhea. Acute/chronic onset, intermittent, progressive since stopping remicade. Was having 10+ BM daily but went down to 1-2 BM daily w/ intermittent bleeding while on remicade . Got fecal xpolant in 3/'23. Remicade stopped due to cavitary fungal lesions likely from immunosuppression from remicade. Started on Entyvio in  but seen again in hospital in early  & again in 3/'24 w/ severe disease. Omvoq started late  after insurance fight.  Had 3rd dose of loading Omvoq on 24.     Patient admitted with recurrence of bloody diarrhea 10 to 12 bowel movements a day as well as left upper screen abscess.   Cdiff negative today  Patient was kept NPO in case we want to do a procedure today and NSAID doing he reports that he has had no bowel movements this morning.  He takes cholestyramine at home for diarrhea.  He says Lomotil has temporized him in the past as well.     Latest Reference Range & Units Most Recent 23 05:40 24 10:45 03/15/24 19:02 24 05:35 24 16:29 24 12:25   Calprotectin 0 - 120 ug/g 1100 (H)  24 12:25 201 (H) 3070 (H) >8000 (H) 2850 (H) 2480 (H) 1100 (H)   (H): Data is abnormally high      CHART REVIEW:  CT ABd   C. Diff  - negative  EGD/colon 6/'24 - small HH; ampullary tic; mild ileitis (TI nml previously.?backwash?), L sided colitis  -reflux esophagitis. focal intestinal metaplasia antrum. Mild ileitis;  sigmoid/proctitis; findings c/w UC; CMV negative in L sided biopsies  Labs 6/'24 -  Calprotectin 2480 on Omvoh at around 2 months. (Was >8K on entyvio but had near normalized on remicade); Negative Stool PCR  Hospitalization 5/'24 - colitis on CT scan; Neg C. diff   Hospitalization 3/'24 - Calpro >8K; flex w/ severe L sided UC; placed on steroids. C. diff neg;....d/c entyvio  Hospitalization 2/'24; CT 2/'24 - fatty liver; L sided colitis; Calpro back up ot 3070 (201 on remicade); Neg C. diff  Entyvio started 1/'24  Hospitalization 11/'23 - cavitary fungal lesions  Labs 10/'23 - C.diff negative after vanco and rebyota enemas.  Labs 7/'23 -  Calprotectin 201 on remicade w/ C. diff...tx w/ vanco w/ long taper +/- rebyota; Nml CRP much better on remicade  EGD 4/'23 - candidate esophagitis / duodenitis; HP neg gastirits  Hospitalization 4/'23 - colitis on CT; C. diff neg; Remicade loaded  Colon 3/'23 - Pan-colitis; Nml TI; s/p FMT for refractory C. diff  C. DIff + 3/'23  Labs 1/'23 - CRP 21; Stool PCR + C. DIff....  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        Past Medical History:  Past Medical History:   Diagnosis Date    C. difficile colitis     Cavitary pneumonia 11/2023    pos aspergillus serology    Diabetes mellitus 01/2022    Hyperlipidemia 2015    Hypertension 2015    Insomnia 2015    Ulcerative colitis         Past Surgical History:  Past Surgical History:   Procedure Laterality Date    BRONCHOSCOPY WITH FLUOROSCOPY Left 11/20/2023    Procedure: BRONCHOSCOPY, WITH FLUOROSCOPY;  Surgeon: Lisa Villarreal MD;  Location: [facility];  Service: Pulmonary;  Laterality: Left;    BRONCHOSCOPY WITH FLUOROSCOPY N/A 11/30/2023    Procedure: BRONCHOSCOPY, WITH FLUOROSCOPY;  Surgeon: Lisa Villarreal MD;  Location: [facility] ENDO;  Service: Pulmonary;  Laterality: N/A;    CARDIAC ELECTROPHYSIOLOGY MAPPING  AND ABLATION  2017    SVT    CHOLECYSTECTOMY  2011    COLONOSCOPY N/A 5/3/2022    Procedure: COLONOSCOPY;  Surgeon: Shan Jean III, MD;  Location: [facility];  Service: Endoscopy;  Laterality: N/A;    COLONOSCOPY N/A 3/16/2024    Procedure: COLONOSCOPY;  Surgeon: ALEKSANDER Ramos MD;  Location: [facility] END[facility];  Service: Endoscopy;  Laterality: N/A;    COLONOSCOPY WITH FECAL MICROBIOTA TRANSFER N/A 3/21/2023    Procedure: COLONOSCOPY, WITH FECAL MICROBIOTA TRANSFER;  Surgeon: David Millan MD;  Location: [facility];  Service: Endoscopy;  Laterality: N/A;    ESOPHAGOGASTRODUODENOSCOPY N/A 4/24/2023    Procedure: EGD (ESOPHAGOGASTRODUODENOSCOPY);  Surgeon: Shan Jean III, MD;  Location: [facility];  Service: Endoscopy;  Laterality: N/A;    ESOPHAGOGASTRODUODENOSCOPY N/A 6/5/2024    Procedure: EGD (ESOPHAGOGASTRODUODENOSCOPY);  Surgeon: Odalis Mccabe MD;  Location: [facility];  Service: Endoscopy;  Laterality: N/A;    FLEXIBLE SIGMOIDOSCOPY N/A 6/5/2024    Procedure: SIGMOIDOSCOPY, FLEXIBLE;  Surgeon: Odalis Mccabe MD;  Location: [facility];  Service: Endoscopy;  Laterality: N/A;    FLEXIBLE SIGMOIDOSCOPY N/A 7/16/2024    Procedure: SIGMOIDOSCOPY, FLEXIBLE;  Surgeon: Shan Jean III, MD;  Location: [facility];  Service: Endoscopy;  Laterality: N/A;    FLEXIBLE SIGMOIDOSCOPY N/A 8/13/2024    Procedure: SIGMOIDOSCOPY, FLEXIBLE;  Surgeon: Shan Jean III, MD;  Location: [facility];  Service: Endoscopy;  Laterality: N/A;    GANGLION CYST EXCISION Left 1992    UMBILICAL HERNIA REPAIR  2011    with mesh        Home Medications:  Medications Prior to Admission   Medication Sig Dispense Refill Last Dose    budesonide (ENTOCORT EC) 3 mg capsule Take 3 capsules (9 mg total) by mouth once daily. 90 capsule 0 9/10/2024 at 09:00    busPIRone (BUSPAR) 15 MG tablet Take 1 tablet (15 mg total) by mouth 3 (three) times daily. 90 tablet 5 9/10/2024 at 09:00    cholestyramine (QUESTRAN) 4 gram  packet Take 1 packet by mouth 3 (three) times daily.   9/7/2024    ciprofloxacin HCl (CIPRO) 500 MG tablet Take 1 tablet (500 mg total) by mouth 2 (two) times daily. for 10 days (Patient taking differently: Take 500 mg by mouth 2 (two) times daily. NEW Rx - NOT YET STARTED) 20 tablet 0     colchicine (COLCRYS) 0.6 mg tablet Take 1 tablet (0.6 mg total) by mouth 2 (two) times daily. 180 tablet 0 9/10/2024    ergocalciferol (VITAMIN D2) 50,000 unit Cap Take 1 capsule (50,000 Units total) by mouth every 7 days. (Patient taking differently: Take 50,000 Units by mouth every 7 days. FRIDAYS) 12 capsule 1 9/7/2024    hyoscyamine (LEVBID) 0.375 mg Tb12 Take 375 mcg by mouth 2 (two) times daily.   9/10/2024 at 09:00    LORazepam (ATIVAN) 0.5 MG tablet Take 1 tablet (0.5 mg total) by mouth every 6 (six) hours as needed for Anxiety. 90 tablet 5 9/10/2024 at 09:00    metFORMIN (GLUCOPHAGE-XR) 500 MG ER 24hr tablet Take 1 tablet (500 mg total) by mouth 2 (two) times daily with meals. 180 tablet 1 9/10/2024 at 9:00    metoprolol succinate (TOPROL-XL) 50 MG 24 hr tablet Take 1 tablet (50 mg total) by mouth once daily. 90 tablet 1 9/10/2024 at 09:00    pantoprazole (PROTONIX) 40 MG tablet Take 40 mg by mouth 2 (two) times daily.   9/10/2024 at 09:00    promethazine (PHENERGAN) 12.5 MG Tab Take 12.5 mg by mouth 4 (four) times daily as needed.   9/9/2024    rivaroxaban (XARELTO) 15 mg Tab Take 1 tablet (15 mg total) by mouth 2 (two) times daily with meals. for 21 days 42 tablet 0 9/10/2024 at 09:100    semaglutide (OZEMPIC) 2 mg/dose (8 mg/3 mL) PnIj Inject 2 mg into the skin every 7 days. (Patient taking differently: Inject 2 mg into the skin every 7 days. Mondays) 3 mL 5 9/2/2024    sertraline (ZOLOFT) 100 MG tablet Take 1 tablet (100 mg total) by mouth once daily. (Patient taking differently: Take 100 mg by mouth once daily. 150 mg Total) 90 tablet 1 9/10/2024 at 09:00    sertraline (ZOLOFT) 50 MG tablet Take 1 tablet (50 mg  total) by mouth once daily. (Patient taking differently: Take 50 mg by mouth once daily. 150 mg Total) 90 tablet 1 9/10/2024 at 9:00    simvastatin (ZOCOR) 20 MG tablet Take 1 tablet (20 mg total) by mouth every evening. (Patient taking differently: Take 20 mg by mouth once daily.) 90 tablet 1 9/9/2024 at 21:00    zolpidem (AMBIEN) 10 mg Tab Take 1 tablet (10 mg total) by mouth nightly as needed (insomnia). 30 tablet 2 9/9/2024 at 21:00    lipase-protease-amylase 12,000-38,000-60,000 units (CREON) CpDR Take 3 capsules by mouth 3 (three) times daily with meals. (Patient not taking: Reported on 9/10/2024) 270 capsule 11 Not Taking    rivaroxaban (XARELTO) 20 mg Tab Take 1 tablet (20 mg total) by mouth daily with dinner or evening meal. (Patient taking differently: Take 20 mg by mouth daily with dinner or evening meal. NEW Rx - NOT YET STARTED) 90 tablet 1        Inpatient Medications:  Review of patient's allergies indicates:  No Known Allergies    Social History:   Social History     Occupational History    Not on file   Tobacco Use    Smoking status: Former     Current packs/day: 1.00     Average packs/day: 1 pack/day for 31.7 years (31.7 ttl pk-yrs)     Types: Cigarettes     Start date: 1993    Smokeless tobacco: Never    Tobacco comments:     Pt states he has stopped smoking with Chantix three weeks ago. 12/1/23   Substance and Sexual Activity    Alcohol use: Yes     Comment: ocass    Drug use: Not Currently    Sexual activity: Not Currently       Family History:   Family History   Problem Relation Name Age of Onset    Asthma Mother         Review of Systems:  GENERAL: No fever, chills, weight loss  SKIN: No rashes, jaundice, pruritus  HEENT: No rhinorrhea, epistaxis, vision changes.  No trauma, tinnitus, lymphadenopathy or pharyngitis  CV: No chest pain, palpitations, edima or BAILEY  PULM: No cough or sputum production.  No wheezing.  GI: AS IN HPI  URINARY:  No hematuria, dysuria  MS: No change in muscle or joint  "pain, weakness, or ROM  Neuro: No focal neurologic changes  PSYCH: No change in mood or personality.  No suicidal ideation.  ENDOCRINE: No fatigue      OBJECTIVE:    Vitals:    09/10/24 2138 09/10/24 2339 09/11/24 0235 09/11/24 0355   BP:  137/71  (!) 120/58   BP Location:       Pulse:  91  69   Resp:  17 18 17   Temp:  98.1 °F (36.7 °C)  98 °F (36.7 °C)   TempSrc:       SpO2:  96%  98%   Weight: 119.1 kg (262 lb 9.1 oz)      Height: 5' 5" (1.651 m)        Physical Exam:    GEN: well-developed, well-nourished, awake and alert, non-toxic appearing adult  HEENT: PERRL, sclera anicteric, oral mucosa pink and moist without lesion  NECK: trachea midline; Good ROM  CV: regular rate and rhythm, no murmurs or gallops  RESP: clear to auscultation bilaterally, no wheezes, rhonci or rales  ABD: soft, non-tender, non-distended, normal bowel sounds  EXT: no swelling or edema, 2+ pulses distally  SKIN: no rashes or jaundice  PSYCH: normal affect    Labs:   Recent Labs   Lab 09/04/24  1415 09/10/24  1532   WBC 8.17 8.59   HGB 12.0* 11.7*    247   * 96     No results for input(s): "IRON", "FERRITIN" in the last 168 hours.    Invalid input(s): "IRONSAT"  Recent Labs   Lab 09/10/24  1532      K 4.6   BUN 10   CREATININE 0.9   ALBUMIN 3.6   AST 26   ALT 14   ALKPHOS 52*            Radiology Review:  Imaging Results              CT Abdomen Pelvis With IV Contrast Routine Oral Contrast (Final result)  Result time 09/10/24 17:32:03      Final result by Felix Rodriguez MD (09/10/24 17:32:03)                   Impression:      Nonspecific colitis, likely infectious or inflammatory in nature.    Nonobstructive left nephrolithiasis.    Cholecystectomy.      Electronically signed by: Felix Rodriguez MD  Date:    09/10/2024  Time:    17:32               Narrative:    EXAMINATION:  CT ABDOMEN PELVIS WITH IV CONTRAST    CLINICAL HISTORY:  LLQ abdominal pain;    TECHNIQUE:  Low dose axial images, sagittal and coronal " reformations were obtained from the lung bases to the pubic symphysis following the IV administration of 100 mL of Omnipaque 350 .  Oral contrast was not given.    COMPARISON:  08/09/2024    FINDINGS:  The liver, spleen, pancreas, and adrenal glands are unremarkable.  There has been a cholecystectomy.    There are multiple left nephroliths, measuring up to 7 mm in the lower pole.  No hydronephrosis.    There is a 3.4 cm duodenal diverticulum.  The bowel is nondilated.  There is moderate generalized mural thickening of the sigmoid and descending colon, and to a lesser extent the transverse colon, accompanied by mild pericolonic fat stranding, in keeping with a nonspecific colitis.  No free air or fluid collection.  A normal appendix is present.    There is a 3 mm solid nodule along the major fissure of the right lung without significant change.                                          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    Long-term use of immunosuppressant medication 12/02/2023    Hepatosplenomegaly 11/30/2023    Other chest pain 11/17/2023    PUD (peptic ulcer disease) 04/24/2023    Dehydration 04/20/2023    Ulcerative colitis 04/20/2023    History of Clostridioides difficile colitis 04/20/2023    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 male with PMHx 5HLD, DM on ozempic, HTN, LOLIS, umbilical hernia repair, tobacco use, C. diff s/p Vanco / dificid / FMT / rebyota   Ulcerative colitis only slowly result responding to omvoh. IBD serology panel negative.    Recurrent infections - check Immunoglobulin G1-4 and IgM r/o CVID  Diarrhea - patient on budesonide, add Lomotil in addition to cholestyramine 4 antidiarrheal effect.  And we will add Uceris foam as an outpatient  LUE abscess - ID input to minimize antibiotics with h/o CDIFF  Diabetes on Ozempic - last hba1c 5.2.  .  I have had to patient's flare there ulcerative colitis with initiation of Ozempic.  With recurrent hospitalizations and stable diabetes recommend holding Ozempic until ulcerative colitis can be better controlled  Do not anticipate any repeated endoscopic evaluation during this admission.  Once pain and diarrhea controlled and antibiotic regimen tapered to abscess left upper extremity coverage anticipate discharge home       Thank you for this consult.    Odalis Mccabe  9/11/2024  7:31 AM

## 2024-09-11 NOTE — NURSING
Nurses Note -- 4 Eyes      9/11/2024   1:48 AM      Skin assessed during: Admit      [] No Altered Skin Integrity Present    []Prevention Measures Documented      [x] Yes- Altered Skin Integrity Present or Discovered   [x] LDA Added if Not in Epic (Describe Wound)   [x] New Altered Skin Integrity was Present on Admit and Documented in LDA   [x] Wound Image Taken    Wound Care Consulted? Yes    Attending Nurse:  Shauna Wang RN/Staff Member:  charles smith RN

## 2024-09-11 NOTE — PROGRESS NOTES
VANCOMYCIN PHARMACOKINETIC NOTE:  Vancomycin Day # 1    Objective/Assessment:    Diagnosis/Indication for Vancomycin:Skin & Soft Tissue infection      49 y.o., male; Actual Body Weight = 122.5 kg (270 lb).    The patient has the following labs:  9/10/2024 Estimated Creatinine Clearance: 119.3 mL/min (based on SCr of 0.9 mg/dL). Lab Results   Component Value Date    BUN 10 09/10/2024     Lab Results   Component Value Date    WBC 8.59 09/10/2024          Plan:  Adjust vancomycin dose and/or frequency based on the patient's actual weight and renal function:  Initiate Vancomycin 2000 mg IV every 12 hours.  Orders have been entered into patient's chart.        Vancomycin trough level has been ordered for 1000 on 09/12.    Pharmacy will manage vancomycin therapy, monitor serum vancomycin levels, monitor renal function and adjust regimen as necessary.    Thank you for allowing us to participate in this patient's care.     Jessica Aguilar 9/10/2024   Department of Pharmacy  Ext [phone]

## 2024-09-11 NOTE — ASSESSMENT & PLAN NOTE
"Patient's FSGs are controlled on current medication regimen.  Last A1c reviewed-   Lab Results   Component Value Date    HGBA1C 5.2 06/06/2024     Most recent fingerstick glucose reviewed- No results for input(s): "POCTGLUCOSE" in the last 24 hours.  Current correctional scale  Low  Maintain anti-hyperglycemic dose as follows-   Antihyperglycemics (From admission, onward)      Start     Stop Route Frequency Ordered    09/10/24 2117  insulin aspart U-100 pen 0-10 Units         -- SubQ Before meals & nightly PRN 09/10/24 2018        Hold Oral hypoglycemics while patient is in the hospital.  "

## 2024-09-11 NOTE — PLAN OF CARE
[facility]  Initial Discharge Assessment    Assessment completed at bedside with Pt, and all information on FaceSheet confirmed, including demographics, PCP, pharmacy and insurance. Pt has not addressed advance directives. He currently lives in [place] alone. His PCP is Hunter Aguilar MD, and last appointment was two weeks ago. He denies any DME/HD/Blood Thinners. Pt receives services from [facility] verified by [facility]. Estiven(Sister-In-Law)Jessica (Sister)[phone] (Mobile) will transport back home after discharge. He had a recent hospitalization and readmission is complete. Plan for discharge is to return home and resume home health with [facility]. Case Management to continue to follow for discharge planning needs.        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 No  Secondary Emergency Contact: Dallas Jean-Baptiste   [place]  Mobile Phone: [phone]  Relation: Brother  Preferred language: English   needed? No    Discharge Plan A: Home Health  Discharge Plan B: Home      [id number] - [place], [place] - [address] [facility]  [address]  [address]  [place] [address]  Phone: [phone] Fax: [phone]    [facility] - [place], [place] - [address]   [address] [facility] [address]  Phone: [phone] Fax:  [phone]    [facility]  [address]  [facility] [address]  Phone: [phone] Fax: [phone]      Initial Assessment (most recent)       Adult Discharge Assessment - 09/11/24 0936          Discharge Assessment    Assessment Type Discharge Planning Assessment     Confirmed/corrected address, phone number and insurance Yes     Confirmed Demographics Correct on Facesheet     Source of Information patient     If unable to respond/provide information was family/caregiver contacted? Yes     Contact Name/Number Estiven (Sister-In-Law)Jessica (Sister)  [phone] (Mobile)     When was your last doctors appointment? --   two weeks    Does patient/caregiver understand observation status Yes     Reason For Admission ulcerative rectosigmoidi     People in Home alone     Do you expect to return to your current living situation? Yes     Do you have help at home or someone to help you manage your care at home? Yes     Who are your caregiver(s) and their phone number(s)? Estiven (Sister-In-Law)Jessica (Sister)  [phone] (Mobile)     Prior to hospitilization cognitive status: Alert/Oriented     Current cognitive status: Alert/Oriented     Walking or Climbing Stairs Difficulty no     Dressing/Bathing Difficulty no     Home Accessibility not wheelchair accessible     Home Layout Able to live on 1st floor     Equipment Currently Used at Home none     Readmission within 30 days? Yes     Patient currently being followed by outpatient case management? No     Do you currently have service(s) that help you manage your care at home? Yes     Name and Contact number of agency [facility]     Is the pt/caregiver preference to resume services with current agency Yes     Do you take prescription medications? Yes     Do you have prescription coverage? Yes     Coverage [employer] - [employer] ([employer]) -     Do you have any problems affording any of your prescribed medications?  No     Is the patient taking medications as prescribed? yes     Who is going to help you get home at discharge? Estiven (Sister-In-Law),Jessica (Sister)  [phone] (Mobile)     How do you get to doctors appointments? car, drives self     Are you on dialysis? No     Do you take coumadin? No     Discharge Plan A Home Health     Discharge Plan B Home     DME Needed Upon Discharge  none     Discharge Plan discussed with: Patient     Transition of Care Barriers None        Physical Activity    On average, how many days per week do you engage in moderate to strenuous exercise (like a brisk walk)? 5 days     On average, how many minutes do you engage in exercise at this level? 30 min        Financial Resource Strain    How hard is it for you to pay for the very basics like food, housing, medical care, and heating? Not hard at all        Housing Stability    In the last 12 months, was there a time when you were not able to pay the mortgage or rent on time? No     At any time in the past 12 months, were you homeless or living in a shelter (including now)? No        Transportation Needs    Has the lack of transportation kept you from medical appointments, meetings, work or from getting things needed for daily living? No        Food Insecurity    Within the past 12 months, you worried that your food would run out before you got the money to buy more. Never true     Within the past 12 months, the food you bought just didn't last and you didn't have money to get more. Never true        Stress    Do you feel stress - tense, restless, nervous, or anxious, or unable to sleep at night because your mind is troubled all the time - these days? Not at all        Social Isolation    How often do you feel lonely or isolated from those around you?  Never        Alcohol Use    Q1: How often do you have a drink containing alcohol? Never     Q2: How many drinks containing alcohol do you have on a typical day when you are drinking? Patient  does not drink     Q3: How often do you have six or more drinks on one occasion? Never        Utilities    In the past 12 months has the electric, gas, oil, or water company threatened to shut off services in your home? No        Health Literacy    How often do you need to have someone help you when you read instructions, pamphlets, or other written material from your doctor or pharmacy? Never

## 2024-09-11 NOTE — SUBJECTIVE & OBJECTIVE
Call placed to Yancy's telephone number on file. There was no answer and no voicemail.    Calling to find out if she is using her CPAP machine.   She  needs to bring the machine or the SD card from the machine to her appointment so that we acquire a download of data, if she should call.   Interval History: Patient said he feels better but still in pain    Review of Systems   All other systems reviewed and are negative.    Objective:     Vital Signs (Most Recent):  Temp: 97.8 °F (36.6 °C) (09/11/24 1146)  Pulse: 78 (09/11/24 1146)  Resp: 18 (09/11/24 1146)  BP: 138/78 (09/11/24 1146)  SpO2: 96 % (09/11/24 1146) Vital Signs (24h Range):  Temp:  [97.5 °F (36.4 °C)-98.1 °F (36.7 °C)] 97.8 °F (36.6 °C)  Pulse:  [69-96] 78  Resp:  [17-20] 18  SpO2:  [95 %-98 %] 96 %  BP: (118-144)/(58-84) 138/78     Weight: 119.1 kg (262 lb 9.1 oz)  Body mass index is 43.69 kg/m².    Intake/Output Summary (Last 24 hours) at 9/11/2024 1317  Last data filed at 9/11/2024 0902  Gross per 24 hour   Intake 1650 ml   Output 500 ml   Net 1150 ml         Physical Exam  Constitutional:       Appearance: He is obese.   Cardiovascular:      Rate and Rhythm: Normal rate.      Pulses: Normal pulses.   Pulmonary:      Effort: Pulmonary effort is normal.   Abdominal:      General: Abdomen is flat. There is no distension.      Tenderness: There is no abdominal tenderness.   Skin:     Comments: Refer to imaging for wound   Neurological:      Mental Status: He is alert and oriented to person, place, and time.             Significant Labs: All pertinent labs within the past 24 hours have been reviewed.    Significant Imaging: I have reviewed all pertinent imaging results/findings within the past 24 hours.

## 2024-09-11 NOTE — ASSESSMENT & PLAN NOTE
Patient has Abnormal Magnesium: hypomagnesemia. Will continue to monitor electrolytes closely. Will replace the affected electrolytes and repeat labs to be done after interventions completed. The patient's magnesium results have been reviewed and are listed below.  Patient given Magnesium in ED   Most Recent Today 6 d ago 7 d ago 2 wk ago 2 wk ago 2 wk ago 2 wk ago   Chem Profile   Magnesium 1.3 Low  (Today) 1.3 Low  1.5 Low  1.2 Low  1.5 Low  1.5 Low  1.8 1.1 Low

## 2024-09-11 NOTE — PROGRESS NOTES
[facility] Medicine  Progress Note    Patient Name: Jacoby Jean-Baptiste  MRN: [id number]  Patient Class: IP- Inpatient   Admission Date: 9/10/2024  Length of Stay: 1 days  Attending Physician: Matteo Melendez DO  Primary Care Provider: Hunter Aguilar III, MD        Subjective:     Principal Problem:Ulcerative colitis        HPI:  50 y/o male with pMHx of 2 DM, C difficile colitis, ulcerative colitis, cavitary pneumonia, HLD, and hypertension who presented to the ED with complaints of hematochezia and diarrhea since Thursday.  Patient reports that he has been having 10-15 bloody stools daily.  He is currently on budesonide 9 mg., Levsin 0.375 b.i.d., cholestyramine t.i.d., and Creon however patient never filled his cramps prescription.  Patient is with continued left upper arm site of previous I&D of abscess.  He follows outpatient with wound care for topical treatment.  On evaluation in the ED patient occult stool positive.  CO2 21, alk phos 52, magnesium 1.3, lipase 3, , hemoglobin 11.7, hematocrit 35.7.  CTA abdomen and pelvis with IV contrast with nonspecific colitis likely infectious or inflammatory in nature.  Nonobstructive left nephrolithiasis.  Cholecystectomy.  Patient will be admitted for continued IV antibiotics started in the ED with GI consult.    Overview/Hospital Course:  50 y/o male with pMHx of 2 DM, C difficile colitis, ulcerative colitis, cavitary pneumonia, HLD, and hypertension who presented to the ED with complaints of hematochezia and diarrhea since Thursday and admitted for UC flare. Stool and c.diff study were neg. GI is following patient and recommended conservative management for pain control and continue with steroids. Holding rivaroxaban for now since FOBT + Concern for drug-seeking behavior. Patient has history of multiple admission with asking for Dilaudid during every admission.Patient's physical exam does not correspond to what is being reported for  pain      Interval History: Patient said he feels better but still in pain    Review of Systems   All other systems reviewed and are negative.    Objective:     Vital Signs (Most Recent):  Temp: 97.8 °F (36.6 °C) (09/11/24 1146)  Pulse: 78 (09/11/24 1146)  Resp: 18 (09/11/24 1146)  BP: 138/78 (09/11/24 1146)  SpO2: 96 % (09/11/24 1146) Vital Signs (24h Range):  Temp:  [97.5 °F (36.4 °C)-98.1 °F (36.7 °C)] 97.8 °F (36.6 °C)  Pulse:  [69-96] 78  Resp:  [17-20] 18  SpO2:  [95 %-98 %] 96 %  BP: (118-144)/(58-84) 138/78     Weight: 119.1 kg (262 lb 9.1 oz)  Body mass index is 43.69 kg/m².    Intake/Output Summary (Last 24 hours) at 9/11/2024 1317  Last data filed at 9/11/2024 0902  Gross per 24 hour   Intake 1650 ml   Output 500 ml   Net 1150 ml         Physical Exam  Constitutional:       Appearance: He is obese.   Cardiovascular:      Rate and Rhythm: Normal rate.      Pulses: Normal pulses.   Pulmonary:      Effort: Pulmonary effort is normal.   Abdominal:      General: Abdomen is flat. There is no distension.      Tenderness: There is no abdominal tenderness.   Skin:     Comments: Refer to imaging for wound   Neurological:      Mental Status: He is alert and oriented to person, place, and time.             Significant Labs: All pertinent labs within the past 24 hours have been reviewed.    Significant Imaging: I have reviewed all pertinent imaging results/findings within the past 24 hours.    Assessment/Plan:      * Ulcerative colitis  Use dexamethasone 18 mg daily in place of budesonide 6 mg while in-house   GI recommended conservative management  Holding rivaroxaban for now since FOBT +      Drug-seeking behavior  Concern for drug-seeking behavior  Patient has history of multiple admission with asking for Dilaudid during every admission  Patient's physical exam does not correspond to what is being reported for pain      Open wound of left upper arm  Consult wound care      Hypomagnesemia  Patient has Abnormal  "Magnesium: hypomagnesemia. Will continue to monitor electrolytes closely. Will replace the affected electrolytes and repeat labs to be done after interventions completed. The patient's magnesium results have been reviewed and are listed below.  Patient given Magnesium in ED   Most Recent Today 6 d ago 7 d ago 2 wk ago 2 wk ago 2 wk ago 2 wk ago   Chem Profile   Magnesium 1.3 Low  (Today) 1.3 Low  1.5 Low  1.2 Low  1.5 Low  1.5 Low  1.8 1.1 Low        Type 2 diabetes mellitus without complication, without long-term current use of insulin  Patient's FSGs are controlled on current medication regimen.  Last A1c reviewed-   Lab Results   Component Value Date    HGBA1C 5.2 06/06/2024     Most recent fingerstick glucose reviewed- No results for input(s): "POCTGLUCOSE" in the last 24 hours.  Current correctional scale  Low  Maintain anti-hyperglycemic dose as follows-   Antihyperglycemics (From admission, onward)      Start     Stop Route Frequency Ordered    09/10/24 2117  insulin aspart U-100 pen 0-10 Units         -- SubQ Before meals & nightly PRN 09/10/24 2018        Hold Oral hypoglycemics while patient is in the hospital.    Morbid obesity  Body mass index is 44.93 kg/m². Morbid obesity complicates all aspects of disease management from diagnostic modalities to treatment. Weight loss encouraged and health benefits explained to patient.           VTE Risk Mitigation (From admission, onward)           Ordered     Reason for No Pharmacological VTE Prophylaxis  Once        Question:  Reasons:  Answer:  Already adequately anticoagulated on oral Anticoagulants    09/10/24 2010     IP VTE HIGH RISK PATIENT  Once         09/10/24 2010     Place sequential compression device  Until discontinued         09/10/24 2010                    Discharge Planning   GERARDO: 9/14/2024     Code Status: Full Code   Is the patient medically ready for discharge?:     Reason for patient still in hospital (select all that apply): Patient trending " condition  Discharge Plan A: Home Health                  Matteo Melendez DO  Department of Hospital Medicine   [facility]

## 2024-09-11 NOTE — NURSING
Pt arrived to room 1216, pt in no distress, wound care performed and wound culture collected and sent to lab. Dr Moffett came to see pt for assessment. Pt request more pain meds as he states norco 5mg is not strong enough, pt also request ambien at hs. MD ordered ambien per pt request but did not want to increase pain meds at this time.

## 2024-09-11 NOTE — ASSESSMENT & PLAN NOTE
Use dexamethasone 18 mg daily in place of budesonide 6 mg while in-house   Zosyn continued  Consult GI   Hold Xarelto x 1 dose restart in a.m.

## 2024-09-11 NOTE — HPI
50 y/o male with pMHx of 2 DM, C difficile colitis, ulcerative colitis, cavitary pneumonia, HLD, and hypertension who presented to the ED with complaints of hematochezia and diarrhea since Thursday.  Patient reports that he has been having 10-15 bloody stools daily.  He is currently on budesonide 9 mg., Levsin 0.375 b.i.d., cholestyramine t.i.d., and Creon however patient never filled his cramps prescription.  Patient is with continued left upper arm site of previous I&D of abscess.  He follows outpatient with wound care for topical treatment.  On evaluation in the ED patient occult stool positive.  CO2 21, alk phos 52, magnesium 1.3, lipase 3, , hemoglobin 11.7, hematocrit 35.7.  CTA abdomen and pelvis with IV contrast with nonspecific colitis likely infectious or inflammatory in nature.  Nonobstructive left nephrolithiasis.  Cholecystectomy.  Patient will be admitted for continued IV antibiotics started in the ED with GI consult.

## 2024-09-12 LAB
ALBUMIN SERPL BCP-MCNC: 3.7 G/DL (ref 3.5–5.2)
ALP SERPL-CCNC: 55 U/L (ref 55–135)
ALT SERPL W/O P-5'-P-CCNC: 21 U/L (ref 10–44)
ANION GAP SERPL CALC-SCNC: 8 MMOL/L (ref 8–16)
AST SERPL-CCNC: 55 U/L (ref 10–40)
BASOPHILS # BLD AUTO: 0.01 K/UL (ref 0–0.2)
BASOPHILS NFR BLD: 0.1 % (ref 0–1.9)
BILIRUB SERPL-MCNC: 0.4 MG/DL (ref 0.1–1)
BUN SERPL-MCNC: 11 MG/DL (ref 6–20)
CALCIUM SERPL-MCNC: 9 MG/DL (ref 8.7–10.5)
CHLORIDE SERPL-SCNC: 100 MMOL/L (ref 95–110)
CO2 SERPL-SCNC: 27 MMOL/L (ref 23–29)
CREAT SERPL-MCNC: 1 MG/DL (ref 0.5–1.4)
DIFFERENTIAL METHOD BLD: ABNORMAL
EOSINOPHIL # BLD AUTO: 0 K/UL (ref 0–0.5)
EOSINOPHIL NFR BLD: 0 % (ref 0–8)
ERYTHROCYTE [DISTWIDTH] IN BLOOD BY AUTOMATED COUNT: 15.3 % (ref 11.5–14.5)
EST. GFR  (NO RACE VARIABLE): >60 ML/MIN/1.73 M^2
GLUCOSE SERPL-MCNC: 137 MG/DL (ref 70–110)
HCT VFR BLD AUTO: 37.5 % (ref 40–54)
HGB BLD-MCNC: 11.9 G/DL (ref 14–18)
IMM GRANULOCYTES # BLD AUTO: 0.07 K/UL (ref 0–0.04)
IMM GRANULOCYTES NFR BLD AUTO: 0.5 % (ref 0–0.5)
LYMPHOCYTES # BLD AUTO: 1 K/UL (ref 1–4.8)
LYMPHOCYTES NFR BLD: 7.2 % (ref 18–48)
MAGNESIUM SERPL-MCNC: 1.7 MG/DL (ref 1.6–2.6)
MCH RBC QN AUTO: 30.6 PG (ref 27–31)
MCHC RBC AUTO-ENTMCNC: 31.7 G/DL (ref 32–36)
MCV RBC AUTO: 96 FL (ref 82–98)
MONOCYTES # BLD AUTO: 0.9 K/UL (ref 0.3–1)
MONOCYTES NFR BLD: 6.4 % (ref 4–15)
NEUTROPHILS # BLD AUTO: 11.3 K/UL (ref 1.8–7.7)
NEUTROPHILS NFR BLD: 85.8 % (ref 38–73)
NRBC BLD-RTO: 0 /100 WBC
PLATELET # BLD AUTO: 227 K/UL (ref 150–450)
PMV BLD AUTO: 10.8 FL (ref 9.2–12.9)
POCT GLUCOSE: 103 MG/DL (ref 70–110)
POCT GLUCOSE: 148 MG/DL (ref 70–110)
POCT GLUCOSE: 172 MG/DL (ref 70–110)
POCT GLUCOSE: 178 MG/DL (ref 70–110)
POTASSIUM SERPL-SCNC: 3.8 MMOL/L (ref 3.5–5.1)
PROT SERPL-MCNC: 6.5 G/DL (ref 6–8.4)
RBC # BLD AUTO: 3.89 M/UL (ref 4.6–6.2)
SODIUM SERPL-SCNC: 135 MMOL/L (ref 136–145)
T4 FREE SERPL-MCNC: 1.17 NG/DL (ref 0.71–1.51)
TSH SERPL DL<=0.005 MIU/L-ACNC: 0.11 UIU/ML (ref 0.34–5.6)
WBC # BLD AUTO: 13.18 K/UL (ref 3.9–12.7)

## 2024-09-12 PROCEDURE — 25000003 PHARM REV CODE 250: Performed by: NURSE PRACTITIONER

## 2024-09-12 PROCEDURE — 83735 ASSAY OF MAGNESIUM: CPT | Performed by: NURSE PRACTITIONER

## 2024-09-12 PROCEDURE — 25000003 PHARM REV CODE 250: Performed by: INTERNAL MEDICINE

## 2024-09-12 PROCEDURE — 25000003 PHARM REV CODE 250: Performed by: FAMILY MEDICINE

## 2024-09-12 PROCEDURE — 12000002 HC ACUTE/MED SURGE SEMI-PRIVATE ROOM

## 2024-09-12 PROCEDURE — 63600175 PHARM REV CODE 636 W HCPCS: Performed by: HOSPITALIST

## 2024-09-12 PROCEDURE — 85025 COMPLETE CBC W/AUTO DIFF WBC: CPT | Performed by: NURSE PRACTITIONER

## 2024-09-12 PROCEDURE — 97116 GAIT TRAINING THERAPY: CPT

## 2024-09-12 PROCEDURE — 97165 OT EVAL LOW COMPLEX 30 MIN: CPT

## 2024-09-12 PROCEDURE — 84443 ASSAY THYROID STIM HORMONE: CPT | Performed by: NURSE PRACTITIONER

## 2024-09-12 PROCEDURE — 84439 ASSAY OF FREE THYROXINE: CPT | Performed by: NURSE PRACTITIONER

## 2024-09-12 PROCEDURE — 97162 PT EVAL MOD COMPLEX 30 MIN: CPT

## 2024-09-12 PROCEDURE — 80053 COMPREHEN METABOLIC PANEL: CPT | Performed by: NURSE PRACTITIONER

## 2024-09-12 PROCEDURE — 63600175 PHARM REV CODE 636 W HCPCS: Performed by: FAMILY MEDICINE

## 2024-09-12 PROCEDURE — 25000003 PHARM REV CODE 250: Performed by: HOSPITALIST

## 2024-09-12 PROCEDURE — 63600175 PHARM REV CODE 636 W HCPCS: Performed by: NURSE PRACTITIONER

## 2024-09-12 RX ORDER — HYDROMORPHONE HYDROCHLORIDE 1 MG/ML
0.5 INJECTION, SOLUTION INTRAMUSCULAR; INTRAVENOUS; SUBCUTANEOUS ONCE
Status: CANCELLED | OUTPATIENT
Start: 2024-09-12

## 2024-09-12 RX ORDER — HYDROMORPHONE HYDROCHLORIDE 1 MG/ML
0.5 INJECTION, SOLUTION INTRAMUSCULAR; INTRAVENOUS; SUBCUTANEOUS ONCE
Status: COMPLETED | OUTPATIENT
Start: 2024-09-12 | End: 2024-09-12

## 2024-09-12 RX ADMIN — CHOLESTYRAMINE LIGHT 4 GRAMS OF ANHYDROUS CHOLESTYRAMINE: 4 POWDER, FOR SUSPENSION ORAL at 09:09

## 2024-09-12 RX ADMIN — HYOSCYAMINE SULFATE 0.38 MG: 0.12 TABLET SUBLINGUAL at 09:09

## 2024-09-12 RX ADMIN — LORAZEPAM 0.5 MG: 0.5 TABLET ORAL at 10:09

## 2024-09-12 RX ADMIN — DEXAMETHASONE 18 MG: 4 TABLET ORAL at 10:09

## 2024-09-12 RX ADMIN — COLCHICINE 0.6 MG: 0.6 TABLET, FILM COATED ORAL at 09:09

## 2024-09-12 RX ADMIN — RIVAROXABAN 15 MG: 15 TABLET, FILM COATED ORAL at 04:09

## 2024-09-12 RX ADMIN — SERTRALINE HYDROCHLORIDE 50 MG: 50 TABLET ORAL at 09:09

## 2024-09-12 RX ADMIN — PANTOPRAZOLE SODIUM 40 MG: 40 TABLET, DELAYED RELEASE ORAL at 09:09

## 2024-09-12 RX ADMIN — LORAZEPAM 0.5 MG: 0.5 TABLET ORAL at 09:09

## 2024-09-12 RX ADMIN — HYDROMORPHONE HYDROCHLORIDE 0.5 MG: 0.5 INJECTION, SOLUTION INTRAMUSCULAR; INTRAVENOUS; SUBCUTANEOUS at 02:09

## 2024-09-12 RX ADMIN — ZOLPIDEM TARTRATE 10 MG: 5 TABLET, COATED ORAL at 10:09

## 2024-09-12 RX ADMIN — SERTRALINE HYDROCHLORIDE 100 MG: 50 TABLET ORAL at 09:09

## 2024-09-12 RX ADMIN — HYDROCODONE BITARTRATE AND ACETAMINOPHEN 1 TABLET: 5; 325 TABLET ORAL at 09:09

## 2024-09-12 RX ADMIN — ATORVASTATIN CALCIUM 10 MG: 10 TABLET, FILM COATED ORAL at 09:09

## 2024-09-12 RX ADMIN — HYDROCODONE BITARTRATE AND ACETAMINOPHEN 1 TABLET: 5; 325 TABLET ORAL at 01:09

## 2024-09-12 RX ADMIN — VANCOMYCIN HYDROCHLORIDE 2000 MG: 1 INJECTION, POWDER, LYOPHILIZED, FOR SOLUTION INTRAVENOUS at 09:09

## 2024-09-12 RX ADMIN — DIPHENOXYLATE HYDROCHLORIDE AND ATROPINE SULFATE 1 TABLET: 2.5; .025 TABLET ORAL at 02:09

## 2024-09-12 RX ADMIN — HYDROCODONE BITARTRATE AND ACETAMINOPHEN 1 TABLET: 5; 325 TABLET ORAL at 04:09

## 2024-09-12 RX ADMIN — DIPHENOXYLATE HYDROCHLORIDE AND ATROPINE SULFATE 1 TABLET: 2.5; .025 TABLET ORAL at 10:09

## 2024-09-12 RX ADMIN — ACETAMINOPHEN 650 MG: 325 TABLET ORAL at 10:09

## 2024-09-12 RX ADMIN — HYDROCODONE BITARTRATE AND ACETAMINOPHEN 1 TABLET: 5; 325 TABLET ORAL at 10:09

## 2024-09-12 RX ADMIN — METOPROLOL SUCCINATE 50 MG: 50 TABLET, FILM COATED, EXTENDED RELEASE ORAL at 09:09

## 2024-09-12 RX ADMIN — ONDANSETRON 4 MG: 2 INJECTION INTRAMUSCULAR; INTRAVENOUS at 02:09

## 2024-09-12 NOTE — PT/OT/SLP EVAL
Physical Therapy Evaluation and Discharge Note    Patient Name:  Jacoby Jean-Baptiste   MRN:  [id number]    Recommendations:     Discharge Recommendations: No Therapy Indicated  Discharge Equipment Recommendations: none   Barriers to discharge:  medical status    Assessment:     Jacoby Jean-Baptiste is a 49 y.o. male admitted with a medical diagnosis of Ulcerative colitis. .  At this time, patient is functioning at their prior level of function and does not require further acute PT services.     Recent Surgery: * No surgery found *      Plan:     During this hospitalization, patient does not require further acute PT services.  Please re-consult if situation changes.      Subjective     Chief Complaint: soreness in bilat LE, chronic pain in arm and abdomen  Patient/Family Comments/goals: get better and go home  Pain/Comfort:  Pain Rating 1: 8/10  Location - Side 1: Left  Location 1: arm  Pain Addressed 1: Reposition, Distraction  Pain Rating Post-Intervention 1: 8/10  Pain Rating 2: 8/10  Location - Orientation 2: midline  Location 2: abdomen  Pain Addressed 2: Reposition, Distraction  Pain Rating Post-Intervention 2: 8/10    Patients cultural, spiritual, Buddhist conflicts given the current situation:      Living Environment:  Pt lives alone first floor, no steps  Prior to admission, patients level of function was independent without AD.  Equipment used at home: none.  DME owned (not currently used): none.  Upon discharge, patient will have assistance from TBD.    Objective:     Communicated with RN, Paty, prior to session.  Patient found HOB elevated with telemetry, peripheral IV (bed alarm declined, sign on door) upon PT entry to room.    General Precautions: Standard, fall    Orthopedic Precautions:    Braces:    Respiratory Status: Room air    Exams:  Cognitive Exam:  Patient is oriented to Person, Place, Time, and Situation  Postural Exam:  Patient presented with the following abnormalities:    -       Rounded  shoulders  -       Forward head  Skin Integrity/Edema:      -       Skin integrity: Wound L medial upper arm  RLE Strength: 4/5  LLE Strength: 4/5    Functional Mobility:  Bed Mobility:     Supine to Sit: independence  Sit to Supine: independence  Transfers:     Sit to Stand:  independence with no AD  Toilet Transfer: independence with  no AD  using  Step Transfer  Gait: 2x150' w/o AD. Mild antalgic pattern but no LOB or attempts to use external support.    AM-PAC 6 CLICK MOBILITY  Total Score:23       Treatment and Education:  Pt was educated on the following: call light use, importance of OOB activity and functional mobility to negate the negative effects of prolonged bed rest during this hospitalization, safe transfers/ambulation and discharge planning recommendations/options.     AM-PAC 6 CLICK MOBILITY  Total Score:23     Patient left  sitting edge of bed  with all lines intact, call button in reach, RN notified, and direct handoff to OT who is present.    GOALS:   Multidisciplinary Problems       Physical Therapy Goals       Not on file                    History:     Past Medical History:   Diagnosis Date    C. difficile colitis     Cavitary pneumonia 11/2023    pos aspergillus serology    Diabetes mellitus 01/2022    Hyperlipidemia 2015    Hypertension 2015    Insomnia 2015    Ulcerative colitis        Past Surgical History:   Procedure Laterality Date    BRONCHOSCOPY WITH FLUOROSCOPY Left 11/20/2023    Procedure: BRONCHOSCOPY, WITH FLUOROSCOPY;  Surgeon: Lisa Villarreal MD;  Location: [facility] ENDO;  Service: Pulmonary;  Laterality: Left;    BRONCHOSCOPY WITH FLUOROSCOPY N/A 11/30/2023    Procedure: BRONCHOSCOPY, WITH FLUOROSCOPY;  Surgeon: Lisa Villarreal MD;  Location: [facility] ENDO;  Service: Pulmonary;  Laterality: N/A;    CARDIAC ELECTROPHYSIOLOGY MAPPING AND ABLATION  2017    SVT    CHOLECYSTECTOMY  2011    COLONOSCOPY N/A 5/3/2022    Procedure: COLONOSCOPY;  Surgeon: Shan Jean III, MD;   Location: [facility];  Service: Endoscopy;  Laterality: N/A;    COLONOSCOPY N/A 3/16/2024    Procedure: COLONOSCOPY;  Surgeon: ALEKSANDER Ramos MD;  Location: [facility];  Service: Endoscopy;  Laterality: N/A;    COLONOSCOPY WITH FECAL MICROBIOTA TRANSFER N/A 3/21/2023    Procedure: COLONOSCOPY, WITH FECAL MICROBIOTA TRANSFER;  Surgeon: David Millan MD;  Location: [facility];  Service: Endoscopy;  Laterality: N/A;    ESOPHAGOGASTRODUODENOSCOPY N/A 4/24/2023    Procedure: EGD (ESOPHAGOGASTRODUODENOSCOPY);  Surgeon: Shan Jean III, MD;  Location: [facility];  Service: Endoscopy;  Laterality: N/A;    ESOPHAGOGASTRODUODENOSCOPY N/A 6/5/2024    Procedure: EGD (ESOPHAGOGASTRODUODENOSCOPY);  Surgeon: Odalis Mccabe MD;  Location: [facility];  Service: Endoscopy;  Laterality: N/A;    FLEXIBLE SIGMOIDOSCOPY N/A 6/5/2024    Procedure: SIGMOIDOSCOPY, FLEXIBLE;  Surgeon: Odalis Mccabe MD;  Location: [facility];  Service: Endoscopy;  Laterality: N/A;    FLEXIBLE SIGMOIDOSCOPY N/A 7/16/2024    Procedure: SIGMOIDOSCOPY, FLEXIBLE;  Surgeon: Shan Jean III, MD;  Location: [facility];  Service: Endoscopy;  Laterality: N/A;    FLEXIBLE SIGMOIDOSCOPY N/A 8/13/2024    Procedure: SIGMOIDOSCOPY, FLEXIBLE;  Surgeon: Shan Jean III, MD;  Location: [facility];  Service: Endoscopy;  Laterality: N/A;    GANGLION CYST EXCISION Left 1992    UMBILICAL HERNIA REPAIR  2011    with mesh       Time Tracking:     PT Received On: 09/12/24  PT Start Time: 1319     PT Stop Time: 1337  PT Total Time (min): 18 min     Billable Minutes: Evaluation 8 and Gait Training 10      09/12/2024

## 2024-09-12 NOTE — CONSULTS
Chief complaint:  Abdominal Pain (Complains bright red blood BM's, began last Thursday.  Hx of UC.  )      HPI:  Jacoby Jean-Baptiste is a 49 y.o. male presenting with an open wound of the left shoulder from an abscess that was I and D'ed. Wound is clean, packing clear drainage, no other complaints today    PMH:  As per HPI and below:  Past Medical History:   Diagnosis Date    C. difficile colitis     Cavitary pneumonia 11/2023    pos aspergillus serology    Diabetes mellitus 01/2022    Hyperlipidemia 2015    Hypertension 2015    Insomnia 2015    Ulcerative colitis        Social History     Socioeconomic History    Marital status: Single   Tobacco Use    Smoking status: Former     Current packs/day: 1.00     Average packs/day: 1 pack/day for 31.7 years (31.7 ttl pk-yrs)     Types: Cigarettes     Start date: 1993    Smokeless tobacco: Never    Tobacco comments:     Pt states he has stopped smoking with Chantix three weeks ago. 12/1/23   Substance and Sexual Activity    Alcohol use: Yes     Comment: ocass    Drug use: Not Currently    Sexual activity: Not Currently     Social Determinants of Health     Financial Resource Strain: Low Risk  (9/11/2024)    Overall Financial Resource Strain (CARDIA)     Difficulty of Paying Living Expenses: Not hard at all   Food Insecurity: No Food Insecurity (9/11/2024)    Hunger Vital Sign     Worried About Running Out of Food in the Last Year: Never true     Ran Out of Food in the Last Year: Never true   Transportation Needs: No Transportation Needs (9/11/2024)    TRANSPORTATION NEEDS     Transportation : No   Physical Activity: Sufficiently Active (9/11/2024)    Exercise Vital Sign     Days of Exercise per Week: 5 days     Minutes of Exercise per Session: 30 min   Recent Concern: Physical Activity - Inactive (8/10/2024)    Exercise Vital Sign     Days of Exercise per Week: 0 days     Minutes of Exercise per Session: 0 min   Stress: No Stress Concern Present (9/11/2024)    [employer] - Occupational Stress Questionnaire     Feeling of Stress : Not at all   Housing Stability: Low Risk  (9/11/2024)    Housing Stability Vital Sign     Unable to Pay for Housing in the Last Year: No     Homeless in the Last Year: No       Past Surgical History:   Procedure Laterality Date    BRONCHOSCOPY WITH FLUOROSCOPY Left 11/20/2023    Procedure: BRONCHOSCOPY, WITH FLUOROSCOPY;  Surgeon: Lisa Villarreal MD;  Location: [facility];  Service: Pulmonary;  Laterality: Left;    BRONCHOSCOPY WITH FLUOROSCOPY N/A 11/30/2023    Procedure: BRONCHOSCOPY, WITH FLUOROSCOPY;  Surgeon: Lisa Villarreal MD;  Location: [facility];  Service: Pulmonary;  Laterality: N/A;    CARDIAC ELECTROPHYSIOLOGY MAPPING AND ABLATION  2017    SVT    CHOLECYSTECTOMY  2011    COLONOSCOPY N/A 5/3/2022    Procedure: COLONOSCOPY;  Surgeon: Shan Jean III, MD;  Location: [facility];  Service: Endoscopy;  Laterality: N/A;    COLONOSCOPY N/A 3/16/2024    Procedure: COLONOSCOPY;  Surgeon: ALEKSANDER Ramos MD;  Location: [facility];  Service: Endoscopy;  Laterality: N/A;    COLONOSCOPY WITH FECAL MICROBIOTA TRANSFER N/A 3/21/2023    Procedure: COLONOSCOPY, WITH FECAL MICROBIOTA TRANSFER;  Surgeon: David Millan MD;  Location: [facility];  Service: Endoscopy;  Laterality: N/A;    ESOPHAGOGASTRODUODENOSCOPY N/A 4/24/2023    Procedure: EGD (ESOPHAGOGASTRODUODENOSCOPY);  Surgeon: Shan Jean III, MD;  Location: [facility];  Service: Endoscopy;  Laterality: N/A;    ESOPHAGOGASTRODUODENOSCOPY N/A 6/5/2024    Procedure: EGD (ESOPHAGOGASTRODUODENOSCOPY);  Surgeon: Odalis Mccabe MD;  Location: [facility];  Service: Endoscopy;  Laterality: N/A;    FLEXIBLE SIGMOIDOSCOPY N/A 6/5/2024    Procedure: SIGMOIDOSCOPY, FLEXIBLE;  Surgeon: Odalis Mccabe MD;  Location: [facility];  Service: Endoscopy;  Laterality: N/A;    FLEXIBLE SIGMOIDOSCOPY N/A 7/16/2024    Procedure: SIGMOIDOSCOPY, FLEXIBLE;  Surgeon:  Shan Jean III, MD;  Location: [facility];  Service: Endoscopy;  Laterality: N/A;    FLEXIBLE SIGMOIDOSCOPY N/A 8/13/2024    Procedure: SIGMOIDOSCOPY, FLEXIBLE;  Surgeon: Shan Jean III, MD;  Location: [facility];  Service: Endoscopy;  Laterality: N/A;    GANGLION CYST EXCISION Left 1992    UMBILICAL HERNIA REPAIR  2011    with mesh       Family History   Problem Relation Name Age of Onset    Asthma Mother         Review of patient's allergies indicates:  No Known Allergies    Current Facility-Administered Medications on File Prior to Encounter   Medication Dose Route Frequency Provider Last Rate Last Admin    lactated ringers infusion   Intravenous Continuous David Millan MD 75 mL/hr at 03/21/23 1252 New Bag at 03/21/23 1252     Current Outpatient Medications on File Prior to Encounter   Medication Sig Dispense Refill    budesonide (ENTOCORT EC) 3 mg capsule Take 3 capsules (9 mg total) by mouth once daily. 90 capsule 0    busPIRone (BUSPAR) 15 MG tablet Take 1 tablet (15 mg total) by mouth 3 (three) times daily. 90 tablet 5    cholestyramine (QUESTRAN) 4 gram packet Take 1 packet by mouth 3 (three) times daily.      ciprofloxacin HCl (CIPRO) 500 MG tablet Take 1 tablet (500 mg total) by mouth 2 (two) times daily. for 10 days (Patient taking differently: Take 500 mg by mouth 2 (two) times daily. NEW Rx - NOT YET STARTED) 20 tablet 0    colchicine (COLCRYS) 0.6 mg tablet Take 1 tablet (0.6 mg total) by mouth 2 (two) times daily. 180 tablet 0    ergocalciferol (VITAMIN D2) 50,000 unit Cap Take 1 capsule (50,000 Units total) by mouth every 7 days. (Patient taking differently: Take 50,000 Units by mouth every 7 days. FRIDAYS) 12 capsule 1    hyoscyamine (LEVBID) 0.375 mg Tb12 Take 375 mcg by mouth 2 (two) times daily.      LORazepam (ATIVAN) 0.5 MG tablet Take 1 tablet (0.5 mg total) by mouth every 6 (six) hours as needed for Anxiety. 90 tablet 5    metFORMIN (GLUCOPHAGE-XR) 500 MG ER 24hr tablet  Take 1 tablet (500 mg total) by mouth 2 (two) times daily with meals. 180 tablet 1    metoprolol succinate (TOPROL-XL) 50 MG 24 hr tablet Take 1 tablet (50 mg total) by mouth once daily. 90 tablet 1    pantoprazole (PROTONIX) 40 MG tablet Take 40 mg by mouth 2 (two) times daily.      promethazine (PHENERGAN) 12.5 MG Tab Take 12.5 mg by mouth 4 (four) times daily as needed.      rivaroxaban (XARELTO) 15 mg Tab Take 1 tablet (15 mg total) by mouth 2 (two) times daily with meals. for 21 days 42 tablet 0    semaglutide (OZEMPIC) 2 mg/dose (8 mg/3 mL) PnIj Inject 2 mg into the skin every 7 days. (Patient taking differently: Inject 2 mg into the skin every 7 days. Mondays) 3 mL 5    sertraline (ZOLOFT) 100 MG tablet Take 1 tablet (100 mg total) by mouth once daily. (Patient taking differently: Take 100 mg by mouth once daily. 150 mg Total) 90 tablet 1    sertraline (ZOLOFT) 50 MG tablet Take 1 tablet (50 mg total) by mouth once daily. (Patient taking differently: Take 50 mg by mouth once daily. 150 mg Total) 90 tablet 1    simvastatin (ZOCOR) 20 MG tablet Take 1 tablet (20 mg total) by mouth every evening. (Patient taking differently: Take 20 mg by mouth once daily.) 90 tablet 1    zolpidem (AMBIEN) 10 mg Tab Take 1 tablet (10 mg total) by mouth nightly as needed (insomnia). 30 tablet 2    lipase-protease-amylase 12,000-38,000-60,000 units (CREON) CpDR Take 3 capsules by mouth 3 (three) times daily with meals. (Patient not taking: Reported on 9/10/2024) 270 capsule 11    rivaroxaban (XARELTO) 20 mg Tab Take 1 tablet (20 mg total) by mouth daily with dinner or evening meal. (Patient taking differently: Take 20 mg by mouth daily with dinner or evening meal. NEW Rx - NOT YET STARTED) 90 tablet 1       ROS: As per HPI and below:  Pertinent items are noted in HPI.      Physical Exam:     Vitals:    09/12/24 0326 09/12/24 0714 09/12/24 0919 09/12/24 0920   BP: (!) 154/71 (!) 156/70 (!) 156/70    Pulse: 85 84     Resp: 18 19 18  "  Temp: 97.7 °F (36.5 °C) 97.9 °F (36.6 °C)     TempSrc: Oral      SpO2: (!) 91% 98%     Weight:       Height:           BP  Min: 114/81  Max: 156/70  Temp  Av.9 °F (36.6 °C)  Min: 97.5 °F (36.4 °C)  Max: 98.2 °F (36.8 °C)  Pulse  Av  Min: 69  Max: 96  Resp  Av.1  Min: 16  Max: 20  SpO2  Av.8 %  Min: 91 %  Max: 100 %  Height  Av' 5" (165.1 cm)  Min: 5' 5" (165.1 cm)  Max: 5' 5" (165.1 cm)  Weight  Av.8 kg (266 lb 4.5 oz)  Min: 119.1 kg (262 lb 9.1 oz)  Max: 122.5 kg (270 lb)    Body mass index is 43.69 kg/m².          General:             Well developed, well nourished, no apparent distress  HEENT:              NCAT, no JVD, mucous membranes moist, EOM intact  Cardiovascular:  Regular rate and rhythm, normal S1, normal S2, No murmurs, rubs, or gallops  Respiratory:        Normal breath sounds, no wheezes, no rales, no rhonchi  Abdomen:           Bowel sounds present, non tender, non distended, no masses, no hepatojugular reflux  Extremities:        No clubbing, no cyanosis, no edema  Vascular:            2+ b/l radial.  Peripheral pulses intact.  No carotid bruits.  Neurological:      No focal deficits  Skin:                   No obvious rashes or erythema, left shoulder open wound from abscess that was I and D'ed      Lab Results   Component Value Date    WBC 7.25 2024    HGB 12.1 (L) 2024    HCT 37.9 (L) 2024    MCV 97 2024     2024     Lab Results   Component Value Date    CHOL 142 10/27/2023    CHOL 103 (L) 2023    CHOL 111 (L) 2022     Lab Results   Component Value Date    HDL 44 10/27/2023    HDL 41 2023    HDL 28 (L) 2022     Lab Results   Component Value Date    LDLCALC 81.4 10/27/2023    LDLCALC 44.8 (L) 2023    LDLCALC 58.4 (L) 2022     Lab Results   Component Value Date    TRIG 83 10/27/2023    TRIG 86 2023    TRIG 123 2022     Lab Results   Component Value Date    CHOLHDL 31.0 10/27/2023    " CHOLHDL 39.8 01/04/2023    CHOLHDL 25.2 06/07/2022     CMP  Recent Labs   Lab 09/11/24  1047   GLU 85   CALCIUM 8.9   ALBUMIN 3.8   PROT 6.6   *   K 4.2   CO2 26      BUN 8   CREATININE 0.9   ALKPHOS 60   ALT 14   AST 24   BILITOT 0.5      Lab Results   Component Value Date    TSH 0.358 09/10/2024         Assessment and Recommendations       Diagnoses:    1. Left shoulder abscess s/p I and D    Plan:  1. Continue packing  2. Pt to FU OP on DC    Complexity:    moderate

## 2024-09-12 NOTE — SUBJECTIVE & OBJECTIVE
Interval History: Patient said he feels better but still in pain    Review of Systems   All other systems reviewed and are negative.    Objective:     Vital Signs (Most Recent):  Temp: 97.9 °F (36.6 °C) (09/12/24 0714)  Pulse: 84 (09/12/24 0714)  Resp: 18 (09/12/24 0920)  BP: (!) 156/70 (09/12/24 0919)  SpO2: 98 % (09/12/24 0714) Vital Signs (24h Range):  Temp:  [97.5 °F (36.4 °C)-98.2 °F (36.8 °C)] 97.9 °F (36.6 °C)  Pulse:  [81-90] 84  Resp:  [18-19] 18  SpO2:  [91 %-98 %] 98 %  BP: (144-156)/(66-89) 156/70     Weight: 119.1 kg (262 lb 9.1 oz)  Body mass index is 43.69 kg/m².    Intake/Output Summary (Last 24 hours) at 9/12/2024 1346  Last data filed at 9/12/2024 0615  Gross per 24 hour   Intake 1330 ml   Output 350 ml   Net 980 ml         Physical Exam  Constitutional:       Appearance: He is obese.   Cardiovascular:      Rate and Rhythm: Normal rate.      Pulses: Normal pulses.   Pulmonary:      Effort: Pulmonary effort is normal.   Abdominal:      General: Abdomen is flat. There is no distension.      Tenderness: There is no abdominal tenderness.   Skin:     Comments: Refer to imaging for wound   Neurological:      Mental Status: He is alert and oriented to person, place, and time.             Significant Labs: All pertinent labs within the past 24 hours have been reviewed.    Significant Imaging: I have reviewed all pertinent imaging results/findings within the past 24 hours.

## 2024-09-12 NOTE — ASSESSMENT & PLAN NOTE
Use dexamethasone 18 mg daily in place of budesonide 6 mg while in-house   GI recommended conservative management   Hgb is stable so restarted home rivaroxaban on 9/15 for chronic DVT of RUE.

## 2024-09-12 NOTE — PT/OT/SLP EVAL
Occupational Therapy   Evaluation and Discharge Note    Name: Jacoby Jean-Baptiste  MRN: [id number]  Admitting Diagnosis: Ulcerative colitis  Recent Surgery: * No surgery found *      Recommendations:     Discharge Recommendations: No Therapy Indicated  Discharge Equipment Recommendations: none  Barriers to discharge:  None    Assessment:     Jacoby Jean-Baptiste is a 49 y.o. male with a medical diagnosis of Ulcerative colitis. At this time, patient is functioning at their prior level of function and does not require further acute OT services.     Plan:     During this hospitalization, patient does not require further acute OT services.  Please re-consult if situation changes.    Plan of Care Reviewed with: patient    Subjective     Chief Complaint: none stated  Patient/Family Comments/goals: none stated    Occupational Profile:  Living Environment: Pt lives alone in a 1 story home with 2 GENOVEVA. Pt has both a WIS and a tub/shower combo  Previous level of function: Independent with ADLs, IADLs, and mobility  Roles and Routines: primary homemaker  Equipment Used at home: none  Assistance upon Discharge: unknown    Pain/Comfort:  Pain Rating 1:  (not rated)  Location - Side 1: Left  Location 1: arm  Pain Addressed 1: Distraction    Patients cultural, spiritual, Restoration conflicts given the current situation:      Objective:     Communicated with: nursing prior to session.  Patient found HOB elevated with telemetry, peripheral IV upon OT entry to room.    General Precautions: Standard, fall  Orthopedic Precautions: N/A  Braces: N/A  Respiratory Status: Room air     Occupational Performance:    Bed Mobility:    Patient completed Sit to Supine with independence    Functional Mobility/Transfers:  Patient completed Sit <> Stand Transfer with independence  with  no assistive device   Functional Mobility: pt amb in room with supervision with no AD, no LOB, no SOB    Activities of Daily Living:  Feeding:  independence    Grooming:  "independence    Lower Body Dressing: independence to do/doff flip-flops; pt states he never wears socks and "doesn't even own a pair"  Toileting: independence per pt    Cognitive/Visual Perceptual:  Cognitive/Psychosocial Skills:     -       Oriented to: Person, Place, Time, and Situation   -       Follows Commands/attention:Follows two-step commands  -       Communication: clear/fluent  -       Memory: No Deficits noted  -       Safety awareness/insight to disability: intact   -       Mood/Affect/Coping skills/emotional control: Appropriate to situation, Cooperative, and Pleasant    Physical Exam:  Balance:    -       Good seated/standing balance  Upper Extremity Range of Motion:     -       Right Upper Extremity: WFL  -       Left Upper Extremity: WFL  Upper Extremity Strength:    -       Right Upper Extremity: WFL  -       Left Upper Extremity: WFL   Strength:    -       Right Upper Extremity: WFL  -       Left Upper Extremity: WFL  Fine Motor Coordination:    -       Intact  Gross motor coordination:   WFL    AMPAC 6 Click ADL:  AMPAC Total Score: 24    Treatment & Education:  Pt educated on role of OT/POC, importance of OOB/EOB activity, use of call bell, and safety during ADLs, transfers, and functional mobility.    Patient left HOB elevated with all lines intact and call button in reach    GOALS:   Multidisciplinary Problems       Occupational Therapy Goals       Not on file                    History:     Past Medical History:   Diagnosis Date    C. difficile colitis     Cavitary pneumonia 11/2023    pos aspergillus serology    Diabetes mellitus 01/2022    Hyperlipidemia 2015    Hypertension 2015    Insomnia 2015    Ulcerative colitis          Past Surgical History:   Procedure Laterality Date    BRONCHOSCOPY WITH FLUOROSCOPY Left 11/20/2023    Procedure: BRONCHOSCOPY, WITH FLUOROSCOPY;  Surgeon: Lisa Villarreal MD;  Location: [facility];  Service: Pulmonary;  Laterality: Left;    BRONCHOSCOPY WITH " FLUOROSCOPY N/A 11/30/2023    Procedure: BRONCHOSCOPY, WITH FLUOROSCOPY;  Surgeon: Lisa Villarreal MD;  Location: [facility];  Service: Pulmonary;  Laterality: N/A;    CARDIAC ELECTROPHYSIOLOGY MAPPING AND ABLATION  2017    SVT    CHOLECYSTECTOMY  2011    COLONOSCOPY N/A 5/3/2022    Procedure: COLONOSCOPY;  Surgeon: Shan Jean III, MD;  Location: [facility];  Service: Endoscopy;  Laterality: N/A;    COLONOSCOPY N/A 3/16/2024    Procedure: COLONOSCOPY;  Surgeon: ALEKSANDER Ramos MD;  Location: [facility];  Service: Endoscopy;  Laterality: N/A;    COLONOSCOPY WITH FECAL MICROBIOTA TRANSFER N/A 3/21/2023    Procedure: COLONOSCOPY, WITH FECAL MICROBIOTA TRANSFER;  Surgeon: David Millan MD;  Location: [facility];  Service: Endoscopy;  Laterality: N/A;    ESOPHAGOGASTRODUODENOSCOPY N/A 4/24/2023    Procedure: EGD (ESOPHAGOGASTRODUODENOSCOPY);  Surgeon: Shan Jean III, MD;  Location: [facility];  Service: Endoscopy;  Laterality: N/A;    ESOPHAGOGASTRODUODENOSCOPY N/A 6/5/2024    Procedure: EGD (ESOPHAGOGASTRODUODENOSCOPY);  Surgeon: Odalis Mccabe MD;  Location: [facility];  Service: Endoscopy;  Laterality: N/A;    FLEXIBLE SIGMOIDOSCOPY N/A 6/5/2024    Procedure: SIGMOIDOSCOPY, FLEXIBLE;  Surgeon: Odalis Mccabe MD;  Location: [facility];  Service: Endoscopy;  Laterality: N/A;    FLEXIBLE SIGMOIDOSCOPY N/A 7/16/2024    Procedure: SIGMOIDOSCOPY, FLEXIBLE;  Surgeon: Shan Jean III, MD;  Location: [facility];  Service: Endoscopy;  Laterality: N/A;    FLEXIBLE SIGMOIDOSCOPY N/A 8/13/2024    Procedure: SIGMOIDOSCOPY, FLEXIBLE;  Surgeon: Shan Jean III, MD;  Location: [facility];  Service: Endoscopy;  Laterality: N/A;    GANGLION CYST EXCISION Left 1992    UMBILICAL HERNIA REPAIR  2011    with mesh       Time Tracking:     OT Date of Treatment: 09/12/24  OT Start Time: 1336  OT Stop Time: 1344  OT Total Time (min): 8 min    Billable Minutes:Evaluation 8    9/12/2024

## 2024-09-12 NOTE — PROGRESS NOTES
[facility] Medicine  Progress Note    Patient Name: Jacoby Jean-Baptiste  MRN: [id number]  Patient Class: IP- Inpatient   Admission Date: 9/10/2024  Length of Stay: 2 days  Attending Physician: Matteo Melendez DO  Primary Care Provider: Hunter Aguilar III, MD        Subjective:     Principal Problem:Ulcerative colitis        HPI:  48 y/o male with pMHx of 2 DM, C difficile colitis, ulcerative colitis, cavitary pneumonia, HLD, and hypertension who presented to the ED with complaints of hematochezia and diarrhea since Thursday.  Patient reports that he has been having 10-15 bloody stools daily.  He is currently on budesonide 9 mg., Levsin 0.375 b.i.d., cholestyramine t.i.d., and Creon however patient never filled his cramps prescription.  Patient is with continued left upper arm site of previous I&D of abscess.  He follows outpatient with wound care for topical treatment.  On evaluation in the ED patient occult stool positive.  CO2 21, alk phos 52, magnesium 1.3, lipase 3, , hemoglobin 11.7, hematocrit 35.7.  CTA abdomen and pelvis with IV contrast with nonspecific colitis likely infectious or inflammatory in nature.  Nonobstructive left nephrolithiasis.  Cholecystectomy.  Patient will be admitted for continued IV antibiotics started in the ED with GI consult.    Overview/Hospital Course:  48 y/o male with pMHx of 2 DM, C difficile colitis, DVT, ulcerative colitis, cavitary pneumonia, HLD, and hypertension who presented to the ED with complaints of hematochezia and diarrhea since Thursday and admitted for UC flare. Stool and c.diff study were neg. GI is following patient and recommended conservative management for pain control and continue with steroids. Hgb is stable so restarted home rivaroxaban on 9/15 for chronic DVT of RUE. Concern for drug-seeking behavior. Wound care is following for chronic left shoulder abscess. Patient has history of multiple admission with asking for Dilaudid  during every admission.Patient's physical exam does not correspond to what is being reported for pain      Interval History: Patient said he feels better but still in pain    Review of Systems   All other systems reviewed and are negative.    Objective:     Vital Signs (Most Recent):  Temp: 97.9 °F (36.6 °C) (09/12/24 0714)  Pulse: 84 (09/12/24 0714)  Resp: 18 (09/12/24 0920)  BP: (!) 156/70 (09/12/24 0919)  SpO2: 98 % (09/12/24 0714) Vital Signs (24h Range):  Temp:  [97.5 °F (36.4 °C)-98.2 °F (36.8 °C)] 97.9 °F (36.6 °C)  Pulse:  [81-90] 84  Resp:  [18-19] 18  SpO2:  [91 %-98 %] 98 %  BP: (144-156)/(66-89) 156/70     Weight: 119.1 kg (262 lb 9.1 oz)  Body mass index is 43.69 kg/m².    Intake/Output Summary (Last 24 hours) at 9/12/2024 1346  Last data filed at 9/12/2024 0615  Gross per 24 hour   Intake 1330 ml   Output 350 ml   Net 980 ml         Physical Exam  Constitutional:       Appearance: He is obese.   Cardiovascular:      Rate and Rhythm: Normal rate.      Pulses: Normal pulses.   Pulmonary:      Effort: Pulmonary effort is normal.   Abdominal:      General: Abdomen is flat. There is no distension.      Tenderness: There is no abdominal tenderness.   Skin:     Comments: Refer to imaging for wound   Neurological:      Mental Status: He is alert and oriented to person, place, and time.             Significant Labs: All pertinent labs within the past 24 hours have been reviewed.    Significant Imaging: I have reviewed all pertinent imaging results/findings within the past 24 hours.    Assessment/Plan:      * Ulcerative colitis  Use dexamethasone 18 mg daily in place of budesonide 6 mg while in-house   GI recommended conservative management   Hgb is stable so restarted home rivaroxaban on 9/15 for chronic DVT of RUE.      Drug-seeking behavior  Concern for drug-seeking behavior  Patient has history of multiple admission with asking for Dilaudid during every admission  Patient's physical exam does not correspond  "to what is being reported for pain      Open wound of left upper arm  Wound care is following for chronic left shoulder abscess.     Hypomagnesemia  Patient has Abnormal Magnesium: hypomagnesemia. Will continue to monitor electrolytes closely. Will replace the affected electrolytes and repeat labs to be done after interventions completed. The patient's magnesium results have been reviewed and are listed below.  Patient given Magnesium in ED   Most Recent Today 6 d ago 7 d ago 2 wk ago 2 wk ago 2 wk ago 2 wk ago   Chem Profile   Magnesium 1.3 Low  (Today) 1.3 Low  1.5 Low  1.2 Low  1.5 Low  1.5 Low  1.8 1.1 Low        Type 2 diabetes mellitus without complication, without long-term current use of insulin  Patient's FSGs are controlled on current medication regimen.  Last A1c reviewed-   Lab Results   Component Value Date    HGBA1C 5.2 06/06/2024     Most recent fingerstick glucose reviewed- No results for input(s): "POCTGLUCOSE" in the last 24 hours.  Current correctional scale  Low  Maintain anti-hyperglycemic dose as follows-   Antihyperglycemics (From admission, onward)      Start     Stop Route Frequency Ordered    09/10/24 2117  insulin aspart U-100 pen 0-10 Units         -- SubQ Before meals & nightly PRN 09/10/24 2018        Hold Oral hypoglycemics while patient is in the hospital.    Morbid obesity  Body mass index is 44.93 kg/m². Morbid obesity complicates all aspects of disease management from diagnostic modalities to treatment. Weight loss encouraged and health benefits explained to patient.           VTE Risk Mitigation (From admission, onward)           Ordered     rivaroxaban tablet 15 mg  2 times daily with meals         09/12/24 1119     Reason for No Pharmacological VTE Prophylaxis  Once        Question:  Reasons:  Answer:  Already adequately anticoagulated on oral Anticoagulants    09/10/24 2010     IP VTE HIGH RISK PATIENT  Once         09/10/24 2010     Place sequential compression device  Until " discontinued         09/10/24 2010                    Discharge Planning   GERARDO: 9/13/2024     Code Status: Full Code   Is the patient medically ready for discharge?:     Reason for patient still in hospital (select all that apply): Patient trending condition  Discharge Plan A: Home Health                  Matteo Melendez DO  Department of Hospital Medicine   [facility]

## 2024-09-13 ENCOUNTER — DOCUMENT SCAN (OUTPATIENT)
Dept: HOME HEALTH SERVICES | Facility: HOSPITAL | Age: 49
End: 2024-09-13
Payer: COMMERCIAL

## 2024-09-13 VITALS
WEIGHT: 262.56 LBS | RESPIRATION RATE: 18 BRPM | BODY MASS INDEX: 43.75 KG/M2 | HEART RATE: 73 BPM | SYSTOLIC BLOOD PRESSURE: 146 MMHG | OXYGEN SATURATION: 97 % | HEIGHT: 65 IN | DIASTOLIC BLOOD PRESSURE: 76 MMHG | TEMPERATURE: 98 F

## 2024-09-13 LAB
ALBUMIN SERPL BCP-MCNC: 3.6 G/DL (ref 3.5–5.2)
ALP SERPL-CCNC: 50 U/L (ref 55–135)
ALT SERPL W/O P-5'-P-CCNC: 24 U/L (ref 10–44)
ANION GAP SERPL CALC-SCNC: 8 MMOL/L (ref 8–16)
AST SERPL-CCNC: 38 U/L (ref 10–40)
BACTERIA SPEC ANAEROBE CULT: NORMAL
BACTERIA STL CULT: NORMAL
BACTERIA STL CULT: NORMAL
BASOPHILS # BLD AUTO: 0.02 K/UL (ref 0–0.2)
BASOPHILS NFR BLD: 0.1 % (ref 0–1.9)
BILIRUB SERPL-MCNC: 0.3 MG/DL (ref 0.1–1)
BUN SERPL-MCNC: 18 MG/DL (ref 6–20)
CALCIUM SERPL-MCNC: 8.7 MG/DL (ref 8.7–10.5)
CHLORIDE SERPL-SCNC: 102 MMOL/L (ref 95–110)
CO2 SERPL-SCNC: 27 MMOL/L (ref 23–29)
CREAT SERPL-MCNC: 1 MG/DL (ref 0.5–1.4)
DIFFERENTIAL METHOD BLD: ABNORMAL
EOSINOPHIL # BLD AUTO: 0 K/UL (ref 0–0.5)
EOSINOPHIL NFR BLD: 0 % (ref 0–8)
ERYTHROCYTE [DISTWIDTH] IN BLOOD BY AUTOMATED COUNT: 15.3 % (ref 11.5–14.5)
EST. GFR  (NO RACE VARIABLE): >60 ML/MIN/1.73 M^2
GLUCOSE SERPL-MCNC: 172 MG/DL (ref 70–110)
HBV CORE AB SERPL QL IA: NEGATIVE
HBV SURFACE AB SER QL: NON REACTIVE
HCT VFR BLD AUTO: 35.3 % (ref 40–54)
HGB BLD-MCNC: 11.3 G/DL (ref 14–18)
IGM SERPL-MCNC: 88 MG/DL (ref 20–172)
IMM GRANULOCYTES # BLD AUTO: 0.2 K/UL (ref 0–0.04)
IMM GRANULOCYTES NFR BLD AUTO: 1.3 % (ref 0–0.5)
LYMPHOCYTES # BLD AUTO: 0.9 K/UL (ref 1–4.8)
LYMPHOCYTES NFR BLD: 6 % (ref 18–48)
MAGNESIUM SERPL-MCNC: 1.7 MG/DL (ref 1.6–2.6)
MCH RBC QN AUTO: 30.5 PG (ref 27–31)
MCHC RBC AUTO-ENTMCNC: 32 G/DL (ref 32–36)
MCV RBC AUTO: 95 FL (ref 82–98)
MONOCYTES # BLD AUTO: 1 K/UL (ref 0.3–1)
MONOCYTES NFR BLD: 6.9 % (ref 4–15)
NEUTROPHILS # BLD AUTO: 13 K/UL (ref 1.8–7.7)
NEUTROPHILS NFR BLD: 85.7 % (ref 38–73)
NRBC BLD-RTO: 0 /100 WBC
OHS QRS DURATION: 76 MS
OHS QTC CALCULATION: 446 MS
PLATELET # BLD AUTO: 240 K/UL (ref 150–450)
PMV BLD AUTO: 10.7 FL (ref 9.2–12.9)
POCT GLUCOSE: 140 MG/DL (ref 70–110)
POTASSIUM SERPL-SCNC: 4.6 MMOL/L (ref 3.5–5.1)
PROT SERPL-MCNC: 6.2 G/DL (ref 6–8.4)
RBC # BLD AUTO: 3.7 M/UL (ref 4.6–6.2)
SODIUM SERPL-SCNC: 137 MMOL/L (ref 136–145)
VANCOMYCIN SERPL-MCNC: 42.7 UG/ML
VANCOMYCIN TROUGH SERPL-MCNC: 35.2 UG/ML
WBC # BLD AUTO: 15.13 K/UL (ref 3.9–12.7)

## 2024-09-13 PROCEDURE — 25000003 PHARM REV CODE 250: Performed by: FAMILY MEDICINE

## 2024-09-13 PROCEDURE — 25000003 PHARM REV CODE 250: Performed by: NURSE PRACTITIONER

## 2024-09-13 PROCEDURE — 80202 ASSAY OF VANCOMYCIN: CPT | Performed by: FAMILY MEDICINE

## 2024-09-13 PROCEDURE — 80053 COMPREHEN METABOLIC PANEL: CPT | Performed by: NURSE PRACTITIONER

## 2024-09-13 PROCEDURE — 25000003 PHARM REV CODE 250: Performed by: INTERNAL MEDICINE

## 2024-09-13 PROCEDURE — 80202 ASSAY OF VANCOMYCIN: CPT | Mod: 91 | Performed by: FAMILY MEDICINE

## 2024-09-13 PROCEDURE — 85025 COMPLETE CBC W/AUTO DIFF WBC: CPT | Performed by: NURSE PRACTITIONER

## 2024-09-13 PROCEDURE — 36415 COLL VENOUS BLD VENIPUNCTURE: CPT | Performed by: FAMILY MEDICINE

## 2024-09-13 PROCEDURE — 83735 ASSAY OF MAGNESIUM: CPT | Performed by: NURSE PRACTITIONER

## 2024-09-13 PROCEDURE — 63600175 PHARM REV CODE 636 W HCPCS: Performed by: NURSE PRACTITIONER

## 2024-09-13 RX ORDER — DEXAMETHASONE 6 MG/1
18 TABLET ORAL DAILY
Qty: 21 TABLET | Refills: 0 | Status: SHIPPED | OUTPATIENT
Start: 2024-09-14 | End: 2024-09-21

## 2024-09-13 RX ADMIN — HYDROCODONE BITARTRATE AND ACETAMINOPHEN 1 TABLET: 5; 325 TABLET ORAL at 01:09

## 2024-09-13 RX ADMIN — PANTOPRAZOLE SODIUM 40 MG: 40 TABLET, DELAYED RELEASE ORAL at 08:09

## 2024-09-13 RX ADMIN — HYOSCYAMINE SULFATE 0.38 MG: 0.12 TABLET SUBLINGUAL at 08:09

## 2024-09-13 RX ADMIN — Medication 800 MG: at 12:09

## 2024-09-13 RX ADMIN — Medication 6 MG: at 01:09

## 2024-09-13 RX ADMIN — HYDROCODONE BITARTRATE AND ACETAMINOPHEN 1 TABLET: 5; 325 TABLET ORAL at 06:09

## 2024-09-13 RX ADMIN — SERTRALINE HYDROCHLORIDE 50 MG: 50 TABLET ORAL at 08:09

## 2024-09-13 RX ADMIN — DIPHENOXYLATE HYDROCHLORIDE AND ATROPINE SULFATE 1 TABLET: 2.5; .025 TABLET ORAL at 10:09

## 2024-09-13 RX ADMIN — HYDROCODONE BITARTRATE AND ACETAMINOPHEN 1 TABLET: 5; 325 TABLET ORAL at 11:09

## 2024-09-13 RX ADMIN — SERTRALINE HYDROCHLORIDE 150 MG: 50 TABLET ORAL at 08:09

## 2024-09-13 RX ADMIN — LORAZEPAM 0.5 MG: 0.5 TABLET ORAL at 08:09

## 2024-09-13 RX ADMIN — COLCHICINE 0.6 MG: 0.6 TABLET, FILM COATED ORAL at 08:09

## 2024-09-13 RX ADMIN — RIVAROXABAN 15 MG: 15 TABLET, FILM COATED ORAL at 08:09

## 2024-09-13 RX ADMIN — DEXAMETHASONE 18 MG: 4 TABLET ORAL at 08:09

## 2024-09-13 RX ADMIN — ONDANSETRON 4 MG: 2 INJECTION INTRAMUSCULAR; INTRAVENOUS at 08:09

## 2024-09-13 RX ADMIN — METOPROLOL SUCCINATE 50 MG: 50 TABLET, FILM COATED, EXTENDED RELEASE ORAL at 08:09

## 2024-09-13 RX ADMIN — Medication 800 MG: at 08:09

## 2024-09-13 NOTE — PLAN OF CARE
Pt clear for DC from case management standpoint. Discharging to home with [facility].  Patient to call sister for ride home.          09/13/24 1017   Final Note   Assessment Type Final Discharge Note   Anticipated Discharge Disposition Home-[facility] [place]   Hospital Resources/Appts/Education Provided Appointments scheduled and added to AVS

## 2024-09-13 NOTE — NURSING
Discharge instructions given to patient. Patient verbalized understanding. All questions encouraged and answered. PIV removed. Wound care done. Left floor safely with all belongings via wheelchair.

## 2024-09-13 NOTE — PLAN OF CARE
Resumption of care orders sent for patient.    09/13/24 1002   Post-Acute Status   Post-Acute Authorization Home Health   Home Health Status Referrals Sent

## 2024-09-13 NOTE — DISCHARGE SUMMARY
[facility] Medicine  Discharge Summary      Patient Name: Jacoby Jean-Baptiste  MRN: [id number]  DAYSI: [id number]  Patient Class: IP- Inpatient  Admission Date: 9/10/2024  Hospital Length of Stay: 3 days  Discharge Date and Time:  09/13/2024 2:40 PM  Attending Physician: No att. providers found   Discharging Provider: Matteo Melendez DO  Primary Care Provider: Hunter Aguilar III, MD    Primary Care Team: Networked reference to record PCT     HPI:   50 y/o male with pMHx of 2 DM, C difficile colitis, ulcerative colitis, cavitary pneumonia, HLD, and hypertension who presented to the ED with complaints of hematochezia and diarrhea since Thursday.  Patient reports that he has been having 10-15 bloody stools daily.  He is currently on budesonide 9 mg., Levsin 0.375 b.i.d., cholestyramine t.i.d., and Creon however patient never filled his cramps prescription.  Patient is with continued left upper arm site of previous I&D of abscess.  He follows outpatient with wound care for topical treatment.  On evaluation in the ED patient occult stool positive.  CO2 21, alk phos 52, magnesium 1.3, lipase 3, , hemoglobin 11.7, hematocrit 35.7.  CTA abdomen and pelvis with IV contrast with nonspecific colitis likely infectious or inflammatory in nature.  Nonobstructive left nephrolithiasis.  Cholecystectomy.  Patient will be admitted for continued IV antibiotics started in the ED with GI consult.    * No surgery found *      Hospital Course:   50 y/o male with pMHx of 2 DM, C difficile colitis, DVT, ulcerative colitis, cavitary pneumonia, HLD, and hypertension who presented to the ED with complaints of hematochezia and diarrhea since Thursday and admitted for UC flare. Stool and c.diff study were neg. GI is following patient and recommended conservative management for pain control and continue with steroids. Hgb is stable so restarted home rivaroxaban on 9/15 for chronic DVT of RUE. Concern for drug-seeking  "behavior. Wound care is following for chronic left shoulder abscess. Patient has history of multiple admission with asking for Dilaudid during every admission.Patient's physical exam does not correspond to what is being reported for pain. Patient was discharged for 7 days with steroids. Recommended to follow up with GI and wound outpatient.       Goals of Care Treatment Preferences:  Code Status: Full Code      SDOH Screening:  The patient was screened for utility difficulties, food insecurity, transport difficulties, housing insecurity, and interpersonal safety and there were no concerns identified this admission.     Consults:   Consults (From admission, onward)          Status Ordering Provider     Pharmacy to dose Vancomycin consult  Once        Provider:  (Not yet assigned)   Placed in "And" Linked Group    Acknowledged VAHID GALLARDO     Inpatient consult to Gastroenterology  Once        Provider:  Odalis Mccabe MD    Completed SHANE SINGLETARY            No new Assessment & Plan notes have been filed under this hospital service since the last note was generated.  Service: Hospital Medicine    Final Active Diagnoses:    Diagnosis Date Noted POA    PRINCIPAL PROBLEM:  Ulcerative colitis [K51.90] 04/20/2023 Yes     Chronic    Drug-seeking behavior [Z76.5] 09/11/2024 Yes    Open wound of left upper arm [S41.102A] 08/21/2024 Yes    Hypomagnesemia [E83.42] 07/15/2024 Yes    Type 2 diabetes mellitus without complication, without long-term current use of insulin [E11.9] 01/29/2022 Yes    Morbid obesity [E66.01] 02/03/2021 Yes      Problems Resolved During this Admission:       Discharged Condition: fair    Disposition: Home or Self Care    Follow Up:   Follow-up Information       Hunter Aguilar III, MD Follow up on 9/27/2024.    Specialty: Family Medicine  Why: @930  Contact information:  [address] [place] [address]  [address] [place] [address]  [phone]               Odalis Mccabe MD Follow up in 1 week(s).  "   Specialty: Gastroenterology  Contact information:  [address]  [place] [address]  [phone]               Paul Jimenes, DO Follow up in 1 week(s).    Specialty: Wound Care  Contact information:  [address]  [address]  [place] [address]  [phone]                           Patient Instructions:      Comprehensive metabolic panel   Standing Status: Future Standing Exp. Date: 12/12/25     Order Specific Question Answer Comments   Send normal result to authorizing provider's In Basket if patient is active on MyChart: Yes      CBC auto differential   Standing Status: Future Standing Exp. Date: 12/12/25     Order Specific Question Answer Comments   Send normal result to authorizing provider's In Basket if patient is active on MyChart: Yes      SUBSEQUENT HOME HEALTH ORDERS   Order Comments: Please resume home health services     Order Specific Question Answer Comments   What Home Health Agency is the patient currently using? Other/External        Significant Diagnostic Studies: N/A    Pending Diagnostic Studies:       Procedure Component Value Units Date/Time    Calprotectin, Stool [[id number]] Collected: 09/11/24 0531    Order Status: Sent Lab Status: In process Updated: 09/11/24 0553    Specimen: Stool     IgG 1, 2, 3, and 4 [[id number]] Collected: 09/11/24 1048    Order Status: Sent Lab Status: In process Updated: 09/11/24 1108    Specimen: Blood     Narrative:      Collection has been rescheduled by [facility] at 09/11/2024 04:59 Reason: Pt   request to come back  Collection has been rescheduled by [facility] at 09/11/2024 04:59 Reason: Pt   request to come back    Stool Exam-Ova,Cysts,Parasites [[id number]] Collected: 09/10/24 1640    Order Status: Sent Lab Status: In process Updated: 09/10/24 1646    Specimen: Stool            Medications:  None    Indwelling Lines/Drains at time of discharge:   Lines/Drains/Airways       None                   Time spent on the discharge of patient: 32 minutes          Matteo Melendez DO  Department of Hospital Medicine  [facility]

## 2024-09-14 LAB — BACTERIA SPEC AEROBE CULT: NORMAL

## 2024-09-15 LAB
BACTERIA BLD CULT: NORMAL
BACTERIA BLD CULT: NORMAL
IGG SERPL-MCNC: 960 MG/DL (ref 603–1613)
IGG1 SER-MCNC: 486 MG/DL (ref 248–810)
IGG2 SER-MCNC: 250 MG/DL (ref 130–555)
IGG3 SER-MCNC: 23 MG/DL (ref 15–102)
IGG4 SER-MCNC: 22 MG/DL (ref 2–96)

## 2024-09-17 LAB — CALPROTECTIN STL-MCNT: 2310 UG/G (ref 0–120)

## 2024-09-18 ENCOUNTER — OFFICE VISIT (OUTPATIENT)
Dept: WOUND CARE | Facility: HOSPITAL | Age: 49
End: 2024-09-18
Attending: FAMILY MEDICINE
Payer: COMMERCIAL

## 2024-09-18 VITALS
DIASTOLIC BLOOD PRESSURE: 91 MMHG | SYSTOLIC BLOOD PRESSURE: 148 MMHG | RESPIRATION RATE: 18 BRPM | HEART RATE: 71 BPM | TEMPERATURE: 98 F

## 2024-09-18 DIAGNOSIS — L08.9 WOUND INFECTION: ICD-10-CM

## 2024-09-18 DIAGNOSIS — L02.818 CUTANEOUS ABSCESS OF OTHER SITE: Primary | ICD-10-CM

## 2024-09-18 DIAGNOSIS — T14.8XXA WOUND INFECTION: ICD-10-CM

## 2024-09-18 DIAGNOSIS — S41.102D OPEN WOUND OF LEFT UPPER ARM, SUBSEQUENT ENCOUNTER: ICD-10-CM

## 2024-09-18 DIAGNOSIS — R52 ACUTE PAIN: ICD-10-CM

## 2024-09-18 LAB
O+P SPEC MICRO: NORMAL
O+P STL CONC: NORMAL

## 2024-09-18 PROCEDURE — 11042 DBRDMT SUBQ TIS 1ST 20SQCM/<: CPT | Mod: PN | Performed by: FAMILY MEDICINE

## 2024-09-18 RX ORDER — HYDROCODONE BITARTRATE AND ACETAMINOPHEN 5; 325 MG/1; MG/1
1 TABLET ORAL 3 TIMES DAILY
Qty: 21 TABLET | Refills: 0 | Status: SHIPPED | OUTPATIENT
Start: 2024-09-18 | End: 2024-09-25

## 2024-09-18 NOTE — PROGRESS NOTES
Wound Care Progress Note    Subjective:       Patient ID: Jacoby Jean-Baptiste is a 49 y.o. male.    Chief Complaint: No chief complaint on file.    HPI  Pt seen in clinic for a FU visit for a left arm abscess. Wound is stable, no s/s of infection at this visit. No new complaints today  Review of Systems   Skin:  Positive for wound.        Open wound left arm   All other systems reviewed and are negative.      Objective:        Physical Exam  Vitals and nursing note reviewed.   Constitutional:       General: He is not in acute distress.     Appearance: Normal appearance.   Skin:     General: Skin is warm and dry.      Findings: Lesion present.      Comments: Left arm open surgical wound, see wound care assessment documentation in chart review scanned under the media tab   Neurological:      General: No focal deficit present.      Mental Status: He is alert.   Psychiatric:         Judgment: Judgment normal.       There were no vitals filed for this visit.    Assessment:           ICD-10-CM ICD-9-CM   1. Cutaneous abscess of other site  L02.818 682.8   2. Open wound of left upper arm, subsequent encounter  S41.102D V58.89     880.03   3. Wound infection  T14.8XXA 958.3    L08.9    4. Acute pain  R52 338.19                Plan:                  Diagnoses and all orders for this visit:    Cutaneous abscess of other site    Open wound of left upper arm, subsequent encounter    Wound infection    Acute pain    Other orders  -     HYDROcodone-acetaminophen (NORCO) 5-325 mg per tablet; Take 1 tablet by mouth 3 (three) times daily. for 7 days      See wound care instructions which have been provided separately.

## 2024-09-19 ENCOUNTER — PATIENT MESSAGE (OUTPATIENT)
Dept: CARDIOLOGY | Facility: CLINIC | Age: 49
End: 2024-09-19
Payer: COMMERCIAL

## 2024-09-23 ENCOUNTER — DOCUMENT SCAN (OUTPATIENT)
Dept: HOME HEALTH SERVICES | Facility: HOSPITAL | Age: 49
End: 2024-09-23
Payer: COMMERCIAL

## 2024-09-23 ENCOUNTER — PATIENT OUTREACH (OUTPATIENT)
Dept: ADMINISTRATIVE | Facility: CLINIC | Age: 49
End: 2024-09-23
Payer: COMMERCIAL

## 2024-09-23 ENCOUNTER — PATIENT MESSAGE (OUTPATIENT)
Dept: FAMILY MEDICINE | Facility: CLINIC | Age: 49
End: 2024-09-23
Payer: COMMERCIAL

## 2024-09-23 PROBLEM — Z76.5 DRUG-SEEKING BEHAVIOR: Status: RESOLVED | Noted: 2024-09-11 | Resolved: 2024-09-23

## 2024-09-23 NOTE — PROGRESS NOTES
C3 nurse spoke with Jacoby Jean-Baptiste for a TCC post hospital discharge follow up call. The patient has a scheduled HOSFU appointment with Hunter Aguilar III, MD on 09/27/2024 @ 4874.

## 2024-09-25 ENCOUNTER — LAB VISIT (OUTPATIENT)
Dept: LAB | Facility: HOSPITAL | Age: 49
End: 2024-09-25
Attending: FAMILY MEDICINE
Payer: COMMERCIAL

## 2024-09-25 ENCOUNTER — OFFICE VISIT (OUTPATIENT)
Dept: WOUND CARE | Facility: HOSPITAL | Age: 49
End: 2024-09-25
Attending: FAMILY MEDICINE
Payer: COMMERCIAL

## 2024-09-25 VITALS
TEMPERATURE: 97 F | SYSTOLIC BLOOD PRESSURE: 104 MMHG | DIASTOLIC BLOOD PRESSURE: 77 MMHG | HEART RATE: 91 BPM | RESPIRATION RATE: 18 BRPM

## 2024-09-25 DIAGNOSIS — S41.102D OPEN WOUND OF LEFT UPPER ARM, SUBSEQUENT ENCOUNTER: ICD-10-CM

## 2024-09-25 DIAGNOSIS — L02.818 CUTANEOUS ABSCESS OF OTHER SITE: Primary | ICD-10-CM

## 2024-09-25 DIAGNOSIS — K51.311 ULCERATIVE RECTOSIGMOIDITIS WITH RECTAL BLEEDING: ICD-10-CM

## 2024-09-25 LAB
ALBUMIN SERPL BCP-MCNC: 3.7 G/DL (ref 3.5–5.2)
ALP SERPL-CCNC: 48 U/L (ref 55–135)
ALT SERPL W/O P-5'-P-CCNC: 46 U/L (ref 10–44)
ANION GAP SERPL CALC-SCNC: 7 MMOL/L (ref 8–16)
AST SERPL-CCNC: 22 U/L (ref 10–40)
BASOPHILS # BLD AUTO: 0.1 K/UL (ref 0–0.2)
BASOPHILS NFR BLD: 0.3 % (ref 0–1.9)
BILIRUB SERPL-MCNC: 0.6 MG/DL (ref 0.1–1)
BUN SERPL-MCNC: 24 MG/DL (ref 6–20)
CALCIUM SERPL-MCNC: 8.8 MG/DL (ref 8.7–10.5)
CHLORIDE SERPL-SCNC: 102 MMOL/L (ref 95–110)
CO2 SERPL-SCNC: 30 MMOL/L (ref 23–29)
CREAT SERPL-MCNC: 1.3 MG/DL (ref 0.5–1.4)
DIFFERENTIAL METHOD BLD: ABNORMAL
EOSINOPHIL # BLD AUTO: 0.1 K/UL (ref 0–0.5)
EOSINOPHIL NFR BLD: 0.2 % (ref 0–8)
ERYTHROCYTE [DISTWIDTH] IN BLOOD BY AUTOMATED COUNT: 15.9 % (ref 11.5–14.5)
EST. GFR  (NO RACE VARIABLE): >60 ML/MIN/1.73 M^2
GLUCOSE SERPL-MCNC: 123 MG/DL (ref 70–110)
HCT VFR BLD AUTO: 39.7 % (ref 40–54)
HGB BLD-MCNC: 12.5 G/DL (ref 14–18)
IMM GRANULOCYTES # BLD AUTO: 1 K/UL (ref 0–0.04)
IMM GRANULOCYTES NFR BLD AUTO: 3.1 % (ref 0–0.5)
LYMPHOCYTES # BLD AUTO: 2.5 K/UL (ref 1–4.8)
LYMPHOCYTES NFR BLD: 7.7 % (ref 18–48)
MCH RBC QN AUTO: 30.2 PG (ref 27–31)
MCHC RBC AUTO-ENTMCNC: 31.5 G/DL (ref 32–36)
MCV RBC AUTO: 96 FL (ref 82–98)
MONOCYTES # BLD AUTO: 2.4 K/UL (ref 0.3–1)
MONOCYTES NFR BLD: 7.3 % (ref 4–15)
NEUTROPHILS # BLD AUTO: 26.7 K/UL (ref 1.8–7.7)
NEUTROPHILS NFR BLD: 81.4 % (ref 38–73)
NRBC BLD-RTO: 0 /100 WBC
PLATELET # BLD AUTO: 293 K/UL (ref 150–450)
PLATELET BLD QL SMEAR: ABNORMAL
PMV BLD AUTO: 10.3 FL (ref 9.2–12.9)
POTASSIUM SERPL-SCNC: 5.3 MMOL/L (ref 3.5–5.1)
PROT SERPL-MCNC: 6 G/DL (ref 6–8.4)
RBC # BLD AUTO: 4.14 M/UL (ref 4.6–6.2)
SODIUM SERPL-SCNC: 139 MMOL/L (ref 136–145)
WBC # BLD AUTO: 32.72 K/UL (ref 3.9–12.7)

## 2024-09-25 PROCEDURE — 11042 DBRDMT SUBQ TIS 1ST 20SQCM/<: CPT | Mod: ,,, | Performed by: FAMILY MEDICINE

## 2024-09-25 PROCEDURE — 11042 DBRDMT SUBQ TIS 1ST 20SQCM/<: CPT | Mod: PN | Performed by: FAMILY MEDICINE

## 2024-09-25 PROCEDURE — 99499 UNLISTED E&M SERVICE: CPT | Mod: ,,, | Performed by: FAMILY MEDICINE

## 2024-09-25 PROCEDURE — 85025 COMPLETE CBC W/AUTO DIFF WBC: CPT | Performed by: FAMILY MEDICINE

## 2024-09-25 PROCEDURE — 80053 COMPREHEN METABOLIC PANEL: CPT | Performed by: FAMILY MEDICINE

## 2024-09-25 NOTE — PROGRESS NOTES
"          Wound Care Progress Note    Subjective:       Patient ID: Jacoby Jean-Baptiste is a 49 y.o. male.    Chief Complaint: Wound Care    HPI  Pt seen in clinic for a FU visit for a left arm open wound. Wound is improved from last visit. Pt has no new complaints today  Review of Systems   Skin:  Positive for wound.        Open wound left arm   All other systems reviewed and are negative.      Objective:        Physical Exam  Vitals and nursing note reviewed.   Constitutional:       General: He is not in acute distress.     Appearance: Normal appearance.   Skin:     General: Skin is warm and dry.      Findings: Lesion present.      Comments: Left arm open wound post abscess I and D, see wound care assessment documentation in chart review scanned under the media tab   Neurological:      General: No focal deficit present.      Mental Status: He is alert.   Psychiatric:         Mood and Affect: Mood normal.         Judgment: Judgment normal.       Vitals:    09/25/24 0918   BP: 104/77   Pulse: 91   Resp: 18   Temp: 97.3 °F (36.3 °C)       Assessment:           ICD-10-CM ICD-9-CM   1. Cutaneous abscess of other site  L02.818 682.8   2. Open wound of left upper arm, subsequent encounter  S41.102D V58.89     880.03                Plan:                  Jacoby Waters" was seen today for wound care.    Diagnoses and all orders for this visit:    Cutaneous abscess of other site    Open wound of left upper arm, subsequent encounter      Much of the documentation for this visit was completed in the Wound Docs system.  Please see the attached documentation for further details about the patient's care. Scanned under the Media tab.            "

## 2024-09-27 ENCOUNTER — OFFICE VISIT (OUTPATIENT)
Dept: FAMILY MEDICINE | Facility: CLINIC | Age: 49
End: 2024-09-27
Payer: COMMERCIAL

## 2024-09-27 VITALS
WEIGHT: 270.81 LBS | HEART RATE: 65 BPM | BODY MASS INDEX: 45.12 KG/M2 | SYSTOLIC BLOOD PRESSURE: 106 MMHG | OXYGEN SATURATION: 98 % | HEIGHT: 65 IN | DIASTOLIC BLOOD PRESSURE: 64 MMHG | TEMPERATURE: 98 F

## 2024-09-27 DIAGNOSIS — K51.911 ULCERATIVE COLITIS WITH RECTAL BLEEDING, UNSPECIFIED LOCATION: Chronic | ICD-10-CM

## 2024-09-27 DIAGNOSIS — Z23 NEED FOR ZOSTER VACCINATION: ICD-10-CM

## 2024-09-27 DIAGNOSIS — G47.00 INSOMNIA, UNSPECIFIED TYPE: ICD-10-CM

## 2024-09-27 DIAGNOSIS — Z86.79 HISTORY OF SUPRAVENTRICULAR TACHYCARDIA: ICD-10-CM

## 2024-09-27 DIAGNOSIS — Z86.79 S/P RADIOFREQUENCY ABLATION OPERATION FOR ARRHYTHMIA: ICD-10-CM

## 2024-09-27 DIAGNOSIS — L02.413 ABSCESS OF RIGHT ARM: Primary | ICD-10-CM

## 2024-09-27 DIAGNOSIS — Z98.890 S/P RADIOFREQUENCY ABLATION OPERATION FOR ARRHYTHMIA: ICD-10-CM

## 2024-09-27 DIAGNOSIS — E11.9 TYPE 2 DIABETES MELLITUS WITHOUT COMPLICATION, WITHOUT LONG-TERM CURRENT USE OF INSULIN: Primary | ICD-10-CM

## 2024-09-27 DIAGNOSIS — F41.9 ANXIETY: ICD-10-CM

## 2024-09-27 DIAGNOSIS — Z79.60 LONG-TERM USE OF IMMUNOSUPPRESSANT MEDICATION: ICD-10-CM

## 2024-09-27 PROCEDURE — 3078F DIAST BP <80 MM HG: CPT | Mod: CPTII,S$GLB,, | Performed by: FAMILY MEDICINE

## 2024-09-27 PROCEDURE — 3074F SYST BP LT 130 MM HG: CPT | Mod: CPTII,S$GLB,, | Performed by: FAMILY MEDICINE

## 2024-09-27 PROCEDURE — 3072F LOW RISK FOR RETINOPATHY: CPT | Mod: CPTII,S$GLB,, | Performed by: FAMILY MEDICINE

## 2024-09-27 PROCEDURE — 3008F BODY MASS INDEX DOCD: CPT | Mod: CPTII,S$GLB,, | Performed by: FAMILY MEDICINE

## 2024-09-27 PROCEDURE — 90471 IMMUNIZATION ADMIN: CPT | Mod: S$GLB,,, | Performed by: FAMILY MEDICINE

## 2024-09-27 PROCEDURE — 99214 OFFICE O/P EST MOD 30 MIN: CPT | Mod: 25,S$GLB,, | Performed by: FAMILY MEDICINE

## 2024-09-27 PROCEDURE — 99999 PR PBB SHADOW E&M-EST. PATIENT-LVL IV: CPT | Mod: PBBFAC,,, | Performed by: FAMILY MEDICINE

## 2024-09-27 PROCEDURE — 90750 HZV VACC RECOMBINANT IM: CPT | Mod: S$GLB,,, | Performed by: FAMILY MEDICINE

## 2024-09-27 PROCEDURE — 1111F DSCHRG MED/CURRENT MED MERGE: CPT | Mod: CPTII,S$GLB,, | Performed by: FAMILY MEDICINE

## 2024-09-27 PROCEDURE — 3044F HG A1C LEVEL LT 7.0%: CPT | Mod: CPTII,S$GLB,, | Performed by: FAMILY MEDICINE

## 2024-09-27 RX ORDER — BLOOD-GLUCOSE SENSOR
EACH MISCELLANEOUS
Qty: 5 EACH | Refills: 2 | Status: CANCELLED | OUTPATIENT
Start: 2024-09-27

## 2024-09-27 RX ORDER — ZOLPIDEM TARTRATE 10 MG/1
10 TABLET ORAL NIGHTLY PRN
Qty: 30 TABLET | Refills: 2 | Status: SHIPPED | OUTPATIENT
Start: 2024-09-27 | End: 2025-03-28

## 2024-09-27 NOTE — PROGRESS NOTES
SCRIBE #1 NOTE: I, Lennie Mendoza, am scribing for, and in the presence of, Hunter Aguilar III, MD. I have scribed the entire note.     Subjective:       Patient ID: Jacoby Jean-Baptiste is a 49 y.o. male.    Chief Complaint: Hospital Follow Up    Jacoby Jean-Baptiste is a 49 y.o. male who presents for a follow up s/p recent hospitalization at [facility] from 9/21 to 9/23. He was diagnosed with Ulcerative colitis with rectal bleeding. Was prescribed Dexamethasone 18 mg. Still has some abdominal pain. Has a follow up with Gastroenterology next week. Also diagnosed with chronic left arm abscess. Was prescribed antibiotics, he has completed them. Abscess doing okay now, follows up with home health and wound care weekly. Insomnia, on 10 mg Ambien. Depression. Anxiety, doing worse. He is not currently working. Cardiovascular good. No chest pain. No palpitations. History of supraventricular tachycardia. S/P radiofrequency ablation operation for arrhythmia. Type 2 diabetes mellitus, not on insulin currently. Last A1C 4.9. Long-term use of immunosuppressant medication. BMI of 45. Eye exam is due, scheduled for November 2024. Covid, Shingles, Flu vaccines are due. Diabetic urine screening coming up 10/27/24.          Review of Systems   Constitutional: Negative.    HENT: Negative.     Eyes: Negative.    Respiratory: Negative.     Cardiovascular: Negative.  Negative for chest pain and palpitations.   Gastrointestinal:  Positive for abdominal pain.   Endocrine: Negative.    Genitourinary: Negative.    Musculoskeletal: Negative.    Skin:  Positive for wound (abcess to left arm).   Neurological: Negative.    Hematological: Negative.    Psychiatric/Behavioral: Negative.     All other systems reviewed and are negative.      Objective:      Physical examination: Vital signs noted. No acute distress. No carotid bruit. Regular heart rate and rhythm. Lungs clear to auscultation bilaterally. Abdomen bowel sounds are positive soft and nontender.  Extremities without edema. 2+ pedal pulses.      Assessment:       1. Abscess of right arm    2. S/P radiofrequency ablation operation for arrhythmia    3. Ulcerative colitis with rectal bleeding, unspecified location    4. Long-term use of immunosuppressant medication    5. Anxiety    6. Insomnia, unspecified type    7. BMI 45.0-49.9, adult    8. History of supraventricular tachycardia    9. Need for zoster vaccination        Plan:       Abscess of right arm    S/P radiofrequency ablation operation for arrhythmia    Ulcerative colitis with rectal bleeding, unspecified location  -     Microalbumin/Creatinine Ratio, Urine; Future; Expected date: 09/27/2024    Long-term use of immunosuppressant medication  -     Microalbumin/Creatinine Ratio, Urine; Future; Expected date: 09/27/2024    Anxiety    Insomnia, unspecified type    BMI 45.0-49.9, adult    History of supraventricular tachycardia    Need for zoster vaccination  -     varicella-zoster gE vac,2 of 2 SusR 0.5 mL    Needs to go for diabetic eye exam.  Microalbumin ordered.  Declines flu shot.  Will take 2nd shingles vaccine today.  Ambien refill 30 pills with 2 refills.  See Gastroenterology as planned to consider colectomy    I, Dr Hunter Aguilar, personally performed the services described in this documentation. All medical record entries made by the scribe were at my direction and in my presence. I have reviewed the chart and agree that the record reflects my personal performance and is accurate and complete.

## 2024-09-27 NOTE — TELEPHONE ENCOUNTER
No care due was identified.  [facility] Embedded Care Due Messages. Reference number: [id number].   9/27/2024 8:28:21 AM CDT

## 2024-09-28 ENCOUNTER — HOSPITAL ENCOUNTER (EMERGENCY)
Facility: HOSPITAL | Age: 49
Discharge: HOME | End: 2024-09-28
Attending: EMERGENCY MEDICINE
Payer: COMMERCIAL

## 2024-09-28 VITALS
WEIGHT: 268 LBS | RESPIRATION RATE: 14 BRPM | HEART RATE: 77 BPM | BODY MASS INDEX: 44.6 KG/M2 | OXYGEN SATURATION: 100 % | TEMPERATURE: 98 F | DIASTOLIC BLOOD PRESSURE: 57 MMHG | SYSTOLIC BLOOD PRESSURE: 104 MMHG

## 2024-09-28 DIAGNOSIS — K51.911 ULCERATIVE COLITIS WITH RECTAL BLEEDING, UNSPECIFIED LOCATION: Primary | ICD-10-CM

## 2024-09-28 LAB
ABO + RH BLD: NORMAL
ALBUMIN SERPL BCP-MCNC: 3.7 G/DL (ref 3.5–5.2)
ALP SERPL-CCNC: 60 U/L (ref 55–135)
ALT SERPL W/O P-5'-P-CCNC: 25 U/L (ref 10–44)
ANION GAP SERPL CALC-SCNC: 8 MMOL/L (ref 8–16)
AST SERPL-CCNC: 15 U/L (ref 10–40)
BASOPHILS # BLD AUTO: 0.03 K/UL (ref 0–0.2)
BASOPHILS NFR BLD: 0.2 % (ref 0–1.9)
BILIRUB SERPL-MCNC: 0.4 MG/DL (ref 0.1–1)
BILIRUB UR QL STRIP: NEGATIVE
BLD GP AB SCN CELLS X3 SERPL QL: NORMAL
BUN SERPL-MCNC: 19 MG/DL (ref 6–20)
CALCIUM SERPL-MCNC: 8.3 MG/DL (ref 8.7–10.5)
CHLORIDE SERPL-SCNC: 104 MMOL/L (ref 95–110)
CLARITY UR: CLEAR
CO2 SERPL-SCNC: 27 MMOL/L (ref 23–29)
COLOR UR: YELLOW
CREAT SERPL-MCNC: 1 MG/DL (ref 0.5–1.4)
CREAT SERPL-MCNC: 1.1 MG/DL (ref 0.5–1.4)
DIFFERENTIAL METHOD BLD: ABNORMAL
EOSINOPHIL # BLD AUTO: 0.1 K/UL (ref 0–0.5)
EOSINOPHIL NFR BLD: 0.4 % (ref 0–8)
ERYTHROCYTE [DISTWIDTH] IN BLOOD BY AUTOMATED COUNT: 16.4 % (ref 11.5–14.5)
EST. GFR  (NO RACE VARIABLE): >60 ML/MIN/1.73 M^2
GLUCOSE SERPL-MCNC: 97 MG/DL (ref 70–110)
GLUCOSE UR QL STRIP: NEGATIVE
HCT VFR BLD AUTO: 37.1 % (ref 40–54)
HGB BLD-MCNC: 11.5 G/DL (ref 14–18)
HGB UR QL STRIP: ABNORMAL
IMM GRANULOCYTES # BLD AUTO: 0.28 K/UL (ref 0–0.04)
IMM GRANULOCYTES NFR BLD AUTO: 1.7 % (ref 0–0.5)
KETONES UR QL STRIP: NEGATIVE
LEUKOCYTE ESTERASE UR QL STRIP: NEGATIVE
LIPASE SERPL-CCNC: 23 U/L (ref 4–60)
LYMPHOCYTES # BLD AUTO: 1 K/UL (ref 1–4.8)
LYMPHOCYTES NFR BLD: 6.2 % (ref 18–48)
MAGNESIUM SERPL-MCNC: 1.5 MG/DL (ref 1.6–2.6)
MCH RBC QN AUTO: 30 PG (ref 27–31)
MCHC RBC AUTO-ENTMCNC: 31 G/DL (ref 32–36)
MCV RBC AUTO: 97 FL (ref 82–98)
MICROSCOPIC COMMENT: ABNORMAL
MONOCYTES # BLD AUTO: 0.8 K/UL (ref 0.3–1)
MONOCYTES NFR BLD: 5.2 % (ref 4–15)
NEUTROPHILS # BLD AUTO: 13.8 K/UL (ref 1.8–7.7)
NEUTROPHILS NFR BLD: 86.3 % (ref 38–73)
NITRITE UR QL STRIP: NEGATIVE
NRBC BLD-RTO: 0 /100 WBC
PH UR STRIP: 6 [PH] (ref 5–8)
PLATELET # BLD AUTO: 217 K/UL (ref 150–450)
PMV BLD AUTO: 10.8 FL (ref 9.2–12.9)
POTASSIUM SERPL-SCNC: 3.9 MMOL/L (ref 3.5–5.1)
PROT SERPL-MCNC: 6 G/DL (ref 6–8.4)
PROT UR QL STRIP: NEGATIVE
RBC # BLD AUTO: 3.83 M/UL (ref 4.6–6.2)
RBC #/AREA URNS HPF: >100 /HPF (ref 0–4)
SAMPLE: NORMAL
SODIUM SERPL-SCNC: 139 MMOL/L (ref 136–145)
SP GR UR STRIP: 1.02 (ref 1–1.03)
SPECIMEN OUTDATE: NORMAL
SQUAMOUS #/AREA URNS HPF: 1 /HPF
URN SPEC COLLECT METH UR: ABNORMAL
UROBILINOGEN UR STRIP-ACNC: NEGATIVE EU/DL
WBC # BLD AUTO: 16.01 K/UL (ref 3.9–12.7)
WBC #/AREA URNS HPF: 5 /HPF (ref 0–5)

## 2024-09-28 PROCEDURE — 80053 COMPREHEN METABOLIC PANEL: CPT | Performed by: EMERGENCY MEDICINE

## 2024-09-28 PROCEDURE — 96375 TX/PRO/DX INJ NEW DRUG ADDON: CPT

## 2024-09-28 PROCEDURE — 86901 BLOOD TYPING SEROLOGIC RH(D): CPT | Performed by: EMERGENCY MEDICINE

## 2024-09-28 PROCEDURE — 83735 ASSAY OF MAGNESIUM: CPT | Performed by: EMERGENCY MEDICINE

## 2024-09-28 PROCEDURE — 85025 COMPLETE CBC W/AUTO DIFF WBC: CPT | Performed by: EMERGENCY MEDICINE

## 2024-09-28 PROCEDURE — 96361 HYDRATE IV INFUSION ADD-ON: CPT

## 2024-09-28 PROCEDURE — 96365 THER/PROPH/DIAG IV INF INIT: CPT

## 2024-09-28 PROCEDURE — 82565 ASSAY OF CREATININE: CPT

## 2024-09-28 PROCEDURE — 63600175 PHARM REV CODE 636 W HCPCS: Performed by: EMERGENCY MEDICINE

## 2024-09-28 PROCEDURE — 86900 BLOOD TYPING SEROLOGIC ABO: CPT | Performed by: EMERGENCY MEDICINE

## 2024-09-28 PROCEDURE — 81001 URINALYSIS AUTO W/SCOPE: CPT | Performed by: EMERGENCY MEDICINE

## 2024-09-28 PROCEDURE — 99285 EMERGENCY DEPT VISIT HI MDM: CPT | Mod: 25

## 2024-09-28 PROCEDURE — 25500020 PHARM REV CODE 255: Performed by: EMERGENCY MEDICINE

## 2024-09-28 PROCEDURE — 83690 ASSAY OF LIPASE: CPT | Performed by: EMERGENCY MEDICINE

## 2024-09-28 PROCEDURE — 36415 COLL VENOUS BLD VENIPUNCTURE: CPT | Performed by: EMERGENCY MEDICINE

## 2024-09-28 RX ORDER — BUDESONIDE 3 MG/1
9 CAPSULE, COATED PELLETS ORAL DAILY
COMMUNITY

## 2024-09-28 RX ORDER — ONDANSETRON HYDROCHLORIDE 2 MG/ML
4 INJECTION, SOLUTION INTRAVENOUS
Status: COMPLETED | OUTPATIENT
Start: 2024-09-28 | End: 2024-09-28

## 2024-09-28 RX ORDER — MAGNESIUM SULFATE HEPTAHYDRATE 40 MG/ML
2 INJECTION, SOLUTION INTRAVENOUS ONCE
Status: COMPLETED | OUTPATIENT
Start: 2024-09-28 | End: 2024-09-28

## 2024-09-28 RX ORDER — HYDROCODONE BITARTRATE AND ACETAMINOPHEN 5; 325 MG/1; MG/1
1 TABLET ORAL EVERY 6 HOURS PRN
Qty: 12 TABLET | Refills: 0 | Status: SHIPPED | OUTPATIENT
Start: 2024-09-28

## 2024-09-28 RX ORDER — PREDNISONE 20 MG/1
20 TABLET ORAL DAILY
Qty: 7 TABLET | Refills: 0 | Status: SHIPPED | OUTPATIENT
Start: 2024-09-28 | End: 2024-10-05

## 2024-09-28 RX ORDER — MORPHINE SULFATE 4 MG/ML
4 INJECTION, SOLUTION INTRAMUSCULAR; INTRAVENOUS ONCE
Status: COMPLETED | OUTPATIENT
Start: 2024-09-28 | End: 2024-09-28

## 2024-09-28 RX ORDER — METHYLPREDNISOLONE SOD SUCC 125 MG
125 VIAL (EA) INJECTION
Status: DISCONTINUED | OUTPATIENT
Start: 2024-09-28 | End: 2024-09-28 | Stop reason: HOSPADM

## 2024-09-28 RX ADMIN — ONDANSETRON 4 MG: 2 INJECTION INTRAMUSCULAR; INTRAVENOUS at 10:09

## 2024-09-28 RX ADMIN — SODIUM CHLORIDE, POTASSIUM CHLORIDE, SODIUM LACTATE AND CALCIUM CHLORIDE 1000 ML: 600; 310; 30; 20 INJECTION, SOLUTION INTRAVENOUS at 10:09

## 2024-09-28 RX ADMIN — IOHEXOL 100 ML: 350 INJECTION, SOLUTION INTRAVENOUS at 12:09

## 2024-09-28 RX ADMIN — MORPHINE SULFATE 4 MG: 4 INJECTION, SOLUTION INTRAMUSCULAR; INTRAVENOUS at 10:09

## 2024-09-28 RX ADMIN — MAGNESIUM SULFATE HEPTAHYDRATE 2 G: 40 INJECTION, SOLUTION INTRAVENOUS at 01:09

## 2024-09-28 NOTE — ED NOTES
"PRIVATE ROOM. EVEN AND NON LABORED RESPIRATIONS.  AIRWAY CLEAR.  PULSES REGULAR.  < 3" CAPILLARY REFILL. MAEW.  NON DISTENDED OBESE ABDOMEN. DRESSING AT L UPPER INNER ARM.  ALERT, ORIENTED AND AMBULATORY. NAD AT THIS TIME.  CALL LIGHT IN REACH. FALLS PRECAUTIONS.   "

## 2024-09-28 NOTE — ED PROVIDER NOTES
Encounter Date: 9/28/2024       History     Chief Complaint   Patient presents with    Rectal Bleeding     Patient states his colitis is flaring up, has bright red blood in stools, stomach cramps since Monday - hx of anemia    Abdominal Pain     49-year-old male with past medical history of ulcerative colitis, C diff colitis, hypertension, hyperlipidemia, diabetes, presents emergency department with abdominal pain and blood in stool.  Patient says he is concerned he is having an ulcerative colitis flare.  He is on a biologic agent which she gets monthly injections for.  He says that he took his last injection 2 weeks ago.  His GI doctors Dr. Adama campos.  He says that he started to have abdominal cramping and increased blood in his stool for the last 5 days.  He says that he has already had 7 bowel movements this morning and it is 9:00 a.m..  He says that they are bloody.  He has pain and cramping.  He denies any fever.        Review of patient's allergies indicates:  No Known Allergies  Past Medical History:   Diagnosis Date    C. difficile colitis     Cavitary pneumonia 11/2023    pos aspergillus serology    Diabetes mellitus 01/2022    Hyperlipidemia 2015    Hypertension 2015    Insomnia 2015    Ulcerative colitis      Past Surgical History:   Procedure Laterality Date    BRONCHOSCOPY WITH FLUOROSCOPY Left 11/20/2023    Procedure: BRONCHOSCOPY, WITH FLUOROSCOPY;  Surgeon: Lisa Villarreal MD;  Location: [facility];  Service: Pulmonary;  Laterality: Left;    BRONCHOSCOPY WITH FLUOROSCOPY N/A 11/30/2023    Procedure: BRONCHOSCOPY, WITH FLUOROSCOPY;  Surgeon: Lisa Villarreal MD;  Location: [facility];  Service: Pulmonary;  Laterality: N/A;    CARDIAC ELECTROPHYSIOLOGY MAPPING AND ABLATION  2017    SVT    CHOLECYSTECTOMY  2011    COLONOSCOPY N/A 5/3/2022    Procedure: COLONOSCOPY;  Surgeon: Shan Jean III, MD;  Location: [facility];  Service: Endoscopy;  Laterality: N/A;    COLONOSCOPY N/A 3/16/2024     Procedure: COLONOSCOPY;  Surgeon: ALEKSANDER Ramos MD;  Location: [facility];  Service: Endoscopy;  Laterality: N/A;    COLONOSCOPY WITH FECAL MICROBIOTA TRANSFER N/A 3/21/2023    Procedure: COLONOSCOPY, WITH FECAL MICROBIOTA TRANSFER;  Surgeon: David Millan MD;  Location: [facility];  Service: Endoscopy;  Laterality: N/A;    ESOPHAGOGASTRODUODENOSCOPY N/A 4/24/2023    Procedure: EGD (ESOPHAGOGASTRODUODENOSCOPY);  Surgeon: Shan Jean III, MD;  Location: [facility];  Service: Endoscopy;  Laterality: N/A;    ESOPHAGOGASTRODUODENOSCOPY N/A 6/5/2024    Procedure: EGD (ESOPHAGOGASTRODUODENOSCOPY);  Surgeon: Odalis Mccabe MD;  Location: [facility];  Service: Endoscopy;  Laterality: N/A;    FLEXIBLE SIGMOIDOSCOPY N/A 6/5/2024    Procedure: SIGMOIDOSCOPY, FLEXIBLE;  Surgeon: Odalis Mccabe MD;  Location: [facility];  Service: Endoscopy;  Laterality: N/A;    FLEXIBLE SIGMOIDOSCOPY N/A 7/16/2024    Procedure: SIGMOIDOSCOPY, FLEXIBLE;  Surgeon: Shan Jean III, MD;  Location: [facility];  Service: Endoscopy;  Laterality: N/A;    FLEXIBLE SIGMOIDOSCOPY N/A 8/13/2024    Procedure: SIGMOIDOSCOPY, FLEXIBLE;  Surgeon: Shan Jean III, MD;  Location: [facility];  Service: Endoscopy;  Laterality: N/A;    GANGLION CYST EXCISION Left 1992    UMBILICAL HERNIA REPAIR  2011    with mesh     Family History   Problem Relation Name Age of Onset    Asthma Mother       Social History     Tobacco Use    Smoking status: Former     Current packs/day: 1.00     Average packs/day: 1 pack/day for 31.7 years (31.7 ttl pk-yrs)     Types: Cigarettes     Start date: 1993    Smokeless tobacco: Never    Tobacco comments:     Pt states he has stopped smoking with Chantix three weeks ago. 12/1/23   Substance Use Topics    Alcohol use: Yes     Comment: ocass    Drug use: Not Currently     Review of Systems   Constitutional:  Negative for chills and fever.   HENT:  Negative for sore throat.    Respiratory:  Negative for  shortness of breath.    Cardiovascular:  Negative for chest pain and palpitations.   Gastrointestinal:  Positive for abdominal pain and blood in stool. Negative for nausea and vomiting.   Genitourinary:  Negative for dysuria.   Musculoskeletal:  Negative for back pain.   Skin:  Negative for rash.   Neurological:  Negative for weakness.   Hematological:  Does not bruise/bleed easily.   All other systems reviewed and are negative.      Physical Exam     Initial Vitals   BP Pulse Resp Temp SpO2   09/28/24 0847 09/28/24 0847 09/28/24 0847 09/28/24 0847 09/28/24 0857   (!) 85/53 88 18 97.7 °F (36.5 °C) 100 %      MAP       --                Physical Exam    Nursing note and vitals reviewed.  Constitutional: He appears well-developed and well-nourished. He is not diaphoretic. No distress.   HENT:   Head: Normocephalic and atraumatic. Mouth/Throat: Oropharynx is clear and moist. No oropharyngeal exudate.   Eyes: Conjunctivae and EOM are normal. Pupils are equal, round, and reactive to light.   Neck: Neck supple. No tracheal deviation present.   Cardiovascular:  Normal rate, regular rhythm, normal heart sounds and intact distal pulses.           No murmur heard.  Pulmonary/Chest: Breath sounds normal. No stridor. No respiratory distress. He has no wheezes. He has no rhonchi. He has no rales.   Abdominal: Abdomen is soft. Bowel sounds are normal. He exhibits no distension. There is no abdominal tenderness. There is no rebound and no guarding.   Musculoskeletal:         General: No tenderness or edema. Normal range of motion.      Cervical back: Neck supple.     Neurological: He is alert and oriented to person, place, and time. He has normal strength. No cranial nerve deficit or sensory deficit.   Skin: Skin is warm and dry. Capillary refill takes less than 2 seconds. No rash noted. No erythema. No pallor.   Psychiatric: He has a normal mood and affect. His behavior is normal. Thought content normal.         ED Course    Procedures  Labs Reviewed   CBC W/ AUTO DIFFERENTIAL - Abnormal       Result Value    WBC 16.01 (*)     RBC 3.83 (*)     Hemoglobin 11.5 (*)     Hematocrit 37.1 (*)     MCV 97      MCH 30.0      MCHC 31.0 (*)     RDW 16.4 (*)     Platelets 217      MPV 10.8      Immature Granulocytes 1.7 (*)     Gran # (ANC) 13.8 (*)     Immature Grans (Abs) 0.28 (*)     Lymph # 1.0      Mono # 0.8      Eos # 0.1      Baso # 0.03      nRBC 0      Gran % 86.3 (*)     Lymph % 6.2 (*)     Mono % 5.2      Eosinophil % 0.4      Basophil % 0.2      Differential Method Automated     COMPREHENSIVE METABOLIC PANEL - Abnormal    Sodium 139      Potassium 3.9      Chloride 104      CO2 27      Glucose 97      BUN 19      Creatinine 1.1      Calcium 8.3 (*)     Total Protein 6.0      Albumin 3.7      Total Bilirubin 0.4      Alkaline Phosphatase 60      AST 15      ALT 25      eGFR >60.0      Anion Gap 8     MAGNESIUM - Abnormal    Magnesium 1.5 (*)    URINALYSIS, REFLEX TO URINE CULTURE - Abnormal    Specimen UA Urine, Clean Catch      Color, UA Yellow      Appearance, UA Clear      pH, UA 6.0      Specific Gravity, UA 1.025      Protein, UA Negative      Glucose, UA Negative      Ketones, UA Negative      Bilirubin (UA) Negative      Occult Blood UA 3+ (*)     Nitrite, UA Negative      Urobilinogen, UA Negative      Leukocytes, UA Negative      Narrative:     Specimen Source->Urine   URINALYSIS MICROSCOPIC - Abnormal    RBC, UA >100 (*)     WBC, UA 5      Squam Epithel, UA 1      Microscopic Comment SEE COMMENT      Narrative:     Specimen Source->Urine   LIPASE    Lipase 23     TYPE & SCREEN    Group & Rh A POS      Indirect Stephen NEG      Specimen Outdate 10/01/2024 23:59     ISTAT CREATININE    POC Creatinine 1.0      Sample VENOUS     POCT CREATININE          Imaging Results              CT Abdomen Pelvis With IV Contrast NO Oral Contrast (Final result)  Result time 09/28/24 13:16:11      Final result by Rickie Brown MD (09/28/24  13:16:11)                   Impression:      Bowel wall thickening and pericolonic stranding involving the descending and sigmoid colon consistent with acute infectious/inflammatory colitis.    Nonobstructing nephrolithiasis, more significant on the left.    Status post cholecystectomy.      Electronically signed by: Rickie Brown  Date:    09/28/2024  Time:    13:16               Narrative:    EXAMINATION:  CT ABDOMEN PELVIS WITH IV CONTRAST    CLINICAL HISTORY:  Abdominal pain, acute, nonlocalized;Blood in stool;    TECHNIQUE:  Axial CT imaging obtained through the abdomen and pelvis after 100 mL Omnipaque 350.  Coronal and sagittal reformatted images.    CMS Mandated Quality Data-CT Radiation Dose-436    All CT scans at this facility dose modulation, iterative reconstruction, and or weight-based dosing when appropriate to reduce radiation dose to as low as reasonably achievable.    COMPARISON:  CT abdomen and pelvis 09/10/2024    FINDINGS:  Lung bases are clear.  Bone window images show degenerative changes of the spine.  No acute or aggressive osseous abnormality.    No focal hepatic lesion.  Gallbladder is absent.  No intrahepatic or extrahepatic bile duct dilation.  Portal vein is patent.  Spleen is unremarkable.  Small left upper quadrant splenule.  Pancreas is within normal limits.  Duodenal diverticulum.    No adrenal lesion.  2 mm calculus at the upper pole of the right kidney.  Multiple nonobstructing left renal calculi ranging in size from 2 mm to 7 mm.  Ureters are normal in caliber.  Urinary bladder is unremarkable.    Stomach is within normal limits.  No evidence of small-bowel obstruction.  Normal appendix.  Although the descending and sigmoid colon is largely collapsed there is perceived mild colonic bowel wall thickening and mild pericolonic inflammatory stranding suggesting colitis.  No evidence of perforation.  No abscess.    No free fluid or free air in the abdomen or pelvis.  Uterus is  surgically absent.  No abdominal or pelvic lymphadenopathy.                                       Medications   magnesium sulfate 2g in water 50mL IVPB (premix) (2 g Intravenous New Bag 9/28/24 1330)   methylPREDNISolone sodium succinate injection 125 mg (has no administration in time range)   lactated ringers bolus 1,000 mL (1,000 mLs Intravenous New Bag 9/28/24 1053)   morphine injection 4 mg (4 mg Intravenous Given 9/28/24 1057)   ondansetron injection 4 mg (4 mg Intravenous Given 9/28/24 1055)   iohexoL (OMNIPAQUE 350) injection 100 mL (100 mLs Intravenous Given 9/28/24 1244)     Medical Decision Making  Differential includes but not limited to peptic ulcer disease, pancreatitis, splenic infarct, kidney stone, pyelonephritis, urinary tract infection, bowel obstruction, volvulus, intussusception, mesenteric adenitis, mesenteric ischemia, intra-abdominal abscess, intra-abdominal hemorrhage, ACS, pneumonia, constipation, perforation, appendicitis, cholecystitis, cholelithiasis, cholangitis, cancer, ulcerative colitis flare    Emergent evaluation of a 49-year-old male presents emergency department with blood in his stool and abdominal pain.  Patient has evidence of ulcerative colitis flare.  White count is elevated, renal function is within normal limits.  Mild hypomagnesemia which has been repleted with IV magnesium.  Patient is stable has not had any more bloody bowel movements since being here in the ER.  I discussed the case with GI who recommended outpatient management with steroids.  Patient has been given IV Solu-Medrol here in the emergency department.  He will be discharged home on 20 mg of prednisone at GI Dr. Jo direction.  Patient will follow up with him early next week.  He will return if his symptoms change or worsen.    A dictation software program was used for this note.  Please expect some simple typographical  errors in this note.    I had a detailed discussion with the patient and/or  guardian regarding: The historical points, exam findings, and diagnostic results supporting the discharge diagnosis, lab results, pertinent radiology results, and the need for outpatient follow-up, for definitive care with a GI doctor and to return to the emergency department if symptoms worsen or persist or if there are any questions or concerns that arise at home. All questions have been answered in detail. Strict return to Emergency Department precautions have been provided.         Amount and/or Complexity of Data Reviewed  External Data Reviewed: labs and notes.  Labs: ordered. Decision-making details documented in ED Course.  Radiology: ordered and independent interpretation performed. Decision-making details documented in ED Course.    Risk  OTC drugs.  Prescription drug management.  Decision regarding hospitalization.               ED Course as of 09/28/24 1447   Sat Sep 28, 2024   1430 I talked with Dr. Jo from GI who stated that he thinks he can go home on steroids.  Recommended 20 mg of prednisone. [JR]      ED Course User Index  [JR] Mikel Decker DO                           Clinical Impression:  Final diagnoses:  [K51.911] Ulcerative colitis with rectal bleeding, unspecified location (Primary)          ED Disposition Condition    Discharge Stable          ED Prescriptions       Medication Sig Dispense Start Date End Date Auth. Provider    predniSONE (DELTASONE) 20 MG tablet Take 1 tablet (20 mg total) by mouth once daily. for 7 days 7 tablet 9/28/2024 10/5/2024 Mikel Decker DO    HYDROcodone-acetaminophen (NORCO) 5-325 mg per tablet Take 1 tablet by mouth every 6 (six) hours as needed for Pain. 12 tablet 9/28/2024 -- Mikel Decker DO          Follow-up Information       Follow up With Specialties Details Why Contact Info Additional Information    [facility] - Emergency Dept Emergency Medicine  If symptoms worsen [address]  [address]  [phone] 1st floor    [place], Shan ONEILL III, MD Gastroenterology In 3 days  [address] [facility] [address]  [phone]                Mikel Decker DO  09/28/24 1443

## 2024-09-28 NOTE — ED NOTES
C/O rectal bleeding. Worsening SX over several days. Pale. L upper arm wound-  Pt seeing wound care. Seen by MD at room arrival.  Monitoring in progress.

## 2024-09-28 NOTE — DISCHARGE INSTRUCTIONS
RETURN TO EMERGENCY DEPARTMENT WITHOUT FAIL, IF YOUR SYMPTOMS WORSEN, IF YOU GET NEW OR DIFFERENT SYMPTOMS, IF YOU ARE UNABLE TO FOLLOW UP AS DIRECTED, OR IF YOU HAVE ANY CONCERNS OR WORRIES.    Follow up with GI on an outpatient basis.

## 2024-10-01 ENCOUNTER — PATIENT OUTREACH (OUTPATIENT)
Dept: EMERGENCY MEDICINE | Facility: HOSPITAL | Age: 49
End: 2024-10-01

## 2024-10-02 ENCOUNTER — OFFICE VISIT (OUTPATIENT)
Dept: WOUND CARE | Facility: HOSPITAL | Age: 49
End: 2024-10-02
Attending: FAMILY MEDICINE
Payer: COMMERCIAL

## 2024-10-02 VITALS
DIASTOLIC BLOOD PRESSURE: 66 MMHG | SYSTOLIC BLOOD PRESSURE: 125 MMHG | RESPIRATION RATE: 16 BRPM | TEMPERATURE: 98 F | HEART RATE: 76 BPM

## 2024-10-02 DIAGNOSIS — S41.102D OPEN WOUND OF LEFT UPPER ARM, SUBSEQUENT ENCOUNTER: ICD-10-CM

## 2024-10-02 DIAGNOSIS — L02.818 CUTANEOUS ABSCESS OF OTHER SITE: Primary | ICD-10-CM

## 2024-10-02 PROCEDURE — 99213 OFFICE O/P EST LOW 20 MIN: CPT | Mod: ,,, | Performed by: FAMILY MEDICINE

## 2024-10-02 PROCEDURE — 1160F RVW MEDS BY RX/DR IN RCRD: CPT | Mod: CPTII,,, | Performed by: FAMILY MEDICINE

## 2024-10-02 PROCEDURE — 1111F DSCHRG MED/CURRENT MED MERGE: CPT | Mod: CPTII,,, | Performed by: FAMILY MEDICINE

## 2024-10-02 PROCEDURE — 87075 CULTR BACTERIA EXCEPT BLOOD: CPT | Performed by: FAMILY MEDICINE

## 2024-10-02 PROCEDURE — 3044F HG A1C LEVEL LT 7.0%: CPT | Mod: CPTII,,, | Performed by: FAMILY MEDICINE

## 2024-10-02 PROCEDURE — 87070 CULTURE OTHR SPECIMN AEROBIC: CPT | Performed by: FAMILY MEDICINE

## 2024-10-02 PROCEDURE — 3072F LOW RISK FOR RETINOPATHY: CPT | Mod: CPTII,,, | Performed by: FAMILY MEDICINE

## 2024-10-02 PROCEDURE — 99214 OFFICE O/P EST MOD 30 MIN: CPT | Mod: PN | Performed by: FAMILY MEDICINE

## 2024-10-02 PROCEDURE — 1159F MED LIST DOCD IN RCRD: CPT | Mod: CPTII,,, | Performed by: FAMILY MEDICINE

## 2024-10-02 NOTE — PROGRESS NOTES
Wound Care Progress Note    Subjective:       Patient ID: Jacoby Jean-Baptiste is a 49 y.o. male.    Chief Complaint: No chief complaint on file.    HPI  Pt seen in clinic for a FU visit for a left arm open surgical wound from an abscess. Wound had some thick yellow drainage today. Pt has no other complaints today, no pain at this time.  Review of Systems   Skin:  Positive for wound.        Open wound left arm   All other systems reviewed and are negative.      Objective:        Physical Exam  Vitals and nursing note reviewed.   Constitutional:       General: He is not in acute distress.     Appearance: Normal appearance.   Skin:     General: Skin is warm and dry.      Findings: Lesion present.      Comments: Left arm open wound, see wound care assessment documentation in chart review scanned under the media tab   Neurological:      General: No focal deficit present.      Mental Status: He is alert.   Psychiatric:         Mood and Affect: Mood normal.         Judgment: Judgment normal.       There were no vitals filed for this visit.    Assessment:           ICD-10-CM ICD-9-CM   1. Cutaneous abscess of other site  L02.818 682.8   2. Open wound of left upper arm, subsequent encounter  S41.102D V58.89     880.03                Plan:                  Diagnoses and all orders for this visit:    Cutaneous abscess of other site    Open wound of left upper arm, subsequent encounter  -     Aerobic culture  -     Culture, Anaerobic        See attached wound care documentation.

## 2024-10-03 ENCOUNTER — DOCUMENT SCAN (OUTPATIENT)
Dept: HOME HEALTH SERVICES | Facility: HOSPITAL | Age: 49
End: 2024-10-03
Payer: COMMERCIAL

## 2024-10-04 LAB — BACTERIA SPEC ANAEROBE CULT: NORMAL

## 2024-10-05 LAB — BACTERIA SPEC AEROBE CULT: NORMAL

## 2024-10-08 ENCOUNTER — HOSPITAL ENCOUNTER (OUTPATIENT)
Dept: RADIOLOGY | Facility: HOSPITAL | Age: 49
Discharge: HOME | End: 2024-10-08
Attending: NURSE PRACTITIONER
Payer: COMMERCIAL

## 2024-10-08 DIAGNOSIS — I82.A11 ACUTE DEEP VEIN THROMBOSIS (DVT) OF AXILLARY VEIN OF RIGHT UPPER EXTREMITY: ICD-10-CM

## 2024-10-08 PROCEDURE — 93971 EXTREMITY STUDY: CPT | Mod: TC,PO,RT

## 2024-10-08 PROCEDURE — 93971 EXTREMITY STUDY: CPT | Mod: 26,RT,, | Performed by: RADIOLOGY

## 2024-10-09 ENCOUNTER — TELEPHONE (OUTPATIENT)
Dept: PAIN MEDICINE | Facility: CLINIC | Age: 49
End: 2024-10-09
Payer: COMMERCIAL

## 2024-10-09 ENCOUNTER — OFFICE VISIT (OUTPATIENT)
Dept: WOUND CARE | Facility: HOSPITAL | Age: 49
End: 2024-10-09
Attending: FAMILY MEDICINE
Payer: COMMERCIAL

## 2024-10-09 VITALS
DIASTOLIC BLOOD PRESSURE: 91 MMHG | HEART RATE: 90 BPM | SYSTOLIC BLOOD PRESSURE: 149 MMHG | RESPIRATION RATE: 18 BRPM | TEMPERATURE: 97 F

## 2024-10-09 DIAGNOSIS — L02.818 CUTANEOUS ABSCESS OF OTHER SITE: Primary | ICD-10-CM

## 2024-10-09 DIAGNOSIS — S41.102D OPEN WOUND OF LEFT UPPER ARM, SUBSEQUENT ENCOUNTER: ICD-10-CM

## 2024-10-09 PROCEDURE — 99213 OFFICE O/P EST LOW 20 MIN: CPT | Mod: ,,, | Performed by: FAMILY MEDICINE

## 2024-10-09 PROCEDURE — 99213 OFFICE O/P EST LOW 20 MIN: CPT | Mod: PN | Performed by: FAMILY MEDICINE

## 2024-10-09 PROCEDURE — 3061F NEG MICROALBUMINURIA REV: CPT | Mod: CPTII,,, | Performed by: FAMILY MEDICINE

## 2024-10-09 PROCEDURE — 1160F RVW MEDS BY RX/DR IN RCRD: CPT | Mod: CPTII,,, | Performed by: FAMILY MEDICINE

## 2024-10-09 PROCEDURE — 3072F LOW RISK FOR RETINOPATHY: CPT | Mod: CPTII,,, | Performed by: FAMILY MEDICINE

## 2024-10-09 PROCEDURE — 3066F NEPHROPATHY DOC TX: CPT | Mod: CPTII,,, | Performed by: FAMILY MEDICINE

## 2024-10-09 PROCEDURE — 3044F HG A1C LEVEL LT 7.0%: CPT | Mod: CPTII,,, | Performed by: FAMILY MEDICINE

## 2024-10-09 PROCEDURE — 3080F DIAST BP >= 90 MM HG: CPT | Mod: CPTII,,, | Performed by: FAMILY MEDICINE

## 2024-10-09 PROCEDURE — 1159F MED LIST DOCD IN RCRD: CPT | Mod: CPTII,,, | Performed by: FAMILY MEDICINE

## 2024-10-09 PROCEDURE — 1111F DSCHRG MED/CURRENT MED MERGE: CPT | Mod: CPTII,,, | Performed by: FAMILY MEDICINE

## 2024-10-09 PROCEDURE — 3077F SYST BP >= 140 MM HG: CPT | Mod: CPTII,,, | Performed by: FAMILY MEDICINE

## 2024-10-09 NOTE — PROGRESS NOTES
"          Wound Care Progress Note    Subjective:       Patient ID: Jacoby Jean-Baptiste is a 49 y.o. male.    Chief Complaint: Wound Care    HPI  Pt seen in clinic for a FU visit for a left upper arm surgical wound from abscess. Wound is stalled today, will make some changes to treatment plan. No new complaints today  Review of Systems   Skin:  Positive for wound.        Open wound left arm   All other systems reviewed and are negative.      Objective:        Physical Exam  Vitals and nursing note reviewed.   Constitutional:       General: He is not in acute distress.     Appearance: Normal appearance.   Skin:     General: Skin is warm and dry.      Findings: Lesion present.      Comments: Left arm surgical wound, see wound care assessment documentation in chart review scanned under the media tab   Neurological:      General: No focal deficit present.      Mental Status: He is alert.   Psychiatric:         Judgment: Judgment normal.       Vitals:    10/09/24 0917   BP: (!) 149/91   Pulse: 90   Resp: 18   Temp: 96.6 °F (35.9 °C)       Assessment:           ICD-10-CM ICD-9-CM   1. Cutaneous abscess of other site  L02.818 682.8   2. Open wound of left upper arm, subsequent encounter  S41.102D V58.89     880.03                Plan:                  Jacoby Waters" was seen today for wound care.    Diagnoses and all orders for this visit:    Cutaneous abscess of other site    Open wound of left upper arm, subsequent encounter      Please see wound care instructions which have been provided separately.             "

## 2024-10-10 ENCOUNTER — DOCUMENT SCAN (OUTPATIENT)
Dept: HOME HEALTH SERVICES | Facility: HOSPITAL | Age: 49
End: 2024-10-10
Payer: COMMERCIAL

## 2024-10-10 PROCEDURE — 99499 UNLISTED E&M SERVICE: CPT | Mod: ,,, | Performed by: FAMILY MEDICINE

## 2024-10-16 ENCOUNTER — OFFICE VISIT (OUTPATIENT)
Dept: WOUND CARE | Facility: HOSPITAL | Age: 49
End: 2024-10-16
Attending: FAMILY MEDICINE
Payer: COMMERCIAL

## 2024-10-16 VITALS
WEIGHT: 268.06 LBS | SYSTOLIC BLOOD PRESSURE: 140 MMHG | HEIGHT: 65 IN | DIASTOLIC BLOOD PRESSURE: 86 MMHG | RESPIRATION RATE: 6 BRPM | TEMPERATURE: 98 F | BODY MASS INDEX: 44.66 KG/M2 | HEART RATE: 86 BPM

## 2024-10-16 DIAGNOSIS — L02.818 CUTANEOUS ABSCESS OF OTHER SITE: Primary | ICD-10-CM

## 2024-10-16 DIAGNOSIS — R52 ACUTE PAIN: ICD-10-CM

## 2024-10-16 DIAGNOSIS — S41.102D OPEN WOUND OF LEFT UPPER ARM, SUBSEQUENT ENCOUNTER: ICD-10-CM

## 2024-10-16 PROCEDURE — 99213 OFFICE O/P EST LOW 20 MIN: CPT | Mod: ,,, | Performed by: FAMILY MEDICINE

## 2024-10-16 PROCEDURE — 3061F NEG MICROALBUMINURIA REV: CPT | Mod: CPTII,,, | Performed by: FAMILY MEDICINE

## 2024-10-16 PROCEDURE — 3044F HG A1C LEVEL LT 7.0%: CPT | Mod: CPTII,,, | Performed by: FAMILY MEDICINE

## 2024-10-16 PROCEDURE — G0179 MD RECERTIFICATION HHA PT: HCPCS | Mod: ,,, | Performed by: FAMILY MEDICINE

## 2024-10-16 PROCEDURE — 99213 OFFICE O/P EST LOW 20 MIN: CPT | Mod: PN | Performed by: FAMILY MEDICINE

## 2024-10-16 PROCEDURE — 3079F DIAST BP 80-89 MM HG: CPT | Mod: CPTII,,, | Performed by: FAMILY MEDICINE

## 2024-10-16 PROCEDURE — 3072F LOW RISK FOR RETINOPATHY: CPT | Mod: CPTII,,, | Performed by: FAMILY MEDICINE

## 2024-10-16 PROCEDURE — 3077F SYST BP >= 140 MM HG: CPT | Mod: CPTII,,, | Performed by: FAMILY MEDICINE

## 2024-10-16 PROCEDURE — 1159F MED LIST DOCD IN RCRD: CPT | Mod: CPTII,,, | Performed by: FAMILY MEDICINE

## 2024-10-16 PROCEDURE — 3066F NEPHROPATHY DOC TX: CPT | Mod: CPTII,,, | Performed by: FAMILY MEDICINE

## 2024-10-16 PROCEDURE — 3008F BODY MASS INDEX DOCD: CPT | Mod: CPTII,,, | Performed by: FAMILY MEDICINE

## 2024-10-16 NOTE — PROGRESS NOTES
"          Wound Care Progress Note    Subjective:       Patient ID: Jacoby Jean-Baptiste is a 49 y.o. male.    Chief Complaint: Wound Care and Wound Check    Wound Check  Pt seen in clinic for a FU visit for a left arm surgical wound. Wound continues to slowly improve. No new complaints today. Still some pain but much improved.   Review of Systems   Skin:  Positive for wound.        Open wound left arm   All other systems reviewed and are negative.      Objective:        Physical Exam  Vitals and nursing note reviewed.   Constitutional:       General: He is not in acute distress.     Appearance: Normal appearance.   Skin:     General: Skin is warm and dry.      Findings: Lesion present.      Comments: Left arm open wound, see wound care assessment documentation in chart review scanned under the media tab   Neurological:      General: No focal deficit present.      Mental Status: He is alert.   Psychiatric:         Mood and Affect: Mood normal.       There were no vitals filed for this visit.    Assessment:           ICD-10-CM ICD-9-CM   1. Cutaneous abscess of other site  L02.818 682.8   2. Open wound of left upper arm, subsequent encounter  S41.102D V58.89     880.03   3. Acute pain  R52 338.19                Plan:                  Jacoby Waters" was seen today for wound care and wound check.    Diagnoses and all orders for this visit:    Cutaneous abscess of other site    Open wound of left upper arm, subsequent encounter    Acute pain      See wound care instructions provided separately            "

## 2024-10-19 ENCOUNTER — DOCUMENT SCAN (OUTPATIENT)
Dept: HOME HEALTH SERVICES | Facility: HOSPITAL | Age: 49
End: 2024-10-19
Payer: COMMERCIAL

## 2024-10-19 PROCEDURE — 99499 UNLISTED E&M SERVICE: CPT | Mod: ,,, | Performed by: FAMILY MEDICINE

## 2024-10-23 ENCOUNTER — OFFICE VISIT (OUTPATIENT)
Dept: WOUND CARE | Facility: HOSPITAL | Age: 49
End: 2024-10-23
Attending: FAMILY MEDICINE
Payer: COMMERCIAL

## 2024-10-23 ENCOUNTER — PATIENT MESSAGE (OUTPATIENT)
Dept: ADMINISTRATIVE | Facility: HOSPITAL | Age: 49
End: 2024-10-23
Payer: COMMERCIAL

## 2024-10-23 VITALS
RESPIRATION RATE: 18 BRPM | SYSTOLIC BLOOD PRESSURE: 126 MMHG | DIASTOLIC BLOOD PRESSURE: 74 MMHG | TEMPERATURE: 98 F | HEART RATE: 87 BPM

## 2024-10-23 DIAGNOSIS — S41.102D OPEN WOUND OF LEFT UPPER ARM, SUBSEQUENT ENCOUNTER: Primary | ICD-10-CM

## 2024-10-23 DIAGNOSIS — R52 ACUTE PAIN: ICD-10-CM

## 2024-10-23 DIAGNOSIS — L02.818 CUTANEOUS ABSCESS OF OTHER SITE: ICD-10-CM

## 2024-10-23 PROCEDURE — 99213 OFFICE O/P EST LOW 20 MIN: CPT | Mod: PN | Performed by: FAMILY MEDICINE

## 2024-10-23 NOTE — PROGRESS NOTES
"          Wound Care Progress Note    Subjective:       Patient ID: Jacoby Jean-Baptiste is a 49 y.o. male.    Chief Complaint: Wound Care    HPI  Pt seen ijn clinic for a FU visit for a left arm open wound from an abscess. Woubnd is slow but improving. No new complaints today  Review of Systems   Skin:  Positive for wound.        Open wound left arm   All other systems reviewed and are negative.      Objective:        Physical Exam  Vitals and nursing note reviewed.   Constitutional:       General: He is not in acute distress.     Appearance: Normal appearance.   Skin:     General: Skin is warm and dry.      Findings: Lesion present.      Comments: Left arm open surgical wound, see wound care asessment documentation in chart review scanned under the media tab   Neurological:      General: No focal deficit present.      Mental Status: He is alert.   Psychiatric:         Mood and Affect: Mood normal.         Judgment: Judgment normal.       Vitals:    10/23/24 1057   BP: 126/74   Pulse: 87   Resp: 18   Temp: 98.2 °F (36.8 °C)       Assessment:           ICD-10-CM ICD-9-CM   1. Open wound of left upper arm, subsequent encounter  S41.102D V58.89     880.03   2. Acute pain  R52 338.19                Plan:                  Jacoby Waters" was seen today for wound care.    Diagnoses and all orders for this visit:    Open wound of left upper arm, subsequent encounter    Acute pain        See wound care instructions which have been provided separately.          "

## 2024-10-29 ENCOUNTER — DOCUMENT SCAN (OUTPATIENT)
Dept: HOME HEALTH SERVICES | Facility: HOSPITAL | Age: 49
End: 2024-10-29
Payer: COMMERCIAL

## 2024-10-30 ENCOUNTER — OFFICE VISIT (OUTPATIENT)
Dept: WOUND CARE | Facility: HOSPITAL | Age: 49
End: 2024-10-30
Attending: FAMILY MEDICINE
Payer: COMMERCIAL

## 2024-10-30 VITALS
DIASTOLIC BLOOD PRESSURE: 76 MMHG | TEMPERATURE: 98 F | HEART RATE: 91 BPM | SYSTOLIC BLOOD PRESSURE: 129 MMHG | RESPIRATION RATE: 18 BRPM

## 2024-10-30 DIAGNOSIS — L02.818 CUTANEOUS ABSCESS OF OTHER SITE: ICD-10-CM

## 2024-10-30 DIAGNOSIS — S41.102D OPEN WOUND OF LEFT UPPER ARM, SUBSEQUENT ENCOUNTER: Primary | ICD-10-CM

## 2024-10-30 PROCEDURE — 97602 WOUND(S) CARE NON-SELECTIVE: CPT | Mod: PN | Performed by: FAMILY MEDICINE

## 2024-10-30 PROCEDURE — 3044F HG A1C LEVEL LT 7.0%: CPT | Mod: CPTII,,, | Performed by: FAMILY MEDICINE

## 2024-10-30 PROCEDURE — 3078F DIAST BP <80 MM HG: CPT | Mod: CPTII,,, | Performed by: FAMILY MEDICINE

## 2024-10-30 PROCEDURE — 3066F NEPHROPATHY DOC TX: CPT | Mod: CPTII,,, | Performed by: FAMILY MEDICINE

## 2024-10-30 PROCEDURE — 3074F SYST BP LT 130 MM HG: CPT | Mod: CPTII,,, | Performed by: FAMILY MEDICINE

## 2024-10-30 PROCEDURE — 3061F NEG MICROALBUMINURIA REV: CPT | Mod: CPTII,,, | Performed by: FAMILY MEDICINE

## 2024-10-30 PROCEDURE — 1159F MED LIST DOCD IN RCRD: CPT | Mod: CPTII,,, | Performed by: FAMILY MEDICINE

## 2024-10-30 PROCEDURE — 1160F RVW MEDS BY RX/DR IN RCRD: CPT | Mod: CPTII,,, | Performed by: FAMILY MEDICINE

## 2024-10-30 PROCEDURE — 3072F LOW RISK FOR RETINOPATHY: CPT | Mod: CPTII,,, | Performed by: FAMILY MEDICINE

## 2024-10-30 PROCEDURE — 99213 OFFICE O/P EST LOW 20 MIN: CPT | Mod: ,,, | Performed by: FAMILY MEDICINE

## 2024-11-01 ENCOUNTER — HOSPITAL ENCOUNTER (INPATIENT)
Facility: HOSPITAL | Age: 49
LOS: 1 days | Discharge: HOME HEALTH | DRG: 386 | End: 2024-11-04
Attending: STUDENT IN AN ORGANIZED HEALTH CARE EDUCATION/TRAINING PROGRAM | Admitting: HOSPITALIST
Payer: COMMERCIAL

## 2024-11-01 DIAGNOSIS — R42 DIZZINESS: ICD-10-CM

## 2024-11-01 DIAGNOSIS — R29.818 ACUTE FOCAL NEUROLOGICAL DEFICIT: ICD-10-CM

## 2024-11-01 DIAGNOSIS — R07.9 CHEST PAIN: ICD-10-CM

## 2024-11-01 DIAGNOSIS — R42 LIGHT HEADED: ICD-10-CM

## 2024-11-01 DIAGNOSIS — Z86.718 HISTORY OF DVT (DEEP VEIN THROMBOSIS): Primary | ICD-10-CM

## 2024-11-01 DIAGNOSIS — K51.90 EXACERBATION OF ULCERATIVE COLITIS WITHOUT COMPLICATION: ICD-10-CM

## 2024-11-01 DIAGNOSIS — K51.90: ICD-10-CM

## 2024-11-01 LAB
ALBUMIN SERPL BCP-MCNC: 4 G/DL (ref 3.5–5.2)
ALP SERPL-CCNC: 70 U/L (ref 55–135)
ALT SERPL W/O P-5'-P-CCNC: 8 U/L (ref 10–44)
ANION GAP SERPL CALC-SCNC: 8 MMOL/L (ref 8–16)
AST SERPL-CCNC: 16 U/L (ref 10–40)
BASOPHILS # BLD AUTO: 0.07 K/UL (ref 0–0.2)
BASOPHILS NFR BLD: 0.4 % (ref 0–1.9)
BILIRUB SERPL-MCNC: 0.3 MG/DL (ref 0.1–1)
BILIRUB UR QL STRIP: NEGATIVE
BNP SERPL-MCNC: 49 PG/ML (ref 0–99)
BUN SERPL-MCNC: 17 MG/DL (ref 6–20)
CALCIUM SERPL-MCNC: 9.3 MG/DL (ref 8.7–10.5)
CHLORIDE SERPL-SCNC: 106 MMOL/L (ref 95–110)
CLARITY UR: CLEAR
CO2 SERPL-SCNC: 26 MMOL/L (ref 23–29)
COLOR UR: YELLOW
CREAT SERPL-MCNC: 1.1 MG/DL (ref 0.5–1.4)
CREAT SERPL-MCNC: 1.1 MG/DL (ref 0.5–1.4)
DIFFERENTIAL METHOD BLD: ABNORMAL
EOSINOPHIL # BLD AUTO: 0.3 K/UL (ref 0–0.5)
EOSINOPHIL NFR BLD: 1.6 % (ref 0–8)
ERYTHROCYTE [DISTWIDTH] IN BLOOD BY AUTOMATED COUNT: 15.3 % (ref 11.5–14.5)
EST. GFR  (NO RACE VARIABLE): >60 ML/MIN/1.73 M^2
GLUCOSE SERPL-MCNC: 93 MG/DL (ref 70–110)
GLUCOSE UR QL STRIP: NEGATIVE
HCT VFR BLD AUTO: 32 % (ref 40–54)
HGB BLD-MCNC: 10 G/DL (ref 14–18)
HGB UR QL STRIP: ABNORMAL
IMM GRANULOCYTES # BLD AUTO: 0.11 K/UL (ref 0–0.04)
IMM GRANULOCYTES NFR BLD AUTO: 0.6 % (ref 0–0.5)
INR PPP: 1 (ref 0.8–1.2)
KETONES UR QL STRIP: NEGATIVE
LEUKOCYTE ESTERASE UR QL STRIP: NEGATIVE
LIPASE SERPL-CCNC: 9 U/L (ref 4–60)
LYMPHOCYTES # BLD AUTO: 1.8 K/UL (ref 1–4.8)
LYMPHOCYTES NFR BLD: 10.3 % (ref 18–48)
MCH RBC QN AUTO: 29.2 PG (ref 27–31)
MCHC RBC AUTO-ENTMCNC: 31.3 G/DL (ref 32–36)
MCV RBC AUTO: 94 FL (ref 82–98)
MICROSCOPIC COMMENT: ABNORMAL
MONOCYTES # BLD AUTO: 1.5 K/UL (ref 0.3–1)
MONOCYTES NFR BLD: 8.9 % (ref 4–15)
NEUTROPHILS # BLD AUTO: 13.3 K/UL (ref 1.8–7.7)
NEUTROPHILS NFR BLD: 78.2 % (ref 38–73)
NITRITE UR QL STRIP: NEGATIVE
NRBC BLD-RTO: 0 /100 WBC
PH UR STRIP: 6 [PH] (ref 5–8)
PLATELET # BLD AUTO: 258 K/UL (ref 150–450)
PMV BLD AUTO: 10.7 FL (ref 9.2–12.9)
POTASSIUM SERPL-SCNC: 4.6 MMOL/L (ref 3.5–5.1)
PROT SERPL-MCNC: 7.4 G/DL (ref 6–8.4)
PROT UR QL STRIP: NEGATIVE
PROTHROMBIN TIME: 10.9 SEC (ref 9–12.5)
RBC # BLD AUTO: 3.42 M/UL (ref 4.6–6.2)
RBC #/AREA URNS HPF: >100 /HPF (ref 0–4)
SAMPLE: NORMAL
SODIUM SERPL-SCNC: 140 MMOL/L (ref 136–145)
SP GR UR STRIP: 1.02 (ref 1–1.03)
URN SPEC COLLECT METH UR: ABNORMAL
UROBILINOGEN UR STRIP-ACNC: NEGATIVE EU/DL
WBC # BLD AUTO: 17.02 K/UL (ref 3.9–12.7)
WBC #/AREA URNS HPF: 2 /HPF (ref 0–5)

## 2024-11-01 PROCEDURE — 93010 ELECTROCARDIOGRAM REPORT: CPT | Mod: ,,, | Performed by: INTERNAL MEDICINE

## 2024-11-01 PROCEDURE — 99285 EMERGENCY DEPT VISIT HI MDM: CPT | Mod: 25

## 2024-11-01 PROCEDURE — 83690 ASSAY OF LIPASE: CPT | Performed by: PHYSICIAN ASSISTANT

## 2024-11-01 PROCEDURE — 85610 PROTHROMBIN TIME: CPT | Performed by: STUDENT IN AN ORGANIZED HEALTH CARE EDUCATION/TRAINING PROGRAM

## 2024-11-01 PROCEDURE — 81001 URINALYSIS AUTO W/SCOPE: CPT | Performed by: PHYSICIAN ASSISTANT

## 2024-11-01 PROCEDURE — 86140 C-REACTIVE PROTEIN: CPT | Performed by: STUDENT IN AN ORGANIZED HEALTH CARE EDUCATION/TRAINING PROGRAM

## 2024-11-01 PROCEDURE — 84443 ASSAY THYROID STIM HORMONE: CPT | Performed by: STUDENT IN AN ORGANIZED HEALTH CARE EDUCATION/TRAINING PROGRAM

## 2024-11-01 PROCEDURE — 85025 COMPLETE CBC W/AUTO DIFF WBC: CPT | Performed by: PHYSICIAN ASSISTANT

## 2024-11-01 PROCEDURE — 93005 ELECTROCARDIOGRAM TRACING: CPT | Performed by: INTERNAL MEDICINE

## 2024-11-01 PROCEDURE — 84484 ASSAY OF TROPONIN QUANT: CPT | Performed by: STUDENT IN AN ORGANIZED HEALTH CARE EDUCATION/TRAINING PROGRAM

## 2024-11-01 PROCEDURE — 83880 ASSAY OF NATRIURETIC PEPTIDE: CPT | Performed by: STUDENT IN AN ORGANIZED HEALTH CARE EDUCATION/TRAINING PROGRAM

## 2024-11-01 PROCEDURE — 82565 ASSAY OF CREATININE: CPT

## 2024-11-01 PROCEDURE — 80061 LIPID PANEL: CPT | Performed by: STUDENT IN AN ORGANIZED HEALTH CARE EDUCATION/TRAINING PROGRAM

## 2024-11-01 PROCEDURE — 80053 COMPREHEN METABOLIC PANEL: CPT | Performed by: PHYSICIAN ASSISTANT

## 2024-11-01 PROCEDURE — 25500020 PHARM REV CODE 255: Performed by: STUDENT IN AN ORGANIZED HEALTH CARE EDUCATION/TRAINING PROGRAM

## 2024-11-01 RX ADMIN — IOHEXOL 100 ML: 350 INJECTION, SOLUTION INTRAVENOUS at 10:11

## 2024-11-02 PROBLEM — R42 LIGHT HEADED: Status: ACTIVE | Noted: 2024-11-02

## 2024-11-02 LAB
ALBUMIN SERPL BCP-MCNC: 4 G/DL (ref 3.5–5.2)
ALP SERPL-CCNC: 82 U/L (ref 55–135)
ALT SERPL W/O P-5'-P-CCNC: 8 U/L (ref 10–44)
ANION GAP SERPL CALC-SCNC: 10 MMOL/L (ref 8–16)
AST SERPL-CCNC: 9 U/L (ref 10–40)
BASOPHILS # BLD AUTO: 0.06 K/UL (ref 0–0.2)
BASOPHILS NFR BLD: 0.4 % (ref 0–1.9)
BILIRUB SERPL-MCNC: 0.4 MG/DL (ref 0.1–1)
BUN SERPL-MCNC: 12 MG/DL (ref 6–20)
CALCIUM SERPL-MCNC: 9.1 MG/DL (ref 8.7–10.5)
CHLORIDE SERPL-SCNC: 106 MMOL/L (ref 95–110)
CHOLEST SERPL-MCNC: 162 MG/DL (ref 120–199)
CHOLEST/HDLC SERPL: 3.4 {RATIO} (ref 2–5)
CO2 SERPL-SCNC: 20 MMOL/L (ref 23–29)
CREAT SERPL-MCNC: 0.9 MG/DL (ref 0.5–1.4)
CRP SERPL-MCNC: 2.9 MG/DL
DIFFERENTIAL METHOD BLD: ABNORMAL
EOSINOPHIL # BLD AUTO: 0.1 K/UL (ref 0–0.5)
EOSINOPHIL NFR BLD: 0.4 % (ref 0–8)
ERYTHROCYTE [DISTWIDTH] IN BLOOD BY AUTOMATED COUNT: 15 % (ref 11.5–14.5)
EST. GFR  (NO RACE VARIABLE): >60 ML/MIN/1.73 M^2
ESTIMATED AVG GLUCOSE: 120 MG/DL (ref 68–131)
GLUCOSE SERPL-MCNC: 139 MG/DL (ref 70–110)
HBA1C MFR BLD: 5.8 % (ref 4.5–6.2)
HCT VFR BLD AUTO: 32.8 % (ref 40–54)
HDLC SERPL-MCNC: 48 MG/DL (ref 40–75)
HDLC SERPL: 29.6 % (ref 20–50)
HGB BLD-MCNC: 10.1 G/DL (ref 14–18)
IMM GRANULOCYTES # BLD AUTO: 0.09 K/UL (ref 0–0.04)
IMM GRANULOCYTES NFR BLD AUTO: 0.6 % (ref 0–0.5)
LDLC SERPL CALC-MCNC: 88.4 MG/DL (ref 63–159)
LYMPHOCYTES # BLD AUTO: 0.8 K/UL (ref 1–4.8)
LYMPHOCYTES NFR BLD: 5.8 % (ref 18–48)
MAGNESIUM SERPL-MCNC: 1.5 MG/DL (ref 1.6–2.6)
MCH RBC QN AUTO: 28.5 PG (ref 27–31)
MCHC RBC AUTO-ENTMCNC: 30.8 G/DL (ref 32–36)
MCV RBC AUTO: 92 FL (ref 82–98)
MONOCYTES # BLD AUTO: 0.2 K/UL (ref 0.3–1)
MONOCYTES NFR BLD: 1.1 % (ref 4–15)
NEUTROPHILS # BLD AUTO: 13 K/UL (ref 1.8–7.7)
NEUTROPHILS NFR BLD: 91.7 % (ref 38–73)
NONHDLC SERPL-MCNC: 114 MG/DL
NRBC BLD-RTO: 0 /100 WBC
PLATELET # BLD AUTO: 262 K/UL (ref 150–450)
PMV BLD AUTO: 10.5 FL (ref 9.2–12.9)
POTASSIUM SERPL-SCNC: 4.6 MMOL/L (ref 3.5–5.1)
PROT SERPL-MCNC: 7.3 G/DL (ref 6–8.4)
RBC # BLD AUTO: 3.55 M/UL (ref 4.6–6.2)
SODIUM SERPL-SCNC: 136 MMOL/L (ref 136–145)
TRIGL SERPL-MCNC: 128 MG/DL (ref 30–150)
TROPONIN I SERPL HS-MCNC: 3.8 PG/ML (ref 0–14.9)
TSH SERPL DL<=0.005 MIU/L-ACNC: 0.52 UIU/ML (ref 0.34–5.6)
WBC # BLD AUTO: 14.21 K/UL (ref 3.9–12.7)

## 2024-11-02 PROCEDURE — G0378 HOSPITAL OBSERVATION PER HR: HCPCS

## 2024-11-02 PROCEDURE — 96366 THER/PROPH/DIAG IV INF ADDON: CPT

## 2024-11-02 PROCEDURE — 63600175 PHARM REV CODE 636 W HCPCS: Performed by: INTERNAL MEDICINE

## 2024-11-02 PROCEDURE — 25000003 PHARM REV CODE 250: Performed by: HOSPITALIST

## 2024-11-02 PROCEDURE — 96374 THER/PROPH/DIAG INJ IV PUSH: CPT

## 2024-11-02 PROCEDURE — 25000003 PHARM REV CODE 250: Performed by: INTERNAL MEDICINE

## 2024-11-02 PROCEDURE — 96375 TX/PRO/DX INJ NEW DRUG ADDON: CPT

## 2024-11-02 PROCEDURE — 83735 ASSAY OF MAGNESIUM: CPT | Performed by: INTERNAL MEDICINE

## 2024-11-02 PROCEDURE — 96365 THER/PROPH/DIAG IV INF INIT: CPT

## 2024-11-02 PROCEDURE — 96361 HYDRATE IV INFUSION ADD-ON: CPT

## 2024-11-02 PROCEDURE — 63600175 PHARM REV CODE 636 W HCPCS: Performed by: STUDENT IN AN ORGANIZED HEALTH CARE EDUCATION/TRAINING PROGRAM

## 2024-11-02 PROCEDURE — 63600175 PHARM REV CODE 636 W HCPCS: Performed by: HOSPITALIST

## 2024-11-02 PROCEDURE — 83036 HEMOGLOBIN GLYCOSYLATED A1C: CPT | Performed by: INTERNAL MEDICINE

## 2024-11-02 PROCEDURE — 36415 COLL VENOUS BLD VENIPUNCTURE: CPT | Performed by: INTERNAL MEDICINE

## 2024-11-02 PROCEDURE — 96376 TX/PRO/DX INJ SAME DRUG ADON: CPT

## 2024-11-02 PROCEDURE — 80053 COMPREHEN METABOLIC PANEL: CPT | Performed by: INTERNAL MEDICINE

## 2024-11-02 PROCEDURE — 85025 COMPLETE CBC W/AUTO DIFF WBC: CPT | Performed by: INTERNAL MEDICINE

## 2024-11-02 RX ORDER — TALC
6 POWDER (GRAM) TOPICAL NIGHTLY PRN
Status: DISCONTINUED | OUTPATIENT
Start: 2024-11-02 | End: 2024-11-04 | Stop reason: HOSPADM

## 2024-11-02 RX ORDER — SERTRALINE HYDROCHLORIDE 50 MG/1
100 TABLET, FILM COATED ORAL DAILY
Status: DISCONTINUED | OUTPATIENT
Start: 2024-11-02 | End: 2024-11-04 | Stop reason: HOSPADM

## 2024-11-02 RX ORDER — AMOXICILLIN 250 MG
1 CAPSULE ORAL DAILY
Status: DISCONTINUED | OUTPATIENT
Start: 2024-11-02 | End: 2024-11-04 | Stop reason: HOSPADM

## 2024-11-02 RX ORDER — LANOLIN ALCOHOL/MO/W.PET/CERES
800 CREAM (GRAM) TOPICAL
Status: DISCONTINUED | OUTPATIENT
Start: 2024-11-02 | End: 2024-11-04 | Stop reason: HOSPADM

## 2024-11-02 RX ORDER — IBUPROFEN 200 MG
16 TABLET ORAL
Status: DISCONTINUED | OUTPATIENT
Start: 2024-11-02 | End: 2024-11-03

## 2024-11-02 RX ORDER — SODIUM,POTASSIUM PHOSPHATES 280-250MG
2 POWDER IN PACKET (EA) ORAL
Status: DISCONTINUED | OUTPATIENT
Start: 2024-11-02 | End: 2024-11-04 | Stop reason: HOSPADM

## 2024-11-02 RX ORDER — ACETAMINOPHEN 325 MG/1
650 TABLET ORAL EVERY 4 HOURS PRN
Status: DISCONTINUED | OUTPATIENT
Start: 2024-11-02 | End: 2024-11-02

## 2024-11-02 RX ORDER — GLUCAGON 1 MG
1 KIT INJECTION
Status: DISCONTINUED | OUTPATIENT
Start: 2024-11-02 | End: 2024-11-03

## 2024-11-02 RX ORDER — LORAZEPAM 0.5 MG/1
0.5 TABLET ORAL EVERY 6 HOURS PRN
Status: DISCONTINUED | OUTPATIENT
Start: 2024-11-02 | End: 2024-11-04 | Stop reason: HOSPADM

## 2024-11-02 RX ORDER — ACETAMINOPHEN 325 MG/1
650 TABLET ORAL EVERY 8 HOURS PRN
Status: DISCONTINUED | OUTPATIENT
Start: 2024-11-02 | End: 2024-11-04 | Stop reason: HOSPADM

## 2024-11-02 RX ORDER — MAGNESIUM SULFATE HEPTAHYDRATE 40 MG/ML
2 INJECTION, SOLUTION INTRAVENOUS ONCE
Status: COMPLETED | OUTPATIENT
Start: 2024-11-02 | End: 2024-11-02

## 2024-11-02 RX ORDER — NALOXONE HCL 0.4 MG/ML
0.02 VIAL (ML) INJECTION
Status: DISCONTINUED | OUTPATIENT
Start: 2024-11-02 | End: 2024-11-04 | Stop reason: HOSPADM

## 2024-11-02 RX ORDER — ONDANSETRON HYDROCHLORIDE 2 MG/ML
4 INJECTION, SOLUTION INTRAVENOUS EVERY 6 HOURS PRN
Status: DISCONTINUED | OUTPATIENT
Start: 2024-11-02 | End: 2024-11-04 | Stop reason: HOSPADM

## 2024-11-02 RX ORDER — SERTRALINE HYDROCHLORIDE 50 MG/1
50 TABLET, FILM COATED ORAL DAILY
Status: DISCONTINUED | OUTPATIENT
Start: 2024-11-02 | End: 2024-11-04 | Stop reason: HOSPADM

## 2024-11-02 RX ORDER — MORPHINE SULFATE 4 MG/ML
4 INJECTION, SOLUTION INTRAMUSCULAR; INTRAVENOUS
Status: COMPLETED | OUTPATIENT
Start: 2024-11-02 | End: 2024-11-02

## 2024-11-02 RX ORDER — SODIUM CHLORIDE 0.9 % (FLUSH) 0.9 %
3 SYRINGE (ML) INJECTION EVERY 12 HOURS PRN
Status: DISCONTINUED | OUTPATIENT
Start: 2024-11-02 | End: 2024-11-04 | Stop reason: HOSPADM

## 2024-11-02 RX ORDER — ALUMINUM HYDROXIDE, MAGNESIUM HYDROXIDE, AND SIMETHICONE 1200; 120; 1200 MG/30ML; MG/30ML; MG/30ML
30 SUSPENSION ORAL 4 TIMES DAILY PRN
Status: DISCONTINUED | OUTPATIENT
Start: 2024-11-02 | End: 2024-11-04 | Stop reason: HOSPADM

## 2024-11-02 RX ORDER — ONDANSETRON HYDROCHLORIDE 2 MG/ML
4 INJECTION, SOLUTION INTRAVENOUS
Status: COMPLETED | OUTPATIENT
Start: 2024-11-02 | End: 2024-11-02

## 2024-11-02 RX ORDER — ZOLPIDEM TARTRATE 5 MG/1
10 TABLET ORAL NIGHTLY PRN
Status: DISCONTINUED | OUTPATIENT
Start: 2024-11-02 | End: 2024-11-04 | Stop reason: HOSPADM

## 2024-11-02 RX ORDER — METHYLPREDNISOLONE SOD SUCC 125 MG
125 VIAL (EA) INJECTION
Status: COMPLETED | OUTPATIENT
Start: 2024-11-02 | End: 2024-11-02

## 2024-11-02 RX ORDER — HYDROMORPHONE HYDROCHLORIDE 1 MG/ML
1 INJECTION, SOLUTION INTRAMUSCULAR; INTRAVENOUS; SUBCUTANEOUS EVERY 4 HOURS PRN
Status: DISCONTINUED | OUTPATIENT
Start: 2024-11-02 | End: 2024-11-03

## 2024-11-02 RX ORDER — FAMOTIDINE 10 MG/ML
20 INJECTION INTRAVENOUS 2 TIMES DAILY
Status: DISCONTINUED | OUTPATIENT
Start: 2024-11-02 | End: 2024-11-02

## 2024-11-02 RX ORDER — BUSPIRONE HYDROCHLORIDE 5 MG/1
15 TABLET ORAL 3 TIMES DAILY
Status: DISCONTINUED | OUTPATIENT
Start: 2024-11-02 | End: 2024-11-04 | Stop reason: HOSPADM

## 2024-11-02 RX ORDER — HYDROCODONE BITARTRATE AND ACETAMINOPHEN 5; 325 MG/1; MG/1
2 TABLET ORAL EVERY 6 HOURS PRN
Status: DISCONTINUED | OUTPATIENT
Start: 2024-11-02 | End: 2024-11-04 | Stop reason: HOSPADM

## 2024-11-02 RX ORDER — HYDROCODONE BITARTRATE AND ACETAMINOPHEN 5; 325 MG/1; MG/1
2 TABLET ORAL EVERY 6 HOURS PRN
Status: DISCONTINUED | OUTPATIENT
Start: 2024-11-02 | End: 2024-11-02

## 2024-11-02 RX ORDER — MORPHINE SULFATE 2 MG/ML
2 INJECTION, SOLUTION INTRAMUSCULAR; INTRAVENOUS EVERY 30 MIN PRN
Status: DISCONTINUED | OUTPATIENT
Start: 2024-11-02 | End: 2024-11-02

## 2024-11-02 RX ORDER — METOPROLOL SUCCINATE 50 MG/1
50 TABLET, EXTENDED RELEASE ORAL DAILY
Status: DISCONTINUED | OUTPATIENT
Start: 2024-11-02 | End: 2024-11-04 | Stop reason: HOSPADM

## 2024-11-02 RX ORDER — IBUPROFEN 200 MG
24 TABLET ORAL
Status: DISCONTINUED | OUTPATIENT
Start: 2024-11-02 | End: 2024-11-03

## 2024-11-02 RX ADMIN — METOPROLOL SUCCINATE 50 MG: 50 TABLET, FILM COATED, EXTENDED RELEASE ORAL at 08:11

## 2024-11-02 RX ADMIN — METHYLPREDNISOLONE SODIUM SUCCINATE 40 MG: 40 INJECTION, POWDER, FOR SOLUTION INTRAMUSCULAR; INTRAVENOUS at 08:11

## 2024-11-02 RX ADMIN — MORPHINE SULFATE 4 MG: 4 INJECTION, SOLUTION INTRAMUSCULAR; INTRAVENOUS at 01:11

## 2024-11-02 RX ADMIN — FAMOTIDINE 20 MG: 10 INJECTION, SOLUTION INTRAVENOUS at 01:11

## 2024-11-02 RX ADMIN — HYDROCODONE BITARTRATE AND ACETAMINOPHEN 2 TABLET: 5; 325 TABLET ORAL at 04:11

## 2024-11-02 RX ADMIN — BUSPIRONE HYDROCHLORIDE 15 MG: 5 TABLET ORAL at 08:11

## 2024-11-02 RX ADMIN — ONDANSETRON 4 MG: 2 INJECTION INTRAMUSCULAR; INTRAVENOUS at 01:11

## 2024-11-02 RX ADMIN — MESALAMINE 1000 MG: 250 CAPSULE ORAL at 01:11

## 2024-11-02 RX ADMIN — ZOLPIDEM TARTRATE 10 MG: 5 TABLET, COATED ORAL at 08:11

## 2024-11-02 RX ADMIN — HYDROMORPHONE HYDROCHLORIDE 1 MG: 1 INJECTION, SOLUTION INTRAMUSCULAR; INTRAVENOUS; SUBCUTANEOUS at 08:11

## 2024-11-02 RX ADMIN — MESALAMINE 1000 MG: 250 CAPSULE ORAL at 04:11

## 2024-11-02 RX ADMIN — MAGNESIUM SULFATE HEPTAHYDRATE 2 G: 40 INJECTION, SOLUTION INTRAVENOUS at 07:11

## 2024-11-02 RX ADMIN — METHYLPREDNISOLONE SODIUM SUCCINATE 125 MG: 125 INJECTION, POWDER, FOR SOLUTION INTRAMUSCULAR; INTRAVENOUS at 01:11

## 2024-11-02 RX ADMIN — HYDROCODONE BITARTRATE AND ACETAMINOPHEN 2 TABLET: 5; 325 TABLET ORAL at 10:11

## 2024-11-02 RX ADMIN — SENNOSIDES AND DOCUSATE SODIUM 1 TABLET: 8.6; 5 TABLET ORAL at 08:11

## 2024-11-02 RX ADMIN — HYDROCODONE BITARTRATE AND ACETAMINOPHEN 2 TABLET: 5; 325 TABLET ORAL at 11:11

## 2024-11-02 RX ADMIN — MESALAMINE 1000 MG: 250 CAPSULE ORAL at 09:11

## 2024-11-02 RX ADMIN — Medication 6 MG: at 11:11

## 2024-11-02 RX ADMIN — ONDANSETRON 4 MG: 2 INJECTION INTRAMUSCULAR; INTRAVENOUS at 09:11

## 2024-11-02 RX ADMIN — MESALAMINE 1000 MG: 250 CAPSULE ORAL at 08:11

## 2024-11-02 RX ADMIN — SERTRALINE HYDROCHLORIDE 50 MG: 50 TABLET ORAL at 08:11

## 2024-11-02 RX ADMIN — RIVAROXABAN 20 MG: 10 TABLET, FILM COATED ORAL at 04:11

## 2024-11-02 RX ADMIN — HYDROMORPHONE HYDROCHLORIDE 1 MG: 1 INJECTION, SOLUTION INTRAMUSCULAR; INTRAVENOUS; SUBCUTANEOUS at 02:11

## 2024-11-02 RX ADMIN — SODIUM CHLORIDE, POTASSIUM CHLORIDE, SODIUM LACTATE AND CALCIUM CHLORIDE 1000 ML: 600; 310; 30; 20 INJECTION, SOLUTION INTRAVENOUS at 01:11

## 2024-11-02 RX ADMIN — METHYLPREDNISOLONE SODIUM SUCCINATE 40 MG: 40 INJECTION, POWDER, FOR SOLUTION INTRAMUSCULAR; INTRAVENOUS at 04:11

## 2024-11-02 RX ADMIN — BUSPIRONE HYDROCHLORIDE 15 MG: 5 TABLET ORAL at 02:11

## 2024-11-02 RX ADMIN — SERTRALINE HYDROCHLORIDE 100 MG: 50 TABLET ORAL at 08:11

## 2024-11-02 NOTE — ASSESSMENT & PLAN NOTE
Appears to be early stages of a UC Flare up     No blood   But pain and diarrhea last 6 days he said       Normally doesn't want to eat when this happens he says       PLAN    - I gave another liter IVF over this early morning    Then run  cc hour    - Solumedrol 40 mg IV Q8 start now     - add 5-ASA drug     MESALAMINE   1 gram PO QID start now     - pepcid IV BID      - clear liquids for now but we can advance to full liquids if he wants       - no ABX at this time.    ABX never really shown benefit .    Historically        - morphine 2 mg IV PRN Pain     - zofran IV PRN nausea

## 2024-11-02 NOTE — FIRST PROVIDER EVALUATION
Emergency Department TeleTriage Encounter Note      CHIEF COMPLAINT    Chief Complaint   Patient presents with    Dizziness     Patient report dizziness and falls for couple days, patient states that he has falling several times today.       VITAL SIGNS   Initial Vitals [11/01/24 1904]   BP Pulse Resp Temp SpO2   127/77 97 18 98.5 °F (36.9 °C) 100 %      MAP       --            ALLERGIES    Review of patient's allergies indicates:  No Known Allergies    PROVIDER TRIAGE NOTE  Patient presents with complaint of feeling dizzy for 48 hours.  Reports multiple falls.  Denies any visual changes, speech difficulty or other complaints.  Takes Xarelto for previous blood clots.      Phy:   Constitutional: well nourished, well developed, appearing stated age, NAD        Initial orders will be placed and care will be transferred to an alternate provider when patient is roomed for a full evaluation. Any additional orders and the final disposition will be determined by that provider.        ORDERS  Labs Reviewed - No data to display    ED Orders (720h ago, onward)      None              Virtual Visit Note: The provider triage portion of this emergency department evaluation and documentation was performed via [employer], a HIPAA-compliant telemedicine application, in concert with a tele-presenter in the room. A face to face patient evaluation with one of my colleagues will occur once the patient is placed in an emergency department room.      DISCLAIMER: This note was prepared with [employer] voice recognition transcription software. Garbled syntax, mangled pronouns, and other bizarre constructions may be attributed to that software system.

## 2024-11-02 NOTE — PLAN OF CARE
Problem: Adult Inpatient Plan of Care  Goal: Plan of Care Review  Outcome: Progressing  Goal: Patient-Specific Goal (Individualized)  Outcome: Progressing  Goal: Absence of Hospital-Acquired Illness or Injury  Outcome: Progressing  Goal: Optimal Comfort and Wellbeing  Outcome: Progressing  Goal: Readiness for Transition of Care  Outcome: Progressing     Problem: Bariatric Environmental Safety  Goal: Safety Maintained with Care  Outcome: Progressing     Problem: Diabetes Comorbidity  Goal: Blood Glucose Level Within Targeted Range  Outcome: Progressing     Problem: Sepsis/Septic Shock  Goal: Optimal Coping  Outcome: Progressing  Goal: Absence of Bleeding  Outcome: Progressing  Goal: Blood Glucose Level Within Targeted Range  Outcome: Progressing  Goal: Absence of Infection Signs and Symptoms  Outcome: Progressing  Goal: Optimal Nutrition Intake  Outcome: Progressing     Problem: Acute Kidney Injury/Impairment  Goal: Fluid and Electrolyte Balance  Outcome: Progressing  Goal: Improved Oral Intake  Outcome: Progressing  Goal: Effective Renal Function  Outcome: Progressing     Problem: Wound  Goal: Optimal Coping  Outcome: Progressing  Goal: Optimal Functional Ability  Outcome: Progressing  Goal: Absence of Infection Signs and Symptoms  Outcome: Progressing  Goal: Improved Oral Intake  Outcome: Progressing  Goal: Optimal Pain Control and Function  Outcome: Progressing  Goal: Skin Health and Integrity  Outcome: Progressing  Goal: Optimal Wound Healing  Outcome: Progressing

## 2024-11-02 NOTE — HPI
49-year-old history of diabetes hypertension hyperlipidemia ulcerative colitis presents for evaluation of dizziness described as feeling off balance and lightheadedness that has been ongoing since at least Wednesday.  He reports the sensation is constant.  He reports he has fallen twice due to being off balance.  He denies striking his head or LOC.  He denies any other injuries.  He denies headache chest pain.  He reports shortness of breath which is stable for him.  He reports some crampy abdominal pain over the last week or so, this feels like ulcerative colitis flare, this is a recurring problem for him.  He denies nausea or vomiting he denies blood in his stool.  He denies change in urination.  He takes Xarelto for an upper extremity DVT.  He denies having a stroke previously.     Non smoker  Non drinker  Lives alone at home  Active       He says this is how his UC flares start.  They usually have blood Bms     but so far NO  Blood this time yet .     Its been about 6 days of the pain and diarrhea .     He had 7 episodes of diarrhea the day prior     And it can be a lot more than that he said , per day       He has not been eating as  much but was drinking fluids normally he says         In our ER      He got 1 liter IVF Bolus     And some Solumedrol 125 mg IV       Regarding his dizziness   they did CT head, CTA head neck and MRI Brain   and all was NEGATIVE           Plan is to admit him for UC Flare and his dizziness

## 2024-11-02 NOTE — H&P
[facility]  History & Physical    Patient Name: Jacoby Jean-Baptiste  MRN: [id number]  Patient Class: OP- Observation  Admission Date: 11/1/2024  Attending Physician: Jake Fitzgerald MD  Primary Care Provider: Hunter Aguilar III, MD         Patient information was obtained from patient and ER records.     Subjective:     Principal Problem:Light headed    Chief Complaint:   Chief Complaint   Patient presents with    Dizziness     Patient report dizziness and falls for couple days, patient states that he has falling several times today.        HPI: 49-year-old history of diabetes hypertension hyperlipidemia ulcerative colitis presents for evaluation of dizziness described as feeling off balance and lightheadedness that has been ongoing since at least Wednesday.  He reports the sensation is constant.  He reports he has fallen twice due to being off balance.  He denies striking his head or LOC.  He denies any other injuries.  He denies headache chest pain.  He reports shortness of breath which is stable for him.  He reports some crampy abdominal pain over the last week or so, this feels like ulcerative colitis flare, this is a recurring problem for him.  He denies nausea or vomiting he denies blood in his stool.  He denies change in urination.  He takes Xarelto for an upper extremity DVT.  He denies having a stroke previously.     Non smoker  Non drinker  Lives alone at home  Active       He says this is how his UC flares start.  They usually have blood Bms     but so far NO  Blood this time yet .     Its been about 6 days of the pain and diarrhea .     He had 7 episodes of diarrhea the day prior     And it can be a lot more than that he said , per day       He has not been eating as  much but was drinking fluids normally he says         In our ER      He got 1 liter IVF Bolus     And some Solumedrol 125 mg IV       Regarding his dizziness   they did CT head, CTA head neck and MRI Brain    and all was NEGATIVE           Plan is to admit him for UC Flare and his dizziness     Past Medical History:   Diagnosis Date    C. difficile colitis     Cavitary pneumonia 11/2023    pos aspergillus serology    Diabetes mellitus 01/2022    Hyperlipidemia 2015    Hypertension 2015    Insomnia 2015    Ulcerative colitis        Past Surgical History:   Procedure Laterality Date    BRONCHOSCOPY WITH FLUOROSCOPY Left 11/20/2023    Procedure: BRONCHOSCOPY, WITH FLUOROSCOPY;  Surgeon: Lisa Villarreal MD;  Location: [facility];  Service: Pulmonary;  Laterality: Left;    BRONCHOSCOPY WITH FLUOROSCOPY N/A 11/30/2023    Procedure: BRONCHOSCOPY, WITH FLUOROSCOPY;  Surgeon: Lisa Villarreal MD;  Location: [facility];  Service: Pulmonary;  Laterality: N/A;    CARDIAC ELECTROPHYSIOLOGY MAPPING AND ABLATION  2017    SVT    CHOLECYSTECTOMY  2011    COLONOSCOPY N/A 5/3/2022    Procedure: COLONOSCOPY;  Surgeon: Shan Jean III, MD;  Location: [facility];  Service: Endoscopy;  Laterality: N/A;    COLONOSCOPY N/A 3/16/2024    Procedure: COLONOSCOPY;  Surgeon: ALEKSANDER Ramos MD;  Location: [facility];  Service: Endoscopy;  Laterality: N/A;    COLONOSCOPY WITH FECAL MICROBIOTA TRANSFER N/A 3/21/2023    Procedure: COLONOSCOPY, WITH FECAL MICROBIOTA TRANSFER;  Surgeon: David Millan MD;  Location: [facility];  Service: Endoscopy;  Laterality: N/A;    ESOPHAGOGASTRODUODENOSCOPY N/A 4/24/2023    Procedure: EGD (ESOPHAGOGASTRODUODENOSCOPY);  Surgeon: Shan Jean III, MD;  Location: [facility];  Service: Endoscopy;  Laterality: N/A;    ESOPHAGOGASTRODUODENOSCOPY N/A 6/5/2024    Procedure: EGD (ESOPHAGOGASTRODUODENOSCOPY);  Surgeon: Odalis Mccabe MD;  Location: [facility];  Service: Endoscopy;  Laterality: N/A;    FLEXIBLE SIGMOIDOSCOPY N/A 6/5/2024    Procedure: SIGMOIDOSCOPY, FLEXIBLE;  Surgeon: Odalis Mccabe MD;  Location: [facility];  Service: Endoscopy;  Laterality: N/A;    FLEXIBLE SIGMOIDOSCOPY N/A  7/16/2024    Procedure: SIGMOIDOSCOPY, FLEXIBLE;  Surgeon: Shan Jean III, MD;  Location: [facility];  Service: Endoscopy;  Laterality: N/A;    FLEXIBLE SIGMOIDOSCOPY N/A 8/13/2024    Procedure: SIGMOIDOSCOPY, FLEXIBLE;  Surgeon: Shan Jean III, MD;  Location: [facility] END[facility];  Service: Endoscopy;  Laterality: N/A;    GANGLION CYST EXCISION Left 1992    UMBILICAL HERNIA REPAIR  2011    with mesh       Review of patient's allergies indicates:  No Known Allergies    Current Facility-Administered Medications on File Prior to Encounter   Medication    lactated ringers infusion     Current Outpatient Medications on File Prior to Encounter   Medication Sig    budesonide (ENTOCORT EC) 3 mg capsule Take 9 mg by mouth once daily.    BUDESONIDE RECT Place 1 enema rectally 2 (two) times a day.    busPIRone (BUSPAR) 15 MG tablet Take 1 tablet (15 mg total) by mouth 3 (three) times daily.    cholestyramine (QUESTRAN) 4 gram packet Take 1 packet by mouth 3 (three) times daily.    colchicine (COLCRYS) 0.6 mg tablet Take 1 tablet (0.6 mg total) by mouth 2 (two) times daily.    ergocalciferol (VITAMIN D2) 50,000 unit Cap Take 1 capsule (50,000 Units total) by mouth every 7 days.    HYDROcodone-acetaminophen (NORCO) 5-325 mg per tablet Take 1 tablet by mouth every 6 (six) hours as needed for Pain.    hyoscyamine (LEVBID) 0.375 mg Tb12 Take 375 mcg by mouth 2 (two) times daily.    LORazepam (ATIVAN) 0.5 MG tablet Take 1 tablet (0.5 mg total) by mouth every 6 (six) hours as needed for Anxiety.    metoprolol succinate (TOPROL-XL) 50 MG 24 hr tablet Take 1 tablet (50 mg total) by mouth once daily.    pantoprazole (PROTONIX) 40 MG tablet Take 40 mg by mouth 2 (two) times daily.    promethazine (PHENERGAN) 12.5 MG Tab Take 12.5 mg by mouth 4 (four) times daily as needed.    rivaroxaban (XARELTO) 20 mg Tab Take 1 tablet (20 mg total) by mouth daily with dinner or evening meal.    sertraline (ZOLOFT) 100 MG tablet Take 1 tablet  (100 mg total) by mouth once daily.    sertraline (ZOLOFT) 50 MG tablet Take 1 tablet (50 mg total) by mouth once daily.    simvastatin (ZOCOR) 20 MG tablet Take 1 tablet (20 mg total) by mouth every evening. (Patient taking differently: Take 20 mg by mouth once daily.)    zolpidem (AMBIEN) 10 mg Tab Take 1 tablet (10 mg total) by mouth nightly as needed (insomnia).     Family History       Problem Relation (Age of Onset)    Asthma Mother          Tobacco Use    Smoking status: Former     Current packs/day: 1.00     Average packs/day: 1 pack/day for 31.8 years (31.8 ttl pk-yrs)     Types: Cigarettes     Start date: 1993    Smokeless tobacco: Never    Tobacco comments:     Pt states he has stopped smoking with Chantix three weeks ago. 12/1/23   Substance and Sexual Activity    Alcohol use: Yes     Comment: ocass    Drug use: Not Currently    Sexual activity: Not Currently     Review of Systems   All other systems reviewed and are negative.    Objective:     Vital Signs (Most Recent):  Temp: 98.2 °F (36.8 °C) (11/02/24 0310)  Pulse: 91 (11/02/24 0310)  Resp: 18 (11/02/24 0423)  BP: 138/84 (11/02/24 0310)  SpO2: 97 % (11/02/24 0310) Vital Signs (24h Range):  Temp:  [98.2 °F (36.8 °C)-98.5 °F (36.9 °C)] 98.2 °F (36.8 °C)  Pulse:  [87-97] 91  Resp:  [16-18] 18  SpO2:  [97 %-100 %] 97 %  BP: (127-165)/(60-84) 138/84     Weight: 133 kg (293 lb 3.4 oz)  Body mass index is 48.79 kg/m².     Physical Exam  Vitals and nursing note reviewed.   Constitutional:       Appearance: Normal appearance.   HENT:      Head: Normocephalic.      Nose: Nose normal.      Mouth/Throat:      Mouth: Mucous membranes are moist.   Eyes:      Extraocular Movements: Extraocular movements intact.      Pupils: Pupils are equal, round, and reactive to light.   Cardiovascular:      Rate and Rhythm: Normal rate.      Pulses: Normal pulses.      Heart sounds: Normal heart sounds.   Pulmonary:      Effort: Pulmonary effort is normal.      Breath sounds:  "Normal breath sounds.   Abdominal:      General: Abdomen is flat. Bowel sounds are normal.      Palpations: Abdomen is soft.      Tenderness: There is abdominal tenderness.   Musculoskeletal:         General: Normal range of motion.      Cervical back: Normal range of motion.   Skin:     General: Skin is warm.      Capillary Refill: Capillary refill takes less than 2 seconds.   Neurological:      General: No focal deficit present.      Mental Status: He is alert and oriented to person, place, and time.   Psychiatric:         Mood and Affect: Mood normal.         Behavior: Behavior normal.              CRANIAL NERVES     CN III, IV, VI   Pupils are equal, round, and reactive to light.       Significant Labs: All pertinent labs within the past 24 hours have been reviewed.  CBC:   Recent Labs   Lab 11/01/24 2204 11/02/24 0456   WBC 17.02* 14.21*   HGB 10.0* 10.1*   HCT 32.0* 32.8*    262     CMP:   Recent Labs   Lab 11/01/24 2204 11/02/24 0456    136   K 4.6 4.6    106   CO2 26 20*   GLU 93 139*   BUN 17 12   CREATININE 1.1 0.9   CALCIUM 9.3 9.1   PROT 7.4 7.3   ALBUMIN 4.0 4.0   BILITOT 0.3 0.4   ALKPHOS 70 82   AST 16 9*   ALT 8* 8*   ANIONGAP 8 10     Cardiac Markers:   Recent Labs   Lab 11/01/24 2204   BNP 49     Coagulation:   Recent Labs   Lab 11/01/24 2204   INR 1.0     Lactic Acid: No results for input(s): "LACTATE" in the last 48 hours.  Lipase:   Recent Labs   Lab 11/01/24 2204   LIPASE 9     Lipid Panel:   Recent Labs   Lab 11/01/24 2204   CHOL 162   HDL 48   LDLCALC 88.4   TRIG 128   CHOLHDL 29.6     Magnesium:   Recent Labs   Lab 11/02/24 0456   MG 1.5*     Prealbumin: No results for input(s): "PREALBUMIN" in the last 48 hours.  Respiratory Culture: No results for input(s): "GSRESP", "RESPIRATORYC" in the last 48 hours.  Troponin:   Recent Labs   Lab 11/01/24 2204   TROPONINIHS 3.8     TSH:   Recent Labs   Lab 11/01/24 2204   TSH 0.515     Urine Studies:   Recent Labs   Lab " 11/01/24 2023   COLORU Yellow   APPEARANCEUA Clear   PHUR 6.0   SPECGRAV 1.025   PROTEINUA Negative   GLUCUA Negative   KETONESU Negative   BILIRUBINUA Negative   OCCULTUA 3+*   NITRITE Negative   UROBILINOGEN Negative   LEUKOCYTESUR Negative   RBCUA >100*   WBCUA 2       Significant Imaging: I have reviewed all pertinent imaging results/findings within the past 24 hours.  I have reviewed and interpreted all pertinent imaging results/findings within the past 24 hours.  Assessment/Plan:     * Light headed  From volume loss dehydration I think       MRI Brain and CT  CTA brain all negative   And it started only 3 days ago and his UC symptoms Diarrhea started 6 days ago       PLAN    - IVFs       Ulcerative colitis    Appears to be early stages of a UC Flare up     No blood   But pain and diarrhea last 6 days he said       Normally doesn't want to eat when this happens he says       PLAN    - I gave another liter IVF over this early morning    Then run  cc hour    - Solumedrol 40 mg IV Q8 start now     - add 5-ASA drug     MESALAMINE   1 gram PO QID start now     - pepcid IV BID      - clear liquids for now but we can advance to full liquids if he wants       - no ABX at this time.    ABX never really shown benefit .    Historically        - morphine 2 mg IV PRN Pain     - zofran IV PRN nausea     Dehydration  From all the fluid loss from all the diarrhea last 6 days     I think that's why he was lightheaded also       This should resolve with IVFs        PLAN      - run IVFs   LR  200 cc hour x 1 more liter     Then run  cc hour x next 24 hours and reassess       - clear liquid diet for now      VTE Risk Mitigation (From admission, onward)           Ordered     rivaroxaban tablet 20 mg  With dinner         11/02/24 0121     Reason for No Pharmacological VTE Prophylaxis  Once        Question:  Reasons:  Answer:  Risk of Bleeding    11/02/24 0119     IP VTE HIGH RISK PATIENT  Once         11/02/24 0119      Place sequential compression device  Until discontinued         11/02/24 0119                       On 11/02/2024, patient should be placed in hospital observation services under my care.             Jake Fitzgerald MD  Department of Hospital Medicine  [facility]

## 2024-11-02 NOTE — ED PROVIDER NOTES
Encounter Date: 11/1/2024       History     Chief Complaint   Patient presents with    Dizziness     Patient report dizziness and falls for couple days, patient states that he has falling several times today.     HPI  49-year-old history of diabetes hypertension hyperlipidemia ulcerative colitis presents for evaluation of dizziness described as feeling off balance and lightheadedness that has been ongoing since at least Wednesday.  He reports the sensation is constant.  He reports he has fallen twice due to being off balance.  He denies striking his head or LOC.  He denies any other injuries.  He denies headache chest pain.  He reports shortness of breath which is stable for him.  He reports some crampy abdominal pain over the last week or so, this feels like ulcerative colitis flare, this is a recurring problem for him.  He denies nausea or vomiting he denies blood in his stool.  He denies change in urination.  He takes Xarelto for an upper extremity DVT.  He denies having a stroke previously.  Review of patient's allergies indicates:  No Known Allergies  Past Medical History:   Diagnosis Date    C. difficile colitis     Cavitary pneumonia 11/2023    pos aspergillus serology    Diabetes mellitus 01/2022    Hyperlipidemia 2015    Hypertension 2015    Insomnia 2015    Ulcerative colitis      Past Surgical History:   Procedure Laterality Date    BRONCHOSCOPY WITH FLUOROSCOPY Left 11/20/2023    Procedure: BRONCHOSCOPY, WITH FLUOROSCOPY;  Surgeon: Lisa Villarreal MD;  Location: [facility];  Service: Pulmonary;  Laterality: Left;    BRONCHOSCOPY WITH FLUOROSCOPY N/A 11/30/2023    Procedure: BRONCHOSCOPY, WITH FLUOROSCOPY;  Surgeon: Lisa Villarreal MD;  Location: [facility];  Service: Pulmonary;  Laterality: N/A;    CARDIAC ELECTROPHYSIOLOGY MAPPING AND ABLATION  2017    SVT    CHOLECYSTECTOMY  2011    COLONOSCOPY N/A 5/3/2022    Procedure: COLONOSCOPY;  Surgeon: Shan Jean III, MD;  Location: [facility];   Service: Endoscopy;  Laterality: N/A;    COLONOSCOPY N/A 3/16/2024    Procedure: COLONOSCOPY;  Surgeon: ALEKSANDER Ramos MD;  Location: [facility];  Service: Endoscopy;  Laterality: N/A;    COLONOSCOPY WITH FECAL MICROBIOTA TRANSFER N/A 3/21/2023    Procedure: COLONOSCOPY, WITH FECAL MICROBIOTA TRANSFER;  Surgeon: David Millan MD;  Location: [facility];  Service: Endoscopy;  Laterality: N/A;    ESOPHAGOGASTRODUODENOSCOPY N/A 4/24/2023    Procedure: EGD (ESOPHAGOGASTRODUODENOSCOPY);  Surgeon: Shan Jean III, MD;  Location: [facility];  Service: Endoscopy;  Laterality: N/A;    ESOPHAGOGASTRODUODENOSCOPY N/A 6/5/2024    Procedure: EGD (ESOPHAGOGASTRODUODENOSCOPY);  Surgeon: Odalis Mccabe MD;  Location: [facility];  Service: Endoscopy;  Laterality: N/A;    FLEXIBLE SIGMOIDOSCOPY N/A 6/5/2024    Procedure: SIGMOIDOSCOPY, FLEXIBLE;  Surgeon: Odalis Mccabe MD;  Location: [facility];  Service: Endoscopy;  Laterality: N/A;    FLEXIBLE SIGMOIDOSCOPY N/A 7/16/2024    Procedure: SIGMOIDOSCOPY, FLEXIBLE;  Surgeon: Shan Jean III, MD;  Location: [facility];  Service: Endoscopy;  Laterality: N/A;    FLEXIBLE SIGMOIDOSCOPY N/A 8/13/2024    Procedure: SIGMOIDOSCOPY, FLEXIBLE;  Surgeon: Shan Jean III, MD;  Location: [facility];  Service: Endoscopy;  Laterality: N/A;    GANGLION CYST EXCISION Left 1992    UMBILICAL HERNIA REPAIR  2011    with mesh     Family History   Problem Relation Name Age of Onset    Asthma Mother       Social History     Tobacco Use    Smoking status: Former     Current packs/day: 1.00     Average packs/day: 1 pack/day for 31.8 years (31.8 ttl pk-yrs)     Types: Cigarettes     Start date: 1993    Smokeless tobacco: Never    Tobacco comments:     Pt states he has stopped smoking with Chantix three weeks ago. 12/1/23   Substance Use Topics    Alcohol use: Yes     Comment: ocass    Drug use: Not Currently     Review of Systems   All other systems reviewed and are  negative.      Physical Exam     Initial Vitals [11/01/24 1904]   BP Pulse Resp Temp SpO2   127/77 97 18 98.5 °F (36.9 °C) 100 %      MAP       --         Physical Exam    Nursing note and vitals reviewed.  Constitutional: He appears well-developed and well-nourished.   HENT:   Head: Normocephalic and atraumatic.   Eyes: Right eye exhibits no discharge. Left eye exhibits no discharge.   Neck: No tracheal deviation present.   Cardiovascular:  Normal rate and regular rhythm.           Pulmonary/Chest: Breath sounds normal. No stridor. No respiratory distress.   Abdominal: Abdomen is soft. Bowel sounds are normal. There is abdominal tenderness.   Mild diffuse abdominal tenderness to palpation without rebound or guarding   Musculoskeletal:         General: No edema.     Neurological: He is alert and oriented to person, place, and time.   AOx3 GCS 15  CN 2-12 normal  No upper extremity or lower extremity drift  Intact sensation to light touch  FtN normal  HtS normal  Ambulates slowly but steady appearing   Skin: Skin is warm and dry.         ED Course   Procedures  Labs Reviewed   CBC W/ AUTO DIFFERENTIAL - Abnormal       Result Value    WBC 17.02 (*)     RBC 3.42 (*)     Hemoglobin 10.0 (*)     Hematocrit 32.0 (*)     MCV 94      MCH 29.2      MCHC 31.3 (*)     RDW 15.3 (*)     Platelets 258      MPV 10.7      Immature Granulocytes 0.6 (*)     Gran # (ANC) 13.3 (*)     Immature Grans (Abs) 0.11 (*)     Lymph # 1.8      Mono # 1.5 (*)     Eos # 0.3      Baso # 0.07      nRBC 0      Gran % 78.2 (*)     Lymph % 10.3 (*)     Mono % 8.9      Eosinophil % 1.6      Basophil % 0.4      Differential Method Automated     COMPREHENSIVE METABOLIC PANEL - Abnormal    Sodium 140      Potassium 4.6      Chloride 106      CO2 26      Glucose 93      BUN 17      Creatinine 1.1      Calcium 9.3      Total Protein 7.4      Albumin 4.0      Total Bilirubin 0.3      Alkaline Phosphatase 70      AST 16      ALT 8 (*)     eGFR >60.0       Anion Gap 8     URINALYSIS, REFLEX TO URINE CULTURE - Abnormal    Specimen UA Urine, Clean Catch      Color, UA Yellow      Appearance, UA Clear      pH, UA 6.0      Specific Gravity, UA 1.025      Protein, UA Negative      Glucose, UA Negative      Ketones, UA Negative      Bilirubin (UA) Negative      Occult Blood UA 3+ (*)     Nitrite, UA Negative      Urobilinogen, UA Negative      Leukocytes, UA Negative      Narrative:     Specimen Source->Urine   URINALYSIS MICROSCOPIC - Abnormal    RBC, UA >100 (*)     WBC, UA 2      Microscopic Comment SEE COMMENT      Narrative:     Specimen Source->Urine   C-REACTIVE PROTEIN - Abnormal    CRP 2.90 (*)    LIPASE    Lipase 9     PROTIME-INR    Prothrombin Time 10.9      INR 1.0     TSH    TSH 0.515     LIPID PANEL    Cholesterol 162      Triglycerides 128      HDL 48      LDL Cholesterol 88.4      HDL/Cholesterol Ratio 29.6      Total Cholesterol/HDL Ratio 3.4      Non-HDL Cholesterol 114     B-TYPE NATRIURETIC PEPTIDE    BNP 49     TROPONIN I HIGH SENSITIVITY    Troponin I High Sensitivity 3.8     COMPREHENSIVE METABOLIC PANEL   MAGNESIUM   CBC W/ AUTO DIFFERENTIAL   HEMOGLOBIN A1C   ISTAT CREATININE    POC Creatinine 1.1      Sample VENOUS            Imaging Results              MRI Brain Without Contrast (Final result)  Result time 11/01/24 23:38:17      Final result by Russ Medina MD (11/01/24 23:38:17)                   Impression:      No acute abnormality.      Electronically signed by: Russ Medina MD  Date:    11/01/2024  Time:    23:38               Narrative:    EXAMINATION:  MRI BRAIN WITHOUT CONTRAST    CLINICAL HISTORY:  Dizziness, persistent/recurrent, cardiac or vascular cause suspected;Acute focal neurological deficit;.    TECHNIQUE:  Multiplanar multisequence MR imaging of the brain was performed without contrast.    COMPARISON:  CTA head neck 11/01/2024.    FINDINGS:  Intracranial compartment:    Ventricles and sulci are normal in size for age  without evidence of hydrocephalus. No extra-axial blood or fluid collections.    The brain parenchyma appears normal. No mass lesion, acute hemorrhage, edema or acute infarct.    Normal vascular flow voids are preserved.    Skull/extracranial contents (limited evaluation): Bone marrow signal intensity is normal.                                       CT Abdomen Pelvis With IV Contrast NO Oral Contrast (Final result)  Result time 11/01/24 23:03:39      Final result by Kirk Dooley MD (11/01/24 23:03:39)                   Impression:      Abnormal segment of descending and sigmoid colon demonstrating wall thickening and pericolonic inflammatory change suggestive of a nonspecific colitis including infectious, inflammatory, or ischemic etiologies.  Findings appear similar compared to prior study of 09/28/2024.    Nonobstructing left-sided nephrolithiasis.    Additional findings as above.      Electronically signed by: Kirk Dooley MD  Date:    11/01/2024  Time:    23:03               Narrative:    EXAMINATION:  CT ABDOMEN PELVIS WITH IV CONTRAST    CLINICAL HISTORY:  Abdominal abscess/infection suspected;UC flare;    TECHNIQUE:  Low dose axial images, sagittal and coronal reformations were obtained from the lung bases to the pubic symphysis following the IV administration of 100 mL of Omnipaque 350 .  Oral contrast was not given.    COMPARISON:  09/28/2024    FINDINGS:  The lung bases are unremarkable.  There is no pleural fluid present.  The visualized portions of the heart appear normal.    Please note there is streak artifact from the patient's upper extremities limiting assessment of the abdominal contents.  The liver is enlarged.  No focal hepatic abnormality appreciated.  The gallbladder is surgically absent.  There is no intra-or extrahepatic biliary ductal dilatation.    Stomach appears within normal limits.  There is a duodenal diverticulum present.  The spleen, pancreas, and adrenal glands demonstrate  no acute abnormality.    Kidneys are normal in size and location.  There is nonobstructing left-sided nephrolithiasis.  No evidence of hydronephrosis.  The urinary bladder is unremarkable. The prostate demonstrates no significant abnormality.    The abdominal aorta is normal in course and caliber with mild atherosclerotic calcification along its course.  There is no retroperitoneal hematoma.    There is no evidence of small-bowel obstruction.  There is a segment of descending and sigmoid colon demonstrating mild wall thickening and pericolonic inflammatory change suggestive of a nonspecific colitis, similar to prior study of 09/28/2024. There is no free intraperitoneal air or abscess identified.  No CT evidence of acute appendicitis.  There is no portal venous gas.  There are scattered shotty small mesenteric and retroperitoneal lymph nodes.    Osseous structures demonstrate degenerative changes.  The extraperitoneal soft tissues are unremarkable.                                       CTA Head and Neck (xpd) (Final result)  Result time 11/01/24 23:14:51      Final result by Kirk Dooley MD (11/01/24 23:14:51)                   Impression:      Suboptimal evaluation of the arterial vasculature secondary to poor opacification.  Allowing for exam limitations, no convincing evidence of high-grade stenosis or large vessel occlusion at the level of the [id number].    Mild atherosclerosis of the head and neck vasculature as above.      Electronically signed by: Kirk Dooley MD  Date:    11/01/2024  Time:    23:14               Narrative:    EXAMINATION:  CTA HEAD AND NECK (XPD)    CLINICAL HISTORY:  Dizziness, persistent/recurrent, cardiac or vascular cause suspected;    TECHNIQUE:  CT angiogram was performed from the level of the greg to the top of the head following the IV administration of 100mL of Omnipaque 350.   Sagittal and coronal reconstructions and maximum intensity projection reconstructions  were performed. Arterial stenosis percentages are based on NASCET measurement criteria.    COMPARISON:  CTA head 11/19/2023, CT head without contrast 11/01/2024    FINDINGS:  Please note evaluation is limited secondary to poor opacification of the arterial vasculature limiting assessment.    Aorta: Normal 3 vessel arch.    Extracranial carotid circulation: Bilateral common carotid arteries are patent.  There is atherosclerotic calcification of the carotid bifurcations, right greater than left, without evidence of a hemodynamically significant stenosis.    Extracranial vertebral circulation: There is atherosclerotic calcification at the origin of the right vertebral artery.  The bilateral extracranial vertebral arteries appear patent without evidence of high-grade stenosis or occlusion allowing for poor opacification.    Intracranial Arteries: There is mild atherosclerotic calcification involving the cavernous segments of the bilateral ICAs.  The bilateral MCA and PANDA appear grossly patent without definite evidence of proximal large vessel occlusion allowing for suboptimal opacification.    The left vertebral artery is dominant.  There is mild atherosclerotic calcification involving the V4 segments of the bilateral distal vertebral arteries.  The vertebral arteries and basilar artery appear grossly patent.  The bilateral PCAs appear grossly patent without evidence of proximal large vessel occlusion allowing for poor opacification.    Venous structures (limited evaluation): Normal.    Non-Vascular Structures of the Neck/Thoracic Inlet (limited evaluation): The parotid and submandibular glands appear within normal limits.  No bulky cervical chain lymph node enlargement identified.  The thyroid gland appears within normal limits.  The trachea is midline and patent.  Visualized lungs demonstrate no confluent airspace consolidation or pneumothorax.  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  There  are degenerative change of the visualized cervical spine.                                       X-Ray Chest AP Portable (Final result)  Result time 11/01/24 22:48:50   Procedure changed from X-Ray Chest PA And Lateral     Final result by Kirk Dooley MD (11/01/24 22:48:50)                   Impression:      Stable enlargement of the cardiomediastinal silhouette.  No convincing radiographic evidence of acute intrathoracic process on this single view.      Electronically signed by: Kirk Dooley MD  Date:    11/01/2024  Time:    22:48               Narrative:    EXAMINATION:  XR CHEST AP PORTABLE    CLINICAL HISTORY:  dizziness; Dizziness and giddiness    TECHNIQUE:  Single frontal view of the chest was performed.    COMPARISON:  09/12/2024    FINDINGS:  Cardiomediastinal silhouette is enlarged, similar to prior examination.  Stable radiograph appearance of the pulmonary vasculature.  Lungs are symmetrically expanded without evidence of new large confluent airspace consolidation.  No significant volume of pleural fluid or pneumothorax identified.  Osseous structures demonstrate mild degenerative changes.                                       CT Head Without Contrast (Final result)  Result time 11/01/24 20:48:42      Final result by Kirk Dooley MD (11/01/24 20:48:42)                   Impression:      No CT evidence of acute intracranial abnormality. Clinical correlation and further evaluation as warranted.      Electronically signed by: Kirk Dooley MD  Date:    11/01/2024  Time:    20:48               Narrative:    EXAMINATION:  CT HEAD WITHOUT CONTRAST    CLINICAL HISTORY:  Dizziness, persistent/recurrent, cardiac or vascular cause suspected;    TECHNIQUE:  Low dose axial images were obtained through the head.  Coronal and sagittal reformations were also performed. Contrast was not administered.    COMPARISON:  CT head 11/19/2023    FINDINGS:  There is no acute intracranial hemorrhage,  hydrocephalus, midline shift or mass effect. Gray-white matter differentiation appears maintained. The basal cisterns are patent. The paranasal sinuses appear well aerated without evidence of air-fluid level.  There is partial opacification of the right mastoid air cells, similar to prior study.  No displaced calvarial fracture identified.                                       Medications   lactated ringers bolus 1,000 mL (1,000 mLs Intravenous New Bag 11/2/24 0131)   famotidine (PF) injection 20 mg (20 mg Intravenous Given 11/2/24 0130)   sodium chloride 0.9% flush 3 mL (has no administration in time range)   melatonin tablet 6 mg (has no administration in time range)   ondansetron injection 4 mg (has no administration in time range)   senna-docusate 8.6-50 mg per tablet 1 tablet (has no administration in time range)   acetaminophen tablet 650 mg (has no administration in time range)   aluminum-magnesium hydroxide-simethicone 200-200-20 mg/5 mL suspension 30 mL (has no administration in time range)   acetaminophen tablet 650 mg (has no administration in time range)   HYDROcodone-acetaminophen 5-325 mg per tablet 2 tablet (has no administration in time range)   naloxone 0.4 mg/mL injection 0.02 mg (has no administration in time range)   potassium bicarbonate disintegrating tablet 50 mEq (has no administration in time range)   potassium bicarbonate disintegrating tablet 35 mEq (has no administration in time range)   potassium bicarbonate disintegrating tablet 60 mEq (has no administration in time range)   magnesium oxide tablet 800 mg (has no administration in time range)   magnesium oxide tablet 800 mg (has no administration in time range)   potassium, sodium phosphates 280-160-250 mg packet 2 packet (has no administration in time range)   potassium, sodium phosphates 280-160-250 mg packet 2 packet (has no administration in time range)   potassium, sodium phosphates 280-160-250 mg packet 2 packet (has no  administration in time range)   glucose chewable tablet 16 g (has no administration in time range)   glucose chewable tablet 24 g (has no administration in time range)   dextrose 50% injection 12.5 g (has no administration in time range)   dextrose 50% injection 25 g (has no administration in time range)   glucagon (human recombinant) injection 1 mg (has no administration in time range)   busPIRone tablet 15 mg (has no administration in time range)   LORazepam tablet 0.5 mg (has no administration in time range)   metoprolol succinate (TOPROL-XL) 24 hr tablet 50 mg (has no administration in time range)   rivaroxaban tablet 20 mg (has no administration in time range)   sertraline tablet 100 mg (has no administration in time range)   sertraline tablet 50 mg (has no administration in time range)   zolpidem tablet 10 mg (has no administration in time range)   methylPREDNISolone sodium succinate injection 40 mg (has no administration in time range)   iohexoL (OMNIPAQUE 350) injection 100 mL (100 mLs Intravenous Given 11/1/24 2244)   methylPREDNISolone sodium succinate injection 125 mg (125 mg Intravenous Given 11/2/24 0129)   morphine injection 4 mg (4 mg Intravenous Given 11/2/24 0130)   ondansetron injection 4 mg (4 mg Intravenous Given 11/2/24 0130)     Medical Decision Making  49-year-old male with dizziness and lightheadedness since Wednesday resulting in fall, he also reports some abdominal pain which is a recurrent issue for him related to his ulcerative colitis he denies blood in his stool, his vital signs are within normal limits he is afebrile on exam he is neurologically intact he is able to ambulate, differential includes CVA metabolic or endocrine derangement UC flare other intra-abdominal processes not excluded, arrhythmia.  Other intracranial process is not excluded.  We will obtain a subacute stroke workup and CT his belly.    Amount and/or Complexity of Data Reviewed  Labs: ordered.  Radiology: ordered  and independent interpretation performed. Decision-making details documented in ED Course.  ECG/medicine tests: ordered and independent interpretation performed.     Details: EKG on my independent interpretation normal sinus rhythm at approximately 90 beats per minute normal axis, QTC less than 500, no STEMI    Risk  Decision regarding hospitalization.               ED Course as of 11/02/24 0209   Fri Nov 01, 2024 2051 CT head of my independent interpretation without focal hyperdensity consistent with intracranial hemorrhage [IC]   2349 WBC(!): 17.02  It was 16 a month ago [IC]   Sat Nov 02, 2024 0208 I will cover him with steroids for UC flare, do not think he needs antibiotics at this time,, symptomatic treatment with Zofran and some pain medicine.  As he is so symptomatic I think it is reasonable to admit him to the hospital for further monitoring and evaluation.  He is in agreement with this plan I do not think his labs and vital signs are represent bacterial sepsis requiring antibiotics at this time. [IC]      ED Course User Index  [IC] Galdino Kearns MD                           Clinical Impression:  Final diagnoses:  [R42] Light headed  [R29.818] Acute focal neurological deficit  [R42] Dizziness          ED Disposition Condition    Observation                 Galdino Kearns MD  11/02/24 0209

## 2024-11-02 NOTE — NURSING
11/2/24 0353:pt aao. Respirations even and unlabored. No c/o pain nor sob. Hob elevated. Dsg to left upper inner arm that pt refused to have undressed and viewed by this nurse; he stated r/t HH had packed it on yesterday;he did not want dsg removed right now; pt explained about we need to view wound; pt still refused. Bed low locked. Fall precautions in place. Pt refused to have bed alarm on; he voiced no dizziness; ambulated to bathroom; steady. Ivf's infusing per md order. NAD was noted further.

## 2024-11-02 NOTE — ASSESSMENT & PLAN NOTE
From all the fluid loss from all the diarrhea last 6 days     I think that's why he was lightheaded also       This should resolve with IVFs        PLAN      - run IVFs   LR  200 cc hour x 1 more liter     Then run  cc hour x next 24 hours and reassess       - clear liquid diet for now

## 2024-11-02 NOTE — PLAN OF CARE
[facility]  Initial Discharge Assessment       Primary Care Provider: Hunter Aguilar III, MD    Admission Diagnosis: Light headed [R42]    Admission Date: 11/1/2024  Expected Discharge Date:     Information verified as correct on facesheet. The assessment was completed at the patient's bedside.  Pt lives with alone . Pt does not have a Living Will or Advance Directive. The Pt is oriented to person, places, and times. PCP last seen 09/27/2024  Pt denies Coumadin but is on  Xarelto , dialysis, DME, and has not been readmitted into the hospital in the last thirty days. [facility]  Pt is capable of performing ADLs without assistance. Pt drives to and from medical appointments. Pt reports he takes medication as prescribed; pharmacy used [facility].  Pt verbalized plan to discharge home via self transport. Pt has no other needs to be addressed at this time. CM reviewed the chart and will continue to monitor.      Transition of Care Barriers: None    Payor: [employer] / Plan: [employer] MEDICAL RESOURCES (UMR) / Product Type: Commercial /     Extended Emergency Contact Information  Primary Emergency Contact: Estiven (Sister-In-Law)Jessica  Address: [address] [place] [address] [facility]  Home Phone: [phone]  Mobile Phone: [phone]  Relation: Sister  Preferred language: English   needed? No  Secondary Emergency Contact: Dallas Jean-Baptiste   [place]  Mobile Phone: [phone]  Relation: Brother  Preferred language: English   needed? No    Discharge Plan A: Home Health  Discharge Plan B: Home      [facility] - [place], [place] - [address]  [address] [facility] [address]  Phone: [phone] Fax: [phone]    [facility]  [address]  [facility] [address]  Phone: [phone] Fax: [phone]    [id number] - [place] - [address] [facility] [address]  [address]  [place] [address]  Phone: [phone] Fax: [phone]      Initial Assessment (most recent)       Adult Discharge Assessment - 11/02/24 1005          Discharge Assessment    Assessment Type Discharge Planning Assessment     Confirmed/corrected address, phone number and insurance Yes     Confirmed Demographics Correct on Facesheet     Source of Information patient;health record     When was your last doctors appointment? 09/27/24     Does patient/caregiver understand observation status Yes     Reason For Admission Dizziness     People in Home alone     Facility Arrived From: Home     Do you expect to return to your current living situation? Yes     Do you have help at home or someone to help you manage your care at home? Yes     Who are your caregiver(s) and their phone number(s)? Estiven (Sister-In-Law),Jessica (Sister)  [phone] (Mobile), Dallas Jean-Baptiste(Brother)     Prior to hospitilization cognitive status: Alert/Oriented     Current cognitive status: Alert/Oriented     Walking or Climbing Stairs Difficulty no     Home Accessibility not wheelchair accessible     Equipment Currently Used at Home wound care supplies     Readmission within 30 days? No     Patient currently being followed by outpatient case management? No     Do you currently have service(s) that help you manage your care at home? Yes     Name and Contact number of agency [facility]     Is the pt/caregiver preference to resume services with current agency Yes     Do you take prescription medications? Yes     Do you have prescription coverage? Yes     Coverage [employer] ([employer])     Do you have any problems affording any of your prescribed medications? No     Is the patient taking medications as prescribed? yes     Who is going to help you get home at discharge? Family     How do you get to doctors appointments? car, drives self     Are you on  dialysis? No     Do you take coumadin? No   Xarelto    Discharge Plan A Home Health     Discharge Plan B Home     DME Needed Upon Discharge  none     Discharge Plan discussed with: Patient     Transition of Care Barriers None

## 2024-11-02 NOTE — ASSESSMENT & PLAN NOTE
From volume loss dehydration I think       MRI Brain and CT  CTA brain all negative   And it started only 3 days ago and his UC symptoms Diarrhea started 6 days ago       PLAN    - IVFs

## 2024-11-02 NOTE — CARE UPDATE
This patient is now on my service in the observation unit.  I reviewed the history and physical and interviewed and examined the patient.  I agree with administering IV steroid and mesalamine for what appears to be a flare of ulcerative colitis.  Interestingly, the CRP level is not that high.  Patient states his pain is severe, so I will order IV hydromorphone every 4 hours p.r.n..  I will monitor for over-sedation.  I will monitor his pain level and daily lab work.    RAMSEY Choudhury MD

## 2024-11-02 NOTE — SUBJECTIVE & OBJECTIVE
Past Medical History:   Diagnosis Date    C. difficile colitis     Cavitary pneumonia 11/2023    pos aspergillus serology    Diabetes mellitus 01/2022    Hyperlipidemia 2015    Hypertension 2015    Insomnia 2015    Ulcerative colitis        Past Surgical History:   Procedure Laterality Date    BRONCHOSCOPY WITH FLUOROSCOPY Left 11/20/2023    Procedure: BRONCHOSCOPY, WITH FLUOROSCOPY;  Surgeon: Lisa Villarreal MD;  Location: [facility];  Service: Pulmonary;  Laterality: Left;    BRONCHOSCOPY WITH FLUOROSCOPY N/A 11/30/2023    Procedure: BRONCHOSCOPY, WITH FLUOROSCOPY;  Surgeon: Lisa Villarreal MD;  Location: [facility];  Service: Pulmonary;  Laterality: N/A;    CARDIAC ELECTROPHYSIOLOGY MAPPING AND ABLATION  2017    SVT    CHOLECYSTECTOMY  2011    COLONOSCOPY N/A 5/3/2022    Procedure: COLONOSCOPY;  Surgeon: Shan Jean III, MD;  Location: [facility];  Service: Endoscopy;  Laterality: N/A;    COLONOSCOPY N/A 3/16/2024    Procedure: COLONOSCOPY;  Surgeon: ALEKSANDER Ramos MD;  Location: [facility];  Service: Endoscopy;  Laterality: N/A;    COLONOSCOPY WITH FECAL MICROBIOTA TRANSFER N/A 3/21/2023    Procedure: COLONOSCOPY, WITH FECAL MICROBIOTA TRANSFER;  Surgeon: David Millan MD;  Location: [facility];  Service: Endoscopy;  Laterality: N/A;    ESOPHAGOGASTRODUODENOSCOPY N/A 4/24/2023    Procedure: EGD (ESOPHAGOGASTRODUODENOSCOPY);  Surgeon: Shan Jean III, MD;  Location: [facility];  Service: Endoscopy;  Laterality: N/A;    ESOPHAGOGASTRODUODENOSCOPY N/A 6/5/2024    Procedure: EGD (ESOPHAGOGASTRODUODENOSCOPY);  Surgeon: Odalis Mccabe MD;  Location: [facility];  Service: Endoscopy;  Laterality: N/A;    FLEXIBLE SIGMOIDOSCOPY N/A 6/5/2024    Procedure: SIGMOIDOSCOPY, FLEXIBLE;  Surgeon: Odalis Mccabe MD;  Location: [facility];  Service: Endoscopy;  Laterality: N/A;    FLEXIBLE SIGMOIDOSCOPY N/A 7/16/2024    Procedure: SIGMOIDOSCOPY, FLEXIBLE;  Surgeon: Shan Jean III, MD;   Location: [facility];  Service: Endoscopy;  Laterality: N/A;    FLEXIBLE SIGMOIDOSCOPY N/A 8/13/2024    Procedure: SIGMOIDOSCOPY, FLEXIBLE;  Surgeon: Shan Jean III, MD;  Location: [facility];  Service: Endoscopy;  Laterality: N/A;    GANGLION CYST EXCISION Left 1992    UMBILICAL HERNIA REPAIR  2011    with mesh       Review of patient's allergies indicates:  No Known Allergies    Current Facility-Administered Medications on File Prior to Encounter   Medication    lactated ringers infusion     Current Outpatient Medications on File Prior to Encounter   Medication Sig    budesonide (ENTOCORT EC) 3 mg capsule Take 9 mg by mouth once daily.    BUDESONIDE RECT Place 1 enema rectally 2 (two) times a day.    busPIRone (BUSPAR) 15 MG tablet Take 1 tablet (15 mg total) by mouth 3 (three) times daily.    cholestyramine (QUESTRAN) 4 gram packet Take 1 packet by mouth 3 (three) times daily.    colchicine (COLCRYS) 0.6 mg tablet Take 1 tablet (0.6 mg total) by mouth 2 (two) times daily.    ergocalciferol (VITAMIN D2) 50,000 unit Cap Take 1 capsule (50,000 Units total) by mouth every 7 days.    HYDROcodone-acetaminophen (NORCO) 5-325 mg per tablet Take 1 tablet by mouth every 6 (six) hours as needed for Pain.    hyoscyamine (LEVBID) 0.375 mg Tb12 Take 375 mcg by mouth 2 (two) times daily.    LORazepam (ATIVAN) 0.5 MG tablet Take 1 tablet (0.5 mg total) by mouth every 6 (six) hours as needed for Anxiety.    metoprolol succinate (TOPROL-XL) 50 MG 24 hr tablet Take 1 tablet (50 mg total) by mouth once daily.    pantoprazole (PROTONIX) 40 MG tablet Take 40 mg by mouth 2 (two) times daily.    promethazine (PHENERGAN) 12.5 MG Tab Take 12.5 mg by mouth 4 (four) times daily as needed.    rivaroxaban (XARELTO) 20 mg Tab Take 1 tablet (20 mg total) by mouth daily with dinner or evening meal.    sertraline (ZOLOFT) 100 MG tablet Take 1 tablet (100 mg total) by mouth once daily.    sertraline (ZOLOFT) 50 MG tablet Take 1 tablet (50  mg total) by mouth once daily.    simvastatin (ZOCOR) 20 MG tablet Take 1 tablet (20 mg total) by mouth every evening. (Patient taking differently: Take 20 mg by mouth once daily.)    zolpidem (AMBIEN) 10 mg Tab Take 1 tablet (10 mg total) by mouth nightly as needed (insomnia).     Family History       Problem Relation (Age of Onset)    Asthma Mother          Tobacco Use    Smoking status: Former     Current packs/day: 1.00     Average packs/day: 1 pack/day for 31.8 years (31.8 ttl pk-yrs)     Types: Cigarettes     Start date: 1993    Smokeless tobacco: Never    Tobacco comments:     Pt states he has stopped smoking with Chantix three weeks ago. 12/1/23   Substance and Sexual Activity    Alcohol use: Yes     Comment: ocass    Drug use: Not Currently    Sexual activity: Not Currently     Review of Systems   All other systems reviewed and are negative.    Objective:     Vital Signs (Most Recent):  Temp: 98.2 °F (36.8 °C) (11/02/24 0310)  Pulse: 91 (11/02/24 0310)  Resp: 18 (11/02/24 0423)  BP: 138/84 (11/02/24 0310)  SpO2: 97 % (11/02/24 0310) Vital Signs (24h Range):  Temp:  [98.2 °F (36.8 °C)-98.5 °F (36.9 °C)] 98.2 °F (36.8 °C)  Pulse:  [87-97] 91  Resp:  [16-18] 18  SpO2:  [97 %-100 %] 97 %  BP: (127-165)/(60-84) 138/84     Weight: 133 kg (293 lb 3.4 oz)  Body mass index is 48.79 kg/m².     Physical Exam  Vitals and nursing note reviewed.   Constitutional:       Appearance: Normal appearance.   HENT:      Head: Normocephalic.      Nose: Nose normal.      Mouth/Throat:      Mouth: Mucous membranes are moist.   Eyes:      Extraocular Movements: Extraocular movements intact.      Pupils: Pupils are equal, round, and reactive to light.   Cardiovascular:      Rate and Rhythm: Normal rate.      Pulses: Normal pulses.      Heart sounds: Normal heart sounds.   Pulmonary:      Effort: Pulmonary effort is normal.      Breath sounds: Normal breath sounds.   Abdominal:      General: Abdomen is flat. Bowel sounds are normal.  "     Palpations: Abdomen is soft.      Tenderness: There is abdominal tenderness.   Musculoskeletal:         General: Normal range of motion.      Cervical back: Normal range of motion.   Skin:     General: Skin is warm.      Capillary Refill: Capillary refill takes less than 2 seconds.   Neurological:      General: No focal deficit present.      Mental Status: He is alert and oriented to person, place, and time.   Psychiatric:         Mood and Affect: Mood normal.         Behavior: Behavior normal.              CRANIAL NERVES     CN III, IV, VI   Pupils are equal, round, and reactive to light.       Significant Labs: All pertinent labs within the past 24 hours have been reviewed.  CBC:   Recent Labs   Lab 11/01/24 2204 11/02/24 0456   WBC 17.02* 14.21*   HGB 10.0* 10.1*   HCT 32.0* 32.8*    262     CMP:   Recent Labs   Lab 11/01/24 2204 11/02/24 0456    136   K 4.6 4.6    106   CO2 26 20*   GLU 93 139*   BUN 17 12   CREATININE 1.1 0.9   CALCIUM 9.3 9.1   PROT 7.4 7.3   ALBUMIN 4.0 4.0   BILITOT 0.3 0.4   ALKPHOS 70 82   AST 16 9*   ALT 8* 8*   ANIONGAP 8 10     Cardiac Markers:   Recent Labs   Lab 11/01/24 2204   BNP 49     Coagulation:   Recent Labs   Lab 11/01/24 2204   INR 1.0     Lactic Acid: No results for input(s): "LACTATE" in the last 48 hours.  Lipase:   Recent Labs   Lab 11/01/24 2204   LIPASE 9     Lipid Panel:   Recent Labs   Lab 11/01/24 2204   CHOL 162   HDL 48   LDLCALC 88.4   TRIG 128   CHOLHDL 29.6     Magnesium:   Recent Labs   Lab 11/02/24 0456   MG 1.5*     Prealbumin: No results for input(s): "PREALBUMIN" in the last 48 hours.  Respiratory Culture: No results for input(s): "GSRESP", "RESPIRATORYC" in the last 48 hours.  Troponin:   Recent Labs   Lab 11/01/24 2204   TROPONINIHS 3.8     TSH:   Recent Labs   Lab 11/01/24 2204   TSH 0.515     Urine Studies:   Recent Labs   Lab 11/01/24 2023   COLORU Yellow   APPEARANCEUA Clear   PHUR 6.0   SPECGRAV 1.025   PROTEINUA " Negative   GLUCUA Negative   KETONESU Negative   BILIRUBINUA Negative   OCCULTUA 3+*   NITRITE Negative   UROBILINOGEN Negative   LEUKOCYTESUR Negative   RBCUA >100*   WBCUA 2       Significant Imaging: I have reviewed all pertinent imaging results/findings within the past 24 hours.  I have reviewed and interpreted all pertinent imaging results/findings within the past 24 hours.

## 2024-11-03 PROBLEM — E83.42 HYPOMAGNESEMIA: Status: ACTIVE | Noted: 2024-11-03

## 2024-11-03 PROBLEM — D63.8 CHRONIC DISEASE ANEMIA: Status: ACTIVE | Noted: 2023-01-11

## 2024-11-03 PROBLEM — E83.42 HYPOMAGNESEMIA: Status: RESOLVED | Noted: 2024-07-15 | Resolved: 2024-11-03

## 2024-11-03 LAB
ALBUMIN SERPL BCP-MCNC: 4.2 G/DL (ref 3.5–5.2)
ALP SERPL-CCNC: 75 U/L (ref 55–135)
ALT SERPL W/O P-5'-P-CCNC: 8 U/L (ref 10–44)
ANION GAP SERPL CALC-SCNC: 8 MMOL/L (ref 8–16)
AST SERPL-CCNC: 10 U/L (ref 10–40)
BASOPHILS # BLD AUTO: 0.01 K/UL (ref 0–0.2)
BASOPHILS NFR BLD: 0.1 % (ref 0–1.9)
BILIRUB SERPL-MCNC: 0.5 MG/DL (ref 0.1–1)
BUN SERPL-MCNC: 13 MG/DL (ref 6–20)
CALCIUM SERPL-MCNC: 9.7 MG/DL (ref 8.7–10.5)
CHLORIDE SERPL-SCNC: 99 MMOL/L (ref 95–110)
CO2 SERPL-SCNC: 28 MMOL/L (ref 23–29)
CREAT SERPL-MCNC: 0.9 MG/DL (ref 0.5–1.4)
DIFFERENTIAL METHOD BLD: ABNORMAL
EOSINOPHIL # BLD AUTO: 0 K/UL (ref 0–0.5)
EOSINOPHIL NFR BLD: 0 % (ref 0–8)
ERYTHROCYTE [DISTWIDTH] IN BLOOD BY AUTOMATED COUNT: 14.6 % (ref 11.5–14.5)
EST. GFR  (NO RACE VARIABLE): >60 ML/MIN/1.73 M^2
GLUCOSE SERPL-MCNC: 132 MG/DL (ref 70–110)
HCT VFR BLD AUTO: 34.4 % (ref 40–54)
HGB BLD-MCNC: 10.4 G/DL (ref 14–18)
IMM GRANULOCYTES # BLD AUTO: 0.06 K/UL (ref 0–0.04)
IMM GRANULOCYTES NFR BLD AUTO: 0.5 % (ref 0–0.5)
LYMPHOCYTES # BLD AUTO: 0.9 K/UL (ref 1–4.8)
LYMPHOCYTES NFR BLD: 6.9 % (ref 18–48)
MAGNESIUM SERPL-MCNC: 1.8 MG/DL (ref 1.6–2.6)
MCH RBC QN AUTO: 28 PG (ref 27–31)
MCHC RBC AUTO-ENTMCNC: 30.2 G/DL (ref 32–36)
MCV RBC AUTO: 93 FL (ref 82–98)
MONOCYTES # BLD AUTO: 0.6 K/UL (ref 0.3–1)
MONOCYTES NFR BLD: 5 % (ref 4–15)
NEUTROPHILS # BLD AUTO: 11.1 K/UL (ref 1.8–7.7)
NEUTROPHILS NFR BLD: 87.5 % (ref 38–73)
NRBC BLD-RTO: 0 /100 WBC
PLATELET # BLD AUTO: 295 K/UL (ref 150–450)
PMV BLD AUTO: 10.8 FL (ref 9.2–12.9)
POTASSIUM SERPL-SCNC: 4.8 MMOL/L (ref 3.5–5.1)
PROT SERPL-MCNC: 7.9 G/DL (ref 6–8.4)
RBC # BLD AUTO: 3.72 M/UL (ref 4.6–6.2)
SODIUM SERPL-SCNC: 135 MMOL/L (ref 136–145)
WBC # BLD AUTO: 12.7 K/UL (ref 3.9–12.7)

## 2024-11-03 PROCEDURE — 85025 COMPLETE CBC W/AUTO DIFF WBC: CPT | Performed by: INTERNAL MEDICINE

## 2024-11-03 PROCEDURE — 25000003 PHARM REV CODE 250: Performed by: HOSPITALIST

## 2024-11-03 PROCEDURE — 36415 COLL VENOUS BLD VENIPUNCTURE: CPT | Performed by: INTERNAL MEDICINE

## 2024-11-03 PROCEDURE — 63600175 PHARM REV CODE 636 W HCPCS: Performed by: HOSPITALIST

## 2024-11-03 PROCEDURE — 63600175 PHARM REV CODE 636 W HCPCS: Performed by: INTERNAL MEDICINE

## 2024-11-03 PROCEDURE — 25000003 PHARM REV CODE 250: Performed by: INTERNAL MEDICINE

## 2024-11-03 PROCEDURE — 96376 TX/PRO/DX INJ SAME DRUG ADON: CPT

## 2024-11-03 PROCEDURE — 80053 COMPREHEN METABOLIC PANEL: CPT | Performed by: INTERNAL MEDICINE

## 2024-11-03 PROCEDURE — 83735 ASSAY OF MAGNESIUM: CPT | Performed by: INTERNAL MEDICINE

## 2024-11-03 PROCEDURE — G0378 HOSPITAL OBSERVATION PER HR: HCPCS

## 2024-11-03 RX ORDER — IBUPROFEN 200 MG
16 TABLET ORAL
Status: DISCONTINUED | OUTPATIENT
Start: 2024-11-03 | End: 2024-11-04 | Stop reason: HOSPADM

## 2024-11-03 RX ORDER — GLUCAGON 1 MG
1 KIT INJECTION
Status: DISCONTINUED | OUTPATIENT
Start: 2024-11-03 | End: 2024-11-04 | Stop reason: HOSPADM

## 2024-11-03 RX ORDER — HYDROMORPHONE HYDROCHLORIDE 1 MG/ML
0.5 INJECTION, SOLUTION INTRAMUSCULAR; INTRAVENOUS; SUBCUTANEOUS EVERY 6 HOURS PRN
Status: DISCONTINUED | OUTPATIENT
Start: 2024-11-03 | End: 2024-11-04

## 2024-11-03 RX ORDER — INSULIN ASPART 100 [IU]/ML
0-5 INJECTION, SOLUTION INTRAVENOUS; SUBCUTANEOUS
Status: DISCONTINUED | OUTPATIENT
Start: 2024-11-04 | End: 2024-11-04 | Stop reason: HOSPADM

## 2024-11-03 RX ORDER — IBUPROFEN 200 MG
24 TABLET ORAL
Status: DISCONTINUED | OUTPATIENT
Start: 2024-11-03 | End: 2024-11-04 | Stop reason: HOSPADM

## 2024-11-03 RX ADMIN — MESALAMINE 1000 MG: 250 CAPSULE ORAL at 09:11

## 2024-11-03 RX ADMIN — METOPROLOL SUCCINATE 50 MG: 50 TABLET, FILM COATED, EXTENDED RELEASE ORAL at 08:11

## 2024-11-03 RX ADMIN — HYDROMORPHONE HYDROCHLORIDE 1 MG: 1 INJECTION, SOLUTION INTRAMUSCULAR; INTRAVENOUS; SUBCUTANEOUS at 07:11

## 2024-11-03 RX ADMIN — SERTRALINE HYDROCHLORIDE 100 MG: 50 TABLET ORAL at 08:11

## 2024-11-03 RX ADMIN — ZOLPIDEM TARTRATE 10 MG: 5 TABLET, COATED ORAL at 08:11

## 2024-11-03 RX ADMIN — Medication 6 MG: at 08:11

## 2024-11-03 RX ADMIN — HYDROMORPHONE HYDROCHLORIDE 0.5 MG: 0.5 INJECTION, SOLUTION INTRAMUSCULAR; INTRAVENOUS; SUBCUTANEOUS at 03:11

## 2024-11-03 RX ADMIN — BUSPIRONE HYDROCHLORIDE 15 MG: 5 TABLET ORAL at 08:11

## 2024-11-03 RX ADMIN — METHYLPREDNISOLONE SODIUM SUCCINATE 40 MG: 40 INJECTION, POWDER, FOR SOLUTION INTRAMUSCULAR; INTRAVENOUS at 01:11

## 2024-11-03 RX ADMIN — SENNOSIDES AND DOCUSATE SODIUM 1 TABLET: 8.6; 5 TABLET ORAL at 08:11

## 2024-11-03 RX ADMIN — ALUMINUM HYDROXIDE, MAGNESIUM HYDROXIDE, AND SIMETHICONE 30 ML: 200; 200; 20 SUSPENSION ORAL at 11:11

## 2024-11-03 RX ADMIN — HYDROMORPHONE HYDROCHLORIDE 0.5 MG: 0.5 INJECTION, SOLUTION INTRAMUSCULAR; INTRAVENOUS; SUBCUTANEOUS at 08:11

## 2024-11-03 RX ADMIN — HYDROCODONE BITARTRATE AND ACETAMINOPHEN 2 TABLET: 5; 325 TABLET ORAL at 12:11

## 2024-11-03 RX ADMIN — METHYLPREDNISOLONE SODIUM SUCCINATE 40 MG: 40 INJECTION, POWDER, FOR SOLUTION INTRAMUSCULAR; INTRAVENOUS at 08:11

## 2024-11-03 RX ADMIN — METHYLPREDNISOLONE SODIUM SUCCINATE 20 MG: 40 INJECTION, POWDER, FOR SOLUTION INTRAMUSCULAR; INTRAVENOUS at 08:11

## 2024-11-03 RX ADMIN — LORAZEPAM 0.5 MG: 0.5 TABLET ORAL at 01:11

## 2024-11-03 RX ADMIN — HYDROMORPHONE HYDROCHLORIDE 1 MG: 1 INJECTION, SOLUTION INTRAMUSCULAR; INTRAVENOUS; SUBCUTANEOUS at 01:11

## 2024-11-03 RX ADMIN — MESALAMINE 1000 MG: 250 CAPSULE ORAL at 04:11

## 2024-11-03 RX ADMIN — MESALAMINE 1000 MG: 250 CAPSULE ORAL at 12:11

## 2024-11-03 RX ADMIN — BUSPIRONE HYDROCHLORIDE 15 MG: 5 TABLET ORAL at 02:11

## 2024-11-03 RX ADMIN — SERTRALINE HYDROCHLORIDE 50 MG: 50 TABLET ORAL at 08:11

## 2024-11-03 RX ADMIN — RIVAROXABAN 20 MG: 10 TABLET, FILM COATED ORAL at 04:11

## 2024-11-03 RX ADMIN — MESALAMINE 1000 MG: 250 CAPSULE ORAL at 08:11

## 2024-11-04 VITALS
DIASTOLIC BLOOD PRESSURE: 80 MMHG | OXYGEN SATURATION: 98 % | HEART RATE: 68 BPM | TEMPERATURE: 99 F | SYSTOLIC BLOOD PRESSURE: 159 MMHG | BODY MASS INDEX: 48.85 KG/M2 | HEIGHT: 65 IN | WEIGHT: 293.19 LBS | RESPIRATION RATE: 16 BRPM

## 2024-11-04 PROBLEM — E83.42 HYPOMAGNESEMIA: Status: RESOLVED | Noted: 2024-11-03 | Resolved: 2024-11-04

## 2024-11-04 PROBLEM — E86.0 DEHYDRATION: Status: RESOLVED | Noted: 2023-04-20 | Resolved: 2024-11-04

## 2024-11-04 PROBLEM — Z86.718 HISTORY OF DVT (DEEP VEIN THROMBOSIS): Status: ACTIVE | Noted: 2024-11-04

## 2024-11-04 LAB
ALBUMIN SERPL BCP-MCNC: 3.7 G/DL (ref 3.5–5.2)
ALP SERPL-CCNC: 61 U/L (ref 55–135)
ALT SERPL W/O P-5'-P-CCNC: 9 U/L (ref 10–44)
ANION GAP SERPL CALC-SCNC: 5 MMOL/L (ref 8–16)
AST SERPL-CCNC: 11 U/L (ref 10–40)
BASOPHILS # BLD AUTO: 0.01 K/UL (ref 0–0.2)
BASOPHILS NFR BLD: 0.1 % (ref 0–1.9)
BILIRUB SERPL-MCNC: 0.4 MG/DL (ref 0.1–1)
BUN SERPL-MCNC: 17 MG/DL (ref 6–20)
CALCIUM SERPL-MCNC: 9.3 MG/DL (ref 8.7–10.5)
CHLORIDE SERPL-SCNC: 99 MMOL/L (ref 95–110)
CO2 SERPL-SCNC: 28 MMOL/L (ref 23–29)
CREAT SERPL-MCNC: 1.1 MG/DL (ref 0.5–1.4)
DIFFERENTIAL METHOD BLD: ABNORMAL
EOSINOPHIL # BLD AUTO: 0 K/UL (ref 0–0.5)
EOSINOPHIL NFR BLD: 0 % (ref 0–8)
ERYTHROCYTE [DISTWIDTH] IN BLOOD BY AUTOMATED COUNT: 14.5 % (ref 11.5–14.5)
EST. GFR  (NO RACE VARIABLE): >60 ML/MIN/1.73 M^2
GLUCOSE SERPL-MCNC: 137 MG/DL (ref 70–110)
HCT VFR BLD AUTO: 30.7 % (ref 40–54)
HGB BLD-MCNC: 9.7 G/DL (ref 14–18)
IMM GRANULOCYTES # BLD AUTO: 0.09 K/UL (ref 0–0.04)
IMM GRANULOCYTES NFR BLD AUTO: 0.6 % (ref 0–0.5)
LYMPHOCYTES # BLD AUTO: 1 K/UL (ref 1–4.8)
LYMPHOCYTES NFR BLD: 6.6 % (ref 18–48)
MAGNESIUM SERPL-MCNC: 1.7 MG/DL (ref 1.6–2.6)
MCH RBC QN AUTO: 28.7 PG (ref 27–31)
MCHC RBC AUTO-ENTMCNC: 31.6 G/DL (ref 32–36)
MCV RBC AUTO: 91 FL (ref 82–98)
MONOCYTES # BLD AUTO: 0.7 K/UL (ref 0.3–1)
MONOCYTES NFR BLD: 4.8 % (ref 4–15)
NEUTROPHILS # BLD AUTO: 12.7 K/UL (ref 1.8–7.7)
NEUTROPHILS NFR BLD: 87.9 % (ref 38–73)
NRBC BLD-RTO: 0 /100 WBC
OHS QRS DURATION: 70 MS
OHS QTC CALCULATION: 437 MS
PLATELET # BLD AUTO: 275 K/UL (ref 150–450)
PMV BLD AUTO: 10.6 FL (ref 9.2–12.9)
POCT GLUCOSE: 119 MG/DL (ref 70–110)
POCT GLUCOSE: 131 MG/DL (ref 70–110)
POTASSIUM SERPL-SCNC: 4.7 MMOL/L (ref 3.5–5.1)
PROT SERPL-MCNC: 6.5 G/DL (ref 6–8.4)
RBC # BLD AUTO: 3.38 M/UL (ref 4.6–6.2)
SODIUM SERPL-SCNC: 132 MMOL/L (ref 136–145)
WBC # BLD AUTO: 14.42 K/UL (ref 3.9–12.7)

## 2024-11-04 PROCEDURE — 21400001 HC TELEMETRY ROOM

## 2024-11-04 PROCEDURE — 83735 ASSAY OF MAGNESIUM: CPT | Performed by: INTERNAL MEDICINE

## 2024-11-04 PROCEDURE — 25000003 PHARM REV CODE 250: Performed by: HOSPITALIST

## 2024-11-04 PROCEDURE — 80053 COMPREHEN METABOLIC PANEL: CPT | Performed by: INTERNAL MEDICINE

## 2024-11-04 PROCEDURE — 96376 TX/PRO/DX INJ SAME DRUG ADON: CPT

## 2024-11-04 PROCEDURE — 63600175 PHARM REV CODE 636 W HCPCS: Performed by: HOSPITALIST

## 2024-11-04 PROCEDURE — 85025 COMPLETE CBC W/AUTO DIFF WBC: CPT | Performed by: INTERNAL MEDICINE

## 2024-11-04 PROCEDURE — 36415 COLL VENOUS BLD VENIPUNCTURE: CPT | Performed by: INTERNAL MEDICINE

## 2024-11-04 PROCEDURE — 25000003 PHARM REV CODE 250: Performed by: INTERNAL MEDICINE

## 2024-11-04 RX ORDER — PREDNISONE 10 MG/1
TABLET ORAL
Qty: 70 TABLET | Refills: 0 | Status: SHIPPED | OUTPATIENT
Start: 2024-11-04 | End: 2024-12-02

## 2024-11-04 RX ADMIN — METOPROLOL SUCCINATE 50 MG: 50 TABLET, FILM COATED, EXTENDED RELEASE ORAL at 08:11

## 2024-11-04 RX ADMIN — ACETAMINOPHEN 650 MG: 325 TABLET ORAL at 01:11

## 2024-11-04 RX ADMIN — BUSPIRONE HYDROCHLORIDE 15 MG: 5 TABLET ORAL at 08:11

## 2024-11-04 RX ADMIN — HYDROMORPHONE HYDROCHLORIDE 0.5 MG: 0.5 INJECTION, SOLUTION INTRAMUSCULAR; INTRAVENOUS; SUBCUTANEOUS at 04:11

## 2024-11-04 RX ADMIN — MESALAMINE 1000 MG: 250 CAPSULE ORAL at 08:11

## 2024-11-04 RX ADMIN — METHYLPREDNISOLONE SODIUM SUCCINATE 20 MG: 40 INJECTION, POWDER, FOR SOLUTION INTRAMUSCULAR; INTRAVENOUS at 08:11

## 2024-11-04 RX ADMIN — LORAZEPAM 0.5 MG: 0.5 TABLET ORAL at 01:11

## 2024-11-04 RX ADMIN — HYDROCODONE BITARTRATE AND ACETAMINOPHEN 2 TABLET: 5; 325 TABLET ORAL at 08:11

## 2024-11-04 RX ADMIN — SERTRALINE HYDROCHLORIDE 50 MG: 50 TABLET ORAL at 08:11

## 2024-11-04 RX ADMIN — SERTRALINE HYDROCHLORIDE 100 MG: 50 TABLET ORAL at 08:11

## 2024-11-04 NOTE — NURSING
After Visit Summary   10/29/2018    Nubia Villa    MRN: [id number]           Patient Information     Date Of Birth          2010        Visit Information        Provider Department      10/29/2018 6:50 PM Sita Ascencio MD [facility] URGENT CARE        Today's Diagnoses     Fever in child    -  1    Abdominal pain, epigastric        Gastroenteritis        Strep throat          Care Instructions      Pharyngitis: Strep (Confirmed)    You have had a positive test for strep throat. Strep throat is a contagious illness. It is spread by coughing, kissing or by touching others after touching your mouth or nose. Symptoms include throat pain that is worse with swallowing, aching all over, headache, and fever. It is treated with antibiotic medicine. This should help you start to feel better in 1 to 2 days.  Home care    Rest at home. Drink plenty of fluids to you won't get dehydrated.    No work or school for the first 2 days of taking the antibiotics. After this time, you will not be contagious. You can then return to school or work if you are feeling better.     Take antibiotic medicine for the full 10 days, even if you feel better. This is very important to ensure the infection is treated. It is also important to prevent medicine-resistant germs from developing. If you were given an antibiotic shot, you don't need any more antibiotics.    You may use acetaminophen or ibuprofen to control pain or fever, unless another medicine was prescribed for this. Talk with your healthcare provider before taking these medicines if you have chronic liver or kidney disease. Also talk with your healthcare provider if you have had a stomach ulcer or GI bleeding.    Throat lozenges or sprays help reduce pain. Gargling with warm saltwater will also reduce throat pain. Dissolve 1/2 teaspoon of salt in 1 glass of warm water. This may be useful just before meals.     Soft foods are OK. Don't eat salty  Patient D/C via MD order. Telemetry and PIV removed. Discharge instructions reviewed with patient. Patient ambulated to vehicle via declined W/C.    or spicy foods.  Follow-up care  Follow up with your healthcare provider or our staff if you don't get better over the next week.  When to seek medical advice  Call your healthcare provider right away if any of these occur:    Fever of 100.4 F (38 C) or higher, or as directed by your healthcare provider    New or worsening ear pain, sinus pain, or headache    Painful lumps in the back of neck    Stiff neck    Lymph nodes getting larger or becoming soft in the middle    You can't swallow liquids or you can't open your mouth wide because of throat pain    Signs of dehydration. These include very dark urine or no urine, sunken eyes, and dizziness.    Trouble breathing or noisy breathing    Muffled voice    Rash  Prevention  Here are steps you can take to help prevent an infection:    Keep good hand washing habits.    Don t have close contact with people who have sore throats, colds, or other upper respiratory infections.    Don t smoke, and stay away from secondhand smoke.  Date Last Reviewed: 11/1/2017 2000-2017 [employer]. [address] [place] [address]. All rights reserved. This information is not intended as a substitute for professional medical care. Always follow your healthcare professional's instructions.        Viral Gastroenteritis (Child)    Most diarrhea and vomiting in children is caused by a virus. This is called viral gastroenteritis. Many people call it the  stomach flu,  but it has nothing to do with influenza. This virus affects the stomach and intestinal tract. It usually lasts 2 to 7 days. Diarrhea means passing loose watery stools 3 or more times a day.  Your child may also have these symptoms:    Abdominal pain and cramping    Nausea    Vomiting    Loss of bowel control    Fever and chills    Bloody stools  The main danger from this illness is dehydration. This is the loss of too much water and minerals from the body. When this occurs, body fluids must be replaced.  This can be done with oral rehydration solution. Oral rehydration solution is available at drugstores and most grocery stores.  Antibiotics are not effective for this illness.  Home care  Follow all instructions given by your child s healthcare provider.  If giving medicines to your child:    Don t give over-the-counter diarrhea medicines unless your child s healthcare provider tells you to.    You can use acetaminophen or ibuprofen to control pain and fever. Or, you can use other medicine as prescribed.    Don t give aspirin to anyone under 18 years of age who has a fever. This may cause liver damage and a life-threatening condition called Reye syndrome.  To prevent the spread of illness:    Remember that washing with soap and water and using alcohol-based  is the best way to prevent the spread of infection.    Wash your hands before and after caring for your sick child.    Clean the toilet after each use.    Dispose of soiled diapers in a sealed container.    Keep your child out of day care until he or she is cleared by the healthcare provider.    Wash your hands before and after preparing food.    Wash your hands and utensils after using cutting boards, countertops and knives that have been in contact with raw foods.    Keep uncooked meats away from cooked and ready-to-eat foods.    Keep in mind that people with diarrhea or vomiting should not prepare food for others.  Giving liquids and food  The main goal while treating vomiting or diarrhea is to prevent dehydration. This is done by giving small amounts of liquids often.    Keep in mind that liquids are more important than food right now. Give small amounts of liquids at a time, especially if your child is having stomach cramps or vomiting.    For diarrhea: If you are giving milk to your child and the diarrhea is not going away, stop the milk. In some cases, milk can make diarrhea worse. If that happens, use oral rehydration solution instead. Do not  give apple juice, soda, or other sweetened drinks. Drinks with sugar can make diarrhea worse.    For vomiting: Begin with oral rehydration solution at room temperature. Give 1 teaspoon (5 ml) every 1 to 2 minutes. Even if your child vomits, continue to give the solution. Much of the liquid will be absorbed, despite the vomiting. After 2 hours with no vomiting, begin with small amounts of milk or formula and other fluids. Increase the amount as tolerated. Do not give your child plain water, milk, formula, or other liquids until vomiting stops. As vomiting decreases, try giving larger amounts of oral rehydration solution. Space this out with more time in between. Continue this until your child is making urine and is no longer thirsty (has no interest in drinking). After 4 hours with no vomiting, restart solid foods. After 24 hours with no vomiting, resume a normal diet.    You can resume your child's normal diet over time as he or she feels better. Don t force your child to eat, especially if he or she is having stomach pain or cramping. Don t feed your child large amounts at a time, even if he or she is hungry. This can make your child feel worse. You can give your child more food over time if he or she can tolerate it. Foods you can give include cereal, mashed potatoes, applesauce, mashed bananas, crackers, dry toast, rice, oatmeal, bread, noodles, pretzels, soups with rice or noodles, and cooked vegetables.    If the symptoms come back, go back to a simple diet or clear liquids.  Follow-up care  Follow up with your child s healthcare provider, or as advised. If a stool sample was taken or cultures were done, call the healthcare provider for the results as instructed.  Call 911  Call 911 if your child has any of these symptoms:    Trouble breathing    Confusion    Extreme drowsiness or trouble walking    Loss of consciousness    Rapid heart rate    Chest pain    Stiff neck    Seizure  When to seek medical  advice  Call your child s healthcare provider right away if any of these occur:    Abdominal pain that gets worse    Constant lower right abdominal pain    Repeated vomiting after the first 2 hours on liquids    Occasional vomiting for more than 24 hours    Continued severe diarrhea for more than 24 hours    Blood in vomit or stool    Reduced oral intake    Dark urine or no urine for 6 to 8 hours in older children, 4 to 6 hours for babies and young children    Fussiness or crying that cannot be soothed    Unusual drowsiness    New rash    More than 8 diarrhea stools within 8 hours    Diarrhea lasts more than 10 days    A child 2 years or older has a fever for more than 3 days    A child of any age has repeated fevers above 104 F (40 C)  Date Last Reviewed: 12/13/2015 2000-2017 [employer]. [address] [place] [address]. All rights reserved. This information is not intended as a substitute for professional medical care. Always follow your healthcare professional's instructions.                Follow-ups after your visit        Your next 10 appointments already scheduled     Nov 19, 2018  1:30 PM CST   SHORT with [facility] Causey MD   [facility]    [address] [place] [id number] [phone]              Who to contact     If you have questions or need follow up information about today's clinic visit or your schedule please contact [facility] URGENT CARE directly at [phone].  Normal or non-critical lab and imaging results will be communicated to you by MyChart, letter or phone within 4 business days after the clinic has received the results. If you do not hear from us within 7 days, please contact the clinic through MyChart or phone. If you have a critical or abnormal lab result, we will notify you by phone as soon as possible.  Submit refill requests through [employer] or call your pharmacy and they will  forward the refill request to us. Please allow 3 business days for your refill to be completed.          Additional Information About Your Visit        Me-Moverharfypio Information     RollCall ([email or web address]) lets you send messages to your doctor, view your test results, renew your prescriptions, schedule appointments and more. To sign up, go to [email or web address] includes the [facility] [employer]/RollCall [email or web address]), contact your [facility] clinic or call [phone] during business hours.            Care EveryWhere ID     This is your Care EveryWhere ID. This could be used by other organizations to access your [facility] medical records  [id number]        Your Vitals Were     Pulse Temperature Pulse Oximetry             139 102.5  F (39.2  C) (Oral) 97%          Blood Pressure from Last 3 Encounters:   10/29/18 103/68   05/11/18 114/74   05/07/18 117/62    Weight from Last 3 Encounters:   10/29/18 62 lb (28.1 kg) (45 %)*   05/11/18 58 lb 10.3 oz (26.6 kg) (43 %)*   05/07/18 60 lb 6.5 oz (27.4 kg) (51 %)*     * Growth percentiles are based on [facility] 2-20 Years data.              We Performed the Following     Influenza A/B antigen     Rapid strep screen          Today's Medication Changes          These changes are accurate as of 10/29/18  8:21 PM.  If you have any questions, ask your nurse or doctor.               Start taking these medicines.        Dose/Directions    azithromycin 200 MG/5ML suspension   Commonly known as:  ZITHROMAX   Used for:  Strep throat        Dose:  12 mg/kg/day   Take 7.5 mLs (300 mg) by mouth daily for 5 days   Quantity:  37.5 mL   Refills:  0       ondansetron 4 MG ODT tab   Commonly known as:  ZOFRAN ODT   Used for:  Gastroenteritis        Dose:  4 mg   Take 1 tablet (4 mg) by mouth every 8 hours as needed for nausea   Quantity:  20 tablet   Refills:  1            Where to get your medicines      [facility] [address] [facility] [address] [facility] [address] [address] [facility], [facility] [address]      Phone:  [phone]     azithromycin 200 MG/5ML suspension    ondansetron 4 MG ODT tab                Primary Care Provider Office Phone # Fax #    Adrianne Cauesy [phone] [address] [place] [address]        Equal Access to Services     [facility]: Hadii aad ku hadasho Soomaali, waaxda luqadaha, qaybta kaalmada adeegyada, waxay idiin hayaan adeeg mika lapetrosn ah. So [facility] [phone].    ATENCIÓN: Si habla español, tiene a terrell disposición servicios gratuitos de asistencia lingüística. [facility] [phone].    We comply with applicable federal civil rights laws and [place] laws. We do not discriminate on the basis of race, color, national origin, age, disability, sex, sexual orientation, or gender identity.            Thank you!     Thank you for choosing [facility] URGENT CARE  for your care. Our goal is always to provide you with excellent care. Hearing back from our patients is one way we can continue to improve our services. Please take a few minutes to complete the written survey that you may receive in the mail after your visit with us. Thank you!             Your Updated Medication List - Protect others around you: Learn how to safely use, store and throw away your medicines at www.disposemymeds.org.          This list is accurate as of 10/29/18  8:21 PM.  Always use your most recent med list.                   Brand Name Dispense Instructions for use Diagnosis    * albuterol (2.5 MG/3ML) 0.083% neb solution     30 vial    Take 1 vial (2.5 mg) by nebulization every 6 hours as needed for shortness of breath / dyspnea or wheezing    Wheezing       * albuterol 108 (90 Base) MCG/ACT inhaler    PROAIR HFA/PROVENTIL HFA/VENTOLIN HFA    1 Inhaler    Inhale 2 puffs into the lungs every 6 hours as needed for shortness of breath / dyspnea or wheezing    Cough       azithromycin 200 MG/5ML suspension    ZITHROMAX    37.5 mL    Take 7.5 mLs (300 mg) by mouth  daily for 5 days    Strep throat       hydrocortisone 1 % ointment     30 g    Apply sparingly to affected area three times daily for 14 days.    Rash and nonspecific skin eruption       omeprazole 20 MG CR capsule    priLOSEC    30 capsule    Take 1 capsule (20 mg) by mouth daily 15-30 minutes before breakfast    Abdominal pain, generalized, Nausea and vomiting, vomiting of unspecified type       ondansetron 4 MG ODT tab    ZOFRAN ODT    20 tablet    Take 1 tablet (4 mg) by mouth every 8 hours as needed for nausea    Gastroenteritis       * Notice:  This list has 2 medication(s) that are the same as other medications prescribed for you. Read the directions carefully, and ask your doctor or other care provider to review them with you.

## 2024-11-04 NOTE — PLAN OF CARE
Spoke with pt at bedside regarding anticipated DC for today, he will call Dr Jean's office to schedule follow up appt, he will be driving himself home and his pharmacy was updated to [facility] in [place] by [facility] as requested.   Resumption of care  orders requested.    11/04/24 1022   Discharge Reassessment   Assessment Type Discharge Planning Reassessment   Post-Acute Status   Hospital Resources/Appts/Education Provided Patient refused appointment set-up

## 2024-11-04 NOTE — PROGRESS NOTES
[facility] Medicine  Progress Note    Patient Name: Jacoby Jean-Baptiste  MRN: [id number]  Patient Class: OP- Observation   Admission Date: 11/1/2024  Length of Stay: 0 days  Attending Physician: Ash Choudhury MD  Primary Care Provider: Hunter Aguilar III, MD        Subjective:     Principal Problem:Exacerbation of ulcerative colitis without complication        HPI:  49-year-old history of diabetes hypertension hyperlipidemia ulcerative colitis presents for evaluation of dizziness described as feeling off balance and lightheadedness that has been ongoing since at least Wednesday.  He reports the sensation is constant.  He reports he has fallen twice due to being off balance.  He denies striking his head or LOC.  He denies any other injuries.  He denies headache chest pain.  He reports shortness of breath which is stable for him.  He reports some crampy abdominal pain over the last week or so, this feels like ulcerative colitis flare, this is a recurring problem for him.  He denies nausea or vomiting he denies blood in his stool.  He denies change in urination.  He takes Xarelto for an upper extremity DVT.  He denies having a stroke previously.     Non smoker  Non drinker  Lives alone at home  Active       He says this is how his UC flares start.  They usually have blood Bms     but so far NO  Blood this time yet .     Its been about 6 days of the pain and diarrhea .     He had 7 episodes of diarrhea the day prior     And it can be a lot more than that he said , per day       He has not been eating as  much but was drinking fluids normally he says         In our ER      He got 1 liter IVF Bolus     And some Solumedrol 125 mg IV       Regarding his dizziness   they did CT head, CTA head neck and MRI Brain   and all was NEGATIVE           Plan is to admit him for UC Flare and his dizziness     Overview/Hospital Course:  No notes on file    Interval History:  continues to have significant  abdominal pain.  No blood per rectum.  No vomiting.  He's tolerating the clear liquids.  Wants to try solid foods.    Review of Systems   Constitutional:  Negative for fatigue.   Respiratory:  Negative for shortness of breath.      Objective:     Vital Signs (Most Recent):  Temp: 97.8 °F (36.6 °C) (11/03/24 1945)  Pulse: 79 (11/03/24 1945)  Resp: 16 (11/03/24 1945)  BP: 130/67 (11/03/24 1945)  SpO2: 95 % (11/03/24 1945) Vital Signs (24h Range):  Temp:  [97.7 °F (36.5 °C)-98.4 °F (36.9 °C)] 97.8 °F (36.6 °C)  Pulse:  [66-85] 79  Resp:  [15-18] 16  SpO2:  [94 %-97 %] 95 %  BP: (130-153)/(63-73) 130/67     Weight: 133 kg (293 lb 3.4 oz)  Body mass index is 48.79 kg/m².    Intake/Output Summary (Last 24 hours) at 11/3/2024 2031  Last data filed at 11/3/2024 1653  Gross per 24 hour   Intake 1678 ml   Output --   Net 1678 ml         Physical Exam  Vitals reviewed.   Constitutional:       General: He is not in acute distress.     Appearance: He is not diaphoretic.   HENT:      Mouth/Throat:      Mouth: Mucous membranes are moist.   Eyes:      General: No scleral icterus.        Right eye: No discharge.         Left eye: No discharge.   Neck:      Vascular: No JVD.   Cardiovascular:      Rate and Rhythm: Normal rate and regular rhythm.   Pulmonary:      Effort: Pulmonary effort is normal.      Breath sounds: Normal breath sounds.   Abdominal:      General: Bowel sounds are normal. There is no distension.      Palpations: Abdomen is soft.      Tenderness: There is abdominal tenderness.   Skin:     General: Skin is warm.      Findings: No rash.   Neurological:      Mental Status: He is alert.             Significant Labs: All pertinent labs within the past 24 hours have been reviewed.    Significant Imaging: I have reviewed all pertinent imaging results/findings within the past 24 hours.    Assessment/Plan:      * Exacerbation of ulcerative colitis without complication  Continue IV systemic steroid.  Reduce dose to 20 mg  every 12 hours.  Continue mesalamine.  Advance diet to bland.  Monitor for tolerance.    Hypomagnesemia  Patient has Abnormal Magnesium: hypomagnesemia. Will continue to monitor electrolytes closely. Will replace the affected electrolytes and repeat labs to be done after interventions completed. The patient's magnesium results have been reviewed and are listed below.  Recent Labs   Lab 11/03/24  0903   MG 1.8        Dehydration  Appears to be hydrated now.  No need to continue the IV crystalloids.  Will encourage patient to drink plenty of fluids.    Chronic disease anemia  Anemia is likely due to chronic disease due to ulcerative colitis . Most recent hemoglobin and hematocrit are listed below.  Recent Labs     11/01/24  2204 11/02/24  0456 11/03/24  0903   HGB 10.0* 10.1* 10.4*   HCT 32.0* 32.8* 34.4*     Plan  - Monitor serial CBC: Daily  - Transfuse PRBC if patient becomes hemodynamically unstable, symptomatic or H/H drops below 7/21.  - Patient has not received any PRBC transfusions to date  - Patient's anemia is currently stable    Type 2 diabetes mellitus without complication, without long-term current use of insulin  Patient's FSGs will be monitored  Last A1c reviewed-   Lab Results   Component Value Date    HGBA1C 5.8 11/02/2024       Current correctional scale  Low  Maintain anti-hyperglycemic dose as follows-   Antihyperglycemics (From admission, onward)      Start     Stop Route Frequency Ordered    11/04/24 0000  insulin aspart U-100 pen 0-5 Units         -- SubQ Before meals & nightly PRN 11/03/24 2037          Hold Oral hypoglycemics while patient is in the hospital.    Morbid obesity  Body mass index is 48.79 kg/m². Morbid obesity complicates all aspects of disease management from diagnostic modalities to treatment. Weight loss encouraged and health benefits explained to patient.           VTE Risk Mitigation (From admission, onward)           Ordered     rivaroxaban tablet 20 mg  With dinner          11/02/24 0121     Reason for No Pharmacological VTE Prophylaxis  Once        Question:  Reasons:  Answer:  Risk of Bleeding    11/02/24 0119     IP VTE HIGH RISK PATIENT  Once         11/02/24 0119     Place sequential compression device  Until discontinued         11/02/24 0119                    Discharge Planning   GERARDO:      Code Status: Full Code   Is the patient medically ready for discharge?:     Reason for patient still in hospital (select all that apply): Patient trending condition and Treatment  Discharge Plan A: Home Health                  Ash Choudhury MD  Department of Hospital Medicine   [facility]

## 2024-11-04 NOTE — ASSESSMENT & PLAN NOTE
Patient has Abnormal Magnesium: hypomagnesemia. Will continue to monitor electrolytes closely. Will replace the affected electrolytes and repeat labs to be done after interventions completed. The patient's magnesium results have been reviewed and are listed below.  Recent Labs   Lab 11/03/24  0903   MG 1.8

## 2024-11-04 NOTE — PLAN OF CARE
Pt cleared from CM to [facility].  SOC date: 11/5/24    No other needs to be addressed       11/04/24 1249   Final Note   Assessment Type Final Discharge Note   Anticipated Discharge Disposition Home-Health   What phone number can be called within the next 1-3 days to see how you are doing after discharge? [phone]   Post-Acute Status   Post-Acute Authorization Home Health   Home Health Status Set-up Complete/Auth obtained   Coverage [employer] [place] [employer] [facility]   Discharge Delays None known at this time

## 2024-11-04 NOTE — PLAN OF CARE
Appointment with Dr Jean scheduled for 12/2 at 1115am.  Added to AVS       11/04/24 1155   Discharge Reassessment   Assessment Type Discharge Planning Reassessment   Did the patient's condition or plan change since previous assessment? No   Discharge Plan discussed with: Patient   Communicated GERARDO with patient/caregiver Yes   Discharge Plan A Home Health   Discharge Plan B Home Health   DME Needed Upon Discharge  none   Transition of Care Barriers None   Post-Acute Status   Hospital Resources/Appts/Education Provided Appointments scheduled and added to AVS

## 2024-11-04 NOTE — PLAN OF CARE
ALENA called [facility] and spoke to June who confirmed that pt is on their services.  HH packet sent via [employer] as requested by June. Orders are not needed as pt is still in observation.     11/04/24 0816   Post-Acute Status   Post-Acute Authorization Home Health   Home Health Status Set-up Complete/Auth obtained   Discharge Delays None known at this time   Discharge Plan   Discharge Plan A Home Health   Discharge Plan B Home Health

## 2024-11-04 NOTE — SUBJECTIVE & OBJECTIVE
Interval History:  continues to have significant abdominal pain.  No blood per rectum.  No vomiting.  He's tolerating the clear liquids.  Wants to try solid foods.    Review of Systems   Constitutional:  Negative for fatigue.   Respiratory:  Negative for shortness of breath.      Objective:     Vital Signs (Most Recent):  Temp: 97.8 °F (36.6 °C) (11/03/24 1945)  Pulse: 79 (11/03/24 1945)  Resp: 16 (11/03/24 1945)  BP: 130/67 (11/03/24 1945)  SpO2: 95 % (11/03/24 1945) Vital Signs (24h Range):  Temp:  [97.7 °F (36.5 °C)-98.4 °F (36.9 °C)] 97.8 °F (36.6 °C)  Pulse:  [66-85] 79  Resp:  [15-18] 16  SpO2:  [94 %-97 %] 95 %  BP: (130-153)/(63-73) 130/67     Weight: 133 kg (293 lb 3.4 oz)  Body mass index is 48.79 kg/m².    Intake/Output Summary (Last 24 hours) at 11/3/2024 2031  Last data filed at 11/3/2024 1653  Gross per 24 hour   Intake 1678 ml   Output --   Net 1678 ml         Physical Exam  Vitals reviewed.   Constitutional:       General: He is not in acute distress.     Appearance: He is not diaphoretic.   HENT:      Mouth/Throat:      Mouth: Mucous membranes are moist.   Eyes:      General: No scleral icterus.        Right eye: No discharge.         Left eye: No discharge.   Neck:      Vascular: No JVD.   Cardiovascular:      Rate and Rhythm: Normal rate and regular rhythm.   Pulmonary:      Effort: Pulmonary effort is normal.      Breath sounds: Normal breath sounds.   Abdominal:      General: Bowel sounds are normal. There is no distension.      Palpations: Abdomen is soft.      Tenderness: There is abdominal tenderness.   Skin:     General: Skin is warm.      Findings: No rash.   Neurological:      Mental Status: He is alert.             Significant Labs: All pertinent labs within the past 24 hours have been reviewed.    Significant Imaging: I have reviewed all pertinent imaging results/findings within the past 24 hours.

## 2024-11-04 NOTE — ASSESSMENT & PLAN NOTE
Continue IV systemic steroid.  Reduce dose to 20 mg every 12 hours.  Continue mesalamine.  Advance diet to bland.  Monitor for tolerance.

## 2024-11-04 NOTE — ASSESSMENT & PLAN NOTE
Appears to be hydrated now.  No need to continue the IV crystalloids.  Will encourage patient to drink plenty of fluids.

## 2024-11-04 NOTE — ASSESSMENT & PLAN NOTE
Anemia is likely due to chronic disease due to ulcerative colitis . Most recent hemoglobin and hematocrit are listed below.  Recent Labs     11/01/24  2204 11/02/24  0456 11/03/24  0903   HGB 10.0* 10.1* 10.4*   HCT 32.0* 32.8* 34.4*     Plan  - Monitor serial CBC: Daily  - Transfuse PRBC if patient becomes hemodynamically unstable, symptomatic or H/H drops below 7/21.  - Patient has not received any PRBC transfusions to date  - Patient's anemia is currently stable

## 2024-11-04 NOTE — ASSESSMENT & PLAN NOTE
Patient's FSGs will be monitored  Last A1c reviewed-   Lab Results   Component Value Date    HGBA1C 5.8 11/02/2024       Current correctional scale  Low  Maintain anti-hyperglycemic dose as follows-   Antihyperglycemics (From admission, onward)      Start     Stop Route Frequency Ordered    11/04/24 0000  insulin aspart U-100 pen 0-5 Units         -- SubQ Before meals & nightly PRN 11/03/24 2037          Hold Oral hypoglycemics while patient is in the hospital.

## 2024-11-04 NOTE — ASSESSMENT & PLAN NOTE
Body mass index is 48.79 kg/m². Morbid obesity complicates all aspects of disease management from diagnostic modalities to treatment. Weight loss encouraged and health benefits explained to patient.

## 2024-11-05 ENCOUNTER — EXTERNAL HOME HEALTH (OUTPATIENT)
Dept: HOME HEALTH SERVICES | Facility: HOSPITAL | Age: 49
End: 2024-11-05
Payer: COMMERCIAL

## 2024-11-06 ENCOUNTER — OFFICE VISIT (OUTPATIENT)
Dept: WOUND CARE | Facility: HOSPITAL | Age: 49
End: 2024-11-06
Attending: FAMILY MEDICINE
Payer: COMMERCIAL

## 2024-11-06 ENCOUNTER — TELEPHONE (OUTPATIENT)
Dept: FAMILY MEDICINE | Facility: CLINIC | Age: 49
End: 2024-11-06
Payer: COMMERCIAL

## 2024-11-06 ENCOUNTER — PATIENT OUTREACH (OUTPATIENT)
Dept: ADMINISTRATIVE | Facility: CLINIC | Age: 49
End: 2024-11-06
Payer: COMMERCIAL

## 2024-11-06 VITALS
SYSTOLIC BLOOD PRESSURE: 143 MMHG | DIASTOLIC BLOOD PRESSURE: 88 MMHG | TEMPERATURE: 98 F | RESPIRATION RATE: 19 BRPM | HEART RATE: 80 BPM

## 2024-11-06 DIAGNOSIS — S41.102D OPEN WOUND OF LEFT UPPER ARM, SUBSEQUENT ENCOUNTER: Primary | ICD-10-CM

## 2024-11-06 DIAGNOSIS — L02.818 CUTANEOUS ABSCESS OF OTHER SITE: ICD-10-CM

## 2024-11-06 PROCEDURE — 11042 DBRDMT SUBQ TIS 1ST 20SQCM/<: CPT | Mod: ,,, | Performed by: FAMILY MEDICINE

## 2024-11-06 PROCEDURE — 11042 DBRDMT SUBQ TIS 1ST 20SQCM/<: CPT | Mod: PN | Performed by: FAMILY MEDICINE

## 2024-11-06 PROCEDURE — 99499 UNLISTED E&M SERVICE: CPT | Mod: ,,, | Performed by: FAMILY MEDICINE

## 2024-11-06 NOTE — PROGRESS NOTES
Wound Care Progress Note    Subjective:       Patient ID: Jacoby Jean-Baptiste is a 49 y.o. male.    Chief Complaint: No chief complaint on file.    HPI  Pt seen in clinic for a FU visit for a left arm open wound from abscess I and D. Wound is stable, minimal improvement, no new complaints today  Review of Systems   Skin:  Positive for wound.        Left arm surgical wound   All other systems reviewed and are negative.      Objective:        Physical Exam  Vitals and nursing note reviewed.   Skin:     General: Skin is warm and dry.      Findings: Lesion present.      Comments: Left arm surgical wound, see wound care assessment documentation in chart review scanned under the media tab   Neurological:      General: No focal deficit present.      Mental Status: He is alert.   Psychiatric:         Judgment: Judgment normal.       There were no vitals filed for this visit.    Assessment:           ICD-10-CM ICD-9-CM   1. Open wound of left upper arm, subsequent encounter  S41.102D V58.89     880.03   2. Cutaneous abscess of other site  L02.818 682.8                Plan:                  Diagnoses and all orders for this visit:    Open wound of left upper arm, subsequent encounter    Cutaneous abscess of other site        Please see wound care instructions which have been provided separately.

## 2024-11-06 NOTE — TELEPHONE ENCOUNTER
"Discharge Information     Discharge Date:  11/04/2024          Primary Discharge Diagnosis:  Exacerbation of ulcerative colitis without complication           How patient is feeling since discharge from the hospital?  States he is feeling  better          Medication & Order Review     Discharge Medication Review:    Medication reconciliation performed No   If no, state reason why not performed: N/A       Did patient have any difficulty/problems filling prescriptions?  No           If yes, state reason and steps taken to assist in resolving issue: N/A     Does patient have any questions regarding medications?  No           If yes, state question and steps taken to answer question:  N/A    Was Home Health and/or any equipment ordered for patient upon discharge?  No          Home Health No   If yes, has home health contacted patient and/or initiated services? N/A   Name of Home Health Agency N/A   Durable Medical Equipment (DME)  No (Example: walker, wheelchair, nebulizer)   If yes, has the DME provider contacted patient and delivered equipment? N/A    DME Company N/A     Red Flag Review     Was patient educated on "red flags" or things to watch for?  No       If yes:  "Red flags" patient was told to watch for:                                                    Other:              Is patient experiencing any red flags today (or any of these symptoms) (List examples of red flags specifically)?  No       Notes:                      If no:    Educated the patient on 3 "red flags" to watch for:                                                   Other:                  Is patient experiencing any red flags today (or any of these symptoms) (List examples of red flags specifically)?  No      Notes:        Patient Education & Follow Up               Phone number patient will call if having any questions or problems:  Patient was able to state the phone number for [facility] accurately.    Appointment scheduled?  Yes  "     Follow-up/transition of care appointment, including the date/time and location of your appointment:  Patient was able to state the follow-up appointment correctly.        Notes:            Provided patient with date, time and location of follow-up appointment if they do not have it:  Yes      Rescheduled transition of care appointment if the patient has a conflict:    Appointment re-scheduled?  No        Patient informed of this appointment?  No      Notes:            3 questions patient would like to ask physician during appointment:

## 2024-11-07 NOTE — PROGRESS NOTES
2nd Attempt made to reach patient for TCC call. Left voicemail please call [phone] leave first name, last name, and  Cely will return your call.

## 2024-11-08 ENCOUNTER — OFFICE VISIT (OUTPATIENT)
Dept: FAMILY MEDICINE | Facility: CLINIC | Age: 49
End: 2024-11-08
Payer: COMMERCIAL

## 2024-11-08 VITALS
SYSTOLIC BLOOD PRESSURE: 108 MMHG | DIASTOLIC BLOOD PRESSURE: 74 MMHG | HEIGHT: 65 IN | OXYGEN SATURATION: 98 % | TEMPERATURE: 99 F | WEIGHT: 284.06 LBS | BODY MASS INDEX: 47.33 KG/M2 | HEART RATE: 104 BPM

## 2024-11-08 DIAGNOSIS — Z79.60 LONG-TERM USE OF IMMUNOSUPPRESSANT MEDICATION: Primary | ICD-10-CM

## 2024-11-08 DIAGNOSIS — Z98.890 S/P RADIOFREQUENCY ABLATION OPERATION FOR ARRHYTHMIA: ICD-10-CM
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
## 2024-11-08 DIAGNOSIS — I82.A11 ACUTE DEEP VEIN THROMBOSIS (DVT) OF AXILLARY VEIN OF RIGHT UPPER EXTREMITY: ICD-10-CM

## 2024-11-08 DIAGNOSIS — R42 DIZZINESS: ICD-10-CM

## 2024-11-08 DIAGNOSIS — R55 NEAR SYNCOPE: ICD-10-CM

## 2024-11-08 DIAGNOSIS — Z86.79 S/P RADIOFREQUENCY ABLATION OPERATION FOR ARRHYTHMIA: ICD-10-CM

## 2024-11-08 DIAGNOSIS — K51.919 ULCERATIVE COLITIS WITH COMPLICATION, UNSPECIFIED LOCATION: ICD-10-CM

## 2024-11-08 DIAGNOSIS — E11.9 TYPE 2 DIABETES MELLITUS WITHOUT COMPLICATION, WITHOUT LONG-TERM CURRENT USE OF INSULIN: ICD-10-CM

## 2024-11-08 DIAGNOSIS — Z79.01 ANTICOAGULATED: ICD-10-CM

## 2024-11-08 DIAGNOSIS — Z79.52 LONG TERM SYSTEMIC STEROID USER: ICD-10-CM

## 2024-11-08 PROCEDURE — 3066F NEPHROPATHY DOC TX: CPT | Mod: CPTII,S$GLB,, | Performed by: FAMILY MEDICINE

## 2024-11-08 PROCEDURE — 3061F NEG MICROALBUMINURIA REV: CPT | Mod: CPTII,S$GLB,, | Performed by: FAMILY MEDICINE

## 2024-11-08 PROCEDURE — 3074F SYST BP LT 130 MM HG: CPT | Mod: CPTII,S$GLB,, | Performed by: FAMILY MEDICINE

## 2024-11-08 PROCEDURE — 1160F RVW MEDS BY RX/DR IN RCRD: CPT | Mod: CPTII,S$GLB,, | Performed by: FAMILY MEDICINE

## 2024-11-08 PROCEDURE — 3072F LOW RISK FOR RETINOPATHY: CPT | Mod: CPTII,S$GLB,, | Performed by: FAMILY MEDICINE

## 2024-11-08 PROCEDURE — 3044F HG A1C LEVEL LT 7.0%: CPT | Mod: CPTII,S$GLB,, | Performed by: FAMILY MEDICINE

## 2024-11-08 PROCEDURE — 99999 PR PBB SHADOW E&M-EST. PATIENT-LVL V: CPT | Mod: PBBFAC,,, | Performed by: FAMILY MEDICINE

## 2024-11-08 PROCEDURE — 1159F MED LIST DOCD IN RCRD: CPT | Mod: CPTII,S$GLB,, | Performed by: FAMILY MEDICINE

## 2024-11-08 PROCEDURE — 3078F DIAST BP <80 MM HG: CPT | Mod: CPTII,S$GLB,, | Performed by: FAMILY MEDICINE

## 2024-11-08 PROCEDURE — 1111F DSCHRG MED/CURRENT MED MERGE: CPT | Mod: CPTII,S$GLB,, | Performed by: FAMILY MEDICINE

## 2024-11-08 PROCEDURE — 3008F BODY MASS INDEX DOCD: CPT | Mod: CPTII,S$GLB,, | Performed by: FAMILY MEDICINE

## 2024-11-08 PROCEDURE — 99214 OFFICE O/P EST MOD 30 MIN: CPT | Mod: S$GLB,,, | Performed by: FAMILY MEDICINE

## 2024-11-08 RX ORDER — PROMETHAZINE HYDROCHLORIDE 25 MG/1
25 TABLET ORAL 4 TIMES DAILY PRN
COMMUNITY
Start: 2024-11-05

## 2024-11-08 NOTE — PROGRESS NOTES
C3 nurse attempted to contact patient for a TCC post hospital discharge follow-up call. The patient declined call at this time.            [facility] STATUS NOTE    DATA:  Time:  6:07 AM  Pressures WNL:  YES  Filter Status:  WDL    Problems Reported/Alarms Noted:  None    Supplies Present:  YES    ASSESSMENT:  Patient Net Fluid Balance:  -652 mL since MN   Vital Signs:  Temp 97.5        /55 (71) mmHg    RR 34    SpO2 95%  Labs:  Reviewed  Goals of Therapy:  100 mL/hr, can start/increase pressor to achieve    INTERVENTIONS:   None needed at this time.     PLAN:  Continue plan of care. Contact CRRT resource RN at [phone] with any questions or concerns.

## 2024-11-10 ENCOUNTER — DOCUMENT SCAN (OUTPATIENT)
Dept: HOME HEALTH SERVICES | Facility: HOSPITAL | Age: 49
End: 2024-11-10
Payer: COMMERCIAL

## 2024-11-10 PROBLEM — K51.919 ULCERATIVE COLITIS WITH COMPLICATION: Status: ACTIVE | Noted: 2023-01-11

## 2024-11-10 PROCEDURE — 99499 UNLISTED E&M SERVICE: CPT | Mod: ,,, | Performed by: FAMILY MEDICINE

## 2024-11-11 NOTE — PROGRESS NOTES
Subjective:       Patient ID: Jacoby Jean-Baptiste is a 49 y.o. male.    Chief Complaint: Follow-up ([facility] follow up)    Ulcerative colitis.  Chronic problems flaring up again.  Hospitalized with near syncopal episode.  Fall but no injury.  Long-term steroid use.  Type 2 diabetes.  He is immunosuppressed.  Anticoagulated with Xarelto.  History of DVT in the right arm.  Status post radiofrequency ablation for cardiac arrhythmia.  That is doing well.  Dizziness upon standing.  Okay supine.  Seeing surgery for discussion of colectomy November 19th.  Weight has gone back up with the steroids from 268-284 lb.  Hemoglobin low at 9.7 A1c 5.8.    Physical examination.  Vital signs noted.  Overweight male no acute distress neck without bruit.  Chest clear.  Heart regular rate rhythm.  Abdomen bowel sounds are positive soft no guarding no rebound slightly tender.  Extremities are without edema positive pulses.        Objective:        Assessment:       1. Long-term use of immunosuppressant medication    2. Long term systemic steroid user    3. S/P radiofrequency ablation operation for arrhythmia    4. BMI 45.0-49.9, adult    5. Ulcerative colitis with complication, unspecified location    6. Near syncope    7. Fall, sequela    8. Type 2 diabetes mellitus without complication, without long-term current use of insulin    9. Anticoagulated    10. Acute deep vein thrombosis (DVT) of axillary vein of right upper extremity    11. Dizziness        Plan:       Long-term use of immunosuppressant medication    Long term systemic steroid user    S/P radiofrequency ablation operation for arrhythmia    BMI 45.0-49.9, adult    Ulcerative colitis with complication, unspecified location    Near syncope    Fall, sequela    Type 2 diabetes mellitus without complication, without long-term current use of insulin  -     Basic Metabolic Panel; Future; Expected date: 11/15/2024    Anticoagulated  -     CBC Auto Differential; Future; Expected date:  11/15/2024    Acute deep vein thrombosis (DVT) of axillary vein of right upper extremity    Dizziness    See surgery as planned to discuss colectomy.  Decrease metoprolol XL 25 daily.  Half of a 50 mg.  Check CBC and BMP in 7 days.  Continue other medications as is.  Hopefully he can come off of the steroids which will help his blood sugar.

## 2024-11-13 ENCOUNTER — OFFICE VISIT (OUTPATIENT)
Dept: WOUND CARE | Facility: HOSPITAL | Age: 49
End: 2024-11-13
Attending: FAMILY MEDICINE
Payer: COMMERCIAL

## 2024-11-13 VITALS — DIASTOLIC BLOOD PRESSURE: 87 MMHG | TEMPERATURE: 98 F | HEART RATE: 86 BPM | SYSTOLIC BLOOD PRESSURE: 147 MMHG

## 2024-11-13 DIAGNOSIS — L02.818 CUTANEOUS ABSCESS OF OTHER SITE: ICD-10-CM

## 2024-11-13 DIAGNOSIS — S41.102D OPEN WOUND OF LEFT UPPER ARM, SUBSEQUENT ENCOUNTER: Primary | ICD-10-CM

## 2024-11-13 PROCEDURE — 11042 DBRDMT SUBQ TIS 1ST 20SQCM/<: CPT | Mod: PN | Performed by: FAMILY MEDICINE

## 2024-11-13 PROCEDURE — 11042 DBRDMT SUBQ TIS 1ST 20SQCM/<: CPT | Mod: ,,, | Performed by: FAMILY MEDICINE

## 2024-11-13 PROCEDURE — 99499 UNLISTED E&M SERVICE: CPT | Mod: ,,, | Performed by: FAMILY MEDICINE

## 2024-11-13 NOTE — PROGRESS NOTES
Wound Care Progress Note    Subjective:       Patient ID: Jacoby Jean-Baptiste is a 49 y.o. male.    Chief Complaint: No chief complaint on file.    HPI  Pt seen in clinic for a FU visit for a left arm surgical wound. Wound is improving slightly, no new complaints today  Review of Systems   Skin:  Positive for wound.        Open wound left arm   All other systems reviewed and are negative.      Objective:        Physical Exam  Vitals and nursing note reviewed.   Constitutional:       Appearance: Normal appearance. He is not diaphoretic.   Skin:     General: Skin is warm.      Findings: Lesion present.      Comments: Left arm surgical wound, see wound care assessment documentation in chart review scanned under the media tab   Neurological:      General: No focal deficit present.      Mental Status: He is alert.   Psychiatric:         Judgment: Judgment normal.       There were no vitals filed for this visit.    Assessment:           ICD-10-CM ICD-9-CM   1. Open wound of left upper arm, subsequent encounter  S41.102D V58.89     880.03   2. Cutaneous abscess of other site  L02.818 682.8                Plan:                  Diagnoses and all orders for this visit:    Open wound of left upper arm, subsequent encounter    Cutaneous abscess of other site      Much of the documentation for this visit was completed in the W[facility] Docs system.  Please see the attached documentation for further details about the patient's care. Scanned under the Media tab.

## 2024-11-15 ENCOUNTER — TELEPHONE (OUTPATIENT)
Dept: FAMILY MEDICINE | Facility: CLINIC | Age: 49
End: 2024-11-15
Payer: COMMERCIAL

## 2024-11-15 ENCOUNTER — LAB VISIT (OUTPATIENT)
Dept: LAB | Facility: HOSPITAL | Age: 49
End: 2024-11-15
Attending: FAMILY MEDICINE
Payer: COMMERCIAL

## 2024-11-15 DIAGNOSIS — E11.9 TYPE 2 DIABETES MELLITUS WITHOUT COMPLICATION, WITHOUT LONG-TERM CURRENT USE OF INSULIN: ICD-10-CM

## 2024-11-15 DIAGNOSIS — Z79.01 ANTICOAGULATED: ICD-10-CM

## 2024-11-15 LAB
ANION GAP SERPL CALC-SCNC: 10 MMOL/L (ref 8–16)
BASOPHILS # BLD AUTO: 0.09 K/UL (ref 0–0.2)
BASOPHILS NFR BLD: 0.7 % (ref 0–1.9)
BUN SERPL-MCNC: 15 MG/DL (ref 6–20)
CALCIUM SERPL-MCNC: 9.3 MG/DL (ref 8.7–10.5)
CHLORIDE SERPL-SCNC: 105 MMOL/L (ref 95–110)
CO2 SERPL-SCNC: 25 MMOL/L (ref 23–29)
CREAT SERPL-MCNC: 1.1 MG/DL (ref 0.5–1.4)
DIFFERENTIAL METHOD BLD: ABNORMAL
EOSINOPHIL # BLD AUTO: 0.4 K/UL (ref 0–0.5)
EOSINOPHIL NFR BLD: 3.1 % (ref 0–8)
ERYTHROCYTE [DISTWIDTH] IN BLOOD BY AUTOMATED COUNT: 14.9 % (ref 11.5–14.5)
EST. GFR  (NO RACE VARIABLE): >60 ML/MIN/1.73 M^2
GLUCOSE SERPL-MCNC: 85 MG/DL (ref 70–110)
HCT VFR BLD AUTO: 35.3 % (ref 40–54)
HGB BLD-MCNC: 10.5 G/DL (ref 14–18)
IMM GRANULOCYTES # BLD AUTO: 0.07 K/UL (ref 0–0.04)
IMM GRANULOCYTES NFR BLD AUTO: 0.5 % (ref 0–0.5)
LYMPHOCYTES # BLD AUTO: 1.5 K/UL (ref 1–4.8)
LYMPHOCYTES NFR BLD: 11 % (ref 18–48)
MCH RBC QN AUTO: 27.4 PG (ref 27–31)
MCHC RBC AUTO-ENTMCNC: 29.7 G/DL (ref 32–36)
MCV RBC AUTO: 92 FL (ref 82–98)
MONOCYTES # BLD AUTO: 1 K/UL (ref 0.3–1)
MONOCYTES NFR BLD: 7.2 % (ref 4–15)
NEUTROPHILS # BLD AUTO: 10.6 K/UL (ref 1.8–7.7)
NEUTROPHILS NFR BLD: 77.5 % (ref 38–73)
NRBC BLD-RTO: 0 /100 WBC
PLATELET # BLD AUTO: 313 K/UL (ref 150–450)
PMV BLD AUTO: 10.4 FL (ref 9.2–12.9)
POTASSIUM SERPL-SCNC: 4.1 MMOL/L (ref 3.5–5.1)
RBC # BLD AUTO: 3.83 M/UL (ref 4.6–6.2)
SODIUM SERPL-SCNC: 140 MMOL/L (ref 136–145)
WBC # BLD AUTO: 13.7 K/UL (ref 3.9–12.7)

## 2024-11-15 PROCEDURE — 85025 COMPLETE CBC W/AUTO DIFF WBC: CPT | Performed by: FAMILY MEDICINE

## 2024-11-15 PROCEDURE — 36415 COLL VENOUS BLD VENIPUNCTURE: CPT | Performed by: FAMILY MEDICINE

## 2024-11-15 PROCEDURE — 80048 BASIC METABOLIC PNL TOTAL CA: CPT | Performed by: FAMILY MEDICINE

## 2024-11-15 NOTE — TELEPHONE ENCOUNTER
Spoke with  nurse , dr rodney said to go back up to the whole pill of the toprol xl 50 mg a day since bp was 150/90.

## 2024-11-17 ENCOUNTER — HOSPITAL ENCOUNTER (INPATIENT)
Facility: HOSPITAL | Age: 49
LOS: 4 days | Discharge: HOME HEALTH | DRG: 357 | End: 2024-11-21
Attending: EMERGENCY MEDICINE | Admitting: INTERNAL MEDICINE
Payer: COMMERCIAL

## 2024-11-17 DIAGNOSIS — K62.5 RECTAL BLEEDING: ICD-10-CM

## 2024-11-17 DIAGNOSIS — R10.9 ABDOMINAL PAIN, UNSPECIFIED ABDOMINAL LOCATION: Primary | ICD-10-CM

## 2024-11-17 DIAGNOSIS — K51.919 ULCERATIVE COLITIS WITH COMPLICATION, UNSPECIFIED LOCATION: ICD-10-CM

## 2024-11-17 DIAGNOSIS — R07.9 CHEST PAIN: ICD-10-CM

## 2024-11-17 DIAGNOSIS — K51.90 UC (ULCERATIVE COLITIS): ICD-10-CM

## 2024-11-17 DIAGNOSIS — S41.102D OPEN WOUND OF LEFT UPPER ARM, SUBSEQUENT ENCOUNTER: ICD-10-CM

## 2024-11-17 LAB
ALBUMIN SERPL BCP-MCNC: 3.8 G/DL (ref 3.5–5.2)
ALP SERPL-CCNC: 68 U/L (ref 55–135)
ALT SERPL W/O P-5'-P-CCNC: 8 U/L (ref 10–44)
ANION GAP SERPL CALC-SCNC: 6 MMOL/L (ref 8–16)
AST SERPL-CCNC: 11 U/L (ref 10–40)
BASOPHILS # BLD AUTO: 0.08 K/UL (ref 0–0.2)
BASOPHILS NFR BLD: 0.5 % (ref 0–1.9)
BILIRUB SERPL-MCNC: 0.4 MG/DL (ref 0.1–1)
BILIRUB UR QL STRIP: NEGATIVE
BUN SERPL-MCNC: 12 MG/DL (ref 6–20)
C DIFF GDH STL QL: NEGATIVE
C DIFF TOX A+B STL QL IA: NEGATIVE
CALCIUM SERPL-MCNC: 9 MG/DL (ref 8.7–10.5)
CHLORIDE SERPL-SCNC: 108 MMOL/L (ref 95–110)
CLARITY UR: CLEAR
CO2 SERPL-SCNC: 28 MMOL/L (ref 23–29)
COLOR UR: YELLOW
CREAT SERPL-MCNC: 1.1 MG/DL (ref 0.5–1.4)
DIFFERENTIAL METHOD BLD: ABNORMAL
EOSINOPHIL # BLD AUTO: 0.3 K/UL (ref 0–0.5)
EOSINOPHIL NFR BLD: 1.9 % (ref 0–8)
ERYTHROCYTE [DISTWIDTH] IN BLOOD BY AUTOMATED COUNT: 14.9 % (ref 11.5–14.5)
EST. GFR  (NO RACE VARIABLE): >60 ML/MIN/1.73 M^2
GLUCOSE SERPL-MCNC: 122 MG/DL (ref 70–110)
GLUCOSE UR QL STRIP: NEGATIVE
HCT VFR BLD AUTO: 34.3 % (ref 40–54)
HGB BLD-MCNC: 10.7 G/DL (ref 14–18)
HGB UR QL STRIP: ABNORMAL
IMM GRANULOCYTES # BLD AUTO: 0.07 K/UL (ref 0–0.04)
IMM GRANULOCYTES NFR BLD AUTO: 0.4 % (ref 0–0.5)
KETONES UR QL STRIP: NEGATIVE
LEUKOCYTE ESTERASE UR QL STRIP: NEGATIVE
LIPASE SERPL-CCNC: 8 U/L (ref 4–60)
LYMPHOCYTES # BLD AUTO: 1.2 K/UL (ref 1–4.8)
LYMPHOCYTES NFR BLD: 6.5 % (ref 18–48)
MCH RBC QN AUTO: 28.4 PG (ref 27–31)
MCHC RBC AUTO-ENTMCNC: 31.2 G/DL (ref 32–36)
MCV RBC AUTO: 91 FL (ref 82–98)
MICROSCOPIC COMMENT: NORMAL
MONOCYTES # BLD AUTO: 1.1 K/UL (ref 0.3–1)
MONOCYTES NFR BLD: 6.1 % (ref 4–15)
NEUTROPHILS # BLD AUTO: 15 K/UL (ref 1.8–7.7)
NEUTROPHILS NFR BLD: 84.6 % (ref 38–73)
NITRITE UR QL STRIP: NEGATIVE
NRBC BLD-RTO: 0 /100 WBC
PH UR STRIP: 6 [PH] (ref 5–8)
PLATELET # BLD AUTO: 295 K/UL (ref 150–450)
PMV BLD AUTO: 10.3 FL (ref 9.2–12.9)
POTASSIUM SERPL-SCNC: 3.8 MMOL/L (ref 3.5–5.1)
PROT SERPL-MCNC: 6.9 G/DL (ref 6–8.4)
PROT UR QL STRIP: NEGATIVE
RBC # BLD AUTO: 3.77 M/UL (ref 4.6–6.2)
RBC #/AREA URNS HPF: 4 /HPF (ref 0–4)
SODIUM SERPL-SCNC: 142 MMOL/L (ref 136–145)
SP GR UR STRIP: 1.02 (ref 1–1.03)
SQUAMOUS #/AREA URNS HPF: 0 /HPF
URN SPEC COLLECT METH UR: ABNORMAL
UROBILINOGEN UR STRIP-ACNC: NEGATIVE EU/DL
WBC # BLD AUTO: 17.69 K/UL (ref 3.9–12.7)
WBC #/AREA URNS HPF: 2 /HPF (ref 0–5)

## 2024-11-17 PROCEDURE — 96374 THER/PROPH/DIAG INJ IV PUSH: CPT

## 2024-11-17 PROCEDURE — 21400001 HC TELEMETRY ROOM

## 2024-11-17 PROCEDURE — 83690 ASSAY OF LIPASE: CPT

## 2024-11-17 PROCEDURE — 87324 CLOSTRIDIUM AG IA: CPT | Performed by: INTERNAL MEDICINE

## 2024-11-17 PROCEDURE — 63600175 PHARM REV CODE 636 W HCPCS: Performed by: NURSE PRACTITIONER

## 2024-11-17 PROCEDURE — 27000207 HC ISOLATION

## 2024-11-17 PROCEDURE — 25000003 PHARM REV CODE 250: Performed by: INTERNAL MEDICINE

## 2024-11-17 PROCEDURE — 63600175 PHARM REV CODE 636 W HCPCS: Performed by: EMERGENCY MEDICINE

## 2024-11-17 PROCEDURE — 96375 TX/PRO/DX INJ NEW DRUG ADDON: CPT

## 2024-11-17 PROCEDURE — 83993 ASSAY FOR CALPROTECTIN FECAL: CPT | Performed by: INTERNAL MEDICINE

## 2024-11-17 PROCEDURE — 25000003 PHARM REV CODE 250: Performed by: EMERGENCY MEDICINE

## 2024-11-17 PROCEDURE — 25000003 PHARM REV CODE 250: Performed by: NURSE PRACTITIONER

## 2024-11-17 PROCEDURE — 63600175 PHARM REV CODE 636 W HCPCS: Performed by: INTERNAL MEDICINE

## 2024-11-17 PROCEDURE — 96361 HYDRATE IV INFUSION ADD-ON: CPT

## 2024-11-17 PROCEDURE — 81001 URINALYSIS AUTO W/SCOPE: CPT

## 2024-11-17 PROCEDURE — 99285 EMERGENCY DEPT VISIT HI MDM: CPT | Mod: 25

## 2024-11-17 PROCEDURE — 25500020 PHARM REV CODE 255: Performed by: EMERGENCY MEDICINE

## 2024-11-17 PROCEDURE — 63600175 PHARM REV CODE 636 W HCPCS: Mod: JZ,JG | Performed by: INTERNAL MEDICINE

## 2024-11-17 PROCEDURE — 85025 COMPLETE CBC W/AUTO DIFF WBC: CPT

## 2024-11-17 PROCEDURE — 80053 COMPREHEN METABOLIC PANEL: CPT

## 2024-11-17 PROCEDURE — 87329 GIARDIA AG IA: CPT | Performed by: INTERNAL MEDICINE

## 2024-11-17 RX ORDER — SODIUM,POTASSIUM PHOSPHATES 280-250MG
2 POWDER IN PACKET (EA) ORAL
Status: DISCONTINUED | OUTPATIENT
Start: 2024-11-17 | End: 2024-11-21 | Stop reason: HOSPADM

## 2024-11-17 RX ORDER — HYDROCODONE BITARTRATE AND ACETAMINOPHEN 5; 325 MG/1; MG/1
1 TABLET ORAL EVERY 6 HOURS PRN
Status: DISCONTINUED | OUTPATIENT
Start: 2024-11-17 | End: 2024-11-21 | Stop reason: HOSPADM

## 2024-11-17 RX ORDER — ZOLPIDEM TARTRATE 5 MG/1
10 TABLET ORAL NIGHTLY PRN
Status: DISCONTINUED | OUTPATIENT
Start: 2024-11-17 | End: 2024-11-21 | Stop reason: HOSPADM

## 2024-11-17 RX ORDER — ATORVASTATIN CALCIUM 10 MG/1
10 TABLET, FILM COATED ORAL DAILY
Status: DISCONTINUED | OUTPATIENT
Start: 2024-11-18 | End: 2024-11-21 | Stop reason: HOSPADM

## 2024-11-17 RX ORDER — METRONIDAZOLE 500 MG/100ML
500 INJECTION, SOLUTION INTRAVENOUS
Status: DISCONTINUED | OUTPATIENT
Start: 2024-11-17 | End: 2024-11-21 | Stop reason: HOSPADM

## 2024-11-17 RX ORDER — METOPROLOL SUCCINATE 50 MG/1
50 TABLET, EXTENDED RELEASE ORAL DAILY
Status: DISCONTINUED | OUTPATIENT
Start: 2024-11-18 | End: 2024-11-21 | Stop reason: HOSPADM

## 2024-11-17 RX ORDER — SODIUM CHLORIDE 0.9 % (FLUSH) 0.9 %
2 SYRINGE (ML) INJECTION EVERY 12 HOURS PRN
Status: DISCONTINUED | OUTPATIENT
Start: 2024-11-17 | End: 2024-11-21 | Stop reason: HOSPADM

## 2024-11-17 RX ORDER — HYDROMORPHONE HYDROCHLORIDE 1 MG/ML
0.5 INJECTION, SOLUTION INTRAMUSCULAR; INTRAVENOUS; SUBCUTANEOUS
Status: COMPLETED | OUTPATIENT
Start: 2024-11-17 | End: 2024-11-17

## 2024-11-17 RX ORDER — GLUCAGON 1 MG
1 KIT INJECTION
Status: DISCONTINUED | OUTPATIENT
Start: 2024-11-17 | End: 2024-11-17

## 2024-11-17 RX ORDER — SODIUM CHLORIDE 9 MG/ML
INJECTION, SOLUTION INTRAVENOUS CONTINUOUS
Status: DISCONTINUED | OUTPATIENT
Start: 2024-11-17 | End: 2024-11-21

## 2024-11-17 RX ORDER — LANOLIN ALCOHOL/MO/W.PET/CERES
800 CREAM (GRAM) TOPICAL
Status: DISCONTINUED | OUTPATIENT
Start: 2024-11-17 | End: 2024-11-21 | Stop reason: HOSPADM

## 2024-11-17 RX ORDER — PANTOPRAZOLE SODIUM 40 MG/10ML
40 INJECTION, POWDER, LYOPHILIZED, FOR SOLUTION INTRAVENOUS 2 TIMES DAILY
Status: DISCONTINUED | OUTPATIENT
Start: 2024-11-17 | End: 2024-11-17

## 2024-11-17 RX ORDER — AMOXICILLIN 250 MG
1 CAPSULE ORAL 2 TIMES DAILY PRN
Status: DISCONTINUED | OUTPATIENT
Start: 2024-11-17 | End: 2024-11-21 | Stop reason: HOSPADM

## 2024-11-17 RX ORDER — SODIUM CHLORIDE 0.9 % (FLUSH) 0.9 %
10 SYRINGE (ML) INJECTION
Status: DISCONTINUED | OUTPATIENT
Start: 2024-11-17 | End: 2024-11-21 | Stop reason: HOSPADM

## 2024-11-17 RX ORDER — HYDROMORPHONE HYDROCHLORIDE 1 MG/ML
0.5 INJECTION, SOLUTION INTRAMUSCULAR; INTRAVENOUS; SUBCUTANEOUS EVERY 4 HOURS PRN
Status: DISCONTINUED | OUTPATIENT
Start: 2024-11-17 | End: 2024-11-21

## 2024-11-17 RX ORDER — INSULIN ASPART 100 [IU]/ML
0-5 INJECTION, SOLUTION INTRAVENOUS; SUBCUTANEOUS
Status: DISCONTINUED | OUTPATIENT
Start: 2024-11-17 | End: 2024-11-17

## 2024-11-17 RX ORDER — NALOXONE HCL 0.4 MG/ML
0.02 VIAL (ML) INJECTION
Status: DISCONTINUED | OUTPATIENT
Start: 2024-11-17 | End: 2024-11-21 | Stop reason: HOSPADM

## 2024-11-17 RX ORDER — PANTOPRAZOLE SODIUM 40 MG/10ML
80 INJECTION, POWDER, LYOPHILIZED, FOR SOLUTION INTRAVENOUS
Status: COMPLETED | OUTPATIENT
Start: 2024-11-17 | End: 2024-11-17

## 2024-11-17 RX ORDER — BUSPIRONE HYDROCHLORIDE 5 MG/1
15 TABLET ORAL 3 TIMES DAILY
Status: DISCONTINUED | OUTPATIENT
Start: 2024-11-17 | End: 2024-11-21 | Stop reason: HOSPADM

## 2024-11-17 RX ORDER — ONDANSETRON HYDROCHLORIDE 2 MG/ML
4 INJECTION, SOLUTION INTRAVENOUS
Status: COMPLETED | OUTPATIENT
Start: 2024-11-17 | End: 2024-11-17

## 2024-11-17 RX ORDER — VANCOMYCIN HYDROCHLORIDE 125 MG/1
125 CAPSULE ORAL 4 TIMES DAILY
Status: DISCONTINUED | OUTPATIENT
Start: 2024-11-17 | End: 2024-11-17

## 2024-11-17 RX ORDER — LORAZEPAM 0.5 MG/1
0.5 TABLET ORAL EVERY 6 HOURS PRN
Status: DISCONTINUED | OUTPATIENT
Start: 2024-11-17 | End: 2024-11-21 | Stop reason: HOSPADM

## 2024-11-17 RX ORDER — TALC
9 POWDER (GRAM) TOPICAL NIGHTLY PRN
Status: DISCONTINUED | OUTPATIENT
Start: 2024-11-17 | End: 2024-11-21 | Stop reason: HOSPADM

## 2024-11-17 RX ORDER — SERTRALINE HYDROCHLORIDE 50 MG/1
50 TABLET, FILM COATED ORAL DAILY
Status: DISCONTINUED | OUTPATIENT
Start: 2024-11-18 | End: 2024-11-18

## 2024-11-17 RX ORDER — IBUPROFEN 200 MG
16 TABLET ORAL
Status: DISCONTINUED | OUTPATIENT
Start: 2024-11-17 | End: 2024-11-17

## 2024-11-17 RX ORDER — ACETAMINOPHEN 325 MG/1
650 TABLET ORAL EVERY 4 HOURS PRN
Status: DISCONTINUED | OUTPATIENT
Start: 2024-11-17 | End: 2024-11-21 | Stop reason: HOSPADM

## 2024-11-17 RX ORDER — IBUPROFEN 200 MG
24 TABLET ORAL
Status: DISCONTINUED | OUTPATIENT
Start: 2024-11-17 | End: 2024-11-17

## 2024-11-17 RX ORDER — CIPROFLOXACIN 2 MG/ML
400 INJECTION, SOLUTION INTRAVENOUS
Status: DISCONTINUED | OUTPATIENT
Start: 2024-11-17 | End: 2024-11-21 | Stop reason: HOSPADM

## 2024-11-17 RX ORDER — ONDANSETRON HYDROCHLORIDE 2 MG/ML
4 INJECTION, SOLUTION INTRAVENOUS EVERY 6 HOURS PRN
Status: DISCONTINUED | OUTPATIENT
Start: 2024-11-17 | End: 2024-11-21 | Stop reason: HOSPADM

## 2024-11-17 RX ORDER — FAMOTIDINE 20 MG/1
20 TABLET, FILM COATED ORAL 2 TIMES DAILY
Status: DISCONTINUED | OUTPATIENT
Start: 2024-11-17 | End: 2024-11-21 | Stop reason: HOSPADM

## 2024-11-17 RX ADMIN — ZOLPIDEM TARTRATE 10 MG: 5 TABLET, COATED ORAL at 10:11

## 2024-11-17 RX ADMIN — CIPROFLOXACIN 400 MG: 2 INJECTION, SOLUTION INTRAVENOUS at 10:11

## 2024-11-17 RX ADMIN — SODIUM CHLORIDE, POTASSIUM CHLORIDE, SODIUM LACTATE AND CALCIUM CHLORIDE 1000 ML: 600; 310; 30; 20 INJECTION, SOLUTION INTRAVENOUS at 01:11

## 2024-11-17 RX ADMIN — HYDROMORPHONE HYDROCHLORIDE 0.5 MG: 1 INJECTION, SOLUTION INTRAMUSCULAR; INTRAVENOUS; SUBCUTANEOUS at 11:11

## 2024-11-17 RX ADMIN — METHYLPREDNISOLONE SODIUM SUCCINATE 30 MG: 40 INJECTION, POWDER, FOR SOLUTION INTRAMUSCULAR; INTRAVENOUS at 05:11

## 2024-11-17 RX ADMIN — METRONIDAZOLE 500 MG: 500 INJECTION, SOLUTION INTRAVENOUS at 08:11

## 2024-11-17 RX ADMIN — VANCOMYCIN HYDROCHLORIDE 125 MG: KIT at 05:11

## 2024-11-17 RX ADMIN — SODIUM CHLORIDE: 9 INJECTION, SOLUTION INTRAVENOUS at 08:11

## 2024-11-17 RX ADMIN — HYDROCODONE BITARTRATE AND ACETAMINOPHEN 1 TABLET: 5; 325 TABLET ORAL at 08:11

## 2024-11-17 RX ADMIN — PANTOPRAZOLE SODIUM 80 MG: 40 INJECTION, POWDER, FOR SOLUTION INTRAVENOUS at 01:11

## 2024-11-17 RX ADMIN — IOHEXOL 100 ML: 350 INJECTION, SOLUTION INTRAVENOUS at 02:11

## 2024-11-17 RX ADMIN — PIPERACILLIN SODIUM AND TAZOBACTAM SODIUM 3.38 G: 3; .375 INJECTION, POWDER, FOR SOLUTION INTRAVENOUS at 05:11

## 2024-11-17 RX ADMIN — BUSPIRONE HYDROCHLORIDE 15 MG: 5 TABLET ORAL at 08:11

## 2024-11-17 RX ADMIN — ONDANSETRON 4 MG: 2 INJECTION INTRAMUSCULAR; INTRAVENOUS at 01:11

## 2024-11-17 RX ADMIN — ONDANSETRON 4 MG: 2 INJECTION INTRAMUSCULAR; INTRAVENOUS at 10:11

## 2024-11-17 RX ADMIN — HYDROMORPHONE HYDROCHLORIDE 0.5 MG: 1 INJECTION, SOLUTION INTRAMUSCULAR; INTRAVENOUS; SUBCUTANEOUS at 06:11

## 2024-11-17 RX ADMIN — HYDROMORPHONE HYDROCHLORIDE 0.5 MG: 0.5 INJECTION, SOLUTION INTRAMUSCULAR; INTRAVENOUS; SUBCUTANEOUS at 01:11

## 2024-11-17 RX ADMIN — FAMOTIDINE 20 MG: 20 TABLET ORAL at 08:11

## 2024-11-17 NOTE — ASSESSMENT & PLAN NOTE
Anemia is likely due to chronic blood loss. Most recent hemoglobin and hematocrit are listed below.  Recent Labs     11/15/24  1027 11/17/24  1159   HGB 10.5* 10.7*   HCT 35.3* 34.3*     Plan  - Monitor serial CBC: Daily  - Transfuse PRBC if patient becomes hemodynamically unstable, symptomatic or H/H drops below 7/21.  - Patient's anemia is currently stable  - monitor for overt bleeding  GI following  Transfusing if hgb <7

## 2024-11-17 NOTE — ED PROVIDER NOTES
Encounter Date: 11/17/2024       History     Chief Complaint   Patient presents with    Abdominal Pain     X FEW DAYS, MOSTLY LEFT SIDE    Rectal Bleeding     SOFT STOOL WITH BLOOD NOTED     49-year-old male with history of hypertension, non-insulin-dependent diabetes, obesity, hyperlipidemia, ulcerative colitis, previous DVT on Xarelto.  Patient presents emergency department with complaint of abdominal pain and rectal bleeding which was noted over last 2-3 days.  Patient states symptoms worsening today.  Patient followed by Dr. [facility].  Patient denies fever, does endorse generalized weakness.  Denies any other constitutional symptoms.      Review of patient's allergies indicates:  No Known Allergies  Past Medical History:   Diagnosis Date    C. difficile colitis     Cavitary pneumonia 11/2023    pos aspergillus serology    Diabetes mellitus 01/2022    Hyperlipidemia 2015    Hypertension 2015    Insomnia 2015    Ulcerative colitis      Past Surgical History:   Procedure Laterality Date    BRONCHOSCOPY WITH FLUOROSCOPY Left 11/20/2023    Procedure: BRONCHOSCOPY, WITH FLUOROSCOPY;  Surgeon: Lisa Villarreal MD;  Location: [facility];  Service: Pulmonary;  Laterality: Left;    BRONCHOSCOPY WITH FLUOROSCOPY N/A 11/30/2023    Procedure: BRONCHOSCOPY, WITH FLUOROSCOPY;  Surgeon: Lisa Villarreal MD;  Location: [facility];  Service: Pulmonary;  Laterality: N/A;    CARDIAC ELECTROPHYSIOLOGY MAPPING AND ABLATION  2017    SVT    CHOLECYSTECTOMY  2011    COLONOSCOPY N/A 5/3/2022    Procedure: COLONOSCOPY;  Surgeon: Shan Jean III, MD;  Location: [facility];  Service: Endoscopy;  Laterality: N/A;    COLONOSCOPY N/A 3/16/2024    Procedure: COLONOSCOPY;  Surgeon: ALEKSANDER Ramos MD;  Location: [facility];  Service: Endoscopy;  Laterality: N/A;    COLONOSCOPY WITH FECAL MICROBIOTA TRANSFER N/A 3/21/2023    Procedure: COLONOSCOPY, WITH FECAL MICROBIOTA TRANSFER;  Surgeon: David Millan MD;   Location[facility];  Service: Endoscopy;  Laterality: N/A;    ESOPHAGOGASTRODUODENOSCOPY N/A 4/24/2023    Procedure: EGD (ESOPHAGOGASTRODUODENOSCOPY);  Surgeon: Shan Jean III, MD;  Location: [facility] ENDO;  Service: Endoscopy;  Laterality: N/A;    ESOPHAGOGASTRODUODENOSCOPY N/A 6/5/2024    Procedure: EGD (ESOPHAGOGASTRODUODENOSCOPY);  Surgeon: Odalis Mccabe MD;  Location: [facility] ENDO;  Service: Endoscopy;  Laterality: N/A;    FLEXIBLE SIGMOIDOSCOPY N/A 6/5/2024    Procedure: SIGMOIDOSCOPY, FLEXIBLE;  Surgeon: Odalis Mccabe MD;  Location: [facility] ENDO;  Service: Endoscopy;  Laterality: N/A;    FLEXIBLE SIGMOIDOSCOPY N/A 7/16/2024    Procedure: SIGMOIDOSCOPY, FLEXIBLE;  Surgeon: Shan Jean III, MD;  Location: [facility] ENDO;  Service: Endoscopy;  Laterality: N/A;    FLEXIBLE SIGMOIDOSCOPY N/A 8/13/2024    Procedure: SIGMOIDOSCOPY, FLEXIBLE;  Surgeon: Shan Jean III, MD;  Location: [facility];  Service: Endoscopy;  Laterality: N/A;    GANGLION CYST EXCISION Left 1992    UMBILICAL HERNIA REPAIR  2011    with mesh     Family History   Problem Relation Name Age of Onset    Asthma Mother       Social History     Tobacco Use    Smoking status: Former     Average packs/day: 1 pack/day for 30.0 years (30.0 ttl pk-yrs)     Types: Cigarettes     Start date: 1993    Smokeless tobacco: Never    Tobacco comments:     Pt states he has stopped smoking with Chantix three weeks ago. 12/1/23   Substance Use Topics    Alcohol use: Yes     Comment: ocass    Drug use: Not Currently     Review of Systems    Physical Exam     Initial Vitals [11/17/24 1107]   BP Pulse Resp Temp SpO2   133/83 104 18 98.5 °F (36.9 °C) 100 %      MAP       --         Physical Exam    ED Course   Procedures  Labs Reviewed   CBC W/ AUTO DIFFERENTIAL - Abnormal       Result Value    WBC 17.69 (*)     RBC 3.77 (*)     Hemoglobin 10.7 (*)     Hematocrit 34.3 (*)     MCV 91      MCH 28.4      MCHC 31.2 (*)     RDW 14.9 (*)     Platelets 295       MPV 10.3      Immature Granulocytes 0.4      Gran # (ANC) 15.0 (*)     Immature Grans (Abs) 0.07 (*)     Lymph # 1.2      Mono # 1.1 (*)     Eos # 0.3      Baso # 0.08      nRBC 0      Gran % 84.6 (*)     Lymph % 6.5 (*)     Mono % 6.1      Eosinophil % 1.9      Basophil % 0.5      Differential Method Automated     COMPREHENSIVE METABOLIC PANEL - Abnormal    Sodium 142      Potassium 3.8      Chloride 108      CO2 28      Glucose 122 (*)     BUN 12      Creatinine 1.1      Calcium 9.0      Total Protein 6.9      Albumin 3.8      Total Bilirubin 0.4      Alkaline Phosphatase 68      AST 11      ALT 8 (*)     eGFR >60.0      Anion Gap 6 (*)    URINALYSIS, REFLEX TO URINE CULTURE - Abnormal    Specimen UA Urine, Clean Catch      Color, UA Yellow      Appearance, UA Clear      pH, UA 6.0      Specific Gravity, UA 1.020      Protein, UA Negative      Glucose, UA Negative      Ketones, UA Negative      Bilirubin (UA) Negative      Occult Blood UA 1+ (*)     Nitrite, UA Negative      Urobilinogen, UA Negative      Leukocytes, UA Negative      Narrative:     Specimen Source->Urine   CLOSTRIDIUM DIFFICILE    C. diff Antigen Negative      C difficile Toxins A+B, EIA Negative     GIARDIA SPECIFIC ANTIGEN    Giardia lamblia Ag Source stool     LIPASE    Lipase 8     URINALYSIS MICROSCOPIC    RBC, UA 4      WBC, UA 2      Squam Epithel, UA 0      Microscopic Comment SEE COMMENT      Narrative:     Specimen Source->Urine   CALPROTECTIN, STOOL          Imaging Results              CT Abdomen Pelvis With IV Contrast NO Oral Contrast (Final result)  Result time 11/17/24 15:08:38      Final result by Rickie Brown MD (11/17/24 15:08:38)                   Impression:      Wall thickening and pericolonic inflammatory changes involving the descending and proximal sigmoid colon consistent with infectious/inflammatory colitis.  Findings are similar to previous CT exams from earlier this month and September of this year.  Consider  follow-up with GI/colonoscopy.    Nonobstructing nephrolithiasis.    Borderline hepatic steatosis.      Electronically signed by: Rickie Brown  Date:    11/17/2024  Time:    15:08               Narrative:    EXAMINATION:  CT ABDOMEN PELVIS WITH IV CONTRAST    CLINICAL HISTORY:  Abdominal pain;    TECHNIQUE:  Axial CT imaging obtained through the abdomen and pelvis after 100 mL Omnipaque 350.  Coronal and sagittal reformatted images.    CMS Mandated Quality Data-CT Radiation Dose-436    All CT scans at this facility dose modulation, iterative reconstruction, and or weight-based dosing when appropriate to reduce radiation dose to as low as reasonably achievable.    COMPARISON:  CT abdomen and pelvis 11/01/2024 and 09/28/2024 previous exams.    FINDINGS:  Lung bases are clear.  Bone window images show no acute or aggressive osseous abnormality.  Degenerative changes of the spine and hips.    Borderline hepatic steatosis.  No focal hepatic lesion.  Gallbladder is absent.  No intrahepatic or extrahepatic bile duct dilation.  Portal vein is patent.  Spleen is unremarkable.  Small left upper quadrant splenule.  Pancreas is within normal limits.  Duodenal diverticulum noted.  No adrenal lesion.  Tiny nonobstructing right renal calculi.  Numerous nonobstructing left renal calculi ranging in size from 3 mm to 6 mm.  Ureters are normal in caliber.  Urinary bladder shows no focal lesion.    Stomach is unremarkable.  No evidence of small-bowel obstruction.  Normal appendix.  Proximal colon is unremarkable.  Lack of haustration involving the distal transverse colon.  Descending colon shows wall thickening with pericolonic inflammatory stranding and prominent vascularity which is similar to previous exams.    No free fluid, free air, or abscess in the abdomen or pelvis.  No lymphadenopathy.                                       Medications   methylPREDNISolone sodium succinate injection 30 mg (30 mg Intravenous Given 11/17/24  1700)   vancomycin 125 mg/5 mL oral solution 125 mg (125 mg Oral Given 11/17/24 1737)   sodium chloride 0.9% flush 10 mL (has no administration in time range)   acetaminophen tablet 650 mg (has no administration in time range)   ondansetron injection 4 mg (4 mg Intravenous Given 11/17/24 2213)   HYDROmorphone injection 0.5 mg (0.5 mg Intravenous Given 11/17/24 1811)   busPIRone tablet 15 mg (15 mg Oral Given 11/17/24 2052)   LORazepam tablet 0.5 mg (has no administration in time range)   metoprolol succinate (TOPROL-XL) 24 hr tablet 50 mg (has no administration in time range)   sertraline tablet 50 mg (has no administration in time range)   atorvastatin tablet 10 mg (has no administration in time range)   0.9%  NaCl infusion ( Intravenous New Bag 11/17/24 2052)   sodium chloride 0.9% flush 2 mL (has no administration in time range)   melatonin tablet 9 mg (has no administration in time range)   senna-docusate 8.6-50 mg per tablet 1 tablet (has no administration in time range)   HYDROcodone-acetaminophen 5-325 mg per tablet 1 tablet (1 tablet Oral Given 11/17/24 2022)   naloxone 0.4 mg/mL injection 0.02 mg (has no administration in time range)   potassium bicarbonate disintegrating tablet 50 mEq (has no administration in time range)   potassium bicarbonate disintegrating tablet 35 mEq (has no administration in time range)   potassium bicarbonate disintegrating tablet 60 mEq (has no administration in time range)   magnesium oxide tablet 800 mg (has no administration in time range)   magnesium oxide tablet 800 mg (has no administration in time range)   potassium, sodium phosphates 280-160-250 mg packet 2 packet (has no administration in time range)   potassium, sodium phosphates 280-160-250 mg packet 2 packet (has no administration in time range)   potassium, sodium phosphates 280-160-250 mg packet 2 packet (has no administration in time range)   metronidazole IVPB 500 mg (0 mg Intravenous Stopped 11/17/24 2152)    ciprofloxacin (CIPRO)400mg/200ml D5W IVPB 400 mg (400 mg Intravenous New Bag 11/17/24 2207)   famotidine tablet 20 mg (20 mg Oral Given 11/17/24 2052)   zolpidem tablet 10 mg (10 mg Oral Given 11/17/24 2213)   lactated ringers bolus 1,000 mL (0 mLs Intravenous Stopped 11/17/24 1447)   pantoprazole injection 80 mg (80 mg Intravenous Given 11/17/24 1337)   HYDROmorphone injection 0.5 mg (0.5 mg Intravenous Given 11/17/24 1343)   ondansetron injection 4 mg (4 mg Intravenous Given 11/17/24 1339)   iohexoL (OMNIPAQUE 350) injection 100 mL (100 mLs Intravenous Given 11/17/24 1445)     Medical Decision Making                                    Clinical Impression:  Final diagnoses:  [R10.9] Abdominal pain, unspecified abdominal location (Primary)  [K51.919] Ulcerative colitis with complication, unspecified location  [K62.5] Rectal bleeding  [K51.90] UC (ulcerative colitis)          ED Disposition Condition    Admit Stable                Mark Young MD  11/17/24 1996

## 2024-11-17 NOTE — HPI
49-year-old male with a past medical history of hypertension, non-insulin-dependent diabetes, obesity, hyperlipidemia, ulcerative colitis currently on Omvah , previous DVT on Xarelto who presents to the emergency room with reports of abdominal pain, rectal bleeding, bloody diarrhea for the last 2-3 days.  Patient sees Dr. Dr. Fraga [facility].  Denies chest pain, shortness breath, fever, generalized weakness.  Of note,He has history of recurrent C. Diff requiring fecal transplant in 2023, but C. Diff stool PCR has been repeatedly negative this year.   The ER, leukocytosis, WBCs on 11/15 was 13.7 now today 17.69, mild anemia with H&H 10.7, 34.3.  UA negative for infection, CT abdomen with evidence of active colitis.    Admit to hospital medicine for ulcerative colitis

## 2024-11-17 NOTE — ED NOTES
Reported abdominal pain and blood in stool.  Staes no continual bleeding.  LLQ abdominal pain reported. Alert and ambulatory. Falls precautions.  Call light in reach.

## 2024-11-17 NOTE — ASSESSMENT & PLAN NOTE
Currently on Omvah for treatment of UC, started in 4/2024, but patient reports progressively worsening symptoms despite compliance with med. He was on Remicade previously, but had to be stopped due to infectious complications.     C diff PCR  Giardia specimen collected and pending  Start  Zosyn IV, oral vancomycin  IV protonix  Continue IV methylprednisolone 30 mg BID  GI following    Per GI, Dr. Chanel  If C. Diff negative, can stop Vancomycin and continue steroids only. Ok to start clear liquid diet. Long term, patient will probably need change in biologic therapy.     Hold xarelto tonight

## 2024-11-17 NOTE — SUBJECTIVE & OBJECTIVE
Past Medical History:   Diagnosis Date    C. difficile colitis     Cavitary pneumonia 11/2023    pos aspergillus serology    Diabetes mellitus 01/2022    Hyperlipidemia 2015    Hypertension 2015    Insomnia 2015    Ulcerative colitis        Past Surgical History:   Procedure Laterality Date    BRONCHOSCOPY WITH FLUOROSCOPY Left 11/20/2023    Procedure: BRONCHOSCOPY, WITH FLUOROSCOPY;  Surgeon: Lisa Villarreal MD;  Location: [facility];  Service: Pulmonary;  Laterality: Left;    BRONCHOSCOPY WITH FLUOROSCOPY N/A 11/30/2023    Procedure: BRONCHOSCOPY, WITH FLUOROSCOPY;  Surgeon: Lisa Villarreal MD;  Location: [facility];  Service: Pulmonary;  Laterality: N/A;    CARDIAC ELECTROPHYSIOLOGY MAPPING AND ABLATION  2017    SVT    CHOLECYSTECTOMY  2011    COLONOSCOPY N/A 5/3/2022    Procedure: COLONOSCOPY;  Surgeon: Shan Jean III, MD;  Location: [facility];  Service: Endoscopy;  Laterality: N/A;    COLONOSCOPY N/A 3/16/2024    Procedure: COLONOSCOPY;  Surgeon: ALEKSANDER Ramos MD;  Location: [facility];  Service: Endoscopy;  Laterality: N/A;    COLONOSCOPY WITH FECAL MICROBIOTA TRANSFER N/A 3/21/2023    Procedure: COLONOSCOPY, WITH FECAL MICROBIOTA TRANSFER;  Surgeon: David Millan MD;  Location: [facility];  Service: Endoscopy;  Laterality: N/A;    ESOPHAGOGASTRODUODENOSCOPY N/A 4/24/2023    Procedure: EGD (ESOPHAGOGASTRODUODENOSCOPY);  Surgeon: Shan Jean III, MD;  Location: [facility];  Service: Endoscopy;  Laterality: N/A;    ESOPHAGOGASTRODUODENOSCOPY N/A 6/5/2024    Procedure: EGD (ESOPHAGOGASTRODUODENOSCOPY);  Surgeon: Odalis Mccabe MD;  Location: [facility];  Service: Endoscopy;  Laterality: N/A;    FLEXIBLE SIGMOIDOSCOPY N/A 6/5/2024    Procedure: SIGMOIDOSCOPY, FLEXIBLE;  Surgeon: Odalis Mccabe MD;  Location: [facility];  Service: Endoscopy;  Laterality: N/A;    FLEXIBLE SIGMOIDOSCOPY N/A 7/16/2024    Procedure: SIGMOIDOSCOPY, FLEXIBLE;  Surgeon: Shan Jean III, MD;   Location: [facility];  Service: Endoscopy;  Laterality: N/A;    FLEXIBLE SIGMOIDOSCOPY N/A 8/13/2024    Procedure: SIGMOIDOSCOPY, FLEXIBLE;  Surgeon: Shan Jean III, MD;  Location: [facility];  Service: Endoscopy;  Laterality: N/A;    GANGLION CYST EXCISION Left 1992    UMBILICAL HERNIA REPAIR  2011    with mesh       Review of patient's allergies indicates:  No Known Allergies    Current Facility-Administered Medications on File Prior to Encounter   Medication    lactated ringers infusion     Current Outpatient Medications on File Prior to Encounter   Medication Sig    busPIRone (BUSPAR) 15 MG tablet Take 1 tablet (15 mg total) by mouth 3 (three) times daily.    colchicine (COLCRYS) 0.6 mg tablet Take 1 tablet (0.6 mg total) by mouth 2 (two) times daily.    hyoscyamine (LEVBID) 0.375 mg Tb12 Take 0.375 mg by mouth 2 (two) times daily.    LORazepam (ATIVAN) 0.5 MG tablet Take 1 tablet (0.5 mg total) by mouth every 6 (six) hours as needed for Anxiety.    metoprolol succinate (TOPROL-XL) 50 MG 24 hr tablet Take 1 tablet (50 mg total) by mouth once daily.    pantoprazole (PROTONIX) 40 MG tablet Take 40 mg by mouth 2 (two) times daily.    predniSONE (DELTASONE) 10 MG tablet Take 4 tablets (40 mg total) by mouth once daily for 7 days, THEN 3 tablets (30 mg total) once daily for 7 days, THEN 2 tablets (20 mg total) once daily for 7 days, THEN 1 tablet (10 mg total) once daily for 7 days.    promethazine (PHENERGAN) 25 MG tablet Take 25 mg by mouth 4 (four) times daily as needed for Nausea.    rivaroxaban (XARELTO) 20 mg Tab Take 1 tablet (20 mg total) by mouth daily with dinner or evening meal.    sertraline (ZOLOFT) 100 MG tablet Take 1 tablet (100 mg total) by mouth once daily. (Patient taking differently: Take 100 mg by mouth once daily. Takes with 50 mg)    sertraline (ZOLOFT) 50 MG tablet Take 1 tablet (50 mg total) by mouth once daily. (Patient taking differently: Take 50 mg by mouth once daily. Takes with  100 mg)    simvastatin (ZOCOR) 20 MG tablet Take 1 tablet (20 mg total) by mouth every evening.    zolpidem (AMBIEN) 10 mg Tab Take 1 tablet (10 mg total) by mouth nightly as needed (insomnia).    ergocalciferol (VITAMIN D2) 50,000 unit Cap Take 1 capsule (50,000 Units total) by mouth every 7 days. (Patient not taking: Reported on 11/8/2024)    HYDROcodone-acetaminophen (NORCO) 5-325 mg per tablet Take 1 tablet by mouth every 6 (six) hours as needed for Pain. (Patient not taking: Reported on 11/17/2024)    [DISCONTINUED] budesonide (ENTOCORT EC) 3 mg capsule Take 9 mg by mouth once daily.    [DISCONTINUED] BUDESONIDE RECT Place 1 enema rectally 2 (two) times a day.    [DISCONTINUED] cholestyramine (QUESTRAN) 4 gram packet Take 1 packet by mouth 3 (three) times daily. (Patient not taking: Reported on 11/8/2024)     Family History       Problem Relation (Age of Onset)    Asthma Mother          Tobacco Use    Smoking status: Former     Average packs/day: 1 pack/day for 30.0 years (30.0 ttl pk-yrs)     Types: Cigarettes     Start date: 1993    Smokeless tobacco: Never    Tobacco comments:     Pt states he has stopped smoking with Chantix three weeks ago. 12/1/23   Substance and Sexual Activity    Alcohol use: Yes     Comment: ocass    Drug use: Not Currently    Sexual activity: Not Currently     Review of Systems   Constitutional: Negative.    HENT: Negative.     Respiratory: Negative.     Cardiovascular: Negative.    Gastrointestinal:  Positive for abdominal pain, blood in stool and diarrhea.   Genitourinary: Negative.    Musculoskeletal: Negative.    Psychiatric/Behavioral: Negative.       Objective:     Vital Signs (Most Recent):  Temp: 98.5 °F (36.9 °C) (11/17/24 1107)  Pulse: 79 (11/17/24 1730)  Resp: 20 (11/17/24 1730)  BP: 139/74 (11/17/24 1730)  SpO2: 97 % (11/17/24 1730) Vital Signs (24h Range):  Temp:  [98.5 °F (36.9 °C)] 98.5 °F (36.9 °C)  Pulse:  [] 79  Resp:  [16-24] 20  SpO2:  [95 %-100 %] 97 %  BP:  "(125-148)/(70-87) 139/74     Weight: 129.3 kg (285 lb)  Body mass index is 47.43 kg/m².     Physical Exam  Vitals reviewed.   Constitutional:       General: He is not in acute distress.     Appearance: Normal appearance. He is not ill-appearing.   HENT:      Head: Normocephalic and atraumatic.      Mouth/Throat:      Mouth: Mucous membranes are moist.   Eyes:      Extraocular Movements: Extraocular movements intact.   Cardiovascular:      Rate and Rhythm: Normal rate and regular rhythm.      Heart sounds: Murmur heard.   Pulmonary:      Effort: Pulmonary effort is normal. No respiratory distress.      Breath sounds: Normal breath sounds.   Abdominal:      General: Bowel sounds are normal. There is no distension.      Palpations: Abdomen is soft.      Tenderness: There is no abdominal tenderness.   Musculoskeletal:         General: No swelling. Normal range of motion.      Cervical back: Neck supple.   Skin:     General: Skin is warm and dry.      Capillary Refill: Capillary refill takes less than 2 seconds.   Neurological:      General: No focal deficit present.      Mental Status: He is alert. Mental status is at baseline.   Psychiatric:         Mood and Affect: Mood normal.                Significant Labs: All pertinent labs within the past 24 hours have been reviewed.  Bilirubin:   Recent Labs   Lab 11/01/24  2204 11/02/24  0456 11/03/24  0903 11/04/24  0434 11/17/24  1159   BILITOT 0.3 0.4 0.5 0.4 0.4     CBC:   Recent Labs   Lab 11/17/24  1159   WBC 17.69*   HGB 10.7*   HCT 34.3*        CMP:   Recent Labs   Lab 11/17/24  1159      K 3.8      CO2 28   *   BUN 12   CREATININE 1.1   CALCIUM 9.0   PROT 6.9   ALBUMIN 3.8   BILITOT 0.4   ALKPHOS 68   AST 11   ALT 8*   ANIONGAP 6*     Coagulation: No results for input(s): "PT", "INR", "APTT" in the last 48 hours.  Lactic Acid: No results for input(s): "LACTATE" in the last 48 hours.  Lipid Panel: No results for input(s): "CHOL", "HDL", " ""LDLCALC", "TRIG", "CHOLHDL" in the last 48 hours.  Magnesium: No results for input(s): "MG" in the last 48 hours.  POCT Glucose: No results for input(s): "POCTGLUCOSE" in the last 48 hours.  TSH:   Recent Labs   Lab 11/01/24  2204   TSH 0.515     Urine Studies:   Recent Labs   Lab 11/17/24  1325   COLORU Yellow   APPEARANCEUA Clear   PHUR 6.0   SPECGRAV 1.020   PROTEINUA Negative   GLUCUA Negative   KETONESU Negative   BILIRUBINUA Negative   OCCULTUA 1+*   NITRITE Negative   UROBILINOGEN Negative   LEUKOCYTESUR Negative   RBCUA 4   WBCUA 2   SQUAMEPITHEL 0       Significant Imaging: I have reviewed all pertinent imaging results/findings within the past 24 hours.  Imaging Results              CT Abdomen Pelvis With IV Contrast NO Oral Contrast (Final result)  Result time 11/17/24 15:08:38      Final result by Rickie Brown MD (11/17/24 15:08:38)                   Impression:      Wall thickening and pericolonic inflammatory changes involving the descending and proximal sigmoid colon consistent with infectious/inflammatory colitis.  Findings are similar to previous CT exams from earlier this month and September of this year.  Consider follow-up with GI/colonoscopy.    Nonobstructing nephrolithiasis.    Borderline hepatic steatosis.      Electronically signed by: Rickie Brown  Date:    11/17/2024  Time:    15:08               Narrative:    EXAMINATION:  CT ABDOMEN PELVIS WITH IV CONTRAST    CLINICAL HISTORY:  Abdominal pain;    TECHNIQUE:  Axial CT imaging obtained through the abdomen and pelvis after 100 mL Omnipaque 350.  Coronal and sagittal reformatted images.    CMS Mandated Quality Data-CT Radiation Dose-436    All CT scans at this facility dose modulation, iterative reconstruction, and or weight-based dosing when appropriate to reduce radiation dose to as low as reasonably achievable.    COMPARISON:  CT abdomen and pelvis 11/01/2024 and 09/28/2024 previous exams.    FINDINGS:  Lung bases are clear.  Bone " window images show no acute or aggressive osseous abnormality.  Degenerative changes of the spine and hips.    Borderline hepatic steatosis.  No focal hepatic lesion.  Gallbladder is absent.  No intrahepatic or extrahepatic bile duct dilation.  Portal vein is patent.  Spleen is unremarkable.  Small left upper quadrant splenule.  Pancreas is within normal limits.  Duodenal diverticulum noted.  No adrenal lesion.  Tiny nonobstructing right renal calculi.  Numerous nonobstructing left renal calculi ranging in size from 3 mm to 6 mm.  Ureters are normal in caliber.  Urinary bladder shows no focal lesion.    Stomach is unremarkable.  No evidence of small-bowel obstruction.  Normal appendix.  Proximal colon is unremarkable.  Lack of haustration involving the distal transverse colon.  Descending colon shows wall thickening with pericolonic inflammatory stranding and prominent vascularity which is similar to previous exams.    No free fluid, free air, or abscess in the abdomen or pelvis.  No lymphadenopathy.

## 2024-11-17 NOTE — CONSULTS
Consult Note  Gastroenterology/Hepatology    Consult Requested By: ER  Reason for Consult: ulcerative colitis    SUBJECTIVE:     History of Present Illness: This is a very pleasant 48yo M with ulcerative colitis, hx of recurrent C. Diff, HTN, HLD, who presents to the ED with complaints of abd pain, bloody diarrhea.  He is currently on Omvah- he has been on it since 4/2024- but has had progressively worsening symptoms related to his UC.  This is his 2nd ER visit this month.  He has history of recurrent C. Diff requiring fecal transplant in 2023, but C. Diff stool PCR has been repeatedly negative this year.  CT this admission showed evidence of active colitis. Labs significant for elevated WBC of 17K.     Review of patient's allergies indicates:  No Known Allergies    Past Medical History:   Diagnosis Date    C. difficile colitis     Cavitary pneumonia 11/2023    pos aspergillus serology    Diabetes mellitus 01/2022    Hyperlipidemia 2015    Hypertension 2015    Insomnia 2015    Ulcerative colitis      Past Surgical History:   Procedure Laterality Date    BRONCHOSCOPY WITH FLUOROSCOPY Left 11/20/2023    Procedure: BRONCHOSCOPY, WITH FLUOROSCOPY;  Surgeon: Lisa Villarreal MD;  Location: [facility];  Service: Pulmonary;  Laterality: Left;    BRONCHOSCOPY WITH FLUOROSCOPY N/A 11/30/2023    Procedure: BRONCHOSCOPY, WITH FLUOROSCOPY;  Surgeon: Lisa Villarreal MD;  Location: [facility];  Service: Pulmonary;  Laterality: N/A;    CARDIAC ELECTROPHYSIOLOGY MAPPING AND ABLATION  2017    SVT    CHOLECYSTECTOMY  2011    COLONOSCOPY N/A 5/3/2022    Procedure: COLONOSCOPY;  Surgeon: Shan Jean III, MD;  Location: [facility];  Service: Endoscopy;  Laterality: N/A;    COLONOSCOPY N/A 3/16/2024    Procedure: COLONOSCOPY;  Surgeon: ALEKSANDER Ramos MD;  Location: [facility];  Service: Endoscopy;  Laterality: N/A;    COLONOSCOPY WITH FECAL MICROBIOTA TRANSFER N/A 3/21/2023    Procedure: COLONOSCOPY, WITH FECAL MICROBIOTA  TRANSFER;  Surgeon: David Millan MD;  Location: [facility];  Service: Endoscopy;  Laterality: N/A;    ESOPHAGOGASTRODUODENOSCOPY N/A 4/24/2023    Procedure: EGD (ESOPHAGOGASTRODUODENOSCOPY);  Surgeon: Shan Jean III, MD;  Location: [facility];  Service: Endoscopy;  Laterality: N/A;    ESOPHAGOGASTRODUODENOSCOPY N/A 6/5/2024    Procedure: EGD (ESOPHAGOGASTRODUODENOSCOPY);  Surgeon: Odalis Mccabe MD;  Location: [facility];  Service: Endoscopy;  Laterality: N/A;    FLEXIBLE SIGMOIDOSCOPY N/A 6/5/2024    Procedure: SIGMOIDOSCOPY, FLEXIBLE;  Surgeon: Odalis Mccabe MD;  Location: [facility];  Service: Endoscopy;  Laterality: N/A;    FLEXIBLE SIGMOIDOSCOPY N/A 7/16/2024    Procedure: SIGMOIDOSCOPY, FLEXIBLE;  Surgeon: Shan Jean III, MD;  Location: [facility];  Service: Endoscopy;  Laterality: N/A;    FLEXIBLE SIGMOIDOSCOPY N/A 8/13/2024    Procedure: SIGMOIDOSCOPY, FLEXIBLE;  Surgeon: Shan Jean III, MD;  Location: [facility];  Service: Endoscopy;  Laterality: N/A;    GANGLION CYST EXCISION Left 1992    UMBILICAL HERNIA REPAIR  2011    with mesh     Family History   Problem Relation Name Age of Onset    Asthma Mother       Social History     Tobacco Use    Smoking status: Former     Average packs/day: 1 pack/day for 30.0 years (30.0 ttl pk-yrs)     Types: Cigarettes     Start date: 1993    Smokeless tobacco: Never    Tobacco comments:     Pt states he has stopped smoking with Chantix three weeks ago. 12/1/23   Substance Use Topics    Alcohol use: Yes     Comment: ocass    Drug use: Not Currently       Review of Systems:  ROS negative except as stated above in HPI    OBJECTIVE:     Vital Signs:  Temp:  [98.5 °F (36.9 °C)]   Pulse:  []   Resp:  [16-24]   BP: (125-148)/(72-83)   SpO2:  [95 %-100 %]     Physical Exam:  General: well developed, well nourished  Eyes: conjunctivae/corneas clear. PERRL..  HENT: Head:normocephalic, atraumatic. Ears:hearing grossly normal bilaterally.  "Nose: Nares normal. Septum midline. Mucosa normal. No drainage or sinus tenderness., no discharge. Throat: lips, mucosa, and tongue normal; teeth and gums normal and no throat erythema.  Neck: supple, symmetrical, trachea midline, no JVD and thyroid not enlarged, symmetric, no tenderness/mass/nodules  Lungs:  clear to auscultation bilaterally and normal respiratory effort  Cardiovascular: Heart: regular rate and rhythm, S1, S2 normal, no murmur, click, rub or gallop. Chest Wall: no tenderness. Extremities: no cyanosis or edema, or clubbing. Pulses: 2+ and symmetric.  Abdomen/Rectal: Abdomen: soft, non-tender non-distented; bowel sounds normal; no masses,  no organomegaly. Rectal: not examined  Skin: Skin color, texture, turgor normal. No rashes or lesions  Musculoskeletal:normal gait and no clubbing, cyanosis  Lymph Nodes: No cervical or supraclavicular adenopathy  Neurologic: Normal strength and tone. No focal numbness or weakness  Psych/Behavioral:  Normal. and Alert and oriented, appropriate affect.      Laboratory and Radiology Data:  CBC:   Recent Labs   Lab 11/17/24  1159   WBC 17.69*   RBC 3.77*   HGB 10.7*   HCT 34.3*      MCV 91   MCH 28.4   MCHC 31.2*     BMP:   Recent Labs   Lab 11/17/24  1159   *      K 3.8      CO2 28   BUN 12   CREATININE 1.1   CALCIUM 9.0     CMP:   Recent Labs   Lab 11/17/24  1159   *   CALCIUM 9.0   ALBUMIN 3.8   PROT 6.9      K 3.8   CO2 28      BUN 12   CREATININE 1.1   ALKPHOS 68   ALT 8*   AST 11   BILITOT 0.4     LFTs:   Recent Labs   Lab 11/17/24  1159   ALT 8*   AST 11   ALKPHOS 68   BILITOT 0.4   PROT 6.9   ALBUMIN 3.8     Coagulation: No results for input(s): "LABPROT", "INR", "APTT" in the last 168 hours.      ASSESSMENT/PLAN:   This is a very pleasant 48yo M with Ulcerative colitis, hx of recurrent C. Diff, who presents to ED with symptoms of worsening abd pain, bloody diarrhea.  CT shows evidence of active colitis.  WBC " elevated at 17K.  Currently on Omvah for treatment of UC, started in 4/2024, but patient reports progressively worsening symptoms despite compliance with med.  He was on Remicade previously, but had to be stopped due to infectious complications.        Plan:  Ulcerative colitis- current symptoms likely secondary to UC flare, although cannot rule out C. Diff infection given his prior history.  Will start IV Solumedrol 30mg BID empirically.  Will start oral Vancomycin 125mg QID empirically.  WIll check C. Diff PCR, fecal calpro.  If C. Diff negative, can stop Vancomycin and continue steroids only.  Ok to start clear liquid diet.  Long term, patient will probably need change in biologic therapy.  WIll discuss with Dr. Jean- there was recent discussion about switching him to Tremfya.        Thank you very much for the consult.  I will continue to follow.  Please do not hesitate to contact me with any questions or concerns.    Juan Carlos Chanel MD

## 2024-11-18 LAB
ALBUMIN SERPL BCP-MCNC: 3.5 G/DL (ref 3.5–5.2)
ALP SERPL-CCNC: 64 U/L (ref 55–135)
ALT SERPL W/O P-5'-P-CCNC: 7 U/L (ref 10–44)
ANION GAP SERPL CALC-SCNC: 6 MMOL/L (ref 8–16)
AST SERPL-CCNC: 9 U/L (ref 10–40)
BASOPHILS # BLD AUTO: 0.02 K/UL (ref 0–0.2)
BASOPHILS NFR BLD: 0.2 % (ref 0–1.9)
BILIRUB SERPL-MCNC: 0.4 MG/DL (ref 0.1–1)
BUN SERPL-MCNC: 10 MG/DL (ref 6–20)
CALCIUM SERPL-MCNC: 8.8 MG/DL (ref 8.7–10.5)
CHLORIDE SERPL-SCNC: 103 MMOL/L (ref 95–110)
CO2 SERPL-SCNC: 26 MMOL/L (ref 23–29)
CREAT SERPL-MCNC: 1 MG/DL (ref 0.5–1.4)
DIFFERENTIAL METHOD BLD: ABNORMAL
EOSINOPHIL # BLD AUTO: 0 K/UL (ref 0–0.5)
EOSINOPHIL NFR BLD: 0.1 % (ref 0–8)
ERYTHROCYTE [DISTWIDTH] IN BLOOD BY AUTOMATED COUNT: 14.6 % (ref 11.5–14.5)
EST. GFR  (NO RACE VARIABLE): >60 ML/MIN/1.73 M^2
FOLATE SERPL-MCNC: 5.9 NG/ML (ref 4–24)
GLUCOSE SERPL-MCNC: 124 MG/DL (ref 70–110)
HCT VFR BLD AUTO: 30.6 % (ref 40–54)
HGB BLD-MCNC: 9.3 G/DL (ref 14–18)
IMM GRANULOCYTES # BLD AUTO: 0.05 K/UL (ref 0–0.04)
IMM GRANULOCYTES NFR BLD AUTO: 0.5 % (ref 0–0.5)
IRON SERPL-MCNC: 15 UG/DL (ref 45–160)
LYMPHOCYTES # BLD AUTO: 1 K/UL (ref 1–4.8)
LYMPHOCYTES NFR BLD: 9.6 % (ref 18–48)
MCH RBC QN AUTO: 27.4 PG (ref 27–31)
MCHC RBC AUTO-ENTMCNC: 30.4 G/DL (ref 32–36)
MCV RBC AUTO: 90 FL (ref 82–98)
MONOCYTES # BLD AUTO: 0.5 K/UL (ref 0.3–1)
MONOCYTES NFR BLD: 4.7 % (ref 4–15)
NEUTROPHILS # BLD AUTO: 9.1 K/UL (ref 1.8–7.7)
NEUTROPHILS NFR BLD: 84.9 % (ref 38–73)
NRBC BLD-RTO: 0 /100 WBC
PLATELET # BLD AUTO: 285 K/UL (ref 150–450)
PMV BLD AUTO: 10.6 FL (ref 9.2–12.9)
POTASSIUM SERPL-SCNC: 4.3 MMOL/L (ref 3.5–5.1)
PROT SERPL-MCNC: 6.4 G/DL (ref 6–8.4)
RBC # BLD AUTO: 3.39 M/UL (ref 4.6–6.2)
SATURATED IRON: 3 % (ref 20–50)
SODIUM SERPL-SCNC: 135 MMOL/L (ref 136–145)
TOTAL IRON BINDING CAPACITY: 442 UG/DL (ref 250–450)
TRANSFERRIN SERPL-MCNC: 316 MG/DL (ref 200–375)
VIT B12 SERPL-MCNC: 224 PG/ML (ref 210–950)
WBC # BLD AUTO: 10.67 K/UL (ref 3.9–12.7)

## 2024-11-18 PROCEDURE — 63600175 PHARM REV CODE 636 W HCPCS: Performed by: NURSE PRACTITIONER

## 2024-11-18 PROCEDURE — 25000003 PHARM REV CODE 250: Performed by: INTERNAL MEDICINE

## 2024-11-18 PROCEDURE — 80053 COMPREHEN METABOLIC PANEL: CPT | Performed by: NURSE PRACTITIONER

## 2024-11-18 PROCEDURE — 83540 ASSAY OF IRON: CPT | Performed by: NURSE PRACTITIONER

## 2024-11-18 PROCEDURE — 25000003 PHARM REV CODE 250: Performed by: NURSE PRACTITIONER

## 2024-11-18 PROCEDURE — 99221 1ST HOSP IP/OBS SF/LOW 40: CPT | Mod: ,,, | Performed by: FAMILY MEDICINE

## 2024-11-18 PROCEDURE — 25000003 PHARM REV CODE 250: Performed by: HOSPITALIST

## 2024-11-18 PROCEDURE — 82607 VITAMIN B-12: CPT | Performed by: NURSE PRACTITIONER

## 2024-11-18 PROCEDURE — 36415 COLL VENOUS BLD VENIPUNCTURE: CPT | Performed by: NURSE PRACTITIONER

## 2024-11-18 PROCEDURE — 82746 ASSAY OF FOLIC ACID SERUM: CPT | Performed by: NURSE PRACTITIONER

## 2024-11-18 PROCEDURE — 63600175 PHARM REV CODE 636 W HCPCS: Performed by: INTERNAL MEDICINE

## 2024-11-18 PROCEDURE — 63600175 PHARM REV CODE 636 W HCPCS: Performed by: HOSPITALIST

## 2024-11-18 PROCEDURE — 63600175 PHARM REV CODE 636 W HCPCS: Mod: JZ,JG | Performed by: INTERNAL MEDICINE

## 2024-11-18 PROCEDURE — 85025 COMPLETE CBC W/AUTO DIFF WBC: CPT | Performed by: NURSE PRACTITIONER

## 2024-11-18 PROCEDURE — 21400001 HC TELEMETRY ROOM

## 2024-11-18 RX ORDER — SERTRALINE HYDROCHLORIDE 50 MG/1
150 TABLET, FILM COATED ORAL DAILY
Status: DISCONTINUED | OUTPATIENT
Start: 2024-11-19 | End: 2024-11-18

## 2024-11-18 RX ORDER — CYANOCOBALAMIN 1000 UG/ML
1000 INJECTION, SOLUTION INTRAMUSCULAR; SUBCUTANEOUS DAILY
Status: COMPLETED | OUTPATIENT
Start: 2024-11-18 | End: 2024-11-20

## 2024-11-18 RX ORDER — SERTRALINE HYDROCHLORIDE 50 MG/1
150 TABLET, FILM COATED ORAL DAILY
Status: DISCONTINUED | OUTPATIENT
Start: 2024-11-18 | End: 2024-11-21 | Stop reason: HOSPADM

## 2024-11-18 RX ADMIN — HYDROCODONE BITARTRATE AND ACETAMINOPHEN 1 TABLET: 5; 325 TABLET ORAL at 08:11

## 2024-11-18 RX ADMIN — BUSPIRONE HYDROCHLORIDE 15 MG: 5 TABLET ORAL at 03:11

## 2024-11-18 RX ADMIN — HYDROCODONE BITARTRATE AND ACETAMINOPHEN 1 TABLET: 5; 325 TABLET ORAL at 02:11

## 2024-11-18 RX ADMIN — SODIUM CHLORIDE: 9 INJECTION, SOLUTION INTRAVENOUS at 03:11

## 2024-11-18 RX ADMIN — ZOLPIDEM TARTRATE 10 MG: 5 TABLET, COATED ORAL at 09:11

## 2024-11-18 RX ADMIN — HYDROMORPHONE HYDROCHLORIDE 0.5 MG: 1 INJECTION, SOLUTION INTRAMUSCULAR; INTRAVENOUS; SUBCUTANEOUS at 12:11

## 2024-11-18 RX ADMIN — HYDROMORPHONE HYDROCHLORIDE 0.5 MG: 1 INJECTION, SOLUTION INTRAMUSCULAR; INTRAVENOUS; SUBCUTANEOUS at 10:11

## 2024-11-18 RX ADMIN — METHYLPREDNISOLONE SODIUM SUCCINATE 30 MG: 40 INJECTION, POWDER, FOR SOLUTION INTRAMUSCULAR; INTRAVENOUS at 09:11

## 2024-11-18 RX ADMIN — HYDROCODONE BITARTRATE AND ACETAMINOPHEN 1 TABLET: 5; 325 TABLET ORAL at 03:11

## 2024-11-18 RX ADMIN — FAMOTIDINE 20 MG: 20 TABLET ORAL at 08:11

## 2024-11-18 RX ADMIN — SERTRALINE HYDROCHLORIDE 150 MG: 50 TABLET ORAL at 10:11

## 2024-11-18 RX ADMIN — FAMOTIDINE 20 MG: 20 TABLET ORAL at 09:11

## 2024-11-18 RX ADMIN — BUSPIRONE HYDROCHLORIDE 15 MG: 5 TABLET ORAL at 08:11

## 2024-11-18 RX ADMIN — HYDROCODONE BITARTRATE AND ACETAMINOPHEN 1 TABLET: 5; 325 TABLET ORAL at 09:11

## 2024-11-18 RX ADMIN — CIPROFLOXACIN 400 MG: 2 INJECTION, SOLUTION INTRAVENOUS at 10:11

## 2024-11-18 RX ADMIN — ONDANSETRON 4 MG: 2 INJECTION INTRAMUSCULAR; INTRAVENOUS at 07:11

## 2024-11-18 RX ADMIN — BUSPIRONE HYDROCHLORIDE 15 MG: 5 TABLET ORAL at 09:11

## 2024-11-18 RX ADMIN — METRONIDAZOLE 500 MG: 500 INJECTION, SOLUTION INTRAVENOUS at 12:11

## 2024-11-18 RX ADMIN — ONDANSETRON 4 MG: 2 INJECTION INTRAMUSCULAR; INTRAVENOUS at 09:11

## 2024-11-18 RX ADMIN — CYANOCOBALAMIN 1000 MCG: 1000 INJECTION INTRAMUSCULAR; SUBCUTANEOUS at 03:11

## 2024-11-18 RX ADMIN — ATORVASTATIN CALCIUM 10 MG: 10 TABLET, FILM COATED ORAL at 08:11

## 2024-11-18 RX ADMIN — HYDROMORPHONE HYDROCHLORIDE 0.5 MG: 1 INJECTION, SOLUTION INTRAMUSCULAR; INTRAVENOUS; SUBCUTANEOUS at 04:11

## 2024-11-18 RX ADMIN — METRONIDAZOLE 500 MG: 500 INJECTION, SOLUTION INTRAVENOUS at 09:11

## 2024-11-18 RX ADMIN — RIVAROXABAN 20 MG: 10 TABLET, FILM COATED ORAL at 04:11

## 2024-11-18 RX ADMIN — SODIUM CHLORIDE 125 MG: 9 INJECTION, SOLUTION INTRAVENOUS at 03:11

## 2024-11-18 RX ADMIN — METOPROLOL SUCCINATE 50 MG: 50 TABLET, FILM COATED, EXTENDED RELEASE ORAL at 08:11

## 2024-11-18 RX ADMIN — CIPROFLOXACIN 400 MG: 2 INJECTION, SOLUTION INTRAVENOUS at 09:11

## 2024-11-18 RX ADMIN — METRONIDAZOLE 500 MG: 500 INJECTION, SOLUTION INTRAVENOUS at 04:11

## 2024-11-18 RX ADMIN — HYDROMORPHONE HYDROCHLORIDE 0.5 MG: 1 INJECTION, SOLUTION INTRAMUSCULAR; INTRAVENOUS; SUBCUTANEOUS at 06:11

## 2024-11-18 NOTE — PROGRESS NOTES
Critical access hospital Medicine  Progress Note    Patient Name: Jacoby Jean-Baptiste  MRN: [id number]  Patient Class: IP- Inpatient   Admission Date: 11/17/2024  Length of Stay: 1 days  Attending Physician: West Oshea MD  Primary Care Provider: Hunter Aguilar III, MD        Subjective:     Principal Problem:Ulcerative colitis        HPI:  49-year-old male with a past medical history of hypertension, non-insulin-dependent diabetes, obesity, hyperlipidemia, ulcerative colitis currently on Omvah , previous DVT on Xarelto who presents to the emergency room with reports of abdominal pain, rectal bleeding, bloody diarrhea for the last 2-3 days.  Patient sees Dr. Dr. [facility].  Denies chest pain, shortness breath, fever, generalized weakness.  Of note,He has history of recurrent C. Diff requiring fecal transplant in 2023, but C. Diff stool PCR has been repeatedly negative this year.   The ER, leukocytosis, WBCs on 11/15 was 13.7 now today 17.69, mild anemia with H&H 10.7, 34.3.  UA negative for infection, CT abdomen with evidence of active colitis.    Admit to hospital medicine for ulcerative colitis    Overview/Hospital Course:  No notes on file    Interval History:   Seen and examined at multidisciplinary rounds, diarrhea improved, no fever or chills, no recent sick contact.  C diff negative.     Review of Systems   Gastrointestinal:  Positive for abdominal pain, blood in stool, diarrhea and vomiting.     Objective:     Vital Signs (Most Recent):  Temp: 97.9 °F (36.6 °C) (11/18/24 1100)  Pulse: 72 (11/18/24 1100)  Resp: 18 (11/18/24 1256)  BP: (!) 144/76 (11/18/24 1100)  SpO2: 99 % (11/18/24 1100) Vital Signs (24h Range):  Temp:  [97.8 °F (36.6 °C)-98.3 °F (36.8 °C)] 97.9 °F (36.6 °C)  Pulse:  [68-86] 72  Resp:  [15-22] 18  SpO2:  [93 %-99 %] 99 %  BP: (119-144)/(70-87) 144/76     Weight: 127.4 kg (280 lb 14.4 oz)  Body mass index is 46.74 kg/m².    Intake/Output Summary (Last 24  hours) at 11/18/2024 1332  Last data filed at 11/18/2024 1243  Gross per 24 hour   Intake 2420 ml   Output --   Net 2420 ml         Physical Exam  Vitals and nursing note reviewed.   Constitutional:       Appearance: He is obese.   Eyes:      Pupils: Pupils are equal, round, and reactive to light.   Neck:      Vascular: No JVD.   Cardiovascular:      Rate and Rhythm: Normal rate and regular rhythm.      Heart sounds: Normal heart sounds.   Pulmonary:      Effort: Pulmonary effort is normal.      Breath sounds: Normal breath sounds.   Abdominal:      General: Bowel sounds are normal. There is no distension.      Palpations: Abdomen is soft.      Tenderness: There is no abdominal tenderness.   Musculoskeletal:         General: No deformity.   Lymphadenopathy:      Cervical: No cervical adenopathy.   Skin:     Coloration: Skin is not pale.      Findings: No rash.             Significant Labs: All pertinent labs within the past 24 hours have been reviewed.  CBC:   Recent Labs   Lab 11/17/24  1159 11/18/24  0409   WBC 17.69* 10.67   HGB 10.7* 9.3*   HCT 34.3* 30.6*    285     CMP:   Recent Labs   Lab 11/17/24  1159 11/18/24  0409    135*   K 3.8 4.3    103   CO2 28 26   * 124*   BUN 12 10   CREATININE 1.1 1.0   CALCIUM 9.0 8.8   PROT 6.9 6.4   ALBUMIN 3.8 3.5   BILITOT 0.4 0.4   ALKPHOS 68 64   AST 11 9*   ALT 8* 7*   ANIONGAP 6* 6*       Significant Imaging: I have reviewed all pertinent imaging results/findings within the past 24 hours.  I have reviewed and interpreted all pertinent imaging results/findings within the past 24 hours.    Scheduled Meds:   atorvastatin  10 mg Oral Daily    busPIRone  15 mg Oral TID    ciprofloxacin  400 mg Intravenous Q12H    famotidine  20 mg Oral BID    methylPREDNISolone injection (PEDS and ADULTS)  30 mg Intravenous BID    metoprolol succinate  50 mg Oral Daily    metroNIDAZOLE IV (PEDS and ADULTS)  500 mg Intravenous Q8H    sertraline  150 mg Oral Daily      Continuous Infusions:   0.9% NaCl   Intravenous Continuous 75 mL/hr at 11/17/24 2052 New Bag at 11/17/24 2052     PRN Meds:.  Current Facility-Administered Medications:     acetaminophen, 650 mg, Oral, Q4H PRN    HYDROcodone-acetaminophen, 1 tablet, Oral, Q6H PRN    HYDROmorphone, 0.5 mg, Intravenous, Q4H PRN    LORazepam, 0.5 mg, Oral, Q6H PRN    magnesium oxide, 800 mg, Oral, PRN    magnesium oxide, 800 mg, Oral, PRN    melatonin, 9 mg, Oral, Nightly PRN    naloxone, 0.02 mg, Intravenous, PRN    ondansetron, 4 mg, Intravenous, Q6H PRN    potassium bicarbonate, 35 mEq, Oral, PRN    potassium bicarbonate, 50 mEq, Oral, PRN    potassium bicarbonate, 60 mEq, Oral, PRN    potassium, sodium phosphates, 2 packet, Oral, PRN    potassium, sodium phosphates, 2 packet, Oral, PRN    potassium, sodium phosphates, 2 packet, Oral, PRN    senna-docusate 8.6-50 mg, 1 tablet, Oral, BID PRN    sodium chloride 0.9%, 10 mL, Intravenous, PRN    sodium chloride 0.9%, 2 mL, Intravenous, Q12H PRN    zolpidem, 10 mg, Oral, Nightly PRN    CT Abdomen Pelvis With IV Contrast NO Oral Contrast    Result Date: 11/17/2024  EXAMINATION: CT ABDOMEN PELVIS WITH IV CONTRAST CLINICAL HISTORY: Abdominal pain; TECHNIQUE: Axial CT imaging obtained through the abdomen and pelvis after 100 mL Omnipaque 350.  Coronal and sagittal reformatted images. CMS Mandated Quality Data-CT Radiation Dose-436 All CT scans at this facility dose modulation, iterative reconstruction, and or weight-based dosing when appropriate to reduce radiation dose to as low as reasonably achievable. COMPARISON: CT abdomen and pelvis 11/01/2024 and 09/28/2024 previous exams. FINDINGS: Lung bases are clear.  Bone window images show no acute or aggressive osseous abnormality.  Degenerative changes of the spine and hips. Borderline hepatic steatosis.  No focal hepatic lesion.  Gallbladder is absent.  No intrahepatic or extrahepatic bile duct dilation.  Portal vein is patent.  Spleen is  unremarkable.  Small left upper quadrant splenule.  Pancreas is within normal limits.  Duodenal diverticulum noted.  No adrenal lesion.  Tiny nonobstructing right renal calculi.  Numerous nonobstructing left renal calculi ranging in size from 3 mm to 6 mm.  Ureters are normal in caliber.  Urinary bladder shows no focal lesion. Stomach is unremarkable.  No evidence of small-bowel obstruction.  Normal appendix.  Proximal colon is unremarkable.  Lack of haustration involving the distal transverse colon.  Descending colon shows wall thickening with pericolonic inflammatory stranding and prominent vascularity which is similar to previous exams. No free fluid, free air, or abscess in the abdomen or pelvis.  No lymphadenopathy.     Wall thickening and pericolonic inflammatory changes involving the descending and proximal sigmoid colon consistent with infectious/inflammatory colitis.  Findings are similar to previous CT exams from earlier this month and September of this year.  Consider follow-up with GI/colonoscopy. Nonobstructing nephrolithiasis. Borderline hepatic steatosis. Electronically signed by: Rickie Brown Date:    11/17/2024 Time:    15:08    US Upper Extremity Veins Right    Result Date: 11/2/2024  EXAMINATION: US UPPER EXTREMITY VEINS RIGHT CLINICAL HISTORY: deep vein thrombosis; TECHNIQUE: Duplex and color flow Doppler evaluation and dynamic compression was performed of the right upper extremity veins. COMPARISON: 10/08/2024 FINDINGS: Central veins: The internal jugular, subclavian, and axillary veins are patent and free of thrombus. Arm veins: Previously noted thrombus within the basilic vein is no longer definitively visualized.  The brachial vein appears patent.  There is a venous catheter within the cephalic vein. Other findings: None.     No thrombus in the central veins of the right upper extremity.  Previously noted thrombus within the right basilic vein is no longer definitively visualized.  Electronically signed by: Kirk Dooley MD Date:    11/02/2024 Time:    17:54    MRI Brain Without Contrast    Result Date: 11/1/2024  EXAMINATION: MRI BRAIN WITHOUT CONTRAST CLINICAL HISTORY: Dizziness, persistent/recurrent, cardiac or vascular cause suspected;Acute focal neurological deficit;. TECHNIQUE: Multiplanar multisequence MR imaging of the brain was performed without contrast. COMPARISON: CTA head neck 11/01/2024. FINDINGS: Intracranial compartment: Ventricles and sulci are normal in size for age without evidence of hydrocephalus. No extra-axial blood or fluid collections. The brain parenchyma appears normal. No mass lesion, acute hemorrhage, edema or acute infarct. Normal vascular flow voids are preserved. Skull/extracranial contents (limited evaluation): Bone marrow signal intensity is normal.     No acute abnormality. Electronically signed by: Russ Medina MD Date:    11/01/2024 Time:    23:38    CTA Head and Neck (xpd)    Result Date: 11/1/2024  EXAMINATION: CTA HEAD AND NECK (XPD) CLINICAL HISTORY: Dizziness, persistent/recurrent, cardiac or vascular cause suspected; TECHNIQUE: CT angiogram was performed from the level of the greg to the top of the head following the IV administration of 100mL of Omnipaque 350.   Sagittal and coronal reconstructions and maximum intensity projection reconstructions were performed. Arterial stenosis percentages are based on NASCET measurement criteria. COMPARISON: CTA head 11/19/2023, CT head without contrast 11/01/2024 FINDINGS: Please note evaluation is limited secondary to poor opacification of the arterial vasculature limiting assessment. Aorta: Normal 3 vessel arch. Extracranial carotid circulation: Bilateral common carotid arteries are patent.  There is atherosclerotic calcification of the carotid bifurcations, right greater than left, without evidence of a hemodynamically significant stenosis. Extracranial vertebral circulation: There is atherosclerotic  calcification at the origin of the right vertebral artery.  The bilateral extracranial vertebral arteries appear patent without evidence of high-grade stenosis or occlusion allowing for poor opacification. Intracranial Arteries: There is mild atherosclerotic calcification involving the cavernous segments of the bilateral ICAs.  The bilateral MCA and PANDA appear grossly patent without definite evidence of proximal large vessel occlusion allowing for suboptimal opacification. The left vertebral artery is dominant.  There is mild atherosclerotic calcification involving the V4 segments of the bilateral distal vertebral arteries.  The vertebral arteries and basilar artery appear grossly patent.  The bilateral PCAs appear grossly patent without evidence of proximal large vessel occlusion allowing for poor opacification. Venous structures (limited evaluation): Normal. Non-Vascular Structures of the Neck/Thoracic Inlet (limited evaluation): The parotid and submandibular glands appear within normal limits.  No bulky cervical chain lymph node enlargement identified.  The thyroid gland appears within normal limits.  The trachea is midline and patent.  Visualized lungs demonstrate no confluent airspace consolidation or pneumothorax.  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  There are degenerative change of the visualized cervical spine.     Suboptimal evaluation of the arterial vasculature secondary to poor opacification.  Allowing for exam limitations, no convincing evidence of high-grade stenosis or large vessel occlusion at the level of the [id number]. Mild atherosclerosis of the head and neck vasculature as above. Electronically signed by: Kirk Dooley MD Date:    11/01/2024 Time:    23:14    CT Abdomen Pelvis With IV Contrast NO Oral Contrast    Result Date: 11/1/2024  EXAMINATION: CT ABDOMEN PELVIS WITH IV CONTRAST CLINICAL HISTORY: Abdominal abscess/infection suspected;UC flare;  TECHNIQUE: Low dose axial images, sagittal and coronal reformations were obtained from the lung bases to the pubic symphysis following the IV administration of 100 mL of Omnipaque 350 .  Oral contrast was not given. COMPARISON: 09/28/2024 FINDINGS: The lung bases are unremarkable.  There is no pleural fluid present.  The visualized portions of the heart appear normal. Please note there is streak artifact from the patient's upper extremities limiting assessment of the abdominal contents.  The liver is enlarged.  No focal hepatic abnormality appreciated.  The gallbladder is surgically absent.  There is no intra-or extrahepatic biliary ductal dilatation. Stomach appears within normal limits.  There is a duodenal diverticulum present.  The spleen, pancreas, and adrenal glands demonstrate no acute abnormality. Kidneys are normal in size and location.  There is nonobstructing left-sided nephrolithiasis.  No evidence of hydronephrosis.  The urinary bladder is unremarkable. The prostate demonstrates no significant abnormality. The abdominal aorta is normal in course and caliber with mild atherosclerotic calcification along its course.  There is no retroperitoneal hematoma. There is no evidence of small-bowel obstruction.  There is a segment of descending and sigmoid colon demonstrating mild wall thickening and pericolonic inflammatory change suggestive of a nonspecific colitis, similar to prior study of 09/28/2024. There is no free intraperitoneal air or abscess identified.  No CT evidence of acute appendicitis.  There is no portal venous gas.  There are scattered shotty small mesenteric and retroperitoneal lymph nodes. Osseous structures demonstrate degenerative changes.  The extraperitoneal soft tissues are unremarkable.     Abnormal segment of descending and sigmoid colon demonstrating wall thickening and pericolonic inflammatory change suggestive of a nonspecific colitis including infectious, inflammatory, or ischemic  etiologies.  Findings appear similar compared to prior study of 09/28/2024. Nonobstructing left-sided nephrolithiasis. Additional findings as above. Electronically signed by: Kirk Dooley MD Date:    11/01/2024 Time:    23:03    X-Ray Chest AP Portable    Result Date: 11/1/2024  EXAMINATION: XR CHEST AP PORTABLE CLINICAL HISTORY: dizziness; Dizziness and giddiness TECHNIQUE: Single frontal view of the chest was performed. COMPARISON: 09/12/2024 FINDINGS: Cardiomediastinal silhouette is enlarged, similar to prior examination.  Stable radiograph appearance of the pulmonary vasculature.  Lungs are symmetrically expanded without evidence of new large confluent airspace consolidation.  No significant volume of pleural fluid or pneumothorax identified.  Osseous structures demonstrate mild degenerative changes.     Stable enlargement of the cardiomediastinal silhouette.  No convincing radiographic evidence of acute intrathoracic process on this single view. Electronically signed by: Kirk Dooley MD Date:    11/01/2024 Time:    22:48    CT Head Without Contrast    Result Date: 11/1/2024  EXAMINATION: CT HEAD WITHOUT CONTRAST CLINICAL HISTORY: Dizziness, persistent/recurrent, cardiac or vascular cause suspected; TECHNIQUE: Low dose axial images were obtained through the head.  Coronal and sagittal reformations were also performed. Contrast was not administered. COMPARISON: CT head 11/19/2023 FINDINGS: There is no acute intracranial hemorrhage, hydrocephalus, midline shift or mass effect. Gray-white matter differentiation appears maintained. The basal cisterns are patent. The paranasal sinuses appear well aerated without evidence of air-fluid level.  There is partial opacification of the right mastoid air cells, similar to prior study.  No displaced calvarial fracture identified.     No CT evidence of acute intracranial abnormality. Clinical correlation and further evaluation as warranted. Electronically signed  "by: Kirk Dooley MD Date:    11/01/2024 Time:    20:48  - pulls last radiology orders      Assessment/Plan:      * Ulcerative colitis flare  Currently on Omvah for treatment of UC, started in 4/2024, but patient reports progressively worsening symptoms despite compliance with med. He was on Remicade previously, but had to be stopped due to infectious complications.     Per GI, Dr. ChanelPlan already in works to change omvoh to tremfya .    C diff negative.  Discontinue p.o. vancomycin.   Continue empiric IV Cipro and metronidazole.   IV hydration.   Check TIBC / ferritin.   Advanced diet      Chronic disease anemia  Anemia is likely due to chronic blood loss. Most recent hemoglobin and hematocrit are listed below.  Recent Labs     11/17/24  1159 11/18/24  0409   HGB 10.7* 9.3*   HCT 34.3* 30.6*       Plan  - Monitor serial CBC: Daily  - Transfuse PRBC if patient becomes hemodynamically unstable, symptomatic or H/H drops below 7/21.  - Patient's anemia is currently stable  - monitor for overt bleeding  GI following  Transfusing if hgb <7    Type 2 diabetes mellitus without complication, without long-term current use of insulin  Patient's FSGs are controlled on current medication regimen.  Last A1c reviewed-   Lab Results   Component Value Date    HGBA1C 5.8 11/02/2024     Most recent fingerstick glucose reviewed- No results for input(s): "POCTGLUCOSE" in the last 24 hours.  Current correctional scale  Low  Maintain anti-hyperglycemic dose as follows-   Antihyperglycemics (From admission, onward)      None          Hold Oral hypoglycemics while patient is in the hospital.    Morbid obesity  Body mass index is 47.43 kg/m². Morbid obesity complicates all aspects of disease management from diagnostic modalities to treatment. Weight loss encouraged and health benefits explained to patient.           VTE Risk Mitigation (From admission, onward)           Ordered     IP VTE HIGH RISK PATIENT  Once         11/17/24 1818 "     Place sequential compression device  Until discontinued         11/17/24 1818     Reason for No Pharmacological VTE Prophylaxis  Once        Question:  Reasons:  Answer:  Active Bleeding    11/17/24 1818                    Discharge Planning   GERARDO: 11/19/2024     Code Status: Full Code   Is the patient medically ready for discharge?:     Reason for patient still in hospital (select all that apply): Patient trending condition and Treatment  Discharge Plan A: Home Health        Resume Tara Oshea MD  Department of Hospital Medicine   [facility]

## 2024-11-18 NOTE — H&P
[facility] - Emergency Dept  Hospital Medicine  History & Physical    Patient Name: Jacoby Jean-Baptiste  MRN: [id number]  Patient Class: IP- Inpatient  Admission Date: 11/17/2024  Attending Physician: Noe Juarez MD   Primary Care Provider: Hunter Aguilar III, MD         Patient information was obtained from patient, past medical records, and ER records.     Subjective:     Principal Problem:Ulcerative colitis    Chief Complaint:   Chief Complaint   Patient presents with    Abdominal Pain     X FEW DAYS, MOSTLY LEFT SIDE    Rectal Bleeding     SOFT STOOL WITH BLOOD NOTED        HPI: 49-year-old male with a past medical history of hypertension, non-insulin-dependent diabetes, obesity, hyperlipidemia, ulcerative colitis currently on Omvah , previous DVT on Xarelto who presents to the emergency room with reports of abdominal pain, rectal bleeding, bloody diarrhea for the last 2-3 days.  Patient sees Dr. Dr. Fraga [facility].  Denies chest pain, shortness breath, fever, generalized weakness.  Of note,He has history of recurrent C. Diff requiring fecal transplant in 2023, but C. Diff stool PCR has been repeatedly negative this year.   The ER, leukocytosis, WBCs on 11/15 was 13.7 now today 17.69, mild anemia with H&H 10.7, 34.3.  UA negative for infection, CT abdomen with evidence of active colitis.    Admit to hospital medicine for ulcerative colitis    Past Medical History:   Diagnosis Date    C. difficile colitis     Cavitary pneumonia 11/2023    pos aspergillus serology    Diabetes mellitus 01/2022    Hyperlipidemia 2015    Hypertension 2015    Insomnia 2015    Ulcerative colitis        Past Surgical History:   Procedure Laterality Date    BRONCHOSCOPY WITH FLUOROSCOPY Left 11/20/2023    Procedure: BRONCHOSCOPY, WITH FLUOROSCOPY;  Surgeon: Lisa Villarreal MD;  Location: [facility];  Service: Pulmonary;  Laterality: Left;    BRONCHOSCOPY WITH FLUOROSCOPY N/A 11/30/2023    Procedure:  BRONCHOSCOPY, WITH FLUOROSCOPY;  Surgeon: Lisa Villarreal MD;  Location: [facility];  Service: Pulmonary;  Laterality: N/A;    CARDIAC ELECTROPHYSIOLOGY MAPPING AND ABLATION  2017    SVT    CHOLECYSTECTOMY  2011    COLONOSCOPY N/A 5/3/2022    Procedure: COLONOSCOPY;  Surgeon: Shan Jean III, MD;  Location: [facility];  Service: Endoscopy;  Laterality: N/A;    COLONOSCOPY N/A 3/16/2024    Procedure: COLONOSCOPY;  Surgeon: ALEKSANDER Ramos MD;  Location: [facility];  Service: Endoscopy;  Laterality: N/A;    COLONOSCOPY WITH FECAL MICROBIOTA TRANSFER N/A 3/21/2023    Procedure: COLONOSCOPY, WITH FECAL MICROBIOTA TRANSFER;  Surgeon: David Millan MD;  Location: [facility];  Service: Endoscopy;  Laterality: N/A;    ESOPHAGOGASTRODUODENOSCOPY N/A 4/24/2023    Procedure: EGD (ESOPHAGOGASTRODUODENOSCOPY);  Surgeon: Shan Jean III, MD;  Location: [facility];  Service: Endoscopy;  Laterality: N/A;    ESOPHAGOGASTRODUODENOSCOPY N/A 6/5/2024    Procedure: EGD (ESOPHAGOGASTRODUODENOSCOPY);  Surgeon: Odalis Mccabe MD;  Location: [facility];  Service: Endoscopy;  Laterality: N/A;    FLEXIBLE SIGMOIDOSCOPY N/A 6/5/2024    Procedure: SIGMOIDOSCOPY, FLEXIBLE;  Surgeon: Odalis Mccabe MD;  Location: [facility];  Service: Endoscopy;  Laterality: N/A;    FLEXIBLE SIGMOIDOSCOPY N/A 7/16/2024    Procedure: SIGMOIDOSCOPY, FLEXIBLE;  Surgeon: Shan Jean III, MD;  Location: [facility];  Service: Endoscopy;  Laterality: N/A;    FLEXIBLE SIGMOIDOSCOPY N/A 8/13/2024    Procedure: SIGMOIDOSCOPY, FLEXIBLE;  Surgeon: Shan Jean III, MD;  Location: [facility];  Service: Endoscopy;  Laterality: N/A;    GANGLION CYST EXCISION Left 1992    UMBILICAL HERNIA REPAIR  2011    with mesh       Review of patient's allergies indicates:  No Known Allergies    Current Facility-Administered Medications on File Prior to Encounter   Medication    lactated ringers infusion     Current Outpatient Medications on File  Prior to Encounter   Medication Sig    busPIRone (BUSPAR) 15 MG tablet Take 1 tablet (15 mg total) by mouth 3 (three) times daily.    colchicine (COLCRYS) 0.6 mg tablet Take 1 tablet (0.6 mg total) by mouth 2 (two) times daily.    hyoscyamine (LEVBID) 0.375 mg Tb12 Take 0.375 mg by mouth 2 (two) times daily.    LORazepam (ATIVAN) 0.5 MG tablet Take 1 tablet (0.5 mg total) by mouth every 6 (six) hours as needed for Anxiety.    metoprolol succinate (TOPROL-XL) 50 MG 24 hr tablet Take 1 tablet (50 mg total) by mouth once daily.    pantoprazole (PROTONIX) 40 MG tablet Take 40 mg by mouth 2 (two) times daily.    predniSONE (DELTASONE) 10 MG tablet Take 4 tablets (40 mg total) by mouth once daily for 7 days, THEN 3 tablets (30 mg total) once daily for 7 days, THEN 2 tablets (20 mg total) once daily for 7 days, THEN 1 tablet (10 mg total) once daily for 7 days.    promethazine (PHENERGAN) 25 MG tablet Take 25 mg by mouth 4 (four) times daily as needed for Nausea.    rivaroxaban (XARELTO) 20 mg Tab Take 1 tablet (20 mg total) by mouth daily with dinner or evening meal.    sertraline (ZOLOFT) 100 MG tablet Take 1 tablet (100 mg total) by mouth once daily. (Patient taking differently: Take 100 mg by mouth once daily. Takes with 50 mg)    sertraline (ZOLOFT) 50 MG tablet Take 1 tablet (50 mg total) by mouth once daily. (Patient taking differently: Take 50 mg by mouth once daily. Takes with 100 mg)    simvastatin (ZOCOR) 20 MG tablet Take 1 tablet (20 mg total) by mouth every evening.    zolpidem (AMBIEN) 10 mg Tab Take 1 tablet (10 mg total) by mouth nightly as needed (insomnia).    ergocalciferol (VITAMIN D2) 50,000 unit Cap Take 1 capsule (50,000 Units total) by mouth every 7 days. (Patient not taking: Reported on 11/8/2024)    HYDROcodone-acetaminophen (NORCO) 5-325 mg per tablet Take 1 tablet by mouth every 6 (six) hours as needed for Pain. (Patient not taking: Reported on 11/17/2024)    [DISCONTINUED] budesonide (ENTOCORT  EC) 3 mg capsule Take 9 mg by mouth once daily.    [DISCONTINUED] BUDESONIDE RECT Place 1 enema rectally 2 (two) times a day.    [DISCONTINUED] cholestyramine (QUESTRAN) 4 gram packet Take 1 packet by mouth 3 (three) times daily. (Patient not taking: Reported on 11/8/2024)     Family History       Problem Relation (Age of Onset)    Asthma Mother          Tobacco Use    Smoking status: Former     Average packs/day: 1 pack/day for 30.0 years (30.0 ttl pk-yrs)     Types: Cigarettes     Start date: 1993    Smokeless tobacco: Never    Tobacco comments:     Pt states he has stopped smoking with Chantix three weeks ago. 12/1/23   Substance and Sexual Activity    Alcohol use: Yes     Comment: ocass    Drug use: Not Currently    Sexual activity: Not Currently     Review of Systems   Constitutional: Negative.    HENT: Negative.     Respiratory: Negative.     Cardiovascular: Negative.    Gastrointestinal:  Positive for abdominal pain, blood in stool and diarrhea.   Genitourinary: Negative.    Musculoskeletal: Negative.    Psychiatric/Behavioral: Negative.       Objective:     Vital Signs (Most Recent):  Temp: 98.5 °F (36.9 °C) (11/17/24 1107)  Pulse: 79 (11/17/24 1730)  Resp: 20 (11/17/24 1730)  BP: 139/74 (11/17/24 1730)  SpO2: 97 % (11/17/24 1730) Vital Signs (24h Range):  Temp:  [98.5 °F (36.9 °C)] 98.5 °F (36.9 °C)  Pulse:  [] 79  Resp:  [16-24] 20  SpO2:  [95 %-100 %] 97 %  BP: (125-148)/(70-87) 139/74     Weight: 129.3 kg (285 lb)  Body mass index is 47.43 kg/m².     Physical Exam  Vitals reviewed.   Constitutional:       General: He is not in acute distress.     Appearance: Normal appearance. He is not ill-appearing.   HENT:      Head: Normocephalic and atraumatic.      Mouth/Throat:      Mouth: Mucous membranes are moist.   Eyes:      Extraocular Movements: Extraocular movements intact.   Cardiovascular:      Rate and Rhythm: Normal rate and regular rhythm.      Heart sounds: Murmur heard.   Pulmonary:       "Effort: Pulmonary effort is normal. No respiratory distress.      Breath sounds: Normal breath sounds.   Abdominal:      General: Bowel sounds are normal. There is no distension.      Palpations: Abdomen is soft.      Tenderness: There is no abdominal tenderness.   Musculoskeletal:         General: No swelling. Normal range of motion.      Cervical back: Neck supple.   Skin:     General: Skin is warm and dry.      Capillary Refill: Capillary refill takes less than 2 seconds.   Neurological:      General: No focal deficit present.      Mental Status: He is alert. Mental status is at baseline.   Psychiatric:         Mood and Affect: Mood normal.                Significant Labs: All pertinent labs within the past 24 hours have been reviewed.  Bilirubin:   Recent Labs   Lab 11/01/24  2204 11/02/24  0456 11/03/24  0903 11/04/24  0434 11/17/24  1159   BILITOT 0.3 0.4 0.5 0.4 0.4     CBC:   Recent Labs   Lab 11/17/24  1159   WBC 17.69*   HGB 10.7*   HCT 34.3*        CMP:   Recent Labs   Lab 11/17/24  1159      K 3.8      CO2 28   *   BUN 12   CREATININE 1.1   CALCIUM 9.0   PROT 6.9   ALBUMIN 3.8   BILITOT 0.4   ALKPHOS 68   AST 11   ALT 8*   ANIONGAP 6*     Coagulation: No results for input(s): "PT", "INR", "APTT" in the last 48 hours.  Lactic Acid: No results for input(s): "LACTATE" in the last 48 hours.  Lipid Panel: No results for input(s): "CHOL", "HDL", "LDLCALC", "TRIG", "CHOLHDL" in the last 48 hours.  Magnesium: No results for input(s): "MG" in the last 48 hours.  POCT Glucose: No results for input(s): "POCTGLUCOSE" in the last 48 hours.  TSH:   Recent Labs   Lab 11/01/24  2204   TSH 0.515     Urine Studies:   Recent Labs   Lab 11/17/24  1325   COLORU Yellow   APPEARANCEUA Clear   PHUR 6.0   SPECGRAV 1.020   PROTEINUA Negative   GLUCUA Negative   KETONESU Negative   BILIRUBINUA Negative   OCCULTUA 1+*   NITRITE Negative   UROBILINOGEN Negative   LEUKOCYTESUR Negative   RBCUA 4   WBCUA 2 "   SQUAMEPITHEL 0       Significant Imaging: I have reviewed all pertinent imaging results/findings within the past 24 hours.  Imaging Results              CT Abdomen Pelvis With IV Contrast NO Oral Contrast (Final result)  Result time 11/17/24 15:08:38      Final result by Rickie Brown MD (11/17/24 15:08:38)                   Impression:      Wall thickening and pericolonic inflammatory changes involving the descending and proximal sigmoid colon consistent with infectious/inflammatory colitis.  Findings are similar to previous CT exams from earlier this month and September of this year.  Consider follow-up with GI/colonoscopy.    Nonobstructing nephrolithiasis.    Borderline hepatic steatosis.      Electronically signed by: Rickie Brown  Date:    11/17/2024  Time:    15:08               Narrative:    EXAMINATION:  CT ABDOMEN PELVIS WITH IV CONTRAST    CLINICAL HISTORY:  Abdominal pain;    TECHNIQUE:  Axial CT imaging obtained through the abdomen and pelvis after 100 mL Omnipaque 350.  Coronal and sagittal reformatted images.    CMS Mandated Quality Data-CT Radiation Dose-436    All CT scans at this facility dose modulation, iterative reconstruction, and or weight-based dosing when appropriate to reduce radiation dose to as low as reasonably achievable.    COMPARISON:  CT abdomen and pelvis 11/01/2024 and 09/28/2024 previous exams.    FINDINGS:  Lung bases are clear.  Bone window images show no acute or aggressive osseous abnormality.  Degenerative changes of the spine and hips.    Borderline hepatic steatosis.  No focal hepatic lesion.  Gallbladder is absent.  No intrahepatic or extrahepatic bile duct dilation.  Portal vein is patent.  Spleen is unremarkable.  Small left upper quadrant splenule.  Pancreas is within normal limits.  Duodenal diverticulum noted.  No adrenal lesion.  Tiny nonobstructing right renal calculi.  Numerous nonobstructing left renal calculi ranging in size from 3 mm to 6 mm.  Ureters  "are normal in caliber.  Urinary bladder shows no focal lesion.    Stomach is unremarkable.  No evidence of small-bowel obstruction.  Normal appendix.  Proximal colon is unremarkable.  Lack of haustration involving the distal transverse colon.  Descending colon shows wall thickening with pericolonic inflammatory stranding and prominent vascularity which is similar to previous exams.    No free fluid, free air, or abscess in the abdomen or pelvis.  No lymphadenopathy.                                      Assessment/Plan:     * Ulcerative colitis  Currently on Omvah for treatment of UC, started in 4/2024, but patient reports progressively worsening symptoms despite compliance with med. He was on Remicade previously, but had to be stopped due to infectious complications.     C diff PCR  Giardia specimen collected and pending  Start  Zosyn IV, oral vancomycin  IV protonix  Continue IV methylprednisolone 30 mg BID  GI following    Per GI, Dr. Chanel  If C. Diff negative, can stop Vancomycin and continue steroids only. Ok to start clear liquid diet. Long term, patient will probably need change in biologic therapy.     Hold xarelto tonight        Chronic disease anemia  Anemia is likely due to chronic blood loss. Most recent hemoglobin and hematocrit are listed below.  Recent Labs     11/15/24  1027 11/17/24  1159   HGB 10.5* 10.7*   HCT 35.3* 34.3*     Plan  - Monitor serial CBC: Daily  - Transfuse PRBC if patient becomes hemodynamically unstable, symptomatic or H/H drops below 7/21.  - Patient's anemia is currently stable  - monitor for overt bleeding  GI following  Transfusing if hgb <7    Type 2 diabetes mellitus without complication, without long-term current use of insulin  Patient's FSGs are controlled on current medication regimen.  Last A1c reviewed-   Lab Results   Component Value Date    HGBA1C 5.8 11/02/2024     Most recent fingerstick glucose reviewed- No results for input(s): "POCTGLUCOSE" in the last 24 " hours.  Current correctional scale  Low  Maintain anti-hyperglycemic dose as follows-   Antihyperglycemics (From admission, onward)      Start     Stop Route Frequency Ordered    11/17/24 1918  insulin aspart U-100 pen 0-5 Units         -- SubQ Before meals & nightly PRN 11/17/24 1818          Hold Oral hypoglycemics while patient is in the hospital.    Morbid obesity  Body mass index is 47.43 kg/m². Morbid obesity complicates all aspects of disease management from diagnostic modalities to treatment. Weight loss encouraged and health benefits explained to patient.           VTE Risk Mitigation (From admission, onward)           Ordered     IP VTE HIGH RISK PATIENT  Once         11/17/24 1818     Place sequential compression device  Until discontinued         11/17/24 1818     Reason for No Pharmacological VTE Prophylaxis  Once        Question:  Reasons:  Answer:  Active Bleeding    11/17/24 1818                       Hold xarelto tonight. Awaiting further GI recommendations             Jillian Lee NP  Department of Hospital Medicine  [facility] - Emergency Dept

## 2024-11-18 NOTE — SUBJECTIVE & OBJECTIVE
Interval History:   Seen and examined at multidisciplinary rounds, diarrhea improved, no fever or chills, no recent sick contact.  C diff negative.     Review of Systems   Gastrointestinal:  Positive for abdominal pain, blood in stool, diarrhea and vomiting.     Objective:     Vital Signs (Most Recent):  Temp: 97.9 °F (36.6 °C) (11/18/24 1100)  Pulse: 72 (11/18/24 1100)  Resp: 18 (11/18/24 1256)  BP: (!) 144/76 (11/18/24 1100)  SpO2: 99 % (11/18/24 1100) Vital Signs (24h Range):  Temp:  [97.8 °F (36.6 °C)-98.3 °F (36.8 °C)] 97.9 °F (36.6 °C)  Pulse:  [68-86] 72  Resp:  [15-22] 18  SpO2:  [93 %-99 %] 99 %  BP: (119-144)/(70-87) 144/76     Weight: 127.4 kg (280 lb 14.4 oz)  Body mass index is 46.74 kg/m².    Intake/Output Summary (Last 24 hours) at 11/18/2024 1332  Last data filed at 11/18/2024 1243  Gross per 24 hour   Intake 2420 ml   Output --   Net 2420 ml         Physical Exam  Vitals and nursing note reviewed.   Constitutional:       Appearance: He is obese.   Eyes:      Pupils: Pupils are equal, round, and reactive to light.   Neck:      Vascular: No JVD.   Cardiovascular:      Rate and Rhythm: Normal rate and regular rhythm.      Heart sounds: Normal heart sounds.   Pulmonary:      Effort: Pulmonary effort is normal.      Breath sounds: Normal breath sounds.   Abdominal:      General: Bowel sounds are normal. There is no distension.      Palpations: Abdomen is soft.      Tenderness: There is no abdominal tenderness.   Musculoskeletal:         General: No deformity.   Lymphadenopathy:      Cervical: No cervical adenopathy.   Skin:     Coloration: Skin is not pale.      Findings: No rash.             Significant Labs: All pertinent labs within the past 24 hours have been reviewed.  CBC:   Recent Labs   Lab 11/17/24  1159 11/18/24  0409   WBC 17.69* 10.67   HGB 10.7* 9.3*   HCT 34.3* 30.6*    285     CMP:   Recent Labs   Lab 11/17/24  1159 11/18/24  0409    135*   K 3.8 4.3    103   CO2 28 26    * 124*   BUN 12 10   CREATININE 1.1 1.0   CALCIUM 9.0 8.8   PROT 6.9 6.4   ALBUMIN 3.8 3.5   BILITOT 0.4 0.4   ALKPHOS 68 64   AST 11 9*   ALT 8* 7*   ANIONGAP 6* 6*       Significant Imaging: I have reviewed all pertinent imaging results/findings within the past 24 hours.  I have reviewed and interpreted all pertinent imaging results/findings within the past 24 hours.    Scheduled Meds:   atorvastatin  10 mg Oral Daily    busPIRone  15 mg Oral TID    ciprofloxacin  400 mg Intravenous Q12H    famotidine  20 mg Oral BID    methylPREDNISolone injection (PEDS and ADULTS)  30 mg Intravenous BID    metoprolol succinate  50 mg Oral Daily    metroNIDAZOLE IV (PEDS and ADULTS)  500 mg Intravenous Q8H    sertraline  150 mg Oral Daily     Continuous Infusions:   0.9% NaCl   Intravenous Continuous 75 mL/hr at 11/17/24 2052 New Bag at 11/17/24 2052     PRN Meds:.  Current Facility-Administered Medications:     acetaminophen, 650 mg, Oral, Q4H PRN    HYDROcodone-acetaminophen, 1 tablet, Oral, Q6H PRN    HYDROmorphone, 0.5 mg, Intravenous, Q4H PRN    LORazepam, 0.5 mg, Oral, Q6H PRN    magnesium oxide, 800 mg, Oral, PRN    magnesium oxide, 800 mg, Oral, PRN    melatonin, 9 mg, Oral, Nightly PRN    naloxone, 0.02 mg, Intravenous, PRN    ondansetron, 4 mg, Intravenous, Q6H PRN    potassium bicarbonate, 35 mEq, Oral, PRN    potassium bicarbonate, 50 mEq, Oral, PRN    potassium bicarbonate, 60 mEq, Oral, PRN    potassium, sodium phosphates, 2 packet, Oral, PRN    potassium, sodium phosphates, 2 packet, Oral, PRN    potassium, sodium phosphates, 2 packet, Oral, PRN    senna-docusate 8.6-50 mg, 1 tablet, Oral, BID PRN    sodium chloride 0.9%, 10 mL, Intravenous, PRN    sodium chloride 0.9%, 2 mL, Intravenous, Q12H PRN    zolpidem, 10 mg, Oral, Nightly PRN    CT Abdomen Pelvis With IV Contrast NO Oral Contrast    Result Date: 11/17/2024  EXAMINATION: CT ABDOMEN PELVIS WITH IV CONTRAST CLINICAL HISTORY: Abdominal pain;  TECHNIQUE: Axial CT imaging obtained through the abdomen and pelvis after 100 mL Omnipaque 350.  Coronal and sagittal reformatted images. CMS Mandated Quality Data-CT Radiation Dose-436 All CT scans at this facility dose modulation, iterative reconstruction, and or weight-based dosing when appropriate to reduce radiation dose to as low as reasonably achievable. COMPARISON: CT abdomen and pelvis 11/01/2024 and 09/28/2024 previous exams. FINDINGS: Lung bases are clear.  Bone window images show no acute or aggressive osseous abnormality.  Degenerative changes of the spine and hips. Borderline hepatic steatosis.  No focal hepatic lesion.  Gallbladder is absent.  No intrahepatic or extrahepatic bile duct dilation.  Portal vein is patent.  Spleen is unremarkable.  Small left upper quadrant splenule.  Pancreas is within normal limits.  Duodenal diverticulum noted.  No adrenal lesion.  Tiny nonobstructing right renal calculi.  Numerous nonobstructing left renal calculi ranging in size from 3 mm to 6 mm.  Ureters are normal in caliber.  Urinary bladder shows no focal lesion. Stomach is unremarkable.  No evidence of small-bowel obstruction.  Normal appendix.  Proximal colon is unremarkable.  Lack of haustration involving the distal transverse colon.  Descending colon shows wall thickening with pericolonic inflammatory stranding and prominent vascularity which is similar to previous exams. No free fluid, free air, or abscess in the abdomen or pelvis.  No lymphadenopathy.     Wall thickening and pericolonic inflammatory changes involving the descending and proximal sigmoid colon consistent with infectious/inflammatory colitis.  Findings are similar to previous CT exams from earlier this month and September of this year.  Consider follow-up with GI/colonoscopy. Nonobstructing nephrolithiasis. Borderline hepatic steatosis. Electronically signed by: Rickie Brown Date:    11/17/2024 Time:    15:08    US Upper Extremity Veins  Right    Result Date: 11/2/2024  EXAMINATION: US UPPER EXTREMITY VEINS RIGHT CLINICAL HISTORY: deep vein thrombosis; TECHNIQUE: Duplex and color flow Doppler evaluation and dynamic compression was performed of the right upper extremity veins. COMPARISON: 10/08/2024 FINDINGS: Central veins: The internal jugular, subclavian, and axillary veins are patent and free of thrombus. Arm veins: Previously noted thrombus within the basilic vein is no longer definitively visualized.  The brachial vein appears patent.  There is a venous catheter within the cephalic vein. Other findings: None.     No thrombus in the central veins of the right upper extremity.  Previously noted thrombus within the right basilic vein is no longer definitively visualized. Electronically signed by: Kirk Dooley MD Date:    11/02/2024 Time:    17:54    MRI Brain Without Contrast    Result Date: 11/1/2024  EXAMINATION: MRI BRAIN WITHOUT CONTRAST CLINICAL HISTORY: Dizziness, persistent/recurrent, cardiac or vascular cause suspected;Acute focal neurological deficit;. TECHNIQUE: Multiplanar multisequence MR imaging of the brain was performed without contrast. COMPARISON: CTA head neck 11/01/2024. FINDINGS: Intracranial compartment: Ventricles and sulci are normal in size for age without evidence of hydrocephalus. No extra-axial blood or fluid collections. The brain parenchyma appears normal. No mass lesion, acute hemorrhage, edema or acute infarct. Normal vascular flow voids are preserved. Skull/extracranial contents (limited evaluation): Bone marrow signal intensity is normal.     No acute abnormality. Electronically signed by: Russ Medina MD Date:    11/01/2024 Time:    23:38    CTA Head and Neck (xpd)    Result Date: 11/1/2024  EXAMINATION: CTA HEAD AND NECK (XPD) CLINICAL HISTORY: Dizziness, persistent/recurrent, cardiac or vascular cause suspected; TECHNIQUE: CT angiogram was performed from the level of the greg to the top of the head  following the IV administration of 100mL of Omnipaque 350.   Sagittal and coronal reconstructions and maximum intensity projection reconstructions were performed. Arterial stenosis percentages are based on NASCET measurement criteria. COMPARISON: CTA head 11/19/2023, CT head without contrast 11/01/2024 FINDINGS: Please note evaluation is limited secondary to poor opacification of the arterial vasculature limiting assessment. Aorta: Normal 3 vessel arch. Extracranial carotid circulation: Bilateral common carotid arteries are patent.  There is atherosclerotic calcification of the carotid bifurcations, right greater than left, without evidence of a hemodynamically significant stenosis. Extracranial vertebral circulation: There is atherosclerotic calcification at the origin of the right vertebral artery.  The bilateral extracranial vertebral arteries appear patent without evidence of high-grade stenosis or occlusion allowing for poor opacification. Intracranial Arteries: There is mild atherosclerotic calcification involving the cavernous segments of the bilateral ICAs.  The bilateral MCA and PANDA appear grossly patent without definite evidence of proximal large vessel occlusion allowing for suboptimal opacification. The left vertebral artery is dominant.  There is mild atherosclerotic calcification involving the V4 segments of the bilateral distal vertebral arteries.  The vertebral arteries and basilar artery appear grossly patent.  The bilateral PCAs appear grossly patent without evidence of proximal large vessel occlusion allowing for poor opacification. Venous structures (limited evaluation): Normal. Non-Vascular Structures of the Neck/Thoracic Inlet (limited evaluation): The parotid and submandibular glands appear within normal limits.  No bulky cervical chain lymph node enlargement identified.  The thyroid gland appears within normal limits.  The trachea is midline and patent.  Visualized lungs demonstrate no  confluent airspace consolidation or pneumothorax.  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  There are degenerative change of the visualized cervical spine.     Suboptimal evaluation of the arterial vasculature secondary to poor opacification.  Allowing for exam limitations, no convincing evidence of high-grade stenosis or large vessel occlusion at the level of the [id number]. Mild atherosclerosis of the head and neck vasculature as above. Electronically signed by: Kirk Dooley MD Date:    11/01/2024 Time:    23:14    CT Abdomen Pelvis With IV Contrast NO Oral Contrast    Result Date: 11/1/2024  EXAMINATION: CT ABDOMEN PELVIS WITH IV CONTRAST CLINICAL HISTORY: Abdominal abscess/infection suspected;UC flare; TECHNIQUE: Low dose axial images, sagittal and coronal reformations were obtained from the lung bases to the pubic symphysis following the IV administration of 100 mL of Omnipaque 350 .  Oral contrast was not given. COMPARISON: 09/28/2024 FINDINGS: The lung bases are unremarkable.  There is no pleural fluid present.  The visualized portions of the heart appear normal. Please note there is streak artifact from the patient's upper extremities limiting assessment of the abdominal contents.  The liver is enlarged.  No focal hepatic abnormality appreciated.  The gallbladder is surgically absent.  There is no intra-or extrahepatic biliary ductal dilatation. Stomach appears within normal limits.  There is a duodenal diverticulum present.  The spleen, pancreas, and adrenal glands demonstrate no acute abnormality. Kidneys are normal in size and location.  There is nonobstructing left-sided nephrolithiasis.  No evidence of hydronephrosis.  The urinary bladder is unremarkable. The prostate demonstrates no significant abnormality. The abdominal aorta is normal in course and caliber with mild atherosclerotic calcification along its course.  There is no retroperitoneal hematoma. There is no  evidence of small-bowel obstruction.  There is a segment of descending and sigmoid colon demonstrating mild wall thickening and pericolonic inflammatory change suggestive of a nonspecific colitis, similar to prior study of 09/28/2024. There is no free intraperitoneal air or abscess identified.  No CT evidence of acute appendicitis.  There is no portal venous gas.  There are scattered shotty small mesenteric and retroperitoneal lymph nodes. Osseous structures demonstrate degenerative changes.  The extraperitoneal soft tissues are unremarkable.     Abnormal segment of descending and sigmoid colon demonstrating wall thickening and pericolonic inflammatory change suggestive of a nonspecific colitis including infectious, inflammatory, or ischemic etiologies.  Findings appear similar compared to prior study of 09/28/2024. Nonobstructing left-sided nephrolithiasis. Additional findings as above. Electronically signed by: Kirk Dooley MD Date:    11/01/2024 Time:    23:03    X-Ray Chest AP Portable    Result Date: 11/1/2024  EXAMINATION: XR CHEST AP PORTABLE CLINICAL HISTORY: dizziness; Dizziness and giddiness TECHNIQUE: Single frontal view of the chest was performed. COMPARISON: 09/12/2024 FINDINGS: Cardiomediastinal silhouette is enlarged, similar to prior examination.  Stable radiograph appearance of the pulmonary vasculature.  Lungs are symmetrically expanded without evidence of new large confluent airspace consolidation.  No significant volume of pleural fluid or pneumothorax identified.  Osseous structures demonstrate mild degenerative changes.     Stable enlargement of the cardiomediastinal silhouette.  No convincing radiographic evidence of acute intrathoracic process on this single view. Electronically signed by: Kirk Dooley MD Date:    11/01/2024 Time:    22:48    CT Head Without Contrast    Result Date: 11/1/2024  EXAMINATION: CT HEAD WITHOUT CONTRAST CLINICAL HISTORY: Dizziness, persistent/recurrent,  cardiac or vascular cause suspected; TECHNIQUE: Low dose axial images were obtained through the head.  Coronal and sagittal reformations were also performed. Contrast was not administered. COMPARISON: CT head 11/19/2023 FINDINGS: There is no acute intracranial hemorrhage, hydrocephalus, midline shift or mass effect. Gray-white matter differentiation appears maintained. The basal cisterns are patent. The paranasal sinuses appear well aerated without evidence of air-fluid level.  There is partial opacification of the right mastoid air cells, similar to prior study.  No displaced calvarial fracture identified.     No CT evidence of acute intracranial abnormality. Clinical correlation and further evaluation as warranted. Electronically signed by: Kirk Dooley MD Date:    11/01/2024 Time:    20:48  - pulls last radiology orders

## 2024-11-18 NOTE — PLAN OF CARE
[facility]  Initial Discharge Assessment       Primary Care Provider: Hunter Aguilar III, MD    Admission Diagnosis: UC (ulcerative colitis) [K51.90]    Admission Date: 11/17/2024  Expected Discharge Date: 11/19/2024     met with the patient at the bedside to complete the initial discharge assessment plan. Demographics, PCP, and other information on the face sheet verified by patient as being correct.        The patient reported the following:       He does not have an Advance Directive and his surrogate decision makers is Danny (sister in law). The patient reports he does not use DME, still drive, and is independent in ADLs.  He reports being on HHC with [facility] [place] for his wound care and would like to stay with them. He has a nurse that come on Monday and Fridays to do wound care. He also goes OP to the wound care clinic 1 day/week. Pharmacy: [facility] in [place]. PCP: Hunter Aguilar III- last seen 2 weeks ago. The patient drove here and will drive himself home at discharge.       Anticipated DC Disposition: Home with HANNAH w/[facility]      CM will continue to follow and assess DC needs throughout this hospitalization.     Transition of Care Barriers: None    Payor: [employer] / Plan: [employer] ([employer]) / Product Type: Commercial /     Extended Emergency Contact Information  Primary Emergency Contact: Estiven (Sister-In-Law)Jessica  Address: [address] [place] [address] [address] [facility]  Home Phone: [phone]  Mobile Phone: [phone]  Relation: Sister  Preferred language: English   needed? No  Secondary Emergency Contact: Dallas Jean-Baptiste   [place]  Mobile Phone: [phone]  Relation: Brother  Preferred language: English   needed? No    Discharge Plan A: Home  Discharge Plan B: Home with family      [facility] [place] - [address]  [address]  [facility] [address]  Phone: [phone] Fax: [phone]    [facility]  [address]  [facility] [address]  Phone: [phone] Fax: [phone]    [id number] - [place], [place] - [address] OF [facility]  [address]  [address]  [place] [address]  Phone: [phone] Fax: [phone]      Initial Assessment (most recent)       Adult Discharge Assessment - 11/18/24 1147          Discharge Assessment    Assessment Type Discharge Planning Assessment     Confirmed/corrected address, phone number and insurance Yes     Confirmed Demographics Correct on Facesheet     Source of Information patient     When was your last doctors appointment? 11/08/24     Does patient/caregiver understand observation status Yes     Communicated GERARDO with patient/caregiver Yes     Reason For Admission Ulcerative Colitis     Facility Arrived From: Home     Do you expect to return to your current living situation? Yes     Prior to hospitilization cognitive status: Alert/Oriented     Current cognitive status: Alert/Oriented     Walking or Climbing Stairs Difficulty no     Dressing/Bathing Difficulty no     Home Accessibility wheelchair accessible     Home Layout Able to live on 1st floor     Equipment Currently Used at Home none     Readmission within 30 days? Yes     Do you currently have service(s) that help you manage your care at home? Yes     Name and Contact number of agency [facility]     Is the pt/caregiver preference to resume services with current agency Yes     Do you take prescription medications? Yes     Do you have prescription coverage? Yes     Do you have any problems affording any of your prescribed medications? No     Who is going to help you get home at discharge? Patient will drive     How do you get to doctors appointments? car, drives self     Are you on dialysis? No     Do you take coumadin? No     Discharge Plan A Home     Discharge Plan B  Home with family     DME Needed Upon Discharge  none     Discharge Plan discussed with: Patient     Transition of Care Barriers None

## 2024-11-18 NOTE — NURSING
Pt's IV went bad. [facility] called for new IV. PICC line order placed. IV fluids/antibiotics on hold.    Dressing and packing removed from wound to medial upper left arm. Pictures taken. Hydrofera blue moistened with saline and tegaderm applied per wound care.

## 2024-11-18 NOTE — ASSESSMENT & PLAN NOTE
Body mass index is 47.43 kg/m². Morbid obesity complicates all aspects of disease management from diagnostic modalities to treatment. Weight loss encouraged and health benefits explained to patient.

## 2024-11-18 NOTE — ASSESSMENT & PLAN NOTE
"Patient's FSGs are controlled on current medication regimen.  Last A1c reviewed-   Lab Results   Component Value Date    HGBA1C 5.8 11/02/2024     Most recent fingerstick glucose reviewed- No results for input(s): "POCTGLUCOSE" in the last 24 hours.  Current correctional scale  Low  Maintain anti-hyperglycemic dose as follows-   Antihyperglycemics (From admission, onward)      Start     Stop Route Frequency Ordered    11/17/24 1918  insulin aspart U-100 pen 0-5 Units         -- SubQ Before meals & nightly PRN 11/17/24 1818          Hold Oral hypoglycemics while patient is in the hospital.  "

## 2024-11-18 NOTE — ASSESSMENT & PLAN NOTE
Currently on Omvah for treatment of UC, started in 4/2024, but patient reports progressively worsening symptoms despite compliance with med. He was on Remicade previously, but had to be stopped due to infectious complications.     Per GI, Dr. ChanelPlan already in works to change omvoh to tremfya .    C diff negative.  Discontinue p.o. vancomycin.   Continue empiric IV Cipro and metronidazole.   IV hydration.   Check TIBC / ferritin.   Advanced diet

## 2024-11-18 NOTE — PROGRESS NOTES
GASTROENTEROLOGY INPATIENT PROGRESS NOTE  Patient Name: Jacoby Jean-Baptiste  Patient MRN: [id number]  Patient : 1975    Admit Date: 2024  Service date: 2024    PCP: Hunter Aguilar III, MD      HPI: Patient is a 49 y.o. male with PMHx   This is a very pleasant 50yo M with ulcerative colitis, hx of recurrent C. Diff, HTN, HLD, who presents to the ED with complaints of abd pain, bloody diarrhea.  He is currently on Omvah- he has been on it since 2024- but has had progressively worsening symptoms related to his UC.  This is his 2nd ER visit this month.  He has history of recurrent C. Diff requiring fecal transplant in , but C. Diff stool PCR has been repeatedly negative this year.  CT this admission showed evidence of active colitis. Labs significant for elevated WBC of 17K. .       S: Patient reports that he is feeling slightly better with the steroids that were initiated. His stool volume has slowed down. He has already been contacted by Dr. Jean's nurse to initiate new biologic therapy. .     Inpatient Medications:   atorvastatin  10 mg Oral Daily    busPIRone  15 mg Oral TID    ciprofloxacin  400 mg Intravenous Q12H    famotidine  20 mg Oral BID    methylPREDNISolone injection (PEDS and ADULTS)  30 mg Intravenous BID    metoprolol succinate  50 mg Oral Daily    metroNIDAZOLE IV (PEDS and ADULTS)  500 mg Intravenous Q8H    sertraline  150 mg Oral Daily         Review of Systems:  GENERAL: No fever, chills, weight loss  SKIN: No rashes, jaundice, pruritus  HEENT: No rhinorrhea, epistaxis, vision changes.  No trauma, tinnitus, lymphadenopathy or pharyngitis  CV: No chest pain, palpitations, edima or BAILEY  PULM: No cough or sputum production.  No wheezing.  GI: AS IN HPI  URINARY:  No hematuria, dysuria  MS: No change in muscle or joint pain, weakness, or ROM  Neuro: No focal neurologic changes  PSYCH: No change in mood or personality.  No suicidal ideation.  ENDOCRINE: No  fatigue      OBJECTIVE:    Vitals:    11/18/24 0419 11/18/24 0542 11/18/24 0719 11/18/24 0855   BP: 133/78 119/75 136/84    BP Location: Right arm  Left arm    Patient Position: Lying  Lying    Pulse: 73 71 68    Resp: 18 18 16 16   Temp: 98.3 °F (36.8 °C) 97.8 °F (36.6 °C) 98.3 °F (36.8 °C)    TempSrc: Rectal Oral Oral    SpO2: 97% 95% 97%    Weight:       Height:           Physical Exam:    GEN: well-developed, well-nourished, awake and alert, non-toxic appearing adult  HEENT: PERRL, sclera anicteric, oral mucosa pink and moist without lesion  NECK: trachea midline; Good ROM  CV: regular rate and rhythm, no murmurs or gallops  RESP: clear to auscultation bilaterally, no wheezes, rhonci or rales  ABD: soft, non-tender, non-distended, normal bowel sounds  EXT: no swelling or edema, 2+ pulses distally  SKIN: no rashes or jaundice  PSYCH: normal affect    Labs:   Recent Labs   Lab 11/15/24  1027 11/17/24  1159 11/18/24  0409   WBC 13.70* 17.69* 10.67   HGB 10.5* 10.7* 9.3*    295 285   MCV 92 91 90     Recent Labs   Lab 11/18/24  0409   IRON 15*     Recent Labs   Lab 11/15/24  1027 11/17/24  1159 11/18/24  0409    142 135*   K 4.1 3.8 4.3   BUN 15 12 10   CREATININE 1.1 1.1 1.0   ALBUMIN  --  3.8 3.5   AST  --  11 9*   ALT  --  8* 7*   ALKPHOS  --  68 64         Radiology Review:          IMPRESSION / RECOMMENDATIONS:  49 y.o. male with PMHx 5HLD, DM on ozempic, HTN, LOLIS, umbilical hernia repair, tobacco use, C. diff s/p Vanco / dificid / FMT / rebyota   Ulcerative colitis  IBD serology panel negative.    Ok to advance diet and transition to oral steroid course and discharge home as he advances  Stool collection for cdiff negative.  Stool calprotectin ordered and pending to trend disease   Plan already in works to change omvoh to tremfya      Odalis T Eliot  11/18/2024  11:08 AM

## 2024-11-18 NOTE — ASSESSMENT & PLAN NOTE
Anemia is likely due to chronic blood loss. Most recent hemoglobin and hematocrit are listed below.  Recent Labs     11/17/24  1159 11/18/24  0409   HGB 10.7* 9.3*   HCT 34.3* 30.6*       Plan  - Monitor serial CBC: Daily  - Transfuse PRBC if patient becomes hemodynamically unstable, symptomatic or H/H drops below 7/21.  - Patient's anemia is currently stable  - monitor for overt bleeding  GI following  Transfusing if hgb <7

## 2024-11-19 LAB
ALBUMIN SERPL BCP-MCNC: 3.6 G/DL (ref 3.5–5.2)
ALP SERPL-CCNC: 61 U/L (ref 55–135)
ALT SERPL W/O P-5'-P-CCNC: 8 U/L (ref 10–44)
ANION GAP SERPL CALC-SCNC: 7 MMOL/L (ref 8–16)
AST SERPL-CCNC: 10 U/L (ref 10–40)
BASOPHILS # BLD AUTO: 0.01 K/UL (ref 0–0.2)
BASOPHILS NFR BLD: 0.1 % (ref 0–1.9)
BILIRUB SERPL-MCNC: 0.4 MG/DL (ref 0.1–1)
BUN SERPL-MCNC: 10 MG/DL (ref 6–20)
CALCIUM SERPL-MCNC: 9 MG/DL (ref 8.7–10.5)
CHLORIDE SERPL-SCNC: 101 MMOL/L (ref 95–110)
CO2 SERPL-SCNC: 27 MMOL/L (ref 23–29)
CREAT SERPL-MCNC: 1 MG/DL (ref 0.5–1.4)
DIFFERENTIAL METHOD BLD: ABNORMAL
EOSINOPHIL # BLD AUTO: 0 K/UL (ref 0–0.5)
EOSINOPHIL NFR BLD: 0 % (ref 0–8)
ERYTHROCYTE [DISTWIDTH] IN BLOOD BY AUTOMATED COUNT: 14.4 % (ref 11.5–14.5)
EST. GFR  (NO RACE VARIABLE): >60 ML/MIN/1.73 M^2
GLUCOSE SERPL-MCNC: 147 MG/DL (ref 70–110)
HCT VFR BLD AUTO: 30.8 % (ref 40–54)
HGB BLD-MCNC: 9.5 G/DL (ref 14–18)
IMM GRANULOCYTES # BLD AUTO: 0.04 K/UL (ref 0–0.04)
IMM GRANULOCYTES NFR BLD AUTO: 0.4 % (ref 0–0.5)
LYMPHOCYTES # BLD AUTO: 0.7 K/UL (ref 1–4.8)
LYMPHOCYTES NFR BLD: 7.2 % (ref 18–48)
MCH RBC QN AUTO: 27.2 PG (ref 27–31)
MCHC RBC AUTO-ENTMCNC: 30.8 G/DL (ref 32–36)
MCV RBC AUTO: 88 FL (ref 82–98)
MONOCYTES # BLD AUTO: 0.4 K/UL (ref 0.3–1)
MONOCYTES NFR BLD: 4.8 % (ref 4–15)
NEUTROPHILS # BLD AUTO: 8.1 K/UL (ref 1.8–7.7)
NEUTROPHILS NFR BLD: 87.5 % (ref 38–73)
NRBC BLD-RTO: 0 /100 WBC
PLATELET # BLD AUTO: 276 K/UL (ref 150–450)
PMV BLD AUTO: 10.7 FL (ref 9.2–12.9)
POTASSIUM SERPL-SCNC: 4.3 MMOL/L (ref 3.5–5.1)
PROT SERPL-MCNC: 6.4 G/DL (ref 6–8.4)
RBC # BLD AUTO: 3.49 M/UL (ref 4.6–6.2)
SODIUM SERPL-SCNC: 135 MMOL/L (ref 136–145)
WBC # BLD AUTO: 9.21 K/UL (ref 3.9–12.7)

## 2024-11-19 PROCEDURE — 99231 SBSQ HOSP IP/OBS SF/LOW 25: CPT | Mod: ,,, | Performed by: FAMILY MEDICINE

## 2024-11-19 PROCEDURE — 25000003 PHARM REV CODE 250: Performed by: INTERNAL MEDICINE

## 2024-11-19 PROCEDURE — 25000003 PHARM REV CODE 250: Performed by: HOSPITALIST

## 2024-11-19 PROCEDURE — 21400001 HC TELEMETRY ROOM

## 2024-11-19 PROCEDURE — 63600175 PHARM REV CODE 636 W HCPCS: Performed by: HOSPITALIST

## 2024-11-19 PROCEDURE — 25000003 PHARM REV CODE 250: Performed by: NURSE PRACTITIONER

## 2024-11-19 PROCEDURE — 80053 COMPREHEN METABOLIC PANEL: CPT | Performed by: NURSE PRACTITIONER

## 2024-11-19 PROCEDURE — 36415 COLL VENOUS BLD VENIPUNCTURE: CPT | Performed by: NURSE PRACTITIONER

## 2024-11-19 PROCEDURE — 85025 COMPLETE CBC W/AUTO DIFF WBC: CPT | Performed by: NURSE PRACTITIONER

## 2024-11-19 PROCEDURE — 63600175 PHARM REV CODE 636 W HCPCS: Performed by: INTERNAL MEDICINE

## 2024-11-19 PROCEDURE — 63600175 PHARM REV CODE 636 W HCPCS: Mod: JZ,JG | Performed by: INTERNAL MEDICINE

## 2024-11-19 PROCEDURE — 63600175 PHARM REV CODE 636 W HCPCS: Performed by: NURSE PRACTITIONER

## 2024-11-19 RX ADMIN — HYDROCODONE BITARTRATE AND ACETAMINOPHEN 1 TABLET: 5; 325 TABLET ORAL at 10:11

## 2024-11-19 RX ADMIN — METOPROLOL SUCCINATE 50 MG: 50 TABLET, FILM COATED, EXTENDED RELEASE ORAL at 08:11

## 2024-11-19 RX ADMIN — HYDROCODONE BITARTRATE AND ACETAMINOPHEN 1 TABLET: 5; 325 TABLET ORAL at 04:11

## 2024-11-19 RX ADMIN — CIPROFLOXACIN 400 MG: 2 INJECTION, SOLUTION INTRAVENOUS at 10:11

## 2024-11-19 RX ADMIN — RIVAROXABAN 20 MG: 10 TABLET, FILM COATED ORAL at 05:11

## 2024-11-19 RX ADMIN — HYDROMORPHONE HYDROCHLORIDE 0.5 MG: 1 INJECTION, SOLUTION INTRAMUSCULAR; INTRAVENOUS; SUBCUTANEOUS at 05:11

## 2024-11-19 RX ADMIN — FAMOTIDINE 20 MG: 20 TABLET ORAL at 08:11

## 2024-11-19 RX ADMIN — ZOLPIDEM TARTRATE 10 MG: 5 TABLET, COATED ORAL at 09:11

## 2024-11-19 RX ADMIN — CYANOCOBALAMIN 1000 MCG: 1000 INJECTION INTRAMUSCULAR; SUBCUTANEOUS at 08:11

## 2024-11-19 RX ADMIN — HYDROMORPHONE HYDROCHLORIDE 0.5 MG: 1 INJECTION, SOLUTION INTRAMUSCULAR; INTRAVENOUS; SUBCUTANEOUS at 11:11

## 2024-11-19 RX ADMIN — BUSPIRONE HYDROCHLORIDE 15 MG: 5 TABLET ORAL at 02:11

## 2024-11-19 RX ADMIN — ONDANSETRON 4 MG: 2 INJECTION INTRAMUSCULAR; INTRAVENOUS at 05:11

## 2024-11-19 RX ADMIN — HYDROMORPHONE HYDROCHLORIDE 0.5 MG: 1 INJECTION, SOLUTION INTRAMUSCULAR; INTRAVENOUS; SUBCUTANEOUS at 06:11

## 2024-11-19 RX ADMIN — BUSPIRONE HYDROCHLORIDE 15 MG: 5 TABLET ORAL at 08:11

## 2024-11-19 RX ADMIN — ATORVASTATIN CALCIUM 10 MG: 10 TABLET, FILM COATED ORAL at 08:11

## 2024-11-19 RX ADMIN — Medication 9 MG: at 12:11

## 2024-11-19 RX ADMIN — LORAZEPAM 0.5 MG: 0.5 TABLET ORAL at 12:11

## 2024-11-19 RX ADMIN — METRONIDAZOLE 500 MG: 500 INJECTION, SOLUTION INTRAVENOUS at 12:11

## 2024-11-19 RX ADMIN — CIPROFLOXACIN 400 MG: 2 INJECTION, SOLUTION INTRAVENOUS at 09:11

## 2024-11-19 RX ADMIN — HYDROMORPHONE HYDROCHLORIDE 0.5 MG: 1 INJECTION, SOLUTION INTRAMUSCULAR; INTRAVENOUS; SUBCUTANEOUS at 12:11

## 2024-11-19 RX ADMIN — METHYLPREDNISOLONE SODIUM SUCCINATE 30 MG: 40 INJECTION, POWDER, FOR SOLUTION INTRAMUSCULAR; INTRAVENOUS at 08:11

## 2024-11-19 RX ADMIN — SERTRALINE HYDROCHLORIDE 150 MG: 50 TABLET ORAL at 08:11

## 2024-11-19 RX ADMIN — SODIUM CHLORIDE 125 MG: 9 INJECTION, SOLUTION INTRAVENOUS at 08:11

## 2024-11-19 RX ADMIN — ONDANSETRON 4 MG: 2 INJECTION INTRAMUSCULAR; INTRAVENOUS at 08:11

## 2024-11-19 RX ADMIN — METRONIDAZOLE 500 MG: 500 INJECTION, SOLUTION INTRAVENOUS at 08:11

## 2024-11-19 RX ADMIN — METRONIDAZOLE 500 MG: 500 INJECTION, SOLUTION INTRAVENOUS at 06:11

## 2024-11-19 RX ADMIN — HYDROCODONE BITARTRATE AND ACETAMINOPHEN 1 TABLET: 5; 325 TABLET ORAL at 08:11

## 2024-11-19 NOTE — CONSULTS
Dr. Jimenes rounded for wound care consult. Patient with wound to left arm. Hydrofera blue dressing applied by patients nurse after Dr. Jimenes assessed this area. Wound care team to follow.

## 2024-11-19 NOTE — PROGRESS NOTES
Chief complaint:  Abdominal Pain (X FEW DAYS, MOSTLY LEFT SIDE) and Rectal Bleeding (SOFT STOOL WITH BLOOD NOTED)      HPI:  Jacoby Jean-Baptiste is a 49 y.o. male presenting with an open surgical wound of the left arm s/p I and D of an abscess. Pt known to me from OP clinic, and wound continues to improve. No new complaints today    11/19/24  Pt seen at bedside for a FU visit for left arm surgical wound. Wound is stable, doing well. Pt had an OP appt tomorrow and has switched till Monday. No new complaints today    PMH:  As per HPI and below:  Past Medical History:   Diagnosis Date    C. difficile colitis     Cavitary pneumonia 11/2023    pos aspergillus serology    Diabetes mellitus 01/2022    Hyperlipidemia 2015    Hypertension 2015    Insomnia 2015    Ulcerative colitis        Social History     Socioeconomic History    Marital status: Single   Tobacco Use    Smoking status: Former     Average packs/day: 1 pack/day for 30.0 years (30.0 ttl pk-yrs)     Types: Cigarettes     Start date: 1993    Smokeless tobacco: Never    Tobacco comments:     Pt states he has stopped smoking with Chantix three weeks ago. 12/1/23   Substance and Sexual Activity    Alcohol use: Yes     Comment: ocass    Drug use: Not Currently    Sexual activity: Not Currently     Social Drivers of Health     Financial Resource Strain: Low Risk  (11/17/2024)    Overall Financial Resource Strain (CARDIA)     Difficulty of Paying Living Expenses: Not hard at all   Food Insecurity: No Food Insecurity (11/17/2024)    Hunger Vital Sign     Worried About Running Out of Food in the Last Year: Never true     Ran Out of Food in the Last Year: Never true   Transportation Needs: No Transportation Needs (11/17/2024)    TRANSPORTATION NEEDS     Transportation : No   Physical Activity: Sufficiently Active (9/11/2024)    Exercise Vital Sign     Days of Exercise per Week: 5 days     Minutes of Exercise per Session: 30 min   Recent Concern: Physical Activity - Inactive  (8/10/2024)    Exercise Vital Sign     Days of Exercise per Week: 0 days     Minutes of Exercise per Session: 0 min   Stress: Stress Concern Present (11/17/2024)    [employer] - Occupational Stress Questionnaire     Feeling of Stress : To some extent   Housing Stability: Low Risk  (11/17/2024)    Housing Stability Vital Sign     Unable to Pay for Housing in the Last Year: No     Homeless in the Last Year: No       Past Surgical History:   Procedure Laterality Date    BRONCHOSCOPY WITH FLUOROSCOPY Left 11/20/2023    Procedure: BRONCHOSCOPY, WITH FLUOROSCOPY;  Surgeon: Lisa Villarreal MD;  Location: [facility];  Service: Pulmonary;  Laterality: Left;    BRONCHOSCOPY WITH FLUOROSCOPY N/A 11/30/2023    Procedure: BRONCHOSCOPY, WITH FLUOROSCOPY;  Surgeon: Lisa Villarreal MD;  Location: [facility];  Service: Pulmonary;  Laterality: N/A;    CARDIAC ELECTROPHYSIOLOGY MAPPING AND ABLATION  2017    SVT    CHOLECYSTECTOMY  2011    COLONOSCOPY N/A 5/3/2022    Procedure: COLONOSCOPY;  Surgeon: Shan Jean III, MD;  Location: [facility];  Service: Endoscopy;  Laterality: N/A;    COLONOSCOPY N/A 3/16/2024    Procedure: COLONOSCOPY;  Surgeon: ALEKSANDER Ramos MD;  Location: [facility];  Service: Endoscopy;  Laterality: N/A;    COLONOSCOPY WITH FECAL MICROBIOTA TRANSFER N/A 3/21/2023    Procedure: COLONOSCOPY, WITH FECAL MICROBIOTA TRANSFER;  Surgeon: David Millan MD;  Location: [facility];  Service: Endoscopy;  Laterality: N/A;    ESOPHAGOGASTRODUODENOSCOPY N/A 4/24/2023    Procedure: EGD (ESOPHAGOGASTRODUODENOSCOPY);  Surgeon: Shan Jean III, MD;  Location: [facility];  Service: Endoscopy;  Laterality: N/A;    ESOPHAGOGASTRODUODENOSCOPY N/A 6/5/2024    Procedure: EGD (ESOPHAGOGASTRODUODENOSCOPY);  Surgeon: Odalis Mccabe MD;  Location: [facility];  Service: Endoscopy;  Laterality: N/A;    FLEXIBLE SIGMOIDOSCOPY N/A 6/5/2024    Procedure: SIGMOIDOSCOPY, FLEXIBLE;  Surgeon:  Odalis Mccabe MD;  Location: [facility];  Service: Endoscopy;  Laterality: N/A;    FLEXIBLE SIGMOIDOSCOPY N/A 7/16/2024    Procedure: SIGMOIDOSCOPY, FLEXIBLE;  Surgeon: Shan Jean III, MD;  Location: [facility];  Service: Endoscopy;  Laterality: N/A;    FLEXIBLE SIGMOIDOSCOPY N/A 8/13/2024    Procedure: SIGMOIDOSCOPY, FLEXIBLE;  Surgeon: Shan Jean III, MD;  Location: [facility];  Service: Endoscopy;  Laterality: N/A;    GANGLION CYST EXCISION Left 1992    UMBILICAL HERNIA REPAIR  2011    with mesh       Family History   Problem Relation Name Age of Onset    Asthma Mother         Review of patient's allergies indicates:  No Known Allergies    Current Facility-Administered Medications on File Prior to Encounter   Medication Dose Route Frequency Provider Last Rate Last Admin    lactated ringers infusion   Intravenous Continuous David Millan MD 75 mL/hr at 03/21/23 1252 New Bag at 03/21/23 1252     Current Outpatient Medications on File Prior to Encounter   Medication Sig Dispense Refill    busPIRone (BUSPAR) 15 MG tablet Take 1 tablet (15 mg total) by mouth 3 (three) times daily. 90 tablet 5    colchicine (COLCRYS) 0.6 mg tablet Take 1 tablet (0.6 mg total) by mouth 2 (two) times daily. 180 tablet 0    hyoscyamine (LEVBID) 0.375 mg Tb12 Take 0.375 mg by mouth 2 (two) times daily.      LORazepam (ATIVAN) 0.5 MG tablet Take 1 tablet (0.5 mg total) by mouth every 6 (six) hours as needed for Anxiety. 90 tablet 5    metoprolol succinate (TOPROL-XL) 50 MG 24 hr tablet Take 1 tablet (50 mg total) by mouth once daily. 90 tablet 1    pantoprazole (PROTONIX) 40 MG tablet Take 40 mg by mouth 2 (two) times daily.      predniSONE (DELTASONE) 10 MG tablet Take 4 tablets (40 mg total) by mouth once daily for 7 days, THEN 3 tablets (30 mg total) once daily for 7 days, THEN 2 tablets (20 mg total) once daily for 7 days, THEN 1 tablet (10 mg total) once daily for 7 days. 70 tablet 0    promethazine  "(PHENERGAN) 25 MG tablet Take 25 mg by mouth 4 (four) times daily as needed for Nausea.      sertraline (ZOLOFT) 100 MG tablet Take 1 tablet (100 mg total) by mouth once daily. (Patient taking differently: Take 100 mg by mouth once daily. Takes with 50 mg) 90 tablet 1    sertraline (ZOLOFT) 50 MG tablet Take 1 tablet (50 mg total) by mouth once daily. (Patient taking differently: Take 50 mg by mouth once daily. Takes with 100 mg) 90 tablet 1    simvastatin (ZOCOR) 20 MG tablet Take 1 tablet (20 mg total) by mouth every evening. 90 tablet 1    zolpidem (AMBIEN) 10 mg Tab Take 1 tablet (10 mg total) by mouth nightly as needed (insomnia). 30 tablet 2    ergocalciferol (VITAMIN D2) 50,000 unit Cap Take 1 capsule (50,000 Units total) by mouth every 7 days. (Patient not taking: Reported on 2024) 12 capsule 1    HYDROcodone-acetaminophen (NORCO) 5-325 mg per tablet Take 1 tablet by mouth every 6 (six) hours as needed for Pain. (Patient not taking: Reported on 2024) 12 tablet 0       ROS: As per HPI and below:  Pertinent items are noted in HPI.      Physical Exam:     Vitals:    24 0629 24 0747 24 0910 24 0945   BP:   132/68 136/67   BP Location:   Left arm Left arm   Patient Position:  Lying Lying Lying   Pulse:  72 68 72   Resp: 18 18 17 17   Temp:  97.6 °F (36.4 °C) 97.9 °F (36.6 °C) 97.5 °F (36.4 °C)   TempSrc:  Oral Oral Oral   SpO2:  98% 98% 97%   Weight:       Height:           BP  Min: 119/75  Max: 148/80  Temp  Av °F (36.7 °C)  Min: 97.5 °F (36.4 °C)  Max: 98.5 °F (36.9 °C)  Pulse  Av.8  Min: 68  Max: 104  Resp  Av.9  Min: 15  Max: 24  SpO2  Av.8 %  Min: 93 %  Max: 100 %  Height  Av' 5" (165.1 cm)  Min: 5' 5" (165.1 cm)  Max: 5' 5" (165.1 cm)  Weight  Av.3 kg (282 lb 15.2 oz)  Min: 127.4 kg (280 lb 14.4 oz)  Max: 129.3 kg (285 lb)    Body mass index is 46.74 kg/m².          General:             Well developed, well nourished, no apparent " distress  HEENT:              NCAT, no JVD, mucous membranes moist, EOM intact  Cardiovascular:  Regular rate and rhythm, normal S1, normal S2, No murmurs, rubs, or gallops  Respiratory:        Normal breath sounds, no wheezes, no rales, no rhonchi  Abdomen:           Bowel sounds present, non tender, non distended, no masses, no hepatojugular reflux  Extremities:        No clubbing, no cyanosis, no edema  Vascular:            2+ b/l radial.  Peripheral pulses intact.  No carotid bruits.  Neurological:      No focal deficits  Skin:                   No obvious rashes or erythema, open surgical wound of the left arm      Lab Results   Component Value Date    WBC 9.21 11/19/2024    HGB 9.5 (L) 11/19/2024    HCT 30.8 (L) 11/19/2024    MCV 88 11/19/2024     11/19/2024     Lab Results   Component Value Date    CHOL 162 11/01/2024    CHOL 128 09/25/2024    CHOL 142 10/27/2023     Lab Results   Component Value Date    HDL 48 11/01/2024    HDL 69 09/25/2024    HDL 44 10/27/2023     Lab Results   Component Value Date    LDLCALC 88.4 11/01/2024    LDLCALC 33.0 (L) 09/25/2024    LDLCALC 81.4 10/27/2023     Lab Results   Component Value Date    TRIG 128 11/01/2024    TRIG 130 09/25/2024    TRIG 83 10/27/2023     Lab Results   Component Value Date    CHOLHDL 29.6 11/01/2024    CHOLHDL 53.9 (H) 09/25/2024    CHOLHDL 31.0 10/27/2023     CMP  Recent Labs   Lab 11/19/24  0450   *   CALCIUM 9.0   ALBUMIN 3.6   PROT 6.4   *   K 4.3   CO2 27      BUN 10   CREATININE 1.0   ALKPHOS 61   ALT 8*   AST 10   BILITOT 0.4      Lab Results   Component Value Date    TSH 0.515 11/01/2024         Assessment and Recommendations       Diagnoses:    Open surgical wound oif the left arm    Plan:  Hydrofera blue to the left arm wound daily  FU at the OP clinic after DC Monday appointment    Complexity:    moderate

## 2024-11-20 ENCOUNTER — DOCUMENT SCAN (OUTPATIENT)
Dept: HOME HEALTH SERVICES | Facility: HOSPITAL | Age: 49
End: 2024-11-20
Payer: COMMERCIAL

## 2024-11-20 LAB
ALBUMIN SERPL BCP-MCNC: 3.7 G/DL (ref 3.5–5.2)
ALP SERPL-CCNC: 64 U/L (ref 55–135)
ALT SERPL W/O P-5'-P-CCNC: 10 U/L (ref 10–44)
ANION GAP SERPL CALC-SCNC: 5 MMOL/L (ref 8–16)
AST SERPL-CCNC: 12 U/L (ref 10–40)
BASOPHILS # BLD AUTO: 0.01 K/UL (ref 0–0.2)
BASOPHILS NFR BLD: 0.1 % (ref 0–1.9)
BILIRUB SERPL-MCNC: 0.3 MG/DL (ref 0.1–1)
BUN SERPL-MCNC: 8 MG/DL (ref 6–20)
CALCIUM SERPL-MCNC: 9.7 MG/DL (ref 8.7–10.5)
CHLORIDE SERPL-SCNC: 103 MMOL/L (ref 95–110)
CO2 SERPL-SCNC: 30 MMOL/L (ref 23–29)
CREAT SERPL-MCNC: 1 MG/DL (ref 0.5–1.4)
DIFFERENTIAL METHOD BLD: ABNORMAL
EOSINOPHIL # BLD AUTO: 0 K/UL (ref 0–0.5)
EOSINOPHIL NFR BLD: 0 % (ref 0–8)
ERYTHROCYTE [DISTWIDTH] IN BLOOD BY AUTOMATED COUNT: 14.5 % (ref 11.5–14.5)
EST. GFR  (NO RACE VARIABLE): >60 ML/MIN/1.73 M^2
GLUCOSE SERPL-MCNC: 142 MG/DL (ref 70–110)
HCT VFR BLD AUTO: 32.8 % (ref 40–54)
HGB BLD-MCNC: 9.9 G/DL (ref 14–18)
IMM GRANULOCYTES # BLD AUTO: 0.1 K/UL (ref 0–0.04)
IMM GRANULOCYTES NFR BLD AUTO: 0.9 % (ref 0–0.5)
LYMPHOCYTES # BLD AUTO: 0.8 K/UL (ref 1–4.8)
LYMPHOCYTES NFR BLD: 7.6 % (ref 18–48)
MCH RBC QN AUTO: 26.8 PG (ref 27–31)
MCHC RBC AUTO-ENTMCNC: 30.2 G/DL (ref 32–36)
MCV RBC AUTO: 89 FL (ref 82–98)
MONOCYTES # BLD AUTO: 0.5 K/UL (ref 0.3–1)
MONOCYTES NFR BLD: 4.5 % (ref 4–15)
NEUTROPHILS # BLD AUTO: 9.3 K/UL (ref 1.8–7.7)
NEUTROPHILS NFR BLD: 86.9 % (ref 38–73)
NRBC BLD-RTO: 0 /100 WBC
PLATELET # BLD AUTO: 294 K/UL (ref 150–450)
PMV BLD AUTO: 10.6 FL (ref 9.2–12.9)
POTASSIUM SERPL-SCNC: 5 MMOL/L (ref 3.5–5.1)
PROT SERPL-MCNC: 6.8 G/DL (ref 6–8.4)
RBC # BLD AUTO: 3.7 M/UL (ref 4.6–6.2)
SODIUM SERPL-SCNC: 138 MMOL/L (ref 136–145)
WBC # BLD AUTO: 10.69 K/UL (ref 3.9–12.7)

## 2024-11-20 PROCEDURE — 0JBF0ZZ EXCISION OF LEFT UPPER ARM SUBCUTANEOUS TISSUE AND FASCIA, OPEN APPROACH: ICD-10-PCS | Performed by: FAMILY MEDICINE

## 2024-11-20 PROCEDURE — 21400001 HC TELEMETRY ROOM

## 2024-11-20 PROCEDURE — 63600175 PHARM REV CODE 636 W HCPCS: Performed by: HOSPITALIST

## 2024-11-20 PROCEDURE — 63600175 PHARM REV CODE 636 W HCPCS: Performed by: NURSE PRACTITIONER

## 2024-11-20 PROCEDURE — 25000003 PHARM REV CODE 250: Performed by: FAMILY MEDICINE

## 2024-11-20 PROCEDURE — 99499 UNLISTED E&M SERVICE: CPT | Mod: ,,, | Performed by: FAMILY MEDICINE

## 2024-11-20 PROCEDURE — 11042 DBRDMT SUBQ TIS 1ST 20SQCM/<: CPT | Mod: ,,, | Performed by: FAMILY MEDICINE

## 2024-11-20 PROCEDURE — 80053 COMPREHEN METABOLIC PANEL: CPT | Performed by: NURSE PRACTITIONER

## 2024-11-20 PROCEDURE — 25000003 PHARM REV CODE 250: Performed by: NURSE PRACTITIONER

## 2024-11-20 PROCEDURE — 25000003 PHARM REV CODE 250: Performed by: HOSPITALIST

## 2024-11-20 PROCEDURE — 63600175 PHARM REV CODE 636 W HCPCS: Mod: JZ,JG | Performed by: INTERNAL MEDICINE

## 2024-11-20 PROCEDURE — 36415 COLL VENOUS BLD VENIPUNCTURE: CPT | Performed by: NURSE PRACTITIONER

## 2024-11-20 PROCEDURE — 25000003 PHARM REV CODE 250: Performed by: INTERNAL MEDICINE

## 2024-11-20 PROCEDURE — 63600175 PHARM REV CODE 636 W HCPCS: Performed by: INTERNAL MEDICINE

## 2024-11-20 PROCEDURE — 85025 COMPLETE CBC W/AUTO DIFF WBC: CPT | Performed by: NURSE PRACTITIONER

## 2024-11-20 PROCEDURE — 99232 SBSQ HOSP IP/OBS MODERATE 35: CPT | Mod: 25,,, | Performed by: FAMILY MEDICINE

## 2024-11-20 RX ORDER — LIDOCAINE HYDROCHLORIDE 20 MG/ML
JELLY TOPICAL ONCE
Status: COMPLETED | OUTPATIENT
Start: 2024-11-20 | End: 2024-11-20

## 2024-11-20 RX ADMIN — METRONIDAZOLE 500 MG: 500 INJECTION, SOLUTION INTRAVENOUS at 08:11

## 2024-11-20 RX ADMIN — BUSPIRONE HYDROCHLORIDE 15 MG: 5 TABLET ORAL at 02:11

## 2024-11-20 RX ADMIN — CIPROFLOXACIN 400 MG: 2 INJECTION, SOLUTION INTRAVENOUS at 09:11

## 2024-11-20 RX ADMIN — Medication 9 MG: at 12:11

## 2024-11-20 RX ADMIN — HYDROCODONE BITARTRATE AND ACETAMINOPHEN 1 TABLET: 5; 325 TABLET ORAL at 02:11

## 2024-11-20 RX ADMIN — METHYLPREDNISOLONE SODIUM SUCCINATE 30 MG: 40 INJECTION, POWDER, FOR SOLUTION INTRAMUSCULAR; INTRAVENOUS at 08:11

## 2024-11-20 RX ADMIN — HYDROCODONE BITARTRATE AND ACETAMINOPHEN 1 TABLET: 5; 325 TABLET ORAL at 06:11

## 2024-11-20 RX ADMIN — SODIUM CHLORIDE 125 MG: 9 INJECTION, SOLUTION INTRAVENOUS at 08:11

## 2024-11-20 RX ADMIN — ONDANSETRON 4 MG: 2 INJECTION INTRAMUSCULAR; INTRAVENOUS at 02:11

## 2024-11-20 RX ADMIN — CIPROFLOXACIN 400 MG: 2 INJECTION, SOLUTION INTRAVENOUS at 10:11

## 2024-11-20 RX ADMIN — METRONIDAZOLE 500 MG: 500 INJECTION, SOLUTION INTRAVENOUS at 04:11

## 2024-11-20 RX ADMIN — BUSPIRONE HYDROCHLORIDE 15 MG: 5 TABLET ORAL at 08:11

## 2024-11-20 RX ADMIN — METOPROLOL SUCCINATE 50 MG: 50 TABLET, FILM COATED, EXTENDED RELEASE ORAL at 08:11

## 2024-11-20 RX ADMIN — ACETAMINOPHEN 650 MG: 325 TABLET ORAL at 02:11

## 2024-11-20 RX ADMIN — LORAZEPAM 0.5 MG: 0.5 TABLET ORAL at 12:11

## 2024-11-20 RX ADMIN — HYDROMORPHONE HYDROCHLORIDE 0.5 MG: 1 INJECTION, SOLUTION INTRAMUSCULAR; INTRAVENOUS; SUBCUTANEOUS at 11:11

## 2024-11-20 RX ADMIN — HYDROMORPHONE HYDROCHLORIDE 0.5 MG: 1 INJECTION, SOLUTION INTRAMUSCULAR; INTRAVENOUS; SUBCUTANEOUS at 04:11

## 2024-11-20 RX ADMIN — LIDOCAINE HYDROCHLORIDE: 20 JELLY TOPICAL at 03:11

## 2024-11-20 RX ADMIN — FAMOTIDINE 20 MG: 20 TABLET ORAL at 08:11

## 2024-11-20 RX ADMIN — HYDROCODONE BITARTRATE AND ACETAMINOPHEN 1 TABLET: 5; 325 TABLET ORAL at 09:11

## 2024-11-20 RX ADMIN — METRONIDAZOLE 500 MG: 500 INJECTION, SOLUTION INTRAVENOUS at 12:11

## 2024-11-20 RX ADMIN — ONDANSETRON 4 MG: 2 INJECTION INTRAMUSCULAR; INTRAVENOUS at 08:11

## 2024-11-20 RX ADMIN — RIVAROXABAN 20 MG: 10 TABLET, FILM COATED ORAL at 04:11

## 2024-11-20 RX ADMIN — ATORVASTATIN CALCIUM 10 MG: 10 TABLET, FILM COATED ORAL at 08:11

## 2024-11-20 RX ADMIN — SERTRALINE HYDROCHLORIDE 150 MG: 50 TABLET ORAL at 08:11

## 2024-11-20 RX ADMIN — ZOLPIDEM TARTRATE 10 MG: 5 TABLET, COATED ORAL at 10:11

## 2024-11-20 RX ADMIN — Medication 9 MG: at 10:11

## 2024-11-20 RX ADMIN — CYANOCOBALAMIN 1000 MCG: 1000 INJECTION INTRAMUSCULAR; SUBCUTANEOUS at 08:11

## 2024-11-20 NOTE — PROGRESS NOTES
Chief complaint:  Abdominal Pain (X FEW DAYS, MOSTLY LEFT SIDE) and Rectal Bleeding (SOFT STOOL WITH BLOOD NOTED)      HPI:  Jacoby Jean-Baptiste is a 49 y.o. male presenting with an open surgical wound of the left arm s/p I and D of an abscess. Pt known to me from OP clinic, and wound continues to improve. No new complaints today    11/19/24  Pt seen at bedside for a FU visit for left arm surgical wound. Wound is stable, doing well. Pt had an OP appt tomorrow and has switched till Monday. No new complaints today    11/20/24  Pt seen at bedside for a FU visit for a left arm open surgical wound. Wound will need to be debrided. Wound is stable. No other complaints today    PMH:  As per HPI and below:  Past Medical History:   Diagnosis Date    C. difficile colitis     Cavitary pneumonia 11/2023    pos aspergillus serology    Diabetes mellitus 01/2022    Hyperlipidemia 2015    Hypertension 2015    Insomnia 2015    Ulcerative colitis        Social History     Socioeconomic History    Marital status: Single   Tobacco Use    Smoking status: Former     Average packs/day: 1 pack/day for 30.0 years (30.0 ttl pk-yrs)     Types: Cigarettes     Start date: 1993    Smokeless tobacco: Never    Tobacco comments:     Pt states he has stopped smoking with Chantix three weeks ago. 12/1/23   Substance and Sexual Activity    Alcohol use: Yes     Comment: ocass    Drug use: Not Currently    Sexual activity: Not Currently     Social Drivers of Health     Financial Resource Strain: Low Risk  (11/17/2024)    Overall Financial Resource Strain (CARDIA)     Difficulty of Paying Living Expenses: Not hard at all   Food Insecurity: No Food Insecurity (11/17/2024)    Hunger Vital Sign     Worried About Running Out of Food in the Last Year: Never true     Ran Out of Food in the Last Year: Never true   Transportation Needs: No Transportation Needs (11/17/2024)    TRANSPORTATION NEEDS     Transportation : No   Physical Activity: Sufficiently Active  (9/11/2024)    Exercise Vital Sign     Days of Exercise per Week: 5 days     Minutes of Exercise per Session: 30 min   Recent Concern: Physical Activity - Inactive (8/10/2024)    Exercise Vital Sign     Days of Exercise per Week: 0 days     Minutes of Exercise per Session: 0 min   Stress: Stress Concern Present (11/17/2024)    [employer] - Occupational Stress Questionnaire     Feeling of Stress : To some extent   Housing Stability: Low Risk  (11/17/2024)    Housing Stability Vital Sign     Unable to Pay for Housing in the Last Year: No     Homeless in the Last Year: No       Past Surgical History:   Procedure Laterality Date    BRONCHOSCOPY WITH FLUOROSCOPY Left 11/20/2023    Procedure: BRONCHOSCOPY, WITH FLUOROSCOPY;  Surgeon: Lisa Villarreal MD;  Location: [facility];  Service: Pulmonary;  Laterality: Left;    BRONCHOSCOPY WITH FLUOROSCOPY N/A 11/30/2023    Procedure: BRONCHOSCOPY, WITH FLUOROSCOPY;  Surgeon: Lisa Villarreal MD;  Location: [facility];  Service: Pulmonary;  Laterality: N/A;    CARDIAC ELECTROPHYSIOLOGY MAPPING AND ABLATION  2017    SVT    CHOLECYSTECTOMY  2011    COLONOSCOPY N/A 5/3/2022    Procedure: COLONOSCOPY;  Surgeon: Shan Jean III, MD;  Location: [facility];  Service: Endoscopy;  Laterality: N/A;    COLONOSCOPY N/A 3/16/2024    Procedure: COLONOSCOPY;  Surgeon: ALEKSANDER Ramos MD;  Location: [facility];  Service: Endoscopy;  Laterality: N/A;    COLONOSCOPY WITH FECAL MICROBIOTA TRANSFER N/A 3/21/2023    Procedure: COLONOSCOPY, WITH FECAL MICROBIOTA TRANSFER;  Surgeon: David Millan MD;  Location: [facility];  Service: Endoscopy;  Laterality: N/A;    ESOPHAGOGASTRODUODENOSCOPY N/A 4/24/2023    Procedure: EGD (ESOPHAGOGASTRODUODENOSCOPY);  Surgeon: Shan Jean III, MD;  Location: [facility];  Service: Endoscopy;  Laterality: N/A;    ESOPHAGOGASTRODUODENOSCOPY N/A 6/5/2024    Procedure: EGD (ESOPHAGOGASTRODUODENOSCOPY);  Surgeon: Eliot  Odalis RIDER MD;  Location: [facility];  Service: Endoscopy;  Laterality: N/A;    FLEXIBLE SIGMOIDOSCOPY N/A 6/5/2024    Procedure: SIGMOIDOSCOPY, FLEXIBLE;  Surgeon: Odalis Mccabe MD;  Location: [facility] END[facility];  Service: Endoscopy;  Laterality: N/A;    FLEXIBLE SIGMOIDOSCOPY N/A 7/16/2024    Procedure: SIGMOIDOSCOPY, FLEXIBLE;  Surgeon: Shan Jean III, MD;  Location: [facility];  Service: Endoscopy;  Laterality: N/A;    FLEXIBLE SIGMOIDOSCOPY N/A 8/13/2024    Procedure: SIGMOIDOSCOPY, FLEXIBLE;  Surgeon: Shan Jean III, MD;  Location: [facility] END[facility];  Service: Endoscopy;  Laterality: N/A;    GANGLION CYST EXCISION Left 1992    UMBILICAL HERNIA REPAIR  2011    with mesh       Family History   Problem Relation Name Age of Onset    Asthma Mother         Review of patient's allergies indicates:  No Known Allergies    Current Facility-Administered Medications on File Prior to Encounter   Medication Dose Route Frequency Provider Last Rate Last Admin    lactated ringers infusion   Intravenous Continuous David Millan MD 75 mL/hr at 03/21/23 1252 New Bag at 03/21/23 1252     Current Outpatient Medications on File Prior to Encounter   Medication Sig Dispense Refill    busPIRone (BUSPAR) 15 MG tablet Take 1 tablet (15 mg total) by mouth 3 (three) times daily. 90 tablet 5    colchicine (COLCRYS) 0.6 mg tablet Take 1 tablet (0.6 mg total) by mouth 2 (two) times daily. 180 tablet 0    hyoscyamine (LEVBID) 0.375 mg Tb12 Take 0.375 mg by mouth 2 (two) times daily.      LORazepam (ATIVAN) 0.5 MG tablet Take 1 tablet (0.5 mg total) by mouth every 6 (six) hours as needed for Anxiety. 90 tablet 5    metoprolol succinate (TOPROL-XL) 50 MG 24 hr tablet Take 1 tablet (50 mg total) by mouth once daily. 90 tablet 1    pantoprazole (PROTONIX) 40 MG tablet Take 40 mg by mouth 2 (two) times daily.      predniSONE (DELTASONE) 10 MG tablet Take 4 tablets (40 mg total) by mouth once daily for 7 days, THEN 3 tablets (30 mg total)  "once daily for 7 days, THEN 2 tablets (20 mg total) once daily for 7 days, THEN 1 tablet (10 mg total) once daily for 7 days. 70 tablet 0    promethazine (PHENERGAN) 25 MG tablet Take 25 mg by mouth 4 (four) times daily as needed for Nausea.      sertraline (ZOLOFT) 100 MG tablet Take 1 tablet (100 mg total) by mouth once daily. (Patient taking differently: Take 100 mg by mouth once daily. Takes with 50 mg) 90 tablet 1    sertraline (ZOLOFT) 50 MG tablet Take 1 tablet (50 mg total) by mouth once daily. (Patient taking differently: Take 50 mg by mouth once daily. Takes with 100 mg) 90 tablet 1    simvastatin (ZOCOR) 20 MG tablet Take 1 tablet (20 mg total) by mouth every evening. 90 tablet 1    zolpidem (AMBIEN) 10 mg Tab Take 1 tablet (10 mg total) by mouth nightly as needed (insomnia). 30 tablet 2    ergocalciferol (VITAMIN D2) 50,000 unit Cap Take 1 capsule (50,000 Units total) by mouth every 7 days. (Patient not taking: Reported on 2024) 12 capsule 1    HYDROcodone-acetaminophen (NORCO) 5-325 mg per tablet Take 1 tablet by mouth every 6 (six) hours as needed for Pain. (Patient not taking: Reported on 2024) 12 tablet 0       ROS: As per HPI and below:  Pertinent items are noted in HPI.      Physical Exam:     Vitals:    24 1008 24 1102 24 1110 24 1433   BP: 123/73  131/69 106/78   BP Location: Left arm   Left arm   Patient Position:    Sitting   Pulse: 62  65 66   Resp: 18 18  16   Temp: 98.1 °F (36.7 °C)  98.4 °F (36.9 °C) 98.2 °F (36.8 °C)   TempSrc: Oral  Oral Oral   SpO2: 96%  97% 97%   Weight:       Height:           BP  Min: 106/78  Max: 148/80  Temp  Av.9 °F (36.6 °C)  Min: 97.3 °F (36.3 °C)  Max: 98.5 °F (36.9 °C)  Pulse  Av.4  Min: 55  Max: 104  Resp  Av.7  Min: 15  Max: 24  SpO2  Av.8 %  Min: 93 %  Max: 100 %  Height  Av' 5" (165.1 cm)  Min: 5' 5" (165.1 cm)  Max: 5' 5" (165.1 cm)  Weight  Av.3 kg (282 lb 15.2 oz)  Min: 127.4 kg (280 lb " 14.4 oz)  Max: 129.3 kg (285 lb)    Body mass index is 46.74 kg/m².          General:             Well developed, well nourished, no apparent distress  HEENT:              NCAT, no JVD, mucous membranes moist, EOM intact  Cardiovascular:  Regular rate and rhythm, normal S1, normal S2, No murmurs, rubs, or gallops  Respiratory:        Normal breath sounds, no wheezes, no rales, no rhonchi  Abdomen:           Bowel sounds present, non tender, non distended, no masses, no hepatojugular reflux  Extremities:        No clubbing, no cyanosis, no edema  Vascular:            2+ b/l radial.  Peripheral pulses intact.  No carotid bruits.  Neurological:      No focal deficits  Skin:                   No obvious rashes or erythema, open surgical wound of the left arm      Lab Results   Component Value Date    WBC 10.69 11/20/2024    HGB 9.9 (L) 11/20/2024    HCT 32.8 (L) 11/20/2024    MCV 89 11/20/2024     11/20/2024     Lab Results   Component Value Date    CHOL 162 11/01/2024    CHOL 128 09/25/2024    CHOL 142 10/27/2023     Lab Results   Component Value Date    HDL 48 11/01/2024    HDL 69 09/25/2024    HDL 44 10/27/2023     Lab Results   Component Value Date    LDLCALC 88.4 11/01/2024    LDLCALC 33.0 (L) 09/25/2024    LDLCALC 81.4 10/27/2023     Lab Results   Component Value Date    TRIG 128 11/01/2024    TRIG 130 09/25/2024    TRIG 83 10/27/2023     Lab Results   Component Value Date    CHOLHDL 29.6 11/01/2024    CHOLHDL 53.9 (H) 09/25/2024    CHOLHDL 31.0 10/27/2023     CMP  Recent Labs   Lab 11/20/24  0437   *   CALCIUM 9.7   ALBUMIN 3.7   PROT 6.8      K 5.0   CO2 30*      BUN 8   CREATININE 1.0   ALKPHOS 64   ALT 10   AST 12   BILITOT 0.3      Lab Results   Component Value Date    TSH 0.515 11/01/2024         Assessment and Recommendations       Diagnoses:    Open surgical wound oif the left arm    Plan:  Hydrofera blue to the left arm wound daily  Lidocaine ordered for local pain management  FU  at the OP clinic after DC Monday appointment    Complexity:    moderate

## 2024-11-20 NOTE — NURSING
Spoke with pt. this am after going back to room to change his dressing.  Per pt, he is asking that the skin to the outer wound be removed so that the areas have can fresh tissue in order to grow back together.  Pt. Reports that sev. yrs. ago he had this done to a wound on his Rt. Calf.  There is a large scarred, darkened area here but appeared to have healed very well.  I communicated his concern to Dr. Jimenes.  Dr. Jimenes is coming to see pt. Again this afternoon and  In agreement with excision of top layer.  Informed the nurse of the above and asked that she get the Lidocaine ordered for bedside procedure and place it at bedside. Consent was placed on the chart and the nurse was informed of the above.

## 2024-11-20 NOTE — PROGRESS NOTES
[facility] Medicine  Progress Note    Patient Name: Jacoby Jean-Baptiste  MRN: [id number]  Patient Class: IP- Inpatient   Admission Date: 11/17/2024  Length of Stay: 2 days  Attending Physician: Ash Choudhury MD  Primary Care Provider: Hunter Aguilar III, MD        Subjective:     Principal Problem:Ulcerative colitis        HPI:  49-year-old male with a past medical history of hypertension, non-insulin-dependent diabetes, obesity, hyperlipidemia, ulcerative colitis currently on Omvah , previous DVT on Xarelto who presents to the emergency room with reports of abdominal pain, rectal bleeding, bloody diarrhea for the last 2-3 days.  Patient sees Dr. Dr. Fraga Gastroenterology.  Denies chest pain, shortness breath, fever, generalized weakness.  Of note,He has history of recurrent C. Diff requiring fecal transplant in 2023, but C. Diff stool PCR has been repeatedly negative this year.   The ER, leukocytosis, WBCs on 11/15 was 13.7 now today 17.69, mild anemia with H&H 10.7, 34.3.  UA negative for infection, CT abdomen with evidence of active colitis.    Admit to hospital medicine for ulcerative colitis    Overview/Hospital Course:  Patient was admitted with flare of ulcerative colitis.  He was put on IV antibiotics and IV methylprednisolone.  We consulted with gastroenterology.  We also provided supportive care with antiemetics and crystalloids.    Interval History:  still feeling bad.  Vomited this morning when he ate grits.  Diarrhea continues to improve.  No fever recorded.    Review of Systems   Respiratory:  Negative for shortness of breath.    Cardiovascular:  Negative for chest pain.     Objective:     Vital Signs (Most Recent):  Temp: 97.8 °F (36.6 °C) (11/19/24 1920)  Pulse: 62 (11/19/24 1920)  Resp: 16 (11/19/24 1920)  BP: 124/74 (11/19/24 1920)  SpO2: 96 % (11/19/24 1920) Vital Signs (24h Range):  Temp:  [97.4 °F (36.3 °C)-98 °F (36.7 °C)] 97.8 °F (36.6 °C)  Pulse:  [62-72]  62  Resp:  [16-18] 16  SpO2:  [93 %-98 %] 96 %  BP: (124-136)/(60-84) 124/74     Weight: 127.4 kg (280 lb 14.4 oz)  Body mass index is 46.74 kg/m².    Intake/Output Summary (Last 24 hours) at 11/19/2024 1955  Last data filed at 11/19/2024 1258  Gross per 24 hour   Intake 800 ml   Output --   Net 800 ml         Physical Exam  Vitals reviewed.   Constitutional:       General: He is not in acute distress.     Appearance: He is ill-appearing. He is not diaphoretic.   HENT:      Mouth/Throat:      Mouth: Mucous membranes are moist.   Eyes:      General: No scleral icterus.        Right eye: No discharge.         Left eye: No discharge.   Neck:      Vascular: No JVD.   Cardiovascular:      Rate and Rhythm: Normal rate and regular rhythm.   Pulmonary:      Effort: Pulmonary effort is normal.      Breath sounds: Normal breath sounds.   Abdominal:      General: Bowel sounds are normal. There is no distension.      Palpations: Abdomen is soft.      Tenderness: There is no abdominal tenderness.   Skin:     General: Skin is warm.      Findings: No rash.   Neurological:      Mental Status: He is alert.             Significant Labs: All pertinent labs within the past 24 hours have been reviewed.    Significant Imaging:  No new imaging    Assessment/Plan:      * Ulcerative colitis flare  Currently on Omvoh for treatment of UC, started in 4/2024, but patient reports progressively worsening symptoms despite compliance with med. He was on Remicade previously, but had to be stopped due to infectious complications.     His GI doctor is already in the works to change Omvoh to Tremfya .    C diff negative.  Discontinued p.o. vancomycin.   Continue empiric IV Cipro and metronidazole.   Continue IV methylprednisolone.  IV hydration.   We advanced diet.      Chronic disease anemia  Anemia is likely due to chronic disease due to ulcerative colitis . Most recent hemoglobin and hematocrit are listed below.  Recent Labs     11/17/24  1159  11/18/24  0409 11/19/24  0450   HGB 10.7* 9.3* 9.5*   HCT 34.3* 30.6* 30.8*       Plan  - Monitor serial CBC: Daily  - Transfuse PRBC if patient becomes hemodynamically unstable, symptomatic or H/H drops below 7/21.  - Patient's anemia is currently stable  - monitor for overt bleeding    Type 2 diabetes mellitus without complication, without long-term current use of insulin  Serum glucoses are controlled on current medication regimen.  Last A1c reviewed-   Lab Results   Component Value Date    HGBA1C 5.8 11/02/2024     Glucose   Date Value Ref Range Status   11/19/2024 147 (H) 70 - 110 mg/dL Final   11/18/2024 124 (H) 70 - 110 mg/dL Final   11/17/2024 122 (H) 70 - 110 mg/dL Final     No need to get fingersticks and use PRN insulin.    Morbid obesity  Body mass index is 46.74 kg/m². Morbid obesity complicates all aspects of disease management from diagnostic modalities to treatment. Weight loss encouraged and health benefits explained to patient.           VTE Risk Mitigation (From admission, onward)           Ordered     rivaroxaban tablet 20 mg  With dinner         11/18/24 1336     IP VTE HIGH RISK PATIENT  Once         11/17/24 1818     Place sequential compression device  Until discontinued         11/17/24 1818     Reason for No Pharmacological VTE Prophylaxis  Once        Question:  Reasons:  Answer:  Active Bleeding    11/17/24 1818                    Discharge Planning   GERARDO: 11/20/2024     Code Status: Full Code   Is the patient medically ready for discharge?:     Reason for patient still in hospital (select all that apply): Patient trending condition and Treatment  Discharge Plan A: Home Health                  Ash Choudhury MD  Department of Hospital Medicine   [facility]

## 2024-11-20 NOTE — PROGRESS NOTES
[facility] Medicine  Progress Note    Patient Name: Jacoby Jean-Baptiste  MRN: [id number]  Patient Class: IP- Inpatient   Admission Date: 11/17/2024  Length of Stay: 3 days  Attending Physician: Nelly Sal MD  Primary Care Provider: Hunter Aguilar III, MD        Subjective:     Principal Problem:Ulcerative colitis        HPI:  49-year-old male with a past medical history of hypertension, non-insulin-dependent diabetes, obesity, hyperlipidemia, ulcerative colitis currently on Omvah , previous DVT on Xarelto who presents to the emergency room with reports of abdominal pain, rectal bleeding, bloody diarrhea for the last 2-3 days.  Patient sees Dr. Dr. Fraga Gastroenterology.  Denies chest pain, shortness breath, fever, generalized weakness.  Of note,He has history of recurrent C. Diff requiring fecal transplant in 2023, but C. Diff stool PCR has been repeatedly negative this year.   The ER, leukocytosis, WBCs on 11/15 was 13.7 now today 17.69, mild anemia with H&H 10.7, 34.3.  UA negative for infection, CT abdomen with evidence of active colitis.    Admit to hospital medicine for ulcerative colitis    Overview/Hospital Course:  Patient was admitted with flare of ulcerative colitis.  He was put on IV antibiotics and IV methylprednisolone.  We consulted with gastroenterology.  We also provided supportive care with antiemetics and crystalloids.    Interval History:  Patient awake alert in no acute distress.  Remains afebrile.  Denies chest pain shortness for breath.  Still complains of abdominal pain.  Appears comfortable.  States he has ambulating.  Awaiting wound care change to his left arm.  Patient seen and assessed along with charge nurse Carmen by bedside        Review of Systems   Constitutional: Negative.    HENT: Negative.     Eyes: Negative.    Respiratory: Negative.     Cardiovascular: Negative.    Gastrointestinal:  Positive for abdominal pain.   Endocrine: Negative.     Genitourinary: Negative.    Musculoskeletal: Negative.    Skin:  Positive for wound.   Allergic/Immunologic: Negative.    Neurological: Negative.    Hematological: Negative.    Psychiatric/Behavioral: Negative.     All other systems reviewed and are negative.    Objective:     Vital Signs (Most Recent):  Temp: 98.2 °F (36.8 °C) (11/20/24 1433)  Pulse: 66 (11/20/24 1433)  Resp: 16 (11/20/24 1433)  BP: 106/78 (11/20/24 1433)  SpO2: 97 % (11/20/24 1433) Vital Signs (24h Range):  Temp:  [97.3 °F (36.3 °C)-98.4 °F (36.9 °C)] 98.2 °F (36.8 °C)  Pulse:  [55-75] 66  Resp:  [16-18] 16  SpO2:  [95 %-100 %] 97 %  BP: (106-137)/(60-82) 106/78     Weight: 127.4 kg (280 lb 14.4 oz)  Body mass index is 46.74 kg/m².    Intake/Output Summary (Last 24 hours) at 11/20/2024 1446  Last data filed at 11/20/2024 1319  Gross per 24 hour   Intake 720 ml   Output 250 ml   Net 470 ml         Physical Exam  Vitals and nursing note reviewed.   Constitutional:       General: He is not in acute distress.     Appearance: He is obese.   HENT:      Head: Normocephalic and atraumatic.      Nose: Nose normal.      Mouth/Throat:      Mouth: Mucous membranes are moist.   Eyes:      Extraocular Movements: Extraocular movements intact.      Pupils: Pupils are equal, round, and reactive to light.   Cardiovascular:      Rate and Rhythm: Normal rate and regular rhythm.      Pulses: Normal pulses.      Heart sounds: Normal heart sounds.   Pulmonary:      Effort: Pulmonary effort is normal. No respiratory distress.      Breath sounds: Normal breath sounds.   Abdominal:      General: Bowel sounds are normal.      Palpations: Abdomen is soft.      Comments: Generalized abdominal tenderness   Musculoskeletal:         General: Normal range of motion.      Cervical back: Normal range of motion and neck supple.   Skin:     General: Skin is warm and dry.      Comments: Left proximal inner arm abscess site packed with dressing and covered with bandage.  No bleeding  discharge or drainage seen around bandage site   Neurological:      General: No focal deficit present.      Mental Status: He is alert and oriented to person, place, and time.      Cranial Nerves: No cranial nerve deficit.   Psychiatric:         Mood and Affect: Mood normal.             Significant Labs: All pertinent labs within the past 24 hours have been reviewed.    Significant Imaging: I have reviewed all pertinent imaging results/findings within the past 24 hours.    Assessment/Plan:      * Ulcerative colitis flare  Currently on Omvoh for treatment of UC, started in 4/2024, but patient reports progressively worsening symptoms despite compliance with med. He was on Remicade previously, but had to be stopped due to infectious complications.     His GI doctor is already in the works to change Omvoh to Tremfya .    C diff negative.  Discontinued p.o. vancomycin.   Continue empiric IV Cipro and metronidazole.   Continue IV methylprednisolone.  IV hydration.   We advanced diet.      Chronic disease anemia  Anemia is likely due to chronic disease due to ulcerative colitis . Most recent hemoglobin and hematocrit are listed below.  Recent Labs     11/17/24  1159 11/18/24  0409 11/19/24  0450   HGB 10.7* 9.3* 9.5*   HCT 34.3* 30.6* 30.8*       Plan  - Monitor serial CBC: Daily  - Transfuse PRBC if patient becomes hemodynamically unstable, symptomatic or H/H drops below 7/21.  - Patient's anemia is currently stable  - monitor for overt bleeding    Type 2 diabetes mellitus without complication, without long-term current use of insulin  Serum glucoses are controlled on current medication regimen.  Last A1c reviewed-   Lab Results   Component Value Date    HGBA1C 5.8 11/02/2024     Glucose   Date Value Ref Range Status   11/19/2024 147 (H) 70 - 110 mg/dL Final   11/18/2024 124 (H) 70 - 110 mg/dL Final   11/17/2024 122 (H) 70 - 110 mg/dL Final     No need to get fingersticks and use PRN insulin.    Morbid obesity  Body mass  index is 46.74 kg/m². Morbid obesity complicates all aspects of disease management from diagnostic modalities to treatment. Weight loss encouraged and health benefits explained to patient.           VTE Risk Mitigation (From admission, onward)           Ordered     rivaroxaban tablet 20 mg  With dinner         11/18/24 1336     IP VTE HIGH RISK PATIENT  Once         11/17/24 1818     Place sequential compression device  Until discontinued         11/17/24 1818     Reason for No Pharmacological VTE Prophylaxis  Once        Question:  Reasons:  Answer:  Active Bleeding    11/17/24 1818                    Discharge Planning   GERARDO: 11/23/2024     Code Status: Full Code   Is the patient medically ready for discharge?:     Reason for patient still in hospital (select all that apply): Patient trending condition and Treatment  Discharge Plan A: Home Health                  Nelly Sal MD  Department of Hospital Medicine   [facility]

## 2024-11-20 NOTE — ASSESSMENT & PLAN NOTE
Serum glucoses are controlled on current medication regimen.  Last A1c reviewed-   Lab Results   Component Value Date    HGBA1C 5.8 11/02/2024     Glucose   Date Value Ref Range Status   11/19/2024 147 (H) 70 - 110 mg/dL Final   11/18/2024 124 (H) 70 - 110 mg/dL Final   11/17/2024 122 (H) 70 - 110 mg/dL Final     No need to get fingersticks and use PRN insulin.

## 2024-11-20 NOTE — NURSING
Pt. Seen for Wound Care F/U.          Cleansed the Lt. Arm with NS and gauze. Patted dry with gauze. Applied Hydrofera blue moistened with NS and gently packed into wound using the wood end of cotton-tipped applicator . Covered with a transparent dressing and meredith. well.         1.1 x 0.4 x 0.1 cm  This wound undermines from 12-12 with the deepest area @ 10:00 =1.9 cm

## 2024-11-20 NOTE — SUBJECTIVE & OBJECTIVE
Interval History:  still feeling bad.  Vomited this morning when he ate grits.  Diarrhea continues to improve.  No fever recorded.    Review of Systems   Respiratory:  Negative for shortness of breath.    Cardiovascular:  Negative for chest pain.     Objective:     Vital Signs (Most Recent):  Temp: 97.8 °F (36.6 °C) (11/19/24 1920)  Pulse: 62 (11/19/24 1920)  Resp: 16 (11/19/24 1920)  BP: 124/74 (11/19/24 1920)  SpO2: 96 % (11/19/24 1920) Vital Signs (24h Range):  Temp:  [97.4 °F (36.3 °C)-98 °F (36.7 °C)] 97.8 °F (36.6 °C)  Pulse:  [62-72] 62  Resp:  [16-18] 16  SpO2:  [93 %-98 %] 96 %  BP: (124-136)/(60-84) 124/74     Weight: 127.4 kg (280 lb 14.4 oz)  Body mass index is 46.74 kg/m².    Intake/Output Summary (Last 24 hours) at 11/19/2024 1955  Last data filed at 11/19/2024 1258  Gross per 24 hour   Intake 800 ml   Output --   Net 800 ml         Physical Exam  Vitals reviewed.   Constitutional:       General: He is not in acute distress.     Appearance: He is ill-appearing. He is not diaphoretic.   HENT:      Mouth/Throat:      Mouth: Mucous membranes are moist.   Eyes:      General: No scleral icterus.        Right eye: No discharge.         Left eye: No discharge.   Neck:      Vascular: No JVD.   Cardiovascular:      Rate and Rhythm: Normal rate and regular rhythm.   Pulmonary:      Effort: Pulmonary effort is normal.      Breath sounds: Normal breath sounds.   Abdominal:      General: Bowel sounds are normal. There is no distension.      Palpations: Abdomen is soft.      Tenderness: There is no abdominal tenderness.   Skin:     General: Skin is warm.      Findings: No rash.   Neurological:      Mental Status: He is alert.             Significant Labs: All pertinent labs within the past 24 hours have been reviewed.    Significant Imaging:  No new imaging

## 2024-11-20 NOTE — ASSESSMENT & PLAN NOTE
Currently on Omvoh for treatment of UC, started in 4/2024, but patient reports progressively worsening symptoms despite compliance with med. He was on Remicade previously, but had to be stopped due to infectious complications.     His GI doctor is already in the works to change Omvoh to Tremfya .    C diff negative.  Discontinued p.o. vancomycin.   Continue empiric IV Cipro and metronidazole.   Continue IV methylprednisolone.  IV hydration.   We advanced diet.

## 2024-11-20 NOTE — HOSPITAL COURSE
Patient was admitted with flare of ulcerative colitis.  He was put on IV antibiotics and IV methylprednisolone.  We consulted with gastroenterology.  We also provided supportive care with antiemetics and crystalloids.  Patient continued on IV therapies for a couple of days and ultimately was able to be transferred to oral antibiotics on discharge per Briana's recommendation.  They had him in the works for switching his ulcerative colitis medication and will be followed up in the outpatient setting.  Patient was agreeable and thinks this plan is reasonable.

## 2024-11-20 NOTE — PROCEDURES
"Debridement    Date/Time: 11/20/2024 4:05 PM    Performed by: Paul Jimenes DO  Authorized by: Paul Jimenes DO    Time out: Immediately prior to procedure a "time out" was called to verify the correct patient, procedure, equipment, support staff and site/side marked as required.    Consent Done?:  Yes (Written)    Preparation: Patient was prepped and draped with aseptic technique    Local anesthesia used?: Yes    Anesthesia:  Local infiltration  Local anesthetic:  Lidocaine 2% topical gel  Anesthetic total (ml):  6    Debridement - 1st Wound - General Location: left arm.    Type of Debridement:  Excisional       Length (cm):  1       Width (cm):  0.4       Depth (cm):  0.1       Area (sq cm):  0.4       Percent Debrided (%):  100       Total Area Debrided (sq cm):  0.4    Depth of debridement:  Subcutaneous tissue    Tissue debrided:  Dermis, Epidermis and Subcutaneous    Devitalized tissue debrided:  Biofilm, Exudate, Fibrin, Necrotic/Eschar and Slough    Instruments:  Curette  Bleeding:  Minimal  Hemostasis Achieved: Yes  Method Used:  Pressure  Patient tolerance:  Patient tolerated the procedure well with no immediate complications     Redressed with hydrofera blue  "

## 2024-11-20 NOTE — ASSESSMENT & PLAN NOTE
Anemia is likely due to chronic disease due to ulcerative colitis . Most recent hemoglobin and hematocrit are listed below.  Recent Labs     11/17/24  1159 11/18/24  0409 11/19/24  0450   HGB 10.7* 9.3* 9.5*   HCT 34.3* 30.6* 30.8*       Plan  - Monitor serial CBC: Daily  - Transfuse PRBC if patient becomes hemodynamically unstable, symptomatic or H/H drops below 7/21.  - Patient's anemia is currently stable  - monitor for overt bleeding

## 2024-11-20 NOTE — SUBJECTIVE & OBJECTIVE
Interval History:  Patient awake alert in no acute distress.  Remains afebrile.  Denies chest pain shortness for breath.  Still complains of abdominal pain.  Appears comfortable.  States he has ambulating.  Awaiting wound care change to his left arm.  Patient seen and assessed along with charge nurse Carmen by bedside        Review of Systems   Constitutional: Negative.    HENT: Negative.     Eyes: Negative.    Respiratory: Negative.     Cardiovascular: Negative.    Gastrointestinal:  Positive for abdominal pain.   Endocrine: Negative.    Genitourinary: Negative.    Musculoskeletal: Negative.    Skin:  Positive for wound.   Allergic/Immunologic: Negative.    Neurological: Negative.    Hematological: Negative.    Psychiatric/Behavioral: Negative.     All other systems reviewed and are negative.    Objective:     Vital Signs (Most Recent):  Temp: 98.2 °F (36.8 °C) (11/20/24 1433)  Pulse: 66 (11/20/24 1433)  Resp: 16 (11/20/24 1433)  BP: 106/78 (11/20/24 1433)  SpO2: 97 % (11/20/24 1433) Vital Signs (24h Range):  Temp:  [97.3 °F (36.3 °C)-98.4 °F (36.9 °C)] 98.2 °F (36.8 °C)  Pulse:  [55-75] 66  Resp:  [16-18] 16  SpO2:  [95 %-100 %] 97 %  BP: (106-137)/(60-82) 106/78     Weight: 127.4 kg (280 lb 14.4 oz)  Body mass index is 46.74 kg/m².    Intake/Output Summary (Last 24 hours) at 11/20/2024 1446  Last data filed at 11/20/2024 1319  Gross per 24 hour   Intake 720 ml   Output 250 ml   Net 470 ml         Physical Exam  Vitals and nursing note reviewed.   Constitutional:       General: He is not in acute distress.     Appearance: He is obese.   HENT:      Head: Normocephalic and atraumatic.      Nose: Nose normal.      Mouth/Throat:      Mouth: Mucous membranes are moist.   Eyes:      Extraocular Movements: Extraocular movements intact.      Pupils: Pupils are equal, round, and reactive to light.   Cardiovascular:      Rate and Rhythm: Normal rate and regular rhythm.      Pulses: Normal pulses.      Heart sounds: Normal  heart sounds.   Pulmonary:      Effort: Pulmonary effort is normal. No respiratory distress.      Breath sounds: Normal breath sounds.   Abdominal:      General: Bowel sounds are normal.      Palpations: Abdomen is soft.      Comments: Generalized abdominal tenderness   Musculoskeletal:         General: Normal range of motion.      Cervical back: Normal range of motion and neck supple.   Skin:     General: Skin is warm and dry.      Comments: Left proximal inner arm abscess site packed with dressing and covered with bandage.  No bleeding discharge or drainage seen around bandage site   Neurological:      General: No focal deficit present.      Mental Status: He is alert and oriented to person, place, and time.      Cranial Nerves: No cranial nerve deficit.   Psychiatric:         Mood and Affect: Mood normal.             Significant Labs: All pertinent labs within the past 24 hours have been reviewed.    Significant Imaging: I have reviewed all pertinent imaging results/findings within the past 24 hours.

## 2024-11-20 NOTE — NURSING
Pt seen for F/U Wound Care visit.    Lidocaine Gel ordered earlier today to be at bedside prior to procedure.  Applied Lidocaine Gel on gauze and allowed for absorption.  Dr. Jimenes at bedside and both verbal and written consents were obtained.     Excisional debridement by Dr. Jimenes and pt. reports pain and will call nurse to ask for something IV.     Cleansed the Lt. Arm with NS and gauze. Patted dry with gauze. Applied Hydrofera blue moistened with NS. and covered with a transparent dressing.Nurse at bedside to give Iv pain med.  I will F/U with pt in am for another dressing change. .

## 2024-11-20 NOTE — ASSESSMENT & PLAN NOTE
Body mass index is 46.74 kg/m². Morbid obesity complicates all aspects of disease management from diagnostic modalities to treatment. Weight loss encouraged and health benefits explained to patient.

## 2024-11-20 NOTE — PLAN OF CARE
Problem: Sepsis/Septic Shock  Goal: Absence of Bleeding  Outcome: Progressing  Goal: Blood Glucose Level Within Targeted Range  Outcome: Progressing  Goal: Absence of Infection Signs and Symptoms  Outcome: Progressing

## 2024-11-21 ENCOUNTER — TELEPHONE (OUTPATIENT)
Dept: FAMILY MEDICINE | Facility: CLINIC | Age: 49
End: 2024-11-21
Payer: COMMERCIAL

## 2024-11-21 ENCOUNTER — DOCUMENT SCAN (OUTPATIENT)
Dept: HOME HEALTH SERVICES | Facility: HOSPITAL | Age: 49
End: 2024-11-21
Payer: COMMERCIAL

## 2024-11-21 VITALS
SYSTOLIC BLOOD PRESSURE: 134 MMHG | HEART RATE: 61 BPM | RESPIRATION RATE: 17 BRPM | BODY MASS INDEX: 46.8 KG/M2 | OXYGEN SATURATION: 99 % | DIASTOLIC BLOOD PRESSURE: 86 MMHG | HEIGHT: 65 IN | TEMPERATURE: 98 F | WEIGHT: 280.88 LBS

## 2024-11-21 LAB
CALPROTECTIN STL-MCNT: 1050 UG/G (ref 0–120)
GIARDIA ANTIGEN - EIA: NEGATIVE
GIARDIA LAMBLIA AG SOURCE: NORMAL

## 2024-11-21 PROCEDURE — 63600175 PHARM REV CODE 636 W HCPCS: Performed by: NURSE PRACTITIONER

## 2024-11-21 PROCEDURE — 63600175 PHARM REV CODE 636 W HCPCS: Performed by: INTERNAL MEDICINE

## 2024-11-21 PROCEDURE — 25000003 PHARM REV CODE 250: Performed by: INTERNAL MEDICINE

## 2024-11-21 PROCEDURE — 25000003 PHARM REV CODE 250: Performed by: NURSE PRACTITIONER

## 2024-11-21 PROCEDURE — 25000003 PHARM REV CODE 250: Performed by: HOSPITALIST

## 2024-11-21 RX ORDER — PREDNISONE 10 MG/1
TABLET ORAL
Qty: 33 TABLET | Refills: 0 | Status: ON HOLD | OUTPATIENT
Start: 2024-11-21 | End: 2024-12-11

## 2024-11-21 RX ORDER — METRONIDAZOLE 500 MG/1
500 TABLET ORAL EVERY 8 HOURS
Qty: 6 TABLET | Refills: 0 | Status: ON HOLD | OUTPATIENT
Start: 2024-11-21

## 2024-11-21 RX ORDER — OXYCODONE AND ACETAMINOPHEN 7.5; 325 MG/1; MG/1
1 TABLET ORAL EVERY 8 HOURS PRN
Qty: 15 TABLET | Refills: 0 | Status: ON HOLD | OUTPATIENT
Start: 2024-11-21 | End: 2024-12-21

## 2024-11-21 RX ORDER — CIPROFLOXACIN 500 MG/1
500 TABLET ORAL EVERY 12 HOURS
Qty: 4 TABLET | Refills: 0 | Status: ON HOLD | OUTPATIENT
Start: 2024-11-21

## 2024-11-21 RX ADMIN — METOPROLOL SUCCINATE 50 MG: 50 TABLET, FILM COATED, EXTENDED RELEASE ORAL at 08:11

## 2024-11-21 RX ADMIN — HYDROCODONE BITARTRATE AND ACETAMINOPHEN 1 TABLET: 5; 325 TABLET ORAL at 01:11

## 2024-11-21 RX ADMIN — HYDROCODONE BITARTRATE AND ACETAMINOPHEN 1 TABLET: 5; 325 TABLET ORAL at 08:11

## 2024-11-21 RX ADMIN — METHYLPREDNISOLONE SODIUM SUCCINATE 30 MG: 40 INJECTION, POWDER, FOR SOLUTION INTRAMUSCULAR; INTRAVENOUS at 08:11

## 2024-11-21 RX ADMIN — HYDROMORPHONE HYDROCHLORIDE 0.5 MG: 1 INJECTION, SOLUTION INTRAMUSCULAR; INTRAVENOUS; SUBCUTANEOUS at 09:11

## 2024-11-21 RX ADMIN — BUSPIRONE HYDROCHLORIDE 15 MG: 5 TABLET ORAL at 08:11

## 2024-11-21 RX ADMIN — METRONIDAZOLE 500 MG: 500 INJECTION, SOLUTION INTRAVENOUS at 04:11

## 2024-11-21 RX ADMIN — ONDANSETRON 4 MG: 2 INJECTION INTRAMUSCULAR; INTRAVENOUS at 06:11

## 2024-11-21 RX ADMIN — HYDROMORPHONE HYDROCHLORIDE 0.5 MG: 1 INJECTION, SOLUTION INTRAMUSCULAR; INTRAVENOUS; SUBCUTANEOUS at 06:11

## 2024-11-21 RX ADMIN — CIPROFLOXACIN 400 MG: 2 INJECTION, SOLUTION INTRAVENOUS at 09:11

## 2024-11-21 RX ADMIN — FAMOTIDINE 20 MG: 20 TABLET ORAL at 08:11

## 2024-11-21 RX ADMIN — SERTRALINE HYDROCHLORIDE 150 MG: 50 TABLET ORAL at 08:11

## 2024-11-21 RX ADMIN — ATORVASTATIN CALCIUM 10 MG: 10 TABLET, FILM COATED ORAL at 08:11

## 2024-11-21 NOTE — NURSING
Pt. seen for Wound Care F/U.    Pt. had excisional debridement yesterday at bedside by Dr. Jimenes.     The only current undermined area now is from 3-9:00.          Cleansed the Lt. Arm open wound with NS and gauze. Patted dry with gauze. Using a Sterile cotton-tipped Applicator, Gently packed one Strip of Hydrofera Blue moistened with NS into the undermined area and wound bed. Covered with transparent dressing. Jose. Well.     Pt. reports that when d/c'd from hospital, that HH nurse will be performing Wound Care.  He has a F/U appt[facility] with Dr. Jimenes.

## 2024-11-21 NOTE — PLAN OF CARE
Patient cleared for discharge by case management to home with home health but patient left prior to case management clearance.        11/21/24 1421   Final Note   Assessment Type Final Discharge Note   Anticipated Discharge Disposition Home-Health   Hospital Resources/Appts/Education Provided Appointments scheduled and added to AVS

## 2024-11-21 NOTE — TELEPHONE ENCOUNTER
"Discharge Information     Discharge Date:  11/21/2024          Primary Discharge Diagnosis:  Ulcerative colitis flare           How patient is feeling since discharge from the hospital?  States he is feeling better          Medication & Order Review     Discharge Medication Review:    Medication reconciliation performed No   If no, state reason why not performed: N/A       Did patient have any difficulty/problems filling prescriptions?  No           If yes, state reason and steps taken to assist in resolving issue: N/A     Does patient have any questions regarding medications?  No           If yes, state question and steps taken to answer question:  N/A    Was Home Health and/or any equipment ordered for patient upon discharge?  No          Home Health No   If yes, has home health contacted patient and/or initiated services? N/A   Name of Home Health Agency N/A   Durable Medical Equipment (DME)  No (Example: walker, wheelchair, nebulizer)   If yes, has the DME provider contacted patient and delivered equipment? N/A    DME Company N/A     Red Flag Review     Was patient educated on "red flags" or things to watch for?  No       If yes:  "Red flags" patient was told to watch for:                                                  Other:              Is patient experiencing any red flags today (or any of these symptoms) (List examples of red flags specifically)?  No       Notes:                      If no:    Educated the patient on 3 "red flags" to watch for:                                                  Other:                  Is patient experiencing any red flags today (or any of these symptoms) (List examples of red flags specifically)?  No      Notes:        Patient Education & Follow Up               Phone number patient will call if having any questions or problems:  Patient was able to state the phone number for [facility] accurately.    Appointment scheduled?  Yes      Follow-up/transition of care " appointment, including the date/time and location of your appointment:  Patient was able to state the follow-up appointment correctly.        Notes:            Provided patient with date, time and location of follow-up appointment if they do not have it:  Yes      Rescheduled transition of care appointment if the patient has a conflict:    Appointment re-scheduled?  No        Patient informed of this appointment?  No      Notes:            3 questions patient would like to ask physician during appointment:

## 2024-11-22 ENCOUNTER — PATIENT OUTREACH (OUTPATIENT)
Dept: ADMINISTRATIVE | Facility: OTHER | Age: 49
End: 2024-11-22
Payer: COMMERCIAL

## 2024-11-22 NOTE — PROGRESS NOTES
This Community Health Worker (CHW) completed Social Determinant of Health (SDOH)  Questionnaire with patient/caregiver via telephone today.    Patient denied any SDOH needs at this time.

## 2024-11-25 ENCOUNTER — OFFICE VISIT (OUTPATIENT)
Dept: WOUND CARE | Facility: HOSPITAL | Age: 49
End: 2024-11-25
Attending: FAMILY MEDICINE
Payer: COMMERCIAL

## 2024-11-25 VITALS
HEART RATE: 85 BPM | SYSTOLIC BLOOD PRESSURE: 127 MMHG | RESPIRATION RATE: 17 BRPM | DIASTOLIC BLOOD PRESSURE: 91 MMHG | TEMPERATURE: 98 F

## 2024-11-25 DIAGNOSIS — S41.102D OPEN WOUND OF LEFT UPPER ARM, SUBSEQUENT ENCOUNTER: Primary | ICD-10-CM

## 2024-11-25 DIAGNOSIS — L02.818 CUTANEOUS ABSCESS OF OTHER SITE: ICD-10-CM

## 2024-11-25 DIAGNOSIS — R52 ACUTE PAIN: ICD-10-CM

## 2024-11-25 PROCEDURE — 99213 OFFICE O/P EST LOW 20 MIN: CPT | Mod: PN | Performed by: FAMILY MEDICINE

## 2024-11-25 NOTE — PROGRESS NOTES
Wound Care Progress Note    Subjective:       Patient ID: Jacoby Jean-Baptiste is a 49 y.o. male.    Chief Complaint: No chief complaint on file.    HPI  Pt seen in clinic for a FU visit for post hospital open left arm wound. Wound is clean today, pt has no new complaints today  Review of Systems   Skin:  Positive for wound.        Left arm open wound   All other systems reviewed and are negative.      Objective:        Physical Exam  Vitals and nursing note reviewed.   Constitutional:       General: He is not in acute distress.     Appearance: Normal appearance.   Skin:     General: Skin is warm and dry.      Findings: Lesion present.      Comments: Left arm surgical wound, see wound care assessment documentation in chart review scanned under the media tab   Neurological:      General: No focal deficit present.      Mental Status: He is alert.   Psychiatric:         Judgment: Judgment normal.       There were no vitals filed for this visit.    Assessment:           ICD-10-CM ICD-9-CM   1. Open wound of left upper arm, subsequent encounter  S41.102D V58.89     880.03   2. Cutaneous abscess of other site  L02.818 682.8   3. Acute pain  R52 338.19                Plan:                  Diagnoses and all orders for this visit:    Open wound of left upper arm, subsequent encounter    Cutaneous abscess of other site    Acute pain      See wound care instructions provided separately

## 2024-11-26 ENCOUNTER — OFFICE VISIT (OUTPATIENT)
Dept: OPHTHALMOLOGY | Facility: CLINIC | Age: 49
End: 2024-11-26
Payer: COMMERCIAL

## 2024-11-26 DIAGNOSIS — D31.32 CHOROIDAL NEVUS, LEFT EYE: ICD-10-CM

## 2024-11-26 DIAGNOSIS — E11.9 TYPE 2 DIABETES MELLITUS WITHOUT RETINOPATHY: Primary | ICD-10-CM

## 2024-11-26 PROCEDURE — 1159F MED LIST DOCD IN RCRD: CPT | Mod: CPTII,S$GLB,, | Performed by: OPHTHALMOLOGY

## 2024-11-26 PROCEDURE — 2023F DILAT RTA XM W/O RTNOPTHY: CPT | Mod: CPTII,S$GLB,, | Performed by: OPHTHALMOLOGY

## 2024-11-26 PROCEDURE — 99999 PR PBB SHADOW E&M-EST. PATIENT-LVL III: CPT | Mod: PBBFAC,,, | Performed by: OPHTHALMOLOGY

## 2024-11-26 PROCEDURE — 3044F HG A1C LEVEL LT 7.0%: CPT | Mod: CPTII,S$GLB,, | Performed by: OPHTHALMOLOGY

## 2024-11-26 PROCEDURE — 3066F NEPHROPATHY DOC TX: CPT | Mod: CPTII,S$GLB,, | Performed by: OPHTHALMOLOGY

## 2024-11-26 PROCEDURE — 92014 COMPRE OPH EXAM EST PT 1/>: CPT | Mod: S$GLB,,, | Performed by: OPHTHALMOLOGY

## 2024-11-26 PROCEDURE — 1160F RVW MEDS BY RX/DR IN RCRD: CPT | Mod: CPTII,S$GLB,, | Performed by: OPHTHALMOLOGY

## 2024-11-26 PROCEDURE — 3061F NEG MICROALBUMINURIA REV: CPT | Mod: CPTII,S$GLB,, | Performed by: OPHTHALMOLOGY

## 2024-11-26 PROCEDURE — 92134 CPTRZ OPH DX IMG PST SGM RTA: CPT | Mod: S$GLB,,, | Performed by: OPHTHALMOLOGY

## 2024-11-26 NOTE — PROGRESS NOTES
HPI    DLS: 3/5/2024 annal DM   Pt states DM is stable.      Denies pain/ FOL/ floaters.     No gtts.         Hemoglobin A1C       Date                     Value               Ref Range             Status                11/02/2024               5.8                 4.5 - 6.2 %           Final          .         09/11/2024               4.9                 4.5 - 6.2 %           Final                  06/06/2024               5.2                 4.5 - 6.2 %           Final                Last edited by Gina Ocasio on 11/26/2024 10:06 AM.            Assessment /Plan     For exam results, see Encounter Report.    Type 2 diabetes mellitus without retinopathy    Choroidal nevus, left eye      1. Type 2 diabetes mellitus without retinopathy (Primary)  Diabetes without retinopathy, discussed with patient importance of glucose control and follow up.  Patient voices understanding.    2. Choroidal nevus, left eye  Flat, no concerning features  Observe  OCT mac today for baseline    F/u 1 year, routine exam

## 2024-11-27 ENCOUNTER — HOSPITAL ENCOUNTER (EMERGENCY)
Facility: HOSPITAL | Age: 49
Discharge: HOME | End: 2024-11-27
Attending: STUDENT IN AN ORGANIZED HEALTH CARE EDUCATION/TRAINING PROGRAM
Payer: COMMERCIAL

## 2024-11-27 VITALS
RESPIRATION RATE: 16 BRPM | DIASTOLIC BLOOD PRESSURE: 73 MMHG | WEIGHT: 280 LBS | SYSTOLIC BLOOD PRESSURE: 147 MMHG | TEMPERATURE: 98 F | HEART RATE: 78 BPM | OXYGEN SATURATION: 98 % | HEIGHT: 65 IN | BODY MASS INDEX: 46.65 KG/M2

## 2024-11-27 DIAGNOSIS — R07.9 CHEST PAIN: ICD-10-CM

## 2024-11-27 LAB
ALBUMIN SERPL BCP-MCNC: 3.8 G/DL (ref 3.5–5.2)
ALP SERPL-CCNC: 47 U/L (ref 55–135)
ALT SERPL W/O P-5'-P-CCNC: 17 U/L (ref 10–44)
ANION GAP SERPL CALC-SCNC: 7 MMOL/L (ref 8–16)
AST SERPL-CCNC: 20 U/L (ref 10–40)
BASOPHILS # BLD AUTO: 0.01 K/UL (ref 0–0.2)
BASOPHILS NFR BLD: 0.1 % (ref 0–1.9)
BILIRUB SERPL-MCNC: 0.4 MG/DL (ref 0.1–1)
BNP SERPL-MCNC: 95 PG/ML (ref 0–99)
BUN SERPL-MCNC: 15 MG/DL (ref 6–20)
CALCIUM SERPL-MCNC: 8.7 MG/DL (ref 8.7–10.5)
CHLORIDE SERPL-SCNC: 101 MMOL/L (ref 95–110)
CO2 SERPL-SCNC: 28 MMOL/L (ref 23–29)
CREAT SERPL-MCNC: 1.1 MG/DL (ref 0.5–1.4)
DIFFERENTIAL METHOD BLD: ABNORMAL
EOSINOPHIL # BLD AUTO: 0 K/UL (ref 0–0.5)
EOSINOPHIL NFR BLD: 0.2 % (ref 0–8)
ERYTHROCYTE [DISTWIDTH] IN BLOOD BY AUTOMATED COUNT: 17.3 % (ref 11.5–14.5)
EST. GFR  (NO RACE VARIABLE): >60 ML/MIN/1.73 M^2
GLUCOSE SERPL-MCNC: 164 MG/DL (ref 70–110)
HCT VFR BLD AUTO: 34.8 % (ref 40–54)
HGB BLD-MCNC: 10.6 G/DL (ref 14–18)
IMM GRANULOCYTES # BLD AUTO: 0.09 K/UL (ref 0–0.04)
IMM GRANULOCYTES NFR BLD AUTO: 0.8 % (ref 0–0.5)
INFLUENZA A, MOLECULAR: NEGATIVE
INFLUENZA B, MOLECULAR: NEGATIVE
LYMPHOCYTES # BLD AUTO: 0.6 K/UL (ref 1–4.8)
LYMPHOCYTES NFR BLD: 5.5 % (ref 18–48)
MAGNESIUM SERPL-MCNC: 1.4 MG/DL (ref 1.6–2.6)
MCH RBC QN AUTO: 28 PG (ref 27–31)
MCHC RBC AUTO-ENTMCNC: 30.5 G/DL (ref 32–36)
MCV RBC AUTO: 92 FL (ref 82–98)
MONOCYTES # BLD AUTO: 0.2 K/UL (ref 0.3–1)
MONOCYTES NFR BLD: 2 % (ref 4–15)
NEUTROPHILS # BLD AUTO: 9.9 K/UL (ref 1.8–7.7)
NEUTROPHILS NFR BLD: 91.4 % (ref 38–73)
NRBC BLD-RTO: 0 /100 WBC
PLATELET # BLD AUTO: 239 K/UL (ref 150–450)
PMV BLD AUTO: 10.7 FL (ref 9.2–12.9)
POTASSIUM SERPL-SCNC: 4.4 MMOL/L (ref 3.5–5.1)
PROT SERPL-MCNC: 6.6 G/DL (ref 6–8.4)
RBC # BLD AUTO: 3.78 M/UL (ref 4.6–6.2)
SARS-COV-2 RDRP RESP QL NAA+PROBE: NEGATIVE
SODIUM SERPL-SCNC: 136 MMOL/L (ref 136–145)
SPECIMEN SOURCE: NORMAL
TROPONIN I SERPL HS-MCNC: 4 PG/ML (ref 0–14.9)
TROPONIN I SERPL HS-MCNC: 4.2 PG/ML (ref 0–14.9)
WBC # BLD AUTO: 10.84 K/UL (ref 3.9–12.7)

## 2024-11-27 PROCEDURE — 94640 AIRWAY INHALATION TREATMENT: CPT

## 2024-11-27 PROCEDURE — 25000242 PHARM REV CODE 250 ALT 637 W/ HCPCS: Performed by: STUDENT IN AN ORGANIZED HEALTH CARE EDUCATION/TRAINING PROGRAM

## 2024-11-27 PROCEDURE — 94761 N-INVAS EAR/PLS OXIMETRY MLT: CPT

## 2024-11-27 PROCEDURE — 93005 ELECTROCARDIOGRAM TRACING: CPT | Performed by: INTERNAL MEDICINE

## 2024-11-27 PROCEDURE — 80053 COMPREHEN METABOLIC PANEL: CPT | Performed by: NURSE PRACTITIONER

## 2024-11-27 PROCEDURE — 83735 ASSAY OF MAGNESIUM: CPT | Performed by: NURSE PRACTITIONER

## 2024-11-27 PROCEDURE — 25000003 PHARM REV CODE 250: Performed by: STUDENT IN AN ORGANIZED HEALTH CARE EDUCATION/TRAINING PROGRAM

## 2024-11-27 PROCEDURE — 63600175 PHARM REV CODE 636 W HCPCS: Performed by: STUDENT IN AN ORGANIZED HEALTH CARE EDUCATION/TRAINING PROGRAM

## 2024-11-27 PROCEDURE — 96365 THER/PROPH/DIAG IV INF INIT: CPT

## 2024-11-27 PROCEDURE — 99285 EMERGENCY DEPT VISIT HI MDM: CPT | Mod: 25

## 2024-11-27 PROCEDURE — 25500020 PHARM REV CODE 255: Performed by: STUDENT IN AN ORGANIZED HEALTH CARE EDUCATION/TRAINING PROGRAM

## 2024-11-27 PROCEDURE — 87635 SARS-COV-2 COVID-19 AMP PRB: CPT | Performed by: NURSE PRACTITIONER

## 2024-11-27 PROCEDURE — 87502 INFLUENZA DNA AMP PROBE: CPT | Performed by: NURSE PRACTITIONER

## 2024-11-27 PROCEDURE — 84484 ASSAY OF TROPONIN QUANT: CPT | Mod: 91 | Performed by: NURSE PRACTITIONER

## 2024-11-27 PROCEDURE — 83880 ASSAY OF NATRIURETIC PEPTIDE: CPT | Performed by: NURSE PRACTITIONER

## 2024-11-27 PROCEDURE — 85025 COMPLETE CBC W/AUTO DIFF WBC: CPT | Performed by: NURSE PRACTITIONER

## 2024-11-27 PROCEDURE — 96366 THER/PROPH/DIAG IV INF ADDON: CPT

## 2024-11-27 RX ORDER — IPRATROPIUM BROMIDE AND ALBUTEROL SULFATE 2.5; .5 MG/3ML; MG/3ML
3 SOLUTION RESPIRATORY (INHALATION) ONCE
Status: COMPLETED | OUTPATIENT
Start: 2024-11-27 | End: 2024-11-27

## 2024-11-27 RX ORDER — MAGNESIUM SULFATE HEPTAHYDRATE 40 MG/ML
2 INJECTION, SOLUTION INTRAVENOUS ONCE
Status: COMPLETED | OUTPATIENT
Start: 2024-11-27 | End: 2024-11-27

## 2024-11-27 RX ORDER — ACETAMINOPHEN 500 MG
1000 TABLET ORAL
Status: COMPLETED | OUTPATIENT
Start: 2024-11-27 | End: 2024-11-27

## 2024-11-27 RX ADMIN — IOHEXOL 100 ML: 350 INJECTION, SOLUTION INTRAVENOUS at 01:11

## 2024-11-27 RX ADMIN — ACETAMINOPHEN 1000 MG: 500 TABLET, FILM COATED ORAL at 02:11

## 2024-11-27 RX ADMIN — IPRATROPIUM BROMIDE AND ALBUTEROL SULFATE 3 ML: .5; 3 SOLUTION RESPIRATORY (INHALATION) at 01:11

## 2024-11-27 RX ADMIN — MAGNESIUM SULFATE HEPTAHYDRATE 2 G: 40 INJECTION, SOLUTION INTRAVENOUS at 02:11

## 2024-11-27 NOTE — DISCHARGE INSTRUCTIONS
Please return to the emergency department if you have new or worsening symptoms.  Follow up with your cardiologist for further evaluation.    Future Appointments   Date Time Provider Department Center   12/2/2024  9:30 AM Hunter Aguilar III, MD [facility]   12/4/2024  2:30 PM [facility]   12/16/2024  8:30 AM Kimberlee Orozco NP [facility] at [facility]   12/27/2024  9:40 AM Hunter Aguilar III, MD [facility] at [facility]

## 2024-11-27 NOTE — FIRST PROVIDER EVALUATION
Emergency Department TeleTriage Encounter Note      CHIEF COMPLAINT    Chief Complaint   Patient presents with    Chest Pain     Chest tightness and shortness of breath x 3 days       VITAL SIGNS   Initial Vitals [11/27/24 1138]   BP Pulse Resp Temp SpO2   (!) 155/65 78 18 98.6 °F (37 °C) 100 %      MAP       --            ALLERGIES    Review of patient's allergies indicates:  No Known Allergies    PROVIDER TRIAGE NOTE  Verbal consent for the teletriage evaluation was given by the patient at the start of the evaluation.  All efforts will be made to maintain patient's privacy during the evaluation.      This is a teletriage evaluation of a 49 y.o. male presenting to the ED with c/o chest tightness and SOB that started 2 days ago. Limited physical exam via telehealth: The patient is awake, alert, answering questions appropriately and is not in respiratory distress.  As the Teletriage provider, I performed an initial assessment and ordered appropriate labs and imaging studies, if any, to facilitate the patient's care once placed in the ED. Once a room is available, care and a full evaluation will be completed by an alternate ED provider.  Any additional orders and the final disposition will be determined by that provider.  All imaging and labs will not be followed-up by the Teletriage Team, including myself.          ORDERS  Labs Reviewed - No data to display    ED Orders (720h ago, onward)      Start Ordered     Status Ordering Provider    11/27/24 1359 11/27/24 1159  Troponin I High Sensitivity #2  Once Timed         Ordered STEVE CORCORAN    11/27/24 1159 11/27/24 1159  Magnesium  STAT         Ordered STEVE CORCORAN    11/27/24 1159 11/27/24 1159  Vital signs  Every 15 min         Ordered STEVE CORCORAN    11/27/24 1159 11/27/24 1159  Cardiac Monitoring - Adult  Continuous        Comments: Notify Physician If:    Ordered STEVE CORCORAN    11/27/24 1159 11/27/24 1159  Pulse Oximetry Continuous  Continuous          Ordered STEVE CORCORAN    11/27/24 1159 11/27/24 1159  Diet NPO  Diet effective now         Ordered STEVE CORCORAN    11/27/24 1159 11/27/24 1159  Saline lock IV  Once         Ordered STEVE CORCORAN    11/27/24 1159 11/27/24 1159  EKG 12-lead  Once        Comments: Do not perform if previously done during this visit/ triage    Ordered STEVE CORCORAN    11/27/24 1159 11/27/24 1159  CBC auto differential  STAT         Ordered STEVE CORCORAN    11/27/24 1159 11/27/24 1159  Comprehensive metabolic panel  STAT         Ordered STEVE CORCORAN    11/27/24 1159 11/27/24 1159  Troponin I High Sensitivity #1  STAT         Ordered STEVE CORCORAN    11/27/24 1159 11/27/24 1159  B-Type natriuretic peptide (BNP)  STAT         Ordered STEVE CORCORAN    11/27/24 1159 11/27/24 1159  X-Ray Chest PA And Lateral  1 time imaging         Ordered STEVE CORCORAN    11/27/24 1159 11/27/24 1159  COVID-19 Rapid Screening  STAT         Ordered STEVE CORCORAN    11/27/24 1159 11/27/24 1159  Influenza antigen Nasopharyngeal Swab  Once         Ordered STEVE CORCORAN              Virtual Visit Note: The provider triage portion of this emergency department evaluation and documentation was performed via [employer], a HIPAA-compliant telemedicine application, in concert with a tele-presenter in the room. A face to face patient evaluation with one of my colleagues will occur once the patient is placed in an emergency department room.      DISCLAIMER: This note was prepared with [employer] voice recognition transcription software. Garbled syntax, mangled pronouns, and other bizarre constructions may be attributed to that software system.

## 2024-11-27 NOTE — PHARMACY MED REC
"Admission Medication History     The home medication history was taken by Delroy Ashley.    You may go to "Admission" then "Reconcile Home Medications" tabs to review and/or act upon these items.     The home medication list has been updated by the Pharmacy department.   Please read ALL comments highlighted in yellow.   Please address this information as you see fit.    Feel free to contact us if you have any questions or require assistance.      Medications listed below were obtained from: Patient/family and Analytic software- [employer]  Current Facility-Administered Medications on File Prior to Encounter   Medication Dose Route Frequency Provider Last Rate Last Admin    lactated ringers infusion   Intravenous Continuous David Millan MD 75 mL/hr at 03/21/23 1252 New Bag at 03/21/23 1252     Current Outpatient Medications on File Prior to Encounter   Medication Sig Dispense Refill    busPIRone (BUSPAR) 15 MG tablet Take 1 tablet (15 mg total) by mouth 3 (three) times daily. 90 tablet 5    colchicine (COLCRYS) 0.6 mg tablet Take 1 tablet (0.6 mg total) by mouth 2 (two) times daily. 180 tablet 0    hyoscyamine (LEVBID) 0.375 mg Tb12 Take 0.375 mg by mouth 2 (two) times daily.      LORazepam (ATIVAN) 0.5 MG tablet Take 1 tablet (0.5 mg total) by mouth every 6 (six) hours as needed for Anxiety. 90 tablet 5    metoprolol succinate (TOPROL-XL) 50 MG 24 hr tablet Take 1 tablet (50 mg total) by mouth once daily. 90 tablet 1    oxyCODONE-acetaminophen (PERCOCET) 7.5-325 mg per tablet Take 1 tablet by mouth every 8 (eight) hours as needed for Pain. 15 tablet 0    pantoprazole (PROTONIX) 40 MG tablet Take 40 mg by mouth 2 (two) times daily.      predniSONE (DELTASONE) 10 MG tablet Take 3 tablets (30 mg total) by mouth once daily for 5 days, THEN 2 tablets (20 mg total) once daily for 5 days, THEN 1 tablet (10 mg total) once daily for 5 days, THEN 0.5 tablets (5 mg total) once daily for 5 days. 33 tablet 0    promethazine " (PHENERGAN) 25 MG tablet Take 25 mg by mouth 4 (four) times daily as needed for Nausea.      rivaroxaban (XARELTO) 20 mg Tab Take 1 tablet (20 mg total) by mouth daily with dinner or evening meal. 90 tablet 0    sertraline (ZOLOFT) 100 MG tablet Take 1 tablet (100 mg total) by mouth once daily. (Patient taking differently: Take 100 mg by mouth once daily. Takes with 50 mg) 90 tablet 1    sertraline (ZOLOFT) 50 MG tablet Take 1 tablet (50 mg total) by mouth once daily. (Patient taking differently: Take 50 mg by mouth once daily. Takes with 100 mg) 90 tablet 1    simvastatin (ZOCOR) 20 MG tablet Take 1 tablet (20 mg total) by mouth every evening. 90 tablet 1    zolpidem (AMBIEN) 10 mg Tab Take 1 tablet (10 mg total) by mouth nightly as needed (insomnia). 30 tablet 2    ciprofloxacin HCl (CIPRO) 500 MG tablet Take 1 tablet (500 mg total) by mouth every 12 (twelve) hours. (Patient not taking: Reported on 11/26/2024) 4 tablet 0    ergocalciferol (VITAMIN D2) 50,000 unit Cap Take 1 capsule (50,000 Units total) by mouth every 7 days. (Patient not taking: Reported on 11/8/2024) 12 capsule 1    metroNIDAZOLE (FLAGYL) 500 MG tablet Take 1 tablet (500 mg total) by mouth every 8 (eight) hours. (Patient not taking: Reported on 11/26/2024) 6 tablet 0           Delroy Ashley  EXT [id number]                  .

## 2024-11-27 NOTE — ED PROVIDER NOTES
Encounter Date: 11/27/2024       History     Chief Complaint   Patient presents with    Chest Pain     Chest tightness and shortness of breath x 3 days     HPI    Jacoby Jean-Baptiste is a 49 y.o. male with a past medical history of a DVT currently on Xarelto, ulcerative colitis, type 2 diabetes, hypertension, and hyperlipidemia that presents emergency department for evaluation of chest tightness and shortness of breath that has been ongoing for approximately 3 days.  Patient was in his usual state of health until 3 days ago when he started having worsening chest tightness.  Not having any change in leg swelling.  Does get more out of breath when exerting himself.  He does not take any inhalers at baseline.  Chest tightness is in the middle of his chest.  No numbness, weakness, fevers, cough, or runny nose.    Review of patient's allergies indicates:  No Known Allergies  Past Medical History:   Diagnosis Date    C. difficile colitis     Cavitary pneumonia 11/2023    pos aspergillus serology    Diabetes mellitus 01/2022    Hyperlipidemia 2015    Hypertension 2015    Insomnia 2015    Ulcerative colitis      Past Surgical History:   Procedure Laterality Date    BRONCHOSCOPY WITH FLUOROSCOPY Left 11/20/2023    Procedure: BRONCHOSCOPY, WITH FLUOROSCOPY;  Surgeon: Lisa Villarreal MD;  Location: [facility];  Service: Pulmonary;  Laterality: Left;    BRONCHOSCOPY WITH FLUOROSCOPY N/A 11/30/2023    Procedure: BRONCHOSCOPY, WITH FLUOROSCOPY;  Surgeon: Lisa Villarreal MD;  Location: [facility];  Service: Pulmonary;  Laterality: N/A;    CARDIAC ELECTROPHYSIOLOGY MAPPING AND ABLATION  2017    SVT    CHOLECYSTECTOMY  2011    COLONOSCOPY N/A 5/3/2022    Procedure: COLONOSCOPY;  Surgeon: Shan Jean III, MD;  Location: [facility] ENDO;  Service: Endoscopy;  Laterality: N/A;    COLONOSCOPY N/A 3/16/2024    Procedure: COLONOSCOPY;  Surgeon: ALEKSANDER Ramos MD;  Location: [facility];  Service: Endoscopy;  Laterality: N/A;     COLONOSCOPY WITH FECAL MICROBIOTA TRANSFER N/A 3/21/2023    Procedure: COLONOSCOPY, WITH FECAL MICROBIOTA TRANSFER;  Surgeon: David Millan MD;  Location: [facility];  Service: Endoscopy;  Laterality: N/A;    ESOPHAGOGASTRODUODENOSCOPY N/A 4/24/2023    Procedure: EGD (ESOPHAGOGASTRODUODENOSCOPY);  Surgeon: Shan Jean III, MD;  Location: [facility];  Service: Endoscopy;  Laterality: N/A;    ESOPHAGOGASTRODUODENOSCOPY N/A 6/5/2024    Procedure: EGD (ESOPHAGOGASTRODUODENOSCOPY);  Surgeon: Odalis Mccabe MD;  Location: [facility];  Service: Endoscopy;  Laterality: N/A;    FLEXIBLE SIGMOIDOSCOPY N/A 6/5/2024    Procedure: SIGMOIDOSCOPY, FLEXIBLE;  Surgeon: Odalis Mccabe MD;  Location: [facility];  Service: Endoscopy;  Laterality: N/A;    FLEXIBLE SIGMOIDOSCOPY N/A 7/16/2024    Procedure: SIGMOIDOSCOPY, FLEXIBLE;  Surgeon: Shan Jean III, MD;  Location: [facility];  Service: Endoscopy;  Laterality: N/A;    FLEXIBLE SIGMOIDOSCOPY N/A 8/13/2024    Procedure: SIGMOIDOSCOPY, FLEXIBLE;  Surgeon: Shan Jean III, MD;  Location: [facility];  Service: Endoscopy;  Laterality: N/A;    GANGLION CYST EXCISION Left 1992    UMBILICAL HERNIA REPAIR  2011    with mesh     Family History   Problem Relation Name Age of Onset    Asthma Mother       Social History     Tobacco Use    Smoking status: Former     Average packs/day: 1 pack/day for 30.0 years (30.0 ttl pk-yrs)     Types: Cigarettes     Start date: 1993    Smokeless tobacco: Never    Tobacco comments:     Pt states he has stopped smoking with Chantix three weeks ago. 12/1/23   Substance Use Topics    Alcohol use: Yes     Comment: ocass    Drug use: Not Currently     Review of Systems   Constitutional:  Negative for fever.   HENT:  Negative for sore throat.    Respiratory:  Positive for shortness of breath. Negative for cough.    Cardiovascular:  Positive for chest pain. Negative for leg swelling.   Gastrointestinal:  Negative for abdominal pain,  diarrhea, nausea and vomiting.   Genitourinary:  Negative for dysuria, frequency, hematuria and urgency.   Musculoskeletal:  Negative for back pain.   Skin:  Negative for rash.   Neurological:  Negative for weakness.   Hematological:  Does not bruise/bleed easily.       Physical Exam     Initial Vitals [11/27/24 1138]   BP Pulse Resp Temp SpO2   (!) 155/65 78 18 98.6 °F (37 °C) 100 %      MAP       --         Physical Exam    Nursing note and vitals reviewed.  Constitutional: He appears well-developed and well-nourished.   HENT:   Head: Normocephalic and atraumatic.   Eyes: EOM are normal. Pupils are equal, round, and reactive to light.   Neck:   Normal range of motion.  Cardiovascular:  Normal rate, regular rhythm and normal heart sounds.           Pulmonary/Chest: Breath sounds normal. No respiratory distress. He has no wheezes. He has no rhonchi. He has no rales.   Abdominal: Abdomen is soft. He exhibits no distension. There is no abdominal tenderness. There is no rebound.   Musculoskeletal:         General: Normal range of motion.      Cervical back: Normal range of motion.     Neurological: He is alert and oriented to person, place, and time. He has normal strength. No cranial nerve deficit or sensory deficit. GCS score is 15. GCS eye subscore is 4. GCS verbal subscore is 5. GCS motor subscore is 6.   Skin: Capillary refill takes less than 2 seconds.   Psychiatric: He has a normal mood and affect.         ED Course   Procedures  Labs Reviewed   MAGNESIUM - Abnormal       Result Value    Magnesium 1.4 (*)    CBC W/ AUTO DIFFERENTIAL - Abnormal    WBC 10.84      RBC 3.78 (*)     Hemoglobin 10.6 (*)     Hematocrit 34.8 (*)     MCV 92      MCH 28.0      MCHC 30.5 (*)     RDW 17.3 (*)     Platelets 239      MPV 10.7      Immature Granulocytes 0.8 (*)     Gran # (ANC) 9.9 (*)     Immature Grans (Abs) 0.09 (*)     Lymph # 0.6 (*)     Mono # 0.2 (*)     Eos # 0.0      Baso # 0.01      nRBC 0      Gran % 91.4 (*)      Lymph % 5.5 (*)     Mono % 2.0 (*)     Eosinophil % 0.2      Basophil % 0.1      Differential Method Automated     COMPREHENSIVE METABOLIC PANEL - Abnormal    Sodium 136      Potassium 4.4      Chloride 101      CO2 28      Glucose 164 (*)     BUN 15      Creatinine 1.1      Calcium 8.7      Total Protein 6.6      Albumin 3.8      Total Bilirubin 0.4      Alkaline Phosphatase 47 (*)     AST 20      ALT 17      eGFR >60.0      Anion Gap 7 (*)    TROPONIN I HIGH SENSITIVITY    Troponin I High Sensitivity 4.0     TROPONIN I HIGH SENSITIVITY    Troponin I High Sensitivity 4.2     B-TYPE NATRIURETIC PEPTIDE    BNP 95     SARS-COV-2 RNA AMPLIFICATION, QUAL    SARS-CoV-2 RNA, Amplification, Qual Negative     INFLUENZA A AND B ANTIGEN    Influenza A, Molecular Negative      Influenza B, Molecular Negative      Flu A & B Source Nasal swab      Narrative:     Specimen Source->Nasopharyngeal Swab          Imaging Results              CTA Chest Non-Coronary (PE Studies) (Final result)  Result time 11/27/24 13:56:54      Final result by Adrian Lycnh MD (11/27/24 13:56:54)                   Impression:      1. No evidence of pulmonary embolism to the segmental arterial level. If there is continued clinical concern, consider further evaluation with lower extremity doppler.    2. Scattered coronary artery calcifications.      Electronically signed by: Adrian Lynch  Date:    11/27/2024  Time:    13:56               Narrative:      CMS MANDATED QUALITY DATA - CT RADIATION - 436    All CT scans at this facility utilize dose modulation, iterative reconstruction, and/or weight based dosing when appropriate to reduce radiation dose to as low as reasonably achievable.    CLINICAL HISTORY:  ([id number])48 y/o  (1975) M    Pulmonary embolism (PE) suspected, high prob;    TECHNIQUE:  (A#[id number], exam time 11/27/2024 13:51)    CTA CHEST NON CORONARY (PE STUDIES) NCP318    Axial CT examination of the chest with attention to  the pulmonary arteries was performed using contiguous axial images from the diaphragms to the lung apices following the intravenous administration of non-ionic contrast material. Images were reviewed using lung, mediastinal, and bone windows. The study was performed with thin sections with subsequent 3-D reformats/MIP/reconstructions in multiple planes.    COMPARISON:  None available.    FINDINGS:  CTA PE evaluation: This is a moderate quality study for the evaluation of pulmonary embolism secondary to contrast bolus timing. The pulmonary arteries are normal in appearance without pulmonary emboli noted up to the segmental level, noting the limitations of CT technique for identifying small or isolated subsegmental emboli.    CT Chest:    Visualized neck: normal    Lungs: There is dependent atelectasis in the bilateral lower lobes.  The lungs are otherwise clear.    Airway: The trachea and central bronchial tree appear normal.    Pleura: There is no pleural effusion. There is no pneumothorax.    Cardiovascular: The heart is normal in size. There are no findings of right heart failure. The pulmonary artery is normal in size. There is no pericardial effusion. The thoracic aorta and great vessels are normal in course and caliber.  There is scattered coronary artery calcifications predominantly in the proximal-mid LAD distribution.    Mediastinum: There is no supraclavicular  axillary  mediastinal  or hilar lymphadenopathy.    Soft tissues: The peripheral soft tissues appear normal.    Musculoskeletal: There is moderate to severe disc height loss with small adjacent endplate osteophytes.    Esophagus: The esophagus appears grossly normal.    Upper Abdomen: No acute abnormality in the abdomen.  The gallbladder is not seen, surgically absent.                                       X-Ray Chest PA And Lateral (Final result)  Result time 11/27/24 12:29:46      Final result by Froylan Cardozo MD (11/27/24 12:29:46)                    Impression:      No acute cardiopulmonary abnormality.      Electronically signed by: Froylan Cardozo  Date:    11/27/2024  Time:    12:29               Narrative:    EXAMINATION:  XR CHEST PA AND LATERAL    CLINICAL HISTORY:  Chest Pain;    FINDINGS:  PA and lateral chest compared with 11/01/2024 shows normal cardiomediastinal silhouette.    Lungs are clear. Pulmonary vasculature is normal. Degenerative changes affect the thoracic spine.                                       Medications   albuterol-ipratropium 2.5 mg-0.5 mg/3 mL nebulizer solution 3 mL (3 mLs Nebulization Given 11/27/24 1320)   magnesium sulfate 2g in water 50mL IVPB (premix) (0 g Intravenous Stopped 11/27/24 1602)   iohexoL (OMNIPAQUE 350) injection 100 mL (100 mLs Intravenous Given 11/27/24 1350)   acetaminophen tablet 1,000 mg (1,000 mg Oral Given 11/27/24 1444)     Medical Decision Making  Jacoby Jean-Baptiste is a 49 y.o. male with a past medical history of a DVT currently on Xarelto, ulcerative colitis, type 2 diabetes, hypertension, and hyperlipidemia that presents emergency department for evaluation of chest tightness and shortness of breath that has been ongoing for approximately 3 days.  Vitals were stable.  Nontoxic male.  Lungs are clear.  Heart sounds within normal limits.  Abdominal exam benign.  No lower extremity edema.  Differential includes but isn't limited to ACS, PE, pneumonia, pneumothorax, symptomatic pleural effusion, symptomatic anemia, electrolyte abnormality, and arrhythmia.  EKG showed normal sinus rhythm without acute ST segment or T-wave changes.  Troponin x2 was negative.  Magnesium was low, so this has repleted.  No other significant electrolyte abnormalities.  Hemoglobin is stable.  No leukocytosis.  Chest x-ray is clear.  CT PE does not show VTE.  At this time, stable for discharge.  Return precautions given.  Instructed to follow up with Cardiology.    Amount and/or Complexity of Data Reviewed  Radiology:  ordered.    Risk  OTC drugs.  Prescription drug management.                                      Clinical Impression:  Final diagnoses:  [R07.9] Chest pain          ED Disposition Condition    Discharge Stable          ED Prescriptions    None       Follow-up Information       Follow up With Specialties Details Why Contact Info Additional Information    [facility] - Emergency Dept Emergency Medicine  As needed, If symptoms worsen [address] [id number] [phone] 1st floor    Hunter Aguilar III, MD Family Medicine In 3 days Follow-up on ED visit [address]  [address]  [place] [address]  [phone]                Huey Alba MD  11/27/24 3798

## 2024-11-30 NOTE — ASSESSMENT & PLAN NOTE
Currently on Omvoh for treatment of UC, started in 4/2024, but patient reports progressively worsening symptoms despite compliance with med. He was on Remicade previously, but had to be stopped due to infectious complications.     His GI doctor is already in the works to change Omvoh to Tremfya .    C diff negative.  Discontinued p.o. vancomycin.   Oral Abx on discharge with GI followup

## 2024-11-30 NOTE — DISCHARGE SUMMARY
[facility] Medicine  Discharge Summary      Patient Name: Jacoby Jean-Baptiste  MRN: [id number]  DAYSI: [id number]  Patient Class: IP- Inpatient  Admission Date: 11/17/2024  Hospital Length of Stay: 4 days  Discharge Date and Time: 11/21/2024  2:10 PM  Attending Physician: No att. providers found   Discharging Provider: Sofie Prasad MD  Primary Care Provider: Hunter Aguilar III, MD    Primary Care Team: Networked reference to record PCT     HPI:   49-year-old male with a past medical history of hypertension, non-insulin-dependent diabetes, obesity, hyperlipidemia, ulcerative colitis currently on Omvah , previous DVT on Xarelto who presents to the emergency room with reports of abdominal pain, rectal bleeding, bloody diarrhea for the last 2-3 days.  Patient sees Dr. Dr. Fraga [facility]astroenterology.  Denies chest pain, shortness breath, fever, generalized weakness.  Of note,He has history of recurrent C. Diff requiring fecal transplant in 2023, but C. Diff stool PCR has been repeatedly negative this year.   The ER, leukocytosis, WBCs on 11/15 was 13.7 now today 17.69, mild anemia with H&H 10.7, 34.3.  UA negative for infection, CT abdomen with evidence of active colitis.    Admit to hospital medicine for ulcerative colitis    * No surgery found *      Hospital Course:   Patient was admitted with flare of ulcerative colitis.  He was put on IV antibiotics and IV methylprednisolone.  We consulted with gastroenterology.  We also provided supportive care with antiemetics and crystalloids.  Patient continued on IV therapies for a couple of days and ultimately was able to be transferred to oral antibiotics on discharge per Briana's recommendation.  They had him in the works for switching his ulcerative colitis medication and will be followed up in the outpatient setting.  Patient was agreeable and thinks this plan is reasonable.     Goals of Care Treatment Preferences:  Code Status: Full  Code      SDOH Screening:  The patient was screened for utility difficulties, food insecurity, transport difficulties, housing insecurity, and interpersonal safety and there were no concerns identified this admission.     Consults:   Consults (From admission, onward)          Status Ordering Provider     Inpatient consult to PICC Line Nurse  Once        Provider:  (Not yet assigned)    Completed LONI HERNANDEZ     Inpatient consult to Gastroenterology  Once        Provider:  Juan Carlos Chanel MD    Completed KIKA MAHAN            GI  * Ulcerative colitis flare  Currently on Omvoh for treatment of UC, started in 4/2024, but patient reports progressively worsening symptoms despite compliance with med. He was on Remicade previously, but had to be stopped due to infectious complications.     His GI doctor is already in the works to change Omvoh to Tremfya .    C diff negative.  Discontinued p.o. vancomycin.   Oral Abx on discharge with GI followup        Final Active Diagnoses:    Diagnosis Date Noted POA    PRINCIPAL PROBLEM:  Ulcerative colitis flare [K51.90] 01/11/2023 Yes    Chronic disease anemia [D63.8] 01/11/2023 Yes    Type 2 diabetes mellitus without complication, without long-term current use of insulin [E11.9] 01/29/2022 Yes    Morbid obesity [E66.01] 02/03/2021 Yes      Problems Resolved During this Admission:       Discharged Condition: good    Disposition: Home or Self Care    Follow Up:   Follow-up Information       Shan Jean III, MD Follow up on 12/2/2024.    Specialty: Gastroenterology  Why: as previously scheduled.  Contact information:  [address] [address] [phone]               Hunter Aguilar III, MD. Schedule an appointment as soon as possible for a visit in 1 week(s).    Specialty: Family Medicine  Why: Should obtain CBC and BMP  Contact information:  [address]  [address]  [place] [address] [phone]               [facility]  Care Follow up.    Specialties: Physical Therapy, Home Health Services  Why: office will contact patient to schedule.  Contact information:  [address]  [address]  [place] [address]  [phone]                           Patient Instructions:      Ambulatory referral/consult to Outpatient Case Management   Referral Priority: Routine Referral Type: Consultation   Referral Reason: Specialty Services Required   Number of Visits Requested: 1     SUBSEQUENT HOME HEALTH ORDERS   Order Comments: Please resume home health     Order Specific Question Answer Comments   What Home Health Agency is the patient currently using? Other/External      Activity as tolerated       Significant Diagnostic Studies:     Pending Diagnostic Studies:       None           Medications:  Reconciled Home Medications:      Medication List        START taking these medications      ciprofloxacin HCl 500 MG tablet  Commonly known as: CIPRO  Take 1 tablet (500 mg total) by mouth every 12 (twelve) hours.     metroNIDAZOLE 500 MG tablet  Commonly known as: FLAGYL  Take 1 tablet (500 mg total) by mouth every 8 (eight) hours.     oxyCODONE-acetaminophen 7.5-325 mg per tablet  Commonly known as: PERCOCET  Take 1 tablet by mouth every 8 (eight) hours as needed for Pain.     rivaroxaban 20 mg Tab  Commonly known as: XARELTO  Take 1 tablet (20 mg total) by mouth daily with dinner or evening meal.            CHANGE how you take these medications      predniSONE 10 MG tablet  Commonly known as: DELTASONE  Take 3 tablets (30 mg total) by mouth once daily for 5 days, THEN 2 tablets (20 mg total) once daily for 5 days, THEN 1 tablet (10 mg total) once daily for 5 days, THEN 0.5 tablets (5 mg total) once daily for 5 days.  Start taking on: November 21, 2024  What changed: See the new instructions.            CONTINUE taking these medications      busPIRone 15 MG tablet  Commonly known as: BUSPAR  Take 1 tablet (15 mg total) by mouth 3 (three) times daily.      colchicine 0.6 mg tablet  Commonly known as: COLCRYS  Take 1 tablet (0.6 mg total) by mouth 2 (two) times daily.     hyoscyamine 0.375 mg Tb12  Commonly known as: Levbid  Take 0.375 mg by mouth 2 (two) times daily.     LORazepam 0.5 MG tablet  Commonly known as: ATIVAN  Take 1 tablet (0.5 mg total) by mouth every 6 (six) hours as needed for Anxiety.     metoprolol succinate 50 MG 24 hr tablet  Commonly known as: TOPROL-XL  Take 1 tablet (50 mg total) by mouth once daily.     pantoprazole 40 MG tablet  Commonly known as: PROTONIX  Take 40 mg by mouth 2 (two) times daily.     promethazine 25 MG tablet  Commonly known as: PHENERGAN  Take 25 mg by mouth 4 (four) times daily as needed for Nausea.     * sertraline 100 MG tablet  Commonly known as: ZOLOFT  Take 1 tablet (100 mg total) by mouth once daily.     * sertraline 50 MG tablet  Commonly known as: ZOLOFT  Take 1 tablet (50 mg total) by mouth once daily.     simvastatin 20 MG tablet  Commonly known as: ZOCOR  Take 1 tablet (20 mg total) by mouth every evening.     zolpidem 10 mg Tab  Commonly known as: AMBIEN  Take 1 tablet (10 mg total) by mouth nightly as needed (insomnia).           * This list has 2 medication(s) that are the same as other medications prescribed for you. Read the directions carefully, and ask your doctor or other care provider to review them with you.                STOP taking these medications      HYDROcodone-acetaminophen 5-325 mg per tablet  Commonly known as: NORCO            ASK your doctor about these medications      ergocalciferol 50,000 unit Cap  Commonly known as: VITAMIN D2  Take 1 capsule (50,000 Units total) by mouth every 7 days.              Indwelling Lines/Drains at time of discharge:   Lines/Drains/Airways       None                   Time spent on the discharge of patient: 35 minutes         Sofie Prasad MD  Department of Hospital Medicine  [facility]

## 2024-12-02 ENCOUNTER — PATIENT MESSAGE (OUTPATIENT)
Dept: FAMILY MEDICINE | Facility: CLINIC | Age: 49
End: 2024-12-02

## 2024-12-02 ENCOUNTER — OFFICE VISIT (OUTPATIENT)
Dept: FAMILY MEDICINE | Facility: CLINIC | Age: 49
End: 2024-12-02
Payer: COMMERCIAL

## 2024-12-02 VITALS
WEIGHT: 304.69 LBS | BODY MASS INDEX: 50.76 KG/M2 | HEART RATE: 84 BPM | OXYGEN SATURATION: 98 % | HEIGHT: 65 IN | TEMPERATURE: 98 F | DIASTOLIC BLOOD PRESSURE: 62 MMHG | SYSTOLIC BLOOD PRESSURE: 130 MMHG

## 2024-12-02 DIAGNOSIS — F41.9 ANXIETY: ICD-10-CM

## 2024-12-02 DIAGNOSIS — E83.42 HYPOMAGNESEMIA: ICD-10-CM

## 2024-12-02 DIAGNOSIS — Z79.52 LONG TERM SYSTEMIC STEROID USER: ICD-10-CM

## 2024-12-02 DIAGNOSIS — Z79.60 LONG-TERM USE OF IMMUNOSUPPRESSANT MEDICATION: ICD-10-CM

## 2024-12-02 DIAGNOSIS — Z79.01 ANTICOAGULATED: ICD-10-CM

## 2024-12-02 DIAGNOSIS — E66.01 MORBID OBESITY: ICD-10-CM

## 2024-12-02 DIAGNOSIS — E11.9 TYPE 2 DIABETES MELLITUS WITHOUT COMPLICATION, WITHOUT LONG-TERM CURRENT USE OF INSULIN: Primary | ICD-10-CM

## 2024-12-02 DIAGNOSIS — F41.0 PANIC ATTACKS: ICD-10-CM

## 2024-12-02 DIAGNOSIS — Z98.890 S/P RADIOFREQUENCY ABLATION OPERATION FOR ARRHYTHMIA: ICD-10-CM

## 2024-12-02 DIAGNOSIS — K51.919 ULCERATIVE COLITIS WITH COMPLICATION, UNSPECIFIED LOCATION: ICD-10-CM

## 2024-12-02 DIAGNOSIS — F32.A DEPRESSION, UNSPECIFIED DEPRESSION TYPE: ICD-10-CM

## 2024-12-02 DIAGNOSIS — D63.8 CHRONIC DISEASE ANEMIA: ICD-10-CM

## 2024-12-02 DIAGNOSIS — Z86.79 S/P RADIOFREQUENCY ABLATION OPERATION FOR ARRHYTHMIA: ICD-10-CM

## 2024-12-02 DIAGNOSIS — Z86.718 HISTORY OF DVT (DEEP VEIN THROMBOSIS): ICD-10-CM

## 2024-12-02 PROCEDURE — 99214 OFFICE O/P EST MOD 30 MIN: CPT | Mod: S$GLB,,, | Performed by: FAMILY MEDICINE

## 2024-12-02 PROCEDURE — 3066F NEPHROPATHY DOC TX: CPT | Mod: CPTII,S$GLB,, | Performed by: FAMILY MEDICINE

## 2024-12-02 PROCEDURE — 3075F SYST BP GE 130 - 139MM HG: CPT | Mod: CPTII,S$GLB,, | Performed by: FAMILY MEDICINE

## 2024-12-02 PROCEDURE — 3044F HG A1C LEVEL LT 7.0%: CPT | Mod: CPTII,S$GLB,, | Performed by: FAMILY MEDICINE

## 2024-12-02 PROCEDURE — 3008F BODY MASS INDEX DOCD: CPT | Mod: CPTII,S$GLB,, | Performed by: FAMILY MEDICINE

## 2024-12-02 PROCEDURE — 3061F NEG MICROALBUMINURIA REV: CPT | Mod: CPTII,S$GLB,, | Performed by: FAMILY MEDICINE

## 2024-12-02 PROCEDURE — 1160F RVW MEDS BY RX/DR IN RCRD: CPT | Mod: CPTII,S$GLB,, | Performed by: FAMILY MEDICINE

## 2024-12-02 PROCEDURE — 3078F DIAST BP <80 MM HG: CPT | Mod: CPTII,S$GLB,, | Performed by: FAMILY MEDICINE

## 2024-12-02 PROCEDURE — 1111F DSCHRG MED/CURRENT MED MERGE: CPT | Mod: CPTII,S$GLB,, | Performed by: FAMILY MEDICINE

## 2024-12-02 PROCEDURE — 1159F MED LIST DOCD IN RCRD: CPT | Mod: CPTII,S$GLB,, | Performed by: FAMILY MEDICINE

## 2024-12-02 PROCEDURE — 99999 PR PBB SHADOW E&M-EST. PATIENT-LVL V: CPT | Mod: PBBFAC,,, | Performed by: FAMILY MEDICINE

## 2024-12-02 RX ORDER — ESCITALOPRAM OXALATE 10 MG/1
10 TABLET ORAL DAILY
COMMUNITY
End: 2024-12-02 | Stop reason: SDUPTHER

## 2024-12-02 RX ORDER — ESCITALOPRAM OXALATE 10 MG/1
10 TABLET ORAL DAILY
Qty: 30 TABLET | Refills: 2 | Status: ON HOLD | OUTPATIENT
Start: 2024-12-02

## 2024-12-02 NOTE — PATIENT INSTRUCTIONS
Slo Mg 64 mg daily    Decrease Zoloft to 100 mg x 5 days. Then 50 mg x 5 days and stop    Start Lexapro 10 mg daily

## 2024-12-03 RX ORDER — SEMAGLUTIDE 0.68 MG/ML
0.25 INJECTION, SOLUTION SUBCUTANEOUS
Qty: 3 ML | Refills: 0 | Status: ON HOLD | OUTPATIENT
Start: 2024-12-03

## 2024-12-03 NOTE — TELEPHONE ENCOUNTER
No care due was identified.  [facility] Embedded Care Due Messages. Reference number: [id number].   12/03/2024 9:37:35 AM CST

## 2024-12-03 NOTE — PROGRESS NOTES
Subjective:       Patient ID: Jacoby Jean-Baptiste is a 49 y.o. male.    Chief Complaint: Follow-up    Hospitalized again for ulcerative colitis from November 17th to the 21st.  Unfortunately he was in the hospital for his appointment with GI surgeon.  In had to be rescheduled this will not be until some time in January now.  To start a new medication from Gastroenterology in 2 days new infusion.  Went in with bleeding and diarrhea C diff was negative.  The diarrhea is little better but the bleeding has stopped.  He is still anticoagulated with his Xarelto.  Anxiety went back to the emergency room on November 27th for this.  Panic attacks.  Status post radiofrequency ablation for his heart.  History of DVT.  Type 2 diabetes.  Morbid obesity BMI of 50.  Long-term steroid use on 20 mg per day now.  He is immunosuppressed.  Has a anemia with a hemoglobin of 10.6.  Also hypomagnesemia.  Panic attacks.  Crowds bother him especially.    Physical examination.  Vital signs noted.  Overweight male no acute distress.  Neck without bruit no adenopathy.  Chest clear.  Heart regular rate rhythm.  Abdomen bowel sounds are positive soft but diffusely tender.  Extremities without edema positive pulses.        Objective:        Assessment:       1. Type 2 diabetes mellitus without complication, without long-term current use of insulin    2. Depression, unspecified depression type    3. Anxiety    4. S/P radiofrequency ablation operation for arrhythmia    5. Long term systemic steroid user    6. Morbid obesity    7. Long-term use of immunosuppressant medication    8. Panic attacks    9. Hypomagnesemia    10. Chronic disease anemia    11. History of DVT (deep vein thrombosis)    12. Anticoagulated    13. Ulcerative colitis with complication, unspecified location        Plan:       Type 2 diabetes mellitus without complication, without long-term current use of insulin    Depression, unspecified depression type    Anxiety    S/P  radiofrequency ablation operation for arrhythmia    Long term systemic steroid user    Morbid obesity    Long-term use of immunosuppressant medication    Panic attacks    Hypomagnesemia    Chronic disease anemia    History of DVT (deep vein thrombosis)    Anticoagulated    Ulcerative colitis with complication, unspecified location    Other orders  -     EScitalopram oxalate (LEXAPRO) 10 MG tablet; Take 1 tablet (10 mg total) by mouth once daily.  Dispense: 30 tablet; Refill: 2      Start the new infusion as planned.  Slow-Mag 64 daily to minimize diarrhea.  Decrease the Zoloft to 100 mg daily for 5 days then 50 mg daily for 5 days then discontinue it and start Lexapro 10 mg daily then.  Follow-up in one-month on this.  Continue current diabetes treatment.

## 2024-12-04 ENCOUNTER — OFFICE VISIT (OUTPATIENT)
Dept: WOUND CARE | Facility: HOSPITAL | Age: 49
End: 2024-12-04
Attending: FAMILY MEDICINE
Payer: COMMERCIAL

## 2024-12-04 VITALS
HEART RATE: 85 BPM | SYSTOLIC BLOOD PRESSURE: 167 MMHG | RESPIRATION RATE: 17 BRPM | TEMPERATURE: 99 F | DIASTOLIC BLOOD PRESSURE: 82 MMHG

## 2024-12-04 DIAGNOSIS — S41.102D OPEN WOUND OF LEFT UPPER ARM, SUBSEQUENT ENCOUNTER: Primary | ICD-10-CM

## 2024-12-04 DIAGNOSIS — L02.818 CUTANEOUS ABSCESS OF OTHER SITE: ICD-10-CM

## 2024-12-04 PROCEDURE — 99499 UNLISTED E&M SERVICE: CPT | Mod: ,,, | Performed by: FAMILY MEDICINE

## 2024-12-04 PROCEDURE — 11042 DBRDMT SUBQ TIS 1ST 20SQCM/<: CPT | Mod: PN | Performed by: FAMILY MEDICINE

## 2024-12-04 PROCEDURE — 11042 DBRDMT SUBQ TIS 1ST 20SQCM/<: CPT | Mod: ,,, | Performed by: FAMILY MEDICINE

## 2024-12-04 NOTE — PROGRESS NOTES
Wound Care Progress Note    Subjective:       Patient ID: Jacoby Jean-Baptiste is a 49 y.o. male.    Chief Complaint: No chief complaint on file.    HPI  Pt seen in clinic for a FU visit for a left arm surgical wound. Wound is slowly healing, pt has a history of delayed healing. No other complaints today  Review of Systems   Skin:  Positive for wound.        Open wound left arm   All other systems reviewed and are negative.      Objective:        Physical Exam  Vitals and nursing note reviewed.   Constitutional:       Appearance: Normal appearance.   Skin:     General: Skin is warm and dry.      Findings: Lesion present.      Comments: Left arm surgical wound post I and D, see wound care assessment documentation in chart review scanned under the media tab   Psychiatric:         Judgment: Judgment normal.       There were no vitals filed for this visit.    Assessment:           ICD-10-CM ICD-9-CM   1. Open wound of left upper arm, subsequent encounter  S41.102D V58.89     880.03   2. Cutaneous abscess of other site  L02.818 682.8                Plan:                  Diagnoses and all orders for this visit:    Open wound of left upper arm, subsequent encounter    Cutaneous abscess of other site          Much of the documentation for this visit was completed in the [facility] system.  Please see the attached documentation for further details about the patient's care. Scanned under the Media tab.

## 2024-12-06 ENCOUNTER — HOSPITAL ENCOUNTER (INPATIENT)
Facility: HOSPITAL | Age: 49
LOS: 1 days | Discharge: HOME | DRG: 386 | End: 2024-12-09
Attending: EMERGENCY MEDICINE
Payer: COMMERCIAL

## 2024-12-06 ENCOUNTER — DOCUMENT SCAN (OUTPATIENT)
Dept: HOME HEALTH SERVICES | Facility: HOSPITAL | Age: 49
End: 2024-12-06
Payer: COMMERCIAL

## 2024-12-06 DIAGNOSIS — K51.919 ULCERATIVE COLITIS WITH COMPLICATION, UNSPECIFIED LOCATION: Primary | ICD-10-CM

## 2024-12-06 DIAGNOSIS — R07.9 CHEST PAIN: ICD-10-CM

## 2024-12-06 DIAGNOSIS — K51.90 ULCERATIVE COLITIS: ICD-10-CM

## 2024-12-06 PROBLEM — I82.409 DVT (DEEP VENOUS THROMBOSIS): Status: ACTIVE | Noted: 2024-12-06

## 2024-12-06 LAB
ALBUMIN SERPL BCP-MCNC: 4 G/DL (ref 3.5–5.2)
ALP SERPL-CCNC: 62 U/L (ref 55–135)
ALT SERPL W/O P-5'-P-CCNC: 15 U/L (ref 10–44)
ANION GAP SERPL CALC-SCNC: 5 MMOL/L (ref 8–16)
AST SERPL-CCNC: 20 U/L (ref 10–40)
BASOPHILS # BLD AUTO: 0.05 K/UL (ref 0–0.2)
BASOPHILS NFR BLD: 0.4 % (ref 0–1.9)
BILIRUB SERPL-MCNC: 0.3 MG/DL (ref 0.1–1)
BILIRUB UR QL STRIP: NEGATIVE
BUN SERPL-MCNC: 19 MG/DL (ref 6–20)
CALCIUM SERPL-MCNC: 9.2 MG/DL (ref 8.7–10.5)
CHLORIDE SERPL-SCNC: 104 MMOL/L (ref 95–110)
CLARITY UR: CLEAR
CO2 SERPL-SCNC: 29 MMOL/L (ref 23–29)
COLOR UR: YELLOW
CREAT SERPL-MCNC: 1.2 MG/DL (ref 0.5–1.4)
CRP SERPL-MCNC: 1.9 MG/DL
DIFFERENTIAL METHOD BLD: ABNORMAL
EOSINOPHIL # BLD AUTO: 0.3 K/UL (ref 0–0.5)
EOSINOPHIL NFR BLD: 2.1 % (ref 0–8)
ERYTHROCYTE [DISTWIDTH] IN BLOOD BY AUTOMATED COUNT: 17 % (ref 11.5–14.5)
ERYTHROCYTE [SEDIMENTATION RATE] IN BLOOD BY WESTERGREN METHOD: 50 MM/HR (ref 0–10)
EST. GFR  (NO RACE VARIABLE): >60 ML/MIN/1.73 M^2
GLUCOSE SERPL-MCNC: 116 MG/DL (ref 70–110)
GLUCOSE UR QL STRIP: NEGATIVE
HCT VFR BLD AUTO: 33.1 % (ref 40–54)
HGB BLD-MCNC: 10 G/DL (ref 14–18)
HGB UR QL STRIP: NEGATIVE
IMM GRANULOCYTES # BLD AUTO: 0.08 K/UL (ref 0–0.04)
IMM GRANULOCYTES NFR BLD AUTO: 0.6 % (ref 0–0.5)
KETONES UR QL STRIP: NEGATIVE
LEUKOCYTE ESTERASE UR QL STRIP: NEGATIVE
LIPASE SERPL-CCNC: 12 U/L (ref 4–60)
LYMPHOCYTES # BLD AUTO: 1.7 K/UL (ref 1–4.8)
LYMPHOCYTES NFR BLD: 12.5 % (ref 18–48)
MCH RBC QN AUTO: 27.6 PG (ref 27–31)
MCHC RBC AUTO-ENTMCNC: 30.2 G/DL (ref 32–36)
MCV RBC AUTO: 91 FL (ref 82–98)
MONOCYTES # BLD AUTO: 1 K/UL (ref 0.3–1)
MONOCYTES NFR BLD: 7.9 % (ref 4–15)
NEUTROPHILS # BLD AUTO: 10.1 K/UL (ref 1.8–7.7)
NEUTROPHILS NFR BLD: 76.5 % (ref 38–73)
NITRITE UR QL STRIP: NEGATIVE
NRBC BLD-RTO: 0 /100 WBC
PH UR STRIP: 7 [PH] (ref 5–8)
PLATELET # BLD AUTO: 257 K/UL (ref 150–450)
PMV BLD AUTO: 11.2 FL (ref 9.2–12.9)
POTASSIUM SERPL-SCNC: 5.2 MMOL/L (ref 3.5–5.1)
PROT SERPL-MCNC: 7.2 G/DL (ref 6–8.4)
PROT UR QL STRIP: NEGATIVE
RBC # BLD AUTO: 3.62 M/UL (ref 4.6–6.2)
SODIUM SERPL-SCNC: 138 MMOL/L (ref 136–145)
SP GR UR STRIP: 1.02 (ref 1–1.03)
URN SPEC COLLECT METH UR: NORMAL
UROBILINOGEN UR STRIP-ACNC: NEGATIVE EU/DL
VIT B12 SERPL-MCNC: 335 PG/ML (ref 210–950)
WBC # BLD AUTO: 13.24 K/UL (ref 3.9–12.7)

## 2024-12-06 PROCEDURE — 96375 TX/PRO/DX INJ NEW DRUG ADDON: CPT

## 2024-12-06 PROCEDURE — 80053 COMPREHEN METABOLIC PANEL: CPT | Performed by: PHYSICIAN ASSISTANT

## 2024-12-06 PROCEDURE — 86140 C-REACTIVE PROTEIN: CPT | Performed by: PHYSICIAN ASSISTANT

## 2024-12-06 PROCEDURE — 82607 VITAMIN B-12: CPT | Performed by: PHYSICIAN ASSISTANT

## 2024-12-06 PROCEDURE — 25000003 PHARM REV CODE 250: Performed by: INTERNAL MEDICINE

## 2024-12-06 PROCEDURE — 96365 THER/PROPH/DIAG IV INF INIT: CPT

## 2024-12-06 PROCEDURE — 83690 ASSAY OF LIPASE: CPT | Performed by: PHYSICIAN ASSISTANT

## 2024-12-06 PROCEDURE — G0378 HOSPITAL OBSERVATION PER HR: HCPCS

## 2024-12-06 PROCEDURE — 85025 COMPLETE CBC W/AUTO DIFF WBC: CPT | Performed by: PHYSICIAN ASSISTANT

## 2024-12-06 PROCEDURE — 63600175 PHARM REV CODE 636 W HCPCS: Performed by: EMERGENCY MEDICINE

## 2024-12-06 PROCEDURE — 36415 COLL VENOUS BLD VENIPUNCTURE: CPT | Performed by: PHYSICIAN ASSISTANT

## 2024-12-06 PROCEDURE — 81003 URINALYSIS AUTO W/O SCOPE: CPT | Performed by: PHYSICIAN ASSISTANT

## 2024-12-06 PROCEDURE — 63600175 PHARM REV CODE 636 W HCPCS: Mod: JZ,JG | Performed by: INTERNAL MEDICINE

## 2024-12-06 PROCEDURE — 96374 THER/PROPH/DIAG INJ IV PUSH: CPT

## 2024-12-06 PROCEDURE — 25500020 PHARM REV CODE 255: Performed by: EMERGENCY MEDICINE

## 2024-12-06 PROCEDURE — 99285 EMERGENCY DEPT VISIT HI MDM: CPT | Mod: 25

## 2024-12-06 PROCEDURE — 85651 RBC SED RATE NONAUTOMATED: CPT | Performed by: PHYSICIAN ASSISTANT

## 2024-12-06 PROCEDURE — 96368 THER/DIAG CONCURRENT INF: CPT

## 2024-12-06 RX ORDER — METRONIDAZOLE 500 MG/100ML
500 INJECTION, SOLUTION INTRAVENOUS
Status: DISCONTINUED | OUTPATIENT
Start: 2024-12-06 | End: 2024-12-07

## 2024-12-06 RX ORDER — SODIUM,POTASSIUM PHOSPHATES 280-250MG
2 POWDER IN PACKET (EA) ORAL
Status: DISCONTINUED | OUTPATIENT
Start: 2024-12-06 | End: 2024-12-09 | Stop reason: HOSPADM

## 2024-12-06 RX ORDER — HYDROCODONE BITARTRATE AND ACETAMINOPHEN 5; 325 MG/1; MG/1
1 TABLET ORAL EVERY 6 HOURS PRN
Status: DISCONTINUED | OUTPATIENT
Start: 2024-12-06 | End: 2024-12-09

## 2024-12-06 RX ORDER — ALUMINUM HYDROXIDE, MAGNESIUM HYDROXIDE, AND SIMETHICONE 1200; 120; 1200 MG/30ML; MG/30ML; MG/30ML
30 SUSPENSION ORAL 4 TIMES DAILY PRN
Status: DISCONTINUED | OUTPATIENT
Start: 2024-12-06 | End: 2024-12-09 | Stop reason: HOSPADM

## 2024-12-06 RX ORDER — ONDANSETRON HYDROCHLORIDE 2 MG/ML
4 INJECTION, SOLUTION INTRAVENOUS
Status: COMPLETED | OUTPATIENT
Start: 2024-12-06 | End: 2024-12-06

## 2024-12-06 RX ORDER — HYDROMORPHONE HYDROCHLORIDE 1 MG/ML
1 INJECTION, SOLUTION INTRAMUSCULAR; INTRAVENOUS; SUBCUTANEOUS
Status: COMPLETED | OUTPATIENT
Start: 2024-12-06 | End: 2024-12-06

## 2024-12-06 RX ORDER — BUDESONIDE 3 MG/1
9 CAPSULE, COATED PELLETS ORAL DAILY
Status: ON HOLD | COMMUNITY
End: 2024-12-09

## 2024-12-06 RX ORDER — ESCITALOPRAM OXALATE 10 MG/1
10 TABLET ORAL DAILY
Status: DISCONTINUED | OUTPATIENT
Start: 2024-12-07 | End: 2024-12-09 | Stop reason: HOSPADM

## 2024-12-06 RX ORDER — ACETAMINOPHEN 325 MG/1
650 TABLET ORAL EVERY 8 HOURS PRN
Status: DISCONTINUED | OUTPATIENT
Start: 2024-12-06 | End: 2024-12-09 | Stop reason: HOSPADM

## 2024-12-06 RX ORDER — FAMOTIDINE 20 MG/1
20 TABLET, FILM COATED ORAL 2 TIMES DAILY
Status: DISCONTINUED | OUTPATIENT
Start: 2024-12-06 | End: 2024-12-09 | Stop reason: HOSPADM

## 2024-12-06 RX ORDER — HYDROCODONE BITARTRATE AND ACETAMINOPHEN 10; 325 MG/1; MG/1
1 TABLET ORAL 4 TIMES DAILY
COMMUNITY

## 2024-12-06 RX ORDER — SODIUM CHLORIDE 9 MG/ML
INJECTION, SOLUTION INTRAVENOUS CONTINUOUS
Status: DISCONTINUED | OUTPATIENT
Start: 2024-12-06 | End: 2024-12-08

## 2024-12-06 RX ORDER — METFORMIN HYDROCHLORIDE 500 MG/1
500 TABLET, EXTENDED RELEASE ORAL 2 TIMES DAILY WITH MEALS
Status: ON HOLD | COMMUNITY
End: 2024-12-09 | Stop reason: HOSPADM

## 2024-12-06 RX ORDER — LANOLIN ALCOHOL/MO/W.PET/CERES
800 CREAM (GRAM) TOPICAL
Status: DISCONTINUED | OUTPATIENT
Start: 2024-12-06 | End: 2024-12-09 | Stop reason: HOSPADM

## 2024-12-06 RX ORDER — NALOXONE HCL 0.4 MG/ML
0.02 VIAL (ML) INJECTION
Status: DISCONTINUED | OUTPATIENT
Start: 2024-12-06 | End: 2024-12-09 | Stop reason: HOSPADM

## 2024-12-06 RX ORDER — ACETAMINOPHEN 325 MG/1
650 TABLET ORAL EVERY 4 HOURS PRN
Status: DISCONTINUED | OUTPATIENT
Start: 2024-12-06 | End: 2024-12-09 | Stop reason: HOSPADM

## 2024-12-06 RX ORDER — ONDANSETRON HYDROCHLORIDE 2 MG/ML
4 INJECTION, SOLUTION INTRAVENOUS EVERY 6 HOURS PRN
Status: DISCONTINUED | OUTPATIENT
Start: 2024-12-06 | End: 2024-12-09 | Stop reason: HOSPADM

## 2024-12-06 RX ORDER — TALC
6 POWDER (GRAM) TOPICAL NIGHTLY PRN
Status: DISCONTINUED | OUTPATIENT
Start: 2024-12-06 | End: 2024-12-09 | Stop reason: HOSPADM

## 2024-12-06 RX ORDER — ZOLPIDEM TARTRATE 5 MG/1
10 TABLET ORAL NIGHTLY PRN
Status: DISCONTINUED | OUTPATIENT
Start: 2024-12-06 | End: 2024-12-09 | Stop reason: HOSPADM

## 2024-12-06 RX ORDER — HYDROMORPHONE HYDROCHLORIDE 1 MG/ML
1 INJECTION, SOLUTION INTRAMUSCULAR; INTRAVENOUS; SUBCUTANEOUS EVERY 4 HOURS PRN
Status: DISCONTINUED | OUTPATIENT
Start: 2024-12-06 | End: 2024-12-09

## 2024-12-06 RX ORDER — CIPROFLOXACIN 2 MG/ML
400 INJECTION, SOLUTION INTRAVENOUS
Status: DISCONTINUED | OUTPATIENT
Start: 2024-12-06 | End: 2024-12-07

## 2024-12-06 RX ORDER — SERTRALINE HYDROCHLORIDE 100 MG/1
100 TABLET, FILM COATED ORAL DAILY
Status: ON HOLD | COMMUNITY
End: 2024-12-09 | Stop reason: HOSPADM

## 2024-12-06 RX ORDER — BUSPIRONE HYDROCHLORIDE 5 MG/1
15 TABLET ORAL 3 TIMES DAILY
Status: DISCONTINUED | OUTPATIENT
Start: 2024-12-07 | End: 2024-12-09 | Stop reason: HOSPADM

## 2024-12-06 RX ADMIN — Medication 6 MG: at 10:12

## 2024-12-06 RX ADMIN — IOHEXOL 100 ML: 350 INJECTION, SOLUTION INTRAVENOUS at 05:12

## 2024-12-06 RX ADMIN — ONDANSETRON 4 MG: 2 INJECTION INTRAMUSCULAR; INTRAVENOUS at 08:12

## 2024-12-06 RX ADMIN — METHYLPREDNISOLONE SODIUM SUCCINATE 40 MG: 40 INJECTION, POWDER, FOR SOLUTION INTRAMUSCULAR; INTRAVENOUS at 10:12

## 2024-12-06 RX ADMIN — FAMOTIDINE 20 MG: 20 TABLET ORAL at 10:12

## 2024-12-06 RX ADMIN — SODIUM CHLORIDE: 9 INJECTION, SOLUTION INTRAVENOUS at 09:12

## 2024-12-06 RX ADMIN — CIPROFLOXACIN 400 MG: 2 INJECTION, SOLUTION INTRAVENOUS at 10:12

## 2024-12-06 RX ADMIN — METRONIDAZOLE 500 MG: 500 INJECTION, SOLUTION INTRAVENOUS at 10:12

## 2024-12-06 RX ADMIN — HYDROCODONE BITARTRATE AND ACETAMINOPHEN 1 TABLET: 5; 325 TABLET ORAL at 10:12

## 2024-12-06 RX ADMIN — HYDROMORPHONE HYDROCHLORIDE 1 MG: 1 INJECTION, SOLUTION INTRAMUSCULAR; INTRAVENOUS; SUBCUTANEOUS at 08:12

## 2024-12-06 RX ADMIN — ZOLPIDEM TARTRATE 10 MG: 5 TABLET, COATED ORAL at 10:12

## 2024-12-06 NOTE — ED PROVIDER NOTES
Encounter Date: 12/6/2024       History     Chief Complaint   Patient presents with    Abdominal Pain     X FEW DAYS    Rectal Bleeding     49-year-old male history of C diff colitis, ulcerative colitis diabetes hypertension hyperlipidemia presents with several days of abdominal cramps bloody stools and vomiting consistent with ulcerative colitis flare.  GI doc is Dr. Jean.  He reports a 10 episodes of bloody stools today and 1 episode of vomiting.  He gets regular IV infusions for his ulcerative colitis.    The history is provided by the patient.     Review of patient's allergies indicates:  No Known Allergies  Past Medical History:   Diagnosis Date    C. difficile colitis     Cavitary pneumonia 11/2023    pos aspergillus serology    Diabetes mellitus 01/2022    Hyperlipidemia 2015    Hypertension 2015    Insomnia 2015    Ulcerative colitis      Past Surgical History:   Procedure Laterality Date    BRONCHOSCOPY WITH FLUOROSCOPY Left 11/20/2023    Procedure: BRONCHOSCOPY, WITH FLUOROSCOPY;  Surgeon: Lisa Villarreal MD;  Location: [facility];  Service: Pulmonary;  Laterality: Left;    BRONCHOSCOPY WITH FLUOROSCOPY N/A 11/30/2023    Procedure: BRONCHOSCOPY, WITH FLUOROSCOPY;  Surgeon: Lisa Villarreal MD;  Location: [facility];  Service: Pulmonary;  Laterality: N/A;    CARDIAC ELECTROPHYSIOLOGY MAPPING AND ABLATION  2017    SVT    CHOLECYSTECTOMY  2011    COLONOSCOPY N/A 5/3/2022    Procedure: COLONOSCOPY;  Surgeon: Shan Jean III, MD;  Location: [facility];  Service: Endoscopy;  Laterality: N/A;    COLONOSCOPY N/A 3/16/2024    Procedure: COLONOSCOPY;  Surgeon: ALEKSANDER Ramos MD;  Location: [facility];  Service: Endoscopy;  Laterality: N/A;    COLONOSCOPY WITH FECAL MICROBIOTA TRANSFER N/A 3/21/2023    Procedure: COLONOSCOPY, WITH FECAL MICROBIOTA TRANSFER;  Surgeon: David Millan MD;  Location: [facility];  Service: Endoscopy;  Laterality: N/A;    ESOPHAGOGASTRODUODENOSCOPY N/A 4/24/2023     Procedure: EGD (ESOPHAGOGASTRODUODENOSCOPY);  Surgeon: Shan Jean III, MD;  Location: [facility];  Service: Endoscopy;  Laterality: N/A;    ESOPHAGOGASTRODUODENOSCOPY N/A 6/5/2024    Procedure: EGD (ESOPHAGOGASTRODUODENOSCOPY);  Surgeon: Odalis Mccabe MD;  Location: [facility];  Service: Endoscopy;  Laterality: N/A;    FLEXIBLE SIGMOIDOSCOPY N/A 6/5/2024    Procedure: SIGMOIDOSCOPY, FLEXIBLE;  Surgeon: Odalis Mccabe MD;  Location: [facility];  Service: Endoscopy;  Laterality: N/A;    FLEXIBLE SIGMOIDOSCOPY N/A 7/16/2024    Procedure: SIGMOIDOSCOPY, FLEXIBLE;  Surgeon: Shan Jean III, MD;  Location: [facility];  Service: Endoscopy;  Laterality: N/A;    FLEXIBLE SIGMOIDOSCOPY N/A 8/13/2024    Procedure: SIGMOIDOSCOPY, FLEXIBLE;  Surgeon: Shan Jean III, MD;  Location: [facility];  Service: Endoscopy;  Laterality: N/A;    GANGLION CYST EXCISION Left 1992    UMBILICAL HERNIA REPAIR  2011    with mesh     Family History   Problem Relation Name Age of Onset    Asthma Mother       Social History     Tobacco Use    Smoking status: Former     Average packs/day: 1 pack/day for 30.0 years (30.0 ttl pk-yrs)     Types: Cigarettes     Start date: 1993    Smokeless tobacco: Never    Tobacco comments:     Pt states he has stopped smoking with Chantix three weeks ago. 12/1/23   Substance Use Topics    Alcohol use: Yes     Comment: ocass    Drug use: Not Currently     Review of Systems   Gastrointestinal:  Positive for blood in stool, diarrhea, nausea and vomiting.       Physical Exam     Initial Vitals [12/06/24 1533]   BP Pulse Resp Temp SpO2   139/64 85 18 98.3 °F (36.8 °C) 100 %      MAP       --         Physical Exam    Nursing note and vitals reviewed.  Constitutional: He appears well-developed and well-nourished. He is not diaphoretic.  Non-toxic appearance. He does not have a sickly appearance. He does not appear ill. No distress.   HENT:   Head: Normocephalic and atraumatic.   Eyes: EOM are  normal.   Neck: Neck supple.   Normal range of motion.  Cardiovascular:  Normal rate, regular rhythm and normal heart sounds.     Exam reveals no gallop and no friction rub.       No murmur heard.  Pulmonary/Chest: Breath sounds normal. No respiratory distress. He has no wheezes. He has no rhonchi. He has no rales.   Abdominal: He exhibits no distension. There is abdominal tenderness in the left lower quadrant. There is no rebound and no guarding.   Musculoskeletal:         General: Normal range of motion.      Cervical back: Normal range of motion and neck supple. No rigidity. Normal range of motion.     Neurological: He is alert and oriented to person, place, and time.   Skin: Skin is warm and dry. No rash noted.   Psychiatric: He has a normal mood and affect. His behavior is normal. Judgment and thought content normal.         ED Course   Procedures  Labs Reviewed   CBC W/ AUTO DIFFERENTIAL - Abnormal       Result Value    WBC 13.24 (*)     RBC 3.62 (*)     Hemoglobin 10.0 (*)     Hematocrit 33.1 (*)     MCV 91      MCH 27.6      MCHC 30.2 (*)     RDW 17.0 (*)     Platelets 257      MPV 11.2      Immature Granulocytes 0.6 (*)     Gran # (ANC) 10.1 (*)     Immature Grans (Abs) 0.08 (*)     Lymph # 1.7      Mono # 1.0      Eos # 0.3      Baso # 0.05      nRBC 0      Gran % 76.5 (*)     Lymph % 12.5 (*)     Mono % 7.9      Eosinophil % 2.1      Basophil % 0.4      Differential Method Automated     COMPREHENSIVE METABOLIC PANEL - Abnormal    Sodium 138      Potassium 5.2 (*)     Chloride 104      CO2 29      Glucose 116 (*)     BUN 19      Creatinine 1.2      Calcium 9.2      Total Protein 7.2      Albumin 4.0      Total Bilirubin 0.3      Alkaline Phosphatase 62      AST 20      ALT 15      eGFR >60.0      Anion Gap 5 (*)    SEDIMENTATION RATE - Abnormal    Sed Rate 50 (*)    C-REACTIVE PROTEIN - Abnormal    CRP 1.90 (*)    LIPASE    Lipase 12     URINALYSIS, REFLEX TO URINE CULTURE    Specimen UA Urine, Clean Catch       Color, UA Yellow      Appearance, UA Clear      pH, UA 7.0      Specific Gravity, UA 1.020      Protein, UA Negative      Glucose, UA Negative      Ketones, UA Negative      Bilirubin (UA) Negative      Occult Blood UA Negative      Nitrite, UA Negative      Urobilinogen, UA Negative      Leukocytes, UA Negative      Narrative:     Specimen Source->Urine   SEDIMENTATION RATE   C-REACTIVE PROTEIN   VITAMIN B12    Vitamin B-12 335            Imaging Results              CT Abdomen Pelvis With IV Contrast NO Oral Contrast (Final result)  Result time 12/06/24 20:06:36      Final result by Mirta Gayle MD (12/06/24 20:06:36)                   Impression:      Mild infectious/inflammatory colitis from the splenic flexure to the rectosigmoid junction. Findings slightly improved from prior.  Follow-up colonoscopy advised.      Electronically signed by: Mirta Gayle  Date:    12/06/2024  Time:    20:06               Narrative:    EXAMINATION:  CT ABDOMEN PELVIS WITH IV CONTRAST    CLINICAL HISTORY:  LLQ abdominal pain;    TECHNIQUE:  Low dose axial images, sagittal and coronal reformations were obtained from the lung bases to the pubic symphysis following the IV administration of 100 mL of Omnipaque 350 .  Oral contrast was not given.    COMPARISON:  11/17/2024    FINDINGS:  Soft tissues: Unremarkable.    Bones: No acute osseous abnormality.    Lower chest: Normal heart size. No pericardial effusion.    Lung Bases: Well aerated, without consolidation or pleural fluid.    Liver: Normal in size and attenuation, with no focal hepatic lesions.    Gallbladder: Surgically absent    Bile Ducts: No evidence of dilated ducts.    Pancreas: No mass or peripancreatic fat stranding.    Spleen: Unremarkable.    Adrenals: Unremarkable.    Kidneys/ Ureters: Nonobstructive left nephrolithiasis.    Bladder: No evidence of wall thickening.    Reproductive organs: Unremarkable.    GI Tract/Mesentery: Mild  infectious/inflammatory colitis from the splenic flexure to the rectosigmoid junction.  Findings slightly improved from prior.    Peritoneal Space: No ascites. No free air.    Lymphadenopathy: No significant adenopathy.    Vasculature: No significant atherosclerosis or aneurysm.                                       Medications   HYDROmorphone injection 1 mg (1 mg Intravenous Given 12/6/24 2010)   ondansetron injection 4 mg (4 mg Intravenous Given 12/6/24 2010)   iohexoL (OMNIPAQUE 350) injection 100 mL (100 mLs Intravenous Given 12/6/24 1757)     Medical Decision Making  49-year-old male presents to the ER with bloody diarrhea.  He has a history of ulcerative colitis.  He is complaining of abdominal pain.  CT demonstrates colitis.  Patient feels like he needs to be admitted.  He will be admitted to Hospital Medicine.  I have tried to call GI but they have not yet called back.    Amount and/or Complexity of Data Reviewed  External Data Reviewed: labs, radiology and notes.  Labs:  Decision-making details documented in ED Course.  Radiology:  Decision-making details documented in ED Course.    Risk  Prescription drug management.  Parenteral controlled substances.  Decision regarding hospitalization.               ED Course as of 12/06/24 2117   Fri Dec 06, 2024   1611 BP: 139/64 [EF]   1611 Temp: 98.3 °F (36.8 °C) [EF]   1611 Temp Source: Oral [EF]   1611 Pulse: 85 [EF]   1611 SpO2: 100 % [EF]   1741 CRP(!): 1.90 [EF]   1741 WBC(!): 13.24 [EF]   1741 Hemoglobin(!): 10.0 [EF]   1741 Platelet Count: 257 [EF]   1757 Sed Rate(!): 50 [EF]   2014 CT Abdomen Pelvis With IV Contrast NO Oral Contrast [EF]   2105 Dr worley to admit [EF]      ED Course User Index  [EF] Marcelino Garcia MD                           Clinical Impression:  Final diagnoses:  [K51.919] Ulcerative colitis with complication, unspecified location (Primary)  [K51.90] Ulcerative colitis          ED Disposition Condition    Observation Stable                 Marcelino Garcia MD  12/06/24 5868

## 2024-12-06 NOTE — FIRST PROVIDER EVALUATION
Emergency Department TeleTriage Encounter Note      CHIEF COMPLAINT    Chief Complaint   Patient presents with    Abdominal Pain     X FEW DAYS    Rectal Bleeding       VITAL SIGNS   Initial Vitals [12/06/24 1533]   BP Pulse Resp Temp SpO2   139/64 85 18 98.3 °F (36.8 °C) 100 %      MAP       --            ALLERGIES    Review of patient's allergies indicates:  No Known Allergies    PROVIDER TRIAGE NOTE  This is a teletriage evaluation of a 49 y.o. male presenting to the ED complaining of abdominal pain. Patient reports LLQ abdominal pain with nausea, vomiting, and bloody diarrhea. Patient with history of ulcerative colitis.    Patient is alert and oriented. He speaks in complete sentences. He is sitting upright in the chair in no distress.     Initial orders will be placed and care will be transferred to an alternate provider when patient is roomed for a full evaluation. Any additional orders and the final disposition will be determined by that provider.         ORDERS  Labs Reviewed   CBC W/ AUTO DIFFERENTIAL   COMPREHENSIVE METABOLIC PANEL   LIPASE   URINALYSIS, REFLEX TO URINE CULTURE       ED Orders (720h ago, onward)      Start Ordered     Status Ordering Provider    12/06/24 1550 12/06/24 1549  Vital signs  Every 2 hours         Ordered RUFINA, CLAUDIA G.    12/06/24 1550 12/06/24 1549  Diet NPO  Diet effective now         Ordered RUFINA, CLAUDIA G.    12/06/24 1550 12/06/24 1549  Insert peripheral IV  Once         Ordered RUFINA, CLAUDIA G.    12/06/24 1550 12/06/24 1549  CBC W/ AUTO DIFFERENTIAL  STAT         Ordered RUFINA, CLAUDIA G.    12/06/24 1550 12/06/24 1549  Comp. Metabolic Panel  STAT         Ordered RUFINA, CLAUDIA G.    12/06/24 1550 12/06/24 1549  Lipase  STAT         Ordered RUFINA, CLAUDIA G.    12/06/24 1550 12/06/24 1549  Urinalysis, Reflex to Urine Culture Urine, Clean Catch  STAT         Ordered RUFINA, CLAUDIA G.    12/06/24 1550 12/06/24 1550  CT Abdomen Pelvis With IV Contrast NO Oral Contrast  1 time  imaging         Ordered CLAUDIA ALMARAZ              Virtual Visit Note: The provider triage portion of this emergency department evaluation and documentation was performed via [employer], a HIPAA-compliant telemedicine application, in concert with a tele-presenter in the room. A face to face patient evaluation with one of my colleagues will occur once the patient is placed in an emergency department room.      DISCLAIMER: This note was prepared with [employer] voice recognition transcription software. Garbled syntax, mangled pronouns, and other bizarre constructions may be attributed to that software system.

## 2024-12-07 LAB
ALBUMIN SERPL BCP-MCNC: 3.9 G/DL (ref 3.5–5.2)
ALP SERPL-CCNC: 65 U/L (ref 55–135)
ALT SERPL W/O P-5'-P-CCNC: 14 U/L (ref 10–44)
ANION GAP SERPL CALC-SCNC: 4 MMOL/L (ref 8–16)
AST SERPL-CCNC: 15 U/L (ref 10–40)
BASOPHILS # BLD AUTO: 0.01 K/UL (ref 0–0.2)
BASOPHILS NFR BLD: 0.1 % (ref 0–1.9)
BILIRUB SERPL-MCNC: 0.4 MG/DL (ref 0.1–1)
BUN SERPL-MCNC: 18 MG/DL (ref 6–20)
CALCIUM SERPL-MCNC: 9.2 MG/DL (ref 8.7–10.5)
CHLORIDE SERPL-SCNC: 101 MMOL/L (ref 95–110)
CO2 SERPL-SCNC: 28 MMOL/L (ref 23–29)
CREAT SERPL-MCNC: 1.1 MG/DL (ref 0.5–1.4)
DIFFERENTIAL METHOD BLD: ABNORMAL
EOSINOPHIL # BLD AUTO: 0 K/UL (ref 0–0.5)
EOSINOPHIL NFR BLD: 0.1 % (ref 0–8)
ERYTHROCYTE [DISTWIDTH] IN BLOOD BY AUTOMATED COUNT: 16.5 % (ref 11.5–14.5)
EST. GFR  (NO RACE VARIABLE): >60 ML/MIN/1.73 M^2
GLUCOSE SERPL-MCNC: 157 MG/DL (ref 70–110)
HCT VFR BLD AUTO: 33.3 % (ref 40–54)
HGB BLD-MCNC: 10 G/DL (ref 14–18)
IMM GRANULOCYTES # BLD AUTO: 0.04 K/UL (ref 0–0.04)
IMM GRANULOCYTES NFR BLD AUTO: 0.4 % (ref 0–0.5)
LYMPHOCYTES # BLD AUTO: 0.7 K/UL (ref 1–4.8)
LYMPHOCYTES NFR BLD: 7.3 % (ref 18–48)
MAGNESIUM SERPL-MCNC: 1.6 MG/DL (ref 1.6–2.6)
MCH RBC QN AUTO: 26.9 PG (ref 27–31)
MCHC RBC AUTO-ENTMCNC: 30 G/DL (ref 32–36)
MCV RBC AUTO: 90 FL (ref 82–98)
MONOCYTES # BLD AUTO: 0.2 K/UL (ref 0.3–1)
MONOCYTES NFR BLD: 2 % (ref 4–15)
NEUTROPHILS # BLD AUTO: 8.4 K/UL (ref 1.8–7.7)
NEUTROPHILS NFR BLD: 90.1 % (ref 38–73)
NRBC BLD-RTO: 0 /100 WBC
PLATELET # BLD AUTO: 248 K/UL (ref 150–450)
PMV BLD AUTO: 11.1 FL (ref 9.2–12.9)
POTASSIUM SERPL-SCNC: 5.6 MMOL/L (ref 3.5–5.1)
PROT SERPL-MCNC: 7.1 G/DL (ref 6–8.4)
RBC # BLD AUTO: 3.72 M/UL (ref 4.6–6.2)
SODIUM SERPL-SCNC: 133 MMOL/L (ref 136–145)
WBC # BLD AUTO: 9.29 K/UL (ref 3.9–12.7)

## 2024-12-07 PROCEDURE — G0378 HOSPITAL OBSERVATION PER HR: HCPCS

## 2024-12-07 PROCEDURE — 96366 THER/PROPH/DIAG IV INF ADDON: CPT

## 2024-12-07 PROCEDURE — 85025 COMPLETE CBC W/AUTO DIFF WBC: CPT | Performed by: INTERNAL MEDICINE

## 2024-12-07 PROCEDURE — 83735 ASSAY OF MAGNESIUM: CPT | Performed by: INTERNAL MEDICINE

## 2024-12-07 PROCEDURE — 63600175 PHARM REV CODE 636 W HCPCS: Mod: JZ,JG | Performed by: INTERNAL MEDICINE

## 2024-12-07 PROCEDURE — 36415 COLL VENOUS BLD VENIPUNCTURE: CPT | Performed by: INTERNAL MEDICINE

## 2024-12-07 PROCEDURE — 80053 COMPREHEN METABOLIC PANEL: CPT | Performed by: INTERNAL MEDICINE

## 2024-12-07 PROCEDURE — 25000003 PHARM REV CODE 250: Performed by: INTERNAL MEDICINE

## 2024-12-07 PROCEDURE — 96376 TX/PRO/DX INJ SAME DRUG ADON: CPT

## 2024-12-07 RX ADMIN — RIVAROXABAN 20 MG: 10 TABLET, FILM COATED ORAL at 05:12

## 2024-12-07 RX ADMIN — METRONIDAZOLE 500 MG: 500 INJECTION, SOLUTION INTRAVENOUS at 03:12

## 2024-12-07 RX ADMIN — FAMOTIDINE 20 MG: 20 TABLET ORAL at 08:12

## 2024-12-07 RX ADMIN — HYDROCODONE BITARTRATE AND ACETAMINOPHEN 1 TABLET: 5; 325 TABLET ORAL at 08:12

## 2024-12-07 RX ADMIN — HYDROCODONE BITARTRATE AND ACETAMINOPHEN 1 TABLET: 5; 325 TABLET ORAL at 05:12

## 2024-12-07 RX ADMIN — HYDROMORPHONE HYDROCHLORIDE 1 MG: 1 INJECTION, SOLUTION INTRAMUSCULAR; INTRAVENOUS; SUBCUTANEOUS at 09:12

## 2024-12-07 RX ADMIN — ONDANSETRON 4 MG: 2 INJECTION INTRAMUSCULAR; INTRAVENOUS at 05:12

## 2024-12-07 RX ADMIN — ZOLPIDEM TARTRATE 10 MG: 5 TABLET, COATED ORAL at 09:12

## 2024-12-07 RX ADMIN — HYDROCODONE BITARTRATE AND ACETAMINOPHEN 1 TABLET: 5; 325 TABLET ORAL at 11:12

## 2024-12-07 RX ADMIN — FAMOTIDINE 20 MG: 20 TABLET ORAL at 09:12

## 2024-12-07 RX ADMIN — METHYLPREDNISOLONE SODIUM SUCCINATE 40 MG: 40 INJECTION, POWDER, FOR SOLUTION INTRAMUSCULAR; INTRAVENOUS at 10:12

## 2024-12-07 RX ADMIN — ESCITALOPRAM OXALATE 10 MG: 10 TABLET ORAL at 08:12

## 2024-12-07 RX ADMIN — BUSPIRONE HYDROCHLORIDE 15 MG: 5 TABLET ORAL at 09:12

## 2024-12-07 RX ADMIN — Medication 6 MG: at 11:12

## 2024-12-07 RX ADMIN — METRONIDAZOLE 500 MG: 500 INJECTION, SOLUTION INTRAVENOUS at 06:12

## 2024-12-07 RX ADMIN — HYDROMORPHONE HYDROCHLORIDE 1 MG: 1 INJECTION, SOLUTION INTRAMUSCULAR; INTRAVENOUS; SUBCUTANEOUS at 12:12

## 2024-12-07 RX ADMIN — HYDROMORPHONE HYDROCHLORIDE 1 MG: 1 INJECTION, SOLUTION INTRAMUSCULAR; INTRAVENOUS; SUBCUTANEOUS at 04:12

## 2024-12-07 RX ADMIN — BUSPIRONE HYDROCHLORIDE 15 MG: 5 TABLET ORAL at 03:12

## 2024-12-07 RX ADMIN — HYDROMORPHONE HYDROCHLORIDE 1 MG: 1 INJECTION, SOLUTION INTRAMUSCULAR; INTRAVENOUS; SUBCUTANEOUS at 03:12

## 2024-12-07 RX ADMIN — BUSPIRONE HYDROCHLORIDE 15 MG: 5 TABLET ORAL at 08:12

## 2024-12-07 RX ADMIN — METHYLPREDNISOLONE SODIUM SUCCINATE 40 MG: 40 INJECTION, POWDER, FOR SOLUTION INTRAMUSCULAR; INTRAVENOUS at 09:12

## 2024-12-07 RX ADMIN — ONDANSETRON 4 MG: 2 INJECTION INTRAMUSCULAR; INTRAVENOUS at 06:12

## 2024-12-07 RX ADMIN — CIPROFLOXACIN 400 MG: 2 INJECTION, SOLUTION INTRAVENOUS at 09:12

## 2024-12-07 NOTE — H&P
[facility]  [facility] Medicine  History & Physical    Patient Name: Jacoby Jean-Baptiste  MRN: [id number]  Patient Class: OP- Observation  Admission Date: 12/6/2024  Attending Physician: Noe Juarez MD   Primary Care Provider: Hunter Aguilar III, MD         Patient information was obtained from patient, past medical records, and ER records.     Subjective:     Principal Problem:Exacerbation of ulcerative colitis    Chief Complaint:   Chief Complaint   Patient presents with    Abdominal Pain     X FEW DAYS    Rectal Bleeding        HPI: 49 year old pt getting admitted with ulcerative colitis flare  Abdominal pain and diarrhea due to UC is a chronic normal issue  for pt   Today pt started having bloody stools and came to ER and got admitted  He was recently started on an inj for UC and just had first dose ,few days ago  Denied any other issues   Pt was on PO Budesonide with tapered doses few weeks ago    Past Medical History:   Diagnosis Date    C. difficile colitis     Cavitary pneumonia 11/2023    pos aspergillus serology    Diabetes mellitus 01/2022    Hyperlipidemia 2015    Hypertension 2015    Insomnia 2015    Ulcerative colitis        Past Surgical History:   Procedure Laterality Date    BRONCHOSCOPY WITH FLUOROSCOPY Left 11/20/2023    Procedure: BRONCHOSCOPY, WITH FLUOROSCOPY;  Surgeon: Lisa Villarreal MD;  Location: [facility];  Service: Pulmonary;  Laterality: Left;    BRONCHOSCOPY WITH FLUOROSCOPY N/A 11/30/2023    Procedure: BRONCHOSCOPY, WITH FLUOROSCOPY;  Surgeon: Lisa Villarreal MD;  Location: [facility] ENDO;  Service: Pulmonary;  Laterality: N/A;    CARDIAC ELECTROPHYSIOLOGY MAPPING AND ABLATION  2017    SVT    CHOLECYSTECTOMY  2011    COLONOSCOPY N/A 5/3/2022    Procedure: COLONOSCOPY;  Surgeon: Shan Jean III, MD;  Location: [facility];  Service: Endoscopy;  Laterality: N/A;    COLONOSCOPY N/A 3/16/2024    Procedure: COLONOSCOPY;  Surgeon: ALEKSANDER Ramos,  MD;  Location: [facility];  Service: Endoscopy;  Laterality: N/A;    COLONOSCOPY WITH FECAL MICROBIOTA TRANSFER N/A 3/21/2023    Procedure: COLONOSCOPY, WITH FECAL MICROBIOTA TRANSFER;  Surgeon: David Millan MD;  Location: [facility];  Service: Endoscopy;  Laterality: N/A;    ESOPHAGOGASTRODUODENOSCOPY N/A 4/24/2023    Procedure: EGD (ESOPHAGOGASTRODUODENOSCOPY);  Surgeon: Shan Jean III, MD;  Location: [facility];  Service: Endoscopy;  Laterality: N/A;    ESOPHAGOGASTRODUODENOSCOPY N/A 6/5/2024    Procedure: EGD (ESOPHAGOGASTRODUODENOSCOPY);  Surgeon: Odalis Mccabe MD;  Location: [facility];  Service: Endoscopy;  Laterality: N/A;    FLEXIBLE SIGMOIDOSCOPY N/A 6/5/2024    Procedure: SIGMOIDOSCOPY, FLEXIBLE;  Surgeon: Odalis Mccabe MD;  Location: [facility] ENDO;  Service: Endoscopy;  Laterality: N/A;    FLEXIBLE SIGMOIDOSCOPY N/A 7/16/2024    Procedure: SIGMOIDOSCOPY, FLEXIBLE;  Surgeon: Shan Jean III, MD;  Location: [facility];  Service: Endoscopy;  Laterality: N/A;    FLEXIBLE SIGMOIDOSCOPY N/A 8/13/2024    Procedure: SIGMOIDOSCOPY, FLEXIBLE;  Surgeon: Shan Jean III, MD;  Location: [facility];  Service: Endoscopy;  Laterality: N/A;    GANGLION CYST EXCISION Left 1992    UMBILICAL HERNIA REPAIR  2011    with mesh       Review of patient's allergies indicates:  No Known Allergies    Current Facility-Administered Medications on File Prior to Encounter   Medication    lactated ringers infusion     Current Outpatient Medications on File Prior to Encounter   Medication Sig    budesonide (ENTOCORT EC) 3 mg capsule Take 9 mg by mouth once daily.    HYDROcodone-acetaminophen (NORCO)  mg per tablet Take 1 tablet by mouth 4 (four) times daily.    busPIRone (BUSPAR) 15 MG tablet Take 1 tablet (15 mg total) by mouth 3 (three) times daily.    ciprofloxacin HCl (CIPRO) 500 MG tablet Take 1 tablet (500 mg total) by mouth every 12 (twelve) hours.    colchicine (COLCRYS) 0.6 mg tablet Take 1  tablet (0.6 mg total) by mouth 2 (two) times daily.    ergocalciferol (VITAMIN D2) 50,000 unit Cap Take 1 capsule (50,000 Units total) by mouth every 7 days.    EScitalopram oxalate (LEXAPRO) 10 MG tablet Take 1 tablet (10 mg total) by mouth once daily.    hyoscyamine (LEVBID) 0.375 mg Tb12 Take 0.375 mg by mouth 2 (two) times daily.    LORazepam (ATIVAN) 0.5 MG tablet Take 1 tablet (0.5 mg total) by mouth every 6 (six) hours as needed for Anxiety.    metoprolol succinate (TOPROL-XL) 50 MG 24 hr tablet Take 1 tablet (50 mg total) by mouth once daily.    metroNIDAZOLE (FLAGYL) 500 MG tablet Take 1 tablet (500 mg total) by mouth every 8 (eight) hours.    oxyCODONE-acetaminophen (PERCOCET) 7.5-325 mg per tablet Take 1 tablet by mouth every 8 (eight) hours as needed for Pain.    pantoprazole (PROTONIX) 40 MG tablet Take 40 mg by mouth 2 (two) times daily.    predniSONE (DELTASONE) 10 MG tablet Take 3 tablets (30 mg total) by mouth once daily for 5 days, THEN 2 tablets (20 mg total) once daily for 5 days, THEN 1 tablet (10 mg total) once daily for 5 days, THEN 0.5 tablets (5 mg total) once daily for 5 days.    promethazine (PHENERGAN) 25 MG tablet Take 25 mg by mouth 4 (four) times daily as needed for Nausea.    rivaroxaban (XARELTO) 20 mg Tab Take 1 tablet (20 mg total) by mouth daily with dinner or evening meal.    semaglutide (OZEMPIC) 0.25 mg or 0.5 mg (2 mg/3 mL) pen injector Inject 0.25 mg into the skin every 7 days.    simvastatin (ZOCOR) 20 MG tablet Take 1 tablet (20 mg total) by mouth every evening.    zolpidem (AMBIEN) 10 mg Tab Take 1 tablet (10 mg total) by mouth nightly as needed (insomnia).     Family History       Problem Relation (Age of Onset)    Asthma Mother          Tobacco Use    Smoking status: Former     Average packs/day: 1 pack/day for 30.0 years (30.0 ttl pk-yrs)     Types: Cigarettes     Start date: 1993    Smokeless tobacco: Never    Tobacco comments:     Pt states he has stopped smoking with  Chantix three weeks ago. 12/1/23   Substance and Sexual Activity    Alcohol use: Yes     Comment: ocass    Drug use: Not Currently    Sexual activity: Not Currently     Review of Systems   Constitutional:  Negative for activity change and appetite change.   HENT:  Negative for congestion and dental problem.    Eyes:  Negative for discharge and itching.   Respiratory:  Negative for shortness of breath.    Cardiovascular:  Negative for chest pain.   Gastrointestinal:  Positive for abdominal pain and diarrhea. Negative for abdominal distention.   Endocrine: Negative for cold intolerance.   Genitourinary:  Negative for difficulty urinating and dysuria.   Musculoskeletal:  Negative for arthralgias and back pain.   Skin:  Negative for color change.   Neurological:  Negative for dizziness and facial asymmetry.   Hematological:  Negative for adenopathy.   Psychiatric/Behavioral:  Negative for agitation and behavioral problems.      Objective:     Vital Signs (Most Recent):  Temp: 98.3 °F (36.8 °C) (12/06/24 1533)  Pulse: 85 (12/06/24 1533)  Resp: 18 (12/06/24 2010)  BP: 139/64 (12/06/24 1533)  SpO2: 100 % (12/06/24 1533) Vital Signs (24h Range):  Temp:  [98.3 °F (36.8 °C)] 98.3 °F (36.8 °C)  Pulse:  [85] 85  Resp:  [18] 18  SpO2:  [100 %] 100 %  BP: (139)/(64) 139/64     Weight: (!) 137.4 kg (303 lb)  Body mass index is 50.42 kg/m².     Physical Exam  Vitals and nursing note reviewed.   Constitutional:       Appearance: He is well-developed.   HENT:      Head: Atraumatic.      Right Ear: External ear normal.      Left Ear: External ear normal.      Nose: Nose normal.      Mouth/Throat:      Mouth: Mucous membranes are moist.   Cardiovascular:      Rate and Rhythm: Normal rate.   Pulmonary:      Effort: Pulmonary effort is normal.   Abdominal:      Palpations: Abdomen is soft.   Musculoskeletal:         General: Normal range of motion.      Cervical back: Full passive range of motion without pain and normal range of motion.    Skin:     General: Skin is warm.   Neurological:      Mental Status: He is alert and oriented to person, place, and time.   Psychiatric:         Behavior: Behavior normal.                Significant Labs: All pertinent labs within the past 24 hours have been reviewed.  CBC:   Recent Labs   Lab 12/06/24  1651   WBC 13.24*   HGB 10.0*   HCT 33.1*        CMP:   Recent Labs   Lab 12/06/24  1651      K 5.2*      CO2 29   *   BUN 19   CREATININE 1.2   CALCIUM 9.2   PROT 7.2   ALBUMIN 4.0   BILITOT 0.3   ALKPHOS 62   AST 20   ALT 15   ANIONGAP 5*       Significant Imaging: I have reviewed all pertinent imaging results/findings within the past 24 hours.    Assessment/Plan:     * Exacerbation of ulcerative colitis  Started on iv ciprofloxacin/iv flagyl/iv fluids  Also iv solumedrol BID and taper as needed       DVT (deep venous thrombosis)  H/o noted  Maintain xarelto  Last month pt had USS and report as follows  No thrombus in the central veins of the right upper extremity. Previously noted thrombus within the right basilic vein is no longer definitively visualized.   If GI bleed is an issue then NOAC should be on hold      Long-term use of immunosuppressant medication  Aware       PUD (peptic ulcer disease)  Aware  Maintain pepcid      History of Clostridioides difficile colitis  Aware  DC Isolation if tests are negative      Type 2 diabetes mellitus without complication, without long-term current use of insulin  Stable    Latest Reference Range & Units 04/21/23 05:25 10/27/23 06:41 03/16/24 14:04 06/06/24 04:51 09/11/24 10:48 11/02/24 04:56   Hemoglobin A1C External 4.5 - 6.2 % 4.8 5.2 5.7 5.2 4.9 5.8         Morbid obesity  Body mass index is 50.42 kg/m². Morbid obesity complicates all aspects of disease management from diagnostic modalities to treatment.       VTE Risk Mitigation (From admission, onward)           Ordered     rivaroxaban tablet 20 mg  With dinner         12/06/24 2117     IP  VTE HIGH RISK PATIENT  Once         12/06/24 2117     Place sequential compression device  Until discontinued         12/06/24 2117                       On 12/06/2024, patient should be placed in hospital observation services under my care.             Noe Juarez MD  Department of Hospital Medicine  [facility] - Emergency Dept

## 2024-12-07 NOTE — PROGRESS NOTES
[facility] Medicine  Progress Note    Patient Name: Jacoby Jean-Baptiste  MRN: [id number]  Patient Class: OP- Observation   Admission Date: 12/6/2024  Length of Stay: 0 days  Attending Physician: Sofie Prasad MD  Primary Care Provider: Hunter Aguilar III, MD        Subjective     Principal Problem:Exacerbation of ulcerative colitis        HPI:  49 year old pt getting admitted with ulcerative colitis flare  Abdominal pain and diarrhea due to UC is a chronic normal issue  for pt   Today pt started having bloody stools and came to ER and got admitted  He was recently started on an inj for UC and just had first dose ,few days ago  Denied any other issues   Pt was on PO Budesonide with tapered doses few weeks ago    Overview/Hospital Course:  No notes on file    Interval History:  Patient seen and examined this morning resting comfortably in no acute distress.  He reported having several episodes bloody stools 2 days prior to his admission but has not had any so far today.  He is afebrile and hemodynamically stable continued on IV fluids and steroids.    Review of Systems   Constitutional:  Negative for activity change and appetite change.   HENT:  Negative for congestion and dental problem.    Eyes:  Negative for discharge and itching.   Respiratory:  Negative for shortness of breath.    Cardiovascular:  Negative for chest pain.   Gastrointestinal:  Positive for abdominal pain. Negative for abdominal distention and diarrhea.   Endocrine: Negative for cold intolerance.   Genitourinary:  Negative for difficulty urinating and dysuria.   Musculoskeletal:  Negative for arthralgias and back pain.   Skin:  Negative for color change.   Neurological:  Negative for dizziness and facial asymmetry.   Hematological:  Negative for adenopathy.   Psychiatric/Behavioral:  Negative for agitation and behavioral problems.      Objective:     Vital Signs (Most Recent):  Temp: 97.9 °F (36.6 °C) (12/07/24  1540)  Pulse: 70 (12/07/24 1540)  Resp: 16 (12/07/24 1540)  BP: (!) 158/88 (12/07/24 1540)  SpO2: 98 % (12/07/24 1540) Vital Signs (24h Range):  Temp:  [97.3 °F (36.3 °C)-98.2 °F (36.8 °C)] 97.9 °F (36.6 °C)  Pulse:  [69-80] 70  Resp:  [16-18] 16  SpO2:  [96 %-100 %] 98 %  BP: (136-158)/(68-96) 158/88     Weight: 134.1 kg (295 lb 12 oz)  Body mass index is 49.21 kg/m².    Intake/Output Summary (Last 24 hours) at 12/7/2024 1644  Last data filed at 12/7/2024 1321  Gross per 24 hour   Intake 840 ml   Output --   Net 840 ml         Physical Exam  Vitals and nursing note reviewed.   Constitutional:       Appearance: He is well-developed.   HENT:      Head: Atraumatic.      Right Ear: External ear normal.      Left Ear: External ear normal.      Nose: Nose normal.      Mouth/Throat:      Mouth: Mucous membranes are moist.   Cardiovascular:      Rate and Rhythm: Normal rate.   Pulmonary:      Effort: Pulmonary effort is normal.   Abdominal:      Palpations: Abdomen is soft.   Musculoskeletal:         General: Normal range of motion.      Cervical back: Full passive range of motion without pain and normal range of motion.   Skin:     General: Skin is warm.   Neurological:      Mental Status: He is alert and oriented to person, place, and time.   Psychiatric:         Behavior: Behavior normal.             Significant Labs: All pertinent labs within the past 24 hours have been reviewed.  BMP:   Recent Labs   Lab 12/07/24 0724   *   *   K 5.6*      CO2 28   BUN 18   CREATININE 1.1   CALCIUM 9.2   MG 1.6     CBC:   Recent Labs   Lab 12/06/24  1651 12/07/24 0724   WBC 13.24* 9.29   HGB 10.0* 10.0*   HCT 33.1* 33.3*    248       Significant Imaging: I have reviewed all pertinent imaging results/findings within the past 24 hours.    Assessment and Plan     * Exacerbation of ulcerative colitis  Continue steroids, discontinued antibiotics, Gastroenterology has been consulted and have ordered fecal  calprotectin    Overall patient without any additional episodes of bloody stools today      DVT (deep venous thrombosis)  H/o noted  Maintain xarelto  Last month pt had USS and report as follows  No thrombus in the central veins of the right upper extremity. Previously noted thrombus within the right basilic vein is no longer definitively visualized.         Long-term use of immunosuppressant medication  Aware       PUD (peptic ulcer disease)  Aware  Maintain pepcid      History of Clostridioides difficile colitis  C diff antigen with reflex to toxin ordered      Type 2 diabetes mellitus without complication, without long-term current use of insulin  Stable    Latest Reference Range & Units 04/21/23 05:25 10/27/23 06:41 03/16/24 14:04 06/06/24 04:51 09/11/24 10:48 11/02/24 04:56   Hemoglobin A1C External 4.5 - 6.2 % 4.8 5.2 5.7 5.2 4.9 5.8         Morbid obesity  Body mass index is 50.42 kg/m². Morbid obesity complicates all aspects of disease management from diagnostic modalities to treatment.       VTE Risk Mitigation (From admission, onward)           Ordered     rivaroxaban tablet 20 mg  With dinner         12/06/24 2117     IP VTE HIGH RISK PATIENT  Once         12/06/24 2117     Place sequential compression device  Until discontinued         12/06/24 2117                    Discharge Planning   GERARDO: 12/9/2024     Code Status: Full Code   Medical Readiness for Discharge Date:   Discharge Plan A: Home                        Sofie Prasad MD  Department of Hospital Medicine   [facility]

## 2024-12-07 NOTE — H&P
Consult Note  Gastroenterology    Consult Requested By: hospital medicine  Reason for Consult: refractory ulcerative colitis    SUBJECTIVE:     History of Present Illness:  Patient is a 49 y.o. male presents with recurrent typical UC flair sx's.  His has been a difficult case - he developed fungal pneumonia on Remicade, has been unresponsive to Entyvio and Omvie and has been more or less steroid dependent to control his disease.  He has type 2 DM, is obese and has gained 30 lb's on the prednisone .  He was recently weaned off the prednisone and is on oral Budesonide  - has also received his first infusion of Tremfya. Currently started on solumedrol and abx.Onset of symptoms was gradual starting several weeks ago with gradually worsening course since that time. Patient denies constipation, dysphagia, jaundice, and melena. Symptoms are aggravated by none. Symptoms improve with none. Past history includes as above. Previous studies include colonoscopy.     Review of patient's allergies indicates:  No Known Allergies    Past Medical History:   Diagnosis Date    C. difficile colitis     Cavitary pneumonia 11/2023    pos aspergillus serology    Diabetes mellitus 01/2022    Hyperlipidemia 2015    Hypertension 2015    Insomnia 2015    Ulcerative colitis      Past Surgical History:   Procedure Laterality Date    BRONCHOSCOPY WITH FLUOROSCOPY Left 11/20/2023    Procedure: BRONCHOSCOPY, WITH FLUOROSCOPY;  Surgeon: Lisa Villarreal MD;  Location: [facility];  Service: Pulmonary;  Laterality: Left;    BRONCHOSCOPY WITH FLUOROSCOPY N/A 11/30/2023    Procedure: BRONCHOSCOPY, WITH FLUOROSCOPY;  Surgeon: Lisa Villarreal MD;  Location: [facility];  Service: Pulmonary;  Laterality: N/A;    CARDIAC ELECTROPHYSIOLOGY MAPPING AND ABLATION  2017    SVT    CHOLECYSTECTOMY  2011    COLONOSCOPY N/A 5/3/2022    Procedure: COLONOSCOPY;  Surgeon: Shan Jean III, MD;  Location: [facility];  Service: Endoscopy;  Laterality: N/A;     COLONOSCOPY N/A 3/16/2024    Procedure: COLONOSCOPY;  Surgeon: ALEKSANDER Ramos MD;  Location: [facility];  Service: Endoscopy;  Laterality: N/A;    COLONOSCOPY WITH FECAL MICROBIOTA TRANSFER N/A 3/21/2023    Procedure: COLONOSCOPY, WITH FECAL MICROBIOTA TRANSFER;  Surgeon: David Millan MD;  Location: [facility];  Service: Endoscopy;  Laterality: N/A;    ESOPHAGOGASTRODUODENOSCOPY N/A 4/24/2023    Procedure: EGD (ESOPHAGOGASTRODUODENOSCOPY);  Surgeon: Shan Jean III, MD;  Location: [facility];  Service: Endoscopy;  Laterality: N/A;    ESOPHAGOGASTRODUODENOSCOPY N/A 6/5/2024    Procedure: EGD (ESOPHAGOGASTRODUODENOSCOPY);  Surgeon: Odalis Mccabe MD;  Location: [facility];  Service: Endoscopy;  Laterality: N/A;    FLEXIBLE SIGMOIDOSCOPY N/A 6/5/2024    Procedure: SIGMOIDOSCOPY, FLEXIBLE;  Surgeon: Odalis Mccabe MD;  Location: [facility];  Service: Endoscopy;  Laterality: N/A;    FLEXIBLE SIGMOIDOSCOPY N/A 7/16/2024    Procedure: SIGMOIDOSCOPY, FLEXIBLE;  Surgeon: Shan Jean III, MD;  Location: [facility];  Service: Endoscopy;  Laterality: N/A;    FLEXIBLE SIGMOIDOSCOPY N/A 8/13/2024    Procedure: SIGMOIDOSCOPY, FLEXIBLE;  Surgeon: Shan Jean III, MD;  Location: [facility];  Service: Endoscopy;  Laterality: N/A;    GANGLION CYST EXCISION Left 1992    UMBILICAL HERNIA REPAIR  2011    with mesh     Family History   Problem Relation Name Age of Onset    Asthma Mother       Social History     Tobacco Use    Smoking status: Former     Average packs/day: 1 pack/day for 30.0 years (30.0 ttl pk-yrs)     Types: Cigarettes     Start date: 1993    Smokeless tobacco: Never    Tobacco comments:     Pt states he has stopped smoking with Chantix three weeks ago. 12/1/23   Substance Use Topics    Alcohol use: Yes     Comment: ocass    Drug use: Not Currently       Review of Systems:  GENERAL:  No weight loss, fever or chills.  HEENT:  No changes in vision, rhinorrhea, nasal  congestion.  PULMONARY:  No shortness of breath, coughing or wheezing.  CARDIOVASCULAR:  No chest pain, palpitations or syncope.  GI:  No abdominal pain, nausea, vomiting, diarrhea or constipation.  :  No dysuria, urgency or frequency.  MUSCULOSKELETAL:  No joint pain or arthralgia.  SKIN:  No rashes or skin breakdown.  ENDOCRINE:  No polyuria or polydipsia. No thyroid disease.  NEURO/PSYCH:  No headaches or trouble with cognition. Stable mood.        OBJECTIVE:     Vital Signs (Most Recent)  Temp: 97.8 °F (36.6 °C) (12/07/24 0725)  Pulse: 69 (12/07/24 0725)  Resp: 16 (12/07/24 0955)  BP: 136/76 (96) (12/07/24 0725)  SpO2: 98 % (12/07/24 0725)    Temperature Range Min/Max (Last 24H):  Temp:  [97.7 °F (36.5 °C)-98.3 °F (36.8 °C)]     Physical Exam:  GENERAL:  No apparent distress. Alert and oriented times three,  Obese, cushingoid.  HEENT:  PERRLA, EOMI. Conjunctivae intact. Posterior pharynx clear  NECK:  Supple with no lymphadenopathy or thyromegaly  LUNGS:  No respiratory distress. Clear to auscultation bilaterally with good air movement  CARDIAC:  RRR without murmur, rub or gallop  ABDOMEN:  Positive bowel sounds. Nontender and nondistended. No organomegaly  EXTREMITIES:  Peripheral pulses are 2+. Hands and feet are warm. Good capillary refill in fingers. No clubbing, cyanosis or edema  SKIN:  Skin color, texture and turgor normal. No rashes, ulcerations or nodules  NEUROLOGIC:  CN II-XII intact. Light touch sensation intact. Muscle strength 5/5 in all extremities     Laboratory:  I have reviewed all pertinent lab results within the past 24 hours.    Diagnostic Results:  CT: I have personally reviewed both the image and report    Imaging Reports:  Imaging Results              CT Abdomen Pelvis With IV Contrast NO Oral Contrast (Final result)  Result time 12/06/24 20:06:36      Final result by Mirta Gayle MD (12/06/24 20:06:36)                   Impression:      Mild infectious/inflammatory colitis  from the splenic flexure to the rectosigmoid junction. Findings slightly improved from prior.  Follow-up colonoscopy advised.      Electronically signed by: Mirta Gayle  Date:    12/06/2024  Time:    20:06               Narrative:    EXAMINATION:  CT ABDOMEN PELVIS WITH IV CONTRAST    CLINICAL HISTORY:  LLQ abdominal pain;    TECHNIQUE:  Low dose axial images, sagittal and coronal reformations were obtained from the lung bases to the pubic symphysis following the IV administration of 100 mL of Omnipaque 350 .  Oral contrast was not given.    COMPARISON:  11/17/2024    FINDINGS:  Soft tissues: Unremarkable.    Bones: No acute osseous abnormality.    Lower chest: Normal heart size. No pericardial effusion.    Lung Bases: Well aerated, without consolidation or pleural fluid.    Liver: Normal in size and attenuation, with no focal hepatic lesions.    Gallbladder: Surgically absent    Bile Ducts: No evidence of dilated ducts.    Pancreas: No mass or peripancreatic fat stranding.    Spleen: Unremarkable.    Adrenals: Unremarkable.    Kidneys/ Ureters: Nonobstructive left nephrolithiasis.    Bladder: No evidence of wall thickening.    Reproductive organs: Unremarkable.    GI Tract/Mesentery: Mild infectious/inflammatory colitis from the splenic flexure to the rectosigmoid junction.  Findings slightly improved from prior.    Peritoneal Space: No ascites. No free air.    Lymphadenopathy: No significant adenopathy.    Vasculature: No significant atherosclerosis or aneurysm.                                        ASSESSMENT/PLAN:     History of Present Illness:  Patient is a 49 y.o. male presents with recurrent typical UC flair sx's.  His has been a difficult case - he developed fungal pneumonia on Remicade, has been unresponsive to Entyvio and Omvie and has been more or less steroid dependent to control his disease.  He has type 2 DM, is obese and has gained 30 lb's on the prednisone .  He was recently weaned off the  prednisone and is on oral Budesonide  - has also received his first infusion of Tremfya. Currently started on solumedrol and abx.    Plan: important to check for C. Diff - will also obtain fecal calprotectin .  Agree with current care, hopefully pt will respond to his karissa tx which has just been started..

## 2024-12-07 NOTE — ASSESSMENT & PLAN NOTE
Body mass index is 50.42 kg/m². Morbid obesity complicates all aspects of disease management from diagnostic modalities to treatment.

## 2024-12-07 NOTE — ASSESSMENT & PLAN NOTE
H/o noted  Maintain xarelto  Last month pt had USS and report as follows  No thrombus in the central veins of the right upper extremity. Previously noted thrombus within the right basilic vein is no longer definitively visualized.   If GI bleed is an issue then NOAC should be on hold

## 2024-12-07 NOTE — SUBJECTIVE & OBJECTIVE
Interval History:  Patient seen and examined this morning resting comfortably in no acute distress.  He reported having several episodes bloody stools 2 days prior to his admission but has not had any so far today.  He is afebrile and hemodynamically stable continued on IV fluids and steroids.    Review of Systems   Constitutional:  Negative for activity change and appetite change.   HENT:  Negative for congestion and dental problem.    Eyes:  Negative for discharge and itching.   Respiratory:  Negative for shortness of breath.    Cardiovascular:  Negative for chest pain.   Gastrointestinal:  Positive for abdominal pain. Negative for abdominal distention and diarrhea.   Endocrine: Negative for cold intolerance.   Genitourinary:  Negative for difficulty urinating and dysuria.   Musculoskeletal:  Negative for arthralgias and back pain.   Skin:  Negative for color change.   Neurological:  Negative for dizziness and facial asymmetry.   Hematological:  Negative for adenopathy.   Psychiatric/Behavioral:  Negative for agitation and behavioral problems.      Objective:     Vital Signs (Most Recent):  Temp: 97.9 °F (36.6 °C) (12/07/24 1540)  Pulse: 70 (12/07/24 1540)  Resp: 16 (12/07/24 1540)  BP: (!) 158/88 (12/07/24 1540)  SpO2: 98 % (12/07/24 1540) Vital Signs (24h Range):  Temp:  [97.3 °F (36.3 °C)-98.2 °F (36.8 °C)] 97.9 °F (36.6 °C)  Pulse:  [69-80] 70  Resp:  [16-18] 16  SpO2:  [96 %-100 %] 98 %  BP: (136-158)/(68-96) 158/88     Weight: 134.1 kg (295 lb 12 oz)  Body mass index is 49.21 kg/m².    Intake/Output Summary (Last 24 hours) at 12/7/2024 1644  Last data filed at 12/7/2024 1321  Gross per 24 hour   Intake 840 ml   Output --   Net 840 ml         Physical Exam  Vitals and nursing note reviewed.   Constitutional:       Appearance: He is well-developed.   HENT:      Head: Atraumatic.      Right Ear: External ear normal.      Left Ear: External ear normal.      Nose: Nose normal.      Mouth/Throat:      Mouth:  Mucous membranes are moist.   Cardiovascular:      Rate and Rhythm: Normal rate.   Pulmonary:      Effort: Pulmonary effort is normal.   Abdominal:      Palpations: Abdomen is soft.   Musculoskeletal:         General: Normal range of motion.      Cervical back: Full passive range of motion without pain and normal range of motion.   Skin:     General: Skin is warm.   Neurological:      Mental Status: He is alert and oriented to person, place, and time.   Psychiatric:         Behavior: Behavior normal.             Significant Labs: All pertinent labs within the past 24 hours have been reviewed.  BMP:   Recent Labs   Lab 12/07/24  0724   *   *   K 5.6*      CO2 28   BUN 18   CREATININE 1.1   CALCIUM 9.2   MG 1.6     CBC:   Recent Labs   Lab 12/06/24  1651 12/07/24  0724   WBC 13.24* 9.29   HGB 10.0* 10.0*   HCT 33.1* 33.3*    248       Significant Imaging: I have reviewed all pertinent imaging results/findings within the past 24 hours.

## 2024-12-07 NOTE — PLAN OF CARE
Formerly [facility], later [facility]  Initial Discharge Assessment     Assessment completed at bedside with , and all information on FaceSheet confirmed, including demographics, PCP, pharmacy and insurance.  Pt has not addressed advance directive.  He currently lives alone, but has family and friends to visit him. His PCP is Hunter Aguilar MD, and preferred pharmacy is  [facility] on [address] in [place].  He is taking a Blood Thinners.  For transportation to appointments, he usually transport himself .  For transportation at discharge, will provide drive himself home. If he is unable to do that, his friend Juice Chan will pick him up.   Plan for discharge is to return back home.  Case Management to continue to follow for discharge planning needs.    Primary Care Provider: Hunter Aguilar III, MD    Admission Diagnosis: Ulcerative colitis [K51.90]    Admission Date: 12/6/2024  Expected Discharge Date:     Transition of Care Barriers: None    Payor: [employer] / Plan: [employer] ([employer]) / Product Type: Commercial /     Extended Emergency Contact Information  Primary Emergency Contact: Estiven (Sister-In-Law)Jessica  Address: [address] [place] [address] [facility]  Home Phone: [phone]  Mobile Phone: [phone]  Relation: Sister  Preferred language: English   needed? No  Secondary Emergency Contact: Dallas Jean-Baptiste   [place]  Mobile Phone: [phone]  Relation: Brother  Preferred language: English   needed? No    Discharge Plan A: Home  Discharge Plan B: Home      [facility] - [place], [place] - [address] [facility]  [address] [facility] [address]  Phone: [phone] Fax: [phone]    [facility]  [address]  [facility] [address]  Phone: [phone] Fax: [phone]    [id number] - [place] - [address] [facility]  [address]  [address]  [place] [address]  Phone: [phone] Fax: [phone]      Initial Assessment (most recent)       Adult Discharge Assessment - 12/07/24 1246          Discharge Assessment    Assessment Type Discharge Planning Assessment     Confirmed/corrected address, phone number and insurance Yes     Confirmed Demographics Correct on Facesheet     Source of Information patient     When was your last doctors appointment? 12/02/24     Does patient/caregiver understand observation status Yes     Reason For Admission Exacerbation of ulcerative colitis     People in Home alone     Facility Arrived From: home     Do you expect to return to your current living situation? Yes     Do you have help at home or someone to help you manage your care at home? Yes     Who are your caregiver(s) and their phone number(s)? Estiven (Sister-In-Law),Jessica (Sister)  [phone] (Mobile)     Prior to hospitilization cognitive status: Alert/Oriented     Current cognitive status: Alert/Oriented     Walking or Climbing Stairs Difficulty no     Dressing/Bathing Difficulty no     Home Accessibility wheelchair accessible     Equipment Currently Used at Home none     Readmission within 30 days? No     Patient currently being followed by outpatient case management? No     Do you currently have service(s) that help you manage your care at home? Yes     Name and Contact number of agency [facility] [place] [phone]     Is the pt/caregiver preference to resume services with current agency Yes     Do you take prescription medications? Yes     Do you have prescription coverage? Yes     Coverage [employer] ([employer]     Do you have any problems affording any of your prescribed medications? No     Is the patient taking medications as prescribed? yes     Who is going to help you get home at discharge? Juice Chan [phone] ( friend)     How do you get to doctors appointments?  car, drives self     Are you on dialysis? No     Do you take coumadin? No     Discharge Plan A Home     Discharge Plan B Home     DME Needed Upon Discharge  none     Discharge Plan discussed with: Patient     Transition of Care Barriers None

## 2024-12-07 NOTE — NURSING
Patient arrived to unit via wheelchair. Oriented to room. Call light within reach. Bed in lowest position and lock.

## 2024-12-07 NOTE — ASSESSMENT & PLAN NOTE
Continue steroids, discontinued antibiotics, Gastroenterology has been consulted and have ordered fecal calprotectin    Overall patient without any additional episodes of bloody stools today

## 2024-12-07 NOTE — ASSESSMENT & PLAN NOTE
Stable    Latest Reference Range & Units 04/21/23 05:25 10/27/23 06:41 03/16/24 14:04 06/06/24 04:51 09/11/24 10:48 11/02/24 04:56   Hemoglobin A1C External 4.5 - 6.2 % 4.8 5.2 5.7 5.2 4.9 5.8

## 2024-12-07 NOTE — ASSESSMENT & PLAN NOTE
H/o noted  Maintain xarelto  Last month pt had USS and report as follows  No thrombus in the central veins of the right upper extremity. Previously noted thrombus within the right basilic vein is no longer definitively visualized.

## 2024-12-07 NOTE — HPI
49 year old pt getting admitted with ulcerative colitis flare  Abdominal pain and diarrhea due to UC is a chronic normal issue  for pt   Today pt started having bloody stools and came to ER and got admitted  He was recently started on an inj for UC and just had first dose ,few days ago  Denied any other issues   Pt was on PO Budesonide with tapered doses few weeks ago

## 2024-12-07 NOTE — SUBJECTIVE & OBJECTIVE
Past Medical History:   Diagnosis Date    C. difficile colitis     Cavitary pneumonia 11/2023    pos aspergillus serology    Diabetes mellitus 01/2022    Hyperlipidemia 2015    Hypertension 2015    Insomnia 2015    Ulcerative colitis        Past Surgical History:   Procedure Laterality Date    BRONCHOSCOPY WITH FLUOROSCOPY Left 11/20/2023    Procedure: BRONCHOSCOPY, WITH FLUOROSCOPY;  Surgeon: Lisa Villarreal MD;  Location: [facility];  Service: Pulmonary;  Laterality: Left;    BRONCHOSCOPY WITH FLUOROSCOPY N/A 11/30/2023    Procedure: BRONCHOSCOPY, WITH FLUOROSCOPY;  Surgeon: Lisa Villarreal MD;  Location: [facility];  Service: Pulmonary;  Laterality: N/A;    CARDIAC ELECTROPHYSIOLOGY MAPPING AND ABLATION  2017    SVT    CHOLECYSTECTOMY  2011    COLONOSCOPY N/A 5/3/2022    Procedure: COLONOSCOPY;  Surgeon: Shan Jean III, MD;  Location: [facility];  Service: Endoscopy;  Laterality: N/A;    COLONOSCOPY N/A 3/16/2024    Procedure: COLONOSCOPY;  Surgeon: ALEKSANDER Ramos MD;  Location: [facility];  Service: Endoscopy;  Laterality: N/A;    COLONOSCOPY WITH FECAL MICROBIOTA TRANSFER N/A 3/21/2023    Procedure: COLONOSCOPY, WITH FECAL MICROBIOTA TRANSFER;  Surgeon: David Millan MD;  Location: [facility];  Service: Endoscopy;  Laterality: N/A;    ESOPHAGOGASTRODUODENOSCOPY N/A 4/24/2023    Procedure: EGD (ESOPHAGOGASTRODUODENOSCOPY);  Surgeon: Shan Jean III, MD;  Location: [facility];  Service: Endoscopy;  Laterality: N/A;    ESOPHAGOGASTRODUODENOSCOPY N/A 6/5/2024    Procedure: EGD (ESOPHAGOGASTRODUODENOSCOPY);  Surgeon: Odalis Mccabe MD;  Location: [facility];  Service: Endoscopy;  Laterality: N/A;    FLEXIBLE SIGMOIDOSCOPY N/A 6/5/2024    Procedure: SIGMOIDOSCOPY, FLEXIBLE;  Surgeon: Odalis Mccabe MD;  Location: [facility];  Service: Endoscopy;  Laterality: N/A;    FLEXIBLE SIGMOIDOSCOPY N/A 7/16/2024    Procedure: SIGMOIDOSCOPY, FLEXIBLE;  Surgeon: Shan Jean III, MD;   Location: [facility];  Service: Endoscopy;  Laterality: N/A;    FLEXIBLE SIGMOIDOSCOPY N/A 8/13/2024    Procedure: SIGMOIDOSCOPY, FLEXIBLE;  Surgeon: Shan Jean III, MD;  Location: [facility];  Service: Endoscopy;  Laterality: N/A;    GANGLION CYST EXCISION Left 1992    UMBILICAL HERNIA REPAIR  2011    with mesh       Review of patient's allergies indicates:  No Known Allergies    Current Facility-Administered Medications on File Prior to Encounter   Medication    lactated ringers infusion     Current Outpatient Medications on File Prior to Encounter   Medication Sig    budesonide (ENTOCORT EC) 3 mg capsule Take 9 mg by mouth once daily.    HYDROcodone-acetaminophen (NORCO)  mg per tablet Take 1 tablet by mouth 4 (four) times daily.    busPIRone (BUSPAR) 15 MG tablet Take 1 tablet (15 mg total) by mouth 3 (three) times daily.    ciprofloxacin HCl (CIPRO) 500 MG tablet Take 1 tablet (500 mg total) by mouth every 12 (twelve) hours.    colchicine (COLCRYS) 0.6 mg tablet Take 1 tablet (0.6 mg total) by mouth 2 (two) times daily.    ergocalciferol (VITAMIN D2) 50,000 unit Cap Take 1 capsule (50,000 Units total) by mouth every 7 days.    EScitalopram oxalate (LEXAPRO) 10 MG tablet Take 1 tablet (10 mg total) by mouth once daily.    hyoscyamine (LEVBID) 0.375 mg Tb12 Take 0.375 mg by mouth 2 (two) times daily.    LORazepam (ATIVAN) 0.5 MG tablet Take 1 tablet (0.5 mg total) by mouth every 6 (six) hours as needed for Anxiety.    metoprolol succinate (TOPROL-XL) 50 MG 24 hr tablet Take 1 tablet (50 mg total) by mouth once daily.    metroNIDAZOLE (FLAGYL) 500 MG tablet Take 1 tablet (500 mg total) by mouth every 8 (eight) hours.    oxyCODONE-acetaminophen (PERCOCET) 7.5-325 mg per tablet Take 1 tablet by mouth every 8 (eight) hours as needed for Pain.    pantoprazole (PROTONIX) 40 MG tablet Take 40 mg by mouth 2 (two) times daily.    predniSONE (DELTASONE) 10 MG tablet Take 3 tablets (30 mg total) by mouth once  daily for 5 days, THEN 2 tablets (20 mg total) once daily for 5 days, THEN 1 tablet (10 mg total) once daily for 5 days, THEN 0.5 tablets (5 mg total) once daily for 5 days.    promethazine (PHENERGAN) 25 MG tablet Take 25 mg by mouth 4 (four) times daily as needed for Nausea.    rivaroxaban (XARELTO) 20 mg Tab Take 1 tablet (20 mg total) by mouth daily with dinner or evening meal.    semaglutide (OZEMPIC) 0.25 mg or 0.5 mg (2 mg/3 mL) pen injector Inject 0.25 mg into the skin every 7 days.    simvastatin (ZOCOR) 20 MG tablet Take 1 tablet (20 mg total) by mouth every evening.    zolpidem (AMBIEN) 10 mg Tab Take 1 tablet (10 mg total) by mouth nightly as needed (insomnia).     Family History       Problem Relation (Age of Onset)    Asthma Mother          Tobacco Use    Smoking status: Former     Average packs/day: 1 pack/day for 30.0 years (30.0 ttl pk-yrs)     Types: Cigarettes     Start date: 1993    Smokeless tobacco: Never    Tobacco comments:     Pt states he has stopped smoking with Chantix three weeks ago. 12/1/23   Substance and Sexual Activity    Alcohol use: Yes     Comment: ocass    Drug use: Not Currently    Sexual activity: Not Currently     Review of Systems   Constitutional:  Negative for activity change and appetite change.   HENT:  Negative for congestion and dental problem.    Eyes:  Negative for discharge and itching.   Respiratory:  Negative for shortness of breath.    Cardiovascular:  Negative for chest pain.   Gastrointestinal:  Positive for abdominal pain and diarrhea. Negative for abdominal distention.   Endocrine: Negative for cold intolerance.   Genitourinary:  Negative for difficulty urinating and dysuria.   Musculoskeletal:  Negative for arthralgias and back pain.   Skin:  Negative for color change.   Neurological:  Negative for dizziness and facial asymmetry.   Hematological:  Negative for adenopathy.   Psychiatric/Behavioral:  Negative for agitation and behavioral problems.       Objective:     Vital Signs (Most Recent):  Temp: 98.3 °F (36.8 °C) (12/06/24 1533)  Pulse: 85 (12/06/24 1533)  Resp: 18 (12/06/24 2010)  BP: 139/64 (12/06/24 1533)  SpO2: 100 % (12/06/24 1533) Vital Signs (24h Range):  Temp:  [98.3 °F (36.8 °C)] 98.3 °F (36.8 °C)  Pulse:  [85] 85  Resp:  [18] 18  SpO2:  [100 %] 100 %  BP: (139)/(64) 139/64     Weight: (!) 137.4 kg (303 lb)  Body mass index is 50.42 kg/m².     Physical Exam  Vitals and nursing note reviewed.   Constitutional:       Appearance: He is well-developed.   HENT:      Head: Atraumatic.      Right Ear: External ear normal.      Left Ear: External ear normal.      Nose: Nose normal.      Mouth/Throat:      Mouth: Mucous membranes are moist.   Cardiovascular:      Rate and Rhythm: Normal rate.   Pulmonary:      Effort: Pulmonary effort is normal.   Abdominal:      Palpations: Abdomen is soft.   Musculoskeletal:         General: Normal range of motion.      Cervical back: Full passive range of motion without pain and normal range of motion.   Skin:     General: Skin is warm.   Neurological:      Mental Status: He is alert and oriented to person, place, and time.   Psychiatric:         Behavior: Behavior normal.                Significant Labs: All pertinent labs within the past 24 hours have been reviewed.  CBC:   Recent Labs   Lab 12/06/24  1651   WBC 13.24*   HGB 10.0*   HCT 33.1*        CMP:   Recent Labs   Lab 12/06/24  1651      K 5.2*      CO2 29   *   BUN 19   CREATININE 1.2   CALCIUM 9.2   PROT 7.2   ALBUMIN 4.0   BILITOT 0.3   ALKPHOS 62   AST 20   ALT 15   ANIONGAP 5*       Significant Imaging: I have reviewed all pertinent imaging results/findings within the past 24 hours.

## 2024-12-08 LAB
ALBUMIN SERPL BCP-MCNC: 4 G/DL (ref 3.5–5.2)
ALP SERPL-CCNC: 65 U/L (ref 55–135)
ALT SERPL W/O P-5'-P-CCNC: 12 U/L (ref 10–44)
ANION GAP SERPL CALC-SCNC: 7 MMOL/L (ref 8–16)
AST SERPL-CCNC: 11 U/L (ref 10–40)
BASOPHILS # BLD AUTO: 0.01 K/UL (ref 0–0.2)
BASOPHILS NFR BLD: 0.1 % (ref 0–1.9)
BILIRUB SERPL-MCNC: 0.4 MG/DL (ref 0.1–1)
BUN SERPL-MCNC: 19 MG/DL (ref 6–20)
CALCIUM SERPL-MCNC: 9.6 MG/DL (ref 8.7–10.5)
CHLORIDE SERPL-SCNC: 100 MMOL/L (ref 95–110)
CO2 SERPL-SCNC: 26 MMOL/L (ref 23–29)
CREAT SERPL-MCNC: 1.1 MG/DL (ref 0.5–1.4)
DIFFERENTIAL METHOD BLD: ABNORMAL
EOSINOPHIL # BLD AUTO: 0 K/UL (ref 0–0.5)
EOSINOPHIL NFR BLD: 0 % (ref 0–8)
ERYTHROCYTE [DISTWIDTH] IN BLOOD BY AUTOMATED COUNT: 16.6 % (ref 11.5–14.5)
EST. GFR  (NO RACE VARIABLE): >60 ML/MIN/1.73 M^2
GLUCOSE SERPL-MCNC: 148 MG/DL (ref 70–110)
HCT VFR BLD AUTO: 32.9 % (ref 40–54)
HGB BLD-MCNC: 10.2 G/DL (ref 14–18)
IMM GRANULOCYTES # BLD AUTO: 0.04 K/UL (ref 0–0.04)
IMM GRANULOCYTES NFR BLD AUTO: 0.4 % (ref 0–0.5)
LYMPHOCYTES # BLD AUTO: 0.7 K/UL (ref 1–4.8)
LYMPHOCYTES NFR BLD: 6.6 % (ref 18–48)
MAGNESIUM SERPL-MCNC: 1.6 MG/DL (ref 1.6–2.6)
MCH RBC QN AUTO: 27.5 PG (ref 27–31)
MCHC RBC AUTO-ENTMCNC: 31 G/DL (ref 32–36)
MCV RBC AUTO: 89 FL (ref 82–98)
MONOCYTES # BLD AUTO: 0.3 K/UL (ref 0.3–1)
MONOCYTES NFR BLD: 2.8 % (ref 4–15)
NEUTROPHILS # BLD AUTO: 9.2 K/UL (ref 1.8–7.7)
NEUTROPHILS NFR BLD: 90.1 % (ref 38–73)
NRBC BLD-RTO: 0 /100 WBC
PLATELET # BLD AUTO: 246 K/UL (ref 150–450)
PMV BLD AUTO: 11 FL (ref 9.2–12.9)
POTASSIUM SERPL-SCNC: 5.2 MMOL/L (ref 3.5–5.1)
PROT SERPL-MCNC: 7.2 G/DL (ref 6–8.4)
RBC # BLD AUTO: 3.71 M/UL (ref 4.6–6.2)
SODIUM SERPL-SCNC: 133 MMOL/L (ref 136–145)
WBC # BLD AUTO: 10.18 K/UL (ref 3.9–12.7)

## 2024-12-08 PROCEDURE — 25000003 PHARM REV CODE 250

## 2024-12-08 PROCEDURE — 25000003 PHARM REV CODE 250: Performed by: INTERNAL MEDICINE

## 2024-12-08 PROCEDURE — 96376 TX/PRO/DX INJ SAME DRUG ADON: CPT

## 2024-12-08 PROCEDURE — 36415 COLL VENOUS BLD VENIPUNCTURE: CPT | Performed by: INTERNAL MEDICINE

## 2024-12-08 PROCEDURE — 12000002 HC ACUTE/MED SURGE SEMI-PRIVATE ROOM

## 2024-12-08 PROCEDURE — 27000207 HC ISOLATION

## 2024-12-08 PROCEDURE — 96374 THER/PROPH/DIAG INJ IV PUSH: CPT | Mod: 59

## 2024-12-08 PROCEDURE — 80053 COMPREHEN METABOLIC PANEL: CPT | Performed by: INTERNAL MEDICINE

## 2024-12-08 PROCEDURE — 85025 COMPLETE CBC W/AUTO DIFF WBC: CPT | Performed by: INTERNAL MEDICINE

## 2024-12-08 PROCEDURE — 83735 ASSAY OF MAGNESIUM: CPT | Performed by: INTERNAL MEDICINE

## 2024-12-08 PROCEDURE — 63600175 PHARM REV CODE 636 W HCPCS: Performed by: INTERNAL MEDICINE

## 2024-12-08 RX ORDER — LORAZEPAM 0.5 MG/1
0.5 TABLET ORAL EVERY 6 HOURS PRN
Status: DISCONTINUED | OUTPATIENT
Start: 2024-12-08 | End: 2024-12-09 | Stop reason: HOSPADM

## 2024-12-08 RX ORDER — DIPHENHYDRAMINE HCL 25 MG
25 CAPSULE ORAL EVERY 6 HOURS PRN
Status: DISCONTINUED | OUTPATIENT
Start: 2024-12-08 | End: 2024-12-09 | Stop reason: HOSPADM

## 2024-12-08 RX ADMIN — SODIUM ZIRCONIUM CYCLOSILICATE 5 G: 5 POWDER, FOR SUSPENSION ORAL at 09:12

## 2024-12-08 RX ADMIN — SODIUM CHLORIDE: 9 INJECTION, SOLUTION INTRAVENOUS at 02:12

## 2024-12-08 RX ADMIN — HYDROCODONE BITARTRATE AND ACETAMINOPHEN 1 TABLET: 5; 325 TABLET ORAL at 09:12

## 2024-12-08 RX ADMIN — ONDANSETRON 4 MG: 2 INJECTION INTRAMUSCULAR; INTRAVENOUS at 05:12

## 2024-12-08 RX ADMIN — LORAZEPAM 0.5 MG: 0.5 TABLET ORAL at 10:12

## 2024-12-08 RX ADMIN — Medication 6 MG: at 11:12

## 2024-12-08 RX ADMIN — ONDANSETRON 4 MG: 2 INJECTION INTRAMUSCULAR; INTRAVENOUS at 09:12

## 2024-12-08 RX ADMIN — HYDROMORPHONE HYDROCHLORIDE 1 MG: 1 INJECTION, SOLUTION INTRAMUSCULAR; INTRAVENOUS; SUBCUTANEOUS at 07:12

## 2024-12-08 RX ADMIN — HYDROMORPHONE HYDROCHLORIDE 1 MG: 1 INJECTION, SOLUTION INTRAMUSCULAR; INTRAVENOUS; SUBCUTANEOUS at 10:12

## 2024-12-08 RX ADMIN — HYDROMORPHONE HYDROCHLORIDE 1 MG: 1 INJECTION, SOLUTION INTRAMUSCULAR; INTRAVENOUS; SUBCUTANEOUS at 11:12

## 2024-12-08 RX ADMIN — ZOLPIDEM TARTRATE 10 MG: 5 TABLET, COATED ORAL at 09:12

## 2024-12-08 RX ADMIN — HYDROCODONE BITARTRATE AND ACETAMINOPHEN 1 TABLET: 5; 325 TABLET ORAL at 05:12

## 2024-12-08 RX ADMIN — METHYLPREDNISOLONE SODIUM SUCCINATE 40 MG: 40 INJECTION, POWDER, FOR SOLUTION INTRAMUSCULAR; INTRAVENOUS at 10:12

## 2024-12-08 RX ADMIN — ESCITALOPRAM OXALATE 10 MG: 10 TABLET ORAL at 09:12

## 2024-12-08 RX ADMIN — RIVAROXABAN 20 MG: 10 TABLET, FILM COATED ORAL at 05:12

## 2024-12-08 RX ADMIN — SODIUM ZIRCONIUM CYCLOSILICATE 5 G: 5 POWDER, FOR SUSPENSION ORAL at 03:12

## 2024-12-08 RX ADMIN — BUSPIRONE HYDROCHLORIDE 15 MG: 5 TABLET ORAL at 09:12

## 2024-12-08 RX ADMIN — DIPHENHYDRAMINE HYDROCHLORIDE 25 MG: 25 CAPSULE ORAL at 10:12

## 2024-12-08 RX ADMIN — FAMOTIDINE 20 MG: 20 TABLET ORAL at 09:12

## 2024-12-08 RX ADMIN — METHYLPREDNISOLONE SODIUM SUCCINATE 40 MG: 40 INJECTION, POWDER, FOR SOLUTION INTRAMUSCULAR; INTRAVENOUS at 09:12

## 2024-12-08 RX ADMIN — HYDROMORPHONE HYDROCHLORIDE 1 MG: 1 INJECTION, SOLUTION INTRAMUSCULAR; INTRAVENOUS; SUBCUTANEOUS at 05:12

## 2024-12-08 RX ADMIN — BUSPIRONE HYDROCHLORIDE 15 MG: 5 TABLET ORAL at 02:12

## 2024-12-08 RX ADMIN — HYDROMORPHONE HYDROCHLORIDE 1 MG: 1 INJECTION, SOLUTION INTRAMUSCULAR; INTRAVENOUS; SUBCUTANEOUS at 02:12

## 2024-12-08 NOTE — PROGRESS NOTES
[facility] Medicine  Progress Note    Patient Name: Jacoby Jean-Baptiste  MRN: [id number]  Patient Class: OP- Observation   Admission Date: 12/6/2024  Length of Stay: 0 days  Attending Physician: Sofie Prasad MD  Primary Care Provider: Hunter Aguilar III, MD        Subjective     Principal Problem:Exacerbation of ulcerative colitis        HPI:  49 year old pt getting admitted with ulcerative colitis flare  Abdominal pain and diarrhea due to UC is a chronic normal issue  for pt   Today pt started having bloody stools and came to ER and got admitted  He was recently started on an inj for UC and just had first dose ,few days ago  Denied any other issues   Pt was on PO Budesonide with tapered doses few weeks ago    Overview/Hospital Course:  No notes on file    Interval History:  Patient seen and examined this morning.  He is resting comfortably in no acute distress.  He has not had an additional bowel movement but is passing gas he is afebrile and hemodynamically stable.  Continue steroids    Review of Systems   Constitutional:  Negative for activity change and appetite change.   HENT:  Negative for congestion and dental problem.    Eyes:  Negative for discharge and itching.   Respiratory:  Negative for shortness of breath.    Cardiovascular:  Negative for chest pain.   Gastrointestinal:  Positive for abdominal pain. Negative for abdominal distention and diarrhea.   Endocrine: Negative for cold intolerance.   Genitourinary:  Negative for difficulty urinating and dysuria.   Musculoskeletal:  Negative for arthralgias and back pain.   Skin:  Negative for color change.   Neurological:  Negative for dizziness and facial asymmetry.   Hematological:  Negative for adenopathy.   Psychiatric/Behavioral:  Negative for agitation and behavioral problems.      Objective:     Vital Signs (Most Recent):  Temp: 97.7 °F (36.5 °C) (12/08/24 1106)  Pulse: 74 (12/08/24 1106)  Resp: 18 (12/08/24 1106)  BP:  136/85 (12/08/24 1106)  SpO2: 98 % (12/08/24 1106) Vital Signs (24h Range):  Temp:  [97.7 °F (36.5 °C)-98.3 °F (36.8 °C)] 97.7 °F (36.5 °C)  Pulse:  [65-80] 74  Resp:  [14-18] 18  SpO2:  [97 %-100 %] 98 %  BP: (120-158)/(80-88) 136/85     Weight: 134.1 kg (295 lb 12 oz)  Body mass index is 49.21 kg/m².    Intake/Output Summary (Last 24 hours) at 12/8/2024 1429  Last data filed at 12/8/2024 1356  Gross per 24 hour   Intake 820 ml   Output --   Net 820 ml         Physical Exam  Vitals and nursing note reviewed.   Constitutional:       Appearance: He is well-developed.   HENT:      Head: Atraumatic.      Right Ear: External ear normal.      Left Ear: External ear normal.      Nose: Nose normal.      Mouth/Throat:      Mouth: Mucous membranes are moist.   Cardiovascular:      Rate and Rhythm: Normal rate.   Pulmonary:      Effort: Pulmonary effort is normal.   Abdominal:      Palpations: Abdomen is soft.      Tenderness: There is abdominal tenderness. There is no guarding.   Musculoskeletal:         General: Normal range of motion.      Cervical back: Full passive range of motion without pain and normal range of motion.   Skin:     General: Skin is warm.   Neurological:      Mental Status: He is alert and oriented to person, place, and time.   Psychiatric:         Behavior: Behavior normal.             Significant Labs: All pertinent labs within the past 24 hours have been reviewed.  BMP:   Recent Labs   Lab 12/08/24  0657   *   *   K 5.2*      CO2 26   BUN 19   CREATININE 1.1   CALCIUM 9.6   MG 1.6     CBC:   Recent Labs   Lab 12/06/24  1651 12/07/24  0724 12/08/24  0657   WBC 13.24* 9.29 10.18   HGB 10.0* 10.0* 10.2*   HCT 33.1* 33.3* 32.9*    248 246       Significant Imaging: I have reviewed all pertinent imaging results/findings within the past 24 hours.    Assessment and Plan     * Exacerbation of ulcerative colitis  Continue steroids, discontinued antibiotics, Gastroenterology has been  consulted and have ordered fecal calprotectin    Overall patient without any additional episodes of bloody stools today      DVT (deep venous thrombosis)  H/o noted  Maintain xarelto  Last month pt had USS and report as follows  No thrombus in the central veins of the right upper extremity. Previously noted thrombus within the right basilic vein is no longer definitively visualized.         Long-term use of immunosuppressant medication  Aware       PUD (peptic ulcer disease)  Aware  Maintain pepcid      History of Clostridioides difficile colitis  C diff antigen with reflex to toxin ordered, order will be canceled if patient is without bowel movements 48 after the order was placed      Type 2 diabetes mellitus without complication, without long-term current use of insulin  Stable    Latest Reference Range & Units 04/21/23 05:25 10/27/23 06:41 03/16/24 14:04 06/06/24 04:51 09/11/24 10:48 11/02/24 04:56   Hemoglobin A1C External 4.5 - 6.2 % 4.8 5.2 5.7 5.2 4.9 5.8         Morbid obesity  Body mass index is 50.42 kg/m². Morbid obesity complicates all aspects of disease management from diagnostic modalities to treatment.       VTE Risk Mitigation (From admission, onward)           Ordered     rivaroxaban tablet 20 mg  With dinner         12/06/24 2117     IP VTE HIGH RISK PATIENT  Once         12/06/24 2117     Place sequential compression device  Until discontinued         12/06/24 2117                    Discharge Planning   GERARDO: 12/9/2024     Code Status: Full Code   Medical Readiness for Discharge Date:   Discharge Plan A: Home                        Sofie Prasad MD  Department of Hospital Medicine   [facility]     I will SWITCH the dose or number of times a day I take the medications listed below when I get home from the hospital:    amLODIPine 5 mg oral tablet  -- 1 tab(s) by mouth once a day

## 2024-12-08 NOTE — SUBJECTIVE & OBJECTIVE
Interval History:  Patient seen and examined this morning.  He is resting comfortably in no acute distress.  He has not had an additional bowel movement but is passing gas he is afebrile and hemodynamically stable.  Continue steroids    Review of Systems   Constitutional:  Negative for activity change and appetite change.   HENT:  Negative for congestion and dental problem.    Eyes:  Negative for discharge and itching.   Respiratory:  Negative for shortness of breath.    Cardiovascular:  Negative for chest pain.   Gastrointestinal:  Positive for abdominal pain. Negative for abdominal distention and diarrhea.   Endocrine: Negative for cold intolerance.   Genitourinary:  Negative for difficulty urinating and dysuria.   Musculoskeletal:  Negative for arthralgias and back pain.   Skin:  Negative for color change.   Neurological:  Negative for dizziness and facial asymmetry.   Hematological:  Negative for adenopathy.   Psychiatric/Behavioral:  Negative for agitation and behavioral problems.      Objective:     Vital Signs (Most Recent):  Temp: 97.7 °F (36.5 °C) (12/08/24 1106)  Pulse: 74 (12/08/24 1106)  Resp: 18 (12/08/24 1106)  BP: 136/85 (12/08/24 1106)  SpO2: 98 % (12/08/24 1106) Vital Signs (24h Range):  Temp:  [97.7 °F (36.5 °C)-98.3 °F (36.8 °C)] 97.7 °F (36.5 °C)  Pulse:  [65-80] 74  Resp:  [14-18] 18  SpO2:  [97 %-100 %] 98 %  BP: (120-158)/(80-88) 136/85     Weight: 134.1 kg (295 lb 12 oz)  Body mass index is 49.21 kg/m².    Intake/Output Summary (Last 24 hours) at 12/8/2024 1429  Last data filed at 12/8/2024 1356  Gross per 24 hour   Intake 820 ml   Output --   Net 820 ml         Physical Exam  Vitals and nursing note reviewed.   Constitutional:       Appearance: He is well-developed.   HENT:      Head: Atraumatic.      Right Ear: External ear normal.      Left Ear: External ear normal.      Nose: Nose normal.      Mouth/Throat:      Mouth: Mucous membranes are moist.   Cardiovascular:      Rate and Rhythm:  Normal rate.   Pulmonary:      Effort: Pulmonary effort is normal.   Abdominal:      Palpations: Abdomen is soft.      Tenderness: There is abdominal tenderness. There is no guarding.   Musculoskeletal:         General: Normal range of motion.      Cervical back: Full passive range of motion without pain and normal range of motion.   Skin:     General: Skin is warm.   Neurological:      Mental Status: He is alert and oriented to person, place, and time.   Psychiatric:         Behavior: Behavior normal.             Significant Labs: All pertinent labs within the past 24 hours have been reviewed.  BMP:   Recent Labs   Lab 12/08/24  0657   *   *   K 5.2*      CO2 26   BUN 19   CREATININE 1.1   CALCIUM 9.6   MG 1.6     CBC:   Recent Labs   Lab 12/06/24  1651 12/07/24  0724 12/08/24  0657   WBC 13.24* 9.29 10.18   HGB 10.0* 10.0* 10.2*   HCT 33.1* 33.3* 32.9*    248 246       Significant Imaging: I have reviewed all pertinent imaging results/findings within the past 24 hours.

## 2024-12-08 NOTE — PLAN OF CARE
Problem: Adult Inpatient Plan of Care  Goal: Plan of Care Review  Outcome: Progressing  Goal: Patient-Specific Goal (Individualized)  Outcome: Progressing  Goal: Absence of Hospital-Acquired Illness or Injury  Outcome: Progressing  Goal: Optimal Comfort and Wellbeing  Outcome: Progressing  Goal: Readiness for Transition of Care  Outcome: Progressing     Problem: Bariatric Environmental Safety  Goal: Safety Maintained with Care  Outcome: Progressing     Problem: Diabetes Comorbidity  Goal: Blood Glucose Level Within Targeted Range  Outcome: Progressing     Problem: Sepsis/Septic Shock  Goal: Optimal Coping  Outcome: Progressing  Goal: Absence of Bleeding  Outcome: Progressing  Goal: Blood Glucose Level Within Targeted Range  Outcome: Progressing  Goal: Absence of Infection Signs and Symptoms  Outcome: Progressing  Goal: Optimal Nutrition Intake  Outcome: Progressing     Problem: Acute Kidney Injury/Impairment  Goal: Fluid and Electrolyte Balance  Outcome: Progressing  Goal: Improved Oral Intake  Outcome: Progressing  Goal: Effective Renal Function  Outcome: Progressing     Problem: Wound  Goal: Optimal Coping  Outcome: Progressing  Goal: Optimal Functional Ability  Outcome: Progressing  Goal: Absence of Infection Signs and Symptoms  Outcome: Progressing  Goal: Improved Oral Intake  Outcome: Progressing  Goal: Optimal Pain Control and Function  Outcome: Progressing  Goal: Skin Health and Integrity  Outcome: Progressing  Goal: Optimal Wound Healing  Outcome: Progressing     Problem: Fall Injury Risk  Goal: Absence of Fall and Fall-Related Injury  Outcome: Progressing     Problem: Infection  Goal: Absence of Infection Signs and Symptoms  Outcome: Progressing     Problem: Bowel Disease, Inflammatory (Ulcerative Colitis or Crohn's Disease)  Goal: Optimal Adaptation to Chronic Illness  Outcome: Progressing  Goal: Diarrhea Symptom Relief  Outcome: Progressing  Goal: Absence of Infection Signs and Symptoms  Outcome:  Progressing  Goal: Optimal Nutrition Delivery  Outcome: Progressing  Goal: Optimal Pain Control and Function  Outcome: Progressing     Problem: Pain Acute  Goal: Optimal Pain Control and Function  Outcome: Progressing

## 2024-12-08 NOTE — ASSESSMENT & PLAN NOTE
C diff antigen with reflex to toxin ordered, order will be canceled if patient is without bowel movements 48 after the order was placed

## 2024-12-08 NOTE — PROGRESS NOTES
"Progress Note  Gastroenterology/Hepatology    SUBJECTIVE:     Follow-up For:  UC flair    HPI/Interval history: pt has had no BM's today, no stool studies have been obtained.  He feels constipated.  Afebrile, abdomen is soft, non distended and non tender.  Encouraged ambulation.  Will give mg citrate + oral dulcolax this PM - should be ready for D/C soon with close out pt f/u with Dr Avitia.    PMH/FH/SH/Review of Systems: See H and P/Consult dated 12/6/2024    OBJECTIVE:     Vital Signs Range (Last 24H):  Temp:  [97.7 °F (36.5 °C)-98.3 °F (36.8 °C)]   Pulse:  [65-80]   Resp:  [14-18]   BP: (120-156)/(78-86)   SpO2:  [94 %-100 %]     I & O (Last 24H):  Intake/Output Summary (Last 24 hours) at 12/8/2024 1639  Last data filed at 12/8/2024 1356  Gross per 24 hour   Intake 820 ml   Output --   Net 820 ml       Physical Exam:  General: well developed, well nourished, appears stated age, no distress, morbidly obese  Abdomen/Rectal: Abdomen: soft, non-tender non-distented; bowel sounds normal; no masses,  no organomegaly. Rectal: not examined    Lab/X-ray Results:  I have reviewed all pertinent lab results within the past 24 hours.  CBC:   Recent Labs   Lab 12/08/24  0657   WBC 10.18   RBC 3.71*   HGB 10.2*   HCT 32.9*      MCV 89   MCH 27.5   MCHC 31.0*     CMP:   Recent Labs   Lab 12/08/24  0657   *   CALCIUM 9.6   ALBUMIN 4.0   PROT 7.2   *   K 5.2*   CO2 26      BUN 19   CREATININE 1.1   ALKPHOS 65   ALT 12   AST 11   BILITOT 0.4     Coagulation: No results for input(s): "LABPROT", "INR", "APTT" in the last 168 hours.    Imaging Reports:  Imaging Results              CT Abdomen Pelvis With IV Contrast NO Oral Contrast (Final result)  Result time 12/06/24 20:06:36      Final result by Mirta Gayle MD (12/06/24 20:06:36)                   Impression:      Mild infectious/inflammatory colitis from the splenic flexure to the rectosigmoid junction. Findings slightly improved from prior. "  Follow-up colonoscopy advised.      Electronically signed by: RajBinhDax Gayle  Date:    12/06/2024  Time:    20:06               Narrative:    EXAMINATION:  CT ABDOMEN PELVIS WITH IV CONTRAST    CLINICAL HISTORY:  LLQ abdominal pain;    TECHNIQUE:  Low dose axial images, sagittal and coronal reformations were obtained from the lung bases to the pubic symphysis following the IV administration of 100 mL of Omnipaque 350 .  Oral contrast was not given.    COMPARISON:  11/17/2024    FINDINGS:  Soft tissues: Unremarkable.    Bones: No acute osseous abnormality.    Lower chest: Normal heart size. No pericardial effusion.    Lung Bases: Well aerated, without consolidation or pleural fluid.    Liver: Normal in size and attenuation, with no focal hepatic lesions.    Gallbladder: Surgically absent    Bile Ducts: No evidence of dilated ducts.    Pancreas: No mass or peripancreatic fat stranding.    Spleen: Unremarkable.    Adrenals: Unremarkable.    Kidneys/ Ureters: Nonobstructive left nephrolithiasis.    Bladder: No evidence of wall thickening.    Reproductive organs: Unremarkable.    GI Tract/Mesentery: Mild infectious/inflammatory colitis from the splenic flexure to the rectosigmoid junction.  Findings slightly improved from prior.    Peritoneal Space: No ascites. No free air.    Lymphadenopathy: No significant adenopathy.    Vasculature: No significant atherosclerosis or aneurysm.                                        ASSESSMENT/PLAN:      pt has had no BM's today, no stool studies have been obtained.  He feels constipated.  Afebrile, abdomen is soft, non distended and non tender.  Encouraged ambulation.  Will give mg citrate + oral dulcolax this PM - should be ready for D/C soon with close out pt f/u with Dr Avitia.        Thank you very much for the consult.  I will continue to follow.  Please do not hesitate to contact me with any questions or concerns.    Jesse Akhtar Jr, MD

## 2024-12-08 NOTE — NURSING
Notified Dr. Akhtar of patient and situation, ISBAR provided, asked provider if he will be rounding on patient today. Provider stated he will be by this afternoon to round on patient.

## 2024-12-09 VITALS
DIASTOLIC BLOOD PRESSURE: 67 MMHG | WEIGHT: 295.75 LBS | HEIGHT: 65 IN | SYSTOLIC BLOOD PRESSURE: 153 MMHG | BODY MASS INDEX: 49.27 KG/M2 | RESPIRATION RATE: 18 BRPM | OXYGEN SATURATION: 99 % | HEART RATE: 68 BPM | TEMPERATURE: 98 F

## 2024-12-09 PROBLEM — K51.90 EXACERBATION OF ULCERATIVE COLITIS: Status: RESOLVED | Noted: 2024-12-06 | Resolved: 2024-12-09

## 2024-12-09 LAB
ALBUMIN SERPL BCP-MCNC: 4.2 G/DL (ref 3.5–5.2)
ALP SERPL-CCNC: 65 U/L (ref 55–135)
ALT SERPL W/O P-5'-P-CCNC: 14 U/L (ref 10–44)
ANION GAP SERPL CALC-SCNC: 8 MMOL/L (ref 8–16)
AST SERPL-CCNC: 13 U/L (ref 10–40)
BASOPHILS # BLD AUTO: 0.01 K/UL (ref 0–0.2)
BASOPHILS NFR BLD: 0.1 % (ref 0–1.9)
BILIRUB SERPL-MCNC: 0.4 MG/DL (ref 0.1–1)
BUN SERPL-MCNC: 21 MG/DL (ref 6–20)
CALCIUM SERPL-MCNC: 10 MG/DL (ref 8.7–10.5)
CHLORIDE SERPL-SCNC: 99 MMOL/L (ref 95–110)
CO2 SERPL-SCNC: 26 MMOL/L (ref 23–29)
CREAT SERPL-MCNC: 1.2 MG/DL (ref 0.5–1.4)
DIFFERENTIAL METHOD BLD: ABNORMAL
EOSINOPHIL # BLD AUTO: 0 K/UL (ref 0–0.5)
EOSINOPHIL NFR BLD: 0.2 % (ref 0–8)
ERYTHROCYTE [DISTWIDTH] IN BLOOD BY AUTOMATED COUNT: 16.2 % (ref 11.5–14.5)
EST. GFR  (NO RACE VARIABLE): >60 ML/MIN/1.73 M^2
GLUCOSE SERPL-MCNC: 179 MG/DL (ref 70–110)
HCT VFR BLD AUTO: 35.3 % (ref 40–54)
HGB BLD-MCNC: 10.7 G/DL (ref 14–18)
IMM GRANULOCYTES # BLD AUTO: 0.08 K/UL (ref 0–0.04)
IMM GRANULOCYTES NFR BLD AUTO: 0.6 % (ref 0–0.5)
LYMPHOCYTES # BLD AUTO: 0.8 K/UL (ref 1–4.8)
LYMPHOCYTES NFR BLD: 6 % (ref 18–48)
MAGNESIUM SERPL-MCNC: 1.7 MG/DL (ref 1.6–2.6)
MCH RBC QN AUTO: 26.8 PG (ref 27–31)
MCHC RBC AUTO-ENTMCNC: 30.3 G/DL (ref 32–36)
MCV RBC AUTO: 89 FL (ref 82–98)
MONOCYTES # BLD AUTO: 0.3 K/UL (ref 0.3–1)
MONOCYTES NFR BLD: 2.6 % (ref 4–15)
NEUTROPHILS # BLD AUTO: 11.3 K/UL (ref 1.8–7.7)
NEUTROPHILS NFR BLD: 90.5 % (ref 38–73)
NRBC BLD-RTO: 0 /100 WBC
PLATELET # BLD AUTO: 243 K/UL (ref 150–450)
PMV BLD AUTO: 11 FL (ref 9.2–12.9)
POTASSIUM SERPL-SCNC: 5.2 MMOL/L (ref 3.5–5.1)
PROT SERPL-MCNC: 7.4 G/DL (ref 6–8.4)
RBC # BLD AUTO: 3.99 M/UL (ref 4.6–6.2)
SODIUM SERPL-SCNC: 133 MMOL/L (ref 136–145)
WBC # BLD AUTO: 12.52 K/UL (ref 3.9–12.7)

## 2024-12-09 PROCEDURE — 36415 COLL VENOUS BLD VENIPUNCTURE: CPT | Performed by: INTERNAL MEDICINE

## 2024-12-09 PROCEDURE — 25000003 PHARM REV CODE 250: Performed by: INTERNAL MEDICINE

## 2024-12-09 PROCEDURE — 63600175 PHARM REV CODE 636 W HCPCS: Performed by: INTERNAL MEDICINE

## 2024-12-09 PROCEDURE — 80053 COMPREHEN METABOLIC PANEL: CPT | Performed by: INTERNAL MEDICINE

## 2024-12-09 PROCEDURE — 99221 1ST HOSP IP/OBS SF/LOW 40: CPT | Mod: ,,, | Performed by: FAMILY MEDICINE

## 2024-12-09 PROCEDURE — 83735 ASSAY OF MAGNESIUM: CPT | Performed by: INTERNAL MEDICINE

## 2024-12-09 PROCEDURE — 83993 ASSAY FOR CALPROTECTIN FECAL: CPT | Performed by: INTERNAL MEDICINE

## 2024-12-09 PROCEDURE — 85025 COMPLETE CBC W/AUTO DIFF WBC: CPT | Performed by: INTERNAL MEDICINE

## 2024-12-09 PROCEDURE — 63600175 PHARM REV CODE 636 W HCPCS: Mod: JZ,JG

## 2024-12-09 PROCEDURE — 25000003 PHARM REV CODE 250

## 2024-12-09 RX ORDER — BISACODYL 5 MG
10 TABLET, DELAYED RELEASE (ENTERIC COATED) ORAL ONCE
Status: COMPLETED | OUTPATIENT
Start: 2024-12-09 | End: 2024-12-09

## 2024-12-09 RX ORDER — PREDNISONE 20 MG/1
40 TABLET ORAL DAILY
Status: DISCONTINUED | OUTPATIENT
Start: 2024-12-09 | End: 2024-12-09 | Stop reason: HOSPADM

## 2024-12-09 RX ORDER — BUDESONIDE 3 MG/1
9 CAPSULE, COATED PELLETS ORAL DAILY
Qty: 180 EACH | Refills: 0 | Status: SHIPPED | OUTPATIENT
Start: 2024-12-09

## 2024-12-09 RX ADMIN — METHYLNALTREXONE BROMIDE 8 MG: 12 INJECTION, SOLUTION SUBCUTANEOUS at 11:12

## 2024-12-09 RX ADMIN — PREDNISONE 40 MG: 20 TABLET ORAL at 08:12

## 2024-12-09 RX ADMIN — SODIUM ZIRCONIUM CYCLOSILICATE 5 G: 5 POWDER, FOR SUSPENSION ORAL at 08:12

## 2024-12-09 RX ADMIN — ESCITALOPRAM OXALATE 10 MG: 10 TABLET ORAL at 08:12

## 2024-12-09 RX ADMIN — BISACODYL 10 MG: 5 TABLET, COATED ORAL at 08:12

## 2024-12-09 RX ADMIN — BUSPIRONE HYDROCHLORIDE 15 MG: 5 TABLET ORAL at 08:12

## 2024-12-09 RX ADMIN — HYDROCODONE BITARTRATE AND ACETAMINOPHEN 1 TABLET: 5; 325 TABLET ORAL at 01:12

## 2024-12-09 RX ADMIN — HYDROMORPHONE HYDROCHLORIDE 1 MG: 1 INJECTION, SOLUTION INTRAMUSCULAR; INTRAVENOUS; SUBCUTANEOUS at 03:12

## 2024-12-09 RX ADMIN — FAMOTIDINE 20 MG: 20 TABLET ORAL at 08:12

## 2024-12-09 NOTE — PLAN OF CARE
Pt clear for DC from case management standpoint once GI clears. Discharging to home. Patient will drive self home         12/09/24 0857   Final Note   Assessment Type Final Discharge Note   Anticipated Discharge Disposition Home   Hospital Resources/Appts/Education Provided Appointments scheduled and added to AVS

## 2024-12-09 NOTE — PROGRESS NOTES
12/09/24 1239   Discharge Delays   Discharge Delays (!) Waiting for Provider to Speak to Patient

## 2024-12-09 NOTE — DISCHARGE SUMMARY
[facility]  Discharge Summary      Patient Name: Jacoby Jean-Baptiste  MRN: [id number]  DAYSI: [id number]  Patient Class: IP- Inpatient  Admission Date: 12/6/2024  Hospital Length of Stay: 1 days  Discharge Date and Time:  12/09/2024 2:16 PM  Attending Physician: Karyn att. providers found   Discharging Provider: Sofie Prasad MD  Primary Care Provider: Hunter Aguilar III, MD    Primary Care Team: Networked reference to record PCT     HPI:   49 year old pt getting admitted with ulcerative colitis flare  Abdominal pain and diarrhea due to UC is a chronic normal issue  for pt   Today pt started having bloody stools and came to ER and got admitted  He was recently started on an inj for UC and just had first dose ,few days ago  Denied any other issues   Pt was on PO Budesonide with tapered doses few weeks ago    * No surgery found *      Hospital Course:   49-year-old gentleman past medical history significant for ulcerative colitis multiple recurrent hospitalizations for the same presents with few day history of several bouts of bloody diarrhea.  He presents with stable hemoglobin, stable labs and is hemodynamically otherwise stable.  Gastroenterology was consulted.  His ESR and CRP were relatively unimpressive as compared to his historical numbers.  He is continued on IV steroids during his hospitalization and discharged on budesonide 9 mg q.day. of note, patient recently upgraded his ulcerative colitis treatment with injection advanced medication as prescribed by his longitudinal gastroenterologist.  I am hoping that this will allow him to have some improvement in the future.  However, initially C diff and fecal calprotectin were ordered, however, during his stay he received multiple doses of opioids and did not have a subsequent bowel movement after presentation and admission.  He is seen and examined on day of discharge and gastroenterologist agrees that he may be discharged home.   He did have mild hyperkalemia of 5.2.  I have sent him scripts for Lokelma to take twice daily with the instructions to obtain a BMP from his primary care provider in 1 week.  This was discussed verbally and given to him in written discharge instructions.  Patient demonstrates understanding.     Goals of Care Treatment Preferences:  Code Status: Full Code      SDOH Screening:  The patient declined to be screened for utility difficulties, food insecurity, transport difficulties, housing insecurity, and interpersonal safety, so no concerns could be identified this admission.     Consults:   Consults (From admission, onward)          Status Ordering Provider     Inpatient consult to Gastroenterology  Once        Provider:  Jesse Akhtar Jr., MD    Acknowledged LUCERO CENTENO            * Ulcerative colitis flare  Per GIs recommendation discharge on budesonide 9 mg q.day, follow up with his longitudinal gastroenterologist      DVT (deep venous thrombosis)  H/o noted  Maintain xarelto  Last month pt had USS and report as follows  No thrombus in the central veins of the right upper extremity. Previously noted thrombus within the right basilic vein is no longer definitively visualized.         Long-term use of immunosuppressant medication  Aware       PUD (peptic ulcer disease)  Aware  Maintain pepcid      History of Clostridioides difficile colitis  C diff antigen with reflex to toxin ordered, order will be canceled if patient is without bowel movements 48 after the order was placed      Type 2 diabetes mellitus without complication, without long-term current use of insulin  Stable    Latest Reference Range & Units 04/21/23 05:25 10/27/23 06:41 03/16/24 14:04 06/06/24 04:51 09/11/24 10:48 11/02/24 04:56   Hemoglobin A1C External 4.5 - 6.2 % 4.8 5.2 5.7 5.2 4.9 5.8         Morbid obesity  Body mass index is 50.42 kg/m². Morbid obesity complicates all aspects of disease management from diagnostic modalities to  treatment.       Final Active Diagnoses:    Diagnosis Date Noted POA    PRINCIPAL PROBLEM:  Ulcerative colitis flare [K51.90] 01/11/2023 Yes    DVT (deep venous thrombosis) [I82.409] 12/06/2024 Yes    Long-term use of immunosuppressant medication [Z79.60] 12/02/2023 Not Applicable    PUD (peptic ulcer disease) [K27.9] 04/24/2023 Yes    History of Clostridioides difficile colitis [Z86.19] 04/20/2023 Not Applicable     Chronic    Type 2 diabetes mellitus without complication, without long-term current use of insulin [E11.9] 01/29/2022 Yes    Morbid obesity [E66.01] 02/03/2021 Yes      Problems Resolved During this Admission:    Diagnosis Date Noted Date Resolved POA    Exacerbation of ulcerative colitis [K51.90] 12/06/2024 12/09/2024 Yes       Discharged Condition: good    Disposition: Home or Self Care    Follow Up:   Follow-up Information       Shan Jean III, MD. Schedule an appointment as soon as possible for a visit in 1 week(s).    Specialty: Gastroenterology  Why: Call and schedule your follow up- we are unable to make this appointment for you.  Contact information:  [address] [facility] [address] [phone]               Hunter Aguilar III, MD. Go on 12/18/2024.    Specialty: Family Medicine  Why: obtain BMP  Appointment at 0930  Contact information:  [address] [facility] [address]  [id number]  [place] [address] [phone]                           Patient Instructions:      Diet diabetic       Significant Diagnostic Studies: Labs: BMP:   Recent Labs   Lab 12/08/24  0657 12/09/24  0342   * 179*   * 133*   K 5.2* 5.2*    99   CO2 26 26   BUN 19 21*   CREATININE 1.1 1.2   CALCIUM 9.6 10.0   MG 1.6 1.7    and CBC   Recent Labs   Lab 12/08/24  0657 12/09/24  0342   WBC 10.18 12.52   HGB 10.2* 10.7*   HCT 32.9* 35.3*    243       Pending Diagnostic Studies:       Procedure Component Value Units Date/Time    Calprotectin, Stool [[id number]] Collected: 12/09/24 1312    Order  Status: Sent Lab Status: In process Updated: 12/09/24 1317    Specimen: Stool            Medications:  Reconciled Home Medications:      Medication List        START taking these medications      sodium zirconium cyclosilicate 5 gram packet  Commonly known as: Lokelma  Take 1 packet (5 g total) by mouth 2 (two) times a day. Mix entire contents of packet(s) into drinking glass containing 3 tablespoons of water; stir well and drink immediately. Add water and repeat until no powder remains to receive entire dose.            CONTINUE taking these medications      budesonide 3 mg capsule  Commonly known as: ENTOCORT EC  Take 3 capsules (9 mg total) by mouth once daily.     busPIRone 15 MG tablet  Commonly known as: BUSPAR  Take 1 tablet (15 mg total) by mouth 3 (three) times daily.     EScitalopram oxalate 10 MG tablet  Commonly known as: LEXAPRO  Take 1 tablet (10 mg total) by mouth once daily.     HYDROcodone-acetaminophen  mg per tablet  Commonly known as: NORCO  Take 1 tablet by mouth 4 (four) times daily.     metoprolol succinate 50 MG 24 hr tablet  Commonly known as: TOPROL-XL  Take 1 tablet (50 mg total) by mouth once daily.     OZEMPIC 0.25 mg or 0.5 mg (2 mg/3 mL) pen injector  Generic drug: semaglutide  Inject 0.25 mg into the skin every 7 days.     pantoprazole 40 MG tablet  Commonly known as: PROTONIX  Take 40 mg by mouth 2 (two) times daily.     rivaroxaban 20 mg Tab  Commonly known as: XARELTO  Take 1 tablet (20 mg total) by mouth daily with dinner or evening meal.     simvastatin 20 MG tablet  Commonly known as: ZOCOR  Take 1 tablet (20 mg total) by mouth every evening.            STOP taking these medications      ciprofloxacin HCl 500 MG tablet  Commonly known as: CIPRO     ergocalciferol 50,000 unit Cap  Commonly known as: VITAMIN D2     metFORMIN 500 MG ER 24hr tablet  Commonly known as: GLUCOPHAGE-XR     metroNIDAZOLE 500 MG tablet  Commonly known as: FLAGYL     oxyCODONE-acetaminophen 7.5-325  mg per tablet  Commonly known as: PERCOCET     predniSONE 10 MG tablet  Commonly known as: DELTASONE     sertraline 100 MG tablet  Commonly known as: ZOLOFT            ASK your doctor about these medications      colchicine 0.6 mg tablet  Commonly known as: COLCRYS  Take 1 tablet (0.6 mg total) by mouth 2 (two) times daily.     hyoscyamine 0.375 mg Tb12  Commonly known as: Levbid  Take 0.375 mg by mouth 2 (two) times daily.     LORazepam 0.5 MG tablet  Commonly known as: ATIVAN  Take 1 tablet (0.5 mg total) by mouth every 6 (six) hours as needed for Anxiety.     promethazine 25 MG tablet  Commonly known as: PHENERGAN  Take 25 mg by mouth 4 (four) times daily as needed for Nausea.     zolpidem 10 mg Tab  Commonly known as: AMBIEN  Take 1 tablet (10 mg total) by mouth nightly as needed (insomnia).              Indwelling Lines/Drains at time of discharge:   Lines/Drains/Airways       None                   Time spent on the discharge of patient: 35 minutes         Sofie Prasad MD  Department of Hospital Medicine  [facility]

## 2024-12-09 NOTE — NURSING
Notified Dr. Akhtar to please place orders for magnesium citrate and/or dulcolax as mentioned per MD notes. MD voiced understanding and stated that he will place orders.

## 2024-12-09 NOTE — ASSESSMENT & PLAN NOTE
Per GIs recommendation discharge on budesonide 9 mg q.day, follow up with his longitudinal gastroenterologist

## 2024-12-09 NOTE — DISCHARGE INSTRUCTIONS
Please take budesonide 9 mg daily until you followed with your gastroenterologist. Thank you for allowing me to participate in your care.  Additionally, I have sent Lokelma to your pharmacy.  You should take these twice daily and you need to obtain a basic metabolic profile with the your primary care provider in 1 week to determine your ongoing need potassium binders.

## 2024-12-09 NOTE — CONSULTS
Chief complaint:  Abdominal Pain (X FEW DAYS) and Rectal Bleeding      HPI:  Jacoby Jean-Baptiste is a 49 y.o. male presenting with a left arm surgical wound from an abscess. Pt known to me from OP clinic, and has been treating for past few months. No other complaints today    PMH:  As per HPI and below:  Past Medical History:   Diagnosis Date    C. difficile colitis     Cavitary pneumonia 11/2023    pos aspergillus serology    Diabetes mellitus 01/2022    Hyperlipidemia 2015    Hypertension 2015    Insomnia 2015    Ulcerative colitis        Social History     Socioeconomic History    Marital status: Single   Tobacco Use    Smoking status: Former     Average packs/day: 1 pack/day for 30.0 years (30.0 ttl pk-yrs)     Types: Cigarettes     Start date: 1993    Smokeless tobacco: Never    Tobacco comments:     Pt states he has stopped smoking with Chantix three weeks ago. 12/1/23   Substance and Sexual Activity    Alcohol use: Yes     Comment: ocass    Drug use: Not Currently    Sexual activity: Not Currently     Social Drivers of Health     Financial Resource Strain: Patient Declined (12/6/2024)    Overall Financial Resource Strain (CARDIA)     Difficulty of Paying Living Expenses: Patient declined   Food Insecurity: Patient Declined (12/6/2024)    Hunger Vital Sign     Worried About Running Out of Food in the Last Year: Patient declined     Ran Out of Food in the Last Year: Patient declined   Transportation Needs: Patient Declined (12/6/2024)    TRANSPORTATION NEEDS     Transportation : Patient declined   Physical Activity: Sufficiently Active (9/11/2024)    Exercise Vital Sign     Days of Exercise per Week: 5 days     Minutes of Exercise per Session: 30 min   Recent Concern: Physical Activity - Inactive (8/10/2024)    Exercise Vital Sign     Days of Exercise per Week: 0 days     Minutes of Exercise per Session: 0 min   Stress: Patient Declined (12/6/2024)    [employer] - Occupational Stress  Questionnaire     Feeling of Stress : Patient declined   Recent Concern: Stress - Stress Concern Present (11/22/2024)    [employer] [place] - Occupational Stress Questionnaire     Feeling of Stress : Rather much   Housing Stability: Patient Declined (12/6/2024)    Housing Stability Vital Sign     Unable to Pay for Housing in the Last Year: Patient declined     Homeless in the Last Year: Patient declined       Past Surgical History:   Procedure Laterality Date    BRONCHOSCOPY WITH FLUOROSCOPY Left 11/20/2023    Procedure: BRONCHOSCOPY, WITH FLUOROSCOPY;  Surgeon: Lisa Villarreal MD;  Location: [facility];  Service: Pulmonary;  Laterality: Left;    BRONCHOSCOPY WITH FLUOROSCOPY N/A 11/30/2023    Procedure: BRONCHOSCOPY, WITH FLUOROSCOPY;  Surgeon: Lisa Vilalrreal MD;  Location: [facility];  Service: Pulmonary;  Laterality: N/A;    CARDIAC ELECTROPHYSIOLOGY MAPPING AND ABLATION  2017    SVT    CHOLECYSTECTOMY  2011    COLONOSCOPY N/A 5/3/2022    Procedure: COLONOSCOPY;  Surgeon: Shan Jean III, MD;  Location: [facility];  Service: Endoscopy;  Laterality: N/A;    COLONOSCOPY N/A 3/16/2024    Procedure: COLONOSCOPY;  Surgeon: ALEKSANDER Ramos MD;  Location: [facility];  Service: Endoscopy;  Laterality: N/A;    COLONOSCOPY WITH FECAL MICROBIOTA TRANSFER N/A 3/21/2023    Procedure: COLONOSCOPY, WITH FECAL MICROBIOTA TRANSFER;  Surgeon: David Millan MD;  Location: [facility];  Service: Endoscopy;  Laterality: N/A;    ESOPHAGOGASTRODUODENOSCOPY N/A 4/24/2023    Procedure: EGD (ESOPHAGOGASTRODUODENOSCOPY);  Surgeon: Shan Jean III, MD;  Location: [facility];  Service: Endoscopy;  Laterality: N/A;    ESOPHAGOGASTRODUODENOSCOPY N/A 6/5/2024    Procedure: EGD (ESOPHAGOGASTRODUODENOSCOPY);  Surgeon: Odalis Mccabe MD;  Location: [facility];  Service: Endoscopy;  Laterality: N/A;    FLEXIBLE SIGMOIDOSCOPY N/A 6/5/2024    Procedure: SIGMOIDOSCOPY, FLEXIBLE;  Surgeon: Eliot  Odalis RIDER MD;  Location: [facility];  Service: Endoscopy;  Laterality: N/A;    FLEXIBLE SIGMOIDOSCOPY N/A 7/16/2024    Procedure: SIGMOIDOSCOPY, FLEXIBLE;  Surgeon: Shan Jean III, MD;  Location: [facility] END[facility];  Service: Endoscopy;  Laterality: N/A;    FLEXIBLE SIGMOIDOSCOPY N/A 8/13/2024    Procedure: SIGMOIDOSCOPY, FLEXIBLE;  Surgeon: Shan Jean III, MD;  Location: [facility];  Service: Endoscopy;  Laterality: N/A;    GANGLION CYST EXCISION Left 1992    UMBILICAL HERNIA REPAIR  2011    with mesh       Family History   Problem Relation Name Age of Onset    Asthma Mother         Review of patient's allergies indicates:  No Known Allergies    Current Facility-Administered Medications on File Prior to Encounter   Medication Dose Route Frequency Provider Last Rate Last Admin    lactated ringers infusion   Intravenous Continuous David Millan MD 75 mL/hr at 03/21/23 1252 New Bag at 03/21/23 1252     Current Outpatient Medications on File Prior to Encounter   Medication Sig Dispense Refill    busPIRone (BUSPAR) 15 MG tablet Take 1 tablet (15 mg total) by mouth 3 (three) times daily. 90 tablet 5    colchicine (COLCRYS) 0.6 mg tablet Take 1 tablet (0.6 mg total) by mouth 2 (two) times daily. 180 tablet 0    EScitalopram oxalate (LEXAPRO) 10 MG tablet Take 1 tablet (10 mg total) by mouth once daily. 30 tablet 2    HYDROcodone-acetaminophen (NORCO)  mg per tablet Take 1 tablet by mouth 4 (four) times daily.      hyoscyamine (LEVBID) 0.375 mg Tb12 Take 0.375 mg by mouth 2 (two) times daily.      LORazepam (ATIVAN) 0.5 MG tablet Take 1 tablet (0.5 mg total) by mouth every 6 (six) hours as needed for Anxiety. 90 tablet 5    metoprolol succinate (TOPROL-XL) 50 MG 24 hr tablet Take 1 tablet (50 mg total) by mouth once daily. 90 tablet 1    pantoprazole (PROTONIX) 40 MG tablet Take 40 mg by mouth 2 (two) times daily.      promethazine (PHENERGAN) 25 MG tablet Take 25 mg by mouth 4 (four) times daily as  needed for Nausea.      rivaroxaban (XARELTO) 20 mg Tab Take 1 tablet (20 mg total) by mouth daily with dinner or evening meal. 90 tablet 0    semaglutide (OZEMPIC) 0.25 mg or 0.5 mg (2 mg/3 mL) pen injector Inject 0.25 mg into the skin every 7 days. (Patient taking differently: Inject 0.25 mg into the skin every 7 days. FRIDAYS) 3 mL 0    simvastatin (ZOCOR) 20 MG tablet Take 1 tablet (20 mg total) by mouth every evening. 90 tablet 1    zolpidem (AMBIEN) 10 mg Tab Take 1 tablet (10 mg total) by mouth nightly as needed (insomnia). 30 tablet 2    [DISCONTINUED] budesonide (ENTOCORT EC) 3 mg capsule Take 9 mg by mouth once daily.      [DISCONTINUED] ciprofloxacin HCl (CIPRO) 500 MG tablet Take 1 tablet (500 mg total) by mouth every 12 (twelve) hours. (Patient not taking: Reported on 12/6/2024) 4 tablet 0    [DISCONTINUED] ergocalciferol (VITAMIN D2) 50,000 unit Cap Take 1 capsule (50,000 Units total) by mouth every 7 days. (Patient not taking: Reported on 12/6/2024) 12 capsule 1    [DISCONTINUED] metFORMIN (GLUCOPHAGE-XR) 500 MG ER 24hr tablet Take 500 mg by mouth 2 (two) times daily with meals.      [DISCONTINUED] metroNIDAZOLE (FLAGYL) 500 MG tablet Take 1 tablet (500 mg total) by mouth every 8 (eight) hours. (Patient not taking: Reported on 12/6/2024) 6 tablet 0    [DISCONTINUED] oxyCODONE-acetaminophen (PERCOCET) 7.5-325 mg per tablet Take 1 tablet by mouth every 8 (eight) hours as needed for Pain. (Patient not taking: Reported on 12/6/2024) 15 tablet 0    [DISCONTINUED] predniSONE (DELTASONE) 10 MG tablet Take 3 tablets (30 mg total) by mouth once daily for 5 days, THEN 2 tablets (20 mg total) once daily for 5 days, THEN 1 tablet (10 mg total) once daily for 5 days, THEN 0.5 tablets (5 mg total) once daily for 5 days. (Patient not taking: Reported on 12/6/2024) 33 tablet 0    [DISCONTINUED] sertraline (ZOLOFT) 100 MG tablet Take 100 mg by mouth once daily.         ROS: As per HPI and below:  Pertinent items are  "noted in HPI.      Physical Exam:     Vitals:    24 0104 24 0358 24 0359 24 0701   BP:  (!) 156/94  (!) 153/67   BP Location:       Patient Position:       Pulse:  68  68   Resp: 18 18 18 18   Temp:  97.4 °F (36.3 °C)  98.3 °F (36.8 °C)   TempSrc:  Oral  Oral   SpO2:  99%  99%   Weight:       Height:           BP  Min: 120/86  Max: 158/88  Temp  Av.9 °F (36.6 °C)  Min: 97.3 °F (36.3 °C)  Max: 98.3 °F (36.8 °C)  Pulse  Av.1  Min: 65  Max: 87  Resp  Av.2  Min: 14  Max: 18  SpO2  Av.3 %  Min: 96 %  Max: 100 %  Height  Av' 5" (165.1 cm)  Min: 5' 5" (165.1 cm)  Max: 5' 5" (165.1 cm)  Weight  Av.8 kg (299 lb 6 oz)  Min: 134.1 kg (295 lb 12 oz)  Max: 137.4 kg (303 lb)    Body mass index is 49.21 kg/m².          General:             Well developed, well nourished, no apparent distress  HEENT:              NCAT, no JVD, mucous membranes moist, EOM intact  Cardiovascular:  Regular rate and rhythm, normal S1, normal S2, No murmurs, rubs, or gallops  Respiratory:        Normal breath sounds, no wheezes, no rales, no rhonchi  Abdomen:           Bowel sounds present, non tender, non distended, no masses, no hepatojugular reflux  Extremities:        No clubbing, no cyanosis, no edema  Vascular:            2+ b/l radial.  Peripheral pulses intact.  No carotid bruits.  Neurological:      No focal deficits  Skin:                   No obvious rashes or erythema, left arm surgical wound            Lab Results   Component Value Date    WBC 12.52 2024    HGB 10.7 (L) 2024    HCT 35.3 (L) 2024    MCV 89 2024     2024     Lab Results   Component Value Date    CHOL 162 2024    CHOL 128 2024    CHOL 142 10/27/2023     Lab Results   Component Value Date    HDL 48 2024    HDL 69 2024    HDL 44 10/27/2023     Lab Results   Component Value Date    LDLCALC 88.4 2024    LDLCALC 33.0 (L) 2024    LDLCALC 81.4 10/27/2023 "     Lab Results   Component Value Date    TRIG 128 11/01/2024    TRIG 130 09/25/2024    TRIG 83 10/27/2023     Lab Results   Component Value Date    CHOLHDL 29.6 11/01/2024    CHOLHDL 53.9 (H) 09/25/2024    CHOLHDL 31.0 10/27/2023     CMP  Recent Labs   Lab 12/09/24  0342   *   CALCIUM 10.0   ALBUMIN 4.2   PROT 7.4   *   K 5.2*   CO2 26   CL 99   BUN 21*   CREATININE 1.2   ALKPHOS 65   ALT 14   AST 13   BILITOT 0.4      Lab Results   Component Value Date    TSH 0.515 11/01/2024       Assessment and Recommendations       Diagnoses:    Surgical wound of the left arm    Plan:  Hydrofera blue packed into the arm daily    Complexity:    moderate

## 2024-12-09 NOTE — HOSPITAL COURSE
49-year-old gentleman past medical history significant for ulcerative colitis multiple recurrent hospitalizations for the same presents with few day history of several bouts of bloody diarrhea.  He presents with stable hemoglobin, stable labs and is hemodynamically otherwise stable.  Gastroenterology was consulted.  His ESR and CRP were relatively unimpressive as compared to his historical numbers.  He is continued on IV steroids during his hospitalization and discharged on budesonide 9 mg q.day. of note, patient recently upgraded his ulcerative colitis treatment with injection advanced medication as prescribed by his longitudinal gastroenterologist.  I am hoping that this will allow him to have some improvement in the future.  However, initially C diff and fecal calprotectin were ordered, however, during his stay he received multiple doses of opioids and did not have a subsequent bowel movement after presentation and admission.  He is seen and examined on day of discharge and gastroenterologist agrees that he may be discharged home.  He did have mild hyperkalemia of 5.2.  I have sent him scripts for Lokelma to take twice daily with the instructions to obtain a BMP from his primary care provider in 1 week.  This was discussed verbally and given to him in written discharge instructions.  Patient demonstrates understanding.

## 2024-12-11 ENCOUNTER — OFFICE VISIT (OUTPATIENT)
Dept: WOUND CARE | Facility: HOSPITAL | Age: 49
End: 2024-12-11
Attending: FAMILY MEDICINE
Payer: COMMERCIAL

## 2024-12-11 VITALS
TEMPERATURE: 98 F | HEART RATE: 99 BPM | RESPIRATION RATE: 18 BRPM | SYSTOLIC BLOOD PRESSURE: 107 MMHG | DIASTOLIC BLOOD PRESSURE: 65 MMHG

## 2024-12-11 DIAGNOSIS — S41.102D OPEN WOUND OF LEFT UPPER ARM, SUBSEQUENT ENCOUNTER: Primary | ICD-10-CM

## 2024-12-11 DIAGNOSIS — L02.818 CUTANEOUS ABSCESS OF OTHER SITE: ICD-10-CM

## 2024-12-11 PROCEDURE — 3074F SYST BP LT 130 MM HG: CPT | Mod: CPTII,,, | Performed by: FAMILY MEDICINE

## 2024-12-11 PROCEDURE — 99213 OFFICE O/P EST LOW 20 MIN: CPT | Mod: ,,, | Performed by: FAMILY MEDICINE

## 2024-12-11 PROCEDURE — 1111F DSCHRG MED/CURRENT MED MERGE: CPT | Mod: CPTII,,, | Performed by: FAMILY MEDICINE

## 2024-12-11 PROCEDURE — 3066F NEPHROPATHY DOC TX: CPT | Mod: CPTII,,, | Performed by: FAMILY MEDICINE

## 2024-12-11 PROCEDURE — 1160F RVW MEDS BY RX/DR IN RCRD: CPT | Mod: CPTII,,, | Performed by: FAMILY MEDICINE

## 2024-12-11 PROCEDURE — 1159F MED LIST DOCD IN RCRD: CPT | Mod: CPTII,,, | Performed by: FAMILY MEDICINE

## 2024-12-11 PROCEDURE — 3078F DIAST BP <80 MM HG: CPT | Mod: CPTII,,, | Performed by: FAMILY MEDICINE

## 2024-12-11 PROCEDURE — 99214 OFFICE O/P EST MOD 30 MIN: CPT | Mod: PN | Performed by: FAMILY MEDICINE

## 2024-12-11 PROCEDURE — 3061F NEG MICROALBUMINURIA REV: CPT | Mod: CPTII,,, | Performed by: FAMILY MEDICINE

## 2024-12-11 PROCEDURE — 3044F HG A1C LEVEL LT 7.0%: CPT | Mod: CPTII,,, | Performed by: FAMILY MEDICINE

## 2024-12-11 NOTE — PROGRESS NOTES
"          Wound Care Progress Note    Subjective:       Patient ID: Jacoby Jean-Baptiste is a 49 y.o. male.    Chief Complaint: Wound Care    HPI  Pt seen in clinic for a FU visit for a left arm surgical wound from an abscess. Wound continues to slowly improve. Pt was inpatient over the weekend. No new complaints today  Review of Systems   Skin:  Positive for wound.        Open wound left arm   All other systems reviewed and are negative.        Objective:        Physical Exam  Vitals and nursing note reviewed.   Skin:     General: Skin is warm and dry.      Findings: Lesion present.      Comments: Left arm surgical wound, see wound care assessment documentation in chart review scanned under the media tab   Neurological:      General: No focal deficit present.      Mental Status: He is alert.   Psychiatric:         Judgment: Judgment normal.         Vitals:    12/11/24 1018   BP: 107/65   Pulse: 99   Resp: 18   Temp: 98.1 °F (36.7 °C)       Assessment:           ICD-10-CM ICD-9-CM   1. Open wound of left upper arm, subsequent encounter  S41.102D V58.89     880.03   2. Cutaneous abscess of other site  L02.818 682.8                Plan:                  Jacoby Waters" was seen today for wound care.    Diagnoses and all orders for this visit:    Open wound of left upper arm, subsequent encounter    Cutaneous abscess of other site      Please see wound care instructions which have been provided separately.             "

## 2024-12-12 LAB — CALPROTECTIN STL-MCNT: 363 UG/G (ref 0–120)

## 2024-12-13 ENCOUNTER — TELEPHONE (OUTPATIENT)
Dept: FAMILY MEDICINE | Facility: CLINIC | Age: 49
End: 2024-12-13

## 2024-12-13 ENCOUNTER — DOCUMENT SCAN (OUTPATIENT)
Dept: HOME HEALTH SERVICES | Facility: HOSPITAL | Age: 49
End: 2024-12-13
Payer: COMMERCIAL

## 2024-12-13 NOTE — TELEPHONE ENCOUNTER
"Discharge Information     Discharge Date:  12/09/2024          Primary Discharge Diagnosis:  Ulcerative colitis flare           How patient is feeling since discharge from the hospital?  States it is what it is he jus deals with it          Medication & Order Review     Discharge Medication Review:    Medication reconciliation performed No   If no, state reason why not performed: N/A       Did patient have any difficulty/problems filling prescriptions?  No           If yes, state reason and steps taken to assist in resolving issue: N/A     Does patient have any questions regarding medications?  No           If yes, state question and steps taken to answer question:  N/A    Was Home Health and/or any equipment ordered for patient upon discharge?  No          Home Health No   If yes, has home health contacted patient and/or initiated services? N/A   Name of Home Health Agency N/A   Durable Medical Equipment (DME)  No (Example: walker, wheelchair, nebulizer)   If yes, has the DME provider contacted patient and delivered equipment? N/A    DME Company N/A     Red Flag Review     Was patient educated on "red flags" or things to watch for?  No       If yes:  "Red flags" patient was told to watch for:                                                    Other:              Is patient experiencing any red flags today (or any of these symptoms) (List examples of red flags specifically)?  No       Notes:                      If no:    Educated the patient on 3 "red flags" to watch for:                                                    Other:  Refuses to answer any questions or make a sooner appointment                Is patient experiencing any red flags today (or any of these symptoms) (List examples of red flags specifically)?  No      Notes:        Patient Education & Follow Up               Phone number patient will call if having any questions or problems:        Appointment scheduled?  Yes      Follow-up/transition of " care appointment, including the date/time and location of your appointment:  Patient was able to state the follow-up appointment correctly.        Notes:  refuses a sooner appointment had appointment for  12/27        Provided patient with date, time and location of follow-up appointment if they do not have it:  Yes      Rescheduled transition of care appointment if the patient has a conflict:    Appointment re-scheduled?  No        Patient informed of this appointment?  No      Notes:            3 questions patient would like to ask physician during appointment:

## 2024-12-16 ENCOUNTER — OFFICE VISIT (OUTPATIENT)
Dept: CARDIOLOGY | Facility: CLINIC | Age: 49
End: 2024-12-16
Payer: COMMERCIAL

## 2024-12-16 VITALS
DIASTOLIC BLOOD PRESSURE: 79 MMHG | SYSTOLIC BLOOD PRESSURE: 122 MMHG | BODY MASS INDEX: 49.99 KG/M2 | WEIGHT: 300.06 LBS | HEART RATE: 84 BPM | OXYGEN SATURATION: 91 % | HEIGHT: 65 IN

## 2024-12-16 DIAGNOSIS — R07.89 OTHER CHEST PAIN: ICD-10-CM

## 2024-12-16 DIAGNOSIS — R42 DIZZY SPELLS: ICD-10-CM

## 2024-12-16 DIAGNOSIS — I82.4Z9 DEEP VEIN THROMBOSIS (DVT) OF DISTAL VEIN OF LOWER EXTREMITY, UNSPECIFIED CHRONICITY, UNSPECIFIED LATERALITY: ICD-10-CM

## 2024-12-16 DIAGNOSIS — R42 DIZZINESS: ICD-10-CM
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
## 2024-12-16 DIAGNOSIS — E66.01 MORBID OBESITY: ICD-10-CM

## 2024-12-16 DIAGNOSIS — F41.9 ANXIETY: ICD-10-CM

## 2024-12-16 DIAGNOSIS — R53.83 FATIGUE, UNSPECIFIED TYPE: ICD-10-CM

## 2024-12-16 DIAGNOSIS — Z86.79 HISTORY OF SUPRAVENTRICULAR TACHYCARDIA: ICD-10-CM

## 2024-12-16 DIAGNOSIS — R06.02 SOB (SHORTNESS OF BREATH): Primary | ICD-10-CM

## 2024-12-16 PROCEDURE — 3044F HG A1C LEVEL LT 7.0%: CPT | Mod: CPTII,S$GLB,,

## 2024-12-16 PROCEDURE — 1111F DSCHRG MED/CURRENT MED MERGE: CPT | Mod: CPTII,S$GLB,,

## 2024-12-16 PROCEDURE — 3066F NEPHROPATHY DOC TX: CPT | Mod: CPTII,S$GLB,,

## 2024-12-16 PROCEDURE — 3078F DIAST BP <80 MM HG: CPT | Mod: CPTII,S$GLB,,

## 2024-12-16 PROCEDURE — 3008F BODY MASS INDEX DOCD: CPT | Mod: CPTII,S$GLB,,

## 2024-12-16 PROCEDURE — 99999 PR PBB SHADOW E&M-EST. PATIENT-LVL III: CPT | Mod: PBBFAC,,,

## 2024-12-16 PROCEDURE — 99214 OFFICE O/P EST MOD 30 MIN: CPT | Mod: S$GLB,,,

## 2024-12-16 PROCEDURE — 3061F NEG MICROALBUMINURIA REV: CPT | Mod: CPTII,S$GLB,,

## 2024-12-16 PROCEDURE — 3074F SYST BP LT 130 MM HG: CPT | Mod: CPTII,S$GLB,,

## 2024-12-16 NOTE — ASSESSMENT & PLAN NOTE
Repeat 5 day cardiac Zio monitor, repeat ECHO.    Encouraged to:  Get up slowly from bed or after sitting for a long time. If you are in bed, roll to your side and swing your legs over the edge of the bed and onto the floor. Push your body up to a sitting position. Wait for a while before you slowly stand up.  Ensure you are staying adequately hydrated.  Choose water and other clear liquids.   Wear compression stockings to help improve blood flow.

## 2024-12-16 NOTE — ASSESSMENT & PLAN NOTE
Currently on BuSpar 15 mg t.i.d., Lexapro 10 mg daily and as needed Ativan 0.5 mg every 6 hours.  He is also taking Ambien 10 mg nightly to help with sleep.  Follows with psychiatry.  Continue the same.

## 2024-12-16 NOTE — PROGRESS NOTES
Subjective:    Patient ID:  Jacoby Jean-Baptiste is a 49 y.o. male who presents for follow-up.   Chief Complaint   Patient presents with    Follow-up     3 month f/u    Shortness of Breath     Ongoing     Dizziness     Ongoing     Fatigue     Ongoing        HPI:  Mr. Jean-Baptiste is a 49-year-old male with a very extensive past medical history who was recently discharged from the hospital last week after an ulcerative colitis flare.  See discharge summary below:  49 year old pt getting admitted with ulcerative colitis flare  Abdominal pain and diarrhea due to UC is a chronic normal issue  for pt   Today pt started having bloody stools and came to ER and got admitted  He was recently started on an inj for UC and just had first dose ,few days ago  Denied any other issues   Pt was on PO Budesonide with tapered doses few weeks ago     * No surgery found *       Hospital Course:   49-year-old gentleman past medical history significant for ulcerative colitis multiple recurrent hospitalizations for the same presents with few day history of several bouts of bloody diarrhea.  He presents with stable hemoglobin, stable labs and is hemodynamically otherwise stable.  Gastroenterology was consulted.  His ESR and CRP were relatively unimpressive as compared to his historical numbers.  He is continued on IV steroids during his hospitalization and discharged on budesonide 9 mg q.day. of note, patient recently upgraded his ulcerative colitis treatment with injection advanced medication as prescribed by his longitudinal gastroenterologist.  I am hoping that this will allow him to have some improvement in the future.  However, initially C diff and fecal calprotectin were ordered, however, during his stay he received multiple doses of opioids and did not have a subsequent bowel movement after presentation and admission.  He is seen and examined on day of discharge and gastroenterologist agrees that he may be discharged home.  He did have mild  "hyperkalemia of 5.2.  I have sent him scripts for Lokelma to take twice daily with the instructions to obtain a BMP from his primary care provider in 1 week.  This was discussed verbally and given to him in written discharge instructions.  Patient demonstrates understanding.     Today he comes in with complaints of a fall yesterday in which he "just fell" states he did not trip or pass out.  States he did not hit his head.  He has been feeling lightheaded and dizzy with complaints of shortness of breath, fatigue and dizziness.  Chest pain has since resolved since finishing the colchicine.  He states he is "always feeling badly" due to his extensive medical history.  States 1 fall in the beginning of November and 1 fall yesterday.  Did have a nuclear stress test earlier this year which was negative.  Denies any further blood in the urine or stool.  States compliance with all of his medications. States he is eating/drinking adequately.     Review of patient's allergies indicates:  No Known Allergies    Past Medical History:   Diagnosis Date    C. difficile colitis     Cavitary pneumonia 11/2023    pos aspergillus serology    Diabetes mellitus 01/2022    Hyperlipidemia 2015    Hypertension 2015    Insomnia 2015    Ulcerative colitis      Past Surgical History:   Procedure Laterality Date    BRONCHOSCOPY WITH FLUOROSCOPY Left 11/20/2023    Procedure: BRONCHOSCOPY, WITH FLUOROSCOPY;  Surgeon: Lisa Villarreal MD;  Location: [facility];  Service: Pulmonary;  Laterality: Left;    BRONCHOSCOPY WITH FLUOROSCOPY N/A 11/30/2023    Procedure: BRONCHOSCOPY, WITH FLUOROSCOPY;  Surgeon: Lisa Villarreal MD;  Location: [facility];  Service: Pulmonary;  Laterality: N/A;    CARDIAC ELECTROPHYSIOLOGY MAPPING AND ABLATION  2017    SVT    CHOLECYSTECTOMY  2011    COLONOSCOPY N/A 5/3/2022    Procedure: COLONOSCOPY;  Surgeon: Shan Jean III, MD;  Location: [facility];  Service: Endoscopy;  Laterality: N/A;    COLONOSCOPY N/A " 3/16/2024    Procedure: COLONOSCOPY;  Surgeon: ALEKSANDER Ramos MD;  Location: [facility];  Service: Endoscopy;  Laterality: N/A;    COLONOSCOPY WITH FECAL MICROBIOTA TRANSFER N/A 3/21/2023    Procedure: COLONOSCOPY, WITH FECAL MICROBIOTA TRANSFER;  Surgeon: David Millan MD;  Location: [facility];  Service: Endoscopy;  Laterality: N/A;    ESOPHAGOGASTRODUODENOSCOPY N/A 4/24/2023    Procedure: EGD (ESOPHAGOGASTRODUODENOSCOPY);  Surgeon: Shan Jean III, MD;  Location: [facility];  Service: Endoscopy;  Laterality: N/A;    ESOPHAGOGASTRODUODENOSCOPY N/A 6/5/2024    Procedure: EGD (ESOPHAGOGASTRODUODENOSCOPY);  Surgeon: Odalis Mccabe MD;  Location: [facility];  Service: Endoscopy;  Laterality: N/A;    FLEXIBLE SIGMOIDOSCOPY N/A 6/5/2024    Procedure: SIGMOIDOSCOPY, FLEXIBLE;  Surgeon: Odalis Mccabe MD;  Location: [facility];  Service: Endoscopy;  Laterality: N/A;    FLEXIBLE SIGMOIDOSCOPY N/A 7/16/2024    Procedure: SIGMOIDOSCOPY, FLEXIBLE;  Surgeon: Shan Jean III, MD;  Location: [facility];  Service: Endoscopy;  Laterality: N/A;    FLEXIBLE SIGMOIDOSCOPY N/A 8/13/2024    Procedure: SIGMOIDOSCOPY, FLEXIBLE;  Surgeon: Shan Jean III, MD;  Location: [facility];  Service: Endoscopy;  Laterality: N/A;    GANGLION CYST EXCISION Left 1992    UMBILICAL HERNIA REPAIR  2011    with mesh     Social History     Tobacco Use    Smoking status: Former     Average packs/day: 1 pack/day for 30.0 years (30.0 ttl pk-yrs)     Types: Cigarettes     Start date: 1993    Smokeless tobacco: Never    Tobacco comments:     Pt states he has stopped smoking with Chantix three weeks ago. 12/1/23   Substance Use Topics    Alcohol use: Yes     Comment: ocass    Drug use: Not Currently     Family History   Problem Relation Name Age of Onset    Asthma Mother          Review of Systems:   Constitution: Negative for diaphoresis and fever.   HEENT: Negative for nosebleeds.    Cardiovascular: Negative for chest  pain       + dyspnea on exertion        No leg swelling        No palpitations  Respiratory: + for shortness of breath and wheezing.    Hematologic/Lymphatic: Negative for bleeding problem. Does not bruise/bleed easily.   Skin: Negative for color change and rash.   Musculoskeletal:+ for falls and myalgias - 1 in Nov 1 in Dec   Gastrointestinal: Negative for hematemesis and hematochezia.   Genitourinary: Negative for hematuria.   Neurological: +for dizziness and light-headedness.   Psychiatric/Behavioral: Negative for altered mental status and memory loss.          Objective:        Vitals:    12/16/24 0820   BP: 122/79   Pulse: 84       Lab Results   Component Value Date    WBC 12.52 12/09/2024    HGB 10.7 (L) 12/09/2024    HCT 35.3 (L) 12/09/2024     12/09/2024    CHOL 162 11/01/2024    TRIG 128 11/01/2024    HDL 48 11/01/2024    ALT 14 12/09/2024    AST 13 12/09/2024     (L) 12/09/2024    K 5.2 (H) 12/09/2024    CL 99 12/09/2024    CREATININE 1.2 12/09/2024    BUN 21 (H) 12/09/2024    CO2 26 12/09/2024    TSH 0.515 11/01/2024    PSA 0.24 03/10/2021    INR 1.0 11/01/2024    HGBA1C 5.8 11/02/2024    MICROALBUR 1.2 01/26/2022        ECHOCARDIOGRAM RESULTS  Results for orders placed during the hospital encounter of 11/17/23    Transesophageal echo (KATJA)    Interpretation Summary    Left Ventricle: There is normal systolic function with a visually estimated ejection fraction of 55 - 60%.    Right Ventricle: Normal right ventricular cavity size. Systolic function is normal.    Left Atrium: Agitated saline study of the atrial septum is negative, suggesting absence of intracardiac shunt at the atrial level. There appears to be lipomatous hypertrophy of the interatrial septum. Appendage velocity is normal at greater than 40 cm/sec. There is no thrombus in the left atrial appendage.    Aortic Valve: The aortic valve is a trileaflet valve.    Mitral Valve: There is mild regurgitation.    Tricuspid Valve: There  is mild regurgitation.    No definitive evidence of infective endocarditis on this study.  Consider repeating KATJA in a week if clinical suspicion still persists.        CURRENT/PREVIOUS VISIT EKG  Results for orders placed or performed during the hospital encounter of 11/01/24   ECG 12 lead    Collection Time: 11/27/24 11:34 AM   Result Value Ref Range    QRS Duration 74 ms    [facility] QTC Calculation 427 ms    Narrative    Test Reason : R29.818,    Vent. Rate :  76 BPM     Atrial Rate :  76 BPM     P-R Int : 120 ms          QRS Dur :  74 ms      QT Int : 380 ms       P-R-T Axes :  46  34  45 degrees    QTcB Int : 427 ms    Normal sinus rhythm  Normal ECG    Confirmed by Josh Mathis ([id number]) on 11/30/2024 7:49:43 PM    Referred By: AAAREFERRAL SELF           Confirmed By: Josh Mathis     No valid procedures specified.   Results for orders placed in visit on 02/01/24    Nuclear Stress Test    Interpretation Summary    The ECG portion of the study is negative for ischemia.    The patient reported no chest pain during the stress test.    There were no arrhythmias during stress.    The nuclear portion of this study will be reported separately.      Physical Exam:  CONSTITUTIONAL: No fever, no chills  HEENT: Normocephalic, atraumatic,pupils reactive to light                 NECK:  No JVD no carotid bruit  CVS: S1S2+, RRR, no murmurs,   LUNGS: Clear  ABDOMEN: Soft, NT, BS+  EXTREMITIES: No cyanosis, edema  : No joiner catheter  NEURO: AAO X 3  PSY: Normal affect      Medication List with Changes/Refills   Current Medications    BUDESONIDE (ENTOCORT EC) 3 MG CAPSULE    Take 3 capsules (9 mg total) by mouth once daily.    BUSPIRONE (BUSPAR) 15 MG TABLET    Take 1 tablet (15 mg total) by mouth 3 (three) times daily.    COLCHICINE (COLCRYS) 0.6 MG TABLET    Take 1 tablet (0.6 mg total) by mouth 2 (two) times daily.    ESCITALOPRAM OXALATE (LEXAPRO) 10 MG TABLET    Take 1 tablet (10 mg total) by mouth once daily.     HYOSCYAMINE (LEVBID) 0.375 MG TB12    Take 0.375 mg by mouth 2 (two) times daily.    LORAZEPAM (ATIVAN) 0.5 MG TABLET    Take 1 tablet (0.5 mg total) by mouth every 6 (six) hours as needed for Anxiety.    METOPROLOL SUCCINATE (TOPROL-XL) 50 MG 24 HR TABLET    Take 1 tablet (50 mg total) by mouth once daily.    PANTOPRAZOLE (PROTONIX) 40 MG TABLET    Take 40 mg by mouth 2 (two) times daily.    PROMETHAZINE (PHENERGAN) 25 MG TABLET    Take 25 mg by mouth 4 (four) times daily as needed for Nausea.    RIVAROXABAN (XARELTO) 20 MG TAB    Take 1 tablet (20 mg total) by mouth daily with dinner or evening meal.    SEMAGLUTIDE (OZEMPIC) 0.25 MG OR 0.5 MG (2 MG/3 ML) PEN INJECTOR    Inject 0.25 mg into the skin every 7 days.    SIMVASTATIN (ZOCOR) 20 MG TABLET    Take 1 tablet (20 mg total) by mouth every evening.    SODIUM ZIRCONIUM CYCLOSILICATE (LOKELMA) 5 GRAM PACKET    Take 1 packet (5 g total) by mouth 2 (two) times a day. Mix entire contents of packet(s) into drinking glass containing 3 tablespoons of water; stir well and drink immediately. Add water and repeat until no powder remains to receive entire dose.    ZOLPIDEM (AMBIEN) 10 MG TAB    Take 1 tablet (10 mg total) by mouth nightly as needed (insomnia).   Discontinued Medications    HYDROCODONE-ACETAMINOPHEN (NORCO)  MG PER TABLET    Take 1 tablet by mouth 4 (four) times daily.             Assessment:       1. SOB (shortness of breath)    2. Dizzy spells    3. Fatigue, unspecified type    4. Fall, initial encounter    5. Other chest pain    6. Morbid obesity    7. Dizziness    8. Deep vein thrombosis (DVT) of distal vein of lower extremity, unspecified chronicity, unspecified laterality    9. Anxiety    10. History of supraventricular tachycardia         Plan:     Problem List Items Addressed This Visit          Psychiatric    Anxiety    Current Assessment & Plan     Currently on BuSpar 15 mg t.i.d., Lexapro 10 mg daily and as needed Ativan 0.5 mg every 6  hours.  He is also taking Ambien 10 mg nightly to help with sleep.  Follows with psychiatry.  Continue the same.            Cardiac/Vascular    History of supraventricular tachycardia    Current Assessment & Plan     Continue with metoprolol succinate 50 mg daily.  EKG today in clinic shows normal sinus rhythm with a rate of 83.    Continue Mag oxide 400 mg daily.  Repeat 5 day Zio monitor.         Other chest pain    Current Assessment & Plan     Resolving with the use of colchicine.  Denies any chest pains at this time - stable.             Hematology    DVT (deep venous thrombosis)    Current Assessment & Plan     History noted.  Maintain on Xarelto.             Endocrine    Morbid obesity    Current Assessment & Plan     Body mass index is 49.93 kg/m². Morbid obesity complicates all aspects of disease management from diagnostic modalities to treatment. Weight loss encouraged and health benefits explained to patient.     Starting back on his ozempic.             Other    Dizziness    Current Assessment & Plan     Repeat 5 day cardiac Zio monitor, repeat ECHO.    Encouraged to:  Get up slowly from bed or after sitting for a long time. If you are in bed, roll to your side and swing your legs over the edge of the bed and onto the floor. Push your body up to a sitting position. Wait for a while before you slowly stand up.  Ensure you are staying adequately hydrated.  Choose water and other clear liquids.   Wear compression stockings to help improve blood flow.            Other Visit Diagnoses       SOB (shortness of breath)    -  Primary    Relevant Orders    IN OFFICE EKG 12-LEAD (to [facility])    Dizzy spells        Relevant Orders    IN OFFICE EKG 12-LEAD (to [facility])    Echo    Cardiac Monitor - 3-15 Day Adult ([facility] Only)    Fatigue, unspecified type        Relevant Orders    IN OFFICE EKG 12-LEAD (to [facility])    Echo    Cardiac Monitor - 3-15 Day Adult ([facility] Only)    Fall, initial encounter                Follow up in  about 3 months (around 3/16/2025).

## 2024-12-16 NOTE — ASSESSMENT & PLAN NOTE
Continue with metoprolol succinate 50 mg daily.  EKG today in clinic shows normal sinus rhythm with a rate of 83.    Continue Mag oxide 400 mg daily.  Repeat 5 day Zio monitor.   Not applicable

## 2024-12-16 NOTE — ASSESSMENT & PLAN NOTE
Body mass index is 49.93 kg/m². Morbid obesity complicates all aspects of disease management from diagnostic modalities to treatment. Weight loss encouraged and health benefits explained to patient.     Starting back on his ozempic.

## 2024-12-18 ENCOUNTER — OFFICE VISIT (OUTPATIENT)
Dept: WOUND CARE | Facility: HOSPITAL | Age: 49
End: 2024-12-18
Attending: FAMILY MEDICINE
Payer: COMMERCIAL

## 2024-12-18 ENCOUNTER — DOCUMENT SCAN (OUTPATIENT)
Dept: HOME HEALTH SERVICES | Facility: HOSPITAL | Age: 49
End: 2024-12-18
Payer: COMMERCIAL

## 2024-12-18 VITALS
TEMPERATURE: 98 F | HEART RATE: 86 BPM | RESPIRATION RATE: 16 BRPM | SYSTOLIC BLOOD PRESSURE: 142 MMHG | DIASTOLIC BLOOD PRESSURE: 85 MMHG

## 2024-12-18 DIAGNOSIS — S41.102D OPEN WOUND OF LEFT UPPER ARM, SUBSEQUENT ENCOUNTER: Primary | ICD-10-CM

## 2024-12-18 DIAGNOSIS — L02.818 CUTANEOUS ABSCESS OF OTHER SITE: ICD-10-CM

## 2024-12-18 LAB
OHS QRS DURATION: 74 MS
OHS QTC CALCULATION: 451 MS

## 2024-12-18 PROCEDURE — 3066F NEPHROPATHY DOC TX: CPT | Mod: CPTII,,, | Performed by: FAMILY MEDICINE

## 2024-12-18 PROCEDURE — 99499 UNLISTED E&M SERVICE: CPT | Mod: ,,, | Performed by: FAMILY MEDICINE

## 2024-12-18 PROCEDURE — 1160F RVW MEDS BY RX/DR IN RCRD: CPT | Mod: CPTII,,, | Performed by: FAMILY MEDICINE

## 2024-12-18 PROCEDURE — 1159F MED LIST DOCD IN RCRD: CPT | Mod: CPTII,,, | Performed by: FAMILY MEDICINE

## 2024-12-18 PROCEDURE — 1111F DSCHRG MED/CURRENT MED MERGE: CPT | Mod: CPTII,,, | Performed by: FAMILY MEDICINE

## 2024-12-18 PROCEDURE — 99213 OFFICE O/P EST LOW 20 MIN: CPT | Mod: PN | Performed by: FAMILY MEDICINE

## 2024-12-18 PROCEDURE — 3079F DIAST BP 80-89 MM HG: CPT | Mod: CPTII,,, | Performed by: FAMILY MEDICINE

## 2024-12-18 PROCEDURE — 3044F HG A1C LEVEL LT 7.0%: CPT | Mod: CPTII,,, | Performed by: FAMILY MEDICINE

## 2024-12-18 PROCEDURE — 99213 OFFICE O/P EST LOW 20 MIN: CPT | Mod: ,,, | Performed by: FAMILY MEDICINE

## 2024-12-18 PROCEDURE — 3061F NEG MICROALBUMINURIA REV: CPT | Mod: CPTII,,, | Performed by: FAMILY MEDICINE

## 2024-12-18 PROCEDURE — 3077F SYST BP >= 140 MM HG: CPT | Mod: CPTII,,, | Performed by: FAMILY MEDICINE

## 2024-12-18 NOTE — PROGRESS NOTES
"          Wound Care Progress Note    Subjective:       Patient ID: Jacoby Jean-Baptiste is a 49 y.o. male.    Chief Complaint: Wound Care    HPI  Pt seen in clinic for a FU visit for a left arm open surgical wound. Wound is showing some improvement, pt has no new complaints today  Review of Systems   Skin:  Positive for wound.        Open wound left arm   All other systems reviewed and are negative.      Objective:        Physical Exam  Vitals and nursing note reviewed.   Skin:     General: Skin is warm and dry.      Findings: Lesion present.      Comments: Left arm surgical wound, see wound care assessment documentation in chart review scanned under the media tab   Neurological:      General: No focal deficit present.      Mental Status: He is alert.   Psychiatric:         Mood and Affect: Mood normal.         Judgment: Judgment normal.       Vitals:    12/18/24 0934   BP: (!) 142/85   Pulse: 86   Resp: 16   Temp: 98.2 °F (36.8 °C)       Assessment:           ICD-10-CM ICD-9-CM   1. Open wound of left upper arm, subsequent encounter  S41.102D V58.89     880.03   2. Cutaneous abscess of other site  L02.818 682.8                Plan:                  Jacoby Waters" was seen today for wound care.    Diagnoses and all orders for this visit:    Open wound of left upper arm, subsequent encounter    Cutaneous abscess of other site        Much of the documentation for this visit was completed in the W[facility] Docs system.  Please see the attached documentation for further details about the patient's care. Scanned under the Media tab.          "

## 2024-12-22 ENCOUNTER — HOSPITAL ENCOUNTER (INPATIENT)
Facility: HOSPITAL | Age: 49
LOS: 2 days | Discharge: HOME | DRG: 074 | End: 2024-12-24
Attending: EMERGENCY MEDICINE | Admitting: INTERNAL MEDICINE
Payer: COMMERCIAL

## 2024-12-22 DIAGNOSIS — E11.9 TYPE 2 DIABETES MELLITUS WITHOUT COMPLICATION, WITHOUT LONG-TERM CURRENT USE OF INSULIN: ICD-10-CM

## 2024-12-22 DIAGNOSIS — R11.10 VOMITING: ICD-10-CM

## 2024-12-22 DIAGNOSIS — R07.9 CHEST PAIN: ICD-10-CM

## 2024-12-22 DIAGNOSIS — R11.0 NAUSEA: ICD-10-CM

## 2024-12-22 DIAGNOSIS — K51.011 ULCERATIVE PANCOLITIS WITH RECTAL BLEEDING: Primary | ICD-10-CM

## 2024-12-22 LAB
ABO + RH BLD: NORMAL
ALBUMIN SERPL BCP-MCNC: 4.5 G/DL (ref 3.5–5.2)
ALP SERPL-CCNC: 88 U/L (ref 55–135)
ALT SERPL W/O P-5'-P-CCNC: 16 U/L (ref 10–44)
ANION GAP SERPL CALC-SCNC: 12 MMOL/L (ref 8–16)
AST SERPL-CCNC: 19 U/L (ref 10–40)
BASOPHILS # BLD AUTO: 0.03 K/UL (ref 0–0.2)
BASOPHILS NFR BLD: 0.2 % (ref 0–1.9)
BILIRUB SERPL-MCNC: 0.6 MG/DL (ref 0.1–1)
BLD GP AB SCN CELLS X3 SERPL QL: NORMAL
BUN SERPL-MCNC: 10 MG/DL (ref 6–20)
CALCIUM SERPL-MCNC: 10.4 MG/DL (ref 8.7–10.5)
CHLORIDE SERPL-SCNC: 99 MMOL/L (ref 95–110)
CO2 SERPL-SCNC: 25 MMOL/L (ref 23–29)
CREAT SERPL-MCNC: 1.1 MG/DL (ref 0.5–1.4)
CRP SERPL-MCNC: 2.1 MG/DL
DIFFERENTIAL METHOD BLD: ABNORMAL
EOSINOPHIL # BLD AUTO: 0 K/UL (ref 0–0.5)
EOSINOPHIL NFR BLD: 0.2 % (ref 0–8)
ERYTHROCYTE [DISTWIDTH] IN BLOOD BY AUTOMATED COUNT: 17.4 % (ref 11.5–14.5)
ERYTHROCYTE [SEDIMENTATION RATE] IN BLOOD BY WESTERGREN METHOD: 40 MM/HR (ref 0–10)
EST. GFR  (NO RACE VARIABLE): >60 ML/MIN/1.73 M^2
GLUCOSE SERPL-MCNC: 111 MG/DL (ref 70–110)
HCT VFR BLD AUTO: 37.9 % (ref 40–54)
HGB BLD-MCNC: 11.9 G/DL (ref 14–18)
IMM GRANULOCYTES # BLD AUTO: 0.09 K/UL (ref 0–0.04)
IMM GRANULOCYTES NFR BLD AUTO: 0.5 % (ref 0–0.5)
LIPASE SERPL-CCNC: 4 U/L (ref 4–60)
LYMPHOCYTES # BLD AUTO: 0.9 K/UL (ref 1–4.8)
LYMPHOCYTES NFR BLD: 5.4 % (ref 18–48)
MCH RBC QN AUTO: 27 PG (ref 27–31)
MCHC RBC AUTO-ENTMCNC: 31.4 G/DL (ref 32–36)
MCV RBC AUTO: 86 FL (ref 82–98)
MONOCYTES # BLD AUTO: 0.8 K/UL (ref 0.3–1)
MONOCYTES NFR BLD: 4.6 % (ref 4–15)
NEUTROPHILS # BLD AUTO: 15.6 K/UL (ref 1.8–7.7)
NEUTROPHILS NFR BLD: 89.1 % (ref 38–73)
NRBC BLD-RTO: 0 /100 WBC
PLATELET # BLD AUTO: 304 K/UL (ref 150–450)
PMV BLD AUTO: 10.8 FL (ref 9.2–12.9)
POTASSIUM SERPL-SCNC: 4.6 MMOL/L (ref 3.5–5.1)
PROT SERPL-MCNC: 8.3 G/DL (ref 6–8.4)
RBC # BLD AUTO: 4.4 M/UL (ref 4.6–6.2)
SODIUM SERPL-SCNC: 136 MMOL/L (ref 136–145)
SPECIMEN OUTDATE: NORMAL
WBC # BLD AUTO: 17.46 K/UL (ref 3.9–12.7)

## 2024-12-22 PROCEDURE — 96361 HYDRATE IV INFUSION ADD-ON: CPT

## 2024-12-22 PROCEDURE — 86901 BLOOD TYPING SEROLOGIC RH(D): CPT | Performed by: EMERGENCY MEDICINE

## 2024-12-22 PROCEDURE — 25000003 PHARM REV CODE 250

## 2024-12-22 PROCEDURE — 96374 THER/PROPH/DIAG INJ IV PUSH: CPT

## 2024-12-22 PROCEDURE — 80053 COMPREHEN METABOLIC PANEL: CPT | Performed by: EMERGENCY MEDICINE

## 2024-12-22 PROCEDURE — 83690 ASSAY OF LIPASE: CPT | Performed by: EMERGENCY MEDICINE

## 2024-12-22 PROCEDURE — 86140 C-REACTIVE PROTEIN: CPT

## 2024-12-22 PROCEDURE — 25000003 PHARM REV CODE 250: Performed by: EMERGENCY MEDICINE

## 2024-12-22 PROCEDURE — 63600175 PHARM REV CODE 636 W HCPCS: Performed by: EMERGENCY MEDICINE

## 2024-12-22 PROCEDURE — 85651 RBC SED RATE NONAUTOMATED: CPT

## 2024-12-22 PROCEDURE — 12000002 HC ACUTE/MED SURGE SEMI-PRIVATE ROOM

## 2024-12-22 PROCEDURE — 93010 ELECTROCARDIOGRAM REPORT: CPT | Mod: ,,, | Performed by: INTERNAL MEDICINE

## 2024-12-22 PROCEDURE — 99285 EMERGENCY DEPT VISIT HI MDM: CPT | Mod: 25

## 2024-12-22 PROCEDURE — 25500020 PHARM REV CODE 255: Performed by: EMERGENCY MEDICINE

## 2024-12-22 PROCEDURE — 63600175 PHARM REV CODE 636 W HCPCS

## 2024-12-22 PROCEDURE — 85025 COMPLETE CBC W/AUTO DIFF WBC: CPT | Performed by: EMERGENCY MEDICINE

## 2024-12-22 PROCEDURE — 93005 ELECTROCARDIOGRAM TRACING: CPT | Performed by: INTERNAL MEDICINE

## 2024-12-22 PROCEDURE — 36415 COLL VENOUS BLD VENIPUNCTURE: CPT

## 2024-12-22 PROCEDURE — 96375 TX/PRO/DX INJ NEW DRUG ADDON: CPT

## 2024-12-22 RX ORDER — AMOXICILLIN 250 MG
1 CAPSULE ORAL DAILY PRN
Status: DISCONTINUED | OUTPATIENT
Start: 2024-12-22 | End: 2024-12-24 | Stop reason: HOSPADM

## 2024-12-22 RX ORDER — MORPHINE SULFATE 4 MG/ML
8 INJECTION, SOLUTION INTRAMUSCULAR; INTRAVENOUS
Status: COMPLETED | OUTPATIENT
Start: 2024-12-22 | End: 2024-12-22

## 2024-12-22 RX ORDER — METOPROLOL SUCCINATE 50 MG/1
50 TABLET, EXTENDED RELEASE ORAL DAILY
Status: DISCONTINUED | OUTPATIENT
Start: 2024-12-22 | End: 2024-12-24 | Stop reason: HOSPADM

## 2024-12-22 RX ORDER — GLUCAGON 1 MG
1 KIT INJECTION
Status: DISCONTINUED | OUTPATIENT
Start: 2024-12-22 | End: 2024-12-24 | Stop reason: HOSPADM

## 2024-12-22 RX ORDER — SODIUM CHLORIDE 0.9 % (FLUSH) 0.9 %
2 SYRINGE (ML) INJECTION EVERY 12 HOURS PRN
Status: DISCONTINUED | OUTPATIENT
Start: 2024-12-22 | End: 2024-12-24 | Stop reason: HOSPADM

## 2024-12-22 RX ORDER — ONDANSETRON HYDROCHLORIDE 2 MG/ML
4 INJECTION, SOLUTION INTRAVENOUS EVERY 6 HOURS PRN
Status: DISCONTINUED | OUTPATIENT
Start: 2024-12-22 | End: 2024-12-24 | Stop reason: HOSPADM

## 2024-12-22 RX ORDER — PROMETHAZINE HYDROCHLORIDE 25 MG/1
25 TABLET ORAL 4 TIMES DAILY PRN
Status: DISCONTINUED | OUTPATIENT
Start: 2024-12-22 | End: 2024-12-24 | Stop reason: HOSPADM

## 2024-12-22 RX ORDER — IBUPROFEN 200 MG
24 TABLET ORAL
Status: DISCONTINUED | OUTPATIENT
Start: 2024-12-22 | End: 2024-12-24 | Stop reason: HOSPADM

## 2024-12-22 RX ORDER — NALOXONE HCL 0.4 MG/ML
0.02 VIAL (ML) INJECTION
Status: DISCONTINUED | OUTPATIENT
Start: 2024-12-22 | End: 2024-12-24 | Stop reason: HOSPADM

## 2024-12-22 RX ORDER — LORAZEPAM 0.5 MG/1
0.5 TABLET ORAL EVERY 6 HOURS PRN
Status: DISCONTINUED | OUTPATIENT
Start: 2024-12-22 | End: 2024-12-24 | Stop reason: HOSPADM

## 2024-12-22 RX ORDER — ATORVASTATIN CALCIUM 40 MG/1
40 TABLET, FILM COATED ORAL NIGHTLY
Status: DISCONTINUED | OUTPATIENT
Start: 2024-12-22 | End: 2024-12-24 | Stop reason: HOSPADM

## 2024-12-22 RX ORDER — LANOLIN ALCOHOL/MO/W.PET/CERES
800 CREAM (GRAM) TOPICAL
Status: DISCONTINUED | OUTPATIENT
Start: 2024-12-22 | End: 2024-12-24 | Stop reason: HOSPADM

## 2024-12-22 RX ORDER — ENOXAPARIN SODIUM 100 MG/ML
40 INJECTION SUBCUTANEOUS EVERY 12 HOURS
Status: DISCONTINUED | OUTPATIENT
Start: 2024-12-22 | End: 2024-12-22

## 2024-12-22 RX ORDER — SODIUM,POTASSIUM PHOSPHATES 280-250MG
2 POWDER IN PACKET (EA) ORAL
Status: DISCONTINUED | OUTPATIENT
Start: 2024-12-22 | End: 2024-12-24 | Stop reason: HOSPADM

## 2024-12-22 RX ORDER — MORPHINE SULFATE 4 MG/ML
4 INJECTION, SOLUTION INTRAMUSCULAR; INTRAVENOUS EVERY 4 HOURS PRN
Status: DISCONTINUED | OUTPATIENT
Start: 2024-12-22 | End: 2024-12-23

## 2024-12-22 RX ORDER — ZOLPIDEM TARTRATE 5 MG/1
10 TABLET ORAL NIGHTLY PRN
Status: DISCONTINUED | OUTPATIENT
Start: 2024-12-22 | End: 2024-12-24 | Stop reason: HOSPADM

## 2024-12-22 RX ORDER — IBUPROFEN 200 MG
16 TABLET ORAL
Status: DISCONTINUED | OUTPATIENT
Start: 2024-12-22 | End: 2024-12-24 | Stop reason: HOSPADM

## 2024-12-22 RX ORDER — HYDROCODONE BITARTRATE AND ACETAMINOPHEN 5; 325 MG/1; MG/1
1 TABLET ORAL
Status: COMPLETED | OUTPATIENT
Start: 2024-12-22 | End: 2024-12-22

## 2024-12-22 RX ORDER — BUSPIRONE HYDROCHLORIDE 5 MG/1
15 TABLET ORAL 3 TIMES DAILY
Status: DISCONTINUED | OUTPATIENT
Start: 2024-12-22 | End: 2024-12-24 | Stop reason: HOSPADM

## 2024-12-22 RX ORDER — ESCITALOPRAM OXALATE 10 MG/1
10 TABLET ORAL DAILY
Status: DISCONTINUED | OUTPATIENT
Start: 2024-12-22 | End: 2024-12-24 | Stop reason: HOSPADM

## 2024-12-22 RX ORDER — ACETAMINOPHEN 325 MG/1
650 TABLET ORAL EVERY 8 HOURS PRN
Status: DISCONTINUED | OUTPATIENT
Start: 2024-12-22 | End: 2024-12-24 | Stop reason: HOSPADM

## 2024-12-22 RX ORDER — ALUMINUM HYDROXIDE, MAGNESIUM HYDROXIDE, AND SIMETHICONE 1200; 120; 1200 MG/30ML; MG/30ML; MG/30ML
30 SUSPENSION ORAL 4 TIMES DAILY PRN
Status: DISCONTINUED | OUTPATIENT
Start: 2024-12-22 | End: 2024-12-24 | Stop reason: HOSPADM

## 2024-12-22 RX ORDER — METHYLPREDNISOLONE SOD SUCC 125 MG
125 VIAL (EA) INJECTION
Status: COMPLETED | OUTPATIENT
Start: 2024-12-22 | End: 2024-12-22

## 2024-12-22 RX ORDER — HYDROCODONE BITARTRATE AND ACETAMINOPHEN 5; 325 MG/1; MG/1
1 TABLET ORAL EVERY 6 HOURS PRN
Status: DISCONTINUED | OUTPATIENT
Start: 2024-12-22 | End: 2024-12-23

## 2024-12-22 RX ORDER — TALC
6 POWDER (GRAM) TOPICAL NIGHTLY PRN
Status: DISCONTINUED | OUTPATIENT
Start: 2024-12-22 | End: 2024-12-24 | Stop reason: HOSPADM

## 2024-12-22 RX ORDER — ACETAMINOPHEN 325 MG/1
650 TABLET ORAL EVERY 4 HOURS PRN
Status: DISCONTINUED | OUTPATIENT
Start: 2024-12-22 | End: 2024-12-24 | Stop reason: HOSPADM

## 2024-12-22 RX ORDER — ONDANSETRON HYDROCHLORIDE 2 MG/ML
4 INJECTION, SOLUTION INTRAVENOUS
Status: COMPLETED | OUTPATIENT
Start: 2024-12-22 | End: 2024-12-22

## 2024-12-22 RX ORDER — PANTOPRAZOLE SODIUM 40 MG/1
40 TABLET, DELAYED RELEASE ORAL 2 TIMES DAILY
Status: DISCONTINUED | OUTPATIENT
Start: 2024-12-22 | End: 2024-12-24 | Stop reason: HOSPADM

## 2024-12-22 RX ADMIN — Medication 6 MG: at 10:12

## 2024-12-22 RX ADMIN — HYDROCODONE BITARTRATE AND ACETAMINOPHEN 1 TABLET: 5; 325 TABLET ORAL at 06:12

## 2024-12-22 RX ADMIN — SODIUM CHLORIDE 1000 ML: 9 INJECTION, SOLUTION INTRAVENOUS at 03:12

## 2024-12-22 RX ADMIN — ATORVASTATIN CALCIUM 40 MG: 40 TABLET, FILM COATED ORAL at 09:12

## 2024-12-22 RX ADMIN — MORPHINE SULFATE 4 MG: 4 INJECTION, SOLUTION INTRAMUSCULAR; INTRAVENOUS at 11:12

## 2024-12-22 RX ADMIN — ESCITALOPRAM OXALATE 10 MG: 10 TABLET ORAL at 10:12

## 2024-12-22 RX ADMIN — METOPROLOL SUCCINATE 50 MG: 50 TABLET, FILM COATED, EXTENDED RELEASE ORAL at 10:12

## 2024-12-22 RX ADMIN — RIVAROXABAN 20 MG: 20 TABLET, FILM COATED ORAL at 05:12

## 2024-12-22 RX ADMIN — HYDROCODONE BITARTRATE AND ACETAMINOPHEN 1 TABLET: 5; 325 TABLET ORAL at 09:12

## 2024-12-22 RX ADMIN — ONDANSETRON 4 MG: 2 INJECTION INTRAMUSCULAR; INTRAVENOUS at 03:12

## 2024-12-22 RX ADMIN — IOHEXOL 100 ML: 350 INJECTION, SOLUTION INTRAVENOUS at 04:12

## 2024-12-22 RX ADMIN — ONDANSETRON 4 MG: 2 INJECTION INTRAMUSCULAR; INTRAVENOUS at 07:12

## 2024-12-22 RX ADMIN — METHYLPREDNISOLONE SODIUM SUCCINATE 125 MG: 125 INJECTION, POWDER, FOR SOLUTION INTRAMUSCULAR; INTRAVENOUS at 06:12

## 2024-12-22 RX ADMIN — BUSPIRONE HYDROCHLORIDE 15 MG: 5 TABLET ORAL at 09:12

## 2024-12-22 RX ADMIN — ACETAMINOPHEN 650 MG: 325 TABLET ORAL at 10:12

## 2024-12-22 RX ADMIN — PANTOPRAZOLE SODIUM 40 MG: 40 TABLET, DELAYED RELEASE ORAL at 05:12

## 2024-12-22 RX ADMIN — ACETAMINOPHEN 650 MG: 325 TABLET ORAL at 05:12

## 2024-12-22 RX ADMIN — PANTOPRAZOLE SODIUM 40 MG: 40 TABLET, DELAYED RELEASE ORAL at 10:12

## 2024-12-22 RX ADMIN — ZOLPIDEM TARTRATE 10 MG: 5 TABLET, COATED ORAL at 09:12

## 2024-12-22 RX ADMIN — ONDANSETRON 4 MG: 2 INJECTION INTRAMUSCULAR; INTRAVENOUS at 10:12

## 2024-12-22 RX ADMIN — BUSPIRONE HYDROCHLORIDE 15 MG: 5 TABLET ORAL at 02:12

## 2024-12-22 RX ADMIN — MORPHINE SULFATE 4 MG: 4 INJECTION, SOLUTION INTRAMUSCULAR; INTRAVENOUS at 03:12

## 2024-12-22 RX ADMIN — MORPHINE SULFATE 8 MG: 4 INJECTION, SOLUTION INTRAMUSCULAR; INTRAVENOUS at 03:12

## 2024-12-22 NOTE — ASSESSMENT & PLAN NOTE
Does not seem to be in flare, GI consult in abundance of caution since his symptoms were felt to be significant enough for admission, does endorse hematochezia but stable Hb, CT consistent with colitis--clinically chronic although was noted to have interval worsening per radiologist    Needs better expectations set with recurrent admissions, given IV steroids in ED, will not continue unless recommended by GI    ESR, CRP and fecal calprotectin ordered

## 2024-12-22 NOTE — HPI
This is a 49 year gentleman history ulcerative and numerous admissions for the same presents with complaints of nausea nonbloody nonbilious emesis, abdominal pain and hematochezia.  Patient is noted to have moderate leukocytosis and CT imaging consistent with colitis.  Gastroenterology team was consulted for guidance in this patient with recurrent admissions.  On assessment he is calm, cooperative and in no acute distress.  He is hemodynamically stable and complains of abdominal pain.  He reports improvement of his nausea.  He does endorse ongoing hematochezia.  He is afebrile, denies malaise, body aches or upper respiratory symptoms.

## 2024-12-22 NOTE — PLAN OF CARE
Problem: Pain Acute  Goal: Optimal Pain Control and Function  Outcome: Progressing  Intervention: Develop Pain Management Plan  Flowsheets (Taken 12/22/2024 1708)  Pain Management Interventions:   care clustered   medication offered   pain management plan reviewed with patient/caregiver   quiet environment facilitated   pillow support provided   relaxation techniques promoted

## 2024-12-22 NOTE — CONSULTS
"GASTROENTEROLOGY INPATIENT CONSULT NOTE  Patient Name: Jacoby Jean-Baptiste  Patient MRN: [id number]  Patient : 1975    Admit Date: 2024  Service date: 2024    Reason for Consult: ulcerative colitis    PCP: Hunter Aguilar III, MD    Chief Complaint   Patient presents with    Abdominal Pain    Weakness    Vomiting     With BRB in stool and consitpated, x3-4days but worse today.        HPI: Patient is a 49 y.o. male with PMHx  severe UC admitted with nausea vomiting. Stool formed.  On ozempic, xarelto, tremfya.   Some blood with wiping.  Reports chronic nausea and vomiting clear liquids. H/o CCY.  Last EGD  VTD reports "Suspect gastroparesis due to absence of peristalsis. "    Past Medical History:  Past Medical History:   Diagnosis Date    C. difficile colitis     Cavitary pneumonia 2023    pos aspergillus serology    Diabetes mellitus 2022    Hyperlipidemia 2015    Hypertension 2015    Insomnia 2015    Ulcerative colitis         Past Surgical History:  Past Surgical History:   Procedure Laterality Date    BRONCHOSCOPY WITH FLUOROSCOPY Left 2023    Procedure: BRONCHOSCOPY, WITH FLUOROSCOPY;  Surgeon: Lisa Villarreal MD;  Location: [facility];  Service: Pulmonary;  Laterality: Left;    BRONCHOSCOPY WITH FLUOROSCOPY N/A 2023    Procedure: BRONCHOSCOPY, WITH FLUOROSCOPY;  Surgeon: Lisa Villarreal MD;  Location: [facility];  Service: Pulmonary;  Laterality: N/A;    CARDIAC ELECTROPHYSIOLOGY MAPPING AND ABLATION      SVT    CHOLECYSTECTOMY      COLONOSCOPY N/A 5/3/2022    Procedure: COLONOSCOPY;  Surgeon: Shan Jean III, MD;  Location: [facility];  Service: Endoscopy;  Laterality: N/A;    COLONOSCOPY N/A 3/16/2024    Procedure: COLONOSCOPY;  Surgeon: ALEKSANDER Ramos MD;  Location: [facility];  Service: Endoscopy;  Laterality: N/A;    COLONOSCOPY WITH FECAL MICROBIOTA TRANSFER N/A 3/21/2023    Procedure: COLONOSCOPY, WITH FECAL MICROBIOTA TRANSFER;  Surgeon: " David Millan MD;  Location: [facility] ENDO;  Service: Endoscopy;  Laterality: N/A;    ESOPHAGOGASTRODUODENOSCOPY N/A 4/24/2023    Procedure: EGD (ESOPHAGOGASTRODUODENOSCOPY);  Surgeon: Shan Jean III, MD;  Location: [facility] ENDO;  Service: Endoscopy;  Laterality: N/A;    ESOPHAGOGASTRODUODENOSCOPY N/A 6/5/2024    Procedure: EGD (ESOPHAGOGASTRODUODENOSCOPY);  Surgeon: Odalis Mccabe MD;  Location: [facility] ENDO;  Service: Endoscopy;  Laterality: N/A;    FLEXIBLE SIGMOIDOSCOPY N/A 6/5/2024    Procedure: SIGMOIDOSCOPY, FLEXIBLE;  Surgeon: Odalis Mccabe MD;  Location: [facility] ENDO;  Service: Endoscopy;  Laterality: N/A;    FLEXIBLE SIGMOIDOSCOPY N/A 7/16/2024    Procedure: SIGMOIDOSCOPY, FLEXIBLE;  Surgeon: Shan Jean III, MD;  Location: [facility] ENDO;  Service: Endoscopy;  Laterality: N/A;    FLEXIBLE SIGMOIDOSCOPY N/A 8/13/2024    Procedure: SIGMOIDOSCOPY, FLEXIBLE;  Surgeon: Shan Jean III, MD;  Location: [facility] ENDO;  Service: Endoscopy;  Laterality: N/A;    GANGLION CYST EXCISION Left 1992    UMBILICAL HERNIA REPAIR  2011    with mesh        Home Medications:  Medications Prior to Admission   Medication Sig Dispense Refill Last Dose/Taking    budesonide (ENTOCORT EC) 3 mg capsule Take 3 capsules (9 mg total) by mouth once daily. 180 each 0 12/21/2024 Morning    busPIRone (BUSPAR) 15 MG tablet Take 1 tablet (15 mg total) by mouth 3 (three) times daily. 90 tablet 5 12/21/2024 Noon    colchicine (COLCRYS) 0.6 mg tablet Take 1 tablet (0.6 mg total) by mouth 2 (two) times daily. 180 tablet 0 12/21/2024 Morning    EScitalopram oxalate (LEXAPRO) 10 MG tablet Take 1 tablet (10 mg total) by mouth once daily. 30 tablet 2 12/21/2024 Morning    hyoscyamine (LEVBID) 0.375 mg Tb12 Take 0.375 mg by mouth 2 (two) times daily.   12/21/2024 Morning    LORazepam (ATIVAN) 0.5 MG tablet Take 1 tablet (0.5 mg total) by mouth every 6 (six) hours as needed for Anxiety. 90 tablet 5 12/21/2024 Morning    metoprolol  succinate (TOPROL-XL) 50 MG 24 hr tablet Take 1 tablet (50 mg total) by mouth once daily. 90 tablet 1 12/21/2024 Morning    pantoprazole (PROTONIX) 40 MG tablet Take 40 mg by mouth 2 (two) times daily.   12/21/2024 Morning    promethazine (PHENERGAN) 25 MG tablet Take 25 mg by mouth 4 (four) times daily as needed for Nausea.   12/21/2024 Noon    rivaroxaban (XARELTO) 20 mg Tab Take 1 tablet (20 mg total) by mouth daily with dinner or evening meal. 90 tablet 0 12/20/2024    semaglutide (OZEMPIC) 0.25 mg or 0.5 mg (2 mg/3 mL) pen injector Inject 0.25 mg into the skin every 7 days. (Patient taking differently: Inject 0.25 mg into the skin every 7 days. Wednesdays) 3 mL 0 12/18/2024    simvastatin (ZOCOR) 20 MG tablet Take 1 tablet (20 mg total) by mouth every evening. 90 tablet 1 12/20/2024    zolpidem (AMBIEN) 10 mg Tab Take 1 tablet (10 mg total) by mouth nightly as needed (insomnia). 30 tablet 2 12/20/2024    sodium zirconium cyclosilicate (LOKELMA) 5 gram packet Take 1 packet (5 g total) by mouth 2 (two) times a day. Mix entire contents of packet(s) into drinking glass containing 3 tablespoons of water; stir well and drink immediately. Add water and repeat until no powder remains to receive entire dose. (Patient not taking: Reported on 12/22/2024) 30 packet 0 Not Taking       Inpatient Medications:   atorvastatin  40 mg Oral QHS    busPIRone  15 mg Oral TID    EScitalopram oxalate  10 mg Oral Daily    metoprolol succinate  50 mg Oral Daily    pantoprazole  40 mg Oral BID    rivaroxaban  20 mg Oral Daily with dinner       Current Facility-Administered Medications:     acetaminophen, 650 mg, Oral, Q8H PRN    acetaminophen, 650 mg, Oral, Q4H PRN    aluminum-magnesium hydroxide-simethicone, 30 mL, Oral, QID PRN    dextrose 50%, 12.5 g, Intravenous, PRN    dextrose 50%, 25 g, Intravenous, PRN    glucagon (human recombinant), 1 mg, Intramuscular, PRN    glucose, 16 g, Oral, PRN    glucose, 24 g, Oral, PRN     HYDROcodone-acetaminophen, 1 tablet, Oral, Q6H PRN    LORazepam, 0.5 mg, Oral, Q6H PRN    magnesium oxide, 800 mg, Oral, PRN    magnesium oxide, 800 mg, Oral, PRN    melatonin, 6 mg, Oral, Nightly PRN    naloxone, 0.02 mg, Intravenous, PRN    ondansetron, 4 mg, Intravenous, Q6H PRN    potassium bicarbonate, 35 mEq, Oral, PRN    potassium bicarbonate, 50 mEq, Oral, PRN    potassium bicarbonate, 60 mEq, Oral, PRN    potassium, sodium phosphates, 2 packet, Oral, PRN    potassium, sodium phosphates, 2 packet, Oral, PRN    potassium, sodium phosphates, 2 packet, Oral, PRN    promethazine, 25 mg, Oral, QID PRN    senna-docusate 8.6-50 mg, 1 tablet, Oral, Daily PRN    sodium chloride 0.9%, 2 mL, Intravenous, Q12H PRN    zolpidem, 10 mg, Oral, Nightly PRN    Review of patient's allergies indicates:  No Known Allergies    Social History:   Social History     Occupational History    Not on file   Tobacco Use    Smoking status: Former     Average packs/day: 1 pack/day for 30.0 years (30.0 ttl pk-yrs)     Types: Cigarettes     Start date: 1993    Smokeless tobacco: Never    Tobacco comments:     Pt states he has stopped smoking with Chantix three weeks ago. 12/1/23   Substance and Sexual Activity    Alcohol use: Yes     Comment: ocass    Drug use: Not Currently    Sexual activity: Not Currently       Family History:   Family History   Problem Relation Name Age of Onset    Asthma Mother         Review of Systems:  A 10 point review of systems was performed and was normal, except as mentioned in the HPI, including constitutional, HEENT, heme, lymph, cardiovascular, respiratory, gastrointestinal, genitourinary, neurologic, endocrine, psychiatric and musculoskeletal.      OBJECTIVE:    Physical Exam:  24 Hour Vital Sign Ranges: Temp:  [98.2 °F (36.8 °C)] 98.2 °F (36.8 °C)  Pulse:  [] 88  Resp:  [14-20] 20  SpO2:  [95 %-99 %] 95 %  BP: (128-177)/() 153/100  Most recent vitals: BP (!) 153/100   Pulse 88   Temp 98.2 °F  "(36.8 °C) (Oral)   Resp 20   Ht 5' 5" (1.651 m)   Wt 136.1 kg (300 lb)   SpO2 95%   BMI 49.92 kg/m²    GEN: well-developed, well-nourished, awake and alert, non-toxic appearing adult  HEENT: PERRL, sclera anicteric, oral mucosa pink and moist without lesion  NECK: trachea midline; Good ROM  CV: regular rate and rhythm, no murmurs or gallops  RESP: clear to auscultation bilaterally, no wheezes, rhonci or rales  ABD: soft, non-tender, non-distended, normal bowel sounds  EXT: no swelling or edema, 2+ pulses distally  SKIN: no rashes or jaundice  PSYCH: normal affect    Labs:   Recent Labs     12/22/24 0321   WBC 17.46*   MCV 86        Recent Labs     12/22/24 0321      K 4.6   CL 99   CO2 25   BUN 10   *     No results for input(s): "ALB" in the last 72 hours.    Invalid input(s): "ALKP", "SGOT", "SGPT", "TBIL", "DBIL", "TPRO"  No results for input(s): "PT", "INR", "PTT" in the last 72 hours.      Radiology Review:  CT Abdomen Pelvis With IV Contrast NO Oral Contrast   Final Result   Abnormal      Findings referable to the colon consistent with colitis, involving the mid to distal transverse colon and descending colon extending to the level of the sigmoid colon, with progression compared to the prior examination, as above.      Additional findings as above.      This report was flagged in Epic as abnormal.         Electronically signed by: Fernando Fenton   Date:    12/22/2024   Time:    05:20            IMPRESSION / RECOMMENDATIONS:  Nausea vomiting  -off ozempic  -supportive care  -Dr Burton will see tomorrow    UC  -clinically improved  -imaging worse  -defer to dr burton. Will need repeat sigmoidoscopy in near future    Thank you for this consult.    Enoc Kebede  12/22/2024  11:18 AM        "

## 2024-12-22 NOTE — H&P
[facility] Medicine  History & Physical    Patient Name: Jacoby Jean-Baptiste  MRN: [id number]  Patient Class: IP- Inpatient  Admission Date: 12/22/2024  Attending Physician: Sofie Prasad MD   Primary Care Provider: Hunter Aguilar III, MD         Patient information was obtained from patient and ER records.     Subjective:     Principal Problem:Nausea    Chief Complaint:   Chief Complaint   Patient presents with    Abdominal Pain    Weakness    Vomiting     With BRB in stool and consitpated, x3-4days but worse today.         HPI: This is a 49 year gentleman history ulcerative and numerous admissions for the same presents with complaints of nausea nonbloody nonbilious emesis, abdominal pain and hematochezia.  Patient is noted to have moderate leukocytosis and CT imaging consistent with colitis.  Gastroenterology team was consulted for guidance in this patient with recurrent admissions.  On assessment he is calm, cooperative and in no acute distress.  He is hemodynamically stable and complains of abdominal pain.  He reports improvement of his nausea.  He does endorse ongoing hematochezia.  He is afebrile, denies malaise, body aches or upper respiratory symptoms.    Past Medical History:   Diagnosis Date    C. difficile colitis     Cavitary pneumonia 11/2023    pos aspergillus serology    Diabetes mellitus 01/2022    Hyperlipidemia 2015    Hypertension 2015    Insomnia 2015    Ulcerative colitis        Past Surgical History:   Procedure Laterality Date    BRONCHOSCOPY WITH FLUOROSCOPY Left 11/20/2023    Procedure: BRONCHOSCOPY, WITH FLUOROSCOPY;  Surgeon: Lisa Villarreal MD;  Location: [facility] ENDO;  Service: Pulmonary;  Laterality: Left;    BRONCHOSCOPY WITH FLUOROSCOPY N/A 11/30/2023    Procedure: BRONCHOSCOPY, WITH FLUOROSCOPY;  Surgeon: Lisa Villarreal MD;  Location: [facility] ENDO;  Service: Pulmonary;  Laterality: N/A;    CARDIAC ELECTROPHYSIOLOGY MAPPING AND ABLATION  2017    SVT     CHOLECYSTECTOMY  2011    COLONOSCOPY N/A 5/3/2022    Procedure: COLONOSCOPY;  Surgeon: Shan Jean III, MD;  Location: [facility];  Service: Endoscopy;  Laterality: N/A;    COLONOSCOPY N/A 3/16/2024    Procedure: COLONOSCOPY;  Surgeon: ALEKSANDER Ramos MD;  Location: [facility];  Service: Endoscopy;  Laterality: N/A;    COLONOSCOPY WITH FECAL MICROBIOTA TRANSFER N/A 3/21/2023    Procedure: COLONOSCOPY, WITH FECAL MICROBIOTA TRANSFER;  Surgeon: David Millan MD;  Location: [facility];  Service: Endoscopy;  Laterality: N/A;    ESOPHAGOGASTRODUODENOSCOPY N/A 4/24/2023    Procedure: EGD (ESOPHAGOGASTRODUODENOSCOPY);  Surgeon: Shan Jean III, MD;  Location: [facility];  Service: Endoscopy;  Laterality: N/A;    ESOPHAGOGASTRODUODENOSCOPY N/A 6/5/2024    Procedure: EGD (ESOPHAGOGASTRODUODENOSCOPY);  Surgeon: Odalis Mccabe MD;  Location: [facility];  Service: Endoscopy;  Laterality: N/A;    FLEXIBLE SIGMOIDOSCOPY N/A 6/5/2024    Procedure: SIGMOIDOSCOPY, FLEXIBLE;  Surgeon: Odalis Mccabe MD;  Location: [facility];  Service: Endoscopy;  Laterality: N/A;    FLEXIBLE SIGMOIDOSCOPY N/A 7/16/2024    Procedure: SIGMOIDOSCOPY, FLEXIBLE;  Surgeon: Shan Jean III, MD;  Location: [facility];  Service: Endoscopy;  Laterality: N/A;    FLEXIBLE SIGMOIDOSCOPY N/A 8/13/2024    Procedure: SIGMOIDOSCOPY, FLEXIBLE;  Surgeon: Shan Jean III, MD;  Location: [facility];  Service: Endoscopy;  Laterality: N/A;    GANGLION CYST EXCISION Left 1992    UMBILICAL HERNIA REPAIR  2011    with mesh       Review of patient's allergies indicates:  No Known Allergies    Current Facility-Administered Medications on File Prior to Encounter   Medication    lactated ringers infusion     Current Outpatient Medications on File Prior to Encounter   Medication Sig    budesonide (ENTOCORT EC) 3 mg capsule Take 3 capsules (9 mg total) by mouth once daily.    busPIRone (BUSPAR) 15 MG tablet Take 1 tablet (15 mg total) by  mouth 3 (three) times daily.    colchicine (COLCRYS) 0.6 mg tablet Take 1 tablet (0.6 mg total) by mouth 2 (two) times daily.    EScitalopram oxalate (LEXAPRO) 10 MG tablet Take 1 tablet (10 mg total) by mouth once daily.    hyoscyamine (LEVBID) 0.375 mg Tb12 Take 0.375 mg by mouth 2 (two) times daily.    LORazepam (ATIVAN) 0.5 MG tablet Take 1 tablet (0.5 mg total) by mouth every 6 (six) hours as needed for Anxiety.    metoprolol succinate (TOPROL-XL) 50 MG 24 hr tablet Take 1 tablet (50 mg total) by mouth once daily.    pantoprazole (PROTONIX) 40 MG tablet Take 40 mg by mouth 2 (two) times daily.    promethazine (PHENERGAN) 25 MG tablet Take 25 mg by mouth 4 (four) times daily as needed for Nausea.    rivaroxaban (XARELTO) 20 mg Tab Take 1 tablet (20 mg total) by mouth daily with dinner or evening meal.    semaglutide (OZEMPIC) 0.25 mg or 0.5 mg (2 mg/3 mL) pen injector Inject 0.25 mg into the skin every 7 days.    simvastatin (ZOCOR) 20 MG tablet Take 1 tablet (20 mg total) by mouth every evening.    sodium zirconium cyclosilicate (LOKELMA) 5 gram packet Take 1 packet (5 g total) by mouth 2 (two) times a day. Mix entire contents of packet(s) into drinking glass containing 3 tablespoons of water; stir well and drink immediately. Add water and repeat until no powder remains to receive entire dose.    zolpidem (AMBIEN) 10 mg Tab Take 1 tablet (10 mg total) by mouth nightly as needed (insomnia).     Family History       Problem Relation (Age of Onset)    Asthma Mother          Tobacco Use    Smoking status: Former     Average packs/day: 1 pack/day for 30.0 years (30.0 ttl pk-yrs)     Types: Cigarettes     Start date: 1993    Smokeless tobacco: Never    Tobacco comments:     Pt states he has stopped smoking with Chantix three weeks ago. 12/1/23   Substance and Sexual Activity    Alcohol use: Yes     Comment: ocass    Drug use: Not Currently    Sexual activity: Not Currently     Review of Systems   Constitutional:   Positive for chills and fatigue. Negative for fever.   Respiratory:  Negative for shortness of breath and wheezing.    Cardiovascular:  Negative for chest pain and palpitations.   Gastrointestinal:  Positive for abdominal pain and blood in stool. Negative for abdominal distention.   Neurological:  Negative for weakness and light-headedness.   Psychiatric/Behavioral:  Negative for agitation and confusion.      Objective:     Vital Signs (Most Recent):  Temp: 98.2 °F (36.8 °C) (12/22/24 0235)  Pulse: 90 (12/22/24 0600)  Resp: 20 (12/22/24 0613)  BP: 130/78 (12/22/24 0600)  SpO2: 97 % (12/22/24 0600) Vital Signs (24h Range):  Temp:  [98.2 °F (36.8 °C)] 98.2 °F (36.8 °C)  Pulse:  [] 90  Resp:  [14-20] 20  SpO2:  [97 %-99 %] 97 %  BP: (128-177)/() 130/78     Weight: 136.1 kg (300 lb)  Body mass index is 49.92 kg/m².     Physical Exam  Constitutional:       General: He is not in acute distress.     Appearance: He is obese. He is not toxic-appearing.   HENT:      Head: Normocephalic and atraumatic.      Nose: No congestion.   Eyes:      Extraocular Movements: Extraocular movements intact.   Cardiovascular:      Rate and Rhythm: Normal rate and regular rhythm.   Pulmonary:      Effort: Pulmonary effort is normal. No respiratory distress.      Breath sounds: No rales.   Abdominal:      Palpations: Abdomen is soft.      Tenderness: There is abdominal tenderness.   Musculoskeletal:      Right lower leg: No edema.      Left lower leg: No edema.   Skin:     General: Skin is warm and dry.   Neurological:      General: No focal deficit present.      Mental Status: He is oriented to person, place, and time.   Psychiatric:         Mood and Affect: Mood normal.         Behavior: Behavior normal.                Significant Labs: All pertinent labs within the past 24 hours have been reviewed.  BMP:   Recent Labs   Lab 12/22/24  0321   *      K 4.6   CL 99   CO2 25   BUN 10   CREATININE 1.1   CALCIUM 10.4      CBC:   Recent Labs   Lab 12/22/24  0321   WBC 17.46*   HGB 11.9*   HCT 37.9*          Significant Imaging: I have reviewed all pertinent imaging results/findings within the past 24 hours.  Assessment/Plan:     * Nausea  With emesis(non blood, non bilious), GI on board    Resume home promethazine      DVT (deep venous thrombosis)  Resume Xarelto      PUD (peptic ulcer disease)  Resume PPI      Ulcerative colitis  Does not seem to be in flare, GI consult in abundance of caution since his symptoms were felt to be significant enough for admission, does endorse hematochezia but stable Hb, CT consistent with colitis--clinically chronic although was noted to have interval worsening per radiologist    Needs better expectations set with recurrent admissions, given IV steroids in ED, will not continue unless recommended by GI    ESR, CRP and fecal calprotectin ordered      History of supraventricular tachycardia  Resume home beta blocker      Morbid obesity  Body mass index is 49.92 kg/m². Morbid obesity complicates all aspects of disease management from diagnostic modalities to treatment. Weight loss encouraged and health benefits explained to patient.         Insomnia  Resume home doxepin        VTE Risk Mitigation (From admission, onward)           Ordered     rivaroxaban tablet 20 mg  With dinner         12/22/24 0928     IP VTE HIGH RISK PATIENT  Once         12/22/24 0739     Place sequential compression device  Until discontinued         12/22/24 0739                                    Sofie Prasad MD  Department of Hospital Medicine  [facility]

## 2024-12-22 NOTE — ED PROVIDER NOTES
Chief complaint:  Abdominal Pain, Weakness, and Vomiting (With BRB in stool and consitpated, x3-4days but worse today. )      HPI:  Jacoby Jean-Baptiste is a 49 y.o. male with hx obesity, DVT on rivaroxaban, SVT, DM, UC presenting with a two day history of nonbilious, nonbloody vomiting with associated poorly localized abdominal pain.  Recent admission this month for ulcerative colitis flare marked by bloody stools in the setting of chronic diarrhea with ulcerative colitis and recent beginning of injectable for same, treated temporarily in-hospital with steroids despite unimpressive inflammatory markers.  Patient reports formed, non diarrheal bowel movements with occasional blood streaking. No large amounts of blood or diarrhea.  He describes constant, generalized abdominal pain during this time.  No associated fever.  No urinary complaints such as dysuria or flank pain.  No associated chest pain or dyspnea.    ROS: As per HPI and below:  No confusion, syncope, swelling, rash.    Review of patient's allergies indicates:  No Known Allergies    Current Discharge Medication List        CONTINUE these medications which have NOT CHANGED    Details   budesonide (ENTOCORT EC) 3 mg capsule Take 3 capsules (9 mg total) by mouth once daily.  Qty: 180 each, Refills: 0      busPIRone (BUSPAR) 15 MG tablet Take 1 tablet (15 mg total) by mouth 3 (three) times daily.  Qty: 90 tablet, Refills: 5    Associated Diagnoses: Anxiety      colchicine (COLCRYS) 0.6 mg tablet Take 1 tablet (0.6 mg total) by mouth 2 (two) times daily.  Qty: 180 tablet, Refills: 0      EScitalopram oxalate (LEXAPRO) 10 MG tablet Take 1 tablet (10 mg total) by mouth once daily.  Qty: 30 tablet, Refills: 2      hyoscyamine (LEVBID) 0.375 mg Tb12 Take 0.375 mg by mouth 2 (two) times daily.      LORazepam (ATIVAN) 0.5 MG tablet Take 1 tablet (0.5 mg total) by mouth every 6 (six) hours as needed for Anxiety.  Qty: 90 tablet, Refills: 5    Associated Diagnoses: Anxiety       metoprolol succinate (TOPROL-XL) 50 MG 24 hr tablet Take 1 tablet (50 mg total) by mouth once daily.  Qty: 90 tablet, Refills: 1    Comments: .  Associated Diagnoses: History of supraventricular tachycardia      pantoprazole (PROTONIX) 40 MG tablet Take 40 mg by mouth 2 (two) times daily.      promethazine (PHENERGAN) 25 MG tablet Take 25 mg by mouth 4 (four) times daily as needed for Nausea.      rivaroxaban (XARELTO) 20 mg Tab Take 1 tablet (20 mg total) by mouth daily with dinner or evening meal.  Qty: 90 tablet, Refills: 0      semaglutide (OZEMPIC) 0.25 mg or 0.5 mg (2 mg/3 mL) pen injector Inject 0.25 mg into the skin every 7 days.  Qty: 3 mL, Refills: 0      simvastatin (ZOCOR) 20 MG tablet Take 1 tablet (20 mg total) by mouth every evening.  Qty: 90 tablet, Refills: 1    Associated Diagnoses: Pure hypercholesterolemia      zolpidem (AMBIEN) 10 mg Tab Take 1 tablet (10 mg total) by mouth nightly as needed (insomnia).  Qty: 30 tablet, Refills: 2      sodium zirconium cyclosilicate (LOKELMA) 5 gram packet Take 1 packet (5 g total) by mouth 2 (two) times a day. Mix entire contents of packet(s) into drinking glass containing 3 tablespoons of water; stir well and drink immediately. Add water and repeat until no powder remains to receive entire dose.  Qty: 30 packet, Refills: 0             PMH:  As per HPI and below:  Past Medical History:   Diagnosis Date    C. difficile colitis     Cavitary pneumonia 11/2023    pos aspergillus serology    Diabetes mellitus 01/2022    Hyperlipidemia 2015    Hypertension 2015    Insomnia 2015    Ulcerative colitis      Past Surgical History:   Procedure Laterality Date    BRONCHOSCOPY WITH FLUOROSCOPY Left 11/20/2023    Procedure: BRONCHOSCOPY, WITH FLUOROSCOPY;  Surgeon: Lisa Villarreal MD;  Location: [facility];  Service: Pulmonary;  Laterality: Left;    BRONCHOSCOPY WITH FLUOROSCOPY N/A 11/30/2023    Procedure: BRONCHOSCOPY, WITH FLUOROSCOPY;  Surgeon: Lisa Villarreal  MD;  [facility]: [facility];  Service: Pulmonary;  Laterality: N/A;    CARDIAC ELECTROPHYSIOLOGY MAPPING AND ABLATION  2017    SVT    CHOLECYSTECTOMY  2011    COLONOSCOPY N/A 5/3/2022    Procedure: COLONOSCOPY;  Surgeon: Shan Jean III, MD;  [facility];  Service: Endoscopy;  Laterality: N/A;    COLONOSCOPY N/A 3/16/2024    Procedure: COLONOSCOPY;  Surgeon: ALEKSANDER Ramos MD;  Location: [facility] ENDO;  Service: Endoscopy;  Laterality: N/A;    COLONOSCOPY WITH FECAL MICROBIOTA TRANSFER N/A 3/21/2023    Procedure: COLONOSCOPY, WITH FECAL MICROBIOTA TRANSFER;  Surgeon: David Millan MD;  Location: [facility] ENDO;  Service: Endoscopy;  Laterality: N/A;    ESOPHAGOGASTRODUODENOSCOPY N/A 4/24/2023    Procedure: EGD (ESOPHAGOGASTRODUODENOSCOPY);  Surgeon: Shan Jean III, MD;  [facility];  Service: Endoscopy;  Laterality: N/A;    ESOPHAGOGASTRODUODENOSCOPY N/A 6/5/2024    Procedure: EGD (ESOPHAGOGASTRODUODENOSCOPY);  Surgeon: Odalis Mccabe MD;  [facility];  Service: Endoscopy;  Laterality: N/A;    FLEXIBLE SIGMOIDOSCOPY N/A 6/5/2024    Procedure: SIGMOIDOSCOPY, FLEXIBLE;  Surgeon: Odalis Mccabe MD;  [facility];  Service: Endoscopy;  Laterality: N/A;    FLEXIBLE SIGMOIDOSCOPY N/A 7/16/2024    Procedure: SIGMOIDOSCOPY, FLEXIBLE;  Surgeon: Shan Jean III, MD;  [facility];  Service: Endoscopy;  Laterality: N/A;    FLEXIBLE SIGMOIDOSCOPY N/A 8/13/2024    Procedure: SIGMOIDOSCOPY, FLEXIBLE;  Surgeon: Shan Jean III, MD;  [facility];  Service: Endoscopy;  Laterality: N/A;    GANGLION CYST EXCISION Left 1992    UMBILICAL HERNIA REPAIR  2011    with mesh       Social History     Socioeconomic History    Marital status: Single   Tobacco Use    Smoking status: Former     Average packs/day: 1 pack/day for 30.0 years (30.0 ttl pk-yrs)     Types: Cigarettes     Start date: 1993    Smokeless tobacco: Never    Tobacco comments:     Pt states  he has stopped smoking with Chantix three weeks ago. 12/1/23   Substance and Sexual Activity    Alcohol use: Yes     Comment: ocass    Drug use: Not Currently    Sexual activity: Not Currently     Social Drivers of Health     Financial Resource Strain: Low Risk  (12/23/2024)    Overall Financial Resource Strain (CARDIA)     Difficulty of Paying Living Expenses: Not hard at all   Food Insecurity: No Food Insecurity (12/23/2024)    Hunger Vital Sign     Worried About Running Out of Food in the Last Year: Never true     Ran Out of Food in the Last Year: Never true   Transportation Needs: No Transportation Needs (12/23/2024)    TRANSPORTATION NEEDS     Transportation : No   Physical Activity: Sufficiently Active (9/11/2024)    Exercise Vital Sign     Days of Exercise per Week: 5 days     Minutes of Exercise per Session: 30 min   Recent Concern: Physical Activity - Inactive (8/10/2024)    Exercise Vital Sign     Days of Exercise per Week: 0 days     Minutes of Exercise per Session: 0 min   Stress: No Stress Concern Present (12/23/2024)    [employer] [place] [employer] - Occupational Stress Questionnaire     Feeling of Stress : Not at all   Recent Concern: Stress - Stress Concern Present (11/22/2024)    [employer] [place] [employer] - Occupational Stress Questionnaire     Feeling of Stress : Rather much   Housing Stability: Low Risk  (12/23/2024)    Housing Stability Vital Sign     Unable to Pay for Housing in the Last Year: No     Homeless in the Last Year: No       Family History   Problem Relation Name Age of Onset    Asthma Mother         Physical Exam:    Vitals:    12/23/24 0001   BP: (!) 143/90   Pulse: 72   Resp:    Temp: 98.6 °F (37 °C)     GENERAL:  No apparent distress.  Alert.  Large body habitus.  HEENT:  Moist mucous membranes.  Normocephalic and atraumatic.    NECK:  No swelling.  Midline trachea.   CARDIOVASCULAR:  Mild tachycardia with regular rhythm.  2+ radial pulses.    PULMONARY:   Lungs clear to auscultation bilaterally.  No wheezes, rales, or rhonci.    ABDOMEN:  Generalized tenderness more prominent on the left with left upper and lower quadrant tenderness with mild voluntary guarding.  No involuntary guarding, distention, palpable masses, hernias  EXTREMITIES:  Warm and well perfused.  Brisk capillary refill.  Trace pitting pedal edema.  NEUROLOGICAL:  Normal mental status.  Appropriate and conversant.    SKIN:  No rashes or ecchymoses.    BACK:  Atraumatic.  No CVA tenderness to palpation.      Labs Reviewed   CBC W/ AUTO DIFFERENTIAL - Abnormal       Result Value    WBC 17.46 (*)     RBC 4.40 (*)     Hemoglobin 11.9 (*)     Hematocrit 37.9 (*)     MCV 86      MCH 27.0      MCHC 31.4 (*)     RDW 17.4 (*)     Platelets 304      MPV 10.8      Immature Granulocytes 0.5      Gran # (ANC) 15.6 (*)     Immature Grans (Abs) 0.09 (*)     Lymph # 0.9 (*)     Mono # 0.8      Eos # 0.0      Baso # 0.03      nRBC 0      Gran % 89.1 (*)     Lymph % 5.4 (*)     Mono % 4.6      Eosinophil % 0.2      Basophil % 0.2      Differential Method Automated     COMPREHENSIVE METABOLIC PANEL - Abnormal    Sodium 136      Potassium 4.6      Chloride 99      CO2 25      Glucose 111 (*)     BUN 10      Creatinine 1.1      Calcium 10.4      Total Protein 8.3      Albumin 4.5      Total Bilirubin 0.6      Alkaline Phosphatase 88      AST 19      ALT 16      eGFR >60.0      Anion Gap 12     LIPASE    Lipase 4     CALPROTECTIN, STOOL   TYPE & SCREEN    Group & Rh A POS      Indirect Stephen NEG      Specimen Outdate 12/25/2024 23:59         Current Discharge Medication List        CONTINUE these medications which have NOT CHANGED    Details   budesonide (ENTOCORT EC) 3 mg capsule Take 3 capsules (9 mg total) by mouth once daily.  Qty: 180 each, Refills: 0      busPIRone (BUSPAR) 15 MG tablet Take 1 tablet (15 mg total) by mouth 3 (three) times daily.  Qty: 90 tablet, Refills: 5    Associated Diagnoses: Anxiety       colchicine (COLCRYS) 0.6 mg tablet Take 1 tablet (0.6 mg total) by mouth 2 (two) times daily.  Qty: 180 tablet, Refills: 0      EScitalopram oxalate (LEXAPRO) 10 MG tablet Take 1 tablet (10 mg total) by mouth once daily.  Qty: 30 tablet, Refills: 2      hyoscyamine (LEVBID) 0.375 mg Tb12 Take 0.375 mg by mouth 2 (two) times daily.      LORazepam (ATIVAN) 0.5 MG tablet Take 1 tablet (0.5 mg total) by mouth every 6 (six) hours as needed for Anxiety.  Qty: 90 tablet, Refills: 5    Associated Diagnoses: Anxiety      metoprolol succinate (TOPROL-XL) 50 MG 24 hr tablet Take 1 tablet (50 mg total) by mouth once daily.  Qty: 90 tablet, Refills: 1    Comments: .  Associated Diagnoses: History of supraventricular tachycardia      pantoprazole (PROTONIX) 40 MG tablet Take 40 mg by mouth 2 (two) times daily.      promethazine (PHENERGAN) 25 MG tablet Take 25 mg by mouth 4 (four) times daily as needed for Nausea.      rivaroxaban (XARELTO) 20 mg Tab Take 1 tablet (20 mg total) by mouth daily with dinner or evening meal.  Qty: 90 tablet, Refills: 0      semaglutide (OZEMPIC) 0.25 mg or 0.5 mg (2 mg/3 mL) pen injector Inject 0.25 mg into the skin every 7 days.  Qty: 3 mL, Refills: 0      simvastatin (ZOCOR) 20 MG tablet Take 1 tablet (20 mg total) by mouth every evening.  Qty: 90 tablet, Refills: 1    Associated Diagnoses: Pure hypercholesterolemia      zolpidem (AMBIEN) 10 mg Tab Take 1 tablet (10 mg total) by mouth nightly as needed (insomnia).  Qty: 30 tablet, Refills: 2      sodium zirconium cyclosilicate (LOKELMA) 5 gram packet Take 1 packet (5 g total) by mouth 2 (two) times a day. Mix entire contents of packet(s) into drinking glass containing 3 tablespoons of water; stir well and drink immediately. Add water and repeat until no powder remains to receive entire dose.  Qty: 30 packet, Refills: 0             Orders Placed This Encounter   Procedures    CT Abdomen Pelvis With IV Contrast NO Oral Contrast    CBC auto  differential    Comprehensive metabolic panel    Lipase    Comprehensive Metabolic Panel (CMP)    Magnesium    CBC with Automated Differential    Sedimentation rate    C-reactive protein    Calprotectin, Stool    Diet Cardiac    Cardiac Monitoring - Adult    Vital signs    Bladder scan    Notify Physician    Place sequential compression device    Recheck Blood Glucose:    Full code    Inpatient consult to Gastroenterology    IP Wound Care consult Nurse    EKG 12-lead    EKG 12-lead    Type & Screen    Insert Saline lock IV    Saline lock IV    Admit to Inpatient    Fall precautions       Imaging Results               CT Abdomen Pelvis With IV Contrast NO Oral Contrast (Final result)  Result time 12/22/24 05:20:56      Final result by Fernando Fenton MD (12/22/24 05:20:56)                   Impression:      Findings referable to the colon consistent with colitis, involving the mid to distal transverse colon and descending colon extending to the level of the sigmoid colon, with progression compared to the prior examination, as above.    Additional findings as above.    This report was flagged in Epic as abnormal.      Electronically signed by: Fernando Fenton  Date:    12/22/2024  Time:    05:20               Narrative:    EXAMINATION:  CT ABDOMEN PELVIS WITH IV CONTRAST    CLINICAL HISTORY:  LLQ abdominal pain;Nausea/vomiting;Hx UC;    TECHNIQUE:  Low dose axial images, sagittal and coronal reformations were obtained from the lung bases to the pubic symphysis following the IV administration of 100 mL of Omnipaque 350 oral contrast was not utilized, single phase postcontrast CT examination of the abdomen and pelvis is submitted.    COMPARISON:  CT examination of the abdomen and pelvis December 6, 2024    FINDINGS:  The visualized lung bases demonstrate minimal atelectatic appearing change.  The stomach demonstrates mild fluid and air and ingested material within the gastric lumen, nonspecific appearance.  There is  a moderate-sized duodenal diverticulum without inflammatory change.    The gallbladder is surgically absent.  Diminished attenuation of the liver may relate to diffuse fatty infiltrate.  There is no evidence for acute process of the liver, pancreas, spleen, or adrenal glands.    Bilateral nonobstructing nephrolithiasis is noted.  There is no evidence for ureteral calculus or obstructive uropathy bilaterally.  The abdominal aorta appears normal in caliber, demonstrates appropriate opacification.  Atherosclerotic change noted.  The urinary bladder appears unremarkable for degree of distention.    There is no evidence for small bowel obstructive process.  The appendix is identified, it does not appear inflamed.    There is appearance of mild pericolonic edema/inflammation with appearance of mild wall thickening involving the mid to distal transverse colon, the splenic flexure, the descending colon, and more prominently the sigmoid colon.  This demonstrates progression when compared to the prior examination.  There is no evidence for colonic obstructive change.  There is no evidence for free intraperitoneal air.    The visualized osseous structures demonstrate chronic change.  Sacral Tarlov nerve root sheath cysts are noted.                                  (Rad read)    The patient was informed of the incidental finding(s) as well as the need for PCP or specialist follow-up for reevaluation and possible further investigation or monitoring.      ED Course as of 12/23/24 0052   Sun Dec 22, 2024   0823 Sinus rhythm 87 beats per minute normal axis no ST elevation or depression or T-wave inversion independently interpreted [EF]      ED Course User Index  [EF] Marcelino Garcia MD       MDM:    49 y.o. male with a two day history of vomiting and poorly localized abdominal pain more tender on the left with examination.  He does have some blood in the stools without diarrhea.  Certainly ulcerative colitis flare is considered  with his extensive history, although certainly related complication such as obstruction or unrelated process such as appendicitis, abscess also considered.  Laboratories including CBC to exclude critical anemia given blood in the stools sent.  Low suspicion for cardiac process with EKG ordered.  Other labs to exclude end-organ dysfunction including LFTs and lipase sent as well.  Analgesia and antiemetic with IV fluids given for symptomatic relief pending further studies.  CT shows colitis likely related to ulcerative colitis with likely flare.  Patient has had rectal bleeding although none here and no significant decline in hemoglobin.  I do not think transfusion is indicated and doubt life-threatening bleeding.  I do think he would merit admission particularly with vomiting for pain control and antiemetics along with treatment..  Initial steroids given in the emergency department.  No sign of other life-threatening complication on CT requiring separate intervention.    Diagnoses:    1. Generalized abdominal pain   2.  Vomiting   3. Colitis  4. Rectal bleeding       Kirk Franks MD  12/22/24 0551       Kirk Franks MD  12/23/24 0052

## 2024-12-22 NOTE — ASSESSMENT & PLAN NOTE
Body mass index is 49.92 kg/m². Morbid obesity complicates all aspects of disease management from diagnostic modalities to treatment. Weight loss encouraged and health benefits explained to patient.

## 2024-12-22 NOTE — SUBJECTIVE & OBJECTIVE
Past Medical History:   Diagnosis Date    C. difficile colitis     Cavitary pneumonia 11/2023    pos aspergillus serology    Diabetes mellitus 01/2022    Hyperlipidemia 2015    Hypertension 2015    Insomnia 2015    Ulcerative colitis        Past Surgical History:   Procedure Laterality Date    BRONCHOSCOPY WITH FLUOROSCOPY Left 11/20/2023    Procedure: BRONCHOSCOPY, WITH FLUOROSCOPY;  Surgeon: Lisa Villarreal MD;  Location: [facility];  Service: Pulmonary;  Laterality: Left;    BRONCHOSCOPY WITH FLUOROSCOPY N/A 11/30/2023    Procedure: BRONCHOSCOPY, WITH FLUOROSCOPY;  Surgeon: Lisa Villarreal MD;  Location: [facility];  Service: Pulmonary;  Laterality: N/A;    CARDIAC ELECTROPHYSIOLOGY MAPPING AND ABLATION  2017    SVT    CHOLECYSTECTOMY  2011    COLONOSCOPY N/A 5/3/2022    Procedure: COLONOSCOPY;  Surgeon: Shan Jean III, MD;  Location: [facility];  Service: Endoscopy;  Laterality: N/A;    COLONOSCOPY N/A 3/16/2024    Procedure: COLONOSCOPY;  Surgeon: ALEKSANDER Ramos MD;  Location: [facility];  Service: Endoscopy;  Laterality: N/A;    COLONOSCOPY WITH FECAL MICROBIOTA TRANSFER N/A 3/21/2023    Procedure: COLONOSCOPY, WITH FECAL MICROBIOTA TRANSFER;  Surgeon: David Millan MD;  Location: [facility];  Service: Endoscopy;  Laterality: N/A;    ESOPHAGOGASTRODUODENOSCOPY N/A 4/24/2023    Procedure: EGD (ESOPHAGOGASTRODUODENOSCOPY);  Surgeon: Shan Jean III, MD;  Location: [facility];  Service: Endoscopy;  Laterality: N/A;    ESOPHAGOGASTRODUODENOSCOPY N/A 6/5/2024    Procedure: EGD (ESOPHAGOGASTRODUODENOSCOPY);  Surgeon: Odalis Mccabe MD;  Location: [facility];  Service: Endoscopy;  Laterality: N/A;    FLEXIBLE SIGMOIDOSCOPY N/A 6/5/2024    Procedure: SIGMOIDOSCOPY, FLEXIBLE;  Surgeon: Odalis Mccabe MD;  Location: [facility];  Service: Endoscopy;  Laterality: N/A;    FLEXIBLE SIGMOIDOSCOPY N/A 7/16/2024    Procedure: SIGMOIDOSCOPY, FLEXIBLE;  Surgeon: Shan Jean III, MD;   Location: [facility];  Service: Endoscopy;  Laterality: N/A;    FLEXIBLE SIGMOIDOSCOPY N/A 8/13/2024    Procedure: SIGMOIDOSCOPY, FLEXIBLE;  Surgeon: Shan Jean III, MD;  Location: [facility];  Service: Endoscopy;  Laterality: N/A;    GANGLION CYST EXCISION Left 1992    UMBILICAL HERNIA REPAIR  2011    with mesh       Review of patient's allergies indicates:  No Known Allergies    Current Facility-Administered Medications on File Prior to Encounter   Medication    lactated ringers infusion     Current Outpatient Medications on File Prior to Encounter   Medication Sig    budesonide (ENTOCORT EC) 3 mg capsule Take 3 capsules (9 mg total) by mouth once daily.    busPIRone (BUSPAR) 15 MG tablet Take 1 tablet (15 mg total) by mouth 3 (three) times daily.    colchicine (COLCRYS) 0.6 mg tablet Take 1 tablet (0.6 mg total) by mouth 2 (two) times daily.    EScitalopram oxalate (LEXAPRO) 10 MG tablet Take 1 tablet (10 mg total) by mouth once daily.    hyoscyamine (LEVBID) 0.375 mg Tb12 Take 0.375 mg by mouth 2 (two) times daily.    LORazepam (ATIVAN) 0.5 MG tablet Take 1 tablet (0.5 mg total) by mouth every 6 (six) hours as needed for Anxiety.    metoprolol succinate (TOPROL-XL) 50 MG 24 hr tablet Take 1 tablet (50 mg total) by mouth once daily.    pantoprazole (PROTONIX) 40 MG tablet Take 40 mg by mouth 2 (two) times daily.    promethazine (PHENERGAN) 25 MG tablet Take 25 mg by mouth 4 (four) times daily as needed for Nausea.    rivaroxaban (XARELTO) 20 mg Tab Take 1 tablet (20 mg total) by mouth daily with dinner or evening meal.    semaglutide (OZEMPIC) 0.25 mg or 0.5 mg (2 mg/3 mL) pen injector Inject 0.25 mg into the skin every 7 days.    simvastatin (ZOCOR) 20 MG tablet Take 1 tablet (20 mg total) by mouth every evening.    sodium zirconium cyclosilicate (LOKELMA) 5 gram packet Take 1 packet (5 g total) by mouth 2 (two) times a day. Mix entire contents of packet(s) into drinking glass containing 3 tablespoons of  water; stir well and drink immediately. Add water and repeat until no powder remains to receive entire dose.    zolpidem (AMBIEN) 10 mg Tab Take 1 tablet (10 mg total) by mouth nightly as needed (insomnia).     Family History       Problem Relation (Age of Onset)    Asthma Mother          Tobacco Use    Smoking status: Former     Average packs/day: 1 pack/day for 30.0 years (30.0 ttl pk-yrs)     Types: Cigarettes     Start date: 1993    Smokeless tobacco: Never    Tobacco comments:     Pt states he has stopped smoking with Chantix three weeks ago. 12/1/23   Substance and Sexual Activity    Alcohol use: Yes     Comment: ocass    Drug use: Not Currently    Sexual activity: Not Currently     Review of Systems   Constitutional:  Positive for chills and fatigue. Negative for fever.   Respiratory:  Negative for shortness of breath and wheezing.    Cardiovascular:  Negative for chest pain and palpitations.   Gastrointestinal:  Positive for abdominal pain and blood in stool. Negative for abdominal distention.   Neurological:  Negative for weakness and light-headedness.   Psychiatric/Behavioral:  Negative for agitation and confusion.      Objective:     Vital Signs (Most Recent):  Temp: 98.2 °F (36.8 °C) (12/22/24 0235)  Pulse: 90 (12/22/24 0600)  Resp: 20 (12/22/24 0613)  BP: 130/78 (12/22/24 0600)  SpO2: 97 % (12/22/24 0600) Vital Signs (24h Range):  Temp:  [98.2 °F (36.8 °C)] 98.2 °F (36.8 °C)  Pulse:  [] 90  Resp:  [14-20] 20  SpO2:  [97 %-99 %] 97 %  BP: (128-177)/() 130/78     Weight: 136.1 kg (300 lb)  Body mass index is 49.92 kg/m².     Physical Exam  Constitutional:       General: He is not in acute distress.     Appearance: He is obese. He is not toxic-appearing.   HENT:      Head: Normocephalic and atraumatic.      Nose: No congestion.   Eyes:      Extraocular Movements: Extraocular movements intact.   Cardiovascular:      Rate and Rhythm: Normal rate and regular rhythm.   Pulmonary:      Effort:  Pulmonary effort is normal. No respiratory distress.      Breath sounds: No rales.   Abdominal:      Palpations: Abdomen is soft.      Tenderness: There is abdominal tenderness.   Musculoskeletal:      Right lower leg: No edema.      Left lower leg: No edema.   Skin:     General: Skin is warm and dry.   Neurological:      General: No focal deficit present.      Mental Status: He is oriented to person, place, and time.   Psychiatric:         Mood and Affect: Mood normal.         Behavior: Behavior normal.                Significant Labs: All pertinent labs within the past 24 hours have been reviewed.  BMP:   Recent Labs   Lab 12/22/24  0321   *      K 4.6   CL 99   CO2 25   BUN 10   CREATININE 1.1   CALCIUM 10.4     CBC:   Recent Labs   Lab 12/22/24  0321   WBC 17.46*   HGB 11.9*   HCT 37.9*          Significant Imaging: I have reviewed all pertinent imaging results/findings within the past 24 hours.

## 2024-12-22 NOTE — PLAN OF CARE
Pending sale to [facility] - Emergency Dept  Initial Discharge Assessment       Primary Care Provider: Hunter Aguilar III, MD    Admission Diagnosis: Ulcerative pancolitis with rectal bleeding [K51.011]    Admission Date: 12/22/2024  Expected Discharge Date:     Transition of Care Barriers: None Assessment completed at bedside with pt , and all information on FaceSheet confirmed, including demographics, PCP, pharmacy and insurance.  He currently lives alone in a one story home .  His PCP is Dr. Aguilar , and preferred pharmacy is [facility] in [place] .  He is currently on Xarelto  HH/HD/Blood Thinners.  For transportation to appointments, he usually provides his own transportation.  For transportation at discharge, pt will provide his own transportation.  Plan for discharge is home with no needs at this time.  Case Management to continue to follow for discharge planning needs.     Payor: [employer] / Plan: [employer] ([employer]) / Product Type: Commercial /     Extended Emergency Contact Information  Primary Emergency Contact: Estiven (Sister-In-Law)Jessica  Address: [address] [place] [address] [facility]  Home Phone: [phone]  Mobile Phone: [phone]  Relation: Sister  Preferred language: English   needed? No  Secondary Emergency Contact: Dallas Jean-Baptiste   [place]  Mobile Phone: [phone]  Relation: Brother  Preferred language: English   needed? No    Discharge Plan A: Home  Discharge Plan B: Home      [facility]  [address]  [place] [address]  Phone: [phone] Fax: [phone]    [facility] - [place], [place] - [address]  [address] [facility] [address] [place] [address]  Phone: [phone] Fax: [phone]    [id number] - [place], [place] - [address] [facility]  [address]  [address]  [place] [address]  Phone: [phone]  Fax: [phone]      Initial Assessment (most recent)       Adult Discharge Assessment - 12/22/24 0846          Discharge Assessment    Assessment Type Discharge Planning Assessment     Confirmed/corrected address, phone number and insurance Yes     Confirmed Demographics Correct on Facesheet     Source of Information patient     When was your last doctors appointment? --   Per pt last appointment was Dec1st    Reason For Admission Dizzness     People in Home alone     Do you expect to return to your current living situation? Yes     Do you have help at home or someone to help you manage your care at home? No     Prior to hospitilization cognitive status: Alert/Oriented     Current cognitive status: Alert/Oriented     Walking or Climbing Stairs Difficulty no     Dressing/Bathing Difficulty no     Do you have any problems with: --   Pt does not need asssitance with ADLs    Home Layout Able to live on 1st floor     Equipment Currently Used at Home none     Readmission within 30 days? No     Patient currently being followed by outpatient case management? No     Do you currently have service(s) that help you manage your care at home? No     Do you take prescription medications? Yes     Do you have prescription coverage? Yes     Coverage [facility]     Do you have any problems affording any of your prescribed medications? No     Is the patient taking medications as prescribed? yes     Who is going to help you get home at discharge? Per pt he does not have assistance     How do you get to doctors appointments? car, drives self     Are you on dialysis? No     Do you take coumadin? No   Pt is  on Xarelto    Discharge Plan A Home     Discharge Plan B Home     DME Needed Upon Discharge  none     Discharge Plan discussed with: Patient     Transition of Care Barriers None

## 2024-12-23 ENCOUNTER — EXTERNAL HOME HEALTH (OUTPATIENT)
Dept: HOME HEALTH SERVICES | Facility: HOSPITAL | Age: 49
End: 2024-12-23
Payer: COMMERCIAL

## 2024-12-23 PROBLEM — E11.43 DIABETIC GASTROPARESIS: Status: ACTIVE | Noted: 2024-12-23

## 2024-12-23 PROBLEM — D64.9 ANEMIA: Status: ACTIVE | Noted: 2024-12-23

## 2024-12-23 PROBLEM — K31.84 DIABETIC GASTROPARESIS: Status: ACTIVE | Noted: 2024-12-23

## 2024-12-23 LAB
ALBUMIN SERPL BCP-MCNC: 3.9 G/DL (ref 3.5–5.2)
ALP SERPL-CCNC: 69 U/L (ref 55–135)
ALT SERPL W/O P-5'-P-CCNC: 12 U/L (ref 10–44)
ANION GAP SERPL CALC-SCNC: 8 MMOL/L (ref 8–16)
AST SERPL-CCNC: 12 U/L (ref 10–40)
BASOPHILS # BLD AUTO: 0.04 K/UL (ref 0–0.2)
BASOPHILS NFR BLD: 0.3 % (ref 0–1.9)
BILIRUB SERPL-MCNC: 0.7 MG/DL (ref 0.1–1)
BUN SERPL-MCNC: 18 MG/DL (ref 6–20)
CALCIUM SERPL-MCNC: 9.2 MG/DL (ref 8.7–10.5)
CHLORIDE SERPL-SCNC: 101 MMOL/L (ref 95–110)
CO2 SERPL-SCNC: 27 MMOL/L (ref 23–29)
CREAT SERPL-MCNC: 1.1 MG/DL (ref 0.5–1.4)
DIFFERENTIAL METHOD BLD: ABNORMAL
EOSINOPHIL # BLD AUTO: 0.1 K/UL (ref 0–0.5)
EOSINOPHIL NFR BLD: 0.5 % (ref 0–8)
ERYTHROCYTE [DISTWIDTH] IN BLOOD BY AUTOMATED COUNT: 17.2 % (ref 11.5–14.5)
EST. GFR  (NO RACE VARIABLE): >60 ML/MIN/1.73 M^2
GLUCOSE SERPL-MCNC: 95 MG/DL (ref 70–110)
HCT VFR BLD AUTO: 33.1 % (ref 40–54)
HGB BLD-MCNC: 10.3 G/DL (ref 14–18)
IMM GRANULOCYTES # BLD AUTO: 0.08 K/UL (ref 0–0.04)
IMM GRANULOCYTES NFR BLD AUTO: 0.5 % (ref 0–0.5)
LYMPHOCYTES # BLD AUTO: 1.4 K/UL (ref 1–4.8)
LYMPHOCYTES NFR BLD: 9.8 % (ref 18–48)
MAGNESIUM SERPL-MCNC: 1.6 MG/DL (ref 1.6–2.6)
MCH RBC QN AUTO: 27.3 PG (ref 27–31)
MCHC RBC AUTO-ENTMCNC: 31.1 G/DL (ref 32–36)
MCV RBC AUTO: 88 FL (ref 82–98)
MONOCYTES # BLD AUTO: 1.5 K/UL (ref 0.3–1)
MONOCYTES NFR BLD: 10 % (ref 4–15)
NEUTROPHILS # BLD AUTO: 11.5 K/UL (ref 1.8–7.7)
NEUTROPHILS NFR BLD: 78.9 % (ref 38–73)
NRBC BLD-RTO: 0 /100 WBC
PLATELET # BLD AUTO: 249 K/UL (ref 150–450)
PMV BLD AUTO: 10.9 FL (ref 9.2–12.9)
POCT GLUCOSE: 115 MG/DL (ref 70–110)
POTASSIUM SERPL-SCNC: 4.4 MMOL/L (ref 3.5–5.1)
PROT SERPL-MCNC: 7 G/DL (ref 6–8.4)
RBC # BLD AUTO: 3.77 M/UL (ref 4.6–6.2)
SODIUM SERPL-SCNC: 136 MMOL/L (ref 136–145)
WBC # BLD AUTO: 14.62 K/UL (ref 3.9–12.7)

## 2024-12-23 PROCEDURE — 83735 ASSAY OF MAGNESIUM: CPT

## 2024-12-23 PROCEDURE — 36415 COLL VENOUS BLD VENIPUNCTURE: CPT

## 2024-12-23 PROCEDURE — 12000002 HC ACUTE/MED SURGE SEMI-PRIVATE ROOM

## 2024-12-23 PROCEDURE — 25000003 PHARM REV CODE 250: Performed by: NURSE PRACTITIONER

## 2024-12-23 PROCEDURE — 25000003 PHARM REV CODE 250

## 2024-12-23 PROCEDURE — 63600175 PHARM REV CODE 636 W HCPCS

## 2024-12-23 PROCEDURE — 85025 COMPLETE CBC W/AUTO DIFF WBC: CPT

## 2024-12-23 PROCEDURE — 80053 COMPREHEN METABOLIC PANEL: CPT

## 2024-12-23 RX ORDER — GLUCAGON 1 MG
1 KIT INJECTION
Status: DISCONTINUED | OUTPATIENT
Start: 2024-12-23 | End: 2024-12-24 | Stop reason: HOSPADM

## 2024-12-23 RX ORDER — METOCLOPRAMIDE 5 MG/1
5 TABLET ORAL EVERY 8 HOURS PRN
Qty: 90 TABLET | Refills: 0 | Status: SHIPPED | OUTPATIENT
Start: 2024-12-23 | End: 2024-12-24

## 2024-12-23 RX ORDER — IBUPROFEN 200 MG
16 TABLET ORAL
Status: DISCONTINUED | OUTPATIENT
Start: 2024-12-23 | End: 2024-12-24 | Stop reason: HOSPADM

## 2024-12-23 RX ORDER — INSULIN ASPART 100 [IU]/ML
0-5 INJECTION, SOLUTION INTRAVENOUS; SUBCUTANEOUS
Status: DISCONTINUED | OUTPATIENT
Start: 2024-12-23 | End: 2024-12-24 | Stop reason: HOSPADM

## 2024-12-23 RX ORDER — METOCLOPRAMIDE 5 MG/1
5 TABLET ORAL
Status: DISCONTINUED | OUTPATIENT
Start: 2024-12-23 | End: 2024-12-24 | Stop reason: HOSPADM

## 2024-12-23 RX ORDER — HYDROCODONE BITARTRATE AND ACETAMINOPHEN 5; 325 MG/1; MG/1
1 TABLET ORAL EVERY 6 HOURS PRN
Status: DISCONTINUED | OUTPATIENT
Start: 2024-12-23 | End: 2024-12-24 | Stop reason: HOSPADM

## 2024-12-23 RX ORDER — METOCLOPRAMIDE 5 MG/1
5 TABLET ORAL
Status: DISCONTINUED | OUTPATIENT
Start: 2024-12-23 | End: 2024-12-23

## 2024-12-23 RX ORDER — IBUPROFEN 200 MG
24 TABLET ORAL
Status: DISCONTINUED | OUTPATIENT
Start: 2024-12-23 | End: 2024-12-24 | Stop reason: HOSPADM

## 2024-12-23 RX ADMIN — BUSPIRONE HYDROCHLORIDE 15 MG: 5 TABLET ORAL at 08:12

## 2024-12-23 RX ADMIN — Medication 6 MG: at 10:12

## 2024-12-23 RX ADMIN — METOPROLOL SUCCINATE 50 MG: 50 TABLET, FILM COATED, EXTENDED RELEASE ORAL at 09:12

## 2024-12-23 RX ADMIN — METOCLOPRAMIDE 5 MG: 5 TABLET ORAL at 08:12

## 2024-12-23 RX ADMIN — METOCLOPRAMIDE 5 MG: 5 TABLET ORAL at 01:12

## 2024-12-23 RX ADMIN — ACETAMINOPHEN 650 MG: 325 TABLET ORAL at 05:12

## 2024-12-23 RX ADMIN — BUSPIRONE HYDROCHLORIDE 15 MG: 5 TABLET ORAL at 02:12

## 2024-12-23 RX ADMIN — ONDANSETRON 4 MG: 2 INJECTION INTRAMUSCULAR; INTRAVENOUS at 11:12

## 2024-12-23 RX ADMIN — ATORVASTATIN CALCIUM 40 MG: 40 TABLET, FILM COATED ORAL at 08:12

## 2024-12-23 RX ADMIN — MORPHINE SULFATE 4 MG: 4 INJECTION, SOLUTION INTRAMUSCULAR; INTRAVENOUS at 11:12

## 2024-12-23 RX ADMIN — HYDROCODONE BITARTRATE AND ACETAMINOPHEN 1 TABLET: 5; 325 TABLET ORAL at 09:12

## 2024-12-23 RX ADMIN — ONDANSETRON 4 MG: 2 INJECTION INTRAMUSCULAR; INTRAVENOUS at 06:12

## 2024-12-23 RX ADMIN — PANTOPRAZOLE SODIUM 40 MG: 40 TABLET, DELAYED RELEASE ORAL at 05:12

## 2024-12-23 RX ADMIN — ESCITALOPRAM OXALATE 10 MG: 10 TABLET ORAL at 09:12

## 2024-12-23 RX ADMIN — ZOLPIDEM TARTRATE 10 MG: 5 TABLET, COATED ORAL at 10:12

## 2024-12-23 RX ADMIN — BUSPIRONE HYDROCHLORIDE 15 MG: 5 TABLET ORAL at 09:12

## 2024-12-23 RX ADMIN — RIVAROXABAN 20 MG: 20 TABLET, FILM COATED ORAL at 05:12

## 2024-12-23 RX ADMIN — ONDANSETRON 4 MG: 2 INJECTION INTRAMUSCULAR; INTRAVENOUS at 04:12

## 2024-12-23 RX ADMIN — PANTOPRAZOLE SODIUM 40 MG: 40 TABLET, DELAYED RELEASE ORAL at 04:12

## 2024-12-23 NOTE — PROGRESS NOTES
Patient Name: Jacoby Jean-Baptiste  Patient MRN: [id number]  Patient : 1975    Admit Date: 2024  Service date: 2024    Reason for Consult: n/v & colitis    PCP: Hunter Aguilar III, MD    S: N/v improved. Jose breakfast (grits) w/o issues. No diarrhea / bleeding. .     Inpatient Medications:   atorvastatin  40 mg Oral QHS    busPIRone  15 mg Oral TID    EScitalopram oxalate  10 mg Oral Daily    metoprolol succinate  50 mg Oral Daily    pantoprazole  40 mg Oral BID    rivaroxaban  20 mg Oral Daily with dinner       Current Facility-Administered Medications:     acetaminophen, 650 mg, Oral, Q8H PRN    acetaminophen, 650 mg, Oral, Q4H PRN    aluminum-magnesium hydroxide-simethicone, 30 mL, Oral, QID PRN    dextrose 50%, 12.5 g, Intravenous, PRN    dextrose 50%, 25 g, Intravenous, PRN    glucagon (human recombinant), 1 mg, Intramuscular, PRN    glucose, 16 g, Oral, PRN    glucose, 24 g, Oral, PRN    HYDROcodone-acetaminophen, 1 tablet, Oral, Q6H PRN    LORazepam, 0.5 mg, Oral, Q6H PRN    magnesium oxide, 800 mg, Oral, PRN    magnesium oxide, 800 mg, Oral, PRN    melatonin, 6 mg, Oral, Nightly PRN    morphine, 4 mg, Intravenous, Q4H PRN    naloxone, 0.02 mg, Intravenous, PRN    ondansetron, 4 mg, Intravenous, Q6H PRN    potassium bicarbonate, 35 mEq, Oral, PRN    potassium bicarbonate, 50 mEq, Oral, PRN    potassium bicarbonate, 60 mEq, Oral, PRN    potassium, sodium phosphates, 2 packet, Oral, PRN    potassium, sodium phosphates, 2 packet, Oral, PRN    potassium, sodium phosphates, 2 packet, Oral, PRN    promethazine, 25 mg, Oral, QID PRN    senna-docusate 8.6-50 mg, 1 tablet, Oral, Daily PRN    sodium chloride 0.9%, 2 mL, Intravenous, Q12H PRN    zolpidem, 10 mg, Oral, Nightly PRN    Review of Systems:  A 10 point review of systems was performed and was normal, except as mentioned in the HPI, including constitutional, HEENT, heme, lymph, cardiovascular, respiratory, gastrointestinal, genitourinary,  "neurologic, endocrine, psychiatric and musculoskeletal.      OBJECTIVE:    Physical Exam:  24 Hour Vital Sign Ranges: Temp:  [97.7 °F (36.5 °C)-98.6 °F (37 °C)] 98.4 °F (36.9 °C)  Pulse:  [67-88] 73  Resp:  [17-18] 18  SpO2:  [92 %-98 %] 96 %  BP: (127-153)/() 127/72  Most recent vitals: /72   Pulse 73   Temp 98.4 °F (36.9 °C) (Oral)   Resp 18   Ht 5' 5" (1.651 m)   Wt 136.1 kg (300 lb)   SpO2 96%   BMI 49.92 kg/m²    GEN: well-developed, well-nourished, awake and alert, non-toxic appearing adult  HEENT: PERRL, sclera anicteric, oral mucosa pink and moist without lesion  NECK: trachea midline; Good ROM  CV: regular rate and rhythm, no murmurs or gallops  RESP: clear to auscultation bilaterally, no wheezes, rhonci or rales  ABD: soft, non-tender, non-distended, normal bowel sounds  EXT: no swelling or edema, 2+ pulses distally  SKIN: no rashes or jaundice  PSYCH: normal affect    Labs:   Recent Labs     12/22/24  0321 12/23/24  0613   WBC 17.46* 14.62*   MCV 86 88    249     Recent Labs     12/22/24  0321 12/23/24  0613    136   K 4.6 4.4   CL 99 101   CO2 25 27   BUN 10 18   * 95     No results for input(s): "ALB" in the last 72 hours.    Invalid input(s): "ALKP", "SGOT", "SGPT", "TBIL", "DBIL", "TPRO"  No results for input(s): "PT", "INR", "PTT" in the last 72 hours.      Radiology Review:  CT Abdomen Pelvis With IV Contrast NO Oral Contrast   Final Result   Abnormal      Findings referable to the colon consistent with colitis, involving the mid to distal transverse colon and descending colon extending to the level of the sigmoid colon, with progression compared to the prior examination, as above.      Additional findings as above.      This report was flagged in Epic as abnormal.         Electronically signed by: Fernando Fenton   Date:    12/22/2024   Time:    05:20            IMPRESSION / RECOMMENDATIONS:   49 year old male patient who is seen today for an office visit.48 " y/o M w/ho HLD, DM on ozempic, HTN, LOLIS, umbilical hernia repair, tobacco use, C. diff s/p Vanco / dificid / FMT / rebyota, ulcerative colitis on Tremfaya presents for evaluation of n/v. Borderline WBC and CT w/ known colitis. No sig diarrhea / rectal bleeding to suggest colitis as etiology. Patient just did restart his Ozempic this past week.     -Conservative for now from GI perspective  -Continue w/ 2nd dose of Tremfaya later this week  -If recurrent issues, may need to hold Ozempic or consider Monjaro to see if better tolerated  -Ok to give prescription of Reglan 5mg PO PRN for refractory symptoms.  -F/u calpro and will need flex vs colon in next few months to re-assess colitis.     Shan TINOCO Dauterive III  12/23/2024  9:00 AM

## 2024-12-23 NOTE — HOSPITAL COURSE
49 year old male with pmhx uc admitted for nausea vomiting and abdominal pain as well an hematochezia. CT with colitis. Admitted to hospital medicine service. GI consulted. Recommended OP flex sig and reglan for gastroparesis. Monitored closely during his stay. Hematochezia improved. Reglan was initiated with  meals.  Patient was not felt to be in UC flare.  Plan was for outpatient follow up with the GI and the continuation of Reglan.  He was also scheduled to his primary care provider on 12/27.  Patient was seen and examined prior to discharge in stable condition.  He had an uneventful stay without any ongoing diarrhea or vomiting.  Use afebrile and hemodynamically stable.  He is appropriate for discharge home.

## 2024-12-23 NOTE — CONSULTS
Left inner upper arm chronic wound. Wound bed pale pink 1.3x0.9x0.2cm few dry scabs on shoulder and pink linear discoloration distal arm.  No drainage.  Patient sees Dr Jimenes and has been using hydrofera blue 3 times per week. Complete skin assessment

## 2024-12-23 NOTE — ASSESSMENT & PLAN NOTE
Does not seem to be in flare, GI consult in abundance of caution since his symptoms were felt to be significant enough for admission, does endorse hematochezia but stable Hb, CT consistent with colitis--clinically chronic although was noted to have interval worsening per radiologist    Needs better expectations set with recurrent admissions, given IV steroids in ED, will not continue unless recommended by GI    No evidence of acute exacerbation at present    We will need GI follow up and flex sig on discharge

## 2024-12-23 NOTE — PROGRESS NOTES
[facility] Medicine  Progress Note    Patient Name: Jacoby Jean-Baptiste  MRN: [id number]  Patient Class: IP- Inpatient   Admission Date: 12/22/2024  Length of Stay: 1 days  Attending Physician: Reese Buitrago MD  Primary Care Provider: Hunter Aguilar III, MD        Subjective     Principal Problem:Diabetic gastroparesis        HPI:  This is a 49 year gentleman history ulcerative and numerous admissions for the same presents with complaints of nausea nonbloody nonbilious emesis, abdominal pain and hematochezia.  Patient is noted to have moderate leukocytosis and CT imaging consistent with colitis.  Gastroenterology team was consulted for guidance in this patient with recurrent admissions.  On assessment he is calm, cooperative and in no acute distress.  He is hemodynamically stable and complains of abdominal pain.  He reports improvement of his nausea.  He does endorse ongoing hematochezia.  He is afebrile, denies malaise, body aches or upper respiratory symptoms.    Overview/Hospital Course:  49 year old male with pmhx uc admitted for nausea vomiting and abdominal pain as well an hematochezia. CT with colitis. Admitted to hospital medicine service. GI consulted. Recommended OP flex sig and reglan for gastroparesis. Monitored closely during his stay. Hematochezia improved. Reglan was initiated with  meals.     Interval History:  Patient is sitting up in chair no nausea or vomiting.  No hematochezia no abdominal pain    Review of Systems   Constitutional:  Negative for activity change and appetite change.   Gastrointestinal:  Negative for abdominal pain, blood in stool, nausea and vomiting.     Objective:     Vital Signs (Most Recent):  Temp: 98 °F (36.7 °C) (12/23/24 1056)  Pulse: 80 (12/23/24 1056)  Resp: 18 (12/23/24 1113)  BP: 134/78 (12/23/24 1056)  SpO2: 97 % (12/23/24 1056) Vital Signs (24h Range):  Temp:  [97.7 °F (36.5 °C)-98.6 °F (37 °C)] 98 °F (36.7 °C)  Pulse:  [67-80] 80  Resp:   [17-18] 18  SpO2:  [93 %-98 %] 97 %  BP: (127-143)/(72-90) 134/78     Weight: 136.1 kg (300 lb)  Body mass index is 49.92 kg/m².    Intake/Output Summary (Last 24 hours) at 12/23/2024 1355  Last data filed at 12/23/2024 0840  Gross per 24 hour   Intake 630 ml   Output 5 ml   Net 625 ml         Physical Exam  Constitutional:       Appearance: He is obese. He is not ill-appearing or toxic-appearing.   HENT:      Head: Normocephalic.      Mouth/Throat:      Mouth: Mucous membranes are moist.      Pharynx: Oropharynx is clear.   Eyes:      Conjunctiva/sclera: Conjunctivae normal.   Cardiovascular:      Rate and Rhythm: Normal rate and regular rhythm.      Pulses: Normal pulses.   Pulmonary:      Effort: Pulmonary effort is normal.      Breath sounds: Normal breath sounds.   Abdominal:      General: Bowel sounds are normal. There is no distension.      Palpations: Abdomen is soft.      Tenderness: There is no abdominal tenderness. There is no guarding.   Skin:     Capillary Refill: Capillary refill takes less than 2 seconds.   Neurological:      General: No focal deficit present.      Mental Status: He is alert and oriented to person, place, and time.   Psychiatric:         Mood and Affect: Mood normal.             Significant Labs: All pertinent labs within the past 24 hours have been reviewed.  Recent Lab Results         12/23/24  0613        Albumin 3.9       ALP 69       ALT 12       Anion Gap 8       AST 12       Baso # 0.04       Basophil % 0.3       BILIRUBIN TOTAL 0.7  Comment: For infants and newborns, interpretation of results should be based  on gestational age, weight and in agreement with clinical  observations.    Premature Infant recommended reference ranges:  Up to 24 hours.............<8.0 mg/dL  Up to 48 hours............<12.0 mg/dL  3-5 days..................<15.0 mg/dL  6-29 days.................<15.0 mg/dL         BUN 18       Calcium 9.2       Chloride 101       CO2 27       Creatinine 1.1        Differential Method Automated       eGFR >60.0       Eos # 0.1       Eos % 0.5       Glucose 95       Gran # (ANC) 11.5       Gran % 78.9       Hematocrit 33.1       Hemoglobin 10.3       Immature Grans (Abs) 0.08  Comment: Mild elevation in immature granulocytes is non specific and   can be seen in a variety of conditions including stress response,   acute inflammation, trauma and pregnancy. Correlation with other   laboratory and clinical findings is essential.         Immature Granulocytes 0.5       Lymph # 1.4       Lymph % 9.8       Magnesium  1.6       MCH 27.3       MCHC 31.1       MCV 88       Mono # 1.5       Mono % 10.0       MPV 10.9       nRBC 0       Platelet Count 249       Potassium 4.4       PROTEIN TOTAL 7.0       RBC 3.77       RDW 17.2       Sodium 136       WBC 14.62               Significant Imaging: I have reviewed all pertinent imaging results/findings within the past 24 hours.    Assessment and Plan     * Diabetic gastroparesis  GI following  Gastroparesis diet  Reglan a.c. and HS p.r.n.  Tight glycemic control  Avoid ozempic      Nausea  With emesis(non blood, non bilious), GI on board    Resume home promethazine      DVT (deep venous thrombosis)  Resume Xarelto      PUD (peptic ulcer disease)  Resume PPI      Ulcerative colitis  Does not seem to be in flare, GI consult in abundance of caution since his symptoms were felt to be significant enough for admission, does endorse hematochezia but stable Hb, CT consistent with colitis--clinically chronic although was noted to have interval worsening per radiologist    Needs better expectations set with recurrent admissions, given IV steroids in ED, will not continue unless recommended by GI    No evidence of acute exacerbation at present    We will need GI follow up and flex sig on discharge    Type 2 diabetes mellitus without complication, without long-term current use of insulin  Patient's FSGs are controlled on current medication regimen.  Last  "A1c reviewed-   Lab Results   Component Value Date    HGBA1C 5.8 11/02/2024     Most recent fingerstick glucose reviewed- No results for input(s): "POCTGLUCOSE" in the last 24 hours.  Current correctional scale  Low  Maintain anti-hyperglycemic dose as follows-   Antihyperglycemics (From admission, onward)      Start     Stop Route Frequency Ordered    12/23/24 1500  insulin aspart U-100 pen 0-5 Units         -- SubQ Before meals & nightly PRN 12/23/24 1400          Hold Oral hypoglycemics while patient is in the hospital.       History of supraventricular tachycardia  Resume home beta blocker      Morbid obesity  Body mass index is 49.92 kg/m². Morbid obesity complicates all aspects of disease management from diagnostic modalities to treatment. Weight loss encouraged and health benefits explained to patient.     Would recommend not using Ozempic given history of gastroparesis for weight loss      Insomnia  Resume home doxepin        VTE Risk Mitigation (From admission, onward)           Ordered     rivaroxaban tablet 20 mg  With dinner         12/22/24 0928     IP VTE HIGH RISK PATIENT  Once         12/22/24 0739     Place sequential compression device  Until discontinued         12/22/24 0739                    Discharge Planning   GERARDO: 12/23/2024     Code Status: Full Code   Medical Readiness for Discharge Date: 12/23/2024  Discharge Plan A: Home                        JAMIA Jordan  Department of Hospital Medicine   Formerly [facility], 1928–1983    "

## 2024-12-23 NOTE — ASSESSMENT & PLAN NOTE
Body mass index is 49.92 kg/m². Morbid obesity complicates all aspects of disease management from diagnostic modalities to treatment. Weight loss encouraged and health benefits explained to patient.     Would recommend not using Ozempic given history of gastroparesis for weight loss

## 2024-12-23 NOTE — SUBJECTIVE & OBJECTIVE
Interval History:  Patient is sitting up in chair no nausea or vomiting.  No hematochezia no abdominal pain    Review of Systems   Constitutional:  Negative for activity change and appetite change.   Gastrointestinal:  Negative for abdominal pain, blood in stool, nausea and vomiting.     Objective:     Vital Signs (Most Recent):  Temp: 98 °F (36.7 °C) (12/23/24 1056)  Pulse: 80 (12/23/24 1056)  Resp: 18 (12/23/24 1113)  BP: 134/78 (12/23/24 1056)  SpO2: 97 % (12/23/24 1056) Vital Signs (24h Range):  Temp:  [97.7 °F (36.5 °C)-98.6 °F (37 °C)] 98 °F (36.7 °C)  Pulse:  [67-80] 80  Resp:  [17-18] 18  SpO2:  [93 %-98 %] 97 %  BP: (127-143)/(72-90) 134/78     Weight: 136.1 kg (300 lb)  Body mass index is 49.92 kg/m².    Intake/Output Summary (Last 24 hours) at 12/23/2024 1355  Last data filed at 12/23/2024 0840  Gross per 24 hour   Intake 630 ml   Output 5 ml   Net 625 ml         Physical Exam  Constitutional:       Appearance: He is obese. He is not ill-appearing or toxic-appearing.   HENT:      Head: Normocephalic.      Mouth/Throat:      Mouth: Mucous membranes are moist.      Pharynx: Oropharynx is clear.   Eyes:      Conjunctiva/sclera: Conjunctivae normal.   Cardiovascular:      Rate and Rhythm: Normal rate and regular rhythm.      Pulses: Normal pulses.   Pulmonary:      Effort: Pulmonary effort is normal.      Breath sounds: Normal breath sounds.   Abdominal:      General: Bowel sounds are normal. There is no distension.      Palpations: Abdomen is soft.      Tenderness: There is no abdominal tenderness. There is no guarding.   Skin:     Capillary Refill: Capillary refill takes less than 2 seconds.   Neurological:      General: No focal deficit present.      Mental Status: He is alert and oriented to person, place, and time.   Psychiatric:         Mood and Affect: Mood normal.             Significant Labs: All pertinent labs within the past 24 hours have been reviewed.  Recent Lab Results         12/23/24  0613         Albumin 3.9       ALP 69       ALT 12       Anion Gap 8       AST 12       Baso # 0.04       Basophil % 0.3       BILIRUBIN TOTAL 0.7  Comment: For infants and newborns, interpretation of results should be based  on gestational age, weight and in agreement with clinical  observations.    Premature Infant recommended reference ranges:  Up to 24 hours.............<8.0 mg/dL  Up to 48 hours............<12.0 mg/dL  3-5 days..................<15.0 mg/dL  6-29 days.................<15.0 mg/dL         BUN 18       Calcium 9.2       Chloride 101       CO2 27       Creatinine 1.1       Differential Method Automated       eGFR >60.0       Eos # 0.1       Eos % 0.5       Glucose 95       Gran # (ANC) 11.5       Gran % 78.9       Hematocrit 33.1       Hemoglobin 10.3       Immature Grans (Abs) 0.08  Comment: Mild elevation in immature granulocytes is non specific and   can be seen in a variety of conditions including stress response,   acute inflammation, trauma and pregnancy. Correlation with other   laboratory and clinical findings is essential.         Immature Granulocytes 0.5       Lymph # 1.4       Lymph % 9.8       Magnesium  1.6       MCH 27.3       MCHC 31.1       MCV 88       Mono # 1.5       Mono % 10.0       MPV 10.9       nRBC 0       Platelet Count 249       Potassium 4.4       PROTEIN TOTAL 7.0       RBC 3.77       RDW 17.2       Sodium 136       WBC 14.62               Significant Imaging: I have reviewed all pertinent imaging results/findings within the past 24 hours.

## 2024-12-23 NOTE — ASSESSMENT & PLAN NOTE
GI following  Gastroparesis diet  Reglan a.c. and HS p.r.n.  Tight glycemic control  Avoid ozempic

## 2024-12-23 NOTE — ASSESSMENT & PLAN NOTE
"Patient's FSGs are controlled on current medication regimen.  Last A1c reviewed-   Lab Results   Component Value Date    HGBA1C 5.8 11/02/2024     Most recent fingerstick glucose reviewed- No results for input(s): "POCTGLUCOSE" in the last 24 hours.  Current correctional scale  Low  Maintain anti-hyperglycemic dose as follows-   Antihyperglycemics (From admission, onward)      Start     Stop Route Frequency Ordered    12/23/24 1500  insulin aspart U-100 pen 0-5 Units         -- SubQ Before meals & nightly PRN 12/23/24 1400          Hold Oral hypoglycemics while patient is in the hospital.     "

## 2024-12-24 VITALS
RESPIRATION RATE: 18 BRPM | WEIGHT: 300 LBS | OXYGEN SATURATION: 96 % | BODY MASS INDEX: 49.98 KG/M2 | HEART RATE: 79 BPM | HEIGHT: 65 IN | DIASTOLIC BLOOD PRESSURE: 78 MMHG | TEMPERATURE: 99 F | SYSTOLIC BLOOD PRESSURE: 147 MMHG

## 2024-12-24 LAB
ALBUMIN SERPL BCP-MCNC: 4 G/DL (ref 3.5–5.2)
ALP SERPL-CCNC: 76 U/L (ref 55–135)
ALT SERPL W/O P-5'-P-CCNC: 13 U/L (ref 10–44)
ANION GAP SERPL CALC-SCNC: 7 MMOL/L (ref 8–16)
AST SERPL-CCNC: 13 U/L (ref 10–40)
BASOPHILS # BLD AUTO: 0.07 K/UL (ref 0–0.2)
BASOPHILS NFR BLD: 0.6 % (ref 0–1.9)
BILIRUB SERPL-MCNC: 0.7 MG/DL (ref 0.1–1)
BUN SERPL-MCNC: 23 MG/DL (ref 6–20)
CALCIUM SERPL-MCNC: 9.4 MG/DL (ref 8.7–10.5)
CHLORIDE SERPL-SCNC: 100 MMOL/L (ref 95–110)
CO2 SERPL-SCNC: 28 MMOL/L (ref 23–29)
CREAT SERPL-MCNC: 1.3 MG/DL (ref 0.5–1.4)
DIFFERENTIAL METHOD BLD: ABNORMAL
EOSINOPHIL # BLD AUTO: 0.2 K/UL (ref 0–0.5)
EOSINOPHIL NFR BLD: 1.7 % (ref 0–8)
ERYTHROCYTE [DISTWIDTH] IN BLOOD BY AUTOMATED COUNT: 17 % (ref 11.5–14.5)
EST. GFR  (NO RACE VARIABLE): >60 ML/MIN/1.73 M^2
GLUCOSE SERPL-MCNC: 89 MG/DL (ref 70–110)
HCT VFR BLD AUTO: 36.2 % (ref 40–54)
HGB BLD-MCNC: 10.8 G/DL (ref 14–18)
IMM GRANULOCYTES # BLD AUTO: 0.09 K/UL (ref 0–0.04)
IMM GRANULOCYTES NFR BLD AUTO: 0.8 % (ref 0–0.5)
LYMPHOCYTES # BLD AUTO: 1.3 K/UL (ref 1–4.8)
LYMPHOCYTES NFR BLD: 10.8 % (ref 18–48)
MAGNESIUM SERPL-MCNC: 1.5 MG/DL (ref 1.6–2.6)
MCH RBC QN AUTO: 26.4 PG (ref 27–31)
MCHC RBC AUTO-ENTMCNC: 29.8 G/DL (ref 32–36)
MCV RBC AUTO: 89 FL (ref 82–98)
MONOCYTES # BLD AUTO: 1.3 K/UL (ref 0.3–1)
MONOCYTES NFR BLD: 10.5 % (ref 4–15)
NEUTROPHILS # BLD AUTO: 9.1 K/UL (ref 1.8–7.7)
NEUTROPHILS NFR BLD: 75.6 % (ref 38–73)
NRBC BLD-RTO: 0 /100 WBC
PLATELET # BLD AUTO: 245 K/UL (ref 150–450)
PMV BLD AUTO: 11.3 FL (ref 9.2–12.9)
POCT GLUCOSE: 102 MG/DL (ref 70–110)
POCT GLUCOSE: 69 MG/DL (ref 70–110)
POTASSIUM SERPL-SCNC: 4 MMOL/L (ref 3.5–5.1)
PROT SERPL-MCNC: 7 G/DL (ref 6–8.4)
RBC # BLD AUTO: 4.09 M/UL (ref 4.6–6.2)
SODIUM SERPL-SCNC: 135 MMOL/L (ref 136–145)
WBC # BLD AUTO: 11.96 K/UL (ref 3.9–12.7)

## 2024-12-24 PROCEDURE — 25000003 PHARM REV CODE 250: Performed by: NURSE PRACTITIONER

## 2024-12-24 PROCEDURE — 63600175 PHARM REV CODE 636 W HCPCS

## 2024-12-24 PROCEDURE — 36415 COLL VENOUS BLD VENIPUNCTURE: CPT

## 2024-12-24 PROCEDURE — 25000003 PHARM REV CODE 250

## 2024-12-24 PROCEDURE — 85025 COMPLETE CBC W/AUTO DIFF WBC: CPT

## 2024-12-24 PROCEDURE — 80053 COMPREHEN METABOLIC PANEL: CPT

## 2024-12-24 PROCEDURE — 83735 ASSAY OF MAGNESIUM: CPT

## 2024-12-24 RX ORDER — HYDROCODONE BITARTRATE AND ACETAMINOPHEN 7.5; 325 MG/1; MG/1
1 TABLET ORAL EVERY 6 HOURS PRN
Qty: 20 TABLET | Refills: 0 | Status: ON HOLD | OUTPATIENT
Start: 2024-12-24

## 2024-12-24 RX ORDER — CALCIUM CARBONATE 300MG(750)
400 TABLET,CHEWABLE ORAL 2 TIMES DAILY
Qty: 28 TABLET | Refills: 0 | Status: SHIPPED | OUTPATIENT
Start: 2024-12-24 | End: 2024-12-24

## 2024-12-24 RX ORDER — LANOLIN ALCOHOL/MO/W.PET/CERES
400 CREAM (GRAM) TOPICAL 2 TIMES DAILY
Qty: 28 TABLET | Refills: 0 | Status: ON HOLD | OUTPATIENT
Start: 2024-12-24

## 2024-12-24 RX ORDER — METOCLOPRAMIDE 5 MG/1
5 TABLET ORAL EVERY 8 HOURS PRN
Qty: 90 TABLET | Refills: 0 | Status: ON HOLD | OUTPATIENT
Start: 2024-12-24 | End: 2025-01-23

## 2024-12-24 RX ADMIN — ESCITALOPRAM OXALATE 10 MG: 10 TABLET ORAL at 08:12

## 2024-12-24 RX ADMIN — Medication 16 G: at 08:12

## 2024-12-24 RX ADMIN — ONDANSETRON 4 MG: 2 INJECTION INTRAMUSCULAR; INTRAVENOUS at 06:12

## 2024-12-24 RX ADMIN — PANTOPRAZOLE SODIUM 40 MG: 40 TABLET, DELAYED RELEASE ORAL at 05:12

## 2024-12-24 RX ADMIN — BUSPIRONE HYDROCHLORIDE 15 MG: 5 TABLET ORAL at 08:12

## 2024-12-24 RX ADMIN — HYDROCODONE BITARTRATE AND ACETAMINOPHEN 1 TABLET: 5; 325 TABLET ORAL at 06:12

## 2024-12-24 RX ADMIN — METOCLOPRAMIDE 5 MG: 5 TABLET ORAL at 05:12

## 2024-12-24 RX ADMIN — METOCLOPRAMIDE 5 MG: 5 TABLET ORAL at 11:12

## 2024-12-24 RX ADMIN — METOPROLOL SUCCINATE 50 MG: 50 TABLET, FILM COATED, EXTENDED RELEASE ORAL at 08:12

## 2024-12-24 NOTE — DISCHARGE INSTRUCTIONS
.Our goal at [facility] is to always give you outstanding care and exceptional service. You may receive a survey by mail, text or e-mail in the next 7-10 days from Whit Royal and our leadership team asking about the care you received with us. The survey should only take 5-10 minutes to complete and is very important to us.     Your feedback provides us with a way to recognize our staff who work tirelessly to provide the best care! Also, your responses help us learn how to improve when your experience was below our aspiration of excellence. We WILL use your feedback to continue making improvements to help us provide the highest quality care. We keep your personal information and feedback confidential. We appreciate your time completing this survey and can't wait to hear from you!!!     We want you to leave us today feeling like you can DEFINITELY recommend us to others! We look forward to your continued care with us! Thanks so much for choosing [facility] for your healthcare needs!

## 2024-12-24 NOTE — DISCHARGE SUMMARY
[facility]  Discharge Summary      Patient Name: Jacoby Jean-Baptiste  MRN: [id number]  DAYSI: [id number]  Patient Class: IP- Inpatient  Admission Date: 12/22/2024  Hospital Length of Stay: 2 days  Discharge Date and Time:  12/24/2024 1:30 PM  Attending Physician: Sofie Prasad MD   Discharging Provider: Sofie Prasad MD  Primary Care Provider: Hunter Aguilar III, MD    Primary Care Team: Networked reference to record PCT     HPI:   This is a 49 year gentleman history ulcerative and numerous admissions for the same presents with complaints of nausea nonbloody nonbilious emesis, abdominal pain and hematochezia.  Patient is noted to have moderate leukocytosis and CT imaging consistent with colitis.  Gastroenterology team was consulted for guidance in this patient with recurrent admissions.  On assessment he is calm, cooperative and in no acute distress.  He is hemodynamically stable and complains of abdominal pain.  He reports improvement of his nausea.  He does endorse ongoing hematochezia.  He is afebrile, denies malaise, body aches or upper respiratory symptoms.    * No surgery found *      Hospital Course:   49 year old male with pmhx uc admitted for nausea vomiting and abdominal pain as well an hematochezia. CT with colitis. Admitted to hospital medicine service. GI consulted. Recommended OP flex sig and reglan for gastroparesis. Monitored closely during his stay. Hematochezia improved. Reglan was initiated with  meals.  Patient was not felt to be in UC flare.  Plan was for outpatient follow up with the GI and the continuation of Reglan.  He was also scheduled to his primary care provider on 12/27.  Patient was seen and examined prior to discharge in stable condition.  He had an uneventful stay without any ongoing diarrhea or vomiting.  Use afebrile and hemodynamically stable.  He is appropriate for discharge home.     Goals of Care Treatment Preferences:  Code Status:  "Full Code      SDOH Screening:  The patient was screened for utility difficulties, food insecurity, transport difficulties, housing insecurity, and interpersonal safety and there were no concerns identified this admission.     Consults:   Consults (From admission, onward)          Status Ordering Provider     Inpatient consult to Gastroenterology  Once        Provider:  Enoc Kebede MD    Completed LUCERO CENTENO            * Diabetic gastroparesis  GI following  Gastroparesis diet  Reglan a.c. and HS p.r.n.  Tight glycemic control  Avoid ozempic      Nausea  With emesis(non blood, non bilious), GI on board    Resume home promethazine      DVT (deep venous thrombosis)  Resume Xarelto      PUD (peptic ulcer disease)  Resume PPI      Ulcerative colitis  Does not seem to be in flare, GI consult in abundance of caution since his symptoms were felt to be significant enough for admission, does endorse hematochezia but stable Hb, CT consistent with colitis--clinically chronic although was noted to have interval worsening per radiologist    Needs better expectations set with recurrent admissions, given IV steroids in ED, will not continue unless recommended by GI    No evidence of acute exacerbation at present    We will need GI follow up and flex sig on discharge    Type 2 diabetes mellitus without complication, without long-term current use of insulin  Patient's FSGs are controlled on current medication regimen.  Last A1c reviewed-   Lab Results   Component Value Date    HGBA1C 5.8 11/02/2024     Most recent fingerstick glucose reviewed- No results for input(s): "POCTGLUCOSE" in the last 24 hours.  Current correctional scale  Low  Maintain anti-hyperglycemic dose as follows-   Antihyperglycemics (From admission, onward)      Start     Stop Route Frequency Ordered    12/23/24 1500  insulin aspart U-100 pen 0-5 Units         -- SubQ Before meals & nightly PRN 12/23/24 1400          Hold Oral hypoglycemics while " patient is in the hospital.       History of supraventricular tachycardia  Resume home beta blocker      Morbid obesity  Body mass index is 49.92 kg/m². Morbid obesity complicates all aspects of disease management from diagnostic modalities to treatment. Weight loss encouraged and health benefits explained to patient.     Would recommend not using Ozempic given history of gastroparesis for weight loss      Insomnia  Resume home doxepin        Final Active Diagnoses:    Diagnosis Date Noted POA    PRINCIPAL PROBLEM:  Diabetic gastroparesis [E11.43, K31.84] 12/23/2024 Yes    Anemia [D64.9] 12/23/2024 Yes    Nausea [R11.0] 12/22/2024 Yes    DVT (deep venous thrombosis) [I82.409] 12/06/2024 Yes    PUD (peptic ulcer disease) [K27.9] 04/24/2023 Yes    Ulcerative colitis [K51.90] 01/11/2023 Yes    Type 2 diabetes mellitus without complication, without long-term current use of insulin [E11.9] 01/29/2022 Yes    Morbid obesity [E66.01] 02/03/2021 Yes    Insomnia [G47.00] 02/03/2021 Yes    History of supraventricular tachycardia [Z86.79] 02/03/2021 Not Applicable      Problems Resolved During this Admission:       Discharged Condition: good    Disposition: Home or Self Care    Follow Up:   Follow-up Information       Hunter Aguilar III, MD Follow up on 12/27/2024.    Specialty: Family Medicine  Why: @ 09:40 am  Contact information:  [address] [address] [phone]               Shan Jean III, MD Follow up.    Specialty: Gastroenterology  Why: Patient to call office to schedule hospital follow up  Contact information:  [address] [facility] [address] [phone]                           Patient Instructions:      Ambulatory referral/consult to Outpatient Case Management   Referral Priority: Routine Referral Type: Consultation   Referral Reason: Specialty Services Required   Number of Visits Requested: 1     Diet diabetic     Diet diabetic     Notify your health care provider  if you experience any of the following:  temperature >100.4     Notify your health care provider if you experience any of the following:  persistent nausea and vomiting or diarrhea     Notify your health care provider if you experience any of the following:  severe uncontrolled pain     Notify your health care provider if you experience any of the following:  redness, tenderness, or signs of infection (pain, swelling, redness, odor or green/yellow discharge around incision site)     Notify your health care provider if you experience any of the following:  difficulty breathing or increased cough     Notify your health care provider if you experience any of the following:  severe persistent headache     Notify your health care provider if you experience any of the following:  worsening rash     Notify your health care provider if you experience any of the following:  persistent dizziness, light-headedness, or visual disturbances     Notify your health care provider if you experience any of the following:  increased confusion or weakness     Activity as tolerated       Significant Diagnostic Studies: Labs: BMP:   Recent Labs   Lab 12/23/24  0613 12/24/24  0738   GLU 95 89    135*   K 4.4 4.0    100   CO2 27 28   BUN 18 23*   CREATININE 1.1 1.3   CALCIUM 9.2 9.4   MG 1.6 1.5*    and CBC   Recent Labs   Lab 12/23/24  0613 12/24/24  0738   WBC 14.62* 11.96   HGB 10.3* 10.8*   HCT 33.1* 36.2*    245       Pending Diagnostic Studies:       None           Medications:  Reconciled Home Medications:      Medication List        START taking these medications      HYDROcodone-acetaminophen 7.5-325 mg per tablet  Commonly known as: NORCO  Take 1 tablet by mouth every 6 (six) hours as needed for Pain.     magnesium oxide 400 mg (241.3 mg magnesium) tablet  Commonly known as: MAG-OX  Take 1 tablet (400 mg total) by mouth 2 (two) times daily.     metoclopramide HCl 5 MG tablet  Commonly known as: REGLAN  Take 1  tablet (5 mg total) by mouth every 8 (eight) hours as needed (take as needed before meals).            CONTINUE taking these medications      budesonide 3 mg capsule  Commonly known as: ENTOCORT EC  Take 3 capsules (9 mg total) by mouth once daily.     busPIRone 15 MG tablet  Commonly known as: BUSPAR  Take 1 tablet (15 mg total) by mouth 3 (three) times daily.     colchicine 0.6 mg tablet  Commonly known as: COLCRYS  Take 1 tablet (0.6 mg total) by mouth 2 (two) times daily.     EScitalopram oxalate 10 MG tablet  Commonly known as: LEXAPRO  Take 1 tablet (10 mg total) by mouth once daily.     hyoscyamine 0.375 mg Tb12  Commonly known as: Levbid  Take 0.375 mg by mouth 2 (two) times daily.     LORazepam 0.5 MG tablet  Commonly known as: ATIVAN  Take 1 tablet (0.5 mg total) by mouth every 6 (six) hours as needed for Anxiety.     metoprolol succinate 50 MG 24 hr tablet  Commonly known as: TOPROL-XL  Take 1 tablet (50 mg total) by mouth once daily.     pantoprazole 40 MG tablet  Commonly known as: PROTONIX  Take 40 mg by mouth 2 (two) times daily.     promethazine 25 MG tablet  Commonly known as: PHENERGAN  Take 25 mg by mouth 4 (four) times daily as needed for Nausea.     rivaroxaban 20 mg Tab  Commonly known as: XARELTO  Take 1 tablet (20 mg total) by mouth daily with dinner or evening meal.     simvastatin 20 MG tablet  Commonly known as: ZOCOR  Take 1 tablet (20 mg total) by mouth every evening.     zolpidem 10 mg Tab  Commonly known as: AMBIEN  Take 1 tablet (10 mg total) by mouth nightly as needed (insomnia).            STOP taking these medications      OZEMPIC 0.25 mg or 0.5 mg (2 mg/3 mL) pen injector  Generic drug: semaglutide            ASK your doctor about these medications      sodium zirconium cyclosilicate 5 gram packet  Commonly known as: Lokelma  Take 1 packet (5 g total) by mouth 2 (two) times a day. Mix entire contents of packet(s) into drinking glass containing 3 tablespoons of water; stir well and  drink immediately. Add water and repeat until no powder remains to receive entire dose.              Indwelling Lines/Drains at time of discharge:   Lines/Drains/Airways       None                   Time spent on the discharge of patient: 35 minutes         Sofie Prasad MD  Department of Hospital Medicine  [facility]

## 2024-12-24 NOTE — PLAN OF CARE
Problem: Adult Inpatient Plan of Care  Goal: Plan of Care Review  Outcome: Progressing  Goal: Patient-Specific Goal (Individualized)  Outcome: Progressing  Goal: Absence of Hospital-Acquired Illness or Injury  Outcome: Progressing  Goal: Optimal Comfort and Wellbeing  Outcome: Progressing  Goal: Readiness for Transition of Care  Outcome: Progressing     Problem: Bariatric Environmental Safety  Goal: Safety Maintained with Care  Outcome: Progressing     Problem: Diabetes Comorbidity  Goal: Blood Glucose Level Within Targeted Range  Outcome: Progressing     Problem: Sepsis/Septic Shock  Goal: Optimal Coping  Outcome: Progressing  Goal: Absence of Bleeding  Outcome: Progressing  Goal: Blood Glucose Level Within Targeted Range  Outcome: Progressing  Goal: Absence of Infection Signs and Symptoms  Outcome: Progressing  Goal: Optimal Nutrition Intake  Outcome: Progressing     Problem: Acute Kidney Injury/Impairment  Goal: Fluid and Electrolyte Balance  Outcome: Progressing  Goal: Improved Oral Intake  Outcome: Progressing  Goal: Effective Renal Function  Outcome: Progressing     Problem: Wound  Goal: Optimal Coping  Outcome: Progressing  Goal: Optimal Functional Ability  Outcome: Progressing  Goal: Absence of Infection Signs and Symptoms  Outcome: Progressing  Goal: Improved Oral Intake  Outcome: Progressing  Goal: Optimal Pain Control and Function  Outcome: Progressing  Goal: Skin Health and Integrity  Outcome: Progressing  Goal: Optimal Wound Healing  Outcome: Progressing     Problem: Pain Acute  Goal: Optimal Pain Control and Function  Outcome: Progressing

## 2024-12-24 NOTE — PLAN OF CARE
Patient cleared for discharge by case management to home.        12/24/24 4733   Final Note   Assessment Type Final Discharge Note   Anticipated Discharge Disposition Home   Hospital Resources/Appts/Education Provided Appointments scheduled and added to AVS

## 2024-12-27 ENCOUNTER — PATIENT OUTREACH (OUTPATIENT)
Dept: ADMINISTRATIVE | Facility: OTHER | Age: 49
End: 2024-12-27
Payer: COMMERCIAL

## 2024-12-27 ENCOUNTER — OFFICE VISIT (OUTPATIENT)
Dept: FAMILY MEDICINE | Facility: CLINIC | Age: 49
End: 2024-12-27
Payer: COMMERCIAL

## 2024-12-27 VITALS
BODY MASS INDEX: 48.45 KG/M2 | DIASTOLIC BLOOD PRESSURE: 68 MMHG | HEART RATE: 80 BPM | SYSTOLIC BLOOD PRESSURE: 118 MMHG | WEIGHT: 290.81 LBS | HEIGHT: 65 IN | OXYGEN SATURATION: 97 % | TEMPERATURE: 98 F

## 2024-12-27 DIAGNOSIS — K50.919 CROHN'S DISEASE WITH COMPLICATION, UNSPECIFIED GASTROINTESTINAL TRACT LOCATION: Primary | ICD-10-CM

## 2024-12-27 DIAGNOSIS — F41.9 ANXIETY: ICD-10-CM

## 2024-12-27 DIAGNOSIS — E11.9 TYPE 2 DIABETES MELLITUS WITHOUT COMPLICATION, WITHOUT LONG-TERM CURRENT USE OF INSULIN: ICD-10-CM

## 2024-12-27 DIAGNOSIS — K31.84 GASTROPARESIS: ICD-10-CM

## 2024-12-27 DIAGNOSIS — G47.00 INSOMNIA, UNSPECIFIED TYPE: ICD-10-CM

## 2024-12-27 PROCEDURE — 1160F RVW MEDS BY RX/DR IN RCRD: CPT | Mod: CPTII,S$GLB,, | Performed by: FAMILY MEDICINE

## 2024-12-27 PROCEDURE — 99214 OFFICE O/P EST MOD 30 MIN: CPT | Mod: S$GLB,,, | Performed by: FAMILY MEDICINE

## 2024-12-27 PROCEDURE — 1111F DSCHRG MED/CURRENT MED MERGE: CPT | Mod: CPTII,S$GLB,, | Performed by: FAMILY MEDICINE

## 2024-12-27 PROCEDURE — 3008F BODY MASS INDEX DOCD: CPT | Mod: CPTII,S$GLB,, | Performed by: FAMILY MEDICINE

## 2024-12-27 PROCEDURE — 3078F DIAST BP <80 MM HG: CPT | Mod: CPTII,S$GLB,, | Performed by: FAMILY MEDICINE

## 2024-12-27 PROCEDURE — 3066F NEPHROPATHY DOC TX: CPT | Mod: CPTII,S$GLB,, | Performed by: FAMILY MEDICINE

## 2024-12-27 PROCEDURE — 99999 PR PBB SHADOW E&M-EST. PATIENT-LVL IV: CPT | Mod: PBBFAC,,, | Performed by: FAMILY MEDICINE

## 2024-12-27 PROCEDURE — 3061F NEG MICROALBUMINURIA REV: CPT | Mod: CPTII,S$GLB,, | Performed by: FAMILY MEDICINE

## 2024-12-27 PROCEDURE — 3074F SYST BP LT 130 MM HG: CPT | Mod: CPTII,S$GLB,, | Performed by: FAMILY MEDICINE

## 2024-12-27 PROCEDURE — 1159F MED LIST DOCD IN RCRD: CPT | Mod: CPTII,S$GLB,, | Performed by: FAMILY MEDICINE

## 2024-12-27 PROCEDURE — 3044F HG A1C LEVEL LT 7.0%: CPT | Mod: CPTII,S$GLB,, | Performed by: FAMILY MEDICINE

## 2024-12-27 RX ORDER — ZOLPIDEM TARTRATE 10 MG/1
10 TABLET ORAL NIGHTLY PRN
Qty: 30 TABLET | Refills: 2 | Status: ON HOLD | OUTPATIENT
Start: 2024-12-27 | End: 2025-06-27

## 2024-12-27 RX ORDER — ESCITALOPRAM OXALATE 20 MG/1
20 TABLET ORAL DAILY
Qty: 90 TABLET | Refills: 0 | Status: ON HOLD | OUTPATIENT
Start: 2024-12-27

## 2024-12-27 NOTE — PROGRESS NOTES
Questionnaire with patient/caregiver via telephone today.  Patient denied any SDOH needs at this time.

## 2024-12-28 PROBLEM — K50.919 CROHN'S DISEASE WITH COMPLICATION: Status: ACTIVE | Noted: 2024-12-28

## 2024-12-28 NOTE — PROGRESS NOTES
Subjective:       Patient ID: Jacoby Jean-Baptiste is a 49 y.o. male.    Chief Complaint: Follow-up (Hospital Follow Up)    Hospital follow-up.  Gastroparesis.  And had further GI bleeding.  In the hospital twice.  Took 1 dose of Ozempic.  Had tried it previously in June apparently had some gastroparesis then he was unaware of.  Made him sick started vomiting was in the hospital the December 22nd through the 24th.  Crohn's is not really doing any better.  On Reglan now.  Took the Ozempic 9 days ago.  Sees surgery next month to discuss colectomy.  On new infusion started on December 1st hoping it will calm the Crohn's down.  Type 2 diabetes sugar is doing okay.  BMI of 48 then 10 lb.  No cardiac issues no chest pain or palpitations.  Anxiety is slightly better with the Lexapro 10 mg would like to increase the dose.  Insomnia needs refill of Ambien.  Takes the full 10 mg HS.  Off of oral steroids now.    Physical examination.  Vital signs noted.  Overweight male no acute distress.  Fatty lump on upper back.  Neck without bruit.  Chest clear.  Heart regular rate rhythm.  Abdomen bowel sounds are positive soft but slightly tender no rebound.  Extremities without edema.        Objective:        Assessment:       1. Crohn's disease with complication, unspecified gastrointestinal tract location    2. Gastroparesis    3. BMI 45.0-49.9, adult    4. Anxiety    5. Insomnia, unspecified type    6. Type 2 diabetes mellitus without complication, without long-term current use of insulin        Plan:       Crohn's disease with complication, unspecified gastrointestinal tract location    Gastroparesis    BMI 45.0-49.9, adult    Anxiety    Insomnia, unspecified type  -     zolpidem (AMBIEN) 10 mg Tab; Take 1 tablet (10 mg total) by mouth nightly as needed (insomnia).  Dispense: 30 tablet; Refill: 2    Type 2 diabetes mellitus without complication, without long-term current use of insulin    Other orders  -     EScitalopram oxalate  (LEXAPRO) 20 MG tablet; Take 1 tablet (20 mg total) by mouth once daily.  Dispense: 90 tablet; Refill: 0    Increase Lexapro to 20 mg daily 90 pills.  Follow-up in a month.  Ambien 10 mg HS 30 with 2 refills.  Stay off the G LP 1.  Use the Reglan.  Follow-up with gastroenterology.  See surgeon at main campus regarding possible colectomy as planned.

## 2024-12-30 LAB
OHS QRS DURATION: 74 MS
OHS QTC CALCULATION: 440 MS

## 2024-12-31 ENCOUNTER — HOSPITAL ENCOUNTER (INPATIENT)
Facility: HOSPITAL | Age: 49
LOS: 7 days | Discharge: HOME | DRG: 372 | End: 2025-01-09
Attending: EMERGENCY MEDICINE | Admitting: INTERNAL MEDICINE
Payer: COMMERCIAL

## 2024-12-31 DIAGNOSIS — K52.9 COLITIS: ICD-10-CM

## 2024-12-31 DIAGNOSIS — A04.72 C. DIFFICILE COLITIS: Primary | ICD-10-CM

## 2024-12-31 DIAGNOSIS — R07.9 CHEST PAIN: ICD-10-CM

## 2024-12-31 DIAGNOSIS — K51.90 ULCERATIVE COLITIS WITHOUT COMPLICATIONS, UNSPECIFIED LOCATION: ICD-10-CM

## 2024-12-31 PROBLEM — D72.829 LEUKOCYTOSIS: Status: ACTIVE | Noted: 2024-12-31

## 2024-12-31 LAB
ALBUMIN SERPL BCP-MCNC: 3.9 G/DL (ref 3.5–5.2)
ALP SERPL-CCNC: 71 U/L (ref 55–135)
ALT SERPL W/O P-5'-P-CCNC: 8 U/L (ref 10–44)
ANION GAP SERPL CALC-SCNC: 8 MMOL/L (ref 8–16)
AST SERPL-CCNC: 10 U/L (ref 10–40)
ASTROVIRUS: NOT DETECTED
BASOPHILS # BLD AUTO: 0.06 K/UL (ref 0–0.2)
BASOPHILS NFR BLD: 0.4 % (ref 0–1.9)
BILIRUB SERPL-MCNC: 0.3 MG/DL (ref 0.1–1)
BILIRUB UR QL STRIP: NEGATIVE
BUN SERPL-MCNC: 12 MG/DL (ref 6–20)
C COLI+JEJ+UPSA DNA STL QL NAA+NON-PROBE: NOT DETECTED
CALCIUM SERPL-MCNC: 9.4 MG/DL (ref 8.7–10.5)
CHLORIDE SERPL-SCNC: 99 MMOL/L (ref 95–110)
CLARITY UR: CLEAR
CO2 SERPL-SCNC: 27 MMOL/L (ref 23–29)
COLOR UR: YELLOW
CREAT SERPL-MCNC: 1.1 MG/DL (ref 0.5–1.4)
CRP SERPL-MCNC: 2.8 MG/DL
CYCLOSPORA CAYETANENSIS: NOT DETECTED
DIFFERENTIAL METHOD BLD: ABNORMAL
ENTEROAGGREGATIVE E COLI: NOT DETECTED
ENTEROPATHOGENIC E COLI: NOT DETECTED
EOSINOPHIL # BLD AUTO: 0.3 K/UL (ref 0–0.5)
EOSINOPHIL NFR BLD: 1.9 % (ref 0–8)
ERYTHROCYTE [DISTWIDTH] IN BLOOD BY AUTOMATED COUNT: 17.8 % (ref 11.5–14.5)
EST. GFR  (NO RACE VARIABLE): >60 ML/MIN/1.73 M^2
GLUCOSE SERPL-MCNC: 105 MG/DL (ref 70–110)
GLUCOSE UR QL STRIP: NEGATIVE
GPP - ADENOVIRUS 40/41: NOT DETECTED
GPP - CRYPTOSPORIDIUM: NOT DETECTED
GPP - ENTAMOEBA HISTOLYTICA: NOT DETECTED
GPP - ENTEROTOXIGENIC E COLI (ETEC): NOT DETECTED
GPP - GIARDIA LAMBLIA: NOT DETECTED
GPP - NOROVIRUS GI/GII: NOT DETECTED
GPP - ROTAVIRUS A: NOT DETECTED
GPP - SALMONELLA: NOT DETECTED
GPP - VIBRIO CHOLERA: NOT DETECTED
GPP - YERSINIA ENTEROCOLITICA: NOT DETECTED
HCT VFR BLD AUTO: 35 % (ref 40–54)
HGB BLD-MCNC: 10.8 G/DL (ref 14–18)
HGB UR QL STRIP: ABNORMAL
IMM GRANULOCYTES # BLD AUTO: 0.08 K/UL (ref 0–0.04)
IMM GRANULOCYTES NFR BLD AUTO: 0.6 % (ref 0–0.5)
KETONES UR QL STRIP: NEGATIVE
LACTATE PLASV-SCNC: NOT DETECTED MMOL/L
LEUKOCYTE ESTERASE UR QL STRIP: NEGATIVE
LIPASE SERPL-CCNC: 4 U/L (ref 4–60)
LYMPHOCYTES # BLD AUTO: 1.3 K/UL (ref 1–4.8)
LYMPHOCYTES NFR BLD: 9.4 % (ref 18–48)
MAGNESIUM SERPL-MCNC: 1.7 MG/DL (ref 1.6–2.6)
MCH RBC QN AUTO: 26.6 PG (ref 27–31)
MCHC RBC AUTO-ENTMCNC: 30.9 G/DL (ref 32–36)
MCV RBC AUTO: 86 FL (ref 82–98)
MONOCYTES # BLD AUTO: 1.4 K/UL (ref 0.3–1)
MONOCYTES NFR BLD: 10.1 % (ref 4–15)
NEUTROPHILS # BLD AUTO: 10.5 K/UL (ref 1.8–7.7)
NEUTROPHILS NFR BLD: 77.6 % (ref 38–73)
NITRITE UR QL STRIP: NEGATIVE
NRBC BLD-RTO: 0 /100 WBC
PH UR STRIP: 6 [PH] (ref 5–8)
PLATELET # BLD AUTO: 322 K/UL (ref 150–450)
PLESIOMONAS SHIGELLOIDES: NOT DETECTED
PMV BLD AUTO: 10.8 FL (ref 9.2–12.9)
POTASSIUM SERPL-SCNC: 3.6 MMOL/L (ref 3.5–5.1)
PROT SERPL-MCNC: 7 G/DL (ref 6–8.4)
PROT UR QL STRIP: NEGATIVE
RBC # BLD AUTO: 4.06 M/UL (ref 4.6–6.2)
SAPOVIRUS: NOT DETECTED
SHIGELLA SP+EIEC IPAH STL QL NAA+PROBE: NOT DETECTED
SODIUM SERPL-SCNC: 134 MMOL/L (ref 136–145)
SP GR UR STRIP: >1.03 (ref 1–1.03)
URN SPEC COLLECT METH UR: ABNORMAL
UROBILINOGEN UR STRIP-ACNC: NEGATIVE EU/DL
VIBRIO: NOT DETECTED
WBC # BLD AUTO: 13.5 K/UL (ref 3.9–12.7)

## 2024-12-31 PROCEDURE — 96375 TX/PRO/DX INJ NEW DRUG ADDON: CPT

## 2024-12-31 PROCEDURE — 80053 COMPREHEN METABOLIC PANEL: CPT | Performed by: PHYSICIAN ASSISTANT

## 2024-12-31 PROCEDURE — 25000003 PHARM REV CODE 250: Performed by: HOSPITALIST

## 2024-12-31 PROCEDURE — 25500020 PHARM REV CODE 255: Performed by: EMERGENCY MEDICINE

## 2024-12-31 PROCEDURE — 63600175 PHARM REV CODE 636 W HCPCS: Performed by: HOSPITALIST

## 2024-12-31 PROCEDURE — 83735 ASSAY OF MAGNESIUM: CPT | Performed by: PHYSICIAN ASSISTANT

## 2024-12-31 PROCEDURE — G0378 HOSPITAL OBSERVATION PER HR: HCPCS

## 2024-12-31 PROCEDURE — 99285 EMERGENCY DEPT VISIT HI MDM: CPT | Mod: 25

## 2024-12-31 PROCEDURE — 85025 COMPLETE CBC W/AUTO DIFF WBC: CPT | Performed by: PHYSICIAN ASSISTANT

## 2024-12-31 PROCEDURE — 63600175 PHARM REV CODE 636 W HCPCS: Performed by: EMERGENCY MEDICINE

## 2024-12-31 PROCEDURE — 36415 COLL VENOUS BLD VENIPUNCTURE: CPT | Performed by: PHYSICIAN ASSISTANT

## 2024-12-31 PROCEDURE — 96374 THER/PROPH/DIAG INJ IV PUSH: CPT

## 2024-12-31 PROCEDURE — 86140 C-REACTIVE PROTEIN: CPT | Performed by: PHYSICIAN ASSISTANT

## 2024-12-31 PROCEDURE — 83690 ASSAY OF LIPASE: CPT | Performed by: PHYSICIAN ASSISTANT

## 2024-12-31 PROCEDURE — 87507 IADNA-DNA/RNA PROBE TQ 12-25: CPT | Performed by: HOSPITALIST

## 2024-12-31 PROCEDURE — 81003 URINALYSIS AUTO W/O SCOPE: CPT | Performed by: PHYSICIAN ASSISTANT

## 2024-12-31 RX ORDER — METOPROLOL SUCCINATE 50 MG/1
50 TABLET, EXTENDED RELEASE ORAL DAILY
Status: DISCONTINUED | OUTPATIENT
Start: 2025-01-01 | End: 2025-01-09 | Stop reason: HOSPADM

## 2024-12-31 RX ORDER — LANOLIN ALCOHOL/MO/W.PET/CERES
800 CREAM (GRAM) TOPICAL
Status: DISCONTINUED | OUTPATIENT
Start: 2024-12-31 | End: 2025-01-07

## 2024-12-31 RX ORDER — METRONIDAZOLE 500 MG/100ML
500 INJECTION, SOLUTION INTRAVENOUS
Status: DISCONTINUED | OUTPATIENT
Start: 2024-12-31 | End: 2024-12-31

## 2024-12-31 RX ORDER — ENOXAPARIN SODIUM 100 MG/ML
40 INJECTION SUBCUTANEOUS EVERY 12 HOURS
Status: DISCONTINUED | OUTPATIENT
Start: 2025-01-01 | End: 2025-01-03

## 2024-12-31 RX ORDER — ALUMINUM HYDROXIDE, MAGNESIUM HYDROXIDE, AND SIMETHICONE 1200; 120; 1200 MG/30ML; MG/30ML; MG/30ML
30 SUSPENSION ORAL 4 TIMES DAILY PRN
Status: DISCONTINUED | OUTPATIENT
Start: 2024-12-31 | End: 2025-01-09 | Stop reason: HOSPADM

## 2024-12-31 RX ORDER — PANTOPRAZOLE SODIUM 40 MG/1
40 TABLET, DELAYED RELEASE ORAL 2 TIMES DAILY
Status: DISCONTINUED | OUTPATIENT
Start: 2024-12-31 | End: 2025-01-02

## 2024-12-31 RX ORDER — MORPHINE SULFATE 4 MG/ML
4 INJECTION, SOLUTION INTRAMUSCULAR; INTRAVENOUS
Status: COMPLETED | OUTPATIENT
Start: 2024-12-31 | End: 2024-12-31

## 2024-12-31 RX ORDER — CIPROFLOXACIN 2 MG/ML
400 INJECTION, SOLUTION INTRAVENOUS
Status: COMPLETED | OUTPATIENT
Start: 2024-12-31 | End: 2024-12-31

## 2024-12-31 RX ORDER — TALC
6 POWDER (GRAM) TOPICAL NIGHTLY PRN
Status: DISCONTINUED | OUTPATIENT
Start: 2024-12-31 | End: 2025-01-09 | Stop reason: HOSPADM

## 2024-12-31 RX ORDER — HYOSCYAMINE SULFATE 0.38 MG/1
0.38 TABLET, EXTENDED RELEASE ORAL 2 TIMES DAILY
Status: DISCONTINUED | OUTPATIENT
Start: 2024-12-31 | End: 2024-12-31

## 2024-12-31 RX ORDER — SODIUM,POTASSIUM PHOSPHATES 280-250MG
2 POWDER IN PACKET (EA) ORAL
Status: DISCONTINUED | OUTPATIENT
Start: 2024-12-31 | End: 2025-01-09 | Stop reason: HOSPADM

## 2024-12-31 RX ORDER — METOCLOPRAMIDE HYDROCHLORIDE 5 MG/ML
10 INJECTION INTRAMUSCULAR; INTRAVENOUS
Status: COMPLETED | OUTPATIENT
Start: 2024-12-31 | End: 2024-12-31

## 2024-12-31 RX ORDER — ESCITALOPRAM OXALATE 10 MG/1
20 TABLET ORAL DAILY
Status: DISCONTINUED | OUTPATIENT
Start: 2025-01-01 | End: 2025-01-09 | Stop reason: HOSPADM

## 2024-12-31 RX ORDER — LORAZEPAM 0.5 MG/1
0.5 TABLET ORAL EVERY 6 HOURS PRN
Status: DISCONTINUED | OUTPATIENT
Start: 2024-12-31 | End: 2025-01-03

## 2024-12-31 RX ORDER — SODIUM CHLORIDE 9 MG/ML
INJECTION, SOLUTION INTRAVENOUS CONTINUOUS
Status: DISCONTINUED | OUTPATIENT
Start: 2024-12-31 | End: 2025-01-03

## 2024-12-31 RX ORDER — ONDANSETRON HYDROCHLORIDE 2 MG/ML
4 INJECTION, SOLUTION INTRAVENOUS
Status: COMPLETED | OUTPATIENT
Start: 2024-12-31 | End: 2024-12-31

## 2024-12-31 RX ORDER — IBUPROFEN 200 MG
24 TABLET ORAL
Status: DISCONTINUED | OUTPATIENT
Start: 2024-12-31 | End: 2025-01-09 | Stop reason: HOSPADM

## 2024-12-31 RX ORDER — METRONIDAZOLE 500 MG/100ML
500 INJECTION, SOLUTION INTRAVENOUS
Status: DISCONTINUED | OUTPATIENT
Start: 2025-01-01 | End: 2024-12-31

## 2024-12-31 RX ORDER — ZOLPIDEM TARTRATE 5 MG/1
10 TABLET ORAL NIGHTLY PRN
Status: DISCONTINUED | OUTPATIENT
Start: 2024-12-31 | End: 2025-01-09 | Stop reason: HOSPADM

## 2024-12-31 RX ORDER — GLUCAGON 1 MG
1 KIT INJECTION
Status: DISCONTINUED | OUTPATIENT
Start: 2024-12-31 | End: 2025-01-09 | Stop reason: HOSPADM

## 2024-12-31 RX ORDER — CEFTRIAXONE 1 G/1
1 INJECTION, POWDER, FOR SOLUTION INTRAMUSCULAR; INTRAVENOUS
Status: DISCONTINUED | OUTPATIENT
Start: 2024-12-31 | End: 2024-12-31

## 2024-12-31 RX ORDER — ONDANSETRON HYDROCHLORIDE 2 MG/ML
4 INJECTION, SOLUTION INTRAVENOUS EVERY 6 HOURS PRN
Status: DISCONTINUED | OUTPATIENT
Start: 2024-12-31 | End: 2025-01-03

## 2024-12-31 RX ORDER — ENOXAPARIN SODIUM 100 MG/ML
40 INJECTION SUBCUTANEOUS EVERY 24 HOURS
Status: DISCONTINUED | OUTPATIENT
Start: 2025-01-01 | End: 2024-12-31

## 2024-12-31 RX ORDER — SODIUM CHLORIDE 0.9 % (FLUSH) 0.9 %
2 SYRINGE (ML) INJECTION EVERY 12 HOURS PRN
Status: DISCONTINUED | OUTPATIENT
Start: 2024-12-31 | End: 2025-01-09 | Stop reason: HOSPADM

## 2024-12-31 RX ORDER — HYOSCYAMINE SULFATE 0.12 MG/1
0.25 TABLET SUBLINGUAL
Status: DISCONTINUED | OUTPATIENT
Start: 2025-01-01 | End: 2025-01-09 | Stop reason: HOSPADM

## 2024-12-31 RX ORDER — ACETAMINOPHEN 325 MG/1
650 TABLET ORAL EVERY 8 HOURS PRN
Status: DISCONTINUED | OUTPATIENT
Start: 2024-12-31 | End: 2025-01-09 | Stop reason: HOSPADM

## 2024-12-31 RX ORDER — BUSPIRONE HYDROCHLORIDE 5 MG/1
15 TABLET ORAL 3 TIMES DAILY
Status: DISCONTINUED | OUTPATIENT
Start: 2024-12-31 | End: 2025-01-09 | Stop reason: HOSPADM

## 2024-12-31 RX ORDER — ACETAMINOPHEN 325 MG/1
650 TABLET ORAL EVERY 4 HOURS PRN
Status: DISCONTINUED | OUTPATIENT
Start: 2024-12-31 | End: 2025-01-09 | Stop reason: HOSPADM

## 2024-12-31 RX ORDER — IBUPROFEN 200 MG
16 TABLET ORAL
Status: DISCONTINUED | OUTPATIENT
Start: 2024-12-31 | End: 2025-01-09 | Stop reason: HOSPADM

## 2024-12-31 RX ORDER — NALOXONE HCL 0.4 MG/ML
0.02 VIAL (ML) INJECTION
Status: DISCONTINUED | OUTPATIENT
Start: 2024-12-31 | End: 2025-01-09 | Stop reason: HOSPADM

## 2024-12-31 RX ORDER — ATORVASTATIN CALCIUM 40 MG/1
40 TABLET, FILM COATED ORAL NIGHTLY
Status: DISCONTINUED | OUTPATIENT
Start: 2025-01-01 | End: 2025-01-09 | Stop reason: HOSPADM

## 2024-12-31 RX ORDER — HYDROCODONE BITARTRATE AND ACETAMINOPHEN 5; 325 MG/1; MG/1
1 TABLET ORAL EVERY 6 HOURS PRN
Status: DISCONTINUED | OUTPATIENT
Start: 2024-12-31 | End: 2025-01-09 | Stop reason: HOSPADM

## 2024-12-31 RX ORDER — DIPHENHYDRAMINE HYDROCHLORIDE 50 MG/ML
25 INJECTION INTRAMUSCULAR; INTRAVENOUS
Status: COMPLETED | OUTPATIENT
Start: 2024-12-31 | End: 2024-12-31

## 2024-12-31 RX ADMIN — CIPROFLOXACIN 400 MG: 2 INJECTION, SOLUTION INTRAVENOUS at 06:12

## 2024-12-31 RX ADMIN — DIPHENHYDRAMINE HYDROCHLORIDE 25 MG: 50 INJECTION INTRAMUSCULAR; INTRAVENOUS at 04:12

## 2024-12-31 RX ADMIN — ONDANSETRON 4 MG: 2 INJECTION INTRAMUSCULAR; INTRAVENOUS at 06:12

## 2024-12-31 RX ADMIN — HYDROCODONE BITARTRATE AND ACETAMINOPHEN 1 TABLET: 5; 325 TABLET ORAL at 09:12

## 2024-12-31 RX ADMIN — MORPHINE SULFATE 4 MG: 4 INJECTION, SOLUTION INTRAMUSCULAR; INTRAVENOUS at 05:12

## 2024-12-31 RX ADMIN — IOHEXOL 100 ML: 350 INJECTION, SOLUTION INTRAVENOUS at 02:12

## 2024-12-31 RX ADMIN — METRONIDAZOLE 500 MG: 500 INJECTION, SOLUTION INTRAVENOUS at 05:12

## 2024-12-31 RX ADMIN — ZOLPIDEM TARTRATE 10 MG: 5 TABLET, COATED ORAL at 11:12

## 2024-12-31 RX ADMIN — BUSPIRONE HYDROCHLORIDE 15 MG: 5 TABLET ORAL at 07:12

## 2024-12-31 RX ADMIN — ONDANSETRON 4 MG: 2 INJECTION INTRAMUSCULAR; INTRAVENOUS at 02:12

## 2024-12-31 RX ADMIN — PANTOPRAZOLE SODIUM 40 MG: 40 TABLET, DELAYED RELEASE ORAL at 07:12

## 2024-12-31 RX ADMIN — CEFTRIAXONE 1 G: 1 INJECTION, POWDER, FOR SOLUTION INTRAMUSCULAR; INTRAVENOUS at 08:12

## 2024-12-31 RX ADMIN — METOCLOPRAMIDE 10 MG: 5 INJECTION, SOLUTION INTRAMUSCULAR; INTRAVENOUS at 04:12

## 2024-12-31 RX ADMIN — RIVAROXABAN 20 MG: 20 TABLET, FILM COATED ORAL at 07:12

## 2024-12-31 RX ADMIN — SODIUM CHLORIDE: 9 INJECTION, SOLUTION INTRAVENOUS at 09:12

## 2024-12-31 NOTE — HPI
50 yo male with PMH of ulcerative colitis presented because of worsening abdominal pain and diarrhea.  According to the patient, he has a h/o on and off diarrhea and abdominal cramp related to his ulcerative colitis.  He was recently admitted on 12/22 because of abdominal pain/nausea/vomiting/hematochezia.  At that time, his CT showed pan colitis, was seen by GI who suggested that his symptoms are likely not secondary to ulcerative colitis flare.  After his discharge, he started to have perfused diarrhea, and abdominal cramps/pain.  He is having anywhere between 10-15 bowel movements a day, some of them were hematochezia.  His abdominal pain is described as crampy, severe, 10/10 on pain scale.  He has been having chills with no fever. As a result, he decided to come back to the ER for evaluation.    In the ER, vitals showed blood pressure of 126/83, heart rate of 84, respiratory rate of 18, afebrile satting 99% on room air.  CBC with white count of 13.5, and hemoglobin of 10.8.  CBC with sodium of 134.  CRP is mildly elevated at 2.8. CT abdomen/pelvis showed findings compatible with acute colitis slightly worsened when compared to December 22, 2024. No evidence of perforation or pneumatosis.  He was given Reglan, Benadryl and Zofran.  He will be admitted to Medicine for further management.

## 2024-12-31 NOTE — ASSESSMENT & PLAN NOTE
Resume Xarelto.  Patient's hemoglobin is as baseline, and he has hypercoagulable state with his history of ulcerative colitis.

## 2024-12-31 NOTE — ASSESSMENT & PLAN NOTE
Body mass index is 48.76 kg/m². Morbid obesity complicates all aspects of disease management from diagnostic modalities to treatment. Weight loss encouraged and health benefits explained to patient.

## 2024-12-31 NOTE — ED PROVIDER NOTES
Encounter Date: 12/31/2024       History     Chief Complaint   Patient presents with    Abdominal Pain     Reports history of UC     Vomiting    Diarrhea     49-year-old male with a past medical history of ulcerative colitis, hypertension, diabetes mellitus, and hyperlipidemia presents abdominal pain, bloody stools, nausea/vomiting.  He reports a recent hospitalization, when he was discharged 1 week ago for suspected gastroparesis.  He reports that he has not felt any better after going home and has had continued symptoms.  He reports compliance with his home Reglan.  There is no reported fever, chest pain, or trouble breathing.  There are no alleviating or aggravating factors.      Review of patient's allergies indicates:  No Known Allergies  Past Medical History:   Diagnosis Date    C. difficile colitis     Cavitary pneumonia 11/2023    pos aspergillus serology    Diabetes mellitus 01/2022    Hyperlipidemia 2015    Hypertension 2015    Insomnia 2015    Ulcerative colitis      Past Surgical History:   Procedure Laterality Date    BRONCHOSCOPY WITH FLUOROSCOPY Left 11/20/2023    Procedure: BRONCHOSCOPY, WITH FLUOROSCOPY;  Surgeon: Lisa Villarreal MD;  Location: [facility];  Service: Pulmonary;  Laterality: Left;    BRONCHOSCOPY WITH FLUOROSCOPY N/A 11/30/2023    Procedure: BRONCHOSCOPY, WITH FLUOROSCOPY;  Surgeon: Lisa Villarreal MD;  Location: [facility];  Service: Pulmonary;  Laterality: N/A;    CARDIAC ELECTROPHYSIOLOGY MAPPING AND ABLATION  2017    SVT    CHOLECYSTECTOMY  2011    COLONOSCOPY N/A 5/3/2022    Procedure: COLONOSCOPY;  Surgeon: Shan Jean III, MD;  Location: [facility];  Service: Endoscopy;  Laterality: N/A;    COLONOSCOPY N/A 3/16/2024    Procedure: COLONOSCOPY;  Surgeon: ALEKSANDER Ramos MD;  Location: [facility];  Service: Endoscopy;  Laterality: N/A;    COLONOSCOPY WITH FECAL MICROBIOTA TRANSFER N/A 3/21/2023    Procedure: COLONOSCOPY, WITH FECAL MICROBIOTA TRANSFER;  Surgeon:  David Millan MD;  Location: [facility];  Service: Endoscopy;  Laterality: N/A;    ESOPHAGOGASTRODUODENOSCOPY N/A 4/24/2023    Procedure: EGD (ESOPHAGOGASTRODUODENOSCOPY);  Surgeon: Shan Jean III, MD;  Location: [facility];  Service: Endoscopy;  Laterality: N/A;    ESOPHAGOGASTRODUODENOSCOPY N/A 6/5/2024    Procedure: EGD (ESOPHAGOGASTRODUODENOSCOPY);  Surgeon: Odalis Mccabe MD;  Location: [facility];  Service: Endoscopy;  Laterality: N/A;    FLEXIBLE SIGMOIDOSCOPY N/A 6/5/2024    Procedure: SIGMOIDOSCOPY, FLEXIBLE;  Surgeon: Odalis Mccabe MD;  Location: [facility];  Service: Endoscopy;  Laterality: N/A;    FLEXIBLE SIGMOIDOSCOPY N/A 7/16/2024    Procedure: SIGMOIDOSCOPY, FLEXIBLE;  Surgeon: Shan Jean III, MD;  Location: [facility];  Service: Endoscopy;  Laterality: N/A;    FLEXIBLE SIGMOIDOSCOPY N/A 8/13/2024    Procedure: SIGMOIDOSCOPY, FLEXIBLE;  Surgeon: Shan Jean III, MD;  Location: [facility];  Service: Endoscopy;  Laterality: N/A;    GANGLION CYST EXCISION Left 1992    UMBILICAL HERNIA REPAIR  2011    with mesh     Family History   Problem Relation Name Age of Onset    Asthma Mother       Social History     Tobacco Use    Smoking status: Former     Average packs/day: 1 pack/day for 30.0 years (30.0 ttl pk-yrs)     Types: Cigarettes     Start date: 1993    Smokeless tobacco: Never    Tobacco comments:     Pt states he has stopped smoking with Chantix three weeks ago. 12/1/23   Substance Use Topics    Alcohol use: Yes     Comment: ocass    Drug use: Not Currently     Review of Systems   Constitutional:  Negative for chills, diaphoresis, fatigue and fever.   HENT:  Negative for congestion and rhinorrhea.    Respiratory:  Negative for cough and shortness of breath.    Cardiovascular:  Negative for chest pain.   Gastrointestinal:  Positive for abdominal pain, blood in stool, diarrhea, nausea and vomiting.   Genitourinary:  Negative for dysuria, frequency and testicular pain.    Musculoskeletal:  Negative for gait problem.   Skin:  Negative for color change.   Neurological:  Negative for dizziness and numbness.   Psychiatric/Behavioral:  Negative for agitation and confusion.        Physical Exam     Initial Vitals [12/31/24 1023]   BP Pulse Resp Temp SpO2   126/83 84 18 98.1 °F (36.7 °C) 99 %      MAP       --         Physical Exam    Nursing note and vitals reviewed.  Constitutional: He appears well-developed and well-nourished.   HENT:   Head: Normocephalic and atraumatic.   Eyes: EOM are normal. Pupils are equal, round, and reactive to light.   Neck: Neck supple.   Cardiovascular:  Normal rate and regular rhythm.           Pulmonary/Chest: Breath sounds normal.   Abdominal: Abdomen is soft. Bowel sounds are normal. He exhibits no distension. There is abdominal tenderness.   Left lower quadrant tenderness to palpation.  No rebound or guarding noted. There is no rebound and no guarding.   Musculoskeletal:         General: Normal range of motion.      Cervical back: Neck supple.     Neurological: He is alert and oriented to person, place, and time.   Skin: Skin is warm and dry.   Psychiatric: He has a normal mood and affect.         ED Course   Critical Care    Date/Time: 12/31/2024 5:40 PM    Performed by: Fahad Rodríguez MD  Authorized by: Fahad Rodríguez MD  Direct patient critical care time: 13 minutes  Ordering / reviewing critical care time: 15 minutes  Documentation critical care time: 11 minutes  Consulting other physicians critical care time: 10 minutes  Total critical care time (exclusive of procedural time) : 49 minutes  Critical care was time spent personally by me on the following activities: development of treatment plan with patient or surrogate, evaluation of patient's response to treatment, examination of patient, obtaining history from patient or surrogate, ordering and review of laboratory studies, ordering and review of radiographic studies and ordering and  performing treatments and interventions.        Labs Reviewed   CBC W/ AUTO DIFFERENTIAL - Abnormal       Result Value    WBC 13.50 (*)     RBC 4.06 (*)     Hemoglobin 10.8 (*)     Hematocrit 35.0 (*)     MCV 86      MCH 26.6 (*)     MCHC 30.9 (*)     RDW 17.8 (*)     Platelets 322      MPV 10.8      Immature Granulocytes 0.6 (*)     Gran # (ANC) 10.5 (*)     Immature Grans (Abs) 0.08 (*)     Lymph # 1.3      Mono # 1.4 (*)     Eos # 0.3      Baso # 0.06      nRBC 0      Gran % 77.6 (*)     Lymph % 9.4 (*)     Mono % 10.1      Eosinophil % 1.9      Basophil % 0.4      Differential Method Automated     COMPREHENSIVE METABOLIC PANEL - Abnormal    Sodium 134 (*)     Potassium 3.6      Chloride 99      CO2 27      Glucose 105      BUN 12      Creatinine 1.1      Calcium 9.4      Total Protein 7.0      Albumin 3.9      Total Bilirubin 0.3      Alkaline Phosphatase 71      AST 10      ALT 8 (*)     eGFR >60.0      Anion Gap 8     C-REACTIVE PROTEIN - Abnormal    CRP 2.80 (*)    LIPASE    Lipase 4     MAGNESIUM   MAGNESIUM    Magnesium 1.7     C-REACTIVE PROTEIN   URINALYSIS, REFLEX TO URINE CULTURE   GASTROINTESTINAL PATHOGENS PANEL, PCR          Imaging Results              X-Ray Chest AP Portable (Final result)  Result time 12/31/24 18:42:01      Final result by Mirta Gayle MD (12/31/24 18:42:01)                   Impression:      No acute findings.      Electronically signed by: Mirta Gayle  Date:    12/31/2024  Time:    18:42               Narrative:    EXAMINATION:  XR CHEST AP PORTABLE    CLINICAL HISTORY:  leukocytosis;    TECHNIQUE:  Single frontal view of the chest was performed.    COMPARISON:  11/27/2024    FINDINGS:  Lungs are clear. No focal consolidation. No pleural effusion. No pneumothorax. Normal heart size.                                       CT Abdomen Pelvis With IV Contrast NO Oral Contrast (Final result)  Result time 12/31/24 15:14:55      Final result by Samuel Boyle MD  (12/31/24 15:14:55)                   Impression:      1. CT findings compatible with acute colitis as above, slightly worsened when compared to December 22, 2024.  No evidence of perforation or pneumatosis.  2. Additional stable findings as above.      Electronically signed by: Samuel Boyle  Date:    12/31/2024  Time:    15:14               Narrative:    EXAMINATION:  CT ABDOMEN PELVIS WITH IV CONTRAST    CLINICAL HISTORY:  Abdominal pain, acute, nonlocalized;.    TECHNIQUE:  Post infusion axial images were obtained from the lung bases to the pubic symphysis 100 cc nonionic contrast was utilized for the examination.    COMPARISON:  December 22, 2024    FINDINGS:  The lung bases are unremarkable.    No hepatic mass or contour abnormality is identified.  The gallbladder is absent.  The biliary tree is nondilated.  The spleen, pancreas, and adrenal glands are normal.  Bilateral nonobstructing renal calculi are similar to the prior study, the largest of which is at the lower pole on the left measuring 7 mm.  There are no ureteral calculi and there is no hydronephrosis.  The abdominal aorta is normal in caliber.    Assessment of bowel structures reveals persistent mild abnormal circumferential wall thickening of the colon from the proximal transverse colon to the rectum.  Compared to the prior study, the degree of wall thickening has increased slightly, with slightly increasing pericolonic edema, most notably at the level of the sigmoid colon.  Findings are compatible with acute colitis.  There is no evidence of perforation, pneumatosis, or obstruction.  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    Images of the pelvis demonstrate a normal urinary bladder.  There is no drainable free fluid or lymphadenopathy.    No acute osseous abnormalities are identified.                                       Medications   ciprofloxacin (CIPRO)400mg/200ml D5W IVPB 400 mg ( Intravenous Verify Only  12/31/24 1936)   busPIRone tablet 15 mg (has no administration in time range)   EScitalopram oxalate tablet 20 mg (has no administration in time range)   hyoscyamine 12-hr extended release tablet 0.375 mg (has no administration in time range)   LORazepam tablet 0.5 mg (has no administration in time range)   metoprolol succinate (TOPROL-XL) 24 hr tablet 50 mg (has no administration in time range)   pantoprazole EC tablet 40 mg (has no administration in time range)   rivaroxaban tablet 20 mg (has no administration in time range)   atorvastatin tablet 40 mg (has no administration in time range)   zolpidem tablet 10 mg (has no administration in time range)   sodium chloride 0.9% flush 2 mL (has no administration in time range)   melatonin tablet 6 mg (has no administration in time range)   ondansetron injection 4 mg (has no administration in time range)   acetaminophen tablet 650 mg (has no administration in time range)   aluminum-magnesium hydroxide-simethicone 200-200-20 mg/5 mL suspension 30 mL (has no administration in time range)   acetaminophen tablet 650 mg (has no administration in time range)   HYDROcodone-acetaminophen 5-325 mg per tablet 1 tablet (has no administration in time range)   naloxone 0.4 mg/mL injection 0.02 mg (has no administration in time range)   potassium bicarbonate disintegrating tablet 50 mEq (has no administration in time range)   potassium bicarbonate disintegrating tablet 35 mEq (has no administration in time range)   potassium bicarbonate disintegrating tablet 60 mEq (has no administration in time range)   magnesium oxide tablet 800 mg (has no administration in time range)   magnesium oxide tablet 800 mg (has no administration in time range)   potassium, sodium phosphates 280-160-250 mg packet 2 packet (has no administration in time range)   potassium, sodium phosphates 280-160-250 mg packet 2 packet (has no administration in time range)   potassium, sodium phosphates 280-160-250 mg  packet 2 packet (has no administration in time range)   glucose chewable tablet 16 g (has no administration in time range)   glucose chewable tablet 24 g (has no administration in time range)   dextrose 50% injection 12.5 g (has no administration in time range)   dextrose 50% injection 25 g (has no administration in time range)   glucagon (human recombinant) injection 1 mg (has no administration in time range)   0.9% NaCl infusion (has no administration in time range)   cefTRIAXone injection 1 g (has no administration in time range)   metronidazole IVPB 500 mg (has no administration in time range)   ondansetron injection 4 mg (4 mg Intravenous Given 12/31/24 1436)   iohexoL (OMNIPAQUE 350) injection 100 mL (100 mLs Intravenous Given 12/31/24 1454)   metoclopramide injection 10 mg (10 mg Intravenous Given 12/31/24 1624)   diphenhydrAMINE injection 25 mg (25 mg Intravenous Given 12/31/24 1624)   morphine injection 4 mg (4 mg Intravenous Given 12/31/24 1751)   ondansetron injection 4 mg (4 mg Intravenous Given 12/31/24 1807)     Medical Decision Making  49-year-old male presented with abdominal pain, nausea/vomiting, and hematochezia.    Initial differential diagnosis included but not limited to colitis, enteritis, and viral illness.    Amount and/or Complexity of Data Reviewed  Labs: ordered.  Radiology: ordered.    Risk  Prescription drug management.  Risk Details: The patient was emergently evaluated in the emergency department, his evaluation was significant for a middle-age male with abdominal tenderness.  The patient's labs were significant for an elevated white blood cell count and elevated CRP level.  The patient's CT scan does show a worsening colitis per Radiology.  The patient was treated here with IV pain medication, IV Reglan, IV Flagyl, and IV Cipro.  The case was discussed with GI on-call, Dr. Ramos made recommendations of checking a C diff test and then if that is negative then he can be started on  steroids.  The case was discussed with the hospitalist on-call, Dr. Moffett.  She has accepted the patient for admission.                                      Clinical Impression:  Final diagnoses:  [K52.9] Colitis          ED Disposition Condition    Observation                 Fahad Rodríguez MD  12/31/24 1939       Fahad Rodríguez MD  12/31/24 1940

## 2024-12-31 NOTE — FIRST PROVIDER EVALUATION
Emergency Department TeleTriage Encounter Note      CHIEF COMPLAINT    Chief Complaint   Patient presents with    Abdominal Pain     Reports history of UC     Vomiting    Diarrhea       VITAL SIGNS   Initial Vitals [12/31/24 1023]   BP Pulse Resp Temp SpO2   126/83 84 18 98.1 °F (36.7 °C) 99 %      MAP       --            ALLERGIES    Review of patient's allergies indicates:  No Known Allergies    PROVIDER TRIAGE NOTE  This is a teletriage evaluation of a 49 y.o. male presenting to the ED complaining of abdominal pain. Patient with history of ulcerative colitis and gastroparesis. He reports vomiting, bloody diarrhea, generalized abdominal pain, and headaches.    Patient is alert and oriented. He speaks in complete sentences. He is sitting upright in the chair in no distress.     Initial orders will be placed and care will be transferred to an alternate provider when patient is roomed for a full evaluation. Any additional orders and the final disposition will be determined by that provider.         ORDERS  Labs Reviewed   CBC W/ AUTO DIFFERENTIAL   COMPREHENSIVE METABOLIC PANEL   LIPASE   URINALYSIS, REFLEX TO URINE CULTURE       ED Orders (720h ago, onward)      Start Ordered     Status Ordering Provider    12/31/24 1036 12/31/24 1035  Vital signs  Every 2 hours         Ordered RUFINA, CLAUDIA G.    12/31/24 1035 12/31/24 1035  Diet NPO  Diet effective now         Ordered RUFINA, CLAUDIA G.    12/31/24 1035 12/31/24 1035  Insert peripheral IV  Once         Ordered RUFINA, CLAUDIA G.    12/31/24 1035 12/31/24 1035  CBC W/ AUTO DIFFERENTIAL  STAT         Ordered RUFINA, CLAUDIA G.    12/31/24 1035 12/31/24 1035  Comp. Metabolic Panel  STAT         Ordered RUFINA, CLAUDIA G.    12/31/24 1035 12/31/24 1035  Lipase  STAT         Ordered RUFINA, CLAUDIA G.    12/31/24 1035 12/31/24 1035  Urinalysis, Reflex to Urine Culture Urine, Clean Catch  STAT         Ordered RUFINA, CLAUDIA G.    12/31/24 1035 12/31/24 1035  CT Abdomen Pelvis With IV  Contrast NO Oral Contrast  1 time imaging         Ordered CLAUDIA ALMARAZ              Virtual Visit Note: The provider triage portion of this emergency department evaluation and documentation was performed via [employer], a HIPAA-compliant telemedicine application, in concert with a tele-presenter in the room. A face to face patient evaluation with one of my colleagues will occur once the patient is placed in an emergency department room.      DISCLAIMER: This note was prepared with [employer] voice recognition transcription software. Garbled syntax, mangled pronouns, and other bizarre constructions may be attributed to that software system.

## 2024-12-31 NOTE — SUBJECTIVE & OBJECTIVE
Past Medical History:   Diagnosis Date    C. difficile colitis     Cavitary pneumonia 11/2023    pos aspergillus serology    Diabetes mellitus 01/2022    Hyperlipidemia 2015    Hypertension 2015    Insomnia 2015    Ulcerative colitis        Past Surgical History:   Procedure Laterality Date    BRONCHOSCOPY WITH FLUOROSCOPY Left 11/20/2023    Procedure: BRONCHOSCOPY, WITH FLUOROSCOPY;  Surgeon: Lisa Villarreal MD;  Location: [facility];  Service: Pulmonary;  Laterality: Left;    BRONCHOSCOPY WITH FLUOROSCOPY N/A 11/30/2023    Procedure: BRONCHOSCOPY, WITH FLUOROSCOPY;  Surgeon: Lisa Villarreal MD;  Location: [facility];  Service: Pulmonary;  Laterality: N/A;    CARDIAC ELECTROPHYSIOLOGY MAPPING AND ABLATION  2017    SVT    CHOLECYSTECTOMY  2011    COLONOSCOPY N/A 5/3/2022    Procedure: COLONOSCOPY;  Surgeon: Shan Jean III, MD;  Location: [facility];  Service: Endoscopy;  Laterality: N/A;    COLONOSCOPY N/A 3/16/2024    Procedure: COLONOSCOPY;  Surgeon: ALEKSANDER Ramos MD;  Location: [facility];  Service: Endoscopy;  Laterality: N/A;    COLONOSCOPY WITH FECAL MICROBIOTA TRANSFER N/A 3/21/2023    Procedure: COLONOSCOPY, WITH FECAL MICROBIOTA TRANSFER;  Surgeon: David Millan MD;  Location: [facility];  Service: Endoscopy;  Laterality: N/A;    ESOPHAGOGASTRODUODENOSCOPY N/A 4/24/2023    Procedure: EGD (ESOPHAGOGASTRODUODENOSCOPY);  Surgeon: Shan Jean III, MD;  Location: [facility];  Service: Endoscopy;  Laterality: N/A;    ESOPHAGOGASTRODUODENOSCOPY N/A 6/5/2024    Procedure: EGD (ESOPHAGOGASTRODUODENOSCOPY);  Surgeon: Odalis Mccabe MD;  Location: [facility];  Service: Endoscopy;  Laterality: N/A;    FLEXIBLE SIGMOIDOSCOPY N/A 6/5/2024    Procedure: SIGMOIDOSCOPY, FLEXIBLE;  Surgeon: Odalis Mccabe MD;  Location: [facility];  Service: Endoscopy;  Laterality: N/A;    FLEXIBLE SIGMOIDOSCOPY N/A 7/16/2024    Procedure: SIGMOIDOSCOPY, FLEXIBLE;  Surgeon: Shan Jean III, MD;   Location: [facility];  Service: Endoscopy;  Laterality: N/A;    FLEXIBLE SIGMOIDOSCOPY N/A 8/13/2024    Procedure: SIGMOIDOSCOPY, FLEXIBLE;  Surgeon: Shan Jean III, MD;  Location: [facility];  Service: Endoscopy;  Laterality: N/A;    GANGLION CYST EXCISION Left 1992    UMBILICAL HERNIA REPAIR  2011    with mesh       Review of patient's allergies indicates:  No Known Allergies    Current Facility-Administered Medications on File Prior to Encounter   Medication    lactated ringers infusion     Current Outpatient Medications on File Prior to Encounter   Medication Sig    budesonide (ENTOCORT EC) 3 mg capsule Take 3 capsules (9 mg total) by mouth once daily.    busPIRone (BUSPAR) 15 MG tablet Take 1 tablet (15 mg total) by mouth 3 (three) times daily.    colchicine (COLCRYS) 0.6 mg tablet Take 1 tablet (0.6 mg total) by mouth 2 (two) times daily.    EScitalopram oxalate (LEXAPRO) 20 MG tablet Take 1 tablet (20 mg total) by mouth once daily.    HYDROcodone-acetaminophen (NORCO) 7.5-325 mg per tablet Take 1 tablet by mouth every 6 (six) hours as needed for Pain.    hyoscyamine (LEVBID) 0.375 mg Tb12 Take 0.375 mg by mouth 2 (two) times daily.    LORazepam (ATIVAN) 0.5 MG tablet Take 1 tablet (0.5 mg total) by mouth every 6 (six) hours as needed for Anxiety.    magnesium oxide (MAG-OX) 400 mg (241.3 mg magnesium) tablet Take 1 tablet (400 mg total) by mouth 2 (two) times daily.    metoclopramide HCl (REGLAN) 5 MG tablet Take 1 tablet (5 mg total) by mouth every 8 (eight) hours as needed (take as needed before meals).    metoprolol succinate (TOPROL-XL) 50 MG 24 hr tablet Take 1 tablet (50 mg total) by mouth once daily.    pantoprazole (PROTONIX) 40 MG tablet Take 40 mg by mouth 2 (two) times daily.    promethazine (PHENERGAN) 25 MG tablet Take 25 mg by mouth 4 (four) times daily as needed for Nausea.    rivaroxaban (XARELTO) 20 mg Tab Take 1 tablet (20 mg total) by mouth daily with dinner or evening meal.     simvastatin (ZOCOR) 20 MG tablet Take 1 tablet (20 mg total) by mouth every evening.    sodium zirconium cyclosilicate (LOKELMA) 5 gram packet Take 1 packet (5 g total) by mouth 2 (two) times a day. Mix entire contents of packet(s) into drinking glass containing 3 tablespoons of water; stir well and drink immediately. Add water and repeat until no powder remains to receive entire dose. (Patient not taking: Reported on 12/27/2024)    zolpidem (AMBIEN) 10 mg Tab Take 1 tablet (10 mg total) by mouth nightly as needed (insomnia).     Family History       Problem Relation (Age of Onset)    Asthma Mother          Tobacco Use    Smoking status: Former     Average packs/day: 1 pack/day for 30.0 years (30.0 ttl pk-yrs)     Types: Cigarettes     Start date: 1993    Smokeless tobacco: Never    Tobacco comments:     Pt states he has stopped smoking with Chantix three weeks ago. 12/1/23   Substance and Sexual Activity    Alcohol use: Yes     Comment: ocass    Drug use: Not Currently    Sexual activity: Not Currently     Review of Systems   Constitutional:  Positive for chills. Negative for fever.   HENT:  Negative for sore throat.    Eyes:  Negative for visual disturbance.   Respiratory:  Negative for cough and shortness of breath.    Cardiovascular:  Negative for chest pain and palpitations.   Gastrointestinal:  Positive for abdominal pain, blood in stool, nausea and vomiting.   Endocrine: Negative for polydipsia and polyuria.   Genitourinary:  Negative for dysuria, frequency and urgency.   Musculoskeletal:  Negative for back pain.   Skin:  Negative for rash.   Neurological:  Negative for syncope.   Psychiatric/Behavioral:  Negative for confusion.      Objective:     Vital Signs (Most Recent):  Temp: 98.1 °F (36.7 °C) (12/31/24 1023)  Pulse: 72 (12/31/24 1600)  Resp: 18 (12/31/24 1023)  BP: 138/75 (12/31/24 1600)  SpO2: 100 % (12/31/24 1600) Vital Signs (24h Range):  Temp:  [98.1 °F (36.7 °C)] 98.1 °F (36.7 °C)  Pulse:  [72-84]  72  Resp:  [18] 18  SpO2:  [99 %-100 %] 100 %  BP: (126-138)/(75-83) 138/75     Weight: 132.9 kg (293 lb)  Body mass index is 48.76 kg/m².     Physical Exam  Vitals reviewed.   Constitutional:       Appearance: Normal appearance.   HENT:      Head: Normocephalic and atraumatic.      Mouth/Throat:      Mouth: Mucous membranes are moist.   Eyes:      Extraocular Movements: Extraocular movements intact.      Conjunctiva/sclera: Conjunctivae normal.      Pupils: Pupils are equal, round, and reactive to light.   Cardiovascular:      Rate and Rhythm: Normal rate and regular rhythm.   Pulmonary:      Effort: Pulmonary effort is normal. No respiratory distress.      Breath sounds: Normal breath sounds. No wheezing.   Abdominal:      General: Abdomen is flat.      Palpations: Abdomen is soft.      Comments: Tenderness on palpation of the left upper and lower quadrant.   Musculoskeletal:         General: No swelling or tenderness.      Cervical back: Normal range of motion and neck supple.   Skin:     General: Skin is warm.   Neurological:      General: No focal deficit present.      Mental Status: He is alert and oriented to person, place, and time.   Psychiatric:         Mood and Affect: Mood normal.         Behavior: Behavior normal.             Significant Labs: All pertinent labs within the past 24 hours have been reviewed.  Recent Lab Results         12/31/24  1351   12/31/24  1320        Albumin 3.9         ALP 71         ALT 8         Anion Gap 8         AST 10         Baso #   0.06       Basophil %   0.4       BILIRUBIN TOTAL 0.3  Comment: For infants and newborns, interpretation of results should be based  on gestational age, weight and in agreement with clinical  observations.    Premature Infant recommended reference ranges:  Up to 24 hours.............<8.0 mg/dL  Up to 48 hours............<12.0 mg/dL  3-5 days..................<15.0 mg/dL  6-29 days.................<15.0 mg/dL           BUN 12         Calcium  9.4         Chloride 99         CO2 27         Creatinine 1.1         CRP 2.80  Comment: CRP-Normal Application expected values:   <1.0        mg/dL   Normal Range  1.0 - 5.0  mg/dL   Indicates mild inflammation  5.0 - 10.0 mg/dL   Indicates severe inflammation  >10.0        mg/dL   Represents serious processes and   frequently         indicates the presence of a bacterial   infection.            Differential Method   Automated       eGFR >60.0         Eos #   0.3       Eos %   1.9       Glucose 105         Gran # (ANC)   10.5       Gran %   77.6       Hematocrit   35.0       Hemoglobin   10.8       Immature Grans (Abs)   0.08  Comment: Mild elevation in immature granulocytes is non specific and   can be seen in a variety of conditions including stress response,   acute inflammation, trauma and pregnancy. Correlation with other   laboratory and clinical findings is essential.         Immature Granulocytes   0.6       Lipase 4         Lymph #   1.3       Lymph %   9.4       Magnesium  1.7         MCH   26.6       MCHC   30.9       MCV   86       Mono #   1.4       Mono %   10.1       MPV   10.8       nRBC   0       Platelet Count   322       Potassium 3.6         PROTEIN TOTAL 7.0         RBC   4.06       RDW   17.8       Sodium 134         WBC   13.50               Significant Imaging: I have reviewed all pertinent imaging results/findings within the past 24 hours.

## 2024-12-31 NOTE — ASSESSMENT & PLAN NOTE
Secondary to colitis.  He see colitis further plan of care.  We will also order UA and chest x-ray.

## 2024-12-31 NOTE — ED NOTES
Bed:  01  Expected date:   Expected time:   Means of arrival:   Comments:   Please call pt to schedule check-up soon Petey or Urszula Garcia.

## 2024-12-31 NOTE — ASSESSMENT & PLAN NOTE
Infectious colitis versus ulcerative colitis flare.  I will order GI PCR.  If negative, will start steroids.  I will also cover with antibiotics empirically given his leukocytosis.  GI consultation.  Appreciate recommendation.  Iv fluids for hydration, pain and nausea prn meds.

## 2024-12-31 NOTE — H&P
[facility] - Emergency Dept  Hospital Medicine  History & Physical    Patient Name: Jacoby Jean-Baptiste  MRN: [id number]  Patient Class: Emergency  Admission Date: 12/31/2024  Attending Physician: Cele Moffett MD  Primary Care Provider: Hunter Aguilar III, MD         Patient information was obtained from patient and ER records.     Subjective:     Principal Problem:Colitis    Chief Complaint:   Chief Complaint   Patient presents with    Abdominal Pain     Reports history of UC     Vomiting    Diarrhea        HPI: 50 yo male with PMH of ulcerative colitis presented because of worsening abdominal pain and diarrhea.  According to the patient, he has a h/o on and off diarrhea and abdominal cramp related to his ulcerative colitis.  He was recently admitted on 12/22 because of abdominal pain/nausea/vomiting/hematochezia.  At that time, his CT showed pan colitis, was seen by GI who suggested that his symptoms are likely not secondary to ulcerative colitis flare.  After his discharge, he started to have perfused diarrhea, and abdominal cramps/pain.  He is having anywhere between 10-15 bowel movements a day, some of them were hematochezia.  His abdominal pain is described as crampy, severe, 10/10 on pain scale.  He has been having chills with no fever. As a result, he decided to come back to the ER for evaluation.    In the ER, vitals showed blood pressure of 126/83, heart rate of 84, respiratory rate of 18, afebrile satting 99% on room air.  CBC with white count of 13.5, and hemoglobin of 10.8.  CBC with sodium of 134.  CRP is mildly elevated at 2.8. CT abdomen/pelvis showed findings compatible with acute colitis slightly worsened when compared to December 22, 2024. No evidence of perforation or pneumatosis.  He was given Reglan, Benadryl and Zofran.  He will be admitted to Medicine for further management.      Past Medical History:   Diagnosis Date    C. difficile colitis     Cavitary pneumonia 11/2023     pos aspergillus serology    Diabetes mellitus 01/2022    Hyperlipidemia 2015    Hypertension 2015    Insomnia 2015    Ulcerative colitis        Past Surgical History:   Procedure Laterality Date    BRONCHOSCOPY WITH FLUOROSCOPY Left 11/20/2023    Procedure: BRONCHOSCOPY, WITH FLUOROSCOPY;  Surgeon: Lisa Villarreal MD;  Location: [facility];  Service: Pulmonary;  Laterality: Left;    BRONCHOSCOPY WITH FLUOROSCOPY N/A 11/30/2023    Procedure: BRONCHOSCOPY, WITH FLUOROSCOPY;  Surgeon: Lisa Villarreal MD;  Location: [facility];  Service: Pulmonary;  Laterality: N/A;    CARDIAC ELECTROPHYSIOLOGY MAPPING AND ABLATION  2017    SVT    CHOLECYSTECTOMY  2011    COLONOSCOPY N/A 5/3/2022    Procedure: COLONOSCOPY;  Surgeon: Shan eJan III, MD;  Location: [facility];  Service: Endoscopy;  Laterality: N/A;    COLONOSCOPY N/A 3/16/2024    Procedure: COLONOSCOPY;  Surgeon: ALEKSANDER Ramos MD;  Location: [facility];  Service: Endoscopy;  Laterality: N/A;    COLONOSCOPY WITH FECAL MICROBIOTA TRANSFER N/A 3/21/2023    Procedure: COLONOSCOPY, WITH FECAL MICROBIOTA TRANSFER;  Surgeon: David Millan MD;  Location: [facility];  Service: Endoscopy;  Laterality: N/A;    ESOPHAGOGASTRODUODENOSCOPY N/A 4/24/2023    Procedure: EGD (ESOPHAGOGASTRODUODENOSCOPY);  Surgeon: Shan Jean III, MD;  Location: [facility];  Service: Endoscopy;  Laterality: N/A;    ESOPHAGOGASTRODUODENOSCOPY N/A 6/5/2024    Procedure: EGD (ESOPHAGOGASTRODUODENOSCOPY);  Surgeon: Odalis Mccabe MD;  Location: [facility];  Service: Endoscopy;  Laterality: N/A;    FLEXIBLE SIGMOIDOSCOPY N/A 6/5/2024    Procedure: SIGMOIDOSCOPY, FLEXIBLE;  Surgeon: Odalis Mccabe MD;  Location: [facility] ENDO;  Service: Endoscopy;  Laterality: N/A;    FLEXIBLE SIGMOIDOSCOPY N/A 7/16/2024    Procedure: SIGMOIDOSCOPY, FLEXIBLE;  Surgeon: Shan Jean III, MD;  Location: [facility];  Service: Endoscopy;  Laterality: N/A;    FLEXIBLE SIGMOIDOSCOPY N/A 8/13/2024     Procedure: SIGMOIDOSCOPY, FLEXIBLE;  Surgeon: Shan Jean III, MD;  Location: [facility];  Service: Endoscopy;  Laterality: N/A;    GANGLION CYST EXCISION Left 1992    UMBILICAL HERNIA REPAIR  2011    with mesh       Review of patient's allergies indicates:  No Known Allergies    Current Facility-Administered Medications on File Prior to Encounter   Medication    lactated ringers infusion     Current Outpatient Medications on File Prior to Encounter   Medication Sig    budesonide (ENTOCORT EC) 3 mg capsule Take 3 capsules (9 mg total) by mouth once daily.    busPIRone (BUSPAR) 15 MG tablet Take 1 tablet (15 mg total) by mouth 3 (three) times daily.    colchicine (COLCRYS) 0.6 mg tablet Take 1 tablet (0.6 mg total) by mouth 2 (two) times daily.    EScitalopram oxalate (LEXAPRO) 20 MG tablet Take 1 tablet (20 mg total) by mouth once daily.    HYDROcodone-acetaminophen (NORCO) 7.5-325 mg per tablet Take 1 tablet by mouth every 6 (six) hours as needed for Pain.    hyoscyamine (LEVBID) 0.375 mg Tb12 Take 0.375 mg by mouth 2 (two) times daily.    LORazepam (ATIVAN) 0.5 MG tablet Take 1 tablet (0.5 mg total) by mouth every 6 (six) hours as needed for Anxiety.    magnesium oxide (MAG-OX) 400 mg (241.3 mg magnesium) tablet Take 1 tablet (400 mg total) by mouth 2 (two) times daily.    metoclopramide HCl (REGLAN) 5 MG tablet Take 1 tablet (5 mg total) by mouth every 8 (eight) hours as needed (take as needed before meals).    metoprolol succinate (TOPROL-XL) 50 MG 24 hr tablet Take 1 tablet (50 mg total) by mouth once daily.    pantoprazole (PROTONIX) 40 MG tablet Take 40 mg by mouth 2 (two) times daily.    promethazine (PHENERGAN) 25 MG tablet Take 25 mg by mouth 4 (four) times daily as needed for Nausea.    rivaroxaban (XARELTO) 20 mg Tab Take 1 tablet (20 mg total) by mouth daily with dinner or evening meal.    simvastatin (ZOCOR) 20 MG tablet Take 1 tablet (20 mg total) by mouth every evening.    sodium zirconium  cyclosilicate (LOKELMA) 5 gram packet Take 1 packet (5 g total) by mouth 2 (two) times a day. Mix entire contents of packet(s) into drinking glass containing 3 tablespoons of water; stir well and drink immediately. Add water and repeat until no powder remains to receive entire dose. (Patient not taking: Reported on 12/27/2024)    zolpidem (AMBIEN) 10 mg Tab Take 1 tablet (10 mg total) by mouth nightly as needed (insomnia).     Family History       Problem Relation (Age of Onset)    Asthma Mother          Tobacco Use    Smoking status: Former     Average packs/day: 1 pack/day for 30.0 years (30.0 ttl pk-yrs)     Types: Cigarettes     Start date: 1993    Smokeless tobacco: Never    Tobacco comments:     Pt states he has stopped smoking with Chantix three weeks ago. 12/1/23   Substance and Sexual Activity    Alcohol use: Yes     Comment: ocass    Drug use: Not Currently    Sexual activity: Not Currently     Review of Systems   Constitutional:  Positive for chills. Negative for fever.   HENT:  Negative for sore throat.    Eyes:  Negative for visual disturbance.   Respiratory:  Negative for cough and shortness of breath.    Cardiovascular:  Negative for chest pain and palpitations.   Gastrointestinal:  Positive for abdominal pain, blood in stool, nausea and vomiting.   Endocrine: Negative for polydipsia and polyuria.   Genitourinary:  Negative for dysuria, frequency and urgency.   Musculoskeletal:  Negative for back pain.   Skin:  Negative for rash.   Neurological:  Negative for syncope.   Psychiatric/Behavioral:  Negative for confusion.      Objective:     Vital Signs (Most Recent):  Temp: 98.1 °F (36.7 °C) (12/31/24 1023)  Pulse: 72 (12/31/24 1600)  Resp: 18 (12/31/24 1023)  BP: 138/75 (12/31/24 1600)  SpO2: 100 % (12/31/24 1600) Vital Signs (24h Range):  Temp:  [98.1 °F (36.7 °C)] 98.1 °F (36.7 °C)  Pulse:  [72-84] 72  Resp:  [18] 18  SpO2:  [99 %-100 %] 100 %  BP: (126-138)/(75-83) 138/75     Weight: 132.9 kg (293  lb)  Body mass index is 48.76 kg/m².     Physical Exam  Vitals reviewed.   Constitutional:       Appearance: Normal appearance.   HENT:      Head: Normocephalic and atraumatic.      Mouth/Throat:      Mouth: Mucous membranes are moist.   Eyes:      Extraocular Movements: Extraocular movements intact.      Conjunctiva/sclera: Conjunctivae normal.      Pupils: Pupils are equal, round, and reactive to light.   Cardiovascular:      Rate and Rhythm: Normal rate and regular rhythm.   Pulmonary:      Effort: Pulmonary effort is normal. No respiratory distress.      Breath sounds: Normal breath sounds. No wheezing.   Abdominal:      General: Abdomen is flat.      Palpations: Abdomen is soft.      Comments: Tenderness on palpation of the left upper and lower quadrant.   Musculoskeletal:         General: No swelling or tenderness.      Cervical back: Normal range of motion and neck supple.   Skin:     General: Skin is warm.   Neurological:      General: No focal deficit present.      Mental Status: He is alert and oriented to person, place, and time.   Psychiatric:         Mood and Affect: Mood normal.         Behavior: Behavior normal.             Significant Labs: All pertinent labs within the past 24 hours have been reviewed.  Recent Lab Results         12/31/24  1351   12/31/24  1320        Albumin 3.9         ALP 71         ALT 8         Anion Gap 8         AST 10         Baso #   0.06       Basophil %   0.4       BILIRUBIN TOTAL 0.3  Comment: For infants and newborns, interpretation of results should be based  on gestational age, weight and in agreement with clinical  observations.    Premature Infant recommended reference ranges:  Up to 24 hours.............<8.0 mg/dL  Up to 48 hours............<12.0 mg/dL  3-5 days..................<15.0 mg/dL  6-29 days.................<15.0 mg/dL           BUN 12         Calcium 9.4         Chloride 99         CO2 27         Creatinine 1.1         CRP 2.80  Comment: CRP-Normal  Application expected values:   <1.0        mg/dL   Normal Range  1.0 - 5.0  mg/dL   Indicates mild inflammation  5.0 - 10.0 mg/dL   Indicates severe inflammation  >10.0        mg/dL   Represents serious processes and   frequently         indicates the presence of a bacterial   infection.            Differential Method   Automated       eGFR >60.0         Eos #   0.3       Eos %   1.9       Glucose 105         Gran # (ANC)   10.5       Gran %   77.6       Hematocrit   35.0       Hemoglobin   10.8       Immature Grans (Abs)   0.08  Comment: Mild elevation in immature granulocytes is non specific and   can be seen in a variety of conditions including stress response,   acute inflammation, trauma and pregnancy. Correlation with other   laboratory and clinical findings is essential.         Immature Granulocytes   0.6       Lipase 4         Lymph #   1.3       Lymph %   9.4       Magnesium  1.7         MCH   26.6       MCHC   30.9       MCV   86       Mono #   1.4       Mono %   10.1       MPV   10.8       nRBC   0       Platelet Count   322       Potassium 3.6         PROTEIN TOTAL 7.0         RBC   4.06       RDW   17.8       Sodium 134         WBC   13.50               Significant Imaging: I have reviewed all pertinent imaging results/findings within the past 24 hours.  Assessment/Plan:     * Colitis  Infectious colitis versus ulcerative colitis flare.  I will order GI PCR and c.diffi testing.  If negative, will start steroids.  I will also cover with antibiotics empirically given his leukocytosis.  GI consultation.  Appreciate recommendation.  Iv fluids for hydration, pain and nausea prn meds.      Leukocytosis  Secondary to colitis.  He see colitis further plan of care.  We will also order UA and chest x-ray.      Gastroparesis  Will hold off Reglan for now as it can exacerbate his diarrhea.      DVT (deep venous thrombosis)  Resume Xarelto.  Patient's hemoglobin is as baseline, and he has hypercoagulable state with  his history of ulcerative colitis.      BMI 45.0-49.9, adult  Body mass index is 48.76 kg/m². Morbid obesity complicates all aspects of disease management from diagnostic modalities to treatment. Weight loss encouraged and health benefits explained to patient.         History of supraventricular tachycardia  Resume metoprolol.        VTE Risk Mitigation (From admission, onward)      None                            Cele Moffett MD  Department of Hospital Medicine  [facility] - Emergency Dept

## 2025-01-01 LAB
ALBUMIN SERPL BCP-MCNC: 3.5 G/DL (ref 3.5–5.2)
ALP SERPL-CCNC: 69 U/L (ref 55–135)
ALT SERPL W/O P-5'-P-CCNC: 7 U/L (ref 10–44)
ANION GAP SERPL CALC-SCNC: 8 MMOL/L (ref 8–16)
AST SERPL-CCNC: 10 U/L (ref 10–40)
BASOPHILS # BLD AUTO: 0.07 K/UL (ref 0–0.2)
BASOPHILS NFR BLD: 0.7 % (ref 0–1.9)
BILIRUB SERPL-MCNC: 0.4 MG/DL (ref 0.1–1)
BUN SERPL-MCNC: 11 MG/DL (ref 6–20)
CALCIUM SERPL-MCNC: 8.8 MG/DL (ref 8.7–10.5)
CHLORIDE SERPL-SCNC: 102 MMOL/L (ref 95–110)
CO2 SERPL-SCNC: 23 MMOL/L (ref 23–29)
CREAT SERPL-MCNC: 1.1 MG/DL (ref 0.5–1.4)
DIFFERENTIAL METHOD BLD: ABNORMAL
EOSINOPHIL # BLD AUTO: 0.3 K/UL (ref 0–0.5)
EOSINOPHIL NFR BLD: 3.3 % (ref 0–8)
ERYTHROCYTE [DISTWIDTH] IN BLOOD BY AUTOMATED COUNT: 17.7 % (ref 11.5–14.5)
EST. GFR  (NO RACE VARIABLE): >60 ML/MIN/1.73 M^2
GLUCOSE SERPL-MCNC: 77 MG/DL (ref 70–110)
HCT VFR BLD AUTO: 34.4 % (ref 40–54)
HGB BLD-MCNC: 10.2 G/DL (ref 14–18)
IMM GRANULOCYTES # BLD AUTO: 0.06 K/UL (ref 0–0.04)
IMM GRANULOCYTES NFR BLD AUTO: 0.6 % (ref 0–0.5)
LYMPHOCYTES # BLD AUTO: 1.6 K/UL (ref 1–4.8)
LYMPHOCYTES NFR BLD: 15.2 % (ref 18–48)
MAGNESIUM SERPL-MCNC: 1.7 MG/DL (ref 1.6–2.6)
MCH RBC QN AUTO: 26.8 PG (ref 27–31)
MCHC RBC AUTO-ENTMCNC: 29.7 G/DL (ref 32–36)
MCV RBC AUTO: 90 FL (ref 82–98)
MONOCYTES # BLD AUTO: 1.5 K/UL (ref 0.3–1)
MONOCYTES NFR BLD: 14.1 % (ref 4–15)
NEUTROPHILS # BLD AUTO: 6.9 K/UL (ref 1.8–7.7)
NEUTROPHILS NFR BLD: 66.1 % (ref 38–73)
NRBC BLD-RTO: 0 /100 WBC
PLATELET # BLD AUTO: 275 K/UL (ref 150–450)
PMV BLD AUTO: 10 FL (ref 9.2–12.9)
POTASSIUM SERPL-SCNC: 3.9 MMOL/L (ref 3.5–5.1)
PROT SERPL-MCNC: 6.3 G/DL (ref 6–8.4)
RBC # BLD AUTO: 3.81 M/UL (ref 4.6–6.2)
SODIUM SERPL-SCNC: 133 MMOL/L (ref 136–145)
WBC # BLD AUTO: 10.46 K/UL (ref 3.9–12.7)

## 2025-01-01 PROCEDURE — 36415 COLL VENOUS BLD VENIPUNCTURE: CPT | Performed by: INTERNAL MEDICINE

## 2025-01-01 PROCEDURE — G0378 HOSPITAL OBSERVATION PER HR: HCPCS

## 2025-01-01 PROCEDURE — 80053 COMPREHEN METABOLIC PANEL: CPT | Performed by: HOSPITALIST

## 2025-01-01 PROCEDURE — 36415 COLL VENOUS BLD VENIPUNCTURE: CPT | Performed by: HOSPITALIST

## 2025-01-01 PROCEDURE — 25000003 PHARM REV CODE 250: Performed by: HOSPITALIST

## 2025-01-01 PROCEDURE — 63600175 PHARM REV CODE 636 W HCPCS: Performed by: HOSPITALIST

## 2025-01-01 PROCEDURE — 63600175 PHARM REV CODE 636 W HCPCS: Performed by: INTERNAL MEDICINE

## 2025-01-01 PROCEDURE — 25000003 PHARM REV CODE 250

## 2025-01-01 PROCEDURE — 96372 THER/PROPH/DIAG INJ SC/IM: CPT | Performed by: HOSPITALIST

## 2025-01-01 PROCEDURE — 83735 ASSAY OF MAGNESIUM: CPT | Performed by: HOSPITALIST

## 2025-01-01 PROCEDURE — 85025 COMPLETE CBC W/AUTO DIFF WBC: CPT | Performed by: HOSPITALIST

## 2025-01-01 PROCEDURE — 87449 NOS EACH ORGANISM AG IA: CPT | Performed by: HOSPITALIST

## 2025-01-01 RX ORDER — DIPHENHYDRAMINE HCL 25 MG
25 CAPSULE ORAL EVERY 6 HOURS PRN
Status: DISCONTINUED | OUTPATIENT
Start: 2025-01-01 | End: 2025-01-09 | Stop reason: HOSPADM

## 2025-01-01 RX ORDER — METOCLOPRAMIDE 5 MG/1
5 TABLET ORAL
Status: DISCONTINUED | OUTPATIENT
Start: 2025-01-01 | End: 2025-01-03

## 2025-01-01 RX ORDER — MORPHINE SULFATE 2 MG/ML
2 INJECTION, SOLUTION INTRAMUSCULAR; INTRAVENOUS
Status: DISCONTINUED | OUTPATIENT
Start: 2025-01-01 | End: 2025-01-03

## 2025-01-01 RX ADMIN — MORPHINE SULFATE 2 MG: 2 INJECTION, SOLUTION INTRAMUSCULAR; INTRAVENOUS at 12:01

## 2025-01-01 RX ADMIN — SODIUM CHLORIDE: 9 INJECTION, SOLUTION INTRAVENOUS at 09:01

## 2025-01-01 RX ADMIN — HYDROCODONE BITARTRATE AND ACETAMINOPHEN 1 TABLET: 5; 325 TABLET ORAL at 07:01

## 2025-01-01 RX ADMIN — MORPHINE SULFATE 2 MG: 2 INJECTION, SOLUTION INTRAMUSCULAR; INTRAVENOUS at 03:01

## 2025-01-01 RX ADMIN — DIPHENHYDRAMINE HYDROCHLORIDE 25 MG: 25 CAPSULE ORAL at 09:01

## 2025-01-01 RX ADMIN — MORPHINE SULFATE 2 MG: 2 INJECTION, SOLUTION INTRAMUSCULAR; INTRAVENOUS at 08:01

## 2025-01-01 RX ADMIN — MORPHINE SULFATE 2 MG: 2 INJECTION, SOLUTION INTRAMUSCULAR; INTRAVENOUS at 01:01

## 2025-01-01 RX ADMIN — BUSPIRONE HYDROCHLORIDE 15 MG: 5 TABLET ORAL at 08:01

## 2025-01-01 RX ADMIN — ENOXAPARIN SODIUM 40 MG: 40 INJECTION SUBCUTANEOUS at 08:01

## 2025-01-01 RX ADMIN — ONDANSETRON 4 MG: 2 INJECTION INTRAMUSCULAR; INTRAVENOUS at 04:01

## 2025-01-01 RX ADMIN — ZOLPIDEM TARTRATE 10 MG: 5 TABLET, COATED ORAL at 09:01

## 2025-01-01 RX ADMIN — MORPHINE SULFATE 2 MG: 2 INJECTION, SOLUTION INTRAMUSCULAR; INTRAVENOUS at 04:01

## 2025-01-01 RX ADMIN — ONDANSETRON 4 MG: 2 INJECTION INTRAMUSCULAR; INTRAVENOUS at 05:01

## 2025-01-01 RX ADMIN — PANTOPRAZOLE SODIUM 40 MG: 40 TABLET, DELAYED RELEASE ORAL at 04:01

## 2025-01-01 RX ADMIN — ENOXAPARIN SODIUM 40 MG: 40 INJECTION SUBCUTANEOUS at 09:01

## 2025-01-01 RX ADMIN — HYOSCYAMINE SULFATE 0.25 MG: 0.12 TABLET SUBLINGUAL at 08:01

## 2025-01-01 RX ADMIN — BUSPIRONE HYDROCHLORIDE 15 MG: 5 TABLET ORAL at 02:01

## 2025-01-01 RX ADMIN — ONDANSETRON 4 MG: 2 INJECTION INTRAMUSCULAR; INTRAVENOUS at 12:01

## 2025-01-01 RX ADMIN — ATORVASTATIN CALCIUM 40 MG: 40 TABLET, FILM COATED ORAL at 09:01

## 2025-01-01 RX ADMIN — HYOSCYAMINE SULFATE 0.25 MG: 0.12 TABLET SUBLINGUAL at 03:01

## 2025-01-01 RX ADMIN — DIPHENHYDRAMINE HYDROCHLORIDE 25 MG: 25 CAPSULE ORAL at 02:01

## 2025-01-01 RX ADMIN — METOCLOPRAMIDE 5 MG: 5 TABLET ORAL at 02:01

## 2025-01-01 RX ADMIN — HYOSCYAMINE SULFATE 0.25 MG: 0.12 TABLET SUBLINGUAL at 01:01

## 2025-01-01 RX ADMIN — BUSPIRONE HYDROCHLORIDE 15 MG: 5 TABLET ORAL at 09:01

## 2025-01-01 RX ADMIN — ESCITALOPRAM OXALATE 20 MG: 10 TABLET ORAL at 08:01

## 2025-01-01 RX ADMIN — METOPROLOL SUCCINATE 50 MG: 50 TABLET, FILM COATED, EXTENDED RELEASE ORAL at 08:01

## 2025-01-01 NOTE — PROGRESS NOTES
Called lab they stated C DIFF will not be run until 0900 am. Relayed information to DR Loza   01/01/25 0028   Pain/Comfort/Sleep   Comfort/Acceptable Pain Level 0   Pain Rating (0-10): Rest 8   Cognitive   Cognitive/Neuro/Behavioral WDL WDL   Pupils   Pupil PERRLA yes   HEENT   HEENT WDL WDL   Respiratory   Respiratory WDL ex  (wheezes)   Cardiac   Cardiac WDL WDL   Peripheral Neurovascular   Peripheral Neurovascular WDL WDL   Skin   Skin WDL WDL   4EYES: 2 Clinical Staff Inspection of bony prominences No new pressure injury noted   4EYES: 2nd Clinical Staff Member (Type Name) maximilian

## 2025-01-01 NOTE — PROGRESS NOTES
"- Received a message from the GI attending Dr. Jean, " don't give this linda an antibiotic unless you got a real reason. 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 "  - Antibiotic was stopped, but patient has already received Cipro IV.  - GI agreed that C diff and other infectious diarrhea need to be ruled out prior to starting methylprednisolone 40 mg IV b.i.d..  - Per GI, patient will "inpatient flex or colon." I will stop Xarelto, and start patient on Lovenox prophylaxis dose for now starting tomorrow.  "

## 2025-01-01 NOTE — ASSESSMENT & PLAN NOTE
Infectious colitis versus ulcerative colitis flare.  CMV and c.diffi testing pending. If negative, will start steroids per GI. Dr. Jean considering felx sig vs colonsocopy while inpatient  Iv fluids for hydration, pain and nausea prn meds.

## 2025-01-01 NOTE — SUBJECTIVE & OBJECTIVE
Interval History:  Patient seen and examined this morning during rounds.  He continues to have abdominal pain.  He had several episodes of diarrhea prior to admission and complains of diarrhea today as well.  He denies any hematochezia at this time.  He is afebrile and hemodynamically stable.  He does report ongoing nausea with emesis depending on diet.  He reports little improvement with Reglan.  I discussed with him that we will order the Reglan and he may decide if you would like to take it or not.    Review of Systems   Constitutional:  Negative for chills and fever.   HENT:  Negative for sore throat.    Eyes:  Negative for visual disturbance.   Respiratory:  Negative for cough and shortness of breath.    Cardiovascular:  Negative for chest pain and palpitations.   Gastrointestinal:  Positive for abdominal pain, blood in stool and vomiting. Negative for nausea.   Endocrine: Negative for polydipsia and polyuria.   Genitourinary:  Negative for dysuria, frequency and urgency.   Musculoskeletal:  Negative for back pain.   Skin:  Negative for rash.   Neurological:  Negative for syncope.   Psychiatric/Behavioral:  Negative for confusion.      Objective:     Vital Signs (Most Recent):  Temp: 98 °F (36.7 °C) (01/01/25 1127)  Pulse: 75 (01/01/25 1127)  Resp: 18 (01/01/25 1208)  BP: 113/73 (01/01/25 1127)  SpO2: 96 % (01/01/25 1127) Vital Signs (24h Range):  Temp:  [98 °F (36.7 °C)-98.4 °F (36.9 °C)] 98 °F (36.7 °C)  Pulse:  [72-86] 75  Resp:  [14-18] 18  SpO2:  [95 %-100 %] 96 %  BP: (104-161)/(59-88) 113/73     Weight: 132.9 kg (293 lb)  Body mass index is 48.76 kg/m².    Intake/Output Summary (Last 24 hours) at 1/1/2025 1255  Last data filed at 1/1/2025 0832  Gross per 24 hour   Intake 435.74 ml   Output --   Net 435.74 ml         Physical Exam  Vitals reviewed.   Constitutional:       Appearance: Normal appearance.   HENT:      Head: Normocephalic and atraumatic.      Mouth/Throat:      Mouth: Mucous membranes are  moist.   Eyes:      Extraocular Movements: Extraocular movements intact.      Conjunctiva/sclera: Conjunctivae normal.      Pupils: Pupils are equal, round, and reactive to light.   Cardiovascular:      Rate and Rhythm: Normal rate and regular rhythm.   Pulmonary:      Effort: Pulmonary effort is normal. No respiratory distress.      Breath sounds: Normal breath sounds. No wheezing.   Abdominal:      General: Abdomen is flat.      Palpations: Abdomen is soft.      Tenderness: There is no guarding or rebound.      Comments: Tenderness throughout   Musculoskeletal:         General: No swelling or tenderness.      Cervical back: Normal range of motion and neck supple.   Skin:     General: Skin is warm.   Neurological:      General: No focal deficit present.      Mental Status: He is alert and oriented to person, place, and time.   Psychiatric:         Mood and Affect: Mood normal.         Behavior: Behavior normal.             Significant Labs: All pertinent labs within the past 24 hours have been reviewed.  BMP:   Recent Labs   Lab 01/01/25  0401   GLU 77   *   K 3.9      CO2 23   BUN 11   CREATININE 1.1   CALCIUM 8.8   MG 1.7     CBC:   Recent Labs   Lab 12/31/24  1320 01/01/25  0401   WBC 13.50* 10.46   HGB 10.8* 10.2*   HCT 35.0* 34.4*    275       Significant Imaging: I have reviewed all pertinent imaging results/findings within the past 24 hours.

## 2025-01-01 NOTE — HOSPITAL COURSE
49-year-old gentleman with history of ulcerative colitis and c diff who presented with high frequency diarrhea described as clear in color without blood.  He has been seen by GI and currently it is felt that this represents recurrent C diff. He has been placed on Dificid beginning the evening of 1/2. He can be discharged on 1/4 if diarrhea continues to improve.  Outpatient GI office will continue to arrange his getting zinplava after discharge prior to end of dificid therapy.      While here, his C diff antigen was positive whereas it was previously negative during prior hospitalizations.  Toxin EIA was negative and PCR pending.     Regarding his ulcerative colitis, he has now received 2 doses of Tremfya as an outpatient.  Gastroenterology is not recommending changes to his ulcerative colitis regimen.      Wound care consulted for chronic wound.     Patient remained on fidaxomicin throughout his hospitalization.  Patient was seen and evaluated by Infectious Disease, Gastroenterology, and General surgery to evaluate for possible colectomy.  Patient did not show much improvement on fidaxomicin.  After discussion with General surgery, who was determined that patient is not yet a candidate for surgery secondary to nutritional status.  Patient was seen and evaluated on day of discharge.  Patient is okay for discharge at this time.  His labs have remained unremarkable, and he is hemodynamically stable.  Continues to have diarrhea, though this we will continue to improve with fidaxomicin.  Plans for patient to discharge to complete a 20 day course of fidaxomicin and to begin Bezlotuxumab as an outpatient to be set up by Gastroenterology.  Patient will follow up outpatient with id, primary care physician, GI, and General surgery.

## 2025-01-01 NOTE — ASSESSMENT & PLAN NOTE
We will order Reglan, discussed with the patient that he may choose to take it or not depending on which symptoms are most bothersome to him the nausea versus the diarrhea

## 2025-01-01 NOTE — PLAN OF CARE
Dc assessment completed. No change from last admission. Confirmed demographics. Will provide own transportation home.no hh/hd/bt/o2/neb    Formerly [facility]  Initial Discharge Assessment       Primary Care Provider: Hunter Aguilar III, MD    Admission Diagnosis: Colitis [K52.9]    Admission Date: 12/31/2024  Expected Discharge Date: 1/2/2025    Transition of Care Barriers: (P) None    Payor: [employer] / Plan: [employer] ([employer]) / Product Type: Commercial /     Extended Emergency Contact Information  Primary Emergency Contact: Estiven (Sister-In-Law)Jessica  Address: [address] [place] [address] [facility]  Home Phone: [phone]  Mobile Phone: [phone]  Relation: Sister  Preferred language: English   needed? No  Secondary Emergency Contact: Dallas Jean-Baptiste   [place]  Mobile Phone: [phone]  Relation: Brother  Preferred language: English   needed? No    Discharge Plan A: (P) Home  Discharge Plan B: (P) Home      [facility]  [address]  [facility] [address]  Phone: [phone] Fax: [phone]    [facility] - [place], [place] - [address]  [address] [facility] [address]  Phone: [phone] Fax: [phone]    [id number] - [place], [place] - [address] AT [facility] [address]  [address]  [place] [address]  Phone: [phone] Fax: [phone]      Initial Assessment (most recent)       Adult Discharge Assessment - 01/01/25 0959          Discharge Assessment    Assessment Type Discharge Planning Assessment     Confirmed/corrected address, phone number and insurance Yes     Confirmed Demographics Correct on Facesheet     Source of Information health record     Communicated GERARDO with patient/caregiver Date not available/Unable to determine     Reason For Admission colitis     People in Home alone     Do you  expect to return to your current living situation? Yes     Prior to hospitilization cognitive status: Alert/Oriented     Current cognitive status: Alert/Oriented     Equipment Currently Used at Home none (P)      Readmission within 30 days? Yes     Patient currently being followed by outpatient case management? No     Do you take prescription medications? Yes     Do you have prescription coverage? Yes     Coverage [facility]     Do you have any problems affording any of your prescribed medications? No     Is the patient taking medications as prescribed? yes     Who is going to help you get home at discharge? to provide own transportation (P)      How do you get to doctors appointments? car, drives self (P)      Are you on dialysis? No (P)      Do you take coumadin? No (P)      Discharge Plan A Home (P)      Discharge Plan B Home (P)      DME Needed Upon Discharge  none (P)      Discharge Plan discussed with: Patient (P)      Transition of Care Barriers None (P)

## 2025-01-01 NOTE — CONSULTS
Consult Note  Gastroenterology    Consult Requested By: hospital medicine  Reason for Consult: diarrhea ? Persistent left sided UC    SUBJECTIVE:     History of Present Illness:  Patient is a 49 y.o. male, well known to out service who has had persistent colitis sx's with mucous type diarrhea and LLQ abdominal pain.  He had colonoscopy 7/24 which revealed improviment on Ohmve but flex sig 8/24 suggested worsening colitis.  Pt also recently admitted for N/V and sx's of gastroparesis - he reports not much help with Reglan. He also was recently switched to a different IL - 23 inhibitor, Temfya earlier this month and is due for his second dose this week. He presents once again with similar sx's of worsening LLQ tenderness and diarrhea with mucous and some trace blood.   WBC 13.5, other labs normal.  GI infectious stool panel is negative, but C. Diff and CVM were not done. . Onset of symptoms was gradual starting 2 days ago with gradually worsening course since that time. Patient denies constipation, dysphagia, jaundice, melena, and vomiting. Symptoms are aggravated by none. Symptoms improve with none. Past history includes as above. Previous studies include colonoscopy and flexible sigmoidoscopy.     Review of patient's allergies indicates:  No Known Allergies    Past Medical History:   Diagnosis Date    C. difficile colitis     Cavitary pneumonia 11/2023    pos aspergillus serology    Diabetes mellitus 01/2022    Hyperlipidemia 2015    Hypertension 2015    Insomnia 2015    Ulcerative colitis      Past Surgical History:   Procedure Laterality Date    BRONCHOSCOPY WITH FLUOROSCOPY Left 11/20/2023    Procedure: BRONCHOSCOPY, WITH FLUOROSCOPY;  Surgeon: Lisa Villarreal MD;  Location: [facility] ENDO;  Service: Pulmonary;  Laterality: Left;    BRONCHOSCOPY WITH FLUOROSCOPY N/A 11/30/2023    Procedure: BRONCHOSCOPY, WITH FLUOROSCOPY;  Surgeon: Lisa Villarreal MD;  Location: [facility] ENDO;  Service: Pulmonary;  Laterality: N/A;     CARDIAC ELECTROPHYSIOLOGY MAPPING AND ABLATION  2017    SVT    CHOLECYSTECTOMY  2011    COLONOSCOPY N/A 5/3/2022    Procedure: COLONOSCOPY;  Surgeon: Shan Jean III, MD;  Location: [facility];  Service: Endoscopy;  Laterality: N/A;    COLONOSCOPY N/A 3/16/2024    Procedure: COLONOSCOPY;  Surgeon: ALEKSANDER Ramos MD;  Location: [facility];  Service: Endoscopy;  Laterality: N/A;    COLONOSCOPY WITH FECAL MICROBIOTA TRANSFER N/A 3/21/2023    Procedure: COLONOSCOPY, WITH FECAL MICROBIOTA TRANSFER;  Surgeon: David Millan MD;  Location: [facility];  Service: Endoscopy;  Laterality: N/A;    ESOPHAGOGASTRODUODENOSCOPY N/A 4/24/2023    Procedure: EGD (ESOPHAGOGASTRODUODENOSCOPY);  Surgeon: Shan Jean III, MD;  Location: [facility];  Service: Endoscopy;  Laterality: N/A;    ESOPHAGOGASTRODUODENOSCOPY N/A 6/5/2024    Procedure: EGD (ESOPHAGOGASTRODUODENOSCOPY);  Surgeon: Odalis Mccabe MD;  Location: [facility];  Service: Endoscopy;  Laterality: N/A;    FLEXIBLE SIGMOIDOSCOPY N/A 6/5/2024    Procedure: SIGMOIDOSCOPY, FLEXIBLE;  Surgeon: Odalis Mccabe MD;  Location: [facility];  Service: Endoscopy;  Laterality: N/A;    FLEXIBLE SIGMOIDOSCOPY N/A 7/16/2024    Procedure: SIGMOIDOSCOPY, FLEXIBLE;  Surgeon: Shan Jean III, MD;  Location: [facility];  Service: Endoscopy;  Laterality: N/A;    FLEXIBLE SIGMOIDOSCOPY N/A 8/13/2024    Procedure: SIGMOIDOSCOPY, FLEXIBLE;  Surgeon: Shan Jean III, MD;  Location: [facility];  Service: Endoscopy;  Laterality: N/A;    GANGLION CYST EXCISION Left 1992    UMBILICAL HERNIA REPAIR  2011    with mesh     Family History   Problem Relation Name Age of Onset    Asthma Mother       Social History     Tobacco Use    Smoking status: Former     Average packs/day: 1 pack/day for 30.0 years (30.0 ttl pk-yrs)     Types: Cigarettes     Start date: 1993    Smokeless tobacco: Never    Tobacco comments:     Pt states he has stopped smoking with Chantix three  weeks ago. 12/1/23   Substance Use Topics    Alcohol use: Yes     Comment: ocass    Drug use: Not Currently       Review of Systems:  GENERAL:  No weight loss, fever or chills.  HEENT:  No changes in vision, rhinorrhea, nasal congestion.  PULMONARY:  No shortness of breath, coughing or wheezing.  CARDIOVASCULAR:  No chest pain, palpitations or syncope.  GI:  No abdominal pain, nausea, vomiting, diarrhea or constipation.  :  No dysuria, urgency or frequency.  MUSCULOSKELETAL:  No joint pain or arthralgia.  SKIN:  No rashes or skin breakdown.  ENDOCRINE:  No polyuria or polydipsia. No thyroid disease.  NEURO/PSYCH:  No headaches or trouble with cognition. Stable mood.        OBJECTIVE:     Vital Signs (Most Recent)  Temp: 98 °F (36.7 °C) (01/01/25 1127)  Pulse: 75 (01/01/25 1127)  Resp: 18 (01/01/25 1208)  BP: 113/73 (01/01/25 1127)  SpO2: 96 % (01/01/25 1127)    Temperature Range Min/Max (Last 24H):  Temp:  [98 °F (36.7 °C)-98.4 °F (36.9 °C)]     Physical Exam:  GENERAL:  No apparent distress. Alert and oriented times three obese  HEENT:  PERRLA, EOMI. Conjunctivae intact. Posterior pharynx clear  NECK:  Supple with no lymphadenopathy or thyromegaly  LUNGS:  No respiratory distress. Clear to auscultation bilaterally with good air movement  CARDIAC:  RRR without murmur, rub or gallop  ABDOMEN:  Positive bowel sounds. Mild LLQ tenderness to deep palpation,  nondistended. No organomegaly  EXTREMITIES:  Peripheral pulses are 2+. Hands and feet are warm. Good capillary refill in fingers. No clubbing, cyanosis or edema  SKIN:  Skin color, texture and turgor normal. No rashes, ulcerations or nodules  NEUROLOGIC:  CN II-XII intact. Light touch sensation intact. Muscle strength 5/5 in all extremities     Laboratory:  I have reviewed all pertinent lab results within the past 24 hours.  CBC:   Recent Labs   Lab 01/01/25  0401   WBC 10.46   RBC 3.81*   HGB 10.2*   HCT 34.4*      MCV 90   MCH 26.8*   MCHC 29.7*     CMP:  "  Recent Labs   Lab 01/01/25  0401   GLU 77   CALCIUM 8.8   ALBUMIN 3.5   PROT 6.3   *   K 3.9   CO2 23      BUN 11   CREATININE 1.1   ALKPHOS 69   ALT 7*   AST 10   BILITOT 0.4     Coagulation: No results for input(s): "LABPROT", "INR", "APTT" in the last 168 hours.    Diagnostic Results:  CT: I have personally reviewed the report    Imaging Reports:  Imaging Results              X-Ray Chest AP Portable (Final result)  Result time 12/31/24 18:42:01      Final result by Mirta Gayle MD (12/31/24 18:42:01)                   Impression:      No acute findings.      Electronically signed by: Mirta Gayle  Date:    12/31/2024  Time:    18:42               Narrative:    EXAMINATION:  XR CHEST AP PORTABLE    CLINICAL HISTORY:  leukocytosis;    TECHNIQUE:  Single frontal view of the chest was performed.    COMPARISON:  11/27/2024    FINDINGS:  Lungs are clear. No focal consolidation. No pleural effusion. No pneumothorax. Normal heart size.                                       CT Abdomen Pelvis With IV Contrast NO Oral Contrast (Final result)  Result time 12/31/24 15:14:55      Final result by Samuel Boyle MD (12/31/24 15:14:55)                   Impression:      1. CT findings compatible with acute colitis as above, slightly worsened when compared to December 22, 2024.  No evidence of perforation or pneumatosis.  2. Additional stable findings as above.      Electronically signed by: Saumel Boyle  Date:    12/31/2024  Time:    15:14               Narrative:    EXAMINATION:  CT ABDOMEN PELVIS WITH IV CONTRAST    CLINICAL HISTORY:  Abdominal pain, acute, nonlocalized;.    TECHNIQUE:  Post infusion axial images were obtained from the lung bases to the pubic symphysis 100 cc nonionic contrast was utilized for the examination.    COMPARISON:  December 22, 2024    FINDINGS:  The lung bases are unremarkable.    No hepatic mass or contour abnormality is identified.  The gallbladder is " absent.  The biliary tree is nondilated.  The spleen, pancreas, and adrenal glands are normal.  Bilateral nonobstructing renal calculi are similar to the prior study, the largest of which is at the lower pole on the left measuring 7 mm.  There are no ureteral calculi and there is no hydronephrosis.  The abdominal aorta is normal in caliber.    Assessment of bowel structures reveals persistent mild abnormal circumferential wall thickening of the colon from the proximal transverse colon to the rectum.  Compared to the prior study, the degree of wall thickening has increased slightly, with slightly increasing pericolonic edema, most notably at the level of the sigmoid colon.  Findings are compatible with acute colitis.  There is no evidence of perforation, pneumatosis, or obstruction.  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    Images of the pelvis demonstrate a normal urinary bladder.  There is no drainable free fluid or lymphadenopathy.    No acute osseous abnormalities are identified.                                        ASSESSMENT/PLAN:     Patient is a 49 y.o. male, well known to out service who has had persistent colitis sx's with mucous type diarrhea and LLQ abdominal pain.  He had colonoscopy 7/24 which revealed improviment on Ohmve but flex sig 8/24 suggested worsening colitis.  Pt also recently admitted for N/V and sx's of gastroparesis - he reports not much help with Reglan. He also was recently switched to a different IL - 23 inhibitor, Temfya earlier this month and is due for his second dose this week. He presents once again with similar sx's of worsening LLQ tenderness and diarrhea with mucous and some trace blood.   WBC 13.5, other labs normal.  GI infectious stool panel is negative, but C. Diff and CVM were not done.  CT suggests ? Slight worsening of left sided colitis.  Imp - refractory UC, need updated C. Diff and CMV assays     Plan: will reorder C diff and CMV PCR -  if negative agree with short course of steroids - pt is just getting started on Tremfya - keep schedule for second dose..

## 2025-01-01 NOTE — PROGRESS NOTES
Pharmacist Renal Adjustment Note    Jacoby Jean-Baptiste is a 49 y.o. male being treated with Enoxaparin.     Patient Data:    Vital Signs (Most Recent):  Temp: 98.2 °F (36.8 °C) (12/31/24 2305)  Pulse: 86 (12/31/24 2305)  Resp: 14 (12/31/24 2305)  BP: 104/68 (12/31/24 2305)  SpO2: 95 % (12/31/24 2305) Vital Signs (72h Range):  Temp:  [98.1 °F (36.7 °C)-98.2 °F (36.8 °C)]   Pulse:  [72-86]   Resp:  [14-18]   BP: (104-161)/(59-88)   SpO2:  [95 %-100 %]      Recent Labs   Lab 12/31/24  1351   CREATININE 1.1     Serum creatinine: 1.1 mg/dL 12/31/24 1351  Estimated creatinine clearance: 102.4 mL/min  Body mass index is 48.76 kg/m².    Enoxaparin 40 mg every 24 hours will be changed to Enoxaparin 40 mg every 12 hours due to CrCl > 30 and BMI > 40 per Renal Dose Adjustment protocol.     Pharmacist's Name: Jessica Aguilar  Pharmacist's Extension: [phone]

## 2025-01-01 NOTE — PROGRESS NOTES
Former[facility], [facility] Medicine  Progress Note    Patient Name: Jacoby Jean-Baptiste  MRN: [id number]  Patient Class: OP- Observation   Admission Date: 12/31/2024  Length of Stay: 0 days  Attending Physician: Sofie Prasad MD  Primary Care Provider: Hunter Aguilar III, MD        Subjective     Principal Problem:Colitis        HPI:  50 yo male with PMH of ulcerative colitis presented because of worsening abdominal pain and diarrhea.  According to the patient, he has a h/o on and off diarrhea and abdominal cramp related to his ulcerative colitis.  He was recently admitted on 12/22 because of abdominal pain/nausea/vomiting/hematochezia.  At that time, his CT showed pan colitis, was seen by GI who suggested that his symptoms are likely not secondary to ulcerative colitis flare.  After his discharge, he started to have perfused diarrhea, and abdominal cramps/pain.  He is having anywhere between 10-15 bowel movements a day, some of them were hematochezia.  His abdominal pain is described as crampy, severe, 10/10 on pain scale.  He has been having chills with no fever. As a result, he decided to come back to the ER for evaluation.    In the ER, vitals showed blood pressure of 126/83, heart rate of 84, respiratory rate of 18, afebrile satting 99% on room air.  CBC with white count of 13.5, and hemoglobin of 10.8.  CBC with sodium of 134.  CRP is mildly elevated at 2.8. CT abdomen/pelvis showed findings compatible with acute colitis slightly worsened when compared to December 22, 2024. No evidence of perforation or pneumatosis.  He was given Reglan, Benadryl and Zofran.  He will be admitted to Medicine for further management.      Overview/Hospital Course:  49-year-old gentleman with history of ulcerative colitis with multiple admissions for abdominal cramping, diarrhea and hematochezia related to his ulcerative colitis diagnosis.  He was most recently in discharge just prior to Christmas.  Steroids were  deferred at that time due to patient without any diarrhea during his admission.  He did continue to have abdominal pain, however.  He also has evidence of gastroparesis and struggles with nausea and vomiting as well.  Presents with similar complaints on this admission particularly abdominal pain and diarrhea, several episodes per day.  Gastroenterologist is consulted from the emergency department and initially recommends inpatient evaluation for possible flexible sigmoidoscopy versus colonoscopy during inpatient.  Gastroenterologist to consulted on him on 01/01 we will like to move forward with C diff and CMP testing and possible steroid course depending.    Interval History:  Patient seen and examined this morning during rounds.  He continues to have abdominal pain.  He had several episodes of diarrhea prior to admission and complains of diarrhea today as well.  He denies any hematochezia at this time.  He is afebrile and hemodynamically stable.  He does report ongoing nausea with emesis depending on diet.  He reports little improvement with Reglan.  I discussed with him that we will order the Reglan and he may decide if you would like to take it or not.    Review of Systems   Constitutional:  Negative for chills and fever.   HENT:  Negative for sore throat.    Eyes:  Negative for visual disturbance.   Respiratory:  Negative for cough and shortness of breath.    Cardiovascular:  Negative for chest pain and palpitations.   Gastrointestinal:  Positive for abdominal pain, blood in stool and vomiting. Negative for nausea.   Endocrine: Negative for polydipsia and polyuria.   Genitourinary:  Negative for dysuria, frequency and urgency.   Musculoskeletal:  Negative for back pain.   Skin:  Negative for rash.   Neurological:  Negative for syncope.   Psychiatric/Behavioral:  Negative for confusion.      Objective:     Vital Signs (Most Recent):  Temp: 98 °F (36.7 °C) (01/01/25 1127)  Pulse: 75 (01/01/25 1127)  Resp: 18  (01/01/25 1208)  BP: 113/73 (01/01/25 1127)  SpO2: 96 % (01/01/25 1127) Vital Signs (24h Range):  Temp:  [98 °F (36.7 °C)-98.4 °F (36.9 °C)] 98 °F (36.7 °C)  Pulse:  [72-86] 75  Resp:  [14-18] 18  SpO2:  [95 %-100 %] 96 %  BP: (104-161)/(59-88) 113/73     Weight: 132.9 kg (293 lb)  Body mass index is 48.76 kg/m².    Intake/Output Summary (Last 24 hours) at 1/1/2025 1255  Last data filed at 1/1/2025 0832  Gross per 24 hour   Intake 435.74 ml   Output --   Net 435.74 ml         Physical Exam  Vitals reviewed.   Constitutional:       Appearance: Normal appearance.   HENT:      Head: Normocephalic and atraumatic.      Mouth/Throat:      Mouth: Mucous membranes are moist.   Eyes:      Extraocular Movements: Extraocular movements intact.      Conjunctiva/sclera: Conjunctivae normal.      Pupils: Pupils are equal, round, and reactive to light.   Cardiovascular:      Rate and Rhythm: Normal rate and regular rhythm.   Pulmonary:      Effort: Pulmonary effort is normal. No respiratory distress.      Breath sounds: Normal breath sounds. No wheezing.   Abdominal:      General: Abdomen is flat.      Palpations: Abdomen is soft.      Tenderness: There is no guarding or rebound.      Comments: Tenderness throughout   Musculoskeletal:         General: No swelling or tenderness.      Cervical back: Normal range of motion and neck supple.   Skin:     General: Skin is warm.   Neurological:      General: No focal deficit present.      Mental Status: He is alert and oriented to person, place, and time.   Psychiatric:         Mood and Affect: Mood normal.         Behavior: Behavior normal.             Significant Labs: All pertinent labs within the past 24 hours have been reviewed.  BMP:   Recent Labs   Lab 01/01/25  0401   GLU 77   *   K 3.9      CO2 23   BUN 11   CREATININE 1.1   CALCIUM 8.8   MG 1.7     CBC:   Recent Labs   Lab 12/31/24  1320 01/01/25  0401   WBC 13.50* 10.46   HGB 10.8* 10.2*   HCT 35.0* 34.4*     "275       Significant Imaging: I have reviewed all pertinent imaging results/findings within the past 24 hours.    Assessment and Plan     * Colitis  Infectious colitis versus ulcerative colitis flare.  CMV and c.diffi testing pending. If negative, will start steroids per GI. Dr. Jean considering felx sig vs colonsocopy while inpatient  Iv fluids for hydration, pain and nausea prn meds.      Leukocytosis  Secondary to colitis.  see colitis further plan of care.        Gastroparesis  We will order Reglan, discussed with the patient that he may choose to take it or not depending on which symptoms are most bothersome to him the nausea versus the diarrhea      DVT (deep venous thrombosis)  Resume Xarelto.  Patient's hemoglobin is as baseline, and he has hypercoagulable state with his history of ulcerative colitis.      BMI 45.0-49.9, adult  Body mass index is 48.76 kg/m². Morbid obesity complicates all aspects of disease management from diagnostic modalities to treatment. Weight loss encouraged and health benefits explained to patient.         History of supraventricular tachycardia  continue metoprolol.        VTE Risk Mitigation (From admission, onward)           Ordered     enoxaparin injection 40 mg  Every 12 hours        Note to Pharmacy: Ht: 5' 5" (1.651 m)  Wt: 132.9 kg (293 lb)  Estimated Creatinine Clearance: 102.4 mL/min (based on SCr of 1.1 mg/dL).  Body mass index is 48.76 kg/m².    12/31/24 2356     Reason for No Pharmacological VTE Prophylaxis  Once        Question:  Reasons:  Answer:  Active Bleeding    12/31/24 1801     IP VTE HIGH RISK PATIENT  Once         12/31/24 1801     Place sequential compression device  Until discontinued         12/31/24 1801                    Discharge Planning   GERARDO: 1/2/2025     Code Status: Full Code   Medical Readiness for Discharge Date:   Discharge Plan A: Home                        Sofie Prasad MD  Department of Hospital Medicine   [facility] " [facility]

## 2025-01-02 ENCOUNTER — TELEPHONE (OUTPATIENT)
Dept: FAMILY MEDICINE | Facility: CLINIC | Age: 50
End: 2025-01-02
Payer: COMMERCIAL

## 2025-01-02 LAB
ALBUMIN SERPL BCP-MCNC: 3.5 G/DL (ref 3.5–5.2)
ALP SERPL-CCNC: 69 U/L (ref 55–135)
ALT SERPL W/O P-5'-P-CCNC: 6 U/L (ref 10–44)
ANION GAP SERPL CALC-SCNC: 5 MMOL/L (ref 8–16)
AST SERPL-CCNC: 9 U/L (ref 10–40)
BASOPHILS # BLD AUTO: 0.06 K/UL (ref 0–0.2)
BASOPHILS NFR BLD: 0.6 % (ref 0–1.9)
BILIRUB SERPL-MCNC: 0.4 MG/DL (ref 0.1–1)
BUN SERPL-MCNC: 9 MG/DL (ref 6–20)
CALCIUM SERPL-MCNC: 8.6 MG/DL (ref 8.7–10.5)
CHLORIDE SERPL-SCNC: 106 MMOL/L (ref 95–110)
CO2 SERPL-SCNC: 25 MMOL/L (ref 23–29)
CREAT SERPL-MCNC: 1.2 MG/DL (ref 0.5–1.4)
DIFFERENTIAL METHOD BLD: ABNORMAL
EOSINOPHIL # BLD AUTO: 0.3 K/UL (ref 0–0.5)
EOSINOPHIL NFR BLD: 3.6 % (ref 0–8)
ERYTHROCYTE [DISTWIDTH] IN BLOOD BY AUTOMATED COUNT: 17.3 % (ref 11.5–14.5)
EST. GFR  (NO RACE VARIABLE): >60 ML/MIN/1.73 M^2
GLUCOSE SERPL-MCNC: 78 MG/DL (ref 70–110)
HCT VFR BLD AUTO: 35 % (ref 40–54)
HGB BLD-MCNC: 10.2 G/DL (ref 14–18)
IMM GRANULOCYTES # BLD AUTO: 0.05 K/UL (ref 0–0.04)
IMM GRANULOCYTES NFR BLD AUTO: 0.5 % (ref 0–0.5)
LYMPHOCYTES # BLD AUTO: 1.5 K/UL (ref 1–4.8)
LYMPHOCYTES NFR BLD: 16 % (ref 18–48)
MAGNESIUM SERPL-MCNC: 1.7 MG/DL (ref 1.6–2.6)
MCH RBC QN AUTO: 26.3 PG (ref 27–31)
MCHC RBC AUTO-ENTMCNC: 29.1 G/DL (ref 32–36)
MCV RBC AUTO: 90 FL (ref 82–98)
MONOCYTES # BLD AUTO: 1.2 K/UL (ref 0.3–1)
MONOCYTES NFR BLD: 12.4 % (ref 4–15)
NEUTROPHILS # BLD AUTO: 6.3 K/UL (ref 1.8–7.7)
NEUTROPHILS NFR BLD: 66.9 % (ref 38–73)
NRBC BLD-RTO: 0 /100 WBC
PLATELET # BLD AUTO: 278 K/UL (ref 150–450)
PMV BLD AUTO: 10.4 FL (ref 9.2–12.9)
POTASSIUM SERPL-SCNC: 4 MMOL/L (ref 3.5–5.1)
PROT SERPL-MCNC: 6.3 G/DL (ref 6–8.4)
RBC # BLD AUTO: 3.88 M/UL (ref 4.6–6.2)
SODIUM SERPL-SCNC: 136 MMOL/L (ref 136–145)
WBC # BLD AUTO: 9.4 K/UL (ref 3.9–12.7)

## 2025-01-02 PROCEDURE — 27000207 HC ISOLATION

## 2025-01-02 PROCEDURE — 99221 1ST HOSP IP/OBS SF/LOW 40: CPT | Mod: ,,, | Performed by: FAMILY MEDICINE

## 2025-01-02 PROCEDURE — 85025 COMPLETE CBC W/AUTO DIFF WBC: CPT | Performed by: HOSPITALIST

## 2025-01-02 PROCEDURE — 25000003 PHARM REV CODE 250: Performed by: HOSPITALIST

## 2025-01-02 PROCEDURE — 80053 COMPREHEN METABOLIC PANEL: CPT | Performed by: HOSPITALIST

## 2025-01-02 PROCEDURE — 25000003 PHARM REV CODE 250: Performed by: INTERNAL MEDICINE

## 2025-01-02 PROCEDURE — 63600175 PHARM REV CODE 636 W HCPCS: Performed by: INTERNAL MEDICINE

## 2025-01-02 PROCEDURE — 63600175 PHARM REV CODE 636 W HCPCS: Performed by: HOSPITALIST

## 2025-01-02 PROCEDURE — 36415 COLL VENOUS BLD VENIPUNCTURE: CPT | Performed by: HOSPITALIST

## 2025-01-02 PROCEDURE — 83735 ASSAY OF MAGNESIUM: CPT | Performed by: HOSPITALIST

## 2025-01-02 PROCEDURE — 12000002 HC ACUTE/MED SURGE SEMI-PRIVATE ROOM

## 2025-01-02 PROCEDURE — 25000003 PHARM REV CODE 250

## 2025-01-02 RX ORDER — FAMOTIDINE 20 MG/1
20 TABLET, FILM COATED ORAL 2 TIMES DAILY
Status: DISCONTINUED | OUTPATIENT
Start: 2025-01-02 | End: 2025-01-09 | Stop reason: HOSPADM

## 2025-01-02 RX ADMIN — MORPHINE SULFATE 2 MG: 2 INJECTION, SOLUTION INTRAMUSCULAR; INTRAVENOUS at 12:01

## 2025-01-02 RX ADMIN — PANTOPRAZOLE SODIUM 40 MG: 40 TABLET, DELAYED RELEASE ORAL at 06:01

## 2025-01-02 RX ADMIN — ZOLPIDEM TARTRATE 10 MG: 5 TABLET, COATED ORAL at 08:01

## 2025-01-02 RX ADMIN — FAMOTIDINE 20 MG: 20 TABLET ORAL at 08:01

## 2025-01-02 RX ADMIN — ACETAMINOPHEN 650 MG: 325 TABLET ORAL at 06:01

## 2025-01-02 RX ADMIN — BUSPIRONE HYDROCHLORIDE 15 MG: 5 TABLET ORAL at 03:01

## 2025-01-02 RX ADMIN — ENOXAPARIN SODIUM 40 MG: 40 INJECTION SUBCUTANEOUS at 08:01

## 2025-01-02 RX ADMIN — MORPHINE SULFATE 2 MG: 2 INJECTION, SOLUTION INTRAMUSCULAR; INTRAVENOUS at 06:01

## 2025-01-02 RX ADMIN — MORPHINE SULFATE 2 MG: 2 INJECTION, SOLUTION INTRAMUSCULAR; INTRAVENOUS at 02:01

## 2025-01-02 RX ADMIN — Medication 800 MG: at 05:01

## 2025-01-02 RX ADMIN — Medication 6 MG: at 02:01

## 2025-01-02 RX ADMIN — HYDROCODONE BITARTRATE AND ACETAMINOPHEN 1 TABLET: 5; 325 TABLET ORAL at 08:01

## 2025-01-02 RX ADMIN — ONDANSETRON 4 MG: 2 INJECTION INTRAMUSCULAR; INTRAVENOUS at 02:01

## 2025-01-02 RX ADMIN — SODIUM CHLORIDE: 9 INJECTION, SOLUTION INTRAVENOUS at 12:01

## 2025-01-02 RX ADMIN — METOCLOPRAMIDE 5 MG: 5 TABLET ORAL at 07:01

## 2025-01-02 RX ADMIN — METOCLOPRAMIDE 5 MG: 5 TABLET ORAL at 08:01

## 2025-01-02 RX ADMIN — HYOSCYAMINE SULFATE 0.25 MG: 0.12 TABLET SUBLINGUAL at 12:01

## 2025-01-02 RX ADMIN — ONDANSETRON 4 MG: 2 INJECTION INTRAMUSCULAR; INTRAVENOUS at 03:01

## 2025-01-02 RX ADMIN — DIPHENHYDRAMINE HYDROCHLORIDE 25 MG: 25 CAPSULE ORAL at 03:01

## 2025-01-02 RX ADMIN — MORPHINE SULFATE 2 MG: 2 INJECTION, SOLUTION INTRAMUSCULAR; INTRAVENOUS at 11:01

## 2025-01-02 RX ADMIN — DIPHENHYDRAMINE HYDROCHLORIDE 25 MG: 25 CAPSULE ORAL at 06:01

## 2025-01-02 RX ADMIN — Medication 800 MG: at 02:01

## 2025-01-02 RX ADMIN — ATORVASTATIN CALCIUM 40 MG: 40 TABLET, FILM COATED ORAL at 08:01

## 2025-01-02 RX ADMIN — HYOSCYAMINE SULFATE 0.25 MG: 0.12 TABLET SUBLINGUAL at 04:01

## 2025-01-02 RX ADMIN — BUSPIRONE HYDROCHLORIDE 15 MG: 5 TABLET ORAL at 08:01

## 2025-01-02 RX ADMIN — Medication 6 MG: at 08:01

## 2025-01-02 RX ADMIN — ONDANSETRON 4 MG: 2 INJECTION INTRAMUSCULAR; INTRAVENOUS at 10:01

## 2025-01-02 RX ADMIN — HYOSCYAMINE SULFATE 0.25 MG: 0.12 TABLET SUBLINGUAL at 11:01

## 2025-01-02 RX ADMIN — FIDAXOMICIN 200 MG: 200 TABLET, FILM COATED ORAL at 08:01

## 2025-01-02 NOTE — SUBJECTIVE & OBJECTIVE
Interval History:  The patient is having at least 10 clear liquid stools daily.  Still having abdominal discomfort.  No chest pain shortness a breath or lightheadedness.  No blood in stool.    Review of Systems  Objective:     Vital Signs (Most Recent):  Temp: 98.3 °F (36.8 °C) (01/02/25 1111)  Pulse: 69 (01/02/25 1111)  Resp: 18 (01/02/25 1217)  BP: 110/76 (01/02/25 1111)  SpO2: 96 % (01/02/25 1111) Vital Signs (24h Range):  Temp:  [98 °F (36.7 °C)-99.6 °F (37.6 °C)] 98.3 °F (36.8 °C)  Pulse:  [69-82] 69  Resp:  [10-20] 18  SpO2:  [94 %-96 %] 96 %  BP: (103-113)/(61-76) 110/76     Weight: 132.9 kg (293 lb)  Body mass index is 48.76 kg/m².    Intake/Output Summary (Last 24 hours) at 1/2/2025 1420  Last data filed at 1/2/2025 1257  Gross per 24 hour   Intake 0 ml   Output 200 ml   Net -200 ml         Physical Exam      NAD, A/O 3   RRR   CTAB   Mild diffuse tenderness without rebound or guarding, bowel sounds present   No edema     Significant Labs: All pertinent labs within the past 24 hours have been reviewed.  CBC:   Recent Labs   Lab 01/01/25  0401 01/02/25  0444   WBC 10.46 9.40   HGB 10.2* 10.2*   HCT 34.4* 35.0*    278     CMP:   Recent Labs   Lab 01/01/25  0401 01/02/25  0444   * 136   K 3.9 4.0    106   CO2 23 25   GLU 77 78   BUN 11 9   CREATININE 1.1 1.2   CALCIUM 8.8 8.6*   PROT 6.3 6.3   ALBUMIN 3.5 3.5   BILITOT 0.4 0.4   ALKPHOS 69 69   AST 10 9*   ALT 7* 6*   ANIONGAP 8 5*     Magnesium:   Recent Labs   Lab 01/01/25  0401 01/02/25  0444   MG 1.7 1.7       Significant Imaging: I have reviewed all pertinent imaging results/findings within the past 24 hours.

## 2025-01-02 NOTE — PROGRESS NOTES
[facility] Medicine  Progress Note    Patient Name: Jacoby Jean-Baptiste  MRN: [id number]  Patient Class: OP- Observation   Admission Date: 12/31/2024  Length of Stay: 0 days  Attending Physician: Kasandra Hicks MD  Primary Care Provider: Hunter Aguilar III, MD        Subjective     Principal Problem:Colitis        HPI:  50 yo male with PMH of ulcerative colitis presented because of worsening abdominal pain and diarrhea.  According to the patient, he has a h/o on and off diarrhea and abdominal cramp related to his ulcerative colitis.  He was recently admitted on 12/22 because of abdominal pain/nausea/vomiting/hematochezia.  At that time, his CT showed pan colitis, was seen by GI who suggested that his symptoms are likely not secondary to ulcerative colitis flare.  After his discharge, he started to have perfused diarrhea, and abdominal cramps/pain.  He is having anywhere between 10-15 bowel movements a day, some of them were hematochezia.  His abdominal pain is described as crampy, severe, 10/10 on pain scale.  He has been having chills with no fever. As a result, he decided to come back to the ER for evaluation.    In the ER, vitals showed blood pressure of 126/83, heart rate of 84, respiratory rate of 18, afebrile satting 99% on room air.  CBC with white count of 13.5, and hemoglobin of 10.8.  CBC with sodium of 134.  CRP is mildly elevated at 2.8. CT abdomen/pelvis showed findings compatible with acute colitis slightly worsened when compared to December 22, 2024. No evidence of perforation or pneumatosis.  He was given Reglan, Benadryl and Zofran.  He will be admitted to Medicine for further management.      Overview/Hospital Course:  49-year-old gentleman with history of ulcerative colitis with multiple admissions for abdominal cramping, diarrhea and hematochezia related to his ulcerative colitis diagnosis.  He was most recently in discharge just prior to Christmas.  Steroids were  deferred at that time due to patient without any diarrhea during his admission.  He did continue to have abdominal pain, however.  He also has evidence of gastroparesis and struggles with nausea and vomiting as well.  Presents with similar complaints on this admission particularly abdominal pain and diarrhea, several episodes per day.  Gastroenterologist is consulted from the emergency department and initially recommends inpatient evaluation for possible flexible sigmoidoscopy versus colonoscopy during inpatient.  Gastroenterologist to consulted on him on 01/01 we will like to move forward with C diff and CMP testing and possible steroid course depending.    Interval History:  The patient is having at least 10 clear liquid stools daily.  Still having abdominal discomfort.  No chest pain shortness a breath or lightheadedness.  No blood in stool.    Review of Systems  Objective:     Vital Signs (Most Recent):  Temp: 98.3 °F (36.8 °C) (01/02/25 1111)  Pulse: 69 (01/02/25 1111)  Resp: 18 (01/02/25 1217)  BP: 110/76 (01/02/25 1111)  SpO2: 96 % (01/02/25 1111) Vital Signs (24h Range):  Temp:  [98 °F (36.7 °C)-99.6 °F (37.6 °C)] 98.3 °F (36.8 °C)  Pulse:  [69-82] 69  Resp:  [10-20] 18  SpO2:  [94 %-96 %] 96 %  BP: (103-113)/(61-76) 110/76     Weight: 132.9 kg (293 lb)  Body mass index is 48.76 kg/m².    Intake/Output Summary (Last 24 hours) at 1/2/2025 1420  Last data filed at 1/2/2025 1257  Gross per 24 hour   Intake 0 ml   Output 200 ml   Net -200 ml         Physical Exam      NAD, A/O 3   RRR   CTAB   Mild diffuse tenderness without rebound or guarding, bowel sounds present   No edema     Significant Labs: All pertinent labs within the past 24 hours have been reviewed.  CBC:   Recent Labs   Lab 01/01/25  0401 01/02/25  0444   WBC 10.46 9.40   HGB 10.2* 10.2*   HCT 34.4* 35.0*    278     CMP:   Recent Labs   Lab 01/01/25  0401 01/02/25  0444   * 136   K 3.9 4.0    106   CO2 23 25   GLU 77 78   BUN 11 9  "  CREATININE 1.1 1.2   CALCIUM 8.8 8.6*   PROT 6.3 6.3   ALBUMIN 3.5 3.5   BILITOT 0.4 0.4   ALKPHOS 69 69   AST 10 9*   ALT 7* 6*   ANIONGAP 8 5*     Magnesium:   Recent Labs   Lab 01/01/25  0401 01/02/25  0444   MG 1.7 1.7       Significant Imaging: I have reviewed all pertinent imaging results/findings within the past 24 hours.    Assessment and Plan     * Colitis  -likely secondary to C diff. Dificid started by GI.  Follow.      Leukocytosis  Secondary to colitis.  see colitis further plan of care.        Gastroparesis  Continue symptom support    DVT (deep venous thrombosis)  Resume Xarelto.      BMI 45.0-49.9, adult  Body mass index is 48.76 kg/m². Morbid obesity complicates all aspects of disease management from diagnostic modalities to treatment. Weight loss encouraged and health benefits explained to patient.         History of supraventricular tachycardia  continue metoprolol.        VTE Risk Mitigation (From admission, onward)           Ordered     enoxaparin injection 40 mg  Every 12 hours        Note to Pharmacy: Ht: 5' 5" (1.651 m)  Wt: 132.9 kg (293 lb)  Estimated Creatinine Clearance: 102.4 mL/min (based on SCr of 1.1 mg/dL).  Body mass index is 48.76 kg/m².    12/31/24 2356     Reason for No Pharmacological VTE Prophylaxis  Once        Question:  Reasons:  Answer:  Active Bleeding    12/31/24 1801     IP VTE HIGH RISK PATIENT  Once         12/31/24 1801     Place sequential compression device  Until discontinued         12/31/24 1801                    Discharge Planning   GERARDO: 1/3/2025     Code Status: Full Code   Medical Readiness for Discharge Date:   Discharge Plan A: Home                        Kasandra Hicks MD  Department of Hospital Medicine   [facility]    "

## 2025-01-02 NOTE — PROGRESS NOTES
Patient Name: Jacoby Jean-Baptiste  Patient MRN: [id number]  Patient : 1975    Admit Date: 2024  Service date: 2025    Reason for Consult: diarrhea    PCP: Hunter Aguilar III, MD    S: pt reporting 10 + loose explosive watery bm per day similar to past cdiff requiring stool transplant then rebyota.  Reports some lower cramping, mild.  Has had 2 infusions of tremfya .     Inpatient Medications:   atorvastatin  40 mg Oral QHS    busPIRone  15 mg Oral TID    enoxparin  40 mg Subcutaneous Q12H (prophylaxis, 0900/2100)    EScitalopram oxalate  20 mg Oral Daily    hyoscyamine  0.25 mg Sublingual Q8H    metoclopramide HCl  5 mg Oral QID (PC + HS)    metoprolol succinate  50 mg Oral Daily    pantoprazole  40 mg Oral BID       Current Facility-Administered Medications:     acetaminophen, 650 mg, Oral, Q8H PRN    acetaminophen, 650 mg, Oral, Q4H PRN    aluminum-magnesium hydroxide-simethicone, 30 mL, Oral, QID PRN    dextrose 50%, 12.5 g, Intravenous, PRN    dextrose 50%, 25 g, Intravenous, PRN    diphenhydrAMINE, 25 mg, Oral, Q6H PRN    glucagon (human recombinant), 1 mg, Intramuscular, PRN    glucose, 16 g, Oral, PRN    glucose, 24 g, Oral, PRN    HYDROcodone-acetaminophen, 1 tablet, Oral, Q6H PRN    LORazepam, 0.5 mg, Oral, Q6H PRN    magnesium oxide, 800 mg, Oral, PRN    magnesium oxide, 800 mg, Oral, PRN    melatonin, 6 mg, Oral, Nightly PRN    morphine, 2 mg, Intravenous, Q2H PRN    naloxone, 0.02 mg, Intravenous, PRN    ondansetron, 4 mg, Intravenous, Q6H PRN    potassium bicarbonate, 35 mEq, Oral, PRN    potassium bicarbonate, 50 mEq, Oral, PRN    potassium bicarbonate, 60 mEq, Oral, PRN    potassium, sodium phosphates, 2 packet, Oral, PRN    potassium, sodium phosphates, 2 packet, Oral, PRN    potassium, sodium phosphates, 2 packet, Oral, PRN    sodium chloride 0.9%, 2 mL, Intravenous, Q12H PRN    zolpidem, 10 mg, Oral, Nightly PRN    Review of Systems:  A 10 point review of systems was performed and  "was normal, except as mentioned in the HPI, including constitutional, HEENT, heme, lymph, cardiovascular, respiratory, gastrointestinal, genitourinary, neurologic, endocrine, psychiatric and musculoskeletal.      OBJECTIVE:    Physical Exam:  24 Hour Vital Sign Ranges: Temp:  [98 °F (36.7 °C)-99.6 °F (37.6 °C)] 98.3 °F (36.8 °C)  Pulse:  [69-82] 69  Resp:  [10-20] 18  SpO2:  [94 %-96 %] 96 %  BP: (103-113)/(61-76) 110/76  Most recent vitals: /76 (BP Location: Left arm, Patient Position: Lying)   Pulse 69   Temp 98.3 °F (36.8 °C) (Oral)   Resp 18   Ht 5' 5" (1.651 m)   Wt 132.9 kg (293 lb)   SpO2 96%   BMI 48.76 kg/m²    GEN: well-developed, well-nourished, awake and alert, non-toxic appearing adult  HEENT: PERRL, sclera anicteric, oral mucosa pink and moist without lesion  NECK: trachea midline; Good ROM  CV: regular rate and rhythm, no murmurs or gallops  RESP: clear to auscultation bilaterally, no wheezes, rhonci or rales  ABD: soft, non-tender, non-distended, normal bowel sounds  EXT: no swelling or edema, 2+ pulses distally  SKIN: no rashes or jaundice  PSYCH: normal affect    Labs:   Recent Labs     12/31/24  1320 01/01/25  0401 01/02/25  0444   WBC 13.50* 10.46 9.40   MCV 86 90 90    275 278     Recent Labs     12/31/24  1351 01/01/25  0401 01/02/25  0444   * 133* 136   K 3.6 3.9 4.0   CL 99 102 106   CO2 27 23 25   BUN 12 11 9    77 78     No results for input(s): "ALB" in the last 72 hours.    Invalid input(s): "ALKP", "SGOT", "SGPT", "TBIL", "DBIL", "TPRO"  No results for input(s): "PT", "INR", "PTT" in the last 72 hours.      Radiology Review:  X-Ray Chest AP Portable   Final Result      No acute findings.         Electronically signed by: Mirta Gayle   Date:    12/31/2024   Time:    18:42      CT Abdomen Pelvis With IV Contrast NO Oral Contrast   Final Result      1. CT findings compatible with acute colitis as above, slightly worsened when compared to December 22, " 2024.  No evidence of perforation or pneumatosis.   2. Additional stable findings as above.         Electronically signed by: Samuel Boyle   Date:    12/31/2024   Time:    15:14            IMPRESSION / RECOMMENDATIONS:  Presumed recurrent cdiff in setting of severe ulcerative colitis  -start dificid.  Will likely need rebyota set up in clinic +/- stool transplant  -needs repeat cscope in near future with dr burton to see if any effect of tremfya otherwise probably needs colectomy and j pouch  -cdiff Ag + and tox negative but past Ag negative on several occasions.  PCR pending.  Fidaxomicin for now    Enoc Kebede  1/2/2025  2:15 PM

## 2025-01-02 NOTE — TELEPHONE ENCOUNTER
----- Message from  Melony sent at 1/2/2025  2:26 PM CST -----  Regarding: [facility] Staff  Pt expected to discharge today or tomorrow. Will need hospital follow up appointment. Please contact pt with appointment date and time. Thank you, Melony Sepulveda RN CM

## 2025-01-02 NOTE — PLAN OF CARE
1540- reached out to pharmacy inquiring cost on fidaxomicin 200mg BID x18 tabs.  Pharm stated it would need a PA. MD notified to send in medication to our pharmacy to get process started.     1429-[employer] message sent to [facility] requesting follow up appointment. Clinic to contact pt with appointment date and time.      01/02/25 1427   Discharge Reassessment   Did the patient's condition or plan change since previous assessment? No   Discharge Plan discussed with: Patient   Communicated GERARDO with patient/caregiver Yes   Post-Acute Status   Hospital Resources/Appts/Education Provided Appointments scheduled and added to AVS

## 2025-01-03 LAB
ALBUMIN SERPL BCP-MCNC: 3.4 G/DL (ref 3.5–5.2)
ALP SERPL-CCNC: 66 U/L (ref 55–135)
ALT SERPL W/O P-5'-P-CCNC: 6 U/L (ref 10–44)
ANION GAP SERPL CALC-SCNC: 5 MMOL/L (ref 8–16)
AST SERPL-CCNC: 8 U/L (ref 10–40)
BASOPHILS # BLD AUTO: 0.06 K/UL (ref 0–0.2)
BASOPHILS NFR BLD: 0.7 % (ref 0–1.9)
BILIRUB SERPL-MCNC: 0.4 MG/DL (ref 0.1–1)
BUN SERPL-MCNC: 7 MG/DL (ref 6–20)
CALCIUM SERPL-MCNC: 8.9 MG/DL (ref 8.7–10.5)
CHLORIDE SERPL-SCNC: 103 MMOL/L (ref 95–110)
CO2 SERPL-SCNC: 28 MMOL/L (ref 23–29)
CREAT SERPL-MCNC: 1.1 MG/DL (ref 0.5–1.4)
DIFFERENTIAL METHOD BLD: ABNORMAL
EOSINOPHIL # BLD AUTO: 0.4 K/UL (ref 0–0.5)
EOSINOPHIL NFR BLD: 4.4 % (ref 0–8)
ERYTHROCYTE [DISTWIDTH] IN BLOOD BY AUTOMATED COUNT: 17.1 % (ref 11.5–14.5)
EST. GFR  (NO RACE VARIABLE): >60 ML/MIN/1.73 M^2
GLUCOSE SERPL-MCNC: 85 MG/DL (ref 70–110)
HCT VFR BLD AUTO: 33.6 % (ref 40–54)
HGB BLD-MCNC: 9.9 G/DL (ref 14–18)
IMM GRANULOCYTES # BLD AUTO: 0.05 K/UL (ref 0–0.04)
IMM GRANULOCYTES NFR BLD AUTO: 0.6 % (ref 0–0.5)
LYMPHOCYTES # BLD AUTO: 1.2 K/UL (ref 1–4.8)
LYMPHOCYTES NFR BLD: 13.4 % (ref 18–48)
MAGNESIUM SERPL-MCNC: 1.6 MG/DL (ref 1.6–2.6)
MCH RBC QN AUTO: 26.1 PG (ref 27–31)
MCHC RBC AUTO-ENTMCNC: 29.5 G/DL (ref 32–36)
MCV RBC AUTO: 88 FL (ref 82–98)
MONOCYTES # BLD AUTO: 1 K/UL (ref 0.3–1)
MONOCYTES NFR BLD: 11.1 % (ref 4–15)
NEUTROPHILS # BLD AUTO: 6.3 K/UL (ref 1.8–7.7)
NEUTROPHILS NFR BLD: 69.8 % (ref 38–73)
NRBC BLD-RTO: 0 /100 WBC
PHOSPHATE SERPL-MCNC: 3.1 MG/DL (ref 2.7–4.5)
PLATELET # BLD AUTO: 253 K/UL (ref 150–450)
PMV BLD AUTO: 10 FL (ref 9.2–12.9)
POTASSIUM SERPL-SCNC: 3.7 MMOL/L (ref 3.5–5.1)
PROT SERPL-MCNC: 6.4 G/DL (ref 6–8.4)
RBC # BLD AUTO: 3.8 M/UL (ref 4.6–6.2)
SODIUM SERPL-SCNC: 136 MMOL/L (ref 136–145)
WBC # BLD AUTO: 8.94 K/UL (ref 3.9–12.7)

## 2025-01-03 PROCEDURE — 12000002 HC ACUTE/MED SURGE SEMI-PRIVATE ROOM

## 2025-01-03 PROCEDURE — 85025 COMPLETE CBC W/AUTO DIFF WBC: CPT | Performed by: HOSPITALIST

## 2025-01-03 PROCEDURE — 25000003 PHARM REV CODE 250: Performed by: INTERNAL MEDICINE

## 2025-01-03 PROCEDURE — 83735 ASSAY OF MAGNESIUM: CPT | Performed by: HOSPITALIST

## 2025-01-03 PROCEDURE — 80053 COMPREHEN METABOLIC PANEL: CPT | Performed by: HOSPITALIST

## 2025-01-03 PROCEDURE — 25000003 PHARM REV CODE 250: Performed by: HOSPITALIST

## 2025-01-03 PROCEDURE — 84100 ASSAY OF PHOSPHORUS: CPT | Performed by: INTERNAL MEDICINE

## 2025-01-03 PROCEDURE — 25000003 PHARM REV CODE 250

## 2025-01-03 PROCEDURE — 63600175 PHARM REV CODE 636 W HCPCS: Performed by: INTERNAL MEDICINE

## 2025-01-03 PROCEDURE — 27000207 HC ISOLATION

## 2025-01-03 PROCEDURE — 63600175 PHARM REV CODE 636 W HCPCS: Performed by: HOSPITALIST

## 2025-01-03 PROCEDURE — 36415 COLL VENOUS BLD VENIPUNCTURE: CPT | Performed by: HOSPITALIST

## 2025-01-03 RX ORDER — METOCLOPRAMIDE 5 MG/1
5 TABLET ORAL
Status: DISCONTINUED | OUTPATIENT
Start: 2025-01-03 | End: 2025-01-09 | Stop reason: HOSPADM

## 2025-01-03 RX ORDER — MORPHINE SULFATE 2 MG/ML
2 INJECTION, SOLUTION INTRAMUSCULAR; INTRAVENOUS EVERY 6 HOURS PRN
Status: DISCONTINUED | OUTPATIENT
Start: 2025-01-03 | End: 2025-01-09 | Stop reason: HOSPADM

## 2025-01-03 RX ORDER — ONDANSETRON HYDROCHLORIDE 2 MG/ML
4 INJECTION, SOLUTION INTRAVENOUS EVERY 6 HOURS PRN
Status: DISCONTINUED | OUTPATIENT
Start: 2025-01-03 | End: 2025-01-09 | Stop reason: HOSPADM

## 2025-01-03 RX ADMIN — FAMOTIDINE 20 MG: 20 TABLET ORAL at 07:01

## 2025-01-03 RX ADMIN — Medication 800 MG: at 06:01

## 2025-01-03 RX ADMIN — METOPROLOL SUCCINATE 50 MG: 50 TABLET, FILM COATED, EXTENDED RELEASE ORAL at 08:01

## 2025-01-03 RX ADMIN — BUSPIRONE HYDROCHLORIDE 15 MG: 5 TABLET ORAL at 01:01

## 2025-01-03 RX ADMIN — ONDANSETRON 4 MG: 2 INJECTION INTRAMUSCULAR; INTRAVENOUS at 03:01

## 2025-01-03 RX ADMIN — RIVAROXABAN 20 MG: 10 TABLET, FILM COATED ORAL at 06:01

## 2025-01-03 RX ADMIN — MORPHINE SULFATE 2 MG: 2 INJECTION, SOLUTION INTRAMUSCULAR; INTRAVENOUS at 09:01

## 2025-01-03 RX ADMIN — FIDAXOMICIN 200 MG: 200 TABLET, FILM COATED ORAL at 11:01

## 2025-01-03 RX ADMIN — ESCITALOPRAM OXALATE 20 MG: 10 TABLET ORAL at 09:01

## 2025-01-03 RX ADMIN — ZOLPIDEM TARTRATE 10 MG: 5 TABLET, COATED ORAL at 09:01

## 2025-01-03 RX ADMIN — FIDAXOMICIN 200 MG: 200 TABLET, FILM COATED ORAL at 09:01

## 2025-01-03 RX ADMIN — FAMOTIDINE 20 MG: 20 TABLET ORAL at 08:01

## 2025-01-03 RX ADMIN — POTASSIUM BICARBONATE 50 MEQ: 978 TABLET, EFFERVESCENT ORAL at 06:01

## 2025-01-03 RX ADMIN — HYDROCODONE BITARTRATE AND ACETAMINOPHEN 1 TABLET: 5; 325 TABLET ORAL at 01:01

## 2025-01-03 RX ADMIN — HYOSCYAMINE SULFATE 0.25 MG: 0.12 TABLET SUBLINGUAL at 08:01

## 2025-01-03 RX ADMIN — HYOSCYAMINE SULFATE 0.25 MG: 0.12 TABLET SUBLINGUAL at 01:01

## 2025-01-03 RX ADMIN — ATORVASTATIN CALCIUM 40 MG: 40 TABLET, FILM COATED ORAL at 07:01

## 2025-01-03 RX ADMIN — METOCLOPRAMIDE 5 MG: 5 TABLET ORAL at 01:01

## 2025-01-03 RX ADMIN — BUSPIRONE HYDROCHLORIDE 15 MG: 5 TABLET ORAL at 07:01

## 2025-01-03 RX ADMIN — DIPHENHYDRAMINE HYDROCHLORIDE 25 MG: 25 CAPSULE ORAL at 06:01

## 2025-01-03 RX ADMIN — DIPHENHYDRAMINE HYDROCHLORIDE 25 MG: 25 CAPSULE ORAL at 03:01

## 2025-01-03 RX ADMIN — ENOXAPARIN SODIUM 40 MG: 40 INJECTION SUBCUTANEOUS at 09:01

## 2025-01-03 RX ADMIN — HYDROCODONE BITARTRATE AND ACETAMINOPHEN 1 TABLET: 5; 325 TABLET ORAL at 03:01

## 2025-01-03 RX ADMIN — MORPHINE SULFATE 2 MG: 2 INJECTION, SOLUTION INTRAMUSCULAR; INTRAVENOUS at 07:01

## 2025-01-03 RX ADMIN — BUSPIRONE HYDROCHLORIDE 15 MG: 5 TABLET ORAL at 08:01

## 2025-01-03 RX ADMIN — HYDROCODONE BITARTRATE AND ACETAMINOPHEN 1 TABLET: 5; 325 TABLET ORAL at 07:01

## 2025-01-03 RX ADMIN — POTASSIUM & SODIUM PHOSPHATES POWDER PACK 280-160-250 MG 2 PACKET: 280-160-250 PACK at 06:01

## 2025-01-03 RX ADMIN — METOCLOPRAMIDE 5 MG: 5 TABLET ORAL at 09:01

## 2025-01-03 RX ADMIN — Medication 6 MG: at 09:01

## 2025-01-03 RX ADMIN — ACETAMINOPHEN 650 MG: 325 TABLET ORAL at 10:01

## 2025-01-03 NOTE — PROGRESS NOTES
Patient Name: Jacoby Jean-Baptiste  Patient MRN: [id number]  Patient : 1975    Admit Date: 2024  Service date: 1/3/2025    Reason for Consult: diarrhea    PCP: Hunter Aguilar III, MD    S: pt reporting 10 + loose explosive watery bm per day similar to past cdiff requiring stool transplant then rebyota.  Reports some lower cramping, mild.  Has had 2 infusions of tremfya .     1/3/2025 - 12+ stool episodes documented yesterday and 3 documented today.  Dificid started yesterday  Patient reports he was still taking pantoprazole at home.    Inpatient Medications:   atorvastatin  40 mg Oral QHS    busPIRone  15 mg Oral TID    enoxparin  40 mg Subcutaneous Q12H (prophylaxis, 0900/2100)    EScitalopram oxalate  20 mg Oral Daily    famotidine  20 mg Oral BID    fidaxomicin  200 mg Oral BID    hyoscyamine  0.25 mg Sublingual Q8H    metoclopramide HCl  5 mg Oral QID (PC + HS)    metoprolol succinate  50 mg Oral Daily       Current Facility-Administered Medications:     acetaminophen, 650 mg, Oral, Q8H PRN    acetaminophen, 650 mg, Oral, Q4H PRN    aluminum-magnesium hydroxide-simethicone, 30 mL, Oral, QID PRN    dextrose 50%, 12.5 g, Intravenous, PRN    dextrose 50%, 25 g, Intravenous, PRN    diphenhydrAMINE, 25 mg, Oral, Q6H PRN    glucagon (human recombinant), 1 mg, Intramuscular, PRN    glucose, 16 g, Oral, PRN    glucose, 24 g, Oral, PRN    HYDROcodone-acetaminophen, 1 tablet, Oral, Q6H PRN    magnesium oxide, 800 mg, Oral, PRN    magnesium oxide, 800 mg, Oral, PRN    melatonin, 6 mg, Oral, Nightly PRN    morphine, 2 mg, Intravenous, Q6H PRN    naloxone, 0.02 mg, Intravenous, PRN    ondansetron, 4 mg, Intravenous, Q6H PRN    potassium bicarbonate, 35 mEq, Oral, PRN    potassium bicarbonate, 50 mEq, Oral, PRN    potassium bicarbonate, 60 mEq, Oral, PRN    potassium, sodium phosphates, 2 packet, Oral, PRN    potassium, sodium phosphates, 2 packet, Oral, PRN    potassium, sodium phosphates, 2 packet, Oral, PRN     "sodium chloride 0.9%, 2 mL, Intravenous, Q12H PRN    zolpidem, 10 mg, Oral, Nightly PRN    Review of Systems:  A 10 point review of systems was performed and was normal, except as mentioned in the HPI, including constitutional, HEENT, heme, lymph, cardiovascular, respiratory, gastrointestinal, genitourinary, neurologic, endocrine, psychiatric and musculoskeletal.      OBJECTIVE:    Physical Exam:  24 Hour Vital Sign Ranges: Temp:  [98.2 °F (36.8 °C)-99.1 °F (37.3 °C)] 98.4 °F (36.9 °C)  Pulse:  [70-89] 73  Resp:  [15-19] 16  SpO2:  [93 %-97 %] 97 %  BP: ()/(53-84) 111/66  Most recent vitals: /66 (BP Location: Left arm, Patient Position: Lying)   Pulse 73   Temp 98.4 °F (36.9 °C) (Oral)   Resp 16   Ht 5' 5" (1.651 m)   Wt 132.9 kg (293 lb)   SpO2 97%   BMI 48.76 kg/m²    GEN: well-developed, well-nourished, awake and alert, non-toxic appearing adult  HEENT: PERRL, sclera anicteric, oral mucosa pink and moist without lesion  NECK: trachea midline; Good ROM  CV: regular rate and rhythm, no murmurs or gallops  RESP: clear to auscultation bilaterally, no wheezes, rhonci or rales  ABD: soft, non-tender, non-distended, normal bowel sounds  EXT: no swelling or edema, 2+ pulses distally  SKIN: no rashes or jaundice  PSYCH: normal affect    Labs:   Recent Labs     01/01/25  0401 01/02/25  0444 01/03/25  0835   WBC 10.46 9.40 8.94   MCV 90 90 88    278 253     Recent Labs     01/01/25  0401 01/02/25  0444 01/03/25  0835   * 136 136   K 3.9 4.0 3.7    106 103   CO2 23 25 28   BUN 11 9 7   GLU 77 78 85     No results for input(s): "ALB" in the last 72 hours.    Invalid input(s): "ALKP", "SGOT", "SGPT", "TBIL", "DBIL", "TPRO"  No results for input(s): "PT", "INR", "PTT" in the last 72 hours.      Radiology Review:  X-Ray Chest AP Portable   Final Result      No acute findings.         Electronically signed by: Mirta Gayle   Date:    12/31/2024   Time:    18:42      CT Abdomen Pelvis " With IV Contrast NO Oral Contrast   Final Result      1. CT findings compatible with acute colitis as above, slightly worsened when compared to December 22, 2024.  No evidence of perforation or pneumatosis.   2. Additional stable findings as above.         Electronically signed by: Samuel Boyle   Date:    12/31/2024   Time:    15:14            IMPRESSION / RECOMMENDATIONS:  Presumed recurrent cdiff in setting of severe ulcerative colitis  -start dificid.  Zinplava can be coordinated as outpatient  -needs repeat cscope in near future with dr burton to see if any effect of tremfya otherwise probably needs colectomy and j pouch  -cdiff Ag + and tox negative but past Ag negative on several occasions.  PCR pending.  Fidaxomicin for now  - pantoprazole was still being taken at home and needs to be discontinued in the setting of recurrent Clostridium difficile    Odalis Mccabe  1/3/2025  2:15 PM

## 2025-01-03 NOTE — NURSING
1/2/25 1937:pt aao. Respirations even and unlabored. No c/o sob. Hx chronic abd pain 7; pain med given last hour. Pt refused ivf's and bed alarm. Fall precautions in place. Nad was noted.  1/2/25 2126:ivf's infusing per pt request per md order.

## 2025-01-03 NOTE — ASSESSMENT & PLAN NOTE
-likely secondary to C diff. Dificid started by GI.  Follow.  -outpatient GI office will set up outpatient infusion of zinplava which is to occur prior to the end of therapy with dificid

## 2025-01-03 NOTE — CONSULTS
[facility]  Wound Care    Patient Name:  Jacoby Jean-Baptiste   MRN:  [id number]  Date: 1/2/2025  Diagnosis: Colitis    History:     Past Medical History:   Diagnosis Date    C. difficile colitis     Cavitary pneumonia 11/2023    pos aspergillus serology    Diabetes mellitus 01/2022    Hyperlipidemia 2015    Hypertension 2015    Insomnia 2015    Ulcerative colitis        Social History     Socioeconomic History    Marital status: Single   Tobacco Use    Smoking status: Former     Average packs/day: 1 pack/day for 30.0 years (30.0 ttl pk-yrs)     Types: Cigarettes     Start date: 1993    Smokeless tobacco: Never    Tobacco comments:     Pt states he has stopped smoking with Chantix three weeks ago. 12/1/23   Substance and Sexual Activity    Alcohol use: Yes     Comment: ocass    Drug use: Not Currently    Sexual activity: Not Currently     Social Drivers of Health     Financial Resource Strain: Low Risk  (1/1/2025)    Overall Financial Resource Strain (CARDIA)     Difficulty of Paying Living Expenses: Not very hard   Food Insecurity: No Food Insecurity (1/1/2025)    Hunger Vital Sign     Worried About Running Out of Food in the Last Year: Never true     Ran Out of Food in the Last Year: Never true   Transportation Needs: No Transportation Needs (1/1/2025)    TRANSPORTATION NEEDS     Transportation : No   Physical Activity: Inactive (12/27/2024)    Exercise Vital Sign     Days of Exercise per Week: 0 days     Minutes of Exercise per Session: 0 min   Stress: Stress Concern Present (1/1/2025)    [employer] [place] [employer] - Occupational Stress Questionnaire     Feeling of Stress : To some extent   Housing Stability: Low Risk  (1/1/2025)    Housing Stability Vital Sign     Unable to Pay for Housing in the Last Year: No     Homeless in the Last Year: No       Precautions:     Allergies as of 12/31/2024    (No Known Allergies)       WO Assessment Details/Treatment     Pt seen by Wound care for  consultation for left upper arm.  Pt currently under the care of Dr. Jimenes for wound.  No dressing noted to wound.  Wound noted with red, granular tissue.  No drainage noted.  No s/s infection noted.  Cleaned with wound cleanser.  Applied Aquacel Ag to wound.  Covered with foam dressing.     01/02/25 1110   WOCN Assessment   WOCN Total Time (mins) 30   Visit Date 01/02/25   Visit Time 1110   Consult Type New   WOCN Speciality Wound   Intervention assessed;changed;applied;chart review;orders   Teaching on-going        Wound 08/09/24 2255 Incision Left anterior;proximal;upper Arm other (see comments)   Date First Assessed/Time First Assessed: 08/09/24 2255   Present on Original Admission: Yes  Primary Wound Type: Incision  Side: Left  Orientation: anterior;proximal;upper  Location: Arm  Incision Type: (c) other (see comments)   Wound Image    Incision WDL ex   Dressing Appearance Open to air   Drainage Amount None   Drainage Characteristics/Odor No odor   Appearance [place];Red   Periwound Area Intact;Dry;Pink   Wound Edges Irregular   Care Cleansed with:;Wound cleanser   Dressing Silver;Hydrofiber;Foam   Periwound Care Cleansed with pH balanced cleanser;Dry periwound area maintained;Skin barrier film applied       Recommendations made to primary team f    Open surgical wound of the left arm--  Hydrofera blue to the left arm open wound daily    Orders placed.     01/02/2025

## 2025-01-03 NOTE — PLAN OF CARE
Problem: Adult Inpatient Plan of Care  Goal: Plan of Care Review  Outcome: Progressing  Goal: Patient-Specific Goal (Individualized)  Outcome: Progressing  Goal: Absence of Hospital-Acquired Illness or Injury  Outcome: Progressing  Intervention: Prevent Infection  Flowsheets (Taken 1/3/2025 1441)  Infection Prevention:   hand hygiene promoted   personal protective equipment utilized   single patient room provided   equipment surfaces disinfected  Goal: Optimal Comfort and Wellbeing  Outcome: Progressing  Intervention: Monitor Pain and Promote Comfort  Flowsheets (Taken 1/3/2025 1441)  Pain Management Interventions:   care clustered   relaxation techniques promoted   pain management plan reviewed with patient/caregiver  Intervention: Provide Person-Centered Care  Flowsheets (Taken 1/3/2025 1441)  Trust Relationship/Rapport:   care explained   choices provided   questions answered

## 2025-01-03 NOTE — PLAN OF CARE
Per MK at [facility], PA approved for Fidaxomicin 200mg BID. Patient has $25 co-pay and it will be delivered bedside today. Attending and GI notified; pt will DC on 1/4/25 if diarrhea improved.   Outpatient GI office will continue to work on setting up zinplava after the patient is discharged

## 2025-01-03 NOTE — CONSULTS
Chief complaint:  Abdominal Pain (Reports history of UC ), Vomiting, and Diarrhea      HPI:  Jacoby Jean-Baptiste is a 49 y.o. male presenting with Open surgical wound of the left arm that was POA. Pt is known to me from clinic, and the wound has been there for a few months. No other comkplaints today    PMH:  As per HPI and below:  Past Medical History:   Diagnosis Date    C. difficile colitis     Cavitary pneumonia 11/2023    pos aspergillus serology    Diabetes mellitus 01/2022    Hyperlipidemia 2015    Hypertension 2015    Insomnia 2015    Ulcerative colitis        Social History     Socioeconomic History    Marital status: Single   Tobacco Use    Smoking status: Former     Average packs/day: 1 pack/day for 30.0 years (30.0 ttl pk-yrs)     Types: Cigarettes     Start date: 1993    Smokeless tobacco: Never    Tobacco comments:     Pt states he has stopped smoking with Chantix three weeks ago. 12/1/23   Substance and Sexual Activity    Alcohol use: Yes     Comment: ocass    Drug use: Not Currently    Sexual activity: Not Currently     Social Drivers of Health     Financial Resource Strain: Low Risk  (1/1/2025)    Overall Financial Resource Strain (CARDIA)     Difficulty of Paying Living Expenses: Not very hard   Food Insecurity: No Food Insecurity (1/1/2025)    Hunger Vital Sign     Worried About Running Out of Food in the Last Year: Never true     Ran Out of Food in the Last Year: Never true   Transportation Needs: No Transportation Needs (1/1/2025)    TRANSPORTATION NEEDS     Transportation : No   Physical Activity: Inactive (12/27/2024)    Exercise Vital Sign     Days of Exercise per Week: 0 days     Minutes of Exercise per Session: 0 min   Stress: Stress Concern Present (1/1/2025)    [employer] [place] [employer] - Occupational Stress Questionnaire     Feeling of Stress : To some extent   Housing Stability: Low Risk  (1/1/2025)    Housing Stability Vital Sign     Unable to Pay for Housing in the Last  Year: No     Homeless in the Last Year: No       Past Surgical History:   Procedure Laterality Date    BRONCHOSCOPY WITH FLUOROSCOPY Left 11/20/2023    Procedure: BRONCHOSCOPY, WITH FLUOROSCOPY;  Surgeon: Lisa Villarreal MD;  Location: [facility];  Service: Pulmonary;  Laterality: Left;    BRONCHOSCOPY WITH FLUOROSCOPY N/A 11/30/2023    Procedure: BRONCHOSCOPY, WITH FLUOROSCOPY;  Surgeon: Lisa Villarreal MD;  Location: [facility];  Service: Pulmonary;  Laterality: N/A;    CARDIAC ELECTROPHYSIOLOGY MAPPING AND ABLATION  2017    SVT    CHOLECYSTECTOMY  2011    COLONOSCOPY N/A 5/3/2022    Procedure: COLONOSCOPY;  Surgeon: Shan Jean III, MD;  Location: [facility];  Service: Endoscopy;  Laterality: N/A;    COLONOSCOPY N/A 3/16/2024    Procedure: COLONOSCOPY;  Surgeon: ALEKSANDER Ramos MD;  Location: [facility];  Service: Endoscopy;  Laterality: N/A;    COLONOSCOPY WITH FECAL MICROBIOTA TRANSFER N/A 3/21/2023    Procedure: COLONOSCOPY, WITH FECAL MICROBIOTA TRANSFER;  Surgeon: David Millan MD;  Location: [facility];  Service: Endoscopy;  Laterality: N/A;    ESOPHAGOGASTRODUODENOSCOPY N/A 4/24/2023    Procedure: EGD (ESOPHAGOGASTRODUODENOSCOPY);  Surgeon: Shan Jean III, MD;  Location: [facility];  Service: Endoscopy;  Laterality: N/A;    ESOPHAGOGASTRODUODENOSCOPY N/A 6/5/2024    Procedure: EGD (ESOPHAGOGASTRODUODENOSCOPY);  Surgeon: Odalis Mccabe MD;  Location: [facility];  Service: Endoscopy;  Laterality: N/A;    FLEXIBLE SIGMOIDOSCOPY N/A 6/5/2024    Procedure: SIGMOIDOSCOPY, FLEXIBLE;  Surgeon: Odalis Mccabe MD;  Location: [facility] ENDO;  Service: Endoscopy;  Laterality: N/A;    FLEXIBLE SIGMOIDOSCOPY N/A 7/16/2024    Procedure: SIGMOIDOSCOPY, FLEXIBLE;  Surgeon: Shan Jean III, MD;  Location: [facility] ENDO;  Service: Endoscopy;  Laterality: N/A;    FLEXIBLE SIGMOIDOSCOPY N/A 8/13/2024    Procedure: SIGMOIDOSCOPY, FLEXIBLE;  Surgeon: Shan Jean III, MD;  Location: [facility]  ENDO;  Service: Endoscopy;  Laterality: N/A;    GANGLION CYST EXCISION Left 1992    UMBILICAL HERNIA REPAIR  2011    with mesh       Family History   Problem Relation Name Age of Onset    Asthma Mother         Review of patient's allergies indicates:  No Known Allergies    Current Facility-Administered Medications on File Prior to Encounter   Medication Dose Route Frequency Provider Last Rate Last Admin    lactated ringers infusion   Intravenous Continuous David Millan MD 75 mL/hr at 03/21/23 1252 New Bag at 03/21/23 1252     Current Outpatient Medications on File Prior to Encounter   Medication Sig Dispense Refill    budesonide (ENTOCORT EC) 3 mg capsule Take 3 capsules (9 mg total) by mouth once daily. 180 each 0    busPIRone (BUSPAR) 15 MG tablet Take 1 tablet (15 mg total) by mouth 3 (three) times daily. 90 tablet 5    colchicine (COLCRYS) 0.6 mg tablet Take 1 tablet (0.6 mg total) by mouth 2 (two) times daily. 180 tablet 0    EScitalopram oxalate (LEXAPRO) 20 MG tablet Take 1 tablet (20 mg total) by mouth once daily. 90 tablet 0    HYDROcodone-acetaminophen (NORCO) 7.5-325 mg per tablet Take 1 tablet by mouth every 6 (six) hours as needed for Pain. 20 tablet 0    hyoscyamine (LEVBID) 0.375 mg Tb12 Take 0.375 mg by mouth 2 (two) times daily.      LORazepam (ATIVAN) 0.5 MG tablet Take 1 tablet (0.5 mg total) by mouth every 6 (six) hours as needed for Anxiety. 90 tablet 5    magnesium oxide (MAG-OX) 400 mg (241.3 mg magnesium) tablet Take 1 tablet (400 mg total) by mouth 2 (two) times daily. 28 tablet 0    metoclopramide HCl (REGLAN) 5 MG tablet Take 1 tablet (5 mg total) by mouth every 8 (eight) hours as needed (take as needed before meals). 90 tablet 0    metoprolol succinate (TOPROL-XL) 50 MG 24 hr tablet Take 1 tablet (50 mg total) by mouth once daily. 90 tablet 1    pantoprazole (PROTONIX) 40 MG tablet Take 40 mg by mouth 2 (two) times daily.      promethazine (PHENERGAN) 25 MG tablet Take 25 mg by mouth  "4 (four) times daily as needed for Nausea.      rivaroxaban (XARELTO) 20 mg Tab Take 1 tablet (20 mg total) by mouth daily with dinner or evening meal. 90 tablet 0    simvastatin (ZOCOR) 20 MG tablet Take 1 tablet (20 mg total) by mouth every evening. 90 tablet 1    zolpidem (AMBIEN) 10 mg Tab Take 1 tablet (10 mg total) by mouth nightly as needed (insomnia). 30 tablet 2       ROS: As per HPI and below:  Pertinent items are noted in HPI.      Physical Exam:     Vitals:    25 1217 25 1533 25 1839 25 1917   BP:  116/66  122/84   BP Location:  Left arm     Patient Position:  Lying     Pulse:  70  82   Resp: 18 15 19    Temp:  98.2 °F (36.8 °C)  99.1 °F (37.3 °C)   TempSrc:  Oral  Oral   SpO2:  97%  (!) 93%   Weight:       Height:           BP  Min: 103/63  Max: 161/88  Temp  Av.4 °F (36.9 °C)  Min: 98 °F (36.7 °C)  Max: 99.6 °F (37.6 °C)  Pulse  Av.9  Min: 69  Max: 86  Resp  Av.7  Min: 10  Max: 20  SpO2  Av.1 %  Min: 93 %  Max: 100 %  Height  Av' 5" (165.1 cm)  Min: 5' 5" (165.1 cm)  Max: 5' 5" (165.1 cm)  Weight  Av.9 kg (293 lb)  Min: 132.9 kg (293 lb)  Max: 132.9 kg (293 lb)    Body mass index is 48.76 kg/m².          General:             Well developed, well nourished, no apparent distress  HEENT:              NCAT, no JVD, mucous membranes moist, EOM intact  Cardiovascular:  Regular rate and rhythm, normal S1, normal S2, No murmurs, rubs, or gallops  Respiratory:        Normal breath sounds, no wheezes, no rales, no rhonchi  Abdomen:           Bowel sounds present, non tender, non distended, no masses, no hepatojugular reflux  Extremities:        No clubbing, no cyanosis, no edema  Vascular:            2+ b/l radial.  Peripheral pulses intact.  No carotid bruits.  Neurological:      No focal deficits  Skin:                   No obvious rashes or erythema, Open surgical wound of the left arm      Lab Results   Component Value Date    WBC 9.40 2025    HGB " 10.2 (L) 01/02/2025    HCT 35.0 (L) 01/02/2025    MCV 90 01/02/2025     01/02/2025     Lab Results   Component Value Date    CHOL 162 11/01/2024    CHOL 128 09/25/2024    CHOL 142 10/27/2023     Lab Results   Component Value Date    HDL 48 11/01/2024    HDL 69 09/25/2024    HDL 44 10/27/2023     Lab Results   Component Value Date    LDLCALC 88.4 11/01/2024    LDLCALC 33.0 (L) 09/25/2024    LDLCALC 81.4 10/27/2023     Lab Results   Component Value Date    TRIG 128 11/01/2024    TRIG 130 09/25/2024    TRIG 83 10/27/2023     Lab Results   Component Value Date    CHOLHDL 29.6 11/01/2024    CHOLHDL 53.9 (H) 09/25/2024    CHOLHDL 31.0 10/27/2023     CMP  Recent Labs   Lab 01/02/25  0444   GLU 78   CALCIUM 8.6*   ALBUMIN 3.5   PROT 6.3      K 4.0   CO2 25      BUN 9   CREATININE 1.2   ALKPHOS 69   ALT 6*   AST 9*   BILITOT 0.4      Lab Results   Component Value Date    TSH 0.515 11/01/2024             Assessment and Recommendations     Diagnoses:    Open surgical wound of the left arm    Plan:  Hydrofera blue to the left arm open wound daily    Complexity:    moderate

## 2025-01-03 NOTE — PROGRESS NOTES
Formerly [facility], later [facility] Medicine  Progress Note    Patient Name: Jacoby Jean-Baptiste  MRN: [id number]  Patient Class: IP- Inpatient   Admission Date: 12/31/2024  Length of Stay: 1 days  Attending Physician: Kasandra Hicks MD  Primary Care Provider: Hunter Aguilar III, MD        Subjective     Principal Problem:Colitis        HPI:  48 yo male with PMH of ulcerative colitis presented because of worsening abdominal pain and diarrhea.  According to the patient, he has a h/o on and off diarrhea and abdominal cramp related to his ulcerative colitis.  He was recently admitted on 12/22 because of abdominal pain/nausea/vomiting/hematochezia.  At that time, his CT showed pan colitis, was seen by GI who suggested that his symptoms are likely not secondary to ulcerative colitis flare.  After his discharge, he started to have perfused diarrhea, and abdominal cramps/pain.  He is having anywhere between 10-15 bowel movements a day, some of them were hematochezia.  His abdominal pain is described as crampy, severe, 10/10 on pain scale.  He has been having chills with no fever. As a result, he decided to come back to the ER for evaluation.    In the ER, vitals showed blood pressure of 126/83, heart rate of 84, respiratory rate of 18, afebrile satting 99% on room air.  CBC with white count of 13.5, and hemoglobin of 10.8.  CBC with sodium of 134.  CRP is mildly elevated at 2.8. CT abdomen/pelvis showed findings compatible with acute colitis slightly worsened when compared to December 22, 2024. No evidence of perforation or pneumatosis.  He was given Reglan, Benadryl and Zofran.  He will be admitted to Medicine for further management.      Overview/Hospital Course:    49-year-old gentleman with history of ulcerative colitis and c diff who presented with high frequency diarrhea described as clear in color without blood.  He has been seen by GI and currently it is felt that this represents recurrent C diff. He has been  placed on Dificid beginning the evening of 1/2. He can be discharged on 1/4 if diarrhea continues to improve.  Outpatient GI office will continue to arrange his getting zinplava after discharge prior to end of dificid therapy.      While here, his C diff antigen was positive whereas it was previously negative during prior hospitalizations.  Toxin EIA was negative and PCR pending.     Regarding his ulcerative colitis, he has now received 2 doses of Tremfya as an outpatient.  Gastroenterology is not recommending changes to his ulcerative colitis regimen.      Wound care consulted for chronic wound.         Interval History:   Abdominal discomfort slightly improved.  Tolerating solid diet.  Number of episodes of diarrhea decreased slightly.    Review of Systems  Objective:     Vital Signs (Most Recent):  Temp: 98.4 °F (36.9 °C) (01/03/25 1109)  Pulse: 73 (01/03/25 1109)  Resp: 18 (01/03/25 1355)  BP: 111/66 (01/03/25 1109)  SpO2: 97 % (01/03/25 1109) Vital Signs (24h Range):  Temp:  [98.2 °F (36.8 °C)-99.1 °F (37.3 °C)] 98.4 °F (36.9 °C)  Pulse:  [70-89] 73  Resp:  [15-19] 18  SpO2:  [93 %-97 %] 97 %  BP: ()/(53-84) 111/66     Weight: 132.9 kg (293 lb)  Body mass index is 48.76 kg/m².    Intake/Output Summary (Last 24 hours) at 1/3/2025 1441  Last data filed at 1/3/2025 1237  Gross per 24 hour   Intake 610 ml   Output --   Net 610 ml         Physical Exam      NAD, A/O 3   RRR  CTAB   Soft, nondistended, bowel sounds present, mild diffuse tenderness without rebound or guarding   No edema     Significant Labs: All pertinent labs within the past 24 hours have been reviewed.  CBC:   Recent Labs   Lab 01/02/25  0444 01/03/25  0835   WBC 9.40 8.94   HGB 10.2* 9.9*   HCT 35.0* 33.6*    253     CMP:   Recent Labs   Lab 01/02/25  0444 01/03/25  0835    136   K 4.0 3.7    103   CO2 25 28   GLU 78 85   BUN 9 7   CREATININE 1.2 1.1   CALCIUM 8.6* 8.9   PROT 6.3 6.4   ALBUMIN 3.5 3.4*   BILITOT 0.4 0.4    ALKPHOS 69 66   AST 9* 8*   ALT 6* 6*   ANIONGAP 5* 5*     Magnesium:   Recent Labs   Lab 01/02/25  0444 01/03/25  0835   MG 1.7 1.6       Significant Imaging: I have reviewed all pertinent imaging results/findings within the past 24 hours.                Assessment and Plan     * Colitis  -likely secondary to C diff. Dificid started by GI.  Follow.  -outpatient GI office will set up outpatient infusion of zinplava which is to occur prior to the end of therapy with dificid      Leukocytosis  Secondary to colitis.  see colitis further plan of care.        Gastroparesis  Continue symptom support    DVT (deep venous thrombosis)  Resume Xarelto.      Hemoglobin stable.  No evidence of active bleeding.  The patient is not having hematochezia.    Open wound of left upper arm  Wound care consulted      Ulcerative colitis    The patient is on outpatient tremfya. Continue. GI consulted     BMI 45.0-49.9, adult  Body mass index is 48.76 kg/m². Morbid obesity complicates all aspects of disease management from diagnostic modalities to treatment. Weight loss encouraged and health benefits explained to patient.         History of supraventricular tachycardia  continue metoprolol.        VTE Risk Mitigation (From admission, onward)           Ordered     rivaroxaban tablet 20 mg  With dinner         01/03/25 1445     Reason for No Pharmacological VTE Prophylaxis  Once        Question:  Reasons:  Answer:  Active Bleeding    12/31/24 1801     IP VTE HIGH RISK PATIENT  Once         12/31/24 1801     Place sequential compression device  Until discontinued         12/31/24 1801                    Discharge Planning   GERARDO: 1/4/2025     Code Status: Full Code   Medical Readiness for Discharge Date:   Discharge Plan A: Home                        Kasandra Hicks MD  Department of Hospital Medicine   [facility]

## 2025-01-03 NOTE — SUBJECTIVE & OBJECTIVE
Interval History:   Abdominal discomfort slightly improved.  Tolerating solid diet.  Number of episodes of diarrhea decreased slightly.    Review of Systems  Objective:     Vital Signs (Most Recent):  Temp: 98.4 °F (36.9 °C) (01/03/25 1109)  Pulse: 73 (01/03/25 1109)  Resp: 18 (01/03/25 1355)  BP: 111/66 (01/03/25 1109)  SpO2: 97 % (01/03/25 1109) Vital Signs (24h Range):  Temp:  [98.2 °F (36.8 °C)-99.1 °F (37.3 °C)] 98.4 °F (36.9 °C)  Pulse:  [70-89] 73  Resp:  [15-19] 18  SpO2:  [93 %-97 %] 97 %  BP: ()/(53-84) 111/66     Weight: 132.9 kg (293 lb)  Body mass index is 48.76 kg/m².    Intake/Output Summary (Last 24 hours) at 1/3/2025 1441  Last data filed at 1/3/2025 1237  Gross per 24 hour   Intake 610 ml   Output --   Net 610 ml         Physical Exam      NAD, A/O 3   RRR  CTAB   Soft, nondistended, bowel sounds present, mild diffuse tenderness without rebound or guarding   No edema     Significant Labs: All pertinent labs within the past 24 hours have been reviewed.  CBC:   Recent Labs   Lab 01/02/25  0444 01/03/25  0835   WBC 9.40 8.94   HGB 10.2* 9.9*   HCT 35.0* 33.6*    253     CMP:   Recent Labs   Lab 01/02/25  0444 01/03/25  0835    136   K 4.0 3.7    103   CO2 25 28   GLU 78 85   BUN 9 7   CREATININE 1.2 1.1   CALCIUM 8.6* 8.9   PROT 6.3 6.4   ALBUMIN 3.5 3.4*   BILITOT 0.4 0.4   ALKPHOS 69 66   AST 9* 8*   ALT 6* 6*   ANIONGAP 5* 5*     Magnesium:   Recent Labs   Lab 01/02/25  0444 01/03/25  0835   MG 1.7 1.6       Significant Imaging: I have reviewed all pertinent imaging results/findings within the past 24 hours.

## 2025-01-04 LAB
ALBUMIN SERPL BCP-MCNC: 3.4 G/DL (ref 3.5–5.2)
ALP SERPL-CCNC: 60 U/L (ref 55–135)
ALT SERPL W/O P-5'-P-CCNC: 6 U/L (ref 10–44)
ANION GAP SERPL CALC-SCNC: 8 MMOL/L (ref 8–16)
AST SERPL-CCNC: 7 U/L (ref 10–40)
BASOPHILS # BLD AUTO: 0.04 K/UL (ref 0–0.2)
BASOPHILS NFR BLD: 0.4 % (ref 0–1.9)
BILIRUB SERPL-MCNC: 0.3 MG/DL (ref 0.1–1)
BUN SERPL-MCNC: 7 MG/DL (ref 6–20)
CALCIUM SERPL-MCNC: 8.8 MG/DL (ref 8.7–10.5)
CHLORIDE SERPL-SCNC: 102 MMOL/L (ref 95–110)
CO2 SERPL-SCNC: 27 MMOL/L (ref 23–29)
CREAT SERPL-MCNC: 1.1 MG/DL (ref 0.5–1.4)
DIFFERENTIAL METHOD BLD: ABNORMAL
EOSINOPHIL # BLD AUTO: 0.4 K/UL (ref 0–0.5)
EOSINOPHIL NFR BLD: 3.9 % (ref 0–8)
ERYTHROCYTE [DISTWIDTH] IN BLOOD BY AUTOMATED COUNT: 17.1 % (ref 11.5–14.5)
EST. GFR  (NO RACE VARIABLE): >60 ML/MIN/1.73 M^2
GLUCOSE SERPL-MCNC: 105 MG/DL (ref 70–110)
HCT VFR BLD AUTO: 33.2 % (ref 40–54)
HGB BLD-MCNC: 10.3 G/DL (ref 14–18)
IMM GRANULOCYTES # BLD AUTO: 0.04 K/UL (ref 0–0.04)
IMM GRANULOCYTES NFR BLD AUTO: 0.4 % (ref 0–0.5)
LYMPHOCYTES # BLD AUTO: 1 K/UL (ref 1–4.8)
LYMPHOCYTES NFR BLD: 9.1 % (ref 18–48)
MAGNESIUM SERPL-MCNC: 1.5 MG/DL (ref 1.6–2.6)
MCH RBC QN AUTO: 26.6 PG (ref 27–31)
MCHC RBC AUTO-ENTMCNC: 31 G/DL (ref 32–36)
MCV RBC AUTO: 86 FL (ref 82–98)
MONOCYTES # BLD AUTO: 1.2 K/UL (ref 0.3–1)
MONOCYTES NFR BLD: 11.6 % (ref 4–15)
NEUTROPHILS # BLD AUTO: 7.8 K/UL (ref 1.8–7.7)
NEUTROPHILS NFR BLD: 74.6 % (ref 38–73)
NRBC BLD-RTO: 0 /100 WBC
PLATELET # BLD AUTO: 278 K/UL (ref 150–450)
PMV BLD AUTO: 9.7 FL (ref 9.2–12.9)
POTASSIUM SERPL-SCNC: 3.9 MMOL/L (ref 3.5–5.1)
PROT SERPL-MCNC: 6.2 G/DL (ref 6–8.4)
RBC # BLD AUTO: 3.87 M/UL (ref 4.6–6.2)
SODIUM SERPL-SCNC: 137 MMOL/L (ref 136–145)
WBC # BLD AUTO: 10.42 K/UL (ref 3.9–12.7)

## 2025-01-04 PROCEDURE — 27000207 HC ISOLATION

## 2025-01-04 PROCEDURE — 25000003 PHARM REV CODE 250: Performed by: INTERNAL MEDICINE

## 2025-01-04 PROCEDURE — 12000002 HC ACUTE/MED SURGE SEMI-PRIVATE ROOM

## 2025-01-04 PROCEDURE — 36415 COLL VENOUS BLD VENIPUNCTURE: CPT | Performed by: HOSPITALIST

## 2025-01-04 PROCEDURE — 83735 ASSAY OF MAGNESIUM: CPT | Performed by: HOSPITALIST

## 2025-01-04 PROCEDURE — 63600175 PHARM REV CODE 636 W HCPCS: Performed by: INTERNAL MEDICINE

## 2025-01-04 PROCEDURE — 85025 COMPLETE CBC W/AUTO DIFF WBC: CPT | Performed by: HOSPITALIST

## 2025-01-04 PROCEDURE — 25000003 PHARM REV CODE 250: Performed by: HOSPITALIST

## 2025-01-04 PROCEDURE — 87324 CLOSTRIDIUM AG IA: CPT | Performed by: STUDENT IN AN ORGANIZED HEALTH CARE EDUCATION/TRAINING PROGRAM

## 2025-01-04 PROCEDURE — 25000003 PHARM REV CODE 250

## 2025-01-04 PROCEDURE — 80053 COMPREHEN METABOLIC PANEL: CPT | Performed by: HOSPITALIST

## 2025-01-04 PROCEDURE — 87493 C DIFF AMPLIFIED PROBE: CPT | Performed by: STUDENT IN AN ORGANIZED HEALTH CARE EDUCATION/TRAINING PROGRAM

## 2025-01-04 RX ADMIN — HYDROCODONE BITARTRATE AND ACETAMINOPHEN 1 TABLET: 5; 325 TABLET ORAL at 07:01

## 2025-01-04 RX ADMIN — MORPHINE SULFATE 2 MG: 2 INJECTION, SOLUTION INTRAMUSCULAR; INTRAVENOUS at 03:01

## 2025-01-04 RX ADMIN — METOPROLOL SUCCINATE 50 MG: 50 TABLET, FILM COATED, EXTENDED RELEASE ORAL at 08:01

## 2025-01-04 RX ADMIN — ONDANSETRON 4 MG: 2 INJECTION INTRAMUSCULAR; INTRAVENOUS at 06:01

## 2025-01-04 RX ADMIN — Medication 6 MG: at 09:01

## 2025-01-04 RX ADMIN — RIVAROXABAN 20 MG: 10 TABLET, FILM COATED ORAL at 04:01

## 2025-01-04 RX ADMIN — Medication 800 MG: at 08:01

## 2025-01-04 RX ADMIN — Medication 800 MG: at 01:01

## 2025-01-04 RX ADMIN — BUSPIRONE HYDROCHLORIDE 15 MG: 5 TABLET ORAL at 08:01

## 2025-01-04 RX ADMIN — HYOSCYAMINE SULFATE 0.25 MG: 0.12 TABLET SUBLINGUAL at 07:01

## 2025-01-04 RX ADMIN — MORPHINE SULFATE 2 MG: 2 INJECTION, SOLUTION INTRAMUSCULAR; INTRAVENOUS at 04:01

## 2025-01-04 RX ADMIN — ATORVASTATIN CALCIUM 40 MG: 40 TABLET, FILM COATED ORAL at 09:01

## 2025-01-04 RX ADMIN — MORPHINE SULFATE 2 MG: 2 INJECTION, SOLUTION INTRAMUSCULAR; INTRAVENOUS at 11:01

## 2025-01-04 RX ADMIN — ESCITALOPRAM OXALATE 20 MG: 10 TABLET ORAL at 08:01

## 2025-01-04 RX ADMIN — DIPHENHYDRAMINE HYDROCHLORIDE 25 MG: 25 CAPSULE ORAL at 11:01

## 2025-01-04 RX ADMIN — ONDANSETRON 4 MG: 2 INJECTION INTRAMUSCULAR; INTRAVENOUS at 02:01

## 2025-01-04 RX ADMIN — HYOSCYAMINE SULFATE 0.25 MG: 0.12 TABLET SUBLINGUAL at 02:01

## 2025-01-04 RX ADMIN — ZOLPIDEM TARTRATE 10 MG: 5 TABLET, COATED ORAL at 09:01

## 2025-01-04 RX ADMIN — BUSPIRONE HYDROCHLORIDE 15 MG: 5 TABLET ORAL at 04:01

## 2025-01-04 RX ADMIN — MORPHINE SULFATE 2 MG: 2 INJECTION, SOLUTION INTRAMUSCULAR; INTRAVENOUS at 10:01

## 2025-01-04 RX ADMIN — BUSPIRONE HYDROCHLORIDE 15 MG: 5 TABLET ORAL at 09:01

## 2025-01-04 RX ADMIN — FIDAXOMICIN 200 MG: 200 TABLET, FILM COATED ORAL at 09:01

## 2025-01-04 RX ADMIN — HYDROCODONE BITARTRATE AND ACETAMINOPHEN 1 TABLET: 5; 325 TABLET ORAL at 01:01

## 2025-01-04 RX ADMIN — DIPHENHYDRAMINE HYDROCHLORIDE 25 MG: 25 CAPSULE ORAL at 02:01

## 2025-01-04 RX ADMIN — ONDANSETRON 4 MG: 2 INJECTION INTRAMUSCULAR; INTRAVENOUS at 08:01

## 2025-01-04 RX ADMIN — HYOSCYAMINE SULFATE 0.25 MG: 0.12 TABLET SUBLINGUAL at 04:01

## 2025-01-04 RX ADMIN — FIDAXOMICIN 200 MG: 200 TABLET, FILM COATED ORAL at 08:01

## 2025-01-04 RX ADMIN — DIPHENHYDRAMINE HYDROCHLORIDE 25 MG: 25 CAPSULE ORAL at 01:01

## 2025-01-04 NOTE — PROGRESS NOTES
[facility] Medicine  Progress Note    Patient Name: Jacoby Jean-Baptiste  MRN: [id number]  Patient Class: IP- Inpatient   Admission Date: 12/31/2024  Length of Stay: 2 days  Attending Physician: Adrian Estrada DO  Primary Care Provider: Hunter Aguilar III, MD        Subjective     Principal Problem:Colitis        HPI:  50 yo male with PMH of ulcerative colitis presented because of worsening abdominal pain and diarrhea.  According to the patient, he has a h/o on and off diarrhea and abdominal cramp related to his ulcerative colitis.  He was recently admitted on 12/22 because of abdominal pain/nausea/vomiting/hematochezia.  At that time, his CT showed pan colitis, was seen by GI who suggested that his symptoms are likely not secondary to ulcerative colitis flare.  After his discharge, he started to have perfused diarrhea, and abdominal cramps/pain.  He is having anywhere between 10-15 bowel movements a day, some of them were hematochezia.  His abdominal pain is described as crampy, severe, 10/10 on pain scale.  He has been having chills with no fever. As a result, he decided to come back to the ER for evaluation.    In the ER, vitals showed blood pressure of 126/83, heart rate of 84, respiratory rate of 18, afebrile satting 99% on room air.  CBC with white count of 13.5, and hemoglobin of 10.8.  CBC with sodium of 134.  CRP is mildly elevated at 2.8. CT abdomen/pelvis showed findings compatible with acute colitis slightly worsened when compared to December 22, 2024. No evidence of perforation or pneumatosis.  He was given Reglan, Benadryl and Zofran.  He will be admitted to Medicine for further management.      Overview/Hospital Course:      49-year-old gentleman with history of ulcerative colitis and c diff who presented with high frequency diarrhea described as clear in color without blood.  He has been seen by GI and currently it is felt that this represents recurrent C diff. He has been  placed on Dificid beginning the evening of 1/2. He can be discharged on 1/4 if diarrhea continues to improve.  Outpatient GI office will continue to arrange his getting zinplava after discharge prior to end of dificid therapy.      While here, his C diff antigen was positive whereas it was previously negative during prior hospitalizations.  Toxin EIA was negative and PCR pending.     Regarding his ulcerative colitis, he has now received 2 doses of Tremfya as an outpatient.  Gastroenterology is not recommending changes to his ulcerative colitis regimen.      Wound care consulted for chronic wound.       Interval History:  Patient was seen and examined at bedside this morning.  He reports that he is feeling worse today.  He has continued to have worsening diarrhea, and is now having nausea as well.  Continue Dificid.  PCR pending.    Review of Systems  Objective:     Vital Signs (Most Recent):  Temp: 99.1 °F (37.3 °C) (01/04/25 1532)  Pulse: 72 (01/04/25 1532)  Resp: 16 (01/04/25 1647)  BP: 111/73 (01/04/25 1532)  SpO2: 95 % (01/04/25 1532) Vital Signs (24h Range):  Temp:  [98.4 °F (36.9 °C)-99.4 °F (37.4 °C)] 99.1 °F (37.3 °C)  Pulse:  [72-82] 72  Resp:  [15-20] 16  SpO2:  [94 %-97 %] 95 %  BP: (107-119)/(63-74) 111/73     Weight: 132.9 kg (293 lb)  Body mass index is 48.76 kg/m².    Intake/Output Summary (Last 24 hours) at 1/4/2025 1733  Last data filed at 1/4/2025 1728  Gross per 24 hour   Intake 240 ml   Output --   Net 240 ml         Physical Exam      NAD, A/O 3   RRR  CTAB   Soft, nondistended, bowel sounds present, mild diffuse tenderness without rebound or guarding   No edema     Significant Labs: All pertinent labs within the past 24 hours have been reviewed.  CBC:   Recent Labs   Lab 01/03/25  0835 01/04/25  0701   WBC 8.94 10.42   HGB 9.9* 10.3*   HCT 33.6* 33.2*    278     CMP:   Recent Labs   Lab 01/03/25  0835 01/04/25  0701    137   K 3.7 3.9    102   CO2 28 27   GLU 85 105   BUN 7 7    CREATININE 1.1 1.1   CALCIUM 8.9 8.8   PROT 6.4 6.2   ALBUMIN 3.4* 3.4*   BILITOT 0.4 0.3   ALKPHOS 66 60   AST 8* 7*   ALT 6* 6*   ANIONGAP 5* 8     Magnesium:   Recent Labs   Lab 01/03/25  0835 01/04/25  0701   MG 1.6 1.5*       Significant Imaging: I have reviewed all pertinent imaging results/findings within the past 24 hours.  Imaging Results              X-Ray Chest AP Portable (Final result)  Result time 12/31/24 18:42:01      Final result by Mirta Gayle MD (12/31/24 18:42:01)                   Impression:      No acute findings.      Electronically signed by: Mirta Gayle  Date:    12/31/2024  Time:    18:42               Narrative:    EXAMINATION:  XR CHEST AP PORTABLE    CLINICAL HISTORY:  leukocytosis;    TECHNIQUE:  Single frontal view of the chest was performed.    COMPARISON:  11/27/2024    FINDINGS:  Lungs are clear. No focal consolidation. No pleural effusion. No pneumothorax. Normal heart size.                                       CT Abdomen Pelvis With IV Contrast NO Oral Contrast (Final result)  Result time 12/31/24 15:14:55      Final result by Samuel Boyle MD (12/31/24 15:14:55)                   Impression:      1. CT findings compatible with acute colitis as above, slightly worsened when compared to December 22, 2024.  No evidence of perforation or pneumatosis.  2. Additional stable findings as above.      Electronically signed by: Samuel Boyle  Date:    12/31/2024  Time:    15:14               Narrative:    EXAMINATION:  CT ABDOMEN PELVIS WITH IV CONTRAST    CLINICAL HISTORY:  Abdominal pain, acute, nonlocalized;.    TECHNIQUE:  Post infusion axial images were obtained from the lung bases to the pubic symphysis 100 cc nonionic contrast was utilized for the examination.    COMPARISON:  December 22, 2024    FINDINGS:  The lung bases are unremarkable.    No hepatic mass or contour abnormality is identified.  The gallbladder is absent.  The biliary tree is  nondilated.  The spleen, pancreas, and adrenal glands are normal.  Bilateral nonobstructing renal calculi are similar to the prior study, the largest of which is at the lower pole on the left measuring 7 mm.  There are no ureteral calculi and there is no hydronephrosis.  The abdominal aorta is normal in caliber.    Assessment of bowel structures reveals persistent mild abnormal circumferential wall thickening of the colon from the proximal transverse colon to the rectum.  Compared to the prior study, the degree of wall thickening has increased slightly, with slightly increasing pericolonic edema, most notably at the level of the sigmoid colon.  Findings are compatible with acute colitis.  There is no evidence of perforation, pneumatosis, or obstruction.  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    Images of the pelvis demonstrate a normal urinary bladder.  There is no drainable free fluid or lymphadenopathy.    No acute osseous abnormalities are identified.                                        Assessment and Plan     * Colitis  -likely secondary to C diff. Dificid started by GI.  Follow.  -outpatient GI office will set up outpatient infusion of zinplava which is to occur prior to the end of therapy with dificid    Leukocytosis  Secondary to colitis.  see colitis further plan of care.        Gastroparesis  Continue symptom support    DVT (deep venous thrombosis)  Resume Xarelto.      Hemoglobin stable.  No evidence of active bleeding.  The patient is not having hematochezia.    Open wound of left upper arm  Wound care consulted      Ulcerative colitis    The patient is on outpatient tremfya. Continue. GI consulted     BMI 45.0-49.9, adult  Body mass index is 48.76 kg/m². Morbid obesity complicates all aspects of disease management from diagnostic modalities to treatment. Weight loss encouraged and health benefits explained to patient.         History of supraventricular  tachycardia  continue metoprolol.        VTE Risk Mitigation (From admission, onward)           Ordered     rivaroxaban tablet 20 mg  With dinner         01/03/25 1445     Reason for No Pharmacological VTE Prophylaxis  Once        Question:  Reasons:  Answer:  Active Bleeding    12/31/24 1801     IP VTE HIGH RISK PATIENT  Once         12/31/24 1801     Place sequential compression device  Until discontinued         12/31/24 1801                    Discharge Planning   GERARDO: 1/8/2025     Code Status: Full Code   Medical Readiness for Discharge Date:   Discharge Plan A: Home                        Adrian Estrada DO  Department of Hospital Medicine   [facility]

## 2025-01-04 NOTE — SUBJECTIVE & OBJECTIVE
Interval History:  Patient was seen and examined at bedside this morning.  He reports that he is feeling worse today.  He has continued to have worsening diarrhea, and is now having nausea as well.  Continue Dificid.  PCR pending.    Review of Systems  Objective:     Vital Signs (Most Recent):  Temp: 99.1 °F (37.3 °C) (01/04/25 1532)  Pulse: 72 (01/04/25 1532)  Resp: 16 (01/04/25 1647)  BP: 111/73 (01/04/25 1532)  SpO2: 95 % (01/04/25 1532) Vital Signs (24h Range):  Temp:  [98.4 °F (36.9 °C)-99.4 °F (37.4 °C)] 99.1 °F (37.3 °C)  Pulse:  [72-82] 72  Resp:  [15-20] 16  SpO2:  [94 %-97 %] 95 %  BP: (107-119)/(63-74) 111/73     Weight: 132.9 kg (293 lb)  Body mass index is 48.76 kg/m².    Intake/Output Summary (Last 24 hours) at 1/4/2025 1733  Last data filed at 1/4/2025 1728  Gross per 24 hour   Intake 240 ml   Output --   Net 240 ml         Physical Exam      NAD, A/O 3   RRR  CTAB   Soft, nondistended, bowel sounds present, mild diffuse tenderness without rebound or guarding   No edema     Significant Labs: All pertinent labs within the past 24 hours have been reviewed.  CBC:   Recent Labs   Lab 01/03/25  0835 01/04/25  0701   WBC 8.94 10.42   HGB 9.9* 10.3*   HCT 33.6* 33.2*    278     CMP:   Recent Labs   Lab 01/03/25  0835 01/04/25  0701    137   K 3.7 3.9    102   CO2 28 27   GLU 85 105   BUN 7 7   CREATININE 1.1 1.1   CALCIUM 8.9 8.8   PROT 6.4 6.2   ALBUMIN 3.4* 3.4*   BILITOT 0.4 0.3   ALKPHOS 66 60   AST 8* 7*   ALT 6* 6*   ANIONGAP 5* 8     Magnesium:   Recent Labs   Lab 01/03/25  0835 01/04/25  0701   MG 1.6 1.5*       Significant Imaging: I have reviewed all pertinent imaging results/findings within the past 24 hours.  Imaging Results              X-Ray Chest AP Portable (Final result)  Result time 12/31/24 18:42:01      Final result by Mirta Gayle MD (12/31/24 18:42:01)                   Impression:      No acute findings.      Electronically signed by: Mirta  Irwin  Date:    12/31/2024  Time:    18:42               Narrative:    EXAMINATION:  XR CHEST AP PORTABLE    CLINICAL HISTORY:  leukocytosis;    TECHNIQUE:  Single frontal view of the chest was performed.    COMPARISON:  11/27/2024    FINDINGS:  Lungs are clear. No focal consolidation. No pleural effusion. No pneumothorax. Normal heart size.                                       CT Abdomen Pelvis With IV Contrast NO Oral Contrast (Final result)  Result time 12/31/24 15:14:55      Final result by Samuel Boyle MD (12/31/24 15:14:55)                   Impression:      1. CT findings compatible with acute colitis as above, slightly worsened when compared to December 22, 2024.  No evidence of perforation or pneumatosis.  2. Additional stable findings as above.      Electronically signed by: Samuel Boyle  Date:    12/31/2024  Time:    15:14               Narrative:    EXAMINATION:  CT ABDOMEN PELVIS WITH IV CONTRAST    CLINICAL HISTORY:  Abdominal pain, acute, nonlocalized;.    TECHNIQUE:  Post infusion axial images were obtained from the lung bases to the pubic symphysis 100 cc nonionic contrast was utilized for the examination.    COMPARISON:  December 22, 2024    FINDINGS:  The lung bases are unremarkable.    No hepatic mass or contour abnormality is identified.  The gallbladder is absent.  The biliary tree is nondilated.  The spleen, pancreas, and adrenal glands are normal.  Bilateral nonobstructing renal calculi are similar to the prior study, the largest of which is at the lower pole on the left measuring 7 mm.  There are no ureteral calculi and there is no hydronephrosis.  The abdominal aorta is normal in caliber.    Assessment of bowel structures reveals persistent mild abnormal circumferential wall thickening of the colon from the proximal transverse colon to the rectum.  Compared to the prior study, the degree of wall thickening has increased slightly, with slightly increasing pericolonic edema,  most notably at the level of the sigmoid colon.  Findings are compatible with acute colitis.  There is no evidence of perforation, pneumatosis, or obstruction.  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    Images of the pelvis demonstrate a normal urinary bladder.  There is no drainable free fluid or lymphadenopathy.    No acute osseous abnormalities are identified.

## 2025-01-05 LAB
ALBUMIN SERPL BCP-MCNC: 3.4 G/DL (ref 3.5–5.2)
ALP SERPL-CCNC: 59 U/L (ref 55–135)
ALT SERPL W/O P-5'-P-CCNC: 6 U/L (ref 10–44)
ANION GAP SERPL CALC-SCNC: 7 MMOL/L (ref 8–16)
AST SERPL-CCNC: 9 U/L (ref 10–40)
BASOPHILS # BLD AUTO: 0.04 K/UL (ref 0–0.2)
BASOPHILS NFR BLD: 0.5 % (ref 0–1.9)
BILIRUB SERPL-MCNC: 0.4 MG/DL (ref 0.1–1)
BUN SERPL-MCNC: 8 MG/DL (ref 6–20)
CALCIUM SERPL-MCNC: 8.9 MG/DL (ref 8.7–10.5)
CHLORIDE SERPL-SCNC: 102 MMOL/L (ref 95–110)
CO2 SERPL-SCNC: 29 MMOL/L (ref 23–29)
CREAT SERPL-MCNC: 1.2 MG/DL (ref 0.5–1.4)
DIFFERENTIAL METHOD BLD: ABNORMAL
EOSINOPHIL # BLD AUTO: 0.5 K/UL (ref 0–0.5)
EOSINOPHIL NFR BLD: 5.9 % (ref 0–8)
ERYTHROCYTE [DISTWIDTH] IN BLOOD BY AUTOMATED COUNT: 17.2 % (ref 11.5–14.5)
EST. GFR  (NO RACE VARIABLE): >60 ML/MIN/1.73 M^2
GLUCOSE SERPL-MCNC: 94 MG/DL (ref 70–110)
HCT VFR BLD AUTO: 32.6 % (ref 40–54)
HGB BLD-MCNC: 10.1 G/DL (ref 14–18)
IMM GRANULOCYTES # BLD AUTO: 0.05 K/UL (ref 0–0.04)
IMM GRANULOCYTES NFR BLD AUTO: 0.6 % (ref 0–0.5)
LYMPHOCYTES # BLD AUTO: 1.2 K/UL (ref 1–4.8)
LYMPHOCYTES NFR BLD: 13.8 % (ref 18–48)
MAGNESIUM SERPL-MCNC: 1.7 MG/DL (ref 1.6–2.6)
MCH RBC QN AUTO: 27 PG (ref 27–31)
MCHC RBC AUTO-ENTMCNC: 31 G/DL (ref 32–36)
MCV RBC AUTO: 87 FL (ref 82–98)
MONOCYTES # BLD AUTO: 1.3 K/UL (ref 0.3–1)
MONOCYTES NFR BLD: 14.9 % (ref 4–15)
NEUTROPHILS # BLD AUTO: 5.7 K/UL (ref 1.8–7.7)
NEUTROPHILS NFR BLD: 64.3 % (ref 38–73)
NRBC BLD-RTO: 0 /100 WBC
PLATELET # BLD AUTO: 273 K/UL (ref 150–450)
PMV BLD AUTO: 9.6 FL (ref 9.2–12.9)
POTASSIUM SERPL-SCNC: 4.2 MMOL/L (ref 3.5–5.1)
PROT SERPL-MCNC: 6.4 G/DL (ref 6–8.4)
RBC # BLD AUTO: 3.74 M/UL (ref 4.6–6.2)
SODIUM SERPL-SCNC: 138 MMOL/L (ref 136–145)
WBC # BLD AUTO: 8.81 K/UL (ref 3.9–12.7)

## 2025-01-05 PROCEDURE — 25000003 PHARM REV CODE 250: Performed by: HOSPITALIST

## 2025-01-05 PROCEDURE — 83735 ASSAY OF MAGNESIUM: CPT | Performed by: HOSPITALIST

## 2025-01-05 PROCEDURE — 12000002 HC ACUTE/MED SURGE SEMI-PRIVATE ROOM

## 2025-01-05 PROCEDURE — 25000003 PHARM REV CODE 250: Performed by: STUDENT IN AN ORGANIZED HEALTH CARE EDUCATION/TRAINING PROGRAM

## 2025-01-05 PROCEDURE — 25000003 PHARM REV CODE 250

## 2025-01-05 PROCEDURE — 36415 COLL VENOUS BLD VENIPUNCTURE: CPT | Performed by: HOSPITALIST

## 2025-01-05 PROCEDURE — 25000003 PHARM REV CODE 250: Performed by: INTERNAL MEDICINE

## 2025-01-05 PROCEDURE — 85025 COMPLETE CBC W/AUTO DIFF WBC: CPT | Performed by: HOSPITALIST

## 2025-01-05 PROCEDURE — 27000207 HC ISOLATION

## 2025-01-05 PROCEDURE — 63600175 PHARM REV CODE 636 W HCPCS: Performed by: INTERNAL MEDICINE

## 2025-01-05 PROCEDURE — 80053 COMPREHEN METABOLIC PANEL: CPT | Performed by: HOSPITALIST

## 2025-01-05 RX ORDER — HYDROCORTISONE 1 %
CREAM (GRAM) TOPICAL 2 TIMES DAILY
Status: DISPENSED | OUTPATIENT
Start: 2025-01-05 | End: 2025-01-08

## 2025-01-05 RX ADMIN — BUSPIRONE HYDROCHLORIDE 15 MG: 5 TABLET ORAL at 09:01

## 2025-01-05 RX ADMIN — FAMOTIDINE 20 MG: 20 TABLET ORAL at 09:01

## 2025-01-05 RX ADMIN — HYDROCODONE BITARTRATE AND ACETAMINOPHEN 1 TABLET: 5; 325 TABLET ORAL at 10:01

## 2025-01-05 RX ADMIN — ONDANSETRON 4 MG: 2 INJECTION INTRAMUSCULAR; INTRAVENOUS at 02:01

## 2025-01-05 RX ADMIN — FIDAXOMICIN 200 MG: 200 TABLET, FILM COATED ORAL at 09:01

## 2025-01-05 RX ADMIN — HYDROCORTISONE: 1 CREAM TOPICAL at 01:01

## 2025-01-05 RX ADMIN — MORPHINE SULFATE 2 MG: 2 INJECTION, SOLUTION INTRAMUSCULAR; INTRAVENOUS at 06:01

## 2025-01-05 RX ADMIN — MORPHINE SULFATE 2 MG: 2 INJECTION, SOLUTION INTRAMUSCULAR; INTRAVENOUS at 07:01

## 2025-01-05 RX ADMIN — ESCITALOPRAM OXALATE 20 MG: 10 TABLET ORAL at 09:01

## 2025-01-05 RX ADMIN — ATORVASTATIN CALCIUM 40 MG: 40 TABLET, FILM COATED ORAL at 08:01

## 2025-01-05 RX ADMIN — HYDROCODONE BITARTRATE AND ACETAMINOPHEN 1 TABLET: 5; 325 TABLET ORAL at 02:01

## 2025-01-05 RX ADMIN — HYDROCORTISONE: 1 CREAM TOPICAL at 08:01

## 2025-01-05 RX ADMIN — METOCLOPRAMIDE 5 MG: 5 TABLET ORAL at 06:01

## 2025-01-05 RX ADMIN — HYOSCYAMINE SULFATE 0.25 MG: 0.12 TABLET SUBLINGUAL at 02:01

## 2025-01-05 RX ADMIN — RIVAROXABAN 20 MG: 10 TABLET, FILM COATED ORAL at 04:01

## 2025-01-05 RX ADMIN — HYDROCODONE BITARTRATE AND ACETAMINOPHEN 1 TABLET: 5; 325 TABLET ORAL at 09:01

## 2025-01-05 RX ADMIN — HYOSCYAMINE SULFATE 0.25 MG: 0.12 TABLET SUBLINGUAL at 12:01

## 2025-01-05 RX ADMIN — BUSPIRONE HYDROCHLORIDE 15 MG: 5 TABLET ORAL at 02:01

## 2025-01-05 RX ADMIN — DIPHENHYDRAMINE HYDROCHLORIDE 25 MG: 25 CAPSULE ORAL at 09:01

## 2025-01-05 RX ADMIN — HYOSCYAMINE SULFATE 0.25 MG: 0.12 TABLET SUBLINGUAL at 09:01

## 2025-01-05 RX ADMIN — FIDAXOMICIN 200 MG: 200 TABLET, FILM COATED ORAL at 10:01

## 2025-01-05 RX ADMIN — ZOLPIDEM TARTRATE 10 MG: 5 TABLET, COATED ORAL at 08:01

## 2025-01-05 RX ADMIN — MORPHINE SULFATE 2 MG: 2 INJECTION, SOLUTION INTRAMUSCULAR; INTRAVENOUS at 12:01

## 2025-01-05 RX ADMIN — METOPROLOL SUCCINATE 50 MG: 50 TABLET, FILM COATED, EXTENDED RELEASE ORAL at 09:01

## 2025-01-05 RX ADMIN — Medication 6 MG: at 09:01

## 2025-01-05 NOTE — PROGRESS NOTES
[facility] Medicine  Progress Note    Patient Name: Jacoby Jean-Baptiste  MRN: [id number]  Patient Class: IP- Inpatient   Admission Date: 12/31/2024  Length of Stay: 3 days  Attending Physician: Adrian Estrada DO  Primary Care Provider: Hunter Aguilar III, MD        Subjective     Principal Problem:Colitis        HPI:  48 yo male with PMH of ulcerative colitis presented because of worsening abdominal pain and diarrhea.  According to the patient, he has a h/o on and off diarrhea and abdominal cramp related to his ulcerative colitis.  He was recently admitted on 12/22 because of abdominal pain/nausea/vomiting/hematochezia.  At that time, his CT showed pan colitis, was seen by GI who suggested that his symptoms are likely not secondary to ulcerative colitis flare.  After his discharge, he started to have perfused diarrhea, and abdominal cramps/pain.  He is having anywhere between 10-15 bowel movements a day, some of them were hematochezia.  His abdominal pain is described as crampy, severe, 10/10 on pain scale.  He has been having chills with no fever. As a result, he decided to come back to the ER for evaluation.    In the ER, vitals showed blood pressure of 126/83, heart rate of 84, respiratory rate of 18, afebrile satting 99% on room air.  CBC with white count of 13.5, and hemoglobin of 10.8.  CBC with sodium of 134.  CRP is mildly elevated at 2.8. CT abdomen/pelvis showed findings compatible with acute colitis slightly worsened when compared to December 22, 2024. No evidence of perforation or pneumatosis.  He was given Reglan, Benadryl and Zofran.  He will be admitted to Medicine for further management.      Overview/Hospital Course:      49-year-old gentleman with history of ulcerative colitis and c diff who presented with high frequency diarrhea described as clear in color without blood.  He has been seen by GI and currently it is felt that this represents recurrent C diff. He has been  placed on Dificid beginning the evening of 1/2. He can be discharged on 1/4 if diarrhea continues to improve.  Outpatient GI office will continue to arrange his getting zinplava after discharge prior to end of dificid therapy.      While here, his C diff antigen was positive whereas it was previously negative during prior hospitalizations.  Toxin EIA was negative and PCR pending.     Regarding his ulcerative colitis, he has now received 2 doses of Tremfya as an outpatient.  Gastroenterology is not recommending changes to his ulcerative colitis regimen.      Wound care consulted for chronic wound.       Interval History:  Patient was seen and examined at bedside this morning.  He reports that he is not doing much better today.  He continues to have diarrhea, but states that it is somewhat less than yesterday.  Continuing fidaxomicin.  C diff labs have been reordered, as they were apparently canceled by outside lab secondary to being collected in the wrong tube.    Review of Systems  Objective:     Vital Signs (Most Recent):  Temp: 98.1 °F (36.7 °C) (01/05/25 1130)  Pulse: 74 (01/05/25 1130)  Resp: 15 (01/05/25 1456)  BP: 110/69 (01/05/25 1130)  SpO2: 98 % (01/05/25 1130) Vital Signs (24h Range):  Temp:  [98.1 °F (36.7 °C)-98.8 °F (37.1 °C)] 98.1 °F (36.7 °C)  Pulse:  [72-80] 74  Resp:  [14-17] 15  SpO2:  [92 %-98 %] 98 %  BP: (110-146)/(59-69) 110/69     Weight: 132.9 kg (293 lb)  Body mass index is 48.76 kg/m².    Intake/Output Summary (Last 24 hours) at 1/5/2025 1559  Last data filed at 1/5/2025 1253  Gross per 24 hour   Intake 800 ml   Output --   Net 800 ml         Physical Exam      NAD, A/O 3   RRR  CTAB   Soft, nondistended, bowel sounds present, mild diffuse tenderness without rebound or guarding   No edema     Significant Labs: All pertinent labs within the past 24 hours have been reviewed.  CBC:   Recent Labs   Lab 01/04/25  0701 01/05/25  0551   WBC 10.42 8.81   HGB 10.3* 10.1*   HCT 33.2* 32.6*     273     CMP:   Recent Labs   Lab 01/04/25  0701 01/05/25  0551    138   K 3.9 4.2    102   CO2 27 29    94   BUN 7 8   CREATININE 1.1 1.2   CALCIUM 8.8 8.9   PROT 6.2 6.4   ALBUMIN 3.4* 3.4*   BILITOT 0.3 0.4   ALKPHOS 60 59   AST 7* 9*   ALT 6* 6*   ANIONGAP 8 7*     Magnesium:   Recent Labs   Lab 01/04/25  0701 01/05/25  0551   MG 1.5* 1.7       Significant Imaging: I have reviewed all pertinent imaging results/findings within the past 24 hours.  Imaging Results              X-Ray Chest AP Portable (Final result)  Result time 12/31/24 18:42:01      Final result by Mirta Gayle MD (12/31/24 18:42:01)                   Impression:      No acute findings.      Electronically signed by: Mirta Gayle  Date:    12/31/2024  Time:    18:42               Narrative:    EXAMINATION:  XR CHEST AP PORTABLE    CLINICAL HISTORY:  leukocytosis;    TECHNIQUE:  Single frontal view of the chest was performed.    COMPARISON:  11/27/2024    FINDINGS:  Lungs are clear. No focal consolidation. No pleural effusion. No pneumothorax. Normal heart size.                                       CT Abdomen Pelvis With IV Contrast NO Oral Contrast (Final result)  Result time 12/31/24 15:14:55      Final result by Samuel Boyle MD (12/31/24 15:14:55)                   Impression:      1. CT findings compatible with acute colitis as above, slightly worsened when compared to December 22, 2024.  No evidence of perforation or pneumatosis.  2. Additional stable findings as above.      Electronically signed by: Samuel Boyle  Date:    12/31/2024  Time:    15:14               Narrative:    EXAMINATION:  CT ABDOMEN PELVIS WITH IV CONTRAST    CLINICAL HISTORY:  Abdominal pain, acute, nonlocalized;.    TECHNIQUE:  Post infusion axial images were obtained from the lung bases to the pubic symphysis 100 cc nonionic contrast was utilized for the examination.    COMPARISON:  December 22, 2024    FINDINGS:  The lung  bases are unremarkable.    No hepatic mass or contour abnormality is identified.  The gallbladder is absent.  The biliary tree is nondilated.  The spleen, pancreas, and adrenal glands are normal.  Bilateral nonobstructing renal calculi are similar to the prior study, the largest of which is at the lower pole on the left measuring 7 mm.  There are no ureteral calculi and there is no hydronephrosis.  The abdominal aorta is normal in caliber.    Assessment of bowel structures reveals persistent mild abnormal circumferential wall thickening of the colon from the proximal transverse colon to the rectum.  Compared to the prior study, the degree of wall thickening has increased slightly, with slightly increasing pericolonic edema, most notably at the level of the sigmoid colon.  Findings are compatible with acute colitis.  There is no evidence of perforation, pneumatosis, or obstruction.  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    Images of the pelvis demonstrate a normal urinary bladder.  There is no drainable free fluid or lymphadenopathy.    No acute osseous abnormalities are identified.                                        Assessment and Plan     * Colitis  -likely secondary to C diff. Dificid started by GI.  Follow.  -outpatient GI office will set up outpatient infusion of zinplava which is to occur prior to the end of therapy with dificid  Reordered C diff labs, pending   Slowly improving  Continue fidaxomicin    Leukocytosis  Secondary to colitis.  see colitis further plan of care.        Gastroparesis  Continue symptom support    DVT (deep venous thrombosis)  Resume Xarelto.      Hemoglobin stable.  No evidence of active bleeding.  The patient is not having hematochezia.    Open wound of left upper arm  Wound care consulted      Ulcerative colitis    The patient is on outpatient tremfya. Continue. GI consulted     BMI 45.0-49.9, adult  Body mass index is 48.76 kg/m². Morbid obesity  complicates all aspects of disease management from diagnostic modalities to treatment. Weight loss encouraged and health benefits explained to patient.         History of supraventricular tachycardia  continue metoprolol.        VTE Risk Mitigation (From admission, onward)           Ordered     rivaroxaban tablet 20 mg  With dinner         01/03/25 1445     Reason for No Pharmacological VTE Prophylaxis  Once        Question:  Reasons:  Answer:  Active Bleeding    12/31/24 1801     IP VTE HIGH RISK PATIENT  Once         12/31/24 1801     Place sequential compression device  Until discontinued         12/31/24 1801                    Discharge Planning   GERARDO: 1/8/2025     Code Status: Full Code   Medical Readiness for Discharge Date:   Discharge Plan A: Home                        Adrian Estrada DO  Department of Hospital Medicine   [facility]

## 2025-01-05 NOTE — PLAN OF CARE
Problem: Adult Inpatient Plan of Care  Goal: Plan of Care Review  Outcome: Progressing  Goal: Patient-Specific Goal (Individualized)  Outcome: Progressing  Goal: Absence of Hospital-Acquired Illness or Injury  Outcome: Progressing  Goal: Optimal Comfort and Wellbeing  Outcome: Progressing  Goal: Readiness for Transition of Care  Outcome: Progressing     Problem: Bariatric Environmental Safety  Goal: Safety Maintained with Care  Outcome: Progressing     Problem: Diabetes Comorbidity  Goal: Blood Glucose Level Within Targeted Range  Outcome: Progressing     Problem: Wound  Goal: Optimal Coping  Outcome: Progressing  Goal: Optimal Functional Ability  Outcome: Progressing  Goal: Absence of Infection Signs and Symptoms  Outcome: Progressing  Goal: Improved Oral Intake  Outcome: Progressing  Goal: Optimal Pain Control and Function  Outcome: Progressing  Goal: Skin Health and Integrity  Outcome: Progressing  Goal: Optimal Wound Healing  Outcome: Progressing     Problem: Infection  Goal: Absence of Infection Signs and Symptoms  Outcome: Progressing      Naz Dash (: 1970), was seen at bedside for diabetic foot infection of the left foot. Pt is s/p Lisfranc amputation of the left foot, DOS: 2018. Pt states he is doing much better today compared to last night. Pt states he still has aching pain to the left foot, but it is well controlled with pain medication as needed. Pt states he has regain his appetite and ate breakfast. Pt states he is staying of the left foot and elevating the foot with a pillow as advised. Pt states he has kept the dressings on clean, dry and intact. Pt denies any F/N/V/C and SOB.      Objective:  Patient is alert and oriented x 3, and is in no acute distress.     Vascular: DP pulse palpable bilateral. PT pulse weakly palpable on right foot, no palpable on left foot. CFT less than 5 seconds to right toes 1-5. Sparse pedal hair noted bilateral.    Derm:  Skin is warm to warm from proximal leg to distal foot bilateral, with normal texture. Nails 1-5 on right foot are thickened, yellow and dystrophic. Neuro:  Protective sensation absent to all toes right foot via a 5.07 [place] monofilament.    Musculoskeletal: Muscle strength 5/5 with PF/DF/I and E of both feet. Full and stable ROM of the right 1st MTPJ without pain or crepitus.      Surgical wound left foot:  Skin edges are well approximated. Skin sutures are all intact. No erythema, no drainage, no macerated skin noted. Mild edema noted to left foot consistent with post operative course. Fred-Banegas drain is in good position and functional - less than 5 mL of bloody drainage noted in bulb at dressing change.      Radiographs (2018): 3 views of left foot.   FINDINGS:   There has been interval amputation at the level of 1st and 3rd tarsal   metatarsal joint spaces and at the level the proximal 2nd, 4th and 5th   metatarsals.  Drainage catheter seen at the operative site.  Few antibiotic   beads are also seen in this region.  Vascular calcifications are seen within   the soft tissues. MRI Left Foot (11/01/2018): Impression   1. Marrow edema now noted throughout the 2nd metatarsal without corresponding   T1 signal abnormality.  Findings may reflect reactive noninfectious osteitis   versus early changes of osteomyelitis. 2. Very minimal marrow edema at the surgical margin of the 1st metatarsal   with associated decreased T1 signal.  Findings suspicious for early changes   of osteomyelitis as no significant edema was noted on prior exam from October 23, 2018. 3. Patchy marrow edema of the 3rd metatarsal head and neck as well as   proximal and middle phalanges of the 3rd toe without corresponding T1 signal   abnormality which may reflect reactive noninfectious osteitis versus very   early changes of osteomyelitis. 4. Diffuse soft tissue edema similar to previous exam.  Correlate clinically   for cellulitis. 5. Small amount of fluid tracking along the 3rd extensor tendon sheath   suspicious for mild tenosynovitis.        Assessment:  - Cellulitis left foot with gangrene; s/p Lisfranc amputation of left foot, DOS: 11/02/2018  - PAD  - DM with peripheral neuropathy     Plan:  - Discussed treatment plan with the patient. Surgical site is stable. Patient's pain is improving and well controlled with current medications. Plan to remove Fred-Banegas drain on 11/04/2018. Patient will not need a wound VAC. All of the patient's questions were answered to his satisfaction.   - Performed dressing change on left foot:  Applied betadine-soaked Adaptic to surgical site followed by gauze dressing, ABD pads x 2, Kerlix roll, and an ACE bandage up to the ankle for mild compression. Patient is to be non-weight bearing on left foot and is to elevate the foot. Standing nursing orders for dressing changes and drain maintenance. - Placed consult for infectious disease. Patient is currently on IV Zosyn and Zyvox via PICC line. Thank you, Dr. Willoughby Leisure your recommendations.    -

## 2025-01-05 NOTE — SUBJECTIVE & OBJECTIVE
Interval History:  Patient was seen and examined at bedside this morning.  He reports that he is not doing much better today.  He continues to have diarrhea, but states that it is somewhat less than yesterday.  Continuing fidaxomicin.  C diff labs have been reordered, as they were apparently canceled by outside lab secondary to being collected in the wrong tube.    Review of Systems  Objective:     Vital Signs (Most Recent):  Temp: 98.1 °F (36.7 °C) (01/05/25 1130)  Pulse: 74 (01/05/25 1130)  Resp: 15 (01/05/25 1456)  BP: 110/69 (01/05/25 1130)  SpO2: 98 % (01/05/25 1130) Vital Signs (24h Range):  Temp:  [98.1 °F (36.7 °C)-98.8 °F (37.1 °C)] 98.1 °F (36.7 °C)  Pulse:  [72-80] 74  Resp:  [14-17] 15  SpO2:  [92 %-98 %] 98 %  BP: (110-146)/(59-69) 110/69     Weight: 132.9 kg (293 lb)  Body mass index is 48.76 kg/m².    Intake/Output Summary (Last 24 hours) at 1/5/2025 1559  Last data filed at 1/5/2025 1253  Gross per 24 hour   Intake 800 ml   Output --   Net 800 ml         Physical Exam      NAD, A/O 3   RRR  CTAB   Soft, nondistended, bowel sounds present, mild diffuse tenderness without rebound or guarding   No edema     Significant Labs: All pertinent labs within the past 24 hours have been reviewed.  CBC:   Recent Labs   Lab 01/04/25  0701 01/05/25 0551   WBC 10.42 8.81   HGB 10.3* 10.1*   HCT 33.2* 32.6*    273     CMP:   Recent Labs   Lab 01/04/25  0701 01/05/25 0551    138   K 3.9 4.2    102   CO2 27 29    94   BUN 7 8   CREATININE 1.1 1.2   CALCIUM 8.8 8.9   PROT 6.2 6.4   ALBUMIN 3.4* 3.4*   BILITOT 0.3 0.4   ALKPHOS 60 59   AST 7* 9*   ALT 6* 6*   ANIONGAP 8 7*     Magnesium:   Recent Labs   Lab 01/04/25  0701 01/05/25  0551   MG 1.5* 1.7       Significant Imaging: I have reviewed all pertinent imaging results/findings within the past 24 hours.  Imaging Results              X-Ray Chest AP Portable (Final result)  Result time 12/31/24 18:42:01      Final result by Irwin  MD Mirta (12/31/24 18:42:01)                   Impression:      No acute findings.      Electronically signed by: Mirta Gayle  Date:    12/31/2024  Time:    18:42               Narrative:    EXAMINATION:  XR CHEST AP PORTABLE    CLINICAL HISTORY:  leukocytosis;    TECHNIQUE:  Single frontal view of the chest was performed.    COMPARISON:  11/27/2024    FINDINGS:  Lungs are clear. No focal consolidation. No pleural effusion. No pneumothorax. Normal heart size.                                       CT Abdomen Pelvis With IV Contrast NO Oral Contrast (Final result)  Result time 12/31/24 15:14:55      Final result by Samuel Boyle MD (12/31/24 15:14:55)                   Impression:      1. CT findings compatible with acute colitis as above, slightly worsened when compared to December 22, 2024.  No evidence of perforation or pneumatosis.  2. Additional stable findings as above.      Electronically signed by: Samuel Boyle  Date:    12/31/2024  Time:    15:14               Narrative:    EXAMINATION:  CT ABDOMEN PELVIS WITH IV CONTRAST    CLINICAL HISTORY:  Abdominal pain, acute, nonlocalized;.    TECHNIQUE:  Post infusion axial images were obtained from the lung bases to the pubic symphysis 100 cc nonionic contrast was utilized for the examination.    COMPARISON:  December 22, 2024    FINDINGS:  The lung bases are unremarkable.    No hepatic mass or contour abnormality is identified.  The gallbladder is absent.  The biliary tree is nondilated.  The spleen, pancreas, and adrenal glands are normal.  Bilateral nonobstructing renal calculi are similar to the prior study, the largest of which is at the lower pole on the left measuring 7 mm.  There are no ureteral calculi and there is no hydronephrosis.  The abdominal aorta is normal in caliber.    Assessment of bowel structures reveals persistent mild abnormal circumferential wall thickening of the colon from the proximal transverse colon to the  rectum.  Compared to the prior study, the degree of wall thickening has increased slightly, with slightly increasing pericolonic edema, most notably at the level of the sigmoid colon.  Findings are compatible with acute colitis.  There is no evidence of perforation, pneumatosis, or obstruction.  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    Images of the pelvis demonstrate a normal urinary bladder.  There is no drainable free fluid or lymphadenopathy.    No acute osseous abnormalities are identified.

## 2025-01-05 NOTE — ASSESSMENT & PLAN NOTE
-likely secondary to C diff. Dificid started by GI.  Follow.  -outpatient GI office will set up outpatient infusion of zinplava which is to occur prior to the end of therapy with dificid  Reordered C diff labs, pending   Slowly improving  Continue fidaxomicin

## 2025-01-06 ENCOUNTER — TELEPHONE (OUTPATIENT)
Dept: FAMILY MEDICINE | Facility: CLINIC | Age: 50
End: 2025-01-06
Payer: COMMERCIAL

## 2025-01-06 LAB
ALBUMIN SERPL BCP-MCNC: 3.2 G/DL (ref 3.5–5.2)
ALP SERPL-CCNC: 53 U/L (ref 55–135)
ALT SERPL W/O P-5'-P-CCNC: 6 U/L (ref 10–44)
ANION GAP SERPL CALC-SCNC: 5 MMOL/L (ref 8–16)
AST SERPL-CCNC: 8 U/L (ref 10–40)
BASOPHILS # BLD AUTO: 0.04 K/UL (ref 0–0.2)
BASOPHILS NFR BLD: 0.4 % (ref 0–1.9)
BILIRUB SERPL-MCNC: 0.3 MG/DL (ref 0.1–1)
BUN SERPL-MCNC: 11 MG/DL (ref 6–20)
C DIFF GDH STL QL: POSITIVE
C DIFF TOX A+B STL QL IA: NEGATIVE
C DIFF TOX GENS STL QL NAA+PROBE: POSITIVE
CALCIUM SERPL-MCNC: 8.6 MG/DL (ref 8.7–10.5)
CHLORIDE SERPL-SCNC: 103 MMOL/L (ref 95–110)
CO2 SERPL-SCNC: 27 MMOL/L (ref 23–29)
CREAT SERPL-MCNC: 1.2 MG/DL (ref 0.5–1.4)
DIFFERENTIAL METHOD BLD: ABNORMAL
EOSINOPHIL # BLD AUTO: 0.7 K/UL (ref 0–0.5)
EOSINOPHIL NFR BLD: 7.6 % (ref 0–8)
ERYTHROCYTE [DISTWIDTH] IN BLOOD BY AUTOMATED COUNT: 16.9 % (ref 11.5–14.5)
EST. GFR  (NO RACE VARIABLE): >60 ML/MIN/1.73 M^2
GLUCOSE SERPL-MCNC: 96 MG/DL (ref 70–110)
HCT VFR BLD AUTO: 31 % (ref 40–54)
HGB BLD-MCNC: 9.4 G/DL (ref 14–18)
IMM GRANULOCYTES # BLD AUTO: 0.05 K/UL (ref 0–0.04)
IMM GRANULOCYTES NFR BLD AUTO: 0.6 % (ref 0–0.5)
LYMPHOCYTES # BLD AUTO: 1.3 K/UL (ref 1–4.8)
LYMPHOCYTES NFR BLD: 14.1 % (ref 18–48)
MAGNESIUM SERPL-MCNC: 1.5 MG/DL (ref 1.6–2.6)
MCH RBC QN AUTO: 26.3 PG (ref 27–31)
MCHC RBC AUTO-ENTMCNC: 30.3 G/DL (ref 32–36)
MCV RBC AUTO: 87 FL (ref 82–98)
MONOCYTES # BLD AUTO: 1.2 K/UL (ref 0.3–1)
MONOCYTES NFR BLD: 12.9 % (ref 4–15)
NEUTROPHILS # BLD AUTO: 5.7 K/UL (ref 1.8–7.7)
NEUTROPHILS NFR BLD: 64.4 % (ref 38–73)
NRBC BLD-RTO: 0 /100 WBC
PLATELET # BLD AUTO: 257 K/UL (ref 150–450)
PMV BLD AUTO: 9.9 FL (ref 9.2–12.9)
POTASSIUM SERPL-SCNC: 3.7 MMOL/L (ref 3.5–5.1)
PROT SERPL-MCNC: 6 G/DL (ref 6–8.4)
RBC # BLD AUTO: 3.58 M/UL (ref 4.6–6.2)
SODIUM SERPL-SCNC: 135 MMOL/L (ref 136–145)
WBC # BLD AUTO: 8.89 K/UL (ref 3.9–12.7)

## 2025-01-06 PROCEDURE — 63600175 PHARM REV CODE 636 W HCPCS: Performed by: INTERNAL MEDICINE

## 2025-01-06 PROCEDURE — 83735 ASSAY OF MAGNESIUM: CPT | Performed by: HOSPITALIST

## 2025-01-06 PROCEDURE — 27000207 HC ISOLATION

## 2025-01-06 PROCEDURE — 25000003 PHARM REV CODE 250: Performed by: INTERNAL MEDICINE

## 2025-01-06 PROCEDURE — 85025 COMPLETE CBC W/AUTO DIFF WBC: CPT | Performed by: HOSPITALIST

## 2025-01-06 PROCEDURE — 12000002 HC ACUTE/MED SURGE SEMI-PRIVATE ROOM

## 2025-01-06 PROCEDURE — 25000003 PHARM REV CODE 250: Performed by: HOSPITALIST

## 2025-01-06 PROCEDURE — 25000003 PHARM REV CODE 250

## 2025-01-06 PROCEDURE — 36415 COLL VENOUS BLD VENIPUNCTURE: CPT | Performed by: HOSPITALIST

## 2025-01-06 PROCEDURE — 80053 COMPREHEN METABOLIC PANEL: CPT | Performed by: HOSPITALIST

## 2025-01-06 RX ADMIN — ESCITALOPRAM OXALATE 20 MG: 10 TABLET ORAL at 08:01

## 2025-01-06 RX ADMIN — MORPHINE SULFATE 2 MG: 2 INJECTION, SOLUTION INTRAMUSCULAR; INTRAVENOUS at 02:01

## 2025-01-06 RX ADMIN — HYDROCODONE BITARTRATE AND ACETAMINOPHEN 1 TABLET: 5; 325 TABLET ORAL at 12:01

## 2025-01-06 RX ADMIN — BUSPIRONE HYDROCHLORIDE 15 MG: 5 TABLET ORAL at 08:01

## 2025-01-06 RX ADMIN — HYDROCODONE BITARTRATE AND ACETAMINOPHEN 1 TABLET: 5; 325 TABLET ORAL at 08:01

## 2025-01-06 RX ADMIN — HYOSCYAMINE SULFATE 0.25 MG: 0.12 TABLET SUBLINGUAL at 04:01

## 2025-01-06 RX ADMIN — METOCLOPRAMIDE 5 MG: 5 TABLET ORAL at 12:01

## 2025-01-06 RX ADMIN — MORPHINE SULFATE 2 MG: 2 INJECTION, SOLUTION INTRAMUSCULAR; INTRAVENOUS at 04:01

## 2025-01-06 RX ADMIN — RIVAROXABAN 20 MG: 10 TABLET, FILM COATED ORAL at 04:01

## 2025-01-06 RX ADMIN — ZOLPIDEM TARTRATE 10 MG: 5 TABLET, COATED ORAL at 08:01

## 2025-01-06 RX ADMIN — Medication 6 MG: at 08:01

## 2025-01-06 RX ADMIN — DIPHENHYDRAMINE HYDROCHLORIDE 25 MG: 25 CAPSULE ORAL at 04:01

## 2025-01-06 RX ADMIN — ONDANSETRON 4 MG: 2 INJECTION INTRAMUSCULAR; INTRAVENOUS at 09:01

## 2025-01-06 RX ADMIN — MORPHINE SULFATE 2 MG: 2 INJECTION, SOLUTION INTRAMUSCULAR; INTRAVENOUS at 09:01

## 2025-01-06 RX ADMIN — HYOSCYAMINE SULFATE 0.25 MG: 0.12 TABLET SUBLINGUAL at 12:01

## 2025-01-06 RX ADMIN — ONDANSETRON 4 MG: 2 INJECTION INTRAMUSCULAR; INTRAVENOUS at 08:01

## 2025-01-06 RX ADMIN — HYDROCODONE BITARTRATE AND ACETAMINOPHEN 1 TABLET: 5; 325 TABLET ORAL at 04:01

## 2025-01-06 RX ADMIN — FIDAXOMICIN 200 MG: 200 TABLET, FILM COATED ORAL at 08:01

## 2025-01-06 RX ADMIN — Medication 800 MG: at 05:01

## 2025-01-06 RX ADMIN — HYDROCORTISONE: 1 CREAM TOPICAL at 08:01

## 2025-01-06 RX ADMIN — MORPHINE SULFATE 2 MG: 2 INJECTION, SOLUTION INTRAMUSCULAR; INTRAVENOUS at 10:01

## 2025-01-06 RX ADMIN — ATORVASTATIN CALCIUM 40 MG: 40 TABLET, FILM COATED ORAL at 08:01

## 2025-01-06 RX ADMIN — HYOSCYAMINE SULFATE 0.25 MG: 0.12 TABLET SUBLINGUAL at 08:01

## 2025-01-06 RX ADMIN — HYDROCORTISONE: 1 CREAM TOPICAL at 09:01

## 2025-01-06 RX ADMIN — HYOSCYAMINE SULFATE 0.25 MG: 0.12 TABLET SUBLINGUAL at 11:01

## 2025-01-06 RX ADMIN — BUSPIRONE HYDROCHLORIDE 15 MG: 5 TABLET ORAL at 04:01

## 2025-01-06 RX ADMIN — METOPROLOL SUCCINATE 50 MG: 50 TABLET, FILM COATED, EXTENDED RELEASE ORAL at 08:01

## 2025-01-06 RX ADMIN — Medication 800 MG: at 12:01

## 2025-01-06 RX ADMIN — DIPHENHYDRAMINE HYDROCHLORIDE 25 MG: 25 CAPSULE ORAL at 08:01

## 2025-01-06 NOTE — PROGRESS NOTES
[facility] Medicine  Progress Note    Patient Name: Jacoby Jean-Baptiste  MRN: [id number]  Patient Class: IP- Inpatient   Admission Date: 12/31/2024  Length of Stay: 4 days  Attending Physician: Adrian Estrada DO  Primary Care Provider: Hunter Aguilar III, MD        Subjective     Principal Problem:Colitis        HPI:  48 yo male with PMH of ulcerative colitis presented because of worsening abdominal pain and diarrhea.  According to the patient, he has a h/o on and off diarrhea and abdominal cramp related to his ulcerative colitis.  He was recently admitted on 12/22 because of abdominal pain/nausea/vomiting/hematochezia.  At that time, his CT showed pan colitis, was seen by GI who suggested that his symptoms are likely not secondary to ulcerative colitis flare.  After his discharge, he started to have perfused diarrhea, and abdominal cramps/pain.  He is having anywhere between 10-15 bowel movements a day, some of them were hematochezia.  His abdominal pain is described as crampy, severe, 10/10 on pain scale.  He has been having chills with no fever. As a result, he decided to come back to the ER for evaluation.    In the ER, vitals showed blood pressure of 126/83, heart rate of 84, respiratory rate of 18, afebrile satting 99% on room air.  CBC with white count of 13.5, and hemoglobin of 10.8.  CBC with sodium of 134.  CRP is mildly elevated at 2.8. CT abdomen/pelvis showed findings compatible with acute colitis slightly worsened when compared to December 22, 2024. No evidence of perforation or pneumatosis.  He was given Reglan, Benadryl and Zofran.  He will be admitted to Medicine for further management.      Overview/Hospital Course:      49-year-old gentleman with history of ulcerative colitis and c diff who presented with high frequency diarrhea described as clear in color without blood.  He has been seen by GI and currently it is felt that this represents recurrent C diff. He has been  placed on Dificid beginning the evening of 1/2. He can be discharged on 1/4 if diarrhea continues to improve.  Outpatient GI office will continue to arrange his getting zinplava after discharge prior to end of dificid therapy.      While here, his C diff antigen was positive whereas it was previously negative during prior hospitalizations.  Toxin EIA was negative and PCR pending.     Regarding his ulcerative colitis, he has now received 2 doses of Tremfya as an outpatient.  Gastroenterology is not recommending changes to his ulcerative colitis regimen.      Wound care consulted for chronic wound.       Interval History:  Patient was seen and examined at bedside this morning.  He reports that he is not doing much better today.  He continues to have upwards of 10 episodes of diarrhea daily.  Remains on fidaxomicin.  Somehow C diff labs have resulted, antigen positive, toxin negative, PCR positive.    Review of Systems  Objective:     Vital Signs (Most Recent):  Temp: 98.2 °F (36.8 °C) (01/06/25 1115)  Pulse: 72 (01/06/25 1115)  Resp: 18 (01/06/25 1240)  BP: (!) 106/55 (01/06/25 1115)  SpO2: 96 % (01/06/25 1115) Vital Signs (24h Range):  Temp:  [98.2 °F (36.8 °C)-98.7 °F (37.1 °C)] 98.2 °F (36.8 °C)  Pulse:  [69-86] 72  Resp:  [15-18] 18  SpO2:  [93 %-96 %] 96 %  BP: (102-112)/(55-69) 106/55     Weight: 132.9 kg (293 lb)  Body mass index is 48.76 kg/m².    Intake/Output Summary (Last 24 hours) at 1/6/2025 1344  Last data filed at 1/6/2025 1236  Gross per 24 hour   Intake 960 ml   Output --   Net 960 ml         Physical Exam      NAD, A/O 3   RRR  CTAB   Soft, nondistended, bowel sounds present, mild diffuse tenderness without rebound or guarding   No edema     Significant Labs: All pertinent labs within the past 24 hours have been reviewed.  CBC:   Recent Labs   Lab 01/05/25  0551 01/06/25  0409   WBC 8.81 8.89   HGB 10.1* 9.4*   HCT 32.6* 31.0*    257     CMP:   Recent Labs   Lab 01/05/25  0551 01/06/25  0403     135*   K 4.2 3.7    103   CO2 29 27   GLU 94 96   BUN 8 11   CREATININE 1.2 1.2   CALCIUM 8.9 8.6*   PROT 6.4 6.0   ALBUMIN 3.4* 3.2*   BILITOT 0.4 0.3   ALKPHOS 59 53*   AST 9* 8*   ALT 6* 6*   ANIONGAP 7* 5*     Magnesium:   Recent Labs   Lab 01/05/25  0551 01/06/25  0409   MG 1.7 1.5*       Significant Imaging: I have reviewed all pertinent imaging results/findings within the past 24 hours.  Imaging Results              X-Ray Chest AP Portable (Final result)  Result time 12/31/24 18:42:01      Final result by Mirta Gayle MD (12/31/24 18:42:01)                   Impression:      No acute findings.      Electronically signed by: Mirta Gayle  Date:    12/31/2024  Time:    18:42               Narrative:    EXAMINATION:  XR CHEST AP PORTABLE    CLINICAL HISTORY:  leukocytosis;    TECHNIQUE:  Single frontal view of the chest was performed.    COMPARISON:  11/27/2024    FINDINGS:  Lungs are clear. No focal consolidation. No pleural effusion. No pneumothorax. Normal heart size.                                       CT Abdomen Pelvis With IV Contrast NO Oral Contrast (Final result)  Result time 12/31/24 15:14:55      Final result by Samuel Boyle MD (12/31/24 15:14:55)                   Impression:      1. CT findings compatible with acute colitis as above, slightly worsened when compared to December 22, 2024.  No evidence of perforation or pneumatosis.  2. Additional stable findings as above.      Electronically signed by: Samuel Boyle  Date:    12/31/2024  Time:    15:14               Narrative:    EXAMINATION:  CT ABDOMEN PELVIS WITH IV CONTRAST    CLINICAL HISTORY:  Abdominal pain, acute, nonlocalized;.    TECHNIQUE:  Post infusion axial images were obtained from the lung bases to the pubic symphysis 100 cc nonionic contrast was utilized for the examination.    COMPARISON:  December 22, 2024    FINDINGS:  The lung bases are unremarkable.    No hepatic mass or contour  abnormality is identified.  The gallbladder is absent.  The biliary tree is nondilated.  The spleen, pancreas, and adrenal glands are normal.  Bilateral nonobstructing renal calculi are similar to the prior study, the largest of which is at the lower pole on the left measuring 7 mm.  There are no ureteral calculi and there is no hydronephrosis.  The abdominal aorta is normal in caliber.    Assessment of bowel structures reveals persistent mild abnormal circumferential wall thickening of the colon from the proximal transverse colon to the rectum.  Compared to the prior study, the degree of wall thickening has increased slightly, with slightly increasing pericolonic edema, most notably at the level of the sigmoid colon.  Findings are compatible with acute colitis.  There is no evidence of perforation, pneumatosis, or obstruction.  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    Images of the pelvis demonstrate a normal urinary bladder.  There is no drainable free fluid or lymphadenopathy.    No acute osseous abnormalities are identified.                                        Assessment and Plan     * Colitis  C diff antigen positive, toxin negative, PCR positive   Continue fidaxomicin   GI following   Not much improvement, continues to have upwards of 10 episodes of diarrhea daily    Leukocytosis    WBC normal   Hemodynamically stable   Afebrile      Gastroparesis  Continue symptom support    DVT (deep venous thrombosis)  Continue Xarelto    Open wound of left upper arm  Wound care following      Ulcerative colitis    The patient is on outpatient tremfya    BMI 45.0-49.9, adult  Body mass index is 48.76 kg/m². Morbid obesity complicates all aspects of disease management from diagnostic modalities to treatment. Weight loss encouraged and health benefits explained to patient.         History of supraventricular tachycardia  continue metoprolol.        VTE Risk Mitigation (From admission,  onward)           Ordered     rivaroxaban tablet 20 mg  With dinner         01/03/25 1445     Reason for No Pharmacological VTE Prophylaxis  Once        Question:  Reasons:  Answer:  Active Bleeding    12/31/24 1801     IP VTE HIGH RISK PATIENT  Once         12/31/24 1801     Place sequential compression device  Until discontinued         12/31/24 1801                    Discharge Planning   GERARDO: 1/8/2025     Code Status: Full Code   Medical Readiness for Discharge Date:   Discharge Plan A: Home                        Adrian Estrada DO  Department of Hospital Medicine   [facility]

## 2025-01-06 NOTE — SUBJECTIVE & OBJECTIVE
Interval History:  Patient was seen and examined at bedside this morning.  He reports that he is not doing much better today.  He continues to have upwards of 10 episodes of diarrhea daily.  Remains on fidaxomicin.  Somehow C diff labs have resulted, antigen positive, toxin negative, PCR positive.    Review of Systems  Objective:     Vital Signs (Most Recent):  Temp: 98.2 °F (36.8 °C) (01/06/25 1115)  Pulse: 72 (01/06/25 1115)  Resp: 18 (01/06/25 1240)  BP: (!) 106/55 (01/06/25 1115)  SpO2: 96 % (01/06/25 1115) Vital Signs (24h Range):  Temp:  [98.2 °F (36.8 °C)-98.7 °F (37.1 °C)] 98.2 °F (36.8 °C)  Pulse:  [69-86] 72  Resp:  [15-18] 18  SpO2:  [93 %-96 %] 96 %  BP: (102-112)/(55-69) 106/55     Weight: 132.9 kg (293 lb)  Body mass index is 48.76 kg/m².    Intake/Output Summary (Last 24 hours) at 1/6/2025 1344  Last data filed at 1/6/2025 1236  Gross per 24 hour   Intake 960 ml   Output --   Net 960 ml         Physical Exam      NAD, A/O 3   RRR  CTAB   Soft, nondistended, bowel sounds present, mild diffuse tenderness without rebound or guarding   No edema     Significant Labs: All pertinent labs within the past 24 hours have been reviewed.  CBC:   Recent Labs   Lab 01/05/25  0551 01/06/25  0409   WBC 8.81 8.89   HGB 10.1* 9.4*   HCT 32.6* 31.0*    257     CMP:   Recent Labs   Lab 01/05/25  0551 01/06/25  0409    135*   K 4.2 3.7    103   CO2 29 27   GLU 94 96   BUN 8 11   CREATININE 1.2 1.2   CALCIUM 8.9 8.6*   PROT 6.4 6.0   ALBUMIN 3.4* 3.2*   BILITOT 0.4 0.3   ALKPHOS 59 53*   AST 9* 8*   ALT 6* 6*   ANIONGAP 7* 5*     Magnesium:   Recent Labs   Lab 01/05/25  0551 01/06/25  0409   MG 1.7 1.5*       Significant Imaging: I have reviewed all pertinent imaging results/findings within the past 24 hours.  Imaging Results              X-Ray Chest AP Portable (Final result)  Result time 12/31/24 18:42:01      Final result by Mirta Gayle MD (12/31/24 18:42:01)                   Impression:       No acute findings.      Electronically signed by: Mirta Gayle  Date:    12/31/2024  Time:    18:42               Narrative:    EXAMINATION:  XR CHEST AP PORTABLE    CLINICAL HISTORY:  leukocytosis;    TECHNIQUE:  Single frontal view of the chest was performed.    COMPARISON:  11/27/2024    FINDINGS:  Lungs are clear. No focal consolidation. No pleural effusion. No pneumothorax. Normal heart size.                                       CT Abdomen Pelvis With IV Contrast NO Oral Contrast (Final result)  Result time 12/31/24 15:14:55      Final result by Samuel Boyle MD (12/31/24 15:14:55)                   Impression:      1. CT findings compatible with acute colitis as above, slightly worsened when compared to December 22, 2024.  No evidence of perforation or pneumatosis.  2. Additional stable findings as above.      Electronically signed by: Samuel Boyle  Date:    12/31/2024  Time:    15:14               Narrative:    EXAMINATION:  CT ABDOMEN PELVIS WITH IV CONTRAST    CLINICAL HISTORY:  Abdominal pain, acute, nonlocalized;.    TECHNIQUE:  Post infusion axial images were obtained from the lung bases to the pubic symphysis 100 cc nonionic contrast was utilized for the examination.    COMPARISON:  December 22, 2024    FINDINGS:  The lung bases are unremarkable.    No hepatic mass or contour abnormality is identified.  The gallbladder is absent.  The biliary tree is nondilated.  The spleen, pancreas, and adrenal glands are normal.  Bilateral nonobstructing renal calculi are similar to the prior study, the largest of which is at the lower pole on the left measuring 7 mm.  There are no ureteral calculi and there is no hydronephrosis.  The abdominal aorta is normal in caliber.    Assessment of bowel structures reveals persistent mild abnormal circumferential wall thickening of the colon from the proximal transverse colon to the rectum.  Compared to the prior study, the degree of wall thickening has  increased slightly, with slightly increasing pericolonic edema, most notably at the level of the sigmoid colon.  Findings are compatible with acute colitis.  There is no evidence of perforation, pneumatosis, or obstruction.  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    Images of the pelvis demonstrate a normal urinary bladder.  There is no drainable free fluid or lymphadenopathy.    No acute osseous abnormalities are identified.

## 2025-01-06 NOTE — ASSESSMENT & PLAN NOTE
C diff antigen positive, toxin negative, PCR positive   Continue fidaxomicin   GI following   Not much improvement, continues to have upwards of 10 episodes of diarrhea daily

## 2025-01-07 ENCOUNTER — TELEPHONE (OUTPATIENT)
Dept: FAMILY MEDICINE | Facility: CLINIC | Age: 50
End: 2025-01-07
Payer: COMMERCIAL

## 2025-01-07 LAB
ALBUMIN SERPL BCP-MCNC: 3.2 G/DL (ref 3.5–5.2)
ALP SERPL-CCNC: 55 U/L (ref 55–135)
ALT SERPL W/O P-5'-P-CCNC: 6 U/L (ref 10–44)
ANION GAP SERPL CALC-SCNC: 10 MMOL/L (ref 8–16)
AST SERPL-CCNC: 9 U/L (ref 10–40)
BASOPHILS # BLD AUTO: 0.05 K/UL (ref 0–0.2)
BASOPHILS NFR BLD: 0.5 % (ref 0–1.9)
BILIRUB SERPL-MCNC: 0.3 MG/DL (ref 0.1–1)
BUN SERPL-MCNC: 9 MG/DL (ref 6–20)
CALCIUM SERPL-MCNC: 8.4 MG/DL (ref 8.7–10.5)
CHLORIDE SERPL-SCNC: 102 MMOL/L (ref 95–110)
CMV DNA SERPL NAA+PROBE-ACNC: NORMAL IU/ML
CMV DNA SERPL NAA+PROBE-LOG IU: NORMAL {LOG_IU}/ML
CO2 SERPL-SCNC: 25 MMOL/L (ref 23–29)
CREAT SERPL-MCNC: 1 MG/DL (ref 0.5–1.4)
DIFFERENTIAL METHOD BLD: ABNORMAL
EOSINOPHIL # BLD AUTO: 0.6 K/UL (ref 0–0.5)
EOSINOPHIL NFR BLD: 5.9 % (ref 0–8)
ERYTHROCYTE [DISTWIDTH] IN BLOOD BY AUTOMATED COUNT: 16.8 % (ref 11.5–14.5)
EST. GFR  (NO RACE VARIABLE): >60 ML/MIN/1.73 M^2
GLUCOSE SERPL-MCNC: 103 MG/DL (ref 70–110)
HCT VFR BLD AUTO: 31.2 % (ref 40–54)
HGB BLD-MCNC: 9.8 G/DL (ref 14–18)
IMM GRANULOCYTES # BLD AUTO: 0.05 K/UL (ref 0–0.04)
IMM GRANULOCYTES NFR BLD AUTO: 0.5 % (ref 0–0.5)
LYMPHOCYTES # BLD AUTO: 1.1 K/UL (ref 1–4.8)
LYMPHOCYTES NFR BLD: 11.3 % (ref 18–48)
MAGNESIUM SERPL-MCNC: 1.5 MG/DL (ref 1.6–2.6)
MCH RBC QN AUTO: 27.2 PG (ref 27–31)
MCHC RBC AUTO-ENTMCNC: 31.4 G/DL (ref 32–36)
MCV RBC AUTO: 87 FL (ref 82–98)
MONOCYTES # BLD AUTO: 1.2 K/UL (ref 0.3–1)
MONOCYTES NFR BLD: 13 % (ref 4–15)
NEUTROPHILS # BLD AUTO: 6.5 K/UL (ref 1.8–7.7)
NEUTROPHILS NFR BLD: 68.8 % (ref 38–73)
NRBC BLD-RTO: 0 /100 WBC
PLATELET # BLD AUTO: 267 K/UL (ref 150–450)
PMV BLD AUTO: 9.8 FL (ref 9.2–12.9)
POTASSIUM SERPL-SCNC: 4 MMOL/L (ref 3.5–5.1)
PROT SERPL-MCNC: 6.2 G/DL (ref 6–8.4)
RBC # BLD AUTO: 3.6 M/UL (ref 4.6–6.2)
SODIUM SERPL-SCNC: 137 MMOL/L (ref 136–145)
WBC # BLD AUTO: 9.49 K/UL (ref 3.9–12.7)

## 2025-01-07 PROCEDURE — 99223 1ST HOSP IP/OBS HIGH 75: CPT | Mod: ,,, | Performed by: STUDENT IN AN ORGANIZED HEALTH CARE EDUCATION/TRAINING PROGRAM

## 2025-01-07 PROCEDURE — 80053 COMPREHEN METABOLIC PANEL: CPT | Performed by: HOSPITALIST

## 2025-01-07 PROCEDURE — 63600175 PHARM REV CODE 636 W HCPCS: Performed by: INTERNAL MEDICINE

## 2025-01-07 PROCEDURE — 25000003 PHARM REV CODE 250: Performed by: INTERNAL MEDICINE

## 2025-01-07 PROCEDURE — 99231 SBSQ HOSP IP/OBS SF/LOW 25: CPT | Mod: ,,, | Performed by: FAMILY MEDICINE

## 2025-01-07 PROCEDURE — 36415 COLL VENOUS BLD VENIPUNCTURE: CPT | Performed by: HOSPITALIST

## 2025-01-07 PROCEDURE — 27000207 HC ISOLATION

## 2025-01-07 PROCEDURE — 12000002 HC ACUTE/MED SURGE SEMI-PRIVATE ROOM

## 2025-01-07 PROCEDURE — 83735 ASSAY OF MAGNESIUM: CPT | Performed by: HOSPITALIST

## 2025-01-07 PROCEDURE — 85025 COMPLETE CBC W/AUTO DIFF WBC: CPT | Performed by: HOSPITALIST

## 2025-01-07 PROCEDURE — 25000003 PHARM REV CODE 250

## 2025-01-07 PROCEDURE — 25000003 PHARM REV CODE 250: Performed by: STUDENT IN AN ORGANIZED HEALTH CARE EDUCATION/TRAINING PROGRAM

## 2025-01-07 PROCEDURE — 25000003 PHARM REV CODE 250: Performed by: HOSPITALIST

## 2025-01-07 RX ORDER — MAGNESIUM CHLORIDE 64 MG
64 TABLET, DELAYED RELEASE (ENTERIC COATED) ORAL DAILY
Status: DISCONTINUED | OUTPATIENT
Start: 2025-01-07 | End: 2025-01-09 | Stop reason: HOSPADM

## 2025-01-07 RX ORDER — SODIUM CHLORIDE 9 MG/ML
INJECTION, SOLUTION INTRAVENOUS CONTINUOUS
Status: DISCONTINUED | OUTPATIENT
Start: 2025-01-07 | End: 2025-01-09 | Stop reason: HOSPADM

## 2025-01-07 RX ADMIN — ONDANSETRON 4 MG: 2 INJECTION INTRAMUSCULAR; INTRAVENOUS at 09:01

## 2025-01-07 RX ADMIN — FIDAXOMICIN 200 MG: 200 TABLET, FILM COATED ORAL at 08:01

## 2025-01-07 RX ADMIN — ZOLPIDEM TARTRATE 10 MG: 5 TABLET, COATED ORAL at 09:01

## 2025-01-07 RX ADMIN — HYDROCODONE BITARTRATE AND ACETAMINOPHEN 1 TABLET: 5; 325 TABLET ORAL at 09:01

## 2025-01-07 RX ADMIN — DIPHENHYDRAMINE HYDROCHLORIDE 25 MG: 25 CAPSULE ORAL at 11:01

## 2025-01-07 RX ADMIN — HYDROCODONE BITARTRATE AND ACETAMINOPHEN 1 TABLET: 5; 325 TABLET ORAL at 06:01

## 2025-01-07 RX ADMIN — BUSPIRONE HYDROCHLORIDE 15 MG: 5 TABLET ORAL at 08:01

## 2025-01-07 RX ADMIN — ESCITALOPRAM OXALATE 20 MG: 10 TABLET ORAL at 08:01

## 2025-01-07 RX ADMIN — MORPHINE SULFATE 2 MG: 2 INJECTION, SOLUTION INTRAMUSCULAR; INTRAVENOUS at 02:01

## 2025-01-07 RX ADMIN — ONDANSETRON 4 MG: 2 INJECTION INTRAMUSCULAR; INTRAVENOUS at 02:01

## 2025-01-07 RX ADMIN — BUSPIRONE HYDROCHLORIDE 15 MG: 5 TABLET ORAL at 09:01

## 2025-01-07 RX ADMIN — FIDAXOMICIN 200 MG: 200 TABLET, FILM COATED ORAL at 10:01

## 2025-01-07 RX ADMIN — ATORVASTATIN CALCIUM 40 MG: 40 TABLET, FILM COATED ORAL at 09:01

## 2025-01-07 RX ADMIN — DIPHENHYDRAMINE HYDROCHLORIDE 25 MG: 25 CAPSULE ORAL at 06:01

## 2025-01-07 RX ADMIN — HYDROCODONE BITARTRATE AND ACETAMINOPHEN 1 TABLET: 5; 325 TABLET ORAL at 03:01

## 2025-01-07 RX ADMIN — HYDROCORTISONE: 1 CREAM TOPICAL at 08:01

## 2025-01-07 RX ADMIN — HYOSCYAMINE SULFATE 0.25 MG: 0.12 TABLET SUBLINGUAL at 08:01

## 2025-01-07 RX ADMIN — Medication 6 MG: at 09:01

## 2025-01-07 RX ADMIN — SODIUM CHLORIDE: 9 INJECTION, SOLUTION INTRAVENOUS at 12:01

## 2025-01-07 RX ADMIN — HYOSCYAMINE SULFATE 0.25 MG: 0.12 TABLET SUBLINGUAL at 11:01

## 2025-01-07 RX ADMIN — MAGNESIUM 64 MG (MAGNESIUM CHLORIDE) TABLET,DELAYED RELEASE 64 MG: at 06:01

## 2025-01-07 RX ADMIN — ONDANSETRON 4 MG: 2 INJECTION INTRAMUSCULAR; INTRAVENOUS at 03:01

## 2025-01-07 RX ADMIN — BUSPIRONE HYDROCHLORIDE 15 MG: 5 TABLET ORAL at 02:01

## 2025-01-07 RX ADMIN — DIPHENHYDRAMINE HYDROCHLORIDE 25 MG: 25 CAPSULE ORAL at 03:01

## 2025-01-07 RX ADMIN — Medication 800 MG: at 08:01

## 2025-01-07 RX ADMIN — HYOSCYAMINE SULFATE 0.25 MG: 0.12 TABLET SUBLINGUAL at 04:01

## 2025-01-07 RX ADMIN — HYDROCODONE BITARTRATE AND ACETAMINOPHEN 1 TABLET: 5; 325 TABLET ORAL at 11:01

## 2025-01-07 RX ADMIN — Medication 800 MG: at 06:01

## 2025-01-07 RX ADMIN — MORPHINE SULFATE 2 MG: 2 INJECTION, SOLUTION INTRAMUSCULAR; INTRAVENOUS at 06:01

## 2025-01-07 RX ADMIN — METOPROLOL SUCCINATE 50 MG: 50 TABLET, FILM COATED, EXTENDED RELEASE ORAL at 08:01

## 2025-01-07 RX ADMIN — DIPHENHYDRAMINE HYDROCHLORIDE 25 MG: 25 CAPSULE ORAL at 09:01

## 2025-01-07 RX ADMIN — RIVAROXABAN 20 MG: 10 TABLET, FILM COATED ORAL at 04:01

## 2025-01-07 RX ADMIN — MORPHINE SULFATE 2 MG: 2 INJECTION, SOLUTION INTRAMUSCULAR; INTRAVENOUS at 09:01

## 2025-01-07 RX ADMIN — Medication 800 MG: at 12:01

## 2025-01-07 NOTE — ASSESSMENT & PLAN NOTE
C diff antigen positive, toxin negative, PCR positive   Continue fidaxomicin   Not much improvement, continues to have upwards of 12 episodes of diarrhea daily  GI reconsulted  Infectious Disease consulted  Labs unremarkable, afebrile, hemodynamically stable

## 2025-01-07 NOTE — TELEPHONE ENCOUNTER
----- Message from Med Assistant F[place] sent at 1/7/2025  2:57 PM CST -----  Regarding: FW: [employer]    ----- Message -----  From: Melony Sepulveda RN  Sent: 1/2/2025   2:28 PM CST  To: Shy Hurley LPN; Mark Elizalde MA; #  Subject: [employer]                        Pt expected to discharge today or tomorrow. Will need hospital follow up appointment. Please contact pt with appointment date and time. Thank you, Melony Sepulveda RN CM

## 2025-01-07 NOTE — CONSULTS
[facility]   Department of Infectious Disease  Consult Note        PATIENT NAME: Jacoby Jean-Baptiste  MRN: [id number]  TODAY'S DATE: 01/07/2025  ADMIT DATE: 12/31/2024  LOS: 5 days    CHIEF COMPLAINT: Abdominal Pain (Reports history of UC ), Vomiting, and Diarrhea      PRINCIPLE PROBLEM: Colitis    REASON FOR CONSULT: Recurrent, un-improving C diff    ASSESSMENT and PLAN     Recurrent/relapse C diff colitis in the setting of ulcerative colitis currently on Tremfya   Stool for C diff antigen positive, toxin negative, PCR positive    History of left arm SSTI, improved     PMHx:  Aspergillus pneumonia status post treatment, 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 twice a day will complete a 20 day course followed by 1 pill every other day until resolution  Special contact precautions  Incentive spirometry   Monitor stool output    Discussed with patient, nursing, Dr. Jean/GI    Thank you for this consult. Please send [employer] secure chat with any questions.    Antibiotics (From admission, onward)      Start     Stop Route Frequency Ordered    01/02/25 2100  fidaxomicin tablet 200 mg         -- Oral 2 times daily 01/02/25 1418          Antifungals (From admission, onward)      None           Antivirals (From admission, onward)      None            HPI      Jacoby Jean-Baptiste is a 49 y.o. male very well known to our service, morbidly obese, BMI 48.7 kg/m2, he has past medical  history of ulcerative colitis on Tremfya follows with GI outpatient, recurrent/relapse serial episode of C diff colitis status post fecal microbiology transplant in March and September of 2023, diabetes, HTN, HLD, anemia, anxiety/insomnia, hep B positive serology, GERD, and Aspergillus/atypical cavitary pneumonia status post voriconazole in December of 2023.  Patient presented to the emergency room on new year's [place] for worsening diarrhea, as per patient symptoms started mid December he was going to the bathroom 10-12 times a day, some episodes with bloody stool, associated nausea, abdominal cramps, no vomit.  He complains of chills, denies fevers or night sweats.  The patient also complaining of headache, no cough or chest pain or shortness of breath.  Of note, he has a healed skin soft tissue lesion on his left arm, sees wound care outpatient.      Labs on admission leukocytosis 13.5, left shift 77.6%, H&H 10.8/35, platelet count 322, stable electrolytes   CRP on admission 2.8   UA negative   GI PCR negative   Chest x-ray clear   CT abdomen/pelvis on 12/31 consistent with acute colitis slightly worsened when compared to December 22nd.  No evidence of perforation or pneumatosis.    Patient empirically started on ceftriaxone ciprofloxacin and metronidazole on 12/31, both x1 dose.    Hospital course complicated by C diff colitis, on fidaxomicin, today is day 6.    Antibiotic history:    Ceftriaxone 12/31 1 g x 1   Ciprofloxacin 12/31 400 mg IV x1   Metronidazole 12/31 500 mg IV x1    Microbiology:    GI PCR negative   Stool for C diff C diff antigen positive, toxin negative, C diff PCR positive    Social History  Marital Status: Single  Alcohol History:  reports current alcohol use.  Tobacco History:  reports that he has quit smoking. His smoking use included cigarettes. He started smoking about 32 years ago. He has a 30 pack-year smoking history. He has never used smokeless tobacco.  Drug History:  reports that he  does not currently use drugs.  He is currently unemployed, awaiting for disability.  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    Review of patient's allergies indicates:  No Known Allergies  Past Medical History:   Diagnosis Date    C. difficile colitis     Cavitary pneumonia 11/2023    pos aspergillus serology    Diabetes mellitus 01/2022    Hyperlipidemia 2015    Hypertension 2015    Insomnia 2015    Ulcerative colitis      Past Surgical History:   Procedure Laterality Date    BRONCHOSCOPY WITH FLUOROSCOPY Left 11/20/2023    Procedure: BRONCHOSCOPY, WITH FLUOROSCOPY;  Surgeon: Lisa Villarreal MD;  Location: [facility];  Service: Pulmonary;  Laterality: Left;    BRONCHOSCOPY WITH FLUOROSCOPY N/A 11/30/2023    Procedure: BRONCHOSCOPY, WITH FLUOROSCOPY;  Surgeon: Lisa Villarreal MD;  Location: [facility];  Service: Pulmonary;  Laterality: N/A;    CARDIAC ELECTROPHYSIOLOGY MAPPING AND ABLATION  2017    SVT    CHOLECYSTECTOMY  2011    COLONOSCOPY N/A 5/3/2022    Procedure: COLONOSCOPY;  Surgeon: Shan Jean III, MD;  Location: [facility];  Service: Endoscopy;  Laterality: N/A;    COLONOSCOPY N/A 3/16/2024    Procedure: COLONOSCOPY;  Surgeon: ALEKSANDER Ramos MD;  Location: [facility];  Service: Endoscopy;  Laterality: N/A;    COLONOSCOPY WITH FECAL MICROBIOTA TRANSFER N/A 3/21/2023    Procedure: COLONOSCOPY, WITH FECAL MICROBIOTA TRANSFER;  Surgeon: David Millan MD;  Location: [facility];  Service: Endoscopy;  Laterality: N/A;    ESOPHAGOGASTRODUODENOSCOPY N/A 4/24/2023    Procedure: EGD (ESOPHAGOGASTRODUODENOSCOPY);  Surgeon: Shan Jean III, MD;  Location: [facility];  Service: Endoscopy;  Laterality: N/A;    ESOPHAGOGASTRODUODENOSCOPY N/A 6/5/2024    Procedure: EGD (ESOPHAGOGASTRODUODENOSCOPY);  Surgeon: Odalis Mccabe MD;  Location: [facility];  Service: Endoscopy;  Laterality: N/A;    FLEXIBLE SIGMOIDOSCOPY N/A 6/5/2024    Procedure: SIGMOIDOSCOPY, FLEXIBLE;  Surgeon:  Odalis Mccabe MD;  Location: [facility];  Service: Endoscopy;  Laterality: N/A;    FLEXIBLE SIGMOIDOSCOPY N/A 7/16/2024    Procedure: SIGMOIDOSCOPY, FLEXIBLE;  Surgeon: Shan Jean III, MD;  Location: [facility] ENDO;  Service: Endoscopy;  Laterality: N/A;    FLEXIBLE SIGMOIDOSCOPY N/A 8/13/2024    Procedure: SIGMOIDOSCOPY, FLEXIBLE;  Surgeon: Shan Jean III, MD;  Location: [facility] ENDO;  Service: Endoscopy;  Laterality: N/A;    GANGLION CYST EXCISION Left 1992    UMBILICAL HERNIA REPAIR  2011    with mesh     Family History   Problem Relation Name Age of Onset    Asthma Mother         SUBJECTIVE     Review of systems  Constitutional:  Denies fevers, +chills, night sweats, loss of appetite.  HEENT: Denies visual changes, ear pain, sinus congestion, mouth pain or trouble swallowing, sore throat or dental pain.  Neck: Denies neck pain or lumps.  Respiratory: Denies shortness of breath, coughing, wheezing or hemoptysis.  Cardiovascular:  Denies chest pain, palpitations or edema.  Gastrointestinal: Denies  nausea, vomiting, + diarrhea, frequent stools at least 10-12 times a day  Genitourinary:  Denies dysuria, frequency, urgency or hematuria   Musculoskeletal:  Denies joint pain or swelling, difficulty walking.    Skin:  Prior skin lesions left arm improving.  Denies rash or itching.  Neurologic:  Denies motor or sensory loss,+ headaches or dizziness.    Psychiatric:  Denies changes in mood or behavior.     OBJECTIVE   Temp:  [98 °F (36.7 °C)-98.7 °F (37.1 °C)] 98.2 °F (36.8 °C)  Pulse:  [67-77] 75  Resp:  [14-18] 16  SpO2:  [93 %-98 %] 96 %  BP: (103-117)/(56-81) 116/63  Temp:  [98 °F (36.7 °C)-98.7 °F (37.1 °C)]   Temp: 98.2 °F (36.8 °C) (01/07/25 1152)  Pulse: 75 (01/07/25 1152)  Resp: 16 (01/07/25 1438)  BP: 116/63 (01/07/25 1152)  SpO2: 96 % (01/07/25 1152)    Intake/Output Summary (Last 24 hours) at 1/7/2025 1455  Last data filed at 1/7/2025 1413  Gross per 24 hour   Intake 670 ml   Output --   Net  670 ml       Physical Exam  General:  Morbidly obese  male lying in bed, breathing comfortable on room air  Eyes: Eyes with no icterus or injection. Vision grossly normal  Ears: Hearing grossly normal.  Nose: Nares patent  Mouth: Moist mucous membranes, dentition is terrible, very poor oral hygiene, severe decay. No ulcerations, erythema or exudates.  Neck: Supple, no tenderness to palpation.  Cardiovascular: Regular rate and rhythm, no murmurs, no edema.    Respiratory:  Clear to auscultation bilaterally, no tachypnea or increased work of breathing.  Gastrointestinal:  Soft and obese with active bowel sounds, no tenderness to palpation, no distention.  Genitourinary:  No suprapubic tenderness.  Musculoskeletal:  Moves all extremities with good strength.    Skin:  Warm and dry, resolving multiple scabs on left arm, patient reports he has been scratching, no active evidence of cellulitis  Neuro:   Oriented, conversant, follows commands.  Psych: Good mood, normal affect.   VAD: PIV  ISOLATION:  Special contact/C diff       Wounds:  1/2/25:       Significant Labs: All pertinent labs within the past 24 hours have been reviewed.    CBC LAST 7  Recent Labs   Lab 01/01/25  0401 01/02/25  0444 01/03/25  0835 01/04/25  0701 01/05/25  0551 01/06/25  0409 01/07/25  0523   WBC 10.46 9.40 8.94 10.42 8.81 8.89 9.49   RBC 3.81* 3.88* 3.80* 3.87* 3.74* 3.58* 3.60*   HGB 10.2* 10.2* 9.9* 10.3* 10.1* 9.4* 9.8*   HCT 34.4* 35.0* 33.6* 33.2* 32.6* 31.0* 31.2*   MCV 90 90 88 86 87 87 87   MCH 26.8* 26.3* 26.1* 26.6* 27.0 26.3* 27.2   MCHC 29.7* 29.1* 29.5* 31.0* 31.0* 30.3* 31.4*   RDW 17.7* 17.3* 17.1* 17.1* 17.2* 16.9* 16.8*    278 253 278 273 257 267   MPV 10.0 10.4 10.0 9.7 9.6 9.9 9.8   GRAN 66.1  6.9 66.9  6.3 69.8  6.3 74.6*  7.8* 64.3  5.7 64.4  5.7 68.8  6.5   LYMPH 15.2*  1.6 16.0*  1.5 13.4*  1.2 9.1*  1.0 13.8*  1.2 14.1*  1.3 11.3*  1.1   MONO 14.1  1.5* 12.4  1.2* 11.1  1.0 11.6  1.2* 14.9  " 1.3* 12.9  1.2* 13.0  1.2*   BASO 0.07 0.06 0.06 0.04 0.04 0.04 0.05   NRBC 0 0 0 0 0 0 0       CHEMISTRY LAST 7  Recent Labs   Lab 01/01/25  0401 01/02/25  0444 01/03/25  0835 01/04/25  0701 01/05/25  0551 01/06/25  0409 01/07/25  0523   * 136 136 137 138 135* 137   K 3.9 4.0 3.7 3.9 4.2 3.7 4.0    106 103 102 102 103 102   CO2 23 25 28 27 29 27 25   ANIONGAP 8 5* 5* 8 7* 5* 10   BUN 11 9 7 7 8 11 9   CREATININE 1.1 1.2 1.1 1.1 1.2 1.2 1.0   GLU 77 78 85 105 94 96 103   CALCIUM 8.8 8.6* 8.9 8.8 8.9 8.6* 8.4*   MG 1.7 1.7 1.6 1.5* 1.7 1.5* 1.5*   ALBUMIN 3.5 3.5 3.4* 3.4* 3.4* 3.2* 3.2*   PROT 6.3 6.3 6.4 6.2 6.4 6.0 6.2   ALKPHOS 69 69 66 60 59 53* 55   ALT 7* 6* 6* 6* 6* 6* 6*   AST 10 9* 8* 7* 9* 8* 9*   BILITOT 0.4 0.4 0.4 0.3 0.4 0.3 0.3       Estimated Creatinine Clearance: 112.6 mL/min (based on SCr of 1 mg/dL).    INFLAMMATORY/PROCAL  LAST 7  No results for input(s): "PROCAL", "ESR", "CRP" in the last 168 hours.  No results found for: "ESR"  CRP   Date Value Ref Range Status   12/31/2024 2.80 (H) <1.00 mg/dL Final     Comment:     CRP-Normal Application expected values:   <1.0        mg/dL   Normal Range  1.0 - 5.0  mg/dL   Indicates mild inflammation  5.0 - 10.0 mg/dL   Indicates severe inflammation  >10.0        mg/dL   Represents serious processes and   frequently         indicates the presence of a bacterial   infection.      12/22/2024 2.10 (H) <1.00 mg/dL Final     Comment:     CRP-Normal Application expected values:   <1.0        mg/dL   Normal Range  1.0 - 5.0  mg/dL   Indicates mild inflammation  5.0 - 10.0 mg/dL   Indicates severe inflammation  >10.0        mg/dL   Represents serious processes and   frequently         indicates the presence of a bacterial   infection.      12/06/2024 1.90 (H) <1.00 mg/dL Final     Comment:     CRP-Normal Application expected values:   <1.0        mg/dL   Normal Range  1.0 - 5.0  mg/dL   Indicates mild inflammation  5.0 - 10.0 mg/dL   Indicates " severe inflammation  >10.0        mg/dL   Represents serious processes and   frequently         indicates the presence of a bacterial   infection.      11/01/2024 2.90 (H) <1.00 mg/dL Final     Comment:     CRP-Normal Application expected values:   <1.0        mg/dL   Normal Range  1.0 - 5.0  mg/dL   Indicates mild inflammation  5.0 - 10.0 mg/dL   Indicates severe inflammation  >10.0        mg/dL   Represents serious processes and   frequently         indicates the presence of a bacterial   infection.      08/11/2024 4.20 (H) <1.00 mg/dL Final     Comment:     CRP-Normal Application expected values:   <1.0        mg/dL   Normal Range  1.0 - 5.0  mg/dL   Indicates mild inflammation  5.0 - 10.0 mg/dL   Indicates severe inflammation  >10.0        mg/dL   Represents serious processes and   frequently         indicates the presence of a bacterial   infection.      07/15/2024 1.80 (H) <1.00 mg/dL Final     Comment:     CRP-Normal Application expected values:   <1.0        mg/dL   Normal Range  1.0 - 5.0  mg/dL   Indicates mild inflammation  5.0 - 10.0 mg/dL   Indicates severe inflammation  >10.0        mg/dL   Represents serious processes and   frequently         indicates the presence of a bacterial   infection.      07/12/2024 1.30 (H) <1.00 mg/dL Final     Comment:     CRP-Normal Application expected values:   <1.0        mg/dL   Normal Range  1.0 - 5.0  mg/dL   Indicates mild inflammation  5.0 - 10.0 mg/dL   Indicates severe inflammation  >10.0        mg/dL   Represents serious processes and   frequently         indicates the presence of a bacterial   infection.          PRIOR  MICROBIOLOGY:    No results found for the last 90 days.        LAST 7 DAYS MICROBIOLOGY   Microbiology Results (last 7 days)       Procedure Component Value Units Date/Time    C Diff Toxin by PCR [[id number]]  (Abnormal) Collected: 01/04/25 1808    Order Status: Completed Updated: 01/06/25 1235     C. diff PCR Positive     Comment: critical  result(s) called and verbal readback obtained from   Sariah Estrada RN [facility]wing by [facility] 01/06/2025 12:35         Narrative:         critical result(s) called and verbal readback obtained from   Sariah Estrada RN [facility] by [facility] 01/06/2025 12:35    Clostridium difficile EIA [[id number]]  (Abnormal) Collected: 01/04/25 1808    Order Status: Completed Updated: 01/06/25 1059     C. diff Antigen Positive     C difficile Toxins A+B, EIA Negative     Comment: Testing not recommended for children <24 months old.       Clostridium difficile EIA [[id number]] Collected: 01/01/25 1408    Order Status: Canceled Specimen: Stool     C Diff Toxin by PCR [[id number]] Collected: 01/01/25 1408    Order Status: Canceled               CURRENT/PREVIOUS VISIT EKG  Results for orders placed or performed during the hospital encounter of 12/22/24   EKG 12-lead    Collection Time: 12/22/24  8:17 AM   Result Value Ref Range    QRS Duration 74 ms    [facility] QTC Calculation 440 ms    Narrative    Test Reason : R11.10,    Vent. Rate :  87 BPM     Atrial Rate :  87 BPM     P-R Int : 146 ms          QRS Dur :  74 ms      QT Int : 366 ms       P-R-T Axes :  58  22  45 degrees    QTcB Int : 440 ms    Normal sinus rhythm  Normal ECG  Confirmed by Josh Mathis ([id number]) on 12/30/2024 6:02:46 PM    Referred By: AAAREFERRAL SELF           Confirmed By: Josh Mathis          Significant Imaging: I have reviewed all relevant and available imaging results/findings within the past 24 hours.      I spent a total of 76 minutes on the day of the visit.This includes face to face time and non-face to face time preparing to see the patient (eg, review of tests), obtaining and/or reviewing separately obtained history, documenting clinical information in the electronic or other health record, independently interpreting results and communicating results to the patient/family/caregiver, or care coordinator.    Ophelia Galindo MD  Date of Service:  01/07/2025      This note was created using [employer] voice recognition software that occasionally misinterpreted phrases or words.

## 2025-01-07 NOTE — PROGRESS NOTES
Patient Name: Jacoby Jean-Baptiste  Patient MRN: [id number]  Patient : 1975    Admit Date: 2024  Service date: 2025    Reason for Consult: diarrhea / C. Diff / UC    PCP: Hunter Aguilar III, MD    S: Still having diarrhea / cramping. Jose PO    Inpatient Medications:   atorvastatin  40 mg Oral QHS    busPIRone  15 mg Oral TID    EScitalopram oxalate  20 mg Oral Daily    famotidine  20 mg Oral BID    fidaxomicin  200 mg Oral BID    hydrocortisone   Topical (Top) BID    hyoscyamine  0.25 mg Sublingual Q8H    metoprolol succinate  50 mg Oral Daily    rivaroxaban  20 mg Oral Daily with dinner       Current Facility-Administered Medications:     acetaminophen, 650 mg, Oral, Q8H PRN    acetaminophen, 650 mg, Oral, Q4H PRN    aluminum-magnesium hydroxide-simethicone, 30 mL, Oral, QID PRN    dextrose 50%, 12.5 g, Intravenous, PRN    dextrose 50%, 25 g, Intravenous, PRN    diphenhydrAMINE, 25 mg, Oral, Q6H PRN    glucagon (human recombinant), 1 mg, Intramuscular, PRN    glucose, 16 g, Oral, PRN    glucose, 24 g, Oral, PRN    HYDROcodone-acetaminophen, 1 tablet, Oral, Q6H PRN    magnesium oxide, 800 mg, Oral, PRN    magnesium oxide, 800 mg, Oral, PRN    melatonin, 6 mg, Oral, Nightly PRN    metoclopramide HCl, 5 mg, Oral, TID AC PRN    morphine, 2 mg, Intravenous, Q6H PRN    naloxone, 0.02 mg, Intravenous, PRN    ondansetron, 4 mg, Intravenous, Q6H PRN    potassium bicarbonate, 35 mEq, Oral, PRN    potassium bicarbonate, 50 mEq, Oral, PRN    potassium bicarbonate, 60 mEq, Oral, PRN    potassium, sodium phosphates, 2 packet, Oral, PRN    potassium, sodium phosphates, 2 packet, Oral, PRN    potassium, sodium phosphates, 2 packet, Oral, PRN    sodium chloride 0.9%, 2 mL, Intravenous, Q12H PRN    zolpidem, 10 mg, Oral, Nightly PRN    Review of Systems:  A 10 point review of systems was performed and was normal, except as mentioned in the HPI, including constitutional, HEENT, heme, lymph, cardiovascular, respiratory,  "gastrointestinal, genitourinary, neurologic, endocrine, psychiatric and musculoskeletal.      OBJECTIVE:    Physical Exam:  24 Hour Vital Sign Ranges: Temp:  [98 °F (36.7 °C)-98.3 °F (36.8 °C)] 98.2 °F (36.8 °C)  Pulse:  [64-77] 64  Resp:  [14-18] 16  SpO2:  [93 %-98 %] 98 %  BP: (103-128)/(62-81) 128/71  Most recent vitals: /71   Pulse 64   Temp 98.2 °F (36.8 °C) (Oral)   Resp 16   Ht 5' 5" (1.651 m)   Wt 132.9 kg (293 lb)   SpO2 98%   BMI 48.76 kg/m²    GEN: well-developed, well-nourished, awake and alert, non-toxic appearing adult  HEENT: PERRL, sclera anicteric, oral mucosa pink and moist without lesion  NECK: trachea midline; Good ROM  CV: regular rate and rhythm, no murmurs or gallops  RESP: clear to auscultation bilaterally, no wheezes, rhonci or rales  ABD: soft, non-tender, non-distended, normal bowel sounds  EXT: no swelling or edema, 2+ pulses distally  SKIN: no rashes or jaundice  PSYCH: normal affect    Labs:   Recent Labs     01/05/25  0551 01/06/25  0409 01/07/25  0523   WBC 8.81 8.89 9.49   MCV 87 87 87    257 267     Recent Labs     01/05/25  0551 01/06/25  0409 01/07/25  0523    135* 137   K 4.2 3.7 4.0    103 102   CO2 29 27 25   BUN 8 11 9   GLU 94 96 103     No results for input(s): "ALB" in the last 72 hours.    Invalid input(s): "ALKP", "SGOT", "SGPT", "TBIL", "DBIL", "TPRO"  No results for input(s): "PT", "INR", "PTT" in the last 72 hours.      Radiology Review:  X-Ray KUB   Final Result      Unremarkable abdominal radiograph.         Electronically signed by: Charles Baker   Date:    01/07/2025   Time:    16:08      X-Ray Chest AP Portable   Final Result      No acute findings.         Electronically signed by: Mirta Gayle   Date:    12/31/2024   Time:    18:42      CT Abdomen Pelvis With IV Contrast NO Oral Contrast   Final Result      1. CT findings compatible with acute colitis as above, slightly worsened when compared to December 22, 2024.  No " evidence of perforation or pneumatosis.   2. Additional stable findings as above.         Electronically signed by: Samuel oByle   Date:    12/31/2024   Time:    15:14            IMPRESSION / RECOMMENDATIONS:  48 y/o M w/ho HLD, DM on ozempic, HTN, LOLIS, umbilical hernia repair, tobacco use, C. diff s/p Vanco / dificid / FMT / rebyota, ulcerative colitis on Tremfaya (2doses), anemia presents for evaluation of diarrhea. C. Diff Ag+ but toxin negative however PCR was + and started on Dificid. Last calprotectin in 12/'24 had improved to 363 (previously >1000). Extensive discussions w/ patient / ID / colo-rectal. Some concerns that this is C. Diff more than UC sami given relatively acute onset loose stools. Patient is frustrated w/ his refractory disease and wants to discuss colectomy w/ surgery. Extensive discussions regarding risks / benefits / co-morbidities regarding the surgery vs medical Rx.     -D/c mag ox; start MagCl daily  -Surgery consult to have discussions regarding colectomy  -If no surgery done, will likely pursue stool xplant as abx ineffective in past w/ good results w/ FMT/Rebyota  -Continue tremfaya outpatient if no colectomy done        Shan Jean III  1/7/2025  4:46 PM

## 2025-01-07 NOTE — PROGRESS NOTES
Formerly [facility], [facility] Medicine  Progress Note    Patient Name: Jacoby Jean-Baptiste  MRN: [id number]  Patient Class: IP- Inpatient   Admission Date: 12/31/2024  Length of Stay: 5 days  Attending Physician: Adrian Estrada DO  Primary Care Provider: Hunter Aguilar III, MD        Subjective     Principal Problem:Colitis        HPI:  50 yo male with PMH of ulcerative colitis presented because of worsening abdominal pain and diarrhea.  According to the patient, he has a h/o on and off diarrhea and abdominal cramp related to his ulcerative colitis.  He was recently admitted on 12/22 because of abdominal pain/nausea/vomiting/hematochezia.  At that time, his CT showed pan colitis, was seen by GI who suggested that his symptoms are likely not secondary to ulcerative colitis flare.  After his discharge, he started to have perfused diarrhea, and abdominal cramps/pain.  He is having anywhere between 10-15 bowel movements a day, some of them were hematochezia.  His abdominal pain is described as crampy, severe, 10/10 on pain scale.  He has been having chills with no fever. As a result, he decided to come back to the ER for evaluation.    In the ER, vitals showed blood pressure of 126/83, heart rate of 84, respiratory rate of 18, afebrile satting 99% on room air.  CBC with white count of 13.5, and hemoglobin of 10.8.  CBC with sodium of 134.  CRP is mildly elevated at 2.8. CT abdomen/pelvis showed findings compatible with acute colitis slightly worsened when compared to December 22, 2024. No evidence of perforation or pneumatosis.  He was given Reglan, Benadryl and Zofran.  He will be admitted to Medicine for further management.      Overview/Hospital Course:      49-year-old gentleman with history of ulcerative colitis and c diff who presented with high frequency diarrhea described as clear in color without blood.  He has been seen by GI and currently it is felt that this represents recurrent C diff. He has been  placed on Dificid beginning the evening of 1/2. He can be discharged on 1/4 if diarrhea continues to improve.  Outpatient GI office will continue to arrange his getting zinplava after discharge prior to end of dificid therapy.      While here, his C diff antigen was positive whereas it was previously negative during prior hospitalizations.  Toxin EIA was negative and PCR pending.     Regarding his ulcerative colitis, he has now received 2 doses of Tremfya as an outpatient.  Gastroenterology is not recommending changes to his ulcerative colitis regimen.      Wound care consulted for chronic wound.       Interval History:  Patient was seen and examined at bedside this morning.  He is not improving.  Continues to have upwards of 12 episodes of diarrhea daily.  Remains on fidaxomicin.  GI reconsulted, infectious disease consulted.    Review of Systems  Objective:     Vital Signs (Most Recent):  Temp: 98.2 °F (36.8 °C) (01/07/25 1152)  Pulse: 75 (01/07/25 1152)  Resp: 16 (01/07/25 1438)  BP: 116/63 (01/07/25 1152)  SpO2: 96 % (01/07/25 1152) Vital Signs (24h Range):  Temp:  [98 °F (36.7 °C)-98.7 °F (37.1 °C)] 98.2 °F (36.8 °C)  Pulse:  [67-77] 75  Resp:  [14-18] 16  SpO2:  [93 %-98 %] 96 %  BP: (103-117)/(56-81) 116/63     Weight: 132.9 kg (293 lb)  Body mass index is 48.76 kg/m².    Intake/Output Summary (Last 24 hours) at 1/7/2025 1538  Last data filed at 1/7/2025 1413  Gross per 24 hour   Intake 670 ml   Output --   Net 670 ml         Physical Exam      NAD, A/O 3   RRR  CTAB   Soft, nondistended, bowel sounds present, mild diffuse tenderness without rebound or guarding   No edema     Significant Labs: All pertinent labs within the past 24 hours have been reviewed.  CBC:   Recent Labs   Lab 01/06/25  0409 01/07/25  0523   WBC 8.89 9.49   HGB 9.4* 9.8*   HCT 31.0* 31.2*    267     CMP:   Recent Labs   Lab 01/06/25  0409 01/07/25  0523   * 137   K 3.7 4.0    102   CO2 27 25   GLU 96 103   BUN 11 9    CREATININE 1.2 1.0   CALCIUM 8.6* 8.4*   PROT 6.0 6.2   ALBUMIN 3.2* 3.2*   BILITOT 0.3 0.3   ALKPHOS 53* 55   AST 8* 9*   ALT 6* 6*   ANIONGAP 5* 10     Magnesium:   Recent Labs   Lab 01/06/25  0409 01/07/25  0523   MG 1.5* 1.5*       Significant Imaging: I have reviewed all pertinent imaging results/findings within the past 24 hours.  Imaging Results              X-Ray Chest AP Portable (Final result)  Result time 12/31/24 18:42:01      Final result by Mirta Gayle MD (12/31/24 18:42:01)                   Impression:      No acute findings.      Electronically signed by: Mirta Gayle  Date:    12/31/2024  Time:    18:42               Narrative:    EXAMINATION:  XR CHEST AP PORTABLE    CLINICAL HISTORY:  leukocytosis;    TECHNIQUE:  Single frontal view of the chest was performed.    COMPARISON:  11/27/2024    FINDINGS:  Lungs are clear. No focal consolidation. No pleural effusion. No pneumothorax. Normal heart size.                                       CT Abdomen Pelvis With IV Contrast NO Oral Contrast (Final result)  Result time 12/31/24 15:14:55      Final result by Samuel Boyle MD (12/31/24 15:14:55)                   Impression:      1. CT findings compatible with acute colitis as above, slightly worsened when compared to December 22, 2024.  No evidence of perforation or pneumatosis.  2. Additional stable findings as above.      Electronically signed by: Samuel Boyle  Date:    12/31/2024  Time:    15:14               Narrative:    EXAMINATION:  CT ABDOMEN PELVIS WITH IV CONTRAST    CLINICAL HISTORY:  Abdominal pain, acute, nonlocalized;.    TECHNIQUE:  Post infusion axial images were obtained from the lung bases to the pubic symphysis 100 cc nonionic contrast was utilized for the examination.    COMPARISON:  December 22, 2024    FINDINGS:  The lung bases are unremarkable.    No hepatic mass or contour abnormality is identified.  The gallbladder is absent.  The biliary tree is  nondilated.  The spleen, pancreas, and adrenal glands are normal.  Bilateral nonobstructing renal calculi are similar to the prior study, the largest of which is at the lower pole on the left measuring 7 mm.  There are no ureteral calculi and there is no hydronephrosis.  The abdominal aorta is normal in caliber.    Assessment of bowel structures reveals persistent mild abnormal circumferential wall thickening of the colon from the proximal transverse colon to the rectum.  Compared to the prior study, the degree of wall thickening has increased slightly, with slightly increasing pericolonic edema, most notably at the level of the sigmoid colon.  Findings are compatible with acute colitis.  There is no evidence of perforation, pneumatosis, or obstruction.  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    Images of the pelvis demonstrate a normal urinary bladder.  There is no drainable free fluid or lymphadenopathy.    No acute osseous abnormalities are identified.                                        Assessment and Plan     * Colitis  C diff antigen positive, toxin negative, PCR positive   Continue fidaxomicin   Not much improvement, continues to have upwards of 12 episodes of diarrhea daily  GI reconsulted  Infectious Disease consulted  Labs unremarkable, afebrile, hemodynamically stable    Leukocytosis    WBC normal   Hemodynamically stable   Afebrile      Gastroparesis  Continue symptom support    DVT (deep venous thrombosis)  Continue Xarelto    Open wound of left upper arm  Wound care following      Ulcerative colitis    The patient is on outpatient tremfya    BMI 45.0-49.9, adult  Body mass index is 48.76 kg/m². Morbid obesity complicates all aspects of disease management from diagnostic modalities to treatment. Weight loss encouraged and health benefits explained to patient.         History of supraventricular tachycardia  continue metoprolol.        VTE Risk Mitigation (From  admission, onward)           Ordered     rivaroxaban tablet 20 mg  With dinner         01/03/25 1445     Reason for No Pharmacological VTE Prophylaxis  Once        Question:  Reasons:  Answer:  Active Bleeding    12/31/24 1801     IP VTE HIGH RISK PATIENT  Once         12/31/24 1801     Place sequential compression device  Until discontinued         12/31/24 1801                    Discharge Planning   GERARDO: 1/9/2025     Code Status: Full Code   Medical Readiness for Discharge Date:   Discharge Plan A: Home                        Adrian Estrada DO  Department of Hospital Medicine   [facility]

## 2025-01-07 NOTE — SUBJECTIVE & OBJECTIVE
Interval History:  Patient was seen and examined at bedside this morning.  He is not improving.  Continues to have upwards of 12 episodes of diarrhea daily.  Remains on fidaxomicin.  GI reconsulted, infectious disease consulted.    Review of Systems  Objective:     Vital Signs (Most Recent):  Temp: 98.2 °F (36.8 °C) (01/07/25 1152)  Pulse: 75 (01/07/25 1152)  Resp: 16 (01/07/25 1438)  BP: 116/63 (01/07/25 1152)  SpO2: 96 % (01/07/25 1152) Vital Signs (24h Range):  Temp:  [98 °F (36.7 °C)-98.7 °F (37.1 °C)] 98.2 °F (36.8 °C)  Pulse:  [67-77] 75  Resp:  [14-18] 16  SpO2:  [93 %-98 %] 96 %  BP: (103-117)/(56-81) 116/63     Weight: 132.9 kg (293 lb)  Body mass index is 48.76 kg/m².    Intake/Output Summary (Last 24 hours) at 1/7/2025 1538  Last data filed at 1/7/2025 1413  Gross per 24 hour   Intake 670 ml   Output --   Net 670 ml         Physical Exam      NAD, A/O 3   RRR  CTAB   Soft, nondistended, bowel sounds present, mild diffuse tenderness without rebound or guarding   No edema     Significant Labs: All pertinent labs within the past 24 hours have been reviewed.  CBC:   Recent Labs   Lab 01/06/25  0409 01/07/25  0523   WBC 8.89 9.49   HGB 9.4* 9.8*   HCT 31.0* 31.2*    267     CMP:   Recent Labs   Lab 01/06/25  0409 01/07/25  0523   * 137   K 3.7 4.0    102   CO2 27 25   GLU 96 103   BUN 11 9   CREATININE 1.2 1.0   CALCIUM 8.6* 8.4*   PROT 6.0 6.2   ALBUMIN 3.2* 3.2*   BILITOT 0.3 0.3   ALKPHOS 53* 55   AST 8* 9*   ALT 6* 6*   ANIONGAP 5* 10     Magnesium:   Recent Labs   Lab 01/06/25  0409 01/07/25  0523   MG 1.5* 1.5*       Significant Imaging: I have reviewed all pertinent imaging results/findings within the past 24 hours.  Imaging Results              X-Ray Chest AP Portable (Final result)  Result time 12/31/24 18:42:01      Final result by Mirta Gayle MD (12/31/24 18:42:01)                   Impression:      No acute findings.      Electronically signed by: Mirta  Irwin  Date:    12/31/2024  Time:    18:42               Narrative:    EXAMINATION:  XR CHEST AP PORTABLE    CLINICAL HISTORY:  leukocytosis;    TECHNIQUE:  Single frontal view of the chest was performed.    COMPARISON:  11/27/2024    FINDINGS:  Lungs are clear. No focal consolidation. No pleural effusion. No pneumothorax. Normal heart size.                                       CT Abdomen Pelvis With IV Contrast NO Oral Contrast (Final result)  Result time 12/31/24 15:14:55      Final result by Samuel Boyle MD (12/31/24 15:14:55)                   Impression:      1. CT findings compatible with acute colitis as above, slightly worsened when compared to December 22, 2024.  No evidence of perforation or pneumatosis.  2. Additional stable findings as above.      Electronically signed by: Samuel Boyle  Date:    12/31/2024  Time:    15:14               Narrative:    EXAMINATION:  CT ABDOMEN PELVIS WITH IV CONTRAST    CLINICAL HISTORY:  Abdominal pain, acute, nonlocalized;.    TECHNIQUE:  Post infusion axial images were obtained from the lung bases to the pubic symphysis 100 cc nonionic contrast was utilized for the examination.    COMPARISON:  December 22, 2024    FINDINGS:  The lung bases are unremarkable.    No hepatic mass or contour abnormality is identified.  The gallbladder is absent.  The biliary tree is nondilated.  The spleen, pancreas, and adrenal glands are normal.  Bilateral nonobstructing renal calculi are similar to the prior study, the largest of which is at the lower pole on the left measuring 7 mm.  There are no ureteral calculi and there is no hydronephrosis.  The abdominal aorta is normal in caliber.    Assessment of bowel structures reveals persistent mild abnormal circumferential wall thickening of the colon from the proximal transverse colon to the rectum.  Compared to the prior study, the degree of wall thickening has increased slightly, with slightly increasing pericolonic edema,  most notably at the level of the sigmoid colon.  Findings are compatible with acute colitis.  There is no evidence of perforation, pneumatosis, or obstruction.  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    Images of the pelvis demonstrate a normal urinary bladder.  There is no drainable free fluid or lymphadenopathy.    No acute osseous abnormalities are identified.

## 2025-01-08 ENCOUNTER — TELEPHONE (OUTPATIENT)
Dept: FAMILY MEDICINE | Facility: CLINIC | Age: 50
End: 2025-01-08
Payer: COMMERCIAL

## 2025-01-08 LAB
ALBUMIN SERPL BCP-MCNC: 3.1 G/DL (ref 3.5–5.2)
ALP SERPL-CCNC: 52 U/L (ref 55–135)
ALT SERPL W/O P-5'-P-CCNC: 6 U/L (ref 10–44)
ANION GAP SERPL CALC-SCNC: 6 MMOL/L (ref 8–16)
AST SERPL-CCNC: 7 U/L (ref 10–40)
BASOPHILS # BLD AUTO: 0.05 K/UL (ref 0–0.2)
BASOPHILS NFR BLD: 0.6 % (ref 0–1.9)
BILIRUB SERPL-MCNC: 0.3 MG/DL (ref 0.1–1)
BUN SERPL-MCNC: 7 MG/DL (ref 6–20)
CALCIUM SERPL-MCNC: 8.3 MG/DL (ref 8.7–10.5)
CHLORIDE SERPL-SCNC: 106 MMOL/L (ref 95–110)
CO2 SERPL-SCNC: 26 MMOL/L (ref 23–29)
CREAT SERPL-MCNC: 1.1 MG/DL (ref 0.5–1.4)
DIFFERENTIAL METHOD BLD: ABNORMAL
EOSINOPHIL # BLD AUTO: 0.5 K/UL (ref 0–0.5)
EOSINOPHIL NFR BLD: 5.9 % (ref 0–8)
ERYTHROCYTE [DISTWIDTH] IN BLOOD BY AUTOMATED COUNT: 16.8 % (ref 11.5–14.5)
EST. GFR  (NO RACE VARIABLE): >60 ML/MIN/1.73 M^2
GLUCOSE SERPL-MCNC: 93 MG/DL (ref 70–110)
HCT VFR BLD AUTO: 31.1 % (ref 40–54)
HGB BLD-MCNC: 9.5 G/DL (ref 14–18)
IMM GRANULOCYTES # BLD AUTO: 0.04 K/UL (ref 0–0.04)
IMM GRANULOCYTES NFR BLD AUTO: 0.5 % (ref 0–0.5)
LYMPHOCYTES # BLD AUTO: 1.1 K/UL (ref 1–4.8)
LYMPHOCYTES NFR BLD: 12.8 % (ref 18–48)
MAGNESIUM SERPL-MCNC: 1.7 MG/DL (ref 1.6–2.6)
MCH RBC QN AUTO: 26.4 PG (ref 27–31)
MCHC RBC AUTO-ENTMCNC: 30.5 G/DL (ref 32–36)
MCV RBC AUTO: 86 FL (ref 82–98)
MONOCYTES # BLD AUTO: 1.3 K/UL (ref 0.3–1)
MONOCYTES NFR BLD: 15.1 % (ref 4–15)
NEUTROPHILS # BLD AUTO: 5.7 K/UL (ref 1.8–7.7)
NEUTROPHILS NFR BLD: 65.1 % (ref 38–73)
NRBC BLD-RTO: 0 /100 WBC
PLATELET # BLD AUTO: 263 K/UL (ref 150–450)
PMV BLD AUTO: 9.8 FL (ref 9.2–12.9)
POTASSIUM SERPL-SCNC: 3.9 MMOL/L (ref 3.5–5.1)
PREALB SERPL-MCNC: 9.6 MG/DL (ref 20–43)
PROT SERPL-MCNC: 6 G/DL (ref 6–8.4)
RBC # BLD AUTO: 3.6 M/UL (ref 4.6–6.2)
SODIUM SERPL-SCNC: 138 MMOL/L (ref 136–145)
WBC # BLD AUTO: 8.74 K/UL (ref 3.9–12.7)

## 2025-01-08 PROCEDURE — 12000002 HC ACUTE/MED SURGE SEMI-PRIVATE ROOM

## 2025-01-08 PROCEDURE — 25000003 PHARM REV CODE 250: Performed by: INTERNAL MEDICINE

## 2025-01-08 PROCEDURE — 27000207 HC ISOLATION

## 2025-01-08 PROCEDURE — 25000003 PHARM REV CODE 250: Performed by: HOSPITALIST

## 2025-01-08 PROCEDURE — 83735 ASSAY OF MAGNESIUM: CPT | Performed by: HOSPITALIST

## 2025-01-08 PROCEDURE — 80053 COMPREHEN METABOLIC PANEL: CPT | Performed by: HOSPITALIST

## 2025-01-08 PROCEDURE — 25000003 PHARM REV CODE 250

## 2025-01-08 PROCEDURE — 36415 COLL VENOUS BLD VENIPUNCTURE: CPT | Performed by: HOSPITALIST

## 2025-01-08 PROCEDURE — 99233 SBSQ HOSP IP/OBS HIGH 50: CPT | Mod: ,,, | Performed by: STUDENT IN AN ORGANIZED HEALTH CARE EDUCATION/TRAINING PROGRAM

## 2025-01-08 PROCEDURE — 25000003 PHARM REV CODE 250: Performed by: STUDENT IN AN ORGANIZED HEALTH CARE EDUCATION/TRAINING PROGRAM

## 2025-01-08 PROCEDURE — 83993 ASSAY FOR CALPROTECTIN FECAL: CPT | Performed by: INTERNAL MEDICINE

## 2025-01-08 PROCEDURE — 63600175 PHARM REV CODE 636 W HCPCS: Performed by: INTERNAL MEDICINE

## 2025-01-08 PROCEDURE — 36415 COLL VENOUS BLD VENIPUNCTURE: CPT | Performed by: SURGERY

## 2025-01-08 PROCEDURE — 85025 COMPLETE CBC W/AUTO DIFF WBC: CPT | Performed by: HOSPITALIST

## 2025-01-08 PROCEDURE — 84134 ASSAY OF PREALBUMIN: CPT | Performed by: SURGERY

## 2025-01-08 PROCEDURE — 99223 1ST HOSP IP/OBS HIGH 75: CPT | Mod: ,,, | Performed by: SURGERY

## 2025-01-08 RX ORDER — DIPHENOXYLATE HYDROCHLORIDE AND ATROPINE SULFATE 2.5; .025 MG/1; MG/1
1 TABLET ORAL 4 TIMES DAILY PRN
Status: DISCONTINUED | OUTPATIENT
Start: 2025-01-08 | End: 2025-01-09 | Stop reason: HOSPADM

## 2025-01-08 RX ADMIN — BUSPIRONE HYDROCHLORIDE 15 MG: 5 TABLET ORAL at 08:01

## 2025-01-08 RX ADMIN — DIPHENOXYLATE HYDROCHLORIDE AND ATROPINE SULFATE 1 TABLET: 2.5; .025 TABLET ORAL at 08:01

## 2025-01-08 RX ADMIN — DIPHENHYDRAMINE HYDROCHLORIDE 25 MG: 25 CAPSULE ORAL at 05:01

## 2025-01-08 RX ADMIN — MORPHINE SULFATE 2 MG: 2 INJECTION, SOLUTION INTRAMUSCULAR; INTRAVENOUS at 02:01

## 2025-01-08 RX ADMIN — HYOSCYAMINE SULFATE 0.25 MG: 0.12 TABLET SUBLINGUAL at 05:01

## 2025-01-08 RX ADMIN — DIPHENOXYLATE HYDROCHLORIDE AND ATROPINE SULFATE 1 TABLET: 2.5; .025 TABLET ORAL at 05:01

## 2025-01-08 RX ADMIN — HYOSCYAMINE SULFATE 0.25 MG: 0.12 TABLET SUBLINGUAL at 08:01

## 2025-01-08 RX ADMIN — HYDROCODONE BITARTRATE AND ACETAMINOPHEN 1 TABLET: 5; 325 TABLET ORAL at 08:01

## 2025-01-08 RX ADMIN — RIVAROXABAN 20 MG: 10 TABLET, FILM COATED ORAL at 05:01

## 2025-01-08 RX ADMIN — SODIUM CHLORIDE: 9 INJECTION, SOLUTION INTRAVENOUS at 05:01

## 2025-01-08 RX ADMIN — MAGNESIUM 64 MG (MAGNESIUM CHLORIDE) TABLET,DELAYED RELEASE 64 MG: at 08:01

## 2025-01-08 RX ADMIN — ONDANSETRON 4 MG: 2 INJECTION INTRAMUSCULAR; INTRAVENOUS at 04:01

## 2025-01-08 RX ADMIN — ZOLPIDEM TARTRATE 10 MG: 5 TABLET, COATED ORAL at 08:01

## 2025-01-08 RX ADMIN — HYDROCODONE BITARTRATE AND ACETAMINOPHEN 1 TABLET: 5; 325 TABLET ORAL at 05:01

## 2025-01-08 RX ADMIN — ONDANSETRON 4 MG: 2 INJECTION INTRAMUSCULAR; INTRAVENOUS at 02:01

## 2025-01-08 RX ADMIN — BUSPIRONE HYDROCHLORIDE 15 MG: 5 TABLET ORAL at 02:01

## 2025-01-08 RX ADMIN — SODIUM CHLORIDE: 9 INJECTION, SOLUTION INTRAVENOUS at 08:01

## 2025-01-08 RX ADMIN — Medication 6 MG: at 08:01

## 2025-01-08 RX ADMIN — MORPHINE SULFATE 2 MG: 2 INJECTION, SOLUTION INTRAMUSCULAR; INTRAVENOUS at 04:01

## 2025-01-08 RX ADMIN — DIPHENHYDRAMINE HYDROCHLORIDE 25 MG: 25 CAPSULE ORAL at 08:01

## 2025-01-08 RX ADMIN — METOPROLOL SUCCINATE 50 MG: 50 TABLET, FILM COATED, EXTENDED RELEASE ORAL at 08:01

## 2025-01-08 RX ADMIN — ESCITALOPRAM OXALATE 20 MG: 10 TABLET ORAL at 08:01

## 2025-01-08 RX ADMIN — MORPHINE SULFATE 2 MG: 2 INJECTION, SOLUTION INTRAMUSCULAR; INTRAVENOUS at 08:01

## 2025-01-08 RX ADMIN — FIDAXOMICIN 200 MG: 200 TABLET, FILM COATED ORAL at 08:01

## 2025-01-08 RX ADMIN — ATORVASTATIN CALCIUM 40 MG: 40 TABLET, FILM COATED ORAL at 08:01

## 2025-01-08 RX ADMIN — FAMOTIDINE 20 MG: 20 TABLET ORAL at 08:01

## 2025-01-08 NOTE — PLAN OF CARE
Fidaxomicin approved with $25 co-pay- returned to [facility] until DC.   Will need approval for cost transfer for co-pay if patient still needs at the time of discharge.

## 2025-01-08 NOTE — PROGRESS NOTES
[facility]   Department of Infectious Disease  Consult Note        PATIENT NAME: Jacoby Jean-Baptiste  MRN: [id number]  TODAY'S DATE: 01/08/2025  ADMIT DATE: 12/31/2024  LOS: 6 days    CHIEF COMPLAINT: Abdominal Pain (Reports history of UC ), Vomiting, and Diarrhea      PRINCIPLE PROBLEM: Colitis    REASON FOR CONSULT: Recurrent, un-improving C diff    INTERVAL HISTORY     1/8/25: Patient seen and examined at bedside. He still c/o diarrhea and abdominal cramps. He insists he wants to do surgery. Discussed with GI and Surgery and will discuss with patient further to think thorough out this decision.  Hemodynamically stable, stool output decreased today 4 times today so far.  Labs reviewed, white count 8.7, left shift 65.1%, H&H 7.5/31.1, platelet count 263.  Stable electrolytes, creatinine 1.1, AST 7/ALT 6.  We will discuss with GI and General surgery what would be the best course of action in view of patient's body habitus and nutrition status, prealbumin ordered.  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 twice a day will complete a 20 day course followed by 1 pill every other day until resolution  Special contact precautions  Incentive spirometry   Monitor stool output    Discussed with patient, nursing, Dr. Mohan/Surgery     Thank you for this consult. Please send [employer] secure chat with any questions.    Antibiotics (From admission, onward)      Start     Stop Route Frequency Ordered    01/02/25 2100  fidaxomicin tablet 200 mg         -- Oral 2 times daily 01/02/25 1418          Antifungals (From admission, onward)      None           Antivirals (From admission, onward)      None            HPI      Jacoby Jean-Baptiste is a 50 y.o. 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  Patient presented to the emergency room on new year's [place] for worsening diarrhea, as per patient symptoms started mid December he was going to the bathroom 10-12 times a day, some episodes with bloody stool, associated nausea, abdominal cramps, no vomit.  He complains of chills, denies fevers or night sweats.  The patient also complaining of headache, no cough or chest pain or shortness of breath.  Of note, he has a healed skin soft tissue lesion on his left arm, sees wound care outpatient.      Labs on admission leukocytosis 13.5, left shift 77.6%, H&H 10.8/35, platelet count 322, stable electrolytes   CRP on admission 2.8   UA negative   GI PCR negative   Chest x-ray clear   CT abdomen/pelvis on 12/31 consistent with acute colitis slightly worsened when compared to December  22nd.  No evidence of perforation or pneumatosis.    Patient empirically started on ceftriaxone ciprofloxacin and metronidazole on 12/31, both x1 dose.    Hospital course complicated by C diff colitis, on fidaxomicin, today is day 6.    Antibiotic history:    Ceftriaxone 12/31 1 g x 1   Ciprofloxacin 12/31 400 mg IV x1   Metronidazole 12/31 500 mg IV x1    Microbiology:    GI PCR negative   Stool for C diff C diff antigen positive, toxin negative, C diff PCR positive    Social History  Marital Status: Single  Alcohol History:  reports current alcohol use.  Tobacco History:  reports that he has quit smoking. His smoking use included cigarettes. He started smoking about 32 years ago. He has a 30 pack-year smoking history. He has never used smokeless tobacco.  Drug History:  reports that he does not currently use drugs.  He is currently unemployed, awaiting for disability.  Used to work at a [employer] until March of 2024.    Review of patient's allergies indicates:  No Known Allergies  Past Medical History:   Diagnosis Date    C. difficile colitis     Cavitary pneumonia 11/2023    pos aspergillus serology    Diabetes mellitus 01/2022    Hyperlipidemia 2015    Hypertension 2015    Insomnia 2015    Ulcerative colitis      Past Surgical History:   Procedure Laterality Date    BRONCHOSCOPY WITH FLUOROSCOPY Left 11/20/2023    Procedure: BRONCHOSCOPY, WITH FLUOROSCOPY;  Surgeon: Lisa Villarreal MD;  Location: [facility];  Service: Pulmonary;  Laterality: Left;    BRONCHOSCOPY WITH FLUOROSCOPY N/A 11/30/2023    Procedure: BRONCHOSCOPY, WITH FLUOROSCOPY;  Surgeon: Lisa Villarreal MD;  Location: [facility];  Service: Pulmonary;  Laterality: N/A;    CARDIAC ELECTROPHYSIOLOGY MAPPING AND ABLATION  2017    SVT    CHOLECYSTECTOMY  2011    COLONOSCOPY N/A 5/3/2022    Procedure: COLONOSCOPY;  Surgeon: Shan Jean III, MD;  Location: [facility];  Service: Endoscopy;  Laterality: N/A;    COLONOSCOPY N/A 3/16/2024     Procedure: COLONOSCOPY;  Surgeon: ALEKSANDER Ramos MD;  Location: [facility];  Service: Endoscopy;  Laterality: N/A;    COLONOSCOPY WITH FECAL MICROBIOTA TRANSFER N/A 3/21/2023    Procedure: COLONOSCOPY, WITH FECAL MICROBIOTA TRANSFER;  Surgeon: David Millan MD;  Location: [facility];  Service: Endoscopy;  Laterality: N/A;    ESOPHAGOGASTRODUODENOSCOPY N/A 4/24/2023    Procedure: EGD (ESOPHAGOGASTRODUODENOSCOPY);  Surgeon: Shan Jean III, MD;  Location: [facility];  Service: Endoscopy;  Laterality: N/A;    ESOPHAGOGASTRODUODENOSCOPY N/A 6/5/2024    Procedure: EGD (ESOPHAGOGASTRODUODENOSCOPY);  Surgeon: Odalis Mccabe MD;  Location: [facility];  Service: Endoscopy;  Laterality: N/A;    FLEXIBLE SIGMOIDOSCOPY N/A 6/5/2024    Procedure: SIGMOIDOSCOPY, FLEXIBLE;  Surgeon: Odalis Mccabe MD;  Location: [facility];  Service: Endoscopy;  Laterality: N/A;    FLEXIBLE SIGMOIDOSCOPY N/A 7/16/2024    Procedure: SIGMOIDOSCOPY, FLEXIBLE;  Surgeon: Shan Jean III, MD;  Location: [facility];  Service: Endoscopy;  Laterality: N/A;    FLEXIBLE SIGMOIDOSCOPY N/A 8/13/2024    Procedure: SIGMOIDOSCOPY, FLEXIBLE;  Surgeon: Shan Jean III, MD;  Location: [facility];  Service: Endoscopy;  Laterality: N/A;    GANGLION CYST EXCISION Left 1992    UMBILICAL HERNIA REPAIR  2011    with mesh     Family History   Problem Relation Name Age of Onset    Asthma Mother         SUBJECTIVE     Review of systems  Twelve point review of systems obtained and negative except as stated above in Interval History     OBJECTIVE   Temp:  [98.2 °F (36.8 °C)-98.7 °F (37.1 °C)] 98.4 °F (36.9 °C)  Pulse:  [64-76] 68  Resp:  [16-18] 16  SpO2:  [96 %-98 %] 97 %  BP: (107-132)/(59-76) 108/59  Temp:  [98.2 °F (36.8 °C)-98.7 °F (37.1 °C)]   Temp: 98.4 °F (36.9 °C) (01/08/25 1120)  Pulse: 68 (01/08/25 1120)  Resp: 16 (01/08/25 1120)  BP: (!) 108/59 (01/08/25 1120)  SpO2: 97 % (01/08/25 1120)    Intake/Output Summary (Last 24 hours)  at 1/8/2025 1409  Last data filed at 1/8/2025 1322  Gross per 24 hour   Intake 1420 ml   Output --   Net 1420 ml       Physical Exam  General:  Morbidly obese  male lying in bed, breathing comfortable on room air  Eyes: Eyes with no icterus or injection. Vision grossly normal  Ears: Hearing grossly normal.  Nose: Nares patent  Mouth: Moist mucous membranes, dentition is terrible, very poor oral hygiene, severe decay. No ulcerations, erythema or exudates.  Neck: Supple, no tenderness to palpation.  Cardiovascular: Regular rate and rhythm, no murmurs, no edema.    Respiratory:  Clear to auscultation bilaterally, no tachypnea or increased work of breathing.  Gastrointestinal:  Soft and obese with active bowel sounds, no tenderness to palpation, no distention.  Genitourinary:  No suprapubic tenderness.  Musculoskeletal:  Moves all extremities with good strength.    Skin:  Warm and dry, resolving multiple scabs on left arm, patient reports he has been scratching, no active evidence of cellulitis  Neuro:   Oriented, conversant, follows commands.  Psych: Good mood, normal affect.   VAD: PIV  ISOLATION:  Special contact/C diff       Wounds:  1/2/25:       Significant Labs: All pertinent labs within the past 24 hours have been reviewed.    CBC LAST 7  Recent Labs   Lab 01/02/25  0444 01/03/25  0835 01/04/25  0701 01/05/25  0551 01/06/25  0409 01/07/25  0523 01/08/25  0513   WBC 9.40 8.94 10.42 8.81 8.89 9.49 8.74   RBC 3.88* 3.80* 3.87* 3.74* 3.58* 3.60* 3.60*   HGB 10.2* 9.9* 10.3* 10.1* 9.4* 9.8* 9.5*   HCT 35.0* 33.6* 33.2* 32.6* 31.0* 31.2* 31.1*   MCV 90 88 86 87 87 87 86   MCH 26.3* 26.1* 26.6* 27.0 26.3* 27.2 26.4*   MCHC 29.1* 29.5* 31.0* 31.0* 30.3* 31.4* 30.5*   RDW 17.3* 17.1* 17.1* 17.2* 16.9* 16.8* 16.8*    253 278 273 257 267 263   MPV 10.4 10.0 9.7 9.6 9.9 9.8 9.8   GRAN 66.9  6.3 69.8  6.3 74.6*  7.8* 64.3  5.7 64.4  5.7 68.8  6.5 65.1  5.7   LYMPH 16.0*  1.5 13.4*  1.2 9.1*  1.0  "13.8*  1.2 14.1*  1.3 11.3*  1.1 12.8*  1.1   MONO 12.4  1.2* 11.1  1.0 11.6  1.2* 14.9  1.3* 12.9  1.2* 13.0  1.2* 15.1*  1.3*   BASO 0.06 0.06 0.04 0.04 0.04 0.05 0.05   NRBC 0 0 0 0 0 0 0       CHEMISTRY LAST 7  Recent Labs   Lab 01/02/25  0444 01/03/25  0835 01/04/25  0701 01/05/25  0551 01/06/25  0409 01/07/25  0523 01/08/25  0513    136 137 138 135* 137 138   K 4.0 3.7 3.9 4.2 3.7 4.0 3.9    103 102 102 103 102 106   CO2 25 28 27 29 27 25 26   ANIONGAP 5* 5* 8 7* 5* 10 6*   BUN 9 7 7 8 11 9 7   CREATININE 1.2 1.1 1.1 1.2 1.2 1.0 1.1   GLU 78 85 105 94 96 103 93   CALCIUM 8.6* 8.9 8.8 8.9 8.6* 8.4* 8.3*   MG 1.7 1.6 1.5* 1.7 1.5* 1.5* 1.7   ALBUMIN 3.5 3.4* 3.4* 3.4* 3.2* 3.2* 3.1*   PROT 6.3 6.4 6.2 6.4 6.0 6.2 6.0   ALKPHOS 69 66 60 59 53* 55 52*   ALT 6* 6* 6* 6* 6* 6* 6*   AST 9* 8* 7* 9* 8* 9* 7*   BILITOT 0.4 0.4 0.3 0.4 0.3 0.3 0.3       Estimated Creatinine Clearance: 101.2 mL/min (based on SCr of 1.1 mg/dL).    INFLAMMATORY/PROCAL  LAST 7  No results for input(s): "PROCAL", "ESR", "CRP" in the last 168 hours.  No results found for: "ESR"  CRP   Date Value Ref Range Status   12/31/2024 2.80 (H) <1.00 mg/dL Final     Comment:     CRP-Normal Application expected values:   <1.0        mg/dL   Normal Range  1.0 - 5.0  mg/dL   Indicates mild inflammation  5.0 - 10.0 mg/dL   Indicates severe inflammation  >10.0        mg/dL   Represents serious processes and   frequently         indicates the presence of a bacterial   infection.      12/22/2024 2.10 (H) <1.00 mg/dL Final     Comment:     CRP-Normal Application expected values:   <1.0        mg/dL   Normal Range  1.0 - 5.0  mg/dL   Indicates mild inflammation  5.0 - 10.0 mg/dL   Indicates severe inflammation  >10.0        mg/dL   Represents serious processes and   frequently         indicates the presence of a bacterial   infection.      12/06/2024 1.90 (H) <1.00 mg/dL Final     Comment:     CRP-Normal Application expected values:   <1.0 "        mg/dL   Normal Range  1.0 - 5.0  mg/dL   Indicates mild inflammation  5.0 - 10.0 mg/dL   Indicates severe inflammation  >10.0        mg/dL   Represents serious processes and   frequently         indicates the presence of a bacterial   infection.      11/01/2024 2.90 (H) <1.00 mg/dL Final     Comment:     CRP-Normal Application expected values:   <1.0        mg/dL   Normal Range  1.0 - 5.0  mg/dL   Indicates mild inflammation  5.0 - 10.0 mg/dL   Indicates severe inflammation  >10.0        mg/dL   Represents serious processes and   frequently         indicates the presence of a bacterial   infection.      08/11/2024 4.20 (H) <1.00 mg/dL Final     Comment:     CRP-Normal Application expected values:   <1.0        mg/dL   Normal Range  1.0 - 5.0  mg/dL   Indicates mild inflammation  5.0 - 10.0 mg/dL   Indicates severe inflammation  >10.0        mg/dL   Represents serious processes and   frequently         indicates the presence of a bacterial   infection.      07/15/2024 1.80 (H) <1.00 mg/dL Final     Comment:     CRP-Normal Application expected values:   <1.0        mg/dL   Normal Range  1.0 - 5.0  mg/dL   Indicates mild inflammation  5.0 - 10.0 mg/dL   Indicates severe inflammation  >10.0        mg/dL   Represents serious processes and   frequently         indicates the presence of a bacterial   infection.      07/12/2024 1.30 (H) <1.00 mg/dL Final     Comment:     CRP-Normal Application expected values:   <1.0        mg/dL   Normal Range  1.0 - 5.0  mg/dL   Indicates mild inflammation  5.0 - 10.0 mg/dL   Indicates severe inflammation  >10.0        mg/dL   Represents serious processes and   frequently         indicates the presence of a bacterial   infection.          PRIOR  MICROBIOLOGY:    No results found for the last 90 days.        LAST 7 DAYS MICROBIOLOGY   Microbiology Results (last 7 days)       Procedure Component Value Units Date/Time    C Diff Toxin by PCR [[id number]]  (Abnormal) Collected: 01/04/25  1808    Order Status: Completed Updated: 01/06/25 1235     C. diff PCR Positive     Comment: critical result(s) called and verbal readback obtained from   Sariah Estrada RN 3wing by [facility] 01/06/2025 12:35         Narrative:         critical result(s) called and verbal readback obtained from   Sariah Estrada RN [facility] by [facility] 01/06/2025 12:35    Clostridium difficile EIA [[id number]]  (Abnormal) Collected: 01/04/25 1808    Order Status: Completed Updated: 01/06/25 1059     C. diff Antigen Positive     C difficile Toxins A+B, EIA Negative     Comment: Testing not recommended for children <24 months old.                 CURRENT/PREVIOUS VISIT EKG  Results for orders placed or performed during the hospital encounter of 12/22/24   EKG 12-lead    Collection Time: 12/22/24  8:17 AM   Result Value Ref Range    QRS Duration 74 ms    [facility] QTC Calculation 440 ms    Narrative    Test Reason : R11.10,    Vent. Rate :  87 BPM     Atrial Rate :  87 BPM     P-R Int : 146 ms          QRS Dur :  74 ms      QT Int : 366 ms       P-R-T Axes :  58  22  45 degrees    QTcB Int : 440 ms    Normal sinus rhythm  Normal ECG  Confirmed by Josh Mathis ([id number]) on 12/30/2024 6:02:46 PM    Referred By: AAAREFERRAL SELF           Confirmed By: Josh Mathis          Significant Imaging: I have reviewed all relevant and available imaging results/findings within the past 24 hours.      I spent a total of 52 minutes on the day of the visit.This includes face to face time and non-face to face time preparing to see the patient (eg, review of tests), obtaining and/or reviewing separately obtained history, documenting clinical information in the electronic or other health record, independently interpreting results and communicating results to the patient/family/caregiver, or care coordinator.    Ophelia Galindo MD  Date of Service: 01/08/2025      This note was created using [employer] voice recognition software that occasionally  misinterpreted phrases or words.

## 2025-01-08 NOTE — SUBJECTIVE & OBJECTIVE
Interval History:  Patient was seen and examined at bedside this morning.  He is not improving.  Continues to have upwards of 12 episodes of diarrhea daily.  Remains on fidaxomicin.  Evaluated by GI, id, General surgery.  Attempting to optimize nutritional status prior to possible surgery.    Review of Systems  Objective:     Vital Signs (Most Recent):  Temp: 98 °F (36.7 °C) (01/08/25 1615)  Pulse: 65 (01/08/25 1615)  Resp: 16 (01/08/25 1615)  BP: (!) 154/71 (01/08/25 1615)  SpO2: (!) 93 % (01/08/25 1615) Vital Signs (24h Range):  Temp:  [98 °F (36.7 °C)-98.7 °F (37.1 °C)] 98 °F (36.7 °C)  Pulse:  [64-76] 65  Resp:  [16-18] 16  SpO2:  [93 %-97 %] 93 %  BP: (107-154)/(59-76) 154/71     Weight: 132.9 kg (293 lb)  Body mass index is 48.76 kg/m².    Intake/Output Summary (Last 24 hours) at 1/8/2025 1644  Last data filed at 1/8/2025 1627  Gross per 24 hour   Intake 1240 ml   Output --   Net 1240 ml         Physical Exam      NAD, A/O 3   RRR  CTAB   Soft, nondistended, bowel sounds present, mild diffuse tenderness without rebound or guarding   No edema     Significant Labs: All pertinent labs within the past 24 hours have been reviewed.  CBC:   Recent Labs   Lab 01/07/25 0523 01/08/25 0513   WBC 9.49 8.74   HGB 9.8* 9.5*   HCT 31.2* 31.1*    263     CMP:   Recent Labs   Lab 01/07/25 0523 01/08/25 0513    138   K 4.0 3.9    106   CO2 25 26    93   BUN 9 7   CREATININE 1.0 1.1   CALCIUM 8.4* 8.3*   PROT 6.2 6.0   ALBUMIN 3.2* 3.1*   BILITOT 0.3 0.3   ALKPHOS 55 52*   AST 9* 7*   ALT 6* 6*   ANIONGAP 10 6*     Magnesium:   Recent Labs   Lab 01/07/25 0523 01/08/25  0513   MG 1.5* 1.7       Significant Imaging: I have reviewed all pertinent imaging results/findings within the past 24 hours.  Imaging Results              X-Ray Chest AP Portable (Final result)  Result time 12/31/24 18:42:01      Final result by Mirta Gayle MD (12/31/24 18:42:01)                   Impression:      No  acute findings.      Electronically signed by: Mirta Gayle  Date:    12/31/2024  Time:    18:42               Narrative:    EXAMINATION:  XR CHEST AP PORTABLE    CLINICAL HISTORY:  leukocytosis;    TECHNIQUE:  Single frontal view of the chest was performed.    COMPARISON:  11/27/2024    FINDINGS:  Lungs are clear. No focal consolidation. No pleural effusion. No pneumothorax. Normal heart size.                                       CT Abdomen Pelvis With IV Contrast NO Oral Contrast (Final result)  Result time 12/31/24 15:14:55      Final result by Samuel Boyle MD (12/31/24 15:14:55)                   Impression:      1. CT findings compatible with acute colitis as above, slightly worsened when compared to December 22, 2024.  No evidence of perforation or pneumatosis.  2. Additional stable findings as above.      Electronically signed by: Samuel Boyle  Date:    12/31/2024  Time:    15:14               Narrative:    EXAMINATION:  CT ABDOMEN PELVIS WITH IV CONTRAST    CLINICAL HISTORY:  Abdominal pain, acute, nonlocalized;.    TECHNIQUE:  Post infusion axial images were obtained from the lung bases to the pubic symphysis 100 cc nonionic contrast was utilized for the examination.    COMPARISON:  December 22, 2024    FINDINGS:  The lung bases are unremarkable.    No hepatic mass or contour abnormality is identified.  The gallbladder is absent.  The biliary tree is nondilated.  The spleen, pancreas, and adrenal glands are normal.  Bilateral nonobstructing renal calculi are similar to the prior study, the largest of which is at the lower pole on the left measuring 7 mm.  There are no ureteral calculi and there is no hydronephrosis.  The abdominal aorta is normal in caliber.    Assessment of bowel structures reveals persistent mild abnormal circumferential wall thickening of the colon from the proximal transverse colon to the rectum.  Compared to the prior study, the degree of wall thickening has  increased slightly, with slightly increasing pericolonic edema, most notably at the level of the sigmoid colon.  Findings are compatible with acute colitis.  There is no evidence of perforation, pneumatosis, or obstruction.  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    Images of the pelvis demonstrate a normal urinary bladder.  There is no drainable free fluid or lymphadenopathy.    No acute osseous abnormalities are identified.

## 2025-01-08 NOTE — HPI
51 yo M with longstanding history of UC currently admitted with c diff in setting of UC.  PT notes that he has required hospital admission multiple times over the last several months given flares of his UC and c diff.  There has been evidence of improvement in degree of his colitis endoscopically however he notes significant decline in his QOL from the colitis.  He notes he constantly struggles with diarrhea and poor control which has negatively impacted his QOL.  He notes he is at a point where he now would consider colectomy.  He has PMHx significant for Obesity and UC.  PShx significant for UH with mesh

## 2025-01-08 NOTE — PROGRESS NOTES
D/w Michael Jean and Anastacio.   Pt with no significant urgent/emergent criteria for proceeding with inpt surgical TAC.  Pt reportedly requesting surgery prior to d/c  I am concerned about his medical deconditioning at present.  Prior to proceeding with a major surgery such as a total abdominal colectomy with end ileostomy it would be best for him to be medically optimized and have optimal nutrition.  He has been in the hospital for the last few weeks and I doubt has reach those criteria.  We will check a pre-albumin.  If his pre-albumin is normal would be willing to consider surgery this hospitalization.  If low would recommend that he be medically optimized prior to proceeding with major intra-abdominal surgical resection particularly in light of no significant medical abnormality warranting emergent or urgent surgical intervention

## 2025-01-08 NOTE — PROGRESS NOTES
Chief complaint:  Abdominal Pain (Reports history of UC ), Vomiting, and Diarrhea      HPI:  Jacoby Jean-Baptiste is a 49 y.o. male presenting with Open surgical wound of the left arm that was POA. Pt is known to me from clinic, and the wound has been there for a few months. No other comkplaints today    1/7/25  Pt seen at bedside today for a FU visit for a left arm open wound. Wound continues to improve, no new complaints today    PMH:  As per HPI and below:  Past Medical History:   Diagnosis Date    C. difficile colitis     Cavitary pneumonia 11/2023    pos aspergillus serology    Diabetes mellitus 01/2022    Hyperlipidemia 2015    Hypertension 2015    Insomnia 2015    Ulcerative colitis        Social History     Socioeconomic History    Marital status: Single   Tobacco Use    Smoking status: Former     Average packs/day: 1 pack/day for 30.0 years (30.0 ttl pk-yrs)     Types: Cigarettes     Start date: 1993    Smokeless tobacco: Never    Tobacco comments:     Pt states he has stopped smoking with Chantix three weeks ago. 12/1/23   Substance and Sexual Activity    Alcohol use: Yes     Comment: ocass    Drug use: Not Currently    Sexual activity: Not Currently     Social Drivers of Health     Financial Resource Strain: Low Risk  (1/1/2025)    Overall Financial Resource Strain (CARDIA)     Difficulty of Paying Living Expenses: Not very hard   Food Insecurity: No Food Insecurity (1/1/2025)    Hunger Vital Sign     Worried About Running Out of Food in the Last Year: Never true     Ran Out of Food in the Last Year: Never true   Transportation Needs: No Transportation Needs (1/1/2025)    TRANSPORTATION NEEDS     Transportation : No   Physical Activity: Inactive (12/27/2024)    Exercise Vital Sign     Days of Exercise per Week: 0 days     Minutes of Exercise per Session: 0 min   Stress: Stress Concern Present (1/1/2025)    Citizen of [place] of Occupational [employer] - Occupational Stress Questionnaire     Feeling of Stress : To  some extent   Housing Stability: Low Risk  (1/1/2025)    Housing Stability Vital Sign     Unable to Pay for Housing in the Last Year: No     Homeless in the Last Year: No       Past Surgical History:   Procedure Laterality Date    BRONCHOSCOPY WITH FLUOROSCOPY Left 11/20/2023    Procedure: BRONCHOSCOPY, WITH FLUOROSCOPY;  Surgeon: Lias Villarreal MD;  Location: [facility];  Service: Pulmonary;  Laterality: Left;    BRONCHOSCOPY WITH FLUOROSCOPY N/A 11/30/2023    Procedure: BRONCHOSCOPY, WITH FLUOROSCOPY;  Surgeon: Lisa Villarreal MD;  Location: [facility];  Service: Pulmonary;  Laterality: N/A;    CARDIAC ELECTROPHYSIOLOGY MAPPING AND ABLATION  2017    SVT    CHOLECYSTECTOMY  2011    COLONOSCOPY N/A 5/3/2022    Procedure: COLONOSCOPY;  Surgeon: Shan Jean III, MD;  Location: [facility];  Service: Endoscopy;  Laterality: N/A;    COLONOSCOPY N/A 3/16/2024    Procedure: COLONOSCOPY;  Surgeon: ALEKSANDER Ramos MD;  Location: [facility];  Service: Endoscopy;  Laterality: N/A;    COLONOSCOPY WITH FECAL MICROBIOTA TRANSFER N/A 3/21/2023    Procedure: COLONOSCOPY, WITH FECAL MICROBIOTA TRANSFER;  Surgeon: David Millan MD;  Location: [facility];  Service: Endoscopy;  Laterality: N/A;    ESOPHAGOGASTRODUODENOSCOPY N/A 4/24/2023    Procedure: EGD (ESOPHAGOGASTRODUODENOSCOPY);  Surgeon: Shan Jean III, MD;  Location: [facility];  Service: Endoscopy;  Laterality: N/A;    ESOPHAGOGASTRODUODENOSCOPY N/A 6/5/2024    Procedure: EGD (ESOPHAGOGASTRODUODENOSCOPY);  Surgeon: Odalis Mccabe MD;  Location: [facility];  Service: Endoscopy;  Laterality: N/A;    FLEXIBLE SIGMOIDOSCOPY N/A 6/5/2024    Procedure: SIGMOIDOSCOPY, FLEXIBLE;  Surgeon: Odalis Mccabe MD;  Location: [facility];  Service: Endoscopy;  Laterality: N/A;    FLEXIBLE SIGMOIDOSCOPY N/A 7/16/2024    Procedure: SIGMOIDOSCOPY, FLEXIBLE;  Surgeon: Shan Jean III, MD;  Location: [facility];  Service: Endoscopy;  Laterality: N/A;     FLEXIBLE SIGMOIDOSCOPY N/A 8/13/2024    Procedure: SIGMOIDOSCOPY, FLEXIBLE;  Surgeon: Shan Jean III, MD;  Location: [facility];  Service: Endoscopy;  Laterality: N/A;    GANGLION CYST EXCISION Left 1992    UMBILICAL HERNIA REPAIR  2011    with mesh       Family History   Problem Relation Name Age of Onset    Asthma Mother         Review of patient's allergies indicates:  No Known Allergies    Current Facility-Administered Medications on File Prior to Encounter   Medication Dose Route Frequency Provider Last Rate Last Admin    lactated ringers infusion   Intravenous Continuous David Millan MD 75 mL/hr at 03/21/23 1252 New Bag at 03/21/23 1252     Current Outpatient Medications on File Prior to Encounter   Medication Sig Dispense Refill    budesonide (ENTOCORT EC) 3 mg capsule Take 3 capsules (9 mg total) by mouth once daily. 180 each 0    busPIRone (BUSPAR) 15 MG tablet Take 1 tablet (15 mg total) by mouth 3 (three) times daily. 90 tablet 5    colchicine (COLCRYS) 0.6 mg tablet Take 1 tablet (0.6 mg total) by mouth 2 (two) times daily. 180 tablet 0    EScitalopram oxalate (LEXAPRO) 20 MG tablet Take 1 tablet (20 mg total) by mouth once daily. 90 tablet 0    HYDROcodone-acetaminophen (NORCO) 7.5-325 mg per tablet Take 1 tablet by mouth every 6 (six) hours as needed for Pain. 20 tablet 0    hyoscyamine (LEVBID) 0.375 mg Tb12 Take 0.375 mg by mouth 2 (two) times daily.      LORazepam (ATIVAN) 0.5 MG tablet Take 1 tablet (0.5 mg total) by mouth every 6 (six) hours as needed for Anxiety. 90 tablet 5    magnesium oxide (MAG-OX) 400 mg (241.3 mg magnesium) tablet Take 1 tablet (400 mg total) by mouth 2 (two) times daily. 28 tablet 0    metoclopramide HCl (REGLAN) 5 MG tablet Take 1 tablet (5 mg total) by mouth every 8 (eight) hours as needed (take as needed before meals). 90 tablet 0    metoprolol succinate (TOPROL-XL) 50 MG 24 hr tablet Take 1 tablet (50 mg total) by mouth once daily. 90 tablet 1     "pantoprazole (PROTONIX) 40 MG tablet Take 40 mg by mouth 2 (two) times daily.      promethazine (PHENERGAN) 25 MG tablet Take 25 mg by mouth 4 (four) times daily as needed for Nausea.      rivaroxaban (XARELTO) 20 mg Tab Take 1 tablet (20 mg total) by mouth daily with dinner or evening meal. 90 tablet 0    simvastatin (ZOCOR) 20 MG tablet Take 1 tablet (20 mg total) by mouth every evening. 90 tablet 1    zolpidem (AMBIEN) 10 mg Tab Take 1 tablet (10 mg total) by mouth nightly as needed (insomnia). 30 tablet 2       ROS: As per HPI and below:  Pertinent items are noted in HPI.      Physical Exam:     Vitals:    25 1438 25 1634 25 1812 25 1920   BP:  128/71  127/76   Patient Position:    Lying   Pulse:  64  76   Resp: 16  18 16   Temp:  98.2 °F (36.8 °C)  98.6 °F (37 °C)   TempSrc:  Oral  Oral   SpO2:  98%  96%   Weight:       Height:           BP  Min: 97/53  Max: 161/88  Temp  Av.5 °F (36.9 °C)  Min: 98 °F (36.7 °C)  Max: 99.6 °F (37.6 °C)  Pulse  Av.4  Min: 64  Max: 89  Resp  Av.4  Min: 10  Max: 20  SpO2  Av %  Min: 92 %  Max: 100 %  Height  Av' 5" (165.1 cm)  Min: 5' 5" (165.1 cm)  Max: 5' 5" (165.1 cm)  Weight  Av.9 kg (293 lb)  Min: 132.9 kg (293 lb)  Max: 132.9 kg (293 lb)    Body mass index is 48.76 kg/m².          General:             Well developed, well nourished, no apparent distress  HEENT:              NCAT, no JVD, mucous membranes moist, EOM intact  Cardiovascular:  Regular rate and rhythm, normal S1, normal S2, No murmurs, rubs, or gallops  Respiratory:        Normal breath sounds, no wheezes, no rales, no rhonchi  Abdomen:           Bowel sounds present, non tender, non distended, no masses, no hepatojugular reflux  Extremities:        No clubbing, no cyanosis, no edema  Vascular:            2+ b/l radial.  Peripheral pulses intact.  No carotid bruits.  Neurological:      No focal deficits  Skin:                   No obvious rashes or erythema, " Open surgical wound of the left arm      Lab Results   Component Value Date    WBC 9.49 01/07/2025    HGB 9.8 (L) 01/07/2025    HCT 31.2 (L) 01/07/2025    MCV 87 01/07/2025     01/07/2025     Lab Results   Component Value Date    CHOL 162 11/01/2024    CHOL 128 09/25/2024    CHOL 142 10/27/2023     Lab Results   Component Value Date    HDL 48 11/01/2024    HDL 69 09/25/2024    HDL 44 10/27/2023     Lab Results   Component Value Date    LDLCALC 88.4 11/01/2024    LDLCALC 33.0 (L) 09/25/2024    LDLCALC 81.4 10/27/2023     Lab Results   Component Value Date    TRIG 128 11/01/2024    TRIG 130 09/25/2024    TRIG 83 10/27/2023     Lab Results   Component Value Date    CHOLHDL 29.6 11/01/2024    CHOLHDL 53.9 (H) 09/25/2024    CHOLHDL 31.0 10/27/2023     CMP  Recent Labs   Lab 01/07/25  0523      CALCIUM 8.4*   ALBUMIN 3.2*   PROT 6.2      K 4.0   CO2 25      BUN 9   CREATININE 1.0   ALKPHOS 55   ALT 6*   AST 9*   BILITOT 0.3      Lab Results   Component Value Date    TSH 0.515 11/01/2024             Assessment and Recommendations     Diagnoses:    Open surgical wound of the left arm    Plan:  Continue Hydrofera blue to the left arm open wound daily    Complexity:    moderate     (2) probably inadequate

## 2025-01-08 NOTE — PROGRESS NOTES
Patient Name: Jacoby Jean-Baptiste  Patient MRN: [id number]  Patient : 1975    Admit Date: 2024  Service date: 2025    Reason for Consult: diarrhea / C. Diff / UC    PCP: Hunter Aguilar III, MD    S: Still having diarrhea / cramping. Jose PO    Patient still reports diarrhea but has not received lomotil here    Inpatient Medications:   atorvastatin  40 mg Oral QHS    busPIRone  15 mg Oral TID    EScitalopram oxalate  20 mg Oral Daily    famotidine  20 mg Oral BID    fidaxomicin  200 mg Oral BID    hyoscyamine  0.25 mg Sublingual Q8H    magnesium chloride  64 mg Oral Daily    metoprolol succinate  50 mg Oral Daily    rivaroxaban  20 mg Oral Daily with dinner       Current Facility-Administered Medications:     acetaminophen, 650 mg, Oral, Q8H PRN    acetaminophen, 650 mg, Oral, Q4H PRN    aluminum-magnesium hydroxide-simethicone, 30 mL, Oral, QID PRN    dextrose 50%, 12.5 g, Intravenous, PRN    dextrose 50%, 25 g, Intravenous, PRN    diphenhydrAMINE, 25 mg, Oral, Q6H PRN    diphenoxylate-atropine 2.5-0.025 mg, 1 tablet, Oral, QID PRN    glucagon (human recombinant), 1 mg, Intramuscular, PRN    glucose, 16 g, Oral, PRN    glucose, 24 g, Oral, PRN    HYDROcodone-acetaminophen, 1 tablet, Oral, Q6H PRN    melatonin, 6 mg, Oral, Nightly PRN    metoclopramide HCl, 5 mg, Oral, TID AC PRN    morphine, 2 mg, Intravenous, Q6H PRN    naloxone, 0.02 mg, Intravenous, PRN    ondansetron, 4 mg, Intravenous, Q6H PRN    potassium bicarbonate, 35 mEq, Oral, PRN    potassium bicarbonate, 50 mEq, Oral, PRN    potassium bicarbonate, 60 mEq, Oral, PRN    potassium, sodium phosphates, 2 packet, Oral, PRN    potassium, sodium phosphates, 2 packet, Oral, PRN    potassium, sodium phosphates, 2 packet, Oral, PRN    sodium chloride 0.9%, 2 mL, Intravenous, Q12H PRN    zolpidem, 10 mg, Oral, Nightly PRN    Review of Systems:  A 10 point review of systems was performed and was normal, except as mentioned in the HPI, including  "constitutional, HEENT, heme, lymph, cardiovascular, respiratory, gastrointestinal, genitourinary, neurologic, endocrine, psychiatric and musculoskeletal.      OBJECTIVE:    Physical Exam:  24 Hour Vital Sign Ranges: Temp:  [98 °F (36.7 °C)-98.7 °F (37.1 °C)] 98 °F (36.7 °C)  Pulse:  [64-76] 65  Resp:  [16-18] 16  SpO2:  [93 %-97 %] 93 %  BP: (107-154)/(59-76) 154/71  Most recent vitals: BP (!) 154/71 (BP Location: Left arm, Patient Position: Lying)   Pulse 65   Temp 98 °F (36.7 °C) (Oral)   Resp 16   Ht 5' 5" (1.651 m)   Wt 132.9 kg (293 lb)   SpO2 (!) 93%   BMI 48.76 kg/m²    GEN: well-developed, well-nourished, awake and alert, non-toxic appearing adult  HEENT: PERRL, sclera anicteric, oral mucosa pink and moist without lesion  NECK: trachea midline; Good ROM  CV: regular rate and rhythm, no murmurs or gallops  RESP: clear to auscultation bilaterally, no wheezes, rhonci or rales  ABD: soft, non-tender, non-distended, normal bowel sounds  EXT: no swelling or edema, 2+ pulses distally  SKIN: no rashes or jaundice  PSYCH: normal affect    Labs:   Recent Labs     01/06/25  0409 01/07/25  0523 01/08/25  0513   WBC 8.89 9.49 8.74   MCV 87 87 86    267 263     Recent Labs     01/06/25  0409 01/07/25  0523 01/08/25  0513   * 137 138   K 3.7 4.0 3.9    102 106   CO2 27 25 26   BUN 11 9 7   GLU 96 103 93     No results for input(s): "ALB" in the last 72 hours.    Invalid input(s): "ALKP", "SGOT", "SGPT", "TBIL", "DBIL", "TPRO"  No results for input(s): "PT", "INR", "PTT" in the last 72 hours.      Radiology Review:  X-Ray KUB   Final Result      Unremarkable abdominal radiograph.         Electronically signed by: Charles Baker   Date:    01/07/2025   Time:    16:08      X-Ray Chest AP Portable   Final Result      No acute findings.         Electronically signed by: Mirta Gayle   Date:    12/31/2024   Time:    18:42      CT Abdomen Pelvis With IV Contrast NO Oral Contrast   Final Result    "   1. CT findings compatible with acute colitis as above, slightly worsened when compared to December 22, 2024.  No evidence of perforation or pneumatosis.   2. Additional stable findings as above.         Electronically signed by: Samuel Boyle   Date:    12/31/2024   Time:    15:14            IMPRESSION / RECOMMENDATIONS:  48 y/o M w/ho HLD, DM on ozempic, HTN, LOLIS, umbilical hernia repair, tobacco use, C. diff s/p Vanco / dificid / FMT / rebyota, ulcerative colitis on Tremfaya (2doses), anemia presents for evaluation of diarrhea. C. Diff Ag+ but toxin negative however PCR was + and started on Dificid. Last calprotectin in 12/'24 had improved to 363 (previously >1000). Extensive discussions w/ patient / ID / colo-rectal. Some concerns that this is C. Diff more than UC sami given relatively acute onset loose stools. Patient is frustrated w/ his refractory disease and wants to discuss colectomy w/ surgery. Extensive discussions regarding risks / benefits / co-morbidities regarding the surgery vs medical Rx.     -D/c mag ox; start MagCl daily  -- schedule lomotil - discussed with patient and nursing staff  Repeat calprotectin  Would recommend discharge to get IV zinplava for cdiff component of diarrhea prior to surgical decision.  This was d/w patient        Odalis Mccabe  1/8/2025  4:46 PM

## 2025-01-08 NOTE — ASSESSMENT & PLAN NOTE
Difficult decision.  Per pt he is at a point where he consider medical therapy to have failed.  He indicates he would like to consider surgical resection.   In current situation surgery would certainly require Total abdominal colectomy with end ileostomy.  Ileostomy may be permanent.  Given body habitus placement of ileostomy could be technically difficult.    Would not consider reversal until considerable weight loss. He would still require rectal surveillance.    He expresses understanding and notes he feels he would like to puruse.  I have encouraged him to give further thought.  WIll d/w GI and ID.

## 2025-01-08 NOTE — CONSULTS
[facility]  General Surgery  Consult Note    Patient Name: Jacoby Jean-Baptiste  MRN: [id number]  Code Status: Full Code  Admission Date: 12/31/2024  Hospital Length of Stay: 6 days  Attending Physician: Adrian Estrada DO  Primary Care Provider: Hunter Aguilar III, MD    Patient information was obtained from patient and ER records.     Consults  Subjective:     Principal Problem: Colitis    History of Present Illness: 49 yo M with longstanding history of UC currently admitted with c diff in setting of UC.  PT notes that he has required hospital admission multiple times over the last several months given flares of his UC and c diff.  There has been evidence of improvement in degree of his colitis endoscopically however he notes significant decline in his QOL from the colitis.  He notes he constantly struggles with diarrhea and poor control which has negatively impacted his QOL.  He notes he is at a point where he now would consider colectomy.  He has PMHx significant for Obesity and UC.  PShx significant for [facility] with mesh    Current Facility-Administered Medications on File Prior to Encounter   Medication    lactated ringers infusion     Current Outpatient Medications on File Prior to Encounter   Medication Sig    budesonide (ENTOCORT EC) 3 mg capsule Take 3 capsules (9 mg total) by mouth once daily.    busPIRone (BUSPAR) 15 MG tablet Take 1 tablet (15 mg total) by mouth 3 (three) times daily.    colchicine (COLCRYS) 0.6 mg tablet Take 1 tablet (0.6 mg total) by mouth 2 (two) times daily.    EScitalopram oxalate (LEXAPRO) 20 MG tablet Take 1 tablet (20 mg total) by mouth once daily.    HYDROcodone-acetaminophen (NORCO) 7.5-325 mg per tablet Take 1 tablet by mouth every 6 (six) hours as needed for Pain.    hyoscyamine (LEVBID) 0.375 mg Tb12 Take 0.375 mg by mouth 2 (two) times daily.    LORazepam (ATIVAN) 0.5 MG tablet Take 1 tablet (0.5 mg total) by mouth every 6 (six) hours as needed for Anxiety.     magnesium oxide (MAG-OX) 400 mg (241.3 mg magnesium) tablet Take 1 tablet (400 mg total) by mouth 2 (two) times daily.    metoclopramide HCl (REGLAN) 5 MG tablet Take 1 tablet (5 mg total) by mouth every 8 (eight) hours as needed (take as needed before meals).    metoprolol succinate (TOPROL-XL) 50 MG 24 hr tablet Take 1 tablet (50 mg total) by mouth once daily.    pantoprazole (PROTONIX) 40 MG tablet Take 40 mg by mouth 2 (two) times daily.    promethazine (PHENERGAN) 25 MG tablet Take 25 mg by mouth 4 (four) times daily as needed for Nausea.    rivaroxaban (XARELTO) 20 mg Tab Take 1 tablet (20 mg total) by mouth daily with dinner or evening meal.    simvastatin (ZOCOR) 20 MG tablet Take 1 tablet (20 mg total) by mouth every evening.    zolpidem (AMBIEN) 10 mg Tab Take 1 tablet (10 mg total) by mouth nightly as needed (insomnia).       Review of patient's allergies indicates:  No Known Allergies    Past Medical History:   Diagnosis Date    C. difficile colitis     Cavitary pneumonia 11/2023    pos aspergillus serology    Diabetes mellitus 01/2022    Hyperlipidemia 2015    Hypertension 2015    Insomnia 2015    Ulcerative colitis      Past Surgical History:   Procedure Laterality Date    BRONCHOSCOPY WITH FLUOROSCOPY Left 11/20/2023    Procedure: BRONCHOSCOPY, WITH FLUOROSCOPY;  Surgeon: Lisa Villarreal MD;  Location: [facility];  Service: Pulmonary;  Laterality: Left;    BRONCHOSCOPY WITH FLUOROSCOPY N/A 11/30/2023    Procedure: BRONCHOSCOPY, WITH FLUOROSCOPY;  Surgeon: Lisa Villarreal MD;  Location: [facility];  Service: Pulmonary;  Laterality: N/A;    CARDIAC ELECTROPHYSIOLOGY MAPPING AND ABLATION  2017    SVT    CHOLECYSTECTOMY  2011    COLONOSCOPY N/A 5/3/2022    Procedure: COLONOSCOPY;  Surgeon: Shan Jean III, MD;  Location: [facility];  Service: Endoscopy;  Laterality: N/A;    COLONOSCOPY N/A 3/16/2024    Procedure: COLONOSCOPY;  Surgeon: ALEKSANDER Ramos MD;  Location: [facility];  Service:  Endoscopy;  Laterality: N/A;    COLONOSCOPY WITH FECAL MICROBIOTA TRANSFER N/A 3/21/2023    Procedure: COLONOSCOPY, WITH FECAL MICROBIOTA TRANSFER;  Surgeon: David Millan MD;  Location: [facility];  Service: Endoscopy;  Laterality: N/A;    ESOPHAGOGASTRODUODENOSCOPY N/A 4/24/2023    Procedure: EGD (ESOPHAGOGASTRODUODENOSCOPY);  Surgeon: Shan Jean III, MD;  Location: [facility];  Service: Endoscopy;  Laterality: N/A;    ESOPHAGOGASTRODUODENOSCOPY N/A 6/5/2024    Procedure: EGD (ESOPHAGOGASTRODUODENOSCOPY);  Surgeon: Odalis Mccabe MD;  Location: [facility];  Service: Endoscopy;  Laterality: N/A;    FLEXIBLE SIGMOIDOSCOPY N/A 6/5/2024    Procedure: SIGMOIDOSCOPY, FLEXIBLE;  Surgeon: Odalis Mccabe MD;  Location: [facility];  Service: Endoscopy;  Laterality: N/A;    FLEXIBLE SIGMOIDOSCOPY N/A 7/16/2024    Procedure: SIGMOIDOSCOPY, FLEXIBLE;  Surgeon: Shan Jean III, MD;  Location: [facility];  Service: Endoscopy;  Laterality: N/A;    FLEXIBLE SIGMOIDOSCOPY N/A 8/13/2024    Procedure: SIGMOIDOSCOPY, FLEXIBLE;  Surgeon: Shan Jean III, MD;  Location: [facility];  Service: Endoscopy;  Laterality: N/A;    GANGLION CYST EXCISION Left 1992    UMBILICAL HERNIA REPAIR  2011    with mesh     Family History       Problem Relation (Age of Onset)    Asthma Mother          Tobacco Use    Smoking status: Former     Average packs/day: 1 pack/day for 30.0 years (30.0 ttl pk-yrs)     Types: Cigarettes     Start date: 1993    Smokeless tobacco: Never    Tobacco comments:     Pt states he has stopped smoking with Chantix three weeks ago. 12/1/23   Substance and Sexual Activity    Alcohol use: Yes     Comment: ocass    Drug use: Not Currently    Sexual activity: Not Currently     Review of Systems   Constitutional:  Negative for activity change and appetite change.   Gastrointestinal:  Positive for abdominal pain and diarrhea.   Skin:  Negative for color change.     Objective:     Vital Signs (Most  Recent):  Temp: 98.4 °F (36.9 °C) (01/08/25 0730)  Pulse: 64 (01/08/25 0730)  Resp: 16 (01/08/25 0823)  BP: 132/62 (01/08/25 0730)  SpO2: 97 % (01/08/25 0730) Vital Signs (24h Range):  Temp:  [98.2 °F (36.8 °C)-98.7 °F (37.1 °C)] 98.4 °F (36.9 °C)  Pulse:  [64-76] 64  Resp:  [16-18] 16  SpO2:  [96 %-98 %] 97 %  BP: (107-132)/(60-76) 132/62     Weight: 132.9 kg (293 lb)  Body mass index is 48.76 kg/m².     Physical Exam  Vitals reviewed.   Cardiovascular:      Rate and Rhythm: Normal rate.      Pulses: Normal pulses.   Pulmonary:      Effort: Pulmonary effort is normal.   Abdominal:      General: There is no distension.      Tenderness: There is no abdominal tenderness. There is no guarding.   Neurological:      Mental Status: He is alert.            I have reviewed all pertinent lab results within the past 24 hours.  CBC:   Recent Labs   Lab 01/08/25 0513   WBC 8.74   RBC 3.60*   HGB 9.5*   HCT 31.1*      MCV 86   MCH 26.4*   MCHC 30.5*     BMP:   Recent Labs   Lab 01/08/25 0513   GLU 93      K 3.9      CO2 26   BUN 7   CREATININE 1.1   CALCIUM 8.3*   MG 1.7       Significant Diagnostics:  CT reviewed.  COlitis noted in descending and sigmoid colon       Assessment/Plan:   UC  Difficult decision.  Per pt he is at a point where he consider medical therapy to have failed.  He indicates he would like to consider surgical resection.   In current situation surgery would certainly require Total abdominal colectomy with end ileostomy.  Ileostomy may be permanent.  Given body habitus placement of ileostomy could be technically difficult.    Would not consider reversal until considerable weight loss. He would still require rectal surveillance.    He expresses understanding and notes he feels he would like to puruse.  I have encouraged him to give further thought.  It does appear that his IBD process has been improving with current medical regimen and once c diff controlled he might see continued  improvement with conservative medical mgmt.  I would tend to favor continued medical mgmt given lack of true strong surgical indications.  If surgery still strongly desired by pt or recommended by GI/ID I would be available to do Friday or MOnday     VTE Risk Mitigation (From admission, onward)           Ordered     rivaroxaban tablet 20 mg  With dinner         01/03/25 1445     Reason for No Pharmacological VTE Prophylaxis  Once        Question:  Reasons:  Answer:  Active Bleeding    12/31/24 1801     IP VTE HIGH RISK PATIENT  Once         12/31/24 1801     Place sequential compression device  Until discontinued         12/31/24 1801                    Thank you for your consult. I will follow-up with patient. Please contact us if you have any additional questions.    Ashok Mohan MD  General Surgery  [facility]

## 2025-01-08 NOTE — PLAN OF CARE
The patient remain medically active and not ready for discharge. The patient continues to have diarrhea. Surgery has been consulted and a colectomy with ileostomy is pending. GI and ID is also continuing to follow. CM will continue to follow and assist with discharge planning.       01/08/25 1340   Discharge Reassessment   Assessment Type Discharge Planning Reassessment   Did the patient's condition or plan change since previous assessment? Yes   Discharge Plan discussed with: Patient   Communicated GERARDO with patient/caregiver Yes   Discharge Plan A Home   Discharge Plan B Home with family   DME Needed Upon Discharge  none   Transition of Care Barriers None   Why the patient remains in the hospital Requires continued medical care

## 2025-01-08 NOTE — ASSESSMENT & PLAN NOTE
C diff antigen positive, toxin negative, PCR positive   Continue fidaxomicin   Not much improvement  GI following   Id following   General surgery following   We will attempt to optimize nutritional status prior to possible colectomy  PT/OT/nutrition consulted

## 2025-01-08 NOTE — PROGRESS NOTES
[facility] Medicine  Progress Note    Patient Name: Jacoby Jean-Baptiste  MRN: [id number]  Patient Class: IP- Inpatient   Admission Date: 12/31/2024  Length of Stay: 6 days  Attending Physician: Adrian Estrada DO  Primary Care Provider: Hunter Aguilar III, MD        Subjective     Principal Problem:Colitis        HPI:  48 yo male with PMH of ulcerative colitis presented because of worsening abdominal pain and diarrhea.  According to the patient, he has a h/o on and off diarrhea and abdominal cramp related to his ulcerative colitis.  He was recently admitted on 12/22 because of abdominal pain/nausea/vomiting/hematochezia.  At that time, his CT showed pan colitis, was seen by GI who suggested that his symptoms are likely not secondary to ulcerative colitis flare.  After his discharge, he started to have perfused diarrhea, and abdominal cramps/pain.  He is having anywhere between 10-15 bowel movements a day, some of them were hematochezia.  His abdominal pain is described as crampy, severe, 10/10 on pain scale.  He has been having chills with no fever. As a result, he decided to come back to the ER for evaluation.    In the ER, vitals showed blood pressure of 126/83, heart rate of 84, respiratory rate of 18, afebrile satting 99% on room air.  CBC with white count of 13.5, and hemoglobin of 10.8.  CBC with sodium of 134.  CRP is mildly elevated at 2.8. CT abdomen/pelvis showed findings compatible with acute colitis slightly worsened when compared to December 22, 2024. No evidence of perforation or pneumatosis.  He was given Reglan, Benadryl and Zofran.  He will be admitted to Medicine for further management.      Overview/Hospital Course:      49-year-old gentleman with history of ulcerative colitis and c diff who presented with high frequency diarrhea described as clear in color without blood.  He has been seen by GI and currently it is felt that this represents recurrent C diff. He has been  placed on Dificid beginning the evening of 1/2. He can be discharged on 1/4 if diarrhea continues to improve.  Outpatient GI office will continue to arrange his getting zinplava after discharge prior to end of dificid therapy.      While here, his C diff antigen was positive whereas it was previously negative during prior hospitalizations.  Toxin EIA was negative and PCR pending.     Regarding his ulcerative colitis, he has now received 2 doses of Tremfya as an outpatient.  Gastroenterology is not recommending changes to his ulcerative colitis regimen.      Wound care consulted for chronic wound.       Interval History:  Patient was seen and examined at bedside this morning.  He is not improving.  Continues to have upwards of 12 episodes of diarrhea daily.  Remains on fidaxomicin.  Evaluated by GI, id, General surgery.  Attempting to optimize nutritional status prior to possible surgery.    Review of Systems  Objective:     Vital Signs (Most Recent):  Temp: 98 °F (36.7 °C) (01/08/25 1615)  Pulse: 65 (01/08/25 1615)  Resp: 16 (01/08/25 1615)  BP: (!) 154/71 (01/08/25 1615)  SpO2: (!) 93 % (01/08/25 1615) Vital Signs (24h Range):  Temp:  [98 °F (36.7 °C)-98.7 °F (37.1 °C)] 98 °F (36.7 °C)  Pulse:  [64-76] 65  Resp:  [16-18] 16  SpO2:  [93 %-97 %] 93 %  BP: (107-154)/(59-76) 154/71     Weight: 132.9 kg (293 lb)  Body mass index is 48.76 kg/m².    Intake/Output Summary (Last 24 hours) at 1/8/2025 1644  Last data filed at 1/8/2025 1627  Gross per 24 hour   Intake 1240 ml   Output --   Net 1240 ml         Physical Exam      NAD, A/O 3   RRR  CTAB   Soft, nondistended, bowel sounds present, mild diffuse tenderness without rebound or guarding   No edema     Significant Labs: All pertinent labs within the past 24 hours have been reviewed.  CBC:   Recent Labs   Lab 01/07/25  0523 01/08/25  0513   WBC 9.49 8.74   HGB 9.8* 9.5*   HCT 31.2* 31.1*    263     CMP:   Recent Labs   Lab 01/07/25  0523 01/08/25  0513     138   K 4.0 3.9    106   CO2 25 26    93   BUN 9 7   CREATININE 1.0 1.1   CALCIUM 8.4* 8.3*   PROT 6.2 6.0   ALBUMIN 3.2* 3.1*   BILITOT 0.3 0.3   ALKPHOS 55 52*   AST 9* 7*   ALT 6* 6*   ANIONGAP 10 6*     Magnesium:   Recent Labs   Lab 01/07/25  0523 01/08/25  0513   MG 1.5* 1.7       Significant Imaging: I have reviewed all pertinent imaging results/findings within the past 24 hours.  Imaging Results              X-Ray Chest AP Portable (Final result)  Result time 12/31/24 18:42:01      Final result by Mirta Gayle MD (12/31/24 18:42:01)                   Impression:      No acute findings.      Electronically signed by: Mirta Gayle  Date:    12/31/2024  Time:    18:42               Narrative:    EXAMINATION:  XR CHEST AP PORTABLE    CLINICAL HISTORY:  leukocytosis;    TECHNIQUE:  Single frontal view of the chest was performed.    COMPARISON:  11/27/2024    FINDINGS:  Lungs are clear. No focal consolidation. No pleural effusion. No pneumothorax. Normal heart size.                                       CT Abdomen Pelvis With IV Contrast NO Oral Contrast (Final result)  Result time 12/31/24 15:14:55      Final result by Samuel Boyle MD (12/31/24 15:14:55)                   Impression:      1. CT findings compatible with acute colitis as above, slightly worsened when compared to December 22, 2024.  No evidence of perforation or pneumatosis.  2. Additional stable findings as above.      Electronically signed by: Samuel Boyle  Date:    12/31/2024  Time:    15:14               Narrative:    EXAMINATION:  CT ABDOMEN PELVIS WITH IV CONTRAST    CLINICAL HISTORY:  Abdominal pain, acute, nonlocalized;.    TECHNIQUE:  Post infusion axial images were obtained from the lung bases to the pubic symphysis 100 cc nonionic contrast was utilized for the examination.    COMPARISON:  December 22, 2024    FINDINGS:  The lung bases are unremarkable.    No hepatic mass or contour abnormality is  identified.  The gallbladder is absent.  The biliary tree is nondilated.  The spleen, pancreas, and adrenal glands are normal.  Bilateral nonobstructing renal calculi are similar to the prior study, the largest of which is at the lower pole on the left measuring 7 mm.  There are no ureteral calculi and there is no hydronephrosis.  The abdominal aorta is normal in caliber.    Assessment of bowel structures reveals persistent mild abnormal circumferential wall thickening of the colon from the proximal transverse colon to the rectum.  Compared to the prior study, the degree of wall thickening has increased slightly, with slightly increasing pericolonic edema, most notably at the level of the sigmoid colon.  Findings are compatible with acute colitis.  There is no evidence of perforation, pneumatosis, or obstruction.  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    Images of the pelvis demonstrate a normal urinary bladder.  There is no drainable free fluid or lymphadenopathy.    No acute osseous abnormalities are identified.                                        Assessment and Plan     * Colitis  C diff antigen positive, toxin negative, PCR positive   Continue fidaxomicin   Not much improvement  GI following   Id following   General surgery following   We will attempt to optimize nutritional status prior to possible colectomy  PT/OT/nutrition consulted    Leukocytosis    WBC normal   Hemodynamically stable   Afebrile      Gastroparesis  Continue symptom support    DVT (deep venous thrombosis)  Continue Xarelto    Open wound of left upper arm  Wound care following      Ulcerative colitis    The patient is on outpatient tremfya    BMI 45.0-49.9, adult  Body mass index is 48.76 kg/m². Morbid obesity complicates all aspects of disease management from diagnostic modalities to treatment. Weight loss encouraged and health benefits explained to patient.         History of supraventricular  tachycardia  continue metoprolol.        VTE Risk Mitigation (From admission, onward)           Ordered     rivaroxaban tablet 20 mg  With dinner         01/03/25 1445     Reason for No Pharmacological VTE Prophylaxis  Once        Question:  Reasons:  Answer:  Active Bleeding    12/31/24 1801     IP VTE HIGH RISK PATIENT  Once         12/31/24 1801     Place sequential compression device  Until discontinued         12/31/24 1801                    Discharge Planning   GERARDO:      Code Status: Full Code   Medical Readiness for Discharge Date:   Discharge Plan A: Home                Please place Justification for DME        Adrian Estrada DO  Department of Hospital Medicine   Formerly [facility]

## 2025-01-08 NOTE — SUBJECTIVE & OBJECTIVE
Current Facility-Administered Medications on File Prior to Encounter   Medication    lactated ringers infusion     Current Outpatient Medications on File Prior to Encounter   Medication Sig    budesonide (ENTOCORT EC) 3 mg capsule Take 3 capsules (9 mg total) by mouth once daily.    busPIRone (BUSPAR) 15 MG tablet Take 1 tablet (15 mg total) by mouth 3 (three) times daily.    colchicine (COLCRYS) 0.6 mg tablet Take 1 tablet (0.6 mg total) by mouth 2 (two) times daily.    EScitalopram oxalate (LEXAPRO) 20 MG tablet Take 1 tablet (20 mg total) by mouth once daily.    HYDROcodone-acetaminophen (NORCO) 7.5-325 mg per tablet Take 1 tablet by mouth every 6 (six) hours as needed for Pain.    hyoscyamine (LEVBID) 0.375 mg Tb12 Take 0.375 mg by mouth 2 (two) times daily.    LORazepam (ATIVAN) 0.5 MG tablet Take 1 tablet (0.5 mg total) by mouth every 6 (six) hours as needed for Anxiety.    magnesium oxide (MAG-OX) 400 mg (241.3 mg magnesium) tablet Take 1 tablet (400 mg total) by mouth 2 (two) times daily.    metoclopramide HCl (REGLAN) 5 MG tablet Take 1 tablet (5 mg total) by mouth every 8 (eight) hours as needed (take as needed before meals).    metoprolol succinate (TOPROL-XL) 50 MG 24 hr tablet Take 1 tablet (50 mg total) by mouth once daily.    pantoprazole (PROTONIX) 40 MG tablet Take 40 mg by mouth 2 (two) times daily.    promethazine (PHENERGAN) 25 MG tablet Take 25 mg by mouth 4 (four) times daily as needed for Nausea.    rivaroxaban (XARELTO) 20 mg Tab Take 1 tablet (20 mg total) by mouth daily with dinner or evening meal.    simvastatin (ZOCOR) 20 MG tablet Take 1 tablet (20 mg total) by mouth every evening.    zolpidem (AMBIEN) 10 mg Tab Take 1 tablet (10 mg total) by mouth nightly as needed (insomnia).       Review of patient's allergies indicates:  No Known Allergies    Past Medical History:   Diagnosis Date    C. difficile colitis     Cavitary pneumonia 11/2023    pos aspergillus serology    Diabetes mellitus  01/2022    Hyperlipidemia 2015    Hypertension 2015    Insomnia 2015    Ulcerative colitis      Past Surgical History:   Procedure Laterality Date    BRONCHOSCOPY WITH FLUOROSCOPY Left 11/20/2023    Procedure: BRONCHOSCOPY, WITH FLUOROSCOPY;  Surgeon: Lisa Villarreal MD;  Location: [facility];  Service: Pulmonary;  Laterality: Left;    BRONCHOSCOPY WITH FLUOROSCOPY N/A 11/30/2023    Procedure: BRONCHOSCOPY, WITH FLUOROSCOPY;  Surgeon: Lisa Villarreal MD;  Location: [facility];  Service: Pulmonary;  Laterality: N/A;    CARDIAC ELECTROPHYSIOLOGY MAPPING AND ABLATION  2017    SVT    CHOLECYSTECTOMY  2011    COLONOSCOPY N/A 5/3/2022    Procedure: COLONOSCOPY;  Surgeon: Shan Jean III, MD;  Location: [facility];  Service: Endoscopy;  Laterality: N/A;    COLONOSCOPY N/A 3/16/2024    Procedure: COLONOSCOPY;  Surgeon: ALEKSANDER Ramos MD;  Location: [facility];  Service: Endoscopy;  Laterality: N/A;    COLONOSCOPY WITH FECAL MICROBIOTA TRANSFER N/A 3/21/2023    Procedure: COLONOSCOPY, WITH FECAL MICROBIOTA TRANSFER;  Surgeon: David Millan MD;  Location: [facility];  Service: Endoscopy;  Laterality: N/A;    ESOPHAGOGASTRODUODENOSCOPY N/A 4/24/2023    Procedure: EGD (ESOPHAGOGASTRODUODENOSCOPY);  Surgeon: Shan Jena III, MD;  Location: [facility];  Service: Endoscopy;  Laterality: N/A;    ESOPHAGOGASTRODUODENOSCOPY N/A 6/5/2024    Procedure: EGD (ESOPHAGOGASTRODUODENOSCOPY);  Surgeon: Odalis Mccabe MD;  Location: [facility];  Service: Endoscopy;  Laterality: N/A;    FLEXIBLE SIGMOIDOSCOPY N/A 6/5/2024    Procedure: SIGMOIDOSCOPY, FLEXIBLE;  Surgeon: Odalis Mccabe MD;  Location[facility];  Service: Endoscopy;  Laterality: N/A;    FLEXIBLE SIGMOIDOSCOPY N/A 7/16/2024    Procedure: SIGMOIDOSCOPY, FLEXIBLE;  Surgeon: Shan Jean III, MD;  Location: [facility];  Service: Endoscopy;  Laterality: N/A;    FLEXIBLE SIGMOIDOSCOPY N/A 8/13/2024    Procedure: SIGMOIDOSCOPY, FLEXIBLE;  Surgeon:  Shan Jean III, MD;  Location: [facility];  Service: Endoscopy;  Laterality: N/A;    GANGLION CYST EXCISION Left 1992    UMBILICAL HERNIA REPAIR  2011    with mesh     Family History       Problem Relation (Age of Onset)    Asthma Mother          Tobacco Use    Smoking status: Former     Average packs/day: 1 pack/day for 30.0 years (30.0 ttl pk-yrs)     Types: Cigarettes     Start date: 1993    Smokeless tobacco: Never    Tobacco comments:     Pt states he has stopped smoking with Chantix three weeks ago. 12/1/23   Substance and Sexual Activity    Alcohol use: Yes     Comment: ocass    Drug use: Not Currently    Sexual activity: Not Currently     Review of Systems   Constitutional:  Negative for activity change and appetite change.   Gastrointestinal:  Positive for abdominal pain and diarrhea.   Skin:  Negative for color change.     Objective:     Vital Signs (Most Recent):  Temp: 98.4 °F (36.9 °C) (01/08/25 0730)  Pulse: 64 (01/08/25 0730)  Resp: 16 (01/08/25 0823)  BP: 132/62 (01/08/25 0730)  SpO2: 97 % (01/08/25 0730) Vital Signs (24h Range):  Temp:  [98.2 °F (36.8 °C)-98.7 °F (37.1 °C)] 98.4 °F (36.9 °C)  Pulse:  [64-76] 64  Resp:  [16-18] 16  SpO2:  [96 %-98 %] 97 %  BP: (107-132)/(60-76) 132/62     Weight: 132.9 kg (293 lb)  Body mass index is 48.76 kg/m².     Physical Exam  Vitals reviewed.   Cardiovascular:      Rate and Rhythm: Normal rate.      Pulses: Normal pulses.   Pulmonary:      Effort: Pulmonary effort is normal.   Abdominal:      General: There is no distension.      Tenderness: There is no abdominal tenderness. There is no guarding.   Neurological:      Mental Status: He is alert.            I have reviewed all pertinent lab results within the past 24 hours.  CBC:   Recent Labs   Lab 01/08/25 0513   WBC 8.74   RBC 3.60*   HGB 9.5*   HCT 31.1*      MCV 86   MCH 26.4*   MCHC 30.5*     BMP:   Recent Labs   Lab 01/08/25 0513   GLU 93      K 3.9      CO2 26   BUN 7    CREATININE 1.1   CALCIUM 8.3*   MG 1.7       Significant Diagnostics:  CT reviewed.  COlitis noted in descending and sigmoid colon

## 2025-01-09 VITALS
HEART RATE: 72 BPM | TEMPERATURE: 98 F | HEIGHT: 65 IN | SYSTOLIC BLOOD PRESSURE: 112 MMHG | WEIGHT: 293 LBS | RESPIRATION RATE: 18 BRPM | OXYGEN SATURATION: 96 % | BODY MASS INDEX: 48.82 KG/M2 | DIASTOLIC BLOOD PRESSURE: 58 MMHG

## 2025-01-09 LAB
ALBUMIN SERPL BCP-MCNC: 3 G/DL (ref 3.5–5.2)
ALP SERPL-CCNC: 52 U/L (ref 55–135)
ALT SERPL W/O P-5'-P-CCNC: 5 U/L (ref 10–44)
ANION GAP SERPL CALC-SCNC: 6 MMOL/L (ref 8–16)
AST SERPL-CCNC: 7 U/L (ref 10–40)
BASOPHILS # BLD AUTO: 0.05 K/UL (ref 0–0.2)
BASOPHILS NFR BLD: 0.5 % (ref 0–1.9)
BILIRUB SERPL-MCNC: 0.3 MG/DL (ref 0.1–1)
BUN SERPL-MCNC: 6 MG/DL (ref 6–20)
CALCIUM SERPL-MCNC: 8.2 MG/DL (ref 8.7–10.5)
CHLORIDE SERPL-SCNC: 107 MMOL/L (ref 95–110)
CO2 SERPL-SCNC: 25 MMOL/L (ref 23–29)
CREAT SERPL-MCNC: 1 MG/DL (ref 0.5–1.4)
DIFFERENTIAL METHOD BLD: ABNORMAL
EOSINOPHIL # BLD AUTO: 0.7 K/UL (ref 0–0.5)
EOSINOPHIL NFR BLD: 7.3 % (ref 0–8)
ERYTHROCYTE [DISTWIDTH] IN BLOOD BY AUTOMATED COUNT: 16.8 % (ref 11.5–14.5)
EST. GFR  (NO RACE VARIABLE): >60 ML/MIN/1.73 M^2
GLUCOSE SERPL-MCNC: 88 MG/DL (ref 70–110)
HCT VFR BLD AUTO: 31.6 % (ref 40–54)
HGB BLD-MCNC: 9.4 G/DL (ref 14–18)
IMM GRANULOCYTES # BLD AUTO: 0.03 K/UL (ref 0–0.04)
IMM GRANULOCYTES NFR BLD AUTO: 0.3 % (ref 0–0.5)
LYMPHOCYTES # BLD AUTO: 1.2 K/UL (ref 1–4.8)
LYMPHOCYTES NFR BLD: 13.4 % (ref 18–48)
MAGNESIUM SERPL-MCNC: 1.6 MG/DL (ref 1.6–2.6)
MCH RBC QN AUTO: 26 PG (ref 27–31)
MCHC RBC AUTO-ENTMCNC: 29.7 G/DL (ref 32–36)
MCV RBC AUTO: 88 FL (ref 82–98)
MONOCYTES # BLD AUTO: 1.3 K/UL (ref 0.3–1)
MONOCYTES NFR BLD: 13.5 % (ref 4–15)
NEUTROPHILS # BLD AUTO: 6 K/UL (ref 1.8–7.7)
NEUTROPHILS NFR BLD: 65 % (ref 38–73)
NRBC BLD-RTO: 0 /100 WBC
PLATELET # BLD AUTO: 255 K/UL (ref 150–450)
PMV BLD AUTO: 10 FL (ref 9.2–12.9)
POTASSIUM SERPL-SCNC: 3.7 MMOL/L (ref 3.5–5.1)
PROT SERPL-MCNC: 5.9 G/DL (ref 6–8.4)
RBC # BLD AUTO: 3.61 M/UL (ref 4.6–6.2)
SODIUM SERPL-SCNC: 138 MMOL/L (ref 136–145)
WBC # BLD AUTO: 9.28 K/UL (ref 3.9–12.7)

## 2025-01-09 PROCEDURE — 83735 ASSAY OF MAGNESIUM: CPT | Performed by: HOSPITALIST

## 2025-01-09 PROCEDURE — 99233 SBSQ HOSP IP/OBS HIGH 50: CPT | Mod: ,,, | Performed by: STUDENT IN AN ORGANIZED HEALTH CARE EDUCATION/TRAINING PROGRAM

## 2025-01-09 PROCEDURE — 85025 COMPLETE CBC W/AUTO DIFF WBC: CPT | Performed by: HOSPITALIST

## 2025-01-09 PROCEDURE — 97165 OT EVAL LOW COMPLEX 30 MIN: CPT

## 2025-01-09 PROCEDURE — 97161 PT EVAL LOW COMPLEX 20 MIN: CPT

## 2025-01-09 PROCEDURE — 63600175 PHARM REV CODE 636 W HCPCS: Performed by: INTERNAL MEDICINE

## 2025-01-09 PROCEDURE — 25000003 PHARM REV CODE 250: Performed by: INTERNAL MEDICINE

## 2025-01-09 PROCEDURE — 80053 COMPREHEN METABOLIC PANEL: CPT | Performed by: HOSPITALIST

## 2025-01-09 PROCEDURE — 36415 COLL VENOUS BLD VENIPUNCTURE: CPT | Performed by: HOSPITALIST

## 2025-01-09 PROCEDURE — 25000003 PHARM REV CODE 250: Performed by: STUDENT IN AN ORGANIZED HEALTH CARE EDUCATION/TRAINING PROGRAM

## 2025-01-09 PROCEDURE — 25000003 PHARM REV CODE 250

## 2025-01-09 PROCEDURE — 25000003 PHARM REV CODE 250: Performed by: HOSPITALIST

## 2025-01-09 RX ORDER — DIPHENOXYLATE HYDROCHLORIDE AND ATROPINE SULFATE 2.5; .025 MG/1; MG/1
1 TABLET ORAL 4 TIMES DAILY PRN
Qty: 40 TABLET | Refills: 0 | Status: SHIPPED | OUTPATIENT
Start: 2025-01-09 | End: 2025-01-19

## 2025-01-09 RX ORDER — HYDROCODONE BITARTRATE AND ACETAMINOPHEN 10; 325 MG/1; MG/1
1 TABLET ORAL EVERY 6 HOURS PRN
Qty: 28 TABLET | Refills: 0 | Status: ON HOLD | OUTPATIENT
Start: 2025-01-09 | End: 2025-01-19 | Stop reason: HOSPADM

## 2025-01-09 RX ADMIN — BUSPIRONE HYDROCHLORIDE 15 MG: 5 TABLET ORAL at 03:01

## 2025-01-09 RX ADMIN — POTASSIUM BICARBONATE 50 MEQ: 978 TABLET, EFFERVESCENT ORAL at 08:01

## 2025-01-09 RX ADMIN — METOPROLOL SUCCINATE 50 MG: 50 TABLET, FILM COATED, EXTENDED RELEASE ORAL at 08:01

## 2025-01-09 RX ADMIN — ESCITALOPRAM OXALATE 20 MG: 10 TABLET ORAL at 08:01

## 2025-01-09 RX ADMIN — DIPHENHYDRAMINE HYDROCHLORIDE 25 MG: 25 CAPSULE ORAL at 08:01

## 2025-01-09 RX ADMIN — HYDROCODONE BITARTRATE AND ACETAMINOPHEN 1 TABLET: 5; 325 TABLET ORAL at 12:01

## 2025-01-09 RX ADMIN — MORPHINE SULFATE 2 MG: 2 INJECTION, SOLUTION INTRAMUSCULAR; INTRAVENOUS at 03:01

## 2025-01-09 RX ADMIN — MAGNESIUM 64 MG (MAGNESIUM CHLORIDE) TABLET,DELAYED RELEASE 64 MG: at 08:01

## 2025-01-09 RX ADMIN — DIPHENHYDRAMINE HYDROCHLORIDE 25 MG: 25 CAPSULE ORAL at 12:01

## 2025-01-09 RX ADMIN — HYOSCYAMINE SULFATE 0.25 MG: 0.12 TABLET SUBLINGUAL at 03:01

## 2025-01-09 RX ADMIN — FIDAXOMICIN 200 MG: 200 TABLET, FILM COATED ORAL at 08:01

## 2025-01-09 RX ADMIN — MORPHINE SULFATE 2 MG: 2 INJECTION, SOLUTION INTRAMUSCULAR; INTRAVENOUS at 10:01

## 2025-01-09 RX ADMIN — BUSPIRONE HYDROCHLORIDE 15 MG: 5 TABLET ORAL at 08:01

## 2025-01-09 RX ADMIN — DIPHENOXYLATE HYDROCHLORIDE AND ATROPINE SULFATE 1 TABLET: 2.5; .025 TABLET ORAL at 09:01

## 2025-01-09 RX ADMIN — SODIUM CHLORIDE: 9 INJECTION, SOLUTION INTRAVENOUS at 08:01

## 2025-01-09 RX ADMIN — DIPHENOXYLATE HYDROCHLORIDE AND ATROPINE SULFATE 1 TABLET: 2.5; .025 TABLET ORAL at 03:01

## 2025-01-09 RX ADMIN — HYOSCYAMINE SULFATE 0.25 MG: 0.12 TABLET SUBLINGUAL at 08:01

## 2025-01-09 RX ADMIN — HYOSCYAMINE SULFATE 0.25 MG: 0.12 TABLET SUBLINGUAL at 12:01

## 2025-01-09 RX ADMIN — ONDANSETRON 4 MG: 2 INJECTION INTRAMUSCULAR; INTRAVENOUS at 08:01

## 2025-01-09 RX ADMIN — DIPHENHYDRAMINE HYDROCHLORIDE 25 MG: 25 CAPSULE ORAL at 03:01

## 2025-01-09 RX ADMIN — HYDROCODONE BITARTRATE AND ACETAMINOPHEN 1 TABLET: 5; 325 TABLET ORAL at 08:01

## 2025-01-09 NOTE — NURSING
At bedside to educate patient regarding ileostomy as patient is interested in this treatment plan for his chronic UC. Patient given education packet including ostomy bag samples. Discussed care of the ileostomy and patient verbalized understanding. Patient verbalized he has been researching an ileostomy for 6 months as he verbalizes no quality of life due to chronic colitis. Did discuss recovery after a complex surgery and patient again verbalized he wanted to have the ileostomy. Explained the difference between the colostomy and an ileostomy and patient again verbalized understanding. Left information packet with the patient. Did wound care to left medial upper arm wound which is stable at this time. Wound care will continue to follow up as needed for wound care.       Left medial upper arm, No drainage.

## 2025-01-09 NOTE — PLAN OF CARE
Problem: Adult Inpatient Plan of Care  Goal: Plan of Care Review  1/9/2025 1429 by Hanna Brown RN  Outcome: Met  1/9/2025 1056 by Hanna Brown RN  Outcome: Progressing  Goal: Patient-Specific Goal (Individualized)  1/9/2025 1429 by Hanna Brown RN  Outcome: Met  1/9/2025 1056 by Hanna Brown, RN  Outcome: Progressing  Goal: Absence of Hospital-Acquired Illness or Injury  1/9/2025 1429 by Hanna Brown, RN  Outcome: Met  1/9/2025 1056 by Hanna Brown RN  Outcome: Progressing  Goal: Optimal Comfort and Wellbeing  1/9/2025 1429 by Hanna Brown RN  Outcome: Met  1/9/2025 1056 by Hanna Brown RN  Outcome: Progressing  Goal: Readiness for Transition of Care  1/9/2025 1429 by Hanna Brown, RN  Outcome: Met  1/9/2025 1056 by Hanna Brown, RN  Outcome: Progressing     Problem: Bariatric Environmental Safety  Goal: Safety Maintained with Care  1/9/2025 1429 by Hanna Brown, RN  Outcome: Met  1/9/2025 1056 by Hanna Brown, RN  Outcome: Progressing     Problem: Diabetes Comorbidity  Goal: Blood Glucose Level Within Targeted Range  1/9/2025 1429 by Hanna Brown, RN  Outcome: Met  1/9/2025 1056 by Hanna Brown, RN  Outcome: Progressing     Problem: Sepsis/Septic Shock  Goal: Optimal Coping  1/9/2025 1429 by Hanna Brown, RN  Outcome: Met  1/9/2025 1056 by Hanna Brown, RN  Outcome: Progressing  Goal: Absence of Bleeding  1/9/2025 1429 by Hanna Brown, RN  Outcome: Met  1/9/2025 1056 by Hanna Brown, RN  Outcome: Progressing  Goal: Blood Glucose Level Within Targeted Range  1/9/2025 1429 by Hanna Brown, RN  Outcome: Met  1/9/2025 1056 by Hanna Brown, RN  Outcome: Progressing  Goal: Absence of Infection Signs and Symptoms  1/9/2025 1429 by Hanna Brown, RN  Outcome: Met  1/9/2025 1056 by Hanna Brown, RN  Outcome: Progressing  Goal: Optimal Nutrition Intake  1/9/2025 1429 by Hanna Brown, RN  Outcome: Met  1/9/2025 1056 by Hanna Brown, RN  Outcome: Progressing     Problem: Acute Kidney  Injury/Impairment  Goal: Fluid and Electrolyte Balance  1/9/2025 1429 by Hanna Brown, RN  Outcome: Met  1/9/2025 1056 by Hanna Brown, RN  Outcome: Progressing  Goal: Improved Oral Intake  1/9/2025 1429 by Hanna Brown, RN  Outcome: Met  1/9/2025 1056 by Hanna Brown, RN  Outcome: Progressing  Goal: Effective Renal Function  1/9/2025 1429 by Hanna Brown, RN  Outcome: Met  1/9/2025 1056 by Hanna Brown, RN  Outcome: Progressing     Problem: Infection  Goal: Absence of Infection Signs and Symptoms  1/9/2025 1429 by Hanna Brown, RN  Outcome: Met  1/9/2025 1056 by Hanna Brown RN  Outcome: Progressing     Problem: Wound  Goal: Optimal Coping  1/9/2025 1429 by Hanna Brown, RN  Outcome: Met  1/9/2025 1056 by Hanna Brown, RN  Outcome: Progressing  Goal: Optimal Functional Ability  1/9/2025 1429 by Hanna Brown, RN  Outcome: Met  1/9/2025 1056 by Hanna Brown, RN  Outcome: Progressing  Goal: Absence of Infection Signs and Symptoms  1/9/2025 1429 by Hanna Brown, RN  Outcome: Met  1/9/2025 1056 by Hanna Brown, RN  Outcome: Progressing  Goal: Improved Oral Intake  1/9/2025 1429 by Hanna Brown, RN  Outcome: Met  1/9/2025 1056 by Hanna Brown, RN  Outcome: Progressing  Goal: Optimal Pain Control and Function  1/9/2025 1429 by Hanna Brown, RN  Outcome: Met  1/9/2025 1056 by Hanna Brown, RN  Outcome: Progressing  Goal: Skin Health and Integrity  1/9/2025 1429 by Hanna Brown, RN  Outcome: Met  1/9/2025 1056 by Hanna Brown, RN  Outcome: Progressing  Goal: Optimal Wound Healing  1/9/2025 1429 by Hanna Brown, RN  Outcome: Met  1/9/2025 1056 by Hanna Borwn, RN  Outcome: Progressing

## 2025-01-09 NOTE — PROGRESS NOTES
Pt seen and examined.  Resting comfortably  NO significnat changes    Prealbumin 9.6    Ultimately discussions with GI and Infectious Disease.  Lengthy discussion with the patient again today.  Patient understandably frustrated given refractory disease.  There was question as to whether or not his current flares related to ulcerative colitis versus C diff infection.  Regardless at present he is medically stable.  He does not have tachycardia, fevers were findings consistent with toxic megacolon.  His pre-albumin yesterday was 9.6.  Lengthy discussion with the patient.  Would be willing to proceed with colectomy and likely end ileostomy at a point in the future.  Explained to patient that prior to proceeding with such a major and invasive surgery it would be best for the patient in particular if his nutritional levels were optimized.  Prior to undergoing such a major surgery that could be scheduled somewhat electively I believe it would be to his benefit to optimize his nutritional performance.  Would encourage nutritional education and support.  Would monitor pre-albumin and plan potential surgery in the future.

## 2025-01-09 NOTE — PLAN OF CARE
reviewed the chart and spoke with the patient and the Attending MD at the bedside. Per MD Estrada, the patient is now medically cleared to discharge home. F/U apt scheduled and added to AVS. In basket sent to MD Jean's office to contact patient with f/u visit. The patient's friend will transport him home. There are no further CM needs.     CM followed up on patient's DC medications. The patient will DC home with fidaxomicin and agreed to pay out of pocket cost. Per MD Estrada, the patient will start IV Zinplava outpatient with GI. CM spoke with  at [facility] pharmacy and she stated the patient's insurance does not cover VSL#3 (probiotic), Attending notified and stated patient can purchase an over the counter probiotic.     The patient is now cleared from a case management standpoint to discharge home.      01/09/25 1536   Final Note   Assessment Type Final Discharge Note   Anticipated Discharge Disposition Home   Hospital Resources/Appts/Education Provided Post-Acute resouces added to AVS

## 2025-01-09 NOTE — PROGRESS NOTES
[facility]   Department of Infectious Disease  Consult Note        PATIENT NAME: Jacoby Jean-Baptiste  MRN: [id number]  TODAY'S DATE: 01/09/2025  ADMIT DATE: 12/31/2024  LOS: 7 days    CHIEF COMPLAINT: Abdominal Pain (Reports history of UC ), Vomiting, and Diarrhea      PRINCIPLE PROBLEM: Colitis    REASON FOR CONSULT: Recurrent, un-improving C diff    INTERVAL HISTORY     1/9/25:  Patient is seen and examined, he feels frustrated because he is not a surgical candidate at the moment given poor nutritional status and morbid obesity.  Patient mentioned he has been having multiple liquid bowel movements, no blood.  11 episodes recorded yesterday, 1 so far today.  Discussed with patient plan to take the time outpatient to improve nutrition, diet, lose some weight and see if he would qualify for colostomy in the future.  Discussed with GI, plan to get  Zinplava outpatient soon.  Prealbumin level is 9.6, very low.     1/8/25: Patient seen and examined at bedside. He still c/o diarrhea and abdominal cramps. He insists he wants to do surgery. Discussed with GI and Surgery and will discuss with patient further to think thorough out this decision.  Hemodynamically stable, stool output decreased today 4 times today so far.  Labs reviewed, white count 8.7, left shift 65.1%, H&H 7.5/31.1, platelet count 263.  Stable electrolytes, creatinine 1.1, AST 7/ALT 6.  We will discuss with GI and General surgery what would be the best course of action in view of patient's body habitus and nutrition status, prealbumin ordered.    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 twice a day will complete a 20 day course followed by 1 pill every other day through January 21st   Plan to have outpatient Bezlotuxumab IV   Alarm symptoms given to the patient   Stop PPIs since this causes recurrent C diff  Consult about dietitian referral to help change and improve his diet and nutrition  Follow-up ID office in 2-3 weeks any provider, office to set up appointment  Special contact precautions  Incentive spirometry   Monitor stool output    Discussed with patient, nursing, Dr. Mohan/Surgery, Dr Jean/GI    Infectious Disease will sign off, call us back with any questions    Antibiotics (From admission, onward)      Start     Stop Route Frequency Ordered    01/02/25 2100  fidaxomicin tablet 200 mg         -- Oral 2 times daily 01/02/25 1418          Antifungals (From admission, onward)      None           Antivirals (From admission, onward)      None            HPI      Jacoby Jean-Baptiste is a 50 y.o. male very well known to our service, morbidly obese, BMI 48.7 kg/m2, he has past medical history of ulcerative colitis on Tremfya follows with GI outpatient, recurrent/relapse serial episode of C diff colitis status post fecal microbiology transplant in March and September of 2023, diabetes, HTN, HLD, anemia, anxiety/insomnia, hep B positive serology, GERD, and Aspergillus/atypical  cavitary pneumonia status post voriconazole in December of 2023.  Patient presented to the emergency room on new year's [place] for worsening diarrhea, as per patient symptoms started mid December he was going to the bathroom 10-12 times a day, some episodes with bloody stool, associated nausea, abdominal cramps, no vomit.  He complains of chills, denies fevers or night sweats.  The patient also complaining of headache, no cough or chest pain or shortness of breath.  Of note, he has a healed skin soft tissue lesion on his left arm, sees wound care outpatient.      Labs on admission leukocytosis 13.5, left shift 77.6%, H&H 10.8/35, platelet count 322, stable electrolytes   CRP on admission 2.8   UA negative   GI PCR negative   Chest x-ray clear   CT abdomen/pelvis on 12/31 consistent with acute colitis slightly worsened when compared to December 22nd.  No evidence of perforation or pneumatosis.    Patient empirically started on ceftriaxone ciprofloxacin and metronidazole on 12/31, both x1 dose.    Hospital course complicated by C diff colitis, on fidaxomicin, today is day 6.    Antibiotic history:    Ceftriaxone 12/31 1 g x 1   Ciprofloxacin 12/31 400 mg IV x1   Metronidazole 12/31 500 mg IV x1    Microbiology:    GI PCR negative   Stool for C diff C diff antigen positive, toxin negative, C diff PCR positive    Social History  Marital Status: Single  Alcohol History:  reports current alcohol use.  Tobacco History:  reports that he has quit smoking. His smoking use included cigarettes. He started smoking about 32 years ago. He has a 30 pack-year smoking history. He has never used smokeless tobacco.  Drug History:  reports that he does not currently use drugs.  He is currently unemployed, awaiting for disability.  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    Review of patient's allergies indicates:  No Known Allergies  Past Medical History:   Diagnosis Date    C. difficile colitis     Cavitary pneumonia 11/2023     pos aspergillus serology    Diabetes mellitus 01/2022    Hyperlipidemia 2015    Hypertension 2015    Insomnia 2015    Ulcerative colitis      Past Surgical History:   Procedure Laterality Date    BRONCHOSCOPY WITH FLUOROSCOPY Left 11/20/2023    Procedure: BRONCHOSCOPY, WITH FLUOROSCOPY;  Surgeon: Lisa Villarreal MD;  Location: [facility];  Service: Pulmonary;  Laterality: Left;    BRONCHOSCOPY WITH FLUOROSCOPY N/A 11/30/2023    Procedure: BRONCHOSCOPY, WITH FLUOROSCOPY;  Surgeon: Lisa Villarreal MD;  Location: [facility];  Service: Pulmonary;  Laterality: N/A;    CARDIAC ELECTROPHYSIOLOGY MAPPING AND ABLATION  2017    SVT    CHOLECYSTECTOMY  2011    COLONOSCOPY N/A 5/3/2022    Procedure: COLONOSCOPY;  Surgeon: Shan Jean III, MD;  Location: [facility];  Service: Endoscopy;  Laterality: N/A;    COLONOSCOPY N/A 3/16/2024    Procedure: COLONOSCOPY;  Surgeon: ALEKSANDER Ramos MD;  Location: [facility];  Service: Endoscopy;  Laterality: N/A;    COLONOSCOPY WITH FECAL MICROBIOTA TRANSFER N/A 3/21/2023    Procedure: COLONOSCOPY, WITH FECAL MICROBIOTA TRANSFER;  Surgeon: David Millan MD;  Location: [facility];  Service: Endoscopy;  Laterality: N/A;    ESOPHAGOGASTRODUODENOSCOPY N/A 4/24/2023    Procedure: EGD (ESOPHAGOGASTRODUODENOSCOPY);  Surgeon: Shan Jean III, MD;  Location: [facility];  Service: Endoscopy;  Laterality: N/A;    ESOPHAGOGASTRODUODENOSCOPY N/A 6/5/2024    Procedure: EGD (ESOPHAGOGASTRODUODENOSCOPY);  Surgeon: Odalis Mccabe MD;  Location: [facility];  Service: Endoscopy;  Laterality: N/A;    FLEXIBLE SIGMOIDOSCOPY N/A 6/5/2024    Procedure: SIGMOIDOSCOPY, FLEXIBLE;  Surgeon: Odalis Mccabe MD;  Location: [facility];  Service: Endoscopy;  Laterality: N/A;    FLEXIBLE SIGMOIDOSCOPY N/A 7/16/2024    Procedure: SIGMOIDOSCOPY, FLEXIBLE;  Surgeon: Shan Jean III, MD;  Location: [facility];  Service: Endoscopy;  Laterality: N/A;    FLEXIBLE SIGMOIDOSCOPY N/A 8/13/2024     Procedure: SIGMOIDOSCOPY, FLEXIBLE;  Surgeon: Shan Jean III, MD;  Location: [facility];  Service: Endoscopy;  Laterality: N/A;    GANGLION CYST EXCISION Left 1992    UMBILICAL HERNIA REPAIR  2011    with mesh     Family History   Problem Relation Name Age of Onset    Asthma Mother         SUBJECTIVE     Review of systems  Twelve point review of systems obtained and negative except as stated above in Interval History     OBJECTIVE   Temp:  [97.4 °F (36.3 °C)-98.7 °F (37.1 °C)] 97.9 °F (36.6 °C)  Pulse:  [65-77] 72  Resp:  [16-18] 18  SpO2:  [93 %-98 %] 96 %  BP: (103-154)/(58-78) 112/58  Temp:  [97.4 °F (36.3 °C)-98.7 °F (37.1 °C)]   Temp: 97.9 °F (36.6 °C) (01/09/25 1145)  Pulse: 72 (01/09/25 0800)  Resp: 18 (01/09/25 1145)  BP: (!) 112/58 (01/09/25 1145)  SpO2: 96 % (01/09/25 1145)    Intake/Output Summary (Last 24 hours) at 1/9/2025 1312  Last data filed at 1/9/2025 0819  Gross per 24 hour   Intake 560 ml   Output --   Net 560 ml       Physical Exam  General:  Morbidly obese  male lying in bed, breathing comfortable on room air  Eyes: Eyes with no icterus or injection. Vision grossly normal  Ears: Hearing grossly normal.  Nose: Nares patent  Mouth: Moist mucous membranes, dentition is terrible, very poor oral hygiene, severe decay. No ulcerations, erythema or exudates.  Neck: Supple, no tenderness to palpation.  Cardiovascular: Regular rate and rhythm, no murmurs, no edema.    Respiratory:  Clear to auscultation bilaterally, no tachypnea or increased work of breathing.  Gastrointestinal:  Soft and obese with active bowel sounds, no tenderness to palpation, no distention.  Genitourinary:  No suprapubic tenderness.  Musculoskeletal:  Moves all extremities with good strength.    Skin:  Warm and dry, resolving multiple scabs on left arm, patient reports he has been scratching, no active evidence of cellulitis  Neuro:   Oriented, conversant, follows commands.  Psych: Good mood, normal affect.  "  VAD: PIV  ISOLATION:  Special contact/C diff       Wounds:  1/2/25:       Significant Labs: All pertinent labs within the past 24 hours have been reviewed.    CBC LAST 7  Recent Labs   Lab 01/03/25  0835 01/04/25  0701 01/05/25  0551 01/06/25  0409 01/07/25  0523 01/08/25  0513 01/09/25  0432   WBC 8.94 10.42 8.81 8.89 9.49 8.74 9.28   RBC 3.80* 3.87* 3.74* 3.58* 3.60* 3.60* 3.61*   HGB 9.9* 10.3* 10.1* 9.4* 9.8* 9.5* 9.4*   HCT 33.6* 33.2* 32.6* 31.0* 31.2* 31.1* 31.6*   MCV 88 86 87 87 87 86 88   MCH 26.1* 26.6* 27.0 26.3* 27.2 26.4* 26.0*   MCHC 29.5* 31.0* 31.0* 30.3* 31.4* 30.5* 29.7*   RDW 17.1* 17.1* 17.2* 16.9* 16.8* 16.8* 16.8*    278 273 257 267 263 255   MPV 10.0 9.7 9.6 9.9 9.8 9.8 10.0   GRAN 69.8  6.3 74.6*  7.8* 64.3  5.7 64.4  5.7 68.8  6.5 65.1  5.7 65.0  6.0   LYMPH 13.4*  1.2 9.1*  1.0 13.8*  1.2 14.1*  1.3 11.3*  1.1 12.8*  1.1 13.4*  1.2   MONO 11.1  1.0 11.6  1.2* 14.9  1.3* 12.9  1.2* 13.0  1.2* 15.1*  1.3* 13.5  1.3*   BASO 0.06 0.04 0.04 0.04 0.05 0.05 0.05   NRBC 0 0 0 0 0 0 0       CHEMISTRY LAST 7  Recent Labs   Lab 01/03/25  0835 01/04/25  0701 01/05/25  0551 01/06/25  0409 01/07/25  0523 01/08/25  0513 01/09/25  0432    137 138 135* 137 138 138   K 3.7 3.9 4.2 3.7 4.0 3.9 3.7    102 102 103 102 106 107   CO2 28 27 29 27 25 26 25   ANIONGAP 5* 8 7* 5* 10 6* 6*   BUN 7 7 8 11 9 7 6   CREATININE 1.1 1.1 1.2 1.2 1.0 1.1 1.0   GLU 85 105 94 96 103 93 88   CALCIUM 8.9 8.8 8.9 8.6* 8.4* 8.3* 8.2*   MG 1.6 1.5* 1.7 1.5* 1.5* 1.7 1.6   ALBUMIN 3.4* 3.4* 3.4* 3.2* 3.2* 3.1* 3.0*   PROT 6.4 6.2 6.4 6.0 6.2 6.0 5.9*   ALKPHOS 66 60 59 53* 55 52* 52*   ALT 6* 6* 6* 6* 6* 6* 5*   AST 8* 7* 9* 8* 9* 7* 7*   BILITOT 0.4 0.3 0.4 0.3 0.3 0.3 0.3       Estimated Creatinine Clearance: 111.4 mL/min (based on SCr of 1 mg/dL).    INFLAMMATORY/PROCAL  LAST 7  No results for input(s): "PROCAL", "ESR", "CRP" in the last 168 hours.  No results found for: "ESR"  CRP   Date " Value Ref Range Status   12/31/2024 2.80 (H) <1.00 mg/dL Final     Comment:     CRP-Normal Application expected values:   <1.0        mg/dL   Normal Range  1.0 - 5.0  mg/dL   Indicates mild inflammation  5.0 - 10.0 mg/dL   Indicates severe inflammation  >10.0        mg/dL   Represents serious processes and   frequently         indicates the presence of a bacterial   infection.      12/22/2024 2.10 (H) <1.00 mg/dL Final     Comment:     CRP-Normal Application expected values:   <1.0        mg/dL   Normal Range  1.0 - 5.0  mg/dL   Indicates mild inflammation  5.0 - 10.0 mg/dL   Indicates severe inflammation  >10.0        mg/dL   Represents serious processes and   frequently         indicates the presence of a bacterial   infection.      12/06/2024 1.90 (H) <1.00 mg/dL Final     Comment:     CRP-Normal Application expected values:   <1.0        mg/dL   Normal Range  1.0 - 5.0  mg/dL   Indicates mild inflammation  5.0 - 10.0 mg/dL   Indicates severe inflammation  >10.0        mg/dL   Represents serious processes and   frequently         indicates the presence of a bacterial   infection.      11/01/2024 2.90 (H) <1.00 mg/dL Final     Comment:     CRP-Normal Application expected values:   <1.0        mg/dL   Normal Range  1.0 - 5.0  mg/dL   Indicates mild inflammation  5.0 - 10.0 mg/dL   Indicates severe inflammation  >10.0        mg/dL   Represents serious processes and   frequently         indicates the presence of a bacterial   infection.      08/11/2024 4.20 (H) <1.00 mg/dL Final     Comment:     CRP-Normal Application expected values:   <1.0        mg/dL   Normal Range  1.0 - 5.0  mg/dL   Indicates mild inflammation  5.0 - 10.0 mg/dL   Indicates severe inflammation  >10.0        mg/dL   Represents serious processes and   frequently         indicates the presence of a bacterial   infection.      07/15/2024 1.80 (H) <1.00 mg/dL Final     Comment:     CRP-Normal Application expected values:   <1.0        mg/dL   Normal  Range  1.0 - 5.0  mg/dL   Indicates mild inflammation  5.0 - 10.0 mg/dL   Indicates severe inflammation  >10.0        mg/dL   Represents serious processes and   frequently         indicates the presence of a bacterial   infection.      07/12/2024 1.30 (H) <1.00 mg/dL Final     Comment:     CRP-Normal Application expected values:   <1.0        mg/dL   Normal Range  1.0 - 5.0  mg/dL   Indicates mild inflammation  5.0 - 10.0 mg/dL   Indicates severe inflammation  >10.0        mg/dL   Represents serious processes and   frequently         indicates the presence of a bacterial   infection.          PRIOR  MICROBIOLOGY:    No results found for the last 90 days.        LAST 7 DAYS MICROBIOLOGY   Microbiology Results (last 7 days)       Procedure Component Value Units Date/Time    C Diff Toxin by PCR [[id number]]  (Abnormal) Collected: 01/04/25 1808    Order Status: Completed Updated: 01/06/25 1235     C. diff PCR Positive     Comment: critical result(s) called and verbal readback obtained from   Sariah Estrada RN 3wing by [facility] 01/06/2025 12:35         Narrative:         critical result(s) called and verbal readback obtained from   Sariah Estrada RN [facility]wing by [facility] 01/06/2025 12:35    Clostridium difficile EIA [[id number]]  (Abnormal) Collected: 01/04/25 1808    Order Status: Completed Updated: 01/06/25 1059     C. diff Antigen Positive     C difficile Toxins A+B, EIA Negative     Comment: Testing not recommended for children <24 months old.                 CURRENT/PREVIOUS VISIT EKG  Results for orders placed or performed during the hospital encounter of 12/22/24   EKG 12-lead    Collection Time: 12/22/24  8:17 AM   Result Value Ref Range    QRS Duration 74 ms    [facility] QTC Calculation 440 ms    Narrative    Test Reason : R11.10,    Vent. Rate :  87 BPM     Atrial Rate :  87 BPM     P-R Int : 146 ms          QRS Dur :  74 ms      QT Int : 366 ms       P-R-T Axes :  58  22  45 degrees    QTcB Int : 440 ms    Normal sinus  rhythm  Normal ECG  Confirmed by Josh Mathis ([id number]) on 12/30/2024 6:02:46 PM    Referred By: AAAREFERRAL SELF           Confirmed By: Josh Mathis          Significant Imaging: I have reviewed all relevant and available imaging results/findings within the past 24 hours.      I spent a total of 51 minutes on the day of the visit.This includes face to face time and non-face to face time preparing to see the patient (eg, review of tests), obtaining and/or reviewing separately obtained history, documenting clinical information in the electronic or other health record, independently interpreting results and communicating results to the patient/family/caregiver, or care coordinator.    Ophelia Galindo MD  Date of Service: 01/09/2025      This note was created using [employer] voice recognition software that occasionally misinterpreted phrases or words.

## 2025-01-09 NOTE — PT/OT/SLP EVAL
Occupational Therapy   Evaluation    Name: Jacoby Jean-Baptiste  MRN: [id number]  Admitting Diagnosis: Colitis  Recent Surgery: * No surgery found *      Recommendations:     Discharge Recommendations: No Therapy Indicated  Discharge Equipment Recommendations:  none  Barriers to discharge:  None    Assessment:     Jacoby Jean-Baptiste is a 50 y.o. male with a medical diagnosis of Colitis.  He presents with general weakness and desating on room air down to 89%. Performance deficits affecting function: weakness, impaired endurance, impaired self care skills, impaired functional mobility, gait instability, impaired balance, decreased safety awareness, decreased lower extremity function, decreased upper extremity function.      Rehab Prognosis: Good; patient would benefit from acute skilled OT services to address these deficits and reach maximum level of function.       Plan:     Patient to be seen 5 x/week to address the above listed problems via self-care/home management, therapeutic activities, therapeutic exercises  Plan of Care Expires: 02/09/25  Plan of Care Reviewed with: patient    Subjective     Chief Complaint: Desating on room air and general weakenss  Patient/Family Comments/goals: Improved endurance, functional mobility and ADL independence.     Occupational Profile:  Living Environment: lives alone in 1 story home, no steps to enter.   Previous level of function: independent with ambulation, ADLs, IADLs, working and driving.   Roles and Routines: primary homemaker  Equipment Used at Home: none  Assistance upon Discharge: None    Pain/Comfort:  Pain Rating 1: 0/10  Pain Rating Post-Intervention 1: 0/10    Patients cultural, spiritual, Bahai conflicts given the current situation: no    Objective:     Communicated with: nurse prior to session.  Patient found HOB elevated with telemetry, peripheral IV upon OT entry to room.    General Precautions: Standard, fall, special contact  Orthopedic Precautions:  N/A  Braces: N/A  Respiratory Status: Nasal cannula, flow 2 L/min    Occupational Performance:    Bed Mobility:    Patient completed Scooting/Bridging with contact guard assistance  Patient completed Supine to Sit with contact guard assistance  Patient completed Sit to Supine with contact guard assistance  Performed unsupported sitting EOB with contact guard assistance.     Functional Mobility/Transfers:  Patient completed Toilet Transfer Step Transfer technique with contact guard assistance with  no AD  Functional Mobility: ambulated 10 feet in the hospital room with contact guard assistance using no AD.    Activities of Daily Living:  Toileting: contact guard assistance to perform toilet hygiene from commode.    Cognitive/Visual Perceptual:  Cognitive/Psychosocial Skills:     -       Oriented to: Person, Place, Time, and Situation   -       Follows Commands/attention:Follows multistep  commands  -       Communication: clear/fluent  -       Memory: No Deficits noted  -       Safety awareness/insight to disability: intact   -       Mood/Affect/Coping skills/emotional control: Cooperative and Pleasant  Visual/Perceptual:      -Intact Acuity    Physical Exam:  Balance:    -       Sitting/Standing: Contact Guard  Upper Extremity Range of Motion:     -       Right Upper Extremity: WFL  -       Left Upper Extremity: WFL  Upper Extremity Strength:    -       Right Upper Extremity: WFL  -       Left Upper Extremity: WFL   Strength:    -       Right Upper Extremity: WFL  -       Left Upper Extremity: WFL  Fine Motor Coordination:    -       Intact    AMPAC 6 Click ADL:  AMPAC Total Score: 18    Treatment & Education:  Patient educated on the purpose of Occupational therapy and the importance of getting OOB.    Patient left HOB elevated with all lines intact, call button in reach, and bed alarm on    GOALS:   Multidisciplinary Problems       Occupational Therapy Goals          Problem: Occupational Therapy    Goal  Priority Disciplines Outcome Interventions   Occupational Therapy Goal     OT, PT/OT     Description: Goals to be met by: 2/9/2025     Patient will increase functional independence with ADLs by performing:    UE Dressing with Supervision.  LE Dressing with Supervision.  Grooming while standing at sink with Supervision.  Toileting from toilet with Supervision for hygiene and clothing management.   Toilet transfer to toilet with Supervision.  Patient will perform 30 minutes sitting/standing ADL activity with O2 sats 90% or above.                         History:     Past Medical History:   Diagnosis Date    C. difficile colitis     Cavitary pneumonia 11/2023    pos aspergillus serology    Diabetes mellitus 01/2022    Hyperlipidemia 2015    Hypertension 2015    Insomnia 2015    Ulcerative colitis          Past Surgical History:   Procedure Laterality Date    BRONCHOSCOPY WITH FLUOROSCOPY Left 11/20/2023    Procedure: BRONCHOSCOPY, WITH FLUOROSCOPY;  Surgeon: Lisa Villarreal MD;  Location: [facility];  Service: Pulmonary;  Laterality: Left;    BRONCHOSCOPY WITH FLUOROSCOPY N/A 11/30/2023    Procedure: BRONCHOSCOPY, WITH FLUOROSCOPY;  Surgeon: Lisa Villarreal MD;  Location: [facility];  Service: Pulmonary;  Laterality: N/A;    CARDIAC ELECTROPHYSIOLOGY MAPPING AND ABLATION  2017    SVT    CHOLECYSTECTOMY  2011    COLONOSCOPY N/A 5/3/2022    Procedure: COLONOSCOPY;  Surgeon: Shan Jean III, MD;  Location: [facility];  Service: Endoscopy;  Laterality: N/A;    COLONOSCOPY N/A 3/16/2024    Procedure: COLONOSCOPY;  Surgeon: ALEKSANDER Ramos MD;  Location: [facility];  Service: Endoscopy;  Laterality: N/A;    COLONOSCOPY WITH FECAL MICROBIOTA TRANSFER N/A 3/21/2023    Procedure: COLONOSCOPY, WITH FECAL MICROBIOTA TRANSFER;  Surgeon: David Millan MD;  Location: [facility];  Service: Endoscopy;  Laterality: N/A;    ESOPHAGOGASTRODUODENOSCOPY N/A 4/24/2023    Procedure: EGD (ESOPHAGOGASTRODUODENOSCOPY);   Surgeon: Shan Jean III, MD;  Location: [facility];  Service: Endoscopy;  Laterality: N/A;    ESOPHAGOGASTRODUODENOSCOPY N/A 6/5/2024    Procedure: EGD (ESOPHAGOGASTRODUODENOSCOPY);  Surgeon: Odalis Mccaeb MD;  Location: [facility];  Service: Endoscopy;  Laterality: N/A;    FLEXIBLE SIGMOIDOSCOPY N/A 6/5/2024    Procedure: SIGMOIDOSCOPY, FLEXIBLE;  Surgeon: Odalis Mccabe MD;  Location: [facility];  Service: Endoscopy;  Laterality: N/A;    FLEXIBLE SIGMOIDOSCOPY N/A 7/16/2024    Procedure: SIGMOIDOSCOPY, FLEXIBLE;  Surgeon: Shan Jean III, MD;  Location: [facility];  Service: Endoscopy;  Laterality: N/A;    FLEXIBLE SIGMOIDOSCOPY N/A 8/13/2024    Procedure: SIGMOIDOSCOPY, FLEXIBLE;  Surgeon: Shan Jean III, MD;  Location: [facility];  Service: Endoscopy;  Laterality: N/A;    GANGLION CYST EXCISION Left 1992    UMBILICAL HERNIA REPAIR  2011    with mesh       Time Tracking:     OT Date of Treatment: 01/09/25  OT Start Time: 0930  OT Stop Time: 0940  OT Total Time (min): 10 min    Billable Minutes:Evaluation 10    1/9/2025

## 2025-01-09 NOTE — NURSING
Pt verbalized understanding of discharge instructions. IV removed. Rx's delivered to pt at bedside. Pt wheeled down to private vehicle with all personal belongings.

## 2025-01-09 NOTE — PT/OT/SLP EVAL
Physical Therapy Evaluation and Discharge Note    Patient Name:  Jacoby Jean-Baptiste   MRN:  [id number]    Recommendations:     Discharge Recommendations: No Therapy Indicated  Discharge Equipment Recommendations: none   Barriers to discharge: None    Assessment:     Jacoby Jean-Baptiste is a 50 y.o. male admitted with a medical diagnosis of Colitis. .  At this time, patient is functioning at their prior level of function and does not require further acute PT services.     Recent Surgery: * No surgery found *      Plan:     During this hospitalization, patient does not require further acute PT services.  Please re-consult if situation changes.      Subjective     Chief Complaint: none   Patient/Family Comments/goals: Return home alone  Pain/Comfort:  Pain Rating 1: 0/10    Patients cultural, spiritual, Synagogue conflicts given the current situation:      Living Environment:  Prior to admission, patients level of function was independent .  Equipment used at home: none.  DME owned (not currently used): none. .    Objective:     Communicated with nurse prior to session.  Patient found supine with telemetry, peripheral IV upon PT entry to room.    General Precautions: Standard, fall    Orthopedic Precautions:    Braces:    Respiratory Status: Room air    Exams:  Cognitive Exam:  Patient is oriented to Person, Place, Time, and Situation  RLE ROM: WFL  RLE Strength: WFL  LLE ROM: WFL  LLE Strength: WFL    Functional Mobility:  Bed Mobility:     Supine to Sit: independence  Sit to Supine: independence  Transfers:     Sit to Stand:  independence with no AD  Gait: 50 ft no AD     AM-PAC 6 CLICK MOBILITY  Total Score:        Treatment and Education:  PT eval and  DC at pt's baseline level of function    AM-PAC 6 CLICK MOBILITY  Total Score:      Patient left supine with all lines intact and call button in reach.    GOALS:   Multidisciplinary Problems       Physical Therapy Goals       Not on file                    History:      Past Medical History:   Diagnosis Date    C. difficile colitis     Cavitary pneumonia 11/2023    pos aspergillus serology    Diabetes mellitus 01/2022    Hyperlipidemia 2015    Hypertension 2015    Insomnia 2015    Ulcerative colitis        Past Surgical History:   Procedure Laterality Date    BRONCHOSCOPY WITH FLUOROSCOPY Left 11/20/2023    Procedure: BRONCHOSCOPY, WITH FLUOROSCOPY;  Surgeon: Lisa Villarreal MD;  Location: [facility];  Service: Pulmonary;  Laterality: Left;    BRONCHOSCOPY WITH FLUOROSCOPY N/A 11/30/2023    Procedure: BRONCHOSCOPY, WITH FLUOROSCOPY;  Surgeon: Lisa Villarreal MD;  Location: [facility];  Service: Pulmonary;  Laterality: N/A;    CARDIAC ELECTROPHYSIOLOGY MAPPING AND ABLATION  2017    SVT    CHOLECYSTECTOMY  2011    COLONOSCOPY N/A 5/3/2022    Procedure: COLONOSCOPY;  Surgeon: Shan Jean III, MD;  Location: [facility];  Service: Endoscopy;  Laterality: N/A;    COLONOSCOPY N/A 3/16/2024    Procedure: COLONOSCOPY;  Surgeon: ALEKSANDER Ramos MD;  Location: [facility];  Service: Endoscopy;  Laterality: N/A;    COLONOSCOPY WITH FECAL MICROBIOTA TRANSFER N/A 3/21/2023    Procedure: COLONOSCOPY, WITH FECAL MICROBIOTA TRANSFER;  Surgeon: David Millan MD;  Location: [facility];  Service: Endoscopy;  Laterality: N/A;    ESOPHAGOGASTRODUODENOSCOPY N/A 4/24/2023    Procedure: EGD (ESOPHAGOGASTRODUODENOSCOPY);  Surgeon: Shan Jean III, MD;  Location: [facility];  Service: Endoscopy;  Laterality: N/A;    ESOPHAGOGASTRODUODENOSCOPY N/A 6/5/2024    Procedure: EGD (ESOPHAGOGASTRODUODENOSCOPY);  Surgeon: Odalis Mccabe MD;  Location: [facility];  Service: Endoscopy;  Laterality: N/A;    FLEXIBLE SIGMOIDOSCOPY N/A 6/5/2024    Procedure: SIGMOIDOSCOPY, FLEXIBLE;  Surgeon: Odalis Mccabe MD;  Location: [facility];  Service: Endoscopy;  Laterality: N/A;    FLEXIBLE SIGMOIDOSCOPY N/A 7/16/2024    Procedure: SIGMOIDOSCOPY, FLEXIBLE;  Surgeon: Shan Jean III, MD;   Location: [facility];  Service: Endoscopy;  Laterality: N/A;    FLEXIBLE SIGMOIDOSCOPY N/A 8/13/2024    Procedure: SIGMOIDOSCOPY, FLEXIBLE;  Surgeon: Shan Jean III, MD;  Location: [facility] END[facility];  Service: Endoscopy;  Laterality: N/A;    GANGLION CYST EXCISION Left 1992    UMBILICAL HERNIA REPAIR  2011    with mesh       Time Tracking:     PT Received On: 01/09/25  PT Start Time: 0959     PT Stop Time: 1005  PT Total Time (min): 6 min     Billable Minutes: Evaluation 6 minutes      01/09/2025

## 2025-01-09 NOTE — DISCHARGE SUMMARY
[facility] Medicine  Discharge Summary      Patient Name: Jacoby Jean-Baptiste  MRN: [id number]  [facility]: [id number]  Patient Class: IP- Inpatient  Admission Date: 12/31/2024  Hospital Length of Stay: 7 days  Discharge Date and Time:  01/09/2025 4:42 PM  Attending Physician: Karyn att. providers found   Discharging Provider: Adrian Estrada DO  Primary Care Provider: Hunter Aguilar III, MD    Primary Care Team: Networked reference to record PCT     HPI:   50 yo male with PMH of ulcerative colitis presented because of worsening abdominal pain and diarrhea.  According to the patient, he has a h/o on and off diarrhea and abdominal cramp related to his ulcerative colitis.  He was recently admitted on 12/22 because of abdominal pain/nausea/vomiting/hematochezia.  At that time, his CT showed pan colitis, was seen by GI who suggested that his symptoms are likely not secondary to ulcerative colitis flare.  After his discharge, he started to have perfused diarrhea, and abdominal cramps/pain.  He is having anywhere between 10-15 bowel movements a day, some of them were hematochezia.  His abdominal pain is described as crampy, severe, 10/10 on pain scale.  He has been having chills with no fever. As a result, he decided to come back to the ER for evaluation.    In the ER, vitals showed blood pressure of 126/83, heart rate of 84, respiratory rate of 18, afebrile satting 99% on room air.  CBC with white count of 13.5, and hemoglobin of 10.8.  CBC with sodium of 134.  CRP is mildly elevated at 2.8. CT abdomen/pelvis showed findings compatible with acute colitis slightly worsened when compared to December 22, 2024. No evidence of perforation or pneumatosis.  He was given Reglan, Benadryl and Zofran.  He will be admitted to Medicine for further management.      * No surgery found *      Hospital Course:       49-year-old gentleman with history of ulcerative colitis and c diff who presented with high frequency  diarrhea described as clear in color without blood.  He has been seen by GI and currently it is felt that this represents recurrent C diff. He has been placed on Dificid beginning the evening of 1/2. He can be discharged on 1/4 if diarrhea continues to improve.  Outpatient GI office will continue to arrange his getting zinplava after discharge prior to end of dificid therapy.      While here, his C diff antigen was positive whereas it was previously negative during prior hospitalizations.  Toxin EIA was negative and PCR pending.     Regarding his ulcerative colitis, he has now received 2 doses of Tremfya as an outpatient.  Gastroenterology is not recommending changes to his ulcerative colitis regimen.      Wound care consulted for chronic wound.     Patient remained on fidaxomicin throughout his hospitalization.  Patient was seen and evaluated by Infectious Disease, Gastroenterology, and General surgery to evaluate for possible colectomy.  Patient did not show much improvement on fidaxomicin.  After discussion with General surgery, who was determined that patient is not yet a candidate for surgery secondary to nutritional status.  Patient was seen and evaluated on day of discharge.  Patient is okay for discharge at this time.  His labs have remained unremarkable, and he is hemodynamically stable.  Continues to have diarrhea, though this we will continue to improve with fidaxomicin.  Plans for patient to discharge to complete a 20 day course of fidaxomicin and to begin Bezlotuxumab as an outpatient to be set up by Gastroenterology.  Patient will follow up outpatient with id, primary care physician, GI, and General surgery.     Goals of Care Treatment Preferences:  Code Status: Full Code      SDOH Screening:  The patient was screened for utility difficulties, food insecurity, transport difficulties, housing insecurity, and interpersonal safety and there were no concerns identified this admission.     Consults:    Consults (From admission, onward)          Status Ordering Provider     Inpatient consult to Registered Dietitian/Nutritionist  Once        Provider:  (Not yet assigned)    Completed COBY DANGELO     Inpatient consult to Registered Dietitian/Nutritionist  Once        Provider:  (Not yet assigned)    Completed KATHLEEN INFANTE     Inpatient consult to Midline team  Once        Provider:  (Not yet assigned)    Completed KATHLEEN INFANTE     Inpatient consult to Infectious Diseases  Once        Provider:  Ophelia Kramer MD    Completed KATHLEEN INFANTE     Inpatient consult to Gastroenterology  Once        Provider:  Shan Jean III, MD    Acknowledged KATHLEEN INFANTE     Inpatient consult to Gastroenterology  Once        Provider:  ALEKSANDER Ramos MD    Completed MITVAHID MARIN            * Colitis  C diff antigen positive, toxin negative, PCR positive   Continue fidaxomicin   Not much improvement  GI following   Id following   General surgery following   We will attempt to optimize nutritional status prior to possible colectomy  PT/OT/nutrition consulted    Leukocytosis    WBC normal   Hemodynamically stable   Afebrile      Gastroparesis  Continue symptom support    DVT (deep venous thrombosis)  Continue Xarelto    Open wound of left upper arm  Wound care following      Ulcerative colitis    The patient is on outpatient tremfya    BMI 45.0-49.9, adult  Body mass index is 48.76 kg/m². Morbid obesity complicates all aspects of disease management from diagnostic modalities to treatment. Weight loss encouraged and health benefits explained to patient.         History of supraventricular tachycardia  continue metoprolol.        Final Active Diagnoses:    Diagnosis Date Noted POA    PRINCIPAL PROBLEM:  Colitis [K52.9] 12/31/2024 Yes    Leukocytosis [D72.829] 12/31/2024 Yes    Gastroparesis [K31.84] 12/23/2024 Yes    DVT (deep venous thrombosis) [I82.409] 12/06/2024 Yes    Open wound of  left upper arm [S41.102A] 08/21/2024 Yes    Ulcerative colitis [K51.90] 01/11/2023 Yes    BMI 45.0-49.9, adult [Z68.42] 06/23/2022 Not Applicable    History of supraventricular tachycardia [Z86.79] 02/03/2021 Not Applicable      Problems Resolved During this Admission:       Discharged Condition: good    Disposition: Home or Self Care    Follow Up:   Follow-up Information       Hunter Aguilar III, MD. Go on 1/28/2025.    Specialty: Family Medicine  Why: please go to hospital follow up appointment at 11:20AM  Contact information:  [address]  [address]  [place] [address]  [phone]               Shan Jean III, MD Follow up.    Specialty: Gastroenterology  Why: In basket message sent to clinic. Clinic to contact pt with appointment date and time.  Contact information:  [address] [address] [facility] [address]  [phone]               Ophelia Kramer MD Follow up in 2 week(s).    Specialty: Infectious Diseases  Contact information:  [address]  [address]  [facility] [address]  [phone]                           Patient Instructions:      Ambulatory referral/consult to Outpatient Case Management   Referral Priority: Routine Referral Type: Consultation   Referral Reason: Specialty Services Required   Number of Visits Requested: 1     Notify your health care provider if you experience any of the following:  increased confusion or weakness     Notify your health care provider if you experience any of the following:  persistent dizziness, light-headedness, or visual disturbances     Notify your health care provider if you experience any of the following:  worsening rash     Notify your health care provider if you experience any of the following:  severe persistent headache     Notify your health care provider if you experience any of the following:  difficulty breathing or increased cough     Notify your health care provider if you experience any of the following:  redness,  tenderness, or signs of infection (pain, swelling, redness, odor or green/yellow discharge around incision site)     Notify your health care provider if you experience any of the following:  severe uncontrolled pain     Notify your health care provider if you experience any of the following:  persistent nausea and vomiting or diarrhea     Notify your health care provider if you experience any of the following:  temperature >100.4     Activity as tolerated       Significant Diagnostic Studies: Labs: CMP   Recent Labs   Lab 01/08/25  0513 01/09/25  0432    138   K 3.9 3.7    107   CO2 26 25   GLU 93 88   BUN 7 6   CREATININE 1.1 1.0   CALCIUM 8.3* 8.2*   PROT 6.0 5.9*   ALBUMIN 3.1* 3.0*   BILITOT 0.3 0.3   ALKPHOS 52* 52*   AST 7* 7*   ALT 6* 5*   ANIONGAP 6* 6*    and CBC   Recent Labs   Lab 01/08/25 0513 01/09/25  0432   WBC 8.74 9.28   HGB 9.5* 9.4*   HCT 31.1* 31.6*    255       Pending Diagnostic Studies:       Procedure Component Value Units Date/Time    Calprotectin, Stool [[id number]] Collected: 01/08/25 1740    Order Status: Sent Lab Status: In process Updated: 01/08/25 1743    Specimen: Stool            Imaging Results              X-Ray Chest AP Portable (Final result)  Result time 12/31/24 18:42:01      Final result by Mirta Gayle MD (12/31/24 18:42:01)                   Impression:      No acute findings.      Electronically signed by: Mirta Gayle  Date:    12/31/2024  Time:    18:42               Narrative:    EXAMINATION:  XR CHEST AP PORTABLE    CLINICAL HISTORY:  leukocytosis;    TECHNIQUE:  Single frontal view of the chest was performed.    COMPARISON:  11/27/2024    FINDINGS:  Lungs are clear. No focal consolidation. No pleural effusion. No pneumothorax. Normal heart size.                                       CT Abdomen Pelvis With IV Contrast NO Oral Contrast (Final result)  Result time 12/31/24 15:14:55      Final result by Samuel Boyle MD (12/31/24  15:14:55)                   Impression:      1. CT findings compatible with acute colitis as above, slightly worsened when compared to December 22, 2024.  No evidence of perforation or pneumatosis.  2. Additional stable findings as above.      Electronically signed by: Samuel Boyle  Date:    12/31/2024  Time:    15:14               Narrative:    EXAMINATION:  CT ABDOMEN PELVIS WITH IV CONTRAST    CLINICAL HISTORY:  Abdominal pain, acute, nonlocalized;.    TECHNIQUE:  Post infusion axial images were obtained from the lung bases to the pubic symphysis 100 cc nonionic contrast was utilized for the examination.    COMPARISON:  December 22, 2024    FINDINGS:  The lung bases are unremarkable.    No hepatic mass or contour abnormality is identified.  The gallbladder is absent.  The biliary tree is nondilated.  The spleen, pancreas, and adrenal glands are normal.  Bilateral nonobstructing renal calculi are similar to the prior study, the largest of which is at the lower pole on the left measuring 7 mm.  There are no ureteral calculi and there is no hydronephrosis.  The abdominal aorta is normal in caliber.    Assessment of bowel structures reveals persistent mild abnormal circumferential wall thickening of the colon from the proximal transverse colon to the rectum.  Compared to the prior study, the degree of wall thickening has increased slightly, with slightly increasing pericolonic edema, most notably at the level of the sigmoid colon.  Findings are compatible with acute colitis.  There is no evidence of perforation, pneumatosis, or obstruction.  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    Images of the pelvis demonstrate a normal urinary bladder.  There is no drainable free fluid or lymphadenopathy.    No acute osseous abnormalities are identified.                                      Medications:  Reconciled Home Medications:      Medication List        START taking these medications       DIFICID 200 mg Tab  Generic drug: fidaxomicin  Take 1 tablet (200 mg total) by mouth 2 (two) times daily. for 9 days     diphenoxylate-atropine 2.5-0.025 mg 2.5-0.025 mg per tablet  Commonly known as: LOMOTIL  Take 1 tablet by mouth 4 (four) times daily as needed for Diarrhea.     HYDROcodone-acetaminophen  mg per tablet  Commonly known as: NORCO  Take 1 tablet by mouth every 6 (six) hours as needed for Pain.  Replaces: HYDROcodone-acetaminophen 7.5-325 mg per tablet     VSL#3 112.5 billion cell Cap  Generic drug: Lactobac 2-Bifido 1-S. therm  Take 1 capsule by mouth twice a day     ZINPLAVA 25 mg/mL Soln injection  Generic drug: bezlotoxumab  Inject 10 mg/kg intravenously single dose, during antibacterial treatment            CONTINUE taking these medications      budesonide 3 mg capsule  Commonly known as: ENTOCORT EC  Take 3 capsules (9 mg total) by mouth once daily.     busPIRone 15 MG tablet  Commonly known as: BUSPAR  Take 1 tablet (15 mg total) by mouth 3 (three) times daily.     colchicine 0.6 mg tablet  Commonly known as: COLCRYS  Take 1 tablet (0.6 mg total) by mouth 2 (two) times daily.     EScitalopram oxalate 20 MG tablet  Commonly known as: LEXAPRO  Take 1 tablet (20 mg total) by mouth once daily.     hyoscyamine 0.375 mg Tb12  Commonly known as: Levbid  Take 0.375 mg by mouth 2 (two) times daily.     LORazepam 0.5 MG tablet  Commonly known as: ATIVAN  Take 1 tablet (0.5 mg total) by mouth every 6 (six) hours as needed for Anxiety.     magnesium oxide 400 mg (241.3 mg magnesium) tablet  Commonly known as: MAG-OX  Take 1 tablet (400 mg total) by mouth 2 (two) times daily.     metoclopramide HCl 5 MG tablet  Commonly known as: REGLAN  Take 1 tablet (5 mg total) by mouth every 8 (eight) hours as needed (take as needed before meals).     metoprolol succinate 50 MG 24 hr tablet  Commonly known as: TOPROL-XL  Take 1 tablet (50 mg total) by mouth once daily.     pantoprazole 40 MG tablet  Commonly  known as: PROTONIX  Take 40 mg by mouth 2 (two) times daily.     promethazine 25 MG tablet  Commonly known as: PHENERGAN  Take 25 mg by mouth 4 (four) times daily as needed for Nausea.     rivaroxaban 20 mg Tab  Commonly known as: XARELTO  Take 1 tablet (20 mg total) by mouth daily with dinner or evening meal.     simvastatin 20 MG tablet  Commonly known as: ZOCOR  Take 1 tablet (20 mg total) by mouth every evening.     zolpidem 10 mg Tab  Commonly known as: AMBIEN  Take 1 tablet (10 mg total) by mouth nightly as needed (insomnia).            STOP taking these medications      HYDROcodone-acetaminophen 7.5-325 mg per tablet  Commonly known as: NORCO  Replaced by: HYDROcodone-acetaminophen  mg per tablet              Indwelling Lines/Drains at time of discharge:   Lines/Drains/Airways       None                   Time spent on the discharge of patient: 35 minutes         Adrian Estrada DO  Department of Hospital Medicine  [facility]

## 2025-01-09 NOTE — CONSULTS
[facility]  Adult Nutrition  Education Short Note      Nutrition Education    Previous education: yes    Diet at home: regular diet    Written information provided and explained on  Ulcerative colitis   diet. Pt states he has been dealing with this for over 2 years. Reviewed reducing intake of fatty foods. Also reviewed increasing protein intake to improve nutritional status. Pt with low prealbumin level ( 9.6). Provided coupons for supplements, and encourage low calorie, high protein drink.     Discussed with: patient    Educational Need? yes    Barriers: none identified    Interventions: General healthful diet and Medical Food Supplement Therapy    Patient and/or family comprehend instructions: yes    Outcome: Verbalizes understanding     Thanks for the consult!    Maria Eugenia Pfeiffer RD 01/09/2025 3:17 PM

## 2025-01-10 DIAGNOSIS — A49.8 RECURRENT CLOSTRIDIOIDES DIFFICILE INFECTION: Primary | ICD-10-CM

## 2025-01-10 RX ORDER — SODIUM CHLORIDE 0.9 % (FLUSH) 0.9 %
10 SYRINGE (ML) INJECTION
OUTPATIENT
Start: 2025-01-10

## 2025-01-10 RX ORDER — HEPARIN 100 UNIT/ML
500 SYRINGE INTRAVENOUS
OUTPATIENT
Start: 2025-01-10

## 2025-01-13 ENCOUNTER — DOCUMENT SCAN (OUTPATIENT)
Dept: HOME HEALTH SERVICES | Facility: HOSPITAL | Age: 50
End: 2025-01-13
Payer: COMMERCIAL

## 2025-01-13 PROCEDURE — 99499 UNLISTED E&M SERVICE: CPT | Mod: ,,, | Performed by: FAMILY MEDICINE

## 2025-01-13 NOTE — TELEPHONE ENCOUNTER
----- Message from Isabel sent at 1/13/2025  1:56 PM CST -----  Type:  RX Refill Request    Who Called:  pt    Refill or New Rx: refill     RX Name and Strength:rivaroxaban (XARELTO) 20 mg Tab    How is the patient currently taking it? (ex. 1XDay): as directed    Is this a 30 day or 90 day RX: 90     Preferred Pharmacy with phone number:     [facility] - [place], [place] - [address]  [address] [facility] [address]  Phone: [phone] Fax: [phone]    Local or Mail Order: mail order    Ordering Provider: Mynor    Would the patient rather a call back or a response via [facility]?  Call if questions    Best Call Back Number: [phone]    Additional Information: please refill script    Thanks

## 2025-01-14 ENCOUNTER — TELEPHONE (OUTPATIENT)
Dept: FAMILY MEDICINE | Facility: CLINIC | Age: 50
End: 2025-01-14
Payer: COMMERCIAL

## 2025-01-14 LAB — CALPROTECTIN STL-MCNT: 5480 UG/G (ref 0–120)

## 2025-01-14 NOTE — TELEPHONE ENCOUNTER
Refill Routing Note   Medication(s) are not appropriate for processing by [facility] for the following reason(s):        Outside of protocol    [facility] action(s):  Route               Appointments  past 12m or future 3m with PCP    Date Provider   Last Visit   12/27/2024 Hunter Aguilar III, MD   Next Visit   1/28/2025 Hunter Aguilar III, MD   ED visits in past 90 days: 1        Note composed:7:30 PM 01/13/2025

## 2025-01-14 NOTE — TELEPHONE ENCOUNTER
"Discharge Information     Discharge Date:  1/09/2025          Primary Discharge Diagnosis:  c-diff          How patient is feeling since discharge from the hospital?  States he is feeling better and would not schedule sooner          Medication & Order Review     Discharge Medication Review:    Medication reconciliation performed No   If no, state reason why not performed: N/A       Did patient have any difficulty/problems filling prescriptions?  No           If yes, state reason and steps taken to assist in resolving issue: N/A     Does patient have any questions regarding medications?  No           If yes, state question and steps taken to answer question:  N/A    Was Home Health and/or any equipment ordered for patient upon discharge?  No          Home Health No   If yes, has home health contacted patient and/or initiated services? N/A   Name of Home Health Agency N/A   Durable Medical Equipment (DME)  No (Example: walker, wheelchair, nebulizer)   If yes, has the DME provider contacted patient and delivered equipment? N/A    DME Company N/A     Red Flag Review     Was patient educated on "red flags" or things to watch for?  No       If yes:  "Red flags" patient was told to watch for:                                                   Other:              Is patient experiencing any red flags today (or any of these symptoms) (List examples of red flags specifically)?  No       Notes:                      If no:    Educated the patient on 3 "red flags" to watch for:                                                   Other:                  Is patient experiencing any red flags today (or any of these symptoms) (List examples of red flags specifically)?  No      Notes:        Patient Education & Follow Up               Phone number patient will call if having any questions or problems:  Patient was able to state the phone number for [facility] accurately.    Appointment scheduled?  Yes      Follow-up/transition of " care appointment, including the date/time and location of your appointment:  Patient was able to state the follow-up appointment correctly.        Notes:            Provided patient with date, time and location of follow-up appointment if they do not have it:       Rescheduled transition of care appointment if the patient has a conflict:    Appointment re-scheduled?  No        Patient informed of this appointment?  No      Notes:            3 questions patient would like to ask physician during appointment:

## 2025-01-15 ENCOUNTER — OFFICE VISIT (OUTPATIENT)
Dept: WOUND CARE | Facility: HOSPITAL | Age: 50
End: 2025-01-15
Attending: FAMILY MEDICINE
Payer: COMMERCIAL

## 2025-01-15 ENCOUNTER — HOSPITAL ENCOUNTER (INPATIENT)
Facility: HOSPITAL | Age: 50
LOS: 2 days | Discharge: HOME | DRG: 386 | End: 2025-01-19
Attending: EMERGENCY MEDICINE | Admitting: FAMILY MEDICINE
Payer: COMMERCIAL

## 2025-01-15 VITALS
HEART RATE: 82 BPM | SYSTOLIC BLOOD PRESSURE: 141 MMHG | RESPIRATION RATE: 18 BRPM | TEMPERATURE: 98 F | DIASTOLIC BLOOD PRESSURE: 82 MMHG

## 2025-01-15 DIAGNOSIS — A04.72 C. DIFFICILE COLITIS: ICD-10-CM

## 2025-01-15 DIAGNOSIS — R11.2 NAUSEA AND VOMITING, UNSPECIFIED VOMITING TYPE: ICD-10-CM

## 2025-01-15 DIAGNOSIS — L02.818 CUTANEOUS ABSCESS OF OTHER SITE: Primary | ICD-10-CM

## 2025-01-15 DIAGNOSIS — E86.0 DEHYDRATION: Primary | ICD-10-CM

## 2025-01-15 DIAGNOSIS — S41.102D OPEN WOUND OF LEFT UPPER ARM, SUBSEQUENT ENCOUNTER: ICD-10-CM

## 2025-01-15 DIAGNOSIS — K52.9 COLITIS: ICD-10-CM

## 2025-01-15 DIAGNOSIS — R07.9 CHEST PAIN: ICD-10-CM

## 2025-01-15 DIAGNOSIS — K51.90 ULCERATIVE COLITIS WITHOUT COMPLICATIONS, UNSPECIFIED LOCATION: ICD-10-CM

## 2025-01-15 LAB
ALBUMIN SERPL BCP-MCNC: 3.7 G/DL (ref 3.5–5.2)
ALP SERPL-CCNC: 75 U/L (ref 55–135)
ALT SERPL W/O P-5'-P-CCNC: 8 U/L (ref 10–44)
ANION GAP SERPL CALC-SCNC: 6 MMOL/L (ref 8–16)
AST SERPL-CCNC: 10 U/L (ref 10–40)
BASOPHILS # BLD AUTO: 0.11 K/UL (ref 0–0.2)
BASOPHILS NFR BLD: 0.9 % (ref 0–1.9)
BILIRUB SERPL-MCNC: 0.3 MG/DL (ref 0.1–1)
BILIRUB UR QL STRIP: NEGATIVE
BUN SERPL-MCNC: 10 MG/DL (ref 6–20)
CALCIUM SERPL-MCNC: 9 MG/DL (ref 8.7–10.5)
CHLORIDE SERPL-SCNC: 103 MMOL/L (ref 95–110)
CLARITY UR: CLEAR
CO2 SERPL-SCNC: 26 MMOL/L (ref 23–29)
COLOR UR: YELLOW
CREAT SERPL-MCNC: 1.1 MG/DL (ref 0.5–1.4)
DIFFERENTIAL METHOD BLD: ABNORMAL
EOSINOPHIL # BLD AUTO: 0.7 K/UL (ref 0–0.5)
EOSINOPHIL NFR BLD: 5.8 % (ref 0–8)
ERYTHROCYTE [DISTWIDTH] IN BLOOD BY AUTOMATED COUNT: 17.4 % (ref 11.5–14.5)
EST. GFR  (NO RACE VARIABLE): >60 ML/MIN/1.73 M^2
GLUCOSE SERPL-MCNC: 90 MG/DL (ref 70–110)
GLUCOSE UR QL STRIP: NEGATIVE
HCT VFR BLD AUTO: 40.3 % (ref 40–54)
HGB BLD-MCNC: 12.3 G/DL (ref 14–18)
HGB UR QL STRIP: NEGATIVE
IMM GRANULOCYTES # BLD AUTO: 0.05 K/UL (ref 0–0.04)
IMM GRANULOCYTES NFR BLD AUTO: 0.4 % (ref 0–0.5)
KETONES UR QL STRIP: NEGATIVE
LEUKOCYTE ESTERASE UR QL STRIP: NEGATIVE
LIPASE SERPL-CCNC: 6 U/L (ref 4–60)
LYMPHOCYTES # BLD AUTO: 1.7 K/UL (ref 1–4.8)
LYMPHOCYTES NFR BLD: 14.1 % (ref 18–48)
MCH RBC QN AUTO: 26.5 PG (ref 27–31)
MCHC RBC AUTO-ENTMCNC: 30.5 G/DL (ref 32–36)
MCV RBC AUTO: 87 FL (ref 82–98)
MONOCYTES # BLD AUTO: 1 K/UL (ref 0.3–1)
MONOCYTES NFR BLD: 8 % (ref 4–15)
NEUTROPHILS # BLD AUTO: 8.4 K/UL (ref 1.8–7.7)
NEUTROPHILS NFR BLD: 70.8 % (ref 38–73)
NITRITE UR QL STRIP: NEGATIVE
NRBC BLD-RTO: 0 /100 WBC
PH UR STRIP: 6 [PH] (ref 5–8)
PLATELET # BLD AUTO: 412 K/UL (ref 150–450)
PMV BLD AUTO: 9.8 FL (ref 9.2–12.9)
POTASSIUM SERPL-SCNC: 4.5 MMOL/L (ref 3.5–5.1)
PROT SERPL-MCNC: 7.6 G/DL (ref 6–8.4)
PROT UR QL STRIP: NEGATIVE
RBC # BLD AUTO: 4.65 M/UL (ref 4.6–6.2)
SODIUM SERPL-SCNC: 135 MMOL/L (ref 136–145)
SP GR UR STRIP: 1.02 (ref 1–1.03)
URN SPEC COLLECT METH UR: NORMAL
UROBILINOGEN UR STRIP-ACNC: NEGATIVE EU/DL
WBC # BLD AUTO: 11.82 K/UL (ref 3.9–12.7)

## 2025-01-15 PROCEDURE — 96375 TX/PRO/DX INJ NEW DRUG ADDON: CPT

## 2025-01-15 PROCEDURE — 3079F DIAST BP 80-89 MM HG: CPT | Mod: CPTII,,, | Performed by: FAMILY MEDICINE

## 2025-01-15 PROCEDURE — 1111F DSCHRG MED/CURRENT MED MERGE: CPT | Mod: CPTII,,, | Performed by: FAMILY MEDICINE

## 2025-01-15 PROCEDURE — 96361 HYDRATE IV INFUSION ADD-ON: CPT

## 2025-01-15 PROCEDURE — G0378 HOSPITAL OBSERVATION PER HR: HCPCS

## 2025-01-15 PROCEDURE — 83690 ASSAY OF LIPASE: CPT | Performed by: PHYSICIAN ASSISTANT

## 2025-01-15 PROCEDURE — 96374 THER/PROPH/DIAG INJ IV PUSH: CPT

## 2025-01-15 PROCEDURE — 3072F LOW RISK FOR RETINOPATHY: CPT | Mod: CPTII,,, | Performed by: FAMILY MEDICINE

## 2025-01-15 PROCEDURE — 1159F MED LIST DOCD IN RCRD: CPT | Mod: CPTII,,, | Performed by: FAMILY MEDICINE

## 2025-01-15 PROCEDURE — 1160F RVW MEDS BY RX/DR IN RCRD: CPT | Mod: CPTII,,, | Performed by: FAMILY MEDICINE

## 2025-01-15 PROCEDURE — 99212 OFFICE O/P EST SF 10 MIN: CPT | Mod: ,,, | Performed by: FAMILY MEDICINE

## 2025-01-15 PROCEDURE — 96365 THER/PROPH/DIAG IV INF INIT: CPT

## 2025-01-15 PROCEDURE — 99213 OFFICE O/P EST LOW 20 MIN: CPT | Mod: PN | Performed by: FAMILY MEDICINE

## 2025-01-15 PROCEDURE — 3077F SYST BP >= 140 MM HG: CPT | Mod: CPTII,,, | Performed by: FAMILY MEDICINE

## 2025-01-15 PROCEDURE — 99285 EMERGENCY DEPT VISIT HI MDM: CPT | Mod: 25

## 2025-01-15 PROCEDURE — 81003 URINALYSIS AUTO W/O SCOPE: CPT | Performed by: PHYSICIAN ASSISTANT

## 2025-01-15 PROCEDURE — 63600175 PHARM REV CODE 636 W HCPCS: Performed by: EMERGENCY MEDICINE

## 2025-01-15 PROCEDURE — 63600175 PHARM REV CODE 636 W HCPCS: Performed by: INTERNAL MEDICINE

## 2025-01-15 PROCEDURE — 85025 COMPLETE CBC W/AUTO DIFF WBC: CPT | Performed by: PHYSICIAN ASSISTANT

## 2025-01-15 PROCEDURE — 96376 TX/PRO/DX INJ SAME DRUG ADON: CPT

## 2025-01-15 PROCEDURE — 25000003 PHARM REV CODE 250: Performed by: INTERNAL MEDICINE

## 2025-01-15 PROCEDURE — 80053 COMPREHEN METABOLIC PANEL: CPT | Performed by: PHYSICIAN ASSISTANT

## 2025-01-15 RX ORDER — ACETAMINOPHEN 325 MG/1
650 TABLET ORAL EVERY 4 HOURS PRN
Status: DISCONTINUED | OUTPATIENT
Start: 2025-01-15 | End: 2025-01-19 | Stop reason: HOSPADM

## 2025-01-15 RX ORDER — IBUPROFEN 200 MG
24 TABLET ORAL
Status: DISCONTINUED | OUTPATIENT
Start: 2025-01-15 | End: 2025-01-19 | Stop reason: HOSPADM

## 2025-01-15 RX ORDER — IBUPROFEN 200 MG
16 TABLET ORAL
Status: DISCONTINUED | OUTPATIENT
Start: 2025-01-15 | End: 2025-01-19 | Stop reason: HOSPADM

## 2025-01-15 RX ORDER — HYDROMORPHONE HYDROCHLORIDE 1 MG/ML
1 INJECTION, SOLUTION INTRAMUSCULAR; INTRAVENOUS; SUBCUTANEOUS
Status: COMPLETED | OUTPATIENT
Start: 2025-01-15 | End: 2025-01-15

## 2025-01-15 RX ORDER — NALOXONE HCL 0.4 MG/ML
0.02 VIAL (ML) INJECTION
Status: DISCONTINUED | OUTPATIENT
Start: 2025-01-15 | End: 2025-01-19 | Stop reason: HOSPADM

## 2025-01-15 RX ORDER — ESCITALOPRAM OXALATE 10 MG/1
20 TABLET ORAL DAILY
Status: DISCONTINUED | OUTPATIENT
Start: 2025-01-16 | End: 2025-01-19 | Stop reason: HOSPADM

## 2025-01-15 RX ORDER — METRONIDAZOLE 500 MG/100ML
500 INJECTION, SOLUTION INTRAVENOUS
Status: DISCONTINUED | OUTPATIENT
Start: 2025-01-15 | End: 2025-01-16

## 2025-01-15 RX ORDER — ONDANSETRON HYDROCHLORIDE 2 MG/ML
8 INJECTION, SOLUTION INTRAVENOUS
Status: COMPLETED | OUTPATIENT
Start: 2025-01-15 | End: 2025-01-15

## 2025-01-15 RX ORDER — BUSPIRONE HYDROCHLORIDE 5 MG/1
15 TABLET ORAL 3 TIMES DAILY
Status: DISCONTINUED | OUTPATIENT
Start: 2025-01-15 | End: 2025-01-19 | Stop reason: HOSPADM

## 2025-01-15 RX ORDER — INSULIN ASPART 100 [IU]/ML
0-10 INJECTION, SOLUTION INTRAVENOUS; SUBCUTANEOUS
Status: DISCONTINUED | OUTPATIENT
Start: 2025-01-15 | End: 2025-01-19 | Stop reason: HOSPADM

## 2025-01-15 RX ORDER — HYDROCODONE BITARTRATE AND ACETAMINOPHEN 10; 325 MG/1; MG/1
1 TABLET ORAL EVERY 6 HOURS PRN
Status: DISCONTINUED | OUTPATIENT
Start: 2025-01-15 | End: 2025-01-18

## 2025-01-15 RX ORDER — ONDANSETRON HYDROCHLORIDE 2 MG/ML
4 INJECTION, SOLUTION INTRAVENOUS EVERY 6 HOURS PRN
Status: DISCONTINUED | OUTPATIENT
Start: 2025-01-15 | End: 2025-01-19 | Stop reason: HOSPADM

## 2025-01-15 RX ORDER — TALC
6 POWDER (GRAM) TOPICAL NIGHTLY PRN
Status: DISCONTINUED | OUTPATIENT
Start: 2025-01-15 | End: 2025-01-19 | Stop reason: HOSPADM

## 2025-01-15 RX ORDER — ACETAMINOPHEN 325 MG/1
650 TABLET ORAL EVERY 8 HOURS PRN
Status: DISCONTINUED | OUTPATIENT
Start: 2025-01-15 | End: 2025-01-19 | Stop reason: HOSPADM

## 2025-01-15 RX ORDER — SODIUM,POTASSIUM PHOSPHATES 280-250MG
2 POWDER IN PACKET (EA) ORAL
Status: DISCONTINUED | OUTPATIENT
Start: 2025-01-15 | End: 2025-01-19 | Stop reason: HOSPADM

## 2025-01-15 RX ORDER — BUDESONIDE 3 MG/1
9 CAPSULE, COATED PELLETS ORAL DAILY
Status: DISCONTINUED | OUTPATIENT
Start: 2025-01-16 | End: 2025-01-15

## 2025-01-15 RX ORDER — SODIUM CHLORIDE 9 MG/ML
INJECTION, SOLUTION INTRAVENOUS CONTINUOUS
Status: DISCONTINUED | OUTPATIENT
Start: 2025-01-15 | End: 2025-01-18

## 2025-01-15 RX ORDER — GLUCAGON 1 MG
1 KIT INJECTION
Status: DISCONTINUED | OUTPATIENT
Start: 2025-01-15 | End: 2025-01-19 | Stop reason: HOSPADM

## 2025-01-15 RX ORDER — COLCHICINE 0.6 MG/1
0.6 TABLET, FILM COATED ORAL 2 TIMES DAILY
Status: DISCONTINUED | OUTPATIENT
Start: 2025-01-15 | End: 2025-01-19 | Stop reason: HOSPADM

## 2025-01-15 RX ORDER — LANOLIN ALCOHOL/MO/W.PET/CERES
800 CREAM (GRAM) TOPICAL
Status: DISCONTINUED | OUTPATIENT
Start: 2025-01-15 | End: 2025-01-19 | Stop reason: HOSPADM

## 2025-01-15 RX ORDER — LORAZEPAM 0.5 MG/1
0.5 TABLET ORAL EVERY 6 HOURS PRN
Status: DISCONTINUED | OUTPATIENT
Start: 2025-01-15 | End: 2025-01-19 | Stop reason: HOSPADM

## 2025-01-15 RX ORDER — METOPROLOL SUCCINATE 50 MG/1
50 TABLET, EXTENDED RELEASE ORAL DAILY
Status: DISCONTINUED | OUTPATIENT
Start: 2025-01-16 | End: 2025-01-19 | Stop reason: HOSPADM

## 2025-01-15 RX ORDER — KETOROLAC TROMETHAMINE 30 MG/ML
15 INJECTION, SOLUTION INTRAMUSCULAR; INTRAVENOUS EVERY 6 HOURS PRN
Status: DISPENSED | OUTPATIENT
Start: 2025-01-16 | End: 2025-01-18

## 2025-01-15 RX ORDER — ZOLPIDEM TARTRATE 5 MG/1
10 TABLET ORAL NIGHTLY PRN
Status: DISCONTINUED | OUTPATIENT
Start: 2025-01-15 | End: 2025-01-19 | Stop reason: HOSPADM

## 2025-01-15 RX ORDER — ALUMINUM HYDROXIDE, MAGNESIUM HYDROXIDE, AND SIMETHICONE 1200; 120; 1200 MG/30ML; MG/30ML; MG/30ML
30 SUSPENSION ORAL 4 TIMES DAILY PRN
Status: DISCONTINUED | OUTPATIENT
Start: 2025-01-15 | End: 2025-01-19 | Stop reason: HOSPADM

## 2025-01-15 RX ORDER — FAMOTIDINE 20 MG/1
20 TABLET, FILM COATED ORAL 2 TIMES DAILY
Status: DISCONTINUED | OUTPATIENT
Start: 2025-01-15 | End: 2025-01-19 | Stop reason: HOSPADM

## 2025-01-15 RX ADMIN — Medication 6 MG: at 08:01

## 2025-01-15 RX ADMIN — METHYLPREDNISOLONE SODIUM SUCCINATE 60 MG: 40 INJECTION, POWDER, FOR SOLUTION INTRAMUSCULAR; INTRAVENOUS at 09:01

## 2025-01-15 RX ADMIN — FIDAXOMICIN 200 MG: 200 TABLET, FILM COATED ORAL at 09:01

## 2025-01-15 RX ADMIN — SODIUM CHLORIDE, POTASSIUM CHLORIDE, SODIUM LACTATE AND CALCIUM CHLORIDE 1000 ML: 600; 310; 30; 20 INJECTION, SOLUTION INTRAVENOUS at 05:01

## 2025-01-15 RX ADMIN — HYDROMORPHONE HYDROCHLORIDE 1 MG: 1 INJECTION, SOLUTION INTRAMUSCULAR; INTRAVENOUS; SUBCUTANEOUS at 05:01

## 2025-01-15 RX ADMIN — SODIUM CHLORIDE: 9 INJECTION, SOLUTION INTRAVENOUS at 07:01

## 2025-01-15 RX ADMIN — RIVAROXABAN 20 MG: 20 TABLET, FILM COATED ORAL at 08:01

## 2025-01-15 RX ADMIN — BUSPIRONE HYDROCHLORIDE 15 MG: 5 TABLET ORAL at 08:01

## 2025-01-15 RX ADMIN — METRONIDAZOLE 500 MG: 500 INJECTION, SOLUTION INTRAVENOUS at 08:01

## 2025-01-15 RX ADMIN — ZOLPIDEM TARTRATE 10 MG: 5 TABLET, FILM COATED ORAL at 08:01

## 2025-01-15 RX ADMIN — ONDANSETRON 4 MG: 2 INJECTION INTRAMUSCULAR; INTRAVENOUS at 11:01

## 2025-01-15 RX ADMIN — ONDANSETRON 8 MG: 2 INJECTION INTRAMUSCULAR; INTRAVENOUS at 05:01

## 2025-01-15 RX ADMIN — COLCHICINE 0.6 MG: 0.6 TABLET, FILM COATED ORAL at 09:01

## 2025-01-15 RX ADMIN — HYDROCODONE BITARTRATE AND ACETAMINOPHEN 1 TABLET: 10; 325 TABLET ORAL at 09:01

## 2025-01-15 NOTE — PROGRESS NOTES
"          Wound Care Progress Note    Subjective:       Patient ID: Jacoby Jean-Baptiste is a 50 y.o. male.    Chief Complaint: Wound Care    HPI  Pt seen in clinic for a FU visit for a left arm open surgical wound. Wound is healed. No new complaints today  Review of Systems   Skin:  Positive for wound.        Healed left arm open wound   All other systems reviewed and are negative.      Objective:        Physical Exam  Vitals and nursing note reviewed.   Constitutional:       General: He is not in acute distress.     Appearance: Normal appearance.   Skin:     General: Skin is warm and dry.      Findings: Lesion present.      Comments: Left arm open wound healed, see wound care assessment documentation in chart review scanned under the media tab   Neurological:      General: No focal deficit present.      Mental Status: He is alert.   Psychiatric:         Judgment: Judgment normal.       Vitals:    01/15/25 0931   BP: (!) 141/82   Pulse: 82   Resp: 18   Temp: 98 °F (36.7 °C)       Assessment:           ICD-10-CM ICD-9-CM   1. Cutaneous abscess of other site  L02.818 682.8   2. Open wound of left upper arm, subsequent encounter  S41.102D V58.89     880.03                Plan:                  Jacoby Waters" was seen today for wound care.    Diagnoses and all orders for this visit:    Cutaneous abscess of other site    Open wound of left upper arm, subsequent encounter        Much of the documentation for this visit was completed in the [facility] system. Please see the attached documentation for further details about the patient's care. Scanned under Media tab.           "

## 2025-01-15 NOTE — HPI
50 year old pt getting admitted with C diff colitis and possible UC flare  Pt was in hospital recently with C diff colitis and went home with dificid  Pt is on steroids for UC  He did well after discharge  Later started having abdominal pain all over/crampy associated with nausea  This was followed by vomiting episodes and  weakness  He came back to ER and got admitted

## 2025-01-15 NOTE — FIRST PROVIDER EVALUATION
Emergency Department TeleTriage Encounter Note      CHIEF COMPLAINT    Chief Complaint   Patient presents with    Nausea    Vomiting    Diarrhea     States he has had N/V that began last night.  Diagnosed with C Diff 1/6/25 hx of UC       VITAL SIGNS   Initial Vitals [01/15/25 1346]   BP Pulse Resp Temp SpO2   (!) 151/82 92 18 98.7 °F (37.1 °C) 100 %      MAP       --            ALLERGIES    Review of patient's allergies indicates:  No Known Allergies    PROVIDER TRIAGE NOTE  This is a teletriage evaluation of a 50 y.o. male presenting to the ED complaining of nausea, vomiting, and diarrhea. Patient reports nausea, vomiting, and diarrhea that started again last night. Patient diagnosed with C diff 2 weeks ago and has a history of UC.    Patient is alert and oriented. He speaks in complete sentences. He is sitting upright in the chair in no distress.     Initial orders will be placed and care will be transferred to an alternate provider when patient is roomed for a full evaluation. Any additional orders and the final disposition will be determined by that provider.         ORDERS  Labs Reviewed   CBC W/ AUTO DIFFERENTIAL   COMPREHENSIVE METABOLIC PANEL   LIPASE   URINALYSIS, REFLEX TO URINE CULTURE       ED Orders (720h ago, onward)      Start Ordered     Status Ordering Provider    01/15/25 1407 01/15/25 1406  Vital signs  Every 2 hours         Ordered RUFINA, CLAUDIA G.    01/15/25 1406 01/15/25 1406  Diet NPO  Diet effective now         Ordered RUFINA, CLAUDIA G.    01/15/25 1406 01/15/25 1406  Insert peripheral IV  Once         Ordered RUFINA, CLAUDIA G.    01/15/25 1406 01/15/25 1406  CBC W/ AUTO DIFFERENTIAL  STAT         Ordered RUFINA, CLAUDIA G.    01/15/25 1406 01/15/25 1406  Comp. Metabolic Panel  STAT         Ordered RUFINA, CLAUDIA G.    01/15/25 1406 01/15/25 1406  Lipase  STAT         Ordered RUFINA, CLAUDIA G.    01/15/25 1406 01/15/25 1406  Urinalysis, Reflex to Urine Culture Urine, Clean Catch  STAT         Ordered  CLAUDIA ALMARAZ              Virtual Visit Note: The provider triage portion of this emergency department evaluation and documentation was performed via [employer], a HIPAA-compliant telemedicine application, in concert with a tele-presenter in the room. A face to face patient evaluation with one of my colleagues will occur once the patient is placed in an emergency department room.      DISCLAIMER: This note was prepared with [employer] voice recognition transcription software. Garbled syntax, mangled pronouns, and other bizarre constructions may be attributed to that software system.

## 2025-01-15 NOTE — ED PROVIDER NOTES
Encounter Date: 1/15/2025       History     Chief Complaint   Patient presents with    Nausea    Vomiting    Diarrhea     States he has had N/V that began last night.  Diagnosed with C Diff 1/6/25 hx of UC     50-year-old male recently diagnosed of C diff colitis and was admitted in the hospital and treated.  Patient now is having nausea vomiting and diarrhea and having abdominal cramps and has worsening symptoms so presented here.  Denies fever or chills.  Denies dysuria or hematuria.  Patient has history of ulcerative colitis as well.  Denies any focal weakness or numbness however has generalized malaise and fatigue and abdominal cramps and generalized weakness      Review of patient's allergies indicates:  No Known Allergies  Past Medical History:   Diagnosis Date    C. difficile colitis     Cavitary pneumonia 11/2023    pos aspergillus serology    Diabetes mellitus 01/2022    Hyperlipidemia 2015    Hypertension 2015    Insomnia 2015    Ulcerative colitis      Past Surgical History:   Procedure Laterality Date    BRONCHOSCOPY WITH FLUOROSCOPY Left 11/20/2023    Procedure: BRONCHOSCOPY, WITH FLUOROSCOPY;  Surgeon: Lisa Villarreal MD;  Location: [facility];  Service: Pulmonary;  Laterality: Left;    BRONCHOSCOPY WITH FLUOROSCOPY N/A 11/30/2023    Procedure: BRONCHOSCOPY, WITH FLUOROSCOPY;  Surgeon: Lisa Villarreal MD;  Location: [facility];  Service: Pulmonary;  Laterality: N/A;    CARDIAC ELECTROPHYSIOLOGY MAPPING AND ABLATION  2017    SVT    CHOLECYSTECTOMY  2011    COLONOSCOPY N/A 5/3/2022    Procedure: COLONOSCOPY;  Surgeon: Shan Jean III, MD;  Location: [facility];  Service: Endoscopy;  Laterality: N/A;    COLONOSCOPY N/A 3/16/2024    Procedure: COLONOSCOPY;  Surgeon: ALEKSANDER Ramos MD;  Location: [facility];  Service: Endoscopy;  Laterality: N/A;    COLONOSCOPY WITH FECAL MICROBIOTA TRANSFER N/A 3/21/2023    Procedure: COLONOSCOPY, WITH FECAL MICROBIOTA TRANSFER;  Surgeon: David Millan  MD;  Location: [facility];  Service: Endoscopy;  Laterality: N/A;    ESOPHAGOGASTRODUODENOSCOPY N/A 4/24/2023    Procedure: EGD (ESOPHAGOGASTRODUODENOSCOPY);  Surgeon: Shan Jean III, MD;  Location: [facility];  Service: Endoscopy;  Laterality: N/A;    ESOPHAGOGASTRODUODENOSCOPY N/A 6/5/2024    Procedure: EGD (ESOPHAGOGASTRODUODENOSCOPY);  Surgeon: Odalis Mccabe MD;  Location: [facility];  Service: Endoscopy;  Laterality: N/A;    FLEXIBLE SIGMOIDOSCOPY N/A 6/5/2024    Procedure: SIGMOIDOSCOPY, FLEXIBLE;  Surgeon: Odalis Mccabe MD;  Location: [facility];  Service: Endoscopy;  Laterality: N/A;    FLEXIBLE SIGMOIDOSCOPY N/A 7/16/2024    Procedure: SIGMOIDOSCOPY, FLEXIBLE;  Surgeon: Shan Jean III, MD;  Location: [facility];  Service: Endoscopy;  Laterality: N/A;    FLEXIBLE SIGMOIDOSCOPY N/A 8/13/2024    Procedure: SIGMOIDOSCOPY, FLEXIBLE;  Surgeon: Shan Jean III, MD;  Location: [facility];  Service: Endoscopy;  Laterality: N/A;    GANGLION CYST EXCISION Left 1992    UMBILICAL HERNIA REPAIR  2011    with mesh     Family History   Problem Relation Name Age of Onset    Asthma Mother       Social History     Tobacco Use    Smoking status: Former     Average packs/day: 1 pack/day for 30.0 years (30.0 ttl pk-yrs)     Types: Cigarettes     Start date: 1993    Smokeless tobacco: Never    Tobacco comments:     Pt states he has stopped smoking with Chantix three weeks ago. 12/1/23   Substance Use Topics    Alcohol use: Yes     Comment: ocass    Drug use: Not Currently     Review of Systems   Constitutional: Negative.    HENT: Negative.     Eyes: Negative.    Respiratory: Negative.     Cardiovascular: Negative.    Gastrointestinal:  Positive for abdominal pain, diarrhea, nausea and vomiting. Negative for blood in stool, constipation and rectal pain.   Endocrine: Negative.    Genitourinary: Negative.    Musculoskeletal: Negative.    Skin: Negative.    Allergic/Immunologic: Negative.     Neurological: Negative.    Hematological: Negative.    Psychiatric/Behavioral:  Negative for agitation.    All other systems reviewed and are negative.      Physical Exam     Initial Vitals [01/15/25 1346]   BP Pulse Resp Temp SpO2   (!) 151/82 92 18 98.7 °F (37.1 °C) 100 %      MAP       --         Physical Exam    Nursing note and vitals reviewed.  Constitutional: He appears well-developed and well-nourished.   HENT:   Head: Normocephalic and atraumatic.   Nose: Nose normal.   Eyes: Conjunctivae and EOM are normal.   Neck: No tracheal deviation present.   Normal range of motion.  Cardiovascular:  Normal rate, regular rhythm, normal heart sounds and intact distal pulses.     Exam reveals no friction rub.       No murmur heard.  Pulmonary/Chest: Breath sounds normal. No respiratory distress. He has no wheezes. He has no rales.   Abdominal: Abdomen is soft. He exhibits no distension. There is abdominal tenderness.   Left lower abdominal tenderness   Musculoskeletal:         General: Normal range of motion.      Cervical back: Normal range of motion.     Neurological: He is alert and oriented to person, place, and time. He has normal strength. GCS score is 15. GCS eye subscore is 4. GCS verbal subscore is 5. GCS motor subscore is 6.   Skin: Skin is warm and dry. Capillary refill takes less than 2 seconds.   Psychiatric: He has a normal mood and affect. Thought content normal.         ED Course   Procedures  Labs Reviewed   CBC W/ AUTO DIFFERENTIAL - Abnormal       Result Value    WBC 11.82      RBC 4.65      Hemoglobin 12.3 (*)     Hematocrit 40.3      MCV 87      MCH 26.5 (*)     MCHC 30.5 (*)     RDW 17.4 (*)     Platelets 412      MPV 9.8      Immature Granulocytes 0.4      Gran # (ANC) 8.4 (*)     Immature Grans (Abs) 0.05 (*)     Lymph # 1.7      Mono # 1.0      Eos # 0.7 (*)     Baso # 0.11      nRBC 0      Gran % 70.8      Lymph % 14.1 (*)     Mono % 8.0      Eosinophil % 5.8      Basophil % 0.9       Differential Method Automated     COMPREHENSIVE METABOLIC PANEL - Abnormal    Sodium 135 (*)     Potassium 4.5      Chloride 103      CO2 26      Glucose 90      BUN 10      Creatinine 1.1      Calcium 9.0      Total Protein 7.6      Albumin 3.7      Total Bilirubin 0.3      Alkaline Phosphatase 75      AST 10      ALT 8 (*)     eGFR >60.0      Anion Gap 6 (*)    CULTURE, STOOL   LIPASE    Lipase 6     URINALYSIS, REFLEX TO URINE CULTURE    Specimen UA Urine, Clean Catch      Color, UA Yellow      Appearance, UA Clear      pH, UA 6.0      Specific Gravity, UA 1.020      Protein, UA Negative      Glucose, UA Negative      Ketones, UA Negative      Bilirubin (UA) Negative      Occult Blood UA Negative      Nitrite, UA Negative      Urobilinogen, UA Negative      Leukocytes, UA Negative      Narrative:     Specimen Source->Urine          Imaging Results              X-Ray KUB (Final result)  Result time 01/16/25 03:05:00      Final result by Mirta Gayle MD (01/16/25 03:05:00)                   Impression:      As above      Electronically signed by: Mirta Gayle  Date:    01/16/2025  Time:    03:05               Narrative:    EXAMINATION:  XR KUB    CLINICAL HISTORY:  colitis;    TECHNIQUE:  Single AP supine view of the abdomen (KUB) was performed    COMPARISON:  01/07/2025    FINDINGS:  Large volume stool throughout the colon.  Nonspecific gaseous distension of multiple bowel loops.                                       Medications   busPIRone tablet 15 mg (15 mg Oral Given 1/15/25 2011)   colchicine capsule/tablet 0.6 mg (0.6 mg Oral Given 1/15/25 2106)   EScitalopram oxalate tablet 20 mg (has no administration in time range)   fidaxomicin tablet 200 mg (200 mg Oral Given 1/15/25 2106)   HYDROcodone-acetaminophen  mg per tablet 1 tablet (1 tablet Oral Given 1/16/25 0516)   LORazepam tablet 0.5 mg (has no administration in time range)   metoprolol succinate (TOPROL-XL) 24 hr tablet 50 mg (has no  administration in time range)   rivaroxaban tablet 20 mg (20 mg Oral Given 1/15/25 2012)   zolpidem tablet 10 mg (10 mg Oral Given 1/15/25 2012)   melatonin tablet 6 mg (6 mg Oral Given 1/15/25 2012)   ondansetron injection 4 mg (4 mg Intravenous Given 1/15/25 2325)   acetaminophen tablet 650 mg (has no administration in time range)   aluminum-magnesium hydroxide-simethicone 200-200-20 mg/5 mL suspension 30 mL (has no administration in time range)   acetaminophen tablet 650 mg (has no administration in time range)   naloxone 0.4 mg/mL injection 0.02 mg (has no administration in time range)   potassium bicarbonate disintegrating tablet 50 mEq (has no administration in time range)   potassium bicarbonate disintegrating tablet 35 mEq (has no administration in time range)   potassium bicarbonate disintegrating tablet 60 mEq (has no administration in time range)   magnesium oxide tablet 800 mg (has no administration in time range)   magnesium oxide tablet 800 mg (has no administration in time range)   potassium, sodium phosphates 280-160-250 mg packet 2 packet (has no administration in time range)   potassium, sodium phosphates 280-160-250 mg packet 2 packet (has no administration in time range)   potassium, sodium phosphates 280-160-250 mg packet 2 packet (has no administration in time range)   glucose chewable tablet 16 g (has no administration in time range)   glucose chewable tablet 24 g (has no administration in time range)   dextrose 50% injection 12.5 g (has no administration in time range)   dextrose 50% injection 25 g (has no administration in time range)   glucagon (human recombinant) injection 1 mg (has no administration in time range)   insulin aspart U-100 pen 0-10 Units (has no administration in time range)   0.9% NaCl infusion ( Intravenous New Bag 1/15/25 1953)   promethazine (PHENERGAN) 12.5 mg in 0.9% NaCl 50 mL IVPB (has no administration in time range)   metronidazole IVPB 500 mg (0 mg Intravenous  Stopped 1/16/25 0616)   methylPREDNISolone sodium succinate injection 60 mg (60 mg Intravenous Given 1/15/25 2105)   famotidine tablet 20 mg (20 mg Oral Not Given 1/15/25 2130)   ketorolac injection 15 mg (15 mg Intravenous Given 1/16/25 0018)   lactated ringers bolus 1,000 mL (0 mLs Intravenous Stopped 1/15/25 1819)   ondansetron injection 8 mg (8 mg Intravenous Given 1/15/25 1718)   HYDROmorphone injection 1 mg (1 mg Intravenous Given 1/15/25 1718)     Medical Decision Making  50-year-old with nausea vomiting and abdominal pain.  Recently treated for C diff colitis and now having worsening symptoms and continued nausea and vomiting and diarrhea.  Given patient's persistent symptoms and failure of outpatient treatment patient gently hydrated and treated in the emergency department.  Hospital Medicine consulted for evaluation for further management.  Will stop Protonix.  Hydrated and patient did have symptomatic improvement in emergency department    Amount and/or Complexity of Data Reviewed  Labs: ordered. Decision-making details documented in ED Course.    Risk  Prescription drug management.                                      Clinical Impression:  Final diagnoses:  [A04.72] C. difficile colitis  [E86.0] Dehydration (Primary)  [R11.2] Nausea and vomiting, unspecified vomiting type          ED Disposition Condition    Observation Stable                Dorita Samaniego MD  01/16/25 [id number]

## 2025-01-15 NOTE — Clinical Note
Diagnosis: C. difficile colitis [736343]   Future Attending Provider: [facility]IN GIVENS [[id number]]   Place in Observation: [facility] [[id number]]   Special Needs:: No Special Needs [1]

## 2025-01-15 NOTE — H&P
[facility] - Emergency Dept  Hospital Medicine  History & Physical    Patient Name: Jacoby Jean-Baptiste  MRN: [id number]  Patient Class: OP- Observation  Admission Date: 1/15/2025  Attending Physician: Noe Juarez MD   Primary Care Provider: Hunter Aguilar III, MD         Patient information was obtained from patient, past medical records, and ER records.     Subjective:     Principal Problem:C. difficile colitis    Chief Complaint:   Chief Complaint   Patient presents with    Nausea    Vomiting    Diarrhea     States he has had N/V that began last night.  Diagnosed with C Diff 1/6/25 hx of UC        HPI: 50 year old pt getting admitted with C diff colitis and possible UC flare  Pt was in hospital recently with C diff colitis and went home with dificid  Pt is on steroids for UC  He did well after discharge  Later started having abdominal pain all over/crampy associated with nausea  This was followed by vomiting episodes and  weakness  He came back to ER and got admitted     Past Medical History:   Diagnosis Date    C. difficile colitis     Cavitary pneumonia 11/2023    pos aspergillus serology    Diabetes mellitus 01/2022    Hyperlipidemia 2015    Hypertension 2015    Insomnia 2015    Ulcerative colitis        Past Surgical History:   Procedure Laterality Date    BRONCHOSCOPY WITH FLUOROSCOPY Left 11/20/2023    Procedure: BRONCHOSCOPY, WITH FLUOROSCOPY;  Surgeon: Lisa Villarreal MD;  Location: [facility];  Service: Pulmonary;  Laterality: Left;    BRONCHOSCOPY WITH FLUOROSCOPY N/A 11/30/2023    Procedure: BRONCHOSCOPY, WITH FLUOROSCOPY;  Surgeon: Lisa Villarreal MD;  Location: [facility];  Service: Pulmonary;  Laterality: N/A;    CARDIAC ELECTROPHYSIOLOGY MAPPING AND ABLATION  2017    SVT    CHOLECYSTECTOMY  2011    COLONOSCOPY N/A 5/3/2022    Procedure: COLONOSCOPY;  Surgeon: Shan Jean III, MD;  Location: [facility];  Service: Endoscopy;  Laterality: N/A;    COLONOSCOPY N/A 3/16/2024     Procedure: COLONOSCOPY;  Surgeon: ALEKSANDER Ramos MD;  Location: [facility];  Service: Endoscopy;  Laterality: N/A;    COLONOSCOPY WITH FECAL MICROBIOTA TRANSFER N/A 3/21/2023    Procedure: COLONOSCOPY, WITH FECAL MICROBIOTA TRANSFER;  Surgeon: David Millan MD;  Location: [facility];  Service: Endoscopy;  Laterality: N/A;    ESOPHAGOGASTRODUODENOSCOPY N/A 4/24/2023    Procedure: EGD (ESOPHAGOGASTRODUODENOSCOPY);  Surgeon: Shan Jean III, MD;  Location: [facility];  Service: Endoscopy;  Laterality: N/A;    ESOPHAGOGASTRODUODENOSCOPY N/A 6/5/2024    Procedure: EGD (ESOPHAGOGASTRODUODENOSCOPY);  Surgeon: Odalis Mccabe MD;  Location: [facility];  Service: Endoscopy;  Laterality: N/A;    FLEXIBLE SIGMOIDOSCOPY N/A 6/5/2024    Procedure: SIGMOIDOSCOPY, FLEXIBLE;  Surgeon: Odalis Mccabe MD;  Location: [facility];  Service: Endoscopy;  Laterality: N/A;    FLEXIBLE SIGMOIDOSCOPY N/A 7/16/2024    Procedure: SIGMOIDOSCOPY, FLEXIBLE;  Surgeon: Shan Jean III, MD;  Location: [facility];  Service: Endoscopy;  Laterality: N/A;    FLEXIBLE SIGMOIDOSCOPY N/A 8/13/2024    Procedure: SIGMOIDOSCOPY, FLEXIBLE;  Surgeon: Shan Jean III, MD;  Location: [facility];  Service: Endoscopy;  Laterality: N/A;    GANGLION CYST EXCISION Left 1992    UMBILICAL HERNIA REPAIR  2011    with mesh       Review of patient's allergies indicates:  No Known Allergies    Current Facility-Administered Medications on File Prior to Encounter   Medication    lactated ringers infusion     Current Outpatient Medications on File Prior to Encounter   Medication Sig    budesonide (ENTOCORT EC) 3 mg capsule Take 3 capsules (9 mg total) by mouth once daily.    busPIRone (BUSPAR) 15 MG tablet Take 1 tablet (15 mg total) by mouth 3 (three) times daily.    colchicine (COLCRYS) 0.6 mg tablet Take 1 tablet (0.6 mg total) by mouth 2 (two) times daily.    diphenoxylate-atropine 2.5-0.025 mg (LOMOTIL) 2.5-0.025 mg per tablet Take 1  tablet by mouth 4 (four) times daily as needed for Diarrhea.    EScitalopram oxalate (LEXAPRO) 20 MG tablet Take 1 tablet (20 mg total) by mouth once daily.    fidaxomicin (DIFICID) 200 mg Tab Take 1 tablet (200 mg total) by mouth 2 (two) times daily. for 9 days    HYDROcodone-acetaminophen (NORCO)  mg per tablet Take 1 tablet by mouth every 6 (six) hours as needed for Pain.    hyoscyamine (LEVBID) 0.375 mg Tb12 Take 0.375 mg by mouth 2 (two) times daily.    Lactobac 2-Bifido 1-S. therm (VSL#3) 112.5 billion cell Cap Take 1 capsule by mouth twice a day    LORazepam (ATIVAN) 0.5 MG tablet Take 1 tablet (0.5 mg total) by mouth every 6 (six) hours as needed for Anxiety.    magnesium oxide (MAG-OX) 400 mg (241.3 mg magnesium) tablet Take 1 tablet (400 mg total) by mouth 2 (two) times daily.    metoclopramide HCl (REGLAN) 5 MG tablet Take 1 tablet (5 mg total) by mouth every 8 (eight) hours as needed (take as needed before meals).    metoprolol succinate (TOPROL-XL) 50 MG 24 hr tablet Take 1 tablet (50 mg total) by mouth once daily.    promethazine (PHENERGAN) 25 MG tablet Take 25 mg by mouth 4 (four) times daily as needed for Nausea.    rivaroxaban (XARELTO) 20 mg Tab Take 1 tablet (20 mg total) by mouth daily with dinner or evening meal.    simvastatin (ZOCOR) 20 MG tablet Take 1 tablet (20 mg total) by mouth every evening.    zolpidem (AMBIEN) 10 mg Tab Take 1 tablet (10 mg total) by mouth nightly as needed (insomnia).    bezlotoxumab (ZINPLAVA) 25 mg/mL Soln injection Inject 10 mg/kg intravenously single dose, during antibacterial treatment (Patient not taking: Reported on 1/15/2025)    pantoprazole (PROTONIX) 40 MG tablet Take 40 mg by mouth 2 (two) times daily. (Patient not taking: Reported on 1/15/2025)     Family History       Problem Relation (Age of Onset)    Asthma Mother          Tobacco Use    Smoking status: Former     Average packs/day: 1 pack/day for 30.0 years (30.0 ttl pk-yrs)     Types:  Cigarettes     Start date: 1993    Smokeless tobacco: Never    Tobacco comments:     Pt states he has stopped smoking with Chantix three weeks ago. 12/1/23   Substance and Sexual Activity    Alcohol use: Yes     Comment: ocass    Drug use: Not Currently    Sexual activity: Not Currently     Review of Systems   Constitutional:  Negative for activity change and appetite change.   HENT:  Negative for congestion and dental problem.    Eyes:  Negative for discharge and itching.   Respiratory:  Negative for shortness of breath.    Cardiovascular:  Negative for chest pain.   Gastrointestinal:  Positive for abdominal pain, nausea and vomiting. Negative for abdominal distention.   Endocrine: Negative for cold intolerance.   Genitourinary:  Negative for difficulty urinating and dysuria.   Musculoskeletal:  Negative for arthralgias and back pain.   Skin:  Negative for color change.   Neurological:  Negative for dizziness and facial asymmetry.   Hematological:  Negative for adenopathy.   Psychiatric/Behavioral:  Negative for agitation and behavioral problems.      Objective:     Vital Signs (Most Recent):  Temp: 98.3 °F (36.8 °C) (01/15/25 2005)  Pulse: 81 (01/15/25 2005)  Resp: 18 (01/15/25 2005)  BP: 135/83 (01/15/25 2005)  SpO2: 97 % (01/15/25 2005) Vital Signs (24h Range):  Temp:  [98 °F (36.7 °C)-98.7 °F (37.1 °C)] 98.3 °F (36.8 °C)  Pulse:  [81-92] 81  Resp:  [18] 18  SpO2:  [96 %-100 %] 97 %  BP: (107-151)/(60-83) 135/83     Weight: 133 kg (293 lb 3.4 oz)  Body mass index is 48.79 kg/m².     Physical Exam  Vitals and nursing note reviewed.   Constitutional:       Appearance: He is well-developed.   HENT:      Head: Atraumatic.      Right Ear: External ear normal.      Left Ear: External ear normal.      Nose: Nose normal.      Mouth/Throat:      Mouth: Mucous membranes are moist.   Eyes:      Extraocular Movements: Extraocular movements intact.   Cardiovascular:      Rate and Rhythm: Normal rate.   Pulmonary:       Effort: Pulmonary effort is normal.   Abdominal:      Palpations: Abdomen is soft.   Musculoskeletal:         General: Normal range of motion.      Cervical back: Full passive range of motion without pain and normal range of motion.   Skin:     General: Skin is warm.   Neurological:      Mental Status: He is alert and oriented to person, place, and time.   Psychiatric:         Behavior: Behavior normal.                Significant Labs: All pertinent labs within the past 24 hours have been reviewed.  CBC:   Recent Labs   Lab 01/15/25  1608   WBC 11.82   HGB 12.3*   HCT 40.3        CMP:   Recent Labs   Lab 01/15/25  1608   *   K 4.5      CO2 26   GLU 90   BUN 10   CREATININE 1.1   CALCIUM 9.0   PROT 7.6   ALBUMIN 3.7   BILITOT 0.3   ALKPHOS 75   AST 10   ALT 8*   ANIONGAP 6*       Significant Imaging: I have reviewed all pertinent imaging results/findings within the past 24 hours.    Assessment/Plan:     * C. difficile colitis  Maintain PO dificid  Other conservative measures  KUB   Stp MgO and PPI      Exacerbation of ulcerative colitis without complication  Pt on budesonide caps which will be changed to iv solumedrol at the moment   Iv fluids  Added iv flagyl  Wont add quinolone because it can worsen C diff colitis  Other conservative measures       Recurrent Clostridioides difficile infection  Aware       History of DVT (deep vein thrombosis)  Maintain NOAC which pt is on at home      Long-term use of immunosuppressant medication  Aware       PUD (peptic ulcer disease)  Pepcid  Wont give PPI because of C Diff       Type 2 diabetes mellitus without complication, without long-term current use of insulin  Maintain present regime       Morbid obesity  Body mass index is 48.79 kg/m². Morbid obesity complicates all aspects of disease management from diagnostic modalities to treatment.      VTE Risk Mitigation (From admission, onward)           Ordered     rivaroxaban tablet 20 mg  With dinner          01/15/25 1744     IP VTE HIGH RISK PATIENT  Once         01/15/25 1744     Place sequential compression device  Until discontinued         01/15/25 1744                       On 01/15/2025, patient should be placed in hospital observation services under my care.             Noe Juarez MD  Department of Hospital Medicine  [facility] - Emergency Dept

## 2025-01-16 LAB
ALBUMIN SERPL BCP-MCNC: 3.3 G/DL (ref 3.5–5.2)
ALP SERPL-CCNC: 67 U/L (ref 55–135)
ALT SERPL W/O P-5'-P-CCNC: 6 U/L (ref 10–44)
ANION GAP SERPL CALC-SCNC: 5 MMOL/L (ref 8–16)
AST SERPL-CCNC: 8 U/L (ref 10–40)
BASOPHILS # BLD AUTO: 0.02 K/UL (ref 0–0.2)
BASOPHILS NFR BLD: 0.3 % (ref 0–1.9)
BILIRUB SERPL-MCNC: 0.3 MG/DL (ref 0.1–1)
BUN SERPL-MCNC: 12 MG/DL (ref 6–20)
CALCIUM SERPL-MCNC: 8.6 MG/DL (ref 8.7–10.5)
CHLORIDE SERPL-SCNC: 105 MMOL/L (ref 95–110)
CO2 SERPL-SCNC: 25 MMOL/L (ref 23–29)
CREAT SERPL-MCNC: 1.1 MG/DL (ref 0.5–1.4)
DIFFERENTIAL METHOD BLD: ABNORMAL
EOSINOPHIL # BLD AUTO: 0 K/UL (ref 0–0.5)
EOSINOPHIL NFR BLD: 0.3 % (ref 0–8)
ERYTHROCYTE [DISTWIDTH] IN BLOOD BY AUTOMATED COUNT: 17.3 % (ref 11.5–14.5)
EST. GFR  (NO RACE VARIABLE): >60 ML/MIN/1.73 M^2
GLUCOSE SERPL-MCNC: 148 MG/DL (ref 70–110)
HCT VFR BLD AUTO: 35.7 % (ref 40–54)
HGB BLD-MCNC: 10.7 G/DL (ref 14–18)
IMM GRANULOCYTES # BLD AUTO: 0.07 K/UL (ref 0–0.04)
IMM GRANULOCYTES NFR BLD AUTO: 0.9 % (ref 0–0.5)
LIPASE SERPL-CCNC: 3 U/L (ref 4–60)
LYMPHOCYTES # BLD AUTO: 0.6 K/UL (ref 1–4.8)
LYMPHOCYTES NFR BLD: 7.7 % (ref 18–48)
MAGNESIUM SERPL-MCNC: 1.7 MG/DL (ref 1.6–2.6)
MCH RBC QN AUTO: 26 PG (ref 27–31)
MCHC RBC AUTO-ENTMCNC: 30 G/DL (ref 32–36)
MCV RBC AUTO: 87 FL (ref 82–98)
MONOCYTES # BLD AUTO: 0.1 K/UL (ref 0.3–1)
MONOCYTES NFR BLD: 1.3 % (ref 4–15)
NEUTROPHILS # BLD AUTO: 6.9 K/UL (ref 1.8–7.7)
NEUTROPHILS NFR BLD: 89.5 % (ref 38–73)
NRBC BLD-RTO: 0 /100 WBC
PLATELET # BLD AUTO: 358 K/UL (ref 150–450)
PMV BLD AUTO: 10.2 FL (ref 9.2–12.9)
POTASSIUM SERPL-SCNC: 5.1 MMOL/L (ref 3.5–5.1)
PROT SERPL-MCNC: 6.6 G/DL (ref 6–8.4)
RBC # BLD AUTO: 4.11 M/UL (ref 4.6–6.2)
SODIUM SERPL-SCNC: 135 MMOL/L (ref 136–145)
TROPONIN I SERPL HS-MCNC: 2.5 PG/ML (ref 0–14.9)
TROPONIN I SERPL HS-MCNC: 2.7 PG/ML (ref 0–14.9)
WBC # BLD AUTO: 7.71 K/UL (ref 3.9–12.7)

## 2025-01-16 PROCEDURE — 25000003 PHARM REV CODE 250: Performed by: INTERNAL MEDICINE

## 2025-01-16 PROCEDURE — 96376 TX/PRO/DX INJ SAME DRUG ADON: CPT

## 2025-01-16 PROCEDURE — G0378 HOSPITAL OBSERVATION PER HR: HCPCS

## 2025-01-16 PROCEDURE — 87045 FECES CULTURE AEROBIC BACT: CPT | Performed by: INTERNAL MEDICINE

## 2025-01-16 PROCEDURE — 25500020 PHARM REV CODE 255: Performed by: FAMILY MEDICINE

## 2025-01-16 PROCEDURE — 93010 ELECTROCARDIOGRAM REPORT: CPT | Mod: ,,, | Performed by: GENERAL PRACTICE

## 2025-01-16 PROCEDURE — 83735 ASSAY OF MAGNESIUM: CPT | Performed by: INTERNAL MEDICINE

## 2025-01-16 PROCEDURE — 93005 ELECTROCARDIOGRAM TRACING: CPT | Performed by: GENERAL PRACTICE

## 2025-01-16 PROCEDURE — 36415 COLL VENOUS BLD VENIPUNCTURE: CPT | Performed by: INTERNAL MEDICINE

## 2025-01-16 PROCEDURE — 83993 ASSAY FOR CALPROTECTIN FECAL: CPT | Performed by: INTERNAL MEDICINE

## 2025-01-16 PROCEDURE — 80053 COMPREHEN METABOLIC PANEL: CPT | Performed by: INTERNAL MEDICINE

## 2025-01-16 PROCEDURE — 63600175 PHARM REV CODE 636 W HCPCS: Performed by: NURSE PRACTITIONER

## 2025-01-16 PROCEDURE — 96375 TX/PRO/DX INJ NEW DRUG ADDON: CPT

## 2025-01-16 PROCEDURE — 85025 COMPLETE CBC W/AUTO DIFF WBC: CPT | Performed by: INTERNAL MEDICINE

## 2025-01-16 PROCEDURE — 87449 NOS EACH ORGANISM AG IA: CPT | Performed by: INTERNAL MEDICINE

## 2025-01-16 PROCEDURE — 25000003 PHARM REV CODE 250: Performed by: NURSE PRACTITIONER

## 2025-01-16 PROCEDURE — 96361 HYDRATE IV INFUSION ADD-ON: CPT

## 2025-01-16 PROCEDURE — 87040 BLOOD CULTURE FOR BACTERIA: CPT | Performed by: INTERNAL MEDICINE

## 2025-01-16 PROCEDURE — 63600175 PHARM REV CODE 636 W HCPCS: Mod: JZ,TB | Performed by: INTERNAL MEDICINE

## 2025-01-16 PROCEDURE — 63600175 PHARM REV CODE 636 W HCPCS: Mod: JW,TB | Performed by: STUDENT IN AN ORGANIZED HEALTH CARE EDUCATION/TRAINING PROGRAM

## 2025-01-16 PROCEDURE — 36415 COLL VENOUS BLD VENIPUNCTURE: CPT | Performed by: NURSE PRACTITIONER

## 2025-01-16 PROCEDURE — 84484 ASSAY OF TROPONIN QUANT: CPT | Performed by: NURSE PRACTITIONER

## 2025-01-16 PROCEDURE — 83690 ASSAY OF LIPASE: CPT | Performed by: NURSE PRACTITIONER

## 2025-01-16 PROCEDURE — 96366 THER/PROPH/DIAG IV INF ADDON: CPT

## 2025-01-16 PROCEDURE — 87046 STOOL CULTR AEROBIC BACT EA: CPT | Performed by: INTERNAL MEDICINE

## 2025-01-16 RX ORDER — LIDOCAINE HYDROCHLORIDE 20 MG/ML
15 SOLUTION OROPHARYNGEAL ONCE
Status: COMPLETED | OUTPATIENT
Start: 2025-01-16 | End: 2025-01-16

## 2025-01-16 RX ORDER — POLYETHYLENE GLYCOL 3350 17 G/17G
238 POWDER, FOR SOLUTION ORAL ONCE
Status: COMPLETED | OUTPATIENT
Start: 2025-01-16 | End: 2025-01-16

## 2025-01-16 RX ORDER — ALUMINUM HYDROXIDE, MAGNESIUM HYDROXIDE, AND SIMETHICONE 1200; 120; 1200 MG/30ML; MG/30ML; MG/30ML
30 SUSPENSION ORAL ONCE
Status: COMPLETED | OUTPATIENT
Start: 2025-01-16 | End: 2025-01-16

## 2025-01-16 RX ORDER — MORPHINE SULFATE 4 MG/ML
4 INJECTION, SOLUTION INTRAMUSCULAR; INTRAVENOUS EVERY 4 HOURS PRN
Status: DISCONTINUED | OUTPATIENT
Start: 2025-01-16 | End: 2025-01-18

## 2025-01-16 RX ORDER — BISACODYL 5 MG
10 TABLET, DELAYED RELEASE (ENTERIC COATED) ORAL ONCE
Status: COMPLETED | OUTPATIENT
Start: 2025-01-16 | End: 2025-01-16

## 2025-01-16 RX ORDER — POLYETHYLENE GLYCOL 3350 17 G/17G
238 POWDER, FOR SOLUTION ORAL ONCE
Status: DISCONTINUED | OUTPATIENT
Start: 2025-01-16 | End: 2025-01-16

## 2025-01-16 RX ORDER — METOCLOPRAMIDE HYDROCHLORIDE 5 MG/ML
5 INJECTION INTRAMUSCULAR; INTRAVENOUS EVERY 6 HOURS PRN
Status: DISCONTINUED | OUTPATIENT
Start: 2025-01-16 | End: 2025-01-16

## 2025-01-16 RX ADMIN — KETOROLAC TROMETHAMINE 15 MG: 30 INJECTION, SOLUTION INTRAMUSCULAR; INTRAVENOUS at 12:01

## 2025-01-16 RX ADMIN — BUSPIRONE HYDROCHLORIDE 15 MG: 5 TABLET ORAL at 09:01

## 2025-01-16 RX ADMIN — LIDOCAINE HYDROCHLORIDE 15 ML: 20 SOLUTION ORAL at 09:01

## 2025-01-16 RX ADMIN — IOHEXOL 100 ML: 350 INJECTION, SOLUTION INTRAVENOUS at 06:01

## 2025-01-16 RX ADMIN — FIDAXOMICIN 200 MG: 200 TABLET, FILM COATED ORAL at 08:01

## 2025-01-16 RX ADMIN — ALUMINUM HYDROXIDE, MAGNESIUM HYDROXIDE, AND SIMETHICONE 30 ML: 200; 200; 20 SUSPENSION ORAL at 09:01

## 2025-01-16 RX ADMIN — METRONIDAZOLE 500 MG: 500 INJECTION, SOLUTION INTRAVENOUS at 05:01

## 2025-01-16 RX ADMIN — FIDAXOMICIN 200 MG: 200 TABLET, FILM COATED ORAL at 09:01

## 2025-01-16 RX ADMIN — METHYLPREDNISOLONE SODIUM SUCCINATE 60 MG: 40 INJECTION, POWDER, FOR SOLUTION INTRAMUSCULAR; INTRAVENOUS at 08:01

## 2025-01-16 RX ADMIN — POLYETHYLENE GLYCOL 3350 238 G: 17 POWDER, FOR SOLUTION ORAL at 08:01

## 2025-01-16 RX ADMIN — COLCHICINE 0.6 MG: 0.6 TABLET, FILM COATED ORAL at 08:01

## 2025-01-16 RX ADMIN — BUSPIRONE HYDROCHLORIDE 15 MG: 5 TABLET ORAL at 08:01

## 2025-01-16 RX ADMIN — ONDANSETRON 4 MG: 2 INJECTION INTRAMUSCULAR; INTRAVENOUS at 02:01

## 2025-01-16 RX ADMIN — METRONIDAZOLE 500 MG: 500 INJECTION, SOLUTION INTRAVENOUS at 10:01

## 2025-01-16 RX ADMIN — COLCHICINE 0.6 MG: 0.6 TABLET, FILM COATED ORAL at 09:01

## 2025-01-16 RX ADMIN — ACETAMINOPHEN 650 MG: 325 TABLET ORAL at 09:01

## 2025-01-16 RX ADMIN — FAMOTIDINE 20 MG: 20 TABLET, FILM COATED ORAL at 08:01

## 2025-01-16 RX ADMIN — SODIUM CHLORIDE: 9 INJECTION, SOLUTION INTRAVENOUS at 09:01

## 2025-01-16 RX ADMIN — HYDROCODONE BITARTRATE AND ACETAMINOPHEN 1 TABLET: 10; 325 TABLET ORAL at 05:01

## 2025-01-16 RX ADMIN — MORPHINE SULFATE 4 MG: 4 INJECTION, SOLUTION INTRAMUSCULAR; INTRAVENOUS at 03:01

## 2025-01-16 RX ADMIN — MORPHINE SULFATE 4 MG: 4 INJECTION, SOLUTION INTRAMUSCULAR; INTRAVENOUS at 08:01

## 2025-01-16 RX ADMIN — ESCITALOPRAM OXALATE 20 MG: 10 TABLET ORAL at 09:01

## 2025-01-16 RX ADMIN — MORPHINE SULFATE 4 MG: 4 INJECTION, SOLUTION INTRAMUSCULAR; INTRAVENOUS at 11:01

## 2025-01-16 RX ADMIN — BISACODYL 10 MG: 5 TABLET, COATED ORAL at 03:01

## 2025-01-16 RX ADMIN — ONDANSETRON 4 MG: 2 INJECTION INTRAMUSCULAR; INTRAVENOUS at 09:01

## 2025-01-16 RX ADMIN — BUSPIRONE HYDROCHLORIDE 15 MG: 5 TABLET ORAL at 02:01

## 2025-01-16 RX ADMIN — METOPROLOL SUCCINATE 50 MG: 50 TABLET, FILM COATED, EXTENDED RELEASE ORAL at 09:01

## 2025-01-16 NOTE — ASSESSMENT & PLAN NOTE
Body mass index is 48.79 kg/m². Morbid obesity complicates all aspects of disease management from diagnostic modalities to treatment.

## 2025-01-16 NOTE — PROGRESS NOTES
[facility] Medicine  Progress Note    Patient Name: Jacoby Jean-Baptiste  MRN: [id number]  Patient Class: OP- Observation   Admission Date: 1/15/2025  Length of Stay: 0 days  Attending Physician: Matteo Melendez DO  Primary Care Provider: Hunter Aguilar III, MD        Subjective     Principal Problem:C. difficile colitis        HPI:  50 year old pt getting admitted with C diff colitis and possible UC flare  Pt was in hospital recently with C diff colitis and went home with dificid  Pt is on steroids for UC  He did well after discharge  Later started having abdominal pain all over/crampy associated with nausea  This was followed by vomiting episodes and  weakness  He came back to ER and got admitted     Overview/Hospital Course:  The patient was admitted to the hospital medicine service and was monitored closely.  The patient was started on fidaxomicin and Flagyl.  The patient was also given IV steroids for possible UC flare.  Infectious Disease and GI were consulted.       Interval History:  Patient evaluated at bedside.  No nausea and vomiting at present.  Does report abdominal pain and epigastric pain.  Still with diarrhea.    Review of Systems   Constitutional:  Positive for activity change and appetite change. Negative for chills and fever.   Cardiovascular:  Positive for chest pain.   Gastrointestinal:  Positive for abdominal pain and diarrhea. Negative for nausea and vomiting.     Objective:     Vital Signs (Most Recent):  Temp: 97.7 °F (36.5 °C) (01/16/25 1153)  Pulse: 77 (01/16/25 1153)  Resp: 18 (01/16/25 1153)  BP: 136/82 (01/16/25 1153)  SpO2: 99 % (01/16/25 1153) Vital Signs (24h Range):  Temp:  [97.7 °F (36.5 °C)-98.7 °F (37.1 °C)] 97.7 °F (36.5 °C)  Pulse:  [77-92] 77  Resp:  [16-18] 18  SpO2:  [92 %-100 %] 99 %  BP: (107-151)/(60-83) 136/82     Weight: 133 kg (293 lb 3.4 oz)  Body mass index is 48.79 kg/m².    Intake/Output Summary (Last 24 hours) at 1/16/2025 1339  Last data  filed at 1/16/2025 1137  Gross per 24 hour   Intake 580 ml   Output --   Net 580 ml         Physical Exam  Constitutional:       Appearance: Normal appearance. He is obese. He is not toxic-appearing.   HENT:      Nose: Nose normal.      Mouth/Throat:      Mouth: Mucous membranes are dry.      Pharynx: Oropharynx is clear.   Eyes:      Extraocular Movements: Extraocular movements intact.      Conjunctiva/sclera: Conjunctivae normal.   Cardiovascular:      Rate and Rhythm: Normal rate and regular rhythm.      Pulses: Normal pulses.      Heart sounds: Normal heart sounds.   Pulmonary:      Effort: Pulmonary effort is normal.      Breath sounds: Normal breath sounds.   Abdominal:      General: Bowel sounds are normal. There is no distension.      Palpations: Abdomen is soft.      Tenderness: There is abdominal tenderness. There is no guarding.   Skin:     General: Skin is warm.      Capillary Refill: Capillary refill takes less than 2 seconds.   Neurological:      Mental Status: He is alert and oriented to person, place, and time.   Psychiatric:         Mood and Affect: Mood normal.             Significant Labs: All pertinent labs within the past 24 hours have been reviewed.  Recent Lab Results         01/16/25  0937   01/16/25  0505   01/15/25  1608   01/15/25  1556        Albumin   3.3   3.7         ALP   67   75         ALT   6   8         Anion Gap   5   6         Appearance, UA       Clear       AST   8   10         Baso #   0.02   0.11         Basophil %   0.3   0.9         Bilirubin (UA)       Negative       BILIRUBIN TOTAL   0.3  Comment: For infants and newborns, interpretation of results should be based  on gestational age, weight and in agreement with clinical  observations.    Premature Infant recommended reference ranges:  Up to 24 hours.............<8.0 mg/dL  Up to 48 hours............<12.0 mg/dL  3-5 days..................<15.0 mg/dL  6-29 days.................<15.0 mg/dL     0.3  Comment: For infants  and newborns, interpretation of results should be based  on gestational age, weight and in agreement with clinical  observations.    Premature Infant recommended reference ranges:  Up to 24 hours.............<8.0 mg/dL  Up to 48 hours............<12.0 mg/dL  3-5 days..................<15.0 mg/dL  6-29 days.................<15.0 mg/dL           Blood Culture, Routine   No Growth to date  [P]           BUN   12   10         Calcium   8.6   9.0         Chloride   105   103         CO2   25   26         Color, UA       Yellow       Creatinine   1.1   1.1         Differential Method   Automated   Automated         eGFR   >60.0   >60.0         Eos #   0.0   0.7         Eos %   0.3   5.8         Glucose   148   90         Glucose, UA       Negative       Gran # (ANC)   6.9   8.4         Gran %   89.5   70.8         Hematocrit   35.7   40.3         Hemoglobin   10.7   12.3         Immature Grans (Abs)   0.07  Comment: Mild elevation in immature granulocytes is non specific and   can be seen in a variety of conditions including stress response,   acute inflammation, trauma and pregnancy. Correlation with other   laboratory and clinical findings is essential.     0.05  Comment: Mild elevation in immature granulocytes is non specific and   can be seen in a variety of conditions including stress response,   acute inflammation, trauma and pregnancy. Correlation with other   laboratory and clinical findings is essential.           Immature Granulocytes   0.9   0.4         Ketones, UA       Negative       Leukocyte Esterase, UA       Negative       Lipase 3     6         Lymph #   0.6   1.7         Lymph %   7.7   14.1         Magnesium    1.7           MCH   26.0   26.5         MCHC   30.0   30.5         MCV   87   87         Mono #   0.1   1.0         Mono %   1.3   8.0         MPV   10.2   9.8         NITRITE UA       Negative       nRBC   0   0         Blood, UA       Negative       pH, UA       6.0       Platelet Count    358   412         Potassium   5.1   4.5         PROTEIN TOTAL   6.6   7.6         Protein, UA       Negative  Comment: Recommend a 24 hour urine protein or a urine   protein/creatinine ratio if globulin induced proteinuria is  clinically suspected.         RBC   4.11   4.65         RDW   17.3   17.4         Sodium   135   135         Spec Grav UA       1.020       Specimen UA       Urine, Clean Catch       Troponin I High Sensitivity 2.7  Comment: Troponin results differ between methods. Do not use   results between Troponin methods interchangeably.    Alkaline Phospatase levels above 400 U/L may   cause false positive results.    Access hsTnI should not be used for patients taking   Asfotase erika (Strensiq).               UROBILINOGEN UA       Negative       WBC   7.71   11.82                  [P] - Preliminary Result               Significant Imaging: I have reviewed all pertinent imaging results/findings within the past 24 hours.    Assessment and Plan     * C. difficile colitis  Maintain PO dificid  Other conservative measures  KUB   Stp MgO and PPI      Recurrent Clostridioides difficile infection  Aware       History of DVT (deep vein thrombosis)  Maintain NOAC which pt is on at home      Long-term use of immunosuppressant medication  Aware       Chest pain  Resolved with GI cocktail  EKG with normal sinus rhythm no ischemic changes  Troponin negative, 2nd troponin pending  Chest x-ray without acute findings      PUD (peptic ulcer disease)  Pepcid  Wont give PPI because of C Diff       Exacerbation of ulcerative colitis without complication  Pt on budesonide caps which will be changed to iv solumedrol at the moment   Iv fluids  Added iv flagyl  Wont add quinolone because it can worsen C diff colitis  Other conservative measures   Consult to GI    Type 2 diabetes mellitus without complication, without long-term current use of insulin  Maintain present regime       Morbid obesity  Body mass index is 48.79 kg/m².  Morbid obesity complicates all aspects of disease management from diagnostic modalities to treatment.      VTE Risk Mitigation (From admission, onward)           Ordered     rivaroxaban tablet 20 mg  With dinner         01/15/25 1744     IP VTE HIGH RISK PATIENT  Once         01/15/25 1744     Place sequential compression device  Until discontinued         01/15/25 1744                    Discharge Planning   GERARDO: 1/18/2025     Code Status: Full Code   Medical Readiness for Discharge Date:   Discharge Plan A: Home Health                        JAMIA Jordan  Department of Hospital Medicine   [facility]

## 2025-01-16 NOTE — CONSULTS
GASTROENTEROLOGY INPATIENT CONSULT NOTE  Patient Name: Jacoby Jean-Baptiste  Patient MRN: [id number]  Patient : 1975    Admit Date: 1/15/2025  Service date: 2025    Reason for Consult: uc/ cdiff colitis    PCP: Hunter Aguilar III, MD    Chief Complaint   Patient presents with    Nausea    Vomiting    Diarrhea     States he has had N/V that began last night.  Diagnosed with C Diff 25 hx of UC       HPI: Patient is 49 y.o. male with PMHx 5HLD, DM on ozempic, HTN, LOLIS, umbilical hernia repair, tobacco use, C. diff s/p Vanco / dificid / FMT / rebyota presents for evaluation of  diarrhea. Acute/chronic onset, intermittent, progressive since stopping remicade. Was having 10+ BM daily but went down to 1-2 BM daily w/ intermittent bleeding while on remicade . Got fecal xpolant in 3/'23. Remicade stopped due to cavitary fungal lesions likely from immunosuppression from remicade. Started on Entyvio in  but seen again in hospital in early  & again in 3/'24 w/ severe disease. Omvoq started late  after insurance fight.  Had 3rd dose of loading Omvoq on 24.      Patient admitted with recurrence of bloody diarrhea 10 to 12 bowel movements a day as well as left upper screen abscess       8 d ago 1 mo ago 2 mo ago     Calprotectin 0 - 120 ug/g 5480 363CM 1050C         CHART REVIEW:  CT ABd   C. Diff  - negative  EGD/colon  - small HH; ampullary tic; mild ileitis (TI nml previously.?backwash?), L sided colitis  -reflux esophagitis. focal intestinal metaplasia antrum. Mild ileitis; sigmoid/proctitis; findings c/w UC; CMV negative in L sided biopsies  Labs  -  Calprotectin 2480 on Omvoh at around 2 months. (Was >8K on entyvio but had near normalized on remicade); Negative Stool PCR  Hospitalization  - colitis on CT scan; Neg C. diff   Hospitalization 3/'24 - Calpro >8K; flex w/ severe L sided UC; placed on steroids. C. diff neg;....d/c entyvio  Hospitalization ; CT  -  fatty liver; L sided colitis; Calpro back up ot 3070 (201 on remicade); Neg C. diff  Entyvio started 1/'24  Hospitalization 11/'23 - cavitary fungal lesions  Labs 10/'23 - C.diff negative after vanco and rebyota enemas.  Labs 7/'23 -  Calprotectin 201 on remicade w/ C. diff...tx w/ vanco w/ long taper +/- rebyota; Nml CRP much better on remicade  EGD 4/'23 - candidate esophagitis / duodenitis; HP neg gastirits  Hospitalization 4/'23 - colitis on CT; C. diff neg; Remicade loaded  Colon 3/'23 - Pan-colitis; Nml TI; s/p FMT for refractory C. diff  C. DIff + 3/'23  Labs 1/'23 - CRP 21; Stool PCR + C. DIff....  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     Past Medical History:  Past Medical History:   Diagnosis Date    C. difficile colitis     Cavitary pneumonia 11/2023    pos aspergillus serology    Diabetes mellitus 01/2022    Hyperlipidemia 2015    Hypertension 2015    Insomnia 2015    Ulcerative colitis         Past Surgical History:  Past Surgical History:   Procedure Laterality Date    BRONCHOSCOPY WITH FLUOROSCOPY Left 11/20/2023    Procedure: BRONCHOSCOPY, WITH FLUOROSCOPY;  Surgeon: Lisa Villarreal MD;  Location: [facility];  Service: Pulmonary;  Laterality: Left;    BRONCHOSCOPY WITH FLUOROSCOPY N/A 11/30/2023    Procedure: BRONCHOSCOPY, WITH FLUOROSCOPY;  Surgeon: Lisa Villarreal MD;  Location: [facility];  Service: Pulmonary;  Laterality: N/A;    CARDIAC ELECTROPHYSIOLOGY MAPPING AND ABLATION  2017    SVT    CHOLECYSTECTOMY  2011    COLONOSCOPY N/A 5/3/2022    Procedure: COLONOSCOPY;  Surgeon: Shan Jean III, MD;  Location: [facility];  Service: Endoscopy;  Laterality: N/A;    COLONOSCOPY N/A 3/16/2024    Procedure: COLONOSCOPY;  Surgeon: ALEKSANDER Ramos MD;  Location: [facility];  Service: Endoscopy;  Laterality: N/A;    COLONOSCOPY WITH FECAL MICROBIOTA TRANSFER N/A 3/21/2023     Procedure: COLONOSCOPY, WITH FECAL MICROBIOTA TRANSFER;  Surgeon: David Millan MD;  Location: [facility];  Service: Endoscopy;  Laterality: N/A;    ESOPHAGOGASTRODUODENOSCOPY N/A 4/24/2023    Procedure: EGD (ESOPHAGOGASTRODUODENOSCOPY);  Surgeon: Shan Jean III, MD;  Location: [facility];  Service: Endoscopy;  Laterality: N/A;    ESOPHAGOGASTRODUODENOSCOPY N/A 6/5/2024    Procedure: EGD (ESOPHAGOGASTRODUODENOSCOPY);  Surgeon: Odalis Mccabe MD;  Location: [facility];  Service: Endoscopy;  Laterality: N/A;    FLEXIBLE SIGMOIDOSCOPY N/A 6/5/2024    Procedure: SIGMOIDOSCOPY, FLEXIBLE;  Surgeon: Odalis Mccabe MD;  Location: [facility];  Service: Endoscopy;  Laterality: N/A;    FLEXIBLE SIGMOIDOSCOPY N/A 7/16/2024    Procedure: SIGMOIDOSCOPY, FLEXIBLE;  Surgeon: Shan Jean III, MD;  Location: [facility];  Service: Endoscopy;  Laterality: N/A;    FLEXIBLE SIGMOIDOSCOPY N/A 8/13/2024    Procedure: SIGMOIDOSCOPY, FLEXIBLE;  Surgeon: Shan Jean III, MD;  Location: [facility];  Service: Endoscopy;  Laterality: N/A;    GANGLION CYST EXCISION Left 1992    UMBILICAL HERNIA REPAIR  2011    with mesh        Home Medications:  Medications Prior to Admission   Medication Sig Dispense Refill Last Dose/Taking    budesonide (ENTOCORT EC) 3 mg capsule Take 3 capsules (9 mg total) by mouth once daily. 180 each 0 1/15/2025 Morning    busPIRone (BUSPAR) 15 MG tablet Take 1 tablet (15 mg total) by mouth 3 (three) times daily. 90 tablet 5 1/15/2025 Morning    colchicine (COLCRYS) 0.6 mg tablet Take 1 tablet (0.6 mg total) by mouth 2 (two) times daily. 180 tablet 0 1/15/2025 Morning    diphenoxylate-atropine 2.5-0.025 mg (LOMOTIL) 2.5-0.025 mg per tablet Take 1 tablet by mouth 4 (four) times daily as needed for Diarrhea. 40 tablet 0 Past Week    EScitalopram oxalate (LEXAPRO) 20 MG tablet Take 1 tablet (20 mg total) by mouth once daily. 90 tablet 0 1/15/2025 Morning    fidaxomicin (DIFICID) 200 mg Tab  Take 1 tablet (200 mg total) by mouth 2 (two) times daily. for 9 days 18 tablet 0 1/15/2025 Morning    HYDROcodone-acetaminophen (NORCO)  mg per tablet Take 1 tablet by mouth every 6 (six) hours as needed for Pain. 28 tablet 0 Past Week    hyoscyamine (LEVBID) 0.375 mg Tb12 Take 0.375 mg by mouth 2 (two) times daily.   1/15/2025 Morning    Lactobac 2-Bifido 1-S. therm (VSL#3) 112.5 billion cell Cap Take 1 capsule by mouth twice a day 60 capsule 0 1/15/2025 Morning    LORazepam (ATIVAN) 0.5 MG tablet Take 1 tablet (0.5 mg total) by mouth every 6 (six) hours as needed for Anxiety. 90 tablet 5 Past Week    magnesium oxide (MAG-OX) 400 mg (241.3 mg magnesium) tablet Take 1 tablet (400 mg total) by mouth 2 (two) times daily. 28 tablet 0 1/15/2025 Morning    metoclopramide HCl (REGLAN) 5 MG tablet Take 1 tablet (5 mg total) by mouth every 8 (eight) hours as needed (take as needed before meals). 90 tablet 0 Past Week    metoprolol succinate (TOPROL-XL) 50 MG 24 hr tablet Take 1 tablet (50 mg total) by mouth once daily. 90 tablet 1 1/15/2025 Morning    promethazine (PHENERGAN) 25 MG tablet Take 25 mg by mouth 4 (four) times daily as needed for Nausea.   Past Week    rivaroxaban (XARELTO) 20 mg Tab Take 1 tablet (20 mg total) by mouth daily with dinner or evening meal. 90 tablet 0 1/14/2025 Evening    simvastatin (ZOCOR) 20 MG tablet Take 1 tablet (20 mg total) by mouth every evening. 90 tablet 1 1/14/2025 Evening    zolpidem (AMBIEN) 10 mg Tab Take 1 tablet (10 mg total) by mouth nightly as needed (insomnia). 30 tablet 2 Past Week    bezlotoxumab (ZINPLAVA) 25 mg/mL Soln injection Inject 10 mg/kg intravenously single dose, during antibacterial treatment (Patient not taking: Reported on 1/15/2025) 40 mL 0 Not Taking    pantoprazole (PROTONIX) 40 MG tablet Take 40 mg by mouth 2 (two) times daily. (Patient not taking: Reported on 1/15/2025)   Not Taking       Inpatient Medications:  Review of patient's allergies  indicates:  No Known Allergies    Social History:   Social History     Occupational History    Not on file   Tobacco Use    Smoking status: Former     Average packs/day: 1 pack/day for 30.0 years (30.0 ttl pk-yrs)     Types: Cigarettes     Start date: 1993    Smokeless tobacco: Never    Tobacco comments:     Pt states he has stopped smoking with Chantix three weeks ago. 12/1/23   Substance and Sexual Activity    Alcohol use: Yes     Comment: ocass    Drug use: Not Currently    Sexual activity: Not Currently       Family History:   Family History   Problem Relation Name Age of Onset    Asthma Mother         Review of Systems:  GENERAL: No fever, chills, weight loss  SKIN: No rashes, jaundice, pruritus  HEENT: No rhinorrhea, epistaxis, vision changes.  No trauma, tinnitus, lymphadenopathy or pharyngitis  CV: No chest pain, palpitations, edima or BAILEY  PULM: No cough or sputum production.  No wheezing.  GI: AS IN HPI  URINARY:  No hematuria, dysuria  MS: No change in muscle or joint pain, weakness, or ROM  Neuro: No focal neurologic changes  PSYCH: No change in mood or personality.  No suicidal ideation.  ENDOCRINE: No fatigue      OBJECTIVE:    Vitals:    01/16/25 0718 01/16/25 1126 01/16/25 1153 01/16/25 1546   BP: 131/80  136/82    Pulse: 80  77    Resp: 18 16 18 16   Temp: 97.8 °F (36.6 °C)  97.7 °F (36.5 °C)    TempSrc: Oral  Oral    SpO2: (!) 94%  99%    Weight:       Height:         Physical Exam:    GEN: well-developed, well-nourished, awake and alert, non-toxic appearing adult  HEENT: PERRL, sclera anicteric, oral mucosa pink and moist without lesion  NECK: trachea midline; Good ROM  CV: regular rate and rhythm, no murmurs or gallops  RESP: clear to auscultation bilaterally, no wheezes, rhonci or rales  ABD: soft, non-tender, non-distended, normal bowel sounds  EXT: no swelling or edema, 2+ pulses distally  SKIN: no rashes or jaundice  PSYCH: normal affect    Labs:   Recent Labs   Lab 01/15/25  1608  "01/16/25  0505   WBC 11.82 7.71   HGB 12.3* 10.7*    358   MCV 87 87     No results for input(s): "IRON", "FERRITIN" in the last 168 hours.    Invalid input(s): "IRONSAT"  Recent Labs   Lab 01/15/25  1608 01/16/25  0505   * 135*   K 4.5 5.1   BUN 10 12   CREATININE 1.1 1.1   ALBUMIN 3.7 3.3*   AST 10 8*   ALT 8* 6*   ALKPHOS 75 67            Radiology Review:  Imaging Results              X-Ray KUB (Final result)  Result time 01/16/25 03:05:00      Final result by Mirta Gayle MD (01/16/25 03:05:00)                   Impression:      As above      Electronically signed by: Mirta Gayle  Date:    01/16/2025  Time:    03:05               Narrative:    EXAMINATION:  XR KUB    CLINICAL HISTORY:  colitis;    TECHNIQUE:  Single AP supine view of the abdomen (KUB) was performed    COMPARISON:  01/07/2025    FINDINGS:  Large volume stool throughout the colon.  Nonspecific gaseous distension of multiple bowel loops.                                          IMPRESSION / RECOMMENDATIONS:   Active Problem List with Overview Notes    Diagnosis Date Noted    C. difficile colitis 01/15/2025    Recurrent Clostridioides difficile infection 01/10/2025    Colitis 12/31/2024    Leukocytosis 12/31/2024    Crohn's disease with complication 12/28/2024    Anemia 12/23/2024    Gastroparesis 12/23/2024    Nausea 12/22/2024    Dizziness 12/16/2024    DVT (deep venous thrombosis) 12/06/2024    Panic attacks 12/02/2024    History of DVT (deep vein thrombosis) 11/04/2024 Sept 2024 in [facility]      Acute pain 09/18/2024    Anxiety 09/04/2024    Wound infection 08/28/2024    Open wound of left upper arm 08/21/2024    Sepsis 08/09/2024    Acute renal failure 08/09/2024    Skin abscess 08/09/2024    Proctocolitis 08/04/2024    Depression 06/04/2024    Anticoagulated 04/11/2024    Other specified interstitial pulmonary diseases 01/09/2024    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    Long-term use of " immunosuppressant medication 12/02/2023    Hepatosplenomegaly 11/30/2023    Chest pain 11/17/2023    PUD (peptic ulcer disease) 04/24/2023    Exacerbation of ulcerative colitis without complication 04/20/2023    History of Clostridioides difficile colitis 04/20/2023    Status post fecal microbiota transplant 04/20/2023    Ulcerative colitis 01/11/2023    Chronic disease anemia 01/11/2023    Bilateral nephrolithiasis, non obstructing 01/11/2023    Polyp of colon 06/23/2022    BMI 45.0-49.9, adult 06/23/2022    Type 2 diabetes mellitus without complication, without long-term current use of insulin 01/29/2022    Insomnia 02/03/2021    Morbid obesity 02/03/2021    History of supraventricular tachycardia 02/03/2021    S/P radiofrequency ablation operation for arrhythmia 02/03/2021   49 y.o. male with PMHx 5HLD, DM on ozempic, HTN, LOLIS, umbilical hernia repair, tobacco use, C. diff s/p Vanco / dificid / FMT / rebyota currently on Tremfya last dose December 30th  On last admission patient had long discussion with Colorectal surgery and is seeking subtotal colectomy even if that means for him having a permanent ostomy.  He has an appointment set up with Dr. Jessica Church on February 1st  Stool for C diff is pending as well as repeat calprotectin but patient is still currently on Dificid  zinplava is ordered and patient is scheduled for infusion at [facility].  Prior authorization is still being settled as an outpatient but they have the dosing ready and waiting for him  Colonoscopy/sigmoidoscopy in a.m. to map out disease biopsy for pseudomembranes and have endoscopic assessment prior to surgical conversation  We will also refer patient to tertiary IBD Clinic to assist with management as well    Thank you for this consult.    Odalis Mccabe  1/16/2025  4:59 PM

## 2025-01-16 NOTE — ASSESSMENT & PLAN NOTE
Pt on budesonide caps which will be changed to iv solumedrol at the moment   Iv fluids  Added iv flagyl  Wont add quinolone because it can worsen C diff colitis  Other conservative measures   Consult to GI

## 2025-01-16 NOTE — CONSULTS
[facility]   Department of Infectious Disease  Consult Note        PATIENT NAME: Jacoby Jean-Baptiste  MRN: [id number]  TODAY'S DATE: 01/16/2025  ADMIT DATE: 1/15/2025  LOS: 0 days    CHIEF COMPLAINT: Nausea, Vomiting, and Diarrhea (States he has had N/V that began last night.  Diagnosed with C Diff 1/6/25 hx of UC)      PRINCIPLE PROBLEM: C. difficile colitis    REASON FOR CONSULT:     ASSESSMENT and PLAN   Recent diagnosis of C diff infection.  Still on deficit for this indication.    2. Abdominal pain with nausea and diarrhea.  KUB showed large volume of stool.  Looks like we may now be dealing with constipation associated with spurious diarrhea.  Check CT abdomen and pelvis.  DC metronidazole at this time.  Can continue Dificid for now but anticipate DC if constipation confirmed on CT.     RECOMMENDATIONS:   Discontinue metronidazole   Check CT abdomen and pelvis    Thank you for this consult. Please send ChatStat secure chat with any questions.    Antibiotics (From admission, onward)      Start     Stop Route Frequency Ordered    01/15/25 2100  fidaxomicin tablet 200 mg         -- Oral 2 times daily 01/15/25 1744    01/15/25 1900  metronidazole IVPB 500 mg         -- IV Every 8 hours (non-standard times) 01/15/25 1745          Antifungals (From admission, onward)      None           Antivirals (From admission, onward)      None            HPI      Jacoby Jean-Baptiste is a 50 y.o. male with history of diabetes mellitus, morbid obesity and hypertension.  Also with ulcerative colitis on different medications in the past started Tremfya 12/01/2024 and received a 2nd dose 12/30/2024.  Of note, CT abdomen and pelvis 12/31/2024 showed findings consistent with colitis.      Presented to the ED 01/04/2025 with diarrhea.  KUB read as normal gas pattern.  Stool C diff antigen and PCR were positive and toxin assay was negative.  He was commenced on Dificid.  Improved and was discharged 01/09/2025 on Dificid with plan to  complete a 20 day course followed by 1 tablet daily through ?02/21/2025.    Back in the ED 01/15/2025 with abdominal cramps associated with nausea and vomiting.  CBC and CMP unremarkable.  Chest x-ray with no acute infiltrate.  KUB showed large volume stool throughout the colon with nonspecific gaseous distention of multiple bowel loops.  Dificid was continued on metronidazole added.  ID asked to assist with his care.    He has a previous history of C diff infection in February 20, 2023.  No recent antibiotic intake.      Antibiotic history:    Dificid:  01/06/2025-  Metronidazole:  01/15/2025-    Microbiology:    Stool C diff PCR on antigen positive 1/4/25   Stool C diff toxin 01/04/2025: Negative  Blood culture 01/15/2025: In progress    Social History  Marital Status: Single  Alcohol History:  reports current alcohol use.  Tobacco History:  reports that he has quit smoking. His smoking use included cigarettes. He started smoking about 32 years ago. He has a 30 pack-year smoking history. He has never used smokeless tobacco.  Drug History:  reports that he does not currently use drugs.      Review of patient's allergies indicates:  No Known Allergies  Past Medical History:   Diagnosis Date    C. difficile colitis     Cavitary pneumonia 11/2023    pos aspergillus serology    Diabetes mellitus 01/2022    Hyperlipidemia 2015    Hypertension 2015    Insomnia 2015    Ulcerative colitis      Past Surgical History:   Procedure Laterality Date    BRONCHOSCOPY WITH FLUOROSCOPY Left 11/20/2023    Procedure: BRONCHOSCOPY, WITH FLUOROSCOPY;  Surgeon: Lisa Villarreal MD;  Location: [facility] ENDO;  Service: Pulmonary;  Laterality: Left;    BRONCHOSCOPY WITH FLUOROSCOPY N/A 11/30/2023    Procedure: BRONCHOSCOPY, WITH FLUOROSCOPY;  Surgeon: Lisa Villarreal MD;  Location: [facility] ENDO;  Service: Pulmonary;  Laterality: N/A;    CARDIAC ELECTROPHYSIOLOGY MAPPING AND ABLATION  2017    SVT    CHOLECYSTECTOMY  2011    COLONOSCOPY N/A  5/3/2022    Procedure: COLONOSCOPY;  Surgeon: Shan Jean III, MD;  Location: [facility] ENDO;  Service: Endoscopy;  Laterality: N/A;    COLONOSCOPY N/A 3/16/2024    Procedure: COLONOSCOPY;  Surgeon: ALEKSANDER Ramos MD;  Location: [facility];  Service: Endoscopy;  Laterality: N/A;    COLONOSCOPY WITH FECAL MICROBIOTA TRANSFER N/A 3/21/2023    Procedure: COLONOSCOPY, WITH FECAL MICROBIOTA TRANSFER;  Surgeon: David Millan MD;  Location: [facility];  Service: Endoscopy;  Laterality: N/A;    ESOPHAGOGASTRODUODENOSCOPY N/A 4/24/2023    Procedure: EGD (ESOPHAGOGASTRODUODENOSCOPY);  Surgeon: Shan Jean III, MD;  Location: [facility];  Service: Endoscopy;  Laterality: N/A;    ESOPHAGOGASTRODUODENOSCOPY N/A 6/5/2024    Procedure: EGD (ESOPHAGOGASTRODUODENOSCOPY);  Surgeon: Odalis Mccabe MD;  Location: [facility];  Service: Endoscopy;  Laterality: N/A;    FLEXIBLE SIGMOIDOSCOPY N/A 6/5/2024    Procedure: SIGMOIDOSCOPY, FLEXIBLE;  Surgeon: Odalis Mccabe MD;  Location: [facility];  Service: Endoscopy;  Laterality: N/A;    FLEXIBLE SIGMOIDOSCOPY N/A 7/16/2024    Procedure: SIGMOIDOSCOPY, FLEXIBLE;  Surgeon: Shan Jean III, MD;  Location: [facility];  Service: Endoscopy;  Laterality: N/A;    FLEXIBLE SIGMOIDOSCOPY N/A 8/13/2024    Procedure: SIGMOIDOSCOPY, FLEXIBLE;  Surgeon: Shan Jean III, MD;  Location: [facility];  Service: Endoscopy;  Laterality: N/A;    GANGLION CYST EXCISION Left 1992    UMBILICAL HERNIA REPAIR  2011    with mesh     Family History   Problem Relation Name Age of Onset    Asthma Mother         SUBJECTIVE     Review of systems: 10 system review unremarkable.  As in HPI.     OBJECTIVE   Temp:  [97.7 °F (36.5 °C)-98.3 °F (36.8 °C)] 97.7 °F (36.5 °C)  Pulse:  [77-83] 77  Resp:  [16-18] 18  SpO2:  [92 %-99 %] 99 %  BP: (113-136)/(60-83) 136/82  Temp:  [97.7 °F (36.5 °C)-98.3 °F (36.8 °C)]   Temp: 97.7 °F (36.5 °C) (01/16/25 1153)  Pulse: 77 (01/16/25 1153)  Resp: 18  "(01/16/25 1153)  BP: 136/82 (01/16/25 1153)  SpO2: 99 % (01/16/25 1153)    Intake/Output Summary (Last 24 hours) at 1/16/2025 1540  Last data filed at 1/16/2025 1137  Gross per 24 hour   Intake 580 ml   Output --   Net 580 ml       Physical Exam  General:  Morbidly obese middle-aged man lying quietly in bed in no acute distress   CVS: S1 and 2 heard, no murmurs appreciated   Respiratory: Clear to auscultation   Abdomen: Full, soft, nontender, no palpable organomegaly   Skin: No rash appreciated   CNS: No focal deficits   Musculoskeletal: No joint or bony abnormalities appreciated  Psych: Good mood, normal affect.     VAD:  PIV  ISOLATION:  Isolation for C diff infection    Wounds:  None    Significant Labs: All pertinent labs within the past 24 hours have been reviewed.    CBC LAST 7  Recent Labs   Lab 01/15/25  1608 01/16/25  0505   WBC 11.82 7.71   RBC 4.65 4.11*   HGB 12.3* 10.7*   HCT 40.3 35.7*   MCV 87 87   MCH 26.5* 26.0*   MCHC 30.5* 30.0*   RDW 17.4* 17.3*    358   MPV 9.8 10.2   GRAN 70.8  8.4* 89.5*  6.9   LYMPH 14.1*  1.7 7.7*  0.6*   MONO 8.0  1.0 1.3*  0.1*   BASO 0.11 0.02   NRBC 0 0       CHEMISTRY LAST 7  Recent Labs   Lab 01/15/25  1608 01/16/25  0505   * 135*   K 4.5 5.1    105   CO2 26 25   ANIONGAP 6* 5*   BUN 10 12   CREATININE 1.1 1.1   GLU 90 148*   CALCIUM 9.0 8.6*   MG  --  1.7   ALBUMIN 3.7 3.3*   PROT 7.6 6.6   ALKPHOS 75 67   ALT 8* 6*   AST 10 8*   BILITOT 0.3 0.3       Estimated Creatinine Clearance: 101.2 mL/min (based on SCr of 1.1 mg/dL).    INFLAMMATORY/PROCAL  LAST 7  No results for input(s): "PROCAL", "ESR", "CRP" in the last 168 hours.  No results found for: "ESR"  CRP   Date Value Ref Range Status   12/31/2024 2.80 (H) <1.00 mg/dL Final     Comment:     CRP-Normal Application expected values:   <1.0        mg/dL   Normal Range  1.0 - 5.0  mg/dL   Indicates mild inflammation  5.0 - 10.0 mg/dL   Indicates severe inflammation  >10.0        mg/dL   " Represents serious processes and   frequently         indicates the presence of a bacterial   infection.      12/22/2024 2.10 (H) <1.00 mg/dL Final     Comment:     CRP-Normal Application expected values:   <1.0        mg/dL   Normal Range  1.0 - 5.0  mg/dL   Indicates mild inflammation  5.0 - 10.0 mg/dL   Indicates severe inflammation  >10.0        mg/dL   Represents serious processes and   frequently         indicates the presence of a bacterial   infection.      12/06/2024 1.90 (H) <1.00 mg/dL Final     Comment:     CRP-Normal Application expected values:   <1.0        mg/dL   Normal Range  1.0 - 5.0  mg/dL   Indicates mild inflammation  5.0 - 10.0 mg/dL   Indicates severe inflammation  >10.0        mg/dL   Represents serious processes and   frequently         indicates the presence of a bacterial   infection.      11/01/2024 2.90 (H) <1.00 mg/dL Final     Comment:     CRP-Normal Application expected values:   <1.0        mg/dL   Normal Range  1.0 - 5.0  mg/dL   Indicates mild inflammation  5.0 - 10.0 mg/dL   Indicates severe inflammation  >10.0        mg/dL   Represents serious processes and   frequently         indicates the presence of a bacterial   infection.      08/11/2024 4.20 (H) <1.00 mg/dL Final     Comment:     CRP-Normal Application expected values:   <1.0        mg/dL   Normal Range  1.0 - 5.0  mg/dL   Indicates mild inflammation  5.0 - 10.0 mg/dL   Indicates severe inflammation  >10.0        mg/dL   Represents serious processes and   frequently         indicates the presence of a bacterial   infection.      07/15/2024 1.80 (H) <1.00 mg/dL Final     Comment:     CRP-Normal Application expected values:   <1.0        mg/dL   Normal Range  1.0 - 5.0  mg/dL   Indicates mild inflammation  5.0 - 10.0 mg/dL   Indicates severe inflammation  >10.0        mg/dL   Represents serious processes and   frequently         indicates the presence of a bacterial   infection.      07/12/2024 1.30 (H) <1.00 mg/dL Final      Comment:     CRP-Normal Application expected values:   <1.0        mg/dL   Normal Range  1.0 - 5.0  mg/dL   Indicates mild inflammation  5.0 - 10.0 mg/dL   Indicates severe inflammation  >10.0        mg/dL   Represents serious processes and   frequently         indicates the presence of a bacterial   infection.          PRIOR  MICROBIOLOGY:  Reviewed    No results found for the last 90 days.      LAST 7 DAYS MICROBIOLOGY   Microbiology Results (last 7 days)       Procedure Component Value Units Date/Time    Blood culture [[id number]] Collected: 01/16/25 0505    Order Status: Completed Specimen: Blood Updated: 01/16/25 1317     Blood Culture, Routine No Growth to date    Stool culture [[id number]] Collected: 01/16/25 1045    Order Status: Sent Specimen: Stool Updated: 01/16/25 1053          CURRENT/PREVIOUS VISIT EKG  Results for orders placed or performed during the hospital encounter of 01/15/25   EKG 12-lead    Collection Time: 01/16/25  9:40 AM   Result Value Ref Range    QRS Duration 76 ms    OHS QTC Calculation 443 ms    Narrative    Test Reason : R07.9,    Vent. Rate :  76 BPM     Atrial Rate :  76 BPM     P-R Int : 156 ms          QRS Dur :  76 ms      QT Int : 394 ms       P-R-T Axes :  42  23  36 degrees    QTcB Int : 443 ms    Normal sinus rhythm  Normal ECG  No previous ECGs available    Referred By: AAAREFERRAL SELF           Confirmed By:      Significant Imaging: I have reviewed all relevant and available imaging results/findings within the past 24 hours.    I spent a total of 60 minutes on the day of the visit.This includes face to face time and non-face to face time preparing to see the patient (eg, review of tests), obtaining and/or reviewing separately obtained history, documenting clinical information in the electronic or other health record, independently interpreting results and communicating results to the patient/family/caregiver, or care coordinator.    Toan Dickinson MD  Date of  Service: 01/16/2025      This note was created using ViS voice recognition software that occasionally misinterpreted phrases or words.

## 2025-01-16 NOTE — PLAN OF CARE
[facility]  Initial Discharge Assessment       Primary Care Provider: Hunter Aguilar III, MD    Admission Diagnosis: C. difficile colitis [A04.72]    Admission Date: 1/15/2025  Expected Discharge Date: 1/18/2025    Transition of Care Barriers: None    Payor: [employer] / Plan: [employer] ([employer]) / Product Type: Commercial /     Extended Emergency Contact Information  Primary Emergency Contact: Estiven (Sister-In-Law)Jessica  Address: [address] [place] [address], [place] [address] [facility]  Home Phone: [phone]  Mobile Phone: [phone]  Relation: Sister  Preferred language: English   needed? No  Secondary Emergency Contact: Dallas Jean-Baptiste   [place]  Mobile Phone: [phone]  Relation: Brother  Preferred language: English   needed? No    Discharge Plan A: Home Health  Discharge Plan B: Home      [facility]  [address]  [facility] [address]  Phone: [phone] Fax: [phone]    [facility] - [place], [place] - [address]  [address]  [place] [address]  Phone: [phone] Fax: [phone]    [id number] - [place], [place] - [address] [facility] [address] [facility] [address]  [address]  [place] [address]  Phone: [phone] Fax: [phone]    DC assessment completed at bedside with pt, information on facesheet verified. Lives at listed address alone and plans to drive self home. Emergency contacts verified. PCP is Dr. Aguilar. Pharm is [facility]. Takes xarelto. Active with [facility]. Denies hd/dme. Insurance verified. Independent at baseline. Denies POA. Recent admission 2 weeks ago. CM following for DC planning.     Initial Assessment (most recent)       Adult Discharge Assessment - 01/16/25 1058          Discharge Assessment    Assessment Type Discharge Planning Assessment     Confirmed/corrected address, phone number  and insurance Yes     Confirmed Demographics Correct on Facesheet     Source of Information patient     Does patient/caregiver understand observation status Yes     Communicated GERARDO with patient/caregiver Yes     People in Home alone     Facility Arrived From: home     Do you expect to return to your current living situation? Yes     Do you have help at home or someone to help you manage your care at home? No     Prior to hospitilization cognitive status: Alert/Oriented     Current cognitive status: Alert/Oriented     Equipment Currently Used at Home none     Readmission within 30 days? Yes     Patient currently being followed by outpatient case management? No     Do you currently have service(s) that help you manage your care at home? Yes     Name and Contact number of agency [facility]     Is the pt/caregiver preference to resume services with current agency Yes     Do you take prescription medications? Yes     Do you have prescription coverage? Yes     Do you have any problems affording any of your prescribed medications? No     Is the patient taking medications as prescribed? yes     Who is going to help you get home at discharge? self     How do you get to doctors appointments? car, drives self     Are you on dialysis? No     Do you take coumadin? No     Discharge Plan A Home Health     Discharge Plan B Home     DME Needed Upon Discharge  none     Discharge Plan discussed with: Patient     Transition of Care Barriers None

## 2025-01-16 NOTE — ASSESSMENT & PLAN NOTE
Pt on budesonide caps which will be changed to iv solumedrol at the moment   Iv fluids  Added iv flagyl  Wont add quinolone because it can worsen C diff colitis  Other conservative measures

## 2025-01-16 NOTE — SUBJECTIVE & OBJECTIVE
Interval History:  Patient evaluated at bedside.  No nausea and vomiting at present.  Does report abdominal pain and epigastric pain.  Still with diarrhea.    Review of Systems   Constitutional:  Positive for activity change and appetite change. Negative for chills and fever.   Cardiovascular:  Positive for chest pain.   Gastrointestinal:  Positive for abdominal pain and diarrhea. Negative for nausea and vomiting.     Objective:     Vital Signs (Most Recent):  Temp: 97.7 °F (36.5 °C) (01/16/25 1153)  Pulse: 77 (01/16/25 1153)  Resp: 18 (01/16/25 1153)  BP: 136/82 (01/16/25 1153)  SpO2: 99 % (01/16/25 1153) Vital Signs (24h Range):  Temp:  [97.7 °F (36.5 °C)-98.7 °F (37.1 °C)] 97.7 °F (36.5 °C)  Pulse:  [77-92] 77  Resp:  [16-18] 18  SpO2:  [92 %-100 %] 99 %  BP: (107-151)/(60-83) 136/82     Weight: 133 kg (293 lb 3.4 oz)  Body mass index is 48.79 kg/m².    Intake/Output Summary (Last 24 hours) at 1/16/2025 1339  Last data filed at 1/16/2025 1137  Gross per 24 hour   Intake 580 ml   Output --   Net 580 ml         Physical Exam  Constitutional:       Appearance: Normal appearance. He is obese. He is not toxic-appearing.   HENT:      Nose: Nose normal.      Mouth/Throat:      Mouth: Mucous membranes are dry.      Pharynx: Oropharynx is clear.   Eyes:      Extraocular Movements: Extraocular movements intact.      Conjunctiva/sclera: Conjunctivae normal.   Cardiovascular:      Rate and Rhythm: Normal rate and regular rhythm.      Pulses: Normal pulses.      Heart sounds: Normal heart sounds.   Pulmonary:      Effort: Pulmonary effort is normal.      Breath sounds: Normal breath sounds.   Abdominal:      General: Bowel sounds are normal. There is no distension.      Palpations: Abdomen is soft.      Tenderness: There is abdominal tenderness. There is no guarding.   Skin:     General: Skin is warm.      Capillary Refill: Capillary refill takes less than 2 seconds.   Neurological:      Mental Status: He is alert and  oriented to person, place, and time.   Psychiatric:         Mood and Affect: Mood normal.             Significant Labs: All pertinent labs within the past 24 hours have been reviewed.  Recent Lab Results         01/16/25  0937   01/16/25  0505   01/15/25  1608   01/15/25  1556        Albumin   3.3   3.7         ALP   67   75         ALT   6   8         Anion Gap   5   6         Appearance, UA       Clear       AST   8   10         Baso #   0.02   0.11         Basophil %   0.3   0.9         Bilirubin (UA)       Negative       BILIRUBIN TOTAL   0.3  Comment: For infants and newborns, interpretation of results should be based  on gestational age, weight and in agreement with clinical  observations.    Premature Infant recommended reference ranges:  Up to 24 hours.............<8.0 mg/dL  Up to 48 hours............<12.0 mg/dL  3-5 days..................<15.0 mg/dL  6-29 days.................<15.0 mg/dL     0.3  Comment: For infants and newborns, interpretation of results should be based  on gestational age, weight and in agreement with clinical  observations.    Premature Infant recommended reference ranges:  Up to 24 hours.............<8.0 mg/dL  Up to 48 hours............<12.0 mg/dL  3-5 days..................<15.0 mg/dL  6-29 days.................<15.0 mg/dL           Blood Culture, Routine   No Growth to date  [P]           BUN   12   10         Calcium   8.6   9.0         Chloride   105   103         CO2   25   26         Color, UA       Yellow       Creatinine   1.1   1.1         Differential Method   Automated   Automated         eGFR   >60.0   >60.0         Eos #   0.0   0.7         Eos %   0.3   5.8         Glucose   148   90         Glucose, UA       Negative       Gran # (ANC)   6.9   8.4         Gran %   89.5   70.8         Hematocrit   35.7   40.3         Hemoglobin   10.7   12.3         Immature Grans (Abs)   0.07  Comment: Mild elevation in immature granulocytes is non specific and   can be seen in a  variety of conditions including stress response,   acute inflammation, trauma and pregnancy. Correlation with other   laboratory and clinical findings is essential.     0.05  Comment: Mild elevation in immature granulocytes is non specific and   can be seen in a variety of conditions including stress response,   acute inflammation, trauma and pregnancy. Correlation with other   laboratory and clinical findings is essential.           Immature Granulocytes   0.9   0.4         Ketones, UA       Negative       Leukocyte Esterase, UA       Negative       Lipase 3     6         Lymph #   0.6   1.7         Lymph %   7.7   14.1         Magnesium    1.7           MCH   26.0   26.5         MCHC   30.0   30.5         MCV   87   87         Mono #   0.1   1.0         Mono %   1.3   8.0         MPV   10.2   9.8         NITRITE UA       Negative       nRBC   0   0         Blood, UA       Negative       pH, UA       6.0       Platelet Count   358   412         Potassium   5.1   4.5         PROTEIN TOTAL   6.6   7.6         Protein, UA       Negative  Comment: Recommend a 24 hour urine protein or a urine   protein/creatinine ratio if globulin induced proteinuria is  clinically suspected.         RBC   4.11   4.65         RDW   17.3   17.4         Sodium   135   135         Spec Grav UA       1.020       Specimen UA       Urine, Clean Catch       Troponin I High Sensitivity 2.7  Comment: Troponin results differ between methods. Do not use   results between Troponin methods interchangeably.    Alkaline Phospatase levels above 400 U/L may   cause false positive results.    Access hsTnI should not be used for patients taking   Asfotase erika (Strensiq).               UROBILINOGEN UA       Negative       WBC   7.71   11.82                  [P] - Preliminary Result               Significant Imaging: I have reviewed all pertinent imaging results/findings within the past 24 hours.

## 2025-01-16 NOTE — HOSPITAL COURSE
The patient was admitted to the hospital medicine service and was monitored closely.  The patient was started on fidaxomicin and Flagyl.  The patient was also given IV steroids for possible UC flare.  Infectious Disease and GI were consulted.  The patient was started on Dificid for C difficile.  He was given IV pain medications for abdominal pain.  He was taken for a colonoscopy and sigmoidoscopy for biopsy and mapping for future possible colectomy with Dr. Monroy outpatient.  Colonoscopy revealed UC involvement of the rectum, sigmoid colon, descending colon and transverse colon with the right side of the colon spared.  The GI, patient can be discharged home on p.o. budesonide as well as Dificid for 10 days.  He is to follow up with GI, Infectious Disease and PCP in 1-2 weeks.  He is also to follow up with Colorectal surgery for evaluation for colectomy.  Return to the ER for any concerning symptoms.

## 2025-01-16 NOTE — SUBJECTIVE & OBJECTIVE
Past Medical History:   Diagnosis Date    C. difficile colitis     Cavitary pneumonia 11/2023    pos aspergillus serology    Diabetes mellitus 01/2022    Hyperlipidemia 2015    Hypertension 2015    Insomnia 2015    Ulcerative colitis        Past Surgical History:   Procedure Laterality Date    BRONCHOSCOPY WITH FLUOROSCOPY Left 11/20/2023    Procedure: BRONCHOSCOPY, WITH FLUOROSCOPY;  Surgeon: Lisa Villarreal MD;  Location: [facility];  Service: Pulmonary;  Laterality: Left;    BRONCHOSCOPY WITH FLUOROSCOPY N/A 11/30/2023    Procedure: BRONCHOSCOPY, WITH FLUOROSCOPY;  Surgeon: Lisa Villarreal MD;  Location: [facility];  Service: Pulmonary;  Laterality: N/A;    CARDIAC ELECTROPHYSIOLOGY MAPPING AND ABLATION  2017    SVT    CHOLECYSTECTOMY  2011    COLONOSCOPY N/A 5/3/2022    Procedure: COLONOSCOPY;  Surgeon: Shan Jean III, MD;  Location: [facility];  Service: Endoscopy;  Laterality: N/A;    COLONOSCOPY N/A 3/16/2024    Procedure: COLONOSCOPY;  Surgeon: ALEKSANDER Ramos MD;  Location: [facility];  Service: Endoscopy;  Laterality: N/A;    COLONOSCOPY WITH FECAL MICROBIOTA TRANSFER N/A 3/21/2023    Procedure: COLONOSCOPY, WITH FECAL MICROBIOTA TRANSFER;  Surgeon: David Millan MD;  Location: [facility];  Service: Endoscopy;  Laterality: N/A;    ESOPHAGOGASTRODUODENOSCOPY N/A 4/24/2023    Procedure: EGD (ESOPHAGOGASTRODUODENOSCOPY);  Surgeon: Shan Jean III, MD;  Location: [facility];  Service: Endoscopy;  Laterality: N/A;    ESOPHAGOGASTRODUODENOSCOPY N/A 6/5/2024    Procedure: EGD (ESOPHAGOGASTRODUODENOSCOPY);  Surgeon: Odalis Mccabe MD;  Location: [facility];  Service: Endoscopy;  Laterality: N/A;    FLEXIBLE SIGMOIDOSCOPY N/A 6/5/2024    Procedure: SIGMOIDOSCOPY, FLEXIBLE;  Surgeon: Odalis Mccabe MD;  Location: [facility];  Service: Endoscopy;  Laterality: N/A;    FLEXIBLE SIGMOIDOSCOPY N/A 7/16/2024    Procedure: SIGMOIDOSCOPY, FLEXIBLE;  Surgeon: Shan Jean III, MD;   Location: [facility];  Service: Endoscopy;  Laterality: N/A;    FLEXIBLE SIGMOIDOSCOPY N/A 8/13/2024    Procedure: SIGMOIDOSCOPY, FLEXIBLE;  Surgeon: Shan Jean III, MD;  Location: [facility];  Service: Endoscopy;  Laterality: N/A;    GANGLION CYST EXCISION Left 1992    UMBILICAL HERNIA REPAIR  2011    with mesh       Review of patient's allergies indicates:  No Known Allergies    Current Facility-Administered Medications on File Prior to Encounter   Medication    lactated ringers infusion     Current Outpatient Medications on File Prior to Encounter   Medication Sig    budesonide (ENTOCORT EC) 3 mg capsule Take 3 capsules (9 mg total) by mouth once daily.    busPIRone (BUSPAR) 15 MG tablet Take 1 tablet (15 mg total) by mouth 3 (three) times daily.    colchicine (COLCRYS) 0.6 mg tablet Take 1 tablet (0.6 mg total) by mouth 2 (two) times daily.    diphenoxylate-atropine 2.5-0.025 mg (LOMOTIL) 2.5-0.025 mg per tablet Take 1 tablet by mouth 4 (four) times daily as needed for Diarrhea.    EScitalopram oxalate (LEXAPRO) 20 MG tablet Take 1 tablet (20 mg total) by mouth once daily.    fidaxomicin (DIFICID) 200 mg Tab Take 1 tablet (200 mg total) by mouth 2 (two) times daily. for 9 days    HYDROcodone-acetaminophen (NORCO)  mg per tablet Take 1 tablet by mouth every 6 (six) hours as needed for Pain.    hyoscyamine (LEVBID) 0.375 mg Tb12 Take 0.375 mg by mouth 2 (two) times daily.    Lactobac 2-Bifido 1-S. therm (VSL#3) 112.5 billion cell Cap Take 1 capsule by mouth twice a day    LORazepam (ATIVAN) 0.5 MG tablet Take 1 tablet (0.5 mg total) by mouth every 6 (six) hours as needed for Anxiety.    magnesium oxide (MAG-OX) 400 mg (241.3 mg magnesium) tablet Take 1 tablet (400 mg total) by mouth 2 (two) times daily.    metoclopramide HCl (REGLAN) 5 MG tablet Take 1 tablet (5 mg total) by mouth every 8 (eight) hours as needed (take as needed before meals).    metoprolol succinate (TOPROL-XL) 50 MG 24 hr tablet  Take 1 tablet (50 mg total) by mouth once daily.    promethazine (PHENERGAN) 25 MG tablet Take 25 mg by mouth 4 (four) times daily as needed for Nausea.    rivaroxaban (XARELTO) 20 mg Tab Take 1 tablet (20 mg total) by mouth daily with dinner or evening meal.    simvastatin (ZOCOR) 20 MG tablet Take 1 tablet (20 mg total) by mouth every evening.    zolpidem (AMBIEN) 10 mg Tab Take 1 tablet (10 mg total) by mouth nightly as needed (insomnia).    bezlotoxumab (ZINPLAVA) 25 mg/mL Soln injection Inject 10 mg/kg intravenously single dose, during antibacterial treatment (Patient not taking: Reported on 1/15/2025)    pantoprazole (PROTONIX) 40 MG tablet Take 40 mg by mouth 2 (two) times daily. (Patient not taking: Reported on 1/15/2025)     Family History       Problem Relation (Age of Onset)    Asthma Mother          Tobacco Use    Smoking status: Former     Average packs/day: 1 pack/day for 30.0 years (30.0 ttl pk-yrs)     Types: Cigarettes     Start date: 1993    Smokeless tobacco: Never    Tobacco comments:     Pt states he has stopped smoking with Chantix three weeks ago. 12/1/23   Substance and Sexual Activity    Alcohol use: Yes     Comment: ocass    Drug use: Not Currently    Sexual activity: Not Currently     Review of Systems   Constitutional:  Negative for activity change and appetite change.   HENT:  Negative for congestion and dental problem.    Eyes:  Negative for discharge and itching.   Respiratory:  Negative for shortness of breath.    Cardiovascular:  Negative for chest pain.   Gastrointestinal:  Positive for abdominal pain, nausea and vomiting. Negative for abdominal distention.   Endocrine: Negative for cold intolerance.   Genitourinary:  Negative for difficulty urinating and dysuria.   Musculoskeletal:  Negative for arthralgias and back pain.   Skin:  Negative for color change.   Neurological:  Negative for dizziness and facial asymmetry.   Hematological:  Negative for adenopathy.    Psychiatric/Behavioral:  Negative for agitation and behavioral problems.      Objective:     Vital Signs (Most Recent):  Temp: 98.3 °F (36.8 °C) (01/15/25 2005)  Pulse: 81 (01/15/25 2005)  Resp: 18 (01/15/25 2005)  BP: 135/83 (01/15/25 2005)  SpO2: 97 % (01/15/25 2005) Vital Signs (24h Range):  Temp:  [98 °F (36.7 °C)-98.7 °F (37.1 °C)] 98.3 °F (36.8 °C)  Pulse:  [81-92] 81  Resp:  [18] 18  SpO2:  [96 %-100 %] 97 %  BP: (107-151)/(60-83) 135/83     Weight: 133 kg (293 lb 3.4 oz)  Body mass index is 48.79 kg/m².     Physical Exam  Vitals and nursing note reviewed.   Constitutional:       Appearance: He is well-developed.   HENT:      Head: Atraumatic.      Right Ear: External ear normal.      Left Ear: External ear normal.      Nose: Nose normal.      Mouth/Throat:      Mouth: Mucous membranes are moist.   Eyes:      Extraocular Movements: Extraocular movements intact.   Cardiovascular:      Rate and Rhythm: Normal rate.   Pulmonary:      Effort: Pulmonary effort is normal.   Abdominal:      Palpations: Abdomen is soft.   Musculoskeletal:         General: Normal range of motion.      Cervical back: Full passive range of motion without pain and normal range of motion.   Skin:     General: Skin is warm.   Neurological:      Mental Status: He is alert and oriented to person, place, and time.   Psychiatric:         Behavior: Behavior normal.                Significant Labs: All pertinent labs within the past 24 hours have been reviewed.  CBC:   Recent Labs   Lab 01/15/25  1608   WBC 11.82   HGB 12.3*   HCT 40.3        CMP:   Recent Labs   Lab 01/15/25  1608   *   K 4.5      CO2 26   GLU 90   BUN 10   CREATININE 1.1   CALCIUM 9.0   PROT 7.6   ALBUMIN 3.7   BILITOT 0.3   ALKPHOS 75   AST 10   ALT 8*   ANIONGAP 6*       Significant Imaging: I have reviewed all pertinent imaging results/findings within the past 24 hours.

## 2025-01-16 NOTE — ASSESSMENT & PLAN NOTE
Resolved with GI cocktail  EKG with normal sinus rhythm no ischemic changes  Troponin negative, 2nd troponin pending  Chest x-ray without acute findings

## 2025-01-16 NOTE — PHARMACY MED REC
"              .        Admission Medication History     The home medication history was taken by Yu Holloway.    You may go to "Admission" then "Reconcile Home Medications" tabs to review and/or act upon these items.     The home medication list has been updated by the Pharmacy department.   Please read ALL comments highlighted in yellow.   Please address this information as you see fit.    Feel free to contact us if you have any questions or require assistance.          Medications listed below were obtained from: Patient/family and Analytic software- [employer]  Current Facility-Administered Medications on File Prior to Encounter   Medication Dose Route Frequency Provider Last Rate Last Admin    lactated ringers infusion   Intravenous Continuous David Millan MD 75 mL/hr at 03/21/23 1252 New Bag at 03/21/23 1252     Current Outpatient Medications on File Prior to Encounter   Medication Sig Dispense Refill    budesonide (ENTOCORT EC) 3 mg capsule Take 3 capsules (9 mg total) by mouth once daily. 180 each 0    busPIRone (BUSPAR) 15 MG tablet Take 1 tablet (15 mg total) by mouth 3 (three) times daily. 90 tablet 5    colchicine (COLCRYS) 0.6 mg tablet Take 1 tablet (0.6 mg total) by mouth 2 (two) times daily. 180 tablet 0    diphenoxylate-atropine 2.5-0.025 mg (LOMOTIL) 2.5-0.025 mg per tablet Take 1 tablet by mouth 4 (four) times daily as needed for Diarrhea. 40 tablet 0    EScitalopram oxalate (LEXAPRO) 20 MG tablet Take 1 tablet (20 mg total) by mouth once daily. 90 tablet 0    fidaxomicin (DIFICID) 200 mg Tab Take 1 tablet (200 mg total) by mouth 2 (two) times daily. for 9 days 18 tablet 0    HYDROcodone-acetaminophen (NORCO)  mg per tablet Take 1 tablet by mouth every 6 (six) hours as needed for Pain. 28 tablet 0    hyoscyamine (LEVBID) 0.375 mg Tb12 Take 0.375 mg by mouth 2 (two) times daily.      Lactobac 2-Bifido 1-S. therm (VSL#3) 112.5 billion cell Cap Take 1 capsule by mouth twice a day 60 " capsule 0    LORazepam (ATIVAN) 0.5 MG tablet Take 1 tablet (0.5 mg total) by mouth every 6 (six) hours as needed for Anxiety. 90 tablet 5    magnesium oxide (MAG-OX) 400 mg (241.3 mg magnesium) tablet Take 1 tablet (400 mg total) by mouth 2 (two) times daily. 28 tablet 0    metoclopramide HCl (REGLAN) 5 MG tablet Take 1 tablet (5 mg total) by mouth every 8 (eight) hours as needed (take as needed before meals). 90 tablet 0    metoprolol succinate (TOPROL-XL) 50 MG 24 hr tablet Take 1 tablet (50 mg total) by mouth once daily. 90 tablet 1    promethazine (PHENERGAN) 25 MG tablet Take 25 mg by mouth 4 (four) times daily as needed for Nausea.      rivaroxaban (XARELTO) 20 mg Tab Take 1 tablet (20 mg total) by mouth daily with dinner or evening meal. 90 tablet 0    simvastatin (ZOCOR) 20 MG tablet Take 1 tablet (20 mg total) by mouth every evening. 90 tablet 1    zolpidem (AMBIEN) 10 mg Tab Take 1 tablet (10 mg total) by mouth nightly as needed (insomnia). 30 tablet 2    bezlotoxumab (ZINPLAVA) 25 mg/mL Soln injection Inject 10 mg/kg intravenously single dose, during antibacterial treatment (Patient not taking: Reported on 1/15/2025) 40 mL 0    pantoprazole (PROTONIX) 40 MG tablet Take 40 mg by mouth 2 (two) times daily. (Patient not taking: Reported on 1/15/2025)         Potential issues to be addressed PRIOR TO DISCHARGE  Patient reported not taking the following medications: (Zinplava & Pantoprazole). These medications remain on the home medication list. Please address accordingly.     Yu Holloway  EXT 1924

## 2025-01-16 NOTE — PLAN OF CARE
Discharge Planning    Bedside rounds completed with KERWIN. Patient is here with C. Diff. Patient was diagnosed with C. Dif on previous admission. Pt had a recent discharge on 01/09/25. Patient is on PO fidaxomicin out patient.     Patient is now reporting chest pain and undergoing chest pain work up. GI (re: UC and C Diff) and ID  (re: recurrent C Diff) consult has been placed and patient is pending evaluation.     Disposition: Home    GERARDO: 01/17/25

## 2025-01-17 ENCOUNTER — ANESTHESIA (OUTPATIENT)
Dept: SURGERY | Facility: HOSPITAL | Age: 50
DRG: 386 | End: 2025-01-17
Payer: COMMERCIAL

## 2025-01-17 ENCOUNTER — ANESTHESIA EVENT (OUTPATIENT)
Dept: SURGERY | Facility: HOSPITAL | Age: 50
DRG: 386 | End: 2025-01-17
Payer: COMMERCIAL

## 2025-01-17 VITALS
DIASTOLIC BLOOD PRESSURE: 61 MMHG | RESPIRATION RATE: 16 BRPM | HEART RATE: 75 BPM | OXYGEN SATURATION: 100 % | SYSTOLIC BLOOD PRESSURE: 131 MMHG

## 2025-01-17 LAB
ALBUMIN SERPL BCP-MCNC: 3.5 G/DL (ref 3.5–5.2)
ALP SERPL-CCNC: 65 U/L (ref 55–135)
ALT SERPL W/O P-5'-P-CCNC: 6 U/L (ref 10–44)
ANION GAP SERPL CALC-SCNC: 7 MMOL/L (ref 8–16)
AST SERPL-CCNC: 11 U/L (ref 10–40)
BASOPHILS # BLD AUTO: 0.03 K/UL (ref 0–0.2)
BASOPHILS NFR BLD: 0.3 % (ref 0–1.9)
BILIRUB SERPL-MCNC: 0.3 MG/DL (ref 0.1–1)
BUN SERPL-MCNC: 9 MG/DL (ref 6–20)
CALCIUM SERPL-MCNC: 8.8 MG/DL (ref 8.7–10.5)
CHLORIDE SERPL-SCNC: 102 MMOL/L (ref 95–110)
CO2 SERPL-SCNC: 26 MMOL/L (ref 23–29)
CREAT SERPL-MCNC: 1.1 MG/DL (ref 0.5–1.4)
DIFFERENTIAL METHOD BLD: ABNORMAL
EOSINOPHIL # BLD AUTO: 0 K/UL (ref 0–0.5)
EOSINOPHIL NFR BLD: 0.3 % (ref 0–8)
ERYTHROCYTE [DISTWIDTH] IN BLOOD BY AUTOMATED COUNT: 17.6 % (ref 11.5–14.5)
EST. GFR  (NO RACE VARIABLE): >60 ML/MIN/1.73 M^2
GLUCOSE SERPL-MCNC: 136 MG/DL (ref 70–110)
HCT VFR BLD AUTO: 36.9 % (ref 40–54)
HGB BLD-MCNC: 10.9 G/DL (ref 14–18)
IMM GRANULOCYTES # BLD AUTO: 0.09 K/UL (ref 0–0.04)
IMM GRANULOCYTES NFR BLD AUTO: 1 % (ref 0–0.5)
LYMPHOCYTES # BLD AUTO: 0.7 K/UL (ref 1–4.8)
LYMPHOCYTES NFR BLD: 7.9 % (ref 18–48)
MAGNESIUM SERPL-MCNC: 1.7 MG/DL (ref 1.6–2.6)
MCH RBC QN AUTO: 26.2 PG (ref 27–31)
MCHC RBC AUTO-ENTMCNC: 29.5 G/DL (ref 32–36)
MCV RBC AUTO: 89 FL (ref 82–98)
MONOCYTES # BLD AUTO: 0.2 K/UL (ref 0.3–1)
MONOCYTES NFR BLD: 2.5 % (ref 4–15)
NEUTROPHILS # BLD AUTO: 7.7 K/UL (ref 1.8–7.7)
NEUTROPHILS NFR BLD: 88 % (ref 38–73)
NRBC BLD-RTO: 0 /100 WBC
PLATELET # BLD AUTO: 343 K/UL (ref 150–450)
PMV BLD AUTO: 10 FL (ref 9.2–12.9)
POTASSIUM SERPL-SCNC: 4.5 MMOL/L (ref 3.5–5.1)
PROT SERPL-MCNC: 7 G/DL (ref 6–8.4)
RBC # BLD AUTO: 4.16 M/UL (ref 4.6–6.2)
SODIUM SERPL-SCNC: 135 MMOL/L (ref 136–145)
WBC # BLD AUTO: 8.77 K/UL (ref 3.9–12.7)

## 2025-01-17 PROCEDURE — 99233 SBSQ HOSP IP/OBS HIGH 50: CPT | Mod: ,,, | Performed by: INTERNAL MEDICINE

## 2025-01-17 PROCEDURE — 63600175 PHARM REV CODE 636 W HCPCS: Performed by: INTERNAL MEDICINE

## 2025-01-17 PROCEDURE — 37000009 HC ANESTHESIA EA ADD 15 MINS: Performed by: INTERNAL MEDICINE

## 2025-01-17 PROCEDURE — 37000008 HC ANESTHESIA 1ST 15 MINUTES: Performed by: INTERNAL MEDICINE

## 2025-01-17 PROCEDURE — 12000002 HC ACUTE/MED SURGE SEMI-PRIVATE ROOM

## 2025-01-17 PROCEDURE — 25000003 PHARM REV CODE 250: Performed by: NURSE ANESTHETIST, CERTIFIED REGISTERED

## 2025-01-17 PROCEDURE — 63600175 PHARM REV CODE 636 W HCPCS: Performed by: NURSE ANESTHETIST, CERTIFIED REGISTERED

## 2025-01-17 PROCEDURE — 25000003 PHARM REV CODE 250: Performed by: INTERNAL MEDICINE

## 2025-01-17 PROCEDURE — 63600175 PHARM REV CODE 636 W HCPCS: Performed by: NURSE PRACTITIONER

## 2025-01-17 PROCEDURE — 0DBK8ZX EXCISION OF ASCENDING COLON, VIA NATURAL OR ARTIFICIAL OPENING ENDOSCOPIC, DIAGNOSTIC: ICD-10-PCS | Performed by: INTERNAL MEDICINE

## 2025-01-17 PROCEDURE — 27200043 HC FORCEPS, BIOPSY: Performed by: INTERNAL MEDICINE

## 2025-01-17 PROCEDURE — 85025 COMPLETE CBC W/AUTO DIFF WBC: CPT | Performed by: INTERNAL MEDICINE

## 2025-01-17 PROCEDURE — 27000207 HC ISOLATION

## 2025-01-17 PROCEDURE — 83735 ASSAY OF MAGNESIUM: CPT | Performed by: INTERNAL MEDICINE

## 2025-01-17 PROCEDURE — 0DBN8ZX EXCISION OF SIGMOID COLON, VIA NATURAL OR ARTIFICIAL OPENING ENDOSCOPIC, DIAGNOSTIC: ICD-10-PCS | Performed by: INTERNAL MEDICINE

## 2025-01-17 PROCEDURE — 45380 COLONOSCOPY AND BIOPSY: CPT | Performed by: INTERNAL MEDICINE

## 2025-01-17 PROCEDURE — 36415 COLL VENOUS BLD VENIPUNCTURE: CPT | Performed by: INTERNAL MEDICINE

## 2025-01-17 PROCEDURE — 96376 TX/PRO/DX INJ SAME DRUG ADON: CPT

## 2025-01-17 PROCEDURE — 80053 COMPREHEN METABOLIC PANEL: CPT | Performed by: INTERNAL MEDICINE

## 2025-01-17 PROCEDURE — 96361 HYDRATE IV INFUSION ADD-ON: CPT

## 2025-01-17 PROCEDURE — 0DBM8ZX EXCISION OF DESCENDING COLON, VIA NATURAL OR ARTIFICIAL OPENING ENDOSCOPIC, DIAGNOSTIC: ICD-10-PCS | Performed by: INTERNAL MEDICINE

## 2025-01-17 RX ORDER — PROPOFOL 10 MG/ML
VIAL (ML) INTRAVENOUS
Status: DISCONTINUED | OUTPATIENT
Start: 2025-01-17 | End: 2025-01-17

## 2025-01-17 RX ADMIN — ONDANSETRON 4 MG: 2 INJECTION INTRAMUSCULAR; INTRAVENOUS at 09:01

## 2025-01-17 RX ADMIN — RIVAROXABAN 20 MG: 20 TABLET, FILM COATED ORAL at 05:01

## 2025-01-17 RX ADMIN — PROPOFOL 30 MG: 10 INJECTION, EMULSION INTRAVENOUS at 02:01

## 2025-01-17 RX ADMIN — MORPHINE SULFATE 4 MG: 4 INJECTION, SOLUTION INTRAMUSCULAR; INTRAVENOUS at 09:01

## 2025-01-17 RX ADMIN — SODIUM CHLORIDE: 9 INJECTION, SOLUTION INTRAVENOUS at 10:01

## 2025-01-17 RX ADMIN — PROPOFOL 100 MG: 10 INJECTION, EMULSION INTRAVENOUS at 02:01

## 2025-01-17 RX ADMIN — FIDAXOMICIN 200 MG: 200 TABLET, FILM COATED ORAL at 10:01

## 2025-01-17 RX ADMIN — MORPHINE SULFATE 4 MG: 4 INJECTION, SOLUTION INTRAMUSCULAR; INTRAVENOUS at 11:01

## 2025-01-17 RX ADMIN — FAMOTIDINE 20 MG: 20 TABLET, FILM COATED ORAL at 09:01

## 2025-01-17 RX ADMIN — MORPHINE SULFATE 4 MG: 4 INJECTION, SOLUTION INTRAMUSCULAR; INTRAVENOUS at 05:01

## 2025-01-17 RX ADMIN — SODIUM CHLORIDE: 0.9 INJECTION, SOLUTION INTRAVENOUS at 02:01

## 2025-01-17 RX ADMIN — BUSPIRONE HYDROCHLORIDE 15 MG: 5 TABLET ORAL at 09:01

## 2025-01-17 RX ADMIN — ZOLPIDEM TARTRATE 10 MG: 5 TABLET, FILM COATED ORAL at 09:01

## 2025-01-17 RX ADMIN — METHYLPREDNISOLONE SODIUM SUCCINATE 60 MG: 40 INJECTION, POWDER, FOR SOLUTION INTRAMUSCULAR; INTRAVENOUS at 09:01

## 2025-01-17 RX ADMIN — ONDANSETRON 4 MG: 2 INJECTION INTRAMUSCULAR; INTRAVENOUS at 05:01

## 2025-01-17 RX ADMIN — COLCHICINE 0.6 MG: 0.6 TABLET, FILM COATED ORAL at 10:01

## 2025-01-17 RX ADMIN — MORPHINE SULFATE 4 MG: 4 INJECTION, SOLUTION INTRAMUSCULAR; INTRAVENOUS at 12:01

## 2025-01-17 NOTE — ANESTHESIA POSTPROCEDURE EVALUATION
Anesthesia Post Evaluation    Patient: Jacoby Jean-Baptiste    Procedure(s) Performed: Procedure(s) (LRB):  COLONOSCOPY (N/A)    Final Anesthesia Type: general      Patient location during evaluation: GI PACU  Patient participation: Yes- Able to Participate  Level of consciousness: awake and alert  Post-procedure vital signs: reviewed and stable  Pain management: adequate  Airway patency: patent    PONV status at discharge: No PONV  Anesthetic complications: no      Cardiovascular status: hemodynamically stable  Respiratory status: unassisted, spontaneous ventilation and room air  Hydration status: euvolemic  Follow-up not needed.              Vitals Value Taken Time   BP 99/51 01/17/25 1500   Temp 36.4 °C (97.5 °F) 01/17/25 1502   Pulse 75 01/17/25 1502   Resp 14 01/17/25 1502   SpO2 99 % 01/17/25 1502   Vitals shown include unfiled device data.      No case tracking events are documented in the log.      Pain/Kusum Score: Pain Rating Prior to Med Admin: 9 (1/17/2025 11:55 AM)  Pain Rating Post Med Admin: 3 (1/17/2025 12:25 PM)

## 2025-01-17 NOTE — ASSESSMENT & PLAN NOTE
Maintain PO dificid  Other conservative measures  CT abd and pelvis with IV contrast revealed concentric wall thickening involving the left hemicolon surrounding pericolonic inflammatory changes compatible with change of acute colitis with greater inflammatory change from previous exam  ID consulted

## 2025-01-17 NOTE — PROGRESS NOTES
[facility] Medicine  Progress Note    Patient Name: Jacoby Jean-Baptiste  MRN: [id number]  Patient Class: IP- Inpatient   Admission Date: 1/15/2025  Length of Stay: 0 days  Attending Physician: Matteo Melendez DO  Primary Care Provider: Hunter Aguilar III, MD        Subjective     Principal Problem:C. difficile colitis        HPI:  50 year old pt getting admitted with C diff colitis and possible UC flare  Pt was in hospital recently with C diff colitis and went home with dificid  Pt is on steroids for UC  He did well after discharge  Later started having abdominal pain all over/crampy associated with nausea  This was followed by vomiting episodes and  weakness  He came back to ER and got admitted     Overview/Hospital Course:  The patient was admitted to the hospital medicine service and was monitored closely.  The patient was started on fidaxomicin and Flagyl.  The patient was also given IV steroids for possible UC flare.  Infectious Disease and GI were consulted.  The patient was started on Dificid for C difficile.  He was given IV pain medications for abdominal pain.  He was taken for a colonoscopy and sigmoidoscopy for biopsy and mapping for future possible colectomy with Dr. Monroy outpatient.      Interval History:  Patient evaluated at bedside.  He is NPO for sigmoidoscopy and colonoscopy.  Still with abdominal pain no chest pain shortness of breath fevers or chills.  Still with diarrhea as well.    Review of Systems   Constitutional:  Positive for activity change and appetite change. Negative for chills and fever.   Cardiovascular:  Negative for chest pain.   Gastrointestinal:  Positive for abdominal pain and diarrhea. Negative for nausea and vomiting.     Objective:     Vital Signs (Most Recent):  Temp: 98.1 °F (36.7 °C) (01/17/25 1119)  Pulse: 71 (01/17/25 1119)  Resp: 18 (01/17/25 1155)  BP: 136/84 (01/17/25 1119)  SpO2: 98 % (01/17/25 1119) Vital Signs (24h Range):  Temp:  [97.7 °F  (36.5 °C)-98.4 °F (36.9 °C)] 98.1 °F (36.7 °C)  Pulse:  [71-78] 71  Resp:  [16-18] 18  SpO2:  [94 %-98 %] 98 %  BP: (127-143)/(81-85) 136/84     Weight: 133 kg (293 lb 3.4 oz)  Body mass index is 48.79 kg/m².    Intake/Output Summary (Last 24 hours) at 1/17/2025 1413  Last data filed at 1/17/2025 0421  Gross per 24 hour   Intake 480 ml   Output 3 ml   Net 477 ml         Physical Exam  Constitutional:       Appearance: Normal appearance. He is obese. He is not toxic-appearing.   HENT:      Nose: Nose normal.      Mouth/Throat:      Mouth: Mucous membranes are dry.      Pharynx: Oropharynx is clear.   Eyes:      Extraocular Movements: Extraocular movements intact.      Conjunctiva/sclera: Conjunctivae normal.   Cardiovascular:      Rate and Rhythm: Normal rate and regular rhythm.      Pulses: Normal pulses.      Heart sounds: Normal heart sounds.   Pulmonary:      Effort: Pulmonary effort is normal.      Breath sounds: Normal breath sounds.   Abdominal:      General: Bowel sounds are normal. There is no distension.      Palpations: Abdomen is soft.      Tenderness: There is abdominal tenderness. There is no guarding.   Skin:     General: Skin is warm.      Capillary Refill: Capillary refill takes less than 2 seconds.   Neurological:      Mental Status: He is alert and oriented to person, place, and time.   Psychiatric:         Mood and Affect: Mood normal.             Significant Labs: All pertinent labs within the past 24 hours have been reviewed.  Recent Lab Results         01/17/25  0530        Albumin 3.5       ALP 65       ALT 6       Anion Gap 7       AST 11       Baso # 0.03       Basophil % 0.3       BILIRUBIN TOTAL 0.3  Comment: For infants and newborns, interpretation of results should be based  on gestational age, weight and in agreement with clinical  observations.    Premature Infant recommended reference ranges:  Up to 24 hours.............<8.0 mg/dL  Up to 48 hours............<12.0 mg/dL  3-5  days..................<15.0 mg/dL  6-29 days.................<15.0 mg/dL         BUN 9       Calcium 8.8       Chloride 102       CO2 26       Creatinine 1.1       Differential Method Automated       eGFR >60.0       Eos # 0.0       Eos % 0.3       Glucose 136       Gran # (ANC) 7.7       Gran % 88.0       Hematocrit 36.9       Hemoglobin 10.9       Immature Grans (Abs) 0.09  Comment: Mild elevation in immature granulocytes is non specific and   can be seen in a variety of conditions including stress response,   acute inflammation, trauma and pregnancy. Correlation with other   laboratory and clinical findings is essential.         Immature Granulocytes 1.0       Lymph # 0.7       Lymph % 7.9       Magnesium  1.7       MCH 26.2       MCHC 29.5       MCV 89       Mono # 0.2       Mono % 2.5       MPV 10.0       nRBC 0       Platelet Count 343       Potassium 4.5       PROTEIN TOTAL 7.0       RBC 4.16       RDW 17.6       Sodium 135       WBC 8.77               Significant Imaging: I have reviewed all pertinent imaging results/findings within the past 24 hours.    Assessment and Plan     * C. difficile colitis  Maintain PO dificid  Other conservative measures  CT abd and pelvis with IV contrast revealed concentric wall thickening involving the left hemicolon surrounding pericolonic inflammatory changes compatible with change of acute colitis with greater inflammatory change from previous exam  ID consulted      Recurrent Clostridioides difficile infection  Aware       History of DVT (deep vein thrombosis)  Maintain NOAC which pt is on at home      Long-term use of immunosuppressant medication  Aware       Chest pain  Resolved with GI cocktail  EKG with normal sinus rhythm no ischemic changes  Troponin negative, 2nd troponin pending  Chest x-ray without acute findings      PUD (peptic ulcer disease)  Pepcid  Wont give PPI because of C Diff       Exacerbation of ulcerative colitis without complication  Pt on budesonide  caps which will be changed to iv solumedrol at the moment   Iv fluids  Added iv flagyl  Wont add quinolone because it can worsen C diff colitis  Other conservative measures   Consult to GI  Colonoscopy and sigmoidoscopy today per GI  Plan on following up outpatient for colectomy with Dr. Church      Type 2 diabetes mellitus without complication, without long-term current use of insulin  Maintain present regime       Morbid obesity  Body mass index is 48.79 kg/m². Morbid obesity complicates all aspects of disease management from diagnostic modalities to treatment.      VTE Risk Mitigation (From admission, onward)           Ordered     rivaroxaban tablet 20 mg  With dinner         01/15/25 1744     IP VTE HIGH RISK PATIENT  Once         01/15/25 1744     Place sequential compression device  Until discontinued         01/15/25 1744                    Discharge Planning   GERARDO: 1/18/2025     Code Status: Full Code   Medical Readiness for Discharge Date:   Discharge Plan A: Home Health                        JAMIA Jordan  Department of Hospital Medicine   [facility]

## 2025-01-17 NOTE — PROGRESS NOTES
[facility]   Department of Infectious Disease  Progress Note        PATIENT NAME: Jacoby Jean-Baptiste  MRN: [id number]  TODAY'S DATE: 01/17/2025  ADMIT DATE: 1/15/2025  LOS: 0 days    CHIEF COMPLAINT: Nausea, Vomiting, and Diarrhea (States he has had N/V that began last night.  Diagnosed with C Diff 1/6/25 hx of UC)      PRINCIPLE PROBLEM: C. difficile colitis    INTERVAL HISTORY      01/17/2025:  Seen and evaluated at bedside.  CT abdomen and pelvis showed colitis.  Had colonoscopy today which showed cause and erosions consistent with ulcerative colitis with biopsies taken.  Antibiotics (From admission, onward)      Start     Stop Route Frequency Ordered    01/15/25 2100  fidaxomicin tablet 200 mg         -- Oral 2 times daily 01/15/25 1744          Antifungals (From admission, onward)      None           Antivirals (From admission, onward)      None            ASSESSMENT and PLAN      Recent diagnosis of C diff infection.  It appears current symptoms are more in keeping with UC flare.  Also, C diff toxin during his previous hospitalization was negative.  We will consider transition to C diff prophylaxis in the short term with vancomycin 125 mg b.i.d. for DC C diff therapy altogether and manage expectantly.     2. Abdominal pain with nausea and diarrhea.  Related to UC flare.  Management as per GI.    RECOMMENDATIONS:    Anticipate DC deficit tomorrow and switch to oral vancomycin 125 mg b.i.d.   Await management of UC by GI.    Please send Epic secure chat with any questions.      SUBJECTIVE    Jacoby Jean-Baptiste is a 50 y.o. male with history of diabetes mellitus, morbid obesity and hypertension.  Also with ulcerative colitis on different medications in the past started Tremfya 12/01/2024 and received a 2nd dose 12/30/2024.  Of note, CT abdomen and pelvis 12/31/2024 showed findings consistent with colitis.       Presented to the ED 01/04/2025 with diarrhea.  KUB read as normal gas pattern.  Stool C diff  antigen and PCR were positive and toxin assay was negative.  He was commenced on Dificid.  Improved and was discharged 01/09/2025 on Dificid with plan to complete a 20 day course followed by 1 tablet daily through ?02/21/2025.     Back in the ED 01/15/2025 with abdominal cramps associated with nausea and vomiting.  CBC and CMP unremarkable.  Chest x-ray with no acute infiltrate.  KUB showed large volume stool throughout the colon with nonspecific gaseous distention of multiple bowel loops.  Dificid was continued on metronidazole added.  ID asked to assist with his care.     He has a previous history of C diff infection in February 20, 2023.  No recent antibiotic intake.        Antibiotic history:    Dificid:  01/06/2025-  Metronidazole:  01/15/2025-     Microbiology:    Stool C diff PCR on antigen positive 1/4/25   Stool C diff toxin 01/04/2025: Negative  Blood culture 01/15/2025: In progress     Review of Systems  Negative except as stated above in Interval History     OBJECTIVE   Temp:  [97.7 °F (36.5 °C)-98.4 °F (36.9 °C)] 98.1 °F (36.7 °C)  Pulse:  [71-78] 71  Resp:  [16-18] 18  SpO2:  [94 %-98 %] 98 %  BP: (127-143)/(81-85) 136/84  Temp:  [97.7 °F (36.5 °C)-98.4 °F (36.9 °C)]   Temp: 98.1 °F (36.7 °C) (01/17/25 1119)  Pulse: 71 (01/17/25 1119)  Resp: 18 (01/17/25 1155)  BP: 136/84 (01/17/25 1119)  SpO2: 98 % (01/17/25 1119)    Intake/Output Summary (Last 24 hours) at 1/17/2025 1157  Last data filed at 1/17/2025 0421  Gross per 24 hour   Intake 480 ml   Output 3 ml   Net 477 ml       Physical Exam  General:  Morbidly obese middle-aged man lying quietly in bed in no acute distress   CVS: S1 and 2 heard, no murmurs appreciated   Respiratory: Clear to auscultation   Abdomen: Full, soft, nontender, no palpable organomegaly   Skin: No rash appreciated   CNS: No focal deficits   Musculoskeletal: No joint or bony abnormalities appreciated  Psych: Good mood, normal affect.      VAD:  PIV  ISOLATION:  Isolation for C  "diff infection     Wounds:  None    Significant Labs: All pertinent labs within the past 24 hours have been reviewed.    CBC LAST 7 DAYS  Recent Labs   Lab 01/15/25  1608 01/16/25  0505 01/17/25  0530   WBC 11.82 7.71 8.77   RBC 4.65 4.11* 4.16*   HGB 12.3* 10.7* 10.9*   HCT 40.3 35.7* 36.9*   MCV 87 87 89   MCH 26.5* 26.0* 26.2*   MCHC 30.5* 30.0* 29.5*   RDW 17.4* 17.3* 17.6*    358 343   MPV 9.8 10.2 10.0   GRAN 70.8  8.4* 89.5*  6.9 88.0*  7.7   LYMPH 14.1*  1.7 7.7*  0.6* 7.9*  0.7*   MONO 8.0  1.0 1.3*  0.1* 2.5*  0.2*   BASO 0.11 0.02 0.03   NRBC 0 0 0       CHEMISTRY LAST 7 DAYS  Recent Labs   Lab 01/15/25  1608 01/16/25  0505 01/17/25  0530   * 135* 135*   K 4.5 5.1 4.5    105 102   CO2 26 25 26   ANIONGAP 6* 5* 7*   BUN 10 12 9   CREATININE 1.1 1.1 1.1   GLU 90 148* 136*   CALCIUM 9.0 8.6* 8.8   MG  --  1.7 1.7   ALBUMIN 3.7 3.3* 3.5   PROT 7.6 6.6 7.0   ALKPHOS 75 67 65   ALT 8* 6* 6*   AST 10 8* 11   BILITOT 0.3 0.3 0.3       Estimated Creatinine Clearance: 101.2 mL/min (based on SCr of 1.1 mg/dL).    INFLAMMATORY/PROCAL  LAST 7 DAYS  No results for input(s): "PROCAL", "ESR", "CRP" in the last 168 hours.  No results found for: "ESR"  CRP   Date Value Ref Range Status   12/31/2024 2.80 (H) <1.00 mg/dL Final     Comment:     CRP-Normal Application expected values:   <1.0        mg/dL   Normal Range  1.0 - 5.0  mg/dL   Indicates mild inflammation  5.0 - 10.0 mg/dL   Indicates severe inflammation  >10.0        mg/dL   Represents serious processes and   frequently         indicates the presence of a bacterial   infection.      12/22/2024 2.10 (H) <1.00 mg/dL Final     Comment:     CRP-Normal Application expected values:   <1.0        mg/dL   Normal Range  1.0 - 5.0  mg/dL   Indicates mild inflammation  5.0 - 10.0 mg/dL   Indicates severe inflammation  >10.0        mg/dL   Represents serious processes and   frequently         indicates the presence of a bacterial   infection.    "   12/06/2024 1.90 (H) <1.00 mg/dL Final     Comment:     CRP-Normal Application expected values:   <1.0        mg/dL   Normal Range  1.0 - 5.0  mg/dL   Indicates mild inflammation  5.0 - 10.0 mg/dL   Indicates severe inflammation  >10.0        mg/dL   Represents serious processes and   frequently         indicates the presence of a bacterial   infection.      11/01/2024 2.90 (H) <1.00 mg/dL Final     Comment:     CRP-Normal Application expected values:   <1.0        mg/dL   Normal Range  1.0 - 5.0  mg/dL   Indicates mild inflammation  5.0 - 10.0 mg/dL   Indicates severe inflammation  >10.0        mg/dL   Represents serious processes and   frequently         indicates the presence of a bacterial   infection.      08/11/2024 4.20 (H) <1.00 mg/dL Final     Comment:     CRP-Normal Application expected values:   <1.0        mg/dL   Normal Range  1.0 - 5.0  mg/dL   Indicates mild inflammation  5.0 - 10.0 mg/dL   Indicates severe inflammation  >10.0        mg/dL   Represents serious processes and   frequently         indicates the presence of a bacterial   infection.      07/15/2024 1.80 (H) <1.00 mg/dL Final     Comment:     CRP-Normal Application expected values:   <1.0        mg/dL   Normal Range  1.0 - 5.0  mg/dL   Indicates mild inflammation  5.0 - 10.0 mg/dL   Indicates severe inflammation  >10.0        mg/dL   Represents serious processes and   frequently         indicates the presence of a bacterial   infection.      07/12/2024 1.30 (H) <1.00 mg/dL Final     Comment:     CRP-Normal Application expected values:   <1.0        mg/dL   Normal Range  1.0 - 5.0  mg/dL   Indicates mild inflammation  5.0 - 10.0 mg/dL   Indicates severe inflammation  >10.0        mg/dL   Represents serious processes and   frequently         indicates the presence of a bacterial   infection.          PRIOR  MICROBIOLOGY:    No results found for the last 90 days.      LAST 7 DAYS MICROBIOLOGY   Microbiology Results (last 7 days)        Procedure Component Value Units Date/Time    Stool culture [[id number]] Collected: 01/16/25 1045    Order Status: Completed Specimen: Stool Updated: 01/17/25 1057     Stool Culture Nothing significant to date    Blood culture [[id number]] Collected: 01/16/25 0505    Order Status: Completed Specimen: Blood Updated: 01/17/25 0832     Blood Culture, Routine No Growth to date      No Growth to date              CURRENT/PREVIOUS VISIT EKG  Results for orders placed or performed during the hospital encounter of 01/15/25   EKG 12-lead    Collection Time: 01/16/25  9:40 AM   Result Value Ref Range    QRS Duration 76 ms    OHS QTC Calculation 443 ms    Narrative    Test Reason : R07.9,    Vent. Rate :  76 BPM     Atrial Rate :  76 BPM     P-R Int : 156 ms          QRS Dur :  76 ms      QT Int : 394 ms       P-R-T Axes :  42  23  36 degrees    QTcB Int : 443 ms    Normal sinus rhythm  Normal ECG  No previous ECGs available    Referred By: AAAREFERRAL SELF           Confirmed By:       Significant Imaging: I have reviewed all relevant and available imaging results/findings within the past 24 hours.    I spent a total of 50 minutes on the day of the visit.This includes face to face time and non-face to face time preparing to see the patient (eg, review of tests), obtaining and/or reviewing separately obtained history, documenting clinical information in the electronic or other health record, independently interpreting results and communicating results to the patient/family/caregiver, or care coordinator.    Toan Dickinson MD  Date of Service: 01/17/2025      This note was created using Leeo voice recognition software that occasionally misinterpreted phrases or words.

## 2025-01-17 NOTE — ASSESSMENT & PLAN NOTE
Pt on budesonide caps which will be changed to iv solumedrol at the moment   Iv fluids  Added iv flagyl  Wont add quinolone because it can worsen C diff colitis  Other conservative measures   Consult to GI  Colonoscopy and sigmoidoscopy today per GI  Plan on following up outpatient for colectomy with Dr. Church

## 2025-01-17 NOTE — SUBJECTIVE & OBJECTIVE
Interval History:  Patient evaluated at bedside.  He is NPO for sigmoidoscopy and colonoscopy.  Still with abdominal pain no chest pain shortness of breath fevers or chills.  Still with diarrhea as well.    Review of Systems   Constitutional:  Positive for activity change and appetite change. Negative for chills and fever.   Cardiovascular:  Negative for chest pain.   Gastrointestinal:  Positive for abdominal pain and diarrhea. Negative for nausea and vomiting.     Objective:     Vital Signs (Most Recent):  Temp: 98.1 °F (36.7 °C) (01/17/25 1119)  Pulse: 71 (01/17/25 1119)  Resp: 18 (01/17/25 1155)  BP: 136/84 (01/17/25 1119)  SpO2: 98 % (01/17/25 1119) Vital Signs (24h Range):  Temp:  [97.7 °F (36.5 °C)-98.4 °F (36.9 °C)] 98.1 °F (36.7 °C)  Pulse:  [71-78] 71  Resp:  [16-18] 18  SpO2:  [94 %-98 %] 98 %  BP: (127-143)/(81-85) 136/84     Weight: 133 kg (293 lb 3.4 oz)  Body mass index is 48.79 kg/m².    Intake/Output Summary (Last 24 hours) at 1/17/2025 1413  Last data filed at 1/17/2025 0421  Gross per 24 hour   Intake 480 ml   Output 3 ml   Net 477 ml         Physical Exam  Constitutional:       Appearance: Normal appearance. He is obese. He is not toxic-appearing.   HENT:      Nose: Nose normal.      Mouth/Throat:      Mouth: Mucous membranes are dry.      Pharynx: Oropharynx is clear.   Eyes:      Extraocular Movements: Extraocular movements intact.      Conjunctiva/sclera: Conjunctivae normal.   Cardiovascular:      Rate and Rhythm: Normal rate and regular rhythm.      Pulses: Normal pulses.      Heart sounds: Normal heart sounds.   Pulmonary:      Effort: Pulmonary effort is normal.      Breath sounds: Normal breath sounds.   Abdominal:      General: Bowel sounds are normal. There is no distension.      Palpations: Abdomen is soft.      Tenderness: There is abdominal tenderness. There is no guarding.   Skin:     General: Skin is warm.      Capillary Refill: Capillary refill takes less than 2 seconds.    Neurological:      Mental Status: He is alert and oriented to person, place, and time.   Psychiatric:         Mood and Affect: Mood normal.             Significant Labs: All pertinent labs within the past 24 hours have been reviewed.  Recent Lab Results         01/17/25  0530        Albumin 3.5       ALP 65       ALT 6       Anion Gap 7       AST 11       Baso # 0.03       Basophil % 0.3       BILIRUBIN TOTAL 0.3  Comment: For infants and newborns, interpretation of results should be based  on gestational age, weight and in agreement with clinical  observations.    Premature Infant recommended reference ranges:  Up to 24 hours.............<8.0 mg/dL  Up to 48 hours............<12.0 mg/dL  3-5 days..................<15.0 mg/dL  6-29 days.................<15.0 mg/dL         BUN 9       Calcium 8.8       Chloride 102       CO2 26       Creatinine 1.1       Differential Method Automated       eGFR >60.0       Eos # 0.0       Eos % 0.3       Glucose 136       Gran # (ANC) 7.7       Gran % 88.0       Hematocrit 36.9       Hemoglobin 10.9       Immature Grans (Abs) 0.09  Comment: Mild elevation in immature granulocytes is non specific and   can be seen in a variety of conditions including stress response,   acute inflammation, trauma and pregnancy. Correlation with other   laboratory and clinical findings is essential.         Immature Granulocytes 1.0       Lymph # 0.7       Lymph % 7.9       Magnesium  1.7       MCH 26.2       MCHC 29.5       MCV 89       Mono # 0.2       Mono % 2.5       MPV 10.0       nRBC 0       Platelet Count 343       Potassium 4.5       PROTEIN TOTAL 7.0       RBC 4.16       RDW 17.6       Sodium 135       WBC 8.77               Significant Imaging: I have reviewed all pertinent imaging results/findings within the past 24 hours.

## 2025-01-17 NOTE — ANESTHESIA PREPROCEDURE EVALUATION
01/17/2025  Jacoby Jean-Baptiste is a 50 y.o., male.    Results for orders placed or performed during the hospital encounter of 01/15/25   EKG 12-lead    Collection Time: 01/16/25  9:40 AM   Result Value Ref Range    QRS Duration 76 ms    OHS QTC Calculation 443 ms    Narrative    Test Reason : R07.9,    Vent. Rate :  76 BPM     Atrial Rate :  76 BPM     P-R Int : 156 ms          QRS Dur :  76 ms      QT Int : 394 ms       P-R-T Axes :  42  23  36 degrees    QTcB Int : 443 ms    Normal sinus rhythm  Normal ECG  No previous ECGs available    Referred By: AAAREFERRAL SELF           Confirmed By:         Imaging Results              X-Ray KUB (Final result)  Result time 01/16/25 03:05:00      Final result by Mirta Gayle MD (01/16/25 03:05:00)                   Impression:      As above      Electronically signed by: Mirta Gayle  Date:    01/16/2025  Time:    03:05               Narrative:    EXAMINATION:  XR KUB    CLINICAL HISTORY:  colitis;    TECHNIQUE:  Single AP supine view of the abdomen (KUB) was performed    COMPARISON:  01/07/2025    FINDINGS:  Large volume stool throughout the colon.  Nonspecific gaseous distension of multiple bowel loops.                                       Lab Results   Component Value Date    WBC 8.77 01/17/2025    HGB 10.9 (L) 01/17/2025    HCT 36.9 (L) 01/17/2025    MCV 89 01/17/2025     01/17/2025     BMP  Lab Results   Component Value Date     (L) 01/17/2025    K 4.5 01/17/2025     01/17/2025    CO2 26 01/17/2025    BUN 9 01/17/2025    CREATININE 1.1 01/17/2025    CALCIUM 8.8 01/17/2025    ANIONGAP 7 (L) 01/17/2025     (H) 01/17/2025     (H) 01/16/2025    GLU 90 01/15/2025       Results for orders placed during the hospital encounter of 11/17/23    Echo    Interpretation Summary    Left Ventricle: The left ventricle is normal in  size. Normal wall thickness. There is concentric remodeling. Normal wall motion. There is normal systolic function with a visually estimated ejection fraction of 55 - 60%. There is normal diastolic function.    Right Ventricle: Normal right ventricular cavity size. Wall thickness is normal. Right ventricle wall motion  is normal. Systolic function is normal.    IVC/SVC: Normal venous pressure at 3 mmHg.          Pre-op Assessment    I have reviewed the Patient Summary Reports.     I have reviewed the Nursing Notes. I have reviewed the NPO Status.   I have reviewed the Medications.     Review of Systems  Anesthesia Hx:  No problems with previous Anesthesia             Denies Family Hx of Anesthesia complications.    Denies Personal Hx of Anesthesia complications.                    Social:  Former Smoker, Alcohol Use       Hematology/Oncology:    Oncology Normal    -- Anemia:                                  EENT/Dental:   Very poor dentition.  Most teeth broken or missing.          Cardiovascular:     Hypertension, poorly controlled    Dysrhythmias       hyperlipidemia   ECG has been reviewed. History of supraventricular tachycardia  S/P radiofrequency ablation operation for arrhythmia                           Renal/:  Chronic Renal Disease renal calculi       Kidney Function/Disease             Hepatic/GI:   PUD,   Liver Disease,  History of C. difficile colitis  Recent nausea and vomiting.  Last emesis a few days ago.  Last food and drink yesterday.        Bowel Conditions:  Inflammatory Bowel Disease, Ulcerative Colitis     Liver Disease        Musculoskeletal:  Musculoskeletal Normal                Neurological:    Neuromuscular Disease,       Left-sided weakness                            Endocrine:  Diabetes, well controlled, type 2         Morbid Obesity / BMI > 40  Psych:  Psychiatric History anxiety depression Sleep Disorder and Insomnia.       Sleep Disorder and Insomnia.        Physical Exam  General:  Well nourished, Cooperative, Alert and Oriented    Airway:  Mallampati: III   Mouth Opening: Normal  TM Distance: > 6 cm  Tongue: Normal  Neck ROM: Normal ROM    Dental:  Periodontal disease  Multiple chipped and broken   Patient denies loose teeth  Chest/Lungs:  Clear to auscultation, Normal Respiratory Rate    Heart:  Rate: Normal  Rhythm: Regular Rhythm  Sounds: Normal        Anesthesia Plan  Type of Anesthesia, risks & benefits discussed:    Anesthesia Type: Gen Natural Airway  Intra-op Monitoring Plan: Standard ASA Monitors  Post Op Pain Control Plan:   (medical reason for not using multimodal pain management)  Induction:  IV  Informed Consent: Informed consent signed with the Patient and all parties understand the risks and agree with anesthesia plan.  All questions answered.   ASA Score: 3  Anesthesia Plan Notes:       GNA  POM  Propofol     Ready For Surgery From Anesthesia Perspective.     .

## 2025-01-17 NOTE — TRANSFER OF CARE
"Anesthesia Transfer of Care Note    Patient: Jacoby Jean-Baptiste    Procedure(s) Performed: Procedure(s) (LRB):  COLONOSCOPY (N/A)    Patient location: GI    Anesthesia Type: general    Transport from OR: Transported from OR on room air with adequate spontaneous ventilation    Post pain: adequate analgesia    Post assessment: no apparent anesthetic complications and tolerated procedure well    Post vital signs: stable    Level of consciousness: awake, alert and oriented    Nausea/Vomiting: no nausea/vomiting    Complications: none    Transfer of care protocol was followed      Last vitals: Visit Vitals  /84   Pulse 71   Temp 36.7 °C (98.1 °F) (Oral)   Resp 18   Ht 5' 5" (1.651 m)   Wt 133 kg (293 lb 3.4 oz)   SpO2 98%   BMI 48.79 kg/m²     "

## 2025-01-17 NOTE — PLAN OF CARE
Problem: Adult Inpatient Plan of Care  Goal: Plan of Care Review  Outcome: Progressing  Goal: Patient-Specific Goal (Individualized)  Outcome: Progressing  Goal: Absence of Hospital-Acquired Illness or Injury  Outcome: Progressing  Goal: Optimal Comfort and Wellbeing  Outcome: Progressing  Goal: Readiness for Transition of Care  Outcome: Progressing     Problem: Bariatric Environmental Safety  Goal: Safety Maintained with Care  Outcome: Progressing     Problem: Diabetes Comorbidity  Goal: Blood Glucose Level Within Targeted Range  Outcome: Progressing     Problem: Sepsis/Septic Shock  Goal: Optimal Coping  Outcome: Progressing  Goal: Absence of Bleeding  Outcome: Progressing  Goal: Blood Glucose Level Within Targeted Range  Outcome: Progressing  Goal: Absence of Infection Signs and Symptoms  Outcome: Progressing  Goal: Optimal Nutrition Intake  Outcome: Progressing     Problem: Acute Kidney Injury/Impairment  Goal: Fluid and Electrolyte Balance  Outcome: Progressing  Goal: Improved Oral Intake  Outcome: Progressing  Goal: Effective Renal Function  Outcome: Progressing     Problem: Wound  Goal: Optimal Coping  Outcome: Progressing  Goal: Optimal Functional Ability  Outcome: Progressing  Goal: Absence of Infection Signs and Symptoms  Outcome: Progressing  Goal: Improved Oral Intake  Outcome: Progressing  Goal: Optimal Pain Control and Function  Outcome: Progressing  Goal: Skin Health and Integrity  Outcome: Progressing  Goal: Optimal Wound Healing  Outcome: Progressing     Problem: Infection  Goal: Absence of Infection Signs and Symptoms  Outcome: Progressing

## 2025-01-17 NOTE — PROVATION PATIENT INSTRUCTIONS
Discharge Summary/Instructions after an Endoscopic Procedure  Patient Name: Jacoby Jean-Baptiste  Patient MRN: [id number]  Patient YOB: 1975 Friday, January 17, 2025  Russ Rwoe MD  RESTRICTIONS:  During your procedure today, you received medications for sedation.  These   medications may affect your judgment, balance and coordination.  Therefore,   for 24 hours, you have the following restrictions:   - DO NOT drive a car, operate machinery, make legal/financial decisions,   sign important papers or drink alcohol.    ACTIVITY:  Today: no heavy lifting, straining or running due to procedural   sedation/anesthesia.  The following day: return to full activity including work.  DIET:  Eat and drink normally unless instructed otherwise.     TREATMENT FOR COMMON SIDE EFFECTS:  - Mild abdominal pain, nausea, belching, bloating or excessive gas:  rest,   eat lightly and use a heating pad.  - Sore Throat: treat with throat lozenges and/or gargle with warm salt   water.  - Because air was used during the procedure, expelling large amounts of air   from your rectum or belching is normal.  - If a bowel prep was taken, you may not have a bowel movement for 1-3 days.    This is normal.  SYMPTOMS TO WATCH FOR AND REPORT TO YOUR PHYSICIAN:  1. Abdominal pain or bloating, other than gas cramps.  2. Chest pain.  3. Back pain.  4. Signs of infection such as: chills or fever occurring within 24 hours   after the procedure.  5. Rectal bleeding, which would show as bright red, maroon, or black stools.   (A tablespoon of blood from the rectum is not serious, especially if   hemorrhoids are present.)  6. Vomiting.  7. Weakness or dizziness.  GO DIRECTLY TO THE NEAREST EMERGENCY ROOM IF YOU HAVE ANY OF THE FOLLOWING:      Difficulty breathing              Chills and/or fever over 101 F   Persistent vomiting and/or vomiting blood   Severe abdominal pain   Severe chest pain   Black, tarry stools   Bleeding- more than one  tablespoon   Any other symptom or condition that you feel may need urgent attention  Your doctor recommends these additional instructions:  If any biopsies were taken, your doctors clinic will contact you in 1 to 2   weeks with any results.  - Return patient to hospital [facility] for ongoing care.   - Continue present medications.   - Await pathology results.   - Keep surgical appointment.  For questions, problems or results please call your physician - Russ Rowe MD at Work:  [phone].  [facility], EMERGENCY ROOM PHONE NUMBER: [phone]  IF A COMPLICATION OR EMERGENCY SITUATION ARISES AND YOU ARE UNABLE TO REACH   YOUR PHYSICIAN - GO DIRECTLY TO THE EMERGENCY ROOM.  Russ Rowe MD  1/17/2025 3:11:45 PM  This report has been verified and signed electronically.  Dear patient,  As a result of recent federal legislation (The Federal Cures Act), you may   receive lab or pathology results from your procedure in your [facility]   account before your physician is able to contact you. Your physician or   their representative will relay the results to you with their   recommendations at their soonest availability.  Thank you,  [employer]ATION

## 2025-01-18 LAB
ALBUMIN SERPL BCP-MCNC: 3.1 G/DL (ref 3.5–5.2)
ALP SERPL-CCNC: 54 U/L (ref 55–135)
ALT SERPL W/O P-5'-P-CCNC: 9 U/L (ref 10–44)
ANION GAP SERPL CALC-SCNC: 6 MMOL/L (ref 8–16)
AST SERPL-CCNC: 19 U/L (ref 10–40)
BACTERIA STL CULT: NORMAL
BACTERIA STL CULT: NORMAL
BASOPHILS # BLD AUTO: 0.01 K/UL (ref 0–0.2)
BASOPHILS NFR BLD: 0.1 % (ref 0–1.9)
BILIRUB SERPL-MCNC: 0.2 MG/DL (ref 0.1–1)
BUN SERPL-MCNC: 9 MG/DL (ref 6–20)
CALCIUM SERPL-MCNC: 8.5 MG/DL (ref 8.7–10.5)
CHLORIDE SERPL-SCNC: 102 MMOL/L (ref 95–110)
CO2 SERPL-SCNC: 27 MMOL/L (ref 23–29)
CREAT SERPL-MCNC: 1 MG/DL (ref 0.5–1.4)
DIFFERENTIAL METHOD BLD: ABNORMAL
EOSINOPHIL # BLD AUTO: 0 K/UL (ref 0–0.5)
EOSINOPHIL NFR BLD: 0 % (ref 0–8)
ERYTHROCYTE [DISTWIDTH] IN BLOOD BY AUTOMATED COUNT: 17.5 % (ref 11.5–14.5)
EST. GFR  (NO RACE VARIABLE): >60 ML/MIN/1.73 M^2
GLUCOSE SERPL-MCNC: 174 MG/DL (ref 70–110)
HCT VFR BLD AUTO: 33.7 % (ref 40–54)
HGB BLD-MCNC: 10.3 G/DL (ref 14–18)
IMM GRANULOCYTES # BLD AUTO: 0.1 K/UL (ref 0–0.04)
IMM GRANULOCYTES NFR BLD AUTO: 1.2 % (ref 0–0.5)
LYMPHOCYTES # BLD AUTO: 0.5 K/UL (ref 1–4.8)
LYMPHOCYTES NFR BLD: 6.7 % (ref 18–48)
MAGNESIUM SERPL-MCNC: 1.6 MG/DL (ref 1.6–2.6)
MCH RBC QN AUTO: 26.1 PG (ref 27–31)
MCHC RBC AUTO-ENTMCNC: 30.6 G/DL (ref 32–36)
MCV RBC AUTO: 86 FL (ref 82–98)
MONOCYTES # BLD AUTO: 0.2 K/UL (ref 0.3–1)
MONOCYTES NFR BLD: 1.9 % (ref 4–15)
NEUTROPHILS # BLD AUTO: 7.3 K/UL (ref 1.8–7.7)
NEUTROPHILS NFR BLD: 90.1 % (ref 38–73)
NRBC BLD-RTO: 0 /100 WBC
PLATELET # BLD AUTO: 300 K/UL (ref 150–450)
PMV BLD AUTO: 9.9 FL (ref 9.2–12.9)
POTASSIUM SERPL-SCNC: 4.6 MMOL/L (ref 3.5–5.1)
PROT SERPL-MCNC: 6.2 G/DL (ref 6–8.4)
RBC # BLD AUTO: 3.94 M/UL (ref 4.6–6.2)
SODIUM SERPL-SCNC: 135 MMOL/L (ref 136–145)
WBC # BLD AUTO: 8.07 K/UL (ref 3.9–12.7)

## 2025-01-18 PROCEDURE — 25000003 PHARM REV CODE 250: Performed by: INTERNAL MEDICINE

## 2025-01-18 PROCEDURE — 80053 COMPREHEN METABOLIC PANEL: CPT | Performed by: INTERNAL MEDICINE

## 2025-01-18 PROCEDURE — 27000207 HC ISOLATION

## 2025-01-18 PROCEDURE — 63600175 PHARM REV CODE 636 W HCPCS: Performed by: NURSE PRACTITIONER

## 2025-01-18 PROCEDURE — 99233 SBSQ HOSP IP/OBS HIGH 50: CPT | Mod: ,,, | Performed by: INTERNAL MEDICINE

## 2025-01-18 PROCEDURE — 85025 COMPLETE CBC W/AUTO DIFF WBC: CPT | Performed by: INTERNAL MEDICINE

## 2025-01-18 PROCEDURE — 12000002 HC ACUTE/MED SURGE SEMI-PRIVATE ROOM

## 2025-01-18 PROCEDURE — 63600175 PHARM REV CODE 636 W HCPCS: Mod: JZ,TB | Performed by: INTERNAL MEDICINE

## 2025-01-18 PROCEDURE — 83735 ASSAY OF MAGNESIUM: CPT | Performed by: INTERNAL MEDICINE

## 2025-01-18 PROCEDURE — 25000003 PHARM REV CODE 250: Performed by: NURSE PRACTITIONER

## 2025-01-18 PROCEDURE — 36415 COLL VENOUS BLD VENIPUNCTURE: CPT | Performed by: INTERNAL MEDICINE

## 2025-01-18 RX ORDER — MORPHINE SULFATE 4 MG/ML
4 INJECTION, SOLUTION INTRAMUSCULAR; INTRAVENOUS EVERY 6 HOURS PRN
Status: DISCONTINUED | OUTPATIENT
Start: 2025-01-18 | End: 2025-01-19 | Stop reason: HOSPADM

## 2025-01-18 RX ORDER — HYDROCODONE BITARTRATE AND ACETAMINOPHEN 10; 325 MG/1; MG/1
1 TABLET ORAL EVERY 4 HOURS PRN
Status: DISCONTINUED | OUTPATIENT
Start: 2025-01-18 | End: 2025-01-19 | Stop reason: HOSPADM

## 2025-01-18 RX ADMIN — BUSPIRONE HYDROCHLORIDE 15 MG: 5 TABLET ORAL at 08:01

## 2025-01-18 RX ADMIN — BUSPIRONE HYDROCHLORIDE 15 MG: 5 TABLET ORAL at 09:01

## 2025-01-18 RX ADMIN — RIVAROXABAN 20 MG: 20 TABLET, FILM COATED ORAL at 04:01

## 2025-01-18 RX ADMIN — FIDAXOMICIN 200 MG: 200 TABLET, FILM COATED ORAL at 09:01

## 2025-01-18 RX ADMIN — SODIUM CHLORIDE: 9 INJECTION, SOLUTION INTRAVENOUS at 06:01

## 2025-01-18 RX ADMIN — COLCHICINE 0.6 MG: 0.6 TABLET, FILM COATED ORAL at 09:01

## 2025-01-18 RX ADMIN — METHYLPREDNISOLONE SODIUM SUCCINATE 60 MG: 40 INJECTION, POWDER, FOR SOLUTION INTRAMUSCULAR; INTRAVENOUS at 09:01

## 2025-01-18 RX ADMIN — ONDANSETRON 4 MG: 2 INJECTION INTRAMUSCULAR; INTRAVENOUS at 07:01

## 2025-01-18 RX ADMIN — ESCITALOPRAM OXALATE 20 MG: 10 TABLET ORAL at 08:01

## 2025-01-18 RX ADMIN — FIDAXOMICIN 200 MG: 200 TABLET, FILM COATED ORAL at 08:01

## 2025-01-18 RX ADMIN — MORPHINE SULFATE 4 MG: 4 INJECTION, SOLUTION INTRAMUSCULAR; INTRAVENOUS at 07:01

## 2025-01-18 RX ADMIN — ONDANSETRON 4 MG: 2 INJECTION INTRAMUSCULAR; INTRAVENOUS at 12:01

## 2025-01-18 RX ADMIN — METOPROLOL SUCCINATE 50 MG: 50 TABLET, FILM COATED, EXTENDED RELEASE ORAL at 08:01

## 2025-01-18 RX ADMIN — HYDROCODONE BITARTRATE AND ACETAMINOPHEN 1 TABLET: 10; 325 TABLET ORAL at 11:01

## 2025-01-18 RX ADMIN — BUSPIRONE HYDROCHLORIDE 15 MG: 5 TABLET ORAL at 02:01

## 2025-01-18 RX ADMIN — MORPHINE SULFATE 4 MG: 4 INJECTION, SOLUTION INTRAMUSCULAR; INTRAVENOUS at 08:01

## 2025-01-18 RX ADMIN — ZOLPIDEM TARTRATE 10 MG: 5 TABLET, FILM COATED ORAL at 09:01

## 2025-01-18 RX ADMIN — FAMOTIDINE 20 MG: 20 TABLET, FILM COATED ORAL at 09:01

## 2025-01-18 RX ADMIN — MORPHINE SULFATE 4 MG: 4 INJECTION, SOLUTION INTRAMUSCULAR; INTRAVENOUS at 04:01

## 2025-01-18 RX ADMIN — FAMOTIDINE 20 MG: 20 TABLET, FILM COATED ORAL at 08:01

## 2025-01-18 RX ADMIN — MORPHINE SULFATE 4 MG: 4 INJECTION, SOLUTION INTRAMUSCULAR; INTRAVENOUS at 12:01

## 2025-01-18 RX ADMIN — COLCHICINE 0.6 MG: 0.6 TABLET, FILM COATED ORAL at 08:01

## 2025-01-18 RX ADMIN — ONDANSETRON 4 MG: 2 INJECTION INTRAMUSCULAR; INTRAVENOUS at 04:01

## 2025-01-18 RX ADMIN — HYDROCODONE BITARTRATE AND ACETAMINOPHEN 1 TABLET: 10; 325 TABLET ORAL at 04:01

## 2025-01-18 NOTE — PROGRESS NOTES
[facility]   Department of Infectious Disease  Progress Note        PATIENT NAME: Jacoby Jean-Baptiste  MRN: [id number]  TODAY'S DATE: 01/18/2025  ADMIT DATE: 1/15/2025  LOS: 1 days    CHIEF COMPLAINT: Nausea, Vomiting, and Diarrhea (States he has had N/V that began last night.  Diagnosed with C Diff 1/6/25 hx of UC)      PRINCIPLE PROBLEM: C. difficile colitis    INTERVAL HISTORY      01/17/2025:  Seen and evaluated at bedside.  CT abdomen and pelvis showed colitis.  Had colonoscopy today which showed cause and erosions consistent with ulcerative colitis with biopsies taken.    1/18/25:  History from patient today.  He has been on multiple medications for ulcerative colitis.  The only medication and worked for him was Remicade.  Unfortunately he had complications including pneumoniae from Remicade.  Currently on Tremfya and he has not seen that much benefit clinically from it.  When he had C diff 2 years ago it was prolonged and recurrent and difficult to resolve ultimately [place].  He has had to stop work due to challenges related to control of his UC and is currently in the process of applying for disability.  He has also made the difficult decision to be evaluated by general surgeon on 02/04/2025 to undergo colectomy.  Decision made at his last hospitalization under the care of my partner Dr. Chaves to stay on prolonged fidaxomicin and also to receive Bezlotoxumab.    Antibiotics (From admission, onward)      Start     Stop Route Frequency Ordered    01/18/25 0900  vancomycin 125 mg/5 mL oral solution 125 mg         -- Oral 2 times daily 01/18/25 0843          Antifungals (From admission, onward)      None           Antivirals (From admission, onward)      None            ASSESSMENT and PLAN      Recent diagnosis of C diff infection.  It appears current symptoms are more in keeping with UC flare.  Also, C diff toxin during his previous hospitalization was negative.  My thought process at this time time  was to switch him to secondary prophylactic oral vancomycin pending improvement of UC or total colectomy for management of UC.  However, since decision already made to stay on fidaxomicin and he has a dog at home we will continue that pending follow up with Anastacio in the office and also with general surgeon to evaluate for UC.  Can also receive the planned Bezkotoxumab post discharge.    2. Abdominal pain with nausea and diarrhea.  Related to UC flare.  Management as per GI.    RECOMMENDATIONS:    Continue fidaxomicin   Follow up with Dr. Chaves on 01/21/2025   He has a appointment with General surgery on 02/04/2025 to discuss total colectomy   Continue other management of UC as per GI    Thank you for involving ID in the care of this patient.  ID service will drop off today.  Please send Epic secure chat with any questions.      SUBJECTIVE    Jacoby Jean-Baptiste is a 50 y.o. male with history of diabetes mellitus, morbid obesity and hypertension.  Also with ulcerative colitis on different medications in the past started Tremfya 12/01/2024 and received a 2nd dose 12/30/2024.  Of note, CT abdomen and pelvis 12/31/2024 showed findings consistent with colitis.       Presented to the ED 01/04/2025 with diarrhea.  KUB read as normal gas pattern.  Stool C diff antigen and PCR were positive and toxin assay was negative.  He was commenced on Dificid.  Improved and was discharged 01/09/2025 on Dificid with plan to complete a 20 day course followed by 1 tablet daily through ?02/21/2025.     Back in the ED 01/15/2025 with abdominal cramps associated with nausea and vomiting.  CBC and CMP unremarkable.  Chest x-ray with no acute infiltrate.  KUB showed large volume stool throughout the colon with nonspecific gaseous distention of multiple bowel loops.  Dificid was continued on metronidazole added.  ID asked to assist with his care.     He has a previous history of C diff infection in February 20, 2023.  No recent antibiotic  intake.        Antibiotic history:    Dificid:  01/06/2025-  Metronidazole:  01/15/2025-     Microbiology:    Stool C diff PCR on antigen positive 1/4/25   Stool C diff toxin 01/04/2025: Negative  Blood culture 01/15/2025: In progress     Review of Systems  Negative except as stated above in Interval History     OBJECTIVE   Temp:  [97.5 °F (36.4 °C)-98.5 °F (36.9 °C)] 98 °F (36.7 °C)  Pulse:  [67-86] 67  Resp:  [12-18] 18  SpO2:  [94 %-100 %] 97 %  BP: ()/(51-84) 135/78  Temp:  [97.5 °F (36.4 °C)-98.5 °F (36.9 °C)]   Temp: 98 °F (36.7 °C) (01/18/25 1132)  Pulse: 67 (01/18/25 1132)  Resp: 18 (01/18/25 1132)  BP: 135/78 (01/18/25 1132)  SpO2: 97 % (01/18/25 1132)    Intake/Output Summary (Last 24 hours) at 1/18/2025 1154  Last data filed at 1/18/2025 0614  Gross per 24 hour   Intake 1780 ml   Output --   Net 1780 ml       Physical Exam  General:  Morbidly obese middle-aged man lying quietly in bed in no acute distress   CVS: S1 and 2 heard, no murmurs appreciated   Respiratory: Clear to auscultation   Abdomen: Full, soft, nontender, no palpable organomegaly   Skin: No rash appreciated   CNS: No focal deficits   Musculoskeletal: No joint or bony abnormalities appreciated  Psych: Good mood, normal affect.      VAD:  PIV  ISOLATION:  Isolation for C diff infection     Wounds:  None    Significant Labs: All pertinent labs within the past 24 hours have been reviewed.    CBC LAST 7 DAYS  Recent Labs   Lab 01/15/25  1608 01/16/25  0505 01/17/25  0530 01/18/25  0600   WBC 11.82 7.71 8.77 8.07   RBC 4.65 4.11* 4.16* 3.94*   HGB 12.3* 10.7* 10.9* 10.3*   HCT 40.3 35.7* 36.9* 33.7*   MCV 87 87 89 86   MCH 26.5* 26.0* 26.2* 26.1*   MCHC 30.5* 30.0* 29.5* 30.6*   RDW 17.4* 17.3* 17.6* 17.5*    358 343 300   MPV 9.8 10.2 10.0 9.9   GRAN 70.8  8.4* 89.5*  6.9 88.0*  7.7 90.1*  7.3   LYMPH 14.1*  1.7 7.7*  0.6* 7.9*  0.7* 6.7*  0.5*   MONO 8.0  1.0 1.3*  0.1* 2.5*  0.2* 1.9*  0.2*   BASO 0.11 0.02 0.03 0.01  "  NRBC 0 0 0 0       CHEMISTRY LAST 7 DAYS  Recent Labs   Lab 01/15/25  1608 01/16/25  0505 01/17/25  0530 01/18/25  0600   * 135* 135* 135*   K 4.5 5.1 4.5 4.6    105 102 102   CO2 26 25 26 27   ANIONGAP 6* 5* 7* 6*   BUN 10 12 9 9   CREATININE 1.1 1.1 1.1 1.0   GLU 90 148* 136* 174*   CALCIUM 9.0 8.6* 8.8 8.5*   MG  --  1.7 1.7 1.6   ALBUMIN 3.7 3.3* 3.5 3.1*   PROT 7.6 6.6 7.0 6.2   ALKPHOS 75 67 65 54*   ALT 8* 6* 6* 9*   AST 10 8* 11 19   BILITOT 0.3 0.3 0.3 0.2       Estimated Creatinine Clearance: 111.4 mL/min (based on SCr of 1 mg/dL).    INFLAMMATORY/PROCAL  LAST 7 DAYS  No results for input(s): "PROCAL", "ESR", "CRP" in the last 168 hours.  No results found for: "ESR"  CRP   Date Value Ref Range Status   12/31/2024 2.80 (H) <1.00 mg/dL Final     Comment:     CRP-Normal Application expected values:   <1.0        mg/dL   Normal Range  1.0 - 5.0  mg/dL   Indicates mild inflammation  5.0 - 10.0 mg/dL   Indicates severe inflammation  >10.0        mg/dL   Represents serious processes and   frequently         indicates the presence of a bacterial   infection.      12/22/2024 2.10 (H) <1.00 mg/dL Final     Comment:     CRP-Normal Application expected values:   <1.0        mg/dL   Normal Range  1.0 - 5.0  mg/dL   Indicates mild inflammation  5.0 - 10.0 mg/dL   Indicates severe inflammation  >10.0        mg/dL   Represents serious processes and   frequently         indicates the presence of a bacterial   infection.      12/06/2024 1.90 (H) <1.00 mg/dL Final     Comment:     CRP-Normal Application expected values:   <1.0        mg/dL   Normal Range  1.0 - 5.0  mg/dL   Indicates mild inflammation  5.0 - 10.0 mg/dL   Indicates severe inflammation  >10.0        mg/dL   Represents serious processes and   frequently         indicates the presence of a bacterial   infection.      11/01/2024 2.90 (H) <1.00 mg/dL Final     Comment:     CRP-Normal Application expected values:   <1.0        mg/dL   Normal " Range  1.0 - 5.0  mg/dL   Indicates mild inflammation  5.0 - 10.0 mg/dL   Indicates severe inflammation  >10.0        mg/dL   Represents serious processes and   frequently         indicates the presence of a bacterial   infection.      08/11/2024 4.20 (H) <1.00 mg/dL Final     Comment:     CRP-Normal Application expected values:   <1.0        mg/dL   Normal Range  1.0 - 5.0  mg/dL   Indicates mild inflammation  5.0 - 10.0 mg/dL   Indicates severe inflammation  >10.0        mg/dL   Represents serious processes and   frequently         indicates the presence of a bacterial   infection.      07/15/2024 1.80 (H) <1.00 mg/dL Final     Comment:     CRP-Normal Application expected values:   <1.0        mg/dL   Normal Range  1.0 - 5.0  mg/dL   Indicates mild inflammation  5.0 - 10.0 mg/dL   Indicates severe inflammation  >10.0        mg/dL   Represents serious processes and   frequently         indicates the presence of a bacterial   infection.      07/12/2024 1.30 (H) <1.00 mg/dL Final     Comment:     CRP-Normal Application expected values:   <1.0        mg/dL   Normal Range  1.0 - 5.0  mg/dL   Indicates mild inflammation  5.0 - 10.0 mg/dL   Indicates severe inflammation  >10.0        mg/dL   Represents serious processes and   frequently         indicates the presence of a bacterial   infection.          PRIOR  MICROBIOLOGY:    No results found for the last 90 days.      LAST 7 DAYS MICROBIOLOGY   Microbiology Results (last 7 days)       Procedure Component Value Units Date/Time    Stool culture [[id number]] Collected: 01/16/25 1045    Order Status: Completed Specimen: Stool Updated: 01/18/25 1124     Stool Culture No Salmonella,Shigella,Vibrio,Campylobacter.      No E coli 0157:H7 isolated.    Blood culture [[id number]] Collected: 01/16/25 0505    Order Status: Completed Specimen: Blood Updated: 01/18/25 0832     Blood Culture, Routine No Growth to date      No Growth to date      No Growth to date               CURRENT/PREVIOUS VISIT EKG  Results for orders placed or performed during the hospital encounter of 01/15/25   EKG 12-lead    Collection Time: 01/16/25  9:40 AM   Result Value Ref Range    QRS Duration 76 ms    [facility] QTC Calculation 443 ms    Narrative    Test Reason : R07.9,    Vent. Rate :  76 BPM     Atrial Rate :  76 BPM     P-R Int : 156 ms          QRS Dur :  76 ms      QT Int : 394 ms       P-R-T Axes :  42  23  36 degrees    QTcB Int : 443 ms    Normal sinus rhythm  Normal ECG  No previous ECGs available    Referred By: AAAREFERRAL SELF           Confirmed By:       Significant Imaging: I have reviewed all relevant and available imaging results/findings within the past 24 hours.    I spent a total of 50 minutes on the day of the visit.This includes face to face time and non-face to face time preparing to see the patient (eg, review of tests), obtaining and/or reviewing separately obtained history, documenting clinical information in the electronic or other health record, independently interpreting results and communicating results to the patient/family/caregiver, or care coordinator.    Toan Dickinson MD  Date of Service: 01/18/2025      This note was created using [employer] voice recognition software that occasionally misinterpreted phrases or words.

## 2025-01-18 NOTE — ASSESSMENT & PLAN NOTE
Pt on budesonide caps which will be changed to iv solumedrol at the moment   Iv fluids  Added iv flagyl  Wont add quinolone because it can worsen C diff colitis  Other conservative measures   Consult to GI  Colonoscopy and sigmoidoscopy 1/17/25 with UC involvement to the rectum, sigmoid colon, descending colon and transverse colon with sparing of the right ascending colon.  Plan on following up outpatient for colectomy with Dr. Church

## 2025-01-18 NOTE — PROGRESS NOTES
[facility] Medicine  Progress Note    Patient Name: Jacoby Jean-Baptiste  MRN: [id number]  Patient Class: IP- Inpatient   Admission Date: 1/15/2025  Length of Stay: 1 days  Attending Physician: Long Arizmendi MD  Primary Care Provider: Hunter Aguilar III, MD        Subjective     Principal Problem:C. difficile colitis        HPI:  50 year old pt getting admitted with C diff colitis and possible UC flare  Pt was in hospital recently with C diff colitis and went home with dificid  Pt is on steroids for UC  He did well after discharge  Later started having abdominal pain all over/crampy associated with nausea  This was followed by vomiting episodes and  weakness  He came back to ER and got admitted     Overview/Hospital Course:  The patient was admitted to the hospital medicine service and was monitored closely.  The patient was started on fidaxomicin and Flagyl.  The patient was also given IV steroids for possible UC flare.  Infectious Disease and GI were consulted.  The patient was started on Dificid for C difficile.  He was given IV pain medications for abdominal pain.  He was taken for a colonoscopy and sigmoidoscopy for biopsy and mapping for future possible colectomy with Dr. Monroy outpatient.  Colonoscopy revealed UC involvement of the rectum, sigmoid colon, descending colon and transverse colon with the right side of the colon spared.       Interval History:  Patient evaluated at bedside.  Status post colonoscopy yesterday.  Still with pain requiring IV morphine.  Discussed with the patient transition to oral pain medications in preparation for discharge in 24 hours.    Review of Systems   Constitutional:  Positive for activity change and appetite change. Negative for chills and fever.   Cardiovascular:  Negative for chest pain.   Gastrointestinal:  Positive for abdominal pain and diarrhea. Negative for nausea and vomiting.     Objective:     Vital Signs (Most Recent):  Temp: 98 °F (36.7  °C) (01/18/25 1132)  Pulse: 67 (01/18/25 1132)  Resp: 18 (01/18/25 1132)  BP: 135/78 (01/18/25 1132)  SpO2: 97 % (01/18/25 1132) Vital Signs (24h Range):  Temp:  [97.5 °F (36.4 °C)-98.5 °F (36.9 °C)] 98 °F (36.7 °C)  Pulse:  [67-86] 67  Resp:  [12-18] 18  SpO2:  [94 %-100 %] 97 %  BP: ()/(51-84) 135/78     Weight: 133 kg (293 lb 3.4 oz)  Body mass index is 48.79 kg/m².    Intake/Output Summary (Last 24 hours) at 1/18/2025 1255  Last data filed at 1/18/2025 0614  Gross per 24 hour   Intake 1780 ml   Output --   Net 1780 ml         Physical Exam  Constitutional:       Appearance: Normal appearance. He is obese. He is not toxic-appearing.   HENT:      Nose: Nose normal.      Mouth/Throat:      Mouth: Mucous membranes are dry.      Pharynx: Oropharynx is clear.   Eyes:      Extraocular Movements: Extraocular movements intact.      Conjunctiva/sclera: Conjunctivae normal.   Cardiovascular:      Rate and Rhythm: Normal rate and regular rhythm.      Pulses: Normal pulses.      Heart sounds: Normal heart sounds.   Pulmonary:      Effort: Pulmonary effort is normal.      Breath sounds: Normal breath sounds.   Abdominal:      General: Bowel sounds are normal. There is no distension.      Palpations: Abdomen is soft.      Tenderness: There is abdominal tenderness. There is no guarding.   Skin:     General: Skin is warm.      Capillary Refill: Capillary refill takes less than 2 seconds.   Neurological:      Mental Status: He is alert and oriented to person, place, and time.   Psychiatric:         Mood and Affect: Mood normal.             Significant Labs: All pertinent labs within the past 24 hours have been reviewed.  Recent Lab Results         01/18/25  0600        Albumin 3.1       ALP 54       ALT 9       Anion Gap 6       AST 19       Baso # 0.01       Basophil % 0.1       BILIRUBIN TOTAL 0.2  Comment: For infants and newborns, interpretation of results should be based  on gestational age, weight and in agreement  with clinical  observations.    Premature Infant recommended reference ranges:  Up to 24 hours.............<8.0 mg/dL  Up to 48 hours............<12.0 mg/dL  3-5 days..................<15.0 mg/dL  6-29 days.................<15.0 mg/dL         BUN 9       Calcium 8.5       Chloride 102       CO2 27       Creatinine 1.0       Differential Method Automated       eGFR >60.0       Eos # 0.0       Eos % 0.0       Glucose 174       Gran # (ANC) 7.3       Gran % 90.1       Hematocrit 33.7       Hemoglobin 10.3       Immature Grans (Abs) 0.10  Comment: Mild elevation in immature granulocytes is non specific and   can be seen in a variety of conditions including stress response,   acute inflammation, trauma and pregnancy. Correlation with other   laboratory and clinical findings is essential.         Immature Granulocytes 1.2       Lymph # 0.5       Lymph % 6.7       Magnesium  1.6       MCH 26.1       MCHC 30.6       MCV 86       Mono # 0.2       Mono % 1.9       MPV 9.9       nRBC 0       Platelet Count 300       Potassium 4.6       PROTEIN TOTAL 6.2       RBC 3.94       RDW 17.5       Sodium 135       WBC 8.07               Significant Imaging: I have reviewed all pertinent imaging results/findings within the past 24 hours.    Assessment and Plan     * C. difficile colitis  Maintain PO dificid  Other conservative measures  CT abd and pelvis with IV contrast revealed concentric wall thickening involving the left hemicolon surrounding pericolonic inflammatory changes compatible with change of acute colitis with greater inflammatory change from previous exam  ID consulted      Recurrent Clostridioides difficile infection  Aware       History of DVT (deep vein thrombosis)  Maintain NOAC which pt is on at home      Long-term use of immunosuppressant medication  Aware       Chest pain  Resolved with GI cocktail  EKG with normal sinus rhythm no ischemic changes  Troponin negative, 2nd troponin pending  Chest x-ray without acute  findings      PUD (peptic ulcer disease)  Pepcid  Wont give PPI because of C Diff       Exacerbation of ulcerative colitis without complication  Pt on budesonide caps which will be changed to iv solumedrol at the moment   Iv fluids  Added iv flagyl  Wont add quinolone because it can worsen C diff colitis  Other conservative measures   Consult to GI  Colonoscopy and sigmoidoscopy 1/17/25 with UC involvement to the rectum, sigmoid colon, descending colon and transverse colon with sparing of the right ascending colon.  Plan on following up outpatient for colectomy with Dr. Church      Type 2 diabetes mellitus without complication, without long-term current use of insulin  Maintain present regime       Morbid obesity  Body mass index is 48.79 kg/m². Morbid obesity complicates all aspects of disease management from diagnostic modalities to treatment.      VTE Risk Mitigation (From admission, onward)           Ordered     rivaroxaban tablet 20 mg  With dinner         01/15/25 1744     IP VTE HIGH RISK PATIENT  Once         01/15/25 1744     Place sequential compression device  Until discontinued         01/15/25 1744                    Discharge Planning   GERARDO: 1/18/2025     Code Status: Full Code   Medical Readiness for Discharge Date:   Discharge Plan A: Home Health                        JAMIA Jordan  Department of Hospital Medicine   [facility]

## 2025-01-18 NOTE — SUBJECTIVE & OBJECTIVE
Interval History:  Patient evaluated at bedside.  Status post colonoscopy yesterday.  Still with pain requiring IV morphine.  Discussed with the patient transition to oral pain medications in preparation for discharge in 24 hours.    Review of Systems   Constitutional:  Positive for activity change and appetite change. Negative for chills and fever.   Cardiovascular:  Negative for chest pain.   Gastrointestinal:  Positive for abdominal pain and diarrhea. Negative for nausea and vomiting.     Objective:     Vital Signs (Most Recent):  Temp: 98 °F (36.7 °C) (01/18/25 1132)  Pulse: 67 (01/18/25 1132)  Resp: 18 (01/18/25 1132)  BP: 135/78 (01/18/25 1132)  SpO2: 97 % (01/18/25 1132) Vital Signs (24h Range):  Temp:  [97.5 °F (36.4 °C)-98.5 °F (36.9 °C)] 98 °F (36.7 °C)  Pulse:  [67-86] 67  Resp:  [12-18] 18  SpO2:  [94 %-100 %] 97 %  BP: ()/(51-84) 135/78     Weight: 133 kg (293 lb 3.4 oz)  Body mass index is 48.79 kg/m².    Intake/Output Summary (Last 24 hours) at 1/18/2025 1255  Last data filed at 1/18/2025 0614  Gross per 24 hour   Intake 1780 ml   Output --   Net 1780 ml         Physical Exam  Constitutional:       Appearance: Normal appearance. He is obese. He is not toxic-appearing.   HENT:      Nose: Nose normal.      Mouth/Throat:      Mouth: Mucous membranes are dry.      Pharynx: Oropharynx is clear.   Eyes:      Extraocular Movements: Extraocular movements intact.      Conjunctiva/sclera: Conjunctivae normal.   Cardiovascular:      Rate and Rhythm: Normal rate and regular rhythm.      Pulses: Normal pulses.      Heart sounds: Normal heart sounds.   Pulmonary:      Effort: Pulmonary effort is normal.      Breath sounds: Normal breath sounds.   Abdominal:      General: Bowel sounds are normal. There is no distension.      Palpations: Abdomen is soft.      Tenderness: There is abdominal tenderness. There is no guarding.   Skin:     General: Skin is warm.      Capillary Refill: Capillary refill takes less  than 2 seconds.   Neurological:      Mental Status: He is alert and oriented to person, place, and time.   Psychiatric:         Mood and Affect: Mood normal.             Significant Labs: All pertinent labs within the past 24 hours have been reviewed.  Recent Lab Results         01/18/25  0600        Albumin 3.1       ALP 54       ALT 9       Anion Gap 6       AST 19       Baso # 0.01       Basophil % 0.1       BILIRUBIN TOTAL 0.2  Comment: For infants and newborns, interpretation of results should be based  on gestational age, weight and in agreement with clinical  observations.    Premature Infant recommended reference ranges:  Up to 24 hours.............<8.0 mg/dL  Up to 48 hours............<12.0 mg/dL  3-5 days..................<15.0 mg/dL  6-29 days.................<15.0 mg/dL         BUN 9       Calcium 8.5       Chloride 102       CO2 27       Creatinine 1.0       Differential Method Automated       eGFR >60.0       Eos # 0.0       Eos % 0.0       Glucose 174       Gran # (ANC) 7.3       Gran % 90.1       Hematocrit 33.7       Hemoglobin 10.3       Immature Grans (Abs) 0.10  Comment: Mild elevation in immature granulocytes is non specific and   can be seen in a variety of conditions including stress response,   acute inflammation, trauma and pregnancy. Correlation with other   laboratory and clinical findings is essential.         Immature Granulocytes 1.2       Lymph # 0.5       Lymph % 6.7       Magnesium  1.6       MCH 26.1       MCHC 30.6       MCV 86       Mono # 0.2       Mono % 1.9       MPV 9.9       nRBC 0       Platelet Count 300       Potassium 4.6       PROTEIN TOTAL 6.2       RBC 3.94       RDW 17.5       Sodium 135       WBC 8.07               Significant Imaging: I have reviewed all pertinent imaging results/findings within the past 24 hours.

## 2025-01-19 VITALS
DIASTOLIC BLOOD PRESSURE: 83 MMHG | WEIGHT: 293.19 LBS | HEIGHT: 65 IN | BODY MASS INDEX: 48.85 KG/M2 | SYSTOLIC BLOOD PRESSURE: 128 MMHG | OXYGEN SATURATION: 98 % | RESPIRATION RATE: 18 BRPM | HEART RATE: 64 BPM | TEMPERATURE: 98 F

## 2025-01-19 LAB
ALBUMIN SERPL BCP-MCNC: 3.2 G/DL (ref 3.5–5.2)
ALP SERPL-CCNC: 50 U/L (ref 55–135)
ALT SERPL W/O P-5'-P-CCNC: 11 U/L (ref 10–44)
ANION GAP SERPL CALC-SCNC: 4 MMOL/L (ref 8–16)
AST SERPL-CCNC: 15 U/L (ref 10–40)
BASOPHILS # BLD AUTO: 0.02 K/UL (ref 0–0.2)
BASOPHILS NFR BLD: 0.2 % (ref 0–1.9)
BILIRUB SERPL-MCNC: 0.3 MG/DL (ref 0.1–1)
BUN SERPL-MCNC: 11 MG/DL (ref 6–20)
CALCIUM SERPL-MCNC: 8.8 MG/DL (ref 8.7–10.5)
CHLORIDE SERPL-SCNC: 100 MMOL/L (ref 95–110)
CO2 SERPL-SCNC: 29 MMOL/L (ref 23–29)
CREAT SERPL-MCNC: 1.1 MG/DL (ref 0.5–1.4)
DIFFERENTIAL METHOD BLD: ABNORMAL
EOSINOPHIL # BLD AUTO: 0 K/UL (ref 0–0.5)
EOSINOPHIL NFR BLD: 0 % (ref 0–8)
ERYTHROCYTE [DISTWIDTH] IN BLOOD BY AUTOMATED COUNT: 17.2 % (ref 11.5–14.5)
EST. GFR  (NO RACE VARIABLE): >60 ML/MIN/1.73 M^2
GLUCOSE SERPL-MCNC: 182 MG/DL (ref 70–110)
HCT VFR BLD AUTO: 32.8 % (ref 40–54)
HGB BLD-MCNC: 10.2 G/DL (ref 14–18)
IMM GRANULOCYTES # BLD AUTO: 0.1 K/UL (ref 0–0.04)
IMM GRANULOCYTES NFR BLD AUTO: 1 % (ref 0–0.5)
LYMPHOCYTES # BLD AUTO: 0.6 K/UL (ref 1–4.8)
LYMPHOCYTES NFR BLD: 6.7 % (ref 18–48)
MAGNESIUM SERPL-MCNC: 1.6 MG/DL (ref 1.6–2.6)
MCH RBC QN AUTO: 26.1 PG (ref 27–31)
MCHC RBC AUTO-ENTMCNC: 31.1 G/DL (ref 32–36)
MCV RBC AUTO: 84 FL (ref 82–98)
MONOCYTES # BLD AUTO: 0.2 K/UL (ref 0.3–1)
MONOCYTES NFR BLD: 2 % (ref 4–15)
NEUTROPHILS # BLD AUTO: 8.6 K/UL (ref 1.8–7.7)
NEUTROPHILS NFR BLD: 90.1 % (ref 38–73)
NRBC BLD-RTO: 0 /100 WBC
PLATELET # BLD AUTO: 304 K/UL (ref 150–450)
PMV BLD AUTO: 9.7 FL (ref 9.2–12.9)
POTASSIUM SERPL-SCNC: 4.4 MMOL/L (ref 3.5–5.1)
PROT SERPL-MCNC: 6.3 G/DL (ref 6–8.4)
RBC # BLD AUTO: 3.91 M/UL (ref 4.6–6.2)
SODIUM SERPL-SCNC: 133 MMOL/L (ref 136–145)
WBC # BLD AUTO: 9.59 K/UL (ref 3.9–12.7)

## 2025-01-19 PROCEDURE — 85025 COMPLETE CBC W/AUTO DIFF WBC: CPT | Performed by: INTERNAL MEDICINE

## 2025-01-19 PROCEDURE — 25000003 PHARM REV CODE 250: Performed by: INTERNAL MEDICINE

## 2025-01-19 PROCEDURE — 25000003 PHARM REV CODE 250: Performed by: NURSE PRACTITIONER

## 2025-01-19 PROCEDURE — 63600175 PHARM REV CODE 636 W HCPCS: Performed by: NURSE PRACTITIONER

## 2025-01-19 PROCEDURE — 80053 COMPREHEN METABOLIC PANEL: CPT | Performed by: INTERNAL MEDICINE

## 2025-01-19 PROCEDURE — 63600175 PHARM REV CODE 636 W HCPCS: Performed by: INTERNAL MEDICINE

## 2025-01-19 PROCEDURE — 83735 ASSAY OF MAGNESIUM: CPT | Performed by: INTERNAL MEDICINE

## 2025-01-19 PROCEDURE — 36415 COLL VENOUS BLD VENIPUNCTURE: CPT | Performed by: INTERNAL MEDICINE

## 2025-01-19 RX ORDER — OXYCODONE AND ACETAMINOPHEN 10; 325 MG/1; MG/1
1 TABLET ORAL EVERY 6 HOURS PRN
Qty: 20 TABLET | Refills: 0 | Status: SHIPPED | OUTPATIENT
Start: 2025-01-19 | End: 2025-01-24

## 2025-01-19 RX ADMIN — FAMOTIDINE 20 MG: 20 TABLET, FILM COATED ORAL at 08:01

## 2025-01-19 RX ADMIN — HYDROCODONE BITARTRATE AND ACETAMINOPHEN 1 TABLET: 10; 325 TABLET ORAL at 07:01

## 2025-01-19 RX ADMIN — BUSPIRONE HYDROCHLORIDE 15 MG: 5 TABLET ORAL at 08:01

## 2025-01-19 RX ADMIN — ACETAMINOPHEN 650 MG: 325 TABLET ORAL at 08:01

## 2025-01-19 RX ADMIN — ONDANSETRON 4 MG: 2 INJECTION INTRAMUSCULAR; INTRAVENOUS at 01:01

## 2025-01-19 RX ADMIN — FIDAXOMICIN 200 MG: 200 TABLET, FILM COATED ORAL at 08:01

## 2025-01-19 RX ADMIN — MORPHINE SULFATE 4 MG: 4 INJECTION, SOLUTION INTRAMUSCULAR; INTRAVENOUS at 01:01

## 2025-01-19 RX ADMIN — ESCITALOPRAM OXALATE 20 MG: 10 TABLET ORAL at 08:01

## 2025-01-19 RX ADMIN — METOPROLOL SUCCINATE 50 MG: 50 TABLET, FILM COATED, EXTENDED RELEASE ORAL at 08:01

## 2025-01-19 RX ADMIN — COLCHICINE 0.6 MG: 0.6 TABLET, FILM COATED ORAL at 08:01

## 2025-01-19 NOTE — PLAN OF CARE
Patient cleared for discharge from case management standpoint.    Chart and discharge orders reviewed.  Patient discharged home with no further case management needs.        01/19/25 1116   Final Note   Assessment Type Final Discharge Note   Anticipated Discharge Disposition Home-Health   What phone number can be called within the next 1-3 days to see how you are doing after discharge? [phone]   Post-Acute Status   Discharge Delays None known at this time

## 2025-01-19 NOTE — PLAN OF CARE
CM/ALENA followed up with then Pt regarding HH and if he had a choice. Pt said he is currently using [facility] and he wants to continue using their service. GINNA reached out to [facility] [phone]. Spoke to the Answering service left a message for the Nurse to return the call according to the answering service the person who will return my call is Haylie to confirm if they are going to accept the pt back.

## 2025-01-19 NOTE — DISCHARGE SUMMARY
[facility] Medicine  Discharge Summary      Patient Name: Jacoby Jean-Baptiste  MRN: [id number]  DAYSI: [id number]  Patient Class: IP- Inpatient  Admission Date: 1/15/2025  Hospital Length of Stay: 2 days  Discharge Date and Time:  01/19/2025 11:56 AM  Attending Physician: Karyn att. providers found   Discharging Provider: JAMIA Jordan  Primary Care Provider: Hunter Aguilar III, MD    Primary Care Team: Networked reference to record PCT     HPI:   50 year old pt getting admitted with C diff colitis and possible UC flare  Pt was in hospital recently with C diff colitis and went home with dificid  Pt is on steroids for UC  He did well after discharge  Later started having abdominal pain all over/crampy associated with nausea  This was followed by vomiting episodes and  weakness  He came back to ER and got admitted     Procedure(s) (LRB):  COLONOSCOPY (N/A)      Hospital Course:   The patient was admitted to the hospital medicine service and was monitored closely.  The patient was started on fidaxomicin and Flagyl.  The patient was also given IV steroids for possible UC flare.  Infectious Disease and GI were consulted.  The patient was started on Dificid for C difficile.  He was given IV pain medications for abdominal pain.  He was taken for a colonoscopy and sigmoidoscopy for biopsy and mapping for future possible colectomy with Dr. Monroy outpatient.  Colonoscopy revealed UC involvement of the rectum, sigmoid colon, descending colon and transverse colon with the right side of the colon spared.  The GI, patient can be discharged home on p.o. budesonide as well as Dificid for 10 days.  He is to follow up with GI, Infectious Disease and PCP in 1-2 weeks.  He is also to follow up with Colorectal surgery for evaluation for colectomy.  Return to the ER for any concerning symptoms.       Goals of Care Treatment Preferences:  Code Status: Full Code      SDOH Screening:  The patient declined to be  screened for utility difficulties, food insecurity, transport difficulties, housing insecurity, and interpersonal safety, so no concerns could be identified this admission.     Consults:   Consults (From admission, onward)          Status Ordering Provider     Inpatient consult to Infectious Diseases  Once        Provider:  Toan Dickinson MD    Completed BRIGIDA DEL CASTILLO     Inpatient consult to Gastroenterology  Once        Provider:  Odalis Mccabe MD    Completed BRIGIDA DEL CASTILLO            * C. difficile colitis  Maintain PO dificid  Other conservative measures  CT abd and pelvis with IV contrast revealed concentric wall thickening involving the left hemicolon surrounding pericolonic inflammatory changes compatible with change of acute colitis with greater inflammatory change from previous exam  ID consulted      Recurrent Clostridioides difficile infection  Aware       History of DVT (deep vein thrombosis)  Maintain NOAC which pt is on at home      Long-term use of immunosuppressant medication  Aware       Chest pain  Resolved with GI cocktail  EKG with normal sinus rhythm no ischemic changes  Troponin negative, 2nd troponin pending  Chest x-ray without acute findings      PUD (peptic ulcer disease)  Pepcid  Wont give PPI because of C Diff       Exacerbation of ulcerative colitis without complication  Pt on budesonide caps which will be changed to iv solumedrol at the moment   Iv fluids  Added iv flagyl  Wont add quinolone because it can worsen C diff colitis  Other conservative measures   Consult to GI  Colonoscopy and sigmoidoscopy 1/17/25 with UC involvement to the rectum, sigmoid colon, descending colon and transverse colon with sparing of the right ascending colon.  Plan on following up outpatient for colectomy with Dr. Church      Type 2 diabetes mellitus without complication, without long-term current use of insulin  Maintain present regime       Morbid obesity  Body mass index is 48.79 kg/m². Morbid  obesity complicates all aspects of disease management from diagnostic modalities to treatment.      Final Active Diagnoses:    Diagnosis Date Noted POA    PRINCIPAL PROBLEM:  C. difficile colitis [A04.72] 01/15/2025 Yes    Recurrent Clostridioides difficile infection [A49.8] 01/10/2025 Yes    History of DVT (deep vein thrombosis) [Z86.718] 11/04/2024 Not Applicable    Long-term use of immunosuppressant medication [Z79.60] 12/02/2023 Not Applicable    Chest pain [R07.9] 11/17/2023 Yes    PUD (peptic ulcer disease) [K27.9] 04/24/2023 Yes    Exacerbation of ulcerative colitis without complication [K51.90] 04/20/2023 Yes    Type 2 diabetes mellitus without complication, without long-term current use of insulin [E11.9] 01/29/2022 Yes    Morbid obesity [E66.01] 02/03/2021 Yes      Problems Resolved During this Admission:       Discharged Condition: good    Disposition: Home or Self Care    Follow Up:   Follow-up Information       Hunter Aguilar III, MD Follow up in 1 week(s).    Specialty: Family Medicine  Contact information:  [address]  [address]  [place] [address]  [phone]               Odalis Mccabe MD Follow up in 1 week(s).    Specialty: Gastroenterology  Contact information:  [address]  [place] [address]  [phone]               Ophelia Kramer MD Follow up in 1 week(s).    Specialty: Infectious Diseases  Contact information:  [address]  [address]  [place] [address]  [phone]               Jessica Church MD Follow up in 2 week(s).    Specialty: Colon and Rectal Surgery  Why: as scheduled  Contact information:  [address]  [address]  [place] [address] [phone]                           Patient Instructions:      Diet Adult Regular     Notify your health care provider if you experience any of the following:  temperature >100.4     Notify your health care provider if you experience any of the following:  persistent nausea and vomiting or diarrhea      Notify your health care provider if you experience any of the following:  severe uncontrolled pain     Notify your health care provider if you experience any of the following:  redness, tenderness, or signs of infection (pain, swelling, redness, odor or green/yellow discharge around incision site)     Notify your health care provider if you experience any of the following:  difficulty breathing or increased cough     Notify your health care provider if you experience any of the following:  severe persistent headache     Notify your health care provider if you experience any of the following:  worsening rash     Notify your health care provider if you experience any of the following:  persistent dizziness, light-headedness, or visual disturbances     Notify your health care provider if you experience any of the following:  increased confusion or weakness     Activity as tolerated       Significant Diagnostic Studies: Labs: All labs within the past 24 hours have been reviewed    Pending Diagnostic Studies:       Procedure Component Value Units Date/Time    Calprotectin, Stool [[id number]] Collected: 01/16/25 1539    Order Status: Sent Lab Status: In process Updated: 01/16/25 5382    Specimen: Stool            Medications:  Reconciled Home Medications:      Medication List        START taking these medications      oxyCODONE-acetaminophen  mg per tablet  Commonly known as: PERCOCET  Take 1 tablet by mouth every 6 (six) hours as needed for Pain.            CONTINUE taking these medications      budesonide 3 mg capsule  Commonly known as: ENTOCORT EC  Take 3 capsules (9 mg total) by mouth once daily.     busPIRone 15 MG tablet  Commonly known as: BUSPAR  Take 1 tablet (15 mg total) by mouth 3 (three) times daily.     colchicine 0.6 mg tablet  Commonly known as: COLCRYS  Take 1 tablet (0.6 mg total) by mouth 2 (two) times daily.     diphenoxylate-atropine 2.5-0.025 mg 2.5-0.025 mg per tablet  Commonly known as:  LOMOTIL  Take 1 tablet by mouth 4 (four) times daily as needed for Diarrhea.     EScitalopram oxalate 20 MG tablet  Commonly known as: LEXAPRO  Take 1 tablet (20 mg total) by mouth once daily.     fidaxomicin 200 mg Tab  Commonly known as: DIFICID  Take 1 tablet (200 mg total) by mouth 2 (two) times daily. for 10 days     hyoscyamine 0.375 mg Tb12  Commonly known as: Levbid  Take 0.375 mg by mouth 2 (two) times daily.     LORazepam 0.5 MG tablet  Commonly known as: ATIVAN  Take 1 tablet (0.5 mg total) by mouth every 6 (six) hours as needed for Anxiety.     magnesium oxide 400 mg (241.3 mg magnesium) tablet  Commonly known as: MAG-OX  Take 1 tablet (400 mg total) by mouth 2 (two) times daily.     metoclopramide HCl 5 MG tablet  Commonly known as: REGLAN  Take 1 tablet (5 mg total) by mouth every 8 (eight) hours as needed (take as needed before meals).     metoprolol succinate 50 MG 24 hr tablet  Commonly known as: TOPROL-XL  Take 1 tablet (50 mg total) by mouth once daily.     promethazine 25 MG tablet  Commonly known as: PHENERGAN  Take 25 mg by mouth 4 (four) times daily as needed for Nausea.     rivaroxaban 20 mg Tab  Commonly known as: XARELTO  Take 1 tablet (20 mg total) by mouth daily with dinner or evening meal.     simvastatin 20 MG tablet  Commonly known as: ZOCOR  Take 1 tablet (20 mg total) by mouth every evening.     VSL#3 112.5 billion cell Cap  Generic drug: Lactobac 2-Bifido 1-S. therm  Take 1 capsule by mouth twice a day     zolpidem 10 mg Tab  Commonly known as: AMBIEN  Take 1 tablet (10 mg total) by mouth nightly as needed (insomnia).            STOP taking these medications      HYDROcodone-acetaminophen  mg per tablet  Commonly known as: NORCO            ASK your doctor about these medications      pantoprazole 40 MG tablet  Commonly known as: PROTONIX  Take 40 mg by mouth 2 (two) times daily.     ZINPLAVA 25 mg/mL Soln injection  Generic drug: bezlotoxumab  Inject 10 mg/kg intravenously  single dose, during antibacterial treatment              Indwelling Lines/Drains at time of discharge:   Lines/Drains/Airways       None                   Time spent on the discharge of patient: 35 minutes         JAMIA Jordan  Department of Hospital Medicine  [facility]

## 2025-01-20 LAB
OHS QRS DURATION: 76 MS
OHS QTC CALCULATION: 443 MS

## 2025-01-21 LAB — BACTERIA BLD CULT: NORMAL

## 2025-01-22 LAB — CALPROTECTIN STL-MCNT: 6750 UG/G (ref 0–120)

## 2025-01-24 RX ORDER — ESCITALOPRAM OXALATE 20 MG/1
20 TABLET ORAL DAILY
Qty: 90 TABLET | Refills: 0 | Status: SHIPPED | OUTPATIENT
Start: 2025-01-24

## 2025-01-24 NOTE — TELEPHONE ENCOUNTER
No care due was identified.  [facility] Embedded Care Due Messages. Reference number: [id number].   1/24/2025 10:13:13 AM CST

## 2025-01-28 ENCOUNTER — HOSPITAL ENCOUNTER (INPATIENT)
Facility: HOSPITAL | Age: 50
LOS: 3 days | Discharge: HOME | DRG: 386 | End: 2025-01-31
Attending: STUDENT IN AN ORGANIZED HEALTH CARE EDUCATION/TRAINING PROGRAM | Admitting: INTERNAL MEDICINE
Payer: COMMERCIAL

## 2025-01-28 DIAGNOSIS — Z87.19 HISTORY OF ULCERATIVE COLITIS: ICD-10-CM

## 2025-01-28 DIAGNOSIS — R11.0 NAUSEA: Primary | ICD-10-CM

## 2025-01-28 DIAGNOSIS — R07.9 CHEST PAIN: ICD-10-CM

## 2025-01-28 DIAGNOSIS — K92.2 GI BLEED: ICD-10-CM

## 2025-01-28 DIAGNOSIS — K52.9 COLITIS: ICD-10-CM

## 2025-01-28 DIAGNOSIS — K62.5 RECTAL BLEEDING: ICD-10-CM

## 2025-01-28 LAB
ABO + RH BLD: NORMAL
ALBUMIN SERPL BCP-MCNC: 3.7 G/DL (ref 3.5–5.2)
ALP SERPL-CCNC: 49 U/L (ref 55–135)
ALT SERPL W/O P-5'-P-CCNC: 15 U/L (ref 10–44)
ANION GAP SERPL CALC-SCNC: 6 MMOL/L (ref 8–16)
AST SERPL-CCNC: 17 U/L (ref 10–40)
BASOPHILS # BLD AUTO: 0.09 K/UL (ref 0–0.2)
BASOPHILS NFR BLD: 0.6 % (ref 0–1.9)
BILIRUB SERPL-MCNC: 0.4 MG/DL (ref 0.1–1)
BILIRUB UR QL STRIP: NEGATIVE
BLD GP AB SCN CELLS X3 SERPL QL: NORMAL
BUN SERPL-MCNC: 17 MG/DL (ref 6–20)
CALCIUM SERPL-MCNC: 9 MG/DL (ref 8.7–10.5)
CHLORIDE SERPL-SCNC: 105 MMOL/L (ref 95–110)
CLARITY UR: CLEAR
CO2 SERPL-SCNC: 27 MMOL/L (ref 23–29)
COLOR UR: YELLOW
CREAT SERPL-MCNC: 1.1 MG/DL (ref 0.5–1.4)
DIFFERENTIAL METHOD BLD: ABNORMAL
EOSINOPHIL # BLD AUTO: 0.6 K/UL (ref 0–0.5)
EOSINOPHIL NFR BLD: 3.4 % (ref 0–8)
ERYTHROCYTE [DISTWIDTH] IN BLOOD BY AUTOMATED COUNT: 19.3 % (ref 11.5–14.5)
EST. GFR  (NO RACE VARIABLE): >60 ML/MIN/1.73 M^2
GLUCOSE SERPL-MCNC: 94 MG/DL (ref 70–110)
GLUCOSE UR QL STRIP: NEGATIVE
HCT VFR BLD AUTO: 34.8 % (ref 40–54)
HGB BLD-MCNC: 10.7 G/DL (ref 14–18)
HGB UR QL STRIP: NEGATIVE
IMM GRANULOCYTES # BLD AUTO: 0.11 K/UL (ref 0–0.04)
IMM GRANULOCYTES NFR BLD AUTO: 0.7 % (ref 0–0.5)
KETONES UR QL STRIP: NEGATIVE
LACTATE SERPL-SCNC: 1 MMOL/L (ref 0.5–1.9)
LDH SERPL L TO P-CCNC: 2.36 MMOL/L (ref 0.5–2.2)
LEUKOCYTE ESTERASE UR QL STRIP: NEGATIVE
LYMPHOCYTES # BLD AUTO: 1.5 K/UL (ref 1–4.8)
LYMPHOCYTES NFR BLD: 9.2 % (ref 18–48)
MCH RBC QN AUTO: 26.8 PG (ref 27–31)
MCHC RBC AUTO-ENTMCNC: 30.7 G/DL (ref 32–36)
MCV RBC AUTO: 87 FL (ref 82–98)
MONOCYTES # BLD AUTO: 1.3 K/UL (ref 0.3–1)
MONOCYTES NFR BLD: 8.2 % (ref 4–15)
NEUTROPHILS # BLD AUTO: 12.6 K/UL (ref 1.8–7.7)
NEUTROPHILS NFR BLD: 77.9 % (ref 38–73)
NITRITE UR QL STRIP: NEGATIVE
NRBC BLD-RTO: 0 /100 WBC
OB PNL STL: POSITIVE
OHS QRS DURATION: 72 MS
OHS QTC CALCULATION: 436 MS
PH UR STRIP: 8 [PH] (ref 5–8)
PLATELET # BLD AUTO: 262 K/UL (ref 150–450)
PMV BLD AUTO: 10.3 FL (ref 9.2–12.9)
POTASSIUM SERPL-SCNC: 4.5 MMOL/L (ref 3.5–5.1)
PROT SERPL-MCNC: 6.8 G/DL (ref 6–8.4)
PROT UR QL STRIP: NEGATIVE
RBC # BLD AUTO: 4 M/UL (ref 4.6–6.2)
SAMPLE: ABNORMAL
SODIUM SERPL-SCNC: 138 MMOL/L (ref 136–145)
SP GR UR STRIP: >1.03 (ref 1–1.03)
SPECIMEN OUTDATE: NORMAL
URN SPEC COLLECT METH UR: ABNORMAL
UROBILINOGEN UR STRIP-ACNC: NEGATIVE EU/DL
WBC # BLD AUTO: 16.16 K/UL (ref 3.9–12.7)

## 2025-01-28 PROCEDURE — 86901 BLOOD TYPING SEROLOGIC RH(D): CPT | Performed by: NURSE PRACTITIONER

## 2025-01-28 PROCEDURE — 83605 ASSAY OF LACTIC ACID: CPT | Performed by: STUDENT IN AN ORGANIZED HEALTH CARE EDUCATION/TRAINING PROGRAM

## 2025-01-28 PROCEDURE — 96375 TX/PRO/DX INJ NEW DRUG ADDON: CPT

## 2025-01-28 PROCEDURE — 25000003 PHARM REV CODE 250: Performed by: STUDENT IN AN ORGANIZED HEALTH CARE EDUCATION/TRAINING PROGRAM

## 2025-01-28 PROCEDURE — 93005 ELECTROCARDIOGRAM TRACING: CPT | Performed by: INTERNAL MEDICINE

## 2025-01-28 PROCEDURE — 96365 THER/PROPH/DIAG IV INF INIT: CPT

## 2025-01-28 PROCEDURE — 80053 COMPREHEN METABOLIC PANEL: CPT | Performed by: NURSE PRACTITIONER

## 2025-01-28 PROCEDURE — 81003 URINALYSIS AUTO W/O SCOPE: CPT | Performed by: NURSE PRACTITIONER

## 2025-01-28 PROCEDURE — 36415 COLL VENOUS BLD VENIPUNCTURE: CPT | Performed by: STUDENT IN AN ORGANIZED HEALTH CARE EDUCATION/TRAINING PROGRAM

## 2025-01-28 PROCEDURE — 12000002 HC ACUTE/MED SURGE SEMI-PRIVATE ROOM

## 2025-01-28 PROCEDURE — 93010 ELECTROCARDIOGRAM REPORT: CPT | Mod: ,,, | Performed by: INTERNAL MEDICINE

## 2025-01-28 PROCEDURE — 96361 HYDRATE IV INFUSION ADD-ON: CPT

## 2025-01-28 PROCEDURE — 96374 THER/PROPH/DIAG INJ IV PUSH: CPT | Mod: 59

## 2025-01-28 PROCEDURE — 63600175 PHARM REV CODE 636 W HCPCS: Mod: JZ,TB | Performed by: STUDENT IN AN ORGANIZED HEALTH CARE EDUCATION/TRAINING PROGRAM

## 2025-01-28 PROCEDURE — 85025 COMPLETE CBC W/AUTO DIFF WBC: CPT | Performed by: NURSE PRACTITIONER

## 2025-01-28 PROCEDURE — 25500020 PHARM REV CODE 255: Performed by: STUDENT IN AN ORGANIZED HEALTH CARE EDUCATION/TRAINING PROGRAM

## 2025-01-28 PROCEDURE — 82272 OCCULT BLD FECES 1-3 TESTS: CPT | Performed by: NURSE PRACTITIONER

## 2025-01-28 PROCEDURE — 25000003 PHARM REV CODE 250: Performed by: NURSE PRACTITIONER

## 2025-01-28 PROCEDURE — 87040 BLOOD CULTURE FOR BACTERIA: CPT | Mod: 59 | Performed by: STUDENT IN AN ORGANIZED HEALTH CARE EDUCATION/TRAINING PROGRAM

## 2025-01-28 PROCEDURE — 25000003 PHARM REV CODE 250: Performed by: INTERNAL MEDICINE

## 2025-01-28 PROCEDURE — 99285 EMERGENCY DEPT VISIT HI MDM: CPT | Mod: 25

## 2025-01-28 PROCEDURE — 63600175 PHARM REV CODE 636 W HCPCS: Performed by: NURSE PRACTITIONER

## 2025-01-28 RX ORDER — OXYCODONE AND ACETAMINOPHEN 5; 325 MG/1; MG/1
1 TABLET ORAL
Status: COMPLETED | OUTPATIENT
Start: 2025-01-28 | End: 2025-01-28

## 2025-01-28 RX ORDER — ACETAMINOPHEN 325 MG/1
650 TABLET ORAL EVERY 8 HOURS PRN
Status: DISCONTINUED | OUTPATIENT
Start: 2025-01-28 | End: 2025-01-28

## 2025-01-28 RX ORDER — TALC
6 POWDER (GRAM) TOPICAL NIGHTLY PRN
Status: DISCONTINUED | OUTPATIENT
Start: 2025-01-28 | End: 2025-01-31 | Stop reason: HOSPADM

## 2025-01-28 RX ORDER — HYDROCODONE BITARTRATE AND ACETAMINOPHEN 5; 325 MG/1; MG/1
1 TABLET ORAL EVERY 6 HOURS PRN
Status: DISCONTINUED | OUTPATIENT
Start: 2025-01-28 | End: 2025-01-28

## 2025-01-28 RX ORDER — SODIUM,POTASSIUM PHOSPHATES 280-250MG
2 POWDER IN PACKET (EA) ORAL
Status: DISCONTINUED | OUTPATIENT
Start: 2025-01-28 | End: 2025-01-31 | Stop reason: HOSPADM

## 2025-01-28 RX ORDER — GLUCAGON 1 MG
1 KIT INJECTION
Status: DISCONTINUED | OUTPATIENT
Start: 2025-01-28 | End: 2025-01-31 | Stop reason: HOSPADM

## 2025-01-28 RX ORDER — COLCHICINE 0.6 MG/1
0.6 TABLET, FILM COATED ORAL 2 TIMES DAILY
Status: DISCONTINUED | OUTPATIENT
Start: 2025-01-28 | End: 2025-01-30

## 2025-01-28 RX ORDER — HYOSCYAMINE SULFATE 0.12 MG/ML
0.12 LIQUID ORAL EVERY 6 HOURS PRN
Status: DISCONTINUED | OUTPATIENT
Start: 2025-01-28 | End: 2025-01-28

## 2025-01-28 RX ORDER — NALOXONE HCL 0.4 MG/ML
0.02 VIAL (ML) INJECTION
Status: DISCONTINUED | OUTPATIENT
Start: 2025-01-28 | End: 2025-01-31 | Stop reason: HOSPADM

## 2025-01-28 RX ORDER — ONDANSETRON HYDROCHLORIDE 2 MG/ML
4 INJECTION, SOLUTION INTRAVENOUS
Status: COMPLETED | OUTPATIENT
Start: 2025-01-28 | End: 2025-01-28

## 2025-01-28 RX ORDER — LANOLIN ALCOHOL/MO/W.PET/CERES
400 CREAM (GRAM) TOPICAL 2 TIMES DAILY
Status: DISCONTINUED | OUTPATIENT
Start: 2025-01-28 | End: 2025-01-28

## 2025-01-28 RX ORDER — LORAZEPAM 0.5 MG/1
0.5 TABLET ORAL EVERY 6 HOURS PRN
Status: DISCONTINUED | OUTPATIENT
Start: 2025-01-28 | End: 2025-01-31 | Stop reason: HOSPADM

## 2025-01-28 RX ORDER — ESCITALOPRAM OXALATE 10 MG/1
20 TABLET ORAL DAILY
Status: DISCONTINUED | OUTPATIENT
Start: 2025-01-29 | End: 2025-01-31 | Stop reason: HOSPADM

## 2025-01-28 RX ORDER — METHYLPREDNISOLONE SOD SUCC 125 MG
125 VIAL (EA) INJECTION
Status: COMPLETED | OUTPATIENT
Start: 2025-01-28 | End: 2025-01-28

## 2025-01-28 RX ORDER — ZOLPIDEM TARTRATE 5 MG/1
10 TABLET ORAL NIGHTLY PRN
Status: DISCONTINUED | OUTPATIENT
Start: 2025-01-28 | End: 2025-01-31 | Stop reason: HOSPADM

## 2025-01-28 RX ORDER — SODIUM CHLORIDE 9 MG/ML
INJECTION, SOLUTION INTRAVENOUS CONTINUOUS
Status: DISCONTINUED | OUTPATIENT
Start: 2025-01-28 | End: 2025-01-31 | Stop reason: HOSPADM

## 2025-01-28 RX ORDER — SODIUM CHLORIDE 9 MG/ML
1000 INJECTION, SOLUTION INTRAVENOUS
Status: COMPLETED | OUTPATIENT
Start: 2025-01-28 | End: 2025-01-28

## 2025-01-28 RX ORDER — METOPROLOL SUCCINATE 50 MG/1
50 TABLET, EXTENDED RELEASE ORAL DAILY
Status: DISCONTINUED | OUTPATIENT
Start: 2025-01-29 | End: 2025-01-31 | Stop reason: HOSPADM

## 2025-01-28 RX ORDER — ATORVASTATIN CALCIUM 10 MG/1
10 TABLET, FILM COATED ORAL NIGHTLY
Status: DISCONTINUED | OUTPATIENT
Start: 2025-01-28 | End: 2025-01-31 | Stop reason: HOSPADM

## 2025-01-28 RX ORDER — OXYCODONE AND ACETAMINOPHEN 10; 325 MG/1; MG/1
1 TABLET ORAL EVERY 4 HOURS PRN
Status: DISCONTINUED | OUTPATIENT
Start: 2025-01-28 | End: 2025-01-31 | Stop reason: HOSPADM

## 2025-01-28 RX ORDER — METRONIDAZOLE 250 MG/1
500 TABLET ORAL EVERY 8 HOURS
Status: DISCONTINUED | OUTPATIENT
Start: 2025-01-28 | End: 2025-01-31 | Stop reason: HOSPADM

## 2025-01-28 RX ORDER — MORPHINE SULFATE 4 MG/ML
4 INJECTION, SOLUTION INTRAMUSCULAR; INTRAVENOUS
Status: COMPLETED | OUTPATIENT
Start: 2025-01-28 | End: 2025-01-28

## 2025-01-28 RX ORDER — LANOLIN ALCOHOL/MO/W.PET/CERES
800 CREAM (GRAM) TOPICAL
Status: DISCONTINUED | OUTPATIENT
Start: 2025-01-28 | End: 2025-01-31 | Stop reason: HOSPADM

## 2025-01-28 RX ORDER — HYOSCYAMINE SULFATE 0.12 MG/1
0.12 TABLET SUBLINGUAL EVERY 6 HOURS PRN
Status: DISCONTINUED | OUTPATIENT
Start: 2025-01-28 | End: 2025-01-31 | Stop reason: HOSPADM

## 2025-01-28 RX ORDER — PANTOPRAZOLE SODIUM 40 MG/1
40 TABLET, DELAYED RELEASE ORAL 2 TIMES DAILY
Status: DISCONTINUED | OUTPATIENT
Start: 2025-01-28 | End: 2025-01-28

## 2025-01-28 RX ORDER — ONDANSETRON HYDROCHLORIDE 2 MG/ML
4 INJECTION, SOLUTION INTRAVENOUS EVERY 6 HOURS PRN
Status: DISCONTINUED | OUTPATIENT
Start: 2025-01-28 | End: 2025-01-31 | Stop reason: HOSPADM

## 2025-01-28 RX ORDER — ACETAMINOPHEN 325 MG/1
650 TABLET ORAL EVERY 4 HOURS PRN
Status: DISCONTINUED | OUTPATIENT
Start: 2025-01-28 | End: 2025-01-31 | Stop reason: HOSPADM

## 2025-01-28 RX ORDER — ALUMINUM HYDROXIDE, MAGNESIUM HYDROXIDE, AND SIMETHICONE 1200; 120; 1200 MG/30ML; MG/30ML; MG/30ML
30 SUSPENSION ORAL 4 TIMES DAILY PRN
Status: DISCONTINUED | OUTPATIENT
Start: 2025-01-28 | End: 2025-01-31 | Stop reason: HOSPADM

## 2025-01-28 RX ORDER — IBUPROFEN 200 MG
16 TABLET ORAL
Status: DISCONTINUED | OUTPATIENT
Start: 2025-01-28 | End: 2025-01-31 | Stop reason: HOSPADM

## 2025-01-28 RX ORDER — BUSPIRONE HYDROCHLORIDE 5 MG/1
15 TABLET ORAL 3 TIMES DAILY
Status: DISCONTINUED | OUTPATIENT
Start: 2025-01-28 | End: 2025-01-31 | Stop reason: HOSPADM

## 2025-01-28 RX ORDER — IBUPROFEN 200 MG
24 TABLET ORAL
Status: DISCONTINUED | OUTPATIENT
Start: 2025-01-28 | End: 2025-01-31 | Stop reason: HOSPADM

## 2025-01-28 RX ORDER — OXYCODONE AND ACETAMINOPHEN 5; 325 MG/1; MG/1
1 TABLET ORAL EVERY 4 HOURS PRN
Status: DISCONTINUED | OUTPATIENT
Start: 2025-01-28 | End: 2025-01-31 | Stop reason: HOSPADM

## 2025-01-28 RX ORDER — SODIUM CHLORIDE 0.9 % (FLUSH) 0.9 %
2 SYRINGE (ML) INJECTION
Status: DISCONTINUED | OUTPATIENT
Start: 2025-01-28 | End: 2025-01-31 | Stop reason: HOSPADM

## 2025-01-28 RX ORDER — PROMETHAZINE HYDROCHLORIDE 25 MG/1
25 TABLET ORAL 4 TIMES DAILY PRN
Status: DISCONTINUED | OUTPATIENT
Start: 2025-01-28 | End: 2025-01-31 | Stop reason: HOSPADM

## 2025-01-28 RX ADMIN — COLCHICINE 0.6 MG: 0.6 TABLET ORAL at 11:01

## 2025-01-28 RX ADMIN — IOHEXOL 100 ML: 350 INJECTION, SOLUTION INTRAVENOUS at 01:01

## 2025-01-28 RX ADMIN — OXYCODONE HYDROCHLORIDE AND ACETAMINOPHEN 1 TABLET: 10; 325 TABLET ORAL at 08:01

## 2025-01-28 RX ADMIN — BUSPIRONE HYDROCHLORIDE 15 MG: 5 TABLET ORAL at 10:01

## 2025-01-28 RX ADMIN — ONDANSETRON 4 MG: 2 INJECTION INTRAMUSCULAR; INTRAVENOUS at 06:01

## 2025-01-28 RX ADMIN — METHYLPREDNISOLONE SODIUM SUCCINATE 125 MG: 125 INJECTION, POWDER, FOR SOLUTION INTRAMUSCULAR; INTRAVENOUS at 04:01

## 2025-01-28 RX ADMIN — OXYCODONE HYDROCHLORIDE AND ACETAMINOPHEN 1 TABLET: 5; 325 TABLET ORAL at 03:01

## 2025-01-28 RX ADMIN — OXYCODONE HYDROCHLORIDE AND ACETAMINOPHEN 1 TABLET: 5; 325 TABLET ORAL at 11:01

## 2025-01-28 RX ADMIN — HYOSCYAMINE SULFATE 0.12 MG: 0.12 TABLET SUBLINGUAL at 10:01

## 2025-01-28 RX ADMIN — ATORVASTATIN CALCIUM 10 MG: 10 TABLET, FILM COATED ORAL at 10:01

## 2025-01-28 RX ADMIN — MORPHINE SULFATE 4 MG: 4 INJECTION, SOLUTION INTRAMUSCULAR; INTRAVENOUS at 04:01

## 2025-01-28 RX ADMIN — METRONIDAZOLE 500 MG: 250 TABLET ORAL at 10:01

## 2025-01-28 RX ADMIN — PIPERACILLIN AND TAZOBACTAM 4.5 G: 4; .5 INJECTION, POWDER, LYOPHILIZED, FOR SOLUTION INTRAVENOUS; PARENTERAL at 03:01

## 2025-01-28 RX ADMIN — ZOLPIDEM TARTRATE 10 MG: 5 TABLET, COATED ORAL at 11:01

## 2025-01-28 RX ADMIN — ONDANSETRON 4 MG: 2 INJECTION INTRAMUSCULAR; INTRAVENOUS at 11:01

## 2025-01-28 RX ADMIN — SODIUM CHLORIDE: 9 INJECTION, SOLUTION INTRAVENOUS at 10:01

## 2025-01-28 RX ADMIN — SODIUM CHLORIDE 1000 ML: 9 INJECTION, SOLUTION INTRAVENOUS at 08:01

## 2025-01-28 RX ADMIN — SODIUM CHLORIDE 1000 ML: 9 INJECTION, SOLUTION INTRAVENOUS at 11:01

## 2025-01-28 RX ADMIN — PROMETHAZINE HYDROCHLORIDE 25 MG: 25 TABLET ORAL at 10:01

## 2025-01-28 NOTE — HPI
49 y/o male with pMHx of lymphedema, hypertension, cavitary pneumonia, and ulcerative colitis h/o fecal transplant who presented to the ED with complaints of 2 days of progressively worsening rectal bleeding and vomiting.  Patient reports that he basically lives with the diffuse left lower quadrant pain however since his discharge he has had a decrease in appetite.  Two days ago the patient began with vomiting and worsening rectal bleeding.  ED MD spoke with GI who does not recommend patient receiving any antibiotics at this time due to his history of C difficile colitis and fecal transplant.  On review of GI note patient with an appointment with Jessica Church MD on February 1st.    Triage vitals with pulse 83, /80, temp 98.7°, SpO2 99%.  Blood work performed in the ED with WBC 16.16, hemoglobin 10.7, hematocrit 34.8, alk phos 49, lactate 2.3.  Chest x-ray with no acute pulmonary process.  CTA with findings compatible of acute colitis while degree of wall thickening and surrounding edema is similar to January the extent of involvement has increased slightly now with mild wall thickening of the distal transverse colon and splenic flexure.  No evidence of perforation or pneumatosis.  No contrast extravasation to indicate a site of active gastrointestinal hemorrhage.

## 2025-01-28 NOTE — ED PROVIDER NOTES
Encounter Date: 1/28/2025       History     Chief Complaint   Patient presents with    Rectal Bleeding     HPI    Jacoby Jean-Baptiste is a 50 y.o. male with a past medical history of ulcerative colitis with a previous diagnosis of C diff that presents emergency department for rectal bleeding.  Patient has also complained of left lower quadrant abdominal pain.  States that he always has pain, but the pain and bloody stools have gotten worse in the past 3-4 days.  He follows outpatient with Dr. Jean.  He currently gets Tremfya infusions for his ulcerative colitis but he is concerned that these are ineffective.  He states that he currently is being evaluated by colorectal surgery for possible subtotal colectomy.  Endorse nausea, so he came into emergency department for further evaluation.  Denies chest pain, fever, urinary symptoms, shortness of breath, numbness, weakness, and constipation    Review of patient's allergies indicates:  No Known Allergies  Past Medical History:   Diagnosis Date    C. difficile colitis     Cavitary pneumonia 11/2023    pos aspergillus serology    Diabetes mellitus 01/2022    Hyperlipidemia 2015    Hypertension 2015    Insomnia 2015    Ulcerative colitis      Past Surgical History:   Procedure Laterality Date    BRONCHOSCOPY WITH FLUOROSCOPY Left 11/20/2023    Procedure: BRONCHOSCOPY, WITH FLUOROSCOPY;  Surgeon: Lisa Villarreal MD;  Location: [facility];  Service: Pulmonary;  Laterality: Left;    BRONCHOSCOPY WITH FLUOROSCOPY N/A 11/30/2023    Procedure: BRONCHOSCOPY, WITH FLUOROSCOPY;  Surgeon: Lisa Villarreal MD;  Location: [facility];  Service: Pulmonary;  Laterality: N/A;    CARDIAC ELECTROPHYSIOLOGY MAPPING AND ABLATION  2017    SVT    CHOLECYSTECTOMY  2011    COLONOSCOPY N/A 5/3/2022    Procedure: COLONOSCOPY;  Surgeon: Shan Jean III, MD;  Location: [facility];  Service: Endoscopy;  Laterality: N/A;    COLONOSCOPY N/A 3/16/2024    Procedure: COLONOSCOPY;  Surgeon:  ALEKSANDER Ramos MD;  Location: [facility];  Service: Endoscopy;  Laterality: N/A;    COLONOSCOPY N/A 1/17/2025    Procedure: COLONOSCOPY;  Surgeon: Russ Rowe MD;  Location: [facility];  Service: Endoscopy;  Laterality: N/A;    COLONOSCOPY WITH FECAL MICROBIOTA TRANSFER N/A 3/21/2023    Procedure: COLONOSCOPY, WITH FECAL MICROBIOTA TRANSFER;  Surgeon: David Millan MD;  Location: [facility];  Service: Endoscopy;  Laterality: N/A;    ESOPHAGOGASTRODUODENOSCOPY N/A 4/24/2023    Procedure: EGD (ESOPHAGOGASTRODUODENOSCOPY);  Surgeon: Shan Jean III, MD;  Location: [facility];  Service: Endoscopy;  Laterality: N/A;    ESOPHAGOGASTRODUODENOSCOPY N/A 6/5/2024    Procedure: EGD (ESOPHAGOGASTRODUODENOSCOPY);  Surgeon: Odalis Mccabe MD;  Location: [facility];  Service: Endoscopy;  Laterality: N/A;    FLEXIBLE SIGMOIDOSCOPY N/A 6/5/2024    Procedure: SIGMOIDOSCOPY, FLEXIBLE;  Surgeon: Odalis Mccabe MD;  Location: [facility];  Service: Endoscopy;  Laterality: N/A;    FLEXIBLE SIGMOIDOSCOPY N/A 7/16/2024    Procedure: SIGMOIDOSCOPY, FLEXIBLE;  Surgeon: Shan Jean III, MD;  Location: [facility];  Service: Endoscopy;  Laterality: N/A;    FLEXIBLE SIGMOIDOSCOPY N/A 8/13/2024    Procedure: SIGMOIDOSCOPY, FLEXIBLE;  Surgeon: Shan Jean III, MD;  Location: [facility];  Service: Endoscopy;  Laterality: N/A;    GANGLION CYST EXCISION Left 1992    UMBILICAL HERNIA REPAIR  2011    with mesh     Family History   Problem Relation Name Age of Onset    Asthma Mother       Social History     Tobacco Use    Smoking status: Former     Average packs/day: 1 pack/day for 30.0 years (30.0 ttl pk-yrs)     Types: Cigarettes     Start date: 1993    Smokeless tobacco: Never    Tobacco comments:     Pt states he has stopped smoking with Chantix three weeks ago. 12/1/23   Substance Use Topics    Alcohol use: Yes     Comment: ocass    Drug use: Not Currently     Review of Systems   Constitutional:   Negative for fever.   HENT:  Negative for sore throat.    Respiratory:  Negative for cough and shortness of breath.    Cardiovascular:  Negative for chest pain and leg swelling.   Gastrointestinal:  Positive for abdominal pain and blood in stool. Negative for constipation, diarrhea, nausea and vomiting.   Genitourinary:  Negative for dysuria, frequency and hematuria.   Musculoskeletal:  Negative for back pain.   Skin:  Negative for rash.   Neurological:  Negative for dizziness, weakness, numbness and headaches.   Hematological:  Does not bruise/bleed easily.       Physical Exam     Initial Vitals [01/28/25 0929]   BP Pulse Resp Temp SpO2   133/80 83 20 98.7 °F (37.1 °C) 99 %      MAP       --         Physical Exam    Nursing note and vitals reviewed.  Constitutional: He appears well-developed and well-nourished.   HENT:   Head: Normocephalic and atraumatic.   Eyes: EOM are normal. Pupils are equal, round, and reactive to light.   Neck:   Normal range of motion.  Cardiovascular:  Normal rate and regular rhythm.           Pulmonary/Chest: Breath sounds normal. No respiratory distress.   Abdominal: Abdomen is soft. He exhibits no distension. There is abdominal tenderness (Left lower quadrant). There is no rebound.   Genitourinary: Rectum:      Guaiac result positive.   Guaiac positive stool. : Acceptable.   Genitourinary Comments: No gross blood on rectal     Musculoskeletal:         General: Normal range of motion.      Cervical back: Normal range of motion.     Neurological: He is alert and oriented to person, place, and time. He has normal strength. No cranial nerve deficit or sensory deficit. GCS score is 15. GCS eye subscore is 4. GCS verbal subscore is 5. GCS motor subscore is 6.   Skin: Capillary refill takes less than 2 seconds.   Psychiatric: He has a normal mood and affect.         ED Course   Procedures  Labs Reviewed   CBC W/ AUTO DIFFERENTIAL - Abnormal       Result Value    WBC 16.16 (*)      RBC 4.00 (*)     Hemoglobin 10.7 (*)     Hematocrit 34.8 (*)     MCV 87      MCH 26.8 (*)     MCHC 30.7 (*)     RDW 19.3 (*)     Platelets 262      MPV 10.3      Immature Granulocytes 0.7 (*)     Gran # (ANC) 12.6 (*)     Immature Grans (Abs) 0.11 (*)     Lymph # 1.5      Mono # 1.3 (*)     Eos # 0.6 (*)     Baso # 0.09      nRBC 0      Gran % 77.9 (*)     Lymph % 9.2 (*)     Mono % 8.2      Eosinophil % 3.4      Basophil % 0.6      Differential Method Automated     COMPREHENSIVE METABOLIC PANEL - Abnormal    Sodium 138      Potassium 4.5      Chloride 105      CO2 27      Glucose 94      BUN 17      Creatinine 1.1      Calcium 9.0      Total Protein 6.8      Albumin 3.7      Total Bilirubin 0.4      Alkaline Phosphatase 49 (*)     AST 17      ALT 15      eGFR >60.0      Anion Gap 6 (*)    OCCULT BLOOD X 1, STOOL - Abnormal    Occult Blood Positive (*)    ISTAT LACTATE - Abnormal    POC Lactate 2.36 (*)     Sample VENOUS     CULTURE, BLOOD    Narrative:     Collection has been rescheduled by [email or web address] at 01/28/2025 14:31 Reason:   Patient unavailable  Collection has been rescheduled by ZJ1 at 01/28/2025 14:31 Reason:   Patient unavailable   CULTURE, BLOOD    Narrative:     Collection has been rescheduled by [email or web address] at 01/28/2025 14:31 Reason:   Patient unavailable  Collection has been rescheduled by [email or web address] at 01/28/2025 14:31 Reason:   Patient unavailable   CLOSTRIDIUM DIFFICILE   URINALYSIS, REFLEX TO URINE CULTURE   LACTIC ACID, PLASMA   CALPROTECTIN, STOOL   TYPE & SCREEN    Group & Rh A POS      Indirect Stephen NEG      Specimen Outdate 01/31/2025 23:59     POCT LACTATE        ECG Results              EKG 12-lead (In process)        Collection Time Result Time QRS Duration OHS QTC Calculation    01/28/25 14:31:19 01/28/25 14:40:16 72 436                     In process by Interface, Lab In [facility] (01/28/25 14:40:23)                   Narrative:    Test Reason : K92.2,    Vent. Rate :  73 BPM     Atrial Rate :  73 BPM      P-R Int : 144 ms          QRS Dur :  72 ms      QT Int : 396 ms       P-R-T Axes :  51  23  27 degrees    QTcB Int : 436 ms    Normal sinus rhythm  Normal ECG  No previous ECGs available    Referred By: AAAREFERRAL SELF           Confirmed By:                                   Imaging Results              X-Ray Chest PA And Lateral (Final result)  Result time 01/28/25 14:36:45   Procedure changed from X-Ray Chest AP Portable     Final result by Consuelo Shea MD (01/28/25 14:36:45)                   Impression:      No acute pulmonary process      Electronically signed by: Consuelo Shea  Date:    01/28/2025  Time:    14:36               Narrative:    EXAMINATION:  XR CHEST PA AND LATERAL    CLINICAL HISTORY:  pain;Sepsis;    FINDINGS:  PA and lateral chest is compared to 01/16/2025 shows normal cardiomediastinal silhouette.    Lungs are clear. Pulmonary vasculature is normal. No acute osseous abnormality.                                       CTA Acute GI Bleed, Abdomen and Pelvis (Final result)  Result time 01/28/25 13:52:23   Procedure changed from CT Abdomen Pelvis With IV Contrast NO Oral Contrast     Final result by Samuel Boyle MD (01/28/25 13:52:23)                   Impression:      1. CT findings compatible with acute colitis.  While the degree of wall thickening and surrounding edema is similar to January 16, the extent of involvement has increased slightly, now with mild wall thickening of the distal transverse colon and splenic flexure.  No evidence of perforation or pneumatosis.  No contrast extravasation to indicate a site of active gastrointestinal hemorrhage.  2. Additional observations as above.      Electronically signed by: Samuel Boyle  Date:    01/28/2025  Time:    13:52               Narrative:    EXAMINATION:  CTA ACUTE GI BLEED, ABDOMEN AND PELVIS    CLINICAL HISTORY:  GI bleed;GI BLEED; history of ulcerative colitis.    COMPARISON:  January 16,  2025    FINDINGS:  Multiphase pre and post infusion imaging was performed.  CT angiographic protocol was utilized.  100 cc nonionic contrast was administered for the exam.    The lung bases are unremarkable.    The liver has a normal appearance.  The gallbladder is absent.  The biliary tree is nondilated.  The spleen, pancreas, and adrenal glands are normal.  Bilateral nonobstructing renal calculi are noted, the largest at the lower pole on the left measuring 7 mm.  There is no hydronephrosis.  The abdominal aorta is normal in caliber with mild atherosclerotic calcification.  There is moderate calcification at the origin of the superior mesenteric artery, with at least mild luminal diameter narrowing.    Evaluation of bowel structures reveals abnormal circumferential wall thickening of the colon, with involvement of the distal transverse colon, splenic flexure, descending colon, and rectosigmoid colon.  Wall thickening of the distal transverse colon and splenic flexure is new compared to January 16.  The degree of wall thickening of the descending colon, sigmoid colon, and rectum is unchanged, with stable surrounding edema.  There is no evidence of perforation or pneumatosis.  No contrast extravasation is identified to indicate a site of active gastrointestinal hemorrhage.  There is no free air or free fluid.    Images of the pelvis demonstrate a normal appearing urinary bladder.  There is no free fluid or lymphadenopathy.  Rim calcified nodular density is noted anteriorly near midline within the peritoneal cavity, of no current significance.    No acute osseous abnormalities are identified.                                       Medications   sodium chloride 0.9% bolus 1,000 mL 1,000 mL (has no administration in time range)   sodium chloride 0.9% flush 2 mL (has no administration in time range)   melatonin tablet 6 mg (has no administration in time range)   ondansetron injection 4 mg (4 mg Intravenous Given 1/28/25  1826)   aluminum-magnesium hydroxide-simethicone 200-200-20 mg/5 mL suspension 30 mL (has no administration in time range)   acetaminophen tablet 650 mg (has no administration in time range)   naloxone 0.4 mg/mL injection 0.02 mg (has no administration in time range)   potassium bicarbonate disintegrating tablet 50 mEq (has no administration in time range)   potassium bicarbonate disintegrating tablet 35 mEq (has no administration in time range)   potassium bicarbonate disintegrating tablet 60 mEq (has no administration in time range)   magnesium oxide tablet 800 mg (has no administration in time range)   magnesium oxide tablet 800 mg (has no administration in time range)   potassium, sodium phosphates 280-160-250 mg packet 2 packet (has no administration in time range)   potassium, sodium phosphates 280-160-250 mg packet 2 packet (has no administration in time range)   potassium, sodium phosphates 280-160-250 mg packet 2 packet (has no administration in time range)   glucose chewable tablet 16 g (has no administration in time range)   glucose chewable tablet 24 g (has no administration in time range)   dextrose 50% injection 12.5 g (has no administration in time range)   dextrose 50% injection 25 g (has no administration in time range)   glucagon (human recombinant) injection 1 mg (has no administration in time range)   oxyCODONE-acetaminophen  mg per tablet 1 tablet (has no administration in time range)   oxyCODONE-acetaminophen 5-325 mg per tablet 1 tablet (has no administration in time range)   ondansetron injection 4 mg (4 mg Intravenous Given 1/28/25 1116)   0.9% NaCl infusion (0 mLs Intravenous Stopped 1/28/25 1409)   oxyCODONE-acetaminophen 5-325 mg per tablet 1 tablet (1 tablet Oral Given 1/28/25 1117)   iohexoL (OMNIPAQUE 350) injection 100 mL (100 mLs Intravenous Given 1/28/25 1309)   oxyCODONE-acetaminophen 5-325 mg per tablet 1 tablet (1 tablet Oral Given 1/28/25 1515)   methylPREDNISolone sodium  succinate injection 125 mg (125 mg Intravenous Given 1/28/25 1614)   morphine injection 4 mg (4 mg Intravenous Given 1/28/25 1615)     Medical Decision Making  Jacoby Jean-Baptiste is a 50 y.o. male  with a past medical history of ulcerative colitis with a previous diagnosis of C diff that presents emergency department for rectal bleeding.  Patient has also complained of left lower quadrant abdominal pain.  Vitals were stable.  Nontoxic male.  Left lower quadrant tenderness without peritoneal signs. No gross blood on rectal.  Guaiac positive.  Presentation for ulcerative colitis flare.   CBC shows leukocytosis of 16.1.  Hemoglobin of 10.7.  Consistent with previous.  Not hemodynamically significant bleed.  Blood cultures pending.  Lactic elevated to 2.36.  Given 2 L of fluids.  Discussed case with Gastroenterology.  Given Solu-Medrol here.  CT of the abdomen is concerning for acute colitis.  No perforation.  No contrast extravasation.  Initially was going to give Zosyn, but given history of resistant C diff infection, this was discontinued.  He did get about a half a dose of Zosyn.  Admitted to Hospital Medicine for UC flare.    Amount and/or Complexity of Data Reviewed  Labs: ordered.  Radiology: ordered.    Risk  Prescription drug management.  Decision regarding hospitalization.                                      Clinical Impression:  Final diagnoses:  [K92.2] GI bleed  [K52.9] Colitis (Primary)  [Z87.19] History of ulcerative colitis  [K62.5] Rectal bleeding          ED Disposition Condition    Admit                 Huey Alba MD  01/28/25 8708

## 2025-01-28 NOTE — ASSESSMENT & PLAN NOTE
Patient given Solu-Medrol 125 mg IV x1  Decadron 18 mg daily to be weaned  Calprotectin/c-dif ordered  Resume home Levsin dose

## 2025-01-28 NOTE — SUBJECTIVE & OBJECTIVE
Past Medical History:   Diagnosis Date    C. difficile colitis     Cavitary pneumonia 11/2023    pos aspergillus serology    Diabetes mellitus 01/2022    Hyperlipidemia 2015    Hypertension 2015    Insomnia 2015    Ulcerative colitis        Past Surgical History:   Procedure Laterality Date    BRONCHOSCOPY WITH FLUOROSCOPY Left 11/20/2023    Procedure: BRONCHOSCOPY, WITH FLUOROSCOPY;  Surgeon: Lisa Villarreal MD;  Location: [facility];  Service: Pulmonary;  Laterality: Left;    BRONCHOSCOPY WITH FLUOROSCOPY N/A 11/30/2023    Procedure: BRONCHOSCOPY, WITH FLUOROSCOPY;  Surgeon: Lisa Villarreal MD;  Location: [facility];  Service: Pulmonary;  Laterality: N/A;    CARDIAC ELECTROPHYSIOLOGY MAPPING AND ABLATION  2017    SVT    CHOLECYSTECTOMY  2011    COLONOSCOPY N/A 5/3/2022    Procedure: COLONOSCOPY;  Surgeon: Shan Jean III, MD;  Location: [facility];  Service: Endoscopy;  Laterality: N/A;    COLONOSCOPY N/A 3/16/2024    Procedure: COLONOSCOPY;  Surgeon: ALEKSANDER Ramos MD;  Location: [facility];  Service: Endoscopy;  Laterality: N/A;    COLONOSCOPY N/A 1/17/2025    Procedure: COLONOSCOPY;  Surgeon: Russ Rowe MD;  Location: [facility];  Service: Endoscopy;  Laterality: N/A;    COLONOSCOPY WITH FECAL MICROBIOTA TRANSFER N/A 3/21/2023    Procedure: COLONOSCOPY, WITH FECAL MICROBIOTA TRANSFER;  Surgeon: David Millan MD;  Location: [facility];  Service: Endoscopy;  Laterality: N/A;    ESOPHAGOGASTRODUODENOSCOPY N/A 4/24/2023    Procedure: EGD (ESOPHAGOGASTRODUODENOSCOPY);  Surgeon: Shan Jean III, MD;  Location: [facility];  Service: Endoscopy;  Laterality: N/A;    ESOPHAGOGASTRODUODENOSCOPY N/A 6/5/2024    Procedure: EGD (ESOPHAGOGASTRODUODENOSCOPY);  Surgeon: Odalis Mccabe MD;  Location: [facility];  Service: Endoscopy;  Laterality: N/A;    FLEXIBLE SIGMOIDOSCOPY N/A 6/5/2024    Procedure: SIGMOIDOSCOPY, FLEXIBLE;  Surgeon: Odalis Mccabe MD;  Location: [facility];  Service:  Endoscopy;  Laterality: N/A;    FLEXIBLE SIGMOIDOSCOPY N/A 7/16/2024    Procedure: SIGMOIDOSCOPY, FLEXIBLE;  Surgeon: Shan Jean III, MD;  Location: [facility];  Service: Endoscopy;  Laterality: N/A;    FLEXIBLE SIGMOIDOSCOPY N/A 8/13/2024    Procedure: SIGMOIDOSCOPY, FLEXIBLE;  Surgeon: Shan Jean III, MD;  Location: [facility];  Service: Endoscopy;  Laterality: N/A;    GANGLION CYST EXCISION Left 1992    UMBILICAL HERNIA REPAIR  2011    with mesh       Review of patient's allergies indicates:  No Known Allergies    Current Facility-Administered Medications on File Prior to Encounter   Medication    lactated ringers infusion     Current Outpatient Medications on File Prior to Encounter   Medication Sig    budesonide (ENTOCORT EC) 3 mg capsule Take 3 capsules (9 mg total) by mouth once daily.    busPIRone (BUSPAR) 15 MG tablet Take 1 tablet (15 mg total) by mouth 3 (three) times daily.    colchicine (COLCRYS) 0.6 mg tablet Take 1 tablet (0.6 mg total) by mouth 2 (two) times daily.    EScitalopram oxalate (LEXAPRO) 20 MG tablet Take 1 tablet (20 mg total) by mouth once daily.    hyoscyamine (LEVBID) 0.375 mg Tb12 Take 0.375 mg by mouth 2 (two) times daily.    Lactobac 2-Bifido 1-S. therm (VSL#3) 112.5 billion cell Cap Take 1 capsule by mouth twice a day    LORazepam (ATIVAN) 0.5 MG tablet Take 1 tablet (0.5 mg total) by mouth every 6 (six) hours as needed for Anxiety.    magnesium oxide (MAG-OX) 400 mg (241.3 mg magnesium) tablet Take 1 tablet (400 mg total) by mouth 2 (two) times daily.    metoprolol succinate (TOPROL-XL) 50 MG 24 hr tablet Take 1 tablet (50 mg total) by mouth once daily.    pantoprazole (PROTONIX) 40 MG tablet Take 40 mg by mouth 2 (two) times daily.    promethazine (PHENERGAN) 25 MG tablet Take 25 mg by mouth 4 (four) times daily as needed for Nausea.    rivaroxaban (XARELTO) 20 mg Tab Take 1 tablet (20 mg total) by mouth daily with dinner or evening meal.    simvastatin (ZOCOR)  20 MG tablet Take 1 tablet (20 mg total) by mouth every evening.    zolpidem (AMBIEN) 10 mg Tab Take 1 tablet (10 mg total) by mouth nightly as needed (insomnia).    bezlotoxumab (ZINPLAVA) 25 mg/mL Soln injection Inject 10 mg/kg intravenously single dose, during antibacterial treatment (Patient not taking: Reported on 1/28/2025)    fidaxomicin (DIFICID) 200 mg Tab Take 1 tablet (200 mg total) by mouth 2 (two) times daily. for 10 days (Patient not taking: Reported on 1/28/2025)     Family History       Problem Relation (Age of Onset)    Asthma Mother          Tobacco Use    Smoking status: Former     Average packs/day: 1 pack/day for 30.0 years (30.0 ttl pk-yrs)     Types: Cigarettes     Start date: 1993    Smokeless tobacco: Never    Tobacco comments:     Pt states he has stopped smoking with Chantix three weeks ago. 12/1/23   Substance and Sexual Activity    Alcohol use: Yes     Comment: ocass    Drug use: Not Currently    Sexual activity: Not Currently     Review of Systems   Constitutional:  Positive for appetite change.   Gastrointestinal:  Positive for abdominal pain, anal bleeding, blood in stool and nausea.     Objective:     Vital Signs (Most Recent):  Temp: 98.7 °F (37.1 °C) (01/28/25 0929)  Pulse: 83 (01/28/25 0929)  Resp: 18 (01/28/25 1615)  BP: 133/80 (01/28/25 0929)  SpO2: 99 % (01/28/25 0929) Vital Signs (24h Range):  Temp:  [98.7 °F (37.1 °C)] 98.7 °F (37.1 °C)  Pulse:  [83] 83  Resp:  [16-20] 18  SpO2:  [99 %] 99 %  BP: (133)/(80) 133/80     Weight: 131.5 kg (290 lb)  Body mass index is 48.26 kg/m².     Physical Exam  Vitals reviewed.   Constitutional:       Appearance: Normal appearance.   HENT:      Head: Normocephalic and atraumatic.      Nose: No congestion.      Mouth/Throat:      Mouth: Mucous membranes are moist.      Pharynx: Oropharynx is clear.   Eyes:      Extraocular Movements: Extraocular movements intact.      Pupils: Pupils are equal, round, and reactive to light.   Cardiovascular:       Rate and Rhythm: Normal rate and regular rhythm.      Pulses: Normal pulses.      Heart sounds: Normal heart sounds.   Pulmonary:      Effort: Pulmonary effort is normal.      Breath sounds: Normal breath sounds.   Abdominal:      General: Bowel sounds are normal.      Palpations: Abdomen is soft.   Musculoskeletal:         General: Normal range of motion.      Cervical back: Normal range of motion and neck supple.   Skin:     General: Skin is warm and dry.      Capillary Refill: Capillary refill takes less than 2 seconds.   Neurological:      General: No focal deficit present.      Mental Status: He is alert and oriented to person, place, and time. Mental status is at baseline.   Psychiatric:         Mood and Affect: Mood normal.         Behavior: Behavior normal.         Judgment: Judgment normal.              CRANIAL NERVES     CN III, IV, VI   Pupils are equal, round, and reactive to light.       Significant Labs: All pertinent labs within the past 24 hours have been reviewed.  Recent Lab Results         01/28/25  1523   01/28/25  1112   01/28/25  1101        Albumin     3.7       ALP     49       ALT     15       Anion Gap     6       AST     17       Baso #     0.09       Basophil %     0.6       BILIRUBIN TOTAL     0.4  Comment: For infants and newborns, interpretation of results should be based  on gestational age, weight and in agreement with clinical  observations.    Premature Infant recommended reference ranges:  Up to 24 hours.............<8.0 mg/dL  Up to 48 hours............<12.0 mg/dL  3-5 days..................<15.0 mg/dL  6-29 days.................<15.0 mg/dL         BUN     17       Calcium     9.0       Chloride     105       CO2     27       Creatinine     1.1       Differential Method     Automated       eGFR     >60.0       Eos #     0.6       Eos %     3.4       Glucose     94       Gran # (ANC)     12.6       Gran %     77.9       Group & Rh     A POS       Hematocrit     34.8        Hemoglobin     10.7       Immature Grans (Abs)     0.11  Comment: Mild elevation in immature granulocytes is non specific and   can be seen in a variety of conditions including stress response,   acute inflammation, trauma and pregnancy. Correlation with other   laboratory and clinical findings is essential.         Immature Granulocytes     0.7       INDIRECT FLACO     NEG       Lymph #     1.5       Lymph %     9.2       MCH     26.8       MCHC     30.7       MCV     87       Mono #     1.3       Mono %     8.2       MPV     10.3       nRBC     0       Occult Blood   Positive         Platelet Count     262       POC Lactate 2.36           Potassium     4.5       PROTEIN TOTAL     6.8       RBC     4.00       RDW     19.3       Sample VENOUS           Sodium     138       Specimen Outdate     01/31/2025 23:59       WBC     16.16               Significant Imaging: I have reviewed all pertinent imaging results/findings within the past 24 hours.    X-Ray Chest PA And Lateral  Narrative: EXAMINATION:  XR CHEST PA AND LATERAL    CLINICAL HISTORY:  pain;Sepsis;    FINDINGS:  PA and lateral chest is compared to 01/16/2025 shows normal cardiomediastinal silhouette.    Lungs are clear. Pulmonary vasculature is normal. No acute osseous abnormality.  Impression: No acute pulmonary process    Electronically signed by: Consuelo Shea  Date:    01/28/2025  Time:    14:36  CTA Acute GI Bleed, Abdomen and Pelvis  Narrative: EXAMINATION:  CTA ACUTE GI BLEED, ABDOMEN AND PELVIS    CLINICAL HISTORY:  GI bleed;GI BLEED; history of ulcerative colitis.    COMPARISON:  January 16, 2025    FINDINGS:  Multiphase pre and post infusion imaging was performed.  CT angiographic protocol was utilized.  100 cc nonionic contrast was administered for the exam.    The lung bases are unremarkable.    The liver has a normal appearance.  The gallbladder is absent.  The biliary tree is nondilated.  The spleen, pancreas, and adrenal glands are normal.   Bilateral nonobstructing renal calculi are noted, the largest at the lower pole on the left measuring 7 mm.  There is no hydronephrosis.  The abdominal aorta is normal in caliber with mild atherosclerotic calcification.  There is moderate calcification at the origin of the superior mesenteric artery, with at least mild luminal diameter narrowing.    Evaluation of bowel structures reveals abnormal circumferential wall thickening of the colon, with involvement of the distal transverse colon, splenic flexure, descending colon, and rectosigmoid colon.  Wall thickening of the distal transverse colon and splenic flexure is new compared to January 16.  The degree of wall thickening of the descending colon, sigmoid colon, and rectum is unchanged, with stable surrounding edema.  There is no evidence of perforation or pneumatosis.  No contrast extravasation is identified to indicate a site of active gastrointestinal hemorrhage.  There is no free air or free fluid.    Images of the pelvis demonstrate a normal appearing urinary bladder.  There is no free fluid or lymphadenopathy.  Rim calcified nodular density is noted anteriorly near midline within the peritoneal cavity, of no current significance.    No acute osseous abnormalities are identified.  Impression: 1. CT findings compatible with acute colitis.  While the degree of wall thickening and surrounding edema is similar to January 16, the extent of involvement has increased slightly, now with mild wall thickening of the distal transverse colon and splenic flexure.  No evidence of perforation or pneumatosis.  No contrast extravasation to indicate a site of active gastrointestinal hemorrhage.  2. Additional observations as above.    Electronically signed by: Samuel Boyle  Date:    01/28/2025  Time:    13:52

## 2025-01-28 NOTE — ASSESSMENT & PLAN NOTE
This patient does have evidence of infective focus  My overall impression is sepsis.  Source: Abdominal  Antibiotics given-   Antibiotics (72h ago, onward)      None     No antibiotics given per GI  given patient's history     Latest lactate reviewed-  Recent Labs   Lab 01/28/25  1523   POCLAC 2.36*     Organ dysfunction indicated by  elevated lactic likely related to underlying UC/in recurrent C diff    Fluid challenge Ideal Body Weight- The patient's ideal body weight is Ideal body weight: 61.5 kg (135 lb 9.3 oz) which will be used to calculate fluid bolus of 30 ml/kg for treatment of septic shock.

## 2025-01-28 NOTE — FIRST PROVIDER EVALUATION
"Medical screening examination initiated.  I have conducted a focused provider triage encounter, findings are as follows:    Brief history of present illness:  Presents with complaint blood in his stool onset 2 days ago.  This patient is well-known here in the ED has a history of ulcerative colitis renal failure chronic anemia Crohn's disease gastroparesis.    Vitals:    01/28/25 0929   BP: 133/80   BP Location: Left arm   Pulse: 83   Resp: 20   Temp: 98.7 °F (37.1 °C)   TempSrc: Oral   SpO2: 99%   Weight: 131.5 kg (290 lb)   Height: 5' 5" (1.651 m)       Pertinent physical exam:  Patient is in CC exam room unable to examine him other than heart rate regular lungs are clear to auscultation.  Abdomen is soft nondistended    Brief workup plan:  CT abdomen and pelvis. occult blood    Preliminary workup initiated; this workup will be continued and followed by the physician or advanced practice provider that is assigned to the patient when roomed.  "

## 2025-01-29 PROBLEM — N17.9 ACUTE RENAL FAILURE: Status: RESOLVED | Noted: 2024-08-09 | Resolved: 2025-01-29

## 2025-01-29 PROBLEM — L08.9 WOUND INFECTION: Status: RESOLVED | Noted: 2024-08-28 | Resolved: 2025-01-29

## 2025-01-29 PROBLEM — A04.72 C. DIFFICILE COLITIS: Status: RESOLVED | Noted: 2025-01-15 | Resolved: 2025-01-29

## 2025-01-29 PROBLEM — K52.9 COLITIS: Status: RESOLVED | Noted: 2024-12-31 | Resolved: 2025-01-29

## 2025-01-29 PROBLEM — T14.8XXA WOUND INFECTION: Status: RESOLVED | Noted: 2024-08-28 | Resolved: 2025-01-29

## 2025-01-29 PROBLEM — R07.9 CHEST PAIN: Status: RESOLVED | Noted: 2023-11-17 | Resolved: 2025-01-29

## 2025-01-29 PROBLEM — E78.5 HLD (HYPERLIPIDEMIA): Status: ACTIVE | Noted: 2025-01-29

## 2025-01-29 PROBLEM — R52 ACUTE PAIN: Status: RESOLVED | Noted: 2024-09-18 | Resolved: 2025-01-29

## 2025-01-29 PROBLEM — D72.829 LEUKOCYTOSIS: Status: RESOLVED | Noted: 2024-12-31 | Resolved: 2025-01-29

## 2025-01-29 LAB
ALBUMIN SERPL BCP-MCNC: 3.5 G/DL (ref 3.5–5.2)
ALP SERPL-CCNC: 52 U/L (ref 55–135)
ALT SERPL W/O P-5'-P-CCNC: 14 U/L (ref 10–44)
ANION GAP SERPL CALC-SCNC: 9 MMOL/L (ref 8–16)
AST SERPL-CCNC: 13 U/L (ref 10–40)
BASOPHILS # BLD AUTO: 0.01 K/UL (ref 0–0.2)
BASOPHILS NFR BLD: 0.1 % (ref 0–1.9)
BILIRUB SERPL-MCNC: 0.6 MG/DL (ref 0.1–1)
BUN SERPL-MCNC: 16 MG/DL (ref 6–20)
CALCIUM SERPL-MCNC: 8.9 MG/DL (ref 8.7–10.5)
CHLORIDE SERPL-SCNC: 103 MMOL/L (ref 95–110)
CO2 SERPL-SCNC: 21 MMOL/L (ref 23–29)
CREAT SERPL-MCNC: 1 MG/DL (ref 0.5–1.4)
DIFFERENTIAL METHOD BLD: ABNORMAL
EOSINOPHIL # BLD AUTO: 0 K/UL (ref 0–0.5)
EOSINOPHIL NFR BLD: 0 % (ref 0–8)
ERYTHROCYTE [DISTWIDTH] IN BLOOD BY AUTOMATED COUNT: 18.7 % (ref 11.5–14.5)
EST. GFR  (NO RACE VARIABLE): >60 ML/MIN/1.73 M^2
GLUCOSE SERPL-MCNC: 161 MG/DL (ref 70–110)
HCT VFR BLD AUTO: 33.1 % (ref 40–54)
HGB BLD-MCNC: 10.2 G/DL (ref 14–18)
IMM GRANULOCYTES # BLD AUTO: 0.06 K/UL (ref 0–0.04)
IMM GRANULOCYTES NFR BLD AUTO: 0.5 % (ref 0–0.5)
LYMPHOCYTES # BLD AUTO: 0.7 K/UL (ref 1–4.8)
LYMPHOCYTES NFR BLD: 6.3 % (ref 18–48)
MAGNESIUM SERPL-MCNC: 1.4 MG/DL (ref 1.6–2.6)
MCH RBC QN AUTO: 26.2 PG (ref 27–31)
MCHC RBC AUTO-ENTMCNC: 30.8 G/DL (ref 32–36)
MCV RBC AUTO: 85 FL (ref 82–98)
MONOCYTES # BLD AUTO: 0.1 K/UL (ref 0.3–1)
MONOCYTES NFR BLD: 0.9 % (ref 4–15)
NEUTROPHILS # BLD AUTO: 10.6 K/UL (ref 1.8–7.7)
NEUTROPHILS NFR BLD: 92.2 % (ref 38–73)
NRBC BLD-RTO: 0 /100 WBC
PLATELET # BLD AUTO: 229 K/UL (ref 150–450)
PMV BLD AUTO: 10.7 FL (ref 9.2–12.9)
POTASSIUM SERPL-SCNC: 4.8 MMOL/L (ref 3.5–5.1)
PROT SERPL-MCNC: 6.4 G/DL (ref 6–8.4)
RBC # BLD AUTO: 3.89 M/UL (ref 4.6–6.2)
SODIUM SERPL-SCNC: 133 MMOL/L (ref 136–145)
WBC # BLD AUTO: 11.5 K/UL (ref 3.9–12.7)

## 2025-01-29 PROCEDURE — 25000003 PHARM REV CODE 250: Performed by: NURSE PRACTITIONER

## 2025-01-29 PROCEDURE — 12000002 HC ACUTE/MED SURGE SEMI-PRIVATE ROOM

## 2025-01-29 PROCEDURE — 83735 ASSAY OF MAGNESIUM: CPT | Performed by: NURSE PRACTITIONER

## 2025-01-29 PROCEDURE — 85025 COMPLETE CBC W/AUTO DIFF WBC: CPT | Performed by: NURSE PRACTITIONER

## 2025-01-29 PROCEDURE — 63600175 PHARM REV CODE 636 W HCPCS: Performed by: STUDENT IN AN ORGANIZED HEALTH CARE EDUCATION/TRAINING PROGRAM

## 2025-01-29 PROCEDURE — 25000003 PHARM REV CODE 250: Performed by: INTERNAL MEDICINE

## 2025-01-29 PROCEDURE — 63600175 PHARM REV CODE 636 W HCPCS: Mod: JZ,TB | Performed by: NURSE PRACTITIONER

## 2025-01-29 PROCEDURE — 25000003 PHARM REV CODE 250: Performed by: STUDENT IN AN ORGANIZED HEALTH CARE EDUCATION/TRAINING PROGRAM

## 2025-01-29 PROCEDURE — 80053 COMPREHEN METABOLIC PANEL: CPT | Performed by: NURSE PRACTITIONER

## 2025-01-29 RX ORDER — MORPHINE SULFATE 4 MG/ML
4 INJECTION, SOLUTION INTRAMUSCULAR; INTRAVENOUS EVERY 4 HOURS PRN
Status: DISCONTINUED | OUTPATIENT
Start: 2025-01-29 | End: 2025-01-31 | Stop reason: HOSPADM

## 2025-01-29 RX ORDER — MAGNESIUM SULFATE HEPTAHYDRATE 40 MG/ML
2 INJECTION, SOLUTION INTRAVENOUS ONCE
Status: COMPLETED | OUTPATIENT
Start: 2025-01-29 | End: 2025-01-29

## 2025-01-29 RX ORDER — DIPHENHYDRAMINE HCL 25 MG
25 CAPSULE ORAL EVERY 6 HOURS PRN
Status: DISCONTINUED | OUTPATIENT
Start: 2025-01-29 | End: 2025-01-31 | Stop reason: HOSPADM

## 2025-01-29 RX ADMIN — SODIUM CHLORIDE: 9 INJECTION, SOLUTION INTRAVENOUS at 08:01

## 2025-01-29 RX ADMIN — Medication 1 TABLET: at 07:01

## 2025-01-29 RX ADMIN — COLCHICINE 0.6 MG: 0.6 TABLET ORAL at 09:01

## 2025-01-29 RX ADMIN — BUSPIRONE HYDROCHLORIDE 15 MG: 5 TABLET ORAL at 03:01

## 2025-01-29 RX ADMIN — ONDANSETRON 4 MG: 2 INJECTION INTRAMUSCULAR; INTRAVENOUS at 03:01

## 2025-01-29 RX ADMIN — BUSPIRONE HYDROCHLORIDE 15 MG: 5 TABLET ORAL at 09:01

## 2025-01-29 RX ADMIN — MAGNESIUM SULFATE HEPTAHYDRATE 2 G: 40 INJECTION, SOLUTION INTRAVENOUS at 11:01

## 2025-01-29 RX ADMIN — DIPHENHYDRAMINE HYDROCHLORIDE 25 MG: 25 CAPSULE ORAL at 03:01

## 2025-01-29 RX ADMIN — ESCITALOPRAM OXALATE 20 MG: 10 TABLET ORAL at 09:01

## 2025-01-29 RX ADMIN — ZOLPIDEM TARTRATE 10 MG: 5 TABLET, COATED ORAL at 10:01

## 2025-01-29 RX ADMIN — OXYCODONE HYDROCHLORIDE AND ACETAMINOPHEN 1 TABLET: 10; 325 TABLET ORAL at 10:01

## 2025-01-29 RX ADMIN — Medication 1 TABLET: at 11:01

## 2025-01-29 RX ADMIN — METRONIDAZOLE 500 MG: 250 TABLET ORAL at 01:01

## 2025-01-29 RX ADMIN — SODIUM CHLORIDE: 9 INJECTION, SOLUTION INTRAVENOUS at 09:01

## 2025-01-29 RX ADMIN — OXYCODONE HYDROCHLORIDE AND ACETAMINOPHEN 1 TABLET: 10; 325 TABLET ORAL at 07:01

## 2025-01-29 RX ADMIN — OXYCODONE HYDROCHLORIDE AND ACETAMINOPHEN 1 TABLET: 10; 325 TABLET ORAL at 04:01

## 2025-01-29 RX ADMIN — METRONIDAZOLE 500 MG: 250 TABLET ORAL at 09:01

## 2025-01-29 RX ADMIN — METRONIDAZOLE 500 MG: 250 TABLET ORAL at 07:01

## 2025-01-29 RX ADMIN — METHYLPREDNISOLONE SODIUM SUCCINATE 30 MG: 40 INJECTION, POWDER, FOR SOLUTION INTRAMUSCULAR; INTRAVENOUS at 03:01

## 2025-01-29 RX ADMIN — ONDANSETRON 4 MG: 2 INJECTION INTRAMUSCULAR; INTRAVENOUS at 09:01

## 2025-01-29 RX ADMIN — OXYCODONE HYDROCHLORIDE AND ACETAMINOPHEN 1 TABLET: 10; 325 TABLET ORAL at 02:01

## 2025-01-29 RX ADMIN — RIVAROXABAN 20 MG: 10 TABLET, FILM COATED ORAL at 04:01

## 2025-01-29 RX ADMIN — ATORVASTATIN CALCIUM 10 MG: 10 TABLET, FILM COATED ORAL at 09:01

## 2025-01-29 RX ADMIN — Medication 1 TABLET: at 04:01

## 2025-01-29 RX ADMIN — MORPHINE SULFATE 4 MG: 4 INJECTION, SOLUTION INTRAMUSCULAR; INTRAVENOUS at 01:01

## 2025-01-29 RX ADMIN — METOPROLOL SUCCINATE 50 MG: 50 TABLET, FILM COATED, EXTENDED RELEASE ORAL at 09:01

## 2025-01-29 RX ADMIN — MORPHINE SULFATE 4 MG: 4 INJECTION, SOLUTION INTRAMUSCULAR; INTRAVENOUS at 09:01

## 2025-01-29 RX ADMIN — METHYLPREDNISOLONE SODIUM SUCCINATE 30 MG: 40 INJECTION, POWDER, FOR SOLUTION INTRAMUSCULAR; INTRAVENOUS at 02:01

## 2025-01-29 RX ADMIN — MAGNESIUM SULFATE HEPTAHYDRATE 2 G: 40 INJECTION, SOLUTION INTRAVENOUS at 01:01

## 2025-01-29 NOTE — SUBJECTIVE & OBJECTIVE
"Interval History: see "Hospital Course"    Review of Systems   Constitutional:  Positive for appetite change.   Gastrointestinal:  Positive for abdominal pain, blood in stool and nausea.     Objective:     Vital Signs (Most Recent):  Temp: 97.7 °F (36.5 °C) (01/28/25 2215)  Pulse: 90 (01/29/25 0928)  Resp: 17 (01/29/25 0906)  BP: (!) 152/76 (01/29/25 0928)  SpO2: 97 % (01/29/25 0928) Vital Signs (24h Range):  Temp:  [97.7 °F (36.5 °C)] 97.7 °F (36.5 °C)  Pulse:  [72-90] 90  Resp:  [16-19] 17  SpO2:  [95 %-98 %] 97 %  BP: (125-152)/(61-91) 152/76     Weight: 131.5 kg (290 lb)  Body mass index is 48.26 kg/m².    Intake/Output Summary (Last 24 hours) at 1/29/2025 1027  Last data filed at 1/28/2025 1409  Gross per 24 hour   Intake 1000 ml   Output --   Net 1000 ml         Physical Exam  Vitals and nursing note reviewed.   Constitutional:       General: He is not in acute distress.     Appearance: He is obese.   HENT:      Head: Normocephalic and atraumatic.      Right Ear: External ear normal.      Left Ear: External ear normal.      Nose: Nose normal.      Mouth/Throat:      Mouth: Mucous membranes are moist.   Eyes:      Extraocular Movements: Extraocular movements intact.      Conjunctiva/sclera: Conjunctivae normal.   Cardiovascular:      Rate and Rhythm: Normal rate and regular rhythm.      Pulses: Normal pulses.      Heart sounds: Normal heart sounds.   Pulmonary:      Effort: Pulmonary effort is normal.      Breath sounds: Normal breath sounds.   Abdominal:      General: Bowel sounds are normal.      Palpations: Abdomen is soft.      Tenderness: There is abdominal tenderness.   Musculoskeletal:         General: Normal range of motion.      Cervical back: Normal range of motion and neck supple.   Skin:     General: Skin is warm and dry.   Neurological:      Mental Status: Mental status is at baseline.   Psychiatric:         Mood and Affect: Mood normal.         Behavior: Behavior normal.             Significant " Labs: All pertinent labs within the past 24 hours have been reviewed.    Significant Imaging: I have reviewed all pertinent imaging results/findings within the past 24 hours.

## 2025-01-29 NOTE — ASSESSMENT & PLAN NOTE
-Continue IV steroids and colchicine  -IV fluids  -Diet as tolerated  -PRN analgesics and antiemetics  -GI consulted  -Follow up infectious workup including C diff testing

## 2025-01-29 NOTE — CONSULTS
GASTROENTEROLOGY INPATIENT CONSULT NOTE  Patient Name: Jacoby Jean-Baptiste  Patient MRN: [id number]  Patient : 1975    Admit Date: 2025  Service date: 2025    Reason for Consult: UC    PCP: Hunter Aguilar III, MD    Chief Complaint   Patient presents with    Rectal Bleeding       HPI: Patient is 49 y.o. male with PMHx 5HLD, DM on ozempic, HTN, LOLIS, umbilical hernia repair, tobacco use, C. diff s/p Vanco / dificid / FMT / rebyota presents for evaluation of  diarrhea. Acute/chronic onset, intermittent, progressive since stopping remicade. Was having 10+ BM daily but went down to 1-2 BM daily w/ intermittent bleeding while on remicade . Got fecal xpolant in 3/'23. Remicade stopped due to cavitary fungal lesions likely from immunosuppression from remicade. Started on Entyvio in  but seen again in hospital in early  & again in 3/'24 w/ severe disease. Omvoq started late  after insurance fight.  Had 3rd dose of loading Omvoq on 24.      Patient admitted with recurrence of bloody diarrhea 10 to 12 bowel movements a day as well as left upper screen abscess     Since admit, patient has been able to eat with less stool volume.  Pain is controlled with po pain medication.       8 d ago 1 mo ago 2 mo ago       Calprotectin 0 - 120 ug/g 5480 363CM 1050C          CHART REVIEW:  CTAP 25 CT findings compatible with acute colitis. While the degree of wall thickening and surrounding edema is similar to , the extent of involvement has increased slightly, now with mild wall thickening of the distal transverse colon and splenic flexure. No evidence of perforation or pneumatosis. No contrast extravasation to indicate a site of active gastrointestinal hemorrhage.   Colon 2025 Erythematous, eroded (linear-pattern), inflamed                          and ulcerated mucosa in the rectum, in the sigmoid                          colon, in the descending colon and in the                           transverse colon. Biopsied.                          - Erythematous mucosa in the ascending colon and                          in the cecum. Biopsied.                          - Extensive ulcerative colitis. Biopsied. There                          was mild sparing in right colon.     CT ABd 7/'24  C. Diff 7/'24 - negative  EGD/colon 6/'24 - small HH; ampullary tic; mild ileitis (TI nml previously.?backwash?), L sided colitis  -reflux esophagitis. focal intestinal metaplasia antrum. Mild ileitis; sigmoid/proctitis; findings c/w UC; CMV negative in L sided biopsies  Labs 6/'24 -  Calprotectin 2480 on Omvoh at around 2 months. (Was >8K on entyvio but had near normalized on remicade); Negative Stool PCR  Hospitalization 5/'24 - colitis on CT scan; Neg C. diff   Hospitalization 3/'24 - Calpro >8K; flex w/ severe L sided UC; placed on steroids. C. diff neg;....d/c entyvio  Hospitalization 2/'24; CT 2/'24 - fatty liver; L sided colitis; Calpro back up ot 3070 (201 on remicade); Neg C. diff  Entyvio started 1/'24  Hospitalization 11/'23 - cavitary fungal lesions  Labs 10/'23 - C.diff negative after vanco and rebyota enemas.  Labs 7/'23 -  Calprotectin 201 on remicade w/ C. diff...tx w/ vanco w/ long taper +/- rebyota; Nml CRP much better on remicade  EGD 4/'23 - candidate esophagitis / duodenitis; HP neg gastirits  Hospitalization 4/'23 - colitis on CT; C. diff neg; Remicade loaded  Colon 3/'23 - Pan-colitis; Nml TI; s/p FMT for refractory C. diff  C. DIff + 3/'23  Labs 1/'23 - CRP 21; Stool PCR + C. DIff....  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     Past Medical History:  Past Medical History:   Diagnosis Date    C. difficile colitis     Cavitary pneumonia 11/2023    pos aspergillus serology    Diabetes mellitus 01/2022    Hyperlipidemia 2015    Hypertension 2015    Insomnia 2015    Ulcerative  colitis         Past Surgical History:  Past Surgical History:   Procedure Laterality Date    BRONCHOSCOPY WITH FLUOROSCOPY Left 11/20/2023    Procedure: BRONCHOSCOPY, WITH FLUOROSCOPY;  Surgeon: Lisa Villarreal MD;  Location: [facility];  Service: Pulmonary;  Laterality: Left;    BRONCHOSCOPY WITH FLUOROSCOPY N/A 11/30/2023    Procedure: BRONCHOSCOPY, WITH FLUOROSCOPY;  Surgeon: Lisa Villarreal MD;  Location: [facility];  Service: Pulmonary;  Laterality: N/A;    CARDIAC ELECTROPHYSIOLOGY MAPPING AND ABLATION  2017    SVT    CHOLECYSTECTOMY  2011    COLONOSCOPY N/A 5/3/2022    Procedure: COLONOSCOPY;  Surgeon: Shan Jean III, MD;  Location: [facility];  Service: Endoscopy;  Laterality: N/A;    COLONOSCOPY N/A 3/16/2024    Procedure: COLONOSCOPY;  Surgeon: ALEKSANDER Ramos MD;  Location: [facility];  Service: Endoscopy;  Laterality: N/A;    COLONOSCOPY N/A 1/17/2025    Procedure: COLONOSCOPY;  Surgeon: Russ Rowe MD;  Location: [facility];  Service: Endoscopy;  Laterality: N/A;    COLONOSCOPY WITH FECAL MICROBIOTA TRANSFER N/A 3/21/2023    Procedure: COLONOSCOPY, WITH FECAL MICROBIOTA TRANSFER;  Surgeon: David Millan MD;  Location: [facility];  Service: Endoscopy;  Laterality: N/A;    ESOPHAGOGASTRODUODENOSCOPY N/A 4/24/2023    Procedure: EGD (ESOPHAGOGASTRODUODENOSCOPY);  Surgeon: Shan Jean III, MD;  Location: [facility];  Service: Endoscopy;  Laterality: N/A;    ESOPHAGOGASTRODUODENOSCOPY N/A 6/5/2024    Procedure: EGD (ESOPHAGOGASTRODUODENOSCOPY);  Surgeon: Odalis Mccabe MD;  Location: [facility];  Service: Endoscopy;  Laterality: N/A;    FLEXIBLE SIGMOIDOSCOPY N/A 6/5/2024    Procedure: SIGMOIDOSCOPY, FLEXIBLE;  Surgeon: Odalis Mccabe MD;  Location: [facility];  Service: Endoscopy;  Laterality: N/A;    FLEXIBLE SIGMOIDOSCOPY N/A 7/16/2024    Procedure: SIGMOIDOSCOPY, FLEXIBLE;  Surgeon: Shan Jean III, MD;  Location: [facility];  Service: Endoscopy;   Laterality: N/A;    FLEXIBLE SIGMOIDOSCOPY N/A 8/13/2024    Procedure: SIGMOIDOSCOPY, FLEXIBLE;  Surgeon: Shan Jean III, MD;  Location: [facility];  Service: Endoscopy;  Laterality: N/A;    GANGLION CYST EXCISION Left 1992    UMBILICAL HERNIA REPAIR  2011    with mesh        Home Medications:  (Not in a hospital admission)      Inpatient Medications:  Review of patient's allergies indicates:  No Known Allergies    Social History:   Social History     Occupational History    Not on file   Tobacco Use    Smoking status: Former     Average packs/day: 1 pack/day for 30.0 years (30.0 ttl pk-yrs)     Types: Cigarettes     Start date: 1993    Smokeless tobacco: Never    Tobacco comments:     Pt states he has stopped smoking with Chantix three weeks ago. 12/1/23   Substance and Sexual Activity    Alcohol use: Yes     Comment: ocass    Drug use: Not Currently    Sexual activity: Not Currently       Family History:   Family History   Problem Relation Name Age of Onset    Asthma Mother         Review of Systems:  GENERAL: No fever, chills, weight loss  SKIN: No rashes, jaundice, pruritus  HEENT: No rhinorrhea, epistaxis, vision changes.  No trauma, tinnitus, lymphadenopathy or pharyngitis  CV: No chest pain, palpitations, edima or BAILEY  PULM: No cough or sputum production.  No wheezing.  GI: AS IN HPI  URINARY:  No hematuria, dysuria  MS: No change in muscle or joint pain, weakness, or ROM  Neuro: No focal neurologic changes  PSYCH: No change in mood or personality.  No suicidal ideation.  ENDOCRINE: No fatigue      OBJECTIVE:    Vitals:    01/28/25 1515 01/28/25 1615 01/28/25 2000 01/28/25 2024   BP:   (!) 143/75    BP Location:       Pulse:   76    Resp: 16 18 18 18   Temp:       TempSrc:       SpO2:   96%    Weight:       Height:         Physical Exam:    GEN: well-developed, well-nourished, awake and alert, non-toxic appearing adult  HEENT: PERRL, sclera anicteric, oral mucosa pink and moist without lesion  NECK:  "trachea midline; Good ROM  CV: regular rate and rhythm, no murmurs or gallops  RESP: clear to auscultation bilaterally, no wheezes, rhonci or rales  ABD: soft, non-tender, non-distended, normal bowel sounds  EXT: no swelling or edema, 2+ pulses distally  SKIN: no rashes or jaundice  PSYCH: normal affect    Labs:   Recent Labs   Lab 01/28/25  1101   WBC 16.16*   HGB 10.7*      MCV 87     No results for input(s): "IRON", "FERRITIN" in the last 168 hours.    Invalid input(s): "IRONSAT"  Recent Labs   Lab 01/28/25  1101      K 4.5   BUN 17   CREATININE 1.1   ALBUMIN 3.7   AST 17   ALT 15   ALKPHOS 49*            Radiology Review:  Imaging Results              X-Ray Chest PA And Lateral (Final result)  Result time 01/28/25 14:36:45   Procedure changed from X-Ray Chest AP Portable     Final result by Consuelo Shea MD (01/28/25 14:36:45)                   Impression:      No acute pulmonary process      Electronically signed by: Consuelo Shea  Date:    01/28/2025  Time:    14:36               Narrative:    EXAMINATION:  XR CHEST PA AND LATERAL    CLINICAL HISTORY:  pain;Sepsis;    FINDINGS:  PA and lateral chest is compared to 01/16/2025 shows normal cardiomediastinal silhouette.    Lungs are clear. Pulmonary vasculature is normal. No acute osseous abnormality.                                       CTA Acute GI Bleed, Abdomen and Pelvis (Final result)  Result time 01/28/25 13:52:23   Procedure changed from CT Abdomen Pelvis With IV Contrast NO Oral Contrast     Final result by Samuel Boyle MD (01/28/25 13:52:23)                   Impression:      1. CT findings compatible with acute colitis.  While the degree of wall thickening and surrounding edema is similar to January 16, the extent of involvement has increased slightly, now with mild wall thickening of the distal transverse colon and splenic flexure.  No evidence of perforation or pneumatosis.  No contrast extravasation to indicate a site " of active gastrointestinal hemorrhage.  2. Additional observations as above.      Electronically signed by: Samuel Lozajane  Date:    01/28/2025  Time:    13:52               Narrative:    EXAMINATION:  CTA ACUTE GI BLEED, ABDOMEN AND PELVIS    CLINICAL HISTORY:  GI bleed;GI BLEED; history of ulcerative colitis.    COMPARISON:  January 16, 2025    FINDINGS:  Multiphase pre and post infusion imaging was performed.  CT angiographic protocol was utilized.  100 cc nonionic contrast was administered for the exam.    The lung bases are unremarkable.    The liver has a normal appearance.  The gallbladder is absent.  The biliary tree is nondilated.  The spleen, pancreas, and adrenal glands are normal.  Bilateral nonobstructing renal calculi are noted, the largest at the lower pole on the left measuring 7 mm.  There is no hydronephrosis.  The abdominal aorta is normal in caliber with mild atherosclerotic calcification.  There is moderate calcification at the origin of the superior mesenteric artery, with at least mild luminal diameter narrowing.    Evaluation of bowel structures reveals abnormal circumferential wall thickening of the colon, with involvement of the distal transverse colon, splenic flexure, descending colon, and rectosigmoid colon.  Wall thickening of the distal transverse colon and splenic flexure is new compared to January 16.  The degree of wall thickening of the descending colon, sigmoid colon, and rectum is unchanged, with stable surrounding edema.  There is no evidence of perforation or pneumatosis.  No contrast extravasation is identified to indicate a site of active gastrointestinal hemorrhage.  There is no free air or free fluid.    Images of the pelvis demonstrate a normal appearing urinary bladder.  There is no free fluid or lymphadenopathy.  Rim calcified nodular density is noted anteriorly near midline within the peritoneal cavity, of no current significance.    No acute osseous abnormalities  are identified.                                          IMPRESSION / RECOMMENDATIONS:   Active Problem List with Overview Notes    Diagnosis Date Noted    C. difficile colitis 01/15/2025    Recurrent Clostridioides difficile infection 01/10/2025    Colitis 12/31/2024    Leukocytosis 12/31/2024    Anemia 12/23/2024    Gastroparesis 12/23/2024    Nausea 12/22/2024    Dizziness 12/16/2024    DVT (deep venous thrombosis) 12/06/2024    Panic attacks 12/02/2024    History of DVT (deep vein thrombosis) 11/04/2024 Sept 2024 in [facility]      Acute pain 09/18/2024    Anxiety 09/04/2024    Wound infection 08/28/2024    Open wound of left upper arm 08/21/2024    Sepsis 08/09/2024    Acute renal failure 08/09/2024    Skin abscess 08/09/2024    Proctocolitis 08/04/2024    Depression 06/04/2024    Anticoagulated 04/11/2024    Other specified interstitial pulmonary diseases 01/09/2024    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    Long-term use of immunosuppressant medication 12/02/2023    Hepatosplenomegaly 11/30/2023    Chest pain 11/17/2023    PUD (peptic ulcer disease) 04/24/2023    Exacerbation of ulcerative colitis without complication 04/20/2023    History of Clostridioides difficile colitis 04/20/2023    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 male with PMHx 5HLD, DM on ozempic, HTN, LOLIS, umbilical hernia repair, tobacco use, C. diff s/p Vanco / dificid / FMT / rebyota currently on Tremfya last dose December 30th  IBD specialty clinic  referral sent to dr. Beal at [facility] and calprotectin pending  Tremfya 200 mg infusion 12/30/24 dose week 4.  Next dose scheduled 2/7/2025  Ismael is awaiting patient at [facility]  Stop colchicine ( had been started for pericarditis but that is stable) and reglan  Dificid resent to [facility].   appt 2/4/2025  [facility]      Thank you for this consult.    Odalis Mccabe  1/28/2025  10:11 PM

## 2025-01-29 NOTE — ASSESSMENT & PLAN NOTE
This patient does have evidence of infective focus  My overall impression is sepsis.  Source: Abdominal  Antibiotics given-   Antibiotics (72h ago, onward)      None     No antibiotics given per GI  given patient's history     Latest lactate reviewed-  Recent Labs   Lab 01/28/25  1523 01/28/25 2023   LACTATE  --  1.0   POCLAC 2.36*  --        Organ dysfunction indicated by  elevated lactic likely related to underlying UC/in recurrent C diff    Fluid challenge Ideal Body Weight- The patient's ideal body weight is Ideal body weight: 61.5 kg (135 lb 9.3 oz) which will be used to calculate fluid bolus of 30 ml/kg for treatment of septic shock.

## 2025-01-29 NOTE — PLAN OF CARE
met with the patient at the bedside to complete the discharge readmission assessment. Patient had recent admission 01/15/25-01/18/25. He verified PCP and demographics. Pharmacy: [facility] in [place]. Patient Dc'd with [facility] (RN was coming previously for wound care but patient no longer has wounds). Patient drives and is also no longer home bound. Patient remains independent in all ADLs and does not use any DME. CM will continue to follow and assess DC needs.        01/29/25 0911   Readmission   Was this a planned readmission? No   Why were you hospitalized in the last 30 days? Yes   Why were you readmitted? Related to previous admission   When you left the hospital how did you feel? Patient felt okay   When you left the hospital where did you go? Home with Home Health   Did patient/caregiver refused recommended DC plan? No   Tell me about what happened between when you left the hospital and the day you returned. Started having a lot more rectal bleeding and vomiting   When did you start not feeling well? 01/24/25   Did you try to manage your symptoms your self? Yes   What did you do? Patient stated he contacted his GI MD, said he stopped eating and drinking more water   Did you call anyone? Yes   Who did you call? Specialist   Did you try to see or did see a doctor or nurse before you came? Yes   Were you seen? No   Why?   (He did not hear back from the provider)   Did you have  a follow-up appointment on discharge? Yes   Did you go? No   Why? Not feeling well

## 2025-01-29 NOTE — HOSPITAL COURSE
Jacoby Jean-Baptiste is a 50 year old male with a past medical history of UC, C diff colitis, DM, HTN, HLD, MDD/YOU, GERD, DVT on Eliquis, and anemia who presented with an acute flare of UC. He is on IV fluids and IV steroids. He is tolerating a PO diet. GI has been consulted. GI sent difficid to pharmacy for discharge.  Patient was discharged home once cleared by GI and given tapering dose of steroid and recommended to follow-up with GI outpatient

## 2025-01-29 NOTE — PROGRESS NOTES
[facility] - Emergency Dept  Hospital Medicine  Progress Note    Patient Name: Jacoby Jean-Baptiste  MRN: [id number]  Patient Class: IP- Inpatient   Admission Date: 1/28/2025  Length of Stay: 1 days  Attending Physician: David Conteh MD  Primary Care Provider: Hunter Aguilar III, MD        Subjective     Principal Problem:Exacerbation of ulcerative colitis without complication        HPI:  51 y/o male with pMHx of lymphedema, hypertension, cavitary pneumonia, and ulcerative colitis h/o fecal transplant who presented to the ED with complaints of 2 days of progressively worsening rectal bleeding and vomiting.  Patient reports that he basically lives with the diffuse left lower quadrant pain however since his discharge he has had a decrease in appetite.  Two days ago the patient began with vomiting and worsening rectal bleeding.  ED MD spoke with GI who does not recommend patient receiving any antibiotics at this time due to his history of C difficile colitis and fecal transplant.  On review of GI note patient with an appointment with Jessica Church MD on February 1st.    Triage vitals with pulse 83, /80, temp 98.7°, SpO2 99%.  Blood work performed in the ED with WBC 16.16, hemoglobin 10.7, hematocrit 34.8, alk phos 49, lactate 2.3.  Chest x-ray with no acute pulmonary process.  CTA with findings compatible of acute colitis while degree of wall thickening and surrounding edema is similar to January the extent of involvement has increased slightly now with mild wall thickening of the distal transverse colon and splenic flexure.  No evidence of perforation or pneumatosis.  No contrast extravasation to indicate a site of active gastrointestinal hemorrhage.    Overview/Hospital Course:  Jacoby Jean-Baptiste is a 50 year old male with a past medical history of UC, C diff colitis, DM, HTN, HLD, MDD/YOU, GERD, DVT on Eliquis, and anemia who presented with an acute flare of UC. He is on IV fluids and IV  "steroids. He is tolerating a PO diet. GI has been consulted.    Interval History: see "Hospital Course"    Review of Systems   Constitutional:  Positive for appetite change.   Gastrointestinal:  Positive for abdominal pain, blood in stool and nausea.     Objective:     Vital Signs (Most Recent):  Temp: 97.7 °F (36.5 °C) (01/28/25 2215)  Pulse: 90 (01/29/25 0928)  Resp: 17 (01/29/25 0906)  BP: (!) 152/76 (01/29/25 0928)  SpO2: 97 % (01/29/25 0928) Vital Signs (24h Range):  Temp:  [97.7 °F (36.5 °C)] 97.7 °F (36.5 °C)  Pulse:  [72-90] 90  Resp:  [16-19] 17  SpO2:  [95 %-98 %] 97 %  BP: (125-152)/(61-91) 152/76     Weight: 131.5 kg (290 lb)  Body mass index is 48.26 kg/m².    Intake/Output Summary (Last 24 hours) at 1/29/2025 1027  Last data filed at 1/28/2025 1409  Gross per 24 hour   Intake 1000 ml   Output --   Net 1000 ml         Physical Exam  Vitals and nursing note reviewed.   Constitutional:       General: He is not in acute distress.     Appearance: He is obese.   HENT:      Head: Normocephalic and atraumatic.      Right Ear: External ear normal.      Left Ear: External ear normal.      Nose: Nose normal.      Mouth/Throat:      Mouth: Mucous membranes are moist.   Eyes:      Extraocular Movements: Extraocular movements intact.      Conjunctiva/sclera: Conjunctivae normal.   Cardiovascular:      Rate and Rhythm: Normal rate and regular rhythm.      Pulses: Normal pulses.      Heart sounds: Normal heart sounds.   Pulmonary:      Effort: Pulmonary effort is normal.      Breath sounds: Normal breath sounds.   Abdominal:      General: Bowel sounds are normal.      Palpations: Abdomen is soft.      Tenderness: There is abdominal tenderness.   Musculoskeletal:         General: Normal range of motion.      Cervical back: Normal range of motion and neck supple.   Skin:     General: Skin is warm and dry.   Neurological:      Mental Status: Mental status is at baseline.   Psychiatric:         Mood and Affect: Mood " "normal.         Behavior: Behavior normal.             Significant Labs: All pertinent labs within the past 24 hours have been reviewed.    Significant Imaging: I have reviewed all pertinent imaging results/findings within the past 24 hours.    Assessment and Plan     * Exacerbation of ulcerative colitis without complication  -Continue IV steroids and colchicine  -IV fluids  -Diet as tolerated  -PRN analgesics and antiemetics  -GI consulted  -Follow up infectious workup including C diff testing    HLD (hyperlipidemia)  -Statin      Recurrent Clostridioides difficile infection  Gastroenterology aware      Anemia  Chronic. Stable.  -Trend Hgb with CBC    DVT (deep venous thrombosis)  -Continue home Xarelto    Anxiety  -Continue home Buspar and Lexapro      BMI 45.0-49.9, adult  Body mass index is 48.26 kg/m². Morbid obesity complicates all aspects of disease management from diagnostic modalities to treatment.         Type 2 diabetes mellitus without complication, without long-term current use of insulin  Patient's FSGs are controlled on current medication regimen.  Last A1c reviewed-   Lab Results   Component Value Date    HGBA1C 5.8 11/02/2024     Most recent fingerstick glucose reviewed- No results for input(s): "POCTGLUCOSE" in the last 24 hours.    Antihyperglycemics (From admission, onward)      None          Hold Oral hypoglycemics while patient is in the hospital.       History of supraventricular tachycardia  -Continue metoprolol  -Telemetry        VTE Risk Mitigation (From admission, onward)           Ordered     rivaroxaban tablet 20 mg  With dinner         01/28/25 2109     Reason for No Pharmacological VTE Prophylaxis  Once        Question:  Reasons:  Answer:  Already adequately anticoagulated on oral Anticoagulants    01/28/25 1733     IP VTE HIGH RISK PATIENT  Once         01/28/25 1733     Place sequential compression device  Until discontinued         01/28/25 1733                    Discharge Planning "   GERARDO:      Code Status: Full Code   Medical Readiness for Discharge Date:   Discharge Plan A: Home                        David Conteh MD  Department of Hospital Medicine   [facility] - Emergency Dept

## 2025-01-29 NOTE — H&P
[facility] - Emergency Dept  Hospital Medicine  History & Physical    Patient Name: Jacoby Jean-Baptiste  MRN: [id number]  Patient Class: IP- Inpatient  Admission Date: 1/28/2025  Attending Physician: Noe Juarez MD   Primary Care Provider: Hunter Aguilar III, MD         Patient information was obtained from patient and ER records.     Subjective:     Principal Problem:Sepsis    Chief Complaint:   Chief Complaint   Patient presents with    Rectal Bleeding        HPI: 49 y/o male with pMHx of lymphedema, hypertension, cavitary pneumonia, and ulcerative colitis h/o fecal transplant who presented to the ED with complaints of 2 days of progressively worsening rectal bleeding and vomiting.  Patient reports that he basically lives with the diffuse left lower quadrant pain however since his discharge he has had a decrease in appetite.  Two days ago the patient began with vomiting and worsening rectal bleeding.  ED MD spoke with GI who does not recommend patient receiving any antibiotics at this time due to his history of C difficile colitis and fecal transplant.  On review of GI note patient with an appointment with Jessica Church MD on February 1st.    Triage vitals with pulse 83, /80, temp 98.7°, SpO2 99%.  Blood work performed in the ED with WBC 16.16, hemoglobin 10.7, hematocrit 34.8, alk phos 49, lactate 2.3.  Chest x-ray with no acute pulmonary process.  CTA with findings compatible of acute colitis while degree of wall thickening and surrounding edema is similar to January the extent of involvement has increased slightly now with mild wall thickening of the distal transverse colon and splenic flexure.  No evidence of perforation or pneumatosis.  No contrast extravasation to indicate a site of active gastrointestinal hemorrhage.    Past Medical History:   Diagnosis Date    C. difficile colitis     Cavitary pneumonia 11/2023    pos aspergillus serology    Diabetes mellitus 01/2022     Hyperlipidemia 2015    Hypertension 2015    Insomnia 2015    Ulcerative colitis        Past Surgical History:   Procedure Laterality Date    BRONCHOSCOPY WITH FLUOROSCOPY Left 11/20/2023    Procedure: BRONCHOSCOPY, WITH FLUOROSCOPY;  Surgeon: Lisa Villarreal MD;  Location: [facility];  Service: Pulmonary;  Laterality: Left;    BRONCHOSCOPY WITH FLUOROSCOPY N/A 11/30/2023    Procedure: BRONCHOSCOPY, WITH FLUOROSCOPY;  Surgeon: Lisa Villarreal MD;  Location: [facility];  Service: Pulmonary;  Laterality: N/A;    CARDIAC ELECTROPHYSIOLOGY MAPPING AND ABLATION  2017    SVT    CHOLECYSTECTOMY  2011    COLONOSCOPY N/A 5/3/2022    Procedure: COLONOSCOPY;  Surgeon: Shan Jean III, MD;  Location: [facility];  Service: Endoscopy;  Laterality: N/A;    COLONOSCOPY N/A 3/16/2024    Procedure: COLONOSCOPY;  Surgeon: ALEKSANDER Ramos MD;  Location: [facility];  Service: Endoscopy;  Laterality: N/A;    COLONOSCOPY N/A 1/17/2025    Procedure: COLONOSCOPY;  Surgeon: Russ Rowe MD;  Location: [facility];  Service: Endoscopy;  Laterality: N/A;    COLONOSCOPY WITH FECAL MICROBIOTA TRANSFER N/A 3/21/2023    Procedure: COLONOSCOPY, WITH FECAL MICROBIOTA TRANSFER;  Surgeon: David Millan MD;  Location: [facility];  Service: Endoscopy;  Laterality: N/A;    ESOPHAGOGASTRODUODENOSCOPY N/A 4/24/2023    Procedure: EGD (ESOPHAGOGASTRODUODENOSCOPY);  Surgeon: Shan Jean III, MD;  Location: [facility];  Service: Endoscopy;  Laterality: N/A;    ESOPHAGOGASTRODUODENOSCOPY N/A 6/5/2024    Procedure: EGD (ESOPHAGOGASTRODUODENOSCOPY);  Surgeon: Odalis Mccabe MD;  Location: [facility];  Service: Endoscopy;  Laterality: N/A;    FLEXIBLE SIGMOIDOSCOPY N/A 6/5/2024    Procedure: SIGMOIDOSCOPY, FLEXIBLE;  Surgeon: Odalis Mccabe MD;  Location: [facility] ENDO;  Service: Endoscopy;  Laterality: N/A;    FLEXIBLE SIGMOIDOSCOPY N/A 7/16/2024    Procedure: SIGMOIDOSCOPY, FLEXIBLE;  Surgeon: Shan Jean III, MD;   Location: [facility] ENDO;  Service: Endoscopy;  Laterality: N/A;    FLEXIBLE SIGMOIDOSCOPY N/A 8/13/2024    Procedure: SIGMOIDOSCOPY, FLEXIBLE;  Surgeon: Shan Jean III, MD;  Location: [facility] ENDO;  Service: Endoscopy;  Laterality: N/A;    GANGLION CYST EXCISION Left 1992    UMBILICAL HERNIA REPAIR  2011    with mesh       Review of patient's allergies indicates:  No Known Allergies    Current Facility-Administered Medications on File Prior to Encounter   Medication    lactated ringers infusion     Current Outpatient Medications on File Prior to Encounter   Medication Sig    budesonide (ENTOCORT EC) 3 mg capsule Take 3 capsules (9 mg total) by mouth once daily.    busPIRone (BUSPAR) 15 MG tablet Take 1 tablet (15 mg total) by mouth 3 (three) times daily.    colchicine (COLCRYS) 0.6 mg tablet Take 1 tablet (0.6 mg total) by mouth 2 (two) times daily.    EScitalopram oxalate (LEXAPRO) 20 MG tablet Take 1 tablet (20 mg total) by mouth once daily.    hyoscyamine (LEVBID) 0.375 mg Tb12 Take 0.375 mg by mouth 2 (two) times daily.    Lactobac 2-Bifido 1-S. therm (VSL#3) 112.5 billion cell Cap Take 1 capsule by mouth twice a day    LORazepam (ATIVAN) 0.5 MG tablet Take 1 tablet (0.5 mg total) by mouth every 6 (six) hours as needed for Anxiety.    magnesium oxide (MAG-OX) 400 mg (241.3 mg magnesium) tablet Take 1 tablet (400 mg total) by mouth 2 (two) times daily.    metoprolol succinate (TOPROL-XL) 50 MG 24 hr tablet Take 1 tablet (50 mg total) by mouth once daily.    pantoprazole (PROTONIX) 40 MG tablet Take 40 mg by mouth 2 (two) times daily.    promethazine (PHENERGAN) 25 MG tablet Take 25 mg by mouth 4 (four) times daily as needed for Nausea.    rivaroxaban (XARELTO) 20 mg Tab Take 1 tablet (20 mg total) by mouth daily with dinner or evening meal.    simvastatin (ZOCOR) 20 MG tablet Take 1 tablet (20 mg total) by mouth every evening.    zolpidem (AMBIEN) 10 mg Tab Take 1 tablet (10 mg total) by mouth nightly as  needed (insomnia).    bezlotoxumab (ZINPLAVA) 25 mg/mL Soln injection Inject 10 mg/kg intravenously single dose, during antibacterial treatment (Patient not taking: Reported on 1/28/2025)    fidaxomicin (DIFICID) 200 mg Tab Take 1 tablet (200 mg total) by mouth 2 (two) times daily. for 10 days (Patient not taking: Reported on 1/28/2025)     Family History       Problem Relation (Age of Onset)    Asthma Mother          Tobacco Use    Smoking status: Former     Average packs/day: 1 pack/day for 30.0 years (30.0 ttl pk-yrs)     Types: Cigarettes     Start date: 1993    Smokeless tobacco: Never    Tobacco comments:     Pt states he has stopped smoking with Chantix three weeks ago. 12/1/23   Substance and Sexual Activity    Alcohol use: Yes     Comment: ocass    Drug use: Not Currently    Sexual activity: Not Currently     Review of Systems   Constitutional:  Positive for appetite change.   Gastrointestinal:  Positive for abdominal pain, anal bleeding, blood in stool and nausea.     Objective:     Vital Signs (Most Recent):  Temp: 98.7 °F (37.1 °C) (01/28/25 0929)  Pulse: 83 (01/28/25 0929)  Resp: 18 (01/28/25 1615)  BP: 133/80 (01/28/25 0929)  SpO2: 99 % (01/28/25 0929) Vital Signs (24h Range):  Temp:  [98.7 °F (37.1 °C)] 98.7 °F (37.1 °C)  Pulse:  [83] 83  Resp:  [16-20] 18  SpO2:  [99 %] 99 %  BP: (133)/(80) 133/80     Weight: 131.5 kg (290 lb)  Body mass index is 48.26 kg/m².     Physical Exam  Vitals reviewed.   Constitutional:       Appearance: Normal appearance.   HENT:      Head: Normocephalic and atraumatic.      Nose: No congestion.      Mouth/Throat:      Mouth: Mucous membranes are moist.      Pharynx: Oropharynx is clear.   Eyes:      Extraocular Movements: Extraocular movements intact.      Pupils: Pupils are equal, round, and reactive to light.   Cardiovascular:      Rate and Rhythm: Normal rate and regular rhythm.      Pulses: Normal pulses.      Heart sounds: Normal heart sounds.   Pulmonary:       Effort: Pulmonary effort is normal.      Breath sounds: Normal breath sounds.   Abdominal:      General: Bowel sounds are normal.      Palpations: Abdomen is soft.   Musculoskeletal:         General: Normal range of motion.      Cervical back: Normal range of motion and neck supple.   Skin:     General: Skin is warm and dry.      Capillary Refill: Capillary refill takes less than 2 seconds.   Neurological:      General: No focal deficit present.      Mental Status: He is alert and oriented to person, place, and time. Mental status is at baseline.   Psychiatric:         Mood and Affect: Mood normal.         Behavior: Behavior normal.         Judgment: Judgment normal.              CRANIAL NERVES     CN III, IV, VI   Pupils are equal, round, and reactive to light.       Significant Labs: All pertinent labs within the past 24 hours have been reviewed.  Recent Lab Results         01/28/25  1523   01/28/25  1112   01/28/25  1101        Albumin     3.7       ALP     49       ALT     15       Anion Gap     6       AST     17       Baso #     0.09       Basophil %     0.6       BILIRUBIN TOTAL     0.4  Comment: For infants and newborns, interpretation of results should be based  on gestational age, weight and in agreement with clinical  observations.    Premature Infant recommended reference ranges:  Up to 24 hours.............<8.0 mg/dL  Up to 48 hours............<12.0 mg/dL  3-5 days..................<15.0 mg/dL  6-29 days.................<15.0 mg/dL         BUN     17       Calcium     9.0       Chloride     105       CO2     27       Creatinine     1.1       Differential Method     Automated       eGFR     >60.0       Eos #     0.6       Eos %     3.4       Glucose     94       Gran # (ANC)     12.6       Gran %     77.9       Group & Rh     A POS       Hematocrit     34.8       Hemoglobin     10.7       Immature Grans (Abs)     0.11  Comment: Mild elevation in immature granulocytes is non specific and   can be seen in  a variety of conditions including stress response,   acute inflammation, trauma and pregnancy. Correlation with other   laboratory and clinical findings is essential.         Immature Granulocytes     0.7       INDIRECT FLACO     NEG       Lymph #     1.5       Lymph %     9.2       MCH     26.8       MCHC     30.7       MCV     87       Mono #     1.3       Mono %     8.2       MPV     10.3       nRBC     0       Occult Blood   Positive         Platelet Count     262       POC Lactate 2.36           Potassium     4.5       PROTEIN TOTAL     6.8       RBC     4.00       RDW     19.3       Sample VENOUS           Sodium     138       Specimen Outdate     01/31/2025 23:59       WBC     16.16               Significant Imaging: I have reviewed all pertinent imaging results/findings within the past 24 hours.    X-Ray Chest PA And Lateral  Narrative: EXAMINATION:  XR CHEST PA AND LATERAL    CLINICAL HISTORY:  pain;Sepsis;    FINDINGS:  PA and lateral chest is compared to 01/16/2025 shows normal cardiomediastinal silhouette.    Lungs are clear. Pulmonary vasculature is normal. No acute osseous abnormality.  Impression: No acute pulmonary process    Electronically signed by: Consuelo Shea  Date:    01/28/2025  Time:    14:36  CTA Acute GI Bleed, Abdomen and Pelvis  Narrative: EXAMINATION:  CTA ACUTE GI BLEED, ABDOMEN AND PELVIS    CLINICAL HISTORY:  GI bleed;GI BLEED; history of ulcerative colitis.    COMPARISON:  January 16, 2025    FINDINGS:  Multiphase pre and post infusion imaging was performed.  CT angiographic protocol was utilized.  100 cc nonionic contrast was administered for the exam.    The lung bases are unremarkable.    The liver has a normal appearance.  The gallbladder is absent.  The biliary tree is nondilated.  The spleen, pancreas, and adrenal glands are normal.  Bilateral nonobstructing renal calculi are noted, the largest at the lower pole on the left measuring 7 mm.  There is no hydronephrosis.  The  abdominal aorta is normal in caliber with mild atherosclerotic calcification.  There is moderate calcification at the origin of the superior mesenteric artery, with at least mild luminal diameter narrowing.    Evaluation of bowel structures reveals abnormal circumferential wall thickening of the colon, with involvement of the distal transverse colon, splenic flexure, descending colon, and rectosigmoid colon.  Wall thickening of the distal transverse colon and splenic flexure is new compared to January 16.  The degree of wall thickening of the descending colon, sigmoid colon, and rectum is unchanged, with stable surrounding edema.  There is no evidence of perforation or pneumatosis.  No contrast extravasation is identified to indicate a site of active gastrointestinal hemorrhage.  There is no free air or free fluid.    Images of the pelvis demonstrate a normal appearing urinary bladder.  There is no free fluid or lymphadenopathy.  Rim calcified nodular density is noted anteriorly near midline within the peritoneal cavity, of no current significance.    No acute osseous abnormalities are identified.  Impression: 1. CT findings compatible with acute colitis.  While the degree of wall thickening and surrounding edema is similar to January 16, the extent of involvement has increased slightly, now with mild wall thickening of the distal transverse colon and splenic flexure.  No evidence of perforation or pneumatosis.  No contrast extravasation to indicate a site of active gastrointestinal hemorrhage.  2. Additional observations as above.    Electronically signed by: Samuel Boyle  Date:    01/28/2025  Time:    13:52      Assessment/Plan:     * Sepsis  This patient does have evidence of infective focus  My overall impression is sepsis.  Source: Abdominal  Antibiotics given-   Antibiotics (72h ago, onward)      None     No antibiotics given per GI  given patient's history     Latest lactate reviewed-  Recent Labs   Lab  01/28/25  1523   POCLAC 2.36*     Organ dysfunction indicated by  elevated lactic likely related to underlying UC/in recurrent C diff    Fluid challenge Ideal Body Weight- The patient's ideal body weight is Ideal body weight: 61.5 kg (135 lb 9.3 oz) which will be used to calculate fluid bolus of 30 ml/kg for treatment of septic shock.        Exacerbation of ulcerative colitis without complication  Patient given Solu-Medrol 125 mg IV x1  Solumedrol 30 mg bid  Calprotectin/c-dif ordered  Resume home Levsin dose        Recurrent Clostridioides difficile infection  Gastroenterology aware      DVT (deep venous thrombosis)  Patient remain on Xarelto as ordered      BMI 45.0-49.9, adult  Body mass index is 48.26 kg/m². Morbid obesity complicates all aspects of disease management from diagnostic modalities to treatment. Weight loss encouraged and health benefits explained to patient.           VTE Risk Mitigation (From admission, onward)           Ordered     Reason for No Pharmacological VTE Prophylaxis  Once        Question:  Reasons:  Answer:  Already adequately anticoagulated on oral Anticoagulants    01/28/25 1733     IP VTE HIGH RISK PATIENT  Once         01/28/25 1733     Place sequential compression device  Until discontinued         01/28/25 1733                                    Karoline Gotti NP  Department of Hospital Medicine  [facility] - Emergency Dept

## 2025-01-29 NOTE — ASSESSMENT & PLAN NOTE
Body mass index is 48.26 kg/m². Morbid obesity complicates all aspects of disease management from diagnostic modalities to treatment.

## 2025-01-29 NOTE — ASSESSMENT & PLAN NOTE
"Patient's FSGs are controlled on current medication regimen.  Last A1c reviewed-   Lab Results   Component Value Date    HGBA1C 5.8 11/02/2024     Most recent fingerstick glucose reviewed- No results for input(s): "POCTGLUCOSE" in the last 24 hours.    Antihyperglycemics (From admission, onward)      None          Hold Oral hypoglycemics while patient is in the hospital.     "

## 2025-01-30 LAB
ALBUMIN SERPL BCP-MCNC: 3.5 G/DL (ref 3.5–5.2)
ALP SERPL-CCNC: 49 U/L (ref 55–135)
ALT SERPL W/O P-5'-P-CCNC: 12 U/L (ref 10–44)
ANION GAP SERPL CALC-SCNC: 8 MMOL/L (ref 8–16)
AST SERPL-CCNC: 10 U/L (ref 10–40)
ASTROVIRUS: NOT DETECTED
BASOPHILS # BLD AUTO: 0.01 K/UL (ref 0–0.2)
BASOPHILS NFR BLD: 0.1 % (ref 0–1.9)
BILIRUB SERPL-MCNC: 0.4 MG/DL (ref 0.1–1)
BUN SERPL-MCNC: 18 MG/DL (ref 6–20)
C COLI+JEJ+UPSA DNA STL QL NAA+NON-PROBE: NOT DETECTED
CALCIUM SERPL-MCNC: 8.8 MG/DL (ref 8.7–10.5)
CHLORIDE SERPL-SCNC: 103 MMOL/L (ref 95–110)
CO2 SERPL-SCNC: 24 MMOL/L (ref 23–29)
CREAT SERPL-MCNC: 1 MG/DL (ref 0.5–1.4)
CYCLOSPORA CAYETANENSIS: NOT DETECTED
DIFFERENTIAL METHOD BLD: ABNORMAL
ENTEROAGGREGATIVE E COLI: NOT DETECTED
ENTEROPATHOGENIC E COLI: NOT DETECTED
EOSINOPHIL # BLD AUTO: 0 K/UL (ref 0–0.5)
EOSINOPHIL NFR BLD: 0.1 % (ref 0–8)
ERYTHROCYTE [DISTWIDTH] IN BLOOD BY AUTOMATED COUNT: 18.8 % (ref 11.5–14.5)
EST. GFR  (NO RACE VARIABLE): >60 ML/MIN/1.73 M^2
GLUCOSE SERPL-MCNC: 122 MG/DL (ref 70–110)
GPP - ADENOVIRUS 40/41: NOT DETECTED
GPP - CRYPTOSPORIDIUM: NOT DETECTED
GPP - ENTAMOEBA HISTOLYTICA: NOT DETECTED
GPP - ENTEROTOXIGENIC E COLI (ETEC): NOT DETECTED
GPP - GIARDIA LAMBLIA: NOT DETECTED
GPP - NOROVIRUS GI/GII: NOT DETECTED
GPP - ROTAVIRUS A: NOT DETECTED
GPP - SALMONELLA: NOT DETECTED
GPP - VIBRIO CHOLERA: NOT DETECTED
GPP - YERSINIA ENTEROCOLITICA: NOT DETECTED
HCT VFR BLD AUTO: 33.5 % (ref 40–54)
HGB BLD-MCNC: 10.2 G/DL (ref 14–18)
IMM GRANULOCYTES # BLD AUTO: 0.06 K/UL (ref 0–0.04)
IMM GRANULOCYTES NFR BLD AUTO: 0.4 % (ref 0–0.5)
LACTATE PLASV-SCNC: NOT DETECTED MMOL/L
LYMPHOCYTES # BLD AUTO: 1.1 K/UL (ref 1–4.8)
LYMPHOCYTES NFR BLD: 7.7 % (ref 18–48)
MAGNESIUM SERPL-MCNC: 2 MG/DL (ref 1.6–2.6)
MCH RBC QN AUTO: 26.4 PG (ref 27–31)
MCHC RBC AUTO-ENTMCNC: 30.4 G/DL (ref 32–36)
MCV RBC AUTO: 87 FL (ref 82–98)
MONOCYTES # BLD AUTO: 1 K/UL (ref 0.3–1)
MONOCYTES NFR BLD: 7.1 % (ref 4–15)
NEUTROPHILS # BLD AUTO: 11.6 K/UL (ref 1.8–7.7)
NEUTROPHILS NFR BLD: 84.6 % (ref 38–73)
NRBC BLD-RTO: 0 /100 WBC
PLATELET # BLD AUTO: 267 K/UL (ref 150–450)
PLESIOMONAS SHIGELLOIDES: NOT DETECTED
PMV BLD AUTO: 10.4 FL (ref 9.2–12.9)
POCT GLUCOSE: 133 MG/DL (ref 70–110)
POTASSIUM SERPL-SCNC: 4.8 MMOL/L (ref 3.5–5.1)
PROT SERPL-MCNC: 6.7 G/DL (ref 6–8.4)
RBC # BLD AUTO: 3.86 M/UL (ref 4.6–6.2)
SAPOVIRUS: NOT DETECTED
SHIGELLA SP+EIEC IPAH STL QL NAA+PROBE: NOT DETECTED
SODIUM SERPL-SCNC: 135 MMOL/L (ref 136–145)
VIBRIO: NOT DETECTED
WBC # BLD AUTO: 13.67 K/UL (ref 3.9–12.7)

## 2025-01-30 PROCEDURE — 87507 IADNA-DNA/RNA PROBE TQ 12-25: CPT | Performed by: STUDENT IN AN ORGANIZED HEALTH CARE EDUCATION/TRAINING PROGRAM

## 2025-01-30 PROCEDURE — 94761 N-INVAS EAR/PLS OXIMETRY MLT: CPT

## 2025-01-30 PROCEDURE — 25000003 PHARM REV CODE 250: Performed by: STUDENT IN AN ORGANIZED HEALTH CARE EDUCATION/TRAINING PROGRAM

## 2025-01-30 PROCEDURE — 36415 COLL VENOUS BLD VENIPUNCTURE: CPT | Performed by: NURSE PRACTITIONER

## 2025-01-30 PROCEDURE — 25000003 PHARM REV CODE 250: Performed by: NURSE PRACTITIONER

## 2025-01-30 PROCEDURE — 63600175 PHARM REV CODE 636 W HCPCS: Mod: JZ | Performed by: NURSE PRACTITIONER

## 2025-01-30 PROCEDURE — 85025 COMPLETE CBC W/AUTO DIFF WBC: CPT | Performed by: NURSE PRACTITIONER

## 2025-01-30 PROCEDURE — 25000003 PHARM REV CODE 250: Performed by: INTERNAL MEDICINE

## 2025-01-30 PROCEDURE — 63600175 PHARM REV CODE 636 W HCPCS: Performed by: STUDENT IN AN ORGANIZED HEALTH CARE EDUCATION/TRAINING PROGRAM

## 2025-01-30 PROCEDURE — 83735 ASSAY OF MAGNESIUM: CPT | Performed by: NURSE PRACTITIONER

## 2025-01-30 PROCEDURE — 27000207 HC ISOLATION

## 2025-01-30 PROCEDURE — 83993 ASSAY FOR CALPROTECTIN FECAL: CPT | Performed by: NURSE PRACTITIONER

## 2025-01-30 PROCEDURE — 63600175 PHARM REV CODE 636 W HCPCS: Performed by: NURSE PRACTITIONER

## 2025-01-30 PROCEDURE — 21400001 HC TELEMETRY ROOM

## 2025-01-30 PROCEDURE — 80053 COMPREHEN METABOLIC PANEL: CPT | Performed by: NURSE PRACTITIONER

## 2025-01-30 RX ADMIN — OXYCODONE HYDROCHLORIDE AND ACETAMINOPHEN 1 TABLET: 10; 325 TABLET ORAL at 08:01

## 2025-01-30 RX ADMIN — DIPHENHYDRAMINE HYDROCHLORIDE 25 MG: 25 CAPSULE ORAL at 01:01

## 2025-01-30 RX ADMIN — Medication 6 MG: at 11:01

## 2025-01-30 RX ADMIN — SODIUM CHLORIDE: 9 INJECTION, SOLUTION INTRAVENOUS at 09:01

## 2025-01-30 RX ADMIN — ONDANSETRON 4 MG: 2 INJECTION INTRAMUSCULAR; INTRAVENOUS at 01:01

## 2025-01-30 RX ADMIN — MORPHINE SULFATE 4 MG: 4 INJECTION, SOLUTION INTRAMUSCULAR; INTRAVENOUS at 11:01

## 2025-01-30 RX ADMIN — BUSPIRONE HYDROCHLORIDE 15 MG: 5 TABLET ORAL at 09:01

## 2025-01-30 RX ADMIN — METRONIDAZOLE 500 MG: 250 TABLET ORAL at 09:01

## 2025-01-30 RX ADMIN — DIPHENHYDRAMINE HYDROCHLORIDE 25 MG: 25 CAPSULE ORAL at 12:01

## 2025-01-30 RX ADMIN — ONDANSETRON 4 MG: 2 INJECTION INTRAMUSCULAR; INTRAVENOUS at 09:01

## 2025-01-30 RX ADMIN — OXYCODONE HYDROCHLORIDE AND ACETAMINOPHEN 1 TABLET: 10; 325 TABLET ORAL at 07:01

## 2025-01-30 RX ADMIN — ESCITALOPRAM OXALATE 20 MG: 10 TABLET ORAL at 08:01

## 2025-01-30 RX ADMIN — BUSPIRONE HYDROCHLORIDE 15 MG: 5 TABLET ORAL at 02:01

## 2025-01-30 RX ADMIN — OXYCODONE HYDROCHLORIDE AND ACETAMINOPHEN 1 TABLET: 10; 325 TABLET ORAL at 11:01

## 2025-01-30 RX ADMIN — Medication 6 MG: at 12:01

## 2025-01-30 RX ADMIN — DIPHENHYDRAMINE HYDROCHLORIDE 25 MG: 25 CAPSULE ORAL at 09:01

## 2025-01-30 RX ADMIN — MORPHINE SULFATE 4 MG: 4 INJECTION, SOLUTION INTRAMUSCULAR; INTRAVENOUS at 04:01

## 2025-01-30 RX ADMIN — METHYLPREDNISOLONE SODIUM SUCCINATE 30 MG: 40 INJECTION, POWDER, FOR SOLUTION INTRAMUSCULAR; INTRAVENOUS at 06:01

## 2025-01-30 RX ADMIN — OXYCODONE HYDROCHLORIDE AND ACETAMINOPHEN 1 TABLET: 10; 325 TABLET ORAL at 02:01

## 2025-01-30 RX ADMIN — SODIUM CHLORIDE: 9 INJECTION, SOLUTION INTRAVENOUS at 07:01

## 2025-01-30 RX ADMIN — RIVAROXABAN 20 MG: 10 TABLET, FILM COATED ORAL at 04:01

## 2025-01-30 RX ADMIN — Medication 1 TABLET: at 04:01

## 2025-01-30 RX ADMIN — Medication 1 TABLET: at 11:01

## 2025-01-30 RX ADMIN — ZOLPIDEM TARTRATE 10 MG: 5 TABLET, COATED ORAL at 10:01

## 2025-01-30 RX ADMIN — BUSPIRONE HYDROCHLORIDE 15 MG: 5 TABLET ORAL at 08:01

## 2025-01-30 RX ADMIN — METHYLPREDNISOLONE SODIUM SUCCINATE 30 MG: 40 INJECTION, POWDER, FOR SOLUTION INTRAMUSCULAR; INTRAVENOUS at 04:01

## 2025-01-30 RX ADMIN — METRONIDAZOLE 500 MG: 250 TABLET ORAL at 06:01

## 2025-01-30 RX ADMIN — METRONIDAZOLE 500 MG: 250 TABLET ORAL at 01:01

## 2025-01-30 RX ADMIN — MORPHINE SULFATE 4 MG: 4 INJECTION, SOLUTION INTRAMUSCULAR; INTRAVENOUS at 01:01

## 2025-01-30 RX ADMIN — METOPROLOL SUCCINATE 50 MG: 50 TABLET, FILM COATED, EXTENDED RELEASE ORAL at 08:01

## 2025-01-30 RX ADMIN — PROMETHAZINE HYDROCHLORIDE 25 MG: 25 TABLET ORAL at 08:01

## 2025-01-30 RX ADMIN — ATORVASTATIN CALCIUM 10 MG: 10 TABLET, FILM COATED ORAL at 09:01

## 2025-01-30 RX ADMIN — MORPHINE SULFATE 4 MG: 4 INJECTION, SOLUTION INTRAMUSCULAR; INTRAVENOUS at 06:01

## 2025-01-30 RX ADMIN — Medication 1 TABLET: at 08:01

## 2025-01-30 RX ADMIN — COLCHICINE 0.6 MG: 0.6 TABLET ORAL at 08:01

## 2025-01-30 RX ADMIN — MORPHINE SULFATE 4 MG: 4 INJECTION, SOLUTION INTRAMUSCULAR; INTRAVENOUS at 09:01

## 2025-01-30 NOTE — ASSESSMENT & PLAN NOTE
-Continue IV steroids - transition to PO when ok with GI  -IV fluids  -Diet as tolerated  -PRN analgesics and antiemetics  -GI consulted  -Follow up infectious workup including C diff testing

## 2025-01-30 NOTE — PROGRESS NOTES
[facility]  Progress Note    Patient Name: Jacoby Jean-Baptiste  MRN: [id number]  Patient Class: IP- Inpatient   Admission Date: 1/28/2025  Length of Stay: 2 days  Attending Physician: Kasandra Hicks MD  Primary Care Provider: Hunter Augilar III, MD        Subjective     Principal Problem:Exacerbation of ulcerative colitis without complication        HPI:  49 y/o male with pMHx of lymphedema, hypertension, cavitary pneumonia, and ulcerative colitis h/o fecal transplant who presented to the ED with complaints of 2 days of progressively worsening rectal bleeding and vomiting.  Patient reports that he basically lives with the diffuse left lower quadrant pain however since his discharge he has had a decrease in appetite.  Two days ago the patient began with vomiting and worsening rectal bleeding.  ED MD spoke with GI who does not recommend patient receiving any antibiotics at this time due to his history of C difficile colitis and fecal transplant.  On review of GI note patient with an appointment with Jessica Church MD on February 1st.    Triage vitals with pulse 83, /80, temp 98.7°, SpO2 99%.  Blood work performed in the ED with WBC 16.16, hemoglobin 10.7, hematocrit 34.8, alk phos 49, lactate 2.3.  Chest x-ray with no acute pulmonary process.  CTA with findings compatible of acute colitis while degree of wall thickening and surrounding edema is similar to January the extent of involvement has increased slightly now with mild wall thickening of the distal transverse colon and splenic flexure.  No evidence of perforation or pneumatosis.  No contrast extravasation to indicate a site of active gastrointestinal hemorrhage.    Overview/Hospital Course:  Jacoby Jean-Baptiste is a 50 year old male with a past medical history of UC, C diff colitis, DM, HTN, HLD, MDD/YOU, GERD, DVT on Eliquis, and anemia who presented with an acute flare of UC. He is on IV fluids and IV steroids. He is  tolerating a PO diet. GI has been consulted. Calprotectin and Cdiff are pending. GI sent difficid to pharmacy for discharge.     Interval History: He has not had any Bms since admit. Apparently was having 10+ prior to admit. Will need to transition off of IV steroids to PO when ok with GI. Hopefully home tomorrow. Has outpt appt with specialist next week he needs to make.     Review of Systems   Gastrointestinal:  Positive for abdominal pain.     Objective:     Vital Signs (Most Recent):  Temp: 98 °F (36.7 °C) (01/30/25 1110)  Pulse: 64 (01/30/25 1110)  Resp: 18 (01/30/25 1455)  BP: 137/76 (01/30/25 1110)  SpO2: 97 % (01/30/25 1110) Vital Signs (24h Range):  Temp:  [98 °F (36.7 °C)-98.3 °F (36.8 °C)] 98 °F (36.7 °C)  Pulse:  [54-69] 64  Resp:  [17-18] 18  SpO2:  [95 %-100 %] 97 %  BP: (130-148)/(63-98) 137/76     Weight: 133.6 kg (294 lb 8.6 oz)  Body mass index is 49.01 kg/m².    Intake/Output Summary (Last 24 hours) at 1/30/2025 1603  Last data filed at 1/30/2025 1136  Gross per 24 hour   Intake 240 ml   Output 300 ml   Net -60 ml         Physical Exam  Vitals and nursing note reviewed.   Constitutional:       Appearance: He is obese.   HENT:      Head: Normocephalic and atraumatic.      Nose: Nose normal.      Mouth/Throat:      Mouth: Mucous membranes are moist.      Pharynx: Oropharynx is clear.   Eyes:      Extraocular Movements: Extraocular movements intact.      Pupils: Pupils are equal, round, and reactive to light.   Cardiovascular:      Rate and Rhythm: Normal rate and regular rhythm.   Pulmonary:      Effort: Pulmonary effort is normal.      Breath sounds: Normal breath sounds.   Abdominal:      General: Bowel sounds are normal.      Palpations: Abdomen is soft.      Tenderness: There is abdominal tenderness.   Musculoskeletal:         General: Normal range of motion.      Cervical back: Normal range of motion and neck supple.   Skin:     General: Skin is warm and dry.      Capillary Refill: Capillary  refill takes less than 2 seconds.   Neurological:      Mental Status: He is alert and oriented to person, place, and time. Mental status is at baseline.   Psychiatric:         Behavior: Behavior normal.             Significant Labs: All pertinent labs within the past 24 hours have been reviewed.  CBC:   Recent Labs   Lab 01/29/25  0454 01/30/25  0640   WBC 11.50 13.67*   HGB 10.2* 10.2*   HCT 33.1* 33.5*    267     CMP:   Recent Labs   Lab 01/29/25  0454 01/30/25  0640   * 135*   K 4.8 4.8    103   CO2 21* 24   * 122*   BUN 16 18   CREATININE 1.0 1.0   CALCIUM 8.9 8.8   PROT 6.4 6.7   ALBUMIN 3.5 3.5   BILITOT 0.6 0.4   ALKPHOS 52* 49*   AST 13 10   ALT 14 12   ANIONGAP 9 8       Significant Imaging: I have reviewed all pertinent imaging results/findings within the past 24 hours.    X-Ray Chest PA And Lateral    Result Date: 1/28/2025  EXAMINATION: XR CHEST PA AND LATERAL CLINICAL HISTORY: pain;Sepsis; FINDINGS: PA and lateral chest is compared to 01/16/2025 shows normal cardiomediastinal silhouette. Lungs are clear. Pulmonary vasculature is normal. No acute osseous abnormality.     No acute pulmonary process Electronically signed by: Consuelo Shea Date:    01/28/2025 Time:    14:36    CTA Acute GI Bleed, Abdomen and Pelvis    Result Date: 1/28/2025  EXAMINATION: CTA ACUTE GI BLEED, ABDOMEN AND PELVIS CLINICAL HISTORY: GI bleed;GI BLEED; history of ulcerative colitis. COMPARISON: January 16, 2025 FINDINGS: Multiphase pre and post infusion imaging was performed.  CT angiographic protocol was utilized.  100 cc nonionic contrast was administered for the exam. The lung bases are unremarkable. The liver has a normal appearance.  The gallbladder is absent.  The biliary tree is nondilated.  The spleen, pancreas, and adrenal glands are normal.  Bilateral nonobstructing renal calculi are noted, the largest at the lower pole on the left measuring 7 mm.  There is no hydronephrosis.  The abdominal  aorta is normal in caliber with mild atherosclerotic calcification.  There is moderate calcification at the origin of the superior mesenteric artery, with at least mild luminal diameter narrowing. Evaluation of bowel structures reveals abnormal circumferential wall thickening of the colon, with involvement of the distal transverse colon, splenic flexure, descending colon, and rectosigmoid colon.  Wall thickening of the distal transverse colon and splenic flexure is new compared to January 16.  The degree of wall thickening of the descending colon, sigmoid colon, and rectum is unchanged, with stable surrounding edema.  There is no evidence of perforation or pneumatosis.  No contrast extravasation is identified to indicate a site of active gastrointestinal hemorrhage.  There is no free air or free fluid. Images of the pelvis demonstrate a normal appearing urinary bladder.  There is no free fluid or lymphadenopathy.  Rim calcified nodular density is noted anteriorly near midline within the peritoneal cavity, of no current significance. No acute osseous abnormalities are identified.     1. CT findings compatible with acute colitis.  While the degree of wall thickening and surrounding edema is similar to January 16, the extent of involvement has increased slightly, now with mild wall thickening of the distal transverse colon and splenic flexure.  No evidence of perforation or pneumatosis.  No contrast extravasation to indicate a site of active gastrointestinal hemorrhage. 2. Additional observations as above. Electronically signed by: Samuel Boyle Date:    01/28/2025 Time:    13:52        Assessment and Plan     * Exacerbation of ulcerative colitis without complication  -Continue IV steroids - transition to PO when ok with GI  -IV fluids  -Diet as tolerated  -PRN analgesics and antiemetics  -GI consulted  -Follow up infectious workup including C diff testing    HLD (hyperlipidemia)  -Statin      Recurrent  "Clostridioides difficile infection  Gastroenterology aware      Anemia  Chronic. Stable.  -Trend Hgb with CBC    DVT (deep venous thrombosis)  -Continue home Xarelto    Anxiety  -Continue home Buspar and Lexapro      BMI 45.0-49.9, adult  Body mass index is 48.26 kg/m². Morbid obesity complicates all aspects of disease management from diagnostic modalities to treatment.         Type 2 diabetes mellitus without complication, without long-term current use of insulin  Patient's FSGs are controlled on current medication regimen.  Last A1c reviewed-   Lab Results   Component Value Date    HGBA1C 5.8 11/02/2024     Most recent fingerstick glucose reviewed- No results for input(s): "POCTGLUCOSE" in the last 24 hours.    Antihyperglycemics (From admission, onward)      None          Hold Oral hypoglycemics while patient is in the hospital.       History of supraventricular tachycardia  -Continue metoprolol  -Telemetry        VTE Risk Mitigation (From admission, onward)           Ordered     rivaroxaban tablet 20 mg  With dinner         01/28/25 2109     Reason for No Pharmacological VTE Prophylaxis  Once        Question:  Reasons:  Answer:  Already adequately anticoagulated on oral Anticoagulants    01/28/25 1733     IP VTE HIGH RISK PATIENT  Once         01/28/25 1733     Place sequential compression device  Until discontinued         01/28/25 1733                    Discharge Planning   GERARDO: 2/2/2025     Code Status: Full Code   Medical Readiness for Discharge Date:   Discharge Plan A: Home                        Noemy Stevenson NP  Department of Hospital Medicine   [facility]    "

## 2025-01-30 NOTE — NURSING
11:13 Called 3100 to give report.   Spoke with Abigail, Charge Nurse who states the room is not ready even though it shows ready in the computer. She will call me back when it is ready.      14:22 Called to check on room. Abigail will call me back.     15:07 Called for Abigail. Gave report.

## 2025-01-30 NOTE — SUBJECTIVE & OBJECTIVE
Interval History: He has not had any Bms since admit. Apparently was having 10+ prior to admit. Will need to transition off of IV steroids to PO when ok with GI. Hopefully home tomorrow. Has outpt appt with specialist next week he needs to make.     Review of Systems   Gastrointestinal:  Positive for abdominal pain.     Objective:     Vital Signs (Most Recent):  Temp: 98 °F (36.7 °C) (01/30/25 1110)  Pulse: 64 (01/30/25 1110)  Resp: 18 (01/30/25 1455)  BP: 137/76 (01/30/25 1110)  SpO2: 97 % (01/30/25 1110) Vital Signs (24h Range):  Temp:  [98 °F (36.7 °C)-98.3 °F (36.8 °C)] 98 °F (36.7 °C)  Pulse:  [54-69] 64  Resp:  [17-18] 18  SpO2:  [95 %-100 %] 97 %  BP: (130-148)/(63-98) 137/76     Weight: 133.6 kg (294 lb 8.6 oz)  Body mass index is 49.01 kg/m².    Intake/Output Summary (Last 24 hours) at 1/30/2025 1603  Last data filed at 1/30/2025 1136  Gross per 24 hour   Intake 240 ml   Output 300 ml   Net -60 ml         Physical Exam  Vitals and nursing note reviewed.   Constitutional:       Appearance: He is obese.   HENT:      Head: Normocephalic and atraumatic.      Nose: Nose normal.      Mouth/Throat:      Mouth: Mucous membranes are moist.      Pharynx: Oropharynx is clear.   Eyes:      Extraocular Movements: Extraocular movements intact.      Pupils: Pupils are equal, round, and reactive to light.   Cardiovascular:      Rate and Rhythm: Normal rate and regular rhythm.   Pulmonary:      Effort: Pulmonary effort is normal.      Breath sounds: Normal breath sounds.   Abdominal:      General: Bowel sounds are normal.      Palpations: Abdomen is soft.      Tenderness: There is abdominal tenderness.   Musculoskeletal:         General: Normal range of motion.      Cervical back: Normal range of motion and neck supple.   Skin:     General: Skin is warm and dry.      Capillary Refill: Capillary refill takes less than 2 seconds.   Neurological:      Mental Status: He is alert and oriented to person, place, and time. Mental  status is at baseline.   Psychiatric:         Behavior: Behavior normal.             Significant Labs: All pertinent labs within the past 24 hours have been reviewed.  CBC:   Recent Labs   Lab 01/29/25  0454 01/30/25  0640   WBC 11.50 13.67*   HGB 10.2* 10.2*   HCT 33.1* 33.5*    267     CMP:   Recent Labs   Lab 01/29/25  0454 01/30/25  0640   * 135*   K 4.8 4.8    103   CO2 21* 24   * 122*   BUN 16 18   CREATININE 1.0 1.0   CALCIUM 8.9 8.8   PROT 6.4 6.7   ALBUMIN 3.5 3.5   BILITOT 0.6 0.4   ALKPHOS 52* 49*   AST 13 10   ALT 14 12   ANIONGAP 9 8       Significant Imaging: I have reviewed all pertinent imaging results/findings within the past 24 hours.    X-Ray Chest PA And Lateral    Result Date: 1/28/2025  EXAMINATION: XR CHEST PA AND LATERAL CLINICAL HISTORY: pain;Sepsis; FINDINGS: PA and lateral chest is compared to 01/16/2025 shows normal cardiomediastinal silhouette. Lungs are clear. Pulmonary vasculature is normal. No acute osseous abnormality.     No acute pulmonary process Electronically signed by: Consuelo Shea Date:    01/28/2025 Time:    14:36    CTA Acute GI Bleed, Abdomen and Pelvis    Result Date: 1/28/2025  EXAMINATION: CTA ACUTE GI BLEED, ABDOMEN AND PELVIS CLINICAL HISTORY: GI bleed;GI BLEED; history of ulcerative colitis. COMPARISON: January 16, 2025 FINDINGS: Multiphase pre and post infusion imaging was performed.  CT angiographic protocol was utilized.  100 cc nonionic contrast was administered for the exam. The lung bases are unremarkable. The liver has a normal appearance.  The gallbladder is absent.  The biliary tree is nondilated.  The spleen, pancreas, and adrenal glands are normal.  Bilateral nonobstructing renal calculi are noted, the largest at the lower pole on the left measuring 7 mm.  There is no hydronephrosis.  The abdominal aorta is normal in caliber with mild atherosclerotic calcification.  There is moderate calcification at the origin of the superior  mesenteric artery, with at least mild luminal diameter narrowing. Evaluation of bowel structures reveals abnormal circumferential wall thickening of the colon, with involvement of the distal transverse colon, splenic flexure, descending colon, and rectosigmoid colon.  Wall thickening of the distal transverse colon and splenic flexure is new compared to January 16.  The degree of wall thickening of the descending colon, sigmoid colon, and rectum is unchanged, with stable surrounding edema.  There is no evidence of perforation or pneumatosis.  No contrast extravasation is identified to indicate a site of active gastrointestinal hemorrhage.  There is no free air or free fluid. Images of the pelvis demonstrate a normal appearing urinary bladder.  There is no free fluid or lymphadenopathy.  Rim calcified nodular density is noted anteriorly near midline within the peritoneal cavity, of no current significance. No acute osseous abnormalities are identified.     1. CT findings compatible with acute colitis.  While the degree of wall thickening and surrounding edema is similar to January 16, the extent of involvement has increased slightly, now with mild wall thickening of the distal transverse colon and splenic flexure.  No evidence of perforation or pneumatosis.  No contrast extravasation to indicate a site of active gastrointestinal hemorrhage. 2. Additional observations as above. Electronically signed by: Samuel Boyle Date:    01/28/2025 Time:    13:52

## 2025-01-31 ENCOUNTER — TELEPHONE (OUTPATIENT)
Dept: FAMILY MEDICINE | Facility: CLINIC | Age: 50
End: 2025-01-31
Payer: COMMERCIAL

## 2025-01-31 VITALS
TEMPERATURE: 99 F | HEART RATE: 70 BPM | DIASTOLIC BLOOD PRESSURE: 112 MMHG | HEIGHT: 65 IN | RESPIRATION RATE: 18 BRPM | WEIGHT: 294.56 LBS | OXYGEN SATURATION: 96 % | SYSTOLIC BLOOD PRESSURE: 157 MMHG | BODY MASS INDEX: 49.08 KG/M2

## 2025-01-31 LAB
ALBUMIN SERPL BCP-MCNC: 3.3 G/DL (ref 3.5–5.2)
ALP SERPL-CCNC: 43 U/L (ref 55–135)
ALT SERPL W/O P-5'-P-CCNC: 12 U/L (ref 10–44)
ANION GAP SERPL CALC-SCNC: 6 MMOL/L (ref 8–16)
AST SERPL-CCNC: 8 U/L (ref 10–40)
BASOPHILS # BLD AUTO: 0.01 K/UL (ref 0–0.2)
BASOPHILS NFR BLD: 0.1 % (ref 0–1.9)
BILIRUB SERPL-MCNC: 0.3 MG/DL (ref 0.1–1)
BUN SERPL-MCNC: 21 MG/DL (ref 6–20)
CALCIUM SERPL-MCNC: 8.7 MG/DL (ref 8.7–10.5)
CHLORIDE SERPL-SCNC: 104 MMOL/L (ref 95–110)
CO2 SERPL-SCNC: 26 MMOL/L (ref 23–29)
CREAT SERPL-MCNC: 1 MG/DL (ref 0.5–1.4)
DIFFERENTIAL METHOD BLD: ABNORMAL
EOSINOPHIL # BLD AUTO: 0 K/UL (ref 0–0.5)
EOSINOPHIL NFR BLD: 0 % (ref 0–8)
ERYTHROCYTE [DISTWIDTH] IN BLOOD BY AUTOMATED COUNT: 18.7 % (ref 11.5–14.5)
EST. GFR  (NO RACE VARIABLE): >60 ML/MIN/1.73 M^2
GLUCOSE SERPL-MCNC: 133 MG/DL (ref 70–110)
HCT VFR BLD AUTO: 32 % (ref 40–54)
HGB BLD-MCNC: 9.7 G/DL (ref 14–18)
IMM GRANULOCYTES # BLD AUTO: 0.06 K/UL (ref 0–0.04)
IMM GRANULOCYTES NFR BLD AUTO: 0.5 % (ref 0–0.5)
LYMPHOCYTES # BLD AUTO: 0.8 K/UL (ref 1–4.8)
LYMPHOCYTES NFR BLD: 6.6 % (ref 18–48)
MAGNESIUM SERPL-MCNC: 1.7 MG/DL (ref 1.6–2.6)
MCH RBC QN AUTO: 26.4 PG (ref 27–31)
MCHC RBC AUTO-ENTMCNC: 30.3 G/DL (ref 32–36)
MCV RBC AUTO: 87 FL (ref 82–98)
MONOCYTES # BLD AUTO: 0.7 K/UL (ref 0.3–1)
MONOCYTES NFR BLD: 5.6 % (ref 4–15)
NEUTROPHILS # BLD AUTO: 10.3 K/UL (ref 1.8–7.7)
NEUTROPHILS NFR BLD: 87.2 % (ref 38–73)
NRBC BLD-RTO: 0 /100 WBC
PLATELET # BLD AUTO: 248 K/UL (ref 150–450)
PMV BLD AUTO: 11 FL (ref 9.2–12.9)
POTASSIUM SERPL-SCNC: 4.8 MMOL/L (ref 3.5–5.1)
PROT SERPL-MCNC: 5.9 G/DL (ref 6–8.4)
RBC # BLD AUTO: 3.68 M/UL (ref 4.6–6.2)
SODIUM SERPL-SCNC: 136 MMOL/L (ref 136–145)
WBC # BLD AUTO: 11.83 K/UL (ref 3.9–12.7)

## 2025-01-31 PROCEDURE — 94761 N-INVAS EAR/PLS OXIMETRY MLT: CPT

## 2025-01-31 PROCEDURE — 85025 COMPLETE CBC W/AUTO DIFF WBC: CPT | Performed by: NURSE PRACTITIONER

## 2025-01-31 PROCEDURE — 25000003 PHARM REV CODE 250: Performed by: INTERNAL MEDICINE

## 2025-01-31 PROCEDURE — 25000003 PHARM REV CODE 250: Performed by: NURSE PRACTITIONER

## 2025-01-31 PROCEDURE — 83735 ASSAY OF MAGNESIUM: CPT | Performed by: NURSE PRACTITIONER

## 2025-01-31 PROCEDURE — 63600175 PHARM REV CODE 636 W HCPCS: Mod: JZ | Performed by: NURSE PRACTITIONER

## 2025-01-31 PROCEDURE — 80053 COMPREHEN METABOLIC PANEL: CPT | Performed by: NURSE PRACTITIONER

## 2025-01-31 PROCEDURE — 63600175 PHARM REV CODE 636 W HCPCS: Performed by: STUDENT IN AN ORGANIZED HEALTH CARE EDUCATION/TRAINING PROGRAM

## 2025-01-31 PROCEDURE — 36415 COLL VENOUS BLD VENIPUNCTURE: CPT | Performed by: NURSE PRACTITIONER

## 2025-01-31 PROCEDURE — 25000003 PHARM REV CODE 250: Performed by: STUDENT IN AN ORGANIZED HEALTH CARE EDUCATION/TRAINING PROGRAM

## 2025-01-31 RX ORDER — METHYLPREDNISOLONE 4 MG/1
TABLET ORAL
Qty: 40 TABLET | Refills: 0 | Status: SHIPPED | OUTPATIENT
Start: 2025-01-31 | End: 2025-02-15

## 2025-01-31 RX ORDER — METRONIDAZOLE 500 MG/1
500 TABLET ORAL EVERY 8 HOURS
Qty: 15 TABLET | Refills: 0 | Status: SHIPPED | OUTPATIENT
Start: 2025-01-31 | End: 2025-02-05

## 2025-01-31 RX ADMIN — METRONIDAZOLE 500 MG: 250 TABLET ORAL at 02:01

## 2025-01-31 RX ADMIN — SODIUM CHLORIDE: 9 INJECTION, SOLUTION INTRAVENOUS at 05:01

## 2025-01-31 RX ADMIN — ESCITALOPRAM OXALATE 20 MG: 10 TABLET ORAL at 08:01

## 2025-01-31 RX ADMIN — ONDANSETRON 4 MG: 2 INJECTION INTRAMUSCULAR; INTRAVENOUS at 11:01

## 2025-01-31 RX ADMIN — MORPHINE SULFATE 4 MG: 4 INJECTION, SOLUTION INTRAMUSCULAR; INTRAVENOUS at 01:01

## 2025-01-31 RX ADMIN — ONDANSETRON 4 MG: 2 INJECTION INTRAMUSCULAR; INTRAVENOUS at 05:01

## 2025-01-31 RX ADMIN — Medication 1 TABLET: at 08:01

## 2025-01-31 RX ADMIN — METHYLPREDNISOLONE SODIUM SUCCINATE 30 MG: 40 INJECTION, POWDER, FOR SOLUTION INTRAMUSCULAR; INTRAVENOUS at 03:01

## 2025-01-31 RX ADMIN — MORPHINE SULFATE 4 MG: 4 INJECTION, SOLUTION INTRAMUSCULAR; INTRAVENOUS at 08:01

## 2025-01-31 RX ADMIN — BUSPIRONE HYDROCHLORIDE 15 MG: 5 TABLET ORAL at 08:01

## 2025-01-31 RX ADMIN — DIPHENHYDRAMINE HYDROCHLORIDE 25 MG: 25 CAPSULE ORAL at 11:01

## 2025-01-31 RX ADMIN — BUSPIRONE HYDROCHLORIDE 15 MG: 5 TABLET ORAL at 02:01

## 2025-01-31 RX ADMIN — OXYCODONE HYDROCHLORIDE AND ACETAMINOPHEN 1 TABLET: 10; 325 TABLET ORAL at 05:01

## 2025-01-31 RX ADMIN — Medication 1 TABLET: at 11:01

## 2025-01-31 RX ADMIN — OXYCODONE HYDROCHLORIDE AND ACETAMINOPHEN 1 TABLET: 10; 325 TABLET ORAL at 11:01

## 2025-01-31 RX ADMIN — OXYCODONE HYDROCHLORIDE AND ACETAMINOPHEN 1 TABLET: 5; 325 TABLET ORAL at 02:01

## 2025-01-31 RX ADMIN — METRONIDAZOLE 500 MG: 250 TABLET ORAL at 05:01

## 2025-01-31 RX ADMIN — METOPROLOL SUCCINATE 50 MG: 50 TABLET, FILM COATED, EXTENDED RELEASE ORAL at 09:01

## 2025-01-31 NOTE — PLAN OF CARE
Problem: Bariatric Environmental Safety  Goal: Safety Maintained with Care  Outcome: Progressing     Problem: Infection  Goal: Absence of Infection Signs and Symptoms  Outcome: Progressing

## 2025-01-31 NOTE — DISCHARGE SUMMARY
[facility] Medicine  Discharge Summary      Patient Name: Jacoby Jean-Baptiste  MRN: [id number]  DAYSI: [id number]  Patient Class: IP- Inpatient  Admission Date: 1/28/2025  Hospital Length of Stay: 3 days  Discharge Date and Time:  01/31/2025 5:02 PM  Attending Physician: Karyn att. providers found   Discharging Provider: Matteo Melendez DO  Primary Care Provider: Hunter Aguilar III, MD    Primary Care Team: Networked reference to record PCT     HPI:   51 y/o male with pMHx of lymphedema, hypertension, cavitary pneumonia, and ulcerative colitis h/o fecal transplant who presented to the ED with complaints of 2 days of progressively worsening rectal bleeding and vomiting.  Patient reports that he basically lives with the diffuse left lower quadrant pain however since his discharge he has had a decrease in appetite.  Two days ago the patient began with vomiting and worsening rectal bleeding.  ED MD spoke with GI who does not recommend patient receiving any antibiotics at this time due to his history of C difficile colitis and fecal transplant.  On review of GI note patient with an appointment with Jessica Church MD on February 1st.    Triage vitals with pulse 83, /80, temp 98.7°, SpO2 99%.  Blood work performed in the ED with WBC 16.16, hemoglobin 10.7, hematocrit 34.8, alk phos 49, lactate 2.3.  Chest x-ray with no acute pulmonary process.  CTA with findings compatible of acute colitis while degree of wall thickening and surrounding edema is similar to January the extent of involvement has increased slightly now with mild wall thickening of the distal transverse colon and splenic flexure.  No evidence of perforation or pneumatosis.  No contrast extravasation to indicate a site of active gastrointestinal hemorrhage.    * No surgery found *      Hospital Course:   Jacoby Jean-Baptiste is a 50 year old male with a past medical history of UC, C diff colitis, DM, HTN, HLD, MDD/YOU, GERD, DVT on Eliquis,  "and anemia who presented with an acute flare of UC. He is on IV fluids and IV steroids. He is tolerating a PO diet. GI has been consulted. GI sent difficid to pharmacy for discharge.  Patient was discharged home once cleared by GI and given tapering dose of steroid and recommended to follow-up with GI outpatient     Goals of Care Treatment Preferences:  Code Status: Full Code      SDOH Screening:  The patient declined to be screened for utility difficulties, food insecurity, transport difficulties, housing insecurity, and interpersonal safety, so no concerns could be identified this admission.     Consults:   Consults (From admission, onward)          Status Ordering Provider     Inpatient consult to Gastroenterology  Once        Provider:  Shan Jean III, MD    Completed SHANE SINGLETARY     Inpatient consult to Midline team  Once        Provider:  (Not yet assigned)    JOHNY Starkey            * Exacerbation of ulcerative colitis without complication  -Continue IV steroids - transition to PO when ok with GI  -IV fluids  -Diet as tolerated  -PRN analgesics and antiemetics  -GI consulted  -Follow up infectious workup including C diff testing    HLD (hyperlipidemia)  -Statin      Recurrent Clostridioides difficile infection  Gastroenterology aware      Anemia  Chronic. Stable.  -Trend Hgb with CBC    DVT (deep venous thrombosis)  -Continue home Xarelto    Anxiety  -Continue home Buspar and Lexapro      BMI 45.0-49.9, adult  Body mass index is 48.26 kg/m². Morbid obesity complicates all aspects of disease management from diagnostic modalities to treatment.         Type 2 diabetes mellitus without complication, without long-term current use of insulin  Patient's FSGs are controlled on current medication regimen.  Last A1c reviewed-   Lab Results   Component Value Date    HGBA1C 5.8 11/02/2024     Most recent fingerstick glucose reviewed- No results for input(s): "POCTGLUCOSE" in the last " 24 hours.    Antihyperglycemics (From admission, onward)      None          Hold Oral hypoglycemics while patient is in the hospital.       History of supraventricular tachycardia  -Continue metoprolol  -Telemetry        Final Active Diagnoses:    Diagnosis Date Noted POA    PRINCIPAL PROBLEM:  Exacerbation of ulcerative colitis without complication [K51.90] 04/20/2023 Yes    HLD (hyperlipidemia) [E78.5] 01/29/2025 Yes    Anemia [D64.9] 12/23/2024 Yes    DVT (deep venous thrombosis) [I82.409] 12/06/2024 Yes    Anxiety [F41.9] 09/04/2024 Yes    BMI 45.0-49.9, adult [Z68.42] 06/23/2022 Not Applicable    Type 2 diabetes mellitus without complication, without long-term current use of insulin [E11.9] 01/29/2022 Yes    History of supraventricular tachycardia [Z86.79] 02/03/2021 Not Applicable      Problems Resolved During this Admission:       Discharged Condition: fair    Disposition: Home or Self Care    Follow Up:   Follow-up Information       Hunter Aguilar III, MD Follow up.    Specialty: Family Medicine  Why: Office will contact patient to schedule follow up appointment.  Contact information:  [address] [facility]  [address]  [place] [address] [phone]               Odalis Mcacbe MD Follow up on 2/7/2025.    Specialty: Gastroenterology  Why: as previously scheduled.  Contact information:  [address]  [place] [address] [phone]                           Patient Instructions:      Ambulatory referral/consult to Outpatient Case Management   Referral Priority: Routine Referral Type: Consultation   Referral Reason: Specialty Services Required   Number of Visits Requested: 1       Significant Diagnostic Studies: Labs: BMP:   Recent Labs   Lab 01/30/25  0640 01/31/25  0521   * 133*   * 136   K 4.8 4.8    104   CO2 24 26   BUN 18 21*   CREATININE 1.0 1.0   CALCIUM 8.8 8.7   MG 2.0 1.7   , CMP   Recent Labs   Lab 01/30/25  0640 01/31/25  0521   * 136   K 4.8 4.8    104    CO2 24 26   * 133*   BUN 18 21*   CREATININE 1.0 1.0   CALCIUM 8.8 8.7   PROT 6.7 5.9*   ALBUMIN 3.5 3.3*   BILITOT 0.4 0.3   ALKPHOS 49* 43*   AST 10 8*   ALT 12 12   ANIONGAP 8 6*   , and CBC   Recent Labs   Lab 01/30/25  0640 01/31/25  0522   WBC 13.67* 11.83   HGB 10.2* 9.7*   HCT 33.5* 32.0*    248       Pending Diagnostic Studies:       Procedure Component Value Units Date/Time    Calprotectin, Stool [[id number]] Collected: 01/30/25 1639    Order Status: Sent Lab Status: In process Updated: 01/30/25 1649    Specimen: Stool            Medications:  Reconciled Home Medications:      Medication List        START taking these medications      HYDROcodone-acetaminophen 5-325 mg per tablet  Commonly known as: NORCO  Take 1 tablet by mouth four times a day as needed for pain     methylPREDNISolone 2 MG tablet  Commonly known as: MEDROL  Take 8 tablets (16 mg total) by mouth once daily for 5 days, THEN 4 tablets (8 mg total) once daily for 5 days, THEN 2 tablets (4 mg total) once daily for 5 days, THEN 1 tablet (2 mg total) once daily for 5 days.  Start taking on: January 31, 2025     metroNIDAZOLE 500 MG tablet  Commonly known as: FLAGYL  Take 1 tablet (500 mg total) by mouth every 8 (eight) hours. for 5 days            CONTINUE taking these medications      budesonide 3 mg capsule  Commonly known as: ENTOCORT EC  Take 3 capsules (9 mg total) by mouth once daily.     busPIRone 15 MG tablet  Commonly known as: BUSPAR  Take 1 tablet (15 mg total) by mouth 3 (three) times daily.     DIFICID 200 mg Tab  Generic drug: fidaxomicin  Take 1 tablet by mouth twice a day for 10 days     EScitalopram oxalate 20 MG tablet  Commonly known as: LEXAPRO  Take 1 tablet (20 mg total) by mouth once daily.     hyoscyamine 0.375 mg Tb12  Commonly known as: Levbid  Take 0.375 mg by mouth 2 (two) times daily.     LORazepam 0.5 MG tablet  Commonly known as: ATIVAN  Take 1 tablet (0.5 mg total) by mouth every 6 (six) hours as  needed for Anxiety.     magnesium oxide 400 mg (241.3 mg magnesium) tablet  Commonly known as: MAG-OX  Take 1 tablet (400 mg total) by mouth 2 (two) times daily.     metoprolol succinate 50 MG 24 hr tablet  Commonly known as: TOPROL-XL  Take 1 tablet (50 mg total) by mouth once daily.     pantoprazole 40 MG tablet  Commonly known as: PROTONIX  Take 40 mg by mouth 2 (two) times daily.     promethazine 25 MG tablet  Commonly known as: PHENERGAN  Take 25 mg by mouth 4 (four) times daily as needed for Nausea.     rivaroxaban 20 mg Tab  Commonly known as: XARELTO  Take 1 tablet (20 mg total) by mouth daily with dinner or evening meal.     simvastatin 20 MG tablet  Commonly known as: ZOCOR  Take 1 tablet (20 mg total) by mouth every evening.     VSL#3 112.5 billion cell Cap  Generic drug: Lactobac 2-Bifido 1-S. therm  Take 1 capsule by mouth twice a day     zolpidem 10 mg Tab  Commonly known as: AMBIEN  Take 1 tablet (10 mg total) by mouth nightly as needed (insomnia).            STOP taking these medications      colchicine 0.6 mg tablet  Commonly known as: COLCRYS            ASK your doctor about these medications      ZINPLAVA 25 mg/mL Soln injection  Generic drug: bezlotoxumab  Inject 10 mg/kg intravenously single dose, during antibacterial treatment              Indwelling Lines/Drains at time of discharge:   Lines/Drains/Airways       None                   Time spent on the discharge of patient: 32 minutes         Matteo Melendez DO  Department of Hospital Medicine  [facility]

## 2025-01-31 NOTE — PLAN OF CARE
Hospital fu scheduled and added to AVS   01/31/25 8347   Post-Acute Status   Hospital Resources/Appts/Education Provided Appointments scheduled and added to AVS

## 2025-01-31 NOTE — PLAN OF CARE
Follow up appts added to AVS .     Contacted pharmacy for medication delivery to bedside . Spoke with patient regarding payment . Patient states he will come back to  his meds . Notified pharmacy       01/31/25 4066   Post-Acute Status   Post-Acute Authorization Medications   Medication Status Pending bedside delivery

## 2025-01-31 NOTE — PLAN OF CARE
Pt clear for DC from case management standpoint. Discharging to Home with NN.       01/31/25 1442   Final Note   Assessment Type Final Discharge Note   Anticipated Discharge Disposition Home

## 2025-02-01 ENCOUNTER — NURSE TRIAGE (OUTPATIENT)
Dept: ADMINISTRATIVE | Facility: CLINIC | Age: 50
End: 2025-02-01
Payer: COMMERCIAL

## 2025-02-01 NOTE — TELEPHONE ENCOUNTER
[place] Pt. States that he was discharge from hospital yesterday. States around 1 AM he began having diarrhea with noted blood in stool. Pt reports Hx of ulcerative colitis. Reports having abdominal pain as well. Advised to be seen in ED  Reason for Disposition   [1] SEVERE abdominal pain (e.g., excruciating) AND [2] present > 1 hour    Additional Information   Negative: Shock suspected (e.g., cold/pale/clammy skin, too weak to stand, low BP, rapid pulse)   Negative: Difficult to awaken or acting confused (e.g., disoriented, slurred speech)   Negative: Sounds like a life-threatening emergency to the triager    Protocols used: Diarrhea-A-AH

## 2025-02-02 ENCOUNTER — HOSPITAL ENCOUNTER (OUTPATIENT)
Facility: HOSPITAL | Age: 50
Discharge: HOME | End: 2025-02-03
Attending: EMERGENCY MEDICINE | Admitting: INTERNAL MEDICINE
Payer: COMMERCIAL

## 2025-02-02 DIAGNOSIS — K51.911 ACUTE ULCERATIVE COLITIS WITH RECTAL BLEEDING: ICD-10-CM

## 2025-02-02 DIAGNOSIS — R42 DIZZINESS: ICD-10-CM

## 2025-02-02 DIAGNOSIS — E86.0 DEHYDRATION: Primary | ICD-10-CM

## 2025-02-02 DIAGNOSIS — R10.9 ABDOMINAL PAIN: ICD-10-CM

## 2025-02-02 DIAGNOSIS — R07.9 CHEST PAIN: ICD-10-CM

## 2025-02-02 PROBLEM — D72.829 LEUKOCYTOSIS: Status: ACTIVE | Noted: 2025-02-02

## 2025-02-02 PROBLEM — K52.9 COLITIS: Status: ACTIVE | Noted: 2025-02-02

## 2025-02-02 LAB
ALBUMIN SERPL BCP-MCNC: 3.3 G/DL (ref 3.5–5.2)
ALP SERPL-CCNC: 36 U/L (ref 55–135)
ALT SERPL W/O P-5'-P-CCNC: 19 U/L (ref 10–44)
AMPHET+METHAMPHET UR QL: NEGATIVE
ANION GAP SERPL CALC-SCNC: 9 MMOL/L (ref 8–16)
APTT PPP: 32.3 SEC (ref 21–32)
AST SERPL-CCNC: 18 U/L (ref 10–40)
BACTERIA #/AREA URNS HPF: NORMAL /HPF
BACTERIA BLD CULT: NORMAL
BACTERIA BLD CULT: NORMAL
BARBITURATES UR QL SCN>200 NG/ML: NEGATIVE
BASOPHILS # BLD AUTO: 0.02 K/UL (ref 0–0.2)
BASOPHILS NFR BLD: 0.1 % (ref 0–1.9)
BENZODIAZ UR QL SCN>200 NG/ML: NEGATIVE
BILIRUB SERPL-MCNC: 0.6 MG/DL (ref 0.1–1)
BILIRUB UR QL STRIP: NEGATIVE
BNP SERPL-MCNC: 34 PG/ML (ref 0–99)
BUN SERPL-MCNC: 25 MG/DL (ref 6–20)
BZE UR QL SCN: NEGATIVE
CALCIUM SERPL-MCNC: 8.4 MG/DL (ref 8.7–10.5)
CANNABINOIDS UR QL SCN: NEGATIVE
CHLORIDE SERPL-SCNC: 107 MMOL/L (ref 95–110)
CK SERPL-CCNC: 33 U/L (ref 20–200)
CLARITY UR: CLEAR
CO2 SERPL-SCNC: 25 MMOL/L (ref 23–29)
COLOR UR: YELLOW
CREAT SERPL-MCNC: 1.4 MG/DL (ref 0.5–1.4)
CREAT UR-MCNC: 112.2 MG/DL (ref 23–375)
DIFFERENTIAL METHOD BLD: ABNORMAL
EOSINOPHIL # BLD AUTO: 0 K/UL (ref 0–0.5)
EOSINOPHIL NFR BLD: 0.1 % (ref 0–8)
ERYTHROCYTE [DISTWIDTH] IN BLOOD BY AUTOMATED COUNT: 19.9 % (ref 11.5–14.5)
EST. GFR  (NO RACE VARIABLE): >60 ML/MIN/1.73 M^2
GLUCOSE SERPL-MCNC: 108 MG/DL (ref 70–110)
GLUCOSE UR QL STRIP: NEGATIVE
HCT VFR BLD AUTO: 38.6 % (ref 40–54)
HGB BLD-MCNC: 12.1 G/DL (ref 14–18)
HGB UR QL STRIP: ABNORMAL
HYALINE CASTS #/AREA URNS LPF: 0 /LPF
IMM GRANULOCYTES # BLD AUTO: 0.1 K/UL (ref 0–0.04)
IMM GRANULOCYTES NFR BLD AUTO: 0.5 % (ref 0–0.5)
INFLUENZA A, MOLECULAR: NEGATIVE
INFLUENZA B, MOLECULAR: NEGATIVE
INR PPP: 1.4 (ref 0.8–1.2)
KETONES UR QL STRIP: NEGATIVE
LACTATE SERPL-SCNC: 0.9 MMOL/L (ref 0.5–1.9)
LEUKOCYTE ESTERASE UR QL STRIP: NEGATIVE
LIPASE SERPL-CCNC: 11 U/L (ref 4–60)
LYMPHOCYTES # BLD AUTO: 0.9 K/UL (ref 1–4.8)
LYMPHOCYTES NFR BLD: 4.8 % (ref 18–48)
MAGNESIUM SERPL-MCNC: 1.7 MG/DL (ref 1.6–2.6)
MCH RBC QN AUTO: 27.1 PG (ref 27–31)
MCHC RBC AUTO-ENTMCNC: 31.3 G/DL (ref 32–36)
MCV RBC AUTO: 86 FL (ref 82–98)
MICROSCOPIC COMMENT: NORMAL
MONOCYTES # BLD AUTO: 1.7 K/UL (ref 0.3–1)
MONOCYTES NFR BLD: 8.5 % (ref 4–15)
NEUTROPHILS # BLD AUTO: 16.6 K/UL (ref 1.8–7.7)
NEUTROPHILS NFR BLD: 86 % (ref 38–73)
NITRITE UR QL STRIP: NEGATIVE
NRBC BLD-RTO: 0 /100 WBC
OB PNL STL: POSITIVE
OPIATES UR QL SCN: ABNORMAL
PCP UR QL SCN>25 NG/ML: NEGATIVE
PH UR STRIP: 7 [PH] (ref 5–8)
PHOSPHATE SERPL-MCNC: 3.3 MG/DL (ref 2.7–4.5)
PLATELET # BLD AUTO: 256 K/UL (ref 150–450)
PMV BLD AUTO: 11.4 FL (ref 9.2–12.9)
POCT GLUCOSE: 106 MG/DL (ref 70–110)
POCT GLUCOSE: 120 MG/DL (ref 70–110)
POTASSIUM SERPL-SCNC: 3.6 MMOL/L (ref 3.5–5.1)
PROCALCITONIN SERPL IA-MCNC: 0.72 NG/ML (ref 0–0.5)
PROT SERPL-MCNC: 6.3 G/DL (ref 6–8.4)
PROT UR QL STRIP: ABNORMAL
PROTHROMBIN TIME: 15.3 SEC (ref 9–12.5)
RBC # BLD AUTO: 4.47 M/UL (ref 4.6–6.2)
RBC #/AREA URNS HPF: 1 /HPF (ref 0–4)
SODIUM SERPL-SCNC: 141 MMOL/L (ref 136–145)
SP GR UR STRIP: <1.005 (ref 1–1.03)
SPECIMEN SOURCE: NORMAL
SQUAMOUS #/AREA URNS HPF: 1 /HPF
TOXICOLOGY INFORMATION: ABNORMAL
TROPONIN I SERPL HS-MCNC: 6.1 PG/ML (ref 0–14.9)
TROPONIN I SERPL HS-MCNC: 9.4 PG/ML (ref 0–14.9)
URN SPEC COLLECT METH UR: ABNORMAL
UROBILINOGEN UR STRIP-ACNC: NEGATIVE EU/DL
WBC # BLD AUTO: 19.31 K/UL (ref 3.9–12.7)
WBC #/AREA STL HPF: NORMAL /[HPF]
WBC #/AREA URNS HPF: 1 /HPF (ref 0–5)

## 2025-02-02 PROCEDURE — 96375 TX/PRO/DX INJ NEW DRUG ADDON: CPT

## 2025-02-02 PROCEDURE — 96376 TX/PRO/DX INJ SAME DRUG ADON: CPT

## 2025-02-02 PROCEDURE — 87449 NOS EACH ORGANISM AG IA: CPT | Mod: 91 | Performed by: EMERGENCY MEDICINE

## 2025-02-02 PROCEDURE — 96374 THER/PROPH/DIAG INJ IV PUSH: CPT

## 2025-02-02 PROCEDURE — 83735 ASSAY OF MAGNESIUM: CPT | Performed by: NURSE PRACTITIONER

## 2025-02-02 PROCEDURE — 84484 ASSAY OF TROPONIN QUANT: CPT | Performed by: NURSE PRACTITIONER

## 2025-02-02 PROCEDURE — 80307 DRUG TEST PRSMV CHEM ANLYZR: CPT | Performed by: EMERGENCY MEDICINE

## 2025-02-02 PROCEDURE — 89055 LEUKOCYTE ASSESSMENT FECAL: CPT | Performed by: EMERGENCY MEDICINE

## 2025-02-02 PROCEDURE — 82550 ASSAY OF CK (CPK): CPT | Performed by: NURSE PRACTITIONER

## 2025-02-02 PROCEDURE — 80053 COMPREHEN METABOLIC PANEL: CPT | Performed by: NURSE PRACTITIONER

## 2025-02-02 PROCEDURE — 25000003 PHARM REV CODE 250

## 2025-02-02 PROCEDURE — 85730 THROMBOPLASTIN TIME PARTIAL: CPT | Performed by: NURSE PRACTITIONER

## 2025-02-02 PROCEDURE — 84145 PROCALCITONIN (PCT): CPT | Performed by: NURSE PRACTITIONER

## 2025-02-02 PROCEDURE — G0378 HOSPITAL OBSERVATION PER HR: HCPCS

## 2025-02-02 PROCEDURE — 87502 INFLUENZA DNA AMP PROBE: CPT | Performed by: EMERGENCY MEDICINE

## 2025-02-02 PROCEDURE — 96361 HYDRATE IV INFUSION ADD-ON: CPT

## 2025-02-02 PROCEDURE — 63600175 PHARM REV CODE 636 W HCPCS: Performed by: HOSPITALIST

## 2025-02-02 PROCEDURE — 25000003 PHARM REV CODE 250: Performed by: EMERGENCY MEDICINE

## 2025-02-02 PROCEDURE — 83880 ASSAY OF NATRIURETIC PEPTIDE: CPT | Performed by: NURSE PRACTITIONER

## 2025-02-02 PROCEDURE — 84100 ASSAY OF PHOSPHORUS: CPT | Performed by: NURSE PRACTITIONER

## 2025-02-02 PROCEDURE — 63600175 PHARM REV CODE 636 W HCPCS: Mod: JZ,TB | Performed by: INTERNAL MEDICINE

## 2025-02-02 PROCEDURE — 84484 ASSAY OF TROPONIN QUANT: CPT | Mod: 91 | Performed by: EMERGENCY MEDICINE

## 2025-02-02 PROCEDURE — 94799 UNLISTED PULMONARY SVC/PX: CPT

## 2025-02-02 PROCEDURE — 63600175 PHARM REV CODE 636 W HCPCS: Performed by: EMERGENCY MEDICINE

## 2025-02-02 PROCEDURE — 82272 OCCULT BLD FECES 1-3 TESTS: CPT | Performed by: EMERGENCY MEDICINE

## 2025-02-02 PROCEDURE — 83993 ASSAY FOR CALPROTECTIN FECAL: CPT | Performed by: EMERGENCY MEDICINE

## 2025-02-02 PROCEDURE — 25000003 PHARM REV CODE 250: Performed by: INTERNAL MEDICINE

## 2025-02-02 PROCEDURE — 83690 ASSAY OF LIPASE: CPT | Performed by: NURSE PRACTITIONER

## 2025-02-02 PROCEDURE — 93010 ELECTROCARDIOGRAM REPORT: CPT | Mod: ,,, | Performed by: INTERNAL MEDICINE

## 2025-02-02 PROCEDURE — 25000003 PHARM REV CODE 250: Performed by: HOSPITALIST

## 2025-02-02 PROCEDURE — 81001 URINALYSIS AUTO W/SCOPE: CPT | Performed by: EMERGENCY MEDICINE

## 2025-02-02 PROCEDURE — 87046 STOOL CULTR AEROBIC BACT EA: CPT | Mod: 59 | Performed by: EMERGENCY MEDICINE

## 2025-02-02 PROCEDURE — 85025 COMPLETE CBC W/AUTO DIFF WBC: CPT | Performed by: NURSE PRACTITIONER

## 2025-02-02 PROCEDURE — 36415 COLL VENOUS BLD VENIPUNCTURE: CPT | Performed by: NURSE PRACTITIONER

## 2025-02-02 PROCEDURE — 93005 ELECTROCARDIOGRAM TRACING: CPT | Performed by: INTERNAL MEDICINE

## 2025-02-02 PROCEDURE — 87389 HIV-1 AG W/HIV-1&-2 AB AG IA: CPT | Performed by: EMERGENCY MEDICINE

## 2025-02-02 PROCEDURE — 63600175 PHARM REV CODE 636 W HCPCS

## 2025-02-02 PROCEDURE — 25500020 PHARM REV CODE 255: Performed by: EMERGENCY MEDICINE

## 2025-02-02 PROCEDURE — 87040 BLOOD CULTURE FOR BACTERIA: CPT | Mod: 59 | Performed by: EMERGENCY MEDICINE

## 2025-02-02 PROCEDURE — 87177 OVA AND PARASITES SMEARS: CPT | Performed by: EMERGENCY MEDICINE

## 2025-02-02 PROCEDURE — 94761 N-INVAS EAR/PLS OXIMETRY MLT: CPT

## 2025-02-02 PROCEDURE — 87045 FECES CULTURE AEROBIC BACT: CPT | Performed by: EMERGENCY MEDICINE

## 2025-02-02 PROCEDURE — 83605 ASSAY OF LACTIC ACID: CPT | Performed by: EMERGENCY MEDICINE

## 2025-02-02 PROCEDURE — 86803 HEPATITIS C AB TEST: CPT | Performed by: EMERGENCY MEDICINE

## 2025-02-02 PROCEDURE — 85610 PROTHROMBIN TIME: CPT | Performed by: NURSE PRACTITIONER

## 2025-02-02 PROCEDURE — 99285 EMERGENCY DEPT VISIT HI MDM: CPT | Mod: 25

## 2025-02-02 PROCEDURE — 87324 CLOSTRIDIUM AG IA: CPT

## 2025-02-02 RX ORDER — MAGNESIUM SULFATE HEPTAHYDRATE 40 MG/ML
2 INJECTION, SOLUTION INTRAVENOUS
Status: DISCONTINUED | OUTPATIENT
Start: 2025-02-02 | End: 2025-02-03 | Stop reason: HOSPADM

## 2025-02-02 RX ORDER — NALOXONE HCL 0.4 MG/ML
0.02 VIAL (ML) INJECTION
Status: DISCONTINUED | OUTPATIENT
Start: 2025-02-02 | End: 2025-02-03 | Stop reason: HOSPADM

## 2025-02-02 RX ORDER — INSULIN ASPART 100 [IU]/ML
0-5 INJECTION, SOLUTION INTRAVENOUS; SUBCUTANEOUS
Status: DISCONTINUED | OUTPATIENT
Start: 2025-02-02 | End: 2025-02-03 | Stop reason: HOSPADM

## 2025-02-02 RX ORDER — SODIUM,POTASSIUM PHOSPHATES 280-250MG
2 POWDER IN PACKET (EA) ORAL
Status: DISCONTINUED | OUTPATIENT
Start: 2025-02-02 | End: 2025-02-03 | Stop reason: HOSPADM

## 2025-02-02 RX ORDER — HYDROCODONE BITARTRATE AND ACETAMINOPHEN 5; 325 MG/1; MG/1
1 TABLET ORAL EVERY 6 HOURS PRN
Status: DISCONTINUED | OUTPATIENT
Start: 2025-02-02 | End: 2025-02-03 | Stop reason: HOSPADM

## 2025-02-02 RX ORDER — FAMOTIDINE 20 MG/1
20 TABLET, FILM COATED ORAL 2 TIMES DAILY
Status: DISCONTINUED | OUTPATIENT
Start: 2025-02-02 | End: 2025-02-03 | Stop reason: HOSPADM

## 2025-02-02 RX ORDER — SODIUM CHLORIDE, SODIUM LACTATE, POTASSIUM CHLORIDE, CALCIUM CHLORIDE 600; 310; 30; 20 MG/100ML; MG/100ML; MG/100ML; MG/100ML
INJECTION, SOLUTION INTRAVENOUS CONTINUOUS
Status: DISCONTINUED | OUTPATIENT
Start: 2025-02-02 | End: 2025-02-03

## 2025-02-02 RX ORDER — TALC
6 POWDER (GRAM) TOPICAL NIGHTLY PRN
Status: DISCONTINUED | OUTPATIENT
Start: 2025-02-02 | End: 2025-02-03 | Stop reason: HOSPADM

## 2025-02-02 RX ORDER — ACETAMINOPHEN 325 MG/1
650 TABLET ORAL EVERY 4 HOURS PRN
Status: DISCONTINUED | OUTPATIENT
Start: 2025-02-02 | End: 2025-02-03 | Stop reason: HOSPADM

## 2025-02-02 RX ORDER — IBUPROFEN 200 MG
16 TABLET ORAL
Status: DISCONTINUED | OUTPATIENT
Start: 2025-02-02 | End: 2025-02-03 | Stop reason: HOSPADM

## 2025-02-02 RX ORDER — LORAZEPAM 0.5 MG/1
0.5 TABLET ORAL EVERY 6 HOURS PRN
Status: DISCONTINUED | OUTPATIENT
Start: 2025-02-02 | End: 2025-02-03 | Stop reason: HOSPADM

## 2025-02-02 RX ORDER — BUSPIRONE HYDROCHLORIDE 5 MG/1
15 TABLET ORAL 3 TIMES DAILY
Status: DISCONTINUED | OUTPATIENT
Start: 2025-02-02 | End: 2025-02-03 | Stop reason: HOSPADM

## 2025-02-02 RX ORDER — METRONIDAZOLE 500 MG/100ML
500 INJECTION, SOLUTION INTRAVENOUS ONCE
Status: DISCONTINUED | OUTPATIENT
Start: 2025-02-02 | End: 2025-02-02

## 2025-02-02 RX ORDER — ONDANSETRON HYDROCHLORIDE 2 MG/ML
4 INJECTION, SOLUTION INTRAVENOUS EVERY 6 HOURS PRN
Status: DISCONTINUED | OUTPATIENT
Start: 2025-02-02 | End: 2025-02-03 | Stop reason: HOSPADM

## 2025-02-02 RX ORDER — SODIUM CHLORIDE 0.9 % (FLUSH) 0.9 %
10 SYRINGE (ML) INJECTION EVERY 12 HOURS PRN
Status: DISCONTINUED | OUTPATIENT
Start: 2025-02-02 | End: 2025-02-03 | Stop reason: HOSPADM

## 2025-02-02 RX ORDER — ALUMINUM HYDROXIDE, MAGNESIUM HYDROXIDE, AND SIMETHICONE 1200; 120; 1200 MG/30ML; MG/30ML; MG/30ML
30 SUSPENSION ORAL 4 TIMES DAILY PRN
Status: DISCONTINUED | OUTPATIENT
Start: 2025-02-02 | End: 2025-02-03 | Stop reason: HOSPADM

## 2025-02-02 RX ORDER — IBUPROFEN 200 MG
24 TABLET ORAL
Status: DISCONTINUED | OUTPATIENT
Start: 2025-02-02 | End: 2025-02-03 | Stop reason: HOSPADM

## 2025-02-02 RX ORDER — DIPHENHYDRAMINE HYDROCHLORIDE 50 MG/ML
25 INJECTION INTRAMUSCULAR; INTRAVENOUS ONCE
Status: COMPLETED | OUTPATIENT
Start: 2025-02-02 | End: 2025-02-02

## 2025-02-02 RX ORDER — GLUCAGON 1 MG
1 KIT INJECTION
Status: DISCONTINUED | OUTPATIENT
Start: 2025-02-02 | End: 2025-02-03 | Stop reason: HOSPADM

## 2025-02-02 RX ORDER — MORPHINE SULFATE 4 MG/ML
4 INJECTION, SOLUTION INTRAMUSCULAR; INTRAVENOUS EVERY 4 HOURS PRN
Status: DISCONTINUED | OUTPATIENT
Start: 2025-02-02 | End: 2025-02-03

## 2025-02-02 RX ORDER — ZOLPIDEM TARTRATE 5 MG/1
10 TABLET ORAL ONCE
Status: COMPLETED | OUTPATIENT
Start: 2025-02-02 | End: 2025-02-02

## 2025-02-02 RX ORDER — ONDANSETRON HYDROCHLORIDE 2 MG/ML
4 INJECTION, SOLUTION INTRAVENOUS ONCE
Status: COMPLETED | OUTPATIENT
Start: 2025-02-02 | End: 2025-02-02

## 2025-02-02 RX ORDER — MORPHINE SULFATE 2 MG/ML
2 INJECTION, SOLUTION INTRAMUSCULAR; INTRAVENOUS
Status: COMPLETED | OUTPATIENT
Start: 2025-02-02 | End: 2025-02-02

## 2025-02-02 RX ADMIN — ONDANSETRON 4 MG: 2 INJECTION INTRAMUSCULAR; INTRAVENOUS at 10:02

## 2025-02-02 RX ADMIN — SODIUM CHLORIDE, POTASSIUM CHLORIDE, SODIUM LACTATE AND CALCIUM CHLORIDE: 600; 310; 30; 20 INJECTION, SOLUTION INTRAVENOUS at 09:02

## 2025-02-02 RX ADMIN — ZOLPIDEM TARTRATE 10 MG: 5 TABLET, FILM COATED ORAL at 09:02

## 2025-02-02 RX ADMIN — MORPHINE SULFATE 2 MG: 2 INJECTION, SOLUTION INTRAMUSCULAR; INTRAVENOUS at 04:02

## 2025-02-02 RX ADMIN — IOHEXOL 100 ML: 350 INJECTION, SOLUTION INTRAVENOUS at 03:02

## 2025-02-02 RX ADMIN — MORPHINE SULFATE 4 MG: 4 INJECTION, SOLUTION INTRAMUSCULAR; INTRAVENOUS at 07:02

## 2025-02-02 RX ADMIN — DIPHENHYDRAMINE HYDROCHLORIDE 25 MG: 50 INJECTION INTRAMUSCULAR; INTRAVENOUS at 09:02

## 2025-02-02 RX ADMIN — Medication 6 MG: at 09:02

## 2025-02-02 RX ADMIN — SODIUM CHLORIDE 500 ML: 9 INJECTION, SOLUTION INTRAVENOUS at 04:02

## 2025-02-02 RX ADMIN — METHYLPREDNISOLONE SODIUM SUCCINATE 60 MG: 40 INJECTION, POWDER, FOR SOLUTION INTRAMUSCULAR; INTRAVENOUS at 05:02

## 2025-02-02 RX ADMIN — HYDROCODONE BITARTRATE AND ACETAMINOPHEN 1 TABLET: 5; 325 TABLET ORAL at 09:02

## 2025-02-02 RX ADMIN — LORAZEPAM 0.5 MG: 0.5 TABLET ORAL at 09:02

## 2025-02-02 RX ADMIN — FAMOTIDINE 20 MG: 20 TABLET, FILM COATED ORAL at 09:02

## 2025-02-02 RX ADMIN — ONDANSETRON 4 MG: 2 INJECTION INTRAMUSCULAR; INTRAVENOUS at 04:02

## 2025-02-02 RX ADMIN — METHYLPREDNISOLONE SODIUM SUCCINATE 40 MG: 40 INJECTION, POWDER, FOR SOLUTION INTRAMUSCULAR; INTRAVENOUS at 09:02

## 2025-02-02 RX ADMIN — BUSPIRONE HYDROCHLORIDE 15 MG: 5 TABLET ORAL at 09:02

## 2025-02-02 NOTE — FIRST PROVIDER EVALUATION
Emergency Department TeleTriage Encounter Note      CHIEF COMPLAINT    Chief Complaint   Patient presents with    Abdominal Pain     Pt discharged on Friday after being admitted for 3 days for ulcerative colitis. Pt states he started having vomiting and diarrhea that night w/ abdominal pain. Pt feeling light headed and dehydrated.       VITAL SIGNS   Initial Vitals [02/02/25 1205]   BP Pulse Resp Temp SpO2   (!) 112/52 84 18 98.8 °F (37.1 °C) 96 %      MAP       --            ALLERGIES    Review of patient's allergies indicates:  No Known Allergies    PROVIDER TRIAGE NOTE  Verbal consent for the teletriage evaluation was given by the patient at the start of the evaluation.  All efforts will be made to maintain patient's privacy during the evaluation.      This is a teletriage evaluation of a 50 y.o. male presenting to the ED with c/o abd pain, nausea, vomiting, and diarrhea for 3 days. Reports dizziness with standing that started yesterday. Limited physical exam via telehealth: The patient is awake, alert, answering questions appropriately and is not in respiratory distress.  As the Teletriage provider, I performed an initial assessment and ordered appropriate labs and imaging studies, if any, to facilitate the patient's care once placed in the ED. Once a room is available, care and a full evaluation will be completed by an alternate ED provider.  Any additional orders and the final disposition will be determined by that provider.  All imaging and labs will not be followed-up by the Teletriage Team, including myself.         ORDERS  Labs Reviewed   HEPATITIS C ANTIBODY   HIV 1 / 2 ANTIBODY       ED Orders (720h ago, onward)      Start Ordered     Status Ordering Provider    02/02/25 1256 02/02/25 1255  Diet NPO  Diet effective now         Ordered STEVE CORCORAN    02/02/25 1207 02/02/25 1206  Hepatitis C Antibody  STAT         Ordered KATHY REYES    02/02/25 1207 02/02/25 1206  HIV 1/2 Ag/Ab (4th Gen)  STAT          Ordered NEREYDAALIXKATHY MULLIGAN    Unscheduled 02/02/25 1255  Vital signs  Every 2 hours         Ordered STEVE CORCORAN    Unscheduled 02/02/25 1255  Insert peripheral IV  Once         Ordered NILO, STEVE MULLIGAN    Unscheduled 02/02/25 1255  CBC W/ AUTO DIFFERENTIAL  STAT         Ordered STEVE CORCORAN    Unscheduled 02/02/25 1255  Comp. Metabolic Panel  STAT         Ordered STEVE CORCORAN    Unscheduled 02/02/25 1255  Urinalysis, Reflex to Urine Culture Urine, Clean Catch  STAT         Ordered NILO STEVE MULLIGAN    Unscheduled 02/02/25 1255  EKG 12-lead  Once         Ordered NILO, STEVE MULLIGAN    Unscheduled 02/02/25 1255  POCT glucose  Once         Ordered STEVE CORCORAN              Virtual Visit Note: The provider triage portion of this emergency department evaluation and documentation was performed via [employer], a HIPAA-compliant telemedicine application, in concert with a tele-presenter in the room. A face to face patient evaluation with one of my colleagues will occur once the patient is placed in an emergency department room.      DISCLAIMER: This note was prepared with [employer] voice recognition transcription software. Garbled syntax, mangled pronouns, and other bizarre constructions may be attributed to that software system.

## 2025-02-02 NOTE — ED PROVIDER NOTES
Encounter Date: 2/2/2025       History     Chief Complaint   Patient presents with    Abdominal Pain     Pt discharged on Friday after being admitted for 3 days for ulcerative colitis. Pt states he started having vomiting and diarrhea that night w/ abdominal pain. Pt feeling light headed and dehydrated.     This is a 50 year old male with a past medical history of UC, C diff colitis, DM, HTN, HLD, MDD/YOU, GERD, DVT on Eliquis, and anemia discharged from the hospital 2 days ago for an ulcerative colitis flare who presents back to the emergency room today due to worsening symptoms.  The patient reports worsening lower abdominal pain.  Reports multiple episodes of loose watery stool streaked with blood.  Has had intermittent nausea and vomiting.  Denies fever.  Denies dysuria frequency urgency or decreased urine output.  Has felt lightheaded.  States he has felt this way in the past when he has been dehydrated.  Denies syncope.  Denies chest pain coughing or shortness of breath.  Denies any headache or visual changes.  Denies focal weakness numbness tingling paresthesias.  Denies any other problems or complaints.        Review of patient's allergies indicates:  No Known Allergies  Past Medical History:   Diagnosis Date    C. difficile colitis     Cavitary pneumonia 11/2023    pos aspergillus serology    Diabetes mellitus 01/2022    Hyperlipidemia 2015    Hypertension 2015    Insomnia 2015    Ulcerative colitis      Past Surgical History:   Procedure Laterality Date    BRONCHOSCOPY WITH FLUOROSCOPY Left 11/20/2023    Procedure: BRONCHOSCOPY, WITH FLUOROSCOPY;  Surgeon: Lisa Villarreal MD;  Location: [facility] ENDO;  Service: Pulmonary;  Laterality: Left;    BRONCHOSCOPY WITH FLUOROSCOPY N/A 11/30/2023    Procedure: BRONCHOSCOPY, WITH FLUOROSCOPY;  Surgeon: Lisa Villarreal MD;  Location: [facility] ENDO;  Service: Pulmonary;  Laterality: N/A;    CARDIAC ELECTROPHYSIOLOGY MAPPING AND ABLATION  2017    SVT    CHOLECYSTECTOMY   2011    COLONOSCOPY N/A 5/3/2022    Procedure: COLONOSCOPY;  Surgeon: Shan Jean III, MD;  Location: [facility];  Service: Endoscopy;  Laterality: N/A;    COLONOSCOPY N/A 3/16/2024    Procedure: COLONOSCOPY;  Surgeon: ALEKSANDER Ramos MD;  Location: [facility];  Service: Endoscopy;  Laterality: N/A;    COLONOSCOPY N/A 1/17/2025    Procedure: COLONOSCOPY;  Surgeon: Russ Rowe MD;  Location: [facility];  Service: Endoscopy;  Laterality: N/A;    COLONOSCOPY WITH FECAL MICROBIOTA TRANSFER N/A 3/21/2023    Procedure: COLONOSCOPY, WITH FECAL MICROBIOTA TRANSFER;  Surgeon: David Millan MD;  [facility];  Service: Endoscopy;  Laterality: N/A;    ESOPHAGOGASTRODUODENOSCOPY N/A 4/24/2023    Procedure: EGD (ESOPHAGOGASTRODUODENOSCOPY);  Surgeon: Shan Jean III, MD;  Location: [facility];  Service: Endoscopy;  Laterality: N/A;    ESOPHAGOGASTRODUODENOSCOPY N/A 6/5/2024    Procedure: EGD (ESOPHAGOGASTRODUODENOSCOPY);  Surgeon: Odalis Mccabe MD;  Location: [facility];  Service: Endoscopy;  Laterality: N/A;    FLEXIBLE SIGMOIDOSCOPY N/A 6/5/2024    Procedure: SIGMOIDOSCOPY, FLEXIBLE;  Surgeon: Odalis Mccabe MD;  Location: [facility];  Service: Endoscopy;  Laterality: N/A;    FLEXIBLE SIGMOIDOSCOPY N/A 7/16/2024    Procedure: SIGMOIDOSCOPY, FLEXIBLE;  Surgeon: Shan Jean III, MD;  Location: [facility];  Service: Endoscopy;  Laterality: N/A;    FLEXIBLE SIGMOIDOSCOPY N/A 8/13/2024    Procedure: SIGMOIDOSCOPY, FLEXIBLE;  Surgeon: Shan Jean III, MD;  Location: [facility];  Service: Endoscopy;  Laterality: N/A;    GANGLION CYST EXCISION Left 1992    UMBILICAL HERNIA REPAIR  2011    with mesh     Family History   Problem Relation Name Age of Onset    Asthma Mother       Social History     Tobacco Use    Smoking status: Former     Average packs/day: 1 pack/day for 30.0 years (30.0 ttl pk-yrs)     Types: Cigarettes     Start date: 1993    Smokeless tobacco: Never     Tobacco comments:     Pt states he has stopped smoking with Chantix three weeks ago. 12/1/23   Substance Use Topics    Alcohol use: Yes     Comment: ocass    Drug use: Not Currently     Review of Systems   Constitutional:  Positive for activity change, appetite change, chills and fatigue. Negative for diaphoresis, fever and unexpected weight change.   HENT: Negative.  Negative for congestion, dental problem, ear pain, nosebleeds, rhinorrhea, sinus pain, sore throat, trouble swallowing and voice change.    Eyes: Negative.  Negative for pain and visual disturbance.   Respiratory: Negative.  Negative for cough, chest tightness, shortness of breath and wheezing.    Cardiovascular: Negative.  Negative for chest pain, palpitations and leg swelling.   Gastrointestinal:  Positive for abdominal pain, blood in stool, diarrhea, nausea and vomiting. Negative for constipation.   Endocrine: Negative.    Genitourinary: Negative.  Negative for decreased urine volume, difficulty urinating, dysuria, flank pain, frequency, testicular pain and urgency.   Musculoskeletal: Negative.  Negative for arthralgias, back pain, gait problem, joint swelling, myalgias, neck pain and neck stiffness.   Skin: Negative.  Negative for pallor and rash.   Neurological:  Positive for light-headedness. Negative for syncope, facial asymmetry, speech difficulty, weakness, numbness and headaches.   Hematological: Negative.  Does not bruise/bleed easily.   Psychiatric/Behavioral: Negative.  Negative for confusion.    All other systems reviewed and are negative.      Physical Exam     Initial Vitals [02/02/25 1205]   BP Pulse Resp Temp SpO2   (!) 112/52 84 18 98.8 °F (37.1 °C) 96 %      MAP       --         Physical Exam    Nursing note and vitals reviewed.  Constitutional: He is cooperative. He does not appear ill. No distress.   HENT:   Head: Normocephalic and atraumatic.   Nose: Nose normal. No mucosal edema or rhinorrhea. Mouth/Throat: Uvula is midline,  oropharynx is clear and moist and mucous membranes are normal. No uvula swelling. No oropharyngeal exudate, posterior oropharyngeal edema or posterior oropharyngeal erythema.   Eyes: Conjunctivae, EOM and lids are normal. Pupils are equal, round, and reactive to light. Right eye exhibits no discharge. Left eye exhibits no discharge.   Neck: Trachea normal and phonation normal. Neck supple. No stridor present. No JVD present.   Normal range of motion.   Full passive range of motion without pain.     Cardiovascular:  Normal rate, regular rhythm, normal heart sounds, intact distal pulses and normal pulses.     Exam reveals no gallop, no distant heart sounds, no friction rub and no decreased pulses.       No murmur heard.  Pulmonary/Chest: Effort normal and breath sounds normal. No stridor. No respiratory distress. He has no decreased breath sounds. He has no wheezes. He has no rhonchi. He has no rales.   Abdominal: Abdomen is soft. Bowel sounds are normal. He exhibits no distension, no pulsatile midline mass and no mass. There is generalized abdominal tenderness. No hernia.   Generalized tenderness with no rebound or guarding, no distention.   No right CVA tenderness.  No left CVA tenderness. There is no rebound and no guarding.   Musculoskeletal:         General: No tenderness or edema. Normal range of motion.      Right hand: Normal. Normal capillary refill. Normal pulse.      Left hand: Normal. Normal capillary refill. Normal pulse.      Cervical back: Normal, full passive range of motion without pain, normal range of motion and neck supple. No tenderness or bony tenderness. No pain with movement. Normal range of motion and normal range of motion. Normal.      Thoracic back: Normal. No tenderness or bony tenderness. Normal range of motion.      Lumbar back: Normal. No tenderness or bony tenderness. Normal range of motion.      Right lower leg: No tenderness. No edema.      Left lower leg: No tenderness. No edema.       Right foot: Normal. Normal capillary refill. No swelling. Normal pulse.      Left foot: Normal. Normal capillary refill. No swelling. Normal pulse.      Comments: Pulses are 2+ throughout, cap refill is less than 2 sec throughout, no edema noted, extremities are nontender throughout with full range of motion     Neurological: He is alert and oriented to person, place, and time. He has normal strength. No cranial nerve deficit or sensory deficit. Coordination and gait normal.   No focal deficits   Skin: Skin is warm and dry. Capillary refill takes less than 2 seconds. No petechiae and no rash noted. No cyanosis or erythema. No pallor.   Psychiatric: He has a normal mood and affect. His speech is normal and behavior is normal.         ED Course   Procedures  Labs Reviewed   CBC W/ AUTO DIFFERENTIAL - Abnormal       Result Value    WBC 19.31 (*)     RBC 4.47 (*)     Hemoglobin 12.1 (*)     Hematocrit 38.6 (*)     MCV 86      MCH 27.1      MCHC 31.3 (*)     RDW 19.9 (*)     Platelets 256      MPV 11.4      Immature Granulocytes 0.5      Gran # (ANC) 16.6 (*)     Immature Grans (Abs) 0.10 (*)     Lymph # 0.9 (*)     Mono # 1.7 (*)     Eos # 0.0      Baso # 0.02      nRBC 0      Gran % 86.0 (*)     Lymph % 4.8 (*)     Mono % 8.5      Eosinophil % 0.1      Basophil % 0.1      Differential Method Automated     COMPREHENSIVE METABOLIC PANEL - Abnormal    Sodium 141      Potassium 3.6      Chloride 107      CO2 25      Glucose 108      BUN 25 (*)     Creatinine 1.4      Calcium 8.4 (*)     Total Protein 6.3      Albumin 3.3 (*)     Total Bilirubin 0.6      Alkaline Phosphatase 36 (*)     AST 18      ALT 19      eGFR >60.0      Anion Gap 9     PROCALCITONIN - Abnormal    Procalcitonin 0.723 (*)    APTT - Abnormal    aPTT 32.3 (*)    PROTIME-INR - Abnormal    Prothrombin Time 15.3 (*)     INR 1.4 (*)    DRUG SCREEN PANEL, URINE EMERGENCY - Abnormal    Benzodiazepines Negative      Cocaine (Metab.) Negative      Opiate Scrn,  Ur Presumptive Positive (*)     Barbiturate Screen, Ur Negative      Amphetamine Screen, Ur Negative      THC Negative      Phencyclidine Negative      Creatinine, Urine 112.2      Toxicology Information SEE COMMENT     URINALYSIS, REFLEX TO URINE CULTURE - Abnormal    Specimen UA Urine, Clean Catch      Color, UA Yellow      Appearance, UA Clear      pH, UA 7.0      Specific Gravity, UA <1.005 (*)     Protein, UA 1+ (*)     Glucose, UA Negative      Ketones, UA Negative      Bilirubin (UA) Negative      Occult Blood UA Trace (*)     Nitrite, UA Negative      Urobilinogen, UA Negative      Leukocytes, UA Negative     OCCULT BLOOD X 1, STOOL - Abnormal    Occult Blood Positive (*)    POCT GLUCOSE - Abnormal    POCT Glucose 120 (*)    CULTURE, BLOOD   CULTURE, BLOOD   CULTURE, STOOL   CLOSTRIDIUM DIFFICILE   MAGNESIUM   B-TYPE NATRIURETIC PEPTIDE   LIPASE   CK   APTT   PROTIME-INR   TROPONIN I HIGH SENSITIVITY   TROPONIN I HIGH SENSITIVITY   PROCALCITONIN   LIPASE    Lipase 11     MAGNESIUM    Magnesium 1.7     B-TYPE NATRIURETIC PEPTIDE    BNP 34     PHOSPHORUS   CK    CPK 33     PHOSPHORUS    Phosphorus 3.3     TROPONIN I HIGH SENSITIVITY    Troponin I High Sensitivity 9.4     INFLUENZA A AND B ANTIGEN    Influenza A, Molecular Negative      Influenza B, Molecular Negative      Flu A & B Source Nasal swab     URINALYSIS MICROSCOPIC    RBC, UA 1      WBC, UA 1      Bacteria Rare      Squam Epithel, UA 1      Hyaline Casts, UA 0      Microscopic Comment SEE COMMENT     HEPATITIS C ANTIBODY   HIV 1 / 2 ANTIBODY   TROPONIN I HIGH SENSITIVITY   LACTIC ACID, PLASMA   WBC, STOOL   CALPROTECTIN, STOOL   STOOL EXAM-OVA,CYSTS,PARASITES   POCT GLUCOSE MONITORING CONTINUOUS   POCT LACTATE        ECG Results              EKG 12-lead (In process)        Collection Time Result Time QRS Duration OHS QTC Calculation    02/02/25 13:12:17 02/02/25 16:23:44 72 456                     In process by Interface, Lab In [facility] (02/02/25  16:23:51)                   Narrative:    Test Reason : R10.9,    Vent. Rate :  84 BPM     Atrial Rate :  84 BPM     P-R Int : 126 ms          QRS Dur :  72 ms      QT Int : 386 ms       P-R-T Axes :  49  22  41 degrees    QTcB Int : 456 ms    Sinus rhythm with Premature atrial complexes with Aberrant conduction  Nonspecific T wave abnormality  Abnormal ECG  No previous ECGs available    Referred By: AAAREFERRAL SELF           Confirmed By:                                   Imaging Results              X-Ray Chest PA And Lateral (Final result)  Result time 02/02/25 15:25:07      Final result by Charles Baker DO (02/02/25 15:25:07)                   Impression:      No acute cardiopulmonary process.      Electronically signed by: Charles Baker  Date:    02/02/2025  Time:    15:25               Narrative:    EXAMINATION:  XR CHEST PA AND LATERAL    CLINICAL HISTORY:  abdominal pain;    FINDINGS:  PA and lateral chest without comparisons.  Cardiomediastinal silhouette is within normal limits. Lungs are clear. Pulmonary vasculature is normal. No acute osseous abnormality.                                       CT Abdomen Pelvis With IV Contrast Routine Oral Contrast (Final result)  Result time 02/02/25 15:30:45      Final result by Charles Baker DO (02/02/25 15:30:45)                   Impression:      1. Mild bowel wall thickening of the sigmoid colon with pericolonic fat stranding, felt to reflect colitis.  These findings are similar to previous exam.  2. Multiple nonobstructing left renal calculi.      Electronically signed by: Charles Baker  Date:    02/02/2025  Time:    15:30               Narrative:      CMS MANDATED QUALITY DATA - CT RADIATION - 436    All CT scans at this facility utilize dose modulation, iterative reconstruction, and/or weight based dosing when appropriate to reduce radiation dose to as low as reasonably achievable.    EXAMINATION:  CT ABDOMEN PELVIS WITH IV CONTRAST    CLINICAL  HISTORY:  LLQ abdominal pain;Ulcerative colitis/recent flare/worsening pain;    TECHNIQUE:  CT abdomen and pelvis with IV contrast.  100 mL Omnipaque 350.    COMPARISON:  CT abdomen pelvis 01/28/2025.    FINDINGS:  Base lung bases are clear.  Heart size is normal.    The liver, pancreas, spleen and adrenal glands are unremarkable.  The gallbladder has been removed.  Kidneys are unchanged.  Multiple nonobstructing calculi of the left kidney again observed and are unchanged, largest measuring 6 mm in the upper pole.  There is no hydronephrosis bilaterally.  The ureters are normal caliber.  The bladder is decompressed.  Prostate glands unremarkable.    There is mild bowel wall thickening of the sigmoid colon with mild pericolonic fat stranding.  Large and small bowel are otherwise normal caliber.  The stomach is mostly decompressed and grossly unremarkable.    The abdominal aorta is normal caliber.  No enlarged intra-abdominal lymph nodes.  The ventral abdominal wall is unremarkable.  Degenerative changes of the spine again noted with no acute osseous abnormality.                                       Medications   sodium chloride 0.9% flush 10 mL (has no administration in time range)   melatonin tablet 6 mg (has no administration in time range)   ondansetron injection 4 mg (has no administration in time range)   aluminum-magnesium hydroxide-simethicone 200-200-20 mg/5 mL suspension 30 mL (has no administration in time range)   acetaminophen tablet 650 mg (has no administration in time range)   HYDROcodone-acetaminophen 5-325 mg per tablet 1 tablet (has no administration in time range)   naloxone 0.4 mg/mL injection 0.02 mg (has no administration in time range)   potassium bicarbonate disintegrating tablet 35 mEq (has no administration in time range)   potassium bicarbonate disintegrating tablet 60 mEq (has no administration in time range)   potassium, sodium phosphates 280-160-250 mg packet 2 packet (has no  administration in time range)   potassium, sodium phosphates 280-160-250 mg packet 2 packet (has no administration in time range)   potassium, sodium phosphates 280-160-250 mg packet 2 packet (has no administration in time range)   glucose chewable tablet 16 g (has no administration in time range)   glucose chewable tablet 24 g (has no administration in time range)   dextrose 50% injection 12.5 g (has no administration in time range)   dextrose 50% injection 25 g (has no administration in time range)   glucagon (human recombinant) injection 1 mg (has no administration in time range)   insulin aspart U-100 pen 0-5 Units (has no administration in time range)   lactated ringers infusion (has no administration in time range)   methylPREDNISolone sodium succinate injection 40 mg (has no administration in time range)   potassium bicarbonate disintegrating tablet 50 mEq (has no administration in time range)   magnesium sulfate 2g in water 50mL IVPB (premix) (has no administration in time range)   magnesium sulfate 2g in water 50mL IVPB (premix) (has no administration in time range)   morphine injection 4 mg (has no administration in time range)   ondansetron injection 4 mg (4 mg Intravenous Given 2/2/25 1605)   sodium chloride 0.9% bolus 500 mL 500 mL (500 mLs Intravenous New Bag 2/2/25 1603)   iohexoL (OMNIPAQUE 350) injection 100 mL (100 mLs Intravenous Given 2/2/25 1500)   morphine injection 2 mg (2 mg Intravenous Given 2/2/25 1605)   methylPREDNISolone sodium succinate injection 60 mg (60 mg Intravenous Given 2/2/25 1724)     Medical Decision Making  The patient is currently hemodynamically stable.  He is not exhibiting evidence of sepsis or shock.  Has had recurrent problems with C diff in the past.  Recently admitted for UC exacerbation but re presents now due to increasing diarrhea and increasing symptoms.  No fever.  I have discussed the case with Gastroenterology-- Dr Jean-- who does NOT want to antibiotics  given.  He does recommend supportive care fluids and Solu-Medrol.  White blood cell count is increased however he was recently on steroids.  He is not exhibiting evidence of sepsis or systemic infection.  Admission recommended.  I have discussed the case with the hospitalist provider who has assumed care and will admit.    Amount and/or Complexity of Data Reviewed  Labs: ordered.  Radiology: ordered.    Risk  Prescription drug management.                          Medical Decision Making:   Clinical Tests:   Lab Tests: Ordered and Reviewed  Radiological Study: Ordered and Reviewed  Medical Tests: Ordered and Reviewed             Clinical Impression:  Final diagnoses:  [R42] Dizziness  [R10.9] Abdominal pain  [E86.0] Dehydration (Primary)  [K51.911] Acute ulcerative colitis with rectal bleeding          ED Disposition Condition    Observation                 Maribel Dobson MD  02/02/25 2986

## 2025-02-02 NOTE — ASSESSMENT & PLAN NOTE
Hx UC, c dif s/p fecal transplant, proctocolitis  Blood cx's, daily CBC  IVF  Pain control   I&O  Consult GI  Per GI recs no abx, add steroids

## 2025-02-03 ENCOUNTER — DOCUMENT SCAN (OUTPATIENT)
Dept: HOME HEALTH SERVICES | Facility: HOSPITAL | Age: 50
End: 2025-02-03
Payer: COMMERCIAL

## 2025-02-03 ENCOUNTER — PATIENT OUTREACH (OUTPATIENT)
Dept: ADMINISTRATIVE | Facility: CLINIC | Age: 50
End: 2025-02-03
Payer: COMMERCIAL

## 2025-02-03 VITALS
RESPIRATION RATE: 15 BRPM | OXYGEN SATURATION: 98 % | TEMPERATURE: 98 F | DIASTOLIC BLOOD PRESSURE: 80 MMHG | HEART RATE: 62 BPM | WEIGHT: 275.19 LBS | BODY MASS INDEX: 45.85 KG/M2 | HEIGHT: 65 IN | SYSTOLIC BLOOD PRESSURE: 127 MMHG

## 2025-02-03 LAB
ALBUMIN SERPL BCP-MCNC: 2.8 G/DL (ref 3.5–5.2)
ALP SERPL-CCNC: 30 U/L (ref 55–135)
ALT SERPL W/O P-5'-P-CCNC: 14 U/L (ref 10–44)
ANION GAP SERPL CALC-SCNC: 6 MMOL/L (ref 8–16)
APTT PPP: 28.9 SEC (ref 21–32)
AST SERPL-CCNC: 9 U/L (ref 10–40)
BASOPHILS # BLD AUTO: 0.01 K/UL (ref 0–0.2)
BASOPHILS NFR BLD: 0.1 % (ref 0–1.9)
BILIRUB SERPL-MCNC: 0.4 MG/DL (ref 0.1–1)
BUN SERPL-MCNC: 22 MG/DL (ref 6–20)
C DIFF GDH STL QL: NEGATIVE
C DIFF TOX A+B STL QL IA: NEGATIVE
CALCIUM SERPL-MCNC: 8.3 MG/DL (ref 8.7–10.5)
CHLORIDE SERPL-SCNC: 107 MMOL/L (ref 95–110)
CO2 SERPL-SCNC: 24 MMOL/L (ref 23–29)
CREAT SERPL-MCNC: 0.9 MG/DL (ref 0.5–1.4)
DIFFERENTIAL METHOD BLD: ABNORMAL
EOSINOPHIL # BLD AUTO: 0 K/UL (ref 0–0.5)
EOSINOPHIL NFR BLD: 0 % (ref 0–8)
ERYTHROCYTE [DISTWIDTH] IN BLOOD BY AUTOMATED COUNT: 19.3 % (ref 11.5–14.5)
EST. GFR  (NO RACE VARIABLE): >60 ML/MIN/1.73 M^2
GLUCOSE SERPL-MCNC: 133 MG/DL (ref 70–110)
HCT VFR BLD AUTO: 31.5 % (ref 40–54)
HCV AB SERPL QL IA: NEGATIVE
HGB BLD-MCNC: 9.8 G/DL (ref 14–18)
HIV 1+2 AB+HIV1 P24 AG SERPL QL IA: NEGATIVE
IMM GRANULOCYTES # BLD AUTO: 0.03 K/UL (ref 0–0.04)
IMM GRANULOCYTES NFR BLD AUTO: 0.2 % (ref 0–0.5)
INR PPP: 1.1 (ref 0.8–1.2)
LYMPHOCYTES # BLD AUTO: 0.6 K/UL (ref 1–4.8)
LYMPHOCYTES NFR BLD: 4.7 % (ref 18–48)
MAGNESIUM SERPL-MCNC: 1.6 MG/DL (ref 1.6–2.6)
MCH RBC QN AUTO: 26.8 PG (ref 27–31)
MCHC RBC AUTO-ENTMCNC: 31.1 G/DL (ref 32–36)
MCV RBC AUTO: 86 FL (ref 82–98)
MONOCYTES # BLD AUTO: 0.3 K/UL (ref 0.3–1)
MONOCYTES NFR BLD: 2.7 % (ref 4–15)
NEUTROPHILS # BLD AUTO: 11.7 K/UL (ref 1.8–7.7)
NEUTROPHILS NFR BLD: 92.3 % (ref 38–73)
NRBC BLD-RTO: 0 /100 WBC
PLATELET # BLD AUTO: 185 K/UL (ref 150–450)
PLATELET BLD QL SMEAR: ABNORMAL
PMV BLD AUTO: 11 FL (ref 9.2–12.9)
POCT GLUCOSE: 113 MG/DL (ref 70–110)
POCT GLUCOSE: 114 MG/DL (ref 70–110)
POTASSIUM SERPL-SCNC: 4.1 MMOL/L (ref 3.5–5.1)
PROT SERPL-MCNC: 5.2 G/DL (ref 6–8.4)
PROTHROMBIN TIME: 11.9 SEC (ref 9–12.5)
RBC # BLD AUTO: 3.66 M/UL (ref 4.6–6.2)
SODIUM SERPL-SCNC: 137 MMOL/L (ref 136–145)
WBC # BLD AUTO: 12.69 K/UL (ref 3.9–12.7)

## 2025-02-03 PROCEDURE — 96376 TX/PRO/DX INJ SAME DRUG ADON: CPT

## 2025-02-03 PROCEDURE — 25000003 PHARM REV CODE 250: Performed by: INTERNAL MEDICINE

## 2025-02-03 PROCEDURE — 99900031 HC PATIENT EDUCATION (STAT)

## 2025-02-03 PROCEDURE — 94799 UNLISTED PULMONARY SVC/PX: CPT | Mod: XB

## 2025-02-03 PROCEDURE — 85025 COMPLETE CBC W/AUTO DIFF WBC: CPT

## 2025-02-03 PROCEDURE — 63600175 PHARM REV CODE 636 W HCPCS

## 2025-02-03 PROCEDURE — 36415 COLL VENOUS BLD VENIPUNCTURE: CPT

## 2025-02-03 PROCEDURE — 80053 COMPREHEN METABOLIC PANEL: CPT

## 2025-02-03 PROCEDURE — 25000003 PHARM REV CODE 250

## 2025-02-03 PROCEDURE — 96361 HYDRATE IV INFUSION ADD-ON: CPT

## 2025-02-03 PROCEDURE — 94799 UNLISTED PULMONARY SVC/PX: CPT

## 2025-02-03 PROCEDURE — 85730 THROMBOPLASTIN TIME PARTIAL: CPT

## 2025-02-03 PROCEDURE — 83735 ASSAY OF MAGNESIUM: CPT

## 2025-02-03 PROCEDURE — 94761 N-INVAS EAR/PLS OXIMETRY MLT: CPT

## 2025-02-03 PROCEDURE — G0378 HOSPITAL OBSERVATION PER HR: HCPCS

## 2025-02-03 PROCEDURE — 85610 PROTHROMBIN TIME: CPT

## 2025-02-03 PROCEDURE — 63600175 PHARM REV CODE 636 W HCPCS: Mod: JZ,TB | Performed by: INTERNAL MEDICINE

## 2025-02-03 RX ORDER — ESCITALOPRAM OXALATE 10 MG/1
20 TABLET ORAL DAILY
Status: DISCONTINUED | OUTPATIENT
Start: 2025-02-03 | End: 2025-02-03 | Stop reason: HOSPADM

## 2025-02-03 RX ORDER — ATORVASTATIN CALCIUM 10 MG/1
10 TABLET, FILM COATED ORAL NIGHTLY
Status: DISCONTINUED | OUTPATIENT
Start: 2025-02-03 | End: 2025-02-03 | Stop reason: HOSPADM

## 2025-02-03 RX ORDER — DIPHENHYDRAMINE HCL 25 MG
25 CAPSULE ORAL EVERY 6 HOURS PRN
Status: DISCONTINUED | OUTPATIENT
Start: 2025-02-03 | End: 2025-02-03 | Stop reason: HOSPADM

## 2025-02-03 RX ORDER — METRONIDAZOLE 250 MG/1
500 TABLET ORAL EVERY 8 HOURS
Status: DISCONTINUED | OUTPATIENT
Start: 2025-02-03 | End: 2025-02-03

## 2025-02-03 RX ADMIN — FAMOTIDINE 20 MG: 20 TABLET, FILM COATED ORAL at 09:02

## 2025-02-03 RX ADMIN — BUSPIRONE HYDROCHLORIDE 15 MG: 5 TABLET ORAL at 09:02

## 2025-02-03 RX ADMIN — MORPHINE SULFATE 4 MG: 4 INJECTION, SOLUTION INTRAMUSCULAR; INTRAVENOUS at 12:02

## 2025-02-03 RX ADMIN — MORPHINE SULFATE 4 MG: 4 INJECTION, SOLUTION INTRAMUSCULAR; INTRAVENOUS at 09:02

## 2025-02-03 RX ADMIN — MORPHINE SULFATE 4 MG: 4 INJECTION, SOLUTION INTRAMUSCULAR; INTRAVENOUS at 04:02

## 2025-02-03 RX ADMIN — ONDANSETRON 4 MG: 2 INJECTION INTRAMUSCULAR; INTRAVENOUS at 07:02

## 2025-02-03 RX ADMIN — LACTOBACILLUS TAB 1 TABLET: TAB at 12:02

## 2025-02-03 RX ADMIN — METHYLPREDNISOLONE SODIUM SUCCINATE 40 MG: 40 INJECTION, POWDER, FOR SOLUTION INTRAMUSCULAR; INTRAVENOUS at 09:02

## 2025-02-03 RX ADMIN — ESCITALOPRAM OXALATE 20 MG: 10 TABLET ORAL at 12:02

## 2025-02-03 RX ADMIN — DIPHENHYDRAMINE HYDROCHLORIDE 25 MG: 25 CAPSULE ORAL at 09:02

## 2025-02-03 RX ADMIN — SODIUM CHLORIDE, POTASSIUM CHLORIDE, SODIUM LACTATE AND CALCIUM CHLORIDE: 600; 310; 30; 20 INJECTION, SOLUTION INTRAVENOUS at 07:02

## 2025-02-03 RX ADMIN — HYDROCODONE BITARTRATE AND ACETAMINOPHEN 1 TABLET: 5; 325 TABLET ORAL at 07:02

## 2025-02-03 RX ADMIN — ONDANSETRON 4 MG: 2 INJECTION INTRAMUSCULAR; INTRAVENOUS at 02:02

## 2025-02-03 NOTE — PLAN OF CARE
Discharge orders and chart reviewed. No other discharge needs noted at this time. Pt is clear for discharge from case management, after seen by Dr. Phoenix. Pt is discharging to home.    Patient to follow up with PCP and Colorectal surgery as scheduled    Patient to drive himself home     02/03/25 1005   Final Note   Assessment Type Final Discharge Note   Anticipated Discharge Disposition Home   What phone number can be called within the next 1-3 days to see how you are doing after discharge? [phone]   Hospital Resources/Appts/Education Provided Appointments scheduled and added to AVS   Post-Acute Status   Discharge Delays (!) Personal Transportation

## 2025-02-03 NOTE — ASSESSMENT & PLAN NOTE
Anemia is likely due to acute blood loss which was from blood in stool . Most recent hemoglobin and hematocrit are listed below.  Recent Labs     01/31/25  0522 02/02/25  1342   HGB 9.7* 12.1*   HCT 32.0* 38.6*     Plan  - Monitor serial CBC: Daily  - Transfuse PRBC if patient becomes hemodynamically unstable, symptomatic or H/H drops below 7/21.  - Patient has not received any PRBC transfusions this admission  - Patient's anemia is currently stable

## 2025-02-03 NOTE — NURSING
Patient left via walk out. Refused wheelchair. AVS given. All questions answered. No distress noted.

## 2025-02-03 NOTE — ASSESSMENT & PLAN NOTE
Patient's FSGs are controlled on current medication regimen.  Last A1c reviewed-   Lab Results   Component Value Date    HGBA1C 5.8 11/02/2024     Most recent fingerstick glucose reviewed-   Recent Labs   Lab 02/02/25  1342   POCTGLUCOSE 120*     Current correctional scale  Low  Maintain anti-hyperglycemic dose as follows-   Antihyperglycemics (From admission, onward)      Start     Stop Route Frequency Ordered    02/02/25 1903  insulin aspart U-100 pen 0-5 Units         -- SubQ Before meals & nightly PRN 02/02/25 1814          Hold Oral hypoglycemics while patient is in the hospital.

## 2025-02-03 NOTE — HOSPITAL COURSE
Jacoby Jean-Baptiste was closely monitored while in the hospital.  Patient was admitted to Hospital Medicine for acute colitis. CT abdomen/pelvis impression with mild bowel wall thickening of the sigmoid colon with pericolonic fat stranding, felt to reflect colitis, findings are similar to previous exam. Patient recently discharged on 01/31/2025 and advised to take Dificid and steroid taper. Patient had not started medications.  Patient stated that he began to experience nausea and vomiting and decided to come back to the emergency department for further evaluation.  C diff study recollected (positive previous admission). In the ED GI recommended steroids and no antibiotics. Patient was started IV Solu-Medrol.  Patient was able to tolerate oral intake prior to arrival to the hospital.  Patient was able to a bland diet on day of discharge. Patient's hemoglobin remained stable during hospitalization. Patient denied nausea, vomiting, diarrhea, chest pain, or shortness for breath at time of exam.  Patient has been appointment with Colorectal surgery on tomorrow.  Case management followed for discharge planning.    Patient seen and examined.  Patient in no acute distress.  Patient hemodynamically stable.  Electrolytes were placed on day of discharge.  Chart reviewed.  Patient deemed appropriate for discharge.  Patient was educated on discharge planning, he verbalized understanding.  Patient is a follow with colorectal surgery on tomorrow.  Patient is advised to continue medication regimen initiated on previous discharge.  Patient is safely discharged home.

## 2025-02-03 NOTE — ED NOTES
Assumed care of this patient. 8/10 abdominal pain - pain medication administered. Patient denies need for anything at this time.

## 2025-02-03 NOTE — DISCHARGE INSTRUCTIONS
Review attached patient instructions.  Take all medications as prescribed.  Note that Xarelto was placed on hold during hospitalization secondary to possible GI bleed.  Hemoglobin remained stable during hospitalization.  You were previously evaluated by Gastroenterology who recommends continuing medication regimen prescribed on previous discharge.  You may experience slight rectal bleeding secondary to colitis and C diff. signs and symptoms to watch for are listed above inpatient instructions.  Keep follow up appointment with colorectal surgeon.  Stay he will hydrated.  Patient's medications were sent to [facility] pharmacy

## 2025-02-03 NOTE — ASSESSMENT & PLAN NOTE
Hx UC, c dif s/p fecal transplant, proctocolitis  Blood cx's, daily CBC  IVF   Tolerating bland diet  Pain control   I&O  Consult GI  Per GI recs no abx, add steroids

## 2025-02-03 NOTE — HPI
50-year-old male presented to ED for eval of abdominal pain. pMHx UC, C diff s/p fecal transplant, DVT on Xarelto, HTN, HLD, DM, cavitary pneumonia, anemia.  Patient reported he has been having left-sided abdominal pain, with associated nausea, vomiting, diarrhea, and blood-streaked stool.  Denies fever, chills.  Denies hematemesis.  Denies urinary symptoms.  States he feels lightheaded but states this is how he feels usually when he is dehydrated.  Denies syncope.  Denies focal neuro deficits.  Patient does have history of ulcerative colitis on immunosuppressant therapy, proctocolitis, and C diff s/p fecal transplant, he was just admitted to this facility and discharged 01/31/2025, he was not taking dificid or steroids at home.  Patient is on Xarelto outpatient for history of DVT.  In ED today, WBCs 19, procalcitonin 0.72.  Also noted with anemia, which is stable, hemoglobin/hematocrit 12.1/38.6.  Stool occult blood positive.  CT abdomen/pelvis impression with mild bowel wall thickening of the sigmoid colon with pericolonic fat stranding, felt to reflect colitis, findings are similar to previous exam.  ED discussed case with GI, recs steroids and no antibiotics.  Admit to hospital medicine for further eval.

## 2025-02-03 NOTE — DISCHARGE SUMMARY
[facility] Medicine  Discharge Summary      Patient Name: Jacoby Jean-Baptiste  MRN: [id number]  DAYSI: [id number]  Patient Class: OP- Observation  Admission Date: 2/2/2025  Hospital Length of Stay: 0 days  Discharge Date and Time:  02/03/2025 4:15 PM  Attending Physician: Ang Phoenix, *   Discharging Provider: Brandie Herr NP  Primary Care Provider: Hunter Aguilar III, MD    Primary Care Team: Networked reference to record PCT     HPI:   50-year-old male presented to ED for eval of abdominal pain. pMHx UC, C diff s/p fecal transplant, DVT on Xarelto, HTN, HLD, DM, cavitary pneumonia, anemia.  Patient reported he has been having left-sided abdominal pain, with associated nausea, vomiting, diarrhea, and blood-streaked stool.  Denies fever, chills.  Denies hematemesis.  Denies urinary symptoms.  States he feels lightheaded but states this is how he feels usually when he is dehydrated.  Denies syncope.  Denies focal neuro deficits.  Patient does have history of ulcerative colitis on immunosuppressant therapy, proctocolitis, and C diff s/p fecal transplant, he was just admitted to this facility and discharged 01/31/2025, he was not taking dificid or steroids at home.  Patient is on Xarelto outpatient for history of DVT.  In ED today, WBCs 19, procalcitonin 0.72.  Also noted with anemia, which is stable, hemoglobin/hematocrit 12.1/38.6.  Stool occult blood positive.  CT abdomen/pelvis impression with mild bowel wall thickening of the sigmoid colon with pericolonic fat stranding, felt to reflect colitis, findings are similar to previous exam.  ED discussed case with GI, recs steroids and no antibiotics.  Admit to hospital medicine for further eval.    * No surgery found *      Hospital Course:   Jacoby Jean-Baptiste was closely monitored while in the hospital.  Patient was admitted to Hospital Medicine for acute colitis. CT abdomen/pelvis impression with mild bowel wall thickening of the  sigmoid colon with pericolonic fat stranding, felt to reflect colitis, findings are similar to previous exam. Patient recently discharged on 01/31/2025 and advised to take Dificid and steroid taper. Patient had not started medications.  Patient stated that he began to experience nausea and vomiting and decided to come back to the emergency department for further evaluation.  C diff study recollected (positive previous admission). In the ED GI recommended steroids and no antibiotics. Patient was started IV Solu-Medrol.  Patient was able to tolerate oral intake prior to arrival to the hospital.  Patient was able to a bland diet on day of discharge. Patient's hemoglobin remained stable during hospitalization. Patient denied nausea, vomiting, diarrhea, chest pain, or shortness for breath at time of exam.  Patient has been appointment with Colorectal surgery on tomorrow.  Case management followed for discharge planning.    Patient seen and examined.  Patient in no acute distress.  Patient hemodynamically stable.  Electrolytes were placed on day of discharge.  Chart reviewed.  Patient deemed appropriate for discharge.  Patient was educated on discharge planning, he verbalized understanding.  Patient is a follow with colorectal surgery on tomorrow.  Patient is advised to continue medication regimen initiated on previous discharge.  Patient is safely discharged home.       Goals of Care Treatment Preferences:  Code Status: Full Code      SDOH Screening:  The patient was screened for utility difficulties, food insecurity, transport difficulties, housing insecurity, and interpersonal safety and there were no concerns identified this admission.     Consults:   Consults (From admission, onward)          Status Ordering Provider     Inpatient consult to Gastroenterology  Once        Provider:  Shan Jean III, MD    Acknowledged JAI BEARDEN' M            * Colitis  Hx UC, c dif s/p fecal transplant,  proctocolitis  Blood cx's, daily CBC  IVF   Tolerating bland diet  Pain control   I&O  Consult GI  Per GI recs no abx, add steroids    Leukocytosis  See above  Recent steroid use  On immunosuppressant therapy for UC, last dose 12/30/24    Anemia  Anemia is likely due to acute blood loss which was from blood in stool . Most recent hemoglobin and hematocrit are listed below.  Recent Labs     02/02/25  1342 02/03/25  0527   HGB 12.1* 9.8*   HCT 38.6* 31.5*       Plan  - Monitor serial CBC: Daily  - Transfuse PRBC if patient becomes hemodynamically unstable, symptomatic or H/H drops below 7/21.  - Patient has not received any PRBC transfusions this admission  - Patient's anemia is currently stable      History of DVT (deep vein thrombosis)  Holding home xarelto 2/2 active bleeding  Coags      Type 2 diabetes mellitus without complication, without long-term current use of insulin  Patient's FSGs are controlled on current medication regimen.  Last A1c reviewed-   Lab Results   Component Value Date    HGBA1C 5.8 11/02/2024     Most recent fingerstick glucose reviewed-   Recent Labs   Lab 02/02/25  2057 02/03/25  0703 02/03/25  1052   POCTGLUCOSE 106 113* 114*       Current correctional scale  Low  Maintain anti-hyperglycemic dose as follows-   Antihyperglycemics (From admission, onward)      Start     Stop Route Frequency Ordered    02/02/25 1903  insulin aspart U-100 pen 0-5 Units         -- SubQ Before meals & nightly PRN 02/02/25 1814          Hold Oral hypoglycemics while patient is in the hospital.         Final Active Diagnoses:    Diagnosis Date Noted POA    PRINCIPAL PROBLEM:  Colitis [K52.9] 02/02/2025 Yes    Leukocytosis [D72.829] 02/02/2025 Yes    Anemia [D64.9] 12/23/2024 Yes    History of DVT (deep vein thrombosis) [Z86.718] 11/04/2024 Not Applicable    Type 2 diabetes mellitus without complication, without long-term current use of insulin [E11.9] 01/29/2022 Yes      Problems Resolved During this Admission:        Discharged Condition: stable    Disposition: Home or Self Care    Follow Up:   Follow-up Information       Jessica Church MD. Go on 2/4/2025.    Specialty: Colon and Rectal Surgery  Why: follow up as scheduled  Contact information:  [address]  [address]  [place] [address]  [phone]               Hunter Aguilar III, MD Follow up.    Specialty: Family Medicine  Why: follow up as previously scheduled  Contact information:  [address]  [address]  [place] [address]  [phone]                           Patient Instructions:   No discharge procedures on file.    Significant Diagnostic Studies: Labs: CMP   Recent Labs   Lab 02/02/25  1342 02/03/25 0527    137   K 3.6 4.1    107   CO2 25 24    133*   BUN 25* 22*   CREATININE 1.4 0.9   CALCIUM 8.4* 8.3*   PROT 6.3 5.2*   ALBUMIN 3.3* 2.8*   BILITOT 0.6 0.4   ALKPHOS 36* 30*   AST 18 9*   ALT 19 14   ANIONGAP 9 6*   , CBC   Recent Labs   Lab 02/02/25  1342 02/03/25 0527   WBC 19.31* 12.69   HGB 12.1* 9.8*   HCT 38.6* 31.5*    185   , and All labs within the past 24 hours have been reviewed    Pending Diagnostic Studies:       Procedure Component Value Units Date/Time    HIV 1/2 Ag/Ab (4th Gen) [[id number]] Collected: 02/02/25 1342    Order Status: Sent Lab Status: No result     Specimen: Blood     Hepatitis C Antibody [[id number]] Collected: 02/02/25 1342    Order Status: Sent Lab Status: No result     Specimen: Blood            Medications:  Reconciled Home Medications:      Medication List        CONTINUE taking these medications      budesonide 3 mg capsule  Commonly known as: ENTOCORT EC  Take 3 capsules (9 mg total) by mouth once daily.     busPIRone 15 MG tablet  Commonly known as: BUSPAR  Take 1 tablet (15 mg total) by mouth 3 (three) times daily.     DIFICID 200 mg Tab  Generic drug: fidaxomicin  Take 1 tablet by mouth twice a day for 10 days     EScitalopram oxalate 20 MG tablet  Commonly known as:  LEXAPRO  Take 1 tablet (20 mg total) by mouth once daily.     HYDROcodone-acetaminophen 5-325 mg per tablet  Commonly known as: NORCO  Take 1 tablet by mouth four times a day as needed for pain     hyoscyamine 0.375 mg Tb12  Commonly known as: Levbid  Take 0.375 mg by mouth 2 (two) times daily.     LORazepam 0.5 MG tablet  Commonly known as: ATIVAN  Take 1 tablet (0.5 mg total) by mouth every 6 (six) hours as needed for Anxiety.     magnesium oxide 400 mg (241.3 mg magnesium) tablet  Commonly known as: MAG-OX  Take 1 tablet (400 mg total) by mouth 2 (two) times daily.     methylPREDNISolone 2 MG tablet  Commonly known as: MEDROL  Take 8 tablets (16 mg total) by mouth once daily for 5 days, THEN 4 tablets (8 mg total) once daily for 5 days, THEN 2 tablets (4 mg total) once daily for 5 days, THEN 1 tablet (2 mg total) once daily for 5 days.  Start taking on: January 31, 2025     metoprolol succinate 50 MG 24 hr tablet  Commonly known as: TOPROL-XL  Take 1 tablet (50 mg total) by mouth once daily.     metroNIDAZOLE 500 MG tablet  Commonly known as: FLAGYL  Take 1 tablet (500 mg total) by mouth every 8 (eight) hours. for 5 days     pantoprazole 40 MG tablet  Commonly known as: PROTONIX  Take 40 mg by mouth 2 (two) times daily.     promethazine 25 MG tablet  Commonly known as: PHENERGAN  Take 25 mg by mouth 4 (four) times daily as needed for Nausea.     rivaroxaban 20 mg Tab  Commonly known as: XARELTO  Take 1 tablet (20 mg total) by mouth daily with dinner or evening meal.  Notes to patient: This medication was placed on hold during hospitalization secondary to positive occult blood in stool.  It is advised to follow up with prescribing provider prior to resuming this medication.  Hemoglobin remained stable during hospitalization     simvastatin 20 MG tablet  Commonly known as: ZOCOR  Take 1 tablet (20 mg total) by mouth every evening.     VSL#3 112.5 billion cell Cap  Generic drug: Lactobac 2-Bifido 1-S. therm  Take 1  capsule by mouth twice a day     ZINPLAVA 25 mg/mL Soln injection  Generic drug: bezlotoxumab  Inject 10 mg/kg intravenously single dose, during antibacterial treatment     zolpidem 10 mg Tab  Commonly known as: AMBIEN  Take 1 tablet (10 mg total) by mouth nightly as needed (insomnia).              Indwelling Lines/Drains at time of discharge:   Lines/Drains/Airways       None                   Time spent on the discharge of patient: 35 minutes         Brandie Herr NP  Department of Hospital Medicine  [facility]

## 2025-02-03 NOTE — ASSESSMENT & PLAN NOTE
Patient's FSGs are controlled on current medication regimen.  Last A1c reviewed-   Lab Results   Component Value Date    HGBA1C 5.8 11/02/2024     Most recent fingerstick glucose reviewed-   Recent Labs   Lab 02/02/25 2057 02/03/25  0703 02/03/25  1052   POCTGLUCOSE 106 113* 114*       Current correctional scale  Low  Maintain anti-hyperglycemic dose as follows-   Antihyperglycemics (From admission, onward)    Start     Stop Route Frequency Ordered    02/02/25 1903  insulin aspart U-100 pen 0-5 Units         -- SubQ Before meals & nightly PRN 02/02/25 1814        Hold Oral hypoglycemics while patient is in the hospital.

## 2025-02-03 NOTE — H&P
[facility] - Emergency Dept  Hospital Medicine  History & Physical    Patient Name: Jacoby Jean-Baptiste  MRN: [id number]  Patient Class: OP- Observation  Admission Date: 2/2/2025  Attending Physician: Noe Juarez MD   Primary Care Provider: Hunter Aguilar III, MD         Patient information was obtained from patient, past medical records, and ER records.     Subjective:     Principal Problem:Colitis    Chief Complaint:   Chief Complaint   Patient presents with    Abdominal Pain     Pt discharged on Friday after being admitted for 3 days for ulcerative colitis. Pt states he started having vomiting and diarrhea that night w/ abdominal pain. Pt feeling light headed and dehydrated.        HPI: 50-year-old male presented to ED for eval of abdominal pain. pMHx UC, C diff s/p fecal transplant, DVT on Xarelto, HTN, HLD, DM, cavitary pneumonia, anemia.  Patient reported he has been having left-sided abdominal pain, with associated nausea, vomiting, diarrhea, and blood-streaked stool.  Denies fever, chills.  Denies hematemesis.  Denies urinary symptoms.  States he feels lightheaded but states this is how he feels usually when he is dehydrated.  Denies syncope.  Denies focal neuro deficits.  Patient does have history of ulcerative colitis on immunosuppressant therapy, proctocolitis, and C diff s/p fecal transplant, he was just admitted to this facility and discharged 01/31/2025, he was not taking dificid or steroids at home.  Patient is on Xarelto outpatient for history of DVT.  In ED today, WBCs 19, procalcitonin 0.72.  Also noted with anemia, which is stable, hemoglobin/hematocrit 12.1/38.6.  Stool occult blood positive.  CT abdomen/pelvis impression with mild bowel wall thickening of the sigmoid colon with pericolonic fat stranding, felt to reflect colitis, findings are similar to previous exam.  ED discussed case with GI, recs steroids and no antibiotics.  Admit to hospital medicine for further eval.    Past  Medical History:   Diagnosis Date    C. difficile colitis     Cavitary pneumonia 11/2023    pos aspergillus serology    Diabetes mellitus 01/2022    Hyperlipidemia 2015    Hypertension 2015    Insomnia 2015    Ulcerative colitis        Past Surgical History:   Procedure Laterality Date    BRONCHOSCOPY WITH FLUOROSCOPY Left 11/20/2023    Procedure: BRONCHOSCOPY, WITH FLUOROSCOPY;  Surgeon: Lisa Villarreal MD;  Location: [facility];  Service: Pulmonary;  Laterality: Left;    BRONCHOSCOPY WITH FLUOROSCOPY N/A 11/30/2023    Procedure: BRONCHOSCOPY, WITH FLUOROSCOPY;  Surgeon: Lisa Villarreal MD;  Location: [facility];  Service: Pulmonary;  Laterality: N/A;    CARDIAC ELECTROPHYSIOLOGY MAPPING AND ABLATION  2017    SVT    CHOLECYSTECTOMY  2011    COLONOSCOPY N/A 5/3/2022    Procedure: COLONOSCOPY;  Surgeon: Shan Jean III, MD;  Location: [facility];  Service: Endoscopy;  Laterality: N/A;    COLONOSCOPY N/A 3/16/2024    Procedure: COLONOSCOPY;  Surgeon: ALEKSANDER Ramos MD;  Location: [facility];  Service: Endoscopy;  Laterality: N/A;    COLONOSCOPY N/A 1/17/2025    Procedure: COLONOSCOPY;  Surgeon: Russ Rowe MD;  Location: [facility];  Service: Endoscopy;  Laterality: N/A;    COLONOSCOPY WITH FECAL MICROBIOTA TRANSFER N/A 3/21/2023    Procedure: COLONOSCOPY, WITH FECAL MICROBIOTA TRANSFER;  Surgeon: David Millan MD;  Location: [facility];  Service: Endoscopy;  Laterality: N/A;    ESOPHAGOGASTRODUODENOSCOPY N/A 4/24/2023    Procedure: EGD (ESOPHAGOGASTRODUODENOSCOPY);  Surgeon: Shan Jean III, MD;  Location: [facility];  Service: Endoscopy;  Laterality: N/A;    ESOPHAGOGASTRODUODENOSCOPY N/A 6/5/2024    Procedure: EGD (ESOPHAGOGASTRODUODENOSCOPY);  Surgeon: Odalis Mccabe MD;  Location: [facility];  Service: Endoscopy;  Laterality: N/A;    FLEXIBLE SIGMOIDOSCOPY N/A 6/5/2024    Procedure: SIGMOIDOSCOPY, FLEXIBLE;  Surgeon: Odalis Mccabe MD;  Location: [facility];  Service:  Endoscopy;  Laterality: N/A;    FLEXIBLE SIGMOIDOSCOPY N/A 7/16/2024    Procedure: SIGMOIDOSCOPY, FLEXIBLE;  Surgeon: Shan Jean III, MD;  Location: [facility];  Service: Endoscopy;  Laterality: N/A;    FLEXIBLE SIGMOIDOSCOPY N/A 8/13/2024    Procedure: SIGMOIDOSCOPY, FLEXIBLE;  Surgeon: Shan Jean III, MD;  Location: [facility] END[facility];  Service: Endoscopy;  Laterality: N/A;    GANGLION CYST EXCISION Left 1992    UMBILICAL HERNIA REPAIR  2011    with mesh       Review of patient's allergies indicates:  No Known Allergies    Current Facility-Administered Medications on File Prior to Encounter   Medication    lactated ringers infusion     Current Outpatient Medications on File Prior to Encounter   Medication Sig    bezlotoxumab (ZINPLAVA) 25 mg/mL Soln injection Inject 10 mg/kg intravenously single dose, during antibacterial treatment    budesonide (ENTOCORT EC) 3 mg capsule Take 3 capsules (9 mg total) by mouth once daily.    busPIRone (BUSPAR) 15 MG tablet Take 1 tablet (15 mg total) by mouth 3 (three) times daily.    EScitalopram oxalate (LEXAPRO) 20 MG tablet Take 1 tablet (20 mg total) by mouth once daily.    fidaxomicin (DIFICID) 200 mg Tab Take 1 tablet by mouth twice a day for 10 days    HYDROcodone-acetaminophen (NORCO) 5-325 mg per tablet Take 1 tablet by mouth four times a day as needed for pain    hyoscyamine (LEVBID) 0.375 mg Tb12 Take 0.375 mg by mouth 2 (two) times daily.    Lactobac 2-Bifido 1-S. therm (VSL#3) 112.5 billion cell Cap Take 1 capsule by mouth twice a day    LORazepam (ATIVAN) 0.5 MG tablet Take 1 tablet (0.5 mg total) by mouth every 6 (six) hours as needed for Anxiety.    magnesium oxide (MAG-OX) 400 mg (241.3 mg magnesium) tablet Take 1 tablet (400 mg total) by mouth 2 (two) times daily.    methylPREDNISolone (MEDROL) 2 MG tablet Take 8 tablets (16 mg total) by mouth once daily for 5 days, THEN 4 tablets (8 mg total) once daily for 5 days, THEN 2 tablets (4 mg total) once  daily for 5 days, THEN 1 tablet (2 mg total) once daily for 5 days.    metoprolol succinate (TOPROL-XL) 50 MG 24 hr tablet Take 1 tablet (50 mg total) by mouth once daily.    metroNIDAZOLE (FLAGYL) 500 MG tablet Take 1 tablet (500 mg total) by mouth every 8 (eight) hours. for 5 days    pantoprazole (PROTONIX) 40 MG tablet Take 40 mg by mouth 2 (two) times daily.    promethazine (PHENERGAN) 25 MG tablet Take 25 mg by mouth 4 (four) times daily as needed for Nausea.    rivaroxaban (XARELTO) 20 mg Tab Take 1 tablet (20 mg total) by mouth daily with dinner or evening meal.    simvastatin (ZOCOR) 20 MG tablet Take 1 tablet (20 mg total) by mouth every evening.    zolpidem (AMBIEN) 10 mg Tab Take 1 tablet (10 mg total) by mouth nightly as needed (insomnia).     Family History       Problem Relation (Age of Onset)    Asthma Mother          Tobacco Use    Smoking status: Former     Average packs/day: 1 pack/day for 30.0 years (30.0 ttl pk-yrs)     Types: Cigarettes     Start date: 1993    Smokeless tobacco: Never    Tobacco comments:     Pt states he has stopped smoking with Chantix three weeks ago. 12/1/23   Substance and Sexual Activity    Alcohol use: Yes     Comment: ocass    Drug use: Not Currently    Sexual activity: Not Currently     Review of Systems   Constitutional:  Negative for chills and fever.   HENT:  Negative for congestion and sore throat.    Eyes:  Negative for visual disturbance.   Respiratory:  Negative for shortness of breath.    Cardiovascular:  Negative for chest pain.   Gastrointestinal:  Positive for abdominal pain, blood in stool, diarrhea, nausea and vomiting.   Genitourinary:  Negative for flank pain.   Musculoskeletal:  Negative for myalgias.   Neurological:  Negative for syncope.   Psychiatric/Behavioral:  Negative for confusion.      Objective:     Vital Signs (Most Recent):  Temp: 98.8 °F (37.1 °C) (02/02/25 1205)  Pulse: 73 (02/02/25 1731)  Resp: 18 (02/02/25 1605)  BP: 104/64 (02/02/25  "1731)  SpO2: 99 % (02/02/25 1731) Vital Signs (24h Range):  Temp:  [98.8 °F (37.1 °C)] 98.8 °F (37.1 °C)  Pulse:  [71-84] 73  Resp:  [18] 18  SpO2:  [96 %-100 %] 99 %  BP: ()/(52-78) 104/64     Weight: 132.5 kg (292 lb)  Body mass index is 48.59 kg/m².     Physical Exam  Vitals reviewed.   Constitutional:       General: He is not in acute distress.  HENT:      Head: Normocephalic and atraumatic.      Nose: Nose normal.      Mouth/Throat:      Mouth: Mucous membranes are moist.   Eyes:      Conjunctiva/sclera: Conjunctivae normal.   Cardiovascular:      Rate and Rhythm: Normal rate and regular rhythm.   Pulmonary:      Effort: Pulmonary effort is normal. No respiratory distress.      Breath sounds: Normal breath sounds.   Abdominal:      General: Bowel sounds are normal.      Palpations: Abdomen is soft.      Tenderness: There is abdominal tenderness (L abdomen).   Musculoskeletal:         General: Normal range of motion.      Cervical back: Normal range of motion.      Right lower leg: No edema.      Left lower leg: No edema.   Skin:     General: Skin is warm and dry.   Neurological:      Mental Status: He is alert and oriented to person, place, and time.   Psychiatric:         Mood and Affect: Mood normal.                Significant Labs: All pertinent labs within the past 24 hours have been reviewed.  Blood Culture: No results for input(s): "LABBLOO" in the last 48 hours.  CBC:   Recent Labs   Lab 02/02/25  1342   WBC 19.31*   HGB 12.1*   HCT 38.6*        CMP:   Recent Labs   Lab 02/02/25  1342      K 3.6      CO2 25      BUN 25*   CREATININE 1.4   CALCIUM 8.4*   PROT 6.3   ALBUMIN 3.3*   BILITOT 0.6   ALKPHOS 36*   AST 18   ALT 19   ANIONGAP 9     Coagulation:   Recent Labs   Lab 02/02/25  1342   INR 1.4*   APTT 32.3*     Lactic Acid: No results for input(s): "LACTATE" in the last 48 hours.  Magnesium:   Recent Labs   Lab 02/02/25  1342   MG 1.7     TSH:   Recent Labs   Lab " 11/01/24  2204   TSH 0.515     Urine Studies:   Recent Labs   Lab 02/02/25  1640   COLORU Yellow   APPEARANCEUA Clear   PHUR 7.0   SPECGRAV <1.005*   PROTEINUA 1+*   GLUCUA Negative   KETONESU Negative   BILIRUBINUA Negative   OCCULTUA Trace*   NITRITE Negative   UROBILINOGEN Negative   LEUKOCYTESUR Negative   RBCUA 1   WBCUA 1   BACTERIA Rare   SQUAMEPITHEL 1   HYALINECASTS 0       Significant Imaging: I have reviewed all pertinent imaging results/findings within the past 24 hours.  Assessment/Plan:     * Colitis  Hx UC, c dif s/p fecal transplant, proctocolitis  Blood cx's, daily CBC  IVF  Pain control   I&O  Consult GI  Per GI recs no abx, add steroids    Leukocytosis  See above  Recent steroid use  On immunosuppressant therapy for UC, last dose 12/30/24    Anemia  Anemia is likely due to acute blood loss which was from blood in stool . Most recent hemoglobin and hematocrit are listed below.  Recent Labs     01/31/25  0522 02/02/25  1342   HGB 9.7* 12.1*   HCT 32.0* 38.6*     Plan  - Monitor serial CBC: Daily  - Transfuse PRBC if patient becomes hemodynamically unstable, symptomatic or H/H drops below 7/21.  - Patient has not received any PRBC transfusions this admission  - Patient's anemia is currently stable      History of DVT (deep vein thrombosis)  Holding home xarelto 2/2 active bleeding  Coags      Type 2 diabetes mellitus without complication, without long-term current use of insulin  Patient's FSGs are controlled on current medication regimen.  Last A1c reviewed-   Lab Results   Component Value Date    HGBA1C 5.8 11/02/2024     Most recent fingerstick glucose reviewed-   Recent Labs   Lab 02/02/25  1342   POCTGLUCOSE 120*     Current correctional scale  Low  Maintain anti-hyperglycemic dose as follows-   Antihyperglycemics (From admission, onward)      Start     Stop Route Frequency Ordered    02/02/25 1903  insulin aspart U-100 pen 0-5 Units         -- SubQ Before meals & nightly PRN 02/02/25 1814           Hold Oral hypoglycemics while patient is in the hospital.         VTE Risk Mitigation (From admission, onward)           Ordered     Reason for No Pharmacological VTE Prophylaxis  Once        Question:  Reasons:  Answer:  Active Bleeding    02/02/25 1814     IP VTE HIGH RISK PATIENT  Once         02/02/25 1814     Place sequential compression device  Until discontinued         02/02/25 1814                     **Review/resume home meds once reconciled**    On 02/02/2025, patient should be placed in hospital observation services under my care in collaboration with Dr. Juarez.           Larisa Padgett NP  Department of Hospital Medicine  [facility] - Emergency Dept

## 2025-02-03 NOTE — ASSESSMENT & PLAN NOTE
Anemia is likely due to acute blood loss which was from blood in stool . Most recent hemoglobin and hematocrit are listed below.  Recent Labs     02/02/25  1342 02/03/25  0527   HGB 12.1* 9.8*   HCT 38.6* 31.5*       Plan  - Monitor serial CBC: Daily  - Transfuse PRBC if patient becomes hemodynamically unstable, symptomatic or H/H drops below 7/21.  - Patient has not received any PRBC transfusions this admission  - Patient's anemia is currently stable

## 2025-02-03 NOTE — SUBJECTIVE & OBJECTIVE
Past Medical History:   Diagnosis Date    C. difficile colitis     Cavitary pneumonia 11/2023    pos aspergillus serology    Diabetes mellitus 01/2022    Hyperlipidemia 2015    Hypertension 2015    Insomnia 2015    Ulcerative colitis        Past Surgical History:   Procedure Laterality Date    BRONCHOSCOPY WITH FLUOROSCOPY Left 11/20/2023    Procedure: BRONCHOSCOPY, WITH FLUOROSCOPY;  Surgeon: Lisa Villarreal MD;  Location: [facility];  Service: Pulmonary;  Laterality: Left;    BRONCHOSCOPY WITH FLUOROSCOPY N/A 11/30/2023    Procedure: BRONCHOSCOPY, WITH FLUOROSCOPY;  Surgeon: Lisa Villarreal MD;  Location: [facility];  Service: Pulmonary;  Laterality: N/A;    CARDIAC ELECTROPHYSIOLOGY MAPPING AND ABLATION  2017    SVT    CHOLECYSTECTOMY  2011    COLONOSCOPY N/A 5/3/2022    Procedure: COLONOSCOPY;  Surgeon: Shan Jean III, MD;  Location: [facility];  Service: Endoscopy;  Laterality: N/A;    COLONOSCOPY N/A 3/16/2024    Procedure: COLONOSCOPY;  Surgeon: ALEKSANDER Ramos MD;  Location: [facility];  Service: Endoscopy;  Laterality: N/A;    COLONOSCOPY N/A 1/17/2025    Procedure: COLONOSCOPY;  Surgeon: Russ Rowe MD;  Location: [facility];  Service: Endoscopy;  Laterality: N/A;    COLONOSCOPY WITH FECAL MICROBIOTA TRANSFER N/A 3/21/2023    Procedure: COLONOSCOPY, WITH FECAL MICROBIOTA TRANSFER;  Surgeon: David Millan MD;  Location: [facility];  Service: Endoscopy;  Laterality: N/A;    ESOPHAGOGASTRODUODENOSCOPY N/A 4/24/2023    Procedure: EGD (ESOPHAGOGASTRODUODENOSCOPY);  Surgeon: Shan Jean III, MD;  Location: [facility];  Service: Endoscopy;  Laterality: N/A;    ESOPHAGOGASTRODUODENOSCOPY N/A 6/5/2024    Procedure: EGD (ESOPHAGOGASTRODUODENOSCOPY);  Surgeon: Odalis Mccabe MD;  Location: [facility];  Service: Endoscopy;  Laterality: N/A;    FLEXIBLE SIGMOIDOSCOPY N/A 6/5/2024    Procedure: SIGMOIDOSCOPY, FLEXIBLE;  Surgeon: Odalis Mccabe MD;  Location: [facility];  Service:  Endoscopy;  Laterality: N/A;    FLEXIBLE SIGMOIDOSCOPY N/A 7/16/2024    Procedure: SIGMOIDOSCOPY, FLEXIBLE;  Surgeon: Shan Jean III, MD;  Location: [facility];  Service: Endoscopy;  Laterality: N/A;    FLEXIBLE SIGMOIDOSCOPY N/A 8/13/2024    Procedure: SIGMOIDOSCOPY, FLEXIBLE;  Surgeon: Shan Jean III, MD;  Location: [facility];  Service: Endoscopy;  Laterality: N/A;    GANGLION CYST EXCISION Left 1992    UMBILICAL HERNIA REPAIR  2011    with mesh       Review of patient's allergies indicates:  No Known Allergies    Current Facility-Administered Medications on File Prior to Encounter   Medication    lactated ringers infusion     Current Outpatient Medications on File Prior to Encounter   Medication Sig    bezlotoxumab (ZINPLAVA) 25 mg/mL Soln injection Inject 10 mg/kg intravenously single dose, during antibacterial treatment    budesonide (ENTOCORT EC) 3 mg capsule Take 3 capsules (9 mg total) by mouth once daily.    busPIRone (BUSPAR) 15 MG tablet Take 1 tablet (15 mg total) by mouth 3 (three) times daily.    EScitalopram oxalate (LEXAPRO) 20 MG tablet Take 1 tablet (20 mg total) by mouth once daily.    fidaxomicin (DIFICID) 200 mg Tab Take 1 tablet by mouth twice a day for 10 days    HYDROcodone-acetaminophen (NORCO) 5-325 mg per tablet Take 1 tablet by mouth four times a day as needed for pain    hyoscyamine (LEVBID) 0.375 mg Tb12 Take 0.375 mg by mouth 2 (two) times daily.    Lactobac 2-Bifido 1-S. therm (VSL#3) 112.5 billion cell Cap Take 1 capsule by mouth twice a day    LORazepam (ATIVAN) 0.5 MG tablet Take 1 tablet (0.5 mg total) by mouth every 6 (six) hours as needed for Anxiety.    magnesium oxide (MAG-OX) 400 mg (241.3 mg magnesium) tablet Take 1 tablet (400 mg total) by mouth 2 (two) times daily.    methylPREDNISolone (MEDROL) 2 MG tablet Take 8 tablets (16 mg total) by mouth once daily for 5 days, THEN 4 tablets (8 mg total) once daily for 5 days, THEN 2 tablets (4 mg total) once  daily for 5 days, THEN 1 tablet (2 mg total) once daily for 5 days.    metoprolol succinate (TOPROL-XL) 50 MG 24 hr tablet Take 1 tablet (50 mg total) by mouth once daily.    metroNIDAZOLE (FLAGYL) 500 MG tablet Take 1 tablet (500 mg total) by mouth every 8 (eight) hours. for 5 days    pantoprazole (PROTONIX) 40 MG tablet Take 40 mg by mouth 2 (two) times daily.    promethazine (PHENERGAN) 25 MG tablet Take 25 mg by mouth 4 (four) times daily as needed for Nausea.    rivaroxaban (XARELTO) 20 mg Tab Take 1 tablet (20 mg total) by mouth daily with dinner or evening meal.    simvastatin (ZOCOR) 20 MG tablet Take 1 tablet (20 mg total) by mouth every evening.    zolpidem (AMBIEN) 10 mg Tab Take 1 tablet (10 mg total) by mouth nightly as needed (insomnia).     Family History       Problem Relation (Age of Onset)    Asthma Mother          Tobacco Use    Smoking status: Former     Average packs/day: 1 pack/day for 30.0 years (30.0 ttl pk-yrs)     Types: Cigarettes     Start date: 1993    Smokeless tobacco: Never    Tobacco comments:     Pt states he has stopped smoking with Chantix three weeks ago. 12/1/23   Substance and Sexual Activity    Alcohol use: Yes     Comment: ocass    Drug use: Not Currently    Sexual activity: Not Currently     Review of Systems   Constitutional:  Negative for chills and fever.   HENT:  Negative for congestion and sore throat.    Eyes:  Negative for visual disturbance.   Respiratory:  Negative for shortness of breath.    Cardiovascular:  Negative for chest pain.   Gastrointestinal:  Positive for abdominal pain, blood in stool, diarrhea, nausea and vomiting.   Genitourinary:  Negative for flank pain.   Musculoskeletal:  Negative for myalgias.   Neurological:  Negative for syncope.   Psychiatric/Behavioral:  Negative for confusion.      Objective:     Vital Signs (Most Recent):  Temp: 98.8 °F (37.1 °C) (02/02/25 1205)  Pulse: 73 (02/02/25 1731)  Resp: 18 (02/02/25 1605)  BP: 104/64 (02/02/25  "1731)  SpO2: 99 % (02/02/25 1731) Vital Signs (24h Range):  Temp:  [98.8 °F (37.1 °C)] 98.8 °F (37.1 °C)  Pulse:  [71-84] 73  Resp:  [18] 18  SpO2:  [96 %-100 %] 99 %  BP: ()/(52-78) 104/64     Weight: 132.5 kg (292 lb)  Body mass index is 48.59 kg/m².     Physical Exam  Vitals reviewed.   Constitutional:       General: He is not in acute distress.  HENT:      Head: Normocephalic and atraumatic.      Nose: Nose normal.      Mouth/Throat:      Mouth: Mucous membranes are moist.   Eyes:      Conjunctiva/sclera: Conjunctivae normal.   Cardiovascular:      Rate and Rhythm: Normal rate and regular rhythm.   Pulmonary:      Effort: Pulmonary effort is normal. No respiratory distress.      Breath sounds: Normal breath sounds.   Abdominal:      General: Bowel sounds are normal.      Palpations: Abdomen is soft.      Tenderness: There is abdominal tenderness (L abdomen).   Musculoskeletal:         General: Normal range of motion.      Cervical back: Normal range of motion.      Right lower leg: No edema.      Left lower leg: No edema.   Skin:     General: Skin is warm and dry.   Neurological:      Mental Status: He is alert and oriented to person, place, and time.   Psychiatric:         Mood and Affect: Mood normal.                Significant Labs: All pertinent labs within the past 24 hours have been reviewed.  Blood Culture: No results for input(s): "LABBLOO" in the last 48 hours.  CBC:   Recent Labs   Lab 02/02/25  1342   WBC 19.31*   HGB 12.1*   HCT 38.6*        CMP:   Recent Labs   Lab 02/02/25  1342      K 3.6      CO2 25      BUN 25*   CREATININE 1.4   CALCIUM 8.4*   PROT 6.3   ALBUMIN 3.3*   BILITOT 0.6   ALKPHOS 36*   AST 18   ALT 19   ANIONGAP 9     Coagulation:   Recent Labs   Lab 02/02/25  1342   INR 1.4*   APTT 32.3*     Lactic Acid: No results for input(s): "LACTATE" in the last 48 hours.  Magnesium:   Recent Labs   Lab 02/02/25  1342   MG 1.7     TSH:   Recent Labs   Lab " 11/01/24  2204   TSH 0.515     Urine Studies:   Recent Labs   Lab 02/02/25  1640   COLORU Yellow   APPEARANCEUA Clear   PHUR 7.0   SPECGRAV <1.005*   PROTEINUA 1+*   GLUCUA Negative   KETONESU Negative   BILIRUBINUA Negative   OCCULTUA Trace*   NITRITE Negative   UROBILINOGEN Negative   LEUKOCYTESUR Negative   RBCUA 1   WBCUA 1   BACTERIA Rare   SQUAMEPITHEL 1   HYALINECASTS 0       Significant Imaging: I have reviewed all pertinent imaging results/findings within the past 24 hours.

## 2025-02-04 LAB
BACTERIA STL CULT: NORMAL
BACTERIA STL CULT: NORMAL

## 2025-02-04 NOTE — CONSULTS
GASTROENTEROLOGY INPATIENT CONSULT NOTE  Patient Name: Jacoby Jean-Baptiste  Patient MRN: [id number]  Patient : 1975    Admit Date: 2025  Service date: 2025    Reason for Consult: diarrhea.     PCP: Hunter Aguilar III, MD    Chief Complaint   Patient presents with    Abdominal Pain     Pt discharged on Friday after being admitted for 3 days for ulcerative colitis. Pt states he started having vomiting and diarrhea that night w/ abdominal pain. Pt feeling light headed and dehydrated.       HPI: Patient is a 50 y.o. male with PMHx HLD, DM on intermittent ozempic, HTN, LOLIS, umbilical hernia repair, tobacco use, recurrent C. diff s/p Vanco / dificid / FMT / rebyota, fungal cavitary lesions (while on remicade) presents for evaluation of diarrhea. Acute / chronic, intermittent, non-progressive. Finished abx for C. Diff but did not want to do Zinplava as ordered. Has been on tremfaya for approximately 2 months at this point. C. Diff negative on admission. Has appt w/ colorectal and getting second opinion from IBD specialist.     CHART REVIEW:   CT ABd  - persistent colitis; s/p karina; otherwise nml abd  Colon  - L sided colitis  Calpro 6750   Tremfaya started  due to lack fo response to Omvoh  Labs  - Neg giardia / c. diff; calpro 1050  Calprotectin  - increaing to 2310 despite Omvoh (Was decreasing initially)  Stool PCR - Negative.   Flex  - chronic (improved) inflammation; CMV neg  EGD/colon  - small HH; ampullary tic; mild ileitis (TI nml previously.?backwash?), L sided colitis  -reflux esophagitis. focal intestinal metaplasia antrum. Mild ileitis; sigmoid/proctitis; findings c/w UC; CMV negative in L sided biopsies  Labs  -  Calprotectin 2480 on Omvoh at around 2 months. (Was >8K on entyvio but had near normalized on remicade)  Hospitalization  - colitis on CT scan; Neg C. diff   Hospitalization 3/'24 - Calpro >8K; flex w/ severe L sided UC; placed on  steroids. C. diff neg;....d/c entyvio as lack of response  Hospitalization 2/'24; CT 2/'24 - fatty liver; L sided colitis; Calpro back up ot 3070 (was 201 on remicade); Neg C. diff  Entyvio started 1/'24  Hospitalization 11/'23 - cavitary fungal lesions...Remicade stopped  Labs 10/'23 - C.diff negative after vanco and rebyota enemas.  Labs 7/'23 -  Calprotectin 201 on remicade w/ C. diff...tx w/ vanco w/ long taper +/- rebyota; Nml CRP much better on remicade  EGD 4/'23 - candidate esophagitis / duodenitis; HP neg gastirits  Hospitalization 4/'23 - colitis on CT; C. diff neg; Remicade loaded  Colon 3/'23 - Pan-colitis; Nml TI; s/p FMT for refractory C. diff  C. DIff + 3/'23  Labs 1/'23 - CRP 21; Stool PCR + C. DIff....  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     Past Medical History:  Past Medical History:   Diagnosis Date    C. difficile colitis     Cavitary pneumonia 11/2023    pos aspergillus serology    Diabetes mellitus 01/2022    Hyperlipidemia 2015    Hypertension 2015    Insomnia 2015    Ulcerative colitis         Past Surgical History:  Past Surgical History:   Procedure Laterality Date    BRONCHOSCOPY WITH FLUOROSCOPY Left 11/20/2023    Procedure: BRONCHOSCOPY, WITH FLUOROSCOPY;  Surgeon: Lisa Villarreal MD;  Location: [facility];  Service: Pulmonary;  Laterality: Left;    BRONCHOSCOPY WITH FLUOROSCOPY N/A 11/30/2023    Procedure: BRONCHOSCOPY, WITH FLUOROSCOPY;  Surgeon: Lisa Villarreal MD;  Location: [facility];  Service: Pulmonary;  Laterality: N/A;    CARDIAC ELECTROPHYSIOLOGY MAPPING AND ABLATION  2017    SVT    CHOLECYSTECTOMY  2011    COLONOSCOPY N/A 5/3/2022    Procedure: COLONOSCOPY;  Surgeon: Shan Jean III, MD;  Location: [facility];  Service: Endoscopy;  Laterality: N/A;    COLONOSCOPY N/A 3/16/2024    Procedure: COLONOSCOPY;  Surgeon: ALEKSANDER Ramos MD;  Location: [facility];  Service: Endoscopy;   Laterality: N/A;    COLONOSCOPY N/A 1/17/2025    Procedure: COLONOSCOPY;  Surgeon: Russ Rowe MD;  Location: [facility];  Service: Endoscopy;  Laterality: N/A;    COLONOSCOPY WITH FECAL MICROBIOTA TRANSFER N/A 3/21/2023    Procedure: COLONOSCOPY, WITH FECAL MICROBIOTA TRANSFER;  Surgeon: David Millan MD;  Location: [facility];  Service: Endoscopy;  Laterality: N/A;    ESOPHAGOGASTRODUODENOSCOPY N/A 4/24/2023    Procedure: EGD (ESOPHAGOGASTRODUODENOSCOPY);  Surgeon: Shan Jean III, MD;  Location: [facility];  Service: Endoscopy;  Laterality: N/A;    ESOPHAGOGASTRODUODENOSCOPY N/A 6/5/2024    Procedure: EGD (ESOPHAGOGASTRODUODENOSCOPY);  Surgeon: Odalis Mccabe MD;  Location: [facility];  Service: Endoscopy;  Laterality: N/A;    FLEXIBLE SIGMOIDOSCOPY N/A 6/5/2024    Procedure: SIGMOIDOSCOPY, FLEXIBLE;  Surgeon: Odalis Mccabe MD;  Location: [facility];  Service: Endoscopy;  Laterality: N/A;    FLEXIBLE SIGMOIDOSCOPY N/A 7/16/2024    Procedure: SIGMOIDOSCOPY, FLEXIBLE;  Surgeon: Shan Jean III, MD;  Location: [facility];  Service: Endoscopy;  Laterality: N/A;    FLEXIBLE SIGMOIDOSCOPY N/A 8/13/2024    Procedure: SIGMOIDOSCOPY, FLEXIBLE;  Surgeon: Shan Jean III, MD;  Location: [facility];  Service: Endoscopy;  Laterality: N/A;    GANGLION CYST EXCISION Left 1992    UMBILICAL HERNIA REPAIR  2011    with mesh        Home Medications:  No medications prior to admission.       Inpatient Medications:        Review of patient's allergies indicates:  No Known Allergies    Social History:   Social History     Occupational History    Not on file   Tobacco Use    Smoking status: Former     Average packs/day: 1 pack/day for 30.0 years (30.0 ttl pk-yrs)     Types: Cigarettes     Start date: 1993    Smokeless tobacco: Never    Tobacco comments:     Pt states he has stopped smoking with Chantix three weeks ago. 12/1/23   Substance and Sexual Activity    Alcohol use: Yes     Comment:  "ocass    Drug use: Not Currently    Sexual activity: Not Currently       Family History:   Family History   Problem Relation Name Age of Onset    Asthma Mother         Review of Systems:  A 10 point review of systems was performed and was normal, except as mentioned in the HPI, including constitutional, HEENT, heme, lymph, cardiovascular, respiratory, gastrointestinal, genitourinary, neurologic, endocrine, psychiatric and musculoskeletal.      OBJECTIVE:    Physical Exam:  24 Hour Vital Sign Ranges: Temp:  [97.7 °F (36.5 °C)-98.2 °F (36.8 °C)] 98.2 °F (36.8 °C)  Pulse:  [62-70] 62  Resp:  [15-16] 15  SpO2:  [97 %-98 %] 98 %  BP: (127-136)/(78-80) 127/80  Most recent vitals: /80 (BP Location: Right arm, Patient Position: Lying)   Pulse 62   Temp 98.2 °F (36.8 °C) (Oral)   Resp 15   Ht 5' 5" (1.651 m)   Wt 124.8 kg (275 lb 3.2 oz)   SpO2 98%   BMI 45.80 kg/m²    GEN: well-developed, obese, awake and alert, non-toxic appearing adult  HEENT: PERRL, sclera anicteric, oral mucosa pink and moist without lesion  NECK: trachea midline; Good ROM  CV: regular rate and rhythm, no murmurs or gallops  RESP: clear to auscultation bilaterally, no wheezes, rhonci or rales  ABD: soft, non-tender, non-distended, normal bowel sounds  EXT: no swelling or edema, 2+ pulses distally  SKIN: no rashes or jaundice  PSYCH: normal affect    Labs:   Recent Labs     02/02/25  1342 02/03/25  0527   WBC 19.31* 12.69   MCV 86 86    185     Recent Labs     02/02/25  1342 02/03/25  0527    137   K 3.6 4.1    107   CO2 25 24   BUN 25* 22*    133*     No results for input(s): "ALB" in the last 72 hours.    Invalid input(s): "ALKP", "SGOT", "SGPT", "TBIL", "DBIL", "TPRO"  Recent Labs     02/02/25  1342 02/03/25  0527   INR 1.4* 1.1         Radiology Review:  X-Ray Chest PA And Lateral   Final Result      No acute cardiopulmonary process.         Electronically signed by: Charles Baker   Date:    02/02/2025 "   Time:    15:25      CT Abdomen Pelvis With IV Contrast Routine Oral Contrast   Final Result      1. Mild bowel wall thickening of the sigmoid colon with pericolonic fat stranding, felt to reflect colitis.  These findings are similar to previous exam.   2. Multiple nonobstructing left renal calculi.         Electronically signed by: Charles Baker   Date:    02/02/2025   Time:    15:30            IMPRESSION / RECOMMENDATIONS:   50 y.o. male with PMHx HLD, DM on intermittent ozempic, HTN, LOLIS, umbilical hernia repair, tobacco use, recurrent C. diff s/p Vanco / dificid / FMT / rebyota, fungal cavitary lesions (while on remicade) presents for evaluation of diarrhea. C.diff negative at this point and currently finishing up induction doses of tremfaya. Unforatunately, has had difficult to treat UC w/ persistent disease on colon in 1/'25. Colectomy has been considered but discussed risk / benefits and long term issues.     -Conservative for now from endocpic perspective  -Continue w/ budesonide/tremfya for now  -Has f/u w/ colo-rectal as well as getting second opinion from GI MD  -If continued lack of reponse to tremfaya, could consider sterlara. Also could consider restarting remicade as previous good response but discussed infectious complications sami w/ past cavitary fungal lesions. Rinvoq is another possibility but similar to remicade, increased risks of infectious complications.     Thank you for this consult.    Shan TINOCO Dayazminive III  2/4/2025  7:42 AM

## 2025-02-05 LAB
CALPROTECTIN STL-MCNT: 1000 UG/G (ref 0–120)
CALPROTECTIN STL-MCNT: 5790 UG/G (ref 0–120)

## 2025-02-07 ENCOUNTER — LAB VISIT (OUTPATIENT)
Dept: LAB | Facility: HOSPITAL | Age: 50
End: 2025-02-07
Attending: FAMILY MEDICINE
Payer: COMMERCIAL

## 2025-02-07 DIAGNOSIS — E46 UNSPECIFIED PROTEIN-CALORIE MALNUTRITION: Primary | ICD-10-CM

## 2025-02-07 LAB
BACTERIA BLD CULT: NORMAL
BACTERIA BLD CULT: NORMAL
CRP SERPL-MCNC: 0.4 MG/DL
PREALB SERPL-MCNC: 34.3 MG/DL (ref 20–43)

## 2025-02-07 PROCEDURE — 84134 ASSAY OF PREALBUMIN: CPT | Performed by: FAMILY MEDICINE

## 2025-02-07 PROCEDURE — 86140 C-REACTIVE PROTEIN: CPT | Performed by: FAMILY MEDICINE

## 2025-02-07 PROCEDURE — 36415 COLL VENOUS BLD VENIPUNCTURE: CPT | Performed by: FAMILY MEDICINE

## 2025-02-08 LAB
OHS QRS DURATION: 72 MS
OHS QTC CALCULATION: 456 MS

## 2025-02-10 ENCOUNTER — HOSPITAL ENCOUNTER (EMERGENCY)
Facility: HOSPITAL | Age: 50
Discharge: HOME | End: 2025-02-10
Attending: EMERGENCY MEDICINE
Payer: COMMERCIAL

## 2025-02-10 VITALS
RESPIRATION RATE: 16 BRPM | BODY MASS INDEX: 46.32 KG/M2 | SYSTOLIC BLOOD PRESSURE: 122 MMHG | TEMPERATURE: 99 F | WEIGHT: 278 LBS | HEIGHT: 65 IN | OXYGEN SATURATION: 98 % | HEART RATE: 82 BPM | DIASTOLIC BLOOD PRESSURE: 74 MMHG

## 2025-02-10 DIAGNOSIS — R42 DIZZINESS: ICD-10-CM

## 2025-02-10 DIAGNOSIS — K51.311 ULCERATIVE RECTOSIGMOIDITIS WITH RECTAL BLEEDING: ICD-10-CM

## 2025-02-10 DIAGNOSIS — K62.5 RECTAL BLEEDING: Primary | ICD-10-CM

## 2025-02-10 LAB
ALBUMIN SERPL BCP-MCNC: 3.3 G/DL (ref 3.5–5.2)
ALP SERPL-CCNC: 53 U/L (ref 55–135)
ALT SERPL W/O P-5'-P-CCNC: 12 U/L (ref 10–44)
ANION GAP SERPL CALC-SCNC: 6 MMOL/L (ref 8–16)
AST SERPL-CCNC: 9 U/L (ref 10–40)
BASOPHILS # BLD AUTO: 0.03 K/UL (ref 0–0.2)
BASOPHILS NFR BLD: 0.2 % (ref 0–1.9)
BILIRUB SERPL-MCNC: 0.4 MG/DL (ref 0.1–1)
BUN SERPL-MCNC: 14 MG/DL (ref 6–20)
CALCIUM SERPL-MCNC: 8.3 MG/DL (ref 8.7–10.5)
CHLORIDE SERPL-SCNC: 111 MMOL/L (ref 95–110)
CO2 SERPL-SCNC: 26 MMOL/L (ref 23–29)
CREAT SERPL-MCNC: 1.1 MG/DL (ref 0.5–1.4)
DIFFERENTIAL METHOD BLD: ABNORMAL
EOSINOPHIL # BLD AUTO: 0.2 K/UL (ref 0–0.5)
EOSINOPHIL NFR BLD: 1.6 % (ref 0–8)
ERYTHROCYTE [DISTWIDTH] IN BLOOD BY AUTOMATED COUNT: 20.1 % (ref 11.5–14.5)
EST. GFR  (NO RACE VARIABLE): >60 ML/MIN/1.73 M^2
GLUCOSE SERPL-MCNC: 128 MG/DL (ref 70–110)
HCT VFR BLD AUTO: 36.8 % (ref 40–54)
HGB BLD-MCNC: 11.1 G/DL (ref 14–18)
IMM GRANULOCYTES # BLD AUTO: 0.15 K/UL (ref 0–0.04)
IMM GRANULOCYTES NFR BLD AUTO: 1.1 % (ref 0–0.5)
LIPASE SERPL-CCNC: 11 U/L (ref 4–60)
LYMPHOCYTES # BLD AUTO: 1.3 K/UL (ref 1–4.8)
LYMPHOCYTES NFR BLD: 9 % (ref 18–48)
MCH RBC QN AUTO: 26.7 PG (ref 27–31)
MCHC RBC AUTO-ENTMCNC: 30.2 G/DL (ref 32–36)
MCV RBC AUTO: 89 FL (ref 82–98)
MONOCYTES # BLD AUTO: 0.8 K/UL (ref 0.3–1)
MONOCYTES NFR BLD: 5.6 % (ref 4–15)
NEUTROPHILS # BLD AUTO: 11.6 K/UL (ref 1.8–7.7)
NEUTROPHILS NFR BLD: 82.5 % (ref 38–73)
NRBC BLD-RTO: 0 /100 WBC
PLATELET # BLD AUTO: 373 K/UL (ref 150–450)
PMV BLD AUTO: 10 FL (ref 9.2–12.9)
POCT GLUCOSE: 139 MG/DL (ref 70–110)
POTASSIUM SERPL-SCNC: 3.5 MMOL/L (ref 3.5–5.1)
PROT SERPL-MCNC: 6.1 G/DL (ref 6–8.4)
RBC # BLD AUTO: 4.16 M/UL (ref 4.6–6.2)
SODIUM SERPL-SCNC: 143 MMOL/L (ref 136–145)
WBC # BLD AUTO: 14 K/UL (ref 3.9–12.7)

## 2025-02-10 PROCEDURE — 93005 ELECTROCARDIOGRAM TRACING: CPT | Performed by: INTERNAL MEDICINE

## 2025-02-10 PROCEDURE — 99284 EMERGENCY DEPT VISIT MOD MDM: CPT | Mod: 25

## 2025-02-10 PROCEDURE — 93010 ELECTROCARDIOGRAM REPORT: CPT | Mod: ,,, | Performed by: INTERNAL MEDICINE

## 2025-02-10 PROCEDURE — 83690 ASSAY OF LIPASE: CPT | Performed by: NURSE PRACTITIONER

## 2025-02-10 PROCEDURE — 85025 COMPLETE CBC W/AUTO DIFF WBC: CPT | Performed by: NURSE PRACTITIONER

## 2025-02-10 PROCEDURE — 25000003 PHARM REV CODE 250: Performed by: EMERGENCY MEDICINE

## 2025-02-10 PROCEDURE — 96361 HYDRATE IV INFUSION ADD-ON: CPT

## 2025-02-10 PROCEDURE — 96375 TX/PRO/DX INJ NEW DRUG ADDON: CPT

## 2025-02-10 PROCEDURE — 82962 GLUCOSE BLOOD TEST: CPT

## 2025-02-10 PROCEDURE — 63600175 PHARM REV CODE 636 W HCPCS: Mod: JZ,TB | Performed by: EMERGENCY MEDICINE

## 2025-02-10 PROCEDURE — 80053 COMPREHEN METABOLIC PANEL: CPT | Performed by: NURSE PRACTITIONER

## 2025-02-10 PROCEDURE — 96374 THER/PROPH/DIAG INJ IV PUSH: CPT

## 2025-02-10 RX ORDER — METHYLPREDNISOLONE SOD SUCC 125 MG
125 VIAL (EA) INJECTION
Status: COMPLETED | OUTPATIENT
Start: 2025-02-10 | End: 2025-02-10

## 2025-02-10 RX ORDER — DIPHENOXYLATE HYDROCHLORIDE AND ATROPINE SULFATE 2.5; .025 MG/1; MG/1
1 TABLET ORAL 3 TIMES DAILY PRN
Status: ON HOLD | COMMUNITY

## 2025-02-10 RX ORDER — ONDANSETRON HYDROCHLORIDE 2 MG/ML
4 INJECTION, SOLUTION INTRAVENOUS
Status: COMPLETED | OUTPATIENT
Start: 2025-02-10 | End: 2025-02-10

## 2025-02-10 RX ORDER — HYDROCODONE BITARTRATE AND ACETAMINOPHEN 5; 325 MG/1; MG/1
1 TABLET ORAL
Status: COMPLETED | OUTPATIENT
Start: 2025-02-10 | End: 2025-02-10

## 2025-02-10 RX ADMIN — SODIUM CHLORIDE 1000 ML: 9 INJECTION, SOLUTION INTRAVENOUS at 06:02

## 2025-02-10 RX ADMIN — HYDROCODONE BITARTRATE AND ACETAMINOPHEN 1 TABLET: 5; 325 TABLET ORAL at 07:02

## 2025-02-10 RX ADMIN — METHYLPREDNISOLONE SODIUM SUCCINATE 125 MG: 125 INJECTION, POWDER, FOR SOLUTION INTRAMUSCULAR; INTRAVENOUS at 06:02

## 2025-02-10 RX ADMIN — ONDANSETRON 4 MG: 2 INJECTION INTRAMUSCULAR; INTRAVENOUS at 06:02

## 2025-02-10 NOTE — FIRST PROVIDER EVALUATION
" Emergency Department TeleTriage Encounter Note      CHIEF COMPLAINT    Chief Complaint   Patient presents with    Nausea     Nausea/vomiting/diarrhea started last week.  Pt reports blood in stool started last week also.  Pt reports being here recently "several times"  for the same thing       VITAL SIGNS   Initial Vitals [02/10/25 1707]   BP Pulse Resp Temp SpO2   115/69 91 16 99.1 °F (37.3 °C) 99 %      MAP       --            ALLERGIES    Review of patient's allergies indicates:  No Known Allergies    PROVIDER TRIAGE NOTE  Verbal consent for the teletriage evaluation was given by the patient at the start of the evaluation.  All efforts will be made to maintain patient's privacy during the evaluation.      This is a teletriage evaluation of a 50 y.o. male presenting to the ED with c/o vomiting, nausea, bloody diarrhea, and palpitations for 1 week. PMH UC, GI Duatrieve.  On Tremfiya and budesonide PO.  Limited physical exam via telehealth: The patient is awake, alert, answering questions appropriately and is not in respiratory distress.  As the Teletriage provider, I performed an initial assessment and ordered appropriate labs and imaging studies, if any, to facilitate the patient's care once placed in the ED. Once a room is available, care and a full evaluation will be completed by an alternate ED provider.  Any additional orders and the final disposition will be determined by that provider.  All imaging and labs will not be followed-up by the Teletriage Team, including myself.        ORDERS  Labs Reviewed - No data to display    ED Orders (720h ago, onward)      Start Ordered     Status Ordering Provider    02/10/25 1712 02/10/25 1711  Vital signs  Every 2 hours         Ordered STEVE CORCORAN    02/10/25 1712 02/10/25 1711  Diet NPO  Diet effective now         Ordered STEVE CORCORAN    02/10/25 1712 02/10/25 1711  Insert peripheral IV  Once         Ordered STEVE CORCORAN    02/10/25 1712 02/10/25 1711  CBC W/ AUTO " DIFFERENTIAL  STAT         Ordered STEVE CORCORAN    02/10/25 1712 02/10/25 1711  Comp. Metabolic Panel  STAT         Ordered STEVE CORCORAN    02/10/25 1712 02/10/25 1711  Lipase  STAT         Ordered STEVE CORCORAN    02/10/25 1712 02/10/25 1711  Urinalysis, Reflex to Urine Culture Urine, Clean Catch  STAT         Ordered STEVE CORCORAN    02/10/25 1712 02/10/25 1711  EKG 12-lead  Once         Ordered STEVE CORCORAN    02/10/25 1712 02/10/25 1711  POCT glucose  Once         Ordered STEVE CORCORAN    02/10/25 1712 02/10/25 1711  Occult blood x 1, stool  Once         Ordered STEVE CORCORAN              Virtual Visit Note: The provider triage portion of this emergency department evaluation and documentation was performed via [employer], a HIPAA-compliant telemedicine application, in concert with a tele-presenter in the room. A face to face patient evaluation with one of my colleagues will occur once the patient is placed in an emergency department room.      DISCLAIMER: This note was prepared with [employer] voice recognition transcription software. Garbled syntax, mangled pronouns, and other bizarre constructions may be attributed to that software system.

## 2025-02-11 NOTE — ED PROVIDER NOTES
"Chief complaint:  Nausea (Nausea/vomiting/diarrhea started last week.  Pt reports blood in stool started last week also.  Pt reports being here recently "several times"  for the same thing)      HPI:  Jacoby Jean-Baptiste is a 50 y.o. male with a history of ulcerative colitis on immunosuppressive antibody therapy and recent steroids and recent hospitalization for dehydration and bleeding with the abdominal pain, prior C. Diff s/p fecal txp, DVT on rivaroxaban, DM, anemia, chronic malnutrition, currently consideriding operative management with colorectal surgery per EMR presenting with a one-week history of recurrent diarrhea with bloody stools.  In regard to abdominal pain he endorses chronic left lower quadrant pain that he denies significantly different.  This is intermittent in nature without radiation or migration.  He has begun to experience some positional lightheadedness over the past two days.  He has had nausea without emesis.  He denies fever.    ROS: As per HPI and below:  No rash, new swelling, chest pain, dyspnea, urinary frequency, urgency, dysuria, flank pain.    Review of patient's allergies indicates:  No Known Allergies    Discharge Medication List as of 2/10/2025  7:05 PM        CONTINUE these medications which have NOT CHANGED    Details   diphenoxylate-atropine 2.5-0.025 mg (LOMOTIL) 2.5-0.025 mg per tablet Take 1 tablet by mouth 3 (three) times daily as needed for Diarrhea., Historical Med      bezlotoxumab (ZINPLAVA) 25 mg/mL Soln injection Inject 10 mg/kg intravenously single dose, during antibacterial treatment, Starting Fri 1/3/2025, Normal      budesonide (ENTOCORT EC) 3 mg capsule Take 3 capsules (9 mg total) by mouth once daily., Starting Mon 12/9/2024, Normal      busPIRone (BUSPAR) 15 MG tablet Take 1 tablet (15 mg total) by mouth 3 (three) times daily., Starting Wed 8/28/2024, Until Thu 8/28/2025, Normal      EScitalopram oxalate (LEXAPRO) 20 MG tablet Take 1 tablet (20 mg total) by mouth " once daily., Starting Fri 1/24/2025, Normal      fidaxomicin (DIFICID) 200 mg Tab Take 1 tablet by mouth twice a day for 10 days, Starting Wed 1/29/2025, Normal      HYDROcodone-acetaminophen (NORCO) 5-325 mg per tablet Take 1 tablet by mouth four times a day as needed for pain, Starting Wed 1/29/2025, Normal      hyoscyamine (LEVBID) 0.375 mg Tb12 Take 0.375 mg by mouth 2 (two) times daily., Historical Med      Lactobac 2-Bifido 1-S. therm (VSL#3) 112.5 billion cell Cap Take 1 capsule by mouth twice a day, Starting Mon 1/6/2025, Normal      LORazepam (ATIVAN) 0.5 MG tablet Take 1 tablet (0.5 mg total) by mouth every 6 (six) hours as needed for Anxiety., Starting Wed 8/28/2024, Until Fri 1/31/2031 at 2359, Normal      magnesium oxide (MAG-OX) 400 mg (241.3 mg magnesium) tablet Take 1 tablet (400 mg total) by mouth 2 (two) times daily., Starting Tue 12/24/2024, Normal      methylPREDNISolone (MEDROL) 4 MG Tab Multiple Dosages:Starting Fri 1/31/2025, Until Tue 2/4/2025 at 2359, THEN Starting Wed 2/5/2025, Until Sun 2/9/2025 at 2359, THEN Starting Mon 2/10/2025, Until Fri 2/14/2025 at 2359Take 4 tablets (16 mg total) by mouth once daily for 5 days, THEN 2 t ablets (8 mg total) once daily for 5 days, THEN 1 tablet (4 mg total) once daily for 5 days, THEN 1/2 tablet by mouth daily for 5 days., Normal      metoprolol succinate (TOPROL-XL) 50 MG 24 hr tablet Take 1 tablet (50 mg total) by mouth once daily., Starting Fri 7/5/2024, Normal      pantoprazole (PROTONIX) 40 MG tablet Take 40 mg by mouth 2 (two) times daily., Historical Med      promethazine (PHENERGAN) 25 MG tablet Take 25 mg by mouth 4 (four) times daily as needed for Nausea., Historical Med      rivaroxaban (XARELTO) 20 mg Tab Take 1 tablet (20 mg total) by mouth daily with dinner or evening meal., Starting Mon 1/13/2025, Normal      simvastatin (ZOCOR) 20 MG tablet Take 1 tablet (20 mg total) by mouth every evening., Starting Fri 7/5/2024, Normal       zolpidem (AMBIEN) 10 mg Tab Take 1 tablet (10 mg total) by mouth nightly as needed (insomnia)., Starting Fri 12/27/2024, Until Fri 6/27/2025 at 2359, Normal             PMH:  As per HPI and below:  Past Medical History:   Diagnosis Date    C. difficile colitis     Cavitary pneumonia 11/2023    pos aspergillus serology    Diabetes mellitus 01/2022    Hyperlipidemia 2015    Hypertension 2015    Insomnia 2015    Ulcerative colitis      Past Surgical History:   Procedure Laterality Date    BRONCHOSCOPY WITH FLUOROSCOPY Left 11/20/2023    Procedure: BRONCHOSCOPY, WITH FLUOROSCOPY;  Surgeon: Lisa Villarreal MD;  Location: [facility];  Service: Pulmonary;  Laterality: Left;    BRONCHOSCOPY WITH FLUOROSCOPY N/A 11/30/2023    Procedure: BRONCHOSCOPY, WITH FLUOROSCOPY;  Surgeon: Lisa Villarreal MD;  Location: [facility];  Service: Pulmonary;  Laterality: N/A;    CARDIAC ELECTROPHYSIOLOGY MAPPING AND ABLATION  2017    SVT    CHOLECYSTECTOMY  2011    COLONOSCOPY N/A 5/3/2022    Procedure: COLONOSCOPY;  Surgeon: Shan Jean III, MD;  Location: [facility];  Service: Endoscopy;  Laterality: N/A;    COLONOSCOPY N/A 3/16/2024    Procedure: COLONOSCOPY;  Surgeon: ALEKSANDER Ramos MD;  Location: [facility];  Service: Endoscopy;  Laterality: N/A;    COLONOSCOPY N/A 1/17/2025    Procedure: COLONOSCOPY;  Surgeon: Russ Rowe MD;  Location: [facility];  Service: Endoscopy;  Laterality: N/A;    COLONOSCOPY WITH FECAL MICROBIOTA TRANSFER N/A 3/21/2023    Procedure: COLONOSCOPY, WITH FECAL MICROBIOTA TRANSFER;  Surgeon: David Millan MD;  Location: [facility];  Service: Endoscopy;  Laterality: N/A;    ESOPHAGOGASTRODUODENOSCOPY N/A 4/24/2023    Procedure: EGD (ESOPHAGOGASTRODUODENOSCOPY);  Surgeon: Shan Jean III, MD;  Location: [facility];  Service: Endoscopy;  Laterality: N/A;    ESOPHAGOGASTRODUODENOSCOPY N/A 6/5/2024    Procedure: EGD (ESOPHAGOGASTRODUODENOSCOPY);  Surgeon: Odalis Mccabe MD;   Location: [facility];  Service: Endoscopy;  Laterality: N/A;    FLEXIBLE SIGMOIDOSCOPY N/A 6/5/2024    Procedure: SIGMOIDOSCOPY, FLEXIBLE;  Surgeon: Odalis Mccabe MD;  Location: [facility] ENDO;  Service: Endoscopy;  Laterality: N/A;    FLEXIBLE SIGMOIDOSCOPY N/A 7/16/2024    Procedure: SIGMOIDOSCOPY, FLEXIBLE;  Surgeon: Shan Jean III, MD;  Location: [facility] END[facility];  Service: Endoscopy;  Laterality: N/A;    FLEXIBLE SIGMOIDOSCOPY N/A 8/13/2024    Procedure: SIGMOIDOSCOPY, FLEXIBLE;  Surgeon: Shan Jean III, MD;  Location: [facility] ENDO;  Service: Endoscopy;  Laterality: N/A;    GANGLION CYST EXCISION Left 1992    UMBILICAL HERNIA REPAIR  2011    with mesh       Social History     Socioeconomic History    Marital status: Single   Tobacco Use    Smoking status: Former     Average packs/day: 1 pack/day for 30.0 years (30.0 ttl pk-yrs)     Types: Cigarettes     Start date: 1993    Smokeless tobacco: Never    Tobacco comments:     Pt states he has stopped smoking with Chantix three weeks ago. 12/1/23   Substance and Sexual Activity    Alcohol use: Yes     Comment: ocass    Drug use: Not Currently    Sexual activity: Not Currently     Social Drivers of Health     Financial Resource Strain: Low Risk  (2/5/2025)    Received from [facility]    Overall Financial Resource Strain (CARDIA)     Difficulty of Paying Living Expenses: Not hard at all   Food Insecurity: No Food Insecurity (2/5/2025)    Received from [facility]    Hunger Vital Sign     Worried About Running Out of Food in the Last Year: Never true     Ran Out of Food in the Last Year: Never true   Transportation Needs: No Transportation Needs (2/5/2025)    Received from [facility]    PRAPARE - Transportation     Lack of Transportation (Medical): No     Lack of Transportation (Non-Medical): No   Physical Activity: Inactive (2/5/2025)    Received from [facility]    Exercise Vital Sign     Days of Exercise per Week: 0 days     Minutes of Exercise per  Session: 0 min   Stress: Stress Concern Present (2/5/2025)    Received from [facility] - Occupational Stress Questionnaire     Feeling of Stress : Rather much   Housing Stability: Unknown (2/5/2025)    Received from [facility]    Housing Stability Vital Sign     Unable to Pay for Housing in the Last Year: No       Family History   Problem Relation Name Age of Onset    Asthma Mother         Physical Exam:    Vitals:    02/10/25 1941   BP: 122/74   Pulse: 82   Resp: 16   Temp: 98.8 °F (37.1 °C)     GENERAL:  No apparent distress.  Alert.    HEENT:  Moist mucous membranes.  Normocephalic and atraumatic.    NECK:  No swelling.  Midline trachea.   CARDIOVASCULAR:  Regular rate and rhythm.  2+ radial pulses.  No murmur.  PULMONARY:  Lungs clear to auscultation bilaterally.  No wheezes, rales, or rhonci.    ABDOMEN:  Left lower quadrant tenderness without voluntary guarding or involuntary guarding.  No distention, masses, palpable hernias.    EXTREMITIES:  Warm and well perfused.  Brisk capillary refill.  Trace pitting pedal edema.  NEUROLOGICAL:  Normal mental status.  Appropriate and conversant.    SKIN:  No rashes or ecchymoses.    BACK:  Atraumatic.  No CVA tenderness to palpation.      Labs Reviewed   CBC W/ AUTO DIFFERENTIAL - Abnormal       Result Value    WBC 14.00 (*)     RBC 4.16 (*)     Hemoglobin 11.1 (*)     Hematocrit 36.8 (*)     MCV 89      MCH 26.7 (*)     MCHC 30.2 (*)     RDW 20.1 (*)     Platelets 373      MPV 10.0      Immature Granulocytes 1.1 (*)     Gran # (ANC) 11.6 (*)     Immature Grans (Abs) 0.15 (*)     Lymph # 1.3      Mono # 0.8      Eos # 0.2      Baso # 0.03      nRBC 0      Gran % 82.5 (*)     Lymph % 9.0 (*)     Mono % 5.6      Eosinophil % 1.6      Basophil % 0.2      Differential Method Automated     COMPREHENSIVE METABOLIC PANEL - Abnormal    Sodium 143      Potassium 3.5      Chloride 111 (*)     CO2 26      Glucose 128 (*)     BUN 14       Creatinine 1.1      Calcium 8.3 (*)     Total Protein 6.1      Albumin 3.3 (*)     Total Bilirubin 0.4      Alkaline Phosphatase 53 (*)     AST 9 (*)     ALT 12      eGFR >60.0      Anion Gap 6 (*)    POCT GLUCOSE - Abnormal    POCT Glucose 139 (*)    LIPASE    Lipase 11     OCCULT BLOOD X 1, STOOL   POCT GLUCOSE MONITORING CONTINUOUS       Discharge Medication List as of 2/10/2025  7:05 PM        CONTINUE these medications which have NOT CHANGED    Details   diphenoxylate-atropine 2.5-0.025 mg (LOMOTIL) 2.5-0.025 mg per tablet Take 1 tablet by mouth 3 (three) times daily as needed for Diarrhea., Historical Med      bezlotoxumab (ZINPLAVA) 25 mg/mL Soln injection Inject 10 mg/kg intravenously single dose, during antibacterial treatment, Starting Fri 1/3/2025, Normal      budesonide (ENTOCORT EC) 3 mg capsule Take 3 capsules (9 mg total) by mouth once daily., Starting Mon 12/9/2024, Normal      busPIRone (BUSPAR) 15 MG tablet Take 1 tablet (15 mg total) by mouth 3 (three) times daily., Starting Wed 8/28/2024, Until Thu 8/28/2025, Normal      EScitalopram oxalate (LEXAPRO) 20 MG tablet Take 1 tablet (20 mg total) by mouth once daily., Starting Fri 1/24/2025, Normal      fidaxomicin (DIFICID) 200 mg Tab Take 1 tablet by mouth twice a day for 10 days, Starting Wed 1/29/2025, Normal      HYDROcodone-acetaminophen (NORCO) 5-325 mg per tablet Take 1 tablet by mouth four times a day as needed for pain, Starting Wed 1/29/2025, Normal      hyoscyamine (LEVBID) 0.375 mg Tb12 Take 0.375 mg by mouth 2 (two) times daily., Historical Med      Lactobac 2-Bifido 1-S. therm (VSL#3) 112.5 billion cell Cap Take 1 capsule by mouth twice a day, Starting Mon 1/6/2025, Normal      LORazepam (ATIVAN) 0.5 MG tablet Take 1 tablet (0.5 mg total) by mouth every 6 (six) hours as needed for Anxiety., Starting Wed 8/28/2024, Until Fri 1/31/2031 at 2359, Normal      magnesium oxide (MAG-OX) 400 mg (241.3 mg magnesium) tablet Take 1 tablet (400 mg  total) by mouth 2 (two) times daily., Starting Tue 12/24/2024, Normal      methylPREDNISolone (MEDROL) 4 MG Tab Multiple Dosages:Starting Fri 1/31/2025, Until Tue 2/4/2025 at 2359, THEN Starting Wed 2/5/2025, Until Sun 2/9/2025 at 2359, THEN Starting Mon 2/10/2025, Until Fri 2/14/2025 at 2359Take 4 tablets (16 mg total) by mouth once daily for 5 days, THEN 2 t ablets (8 mg total) once daily for 5 days, THEN 1 tablet (4 mg total) once daily for 5 days, THEN 1/2 tablet by mouth daily for 5 days., Normal      metoprolol succinate (TOPROL-XL) 50 MG 24 hr tablet Take 1 tablet (50 mg total) by mouth once daily., Starting Fri 7/5/2024, Normal      pantoprazole (PROTONIX) 40 MG tablet Take 40 mg by mouth 2 (two) times daily., Historical Med      promethazine (PHENERGAN) 25 MG tablet Take 25 mg by mouth 4 (four) times daily as needed for Nausea., Historical Med      rivaroxaban (XARELTO) 20 mg Tab Take 1 tablet (20 mg total) by mouth daily with dinner or evening meal., Starting Mon 1/13/2025, Normal      simvastatin (ZOCOR) 20 MG tablet Take 1 tablet (20 mg total) by mouth every evening., Starting Fri 7/5/2024, Normal      zolpidem (AMBIEN) 10 mg Tab Take 1 tablet (10 mg total) by mouth nightly as needed (insomnia)., Starting Fri 12/27/2024, Until Fri 6/27/2025 at 2359, Normal             Orders Placed This Encounter   Procedures    CBC W/ AUTO DIFFERENTIAL    Comp. Metabolic Panel    Lipase    Occult blood x 1, stool    Vital signs    EKG 12-lead    Insert peripheral IV       Imaging Results    None         ED Course as of 02/10/25 2111   Mon Feb 10, 2025   1904 Discussed possible admission with Dr. Prasad [facility] medicine who knows the patient well.  We did review presentation with repeat workup and she recommended close outpatient follow-up as this is similar to multiple prior episodes without other intervention necessary in the hospital other than basic supportive care. [MR]   1935 EKG:  Normal sinus rhythm at a rate of  71.  Normal intervals.  Normal axis.  No significant ST or T wave changes suggesting acute ischemia or infarction.  (Independently interpreted by me)   [MR]      ED Course User Index  [MR] Kirk Franks MD       MDM:    50 y.o. male with recurrent diarrhea with bloody stools in the setting of ulcerative colitis with multiple similar flares and chronic left-sided abdominal pain he endorses is unchanged.  I suspect flare of ulcerative colitis. I have discussed the risks, benefits, and alternative of CT scan with the patient.  Risks include increased of a future malignancy that could be fatal with ionizing radiation exposure as well as IV dye injury to the kidneys possibly leading to renal failure if IV dye is required.  There is also the opposing risk of missed or delayed diagnosis if a CT is not performed today.  The patient understands these issues and was able to repeat them back to me in an intelligible manner.  Patient elects to hold off on further imaging, which I feel is reasonable given similarity to recurrent pattern of presentation.  I have low suspicion for complications such as toxic megacolon, abscess, perforation, obstruction.  Laboratories reviewed with no significant decline in anemia.  EKG reviewed with very low suspicion for cardiac process such as arrhythmia or ischemia.  I do not think further cardiac biomarker is indicated.  He endorses longer-term plan is follow up in March for potential initial operative therapy given history of frequent recurrences.  Patient ambulatory here and without significant decline in hemoglobin.  Recent outpatient follow-up including normal CRP noted.  Patient states he has steroid at home he has discretion to taking case of flare and I will defer this to close short-term follow up with his GI doctor once again.  I do not think he requires admission for other emergent therapy.  Short-term follow up also for recheck of laboratories and reassessment discussed in  detail along with detailed return precautions.  I doubt life-threatening GI bleed.    Diagnoses:    1. Bloody stools   2.  Ulcerative colitis flare       Kirk Franks MD  02/10/25 2111

## 2025-02-12 ENCOUNTER — OFFICE VISIT (OUTPATIENT)
Dept: FAMILY MEDICINE | Facility: CLINIC | Age: 50
End: 2025-02-12
Payer: COMMERCIAL

## 2025-02-12 ENCOUNTER — TELEPHONE (OUTPATIENT)
Dept: CARDIOLOGY | Facility: CLINIC | Age: 50
End: 2025-02-12
Payer: COMMERCIAL

## 2025-02-12 VITALS
WEIGHT: 291.44 LBS | HEART RATE: 75 BPM | SYSTOLIC BLOOD PRESSURE: 114 MMHG | DIASTOLIC BLOOD PRESSURE: 68 MMHG | BODY MASS INDEX: 48.55 KG/M2 | OXYGEN SATURATION: 97 % | TEMPERATURE: 99 F | HEIGHT: 65 IN

## 2025-02-12 DIAGNOSIS — F41.9 ANXIETY: ICD-10-CM

## 2025-02-12 DIAGNOSIS — D64.9 ANEMIA, UNSPECIFIED TYPE: ICD-10-CM

## 2025-02-12 DIAGNOSIS — Z86.79 S/P RADIOFREQUENCY ABLATION OPERATION FOR ARRHYTHMIA: Primary | ICD-10-CM

## 2025-02-12 DIAGNOSIS — Z79.01 ANTICOAGULATED: ICD-10-CM

## 2025-02-12 DIAGNOSIS — E78.5 HYPERLIPIDEMIA, UNSPECIFIED HYPERLIPIDEMIA TYPE: ICD-10-CM

## 2025-02-12 DIAGNOSIS — E11.9 TYPE 2 DIABETES MELLITUS WITHOUT COMPLICATION, WITHOUT LONG-TERM CURRENT USE OF INSULIN: ICD-10-CM

## 2025-02-12 DIAGNOSIS — K51.011 ULCERATIVE PANCOLITIS WITH RECTAL BLEEDING: ICD-10-CM

## 2025-02-12 DIAGNOSIS — Z98.890 S/P RADIOFREQUENCY ABLATION OPERATION FOR ARRHYTHMIA: Primary | ICD-10-CM

## 2025-02-12 DIAGNOSIS — G47.00 INSOMNIA, UNSPECIFIED TYPE: ICD-10-CM

## 2025-02-12 LAB
O+P SPEC MICRO: NORMAL
O+P STL CONC: NORMAL

## 2025-02-12 PROCEDURE — 99999 PR PBB SHADOW E&M-EST. PATIENT-LVL IV: CPT | Mod: PBBFAC,,, | Performed by: FAMILY MEDICINE

## 2025-02-12 RX ORDER — DIAZEPAM 5 MG/1
5 TABLET ORAL DAILY PRN
Qty: 30 TABLET | Refills: 0 | Status: ON HOLD | OUTPATIENT
Start: 2025-02-12

## 2025-02-12 RX ORDER — DIAZEPAM 5 MG/1
5 TABLET ORAL DAILY PRN
COMMUNITY
End: 2025-02-12 | Stop reason: SDUPTHER

## 2025-02-12 NOTE — TELEPHONE ENCOUNTER
----- Message from [employer] sent at 2/12/2025 10:48 AM CST -----  PATIENT DROPPED OFF PAPER WORK TO GET FILLED OUT ,, PLEASE CALL HIM AND LET HIM KNOW WHEN ITS READY . THANK YOU

## 2025-02-12 NOTE — TELEPHONE ENCOUNTER
Lvm informing the pt that the sx clearance will be faxed to performing providers office when sign by Cardiology doctor

## 2025-02-12 NOTE — LETTER
.  [facility]  [address]  [address]  [place] [address]  Phone: [phone]  Fax: [phone] Date: 2025    Patient:  Jacoby Jean-Baptiste                   MRN#:[id number]  : 1975  Referring Physician: Jessica Church             Procedure: Total colectomy w/ileostomy     Current Outpatient Medications   Medication Sig Dispense Refill    bezlotoxumab (ZINPLAVA) 25 mg/mL Soln injection Inject 10 mg/kg intravenously single dose, during antibacterial treatment (Patient not taking: Reported on 2025) 40 mL 0    budesonide (ENTOCORT EC) 3 mg capsule Take 3 capsules (9 mg total) by mouth once daily. 180 each 0    busPIRone (BUSPAR) 15 MG tablet Take 1 tablet (15 mg total) by mouth 3 (three) times daily. 90 tablet 5    diazePAM (VALIUM) 5 MG tablet Take 1 tablet (5 mg total) by mouth daily as needed for Anxiety. 30 tablet 0    diphenoxylate-atropine 2.5-0.025 mg (LOMOTIL) 2.5-0.025 mg per tablet Take 1 tablet by mouth 3 (three) times daily as needed for Diarrhea.      EScitalopram oxalate (LEXAPRO) 20 MG tablet Take 1 tablet (20 mg total) by mouth once daily. 90 tablet 0    fidaxomicin (DIFICID) 200 mg Tab Take 1 tablet by mouth twice a day for 10 days (Patient not taking: Reported on 2025) 20 tablet 0    HYDROcodone-acetaminophen (NORCO) 5-325 mg per tablet Take 1 tablet by mouth four times a day as needed for pain 60 tablet 0    hyoscyamine (LEVBID) 0.375 mg Tb12 Take 0.375 mg by mouth 2 (two) times daily.      Lactobac 2-Bifido 1-S. therm (VSL#3) 112.5 billion cell Cap Take 1 capsule by mouth twice a day 60 capsule 0    magnesium oxide (MAG-OX) 400 mg (241.3 mg magnesium) tablet Take 1 tablet (400 mg total) by mouth 2 (two) times daily. 28 tablet 0    methylPREDNISolone (MEDROL) 4 MG Tab Take 4 tablets (16 mg total) by mouth once daily for 5 days, THEN 2 tablets (8 mg total) once daily for 5 days, THEN 1 tablet (4 mg total) once  daily for 5 days, THEN 1/2 tablet by mouth daily for 5 days. (Patient not taking: Reported on 2/12/2025) 40 tablet 0    metoprolol succinate (TOPROL-XL) 50 MG 24 hr tablet Take 1 tablet (50 mg total) by mouth once daily. 90 tablet 1    pantoprazole (PROTONIX) 40 MG tablet Take 40 mg by mouth 2 (two) times daily.      promethazine (PHENERGAN) 25 MG tablet Take 25 mg by mouth 4 (four) times daily as needed for Nausea.      rivaroxaban (XARELTO) 20 mg Tab Take 1 tablet (20 mg total) by mouth daily with dinner or evening meal. 90 tablet 0    simvastatin (ZOCOR) 20 MG tablet Take 1 tablet (20 mg total) by mouth every evening. 90 tablet 1    zolpidem (AMBIEN) 10 mg Tab Take 1 tablet (10 mg total) by mouth nightly as needed (insomnia). 30 tablet 2     No current facility-administered medications for this visit.     Facility-Administered Medications Ordered in Other Visits   Medication Dose Route Frequency Provider Last Rate Last Admin    lactated ringers infusion   Intravenous Continuous David Millan MD 75 mL/hr at 03/21/23 1252 New Bag at 03/21/23 1252       This patient has been assessed for risk factors for clearance of surgery with the following stipulations:    [] No Contraindications.      [x] Recommendations for XARELTO: Hold 3  DAYS.    [x] Patient is MODERATE RISK    [x] Cleared for surgery with the following restrictions: moderate risk     [] Not Cleared for surgery due to the following reasons:    If you have any questions regarding the above, please contact my office at [phone]    Clearing Provider: Kimberlee Orozco NP

## 2025-02-13 PROBLEM — J84.89 OTHER SPECIFIED INTERSTITIAL PULMONARY DISEASES: Status: RESOLVED | Noted: 2024-01-09 | Resolved: 2025-02-13

## 2025-02-13 LAB
OHS QRS DURATION: 74 MS
OHS QTC CALCULATION: 447 MS

## 2025-02-14 NOTE — PROGRESS NOTES
Subjective:       Patient ID: Jacoby Jean-Baptiste is a 50 y.o. male.    Chief Complaint: Hospital Follow Up (Discharge 2/2)    Hospital follow-up.  Discharged on February 2nd.  He is going for a colectomy with ileostomy on March 6th.  Severe ulcerative colitis bleeding episode again.  Transfused.  Hospitalized February 2nd and 3rd.  Type 2 diabetes glucose 128.  Doing okay.  Only steroid is the oral budesonide.  No cough or sputum no chest pain no palpitations no PND orthopnea.  Radiofrequency ablation.  Heart rhythm is been good.  Anxiety on BuSpar and Ativan.  Anticoagulated.  BMI 48.5.  Anemia hemoglobin 11.1.  He would like to change the Ativan to Valium 2 he can get through the surgery.    Physical examination.  Vital signs noted.  Overweight male no acute distress neck without bruit no adenopathy.  Chest clear.  Heart regular rate rhythm without murmur gallop.  Abdomen bowel sounds are positive tender throughout.  No rebound.  Extremities without edema positive pedal pulses.        Objective:        Assessment:       1. S/P radiofrequency ablation operation for arrhythmia    2. Ulcerative pancolitis with rectal bleeding    3. Anticoagulated    4. Hyperlipidemia, unspecified hyperlipidemia type    5. Anxiety    6. Type 2 diabetes mellitus without complication, without long-term current use of insulin    7. Insomnia, unspecified type    8. BMI 45.0-49.9, adult    9. Anemia, unspecified type        Plan:       S/P radiofrequency ablation operation for arrhythmia    Ulcerative pancolitis with rectal bleeding    Anticoagulated    Hyperlipidemia, unspecified hyperlipidemia type    Anxiety  -     diazePAM (VALIUM) 5 MG tablet; Take 1 tablet (5 mg total) by mouth daily as needed for Anxiety.  Dispense: 30 tablet; Refill: 0    Type 2 diabetes mellitus without complication, without long-term current use of insulin    Insomnia, unspecified type    BMI 45.0-49.9, adult    Anemia, unspecified type      He is okay for the  surgery.  Moderate risk.  Valium 5 mg daily maximum p.r.n. 30 pills.  Discussed his postoperative care.  He will probably need to go to skilled nursing facility postoperatively for some rehabilitation.  Does not have good family support system in the area.

## 2025-02-15 ENCOUNTER — HOSPITAL ENCOUNTER (INPATIENT)
Facility: HOSPITAL | Age: 50
LOS: 1 days | Discharge: HOME | DRG: 386 | End: 2025-02-17
Attending: EMERGENCY MEDICINE | Admitting: INTERNAL MEDICINE
Payer: COMMERCIAL

## 2025-02-15 DIAGNOSIS — K52.9 COLITIS: Primary | ICD-10-CM

## 2025-02-15 DIAGNOSIS — R07.9 CHEST PAIN: ICD-10-CM

## 2025-02-15 DIAGNOSIS — K52.9 HEMORRHAGIC COLITIS: ICD-10-CM

## 2025-02-15 DIAGNOSIS — K92.2 GI BLEED: ICD-10-CM

## 2025-02-15 PROBLEM — I10 ESSENTIAL HYPERTENSION: Status: ACTIVE | Noted: 2025-02-15

## 2025-02-15 LAB
ABO + RH BLD: NORMAL
ALBUMIN SERPL BCP-MCNC: 3.4 G/DL (ref 3.5–5.2)
ALBUMIN SERPL BCP-MCNC: 3.4 G/DL (ref 3.5–5.2)
ALP SERPL-CCNC: 63 U/L (ref 55–135)
ALP SERPL-CCNC: 63 U/L (ref 55–135)
ALT SERPL W/O P-5'-P-CCNC: 17 U/L (ref 10–44)
ALT SERPL W/O P-5'-P-CCNC: 17 U/L (ref 10–44)
ANION GAP SERPL CALC-SCNC: 8 MMOL/L (ref 8–16)
ANION GAP SERPL CALC-SCNC: 8 MMOL/L (ref 8–16)
APTT PPP: 23.7 SEC (ref 21–32)
AST SERPL-CCNC: 13 U/L (ref 10–40)
AST SERPL-CCNC: 13 U/L (ref 10–40)
BASOPHILS # BLD AUTO: 0.03 K/UL (ref 0–0.2)
BASOPHILS # BLD AUTO: 0.03 K/UL (ref 0–0.2)
BASOPHILS NFR BLD: 0.2 % (ref 0–1.9)
BASOPHILS NFR BLD: 0.2 % (ref 0–1.9)
BILIRUB SERPL-MCNC: 0.4 MG/DL (ref 0.1–1)
BILIRUB SERPL-MCNC: 0.4 MG/DL (ref 0.1–1)
BLD GP AB SCN CELLS X3 SERPL QL: NORMAL
BUN SERPL-MCNC: 10 MG/DL (ref 6–20)
BUN SERPL-MCNC: 10 MG/DL (ref 6–20)
CALCIUM SERPL-MCNC: 8.5 MG/DL (ref 8.7–10.5)
CALCIUM SERPL-MCNC: 8.5 MG/DL (ref 8.7–10.5)
CHLORIDE SERPL-SCNC: 110 MMOL/L (ref 95–110)
CHLORIDE SERPL-SCNC: 110 MMOL/L (ref 95–110)
CO2 SERPL-SCNC: 26 MMOL/L (ref 23–29)
CO2 SERPL-SCNC: 26 MMOL/L (ref 23–29)
CREAT SERPL-MCNC: 1 MG/DL (ref 0.5–1.4)
CREAT SERPL-MCNC: 1 MG/DL (ref 0.5–1.4)
DIFFERENTIAL METHOD BLD: ABNORMAL
DIFFERENTIAL METHOD BLD: ABNORMAL
EOSINOPHIL # BLD AUTO: 0.1 K/UL (ref 0–0.5)
EOSINOPHIL # BLD AUTO: 0.1 K/UL (ref 0–0.5)
EOSINOPHIL NFR BLD: 1 % (ref 0–8)
EOSINOPHIL NFR BLD: 1 % (ref 0–8)
ERYTHROCYTE [DISTWIDTH] IN BLOOD BY AUTOMATED COUNT: 19.8 % (ref 11.5–14.5)
ERYTHROCYTE [DISTWIDTH] IN BLOOD BY AUTOMATED COUNT: 19.8 % (ref 11.5–14.5)
EST. GFR  (NO RACE VARIABLE): >60 ML/MIN/1.73 M^2
EST. GFR  (NO RACE VARIABLE): >60 ML/MIN/1.73 M^2
GLUCOSE SERPL-MCNC: 126 MG/DL (ref 70–110)
GLUCOSE SERPL-MCNC: 126 MG/DL (ref 70–110)
HCT VFR BLD AUTO: 33.4 % (ref 40–54)
HCT VFR BLD AUTO: 33.4 % (ref 40–54)
HGB BLD-MCNC: 9.9 G/DL (ref 14–18)
HGB BLD-MCNC: 9.9 G/DL (ref 14–18)
IMM GRANULOCYTES # BLD AUTO: 0.07 K/UL (ref 0–0.04)
IMM GRANULOCYTES # BLD AUTO: 0.07 K/UL (ref 0–0.04)
IMM GRANULOCYTES NFR BLD AUTO: 0.6 % (ref 0–0.5)
IMM GRANULOCYTES NFR BLD AUTO: 0.6 % (ref 0–0.5)
INR PPP: 1 (ref 0.8–1.2)
LIPASE SERPL-CCNC: 14 U/L (ref 4–60)
LYMPHOCYTES # BLD AUTO: 2.8 K/UL (ref 1–4.8)
LYMPHOCYTES # BLD AUTO: 2.8 K/UL (ref 1–4.8)
LYMPHOCYTES NFR BLD: 23 % (ref 18–48)
LYMPHOCYTES NFR BLD: 23 % (ref 18–48)
MCH RBC QN AUTO: 26.8 PG (ref 27–31)
MCH RBC QN AUTO: 26.8 PG (ref 27–31)
MCHC RBC AUTO-ENTMCNC: 29.6 G/DL (ref 32–36)
MCHC RBC AUTO-ENTMCNC: 29.6 G/DL (ref 32–36)
MCV RBC AUTO: 91 FL (ref 82–98)
MCV RBC AUTO: 91 FL (ref 82–98)
MONOCYTES # BLD AUTO: 0.7 K/UL (ref 0.3–1)
MONOCYTES # BLD AUTO: 0.7 K/UL (ref 0.3–1)
MONOCYTES NFR BLD: 5.4 % (ref 4–15)
MONOCYTES NFR BLD: 5.4 % (ref 4–15)
NEUTROPHILS # BLD AUTO: 8.5 K/UL (ref 1.8–7.7)
NEUTROPHILS # BLD AUTO: 8.5 K/UL (ref 1.8–7.7)
NEUTROPHILS NFR BLD: 69.8 % (ref 38–73)
NEUTROPHILS NFR BLD: 69.8 % (ref 38–73)
NRBC BLD-RTO: 0 /100 WBC
NRBC BLD-RTO: 0 /100 WBC
OB PNL STL: POSITIVE
PLATELET # BLD AUTO: 345 K/UL (ref 150–450)
PLATELET # BLD AUTO: 345 K/UL (ref 150–450)
PMV BLD AUTO: 9.7 FL (ref 9.2–12.9)
PMV BLD AUTO: 9.7 FL (ref 9.2–12.9)
POCT GLUCOSE: 141 MG/DL (ref 70–110)
POTASSIUM SERPL-SCNC: 3.7 MMOL/L (ref 3.5–5.1)
POTASSIUM SERPL-SCNC: 3.7 MMOL/L (ref 3.5–5.1)
PROT SERPL-MCNC: 6.4 G/DL (ref 6–8.4)
PROT SERPL-MCNC: 6.4 G/DL (ref 6–8.4)
PROTHROMBIN TIME: 11.6 SEC (ref 9–12.5)
RBC # BLD AUTO: 3.69 M/UL (ref 4.6–6.2)
RBC # BLD AUTO: 3.69 M/UL (ref 4.6–6.2)
SODIUM SERPL-SCNC: 144 MMOL/L (ref 136–145)
SODIUM SERPL-SCNC: 144 MMOL/L (ref 136–145)
SPECIMEN OUTDATE: NORMAL
WBC # BLD AUTO: 12.11 K/UL (ref 3.9–12.7)
WBC # BLD AUTO: 12.11 K/UL (ref 3.9–12.7)

## 2025-02-15 PROCEDURE — 96376 TX/PRO/DX INJ SAME DRUG ADON: CPT

## 2025-02-15 PROCEDURE — 63600175 PHARM REV CODE 636 W HCPCS: Performed by: NURSE PRACTITIONER

## 2025-02-15 PROCEDURE — 25500020 PHARM REV CODE 255: Performed by: EMERGENCY MEDICINE

## 2025-02-15 PROCEDURE — 80053 COMPREHEN METABOLIC PANEL: CPT | Performed by: EMERGENCY MEDICINE

## 2025-02-15 PROCEDURE — 93005 ELECTROCARDIOGRAM TRACING: CPT | Performed by: INTERNAL MEDICINE

## 2025-02-15 PROCEDURE — 96365 THER/PROPH/DIAG IV INF INIT: CPT

## 2025-02-15 PROCEDURE — 25000003 PHARM REV CODE 250: Performed by: NURSE PRACTITIONER

## 2025-02-15 PROCEDURE — 83690 ASSAY OF LIPASE: CPT | Performed by: EMERGENCY MEDICINE

## 2025-02-15 PROCEDURE — 85730 THROMBOPLASTIN TIME PARTIAL: CPT | Performed by: EMERGENCY MEDICINE

## 2025-02-15 PROCEDURE — 82272 OCCULT BLD FECES 1-3 TESTS: CPT | Performed by: EMERGENCY MEDICINE

## 2025-02-15 PROCEDURE — 85610 PROTHROMBIN TIME: CPT | Performed by: EMERGENCY MEDICINE

## 2025-02-15 PROCEDURE — 86901 BLOOD TYPING SEROLOGIC RH(D): CPT | Performed by: EMERGENCY MEDICINE

## 2025-02-15 PROCEDURE — 96366 THER/PROPH/DIAG IV INF ADDON: CPT

## 2025-02-15 PROCEDURE — 63600175 PHARM REV CODE 636 W HCPCS: Performed by: EMERGENCY MEDICINE

## 2025-02-15 PROCEDURE — 96361 HYDRATE IV INFUSION ADD-ON: CPT

## 2025-02-15 PROCEDURE — 25000003 PHARM REV CODE 250: Performed by: INTERNAL MEDICINE

## 2025-02-15 PROCEDURE — 99285 EMERGENCY DEPT VISIT HI MDM: CPT | Mod: 25

## 2025-02-15 PROCEDURE — G0378 HOSPITAL OBSERVATION PER HR: HCPCS

## 2025-02-15 PROCEDURE — 96375 TX/PRO/DX INJ NEW DRUG ADDON: CPT

## 2025-02-15 PROCEDURE — 25000003 PHARM REV CODE 250: Performed by: STUDENT IN AN ORGANIZED HEALTH CARE EDUCATION/TRAINING PROGRAM

## 2025-02-15 PROCEDURE — 85025 COMPLETE CBC W/AUTO DIFF WBC: CPT | Performed by: EMERGENCY MEDICINE

## 2025-02-15 RX ORDER — CIPROFLOXACIN 2 MG/ML
400 INJECTION, SOLUTION INTRAVENOUS
Status: DISCONTINUED | OUTPATIENT
Start: 2025-02-16 | End: 2025-02-17 | Stop reason: HOSPADM

## 2025-02-15 RX ORDER — CIPROFLOXACIN 2 MG/ML
400 INJECTION, SOLUTION INTRAVENOUS
Status: COMPLETED | OUTPATIENT
Start: 2025-02-15 | End: 2025-02-15

## 2025-02-15 RX ORDER — SODIUM,POTASSIUM PHOSPHATES 280-250MG
2 POWDER IN PACKET (EA) ORAL
Status: DISCONTINUED | OUTPATIENT
Start: 2025-02-15 | End: 2025-02-17 | Stop reason: HOSPADM

## 2025-02-15 RX ORDER — SODIUM CHLORIDE 9 MG/ML
INJECTION, SOLUTION INTRAVENOUS CONTINUOUS
Status: DISCONTINUED | OUTPATIENT
Start: 2025-02-15 | End: 2025-02-17

## 2025-02-15 RX ORDER — DIPHENHYDRAMINE HCL 25 MG
25 CAPSULE ORAL EVERY 12 HOURS PRN
Status: DISCONTINUED | OUTPATIENT
Start: 2025-02-15 | End: 2025-02-17 | Stop reason: HOSPADM

## 2025-02-15 RX ORDER — PANTOPRAZOLE SODIUM 40 MG/1
40 TABLET, DELAYED RELEASE ORAL 2 TIMES DAILY
Status: DISCONTINUED | OUTPATIENT
Start: 2025-02-15 | End: 2025-02-15

## 2025-02-15 RX ORDER — METRONIDAZOLE 500 MG/100ML
500 INJECTION, SOLUTION INTRAVENOUS
Status: DISCONTINUED | OUTPATIENT
Start: 2025-02-15 | End: 2025-02-17 | Stop reason: HOSPADM

## 2025-02-15 RX ORDER — ZOLPIDEM TARTRATE 5 MG/1
10 TABLET ORAL NIGHTLY PRN
Status: DISCONTINUED | OUTPATIENT
Start: 2025-02-15 | End: 2025-02-17 | Stop reason: HOSPADM

## 2025-02-15 RX ORDER — METHYLPREDNISOLONE SOD SUCC 125 MG
125 VIAL (EA) INJECTION
Status: DISCONTINUED | OUTPATIENT
Start: 2025-02-16 | End: 2025-02-15

## 2025-02-15 RX ORDER — ONDANSETRON HYDROCHLORIDE 2 MG/ML
8 INJECTION, SOLUTION INTRAVENOUS
Status: COMPLETED | OUTPATIENT
Start: 2025-02-15 | End: 2025-02-15

## 2025-02-15 RX ORDER — NALOXONE HCL 0.4 MG/ML
0.02 VIAL (ML) INJECTION
Status: DISCONTINUED | OUTPATIENT
Start: 2025-02-15 | End: 2025-02-17 | Stop reason: HOSPADM

## 2025-02-15 RX ORDER — ACETAMINOPHEN 325 MG/1
650 TABLET ORAL EVERY 4 HOURS PRN
Status: DISCONTINUED | OUTPATIENT
Start: 2025-02-15 | End: 2025-02-17 | Stop reason: HOSPADM

## 2025-02-15 RX ORDER — TOFACITINIB 10 MG/1
10 TABLET, FILM COATED ORAL 2 TIMES DAILY
COMMUNITY

## 2025-02-15 RX ORDER — METHYLPREDNISOLONE SOD SUCC 125 MG
125 VIAL (EA) INJECTION
Status: COMPLETED | OUTPATIENT
Start: 2025-02-15 | End: 2025-02-15

## 2025-02-15 RX ORDER — IBUPROFEN 200 MG
16 TABLET ORAL
Status: DISCONTINUED | OUTPATIENT
Start: 2025-02-15 | End: 2025-02-17 | Stop reason: HOSPADM

## 2025-02-15 RX ORDER — ONDANSETRON HYDROCHLORIDE 2 MG/ML
4 INJECTION, SOLUTION INTRAVENOUS EVERY 6 HOURS PRN
Status: DISCONTINUED | OUTPATIENT
Start: 2025-02-15 | End: 2025-02-17 | Stop reason: HOSPADM

## 2025-02-15 RX ORDER — LANOLIN ALCOHOL/MO/W.PET/CERES
800 CREAM (GRAM) TOPICAL
Status: DISCONTINUED | OUTPATIENT
Start: 2025-02-15 | End: 2025-02-17 | Stop reason: HOSPADM

## 2025-02-15 RX ORDER — PREGABALIN 25 MG/1
50 CAPSULE ORAL 2 TIMES DAILY
Status: DISCONTINUED | OUTPATIENT
Start: 2025-02-15 | End: 2025-02-17 | Stop reason: HOSPADM

## 2025-02-15 RX ORDER — AMOXICILLIN 250 MG
1 CAPSULE ORAL 2 TIMES DAILY PRN
Status: DISCONTINUED | OUTPATIENT
Start: 2025-02-15 | End: 2025-02-17 | Stop reason: HOSPADM

## 2025-02-15 RX ORDER — HYDROCODONE BITARTRATE AND ACETAMINOPHEN 5; 325 MG/1; MG/1
1 TABLET ORAL EVERY 6 HOURS PRN
Refills: 0 | Status: DISCONTINUED | OUTPATIENT
Start: 2025-02-15 | End: 2025-02-17 | Stop reason: HOSPADM

## 2025-02-15 RX ORDER — ESCITALOPRAM OXALATE 10 MG/1
20 TABLET ORAL DAILY
Status: DISCONTINUED | OUTPATIENT
Start: 2025-02-16 | End: 2025-02-17 | Stop reason: HOSPADM

## 2025-02-15 RX ORDER — IBUPROFEN 200 MG
24 TABLET ORAL
Status: DISCONTINUED | OUTPATIENT
Start: 2025-02-15 | End: 2025-02-17 | Stop reason: HOSPADM

## 2025-02-15 RX ORDER — MORPHINE SULFATE 4 MG/ML
4 INJECTION, SOLUTION INTRAMUSCULAR; INTRAVENOUS
Refills: 0 | Status: COMPLETED | OUTPATIENT
Start: 2025-02-15 | End: 2025-02-15

## 2025-02-15 RX ORDER — BUSPIRONE HYDROCHLORIDE 5 MG/1
15 TABLET ORAL 3 TIMES DAILY
Status: DISCONTINUED | OUTPATIENT
Start: 2025-02-15 | End: 2025-02-17 | Stop reason: HOSPADM

## 2025-02-15 RX ORDER — SODIUM CHLORIDE 0.9 % (FLUSH) 0.9 %
3 SYRINGE (ML) INJECTION EVERY 12 HOURS PRN
Status: DISCONTINUED | OUTPATIENT
Start: 2025-02-15 | End: 2025-02-17 | Stop reason: HOSPADM

## 2025-02-15 RX ORDER — METRONIDAZOLE 500 MG/100ML
500 INJECTION, SOLUTION INTRAVENOUS
Status: COMPLETED | OUTPATIENT
Start: 2025-02-15 | End: 2025-02-15

## 2025-02-15 RX ORDER — TALC
9 POWDER (GRAM) TOPICAL NIGHTLY PRN
Status: DISCONTINUED | OUTPATIENT
Start: 2025-02-15 | End: 2025-02-15

## 2025-02-15 RX ORDER — METOPROLOL SUCCINATE 50 MG/1
50 TABLET, EXTENDED RELEASE ORAL DAILY
Status: DISCONTINUED | OUTPATIENT
Start: 2025-02-16 | End: 2025-02-17 | Stop reason: HOSPADM

## 2025-02-15 RX ORDER — INSULIN ASPART 100 [IU]/ML
0-5 INJECTION, SOLUTION INTRAVENOUS; SUBCUTANEOUS
Status: DISCONTINUED | OUTPATIENT
Start: 2025-02-15 | End: 2025-02-17 | Stop reason: HOSPADM

## 2025-02-15 RX ORDER — ATORVASTATIN CALCIUM 10 MG/1
10 TABLET, FILM COATED ORAL DAILY
Status: DISCONTINUED | OUTPATIENT
Start: 2025-02-16 | End: 2025-02-17 | Stop reason: HOSPADM

## 2025-02-15 RX ORDER — FAMOTIDINE 20 MG/1
20 TABLET, FILM COATED ORAL 2 TIMES DAILY
Status: DISCONTINUED | OUTPATIENT
Start: 2025-02-15 | End: 2025-02-17 | Stop reason: HOSPADM

## 2025-02-15 RX ORDER — DIAZEPAM 5 MG/1
5 TABLET ORAL DAILY PRN
Status: DISCONTINUED | OUTPATIENT
Start: 2025-02-15 | End: 2025-02-17 | Stop reason: HOSPADM

## 2025-02-15 RX ORDER — GLUCAGON 1 MG
1 KIT INJECTION
Status: DISCONTINUED | OUTPATIENT
Start: 2025-02-15 | End: 2025-02-17 | Stop reason: HOSPADM

## 2025-02-15 RX ORDER — PREGABALIN 50 MG/1
1 CAPSULE ORAL 2 TIMES DAILY
COMMUNITY
Start: 2025-02-12 | End: 2026-02-12

## 2025-02-15 RX ADMIN — SODIUM CHLORIDE, POTASSIUM CHLORIDE, SODIUM LACTATE AND CALCIUM CHLORIDE 1000 ML: 600; 310; 30; 20 INJECTION, SOLUTION INTRAVENOUS at 02:02

## 2025-02-15 RX ADMIN — CIPROFLOXACIN 400 MG: 2 INJECTION, SOLUTION INTRAVENOUS at 04:02

## 2025-02-15 RX ADMIN — SODIUM CHLORIDE: 9 INJECTION, SOLUTION INTRAVENOUS at 05:02

## 2025-02-15 RX ADMIN — METRONIDAZOLE 500 MG: 500 INJECTION, SOLUTION INTRAVENOUS at 10:02

## 2025-02-15 RX ADMIN — BUSPIRONE HYDROCHLORIDE 15 MG: 5 TABLET ORAL at 07:02

## 2025-02-15 RX ADMIN — LACTOBACILLUS TAB 1 TABLET: TAB at 06:02

## 2025-02-15 RX ADMIN — IOHEXOL 100 ML: 350 INJECTION, SOLUTION INTRAVENOUS at 02:02

## 2025-02-15 RX ADMIN — ZOLPIDEM TARTRATE 10 MG: 5 TABLET, FILM COATED ORAL at 07:02

## 2025-02-15 RX ADMIN — FAMOTIDINE 20 MG: 20 TABLET, FILM COATED ORAL at 10:02

## 2025-02-15 RX ADMIN — ONDANSETRON 4 MG: 2 INJECTION INTRAMUSCULAR; INTRAVENOUS at 10:02

## 2025-02-15 RX ADMIN — HYDROCODONE BITARTRATE AND ACETAMINOPHEN 1 TABLET: 5; 325 TABLET ORAL at 07:02

## 2025-02-15 RX ADMIN — POTASSIUM BICARBONATE 50 MEQ: 978 TABLET, EFFERVESCENT ORAL at 10:02

## 2025-02-15 RX ADMIN — DIPHENHYDRAMINE HYDROCHLORIDE 25 MG: 25 CAPSULE ORAL at 11:02

## 2025-02-15 RX ADMIN — PANTOPRAZOLE SODIUM 40 MG: 40 TABLET, DELAYED RELEASE ORAL at 06:02

## 2025-02-15 RX ADMIN — MORPHINE SULFATE 4 MG: 4 INJECTION, SOLUTION INTRAMUSCULAR; INTRAVENOUS at 04:02

## 2025-02-15 RX ADMIN — METRONIDAZOLE 500 MG: 500 INJECTION, SOLUTION INTRAVENOUS at 03:02

## 2025-02-15 RX ADMIN — METHYLPREDNISOLONE SODIUM SUCCINATE 125 MG: 125 INJECTION, POWDER, FOR SOLUTION INTRAMUSCULAR; INTRAVENOUS at 02:02

## 2025-02-15 RX ADMIN — ONDANSETRON 8 MG: 2 INJECTION INTRAMUSCULAR; INTRAVENOUS at 02:02

## 2025-02-15 RX ADMIN — PREGABALIN 50 MG: 25 CAPSULE ORAL at 07:02

## 2025-02-15 RX ADMIN — DIAZEPAM 5 MG: 5 TABLET ORAL at 07:02

## 2025-02-15 NOTE — HPI
50 year old male with a past medical history of C. Diff colitis, cavitary pneumonia, diabetes, hypertension, hyperlipidemia, Ulcerative Colitis who present to the emergency room with diffuse abdominal pain, bloody diarrhea, nausea. Reports having 12 bowel movements with blood since last night. Denies fever and chills, dysuria, hematuria, focal weakness, malaise.     In the ER, no leukocytosis, anemia H/H 9.9/33.4, glucose 126, Ca 8.5, Albumin 3.4, Occult blood +, CT abdomen hepatomegaly, nonobstructing renal stones, colitis.   IV cipro and LR given in ER, IV methylprednisolone 125 mg IV given.     Admit to hospital medicine with Colitis, GI bleed.     Of Note:   Per Dr. Jean:   CHART REVIEW:   CT ABd 2/'25 - persistent colitis; s/p karina; otherwise nml abd  Colon 1/'25 - L sided colitis  Calpro 6750 1/'25  Tremfaya started 12/'24 due to lack fo response to Omvoh  Labs 11/'24 - Neg giardia / c. diff; calpro 1050  Calprotectin 9/'24 - increaing to 2310 despite Omvoh (Was decreasing initially)  Stool PCR - Negative.   Flex 8/'24 - chronic (improved) inflammation; CMV neg  EGD/colon 6/'24 - small HH; ampullary tic; mild ileitis (TI nml previously.?backwash?), L sided colitis  -reflux esophagitis. focal intestinal metaplasia antrum. Mild ileitis; sigmoid/proctitis; findings c/w UC; CMV negative in L sided biopsies  Labs 6/'24 -  Calprotectin 2480 on Omvoh at around 2 months. (Was >8K on entyvio but had near normalized on remicade)  Hospitalization 5/'24 - colitis on CT scan; Neg C. diff   Hospitalization 3/'24 - Calpro >8K; flex w/ severe L sided UC; placed on steroids. C. diff neg;....d/c entyvio as lack of response  Hospitalization 2/'24; CT 2/'24 - fatty liver; L sided colitis; Calpro back up ot 3070 (was 201 on remicade); Neg C. diff  Entyvio started 1/'24  Hospitalization 11/'23 - cavitary fungal lesions...Remicade stopped  Labs 10/'23 - C.diff negative after vanco and rebyota enemas.  Labs 7/'23 -   Calprotectin 201 on remicade w/ C. diff...tx w/ vanco w/ long taper +/- rebyota; Nml CRP much better on remicade  EGD 4/'23 - candidate esophagitis / duodenitis; HP neg gastirits  Hospitalization 4/'23 - colitis on CT; C. diff neg; Remicade loaded  Colon 3/'23 - Pan-colitis; Nml TI; s/p FMT for refractory C. diff  C. DIff + 3/'23  Labs 1/'23 - CRP 21; Stool PCR + C. DIff....  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

## 2025-02-15 NOTE — H&P
[facility] - Emergency Dept  Hospital Medicine  History & Physical    Patient Name: Jacoby Jean-Baptiste  MRN: [id number]  Patient Class: OP- Observation  Admission Date: 2/15/2025  Attending Physician: Noe Juarez MD   Primary Care Provider: Hunter Aguilar III, MD         Patient information was obtained from patient, past medical records, and ER records.     Subjective:     Principal Problem:GI bleed    Chief Complaint:   Chief Complaint   Patient presents with    Rectal Bleeding     X 1 DAY    Abdominal Pain        HPI: 50 year old male with a past medical history of C. Diff colitis, cavitary pneumonia, diabetes, hypertension, hyperlipidemia, Ulcerative Colitis who present to the emergency room with diffuse abdominal pain, bloody diarrhea, nausea. Reports having 12 bowel movements with blood since last night. Denies fever and chills, dysuria, hematuria, focal weakness, malaise.     In the ER, no leukocytosis, anemia H/H 9.9/33.4, glucose 126, Ca 8.5, Albumin 3.4, Occult blood +, CT abdomen hepatomegaly, nonobstructing renal stones, colitis.   IV cipro and LR given in ER, IV methylprednisolone 125 mg IV given.     Admit to hospital medicine with Colitis, GI bleed.     Of Note:   Per Dr. Jean:   CHART REVIEW:   CT ABd 2/'25 - persistent colitis; s/p karina; otherwise nml abd  Colon 1/'25 - L sided colitis  Calpro 6750 1/'25  Tremfaya started 12/'24 due to lack fo response to Omvoh  Labs 11/'24 - Neg giardia / c. diff; calpro 1050  Calprotectin 9/'24 - increaing to 2310 despite Omvoh (Was decreasing initially)  Stool PCR - Negative.   Flex 8/'24 - chronic (improved) inflammation; CMV neg  EGD/colon 6/'24 - small HH; ampullary tic; mild ileitis (TI nml previously.?backwash?), L sided colitis  -reflux esophagitis. focal intestinal metaplasia antrum. Mild ileitis; sigmoid/proctitis; findings c/w UC; CMV negative in L sided biopsies  Labs 6/'24 -  Calprotectin 2480 on Omvoh at around 2 months. (Was >8K  on entyvio but had near normalized on remicade)  Hospitalization 5/'24 - colitis on CT scan; Neg C. diff   Hospitalization 3/'24 - Calpro >8K; flex w/ severe L sided UC; placed on steroids. C. diff neg;....d/c entyvio as lack of response  Hospitalization 2/'24; CT 2/'24 - fatty liver; L sided colitis; Calpro back up ot 3070 (was 201 on remicade); Neg C. diff  Entyvio started 1/'24  Hospitalization 11/'23 - cavitary fungal lesions...Remicade stopped  Labs 10/'23 - C.diff negative after vanco and rebyota enemas.  Labs 7/'23 -  Calprotectin 201 on remicade w/ C. diff...tx w/ vanco w/ long taper +/- rebyota; Nml CRP much better on remicade  EGD 4/'23 - candidate esophagitis / duodenitis; HP neg gastirits  Hospitalization 4/'23 - colitis on CT; C. diff neg; Remicade loaded  Colon 3/'23 - Pan-colitis; Nml TI; s/p FMT for refractory C. diff  C. DIff + 3/'23  Labs 1/'23 - CRP 21; Stool PCR + C. DIff....  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     Past Medical History:   Diagnosis Date    C. difficile colitis     Cavitary pneumonia 11/2023    pos aspergillus serology    Diabetes mellitus 01/2022    Hyperlipidemia 2015    Hypertension 2015    Insomnia 2015    Ulcerative colitis        Past Surgical History:   Procedure Laterality Date    BRONCHOSCOPY WITH FLUOROSCOPY Left 11/20/2023    Procedure: BRONCHOSCOPY, WITH FLUOROSCOPY;  Surgeon: Lisa Villarreal MD;  Location: [facility];  Service: Pulmonary;  Laterality: Left;    BRONCHOSCOPY WITH FLUOROSCOPY N/A 11/30/2023    Procedure: BRONCHOSCOPY, WITH FLUOROSCOPY;  Surgeon: Lisa Villarreal MD;  Location: [facility];  Service: Pulmonary;  Laterality: N/A;    CARDIAC ELECTROPHYSIOLOGY MAPPING AND ABLATION  2017    SVT    CHOLECYSTECTOMY  2011    COLONOSCOPY N/A 5/3/2022    Procedure: COLONOSCOPY;  Surgeon: Shan Jean III, MD;  Location: [facility];  Service: Endoscopy;  Laterality: N/A;     COLONOSCOPY N/A 3/16/2024    Procedure: COLONOSCOPY;  Surgeon: ALEKSANDER Ramos MD;  Location: [facility];  Service: Endoscopy;  Laterality: N/A;    COLONOSCOPY N/A 1/17/2025    Procedure: COLONOSCOPY;  Surgeon: Russ Rowe MD;  Location: [facility];  Service: Endoscopy;  Laterality: N/A;    COLONOSCOPY WITH FECAL MICROBIOTA TRANSFER N/A 3/21/2023    Procedure: COLONOSCOPY, WITH FECAL MICROBIOTA TRANSFER;  Surgeon: David Millan MD;  Location: [facility];  Service: Endoscopy;  Laterality: N/A;    ESOPHAGOGASTRODUODENOSCOPY N/A 4/24/2023    Procedure: EGD (ESOPHAGOGASTRODUODENOSCOPY);  Surgeon: Shan Jean III, MD;  Location: [facility];  Service: Endoscopy;  Laterality: N/A;    ESOPHAGOGASTRODUODENOSCOPY N/A 6/5/2024    Procedure: EGD (ESOPHAGOGASTRODUODENOSCOPY);  Surgeon: Odalis Mccabe MD;  Location: [facility];  Service: Endoscopy;  Laterality: N/A;    FLEXIBLE SIGMOIDOSCOPY N/A 6/5/2024    Procedure: SIGMOIDOSCOPY, FLEXIBLE;  Surgeon: Odalis Mccabe MD;  Location: [facility];  Service: Endoscopy;  Laterality: N/A;    FLEXIBLE SIGMOIDOSCOPY N/A 7/16/2024    Procedure: SIGMOIDOSCOPY, FLEXIBLE;  Surgeon: Shan Jean III, MD;  Location: [facility];  Service: Endoscopy;  Laterality: N/A;    FLEXIBLE SIGMOIDOSCOPY N/A 8/13/2024    Procedure: SIGMOIDOSCOPY, FLEXIBLE;  Surgeon: Shan Jean III, MD;  Location: [facility];  Service: Endoscopy;  Laterality: N/A;    GANGLION CYST EXCISION Left 1992    UMBILICAL HERNIA REPAIR  2011    with mesh       Review of patient's allergies indicates:  No Known Allergies    Current Facility-Administered Medications on File Prior to Encounter   Medication    lactated ringers infusion     Current Outpatient Medications on File Prior to Encounter   Medication Sig    budesonide (ENTOCORT EC) 3 mg capsule Take 3 capsules (9 mg total) by mouth once daily.    busPIRone (BUSPAR) 15 MG tablet Take 1 tablet (15 mg total) by mouth 3 (three) times  daily.    diazePAM (VALIUM) 5 MG tablet Take 1 tablet (5 mg total) by mouth daily as needed for Anxiety.    diphenoxylate-atropine 2.5-0.025 mg (LOMOTIL) 2.5-0.025 mg per tablet Take 1 tablet by mouth 3 (three) times daily as needed for Diarrhea.    EScitalopram oxalate (LEXAPRO) 20 MG tablet Take 1 tablet (20 mg total) by mouth once daily.    Lactobac 2-Bifido 1-S. therm (VSL#3) 112.5 billion cell Cap Take 1 capsule by mouth twice a day    metoprolol succinate (TOPROL-XL) 50 MG 24 hr tablet Take 1 tablet (50 mg total) by mouth once daily.    pantoprazole (PROTONIX) 40 MG tablet Take 40 mg by mouth 2 (two) times daily.    pregabalin (LYRICA) 50 MG capsule Take 1 capsule by mouth 2 (two) times daily.    promethazine (PHENERGAN) 25 MG tablet Take 25 mg by mouth 4 (four) times daily as needed for Nausea.    rivaroxaban (XARELTO) 20 mg Tab Take 1 tablet (20 mg total) by mouth daily with dinner or evening meal.    simvastatin (ZOCOR) 20 MG tablet Take 1 tablet (20 mg total) by mouth every evening.    tofacitinib (XELJANZ) 10 mg Tab Take 10 mg by mouth 2 (two) times daily.    zolpidem (AMBIEN) 10 mg Tab Take 1 tablet (10 mg total) by mouth nightly as needed (insomnia).    [DISCONTINUED] bezlotoxumab (ZINPLAVA) 25 mg/mL Soln injection Inject 10 mg/kg intravenously single dose, during antibacterial treatment (Patient not taking: Reported on 2/12/2025)    [DISCONTINUED] fidaxomicin (DIFICID) 200 mg Tab Take 1 tablet by mouth twice a day for 10 days (Patient not taking: Reported on 2/12/2025)    [DISCONTINUED] HYDROcodone-acetaminophen (NORCO) 5-325 mg per tablet Take 1 tablet by mouth four times a day as needed for pain    [DISCONTINUED] hyoscyamine (LEVBID) 0.375 mg Tb12 Take 0.375 mg by mouth 2 (two) times daily.    [DISCONTINUED] magnesium oxide (MAG-OX) 400 mg (241.3 mg magnesium) tablet Take 1 tablet (400 mg total) by mouth 2 (two) times daily.    [DISCONTINUED] methylPREDNISolone (MEDROL) 4 MG Tab Take 4 tablets (16 mg  total) by mouth once daily for 5 days, THEN 2 tablets (8 mg total) once daily for 5 days, THEN 1 tablet (4 mg total) once daily for 5 days, THEN 1/2 tablet by mouth daily for 5 days. (Patient not taking: Reported on 2/12/2025)     Family History       Problem Relation (Age of Onset)    Asthma Mother          Tobacco Use    Smoking status: Former     Average packs/day: 1 pack/day for 30.0 years (30.0 ttl pk-yrs)     Types: Cigarettes     Start date: 1993    Smokeless tobacco: Never    Tobacco comments:     Pt states he has stopped smoking with Chantix three weeks ago. 12/1/23   Substance and Sexual Activity    Alcohol use: Yes     Comment: ocass    Drug use: Not Currently    Sexual activity: Not Currently     Review of Systems   Constitutional:  Positive for fatigue.   HENT: Negative.     Respiratory: Negative.     Cardiovascular: Negative.    Gastrointestinal:  Positive for abdominal pain, blood in stool, diarrhea and nausea.   Genitourinary: Negative.    Musculoskeletal: Negative.    Skin: Negative.    Neurological: Negative.      Objective:     Vital Signs (Most Recent):  Temp: 98 °F (36.7 °C) (02/15/25 1333)  Pulse: 93 (02/15/25 1333)  Resp: 19 (02/15/25 1333)  BP: 129/67 (02/15/25 1333)  SpO2: 100 % (02/15/25 1333) Vital Signs (24h Range):  Temp:  [98 °F (36.7 °C)] 98 °F (36.7 °C)  Pulse:  [93] 93  Resp:  [19] 19  SpO2:  [100 %] 100 %  BP: (129)/(67) 129/67     Weight: 132.9 kg (293 lb)  Body mass index is 47.29 kg/m².     Physical Exam  Vitals reviewed.   Constitutional:       General: He is not in acute distress.     Appearance: Normal appearance.   HENT:      Head: Normocephalic and atraumatic.      Mouth/Throat:      Mouth: Mucous membranes are moist.   Eyes:      Extraocular Movements: Extraocular movements intact.      Pupils: Pupils are equal, round, and reactive to light.   Cardiovascular:      Rate and Rhythm: Normal rate.      Pulses: Normal pulses.   Pulmonary:      Effort: Pulmonary effort is normal.  No respiratory distress.      Breath sounds: Normal breath sounds.   Abdominal:      General: Bowel sounds are normal. There is no distension.      Palpations: Abdomen is soft.      Tenderness: There is no abdominal tenderness.   Musculoskeletal:         General: No swelling. Normal range of motion.      Cervical back: Normal range of motion and neck supple.   Skin:     General: Skin is warm and dry.      Capillary Refill: Capillary refill takes less than 2 seconds.   Neurological:      General: No focal deficit present.      Mental Status: He is alert. Mental status is at baseline.   Psychiatric:         Mood and Affect: Mood normal.              CRANIAL NERVES     CN III, IV, VI   Pupils are equal, round, and reactive to light.       Significant Labs: All pertinent labs within the past 24 hours have been reviewed.  Recent Lab Results         02/15/25  1355   02/15/25  1353        Albumin   3.4          3.4       ALP   63          63       ALT   17          17       Anion Gap   8          8       PTT   23.7  Comment: Refer to local heparin nomogram for intensity/dose specific   therapeutic   range.         AST   13          13       Baso #   0.03          0.03       Basophil %   0.2          0.2       BILIRUBIN TOTAL   0.4  Comment: For infants and newborns, interpretation of results should be based  on gestational age, weight and in agreement with clinical  observations.    Premature Infant recommended reference ranges:  Up to 24 hours.............<8.0 mg/dL  Up to 48 hours............<12.0 mg/dL  3-5 days..................<15.0 mg/dL  6-29 days.................<15.0 mg/dL            0.4  Comment: For infants and newborns, interpretation of results should be based  on gestational age, weight and in agreement with clinical  observations.    Premature Infant recommended reference ranges:  Up to 24 hours.............<8.0 mg/dL  Up to 48 hours............<12.0 mg/dL  3-5 days..................<15.0 mg/dL  6-29  days.................<15.0 mg/dL         BUN   10          10       Calcium   8.5          8.5       Chloride   110          110       CO2   26          26       Creatinine   1.0          1.0       Differential Method   Automated          Automated       eGFR   >60.0          >60.0       Eos #   0.1          0.1       Eos %   1.0          1.0       Glucose   126          126       Gran # (ANC)   8.5          8.5       Gran %   69.8          69.8       Group & Rh   A POS       Hematocrit   33.4          33.4       Hemoglobin   9.9          9.9       Immature Grans (Abs)   0.07  Comment: Mild elevation in immature granulocytes is non specific and   can be seen in a variety of conditions including stress response,   acute inflammation, trauma and pregnancy. Correlation with other   laboratory and clinical findings is essential.            0.07  Comment: Mild elevation in immature granulocytes is non specific and   can be seen in a variety of conditions including stress response,   acute inflammation, trauma and pregnancy. Correlation with other   laboratory and clinical findings is essential.         Immature Granulocytes   0.6          0.6       INDIRECT FLACO   NEG       INR   1.0  Comment: Coumadin Therapy:  2.0 - 3.0 for INR for all indicators except mechanical heart valves  and antiphospholipid syndromes which should use 2.5 - 3.5.         Lipase   14       Lymph #   2.8          2.8       Lymph %   23.0          23.0       MCH   26.8          26.8       MCHC   29.6          29.6       MCV   91          91       Mono #   0.7          0.7       Mono %   5.4          5.4       MPV   9.7          9.7       nRBC   0          0       Occult Blood Positive         Platelet Count   345          345       Potassium   3.7          3.7       PROTEIN TOTAL   6.4          6.4       PT   11.6       RBC   3.69          3.69       RDW   19.8          19.8       Sodium   144          144       Specimen Outdate   02/18/2025  23:59       WBC   12.11          12.11               Significant Imaging: I have reviewed all pertinent imaging results/findings within the past 24 hours.  Imaging Results              CT Abdomen Pelvis With IV Contrast NO Oral Contrast (Final result)  Result time 02/15/25 15:14:40      Final result by Adrian Lynch MD (02/15/25 15:14:40)                   Impression:      1.  Mild long segment circumferential colonic wall thickening with fat stranding in the descending and rectosigmoid colon suggestive of colitis, without finding of bowel obstruction, intra-abdominal free air or abscess (likely infectious/inflammatory and less likely from vasculitis, ischemia or lymphoma).  Consider correlation with the symptoms, history and interval follow-up as occult malignancy is not excluded.    2.  Numerous bilateral, left greater than right nonobstructing renal stones.    3.  Hepatomegaly background parenchymal findings of steatosis.    4.  Additional, and incidental findings without adverse interval change from the previous.      Electronically signed by: Adrian Lynch  Date:    02/15/2025  Time:    15:14               Narrative:      CMS MANDATED QUALITY DATA - CT RADIATION - 436    All CT scans at this facility utilize dose modulation, iterative reconstruction, and/or weight based dosing when appropriate to reduce radiation dose to as low as reasonably achievable.    CLINICAL HISTORY:  ([id number])51 y/o  (1975) M    Abdominal abscess/infection suspected;    TECHNIQUE:  (A#[id number], exam time 2/15/2025 14:59)    CT ABDOMEN PELVIS WITH IV CONTRAST EAT998    Axial CT images of the abdomen and pelvis were obtained from the dome of the diaphragm to the proximal thighs.    COMPARISON:  CT from 02/02/2025.    FINDINGS:  Lower Thorax:    The lung bases are clear. The heart is mildly enlarged in size.    CT Abdomen:    Liver: Relative diffuse low-attenuation liver consistent with hepatic steatosis.  The liver is  enlarged measuring 20.5 cm sagittal right lobe.    Gallbladder: Surgically absent.    Biliary Tree: No intra or extrahepatic ductal dilation.    Spleen: Normal.    Pancreas: There is a large periampullary duodenal diverticulum.  No discrete pancreatic mass, ductal dilation or differential enhancement is seen to suggest malignancy.  No peripancreatic fat stranding or fluid is seen to suggest acute pancreatitis.    Adrenal Glands: Normal    Kidneys: No hydronephrosis/hydroureter or evidence of obstructive uropathy.  Numerous scattered, left greater than right, bilateral nonobstructing renal stones are present, with ten 3-5 mm stones on the left and three 1-3 mm stones on the right    Vasculature: Normal.    Lymph nodes: No abdominal lymphadenopathy is seen.    Intraperitoneal structures: There is no ascites.    Bowel: Mild long segment circumferential colonic wall thickening is seen in the descending and sigmoid colon with adjacent fat stranding (image 174).  No finding of obstruction, intra-abdominal free air or abscess.    Abdominal wall: The abdominal wall and musculature are normal.    Musculoskeletal: No acute osseous abnormality is identified.    CT Pelvis:    Bladder: Normal.    Reproductive Organs: The prostate and seminal vesicles are within normal limits.    Pelvic Lymph nodes: No pelvic lymphadenopathy is identified.                                      Assessment/Plan:     * Lower GI bleed secondary to colitis  Anemia due to blood loss secondary to GI bleed.     Patient's hemorrhage is due to gastrointestinal bleed, this bleeding is associated with an anticoagulant, the anticoagulant is Select Anticoagulant(s): Direct oral anticoagulant: Rivaroxaban (Xarelto). Patients most recent Hgb, Hct, platelets, and INR are listed below.  Recent Labs     02/15/25  1353   HGB 9.9*  9.9*   HCT 33.4*  33.4*     345   INR 1.0     Plan  - Will trend hemoglobin/hematocrit Daily  - Will monitor and correct any  "coagulation defects  - Will transfuse if Hgb is <7g/dl (<8g/dl in cases of active ACS) or if patient has rapid bleeding leading to hemodynamic instability  - hold xarelto      Ulcerative colitis  Versus IC with bloody diarrhea,   Hx of fecal transplant, proctocolitis    Consult GI  IV hydration, pain control  Clear liquids  Continue w/ budesonide  Continue Tremfaya (not on formulary- may take home med   Continue Xeljanz 10 mg twice daily.   Continue Lyrica  Patient is scheduled for surgery March 6.    Continue IV Methylpednisolone  Start cipro and metronidazole            Essential hypertension  Patient's blood pressure range in the last 24 hours was: BP  Min: 129/67  Max: 129/67.The patient's inpatient anti-hypertensive regimen is listed below:  Current Antihypertensives       Plan  - BP is controlled, no changes needed to their regimen      Type 2 diabetes mellitus without complication, without long-term current use of insulin  Patient's FSGs are uncontrolled due to hyperglycemia on current medication regimen.  Last A1c reviewed-   Lab Results   Component Value Date    HGBA1C 5.8 11/02/2024     Most recent fingerstick glucose reviewed- No results for input(s): "POCTGLUCOSE" in the last 24 hours.  Current correctional scale  Low  Maintain anti-hyperglycemic dose as follows-   Antihyperglycemics (From admission, onward)      Start     Stop Route Frequency Ordered    02/15/25 1703  insulin aspart U-100 pen 0-5 Units         -- SubQ Before meals & nightly PRN 02/15/25 1603          Hold Oral hypoglycemics while patient is in the hospital.          VTE Risk Mitigation (From admission, onward)           Ordered     Reason for No Pharmacological VTE Prophylaxis  Once        Question:  Reasons:  Answer:  Active Bleeding    02/15/25 1603     IP VTE HIGH RISK PATIENT  Once         02/15/25 1603     Place sequential compression device  Until discontinued         02/15/25 1603                         On 02/15/2025, patient " should be placed in hospital observation services under my care in collaboration with Dr. Juarez.           Jillian Lee, NP  Department of Hospital Medicine  [facility] - Emergency Dept

## 2025-02-15 NOTE — ASSESSMENT & PLAN NOTE
Anemia due to blood loss secondary to GI bleed.     Patient's hemorrhage is due to gastrointestinal bleed, this bleeding is associated with an anticoagulant, the anticoagulant is Select Anticoagulant(s): Direct oral anticoagulant: Rivaroxaban (Xarelto). Patients most recent Hgb, Hct, platelets, and INR are listed below.  Recent Labs     02/15/25  1353   HGB 9.9*  9.9*   HCT 33.4*  33.4*     345   INR 1.0     Plan  - Will trend hemoglobin/hematocrit Daily  - Will monitor and correct any coagulation defects  - Will transfuse if Hgb is <7g/dl (<8g/dl in cases of active ACS) or if patient has rapid bleeding leading to hemodynamic instability  - hold xarelto

## 2025-02-15 NOTE — ASSESSMENT & PLAN NOTE
"Patient's FSGs are uncontrolled due to hyperglycemia on current medication regimen.  Last A1c reviewed-   Lab Results   Component Value Date    HGBA1C 5.8 11/02/2024     Most recent fingerstick glucose reviewed- No results for input(s): "POCTGLUCOSE" in the last 24 hours.  Current correctional scale  Low  Maintain anti-hyperglycemic dose as follows-   Antihyperglycemics (From admission, onward)      Start     Stop Route Frequency Ordered    02/15/25 1703  insulin aspart U-100 pen 0-5 Units         -- SubQ Before meals & nightly PRN 02/15/25 1603          Hold Oral hypoglycemics while patient is in the hospital.      "

## 2025-02-15 NOTE — ASSESSMENT & PLAN NOTE
Assessment/Plan:    No problem-specific Assessment & Plan notes found for this encounter.    Right TMJ  Suggest ice  F/u or dentist if no better  Avoid nsaids with Eliquis    S/p AF with RVR  Back in NSR  F/u cardiology  Aware to avoid nsaids on eliquis    HLD stable on statin  Last     Htn stable  Norvasc 5mg was stopped  Toprol 50mg qd increased to bid  Continue same    Since the hospital you should not take amlodipine 5mg once a day anymore but increase the metoprolol 50mg once a day to twice a day.  You can exercise since your stress test was normal.  Also make sure you follow up with your cardiologist Dr Hernandez.    Since you are on a blood thinner Eliquis, you can take tylenol if needed but do not take ibuprofen (advil, motrin) or naproxen (aleve) since it can cause excessive bleeding.       Diagnoses and all orders for this visit:    PAF (paroxysmal atrial fibrillation) (HCC)  -     metoprolol tartrate (LOPRESSOR) 50 mg tablet; Take 1 tablet (50 mg total) by mouth every 12 (twelve) hours    Atrial fibrillation with rapid ventricular response (HCC)  -     apixaban (ELIQUIS) 5 mg; Take 1 tablet (5 mg total) by mouth 2 (two) times a day    Atrial fibrillation with RVR (HCC)    Other hyperlipidemia  -     atorvastatin (LIPITOR) 20 mg tablet; Take 1 tablet (20 mg total) by mouth daily with dinner        Return in about 6 months (around 4/23/2025) for Recheck.    Subjective:      Patient ID: Regino Huffman is a 73 y.o. male.    Chief Complaint   Patient presents with    Transition of Care Management     [id number]       HPI  Hx of PAF  No trigger known  Back to NSR  Tolerates eliquis, new  Toprol increased  Norvasc stopped    TCM Call       Date and time call was made  10/15/2024  1:22 PM    Hospital care reviewed  Records reviewed    Patient was hospitialized at  [facility]    Date of Admission  10/11/24    Date of discharge  10/14/24    Diagnosis  Atrial fibrillation with RVR    Disposition  Home    Were  Patient's blood pressure range in the last 24 hours was: BP  Min: 129/67  Max: 129/67.The patient's inpatient anti-hypertensive regimen is listed below:  Current Antihypertensives       Plan  - BP is controlled, no changes needed to their regimen     the patients medications reviewed and updated  No  Regino's daughter assists him and knows to call if any questions    Current Symptoms  None          TCM Call       Post hospital issues  None    Should patient be enrolled in anticoag monitoring?  No    Scheduled for follow up?  Yes    Patients specialists  Cardiologist    Cardiologist name  Dr Segal    Did you obtain your prescribed medications  Yes    Do you need help managing your prescriptions or medications  Yes    Why type of assitance do you need  Daughter manages    Is transportation to your appointment needed  No    I have advised the patient to call PCP with any new or worsening symptoms  Aurelio Wilhelm    Living Arrangements  Children    Support System  Family    The type of support provided  Physical; Emotional    Do you have social support  Yes, as much as I need    Are you recieving any outpatient services  Yes    What type of services  PT    Are you recieving home care services  No    Are you using any community resources  No    Current waiver services  No    Have you fallen in the last 12 months  No    Interperter language line needed  No    Counseling  Family    Counseling topics  Activities of daily living; patient and family education; Importance of RX compliance; Risk factor reduction; instructions for management    Comments  I spoke with Regino's daughter Danielle who states that he is doing ok. He lives with his daughter. He does not speak english well, as per his daughter. SHe will have him update our communication consent form and is aware we will provide a translater for him at his appt. She knows to call if any questions/concerns Oxana DE DIOS                The following portions of the patient's history were reviewed and updated as appropriate: allergies, current medications, past family history, past medical history, past social history, past surgical history and problem list.    Review of Systems   Cardiovascular:  Negative for chest pain  "and palpitations.   Gastrointestinal:  Negative for blood in stool.         Current Outpatient Medications   Medication Sig Dispense Refill    apixaban (ELIQUIS) 5 mg Take 1 tablet (5 mg total) by mouth 2 (two) times a day 180 tablet 1    atorvastatin (LIPITOR) 20 mg tablet Take 1 tablet (20 mg total) by mouth daily with dinner 90 tablet 1    Calcium Citrate-Vitamin D 250 mg-2.5 mcg tablet Take 1 tablet by mouth 2 (two) times a day      metoprolol tartrate (LOPRESSOR) 50 mg tablet Take 1 tablet (50 mg total) by mouth every 12 (twelve) hours 180 tablet 1     No current facility-administered medications for this visit.       Objective:    /80   Pulse 64   Temp (!) 97.4 °F (36.3 °C)   Resp 18   Ht 5' 6\" (1.676 m)   Wt 73.9 kg (163 lb)   SpO2 98%   BMI 26.31 kg/m²        Physical Exam  Vitals and nursing note reviewed.   Constitutional:       General: He is not in acute distress.     Appearance: He is well-developed. He is not ill-appearing.   HENT:      Head: Normocephalic.      Right Ear: Tympanic membrane normal.      Left Ear: Tympanic membrane normal.      Mouth/Throat:      Mouth: Mucous membranes are moist.   Eyes:      Conjunctiva/sclera: Conjunctivae normal.   Cardiovascular:      Rate and Rhythm: Normal rate and regular rhythm.   Pulmonary:      Effort: Pulmonary effort is normal. No respiratory distress.      Breath sounds: No wheezing or rales.   Abdominal:      Palpations: Abdomen is soft.   Musculoskeletal:         General: No deformity.      Cervical back: Neck supple.      Right lower leg: No edema.      Left lower leg: No edema.   Skin:     General: Skin is warm and dry.      Coloration: Skin is not pale.   Neurological:      Mental Status: He is alert.   Psychiatric:         Behavior: Behavior normal.         Thought Content: Thought content normal.                Blanco Rojo, DO    "

## 2025-02-15 NOTE — SUBJECTIVE & OBJECTIVE
Past Medical History:   Diagnosis Date    C. difficile colitis     Cavitary pneumonia 11/2023    pos aspergillus serology    Diabetes mellitus 01/2022    Hyperlipidemia 2015    Hypertension 2015    Insomnia 2015    Ulcerative colitis        Past Surgical History:   Procedure Laterality Date    BRONCHOSCOPY WITH FLUOROSCOPY Left 11/20/2023    Procedure: BRONCHOSCOPY, WITH FLUOROSCOPY;  Surgeon: Lisa Villarreal MD;  Location: [facility];  Service: Pulmonary;  Laterality: Left;    BRONCHOSCOPY WITH FLUOROSCOPY N/A 11/30/2023    Procedure: BRONCHOSCOPY, WITH FLUOROSCOPY;  Surgeon: Lisa Villarreal MD;  Location: [facility];  Service: Pulmonary;  Laterality: N/A;    CARDIAC ELECTROPHYSIOLOGY MAPPING AND ABLATION  2017    SVT    CHOLECYSTECTOMY  2011    COLONOSCOPY N/A 5/3/2022    Procedure: COLONOSCOPY;  Surgeon: Shan Jean III, MD;  Location: [facility];  Service: Endoscopy;  Laterality: N/A;    COLONOSCOPY N/A 3/16/2024    Procedure: COLONOSCOPY;  Surgeon: ALEKSANDER Ramos MD;  Location: [facility];  Service: Endoscopy;  Laterality: N/A;    COLONOSCOPY N/A 1/17/2025    Procedure: COLONOSCOPY;  Surgeon: Russ Rowe MD;  Location: [facility];  Service: Endoscopy;  Laterality: N/A;    COLONOSCOPY WITH FECAL MICROBIOTA TRANSFER N/A 3/21/2023    Procedure: COLONOSCOPY, WITH FECAL MICROBIOTA TRANSFER;  Surgeon: David Millan MD;  Location: [facility];  Service: Endoscopy;  Laterality: N/A;    ESOPHAGOGASTRODUODENOSCOPY N/A 4/24/2023    Procedure: EGD (ESOPHAGOGASTRODUODENOSCOPY);  Surgeon: Shan Jean III, MD;  Location: [facility];  Service: Endoscopy;  Laterality: N/A;    ESOPHAGOGASTRODUODENOSCOPY N/A 6/5/2024    Procedure: EGD (ESOPHAGOGASTRODUODENOSCOPY);  Surgeon: Odalis Mccabe MD;  Location: [facility];  Service: Endoscopy;  Laterality: N/A;    FLEXIBLE SIGMOIDOSCOPY N/A 6/5/2024    Procedure: SIGMOIDOSCOPY, FLEXIBLE;  Surgeon: Odalis Mccabe MD;  Location: [facility];  Service:  Endoscopy;  Laterality: N/A;    FLEXIBLE SIGMOIDOSCOPY N/A 7/16/2024    Procedure: SIGMOIDOSCOPY, FLEXIBLE;  Surgeon: Shan Jean III, MD;  Location: [facility];  Service: Endoscopy;  Laterality: N/A;    FLEXIBLE SIGMOIDOSCOPY N/A 8/13/2024    Procedure: SIGMOIDOSCOPY, FLEXIBLE;  Surgeon: Shan Jean III, MD;  Location: [facility];  Service: Endoscopy;  Laterality: N/A;    GANGLION CYST EXCISION Left 1992    UMBILICAL HERNIA REPAIR  2011    with mesh       Review of patient's allergies indicates:  No Known Allergies    Current Facility-Administered Medications on File Prior to Encounter   Medication    lactated ringers infusion     Current Outpatient Medications on File Prior to Encounter   Medication Sig    budesonide (ENTOCORT EC) 3 mg capsule Take 3 capsules (9 mg total) by mouth once daily.    busPIRone (BUSPAR) 15 MG tablet Take 1 tablet (15 mg total) by mouth 3 (three) times daily.    diazePAM (VALIUM) 5 MG tablet Take 1 tablet (5 mg total) by mouth daily as needed for Anxiety.    diphenoxylate-atropine 2.5-0.025 mg (LOMOTIL) 2.5-0.025 mg per tablet Take 1 tablet by mouth 3 (three) times daily as needed for Diarrhea.    EScitalopram oxalate (LEXAPRO) 20 MG tablet Take 1 tablet (20 mg total) by mouth once daily.    Lactobac 2-Bifido 1-S. therm (VSL#3) 112.5 billion cell Cap Take 1 capsule by mouth twice a day    metoprolol succinate (TOPROL-XL) 50 MG 24 hr tablet Take 1 tablet (50 mg total) by mouth once daily.    pantoprazole (PROTONIX) 40 MG tablet Take 40 mg by mouth 2 (two) times daily.    pregabalin (LYRICA) 50 MG capsule Take 1 capsule by mouth 2 (two) times daily.    promethazine (PHENERGAN) 25 MG tablet Take 25 mg by mouth 4 (four) times daily as needed for Nausea.    rivaroxaban (XARELTO) 20 mg Tab Take 1 tablet (20 mg total) by mouth daily with dinner or evening meal.    simvastatin (ZOCOR) 20 MG tablet Take 1 tablet (20 mg total) by mouth every evening.    tofacitinib (XELJANZ) 10 mg  Tab Take 10 mg by mouth 2 (two) times daily.    zolpidem (AMBIEN) 10 mg Tab Take 1 tablet (10 mg total) by mouth nightly as needed (insomnia).    [DISCONTINUED] bezlotoxumab (ZINPLAVA) 25 mg/mL Soln injection Inject 10 mg/kg intravenously single dose, during antibacterial treatment (Patient not taking: Reported on 2/12/2025)    [DISCONTINUED] fidaxomicin (DIFICID) 200 mg Tab Take 1 tablet by mouth twice a day for 10 days (Patient not taking: Reported on 2/12/2025)    [DISCONTINUED] HYDROcodone-acetaminophen (NORCO) 5-325 mg per tablet Take 1 tablet by mouth four times a day as needed for pain    [DISCONTINUED] hyoscyamine (LEVBID) 0.375 mg Tb12 Take 0.375 mg by mouth 2 (two) times daily.    [DISCONTINUED] magnesium oxide (MAG-OX) 400 mg (241.3 mg magnesium) tablet Take 1 tablet (400 mg total) by mouth 2 (two) times daily.    [DISCONTINUED] methylPREDNISolone (MEDROL) 4 MG Tab Take 4 tablets (16 mg total) by mouth once daily for 5 days, THEN 2 tablets (8 mg total) once daily for 5 days, THEN 1 tablet (4 mg total) once daily for 5 days, THEN 1/2 tablet by mouth daily for 5 days. (Patient not taking: Reported on 2/12/2025)     Family History       Problem Relation (Age of Onset)    Asthma Mother          Tobacco Use    Smoking status: Former     Average packs/day: 1 pack/day for 30.0 years (30.0 ttl pk-yrs)     Types: Cigarettes     Start date: 1993    Smokeless tobacco: Never    Tobacco comments:     Pt states he has stopped smoking with Chantix three weeks ago. 12/1/23   Substance and Sexual Activity    Alcohol use: Yes     Comment: ocass    Drug use: Not Currently    Sexual activity: Not Currently     Review of Systems   Constitutional:  Positive for fatigue.   HENT: Negative.     Respiratory: Negative.     Cardiovascular: Negative.    Gastrointestinal:  Positive for abdominal pain, blood in stool, diarrhea and nausea.   Genitourinary: Negative.    Musculoskeletal: Negative.    Skin: Negative.    Neurological:  Negative.      Objective:     Vital Signs (Most Recent):  Temp: 98 °F (36.7 °C) (02/15/25 1333)  Pulse: 93 (02/15/25 1333)  Resp: 19 (02/15/25 1333)  BP: 129/67 (02/15/25 1333)  SpO2: 100 % (02/15/25 1333) Vital Signs (24h Range):  Temp:  [98 °F (36.7 °C)] 98 °F (36.7 °C)  Pulse:  [93] 93  Resp:  [19] 19  SpO2:  [100 %] 100 %  BP: (129)/(67) 129/67     Weight: 132.9 kg (293 lb)  Body mass index is 47.29 kg/m².     Physical Exam  Vitals reviewed.   Constitutional:       General: He is not in acute distress.     Appearance: Normal appearance.   HENT:      Head: Normocephalic and atraumatic.      Mouth/Throat:      Mouth: Mucous membranes are moist.   Eyes:      Extraocular Movements: Extraocular movements intact.      Pupils: Pupils are equal, round, and reactive to light.   Cardiovascular:      Rate and Rhythm: Normal rate.      Pulses: Normal pulses.   Pulmonary:      Effort: Pulmonary effort is normal. No respiratory distress.      Breath sounds: Normal breath sounds.   Abdominal:      General: Bowel sounds are normal. There is no distension.      Palpations: Abdomen is soft.      Tenderness: There is no abdominal tenderness.   Musculoskeletal:         General: No swelling. Normal range of motion.      Cervical back: Normal range of motion and neck supple.   Skin:     General: Skin is warm and dry.      Capillary Refill: Capillary refill takes less than 2 seconds.   Neurological:      General: No focal deficit present.      Mental Status: He is alert. Mental status is at baseline.   Psychiatric:         Mood and Affect: Mood normal.              CRANIAL NERVES     CN III, IV, VI   Pupils are equal, round, and reactive to light.       Significant Labs: All pertinent labs within the past 24 hours have been reviewed.  Recent Lab Results         02/15/25  1355   02/15/25  1353        Albumin   3.4          3.4       ALP   63          63       ALT   17          17       Anion Gap   8          8       PTT    23.7  Comment: Refer to local heparin nomogram for intensity/dose specific   therapeutic   range.         AST   13          13       Baso #   0.03          0.03       Basophil %   0.2          0.2       BILIRUBIN TOTAL   0.4  Comment: For infants and newborns, interpretation of results should be based  on gestational age, weight and in agreement with clinical  observations.    Premature Infant recommended reference ranges:  Up to 24 hours.............<8.0 mg/dL  Up to 48 hours............<12.0 mg/dL  3-5 days..................<15.0 mg/dL  6-29 days.................<15.0 mg/dL            0.4  Comment: For infants and newborns, interpretation of results should be based  on gestational age, weight and in agreement with clinical  observations.    Premature Infant recommended reference ranges:  Up to 24 hours.............<8.0 mg/dL  Up to 48 hours............<12.0 mg/dL  3-5 days..................<15.0 mg/dL  6-29 days.................<15.0 mg/dL         BUN   10          10       Calcium   8.5          8.5       Chloride   110          110       CO2   26          26       Creatinine   1.0          1.0       Differential Method   Automated          Automated       eGFR   >60.0          >60.0       Eos #   0.1          0.1       Eos %   1.0          1.0       Glucose   126          126       Gran # (ANC)   8.5          8.5       Gran %   69.8          69.8       Group & Rh   A POS       Hematocrit   33.4          33.4       Hemoglobin   9.9          9.9       Immature Grans (Abs)   0.07  Comment: Mild elevation in immature granulocytes is non specific and   can be seen in a variety of conditions including stress response,   acute inflammation, trauma and pregnancy. Correlation with other   laboratory and clinical findings is essential.            0.07  Comment: Mild elevation in immature granulocytes is non specific and   can be seen in a variety of conditions including stress response,   acute inflammation, trauma and  pregnancy. Correlation with other   laboratory and clinical findings is essential.         Immature Granulocytes   0.6          0.6       INDIRECT FLACO   NEG       INR   1.0  Comment: Coumadin Therapy:  2.0 - 3.0 for INR for all indicators except mechanical heart valves  and antiphospholipid syndromes which should use 2.5 - 3.5.         Lipase   14       Lymph #   2.8          2.8       Lymph %   23.0          23.0       MCH   26.8          26.8       MCHC   29.6          29.6       MCV   91          91       Mono #   0.7          0.7       Mono %   5.4          5.4       MPV   9.7          9.7       nRBC   0          0       Occult Blood Positive         Platelet Count   345          345       Potassium   3.7          3.7       PROTEIN TOTAL   6.4          6.4       PT   11.6       RBC   3.69          3.69       RDW   19.8          19.8       Sodium   144          144       Specimen Outdate   02/18/2025 23:59       WBC   12.11          12.11               Significant Imaging: I have reviewed all pertinent imaging results/findings within the past 24 hours.  Imaging Results              CT Abdomen Pelvis With IV Contrast NO Oral Contrast (Final result)  Result time 02/15/25 15:14:40      Final result by Adrian Lynch MD (02/15/25 15:14:40)                   Impression:      1.  Mild long segment circumferential colonic wall thickening with fat stranding in the descending and rectosigmoid colon suggestive of colitis, without finding of bowel obstruction, intra-abdominal free air or abscess (likely infectious/inflammatory and less likely from vasculitis, ischemia or lymphoma).  Consider correlation with the symptoms, history and interval follow-up as occult malignancy is not excluded.    2.  Numerous bilateral, left greater than right nonobstructing renal stones.    3.  Hepatomegaly background parenchymal findings of steatosis.    4.  Additional, and incidental findings without adverse interval change from the  previous.      Electronically signed by: Adrian Lynch  Date:    02/15/2025  Time:    15:14               Narrative:      CMS MANDATED QUALITY DATA - CT RADIATION - 436    All CT scans at this facility utilize dose modulation, iterative reconstruction, and/or weight based dosing when appropriate to reduce radiation dose to as low as reasonably achievable.    CLINICAL HISTORY:  ([id number])49 y/o  (1975) M    Abdominal abscess/infection suspected;    TECHNIQUE:  (A#[id number], exam time 2/15/2025 14:59)    CT ABDOMEN PELVIS WITH IV CONTRAST [id number]    Axial CT images of the abdomen and pelvis were obtained from the dome of the diaphragm to the proximal thighs.    COMPARISON:  CT from 02/02/2025.    FINDINGS:  Lower Thorax:    The lung bases are clear. The heart is mildly enlarged in size.    CT Abdomen:    Liver: Relative diffuse low-attenuation liver consistent with hepatic steatosis.  The liver is enlarged measuring 20.5 cm sagittal right lobe.    Gallbladder: Surgically absent.    Biliary Tree: No intra or extrahepatic ductal dilation.    Spleen: Normal.    Pancreas: There is a large periampullary duodenal diverticulum.  No discrete pancreatic mass, ductal dilation or differential enhancement is seen to suggest malignancy.  No peripancreatic fat stranding or fluid is seen to suggest acute pancreatitis.    Adrenal Glands: Normal    Kidneys: No hydronephrosis/hydroureter or evidence of obstructive uropathy.  Numerous scattered, left greater than right, bilateral nonobstructing renal stones are present, with ten 3-5 mm stones on the left and three 1-3 mm stones on the right    Vasculature: Normal.    Lymph nodes: No abdominal lymphadenopathy is seen.    Intraperitoneal structures: There is no ascites.    Bowel: Mild long segment circumferential colonic wall thickening is seen in the descending and sigmoid colon with adjacent fat stranding (image 174).  No finding of obstruction, intra-abdominal free air or  abscess.    Abdominal wall: The abdominal wall and musculature are normal.    Musculoskeletal: No acute osseous abnormality is identified.    CT Pelvis:    Bladder: Normal.    Reproductive Organs: The prostate and seminal vesicles are within normal limits.    Pelvic Lymph nodes: No pelvic lymphadenopathy is identified.

## 2025-02-15 NOTE — ASSESSMENT & PLAN NOTE
Versus IC with bloody diarrhea,   Hx of fecal transplant, proctocolitis    Consult GI  IV hydration, pain control  Clear liquids  Continue w/ budesonide  Continue Tremfaya (not on formulary- may take home med   Continue Xeljanz 10 mg twice daily.   Continue Lyrica  Patient is scheduled for surgery March 6.    Continue IV Methylpednisolone  Start cipro and metronidazole

## 2025-02-16 LAB
ALBUMIN SERPL BCP-MCNC: 3.1 G/DL (ref 3.5–5.2)
ALP SERPL-CCNC: 58 U/L (ref 55–135)
ALT SERPL W/O P-5'-P-CCNC: 13 U/L (ref 10–44)
ANION GAP SERPL CALC-SCNC: 6 MMOL/L (ref 8–16)
AST SERPL-CCNC: 11 U/L (ref 10–40)
ASTROVIRUS: NOT DETECTED
BILIRUB SERPL-MCNC: 0.4 MG/DL (ref 0.1–1)
BUN SERPL-MCNC: 13 MG/DL (ref 6–20)
C COLI+JEJ+UPSA DNA STL QL NAA+NON-PROBE: NOT DETECTED
CALCIUM SERPL-MCNC: 8.3 MG/DL (ref 8.7–10.5)
CHLORIDE SERPL-SCNC: 105 MMOL/L (ref 95–110)
CO2 SERPL-SCNC: 27 MMOL/L (ref 23–29)
CREAT SERPL-MCNC: 1 MG/DL (ref 0.5–1.4)
CRP SERPL-MCNC: 0.1 MG/DL
CYCLOSPORA CAYETANENSIS: NOT DETECTED
ENTEROAGGREGATIVE E COLI: NOT DETECTED
ENTEROPATHOGENIC E COLI: NOT DETECTED
ERYTHROCYTE [DISTWIDTH] IN BLOOD BY AUTOMATED COUNT: 18.9 % (ref 11.5–14.5)
ERYTHROCYTE [SEDIMENTATION RATE] IN BLOOD BY WESTERGREN METHOD: 8 MM/HR (ref 0–10)
EST. GFR  (NO RACE VARIABLE): >60 ML/MIN/1.73 M^2
FERRITIN SERPL-MCNC: 9.7 NG/ML (ref 20–300)
GLUCOSE SERPL-MCNC: 144 MG/DL (ref 70–110)
GPP - ADENOVIRUS 40/41: NOT DETECTED
GPP - CRYPTOSPORIDIUM: NOT DETECTED
GPP - E COLI O157: NOT DETECTED
GPP - ENTAMOEBA HISTOLYTICA: NOT DETECTED
GPP - ENTEROTOXIGENIC E COLI (ETEC): NOT DETECTED
GPP - GIARDIA LAMBLIA: NOT DETECTED
GPP - NOROVIRUS GI/GII: DETECTED
GPP - ROTAVIRUS A: NOT DETECTED
GPP - SALMONELLA: NOT DETECTED
GPP - VIBRIO CHOLERA: NOT DETECTED
GPP - YERSINIA ENTEROCOLITICA: NOT DETECTED
HCT VFR BLD AUTO: 29.1 % (ref 40–54)
HGB BLD-MCNC: 8.9 G/DL (ref 14–18)
IRON SERPL-MCNC: 26 UG/DL (ref 45–160)
LACTATE PLASV-SCNC: NOT DETECTED MMOL/L
LACTATE SERPL-SCNC: 2 MMOL/L (ref 0.5–1.9)
LACTATE SERPL-SCNC: 2.2 MMOL/L (ref 0.5–1.9)
MAGNESIUM SERPL-MCNC: 1.5 MG/DL (ref 1.6–2.6)
MCH RBC QN AUTO: 27.1 PG (ref 27–31)
MCHC RBC AUTO-ENTMCNC: 30.6 G/DL (ref 32–36)
MCV RBC AUTO: 89 FL (ref 82–98)
OHS QRS DURATION: 78 MS
OHS QTC CALCULATION: 459 MS
PLATELET # BLD AUTO: 256 K/UL (ref 150–450)
PLESIOMONAS SHIGELLOIDES: NOT DETECTED
PMV BLD AUTO: 9.7 FL (ref 9.2–12.9)
POCT GLUCOSE: 136 MG/DL (ref 70–110)
POCT GLUCOSE: 154 MG/DL (ref 70–110)
POCT GLUCOSE: 156 MG/DL (ref 70–110)
POTASSIUM SERPL-SCNC: 4.9 MMOL/L (ref 3.5–5.1)
PROT SERPL-MCNC: 5.9 G/DL (ref 6–8.4)
RBC # BLD AUTO: 3.28 M/UL (ref 4.6–6.2)
SAPOVIRUS: NOT DETECTED
SATURATED IRON: 8 % (ref 20–50)
SHIGELLA SP+EIEC IPAH STL QL NAA+PROBE: NOT DETECTED
SODIUM SERPL-SCNC: 138 MMOL/L (ref 136–145)
TOTAL IRON BINDING CAPACITY: 339 UG/DL (ref 250–450)
TRANSFERRIN SERPL-MCNC: 242 MG/DL (ref 200–375)
VIBRIO: NOT DETECTED
WBC # BLD AUTO: 7.39 K/UL (ref 3.9–12.7)
WBC #/AREA STL HPF: ABNORMAL /[HPF]

## 2025-02-16 PROCEDURE — 87045 FECES CULTURE AEROBIC BACT: CPT | Performed by: EMERGENCY MEDICINE

## 2025-02-16 PROCEDURE — 36415 COLL VENOUS BLD VENIPUNCTURE: CPT | Performed by: INTERNAL MEDICINE

## 2025-02-16 PROCEDURE — 96366 THER/PROPH/DIAG IV INF ADDON: CPT

## 2025-02-16 PROCEDURE — 80053 COMPREHEN METABOLIC PANEL: CPT | Performed by: INTERNAL MEDICINE

## 2025-02-16 PROCEDURE — 96367 TX/PROPH/DG ADDL SEQ IV INF: CPT

## 2025-02-16 PROCEDURE — 63600175 PHARM REV CODE 636 W HCPCS: Mod: JZ,TB | Performed by: INTERNAL MEDICINE

## 2025-02-16 PROCEDURE — 82728 ASSAY OF FERRITIN: CPT | Performed by: NURSE PRACTITIONER

## 2025-02-16 PROCEDURE — 25000003 PHARM REV CODE 250: Performed by: INTERNAL MEDICINE

## 2025-02-16 PROCEDURE — 83993 ASSAY FOR CALPROTECTIN FECAL: CPT | Performed by: NURSE PRACTITIONER

## 2025-02-16 PROCEDURE — 87040 BLOOD CULTURE FOR BACTERIA: CPT | Performed by: INTERNAL MEDICINE

## 2025-02-16 PROCEDURE — 87046 STOOL CULTR AEROBIC BACT EA: CPT | Mod: 59 | Performed by: EMERGENCY MEDICINE

## 2025-02-16 PROCEDURE — 83605 ASSAY OF LACTIC ACID: CPT | Performed by: INTERNAL MEDICINE

## 2025-02-16 PROCEDURE — 86140 C-REACTIVE PROTEIN: CPT | Performed by: INTERNAL MEDICINE

## 2025-02-16 PROCEDURE — 87209 SMEAR COMPLEX STAIN: CPT | Performed by: EMERGENCY MEDICINE

## 2025-02-16 PROCEDURE — 36415 COLL VENOUS BLD VENIPUNCTURE: CPT | Performed by: NURSE PRACTITIONER

## 2025-02-16 PROCEDURE — 83605 ASSAY OF LACTIC ACID: CPT | Mod: 91 | Performed by: NURSE PRACTITIONER

## 2025-02-16 PROCEDURE — 63600175 PHARM REV CODE 636 W HCPCS: Performed by: NURSE PRACTITIONER

## 2025-02-16 PROCEDURE — 25000003 PHARM REV CODE 250: Performed by: NURSE PRACTITIONER

## 2025-02-16 PROCEDURE — 96361 HYDRATE IV INFUSION ADD-ON: CPT

## 2025-02-16 PROCEDURE — 12000002 HC ACUTE/MED SURGE SEMI-PRIVATE ROOM

## 2025-02-16 PROCEDURE — 25000003 PHARM REV CODE 250: Performed by: STUDENT IN AN ORGANIZED HEALTH CARE EDUCATION/TRAINING PROGRAM

## 2025-02-16 PROCEDURE — 83540 ASSAY OF IRON: CPT | Performed by: NURSE PRACTITIONER

## 2025-02-16 PROCEDURE — 87507 IADNA-DNA/RNA PROBE TQ 12-25: CPT | Performed by: NURSE PRACTITIONER

## 2025-02-16 PROCEDURE — 96376 TX/PRO/DX INJ SAME DRUG ADON: CPT

## 2025-02-16 PROCEDURE — 85651 RBC SED RATE NONAUTOMATED: CPT | Performed by: NURSE PRACTITIONER

## 2025-02-16 PROCEDURE — 82962 GLUCOSE BLOOD TEST: CPT

## 2025-02-16 PROCEDURE — 83735 ASSAY OF MAGNESIUM: CPT | Performed by: INTERNAL MEDICINE

## 2025-02-16 PROCEDURE — 87449 NOS EACH ORGANISM AG IA: CPT | Performed by: EMERGENCY MEDICINE

## 2025-02-16 PROCEDURE — 85027 COMPLETE CBC AUTOMATED: CPT | Performed by: INTERNAL MEDICINE

## 2025-02-16 PROCEDURE — 89055 LEUKOCYTE ASSESSMENT FECAL: CPT | Performed by: EMERGENCY MEDICINE

## 2025-02-16 RX ORDER — MAGNESIUM SULFATE 1 G/100ML
1 INJECTION INTRAVENOUS ONCE
Status: COMPLETED | OUTPATIENT
Start: 2025-02-16 | End: 2025-02-16

## 2025-02-16 RX ORDER — MORPHINE SULFATE 4 MG/ML
4 INJECTION, SOLUTION INTRAMUSCULAR; INTRAVENOUS EVERY 6 HOURS PRN
Status: COMPLETED | OUTPATIENT
Start: 2025-02-16 | End: 2025-02-17

## 2025-02-16 RX ADMIN — LACTOBACILLUS TAB 1 TABLET: TAB at 04:02

## 2025-02-16 RX ADMIN — HYDROCODONE BITARTRATE AND ACETAMINOPHEN 1 TABLET: 5; 325 TABLET ORAL at 08:02

## 2025-02-16 RX ADMIN — CIPROFLOXACIN 400 MG: 2 INJECTION, SOLUTION INTRAVENOUS at 04:02

## 2025-02-16 RX ADMIN — FAMOTIDINE 20 MG: 20 TABLET, FILM COATED ORAL at 10:02

## 2025-02-16 RX ADMIN — ONDANSETRON 4 MG: 2 INJECTION INTRAMUSCULAR; INTRAVENOUS at 01:02

## 2025-02-16 RX ADMIN — HYDROCODONE BITARTRATE AND ACETAMINOPHEN 1 TABLET: 5; 325 TABLET ORAL at 01:02

## 2025-02-16 RX ADMIN — FAMOTIDINE 20 MG: 20 TABLET, FILM COATED ORAL at 08:02

## 2025-02-16 RX ADMIN — METRONIDAZOLE 500 MG: 500 INJECTION, SOLUTION INTRAVENOUS at 02:02

## 2025-02-16 RX ADMIN — BUSPIRONE HYDROCHLORIDE 15 MG: 5 TABLET ORAL at 02:02

## 2025-02-16 RX ADMIN — MAGNESIUM SULFATE IN DEXTROSE 1 G: 10 INJECTION, SOLUTION INTRAVENOUS at 10:02

## 2025-02-16 RX ADMIN — MORPHINE SULFATE 4 MG: 4 INJECTION, SOLUTION INTRAMUSCULAR; INTRAVENOUS at 10:02

## 2025-02-16 RX ADMIN — ONDANSETRON 4 MG: 2 INJECTION INTRAMUSCULAR; INTRAVENOUS at 08:02

## 2025-02-16 RX ADMIN — LACTOBACILLUS TAB 1 TABLET: TAB at 06:02

## 2025-02-16 RX ADMIN — METHYLPREDNISOLONE SODIUM SUCCINATE 40 MG: 40 INJECTION, POWDER, FOR SOLUTION INTRAMUSCULAR; INTRAVENOUS at 01:02

## 2025-02-16 RX ADMIN — LACTOBACILLUS TAB 1 TABLET: TAB at 10:02

## 2025-02-16 RX ADMIN — PREGABALIN 50 MG: 25 CAPSULE ORAL at 08:02

## 2025-02-16 RX ADMIN — MORPHINE SULFATE 4 MG: 4 INJECTION, SOLUTION INTRAMUSCULAR; INTRAVENOUS at 05:02

## 2025-02-16 RX ADMIN — ESCITALOPRAM OXALATE 20 MG: 10 TABLET ORAL at 10:02

## 2025-02-16 RX ADMIN — BUSPIRONE HYDROCHLORIDE 15 MG: 5 TABLET ORAL at 10:02

## 2025-02-16 RX ADMIN — METOPROLOL SUCCINATE 50 MG: 50 TABLET, FILM COATED, EXTENDED RELEASE ORAL at 10:02

## 2025-02-16 RX ADMIN — DIPHENHYDRAMINE HYDROCHLORIDE 25 MG: 25 CAPSULE ORAL at 03:02

## 2025-02-16 RX ADMIN — MORPHINE SULFATE 4 MG: 4 INJECTION, SOLUTION INTRAMUSCULAR; INTRAVENOUS at 09:02

## 2025-02-16 RX ADMIN — ZOLPIDEM TARTRATE 10 MG: 5 TABLET, FILM COATED ORAL at 08:02

## 2025-02-16 RX ADMIN — BUSPIRONE HYDROCHLORIDE 15 MG: 5 TABLET ORAL at 08:02

## 2025-02-16 RX ADMIN — HYDROCODONE BITARTRATE AND ACETAMINOPHEN 1 TABLET: 5; 325 TABLET ORAL at 06:02

## 2025-02-16 RX ADMIN — SODIUM CHLORIDE: 9 INJECTION, SOLUTION INTRAVENOUS at 03:02

## 2025-02-16 RX ADMIN — PREGABALIN 50 MG: 25 CAPSULE ORAL at 10:02

## 2025-02-16 RX ADMIN — METRONIDAZOLE 500 MG: 500 INJECTION, SOLUTION INTRAVENOUS at 10:02

## 2025-02-16 RX ADMIN — DIAZEPAM 5 MG: 5 TABLET ORAL at 08:02

## 2025-02-16 RX ADMIN — METRONIDAZOLE 500 MG: 500 INJECTION, SOLUTION INTRAVENOUS at 06:02

## 2025-02-16 RX ADMIN — ATORVASTATIN CALCIUM 10 MG: 10 TABLET, FILM COATED ORAL at 10:02

## 2025-02-16 NOTE — ASSESSMENT & PLAN NOTE
Patient's FSGs are uncontrolled due to hyperglycemia on current medication regimen.  Last A1c reviewed-   Lab Results   Component Value Date    HGBA1C 5.8 11/02/2024     Most recent fingerstick glucose reviewed-   Recent Labs   Lab 02/15/25  1953 02/16/25  0708   POCTGLUCOSE 141* 154*     Current correctional scale  Low  Maintain anti-hyperglycemic dose as follows-   Antihyperglycemics (From admission, onward)    Start     Stop Route Frequency Ordered    02/15/25 1703  insulin aspart U-100 pen 0-5 Units         -- SubQ Before meals & nightly PRN 02/15/25 1603        Hold Oral hypoglycemics while patient is in the hospital.

## 2025-02-16 NOTE — PLAN OF CARE
Problem: Adult Inpatient Plan of Care  Goal: Plan of Care Review  Outcome: Progressing  Goal: Patient-Specific Goal (Individualized)  Outcome: Progressing  Goal: Absence of Hospital-Acquired Illness or Injury  Outcome: Progressing  Goal: Optimal Comfort and Wellbeing  Outcome: Progressing  Goal: Readiness for Transition of Care  Outcome: Progressing     Problem: Bariatric Environmental Safety  Goal: Safety Maintained with Care  Outcome: Progressing     Problem: Diabetes Comorbidity  Goal: Blood Glucose Level Within Targeted Range  Outcome: Progressing     Problem: Fall Injury Risk  Goal: Absence of Fall and Fall-Related Injury  Outcome: Progressing     Problem: Infection  Goal: Absence of Infection Signs and Symptoms  Outcome: Progressing

## 2025-02-16 NOTE — SUBJECTIVE & OBJECTIVE
Interval History:  Patient evaluated at bedside.  He did have some abdominal pain this morning however improved with morphine.  He reports a bowel movement this a.m. that was more formed still with some blood streaking.  Maintained on Cipro Flagyl IV steroids and IV fluids.  NPO awaiting GI evaluation.    Review of Systems   Constitutional:  Positive for chills. Negative for activity change and appetite change.   Respiratory:  Negative for shortness of breath.    Gastrointestinal:  Positive for abdominal pain, blood in stool and diarrhea. Negative for abdominal distention, nausea and vomiting.     Objective:     Vital Signs (Most Recent):  Temp: 98 °F (36.7 °C) (02/16/25 0706)  Pulse: 79 (02/16/25 0706)  Resp: 16 (02/16/25 0913)  BP: (!) 143/88 (02/16/25 0706)  SpO2: 98 % (02/16/25 0706) Vital Signs (24h Range):  Temp:  [97.8 °F (36.6 °C)-98.6 °F (37 °C)] 98 °F (36.7 °C)  Pulse:  [76-93] 79  Resp:  [16-20] 16  SpO2:  [95 %-100 %] 98 %  BP: (125-145)/(67-88) 143/88     Weight: 135.3 kg (298 lb 3.2 oz)  Body mass index is 48.13 kg/m².    Intake/Output Summary (Last 24 hours) at 2/16/2025 1011  Last data filed at 2/16/2025 0842  Gross per 24 hour   Intake 4527 ml   Output --   Net 4527 ml         Physical Exam  Constitutional:       Appearance: Normal appearance. He is obese.   HENT:      Head: Normocephalic.      Mouth/Throat:      Mouth: Mucous membranes are moist.      Pharynx: Oropharynx is clear.   Eyes:      Extraocular Movements: Extraocular movements intact.   Cardiovascular:      Rate and Rhythm: Normal rate and regular rhythm.      Pulses: Normal pulses.      Heart sounds: Normal heart sounds.   Pulmonary:      Effort: Pulmonary effort is normal.      Breath sounds: Normal breath sounds.   Abdominal:      General: Bowel sounds are normal. There is no distension.      Palpations: Abdomen is soft.      Tenderness: There is abdominal tenderness. There is no guarding.   Skin:     General: Skin is warm.       Capillary Refill: Capillary refill takes less than 2 seconds.   Neurological:      Mental Status: He is alert and oriented to person, place, and time.   Psychiatric:         Mood and Affect: Mood normal.             Significant Labs: All pertinent labs within the past 24 hours have been reviewed.  Recent Lab Results  (Last 5 results in the past 24 hours)        02/16/25  0916   02/16/25  0708   02/16/25  0700   02/16/25  0630   02/15/25  1953        Albumin     3.1           ALP     58           ALT     13           Anion Gap     6           AST     11           BILIRUBIN TOTAL     0.4  Comment: For infants and newborns, interpretation of results should be based  on gestational age, weight and in agreement with clinical  observations.    Premature Infant recommended reference ranges:  Up to 24 hours.............<8.0 mg/dL  Up to 48 hours............<12.0 mg/dL  3-5 days..................<15.0 mg/dL  6-29 days.................<15.0 mg/dL             BUN     13           Calcium     8.3           Chloride     105           CO2     27           Creatinine     1.0           CRP     0.10  Comment: CRP-Normal Application expected values:   <1.0        mg/dL   Normal Range  1.0 - 5.0  mg/dL   Indicates mild inflammation  5.0 - 10.0 mg/dL   Indicates severe inflammation  >10.0        mg/dL   Represents serious processes and   frequently         indicates the presence of a bacterial   infection.              eGFR     >60.0           Glucose     144           Hematocrit 29.1               Hemoglobin 8.9               Magnesium      1.5           MCH 27.1               MCHC 30.6               MCV 89               MPV 9.7               Platelet Count 256               POCT Glucose   154       141       Potassium     4.9           PROTEIN TOTAL     5.9           RBC 3.28               RDW 18.9               Sed Rate 8               Sodium     138           Stool WBC       Occ neutrophils seen         WBC 7.39                                       Significant Imaging: I have reviewed all pertinent imaging results/findings within the past 24 hours.

## 2025-02-16 NOTE — PLAN OF CARE
[facility]  Initial Discharge Assessment       Primary Care Provider: Hunter Aguilar III, MD    Admission Diagnosis: Colitis [K52.9]    Admission Date: 2/15/2025  Expected Discharge Date:      met with Pt at bedside to complete discharge assessment. Pt AAOx4s. Demographics, PCP, and insurance verified. No home health. No dialysis. Pt reports ability to complete ADLs without assistance. Pt verbalized plan to discharge home via self transport. Pt has no other needs to be addressed at this time.     Transition of Care Barriers: (P) None    Payor: [employer] / Plan: [employer] (UMR) / Product Type: Commercial /     Extended Emergency Contact Information  Primary Emergency Contact: Estiven (Sister-In-Law)Jessica  Address: [address] [place] [address], [place] [address] [facility]  Home Phone: [phone]  Mobile Phone: [phone]  Relation: Sister  Preferred language: English   needed? No  Secondary Emergency Contact: Dallas Jean-Baptiste   [place]  Mobile Phone: [phone]  Relation: Brother  Preferred language: English   needed? No    Discharge Plan A: (P) Home  Discharge Plan B: (P) Home      [facility]  [address]  [place] [facility] [place] [address]  Phone: [phone] Fax: [phone]    [id number] - [place], [place] - [address] [facility] [address]  [address]  [facility] [address]  Phone: [phone] Fax: [phone]      Initial Assessment (most recent)       Adult Discharge Assessment - 02/16/25 0949          Discharge Assessment    Assessment Type Discharge Planning Assessment (P)      Confirmed/corrected address, phone number and insurance Yes (P)      Confirmed Demographics Correct on Facesheet (P)      Source of Information patient (P)      When was your last doctors appointment? 02/12/25 (P)      Communicated GERARDO with patient/caregiver Date not  available/Unable to determine (P)      Reason For Admission Lower GI bleed secondary to colitis (P)      People in Home alone (P)      Facility Arrived From: Home (P)      Do you expect to return to your current living situation? Yes (P)      Do you have help at home or someone to help you manage your care at home? No (P)      Prior to hospitilization cognitive status: Alert/Oriented (P)      Current cognitive status: Alert/Oriented (P)      Walking or Climbing Stairs Difficulty no (P)      Dressing/Bathing Difficulty no (P)      Home Accessibility not wheelchair accessible (P)      Home Layout Able to live on 1st floor (P)      Equipment Currently Used at Home none (P)      Readmission within 30 days? Yes (P)      Patient currently being followed by outpatient case management? Yes (P)      If yes, name of outpatient case management following: insurance company assigned oupatient case management (P)      Do you currently have service(s) that help you manage your care at home? No (P)      Do you take prescription medications? Yes (P)      Do you have prescription coverage? Yes (P)      Coverage Payor: [employer] - [place] [employer] (R) - (P)      Do you have any problems affording any of your prescribed medications? No (P)      Is the patient taking medications as prescribed? yes (P)      Who is going to help you get home at discharge? Self-Transportation (P)      How do you get to doctors appointments? car, drives self (P)      Are you on dialysis? No (P)      Do you take coumadin? -- (P)    Xeralto    Discharge Plan A Home (P)      Discharge Plan B Home (P)      DME Needed Upon Discharge  none (P)      Discharge Plan discussed with: Patient (P)      Transition of Care Barriers None (P)

## 2025-02-16 NOTE — CONSULTS
GASTROENTEROLOGY INPATIENT CONSULT NOTE  Patient Name: Jacoby Jean-Baptiste  Patient MRN: [id number]  Patient : 1975    Admit Date: 2/15/2025  Service date: 2025    Reason for Consult: colitis    PCP: Hunter Aguilar III, MD    Chief Complaint   Patient presents with    Rectal Bleeding     X 1 DAY    Abdominal Pain       HPI: Patient is a 50 y.o. male with PMHx  severe refractory UC well known to our service currently on tremfya and started xeljanz on Wed after meeting Dr Irma Beal GI at [facility].  Has total colectomy planned early march. Presented due to recurrence of loose explosive bowel movements associated with some rectal bleeding.     Past Medical History:  Past Medical History:   Diagnosis Date    C. difficile colitis     Cavitary pneumonia 2023    pos aspergillus serology    Diabetes mellitus 2022    Hyperlipidemia     Hypertension     Insomnia     Ulcerative colitis         Past Surgical History:  Past Surgical History:   Procedure Laterality Date    BRONCHOSCOPY WITH FLUOROSCOPY Left 2023    Procedure: BRONCHOSCOPY, WITH FLUOROSCOPY;  Surgeon: Lisa Villarreal MD;  Location: [facility];  Service: Pulmonary;  Laterality: Left;    BRONCHOSCOPY WITH FLUOROSCOPY N/A 2023    Procedure: BRONCHOSCOPY, WITH FLUOROSCOPY;  Surgeon: Lisa Villarreal MD;  Location: [facility];  Service: Pulmonary;  Laterality: N/A;    CARDIAC ELECTROPHYSIOLOGY MAPPING AND ABLATION      SVT    CHOLECYSTECTOMY      COLONOSCOPY N/A 5/3/2022    Procedure: COLONOSCOPY;  Surgeon: Shan Jean III, MD;  Location: [facility];  Service: Endoscopy;  Laterality: N/A;    COLONOSCOPY N/A 3/16/2024    Procedure: COLONOSCOPY;  Surgeon: ALEKSANDER Ramos MD;  Location: [facility];  Service: Endoscopy;  Laterality: N/A;    COLONOSCOPY N/A 2025    Procedure: COLONOSCOPY;  Surgeon: Russ Rowe MD;  Location: [facility];  Service: Endoscopy;  Laterality: N/A;    COLONOSCOPY WITH FECAL  MICROBIOTA TRANSFER N/A 3/21/2023    Procedure: COLONOSCOPY, WITH FECAL MICROBIOTA TRANSFER;  Surgeon: David Millan MD;  Location: [facility];  Service: Endoscopy;  Laterality: N/A;    ESOPHAGOGASTRODUODENOSCOPY N/A 4/24/2023    Procedure: EGD (ESOPHAGOGASTRODUODENOSCOPY);  Surgeon: Shan Jean III, MD;  Location: [facility];  Service: Endoscopy;  Laterality: N/A;    ESOPHAGOGASTRODUODENOSCOPY N/A 6/5/2024    Procedure: EGD (ESOPHAGOGASTRODUODENOSCOPY);  Surgeon: Odalis Mccabe MD;  Location: [facility];  Service: Endoscopy;  Laterality: N/A;    FLEXIBLE SIGMOIDOSCOPY N/A 6/5/2024    Procedure: SIGMOIDOSCOPY, FLEXIBLE;  Surgeon: Odalis Mccabe MD;  Location: [facility];  Service: Endoscopy;  Laterality: N/A;    FLEXIBLE SIGMOIDOSCOPY N/A 7/16/2024    Procedure: SIGMOIDOSCOPY, FLEXIBLE;  Surgeon: Shan Jean III, MD;  Location: [facility];  Service: Endoscopy;  Laterality: N/A;    FLEXIBLE SIGMOIDOSCOPY N/A 8/13/2024    Procedure: SIGMOIDOSCOPY, FLEXIBLE;  Surgeon: Shan Jean III, MD;  Location: [facility];  Service: Endoscopy;  Laterality: N/A;    GANGLION CYST EXCISION Left 1992    UMBILICAL HERNIA REPAIR  2011    with mesh        Home Medications:  Prescriptions Prior to Admission[1]    Inpatient Medications:   atorvastatin  10 mg Oral Daily    busPIRone  15 mg Oral TID    ciprofloxacin  400 mg Intravenous Q12H    EScitalopram oxalate  20 mg Oral Daily    famotidine  20 mg Oral BID    Lactobacillus acidoph-L.bulgar  1 tablet Oral TID WM    methylPREDNISolone injection (PEDS and ADULTS)  40 mg Intravenous Q12H    metoprolol succinate  50 mg Oral Daily    metroNIDAZOLE IV (PEDS and ADULTS)  500 mg Intravenous Q8H    pregabalin  50 mg Oral BID    tofacitinib  10 mg Oral BID       Current Facility-Administered Medications:     acetaminophen, 650 mg, Oral, Q4H PRN    dextrose 50%, 12.5 g, Intravenous, PRN    dextrose 50%, 25 g, Intravenous, PRN    diazePAM, 5 mg, Oral, Daily PRN     diphenhydrAMINE, 25 mg, Oral, Q12H PRN    glucagon (human recombinant), 1 mg, Intramuscular, PRN    glucose, 16 g, Oral, PRN    glucose, 24 g, Oral, PRN    HYDROcodone-acetaminophen, 1 tablet, Oral, Q6H PRN    insulin aspart U-100, 0-5 Units, Subcutaneous, QID (AC + HS) PRN    magnesium oxide, 800 mg, Oral, PRN    magnesium oxide, 800 mg, Oral, PRN    morphine, 4 mg, Intravenous, Q6H PRN    naloxone, 0.02 mg, Intravenous, PRN    ondansetron, 4 mg, Intravenous, Q6H PRN    potassium bicarbonate, 35 mEq, Oral, PRN    potassium bicarbonate, 50 mEq, Oral, PRN    potassium bicarbonate, 60 mEq, Oral, PRN    potassium, sodium phosphates, 2 packet, Oral, PRN    potassium, sodium phosphates, 2 packet, Oral, PRN    potassium, sodium phosphates, 2 packet, Oral, PRN    senna-docusate 8.6-50 mg, 1 tablet, Oral, BID PRN    sodium chloride 0.9%, 3 mL, Intravenous, Q12H PRN    zolpidem, 10 mg, Oral, Nightly PRN    Review of patient's allergies indicates:  No Known Allergies    Social History:   Social History     Occupational History    Not on file   Tobacco Use    Smoking status: Former     Average packs/day: 1 pack/day for 30.0 years (30.0 ttl pk-yrs)     Types: Cigarettes     Start date: 1993    Smokeless tobacco: Never    Tobacco comments:     Pt states he has stopped smoking with Chantix three weeks ago. 12/1/23   Substance and Sexual Activity    Alcohol use: Yes     Comment: ocass    Drug use: Not Currently    Sexual activity: Not Currently       Family History:   Family History   Problem Relation Name Age of Onset    Asthma Mother         Review of Systems:  A 10 point review of systems was performed and was normal, except as mentioned in the HPI, including constitutional, HEENT, heme, lymph, cardiovascular, respiratory, gastrointestinal, genitourinary, neurologic, endocrine, psychiatric and musculoskeletal.      OBJECTIVE:    Physical Exam:  24 Hour Vital Sign Ranges: Temp:  [97.8 °F (36.6 °C)-98.6 °F (37 °C)] 98.2 °F (36.8  "°C)  Pulse:  [71-83] 73  Resp:  [15-18] 16  SpO2:  [95 %-99 %] 99 %  BP: (134-155)/(68-89) 138/76  Most recent vitals: /76 (BP Location: Right arm, Patient Position: Lying)   Pulse 73   Temp 98.2 °F (36.8 °C) (Oral)   Resp 16   Ht 5' 6" (1.676 m)   Wt 135.3 kg (298 lb 3.2 oz)   SpO2 99%   BMI 48.13 kg/m²    GEN: well-developed, well-nourished, awake and alert, non-toxic appearing adult  HEENT: PERRL, sclera anicteric, oral mucosa pink and moist without lesion  NECK: trachea midline; Good ROM  CV: regular rate and rhythm, no murmurs or gallops  RESP: clear to auscultation bilaterally, no wheezes, rhonci or rales  ABD: soft, non-tender, non-distended, normal bowel sounds  EXT: no swelling or edema, 2+ pulses distally  SKIN: no rashes or jaundice  PSYCH: normal affect    Labs:   Recent Labs     02/15/25  1353 02/16/25  0916   WBC 12.11  12.11 7.39   MCV 91  91 89     345 256     Recent Labs     02/15/25  1353 02/16/25  0700     144 138   K 3.7  3.7 4.9     110 105   CO2 26  26 27   BUN 10  10 13   *  126* 144*     No results for input(s): "ALB" in the last 72 hours.    Invalid input(s): "ALKP", "SGOT", "SGPT", "TBIL", "DBIL", "TPRO"  Recent Labs     02/15/25  1353   INR 1.0         Radiology Review:  CT Abdomen Pelvis With IV Contrast NO Oral Contrast   Final Result      1.  Mild long segment circumferential colonic wall thickening with fat stranding in the descending and rectosigmoid colon suggestive of colitis, without finding of bowel obstruction, intra-abdominal free air or abscess (likely infectious/inflammatory and less likely from vasculitis, ischemia or lymphoma).  Consider correlation with the symptoms, history and interval follow-up as occult malignancy is not excluded.      2.  Numerous bilateral, left greater than right nonobstructing renal stones.      3.  Hepatomegaly background parenchymal findings of steatosis.      4.  Additional, and incidental findings " without adverse interval change from the previous.         Electronically signed by: Adrian Lynch   Date:    02/15/2025   Time:    15:14            IMPRESSION / RECOMMENDATIONS:  Rectal bleeding, diarrhea  H/o UC, refractory    -Cdiff testing ordered  -Has appt with Dr Beal next Wed  -continue xelrondaz and tremfya per Dr Beal recs  -will follow along    Thank you for this consult.    Enoc Kebede  2/16/2025  5:13 PM             [1]   Medications Prior to Admission   Medication Sig Dispense Refill Last Dose/Taking    budesonide (ENTOCORT EC) 3 mg capsule Take 3 capsules (9 mg total) by mouth once daily. 180 each 0 2/15/2025 Morning    busPIRone (BUSPAR) 15 MG tablet Take 1 tablet (15 mg total) by mouth 3 (three) times daily. 90 tablet 5 2/15/2025 Morning    diazePAM (VALIUM) 5 MG tablet Take 1 tablet (5 mg total) by mouth daily as needed for Anxiety. 30 tablet 0 2/14/2025    diphenoxylate-atropine 2.5-0.025 mg (LOMOTIL) 2.5-0.025 mg per tablet Take 1 tablet by mouth 3 (three) times daily as needed for Diarrhea.   2/15/2025 Morning    EScitalopram oxalate (LEXAPRO) 20 MG tablet Take 1 tablet (20 mg total) by mouth once daily. 90 tablet 0 2/15/2025 Morning    Lactobac 2-Bifido 1-S. therm (VSL#3) 112.5 billion cell Cap Take 1 capsule by mouth twice a day 60 capsule 0 2/15/2025 Morning    metoprolol succinate (TOPROL-XL) 50 MG 24 hr tablet Take 1 tablet (50 mg total) by mouth once daily. 90 tablet 1 2/15/2025 Morning    pantoprazole (PROTONIX) 40 MG tablet Take 40 mg by mouth 2 (two) times daily.   2/15/2025 Morning    pregabalin (LYRICA) 50 MG capsule Take 1 capsule by mouth 2 (two) times daily.   2/15/2025 Morning    promethazine (PHENERGAN) 25 MG tablet Take 25 mg by mouth 4 (four) times daily as needed for Nausea.   2/15/2025 Morning    rivaroxaban (XARELTO) 20 mg Tab Take 1 tablet (20 mg total) by mouth daily with dinner or evening meal. 90 tablet 0 2/14/2025 Bedtime    simvastatin (ZOCOR) 20 MG tablet Take 1  tablet (20 mg total) by mouth every evening. 90 tablet 1 2/15/2025 Morning    tofacitinib (XELJANZ) 10 mg Tab Take 10 mg by mouth 2 (two) times daily.   2/15/2025 Morning    zolpidem (AMBIEN) 10 mg Tab Take 1 tablet (10 mg total) by mouth nightly as needed (insomnia). 30 tablet 2 2/14/2025 Bedtime

## 2025-02-16 NOTE — HOSPITAL COURSE
50-year-old male with a past medical history of ulcerative colitis with several hospitalizations for exacerbations, C difficile who presented to the emergency department for the evaluation rectal bleeding and abdominal pain.  Patient was evaluated in the emergency department.  Leukocytosis noted, CT of abdomen and pelvis with mild long segment circumferential colonic wall thickening with fat stranding in the descending and rectosigmoid colon consistent with colitis without findings of bowel obstruction intra-abdominal air for abscesses.  Inflammatory markers within normal limits.  Patient was started on Cipro Flagyl and methylprednisolone IV.  GI was consulted.  GI PCR revealed norovirus, fecal calprotectin were ordered and pending.  Patient was monitored closely.  His pain was treated.  Patient continued to improve.  No further bloody diarrhea.  He did test positive for norovirus.  C diff pending.  Lactic acid improving, patient is not hypotensive or acidotic.  Inflammatory markers CRP and sed rate within normal limits.  He was found to be iron deficient anemic.  He was given Ferrlecit 125 mg x 1 dose.  Patient tolerating diet without nausea or vomiting.  Pain is controlled with oral pain medication.  We will discharge patient home with follow up to his gastroenterologist Dr. Beal in 1-2 weeks as well as follow up with his colorectal surgeon for plan colectomy in March.  He is instructed to return to the emergency department for any concerning symptoms.

## 2025-02-16 NOTE — ASSESSMENT & PLAN NOTE
Versus IC with bloody diarrhea,   Hx of fecal transplant, proctocolitis    Consult GI  IV hydration, pain control  Clear liquids  Continue w/ budesonide  Continue Tremfaya (not on formulary- may take home med   Continue Xeljanz 10 mg twice daily.   Continue Lyrica  Patient is scheduled for surgery March 6 2025 with Dr Church at [facility]  Continue IV Methylpednisolone  Start cipro and metronidazole

## 2025-02-16 NOTE — PROGRESS NOTES
[facility] Medicine  Progress Note    Patient Name: Jacoby Jean-Baptiste  MRN: [id number]  Patient Class: OP- Observation   Admission Date: 2/15/2025  Length of Stay: 0 days  Attending Physician: José Duvall MD  Primary Care Provider: Hunter Aguilar III, MD        Subjective     Principal Problem:GI bleed        HPI:  50 year old male with a past medical history of C. Diff colitis, cavitary pneumonia, diabetes, hypertension, hyperlipidemia, Ulcerative Colitis who present to the emergency room with diffuse abdominal pain, bloody diarrhea, nausea. Reports having 12 bowel movements with blood since last night. Denies fever and chills, dysuria, hematuria, focal weakness, malaise.     In the ER, no leukocytosis, anemia H/H 9.9/33.4, glucose 126, Ca 8.5, Albumin 3.4, Occult blood +, CT abdomen hepatomegaly, nonobstructing renal stones, colitis.   IV cipro and LR given in ER, IV methylprednisolone 125 mg IV given.     Admit to hospital medicine with Colitis, GI bleed.     Of Note:   Per Dr. Jean:   CHART REVIEW:   CT ABd 2/'25 - persistent colitis; s/p karina; otherwise nml abd  Colon 1/'25 - L sided colitis  Calpro 6750 1/'25  Tremfaya started 12/'24 due to lack fo response to Omvoh  Labs 11/'24 - Neg giardia / c. diff; calpro 1050  Calprotectin 9/'24 - increaing to 2310 despite Omvoh (Was decreasing initially)  Stool PCR - Negative.   Flex 8/'24 - chronic (improved) inflammation; CMV neg  EGD/colon 6/'24 - small HH; ampullary tic; mild ileitis (TI nml previously.?backwash?), L sided colitis  -reflux esophagitis. focal intestinal metaplasia antrum. Mild ileitis; sigmoid/proctitis; findings c/w UC; CMV negative in L sided biopsies  Labs 6/'24 -  Calprotectin 2480 on Omvoh at around 2 months. (Was >8K on entyvio but had near normalized on remicade)  Hospitalization 5/'24 - colitis on CT scan; Neg C. diff   Hospitalization 3/'24 - Calpro >8K; flex w/ severe L sided UC; placed on steroids. C. diff  neg;....d/c entyvio as lack of response  Hospitalization 2/'24; CT 2/'24 - fatty liver; L sided colitis; Calpro back up ot 3070 (was 201 on remicade); Neg C. diff  Entyvio started 1/'24  Hospitalization 11/'23 - cavitary fungal lesions...Remicade stopped  Labs 10/'23 - C.diff negative after vanco and rebyota enemas.  Labs 7/'23 -  Calprotectin 201 on remicade w/ C. diff...tx w/ vanco w/ long taper +/- rebyota; Nml CRP much better on remicade  EGD 4/'23 - candidate esophagitis / duodenitis; HP neg gastirits  Hospitalization 4/'23 - colitis on CT; C. diff neg; Remicade loaded  Colon 3/'23 - Pan-colitis; Nml TI; s/p FMT for refractory C. diff  C. DIff + 3/'23  Labs 1/'23 - CRP 21; Stool PCR + C. DIff....  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     Overview/Hospital Course:  50-year-old male with a past medical history of ulcerative colitis with several hospitalizations for exacerbations, C difficile who presented to the emergency department for the evaluation rectal bleeding and abdominal pain.  Patient was evaluated in the emergency department.  Leukocytosis noted, CT of abdomen and pelvis with mild long segment circumferential colonic wall thickening with fat stranding in the descending and rectosigmoid colon consistent with colitis without findings of bowel obstruction intra-abdominal air for abscesses.  Inflammatory markers within normal limits.  Patient was started on Cipro Flagyl and methylprednisolone IV.  GI was consulted.  GI PCR, fecal calprotectin were ordered.  Patient was monitored closely.  His pain was treated.    Interval History:  Patient evaluated at bedside.  He did have some abdominal pain this morning however improved with morphine.  He reports a bowel movement this a.m. that was more formed still with some blood streaking.  Maintained on Cipro Flagyl IV steroids and IV fluids.  NPO awaiting GI evaluation.    Review of  Systems   Constitutional:  Positive for chills. Negative for activity change and appetite change.   Respiratory:  Negative for shortness of breath.    Gastrointestinal:  Positive for abdominal pain, blood in stool and diarrhea. Negative for abdominal distention, nausea and vomiting.     Objective:     Vital Signs (Most Recent):  Temp: 98 °F (36.7 °C) (02/16/25 0706)  Pulse: 79 (02/16/25 0706)  Resp: 16 (02/16/25 0913)  BP: (!) 143/88 (02/16/25 0706)  SpO2: 98 % (02/16/25 0706) Vital Signs (24h Range):  Temp:  [97.8 °F (36.6 °C)-98.6 °F (37 °C)] 98 °F (36.7 °C)  Pulse:  [76-93] 79  Resp:  [16-20] 16  SpO2:  [95 %-100 %] 98 %  BP: (125-145)/(67-88) 143/88     Weight: 135.3 kg (298 lb 3.2 oz)  Body mass index is 48.13 kg/m².    Intake/Output Summary (Last 24 hours) at 2/16/2025 1011  Last data filed at 2/16/2025 0842  Gross per 24 hour   Intake 4527 ml   Output --   Net 4527 ml         Physical Exam  Constitutional:       Appearance: Normal appearance. He is obese.   HENT:      Head: Normocephalic.      Mouth/Throat:      Mouth: Mucous membranes are moist.      Pharynx: Oropharynx is clear.   Eyes:      Extraocular Movements: Extraocular movements intact.   Cardiovascular:      Rate and Rhythm: Normal rate and regular rhythm.      Pulses: Normal pulses.      Heart sounds: Normal heart sounds.   Pulmonary:      Effort: Pulmonary effort is normal.      Breath sounds: Normal breath sounds.   Abdominal:      General: Bowel sounds are normal. There is no distension.      Palpations: Abdomen is soft.      Tenderness: There is abdominal tenderness. There is no guarding.   Skin:     General: Skin is warm.      Capillary Refill: Capillary refill takes less than 2 seconds.   Neurological:      Mental Status: He is alert and oriented to person, place, and time.   Psychiatric:         Mood and Affect: Mood normal.             Significant Labs: All pertinent labs within the past 24 hours have been reviewed.  Recent Lab Results   (Last 5 results in the past 24 hours)        02/16/25  0916   02/16/25  0708   02/16/25  0700   02/16/25  0630   02/15/25  1953        Albumin     3.1           ALP     58           ALT     13           Anion Gap     6           AST     11           BILIRUBIN TOTAL     0.4  Comment: For infants and newborns, interpretation of results should be based  on gestational age, weight and in agreement with clinical  observations.    Premature Infant recommended reference ranges:  Up to 24 hours.............<8.0 mg/dL  Up to 48 hours............<12.0 mg/dL  3-5 days..................<15.0 mg/dL  6-29 days.................<15.0 mg/dL             BUN     13           Calcium     8.3           Chloride     105           CO2     27           Creatinine     1.0           CRP     0.10  Comment: CRP-Normal Application expected values:   <1.0        mg/dL   Normal Range  1.0 - 5.0  mg/dL   Indicates mild inflammation  5.0 - 10.0 mg/dL   Indicates severe inflammation  >10.0        mg/dL   Represents serious processes and   frequently         indicates the presence of a bacterial   infection.              eGFR     >60.0           Glucose     144           Hematocrit 29.1               Hemoglobin 8.9               Magnesium      1.5           MCH 27.1               MCHC 30.6               MCV 89               MPV 9.7               Platelet Count 256               POCT Glucose   154       141       Potassium     4.9           PROTEIN TOTAL     5.9           RBC 3.28               RDW 18.9               Sed Rate 8               Sodium     138           Stool WBC       Occ neutrophils seen         WBC 7.39                                      Significant Imaging: I have reviewed all pertinent imaging results/findings within the past 24 hours.    Assessment and Plan     * Lower GI bleed secondary to colitis  Anemia due to blood loss secondary to GI bleed.     Patient's hemorrhage is due to gastrointestinal bleed, this bleeding is  associated with an anticoagulant, the anticoagulant is Select Anticoagulant(s): Direct oral anticoagulant: Rivaroxaban (Xarelto). Patients most recent Hgb, Hct, platelets, and INR are listed below.  Recent Labs     02/15/25  1353 02/16/25  0916   HGB 9.9*  9.9* 8.9*   HCT 33.4*  33.4* 29.1*     345 256   INR 1.0  --        Plan  - Will trend hemoglobin/hematocrit Daily  - Will monitor and correct any coagulation defects  - Will transfuse if Hgb is <7g/dl (<8g/dl in cases of active ACS) or if patient has rapid bleeding leading to hemodynamic instability  - hold xarelto  - Check iron studies       Essential hypertension  Patient's blood pressure range in the last 24 hours was: BP  Min: 125/70  Max: 145/88.The patient's inpatient anti-hypertensive regimen is listed below:  Current Antihypertensives  metoprolol succinate (TOPROL-XL) 24 hr tablet 50 mg, Daily, Oral    Plan  - BP is controlled, no changes needed to their regimen      Ulcerative colitis  Versus IC with bloody diarrhea,   Hx of fecal transplant, proctocolitis    Consult GI  IV hydration, pain control  Clear liquids  Continue w/ budesonide  Continue Tremfaya (not on formulary- may take home med   Continue Xeljanz 10 mg twice daily.   Continue Lyrica  Patient is scheduled for surgery March 6 2025 with Dr Church at [facility]  Continue IV Methylpednisolone  Start cipro and metronidazole            Type 2 diabetes mellitus without complication, without long-term current use of insulin  Patient's FSGs are uncontrolled due to hyperglycemia on current medication regimen.  Last A1c reviewed-   Lab Results   Component Value Date    HGBA1C 5.8 11/02/2024     Most recent fingerstick glucose reviewed-   Recent Labs   Lab 02/15/25  1953 02/16/25  0708   POCTGLUCOSE 141* 154*     Current correctional scale  Low  Maintain anti-hyperglycemic dose as follows-   Antihyperglycemics (From admission, onward)      Start     Stop Route Frequency Ordered    02/15/25 3113   insulin aspart U-100 pen 0-5 Units         -- SubQ Before meals & nightly PRN 02/15/25 1603          Hold Oral hypoglycemics while patient is in the hospital.          VTE Risk Mitigation (From admission, onward)           Ordered     Reason for No Pharmacological VTE Prophylaxis  Once        Question:  Reasons:  Answer:  Active Bleeding    02/15/25 1603     IP VTE HIGH RISK PATIENT  Once         02/15/25 1603     Place sequential compression device  Until discontinued         02/15/25 1603                    Discharge Planning   GERARDO:      Code Status: Full Code   Medical Readiness for Discharge Date:   Discharge Plan A: Home                        JAMIA Jordan  Department of Hospital Medicine   [facility]

## 2025-02-16 NOTE — ASSESSMENT & PLAN NOTE
Patient's blood pressure range in the last 24 hours was: BP  Min: 125/70  Max: 145/88.The patient's inpatient anti-hypertensive regimen is listed below:  Current Antihypertensives  metoprolol succinate (TOPROL-XL) 24 hr tablet 50 mg, Daily, Oral    Plan  - BP is controlled, no changes needed to their regimen

## 2025-02-16 NOTE — NURSING
Stool sample sent early this am. Lab called and stated that they were discontinuing the Cdiff test due to the stool sample being formed.

## 2025-02-17 VITALS
TEMPERATURE: 98 F | DIASTOLIC BLOOD PRESSURE: 75 MMHG | OXYGEN SATURATION: 98 % | HEART RATE: 61 BPM | HEIGHT: 66 IN | BODY MASS INDEX: 47.92 KG/M2 | SYSTOLIC BLOOD PRESSURE: 121 MMHG | WEIGHT: 298.19 LBS | RESPIRATION RATE: 15 BRPM

## 2025-02-17 LAB
25(OH)D3+25(OH)D2 SERPL-MCNC: 12 NG/ML (ref 30–96)
ALBUMIN SERPL BCP-MCNC: 3.3 G/DL (ref 3.5–5.2)
ALP SERPL-CCNC: 54 U/L (ref 55–135)
ALT SERPL W/O P-5'-P-CCNC: 13 U/L (ref 10–44)
ANION GAP SERPL CALC-SCNC: 5 MMOL/L (ref 8–16)
AST SERPL-CCNC: 11 U/L (ref 10–40)
BILIRUB SERPL-MCNC: 0.7 MG/DL (ref 0.1–1)
BUN SERPL-MCNC: 12 MG/DL (ref 6–20)
CALCIUM SERPL-MCNC: 8.4 MG/DL (ref 8.7–10.5)
CHLORIDE SERPL-SCNC: 101 MMOL/L (ref 95–110)
CO2 SERPL-SCNC: 29 MMOL/L (ref 23–29)
CREAT SERPL-MCNC: 0.9 MG/DL (ref 0.5–1.4)
ERYTHROCYTE [DISTWIDTH] IN BLOOD BY AUTOMATED COUNT: 19.7 % (ref 11.5–14.5)
EST. GFR  (NO RACE VARIABLE): >60 ML/MIN/1.73 M^2
GLUCOSE SERPL-MCNC: 148 MG/DL (ref 70–110)
HCT VFR BLD AUTO: 29.5 % (ref 40–54)
HGB BLD-MCNC: 9 G/DL (ref 14–18)
LACTATE SERPL-SCNC: 2.2 MMOL/L (ref 0.5–1.9)
MAGNESIUM SERPL-MCNC: 1.7 MG/DL (ref 1.6–2.6)
MCH RBC QN AUTO: 27.3 PG (ref 27–31)
MCHC RBC AUTO-ENTMCNC: 30.5 G/DL (ref 32–36)
MCV RBC AUTO: 89 FL (ref 82–98)
PLATELET # BLD AUTO: 213 K/UL (ref 150–450)
PMV BLD AUTO: 9.7 FL (ref 9.2–12.9)
POCT GLUCOSE: 126 MG/DL (ref 70–110)
POCT GLUCOSE: 131 MG/DL (ref 70–110)
POTASSIUM SERPL-SCNC: 4.8 MMOL/L (ref 3.5–5.1)
PREALB SERPL-MCNC: 37.8 MG/DL (ref 20–43)
PROT SERPL-MCNC: 6.1 G/DL (ref 6–8.4)
RBC # BLD AUTO: 3.3 M/UL (ref 4.6–6.2)
SODIUM SERPL-SCNC: 135 MMOL/L (ref 136–145)
WBC # BLD AUTO: 6.9 K/UL (ref 3.9–12.7)

## 2025-02-17 PROCEDURE — 85027 COMPLETE CBC AUTOMATED: CPT | Performed by: INTERNAL MEDICINE

## 2025-02-17 PROCEDURE — 36415 COLL VENOUS BLD VENIPUNCTURE: CPT | Performed by: INTERNAL MEDICINE

## 2025-02-17 PROCEDURE — 82306 VITAMIN D 25 HYDROXY: CPT | Performed by: INTERNAL MEDICINE

## 2025-02-17 PROCEDURE — 63600175 PHARM REV CODE 636 W HCPCS: Mod: JZ,TB | Performed by: INTERNAL MEDICINE

## 2025-02-17 PROCEDURE — 25000003 PHARM REV CODE 250: Performed by: NURSE PRACTITIONER

## 2025-02-17 PROCEDURE — 63600175 PHARM REV CODE 636 W HCPCS: Performed by: NURSE PRACTITIONER

## 2025-02-17 PROCEDURE — 80053 COMPREHEN METABOLIC PANEL: CPT | Performed by: INTERNAL MEDICINE

## 2025-02-17 PROCEDURE — 36415 COLL VENOUS BLD VENIPUNCTURE: CPT | Performed by: NURSE PRACTITIONER

## 2025-02-17 PROCEDURE — 83735 ASSAY OF MAGNESIUM: CPT | Performed by: INTERNAL MEDICINE

## 2025-02-17 PROCEDURE — 25000003 PHARM REV CODE 250: Performed by: INTERNAL MEDICINE

## 2025-02-17 PROCEDURE — 25000003 PHARM REV CODE 250: Performed by: STUDENT IN AN ORGANIZED HEALTH CARE EDUCATION/TRAINING PROGRAM

## 2025-02-17 PROCEDURE — 84134 ASSAY OF PREALBUMIN: CPT | Performed by: INTERNAL MEDICINE

## 2025-02-17 PROCEDURE — 83605 ASSAY OF LACTIC ACID: CPT | Performed by: NURSE PRACTITIONER

## 2025-02-17 RX ORDER — SODIUM FERRIC GLUCONATE COMPLEX IN SUCROSE 12.5 MG/ML
125 INJECTION INTRAVENOUS ONCE
Status: COMPLETED | OUTPATIENT
Start: 2025-02-17 | End: 2025-02-17

## 2025-02-17 RX ORDER — CIPROFLOXACIN 500 MG/1
500 TABLET ORAL EVERY 12 HOURS
Qty: 10 TABLET | Refills: 0 | Status: SHIPPED | OUTPATIENT
Start: 2025-02-17 | End: 2025-02-22

## 2025-02-17 RX ORDER — OXYCODONE AND ACETAMINOPHEN 10; 325 MG/1; MG/1
1 TABLET ORAL EVERY 8 HOURS PRN
Qty: 15 TABLET | Refills: 0 | Status: SHIPPED | OUTPATIENT
Start: 2025-02-17

## 2025-02-17 RX ORDER — METRONIDAZOLE 500 MG/1
500 TABLET ORAL EVERY 8 HOURS
Qty: 15 TABLET | Refills: 0 | Status: SHIPPED | OUTPATIENT
Start: 2025-02-17 | End: 2025-02-22

## 2025-02-17 RX ADMIN — METOPROLOL SUCCINATE 50 MG: 50 TABLET, FILM COATED, EXTENDED RELEASE ORAL at 09:02

## 2025-02-17 RX ADMIN — FAMOTIDINE 20 MG: 20 TABLET, FILM COATED ORAL at 09:02

## 2025-02-17 RX ADMIN — MORPHINE SULFATE 4 MG: 4 INJECTION, SOLUTION INTRAMUSCULAR; INTRAVENOUS at 06:02

## 2025-02-17 RX ADMIN — ATORVASTATIN CALCIUM 10 MG: 10 TABLET, FILM COATED ORAL at 09:02

## 2025-02-17 RX ADMIN — METRONIDAZOLE 500 MG: 500 INJECTION, SOLUTION INTRAVENOUS at 06:02

## 2025-02-17 RX ADMIN — LACTOBACILLUS TAB 1 TABLET: TAB at 06:02

## 2025-02-17 RX ADMIN — METHYLPREDNISOLONE SODIUM SUCCINATE 40 MG: 40 INJECTION, POWDER, FOR SOLUTION INTRAMUSCULAR; INTRAVENOUS at 01:02

## 2025-02-17 RX ADMIN — CIPROFLOXACIN 400 MG: 2 INJECTION, SOLUTION INTRAVENOUS at 04:02

## 2025-02-17 RX ADMIN — DIPHENHYDRAMINE HYDROCHLORIDE 25 MG: 25 CAPSULE ORAL at 04:02

## 2025-02-17 RX ADMIN — HYDROCODONE BITARTRATE AND ACETAMINOPHEN 1 TABLET: 5; 325 TABLET ORAL at 04:02

## 2025-02-17 RX ADMIN — PREGABALIN 50 MG: 25 CAPSULE ORAL at 09:02

## 2025-02-17 RX ADMIN — ONDANSETRON 4 MG: 2 INJECTION INTRAMUSCULAR; INTRAVENOUS at 09:02

## 2025-02-17 RX ADMIN — HYDROCODONE BITARTRATE AND ACETAMINOPHEN 1 TABLET: 5; 325 TABLET ORAL at 09:02

## 2025-02-17 RX ADMIN — BUSPIRONE HYDROCHLORIDE 15 MG: 5 TABLET ORAL at 09:02

## 2025-02-17 RX ADMIN — SODIUM FERRIC GLUCONATE COMPLEX IN SUCROSE 125 MG: 12.5 INJECTION INTRAVENOUS at 12:02

## 2025-02-17 RX ADMIN — ESCITALOPRAM OXALATE 20 MG: 10 TABLET ORAL at 09:02

## 2025-02-17 NOTE — ASSESSMENT & PLAN NOTE
Anemia due to blood loss secondary to GI bleed.     Patient's hemorrhage is due to gastrointestinal bleed, this bleeding is associated with an anticoagulant, the anticoagulant is Select Anticoagulant(s): Direct oral anticoagulant: Rivaroxaban (Xarelto). Patients most recent Hgb, Hct, platelets, and INR are listed below.  Recent Labs     02/15/25  1353 02/16/25  0916 02/17/25  0802   HGB 9.9*  9.9* 8.9* 9.0*   HCT 33.4*  33.4* 29.1* 29.5*     345 256 213   INR 1.0  --   --        Plan  - Will trend hemoglobin/hematocrit Daily  - Will monitor and correct any coagulation defects  - Will transfuse if Hgb is <7g/dl (<8g/dl in cases of active ACS) or if patient has rapid bleeding leading to hemodynamic instability  - hold xarelto  - ferritin 9.7, iron 26,% saturation 8%  - transfused Ferrlecit 125 mg x 1 dose

## 2025-02-17 NOTE — ASSESSMENT & PLAN NOTE
Patient's FSGs are uncontrolled due to hyperglycemia on current medication regimen.  Last A1c reviewed-   Lab Results   Component Value Date    HGBA1C 5.8 11/02/2024     Most recent fingerstick glucose reviewed-   Recent Labs   Lab 02/16/25  1045 02/16/25  1546 02/17/25  0654   POCTGLUCOSE 136* 156* 131*       Current correctional scale  Low  Maintain anti-hyperglycemic dose as follows-   Antihyperglycemics (From admission, onward)    Start     Stop Route Frequency Ordered    02/15/25 1703  insulin aspart U-100 pen 0-5 Units         -- SubQ Before meals & nightly PRN 02/15/25 1603        Hold Oral hypoglycemics while patient is in the hospital.

## 2025-02-17 NOTE — DISCHARGE SUMMARY
[facility] Medicine  Discharge Summary      Patient Name: Jacoby Jean-Baptiste  MRN: [id number]  DAYSI: [id number]  Patient Class: IP- Inpatient  Admission Date: 2/15/2025  Hospital Length of Stay: 1 days  Discharge Date and Time:  02/17/2025 1:33 PM  Attending Physician: No att. providers found   Discharging Provider: JAMIA Jordan  Primary Care Provider: Hunter Aguilar III, MD    Primary Care Team: Networked reference to record PCT     HPI:   50 year old male with a past medical history of C. Diff colitis, cavitary pneumonia, diabetes, hypertension, hyperlipidemia, Ulcerative Colitis who present to the emergency room with diffuse abdominal pain, bloody diarrhea, nausea. Reports having 12 bowel movements with blood since last night. Denies fever and chills, dysuria, hematuria, focal weakness, malaise.     In the ER, no leukocytosis, anemia H/H 9.9/33.4, glucose 126, Ca 8.5, Albumin 3.4, Occult blood +, CT abdomen hepatomegaly, nonobstructing renal stones, colitis.   IV cipro and LR given in ER, IV methylprednisolone 125 mg IV given.     Admit to hospital medicine with Colitis, GI bleed.     Of Note:   Per Dr. Jean:   CHART REVIEW:   CT ABd 2/'25 - persistent colitis; s/p karina; otherwise nml abd  Colon 1/'25 - L sided colitis  Calpro 6750 1/'25  Tremfaya started 12/'24 due to lack fo response to Omvoh  Labs 11/'24 - Neg giardia / c. diff; calpro 1050  Calprotectin 9/'24 - increaing to 2310 despite Omvoh (Was decreasing initially)  Stool PCR - Negative.   Flex 8/'24 - chronic (improved) inflammation; CMV neg  EGD/colon 6/'24 - small HH; ampullary tic; mild ileitis (TI nml previously.?backwash?), L sided colitis  -reflux esophagitis. focal intestinal metaplasia antrum. Mild ileitis; sigmoid/proctitis; findings c/w UC; CMV negative in L sided biopsies  Labs 6/'24 -  Calprotectin 2480 on Omvoh at around 2 months. (Was >8K on entyvio but had near normalized on remicade)  Hospitalization  5/'24 - colitis on CT scan; Neg C. diff   Hospitalization 3/'24 - Calpro >8K; flex w/ severe L sided UC; placed on steroids. C. diff neg;....d/c entyvio as lack of response  Hospitalization 2/'24; CT 2/'24 - fatty liver; L sided colitis; Calpro back up ot 3070 (was 201 on remicade); Neg C. diff  Entyvio started 1/'24  Hospitalization 11/'23 - cavitary fungal lesions...Remicade stopped  Labs 10/'23 - C.diff negative after vanco and rebyota enemas.  Labs 7/'23 -  Calprotectin 201 on remicade w/ C. diff...tx w/ vanco w/ long taper +/- rebyota; Nml CRP much better on remicade  EGD 4/'23 - candidate esophagitis / duodenitis; HP neg gastirits  Hospitalization 4/'23 - colitis on CT; C. diff neg; Remicade loaded  Colon 3/'23 - Pan-colitis; Nml TI; s/p FMT for refractory C. diff  C. DIff + 3/'23  Labs 1/'23 - CRP 21; Stool PCR + C. DIff....  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     * No surgery found *      Hospital Course:   50-year-old male with a past medical history of ulcerative colitis with several hospitalizations for exacerbations, C difficile who presented to the emergency department for the evaluation rectal bleeding and abdominal pain.  Patient was evaluated in the emergency department.  Leukocytosis noted, CT of abdomen and pelvis with mild long segment circumferential colonic wall thickening with fat stranding in the descending and rectosigmoid colon consistent with colitis without findings of bowel obstruction intra-abdominal air for abscesses.  Inflammatory markers within normal limits.  Patient was started on Cipro Flagyl and methylprednisolone IV.  GI was consulted.  GI PCR revealed norovirus, fecal calprotectin were ordered and pending.  Patient was monitored closely.  His pain was treated.  Patient continued to improve.  No further bloody diarrhea.  He did test positive for norovirus.  C diff pending.  Lactic acid improving,  patient is not hypotensive or acidotic.  Inflammatory markers CRP and sed rate within normal limits.  He was found to be iron deficient anemic.  He was given Ferrlecit 125 mg x 1 dose.  Patient tolerating diet without nausea or vomiting.  Pain is controlled with oral pain medication.  We will discharge patient home with follow up to his gastroenterologist Dr. Beal in 1-2 weeks as well as follow up with his colorectal surgeon for plan colectomy in March.  He is instructed to return to the emergency department for any concerning symptoms.     Goals of Care Treatment Preferences:  Code Status: Full Code      SDOH Screening:  The patient was screened for utility difficulties, food insecurity, transport difficulties, housing insecurity, and interpersonal safety and there were no concerns identified this admission.     Consults:   Consults (From admission, onward)          Status Ordering Provider     Inpatient consult to Gastroenterology  Once        Provider:  Enoc Kebede MD    Completed TANI STALEY            * Lower GI bleed secondary to colitis  Anemia due to blood loss secondary to GI bleed.     Patient's hemorrhage is due to gastrointestinal bleed, this bleeding is associated with an anticoagulant, the anticoagulant is Select Anticoagulant(s): Direct oral anticoagulant: Rivaroxaban (Xarelto). Patients most recent Hgb, Hct, platelets, and INR are listed below.  Recent Labs     02/15/25  1353 02/16/25  0916 02/17/25  0802   HGB 9.9*  9.9* 8.9* 9.0*   HCT 33.4*  33.4* 29.1* 29.5*     345 256 213   INR 1.0  --   --        Plan  - Will trend hemoglobin/hematocrit Daily  - Will monitor and correct any coagulation defects  - Will transfuse if Hgb is <7g/dl (<8g/dl in cases of active ACS) or if patient has rapid bleeding leading to hemodynamic instability  - hold xarelto  - ferritin 9.7, iron 26,% saturation 8%  - transfused Ferrlecit 125 mg x 1 dose      Essential hypertension  Patient's blood  pressure range in the last 24 hours was: BP  Min: 138/76  Max: 155/84.The patient's inpatient anti-hypertensive regimen is listed below:  Current Antihypertensives  metoprolol succinate (TOPROL-XL) 24 hr tablet 50 mg, Daily, Oral    Plan  - BP is controlled, no changes needed to their regimen      Ulcerative colitis  Versus IC with bloody diarrhea,   Hx of fecal transplant, proctocolitis    Consult GI  IV hydration, pain control  Clear liquids  Continue w/ budesonide  Continue Tremfaya (not on formulary- may take home med   Continue Xeljanz 10 mg twice daily.   Continue Lyrica  Patient is scheduled for surgery March 6 2025 with Dr Church at [facility]  Continue IV Methylpednisolone  Start cipro and metronidazole            Type 2 diabetes mellitus without complication, without long-term current use of insulin  Patient's FSGs are uncontrolled due to hyperglycemia on current medication regimen.  Last A1c reviewed-   Lab Results   Component Value Date    HGBA1C 5.8 11/02/2024     Most recent fingerstick glucose reviewed-   Recent Labs   Lab 02/16/25  1045 02/16/25  1546 02/17/25  0654   POCTGLUCOSE 136* 156* 131*       Current correctional scale  Low  Maintain anti-hyperglycemic dose as follows-   Antihyperglycemics (From admission, onward)      Start     Stop Route Frequency Ordered    02/15/25 1703  insulin aspart U-100 pen 0-5 Units         -- SubQ Before meals & nightly PRN 02/15/25 1603          Hold Oral hypoglycemics while patient is in the hospital.          Final Active Diagnoses:    Diagnosis Date Noted POA    PRINCIPAL PROBLEM:  Lower GI bleed secondary to colitis [K92.2] 04/20/2023 Yes    Essential hypertension [I10] 02/15/2025 Yes    Ulcerative colitis [K51.90] 01/11/2023 Yes    Type 2 diabetes mellitus without complication, without long-term current use of insulin [E11.9] 01/29/2022 Yes      Problems Resolved During this Admission:       Discharged Condition: good    Disposition: Home or Self Care    Follow  Up:   Follow-up Information       Hunter Aguilar III, MD Follow up in 1 week(s).    Specialty: Family Medicine  Why: In basket  Contact information:  [address]  [address]  [place] [address]  [phone]               Jessica Church MD Follow up.    Specialty: Colon and Rectal Surgery  Why: as scheduled in March for colectomy  Contact information:  [address]  [address]  [place] [address]  [phone]               Irma Beal,  Follow up in 1 week(s).    Specialty: Gastroenterology  Contact information:  [facility]  [address]  [address]  [place] [address]  [phone]                           Patient Instructions:      Diet Cardiac     Notify your health care provider if you experience any of the following:  temperature >100.4     Notify your health care provider if you experience any of the following:  persistent nausea and vomiting or diarrhea     Notify your health care provider if you experience any of the following:  severe uncontrolled pain     Notify your health care provider if you experience any of the following:  redness, tenderness, or signs of infection (pain, swelling, redness, odor or green/yellow discharge around incision site)     Notify your health care provider if you experience any of the following:  difficulty breathing or increased cough     Notify your health care provider if you experience any of the following:  severe persistent headache     Notify your health care provider if you experience any of the following:  worsening rash     Notify your health care provider if you experience any of the following:  persistent dizziness, light-headedness, or visual disturbances     Notify your health care provider if you experience any of the following:  increased confusion or weakness     Activity as tolerated       Significant Diagnostic Studies: Labs: All labs within the past 24 hours have been reviewed    Pending Diagnostic Studies:       Procedure  Component Value Units Date/Time    Calprotectin, Stool [[id number]] Collected: 02/16/25 1432    Order Status: Sent Lab Status: In process Updated: 02/16/25 1450    Specimen: Stool     Stool Exam-Ova,Cysts,Parasites [[id number]] Collected: 02/16/25 0630    Order Status: Sent Lab Status: In process Updated: 02/16/25 0635    Specimen: Stool     Urinalysis, Reflex to Urine Culture Urine, Clean Catch [[id number]] Collected: 02/15/25 1625    Order Status: Sent Lab Status: In process Updated: 02/15/25 1626    Specimen: Urine            Medications:  Reconciled Home Medications:      Medication List        START taking these medications      ciprofloxacin HCl 500 MG tablet  Commonly known as: CIPRO  Take 1 tablet (500 mg total) by mouth every 12 (twelve) hours. for 5 days     metroNIDAZOLE 500 MG tablet  Commonly known as: FLAGYL  Take 1 tablet (500 mg total) by mouth every 8 (eight) hours. for 5 days     oxyCODONE-acetaminophen  mg per tablet  Commonly known as: PERCOCET  Take 1 tablet by mouth every 8 (eight) hours as needed for Pain.            CONTINUE taking these medications      budesonide 3 mg capsule  Commonly known as: ENTOCORT EC  Take 3 capsules (9 mg total) by mouth once daily.     busPIRone 15 MG tablet  Commonly known as: BUSPAR  Take 1 tablet (15 mg total) by mouth 3 (three) times daily.     diazePAM 5 MG tablet  Commonly known as: VALIUM  Take 1 tablet (5 mg total) by mouth daily as needed for Anxiety.     EScitalopram oxalate 20 MG tablet  Commonly known as: LEXAPRO  Take 1 tablet (20 mg total) by mouth once daily.     metoprolol succinate 50 MG 24 hr tablet  Commonly known as: TOPROL-XL  Take 1 tablet (50 mg total) by mouth once daily.     pantoprazole 40 MG tablet  Commonly known as: PROTONIX  Take 40 mg by mouth 2 (two) times daily.     pregabalin 50 MG capsule  Commonly known as: LYRICA  Take 1 capsule by mouth 2 (two) times daily.     promethazine 25 MG tablet  Commonly known as: PHENERGAN  Take  25 mg by mouth 4 (four) times daily as needed for Nausea.     rivaroxaban 20 mg Tab  Commonly known as: XARELTO  Take 1 tablet (20 mg total) by mouth daily with dinner or evening meal.     simvastatin 20 MG tablet  Commonly known as: ZOCOR  Take 1 tablet (20 mg total) by mouth every evening.     VSL#3 112.5 billion cell Cap  Generic drug: Lactobac 2-Bifido 1-S. therm  Take 1 capsule by mouth twice a day     XELJANZ 10 mg Tab  Generic drug: tofacitinib  Take 10 mg by mouth 2 (two) times daily.     zolpidem 10 mg Tab  Commonly known as: AMBIEN  Take 1 tablet (10 mg total) by mouth nightly as needed (insomnia).            STOP taking these medications      diphenoxylate-atropine 2.5-0.025 mg 2.5-0.025 mg per tablet  Commonly known as: LOMOTIL              Indwelling Lines/Drains at time of discharge:   Lines/Drains/Airways       None                   Time spent on the discharge of patient: 35 minutes         JAMIA Jordan  Department of Hospital Medicine  [facility]

## 2025-02-17 NOTE — ASSESSMENT & PLAN NOTE
Versus IC with bloody diarrhea,   Hx of fecal transplant, proctocolitis    Consult GI  IV hydration, pain control  Clear liquids  Continue w/ budesonide  Continue Tremfaya (not on formulary- may take home med   Continue Xeljanz 10 mg twice daily.   Continue Lyrica  Patient is scheduled for surgery March 6 2025 with Dr Church at [facility]  Continue IV Methylpednisolone  Start cipro and metronidazole

## 2025-02-17 NOTE — PLAN OF CARE
Discharge orders and chart reviewed with no further post-acute discharge needs identified at this time.  At this time, patient is cleared for discharge from Case Management standpoint.            02/17/25 1056   Final Note   Assessment Type Final Discharge Note   Anticipated Discharge Disposition Home   Hospital Resources/Appts/Education Provided Appointments scheduled and added to AVS   Post-Acute Status   Discharge Delays None known at this time

## 2025-02-17 NOTE — ASSESSMENT & PLAN NOTE
Patient's blood pressure range in the last 24 hours was: BP  Min: 138/76  Max: 155/84.The patient's inpatient anti-hypertensive regimen is listed below:  Current Antihypertensives  metoprolol succinate (TOPROL-XL) 24 hr tablet 50 mg, Daily, Oral    Plan  - BP is controlled, no changes needed to their regimen

## 2025-02-17 NOTE — PROGRESS NOTES
GASTROENTEROLOGY INPATIENT PROGRESS NOTE  Patient Name: Jacoby Jean-Baptiste  Patient MRN: [id number]  Patient : 1975    Admit Date: 2/15/2025  Service date: 2025    PCP: Hunter Aguilar III, MD      HPI: Patient is a 50 y.o. male with PMHx  50 y.o. male with PMHx  severe refractory UC well known to our service currently on tremfya and started xeljanz on Wed after meeting Dr Irma Beal GI at [facility].  Has total colectomy planned early march. Presented due to recurrence of loose explosive bowel movements associated with some rectal bleeding.    .       S:  Patient reports pain is fairly well controlled.  Diarrhea is at his baseline.  He is tolerating his diet and is anticipated for discharge home.     Inpatient Medications:   atorvastatin  10 mg Oral Daily    busPIRone  15 mg Oral TID    ciprofloxacin  400 mg Intravenous Q12H    EScitalopram oxalate  20 mg Oral Daily    famotidine  20 mg Oral BID    ferric gluconate  125 mg Intravenous Once    Lactobacillus acidoph-L.bulgar  1 tablet Oral TID WM    methylPREDNISolone injection (PEDS and ADULTS)  40 mg Intravenous Q12H    metoprolol succinate  50 mg Oral Daily    metroNIDAZOLE IV (PEDS and ADULTS)  500 mg Intravenous Q8H    pregabalin  50 mg Oral BID    tofacitinib  10 mg Oral BID         Review of Systems:  GENERAL: No fever, chills, weight loss  SKIN: No rashes, jaundice, pruritus  HEENT: No rhinorrhea, epistaxis, vision changes.  No trauma, tinnitus, lymphadenopathy or pharyngitis  CV: No chest pain, palpitations, edima or BAILEY  PULM: No cough or sputum production.  No wheezing.  GI: AS IN HPI  URINARY:  No hematuria, dysuria  MS: No change in muscle or joint pain, weakness, or ROM  Neuro: No focal neurologic changes  PSYCH: No change in mood or personality.  No suicidal ideation.  ENDOCRINE: No fatigue      OBJECTIVE:    Vitals:    25 0400 25 0617 25 0655 25 0950   BP:   (!) 155/84    BP Location:   Left arm    Patient Position:    Lying    Pulse:   (!) 59    Resp: 16 16 16 18   Temp:   97.6 °F (36.4 °C)    TempSrc:   Oral    SpO2:   96%    Weight:       Height:           Physical Exam:    GEN: well-developed, well-nourished, awake and alert, non-toxic appearing adult  HEENT: PERRL, sclera anicteric, oral mucosa pink and moist without lesion  NECK: trachea midline; Good ROM  CV: regular rate and rhythm, no murmurs or gallops  RESP: clear to auscultation bilaterally, no wheezes, rhonci or rales  ABD: soft, non-tender, non-distended, normal bowel sounds  EXT: no swelling or edema, 2+ pulses distally  SKIN: no rashes or jaundice  PSYCH: normal affect    Labs:   Recent Labs   Lab 02/15/25  1353 02/16/25  0916 02/17/25  0802   WBC 12.11  12.11 7.39 6.90   HGB 9.9*  9.9* 8.9* 9.0*     345 256 213   MCV 91  91 89 89     Recent Labs   Lab 02/16/25  0916   IRON 26*   FERRITIN 9.7*     Recent Labs   Lab 02/15/25  1353 02/16/25  0700 02/17/25  0802     144 138 135*   K 3.7  3.7 4.9 4.8   BUN 10  10 13 12   CREATININE 1.0  1.0 1.0 0.9   ALBUMIN 3.4*  3.4* 3.1* 3.3*   AST 13  13 11 11   ALT 17  17 13 13   ALKPHOS 63  63 58 54*   INR 1.0  --   --          Radiology Review:        IMPRESSION / RECOMMENDATIONS:  50 y.o. male with PMHx HLD, DM on intermittent ozempic, HTN, LOLIS, umbilical hernia repair, tobacco use, recurrent C. diff s/p Vanco / dificid / FMT / rebyota, fungal cavitary lesions (while on remicade) presents for evaluation of diarrhea. C.diff negative at this point and currently finishing up induction doses of tremfaya. Unforatunately, has had difficult to treat UC w/ persistent disease on colon in 1/'25. Colectomy has been considered but discussed risk / benefits and long term issues.   New diagnosis of norovirus  Appreciate consultants at [facility].  Patient feels confident with current plan to proceed with colectomy March 6, 2025 and Xeljanz has been added to his current regimen.  Discharge anticipated for today          Odalis Mccabe  2/17/2025  10:07 AM

## 2025-02-18 ENCOUNTER — PATIENT OUTREACH (OUTPATIENT)
Dept: FAMILY MEDICINE | Facility: CLINIC | Age: 50
End: 2025-02-18
Payer: COMMERCIAL

## 2025-02-18 ENCOUNTER — LAB VISIT (OUTPATIENT)
Dept: LAB | Facility: HOSPITAL | Age: 50
End: 2025-02-18
Attending: INTERNAL MEDICINE
Payer: COMMERCIAL

## 2025-02-18 ENCOUNTER — PATIENT MESSAGE (OUTPATIENT)
Dept: FAMILY MEDICINE | Facility: CLINIC | Age: 50
End: 2025-02-18
Payer: COMMERCIAL

## 2025-02-18 DIAGNOSIS — K51.00 ULCERATIVE (CHRONIC) ENTEROCOLITIS: Primary | ICD-10-CM

## 2025-02-18 DIAGNOSIS — D84.9 IMMUNOSUPPRESSION-RELATED INFECTIOUS DISEASE: ICD-10-CM

## 2025-02-18 DIAGNOSIS — Z86.19 PERSONAL HISTORY OF UNSPECIFIED INFECTIOUS AND PARASITIC DISEASE: ICD-10-CM

## 2025-02-18 DIAGNOSIS — K51.011 ULCERATIVE PANCOLITIS WITH RECTAL BLEEDING: Primary | ICD-10-CM

## 2025-02-18 DIAGNOSIS — L29.9 ITCHING: ICD-10-CM

## 2025-02-18 DIAGNOSIS — B99.8 IMMUNOSUPPRESSION-RELATED INFECTIOUS DISEASE: ICD-10-CM

## 2025-02-18 DIAGNOSIS — Z86.718 PERSONAL HISTORY OF VENOUS THROMBOSIS AND EMBOLISM: ICD-10-CM

## 2025-02-18 LAB
25(OH)D3+25(OH)D2 SERPL-MCNC: 14 NG/ML (ref 30–96)
ALBUMIN SERPL BCP-MCNC: 3.5 G/DL (ref 3.5–5.2)
ALP SERPL-CCNC: 52 U/L (ref 55–135)
ALT SERPL W/O P-5'-P-CCNC: 17 U/L (ref 10–44)
ANION GAP SERPL CALC-SCNC: 7 MMOL/L (ref 8–16)
AST SERPL-CCNC: 14 U/L (ref 10–40)
BACTERIA STL CULT: NORMAL
BACTERIA STL CULT: NORMAL
BASOPHILS # BLD AUTO: 0.01 K/UL (ref 0–0.2)
BASOPHILS NFR BLD: 0.1 % (ref 0–1.9)
BILIRUB SERPL-MCNC: 0.3 MG/DL (ref 0.1–1)
BUN SERPL-MCNC: 11 MG/DL (ref 6–20)
CALCIUM SERPL-MCNC: 8.2 MG/DL (ref 8.7–10.5)
CHLORIDE SERPL-SCNC: 105 MMOL/L (ref 95–110)
CO2 SERPL-SCNC: 28 MMOL/L (ref 23–29)
CREAT SERPL-MCNC: 1 MG/DL (ref 0.5–1.4)
CRP SERPL-MCNC: 0.1 MG/DL
DIFFERENTIAL METHOD BLD: ABNORMAL
EOSINOPHIL # BLD AUTO: 0.1 K/UL (ref 0–0.5)
EOSINOPHIL NFR BLD: 0.7 % (ref 0–8)
ERYTHROCYTE [DISTWIDTH] IN BLOOD BY AUTOMATED COUNT: 20.1 % (ref 11.5–14.5)
ERYTHROCYTE [SEDIMENTATION RATE] IN BLOOD BY WESTERGREN METHOD: 2 MM/HR (ref 0–10)
EST. GFR  (NO RACE VARIABLE): >60 ML/MIN/1.73 M^2
FERRITIN SERPL-MCNC: 84.6 NG/ML (ref 20–300)
GLUCOSE SERPL-MCNC: 93 MG/DL (ref 70–110)
HCT VFR BLD AUTO: 31.2 % (ref 40–54)
HGB BLD-MCNC: 9 G/DL (ref 14–18)
IMM GRANULOCYTES # BLD AUTO: 0.04 K/UL (ref 0–0.04)
IMM GRANULOCYTES NFR BLD AUTO: 0.5 % (ref 0–0.5)
IRON SERPL-MCNC: 323 UG/DL (ref 45–160)
LYMPHOCYTES # BLD AUTO: 2.6 K/UL (ref 1–4.8)
LYMPHOCYTES NFR BLD: 30.4 % (ref 18–48)
MCH RBC QN AUTO: 26.6 PG (ref 27–31)
MCHC RBC AUTO-ENTMCNC: 28.8 G/DL (ref 32–36)
MCV RBC AUTO: 92 FL (ref 82–98)
MONOCYTES # BLD AUTO: 1 K/UL (ref 0.3–1)
MONOCYTES NFR BLD: 12 % (ref 4–15)
NEUTROPHILS # BLD AUTO: 4.8 K/UL (ref 1.8–7.7)
NEUTROPHILS NFR BLD: 56.3 % (ref 38–73)
NRBC BLD-RTO: 0 /100 WBC
PLATELET # BLD AUTO: 254 K/UL (ref 150–450)
PMV BLD AUTO: 10 FL (ref 9.2–12.9)
POTASSIUM SERPL-SCNC: 3.5 MMOL/L (ref 3.5–5.1)
PROT SERPL-MCNC: 5.8 G/DL (ref 6–8.4)
RBC # BLD AUTO: 3.38 M/UL (ref 4.6–6.2)
SATURATED IRON: 93 % (ref 20–50)
SODIUM SERPL-SCNC: 140 MMOL/L (ref 136–145)
TOTAL IRON BINDING CAPACITY: 349 UG/DL (ref 250–450)
TRANSFERRIN SERPL-MCNC: 249 MG/DL (ref 200–375)
WBC # BLD AUTO: 8.43 K/UL (ref 3.9–12.7)

## 2025-02-18 PROCEDURE — 85651 RBC SED RATE NONAUTOMATED: CPT | Performed by: INTERNAL MEDICINE

## 2025-02-18 PROCEDURE — 82306 VITAMIN D 25 HYDROXY: CPT | Performed by: INTERNAL MEDICINE

## 2025-02-18 PROCEDURE — 82728 ASSAY OF FERRITIN: CPT | Performed by: INTERNAL MEDICINE

## 2025-02-18 PROCEDURE — 83520 IMMUNOASSAY QUANT NOS NONAB: CPT | Performed by: INTERNAL MEDICINE

## 2025-02-18 PROCEDURE — 82784 ASSAY IGA/IGD/IGG/IGM EACH: CPT | Mod: 91 | Performed by: INTERNAL MEDICINE

## 2025-02-18 PROCEDURE — 36415 COLL VENOUS BLD VENIPUNCTURE: CPT | Performed by: INTERNAL MEDICINE

## 2025-02-18 PROCEDURE — 84466 ASSAY OF TRANSFERRIN: CPT | Performed by: INTERNAL MEDICINE

## 2025-02-18 PROCEDURE — 85025 COMPLETE CBC W/AUTO DIFF WBC: CPT | Performed by: INTERNAL MEDICINE

## 2025-02-18 PROCEDURE — 82784 ASSAY IGA/IGD/IGG/IGM EACH: CPT | Performed by: INTERNAL MEDICINE

## 2025-02-18 PROCEDURE — 86140 C-REACTIVE PROTEIN: CPT | Performed by: INTERNAL MEDICINE

## 2025-02-18 PROCEDURE — 80053 COMPREHEN METABOLIC PANEL: CPT | Performed by: INTERNAL MEDICINE

## 2025-02-18 NOTE — TELEPHONE ENCOUNTER
Discharge Information    Discharge Date:   2/17/25    Primary Discharge Diagnosis:  LOWER GI BLEED SECONDARY TO COLITIS     Discharge Summary:  Reviewed      Medication & Order Review    Were medication changes made or new medications added?   Yes    If so, has the patient filled the prescriptions?  Yes    Was Home Health ordered? No    If so, has Home Health contacted patient and/or initiated services?  No    Name of Home Health Agency? N/A    Durable Medical Equipment ordered?  No    If so, has the DME provider contacted patient and delivered equipment?  N/A    Follow Up    Any problems since discharge? No    How is the patient feeling since returning home?  PATIENT STATED HE IS FINE     Have you set up recommended follow up appointments?  SCHEDULED WITH DR MORALES    Schedule Hospital Follow-up appointment within 7-14 days (preferably 7). FEB 25 2025 AT 9:30 DR MORALES     Notes:  [facility] CAN BE BILLED       Isabel Lyman LPN

## 2025-02-19 ENCOUNTER — HOSPITAL ENCOUNTER (EMERGENCY)
Facility: HOSPITAL | Age: 50
Discharge: HOME | End: 2025-02-19
Attending: EMERGENCY MEDICINE
Payer: COMMERCIAL

## 2025-02-19 ENCOUNTER — PATIENT OUTREACH (OUTPATIENT)
Dept: ADMINISTRATIVE | Facility: CLINIC | Age: 50
End: 2025-02-19
Payer: COMMERCIAL

## 2025-02-19 VITALS
RESPIRATION RATE: 18 BRPM | WEIGHT: 300 LBS | DIASTOLIC BLOOD PRESSURE: 82 MMHG | HEART RATE: 76 BPM | HEIGHT: 66 IN | TEMPERATURE: 98 F | OXYGEN SATURATION: 100 % | SYSTOLIC BLOOD PRESSURE: 136 MMHG | BODY MASS INDEX: 48.21 KG/M2

## 2025-02-19 DIAGNOSIS — E86.0 DEHYDRATION: Primary | ICD-10-CM

## 2025-02-19 LAB
ALBUMIN SERPL BCP-MCNC: 2.9 G/DL (ref 3.5–5.2)
ALP SERPL-CCNC: 47 U/L (ref 55–135)
ALT SERPL W/O P-5'-P-CCNC: 23 U/L (ref 10–44)
ANION GAP SERPL CALC-SCNC: 4 MMOL/L (ref 8–16)
AST SERPL-CCNC: 26 U/L (ref 10–40)
BASOPHILS # BLD AUTO: 0.02 K/UL (ref 0–0.2)
BASOPHILS NFR BLD: 0.2 % (ref 0–1.9)
BILIRUB SERPL-MCNC: 0.3 MG/DL (ref 0.1–1)
BUN SERPL-MCNC: 10 MG/DL (ref 6–20)
CALCIUM SERPL-MCNC: 8 MG/DL (ref 8.7–10.5)
CALPROTECTIN STL-MCNT: 2630 UG/G (ref 0–120)
CHLORIDE SERPL-SCNC: 108 MMOL/L (ref 95–110)
CO2 SERPL-SCNC: 30 MMOL/L (ref 23–29)
CREAT SERPL-MCNC: 1.2 MG/DL (ref 0.5–1.4)
DIFFERENTIAL METHOD BLD: ABNORMAL
EOSINOPHIL # BLD AUTO: 0.1 K/UL (ref 0–0.5)
EOSINOPHIL NFR BLD: 0.6 % (ref 0–8)
ERYTHROCYTE [DISTWIDTH] IN BLOOD BY AUTOMATED COUNT: 20.3 % (ref 11.5–14.5)
EST. GFR  (NO RACE VARIABLE): >60 ML/MIN/1.73 M^2
GLUCOSE SERPL-MCNC: 113 MG/DL (ref 70–110)
HCT VFR BLD AUTO: 27.7 % (ref 40–54)
HGB BLD-MCNC: 8.2 G/DL (ref 14–18)
IMM GRANULOCYTES # BLD AUTO: 0.06 K/UL (ref 0–0.04)
IMM GRANULOCYTES NFR BLD AUTO: 0.7 % (ref 0–0.5)
LIPASE SERPL-CCNC: 13 U/L (ref 4–60)
LYMPHOCYTES # BLD AUTO: 2.1 K/UL (ref 1–4.8)
LYMPHOCYTES NFR BLD: 25.1 % (ref 18–48)
MAGNESIUM SERPL-MCNC: 1.5 MG/DL (ref 1.6–2.6)
MCH RBC QN AUTO: 27.2 PG (ref 27–31)
MCHC RBC AUTO-ENTMCNC: 29.6 G/DL (ref 32–36)
MCV RBC AUTO: 92 FL (ref 82–98)
MONOCYTES # BLD AUTO: 0.7 K/UL (ref 0.3–1)
MONOCYTES NFR BLD: 7.8 % (ref 4–15)
NEUTROPHILS # BLD AUTO: 5.5 K/UL (ref 1.8–7.7)
NEUTROPHILS NFR BLD: 65.6 % (ref 38–73)
NRBC BLD-RTO: 0 /100 WBC
OHS QRS DURATION: 78 MS
OHS QTC CALCULATION: 443 MS
PLATELET # BLD AUTO: 202 K/UL (ref 150–450)
PMV BLD AUTO: 10.3 FL (ref 9.2–12.9)
POTASSIUM SERPL-SCNC: 4.1 MMOL/L (ref 3.5–5.1)
PROT SERPL-MCNC: 5.2 G/DL (ref 6–8.4)
RBC # BLD AUTO: 3.01 M/UL (ref 4.6–6.2)
SODIUM SERPL-SCNC: 142 MMOL/L (ref 136–145)
WBC # BLD AUTO: 8.35 K/UL (ref 3.9–12.7)

## 2025-02-19 PROCEDURE — 80053 COMPREHEN METABOLIC PANEL: CPT | Performed by: EMERGENCY MEDICINE

## 2025-02-19 PROCEDURE — 96360 HYDRATION IV INFUSION INIT: CPT

## 2025-02-19 PROCEDURE — 83735 ASSAY OF MAGNESIUM: CPT | Performed by: EMERGENCY MEDICINE

## 2025-02-19 PROCEDURE — 25000003 PHARM REV CODE 250: Performed by: EMERGENCY MEDICINE

## 2025-02-19 PROCEDURE — 93010 ELECTROCARDIOGRAM REPORT: CPT | Mod: ,,, | Performed by: INTERNAL MEDICINE

## 2025-02-19 PROCEDURE — 63600175 PHARM REV CODE 636 W HCPCS: Performed by: EMERGENCY MEDICINE

## 2025-02-19 PROCEDURE — 83690 ASSAY OF LIPASE: CPT | Performed by: EMERGENCY MEDICINE

## 2025-02-19 PROCEDURE — 96361 HYDRATE IV INFUSION ADD-ON: CPT

## 2025-02-19 PROCEDURE — 93005 ELECTROCARDIOGRAM TRACING: CPT | Performed by: INTERNAL MEDICINE

## 2025-02-19 PROCEDURE — 99284 EMERGENCY DEPT VISIT MOD MDM: CPT | Mod: 25

## 2025-02-19 PROCEDURE — 85025 COMPLETE CBC W/AUTO DIFF WBC: CPT | Performed by: EMERGENCY MEDICINE

## 2025-02-19 RX ORDER — ERGOCALCIFEROL 1.25 MG/1
1 CAPSULE ORAL
Status: ON HOLD | COMMUNITY
Start: 2025-02-19 | End: 2025-08-06

## 2025-02-19 RX ADMIN — SODIUM CHLORIDE 1000 ML: 9 INJECTION, SOLUTION INTRAVENOUS at 03:02

## 2025-02-19 RX ADMIN — SODIUM CHLORIDE, POTASSIUM CHLORIDE, SODIUM LACTATE AND CALCIUM CHLORIDE 1000 ML: 600; 310; 30; 20 INJECTION, SOLUTION INTRAVENOUS at 02:02

## 2025-02-19 NOTE — ED PROVIDER NOTES
Encounter Date: 2/19/2025       History     Chief Complaint   Patient presents with    Dizziness     Dizziness x 2 days. Seen over the weekend and dx w/ norovirus.      50-year-old male with a past medical history of ulcerative colitis, hypertension, diabetes mellitus, and hyperlipidemia presents for dizziness and lightheadedness.  He reports his symptoms started yesterday and has been persistent since that time.  There has been no associated cough, congestion, fever/chills, chest pain, shortness of breath, dysuria, or hematuria.  The patient reports that he was here over the past weekend for nausea, vomiting, and diarrhea.  He was diagnosed with a norovirus and sent home 2 days ago.  He reports a persistent decreased appetite still at home since that time.  He reports that he is no longer having any vomiting or diarrhea.  He also reports chronic abdominal pain that has not changed.  He reports that this is his chronic ulcerative colitis.      Review of patient's allergies indicates:  No Known Allergies  Past Medical History:   Diagnosis Date    C. difficile colitis     Cavitary pneumonia 11/2023    pos aspergillus serology    Diabetes mellitus 01/2022    Hyperlipidemia 2015    Hypertension 2015    Insomnia 2015    Ulcerative colitis      Past Surgical History:   Procedure Laterality Date    BRONCHOSCOPY WITH FLUOROSCOPY Left 11/20/2023    Procedure: BRONCHOSCOPY, WITH FLUOROSCOPY;  Surgeon: Lisa Villarreal MD;  Location: [facility];  Service: Pulmonary;  Laterality: Left;    BRONCHOSCOPY WITH FLUOROSCOPY N/A 11/30/2023    Procedure: BRONCHOSCOPY, WITH FLUOROSCOPY;  Surgeon: Lisa Villarreal MD;  Location: [facility];  Service: Pulmonary;  Laterality: N/A;    CARDIAC ELECTROPHYSIOLOGY MAPPING AND ABLATION  2017    SVT    CHOLECYSTECTOMY  2011    COLONOSCOPY N/A 5/3/2022    Procedure: COLONOSCOPY;  Surgeon: Shan Jean III, MD;  Location: [facility];  Service: Endoscopy;  Laterality: N/A;    COLONOSCOPY N/A  3/16/2024    Procedure: COLONOSCOPY;  Surgeon: ALEKSANDER Ramos MD;  Location: [facility];  Service: Endoscopy;  Laterality: N/A;    COLONOSCOPY N/A 1/17/2025    Procedure: COLONOSCOPY;  Surgeon: Russ Rowe MD;  Location: [facility];  Service: Endoscopy;  Laterality: N/A;    COLONOSCOPY WITH FECAL MICROBIOTA TRANSFER N/A 3/21/2023    Procedure: COLONOSCOPY, WITH FECAL MICROBIOTA TRANSFER;  Surgeon: David Millan MD;  Location: [facility];  Service: Endoscopy;  Laterality: N/A;    ESOPHAGOGASTRODUODENOSCOPY N/A 4/24/2023    Procedure: EGD (ESOPHAGOGASTRODUODENOSCOPY);  Surgeon: Shan Jean III, MD;  Location: [facility];  Service: Endoscopy;  Laterality: N/A;    ESOPHAGOGASTRODUODENOSCOPY N/A 6/5/2024    Procedure: EGD (ESOPHAGOGASTRODUODENOSCOPY);  Surgeon: Odalis Mccabe MD;  Location: [facility];  Service: Endoscopy;  Laterality: N/A;    FLEXIBLE SIGMOIDOSCOPY N/A 6/5/2024    Procedure: SIGMOIDOSCOPY, FLEXIBLE;  Surgeon: Odalis Mccabe MD;  Location: [facility];  Service: Endoscopy;  Laterality: N/A;    FLEXIBLE SIGMOIDOSCOPY N/A 7/16/2024    Procedure: SIGMOIDOSCOPY, FLEXIBLE;  Surgeon: Shan Jean III, MD;  Location: [facility];  Service: Endoscopy;  Laterality: N/A;    FLEXIBLE SIGMOIDOSCOPY N/A 8/13/2024    Procedure: SIGMOIDOSCOPY, FLEXIBLE;  Surgeon: Shan Jean III, MD;  Location: [facility];  Service: Endoscopy;  Laterality: N/A;    GANGLION CYST EXCISION Left 1992    UMBILICAL HERNIA REPAIR  2011    with mesh     Family History   Problem Relation Name Age of Onset    Asthma Mother       Social History[1]  Review of Systems   Constitutional:  Positive for appetite change. Negative for chills, diaphoresis, fatigue and fever.   HENT:  Negative for congestion and rhinorrhea.    Respiratory:  Negative for cough and shortness of breath.    Cardiovascular:  Negative for chest pain.   Gastrointestinal:  Positive for diarrhea, nausea and vomiting. Negative for abdominal  pain.   Genitourinary:  Negative for dysuria, frequency and testicular pain.   Musculoskeletal:  Negative for gait problem.   Skin:  Negative for color change.   Neurological:  Negative for dizziness and numbness.   Psychiatric/Behavioral:  Negative for agitation and confusion.        Physical Exam     Initial Vitals [02/19/25 1236]   BP Pulse Resp Temp SpO2   122/75 83 18 97.9 °F (36.6 °C) 99 %      MAP       --         Physical Exam    Nursing note and vitals reviewed.  Constitutional: He appears well-developed and well-nourished.   HENT:   Head: Normocephalic and atraumatic.   Dry mucous membranes noted.   Eyes: EOM are normal. Pupils are equal, round, and reactive to light.   Neck: Neck supple.   Cardiovascular:  Normal rate and regular rhythm.           Pulmonary/Chest: Breath sounds normal.   Abdominal: Abdomen is soft. Bowel sounds are normal. He exhibits no distension. There is no rebound and no guarding.   Musculoskeletal:         General: Normal range of motion.      Cervical back: Neck supple.     Neurological: He is alert and oriented to person, place, and time.   Skin: Skin is warm and dry.   Psychiatric: He has a normal mood and affect.         ED Course   Procedures  Labs Reviewed   COMPREHENSIVE METABOLIC PANEL - Abnormal       Result Value    Sodium 142      Potassium 4.1      Chloride 108      CO2 30 (*)     Glucose 113 (*)     BUN 10      Creatinine 1.2      Calcium 8.0 (*)     Total Protein 5.2 (*)     Albumin 2.9 (*)     Total Bilirubin 0.3      Alkaline Phosphatase 47 (*)     AST 26      ALT 23      eGFR >60.0      Anion Gap 4 (*)    CBC W/ AUTO DIFFERENTIAL - Abnormal    WBC 8.35      RBC 3.01 (*)     Hemoglobin 8.2 (*)     Hematocrit 27.7 (*)     MCV 92      MCH 27.2      MCHC 29.6 (*)     RDW 20.3 (*)     Platelets 202      MPV 10.3      Immature Granulocytes 0.7 (*)     Gran # (ANC) 5.5      Immature Grans (Abs) 0.06 (*)     Lymph # 2.1      Mono # 0.7      Eos # 0.1      Baso # 0.02       nRBC 0      Gran % 65.6      Lymph % 25.1      Mono % 7.8      Eosinophil % 0.6      Basophil % 0.2      Differential Method Automated     MAGNESIUM - Abnormal    Magnesium 1.5 (*)    LIPASE    Lipase 13            Imaging Results    None          Medications   lactated ringers bolus 1,000 mL (1,000 mLs Intravenous New Bag 2/19/25 1407)   sodium chloride 0.9% bolus 1,000 mL 1,000 mL (has no administration in time range)     Medical Decision Making  50-year-old male presents for dizziness/lightheadedness.    Initial differential diagnosis included but not limited to acute kidney injury, dehydration, and viral illness.    Amount and/or Complexity of Data Reviewed  Labs: ordered.    Risk  Risk Details: The patient was emergently evaluated in the emergency department, his evaluation was significant for a middle-age male with dry mucous membranes noted.  The patient's labs were significant for dehydration as well as mildly worsening of his chronic anemia.  The patient was treated here with IV fluids.  The patient is stable for discharge to home.  He is instructed to increase his p.o. water intake at home.  The patient was offered antiemetics at home, but reports that he already has Phenergan at home.  He is referred to primary care for follow-up.                                      Clinical Impression:  Final diagnoses:  [E86.0] Dehydration (Primary)          ED Disposition Condition    Discharge Stable          ED Prescriptions    None       Follow-up Information       Follow up With Specialties Details Why Contact Info    Hunter Aguilar III, MD Family Medicine Schedule an appointment as soon as possible for a visit   [address] [place] [address]  [address] [place] [id number]  [phone]                   [1]   Social History  Tobacco Use    Smoking status: Former     Average packs/day: 1 pack/day for 30.0 years (30.0 ttl pk-yrs)     Types: Cigarettes     Start date: 1993    Smokeless tobacco: Never    Tobacco comments:     Pt  states he has stopped smoking with Chantix three weeks ago. 12/1/23   Substance Use Topics    Alcohol use: Yes     Comment: ocass    Drug use: Not Currently        Fahad Rodríguez MD  02/19/25 1614

## 2025-02-19 NOTE — PROGRESS NOTES
C3 nurse attempted to contact Jacoby Jean-Baptiste  for a TCC post hospital discharge follow up call. The patient has a scheduled HOSFU appointment with Hnuter Aguilar III, MD on 02/25/25 @ 0962.       Pt advised in route to ED. Will continue to monitor progress

## 2025-02-20 LAB
IGA SERPL-MCNC: 251 MG/DL (ref 90–386)
IGG SERPL-MCNC: 884 MG/DL (ref 603–1613)

## 2025-02-21 LAB
BACTERIA BLD CULT: NORMAL
TRYPTASE SERPL-MCNC: 6 UG/L (ref 2.2–13.2)

## 2025-02-25 ENCOUNTER — DOCUMENT SCAN (OUTPATIENT)
Dept: HOME HEALTH SERVICES | Facility: HOSPITAL | Age: 50
End: 2025-02-25
Payer: COMMERCIAL

## 2025-02-25 ENCOUNTER — HOSPITAL ENCOUNTER (EMERGENCY)
Facility: HOSPITAL | Age: 50
Discharge: HOME | End: 2025-02-25
Attending: STUDENT IN AN ORGANIZED HEALTH CARE EDUCATION/TRAINING PROGRAM
Payer: COMMERCIAL

## 2025-02-25 VITALS
SYSTOLIC BLOOD PRESSURE: 159 MMHG | BODY MASS INDEX: 46.93 KG/M2 | DIASTOLIC BLOOD PRESSURE: 93 MMHG | OXYGEN SATURATION: 100 % | TEMPERATURE: 99 F | HEIGHT: 66 IN | WEIGHT: 292 LBS | RESPIRATION RATE: 20 BRPM | HEART RATE: 110 BPM

## 2025-02-25 DIAGNOSIS — R06.02 SOB (SHORTNESS OF BREATH): ICD-10-CM

## 2025-02-25 DIAGNOSIS — R05.1 ACUTE COUGH: Primary | ICD-10-CM

## 2025-02-25 LAB
INFLUENZA A, MOLECULAR: NEGATIVE
INFLUENZA B, MOLECULAR: NEGATIVE
SARS-COV-2 RDRP RESP QL NAA+PROBE: NEGATIVE
SPECIMEN SOURCE: NORMAL

## 2025-02-25 PROCEDURE — 87502 INFLUENZA DNA AMP PROBE: CPT | Performed by: NURSE PRACTITIONER

## 2025-02-25 PROCEDURE — 87635 SARS-COV-2 COVID-19 AMP PRB: CPT | Performed by: NURSE PRACTITIONER

## 2025-02-25 PROCEDURE — 99284 EMERGENCY DEPT VISIT MOD MDM: CPT | Mod: 25

## 2025-02-25 PROCEDURE — 93005 ELECTROCARDIOGRAM TRACING: CPT | Performed by: INTERNAL MEDICINE

## 2025-02-26 NOTE — FIRST PROVIDER EVALUATION
Emergency Department TeleTriage Encounter Note      CHIEF COMPLAINT    Chief Complaint   Patient presents with    Cough     Cough started Sunday.  Body aches and SOB started yesterday.         VITAL SIGNS   Initial Vitals [02/25/25 1813]   BP Pulse Resp Temp SpO2   (!) 159/93 110 20 99.1 °F (37.3 °C) 100 %      MAP       --            ALLERGIES    Review of patient's allergies indicates:  No Known Allergies    PROVIDER TRIAGE NOTE  Verbal consent for the teletriage evaluation was given by the patient at the start of the evaluation.  All efforts will be made to maintain patient's privacy during the evaluation.      This is a teletriage evaluation of a 50 y.o. male presenting to the ED with c/o body aches, SOB, cough for 2 days. Limited physical exam via telehealth: The patient is awake, alert, answering questions appropriately and is not in respiratory distress.  As the Teletriage provider, I performed an initial assessment and ordered appropriate labs and imaging studies, if any, to facilitate the patient's care once placed in the ED. Once a room is available, care and a full evaluation will be completed by an alternate ED provider.  Any additional orders and the final disposition will be determined by that provider.  All imaging and labs will not be followed-up by the Teletriage Team, including myself.          ORDERS  Labs Reviewed   SARS-COV-2 RNA AMPLIFICATION, QUAL   INFLUENZA A AND B ANTIGEN       ED Orders (720h ago, onward)      Start Ordered     Status Ordering Provider    02/25/25 1826 02/25/25 1826  COVID-19 Rapid Screening  STAT         Ordered STEVE CORCORAN    02/25/25 1826 02/25/25 1826  Influenza antigen Nasopharyngeal Swab  Once         Ordered STEVE CORCORAN    02/25/25 1826 02/25/25 1826  EKG 12-lead  Once         Ordered STEVE CORCORAN    02/25/25 1826 02/25/25 1826  X-Ray Chest PA And Lateral  1 time imaging         Ordered STEVE CORCORAN              Virtual Visit Note: The provider triage portion  of this emergency department evaluation and documentation was performed via [employer], a HIPAA-compliant telemedicine application, in concert with a tele-presenter in the room. A face to face patient evaluation with one of my colleagues will occur once the patient is placed in an emergency department room.      DISCLAIMER: This note was prepared with [employer] voice recognition transcription software. Garbled syntax, mangled pronouns, and other bizarre constructions may be attributed to that software system.

## 2025-02-26 NOTE — ED PROVIDER NOTES
Encounter Date: 2/25/2025       History     Chief Complaint   Patient presents with    Cough     Cough started Sunday.  Body aches and SOB started yesterday.       50-year-old male presents for evaluation of cough and aches ongoing for the last few days, some shortness a breath.  History of ulcerative colitis.  No current chest pain.  Patient reports recent cardiac workup within normal limits    The history is provided by the patient.     Review of patient's allergies indicates:  No Known Allergies  Past Medical History:   Diagnosis Date    C. difficile colitis     Cavitary pneumonia 11/2023    pos aspergillus serology    Diabetes mellitus 01/2022    Hyperlipidemia 2015    Hypertension 2015    Insomnia 2015    Ulcerative colitis      Past Surgical History:   Procedure Laterality Date    BRONCHOSCOPY WITH FLUOROSCOPY Left 11/20/2023    Procedure: BRONCHOSCOPY, WITH FLUOROSCOPY;  Surgeon: Lisa Villarreal MD;  Location: [facility];  Service: Pulmonary;  Laterality: Left;    BRONCHOSCOPY WITH FLUOROSCOPY N/A 11/30/2023    Procedure: BRONCHOSCOPY, WITH FLUOROSCOPY;  Surgeon: Lisa Villarreal MD;  Location: [facility];  Service: Pulmonary;  Laterality: N/A;    CARDIAC ELECTROPHYSIOLOGY MAPPING AND ABLATION  2017    SVT    CHOLECYSTECTOMY  2011    COLONOSCOPY N/A 5/3/2022    Procedure: COLONOSCOPY;  Surgeon: Shan Jean III, MD;  Location: [facility];  Service: Endoscopy;  Laterality: N/A;    COLONOSCOPY N/A 3/16/2024    Procedure: COLONOSCOPY;  Surgeon: ALEKSANDER Ramos MD;  Location: [facility];  Service: Endoscopy;  Laterality: N/A;    COLONOSCOPY N/A 1/17/2025    Procedure: COLONOSCOPY;  Surgeon: Russ Rowe MD;  Location: [facility];  Service: Endoscopy;  Laterality: N/A;    COLONOSCOPY WITH FECAL MICROBIOTA TRANSFER N/A 3/21/2023    Procedure: COLONOSCOPY, WITH FECAL MICROBIOTA TRANSFER;  Surgeon: David Millan MD;  [facility];  Service: Endoscopy;  Laterality: N/A;     ESOPHAGOGASTRODUODENOSCOPY N/A 4/24/2023    Procedure: EGD (ESOPHAGOGASTRODUODENOSCOPY);  Surgeon: Shan Jean III, MD;  Location: [facility];  Service: Endoscopy;  Laterality: N/A;    ESOPHAGOGASTRODUODENOSCOPY N/A 6/5/2024    Procedure: EGD (ESOPHAGOGASTRODUODENOSCOPY);  Surgeon: Odalis Mccabe MD;  Location: [facility];  Service: Endoscopy;  Laterality: N/A;    FLEXIBLE SIGMOIDOSCOPY N/A 6/5/2024    Procedure: SIGMOIDOSCOPY, FLEXIBLE;  Surgeon: Odalis Mccabe MD;  Location: [facility];  Service: Endoscopy;  Laterality: N/A;    FLEXIBLE SIGMOIDOSCOPY N/A 7/16/2024    Procedure: SIGMOIDOSCOPY, FLEXIBLE;  Surgeon: Shan Jean III, MD;  Location: [facility];  Service: Endoscopy;  Laterality: N/A;    FLEXIBLE SIGMOIDOSCOPY N/A 8/13/2024    Procedure: SIGMOIDOSCOPY, FLEXIBLE;  Surgeon: Shna Jean III, MD;  Location: [facility];  Service: Endoscopy;  Laterality: N/A;    GANGLION CYST EXCISION Left 1992    UMBILICAL HERNIA REPAIR  2011    with mesh     Family History   Problem Relation Name Age of Onset    Asthma Mother       Social History[1]  Review of Systems   All other systems reviewed and are negative.      Physical Exam     Initial Vitals [02/25/25 1813]   BP Pulse Resp Temp SpO2   (!) 159/93 110 20 99.1 °F (37.3 °C) 100 %      MAP       --         Physical Exam    Nursing note and vitals reviewed.  Constitutional: Vital signs are normal.  Non-toxic appearance. No distress.   HENT:   Head: Normocephalic.   Eyes: No scleral icterus.   Pulmonary/Chest: No stridor. No respiratory distress.   Breath sounds bilaterally with no hypoxemia Bilateral chest rise   Abdominal: There is no guarding.   Musculoskeletal:         General: No tenderness.      Cervical back: No rigidity.     Neurological: He is alert.   Skin: Skin is warm and dry. No rash noted.   Psychiatric: His speech is normal. He is not actively hallucinating.   Not anxious  or agitated         ED Course   Procedures  Labs Reviewed    SARS-COV-2 RNA AMPLIFICATION, QUAL       Result Value    SARS-CoV-2 RNA, Amplification, Qual Negative     INFLUENZA A AND B ANTIGEN    Influenza A, Molecular Negative      Influenza B, Molecular Negative      Flu A & B Source Nasal swab      Narrative:     Specimen Source->Nasopharyngeal Swab          Imaging Results              X-Ray Chest PA And Lateral (In process)                      Medications - No data to display  Medical Decision Making  Workup initiated by triage provider, chest x-ray obtained with no obvious focal infiltrate per my interpretation, formal read pending.  Viral tested negative.  Discussed treatment with oral antibiotics given immunosuppression and patient declined given history of C diff. discussed workup with blood work to evaluate for H and H given history of anemia as as cardiac markers and patient declined.  Patient request discharge at this point.  Discharged home with supportive care, close PCP follow up and strict return precautions for any worsening symptoms.    Amount and/or Complexity of Data Reviewed  Labs:  Decision-making details documented in ED Course.  ECG/medicine tests: ordered and independent interpretation performed.     Details: EKG rate 110, QRS 68, , normal sinus rhythm, no STEMI               ED Course as of 02/25/25 1957 Tue Feb 25, 2025 1947 Influenza A, Molecular: Negative [KB]   1947 Influenza B, Molecular: Negative [KB]   1947 SARS-CoV-2 RNA, Amplification, Qual: Negative [KB]      ED Course User Index  [KB] Andre Romero Jr., DO            EKG rate 110, QRS 68, , normal sinus rhythm, no STEMI                   Clinical Impression:  Final diagnoses:  [R06.02] SOB (shortness of breath)  [R05.1] Acute cough (Primary)          ED Disposition Condition    Discharge Stable          ED Prescriptions    None       Follow-up Information       Follow up With Specialties Details Why Contact Info    Hunter Aguilar III, MD Family Medicine In 1  week  [address]  [address]  [facility] [address]  [phone]                 [1]   Social History  Tobacco Use    Smoking status: Former     Average packs/day: 1 pack/day for 30.0 years (30.0 ttl pk-yrs)     Types: Cigarettes     Start date: 1993    Smokeless tobacco: Never    Tobacco comments:     Pt states he has stopped smoking with Chantix three weeks ago. 12/1/23   Substance Use Topics    Alcohol use: Yes     Comment: ocass    Drug use: Not Currently        Andre Romero Jr., DO  02/25/25 1957

## 2025-02-28 ENCOUNTER — HOSPITAL ENCOUNTER (INPATIENT)
Facility: HOSPITAL | Age: 50
LOS: 1 days | Discharge: HOME | DRG: 386 | End: 2025-03-02
Attending: EMERGENCY MEDICINE | Admitting: INTERNAL MEDICINE
Payer: COMMERCIAL
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
## 2025-02-28 DIAGNOSIS — K51.90 ULCERATIVE COLITIS WITHOUT COMPLICATIONS, UNSPECIFIED LOCATION: Primary | ICD-10-CM

## 2025-02-28 DIAGNOSIS — K51.90 ULCERATIVE COLITIS: ICD-10-CM

## 2025-02-28 DIAGNOSIS — S22.088A OTHER CLOSED FRACTURE OF TWELFTH THORACIC VERTEBRA, INITIAL ENCOUNTER: ICD-10-CM

## 2025-02-28 DIAGNOSIS — R10.84 GENERALIZED ABDOMINAL PAIN: ICD-10-CM

## 2025-02-28 DIAGNOSIS — K92.1 BLOOD IN STOOL: ICD-10-CM

## 2025-02-28 DIAGNOSIS — R07.9 CHEST PAIN: ICD-10-CM
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
## 2025-02-28 PROBLEM — E83.42 HYPOMAGNESEMIA: Status: ACTIVE | Noted: 2025-02-28

## 2025-02-28 LAB
ALBUMIN SERPL BCP-MCNC: 3.5 G/DL (ref 3.5–5.2)
ALP SERPL-CCNC: 72 U/L (ref 55–135)
ALT SERPL W/O P-5'-P-CCNC: 18 U/L (ref 10–44)
ANION GAP SERPL CALC-SCNC: 7 MMOL/L (ref 8–16)
AST SERPL-CCNC: 17 U/L (ref 10–40)
BASOPHILS # BLD AUTO: 0.05 K/UL (ref 0–0.2)
BASOPHILS NFR BLD: 0.4 % (ref 0–1.9)
BILIRUB SERPL-MCNC: 0.5 MG/DL (ref 0.1–1)
BILIRUB UR QL STRIP: NEGATIVE
BNP SERPL-MCNC: 56 PG/ML (ref 0–99)
BUN SERPL-MCNC: 10 MG/DL (ref 6–20)
CALCIUM SERPL-MCNC: 8.8 MG/DL (ref 8.7–10.5)
CHLORIDE SERPL-SCNC: 106 MMOL/L (ref 95–110)
CLARITY UR: CLEAR
CO2 SERPL-SCNC: 24 MMOL/L (ref 23–29)
COLOR UR: YELLOW
CREAT SERPL-MCNC: 1 MG/DL (ref 0.5–1.4)
CREAT SERPL-MCNC: 1 MG/DL (ref 0.5–1.4)
DIFFERENTIAL METHOD BLD: ABNORMAL
EOSINOPHIL # BLD AUTO: 0.1 K/UL (ref 0–0.5)
EOSINOPHIL NFR BLD: 0.7 % (ref 0–8)
ERYTHROCYTE [DISTWIDTH] IN BLOOD BY AUTOMATED COUNT: 21.1 % (ref 11.5–14.5)
EST. GFR  (NO RACE VARIABLE): >60 ML/MIN/1.73 M^2
GLUCOSE SERPL-MCNC: 73 MG/DL (ref 70–110)
GLUCOSE UR QL STRIP: NEGATIVE
HCT VFR BLD AUTO: 33.6 % (ref 40–54)
HGB BLD-MCNC: 10.4 G/DL (ref 14–18)
HGB UR QL STRIP: NEGATIVE
IMM GRANULOCYTES # BLD AUTO: 0.15 K/UL (ref 0–0.04)
IMM GRANULOCYTES NFR BLD AUTO: 1.2 % (ref 0–0.5)
KETONES UR QL STRIP: NEGATIVE
LEUKOCYTE ESTERASE UR QL STRIP: NEGATIVE
LIPASE SERPL-CCNC: 9 U/L (ref 4–60)
LYMPHOCYTES # BLD AUTO: 0.9 K/UL (ref 1–4.8)
LYMPHOCYTES NFR BLD: 6.8 % (ref 18–48)
MAGNESIUM SERPL-MCNC: 1.5 MG/DL (ref 1.6–2.6)
MCH RBC QN AUTO: 28.2 PG (ref 27–31)
MCHC RBC AUTO-ENTMCNC: 31 G/DL (ref 32–36)
MCV RBC AUTO: 91 FL (ref 82–98)
MONOCYTES # BLD AUTO: 1.3 K/UL (ref 0.3–1)
MONOCYTES NFR BLD: 9.8 % (ref 4–15)
NEUTROPHILS # BLD AUTO: 10.5 K/UL (ref 1.8–7.7)
NEUTROPHILS NFR BLD: 81.1 % (ref 38–73)
NITRITE UR QL STRIP: NEGATIVE
NRBC BLD-RTO: 0 /100 WBC
PH UR STRIP: 8 [PH] (ref 5–8)
PLATELET # BLD AUTO: 184 K/UL (ref 150–450)
PMV BLD AUTO: 10.8 FL (ref 9.2–12.9)
POTASSIUM SERPL-SCNC: 5 MMOL/L (ref 3.5–5.1)
PROT SERPL-MCNC: 6.2 G/DL (ref 6–8.4)
PROT UR QL STRIP: NEGATIVE
RBC # BLD AUTO: 3.69 M/UL (ref 4.6–6.2)
SAMPLE: NORMAL
SODIUM SERPL-SCNC: 137 MMOL/L (ref 136–145)
SP GR UR STRIP: >1.03 (ref 1–1.03)
TROPONIN I SERPL HS-MCNC: 4.4 PG/ML (ref 0–14.9)
URN SPEC COLLECT METH UR: ABNORMAL
UROBILINOGEN UR STRIP-ACNC: NEGATIVE EU/DL
WBC # BLD AUTO: 12.94 K/UL (ref 3.9–12.7)

## 2025-02-28 PROCEDURE — 63600175 PHARM REV CODE 636 W HCPCS

## 2025-02-28 PROCEDURE — 85025 COMPLETE CBC W/AUTO DIFF WBC: CPT

## 2025-02-28 PROCEDURE — G0378 HOSPITAL OBSERVATION PER HR: HCPCS

## 2025-02-28 PROCEDURE — 25000003 PHARM REV CODE 250

## 2025-02-28 PROCEDURE — 82565 ASSAY OF CREATININE: CPT

## 2025-02-28 PROCEDURE — 25500020 PHARM REV CODE 255: Performed by: EMERGENCY MEDICINE

## 2025-02-28 PROCEDURE — 84484 ASSAY OF TROPONIN QUANT: CPT

## 2025-02-28 PROCEDURE — 83690 ASSAY OF LIPASE: CPT

## 2025-02-28 PROCEDURE — 36415 COLL VENOUS BLD VENIPUNCTURE: CPT

## 2025-02-28 PROCEDURE — 83605 ASSAY OF LACTIC ACID: CPT

## 2025-02-28 PROCEDURE — 85730 THROMBOPLASTIN TIME PARTIAL: CPT

## 2025-02-28 PROCEDURE — 96376 TX/PRO/DX INJ SAME DRUG ADON: CPT

## 2025-02-28 PROCEDURE — 96361 HYDRATE IV INFUSION ADD-ON: CPT

## 2025-02-28 PROCEDURE — 93005 ELECTROCARDIOGRAM TRACING: CPT | Performed by: GENERAL PRACTICE

## 2025-02-28 PROCEDURE — 83880 ASSAY OF NATRIURETIC PEPTIDE: CPT

## 2025-02-28 PROCEDURE — 80053 COMPREHEN METABOLIC PANEL: CPT

## 2025-02-28 PROCEDURE — 87040 BLOOD CULTURE FOR BACTERIA: CPT

## 2025-02-28 PROCEDURE — 99285 EMERGENCY DEPT VISIT HI MDM: CPT | Mod: 25

## 2025-02-28 PROCEDURE — 83735 ASSAY OF MAGNESIUM: CPT

## 2025-02-28 PROCEDURE — 81003 URINALYSIS AUTO W/O SCOPE: CPT

## 2025-02-28 PROCEDURE — 96365 THER/PROPH/DIAG IV INF INIT: CPT

## 2025-02-28 PROCEDURE — 96375 TX/PRO/DX INJ NEW DRUG ADDON: CPT

## 2025-02-28 RX ORDER — ESCITALOPRAM OXALATE 10 MG/1
20 TABLET ORAL DAILY
Status: DISCONTINUED | OUTPATIENT
Start: 2025-03-01 | End: 2025-03-02 | Stop reason: HOSPADM

## 2025-02-28 RX ORDER — MORPHINE SULFATE 4 MG/ML
4 INJECTION, SOLUTION INTRAMUSCULAR; INTRAVENOUS
Status: COMPLETED | OUTPATIENT
Start: 2025-02-28 | End: 2025-02-28

## 2025-02-28 RX ORDER — DIAZEPAM 5 MG/1
5 TABLET ORAL DAILY PRN
Status: DISCONTINUED | OUTPATIENT
Start: 2025-02-28 | End: 2025-03-02 | Stop reason: HOSPADM

## 2025-02-28 RX ORDER — ONDANSETRON HYDROCHLORIDE 2 MG/ML
4 INJECTION, SOLUTION INTRAVENOUS
Status: COMPLETED | OUTPATIENT
Start: 2025-02-28 | End: 2025-02-28

## 2025-02-28 RX ORDER — BUSPIRONE HYDROCHLORIDE 5 MG/1
15 TABLET ORAL 3 TIMES DAILY
Status: DISCONTINUED | OUTPATIENT
Start: 2025-02-28 | End: 2025-03-02 | Stop reason: HOSPADM

## 2025-02-28 RX ORDER — SODIUM CHLORIDE 9 MG/ML
INJECTION, SOLUTION INTRAVENOUS CONTINUOUS
Status: DISCONTINUED | OUTPATIENT
Start: 2025-02-28 | End: 2025-03-01

## 2025-02-28 RX ORDER — ATORVASTATIN CALCIUM 10 MG/1
10 TABLET, FILM COATED ORAL DAILY
Status: DISCONTINUED | OUTPATIENT
Start: 2025-03-01 | End: 2025-03-02 | Stop reason: HOSPADM

## 2025-02-28 RX ORDER — MAGNESIUM SULFATE 1 G/100ML
1 INJECTION INTRAVENOUS ONCE
Status: COMPLETED | OUTPATIENT
Start: 2025-02-28 | End: 2025-02-28

## 2025-02-28 RX ORDER — PANTOPRAZOLE SODIUM 40 MG/10ML
40 INJECTION, POWDER, LYOPHILIZED, FOR SOLUTION INTRAVENOUS
Status: COMPLETED | OUTPATIENT
Start: 2025-02-28 | End: 2025-02-28

## 2025-02-28 RX ORDER — ALUMINUM HYDROXIDE, MAGNESIUM HYDROXIDE, AND SIMETHICONE 1200; 120; 1200 MG/30ML; MG/30ML; MG/30ML
30 SUSPENSION ORAL 4 TIMES DAILY PRN
Status: DISCONTINUED | OUTPATIENT
Start: 2025-02-28 | End: 2025-03-02 | Stop reason: HOSPADM

## 2025-02-28 RX ORDER — IBUPROFEN 200 MG
24 TABLET ORAL
Status: DISCONTINUED | OUTPATIENT
Start: 2025-02-28 | End: 2025-03-02 | Stop reason: HOSPADM

## 2025-02-28 RX ORDER — ERYTHROMYCIN 500 MG/1
TABLET, DELAYED RELEASE ORAL
COMMUNITY
Start: 2025-02-13 | End: 2025-02-28

## 2025-02-28 RX ORDER — NALOXONE HCL 0.4 MG/ML
0.02 VIAL (ML) INJECTION
Status: DISCONTINUED | OUTPATIENT
Start: 2025-02-28 | End: 2025-03-02 | Stop reason: HOSPADM

## 2025-02-28 RX ORDER — OXYCODONE HYDROCHLORIDE 10 MG/1
10 TABLET ORAL EVERY 6 HOURS PRN
Refills: 0 | Status: DISCONTINUED | OUTPATIENT
Start: 2025-02-28 | End: 2025-03-02 | Stop reason: HOSPADM

## 2025-02-28 RX ORDER — SODIUM CHLORIDE 0.9 % (FLUSH) 0.9 %
10 SYRINGE (ML) INJECTION EVERY 12 HOURS PRN
Status: DISCONTINUED | OUTPATIENT
Start: 2025-02-28 | End: 2025-03-02 | Stop reason: HOSPADM

## 2025-02-28 RX ORDER — TALC
6 POWDER (GRAM) TOPICAL NIGHTLY PRN
Status: DISCONTINUED | OUTPATIENT
Start: 2025-02-28 | End: 2025-03-02 | Stop reason: HOSPADM

## 2025-02-28 RX ORDER — METHYLPREDNISOLONE SOD SUCC 125 MG
125 VIAL (EA) INJECTION
Status: COMPLETED | OUTPATIENT
Start: 2025-02-28 | End: 2025-02-28

## 2025-02-28 RX ORDER — IBUPROFEN 200 MG
16 TABLET ORAL
Status: DISCONTINUED | OUTPATIENT
Start: 2025-02-28 | End: 2025-03-02 | Stop reason: HOSPADM

## 2025-02-28 RX ORDER — METOPROLOL SUCCINATE 50 MG/1
50 TABLET, EXTENDED RELEASE ORAL DAILY
Status: DISCONTINUED | OUTPATIENT
Start: 2025-03-01 | End: 2025-03-02 | Stop reason: HOSPADM

## 2025-02-28 RX ORDER — ACETAMINOPHEN 325 MG/1
650 TABLET ORAL EVERY 4 HOURS PRN
Status: DISCONTINUED | OUTPATIENT
Start: 2025-02-28 | End: 2025-03-02 | Stop reason: HOSPADM

## 2025-02-28 RX ORDER — INSULIN ASPART 100 [IU]/ML
0-5 INJECTION, SOLUTION INTRAVENOUS; SUBCUTANEOUS
Status: DISCONTINUED | OUTPATIENT
Start: 2025-02-28 | End: 2025-03-02 | Stop reason: HOSPADM

## 2025-02-28 RX ORDER — PREGABALIN 25 MG/1
50 CAPSULE ORAL 2 TIMES DAILY
Status: DISCONTINUED | OUTPATIENT
Start: 2025-02-28 | End: 2025-03-02 | Stop reason: HOSPADM

## 2025-02-28 RX ORDER — MORPHINE SULFATE 4 MG/ML
4 INJECTION, SOLUTION INTRAMUSCULAR; INTRAVENOUS EVERY 4 HOURS PRN
Refills: 0 | Status: DISCONTINUED | OUTPATIENT
Start: 2025-02-28 | End: 2025-03-02 | Stop reason: HOSPADM

## 2025-02-28 RX ORDER — DIPHENHYDRAMINE HCL 25 MG
25 CAPSULE ORAL EVERY 6 HOURS PRN
Status: DISCONTINUED | OUTPATIENT
Start: 2025-02-28 | End: 2025-03-02 | Stop reason: HOSPADM

## 2025-02-28 RX ORDER — ONDANSETRON HYDROCHLORIDE 2 MG/ML
4 INJECTION, SOLUTION INTRAVENOUS EVERY 6 HOURS PRN
Status: DISCONTINUED | OUTPATIENT
Start: 2025-02-28 | End: 2025-03-02 | Stop reason: HOSPADM

## 2025-02-28 RX ORDER — GLUCAGON 1 MG
1 KIT INJECTION
Status: DISCONTINUED | OUTPATIENT
Start: 2025-02-28 | End: 2025-03-02 | Stop reason: HOSPADM

## 2025-02-28 RX ORDER — METHOCARBAMOL 500 MG/1
500 TABLET, FILM COATED ORAL 4 TIMES DAILY
Status: DISCONTINUED | OUTPATIENT
Start: 2025-02-28 | End: 2025-03-02 | Stop reason: HOSPADM

## 2025-02-28 RX ORDER — BACITRACIN 500 [USP'U]/G
OINTMENT TOPICAL
Status: COMPLETED | OUTPATIENT
Start: 2025-02-28 | End: 2025-02-28

## 2025-02-28 RX ORDER — OXYCODONE AND ACETAMINOPHEN 5; 325 MG/1; MG/1
1 TABLET ORAL
Refills: 0 | Status: COMPLETED | OUTPATIENT
Start: 2025-02-28 | End: 2025-02-28

## 2025-02-28 RX ORDER — PANTOPRAZOLE SODIUM 40 MG/10ML
40 INJECTION, POWDER, LYOPHILIZED, FOR SOLUTION INTRAVENOUS 2 TIMES DAILY
Status: DISCONTINUED | OUTPATIENT
Start: 2025-03-01 | End: 2025-03-02 | Stop reason: HOSPADM

## 2025-02-28 RX ADMIN — BUSPIRONE HYDROCHLORIDE 15 MG: 5 TABLET ORAL at 08:02

## 2025-02-28 RX ADMIN — BACITRACIN: 500 OINTMENT TOPICAL at 08:02

## 2025-02-28 RX ADMIN — OXYCODONE HYDROCHLORIDE AND ACETAMINOPHEN 1 TABLET: 5; 325 TABLET ORAL at 01:02

## 2025-02-28 RX ADMIN — METHYLPREDNISOLONE SODIUM SUCCINATE 125 MG: 125 INJECTION, POWDER, FOR SOLUTION INTRAMUSCULAR; INTRAVENOUS at 03:02

## 2025-02-28 RX ADMIN — MAGNESIUM SULFATE IN DEXTROSE 1 G: 10 INJECTION, SOLUTION INTRAVENOUS at 08:02

## 2025-02-28 RX ADMIN — PANTOPRAZOLE SODIUM 40 MG: 40 INJECTION, POWDER, FOR SOLUTION INTRAVENOUS at 03:02

## 2025-02-28 RX ADMIN — ONDANSETRON 4 MG: 2 INJECTION INTRAMUSCULAR; INTRAVENOUS at 03:02

## 2025-02-28 RX ADMIN — MORPHINE SULFATE 4 MG: 4 INJECTION, SOLUTION INTRAMUSCULAR; INTRAVENOUS at 08:02

## 2025-02-28 RX ADMIN — MORPHINE SULFATE 4 MG: 4 INJECTION INTRAVENOUS at 03:02

## 2025-02-28 RX ADMIN — IOHEXOL 100 ML: 350 INJECTION, SOLUTION INTRAVENOUS at 03:02

## 2025-02-28 RX ADMIN — PREGABALIN 50 MG: 25 CAPSULE ORAL at 08:02

## 2025-02-28 RX ADMIN — SODIUM CHLORIDE, POTASSIUM CHLORIDE, SODIUM LACTATE AND CALCIUM CHLORIDE 500 ML: 600; 310; 30; 20 INJECTION, SOLUTION INTRAVENOUS at 03:02

## 2025-02-28 RX ADMIN — DIAZEPAM 5 MG: 5 TABLET ORAL at 08:02

## 2025-02-28 RX ADMIN — SODIUM CHLORIDE: 9 INJECTION, SOLUTION INTRAVENOUS at 09:02

## 2025-02-28 RX ADMIN — METHOCARBAMOL 500 MG: 500 TABLET ORAL at 08:02
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
## 2025-03-01 PROBLEM — E87.5 HYPERKALEMIA: Status: ACTIVE | Noted: 2025-03-01

## 2025-03-01 LAB
ALBUMIN SERPL BCP-MCNC: 3.6 G/DL (ref 3.5–5.2)
ALP SERPL-CCNC: 75 U/L (ref 55–135)
ALT SERPL W/O P-5'-P-CCNC: 17 U/L (ref 10–44)
ANION GAP SERPL CALC-SCNC: 4 MMOL/L (ref 8–16)
ANION GAP SERPL CALC-SCNC: 6 MMOL/L (ref 8–16)
ANION GAP SERPL CALC-SCNC: 8 MMOL/L (ref 8–16)
APTT PPP: 25.6 SEC (ref 21–32)
AST SERPL-CCNC: 14 U/L (ref 10–40)
BASOPHILS # BLD AUTO: 0 K/UL (ref 0–0.2)
BASOPHILS NFR BLD: 0 % (ref 0–1.9)
BILIRUB SERPL-MCNC: 0.5 MG/DL (ref 0.1–1)
BUN SERPL-MCNC: 12 MG/DL (ref 6–20)
BUN SERPL-MCNC: 12 MG/DL (ref 6–20)
BUN SERPL-MCNC: 15 MG/DL (ref 6–20)
CALCIUM SERPL-MCNC: 8.6 MG/DL (ref 8.7–10.5)
CALCIUM SERPL-MCNC: 8.7 MG/DL (ref 8.7–10.5)
CALCIUM SERPL-MCNC: 9.1 MG/DL (ref 8.7–10.5)
CHLORIDE SERPL-SCNC: 100 MMOL/L (ref 95–110)
CHLORIDE SERPL-SCNC: 101 MMOL/L (ref 95–110)
CHLORIDE SERPL-SCNC: 102 MMOL/L (ref 95–110)
CO2 SERPL-SCNC: 23 MMOL/L (ref 23–29)
CO2 SERPL-SCNC: 26 MMOL/L (ref 23–29)
CO2 SERPL-SCNC: 27 MMOL/L (ref 23–29)
CREAT SERPL-MCNC: 1 MG/DL (ref 0.5–1.4)
CREAT SERPL-MCNC: 1 MG/DL (ref 0.5–1.4)
CREAT SERPL-MCNC: 1.1 MG/DL (ref 0.5–1.4)
DIFFERENTIAL METHOD BLD: ABNORMAL
EOSINOPHIL # BLD AUTO: 0 K/UL (ref 0–0.5)
EOSINOPHIL NFR BLD: 0 % (ref 0–8)
ERYTHROCYTE [DISTWIDTH] IN BLOOD BY AUTOMATED COUNT: 20.3 % (ref 11.5–14.5)
EST. GFR  (NO RACE VARIABLE): >60 ML/MIN/1.73 M^2
GLUCOSE SERPL-MCNC: 122 MG/DL (ref 70–110)
GLUCOSE SERPL-MCNC: 124 MG/DL (ref 70–110)
GLUCOSE SERPL-MCNC: 174 MG/DL (ref 70–110)
HCT VFR BLD AUTO: 30.4 % (ref 40–54)
HGB BLD-MCNC: 9.6 G/DL (ref 14–18)
IMM GRANULOCYTES # BLD AUTO: 0.06 K/UL (ref 0–0.04)
IMM GRANULOCYTES NFR BLD AUTO: 0.8 % (ref 0–0.5)
LACTATE SERPL-SCNC: 1.9 MMOL/L (ref 0.5–1.9)
LYMPHOCYTES # BLD AUTO: 0.3 K/UL (ref 1–4.8)
LYMPHOCYTES NFR BLD: 4.5 % (ref 18–48)
MAGNESIUM SERPL-MCNC: 1.7 MG/DL (ref 1.6–2.6)
MCH RBC QN AUTO: 28.1 PG (ref 27–31)
MCHC RBC AUTO-ENTMCNC: 31.6 G/DL (ref 32–36)
MCV RBC AUTO: 89 FL (ref 82–98)
MONOCYTES # BLD AUTO: 0.1 K/UL (ref 0.3–1)
MONOCYTES NFR BLD: 1.5 % (ref 4–15)
NEUTROPHILS # BLD AUTO: 6.7 K/UL (ref 1.8–7.7)
NEUTROPHILS NFR BLD: 93.2 % (ref 38–73)
NRBC BLD-RTO: 0 /100 WBC
OHS QRS DURATION: 68 MS
OHS QTC CALCULATION: 439 MS
PLATELET # BLD AUTO: 189 K/UL (ref 150–450)
PMV BLD AUTO: 10.8 FL (ref 9.2–12.9)
POCT GLUCOSE: 118 MG/DL (ref 70–110)
POCT GLUCOSE: 137 MG/DL (ref 70–110)
POCT GLUCOSE: 138 MG/DL (ref 70–110)
POCT GLUCOSE: 169 MG/DL (ref 70–110)
POTASSIUM SERPL-SCNC: 5.2 MMOL/L (ref 3.5–5.1)
POTASSIUM SERPL-SCNC: 5.9 MMOL/L (ref 3.5–5.1)
POTASSIUM SERPL-SCNC: 6.3 MMOL/L (ref 3.5–5.1)
PROT SERPL-MCNC: 6.4 G/DL (ref 6–8.4)
RBC # BLD AUTO: 3.42 M/UL (ref 4.6–6.2)
SODIUM SERPL-SCNC: 131 MMOL/L (ref 136–145)
SODIUM SERPL-SCNC: 133 MMOL/L (ref 136–145)
SODIUM SERPL-SCNC: 133 MMOL/L (ref 136–145)
WBC # BLD AUTO: 7.15 K/UL (ref 3.9–12.7)

## 2025-03-01 PROCEDURE — 96376 TX/PRO/DX INJ SAME DRUG ADON: CPT

## 2025-03-01 PROCEDURE — 25000003 PHARM REV CODE 250: Performed by: HOSPITALIST

## 2025-03-01 PROCEDURE — 63600175 PHARM REV CODE 636 W HCPCS: Performed by: INTERNAL MEDICINE

## 2025-03-01 PROCEDURE — 96375 TX/PRO/DX INJ NEW DRUG ADDON: CPT

## 2025-03-01 PROCEDURE — 63600175 PHARM REV CODE 636 W HCPCS

## 2025-03-01 PROCEDURE — 12000002 HC ACUTE/MED SURGE SEMI-PRIVATE ROOM

## 2025-03-01 PROCEDURE — 25000003 PHARM REV CODE 250: Performed by: INTERNAL MEDICINE

## 2025-03-01 PROCEDURE — 25000003 PHARM REV CODE 250

## 2025-03-01 PROCEDURE — 27000207 HC ISOLATION

## 2025-03-01 PROCEDURE — 97161 PT EVAL LOW COMPLEX 20 MIN: CPT

## 2025-03-01 PROCEDURE — 96361 HYDRATE IV INFUSION ADD-ON: CPT

## 2025-03-01 PROCEDURE — 83735 ASSAY OF MAGNESIUM: CPT

## 2025-03-01 PROCEDURE — 85025 COMPLETE CBC W/AUTO DIFF WBC: CPT

## 2025-03-01 PROCEDURE — 80053 COMPREHEN METABOLIC PANEL: CPT

## 2025-03-01 PROCEDURE — 63600175 PHARM REV CODE 636 W HCPCS: Mod: JZ,TB

## 2025-03-01 PROCEDURE — 80048 BASIC METABOLIC PNL TOTAL CA: CPT | Mod: 91 | Performed by: INTERNAL MEDICINE

## 2025-03-01 PROCEDURE — 36415 COLL VENOUS BLD VENIPUNCTURE: CPT | Performed by: INTERNAL MEDICINE

## 2025-03-01 PROCEDURE — 97165 OT EVAL LOW COMPLEX 30 MIN: CPT

## 2025-03-01 PROCEDURE — 96367 TX/PROPH/DG ADDL SEQ IV INF: CPT

## 2025-03-01 PROCEDURE — 36415 COLL VENOUS BLD VENIPUNCTURE: CPT

## 2025-03-01 RX ORDER — CALCIUM GLUCONATE 20 MG/ML
1 INJECTION, SOLUTION INTRAVENOUS ONCE
Status: COMPLETED | OUTPATIENT
Start: 2025-03-01 | End: 2025-03-01

## 2025-03-01 RX ORDER — ZOLPIDEM TARTRATE 5 MG/1
5 TABLET ORAL NIGHTLY PRN
Status: DISCONTINUED | OUTPATIENT
Start: 2025-03-01 | End: 2025-03-02 | Stop reason: HOSPADM

## 2025-03-01 RX ADMIN — Medication 1 TABLET: at 12:03

## 2025-03-01 RX ADMIN — DIPHENHYDRAMINE HYDROCHLORIDE 25 MG: 25 CAPSULE ORAL at 11:03

## 2025-03-01 RX ADMIN — ZOLPIDEM TARTRATE 5 MG: 5 TABLET, FILM COATED ORAL at 02:03

## 2025-03-01 RX ADMIN — Medication 6 MG: at 12:03

## 2025-03-01 RX ADMIN — Medication 1 TABLET: at 09:03

## 2025-03-01 RX ADMIN — PREGABALIN 50 MG: 25 CAPSULE ORAL at 09:03

## 2025-03-01 RX ADMIN — BUSPIRONE HYDROCHLORIDE 15 MG: 5 TABLET ORAL at 03:03

## 2025-03-01 RX ADMIN — DIPHENHYDRAMINE HYDROCHLORIDE 25 MG: 25 CAPSULE ORAL at 01:03

## 2025-03-01 RX ADMIN — BUSPIRONE HYDROCHLORIDE 15 MG: 5 TABLET ORAL at 08:03

## 2025-03-01 RX ADMIN — MORPHINE SULFATE 4 MG: 4 INJECTION, SOLUTION INTRAMUSCULAR; INTRAVENOUS at 01:03

## 2025-03-01 RX ADMIN — MORPHINE SULFATE 4 MG: 4 INJECTION, SOLUTION INTRAMUSCULAR; INTRAVENOUS at 04:03

## 2025-03-01 RX ADMIN — METHYLPREDNISOLONE SODIUM SUCCINATE 40 MG: 40 INJECTION, POWDER, FOR SOLUTION INTRAMUSCULAR; INTRAVENOUS at 12:03

## 2025-03-01 RX ADMIN — ONDANSETRON 4 MG: 2 INJECTION INTRAMUSCULAR; INTRAVENOUS at 04:03

## 2025-03-01 RX ADMIN — OXYCODONE HYDROCHLORIDE 10 MG: 10 TABLET ORAL at 08:03

## 2025-03-01 RX ADMIN — METHOCARBAMOL 500 MG: 500 TABLET ORAL at 05:03

## 2025-03-01 RX ADMIN — ZOLPIDEM TARTRATE 5 MG: 5 TABLET, FILM COATED ORAL at 10:03

## 2025-03-01 RX ADMIN — OXYCODONE HYDROCHLORIDE 10 MG: 10 TABLET ORAL at 03:03

## 2025-03-01 RX ADMIN — ONDANSETRON 4 MG: 2 INJECTION INTRAMUSCULAR; INTRAVENOUS at 01:03

## 2025-03-01 RX ADMIN — Medication 6 MG: at 10:03

## 2025-03-01 RX ADMIN — SODIUM ZIRCONIUM CYCLOSILICATE 5 G: 5 POWDER, FOR SUSPENSION ORAL at 09:03

## 2025-03-01 RX ADMIN — INSULIN HUMAN 10 UNITS: 100 INJECTION, SOLUTION PARENTERAL at 08:03

## 2025-03-01 RX ADMIN — CALCIUM GLUCONATE 1 G: 20 INJECTION, SOLUTION INTRAVENOUS at 08:03

## 2025-03-01 RX ADMIN — PANTOPRAZOLE SODIUM 40 MG: 40 INJECTION, POWDER, FOR SOLUTION INTRAVENOUS at 09:03

## 2025-03-01 RX ADMIN — OXYCODONE HYDROCHLORIDE 10 MG: 10 TABLET ORAL at 01:03

## 2025-03-01 RX ADMIN — ESCITALOPRAM OXALATE 20 MG: 10 TABLET ORAL at 08:03

## 2025-03-01 RX ADMIN — MORPHINE SULFATE 4 MG: 4 INJECTION, SOLUTION INTRAMUSCULAR; INTRAVENOUS at 11:03

## 2025-03-01 RX ADMIN — DEXTROSE MONOHYDRATE 25 G: 25 INJECTION, SOLUTION INTRAVENOUS at 08:03

## 2025-03-01 RX ADMIN — ATORVASTATIN CALCIUM 10 MG: 10 TABLET, FILM COATED ORAL at 08:03

## 2025-03-01 RX ADMIN — MORPHINE SULFATE 4 MG: 4 INJECTION, SOLUTION INTRAMUSCULAR; INTRAVENOUS at 12:03

## 2025-03-01 RX ADMIN — METHOCARBAMOL 500 MG: 500 TABLET ORAL at 08:03

## 2025-03-01 RX ADMIN — METOPROLOL SUCCINATE 50 MG: 50 TABLET, FILM COATED, EXTENDED RELEASE ORAL at 08:03

## 2025-03-01 RX ADMIN — Medication 1 TABLET: at 08:03

## 2025-03-01 RX ADMIN — BUSPIRONE HYDROCHLORIDE 15 MG: 5 TABLET ORAL at 09:03

## 2025-03-01 RX ADMIN — RIVAROXABAN 20 MG: 20 TABLET, FILM COATED ORAL at 05:03

## 2025-03-01 RX ADMIN — METHOCARBAMOL 500 MG: 500 TABLET ORAL at 09:03

## 2025-03-01 RX ADMIN — DIAZEPAM 5 MG: 5 TABLET ORAL at 05:03

## 2025-03-01 RX ADMIN — METHOCARBAMOL 500 MG: 500 TABLET ORAL at 01:03

## 2025-03-01 RX ADMIN — MORPHINE SULFATE 4 MG: 4 INJECTION, SOLUTION INTRAMUSCULAR; INTRAVENOUS at 07:03

## 2025-03-01 RX ADMIN — PANTOPRAZOLE SODIUM 40 MG: 40 INJECTION, POWDER, FOR SOLUTION INTRAVENOUS at 08:03

## 2025-03-01 RX ADMIN — PREGABALIN 50 MG: 25 CAPSULE ORAL at 08:03
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
## 2025-03-01 NOTE — NURSING
Patient arrived to unit by stretcher aao x4 in stable condition denies pain or discomfort when asked oriented to staff and room voices no concerns at this time bed in lowest position personal items and call light in reach bed alarm refusal signed in chart.
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
## 2025-03-01 NOTE — ASSESSMENT & PLAN NOTE
Patient has Abnormal Magnesium: hypomagnesemia. Will continue to monitor electrolytes closely. Will replace the affected electrolytes and repeat labs to be done after interventions completed. The patient's magnesium results have been reviewed and are listed below.  Recent Labs   Lab 02/28/25  1427   MG 1.5*

## 2025-03-01 NOTE — SUBJECTIVE & OBJECTIVE
Past Medical History:   Diagnosis Date    C. difficile colitis     Cavitary pneumonia 11/2023    pos aspergillus serology    Diabetes mellitus 01/2022    Hyperlipidemia 2015    Hypertension 2015    Insomnia 2015    Ulcerative colitis        Past Surgical History:   Procedure Laterality Date    BRONCHOSCOPY WITH FLUOROSCOPY Left 11/20/2023    Procedure: BRONCHOSCOPY, WITH FLUOROSCOPY;  Surgeon: Lisa Villarreal MD;  Location: [facility];  Service: Pulmonary;  Laterality: Left;    BRONCHOSCOPY WITH FLUOROSCOPY N/A 11/30/2023    Procedure: BRONCHOSCOPY, WITH FLUOROSCOPY;  Surgeon: Lisa Villarreal MD;  Location: [facility];  Service: Pulmonary;  Laterality: N/A;    CARDIAC ELECTROPHYSIOLOGY MAPPING AND ABLATION  2017    SVT    CHOLECYSTECTOMY  2011    COLONOSCOPY N/A 5/3/2022    Procedure: COLONOSCOPY;  Surgeon: Shan Jean III, MD;  Location: [facility];  Service: Endoscopy;  Laterality: N/A;    COLONOSCOPY N/A 3/16/2024    Procedure: COLONOSCOPY;  Surgeon: ALEKSANDER Ramos MD;  Location: [facility];  Service: Endoscopy;  Laterality: N/A;    COLONOSCOPY N/A 1/17/2025    Procedure: COLONOSCOPY;  Surgeon: Russ Rowe MD;  Location: [facility];  Service: Endoscopy;  Laterality: N/A;    COLONOSCOPY WITH FECAL MICROBIOTA TRANSFER N/A 3/21/2023    Procedure: COLONOSCOPY, WITH FECAL MICROBIOTA TRANSFER;  Surgeon: David Millan MD;  Location: [facility];  Service: Endoscopy;  Laterality: N/A;    ESOPHAGOGASTRODUODENOSCOPY N/A 4/24/2023    Procedure: EGD (ESOPHAGOGASTRODUODENOSCOPY);  Surgeon: Shan Jean III, MD;  Location: [facility];  Service: Endoscopy;  Laterality: N/A;    ESOPHAGOGASTRODUODENOSCOPY N/A 6/5/2024    Procedure: EGD (ESOPHAGOGASTRODUODENOSCOPY);  Surgeon: Odalis Mccabe MD;  Location: [facility];  Service: Endoscopy;  Laterality: N/A;    FLEXIBLE SIGMOIDOSCOPY N/A 6/5/2024    Procedure: SIGMOIDOSCOPY, FLEXIBLE;  Surgeon: Odalis Mccabe MD;  Location: [facility];  Service:  Endoscopy;  Laterality: N/A;    FLEXIBLE SIGMOIDOSCOPY N/A 7/16/2024    Procedure: SIGMOIDOSCOPY, FLEXIBLE;  Surgeon: Shan Jean III, MD;  Location: [facility];  Service: Endoscopy;  Laterality: N/A;    FLEXIBLE SIGMOIDOSCOPY N/A 8/13/2024    Procedure: SIGMOIDOSCOPY, FLEXIBLE;  Surgeon: Shan Jean III, MD;  Location: [facility] END[facility];  Service: Endoscopy;  Laterality: N/A;    GANGLION CYST EXCISION Left 1992    UMBILICAL HERNIA REPAIR  2011    with mesh       Review of patient's allergies indicates:  No Known Allergies    Current Facility-Administered Medications on File Prior to Encounter   Medication    lactated ringers infusion     Current Outpatient Medications on File Prior to Encounter   Medication Sig    budesonide (ENTOCORT EC) 3 mg capsule Take 3 capsules (9 mg total) by mouth once daily.    busPIRone (BUSPAR) 15 MG tablet Take 1 tablet (15 mg total) by mouth 3 (three) times daily.    diazePAM (VALIUM) 5 MG tablet Take 1 tablet (5 mg total) by mouth daily as needed for Anxiety.    EScitalopram oxalate (LEXAPRO) 20 MG tablet Take 1 tablet (20 mg total) by mouth once daily.    Lactobac 2-Bifido 1-S. therm (VSL#3) 112.5 billion cell Cap Take 1 capsule by mouth twice a day    metoprolol succinate (TOPROL-XL) 50 MG 24 hr tablet Take 1 tablet (50 mg total) by mouth once daily.    pantoprazole (PROTONIX) 40 MG tablet Take 40 mg by mouth 2 (two) times daily.    pregabalin (LYRICA) 50 MG capsule Take 1 capsule by mouth 2 (two) times daily.    promethazine (PHENERGAN) 25 MG tablet Take 25 mg by mouth 4 (four) times daily as needed for Nausea.    rivaroxaban (XARELTO) 20 mg Tab Take 1 tablet (20 mg total) by mouth daily with dinner or evening meal.    simvastatin (ZOCOR) 20 MG tablet Take 1 tablet (20 mg total) by mouth every evening.    tofacitinib (XELJANZ) 10 mg Tab Take 10 mg by mouth 2 (two) times daily.    zolpidem (AMBIEN) 10 mg Tab Take 1 tablet (10 mg total) by mouth nightly as needed (insomnia).     [DISCONTINUED] erythromycin 500 mg TbEC Take by mouth.    ergocalciferol (ERGOCALCIFEROL) 50,000 unit Cap Take 1 capsule by mouth every Sunday.    oxyCODONE-acetaminophen (PERCOCET)  mg per tablet Take 1 tablet by mouth every 8 (eight) hours as needed for Pain. (Patient not taking: Reported on 2/28/2025)     Family History       Problem Relation (Age of Onset)    Asthma Mother          Tobacco Use    Smoking status: Former     Average packs/day: 1 pack/day for 30.0 years (30.0 ttl pk-yrs)     Types: Cigarettes     Start date: 1993    Smokeless tobacco: Never    Tobacco comments:     Pt states he has stopped smoking with Chantix three weeks ago. 12/1/23   Substance and Sexual Activity    Alcohol use: Yes     Comment: ocass    Drug use: Not Currently    Sexual activity: Not Currently     Review of Systems   Constitutional:  Negative for chills and fever.   HENT:  Negative for congestion and sore throat.    Respiratory:  Negative for shortness of breath.    Cardiovascular:  Negative for chest pain.   Gastrointestinal:  Positive for abdominal pain, blood in stool, diarrhea, nausea and vomiting. Negative for constipation.   Genitourinary:  Negative for difficulty urinating and flank pain.   Musculoskeletal:  Positive for back pain.   Skin:  Positive for color change and wound.   Neurological:  Negative for syncope.   Psychiatric/Behavioral:  Negative for confusion.      Objective:     Vital Signs (Most Recent):  Temp: 97.5 °F (36.4 °C) (02/28/25 1118)  Pulse: 90 (02/28/25 1953)  Resp: 11 (02/28/25 1953)  BP: (!) 144/67 (02/28/25 1930)  SpO2: 98 % (02/28/25 1953) Vital Signs (24h Range):  Temp:  [97.5 °F (36.4 °C)] 97.5 °F (36.4 °C)  Pulse:  [83-90] 90  Resp:  [11-50] 11  SpO2:  [95 %-100 %] 98 %  BP: (132-160)/(63-76) 144/67        There is no height or weight on file to calculate BMI.     Physical Exam  Vitals reviewed.   Constitutional:       General: He is not in acute distress.  HENT:      Head:  "Normocephalic and atraumatic.      Nose: Nose normal.      Mouth/Throat:      Mouth: Mucous membranes are moist.   Eyes:      Conjunctiva/sclera: Conjunctivae normal.   Cardiovascular:      Rate and Rhythm: Normal rate and regular rhythm.   Pulmonary:      Effort: Pulmonary effort is normal. No respiratory distress.      Breath sounds: Normal breath sounds.   Abdominal:      General: Bowel sounds are normal.      Palpations: Abdomen is soft.      Tenderness: There is abdominal tenderness.   Musculoskeletal:         General: Normal range of motion.      Cervical back: Normal range of motion.      Right lower leg: No edema.      Left lower leg: No edema.   Skin:     General: Skin is warm and dry.      Findings: Bruising (L FA) present.      Comments: L shoulder abrasion   Neurological:      Mental Status: He is alert and oriented to person, place, and time.   Psychiatric:         Mood and Affect: Mood normal.                Significant Labs: All pertinent labs within the past 24 hours have been reviewed.  Blood Culture: No results for input(s): "LABBLOO" in the last 48 hours.  CBC:   Recent Labs   Lab 02/28/25  1427   WBC 12.94*   HGB 10.4*   HCT 33.6*        CMP:   Recent Labs   Lab 02/28/25  1427      K 5.0      CO2 24   GLU 73   BUN 10   CREATININE 1.0   CALCIUM 8.8   PROT 6.2   ALBUMIN 3.5   BILITOT 0.5   ALKPHOS 72   AST 17   ALT 18   ANIONGAP 7*     Coagulation: No results for input(s): "PT", "INR", "APTT" in the last 48 hours.  Lactic Acid: No results for input(s): "LACTATE" in the last 48 hours.  Magnesium:   Recent Labs   Lab 02/28/25  1427   MG 1.5*     TSH:   Recent Labs   Lab 11/01/24  2204   TSH 0.515     Urine Studies:   Recent Labs   Lab 02/28/25  1801   COLORU Yellow   APPEARANCEUA Clear   PHUR 8.0   SPECGRAV >1.030*   PROTEINUA Negative   GLUCUA Negative   KETONESU Negative   BILIRUBINUA Negative   OCCULTUA Negative   NITRITE Negative   UROBILINOGEN Negative   LEUKOCYTESUR Negative "       Significant Imaging: I have reviewed all pertinent imaging results/findings within the past 24 hours.
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
## 2025-03-01 NOTE — SUBJECTIVE & OBJECTIVE
Oncology Treatment Plan:   [No matching plan found]    Medications:  Continuous Infusions:  Scheduled Meds:   atorvastatin  10 mg Oral Daily    busPIRone  15 mg Oral TID    EScitalopram oxalate  20 mg Oral Daily    Lactobacillus acidoph-L.bulgar  1 tablet Oral BID    methocarbamoL  500 mg Oral QID    metoprolol succinate  50 mg Oral Daily    pantoprazole  40 mg Intravenous BID    pregabalin  50 mg Oral BID    rivaroxaban  20 mg Oral Daily with dinner    tofacitinib  10 mg Oral BID     PRN Meds:  Current Facility-Administered Medications:     acetaminophen, 650 mg, Oral, Q4H PRN    aluminum-magnesium hydroxide-simethicone, 30 mL, Oral, QID PRN    dextrose 50%, 12.5 g, Intravenous, PRN    dextrose 50%, 25 g, Intravenous, PRN    diazePAM, 5 mg, Oral, Daily PRN    diphenhydrAMINE, 25 mg, Oral, Q6H PRN    glucagon (human recombinant), 1 mg, Intramuscular, PRN    glucose, 16 g, Oral, PRN    glucose, 24 g, Oral, PRN    insulin aspart U-100, 0-5 Units, Subcutaneous, QID (AC + HS) PRN    melatonin, 6 mg, Oral, Nightly PRN    morphine, 4 mg, Intravenous, Q4H PRN    naloxone, 0.02 mg, Intravenous, PRN    ondansetron, 4 mg, Intravenous, Q6H PRN    oxyCODONE, 10 mg, Oral, Q6H PRN    sodium chloride 0.9%, 10 mL, Intravenous, Q12H PRN    zolpidem, 5 mg, Oral, Nightly PRN     Review of patient's allergies indicates:  No Known Allergies     Past Medical History:   Diagnosis Date    C. difficile colitis     Cavitary pneumonia 11/2023    pos aspergillus serology    Diabetes mellitus 01/2022    Hyperlipidemia 2015    Hypertension 2015    Insomnia 2015    Ulcerative colitis      Past Surgical History:   Procedure Laterality Date    BRONCHOSCOPY WITH FLUOROSCOPY Left 11/20/2023    Procedure: BRONCHOSCOPY, WITH FLUOROSCOPY;  Surgeon: Lisa Villarreal MD;  Location: [facility];  Service: Pulmonary;  Laterality: Left;    BRONCHOSCOPY WITH FLUOROSCOPY N/A 11/30/2023    Procedure: BRONCHOSCOPY, WITH FLUOROSCOPY;  Surgeon: Lisa Villarreal  MD;  Location: [facility];  Service: Pulmonary;  Laterality: N/A;    CARDIAC ELECTROPHYSIOLOGY MAPPING AND ABLATION  2017    SVT    CHOLECYSTECTOMY  2011    COLONOSCOPY N/A 5/3/2022    Procedure: COLONOSCOPY;  Surgeon: Shan Jean III, MD;  Location: [facility];  Service: Endoscopy;  Laterality: N/A;    COLONOSCOPY N/A 3/16/2024    Procedure: COLONOSCOPY;  Surgeon: ALEKSANDER Ramos MD;  Location: [facility];  Service: Endoscopy;  Laterality: N/A;    COLONOSCOPY N/A 1/17/2025    Procedure: COLONOSCOPY;  Surgeon: Russ Rowe MD;  Location: [facility];  Service: Endoscopy;  Laterality: N/A;    COLONOSCOPY WITH FECAL MICROBIOTA TRANSFER N/A 3/21/2023    Procedure: COLONOSCOPY, WITH FECAL MICROBIOTA TRANSFER;  Surgeon: David Millan MD;  Location: [facility];  Service: Endoscopy;  Laterality: N/A;    ESOPHAGOGASTRODUODENOSCOPY N/A 4/24/2023    Procedure: EGD (ESOPHAGOGASTRODUODENOSCOPY);  Surgeon: Shan Jean III, MD;  Location: [facility];  Service: Endoscopy;  Laterality: N/A;    ESOPHAGOGASTRODUODENOSCOPY N/A 6/5/2024    Procedure: EGD (ESOPHAGOGASTRODUODENOSCOPY);  Surgeon: Odalis Mccabe MD;  Location: [facility];  Service: Endoscopy;  Laterality: N/A;    FLEXIBLE SIGMOIDOSCOPY N/A 6/5/2024    Procedure: SIGMOIDOSCOPY, FLEXIBLE;  Surgeon: Odalis Mccabe MD;  Location: [facility];  Service: Endoscopy;  Laterality: N/A;    FLEXIBLE SIGMOIDOSCOPY N/A 7/16/2024    Procedure: SIGMOIDOSCOPY, FLEXIBLE;  Surgeon: Shan Jean III, MD;  Location: [facility];  Service: Endoscopy;  Laterality: N/A;    FLEXIBLE SIGMOIDOSCOPY N/A 8/13/2024    Procedure: SIGMOIDOSCOPY, FLEXIBLE;  Surgeon: Shan Jean III, MD;  Location: [facility];  Service: Endoscopy;  Laterality: N/A;    GANGLION CYST EXCISION Left 1992    UMBILICAL HERNIA REPAIR  2011    with mesh     Family History       Problem Relation (Age of Onset)    Asthma Mother          Tobacco Use    Smoking status: Former     Average  packs/day: 1 pack/day for 30.0 years (30.0 ttl pk-yrs)     Types: Cigarettes     Start date: 1993    Smokeless tobacco: Never    Tobacco comments:     Pt states he has stopped smoking with Chantix three weeks ago. 12/1/23   Substance and Sexual Activity    Alcohol use: Yes     Comment: ocass    Drug use: Not Currently    Sexual activity: Not Currently       Review of Systems   Constitutional: Negative.    HENT: Negative.     Eyes: Negative.    Respiratory: Negative.     Cardiovascular: Negative.    Gastrointestinal: Negative.    Endocrine: Negative.    Genitourinary: Negative.    Musculoskeletal: Negative.    Skin: Negative.    Allergic/Immunologic: Negative.    Neurological: Negative.    Hematological: Negative.    Psychiatric/Behavioral: Negative.       Objective:     Vital Signs (Most Recent):  Temp: 97.5 °F (36.4 °C) (03/01/25 1139)  Pulse: 75 (03/01/25 1139)  Resp: 18 (03/01/25 1330)  BP: 125/82 (03/01/25 1139)  SpO2: (!) 93 % (03/01/25 1139) Vital Signs (24h Range):  Temp:  [97.5 °F (36.4 °C)-98.5 °F (36.9 °C)] 97.5 °F (36.4 °C)  Pulse:  [75-90] 75  Resp:  [11-50] 18  SpO2:  [93 %-100 %] 93 %  BP: (116-166)/(60-97) 125/82     Weight: 135.9 kg (299 lb 9.7 oz)  Body mass index is 48.36 kg/m².  Body surface area is 2.52 meters squared.      Intake/Output Summary (Last 24 hours) at 3/1/2025 1344  Last data filed at 3/1/2025 1132  Gross per 24 hour   Intake 1719.15 ml   Output --   Net 1719.15 ml        Physical Exam  Vitals and nursing note reviewed.   Constitutional:       Appearance: Normal appearance.   HENT:      Head: Normocephalic and atraumatic.      Nose: Nose normal.      Mouth/Throat:      Mouth: Mucous membranes are moist.      Pharynx: Oropharynx is clear.   Eyes:      Extraocular Movements: Extraocular movements intact.      Conjunctiva/sclera: Conjunctivae normal.   Cardiovascular:      Rate and Rhythm: Normal rate and regular rhythm.      Heart sounds: Normal heart sounds.   Pulmonary:      Effort:  Pulmonary effort is normal.      Breath sounds: Normal breath sounds.   Abdominal:      General: Abdomen is flat. Bowel sounds are normal.      Palpations: Abdomen is soft.   Musculoskeletal:         General: Normal range of motion.      Cervical back: Normal range of motion and neck supple.   Skin:     General: Skin is warm and dry.   Neurological:      General: No focal deficit present.      Mental Status: He is alert and oriented to person, place, and time. Mental status is at baseline.   Psychiatric:         Mood and Affect: Mood normal.          Significant Labs:   CBC:   Recent Labs   Lab 02/28/25  1427 03/01/25  0447   WBC 12.94* 7.15   HGB 10.4* 9.6*   HCT 33.6* 30.4*    189    and CMP:   Recent Labs   Lab 02/28/25  1427 03/01/25 0447 03/01/25  1133    131* 133*   K 5.0 6.3* 5.9*    101 100   CO2 24 26 27   GLU 73 174* 122*   BUN 10 12 12   CREATININE 1.0 1.0 1.0   CALCIUM 8.8 8.7 9.1   PROT 6.2 6.4  --    ALBUMIN 3.5 3.6  --    BILITOT 0.5 0.5  --    ALKPHOS 72 75  --    AST 17 14  --    ALT 18 17  --    ANIONGAP 7* 4* 6*       Diagnostic Results:  I have reviewed all pertinent imaging results/findings within the past 24 hours.
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
## 2025-03-01 NOTE — PT/OT/SLP EVAL
Occupational Therapy   Evaluation    Name: Jacoby Jean-Baptiste  MRN: [id number]  Admitting Diagnosis: Colitis  Recent Surgery: * No surgery found *      Recommendations:     Discharge Recommendations: Low Intensity Therapy  Discharge Equipment Recommendations:  none  Barriers to discharge:  None    Assessment:     Jacoby Jean-Baptiste is a 50 y.o. male with a medical diagnosis of Colitis.  He presents with general weakness. Performance deficits affecting function: weakness, impaired endurance, impaired self care skills, impaired functional mobility, gait instability, visual deficits, decreased safety awareness, decreased lower extremity function, decreased upper extremity function.      Rehab Prognosis: Fair; patient would benefit from acute skilled OT services to address these deficits and reach maximum level of function.       Plan:     Patient to be seen 5 x/week to address the above listed problems via self-care/home management, therapeutic activities, therapeutic exercises  Plan of Care Expires: 04/01/25  Plan of Care Reviewed with: patient    Subjective     Chief Complaint: General weakness  Patient/Family Comments/goals: Improved functional mobility and ADL independence.    Occupational Profile:  Living Environment: lives alone in a travel trailer, 3 steps, rail to enter.  Previous level of function: independent with ambulation using no AD, ADLs, IADls, working and driving.  Roles and Routines: primary homemaker  Equipment Used at Home: none  Assistance upon Discharge: limited support    Pain/Comfort:  Pain Rating 1: 5/10  Location 1:  (back and abdome)  Pain Addressed 1: Reposition, Distraction  Pain Rating Post-Intervention 1: 5/10    Patients cultural, spiritual, Adventism conflicts given the current situation: no    Objective:     Communicated with: nurse prior to session.  Patient found HOB elevated with telemetry, peripheral IV upon OT entry to room.    General Precautions: Standard, fall, special  contact  Orthopedic Precautions: N/A  Braces: N/A  Respiratory Status: Room air    Occupational Performance:    Bed Mobility:    Patient completed Scooting/Bridging with stand by assistance  Patient completed Supine to Sit with stand by assistance  Patient completed Sit to Supine with stand by assistance    Functional Mobility/Transfers:  Patient completed Sit <> Stand Transfer with stand by assistance  with  no assistive device   Functional Mobility: ambulated 15 feet in the hospital room with stand by assistance using no AD.     Activities of Daily Living:  Grooming: stand by assistance to wash hands standing at sink.    Cognitive/Visual Perceptual:  Cognitive/Psychosocial Skills:     -       Oriented to: Person, Place, Time, and Situation   -       Follows Commands/attention:Follows multistep  commands  -       Communication: clear/fluent  -       Memory: No Deficits noted  -       Safety awareness/insight to disability: intact   -       Mood/Affect/Coping skills/emotional control: Cooperative and Pleasant  Visual/Perceptual:      -Intact Acuity    Physical Exam:  Balance:    -       Sitting/Standing: Stand by Assist  Upper Extremity Range of Motion:     -       Right Upper Extremity: WFL  -       Left Upper Extremity: WFL  Upper Extremity Strength:    -       Right Upper Extremity: WFL  -       Left Upper Extremity: WFL   Strength:    -       Right Upper Extremity: WFL  -       Left Upper Extremity: WFL  Fine Motor Coordination:    -       Intact    AMPAC 6 Click ADL:  AMPAC Total Score: 19    Treatment & Education:  Patient educated on the purpose of Occupational Therapy and the importance of getting OOB.     Patient left HOB elevated with all lines intact and call button in reach    GOALS:   Multidisciplinary Problems       Occupational Therapy Goals          Problem: Occupational Therapy    Goal Priority Disciplines Outcome Interventions   Occupational Therapy Goal     OT, PT/OT     Description: Goals to  be met by: 4/1/2025     Patient will increase functional independence with ADLs by performing:    UE Dressing with Supervision.  LE Dressing with Supervision.  Grooming while standing at sink with Supervision.  Toileting from toilet with Supervision for hygiene and clothing management.   Toilet transfer to toilet with Supervision.  Perform sitting/standing ADL activity with no LOB.                         History:     Past Medical History:   Diagnosis Date    C. difficile colitis     Cavitary pneumonia 11/2023    pos aspergillus serology    Diabetes mellitus 01/2022    Hyperlipidemia 2015    Hypertension 2015    Insomnia 2015    Ulcerative colitis          Past Surgical History:   Procedure Laterality Date    BRONCHOSCOPY WITH FLUOROSCOPY Left 11/20/2023    Procedure: BRONCHOSCOPY, WITH FLUOROSCOPY;  Surgeon: Lisa Villarreal MD;  Location: [facility];  Service: Pulmonary;  Laterality: Left;    BRONCHOSCOPY WITH FLUOROSCOPY N/A 11/30/2023    Procedure: BRONCHOSCOPY, WITH FLUOROSCOPY;  Surgeon: Lisa Villarreal MD;  Location: [facility];  Service: Pulmonary;  Laterality: N/A;    CARDIAC ELECTROPHYSIOLOGY MAPPING AND ABLATION  2017    SVT    CHOLECYSTECTOMY  2011    COLONOSCOPY N/A 5/3/2022    Procedure: COLONOSCOPY;  Surgeon: Shan Jean III, MD;  Location: [facility];  Service: Endoscopy;  Laterality: N/A;    COLONOSCOPY N/A 3/16/2024    Procedure: COLONOSCOPY;  Surgeon: ALEKSANDER Ramos MD;  Location: [facility];  Service: Endoscopy;  Laterality: N/A;    COLONOSCOPY N/A 1/17/2025    Procedure: COLONOSCOPY;  Surgeon: Russ Rowe MD;  Location: [facility];  Service: Endoscopy;  Laterality: N/A;    COLONOSCOPY WITH FECAL MICROBIOTA TRANSFER N/A 3/21/2023    Procedure: COLONOSCOPY, WITH FECAL MICROBIOTA TRANSFER;  Surgeon: David Millan MD;  Location: [facility];  Service: Endoscopy;  Laterality: N/A;    ESOPHAGOGASTRODUODENOSCOPY N/A 4/24/2023    Procedure: EGD (ESOPHAGOGASTRODUODENOSCOPY);   Surgeon: Shan Jean III, MD;  Location: [facility];  Service: Endoscopy;  Laterality: N/A;    ESOPHAGOGASTRODUODENOSCOPY N/A 6/5/2024    Procedure: EGD (ESOPHAGOGASTRODUODENOSCOPY);  Surgeon: Odalis Mccabe MD;  Location: [facility];  Service: Endoscopy;  Laterality: N/A;    FLEXIBLE SIGMOIDOSCOPY N/A 6/5/2024    Procedure: SIGMOIDOSCOPY, FLEXIBLE;  Surgeon: Odalis Mccabe MD;  Location: [facility];  Service: Endoscopy;  Laterality: N/A;    FLEXIBLE SIGMOIDOSCOPY N/A 7/16/2024    Procedure: SIGMOIDOSCOPY, FLEXIBLE;  Surgeon: Shan Jean III, MD;  Location: [facility];  Service: Endoscopy;  Laterality: N/A;    FLEXIBLE SIGMOIDOSCOPY N/A 8/13/2024    Procedure: SIGMOIDOSCOPY, FLEXIBLE;  Surgeon: Shan Jean III, MD;  Location: [facility];  Service: Endoscopy;  Laterality: N/A;    GANGLION CYST EXCISION Left 1992    UMBILICAL HERNIA REPAIR  2011    with mesh       Time Tracking:     OT Date of Treatment: 03/01/25  OT Start Time: 0956  OT Stop Time: 1006  OT Total Time (min): 10 min    Billable Minutes:Evaluation 10    3/1/2025

## 2025-03-01 NOTE — ASSESSMENT & PLAN NOTE
Hx UC, c diff s/p fecal transplant, proctocolitis  Blood cx's, lactic acid, daily CBC  Stool studies  Steroids   IVF  Pain control   I&O  Consult GI

## 2025-03-01 NOTE — CONSULTS
[facility]  Hematology/Oncology  Consult Note    Patient Name: Jacoby Jean-Baptiste  MRN: [id number]  Admission Date: 2/28/2025  Hospital Length of Stay: 0 days  Code Status: Full Code   Attending Provider: Anais Leung MD  Consulting Provider: Aurash Khoobehi, MD  Primary Care Physician: Hunter Aguilar III, MD  Principal Problem:Colitis    Consults  Subjective:     HPI:  This is a 50-year-old male with history of ulcerative colitis with plans for colon resection soon on 3/6/25, currently admitted with complications from this.  I was consulted as patient is on Xarelto and occasionally has GI bleed due to his ulcerative colitis.  He was started on Xarelto back in September 2024 for a right upper extremity DVT in the central veins.  He has been on Xarelto ever since then.  He tolerates it okay aside from GI bleed but states he was having GI bleed due to the UC even prior to being on Xarelto.  He has no other complaints me.    Oncology Treatment Plan:   [No matching plan found]    Medications:  Continuous Infusions:  Scheduled Meds:   atorvastatin  10 mg Oral Daily    busPIRone  15 mg Oral TID    EScitalopram oxalate  20 mg Oral Daily    Lactobacillus acidoph-L.bulgar  1 tablet Oral BID    methocarbamoL  500 mg Oral QID    metoprolol succinate  50 mg Oral Daily    pantoprazole  40 mg Intravenous BID    pregabalin  50 mg Oral BID    rivaroxaban  20 mg Oral Daily with dinner    tofacitinib  10 mg Oral BID     PRN Meds:  Current Facility-Administered Medications:     acetaminophen, 650 mg, Oral, Q4H PRN    aluminum-magnesium hydroxide-simethicone, 30 mL, Oral, QID PRN    dextrose 50%, 12.5 g, Intravenous, PRN    dextrose 50%, 25 g, Intravenous, PRN    diazePAM, 5 mg, Oral, Daily PRN    diphenhydrAMINE, 25 mg, Oral, Q6H PRN    glucagon (human recombinant), 1 mg, Intramuscular, PRN    glucose, 16 g, Oral, PRN    glucose, 24 g, Oral, PRN    insulin aspart U-100, 0-5 Units, Subcutaneous, QID (AC + HS)  PRN    melatonin, 6 mg, Oral, Nightly PRN    morphine, 4 mg, Intravenous, Q4H PRN    naloxone, 0.02 mg, Intravenous, PRN    ondansetron, 4 mg, Intravenous, Q6H PRN    oxyCODONE, 10 mg, Oral, Q6H PRN    sodium chloride 0.9%, 10 mL, Intravenous, Q12H PRN    zolpidem, 5 mg, Oral, Nightly PRN     Review of patient's allergies indicates:  No Known Allergies     Past Medical History:   Diagnosis Date    C. difficile colitis     Cavitary pneumonia 11/2023    pos aspergillus serology    Diabetes mellitus 01/2022    Hyperlipidemia 2015    Hypertension 2015    Insomnia 2015    Ulcerative colitis      Past Surgical History:   Procedure Laterality Date    BRONCHOSCOPY WITH FLUOROSCOPY Left 11/20/2023    Procedure: BRONCHOSCOPY, WITH FLUOROSCOPY;  Surgeon: Lisa Villarreal MD;  Location: [facility];  Service: Pulmonary;  Laterality: Left;    BRONCHOSCOPY WITH FLUOROSCOPY N/A 11/30/2023    Procedure: BRONCHOSCOPY, WITH FLUOROSCOPY;  Surgeon: Lisa Villarreal MD;  Location: [facility];  Service: Pulmonary;  Laterality: N/A;    CARDIAC ELECTROPHYSIOLOGY MAPPING AND ABLATION  2017    SVT    CHOLECYSTECTOMY  2011    COLONOSCOPY N/A 5/3/2022    Procedure: COLONOSCOPY;  Surgeon: Shan Jean III, MD;  Location: [facility];  Service: Endoscopy;  Laterality: N/A;    COLONOSCOPY N/A 3/16/2024    Procedure: COLONOSCOPY;  Surgeon: ALEKSANDER Ramos MD;  Location: [facility];  Service: Endoscopy;  Laterality: N/A;    COLONOSCOPY N/A 1/17/2025    Procedure: COLONOSCOPY;  Surgeon: Russ Rowe MD;  Location: [facility];  Service: Endoscopy;  Laterality: N/A;    COLONOSCOPY WITH FECAL MICROBIOTA TRANSFER N/A 3/21/2023    Procedure: COLONOSCOPY, WITH FECAL MICROBIOTA TRANSFER;  Surgeon: David Millan MD;  Location: [facility];  Service: Endoscopy;  Laterality: N/A;    ESOPHAGOGASTRODUODENOSCOPY N/A 4/24/2023    Procedure: EGD (ESOPHAGOGASTRODUODENOSCOPY);  Surgeon: Shan Jean III, MD;  Location: [facility];  Service:  Endoscopy;  Laterality: N/A;    ESOPHAGOGASTRODUODENOSCOPY N/A 6/5/2024    Procedure: EGD (ESOPHAGOGASTRODUODENOSCOPY);  Surgeon: Odalis Mccabe MD;  Location: [facility];  Service: Endoscopy;  Laterality: N/A;    FLEXIBLE SIGMOIDOSCOPY N/A 6/5/2024    Procedure: SIGMOIDOSCOPY, FLEXIBLE;  Surgeon: Odalis Mccabe MD;  Location: [facility];  Service: Endoscopy;  Laterality: N/A;    FLEXIBLE SIGMOIDOSCOPY N/A 7/16/2024    Procedure: SIGMOIDOSCOPY, FLEXIBLE;  Surgeon: Shan Jean III, MD;  Location: [facility];  Service: Endoscopy;  Laterality: N/A;    FLEXIBLE SIGMOIDOSCOPY N/A 8/13/2024    Procedure: SIGMOIDOSCOPY, FLEXIBLE;  Surgeon: Shan Jean III, MD;  Location: [facility];  Service: Endoscopy;  Laterality: N/A;    GANGLION CYST EXCISION Left 1992    UMBILICAL HERNIA REPAIR  2011    with mesh     Family History       Problem Relation (Age of Onset)    Asthma Mother          Tobacco Use    Smoking status: Former     Average packs/day: 1 pack/day for 30.0 years (30.0 ttl pk-yrs)     Types: Cigarettes     Start date: 1993    Smokeless tobacco: Never    Tobacco comments:     Pt states he has stopped smoking with Chantix three weeks ago. 12/1/23   Substance and Sexual Activity    Alcohol use: Yes     Comment: ocass    Drug use: Not Currently    Sexual activity: Not Currently       Review of Systems   Constitutional: Negative.    HENT: Negative.     Eyes: Negative.    Respiratory: Negative.     Cardiovascular: Negative.    Gastrointestinal: Negative.    Endocrine: Negative.    Genitourinary: Negative.    Musculoskeletal: Negative.    Skin: Negative.    Allergic/Immunologic: Negative.    Neurological: Negative.    Hematological: Negative.    Psychiatric/Behavioral: Negative.       Objective:     Vital Signs (Most Recent):  Temp: 97.5 °F (36.4 °C) (03/01/25 1139)  Pulse: 75 (03/01/25 1139)  Resp: 18 (03/01/25 1330)  BP: 125/82 (03/01/25 1139)  SpO2: (!) 93 % (03/01/25 1139) Vital Signs (24h  Range):  Temp:  [97.5 °F (36.4 °C)-98.5 °F (36.9 °C)] 97.5 °F (36.4 °C)  Pulse:  [75-90] 75  Resp:  [11-50] 18  SpO2:  [93 %-100 %] 93 %  BP: (116-166)/(60-97) 125/82     Weight: 135.9 kg (299 lb 9.7 oz)  Body mass index is 48.36 kg/m².  Body surface area is 2.52 meters squared.      Intake/Output Summary (Last 24 hours) at 3/1/2025 1344  Last data filed at 3/1/2025 1132  Gross per 24 hour   Intake 1719.15 ml   Output --   Net 1719.15 ml        Physical Exam  Vitals and nursing note reviewed.   Constitutional:       Appearance: Normal appearance.   HENT:      Head: Normocephalic and atraumatic.      Nose: Nose normal.      Mouth/Throat:      Mouth: Mucous membranes are moist.      Pharynx: Oropharynx is clear.   Eyes:      Extraocular Movements: Extraocular movements intact.      Conjunctiva/sclera: Conjunctivae normal.   Cardiovascular:      Rate and Rhythm: Normal rate and regular rhythm.      Heart sounds: Normal heart sounds.   Pulmonary:      Effort: Pulmonary effort is normal.      Breath sounds: Normal breath sounds.   Abdominal:      General: Abdomen is flat. Bowel sounds are normal.      Palpations: Abdomen is soft.   Musculoskeletal:         General: Normal range of motion.      Cervical back: Normal range of motion and neck supple.   Skin:     General: Skin is warm and dry.   Neurological:      General: No focal deficit present.      Mental Status: He is alert and oriented to person, place, and time. Mental status is at baseline.   Psychiatric:         Mood and Affect: Mood normal.          Significant Labs:   CBC:   Recent Labs   Lab 02/28/25  1427 03/01/25  0447   WBC 12.94* 7.15   HGB 10.4* 9.6*   HCT 33.6* 30.4*    189    and CMP:   Recent Labs   Lab 02/28/25  1427 03/01/25  0447 03/01/25  1133    131* 133*   K 5.0 6.3* 5.9*    101 100   CO2 24 26 27   GLU 73 174* 122*   BUN 10 12 12   CREATININE 1.0 1.0 1.0   CALCIUM 8.8 8.7 9.1   PROT 6.2 6.4  --    ALBUMIN 3.5 3.6  --    BILITOT  0.5 0.5  --    ALKPHOS 72 75  --    AST 17 14  --    ALT 18 17  --    ANIONGAP 7* 4* 6*       Diagnostic Results:  I have reviewed all pertinent imaging results/findings within the past 24 hours.  Assessment/Plan:     History of DVT (deep vein thrombosis)  -I discussed with him the risks and benefits of stopping Xarelto   -technically he did have a provoked DVT in his right upper extremity and so he could stop Xarelto after 3 months of anticoagulation which he has had.  I did explain to him though that this does not guarantee he will not have another clot especially given that he is a young male, this does increase his risk.  His ulcerative colitis does not increase his risk as well but that should reduce once he has his colon removed.  Nevertheless, I can get a right upper extremity DVT to help make this decision.  If it is clear, then he can stop his Xarelto with the caveats that he does have about a 4-6% risk of having a DVT recurrence.  He understands this in his willing to stop.        Thank you for your consult. I will follow-up with patient. Please contact us if you have any additional questions.    Aurash Khoobehi, MD  Hematology/Oncology  [facility]
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
## 2025-03-01 NOTE — ASSESSMENT & PLAN NOTE
The patients most recent potassium results are listed below.  Recent Labs     02/28/25  1427 03/01/25  0447 03/01/25  1133   K 5.0 6.3* 5.9*     Plan  - Monitor for arrhythmias with EKG and/or continuous telemetry.   - Treat the hyperkalemia with Calcium gluconate, IV insulin and dextrose, and Lokelma  .   - Monitor potassium: Daily  - The patient's hyperkalemia is improving

## 2025-03-01 NOTE — ASSESSMENT & PLAN NOTE
Patient's FSGs are controlled on current medication regimen.  Last A1c reviewed-   Lab Results   Component Value Date    HGBA1C 5.8 11/02/2024     Most recent fingerstick glucose reviewed-   Recent Labs   Lab 03/01/25  0746   POCTGLUCOSE 169*     Current correctional scale  Low  Maintain anti-hyperglycemic dose as follows-   Antihyperglycemics (From admission, onward)    Start     Stop Route Frequency Ordered    02/28/25 2047  insulin aspart U-100 pen 0-5 Units         -- SubQ Before meals & nightly PRN 02/28/25 1959        Hold Oral hypoglycemics while patient is in the hospital.

## 2025-03-01 NOTE — ASSESSMENT & PLAN NOTE
"Patient's FSGs are controlled on current medication regimen.  Last A1c reviewed-   Lab Results   Component Value Date    HGBA1C 5.8 11/02/2024     Most recent fingerstick glucose reviewed- No results for input(s): "POCTGLUCOSE" in the last 24 hours.  Current correctional scale  Low  Maintain anti-hyperglycemic dose as follows-   Antihyperglycemics (From admission, onward)      Start     Stop Route Frequency Ordered    02/28/25 2047  insulin aspart U-100 pen 0-5 Units         -- SubQ Before meals & nightly PRN 02/28/25 1959          Hold Oral hypoglycemics while patient is in the hospital.     "

## 2025-03-01 NOTE — HPI
This is a 50-year-old male with history of ulcerative colitis with plans for colon resection soon on 3/6/25, currently admitted with complications from this.  I was consulted as patient is on Xarelto and occasionally has GI bleed due to his ulcerative colitis.  He was started on Xarelto back in September 2024 for a right upper extremity DVT in the central veins.  He has been on Xarelto ever since then.  He tolerates it okay aside from GI bleed but states he was having GI bleed due to the UC even prior to being on Xarelto.  He has no other complaints me.

## 2025-03-01 NOTE — ASSESSMENT & PLAN NOTE
-I discussed with him the risks and benefits of stopping Xarelto   -technically he did have a provoked DVT in his right upper extremity and so he could stop Xarelto after 3 months of anticoagulation which he has had.  I did explain to him though that this does not guarantee he will not have another clot especially given that he is a young male, this does increase his risk.  His ulcerative colitis does not increase his risk as well but that should reduce once he has his colon removed.  Nevertheless, I can get a right upper extremity DVT to help make this decision.  If it is clear, then he can stop his Xarelto with the caveats that he does have about a 4-6% risk of having a DVT recurrence.  He understands this in his willing to stop.

## 2025-03-01 NOTE — ASSESSMENT & PLAN NOTE
Anemia is likely due to chronic blood loss. Most recent hemoglobin and hematocrit are listed below.  Recent Labs     02/28/25  1427 03/01/25  0447   HGB 10.4* 9.6*   HCT 33.6* 30.4*       Plan  - Monitor serial CBC: Daily  - Transfuse PRBC if patient becomes hemodynamically unstable, symptomatic or H/H drops below 7/21.  - Patient has not received any PRBC transfusions this admission  - Patient's anemia is currently stable  - Protonix  - resume xarelto and monitor Hb

## 2025-03-01 NOTE — ASSESSMENT & PLAN NOTE
Anemia is likely due to chronic blood loss. Most recent hemoglobin and hematocrit are listed below.  Recent Labs     02/28/25  1427   HGB 10.4*   HCT 33.6*     Plan  - Monitor serial CBC: Daily  - Transfuse PRBC if patient becomes hemodynamically unstable, symptomatic or H/H drops below 7/21.  - Patient has not received any PRBC transfusions this admission  - Patient's anemia is currently stable  -Coags  - Protonix  -Hold home xarelto  -Consult GI

## 2025-03-01 NOTE — PLAN OF CARE
Problem: Adult Inpatient Plan of Care  Goal: Plan of Care Review  3/1/2025 1654 by Daniel Farrell RN  Outcome: Progressing  3/1/2025 1653 by Daniel Farrell RN  Outcome: Progressing  3/1/2025 1653 by Daniel Farrell RN  Outcome: Progressing  3/1/2025 1651 by Daniel Farrell RN  Outcome: Progressing  Goal: Patient-Specific Goal (Individualized)  3/1/2025 1654 by Daniel Farrell RN  Outcome: Progressing  3/1/2025 1653 by Daniel Farrell RN  Outcome: Progressing  3/1/2025 1651 by Daniel Farrell RN  Outcome: Progressing  Goal: Absence of Hospital-Acquired Illness or Injury  3/1/2025 1654 by Daniel Farrell RN  Outcome: Progressing  3/1/2025 1653 by Daniel Farrell RN  Outcome: Progressing  3/1/2025 1651 by Daniel Farrell RN  Outcome: Progressing  Goal: Optimal Comfort and Wellbeing  3/1/2025 1654 by Daniel Farrell RN  Outcome: Progressing  3/1/2025 1653 by Daniel Farrell RN  Outcome: Progressing  3/1/2025 1651 by Daniel Farrell RN  Outcome: Progressing  Goal: Readiness for Transition of Care  3/1/2025 1654 by Daniel Farrell RN  Outcome: Progressing  3/1/2025 1653 by Daniel Farrell RN  Outcome: Progressing  3/1/2025 1651 by Daniel Farrell RN  Outcome: Progressing

## 2025-03-01 NOTE — ASSESSMENT & PLAN NOTE
Patient has a history of UC and colectomy has been planned for 3/6  None of the stool studies were sent as patient did not have any diarrhea since admission   Low fiber diet resumed

## 2025-03-01 NOTE — PHARMACY MED REC
"Admission Medication History     The home medication history was taken by Yu Holloway.    You may go to "Admission" then "Reconcile Home Medications" tabs to review and/or act upon these items.     The home medication list has been updated by the Pharmacy department.   Please read ALL comments highlighted in yellow.   Please address this information as you see fit.    Feel free to contact us if you have any questions or require assistance.        Medications listed below were obtained from: Patient/family and Analytic software- [employer]  Medications Ordered Prior to Encounter[1]    Potential issues to be addressed PRIOR TO DISCHARGE  Patient reported not taking the following medications: (Percocet). These medications remain on the home medication list. Please address accordingly.     Yu Holloway  EXT 1922                  .                 [1]  Current Facility-Administered Medications on File Prior to Encounter   Medication Dose Route Frequency Provider Last Rate Last Admin    lactated ringers infusion   Intravenous Continuous David Millan MD 75 mL/hr at 03/21/23 1252 New Bag at 03/21/23 1252     Current Outpatient Medications on File Prior to Encounter   Medication Sig Dispense Refill    budesonide (ENTOCORT EC) 3 mg capsule Take 3 capsules (9 mg total) by mouth once daily. 180 each 0    busPIRone (BUSPAR) 15 MG tablet Take 1 tablet (15 mg total) by mouth 3 (three) times daily. 90 tablet 5    diazePAM (VALIUM) 5 MG tablet Take 1 tablet (5 mg total) by mouth daily as needed for Anxiety. 30 tablet 0    EScitalopram oxalate (LEXAPRO) 20 MG tablet Take 1 tablet (20 mg total) by mouth once daily. 90 tablet 0    Lactobac 2-Bifido 1-S. therm (VSL#3) 112.5 billion cell Cap Take 1 capsule by mouth twice a day 60 capsule 0    metoprolol succinate (TOPROL-XL) 50 MG 24 hr tablet Take 1 tablet (50 mg total) by mouth once daily. 90 tablet 1    pantoprazole (PROTONIX) 40 MG tablet Take 40 mg by mouth 2 " (two) times daily.      pregabalin (LYRICA) 50 MG capsule Take 1 capsule by mouth 2 (two) times daily.      promethazine (PHENERGAN) 25 MG tablet Take 25 mg by mouth 4 (four) times daily as needed for Nausea.      rivaroxaban (XARELTO) 20 mg Tab Take 1 tablet (20 mg total) by mouth daily with dinner or evening meal. 90 tablet 0    simvastatin (ZOCOR) 20 MG tablet Take 1 tablet (20 mg total) by mouth every evening. 90 tablet 1    tofacitinib (XELJANZ) 10 mg Tab Take 10 mg by mouth 2 (two) times daily.      zolpidem (AMBIEN) 10 mg Tab Take 1 tablet (10 mg total) by mouth nightly as needed (insomnia). 30 tablet 2    [DISCONTINUED] erythromycin 500 mg TbEC Take by mouth.      ergocalciferol (ERGOCALCIFEROL) 50,000 unit Cap Take 1 capsule by mouth every Sunday.      oxyCODONE-acetaminophen (PERCOCET)  mg per tablet Take 1 tablet by mouth every 8 (eight) hours as needed for Pain. (Patient not taking: Reported on 2/28/2025) 15 tablet 0

## 2025-03-02 ENCOUNTER — PATIENT MESSAGE (OUTPATIENT)
Dept: FAMILY MEDICINE | Facility: CLINIC | Age: 50
End: 2025-03-02
Payer: COMMERCIAL

## 2025-03-02 VITALS
RESPIRATION RATE: 18 BRPM | WEIGHT: 299.63 LBS | BODY MASS INDEX: 48.15 KG/M2 | TEMPERATURE: 98 F | HEIGHT: 66 IN | SYSTOLIC BLOOD PRESSURE: 100 MMHG | DIASTOLIC BLOOD PRESSURE: 60 MMHG | OXYGEN SATURATION: 93 % | HEART RATE: 77 BPM

## 2025-03-02 LAB
ALBUMIN SERPL BCP-MCNC: 3.5 G/DL (ref 3.5–5.2)
ALP SERPL-CCNC: 62 U/L (ref 55–135)
ALT SERPL W/O P-5'-P-CCNC: 15 U/L (ref 10–44)
ANION GAP SERPL CALC-SCNC: 5 MMOL/L (ref 8–16)
AST SERPL-CCNC: 13 U/L (ref 10–40)
BASOPHILS # BLD AUTO: 0.01 K/UL (ref 0–0.2)
BASOPHILS NFR BLD: 0.1 % (ref 0–1.9)
BILIRUB SERPL-MCNC: 0.3 MG/DL (ref 0.1–1)
BUN SERPL-MCNC: 17 MG/DL (ref 6–20)
CALCIUM SERPL-MCNC: 8.3 MG/DL (ref 8.7–10.5)
CHLORIDE SERPL-SCNC: 103 MMOL/L (ref 95–110)
CO2 SERPL-SCNC: 26 MMOL/L (ref 23–29)
CREAT SERPL-MCNC: 1.3 MG/DL (ref 0.5–1.4)
DIFFERENTIAL METHOD BLD: ABNORMAL
EOSINOPHIL # BLD AUTO: 0 K/UL (ref 0–0.5)
EOSINOPHIL NFR BLD: 0.4 % (ref 0–8)
ERYTHROCYTE [DISTWIDTH] IN BLOOD BY AUTOMATED COUNT: 21.1 % (ref 11.5–14.5)
EST. GFR  (NO RACE VARIABLE): >60 ML/MIN/1.73 M^2
GLUCOSE SERPL-MCNC: 107 MG/DL (ref 70–110)
HCT VFR BLD AUTO: 29.3 % (ref 40–54)
HGB BLD-MCNC: 8.8 G/DL (ref 14–18)
IMM GRANULOCYTES # BLD AUTO: 0.05 K/UL (ref 0–0.04)
IMM GRANULOCYTES NFR BLD AUTO: 0.5 % (ref 0–0.5)
LYMPHOCYTES # BLD AUTO: 1.1 K/UL (ref 1–4.8)
LYMPHOCYTES NFR BLD: 10.6 % (ref 18–48)
MAGNESIUM SERPL-MCNC: 1.7 MG/DL (ref 1.6–2.6)
MCH RBC QN AUTO: 27.8 PG (ref 27–31)
MCHC RBC AUTO-ENTMCNC: 30 G/DL (ref 32–36)
MCV RBC AUTO: 92 FL (ref 82–98)
MONOCYTES # BLD AUTO: 1.4 K/UL (ref 0.3–1)
MONOCYTES NFR BLD: 13.2 % (ref 4–15)
NEUTROPHILS # BLD AUTO: 7.8 K/UL (ref 1.8–7.7)
NEUTROPHILS NFR BLD: 75.2 % (ref 38–73)
NRBC BLD-RTO: 0 /100 WBC
PLATELET # BLD AUTO: 202 K/UL (ref 150–450)
PMV BLD AUTO: 10.9 FL (ref 9.2–12.9)
POCT GLUCOSE: 100 MG/DL (ref 70–110)
POCT GLUCOSE: 116 MG/DL (ref 70–110)
POCT GLUCOSE: 68 MG/DL (ref 70–110)
POCT GLUCOSE: 85 MG/DL (ref 70–110)
POTASSIUM SERPL-SCNC: 4.7 MMOL/L (ref 3.5–5.1)
PROT SERPL-MCNC: 6.1 G/DL (ref 6–8.4)
RBC # BLD AUTO: 3.17 M/UL (ref 4.6–6.2)
SODIUM SERPL-SCNC: 134 MMOL/L (ref 136–145)
WBC # BLD AUTO: 10.42 K/UL (ref 3.9–12.7)

## 2025-03-02 PROCEDURE — 36415 COLL VENOUS BLD VENIPUNCTURE: CPT

## 2025-03-02 PROCEDURE — 85025 COMPLETE CBC W/AUTO DIFF WBC: CPT

## 2025-03-02 PROCEDURE — 80053 COMPREHEN METABOLIC PANEL: CPT

## 2025-03-02 PROCEDURE — 63600175 PHARM REV CODE 636 W HCPCS

## 2025-03-02 PROCEDURE — 25000003 PHARM REV CODE 250

## 2025-03-02 PROCEDURE — 83735 ASSAY OF MAGNESIUM: CPT

## 2025-03-02 PROCEDURE — 25000003 PHARM REV CODE 250: Performed by: HOSPITALIST

## 2025-03-02 RX ORDER — OXYCODONE HYDROCHLORIDE 10 MG/1
10 TABLET ORAL EVERY 6 HOURS PRN
Qty: 12 TABLET | Refills: 0 | Status: ON HOLD | OUTPATIENT
Start: 2025-03-02 | End: 2025-03-07

## 2025-03-02 RX ORDER — OXYCODONE HYDROCHLORIDE 10 MG/1
10 TABLET ORAL EVERY 6 HOURS PRN
Qty: 12 TABLET | Refills: 0 | Status: SHIPPED | OUTPATIENT
Start: 2025-03-02 | End: 2025-03-02

## 2025-03-02 RX ADMIN — OXYCODONE HYDROCHLORIDE 10 MG: 10 TABLET ORAL at 01:03

## 2025-03-02 RX ADMIN — DIPHENHYDRAMINE HYDROCHLORIDE 25 MG: 25 CAPSULE ORAL at 08:03

## 2025-03-02 RX ADMIN — BUSPIRONE HYDROCHLORIDE 15 MG: 5 TABLET ORAL at 03:03

## 2025-03-02 RX ADMIN — ONDANSETRON 4 MG: 2 INJECTION INTRAMUSCULAR; INTRAVENOUS at 04:03

## 2025-03-02 RX ADMIN — METHOCARBAMOL 500 MG: 500 TABLET ORAL at 08:03

## 2025-03-02 RX ADMIN — METOPROLOL SUCCINATE 50 MG: 50 TABLET, FILM COATED, EXTENDED RELEASE ORAL at 08:03

## 2025-03-02 RX ADMIN — MORPHINE SULFATE 4 MG: 4 INJECTION, SOLUTION INTRAMUSCULAR; INTRAVENOUS at 04:03

## 2025-03-02 RX ADMIN — METHOCARBAMOL 500 MG: 500 TABLET ORAL at 03:03

## 2025-03-02 RX ADMIN — Medication 1 TABLET: at 08:03

## 2025-03-02 RX ADMIN — PREGABALIN 50 MG: 25 CAPSULE ORAL at 08:03

## 2025-03-02 RX ADMIN — BUSPIRONE HYDROCHLORIDE 15 MG: 5 TABLET ORAL at 08:03

## 2025-03-02 RX ADMIN — PANTOPRAZOLE SODIUM 40 MG: 40 INJECTION, POWDER, FOR SOLUTION INTRAVENOUS at 08:03

## 2025-03-02 RX ADMIN — DIAZEPAM 5 MG: 5 TABLET ORAL at 11:03

## 2025-03-02 RX ADMIN — OXYCODONE HYDROCHLORIDE 10 MG: 10 TABLET ORAL at 08:03

## 2025-03-02 RX ADMIN — ESCITALOPRAM OXALATE 20 MG: 10 TABLET ORAL at 08:03

## 2025-03-02 RX ADMIN — ATORVASTATIN CALCIUM 10 MG: 10 TABLET, FILM COATED ORAL at 08:03

## 2025-03-02 NOTE — ASSESSMENT & PLAN NOTE
Patient was seen by hematology   Rpt upper extremity DVT US did not show any DVT   Xarelto discontinued on discharge per hematology recommendations

## 2025-03-02 NOTE — ASSESSMENT & PLAN NOTE
Patient's FSGs are controlled on current medication regimen.  Last A1c reviewed-   Lab Results   Component Value Date    HGBA1C 5.8 11/02/2024     Most recent fingerstick glucose reviewed-   Recent Labs   Lab 03/01/25  1628 03/01/25  2057 03/02/25  0735 03/02/25  0947   POCTGLUCOSE 138* 118* 68* 100       Resume home meds

## 2025-03-02 NOTE — NURSING
17:00 Representative with TLSO brace at bedside. Patient was fitted for TLSO brace and per rep brace fit patient. Patient declined braces stating it did not fit him. Dr. Leung notified.     17:24 Patient now requesting TLSO brace. Rep called back and states he will come and deliver.
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
## 2025-03-02 NOTE — ASSESSMENT & PLAN NOTE
Patient has a history of UC and colectomy has been planned for 3/6  None of the stool studies were sent as patient did not have any diarrhea since admission   Low fiber diet resumed and tolerating well.

## 2025-03-02 NOTE — PLAN OF CARE
SW called pt to discuss PT/OT recs for low intensity therapy.  Per Devin, he would not like HH services set up at this time, due to procedure scheduled in three days and plans for physical rehab post-procedure.

## 2025-03-02 NOTE — PLAN OF CARE
SW called pt to discuss dc needs, specifically transportation.  Pt expressed not feeling ready for discharge, due to pain. SW explained that the provider does not find medical necessity for acute care at this point.  SW followed up with provider to inquire about MRI for pt.  Provider ordered MRI, dc pending results.    SW met pt at bedside to inform that an MRI would be scheduled, and if the findings were normal, he will still be discharged today.  SW asked pt about pain management and if there is anyone that would be able to assist him at home. Per pt, provider has prescribed medications for pain, and everyone will be at [facility].  SW acknowledged the pain management measures and used empathy while engaging with pt.  SW informed pt that a taxi could be arranged to provide transportation; per pt, he is able to use [employer] for transportation at [place].

## 2025-03-02 NOTE — ASSESSMENT & PLAN NOTE
The patients most recent potassium results are listed below.  Recent Labs     03/01/25  1133 03/01/25  1745 03/02/25  0521   K 5.9* 5.2* 4.7       Treated the hyperkalemia with Calcium gluconate, IV insulin and dextrose, and Lokelma  .   Resolved

## 2025-03-02 NOTE — ASSESSMENT & PLAN NOTE
Anemia is likely due to chronic blood loss. Most recent hemoglobin and hematocrit are listed below.  Recent Labs     02/28/25  1427 03/01/25  0447 03/02/25  0521   HGB 10.4* 9.6* 8.8*   HCT 33.6* 30.4* 29.3*       - Patient has not received any PRBC transfusions this admission  - Patient's anemia is currently stable  - Xarelto has been discontinued per discussion with patient and hematology

## 2025-03-02 NOTE — PLAN OF CARE
Patient cleared for discharge from case management standpoint.    Follow up appointments suggested and info added to AVS.    Chart and discharge orders reviewed.  Patient discharged home with no further case management needs (pt declined HH).    DC pending delivery of TLSO brace.      03/02/25 1301   Final Note   Assessment Type Final Discharge Note   Anticipated Discharge Disposition Home   Hospital Resources/Appts/Education Provided Provided patient/caregiver with written discharge plan information;Provided education on problems/symptoms using teachback   Post-Acute Status   Post-Acute Authorization Home Health   Home Health Status Patient declined/refused   Discharge Delays (!) Procedure Scheduling (IR, OR, Labs, Echo, Cath, Echo, EEG)

## 2025-03-03 ENCOUNTER — TELEPHONE (OUTPATIENT)
Dept: ADMINISTRATIVE | Facility: CLINIC | Age: 50
End: 2025-03-03
Payer: COMMERCIAL

## 2025-03-03 ENCOUNTER — PATIENT OUTREACH (OUTPATIENT)
Dept: ADMINISTRATIVE | Facility: OTHER | Age: 50
End: 2025-03-03
Payer: COMMERCIAL

## 2025-03-03 NOTE — PLAN OF CARE
Problem: Adult Inpatient Plan of Care  Goal: Plan of Care Review  Outcome: Met  Goal: Patient-Specific Goal (Individualized)  Outcome: Met  Goal: Absence of Hospital-Acquired Illness or Injury  Outcome: Met  Goal: Optimal Comfort and Wellbeing  Outcome: Met  Goal: Readiness for Transition of Care  Outcome: Met     Problem: Bariatric Environmental Safety  Goal: Safety Maintained with Care  Outcome: Met     Problem: Diabetes Comorbidity  Goal: Blood Glucose Level Within Targeted Range  Outcome: Met     Problem: Infection  Goal: Absence of Infection Signs and Symptoms  Outcome: Met     Problem: Fall Injury Risk  Goal: Absence of Fall and Fall-Related Injury  Outcome: Met     Problem: Pain Acute  Goal: Optimal Pain Control and Function  Outcome: Met
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
## 2025-03-03 NOTE — PROGRESS NOTES
CHW - Initial Contact    This Community Health Worker completed OR updated the Social Determinant of Health questionnaire with patient via telephone today.    Pt identified barriers of most importance are: Pt is requesting to go to inpatient facility due to his inability to take care of himself and he lives alone at this time. Pt is wanting an appointment to be evaluated to be placed in an inpatient facility. Can you help pt with this situation? Thanks In basket message sent to PCP.   Referrals to community agencies completed with patient/caregiver consent outside of [facility] include: no  Referrals were put through [facility] - no:   Support and Services: Placement in and inpatient facility.  Other information discussed the patient needs / wants help with: SDOH   Follow up required: yes  Follow-up Outreach - Due: 3/6/2025

## 2025-03-03 NOTE — NURSING
Prescription is currently being filled at [facility]. Patient left / discharged off unit without telling staff. TLSO brace was taken out of room along with personal belongings.

## 2025-03-03 NOTE — NURSING
Discharge instructions reviewed with and given to patient. Verbalized understanding. IV removed without difficulty, catheter intact. Awaiting prescription to be able to be filled at [facility] for patient to be able to . Dr. Leung will contact [facility].

## 2025-03-05 ENCOUNTER — PATIENT OUTREACH (OUTPATIENT)
Dept: ADMINISTRATIVE | Facility: CLINIC | Age: 50
End: 2025-03-05
Payer: COMMERCIAL

## 2025-03-05 ENCOUNTER — HOSPITAL ENCOUNTER (OUTPATIENT)
Facility: HOSPITAL | Age: 50
Discharge: HOME | End: 2025-03-07
Attending: EMERGENCY MEDICINE | Admitting: INTERNAL MEDICINE
Payer: COMMERCIAL

## 2025-03-05 DIAGNOSIS — R29.818 ACUTE FOCAL NEUROLOGICAL DEFICIT: ICD-10-CM
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
## 2025-03-05 DIAGNOSIS — K51.019 ULCERATIVE PANCOLITIS WITH COMPLICATION: ICD-10-CM

## 2025-03-05 DIAGNOSIS — I63.9 CVA (CEREBRAL VASCULAR ACCIDENT): ICD-10-CM

## 2025-03-05 DIAGNOSIS — M54.16 LUMBAR RADICULOPATHY, ACUTE: Primary | ICD-10-CM

## 2025-03-05 PROBLEM — Z78.9 FREQUENT HOSPITAL ADMISSIONS: Status: ACTIVE | Noted: 2025-03-05

## 2025-03-05 PROBLEM — R53.1 GENERALIZED WEAKNESS: Status: ACTIVE | Noted: 2025-03-05

## 2025-03-05 PROBLEM — E66.01 MORBID OBESITY: Status: ACTIVE | Noted: 2025-03-05

## 2025-03-05 LAB
ALBUMIN SERPL BCP-MCNC: 3.4 G/DL (ref 3.5–5.2)
ALP SERPL-CCNC: 63 U/L (ref 55–135)
ALT SERPL W/O P-5'-P-CCNC: 13 U/L (ref 10–44)
ANION GAP SERPL CALC-SCNC: 6 MMOL/L (ref 8–16)
AST SERPL-CCNC: 11 U/L (ref 10–40)
BACTERIA BLD CULT: NORMAL
BACTERIA BLD CULT: NORMAL
BASOPHILS # BLD AUTO: 0.05 K/UL (ref 0–0.2)
BASOPHILS NFR BLD: 0.5 % (ref 0–1.9)
BILIRUB SERPL-MCNC: 0.4 MG/DL (ref 0.1–1)
BNP SERPL-MCNC: 23 PG/ML (ref 0–99)
BUN SERPL-MCNC: 11 MG/DL (ref 6–20)
CALCIUM SERPL-MCNC: 8.4 MG/DL (ref 8.7–10.5)
CHLORIDE SERPL-SCNC: 103 MMOL/L (ref 95–110)
CHOLEST SERPL-MCNC: 132 MG/DL (ref 120–199)
CHOLEST/HDLC SERPL: 2.5 {RATIO} (ref 2–5)
CO2 SERPL-SCNC: 29 MMOL/L (ref 23–29)
CREAT SERPL-MCNC: 1 MG/DL (ref 0.5–1.4)
CREAT SERPL-MCNC: 1 MG/DL (ref 0.5–1.4)
DIFFERENTIAL METHOD BLD: ABNORMAL
EOSINOPHIL # BLD AUTO: 0.2 K/UL (ref 0–0.5)
EOSINOPHIL NFR BLD: 1.5 % (ref 0–8)
ERYTHROCYTE [DISTWIDTH] IN BLOOD BY AUTOMATED COUNT: 21 % (ref 11.5–14.5)
EST. GFR  (NO RACE VARIABLE): >60 ML/MIN/1.73 M^2
ESTIMATED AVG GLUCOSE: 105 MG/DL (ref 68–131)
GLUCOSE SERPL-MCNC: 94 MG/DL (ref 70–110)
HBA1C MFR BLD: 5.3 % (ref 4.5–6.2)
HCT VFR BLD AUTO: 29 % (ref 40–54)
HDLC SERPL-MCNC: 53 MG/DL (ref 40–75)
HDLC SERPL: 40.2 % (ref 20–50)
HGB BLD-MCNC: 8.8 G/DL (ref 14–18)
IMM GRANULOCYTES # BLD AUTO: 0.08 K/UL (ref 0–0.04)
IMM GRANULOCYTES NFR BLD AUTO: 0.8 % (ref 0–0.5)
INR PPP: 1 (ref 0.8–1.2)
LDLC SERPL CALC-MCNC: 61.6 MG/DL (ref 63–159)
LYMPHOCYTES # BLD AUTO: 1.1 K/UL (ref 1–4.8)
LYMPHOCYTES NFR BLD: 10.3 % (ref 18–48)
MCH RBC QN AUTO: 28.4 PG (ref 27–31)
MCHC RBC AUTO-ENTMCNC: 30.3 G/DL (ref 32–36)
MCV RBC AUTO: 94 FL (ref 82–98)
MONOCYTES # BLD AUTO: 1 K/UL (ref 0.3–1)
MONOCYTES NFR BLD: 9.7 % (ref 4–15)
NEUTROPHILS # BLD AUTO: 8.2 K/UL (ref 1.8–7.7)
NEUTROPHILS NFR BLD: 77.2 % (ref 38–73)
NONHDLC SERPL-MCNC: 79 MG/DL
NRBC BLD-RTO: 0 /100 WBC
OHS QRS DURATION: 74 MS
OHS QTC CALCULATION: 450 MS
PLATELET # BLD AUTO: 222 K/UL (ref 150–450)
PMV BLD AUTO: 10.2 FL (ref 9.2–12.9)
POCT GLUCOSE: 103 MG/DL (ref 70–110)
POTASSIUM SERPL-SCNC: 3.6 MMOL/L (ref 3.5–5.1)
PROT SERPL-MCNC: 6.1 G/DL (ref 6–8.4)
PROTHROMBIN TIME: 11.3 SEC (ref 9–12.5)
RBC # BLD AUTO: 3.1 M/UL (ref 4.6–6.2)
SAMPLE: NORMAL
SODIUM SERPL-SCNC: 138 MMOL/L (ref 136–145)
TRIGL SERPL-MCNC: 87 MG/DL (ref 30–150)
TROPONIN I SERPL HS-MCNC: 3.9 PG/ML (ref 0–14.9)
TSH SERPL DL<=0.005 MIU/L-ACNC: 2.04 UIU/ML (ref 0.34–5.6)
WBC # BLD AUTO: 10.59 K/UL (ref 3.9–12.7)

## 2025-03-05 PROCEDURE — 80053 COMPREHEN METABOLIC PANEL: CPT | Performed by: EMERGENCY MEDICINE

## 2025-03-05 PROCEDURE — 85610 PROTHROMBIN TIME: CPT | Performed by: EMERGENCY MEDICINE

## 2025-03-05 PROCEDURE — G0426 INPT/ED TELECONSULT50: HCPCS | Mod: GT,,, | Performed by: STUDENT IN AN ORGANIZED HEALTH CARE EDUCATION/TRAINING PROGRAM

## 2025-03-05 PROCEDURE — 83036 HEMOGLOBIN GLYCOSYLATED A1C: CPT | Performed by: INTERNAL MEDICINE

## 2025-03-05 PROCEDURE — 99285 EMERGENCY DEPT VISIT HI MDM: CPT | Mod: 25

## 2025-03-05 PROCEDURE — 36415 COLL VENOUS BLD VENIPUNCTURE: CPT | Performed by: INTERNAL MEDICINE

## 2025-03-05 PROCEDURE — 83880 ASSAY OF NATRIURETIC PEPTIDE: CPT | Performed by: EMERGENCY MEDICINE

## 2025-03-05 PROCEDURE — 80061 LIPID PANEL: CPT | Performed by: EMERGENCY MEDICINE

## 2025-03-05 PROCEDURE — 25000003 PHARM REV CODE 250: Performed by: INTERNAL MEDICINE

## 2025-03-05 PROCEDURE — 84443 ASSAY THYROID STIM HORMONE: CPT | Performed by: EMERGENCY MEDICINE

## 2025-03-05 PROCEDURE — G0378 HOSPITAL OBSERVATION PER HR: HCPCS

## 2025-03-05 PROCEDURE — 82565 ASSAY OF CREATININE: CPT

## 2025-03-05 PROCEDURE — 82962 GLUCOSE BLOOD TEST: CPT

## 2025-03-05 PROCEDURE — 63600175 PHARM REV CODE 636 W HCPCS: Performed by: INTERNAL MEDICINE

## 2025-03-05 PROCEDURE — 36415 COLL VENOUS BLD VENIPUNCTURE: CPT | Performed by: EMERGENCY MEDICINE

## 2025-03-05 PROCEDURE — 96374 THER/PROPH/DIAG INJ IV PUSH: CPT

## 2025-03-05 PROCEDURE — 93010 ELECTROCARDIOGRAM REPORT: CPT | Mod: ,,, | Performed by: INTERNAL MEDICINE

## 2025-03-05 PROCEDURE — 63600175 PHARM REV CODE 636 W HCPCS: Mod: TB,JZ

## 2025-03-05 PROCEDURE — 85025 COMPLETE CBC W/AUTO DIFF WBC: CPT | Performed by: EMERGENCY MEDICINE

## 2025-03-05 PROCEDURE — 93005 ELECTROCARDIOGRAM TRACING: CPT | Performed by: INTERNAL MEDICINE

## 2025-03-05 PROCEDURE — 25000003 PHARM REV CODE 250

## 2025-03-05 PROCEDURE — 84484 ASSAY OF TROPONIN QUANT: CPT | Performed by: EMERGENCY MEDICINE

## 2025-03-05 PROCEDURE — 25500020 PHARM REV CODE 255: Performed by: EMERGENCY MEDICINE

## 2025-03-05 PROCEDURE — 96375 TX/PRO/DX INJ NEW DRUG ADDON: CPT

## 2025-03-05 PROCEDURE — 96372 THER/PROPH/DIAG INJ SC/IM: CPT | Performed by: INTERNAL MEDICINE

## 2025-03-05 RX ORDER — SODIUM,POTASSIUM PHOSPHATES 280-250MG
2 POWDER IN PACKET (EA) ORAL
Status: DISCONTINUED | OUTPATIENT
Start: 2025-03-05 | End: 2025-03-07 | Stop reason: HOSPADM

## 2025-03-05 RX ORDER — OXYCODONE HYDROCHLORIDE 10 MG/1
10 TABLET ORAL EVERY 6 HOURS PRN
Refills: 0 | Status: DISCONTINUED | OUTPATIENT
Start: 2025-03-05 | End: 2025-03-07 | Stop reason: HOSPADM

## 2025-03-05 RX ORDER — ESCITALOPRAM OXALATE 10 MG/1
20 TABLET ORAL DAILY
Status: DISCONTINUED | OUTPATIENT
Start: 2025-03-06 | End: 2025-03-07 | Stop reason: HOSPADM

## 2025-03-05 RX ORDER — ATORVASTATIN CALCIUM 40 MG/1
40 TABLET, FILM COATED ORAL DAILY
Status: DISCONTINUED | OUTPATIENT
Start: 2025-03-05 | End: 2025-03-07 | Stop reason: HOSPADM

## 2025-03-05 RX ORDER — PREGABALIN 25 MG/1
50 CAPSULE ORAL 2 TIMES DAILY
Status: DISCONTINUED | OUTPATIENT
Start: 2025-03-05 | End: 2025-03-07 | Stop reason: HOSPADM

## 2025-03-05 RX ORDER — SODIUM CHLORIDE, SODIUM LACTATE, POTASSIUM CHLORIDE, CALCIUM CHLORIDE 600; 310; 30; 20 MG/100ML; MG/100ML; MG/100ML; MG/100ML
1000 INJECTION, SOLUTION INTRAVENOUS
Status: COMPLETED | OUTPATIENT
Start: 2025-03-05 | End: 2025-03-05

## 2025-03-05 RX ORDER — CARISOPRODOL 350 MG/1
350 TABLET ORAL 3 TIMES DAILY PRN
Refills: 0 | Status: DISCONTINUED | OUTPATIENT
Start: 2025-03-05 | End: 2025-03-07

## 2025-03-05 RX ORDER — DIAZEPAM 5 MG/1
5 TABLET ORAL DAILY PRN
Status: DISCONTINUED | OUTPATIENT
Start: 2025-03-05 | End: 2025-03-07 | Stop reason: HOSPADM

## 2025-03-05 RX ORDER — HYDROMORPHONE HYDROCHLORIDE 1 MG/ML
0.5 INJECTION, SOLUTION INTRAMUSCULAR; INTRAVENOUS; SUBCUTANEOUS
Status: COMPLETED | OUTPATIENT
Start: 2025-03-05 | End: 2025-03-05

## 2025-03-05 RX ORDER — LANOLIN ALCOHOL/MO/W.PET/CERES
800 CREAM (GRAM) TOPICAL
Status: DISCONTINUED | OUTPATIENT
Start: 2025-03-05 | End: 2025-03-07 | Stop reason: HOSPADM

## 2025-03-05 RX ORDER — BISACODYL 10 MG/1
10 SUPPOSITORY RECTAL DAILY PRN
Status: DISCONTINUED | OUTPATIENT
Start: 2025-03-05 | End: 2025-03-07 | Stop reason: HOSPADM

## 2025-03-05 RX ORDER — PANTOPRAZOLE SODIUM 40 MG/1
40 TABLET, DELAYED RELEASE ORAL 2 TIMES DAILY
Status: DISCONTINUED | OUTPATIENT
Start: 2025-03-05 | End: 2025-03-07 | Stop reason: HOSPADM

## 2025-03-05 RX ORDER — ASPIRIN 81 MG/1
81 TABLET ORAL DAILY
Status: DISCONTINUED | OUTPATIENT
Start: 2025-03-05 | End: 2025-03-07 | Stop reason: HOSPADM

## 2025-03-05 RX ORDER — BUSPIRONE HYDROCHLORIDE 5 MG/1
15 TABLET ORAL 3 TIMES DAILY
Status: DISCONTINUED | OUTPATIENT
Start: 2025-03-05 | End: 2025-03-07 | Stop reason: HOSPADM

## 2025-03-05 RX ORDER — ENOXAPARIN SODIUM 100 MG/ML
40 INJECTION SUBCUTANEOUS EVERY 24 HOURS
Status: DISCONTINUED | OUTPATIENT
Start: 2025-03-05 | End: 2025-03-06

## 2025-03-05 RX ORDER — MORPHINE SULFATE 2 MG/ML
2 INJECTION, SOLUTION INTRAMUSCULAR; INTRAVENOUS
Refills: 0 | Status: DISCONTINUED | OUTPATIENT
Start: 2025-03-05 | End: 2025-03-07

## 2025-03-05 RX ORDER — SODIUM CHLORIDE 0.9 % (FLUSH) 0.9 %
10 SYRINGE (ML) INJECTION
Status: DISCONTINUED | OUTPATIENT
Start: 2025-03-05 | End: 2025-03-07 | Stop reason: HOSPADM

## 2025-03-05 RX ORDER — ASPIRIN 325 MG
325 TABLET ORAL
Status: COMPLETED | OUTPATIENT
Start: 2025-03-05 | End: 2025-03-05

## 2025-03-05 RX ORDER — ZOLPIDEM TARTRATE 5 MG/1
10 TABLET ORAL NIGHTLY PRN
Status: DISCONTINUED | OUTPATIENT
Start: 2025-03-05 | End: 2025-03-07 | Stop reason: HOSPADM

## 2025-03-05 RX ADMIN — CARISOPRODOL 350 MG: 350 TABLET ORAL at 10:03

## 2025-03-05 RX ADMIN — MORPHINE SULFATE 2 MG: 2 INJECTION, SOLUTION INTRAMUSCULAR; INTRAVENOUS at 11:03

## 2025-03-05 RX ADMIN — PREGABALIN 50 MG: 25 CAPSULE ORAL at 07:03

## 2025-03-05 RX ADMIN — ZOLPIDEM TARTRATE 10 MG: 5 TABLET, COATED ORAL at 11:03

## 2025-03-05 RX ADMIN — ATORVASTATIN CALCIUM 40 MG: 40 TABLET, FILM COATED ORAL at 04:03

## 2025-03-05 RX ADMIN — ASPIRIN 325 MG ORAL TABLET 325 MG: 325 PILL ORAL at 01:03

## 2025-03-05 RX ADMIN — PANTOPRAZOLE SODIUM 40 MG: 40 TABLET, DELAYED RELEASE ORAL at 07:03

## 2025-03-05 RX ADMIN — OXYCODONE HYDROCHLORIDE 10 MG: 10 TABLET ORAL at 07:03

## 2025-03-05 RX ADMIN — ASPIRIN 81 MG: 81 TABLET, COATED ORAL at 04:03

## 2025-03-05 RX ADMIN — HYDROMORPHONE HYDROCHLORIDE 0.5 MG: 1 INJECTION, SOLUTION INTRAMUSCULAR; INTRAVENOUS; SUBCUTANEOUS at 02:03

## 2025-03-05 RX ADMIN — IOHEXOL 100 ML: 350 INJECTION, SOLUTION INTRAVENOUS at 01:03

## 2025-03-05 RX ADMIN — BUSPIRONE HYDROCHLORIDE 15 MG: 5 TABLET ORAL at 07:03

## 2025-03-05 RX ADMIN — ENOXAPARIN SODIUM 40 MG: 40 INJECTION SUBCUTANEOUS at 05:03

## 2025-03-05 RX ADMIN — SODIUM CHLORIDE, POTASSIUM CHLORIDE, SODIUM LACTATE AND CALCIUM CHLORIDE 1000 ML: 600; 310; 30; 20 INJECTION, SOLUTION INTRAVENOUS at 02:03

## 2025-03-05 NOTE — ED PROVIDER NOTES
Encounter Date: 3/5/2025       History     Chief Complaint   Patient presents with    Numbness     Left arm and leg tingling started yesterday at 3-4pm patient reports having difficulty talking.  Also reports having a hard time texting today     HPI    50-year-old male past medical history DVT on Xarelto, hypertension, and hyperlipidemia, UC, diabetes presents to the emergency department left upper extremity numbness and tingling that started yesterday around 3:00 p.m. states that this morning in noticing some difficulty speaking with difficulty texting today which prompted come in.  Patient noted to have slurring speech, sleepy at yet arousable with verbal stimuli.  Patient reports last took Xarelto on Friday.  States that been having generalized malaise and weakness with decreased p.o. intake ongoing for the last 1-2 weeks    Review of patient's allergies indicates:  No Known Allergies  Past Medical History:   Diagnosis Date    C. difficile colitis     Cavitary pneumonia 11/2023    pos aspergillus serology    Diabetes mellitus 01/2022    Hyperlipidemia 2015    Hypertension 2015    Insomnia 2015    Ulcerative colitis      Past Surgical History:   Procedure Laterality Date    BRONCHOSCOPY WITH FLUOROSCOPY Left 11/20/2023    Procedure: BRONCHOSCOPY, WITH FLUOROSCOPY;  Surgeon: Lisa Villarreal MD;  Location: [facility];  Service: Pulmonary;  Laterality: Left;    BRONCHOSCOPY WITH FLUOROSCOPY N/A 11/30/2023    Procedure: BRONCHOSCOPY, WITH FLUOROSCOPY;  Surgeon: Lisa Villarreal MD;  Location: [facility];  Service: Pulmonary;  Laterality: N/A;    CARDIAC ELECTROPHYSIOLOGY MAPPING AND ABLATION  2017    SVT    CHOLECYSTECTOMY  2011    COLONOSCOPY N/A 5/3/2022    Procedure: COLONOSCOPY;  Surgeon: Shan Jean III, MD;  Location: [facility];  Service: Endoscopy;  Laterality: N/A;    COLONOSCOPY N/A 3/16/2024    Procedure: COLONOSCOPY;  Surgeon: ALEKSANDER Ramos MD;  Location: [facility];  Service: Endoscopy;   Laterality: N/A;    COLONOSCOPY N/A 1/17/2025    Procedure: COLONOSCOPY;  Surgeon: Russ Rowe MD;  Location: [facility];  Service: Endoscopy;  Laterality: N/A;    COLONOSCOPY WITH FECAL MICROBIOTA TRANSFER N/A 3/21/2023    Procedure: COLONOSCOPY, WITH FECAL MICROBIOTA TRANSFER;  Surgeon: David Millan MD;  Location: [facility];  Service: Endoscopy;  Laterality: N/A;    ESOPHAGOGASTRODUODENOSCOPY N/A 4/24/2023    Procedure: EGD (ESOPHAGOGASTRODUODENOSCOPY);  Surgeon: Shan Jean III, MD;  Location: [facility];  Service: Endoscopy;  Laterality: N/A;    ESOPHAGOGASTRODUODENOSCOPY N/A 6/5/2024    Procedure: EGD (ESOPHAGOGASTRODUODENOSCOPY);  Surgeon: Odalis Mccabe MD;  Location: [facility];  Service: Endoscopy;  Laterality: N/A;    FLEXIBLE SIGMOIDOSCOPY N/A 6/5/2024    Procedure: SIGMOIDOSCOPY, FLEXIBLE;  Surgeon: Odalis Mccabe MD;  Location: [facility];  Service: Endoscopy;  Laterality: N/A;    FLEXIBLE SIGMOIDOSCOPY N/A 7/16/2024    Procedure: SIGMOIDOSCOPY, FLEXIBLE;  Surgeon: Shan Jean III, MD;  Location: [facility];  Service: Endoscopy;  Laterality: N/A;    FLEXIBLE SIGMOIDOSCOPY N/A 8/13/2024    Procedure: SIGMOIDOSCOPY, FLEXIBLE;  Surgeon: Shan Jean III, MD;  Location: [facility];  Service: Endoscopy;  Laterality: N/A;    GANGLION CYST EXCISION Left 1992    UMBILICAL HERNIA REPAIR  2011    with mesh     Family History   Problem Relation Name Age of Onset    Asthma Mother       Social History[1]  Review of Systems  See HPI above  Physical Exam     Initial Vitals [03/05/25 1307]   BP Pulse Resp Temp SpO2   123/81 (!) 116 20 98.2 °F (36.8 °C) 95 %      MAP       --         Physical Exam    Nursing note and vitals reviewed.  Constitutional: He appears ill (Chronically).   HENT:   Head: Normocephalic and atraumatic.   Poor dentition   Eyes: EOM are normal. Pupils are equal, round, and reactive to light.   Cardiovascular:  Regular rhythm.   Tachycardia present.          Pulmonary/Chest: Breath sounds normal. He has no wheezes. He has no rales.   Abdominal: He exhibits no distension. There is no abdominal tenderness.   Protuberant abdomen There is no rebound and no guarding.     Neurological: No cranial nerve deficit or sensory deficit. GCS eye subscore is 4. GCS verbal subscore is 5. GCS motor subscore is 6.   Fatigue yet arousable with tactile stimuli.  Cranial nerves 2-12 were intact.  Sensation intact equal throughout.  5/5  strength bilaterally.  5/5 strength noted in bilateral lower extremities.  Staggered yet steady gait on exam.         ED Course   Procedures  Labs Reviewed   CBC W/ AUTO DIFFERENTIAL - Abnormal       Result Value    WBC 10.59      RBC 3.10 (*)     Hemoglobin 8.8 (*)     Hematocrit 29.0 (*)     MCV 94      MCH 28.4      MCHC 30.3 (*)     RDW 21.0 (*)     Platelets 222      MPV 10.2      Immature Granulocytes 0.8 (*)     Gran # (ANC) 8.2 (*)     Immature Grans (Abs) 0.08 (*)     Lymph # 1.1      Mono # 1.0      Eos # 0.2      Baso # 0.05      nRBC 0      Gran % 77.2 (*)     Lymph % 10.3 (*)     Mono % 9.7      Eosinophil % 1.5      Basophil % 0.5      Differential Method Automated     COMPREHENSIVE METABOLIC PANEL   PROTIME-INR   TSH   LIPID PANEL   TROPONIN I HIGH SENSITIVITY   B-TYPE NATRIURETIC PEPTIDE   POCT GLUCOSE    POCT Glucose 103     ISTAT CREATININE    POC Creatinine 1.0      Sample VENOUS     POCT GLUCOSE MONITORING CONTINUOUS          Imaging Results              X-Ray Chest AP Portable (In process)  Result time 03/05/25 14:26:14                     CTA Head and Neck (xpd) (Final result)  Result time 03/05/25 13:54:03      Final result by Consuelo Shea MD (03/05/25 13:54:03)                   Impression:      Normal CTA of the head and neck    Minimal calcified plaque at the carotid bifurcations right greater than left      Electronically signed by: Consuelo Shea  Date:    03/05/2025  Time:    13:54               Narrative:     EXAMINATION:  CTA HEAD AND NECK (XPD)    CLINICAL HISTORY:  Neuro deficit, acute, stroke suspected;    TECHNIQUE:  Non contrast low dose axial images were obtained through the head. CT angiogram was performed from the level of the greg to the top of the head following the IV administration of 100mL of Omnipaque 350.   Sagittal and coronal reconstructions and maximum intensity projection reconstructions were performed. Arterial stenosis percentages are based on NASCET measurement criteria.    COMPARISON:  11/01/2024    FINDINGS:  Extracranial:    Aorta and great vessels: Left-sided aortic arch with normal configuration.   No evidence of stenosis of the origins of the great vessels from the arch.    Subclavian arteries: The subclavian arteries are without hemodynamically significant stenosis or occlusion.    Right carotid: The right common carotid artery is without significant stenosis. Calcified plaque at the right carotid bulb the right internal carotid artery is without significant stenosis, occlusion, or dissection.    Left carotid: The left common carotid artery is without significant stenosis. Minimal calcified plaque at the bifurcation the left internal carotid artery is without significant stenosis, occlusion, or dissection.    Extracranial vertebral arteries: Left vertebral artery is dominant the vertebral arteries are without significant stenosis, occlusion, or dissection to the skull base.    Intracranial:    Anterior circulation: Mild calcified plaque in the cavernous carotid arteries no areas of significant atherosclerotic narrowing or filling defects are identified.  The middle cerebral arteries are normal.  The anterior cerebral arteries are normal.    Posterior circulation: The vertebral arteries are normal. The basilar artery is normal. The posterior cerebral arteries are normal.    No evidence of aneurysm or arteriovenous malformation.    Dural venous sinuses are patent.    Other:    Paraspinous  soft tissues are normal.  There is no adenopathy.    The visualized portions of the lung apices are unremarkable.    There are degenerative spine changes. No concerning osseous lesions identified.                                       CT HEAD FOR STROKE (Final result)  Result time 03/05/25 13:28:31      Final result by Consuelo Shea MD (03/05/25 13:28:31)                   Impression:      No acute intracranial process    These findings were called to Dr. Broderick in the emergency department at 13:28      Electronically signed by: Consuelo Shea  Date:    03/05/2025  Time:    13:28               Narrative:    CLINICAL HISTORY:  ([id number])51 y/o  (1975) M    Neuro deficit, acute, stroke suspected;    TECHNIQUE:  (A#[id number], exam time 3/5/2025 13:23)    CT HEAD FOR STROKE [id number]    Axial CT of the brain without contrast using soft tissue and bone algorithm. None.    CMS MANDATED QUALITY DATA - CT RADIATION - 436    All CT scans at this facility utilize dose modulation, iterative reconstruction, and/or weight based dosing when appropriate to reduce radiation dose to as low as reasonably achievable.    COMPARISON:  MRI brain dated 11/01/2024    FINDINGS:  No acute intracranial hemorrhage, edema or mass effect, and no acute parenchymal abnormality. There is no hydrocephalus, herniation or midline shift, and the basal and suprasellar cisterns are within normal limits. The osseous structures show no acute skull fracture. The ventricles and sulci are normal. There is normal gray white differentiation. Orbital contents appear within normal limits. External auditory canals are unremarkable. The visualized paranasal sinuses and mastoid air cells are essentially clear.                                       Medications   iohexoL (OMNIPAQUE 350) injection 100 mL (100 mLs Intravenous Given 3/5/25 1332)   aspirin tablet 325 mg (325 mg Oral Given 3/5/25 1359)   HYDROmorphone injection 0.5 mg (0.5 mg Intravenous Given  3/5/25 1424)   lactated ringers infusion (1,000 mLs Intravenous New Bag 3/5/25 1426)     Medical Decision Making  50-year-old male insert emergency department with left upper extremity numbness.  Last known well today 3:00 p.m..  Gradually progress to have at patient and difficulty texting.  Vitals notable for tachycardia.  Point of care glucose 103.  Van negative. NIH scale 1.  Patient out of window for TNK; aspirin ordered. We will obtain stat CT head and CTA to assess for LVO as patient is within window for thrombectomy.     Update:  CTA head and neck negative for large vessel occlusion.  Neurology recommended MRI brain which is ordered.  Given patient's age and multiple comorbidities will admit patient for further stroke workup and medical optimization.    Problems Addressed:  Acute focal neurological deficit: acute illness or injury with systemic symptoms    Amount and/or Complexity of Data Reviewed  Labs: ordered. Decision-making details documented in ED Course.  Radiology: ordered. Decision-making details documented in ED Course.    Risk  OTC drugs.  Prescription drug management.  Parenteral controlled substances.  Decision regarding hospitalization.               ED Course as of 03/05/25 1427   Wed Mar 05, 2025   1324 POCT Glucose: 103 [CA]   1333 CT HEAD FOR STROKE  No acute intracranial bleed on my independent interpretation. [CA]   1358 NIH score of 1; teleneurologist evaluated the patient.  No findings recommended MRI to further assess. [CA]   1359 CTA Head and Neck (xpd)  No LVO, mild calcific plaques on the right carotid without stenosis [CA]      ED Course User Index  [CA] Prashant Luong MD                           Clinical Impression:  Final diagnoses:  [R29.818] Acute focal neurological deficit          ED Disposition Condition    Observation                   [1]   Social History  Tobacco Use    Smoking status: Former     Average packs/day: 1 pack/day for 30.0 years (30.0 ttl pk-yrs)     Types:  Cigarettes     Start date: 1993    Smokeless tobacco: Never    Tobacco comments:     Pt states he has stopped smoking with Chantix three weeks ago. 12/1/23   Substance Use Topics    Alcohol use: Yes     Comment: ocass    Drug use: Not Currently        Prashant Luong MD  Resident  03/05/25 5930

## 2025-03-05 NOTE — TELEMEDICINE CONSULT
[facility]  Vascular Neurology  Comprehensive Stroke Center  TeleVascular Neurology Acute Consultation Note        Consult Information  Consult to Telemedicine - Acute Stroke  Consult performed by: Rubi Petersen MD  Consult ordered by: Kathy Broderick MD          Consulting Provider: KATHY BRODERICK   Current Providers  No providers found    Patient Location:  [facility] EMERGENCY DEPARTMENT Emergency Department    Spoke hospital nurse at bedside with patient assisting consultant.  Patient information was obtained from patient.       Stroke Documentation  Acute Stroke Times   Last Known Normal Date: 03/04/25  Last Known Normal Time: 1500  Symptom Onset Date: 03/04/25  Symptom Onset Time: 1500  Stroke Team Called Date: 03/05/25  Stroke Team Called Time: 1328  Stroke Team Arrival Date: 03/05/25  Stroke Team Arrival Time: 1328  CT Interpretation Time: 1328  Thrombolytic Therapy Recommended: No    NIH Scale:  Interval: baseline  1a. Level of Consciousness: 0-->Alert, keenly responsive  1b. LOC Questions: 0-->Answers both questions correctly  1c. LOC Commands: 0-->Performs both tasks correctly  2. Best Gaze: 0-->Normal  3. Visual: 0-->No visual loss  4. Facial Palsy: 0-->Normal symmetrical movements  5a. Motor Arm, Left: 0-->No drift, limb holds 90 (or 45) degrees for full 10 secs  5b. Motor Arm, Right: 0-->No drift, limb holds 90 (or 45) degrees for full 10 secs  6a. Motor Leg, Left: 0-->No drift, leg holds 30 degree position for full 5 secs  6b. Motor Leg, Right: 0-->No drift, leg holds 30 degree position for full 5 secs  7. Limb Ataxia: 0-->Absent  8. Sensory: 0-->Normal, no sensory loss  9. Best Language: 0-->No aphasia, normal  10. Dysarthria: 0-->Normal  11. Extinction and Inattention (formerly Neglect): 0-->No abnormality  Total (NIH Stroke Scale): 0      Modified Eliza: Score: 0  Baker Coma Scale:     ABCD2 Score:    [id number] Score:    HAS -BLED Score:    ICH Score:    Hunt & Souza  "Classification:      Blood pressure 137/67, pulse 103, temperature 98.2 °F (36.8 °C), temperature source Oral, resp. rate 18, height 5' 2" (1.575 m), weight 135.8 kg (299 lb 4.8 oz), SpO2 96%.      In my opinion, this was a: Tier 1; VAN Stroke Assessment: Negative     Medical Decision Making  HPI:  50 y.o. male with a PMHx significant for type II DM, obesity, ulcerative colitis, h/o DVT, HTN, HLD, recent fracture of T12 and L1 (non-operative currently) presenting with left arm/leg weakness and tingling and speech difficulties. LKN yesterday around 1500.     Patient reports that he feels weak, tired, has difficulty talking, and feels confused. Weakness involving the left side started yesterday; other symptoms noticed this morning.     Vitals:    03/05/25 1307   BP: 123/81   Pulse: (!) 116   Resp: 20   Temp: 98.2 °F (36.8 °C)          Images personally reviewed and interpreted:  Study: Head CT and CTA Head & Neck  Study Interpretation: No acute intracranial hemorrhage.      Assessment and plan:  # Left-sided weakness and numbness  - No clear focal deficits appreciated on exam. He is not a candidate for IV thrombolysis given last known well, mild deficits. No LVO on CTA. Plan for follow-up MRI brain without contrast to evaluate for acute ischemia.     Lytics recommendation: Thrombolytic therapy not recommended due to Outside of treatment window   Thrombectomy recommendation: No; No large vessel occlusion identified on imaging   Placement recommendation: pending further studies     - Recommend follow-up non-urgent MRI brain without contrast to evaluate for acute ischemia. If no evidence of acute infarction (with persistent symptoms), then it is unlikely to be 2/2 a neurovascular etiology. As such, no further work-up from a stroke perspective is indicated.   - If above studies are abnormal, please load images to [facility] imaging system and contact us to review.    Please contact us with any further questions " or concerns or if patient has any acute neurological changes (new symptoms, worsening deficits).           ROS  Physical Exam  Past Medical History:   Diagnosis Date    C. difficile colitis     Cavitary pneumonia 11/2023    pos aspergillus serology    Diabetes mellitus 01/2022    Hyperlipidemia 2015    Hypertension 2015    Insomnia 2015    Ulcerative colitis      Past Surgical History:   Procedure Laterality Date    BRONCHOSCOPY WITH FLUOROSCOPY Left 11/20/2023    Procedure: BRONCHOSCOPY, WITH FLUOROSCOPY;  Surgeon: Lisa Villarreal MD;  Location: [facility];  Service: Pulmonary;  Laterality: Left;    BRONCHOSCOPY WITH FLUOROSCOPY N/A 11/30/2023    Procedure: BRONCHOSCOPY, WITH FLUOROSCOPY;  Surgeon: Lisa Villarreal MD;  Location: [facility];  Service: Pulmonary;  Laterality: N/A;    CARDIAC ELECTROPHYSIOLOGY MAPPING AND ABLATION  2017    SVT    CHOLECYSTECTOMY  2011    COLONOSCOPY N/A 5/3/2022    Procedure: COLONOSCOPY;  Surgeon: Shan Jean III, MD;  Location: [facility];  Service: Endoscopy;  Laterality: N/A;    COLONOSCOPY N/A 3/16/2024    Procedure: COLONOSCOPY;  Surgeon: ALEKSANDER Ramos MD;  Location: [facility];  Service: Endoscopy;  Laterality: N/A;    COLONOSCOPY N/A 1/17/2025    Procedure: COLONOSCOPY;  Surgeon: Russ Rowe MD;  Location: [facility];  Service: Endoscopy;  Laterality: N/A;    COLONOSCOPY WITH FECAL MICROBIOTA TRANSFER N/A 3/21/2023    Procedure: COLONOSCOPY, WITH FECAL MICROBIOTA TRANSFER;  Surgeon: David Millan MD;  Location: [facility];  Service: Endoscopy;  Laterality: N/A;    ESOPHAGOGASTRODUODENOSCOPY N/A 4/24/2023    Procedure: EGD (ESOPHAGOGASTRODUODENOSCOPY);  Surgeon: Shan Jean III, MD;  Location: [facility];  Service: Endoscopy;  Laterality: N/A;    ESOPHAGOGASTRODUODENOSCOPY N/A 6/5/2024    Procedure: EGD (ESOPHAGOGASTRODUODENOSCOPY);  Surgeon: Odalis Mccabe MD;  Location: [facility];  Service: Endoscopy;  Laterality: N/A;    FLEXIBLE  SIGMOIDOSCOPY N/A 6/5/2024    Procedure: SIGMOIDOSCOPY, FLEXIBLE;  Surgeon: Odalis Mccabe MD;  Location: [facility]O;  Service: Endoscopy;  Laterality: N/A;    FLEXIBLE SIGMOIDOSCOPY N/A 7/16/2024    Procedure: SIGMOIDOSCOPY, FLEXIBLE;  Surgeon: Shan Jean III, MD;  Location: [facility] ENDO;  Service: Endoscopy;  Laterality: N/A;    FLEXIBLE SIGMOIDOSCOPY N/A 8/13/2024    Procedure: SIGMOIDOSCOPY, FLEXIBLE;  Surgeon: Shan Jean III, MD;  Location: [facility] ENDO;  Service: Endoscopy;  Laterality: N/A;    GANGLION CYST EXCISION Left 1992    UMBILICAL HERNIA REPAIR  2011    with mesh     Family History   Problem Relation Name Age of Onset    Asthma Mother         Diagnoses  Problem Noted   Generalized Weakness 3/5/2025       Rubi Petersen MD      Emergent/Acute neurological consultation requested by spoke provider due to critical concerns for possible cerebrovascular event that could result in permanent loss of neurologic/bodily function, severe disability or death of this patient.  Immediate/timely evaluation by a highly prepared expert is paramount for optimal outcomes  High risk for neurological deterioration if not properly diagnosed  High risk for neurological deterioration if not treated promplty/as soon as possible  Complex diagnostic evaluation may be required (advanced imaging)  High risk treatment options (thrombolytics and/or thrombectomy)    Patient care was coordinated with spoke provider, including but not limted to    Discussing likely diagnosis/etiology of symptoms  Making recommendations for further diagnostic studies  Making recommendations for intravenous thrombolytics or other advanced therapies  Making recommendations for disposition (admission/transfer for higher level of care)      Neurology consultation requested by spoke provider. Audiovisual encounter with the patient performed using a secure connection.  Results and impressions from the visit are documented on this note and were  communicated to the consulting provider/team via direct communication. The note has been shared for addition to the patients electronic medical record.

## 2025-03-05 NOTE — SUBJECTIVE & OBJECTIVE
HPI:  50 y.o. male with a PMHx significant for type II DM, obesity, ulcerative colitis, h/o DVT, HTN, HLD, recent fracture of T12 and L1 (non-operative currently) presenting with left arm/leg weakness and tingling and speech difficulties. LKN yesterday around 1500.     Patient reports that he feels weak, tired, has difficulty talking, and feels confused. Weakness involving the left side started yesterday; other symptoms noticed this morning.     Vitals:    03/05/25 1307   BP: 123/81   Pulse: (!) 116   Resp: 20   Temp: 98.2 °F (36.8 °C)          Images personally reviewed and interpreted:  Study: Head CT and CTA Head & Neck  Study Interpretation: No acute intracranial hemorrhage.      Assessment and plan:  # Left-sided weakness and numbness  - No clear focal deficits appreciated on exam. He is not a candidate for IV thrombolysis given last known well, mild deficits. No LVO on CTA. Plan for follow-up MRI brain without contrast to evaluate for acute ischemia.     Lytics recommendation: Thrombolytic therapy not recommended due to Outside of treatment window   Thrombectomy recommendation: No; No large vessel occlusion identified on imaging   Placement recommendation: pending further studies     - Recommend follow-up non-urgent MRI brain without contrast to evaluate for acute ischemia. If no evidence of acute infarction (with persistent symptoms), then it is unlikely to be 2/2 a neurovascular etiology. As such, no further work-up from a stroke perspective is indicated.   - If above studies are abnormal, please load images to teleneurology imaging system and contact us to review.    Please contact us with any further questions or concerns or if patient has any acute neurological changes (new symptoms, worsening deficits).

## 2025-03-05 NOTE — PROGRESS NOTES
C3 nurse attempted to contact Jacoby Jean-Baptiste  for a TCC post hospital discharge follow up call. No answer. Left voicemail with callback information. The patient has a scheduled HOSFU appointment with RAYMUNDO gaxiola  on 03/05/2025 @ 1300.     Message sent to PCP staff.

## 2025-03-05 NOTE — PT/OT/SLP PROGRESS
Occupational Therapy      Patient Name:  Jacoby Jean-Baptiste   MRN:  [id number]    Patient not seen today secondary to  (No H&P on chart). Will follow-up 3/6/2025.    3/5/2025

## 2025-03-05 NOTE — H&P
[facility]  [facility]  History & Physical    Patient Name: Jacoby Jean-Baptiste  MRN: [id number]  Patient Class: OP- Observation  Admission Date: 3/5/2025  Attending Physician: Noe Juarez MD   Primary Care Provider: Hunter Aguilar III, MD         Patient information was obtained from patient, past medical records, ER records, and ER MD  .     Subjective:     Principal Problem:Acute focal neurological deficit    Chief Complaint:   Chief Complaint   Patient presents with    Numbness     Left arm and leg tingling started yesterday at 3-4pm patient reports having difficulty talking.  Also reports having a hard time texting today        HPI: 50 year old pt getting admitted with suspected TIA/CVA  Pt started having  left arm/leg weakness and tingling and speech difficulties yesterday evening   Also he noticed he has some difficulties texting on smart p[gerard  Symptoms persisted today, came to ER and got admitted       Past Medical History:   Diagnosis Date    C. difficile colitis     Cavitary pneumonia 11/2023    pos aspergillus serology    Diabetes mellitus 01/2022    Hyperlipidemia 2015    Hypertension 2015    Insomnia 2015    Ulcerative colitis        Past Surgical History:   Procedure Laterality Date    BRONCHOSCOPY WITH FLUOROSCOPY Left 11/20/2023    Procedure: BRONCHOSCOPY, WITH FLUOROSCOPY;  Surgeon: Lisa Villarreal MD;  Location: [facility];  Service: Pulmonary;  Laterality: Left;    BRONCHOSCOPY WITH FLUOROSCOPY N/A 11/30/2023    Procedure: BRONCHOSCOPY, WITH FLUOROSCOPY;  Surgeon: Lisa Villarreal MD;  Location: [facility];  Service: Pulmonary;  Laterality: N/A;    CARDIAC ELECTROPHYSIOLOGY MAPPING AND ABLATION  2017    SVT    CHOLECYSTECTOMY  2011    COLONOSCOPY N/A 5/3/2022    Procedure: COLONOSCOPY;  Surgeon: Shan Jean III, MD;  Location: [facility];  Service: Endoscopy;  Laterality: N/A;    COLONOSCOPY N/A 3/16/2024    Procedure: COLONOSCOPY;  Surgeon:  ALEKSANDER Ramos MD;  Location: [facility];  Service: Endoscopy;  Laterality: N/A;    COLONOSCOPY N/A 1/17/2025    Procedure: COLONOSCOPY;  Surgeon: Russ Rowe MD;  Location: [facility];  Service: Endoscopy;  Laterality: N/A;    COLONOSCOPY WITH FECAL MICROBIOTA TRANSFER N/A 3/21/2023    Procedure: COLONOSCOPY, WITH FECAL MICROBIOTA TRANSFER;  Surgeon: David Millan MD;  [facility];  Service: Endoscopy;  Laterality: N/A;    ESOPHAGOGASTRODUODENOSCOPY N/A 4/24/2023    Procedure: EGD (ESOPHAGOGASTRODUODENOSCOPY);  Surgeon: Shan Jean III, MD;  Location: [facility];  Service: Endoscopy;  Laterality: N/A;    ESOPHAGOGASTRODUODENOSCOPY N/A 6/5/2024    Procedure: EGD (ESOPHAGOGASTRODUODENOSCOPY);  Surgeon: Odalis Mccabe MD;  Location: [facility];  Service: Endoscopy;  Laterality: N/A;    FLEXIBLE SIGMOIDOSCOPY N/A 6/5/2024    Procedure: SIGMOIDOSCOPY, FLEXIBLE;  Surgeon: Odalis Mccabe MD;  Location: [facility];  Service: Endoscopy;  Laterality: N/A;    FLEXIBLE SIGMOIDOSCOPY N/A 7/16/2024    Procedure: SIGMOIDOSCOPY, FLEXIBLE;  Surgeon: Shan Jean III, MD;  Location: [facility];  Service: Endoscopy;  Laterality: N/A;    FLEXIBLE SIGMOIDOSCOPY N/A 8/13/2024    Procedure: SIGMOIDOSCOPY, FLEXIBLE;  Surgeon: Shan Jean III, MD;  Location: [facility];  Service: Endoscopy;  Laterality: N/A;    GANGLION CYST EXCISION Left 1992    UMBILICAL HERNIA REPAIR  2011    with mesh       Review of patient's allergies indicates:  No Known Allergies    Current Facility-Administered Medications on File Prior to Encounter   Medication    lactated ringers infusion     Current Outpatient Medications on File Prior to Encounter   Medication Sig    busPIRone (BUSPAR) 15 MG tablet Take 1 tablet (15 mg total) by mouth 3 (three) times daily.    diazePAM (VALIUM) 5 MG tablet Take 1 tablet (5 mg total) by mouth daily as needed for Anxiety.    ergocalciferol (ERGOCALCIFEROL) 50,000 unit Cap  Take 1 capsule by mouth every Sunday.    EScitalopram oxalate (LEXAPRO) 20 MG tablet Take 1 tablet (20 mg total) by mouth once daily.    metoprolol succinate (TOPROL-XL) 50 MG 24 hr tablet Take 1 tablet (50 mg total) by mouth once daily.    oxyCODONE (ROXICODONE) 10 mg Tab immediate release tablet Take 1 tablet (10 mg total) by mouth every 6 (six) hours as needed for Pain.    pantoprazole (PROTONIX) 40 MG tablet Take 40 mg by mouth 2 (two) times daily.    pregabalin (LYRICA) 50 MG capsule Take 1 capsule by mouth 2 (two) times daily.    simvastatin (ZOCOR) 20 MG tablet Take 1 tablet (20 mg total) by mouth every evening.    tofacitinib (XELJANZ) 10 mg Tab Take 10 mg by mouth 2 (two) times daily.    zolpidem (AMBIEN) 10 mg Tab Take 1 tablet (10 mg total) by mouth nightly as needed (insomnia).    budesonide (ENTOCORT EC) 3 mg capsule Take 3 capsules (9 mg total) by mouth once daily. (Patient not taking: Reported on 3/5/2025)    Lactobac 2-Bifido 1-S. therm (VSL#3) 112.5 billion cell Cap Take 1 capsule by mouth twice a day (Patient not taking: Reported on 3/5/2025)    promethazine (PHENERGAN) 25 MG tablet Take 25 mg by mouth 4 (four) times daily as needed for Nausea.     Family History       Problem Relation (Age of Onset)    Asthma Mother          Tobacco Use    Smoking status: Former     Average packs/day: 1 pack/day for 30.0 years (30.0 ttl pk-yrs)     Types: Cigarettes     Start date: 1993    Smokeless tobacco: Never    Tobacco comments:     Pt states he has stopped smoking with Chantix three weeks ago. 12/1/23   Substance and Sexual Activity    Alcohol use: Yes     Comment: ocass    Drug use: Not Currently    Sexual activity: Not Currently     Review of Systems   Constitutional:  Negative for activity change and appetite change.   HENT:  Negative for congestion and dental problem.    Eyes:  Negative for discharge and itching.   Respiratory:  Negative for shortness of breath.    Cardiovascular:  Negative for chest  pain.   Gastrointestinal:  Negative for abdominal distention and abdominal pain.   Endocrine: Negative for cold intolerance.   Genitourinary:  Negative for difficulty urinating and dysuria.   Musculoskeletal:  Negative for arthralgias and back pain.   Skin:  Negative for color change.   Neurological:  Positive for dizziness and weakness. Negative for facial asymmetry.   Hematological:  Negative for adenopathy.   Psychiatric/Behavioral:  Negative for agitation and behavioral problems.      Objective:     Vital Signs (Most Recent):  Temp: 97.5 °F (36.4 °C) (03/05/25 1735)  Pulse: 99 (03/05/25 1735)  Resp: 18 (03/05/25 1424)  BP: 94/70 (03/05/25 1735)  SpO2: (!) 94 % (03/05/25 1735) Vital Signs (24h Range):  Temp:  [97.5 °F (36.4 °C)-98.2 °F (36.8 °C)] 97.5 °F (36.4 °C)  Pulse:  [] 99  Resp:  [16-20] 18  SpO2:  [94 %-100 %] 94 %  BP: ()/(67-81) 94/70     Weight: 135.8 kg (299 lb 4.8 oz)  Body mass index is 54.74 kg/m².     Physical Exam  Vitals and nursing note reviewed.   Constitutional:       Appearance: He is well-developed.   HENT:      Head: Atraumatic.      Right Ear: External ear normal.      Left Ear: External ear normal.      Nose: Nose normal.      Mouth/Throat:      Mouth: Mucous membranes are dry.   Eyes:      Extraocular Movements: Extraocular movements intact.   Cardiovascular:      Rate and Rhythm: Normal rate.   Pulmonary:      Effort: Pulmonary effort is normal.   Abdominal:      Palpations: Abdomen is soft.   Musculoskeletal:         General: Normal range of motion.      Cervical back: Full passive range of motion without pain and normal range of motion.   Skin:     General: Skin is warm.   Neurological:      Mental Status: He is alert and oriented to person, place, and time.   Psychiatric:         Behavior: Behavior normal.                Significant Labs: All pertinent labs within the past 24 hours have been reviewed.  CBC:   Recent Labs   Lab 03/05/25  1408   WBC 10.59   HGB 8.8*   HCT  29.0*        CMP:   Recent Labs   Lab 03/05/25  1408      K 3.6      CO2 29   GLU 94   BUN 11   CREATININE 1.0   CALCIUM 8.4*   PROT 6.1   ALBUMIN 3.4*   BILITOT 0.4   ALKPHOS 63   AST 11   ALT 13   ANIONGAP 6*       Significant Imaging: I have reviewed all pertinent imaging results/findings within the past 24 hours.    Assessment/Plan:     Assessment & Plan  Acute focal neurological deficit  Will admit as per [facility] CVA protocol  Need to r/o CVA/TIA  MRI Brain ordered  As per chart review in 2021 pt had KATJA and results are as follows  Left Atrium: Agitated saline study of the atrial septum is negative, suggesting absence of intracardiac shunt at the atrial level   Frequent hospital admissions  So far got admitted 7 times this year alone     Type 2 diabetes mellitus without complication, without long-term current use of insulin  H/o aware     Latest Reference Range & Units 10/27/23 06:41 03/16/24 14:04 06/06/24 04:51 09/11/24 10:48 11/02/24 04:56 03/05/25 14:08   Hemoglobin A1C External 4.5 - 6.2 % 5.2 5.7 5.2 4.9 5.8 5.3     Ulcerative colitis  Not on steroids anymore  Started on Tofacitinib recently    Long-term use of immunosuppressant medication  Aware     History of DVT (deep vein thrombosis)  Per chart review     Generalized weakness  As mentioned above     Morbid obesity  Body mass index is 54.74 kg/m². Morbid obesity complicates all aspects of disease management from diagnostic modalities to treatment.       VTE Risk Mitigation (From admission, onward)           Ordered     enoxaparin injection 40 mg  Daily         03/05/25 1450     IP VTE HIGH RISK PATIENT  Once         03/05/25 1450     Place sequential compression device  Until discontinued         03/05/25 1450                       On 03/05/2025, patient should be placed in hospital observation services under my care.             Noe Juarez MD  Department of Hospital Medicine  [facility] - Emergency Dept

## 2025-03-06 LAB
ALBUMIN SERPL BCP-MCNC: 3.2 G/DL (ref 3.5–5.2)
ALP SERPL-CCNC: 66 U/L (ref 55–135)
ALT SERPL W/O P-5'-P-CCNC: 12 U/L (ref 10–44)
ANION GAP SERPL CALC-SCNC: 5 MMOL/L (ref 8–16)
APTT PPP: 25.9 SEC (ref 21–32)
AST SERPL-CCNC: 10 U/L (ref 10–40)
BASOPHILS # BLD AUTO: 0.03 K/UL (ref 0–0.2)
BASOPHILS NFR BLD: 0.3 % (ref 0–1.9)
BILIRUB SERPL-MCNC: 0.7 MG/DL (ref 0.1–1)
BUN SERPL-MCNC: 9 MG/DL (ref 6–20)
CALCIUM SERPL-MCNC: 8.7 MG/DL (ref 8.7–10.5)
CHLORIDE SERPL-SCNC: 101 MMOL/L (ref 95–110)
CO2 SERPL-SCNC: 31 MMOL/L (ref 23–29)
CREAT SERPL-MCNC: 0.9 MG/DL (ref 0.5–1.4)
DIFFERENTIAL METHOD BLD: ABNORMAL
EOSINOPHIL # BLD AUTO: 0.1 K/UL (ref 0–0.5)
EOSINOPHIL NFR BLD: 1.1 % (ref 0–8)
ERYTHROCYTE [DISTWIDTH] IN BLOOD BY AUTOMATED COUNT: 21 % (ref 11.5–14.5)
EST. GFR  (NO RACE VARIABLE): >60 ML/MIN/1.73 M^2
GLUCOSE SERPL-MCNC: 131 MG/DL (ref 70–110)
HCT VFR BLD AUTO: 26.7 % (ref 40–54)
HGB BLD-MCNC: 8.4 G/DL (ref 14–18)
IMM GRANULOCYTES # BLD AUTO: 0.05 K/UL (ref 0–0.04)
IMM GRANULOCYTES NFR BLD AUTO: 0.4 % (ref 0–0.5)
INR PPP: 1 (ref 0.8–1.2)
LYMPHOCYTES # BLD AUTO: 0.8 K/UL (ref 1–4.8)
LYMPHOCYTES NFR BLD: 7.1 % (ref 18–48)
MAGNESIUM SERPL-MCNC: 1.4 MG/DL (ref 1.6–2.6)
MCH RBC QN AUTO: 28.6 PG (ref 27–31)
MCHC RBC AUTO-ENTMCNC: 31.5 G/DL (ref 32–36)
MCV RBC AUTO: 91 FL (ref 82–98)
MONOCYTES # BLD AUTO: 1 K/UL (ref 0.3–1)
MONOCYTES NFR BLD: 8.5 % (ref 4–15)
NEUTROPHILS # BLD AUTO: 9.4 K/UL (ref 1.8–7.7)
NEUTROPHILS NFR BLD: 82.6 % (ref 38–73)
NRBC BLD-RTO: 0 /100 WBC
PHOSPHATE SERPL-MCNC: 2.7 MG/DL (ref 2.7–4.5)
PLATELET # BLD AUTO: 240 K/UL (ref 150–450)
PMV BLD AUTO: 10.9 FL (ref 9.2–12.9)
POTASSIUM SERPL-SCNC: 3.7 MMOL/L (ref 3.5–5.1)
PROT SERPL-MCNC: 5.8 G/DL (ref 6–8.4)
PROTHROMBIN TIME: 11.2 SEC (ref 9–12.5)
RBC # BLD AUTO: 2.94 M/UL (ref 4.6–6.2)
SODIUM SERPL-SCNC: 137 MMOL/L (ref 136–145)
WBC # BLD AUTO: 11.4 K/UL (ref 3.9–12.7)

## 2025-03-06 PROCEDURE — 96372 THER/PROPH/DIAG INJ SC/IM: CPT | Performed by: STUDENT IN AN ORGANIZED HEALTH CARE EDUCATION/TRAINING PROGRAM

## 2025-03-06 PROCEDURE — 83735 ASSAY OF MAGNESIUM: CPT | Performed by: INTERNAL MEDICINE

## 2025-03-06 PROCEDURE — 92523 SPEECH SOUND LANG COMPREHEN: CPT

## 2025-03-06 PROCEDURE — 84100 ASSAY OF PHOSPHORUS: CPT | Performed by: INTERNAL MEDICINE

## 2025-03-06 PROCEDURE — 85025 COMPLETE CBC W/AUTO DIFF WBC: CPT | Performed by: INTERNAL MEDICINE

## 2025-03-06 PROCEDURE — G0378 HOSPITAL OBSERVATION PER HR: HCPCS

## 2025-03-06 PROCEDURE — 96375 TX/PRO/DX INJ NEW DRUG ADDON: CPT

## 2025-03-06 PROCEDURE — 25000003 PHARM REV CODE 250: Performed by: INTERNAL MEDICINE

## 2025-03-06 PROCEDURE — 96376 TX/PRO/DX INJ SAME DRUG ADON: CPT

## 2025-03-06 PROCEDURE — 96372 THER/PROPH/DIAG INJ SC/IM: CPT | Performed by: INTERNAL MEDICINE

## 2025-03-06 PROCEDURE — 85610 PROTHROMBIN TIME: CPT | Performed by: INTERNAL MEDICINE

## 2025-03-06 PROCEDURE — 63600175 PHARM REV CODE 636 W HCPCS: Performed by: INTERNAL MEDICINE

## 2025-03-06 PROCEDURE — 80053 COMPREHEN METABOLIC PANEL: CPT | Performed by: INTERNAL MEDICINE

## 2025-03-06 PROCEDURE — 94761 N-INVAS EAR/PLS OXIMETRY MLT: CPT

## 2025-03-06 PROCEDURE — 85730 THROMBOPLASTIN TIME PARTIAL: CPT | Performed by: INTERNAL MEDICINE

## 2025-03-06 PROCEDURE — 92610 EVALUATE SWALLOWING FUNCTION: CPT

## 2025-03-06 PROCEDURE — 63600175 PHARM REV CODE 636 W HCPCS: Performed by: STUDENT IN AN ORGANIZED HEALTH CARE EDUCATION/TRAINING PROGRAM

## 2025-03-06 PROCEDURE — 36415 COLL VENOUS BLD VENIPUNCTURE: CPT | Performed by: INTERNAL MEDICINE

## 2025-03-06 RX ORDER — LORAZEPAM 2 MG/ML
1 INJECTION INTRAMUSCULAR ONCE
Status: COMPLETED | OUTPATIENT
Start: 2025-03-06 | End: 2025-03-06

## 2025-03-06 RX ORDER — MORPHINE SULFATE 2 MG/ML
2 INJECTION, SOLUTION INTRAMUSCULAR; INTRAVENOUS ONCE
Status: COMPLETED | OUTPATIENT
Start: 2025-03-06 | End: 2025-03-06

## 2025-03-06 RX ORDER — ENOXAPARIN SODIUM 100 MG/ML
40 INJECTION SUBCUTANEOUS EVERY 12 HOURS
Status: DISCONTINUED | OUTPATIENT
Start: 2025-03-06 | End: 2025-03-06

## 2025-03-06 RX ORDER — ENOXAPARIN SODIUM 100 MG/ML
60 INJECTION SUBCUTANEOUS EVERY 12 HOURS
Status: DISCONTINUED | OUTPATIENT
Start: 2025-03-06 | End: 2025-03-07 | Stop reason: HOSPADM

## 2025-03-06 RX ORDER — MORPHINE SULFATE 2 MG/ML
2 INJECTION, SOLUTION INTRAMUSCULAR; INTRAVENOUS ONCE
Status: DISCONTINUED | OUTPATIENT
Start: 2025-03-06 | End: 2025-03-07

## 2025-03-06 RX ADMIN — ENOXAPARIN SODIUM 40 MG: 40 INJECTION SUBCUTANEOUS at 09:03

## 2025-03-06 RX ADMIN — MORPHINE SULFATE 2 MG: 2 INJECTION, SOLUTION INTRAMUSCULAR; INTRAVENOUS at 01:03

## 2025-03-06 RX ADMIN — MORPHINE SULFATE 2 MG: 2 INJECTION, SOLUTION INTRAMUSCULAR; INTRAVENOUS at 03:03

## 2025-03-06 RX ADMIN — MORPHINE SULFATE 2 MG: 2 INJECTION, SOLUTION INTRAMUSCULAR; INTRAVENOUS at 07:03

## 2025-03-06 RX ADMIN — ATORVASTATIN CALCIUM 40 MG: 40 TABLET, FILM COATED ORAL at 09:03

## 2025-03-06 RX ADMIN — ZOLPIDEM TARTRATE 10 MG: 5 TABLET, COATED ORAL at 08:03

## 2025-03-06 RX ADMIN — MORPHINE SULFATE 2 MG: 2 INJECTION, SOLUTION INTRAMUSCULAR; INTRAVENOUS at 02:03

## 2025-03-06 RX ADMIN — ESCITALOPRAM OXALATE 20 MG: 10 TABLET ORAL at 09:03

## 2025-03-06 RX ADMIN — PREGABALIN 50 MG: 25 CAPSULE ORAL at 08:03

## 2025-03-06 RX ADMIN — OXYCODONE HYDROCHLORIDE 10 MG: 10 TABLET ORAL at 04:03

## 2025-03-06 RX ADMIN — MORPHINE SULFATE 2 MG: 2 INJECTION, SOLUTION INTRAMUSCULAR; INTRAVENOUS at 04:03

## 2025-03-06 RX ADMIN — OXYCODONE HYDROCHLORIDE 10 MG: 10 TABLET ORAL at 08:03

## 2025-03-06 RX ADMIN — OXYCODONE HYDROCHLORIDE 10 MG: 10 TABLET ORAL at 10:03

## 2025-03-06 RX ADMIN — DIAZEPAM 5 MG: 5 TABLET ORAL at 04:03

## 2025-03-06 RX ADMIN — PANTOPRAZOLE SODIUM 40 MG: 40 TABLET, DELAYED RELEASE ORAL at 08:03

## 2025-03-06 RX ADMIN — ASPIRIN 81 MG: 81 TABLET, COATED ORAL at 09:03

## 2025-03-06 RX ADMIN — CARISOPRODOL 350 MG: 350 TABLET ORAL at 09:03

## 2025-03-06 RX ADMIN — BUSPIRONE HYDROCHLORIDE 15 MG: 5 TABLET ORAL at 08:03

## 2025-03-06 RX ADMIN — PREGABALIN 50 MG: 25 CAPSULE ORAL at 09:03

## 2025-03-06 RX ADMIN — PANTOPRAZOLE SODIUM 40 MG: 40 TABLET, DELAYED RELEASE ORAL at 09:03

## 2025-03-06 RX ADMIN — BUSPIRONE HYDROCHLORIDE 15 MG: 5 TABLET ORAL at 02:03

## 2025-03-06 RX ADMIN — MORPHINE SULFATE 2 MG: 2 INJECTION, SOLUTION INTRAMUSCULAR; INTRAVENOUS at 10:03

## 2025-03-06 RX ADMIN — ENOXAPARIN SODIUM 60 MG: 60 INJECTION SUBCUTANEOUS at 09:03

## 2025-03-06 RX ADMIN — CARISOPRODOL 350 MG: 350 TABLET ORAL at 08:03

## 2025-03-06 RX ADMIN — BUSPIRONE HYDROCHLORIDE 15 MG: 5 TABLET ORAL at 09:03

## 2025-03-06 RX ADMIN — LORAZEPAM 1 MG: 2 INJECTION INTRAMUSCULAR; INTRAVENOUS at 03:03

## 2025-03-06 NOTE — ASSESSMENT & PLAN NOTE
H/o aware     Latest Reference Range & Units 10/27/23 06:41 03/16/24 14:04 06/06/24 04:51 09/11/24 10:48 11/02/24 04:56 03/05/25 14:08   Hemoglobin A1C External 4.5 - 6.2 % 5.2 5.7 5.2 4.9 5.8 5.3

## 2025-03-06 NOTE — SUBJECTIVE & OBJECTIVE
Past Medical History:   Diagnosis Date    C. difficile colitis     Cavitary pneumonia 11/2023    pos aspergillus serology    Diabetes mellitus 01/2022    Hyperlipidemia 2015    Hypertension 2015    Insomnia 2015    Ulcerative colitis        Past Surgical History:   Procedure Laterality Date    BRONCHOSCOPY WITH FLUOROSCOPY Left 11/20/2023    Procedure: BRONCHOSCOPY, WITH FLUOROSCOPY;  Surgeon: Lisa Villarreal MD;  [facility]: [facility];  Service: Pulmonary;  Laterality: Left;    BRONCHOSCOPY WITH FLUOROSCOPY N/A 11/30/2023    Procedure: BRONCHOSCOPY, WITH FLUOROSCOPY;  Surgeon: Lisa Villarreal MD;  [facility];  Service: Pulmonary;  Laterality: N/A;    CARDIAC ELECTROPHYSIOLOGY MAPPING AND ABLATION  2017    SVT    CHOLECYSTECTOMY  2011    COLONOSCOPY N/A 5/3/2022    Procedure: COLONOSCOPY;  Surgeon: Shan Jean III, MD;  [facility];  Service: Endoscopy;  Laterality: N/A;    COLONOSCOPY N/A 3/16/2024    Procedure: COLONOSCOPY;  Surgeon: ALEKSANDER Ramos MD;  [facility];  Service: Endoscopy;  Laterality: N/A;    COLONOSCOPY N/A 1/17/2025    Procedure: COLONOSCOPY;  Surgeon: Russ Rowe MD;  [facility];  Service: Endoscopy;  Laterality: N/A;    COLONOSCOPY WITH FECAL MICROBIOTA TRANSFER N/A 3/21/2023    Procedure: COLONOSCOPY, WITH FECAL MICROBIOTA TRANSFER;  Surgeon: David Millan MD;  Location: [facility];  Service: Endoscopy;  Laterality: N/A;    ESOPHAGOGASTRODUODENOSCOPY N/A 4/24/2023    Procedure: EGD (ESOPHAGOGASTRODUODENOSCOPY);  Surgeon: Shan Jean III, MD;  [facility];  Service: Endoscopy;  Laterality: N/A;    ESOPHAGOGASTRODUODENOSCOPY N/A 6/5/2024    Procedure: EGD (ESOPHAGOGASTRODUODENOSCOPY);  Surgeon: Odalis Mccabe MD;  [facility];  Service: Endoscopy;  Laterality: N/A;    FLEXIBLE SIGMOIDOSCOPY N/A 6/5/2024    Procedure: SIGMOIDOSCOPY, FLEXIBLE;  Surgeon: Odalis Mccabe MD;  [facility];  Service:  Endoscopy;  Laterality: N/A;    FLEXIBLE SIGMOIDOSCOPY N/A 7/16/2024    Procedure: SIGMOIDOSCOPY, FLEXIBLE;  Surgeon: Shan Jean III, MD;  Location: [facility];  Service: Endoscopy;  Laterality: N/A;    FLEXIBLE SIGMOIDOSCOPY N/A 8/13/2024    Procedure: SIGMOIDOSCOPY, FLEXIBLE;  Surgeon: Shan Jean III, MD;  Location: [facility] END[facility];  Service: Endoscopy;  Laterality: N/A;    GANGLION CYST EXCISION Left 1992    UMBILICAL HERNIA REPAIR  2011    with mesh       Review of patient's allergies indicates:  No Known Allergies    Current Facility-Administered Medications on File Prior to Encounter   Medication    lactated ringers infusion     Current Outpatient Medications on File Prior to Encounter   Medication Sig    busPIRone (BUSPAR) 15 MG tablet Take 1 tablet (15 mg total) by mouth 3 (three) times daily.    diazePAM (VALIUM) 5 MG tablet Take 1 tablet (5 mg total) by mouth daily as needed for Anxiety.    ergocalciferol (ERGOCALCIFEROL) 50,000 unit Cap Take 1 capsule by mouth every Sunday.    EScitalopram oxalate (LEXAPRO) 20 MG tablet Take 1 tablet (20 mg total) by mouth once daily.    metoprolol succinate (TOPROL-XL) 50 MG 24 hr tablet Take 1 tablet (50 mg total) by mouth once daily.    oxyCODONE (ROXICODONE) 10 mg Tab immediate release tablet Take 1 tablet (10 mg total) by mouth every 6 (six) hours as needed for Pain.    pantoprazole (PROTONIX) 40 MG tablet Take 40 mg by mouth 2 (two) times daily.    pregabalin (LYRICA) 50 MG capsule Take 1 capsule by mouth 2 (two) times daily.    simvastatin (ZOCOR) 20 MG tablet Take 1 tablet (20 mg total) by mouth every evening.    tofacitinib (XELJANZ) 10 mg Tab Take 10 mg by mouth 2 (two) times daily.    zolpidem (AMBIEN) 10 mg Tab Take 1 tablet (10 mg total) by mouth nightly as needed (insomnia).    budesonide (ENTOCORT EC) 3 mg capsule Take 3 capsules (9 mg total) by mouth once daily. (Patient not taking: Reported on 3/5/2025)    Lactobac 2-Bifido Mery-SDanni batres (VSL#3)  112.5 billion cell Cap Take 1 capsule by mouth twice a day (Patient not taking: Reported on 3/5/2025)    promethazine (PHENERGAN) 25 MG tablet Take 25 mg by mouth 4 (four) times daily as needed for Nausea.     Family History       Problem Relation (Age of Onset)    Asthma Mother          Tobacco Use    Smoking status: Former     Average packs/day: 1 pack/day for 30.0 years (30.0 ttl pk-yrs)     Types: Cigarettes     Start date: 1993    Smokeless tobacco: Never    Tobacco comments:     Pt states he has stopped smoking with Chantix three weeks ago. 12/1/23   Substance and Sexual Activity    Alcohol use: Yes     Comment: ocass    Drug use: Not Currently    Sexual activity: Not Currently     Review of Systems   Constitutional:  Negative for activity change and appetite change.   HENT:  Negative for congestion and dental problem.    Eyes:  Negative for discharge and itching.   Respiratory:  Negative for shortness of breath.    Cardiovascular:  Negative for chest pain.   Gastrointestinal:  Negative for abdominal distention and abdominal pain.   Endocrine: Negative for cold intolerance.   Genitourinary:  Negative for difficulty urinating and dysuria.   Musculoskeletal:  Negative for arthralgias and back pain.   Skin:  Negative for color change.   Neurological:  Positive for dizziness and weakness. Negative for facial asymmetry.   Hematological:  Negative for adenopathy.   Psychiatric/Behavioral:  Negative for agitation and behavioral problems.      Objective:     Vital Signs (Most Recent):  Temp: 97.5 °F (36.4 °C) (03/05/25 1735)  Pulse: 99 (03/05/25 1735)  Resp: 18 (03/05/25 1424)  BP: 94/70 (03/05/25 1735)  SpO2: (!) 94 % (03/05/25 1735) Vital Signs (24h Range):  Temp:  [97.5 °F (36.4 °C)-98.2 °F (36.8 °C)] 97.5 °F (36.4 °C)  Pulse:  [] 99  Resp:  [16-20] 18  SpO2:  [94 %-100 %] 94 %  BP: ()/(67-81) 94/70     Weight: 135.8 kg (299 lb 4.8 oz)  Body mass index is 54.74 kg/m².     Physical Exam  Vitals and  nursing note reviewed.   Constitutional:       Appearance: He is well-developed.   HENT:      Head: Atraumatic.      Right Ear: External ear normal.      Left Ear: External ear normal.      Nose: Nose normal.      Mouth/Throat:      Mouth: Mucous membranes are dry.   Eyes:      Extraocular Movements: Extraocular movements intact.   Cardiovascular:      Rate and Rhythm: Normal rate.   Pulmonary:      Effort: Pulmonary effort is normal.   Abdominal:      Palpations: Abdomen is soft.   Musculoskeletal:         General: Normal range of motion.      Cervical back: Full passive range of motion without pain and normal range of motion.   Skin:     General: Skin is warm.   Neurological:      Mental Status: He is alert and oriented to person, place, and time.   Psychiatric:         Behavior: Behavior normal.                Significant Labs: All pertinent labs within the past 24 hours have been reviewed.  CBC:   Recent Labs   Lab 03/05/25  1408   WBC 10.59   HGB 8.8*   HCT 29.0*        CMP:   Recent Labs   Lab 03/05/25  1408      K 3.6      CO2 29   GLU 94   BUN 11   CREATININE 1.0   CALCIUM 8.4*   PROT 6.1   ALBUMIN 3.4*   BILITOT 0.4   ALKPHOS 63   AST 11   ALT 13   ANIONGAP 6*       Significant Imaging: I have reviewed all pertinent imaging results/findings within the past 24 hours.

## 2025-03-06 NOTE — NURSING
Pt taken for MRI of back. After transfer to stretcher he was given ordered medication, Morphine 2 mg IVP, Ativan 1 mg IVP, and transferred to MRI. MRI called a short time later stating Pt was not lying still and Dr. Estrada ordered another 2 mg Morphine IVP. Upon arrival at [facility] with said meds Pt stated he did not want to continue with MRI. The additional dose of Morphine was not given and Pt transported back to room, assisted to bed with call light in reach. Dr. Estrada made aware.

## 2025-03-06 NOTE — ASSESSMENT & PLAN NOTE
Body mass index is 54.74 kg/m². Morbid obesity complicates all aspects of disease management from diagnostic modalities to treatment.

## 2025-03-06 NOTE — PT/OT/SLP PROGRESS
Occupational Therapy      Patient Name:  Jacoby Jean-Baptiste   MRN:  [id number]    Patient not seen today secondary to Patient unwilling to participate (due to back pain). Will follow-up next available date.    3/6/2025

## 2025-03-06 NOTE — PT/OT/SLP EVAL
"Speech Language Pathology Evaluation  Cognitive/Bedside Swallow    Patient Name:  Jacoby Jean-Baptiste   MRN:  [id number]  Admitting Diagnosis: Acute focal neurological deficit    Recommendations:                  General Recommendations:  Follow-up not indicated  Diet recommendations:  Regular Diet - IDDSI Level 7, Thin liquids - IDDSI Level 0   Aspiration Precautions: HOB to 90 degrees and Standard aspiration precautions   General Precautions: Standard,    Communication strategies:  none    Assessment:     Jacoby Jean-Baptiste is a 50 y.o. male with an admitting diagnosis of  Acute focal neurological deficit .  He presents with swallowing and cognitive-linguistic status WFL. No overt s/s aspiration or oropharyngeal dysphagia noted; rec regular diet w/ thin liquids. No speech or language deficits noted upon evaluation. Pt demonstrating difficulty providing appropriate solutions to problems and recalling information after delay; however, given cueing, pt was able to provide adequate responses. Pt was complaining of 8/10 back pain; suspect pain impacting responses. No further ST needs at this time.    History:   Per H&P:  "HPI: 50 year old pt getting admitted with suspected TIA/CVA  Pt started having  left arm/leg weakness and tingling and speech difficulties yesterday evening   Also he noticed he has some difficulties texting on smart p[gerard  Symptoms persisted today, came to ER and got admitted"    Past Medical History:   Diagnosis Date    C. difficile colitis     Cavitary pneumonia 11/2023    pos aspergillus serology    Diabetes mellitus 01/2022    Hyperlipidemia 2015    Hypertension 2015    Insomnia 2015    Ulcerative colitis        Past Surgical History:   Procedure Laterality Date    BRONCHOSCOPY WITH FLUOROSCOPY Left 11/20/2023    Procedure: BRONCHOSCOPY, WITH FLUOROSCOPY;  Surgeon: Lisa Villarreal MD;  Location: [facility];  Service: Pulmonary;  Laterality: Left;    BRONCHOSCOPY WITH FLUOROSCOPY N/A 11/30/2023    " Procedure: BRONCHOSCOPY, WITH FLUOROSCOPY;  Surgeon: Lisa Villarreal MD;  Location: [facility];  Service: Pulmonary;  Laterality: N/A;    CARDIAC ELECTROPHYSIOLOGY MAPPING AND ABLATION  2017    SVT    CHOLECYSTECTOMY  2011    COLONOSCOPY N/A 5/3/2022    Procedure: COLONOSCOPY;  Surgeon: Shan Jean III, MD;  Location: [facility] ENDO;  Service: Endoscopy;  Laterality: N/A;    COLONOSCOPY N/A 3/16/2024    Procedure: COLONOSCOPY;  Surgeon: ALEKSANDER Ramos MD;  Location: [facility] ENDO;  Service: Endoscopy;  Laterality: N/A;    COLONOSCOPY N/A 1/17/2025    Procedure: COLONOSCOPY;  Surgeon: Russ Rowe MD;  Location: [facility];  Service: Endoscopy;  Laterality: N/A;    COLONOSCOPY WITH FECAL MICROBIOTA TRANSFER N/A 3/21/2023    Procedure: COLONOSCOPY, WITH FECAL MICROBIOTA TRANSFER;  Surgeon: David Millan MD;  Location: [facility];  Service: Endoscopy;  Laterality: N/A;    ESOPHAGOGASTRODUODENOSCOPY N/A 4/24/2023    Procedure: EGD (ESOPHAGOGASTRODUODENOSCOPY);  Surgeon: Shan Jean III, MD;  Location: [facility];  Service: Endoscopy;  Laterality: N/A;    ESOPHAGOGASTRODUODENOSCOPY N/A 6/5/2024    Procedure: EGD (ESOPHAGOGASTRODUODENOSCOPY);  Surgeon: Odalis Mccabe MD;  Location: [facility];  Service: Endoscopy;  Laterality: N/A;    FLEXIBLE SIGMOIDOSCOPY N/A 6/5/2024    Procedure: SIGMOIDOSCOPY, FLEXIBLE;  Surgeon: Odalis Mccabe MD;  Location: [facility];  Service: Endoscopy;  Laterality: N/A;    FLEXIBLE SIGMOIDOSCOPY N/A 7/16/2024    Procedure: SIGMOIDOSCOPY, FLEXIBLE;  Surgeon: Shan Jean III, MD;  Location: [facility] ENDO;  Service: Endoscopy;  Laterality: N/A;    FLEXIBLE SIGMOIDOSCOPY N/A 8/13/2024    Procedure: SIGMOIDOSCOPY, FLEXIBLE;  Surgeon: Shan Jean III, MD;  Location: [facility];  Service: Endoscopy;  Laterality: N/A;    GANGLION CYST EXCISION Left 1992    UMBILICAL HERNIA REPAIR  2011    with mesh       Social History: Patient lives alone.    Prior  Intubation HX:  none this admit    Modified Barium Swallow: none found in epic or reported by pt    Imaging:   Imaging Results              MRI Brain Without Contrast (Final result)  Result time 03/05/25 15:23:16      Final result by Kelton Wells IV, MD (03/05/25 15:23:16)                   Impression:      Significant degradation by patient motion.    No acute abnormality.      Electronically signed by: Kelton Wells  Date:    03/05/2025  Time:    15:23               Narrative:    EXAMINATION:  MRI BRAIN WITHOUT CONTRAST    CLINICAL HISTORY:  Stroke, follow up;Triage MRI for Stroke assessment;    TECHNIQUE:  Multiplanar noncontrast imaging is performed.    COMPARISON:  None.    FINDINGS:  There is significant degradation of the examination by patient motion.    Given this limitation, there are no findings of acute hemorrhage or infarction.  There is no restricted diffusion.    There is no evidence of an intra-axial mass, mass effect or shift of the midline.    The ventricular system is appropriate in size and position for age. Appropriate flow voids are present within the major blood vessels.    The skull base and craniocervical junction appear unremarkable.                                       X-Ray Chest AP Portable (Final result)  Result time 03/05/25 14:26:13      Final result by Consuelo Shea MD (03/05/25 14:26:13)                   Impression:      Mild cardiomegaly with no acute pulmonary process      Electronically signed by: Consuelo Shea  Date:    03/05/2025  Time:    14:26               Narrative:    EXAMINATION:  XR CHEST AP PORTABLE    CLINICAL HISTORY:  Stroke;    FINDINGS:  Portable chest at 14:15 hours is compared to 02/28/2025 shows cardiac silhouette is upper limits of normal in size.    There are no confluent infiltrates or pleural effusions.  Pulmonary vasculature is normal. No acute osseous abnormality.                                       CTA Head and Neck (xpd) (Final result)   Result time 03/05/25 13:54:03      Final result by Consuelo Shea MD (03/05/25 13:54:03)                   Impression:      Normal CTA of the head and neck    Minimal calcified plaque at the carotid bifurcations right greater than left      Electronically signed by: Consuelo Shea  Date:    03/05/2025  Time:    13:54               Narrative:    EXAMINATION:  CTA HEAD AND NECK (XPD)    CLINICAL HISTORY:  Neuro deficit, acute, stroke suspected;    TECHNIQUE:  Non contrast low dose axial images were obtained through the head. CT angiogram was performed from the level of the greg to the top of the head following the IV administration of 100mL of Omnipaque 350.   Sagittal and coronal reconstructions and maximum intensity projection reconstructions were performed. Arterial stenosis percentages are based on NASCET measurement criteria.    COMPARISON:  11/01/2024    FINDINGS:  Extracranial:    Aorta and great vessels: Left-sided aortic arch with normal configuration.   No evidence of stenosis of the origins of the great vessels from the arch.    Subclavian arteries: The subclavian arteries are without hemodynamically significant stenosis or occlusion.    Right carotid: The right common carotid artery is without significant stenosis. Calcified plaque at the right carotid bulb the right internal carotid artery is without significant stenosis, occlusion, or dissection.    Left carotid: The left common carotid artery is without significant stenosis. Minimal calcified plaque at the bifurcation the left internal carotid artery is without significant stenosis, occlusion, or dissection.    Extracranial vertebral arteries: Left vertebral artery is dominant the vertebral arteries are without significant stenosis, occlusion, or dissection to the skull base.    Intracranial:    Anterior circulation: Mild calcified plaque in the cavernous carotid arteries no areas of significant atherosclerotic narrowing or filling defects are  identified.  The middle cerebral arteries are normal.  The anterior cerebral arteries are normal.    Posterior circulation: The vertebral arteries are normal. The basilar artery is normal. The posterior cerebral arteries are normal.    No evidence of aneurysm or arteriovenous malformation.    Dural venous sinuses are patent.    Other:    Paraspinous soft tissues are normal.  There is no adenopathy.    The visualized portions of the lung apices are unremarkable.    There are degenerative spine changes. No concerning osseous lesions identified.                                       CT HEAD FOR STROKE (Final result)  Result time 03/05/25 13:28:31      Final result by Consuelo Shea MD (03/05/25 13:28:31)                   Impression:      No acute intracranial process    These findings were called to Dr. Broderick in the emergency department at 13:28      Electronically signed by: Consuelo Shea  Date:    03/05/2025  Time:    13:28               Narrative:    CLINICAL HISTORY:  ([id number])51 y/o  (1975) M    Neuro deficit, acute, stroke suspected;    TECHNIQUE:  (A#[id number], exam time 3/5/2025 13:23)    CT HEAD FOR STROKE [id number]    Axial CT of the brain without contrast using soft tissue and bone algorithm. None.    CMS MANDATED QUALITY DATA - CT RADIATION - 436    All CT scans at this facility utilize dose modulation, iterative reconstruction, and/or weight based dosing when appropriate to reduce radiation dose to as low as reasonably achievable.    COMPARISON:  MRI brain dated 11/01/2024    FINDINGS:  No acute intracranial hemorrhage, edema or mass effect, and no acute parenchymal abnormality. There is no hydrocephalus, herniation or midline shift, and the basal and suprasellar cisterns are within normal limits. The osseous structures show no acute skull fracture. The ventricles and sulci are normal. There is normal gray white differentiation. Orbital contents appear within normal limits. External auditory  "canals are unremarkable. The visualized paranasal sinuses and mastoid air cells are essentially clear.                                      Prior diet: Regular, thin at home and at time of CSE.    Occupation/hobbies/homemaking: Pt is disabled.    Subjective     Pt awake laying in bed. He was agreeable to ST eval.  Patient goals: "I can't sit up; my back hurts"     Pain/Comfort:  Pain Rating 1: 8/10  Location 1: back    Respiratory Status: Room air    Objective:     Cognitive Status:    Arousal/Alertness Appropriate response to stimuli  Attention No obvious deficits observed    Perseveration Not present  Orientation Oriented x4  Memory Immediate Recall WFL, Delayed 2/4 recalled w/o cues, remaining 2 recalled w/ MC cue, and recall general information WFL  Problem Solving [employer] impaired 1/6-- able to talk through w/ cue; suspect pain impacting responses  Safety awareness 0/3-- able to talk through w/ cue; suspect pain impacting responses  Simple calculation 3/3, WFL       Receptive Language:   Comprehension:   Questions Simple yes/no WFL  Open ended questions WFL  Commands  One step WFL  two step basic commands WFL  multistep basic commands WFL  complex/abstract commands WFL  Picture identification 11/11 in Fo 12  Conversation WFL    Pragmatics:    Slightly w/drawn due to pain    Expressive Language:  Verbal:    Automatic Speech  Counting WFL, Days of the week WFL, and Months of the year WFL  Initiation WFL  Repetition Words WFL  Naming Confrontation WFL and Single word responsive naming WFL  Sentence formulation WFL  Conversational speech WFL      Motor Speech:  WFL    Voice:   Adequate for age, sex, size.    Visual-Spatial:  Grossly intact; no obvious deficits noted. No obvious field cuts or neglect noted    Reading:   WFL     Written Expression:   DNT    Oral Musculature Evaluation  Oral Musculature: WFL  Dentition: scattered dentition, teeth in poor condition  Secretion Management: adequate  Mucosal Quality: " dry  Mandibular Strength and Mobility: WFL  Oral Labial Strength and Mobility: WFL  Lingual Strength and Mobility: WFL  Velar Elevation: WFL  Buccal Strength and Mobility: WFL  Volitional Cough: elicited; functional  Volitional Swallow: laryngeal movement palpated  Voice Prior to PO Intake: clear    Bedside Swallow Eval:   Consistencies Assessed:  Thin liquids water via cup and straw  Puree tsp bites applesauce  Mixed consistencies tsp bites diced peaches in thin juice  Solids cracker      Oral Phase:   WFL    Pharyngeal Phase:   no overt clinical signs/symptoms of aspiration  no overt clinical signs/symptoms of pharyngeal dysphagia    Compensatory Strategies  None    Treatment: Pt educated re purpose of evaluation, role of SLP, results of evaluation, and recs. Pt expressed understanding of recs.     Goals:   Multidisciplinary Problems       SLP Goals       Not on file                    Plan:     Patient to be seen:      Plan of Care expires:     Plan of Care reviewed with:  patient   SLP Follow-Up:  No       Discharge recommendations:  Therapy Intensity Recommendations at Discharge: No Therapy Indicated   Barriers to Discharge:  None    Time Tracking:     SLP Treatment Date:   03/06/25  Speech Start Time:  1125  Speech Stop Time:  1146     Speech Total Time (min):  21 min    Billable Minutes: Eval speech/cognitive-linguistic 11 min  and Eval Swallow and Oral Function 10 min    03/06/2025

## 2025-03-06 NOTE — ASSESSMENT & PLAN NOTE
CT head an MRI brain negative for acute abnormality  Low suspicion for cerebri vascular etiology, possible worsening of T12 and S1 vertebral fractures  MRI lumbar spine without contrast ordered   Patient unable to tolerate MRI with IV pain medicine   Consult placed to anesthesiology   NPO at midnight

## 2025-03-06 NOTE — PT/OT/SLP PROGRESS
Physical Therapy      Patient Name:  Jacoby Jean-Baptiste   MRN:  [id number]    Patient not seen today secondary to unwilling to participate due to back pain x2 attempts. Will follow-up 03/07/25.

## 2025-03-06 NOTE — PLAN OF CARE
[facility]  Initial Discharge Assessment       Primary Care Provider: Hunter Aguilar III, MD    Admission Diagnosis: Acute focal neurological deficit [R29.818]  Acute focal neurological deficit [R29.818]    Admission Date: 3/5/2025  Expected Discharge Date: 3/6/2025     completed discharge assessment with pt at bedside. Pt AAOx4s. Pt lives at home alone. Demographics, PCP, and insurance verified. No home health. No dialysis. Pt completes ADLs without assistance. Pt to discharge home via family transport. Pt has no other needs to be addressed at this time.          Payor: [employer] / Plan: [employer] ([employer]) / Product Type: Commercial /     Extended Emergency Contact Information  Primary Emergency Contact: Estiven (Sister-In-Law)Jessica  Address: [address] [place] [address], [place] [address] [facility]  Home Phone: [phone]  Mobile Phone: [phone]  Relation: Sister  Preferred language: English   needed? No  Secondary Emergency Contact: Dallas Jean-Baptiste   [place]  Mobile Phone: [phone]  Relation: Brother  Preferred language: English   needed? No    Discharge Plan A: Home  Discharge Plan B: Home      [id number] - [place], [place] - [address] & [facility]  [address] [facility] [address]  Phone: [phone] Fax: [phone]    [id number] - [place], [place] - [address] [facility] [address]  [address]  [facility] [address]  Phone: [phone] Fax: [phone]    [facility] [address]  [facility] [address]  Phone: [phone] Fax: [phone]      Initial Assessment (most recent)       Adult Discharge Assessment - 03/06/25 1102          Discharge Assessment    Assessment Type Discharge Planning Assessment     Confirmed/corrected address, phone number and insurance Yes     Confirmed  Demographics Correct on Facesheet     Source of Information patient     When was your last doctors appointment? --   Last week    Does patient/caregiver understand observation status Yes     Reason For Admission Acute focal neurological deficit     People in Home alone     Facility Arrived From: Home     Do you expect to return to your current living situation? Yes     Do you have help at home or someone to help you manage your care at home? Yes     Who are your caregiver(s) and their phone number(s)? Jessica Jean-Baptiste (sister in law) [phone]     Prior to hospitilization cognitive status: Alert/Oriented     Current cognitive status: Alert/Oriented     Walking or Climbing Stairs Difficulty no     Dressing/Bathing Difficulty no     Home Accessibility wheelchair accessible     Home Layout Able to live on 1st floor     Equipment Currently Used at Home none     Readmission within 30 days? Yes     Patient currently being followed by outpatient case management? No     Do you currently have service(s) that help you manage your care at home? No     Do you take prescription medications? Yes     Do you have prescription coverage? Yes     Coverage [employer] ([employer])     Do you have any problems affording any of your prescribed medications? No     Is the patient taking medications as prescribed? yes     Who is going to help you get home at discharge? Jessica Jean-Baptiste (sister in law) [phone]     How do you get to doctors appointments? car, drives self     Are you on dialysis? No     Do you take coumadin? No     Discharge Plan A Home     Discharge Plan B Home     DME Needed Upon Discharge  none     Discharge Plan discussed with: Patient

## 2025-03-06 NOTE — PROGRESS NOTES
Pharmacist Renal Dose Adjustment Note    Jacoby Jean-Baptiste is a 50 y.o. male being treated with the medication Enoxaparin    Patient Data:    Vital Signs (Most Recent):  Temp: 97.9 °F (36.6 °C) (03/06/25 0748)  Pulse: 107 (03/06/25 0748)  Resp: 20 (03/06/25 0755)  BP: (!) 156/73 (03/06/25 0748)  SpO2: (!) 94 % (03/06/25 0748) Vital Signs (72h Range):  Temp:  [97.5 °F (36.4 °C)-98.2 °F (36.8 °C)]   Pulse:  []   Resp:  [16-22]   BP: ()/(67-91)   SpO2:  [94 %-100 %]      Recent Labs   Lab 03/02/25  0521 03/05/25  1408 03/06/25  0706   CREATININE 1.3 1.0 0.9     Serum creatinine: 0.9 mg/dL 03/06/25 0706  Estimated creatinine clearance: 119.6 mL/min    The enoxaparin dose has been adjusted for Jacoby Jean-Baptiste [id number] according to the pharmacy practice protocol.      Based on the patient's Estimated Creatinine Clearance: 119.6 mL/min (based on SCr of 0.9 mg/dL)., Body mass index is 54.74 kg/m²., and weight= 135.8 kg (299 lb 4.8 oz), the enoxaparin dose has been adjusted 60 mg every 12 hours.    Thank you,  Lizett Hi

## 2025-03-06 NOTE — SUBJECTIVE & OBJECTIVE
Interval History:  Patient was seen and examined at bedside this morning.  He is having severe low back pain.  Patient initially came in for left-sided weakness particularly of his left lower extremity.  On physical exam, patient does have 2/5 strength in left lower extremity, 5/5 in the right lower extremity.  No decreased sensation.  No numbness or tingling, no bowel or bladder incontinence, no saddle anesthesia, no red flag signs or symptoms.  Patient recently had MRI lumbar spine on 03/02.  This appears to be an acute change since that time.  Attempted to get MRI lumbar spine today, however patient was unable to tolerate even with IV pain medicine.  Patient will likely require anesthesia for MRI.  Anesthesiology will be consulted.    Review of Systems   Constitutional:  Negative for activity change and appetite change.   HENT:  Negative for congestion and dental problem.    Eyes:  Negative for discharge and itching.   Respiratory:  Negative for shortness of breath.    Cardiovascular:  Negative for chest pain.   Gastrointestinal:  Negative for abdominal distention and abdominal pain.   Endocrine: Negative for cold intolerance.   Genitourinary:  Negative for difficulty urinating and dysuria.   Musculoskeletal:  Negative for arthralgias and back pain.   Skin:  Negative for color change.   Neurological:  Positive for dizziness and weakness. Negative for facial asymmetry.   Hematological:  Negative for adenopathy.   Psychiatric/Behavioral:  Negative for agitation and behavioral problems.      Objective:     Vital Signs (Most Recent):  Temp: 98.2 °F (36.8 °C) (03/06/25 1127)  Pulse: 98 (03/06/25 1127)  Resp: 18 (03/06/25 1631)  BP: (!) 132/93 (03/06/25 1127)  SpO2: (!) 93 % (03/06/25 1127) Vital Signs (24h Range):  Temp:  [97.5 °F (36.4 °C)-98.2 °F (36.8 °C)] 98.2 °F (36.8 °C)  Pulse:  [] 98  Resp:  [18-22] 18  SpO2:  [93 %-96 %] 93 %  BP: ()/(70-93) 132/93     Weight: 135.8 kg (299 lb 4.8 oz)  Body mass  index is 54.74 kg/m².    Intake/Output Summary (Last 24 hours) at 3/6/2025 1643  Last data filed at 3/6/2025 1641  Gross per 24 hour   Intake 240 ml   Output --   Net 240 ml         Physical Exam  Vitals and nursing note reviewed.   Constitutional:       Appearance: He is well-developed.   HENT:      Head: Atraumatic.      Right Ear: External ear normal.      Left Ear: External ear normal.      Nose: Nose normal.      Mouth/Throat:      Mouth: Mucous membranes are dry.   Eyes:      Extraocular Movements: Extraocular movements intact.   Cardiovascular:      Rate and Rhythm: Normal rate.   Pulmonary:      Effort: Pulmonary effort is normal.   Abdominal:      Palpations: Abdomen is soft.   Musculoskeletal:         General: Tenderness present.      Cervical back: Full passive range of motion without pain and normal range of motion.   Skin:     General: Skin is warm.   Neurological:      Mental Status: He is alert and oriented to person, place, and time.      Motor: Weakness present.   Psychiatric:         Behavior: Behavior normal.               Significant Labs: All pertinent labs within the past 24 hours have been reviewed.  CBC:   Recent Labs   Lab 03/05/25  1408 03/06/25  0707   WBC 10.59 11.40   HGB 8.8* 8.4*   HCT 29.0* 26.7*    240     CMP:   Recent Labs   Lab 03/05/25  1408 03/06/25  0706    137   K 3.6 3.7    101   CO2 29 31*   GLU 94 131*   BUN 11 9   CREATININE 1.0 0.9   CALCIUM 8.4* 8.7   PROT 6.1 5.8*   ALBUMIN 3.4* 3.2*   BILITOT 0.4 0.7   ALKPHOS 63 66   AST 11 10   ALT 13 12   ANIONGAP 6* 5*       Significant Imaging: I have reviewed all pertinent imaging results/findings within the past 24 hours.  Imaging Results              MRI Brain Without Contrast (Final result)  Result time 03/05/25 15:23:16      Final result by Kelton Wells IV, MD (03/05/25 15:23:16)                   Impression:      Significant degradation by patient motion.    No acute abnormality.      Electronically  signed by: Kelton Wells  Date:    03/05/2025  Time:    15:23               Narrative:    EXAMINATION:  MRI BRAIN WITHOUT CONTRAST    CLINICAL HISTORY:  Stroke, follow up;Triage MRI for Stroke assessment;    TECHNIQUE:  Multiplanar noncontrast imaging is performed.    COMPARISON:  None.    FINDINGS:  There is significant degradation of the examination by patient motion.    Given this limitation, there are no findings of acute hemorrhage or infarction.  There is no restricted diffusion.    There is no evidence of an intra-axial mass, mass effect or shift of the midline.    The ventricular system is appropriate in size and position for age. Appropriate flow voids are present within the major blood vessels.    The skull base and craniocervical junction appear unremarkable.                                       X-Ray Chest AP Portable (Final result)  Result time 03/05/25 14:26:13      Final result by Consuelo Shea MD (03/05/25 14:26:13)                   Impression:      Mild cardiomegaly with no acute pulmonary process      Electronically signed by: Consuelo Shea  Date:    03/05/2025  Time:    14:26               Narrative:    EXAMINATION:  XR CHEST AP PORTABLE    CLINICAL HISTORY:  Stroke;    FINDINGS:  Portable chest at 14:15 hours is compared to 02/28/2025 shows cardiac silhouette is upper limits of normal in size.    There are no confluent infiltrates or pleural effusions.  Pulmonary vasculature is normal. No acute osseous abnormality.                                       CTA Head and Neck (xpd) (Final result)  Result time 03/05/25 13:54:03      Final result by Consuelo Shea MD (03/05/25 13:54:03)                   Impression:      Normal CTA of the head and neck    Minimal calcified plaque at the carotid bifurcations right greater than left      Electronically signed by: Consuelo Shea  Date:    03/05/2025  Time:    13:54               Narrative:    EXAMINATION:  CTA HEAD AND NECK (XPD)    CLINICAL  HISTORY:  Neuro deficit, acute, stroke suspected;    TECHNIQUE:  Non contrast low dose axial images were obtained through the head. CT angiogram was performed from the level of the greg to the top of the head following the IV administration of 100mL of Omnipaque 350.   Sagittal and coronal reconstructions and maximum intensity projection reconstructions were performed. Arterial stenosis percentages are based on NASCET measurement criteria.    COMPARISON:  11/01/2024    FINDINGS:  Extracranial:    Aorta and great vessels: Left-sided aortic arch with normal configuration.   No evidence of stenosis of the origins of the great vessels from the arch.    Subclavian arteries: The subclavian arteries are without hemodynamically significant stenosis or occlusion.    Right carotid: The right common carotid artery is without significant stenosis. Calcified plaque at the right carotid bulb the right internal carotid artery is without significant stenosis, occlusion, or dissection.    Left carotid: The left common carotid artery is without significant stenosis. Minimal calcified plaque at the bifurcation the left internal carotid artery is without significant stenosis, occlusion, or dissection.    Extracranial vertebral arteries: Left vertebral artery is dominant the vertebral arteries are without significant stenosis, occlusion, or dissection to the skull base.    Intracranial:    Anterior circulation: Mild calcified plaque in the cavernous carotid arteries no areas of significant atherosclerotic narrowing or filling defects are identified.  The middle cerebral arteries are normal.  The anterior cerebral arteries are normal.    Posterior circulation: The vertebral arteries are normal. The basilar artery is normal. The posterior cerebral arteries are normal.    No evidence of aneurysm or arteriovenous malformation.    Dural venous sinuses are patent.    Other:    Paraspinous soft tissues are normal.  There is no  adenopathy.    The visualized portions of the lung apices are unremarkable.    There are degenerative spine changes. No concerning osseous lesions identified.                                       CT HEAD FOR STROKE (Final result)  Result time 03/05/25 13:28:31      Final result by Consuelo Shea MD (03/05/25 13:28:31)                   Impression:      No acute intracranial process    These findings were called to Dr. Broderick in the emergency department at 13:28      Electronically signed by: Consuelo Shea  Date:    03/05/2025  Time:    13:28               Narrative:    CLINICAL HISTORY:  ([id number])51 y/o  (1975) M    Neuro deficit, acute, stroke suspected;    TECHNIQUE:  (A#[id number], exam time 3/5/2025 13:23)    CT HEAD FOR STROKE [id number]    Axial CT of the brain without contrast using soft tissue and bone algorithm. None.    CMS MANDATED QUALITY DATA - CT RADIATION - 436    All CT scans at this facility utilize dose modulation, iterative reconstruction, and/or weight based dosing when appropriate to reduce radiation dose to as low as reasonably achievable.    COMPARISON:  MRI brain dated 11/01/2024    FINDINGS:  No acute intracranial hemorrhage, edema or mass effect, and no acute parenchymal abnormality. There is no hydrocephalus, herniation or midline shift, and the basal and suprasellar cisterns are within normal limits. The osseous structures show no acute skull fracture. The ventricles and sulci are normal. There is normal gray white differentiation. Orbital contents appear within normal limits. External auditory canals are unremarkable. The visualized paranasal sinuses and mastoid air cells are essentially clear.

## 2025-03-06 NOTE — PROGRESS NOTES
[facility] Medicine  Progress Note    Patient Name: Jacoby Jean-Baptiste  MRN: [id number]  Patient Class: OP- Observation   Admission Date: 3/5/2025  Length of Stay: 0 days  Attending Physician: Adrian Estrada DO  Primary Care Provider: Hunter Aguilar III, MD        Subjective     Principal Problem:Acute focal neurological deficit        HPI:  50 year old pt getting admitted with suspected TIA/CVA  Pt started having  left arm/leg weakness and tingling and speech difficulties yesterday evening   Also he noticed he has some difficulties texting on smart p[gerard  Symptoms persisted today, came to ER and got admitted       Overview/Hospital Course:  No notes on file    Interval History:  Patient was seen and examined at bedside this morning.  He is having severe low back pain.  Patient initially came in for left-sided weakness particularly of his left lower extremity.  On physical exam, patient does have 2/5 strength in left lower extremity, 5/5 in the right lower extremity.  No decreased sensation.  No numbness or tingling, no bowel or bladder incontinence, no saddle anesthesia, no red flag signs or symptoms.  Patient recently had MRI lumbar spine on 03/02.  This appears to be an acute change since that time.  Attempted to get MRI lumbar spine today, however patient was unable to tolerate even with IV pain medicine.  Patient will likely require anesthesia for MRI.  Anesthesiology will be consulted.    Review of Systems   Constitutional:  Negative for activity change and appetite change.   HENT:  Negative for congestion and dental problem.    Eyes:  Negative for discharge and itching.   Respiratory:  Negative for shortness of breath.    Cardiovascular:  Negative for chest pain.   Gastrointestinal:  Negative for abdominal distention and abdominal pain.   Endocrine: Negative for cold intolerance.   Genitourinary:  Negative for difficulty urinating and dysuria.   Musculoskeletal:  Negative for  arthralgias and back pain.   Skin:  Negative for color change.   Neurological:  Positive for dizziness and weakness. Negative for facial asymmetry.   Hematological:  Negative for adenopathy.   Psychiatric/Behavioral:  Negative for agitation and behavioral problems.      Objective:     Vital Signs (Most Recent):  Temp: 98.2 °F (36.8 °C) (03/06/25 1127)  Pulse: 98 (03/06/25 1127)  Resp: 18 (03/06/25 1631)  BP: (!) 132/93 (03/06/25 1127)  SpO2: (!) 93 % (03/06/25 1127) Vital Signs (24h Range):  Temp:  [97.5 °F (36.4 °C)-98.2 °F (36.8 °C)] 98.2 °F (36.8 °C)  Pulse:  [] 98  Resp:  [18-22] 18  SpO2:  [93 %-96 %] 93 %  BP: ()/(70-93) 132/93     Weight: 135.8 kg (299 lb 4.8 oz)  Body mass index is 54.74 kg/m².    Intake/Output Summary (Last 24 hours) at 3/6/2025 1643  Last data filed at 3/6/2025 1641  Gross per 24 hour   Intake 240 ml   Output --   Net 240 ml         Physical Exam  Vitals and nursing note reviewed.   Constitutional:       Appearance: He is well-developed.   HENT:      Head: Atraumatic.      Right Ear: External ear normal.      Left Ear: External ear normal.      Nose: Nose normal.      Mouth/Throat:      Mouth: Mucous membranes are dry.   Eyes:      Extraocular Movements: Extraocular movements intact.   Cardiovascular:      Rate and Rhythm: Normal rate.   Pulmonary:      Effort: Pulmonary effort is normal.   Abdominal:      Palpations: Abdomen is soft.   Musculoskeletal:         General: Tenderness present.      Cervical back: Full passive range of motion without pain and normal range of motion.   Skin:     General: Skin is warm.   Neurological:      Mental Status: He is alert and oriented to person, place, and time.      Motor: Weakness present.   Psychiatric:         Behavior: Behavior normal.               Significant Labs: All pertinent labs within the past 24 hours have been reviewed.  CBC:   Recent Labs   Lab 03/05/25  1408 03/06/25  0707   WBC 10.59 11.40   HGB 8.8* 8.4*   HCT 29.0* 26.7*     240     CMP:   Recent Labs   Lab 03/05/25  1408 03/06/25  0706    137   K 3.6 3.7    101   CO2 29 31*   GLU 94 131*   BUN 11 9   CREATININE 1.0 0.9   CALCIUM 8.4* 8.7   PROT 6.1 5.8*   ALBUMIN 3.4* 3.2*   BILITOT 0.4 0.7   ALKPHOS 63 66   AST 11 10   ALT 13 12   ANIONGAP 6* 5*       Significant Imaging: I have reviewed all pertinent imaging results/findings within the past 24 hours.  Imaging Results              MRI Brain Without Contrast (Final result)  Result time 03/05/25 15:23:16      Final result by Kelton Wells IV, MD (03/05/25 15:23:16)                   Impression:      Significant degradation by patient motion.    No acute abnormality.      Electronically signed by: Kelton Wells  Date:    03/05/2025  Time:    15:23               Narrative:    EXAMINATION:  MRI BRAIN WITHOUT CONTRAST    CLINICAL HISTORY:  Stroke, follow up;Triage MRI for Stroke assessment;    TECHNIQUE:  Multiplanar noncontrast imaging is performed.    COMPARISON:  None.    FINDINGS:  There is significant degradation of the examination by patient motion.    Given this limitation, there are no findings of acute hemorrhage or infarction.  There is no restricted diffusion.    There is no evidence of an intra-axial mass, mass effect or shift of the midline.    The ventricular system is appropriate in size and position for age. Appropriate flow voids are present within the major blood vessels.    The skull base and craniocervical junction appear unremarkable.                                       X-Ray Chest AP Portable (Final result)  Result time 03/05/25 14:26:13      Final result by Consuelo Shea MD (03/05/25 14:26:13)                   Impression:      Mild cardiomegaly with no acute pulmonary process      Electronically signed by: Consuelo Shea  Date:    03/05/2025  Time:    14:26               Narrative:    EXAMINATION:  XR CHEST AP PORTABLE    CLINICAL HISTORY:  Stroke;    FINDINGS:  Portable chest at 14:15  hours is compared to 02/28/2025 shows cardiac silhouette is upper limits of normal in size.    There are no confluent infiltrates or pleural effusions.  Pulmonary vasculature is normal. No acute osseous abnormality.                                       CTA Head and Neck (xpd) (Final result)  Result time 03/05/25 13:54:03      Final result by Consuelo Shea MD (03/05/25 13:54:03)                   Impression:      Normal CTA of the head and neck    Minimal calcified plaque at the carotid bifurcations right greater than left      Electronically signed by: Consuelo Shea  Date:    03/05/2025  Time:    13:54               Narrative:    EXAMINATION:  CTA HEAD AND NECK (XPD)    CLINICAL HISTORY:  Neuro deficit, acute, stroke suspected;    TECHNIQUE:  Non contrast low dose axial images were obtained through the head. CT angiogram was performed from the level of the greg to the top of the head following the IV administration of 100mL of Omnipaque 350.   Sagittal and coronal reconstructions and maximum intensity projection reconstructions were performed. Arterial stenosis percentages are based on NASCET measurement criteria.    COMPARISON:  11/01/2024    FINDINGS:  Extracranial:    Aorta and great vessels: Left-sided aortic arch with normal configuration.   No evidence of stenosis of the origins of the great vessels from the arch.    Subclavian arteries: The subclavian arteries are without hemodynamically significant stenosis or occlusion.    Right carotid: The right common carotid artery is without significant stenosis. Calcified plaque at the right carotid bulb the right internal carotid artery is without significant stenosis, occlusion, or dissection.    Left carotid: The left common carotid artery is without significant stenosis. Minimal calcified plaque at the bifurcation the left internal carotid artery is without significant stenosis, occlusion, or dissection.    Extracranial vertebral arteries: Left  vertebral artery is dominant the vertebral arteries are without significant stenosis, occlusion, or dissection to the skull base.    Intracranial:    Anterior circulation: Mild calcified plaque in the cavernous carotid arteries no areas of significant atherosclerotic narrowing or filling defects are identified.  The middle cerebral arteries are normal.  The anterior cerebral arteries are normal.    Posterior circulation: The vertebral arteries are normal. The basilar artery is normal. The posterior cerebral arteries are normal.    No evidence of aneurysm or arteriovenous malformation.    Dural venous sinuses are patent.    Other:    Paraspinous soft tissues are normal.  There is no adenopathy.    The visualized portions of the lung apices are unremarkable.    There are degenerative spine changes. No concerning osseous lesions identified.                                       CT HEAD FOR STROKE (Final result)  Result time 03/05/25 13:28:31      Final result by Consuelo Shea MD (03/05/25 13:28:31)                   Impression:      No acute intracranial process    These findings were called to Dr. Broderick in the emergency department at 13:28      Electronically signed by: Consuelo Shea  Date:    03/05/2025  Time:    13:28               Narrative:    CLINICAL HISTORY:  ([id number])49 y/o  (1975) M    Neuro deficit, acute, stroke suspected;    TECHNIQUE:  (A#[id number], exam time 3/5/2025 13:23)    CT HEAD FOR STROKE [id number]    Axial CT of the brain without contrast using soft tissue and bone algorithm. None.    CMS MANDATED QUALITY DATA - CT RADIATION - 436    All CT scans at this facility utilize dose modulation, iterative reconstruction, and/or weight based dosing when appropriate to reduce radiation dose to as low as reasonably achievable.    COMPARISON:  MRI brain dated 11/01/2024    FINDINGS:  No acute intracranial hemorrhage, edema or mass effect, and no acute parenchymal abnormality. There is no  hydrocephalus, herniation or midline shift, and the basal and suprasellar cisterns are within normal limits. The osseous structures show no acute skull fracture. The ventricles and sulci are normal. There is normal gray white differentiation. Orbital contents appear within normal limits. External auditory canals are unremarkable. The visualized paranasal sinuses and mastoid air cells are essentially clear.                                          Assessment & Plan  Acute focal neurological deficit  CT head an MRI brain negative for acute abnormality  Low suspicion for cerebri vascular etiology, possible worsening of T12 and S1 vertebral fractures  MRI lumbar spine without contrast ordered   Patient unable to tolerate MRI with IV pain medicine   Consult placed to anesthesiology   NPO at midnight  Frequent hospital admissions  So far got admitted 7 times this year alone     Type 2 diabetes mellitus without complication, without long-term current use of insulin  H/o aware     Latest Reference Range & Units 10/27/23 06:41 03/16/24 14:04 06/06/24 04:51 09/11/24 10:48 11/02/24 04:56 03/05/25 14:08   Hemoglobin A1C External 4.5 - 6.2 % 5.2 5.7 5.2 4.9 5.8 5.3     Ulcerative colitis  Not on steroids anymore  Started on Tofacitinib recently    Long-term use of immunosuppressant medication  Aware     History of DVT (deep vein thrombosis)  Per chart review     Generalized weakness  As mentioned above     Morbid obesity  Body mass index is 54.74 kg/m². Morbid obesity complicates all aspects of disease management from diagnostic modalities to treatment.     VTE Risk Mitigation (From admission, onward)           Ordered     enoxaparin injection 60 mg  Every 12 hours         03/06/25 0956     IP VTE HIGH RISK PATIENT  Once         03/05/25 1450     Place sequential compression device  Until discontinued         03/05/25 1450                    Discharge Planning   GERARDO: 3/7/2025     Code Status: Full Code   Medical Readiness for  Discharge Date:   Discharge Plan A: Home                Please place Justification for DME        Adrian Estrada DO  Department of Hospital Medicine   [facility]

## 2025-03-06 NOTE — PROGRESS NOTES
"Pharmacist Renal Dose Adjustment Note    Jacoby Jean-Baptiste is a 50 y.o. male being treated with the medication Enoxaparin    Patient Data:    Vital Signs (Most Recent):  Temp: 98.1 °F (36.7 °C) (03/05/25 2014)  Pulse: 95 (03/05/25 2014)  Resp: 20 (03/06/25 0159)  BP: (!) 159/91 (03/05/25 2014)  SpO2: 96 % (03/05/25 2014) Vital Signs (72h Range):  Temp:  [97.5 °F (36.4 °C)-98.2 °F (36.8 °C)]   Pulse:  []   Resp:  [16-20]   BP: ()/(67-91)   SpO2:  [94 %-100 %]        Ht: 5' 2" (1.575 m)  Wt: 135.8 kg (299 lb 4.8 oz)  Estimated Creatinine Clearance: 107.7 mL/min (based on SCr of 1 mg/dL).  Body mass index is 54.74 kg/m².    Per [facility] renal dosing protocol:     Previous Order: Enoxaparin 40 mg Q24H    Will be changed to:     New Order: Enoxaparin 40 mg Q12H,    Due to: Per Pharmacy Protocol    Renal dose adjustments performed as noted above.    We will continue monitoring and adjusting as necessary.    Pharmacist: Rip Wilson PharmD  Ext: [phone]      "

## 2025-03-06 NOTE — PLAN OF CARE
Problem: Adult Inpatient Plan of Care  Goal: Plan of Care Review  Outcome: Progressing  Goal: Patient-Specific Goal (Individualized)  Outcome: Progressing  Goal: Absence of Hospital-Acquired Illness or Injury  Outcome: Progressing  Goal: Optimal Comfort and Wellbeing  Outcome: Progressing  Goal: Readiness for Transition of Care  Outcome: Progressing     Problem: Bariatric Environmental Safety  Goal: Safety Maintained with Care  Outcome: Progressing     Problem: Stroke, Ischemic (Includes Transient Ischemic Attack)  Goal: Optimal Coping  Outcome: Progressing  Goal: Effective Bowel Elimination  Outcome: Progressing  Goal: Optimal Cerebral Tissue Perfusion  Outcome: Progressing  Goal: Optimal Cognitive Function  Outcome: Progressing  Goal: Improved Communication Skills  Outcome: Progressing  Goal: Optimal Functional Ability  Outcome: Progressing  Goal: Optimal Nutrition Intake  Outcome: Progressing  Goal: Effective Oxygenation and Ventilation  Outcome: Progressing  Goal: Improved Sensorimotor Function  Outcome: Progressing  Goal: Safe and Effective Swallow  Outcome: Progressing  Goal: Effective Urinary Elimination  Outcome: Progressing     Problem: Fall Injury Risk  Goal: Absence of Fall and Fall-Related Injury  Outcome: Progressing     Problem: Infection  Goal: Absence of Infection Signs and Symptoms  Outcome: Progressing     Problem: Diabetes Comorbidity  Goal: Blood Glucose Level Within Targeted Range  Outcome: Progressing
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
## 2025-03-06 NOTE — PROGRESS NOTES
Pt had pain meds & ativan. Pt said he could not hold still. More pain meds were going to be given but pt refused to continue

## 2025-03-06 NOTE — CONSULTS
Replaced by [facility]  Adult Nutrition  Education Short Note      Nutrition Education    Previous education: no    Diet at home: Regular    Written information provided and explained on cardiac diet.     Discussed with: patient    Educational Need? yes    Barriers: none identified    Interventions: Sodium modified diet    Patient and/or family comprehend instructions: yes    Outcome: Verbalizes understanding     Thanks for the consult!    Angie Carrion RD 03/06/2025 3:20 PM            PRINCIPAL DISCHARGE DIAGNOSIS  Diagnosis: Primary osteoarthritis of right hip  Assessment and Plan of Treatment: right anterior MAK  Physical Therapy/Occupational Therapy for: Ambulation, transfers, stairs, & ADLs, isometrics.   Full weight bearing on both legs; Walker/cane use as instructed by Physical therapy/Occupational therapy. Anterior Total Hip Replacement precautions for  6 weeks: No straight leg raise; No external rotation of hip when extended-standing or lying flat; No hyperextension of hip when standing (kickback).   Ice packs to hip for 30 min. every 3 hours and after physical therapy.   Keep incision clean and dry. You have a sliverlon dressing that is water resistant and may get slightly wet in the shower.  Remove dressing  in 7 days.  You have prineo that was used for closure. It may get slightly wet in the shower as long as there is no drainage from   Prineo  removal on/near after Post Op Day # 14 in Surgeon's office.  • Do not take a tub bath yet.   • Do not resume driving until you have your surgeon’s permission.

## 2025-03-07 ENCOUNTER — ANESTHESIA (OUTPATIENT)
Dept: ANESTHESIOLOGY | Facility: HOSPITAL | Age: 50
End: 2025-03-07
Payer: COMMERCIAL

## 2025-03-07 ENCOUNTER — ANESTHESIA EVENT (OUTPATIENT)
Dept: ANESTHESIOLOGY | Facility: HOSPITAL | Age: 50
End: 2025-03-07
Payer: COMMERCIAL

## 2025-03-07 VITALS
RESPIRATION RATE: 18 BRPM | HEIGHT: 62 IN | OXYGEN SATURATION: 91 % | HEART RATE: 95 BPM | WEIGHT: 299.31 LBS | SYSTOLIC BLOOD PRESSURE: 119 MMHG | TEMPERATURE: 98 F | DIASTOLIC BLOOD PRESSURE: 75 MMHG | BODY MASS INDEX: 55.08 KG/M2

## 2025-03-07 LAB
ALBUMIN SERPL BCP-MCNC: 3.3 G/DL (ref 3.5–5.2)
ALP SERPL-CCNC: 61 U/L (ref 55–135)
ALT SERPL W/O P-5'-P-CCNC: 10 U/L (ref 10–44)
ANION GAP SERPL CALC-SCNC: 4 MMOL/L (ref 8–16)
AST SERPL-CCNC: 11 U/L (ref 10–40)
BASOPHILS # BLD AUTO: 0.03 K/UL (ref 0–0.2)
BASOPHILS NFR BLD: 0.3 % (ref 0–1.9)
BILIRUB SERPL-MCNC: 0.6 MG/DL (ref 0.1–1)
BUN SERPL-MCNC: 9 MG/DL (ref 6–20)
CALCIUM SERPL-MCNC: 8.6 MG/DL (ref 8.7–10.5)
CHLORIDE SERPL-SCNC: 102 MMOL/L (ref 95–110)
CO2 SERPL-SCNC: 29 MMOL/L (ref 23–29)
CREAT SERPL-MCNC: 0.9 MG/DL (ref 0.5–1.4)
DIFFERENTIAL METHOD BLD: ABNORMAL
EOSINOPHIL # BLD AUTO: 0.1 K/UL (ref 0–0.5)
EOSINOPHIL NFR BLD: 1.4 % (ref 0–8)
ERYTHROCYTE [DISTWIDTH] IN BLOOD BY AUTOMATED COUNT: 21 % (ref 11.5–14.5)
EST. GFR  (NO RACE VARIABLE): >60 ML/MIN/1.73 M^2
GLUCOSE SERPL-MCNC: 118 MG/DL (ref 70–110)
HCT VFR BLD AUTO: 26.2 % (ref 40–54)
HGB BLD-MCNC: 8.1 G/DL (ref 14–18)
IMM GRANULOCYTES # BLD AUTO: 0.06 K/UL (ref 0–0.04)
IMM GRANULOCYTES NFR BLD AUTO: 0.6 % (ref 0–0.5)
LYMPHOCYTES # BLD AUTO: 1 K/UL (ref 1–4.8)
LYMPHOCYTES NFR BLD: 10.9 % (ref 18–48)
MCH RBC QN AUTO: 28.7 PG (ref 27–31)
MCHC RBC AUTO-ENTMCNC: 30.9 G/DL (ref 32–36)
MCV RBC AUTO: 93 FL (ref 82–98)
MONOCYTES # BLD AUTO: 0.9 K/UL (ref 0.3–1)
MONOCYTES NFR BLD: 9.7 % (ref 4–15)
NEUTROPHILS # BLD AUTO: 7.3 K/UL (ref 1.8–7.7)
NEUTROPHILS NFR BLD: 77.1 % (ref 38–73)
NRBC BLD-RTO: 0 /100 WBC
PLATELET # BLD AUTO: 196 K/UL (ref 150–450)
PMV BLD AUTO: 10.9 FL (ref 9.2–12.9)
POCT GLUCOSE: 126 MG/DL (ref 70–110)
POTASSIUM SERPL-SCNC: 4 MMOL/L (ref 3.5–5.1)
PROT SERPL-MCNC: 5.7 G/DL (ref 6–8.4)
RBC # BLD AUTO: 2.82 M/UL (ref 4.6–6.2)
SODIUM SERPL-SCNC: 135 MMOL/L (ref 136–145)
WBC # BLD AUTO: 9.52 K/UL (ref 3.9–12.7)

## 2025-03-07 PROCEDURE — 37000008 HC ANESTHESIA 1ST 15 MINUTES

## 2025-03-07 PROCEDURE — 96376 TX/PRO/DX INJ SAME DRUG ADON: CPT

## 2025-03-07 PROCEDURE — 80053 COMPREHEN METABOLIC PANEL: CPT | Performed by: INTERNAL MEDICINE

## 2025-03-07 PROCEDURE — 97165 OT EVAL LOW COMPLEX 30 MIN: CPT

## 2025-03-07 PROCEDURE — 27202107 HC XP QUATRO SENSOR: Performed by: ANESTHESIOLOGY

## 2025-03-07 PROCEDURE — 97162 PT EVAL MOD COMPLEX 30 MIN: CPT

## 2025-03-07 PROCEDURE — 82962 GLUCOSE BLOOD TEST: CPT

## 2025-03-07 PROCEDURE — 85025 COMPLETE CBC W/AUTO DIFF WBC: CPT | Performed by: INTERNAL MEDICINE

## 2025-03-07 PROCEDURE — 25000003 PHARM REV CODE 250: Performed by: INTERNAL MEDICINE

## 2025-03-07 PROCEDURE — 25000003 PHARM REV CODE 250: Performed by: NURSE ANESTHETIST, CERTIFIED REGISTERED

## 2025-03-07 PROCEDURE — 94761 N-INVAS EAR/PLS OXIMETRY MLT: CPT

## 2025-03-07 PROCEDURE — 63600175 PHARM REV CODE 636 W HCPCS: Performed by: NURSE ANESTHETIST, CERTIFIED REGISTERED

## 2025-03-07 PROCEDURE — 97530 THERAPEUTIC ACTIVITIES: CPT

## 2025-03-07 PROCEDURE — 27201107 HC STYLET, STANDARD: Performed by: ANESTHESIOLOGY

## 2025-03-07 PROCEDURE — 37000009 HC ANESTHESIA EA ADD 15 MINS

## 2025-03-07 PROCEDURE — G0378 HOSPITAL OBSERVATION PER HR: HCPCS

## 2025-03-07 PROCEDURE — 97116 GAIT TRAINING THERAPY: CPT

## 2025-03-07 PROCEDURE — 63600175 PHARM REV CODE 636 W HCPCS: Performed by: INTERNAL MEDICINE

## 2025-03-07 PROCEDURE — 63600175 PHARM REV CODE 636 W HCPCS: Performed by: STUDENT IN AN ORGANIZED HEALTH CARE EDUCATION/TRAINING PROGRAM

## 2025-03-07 PROCEDURE — 71000033 HC RECOVERY, INTIAL HOUR

## 2025-03-07 PROCEDURE — 96372 THER/PROPH/DIAG INJ SC/IM: CPT | Performed by: STUDENT IN AN ORGANIZED HEALTH CARE EDUCATION/TRAINING PROGRAM

## 2025-03-07 PROCEDURE — 36415 COLL VENOUS BLD VENIPUNCTURE: CPT | Performed by: INTERNAL MEDICINE

## 2025-03-07 PROCEDURE — 25000003 PHARM REV CODE 250: Performed by: STUDENT IN AN ORGANIZED HEALTH CARE EDUCATION/TRAINING PROGRAM

## 2025-03-07 PROCEDURE — 96375 TX/PRO/DX INJ NEW DRUG ADDON: CPT

## 2025-03-07 PROCEDURE — 27000673 HC TUBING BLOOD Y: Performed by: ANESTHESIOLOGY

## 2025-03-07 RX ORDER — KETOROLAC TROMETHAMINE 10 MG/1
10 TABLET, FILM COATED ORAL EVERY 6 HOURS PRN
Status: DISCONTINUED | OUTPATIENT
Start: 2025-03-07 | End: 2025-03-07 | Stop reason: HOSPADM

## 2025-03-07 RX ORDER — PROPOFOL 10 MG/ML
VIAL (ML) INTRAVENOUS
Status: DISCONTINUED | OUTPATIENT
Start: 2025-03-07 | End: 2025-03-07

## 2025-03-07 RX ORDER — PREDNISONE 20 MG/1
60 TABLET ORAL DAILY
Qty: 12 TABLET | Refills: 0 | Status: ON HOLD | OUTPATIENT
Start: 2025-03-08 | End: 2025-03-17 | Stop reason: HOSPADM

## 2025-03-07 RX ORDER — FENTANYL CITRATE 50 UG/ML
25 INJECTION, SOLUTION INTRAMUSCULAR; INTRAVENOUS EVERY 5 MIN PRN
Status: DISCONTINUED | OUTPATIENT
Start: 2025-03-07 | End: 2025-03-07 | Stop reason: HOSPADM

## 2025-03-07 RX ORDER — ONDANSETRON HYDROCHLORIDE 2 MG/ML
INJECTION, SOLUTION INTRAVENOUS
Status: DISCONTINUED | OUTPATIENT
Start: 2025-03-07 | End: 2025-03-07

## 2025-03-07 RX ORDER — OXYCODONE HYDROCHLORIDE 10 MG/1
10 TABLET ORAL EVERY 6 HOURS PRN
Qty: 12 TABLET | Refills: 0 | Status: ON HOLD | OUTPATIENT
Start: 2025-03-07 | End: 2025-03-17 | Stop reason: HOSPADM

## 2025-03-07 RX ORDER — ONDANSETRON HYDROCHLORIDE 2 MG/ML
4 INJECTION, SOLUTION INTRAVENOUS DAILY PRN
Status: DISCONTINUED | OUTPATIENT
Start: 2025-03-07 | End: 2025-03-07 | Stop reason: HOSPADM

## 2025-03-07 RX ORDER — KETOROLAC TROMETHAMINE 30 MG/ML
30 INJECTION, SOLUTION INTRAMUSCULAR; INTRAVENOUS ONCE
Status: COMPLETED | OUTPATIENT
Start: 2025-03-07 | End: 2025-03-07

## 2025-03-07 RX ORDER — SUCCINYLCHOLINE CHLORIDE 20 MG/ML
INJECTION INTRAMUSCULAR; INTRAVENOUS
Status: DISCONTINUED | OUTPATIENT
Start: 2025-03-07 | End: 2025-03-07

## 2025-03-07 RX ORDER — DEXAMETHASONE SODIUM PHOSPHATE 4 MG/ML
INJECTION, SOLUTION INTRA-ARTICULAR; INTRALESIONAL; INTRAMUSCULAR; INTRAVENOUS; SOFT TISSUE
Status: DISCONTINUED | OUTPATIENT
Start: 2025-03-07 | End: 2025-03-07

## 2025-03-07 RX ORDER — METHOCARBAMOL 750 MG/1
750 TABLET, FILM COATED ORAL 4 TIMES DAILY
Status: DISCONTINUED | OUTPATIENT
Start: 2025-03-07 | End: 2025-03-07 | Stop reason: HOSPADM

## 2025-03-07 RX ORDER — PREDNISONE 20 MG/1
60 TABLET ORAL DAILY
Status: DISCONTINUED | OUTPATIENT
Start: 2025-03-07 | End: 2025-03-07 | Stop reason: HOSPADM

## 2025-03-07 RX ORDER — ROCURONIUM BROMIDE 10 MG/ML
INJECTION, SOLUTION INTRAVENOUS
Status: DISCONTINUED | OUTPATIENT
Start: 2025-03-07 | End: 2025-03-07

## 2025-03-07 RX ORDER — LIDOCAINE HYDROCHLORIDE 20 MG/ML
INJECTION, SOLUTION EPIDURAL; INFILTRATION; INTRACAUDAL; PERINEURAL
Status: DISCONTINUED | OUTPATIENT
Start: 2025-03-07 | End: 2025-03-07

## 2025-03-07 RX ORDER — OXYCODONE HYDROCHLORIDE 5 MG/1
5 TABLET ORAL
Status: DISCONTINUED | OUTPATIENT
Start: 2025-03-07 | End: 2025-03-07 | Stop reason: HOSPADM

## 2025-03-07 RX ORDER — KETOROLAC TROMETHAMINE 10 MG/1
10 TABLET, FILM COATED ORAL EVERY 6 HOURS PRN
Qty: 20 TABLET | Refills: 0 | Status: ON HOLD | OUTPATIENT
Start: 2025-03-07 | End: 2025-03-17 | Stop reason: HOSPADM

## 2025-03-07 RX ORDER — METHOCARBAMOL 750 MG/1
750 TABLET, FILM COATED ORAL 4 TIMES DAILY PRN
Qty: 40 TABLET | Refills: 0 | Status: ON HOLD | OUTPATIENT
Start: 2025-03-07 | End: 2025-03-17

## 2025-03-07 RX ORDER — DIPHENHYDRAMINE HYDROCHLORIDE 50 MG/ML
12.5 INJECTION, SOLUTION INTRAMUSCULAR; INTRAVENOUS
Status: DISCONTINUED | OUTPATIENT
Start: 2025-03-07 | End: 2025-03-07 | Stop reason: HOSPADM

## 2025-03-07 RX ORDER — GLUCAGON 1 MG
1 KIT INJECTION
Status: DISCONTINUED | OUTPATIENT
Start: 2025-03-07 | End: 2025-03-07 | Stop reason: HOSPADM

## 2025-03-07 RX ADMIN — LIDOCAINE HYDROCHLORIDE 50 MG: 20 INJECTION, SOLUTION EPIDURAL; INFILTRATION; INTRACAUDAL; PERINEURAL at 08:03

## 2025-03-07 RX ADMIN — METHOCARBAMOL 750 MG: 750 TABLET ORAL at 01:03

## 2025-03-07 RX ADMIN — ASPIRIN 81 MG: 81 TABLET, COATED ORAL at 01:03

## 2025-03-07 RX ADMIN — BUSPIRONE HYDROCHLORIDE 15 MG: 5 TABLET ORAL at 01:03

## 2025-03-07 RX ADMIN — ENOXAPARIN SODIUM 60 MG: 60 INJECTION SUBCUTANEOUS at 01:03

## 2025-03-07 RX ADMIN — KETOROLAC TROMETHAMINE 30 MG: 30 INJECTION, SOLUTION INTRAMUSCULAR; INTRAVENOUS at 04:03

## 2025-03-07 RX ADMIN — ROCURONIUM BROMIDE 25 MG: 10 INJECTION, SOLUTION INTRAVENOUS at 08:03

## 2025-03-07 RX ADMIN — MORPHINE SULFATE 2 MG: 2 INJECTION, SOLUTION INTRAMUSCULAR; INTRAVENOUS at 12:03

## 2025-03-07 RX ADMIN — METHYLPREDNISOLONE SODIUM SUCCINATE 60 MG: 40 INJECTION, POWDER, FOR SOLUTION INTRAMUSCULAR; INTRAVENOUS at 04:03

## 2025-03-07 RX ADMIN — PANTOPRAZOLE SODIUM 40 MG: 40 TABLET, DELAYED RELEASE ORAL at 01:03

## 2025-03-07 RX ADMIN — SODIUM CHLORIDE, SODIUM LACTATE, POTASSIUM CHLORIDE, AND CALCIUM CHLORIDE: .6; .31; .03; .02 INJECTION, SOLUTION INTRAVENOUS at 08:03

## 2025-03-07 RX ADMIN — SUGAMMADEX 200 MG: 100 INJECTION, SOLUTION INTRAVENOUS at 09:03

## 2025-03-07 RX ADMIN — OXYCODONE HYDROCHLORIDE 10 MG: 10 TABLET ORAL at 01:03

## 2025-03-07 RX ADMIN — PREDNISONE 60 MG: 20 TABLET ORAL at 01:03

## 2025-03-07 RX ADMIN — Medication 140 MG: at 08:03

## 2025-03-07 RX ADMIN — OXYCODONE HYDROCHLORIDE 10 MG: 10 TABLET ORAL at 02:03

## 2025-03-07 RX ADMIN — ATORVASTATIN CALCIUM 40 MG: 40 TABLET, FILM COATED ORAL at 01:03

## 2025-03-07 RX ADMIN — ESCITALOPRAM OXALATE 20 MG: 10 TABLET ORAL at 01:03

## 2025-03-07 RX ADMIN — PROPOFOL 150 MG: 10 INJECTION, EMULSION INTRAVENOUS at 08:03

## 2025-03-07 RX ADMIN — CARISOPRODOL 350 MG: 350 TABLET ORAL at 02:03

## 2025-03-07 RX ADMIN — ONDANSETRON HYDROCHLORIDE 4 MG: 2 SOLUTION INTRAMUSCULAR; INTRAVENOUS at 08:03

## 2025-03-07 RX ADMIN — PREGABALIN 50 MG: 25 CAPSULE ORAL at 01:03

## 2025-03-07 RX ADMIN — DEXAMETHASONE SODIUM PHOSPHATE 4 MG: 4 INJECTION, SOLUTION INTRAMUSCULAR; INTRAVENOUS at 08:03

## 2025-03-07 RX ADMIN — DIAZEPAM 5 MG: 5 TABLET ORAL at 02:03

## 2025-03-07 RX ADMIN — ROCURONIUM BROMIDE 5 MG: 10 INJECTION, SOLUTION INTRAVENOUS at 08:03

## 2025-03-07 NOTE — CARE UPDATE
03/06/25 2015   Patient Assessment/Suction   Level of Consciousness (AVPU) alert   Respiratory Effort Unlabored   Expansion/Accessory Muscles/Retractions no use of accessory muscles   All Lung Fields Breath Sounds clear;diminished;equal bilaterally   Rhythm/Pattern, Respiratory no shortness of breath reported   Cough Frequency no cough   PRE-TX-O2   Device (Oxygen Therapy) room air   SpO2 99 %   Pulse Oximetry Type Intermittent   $ Pulse Oximetry - Multiple Charge Pulse Oximetry - Multiple   Positioning   Head of Bed (HOB) Positioning HOB elevated;HOB at 20-30 degrees

## 2025-03-07 NOTE — TRANSFER OF CARE
"Anesthesia Transfer of Care Note    Patient: Jacoby Jean-Baptiste    Procedure(s) Performed: * No procedures listed *    Patient location: PACU    Anesthesia Type: general    Transport from OR: Transported from OR on 6-10 L/min O2 by face mask with adequate spontaneous ventilation    Post pain: adequate analgesia    Post assessment: no apparent anesthetic complications and tolerated procedure well    Post vital signs: stable    Level of consciousness: awake    Nausea/Vomiting: no nausea/vomiting    Complications: none    Transfer of care protocol was followed      Last vitals: Visit Vitals  /77   Pulse 102   Temp 37.2 °C (99 °F) (Oral)   Resp 16   Ht 5' 2" (1.575 m)   Wt 135.8 kg (299 lb 4.8 oz)   SpO2 95%   BMI 54.74 kg/m²     "

## 2025-03-07 NOTE — NURSING
Patients IV removed. Monitor removed and returned. Reviewed discharge instructions with patient, verbalized understanding. Patient packed belongings. Transferred via wheelchair per staff downstairs where patients car is to be discharged home.

## 2025-03-07 NOTE — ANESTHESIA PREPROCEDURE EVALUATION
03/07/2025  Jacoby Jean-Baptiste is a 50 y.o., male.        Results for orders placed or performed during the hospital encounter of 03/05/25   ECG 12 lead    Collection Time: 03/05/25  3:20 PM   Result Value Ref Range    QRS Duration 74 ms    OHS QTC Calculation 450 ms    Narrative    Test Reason : R29.818,    Vent. Rate : 102 BPM     Atrial Rate : 102 BPM     P-R Int : 138 ms          QRS Dur :  74 ms      QT Int : 346 ms       P-R-T Axes :  53  29  23 degrees    QTcB Int : 450 ms    Sinus tachycardia  Otherwise normal ECG  When compared with ECG of 28-Feb-2025 15:06,  No significant change was found  Confirmed by Ministerio Roman ([id number]) on 3/5/2025 4:58:48 PM    Referred By: AAAREFERRAL SELF           Confirmed By: Ministerio Roman        Imaging Results              MRI Brain Without Contrast (Final result)  Result time 03/05/25 15:23:16      Final result by Kelton Wells IV, MD (03/05/25 15:23:16)                   Impression:      Significant degradation by patient motion.    No acute abnormality.      Electronically signed by: Kelton Wells  Date:    03/05/2025  Time:    15:23               Narrative:    EXAMINATION:  MRI BRAIN WITHOUT CONTRAST    CLINICAL HISTORY:  Stroke, follow up;Triage MRI for Stroke assessment;    TECHNIQUE:  Multiplanar noncontrast imaging is performed.    COMPARISON:  None.    FINDINGS:  There is significant degradation of the examination by patient motion.    Given this limitation, there are no findings of acute hemorrhage or infarction.  There is no restricted diffusion.    There is no evidence of an intra-axial mass, mass effect or shift of the midline.    The ventricular system is appropriate in size and position for age. Appropriate flow voids are present within the major blood vessels.    The skull base and craniocervical junction appear unremarkable.                                        X-Ray Chest AP Portable (Final result)  Result time 03/05/25 14:26:13      Final result by Consuelo Shea MD (03/05/25 14:26:13)                   Impression:      Mild cardiomegaly with no acute pulmonary process      Electronically signed by: Consuelo Shea  Date:    03/05/2025  Time:    14:26               Narrative:    EXAMINATION:  XR CHEST AP PORTABLE    CLINICAL HISTORY:  Stroke;    FINDINGS:  Portable chest at 14:15 hours is compared to 02/28/2025 shows cardiac silhouette is upper limits of normal in size.    There are no confluent infiltrates or pleural effusions.  Pulmonary vasculature is normal. No acute osseous abnormality.                                       CTA Head and Neck (xpd) (Final result)  Result time 03/05/25 13:54:03      Final result by Consuelo Shea MD (03/05/25 13:54:03)                   Impression:      Normal CTA of the head and neck    Minimal calcified plaque at the carotid bifurcations right greater than left      Electronically signed by: Consuelo Shea  Date:    03/05/2025  Time:    13:54               Narrative:    EXAMINATION:  CTA HEAD AND NECK (XPD)    CLINICAL HISTORY:  Neuro deficit, acute, stroke suspected;    TECHNIQUE:  Non contrast low dose axial images were obtained through the head. CT angiogram was performed from the level of the greg to the top of the head following the IV administration of 100mL of Omnipaque 350.   Sagittal and coronal reconstructions and maximum intensity projection reconstructions were performed. Arterial stenosis percentages are based on NASCET measurement criteria.    COMPARISON:  11/01/2024    FINDINGS:  Extracranial:    Aorta and great vessels: Left-sided aortic arch with normal configuration.   No evidence of stenosis of the origins of the great vessels from the arch.    Subclavian arteries: The subclavian arteries are without hemodynamically significant stenosis or occlusion.    Right carotid: The right common  carotid artery is without significant stenosis. Calcified plaque at the right carotid bulb the right internal carotid artery is without significant stenosis, occlusion, or dissection.    Left carotid: The left common carotid artery is without significant stenosis. Minimal calcified plaque at the bifurcation the left internal carotid artery is without significant stenosis, occlusion, or dissection.    Extracranial vertebral arteries: Left vertebral artery is dominant the vertebral arteries are without significant stenosis, occlusion, or dissection to the skull base.    Intracranial:    Anterior circulation: Mild calcified plaque in the cavernous carotid arteries no areas of significant atherosclerotic narrowing or filling defects are identified.  The middle cerebral arteries are normal.  The anterior cerebral arteries are normal.    Posterior circulation: The vertebral arteries are normal. The basilar artery is normal. The posterior cerebral arteries are normal.    No evidence of aneurysm or arteriovenous malformation.    Dural venous sinuses are patent.    Other:    Paraspinous soft tissues are normal.  There is no adenopathy.    The visualized portions of the lung apices are unremarkable.    There are degenerative spine changes. No concerning osseous lesions identified.                                       CT HEAD FOR STROKE (Final result)  Result time 03/05/25 13:28:31      Final result by Consuelo Shea MD (03/05/25 13:28:31)                   Impression:      No acute intracranial process    These findings were called to Dr. Broderick in the emergency department at 13:28      Electronically signed by: Consuelo Shea  Date:    03/05/2025  Time:    13:28               Narrative:    CLINICAL HISTORY:  ([id number])51 y/o  (1975) M    Neuro deficit, acute, stroke suspected;    TECHNIQUE:  (A#[id number], exam time 3/5/2025 13:23)    CT HEAD FOR STROKE [id number]    Axial CT of the brain without contrast using soft  tissue and bone algorithm. None.    CMS MANDATED QUALITY DATA - CT RADIATION - 436    All CT scans at this facility utilize dose modulation, iterative reconstruction, and/or weight based dosing when appropriate to reduce radiation dose to as low as reasonably achievable.    COMPARISON:  MRI brain dated 11/01/2024    FINDINGS:  No acute intracranial hemorrhage, edema or mass effect, and no acute parenchymal abnormality. There is no hydrocephalus, herniation or midline shift, and the basal and suprasellar cisterns are within normal limits. The osseous structures show no acute skull fracture. The ventricles and sulci are normal. There is normal gray white differentiation. Orbital contents appear within normal limits. External auditory canals are unremarkable. The visualized paranasal sinuses and mastoid air cells are essentially clear.                                       Lab Results   Component Value Date    WBC 9.52 03/07/2025    HGB 8.1 (L) 03/07/2025    HCT 26.2 (L) 03/07/2025    MCV 93 03/07/2025     03/07/2025     BMP  Lab Results   Component Value Date     (L) 03/07/2025    K 4.0 03/07/2025     03/07/2025    CO2 29 03/07/2025    BUN 9 03/07/2025    CREATININE 0.9 03/07/2025    CALCIUM 8.6 (L) 03/07/2025    ANIONGAP 4 (L) 03/07/2025     (H) 03/07/2025     (H) 03/06/2025    GLU 94 03/05/2025       Results for orders placed during the hospital encounter of 11/17/23    Echo    Interpretation Summary    Left Ventricle: The left ventricle is normal in size. Normal wall thickness. There is concentric remodeling. Normal wall motion. There is normal systolic function with a visually estimated ejection fraction of 55 - 60%. There is normal diastolic function.    Right Ventricle: Normal right ventricular cavity size. Wall thickness is normal. Right ventricle wall motion  is normal. Systolic function is normal.    IVC/SVC: Normal venous pressure at 3 mmHg.          Pre-op Assessment    I  have reviewed the Patient Summary Reports.     I have reviewed the Nursing Notes. I have reviewed the NPO Status.   I have reviewed the Medications.     Review of Systems  Anesthesia Hx:  No problems with previous Anesthesia             Denies Family Hx of Anesthesia complications.    Denies Personal Hx of Anesthesia complications.                    Social:  Former Smoker, Alcohol Use       Hematology/Oncology:    Oncology Normal    -- Anemia:               Hematology Comments: History of DVT.  Patient currently receiving Lovenox.                    EENT/Dental:   Very poor dentition.  Most teeth broken or missing.          Cardiovascular:     Hypertension, poorly controlled    Dysrhythmias       hyperlipidemia   ECG has been reviewed. History of supraventricular tachycardia  S/P radiofrequency ablation operation for arrhythmia                           Pulmonary:  Pneumonia (History of cavitary pneumonia)                      Renal/:  Chronic Renal Disease renal calculi       Kidney Function/Disease             Hepatic/GI:   PUD,   Liver Disease,  History of C. difficile colitis.  No recent nausea or vomiting         Esophageal / Stomach Disorders Gastric Conditions:, Gastroparesis   Bowel Conditions:  Inflammatory Bowel Disease, Ulcerative Colitis     Liver Disease        Musculoskeletal:     Acute onset severe low back pain       Spine Disorders: lumbar            Neurological:    Neuromuscular Disease,       Admitted with Left arm/leg weakness and tingling and speech difficulties.  However MRI of brain negative.  Upon my exam, patient has no left arm weakness.  Left leg weakness possibly due to lumbar radiculopathy.        Chronic Pain Syndrome (patient takes Percocet 10 mg 3 times a day for abdominal pain due to ulcerative colitis.)   Peripheral Neuropathy                          Endocrine:  Diabetes, well controlled, type 2         Morbid Obesity / BMI > 40  Psych:  Psychiatric History anxiety depression  Sleep Disorder and Insomnia. Anxiety Disorder and Panic Attacks.      Psychotic Disorder.  Sleep Disorder and Insomnia.        Physical Exam  General: Cooperative, Alert and Oriented    Airway:  Mallampati: III   Mouth Opening: Normal  TM Distance: > 6 cm  Tongue: Normal  Neck ROM: Normal ROM    Dental:  Periodontal disease  Multiple chipped and broken   Patient denies loose teeth  Chest/Lungs:  Normal Respiratory Rate, Rhonchi    Heart:  Rate: Normal  Rhythm: Regular Rhythm  Sounds: Normal        Anesthesia Plan  Type of Anesthesia, risks & benefits discussed:    Anesthesia Type: Gen ETT  Intra-op Monitoring Plan: Standard ASA Monitors  Post Op Pain Control Plan:   (medical reason for not using multimodal pain management)  Induction:  IV  Airway Plan: Video  Informed Consent: Informed consent signed with the Patient and all parties understand the risks and agree with anesthesia plan.  All questions answered.   ASA Score: 3  Anesthesia Plan Notes:       Ready For Surgery From Anesthesia Perspective.     .

## 2025-03-07 NOTE — HOSPITAL COURSE
Patient initially admitted with left-sided weakness, particularly of the left lower extremity.  He complained of severe low back pain.  Recent MRI showed T10-L1 fractures.  Labs unremarkable, patient hemodynamically stable.  Patient required large amounts of pain control.  Attempted MRI with IV pain medicine, patient unable to tolerate.  Anesthesiology consulted, the patient received general anesthesia to get repeat MRI lumbar spine without contrast.  This showed no new acute change.  MRI brain negative for stroke or ischemia.  Patient was given Toradol, prednisone, methocarbamol, and oxycodone.  Patient was seen and examined on day of discharge.  He is okay for discharge at this time.  Patient will follow up outpatient with the primary care physician.  Ambulatory referral made to pain management clinic.

## 2025-03-07 NOTE — PLAN OF CARE
Problem: Adult Inpatient Plan of Care  Goal: Plan of Care Review  Outcome: Met  Goal: Patient-Specific Goal (Individualized)  Outcome: Met  Goal: Absence of Hospital-Acquired Illness or Injury  Outcome: Met  Goal: Optimal Comfort and Wellbeing  Outcome: Met  Goal: Readiness for Transition of Care  Outcome: Met     Problem: Bariatric Environmental Safety  Goal: Safety Maintained with Care  Outcome: Met     Problem: Stroke, Ischemic (Includes Transient Ischemic Attack)  Goal: Optimal Coping  Outcome: Met  Goal: Effective Bowel Elimination  Outcome: Met  Goal: Optimal Cerebral Tissue Perfusion  Outcome: Met  Goal: Optimal Cognitive Function  Outcome: Met  Goal: Improved Communication Skills  Outcome: Met  Goal: Optimal Functional Ability  Outcome: Met  Goal: Optimal Nutrition Intake  Outcome: Met  Goal: Effective Oxygenation and Ventilation  Outcome: Met  Goal: Improved Sensorimotor Function  Outcome: Met  Goal: Safe and Effective Swallow  Outcome: Met  Goal: Effective Urinary Elimination  Outcome: Met     Problem: Fall Injury Risk  Goal: Absence of Fall and Fall-Related Injury  Outcome: Met     Problem: Infection  Goal: Absence of Infection Signs and Symptoms  Outcome: Met     Problem: Diabetes Comorbidity  Goal: Blood Glucose Level Within Targeted Range  Outcome: Met

## 2025-03-07 NOTE — PLAN OF CARE
Cleared from case management standpoint.   03/07/25 1536   Final Note   Assessment Type Discharge Planning Assessment   Anticipated Discharge Disposition Home   What phone number can be called within the next 1-3 days to see how you are doing after discharge? [phone]   Hospital Resources/Appts/Education Provided Appointments scheduled and added to AVS   Post-Acute Status   Discharge Delays None known at this time     No HH/DME needs.  Patient reports having transportation home.  Follow up appointment added to AVS.  No further case management needs.

## 2025-03-07 NOTE — PLAN OF CARE
03/07/25 1539   Final Note   Assessment Type Final Discharge Note   Anticipated Discharge Disposition Home   What phone number can be called within the next 1-3 days to see how you are doing after discharge? [phone]   Hospital Resources/Appts/Education Provided Appointments scheduled and added to AVS
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
## 2025-03-07 NOTE — PLAN OF CARE
Nursing Transfer Note      Reason patient is being transferred: PACU 05    Transfer To: 2533    Transfer via stretcher    Transfer with None    Transported by BROOKE Reyes     Medicines sent: None    Any special needs or follow-up needed: Routine Care    Chart send with patient: Yes    Notified: Patient requested no one to be updated.    Patient reassessed at: 3/7/2025 1003 (date, time)     Two PCTs and several nursing students came to the bedside to help transfer patient from stretcher to bed. Patient's RN was paged but did not come to the bedside.

## 2025-03-07 NOTE — ANESTHESIA PROCEDURE NOTES
Intubation    Date/Time: 3/7/2025 8:29 AM    Performed by: Felix Triana CRNA  Authorized by: Carlos Alberto Bender MD    Intubation:     Induction:  Intravenous    Intubated:  Postinduction    Mask Ventilation:  N/a    Attempts:  1    Attempted By:  CRNA    Method of Intubation:  Video laryngoscopy    Blade:  Blackwell 4    Laryngeal View Grade: Grade I - full view of cords      Difficult Airway Encountered?: No      Complications:  None    Airway Device:  Oral endotracheal tube    Airway Device Size:  8.0    Style/Cuff Inflation:  Cuffed    Inflation Amount (mL):  7    Tube secured:  22    Secured at:  The lips    Placement Verified By:  Capnometry    Complicating Factors:  None    Findings Post-Intubation:  BS equal bilateral and atraumatic/condition of teeth unchanged       Average

## 2025-03-07 NOTE — DISCHARGE SUMMARY
[facility] Medicine  Discharge Summary      Patient Name: Jacoby Jean-Baptiste  MRN: [id number]  DAYSI: [id number]  Patient Class: OP- Observation  Admission Date: 3/5/2025  Hospital Length of Stay: 0 days  Discharge Date and Time:  03/07/2025 4:51 PM  Attending Physician: Adrian Estrada DO   Discharging Provider: Adrian Estrada DO  Primary Care Provider: Hunter Aguilar III, MD    Primary Care Team: Networked reference to record PCT     HPI:   50 year old pt getting admitted with suspected TIA/CVA  Pt started having  left arm/leg weakness and tingling and speech difficulties yesterday evening   Also he noticed he has some difficulties texting on smart p[gerard  Symptoms persisted today, came to ER and got admitted       Procedure(s) (LRB):  MRI (MAGNETIC RESONANCE IMAGING) (N/A)      Hospital Course:   Patient initially admitted with left-sided weakness, particularly of the left lower extremity.  He complained of severe low back pain.  Recent MRI showed T10-L1 fractures.  Labs unremarkable, patient hemodynamically stable.  Patient required large amounts of pain control.  Attempted MRI with IV pain medicine, patient unable to tolerate.  Anesthesiology consulted, the patient received general anesthesia to get repeat MRI lumbar spine without contrast.  This showed no new acute change.  MRI brain negative for stroke or ischemia.  Patient was given Toradol, prednisone, methocarbamol, and oxycodone.  Patient was seen and examined on day of discharge.  He is okay for discharge at this time.  Patient will follow up outpatient with the primary care physician.  Ambulatory referral made to pain management clinic.     Goals of Care Treatment Preferences:  Code Status: Full Code      SDOH Screening:  The patient declined to be screened for utility difficulties, food insecurity, transport difficulties, housing insecurity, and interpersonal safety, so no concerns could be identified this admission.      Consults:   Consults (From admission, onward)          Status Ordering Provider     Inpatient consult to Anesthesiology  Once        Provider:  Carlos Alberto Bender MD    Acknowledged KATHLEEN INFANTE     Inpatient consult to Registered Dietitian/Nutritionist  Once        Provider:  (Not yet assigned)    Completed MAIN GIVENS     IP consult to case management/social work  Once        Provider:  (Not yet assigned)    Completed MAIN GIVENS     Consult to Telemedicine - Acute Stroke  Once        Provider:  (Not yet assigned)    Completed KATHY REYES            Assessment & Plan  Acute focal neurological deficit  CT head an MRI brain negative for acute abnormality  Low suspicion for cerebri vascular etiology, possible worsening of T12 and S1 vertebral fractures  MRI lumbar spine without contrast ordered   Patient unable to tolerate MRI with IV pain medicine   Consult placed to anesthesiology   NPO at midnight  Frequent hospital admissions  So far got admitted 7 times this year alone     Type 2 diabetes mellitus without complication, without long-term current use of insulin  H/o aware     Latest Reference Range & Units 10/27/23 06:41 03/16/24 14:04 06/06/24 04:51 09/11/24 10:48 11/02/24 04:56 03/05/25 14:08   Hemoglobin A1C External 4.5 - 6.2 % 5.2 5.7 5.2 4.9 5.8 5.3     Ulcerative colitis  Not on steroids anymore  Started on Tofacitinib recently    Long-term use of immunosuppressant medication  Aware     History of DVT (deep vein thrombosis)  Per chart review     Generalized weakness  As mentioned above     Morbid obesity  Body mass index is 54.74 kg/m². Morbid obesity complicates all aspects of disease management from diagnostic modalities to treatment.     Final Active Diagnoses:    Diagnosis Date Noted POA    PRINCIPAL PROBLEM:  Acute focal neurological deficit [R29.818] 03/05/2025 Yes    Generalized weakness [R53.1] 03/05/2025 Yes    Frequent hospital admissions [Z78.9] 03/05/2025 Yes    Morbid obesity [E66.01]  03/05/2025 Yes    History of DVT (deep vein thrombosis) [Z86.718] 11/04/2024 Not Applicable    Long-term use of immunosuppressant medication [Z79.60] 12/02/2023 Not Applicable    Ulcerative colitis [K51.90] 01/11/2023 Yes    Type 2 diabetes mellitus without complication, without long-term current use of insulin [E11.9] 01/29/2022 Yes      Problems Resolved During this Admission:       Discharged Condition: good    Disposition: Home or Self Care    Follow Up:   Follow-up Information       Hunter Aguilar III, MD. Schedule an appointment as soon as possible for a visit in 1 week(s).    Specialty: Family Medicine  Why: Call office for 1 week follow up  Contact information:  [address] [place] [address]  [address] [place] [id number] [phone]                           Patient Instructions:      Ambulatory referral/consult to Pain Clinic   Standing Status: Future   Referral Priority: Routine Referral Type: Consultation   Referral Reason: Specialty Services Required   Requested Specialty: Pain Medicine   Number of Visits Requested: 1     Diet Cardiac   Order Comments: See Stroke Patient Education Guide Booklet for details.     Call 911 for any of the following:   Order Comments: Call 911  right away if any of the following warning signs come on suddenly, even if the symptoms only last for a few minutes. With stroke, timing is very important.   - Warning Signs of Stroke:  - Weakness: You may feel a sudden weakness, tingling or loss of feeling on one side of your face or body.  - Vision Problems: You may have sudden double vision or trouble seeing in one or both eyes.  - Speech Problems: You may have sudden trouble talking, slured speech, or problems understanding others.  - Headache: You may have sudden, severe headache.  - Movement Problems: You may experience dizziness, a feeling of spinning, a loss of balance, a feeling of falling or blackouts.     Activity as tolerated     Call MD for:  temperature >100.4     Call MD for:   increased confusion or weakness     Call MD for:  persistent nausea and vomiting or diarrhea     Call MD for:  persistent dizziness, light-headedness, or visual disturbances     Call MD for:  severe persistent headache     Call MD for:  worsening rash     Call MD for:  redness, tenderness, or signs of infection (pain, swelling, redness, odor or green/yellow discharge around incision site)     Call MD for:  severe uncontrolled pain     Call MD for:  difficulty breathing or increased cough       Significant Diagnostic Studies: Labs: CMP   Recent Labs   Lab 03/06/25  0706 03/07/25  0458    135*   K 3.7 4.0    102   CO2 31* 29   * 118*   BUN 9 9   CREATININE 0.9 0.9   CALCIUM 8.7 8.6*   PROT 5.8* 5.7*   ALBUMIN 3.2* 3.3*   BILITOT 0.7 0.6   ALKPHOS 66 61   AST 10 11   ALT 12 10   ANIONGAP 5* 4*    and CBC   Recent Labs   Lab 03/06/25  0707 03/07/25 0458   WBC 11.40 9.52   HGB 8.4* 8.1*   HCT 26.7* 26.2*    196       Pending Diagnostic Studies:       None           Imaging Results              MRI Brain Without Contrast (Final result)  Result time 03/05/25 15:23:16      Final result by Kelton Wells IV, MD (03/05/25 15:23:16)                   Impression:      Significant degradation by patient motion.    No acute abnormality.      Electronically signed by: Kelton Wells  Date:    03/05/2025  Time:    15:23               Narrative:    EXAMINATION:  MRI BRAIN WITHOUT CONTRAST    CLINICAL HISTORY:  Stroke, follow up;Triage MRI for Stroke assessment;    TECHNIQUE:  Multiplanar noncontrast imaging is performed.    COMPARISON:  None.    FINDINGS:  There is significant degradation of the examination by patient motion.    Given this limitation, there are no findings of acute hemorrhage or infarction.  There is no restricted diffusion.    There is no evidence of an intra-axial mass, mass effect or shift of the midline.    The ventricular system is appropriate in size and position for age. Appropriate  flow voids are present within the major blood vessels.    The skull base and craniocervical junction appear unremarkable.                                       X-Ray Chest AP Portable (Final result)  Result time 03/05/25 14:26:13      Final result by Consuelo Shea MD (03/05/25 14:26:13)                   Impression:      Mild cardiomegaly with no acute pulmonary process      Electronically signed by: Consuelo Shea  Date:    03/05/2025  Time:    14:26               Narrative:    EXAMINATION:  XR CHEST AP PORTABLE    CLINICAL HISTORY:  Stroke;    FINDINGS:  Portable chest at 14:15 hours is compared to 02/28/2025 shows cardiac silhouette is upper limits of normal in size.    There are no confluent infiltrates or pleural effusions.  Pulmonary vasculature is normal. No acute osseous abnormality.                                       CTA Head and Neck (xpd) (Final result)  Result time 03/05/25 13:54:03      Final result by Consuelo Shea MD (03/05/25 13:54:03)                   Impression:      Normal CTA of the head and neck    Minimal calcified plaque at the carotid bifurcations right greater than left      Electronically signed by: Consuelo Shea  Date:    03/05/2025  Time:    13:54               Narrative:    EXAMINATION:  CTA HEAD AND NECK (XPD)    CLINICAL HISTORY:  Neuro deficit, acute, stroke suspected;    TECHNIQUE:  Non contrast low dose axial images were obtained through the head. CT angiogram was performed from the level of the greg to the top of the head following the IV administration of 100mL of Omnipaque 350.   Sagittal and coronal reconstructions and maximum intensity projection reconstructions were performed. Arterial stenosis percentages are based on NASCET measurement criteria.    COMPARISON:  11/01/2024    FINDINGS:  Extracranial:    Aorta and great vessels: Left-sided aortic arch with normal configuration.   No evidence of stenosis of the origins of the great vessels from the  arch.    Subclavian arteries: The subclavian arteries are without hemodynamically significant stenosis or occlusion.    Right carotid: The right common carotid artery is without significant stenosis. Calcified plaque at the right carotid bulb the right internal carotid artery is without significant stenosis, occlusion, or dissection.    Left carotid: The left common carotid artery is without significant stenosis. Minimal calcified plaque at the bifurcation the left internal carotid artery is without significant stenosis, occlusion, or dissection.    Extracranial vertebral arteries: Left vertebral artery is dominant the vertebral arteries are without significant stenosis, occlusion, or dissection to the skull base.    Intracranial:    Anterior circulation: Mild calcified plaque in the cavernous carotid arteries no areas of significant atherosclerotic narrowing or filling defects are identified.  The middle cerebral arteries are normal.  The anterior cerebral arteries are normal.    Posterior circulation: The vertebral arteries are normal. The basilar artery is normal. The posterior cerebral arteries are normal.    No evidence of aneurysm or arteriovenous malformation.    Dural venous sinuses are patent.    Other:    Paraspinous soft tissues are normal.  There is no adenopathy.    The visualized portions of the lung apices are unremarkable.    There are degenerative spine changes. No concerning osseous lesions identified.                                       CT HEAD FOR STROKE (Final result)  Result time 03/05/25 13:28:31      Final result by Consuelo Shea MD (03/05/25 13:28:31)                   Impression:      No acute intracranial process    These findings were called to Dr. Broderick in the emergency department at 13:28      Electronically signed by: Consuelo Shea  Date:    03/05/2025  Time:    13:28               Narrative:    CLINICAL HISTORY:  ([id number])49 y/o  (1975) M    Neuro deficit, acute,  stroke suspected;    TECHNIQUE:  (A#[id number], exam time 3/5/2025 13:23)    CT HEAD FOR STROKE [id number]    Axial CT of the brain without contrast using soft tissue and bone algorithm. None.    CMS MANDATED QUALITY DATA - CT RADIATION - 436    All CT scans at this facility utilize dose modulation, iterative reconstruction, and/or weight based dosing when appropriate to reduce radiation dose to as low as reasonably achievable.    COMPARISON:  MRI brain dated 11/01/2024    FINDINGS:  No acute intracranial hemorrhage, edema or mass effect, and no acute parenchymal abnormality. There is no hydrocephalus, herniation or midline shift, and the basal and suprasellar cisterns are within normal limits. The osseous structures show no acute skull fracture. The ventricles and sulci are normal. There is normal gray white differentiation. Orbital contents appear within normal limits. External auditory canals are unremarkable. The visualized paranasal sinuses and mastoid air cells are essentially clear.                                      Medications:  Reconciled Home Medications:      Medication List        START taking these medications      ketorolac 10 mg tablet  Commonly known as: TORADOL  Take 1 tablet (10 mg total) by mouth every 6 (six) hours as needed for Pain.     methocarbamoL 750 MG Tab  Commonly known as: ROBAXIN  Take 1 tablet (750 mg total) by mouth 4 (four) times daily as needed (muscle spasm).     predniSONE 20 MG tablet  Commonly known as: DELTASONE  Take 3 tablets (60 mg total) by mouth once daily. for 4 days  Start taking on: March 8, 2025            CONTINUE taking these medications      busPIRone 15 MG tablet  Commonly known as: BUSPAR  Take 1 tablet (15 mg total) by mouth 3 (three) times daily.     diazePAM 5 MG tablet  Commonly known as: VALIUM  Take 1 tablet (5 mg total) by mouth daily as needed for Anxiety.     ergocalciferol 50,000 unit Cap  Commonly known as: ERGOCALCIFEROL  Take 1 capsule by mouth every  Sunday.     EScitalopram oxalate 20 MG tablet  Commonly known as: LEXAPRO  Take 1 tablet (20 mg total) by mouth once daily.     metoprolol succinate 50 MG 24 hr tablet  Commonly known as: TOPROL-XL  Take 1 tablet (50 mg total) by mouth once daily.     oxyCODONE 10 mg Tab immediate release tablet  Commonly known as: ROXICODONE  Take 1 tablet (10 mg total) by mouth every 6 (six) hours as needed for Pain.     pantoprazole 40 MG tablet  Commonly known as: PROTONIX  Take 40 mg by mouth 2 (two) times daily.     pregabalin 50 MG capsule  Commonly known as: LYRICA  Take 1 capsule by mouth 2 (two) times daily.     promethazine 25 MG tablet  Commonly known as: PHENERGAN  Take 25 mg by mouth 4 (four) times daily as needed for Nausea.     simvastatin 20 MG tablet  Commonly known as: ZOCOR  Take 1 tablet (20 mg total) by mouth every evening.     XELJANZ 10 mg Tab  Generic drug: tofacitinib  Take 10 mg by mouth 2 (two) times daily.     zolpidem 10 mg Tab  Commonly known as: AMBIEN  Take 1 tablet (10 mg total) by mouth nightly as needed (insomnia).            ASK your doctor about these medications      budesonide 3 mg capsule  Commonly known as: ENTOCORT EC  Take 3 capsules (9 mg total) by mouth once daily.     VSL#3 112.5 billion cell Cap  Generic drug: Lactobac 2-Bifido 1-S. therm  Take 1 capsule by mouth twice a day              Indwelling Lines/Drains at time of discharge:   Lines/Drains/Airways       None                   Time spent on the discharge of patient: 35 minutes         Adrian Estrada DO  Department of Hospital Medicine  Counts include 234 beds at the [facility]
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
## 2025-03-07 NOTE — ANESTHESIA POSTPROCEDURE EVALUATION
Anesthesia Post Evaluation    Patient: Jacoby Jean-Baptiste    Procedure(s) Performed: Procedure(s) (LRB):  MRI (MAGNETIC RESONANCE IMAGING) (N/A)    Final Anesthesia Type: general      Patient location during evaluation: PACU  Patient participation: Yes- Able to Participate  Level of consciousness: awake and alert, oriented and awake  Post-procedure vital signs: reviewed and stable  Pain management: adequate  Airway patency: patent  LOLIS mitigation strategies: Extubation while patient is awake, Verification of full reversal of neuromuscular block and Extubation and recovery carried out in lateral, semiupright, or other nonsupine position  PONV status at discharge: No PONV  Anesthetic complications: no      Cardiovascular status: blood pressure returned to baseline, hemodynamically stable and stable  Respiratory status: unassisted, spontaneous ventilation and room air  Hydration status: euvolemic  Follow-up not needed.              Vitals Value Taken Time   /61 03/07/25 09:36   Temp 36.9 °C (98.5 °F) 03/07/25 09:24   Pulse 106 03/07/25 09:39   Resp 20 03/07/25 09:35   SpO2 94 % 03/07/25 09:39   Vitals shown include unfiled device data.      No case tracking events are documented in the log.      Pain/Kusum Score: Pain Rating Prior to Med Admin: 8 (3/7/2025  2:11 AM)  Pain Rating Post Med Admin: 5 (3/7/2025  3:11 AM)  Kusum Score: 10 (3/7/2025  9:35 AM)

## 2025-03-09 ENCOUNTER — HOSPITAL ENCOUNTER (INPATIENT)
Facility: HOSPITAL | Age: 50
LOS: 8 days | Discharge: HOME HEALTH | DRG: 552 | End: 2025-03-17
Admitting: EMERGENCY MEDICINE
Payer: COMMERCIAL

## 2025-03-09 DIAGNOSIS — E11.9 TYPE 2 DIABETES MELLITUS WITHOUT COMPLICATION, WITHOUT LONG-TERM CURRENT USE OF INSULIN: ICD-10-CM

## 2025-03-09 DIAGNOSIS — R07.9 CHEST PAIN: ICD-10-CM

## 2025-03-09 DIAGNOSIS — K51.011 ULCERATIVE PANCOLITIS WITH RECTAL BLEEDING: ICD-10-CM

## 2025-03-09 DIAGNOSIS — R52 INTRACTABLE PAIN: Primary | ICD-10-CM

## 2025-03-09 DIAGNOSIS — K51.90 EXACERBATION OF ULCERATIVE COLITIS WITHOUT COMPLICATION: ICD-10-CM

## 2025-03-09 PROBLEM — Z71.89 ACP (ADVANCE CARE PLANNING): Status: RESOLVED | Noted: 2025-03-09 | Resolved: 2025-03-09

## 2025-03-09 PROBLEM — Z71.89 ACP (ADVANCE CARE PLANNING): Status: ACTIVE | Noted: 2025-03-09

## 2025-03-09 LAB
ALBUMIN SERPL BCP-MCNC: 3.6 G/DL (ref 3.5–5.2)
ALP SERPL-CCNC: 66 U/L (ref 55–135)
ALT SERPL W/O P-5'-P-CCNC: 12 U/L (ref 10–44)
ANION GAP SERPL CALC-SCNC: 8 MMOL/L (ref 8–16)
AST SERPL-CCNC: 11 U/L (ref 10–40)
BASOPHILS # BLD AUTO: 0.01 K/UL (ref 0–0.2)
BASOPHILS NFR BLD: 0.1 % (ref 0–1.9)
BILIRUB SERPL-MCNC: 0.4 MG/DL (ref 0.1–1)
BUN SERPL-MCNC: 17 MG/DL (ref 6–20)
CALCIUM SERPL-MCNC: 9.1 MG/DL (ref 8.7–10.5)
CHLORIDE SERPL-SCNC: 102 MMOL/L (ref 95–110)
CO2 SERPL-SCNC: 30 MMOL/L (ref 23–29)
CREAT SERPL-MCNC: 1 MG/DL (ref 0.5–1.4)
DIFFERENTIAL METHOD BLD: ABNORMAL
EOSINOPHIL # BLD AUTO: 0 K/UL (ref 0–0.5)
EOSINOPHIL NFR BLD: 0 % (ref 0–8)
ERYTHROCYTE [DISTWIDTH] IN BLOOD BY AUTOMATED COUNT: 20.6 % (ref 11.5–14.5)
EST. GFR  (NO RACE VARIABLE): >60 ML/MIN/1.73 M^2
GLUCOSE SERPL-MCNC: 140 MG/DL (ref 70–110)
HCT VFR BLD AUTO: 30.6 % (ref 40–54)
HGB BLD-MCNC: 9.3 G/DL (ref 14–18)
IMM GRANULOCYTES # BLD AUTO: 0.03 K/UL (ref 0–0.04)
IMM GRANULOCYTES NFR BLD AUTO: 0.3 % (ref 0–0.5)
LYMPHOCYTES # BLD AUTO: 0.4 K/UL (ref 1–4.8)
LYMPHOCYTES NFR BLD: 4.5 % (ref 18–48)
MCH RBC QN AUTO: 27.8 PG (ref 27–31)
MCHC RBC AUTO-ENTMCNC: 30.4 G/DL (ref 32–36)
MCV RBC AUTO: 92 FL (ref 82–98)
MONOCYTES # BLD AUTO: 0.4 K/UL (ref 0.3–1)
MONOCYTES NFR BLD: 4 % (ref 4–15)
NEUTROPHILS # BLD AUTO: 7.9 K/UL (ref 1.8–7.7)
NEUTROPHILS NFR BLD: 91.1 % (ref 38–73)
NRBC BLD-RTO: 0 /100 WBC
PLATELET # BLD AUTO: 264 K/UL (ref 150–450)
PMV BLD AUTO: 11.2 FL (ref 9.2–12.9)
POTASSIUM SERPL-SCNC: 4.5 MMOL/L (ref 3.5–5.1)
PROT SERPL-MCNC: 6.8 G/DL (ref 6–8.4)
RBC # BLD AUTO: 3.34 M/UL (ref 4.6–6.2)
SODIUM SERPL-SCNC: 140 MMOL/L (ref 136–145)
WBC # BLD AUTO: 8.66 K/UL (ref 3.9–12.7)

## 2025-03-09 PROCEDURE — 63600175 PHARM REV CODE 636 W HCPCS: Performed by: EMERGENCY MEDICINE

## 2025-03-09 PROCEDURE — 96374 THER/PROPH/DIAG INJ IV PUSH: CPT

## 2025-03-09 PROCEDURE — 11000001 HC ACUTE MED/SURG PRIVATE ROOM

## 2025-03-09 PROCEDURE — 80053 COMPREHEN METABOLIC PANEL: CPT

## 2025-03-09 PROCEDURE — 63600175 PHARM REV CODE 636 W HCPCS: Mod: TB,JZ

## 2025-03-09 PROCEDURE — 99285 EMERGENCY DEPT VISIT HI MDM: CPT | Mod: 25

## 2025-03-09 PROCEDURE — 96376 TX/PRO/DX INJ SAME DRUG ADON: CPT

## 2025-03-09 PROCEDURE — 85025 COMPLETE CBC W/AUTO DIFF WBC: CPT

## 2025-03-09 RX ORDER — OXYCODONE HYDROCHLORIDE 5 MG/1
10 TABLET ORAL EVERY 6 HOURS PRN
Status: DISCONTINUED | OUTPATIENT
Start: 2025-03-09 | End: 2025-03-12

## 2025-03-09 RX ORDER — INSULIN ASPART 100 [IU]/ML
0-10 INJECTION, SOLUTION INTRAVENOUS; SUBCUTANEOUS
Status: DISCONTINUED | OUTPATIENT
Start: 2025-03-09 | End: 2025-03-17 | Stop reason: HOSPADM

## 2025-03-09 RX ORDER — ATORVASTATIN CALCIUM 10 MG/1
10 TABLET, FILM COATED ORAL DAILY
Status: DISCONTINUED | OUTPATIENT
Start: 2025-03-10 | End: 2025-03-17 | Stop reason: HOSPADM

## 2025-03-09 RX ORDER — ZOLPIDEM TARTRATE 5 MG/1
10 TABLET ORAL NIGHTLY PRN
Status: DISCONTINUED | OUTPATIENT
Start: 2025-03-09 | End: 2025-03-17 | Stop reason: HOSPADM

## 2025-03-09 RX ORDER — KETOROLAC TROMETHAMINE 10 MG/1
10 TABLET, FILM COATED ORAL EVERY 6 HOURS PRN
Status: DISCONTINUED | OUTPATIENT
Start: 2025-03-09 | End: 2025-03-11

## 2025-03-09 RX ORDER — DIAZEPAM 5 MG/1
5 TABLET ORAL DAILY PRN
Status: DISCONTINUED | OUTPATIENT
Start: 2025-03-09 | End: 2025-03-17 | Stop reason: HOSPADM

## 2025-03-09 RX ORDER — IBUPROFEN 200 MG
16 TABLET ORAL
Status: DISCONTINUED | OUTPATIENT
Start: 2025-03-09 | End: 2025-03-17 | Stop reason: HOSPADM

## 2025-03-09 RX ORDER — ONDANSETRON HYDROCHLORIDE 2 MG/ML
8 INJECTION, SOLUTION INTRAVENOUS EVERY 6 HOURS PRN
Status: DISCONTINUED | OUTPATIENT
Start: 2025-03-09 | End: 2025-03-17 | Stop reason: HOSPADM

## 2025-03-09 RX ORDER — PREDNISONE 20 MG/1
60 TABLET ORAL DAILY
Status: DISCONTINUED | OUTPATIENT
Start: 2025-03-10 | End: 2025-03-13

## 2025-03-09 RX ORDER — METOPROLOL SUCCINATE 50 MG/1
50 TABLET, EXTENDED RELEASE ORAL DAILY
Status: DISCONTINUED | OUTPATIENT
Start: 2025-03-10 | End: 2025-03-17 | Stop reason: HOSPADM

## 2025-03-09 RX ORDER — HYDROMORPHONE HYDROCHLORIDE 1 MG/ML
1 INJECTION, SOLUTION INTRAMUSCULAR; INTRAVENOUS; SUBCUTANEOUS
Refills: 0 | Status: COMPLETED | OUTPATIENT
Start: 2025-03-09 | End: 2025-03-09

## 2025-03-09 RX ORDER — SODIUM,POTASSIUM PHOSPHATES 280-250MG
2 POWDER IN PACKET (EA) ORAL
Status: DISCONTINUED | OUTPATIENT
Start: 2025-03-09 | End: 2025-03-17 | Stop reason: HOSPADM

## 2025-03-09 RX ORDER — METHOCARBAMOL 750 MG/1
750 TABLET, FILM COATED ORAL 4 TIMES DAILY PRN
Status: DISCONTINUED | OUTPATIENT
Start: 2025-03-09 | End: 2025-03-12

## 2025-03-09 RX ORDER — POLYETHYLENE GLYCOL 3350 17 G/17G
17 POWDER, FOR SOLUTION ORAL DAILY
Status: DISCONTINUED | OUTPATIENT
Start: 2025-03-10 | End: 2025-03-11

## 2025-03-09 RX ORDER — LANOLIN ALCOHOL/MO/W.PET/CERES
800 CREAM (GRAM) TOPICAL
Status: DISCONTINUED | OUTPATIENT
Start: 2025-03-09 | End: 2025-03-17 | Stop reason: HOSPADM

## 2025-03-09 RX ORDER — PREGABALIN 25 MG/1
50 CAPSULE ORAL 2 TIMES DAILY
Status: DISCONTINUED | OUTPATIENT
Start: 2025-03-09 | End: 2025-03-17 | Stop reason: HOSPADM

## 2025-03-09 RX ORDER — SODIUM CHLORIDE 0.9 % (FLUSH) 0.9 %
3 SYRINGE (ML) INJECTION EVERY 12 HOURS PRN
Status: DISCONTINUED | OUTPATIENT
Start: 2025-03-09 | End: 2025-03-17 | Stop reason: HOSPADM

## 2025-03-09 RX ORDER — ALUMINUM HYDROXIDE, MAGNESIUM HYDROXIDE, AND SIMETHICONE 1200; 120; 1200 MG/30ML; MG/30ML; MG/30ML
30 SUSPENSION ORAL 4 TIMES DAILY PRN
Status: DISCONTINUED | OUTPATIENT
Start: 2025-03-09 | End: 2025-03-17 | Stop reason: HOSPADM

## 2025-03-09 RX ORDER — HYDROMORPHONE HYDROCHLORIDE 1 MG/ML
0.5 INJECTION, SOLUTION INTRAMUSCULAR; INTRAVENOUS; SUBCUTANEOUS
Refills: 0 | Status: COMPLETED | OUTPATIENT
Start: 2025-03-09 | End: 2025-03-09

## 2025-03-09 RX ORDER — NALOXONE HCL 0.4 MG/ML
0.02 VIAL (ML) INJECTION
Status: DISCONTINUED | OUTPATIENT
Start: 2025-03-09 | End: 2025-03-17 | Stop reason: HOSPADM

## 2025-03-09 RX ORDER — HYDROMORPHONE HYDROCHLORIDE 1 MG/ML
1 INJECTION, SOLUTION INTRAMUSCULAR; INTRAVENOUS; SUBCUTANEOUS EVERY 4 HOURS PRN
Refills: 0 | Status: DISCONTINUED | OUTPATIENT
Start: 2025-03-09 | End: 2025-03-13

## 2025-03-09 RX ORDER — SIMETHICONE 80 MG
1 TABLET,CHEWABLE ORAL 4 TIMES DAILY PRN
Status: DISCONTINUED | OUTPATIENT
Start: 2025-03-09 | End: 2025-03-17 | Stop reason: HOSPADM

## 2025-03-09 RX ORDER — IBUPROFEN 200 MG
24 TABLET ORAL
Status: DISCONTINUED | OUTPATIENT
Start: 2025-03-09 | End: 2025-03-17 | Stop reason: HOSPADM

## 2025-03-09 RX ORDER — ACETAMINOPHEN 325 MG/1
650 TABLET ORAL EVERY 4 HOURS PRN
Status: DISCONTINUED | OUTPATIENT
Start: 2025-03-09 | End: 2025-03-17 | Stop reason: HOSPADM

## 2025-03-09 RX ORDER — TALC
9 POWDER (GRAM) TOPICAL NIGHTLY PRN
Status: DISCONTINUED | OUTPATIENT
Start: 2025-03-09 | End: 2025-03-17 | Stop reason: HOSPADM

## 2025-03-09 RX ORDER — PANTOPRAZOLE SODIUM 40 MG/1
40 TABLET, DELAYED RELEASE ORAL 2 TIMES DAILY
Status: DISCONTINUED | OUTPATIENT
Start: 2025-03-09 | End: 2025-03-17 | Stop reason: HOSPADM

## 2025-03-09 RX ORDER — ESCITALOPRAM OXALATE 10 MG/1
20 TABLET ORAL DAILY
Status: DISCONTINUED | OUTPATIENT
Start: 2025-03-10 | End: 2025-03-17 | Stop reason: HOSPADM

## 2025-03-09 RX ORDER — ENOXAPARIN SODIUM 100 MG/ML
40 INJECTION SUBCUTANEOUS EVERY 24 HOURS
Status: DISCONTINUED | OUTPATIENT
Start: 2025-03-09 | End: 2025-03-09

## 2025-03-09 RX ORDER — GLUCAGON 1 MG
1 KIT INJECTION
Status: DISCONTINUED | OUTPATIENT
Start: 2025-03-09 | End: 2025-03-17 | Stop reason: HOSPADM

## 2025-03-09 RX ORDER — ACETAMINOPHEN 325 MG/1
650 TABLET ORAL EVERY 8 HOURS PRN
Status: DISCONTINUED | OUTPATIENT
Start: 2025-03-09 | End: 2025-03-17 | Stop reason: HOSPADM

## 2025-03-09 RX ORDER — ENOXAPARIN SODIUM 100 MG/ML
40 INJECTION SUBCUTANEOUS EVERY 12 HOURS
Status: DISCONTINUED | OUTPATIENT
Start: 2025-03-09 | End: 2025-03-17 | Stop reason: HOSPADM

## 2025-03-09 RX ADMIN — ENOXAPARIN SODIUM 40 MG: 40 INJECTION SUBCUTANEOUS at 10:03

## 2025-03-09 RX ADMIN — HYDROMORPHONE HYDROCHLORIDE 1 MG: 1 INJECTION, SOLUTION INTRAMUSCULAR; INTRAVENOUS; SUBCUTANEOUS at 05:03

## 2025-03-09 RX ADMIN — HYDROMORPHONE HYDROCHLORIDE 0.5 MG: 1 INJECTION, SOLUTION INTRAMUSCULAR; INTRAVENOUS; SUBCUTANEOUS at 07:03

## 2025-03-10 ENCOUNTER — TELEPHONE (OUTPATIENT)
Dept: FAMILY MEDICINE | Facility: CLINIC | Age: 50
End: 2025-03-10
Payer: COMMERCIAL

## 2025-03-10 LAB
ALBUMIN SERPL BCP-MCNC: 2.9 G/DL (ref 3.5–5.2)
ALP SERPL-CCNC: 64 U/L (ref 40–150)
ALT SERPL W/O P-5'-P-CCNC: 13 U/L (ref 10–44)
ANION GAP SERPL CALC-SCNC: 8 MMOL/L (ref 8–16)
AST SERPL-CCNC: 11 U/L (ref 10–40)
BASOPHILS # BLD AUTO: 0 K/UL (ref 0–0.2)
BASOPHILS NFR BLD: 0 % (ref 0–1.9)
BILIRUB SERPL-MCNC: 0.4 MG/DL (ref 0.1–1)
BILIRUB UR QL STRIP: NEGATIVE
BUN SERPL-MCNC: 16 MG/DL (ref 6–20)
CALCIUM SERPL-MCNC: 8.6 MG/DL (ref 8.7–10.5)
CHLORIDE SERPL-SCNC: 103 MMOL/L (ref 95–110)
CLARITY UR: CLEAR
CO2 SERPL-SCNC: 26 MMOL/L (ref 23–29)
COLOR UR: YELLOW
CREAT SERPL-MCNC: 0.9 MG/DL (ref 0.5–1.4)
DIFFERENTIAL METHOD BLD: ABNORMAL
EOSINOPHIL # BLD AUTO: 0 K/UL (ref 0–0.5)
EOSINOPHIL NFR BLD: 0.1 % (ref 0–8)
ERYTHROCYTE [DISTWIDTH] IN BLOOD BY AUTOMATED COUNT: 20 % (ref 11.5–14.5)
EST. GFR  (NO RACE VARIABLE): >60 ML/MIN/1.73 M^2
GLUCOSE SERPL-MCNC: 106 MG/DL (ref 70–110)
GLUCOSE UR QL STRIP: NEGATIVE
HCT VFR BLD AUTO: 27.1 % (ref 40–54)
HGB BLD-MCNC: 8.3 G/DL (ref 14–18)
HGB UR QL STRIP: NEGATIVE
IMM GRANULOCYTES # BLD AUTO: 0.04 K/UL (ref 0–0.04)
IMM GRANULOCYTES NFR BLD AUTO: 0.4 % (ref 0–0.5)
KETONES UR QL STRIP: NEGATIVE
LEUKOCYTE ESTERASE UR QL STRIP: NEGATIVE
LYMPHOCYTES # BLD AUTO: 0.9 K/UL (ref 1–4.8)
LYMPHOCYTES NFR BLD: 9.8 % (ref 18–48)
MAGNESIUM SERPL-MCNC: 1.6 MG/DL (ref 1.6–2.6)
MCH RBC QN AUTO: 28 PG (ref 27–31)
MCHC RBC AUTO-ENTMCNC: 30.6 G/DL (ref 32–36)
MCV RBC AUTO: 92 FL (ref 82–98)
MONOCYTES # BLD AUTO: 1.1 K/UL (ref 0.3–1)
MONOCYTES NFR BLD: 11 % (ref 4–15)
NEUTROPHILS # BLD AUTO: 7.6 K/UL (ref 1.8–7.7)
NEUTROPHILS NFR BLD: 78.7 % (ref 38–73)
NITRITE UR QL STRIP: NEGATIVE
NRBC BLD-RTO: 0 /100 WBC
PH UR STRIP: 6 [PH] (ref 5–8)
PHOSPHATE SERPL-MCNC: 3.5 MG/DL (ref 2.7–4.5)
PLATELET # BLD AUTO: 233 K/UL (ref 150–450)
PMV BLD AUTO: 10.6 FL (ref 9.2–12.9)
POCT GLUCOSE: 112 MG/DL (ref 70–110)
POCT GLUCOSE: 164 MG/DL (ref 70–110)
POCT GLUCOSE: 168 MG/DL (ref 70–110)
POCT GLUCOSE: 94 MG/DL (ref 70–110)
POTASSIUM SERPL-SCNC: 4.6 MMOL/L (ref 3.5–5.1)
PROT SERPL-MCNC: 6.1 G/DL (ref 6–8.4)
PROT UR QL STRIP: NEGATIVE
RBC # BLD AUTO: 2.96 M/UL (ref 4.6–6.2)
SODIUM SERPL-SCNC: 137 MMOL/L (ref 136–145)
SP GR UR STRIP: 1.02 (ref 1–1.03)
URN SPEC COLLECT METH UR: ABNORMAL
UROBILINOGEN UR STRIP-ACNC: ABNORMAL EU/DL
WBC # BLD AUTO: 9.6 K/UL (ref 3.9–12.7)

## 2025-03-10 PROCEDURE — 63600175 PHARM REV CODE 636 W HCPCS: Performed by: EMERGENCY MEDICINE

## 2025-03-10 PROCEDURE — 63600175 PHARM REV CODE 636 W HCPCS: Mod: TB,JZ | Performed by: NURSE PRACTITIONER

## 2025-03-10 PROCEDURE — 81003 URINALYSIS AUTO W/O SCOPE: CPT

## 2025-03-10 PROCEDURE — 83735 ASSAY OF MAGNESIUM: CPT | Performed by: NURSE PRACTITIONER

## 2025-03-10 PROCEDURE — 84100 ASSAY OF PHOSPHORUS: CPT | Performed by: NURSE PRACTITIONER

## 2025-03-10 PROCEDURE — 25000003 PHARM REV CODE 250: Performed by: NURSE PRACTITIONER

## 2025-03-10 PROCEDURE — 97161 PT EVAL LOW COMPLEX 20 MIN: CPT

## 2025-03-10 PROCEDURE — 11000001 HC ACUTE MED/SURG PRIVATE ROOM

## 2025-03-10 PROCEDURE — 36415 COLL VENOUS BLD VENIPUNCTURE: CPT | Performed by: NURSE PRACTITIONER

## 2025-03-10 PROCEDURE — 85025 COMPLETE CBC W/AUTO DIFF WBC: CPT | Performed by: NURSE PRACTITIONER

## 2025-03-10 PROCEDURE — 97166 OT EVAL MOD COMPLEX 45 MIN: CPT

## 2025-03-10 PROCEDURE — 80053 COMPREHEN METABOLIC PANEL: CPT | Performed by: NURSE PRACTITIONER

## 2025-03-10 RX ADMIN — ZOLPIDEM TARTRATE 10 MG: 5 TABLET ORAL at 08:03

## 2025-03-10 RX ADMIN — PREDNISONE 60 MG: 20 TABLET ORAL at 09:03

## 2025-03-10 RX ADMIN — HYDROMORPHONE HYDROCHLORIDE 1 MG: 1 INJECTION, SOLUTION INTRAMUSCULAR; INTRAVENOUS; SUBCUTANEOUS at 12:03

## 2025-03-10 RX ADMIN — HYDROMORPHONE HYDROCHLORIDE 1 MG: 1 INJECTION, SOLUTION INTRAMUSCULAR; INTRAVENOUS; SUBCUTANEOUS at 08:03

## 2025-03-10 RX ADMIN — DIAZEPAM 5 MG: 5 TABLET ORAL at 02:03

## 2025-03-10 RX ADMIN — OXYCODONE HYDROCHLORIDE 10 MG: 5 TABLET ORAL at 02:03

## 2025-03-10 RX ADMIN — KETOROLAC TROMETHAMINE 10 MG: 10 TABLET, FILM COATED ORAL at 07:03

## 2025-03-10 RX ADMIN — PANTOPRAZOLE SODIUM 40 MG: 40 TABLET, DELAYED RELEASE ORAL at 08:03

## 2025-03-10 RX ADMIN — POLYETHYLENE GLYCOL (3350) 17 G: 17 POWDER, FOR SOLUTION ORAL at 09:03

## 2025-03-10 RX ADMIN — PANTOPRAZOLE SODIUM 40 MG: 40 TABLET, DELAYED RELEASE ORAL at 12:03

## 2025-03-10 RX ADMIN — PREGABALIN 50 MG: 25 CAPSULE ORAL at 12:03

## 2025-03-10 RX ADMIN — OXYCODONE HYDROCHLORIDE 10 MG: 5 TABLET ORAL at 09:03

## 2025-03-10 RX ADMIN — HYDROMORPHONE HYDROCHLORIDE 1 MG: 1 INJECTION, SOLUTION INTRAMUSCULAR; INTRAVENOUS; SUBCUTANEOUS at 04:03

## 2025-03-10 RX ADMIN — BUSPIRONE HYDROCHLORIDE 15 MG: 7.5 TABLET ORAL at 09:03

## 2025-03-10 RX ADMIN — METOPROLOL SUCCINATE 50 MG: 50 TABLET, EXTENDED RELEASE ORAL at 09:03

## 2025-03-10 RX ADMIN — ESCITALOPRAM OXALATE 20 MG: 10 TABLET, FILM COATED ORAL at 09:03

## 2025-03-10 RX ADMIN — ENOXAPARIN SODIUM 40 MG: 40 INJECTION SUBCUTANEOUS at 08:03

## 2025-03-10 RX ADMIN — BUSPIRONE HYDROCHLORIDE 15 MG: 7.5 TABLET ORAL at 08:03

## 2025-03-10 RX ADMIN — METHOCARBAMOL TABLETS 750 MG: 750 TABLET, COATED ORAL at 02:03

## 2025-03-10 RX ADMIN — BUSPIRONE HYDROCHLORIDE 15 MG: 7.5 TABLET ORAL at 02:03

## 2025-03-10 RX ADMIN — PANTOPRAZOLE SODIUM 40 MG: 40 TABLET, DELAYED RELEASE ORAL at 09:03

## 2025-03-10 RX ADMIN — METHOCARBAMOL TABLETS 750 MG: 750 TABLET, COATED ORAL at 08:03

## 2025-03-10 RX ADMIN — ENOXAPARIN SODIUM 40 MG: 40 INJECTION SUBCUTANEOUS at 09:03

## 2025-03-10 RX ADMIN — ZOLPIDEM TARTRATE 10 MG: 5 TABLET ORAL at 12:03

## 2025-03-10 RX ADMIN — ATORVASTATIN CALCIUM 10 MG: 10 TABLET, FILM COATED ORAL at 09:03

## 2025-03-10 RX ADMIN — PREGABALIN 50 MG: 25 CAPSULE ORAL at 08:03

## 2025-03-10 RX ADMIN — HYDROMORPHONE HYDROCHLORIDE 1 MG: 1 INJECTION, SOLUTION INTRAMUSCULAR; INTRAVENOUS; SUBCUTANEOUS at 11:03

## 2025-03-10 RX ADMIN — PREGABALIN 50 MG: 25 CAPSULE ORAL at 09:03

## 2025-03-10 RX ADMIN — ONDANSETRON 8 MG: 2 INJECTION INTRAMUSCULAR; INTRAVENOUS at 06:03

## 2025-03-10 NOTE — PLAN OF CARE
Referral sent to [facility] for review       03/10/25 1959   Post-Acute Status   Post-Acute Authorization Placement   Post-Acute Placement Status Referrals Sent   Patient choice form signed by patient/caregiver List from [facility]

## 2025-03-10 NOTE — PT/OT/SLP EVAL
Physical Therapy Evaluation    Patient Name:  Jacoby Jean-Baptiste   MRN:  [id number]    Recommendations:     Discharge Recommendations: High Intensity Therapy   Discharge Equipment Recommendations: none   Barriers to discharge: Decreased caregiver support    Assessment:     Jacoby Jean-Baptiste is a 50 y.o. male admitted with a medical diagnosis of Intractable pain.  He presents with the following impairments/functional limitations: weakness, impaired endurance, impaired functional mobility, gait instability, impaired balance, decreased lower extremity function, pain, orthopedic precautions .    Pt seen supine in bed- stated of being in pain- back and RLE weakness.  Pt with T12 and L1 fx. Pt requiring min assist mobility and able to ambulate within room with RW 30ft. Pt has TLSO at bedside but stated that it does not fit well and request not to use.  Pt ambulates with a slow and guarded gait patterns.  Back to bed per request. High fall risk  HIT.    Rehab Prognosis: Fair; patient would benefit from acute skilled PT services to address these deficits and reach maximum level of function.    Recent Surgery: * No surgery found *      Plan:     During this hospitalization, patient to be seen 6 x/week to address the identified rehab impairments via gait training, therapeutic activities, therapeutic exercises and progress toward the following goals:    Plan of Care Expires:  03/30/25    Subjective   Stated is home alone and unable to care for self  Chief Complaint: back pain and RLE weakness  Patient/Family Comments/goals: get well  Pain/Comfort:  Pain Rating 1: 8/10  Location 1: back  Pain Addressed 1: Pre-medicate for activity, Reposition, Distraction, Cessation of Activity    Patients cultural, spiritual, Zoroastrian conflicts given the current situation:      Living Environment:  Home alone  Prior to admission, patients level of function was ambulatory but limited distance.  Equipment used at home: walker, rolling.  DME owned  (not currently used): none.  Upon discharge, patient will have assistance from family.    Objective:     Communicated with nurse Limon prior to session.  Patient found HOB elevated with peripheral IV, telemetry  upon PT entry to room.    General Precautions: Standard, special contact, fall  Orthopedic Precautions:    Braces: TLSO  Respiratory Status: Room air    Exams:  Postural Exam:  Patient presented with the following abnormalities:    -       Rounded shoulders  -       Forward head  -       BMI 44.55  RLE ROM: WFL  RLE Strength: Deficits: 3+/5  LLE ROM: WFL  LLE Strength: WFL    Functional Mobility:  Bed Mobility:     Scooting: modified independence  Supine to Sit: minimum assistance  Sit to Supine: minimum assistance  Transfers:     Sit to Stand:  minimum assistance with rolling walker  Gait: 30ft within room with RW slow kathy, forward trunk posture      AM-PAC 6 CLICK MOBILITY  Total Score:17       Treatment & Education:  Patient was educated on the importance of OOB activity and functional mobility to negate negative effects of prolonged bed rest during hospitalization, safe transfers and ambulation, and D/C planning   Back to bed due to back pain    Patient left HOB elevated with all lines intact, call button in reach, and nurse Limon notified.    GOALS:   Multidisciplinary Problems       Physical Therapy Goals          Problem: Physical Therapy    Goal Priority Disciplines Outcome Interventions   Physical Therapy Goal     PT, PT/OT Progressing    Description: Goals to be met by: 2025     Patient will increase functional independence with mobility by performin. Supine to sit with Modified [place]  2. Sit to stand transfer with Contact Guard Assistance  3. Bed to chair transfer with Contact Guard Assistance using Rolling Walker  4. Gait  x 50 feet with Minimal Assistance using Rolling Walker.   5. Lower extremity exercise program x20 reps                       DME Justifications:  No  DME recommended requiring DME justifications    History:     Past Medical History:   Diagnosis Date    C. difficile colitis     Cavitary pneumonia 11/2023    pos aspergillus serology    Diabetes mellitus 01/2022    Hyperlipidemia 2015    Hypertension 2015    Insomnia 2015    Ulcerative colitis        Past Surgical History:   Procedure Laterality Date    BRONCHOSCOPY WITH FLUOROSCOPY Left 11/20/2023    Procedure: BRONCHOSCOPY, WITH FLUOROSCOPY;  Surgeon: Lisa Villarreal MD;  Location: [facility];  Service: Pulmonary;  Laterality: Left;    BRONCHOSCOPY WITH FLUOROSCOPY N/A 11/30/2023    Procedure: BRONCHOSCOPY, WITH FLUOROSCOPY;  Surgeon: Lisa Villarreal MD;  Location: [facility];  Service: Pulmonary;  Laterality: N/A;    CARDIAC ELECTROPHYSIOLOGY MAPPING AND ABLATION  2017    SVT    CHOLECYSTECTOMY  2011    COLONOSCOPY N/A 5/3/2022    Procedure: COLONOSCOPY;  Surgeon: Shan Jean III, MD;  Location: [facility];  Service: Endoscopy;  Laterality: N/A;    COLONOSCOPY N/A 3/16/2024    Procedure: COLONOSCOPY;  Surgeon: ALEKSANDER Ramos MD;  Location: [facility];  Service: Endoscopy;  Laterality: N/A;    COLONOSCOPY N/A 1/17/2025    Procedure: COLONOSCOPY;  Surgeon: Russ Rowe MD;  Location: [facility];  Service: Endoscopy;  Laterality: N/A;    COLONOSCOPY WITH FECAL MICROBIOTA TRANSFER N/A 3/21/2023    Procedure: COLONOSCOPY, WITH FECAL MICROBIOTA TRANSFER;  Surgeon: David Millan MD;  Location: [facility];  Service: Endoscopy;  Laterality: N/A;    ESOPHAGOGASTRODUODENOSCOPY N/A 4/24/2023    Procedure: EGD (ESOPHAGOGASTRODUODENOSCOPY);  Surgeon: Shan Jean III, MD;  Location: [facility];  Service: Endoscopy;  Laterality: N/A;    ESOPHAGOGASTRODUODENOSCOPY N/A 6/5/2024    Procedure: EGD (ESOPHAGOGASTRODUODENOSCOPY);  Surgeon: Odalis Mccabe MD;  Location: [facility];  Service: Endoscopy;  Laterality: N/A;    FLEXIBLE SIGMOIDOSCOPY N/A 6/5/2024    Procedure: SIGMOIDOSCOPY, FLEXIBLE;   Surgeon: Odalis Mccabe MD;  Location: [facility];  Service: Endoscopy;  Laterality: N/A;    FLEXIBLE SIGMOIDOSCOPY N/A 7/16/2024    Procedure: SIGMOIDOSCOPY, FLEXIBLE;  Surgeon: Shan Jean III, MD;  Location: [facility] ENDO;  Service: Endoscopy;  Laterality: N/A;    FLEXIBLE SIGMOIDOSCOPY N/A 8/13/2024    Procedure: SIGMOIDOSCOPY, FLEXIBLE;  Surgeon: Shan Jean III, MD;  Location: [facility];  Service: Endoscopy;  Laterality: N/A;    GANGLION CYST EXCISION Left 1992    MAGNETIC RESONANCE IMAGING N/A 3/7/2025    Procedure: MRI (MAGNETIC RESONANCE IMAGING);  Surgeon: Provider, I[facility];  Location: [facility];  Service: General;  Laterality: N/A;    UMBILICAL HERNIA REPAIR  2011    with mesh       Time Tracking:     PT Received On: 03/10/25  PT Start Time: 1024     PT Stop Time: 1036  PT Total Time (min): 12 min     Billable Minutes: Evaluation 12      03/10/2025

## 2025-03-10 NOTE — ASSESSMENT & PLAN NOTE
Patient is chronically on statin.will continue for now. Monitor clinically. Last LDL was   Lab Results   Component Value Date    LDLCALC 61.6 (L) 03/05/2025

## 2025-03-10 NOTE — ED NOTES
Pt being transported to [facility] via EMS at this time. Pt has all personal belongings. VSS. No acute distress. Report has been called to [facility] prior to transport.

## 2025-03-10 NOTE — ASSESSMENT & PLAN NOTE
"Patient's FSGs are controlled on current medication regimen.  Last A1c reviewed-   Lab Results   Component Value Date    HGBA1C 5.3 03/05/2025     Most recent fingerstick glucose reviewed- No results for input(s): "POCTGLUCOSE" in the last 24 hours.  Current correctional scale  Medium  Maintain anti-hyperglycemic dose as follows-   Antihyperglycemics (From admission, onward)      Start     Stop Route Frequency Ordered    03/09/25 2325  insulin aspart U-100 pen 0-10 Units         -- SubQ Before meals & nightly PRN 03/09/25 2226          Hold Oral hypoglycemics while patient is in the hospital.      "

## 2025-03-10 NOTE — SUBJECTIVE & OBJECTIVE
Past Medical History:   Diagnosis Date    C. difficile colitis     Cavitary pneumonia 11/2023    pos aspergillus serology    Diabetes mellitus 01/2022    Hyperlipidemia 2015    Hypertension 2015    Insomnia 2015    Ulcerative colitis        Past Surgical History:   Procedure Laterality Date    BRONCHOSCOPY WITH FLUOROSCOPY Left 11/20/2023    Procedure: BRONCHOSCOPY, WITH FLUOROSCOPY;  Surgeon: Lisa Villarreal MD;  Location: [facility];  Service: Pulmonary;  Laterality: Left;    BRONCHOSCOPY WITH FLUOROSCOPY N/A 11/30/2023    Procedure: BRONCHOSCOPY, WITH FLUOROSCOPY;  Surgeon: Lisa Villarreal MD;  Location: [facility];  Service: Pulmonary;  Laterality: N/A;    CARDIAC ELECTROPHYSIOLOGY MAPPING AND ABLATION  2017    SVT    CHOLECYSTECTOMY  2011    COLONOSCOPY N/A 5/3/2022    Procedure: COLONOSCOPY;  Surgeon: Shan Jean III, MD;  Location: [facility];  Service: Endoscopy;  Laterality: N/A;    COLONOSCOPY N/A 3/16/2024    Procedure: COLONOSCOPY;  Surgeon: ALEKSANDER Ramos MD;  Location: [facility];  Service: Endoscopy;  Laterality: N/A;    COLONOSCOPY N/A 1/17/2025    Procedure: COLONOSCOPY;  Surgeon: Russ Rowe MD;  Location: [facility];  Service: Endoscopy;  Laterality: N/A;    COLONOSCOPY WITH FECAL MICROBIOTA TRANSFER N/A 3/21/2023    Procedure: COLONOSCOPY, WITH FECAL MICROBIOTA TRANSFER;  Surgeon: David Millan MD;  Location: [facility];  Service: Endoscopy;  Laterality: N/A;    ESOPHAGOGASTRODUODENOSCOPY N/A 4/24/2023    Procedure: EGD (ESOPHAGOGASTRODUODENOSCOPY);  Surgeon: Shan Jean III, MD;  Location: [facility];  Service: Endoscopy;  Laterality: N/A;    ESOPHAGOGASTRODUODENOSCOPY N/A 6/5/2024    Procedure: EGD (ESOPHAGOGASTRODUODENOSCOPY);  Surgeon: Odalis Mccabe MD;  Location: [facility];  Service: Endoscopy;  Laterality: N/A;    FLEXIBLE SIGMOIDOSCOPY N/A 6/5/2024    Procedure: SIGMOIDOSCOPY, FLEXIBLE;  Surgeon: Odalis Mccabe MD;  Location: [facility];  Service:  Endoscopy;  Laterality: N/A;    FLEXIBLE SIGMOIDOSCOPY N/A 7/16/2024    Procedure: SIGMOIDOSCOPY, FLEXIBLE;  Surgeon: Shan Jean III, MD;  Location: [facility];  Service: Endoscopy;  Laterality: N/A;    FLEXIBLE SIGMOIDOSCOPY N/A 8/13/2024    Procedure: SIGMOIDOSCOPY, FLEXIBLE;  Surgeon: Shan Jean III, MD;  Location: [facility];  Service: Endoscopy;  Laterality: N/A;    GANGLION CYST EXCISION Left 1992    UMBILICAL HERNIA REPAIR  2011    with mesh       Review of patient's allergies indicates:  No Known Allergies    Current Facility-Administered Medications on File Prior to Encounter   Medication    lactated ringers infusion     Current Outpatient Medications on File Prior to Encounter   Medication Sig    busPIRone (BUSPAR) 15 MG tablet Take 1 tablet (15 mg total) by mouth 3 (three) times daily.    diazePAM (VALIUM) 5 MG tablet Take 1 tablet (5 mg total) by mouth daily as needed for Anxiety.    ergocalciferol (ERGOCALCIFEROL) 50,000 unit Cap Take 1 capsule by mouth every Sunday.    EScitalopram oxalate (LEXAPRO) 20 MG tablet Take 1 tablet (20 mg total) by mouth once daily.    ketorolac (TORADOL) 10 mg tablet Take 1 tablet (10 mg total) by mouth every 6 (six) hours as needed for Pain.    methocarbamoL (ROBAXIN) 750 MG Tab Take 1 tablet (750 mg total) by mouth 4 (four) times daily as needed (muscle spasm).    metoprolol succinate (TOPROL-XL) 50 MG 24 hr tablet Take 1 tablet (50 mg total) by mouth once daily.    oxyCODONE (ROXICODONE) 10 mg Tab immediate release tablet Take 1 tablet (10 mg total) by mouth every 6 (six) hours as needed for Pain.    pantoprazole (PROTONIX) 40 MG tablet Take 40 mg by mouth 2 (two) times daily.    predniSONE (DELTASONE) 20 MG tablet Take 3 tablets (60 mg total) by mouth once daily. for 4 days    pregabalin (LYRICA) 50 MG capsule Take 1 capsule by mouth 2 (two) times daily.    promethazine (PHENERGAN) 25 MG tablet Take 25 mg by mouth 4 (four) times daily as needed for  Nausea.    simvastatin (ZOCOR) 20 MG tablet Take 1 tablet (20 mg total) by mouth every evening.    tofacitinib (XELJANZ) 10 mg Tab Take 10 mg by mouth 2 (two) times daily.    zolpidem (AMBIEN) 10 mg Tab Take 1 tablet (10 mg total) by mouth nightly as needed (insomnia).    [DISCONTINUED] budesonide (ENTOCORT EC) 3 mg capsule Take 3 capsules (9 mg total) by mouth once daily. (Patient not taking: Reported on 3/5/2025)    [DISCONTINUED] Lactobac 2-Bifido 1-S. therm (VSL#3) 112.5 billion cell Cap Take 1 capsule by mouth twice a day (Patient not taking: Reported on 3/5/2025)     Family History       Problem Relation (Age of Onset)    Asthma Mother          Tobacco Use    Smoking status: Former     Average packs/day: 1 pack/day for 30.0 years (30.0 ttl pk-yrs)     Types: Cigarettes     Start date: 1993    Smokeless tobacco: Never    Tobacco comments:     Pt states he has stopped smoking with Chantix three weeks ago. 12/1/23   Substance and Sexual Activity    Alcohol use: Yes     Comment: ocass    Drug use: Not Currently    Sexual activity: Not Currently     Review of Systems   Constitutional:  Negative for chills and fever.   HENT:  Negative for congestion and sore throat.    Eyes:  Negative for visual disturbance.   Respiratory:  Negative for cough and shortness of breath.    Cardiovascular:  Negative for chest pain and palpitations.   Gastrointestinal:  Negative for abdominal pain, constipation, diarrhea, nausea and vomiting.   Endocrine: Negative for cold intolerance and heat intolerance.   Genitourinary:  Negative for dysuria and hematuria.   Musculoskeletal:  Positive for back pain. Negative for arthralgias and myalgias.   Skin:  Negative for rash.   Neurological:  Negative for tremors and seizures.   Hematological:  Negative for adenopathy. Does not bruise/bleed easily.   All other systems reviewed and are negative.    Objective:     Vital Signs (Most Recent):  Temp: 97.7 °F (36.5 °C) (03/09/25 2339)  Pulse: 66  (03/09/25 2339)  Resp: 18 (03/09/25 2339)  BP: (!) 156/72 (03/09/25 2339)  SpO2: 98 % (03/09/25 2339) Vital Signs (24h Range):  Temp:  [97.4 °F (36.3 °C)-97.7 °F (36.5 °C)] 97.7 °F (36.5 °C)  Pulse:  [59-76] 66  Resp:  [17-28] 18  SpO2:  [96 %-99 %] 98 %  BP: (124-160)/(58-72) 156/72     Weight: 132.9 kg (293 lb)  Body mass index is 44.55 kg/m².     Physical Exam  Vitals and nursing note reviewed.   Constitutional:       Appearance: Normal appearance. He is well-developed.   HENT:      Head: Normocephalic and atraumatic.      Nose: Nose normal. No septal deviation.   Eyes:      Conjunctiva/sclera: Conjunctivae normal.      Pupils: Pupils are equal, round, and reactive to light.   Neck:      Thyroid: No thyroid mass.      Vascular: No JVD.      Trachea: No tracheal tenderness or tracheal deviation.   Cardiovascular:      Rate and Rhythm: Normal rate and regular rhythm.      Heart sounds: S1 normal and S2 normal. No murmur heard.     No friction rub. No gallop.   Pulmonary:      Effort: Pulmonary effort is normal.      Breath sounds: Normal breath sounds. No decreased breath sounds, wheezing, rhonchi or rales.   Abdominal:      General: Bowel sounds are normal. There is no distension.      Palpations: Abdomen is soft. There is no hepatomegaly, splenomegaly or mass.      Tenderness: There is no abdominal tenderness.   Musculoskeletal:      Thoracic back: Spasms and tenderness present. Decreased range of motion.   Skin:     General: Skin is warm.      Findings: No rash.   Neurological:      General: No focal deficit present.      Mental Status: He is alert and oriented to person, place, and time. Mental status is at baseline.      GCS: GCS eye subscore is 4. GCS verbal subscore is 5. GCS motor subscore is 6.      Cranial Nerves: No cranial nerve deficit.      Sensory: Sensation is intact. No sensory deficit.      Motor: Motor function is intact.   Psychiatric:         Mood and Affect: Mood normal.         Behavior:  Behavior normal.              CRANIAL NERVES     CN III, IV, VI   Pupils are equal, round, and reactive to light.       Significant Labs: All pertinent labs within the past 24 hours have been reviewed.  CBC:   Recent Labs   Lab 03/09/25  2220   WBC 8.66   HGB 9.3*   HCT 30.6*        CMP:   Recent Labs   Lab 03/09/25  2220      K 4.5      CO2 30*   *   BUN 17   CREATININE 1.0   CALCIUM 9.1   PROT 6.8   ALBUMIN 3.6   BILITOT 0.4   ALKPHOS 66   AST 11   ALT 12   ANIONGAP 8       Significant Imaging:   Imaging Results    None

## 2025-03-10 NOTE — ASSESSMENT & PLAN NOTE
Anemia is likely due to chronic blood loss and chronic disease. Most recent hemoglobin and hematocrit are listed below.  Recent Labs     03/07/25  0458   HGB 8.1*   HCT 26.2*     Plan  - Monitor serial CBC: Daily  - Transfuse PRBC if patient becomes hemodynamically unstable, symptomatic or H/H drops below 7/21.  - Patient has not received any PRBC transfusions to date  - Patient's anemia is currently stable  -

## 2025-03-10 NOTE — HPI
Devin Jean-Baptiste is a 50 year old male with a past medical history of ulcerative colitis, C diff s/p fecal transplant, HTN, HLD, DM, cavitary pneumonia, anemia, and chronic low back pain who presented to the ED with low back pain. He has known thoracic vertebral fractures that apparently are getting worse. He was recently admitted for acute neurological deficit and discharged on 3/7, after symptom (leg numbness and tingling), found to be related to low back pain. He states he has been trying his oxycodone at home but it is not helping the pain. He denies numbness or tingling in his lower extremities, saddle anesthesia, bowel or bladder incontinence. He states he has been falling at home due to the pain. He states that he wanted to be evaluated for SNF placement due to his inability to care for himself and due to his living conditions at his previous admit, but he reports this was not addressed. He denies other complaint. ED work up included a CBC, which showed chronic stable anemia. CMP unremarkable. He was given several doses of Dilaudid in the ED, which did not improve his pain. The ED attempted to ambulate him, but he required assistance and was unable to walk due to the pain. Attempts were made to secure discharge to home with a wheelchair or walker, but due to living in a trailer he is unable to use these. Hospital Medicine consulted for admission and further management.

## 2025-03-10 NOTE — TELEPHONE ENCOUNTER
----- Message from  Johnna sent at 3/7/2025 11:01 AM CST -----  Regarding: HOSPITAL FOLLOW UP  Discharging from [facility] today.  Admitted for acute neurological deficit. Patient needs appointment within 7 days.  Please call patient to schedule appointment.
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
## 2025-03-10 NOTE — PLAN OF CARE
Problem: Physical Therapy  Goal: Physical Therapy Goal  Description: Goals to be met by: 2025     Patient will increase functional independence with mobility by performin. Supine to sit with Modified [place]  2. Sit to stand transfer with Contact Guard Assistance  3. Bed to chair transfer with Contact Guard Assistance using Rolling Walker  4. Gait  x 50 feet with Minimal Assistance using Rolling Walker.   5. Lower extremity exercise program x20 reps  Outcome: Progressing   PT eval and treat. Pt ambulated  with RW 30ft within room. C/o back  pain 8/10

## 2025-03-10 NOTE — ASSESSMENT & PLAN NOTE
Admit to obs  Was discharged on 3/7 after admit for acute neurological deficit   Findings included worsening of his chronic thoracic vertebral fractures  He states that he was hoping for SNF placement as he is unable to care for himself due to his living conditions and chronic pain  Returned today for exacerbation of chronic pain, unable to be controlled with several doses of IV pain medication    Consult case management to discuss placement in a facility or home care  Pain meds IV along with his regular prescribed pain meds

## 2025-03-10 NOTE — PLAN OF CARE
Pt accepted at [facility]. Auth to be submitted this afternoon VS early in AM depending on therapy notes       03/10/25 1553   Post-Acute Status   Post-Acute Authorization Placement   Post-Acute Placement Status Pending payor review/awaiting authorization (if required)

## 2025-03-10 NOTE — PT/OT/SLP EVAL
Occupational Therapy   Evaluation    Name: Jacoby Jean-Baptiste  MRN: [id number]  Admitting Diagnosis: Intractable pain  Recent Surgery: * No surgery found *      Recommendations:     Discharge Recommendations: High Intensity Therapy  Discharge Equipment Recommendations:  other (see comments) (to be determined by next level of care)  Barriers to discharge:  Inaccessible home environment, Decreased caregiver support    Assessment:     Jacoby Jean-Baptiste is a 50 y.o. male with a medical diagnosis of Intractable pain.  Performance deficits affecting function: weakness, impaired endurance, impaired self care skills, impaired functional mobility, gait instability, pain.      Patient has T12 and L1 fx, TLSO but not wearing because he states it is too small, performed bed mobility, sit<>stand t/f, ambulated to sink for grooming and back to bed using RW and [place]. Patient reports multiple falls at home recently.    Rehab Prognosis: Good; patient would benefit from acute skilled OT services to address these deficits and reach maximum level of function.       Plan:     Patient to be seen 6 x/week to address the above listed problems via self-care/home management, therapeutic activities, therapeutic exercises  Plan of Care Expires:    Plan of Care Reviewed with: patient    Subjective     Chief Complaint: back pain  Patient/Family Comments/goals: to be out of pain and to heal    Occupational Profile:  Living Environment: patient lives alone in a trailer with three steps to enter.   Previous level of function: ambulatory short distances, modified independent with all ADL's  Roles and Routines: still drives but not working currently  Equipment Used at Home:  patient does not own any DME  Assistance upon Discharge: patient will have assistance from facility staff upon discharge    Pain/Comfort:  Pain Rating 1: 9/10  Location 1: back  Pain Addressed 1: Reposition, Distraction  Pain Rating Post-Intervention 1: 9/10    Patients cultural,  spiritual, Church conflicts given the current situation:      Objective:     Communicated with: nurse prior to session.  Patient found HOB elevated with peripheral IV upon OT entry to room.    General Precautions: Standard, fall, special contact, diabetic  Orthopedic Precautions:    Braces: TLSO (but not wearing as patient states it is too small for him)  Respiratory Status: Room air    Occupational Performance:    Bed Mobility:    Patient completed Supine to Sit with minimum assistance  Patient completed Sit to Supine with minimum assistance    Functional Mobility/Transfers:  Patient completed Sit <> Stand Transfer with minimum assistance  with  rolling walker       Activities of Daily Living:  Grooming: contact guard assistance for washing hands while standing at sink, using RW.     Cognitive/Visual Perceptual:  Cognitive/Psychosocial Skills:     -       Oriented to: Person, Place, Time, and Situation   -       Follows Commands/attention:Follows one-step commands  -       Communication: clear/fluent    Physical Exam:  Upper Extremity Range of Motion:     -       Right Upper Extremity: WFL  -       Left Upper Extremity: WFL  Upper Extremity Strength:    -       Right Upper Extremity: limited by back pain 3/5  -       Left Upper Extremity: limited by back pain 3/5   Strength:    -       Right Upper Extremity: WFL  -       Left Upper Extremity: WFL  Fine Motor Coordination:    -       Intact    AMPAC 6 Click ADL:  AMPAC Total Score: 22    Treatment & Education:  Therapist provided facilitation and instruction of proper body mechanics and fall prevention strategies during tasks listed above.   Instructed patient to sit in bedside chair as tolerated daily to increase OOB/activity tolerance.   Instructed patient to use call light to have nursing staff assist with needs/transfers.   Discussed OT POC and answered all questions within OT scope of practice.        Patient left HOB elevated with all lines intact, call  button in reach, and bed alarm on    GOALS:   Goals to be met by: 4/10/2025     Patient will increase functional independence with ADLs by performing:    UE Dressing with Set-up Assistance.  LE Dressing with Modified [place] and Assistive Devices as needed.  Grooming while standing at sink with Modified [place].  Toileting from toilet with Modified [place] and Assistive Devices as needed for hygiene and clothing management.   Toilet transfer to toilet with Contact Guard Assistance.        History:     Past Medical History:   Diagnosis Date    C. difficile colitis     Cavitary pneumonia 11/2023    pos aspergillus serology    Diabetes mellitus 01/2022    Hyperlipidemia 2015    Hypertension 2015    Insomnia 2015    Ulcerative colitis          Past Surgical History:   Procedure Laterality Date    BRONCHOSCOPY WITH FLUOROSCOPY Left 11/20/2023    Procedure: BRONCHOSCOPY, WITH FLUOROSCOPY;  Surgeon: Lisa Villarreal MD;  Location: [facility];  Service: Pulmonary;  Laterality: Left;    BRONCHOSCOPY WITH FLUOROSCOPY N/A 11/30/2023    Procedure: BRONCHOSCOPY, WITH FLUOROSCOPY;  Surgeon: Lisa Villarreal MD;  Location: [facility];  Service: Pulmonary;  Laterality: N/A;    CARDIAC ELECTROPHYSIOLOGY MAPPING AND ABLATION  2017    SVT    CHOLECYSTECTOMY  2011    COLONOSCOPY N/A 5/3/2022    Procedure: COLONOSCOPY;  Surgeon: Shan Jean III, MD;  Location: [facility];  Service: Endoscopy;  Laterality: N/A;    COLONOSCOPY N/A 3/16/2024    Procedure: COLONOSCOPY;  Surgeon: ALEKSANDER Ramos MD;  Location: [facility];  Service: Endoscopy;  Laterality: N/A;    COLONOSCOPY N/A 1/17/2025    Procedure: COLONOSCOPY;  Surgeon: Russ Rowe MD;  Location: [facility];  Service: Endoscopy;  Laterality: N/A;    COLONOSCOPY WITH FECAL MICROBIOTA TRANSFER N/A 3/21/2023    Procedure: COLONOSCOPY, WITH FECAL MICROBIOTA TRANSFER;  Surgeon: David Millan MD;  Location: [facility];  Service: Endoscopy;  Laterality:  N/A;    ESOPHAGOGASTRODUODENOSCOPY N/A 4/24/2023    Procedure: EGD (ESOPHAGOGASTRODUODENOSCOPY);  Surgeon: Shan Jean III, MD;  Location: [facility];  Service: Endoscopy;  Laterality: N/A;    ESOPHAGOGASTRODUODENOSCOPY N/A 6/5/2024    Procedure: EGD (ESOPHAGOGASTRODUODENOSCOPY);  Surgeon: Odalis Mccabe MD;  Location: [facility];  Service: Endoscopy;  Laterality: N/A;    FLEXIBLE SIGMOIDOSCOPY N/A 6/5/2024    Procedure: SIGMOIDOSCOPY, FLEXIBLE;  Surgeon: Odalis Mccabe MD;  Location: [facility];  Service: Endoscopy;  Laterality: N/A;    FLEXIBLE SIGMOIDOSCOPY N/A 7/16/2024    Procedure: SIGMOIDOSCOPY, FLEXIBLE;  Surgeon: Shan Jean III, MD;  Location: [facility];  Service: Endoscopy;  Laterality: N/A;    FLEXIBLE SIGMOIDOSCOPY N/A 8/13/2024    Procedure: SIGMOIDOSCOPY, FLEXIBLE;  Surgeon: Shan Jean III, MD;  Location: [facility];  Service: Endoscopy;  Laterality: N/A;    GANGLION CYST EXCISION Left 1992    MAGNETIC RESONANCE IMAGING N/A 3/7/2025    Procedure: MRI (MAGNETIC RESONANCE IMAGING);  Surgeon: Nish, I[facility] Diagnostic;  Location: [facility];  Service: General;  Laterality: N/A;    UMBILICAL HERNIA REPAIR  2011    with mesh       Time Tracking:     OT Date of Treatment: 03/10/25  OT Start Time: 0938  OT Stop Time: 0953  OT Total Time (min): 15 min    Billable Minutes:Evaluation 15    3/10/2025

## 2025-03-10 NOTE — PLAN OF CARE
POC reviewed Verbalizes understanding at this time VSS afebrile pain and nausea managed with PRN medications Blood glucose monitored and managed , AAOX4 up to toilet with stand by assistance no acute distress noted

## 2025-03-10 NOTE — ED PROVIDER NOTES
Encounter Date: 3/9/2025       History     Chief Complaint   Patient presents with    Back Pain     X 1.5 weeks rad down left leg    Constipation     X 4 days     50-year-old male past medical history of type 2 diabetes, hyperlipidemia, hypertension, ulcerative colitis presents to the emergency department for low back pain with a known vertebral fracture.  Patient came to the emergency department on 02/28 for an ulcerative colitis flare up but got into an accident on the way to the emergency room and began to feel back pain.  It was not until 3/5 when patient came to the emergency department for leg numbness and tingling and was admitted for acute focal neurologic deficit did they find a T12 and L1 vertebral fracture.  This did not require surgery.  Patient did need to be sedated for MRI.  He is presenting today for continuing pain.  Reports that he has tried oxycodone at home and it is not helping.  He denies numbness or tingling in his lower extremities, saddle anesthesia, bowel or bladder incontinence, fevers.  Reports that he and was fell 4 times a day at home due to the pain in his back not necessarily weakness.        Review of patient's allergies indicates:  No Known Allergies  Past Medical History:   Diagnosis Date    C. difficile colitis     Cavitary pneumonia 11/2023    pos aspergillus serology    Diabetes mellitus 01/2022    Hyperlipidemia 2015    Hypertension 2015    Insomnia 2015    Ulcerative colitis      Past Surgical History:   Procedure Laterality Date    BRONCHOSCOPY WITH FLUOROSCOPY Left 11/20/2023    Procedure: BRONCHOSCOPY, WITH FLUOROSCOPY;  Surgeon: Lisa Villarreal MD;  Location: [facility] ENDO;  Service: Pulmonary;  Laterality: Left;    BRONCHOSCOPY WITH FLUOROSCOPY N/A 11/30/2023    Procedure: BRONCHOSCOPY, WITH FLUOROSCOPY;  Surgeon: Lisa Villarreal MD;  Location: [facility] ENDO;  Service: Pulmonary;  Laterality: N/A;    CARDIAC ELECTROPHYSIOLOGY MAPPING AND ABLATION  2017    SVT     CHOLECYSTECTOMY  2011    COLONOSCOPY N/A 5/3/2022    Procedure: COLONOSCOPY;  Surgeon: Shan Jean III, MD;  Location: [facility];  Service: Endoscopy;  Laterality: N/A;    COLONOSCOPY N/A 3/16/2024    Procedure: COLONOSCOPY;  Surgeon: ALEKSANDER Ramos MD;  Location: [facility];  Service: Endoscopy;  Laterality: N/A;    COLONOSCOPY N/A 1/17/2025    Procedure: COLONOSCOPY;  Surgeon: Russ Rowe MD;  Location: [facility];  Service: Endoscopy;  Laterality: N/A;    COLONOSCOPY WITH FECAL MICROBIOTA TRANSFER N/A 3/21/2023    Procedure: COLONOSCOPY, WITH FECAL MICROBIOTA TRANSFER;  Surgeon: David Millan MD;  Location: [facility];  Service: Endoscopy;  Laterality: N/A;    ESOPHAGOGASTRODUODENOSCOPY N/A 4/24/2023    Procedure: EGD (ESOPHAGOGASTRODUODENOSCOPY);  Surgeon: Shan Jean III, MD;  Location: [facility];  Service: Endoscopy;  Laterality: N/A;    ESOPHAGOGASTRODUODENOSCOPY N/A 6/5/2024    Procedure: EGD (ESOPHAGOGASTRODUODENOSCOPY);  Surgeon: Odalis Mccabe MD;  Location: [facility];  Service: Endoscopy;  Laterality: N/A;    FLEXIBLE SIGMOIDOSCOPY N/A 6/5/2024    Procedure: SIGMOIDOSCOPY, FLEXIBLE;  Surgeon: Odalis Mccabe MD;  Location: [facility];  Service: Endoscopy;  Laterality: N/A;    FLEXIBLE SIGMOIDOSCOPY N/A 7/16/2024    Procedure: SIGMOIDOSCOPY, FLEXIBLE;  Surgeon: Shan Jean III, MD;  Location: [facility];  Service: Endoscopy;  Laterality: N/A;    FLEXIBLE SIGMOIDOSCOPY N/A 8/13/2024    Procedure: SIGMOIDOSCOPY, FLEXIBLE;  Surgeon: Shan Jean III, MD;  Location: [facility];  Service: Endoscopy;  Laterality: N/A;    GANGLION CYST EXCISION Left 1992    UMBILICAL HERNIA REPAIR  2011    with mesh     Family History   Problem Relation Name Age of Onset    Asthma Mother       Social History[1]  Review of Systems   Constitutional: Negative.    Respiratory: Negative.     Cardiovascular: Negative.    Gastrointestinal: Negative.    Genitourinary: Negative.     Musculoskeletal:  Positive for back pain.   Neurological: Negative.        Physical Exam     Initial Vitals [03/09/25 1406]   BP Pulse Resp Temp SpO2   132/63 72 17 97.4 °F (36.3 °C) 96 %      MAP       --         Physical Exam    Vitals reviewed.  Constitutional: He appears well-developed and well-nourished. He is not diaphoretic. No distress.   HENT: Mouth/Throat: Oropharynx is clear and moist.   Eyes: Conjunctivae and EOM are normal. Pupils are equal, round, and reactive to light. Right eye exhibits no discharge. Left eye exhibits no discharge.   Neck: Neck supple.   Normal range of motion.  Cardiovascular:  Normal rate, regular rhythm, normal heart sounds and intact distal pulses.           Pulmonary/Chest: Breath sounds normal. No respiratory distress.   Abdominal: Abdomen is soft. He exhibits no distension.   Musculoskeletal:         General: Tenderness (Over lumbar spine) present.      Cervical back: Normal range of motion and neck supple.      Comments: Significant tenderness over the lumbar spine without bony abnormality, erythema, ecchymosis noted.     Neurological: He is alert and oriented to person, place, and time. He has normal strength. GCS score is 15. GCS eye subscore is 4. GCS verbal subscore is 5. GCS motor subscore is 6.   No cranial nerve deficit.  Sensation intact to light and deep in all 4 extremities.  5/5 strength.  Antalgic gait   Skin: Skin is warm. Capillary refill takes less than 2 seconds.         ED Course   Procedures  Labs Reviewed   CBC W/ AUTO DIFFERENTIAL - Abnormal       Result Value    WBC 8.66      RBC 3.34 (*)     Hemoglobin 9.3 (*)     Hematocrit 30.6 (*)     MCV 92      MCH 27.8      MCHC 30.4 (*)     RDW 20.6 (*)     Platelets 264      MPV 11.2      Immature Granulocytes 0.3      Gran # (ANC) 7.9 (*)     Immature Grans (Abs) 0.03      Lymph # 0.4 (*)     Mono # 0.4      Eos # 0.0      Baso # 0.01      nRBC 0      Gran % 91.1 (*)     Lymph % 4.5 (*)     Mono % 4.0       Eosinophil % 0.0      Basophil % 0.1      Differential Method Automated     COMPREHENSIVE METABOLIC PANEL - Abnormal    Sodium 140      Potassium 4.5      Chloride 102      CO2 30 (*)     Glucose 140 (*)     BUN 17      Creatinine 1.0      Calcium 9.1      Total Protein 6.8      Albumin 3.6      Total Bilirubin 0.4      Alkaline Phosphatase 66      AST 11      ALT 12      eGFR >60.0      Anion Gap 8     URINALYSIS, REFLEX TO URINE CULTURE          Imaging Results    None          Medications   busPIRone tablet 15 mg (has no administration in time range)   diazePAM tablet 5 mg (has no administration in time range)   EScitalopram oxalate tablet 20 mg (has no administration in time range)   ketorolac tablet 10 mg (has no administration in time range)   methocarbamoL tablet 750 mg (has no administration in time range)   metoprolol succinate (TOPROL-XL) 24 hr tablet 50 mg (has no administration in time range)   oxyCODONE immediate release tablet 10 mg (has no administration in time range)   pantoprazole EC tablet 40 mg (has no administration in time range)   pregabalin capsule 50 mg (has no administration in time range)   predniSONE tablet 60 mg (has no administration in time range)   atorvastatin tablet 10 mg (has no administration in time range)   zolpidem tablet 10 mg (has no administration in time range)   sodium chloride 0.9% flush 3 mL (has no administration in time range)   melatonin tablet 9 mg (has no administration in time range)   ondansetron injection 8 mg (has no administration in time range)   polyethylene glycol packet 17 g (has no administration in time range)   acetaminophen tablet 650 mg (has no administration in time range)   simethicone chewable tablet 80 mg (has no administration in time range)   aluminum-magnesium hydroxide-simethicone 200-200-20 mg/5 mL suspension 30 mL (has no administration in time range)   acetaminophen tablet 650 mg (has no administration in time range)   naloxone 0.4 mg/mL  injection 0.02 mg (has no administration in time range)   potassium bicarbonate disintegrating tablet 50 mEq (has no administration in time range)   potassium bicarbonate disintegrating tablet 35 mEq (has no administration in time range)   potassium bicarbonate disintegrating tablet 60 mEq (has no administration in time range)   magnesium oxide tablet 800 mg (has no administration in time range)   magnesium oxide tablet 800 mg (has no administration in time range)   potassium, sodium phosphates 280-160-250 mg packet 2 packet (has no administration in time range)   potassium, sodium phosphates 280-160-250 mg packet 2 packet (has no administration in time range)   potassium, sodium phosphates 280-160-250 mg packet 2 packet (has no administration in time range)   glucose chewable tablet 16 g (has no administration in time range)   glucose chewable tablet 24 g (has no administration in time range)   glucagon (human recombinant) injection 1 mg (has no administration in time range)   HYDROmorphone injection 1 mg (has no administration in time range)   insulin aspart U-100 pen 0-10 Units (has no administration in time range)   enoxaparin injection 40 mg (40 mg Subcutaneous Given 3/9/25 2253)   dextrose 10% bolus 125 mL 125 mL (has no administration in time range)   dextrose 10% bolus 250 mL 250 mL (has no administration in time range)   HYDROmorphone injection 1 mg (1 mg Intravenous Given 3/9/25 1702)   HYDROmorphone injection 0.5 mg (0.5 mg Intravenous Given 3/9/25 1919)     Medical Decision Making  50-year-old male presents to the emergency department for intractable pain    Considerations include but not limited to vertebral fracture, cauda equina, herniated disc, intractable pain secondary to fracture    Vitals stable.  Patient afebrile.  Well-appearing on physical exam.  Nontoxic and in no acute distress.  It is very difficult for patient to get out of the wheelchair and move into the bed.  Took approximately 6  minutes to position the patient.  He is neurovascularly intact.  5/5 strength.  Sensation intact to light and deep in all extremities.  No cranial nerve deficits.  Patient given a dose of Dilaudid here and reports no change in his pain.  Patient given a 2nd dose of Dilaudid and reports only a mild decrease in his pain.  I attempted to had the patient get up in ambulate after his pain medication.  I did need the assistance of a nurse in order to get the patient out of the bed.  He is still unable to walk around with a steady gait.  Due to patient's level of pain and physical exam he will be admitted for intractable pain.  I did consider sending home with a wheelchair or walker but given the patient's living situation in the trailer he can not use either one of those and this could potentially cause a 2nd fall resulting in worsening injury.  We also discussed potential skilled nursing facilities on discharge.  A informed him that this will have to be determined by hospital medicine.  He verbalizes understanding and agreed to stay.  Plan also discussed with my attending and all questions were answered at the bedside.    Amount and/or Complexity of Data Reviewed  Labs: ordered.    Risk  Prescription drug management.  Decision regarding hospitalization.                                      Clinical Impression:  Final diagnoses:  [R52] Intractable pain (Primary)          ED Disposition Condition    Admit Stable                  [1]   Social History  Tobacco Use    Smoking status: Former     Average packs/day: 1 pack/day for 30.0 years (30.0 ttl pk-yrs)     Types: Cigarettes     Start date: 1993    Smokeless tobacco: Never    Tobacco comments:     Pt states he has stopped smoking with Chantix three weeks ago. 12/1/23   Substance Use Topics    Alcohol use: Yes     Comment: ocass    Drug use: Not Currently        Becky Sharma PA-C  03/09/25 [id number]

## 2025-03-10 NOTE — PROGRESS NOTES
Evaluated patient. Reports 6/10 back pain.  Awaiting PT/OT evaluation- recommended for rehab last week during hospitalization. He states he lives alone and can not take care of himself.     Labs reviewed. Unremarkable.     Will need referral to pain management outpatient.   Possible SNF/rehab placement

## 2025-03-10 NOTE — ASSESSMENT & PLAN NOTE
Patient's blood pressure range in the last 24 hours was: BP  Min: 124/58  Max: 160/72.The patient's inpatient anti-hypertensive regimen is listed below:  Current Antihypertensives  metoprolol succinate (TOPROL-XL) 24 hr tablet 50 mg, Daily, Oral    Plan  - BP is controlled, no changes needed to their regimen  -

## 2025-03-10 NOTE — PROGRESS NOTES
Pharmacist Renal Dose Adjustment Note    Jacoby Jean-Baptiste is a 50 y.o. male being treated with the medication Enoxaparin    Patient Data:    Vital Signs (Most Recent):  Temp: 97.4 °F (36.3 °C) (03/09/25 1406)  Pulse: 76 (03/09/25 2056)  Resp: (!) 28 (03/09/25 2056)  BP: (!) 124/58 (03/09/25 2056)  SpO2: 98 % (03/09/25 1926) Vital Signs (72h Range):  Temp:  [97.4 °F (36.3 °C)]   Pulse:  [59-76]   Resp:  [17-28]   BP: (124-160)/(58-72)   SpO2:  [96 %-99 %]      Recent Labs   Lab 03/05/25  1408 03/06/25  0706 03/07/25  0458   CREATININE 1.0 0.9 0.9     Serum creatinine: 0.9 mg/dL 03/07/25 0458  Estimated creatinine clearance: 129.4 mL/min    Medication:Enoxaparin dose: 40 mg frequency Daily will be changed to medication:Enoxaparin dose:40 mg frequency:Q12H    Pharmacist's Name: Irma Garcia  Pharmacist's Extension: [phone]

## 2025-03-10 NOTE — ASSESSMENT & PLAN NOTE
Body mass index is 44.55 kg/m². Morbid obesity complicates all aspects of disease management from diagnostic modalities to treatment. Weight loss encouraged and health benefits explained to patient.

## 2025-03-10 NOTE — H&P
[facility] Medicine  History & Physical    Patient Name: Jacoby Jean-Baptiste  MRN: [id number]  Patient Class: IP- Inpatient  Admission Date: 3/9/2025  Attending Physician: Ang Phoenix, *   Primary Care Provider: Hunter Aguilar III, MD         Patient information was obtained from patient, past medical records, and ER records.     Subjective:     Principal Problem:Intractable pain    Chief Complaint:   Chief Complaint   Patient presents with    Back Pain     X 1.5 weeks rad down left leg    Constipation     X 4 days        HPI:   Devin Jean-Baptiste is a 50 year old male with a past medical history of ulcerative colitis, C diff s/p fecal transplant, HTN, HLD, DM, cavitary pneumonia, anemia, and chronic low back pain who presented to the ED with low back pain. He has known thoracic vertebral fractures that apparently are getting worse. He was recently admitted for acute neurological deficit and discharged on 3/7, after symptom (leg numbness and tingling), found to be related to low back pain. He states he has been trying his oxycodone at home but it is not helping the pain. He denies numbness or tingling in his lower extremities, saddle anesthesia, bowel or bladder incontinence. He states he has been falling at home due to the pain. He states that he wanted to be evaluated for SNF placement due to his inability to care for himself and due to his living conditions at his previous admit, but he reports this was not addressed. He denies other complaint. ED work up included a CBC, which showed chronic stable anemia. CMP unremarkable. He was given several doses of Dilaudid in the ED, which did not improve his pain. The ED attempted to ambulate him, but he required assistance and was unable to walk due to the pain. Attempts were made to secure discharge to home with a wheelchair or walker, but due to living in a trailer he is unable to use these. Hospital Medicine consulted for admission  and further management.    Past Medical History:   Diagnosis Date    C. difficile colitis     Cavitary pneumonia 11/2023    pos aspergillus serology    Diabetes mellitus 01/2022    Hyperlipidemia 2015    Hypertension 2015    Insomnia 2015    Ulcerative colitis        Past Surgical History:   Procedure Laterality Date    BRONCHOSCOPY WITH FLUOROSCOPY Left 11/20/2023    Procedure: BRONCHOSCOPY, WITH FLUOROSCOPY;  Surgeon: Lisa Villarreal MD;  Location: [facility];  Service: Pulmonary;  Laterality: Left;    BRONCHOSCOPY WITH FLUOROSCOPY N/A 11/30/2023    Procedure: BRONCHOSCOPY, WITH FLUOROSCOPY;  Surgeon: Lisa Villarreal MD;  Location: [facility];  Service: Pulmonary;  Laterality: N/A;    CARDIAC ELECTROPHYSIOLOGY MAPPING AND ABLATION  2017    SVT    CHOLECYSTECTOMY  2011    COLONOSCOPY N/A 5/3/2022    Procedure: COLONOSCOPY;  Surgeon: Shan Jean III, MD;  Location: [facility];  Service: Endoscopy;  Laterality: N/A;    COLONOSCOPY N/A 3/16/2024    Procedure: COLONOSCOPY;  Surgeon: ALEKSANDER Ramos MD;  Location: [facility];  Service: Endoscopy;  Laterality: N/A;    COLONOSCOPY N/A 1/17/2025    Procedure: COLONOSCOPY;  Surgeon: Russ Rowe MD;  Location: [facility];  Service: Endoscopy;  Laterality: N/A;    COLONOSCOPY WITH FECAL MICROBIOTA TRANSFER N/A 3/21/2023    Procedure: COLONOSCOPY, WITH FECAL MICROBIOTA TRANSFER;  Surgeon: David Millan MD;  Location: [facility];  Service: Endoscopy;  Laterality: N/A;    ESOPHAGOGASTRODUODENOSCOPY N/A 4/24/2023    Procedure: EGD (ESOPHAGOGASTRODUODENOSCOPY);  Surgeon: Shan Jean III, MD;  Location: [facility];  Service: Endoscopy;  Laterality: N/A;    ESOPHAGOGASTRODUODENOSCOPY N/A 6/5/2024    Procedure: EGD (ESOPHAGOGASTRODUODENOSCOPY);  Surgeon: Odalis Mccabe MD;  Location: [facility];  Service: Endoscopy;  Laterality: N/A;    FLEXIBLE SIGMOIDOSCOPY N/A 6/5/2024    Procedure: SIGMOIDOSCOPY, FLEXIBLE;  Surgeon: Odalis Mccabe MD;   Location: [facility] ENDO;  Service: Endoscopy;  Laterality: N/A;    FLEXIBLE SIGMOIDOSCOPY N/A 7/16/2024    Procedure: SIGMOIDOSCOPY, FLEXIBLE;  Surgeon: Shan Jean III, MD;  Location: [facility] ENDO;  Service: Endoscopy;  Laterality: N/A;    FLEXIBLE SIGMOIDOSCOPY N/A 8/13/2024    Procedure: SIGMOIDOSCOPY, FLEXIBLE;  Surgeon: Shan Jean III, MD;  Location: [facility] ENDO;  Service: Endoscopy;  Laterality: N/A;    GANGLION CYST EXCISION Left 1992    UMBILICAL HERNIA REPAIR  2011    with mesh       Review of patient's allergies indicates:  No Known Allergies    Current Facility-Administered Medications on File Prior to Encounter   Medication    lactated ringers infusion     Current Outpatient Medications on File Prior to Encounter   Medication Sig    busPIRone (BUSPAR) 15 MG tablet Take 1 tablet (15 mg total) by mouth 3 (three) times daily.    diazePAM (VALIUM) 5 MG tablet Take 1 tablet (5 mg total) by mouth daily as needed for Anxiety.    ergocalciferol (ERGOCALCIFEROL) 50,000 unit Cap Take 1 capsule by mouth every Sunday.    EScitalopram oxalate (LEXAPRO) 20 MG tablet Take 1 tablet (20 mg total) by mouth once daily.    ketorolac (TORADOL) 10 mg tablet Take 1 tablet (10 mg total) by mouth every 6 (six) hours as needed for Pain.    methocarbamoL (ROBAXIN) 750 MG Tab Take 1 tablet (750 mg total) by mouth 4 (four) times daily as needed (muscle spasm).    metoprolol succinate (TOPROL-XL) 50 MG 24 hr tablet Take 1 tablet (50 mg total) by mouth once daily.    oxyCODONE (ROXICODONE) 10 mg Tab immediate release tablet Take 1 tablet (10 mg total) by mouth every 6 (six) hours as needed for Pain.    pantoprazole (PROTONIX) 40 MG tablet Take 40 mg by mouth 2 (two) times daily.    predniSONE (DELTASONE) 20 MG tablet Take 3 tablets (60 mg total) by mouth once daily. for 4 days    pregabalin (LYRICA) 50 MG capsule Take 1 capsule by mouth 2 (two) times daily.    promethazine (PHENERGAN) 25 MG tablet Take 25 mg by mouth 4  (four) times daily as needed for Nausea.    simvastatin (ZOCOR) 20 MG tablet Take 1 tablet (20 mg total) by mouth every evening.    tofacitinib (XELJANZ) 10 mg Tab Take 10 mg by mouth 2 (two) times daily.    zolpidem (AMBIEN) 10 mg Tab Take 1 tablet (10 mg total) by mouth nightly as needed (insomnia).    [DISCONTINUED] budesonide (ENTOCORT EC) 3 mg capsule Take 3 capsules (9 mg total) by mouth once daily. (Patient not taking: Reported on 3/5/2025)    [DISCONTINUED] Lactobac 2-Bifido 1-S. therm (VSL#3) 112.5 billion cell Cap Take 1 capsule by mouth twice a day (Patient not taking: Reported on 3/5/2025)     Family History       Problem Relation (Age of Onset)    Asthma Mother          Tobacco Use    Smoking status: Former     Average packs/day: 1 pack/day for 30.0 years (30.0 ttl pk-yrs)     Types: Cigarettes     Start date: 1993    Smokeless tobacco: Never    Tobacco comments:     Pt states he has stopped smoking with Chantix three weeks ago. 12/1/23   Substance and Sexual Activity    Alcohol use: Yes     Comment: ocass    Drug use: Not Currently    Sexual activity: Not Currently     Review of Systems   Constitutional:  Negative for chills and fever.   HENT:  Negative for congestion and sore throat.    Eyes:  Negative for visual disturbance.   Respiratory:  Negative for cough and shortness of breath.    Cardiovascular:  Negative for chest pain and palpitations.   Gastrointestinal:  Negative for abdominal pain, constipation, diarrhea, nausea and vomiting.   Endocrine: Negative for cold intolerance and heat intolerance.   Genitourinary:  Negative for dysuria and hematuria.   Musculoskeletal:  Positive for back pain. Negative for arthralgias and myalgias.   Skin:  Negative for rash.   Neurological:  Negative for tremors and seizures.   Hematological:  Negative for adenopathy. Does not bruise/bleed easily.   All other systems reviewed and are negative.    Objective:     Vital Signs (Most Recent):  Temp: 97.7 °F (36.5 °C)  (03/09/25 2339)  Pulse: 66 (03/09/25 2339)  Resp: 18 (03/09/25 2339)  BP: (!) 156/72 (03/09/25 2339)  SpO2: 98 % (03/09/25 2339) Vital Signs (24h Range):  Temp:  [97.4 °F (36.3 °C)-97.7 °F (36.5 °C)] 97.7 °F (36.5 °C)  Pulse:  [59-76] 66  Resp:  [17-28] 18  SpO2:  [96 %-99 %] 98 %  BP: (124-160)/(58-72) 156/72     Weight: 132.9 kg (293 lb)  Body mass index is 44.55 kg/m².     Physical Exam  Vitals and nursing note reviewed.   Constitutional:       Appearance: Normal appearance. He is well-developed.   HENT:      Head: Normocephalic and atraumatic.      Nose: Nose normal. No septal deviation.   Eyes:      Conjunctiva/sclera: Conjunctivae normal.      Pupils: Pupils are equal, round, and reactive to light.   Neck:      Thyroid: No thyroid mass.      Vascular: No JVD.      Trachea: No tracheal tenderness or tracheal deviation.   Cardiovascular:      Rate and Rhythm: Normal rate and regular rhythm.      Heart sounds: S1 normal and S2 normal. No murmur heard.     No friction rub. No gallop.   Pulmonary:      Effort: Pulmonary effort is normal.      Breath sounds: Normal breath sounds. No decreased breath sounds, wheezing, rhonchi or rales.   Abdominal:      General: Bowel sounds are normal. There is no distension.      Palpations: Abdomen is soft. There is no hepatomegaly, splenomegaly or mass.      Tenderness: There is no abdominal tenderness.   Musculoskeletal:      Thoracic back: Spasms and tenderness present. Decreased range of motion.   Skin:     General: Skin is warm.      Findings: No rash.   Neurological:      General: No focal deficit present.      Mental Status: He is alert and oriented to person, place, and time. Mental status is at baseline.      GCS: GCS eye subscore is 4. GCS verbal subscore is 5. GCS motor subscore is 6.      Cranial Nerves: No cranial nerve deficit.      Sensory: Sensation is intact. No sensory deficit.      Motor: Motor function is intact.   Psychiatric:         Mood and Affect: Mood  normal.         Behavior: Behavior normal.              CRANIAL NERVES     CN III, IV, VI   Pupils are equal, round, and reactive to light.       Significant Labs: All pertinent labs within the past 24 hours have been reviewed.  CBC:   Recent Labs   Lab 03/09/25  2220   WBC 8.66   HGB 9.3*   HCT 30.6*        CMP:   Recent Labs   Lab 03/09/25  2220      K 4.5      CO2 30*   *   BUN 17   CREATININE 1.0   CALCIUM 9.1   PROT 6.8   ALBUMIN 3.6   BILITOT 0.4   ALKPHOS 66   AST 11   ALT 12   ANIONGAP 8       Significant Imaging:   Imaging Results    None         Assessment/Plan:     * Intractable pain  Admit to obs  Was discharged on 3/7 after admit for acute neurological deficit   Findings included worsening of his chronic thoracic vertebral fractures  He states that he was hoping for SNF placement as he is unable to care for himself due to his living conditions and chronic pain  Returned today for exacerbation of chronic pain, unable to be controlled with several doses of IV pain medication    Consult case management to discuss placement in a facility or home care  Pain meds IV along with his regular prescribed pain meds      Morbid obesity  Body mass index is 44.55 kg/m². Morbid obesity complicates all aspects of disease management from diagnostic modalities to treatment. Weight loss encouraged and health benefits explained to patient.         Essential hypertension  Patient's blood pressure range in the last 24 hours was: BP  Min: 124/58  Max: 160/72.The patient's inpatient anti-hypertensive regimen is listed below:  Current Antihypertensives  metoprolol succinate (TOPROL-XL) 24 hr tablet 50 mg, Daily, Oral    Plan  - BP is controlled, no changes needed to their regimen  -     HLD (hyperlipidemia)   Patient is chronically on statin.will continue for now. Monitor clinically. Last LDL was   Lab Results   Component Value Date    LDLCALC 61.6 (L) 03/05/2025            Anemia  Anemia is likely due  "to chronic blood loss and chronic disease. Most recent hemoglobin and hematocrit are listed below.  Recent Labs     03/07/25  0458   HGB 8.1*   HCT 26.2*     Plan  - Monitor serial CBC: Daily  - Transfuse PRBC if patient becomes hemodynamically unstable, symptomatic or H/H drops below 7/21.  - Patient has not received any PRBC transfusions to date  - Patient's anemia is currently stable  -     Ulcerative colitis  Noted, chronic  Currently on a steroid regimen from previous admit, will continue      Type 2 diabetes mellitus without complication, without long-term current use of insulin  Patient's FSGs are controlled on current medication regimen.  Last A1c reviewed-   Lab Results   Component Value Date    HGBA1C 5.3 03/05/2025     Most recent fingerstick glucose reviewed- No results for input(s): "POCTGLUCOSE" in the last 24 hours.  Current correctional scale  Medium  Maintain anti-hyperglycemic dose as follows-   Antihyperglycemics (From admission, onward)      Start     Stop Route Frequency Ordered    03/09/25 2325  insulin aspart U-100 pen 0-10 Units         -- SubQ Before meals & nightly PRN 03/09/25 2226          Hold Oral hypoglycemics while patient is in the hospital.          VTE Risk Mitigation (From admission, onward)           Ordered     enoxaparin injection 40 mg  Every 12 hours         03/09/25 2228     IP VTE HIGH RISK PATIENT  Once         03/09/25 2226     Place sequential compression device  Until discontinued         03/09/25 2226                               Pharmacist Renal Dose Adjustment Note    Jacoby Jean-Baptiste is a 50 y.o. male being treated with the medication Enoxaparin    Patient Data:    Vital Signs (Most Recent):  Temp: 97.4 °F (36.3 °C) (03/09/25 1406)  Pulse: 76 (03/09/25 2056)  Resp: (!) 28 (03/09/25 2056)  BP: (!) 124/58 (03/09/25 2056)  SpO2: 98 % (03/09/25 1926) Vital Signs (72h Range):  Temp:  [97.4 °F (36.3 °C)]   Pulse:  [59-76]   Resp:  [17-28]   BP: (124-160)/(58-72)   SpO2:  " [96 %-99 %]      Recent Labs   Lab 03/05/25  1408 03/06/25  0706 03/07/25  0458   CREATININE 1.0 0.9 0.9     Serum creatinine: 0.9 mg/dL 03/07/25 0458  Estimated creatinine clearance: 129.4 mL/min    Medication:Enoxaparin dose: 40 mg frequency Daily will be changed to medication:Enoxaparin dose:40 mg frequency:Q12H    Pharmacist's Name: Irma Garcia  Pharmacist's Extension: [phone]      JAMIA Stratton  Department of Hospital Medicine  [facility]/Surg

## 2025-03-11 LAB
ALBUMIN SERPL BCP-MCNC: 2.8 G/DL (ref 3.5–5.2)
ALP SERPL-CCNC: 61 U/L (ref 40–150)
ALT SERPL W/O P-5'-P-CCNC: 13 U/L (ref 10–44)
ANION GAP SERPL CALC-SCNC: 9 MMOL/L (ref 8–16)
AST SERPL-CCNC: 10 U/L (ref 10–40)
BASOPHILS # BLD AUTO: 0 K/UL (ref 0–0.2)
BASOPHILS NFR BLD: 0 % (ref 0–1.9)
BILIRUB SERPL-MCNC: 0.3 MG/DL (ref 0.1–1)
BUN SERPL-MCNC: 24 MG/DL (ref 6–20)
CALCIUM SERPL-MCNC: 8.4 MG/DL (ref 8.7–10.5)
CHLORIDE SERPL-SCNC: 103 MMOL/L (ref 95–110)
CO2 SERPL-SCNC: 25 MMOL/L (ref 23–29)
CREAT SERPL-MCNC: 1 MG/DL (ref 0.5–1.4)
DIFFERENTIAL METHOD BLD: ABNORMAL
EOSINOPHIL # BLD AUTO: 0 K/UL (ref 0–0.5)
EOSINOPHIL NFR BLD: 0.2 % (ref 0–8)
ERYTHROCYTE [DISTWIDTH] IN BLOOD BY AUTOMATED COUNT: 19.9 % (ref 11.5–14.5)
EST. GFR  (NO RACE VARIABLE): >60 ML/MIN/1.73 M^2
GLUCOSE SERPL-MCNC: 122 MG/DL (ref 70–110)
HCT VFR BLD AUTO: 25.9 % (ref 40–54)
HGB BLD-MCNC: 7.9 G/DL (ref 14–18)
IMM GRANULOCYTES # BLD AUTO: 0.05 K/UL (ref 0–0.04)
IMM GRANULOCYTES NFR BLD AUTO: 0.6 % (ref 0–0.5)
LYMPHOCYTES # BLD AUTO: 1.1 K/UL (ref 1–4.8)
LYMPHOCYTES NFR BLD: 13 % (ref 18–48)
MAGNESIUM SERPL-MCNC: 1.6 MG/DL (ref 1.6–2.6)
MCH RBC QN AUTO: 27.9 PG (ref 27–31)
MCHC RBC AUTO-ENTMCNC: 30.5 G/DL (ref 32–36)
MCV RBC AUTO: 92 FL (ref 82–98)
MONOCYTES # BLD AUTO: 0.8 K/UL (ref 0.3–1)
MONOCYTES NFR BLD: 10.3 % (ref 4–15)
NEUTROPHILS # BLD AUTO: 6.2 K/UL (ref 1.8–7.7)
NEUTROPHILS NFR BLD: 75.9 % (ref 38–73)
NRBC BLD-RTO: 0 /100 WBC
PHOSPHATE SERPL-MCNC: 3.6 MG/DL (ref 2.7–4.5)
PLATELET # BLD AUTO: 208 K/UL (ref 150–450)
PMV BLD AUTO: 10.4 FL (ref 9.2–12.9)
POCT GLUCOSE: 100 MG/DL (ref 70–110)
POCT GLUCOSE: 122 MG/DL (ref 70–110)
POCT GLUCOSE: 154 MG/DL (ref 70–110)
POCT GLUCOSE: 175 MG/DL (ref 70–110)
POTASSIUM SERPL-SCNC: 3.8 MMOL/L (ref 3.5–5.1)
PROT SERPL-MCNC: 5.8 G/DL (ref 6–8.4)
RBC # BLD AUTO: 2.83 M/UL (ref 4.6–6.2)
SODIUM SERPL-SCNC: 137 MMOL/L (ref 136–145)
WBC # BLD AUTO: 8.18 K/UL (ref 3.9–12.7)

## 2025-03-11 PROCEDURE — 97110 THERAPEUTIC EXERCISES: CPT

## 2025-03-11 PROCEDURE — 80053 COMPREHEN METABOLIC PANEL: CPT | Performed by: NURSE PRACTITIONER

## 2025-03-11 PROCEDURE — 11000001 HC ACUTE MED/SURG PRIVATE ROOM

## 2025-03-11 PROCEDURE — 36415 COLL VENOUS BLD VENIPUNCTURE: CPT | Performed by: NURSE PRACTITIONER

## 2025-03-11 PROCEDURE — 25000003 PHARM REV CODE 250: Performed by: NURSE PRACTITIONER

## 2025-03-11 PROCEDURE — 63600175 PHARM REV CODE 636 W HCPCS: Performed by: NURSE PRACTITIONER

## 2025-03-11 PROCEDURE — 85025 COMPLETE CBC W/AUTO DIFF WBC: CPT | Performed by: NURSE PRACTITIONER

## 2025-03-11 PROCEDURE — 84100 ASSAY OF PHOSPHORUS: CPT | Performed by: NURSE PRACTITIONER

## 2025-03-11 PROCEDURE — 63600175 PHARM REV CODE 636 W HCPCS: Performed by: EMERGENCY MEDICINE

## 2025-03-11 PROCEDURE — 83735 ASSAY OF MAGNESIUM: CPT | Performed by: NURSE PRACTITIONER

## 2025-03-11 RX ORDER — POLYETHYLENE GLYCOL 3350 17 G/17G
17 POWDER, FOR SOLUTION ORAL 2 TIMES DAILY
Status: DISCONTINUED | OUTPATIENT
Start: 2025-03-11 | End: 2025-03-17 | Stop reason: HOSPADM

## 2025-03-11 RX ORDER — IBUPROFEN 600 MG/1
600 TABLET ORAL EVERY 6 HOURS PRN
Status: DISCONTINUED | OUTPATIENT
Start: 2025-03-11 | End: 2025-03-17 | Stop reason: HOSPADM

## 2025-03-11 RX ADMIN — ATORVASTATIN CALCIUM 10 MG: 10 TABLET, FILM COATED ORAL at 09:03

## 2025-03-11 RX ADMIN — DIAZEPAM 5 MG: 5 TABLET ORAL at 12:03

## 2025-03-11 RX ADMIN — POTASSIUM BICARBONATE 50 MEQ: 977.5 TABLET, EFFERVESCENT ORAL at 09:03

## 2025-03-11 RX ADMIN — Medication 800 MG: at 09:03

## 2025-03-11 RX ADMIN — PANTOPRAZOLE SODIUM 40 MG: 40 TABLET, DELAYED RELEASE ORAL at 09:03

## 2025-03-11 RX ADMIN — KETOROLAC TROMETHAMINE 10 MG: 10 TABLET, FILM COATED ORAL at 12:03

## 2025-03-11 RX ADMIN — ENOXAPARIN SODIUM 40 MG: 40 INJECTION SUBCUTANEOUS at 09:03

## 2025-03-11 RX ADMIN — Medication 800 MG: at 03:03

## 2025-03-11 RX ADMIN — ONDANSETRON 8 MG: 2 INJECTION INTRAMUSCULAR; INTRAVENOUS at 02:03

## 2025-03-11 RX ADMIN — METHOCARBAMOL TABLETS 750 MG: 750 TABLET, COATED ORAL at 06:03

## 2025-03-11 RX ADMIN — HYDROMORPHONE HYDROCHLORIDE 1 MG: 1 INJECTION, SOLUTION INTRAMUSCULAR; INTRAVENOUS; SUBCUTANEOUS at 06:03

## 2025-03-11 RX ADMIN — MELATONIN TAB 3 MG 9 MG: 3 TAB at 12:03

## 2025-03-11 RX ADMIN — HYDROMORPHONE HYDROCHLORIDE 1 MG: 1 INJECTION, SOLUTION INTRAMUSCULAR; INTRAVENOUS; SUBCUTANEOUS at 04:03

## 2025-03-11 RX ADMIN — PREGABALIN 50 MG: 25 CAPSULE ORAL at 09:03

## 2025-03-11 RX ADMIN — PANTOPRAZOLE SODIUM 40 MG: 40 TABLET, DELAYED RELEASE ORAL at 08:03

## 2025-03-11 RX ADMIN — ONDANSETRON 8 MG: 2 INJECTION INTRAMUSCULAR; INTRAVENOUS at 08:03

## 2025-03-11 RX ADMIN — ENOXAPARIN SODIUM 40 MG: 40 INJECTION SUBCUTANEOUS at 08:03

## 2025-03-11 RX ADMIN — HYDROMORPHONE HYDROCHLORIDE 1 MG: 1 INJECTION, SOLUTION INTRAMUSCULAR; INTRAVENOUS; SUBCUTANEOUS at 08:03

## 2025-03-11 RX ADMIN — ZOLPIDEM TARTRATE 10 MG: 5 TABLET ORAL at 10:03

## 2025-03-11 RX ADMIN — OXYCODONE HYDROCHLORIDE 10 MG: 5 TABLET ORAL at 02:03

## 2025-03-11 RX ADMIN — ESCITALOPRAM OXALATE 20 MG: 10 TABLET, FILM COATED ORAL at 09:03

## 2025-03-11 RX ADMIN — METHOCARBAMOL TABLETS 750 MG: 750 TABLET, COATED ORAL at 01:03

## 2025-03-11 RX ADMIN — OXYCODONE HYDROCHLORIDE 10 MG: 5 TABLET ORAL at 12:03

## 2025-03-11 RX ADMIN — BUSPIRONE HYDROCHLORIDE 15 MG: 7.5 TABLET ORAL at 02:03

## 2025-03-11 RX ADMIN — BUSPIRONE HYDROCHLORIDE 15 MG: 7.5 TABLET ORAL at 08:03

## 2025-03-11 RX ADMIN — OXYCODONE HYDROCHLORIDE 10 MG: 5 TABLET ORAL at 08:03

## 2025-03-11 RX ADMIN — PREDNISONE 60 MG: 20 TABLET ORAL at 09:03

## 2025-03-11 RX ADMIN — BUSPIRONE HYDROCHLORIDE 15 MG: 7.5 TABLET ORAL at 09:03

## 2025-03-11 RX ADMIN — HYDROMORPHONE HYDROCHLORIDE 1 MG: 1 INJECTION, SOLUTION INTRAMUSCULAR; INTRAVENOUS; SUBCUTANEOUS at 10:03

## 2025-03-11 RX ADMIN — METOPROLOL SUCCINATE 50 MG: 50 TABLET, EXTENDED RELEASE ORAL at 09:03

## 2025-03-11 RX ADMIN — PREGABALIN 50 MG: 25 CAPSULE ORAL at 08:03
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
## 2025-03-11 NOTE — PLAN OF CARE
[facility]/Surg  Discharge Reassessment    Primary Care Provider: Hunter Aguilar III, MD    Expected Discharge Date: 3/12/2025    Pt accepted at [facility], auth pending. Pt aware of acceptance and in agreement.  Pt will need to be off IV pain medications for at least 24 hours before admit to rehab.   Spoke with Adrian from [facility] brace regarding pt's TLSO brace. States he will send someone out this afternoon/evening to resize brace if able    Reassessment (most recent)       Discharge Reassessment - 03/11/25 1122          Discharge Reassessment    Did the patient's condition or plan change since previous assessment? No     Discharge Plan discussed with: Patient     Communicated GERARDO with patient/caregiver Yes     Discharge Plan A Rehab     Transition of Care Barriers None     Why the patient remains in the hospital Requires continued medical care        Post-Acute Status    Post-Acute Authorization Placement     Post-Acute Placement Status Pending payor review/awaiting authorization (if required)

## 2025-03-11 NOTE — ASSESSMENT & PLAN NOTE
Anemia is likely due to chronic blood loss and chronic disease. Most recent hemoglobin and hematocrit are listed below.  Recent Labs     03/09/25  2220 03/10/25  0433 03/11/25  0445   HGB 9.3* 8.3* 7.9*   HCT 30.6* 27.1* 25.9*     Plan  - Monitor serial CBC: Daily  - Transfuse PRBC if patient becomes hemodynamically unstable, symptomatic or H/H drops below 7/21.  - Patient has not received any PRBC transfusions to date  - Patient's anemia is currently stable  -

## 2025-03-11 NOTE — ASSESSMENT & PLAN NOTE
Patient's FSGs are controlled on current medication regimen.  Last A1c reviewed-   Lab Results   Component Value Date    HGBA1C 5.3 03/05/2025     Most recent fingerstick glucose reviewed-   Recent Labs   Lab 03/10/25  1642 03/10/25  2104 03/11/25  0743 03/11/25  1122   POCTGLUCOSE 164* 168* 100 122*     Current correctional scale  Medium  Maintain anti-hyperglycemic dose as follows-   Antihyperglycemics (From admission, onward)      Start     Stop Route Frequency Ordered    03/09/25 2325  insulin aspart U-100 pen 0-10 Units         -- SubQ Before meals & nightly PRN 03/09/25 2226          Hold Oral hypoglycemics while patient is in the hospital.

## 2025-03-11 NOTE — PT/OT/SLP PROGRESS
"Occupational Therapy      Patient Name:  Jacoby Jean-Baptiste   MRN:  [id number]    Patient not seen today secondary to  (Attempted OT tx. Patient refused stating "I can't do therapy today."). Will follow-up when appropriate.    3/11/2025  "

## 2025-03-11 NOTE — PT/OT/SLP PROGRESS
Physical Therapy Treatment    Patient Name:  Jacoby Jean-Baptiste   MRN:  [id number]    Recommendations:     Discharge Recommendations: High Intensity Therapy vs ELIZABETH  Discharge Equipment Recommendations: none  Barriers to discharge: Decreased caregiver support    Assessment:     Jacoby Jean-Baptiste is a 50 y.o. male admitted with a medical diagnosis of Intractable pain.  He presents with the following impairments/functional limitations: weakness, impaired endurance, impaired functional mobility, gait instability, impaired balance, decreased lower extremity function, pain, orthopedic precautions .    Pt seen supine in bed, alert. Pt c/o back and abdominal pain. Pt agreeable to thera ex in supine including isometrics. Pt encouraged exercises and activities  ELIZABETH.    Rehab Prognosis: Fair; patient would benefit from acute skilled PT services to address these deficits and reach maximum level of function.    Recent Surgery: * No surgery found *      Plan:     During this hospitalization, patient to be seen 6 x/week to address the identified rehab impairments via gait training, therapeutic activities, therapeutic exercises and progress toward the following goals:    Plan of Care Expires:  03/30/25    Subjective     Chief Complaint: back and abdominal pain  Patient/Family Comments/goals: get well  Pain/Comfort:  Pain Rating 1: 8/10  Location 1: back (and abdomen)  Pain Addressed 1: Pre-medicate for activity, Reposition, Distraction      Objective:     Communicated with nurse toby prior to session.  Patient found HOB elevated with peripheral IV upon PT entry to room.     General Precautions: Standard, special contact, fall  Orthopedic Precautions:    Braces: TLSO  Respiratory Status: Room air     Functional Mobility:  Bed Mobility:     Scooting: minimum assistance      AM-PAC 6 CLICK MOBILITY          Treatment & Education:  Patient was educated on the importance of OOB activity and functional mobility to negate negative effects of  prolonged bed rest during hospitalization, safe transfers and ambulation, and D/C planning   Thera ex in supine including isometrics    Patient left HOB elevated with all lines intact, call button in reach, and bed alarm on..    GOALS:   Multidisciplinary Problems       Physical Therapy Goals          Problem: Physical Therapy    Goal Priority Disciplines Outcome Interventions   Physical Therapy Goal     PT, PT/OT Progressing    Description: Goals to be met by: 2025     Patient will increase functional independence with mobility by performin. Supine to sit with Modified [place]  2. Sit to stand transfer with Contact Guard Assistance  3. Bed to chair transfer with Contact Guard Assistance using Rolling Walker  4. Gait  x 50 feet with Minimal Assistance using Rolling Walker.   5. Lower extremity exercise program x20 reps                       DME Justifications:  No DME recommended requiring DME justifications    Time Tracking:     PT Received On: 25  PT Start Time: 1112     PT Stop Time: 1120  PT Total Time (min): 8 min     Billable Minutes: Therapeutic Exercise 8    Treatment Type: Treatment  PT/PTA: PT     Number of PTA visits since last PT visit: 0     2025

## 2025-03-11 NOTE — ASSESSMENT & PLAN NOTE
Patient's blood pressure range in the last 24 hours was: BP  Min: 104/53  Max: 136/78.The patient's inpatient anti-hypertensive regimen is listed below:  Current Antihypertensives  metoprolol succinate (TOPROL-XL) 24 hr tablet 50 mg, Daily, Oral    Plan  - BP is controlled, no changes needed to their regimen  -

## 2025-03-11 NOTE — ASSESSMENT & PLAN NOTE
Admit to med/surg   Was discharged on 3/7 after admit for acute neurological deficit   Findings included worsening of his chronic thoracic vertebral fractures  He states that he was hoping for SNF placement as he is unable to care for himself due to his living conditions and chronic pain  Returned today for exacerbation of chronic pain, unable to be controlled with several doses of IV pain medication    Consult case management to discuss placement in a facility or home care  Pain meds IV along with his regular prescribed pain meds  Accepted to [facility], pending insurance authorization

## 2025-03-11 NOTE — PLAN OF CARE
Problem: Physical Therapy  Goal: Physical Therapy Goal  Description: Goals to be met by: 2025     Patient will increase functional independence with mobility by performin. Supine to sit with Modified [place]  2. Sit to stand transfer with Contact Guard Assistance  3. Bed to chair transfer with Contact Guard Assistance using Rolling Walker  4. Gait  x 50 feet with Minimal Assistance using Rolling Walker.   5. Lower extremity exercise program x20 reps  Outcome: Progressing   C/o back and abdominal pain. Thera ex in supine. ELIZABETH vs HIT
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
## 2025-03-11 NOTE — PLAN OF CARE
Problem: Adult Inpatient Plan of Care  Goal: Plan of Care Review  Outcome: Progressing  Goal: Patient-Specific Goal (Individualized)  Outcome: Progressing  Goal: Absence of Hospital-Acquired Illness or Injury  Outcome: Progressing  Goal: Optimal Comfort and Wellbeing  Outcome: Progressing  Goal: Readiness for Transition of Care  Outcome: Progressing     Problem: Bariatric Environmental Safety  Goal: Safety Maintained with Care  Outcome: Progressing     Problem: Skin Injury Risk Increased  Goal: Skin Health and Integrity  Outcome: Progressing     Problem: Diabetes Comorbidity  Goal: Blood Glucose Level Within Targeted Range  Outcome: Progressing    POC reviewed with patient. Verbalized understanding. Pain controlled on current regimen with some breakthrough reported. Zofran administered twice for nausea, but no episodes of vomiting. Potassium replaced x1. Magnesium replaced x2. Up to BR without difficulty. Refused Miralax. BS hypoactive. Encouraged increased fluid intake. No orders to discharge at this time.

## 2025-03-11 NOTE — SUBJECTIVE & OBJECTIVE
Interval History:   Patient seen and examined.  NAD.  NAEO.  VSS.  IP[facility] placement pending insurance authorization.  Review of Systems   Constitutional:  Negative for activity change, appetite change, chills, fatigue and fever.   HENT:  Negative for congestion, facial swelling, hearing loss, rhinorrhea, sinus pressure and sinus pain.    Eyes:  Negative for photophobia and visual disturbance.   Respiratory:  Negative for cough, chest tightness, shortness of breath and wheezing.    Cardiovascular:  Negative for chest pain, palpitations and leg swelling.   Gastrointestinal:  Negative for abdominal distention, abdominal pain, constipation, diarrhea, nausea and vomiting.   Endocrine: Negative.    Genitourinary:  Negative for decreased urine volume, difficulty urinating, dysuria and urgency.   Musculoskeletal:  Positive for back pain. Negative for arthralgias, gait problem, joint swelling, myalgias, neck pain and neck stiffness.   Skin: Negative.    Allergic/Immunologic: Negative.    Neurological:  Negative for tremors, facial asymmetry, speech difficulty, weakness, light-headedness, numbness and headaches.   Hematological: Negative.    Psychiatric/Behavioral: Negative.       Objective:     Vital Signs (Most Recent):  Temp: 98.2 °F (36.8 °C) (03/11/25 1126)  Pulse: 63 (03/11/25 1126)  Resp: 16 (03/11/25 1126)  BP: (!) 111/56 (03/11/25 1126)  SpO2: 99 % (03/11/25 1126) Vital Signs (24h Range):  Temp:  [97.3 °F (36.3 °C)-98.3 °F (36.8 °C)] 98.2 °F (36.8 °C)  Pulse:  [62-78] 63  Resp:  [15-18] 16  SpO2:  [95 %-99 %] 99 %  BP: (104-136)/(53-78) 111/56     Weight: 132.9 kg (292 lb 15.9 oz)  Body mass index is 44.55 kg/m².    Intake/Output Summary (Last 24 hours) at 3/11/2025 1216  Last data filed at 3/11/2025 0603  Gross per 24 hour   Intake 570 ml   Output 275 ml   Net 295 ml         Physical Exam  Vitals and nursing note reviewed.   Constitutional:       Appearance: Normal appearance. He is well-developed.   HENT:      Head:  Normocephalic and atraumatic.      Nose: Nose normal. No septal deviation.   Eyes:      Conjunctiva/sclera: Conjunctivae normal.      Pupils: Pupils are equal, round, and reactive to light.   Neck:      Thyroid: No thyroid mass.      Vascular: No JVD.      Trachea: No tracheal tenderness or tracheal deviation.   Cardiovascular:      Rate and Rhythm: Normal rate and regular rhythm.      Heart sounds: S1 normal and S2 normal. No murmur heard.     No friction rub. No gallop.   Pulmonary:      Effort: Pulmonary effort is normal.      Breath sounds: Normal breath sounds. No decreased breath sounds, wheezing, rhonchi or rales.   Abdominal:      General: Bowel sounds are normal. There is no distension.      Palpations: Abdomen is soft. There is no hepatomegaly, splenomegaly or mass.      Tenderness: There is no abdominal tenderness.   Musculoskeletal:      Thoracic back: Tenderness present. Decreased range of motion.   Skin:     General: Skin is warm.      Findings: No rash.   Neurological:      General: No focal deficit present.      Mental Status: He is alert and oriented to person, place, and time.      Cranial Nerves: No cranial nerve deficit.      Sensory: No sensory deficit.   Psychiatric:         Mood and Affect: Mood normal.         Behavior: Behavior normal.               Significant Labs: All pertinent labs within the past 24 hours have been reviewed.  CBC:   Recent Labs   Lab 03/09/25 2220 03/10/25  0433 03/11/25  0445   WBC 8.66 9.60 8.18   HGB 9.3* 8.3* 7.9*   HCT 30.6* 27.1* 25.9*    233 208     CMP:   Recent Labs   Lab 03/09/25  2220 03/10/25  0433 03/11/25  0446    137 137   K 4.5 4.6 3.8    103 103   CO2 30* 26 25   * 106 122*   BUN 17 16 24*   CREATININE 1.0 0.9 1.0   CALCIUM 9.1 8.6* 8.4*   PROT 6.8 6.1 5.8*   ALBUMIN 3.6 2.9* 2.8*   BILITOT 0.4 0.4 0.3   ALKPHOS 66 64 61   AST 11 11 10   ALT 12 13 13   ANIONGAP 8 8 9     Magnesium:   Recent Labs   Lab 03/10/25  0433  03/11/25  0446   MG 1.6 1.6     POCT Glucose:   Recent Labs   Lab 03/10/25  2104 03/11/25  0743 03/11/25  1122   POCTGLUCOSE 168* 100 122*     Lab Results   Component Value Date    CALCIUM 8.4 (L) 03/11/2025    PHOS 3.6 03/11/2025        Significant Imaging: I have reviewed all pertinent imaging results/findings within the past 24 hours.  Imaging Results    None

## 2025-03-11 NOTE — NURSING
Pt's blood sugar was 175.  Pt refused insulin coverage and said that it will come down on it's own.  Will CTM.

## 2025-03-12 PROBLEM — T40.2X5A OPIOID-INDUCED CONSTIPATION: Status: ACTIVE | Noted: 2025-03-12

## 2025-03-12 PROBLEM — K59.03 OPIOID-INDUCED CONSTIPATION: Status: ACTIVE | Noted: 2025-03-12

## 2025-03-12 LAB
ALBUMIN SERPL BCP-MCNC: 3.1 G/DL (ref 3.5–5.2)
ALP SERPL-CCNC: 67 U/L (ref 40–150)
ALT SERPL W/O P-5'-P-CCNC: 14 U/L (ref 10–44)
ANION GAP SERPL CALC-SCNC: 9 MMOL/L (ref 8–16)
AST SERPL-CCNC: 13 U/L (ref 10–40)
BASOPHILS # BLD AUTO: 0.02 K/UL (ref 0–0.2)
BASOPHILS NFR BLD: 0.2 % (ref 0–1.9)
BILIRUB SERPL-MCNC: 0.3 MG/DL (ref 0.1–1)
BUN SERPL-MCNC: 18 MG/DL (ref 6–20)
CALCIUM SERPL-MCNC: 8.8 MG/DL (ref 8.7–10.5)
CHLORIDE SERPL-SCNC: 100 MMOL/L (ref 95–110)
CO2 SERPL-SCNC: 26 MMOL/L (ref 23–29)
CREAT SERPL-MCNC: 0.9 MG/DL (ref 0.5–1.4)
DIFFERENTIAL METHOD BLD: ABNORMAL
EOSINOPHIL # BLD AUTO: 0 K/UL (ref 0–0.5)
EOSINOPHIL NFR BLD: 0.3 % (ref 0–8)
ERYTHROCYTE [DISTWIDTH] IN BLOOD BY AUTOMATED COUNT: 19.5 % (ref 11.5–14.5)
EST. GFR  (NO RACE VARIABLE): >60 ML/MIN/1.73 M^2
GLUCOSE SERPL-MCNC: 104 MG/DL (ref 70–110)
HCT VFR BLD AUTO: 30.2 % (ref 40–54)
HGB BLD-MCNC: 9.3 G/DL (ref 14–18)
IMM GRANULOCYTES # BLD AUTO: 0.08 K/UL (ref 0–0.04)
IMM GRANULOCYTES NFR BLD AUTO: 0.8 % (ref 0–0.5)
LYMPHOCYTES # BLD AUTO: 1.3 K/UL (ref 1–4.8)
LYMPHOCYTES NFR BLD: 13.7 % (ref 18–48)
MAGNESIUM SERPL-MCNC: 1.9 MG/DL (ref 1.6–2.6)
MCH RBC QN AUTO: 28.4 PG (ref 27–31)
MCHC RBC AUTO-ENTMCNC: 30.8 G/DL (ref 32–36)
MCV RBC AUTO: 92 FL (ref 82–98)
MONOCYTES # BLD AUTO: 1 K/UL (ref 0.3–1)
MONOCYTES NFR BLD: 9.7 % (ref 4–15)
NEUTROPHILS # BLD AUTO: 7.3 K/UL (ref 1.8–7.7)
NEUTROPHILS NFR BLD: 75.3 % (ref 38–73)
NRBC BLD-RTO: 0 /100 WBC
PHOSPHATE SERPL-MCNC: 3.2 MG/DL (ref 2.7–4.5)
PLATELET # BLD AUTO: 228 K/UL (ref 150–450)
PMV BLD AUTO: 10.4 FL (ref 9.2–12.9)
POCT GLUCOSE: 112 MG/DL (ref 70–110)
POCT GLUCOSE: 139 MG/DL (ref 70–110)
POCT GLUCOSE: 195 MG/DL (ref 70–110)
POCT GLUCOSE: 90 MG/DL (ref 70–110)
POTASSIUM SERPL-SCNC: 4.2 MMOL/L (ref 3.5–5.1)
PROT SERPL-MCNC: 6.6 G/DL (ref 6–8.4)
RBC # BLD AUTO: 3.27 M/UL (ref 4.6–6.2)
SODIUM SERPL-SCNC: 135 MMOL/L (ref 136–145)
WBC # BLD AUTO: 9.75 K/UL (ref 3.9–12.7)

## 2025-03-12 PROCEDURE — 63600175 PHARM REV CODE 636 W HCPCS: Performed by: NURSE PRACTITIONER

## 2025-03-12 PROCEDURE — 97530 THERAPEUTIC ACTIVITIES: CPT | Mod: CQ

## 2025-03-12 PROCEDURE — 63600175 PHARM REV CODE 636 W HCPCS: Mod: TB,JZ | Performed by: NURSE PRACTITIONER

## 2025-03-12 PROCEDURE — 84100 ASSAY OF PHOSPHORUS: CPT | Performed by: NURSE PRACTITIONER

## 2025-03-12 PROCEDURE — 11000001 HC ACUTE MED/SURG PRIVATE ROOM

## 2025-03-12 PROCEDURE — 25000003 PHARM REV CODE 250: Performed by: NURSE PRACTITIONER

## 2025-03-12 PROCEDURE — 36415 COLL VENOUS BLD VENIPUNCTURE: CPT | Performed by: NURSE PRACTITIONER

## 2025-03-12 PROCEDURE — 83735 ASSAY OF MAGNESIUM: CPT | Performed by: NURSE PRACTITIONER

## 2025-03-12 PROCEDURE — 80053 COMPREHEN METABOLIC PANEL: CPT | Performed by: NURSE PRACTITIONER

## 2025-03-12 PROCEDURE — 63600175 PHARM REV CODE 636 W HCPCS: Performed by: EMERGENCY MEDICINE

## 2025-03-12 PROCEDURE — 85025 COMPLETE CBC W/AUTO DIFF WBC: CPT | Performed by: NURSE PRACTITIONER

## 2025-03-12 PROCEDURE — 97535 SELF CARE MNGMENT TRAINING: CPT

## 2025-03-12 RX ORDER — METHOCARBAMOL 500 MG/1
500 TABLET, FILM COATED ORAL 3 TIMES DAILY
Status: DISCONTINUED | OUTPATIENT
Start: 2025-03-12 | End: 2025-03-12

## 2025-03-12 RX ORDER — OXYCODONE HYDROCHLORIDE 10 MG/1
10 TABLET ORAL
Refills: 0 | Status: DISCONTINUED | OUTPATIENT
Start: 2025-03-12 | End: 2025-03-13

## 2025-03-12 RX ORDER — METHOCARBAMOL 750 MG/1
750 TABLET, FILM COATED ORAL 3 TIMES DAILY
Status: DISCONTINUED | OUTPATIENT
Start: 2025-03-12 | End: 2025-03-17 | Stop reason: HOSPADM

## 2025-03-12 RX ADMIN — BUSPIRONE HYDROCHLORIDE 15 MG: 7.5 TABLET ORAL at 09:03

## 2025-03-12 RX ADMIN — PANTOPRAZOLE SODIUM 40 MG: 40 TABLET, DELAYED RELEASE ORAL at 09:03

## 2025-03-12 RX ADMIN — ENOXAPARIN SODIUM 40 MG: 40 INJECTION SUBCUTANEOUS at 09:03

## 2025-03-12 RX ADMIN — HYDROMORPHONE HYDROCHLORIDE 1 MG: 1 INJECTION, SOLUTION INTRAMUSCULAR; INTRAVENOUS; SUBCUTANEOUS at 09:03

## 2025-03-12 RX ADMIN — OXYCODONE HYDROCHLORIDE 10 MG: 10 TABLET ORAL at 04:03

## 2025-03-12 RX ADMIN — METHOCARBAMOL TABLETS 750 MG: 750 TABLET, COATED ORAL at 07:03

## 2025-03-12 RX ADMIN — ONDANSETRON 8 MG: 2 INJECTION INTRAMUSCULAR; INTRAVENOUS at 06:03

## 2025-03-12 RX ADMIN — METHOCARBAMOL TABLETS 750 MG: 750 TABLET, COATED ORAL at 02:03

## 2025-03-12 RX ADMIN — DIAZEPAM 5 MG: 5 TABLET ORAL at 02:03

## 2025-03-12 RX ADMIN — ESCITALOPRAM OXALATE 20 MG: 10 TABLET, FILM COATED ORAL at 09:03

## 2025-03-12 RX ADMIN — ENOXAPARIN SODIUM 40 MG: 40 INJECTION SUBCUTANEOUS at 08:03

## 2025-03-12 RX ADMIN — OXYCODONE HYDROCHLORIDE 10 MG: 5 TABLET ORAL at 07:03

## 2025-03-12 RX ADMIN — HYDROMORPHONE HYDROCHLORIDE 1 MG: 1 INJECTION, SOLUTION INTRAMUSCULAR; INTRAVENOUS; SUBCUTANEOUS at 05:03

## 2025-03-12 RX ADMIN — PREGABALIN 50 MG: 25 CAPSULE ORAL at 09:03

## 2025-03-12 RX ADMIN — HYDROMORPHONE HYDROCHLORIDE 1 MG: 1 INJECTION, SOLUTION INTRAMUSCULAR; INTRAVENOUS; SUBCUTANEOUS at 11:03

## 2025-03-12 RX ADMIN — BUSPIRONE HYDROCHLORIDE 15 MG: 7.5 TABLET ORAL at 08:03

## 2025-03-12 RX ADMIN — PREGABALIN 50 MG: 25 CAPSULE ORAL at 08:03

## 2025-03-12 RX ADMIN — ATORVASTATIN CALCIUM 10 MG: 10 TABLET, FILM COATED ORAL at 09:03

## 2025-03-12 RX ADMIN — HYDROMORPHONE HYDROCHLORIDE 1 MG: 1 INJECTION, SOLUTION INTRAMUSCULAR; INTRAVENOUS; SUBCUTANEOUS at 01:03

## 2025-03-12 RX ADMIN — OXYCODONE HYDROCHLORIDE 10 MG: 10 TABLET ORAL at 08:03

## 2025-03-12 RX ADMIN — HYDROMORPHONE HYDROCHLORIDE 1 MG: 1 INJECTION, SOLUTION INTRAMUSCULAR; INTRAVENOUS; SUBCUTANEOUS at 06:03

## 2025-03-12 RX ADMIN — PANTOPRAZOLE SODIUM 40 MG: 40 TABLET, DELAYED RELEASE ORAL at 08:03

## 2025-03-12 RX ADMIN — PREDNISONE 60 MG: 20 TABLET ORAL at 09:03

## 2025-03-12 RX ADMIN — OXYCODONE HYDROCHLORIDE 10 MG: 10 TABLET ORAL at 12:03

## 2025-03-12 RX ADMIN — BUSPIRONE HYDROCHLORIDE 15 MG: 7.5 TABLET ORAL at 02:03

## 2025-03-12 RX ADMIN — ZOLPIDEM TARTRATE 10 MG: 5 TABLET ORAL at 09:03

## 2025-03-12 RX ADMIN — METHOCARBAMOL TABLETS 750 MG: 750 TABLET, COATED ORAL at 08:03

## 2025-03-12 RX ADMIN — METOPROLOL SUCCINATE 50 MG: 50 TABLET, EXTENDED RELEASE ORAL at 09:03

## 2025-03-12 NOTE — ASSESSMENT & PLAN NOTE
Patient's blood pressure range in the last 24 hours was: BP  Min: 120/55  Max: 146/64.The patient's inpatient anti-hypertensive regimen is listed below:  Current Antihypertensives  metoprolol succinate (TOPROL-XL) 24 hr tablet 50 mg, Daily, Oral    Plan  - BP is controlled, no changes needed to their regimen  -

## 2025-03-12 NOTE — SUBJECTIVE & OBJECTIVE
Interval History:   Patient seen and examined.  NAD.  NAEO.  VSS.  IPR placement pending insurance authorization. Increased oxycodone to 10 mg every 3 hours as needed to wean from IV pain medicine, and started the patient on Relistor as he reports his GI recommends he take this instead of other laxatives.  Review of Systems   Constitutional:  Negative for activity change, appetite change, chills, fatigue and fever.   HENT:  Negative for congestion, facial swelling, hearing loss, rhinorrhea, sinus pressure and sinus pain.    Eyes:  Negative for photophobia and visual disturbance.   Respiratory:  Negative for cough, chest tightness, shortness of breath and wheezing.    Cardiovascular:  Negative for chest pain, palpitations and leg swelling.   Gastrointestinal:  Negative for abdominal distention, abdominal pain, constipation, diarrhea, nausea and vomiting.   Endocrine: Negative.    Genitourinary:  Negative for decreased urine volume, difficulty urinating, dysuria and urgency.   Musculoskeletal:  Positive for back pain. Negative for arthralgias, gait problem, joint swelling, myalgias, neck pain and neck stiffness.   Skin: Negative.    Allergic/Immunologic: Negative.    Neurological:  Negative for tremors, facial asymmetry, speech difficulty, weakness, light-headedness, numbness and headaches.   Hematological: Negative.    Psychiatric/Behavioral: Negative.       Objective:     Vital Signs (Most Recent):  Temp: 98.1 °F (36.7 °C) (03/12/25 0748)  Pulse: 63 (03/12/25 0748)  Resp: 16 (03/12/25 0916)  BP: (!) 143/62 (03/12/25 0906)  SpO2: 99 % (03/12/25 0748) Vital Signs (24h Range):  Temp:  [97.6 °F (36.4 °C)-98.2 °F (36.8 °C)] 98.1 °F (36.7 °C)  Pulse:  [61-74] 63  Resp:  [15-18] 16  SpO2:  [95 %-99 %] 99 %  BP: (111-146)/(55-64) 143/62     Weight: 132.9 kg (292 lb 15.9 oz)  Body mass index is 44.55 kg/m².    Intake/Output Summary (Last 24 hours) at 3/12/2025 1119  Last data filed at 3/12/2025 0854  Gross per 24 hour    Intake 600 ml   Output 950 ml   Net -350 ml         Physical Exam  Vitals and nursing note reviewed.   Constitutional:       Appearance: Normal appearance. He is well-developed.   HENT:      Head: Normocephalic and atraumatic.      Nose: Nose normal. No septal deviation.   Eyes:      Conjunctiva/sclera: Conjunctivae normal.      Pupils: Pupils are equal, round, and reactive to light.   Neck:      Thyroid: No thyroid mass.      Vascular: No JVD.      Trachea: No tracheal tenderness or tracheal deviation.   Cardiovascular:      Rate and Rhythm: Normal rate and regular rhythm.      Heart sounds: S1 normal and S2 normal. No murmur heard.     No friction rub. No gallop.   Pulmonary:      Effort: Pulmonary effort is normal.      Breath sounds: Normal breath sounds. No decreased breath sounds, wheezing, rhonchi or rales.   Abdominal:      General: Bowel sounds are normal. There is no distension.      Palpations: Abdomen is soft. There is no hepatomegaly, splenomegaly or mass.      Tenderness: There is no abdominal tenderness.   Musculoskeletal:      Thoracic back: Tenderness present. Decreased range of motion.   Skin:     General: Skin is warm.      Findings: No rash.   Neurological:      General: No focal deficit present.      Mental Status: He is alert and oriented to person, place, and time.      Cranial Nerves: No cranial nerve deficit.      Sensory: No sensory deficit.   Psychiatric:         Mood and Affect: Mood normal.         Behavior: Behavior normal.               Significant Labs: All pertinent labs within the past 24 hours have been reviewed.  CBC:   Recent Labs   Lab 03/11/25  0445 03/12/25  0437   WBC 8.18 9.75   HGB 7.9* 9.3*   HCT 25.9* 30.2*    228     CMP:   Recent Labs   Lab 03/11/25  0446 03/12/25  0437    135*   K 3.8 4.2    100   CO2 25 26   * 104   BUN 24* 18   CREATININE 1.0 0.9   CALCIUM 8.4* 8.8   PROT 5.8* 6.6   ALBUMIN 2.8* 3.1*   BILITOT 0.3 0.3   ALKPHOS 61 67   AST  10 13   ALT 13 14   ANIONGAP 9 9     Magnesium:   Recent Labs   Lab 03/11/25  0446 03/12/25  0437   MG 1.6 1.9     POCT Glucose:   Recent Labs   Lab 03/11/25  1711 03/11/25  2031 03/12/25  0736   POCTGLUCOSE 175* 154* 90     Lab Results   Component Value Date    CALCIUM 8.8 03/12/2025    PHOS 3.2 03/12/2025        Significant Imaging: I have reviewed all pertinent imaging results/findings within the past 24 hours.  Imaging Results    None

## 2025-03-12 NOTE — PLAN OF CARE
Problem: Adult Inpatient Plan of Care  Goal: Plan of Care Review  3/12/2025 1616 by Crystal Mcdonough RN  Outcome: Progressing  3/12/2025 1616 by Crystal Mcdonough RN  Outcome: Progressing  Goal: Patient-Specific Goal (Individualized)  3/12/2025 1616 by Crystal Mcdonough RN  Outcome: Progressing  3/12/2025 1616 by Crystal Mcdonough RN  Outcome: Progressing  Goal: Absence of Hospital-Acquired Illness or Injury  3/12/2025 1616 by Crystal Mcdonough RN  Outcome: Progressing  3/12/2025 1616 by Crystal Mcdonough RN  Outcome: Progressing  Goal: Optimal Comfort and Wellbeing  3/12/2025 1616 by Crystal Mcdonough RN  Outcome: Progressing  3/12/2025 1616 by Crystal Mcdonough RN  Outcome: Progressing  Goal: Readiness for Transition of Care  3/12/2025 1616 by Crystal Mcdonough RN  Outcome: Progressing  3/12/2025 1616 by Crystal Mcdonough RN  Outcome: Progressing     Problem: Bariatric Environmental Safety  Goal: Safety Maintained with Care  3/12/2025 1616 by Crystal Mcdonough RN  Outcome: Progressing  3/12/2025 1616 by Crystal Mcdonough RN  Outcome: Progressing     Problem: Fall Injury Risk  Goal: Absence of Fall and Fall-Related Injury  3/12/2025 1616 by Crystal Mcdonough RN  Outcome: Progressing  3/12/2025 1616 by Crystal Mcdonough RN  Outcome: Progressing    POC reviewed with patient. Verbalized understanding. Pain regimen adjusted to try and gain better control without the use of IV medicine. Methocarbamol increased and frequency of oxycodone increased. Patient up in chair with brace today and ambulating to BR without difficulty. No replacements given. No falls this shift. Plan is for  to find placement. No possibility of discharge today.

## 2025-03-12 NOTE — PLAN OF CARE
Problem: Adult Inpatient Plan of Care  Goal: Plan of Care Review  Outcome: Progressing  Goal: Patient-Specific Goal (Individualized)  Outcome: Progressing  Goal: Absence of Hospital-Acquired Illness or Injury  Outcome: Progressing  Goal: Optimal Comfort and Wellbeing  Outcome: Progressing  Goal: Readiness for Transition of Care  Outcome: Progressing     Problem: Bariatric Environmental Safety  Goal: Safety Maintained with Care  Outcome: Progressing     Problem: Skin Injury Risk Increased  Goal: Skin Health and Integrity  Outcome: Progressing     Problem: Diabetes Comorbidity  Goal: Blood Glucose Level Within Targeted Range  Outcome: Progressing     Problem: Fall Injury Risk  Goal: Absence of Fall and Fall-Related Injury  Outcome: Progressing

## 2025-03-12 NOTE — PLAN OF CARE
Problem: Occupational Therapy  Goal: Occupational Therapy Goal  Description: Goals to be met by: 4/10/2025     Patient will increase functional independence with ADLs by performing:    UE Dressing with Set-up Assistance.  LE Dressing with Modified Jennings and Assistive Devices as needed.  Grooming while standing at sink with Modified Jennings.  Toileting from toilet with Modified Jennings and Assistive Devices as needed for hygiene and clothing management.   Toilet transfer to toilet with Contact Guard Assistance.    Outcome: Progressing  Continue with POC

## 2025-03-12 NOTE — ASSESSMENT & PLAN NOTE
Patient's FSGs are controlled on current medication regimen.  Last A1c reviewed-   Lab Results   Component Value Date    HGBA1C 5.3 03/05/2025     Most recent fingerstick glucose reviewed-   Recent Labs   Lab 03/11/25  1711 03/11/25  2031 03/12/25  0736 03/12/25  1157   POCTGLUCOSE 175* 154* 90 112*     Current correctional scale  Medium  Maintain anti-hyperglycemic dose as follows-   Antihyperglycemics (From admission, onward)      Start     Stop Route Frequency Ordered    03/09/25 2325  insulin aspart U-100 pen 0-10 Units         -- SubQ Before meals & nightly PRN 03/09/25 2226          Hold Oral hypoglycemics while patient is in the hospital.
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
## 2025-03-12 NOTE — ASSESSMENT & PLAN NOTE
Admit to med/surg   Was discharged on 3/7 after admit for acute neurological deficit   Findings included worsening of his chronic thoracic vertebral fractures  He states that he was hoping for SNF placement as he is unable to care for himself due to his living conditions and chronic pain  Returned today for exacerbation of chronic pain, unable to be controlled with several doses of IV pain medication    Consult case management to discuss placement in a facility or home care  Scheduled Robaxin 500 mg q8h  Oxycodone IR 10 mg q.3 hours p.r.n. pain, IV Dilaudid p.r.n. however we will attempt to wean from IV pain medications this time  Accepted to [facility], pending insurance authorization

## 2025-03-12 NOTE — ASSESSMENT & PLAN NOTE
Anemia is likely due to chronic blood loss and chronic disease. Most recent hemoglobin and hematocrit are listed below.  Recent Labs     03/10/25  0433 03/11/25  0445 03/12/25  0437   HGB 8.3* 7.9* 9.3*   HCT 27.1* 25.9* 30.2*     Plan  - Monitor serial CBC: Daily  - Transfuse PRBC if patient becomes hemodynamically unstable, symptomatic or H/H drops below 7/21.  - Patient has not received any PRBC transfusions to date  - Patient's anemia is currently stable  -

## 2025-03-12 NOTE — NURSING
Pt's blood sugar was 195.  Pt refused insulin coverage.  Pt stated that it will resolve itself.  Will CTM.

## 2025-03-12 NOTE — PT/OT/SLP PROGRESS
Occupational Therapy   Treatment    Name: Jacoby Jean-Baptiste  MRN: [id number]  Admitting Diagnosis:  Intractable pain       Recommendations:     Discharge Recommendations: High Intensity Therapy  Discharge Equipment Recommendations:  other (see comments) (OT provided education in use of long bath sponge and reacher for lower body ADL's. Pt verbalized understanding)  Barriers to discharge:       Assessment:     Jacoby Jean-Baptiste is a 50 y.o. male with a medical diagnosis of Intractable pain.  He presents with the following performance deficits affecting function are weakness, impaired endurance, impaired self care skills, impaired functional mobility, gait instability, impaired balance, decreased lower extremity function, pain, decreased ROM, orthopedic precautions.     Rehab Prognosis:  Good; patient would benefit from acute skilled OT services to address these deficits and reach maximum level of function.       Plan:     Patient to be seen 6 x/week to address the above listed problems via self-care/home management, therapeutic activities, therapeutic exercises  Plan of Care Expires: 04/10/25  Plan of Care Reviewed with: patient    Subjective     Chief Complaint: pain  Patient/Family Comments/goals: To get better  Pain/Comfort:       Objective:     Communicated with: Nurse Zapien prior to session.  Patient found HOB elevated with peripheral IV upon OT entry to room.    General Precautions: Standard, fall    Orthopedic Precautions:spinal precautions  Braces:  (TLSO in room)  Respiratory Status: Room air     Occupational Performance:       Activities of Daily Living:  PT mentioned that pt wanted to wash off. Pt declined this with OT stating that he wanted to take a shower later this evening since that is when he usually does it. Pt states he had already discussed with nursing.  OT provided instruction in use of AE for lower body ADL's including use of reacher and long bath sponge. Pt declined instruction with sock aid,  stating he does not wear socks. Pt verbalized understanding.  OT provided instruction in home safety and spinal precautions with ADL's/IADL's including home set up and DME/AE. Pt verbalized understanding. OT recommended shower chair for increase safety with showering. Pt verbalized understanding. Pt has a raised toilet. OT provided education in use of rails for increased safety instead of pushing up on other things in the bathroom. Pt verbalized understanding.      [facility] [address] 6 Click ADL:      Treatment & Education:  OT provided education in role of OT. Patient verbalized understanding and participated in OT.  OT provided instruction in home safety and spinal precautions with ADL/IADL including review of home set up and DME/AE. Patient verbalized understanding.       Patient left HOB elevated with all lines intact, call button in reach, and bed alarm on    GOALS:   Multidisciplinary Problems       Occupational Therapy Goals          Problem: Occupational Therapy    Goal Priority Disciplines Outcome Interventions   Occupational Therapy Goal     OT, PT/OT Progressing    Description: Goals to be met by: 4/10/2025     Patient will increase functional independence with ADLs by performing:    UE Dressing with Set-up Assistance.  LE Dressing with Modified [place] and Assistive Devices as needed.  Grooming while standing at sink with Modified [place].  Toileting from toilet with Modified [place] and Assistive Devices as needed for hygiene and clothing management.   Toilet transfer to toilet with Contact Guard Assistance.                         DME Justifications:  No DME recommended requiring DME justifications    Time Tracking:     OT Date of Treatment: 03/12/25  OT Start Time: 1430  OT Stop Time: 1443  OT Total Time (min): 13 min    Billable Minutes:Self Care/Home Management 13    OT/JACKSON: OT          3/12/2025

## 2025-03-12 NOTE — PT/OT/SLP PROGRESS
"Physical Therapy Treatment    Patient Name:  Jacoby Jean-Baptiste   MRN:  [id number]    Recommendations:     Discharge Recommendations: High Intensity Therapy  Discharge Equipment Recommendations: none  Barriers to discharge:  spinal precautions, decreased mobility from baseline    Assessment:     Jacoby Jean-Baptiste is a 50 y.o. male admitted with a medical diagnosis of Intractable pain.  He presents with the following impairments/functional limitations: weakness, impaired endurance, impaired functional mobility, gait instability, decreased lower extremity function, pain, orthopedic precautions.    Pt increased gait distance to 50' x 2 with RW and Betsy. Declined TLSO use. No appropriate bedside chair in room for extended sitting, so pt returned to bed with SBA. Needs continued training on spinal prec adherence during bed mobility.     Rehab Prognosis: Good and Fair; patient would benefit from acute skilled PT services to address these deficits and reach maximum level of function.    Recent Surgery: * No surgery found *      Plan:     During this hospitalization, patient to be seen 6 x/week to address the identified rehab impairments via gait training, therapeutic activities, therapeutic exercises and progress toward the following goals:    Plan of Care Expires:  03/30/25    Subjective     Chief Complaint: pain  Patient/Family Comments/goals: "I would like to get a different brace that actually helps me and doesn't hurt my back worse"  Pain/Comfort:  Pain Rating 1:  (did not rate)  Location 1: back  Pain Addressed 1: Pre-medicate for activity, Reposition, Distraction      Objective:     Communicated with nurse prior to session.  Patient found HOB elevated with peripheral IV upon PT entry to room.     General Precautions: Standard, fall  Orthopedic Precautions: spinal precautions  Braces:  (TLSO at bedside, but pt declines to wear despite adjustments made ("It made me hurt worse when I tried it")  Respiratory Status: Room " air     Functional Mobility:  Bed Mobility:     Supine to Sit: stand by assistance  Sit to Supine: stand by assistance  Transfers:     Sit to Stand:  minimum assistance with rolling walker and from both EOB and from low chair in room  Gait: 50' x 2, RW, Betsy      AM-PAC 6 CLICK MOBILITY          Treatment & Education:    -Pt education: benefits of participation with therapy, OOB to chair during the day and for all meals as tolerated, staff assist for mobility, fall prevention, call light, review of discharge recommendation, spinal precaution adherence with mobility  -Increased gait distance with RW      Patient left HOB elevated with all lines intact, call button in reach, bed alarm on, and nurse notified..    GOALS:   Multidisciplinary Problems       Physical Therapy Goals          Problem: Physical Therapy    Goal Priority Disciplines Outcome Interventions   Physical Therapy Goal     PT, PT/OT Progressing    Description: Goals to be met by: 2025     Patient will increase functional independence with mobility by performin. Supine to sit with Modified [place]  2. Sit to stand transfer with Contact Guard Assistance  3. Bed to chair transfer with Contact Guard Assistance using Rolling Walker  4. Gait  x 50 feet with Minimal Assistance using Rolling Walker.   5. Lower extremity exercise program x20 reps                       Time Tracking:     PT Received On: 25  PT Start Time: 1348     PT Stop Time: 1405  PT Total Time (min): 17 min     Billable Minutes: Therapeutic Activity 17    Treatment Type: Treatment  PT/PTA: PTA     Number of PTA visits since last PT visit: 2025

## 2025-03-13 ENCOUNTER — DOCUMENT SCAN (OUTPATIENT)
Dept: HOME HEALTH SERVICES | Facility: HOSPITAL | Age: 50
End: 2025-03-13
Payer: COMMERCIAL

## 2025-03-13 PROBLEM — E66.01 MORBID OBESITY: Status: RESOLVED | Noted: 2025-03-05 | Resolved: 2025-03-13

## 2025-03-13 LAB
BASOPHILS # BLD AUTO: 0.01 K/UL (ref 0–0.2)
BASOPHILS NFR BLD: 0.1 % (ref 0–1.9)
DIFFERENTIAL METHOD BLD: ABNORMAL
EOSINOPHIL # BLD AUTO: 0 K/UL (ref 0–0.5)
EOSINOPHIL NFR BLD: 0.3 % (ref 0–8)
ERYTHROCYTE [DISTWIDTH] IN BLOOD BY AUTOMATED COUNT: 19.1 % (ref 11.5–14.5)
HCT VFR BLD AUTO: 29 % (ref 40–54)
HGB BLD-MCNC: 8.8 G/DL (ref 14–18)
IMM GRANULOCYTES # BLD AUTO: 0.08 K/UL (ref 0–0.04)
IMM GRANULOCYTES NFR BLD AUTO: 0.8 % (ref 0–0.5)
LYMPHOCYTES # BLD AUTO: 1.1 K/UL (ref 1–4.8)
LYMPHOCYTES NFR BLD: 10.8 % (ref 18–48)
MAGNESIUM SERPL-MCNC: 1.8 MG/DL (ref 1.6–2.6)
MCH RBC QN AUTO: 27.5 PG (ref 27–31)
MCHC RBC AUTO-ENTMCNC: 30.3 G/DL (ref 32–36)
MCV RBC AUTO: 91 FL (ref 82–98)
MONOCYTES # BLD AUTO: 0.8 K/UL (ref 0.3–1)
MONOCYTES NFR BLD: 8 % (ref 4–15)
NEUTROPHILS # BLD AUTO: 8.5 K/UL (ref 1.8–7.7)
NEUTROPHILS NFR BLD: 80 % (ref 38–73)
NRBC BLD-RTO: 0 /100 WBC
PHOSPHATE SERPL-MCNC: 4 MG/DL (ref 2.7–4.5)
PLATELET # BLD AUTO: 249 K/UL (ref 150–450)
PMV BLD AUTO: 10.9 FL (ref 9.2–12.9)
POCT GLUCOSE: 187 MG/DL (ref 70–110)
POCT GLUCOSE: 193 MG/DL (ref 70–110)
POCT GLUCOSE: 95 MG/DL (ref 70–110)
POCT GLUCOSE: 96 MG/DL (ref 70–110)
RBC # BLD AUTO: 3.2 M/UL (ref 4.6–6.2)
WBC # BLD AUTO: 10.56 K/UL (ref 3.9–12.7)

## 2025-03-13 PROCEDURE — 25000003 PHARM REV CODE 250: Performed by: NURSE PRACTITIONER

## 2025-03-13 PROCEDURE — 11000001 HC ACUTE MED/SURG PRIVATE ROOM

## 2025-03-13 PROCEDURE — 84100 ASSAY OF PHOSPHORUS: CPT | Performed by: NURSE PRACTITIONER

## 2025-03-13 PROCEDURE — 63600175 PHARM REV CODE 636 W HCPCS: Performed by: NURSE PRACTITIONER

## 2025-03-13 PROCEDURE — 83735 ASSAY OF MAGNESIUM: CPT | Performed by: NURSE PRACTITIONER

## 2025-03-13 PROCEDURE — 85025 COMPLETE CBC W/AUTO DIFF WBC: CPT | Performed by: NURSE PRACTITIONER

## 2025-03-13 PROCEDURE — 36415 COLL VENOUS BLD VENIPUNCTURE: CPT | Performed by: NURSE PRACTITIONER

## 2025-03-13 PROCEDURE — 63600175 PHARM REV CODE 636 W HCPCS: Performed by: EMERGENCY MEDICINE

## 2025-03-13 PROCEDURE — 97116 GAIT TRAINING THERAPY: CPT | Mod: CQ

## 2025-03-13 RX ORDER — PREDNISONE 20 MG/1
20 TABLET ORAL DAILY
Status: DISCONTINUED | OUTPATIENT
Start: 2025-03-18 | End: 2025-03-17 | Stop reason: HOSPADM

## 2025-03-13 RX ORDER — PREDNISONE 20 MG/1
40 TABLET ORAL DAILY
Status: COMPLETED | OUTPATIENT
Start: 2025-03-15 | End: 2025-03-17

## 2025-03-13 RX ORDER — PREDNISONE 5 MG/1
10 TABLET ORAL DAILY
Status: DISCONTINUED | OUTPATIENT
Start: 2025-03-21 | End: 2025-03-17 | Stop reason: HOSPADM

## 2025-03-13 RX ORDER — PREDNISONE 20 MG/1
60 TABLET ORAL DAILY
Status: DISPENSED | OUTPATIENT
Start: 2025-03-13 | End: 2025-03-15

## 2025-03-13 RX ADMIN — DIAZEPAM 5 MG: 5 TABLET ORAL at 11:03

## 2025-03-13 RX ADMIN — HYDROMORPHONE HYDROCHLORIDE 1 MG: 1 INJECTION, SOLUTION INTRAMUSCULAR; INTRAVENOUS; SUBCUTANEOUS at 09:03

## 2025-03-13 RX ADMIN — METHOCARBAMOL TABLETS 750 MG: 750 TABLET, COATED ORAL at 02:03

## 2025-03-13 RX ADMIN — ZOLPIDEM TARTRATE 10 MG: 5 TABLET ORAL at 09:03

## 2025-03-13 RX ADMIN — BUSPIRONE HYDROCHLORIDE 15 MG: 7.5 TABLET ORAL at 08:03

## 2025-03-13 RX ADMIN — ESCITALOPRAM OXALATE 20 MG: 10 TABLET, FILM COATED ORAL at 08:03

## 2025-03-13 RX ADMIN — HYDROMORPHONE HYDROCHLORIDE 1 MG: 1 INJECTION, SOLUTION INTRAMUSCULAR; INTRAVENOUS; SUBCUTANEOUS at 05:03

## 2025-03-13 RX ADMIN — PREGABALIN 50 MG: 25 CAPSULE ORAL at 08:03

## 2025-03-13 RX ADMIN — MELATONIN TAB 3 MG 9 MG: 3 TAB at 10:03

## 2025-03-13 RX ADMIN — ONDANSETRON 8 MG: 2 INJECTION INTRAMUSCULAR; INTRAVENOUS at 02:03

## 2025-03-13 RX ADMIN — METHOCARBAMOL TABLETS 750 MG: 750 TABLET, COATED ORAL at 08:03

## 2025-03-13 RX ADMIN — OXYCODONE HYDROCHLORIDE 15 MG: 10 TABLET ORAL at 02:03

## 2025-03-13 RX ADMIN — Medication 800 MG: at 01:03

## 2025-03-13 RX ADMIN — ENOXAPARIN SODIUM 40 MG: 40 INJECTION SUBCUTANEOUS at 09:03

## 2025-03-13 RX ADMIN — METHYLNALTREXONE BROMIDE 8 MG: 12 INJECTION, SOLUTION SUBCUTANEOUS at 08:03

## 2025-03-13 RX ADMIN — MELATONIN TAB 3 MG 9 MG: 3 TAB at 12:03

## 2025-03-13 RX ADMIN — PREDNISONE 60 MG: 20 TABLET ORAL at 08:03

## 2025-03-13 RX ADMIN — ATORVASTATIN CALCIUM 10 MG: 10 TABLET, FILM COATED ORAL at 08:03

## 2025-03-13 RX ADMIN — OXYCODONE HYDROCHLORIDE 15 MG: 10 TABLET ORAL at 10:03

## 2025-03-13 RX ADMIN — OXYCODONE HYDROCHLORIDE 15 MG: 10 TABLET ORAL at 06:03

## 2025-03-13 RX ADMIN — OXYCODONE HYDROCHLORIDE 10 MG: 10 TABLET ORAL at 12:03

## 2025-03-13 RX ADMIN — OXYCODONE HYDROCHLORIDE 10 MG: 10 TABLET ORAL at 07:03

## 2025-03-13 RX ADMIN — BUSPIRONE HYDROCHLORIDE 15 MG: 7.5 TABLET ORAL at 09:03

## 2025-03-13 RX ADMIN — PANTOPRAZOLE SODIUM 40 MG: 40 TABLET, DELAYED RELEASE ORAL at 08:03

## 2025-03-13 RX ADMIN — ENOXAPARIN SODIUM 40 MG: 40 INJECTION SUBCUTANEOUS at 08:03

## 2025-03-13 RX ADMIN — Medication 800 MG: at 09:03

## 2025-03-13 RX ADMIN — METOPROLOL SUCCINATE 50 MG: 50 TABLET, EXTENDED RELEASE ORAL at 08:03

## 2025-03-13 RX ADMIN — BUSPIRONE HYDROCHLORIDE 15 MG: 7.5 TABLET ORAL at 02:03

## 2025-03-13 NOTE — PLAN OF CARE
Problem: Adult Inpatient Plan of Care  Goal: Plan of Care Review  Outcome: Progressing  Goal: Patient-Specific Goal (Individualized)  Outcome: Progressing  Goal: Absence of Hospital-Acquired Illness or Injury  Outcome: Progressing  Goal: Optimal Comfort and Wellbeing  Outcome: Progressing  Goal: Readiness for Transition of Care  Outcome: Progressing     Problem: Bariatric Environmental Safety  Goal: Safety Maintained with Care  Outcome: Progressing     Problem: Skin Injury Risk Increased  Goal: Skin Health and Integrity  Outcome: Progressing     Problem: Diabetes Comorbidity  Goal: Blood Glucose Level Within Targeted Range  Outcome: Progressing     Problem: Fall Injury Risk  Goal: Absence of Fall and Fall-Related Injury  Outcome: Progressing     Plan of care reviewed with patient. Patient verbalized complete understanding. Two hour patient rounding maintained throughout shift. All fall precautions maintained. Bed in the lowest position, wheels locked, call light within reach. Side rails up x2. Slip resistant socks maintained. Needs attended to.

## 2025-03-13 NOTE — ASSESSMENT & PLAN NOTE
Anemia is likely due to chronic blood loss and chronic disease. Most recent hemoglobin and hematocrit are listed below.  Recent Labs     03/11/25  0445 03/12/25  0437   HGB 7.9* 9.3*   HCT 25.9* 30.2*     Plan  - Monitor serial CBC: Daily  - Transfuse PRBC if patient becomes hemodynamically unstable, symptomatic or H/H drops below 7/21.  - Patient has not received any PRBC transfusions to date  - Patient's anemia is currently stable  -
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
## 2025-03-13 NOTE — PT/OT/SLP PROGRESS
Occupational Therapy      Patient Name:  Jacoby Jean-Baptiste   MRN:  [id number]    Attempted OT tx. Patient refused due to fatigue. Will follow up later today if schedule permits.    3/13/2025

## 2025-03-13 NOTE — PLAN OF CARE
[facility]/Surg  Discharge Reassessment    Primary Care Provider: Hunter Aguilar III, MD    Expected Discharge Date: 3/14/2025    Reassessment (most recent)       Discharge Reassessment - 03/13/25 1223          Discharge Reassessment    Assessment Type Discharge Planning Reassessment     Did the patient's condition or plan change since previous assessment? No     Discharge Plan discussed with: Patient     Communicated GERARDO with patient/caregiver Yes     Discharge Plan A Rehab     Discharge Plan B Skilled Nursing Facility     DME Needed Upon Discharge  none     Why the patient remains in the hospital Requires continued medical care        Post-Acute Status    Post-Acute Authorization Placement     Post-Acute Placement Status Pending payor medical review/second level review                     Pending P2P for IPR

## 2025-03-13 NOTE — ASSESSMENT & PLAN NOTE
Patient's blood pressure range in the last 24 hours was: BP  Min: 102/52  Max: 150/63.The patient's inpatient anti-hypertensive regimen is listed below:  Current Antihypertensives  metoprolol succinate (TOPROL-XL) 24 hr tablet 50 mg, Daily, Oral    Plan  - BP is controlled, no changes needed to their regimen  -

## 2025-03-13 NOTE — PLAN OF CARE
Auth for [facility] still pending at this time. NP adjusting pain meds to assist with weaning off IV med       03/12/25 1300   Post-Acute Status   Post-Acute Authorization Placement   Post-Acute Placement Status Pending payor review/awaiting authorization (if required)

## 2025-03-13 NOTE — PT/OT/SLP PROGRESS
"Physical Therapy Treatment    Patient Name:  Jacoby Jean-Baptiste   MRN:  [id number]    Recommendations:     Discharge Recommendations: High Intensity Therapy  Discharge Equipment Recommendations: none  Barriers to discharge: None    Assessment:     Jacoby Jean-Baptiste is a 50 y.o. male admitted with a medical diagnosis of Intractable pain.  He presents with the following impairments/functional limitations: weakness, impaired endurance, gait instability, impaired functional mobility, decreased lower extremity function, orthopedic precautions . Required 2 attempts to participate. Requested to receive pain medication prior to ambulating.  Agreed at this time. Supine in bed , shorts only.    Declined use of TLSO as well as socks or shoes ( prefers barefoot , " I don't think I own a pair of socks.")  Sup > sit with  CGA , gown donned.  Sit > stand with rw Min A.  Ambulated 100' x 2 with rw , Min A, standing rest between each. Returned to room to bed with CGA.     Rehab Prognosis: Good; patient would benefit from acute skilled PT services to address these deficits and reach maximum level of function.    Recent Surgery: * No surgery found *      Plan:     During this hospitalization, patient to be seen 6 x/week to address the identified rehab impairments via gait training, therapeutic activities, therapeutic exercises and progress toward the following goals:    Plan of Care Expires:  03/30/25    Subjective     Chief Complaint: back pain   Patient/Family Comments/goals:  none stated  Pain/Comfort:  Pain Rating 1: other (see comments) (did not rate)  Location - Orientation 1: generalized  Location 1: back  Pain Addressed 1: Pre-medicate for activity, Reposition, Nurse notified      Objective:     Communicated with nurse Nan Reeves prior to session.  Patient found supine with   upon PT entry to room.     General Precautions: Standard, fall  Orthopedic Precautions: spinal precautions  Braces: TLSO (declines wearing)  Respiratory " Status: Room air     Functional Mobility:  Bed Mobility:     Supine to Sit: contact guard assistance  Sit to Supine: contact guard assistance  Transfers:     Sit to Stand:  minimum assistance with rolling walker  Gait: 100' x 2 with rw , Min A.       AM-PAC 6 CLICK MOBILITY          Treatment & Education:  Ambulated slowly with rw and Min A for safety.     Patient left supine with all lines intact, call button in reach, and nurse Kiet notified..    GOALS:   Multidisciplinary Problems       Physical Therapy Goals          Problem: Physical Therapy    Goal Priority Disciplines Outcome Interventions   Physical Therapy Goal     PT, PT/OT Progressing    Description: Goals to be met by: 2025     Patient will increase functional independence with mobility by performin. Supine to sit with Modified [place]  2. Sit to stand transfer with Contact Guard Assistance  3. Bed to chair transfer with Contact Guard Assistance using Rolling Walker  4. Gait  x 50 feet with Minimal Assistance using Rolling Walker.   5. Lower extremity exercise program x20 reps                       DME Justifications:  No DME recommended requiring DME justifications    Time Tracking:     PT Received On: 25  PT Start Time: 1037     PT Stop Time: 1050  PT Total Time (min): 13 min     Billable Minutes: Gait Training 13min    Treatment Type: Treatment  PT/PTA: PTA     Number of PTA visits since last PT visit: 2     2025

## 2025-03-13 NOTE — PLAN OF CARE
Problem: Physical Therapy  Goal: Physical Therapy Goal  Description: Goals to be met by: 2025     Patient will increase functional independence with mobility by performin. Supine to sit with Modified [place]  2. Sit to stand transfer with Contact Guard Assistance  3. Bed to chair transfer with Contact Guard Assistance using Rolling Walker  4. Gait  x 50 feet with Minimal Assistance using Rolling Walker.   5. Lower extremity exercise program x20 reps  Outcome: Progressing   Ambulate with rw and assistance for safety.

## 2025-03-13 NOTE — SUBJECTIVE & OBJECTIVE
Interval History:   Patient seen and examined.  NAD.  NAEO.  VSS.  IPR placement pending insurance authorization.  Increased Oxy IR 15 mg every 3 hours as needed, to assist with discontinuing IV pain medication in preparation for placement.  Discussed this at length with the patient and he understands.    We will adjust oral pain medication further if needed.    Review of Systems   Constitutional:  Negative for activity change, appetite change, chills, fatigue and fever.   HENT:  Negative for congestion, facial swelling, hearing loss, rhinorrhea, sinus pressure and sinus pain.    Eyes:  Negative for photophobia and visual disturbance.   Respiratory:  Negative for cough, chest tightness, shortness of breath and wheezing.    Cardiovascular:  Negative for chest pain, palpitations and leg swelling.   Gastrointestinal:  Negative for abdominal distention, abdominal pain, constipation, diarrhea, nausea and vomiting.   Endocrine: Negative.    Genitourinary:  Negative for decreased urine volume, difficulty urinating, dysuria and urgency.   Musculoskeletal:  Positive for back pain. Negative for arthralgias, gait problem, joint swelling, myalgias, neck pain and neck stiffness.   Skin: Negative.    Allergic/Immunologic: Negative.    Neurological:  Negative for tremors, facial asymmetry, speech difficulty, weakness, light-headedness, numbness and headaches.   Hematological: Negative.    Psychiatric/Behavioral: Negative.       Objective:     Vital Signs (Most Recent):  Temp: 97.9 °F (36.6 °C) (03/13/25 0730)  Pulse: 60 (03/13/25 0819)  Resp: 19 (03/13/25 0956)  BP: (!) 150/63 (03/13/25 0819)  SpO2: 99 % (03/13/25 0730) Vital Signs (24h Range):  Temp:  [97.7 °F (36.5 °C)-98.4 °F (36.9 °C)] 97.9 °F (36.6 °C)  Pulse:  [60-66] 60  Resp:  [16-19] 19  SpO2:  [95 %-99 %] 99 %  BP: (102-150)/(52-64) 150/63     Weight: 132.9 kg (292 lb 15.9 oz)  Body mass index is 44.55 kg/m².    Intake/Output Summary (Last 24 hours) at 3/13/2025  1101  Last data filed at 3/13/2025 0455  Gross per 24 hour   Intake 480 ml   Output 300 ml   Net 180 ml         Physical Exam  Vitals and nursing note reviewed.   Constitutional:       Appearance: Normal appearance. He is well-developed.   HENT:      Head: Normocephalic and atraumatic.      Nose: Nose normal. No septal deviation.   Eyes:      Conjunctiva/sclera: Conjunctivae normal.      Pupils: Pupils are equal, round, and reactive to light.   Neck:      Thyroid: No thyroid mass.      Vascular: No JVD.      Trachea: No tracheal tenderness or tracheal deviation.   Cardiovascular:      Rate and Rhythm: Normal rate and regular rhythm.      Heart sounds: S1 normal and S2 normal. No murmur heard.     No friction rub. No gallop.   Pulmonary:      Effort: Pulmonary effort is normal.      Breath sounds: Normal breath sounds. No decreased breath sounds, wheezing, rhonchi or rales.   Abdominal:      General: Bowel sounds are normal. There is no distension.      Palpations: Abdomen is soft. There is no hepatomegaly, splenomegaly or mass.      Tenderness: There is no abdominal tenderness.   Musculoskeletal:      Thoracic back: Tenderness present. Decreased range of motion.   Skin:     General: Skin is warm.      Findings: No rash.   Neurological:      General: No focal deficit present.      Mental Status: He is alert and oriented to person, place, and time.      Cranial Nerves: No cranial nerve deficit.      Sensory: No sensory deficit.   Psychiatric:         Mood and Affect: Mood normal.         Behavior: Behavior normal.               Significant Labs: All pertinent labs within the past 24 hours have been reviewed.  CBC:   Recent Labs   Lab 03/12/25  0437 03/13/25  0451   WBC 9.75 10.56   HGB 9.3* 8.8*   HCT 30.2* 29.0*    249     CMP:   Recent Labs   Lab 03/12/25  0437   *   K 4.2      CO2 26      BUN 18   CREATININE 0.9   CALCIUM 8.8   PROT 6.6   ALBUMIN 3.1*   BILITOT 0.3   ALKPHOS 67   AST 13    ALT 14   ANIONGAP 9     Magnesium:   Recent Labs   Lab 03/12/25  0437 03/13/25  0451   MG 1.9 1.8     POCT Glucose:   Recent Labs   Lab 03/12/25  1642 03/12/25  2153 03/13/25  0727   POCTGLUCOSE 195* 139* 95     Lab Results   Component Value Date    CALCIUM 8.8 03/12/2025    PHOS 4.0 03/13/2025        Significant Imaging: I have reviewed all pertinent imaging results/findings within the past 24 hours.  Imaging Results    None

## 2025-03-13 NOTE — ASSESSMENT & PLAN NOTE
Patient's FSGs are controlled on current medication regimen.  Last A1c reviewed-   Lab Results   Component Value Date    HGBA1C 5.3 03/05/2025     Most recent fingerstick glucose reviewed-   Recent Labs   Lab 03/12/25  1157 03/12/25  1642 03/12/25  2153 03/13/25  0727   POCTGLUCOSE 112* 195* 139* 95     Current correctional scale  Medium  Maintain anti-hyperglycemic dose as follows-   Antihyperglycemics (From admission, onward)      Start     Stop Route Frequency Ordered    03/09/25 2325  insulin aspart U-100 pen 0-10 Units         -- SubQ Before meals & nightly PRN 03/09/25 2226          Hold Oral hypoglycemics while patient is in the hospital.

## 2025-03-14 LAB
BASOPHILS # BLD AUTO: 0.03 K/UL (ref 0–0.2)
BASOPHILS NFR BLD: 0.2 % (ref 0–1.9)
DIFFERENTIAL METHOD BLD: ABNORMAL
EOSINOPHIL # BLD AUTO: 0 K/UL (ref 0–0.5)
EOSINOPHIL NFR BLD: 0.2 % (ref 0–8)
ERYTHROCYTE [DISTWIDTH] IN BLOOD BY AUTOMATED COUNT: 19 % (ref 11.5–14.5)
HCT VFR BLD AUTO: 33.3 % (ref 40–54)
HGB BLD-MCNC: 10 G/DL (ref 14–18)
IMM GRANULOCYTES # BLD AUTO: 0.15 K/UL (ref 0–0.04)
IMM GRANULOCYTES NFR BLD AUTO: 0.9 % (ref 0–0.5)
LYMPHOCYTES # BLD AUTO: 1.1 K/UL (ref 1–4.8)
LYMPHOCYTES NFR BLD: 6.7 % (ref 18–48)
MAGNESIUM SERPL-MCNC: 1.9 MG/DL (ref 1.6–2.6)
MCH RBC QN AUTO: 27.5 PG (ref 27–31)
MCHC RBC AUTO-ENTMCNC: 30 G/DL (ref 32–36)
MCV RBC AUTO: 92 FL (ref 82–98)
MONOCYTES # BLD AUTO: 1.3 K/UL (ref 0.3–1)
MONOCYTES NFR BLD: 7.8 % (ref 4–15)
NEUTROPHILS # BLD AUTO: 13.8 K/UL (ref 1.8–7.7)
NEUTROPHILS NFR BLD: 84.2 % (ref 38–73)
NRBC BLD-RTO: 0 /100 WBC
PHOSPHATE SERPL-MCNC: 3.6 MG/DL (ref 2.7–4.5)
PLATELET # BLD AUTO: 258 K/UL (ref 150–450)
PMV BLD AUTO: 10.6 FL (ref 9.2–12.9)
POCT GLUCOSE: 132 MG/DL (ref 70–110)
POCT GLUCOSE: 147 MG/DL (ref 70–110)
POCT GLUCOSE: 151 MG/DL (ref 70–110)
POCT GLUCOSE: 169 MG/DL (ref 70–110)
RBC # BLD AUTO: 3.64 M/UL (ref 4.6–6.2)
WBC # BLD AUTO: 16.36 K/UL (ref 3.9–12.7)

## 2025-03-14 PROCEDURE — 63600175 PHARM REV CODE 636 W HCPCS: Performed by: NURSE PRACTITIONER

## 2025-03-14 PROCEDURE — 85025 COMPLETE CBC W/AUTO DIFF WBC: CPT | Performed by: NURSE PRACTITIONER

## 2025-03-14 PROCEDURE — 11000001 HC ACUTE MED/SURG PRIVATE ROOM

## 2025-03-14 PROCEDURE — 25000003 PHARM REV CODE 250: Performed by: NURSE PRACTITIONER

## 2025-03-14 PROCEDURE — 63600175 PHARM REV CODE 636 W HCPCS: Performed by: EMERGENCY MEDICINE

## 2025-03-14 PROCEDURE — 36415 COLL VENOUS BLD VENIPUNCTURE: CPT | Performed by: NURSE PRACTITIONER

## 2025-03-14 PROCEDURE — 84100 ASSAY OF PHOSPHORUS: CPT | Performed by: NURSE PRACTITIONER

## 2025-03-14 PROCEDURE — 83735 ASSAY OF MAGNESIUM: CPT | Performed by: NURSE PRACTITIONER

## 2025-03-14 RX ADMIN — ONDANSETRON 8 MG: 2 INJECTION INTRAMUSCULAR; INTRAVENOUS at 01:03

## 2025-03-14 RX ADMIN — PREGABALIN 50 MG: 25 CAPSULE ORAL at 08:03

## 2025-03-14 RX ADMIN — ATORVASTATIN CALCIUM 10 MG: 10 TABLET, FILM COATED ORAL at 08:03

## 2025-03-14 RX ADMIN — ONDANSETRON 8 MG: 2 INJECTION INTRAMUSCULAR; INTRAVENOUS at 03:03

## 2025-03-14 RX ADMIN — DIAZEPAM 5 MG: 5 TABLET ORAL at 05:03

## 2025-03-14 RX ADMIN — OXYCODONE HYDROCHLORIDE 15 MG: 10 TABLET ORAL at 03:03

## 2025-03-14 RX ADMIN — BUSPIRONE HYDROCHLORIDE 15 MG: 7.5 TABLET ORAL at 08:03

## 2025-03-14 RX ADMIN — ENOXAPARIN SODIUM 40 MG: 40 INJECTION SUBCUTANEOUS at 08:03

## 2025-03-14 RX ADMIN — PREDNISONE 60 MG: 20 TABLET ORAL at 08:03

## 2025-03-14 RX ADMIN — PANTOPRAZOLE SODIUM 40 MG: 40 TABLET, DELAYED RELEASE ORAL at 08:03

## 2025-03-14 RX ADMIN — METHOCARBAMOL TABLETS 750 MG: 750 TABLET, COATED ORAL at 08:03

## 2025-03-14 RX ADMIN — ZOLPIDEM TARTRATE 10 MG: 5 TABLET ORAL at 08:03

## 2025-03-14 RX ADMIN — METHOCARBAMOL TABLETS 750 MG: 750 TABLET, COATED ORAL at 03:03

## 2025-03-14 RX ADMIN — ONDANSETRON 8 MG: 2 INJECTION INTRAMUSCULAR; INTRAVENOUS at 07:03

## 2025-03-14 RX ADMIN — ESCITALOPRAM OXALATE 20 MG: 10 TABLET, FILM COATED ORAL at 08:03

## 2025-03-14 RX ADMIN — OXYCODONE HYDROCHLORIDE 15 MG: 10 TABLET ORAL at 09:03

## 2025-03-14 RX ADMIN — OXYCODONE HYDROCHLORIDE 15 MG: 10 TABLET ORAL at 10:03

## 2025-03-14 RX ADMIN — MELATONIN TAB 3 MG 9 MG: 3 TAB at 08:03

## 2025-03-14 RX ADMIN — OXYCODONE HYDROCHLORIDE 15 MG: 10 TABLET ORAL at 07:03

## 2025-03-14 RX ADMIN — BUSPIRONE HYDROCHLORIDE 15 MG: 7.5 TABLET ORAL at 03:03

## 2025-03-14 NOTE — PT/OT/SLP PROGRESS
Occupational Therapy      Patient Name:  Jacoby Jean-Baptiste   MRN:  [id number]    Patient not seen today secondary to Patient unwilling to participate. Will follow-up when appropriate.    3/14/2025

## 2025-03-14 NOTE — ASSESSMENT & PLAN NOTE
Patient's FSGs are controlled on current medication regimen.  Last A1c reviewed-   Lab Results   Component Value Date    HGBA1C 5.3 03/05/2025     Most recent fingerstick glucose reviewed-   Recent Labs   Lab 03/13/25  1655 03/13/25  2031 03/14/25  0801 03/14/25  1131   POCTGLUCOSE 193* 187* 151* 147*     Current correctional scale  Medium  Maintain anti-hyperglycemic dose as follows-   Antihyperglycemics (From admission, onward)      Start     Stop Route Frequency Ordered    03/09/25 2325  insulin aspart U-100 pen 0-10 Units         -- SubQ Before meals & nightly PRN 03/09/25 2226          Hold Oral hypoglycemics while patient is in the hospital.

## 2025-03-14 NOTE — PT/OT/SLP PROGRESS
Physical Therapy      Patient Name:  Jacoby Jean-Baptiste   MRN:  [id number]    Patient not seen today secondary to Patient unwilling to participate (2 attempts: 09:40/ 11: 11, could not encourage, nurse Nan cruz.). Will follow-up 3/15/25.

## 2025-03-14 NOTE — ASSESSMENT & PLAN NOTE
Anemia is likely due to chronic blood loss and chronic disease. Most recent hemoglobin and hematocrit are listed below.  Recent Labs     03/12/25  0437 03/13/25  0451 03/14/25  0501   HGB 9.3* 8.8* 10.0*   HCT 30.2* 29.0* 33.3*     Plan  - Monitor serial CBC: Daily  - Transfuse PRBC if patient becomes hemodynamically unstable, symptomatic or H/H drops below 7/21.  - Patient has not received any PRBC transfusions to date  - Patient's anemia is currently stable  -

## 2025-03-14 NOTE — ASSESSMENT & PLAN NOTE
Noted, chronic  Currently on a steroid regimen from previous admit, will continue to taper  Spoke to patient's colorectal surgeon on 03/14/2025, she is requesting that patient go to rehab for PT and OT and follow up upon discharge from rehab for evaluation for colectomy.

## 2025-03-14 NOTE — SUBJECTIVE & OBJECTIVE
Interval History:   Patient seen and examined.  NAD.  NAEO.  VSS.  IPR placement pending insurance authorization.  Pain has been maintained off of IV pain medication.  Patient reports that he was told by his colorectal surgeon Dr. Shelton that we need to transfer him to [facility] for colon resection.    Personally spoke with Dr. Shelton is nurse who verified that Dr. hSelton wants him to go to rehab first for his back and following his rehab stay they will discuss colectomy.    Review of Systems   Constitutional:  Negative for activity change, appetite change, chills, fatigue and fever.   HENT:  Negative for congestion, facial swelling, hearing loss, rhinorrhea, sinus pressure and sinus pain.    Eyes:  Negative for photophobia and visual disturbance.   Respiratory:  Negative for cough, chest tightness, shortness of breath and wheezing.    Cardiovascular:  Negative for chest pain, palpitations and leg swelling.   Gastrointestinal:  Negative for abdominal distention, abdominal pain, constipation, diarrhea, nausea and vomiting.   Endocrine: Negative.    Genitourinary:  Negative for decreased urine volume, difficulty urinating, dysuria and urgency.   Musculoskeletal:  Positive for back pain. Negative for arthralgias, gait problem, joint swelling, myalgias, neck pain and neck stiffness.   Skin: Negative.    Allergic/Immunologic: Negative.    Neurological:  Negative for tremors, facial asymmetry, speech difficulty, weakness, light-headedness, numbness and headaches.   Hematological: Negative.    Psychiatric/Behavioral: Negative.       Objective:     Vital Signs (Most Recent):  Temp: 98 °F (36.7 °C) (03/14/25 1145)  Pulse: 78 (03/14/25 1145)  Resp: 18 (03/14/25 1145)  BP: 122/66 (03/14/25 1145)  SpO2: 98 % (03/14/25 1145) Vital Signs (24h Range):  Temp:  [97.7 °F (36.5 °C)-98.1 °F (36.7 °C)] 98 °F (36.7 °C)  Pulse:  [60-78] 78  Resp:  [14-18] 18  SpO2:  [93 %-98 %] 98 %  BP: (105-147)/(54-66) 122/66     Weight:  "132.9 kg (292 lb 15.9 oz)  Body mass index is 44.55 kg/m².  No intake or output data in the 24 hours ending 03/14/25 1429        Physical Exam  Vitals and nursing note reviewed.   Constitutional:       Appearance: Normal appearance. He is well-developed.   HENT:      Head: Normocephalic and atraumatic.      Nose: Nose normal. No septal deviation.   Eyes:      Conjunctiva/sclera: Conjunctivae normal.      Pupils: Pupils are equal, round, and reactive to light.   Neck:      Thyroid: No thyroid mass.      Vascular: No JVD.      Trachea: No tracheal tenderness or tracheal deviation.   Cardiovascular:      Rate and Rhythm: Normal rate and regular rhythm.      Heart sounds: S1 normal and S2 normal. No murmur heard.     No friction rub. No gallop.   Pulmonary:      Effort: Pulmonary effort is normal.      Breath sounds: Normal breath sounds. No decreased breath sounds, wheezing, rhonchi or rales.   Abdominal:      General: Bowel sounds are normal. There is no distension.      Palpations: Abdomen is soft. There is no hepatomegaly, splenomegaly or mass.      Tenderness: There is no abdominal tenderness.   Musculoskeletal:      Thoracic back: Tenderness present. Decreased range of motion.   Skin:     General: Skin is warm.      Findings: No rash.   Neurological:      General: No focal deficit present.      Mental Status: He is alert and oriented to person, place, and time.      Cranial Nerves: No cranial nerve deficit.      Sensory: No sensory deficit.   Psychiatric:         Mood and Affect: Mood normal.         Behavior: Behavior normal.               Significant Labs: All pertinent labs within the past 24 hours have been reviewed.  CBC:   Recent Labs   Lab 03/13/25  0451 03/14/25  0501   WBC 10.56 16.36*   HGB 8.8* 10.0*   HCT 29.0* 33.3*    258     CMP:   No results for input(s): "NA", "K", "CL", "CO2", "GLU", "BUN", "CREATININE", "CALCIUM", "PROT", "ALBUMIN", "BILITOT", "ALKPHOS", "AST", "ALT", "ANIONGAP", " ""EGFRNONAA" in the last 48 hours.    Invalid input(s): "ESTGFAFRICA"    Magnesium:   Recent Labs   Lab 03/13/25  0451 03/14/25  0501   MG 1.8 1.9     POCT Glucose:   Recent Labs   Lab 03/13/25  2031 03/14/25  0801 03/14/25  1131   POCTGLUCOSE 187* 151* 147*     Lab Results   Component Value Date    CALCIUM 8.8 03/12/2025    PHOS 3.6 03/14/2025        Significant Imaging: I have reviewed all pertinent imaging results/findings within the past 24 hours.  Imaging Results    None        "

## 2025-03-14 NOTE — ASSESSMENT & PLAN NOTE
Patient's blood pressure range in the last 24 hours was: BP  Min: 105/54  Max: 147/65.The patient's inpatient anti-hypertensive regimen is listed below:  Current Antihypertensives  metoprolol succinate (TOPROL-XL) 24 hr tablet 50 mg, Daily, Oral    Plan  - BP is controlled, no changes needed to their regimen  -
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
## 2025-03-14 NOTE — PLAN OF CARE
Problem: Adult Inpatient Plan of Care  Goal: Plan of Care Review  Outcome: Progressing  Goal: Patient-Specific Goal (Individualized)  Outcome: Progressing  Goal: Absence of Hospital-Acquired Illness or Injury  Outcome: Progressing  Goal: Optimal Comfort and Wellbeing  Outcome: Progressing  Goal: Readiness for Transition of Care  Outcome: Progressing     Problem: Bariatric Environmental Safety  Goal: Safety Maintained with Care  Outcome: Progressing     Problem: Skin Injury Risk Increased  Goal: Skin Health and Integrity  Outcome: Progressing     Problem: Diabetes Comorbidity  Goal: Blood Glucose Level Within Targeted Range  Outcome: Progressing     Problem: Fall Injury Risk  Goal: Absence of Fall and Fall-Related Injury  Outcome: Progressing     Plan of care reviewed with patient. Patient verbalized complete understanding. Two hour patient rounding maintained throughout shift. All fall precautions maintained. Bed in lowest position, wheels locked, call light within reach. Side rails up x2. Slip resistant socks maintained. Needs attended to.

## 2025-03-15 LAB
BASOPHILS # BLD AUTO: 0.02 K/UL (ref 0–0.2)
BASOPHILS NFR BLD: 0.2 % (ref 0–1.9)
CRP SERPL-MCNC: 25 MG/L (ref 0–8.2)
DIFFERENTIAL METHOD BLD: ABNORMAL
EOSINOPHIL # BLD AUTO: 0 K/UL (ref 0–0.5)
EOSINOPHIL NFR BLD: 0.3 % (ref 0–8)
ERYTHROCYTE [DISTWIDTH] IN BLOOD BY AUTOMATED COUNT: 18.8 % (ref 11.5–14.5)
ERYTHROCYTE [SEDIMENTATION RATE] IN BLOOD BY WESTERGREN METHOD: 5 MM/HR (ref 0–10)
HCT VFR BLD AUTO: 31.7 % (ref 40–54)
HGB BLD-MCNC: 9.8 G/DL (ref 14–18)
IMM GRANULOCYTES # BLD AUTO: 0.09 K/UL (ref 0–0.04)
IMM GRANULOCYTES NFR BLD AUTO: 1 % (ref 0–0.5)
LYMPHOCYTES # BLD AUTO: 1.2 K/UL (ref 1–4.8)
LYMPHOCYTES NFR BLD: 12.5 % (ref 18–48)
MAGNESIUM SERPL-MCNC: 1.9 MG/DL (ref 1.6–2.6)
MCH RBC QN AUTO: 27.7 PG (ref 27–31)
MCHC RBC AUTO-ENTMCNC: 30.9 G/DL (ref 32–36)
MCV RBC AUTO: 90 FL (ref 82–98)
MONOCYTES # BLD AUTO: 0.8 K/UL (ref 0.3–1)
MONOCYTES NFR BLD: 8.7 % (ref 4–15)
NEUTROPHILS # BLD AUTO: 7.2 K/UL (ref 1.8–7.7)
NEUTROPHILS NFR BLD: 77.3 % (ref 38–73)
NRBC BLD-RTO: 0 /100 WBC
PHOSPHATE SERPL-MCNC: 3.8 MG/DL (ref 2.7–4.5)
PLATELET # BLD AUTO: 279 K/UL (ref 150–450)
PMV BLD AUTO: 11.1 FL (ref 9.2–12.9)
POCT GLUCOSE: 102 MG/DL (ref 70–110)
POCT GLUCOSE: 104 MG/DL (ref 70–110)
POCT GLUCOSE: 113 MG/DL (ref 70–110)
POCT GLUCOSE: 92 MG/DL (ref 70–110)
RBC # BLD AUTO: 3.54 M/UL (ref 4.6–6.2)
WBC # BLD AUTO: 9.34 K/UL (ref 3.9–12.7)

## 2025-03-15 PROCEDURE — A9698 NON-RAD CONTRAST MATERIALNOC: HCPCS

## 2025-03-15 PROCEDURE — 25500020 PHARM REV CODE 255

## 2025-03-15 PROCEDURE — 86140 C-REACTIVE PROTEIN: CPT | Performed by: NURSE PRACTITIONER

## 2025-03-15 PROCEDURE — 83735 ASSAY OF MAGNESIUM: CPT | Performed by: NURSE PRACTITIONER

## 2025-03-15 PROCEDURE — 11000001 HC ACUTE MED/SURG PRIVATE ROOM

## 2025-03-15 PROCEDURE — 63600175 PHARM REV CODE 636 W HCPCS: Performed by: EMERGENCY MEDICINE

## 2025-03-15 PROCEDURE — 85025 COMPLETE CBC W/AUTO DIFF WBC: CPT | Performed by: NURSE PRACTITIONER

## 2025-03-15 PROCEDURE — 36415 COLL VENOUS BLD VENIPUNCTURE: CPT | Performed by: NURSE PRACTITIONER

## 2025-03-15 PROCEDURE — 63600175 PHARM REV CODE 636 W HCPCS: Performed by: NURSE PRACTITIONER

## 2025-03-15 PROCEDURE — 85651 RBC SED RATE NONAUTOMATED: CPT | Performed by: NURSE PRACTITIONER

## 2025-03-15 PROCEDURE — 25000003 PHARM REV CODE 250: Performed by: NURSE PRACTITIONER

## 2025-03-15 PROCEDURE — 84100 ASSAY OF PHOSPHORUS: CPT | Performed by: NURSE PRACTITIONER

## 2025-03-15 RX ADMIN — METHOCARBAMOL TABLETS 750 MG: 750 TABLET, COATED ORAL at 08:03

## 2025-03-15 RX ADMIN — ENOXAPARIN SODIUM 40 MG: 40 INJECTION SUBCUTANEOUS at 08:03

## 2025-03-15 RX ADMIN — BUSPIRONE HYDROCHLORIDE 15 MG: 7.5 TABLET ORAL at 08:03

## 2025-03-15 RX ADMIN — ONDANSETRON 8 MG: 2 INJECTION INTRAMUSCULAR; INTRAVENOUS at 12:03

## 2025-03-15 RX ADMIN — OXYCODONE HYDROCHLORIDE 15 MG: 10 TABLET ORAL at 12:03

## 2025-03-15 RX ADMIN — ONDANSETRON 8 MG: 2 INJECTION INTRAMUSCULAR; INTRAVENOUS at 06:03

## 2025-03-15 RX ADMIN — PANTOPRAZOLE SODIUM 40 MG: 40 TABLET, DELAYED RELEASE ORAL at 08:03

## 2025-03-15 RX ADMIN — ONDANSETRON 8 MG: 2 INJECTION INTRAMUSCULAR; INTRAVENOUS at 03:03

## 2025-03-15 RX ADMIN — PREDNISONE 40 MG: 20 TABLET ORAL at 08:03

## 2025-03-15 RX ADMIN — ZOLPIDEM TARTRATE 10 MG: 5 TABLET ORAL at 08:03

## 2025-03-15 RX ADMIN — METHOCARBAMOL TABLETS 750 MG: 750 TABLET, COATED ORAL at 03:03

## 2025-03-15 RX ADMIN — IOHEXOL 100 ML: 350 INJECTION, SOLUTION INTRAVENOUS at 02:03

## 2025-03-15 RX ADMIN — OXYCODONE HYDROCHLORIDE 15 MG: 10 TABLET ORAL at 05:03

## 2025-03-15 RX ADMIN — PREGABALIN 50 MG: 25 CAPSULE ORAL at 08:03

## 2025-03-15 RX ADMIN — OXYCODONE HYDROCHLORIDE 15 MG: 10 TABLET ORAL at 01:03

## 2025-03-15 RX ADMIN — OXYCODONE HYDROCHLORIDE 15 MG: 10 TABLET ORAL at 07:03

## 2025-03-15 RX ADMIN — ESCITALOPRAM OXALATE 20 MG: 10 TABLET, FILM COATED ORAL at 08:03

## 2025-03-15 RX ADMIN — METOPROLOL SUCCINATE 50 MG: 50 TABLET, EXTENDED RELEASE ORAL at 08:03

## 2025-03-15 RX ADMIN — OXYCODONE HYDROCHLORIDE 15 MG: 10 TABLET ORAL at 10:03

## 2025-03-15 RX ADMIN — OXYCODONE HYDROCHLORIDE 15 MG: 10 TABLET ORAL at 03:03

## 2025-03-15 RX ADMIN — ATORVASTATIN CALCIUM 10 MG: 10 TABLET, FILM COATED ORAL at 08:03

## 2025-03-15 RX ADMIN — MELATONIN TAB 3 MG 9 MG: 3 TAB at 08:03

## 2025-03-15 RX ADMIN — IOHEXOL 1000 ML: 9 SOLUTION ORAL at 02:03

## 2025-03-15 RX ADMIN — BUSPIRONE HYDROCHLORIDE 15 MG: 7.5 TABLET ORAL at 03:03

## 2025-03-15 RX ADMIN — OXYCODONE HYDROCHLORIDE 15 MG: 10 TABLET ORAL at 08:03

## 2025-03-15 NOTE — SUBJECTIVE & OBJECTIVE
Interval History:   Patient seen and examined.     He believes this morning that he is having a ulcerative colitis flare.  CT imaging ordered.    Discussed with nursing staff he is having loose stools but no thang blood noted to stool.    MiraLax and Relistor were discontinued      Review of Systems   Constitutional:  Negative for activity change, appetite change, chills, fatigue and fever.   HENT:  Negative for congestion, facial swelling, hearing loss, rhinorrhea, sinus pressure and sinus pain.    Eyes:  Negative for photophobia and visual disturbance.   Respiratory:  Negative for cough, chest tightness, shortness of breath and wheezing.    Cardiovascular:  Negative for chest pain, palpitations and leg swelling.   Gastrointestinal:  Positive for abdominal pain and diarrhea. Negative for abdominal distention, constipation, nausea and vomiting.   Endocrine: Negative.    Genitourinary:  Negative for decreased urine volume, difficulty urinating, dysuria and urgency.   Musculoskeletal:  Positive for back pain. Negative for arthralgias, gait problem, joint swelling, myalgias, neck pain and neck stiffness.   Skin: Negative.    Allergic/Immunologic: Negative.    Neurological:  Negative for tremors, facial asymmetry, speech difficulty, weakness, light-headedness, numbness and headaches.   Hematological: Negative.    Psychiatric/Behavioral: Negative.       Objective:     Vital Signs (Most Recent):  Temp: 98 °F (36.7 °C) (03/15/25 0709)  Pulse: 75 (03/15/25 0709)  Resp: 17 (03/15/25 0836)  BP: (!) 122/58 (03/15/25 0709)  SpO2: 95 % (03/15/25 0709) Vital Signs (24h Range):  Temp:  [97.9 °F (36.6 °C)-98.5 °F (36.9 °C)] 98 °F (36.7 °C)  Pulse:  [68-81] 75  Resp:  [14-19] 17  SpO2:  [91 %-98 %] 95 %  BP: (109-138)/(51-72) 122/58     Weight: 132.9 kg (292 lb 15.9 oz)  Body mass index is 44.55 kg/m².    Intake/Output Summary (Last 24 hours) at 3/15/2025 1058  Last data filed at 3/15/2025 0608  Gross per 24 hour   Intake 600 ml  "  Output --   Net 600 ml           Physical Exam  Vitals and nursing note reviewed.   Constitutional:       Appearance: Normal appearance. He is well-developed.   HENT:      Head: Normocephalic and atraumatic.      Nose: Nose normal. No septal deviation.   Eyes:      Conjunctiva/sclera: Conjunctivae normal.      Pupils: Pupils are equal, round, and reactive to light.   Neck:      Thyroid: No thyroid mass.      Vascular: No JVD.      Trachea: No tracheal tenderness or tracheal deviation.   Cardiovascular:      Rate and Rhythm: Normal rate and regular rhythm.      Heart sounds: S1 normal and S2 normal. No murmur heard.     No friction rub. No gallop.   Pulmonary:      Effort: Pulmonary effort is normal.      Breath sounds: Normal breath sounds. No decreased breath sounds, wheezing, rhonchi or rales.   Abdominal:      General: Bowel sounds are normal. There is no distension.      Palpations: Abdomen is soft. There is no hepatomegaly, splenomegaly or mass.      Tenderness: There is no abdominal tenderness.   Musculoskeletal:      Thoracic back: Tenderness present. Decreased range of motion.   Skin:     General: Skin is warm.      Findings: No rash.   Neurological:      General: No focal deficit present.      Mental Status: He is alert and oriented to person, place, and time.      Cranial Nerves: No cranial nerve deficit.      Sensory: No sensory deficit.   Psychiatric:         Mood and Affect: Mood normal.         Behavior: Behavior normal.               Significant Labs: All pertinent labs within the past 24 hours have been reviewed.  CBC:   Recent Labs   Lab 03/14/25  0501 03/15/25  0523   WBC 16.36* 9.34   HGB 10.0* 9.8*   HCT 33.3* 31.7*    279     CMP:   No results for input(s): "NA", "K", "CL", "CO2", "GLU", "BUN", "CREATININE", "CALCIUM", "PROT", "ALBUMIN", "BILITOT", "ALKPHOS", "AST", "ALT", "ANIONGAP", "EGFRNONAA" in the last 48 hours.    Invalid input(s): "ESTGFAFRICA"    Magnesium:   Recent Labs   Lab " 03/14/25  0501 03/15/25  0523   MG 1.9 1.9     POCT Glucose:   Recent Labs   Lab 03/14/25  1526 03/14/25 2053 03/15/25  0738   POCTGLUCOSE 169* 132* 102     Lab Results   Component Value Date    CALCIUM 8.8 03/12/2025    PHOS 3.8 03/15/2025        Significant Imaging: I have reviewed all pertinent imaging results/findings within the past 24 hours.  Imaging Results    None

## 2025-03-15 NOTE — ASSESSMENT & PLAN NOTE
Patient's FSGs are controlled on current medication regimen.  Last A1c reviewed-   Lab Results   Component Value Date    HGBA1C 5.3 03/05/2025     Most recent fingerstick glucose reviewed-   Recent Labs   Lab 03/14/25  1131 03/14/25  1526 03/14/25  2053 03/15/25  0738   POCTGLUCOSE 147* 169* 132* 102     Current correctional scale  Medium  Maintain anti-hyperglycemic dose as follows-   Antihyperglycemics (From admission, onward)      Start     Stop Route Frequency Ordered    03/09/25 2325  insulin aspart U-100 pen 0-10 Units         -- SubQ Before meals & nightly PRN 03/09/25 2226          Hold Oral hypoglycemics while patient is in the hospital.
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
## 2025-03-15 NOTE — ASSESSMENT & PLAN NOTE
Anemia is likely due to chronic blood loss and chronic disease. Most recent hemoglobin and hematocrit are listed below.  Recent Labs     03/13/25  0451 03/14/25  0501 03/15/25  0523   HGB 8.8* 10.0* 9.8*   HCT 29.0* 33.3* 31.7*     Plan  - Monitor serial CBC: Daily  - Transfuse PRBC if patient becomes hemodynamically unstable, symptomatic or H/H drops below 7/21.  - Patient has not received any PRBC transfusions to date  - Patient's anemia is currently stable  -

## 2025-03-15 NOTE — PT/OT/SLP PROGRESS
"Physical Therapy      Patient Name:  Jacoby Jean-Baptiste   MRN:  [id number]    Patient declined participation with PT "My UC is flaring up and I have a CT scan soon" Will follow-up next scheduled visit.    "

## 2025-03-15 NOTE — PT/OT/SLP PROGRESS
Occupational Therapy      Patient Name:  Jacoby Jean-Baptiste   MRN:  [id number]    Patient not seen today secondary to Other (Comment) (Pt drinking liquid for a test; Increased UC problems today/wants to rest). Will follow-up.    3/15/2025

## 2025-03-15 NOTE — ASSESSMENT & PLAN NOTE
Noted, chronic  Currently on a steroid regimen from previous admit, will continue to taper  Spoke to patient's colorectal surgeon on 03/14/2025, she is requesting that patient go to rehab for PT and OT and follow up upon discharge from rehab for evaluation for colectomy.    Patient is reporting abdominal pain and loose stools today  Order CT of abdomen and pelvis with IV contrast to evaluate for flare  Check CRP, sed rate and fecal calprotectin  Check stool PCR for pathogens however patient may be colonized with C diff as he has a significant history of this    We will reach out to patient's colorectal surgeon/GI specialist to see if they recommend transfer for evaluation by Colorectal surgery

## 2025-03-15 NOTE — ASSESSMENT & PLAN NOTE
Patient's blood pressure range in the last 24 hours was: BP  Min: 109/51  Max: 138/72.The patient's inpatient anti-hypertensive regimen is listed below:  Current Antihypertensives  metoprolol succinate (TOPROL-XL) 24 hr tablet 50 mg, Daily, Oral    Plan  - BP is controlled, no changes needed to their regimen  -

## 2025-03-15 NOTE — PLAN OF CARE
Problem: Adult Inpatient Plan of Care  Goal: Plan of Care Review  Outcome: Progressing  Goal: Patient-Specific Goal (Individualized)  Outcome: Progressing  Goal: Absence of Hospital-Acquired Illness or Injury  Outcome: Progressing  Goal: Optimal Comfort and Wellbeing  Outcome: Progressing  Goal: Readiness for Transition of Care  Outcome: Progressing     Problem: Bariatric Environmental Safety  Goal: Safety Maintained with Care  Outcome: Progressing     Problem: Skin Injury Risk Increased  Goal: Skin Health and Integrity  Outcome: Progressing     Problem: Diabetes Comorbidity  Goal: Blood Glucose Level Within Targeted Range  Outcome: Progressing     Problem: Fall Injury Risk  Goal: Absence of Fall and Fall-Related Injury  Outcome: Progressing       Patient is going for a CT scan per NP orders. Plan of care reviewed with patient. Patient verbalized complete understanding. Two hour patient rounding maintained throughout shift. All fall precautions maintained. Bed in lowest position, wheels locked, call light within reach. Side rails up x2. Slip resistant socks maintained. Needs attended to.

## 2025-03-16 LAB
ALBUMIN SERPL BCP-MCNC: 3 G/DL (ref 3.5–5.2)
ALP SERPL-CCNC: 69 U/L (ref 40–150)
ALT SERPL W/O P-5'-P-CCNC: 14 U/L (ref 10–44)
ANION GAP SERPL CALC-SCNC: 9 MMOL/L (ref 8–16)
AST SERPL-CCNC: 8 U/L (ref 10–40)
BASOPHILS # BLD AUTO: 0.01 K/UL (ref 0–0.2)
BASOPHILS NFR BLD: 0.1 % (ref 0–1.9)
BILIRUB SERPL-MCNC: 0.3 MG/DL (ref 0.1–1)
BUN SERPL-MCNC: 11 MG/DL (ref 6–20)
CALCIUM SERPL-MCNC: 8.5 MG/DL (ref 8.7–10.5)
CHLORIDE SERPL-SCNC: 101 MMOL/L (ref 95–110)
CO2 SERPL-SCNC: 28 MMOL/L (ref 23–29)
CREAT SERPL-MCNC: 1.1 MG/DL (ref 0.5–1.4)
DIFFERENTIAL METHOD BLD: ABNORMAL
EOSINOPHIL # BLD AUTO: 0.1 K/UL (ref 0–0.5)
EOSINOPHIL NFR BLD: 0.5 % (ref 0–8)
ERYTHROCYTE [DISTWIDTH] IN BLOOD BY AUTOMATED COUNT: 18.8 % (ref 11.5–14.5)
EST. GFR  (NO RACE VARIABLE): >60 ML/MIN/1.73 M^2
GLUCOSE SERPL-MCNC: 120 MG/DL (ref 70–110)
HCT VFR BLD AUTO: 28.6 % (ref 40–54)
HGB BLD-MCNC: 8.8 G/DL (ref 14–18)
IMM GRANULOCYTES # BLD AUTO: 0.06 K/UL (ref 0–0.04)
IMM GRANULOCYTES NFR BLD AUTO: 0.6 % (ref 0–0.5)
LYMPHOCYTES # BLD AUTO: 1.7 K/UL (ref 1–4.8)
LYMPHOCYTES NFR BLD: 17.1 % (ref 18–48)
MAGNESIUM SERPL-MCNC: 1.8 MG/DL (ref 1.6–2.6)
MCH RBC QN AUTO: 28 PG (ref 27–31)
MCHC RBC AUTO-ENTMCNC: 30.8 G/DL (ref 32–36)
MCV RBC AUTO: 91 FL (ref 82–98)
MONOCYTES # BLD AUTO: 1.2 K/UL (ref 0.3–1)
MONOCYTES NFR BLD: 12 % (ref 4–15)
NEUTROPHILS # BLD AUTO: 7 K/UL (ref 1.8–7.7)
NEUTROPHILS NFR BLD: 69.7 % (ref 38–73)
NRBC BLD-RTO: 0 /100 WBC
PHOSPHATE SERPL-MCNC: 4.6 MG/DL (ref 2.7–4.5)
PLATELET # BLD AUTO: 266 K/UL (ref 150–450)
PMV BLD AUTO: 10.8 FL (ref 9.2–12.9)
POCT GLUCOSE: 139 MG/DL (ref 70–110)
POTASSIUM SERPL-SCNC: 3.3 MMOL/L (ref 3.5–5.1)
PROT SERPL-MCNC: 5.8 G/DL (ref 6–8.4)
RBC # BLD AUTO: 3.14 M/UL (ref 4.6–6.2)
SODIUM SERPL-SCNC: 138 MMOL/L (ref 136–145)
WBC # BLD AUTO: 9.98 K/UL (ref 3.9–12.7)

## 2025-03-16 PROCEDURE — 84100 ASSAY OF PHOSPHORUS: CPT | Performed by: NURSE PRACTITIONER

## 2025-03-16 PROCEDURE — 87507 IADNA-DNA/RNA PROBE TQ 12-25: CPT | Performed by: NURSE PRACTITIONER

## 2025-03-16 PROCEDURE — 85025 COMPLETE CBC W/AUTO DIFF WBC: CPT | Performed by: NURSE PRACTITIONER

## 2025-03-16 PROCEDURE — 97530 THERAPEUTIC ACTIVITIES: CPT | Mod: CQ

## 2025-03-16 PROCEDURE — 25000003 PHARM REV CODE 250: Performed by: NURSE PRACTITIONER

## 2025-03-16 PROCEDURE — 63600175 PHARM REV CODE 636 W HCPCS: Performed by: EMERGENCY MEDICINE

## 2025-03-16 PROCEDURE — 80053 COMPREHEN METABOLIC PANEL: CPT | Performed by: NURSE PRACTITIONER

## 2025-03-16 PROCEDURE — 83993 ASSAY FOR CALPROTECTIN FECAL: CPT | Performed by: NURSE PRACTITIONER

## 2025-03-16 PROCEDURE — 63600175 PHARM REV CODE 636 W HCPCS: Performed by: NURSE PRACTITIONER

## 2025-03-16 PROCEDURE — 36415 COLL VENOUS BLD VENIPUNCTURE: CPT | Performed by: NURSE PRACTITIONER

## 2025-03-16 PROCEDURE — 83735 ASSAY OF MAGNESIUM: CPT | Performed by: NURSE PRACTITIONER

## 2025-03-16 PROCEDURE — 11000001 HC ACUTE MED/SURG PRIVATE ROOM

## 2025-03-16 PROCEDURE — 25000003 PHARM REV CODE 250

## 2025-03-16 RX ORDER — POTASSIUM CHLORIDE 14.9 MG/ML
20 INJECTION INTRAVENOUS
Status: DISCONTINUED | OUTPATIENT
Start: 2025-03-16 | End: 2025-03-16

## 2025-03-16 RX ORDER — POTASSIUM CHLORIDE 7.45 MG/ML
10 INJECTION INTRAVENOUS
Status: DISCONTINUED | OUTPATIENT
Start: 2025-03-16 | End: 2025-03-16

## 2025-03-16 RX ADMIN — POTASSIUM BICARBONATE 35 MEQ: 391 TABLET, EFFERVESCENT ORAL at 06:03

## 2025-03-16 RX ADMIN — DIAZEPAM 5 MG: 5 TABLET ORAL at 12:03

## 2025-03-16 RX ADMIN — PREDNISONE 40 MG: 20 TABLET ORAL at 08:03

## 2025-03-16 RX ADMIN — METHOCARBAMOL TABLETS 750 MG: 750 TABLET, COATED ORAL at 03:03

## 2025-03-16 RX ADMIN — OXYCODONE HYDROCHLORIDE 15 MG: 10 TABLET ORAL at 01:03

## 2025-03-16 RX ADMIN — Medication 800 MG: at 09:03

## 2025-03-16 RX ADMIN — MELATONIN TAB 3 MG 9 MG: 3 TAB at 09:03

## 2025-03-16 RX ADMIN — METHOCARBAMOL TABLETS 750 MG: 750 TABLET, COATED ORAL at 08:03

## 2025-03-16 RX ADMIN — BUSPIRONE HYDROCHLORIDE 15 MG: 7.5 TABLET ORAL at 03:03

## 2025-03-16 RX ADMIN — PREGABALIN 50 MG: 25 CAPSULE ORAL at 09:03

## 2025-03-16 RX ADMIN — PREGABALIN 50 MG: 25 CAPSULE ORAL at 08:03

## 2025-03-16 RX ADMIN — DIAZEPAM 5 MG: 5 TABLET ORAL at 02:03

## 2025-03-16 RX ADMIN — IBUPROFEN 600 MG: 600 TABLET ORAL at 08:03

## 2025-03-16 RX ADMIN — METHOCARBAMOL TABLETS 750 MG: 750 TABLET, COATED ORAL at 09:03

## 2025-03-16 RX ADMIN — OXYCODONE HYDROCHLORIDE 15 MG: 10 TABLET ORAL at 03:03

## 2025-03-16 RX ADMIN — POTASSIUM BICARBONATE 35 MEQ: 391 TABLET, EFFERVESCENT ORAL at 11:03

## 2025-03-16 RX ADMIN — PANTOPRAZOLE SODIUM 40 MG: 40 TABLET, DELAYED RELEASE ORAL at 08:03

## 2025-03-16 RX ADMIN — ATORVASTATIN CALCIUM 10 MG: 10 TABLET, FILM COATED ORAL at 08:03

## 2025-03-16 RX ADMIN — POLYETHYLENE GLYCOL (3350) 17 G: 17 POWDER, FOR SOLUTION ORAL at 09:03

## 2025-03-16 RX ADMIN — ONDANSETRON 8 MG: 2 INJECTION INTRAMUSCULAR; INTRAVENOUS at 08:03

## 2025-03-16 RX ADMIN — POTASSIUM BICARBONATE 35 MEQ: 391 TABLET, EFFERVESCENT ORAL at 09:03

## 2025-03-16 RX ADMIN — OXYCODONE HYDROCHLORIDE 15 MG: 10 TABLET ORAL at 11:03

## 2025-03-16 RX ADMIN — ENOXAPARIN SODIUM 40 MG: 40 INJECTION SUBCUTANEOUS at 09:03

## 2025-03-16 RX ADMIN — OXYCODONE HYDROCHLORIDE 15 MG: 10 TABLET ORAL at 10:03

## 2025-03-16 RX ADMIN — BUSPIRONE HYDROCHLORIDE 15 MG: 7.5 TABLET ORAL at 08:03

## 2025-03-16 RX ADMIN — ESCITALOPRAM OXALATE 20 MG: 10 TABLET, FILM COATED ORAL at 08:03

## 2025-03-16 RX ADMIN — OXYCODONE HYDROCHLORIDE 15 MG: 10 TABLET ORAL at 06:03

## 2025-03-16 RX ADMIN — PANTOPRAZOLE SODIUM 40 MG: 40 TABLET, DELAYED RELEASE ORAL at 09:03

## 2025-03-16 RX ADMIN — ZOLPIDEM TARTRATE 10 MG: 5 TABLET ORAL at 09:03

## 2025-03-16 RX ADMIN — OXYCODONE HYDROCHLORIDE 15 MG: 10 TABLET ORAL at 05:03

## 2025-03-16 RX ADMIN — ONDANSETRON 8 MG: 2 INJECTION INTRAMUSCULAR; INTRAVENOUS at 10:03

## 2025-03-16 RX ADMIN — Medication 800 MG: at 05:03

## 2025-03-16 RX ADMIN — BUSPIRONE HYDROCHLORIDE 15 MG: 7.5 TABLET ORAL at 09:03

## 2025-03-16 NOTE — PT/OT/SLP PROGRESS
"Physical Therapy Treatment    Patient Name:  Jacoby Jean-Baptiste   MRN:  [id number]    Recommendations:     Discharge Recommendations: High Intensity Therapy  Discharge Equipment Recommendations: none  Barriers to discharge:  medical management, spinal precautions, gait instability     Assessment:     Jacoby Jean-Baptiste is a 50 y.o. male admitted with a medical diagnosis of Intractable pain.  He presents with the following impairments/functional limitations: weakness, impaired endurance, gait instability, pain.    Pt presents up in chair with TLSO donned, and readily agreeable to ambulate gracia. Therapist assisted in minor readjustment of TLSO after pt stood from chair with SBA.     Pt declined use of RW but displayed instability throughout 90'x2, requiring Betsy, and with pt requesting to walk "close to the wall so I feel safer." Therapist advised pt that gait with RW support will reduce fall risk and should be continued until gait stability has improved.     Much improved participation this date. Returned to chair with good overall tolerance to activity, but continued complaints that TLSO increases his back pain.     Rehab Prognosis: Good and Fair; patient would benefit from acute skilled PT services to address these deficits and reach maximum level of function.    Recent Surgery: * No surgery found *      Plan:     During this hospitalization, patient to be seen 6 x/week to address the identified rehab impairments via gait training, therapeutic activities, therapeutic exercises, neuromuscular re-education and progress toward the following goals:    Plan of Care Expires:  03/30/25    Subjective     Chief Complaint: back pain; lack of breakfast  Patient/Family Comments/goals: "Can you find out why I never got a breakfast tray?" (Breakfast tray brought to pt by charge nurse at end of PT session; had been held due to earlier pt c/o nausea)  Pain/Comfort:  Pain Rating 1:  (did not rate)      Objective:     Communicated with " nurse prior to session.  Patient found up in chair with peripheral IV, telemetry upon PT entry to room.     General Precautions: Standard, fall  Orthopedic Precautions: spinal precautions  Braces: TLSO  Respiratory Status: Room air     Functional Mobility:  Transfers:     Sit to Stand:  stand by assistance with no AD   Gait: 90'x2, no AD, Betsy, and gait performed directly beside wall at pt's request due to instability; reduced coordination and accuracy of foot placement at heel strike noted bilaterally       AM-PAC 6 CLICK MOBILITY          Treatment & Education:    -Pt education: benefits of participation with therapy, OOB to chair during the day and for all meals as tolerated, staff assist for mobility, fall prevention, call light, review of discharge recommendation, benefits of TLSO, spinal precaution adherence      Patient left up in chair with all lines intact, call button in reach, and charge nurse entering room with breakfast tray ..    GOALS:   Multidisciplinary Problems       Physical Therapy Goals          Problem: Physical Therapy    Goal Priority Disciplines Outcome Interventions   Physical Therapy Goal     PT, PT/OT Progressing    Description: Goals to be met by: 2025     Patient will increase functional independence with mobility by performin. Supine to sit with Modified [place]  2. Sit to stand transfer with Contact Guard Assistance  3. Bed to chair transfer with Contact Guard Assistance using Rolling Walker  4. Gait  x 50 feet with Minimal Assistance using Rolling Walker.   5. Lower extremity exercise program x20 reps                       Time Tracking:     PT Received On: 25  PT Start Time: 925     PT Stop Time: 933  PT Total Time (min): 8 min     Billable Minutes: Therapeutic Activity 8    Treatment Type: Treatment  PT/PTA: PTA     Number of PTA visits since last PT visit: 3     2025

## 2025-03-16 NOTE — ASSESSMENT & PLAN NOTE
Anemia is likely due to chronic blood loss and chronic disease. Most recent hemoglobin and hematocrit are listed below.  Recent Labs     03/14/25  0501 03/15/25  0523 03/16/25  0413   HGB 10.0* 9.8* 8.8*   HCT 33.3* 31.7* 28.6*     Plan  - Monitor serial CBC: Daily  - Transfuse PRBC if patient becomes hemodynamically unstable, symptomatic or H/H drops below 7/21.  - Patient has not received any PRBC transfusions to date  - Patient's anemia is currently stable  -

## 2025-03-16 NOTE — ASSESSMENT & PLAN NOTE
Patient's FSGs are controlled on current medication regimen.  Last A1c reviewed-   Lab Results   Component Value Date    HGBA1C 5.3 03/05/2025     Most recent fingerstick glucose reviewed-   Recent Labs   Lab 03/15/25  1127 03/15/25  1625 03/15/25  2044   POCTGLUCOSE 113* 104 92     Current correctional scale  Medium  Maintain anti-hyperglycemic dose as follows-   Antihyperglycemics (From admission, onward)      Start     Stop Route Frequency Ordered    03/09/25 2325  insulin aspart U-100 pen 0-10 Units         -- SubQ Before meals & nightly PRN 03/09/25 2226          Hold Oral hypoglycemics while patient is in the hospital.

## 2025-03-16 NOTE — PT/OT/SLP PROGRESS
Occupational Therapy      Patient Name:  Jacoby Jean-Baptiste   MRN:  [id number]    Patient not seen today secondary to Patient unwilling to participate (pt stating he doesn't need OT. PATEL assured pt that he could discuss with OT at next session attempt.).     3/16/2025

## 2025-03-16 NOTE — SUBJECTIVE & OBJECTIVE
Interval History:   Patient seen and examined.     Still with left lower quadrant pain ad loose stool/. Afebrile     No leukocytosis, CRP 25.  ESR within normal limits.  Stool studies are pending collection.    CT of abdomen and pelvis with chronic versus acute changes consistent with colitis of the sigmoid colon.    At the patient's request transfer was initiated to [facility] for evaluation by his gastroenterologist and colorectal surgeon.    Review of Systems   Constitutional:  Negative for activity change, appetite change, chills, fatigue and fever.   HENT:  Negative for congestion, facial swelling, hearing loss, rhinorrhea, sinus pressure and sinus pain.    Eyes:  Negative for photophobia and visual disturbance.   Respiratory:  Negative for cough, chest tightness, shortness of breath and wheezing.    Cardiovascular:  Negative for chest pain, palpitations and leg swelling.   Gastrointestinal:  Positive for abdominal pain and diarrhea. Negative for abdominal distention, constipation, nausea and vomiting.   Endocrine: Negative.    Genitourinary:  Negative for decreased urine volume, difficulty urinating, dysuria and urgency.   Musculoskeletal:  Positive for back pain. Negative for arthralgias, gait problem, joint swelling, myalgias, neck pain and neck stiffness.   Skin: Negative.    Allergic/Immunologic: Negative.    Neurological:  Negative for tremors, facial asymmetry, speech difficulty, weakness, light-headedness, numbness and headaches.   Hematological: Negative.    Psychiatric/Behavioral: Negative.       Objective:     Vital Signs (Most Recent):  Temp: 97.8 °F (36.6 °C) (03/16/25 0858)  Pulse: 65 (03/16/25 0859)  Resp: 16 (03/16/25 1119)  BP: (!) 102/55 (03/16/25 0859)  SpO2: (!) 90 % (03/16/25 0737) Vital Signs (24h Range):  Temp:  [97.7 °F (36.5 °C)-98.2 °F (36.8 °C)] 97.8 °F (36.6 °C)  Pulse:  [63-68] 65  Resp:  [16-18] 16  SpO2:  [90 %-98 %] 90 %  BP: (100-127)/(55-58) 102/55      Weight: 132.9 kg (292 lb 15.9 oz)  Body mass index is 44.55 kg/m².    Intake/Output Summary (Last 24 hours) at 3/16/2025 1123  Last data filed at 3/16/2025 0339  Gross per 24 hour   Intake 240 ml   Output --   Net 240 ml           Physical Exam  Vitals and nursing note reviewed.   Constitutional:       Appearance: Normal appearance. He is well-developed.   HENT:      Head: Normocephalic and atraumatic.      Nose: Nose normal. No septal deviation.   Eyes:      Conjunctiva/sclera: Conjunctivae normal.      Pupils: Pupils are equal, round, and reactive to light.   Neck:      Thyroid: No thyroid mass.      Vascular: No JVD.      Trachea: No tracheal tenderness or tracheal deviation.   Cardiovascular:      Rate and Rhythm: Normal rate and regular rhythm.      Heart sounds: S1 normal and S2 normal. No murmur heard.     No friction rub. No gallop.   Pulmonary:      Effort: Pulmonary effort is normal.      Breath sounds: Normal breath sounds. No decreased breath sounds, wheezing, rhonchi or rales.   Abdominal:      General: Bowel sounds are normal. There is no distension.      Palpations: Abdomen is soft. There is no hepatomegaly, splenomegaly or mass.      Tenderness: There is no abdominal tenderness.   Musculoskeletal:      Thoracic back: Tenderness present. Decreased range of motion.   Skin:     General: Skin is warm.      Findings: No rash.   Neurological:      General: No focal deficit present.      Mental Status: He is alert and oriented to person, place, and time.      Cranial Nerves: No cranial nerve deficit.      Sensory: No sensory deficit.   Psychiatric:         Mood and Affect: Mood normal.         Behavior: Behavior normal.               Significant Labs: All pertinent labs within the past 24 hours have been reviewed.  CBC:   Recent Labs   Lab 03/15/25  0523 03/16/25  0413   WBC 9.34 9.98   HGB 9.8* 8.8*   HCT 31.7* 28.6*    266     CMP:   Recent Labs   Lab 03/16/25 0413      K 3.3*       CO2 28   *   BUN 11   CREATININE 1.1   CALCIUM 8.5*   PROT 5.8*   ALBUMIN 3.0*   BILITOT 0.3   ALKPHOS 69   AST 8*   ALT 14   ANIONGAP 9       Magnesium:   Recent Labs   Lab 03/15/25  0523 03/16/25  0413   MG 1.9 1.8     POCT Glucose:   Recent Labs   Lab 03/15/25  1127 03/15/25  1625 03/15/25  2044   POCTGLUCOSE 113* 104 92     Lab Results   Component Value Date    CALCIUM 8.5 (L) 03/16/2025    PHOS 4.6 (H) 03/16/2025        Significant Imaging: I have reviewed all pertinent imaging results/findings within the past 24 hours.  Imaging Results    None

## 2025-03-16 NOTE — PLAN OF CARE
Plan of care reviewed with patient. Patient verbalized complete understanding. Two hour patient rounding maintained throughout shift. All fall precautions maintained. Bed in lowest position, locked, call light in reach. Side rails up x 2. Slip resistant socks maintained. Needs attended to.     Problem: Adult Inpatient Plan of Care  Goal: Plan of Care Review  Outcome: Progressing  Goal: Patient-Specific Goal (Individualized)  Outcome: Progressing  Goal: Absence of Hospital-Acquired Illness or Injury  Outcome: Progressing  Goal: Optimal Comfort and Wellbeing  Outcome: Progressing  Goal: Readiness for Transition of Care  Outcome: Progressing     Problem: Bariatric Environmental Safety  Goal: Safety Maintained with Care  Outcome: Progressing     Problem: Skin Injury Risk Increased  Goal: Skin Health and Integrity  Outcome: Progressing     Problem: Diabetes Comorbidity  Goal: Blood Glucose Level Within Targeted Range  Outcome: Progressing     Problem: Fall Injury Risk  Goal: Absence of Fall and Fall-Related Injury  Outcome: Progressing

## 2025-03-16 NOTE — ASSESSMENT & PLAN NOTE
Noted, chronic  Currently on a steroid regimen from previous admit, will continue to taper  Spoke to patient's colorectal surgeon on 03/14/2025, she is requesting that patient go to rehab for PT and OT and follow up upon discharge from rehab for evaluation for colectomy.    Patient is reporting abdominal pain and loose stools today  Order CT of abdomen and pelvis with IV contrast with findings consistent with colitis versus chronic inflammation sigmoid colon  Check CRP, sed rate and fecal calprotectin  Check stool PCR for pathogens however patient may be colonized with C diff as he has a significant history of this  Also holding off on antibiotics at this time given significant history of C difficile as he is afebrile with normal white count    Transfer initiated to [facility] for evaluation by patient's gastroenterologist Dr. Beal and colorectal surgeon Dr. Church

## 2025-03-16 NOTE — ASSESSMENT & PLAN NOTE
Patient's blood pressure range in the last 24 hours was: BP  Min: 100/58  Max: 127/56.The patient's inpatient anti-hypertensive regimen is listed below:  Current Antihypertensives  metoprolol succinate (TOPROL-XL) 24 hr tablet 50 mg, Daily, Oral    Plan  - BP is controlled, no changes needed to their regimen  -
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
## 2025-03-17 VITALS
TEMPERATURE: 98 F | DIASTOLIC BLOOD PRESSURE: 55 MMHG | RESPIRATION RATE: 16 BRPM | BODY MASS INDEX: 44.41 KG/M2 | SYSTOLIC BLOOD PRESSURE: 109 MMHG | HEART RATE: 71 BPM | HEIGHT: 68 IN | OXYGEN SATURATION: 97 % | WEIGHT: 293 LBS

## 2025-03-17 LAB
ALBUMIN SERPL BCP-MCNC: 3.1 G/DL (ref 3.5–5.2)
ALP SERPL-CCNC: 75 U/L (ref 40–150)
ALT SERPL W/O P-5'-P-CCNC: 18 U/L (ref 10–44)
ANION GAP SERPL CALC-SCNC: 9 MMOL/L (ref 8–16)
AST SERPL-CCNC: 8 U/L (ref 10–40)
ASTROVIRUS: NOT DETECTED
BASOPHILS # BLD AUTO: 0.01 K/UL (ref 0–0.2)
BASOPHILS NFR BLD: 0.1 % (ref 0–1.9)
BILIRUB SERPL-MCNC: 0.3 MG/DL (ref 0.1–1)
BUN SERPL-MCNC: 19 MG/DL (ref 6–20)
C COLI+JEJ+UPSA DNA STL QL NAA+NON-PROBE: NOT DETECTED
CALCIUM SERPL-MCNC: 8.7 MG/DL (ref 8.7–10.5)
CHLORIDE SERPL-SCNC: 99 MMOL/L (ref 95–110)
CO2 SERPL-SCNC: 29 MMOL/L (ref 23–29)
CREAT SERPL-MCNC: 1.3 MG/DL (ref 0.5–1.4)
CYCLOSPORA CAYETANENSIS: NOT DETECTED
DIFFERENTIAL METHOD BLD: ABNORMAL
ENTEROAGGREGATIVE E COLI: NOT DETECTED
ENTEROPATHOGENIC E COLI: NOT DETECTED
EOSINOPHIL # BLD AUTO: 0.1 K/UL (ref 0–0.5)
EOSINOPHIL NFR BLD: 0.5 % (ref 0–8)
ERYTHROCYTE [DISTWIDTH] IN BLOOD BY AUTOMATED COUNT: 18.9 % (ref 11.5–14.5)
EST. GFR  (NO RACE VARIABLE): >60 ML/MIN/1.73 M^2
GLUCOSE SERPL-MCNC: 128 MG/DL (ref 70–110)
GPP - ADENOVIRUS 40/41: NOT DETECTED
GPP - CRYPTOSPORIDIUM: NOT DETECTED
GPP - ENTAMOEBA HISTOLYTICA: NOT DETECTED
GPP - ENTEROTOXIGENIC E COLI (ETEC): NOT DETECTED
GPP - GIARDIA LAMBLIA: NOT DETECTED
GPP - NOROVIRUS GI/GII: NOT DETECTED
GPP - ROTAVIRUS A: NOT DETECTED
GPP - SALMONELLA: NOT DETECTED
GPP - VIBRIO CHOLERA: NOT DETECTED
GPP - YERSINIA ENTEROCOLITICA: NOT DETECTED
HCT VFR BLD AUTO: 30.2 % (ref 40–54)
HGB BLD-MCNC: 9.2 G/DL (ref 14–18)
IMM GRANULOCYTES # BLD AUTO: 0.11 K/UL (ref 0–0.04)
IMM GRANULOCYTES NFR BLD AUTO: 0.9 % (ref 0–0.5)
LACTATE PLASV-SCNC: NOT DETECTED MMOL/L
LYMPHOCYTES # BLD AUTO: 1.7 K/UL (ref 1–4.8)
LYMPHOCYTES NFR BLD: 13.6 % (ref 18–48)
MAGNESIUM SERPL-MCNC: 1.9 MG/DL (ref 1.6–2.6)
MCH RBC QN AUTO: 27.3 PG (ref 27–31)
MCHC RBC AUTO-ENTMCNC: 30.5 G/DL (ref 32–36)
MCV RBC AUTO: 90 FL (ref 82–98)
MONOCYTES # BLD AUTO: 1.5 K/UL (ref 0.3–1)
MONOCYTES NFR BLD: 12.1 % (ref 4–15)
NEUTROPHILS # BLD AUTO: 8.8 K/UL (ref 1.8–7.7)
NEUTROPHILS NFR BLD: 72.8 % (ref 38–73)
NRBC BLD-RTO: 0 /100 WBC
PHOSPHATE SERPL-MCNC: 4.2 MG/DL (ref 2.7–4.5)
PLATELET # BLD AUTO: 270 K/UL (ref 150–450)
PLESIOMONAS SHIGELLOIDES: NOT DETECTED
PMV BLD AUTO: 10.2 FL (ref 9.2–12.9)
POCT GLUCOSE: 106 MG/DL (ref 70–110)
POCT GLUCOSE: 151 MG/DL (ref 70–110)
POCT GLUCOSE: 178 MG/DL (ref 70–110)
POTASSIUM SERPL-SCNC: 4 MMOL/L (ref 3.5–5.1)
PROT SERPL-MCNC: 6.1 G/DL (ref 6–8.4)
RBC # BLD AUTO: 3.37 M/UL (ref 4.6–6.2)
SAPOVIRUS: NOT DETECTED
SHIGELLA SP+EIEC IPAH STL QL NAA+PROBE: NOT DETECTED
SODIUM SERPL-SCNC: 137 MMOL/L (ref 136–145)
VIBRIO: NOT DETECTED
WBC # BLD AUTO: 12.14 K/UL (ref 3.9–12.7)

## 2025-03-17 PROCEDURE — 80053 COMPREHEN METABOLIC PANEL: CPT | Performed by: NURSE PRACTITIONER

## 2025-03-17 PROCEDURE — 25000003 PHARM REV CODE 250

## 2025-03-17 PROCEDURE — 63600175 PHARM REV CODE 636 W HCPCS: Performed by: NURSE PRACTITIONER

## 2025-03-17 PROCEDURE — 97116 GAIT TRAINING THERAPY: CPT | Mod: CQ

## 2025-03-17 PROCEDURE — 25000003 PHARM REV CODE 250: Performed by: NURSE PRACTITIONER

## 2025-03-17 PROCEDURE — 63600175 PHARM REV CODE 636 W HCPCS: Performed by: EMERGENCY MEDICINE

## 2025-03-17 PROCEDURE — 84100 ASSAY OF PHOSPHORUS: CPT | Performed by: NURSE PRACTITIONER

## 2025-03-17 PROCEDURE — 85025 COMPLETE CBC W/AUTO DIFF WBC: CPT | Performed by: NURSE PRACTITIONER

## 2025-03-17 PROCEDURE — 83735 ASSAY OF MAGNESIUM: CPT | Performed by: NURSE PRACTITIONER

## 2025-03-17 RX ORDER — PREDNISONE 20 MG/1
TABLET ORAL
Qty: 5 TABLET | Refills: 0 | Status: SHIPPED | OUTPATIENT
Start: 2025-03-18 | End: 2025-03-24

## 2025-03-17 RX ORDER — OXYCODONE HYDROCHLORIDE 15 MG/1
15 TABLET ORAL EVERY 6 HOURS PRN
Qty: 20 TABLET | Refills: 0 | Status: SHIPPED | OUTPATIENT
Start: 2025-03-17

## 2025-03-17 RX ORDER — METHOCARBAMOL 750 MG/1
750 TABLET, FILM COATED ORAL 4 TIMES DAILY PRN
Qty: 40 TABLET | Refills: 0 | Status: SHIPPED | OUTPATIENT
Start: 2025-03-17 | End: 2025-03-27

## 2025-03-17 RX ADMIN — OXYCODONE HYDROCHLORIDE 15 MG: 10 TABLET ORAL at 02:03

## 2025-03-17 RX ADMIN — METHYLNALTREXONE BROMIDE 8 MG: 12 INJECTION, SOLUTION SUBCUTANEOUS at 09:03

## 2025-03-17 RX ADMIN — OXYCODONE HYDROCHLORIDE 15 MG: 10 TABLET ORAL at 09:03

## 2025-03-17 RX ADMIN — IBUPROFEN 600 MG: 600 TABLET ORAL at 02:03

## 2025-03-17 RX ADMIN — OXYCODONE HYDROCHLORIDE 15 MG: 10 TABLET ORAL at 12:03

## 2025-03-17 RX ADMIN — METHOCARBAMOL TABLETS 750 MG: 750 TABLET, COATED ORAL at 09:03

## 2025-03-17 RX ADMIN — BUSPIRONE HYDROCHLORIDE 15 MG: 7.5 TABLET ORAL at 02:03

## 2025-03-17 RX ADMIN — PANTOPRAZOLE SODIUM 40 MG: 40 TABLET, DELAYED RELEASE ORAL at 09:03

## 2025-03-17 RX ADMIN — ENOXAPARIN SODIUM 40 MG: 40 INJECTION SUBCUTANEOUS at 09:03

## 2025-03-17 RX ADMIN — OXYCODONE HYDROCHLORIDE 15 MG: 10 TABLET ORAL at 05:03

## 2025-03-17 RX ADMIN — ESCITALOPRAM OXALATE 20 MG: 10 TABLET, FILM COATED ORAL at 09:03

## 2025-03-17 RX ADMIN — BUSPIRONE HYDROCHLORIDE 15 MG: 7.5 TABLET ORAL at 09:03

## 2025-03-17 RX ADMIN — PREDNISONE 40 MG: 20 TABLET ORAL at 09:03

## 2025-03-17 RX ADMIN — METHOCARBAMOL TABLETS 750 MG: 750 TABLET, COATED ORAL at 02:03

## 2025-03-17 RX ADMIN — PREGABALIN 50 MG: 25 CAPSULE ORAL at 09:03

## 2025-03-17 RX ADMIN — ONDANSETRON 8 MG: 2 INJECTION INTRAMUSCULAR; INTRAVENOUS at 12:03

## 2025-03-17 RX ADMIN — ONDANSETRON 8 MG: 2 INJECTION INTRAMUSCULAR; INTRAVENOUS at 05:03

## 2025-03-17 RX ADMIN — IBUPROFEN 600 MG: 600 TABLET ORAL at 09:03

## 2025-03-17 RX ADMIN — ATORVASTATIN CALCIUM 10 MG: 10 TABLET, FILM COATED ORAL at 09:03

## 2025-03-17 RX ADMIN — METOPROLOL SUCCINATE 50 MG: 50 TABLET, EXTENDED RELEASE ORAL at 09:03

## 2025-03-17 NOTE — PLAN OF CARE
POC reviewed with pt. Pt verbalized understanding.    All fall precautions maintained. Frequent checks performed per protocol. Bed in lowest position, call light within reach, slip resistant socks maintained. Bed alarm on/functioning. POC ongoing.         Problem: Adult Inpatient Plan of Care  Goal: Plan of Care Review  Outcome: Progressing  Goal: Patient-Specific Goal (Individualized)  Outcome: Progressing  Goal: Absence of Hospital-Acquired Illness or Injury  Outcome: Progressing  Goal: Optimal Comfort and Wellbeing  Outcome: Progressing  Goal: Readiness for Transition of Care  Outcome: Progressing     Problem: Bariatric Environmental Safety  Goal: Safety Maintained with Care  Outcome: Progressing     Problem: Skin Injury Risk Increased  Goal: Skin Health and Integrity  Outcome: Progressing     Problem: Diabetes Comorbidity  Goal: Blood Glucose Level Within Targeted Range  Outcome: Progressing     Problem: Fall Injury Risk  Goal: Absence of Fall and Fall-Related Injury  Outcome: Progressing

## 2025-03-17 NOTE — ASSESSMENT & PLAN NOTE
Noted, chronic  Currently on a steroid regimen from previous admit, will continue to taper  Spoke to patient's colorectal surgeon on 03/14/2025, she is requesting that patient go to rehab for PT and OT and follow up upon discharge from rehab for evaluation for colectomy.    Patient is reporting abdominal pain and loose stools today  Order CT of abdomen and pelvis with IV contrast with findings consistent with colitis versus chronic inflammation sigmoid colon  Check CRP, sed rate and fecal calprotectin  Check stool PCR for pathogens however patient may be colonized with C diff as he has a significant history of this  Also holding off on antibiotics at this time given significant history of C difficile as he is afebrile with normal white count    Transfer initiated to [facility] for evaluation by patient's gastroenterologist Dr. Beal and colorectal surgeon Dr. Church

## 2025-03-17 NOTE — NURSING
Pt discharged to home with ride at side.  Pt walked down to vehicle.  Pt verbalized understanding of discharge orders, but refused discharge teaching and discharge papers.   no c/o pain/nausea/vomitting.  Will CTM.

## 2025-03-17 NOTE — PT/OT/SLP DISCHARGE
Occupational Therapy Discharge Summary    Jacoby Jean-Baptiste  MRN: [id number]   Principal Problem: Intractable pain      Patient Discharged from acute Occupational Therapy on 3/17/2025.  Please refer to prior OT note dated 3/12/2025 for functional status.    Assessment:   Per discussion patient had with PATEL on 3/16/2025, patient stated he does not need or want acute OT services at this time. Will discontinue OT orders per patient's request.     Objective:     GOALS:   Multidisciplinary Problems       Occupational Therapy Goals          Problem: Occupational Therapy    Goal Priority Disciplines Outcome Interventions   Occupational Therapy Goal     OT, PT/OT Progressing    Description: Goals to be met by: 4/10/2025     Patient will increase functional independence with ADLs by performing:    UE Dressing with Set-up Assistance.  LE Dressing with Modified [place] and Assistive Devices as needed.  Grooming while standing at sink with Modified [place].  Toileting from toilet with Modified [place] and Assistive Devices as needed for hygiene and clothing management.   Toilet transfer to toilet with Contact Guard Assistance.                         Plan:     Patient Discharged to: Home no OT services needed    3/17/2025

## 2025-03-17 NOTE — PLAN OF CARE
"Plan of care review with pt, pt states "understanding," lower back pain and esau lower quad abdominal pain is controlled with current regimen, denies having diarrhea although is currently having form stools at this time, ambulated ad zuly with TLSO back brace, I=IV is due to be change, pt refused, states "this iv is working fine," after extensive teaching and redirecting, will continue to monitor, observe and note any changes, safety maintain    "

## 2025-03-17 NOTE — ASSESSMENT & PLAN NOTE
Patient's FSGs are controlled on current medication regimen.  Last A1c reviewed-   Lab Results   Component Value Date    HGBA1C 5.3 03/05/2025     Most recent fingerstick glucose reviewed-   Recent Labs   Lab 03/16/25  2119 03/17/25  0739 03/17/25  1109   POCTGLUCOSE 139* 106 151*     Current correctional scale  Medium  Maintain anti-hyperglycemic dose as follows-   Antihyperglycemics (From admission, onward)      Start     Stop Route Frequency Ordered    03/09/25 2325  insulin aspart U-100 pen 0-10 Units         -- SubQ Before meals & nightly PRN 03/09/25 2226          Hold Oral hypoglycemics while patient is in the hospital.

## 2025-03-17 NOTE — ASSESSMENT & PLAN NOTE
Patient's blood pressure range in the last 24 hours was: BP  Min: 97/54  Max: 138/66.The patient's inpatient anti-hypertensive regimen is listed below:  Current Antihypertensives  metoprolol succinate (TOPROL-XL) 24 hr tablet 50 mg, Daily, Oral    Plan  - BP is controlled, no changes needed to their regimen  -

## 2025-03-17 NOTE — CONSULTS
Patient refusing SNF/rehab placement at this time. Patient agreeable to discharge with resumption of home health. Patient denied additional CM needs

## 2025-03-17 NOTE — ASSESSMENT & PLAN NOTE
Noted, chronic  Currently on a steroid regimen from previous admit, will continue to taper  Spoke to patient's colorectal surgeon on 03/14/2025, she is requesting that patient go to rehab for PT and OT and follow up upon discharge from rehab for evaluation for colectomy.    Patient is reporting abdominal pain and loose stools today  Order CT of abdomen and pelvis with IV contrast with findings consistent with colitis versus chronic inflammation sigmoid colon  Check CRP, sed rate and fecal calprotectin  Check stool PCR for pathogens however patient may be colonized with C diff as he has a significant history of this  Also holding off on antibiotics at this time given significant history of C difficile as he is afebrile with normal white count    Transfer denied to [facility] for evaluation by patient's gastroenterologist Dr. Beal and colorectal surgeon Dr. Church

## 2025-03-17 NOTE — PT/OT/SLP PROGRESS
Physical Therapy Treatment    Patient Name:  Jacoby Jean-Baptiste   MRN:  [id number]    Recommendations:     Discharge Recommendations: High Intensity Therapy  Discharge Equipment Recommendations: none  Barriers to discharge: None    Assessment:     Jacoby Jean-Baptiste is a 50 y.o. male admitted with a medical diagnosis of Intractable pain.  He presents with the following impairments/functional limitations: weakness, impaired endurance, gait instability, pain . Supine in bed anticipating arrival of PTA.  Agreed to mobilize.  Reports back discomfort . Sup> sit with S.  Sit >stand with SBA.  Donned TLSO without difficulty to try no assistive device. Ambulated 20' unsteady with increased pain noted .  Ambulated 60' with rw , CGA for safety.  Ambulated 80'  additional , returned to room to bed.     Rehab Prognosis: Fair; patient would benefit from acute skilled PT services to address these deficits and reach maximum level of function.    Recent Surgery: * No surgery found *      Plan:     During this hospitalization, patient to be seen 6 x/week to address the identified rehab impairments via gait training, therapeutic activities, therapeutic exercises and progress toward the following goals:    Plan of Care Expires:  03/30/25    Subjective     Chief Complaint: back pain   Patient/Family Comments/goals: to return home  Pain/Comfort:  Pain Rating 1: other (see comments) (did not rate)  Location - Orientation 1: generalized  Location 1: back  Pain Addressed 1: Reposition, Pre-medicate for activity, Nurse notified      Objective:     Communicated with nurse Perez prior to session.  Patient found supine with telemetry upon PT entry to room.     General Precautions: Standard, fall  Orthopedic Precautions: spinal precautions  Braces: TLSO  Respiratory Status: Room air     Functional Mobility:  Bed Mobility:     Supine to Sit: supervision  Sit to Supine: supervision  Transfers:     Sit to Stand:  stand by assistance with no AD  Gait:  20' + 60'rw + 80' , CGA for safety, TLSO in place.        AM-PAC 6 CLICK MOBILITY          Treatment & Education:  Ambulate with rw and assistance for safety.     Patient left supine with all lines intact, call button in reach, and nurse Ana notified..    GOALS:   Multidisciplinary Problems       Physical Therapy Goals          Problem: Physical Therapy    Goal Priority Disciplines Outcome Interventions   Physical Therapy Goal     PT, PT/OT Progressing    Description: Goals to be met by: 2025     Patient will increase functional independence with mobility by performin. Supine to sit with Modified [place]  2. Sit to stand transfer with Contact Guard Assistance  3. Bed to chair transfer with Contact Guard Assistance using Rolling Walker  4. Gait  x 50 feet with Minimal Assistance using Rolling Walker.   5. Lower extremity exercise program x20 reps                       DME Justifications:   Jacoby's mobility limitation cannot be sufficiently resolved by the use of a cane. His functional mobility deficit can be sufficiently resolved with the use of a Rolling Walker. Patient's mobility limitation significantly impairs their ability to participate in one of more activities of daily living.  The use of a RW will significantly improve the patient's ability to participate in MRADLS and the patient will use it on regular basis in the home.    Time Tracking:     PT Received On: 25  PT Start Time: 1015     PT Stop Time: 1023  PT Total Time (min): 8 min     Billable Minutes: Gait Training 8min    Treatment Type: Treatment  PT/PTA: PTA     Number of PTA visits since last PT visit: 4     2025
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
## 2025-03-17 NOTE — PLAN OF CARE
Patient requesting [facility] on discharge as he was active with their services in the past - Per [facility], patient not currently active with their services but willing to accept patient back upon hospital discharge - CM to send orders to [facility] once obtained     03/17/25 1213   Post-Acute Status   Post-Acute Authorization Home Health   Home Health Status Pending medical clearance/testing   Patient choice form signed by patient/caregiver List with quality metrics by geographic area provided

## 2025-03-17 NOTE — PLAN OF CARE
CM met with patient at bedside during provider rounds. Discharge plan discussed - patient verbalized that he spoke with surgeon and plans to return home with resumption of home health therapy on discharge. Patient denied additional CM needs at this time. Patient stated will call friend for transport home. Awaiting GI recommendations at this time. CM anticipating hospital discharge once cleared by GI    PCP follow up appointment noted on 03/20/2025 - patient to follow up with PCP as previously scheduled     03/17/25 1208   Discharge Reassessment   Assessment Type Discharge Planning Reassessment   Did the patient's condition or plan change since previous assessment? Yes   Discharge Plan discussed with: Patient   Discharge Plan A Home   Post-Acute Status   Post-Acute Authorization Placement;Home Health   Post-Acute Placement Status Patient declined/refused

## 2025-03-17 NOTE — ASSESSMENT & PLAN NOTE
Anemia is likely due to chronic blood loss and chronic disease. Most recent hemoglobin and hematocrit are listed below.  Recent Labs     03/15/25  0523 03/16/25  0413 03/17/25  0457   HGB 9.8* 8.8* 9.2*   HCT 31.7* 28.6* 30.2*     Plan  - Monitor serial CBC: Daily  - Transfuse PRBC if patient becomes hemodynamically unstable, symptomatic or H/H drops below 7/21.  - Patient has not received any PRBC transfusions to date  - Patient's anemia is currently stable  -

## 2025-03-17 NOTE — DISCHARGE INSTRUCTIONS
Our goal at [facility] is to always give you outstanding care and exceptional service. You may receive a survey from [employer] by mail, text or e-mail in the next 24-48 hours asking about the care you received with us. The survey should only take 5-10 minutes to complete and is very important to us.     Your feedback provides us with a way to recognize our staff who work tirelessly to provide the best care! Also, your responses help us learn how to improve when your experience was below our aspiration of excellence. We are always looking for ways to improve your stay. We WILL use your feedback to continue making improvements to help us provide the highest quality care. We keep your personal information and feedback confidential. We appreciate your time completing this survey and can't wait to hear from you!!!    We look forward to your continued care with us! Thanks so much for choosing [facility] for your healthcare needs!

## 2025-03-17 NOTE — PLAN OF CARE
Discharge orders and chart reviewed. No other discharge needs noted at this time. Pt is clear for discharge from case management. Pt is discharging to home with [facility].    HH orders sent via Epic fax - CM awaiting SOC date at this time    Patient to follow up with PCP/GI as previously scheduled    Patient stated will call family for transport home. Denied additional CM needs     03/17/25 1509   Final Note   Assessment Type Final Discharge Note   Anticipated Discharge Disposition Home-Health   What phone number can be called within the next 1-3 days to see how you are doing after discharge? [phone]   Hospital Resources/Appts/Education Provided Appointments scheduled and added to AVS;Post-Acute resouces added to AVS   Post-Acute Status   Post-Acute Authorization Home Health   Home Health Status Set-up Complete/Auth obtained   Discharge Delays (!) Personal Transportation

## 2025-03-17 NOTE — PLAN OF CARE
Problem: Physical Therapy  Goal: Physical Therapy Goal  Description: Goals to be met by: 2025     Patient will increase functional independence with mobility by performin. Supine to sit with Modified [place]  2. Sit to stand transfer with Contact Guard Assistance  3. Bed to chair transfer with Contact Guard Assistance using Rolling Walker  4. Gait  x 50 feet with Minimal Assistance using Rolling Walker.   5. Lower extremity exercise program x20 reps  Outcome: Progressing   Ambulate with rw and assistance for safety.
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
## 2025-03-17 NOTE — NURSING
"Patient requested to shower. IV covered with clear covering. Noted date on IV past due date. I don't need a new IV, I am about to go home, I am a hard stick". Flushed well.   "

## 2025-03-18 ENCOUNTER — PATIENT MESSAGE (OUTPATIENT)
Dept: FAMILY MEDICINE | Facility: CLINIC | Age: 50
End: 2025-03-18
Payer: COMMERCIAL

## 2025-03-19 LAB — CALPROTECTIN STL-MCNT: ABNORMAL MCG/G

## 2025-03-20 ENCOUNTER — OFFICE VISIT (OUTPATIENT)
Dept: FAMILY MEDICINE | Facility: CLINIC | Age: 50
End: 2025-03-20
Payer: COMMERCIAL

## 2025-03-20 ENCOUNTER — PATIENT OUTREACH (OUTPATIENT)
Dept: ADMINISTRATIVE | Facility: OTHER | Age: 50
End: 2025-03-20
Payer: COMMERCIAL

## 2025-03-20 VITALS
TEMPERATURE: 98 F | HEIGHT: 68 IN | HEART RATE: 82 BPM | DIASTOLIC BLOOD PRESSURE: 78 MMHG | BODY MASS INDEX: 43.42 KG/M2 | OXYGEN SATURATION: 98 % | SYSTOLIC BLOOD PRESSURE: 118 MMHG | WEIGHT: 286.5 LBS
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
## 2025-03-20 DIAGNOSIS — K51.919 ULCERATIVE COLITIS WITH COMPLICATION, UNSPECIFIED LOCATION: ICD-10-CM

## 2025-03-20 DIAGNOSIS — S32.019D CLOSED FRACTURE OF FIRST LUMBAR VERTEBRA WITH ROUTINE HEALING, UNSPECIFIED FRACTURE MORPHOLOGY, SUBSEQUENT ENCOUNTER: ICD-10-CM

## 2025-03-20 DIAGNOSIS — F41.9 ANXIETY: ICD-10-CM

## 2025-03-20 DIAGNOSIS — S22.089D CLOSED FRACTURE OF TWELFTH THORACIC VERTEBRA WITH ROUTINE HEALING, UNSPECIFIED FRACTURE MORPHOLOGY, SUBSEQUENT ENCOUNTER: ICD-10-CM

## 2025-03-20 PROCEDURE — 99999 PR PBB SHADOW E&M-EST. PATIENT-LVL IV: CPT | Mod: PBBFAC,,, | Performed by: FAMILY MEDICINE

## 2025-03-20 RX ORDER — DIAZEPAM 5 MG/1
5 TABLET ORAL DAILY PRN
Qty: 30 TABLET | Refills: 0 | Status: SHIPPED | OUTPATIENT
Start: 2025-03-20

## 2025-03-20 NOTE — PROGRESS NOTES
CHW - Case Closure    This Community Health Worker spoke to patient via telephone today.   Pt/Caregiver reported: Pt states he is going to do Home Health until he has colon surgery and will ask for inpatient facility placement then.  Pt/Caregiver denied any additional needs at this time and agrees with episode closure at this time.  Provided patient with Community Health Worker's contact information and encouraged him/her to contact this Community Health Worker if additional needs arise.

## 2025-03-21 ENCOUNTER — DOCUMENT SCAN (OUTPATIENT)
Dept: HOME HEALTH SERVICES | Facility: HOSPITAL | Age: 50
End: 2025-03-21
Payer: COMMERCIAL

## 2025-03-21 ENCOUNTER — PATIENT MESSAGE (OUTPATIENT)
Dept: FAMILY MEDICINE | Facility: CLINIC | Age: 50
End: 2025-03-21
Payer: COMMERCIAL

## 2025-03-21 ENCOUNTER — OFFICE VISIT (OUTPATIENT)
Dept: PAIN MEDICINE | Facility: CLINIC | Age: 50
End: 2025-03-21
Payer: COMMERCIAL

## 2025-03-21 VITALS
HEART RATE: 86 BPM | SYSTOLIC BLOOD PRESSURE: 130 MMHG | DIASTOLIC BLOOD PRESSURE: 80 MMHG | HEIGHT: 68 IN | BODY MASS INDEX: 43.35 KG/M2 | WEIGHT: 286 LBS

## 2025-03-21 DIAGNOSIS — S32.010D CLOSED WEDGE COMPRESSION FRACTURE OF L1 VERTEBRA WITH ROUTINE HEALING, SUBSEQUENT ENCOUNTER: Primary | ICD-10-CM

## 2025-03-21 DIAGNOSIS — S22.080D CLOSED WEDGE COMPRESSION FRACTURE OF T12 VERTEBRA WITH ROUTINE HEALING, SUBSEQUENT ENCOUNTER: ICD-10-CM

## 2025-03-21 DIAGNOSIS — E11.9 TYPE 2 DIABETES MELLITUS WITHOUT COMPLICATION, WITHOUT LONG-TERM CURRENT USE OF INSULIN: ICD-10-CM

## 2025-03-21 DIAGNOSIS — K51.019 ULCERATIVE PANCOLITIS WITH COMPLICATION: ICD-10-CM

## 2025-03-21 PROCEDURE — 99999 PR PBB SHADOW E&M-EST. PATIENT-LVL IV: CPT | Mod: PBBFAC,,, | Performed by: ANESTHESIOLOGY
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
## 2025-03-23 ENCOUNTER — HOSPITAL ENCOUNTER (OUTPATIENT)
Facility: HOSPITAL | Age: 50
Discharge: HOME | End: 2025-03-24
Attending: EMERGENCY MEDICINE | Admitting: STUDENT IN AN ORGANIZED HEALTH CARE EDUCATION/TRAINING PROGRAM
Payer: COMMERCIAL

## 2025-03-23 DIAGNOSIS — R19.7 NAUSEA VOMITING AND DIARRHEA: ICD-10-CM

## 2025-03-23 DIAGNOSIS — R07.9 CHEST PAIN: ICD-10-CM

## 2025-03-23 DIAGNOSIS — M54.50 CHRONIC LOW BACK PAIN, UNSPECIFIED BACK PAIN LATERALITY, UNSPECIFIED WHETHER SCIATICA PRESENT: ICD-10-CM

## 2025-03-23 DIAGNOSIS — K52.9 COLITIS: Primary | ICD-10-CM

## 2025-03-23 DIAGNOSIS — R11.2 NAUSEA VOMITING AND DIARRHEA: ICD-10-CM

## 2025-03-23 DIAGNOSIS — G89.29 CHRONIC LOW BACK PAIN, UNSPECIFIED BACK PAIN LATERALITY, UNSPECIFIED WHETHER SCIATICA PRESENT: ICD-10-CM

## 2025-03-23 PROBLEM — D64.9 ANEMIA: Status: RESOLVED | Noted: 2024-12-23 | Resolved: 2025-03-23

## 2025-03-23 PROBLEM — E83.42 HYPOMAGNESEMIA: Status: RESOLVED | Noted: 2025-02-28 | Resolved: 2025-03-23

## 2025-03-23 PROBLEM — R42 DIZZINESS: Status: RESOLVED | Noted: 2024-12-16 | Resolved: 2025-03-23

## 2025-03-23 PROBLEM — K31.84 GASTROPARESIS: Status: RESOLVED | Noted: 2024-12-23 | Resolved: 2025-03-23

## 2025-03-23 PROBLEM — R53.1 GENERALIZED WEAKNESS: Status: RESOLVED | Noted: 2025-03-05 | Resolved: 2025-03-23

## 2025-03-23 PROBLEM — E66.01 SEVERE OBESITY: Status: ACTIVE | Noted: 2025-03-23

## 2025-03-23 PROBLEM — K51.90: Status: RESOLVED | Noted: 2023-04-20 | Resolved: 2025-03-23

## 2025-03-23 PROBLEM — E87.5 HYPERKALEMIA: Status: RESOLVED | Noted: 2025-03-01 | Resolved: 2025-03-23

## 2025-03-23 PROBLEM — R11.0 NAUSEA: Status: RESOLVED | Noted: 2024-12-22 | Resolved: 2025-03-23

## 2025-03-23 PROBLEM — R29.818 ACUTE FOCAL NEUROLOGICAL DEFICIT: Status: RESOLVED | Noted: 2025-03-05 | Resolved: 2025-03-23

## 2025-03-23 PROBLEM — L02.91 SKIN ABSCESS: Status: RESOLVED | Noted: 2024-08-09 | Resolved: 2025-03-23

## 2025-03-23 PROBLEM — K92.2 GI BLEED: Status: RESOLVED | Noted: 2023-04-20 | Resolved: 2025-03-23

## 2025-03-23 PROBLEM — D72.829 LEUKOCYTOSIS: Status: RESOLVED | Noted: 2025-02-02 | Resolved: 2025-03-23

## 2025-03-23 PROBLEM — I82.409 DVT (DEEP VENOUS THROMBOSIS): Status: RESOLVED | Noted: 2024-12-06 | Resolved: 2025-03-23

## 2025-03-23 PROBLEM — R52 INTRACTABLE PAIN: Status: RESOLVED | Noted: 2024-09-18 | Resolved: 2025-03-23

## 2025-03-23 LAB
ABSOLUTE EOSINOPHIL (SMH): 0.07 K/UL
ABSOLUTE MONOCYTE (SMH): 1.24 K/UL (ref 0.3–1)
ABSOLUTE NEUTROPHIL COUNT (SMH): 13.6 K/UL (ref 1.8–7.7)
ALBUMIN SERPL-MCNC: 3.6 G/DL (ref 3.5–5.2)
ALP SERPL-CCNC: 93 UNIT/L (ref 55–135)
ALT SERPL-CCNC: 20 UNIT/L (ref 10–44)
ANION GAP (SMH): 7 MMOL/L (ref 8–16)
AST SERPL-CCNC: 10 UNIT/L (ref 10–40)
BASOPHILS # BLD AUTO: 0.03 K/UL
BASOPHILS NFR BLD AUTO: 0.2 %
BILIRUB SERPL-MCNC: 0.2 MG/DL (ref 0.1–1)
BUN SERPL-MCNC: 32 MG/DL (ref 6–20)
C-REACTIVE PROTEIN (SMH): 1.2 MG/DL
CALCIUM SERPL-MCNC: 9 MG/DL (ref 8.7–10.5)
CHLORIDE SERPL-SCNC: 107 MMOL/L (ref 95–110)
CO2 SERPL-SCNC: 26 MMOL/L (ref 23–29)
CREAT SERPL-MCNC: 1.2 MG/DL (ref 0.5–1.4)
ERYTHROCYTE [DISTWIDTH] IN BLOOD BY AUTOMATED COUNT: 19 % (ref 11.5–14.5)
ERYTHROCYTE [SEDIMENTATION RATE] IN BLOOD: 36 MM/HR (ref 0–10)
GFR SERPLBLD CREATININE-BSD FMLA CKD-EPI: >60 ML/MIN/1.73/M2
GLUCOSE SERPL-MCNC: 131 MG/DL (ref 70–110)
HCT VFR BLD AUTO: 29.8 % (ref 40–54)
HGB BLD-MCNC: 8.8 GM/DL (ref 14–18)
IMM GRANULOCYTES # BLD AUTO: 0.21 K/UL (ref 0–0.04)
IMM GRANULOCYTES NFR BLD AUTO: 1.2 % (ref 0–0.5)
LIPASE SERPL-CCNC: 10 U/L (ref 4–60)
LYMPHOCYTES # BLD AUTO: 1.7 K/UL (ref 1–4.8)
MCH RBC QN AUTO: 27.1 PG (ref 27–31)
MCHC RBC AUTO-ENTMCNC: 29.5 G/DL (ref 32–36)
MCV RBC AUTO: 92 FL (ref 82–98)
NUCLEATED RBC (/100WBC) (SMH): 0 /100 WBC
PLATELET # BLD AUTO: 290 K/UL (ref 150–450)
PMV BLD AUTO: 10.6 FL (ref 9.2–12.9)
POCT GLUCOSE: 110 MG/DL (ref 70–110)
POCT GLUCOSE: 127 MG/DL (ref 70–110)
POTASSIUM SERPL-SCNC: 4 MMOL/L (ref 3.5–5.1)
PROT SERPL-MCNC: 6.2 GM/DL (ref 6–8.4)
RBC # BLD AUTO: 3.25 M/UL (ref 4.6–6.2)
RELATIVE EOSINOPHIL (SMH): 0.4 % (ref 0–8)
RELATIVE LYMPHOCYTE (SMH): 10.1 % (ref 18–48)
RELATIVE MONOCYTE (SMH): 7.4 % (ref 4–15)
RELATIVE NEUTROPHIL (SMH): 80.7 % (ref 38–73)
SODIUM SERPL-SCNC: 140 MMOL/L (ref 136–145)
WBC # BLD AUTO: 16.83 K/UL (ref 3.9–12.7)

## 2025-03-23 PROCEDURE — 25000003 PHARM REV CODE 250: Performed by: INTERNAL MEDICINE

## 2025-03-23 PROCEDURE — 25000003 PHARM REV CODE 250: Performed by: STUDENT IN AN ORGANIZED HEALTH CARE EDUCATION/TRAINING PROGRAM

## 2025-03-23 PROCEDURE — G0378 HOSPITAL OBSERVATION PER HR: HCPCS

## 2025-03-23 PROCEDURE — 85025 COMPLETE CBC W/AUTO DIFF WBC: CPT | Performed by: EMERGENCY MEDICINE

## 2025-03-23 PROCEDURE — 82040 ASSAY OF SERUM ALBUMIN: CPT | Performed by: EMERGENCY MEDICINE

## 2025-03-23 PROCEDURE — 96375 TX/PRO/DX INJ NEW DRUG ADDON: CPT

## 2025-03-23 PROCEDURE — 83690 ASSAY OF LIPASE: CPT | Performed by: EMERGENCY MEDICINE

## 2025-03-23 PROCEDURE — 86140 C-REACTIVE PROTEIN: CPT | Performed by: STUDENT IN AN ORGANIZED HEALTH CARE EDUCATION/TRAINING PROGRAM

## 2025-03-23 PROCEDURE — 85651 RBC SED RATE NONAUTOMATED: CPT | Performed by: STUDENT IN AN ORGANIZED HEALTH CARE EDUCATION/TRAINING PROGRAM

## 2025-03-23 PROCEDURE — 36415 COLL VENOUS BLD VENIPUNCTURE: CPT | Performed by: EMERGENCY MEDICINE

## 2025-03-23 PROCEDURE — 96374 THER/PROPH/DIAG INJ IV PUSH: CPT

## 2025-03-23 PROCEDURE — 96376 TX/PRO/DX INJ SAME DRUG ADON: CPT

## 2025-03-23 PROCEDURE — 63600175 PHARM REV CODE 636 W HCPCS: Performed by: EMERGENCY MEDICINE

## 2025-03-23 PROCEDURE — 25500020 PHARM REV CODE 255: Performed by: EMERGENCY MEDICINE

## 2025-03-23 PROCEDURE — 99285 EMERGENCY DEPT VISIT HI MDM: CPT | Mod: 25

## 2025-03-23 PROCEDURE — 96372 THER/PROPH/DIAG INJ SC/IM: CPT | Performed by: STUDENT IN AN ORGANIZED HEALTH CARE EDUCATION/TRAINING PROGRAM

## 2025-03-23 PROCEDURE — 63600175 PHARM REV CODE 636 W HCPCS: Performed by: STUDENT IN AN ORGANIZED HEALTH CARE EDUCATION/TRAINING PROGRAM

## 2025-03-23 RX ORDER — SODIUM,POTASSIUM PHOSPHATES 280-250MG
2 POWDER IN PACKET (EA) ORAL
Status: DISCONTINUED | OUTPATIENT
Start: 2025-03-23 | End: 2025-03-24 | Stop reason: HOSPADM

## 2025-03-23 RX ORDER — ONDANSETRON HYDROCHLORIDE 2 MG/ML
4 INJECTION, SOLUTION INTRAVENOUS EVERY 8 HOURS PRN
Status: DISCONTINUED | OUTPATIENT
Start: 2025-03-23 | End: 2025-03-24 | Stop reason: HOSPADM

## 2025-03-23 RX ORDER — METHOCARBAMOL 750 MG/1
750 TABLET, FILM COATED ORAL 4 TIMES DAILY PRN
Status: DISCONTINUED | OUTPATIENT
Start: 2025-03-23 | End: 2025-03-24 | Stop reason: HOSPADM

## 2025-03-23 RX ORDER — ONDANSETRON HYDROCHLORIDE 2 MG/ML
4 INJECTION, SOLUTION INTRAVENOUS
Status: COMPLETED | OUTPATIENT
Start: 2025-03-23 | End: 2025-03-23

## 2025-03-23 RX ORDER — IBUPROFEN 200 MG
24 TABLET ORAL
Status: DISCONTINUED | OUTPATIENT
Start: 2025-03-23 | End: 2025-03-24 | Stop reason: HOSPADM

## 2025-03-23 RX ORDER — BUSPIRONE HYDROCHLORIDE 5 MG/1
15 TABLET ORAL 3 TIMES DAILY
Status: DISCONTINUED | OUTPATIENT
Start: 2025-03-23 | End: 2025-03-24 | Stop reason: HOSPADM

## 2025-03-23 RX ORDER — HYDROMORPHONE HYDROCHLORIDE 1 MG/ML
0.5 INJECTION, SOLUTION INTRAMUSCULAR; INTRAVENOUS; SUBCUTANEOUS EVERY 6 HOURS PRN
Status: DISCONTINUED | OUTPATIENT
Start: 2025-03-23 | End: 2025-03-24 | Stop reason: HOSPADM

## 2025-03-23 RX ORDER — OXYCODONE HYDROCHLORIDE 5 MG/1
5 TABLET ORAL EVERY 6 HOURS PRN
Refills: 0 | Status: DISCONTINUED | OUTPATIENT
Start: 2025-03-23 | End: 2025-03-24 | Stop reason: HOSPADM

## 2025-03-23 RX ORDER — IBUPROFEN 200 MG
16 TABLET ORAL
Status: DISCONTINUED | OUTPATIENT
Start: 2025-03-23 | End: 2025-03-24 | Stop reason: HOSPADM

## 2025-03-23 RX ORDER — ZOLPIDEM TARTRATE 5 MG/1
5 TABLET ORAL NIGHTLY PRN
Status: DISCONTINUED | OUTPATIENT
Start: 2025-03-23 | End: 2025-03-24 | Stop reason: HOSPADM

## 2025-03-23 RX ORDER — PANTOPRAZOLE SODIUM 40 MG/1
40 TABLET, DELAYED RELEASE ORAL 2 TIMES DAILY
Status: DISCONTINUED | OUTPATIENT
Start: 2025-03-23 | End: 2025-03-24 | Stop reason: HOSPADM

## 2025-03-23 RX ORDER — PREGABALIN 25 MG/1
50 CAPSULE ORAL 2 TIMES DAILY
Status: DISCONTINUED | OUTPATIENT
Start: 2025-03-23 | End: 2025-03-24 | Stop reason: HOSPADM

## 2025-03-23 RX ORDER — DIAZEPAM 5 MG/1
5 TABLET ORAL DAILY PRN
Status: DISCONTINUED | OUTPATIENT
Start: 2025-03-23 | End: 2025-03-24 | Stop reason: HOSPADM

## 2025-03-23 RX ORDER — ATORVASTATIN CALCIUM 10 MG/1
10 TABLET, FILM COATED ORAL DAILY
Status: DISCONTINUED | OUTPATIENT
Start: 2025-03-23 | End: 2025-03-24 | Stop reason: HOSPADM

## 2025-03-23 RX ORDER — SODIUM CHLORIDE 0.9 % (FLUSH) 0.9 %
10 SYRINGE (ML) INJECTION
Status: DISCONTINUED | OUTPATIENT
Start: 2025-03-23 | End: 2025-03-24 | Stop reason: HOSPADM

## 2025-03-23 RX ORDER — GLUCAGON 1 MG
1 KIT INJECTION
Status: DISCONTINUED | OUTPATIENT
Start: 2025-03-23 | End: 2025-03-24 | Stop reason: HOSPADM

## 2025-03-23 RX ORDER — INSULIN ASPART 100 [IU]/ML
0-10 INJECTION, SOLUTION INTRAVENOUS; SUBCUTANEOUS
Status: DISCONTINUED | OUTPATIENT
Start: 2025-03-23 | End: 2025-03-24 | Stop reason: HOSPADM

## 2025-03-23 RX ORDER — HYDROMORPHONE HYDROCHLORIDE 1 MG/ML
1 INJECTION, SOLUTION INTRAMUSCULAR; INTRAVENOUS; SUBCUTANEOUS EVERY 4 HOURS PRN
Refills: 0 | Status: DISCONTINUED | OUTPATIENT
Start: 2025-03-23 | End: 2025-03-23

## 2025-03-23 RX ORDER — ENOXAPARIN SODIUM 100 MG/ML
40 INJECTION SUBCUTANEOUS EVERY 12 HOURS
Status: DISCONTINUED | OUTPATIENT
Start: 2025-03-23 | End: 2025-03-24 | Stop reason: HOSPADM

## 2025-03-23 RX ORDER — HYDROMORPHONE HYDROCHLORIDE 1 MG/ML
1 INJECTION, SOLUTION INTRAMUSCULAR; INTRAVENOUS; SUBCUTANEOUS
Refills: 0 | Status: COMPLETED | OUTPATIENT
Start: 2025-03-23 | End: 2025-03-23

## 2025-03-23 RX ORDER — METOPROLOL SUCCINATE 50 MG/1
50 TABLET, EXTENDED RELEASE ORAL DAILY
Status: DISCONTINUED | OUTPATIENT
Start: 2025-03-23 | End: 2025-03-24 | Stop reason: HOSPADM

## 2025-03-23 RX ORDER — PREDNISONE 5 MG/1
5 TABLET ORAL DAILY
Status: DISCONTINUED | OUTPATIENT
Start: 2025-03-23 | End: 2025-03-24 | Stop reason: HOSPADM

## 2025-03-23 RX ORDER — ENOXAPARIN SODIUM 100 MG/ML
40 INJECTION SUBCUTANEOUS EVERY 24 HOURS
Status: DISCONTINUED | OUTPATIENT
Start: 2025-03-23 | End: 2025-03-23

## 2025-03-23 RX ORDER — ESCITALOPRAM OXALATE 10 MG/1
20 TABLET ORAL DAILY
Status: DISCONTINUED | OUTPATIENT
Start: 2025-03-23 | End: 2025-03-24 | Stop reason: HOSPADM

## 2025-03-23 RX ORDER — NALOXONE HCL 0.4 MG/ML
0.02 VIAL (ML) INJECTION
Status: DISCONTINUED | OUTPATIENT
Start: 2025-03-23 | End: 2025-03-24 | Stop reason: HOSPADM

## 2025-03-23 RX ORDER — ACETAMINOPHEN 325 MG/1
650 TABLET ORAL EVERY 8 HOURS PRN
Status: DISCONTINUED | OUTPATIENT
Start: 2025-03-23 | End: 2025-03-24 | Stop reason: HOSPADM

## 2025-03-23 RX ADMIN — ATORVASTATIN CALCIUM 10 MG: 10 TABLET, FILM COATED ORAL at 09:03

## 2025-03-23 RX ADMIN — OXYCODONE HYDROCHLORIDE 5 MG: 5 TABLET ORAL at 09:03

## 2025-03-23 RX ADMIN — METHOCARBAMOL 750 MG: 750 TABLET ORAL at 10:03

## 2025-03-23 RX ADMIN — ESCITALOPRAM OXALATE 20 MG: 10 TABLET ORAL at 09:03

## 2025-03-23 RX ADMIN — IOHEXOL 100 ML: 350 INJECTION, SOLUTION INTRAVENOUS at 06:03

## 2025-03-23 RX ADMIN — ONDANSETRON 4 MG: 2 INJECTION INTRAMUSCULAR; INTRAVENOUS at 05:03

## 2025-03-23 RX ADMIN — METOPROLOL SUCCINATE 50 MG: 50 TABLET, EXTENDED RELEASE ORAL at 09:03

## 2025-03-23 RX ADMIN — HYDROMORPHONE HYDROCHLORIDE 1 MG: 1 INJECTION, SOLUTION INTRAMUSCULAR; INTRAVENOUS; SUBCUTANEOUS at 05:03

## 2025-03-23 RX ADMIN — PREGABALIN 50 MG: 25 CAPSULE ORAL at 08:03

## 2025-03-23 RX ADMIN — PANTOPRAZOLE SODIUM 40 MG: 40 TABLET, DELAYED RELEASE ORAL at 09:03

## 2025-03-23 RX ADMIN — PANTOPRAZOLE SODIUM 40 MG: 40 TABLET, DELAYED RELEASE ORAL at 05:03

## 2025-03-23 RX ADMIN — BUSPIRONE HYDROCHLORIDE 15 MG: 5 TABLET ORAL at 08:03

## 2025-03-23 RX ADMIN — PREGABALIN 50 MG: 25 CAPSULE ORAL at 09:03

## 2025-03-23 RX ADMIN — ENOXAPARIN SODIUM 40 MG: 40 INJECTION SUBCUTANEOUS at 08:03

## 2025-03-23 RX ADMIN — ZOLPIDEM TARTRATE 5 MG: 5 TABLET, FILM COATED ORAL at 10:03

## 2025-03-23 RX ADMIN — PREDNISONE 5 MG: 5 TABLET ORAL at 09:03

## 2025-03-23 RX ADMIN — ONDANSETRON 4 MG: 2 INJECTION INTRAMUSCULAR; INTRAVENOUS at 03:03

## 2025-03-23 RX ADMIN — ENOXAPARIN SODIUM 40 MG: 40 INJECTION SUBCUTANEOUS at 09:03

## 2025-03-23 RX ADMIN — BUSPIRONE HYDROCHLORIDE 15 MG: 5 TABLET ORAL at 03:03

## 2025-03-23 RX ADMIN — BUSPIRONE HYDROCHLORIDE 15 MG: 5 TABLET ORAL at 09:03

## 2025-03-23 RX ADMIN — HYDROMORPHONE HYDROCHLORIDE 0.5 MG: 1 INJECTION, SOLUTION INTRAMUSCULAR; INTRAVENOUS; SUBCUTANEOUS at 03:03
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
## 2025-03-23 NOTE — SUBJECTIVE & OBJECTIVE
Past Medical History:   Diagnosis Date    C. difficile colitis     Cavitary pneumonia 11/2023    pos aspergillus serology    Diabetes mellitus 01/2022    Hyperlipidemia 2015    Hypertension 2015    Insomnia 2015    Morbid obesity 03/05/2025    Ulcerative colitis        Past Surgical History:   Procedure Laterality Date    BRONCHOSCOPY WITH FLUOROSCOPY Left 11/20/2023    Procedure: BRONCHOSCOPY, WITH FLUOROSCOPY;  Surgeon: Lisa Villarreal MD;  Location: [facility];  Service: Pulmonary;  Laterality: Left;    BRONCHOSCOPY WITH FLUOROSCOPY N/A 11/30/2023    Procedure: BRONCHOSCOPY, WITH FLUOROSCOPY;  Surgeon: Lisa Villarreal MD;  Location: [facility];  Service: Pulmonary;  Laterality: N/A;    CARDIAC ELECTROPHYSIOLOGY MAPPING AND ABLATION  2017    SVT    CHOLECYSTECTOMY  2011    COLONOSCOPY N/A 5/3/2022    Procedure: COLONOSCOPY;  Surgeon: Shan Jean III, MD;  Location: [facility];  Service: Endoscopy;  Laterality: N/A;    COLONOSCOPY N/A 3/16/2024    Procedure: COLONOSCOPY;  Surgeon: ALEKSANDER Ramos MD;  Location: [facility];  Service: Endoscopy;  Laterality: N/A;    COLONOSCOPY N/A 1/17/2025    Procedure: COLONOSCOPY;  Surgeon: Russ Rowe MD;  Location: [facility];  Service: Endoscopy;  Laterality: N/A;    COLONOSCOPY WITH FECAL MICROBIOTA TRANSFER N/A 3/21/2023    Procedure: COLONOSCOPY, WITH FECAL MICROBIOTA TRANSFER;  Surgeon: David Millan MD;  Location: [facility];  Service: Endoscopy;  Laterality: N/A;    ESOPHAGOGASTRODUODENOSCOPY N/A 4/24/2023    Procedure: EGD (ESOPHAGOGASTRODUODENOSCOPY);  Surgeon: Shan Jean III, MD;  Location: [facility];  Service: Endoscopy;  Laterality: N/A;    ESOPHAGOGASTRODUODENOSCOPY N/A 6/5/2024    Procedure: EGD (ESOPHAGOGASTRODUODENOSCOPY);  Surgeon: Odalis Mccabe MD;  Location: [facility];  Service: Endoscopy;  Laterality: N/A;    FLEXIBLE SIGMOIDOSCOPY N/A 6/5/2024    Procedure: SIGMOIDOSCOPY, FLEXIBLE;  Surgeon: Odalis Mccabe MD;   Location: [facility]O;  Service: Endoscopy;  Laterality: N/A;    FLEXIBLE SIGMOIDOSCOPY N/A 7/16/2024    Procedure: SIGMOIDOSCOPY, FLEXIBLE;  Surgeon: Shan Jean III, MD;  Location: [facility];  Service: Endoscopy;  Laterality: N/A;    FLEXIBLE SIGMOIDOSCOPY N/A 8/13/2024    Procedure: SIGMOIDOSCOPY, FLEXIBLE;  Surgeon: Shan Jean III, MD;  Location: [facility];  Service: Endoscopy;  Laterality: N/A;    GANGLION CYST EXCISION Left 1992    MAGNETIC RESONANCE IMAGING N/A 3/7/2025    Procedure: MRI (MAGNETIC RESONANCE IMAGING);  Surgeon: Provider, Dosc Diagnostic;  Location: [facility];  Service: General;  Laterality: N/A;    UMBILICAL HERNIA REPAIR  2011    with mesh       Review of patient's allergies indicates:  No Known Allergies    Current Facility-Administered Medications on File Prior to Encounter   Medication    lactated ringers infusion     Current Outpatient Medications on File Prior to Encounter   Medication Sig    busPIRone (BUSPAR) 15 MG tablet Take 1 tablet (15 mg total) by mouth 3 (three) times daily.    diazePAM (VALIUM) 5 MG tablet Take 1 tablet (5 mg total) by mouth daily as needed for Anxiety.    ergocalciferol (ERGOCALCIFEROL) 50,000 unit Cap Take 1 capsule by mouth every Sunday.    EScitalopram oxalate (LEXAPRO) 20 MG tablet Take 1 tablet (20 mg total) by mouth once daily.    methocarbamoL (ROBAXIN) 750 MG Tab Take 1 tablet (750 mg total) by mouth 4 (four) times daily as needed (muscle spasm).    metoprolol succinate (TOPROL-XL) 50 MG 24 hr tablet Take 1 tablet (50 mg total) by mouth once daily.    oxyCODONE (ROXICODONE) 15 MG Tab Take 1 tablet (15 mg total) by mouth every 6 (six) hours as needed for Pain.    pantoprazole (PROTONIX) 40 MG tablet Take 40 mg by mouth 2 (two) times daily.    pregabalin (LYRICA) 50 MG capsule Take 1 capsule by mouth 2 (two) times daily. (Patient taking differently: Take 100 mg by mouth 3 (three) times daily.)    simvastatin (ZOCOR) 20 MG tablet Take 1  tablet (20 mg total) by mouth every evening.    tofacitinib (XELJANZ) 10 mg Tab Take 10 mg by mouth 2 (two) times daily. Samples from MD    zolpidem (AMBIEN) 10 mg Tab Take 1 tablet (10 mg total) by mouth nightly as needed (insomnia).    predniSONE (DELTASONE) 20 MG tablet Take 1 tablet (20 mg total) by mouth once daily for 3 days, THEN 0.5 tablets (10 mg total) once daily for 3 days. (Patient not taking: Reported on 3/23/2025)    [DISCONTINUED] promethazine (PHENERGAN) 25 MG tablet Take 25 mg by mouth 4 (four) times daily as needed for Nausea.     Family History       Problem Relation (Age of Onset)    Asthma Mother          Tobacco Use    Smoking status: Former     Average packs/day: 1 pack/day for 30.0 years (30.0 ttl pk-yrs)     Types: Cigarettes     Start date: 1993    Smokeless tobacco: Never    Tobacco comments:     Pt states he has stopped smoking with Chantix three weeks ago. 12/1/23   Substance and Sexual Activity    Alcohol use: Yes     Comment: ocass    Drug use: Not Currently    Sexual activity: Not Currently     Review of Systems   Constitutional:  Positive for activity change. Negative for appetite change.   Gastrointestinal:  Negative for diarrhea, nausea and vomiting.   Musculoskeletal:  Positive for back pain.   Allergic/Immunologic: Positive for immunocompromised state.     Objective:     Vital Signs (Most Recent):  Temp: 97.7 °F (36.5 °C) (03/23/25 0350)  Pulse: 80 (03/23/25 0918)  Resp: 18 (03/23/25 0918)  BP: 137/77 (03/23/25 0918)  SpO2: 95 % (03/23/25 0830) Vital Signs (24h Range):  Temp:  [97.7 °F (36.5 °C)] 97.7 °F (36.5 °C)  Pulse:  [80-98] 80  Resp:  [12-22] 18  SpO2:  [94 %-100 %] 95 %  BP: ()/(56-95) 137/77     Weight: 128.4 kg (283 lb)  Body mass index is 45.68 kg/m².     Physical Exam  Vitals and nursing note reviewed.   Constitutional:       General: He is not in acute distress.     Appearance: He is obese.   HENT:      Head: Normocephalic and atraumatic.      Right Ear:  External ear normal.      Left Ear: External ear normal.      Nose: Nose normal.      Mouth/Throat:      Mouth: Mucous membranes are moist.   Eyes:      Extraocular Movements: Extraocular movements intact.      Conjunctiva/sclera: Conjunctivae normal.   Cardiovascular:      Rate and Rhythm: Normal rate and regular rhythm.      Pulses: Normal pulses.      Heart sounds: Normal heart sounds.   Pulmonary:      Effort: Pulmonary effort is normal.      Breath sounds: Normal breath sounds.   Abdominal:      General: Bowel sounds are normal.      Palpations: Abdomen is soft.   Musculoskeletal:         General: Normal range of motion.      Cervical back: Normal range of motion and neck supple.      Right lower leg: No edema.      Left lower leg: No edema.   Skin:     General: Skin is warm.   Neurological:      Mental Status: He is alert. Mental status is at baseline.   Psychiatric:         Mood and Affect: Mood normal.         Behavior: Behavior normal.                Significant Labs: All pertinent labs within the past 24 hours have been reviewed.    Significant Imaging: I have reviewed all pertinent imaging results/findings within the past 24 hours.

## 2025-03-23 NOTE — ED PROVIDER NOTES
Encounter Date: 3/23/2025       History     Chief Complaint   Patient presents with    Back Pain     2 fractured vertebrae since February. Pain worse today.    Vomiting     Vomiting and soft stool for a few days. Pt denies blood. States that he thinks his ulcerative colitis is acting up.     This is a 50-year-old male presenting with chief complaint nausea, vomiting, diarrhea, and generalized abdominal pain.  He has history of ulcerative colitis and thinks it is flaring up.  He says this began yesterday.  He has vomited 5 times yesterday evening and this morning.  He denies any blood in his stool.  He also complains exacerbation of ongoing low back pain.  He says he sustained to vertebral compression fractures last month in his had persistent pain since then.  The pain has been worse than normal the last 2 days.  He also says he has had some left leg numbness and weakness.  He denies incontinence.  He says the pain is not controlled with his home pain medication.    The history is provided by the patient.     Review of patient's allergies indicates:  No Known Allergies  Past Medical History:   Diagnosis Date    C. difficile colitis     Cavitary pneumonia 11/2023    pos aspergillus serology    Diabetes mellitus 01/2022    Hyperlipidemia 2015    Hypertension 2015    Insomnia 2015    Morbid obesity 03/05/2025    Ulcerative colitis      Past Surgical History:   Procedure Laterality Date    BRONCHOSCOPY WITH FLUOROSCOPY Left 11/20/2023    Procedure: BRONCHOSCOPY, WITH FLUOROSCOPY;  Surgeon: Lisa Villarreal MD;  Location: [facility] ENDO;  Service: Pulmonary;  Laterality: Left;    BRONCHOSCOPY WITH FLUOROSCOPY N/A 11/30/2023    Procedure: BRONCHOSCOPY, WITH FLUOROSCOPY;  Surgeon: Lisa Villarreal MD;  Location: [facility] ENDO;  Service: Pulmonary;  Laterality: N/A;    CARDIAC ELECTROPHYSIOLOGY MAPPING AND ABLATION  2017    SVT    CHOLECYSTECTOMY  2011    COLONOSCOPY N/A 5/3/2022    Procedure: COLONOSCOPY;  Surgeon: Shan ONEILL  Ted MENDOZA MD;  Location: [facility];  Service: Endoscopy;  Laterality: N/A;    COLONOSCOPY N/A 3/16/2024    Procedure: COLONOSCOPY;  Surgeon: ALEKSANDER Ramos MD;  Location: [facility] ENDO;  Service: Endoscopy;  Laterality: N/A;    COLONOSCOPY N/A 1/17/2025    Procedure: COLONOSCOPY;  Surgeon: Russ Rowe MD;  Location: [facility] ENDO;  Service: Endoscopy;  Laterality: N/A;    COLONOSCOPY WITH FECAL MICROBIOTA TRANSFER N/A 3/21/2023    Procedure: COLONOSCOPY, WITH FECAL MICROBIOTA TRANSFER;  Surgeon: David Millan MD;  Location: [facility];  Service: Endoscopy;  Laterality: N/A;    ESOPHAGOGASTRODUODENOSCOPY N/A 4/24/2023    Procedure: EGD (ESOPHAGOGASTRODUODENOSCOPY);  Surgeon: Shan Jean III, MD;  Location: [facility];  Service: Endoscopy;  Laterality: N/A;    ESOPHAGOGASTRODUODENOSCOPY N/A 6/5/2024    Procedure: EGD (ESOPHAGOGASTRODUODENOSCOPY);  Surgeon: Odalis Mccabe MD;  Location: [facility];  Service: Endoscopy;  Laterality: N/A;    FLEXIBLE SIGMOIDOSCOPY N/A 6/5/2024    Procedure: SIGMOIDOSCOPY, FLEXIBLE;  Surgeon: Odalis Mccabe MD;  Location: [facility];  Service: Endoscopy;  Laterality: N/A;    FLEXIBLE SIGMOIDOSCOPY N/A 7/16/2024    Procedure: SIGMOIDOSCOPY, FLEXIBLE;  Surgeon: Shan Jean III, MD;  Location: [facility];  Service: Endoscopy;  Laterality: N/A;    FLEXIBLE SIGMOIDOSCOPY N/A 8/13/2024    Procedure: SIGMOIDOSCOPY, FLEXIBLE;  Surgeon: Shan Jean III, MD;  Location: [facility];  Service: Endoscopy;  Laterality: N/A;    GANGLION CYST EXCISION Left 1992    MAGNETIC RESONANCE IMAGING N/A 3/7/2025    Procedure: MRI (MAGNETIC RESONANCE IMAGING);  Surgeon: Provider, [facility];  Location: [facility];  Service: General;  Laterality: N/A;    UMBILICAL HERNIA REPAIR  2011    with mesh     Family History   Problem Relation Name Age of Onset    Asthma Mother       Social History[1]  Review of Systems   Gastrointestinal:  Positive for abdominal pain,  diarrhea, nausea and vomiting.   Musculoskeletal:  Positive for back pain.   All other systems reviewed and are negative.      Physical Exam     Initial Vitals [03/23/25 0350]   BP Pulse Resp Temp SpO2   (!) 146/85 98 (!) 22 97.7 °F (36.5 °C) 100 %      MAP       --         Physical Exam    Nursing note and vitals reviewed.  Constitutional: He appears well-developed and well-nourished. He is not diaphoretic. No distress.   HENT:   Head: Normocephalic and atraumatic.   Eyes: Conjunctivae are normal.   Neck: Neck supple.   Normal range of motion.  Cardiovascular:  Normal rate.           Pulmonary/Chest: No respiratory distress.   Abdominal: Abdomen is soft. He exhibits no distension. There is abdominal tenderness. There is no rebound and no guarding.   Musculoskeletal:         General: No edema.      Cervical back: Normal range of motion and neck supple.     Neurological: He is alert. He has normal strength.   Skin: Skin is warm and dry. No rash noted. No erythema.   Psychiatric: He has a normal mood and affect.         ED Course   Procedures  Labs Reviewed   COMPREHENSIVE METABOLIC PANEL - Abnormal       Result Value    Sodium 140      Potassium 4.0      Chloride 107      CO2 26      Glucose 131 (*)     BUN 32 (*)     Creatinine 1.2      Calcium 9.0      Protein Total 6.2      Albumin 3.6      Bilirubin Total 0.2      ALP 93      AST 10      ALT 20      Anion Gap 7 (*)     eGFR >60     CBC WITH DIFFERENTIAL - Abnormal    WBC 16.83 (*)     RBC 3.25 (*)     Hgb 8.8 (*)     Hct 29.8 (*)     MCV 92      MCH 27.1      MCHC 29.5 (*)     RDW 19.0 (*)     Platelet Count 290      MPV 10.6      Nucleated RBC 0      Neut % 80.7 (*)     Lymph % 10.1 (*)     Mono % 7.4      Eos % 0.4      Basophil % 0.2      Imm Grans % 1.2 (*)     Neut # 13.6 (*)     Lymph # 1.70      Mono # 1.24 (*)     Eos # 0.07      Baso # 0.03      Imm Grans # 0.21 (*)    SEDIMENTATION RATE - Abnormal    Sed Rate 36 (*)    C-REACTIVE PROTEIN - Abnormal     CRP 1.20 (*)    LIPASE - Normal    Lipase Level 10     CBC W/ AUTO DIFFERENTIAL    Narrative:     The following orders were created for panel order CBC auto differential.  Procedure                               Abnormality         Status                     ---------                               -----------         ------                     CBC with Differential[[id number]]       Abnormal            Final result                 Please view results for these tests on the individual orders.   POCT GLUCOSE, HAND-HELD DEVICE          Imaging Results              CT Abdomen Pelvis With IV Contrast NO Oral Contrast (Final result)  Result time 03/23/25 07:01:14      Final result by Harris Woodward MD (03/23/25 07:01:14)                   Impression:      1. Findings in keeping with nonspecific infectious or noninfectious inflammatory colitis involving the descending and sigmoid colon.  No evidence of perforation or abscess formation at the present time.  As suggested previously, given persistence over time of this finding, recommend colonoscopy to exclude the possibility of underlying mass lesion.  2. Relative to recent CT imaging of 03/15/2025, no significant change in the appearance of involving fracture of the T12 and L1 vertebra, better assessed on 03/02/2025 MRI.  3. Additional details, as provided in the body of the report.      Electronically signed by: Harris Woodward  Date:    03/23/2025  Time:    07:01               Narrative:    EXAMINATION:  CT ABDOMEN PELVIS WITH IV CONTRAST    CLINICAL HISTORY:  Abdominal pain, acute, nonlocalized;    TECHNIQUE:  Low dose axial images, sagittal and coronal reformations were obtained from the lung bases to the pubic symphysis following the IV administration of 100 mL of Omnipaque 350    COMPARISON:  CT of the abdomen and pelvis performed 03/15/2025.    FINDINGS:  Lower chest: Atelectasis and/or scar at the visualized lung bases.    Liver: Unremarkable.    Gallbladder and  bile ducts: Status post cholecystectomy.    Pancreas: Unremarkable.    Spleen: Unremarkable.    Adrenals: Unremarkable.    Kidneys: Mild cortical atrophy of both kidneys suggested.  Bilateral nonobstructing renal calculi are noted, with larger stone burden on the left.  Largest stone on the right measures up to 5 mm within upper to midpole calyx and largest on the left measures up to 7 mm within a lower pole calyx.    Lymph nodes: No abdominal or pelvic lymphadenopathy.    Bowel and mesentery: No evidence of bowel obstruction.  Suspected duodenal diverticulum.  Wall thickening and surrounding inflammatory changes are noted about the descending colon and proximal sigmoid colon.Normal appendix.    Abdominal aorta: Nonaneurysmal.  Mild atherosclerotic calcification.    Inferior vena cava: Unremarkable.    Free fluid or free air: None.    Pelvis: Peripherally calcified lesion in the anterior lower abdomen/pelvis is again noted, nonspecific, possibly reflecting sequela remote infectious/inflammatory process.    Body wall: Rectus diastasis.  Remote operative sequela suggested in the anterior abdominal wall.    Bones: No acute change.  As before, there is curvilinear air involving the superior endplate of the L1 vertebral body, corresponding to fracture identified on 03/02/2025 MRI.  Subtle fracture involving the T12 vertebra on that examination not well characterized by current technique.  Vacuum disc phenomena additionally noted at the T11-T12 disc.                                       Medications   busPIRone tablet 15 mg (15 mg Oral Given 3/23/25 2036)   diazePAM tablet 5 mg (has no administration in time range)   EScitalopram oxalate tablet 20 mg (20 mg Oral Given 3/23/25 0918)   methocarbamoL tablet 750 mg (750 mg Oral Given 3/23/25 2222)   metoprolol succinate (TOPROL-XL) 24 hr tablet 50 mg (50 mg Oral Given 3/23/25 0918)   pantoprazole EC tablet 40 mg (40 mg Oral Given 3/23/25 1700)   pregabalin capsule 50 mg (50  mg Oral Given 3/23/25 2036)   atorvastatin tablet 10 mg (10 mg Oral Given 3/23/25 0918)   predniSONE tablet 5 mg (5 mg Oral Given 3/23/25 0918)   sodium chloride 0.9% flush 10 mL (has no administration in time range)   acetaminophen tablet 650 mg (has no administration in time range)   naloxone 0.4 mg/mL injection 0.02 mg (has no administration in time range)   potassium bicarbonate disintegrating tablet 50 mEq (has no administration in time range)   potassium bicarbonate disintegrating tablet 35 mEq (has no administration in time range)   potassium bicarbonate disintegrating tablet 60 mEq (has no administration in time range)   potassium, sodium phosphates 280-160-250 mg packet 2 packet (has no administration in time range)   potassium, sodium phosphates 280-160-250 mg packet 2 packet (has no administration in time range)   potassium, sodium phosphates 280-160-250 mg packet 2 packet (has no administration in time range)   glucose chewable tablet 16 g (has no administration in time range)   glucose chewable tablet 24 g (has no administration in time range)   dextrose 50% injection 12.5 g (has no administration in time range)   dextrose 50% injection 25 g (has no administration in time range)   glucagon (human recombinant) injection 1 mg (has no administration in time range)   ondansetron injection 4 mg (4 mg Intravenous Given 3/23/25 1552)   oxyCODONE immediate release tablet 5 mg (5 mg Oral Given 3/23/25 2110)   insulin aspart U-100 pen 0-10 Units (has no administration in time range)   enoxaparin injection 40 mg (40 mg Subcutaneous Given 3/23/25 2036)   HYDROmorphone injection 0.5 mg (0.5 mg Intravenous Given 3/23/25 1552)   zolpidem tablet 5 mg (5 mg Oral Given 3/23/25 2222)   HYDROmorphone injection 1 mg (1 mg Intravenous Given 3/23/25 0547)   ondansetron injection 4 mg (4 mg Intravenous Given 3/23/25 0546)   iohexoL (OMNIPAQUE 350) injection 100 mL (100 mLs Intravenous Given 3/23/25 06)     Medical Decision  Making  50-year-old with ulcerative colitis and subacute compression fractures complaining of acute vomiting diarrhea and abdominal pain as well as uncontrolled low back pain.  I have ordered labs, pain and nausea medication, CT abdomen.  Care will be turned over to the oncoming physician.    Amount and/or Complexity of Data Reviewed  Labs: ordered.  Radiology: ordered. Decision-making details documented in ED Course.    Risk  Prescription drug management.               ED Course as of 03/24/25 0001   Sun Mar 23, 2025   0602 S/o from dr munoz, pending imaging [EF]   0603 Seen by physician, reports typical pain with ulcerative colitis flare-up.  Reports back pain, recent fracture.  Recently admitted for 8 days.  Labs and imaging are pending at this time. [EF]   0705 CT Abdomen Pelvis With IV Contrast NO Oral Contrast [EF]   0801 Dr jarvis to admit [EF]      ED Course User Index  [EF] Marcelino Garcia MD                           Clinical Impression:  Final diagnoses:  [R11.2, R19.7] Nausea vomiting and diarrhea (Primary)  [M54.50, G89.29] Chronic low back pain, unspecified back pain laterality, unspecified whether sciatica present  [K52.9] Colitis          ED Disposition Condition    Observation Stable                  [1]   Social History  Tobacco Use    Smoking status: Former     Average packs/day: 1 pack/day for 30.0 years (30.0 ttl pk-yrs)     Types: Cigarettes     Start date: 1993    Smokeless tobacco: Never    Tobacco comments:     Pt states he has stopped smoking with Chantix three weeks ago. 12/1/23   Substance Use Topics    Alcohol use: Yes     Comment: ocass    Drug use: Not Currently        Cheo Munoz MD  03/24/25 0001

## 2025-03-23 NOTE — PLAN OF CARE
[facility] - Emergency Dept  Initial Discharge Assessment       Primary Care Provider: Hunter Aguilar III, MD    Admission Diagnosis: Colitis [K52.9]    Admission Date: 3/23/2025  Expected Discharge Date: 3/24/2025    Transition of Care Barriers: None    Listed address is mailing. Pt lives at [address] [place]. PCP is DR. Aguilar, last apt was about a month ago. Pharmacy is [facility] in [place] (near [facility]). Denies hh/hd/blood thinners/outpt services. DME- TLSO brace.  Verified insurance on file.     Payor: [employer] / Plan: [employer] ([employer]) / Product Type: Commercial /     Extended Emergency Contact Information  Primary Emergency Contact: Estiven (Sister-In-Law)Jessica  Address: [address] [place] [address], [place] [address] [facility]  Home Phone: [phone]  Mobile Phone: [phone]  Relation: Sister  Preferred language: English   needed? No  Secondary Emergency Contact: Dallas Jean-Batpiste   [place]  Mobile Phone: [phone]  Relation: Brother  Preferred language: English   needed? No    Discharge Plan A: Home Health  Discharge Plan B: Home Health      [id number] - [place], [place] - [address] AT [address] [facility]  [address] [facility] [address]  [address]  [facility] [address]  Phone: [phone] Fax: [phone]      Initial Assessment (most recent)       Adult Discharge Assessment - 03/23/25 1347          Discharge Assessment    Assessment Type Discharge Planning Assessment     Confirmed/corrected address, phone number and insurance Yes     Confirmed Demographics Correct on Facesheet     Source of Information patient;health record     Does patient/caregiver understand observation status Yes     Communicated GERARDO with patient/caregiver Date not available/Unable to determine     Reason For Admission colitis     People in Home alone     Do you expect to return to your current living  situation? Yes     Do you have help at home or someone to help you manage your care at home? Yes     Who are your caregiver(s) and their phone number(s)? HUSSEIN Lopez ([phone])     Prior to hospitilization cognitive status: Alert/Oriented     Current cognitive status: Alert/Oriented     Walking or Climbing Stairs Difficulty no     Dressing/Bathing Difficulty no     Equipment Currently Used at Home none     Readmission within 30 days? Yes     Patient currently being followed by outpatient case management? Unable to determine (comments)     Do you currently have service(s) that help you manage your care at home? Yes     Is the pt/caregiver preference to resume services with current agency Yes     Do you take prescription medications? Yes     Do you have prescription coverage? Yes     Coverage [employer]     Do you have any problems affording any of your prescribed medications? No     Is the patient taking medications as prescribed? yes     Who is going to help you get home at discharge? family     How do you get to doctors appointments? car, drives self;family or friend will provide     Are you on dialysis? No     Do you take coumadin? No     Discharge Plan A Home Health     Discharge Plan B Home Health     DME Needed Upon Discharge  none     Discharge Plan discussed with: Patient     Transition of Care Barriers None

## 2025-03-23 NOTE — ASSESSMENT & PLAN NOTE
-TLSO brace  -Fall precautions  -PRN analgesics; wean IV PRN medications; patient counseled on development of tolerance with possible dependence of opiates/opioids

## 2025-03-23 NOTE — NURSING
Pt arrived on the unit via stretcher. Pt was able to ambulate, step on scale and proceed to the bed. VS and initial assessment done. Pt oriented to care setting, call button within reach
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
## 2025-03-23 NOTE — ASSESSMENT & PLAN NOTE
Patient's blood pressure range in the last 24 hours was: BP  Min: 97/71  Max: 146/85.The patient's inpatient anti-hypertensive regimen is listed below:  Current Antihypertensives  metoprolol succinate (TOPROL-XL) 24 hr tablet 50 mg, Daily, Oral    Plan  - BP is controlled, no changes needed to their regimen

## 2025-03-23 NOTE — ASSESSMENT & PLAN NOTE
Body mass index is 45.68 kg/m². Morbid obesity complicates all aspects of disease management from diagnostic modalities to treatment.

## 2025-03-23 NOTE — PROGRESS NOTES
Pharmacist Renal Dose Adjustment Note    Jacoby Jean-Baptiste is a 50 y.o. male being treated with the medication Enoxaparin.    Patient Data:    Vital Signs (Most Recent):  Temp: 97.7 °F (36.5 °C) (03/23/25 0350)  Pulse: 80 (03/23/25 0830)  Resp: 13 (03/23/25 0830)  BP: 137/77 (03/23/25 0830)  SpO2: 95 % (03/23/25 0830) Vital Signs (72h Range):  Temp:  [97.7 °F (36.5 °C)]   Pulse:  [80-98]   Resp:  [12-22]   BP: ()/(56-95)   SpO2:  [94 %-100 %]      Recent Labs   Lab 03/17/25  0457 03/23/25  0542   CREATININE 1.3 1.2     Serum creatinine: 1.2 mg/dL 03/23/25 0542  Estimated creatinine clearance: 92.3 mL/min    Enoxaparin 40 mg subq every 24 hours will be changed to Enoxaparin 40 mg subq every 12 hours due to BMI > 40 kg/m2.    Pharmacist's Name: Joanna Husain  Pharmacist's Extension: [phone]

## 2025-03-23 NOTE — ASSESSMENT & PLAN NOTE
"Patient's FSGs are controlled on current medication regimen.  Last A1c reviewed-   Lab Results   Component Value Date    HGBA1C 5.3 03/05/2025     Most recent fingerstick glucose reviewed- No results for input(s): "POCTGLUCOSE" in the last 24 hours.  Current correctional scale  Medium  Maintain anti-hyperglycemic dose as follows-   Antihyperglycemics (From admission, onward)      Start     Stop Route Frequency Ordered    03/23/25 0951  insulin aspart U-100 pen 0-10 Units         -- SubQ Before meals & nightly PRN 03/23/25 0853          Hold Oral hypoglycemics while patient is in the hospital.     "

## 2025-03-24 VITALS
TEMPERATURE: 98 F | BODY MASS INDEX: 46.38 KG/M2 | WEIGHT: 288.56 LBS | SYSTOLIC BLOOD PRESSURE: 109 MMHG | HEIGHT: 66 IN | DIASTOLIC BLOOD PRESSURE: 61 MMHG | RESPIRATION RATE: 16 BRPM | OXYGEN SATURATION: 96 % | HEART RATE: 76 BPM

## 2025-03-24 LAB
ABSOLUTE EOSINOPHIL (SMH): 0.08 K/UL
ABSOLUTE MONOCYTE (SMH): 0.86 K/UL (ref 0.3–1)
ABSOLUTE NEUTROPHIL COUNT (SMH): 9.3 K/UL (ref 1.8–7.7)
ANION GAP (SMH): 6 MMOL/L (ref 8–16)
BASOPHILS # BLD AUTO: 0.07 K/UL
BASOPHILS NFR BLD AUTO: 0.6 %
BUN SERPL-MCNC: 24 MG/DL (ref 6–20)
CALCIUM SERPL-MCNC: 8.7 MG/DL (ref 8.7–10.5)
CHLORIDE SERPL-SCNC: 107 MMOL/L (ref 95–110)
CO2 SERPL-SCNC: 26 MMOL/L (ref 23–29)
CREAT SERPL-MCNC: 1 MG/DL (ref 0.5–1.4)
ERYTHROCYTE [DISTWIDTH] IN BLOOD BY AUTOMATED COUNT: 18.7 % (ref 11.5–14.5)
GFR SERPLBLD CREATININE-BSD FMLA CKD-EPI: >60 ML/MIN/1.73/M2
GLUCOSE SERPL-MCNC: 93 MG/DL (ref 70–110)
HCT VFR BLD AUTO: 29.9 % (ref 40–54)
HGB BLD-MCNC: 8.9 GM/DL (ref 14–18)
IMM GRANULOCYTES # BLD AUTO: 0.13 K/UL (ref 0–0.04)
IMM GRANULOCYTES NFR BLD AUTO: 1.1 % (ref 0–0.5)
LYMPHOCYTES # BLD AUTO: 1.83 K/UL (ref 1–4.8)
MCH RBC QN AUTO: 27.2 PG (ref 27–31)
MCHC RBC AUTO-ENTMCNC: 29.8 G/DL (ref 32–36)
MCV RBC AUTO: 91 FL (ref 82–98)
NUCLEATED RBC (/100WBC) (SMH): 0 /100 WBC
PLATELET # BLD AUTO: 239 K/UL (ref 150–450)
PMV BLD AUTO: 10.6 FL (ref 9.2–12.9)
POCT GLUCOSE: 85 MG/DL (ref 70–110)
POTASSIUM SERPL-SCNC: 4.3 MMOL/L (ref 3.5–5.1)
RBC # BLD AUTO: 3.27 M/UL (ref 4.6–6.2)
RELATIVE EOSINOPHIL (SMH): 0.7 % (ref 0–8)
RELATIVE LYMPHOCYTE (SMH): 14.9 % (ref 18–48)
RELATIVE MONOCYTE (SMH): 7 % (ref 4–15)
RELATIVE NEUTROPHIL (SMH): 75.7 % (ref 38–73)
SODIUM SERPL-SCNC: 139 MMOL/L (ref 136–145)
WBC # BLD AUTO: 12.28 K/UL (ref 3.9–12.7)

## 2025-03-24 PROCEDURE — 85025 COMPLETE CBC W/AUTO DIFF WBC: CPT | Performed by: STUDENT IN AN ORGANIZED HEALTH CARE EDUCATION/TRAINING PROGRAM

## 2025-03-24 PROCEDURE — 36415 COLL VENOUS BLD VENIPUNCTURE: CPT | Performed by: STUDENT IN AN ORGANIZED HEALTH CARE EDUCATION/TRAINING PROGRAM

## 2025-03-24 PROCEDURE — 80048 BASIC METABOLIC PNL TOTAL CA: CPT | Performed by: STUDENT IN AN ORGANIZED HEALTH CARE EDUCATION/TRAINING PROGRAM

## 2025-03-24 PROCEDURE — 25000003 PHARM REV CODE 250: Performed by: STUDENT IN AN ORGANIZED HEALTH CARE EDUCATION/TRAINING PROGRAM

## 2025-03-24 PROCEDURE — 63600175 PHARM REV CODE 636 W HCPCS: Mod: TB,JZ | Performed by: STUDENT IN AN ORGANIZED HEALTH CARE EDUCATION/TRAINING PROGRAM

## 2025-03-24 PROCEDURE — 96372 THER/PROPH/DIAG INJ SC/IM: CPT | Performed by: STUDENT IN AN ORGANIZED HEALTH CARE EDUCATION/TRAINING PROGRAM

## 2025-03-24 PROCEDURE — 96376 TX/PRO/DX INJ SAME DRUG ADON: CPT

## 2025-03-24 PROCEDURE — G0378 HOSPITAL OBSERVATION PER HR: HCPCS

## 2025-03-24 RX ORDER — PREDNISONE 10 MG/1
10 TABLET ORAL DAILY
Qty: 30 TABLET | Refills: 2 | Status: SHIPPED | OUTPATIENT
Start: 2025-03-25

## 2025-03-24 RX ADMIN — BUSPIRONE HYDROCHLORIDE 15 MG: 5 TABLET ORAL at 08:03

## 2025-03-24 RX ADMIN — ENOXAPARIN SODIUM 40 MG: 40 INJECTION SUBCUTANEOUS at 08:03

## 2025-03-24 RX ADMIN — PANTOPRAZOLE SODIUM 40 MG: 40 TABLET, DELAYED RELEASE ORAL at 05:03

## 2025-03-24 RX ADMIN — PREGABALIN 50 MG: 25 CAPSULE ORAL at 08:03

## 2025-03-24 RX ADMIN — METOPROLOL SUCCINATE 50 MG: 50 TABLET, EXTENDED RELEASE ORAL at 08:03

## 2025-03-24 RX ADMIN — PREDNISONE 5 MG: 5 TABLET ORAL at 08:03

## 2025-03-24 RX ADMIN — ATORVASTATIN CALCIUM 10 MG: 10 TABLET, FILM COATED ORAL at 08:03

## 2025-03-24 RX ADMIN — HYDROMORPHONE HYDROCHLORIDE 0.5 MG: 1 INJECTION, SOLUTION INTRAMUSCULAR; INTRAVENOUS; SUBCUTANEOUS at 08:03

## 2025-03-24 RX ADMIN — ACETAMINOPHEN 650 MG: 325 TABLET ORAL at 11:03

## 2025-03-24 RX ADMIN — ESCITALOPRAM OXALATE 20 MG: 10 TABLET ORAL at 08:03

## 2025-03-24 RX ADMIN — HYDROMORPHONE HYDROCHLORIDE 0.5 MG: 1 INJECTION, SOLUTION INTRAMUSCULAR; INTRAVENOUS; SUBCUTANEOUS at 02:03

## 2025-03-24 RX ADMIN — OXYCODONE HYDROCHLORIDE 5 MG: 5 TABLET ORAL at 03:03

## 2025-03-24 NOTE — ASSESSMENT & PLAN NOTE
Patient's blood pressure range in the last 24 hours was: BP  Min: 92/54  Max: 134/83.The patient's inpatient anti-hypertensive regimen is listed below:  Current Antihypertensives  metoprolol succinate (TOPROL-XL) 24 hr tablet 50 mg, Daily, Oral    Plan  - BP is controlled, no changes needed to their regimen

## 2025-03-24 NOTE — PLAN OF CARE
SW met with patient at bedside about HH services. Patient reported having services with [facility].     SW spoke with [facility] to verify HH status. Patient was discharged from services in February 2025 due to wound healing. Patient was on services for wound care only.
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
## 2025-03-24 NOTE — ASSESSMENT & PLAN NOTE
Patient's FSGs are controlled on current medication regimen.  Last A1c reviewed-   Lab Results   Component Value Date    HGBA1C 5.3 03/05/2025     Most recent fingerstick glucose reviewed-   Recent Labs   Lab 03/23/25  1648 03/23/25 2022 03/24/25  0708   POCTGLUCOSE 127* 110 85     Current correctional scale  Medium  Maintain anti-hyperglycemic dose as follows-   Antihyperglycemics (From admission, onward)      Start     Stop Route Frequency Ordered    03/23/25 0951  insulin aspart U-100 pen 0-10 Units         -- SubQ Before meals & nightly PRN 03/23/25 0853          Hold Oral hypoglycemics while patient is in the hospital.

## 2025-03-24 NOTE — NURSING
Per handoff received from Hanna MOORE RN. Patient care assumed. Patients overall condition assessed and patient appears to be in NAD with no complaints of pain. 20g RFA PIV is clean, dry, and intact. Call light in reach and patient instructed to inform the nurse if anything is needed. Patient stable and is continually being monitored.
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
## 2025-03-24 NOTE — PLAN OF CARE
Problem: Adult Inpatient Plan of Care  Goal: Plan of Care Review  Flowsheets (Taken 3/24/2025 0251)  Plan of Care Reviewed With: patient  Goal: Absence of Hospital-Acquired Illness or Injury  Intervention: Identify and Manage Fall Risk  Flowsheets (Taken 3/24/2025 0251)  Safety Promotion/Fall Prevention:   Fall Risk reviewed with patient/family   lighting adjusted   medications reviewed   room near unit station   side rails raised x 2  Intervention: Prevent Skin Injury  Flowsheets (Taken 3/24/2025 0251)  Body Position: position changed independently  Intervention: Prevent and Manage VTE (Venous Thromboembolism) Risk  Flowsheets (Taken 3/24/2025 0251)  VTE Prevention/Management: ROM (active) performed  Goal: Optimal Comfort and Wellbeing  Intervention: Monitor Pain and Promote Comfort  Flowsheets (Taken 3/24/2025 0251)  Pain Management Interventions:   pain management plan reviewed with patient/caregiver   relaxation techniques promoted   around-the-clock dosing utilized  Intervention: Provide Person-Centered Care  Flowsheets (Taken 3/24/2025 0251)  Trust Relationship/Rapport:   care explained   choices provided   questions answered   questions encouraged   reassurance provided   thoughts/feelings acknowledged     Problem: Fall Injury Risk  Goal: Absence of Fall and Fall-Related Injury  Intervention: Identify and Manage Contributors  Flowsheets (Taken 3/24/2025 0251)  Self-Care Promotion:   independence encouraged   BADL personal objects within reach  Medication Review/Management: medications reviewed  Intervention: Promote Injury-Free Environment  Flowsheets (Taken 3/24/2025 0251)  Safety Promotion/Fall Prevention:   Fall Risk reviewed with patient/family   lighting adjusted   medications reviewed   room near unit station   side rails raised x 2     Problem: Diabetes Comorbidity  Goal: Blood Glucose Level Within Targeted Range  Intervention: Monitor and Manage Glycemia  Flowsheets (Taken 3/24/2025 0251)  Glycemic  Management: blood glucose monitored     Problem: Pain Acute  Goal: Optimal Pain Control and Function  Intervention: Develop Pain Management Plan  Flowsheets (Taken 3/24/2025 0251)  Pain Management Interventions:   pain management plan reviewed with patient/caregiver   relaxation techniques promoted   around-the-clock dosing utilized  Intervention: Prevent or Manage Pain  Flowsheets (Taken 3/24/2025 0251)  Sleep/Rest Enhancement:   awakenings minimized   consistent schedule promoted   reading promoted   regular sleep/rest pattern promoted   relaxation techniques promoted  Medication Review/Management: medications reviewed  Intervention: Optimize Psychosocial Wellbeing  Flowsheets (Taken 3/24/2025 0251)  Supportive Measures: relaxation techniques promoted  Diversional Activities: television

## 2025-03-24 NOTE — PLAN OF CARE
Discharge orders and chart reviewed. No other discharge needs noted at this time. Pt is clear for discharge from case management, after seen by hospital medicine. Pt is discharging to home.    PCP follow up appointment scheduled and added to AVS    Family to transport     03/24/25 1007   Final Note   Assessment Type Final Discharge Note   Anticipated Discharge Disposition Home   What phone number can be called within the next 1-3 days to see how you are doing after discharge? [phone]   Hospital Resources/Appts/Education Provided Appointments scheduled and added to AVS   Post-Acute Status   Discharge Delays (!) Waiting for Provider to Speak to Patient

## 2025-03-24 NOTE — ASSESSMENT & PLAN NOTE
Body mass index is 46.58 kg/m². Morbid obesity complicates all aspects of disease management from diagnostic modalities to treatment.

## 2025-03-25 ENCOUNTER — PATIENT MESSAGE (OUTPATIENT)
Dept: FAMILY MEDICINE | Facility: CLINIC | Age: 50
End: 2025-03-25
Payer: COMMERCIAL

## 2025-05-11 ENCOUNTER — HOSPITAL ENCOUNTER (EMERGENCY)
Facility: HOSPITAL | Age: 50
Discharge: HOME | End: 2025-05-11
Attending: STUDENT IN AN ORGANIZED HEALTH CARE EDUCATION/TRAINING PROGRAM
Payer: COMMERCIAL

## 2025-05-11 VITALS
HEART RATE: 90 BPM | WEIGHT: 258 LBS | SYSTOLIC BLOOD PRESSURE: 131 MMHG | DIASTOLIC BLOOD PRESSURE: 63 MMHG | RESPIRATION RATE: 41 BRPM | TEMPERATURE: 98 F | HEIGHT: 66 IN | BODY MASS INDEX: 41.46 KG/M2 | OXYGEN SATURATION: 95 %

## 2025-05-11 DIAGNOSIS — M16.12 OSTEOARTHRITIS OF LEFT HIP, UNSPECIFIED OSTEOARTHRITIS TYPE: ICD-10-CM

## 2025-05-11 DIAGNOSIS — R06.02 SOB (SHORTNESS OF BREATH): Primary | ICD-10-CM

## 2025-05-11 DIAGNOSIS — M79.605 LEFT LEG PAIN: ICD-10-CM

## 2025-05-11 DIAGNOSIS — M25.552 LEFT HIP PAIN: ICD-10-CM

## 2025-05-11 DIAGNOSIS — N39.0 URINARY TRACT INFECTION WITHOUT HEMATURIA, SITE UNSPECIFIED: ICD-10-CM

## 2025-05-11 LAB
ABSOLUTE EOSINOPHIL (OHS): 0.01 K/UL
ABSOLUTE MONOCYTE (OHS): 0.34 K/UL (ref 0.3–1)
ABSOLUTE NEUTROPHIL COUNT (OHS): 13.55 K/UL (ref 1.8–7.7)
ALBUMIN SERPL BCP-MCNC: 2.6 G/DL (ref 3.5–5.2)
ALP SERPL-CCNC: 109 UNIT/L (ref 40–150)
ALT SERPL W/O P-5'-P-CCNC: 19 UNIT/L (ref 10–44)
AMORPH CRY URNS QL MICRO: ABNORMAL
ANION GAP (OHS): 12 MMOL/L (ref 8–16)
AST SERPL-CCNC: 13 UNIT/L (ref 11–45)
BACTERIA #/AREA URNS HPF: ABNORMAL /HPF
BASOPHILS # BLD AUTO: 0.02 K/UL
BASOPHILS NFR BLD AUTO: 0.1 %
BILIRUB SERPL-MCNC: 0.5 MG/DL (ref 0.1–1)
BILIRUB UR QL STRIP.AUTO: NEGATIVE
BNP SERPL-MCNC: 29 PG/ML (ref 0–99)
BUN SERPL-MCNC: 15 MG/DL (ref 6–20)
CALCIUM SERPL-MCNC: 9.2 MG/DL (ref 8.7–10.5)
CHLORIDE SERPL-SCNC: 100 MMOL/L (ref 95–110)
CLARITY UR: ABNORMAL
CO2 SERPL-SCNC: 25 MMOL/L (ref 23–29)
COLOR UR AUTO: YELLOW
CREAT SERPL-MCNC: 1.3 MG/DL (ref 0.5–1.4)
ERYTHROCYTE [DISTWIDTH] IN BLOOD BY AUTOMATED COUNT: 16.6 % (ref 11.5–14.5)
GFR SERPLBLD CREATININE-BSD FMLA CKD-EPI: >60 ML/MIN/1.73/M2
GLUCOSE SERPL-MCNC: 172 MG/DL (ref 70–110)
GLUCOSE UR QL STRIP: NEGATIVE
HCT VFR BLD AUTO: 32.1 % (ref 40–54)
HGB BLD-MCNC: 9.9 GM/DL (ref 14–18)
HGB UR QL STRIP: NEGATIVE
HOLD SPECIMEN: NORMAL
HYALINE CASTS #/AREA URNS LPF: 2 /LPF (ref 0–1)
IMM GRANULOCYTES # BLD AUTO: 0.07 K/UL (ref 0–0.04)
IMM GRANULOCYTES NFR BLD AUTO: 0.5 % (ref 0–0.5)
KETONES UR QL STRIP: NEGATIVE
LEUKOCYTE ESTERASE UR QL STRIP: ABNORMAL
LYMPHOCYTES # BLD AUTO: 0.63 K/UL (ref 1–4.8)
MCH RBC QN AUTO: 25.3 PG (ref 27–31)
MCHC RBC AUTO-ENTMCNC: 30.8 G/DL (ref 32–36)
MCV RBC AUTO: 82 FL (ref 82–98)
MICROSCOPIC COMMENT: ABNORMAL
NITRITE UR QL STRIP: NEGATIVE
NUCLEATED RBC (/100WBC) (OHS): 0 /100 WBC
PH UR STRIP: 7 [PH]
PLATELET # BLD AUTO: 269 K/UL (ref 150–450)
PMV BLD AUTO: 9.9 FL (ref 9.2–12.9)
POTASSIUM SERPL-SCNC: 4.7 MMOL/L (ref 3.5–5.1)
PROT SERPL-MCNC: 6.8 GM/DL (ref 6–8.4)
PROT UR QL STRIP: ABNORMAL
RBC # BLD AUTO: 3.92 M/UL (ref 4.6–6.2)
RBC #/AREA URNS HPF: 4 /HPF (ref 0–4)
RELATIVE EOSINOPHIL (OHS): 0.1 %
RELATIVE LYMPHOCYTE (OHS): 4.3 % (ref 18–48)
RELATIVE MONOCYTE (OHS): 2.3 % (ref 4–15)
RELATIVE NEUTROPHIL (OHS): 92.7 % (ref 38–73)
SODIUM SERPL-SCNC: 137 MMOL/L (ref 136–145)
SP GR UR STRIP: 1.02
SQUAMOUS #/AREA URNS HPF: 3 /HPF
TROPONIN I SERPL DL<=0.01 NG/ML-MCNC: <0.006 NG/ML
UROBILINOGEN UR STRIP-ACNC: NEGATIVE EU/DL
WBC # BLD AUTO: 14.62 K/UL (ref 3.9–12.7)
WBC #/AREA URNS HPF: 12 /HPF (ref 0–5)

## 2025-05-11 PROCEDURE — 80053 COMPREHEN METABOLIC PANEL: CPT | Performed by: NURSE PRACTITIONER

## 2025-05-11 PROCEDURE — 93005 ELECTROCARDIOGRAM TRACING: CPT | Performed by: INTERNAL MEDICINE

## 2025-05-11 PROCEDURE — 96374 THER/PROPH/DIAG INJ IV PUSH: CPT

## 2025-05-11 PROCEDURE — 25500020 PHARM REV CODE 255: Performed by: EMERGENCY MEDICINE

## 2025-05-11 PROCEDURE — 63600175 PHARM REV CODE 636 W HCPCS: Performed by: NURSE PRACTITIONER

## 2025-05-11 PROCEDURE — 73502 X-RAY EXAM HIP UNI 2-3 VIEWS: CPT | Mod: 26,LT,, | Performed by: RADIOLOGY

## 2025-05-11 PROCEDURE — 93971 EXTREMITY STUDY: CPT | Mod: 26,LT,, | Performed by: RADIOLOGY

## 2025-05-11 PROCEDURE — 84484 ASSAY OF TROPONIN QUANT: CPT | Performed by: NURSE PRACTITIONER

## 2025-05-11 PROCEDURE — 93971 EXTREMITY STUDY: CPT | Mod: TC,LT

## 2025-05-11 PROCEDURE — 81003 URINALYSIS AUTO W/O SCOPE: CPT | Performed by: NURSE PRACTITIONER

## 2025-05-11 PROCEDURE — 94640 AIRWAY INHALATION TREATMENT: CPT

## 2025-05-11 PROCEDURE — 94799 UNLISTED PULMONARY SVC/PX: CPT

## 2025-05-11 PROCEDURE — 71046 X-RAY EXAM CHEST 2 VIEWS: CPT | Mod: TC

## 2025-05-11 PROCEDURE — 25000003 PHARM REV CODE 250: Performed by: NURSE PRACTITIONER

## 2025-05-11 PROCEDURE — 99285 EMERGENCY DEPT VISIT HI MDM: CPT | Mod: 25

## 2025-05-11 PROCEDURE — 71275 CT ANGIOGRAPHY CHEST: CPT | Mod: TC

## 2025-05-11 PROCEDURE — 96361 HYDRATE IV INFUSION ADD-ON: CPT

## 2025-05-11 PROCEDURE — 87086 URINE CULTURE/COLONY COUNT: CPT | Performed by: NURSE PRACTITIONER

## 2025-05-11 PROCEDURE — 85025 COMPLETE CBC W/AUTO DIFF WBC: CPT | Performed by: NURSE PRACTITIONER

## 2025-05-11 PROCEDURE — 25000242 PHARM REV CODE 250 ALT 637 W/ HCPCS: Performed by: NURSE PRACTITIONER

## 2025-05-11 PROCEDURE — 71046 X-RAY EXAM CHEST 2 VIEWS: CPT | Mod: 26,,, | Performed by: RADIOLOGY

## 2025-05-11 PROCEDURE — 83880 ASSAY OF NATRIURETIC PEPTIDE: CPT | Performed by: NURSE PRACTITIONER

## 2025-05-11 PROCEDURE — 93010 ELECTROCARDIOGRAM REPORT: CPT | Mod: ,,, | Performed by: INTERNAL MEDICINE

## 2025-05-11 PROCEDURE — 73502 X-RAY EXAM HIP UNI 2-3 VIEWS: CPT | Mod: TC,LT

## 2025-05-11 RX ORDER — PROMETHAZINE HYDROCHLORIDE 25 MG/1
25 TABLET ORAL 4 TIMES DAILY PRN
COMMUNITY

## 2025-05-11 RX ORDER — OXYCODONE AND ACETAMINOPHEN 10; 325 MG/1; MG/1
1 TABLET ORAL EVERY 4 HOURS PRN
COMMUNITY
Start: 2025-03-26

## 2025-05-11 RX ORDER — CHOLESTYRAMINE 4 G/9G
POWDER, FOR SUSPENSION ORAL
COMMUNITY
Start: 2025-04-23

## 2025-05-11 RX ORDER — APIXABAN 5 MG/1
5 TABLET, FILM COATED ORAL 2 TIMES DAILY
COMMUNITY

## 2025-05-11 RX ORDER — CEFDINIR 300 MG/1
300 CAPSULE ORAL 2 TIMES DAILY
Qty: 14 CAPSULE | Refills: 0 | Status: SHIPPED | OUTPATIENT
Start: 2025-05-11 | End: 2025-05-18

## 2025-05-11 RX ORDER — GUSELKUMAB 200 MG/2ML
3 INJECTION SUBCUTANEOUS
COMMUNITY
Start: 2025-02-07

## 2025-05-11 RX ORDER — BEZLOTOXUMAB 25 MG/ML
INJECTION, SOLUTION INTRAVENOUS
COMMUNITY
Start: 2025-01-03

## 2025-05-11 RX ORDER — LACTOBACIL 2/BIFIDO 1/S.THERMO 450B CELL
1 PACKET (EA) ORAL
COMMUNITY
Start: 2025-01-06

## 2025-05-11 RX ORDER — HYDROCODONE BITARTRATE AND ACETAMINOPHEN 5; 325 MG/1; MG/1
60 TABLET ORAL
COMMUNITY
Start: 2025-01-29

## 2025-05-11 RX ORDER — METHOCARBAMOL 750 MG/1
750 TABLET, FILM COATED ORAL 3 TIMES DAILY
COMMUNITY
Start: 2025-04-09

## 2025-05-11 RX ORDER — GABAPENTIN 100 MG/1
100 CAPSULE ORAL 3 TIMES DAILY
COMMUNITY

## 2025-05-11 RX ORDER — ALBUTEROL SULFATE 90 UG/1
1-2 INHALANT RESPIRATORY (INHALATION) EVERY 6 HOURS PRN
Qty: 6.7 G | Refills: 0 | Status: SHIPPED | OUTPATIENT
Start: 2025-05-11 | End: 2026-05-11

## 2025-05-11 RX ORDER — HYOSCYAMINE SULFATE 0.38 MG/1
0.38 TABLET, EXTENDED RELEASE ORAL 2 TIMES DAILY
COMMUNITY
Start: 2025-05-07

## 2025-05-11 RX ORDER — METFORMIN HYDROCHLORIDE 500 MG/1
TABLET, EXTENDED RELEASE ORAL
COMMUNITY

## 2025-05-11 RX ORDER — IPRATROPIUM BROMIDE AND ALBUTEROL SULFATE 2.5; .5 MG/3ML; MG/3ML
3 SOLUTION RESPIRATORY (INHALATION)
Status: COMPLETED | OUTPATIENT
Start: 2025-05-11 | End: 2025-05-11

## 2025-05-11 RX ORDER — LIDOCAINE 50 MG/G
2 PATCH TOPICAL
COMMUNITY
Start: 2025-04-09

## 2025-05-11 RX ORDER — SEMAGLUTIDE 1.34 MG/ML
INJECTION, SOLUTION SUBCUTANEOUS
COMMUNITY

## 2025-05-11 RX ORDER — CYCLOBENZAPRINE HCL 10 MG
10 TABLET ORAL 3 TIMES DAILY
COMMUNITY
Start: 2025-04-09

## 2025-05-11 RX ORDER — LANOLIN ALCOHOL/MO/W.PET/CERES
1 CREAM (GRAM) TOPICAL
COMMUNITY
Start: 2024-12-24

## 2025-05-11 RX ORDER — BUDESONIDE 2 MG/1
AEROSOL, FOAM RECTAL
COMMUNITY
Start: 2024-09-11

## 2025-05-11 RX ORDER — MIRIKIZUMAB-MRKZ 100 MG/ML
2 INJECTION, SOLUTION SUBCUTANEOUS
COMMUNITY
Start: 2024-06-18

## 2025-05-11 RX ORDER — BUDESONIDE 3 MG/1
9 CAPSULE, COATED PELLETS ORAL
COMMUNITY

## 2025-05-11 RX ORDER — MORPHINE SULFATE 2 MG/ML
4 INJECTION, SOLUTION INTRAMUSCULAR; INTRAVENOUS
Refills: 0 | Status: COMPLETED | OUTPATIENT
Start: 2025-05-11 | End: 2025-05-11

## 2025-05-11 RX ORDER — FIDAXOMICIN 200 MG/1
20 TABLET, FILM COATED ORAL
COMMUNITY
Start: 2025-01-03

## 2025-05-11 RX ADMIN — IPRATROPIUM BROMIDE AND ALBUTEROL SULFATE 3 ML: .5; 3 SOLUTION RESPIRATORY (INHALATION) at 10:05

## 2025-05-11 RX ADMIN — MORPHINE SULFATE 4 MG: 2 INJECTION, SOLUTION INTRAMUSCULAR; INTRAVENOUS at 06:05

## 2025-05-11 RX ADMIN — IOHEXOL 100 ML: 350 INJECTION, SOLUTION INTRAVENOUS at 07:05

## 2025-05-11 RX ADMIN — SODIUM CHLORIDE 1000 ML: 9 INJECTION, SOLUTION INTRAVENOUS at 06:05

## 2025-05-11 NOTE — ED PROVIDER NOTES
CHIEF COMPLAINT  Chief Complaint   Patient presents with    Leg Pain     L leg pain radiating down L leg that started this morning. Denies recent trauma. Hx of back fx.       HISTORY OF PRESENT ILLNESS  Jacoby Jean-Baptiste is a 50 y.o. male with PMH as below, T12-L1 fractures, chronic pain who presents to ER for evaluation of left hip pain this morning. He had numbness on left lower leg, was not able to put any weight when he woke up this morning which was not normal to him. He took two pain medication, was able to put weight on left hip. He denies recent injury.  Patient reports SOB on exertion after total colostomy with ileostomy/jejunostomy on 3/28/25. He was discharged from his rehab on 4/30/25, he still had a problem with SOB on exertion or walking.  Patient takes Eliquis 5 mg twice a day due to history of DVT, acute PE on 1 month ago.   Patient has not followed up with PCP or specialist after he got out of rehab on 4/30/25. He was seen by his pulmonologist, Dr. Villarreal in [place] 2 years ago for Cavitary pneumonia. He denies history of asthma, COPD or CHF.      PAST MEDICAL HISTORY  Past Medical History:   Diagnosis Date    C. difficile colitis     Cavitary pneumonia 11/2023    pos aspergillus serology    Diabetes mellitus 01/2022    Hyperlipidemia 2015    Hypertension 2015    Insomnia 2015    Morbid obesity 03/05/2025    Ulcerative colitis        CURRENT MEDICATIONS    Current Medications[1]    ALLERGIES    Review of patient's allergies indicates:  No Known Allergies    SURGICAL HISTORY    Past Surgical History:   Procedure Laterality Date    BRONCHOSCOPY WITH FLUOROSCOPY Left 11/20/2023    Procedure: BRONCHOSCOPY, WITH FLUOROSCOPY;  Surgeon: Lisa Villarreal MD;  Location: [facility] END[facility];  Service: Pulmonary;  Laterality: Left;    BRONCHOSCOPY WITH FLUOROSCOPY N/A 11/30/2023    Procedure: BRONCHOSCOPY, WITH FLUOROSCOPY;  Surgeon: Lisa Villarreal MD;  Location: [facility] END[facility];  Service: Pulmonary;  Laterality: N/A;     CARDIAC ELECTROPHYSIOLOGY MAPPING AND ABLATION  2017    SVT    CHOLECYSTECTOMY  2011    COLON SURGERY      COLONOSCOPY N/A 05/03/2022    Procedure: COLONOSCOPY;  Surgeon: Shan Jean III, MD;  Location: [facility];  Service: Endoscopy;  Laterality: N/A;    COLONOSCOPY N/A 03/16/2024    Procedure: COLONOSCOPY;  Surgeon: ALEKSANDER Ramos MD;  Location: [facility] ENDO;  Service: Endoscopy;  Laterality: N/A;    COLONOSCOPY N/A 01/17/2025    Procedure: COLONOSCOPY;  Surgeon: Russ Rowe MD;  Location: [facility];  Service: Endoscopy;  Laterality: N/A;    COLONOSCOPY WITH FECAL MICROBIOTA TRANSFER N/A 03/21/2023    Procedure: COLONOSCOPY, WITH FECAL MICROBIOTA TRANSFER;  Surgeon: David Millan MD;  Location: [facility];  Service: Endoscopy;  Laterality: N/A;    ESOPHAGOGASTRODUODENOSCOPY N/A 04/24/2023    Procedure: EGD (ESOPHAGOGASTRODUODENOSCOPY);  Surgeon: Shan Jean III, MD;  Location: [facility];  Service: Endoscopy;  Laterality: N/A;    ESOPHAGOGASTRODUODENOSCOPY N/A 06/05/2024    Procedure: EGD (ESOPHAGOGASTRODUODENOSCOPY);  Surgeon: Odalis Mccabe MD;  Location: [facility];  Service: Endoscopy;  Laterality: N/A;    FLEXIBLE SIGMOIDOSCOPY N/A 06/05/2024    Procedure: SIGMOIDOSCOPY, FLEXIBLE;  Surgeon: Odalis Mccabe MD;  Location: [facility];  Service: Endoscopy;  Laterality: N/A;    FLEXIBLE SIGMOIDOSCOPY N/A 07/16/2024    Procedure: SIGMOIDOSCOPY, FLEXIBLE;  Surgeon: Shan Jean III, MD;  Location: [facility];  Service: Endoscopy;  Laterality: N/A;    FLEXIBLE SIGMOIDOSCOPY N/A 08/13/2024    Procedure: SIGMOIDOSCOPY, FLEXIBLE;  Surgeon: Shan Jean III, MD;  Location: [facility];  Service: Endoscopy;  Laterality: N/A;    GANGLION CYST EXCISION Left 1992    MAGNETIC RESONANCE IMAGING N/A 03/07/2025    Procedure: MRI (MAGNETIC RESONANCE IMAGING);  Surgeon: Nish, [facility];  Location: [facility];  Service: General;  Laterality: N/A;    UMBILICAL HERNIA REPAIR   2011    with mesh       SOCIAL HISTORY    Social History     Socioeconomic History    Marital status: Single   Tobacco Use    Smoking status: Former     Average packs/day: 1 pack/day for 30.0 years (30.0 ttl pk-yrs)     Types: Cigarettes     Start date: 1993    Smokeless tobacco: Never    Tobacco comments:     Pt states he has stopped smoking with Chantix three weeks ago. 12/1/23   Substance and Sexual Activity    Alcohol use: Yes     Comment: ocass    Drug use: Not Currently    Sexual activity: Not Currently     Social Drivers of Health     Financial Resource Strain: Patient Declined (3/10/2025)    Overall Financial Resource Strain (CARDIA)     Difficulty of Paying Living Expenses: Patient declined   Food Insecurity: No Food Insecurity (3/28/2025)    Received from [facility]    Hunger Vital Sign     Worried About Running Out of Food in the Last Year: Never true     Ran Out of Food in the Last Year: Never true   Transportation Needs: No Transportation Needs (3/28/2025)    Received from [facility]    PRAPARE - Transportation     Lack of Transportation (Medical): No     Lack of Transportation (Non-Medical): No   Physical Activity: Inactive (3/3/2025)    Exercise Vital Sign     Days of Exercise per Week: 0 days     Minutes of Exercise per Session: 0 min   Stress: Patient Declined (3/10/2025)    [employer] - Occupational Stress Questionnaire     Feeling of Stress : Patient declined   Recent Concern: Stress - Stress Concern Present (2/5/2025)    Received from [facility] [employer] [facility] [employer] - Occupational Stress Questionnaire     Feeling of Stress : Rather much   Housing Stability: Low Risk  (3/28/2025)    Received from [facility]    Housing Stability Vital Sign     Unable to Pay for Housing in the Last Year: No     Number of Times Moved in the Last Year: 0     Homeless in the Last Year: No       FAMILY HISTORY    Family History   Problem Relation Name Age of Onset  "   Asthma Mother         REVIEW OF SYSTEMS    Review of Systems   Respiratory:  Positive for shortness of breath.    Musculoskeletal:  Positive for joint pain.     All other systems reviewed and are negative    VITAL SIGNS:   /63   Pulse 90   Temp 98.1 °F (36.7 °C) (Oral)   Resp (!) 41   Ht 5' 6" (1.676 m)   Wt 117 kg (258 lb)   SpO2 95%   BMI 41.64 kg/m²      Physical Exam  Constitutional:       Appearance: Normal appearance. He is obese.   HENT:      Head: Normocephalic.      Mouth/Throat:      Mouth: Mucous membranes are moist.   Cardiovascular:      Rate and Rhythm: Normal rate.      Pulses:           Dorsalis pedis pulses are 2+ on the right side and 2+ on the left side.   Pulmonary:      Effort: Pulmonary effort is normal. No respiratory distress.   Abdominal:      Comments: Colostomy bag present    Musculoskeletal:         General: Normal range of motion.      Left hip: Tenderness present. Decreased strength.      Left knee: Normal range of motion.      Left lower leg: Normal.      Left ankle: Normal.      Right foot: Normal capillary refill. Normal pulse.      Left foot: Normal capillary refill. Normal pulse.   Skin:     General: Skin is warm.      Capillary Refill: Capillary refill takes less than 2 seconds.   Neurological:      General: No focal deficit present.      Mental Status: He is alert.      GCS: GCS eye subscore is 4. GCS verbal subscore is 5. GCS motor subscore is 6.   Psychiatric:         Attention and Perception: Attention normal.         Mood and Affect: Mood normal.         Speech: Speech normal.       Vitals and nursing note reviewed.     LABS    Labs Reviewed   COMPREHENSIVE METABOLIC PANEL - Abnormal       Result Value    Sodium 137      Potassium 4.7      Chloride 100      CO2 25      Glucose 172 (*)     BUN 15      Creatinine 1.3      Calcium 9.2      Protein Total 6.8      Albumin 2.6 (*)     Bilirubin Total 0.5            AST 13      ALT 19      Anion Gap 12      " eGFR >60     URINALYSIS, REFLEX TO URINE CULTURE - Abnormal    Color, UA Yellow      Appearance, UA Hazy (*)     pH, UA 7.0      Spec Grav UA 1.020      Protein, UA 1+ (*)     Glucose, UA Negative      Ketones, UA Negative      Bilirubin, UA Negative      Blood, UA Negative      Nitrites, UA Negative      Urobilinogen, UA Negative      Leukocyte Esterase, UA Trace (*)    CBC WITH DIFFERENTIAL - Abnormal    WBC 14.62 (*)     RBC 3.92 (*)     HGB 9.9 (*)     HCT 32.1 (*)     MCV 82      MCH 25.3 (*)     MCHC 30.8 (*)     RDW 16.6 (*)     Platelet Count 269      MPV 9.9      Nucleated RBC 0      Neut % 92.7 (*)     Lymph % 4.3 (*)     Mono % 2.3 (*)     Eos % 0.1      Basophil % 0.1      Imm Grans % 0.5      Neut # 13.55 (*)     Lymph # 0.63 (*)     Mono # 0.34      Eos # 0.01      Baso # 0.02      Imm Grans # 0.07 (*)    URINALYSIS MICROSCOPIC - Abnormal    RBC, UA 4      WBC, UA 12 (*)     Bacteria, UA Few (*)     Squamous Epithelial Cells, UA 3      Hyaline Casts, UA 2 (*)     Amorphous, UA Few      Microscopic Comment Many Mucus threads     B-TYPE NATRIURETIC PEPTIDE - Normal    BNP 29     TROPONIN I - Normal    Troponin-I <0.006     CULTURE, URINE   CBC W/ AUTO DIFFERENTIAL    Narrative:     The following orders were created for panel order Complete Blood Count (CBC).                  Procedure                               Abnormality         Status                                     ---------                               -----------         ------                                     CBC with Differential[[id number]]       Abnormal            Final result                                                 Please view results for these tests on the individual orders.   GREY TOP URINE HOLD    Extra Tube Hold for add-ons.           EKG        RADIOLOGY    CTA Chest Non-Coronary (PE Studies)   Final Result      No central or segmental pulmonary embolus.      Incompletely imaged fat stranding in the region of the  proximal duodenum and duodenal diverticulum.  Correlate for infectious or inflammatory process.      Subcutaneous gas in the left and right anterior abdominal wall.  Correlate with clinical findings.      Acute to subacute burst fracture of L1 with mild height loss.      Right lower lobe nodule.  Follow-up chest CT in 12 months.         Electronically signed by: Mirta Gayle   Date:    05/11/2025   Time:    21:50      X-Ray Chest PA And Lateral   Final Result      As above         Electronically signed by: Mirta Gayle   Date:    05/11/2025   Time:    18:52      US Lower Extremity Veins Left   Final Result      No evidence of deep venous thrombosis in the left lower extremity.         Electronically signed by: Mirta Gayle   Date:    05/11/2025   Time:    18:03      X-Ray Hip 2 or 3 views Left with Pelvis when performed   Final Result      As above         Electronically signed by: Berenice Sanchez MD   Date:    05/11/2025   Time:    17:04            PROCEDURES    Procedures    Medications   sodium chloride 0.9% bolus 1,000 mL 1,000 mL (0 mLs Intravenous Stopped 5/11/25 1924)   morphine injection 4 mg (4 mg Intravenous Given 5/11/25 1847)   iohexoL (OMNIPAQUE 350) injection 100 mL (100 mLs Intravenous Given 5/11/25 1943)   albuterol-ipratropium 2.5 mg-0.5 mg/3 mL nebulizer solution 3 mL (3 mLs Nebulization Given 5/11/25 2200)                Medical Decision Making  Jacoby Jean-Baptiste is a 50 y.o. male with PMH as below, T12-L1 fractures, chronic pain who presents to ER for evaluation of left hip pain this morning. He had numbness on left lower leg, was not able to put any weight when he woke up this morning which was not normal to him. He took two pain medication, was able to put weight on left hip. He denies recent injury.  Patient reports SOB on exertion after total colostomy with ileostomy/jejunostomy on 3/28/25. He was discharged from his rehab on 4/30/25, he still had a problem with SOB on  exertion or walking.  Patient takes Eliquis 5 mg twice a day due to history of DVT, acute PE on 1 month ago.  Patient has not followed up with PCP or specialist after he got out of rehab on 4/30/25. He was seen by his pulmonologist, Dr. Villarreal in [place] 2 years ago for Cavitary pneumonia. He denies history of asthma, COPD or CHF.    Differential Diagnosis includes but is not limited to: CHF, ACS, Pneumonia, Pneumothorax. Differentials I have considered and consider less likely: Tamponade, PE  No history of CHF or COPD  No pneumothorax on xray   Patient states he did not feel safe to go home with BAILEY at home.   Due to ongoing BAILEY, after discussing with DR. Villarreal, PE study ordered.   CTA result - no PE  Patient did not have lung collapse, pleural effusion or pneumonia  Patient's SOB was treated with albuterol inhaler,incentive spirometer patient is on pain management, pain controlled during ER visit.  he was able to walk in patient's room.   Ordered Referral to pulmonologist  Cefdinir prescribed for UTI    Problems Addressed:  Left hip pain: acute illness or injury  Left leg pain: acute illness or injury  Osteoarthritis of left hip, unspecified osteoarthritis type: acute illness or injury  SOB (shortness of breath): chronic illness or injury with exacerbation, progression, or side effects of treatment     Details: Hx of PE    Amount and/or Complexity of Data Reviewed  External Data Reviewed: radiology.     Details: 1.   Small right lower lobe segmental pulmonary embolus. No large central pulmonary embolus. Findings discussed with the patient's nurse at 4/4/2025 4:51 PM CDT with verbal confirmation. This appears relatively unchanged compared to 3/30/2025.   2.   Trace left and small right pleural effusions. This appears increased on the right of the interim.   3.   Mild mostly enhancing dependent consolidation/opacity in both lung bases favoring atelectasis although aspiration not excluded. This appears increased on  the right in the interim. Additional linear scarring or atelectasis in the lungs.   4.   Coronary artery calcifications.   5.   Subcutaneous air along the lower chest/upper abdominal wall is partially visualized, likely related to recent surgery and appears decreased over the interim. Please refer to same-day CT abdomen and pelvis report for further assessment.   6.   Other chronic and incidental findings as detailed in the body.   Rickie Lazo M.D., [facility].     Electronically Signed By: Rickie Lazo MD 4/4/2025 4:54 PM CDT     Labs: ordered. Decision-making details documented in ED Course.     Details: Elevated WBC  No NAZIA, negative trop  Radiology: ordered. Decision-making details documented in ED Course.     Details: Reviewed cta result with patient   ECG/medicine tests: ordered. Decision-making details documented in ED Course.     Details: NSR, HR 83, no ST elevation     Risk  Prescription drug management.           Discharge Medication List as of 5/11/2025 10:02 PM          Discharge Medication List as of 5/11/2025 10:02 PM        START taking these medications    Details   albuterol (PROVENTIL/VENTOLIN HFA) 90 mcg/actuation inhaler Inhale 1-2 puffs into the lungs every 6 (six) hours as needed for Wheezing or Shortness of Breath. Rescue, Starting Sun 5/11/2025, Until Mon 5/11/2026 at 2359, Normal      cefdinir (OMNICEF) 300 MG capsule Take 1 capsule (300 mg total) by mouth 2 (two) times daily. for 7 days, Starting Sun 5/11/2025, Until Sun 5/18/2025, Normal           I discussed with patient and/or family/caretaker that evaluation in the ED does not suggest any emergent or life threatening medical condition requiring immediate intervention beyond what was provided in the ED, and I believe the patient is safe for discharge.  Regardless, an unremarkable evaluation in the ED does not preclude the development or presence of a serious or life threatening condition. As such, patient was  instructed to return immediately for any worsening or change in current symptoms  Patient agrees with plan of care.    DISPOSITION  Discharged to home with stable condition        FINAL IMPRESSION    1. SOB (shortness of breath)    2. Left hip pain    3. Left leg pain    4. Osteoarthritis of left hip, unspecified osteoarthritis type    5. Urinary tract infection without hematuria, site unspecified           [1] No current facility-administered medications for this encounter.    Current Outpatient Medications:     bezlotoxumab (ZINPLAVA) 25 mg/mL Soln injection, 40 ml, Disp: , Rfl:     budesonide (UCERIS) 2 mg/actuation Foam, 66.8 gram, Disp: , Rfl:     cholestyramine (QUESTRAN) 4 gram packet, Take by mouth., Disp: , Rfl:     cyclobenzaprine (FLEXERIL) 10 MG tablet, Take 10 mg by mouth 3 (three) times daily., Disp: , Rfl:     fidaxomicin (DIFICID) 200 mg Tab, 20 tablets., Disp: , Rfl:     guselkumab (TREMFYA PEN) 200 mg/2 mL PnIj, 3 each., Disp: , Rfl:     HYDROcodone-acetaminophen (NORCO) 5-325 mg per tablet, 60 tablets., Disp: , Rfl:     hyoscyamine (LEVBID) 0.375 mg Tb12, Take 0.375 mg by mouth 2 (two) times daily., Disp: , Rfl:     Lactobac 2-Bifido 1-S. therm (VSL#3) 112.5 billion cell Cap, Take 1 capsule by mouth., Disp: , Rfl:     LIDOcaine (LIDODERM) 5 %, 2 patches., Disp: , Rfl:     magnesium oxide (MAG-OX) 400 mg (241.3 mg magnesium) tablet, Take 1 tablet by mouth., Disp: , Rfl:     methocarbamoL (ROBAXIN) 750 MG Tab, Take 750 mg by mouth 3 (three) times daily., Disp: , Rfl:     mirikizumab-mrkz (OMVOH PEN) 100 mg/mL PnIj, 2 each., Disp: , Rfl:     oxyCODONE-acetaminophen (PERCOCET)  mg per tablet, Take 1 tablet by mouth every 4 (four) hours as needed., Disp: , Rfl:     albuterol (PROVENTIL/VENTOLIN HFA) 90 mcg/actuation inhaler, Inhale 1-2 puffs into the lungs every 6 (six) hours as needed for Wheezing or Shortness of Breath. Rescue, Disp: 6.7 g, Rfl: 0    budesonide (ENTOCORT EC) 3 mg capsule, Take  9 mg by mouth., Disp: , Rfl:     busPIRone (BUSPAR) 15 MG tablet, Take 1 tablet (15 mg total) by mouth 3 (three) times daily., Disp: 90 tablet, Rfl: 5    cefdinir (OMNICEF) 300 MG capsule, Take 1 capsule (300 mg total) by mouth 2 (two) times daily. for 7 days, Disp: 14 capsule, Rfl: 0    diazePAM (VALIUM) 5 MG tablet, Take 1 tablet (5 mg total) by mouth daily as needed for Anxiety., Disp: 30 tablet, Rfl: 0    ELIQUIS 5 mg Tab, Take 5 mg by mouth 2 (two) times daily., Disp: , Rfl:     ergocalciferol (ERGOCALCIFEROL) 50,000 unit Cap, Take 1 capsule by mouth every Sunday., Disp: , Rfl:     EScitalopram oxalate (LEXAPRO) 20 MG tablet, Take 1 tablet (20 mg total) by mouth once daily., Disp: 90 tablet, Rfl: 0    gabapentin (NEURONTIN) 100 MG capsule, Take 100 mg by mouth 3 (three) times daily., Disp: , Rfl:     metFORMIN (GLUCOPHAGE-XR) 500 MG ER 24hr tablet, 180, Disp: , Rfl:     metoprolol succinate (TOPROL-XL) 50 MG 24 hr tablet, Take 1 tablet (50 mg total) by mouth once daily., Disp: 90 tablet, Rfl: 1    oxyCODONE (ROXICODONE) 15 MG Tab, Take 1 tablet (15 mg total) by mouth every 6 (six) hours as needed for Pain., Disp: 20 tablet, Rfl: 0    pantoprazole (PROTONIX) 40 MG tablet, Take 40 mg by mouth 2 (two) times daily., Disp: , Rfl:     predniSONE (DELTASONE) 10 MG tablet, Take 1 tablet (10 mg total) by mouth once daily., Disp: 30 tablet, Rfl: 2    pregabalin (LYRICA) 50 MG capsule, Take 1 capsule by mouth 2 (two) times daily., Disp: , Rfl:     promethazine (PHENERGAN) 25 MG tablet, Take 25 mg by mouth 4 (four) times daily as needed., Disp: , Rfl:     rivaroxaban (XARELTO) 15 mg Tab, 42, Disp: , Rfl:     semaglutide (OZEMPIC) 0.25 mg or 0.5 mg(2 mg/1.5 mL) pen injector, 1, Disp: , Rfl:     simvastatin (ZOCOR) 20 MG tablet, Take 1 tablet (20 mg total) by mouth every evening., Disp: 90 tablet, Rfl: 1    zolpidem (AMBIEN) 10 mg Tab, Take 1 tablet (10 mg total) by mouth nightly as needed (insomnia)., Disp: 30 tablet, Rfl:  2    Facility-Administered Medications Ordered in Other Encounters:     lactated ringers infusion, , Intravenous, Continuous, David Millan MD, Last Rate: 75 mL/hr at 03/21/23 1252, New Bag at 03/21/23 1252       Leha Venegas NP  05/11/25 0815

## 2025-05-12 LAB
OHS QRS DURATION: 68 MS
OHS QTC CALCULATION: 438 MS

## 2025-05-12 NOTE — DISCHARGE INSTRUCTIONS
Take the medications as prescribed. Return for any worsening or new symptoms. Follow up with Primary Care Provider in the next 2-3 days.   Follow up with pulmonologist

## 2025-05-13 ENCOUNTER — LAB VISIT (OUTPATIENT)
Dept: LAB | Facility: HOSPITAL | Age: 50
End: 2025-05-13
Payer: COMMERCIAL

## 2025-05-13 ENCOUNTER — OFFICE VISIT (OUTPATIENT)
Dept: FAMILY MEDICINE | Facility: CLINIC | Age: 50
End: 2025-05-13
Payer: COMMERCIAL

## 2025-05-13 VITALS
HEART RATE: 96 BPM | TEMPERATURE: 97 F | WEIGHT: 261.94 LBS | BODY MASS INDEX: 42.1 KG/M2 | RESPIRATION RATE: 18 BRPM | OXYGEN SATURATION: 95 % | SYSTOLIC BLOOD PRESSURE: 130 MMHG | DIASTOLIC BLOOD PRESSURE: 86 MMHG | HEIGHT: 66 IN

## 2025-05-13 DIAGNOSIS — K52.9 PROCTOCOLITIS: ICD-10-CM

## 2025-05-13 DIAGNOSIS — G47.00 INSOMNIA, UNSPECIFIED TYPE: ICD-10-CM

## 2025-05-13 DIAGNOSIS — F41.9 ANXIETY: ICD-10-CM

## 2025-05-13 DIAGNOSIS — Z09 HOSPITAL DISCHARGE FOLLOW-UP: Primary | ICD-10-CM

## 2025-05-13 DIAGNOSIS — K51.011 ULCERATIVE PANCOLITIS WITH RECTAL BLEEDING: ICD-10-CM

## 2025-05-13 DIAGNOSIS — Z90.49 STATUS POST TOTAL COLECTOMY: ICD-10-CM

## 2025-05-13 DIAGNOSIS — F41.0 PANIC ATTACKS: ICD-10-CM

## 2025-05-13 DIAGNOSIS — F32.A DEPRESSION, UNSPECIFIED DEPRESSION TYPE: ICD-10-CM

## 2025-05-13 DIAGNOSIS — I70.0 ATHEROSCLEROSIS OF AORTA: ICD-10-CM

## 2025-05-13 LAB
ABSOLUTE EOSINOPHIL (SMH): 0.12 K/UL
ABSOLUTE MONOCYTE (SMH): 0.74 K/UL (ref 0.3–1)
ABSOLUTE NEUTROPHIL COUNT (SMH): 16.4 K/UL (ref 1.8–7.7)
ALBUMIN SERPL-MCNC: 3.8 G/DL (ref 3.5–5.2)
ALP SERPL-CCNC: 111 UNIT/L (ref 55–135)
ALT SERPL-CCNC: 16 UNIT/L (ref 10–44)
ANION GAP (SMH): 12 MMOL/L (ref 8–16)
AST SERPL-CCNC: 13 UNIT/L (ref 10–40)
BACTERIA UR CULT: NO GROWTH
BASOPHILS # BLD AUTO: 0.05 K/UL
BASOPHILS NFR BLD AUTO: 0.3 %
BILIRUB SERPL-MCNC: 0.4 MG/DL (ref 0.1–1)
BUN SERPL-MCNC: 12 MG/DL (ref 6–20)
CALCIUM SERPL-MCNC: 10.1 MG/DL (ref 8.7–10.5)
CHLORIDE SERPL-SCNC: 102 MMOL/L (ref 95–110)
CO2 SERPL-SCNC: 26 MMOL/L (ref 23–29)
CREAT SERPL-MCNC: 1.1 MG/DL (ref 0.5–1.4)
ERYTHROCYTE [DISTWIDTH] IN BLOOD BY AUTOMATED COUNT: 17.2 % (ref 11.5–14.5)
GFR SERPLBLD CREATININE-BSD FMLA CKD-EPI: >60 ML/MIN/1.73/M2
GLUCOSE SERPL-MCNC: 117 MG/DL (ref 70–110)
HCT VFR BLD AUTO: 38.5 % (ref 40–54)
HGB BLD-MCNC: 11.2 GM/DL (ref 14–18)
IMM GRANULOCYTES # BLD AUTO: 0.18 K/UL (ref 0–0.04)
IMM GRANULOCYTES NFR BLD AUTO: 1 % (ref 0–0.5)
LYMPHOCYTES # BLD AUTO: 1.11 K/UL (ref 1–4.8)
MCH RBC QN AUTO: 25.2 PG (ref 27–31)
MCHC RBC AUTO-ENTMCNC: 29.1 G/DL (ref 32–36)
MCV RBC AUTO: 87 FL (ref 82–98)
NUCLEATED RBC (/100WBC) (SMH): 0 /100 WBC
PLATELET # BLD AUTO: 342 K/UL (ref 150–450)
PMV BLD AUTO: 10.6 FL (ref 9.2–12.9)
POTASSIUM SERPL-SCNC: 4.4 MMOL/L (ref 3.5–5.1)
PROT SERPL-MCNC: 8.2 GM/DL (ref 6–8.4)
RBC # BLD AUTO: 4.44 M/UL (ref 4.6–6.2)
RELATIVE EOSINOPHIL (SMH): 0.6 % (ref 0–8)
RELATIVE LYMPHOCYTE (SMH): 6 % (ref 18–48)
RELATIVE MONOCYTE (SMH): 4 % (ref 4–15)
RELATIVE NEUTROPHIL (SMH): 88.1 % (ref 38–73)
SODIUM SERPL-SCNC: 140 MMOL/L (ref 136–145)
WBC # BLD AUTO: 18.62 K/UL (ref 3.9–12.7)

## 2025-05-13 PROCEDURE — 99999 PR PBB SHADOW E&M-EST. PATIENT-LVL V: CPT | Mod: PBBFAC,,,

## 2025-05-13 PROCEDURE — 36415 COLL VENOUS BLD VENIPUNCTURE: CPT

## 2025-05-13 PROCEDURE — 82040 ASSAY OF SERUM ALBUMIN: CPT

## 2025-05-13 PROCEDURE — 85025 COMPLETE CBC W/AUTO DIFF WBC: CPT

## 2025-05-13 RX ORDER — ZOLPIDEM TARTRATE 10 MG/1
10 TABLET ORAL NIGHTLY PRN
Qty: 30 TABLET | Refills: 2 | Status: SHIPPED | OUTPATIENT
Start: 2025-05-13 | End: 2025-11-11

## 2025-05-13 RX ORDER — CLONAZEPAM 0.5 MG/1
0.5 TABLET ORAL 2 TIMES DAILY PRN
Qty: 60 TABLET | Refills: 2 | Status: SHIPPED | OUTPATIENT
Start: 2025-05-13 | End: 2026-05-13

## 2025-05-13 NOTE — PATIENT INSTRUCTIONS
PSYCHIATRY WILL CALL YOU TO SCHEDULE   Abigail Quintero MD  [address] [address]  Phone: [phone]  Fax: [phone]

## 2025-05-13 NOTE — PROGRESS NOTES
Subjective:       Patient ID: Jacoby Jean-Baptiste is a 50 y.o. male.    Admit Date: 03/28/2025  Discharge Date: 04/09/2024 from hospital to SNF  Discharge Facility: SNF on 4/30/25  Family and/or Caretaker present at visit?  No.  Diagnostic tests reviewed/disposition: I have reviewed all completed as well as pending diagnostic tests at the time of discharge.  Disease/illness education: importance of follow up.  Return precautions.     Home health/community services discussion/referrals: Patient has home health established at not sure which company.   Establishment or re-establishment of referral orders for community resources: No other necessary community resources.   Discussion with other health care providers: No discussion with other health care providers necessary.     Chief Complaint: Follow-up    Devin Jean-Baptiste is a 50 y.o. male patient with history of anxiety, depression, panic attacks, hypertension, hyperlipidemia, ulcerative colitis, proctocolitis, C-diff,  type 2 DM, DVT that presents to clinic for hospital follow up.  Patient was admitted to the hospital for robotic assisted total abdominal colectomy w/ end ileostomy on 3/28/25.  He developed shortness of breath postoperatively and CTA showed suspected subsegmental pulmonary embolism in the right lower lobe.  BLE DVT US neg, BUE DVT US w/ superficial RUE thrombosis, likely chronic. He had a drop in his hemoglobin to 5.7 and was transfused with 1 unit PRBCS.  Hemoglobin improved to 7.3.  on discharge his hemoglobin was 10.2.  He had increasing shoulder pain and CT abdomen/pelvis was significant for 2 cm segment 5 hepatic subcapsular hypodense lesion appears larger than prior study. He had an elevated wbc so was started on antibiotics.  WBC on discharge was  19.4 which was down from 27.  He was discharged to SNF where he was continued on IV zosyn.  He was discharged from SNF on 4/30/2025.  He went to ER on 5/11 with complaints of left hip and leg pain. Prior to  arrival to ER he took pain medication which improved his pain.  He was diagnosed with osteoarthritis in his right hip.  He also was noted to have a UTI.  He was prescribed ceftin which he is still taking.  His WBC in the ER was high at 14.62.  today he states he is still having some shortness of breath but is improved with his inhalers.  He is taking eliquis 5 mg twice daily.  He is using his IS as recommended.  He is trying to get up and move around more.  Home health nurse is helping him with ostomy care.  He is working with PT.  He is currently living with his brother.  He is still having some surgical discomfort but it is managed well with oxycodone.  He state he is having panic attacks at least once daily.  This is worsened if he is around crowds of people.  He is taking his buspar and lexapro but it is not helping much. He has taken ativan and valium in the past which helped sometimes.  He is taking ambien at night which helps him sleep.  He is on daily prednisone.  He has appointment with Dr Villarreal with Pulmonology next week.  He has follow up scheduled with Dr Church on 6/3.  He is still smoking occasionally.  He denies any dysuria, hematuria, flank pain, or urgency.  He denies any blood in his stool.        Hospital Course:   Jacoby Jean-Baptiste is a 50 y.o. male  w/ a PMHx of HTN, DMII, C diff colitis, Ulcerative Colitis who presented to preop the morning of 3/28 for planned robotic assisted total abdominal colectomy w/ end ileostomy. Pt meredith the procedure well and was admitted to the floor for postop monitoring. Pt progressing well as expected postoperatively.     3/30- pt with very little physical activity, reports that pain regimen is adequate and activity is no longer limited 2/2 pain. Pt with O2 requirement, SOB, and productive cough. Pulmonology consulted.     3/31- CTA Chest yd c/f possible subsegmental PE, BLE DVT US neg, BUE DVT US w/ superficial RUE thrombosis, likely chronic. Pt w/ drop in  H/H to 5.7 overnight, which responded w/ 1 U PRBC to 7.3 this AM. Pt remains hemodynamically stable. Pain much improved from prior.      4/4 - Pt with increasing shoulder pain, nausea. CTAP significant for 2 cm segment 5 hepatic subcapsular hypodense lesion appears larger than prior study. CTPE obtained.     4/5 - continuing to encourage patient to eat and get up OOB as much as possible. No new or worsening pathology seen on repeat CT imaging.      4/7 - pt continues to have little physical activity, was educated again on the importance of OOB. CXR unremarkable, demonstrating right perihilar subsegmental atelectasis, similar to prior.      4/8 - pt remains clinically stable. Antibiotics started given increased WBC, downtrending after abx started     Consults: pulmonology, case management, PT/OT       DISCHARGE MEDICATION NOTES:   - Patient is on IV zosyn, will need 5 more days (until 4/14), 4.5 g Q12H  - Patient is on steroid taper, will need to continue               - 4/9-4/12: 20 mg prednisone daily               - 4/: 15 mg prednisone daily               - 4/16-4/18: 10 mg prednisone daily               - 4/19-4/21: 5 mg prednisone daily        Activity: no lifting, driving, or strenuous exercise for 4 weeks  Diet: regular diet  Wound Care: keep wound clean and dry and ostomy care as needed      Follow-up with Dr. Church as scheduled            CT Chest w/ CTA Cardiac  Order: [id number]  Impression    1.   Suspected subsegmental pulmonary embolism in the right lower lobe. No central or segmental pulmonary embolism. No CT evidence of right heart strain. Main pulmonary artery has normal caliber.    2.   Subsegmental atelectasis in the right middle and bilateral lower lobes.    3.   Mild coronary artery calcification.    4.   Postsurgical subcutaneous emphysema throughout the lower chest wall.    5.   Please see separately dictated CT abdomen and pelvis for abdominopelvic  findings.  -----------------------------------------------------------------------------------------------------------------------------------------------------------------------------------------------------------------  US Upper Extremity Veins Bilateral  Order: [id number]  Impression    1.   Noncompressible right basilic, consistent with thrombosis. However, this is considered a superficial vein. The remaining superficial veins in both arms are not seen, possibly chronically thrombosed and scarred as well.  2.   Otherwise no sign of deep venous thrombosis in either upper extremity.    Electronically Signed By: Alonzo Howe MD 3/31/2025 12:06 AM CDT    --------------------------------------------------------------------------------------------------------------------------------------------------------------------------------------------------------------  VAS US Venous Legs Bilateral  Order: [id number]  Impression    1.   No evidence of left or right lower extremity deep venous thrombosis.    Electronically Signed By: Alonzo Howe MD 3/31/2025 12:02 AM CDT          Review of patient's allergies indicates:  No Known Allergies  Social Drivers of Health     Tobacco Use: Medium Risk (5/13/2025)    Patient History     Smoking Tobacco Use: Former     Smokeless Tobacco Use: Never     Passive Exposure: Not on file   Alcohol Use: Not At Risk (5/13/2025)    AUDIT-C     Frequency of Alcohol Consumption: Never     Average Number of Drinks: Patient does not drink     Frequency of Binge Drinking: Never   Financial Resource Strain: Low Risk  (5/13/2025)    Overall Financial Resource Strain (CARDIA)     Difficulty of Paying Living Expenses: Not hard at all   Food Insecurity: No Food Insecurity (5/13/2025)    Hunger Vital Sign     Worried About Running Out of Food in the Last Year: Never true     Ran Out of Food in the Last Year: Never true   Transportation Needs: No Transportation Needs (5/13/2025)    PRAPARE -  Transportation     Lack of Transportation (Medical): No     Lack of Transportation (Non-Medical): No   Physical Activity: Insufficiently Active (5/13/2025)    Exercise Vital Sign     Days of Exercise per Week: 2 days     Minutes of Exercise per Session: 30 min   Stress: Stress Concern Present (5/13/2025)    [employer] - Occupational Stress Questionnaire     Feeling of Stress : Very much   Housing Stability: Low Risk  (5/13/2025)    Housing Stability Vital Sign     Unable to Pay for Housing in the Last Year: No     Number of Times Moved in the Last Year: 0     Homeless in the Last Year: No   Depression: Low Risk  (3/21/2025)    Depression     Last PHQ-4: Flowsheet Data: 2   Utilities: Not At Risk (5/13/2025)    [facility] Utilities     Threatened with loss of utilities: No   Health Literacy: Adequate Health Literacy (5/13/2025)     Health Literacy     Frequency of need for help with medical instructions: Never   Social Isolation: Socially Integrated (12/27/2024)    Social Isolation     Social Isolation: 1      Past Medical History:   Diagnosis Date    C. difficile colitis     Cavitary pneumonia 11/2023    pos aspergillus serology    Diabetes mellitus 01/2022    Hyperlipidemia 2015    Hypertension 2015    Insomnia 2015    Morbid obesity 03/05/2025    Ulcerative colitis       Past Surgical History:   Procedure Laterality Date    BRONCHOSCOPY WITH FLUOROSCOPY Left 11/20/2023    Procedure: BRONCHOSCOPY, WITH FLUOROSCOPY;  Surgeon: Lisa Villarreal MD;  Location: [facility] ENDO;  Service: Pulmonary;  Laterality: Left;    BRONCHOSCOPY WITH FLUOROSCOPY N/A 11/30/2023    Procedure: BRONCHOSCOPY, WITH FLUOROSCOPY;  Surgeon: Lisa Villarreal MD;  Location: [facility] ENDO;  Service: Pulmonary;  Laterality: N/A;    CARDIAC ELECTROPHYSIOLOGY MAPPING AND ABLATION  2017    SVT    CHOLECYSTECTOMY  2011    COLON SURGERY      COLONOSCOPY N/A 05/03/2022    Procedure: COLONOSCOPY;  Surgeon: Shan Jean III,  MD;  Location: [facility];  Service: Endoscopy;  Laterality: N/A;    COLONOSCOPY N/A 03/16/2024    Procedure: COLONOSCOPY;  Surgeon: ALEKSANDER Ramos MD;  Location: [facility] ENDO;  Service: Endoscopy;  Laterality: N/A;    COLONOSCOPY N/A 01/17/2025    Procedure: COLONOSCOPY;  Surgeon: Russ Rowe MD;  Location: [facility];  Service: Endoscopy;  Laterality: N/A;    COLONOSCOPY WITH FECAL MICROBIOTA TRANSFER N/A 03/21/2023    Procedure: COLONOSCOPY, WITH FECAL MICROBIOTA TRANSFER;  Surgeon: David Millan MD;  Location: [facility];  Service: Endoscopy;  Laterality: N/A;    ESOPHAGOGASTRODUODENOSCOPY N/A 04/24/2023    Procedure: EGD (ESOPHAGOGASTRODUODENOSCOPY);  Surgeon: Shan Jean III, MD;  Location: [facility];  Service: Endoscopy;  Laterality: N/A;    ESOPHAGOGASTRODUODENOSCOPY N/A 06/05/2024    Procedure: EGD (ESOPHAGOGASTRODUODENOSCOPY);  Surgeon: Odalis Mccabe MD;  Location: [facility];  Service: Endoscopy;  Laterality: N/A;    FLEXIBLE SIGMOIDOSCOPY N/A 06/05/2024    Procedure: SIGMOIDOSCOPY, FLEXIBLE;  Surgeon: Odalis Mccabe MD;  Location: [facility];  Service: Endoscopy;  Laterality: N/A;    FLEXIBLE SIGMOIDOSCOPY N/A 07/16/2024    Procedure: SIGMOIDOSCOPY, FLEXIBLE;  Surgeon: Shan Jean III, MD;  Location: [facility];  Service: Endoscopy;  Laterality: N/A;    FLEXIBLE SIGMOIDOSCOPY N/A 08/13/2024    Procedure: SIGMOIDOSCOPY, FLEXIBLE;  Surgeon: Shan Jean III, MD;  Location: [facility];  Service: Endoscopy;  Laterality: N/A;    GANGLION CYST EXCISION Left 1992    MAGNETIC RESONANCE IMAGING N/A 03/07/2025    Procedure: MRI (MAGNETIC RESONANCE IMAGING);  Surgeon: Nish, [facility];  Location: [facility];  Service: General;  Laterality: N/A;    UMBILICAL HERNIA REPAIR  2011    with mesh      Social History[1]    Current Medications[2]    Lab Results   Component Value Date    WBC 18.62 (H) 05/13/2025    HGB 11.2 (L) 05/13/2025    HCT 38.5 (L) 05/13/2025    PLT  342 05/13/2025    CHOL 132 03/05/2025    TRIG 87 03/05/2025    HDL 53 03/05/2025    ALT 16 05/13/2025    AST 13 05/13/2025     05/13/2025    K 4.4 05/13/2025     05/13/2025    CREATININE 1.1 05/13/2025    BUN 12 05/13/2025    CO2 26 05/13/2025    TSH 2.037 03/05/2025    PSA 0.24 03/10/2021    INR 0.9 03/28/2025    HGBA1C 5.3 03/05/2025    MICROALBUR 1.2 01/26/2022       Review of Systems   Constitutional:  Positive for fatigue. Negative for activity change, appetite change, chills and fever.   HENT: Negative.     Respiratory:  Positive for shortness of breath. Negative for chest tightness and wheezing.         Has sob with exertion that is improved with albuterol.    Cardiovascular:  Negative for chest pain, palpitations and leg swelling.   Gastrointestinal:  Positive for abdominal pain. Negative for blood in stool, nausea and vomiting.        Surgical pain near ileostomy site.    Genitourinary: Negative.    Musculoskeletal:  Positive for back pain and leg pain.   Neurological:  Positive for numbness.        Numbness left leg   Psychiatric/Behavioral:  Positive for depressed mood. Negative for suicidal ideas. The patient is nervous/anxious.        Objective:      Physical Exam  Vitals reviewed.   Constitutional:       Appearance: Normal appearance. He is obese.   Cardiovascular:      Rate and Rhythm: Normal rate and regular rhythm.      Pulses: Normal pulses.      Heart sounds: Normal heart sounds.   Pulmonary:      Effort: Pulmonary effort is normal.      Breath sounds: Normal breath sounds.   Abdominal:      General: The ostomy site is clean.      Palpations: Abdomen is soft.      Tenderness: There is abdominal tenderness in the right lower quadrant.      Comments: Tenderness near ileostomy   Musculoskeletal:         General: Normal range of motion.      Cervical back: Normal range of motion.      Right lower leg: No edema.      Left lower leg: No edema.   Lymphadenopathy:      Cervical: No cervical  "adenopathy.   Skin:     General: Skin is warm and dry.      Capillary Refill: Capillary refill takes less than 2 seconds.   Neurological:      General: No focal deficit present.      Mental Status: He is alert.   Psychiatric:         Mood and Affect: Affect normal. Mood is depressed.         Speech: Speech normal.         Behavior: Behavior normal. Behavior is cooperative.         Thought Content: Thought content normal. Thought content does not include homicidal or suicidal ideation.         Judgment: Judgment normal.         Assessment:       1. Hospital discharge follow-up    2. Insomnia, unspecified type    3. Anxiety    4. Depression, unspecified depression type    5. Panic attacks    6. Ulcerative pancolitis with rectal bleeding    7. Proctocolitis    8. Status post total colectomy    9. Atherosclerosis of aorta        Plan:       Jacoby Waters" was seen today for follow-up.    Diagnoses and all orders for this visit:    Hospital discharge follow-up    Insomnia, unspecified type  -     zolpidem (AMBIEN) 10 mg Tab; Take 1 tablet (10 mg total) by mouth nightly as needed (insomnia).    Anxiety  -     Ambulatory referral/consult to Psychiatry; Future  -     clonazePAM (KLONOPIN) 0.5 MG tablet; Take 1 tablet (0.5 mg total) by mouth 2 (two) times daily as needed for Anxiety.    Depression, unspecified depression type  -     Ambulatory referral/consult to Psychiatry; Future    Panic attacks  -     Ambulatory referral/consult to Psychiatry; Future  -     clonazePAM (KLONOPIN) 0.5 MG tablet; Take 1 tablet (0.5 mg total) by mouth 2 (two) times daily as needed for Anxiety.    Ulcerative pancolitis with rectal bleeding    Proctocolitis    Status post total colectomy  -     CBC Auto Differential; Future  -     Comprehensive Metabolic Panel; Future    Atherosclerosis of aorta    Repeat labs to follow CBC.  Continue ceftin till completed prescription.    Collaborated with Dr Aguilar in reference to his anxiety.  He " recommended Klonopin 0.5 mg BID only as needed.    Refilled his ambien x 3 months.  checked.  Patient educated on waiting 6-8 hours between doses of Ambien, klonopin or pain medication.  Radhanet verbalizes understanding.   Keep appointment with Pulmonology and colorectal surgery as scheduled.  Continue eliquis.  Referral placed to Psychiatry.    Severe Coronary artery atherosclerosis was noted on his CTA.  He needs to see Dr Roman about this.  Patient will call and make appointment.    Keep appointment with Dr Aguilar in August for follow up or sooner if needed.                [1]   Social History  Socioeconomic History    Marital status: Single   [2]   Current Outpatient Medications:     albuterol (PROVENTIL/VENTOLIN HFA) 90 mcg/actuation inhaler, Inhale 1-2 puffs into the lungs every 6 (six) hours as needed for Wheezing or Shortness of Breath. Rescue, Disp: 6.7 g, Rfl: 0    budesonide (ENTOCORT EC) 3 mg capsule, Take 9 mg by mouth., Disp: , Rfl:     budesonide (UCERIS) 2 mg/actuation Foam, 66.8 gram, Disp: , Rfl:     busPIRone (BUSPAR) 15 MG tablet, Take 1 tablet (15 mg total) by mouth 3 (three) times daily., Disp: 90 tablet, Rfl: 5    cefdinir (OMNICEF) 300 MG capsule, Take 1 capsule (300 mg total) by mouth 2 (two) times daily. for 7 days, Disp: 14 capsule, Rfl: 0    cholestyramine (QUESTRAN) 4 gram packet, Take by mouth., Disp: , Rfl:     diazePAM (VALIUM) 5 MG tablet, Take 1 tablet (5 mg total) by mouth daily as needed for Anxiety., Disp: 30 tablet, Rfl: 0    ELIQUIS 5 mg Tab, Take 5 mg by mouth 2 (two) times daily., Disp: , Rfl:     ergocalciferol (ERGOCALCIFEROL) 50,000 unit Cap, Take 1 capsule by mouth every Sunday., Disp: , Rfl:     EScitalopram oxalate (LEXAPRO) 20 MG tablet, Take 1 tablet (20 mg total) by mouth once daily., Disp: 90 tablet, Rfl: 0    gabapentin (NEURONTIN) 100 MG capsule, Take 100 mg by mouth 3 (three) times daily., Disp: , Rfl:     hyoscyamine (LEVBID) 0.375 mg Tb12, Take 0.375 mg by  mouth 2 (two) times daily., Disp: , Rfl:     Lactobac 2-Bifido 1-S. therm (VSL#3) 112.5 billion cell Cap, Take 1 capsule by mouth., Disp: , Rfl:     LIDOcaine (LIDODERM) 5 %, 2 patches., Disp: , Rfl:     methocarbamoL (ROBAXIN) 750 MG Tab, Take 750 mg by mouth 3 (three) times daily., Disp: , Rfl:     metoprolol succinate (TOPROL-XL) 50 MG 24 hr tablet, Take 1 tablet (50 mg total) by mouth once daily., Disp: 90 tablet, Rfl: 1    pantoprazole (PROTONIX) 40 MG tablet, Take 40 mg by mouth 2 (two) times daily., Disp: , Rfl:     predniSONE (DELTASONE) 10 MG tablet, Take 1 tablet (10 mg total) by mouth once daily., Disp: 30 tablet, Rfl: 2    pregabalin (LYRICA) 50 MG capsule, Take 1 capsule by mouth 2 (two) times daily., Disp: , Rfl:     promethazine (PHENERGAN) 25 MG tablet, Take 25 mg by mouth 4 (four) times daily as needed., Disp: , Rfl:     simvastatin (ZOCOR) 20 MG tablet, Take 1 tablet (20 mg total) by mouth every evening., Disp: 90 tablet, Rfl: 1    bezlotoxumab (ZINPLAVA) 25 mg/mL Soln injection, 40 ml (Patient not taking: Reported on 5/13/2025), Disp: , Rfl:     clonazePAM (KLONOPIN) 0.5 MG tablet, Take 1 tablet (0.5 mg total) by mouth 2 (two) times daily as needed for Anxiety., Disp: 60 tablet, Rfl: 2    cyclobenzaprine (FLEXERIL) 10 MG tablet, Take 10 mg by mouth 3 (three) times daily. (Patient not taking: Reported on 5/13/2025), Disp: , Rfl:     fidaxomicin (DIFICID) 200 mg Tab, 20 tablets. (Patient not taking: Reported on 5/13/2025), Disp: , Rfl:     guselkumab (TREMFYA PEN) 200 mg/2 mL PnIj, 3 each. (Patient not taking: Reported on 5/13/2025), Disp: , Rfl:     HYDROcodone-acetaminophen (NORCO) 5-325 mg per tablet, 60 tablets. (Patient not taking: Reported on 5/13/2025), Disp: , Rfl:     magnesium oxide (MAG-OX) 400 mg (241.3 mg magnesium) tablet, Take 1 tablet by mouth. (Patient not taking: Reported on 5/13/2025), Disp: , Rfl:     metFORMIN (GLUCOPHAGE-XR) 500 MG ER 24hr tablet, 180 (Patient not taking:  Reported on 5/13/2025), Disp: , Rfl:     mirikizumab-mrkz (OMVOH PEN) 100 mg/mL PnIj, 2 each. (Patient not taking: Reported on 5/13/2025), Disp: , Rfl:     oxyCODONE (ROXICODONE) 15 MG Tab, Take 1 tablet (15 mg total) by mouth every 6 (six) hours as needed for Pain. (Patient not taking: Reported on 5/13/2025), Disp: 20 tablet, Rfl: 0    oxyCODONE-acetaminophen (PERCOCET)  mg per tablet, Take 1 tablet by mouth every 4 (four) hours as needed. (Patient not taking: Reported on 5/13/2025), Disp: , Rfl:     semaglutide (OZEMPIC) 0.25 mg or 0.5 mg(2 mg/1.5 mL) pen injector, 1 (Patient not taking: Reported on 5/13/2025), Disp: , Rfl:     zolpidem (AMBIEN) 10 mg Tab, Take 1 tablet (10 mg total) by mouth nightly as needed (insomnia)., Disp: 30 tablet, Rfl: 2  No current facility-administered medications for this visit.    Facility-Administered Medications Ordered in Other Visits:     lactated ringers infusion, , Intravenous, Continuous, David Millan MD, Last Rate: 75 mL/hr at 03/21/23 1252, New Bag at 03/21/23 1252

## 2025-05-14 ENCOUNTER — PATIENT MESSAGE (OUTPATIENT)
Dept: FAMILY MEDICINE | Facility: CLINIC | Age: 50
End: 2025-05-14
Payer: COMMERCIAL

## 2025-05-14 PROBLEM — I70.0 ATHEROSCLEROSIS OF AORTA: Status: ACTIVE | Noted: 2025-05-14

## 2025-05-14 PROBLEM — Z90.49 STATUS POST TOTAL COLECTOMY: Status: ACTIVE | Noted: 2025-05-14

## 2025-05-15 ENCOUNTER — TELEPHONE (OUTPATIENT)
Dept: FAMILY MEDICINE | Facility: CLINIC | Age: 50
End: 2025-05-15
Payer: COMMERCIAL

## 2025-05-15 ENCOUNTER — RESULTS FOLLOW-UP (OUTPATIENT)
Dept: FAMILY MEDICINE | Facility: CLINIC | Age: 50
End: 2025-05-15
Payer: COMMERCIAL

## 2025-05-15 DIAGNOSIS — S32.010D CLOSED WEDGE COMPRESSION FRACTURE OF L1 VERTEBRA WITH ROUTINE HEALING, SUBSEQUENT ENCOUNTER: ICD-10-CM

## 2025-05-15 DIAGNOSIS — R10.84 GENERALIZED ABDOMINAL PAIN: ICD-10-CM

## 2025-05-15 DIAGNOSIS — D72.829 LEUKOCYTOSIS, UNSPECIFIED TYPE: Primary | ICD-10-CM

## 2025-05-15 DIAGNOSIS — R53.1 WEAKNESS GENERALIZED: Primary | ICD-10-CM

## 2025-05-15 DIAGNOSIS — S22.080D CLOSED WEDGE COMPRESSION FRACTURE OF T12 VERTEBRA WITH ROUTINE HEALING, SUBSEQUENT ENCOUNTER: ICD-10-CM

## 2025-05-15 DIAGNOSIS — Z90.49 STATUS POST TOTAL COLECTOMY: ICD-10-CM

## 2025-05-15 NOTE — TELEPHONE ENCOUNTER
I was under the impression he was getting PT with his home health.  If he is not we can order it.

## 2025-05-15 NOTE — TELEPHONE ENCOUNTER
Can you ask him if it is [facility] in [facility], [place], [facility], [place], or [place]

## 2025-05-15 NOTE — TELEPHONE ENCOUNTER
Patient is status post total colectomy and is experiencing generalized weakness.  He feels ok but has no energy and finds it hard to get moving.  His legs feel weak.  I think he would benefit from some home PT as he cannot drive himself to appointments

## 2025-05-19 ENCOUNTER — HOSPITAL ENCOUNTER (EMERGENCY)
Facility: HOSPITAL | Age: 50
Discharge: ANOTHER HOSPITAL | End: 2025-05-19
Attending: EMERGENCY MEDICINE
Payer: COMMERCIAL

## 2025-05-19 VITALS
HEART RATE: 81 BPM | BODY MASS INDEX: 41.46 KG/M2 | OXYGEN SATURATION: 96 % | SYSTOLIC BLOOD PRESSURE: 127 MMHG | RESPIRATION RATE: 16 BRPM | HEIGHT: 66 IN | TEMPERATURE: 99 F | DIASTOLIC BLOOD PRESSURE: 58 MMHG | WEIGHT: 258 LBS

## 2025-05-19 DIAGNOSIS — G89.18 POST-OP PAIN: Primary | ICD-10-CM

## 2025-05-19 LAB
ABSOLUTE EOSINOPHIL (SMH): 0.23 K/UL
ABSOLUTE MONOCYTE (SMH): 1.14 K/UL (ref 0.3–1)
ABSOLUTE NEUTROPHIL COUNT (SMH): 14.1 K/UL (ref 1.8–7.7)
ALBUMIN SERPL-MCNC: 3.4 G/DL (ref 3.5–5.2)
ALP SERPL-CCNC: 84 UNIT/L (ref 55–135)
ALT SERPL-CCNC: 15 UNIT/L (ref 10–44)
ANION GAP (SMH): 8 MMOL/L (ref 8–16)
AST SERPL-CCNC: 18 UNIT/L (ref 10–40)
BASOPHILS # BLD AUTO: 0.05 K/UL
BASOPHILS NFR BLD AUTO: 0.3 %
BILIRUB SERPL-MCNC: 0.3 MG/DL (ref 0.1–1)
BUN SERPL-MCNC: 15 MG/DL (ref 6–20)
CALCIUM SERPL-MCNC: 9.4 MG/DL (ref 8.7–10.5)
CHLORIDE SERPL-SCNC: 106 MMOL/L (ref 95–110)
CO2 SERPL-SCNC: 27 MMOL/L (ref 23–29)
CREAT SERPL-MCNC: 1 MG/DL (ref 0.5–1.4)
ERYTHROCYTE [DISTWIDTH] IN BLOOD BY AUTOMATED COUNT: 17.3 % (ref 11.5–14.5)
GFR SERPLBLD CREATININE-BSD FMLA CKD-EPI: >60 ML/MIN/1.73/M2
GLUCOSE SERPL-MCNC: 86 MG/DL (ref 70–110)
HCT VFR BLD AUTO: 34 % (ref 40–54)
HGB BLD-MCNC: 10.1 GM/DL (ref 14–18)
IMM GRANULOCYTES # BLD AUTO: 0.21 K/UL (ref 0–0.04)
IMM GRANULOCYTES NFR BLD AUTO: 1.2 % (ref 0–0.5)
LYMPHOCYTES # BLD AUTO: 2.19 K/UL (ref 1–4.8)
MCH RBC QN AUTO: 25 PG (ref 27–31)
MCHC RBC AUTO-ENTMCNC: 29.7 G/DL (ref 32–36)
MCV RBC AUTO: 84 FL (ref 82–98)
NUCLEATED RBC (/100WBC) (SMH): 0 /100 WBC
PLATELET # BLD AUTO: 314 K/UL (ref 150–450)
PMV BLD AUTO: 9.8 FL (ref 9.2–12.9)
POTASSIUM SERPL-SCNC: 4.2 MMOL/L (ref 3.5–5.1)
PROT SERPL-MCNC: 7.3 GM/DL (ref 6–8.4)
RBC # BLD AUTO: 4.04 M/UL (ref 4.6–6.2)
RELATIVE EOSINOPHIL (SMH): 1.3 % (ref 0–8)
RELATIVE LYMPHOCYTE (SMH): 12.2 % (ref 18–48)
RELATIVE MONOCYTE (SMH): 6.4 % (ref 4–15)
RELATIVE NEUTROPHIL (SMH): 78.6 % (ref 38–73)
SODIUM SERPL-SCNC: 141 MMOL/L (ref 136–145)
WBC # BLD AUTO: 17.91 K/UL (ref 3.9–12.7)

## 2025-05-19 PROCEDURE — 96365 THER/PROPH/DIAG IV INF INIT: CPT

## 2025-05-19 PROCEDURE — 85025 COMPLETE CBC W/AUTO DIFF WBC: CPT | Performed by: EMERGENCY MEDICINE

## 2025-05-19 PROCEDURE — 25000003 PHARM REV CODE 250: Performed by: EMERGENCY MEDICINE

## 2025-05-19 PROCEDURE — 99285 EMERGENCY DEPT VISIT HI MDM: CPT | Mod: 25

## 2025-05-19 PROCEDURE — 63600175 PHARM REV CODE 636 W HCPCS: Performed by: EMERGENCY MEDICINE

## 2025-05-19 PROCEDURE — 82040 ASSAY OF SERUM ALBUMIN: CPT | Performed by: EMERGENCY MEDICINE

## 2025-05-19 RX ORDER — OXYCODONE HYDROCHLORIDE 10 MG/1
10 TABLET ORAL 3 TIMES DAILY PRN
Refills: 0 | Status: DISCONTINUED | OUTPATIENT
Start: 2025-05-19 | End: 2025-05-19 | Stop reason: HOSPADM

## 2025-05-19 RX ORDER — CLONAZEPAM 0.5 MG/1
0.5 TABLET ORAL 2 TIMES DAILY PRN
Status: DISCONTINUED | OUTPATIENT
Start: 2025-05-19 | End: 2025-05-19 | Stop reason: HOSPADM

## 2025-05-19 RX ORDER — HYOSCYAMINE SULFATE 0.12 MG/1
0.38 TABLET SUBLINGUAL 2 TIMES DAILY
Status: DISCONTINUED | OUTPATIENT
Start: 2025-05-19 | End: 2025-05-19 | Stop reason: HOSPADM

## 2025-05-19 RX ORDER — PREDNISONE 5 MG/1
10 TABLET ORAL DAILY
Status: DISCONTINUED | OUTPATIENT
Start: 2025-05-19 | End: 2025-05-19 | Stop reason: HOSPADM

## 2025-05-19 RX ORDER — METOPROLOL SUCCINATE 50 MG/1
50 TABLET, EXTENDED RELEASE ORAL DAILY
Status: DISCONTINUED | OUTPATIENT
Start: 2025-05-19 | End: 2025-05-19 | Stop reason: HOSPADM

## 2025-05-19 RX ORDER — BUSPIRONE HYDROCHLORIDE 5 MG/1
15 TABLET ORAL 3 TIMES DAILY
Status: DISCONTINUED | OUTPATIENT
Start: 2025-05-19 | End: 2025-05-19 | Stop reason: HOSPADM

## 2025-05-19 RX ORDER — GABAPENTIN 100 MG/1
100 CAPSULE ORAL 3 TIMES DAILY
Status: DISCONTINUED | OUTPATIENT
Start: 2025-05-19 | End: 2025-05-19 | Stop reason: HOSPADM

## 2025-05-19 RX ORDER — PANTOPRAZOLE SODIUM 40 MG/1
40 TABLET, DELAYED RELEASE ORAL 2 TIMES DAILY
Status: DISCONTINUED | OUTPATIENT
Start: 2025-05-19 | End: 2025-05-19 | Stop reason: HOSPADM

## 2025-05-19 RX ADMIN — HYOSCYAMINE SULFATE 0.38 MG: 0.12 TABLET SUBLINGUAL at 12:05

## 2025-05-19 RX ADMIN — PANTOPRAZOLE SODIUM 40 MG: 40 TABLET, DELAYED RELEASE ORAL at 12:05

## 2025-05-19 RX ADMIN — CLONAZEPAM 0.5 MG: 0.5 TABLET ORAL at 12:05

## 2025-05-19 RX ADMIN — PROMETHAZINE HYDROCHLORIDE 12.5 MG: 25 INJECTION INTRAMUSCULAR; INTRAVENOUS at 02:05

## 2025-05-19 RX ADMIN — APIXABAN 5 MG: 5 TABLET, FILM COATED ORAL at 12:05

## 2025-05-19 RX ADMIN — PREDNISONE 10 MG: 5 TABLET ORAL at 12:05

## 2025-05-19 RX ADMIN — GABAPENTIN 100 MG: 100 CAPSULE ORAL at 02:05

## 2025-05-19 RX ADMIN — OXYCODONE HYDROCHLORIDE 10 MG: 10 TABLET ORAL at 12:05

## 2025-05-19 RX ADMIN — BUSPIRONE HYDROCHLORIDE 15 MG: 5 TABLET ORAL at 02:05

## 2025-05-19 RX ADMIN — METOPROLOL SUCCINATE 50 MG: 50 TABLET, EXTENDED RELEASE ORAL at 12:05

## 2025-05-19 NOTE — ED PROVIDER NOTES
"Encounter Date: 5/19/2025       History     Chief Complaint   Patient presents with    ostomy complication     Colostomy site "more red and lumpy around site"  when changing bag this morning.  Homecare recommended coming to ER.      50-year-old male with a recent total abdominal colectomy at [facility] presents with ostomy complaint.  States since yesterday his ostomy has been tender and more red than usual.  He is on cefdinir.  He is on steroids.  He reports a recent elevated white count.  He states he is supposed to have an abdominal CT scan done tomorrow.  He does not have follow up with [facility] General surgery for several more weeks.      The history is provided by the patient.     Review of patient's allergies indicates:  No Known Allergies  Past Medical History:   Diagnosis Date    C. difficile colitis     Cavitary pneumonia 11/2023    pos aspergillus serology    Diabetes mellitus 01/2022    Hyperlipidemia 2015    Hypertension 2015    Insomnia 2015    Morbid obesity 03/05/2025    Ulcerative colitis      Past Surgical History:   Procedure Laterality Date    BRONCHOSCOPY WITH FLUOROSCOPY Left 11/20/2023    Procedure: BRONCHOSCOPY, WITH FLUOROSCOPY;  Surgeon: Lisa Villarreal MD;  Location: [facility] ENDO;  Service: Pulmonary;  Laterality: Left;    BRONCHOSCOPY WITH FLUOROSCOPY N/A 11/30/2023    Procedure: BRONCHOSCOPY, WITH FLUOROSCOPY;  Surgeon: Lisa Villarreal MD;  Location: [facility] ENDO;  Service: Pulmonary;  Laterality: N/A;    CARDIAC ELECTROPHYSIOLOGY MAPPING AND ABLATION  2017    SVT    CHOLECYSTECTOMY  2011    COLON SURGERY      COLONOSCOPY N/A 05/03/2022    Procedure: COLONOSCOPY;  Surgeon: Shan Jean III, MD;  Location: [facility] ENDO;  Service: Endoscopy;  Laterality: N/A;    COLONOSCOPY N/A 03/16/2024    Procedure: COLONOSCOPY;  Surgeon: ALEKSANDER Ramos MD;  Location: [facility] ENDO;  Service: Endoscopy;  Laterality: N/A;    COLONOSCOPY N/A 01/17/2025    Procedure: COLONOSCOPY;  Surgeon: " Russ Rowe MD;  Location: [facility];  Service: Endoscopy;  Laterality: N/A;    COLONOSCOPY WITH FECAL MICROBIOTA TRANSFER N/A 03/21/2023    Procedure: COLONOSCOPY, WITH FECAL MICROBIOTA TRANSFER;  Surgeon: David Millan MD;  Location: [facility];  Service: Endoscopy;  Laterality: N/A;    ESOPHAGOGASTRODUODENOSCOPY N/A 04/24/2023    Procedure: EGD (ESOPHAGOGASTRODUODENOSCOPY);  Surgeon: Shan Jean III, MD;  Location: [facility];  Service: Endoscopy;  Laterality: N/A;    ESOPHAGOGASTRODUODENOSCOPY N/A 06/05/2024    Procedure: EGD (ESOPHAGOGASTRODUODENOSCOPY);  Surgeon: Odalis Mccabe MD;  Location: [facility];  Service: Endoscopy;  Laterality: N/A;    FLEXIBLE SIGMOIDOSCOPY N/A 06/05/2024    Procedure: SIGMOIDOSCOPY, FLEXIBLE;  Surgeon: Odalis Mccabe MD;  Location: [facility];  Service: Endoscopy;  Laterality: N/A;    FLEXIBLE SIGMOIDOSCOPY N/A 07/16/2024    Procedure: SIGMOIDOSCOPY, FLEXIBLE;  Surgeon: Shan Jean III, MD;  Location: [facility];  Service: Endoscopy;  Laterality: N/A;    FLEXIBLE SIGMOIDOSCOPY N/A 08/13/2024    Procedure: SIGMOIDOSCOPY, FLEXIBLE;  Surgeon: Shan Jean III, MD;  Location: [facility];  Service: Endoscopy;  Laterality: N/A;    GANGLION CYST EXCISION Left 1992    MAGNETIC RESONANCE IMAGING N/A 03/07/2025    Procedure: MRI (MAGNETIC RESONANCE IMAGING);  Surgeon: Nish, [facility];  Location: [facility];  Service: General;  Laterality: N/A;    UMBILICAL HERNIA REPAIR  2011    with mesh     Family History   Problem Relation Name Age of Onset    Asthma Mother       Social History[1]  Review of Systems    Physical Exam     Initial Vitals [05/19/25 0922]   BP Pulse Resp Temp SpO2   (!) 174/116 102 18 98.3 °F (36.8 °C) 99 %      MAP       --         Physical Exam    Nursing note and vitals reviewed.  Constitutional: He appears well-developed and well-nourished. He is not diaphoretic.  Non-toxic appearance. He does not have a sickly appearance.  He does not appear ill. No distress.   HENT:   Head: Normocephalic and atraumatic.   Eyes: EOM are normal.   Neck: Neck supple.   Normal range of motion.  Pulmonary/Chest: No respiratory distress.   Abdominal: There is no abdominal tenderness.   right lower quadrant ostomy, no surrounding erythema, minimally tender   Musculoskeletal:         General: Normal range of motion.      Cervical back: Normal range of motion and neck supple. No rigidity. Normal range of motion.     Neurological: He is alert and oriented to person, place, and time.   Skin: Skin is warm and dry. No rash noted.   Psychiatric: He has a normal mood and affect. His behavior is normal. Judgment and thought content normal.         ED Course   Procedures  Labs Reviewed   COMPREHENSIVE METABOLIC PANEL - Abnormal       Result Value    Sodium 141      Potassium 4.2      Chloride 106      CO2 27      Glucose 86      BUN 15      Creatinine 1.0      Calcium 9.4      Protein Total 7.3      Albumin 3.4 (*)     Bilirubin Total 0.3      ALP 84      AST 18      ALT 15      Anion Gap 8      eGFR >60     CBC WITH DIFFERENTIAL - Abnormal    WBC 17.91 (*)     RBC 4.04 (*)     Hgb 10.1 (*)     Hct 34.0 (*)     MCV 84      MCH 25.0 (*)     MCHC 29.7 (*)     RDW 17.3 (*)     Platelet Count 314      MPV 9.8      Nucleated RBC 0      Neut % 78.6 (*)     Lymph % 12.2 (*)     Mono % 6.4      Eos % 1.3      Basophil % 0.3      Imm Grans % 1.2 (*)     Neut # 14.1 (*)     Lymph # 2.19      Mono # 1.14 (*)     Eos # 0.23      Baso # 0.05      Imm Grans # 0.21 (*)    CBC W/ AUTO DIFFERENTIAL    Narrative:     The following orders were created for panel order CBC auto differential.  Procedure                               Abnormality         Status                     ---------                               -----------         ------                     CBC with Differential[[id number]]       Abnormal            Final result                 Please view results for these tests  on the individual orders.          Imaging Results    None          Medications   apixaban tablet 5 mg (5 mg Oral Given 5/19/25 1201)   busPIRone tablet 15 mg (has no administration in time range)   clonazePAM tablet 0.5 mg (0.5 mg Oral Given 5/19/25 1201)   gabapentin capsule 100 mg (has no administration in time range)   hyoscyamine SL tablet 0.375 mg (0.375 mg Sublingual Given 5/19/25 1214)   metoprolol succinate (TOPROL-XL) 24 hr tablet 50 mg (50 mg Oral Given 5/19/25 1202)   pantoprazole EC tablet 40 mg (40 mg Oral Given 5/19/25 1202)   predniSONE tablet 10 mg (10 mg Oral Given 5/19/25 1202)   oxyCODONE immediate release tablet Tab 10 mg (10 mg Oral Given 5/19/25 1201)     Medical Decision Making  50-year-old male with ulcerative colitis known to me from multiple prior ER visits presents after a total colectomy at [facility].  This was done few weeks ago.  He reports increasing pain and erythema that is ostomy site.  Ostomy site appears normal to me.  He is complaining of chronic abdominal pain.  Physical exam of the abdomen is not consistent with any surgical emergency I do not see any reason for an emergent CT scan of the abdomen.  Patient has been accepted back to [facility] by his colorectal surgeon.  Pending transfer back to that facility at this time.  I wrote standing orders for his usual home medications.    Amount and/or Complexity of Data Reviewed  External Data Reviewed: labs, radiology and notes.  Labs: ordered.    Risk  Prescription drug management.  Decision regarding hospitalization.               ED Course as of 05/19/25 1326   Mon May 19, 2025   0927 BP(!): 174/116 [EF]   0927 Temp: 98.3 °F (36.8 °C) [EF]   0927 Temp Source: Oral [EF]   0927 Pulse: 102 [EF]   0927 Resp: 18 [EF]   0927 SpO2: 99 % [EF]   1016 Accepted at  by his surgeon dr mora [EF]      ED Course User Index  [EF] Marcelino Garcia MD                           Clinical Impression:  Final diagnoses:  [G89.18]  Post-op pain (Primary)          ED Disposition Condition    Transfer to Another Facility Stable                    [1]   Social History  Tobacco Use    Smoking status: Former     Average packs/day: 1 pack/day for 30.0 years (30.0 ttl pk-yrs)     Types: Cigarettes     Start date: 1993    Smokeless tobacco: Never    Tobacco comments:     Pt states he has stopped smoking with Chantix three weeks ago. 12/1/23   Substance Use Topics    Alcohol use: Yes     Comment: ocass    Drug use: Not Currently        Marcelino Garcia MD  05/19/25 2818

## 2025-06-05 ENCOUNTER — HOSPITAL ENCOUNTER (EMERGENCY)
Facility: HOSPITAL | Age: 50
Discharge: ANOTHER HOSPITAL | End: 2025-06-05
Attending: EMERGENCY MEDICINE
Payer: COMMERCIAL

## 2025-06-05 VITALS
TEMPERATURE: 98 F | SYSTOLIC BLOOD PRESSURE: 108 MMHG | HEART RATE: 81 BPM | HEIGHT: 66 IN | RESPIRATION RATE: 18 BRPM | BODY MASS INDEX: 42.59 KG/M2 | WEIGHT: 265 LBS | OXYGEN SATURATION: 99 % | DIASTOLIC BLOOD PRESSURE: 69 MMHG

## 2025-06-05 DIAGNOSIS — Z43.3 COLOSTOMY CARE: ICD-10-CM

## 2025-06-05 DIAGNOSIS — T81.42XD INFECTION OF DEEP INCISIONAL SURGICAL SITE AFTER PROCEDURE, SUBSEQUENT ENCOUNTER: Primary | ICD-10-CM

## 2025-06-05 LAB
ABSOLUTE EOSINOPHIL (SMH): 0.31 K/UL
ABSOLUTE MONOCYTE (SMH): 1.37 K/UL (ref 0.3–1)
ABSOLUTE NEUTROPHIL COUNT (SMH): 8.8 K/UL (ref 1.8–7.7)
ALBUMIN SERPL-MCNC: 3.6 G/DL (ref 3.5–5.2)
ALP SERPL-CCNC: 72 UNIT/L (ref 55–135)
ALT SERPL-CCNC: 10 UNIT/L (ref 10–44)
AMPHET UR QL SCN: NEGATIVE
ANION GAP (SMH): 7 MMOL/L (ref 8–16)
APTT PPP: <21 SECONDS (ref 21–32)
AST SERPL-CCNC: 15 UNIT/L (ref 10–40)
BARBITURATE SCN PRESENT UR: NEGATIVE
BASOPHILS # BLD AUTO: 0.09 K/UL
BASOPHILS NFR BLD AUTO: 0.7 %
BENZODIAZ UR QL SCN: ABNORMAL
BILIRUB SERPL-MCNC: 0.3 MG/DL (ref 0.1–1)
BILIRUB UR QL STRIP.AUTO: NEGATIVE
BNP SERPL-MCNC: 30 PG/ML
BUN SERPL-MCNC: 9 MG/DL (ref 6–20)
C-REACTIVE PROTEIN (SMH): 2.2 MG/DL
CALCIUM SERPL-MCNC: 9.7 MG/DL (ref 8.7–10.5)
CANNABINOIDS UR QL SCN: NEGATIVE
CHLORIDE SERPL-SCNC: 104 MMOL/L (ref 95–110)
CK SERPL-CCNC: 18 U/L (ref 20–200)
CLARITY UR: CLEAR
CO2 SERPL-SCNC: 25 MMOL/L (ref 23–29)
COCAINE UR QL SCN: NEGATIVE
COLOR UR AUTO: YELLOW
CREAT SERPL-MCNC: 1.1 MG/DL (ref 0.5–1.4)
CREAT UR-MCNC: 125.7 MG/DL (ref 23–375)
ERYTHROCYTE [DISTWIDTH] IN BLOOD BY AUTOMATED COUNT: 18.4 % (ref 11.5–14.5)
GFR SERPLBLD CREATININE-BSD FMLA CKD-EPI: >60 ML/MIN/1.73/M2
GLUCOSE SERPL-MCNC: 94 MG/DL (ref 70–110)
GLUCOSE UR QL STRIP: NEGATIVE
HCT VFR BLD AUTO: 33.9 % (ref 40–54)
HGB BLD-MCNC: 10 GM/DL (ref 14–18)
HGB UR QL STRIP: NEGATIVE
HYALINE CASTS UR QL AUTO: 1 /LPF (ref 0–1)
IMM GRANULOCYTES # BLD AUTO: 0.11 K/UL (ref 0–0.04)
IMM GRANULOCYTES NFR BLD AUTO: 0.9 % (ref 0–0.5)
INDIRECT COOMBS: NORMAL
INR PPP: 1 (ref 0.8–1.2)
KETONES UR QL STRIP: NEGATIVE
LACTATE SERPL-SCNC: 0.7 MMOL/L (ref 0.5–1.9)
LDH SERPL L TO P-CCNC: 1.81 MMOL/L (ref 0.5–2.2)
LEUKOCYTE ESTERASE UR QL STRIP: ABNORMAL
LIPASE SERPL-CCNC: 12 U/L (ref 4–60)
LYMPHOCYTES # BLD AUTO: 2.07 K/UL (ref 1–4.8)
MAGNESIUM SERPL-MCNC: 1.4 MG/DL (ref 1.6–2.6)
MCH RBC QN AUTO: 25.1 PG (ref 27–31)
MCHC RBC AUTO-ENTMCNC: 29.5 G/DL (ref 32–36)
MCV RBC AUTO: 85 FL (ref 82–98)
MICROSCOPIC COMMENT: ABNORMAL
NITRITE UR QL STRIP: NEGATIVE
NUCLEATED RBC (/100WBC) (SMH): 0 /100 WBC
OB PNL STL: NEGATIVE
OPIATES UR QL SCN: ABNORMAL
PCP UR QL: NEGATIVE
PH UR STRIP: 6 [PH]
PLATELET # BLD AUTO: 338 K/UL (ref 150–450)
PMV BLD AUTO: 10 FL (ref 9.2–12.9)
POCT GLUCOSE: 98 MG/DL (ref 70–110)
POTASSIUM SERPL-SCNC: 3.9 MMOL/L (ref 3.5–5.1)
PROT SERPL-MCNC: 7.8 GM/DL (ref 6–8.4)
PROT UR QL STRIP: NEGATIVE
PROTHROMBIN TIME: 11.4 SECONDS (ref 9–12.5)
RBC # BLD AUTO: 3.98 M/UL (ref 4.6–6.2)
RBC #/AREA URNS AUTO: 1 /HPF
RELATIVE EOSINOPHIL (SMH): 2.4 % (ref 0–8)
RELATIVE LYMPHOCYTE (SMH): 16.2 % (ref 18–48)
RELATIVE MONOCYTE (SMH): 10.7 % (ref 4–15)
RELATIVE NEUTROPHIL (SMH): 69.1 % (ref 38–73)
RH BLD: NORMAL
SAMPLE: NORMAL
SODIUM SERPL-SCNC: 136 MMOL/L (ref 136–145)
SP GR UR STRIP: >=1.03
SPECIMEN OUTDATE: NORMAL
SQUAMOUS #/AREA URNS AUTO: <1 /HPF
TROPONIN HIGH SENSITIVE (SMH): 3.7 PG/ML
TROPONIN HIGH SENSITIVE (SMH): 5.2 PG/ML
TSH SERPL-ACNC: 1.92 UIU/ML (ref 0.34–5.6)
UROBILINOGEN UR STRIP-ACNC: NEGATIVE EU/DL
WBC # BLD AUTO: 12.75 K/UL (ref 3.9–12.7)
WBC #/AREA URNS AUTO: 7 /HPF

## 2025-06-05 PROCEDURE — 84484 ASSAY OF TROPONIN QUANT: CPT | Performed by: EMERGENCY MEDICINE

## 2025-06-05 PROCEDURE — 96367 TX/PROPH/DG ADDL SEQ IV INF: CPT

## 2025-06-05 PROCEDURE — 86850 RBC ANTIBODY SCREEN: CPT | Performed by: NURSE PRACTITIONER

## 2025-06-05 PROCEDURE — 63600175 PHARM REV CODE 636 W HCPCS: Performed by: EMERGENCY MEDICINE

## 2025-06-05 PROCEDURE — 84443 ASSAY THYROID STIM HORMONE: CPT | Performed by: EMERGENCY MEDICINE

## 2025-06-05 PROCEDURE — 36415 COLL VENOUS BLD VENIPUNCTURE: CPT | Performed by: NURSE PRACTITIONER

## 2025-06-05 PROCEDURE — 96376 TX/PRO/DX INJ SAME DRUG ADON: CPT

## 2025-06-05 PROCEDURE — 96366 THER/PROPH/DIAG IV INF ADDON: CPT

## 2025-06-05 PROCEDURE — 83735 ASSAY OF MAGNESIUM: CPT | Performed by: EMERGENCY MEDICINE

## 2025-06-05 PROCEDURE — 99285 EMERGENCY DEPT VISIT HI MDM: CPT | Mod: 25

## 2025-06-05 PROCEDURE — 83690 ASSAY OF LIPASE: CPT | Performed by: EMERGENCY MEDICINE

## 2025-06-05 PROCEDURE — 96375 TX/PRO/DX INJ NEW DRUG ADDON: CPT

## 2025-06-05 PROCEDURE — 82550 ASSAY OF CK (CPK): CPT | Performed by: EMERGENCY MEDICINE

## 2025-06-05 PROCEDURE — 93005 ELECTROCARDIOGRAM TRACING: CPT | Performed by: INTERNAL MEDICINE

## 2025-06-05 PROCEDURE — 25000003 PHARM REV CODE 250: Performed by: EMERGENCY MEDICINE

## 2025-06-05 PROCEDURE — 82040 ASSAY OF SERUM ALBUMIN: CPT | Performed by: NURSE PRACTITIONER

## 2025-06-05 PROCEDURE — 85730 THROMBOPLASTIN TIME PARTIAL: CPT | Performed by: NURSE PRACTITIONER

## 2025-06-05 PROCEDURE — 83605 ASSAY OF LACTIC ACID: CPT | Performed by: NURSE PRACTITIONER

## 2025-06-05 PROCEDURE — 81001 URINALYSIS AUTO W/SCOPE: CPT | Performed by: EMERGENCY MEDICINE

## 2025-06-05 PROCEDURE — 96361 HYDRATE IV INFUSION ADD-ON: CPT

## 2025-06-05 PROCEDURE — 96365 THER/PROPH/DIAG IV INF INIT: CPT

## 2025-06-05 PROCEDURE — 87040 BLOOD CULTURE FOR BACTERIA: CPT | Performed by: NURSE PRACTITIONER

## 2025-06-05 PROCEDURE — 93010 ELECTROCARDIOGRAM REPORT: CPT | Mod: ,,, | Performed by: INTERNAL MEDICINE

## 2025-06-05 PROCEDURE — 85025 COMPLETE CBC W/AUTO DIFF WBC: CPT | Performed by: NURSE PRACTITIONER

## 2025-06-05 PROCEDURE — 80307 DRUG TEST PRSMV CHEM ANLYZR: CPT | Performed by: EMERGENCY MEDICINE

## 2025-06-05 PROCEDURE — 83880 ASSAY OF NATRIURETIC PEPTIDE: CPT | Performed by: EMERGENCY MEDICINE

## 2025-06-05 PROCEDURE — 25500020 PHARM REV CODE 255: Performed by: EMERGENCY MEDICINE

## 2025-06-05 PROCEDURE — 86140 C-REACTIVE PROTEIN: CPT | Performed by: EMERGENCY MEDICINE

## 2025-06-05 PROCEDURE — 82962 GLUCOSE BLOOD TEST: CPT

## 2025-06-05 PROCEDURE — 85610 PROTHROMBIN TIME: CPT | Performed by: NURSE PRACTITIONER

## 2025-06-05 PROCEDURE — 82272 OCCULT BLD FECES 1-3 TESTS: CPT | Performed by: NURSE PRACTITIONER

## 2025-06-05 RX ORDER — ACETAMINOPHEN 500 MG
1000 TABLET ORAL EVERY 6 HOURS PRN
COMMUNITY
Start: 2025-05-31 | End: 2025-06-07

## 2025-06-05 RX ORDER — MORPHINE SULFATE 2 MG/ML
2 INJECTION, SOLUTION INTRAMUSCULAR; INTRAVENOUS
Refills: 0 | Status: COMPLETED | OUTPATIENT
Start: 2025-06-05 | End: 2025-06-05

## 2025-06-05 RX ORDER — ONDANSETRON HYDROCHLORIDE 2 MG/ML
4 INJECTION, SOLUTION INTRAVENOUS
Status: COMPLETED | OUTPATIENT
Start: 2025-06-05 | End: 2025-06-05

## 2025-06-05 RX ORDER — ESCITALOPRAM OXALATE 10 MG/1
10 TABLET ORAL DAILY
COMMUNITY

## 2025-06-05 RX ORDER — MAGNESIUM SULFATE HEPTAHYDRATE 40 MG/ML
2 INJECTION, SOLUTION INTRAVENOUS ONCE
Status: COMPLETED | OUTPATIENT
Start: 2025-06-05 | End: 2025-06-05

## 2025-06-05 RX ORDER — OXYCODONE HYDROCHLORIDE 5 MG/1
5 TABLET ORAL EVERY 6 HOURS PRN
COMMUNITY
Start: 2025-05-31 | End: 2025-06-05

## 2025-06-05 RX ORDER — DIPHENHYDRAMINE HCL 25 MG
25 CAPSULE ORAL EVERY 6 HOURS PRN
COMMUNITY
Start: 2025-05-31 | End: 2025-06-10

## 2025-06-05 RX ORDER — SULFAMETHOXAZOLE AND TRIMETHOPRIM 800; 160 MG/1; MG/1
2 TABLET ORAL 2 TIMES DAILY
COMMUNITY
Start: 2025-05-31 | End: 2025-06-06

## 2025-06-05 RX ADMIN — MORPHINE SULFATE 2 MG: 2 INJECTION, SOLUTION INTRAMUSCULAR; INTRAVENOUS at 04:06

## 2025-06-05 RX ADMIN — IOHEXOL 100 ML: 350 INJECTION, SOLUTION INTRAVENOUS at 05:06

## 2025-06-05 RX ADMIN — PIPERACILLIN SODIUM AND TAZOBACTAM SODIUM 4.5 G: 4; .5 INJECTION, POWDER, LYOPHILIZED, FOR SOLUTION INTRAVENOUS at 09:06

## 2025-06-05 RX ADMIN — ONDANSETRON 4 MG: 2 INJECTION INTRAMUSCULAR; INTRAVENOUS at 04:06

## 2025-06-05 RX ADMIN — ONDANSETRON 4 MG: 2 INJECTION INTRAMUSCULAR; INTRAVENOUS at 09:06

## 2025-06-05 RX ADMIN — MAGNESIUM SULFATE HEPTAHYDRATE 2 G: 40 INJECTION, SOLUTION INTRAVENOUS at 07:06

## 2025-06-05 RX ADMIN — MORPHINE SULFATE 2 MG: 2 INJECTION, SOLUTION INTRAMUSCULAR; INTRAVENOUS at 09:06

## 2025-06-05 RX ADMIN — VANCOMYCIN HYDROCHLORIDE 2000 MG: 2 INJECTION, POWDER, LYOPHILIZED, FOR SOLUTION INTRAVENOUS at 09:06

## 2025-06-05 RX ADMIN — Medication: at 10:06

## 2025-06-05 RX ADMIN — SODIUM CHLORIDE 1000 ML: 9 INJECTION, SOLUTION INTRAVENOUS at 04:06

## 2025-06-05 NOTE — FIRST PROVIDER EVALUATION
Emergency Department TeleTriage Encounter Note      CHIEF COMPLAINT    Chief Complaint   Patient presents with    GI Problem     C/o dark, tarry stools, sore at stoma area.       VITAL SIGNS   Initial Vitals [06/05/25 1207]   BP Pulse Resp Temp SpO2   116/84 110 18 98 °F (36.7 °C) 100 %      MAP       --            ALLERGIES    Review of patient's allergies indicates:  No Known Allergies    PROVIDER TRIAGE NOTE  50 year old male with history of DVT and PE' with colostomy since March 2025,  presents to the ER with complaints of back tarry stool in colostomy since Monday along with currently on Eliquis and noticing SOB with exertion and lightheadedness. Denies chest pain. Also reports next to colostomy he has red skin with sore and purulent drainage from site. No known fever.       AAOx3, respirations even and non- labored, stable vitals, normal coloration of skin, sitting upright in triage chair, appears in no acute distress.          ORDERS  Labs Reviewed - No data to display    ED Orders (720h ago, onward)      None              Virtual Visit Note: The provider triage portion of this emergency department evaluation and documentation was performed via [employer], a HIPAA-compliant telemedicine application, in concert with a tele-presenter in the room. A face to face patient evaluation with one of my colleagues will occur once the patient is placed in an emergency department room.      DISCLAIMER: This note was prepared with [employer] voice recognition transcription software. Garbled syntax, mangled pronouns, and other bizarre constructions may be attributed to that software system.

## 2025-06-05 NOTE — ED NOTES
OSTOMY WITH RED AND DRAINING AREA AT L OF STOMA.  STOMA PINK. MOIST AND EXUDING DARK LIQUID STOOL.

## 2025-06-05 NOTE — CONSULTS
06/05/25 1530   ECG   Pulse 80        Wound 06/05/25 1530 Traumatic Left medial Lower quadrant   Date First Assessed/Time First Assessed: 06/05/25 1530   Present on Original Admission: Yes  Primary Wound Type: Traumatic  Side: Left  Orientation: medial  Location: Lower quadrant   Wound Image    Dressing Appearance Open to air   Drainage Amount Moderate  (continious)   Drainage Characteristics/Odor Purulent   Appearance Pigmentation consistent with skin tone  (small opening in crease of abdomen that has formed and now draining. recurrent)   Wound Edges Open   Wound Length (cm) 0.2 cm   Wound Width (cm) 1 cm   Wound Surface Area (cm^2) 0.16 cm^2   Care Cleansed with:;Antimicrobial agent;Sterile normal saline   Dressing   (opening is at the 3 oclock position of stoma.  opening falls beneath ostomy pouch, cut opening in pouch for drainage.)   Called by ED staff to see ostomy patient. Bilateral arm dry abrasions.  Given information for [facility]

## 2025-06-06 NOTE — ED PROVIDER NOTES
"Encounter Date: 6/5/2025       History     Chief Complaint   Patient presents with    GI Problem     C/o dark, tarry stools, sore at stoma area.     This is a 50-year-old male who presents complaining of abdominal pain as well as dark stools over the past several days.  Chart review demonstrates the patient was recently discharged from [facility].  He has a history of Crohn's disease.  He underwent a total colectomy and an ileostomy placement on March 28th.  This was performed at [facility].  This was later complicated by a periosteal abscess.  The patient underwent IR evaluation/apparently this was not completely successful.  He is being treated with oral antibiotics and was recently discharged after being deemed stable for discharge.  Presents now as he has had increasing pain.  Is also having trouble having the ostomy bag stay on the site.  Has generalized nonfocal weakness and fatigue.  Unsure if he has had any fevers.  Reports some mild shortness of breath as well.  He states he is not sure if this is anxiety related.  Denies chest pain or pleuritic pain or discomfort.  Denies any lower extremity pain or swelling.  Denies any urinary problems or changes.  Denies any headache or visual changes.  Denies any other problems or complaints.  Discharge summary from recent discharge    "Hospital Course:   Jacoby Jean-Baptiste is a 50 y.o. male s/p  total abdominal colectomy with ileostomy placement on 03/28/2025. Earlier this month he was transferred from an OSH due to concern for an abscess vs seroma seen on CT imaging. IR placed a drain 5/19 that was draining purulent output growing Ecoli on clx. Drain was removed and pt was discharged home on 5/23. He presented back to the ED on 5/25 for drainage from past drain site. He was admitted to colorectal surgery team for further evaluation and management. IR was consulted again and deferred drain placement due too the small size of the fluid " "collection. IR reattempted to taken patient for a drain placement but size was not sufficient. ID consulted and pt was continued on IV Zosyn, but switch to Flagyl due to a potential reaction that presented as a rash. Pt remained HDN stable and improved symptomatically. On day of discharge patient was given return precautions and all of his questions and concerns were answered. He was sent home with PO Bactrim and given follow up instructions for outpatient visit"          Review of patient's allergies indicates:  No Known Allergies  Past Medical History:   Diagnosis Date    C. difficile colitis     Cavitary pneumonia 11/2023    pos aspergillus serology    Diabetes mellitus 01/2022    Hyperlipidemia 2015    Hypertension 2015    Insomnia 2015    Morbid obesity 03/05/2025    Ulcerative colitis      Past Surgical History:   Procedure Laterality Date    BRONCHOSCOPY WITH FLUOROSCOPY Left 11/20/2023    Procedure: BRONCHOSCOPY, WITH FLUOROSCOPY;  Surgeon: Lisa Villarreal MD;  Location: [facility];  Service: Pulmonary;  Laterality: Left;    BRONCHOSCOPY WITH FLUOROSCOPY N/A 11/30/2023    Procedure: BRONCHOSCOPY, WITH FLUOROSCOPY;  Surgeon: Lisa Villarreal MD;  Location: [facility];  Service: Pulmonary;  Laterality: N/A;    CARDIAC ELECTROPHYSIOLOGY MAPPING AND ABLATION  2017    SVT    CHOLECYSTECTOMY  2011    COLON SURGERY      COLONOSCOPY N/A 05/03/2022    Procedure: COLONOSCOPY;  Surgeon: Shan Jean III, MD;  Location: [facility];  Service: Endoscopy;  Laterality: N/A;    COLONOSCOPY N/A 03/16/2024    Procedure: COLONOSCOPY;  Surgeon: ALEKSANDER Ramos MD;  Location: [facility];  Service: Endoscopy;  Laterality: N/A;    COLONOSCOPY N/A 01/17/2025    Procedure: COLONOSCOPY;  Surgeon: Russ Rowe MD;  Location: [facility];  Service: Endoscopy;  Laterality: N/A;    COLONOSCOPY WITH FECAL MICROBIOTA TRANSFER N/A 03/21/2023    Procedure: COLONOSCOPY, WITH FECAL MICROBIOTA TRANSFER;  Surgeon: David" MD Monty;  Location: [facility];  Service: Endoscopy;  Laterality: N/A;    ESOPHAGOGASTRODUODENOSCOPY N/A 04/24/2023    Procedure: EGD (ESOPHAGOGASTRODUODENOSCOPY);  Surgeon: Shan Jean III, MD;  Location: [facility];  Service: Endoscopy;  Laterality: N/A;    ESOPHAGOGASTRODUODENOSCOPY N/A 06/05/2024    Procedure: EGD (ESOPHAGOGASTRODUODENOSCOPY);  Surgeon: Odalis Mccabe MD;  Location: [facility];  Service: Endoscopy;  Laterality: N/A;    FLEXIBLE SIGMOIDOSCOPY N/A 06/05/2024    Procedure: SIGMOIDOSCOPY, FLEXIBLE;  Surgeon: Odalis Mccabe MD;  Location: [facility];  Service: Endoscopy;  Laterality: N/A;    FLEXIBLE SIGMOIDOSCOPY N/A 07/16/2024    Procedure: SIGMOIDOSCOPY, FLEXIBLE;  Surgeon: Shan Jean III, MD;  Location: [facility];  Service: Endoscopy;  Laterality: N/A;    FLEXIBLE SIGMOIDOSCOPY N/A 08/13/2024    Procedure: SIGMOIDOSCOPY, FLEXIBLE;  Surgeon: Shan Jean III, MD;  Location: [facility];  Service: Endoscopy;  Laterality: N/A;    GANGLION CYST EXCISION Left 1992    MAGNETIC RESONANCE IMAGING N/A 03/07/2025    Procedure: MRI (MAGNETIC RESONANCE IMAGING);  Surgeon: Provider, [facility];  Location: [facility];  Service: General;  Laterality: N/A;    UMBILICAL HERNIA REPAIR  2011    with mesh     Family History   Problem Relation Name Age of Onset    Asthma Mother       Social History[1]  Review of Systems   Constitutional:  Positive for activity change, appetite change and fatigue. Negative for fever.   HENT: Negative.  Negative for congestion, sore throat and trouble swallowing.    Respiratory: Negative.  Negative for shortness of breath.    Cardiovascular: Negative.  Negative for chest pain.   Gastrointestinal:  Positive for abdominal distention, abdominal pain and nausea. Negative for constipation, diarrhea and vomiting. Blood in stool: Dark appearing stool.  Genitourinary:  Positive for decreased urine volume. Negative for dysuria and testicular pain.    Musculoskeletal: Negative.  Negative for back pain, gait problem, myalgias and neck pain.   Skin:  Positive for color change (reports a color change around the ostomy site) and wound (Ileostomy). Negative for rash.   Neurological: Negative.  Negative for seizures, syncope, facial asymmetry, speech difficulty, weakness, numbness and headaches.   Hematological:  Does not bruise/bleed easily.   Psychiatric/Behavioral: Negative.     All other systems reviewed and are negative.      Physical Exam     Initial Vitals [06/05/25 1207]   BP Pulse Resp Temp SpO2   116/84 110 18 98 °F (36.7 °C) 100 %      MAP       --         Physical Exam    Nursing note and vitals reviewed.  Constitutional: He is cooperative. He does not appear ill. No distress.   HENT:   Head: Normocephalic and atraumatic.   Nose: Nose normal. Mouth/Throat: Uvula is midline, oropharynx is clear and moist and mucous membranes are normal.   Eyes: Conjunctivae, EOM and lids are normal. Pupils are equal, round, and reactive to light.   Neck: Trachea normal and phonation normal. Neck supple. No stridor present. No JVD present.   Normal range of motion.   Full passive range of motion without pain.     Cardiovascular:  Normal rate, regular rhythm, normal heart sounds, intact distal pulses and normal pulses.     Exam reveals no gallop, no distant heart sounds, no friction rub and no decreased pulses.       No murmur heard.  Pulmonary/Chest: Breath sounds normal.   Abdominal: Abdomen is soft. Bowel sounds are normal. He exhibits no distension and no mass. There is generalized abdominal tenderness.   Mild diffuse tenderness, no rebound or guarding.  Ostomy bag does not appear to be adhering well, dark appearing stool noted.  Bag later removed by ostomy nurse/picture placed in chart.  Small amount of purulence noted adjacent to the ostomy   No right CVA tenderness.  No left CVA tenderness. There is no rebound and no guarding.   Musculoskeletal:         General: No  tenderness or edema. Normal range of motion.      Cervical back: Full passive range of motion without pain, normal range of motion and neck supple. No pain with movement. Normal range of motion.      Thoracic back: Normal range of motion.      Lumbar back: Normal range of motion.      Comments: Pulses 2+ throughout, no extremity abnormalities     Neurological: He is alert and oriented to person, place, and time. He has normal strength. No cranial nerve deficit or sensory deficit. Gait normal. GCS score is 15. GCS eye subscore is 4. GCS verbal subscore is 5. GCS motor subscore is 6.   No focal deficits   Skin: Skin is warm and dry. Capillary refill takes less than 2 seconds. No rash noted. There is pallor.   Psychiatric: He has a normal mood and affect. His speech is normal and behavior is normal.         ED Course   Procedures  Labs Reviewed   COMPREHENSIVE METABOLIC PANEL - Abnormal       Result Value    Sodium 136      Potassium 3.9      Chloride 104      CO2 25      Glucose 94      BUN 9      Creatinine 1.1      Calcium 9.7      Protein Total 7.8      Albumin 3.6      Bilirubin Total 0.3      ALP 72      AST 15      ALT 10      Anion Gap 7 (*)     eGFR >60     APTT - Abnormal    PTT <21.0 (*)    CBC WITH DIFFERENTIAL - Abnormal    WBC 12.75 (*)     RBC 3.98 (*)     Hgb 10.0 (*)     Hct 33.9 (*)     MCV 85      MCH 25.1 (*)     MCHC 29.5 (*)     RDW 18.4 (*)     Platelet Count 338      MPV 10.0      Nucleated RBC 0      Neut % 69.1      Lymph % 16.2 (*)     Mono % 10.7      Eos % 2.4      Basophil % 0.7      Imm Grans % 0.9 (*)     Neut # 8.8 (*)     Lymph # 2.07      Mono # 1.37 (*)     Eos # 0.31      Baso # 0.09      Imm Grans # 0.11 (*)    URINALYSIS, REFLEX TO URINE CULTURE - Abnormal    Color, UA Yellow      Appearance, UA Clear      Spec Grav UA >=1.030 (*)     pH, UA 6.0      Protein, UA Negative      Glucose, UA Negative      Ketones, UA Negative      Blood, UA Negative      Bilirubin, UA Negative       Urobilinogen, UA Negative      Nitrites, UA Negative      Leukocyte Esterase, UA Trace (*)    CK - Abnormal    CPK 18 (*)    MAGNESIUM - Abnormal    Magnesium 1.4 (*)    URINALYSIS MICROSCOPIC - Abnormal    RBC, UA 1      WBC, UA 7 (*)     Squamous Epithelial Cells, UA <1      Hyaline Casts, UA 1      Microscopic Comment       C-REACTIVE PROTEIN - Abnormal    CRP 2.20 (*)    CULTURE, BLOOD - Normal    CULTURE, BLOOD ([facility]) No Growth After 6 Hours     CULTURE, BLOOD - Normal    CULTURE, BLOOD ([facility]) No Growth After 6 Hours     PROTIME-INR - Normal    PT 11.4      INR 1.0     OCCULT BLOOD X 1, STOOL - Normal    OCCULT BLOOD STOOL Negative     LIPASE - Normal    Lipase Level 12     TSH - Normal    TSH 1.916     TROPONIN I HIGH SENSITIVITY - Normal    Troponin High Sensitive 3.7     B-TYPE NATRIURETIC PEPTIDE - Normal    BNP 30     TROPONIN I HIGH SENSITIVITY - Normal    Troponin High Sensitive 5.2     LACTIC ACID, PLASMA - Normal    Lactic Acid Level 0.7      Narrative:     Falsely low lactic acid results can be found in samples containing >=13.0 mg/dL total bilirubin and/or >=3.5 mg/dL direct bilirubin.    CBC W/ AUTO DIFFERENTIAL    Narrative:     The following orders were created for panel order CBC auto differential.  Procedure                               Abnormality         Status                     ---------                               -----------         ------                     CBC with Differential[[id number]]       Abnormal            Final result                 Please view results for these tests on the individual orders.   DRUG SCREEN PANEL, URINE EMERGENCY   EXTRA TUBES    Narrative:     The following orders were created for panel order EXTRA TUBES.  Procedure                               Abnormality         Status                     ---------                               -----------         ------                     Light Green Top Hold[[id number]]                            In process                  Lavender Top Hold[[id number]]                               In process                   Please view results for these tests on the individual orders.   LIGHT GREEN TOP HOLD   LAVENDER TOP HOLD   TYPE & SCREEN    Specimen Outdate 06/08/2025 23:59      Group & Rh A POS      Indirect Stephen NEG     POCT GLUCOSE    POCT Glucose 98     ISTAT LACTATE    POC Lactate 1.81      Sample VENOUS     POCT GLUCOSE MONITORING CONTINUOUS   POCT LACTATE        ECG Results              EKG 12-lead (In process)        Collection Time Result Time QRS Duration [facility] QTC Calculation    06/05/25 16:18:44 06/05/25 16:25:41 72 442                     In process by Interface, Lab In [facility] (06/05/25 16:25:50)                   Narrative:    Test Reason : R42,    Vent. Rate :  78 BPM     Atrial Rate :  78 BPM     P-R Int : 138 ms          QRS Dur :  72 ms      QT Int : 388 ms       P-R-T Axes :  56  32  34 degrees    QTcB Int : 442 ms    Normal sinus rhythm  Normal ECG  No previous ECGs available    Referred By: AAAREFERRAL SELF           Confirmed By:                                   Imaging Results              CTA Chest Non-Coronary (PE Studies) (Final result)  Result time 06/05/25 18:47:16      Final result by Mirta Gayle MD (06/05/25 18:47:16)                   Impression:      No central or segmental pulmonary embolus.    Other findings above      Electronically signed by: Mirta Gayle  Date:    06/05/2025  Time:    18:47               Narrative:    EXAMINATION:  CTA CHEST NON CORONARY (PE STUDIES)    CLINICAL HISTORY:  Pulmonary embolism (PE) suspected, high prob;    TECHNIQUE:  Low dose axial images, sagittal and coronal reformations were obtained from the thoracic inlet to the lung bases following the IV administration of 100 mL of Omnipaque 350.  Contrast timing was optimized to evaluate the pulmonary arteries.  MIP images were performed.    COMPARISON:  05/11/2025    FINDINGS:  Base of Neck: No significant  abnormality.    Thoracic soft tissues: Unremarkable.    Aorta: Moderate coronary artery atherosclerosis.    Heart: Normal size. No effusion.    Pulmonary vasculature: No central or segmental pulmonary embolus.    Sindhu/Mediastinum: No pathologic elizabeth enlargement.    Airways: Patent.    Lungs/Pleura: Clear lungs. No pleural effusion or thickening.  3 mm noncalcified nodule right lower lobe.  Follow-up chest CT in 12 months    Esophagus: Unremarkable.    Bones: Redemonstrated L1 burst fracture.                                        CT Abdomen Pelvis With IV Contrast NO Oral Contrast (Final result)  Result time 06/05/25 19:42:04      Final result by Mirta Gayle MD (06/05/25 19:42:04)                   Impression:      Postsurgical changes of colectomy. Right lower quadrant ostomy. Small multiloculated gas containing fluid collections in the right anterior abdominal wall subcutaneous fat.  34 x 29 mm infected fluid collection in the right hemiabdomen abutting the inferior margin right hepatic lobe which is intimately associated with adjacent bowel loops suspect for fistulous communication.  Mild fluid and/or edema tracks towards and contacts the right psoas muscle which is mildly edematous (see coronal images 43 through 55).  Psoas muscle is presumed infected.  Follow-up with ESR and CRP.  Consider follow-up MRI lumbar spine with and without contrast to exclude potential discitis-osteomyelitis.    Mild infectious/inflammatory enteritis of small bowel loops in the left upper quadrant.  No small bowel obstruction.    Other findings above    This report was flagged in Epic as abnormal.      Electronically signed by: Mirta Gayle  Date:    06/05/2025  Time:    19:42               Narrative:    EXAMINATION:  CT ABDOMEN PELVIS WITH IV CONTRAST    CLINICAL HISTORY:  LLQ abdominal pain;RECENT OSTOMY PLACEMENT/POSTOP COMPLICATION OF PERIOSTEAL ABSCESS/INCREASING PAIN;    TECHNIQUE:  Low dose axial images,  sagittal and coronal reformations were obtained from the lung bases to the pubic symphysis following the IV administration of 100 mL of Omnipaque 350 .  Oral contrast was not given.    COMPARISON:  03/23/2025    FINDINGS:  Bones: Profound osteopenia.  Similar L1 burst fracture.    Lower chest: Normal heart size. No pericardial effusion.    Lung Bases: Well aerated, without consolidation or pleural fluid.    Liver: Normal in size and attenuation, with no focal hepatic lesions.    Gallbladder: Status post cholecystectomy.    Bile Ducts: No evidence of dilated ducts.    Pancreas: No mass or peripancreatic fat stranding.    Spleen: Unremarkable.    Adrenals: Unremarkable.    Kidneys/ Ureters: Nonobstructive bilateral nephrolithiasis.    Bladder: No evidence of wall thickening.    Reproductive organs: Unremarkable.    Soft tissue/GI Tract/Mesentery/peritoneal space: Postsurgical changes of colectomy.  Right lower quadrant ostomy.  Small multiloculated gas containing fluid collections in the right anterior abdominal wall subcutaneous fat.  34 x 29 mm fluid collection in the right hemiabdomen abutting the inferior margin right hepatic lobe which is intimately associated with adjacent bowel loops.  Mild fluid and/or edema tracks towards and contacts the right psoas muscle which is mildly edematous (see coronal images 43 through 55).  Duodenal diverticulum.  Mild infectious/inflammatory enteritis of small bowel loops in the left upper quadrant.  No small bowel obstruction.    Lymphadenopathy: No significant adenopathy.    Vasculature: No significant atherosclerosis or aneurysm.                                       X-Ray Chest AP Portable (Final result)  Result time 06/05/25 13:57:30      Final result by Adrian Lynch MD (06/05/25 13:57:30)                   Impression:      No acute cardiac or pulmonary process.      Electronically signed by: Adrian Lynch  Date:    06/05/2025  Time:    13:57                "Narrative:    CLINICAL HISTORY:  ([id number])51 y/o  (1975) M    GI bleed;    TECHNIQUE:  (A#[id number], exam time 6/5/2025 13:54)    XR CHEST AP PORTABLE [id number]    COMPARISON:  5.11.25    FINDINGS:  The lungs are clear. Costophrenic angles are seen without effusion. No pneumothorax is identified. The heart is normal in size. The mediastinum is within normal limits. Osseous structures show degenerative disc disease and degenerative changes in the shoulders. The visualized upper abdomen is unremarkable.                                       Medications   magnesium sulfate 2g in water 50mL IVPB (premix) (2 g Intravenous New Bag 6/5/25 1918)   piperacillin-tazobactam (ZOSYN) 4.5 g in D5W 100 mL IVPB (MB+) (has no administration in time range)   vancomycin - pharmacy to dose (has no administration in time range)   vancomycin 2,000 mg in 0.9% NaCl 500 mL IVPB (admixture device) (has no administration in time range)   sodium chloride 0.9% bolus 1,000 mL 1,000 mL (0 mLs Intravenous Stopped 6/5/25 1925)   morphine injection 2 mg (2 mg Intravenous Given 6/5/25 1621)   ondansetron injection 4 mg (4 mg Intravenous Given 6/5/25 1620)   iohexoL (OMNIPAQUE 350) injection 100 mL (100 mLs Intravenous Given 6/5/25 1712)     Medical Decision Making  Labs and diagnostic imaging reviewed.  CT scan findings noted.  "Impression:   Postsurgical changes of colectomy. Right lower quadrant ostomy. Small multiloculated gas containing fluid collections in the right anterior abdominal wall subcutaneous fat.  34 x 29 mm infected fluid collection in the right hemiabdomen abutting the inferior margin right hepatic lobe which is intimately associated with adjacent bowel loops suspect for fistulous communication.  Mild fluid and/or edema tracks towards and contacts the right psoas muscle which is mildly edematous (see coronal images 43 through 55).  Psoas muscle is presumed infected.  Follow-up with ESR and CRP.  Consider follow-up MRI lumbar " "spine with and without contrast to exclude potential discitis-osteomyelitis.   Mild infectious/inflammatory enteritis of small bowel loops in the left upper quadrant.  No small bowel obstruction."   Patient's physician at [facility] is Dr. Church-- the patient is not currently demonstrating evidence of sepsis shock.  Blood cultures have been obtained.  Broad-spectrum IV antibiotics initiated.  The patient would like to continue care with his surgeon at [facility].  Will initiate transfer to [facility] for evaluation and continuity of care by consultation through the [facility] transfer center  The patient has been accepted in transfer to [facility]      Amount and/or Complexity of Data Reviewed  Labs: ordered. Decision-making details documented in ED Course.  Radiology: ordered.    Risk  Prescription drug management.               ED Course as of 06/05/25 2113   Thu Jun 05, 2025 2006 Leukocyte Esterase, UA(!): Trace [AR]   2006 BNP: 30 [AR]   2006 WBC, UA(!): 7 [AR]   2006 Troponin I High Sensitivity: 3.7 [AR]   2006 Lactic Acid Level: 0.7 [AR]   2006 PT: 11.4 [AR]   2006 INR: 1.0 [AR]   2006 CPK(!): 18 [AR]   2006 Lipase: 12 [AR]   2006 Magnesium (!): 1.4 [AR]   2006 POCT Glucose: 98 [AR]   2006 POC Lactate: 1.81 [AR]   2006 Sodium: 136 [AR]   2006 Potassium: 3.9 [AR]   2006 Chloride: 104 [AR]   2006 CO2: 25 [AR]   2006 Glucose: 94 [AR]   2006 BUN: 9 [AR]   2006 Creatinine: 1.1 [AR]   2006 Calcium: 9.7 [AR]   2006 PROTEIN TOTAL: 7.8 [AR]   2006 Albumin: 3.6 [AR]   2006 BILIRUBIN TOTAL: 0.3 [AR]   2006 ALP: 72 [AR]   2006 WBC(!): 12.75 [AR]   2006 Hemoglobin(!): 10.0 [AR]   2006 Hematocrit(!): 33.9 [AR]   2006 Platelet Count: 338 [AR]      ED Course User Index  [AR] Maribel Dobson MD                           Clinical Impression:  Final diagnoses:  [T81.42XD] Infection of deep incisional surgical site after procedure, subsequent encounter (Primary)          ED Disposition Condition    " Transfer to Another Facility Stable                    Maribel Dobson MD  06/05/25 2014         [1]   Social History  Tobacco Use    Smoking status: Former     Average packs/day: 1 pack/day for 30.0 years (30.0 ttl pk-yrs)     Types: Cigarettes     Start date: 1993    Smokeless tobacco: Never    Tobacco comments:     Pt states he has stopped smoking with Chantix three weeks ago. 12/1/23   Substance Use Topics    Alcohol use: Yes     Comment: ocass    Drug use: Not Currently        Maribel Dobson MD  06/05/25 3365

## 2025-06-07 LAB
OHS QRS DURATION: 72 MS
OHS QTC CALCULATION: 442 MS

## 2025-06-10 LAB
BACTERIA BLD CULT: NORMAL
BACTERIA BLD CULT: NORMAL

## 2025-07-08 NOTE — PATIENT INSTRUCTIONS
Please see wound care instructions which have been provided separately.      [Yes] : yes [No Known Use] : no known use